# Patient Record
Sex: FEMALE | Race: WHITE | NOT HISPANIC OR LATINO | Employment: OTHER | URBAN - METROPOLITAN AREA
[De-identification: names, ages, dates, MRNs, and addresses within clinical notes are randomized per-mention and may not be internally consistent; named-entity substitution may affect disease eponyms.]

---

## 2017-01-03 ENCOUNTER — GENERIC CONVERSION - ENCOUNTER (OUTPATIENT)
Dept: OTHER | Facility: OTHER | Age: 67
End: 2017-01-03

## 2017-02-10 ENCOUNTER — GENERIC CONVERSION - ENCOUNTER (OUTPATIENT)
Dept: OTHER | Facility: OTHER | Age: 67
End: 2017-02-10

## 2017-02-16 ENCOUNTER — GENERIC CONVERSION - ENCOUNTER (OUTPATIENT)
Dept: OTHER | Facility: OTHER | Age: 67
End: 2017-02-16

## 2017-06-08 DIAGNOSIS — I48.0 PAROXYSMAL ATRIAL FIBRILLATION (HCC): ICD-10-CM

## 2017-06-08 DIAGNOSIS — I51.7 CARDIOMEGALY: ICD-10-CM

## 2017-06-08 DIAGNOSIS — M79.606 PAIN OF LOWER EXTREMITY: ICD-10-CM

## 2017-07-11 ENCOUNTER — APPOINTMENT (OUTPATIENT)
Dept: RADIOLOGY | Facility: CLINIC | Age: 67
End: 2017-07-11
Payer: MEDICARE

## 2017-07-11 ENCOUNTER — ALLSCRIPTS OFFICE VISIT (OUTPATIENT)
Dept: OTHER | Facility: OTHER | Age: 67
End: 2017-07-11

## 2017-07-11 ENCOUNTER — TRANSCRIBE ORDERS (OUTPATIENT)
Dept: RADIOLOGY | Facility: CLINIC | Age: 67
End: 2017-07-11

## 2017-07-11 DIAGNOSIS — R60.0 LOCALIZED EDEMA: ICD-10-CM

## 2017-07-11 DIAGNOSIS — M10.9 GOUT: ICD-10-CM

## 2017-07-11 DIAGNOSIS — Z11.59 ENCOUNTER FOR SCREENING FOR OTHER VIRAL DISEASES: ICD-10-CM

## 2017-07-11 DIAGNOSIS — Z00.00 ENCOUNTER FOR GENERAL ADULT MEDICAL EXAMINATION WITHOUT ABNORMAL FINDINGS: ICD-10-CM

## 2017-07-11 DIAGNOSIS — E78.5 HYPERLIPIDEMIA: ICD-10-CM

## 2017-07-11 DIAGNOSIS — R06.02 SHORTNESS OF BREATH: ICD-10-CM

## 2017-07-11 DIAGNOSIS — E03.9 HYPOTHYROIDISM: ICD-10-CM

## 2017-07-11 DIAGNOSIS — I48.0 PAROXYSMAL ATRIAL FIBRILLATION (HCC): ICD-10-CM

## 2017-07-11 PROCEDURE — 71020 HB CHEST X-RAY 2VW FRONTAL&LATL: CPT

## 2017-07-14 ENCOUNTER — GENERIC CONVERSION - ENCOUNTER (OUTPATIENT)
Dept: OTHER | Facility: OTHER | Age: 67
End: 2017-07-14

## 2017-07-28 ENCOUNTER — TRANSCRIBE ORDERS (OUTPATIENT)
Dept: ADMINISTRATIVE | Facility: HOSPITAL | Age: 67
End: 2017-07-28

## 2017-07-28 DIAGNOSIS — N17.0 ACUTE RENAL FAILURE WITH TUBULAR NECROSIS (HCC): Primary | ICD-10-CM

## 2017-08-15 ENCOUNTER — GENERIC CONVERSION - ENCOUNTER (OUTPATIENT)
Dept: OTHER | Facility: OTHER | Age: 67
End: 2017-08-15

## 2017-08-15 DIAGNOSIS — N20.0 CALCULUS OF KIDNEY: ICD-10-CM

## 2017-08-31 ENCOUNTER — HOSPITAL ENCOUNTER (OUTPATIENT)
Dept: CT IMAGING | Facility: HOSPITAL | Age: 67
Discharge: HOME | End: 2017-08-31
Attending: UROLOGY
Payer: MEDICARE

## 2017-08-31 DIAGNOSIS — N20.0 CALCULUS OF KIDNEY: ICD-10-CM

## 2017-08-31 PROCEDURE — 74176 CT ABD & PELVIS W/O CONTRAST: CPT

## 2017-09-01 ENCOUNTER — GENERIC CONVERSION - ENCOUNTER (OUTPATIENT)
Dept: OTHER | Facility: OTHER | Age: 67
End: 2017-09-01

## 2017-09-20 ENCOUNTER — ALLSCRIPTS OFFICE VISIT (OUTPATIENT)
Dept: OTHER | Facility: OTHER | Age: 67
End: 2017-09-20

## 2017-09-20 DIAGNOSIS — N83.202 CYST OF LEFT OVARY: ICD-10-CM

## 2017-09-20 DIAGNOSIS — R10.2 PELVIC AND PERINEAL PAIN: ICD-10-CM

## 2017-10-06 ENCOUNTER — HOSPITAL ENCOUNTER (OUTPATIENT)
Dept: ULTRASOUND IMAGING | Facility: HOSPITAL | Age: 67
Discharge: HOME | End: 2017-10-06
Attending: OBSTETRICS & GYNECOLOGY
Payer: MEDICARE

## 2017-10-06 DIAGNOSIS — R10.2 PELVIC AND PERINEAL PAIN: ICD-10-CM

## 2017-10-06 DIAGNOSIS — N83.202 CYST OF LEFT OVARY: ICD-10-CM

## 2017-10-06 PROCEDURE — 76856 US EXAM PELVIC COMPLETE: CPT

## 2017-10-06 PROCEDURE — 76830 TRANSVAGINAL US NON-OB: CPT

## 2017-10-13 ENCOUNTER — GENERIC CONVERSION - ENCOUNTER (OUTPATIENT)
Dept: OTHER | Facility: OTHER | Age: 67
End: 2017-10-13

## 2017-10-25 ENCOUNTER — TRANSCRIBE ORDERS (OUTPATIENT)
Dept: ADMINISTRATIVE | Facility: HOSPITAL | Age: 67
End: 2017-10-25

## 2017-10-25 DIAGNOSIS — R10.13 EPIGASTRIC PAIN: Primary | ICD-10-CM

## 2017-10-27 ENCOUNTER — HOSPITAL ENCOUNTER (OUTPATIENT)
Dept: CT IMAGING | Facility: HOSPITAL | Age: 67
Discharge: HOME | End: 2017-10-27
Payer: MEDICARE

## 2017-10-27 DIAGNOSIS — R10.13 EPIGASTRIC PAIN: ICD-10-CM

## 2017-10-27 PROCEDURE — 74176 CT ABD & PELVIS W/O CONTRAST: CPT

## 2017-11-09 ENCOUNTER — GENERIC CONVERSION - ENCOUNTER (OUTPATIENT)
Dept: OTHER | Facility: OTHER | Age: 67
End: 2017-11-09

## 2017-11-17 ENCOUNTER — HOSPITAL ENCOUNTER (OUTPATIENT)
Dept: ULTRASOUND IMAGING | Facility: MEDICAL CENTER | Age: 67
Discharge: HOME | End: 2017-11-17
Payer: MEDICARE

## 2017-11-17 ENCOUNTER — HOSPITAL ENCOUNTER (OUTPATIENT)
Dept: MAMMOGRAPHY | Facility: MEDICAL CENTER | Age: 67
Discharge: HOME | End: 2017-11-17
Payer: MEDICARE

## 2017-11-17 DIAGNOSIS — N83.202 CYST OF LEFT OVARY: ICD-10-CM

## 2017-11-17 DIAGNOSIS — Z12.31 ENCOUNTER FOR SCREENING MAMMOGRAM FOR MALIGNANT NEOPLASM OF BREAST: ICD-10-CM

## 2017-11-17 PROCEDURE — 76830 TRANSVAGINAL US NON-OB: CPT

## 2017-11-17 PROCEDURE — G0202 SCR MAMMO BI INCL CAD: HCPCS

## 2017-11-17 PROCEDURE — 76856 US EXAM PELVIC COMPLETE: CPT

## 2017-12-06 ENCOUNTER — GENERIC CONVERSION - ENCOUNTER (OUTPATIENT)
Dept: OTHER | Facility: OTHER | Age: 67
End: 2017-12-06

## 2018-01-05 ENCOUNTER — GENERIC CONVERSION - ENCOUNTER (OUTPATIENT)
Dept: OTHER | Facility: OTHER | Age: 68
End: 2018-01-05

## 2018-01-10 NOTE — RESULT NOTES
Verified Results  * XR CHEST PA & LATERAL [id number] 10:41AM Adali Borer Order Number: [id number]     Test Name Result Flag Reference   XR CHEST PA & LATERAL (Report)     CHEST 2 View     INDICATION: Edema  History of atrial fibrillation  Former smoker  COMPARISON: None     VIEWS: PA and lateral projections; 3 images     FINDINGS:        Mild cardiomegaly  The lungs are clear  No pneumothorax or pleural effusion  Visualized osseous structures appear within normal limits for the patient's age  IMPRESSION:     Mild cardiomegaly  No active pulmonary disease  Workstation performed: [id number]     Signed by:    Cherylene Maiers, MD   7/14/17

## 2018-01-14 VITALS
DIASTOLIC BLOOD PRESSURE: 78 MMHG | HEIGHT: 64 IN | SYSTOLIC BLOOD PRESSURE: 118 MMHG | RESPIRATION RATE: 18 BRPM | HEART RATE: 66 BPM | TEMPERATURE: 96.1 F

## 2018-01-14 VITALS
SYSTOLIC BLOOD PRESSURE: 140 MMHG | HEIGHT: 64 IN | DIASTOLIC BLOOD PRESSURE: 90 MMHG | WEIGHT: 293 LBS | BODY MASS INDEX: 50.02 KG/M2

## 2018-01-22 VITALS
DIASTOLIC BLOOD PRESSURE: 68 MMHG | HEIGHT: 64 IN | BODY MASS INDEX: 50.02 KG/M2 | WEIGHT: 293 LBS | SYSTOLIC BLOOD PRESSURE: 104 MMHG

## 2018-01-22 VITALS
WEIGHT: 293 LBS | HEIGHT: 64 IN | DIASTOLIC BLOOD PRESSURE: 67 MMHG | SYSTOLIC BLOOD PRESSURE: 132 MMHG | BODY MASS INDEX: 50.02 KG/M2

## 2018-01-23 NOTE — MISCELLANEOUS
Message   Recorded as Task   Date: 12/04/2017 04:29 PM, Created By: Basia Sotomayor   Task Name: Go to Result   Assigned To: Carlie Romeo   Regarding Patient: Maira Hill, Status: Active   CommentLu Goodwin - 04 Dec 2017 4:29 PM     TASK CREATED  I called the patient and reviewed her ultrasound results  I explained to the patient her ovary is not visible by u/s  I had the radiologist review both u/s and her original CT scan and another CT she had subsequently in the meantime  She has a stable simple cyst on the left ovary  the radiologist reports the ovary is too high in the pelvis to see it on vaginal u/s and her pannus obstructs the view abdominally  Pt advised that radiology recommends a repeat CT pelvis with contrast in 1 year to re-evaluate the cyst   Pt agreeable with plan of care  She requests that we send a copy of this task to [address] [place] [address],[address] home and to her house  Rx is in chart    PTN  Thanks!    Emy Bueno - 06 Dec 2017 7:47 AM     TASK REPLIED TO: Previously Assigned To Emy Bueno  Watch file        Signatures   Electronically signed by : YOANNA La ; Dec  6 2017 10:46AM EST                       (Author)

## 2018-01-26 ENCOUNTER — TELEPHONE (OUTPATIENT)
Dept: FAMILY MEDICINE CLINIC | Facility: CLINIC | Age: 68
End: 2018-01-26

## 2018-01-26 DIAGNOSIS — M19.90 OSTEOARTHRITIS, UNSPECIFIED OSTEOARTHRITIS TYPE, UNSPECIFIED SITE: Primary | ICD-10-CM

## 2018-01-26 DIAGNOSIS — Z79.01 CHRONIC ANTICOAGULATION: ICD-10-CM

## 2018-01-26 DIAGNOSIS — F32.A DEPRESSION, UNSPECIFIED DEPRESSION TYPE: ICD-10-CM

## 2018-01-26 RX ORDER — DULOXETIN HYDROCHLORIDE 20 MG/1
1 CAPSULE, DELAYED RELEASE ORAL DAILY
COMMUNITY
End: 2018-01-26 | Stop reason: SDUPTHER

## 2018-01-26 RX ORDER — DULOXETIN HYDROCHLORIDE 20 MG/1
20 CAPSULE, DELAYED RELEASE ORAL DAILY
Qty: 30 CAPSULE | Refills: 5 | Status: SHIPPED | OUTPATIENT
Start: 2018-01-26 | End: 2018-08-15 | Stop reason: SDUPTHER

## 2018-01-26 RX ORDER — DICLOFENAC SODIUM 75 MG/1
1 TABLET, DELAYED RELEASE ORAL 2 TIMES DAILY
COMMUNITY
Start: 2012-06-07 | End: 2018-01-26 | Stop reason: SDUPTHER

## 2018-01-26 RX ORDER — WARFARIN SODIUM 3 MG/1
3 TABLET ORAL DAILY
Qty: 30 TABLET | Refills: 0 | Status: SHIPPED | OUTPATIENT
Start: 2018-01-26 | End: 2018-04-26 | Stop reason: SDUPTHER

## 2018-01-26 RX ORDER — DICLOFENAC SODIUM 75 MG/1
75 TABLET, DELAYED RELEASE ORAL 2 TIMES DAILY
Qty: 60 TABLET | Refills: 3 | Status: SHIPPED | OUTPATIENT
Start: 2018-01-26 | End: 2018-06-05 | Stop reason: SDUPTHER

## 2018-01-26 RX ORDER — WARFARIN SODIUM 3 MG/1
1 TABLET ORAL DAILY
COMMUNITY
End: 2018-01-26 | Stop reason: SDUPTHER

## 2018-01-26 NOTE — TELEPHONE ENCOUNTER
Physical therapy TRACY cano 10/25/2018  She is unable to meet her goals    Has a lot of pain in hip and not able to walk,  Any questions [phone]

## 2018-01-31 ENCOUNTER — ANTICOAG VISIT (OUTPATIENT)
Dept: INTERNAL MEDICINE CLINIC | Facility: CLINIC | Age: 68
End: 2018-01-31

## 2018-01-31 ENCOUNTER — TELEPHONE (OUTPATIENT)
Dept: FAMILY MEDICINE CLINIC | Facility: CLINIC | Age: 68
End: 2018-01-31

## 2018-01-31 LAB — INR PPP: 1.8 (ref 0.86–1.16)

## 2018-01-31 RX ORDER — SENNOSIDES 8.6 MG
650 CAPSULE ORAL EVERY 8 HOURS PRN
COMMUNITY

## 2018-01-31 RX ORDER — ALLOPURINOL 300 MG/1
1 TABLET ORAL DAILY
COMMUNITY
Start: 2017-07-06 | End: 2018-07-18 | Stop reason: SDUPTHER

## 2018-01-31 RX ORDER — DULOXETIN HYDROCHLORIDE 30 MG/1
30 CAPSULE, DELAYED RELEASE ORAL
COMMUNITY
End: 2019-04-18 | Stop reason: SDUPTHER

## 2018-01-31 RX ORDER — ACETAMINOPHEN 325 MG/1
TABLET ORAL
COMMUNITY
End: 2020-10-15 | Stop reason: ALTCHOICE

## 2018-01-31 RX ORDER — OMEPRAZOLE 20 MG/1
1 CAPSULE, DELAYED RELEASE ORAL DAILY
Status: ON HOLD | COMMUNITY
Start: 2017-07-11 | End: 2020-10-03 | Stop reason: ALTCHOICE

## 2018-01-31 RX ORDER — FUROSEMIDE 40 MG/1
TABLET ORAL
COMMUNITY
End: 2018-07-18 | Stop reason: SDUPTHER

## 2018-01-31 RX ORDER — WARFARIN SODIUM 4 MG/1
TABLET ORAL
COMMUNITY
Start: 2017-07-24 | End: 2018-02-22 | Stop reason: SDUPTHER

## 2018-01-31 RX ORDER — DICLOFENAC SODIUM 75 MG/1
75 TABLET, DELAYED RELEASE ORAL
COMMUNITY
End: 2019-05-07

## 2018-01-31 RX ORDER — OXYCODONE HYDROCHLORIDE 5 MG/1
1 CAPSULE ORAL EVERY 6 HOURS PRN
COMMUNITY
Start: 2017-07-11 | End: 2020-10-15 | Stop reason: ALTCHOICE

## 2018-01-31 NOTE — TELEPHONE ENCOUNTER
Pt had PT/INR drawn last week 01/24/18 with home [facility]- never received a call- we never received the report- called the lab and they have the incorrect fax number- updated with them and requested a copy be faxed- verbal obtained also   PT-20 0  INR-1 8  She is currently on 3mg daily 2 5mg on Fridays

## 2018-02-09 ENCOUNTER — ANTICOAG VISIT (OUTPATIENT)
Dept: INTERNAL MEDICINE CLINIC | Facility: CLINIC | Age: 68
End: 2018-02-09

## 2018-02-09 ENCOUNTER — TELEPHONE (OUTPATIENT)
Dept: INTERNAL MEDICINE CLINIC | Facility: CLINIC | Age: 68
End: 2018-02-09

## 2018-02-09 LAB
INR PPP: 1.7 (ref 0.86–1.16)

## 2018-02-19 ENCOUNTER — ANTICOAG VISIT (OUTPATIENT)
Dept: INTERNAL MEDICINE CLINIC | Facility: CLINIC | Age: 68
End: 2018-02-19

## 2018-02-22 DIAGNOSIS — I48.91 ATRIAL FIBRILLATION, UNSPECIFIED TYPE (HCC): Primary | ICD-10-CM

## 2018-02-23 RX ORDER — WARFARIN SODIUM 4 MG/1
TABLET ORAL
Qty: 30 TABLET | Refills: 5 | Status: SHIPPED | OUTPATIENT
Start: 2018-02-23 | End: 2018-04-26 | Stop reason: SDUPTHER

## 2018-03-12 ENCOUNTER — TELEPHONE (OUTPATIENT)
Dept: FAMILY MEDICINE CLINIC | Facility: CLINIC | Age: 68
End: 2018-03-12

## 2018-03-19 LAB — INR PPP: 3.2 (ref 0.86–1.16)

## 2018-03-23 ENCOUNTER — ANTICOAG VISIT (OUTPATIENT)
Dept: INTERNAL MEDICINE CLINIC | Facility: CLINIC | Age: 68
End: 2018-03-23

## 2018-03-23 DIAGNOSIS — I48.91 ATRIAL FIBRILLATION, UNSPECIFIED TYPE (HCC): Primary | ICD-10-CM

## 2018-04-17 ENCOUNTER — TELEPHONE (OUTPATIENT)
Dept: INTERNAL MEDICINE CLINIC | Facility: CLINIC | Age: 68
End: 2018-04-17

## 2018-04-17 ENCOUNTER — ANTICOAG VISIT (OUTPATIENT)
Dept: INTERNAL MEDICINE CLINIC | Facility: CLINIC | Age: 68
End: 2018-04-17

## 2018-04-17 LAB — INR PPP: 2.3 (ref 0.86–1.16)

## 2018-04-17 NOTE — TELEPHONE ENCOUNTER
[facility] lab called on PT/INR results:    INR: 2 3   Pt:24 5    Collection date: 04/16/2018

## 2018-04-23 DIAGNOSIS — Z79.01 CHRONIC ANTICOAGULATION: ICD-10-CM

## 2018-04-23 RX ORDER — WARFARIN SODIUM 3 MG/1
3 TABLET ORAL DAILY
Qty: 30 TABLET | Refills: 5 | OUTPATIENT
Start: 2018-04-23

## 2018-04-24 ENCOUNTER — TELEPHONE (OUTPATIENT)
Dept: FAMILY MEDICINE CLINIC | Facility: CLINIC | Age: 68
End: 2018-04-24

## 2018-04-24 NOTE — TELEPHONE ENCOUNTER
Called pt to let her know she is overdue to a routine appt and she needs her coumadin 3 mg and states she has not come in for an appt due to not having any transportation- advised pt of the [facility] transport but has no money to [facility] for it  -suggested her care taker bring her and she said she is unable to get into her vehicle -

## 2018-04-25 ENCOUNTER — PATIENT OUTREACH (OUTPATIENT)
Dept: INTERNAL MEDICINE CLINIC | Facility: CLINIC | Age: 68
End: 2018-04-25

## 2018-04-26 DIAGNOSIS — Z79.01 CHRONIC ANTICOAGULATION: ICD-10-CM

## 2018-04-26 DIAGNOSIS — I48.91 ATRIAL FIBRILLATION, UNSPECIFIED TYPE (HCC): ICD-10-CM

## 2018-04-26 RX ORDER — WARFARIN SODIUM 3 MG/1
3 TABLET ORAL DAILY
Qty: 30 TABLET | Refills: 0 | Status: SHIPPED | OUTPATIENT
Start: 2018-04-26 | End: 2018-05-07 | Stop reason: SDUPTHER

## 2018-04-26 RX ORDER — WARFARIN SODIUM 4 MG/1
TABLET ORAL
Qty: 30 TABLET | Refills: 0 | Status: ON HOLD | OUTPATIENT
Start: 2018-04-26 | End: 2022-02-22 | Stop reason: SDUPTHER

## 2018-05-07 DIAGNOSIS — Z79.01 CHRONIC ANTICOAGULATION: ICD-10-CM

## 2018-05-07 RX ORDER — WARFARIN SODIUM 3 MG/1
3 TABLET ORAL DAILY
Qty: 30 TABLET | Refills: 0 | Status: SHIPPED | OUTPATIENT
Start: 2018-05-07 | End: 2018-05-30 | Stop reason: SDUPTHER

## 2018-05-07 NOTE — TELEPHONE ENCOUNTER
Spoke with pt and she is waiting for transportation with the bus to go through and will call to schedule appt  However, she will need rx to continue coumadin dose

## 2018-05-10 ENCOUNTER — PATIENT OUTREACH (OUTPATIENT)
Dept: INTERNAL MEDICINE CLINIC | Facility: CLINIC | Age: 68
End: 2018-05-10

## 2018-05-19 LAB
ABG-SITE (HISTORICAL): ABNORMAL
ALBUMIN SERPL BCP-MCNC: 3.7 G/DL (ref 3.5–5.7)
ALLEN TEST (HISTORICAL): POSITIVE
ALP SERPL-CCNC: 99 IU/L (ref 55–165)
ALT SERPL W P-5'-P-CCNC: 13 IU/L (ref 10–30)
ANION GAP SERPL CALCULATED.3IONS-SCNC: 12.5 MM/L
APTT PPP: 43.5 SEC (ref 24.4–37.6)
AST SERPL W P-5'-P-CCNC: 13 U/L (ref 7–26)
BASE EXCESS BLDA CALC-SCNC: -1.8 MM/L (ref -2–3)
BASOPHILS # BLD AUTO: 0 X3/UL (ref 0–0.3)
BASOPHILS # BLD AUTO: 0.4 % (ref 0–2)
BILIRUB SERPL-MCNC: 0.8 MG/DL (ref 0.3–1)
BNP SERPL-MCNC: 232 PG/ML (ref 1–100)
BUN SERPL-MCNC: 33 MG/DL (ref 7–25)
CALCIUM SERPL-MCNC: 9.4 MG/DL (ref 8.6–10.5)
CHLORIDE SERPL-SCNC: 102 MM/L (ref 98–107)
CO HEMOGLOBIN (HISTORICAL): 1 %
CO2 SERPL-SCNC: 28 MM/L (ref 21–31)
CREAT SERPL-MCNC: 1.03 MG/DL (ref 0.6–1.2)
DEOXYHEMOGLOBIN (HISTORICAL): 2 %
DEPRECATED RDW RBC AUTO: 17.2 % (ref 11.5–14.5)
EGFR (HISTORICAL): 54 GFR
EGFR AFRICAN AMERICAN (HISTORICAL): > 60 GFR
EOSINOPHIL # BLD AUTO: 0.2 X3/UL (ref 0–0.5)
EOSINOPHIL NFR BLD AUTO: 2.1 % (ref 0–5)
GLUCOSE (HISTORICAL): 102 MG/DL (ref 65–99)
HCO3 BLDA-SCNC: 22.4 MM/L (ref 22–26)
HCT VFR BLD AUTO: 38.5 % (ref 37–47)
HGB BLD-MCNC: 12.3 G/DL (ref 12–16)
INR PPP: 2.49 (ref 0.9–1.5)
LDL CHOLESTEROL DIRECT (HISTORICAL): 113.9 MG/DL
LYMPHOCYTES # BLD AUTO: 1 X3/UL (ref 1.2–4.2)
LYMPHOCYTES NFR BLD AUTO: 11 % (ref 20.5–51.1)
MCH RBC QN AUTO: 29.8 PG (ref 26–34)
MCHC RBC AUTO-ENTMCNC: 31.9 G/DL (ref 31–36)
MCV RBC AUTO: 93.3 FL (ref 81–99)
METHGB MFR BLDCO: 0.3 %
MONOCYTES # BLD AUTO: 0.5 X3/UL (ref 0–1)
MONOCYTES NFR BLD AUTO: 5.4 % (ref 1.7–12)
NEUTROPHILS # BLD AUTO: 7.4 X3/UL (ref 1.4–6.5)
NEUTS SEG NFR BLD AUTO: 81.1 % (ref 42.2–75.2)
NON VENT ROOM AIR (HISTORICAL): YES
O2 SAT (HISTORICAL): 97.9 %
OSMOLALITY, SERUM (HISTORICAL): 283 MOSM (ref 262–291)
OXY HEMOGLOBIN (HISTORICAL): 96.7 % (ref 94–100)
PCO2 BLDA: 38.3 MM HG (ref 35–45)
PH BLDA: 7.39 [PH] (ref 7.36–7.41)
PLATELET # BLD AUTO: 397 X3/UL (ref 130–400)
PMV BLD AUTO: 6.8 FL (ref 8.6–11.7)
PO2 BLDA: 91 MM HG
POTASSIUM SERPL-SCNC: 4.5 MM/L (ref 3.5–5.5)
PROTHROMBIN TIME (HISTORICAL): 29.4 SEC (ref 10.1–12.9)
RBC # BLD AUTO: 4.12 X6/UL (ref 3.9–5.2)
SODIUM SERPL-SCNC: 138 MM/L (ref 134–143)
TOTAL CO2 (HISTORICAL): 20.5 MM/L (ref 24–34)
TOTAL HGB (HISTORICAL): 11.2 G/DL (ref 12–16)
TOTAL PROTEIN (HISTORICAL): 7.5 G/DL (ref 6.4–8.9)
TROPONIN I SERPL-MCNC: < 0.03 NG/ML
TROPONIN I SERPL-MCNC: < 0.03 NG/ML
WBC # BLD AUTO: 9.1 X3/UL (ref 4.8–10.8)

## 2018-05-25 LAB
INR PPP: 2 (ref 0.86–1.17)
INR PPP: 2 (ref 0.86–1.17)

## 2018-05-29 ENCOUNTER — ANTICOAG VISIT (OUTPATIENT)
Dept: INTERNAL MEDICINE CLINIC | Facility: CLINIC | Age: 68
End: 2018-05-29

## 2018-05-30 DIAGNOSIS — Z79.01 CHRONIC ANTICOAGULATION: ICD-10-CM

## 2018-05-30 RX ORDER — WARFARIN SODIUM 3 MG/1
3 TABLET ORAL DAILY
Qty: 30 TABLET | Refills: 2 | Status: SHIPPED | OUTPATIENT
Start: 2018-05-30 | End: 2018-08-27 | Stop reason: SDUPTHER

## 2018-05-31 ENCOUNTER — PATIENT OUTREACH (OUTPATIENT)
Dept: INTERNAL MEDICINE CLINIC | Facility: CLINIC | Age: 68
End: 2018-05-31

## 2018-05-31 NOTE — PROGRESS NOTES
Outpatient Care Management Note:  Reached out to patient as a follow up  She was seen in [facility] with [facility] on 5/21/18  Per patient, they did nothing for her  She had to stay overnight due to transportation issues  Still has not made an appointment with her PCP, still waiting for her transportation vouchers  Encouraged to follow up with Carmen Chatman since it has been over a month since she requested them  Patient did not follow up with cardiology as previously discussed because "they would want to see me"  Her aides can provide transportation but per the patient, "one car is too high and one car is too low "  Waiver services are going to provide a handicapped accessible bathroom and stair glide system beginning next week per the patient  Reinforced the importance of making a PCP as well as Cardiology appointment ASAP

## 2018-06-05 DIAGNOSIS — M19.90 OSTEOARTHRITIS, UNSPECIFIED OSTEOARTHRITIS TYPE, UNSPECIFIED SITE: ICD-10-CM

## 2018-06-05 RX ORDER — DICLOFENAC SODIUM 75 MG/1
75 TABLET, DELAYED RELEASE ORAL 2 TIMES DAILY
Qty: 60 TABLET | Refills: 5 | Status: SHIPPED | OUTPATIENT
Start: 2018-06-05 | End: 2019-05-07 | Stop reason: SDUPTHER

## 2018-06-25 LAB — INR PPP: 2.3 (ref 0.86–1.17)

## 2018-06-27 ENCOUNTER — ANTICOAG VISIT (OUTPATIENT)
Dept: INTERNAL MEDICINE CLINIC | Facility: CLINIC | Age: 68
End: 2018-06-27

## 2018-06-28 ENCOUNTER — PATIENT OUTREACH (OUTPATIENT)
Dept: INTERNAL MEDICINE CLINIC | Facility: CLINIC | Age: 68
End: 2018-06-28

## 2018-06-28 NOTE — PROGRESS NOTES
Outpatient Care Management Notes:  follow up call completed  Received transportation vouchers but had to return her wheelchair to [facility] because the wheels were set back to far for her to use because of her rotator cuff issues  Is calling to order a wheelchair from [place] today because that is where [facility] gets theirs  No other issues  Not scheduling any appointments until she gets a wheelchair she can use  Encouraged her to reach out if she has continued issues

## 2018-06-29 ENCOUNTER — ANTICOAG VISIT (OUTPATIENT)
Dept: INTERNAL MEDICINE CLINIC | Facility: CLINIC | Age: 68
End: 2018-06-29

## 2018-07-18 DIAGNOSIS — M1A.9XX0 CHRONIC GOUT WITHOUT TOPHUS, UNSPECIFIED CAUSE, UNSPECIFIED SITE: Primary | ICD-10-CM

## 2018-07-18 DIAGNOSIS — I10 ESSENTIAL HYPERTENSION: Primary | ICD-10-CM

## 2018-07-18 RX ORDER — FUROSEMIDE 40 MG/1
40 TABLET ORAL DAILY
Qty: 30 TABLET | Refills: 5 | Status: SHIPPED | OUTPATIENT
Start: 2018-07-18 | End: 2019-07-30 | Stop reason: SDUPTHER

## 2018-07-18 RX ORDER — ALLOPURINOL 300 MG/1
TABLET ORAL
Qty: 30 TABLET | Refills: 0 | Status: SHIPPED | OUTPATIENT
Start: 2018-07-18 | End: 2018-08-15 | Stop reason: SDUPTHER

## 2018-07-20 ENCOUNTER — PATIENT OUTREACH (OUTPATIENT)
Dept: FAMILY MEDICINE CLINIC | Facility: CLINIC | Age: 68
End: 2018-07-20

## 2018-07-20 NOTE — PROGRESS NOTES
Outpatient Care Management Note:  follow up call completed  Received transportation vouchers but had to return her wheelchair to [facility] because the wheels were set back to far for her to use because of her rotator cuff issues  Has still not obtained the correct  wheelchair, it was on back order several places-ordered one from [address] [place] [address] which was supposed to arrive on 7/16/18 but has not  She has not looked into why it was delayed and per patient the company did not supply a tracking number  Offered to call the company but patient prefers to handle it herself  No other needs at this time

## 2018-07-31 ENCOUNTER — ANTICOAG VISIT (OUTPATIENT)
Dept: INTERNAL MEDICINE CLINIC | Facility: CLINIC | Age: 68
End: 2018-07-31

## 2018-07-31 LAB — INR PPP: 2.1 (ref 0.86–1.17)

## 2018-08-15 DIAGNOSIS — F32.A DEPRESSION, UNSPECIFIED DEPRESSION TYPE: ICD-10-CM

## 2018-08-15 DIAGNOSIS — M1A.9XX0 CHRONIC GOUT WITHOUT TOPHUS, UNSPECIFIED CAUSE, UNSPECIFIED SITE: ICD-10-CM

## 2018-08-15 RX ORDER — ALLOPURINOL 300 MG/1
300 TABLET ORAL DAILY
Qty: 30 TABLET | Refills: 5 | Status: SHIPPED | OUTPATIENT
Start: 2018-08-15 | End: 2019-04-09 | Stop reason: SDUPTHER

## 2018-08-15 RX ORDER — DULOXETIN HYDROCHLORIDE 20 MG/1
20 CAPSULE, DELAYED RELEASE ORAL DAILY
Qty: 30 CAPSULE | Refills: 5 | Status: SHIPPED | OUTPATIENT
Start: 2018-08-15

## 2018-08-27 DIAGNOSIS — Z79.01 CHRONIC ANTICOAGULATION: ICD-10-CM

## 2018-08-27 RX ORDER — WARFARIN SODIUM 3 MG/1
3 TABLET ORAL DAILY
Qty: 30 TABLET | Refills: 5 | Status: ON HOLD | OUTPATIENT
Start: 2018-08-27 | End: 2020-10-03 | Stop reason: SDUPTHER

## 2018-09-11 ENCOUNTER — ANTICOAG VISIT (OUTPATIENT)
Dept: INTERNAL MEDICINE CLINIC | Facility: CLINIC | Age: 68
End: 2018-09-11

## 2018-09-11 LAB — INR PPP: 2.3 (ref 0.86–1.17)

## 2018-11-15 DIAGNOSIS — N83.202 CYST OF LEFT OVARY: ICD-10-CM

## 2018-12-12 ENCOUNTER — TELEPHONE (OUTPATIENT)
Dept: UROLOGY | Facility: AMBULATORY SURGERY CENTER | Age: 68
End: 2018-12-12

## 2018-12-12 NOTE — TELEPHONE ENCOUNTER
Cassondra Severin from [facility] called concerning Judiths GFR  Elvira Meyer currently takes Allopurinol 300 mg X 1 dly  However yesterday she had labs done and her GFR is 34  So the MD would like to decrease the Allopurinol to 100mg x dly  Pt will not agree to this unless Dr Lázaro Bush states that it is ok with him   Please Advise [phone]

## 2018-12-13 NOTE — TELEPHONE ENCOUNTER
[address] [place] [address] to decrease Allopurinol to 100mg daily per Dr Zoë Schwartz

## 2018-12-27 ENCOUNTER — ANTICOAG VISIT (OUTPATIENT)
Dept: INTERNAL MEDICINE CLINIC | Facility: CLINIC | Age: 68
End: 2018-12-27

## 2019-02-18 ENCOUNTER — TELEPHONE (OUTPATIENT)
Dept: INTERNAL MEDICINE CLINIC | Facility: CLINIC | Age: 69
End: 2019-02-18

## 2019-02-18 ENCOUNTER — ANTICOAG VISIT (OUTPATIENT)
Dept: INTERNAL MEDICINE CLINIC | Facility: CLINIC | Age: 69
End: 2019-02-18

## 2019-02-18 NOTE — TELEPHONE ENCOUNTER
Pt is currently at [facility], they have their own provider Dr Norberto Vegas who is monitoring her PT INR till she is discharged  There is no discharge date, the facility will fax over all documents to our office when she is discharged

## 2019-03-25 ENCOUNTER — TRANSITIONAL CARE MANAGEMENT (OUTPATIENT)
Dept: INTERNAL MEDICINE CLINIC | Facility: CLINIC | Age: 69
End: 2019-03-25

## 2019-03-25 ENCOUNTER — TELEPHONE (OUTPATIENT)
Dept: INTERNAL MEDICINE CLINIC | Facility: CLINIC | Age: 69
End: 2019-03-25

## 2019-03-26 ENCOUNTER — TELEPHONE (OUTPATIENT)
Dept: INTERNAL MEDICINE CLINIC | Facility: CLINIC | Age: 69
End: 2019-03-26

## 2019-03-26 NOTE — TELEPHONE ENCOUNTER
Pt called, was discharged yesterday from [facility], broke hip last September, pt can't come in for appt yet, can't get around , states she is due for her INR next week, will need [facility] to do home draws

## 2019-03-27 ENCOUNTER — ANTICOAG VISIT (OUTPATIENT)
Dept: INTERNAL MEDICINE CLINIC | Facility: CLINIC | Age: 69
End: 2019-03-27

## 2019-03-27 DIAGNOSIS — I48.91 ATRIAL FIBRILLATION, UNSPECIFIED TYPE (HCC): Primary | ICD-10-CM

## 2019-04-09 DIAGNOSIS — M1A.9XX0 CHRONIC GOUT WITHOUT TOPHUS, UNSPECIFIED CAUSE, UNSPECIFIED SITE: ICD-10-CM

## 2019-04-09 DIAGNOSIS — E03.9 ACQUIRED HYPOTHYROIDISM: ICD-10-CM

## 2019-04-09 DIAGNOSIS — Z79.01 CHRONIC ANTICOAGULATION: Primary | ICD-10-CM

## 2019-04-09 RX ORDER — LEVOTHYROXINE SODIUM 88 UG/1
88 TABLET ORAL DAILY
Qty: 30 TABLET | Refills: 5 | Status: SHIPPED | OUTPATIENT
Start: 2019-04-09 | End: 2019-09-30 | Stop reason: SDUPTHER

## 2019-04-09 RX ORDER — WARFARIN SODIUM 2.5 MG/1
TABLET ORAL
COMMUNITY
End: 2019-04-09 | Stop reason: SDUPTHER

## 2019-04-09 RX ORDER — LEVOTHYROXINE SODIUM 88 UG/1
88 TABLET ORAL DAILY
COMMUNITY
End: 2019-04-09 | Stop reason: SDUPTHER

## 2019-04-09 RX ORDER — ALLOPURINOL 300 MG/1
300 TABLET ORAL DAILY
Qty: 30 TABLET | Refills: 5 | Status: SHIPPED | OUTPATIENT
Start: 2019-04-09 | End: 2019-04-11 | Stop reason: SDUPTHER

## 2019-04-09 RX ORDER — WARFARIN SODIUM 2.5 MG/1
2.5 TABLET ORAL
Qty: 30 TABLET | Refills: 5 | Status: SHIPPED | OUTPATIENT
Start: 2019-04-09 | End: 2020-01-02 | Stop reason: SDUPTHER

## 2019-04-11 DIAGNOSIS — M1A.9XX0 CHRONIC GOUT WITHOUT TOPHUS, UNSPECIFIED CAUSE, UNSPECIFIED SITE: ICD-10-CM

## 2019-04-11 RX ORDER — ALLOPURINOL 100 MG/1
100 TABLET ORAL DAILY
Qty: 30 TABLET | Refills: 5 | Status: SHIPPED | OUTPATIENT
Start: 2019-04-11 | End: 2019-09-30 | Stop reason: SDUPTHER

## 2019-04-16 ENCOUNTER — TELEPHONE (OUTPATIENT)
Dept: INTERNAL MEDICINE CLINIC | Facility: CLINIC | Age: 69
End: 2019-04-16

## 2019-04-18 DIAGNOSIS — F32.A DEPRESSION, UNSPECIFIED DEPRESSION TYPE: ICD-10-CM

## 2019-04-18 RX ORDER — DULOXETIN HYDROCHLORIDE 30 MG/1
30 CAPSULE, DELAYED RELEASE ORAL DAILY
Qty: 30 CAPSULE | Refills: 5 | Status: SHIPPED | OUTPATIENT
Start: 2019-04-18 | End: 2019-10-07 | Stop reason: SDUPTHER

## 2019-05-07 DIAGNOSIS — M19.90 OSTEOARTHRITIS, UNSPECIFIED OSTEOARTHRITIS TYPE, UNSPECIFIED SITE: ICD-10-CM

## 2019-05-07 RX ORDER — DICLOFENAC SODIUM 75 MG/1
75 TABLET, DELAYED RELEASE ORAL 2 TIMES DAILY
Qty: 60 TABLET | Refills: 5 | Status: SHIPPED | OUTPATIENT
Start: 2019-05-07 | End: 2019-11-04 | Stop reason: SDUPTHER

## 2019-05-30 ENCOUNTER — TELEPHONE (OUTPATIENT)
Dept: INTERNAL MEDICINE CLINIC | Facility: CLINIC | Age: 69
End: 2019-05-30

## 2019-05-31 DIAGNOSIS — M16.11 OSTEOARTHRITIS OF RIGHT HIP, UNSPECIFIED OSTEOARTHRITIS TYPE: ICD-10-CM

## 2019-05-31 DIAGNOSIS — M17.12 OSTEOARTHRITIS OF LEFT KNEE, UNSPECIFIED OSTEOARTHRITIS TYPE: ICD-10-CM

## 2019-05-31 DIAGNOSIS — M19.90 OSTEOARTHRITIS, UNSPECIFIED OSTEOARTHRITIS TYPE, UNSPECIFIED SITE: Primary | ICD-10-CM

## 2019-05-31 DIAGNOSIS — I89.0 LYMPHEDEMA: ICD-10-CM

## 2019-05-31 DIAGNOSIS — M19.011 OSTEOARTHRITIS OF RIGHT SHOULDER, UNSPECIFIED OSTEOARTHRITIS TYPE: ICD-10-CM

## 2019-05-31 DIAGNOSIS — I80.02: ICD-10-CM

## 2019-05-31 DIAGNOSIS — R26.2 AMBULATORY DYSFUNCTION: ICD-10-CM

## 2019-05-31 DIAGNOSIS — I48.91 ATRIAL FIBRILLATION, UNSPECIFIED TYPE (HCC): Primary | ICD-10-CM

## 2019-06-04 ENCOUNTER — TELEPHONE (OUTPATIENT)
Dept: INTERNAL MEDICINE CLINIC | Facility: CLINIC | Age: 69
End: 2019-06-04

## 2019-06-04 LAB — INR PPP: 1.4 (ref 0.86–1.17)

## 2019-06-05 ENCOUNTER — ANTICOAG VISIT (OUTPATIENT)
Dept: INTERNAL MEDICINE CLINIC | Facility: CLINIC | Age: 69
End: 2019-06-05

## 2019-06-18 DIAGNOSIS — I48.0 PAF (PAROXYSMAL ATRIAL FIBRILLATION) (HCC): Primary | ICD-10-CM

## 2019-06-19 LAB — INR PPP: 1.4 (ref 0.86–1.17)

## 2019-06-20 ENCOUNTER — TELEPHONE (OUTPATIENT)
Dept: INTERNAL MEDICINE CLINIC | Facility: CLINIC | Age: 69
End: 2019-06-20

## 2019-06-20 ENCOUNTER — ANTICOAG VISIT (OUTPATIENT)
Dept: FAMILY MEDICINE CLINIC | Facility: CLINIC | Age: 69
End: 2019-06-20

## 2019-06-20 DIAGNOSIS — Z79.01 CHRONIC ANTICOAGULATION: Primary | ICD-10-CM

## 2019-07-18 ENCOUNTER — TELEPHONE (OUTPATIENT)
Dept: INTERNAL MEDICINE CLINIC | Facility: CLINIC | Age: 69
End: 2019-07-18

## 2019-07-18 ENCOUNTER — ANTICOAG VISIT (OUTPATIENT)
Dept: INTERNAL MEDICINE CLINIC | Facility: CLINIC | Age: 69
End: 2019-07-18

## 2019-07-18 LAB — INR PPP: 2.4 (ref 0.84–1.19)

## 2019-07-19 ENCOUNTER — TELEPHONE (OUTPATIENT)
Dept: INTERNAL MEDICINE CLINIC | Facility: CLINIC | Age: 69
End: 2019-07-19

## 2019-07-19 NOTE — TELEPHONE ENCOUNTER
Patient is bed bound and tin reid comes into her house to treat her  I will remove dr Marcia Vasquez as her PCP  Pt is aware of this

## 2019-07-30 DIAGNOSIS — I10 ESSENTIAL HYPERTENSION: ICD-10-CM

## 2019-07-30 RX ORDER — FUROSEMIDE 40 MG/1
TABLET ORAL
Qty: 30 TABLET | Refills: 0 | Status: SHIPPED | OUTPATIENT
Start: 2019-07-30 | End: 2019-08-23 | Stop reason: SDUPTHER

## 2019-08-15 LAB — INR PPP: 2.3 (ref 0.84–1.19)

## 2019-08-16 ENCOUNTER — ANTICOAG VISIT (OUTPATIENT)
Dept: INTERNAL MEDICINE CLINIC | Facility: CLINIC | Age: 69
End: 2019-08-16

## 2019-08-23 DIAGNOSIS — I10 ESSENTIAL HYPERTENSION: ICD-10-CM

## 2019-08-23 RX ORDER — FUROSEMIDE 40 MG/1
40 TABLET ORAL DAILY
Qty: 30 TABLET | Refills: 5 | Status: SHIPPED | OUTPATIENT
Start: 2019-08-23 | End: 2022-06-14

## 2019-09-12 ENCOUNTER — TELEPHONE (OUTPATIENT)
Dept: INTERNAL MEDICINE CLINIC | Facility: CLINIC | Age: 69
End: 2019-09-12

## 2019-09-13 LAB — INR PPP: 2.8 (ref 0.84–1.19)

## 2019-09-16 ENCOUNTER — ANTICOAG VISIT (OUTPATIENT)
Dept: INTERNAL MEDICINE CLINIC | Facility: CLINIC | Age: 69
End: 2019-09-16

## 2019-09-30 DIAGNOSIS — E03.9 ACQUIRED HYPOTHYROIDISM: ICD-10-CM

## 2019-09-30 DIAGNOSIS — M1A.9XX0 CHRONIC GOUT WITHOUT TOPHUS, UNSPECIFIED CAUSE, UNSPECIFIED SITE: ICD-10-CM

## 2019-09-30 RX ORDER — LEVOTHYROXINE SODIUM 88 UG/1
88 TABLET ORAL DAILY
Qty: 30 TABLET | Refills: 5 | Status: ON HOLD | OUTPATIENT
Start: 2019-09-30 | End: 2021-11-18 | Stop reason: CLARIF

## 2019-09-30 RX ORDER — ALLOPURINOL 100 MG/1
100 TABLET ORAL DAILY
Qty: 30 TABLET | Refills: 5 | Status: SHIPPED | OUTPATIENT
Start: 2019-09-30

## 2019-09-30 RX ORDER — ALLOPURINOL 100 MG/1
TABLET ORAL
Qty: 30 TABLET | Refills: 0 | Status: ON HOLD | OUTPATIENT
Start: 2019-09-30 | End: 2020-10-03 | Stop reason: SDUPTHER

## 2019-09-30 RX ORDER — LEVOTHYROXINE SODIUM 88 UG/1
TABLET ORAL
Qty: 30 TABLET | Refills: 0 | Status: ON HOLD | OUTPATIENT
Start: 2019-09-30 | End: 2020-10-03 | Stop reason: SDUPTHER

## 2019-10-07 DIAGNOSIS — F32.A DEPRESSION, UNSPECIFIED DEPRESSION TYPE: ICD-10-CM

## 2019-10-07 RX ORDER — DULOXETIN HYDROCHLORIDE 30 MG/1
CAPSULE, DELAYED RELEASE ORAL
Qty: 30 CAPSULE | Refills: 0 | Status: SHIPPED | OUTPATIENT
Start: 2019-10-07 | End: 2019-11-04 | Stop reason: SDUPTHER

## 2019-10-10 ENCOUNTER — TELEPHONE (OUTPATIENT)
Dept: INTERNAL MEDICINE CLINIC | Facility: CLINIC | Age: 69
End: 2019-10-10

## 2019-10-11 ENCOUNTER — ANTICOAG VISIT (OUTPATIENT)
Dept: INTERNAL MEDICINE CLINIC | Facility: CLINIC | Age: 69
End: 2019-10-11

## 2019-10-11 LAB — INR PPP: 2.6 (ref 0.84–1.19)

## 2019-11-04 DIAGNOSIS — M19.90 OSTEOARTHRITIS, UNSPECIFIED OSTEOARTHRITIS TYPE, UNSPECIFIED SITE: ICD-10-CM

## 2019-11-04 DIAGNOSIS — F32.A DEPRESSION, UNSPECIFIED DEPRESSION TYPE: ICD-10-CM

## 2019-11-04 RX ORDER — DICLOFENAC SODIUM 75 MG/1
75 TABLET, DELAYED RELEASE ORAL 2 TIMES DAILY
Qty: 60 TABLET | Refills: 5 | Status: ON HOLD | OUTPATIENT
Start: 2019-11-04 | End: 2020-10-03 | Stop reason: ALTCHOICE

## 2019-11-04 RX ORDER — DULOXETIN HYDROCHLORIDE 30 MG/1
CAPSULE, DELAYED RELEASE ORAL
Qty: 30 CAPSULE | Refills: 3 | Status: ON HOLD | OUTPATIENT
Start: 2019-11-04 | End: 2020-10-03 | Stop reason: ALTCHOICE

## 2019-11-08 ENCOUNTER — ANTICOAG VISIT (OUTPATIENT)
Dept: INTERNAL MEDICINE CLINIC | Facility: CLINIC | Age: 69
End: 2019-11-08

## 2020-01-02 DIAGNOSIS — Z79.01 CHRONIC ANTICOAGULATION: ICD-10-CM

## 2020-01-02 RX ORDER — WARFARIN SODIUM 2.5 MG/1
2.5 TABLET ORAL
Qty: 30 TABLET | Refills: 5 | Status: ON HOLD | OUTPATIENT
Start: 2020-01-02 | End: 2022-02-22 | Stop reason: SDUPTHER

## 2020-01-06 ENCOUNTER — TELEPHONE (OUTPATIENT)
Dept: INTERNAL MEDICINE CLINIC | Facility: CLINIC | Age: 70
End: 2020-01-06

## 2020-01-06 NOTE — TELEPHONE ENCOUNTER
Pt has not been seen in two years, she is showing up on your pursuit list  She has a nurse practitioner that comes to her home and treats her for all  basic medical needs, She has you listed as her PCP because you control INR, She has no transportation that can her to her appointments  She is completely home bound  What would you like me to do

## 2020-01-07 ENCOUNTER — ANTICOAG VISIT (OUTPATIENT)
Dept: INTERNAL MEDICINE CLINIC | Facility: CLINIC | Age: 70
End: 2020-01-07

## 2020-01-10 NOTE — TELEPHONE ENCOUNTER
Left detailed message on patients answering machine  Instructed her that if she wants to continue with Dr Hallie Wylie she had to make an appointment  She has a NP taking care of her and instructed her that she can take care of her medications and her care    DW

## 2020-09-17 ENCOUNTER — TELEPHONE (OUTPATIENT)
Dept: UROLOGY | Facility: MEDICAL CENTER | Age: 70
End: 2020-09-17

## 2020-09-17 NOTE — TELEPHONE ENCOUNTER
Luz Smith called back and patient is bed bound and cannot come into the office  They are asking if the doctor can do a virtual appointment ex face time  Please call Luz Smith back at [phone]

## 2020-09-17 NOTE — TELEPHONE ENCOUNTER
[facility] back and left msg on voicemail that if pt is in pain she should go to ER  She hasn't been seen here in over 3 years and would be considered a new pt  Dr Dominick Levine first available appt is in middle of November  Left number to call back to discuss further

## 2020-09-17 NOTE — TELEPHONE ENCOUNTER
LMOM for Юлия Patterson  Pt has not been seen in over 3 yrs  We are trying to obtain any records in [facility]  Pt would need to make an appt for evaluation as a new pt  Please call back to schedule appt

## 2020-09-17 NOTE — TELEPHONE ENCOUNTER
Pt managed by Chanda Da Silva [facility] in regards to pt having rt flank pain states she has history of stones pt is bed bound

## 2020-09-28 ENCOUNTER — HOSPITAL ENCOUNTER (EMERGENCY)
Facility: HOSPITAL | Age: 70
Discharge: HOME | End: 2020-09-28
Attending: EMERGENCY MEDICINE | Admitting: EMERGENCY MEDICINE
Payer: MEDICARE

## 2020-09-28 VITALS
HEART RATE: 73 BPM | SYSTOLIC BLOOD PRESSURE: 124 MMHG | WEIGHT: 293 LBS | OXYGEN SATURATION: 96 % | TEMPERATURE: 98.4 F | BODY MASS INDEX: 72.44 KG/M2 | DIASTOLIC BLOOD PRESSURE: 73 MMHG | RESPIRATION RATE: 20 BRPM

## 2020-09-28 DIAGNOSIS — L03.311 CELLULITIS OF RIGHT ABDOMINAL WALL: Primary | ICD-10-CM

## 2020-09-28 DIAGNOSIS — E66.01 SUPER-SUPER OBESE (HCC): ICD-10-CM

## 2020-09-28 LAB
ALBUMIN SERPL BCP-MCNC: 2.6 G/DL (ref 3.5–5)
ALP SERPL-CCNC: 125 U/L (ref 46–116)
ALT SERPL W P-5'-P-CCNC: 17 U/L (ref 12–78)
ANION GAP SERPL CALCULATED.3IONS-SCNC: 3 MMOL/L (ref 4–13)
AST SERPL W P-5'-P-CCNC: 24 U/L (ref 5–45)
BASOPHILS # BLD AUTO: 0.05 THOUSANDS/ΜL (ref 0–0.1)
BASOPHILS NFR BLD AUTO: 1 % (ref 0–1)
BILIRUB SERPL-MCNC: 0.97 MG/DL (ref 0.2–1)
BUN SERPL-MCNC: 23 MG/DL (ref 5–25)
CALCIUM ALBUM COR SERPL-MCNC: 9.8 MG/DL (ref 8.3–10.1)
CALCIUM SERPL-MCNC: 8.7 MG/DL (ref 8.3–10.1)
CHLORIDE SERPL-SCNC: 105 MMOL/L (ref 100–108)
CO2 SERPL-SCNC: 29 MMOL/L (ref 21–32)
CREAT SERPL-MCNC: 1.11 MG/DL (ref 0.6–1.3)
EOSINOPHIL # BLD AUTO: 0.25 THOUSAND/ΜL (ref 0–0.61)
EOSINOPHIL NFR BLD AUTO: 3 % (ref 0–6)
ERYTHROCYTE [DISTWIDTH] IN BLOOD BY AUTOMATED COUNT: 17 % (ref 11.6–15.1)
GFR SERPL CREATININE-BSD FRML MDRD: 51 ML/MIN/1.73SQ M
GLUCOSE SERPL-MCNC: 99 MG/DL (ref 65–140)
HCT VFR BLD AUTO: 40.9 % (ref 34.8–46.1)
HGB BLD-MCNC: 12.4 G/DL (ref 11.5–15.4)
IMM GRANULOCYTES # BLD AUTO: 0.03 THOUSAND/UL (ref 0–0.2)
IMM GRANULOCYTES NFR BLD AUTO: 0 % (ref 0–2)
LYMPHOCYTES # BLD AUTO: 1.31 THOUSANDS/ΜL (ref 0.6–4.47)
LYMPHOCYTES NFR BLD AUTO: 14 % (ref 14–44)
MCH RBC QN AUTO: 25.8 PG (ref 26.8–34.3)
MCHC RBC AUTO-ENTMCNC: 30.3 G/DL (ref 31.4–37.4)
MCV RBC AUTO: 85 FL (ref 82–98)
MONOCYTES # BLD AUTO: 0.47 THOUSAND/ΜL (ref 0.17–1.22)
MONOCYTES NFR BLD AUTO: 5 % (ref 4–12)
NEUTROPHILS # BLD AUTO: 6.99 THOUSANDS/ΜL (ref 1.85–7.62)
NEUTS SEG NFR BLD AUTO: 77 % (ref 43–75)
NRBC BLD AUTO-RTO: 0 /100 WBCS
PLATELET # BLD AUTO: 396 THOUSANDS/UL (ref 149–390)
PMV BLD AUTO: 9.1 FL (ref 8.9–12.7)
POTASSIUM SERPL-SCNC: 4.1 MMOL/L (ref 3.5–5.3)
PROT SERPL-MCNC: 8 G/DL (ref 6.4–8.2)
RBC # BLD AUTO: 4.8 MILLION/UL (ref 3.81–5.12)
SODIUM SERPL-SCNC: 137 MMOL/L (ref 136–145)
WBC # BLD AUTO: 9.1 THOUSAND/UL (ref 4.31–10.16)

## 2020-09-28 PROCEDURE — 99283 EMERGENCY DEPT VISIT LOW MDM: CPT

## 2020-09-28 PROCEDURE — 80053 COMPREHEN METABOLIC PANEL: CPT | Performed by: EMERGENCY MEDICINE

## 2020-09-28 PROCEDURE — 85025 COMPLETE CBC W/AUTO DIFF WBC: CPT | Performed by: EMERGENCY MEDICINE

## 2020-09-28 PROCEDURE — 36415 COLL VENOUS BLD VENIPUNCTURE: CPT | Performed by: EMERGENCY MEDICINE

## 2020-09-28 PROCEDURE — 99284 EMERGENCY DEPT VISIT MOD MDM: CPT | Performed by: EMERGENCY MEDICINE

## 2020-09-28 RX ORDER — CEPHALEXIN 500 MG/1
1000 CAPSULE ORAL EVERY 6 HOURS SCHEDULED
Qty: 56 CAPSULE | Refills: 0 | Status: SHIPPED | OUTPATIENT
Start: 2020-09-28 | End: 2020-10-06 | Stop reason: HOSPADM

## 2020-09-28 RX ORDER — CEPHALEXIN 250 MG/1
1000 CAPSULE ORAL ONCE
Status: COMPLETED | OUTPATIENT
Start: 2020-09-28 | End: 2020-09-28

## 2020-09-28 RX ADMIN — CEPHALEXIN 1000 MG: 250 CAPSULE ORAL at 14:20

## 2020-10-03 ENCOUNTER — HOSPITAL ENCOUNTER (INPATIENT)
Facility: HOSPITAL | Age: 70
LOS: 2 days | Discharge: HOME HEALTH | DRG: 603 | End: 2020-10-06
Attending: EMERGENCY MEDICINE | Admitting: HOSPITALIST
Payer: MEDICARE

## 2020-10-03 ENCOUNTER — APPOINTMENT (EMERGENCY)
Dept: CT IMAGING | Facility: HOSPITAL | Age: 70
DRG: 603 | End: 2020-10-03
Payer: MEDICARE

## 2020-10-03 DIAGNOSIS — R23.4 INDURATION OF SKIN: ICD-10-CM

## 2020-10-03 DIAGNOSIS — I50.32 CHRONIC DIASTOLIC CONGESTIVE HEART FAILURE (HCC): ICD-10-CM

## 2020-10-03 DIAGNOSIS — L03.90 CELLULITIS, UNSPECIFIED CELLULITIS SITE: Primary | ICD-10-CM

## 2020-10-03 DIAGNOSIS — R10.31 RLQ ABDOMINAL PAIN: ICD-10-CM

## 2020-10-03 PROBLEM — F33.0 MILD EPISODE OF RECURRENT MAJOR DEPRESSIVE DISORDER (HCC): Status: ACTIVE | Noted: 2020-10-03

## 2020-10-03 PROBLEM — M15.9 PRIMARY OSTEOARTHRITIS INVOLVING MULTIPLE JOINTS: Status: ACTIVE | Noted: 2020-10-03

## 2020-10-03 PROBLEM — L03.311 CELLULITIS OF ABDOMINAL WALL: Status: ACTIVE | Noted: 2020-10-03

## 2020-10-03 PROBLEM — K21.9 GASTROESOPHAGEAL REFLUX DISEASE WITHOUT ESOPHAGITIS: Status: ACTIVE | Noted: 2020-10-03

## 2020-10-03 PROBLEM — M15.0 PRIMARY OSTEOARTHRITIS INVOLVING MULTIPLE JOINTS: Status: ACTIVE | Noted: 2020-10-03

## 2020-10-03 PROBLEM — E03.8 OTHER SPECIFIED HYPOTHYROIDISM: Status: ACTIVE | Noted: 2020-10-03

## 2020-10-03 PROBLEM — E66.01 MORBID OBESITY WITH BMI OF 70 AND OVER, ADULT (HCC): Status: ACTIVE | Noted: 2020-10-03

## 2020-10-03 PROBLEM — M1A.09X0 IDIOPATHIC CHRONIC GOUT OF MULTIPLE SITES WITHOUT TOPHUS: Status: ACTIVE | Noted: 2020-10-03

## 2020-10-03 PROBLEM — I48.0 PAROXYSMAL ATRIAL FIBRILLATION (HCC): Status: ACTIVE | Noted: 2020-10-03

## 2020-10-03 LAB
ALBUMIN SERPL BCP-MCNC: 3.3 G/DL (ref 3.5–5.7)
ALP SERPL-CCNC: 94 U/L (ref 55–165)
ALT SERPL W P-5'-P-CCNC: 13 U/L (ref 7–52)
ANION GAP SERPL CALCULATED.3IONS-SCNC: 7 MMOL/L (ref 4–13)
APTT PPP: 37 SECONDS (ref 23–37)
AST SERPL W P-5'-P-CCNC: 16 U/L (ref 13–39)
BASOPHILS # BLD AUTO: 0.1 THOUSANDS/ΜL (ref 0–0.1)
BASOPHILS NFR BLD AUTO: 1 % (ref 0–2)
BILIRUB SERPL-MCNC: 0.9 MG/DL (ref 0.2–1)
BUN SERPL-MCNC: 22 MG/DL (ref 7–25)
CALCIUM ALBUM COR SERPL-MCNC: 9.7 MG/DL (ref 8.3–10.1)
CALCIUM SERPL-MCNC: 9.1 MG/DL (ref 8.6–10.5)
CHLORIDE SERPL-SCNC: 99 MMOL/L (ref 98–107)
CO2 SERPL-SCNC: 33 MMOL/L (ref 21–31)
CREAT SERPL-MCNC: 0.97 MG/DL (ref 0.6–1.2)
CRP SERPL QL: 29 MG/L
EOSINOPHIL # BLD AUTO: 0.2 THOUSAND/ΜL (ref 0–0.61)
EOSINOPHIL NFR BLD AUTO: 2 % (ref 0–5)
ERYTHROCYTE [DISTWIDTH] IN BLOOD BY AUTOMATED COUNT: 17.7 % (ref 11.5–14.5)
ERYTHROCYTE [SEDIMENTATION RATE] IN BLOOD: 110 MM/HOUR (ref 0–29)
GFR SERPL CREATININE-BSD FRML MDRD: 60 ML/MIN/1.73SQ M
GLUCOSE SERPL-MCNC: 113 MG/DL (ref 65–99)
HCT VFR BLD AUTO: 40.2 % (ref 42–47)
HGB BLD-MCNC: 12.5 G/DL (ref 12–16)
INR PPP: 1.85 (ref 0.84–1.19)
LACTATE SERPL-SCNC: 1.4 MMOL/L (ref 0.5–2)
LYMPHOCYTES # BLD AUTO: 1.1 THOUSANDS/ΜL (ref 0.6–4.47)
LYMPHOCYTES NFR BLD AUTO: 12 % (ref 21–51)
MCH RBC QN AUTO: 25.3 PG (ref 26–34)
MCHC RBC AUTO-ENTMCNC: 31.1 G/DL (ref 31–37)
MCV RBC AUTO: 82 FL (ref 81–99)
MONOCYTES # BLD AUTO: 0.5 THOUSAND/ΜL (ref 0.17–1.22)
MONOCYTES NFR BLD AUTO: 5 % (ref 2–12)
NEUTROPHILS # BLD AUTO: 7.7 THOUSANDS/ΜL (ref 1.4–6.5)
NEUTS SEG NFR BLD AUTO: 81 % (ref 42–75)
PLATELET # BLD AUTO: 437 THOUSANDS/UL (ref 149–390)
PMV BLD AUTO: 6.9 FL (ref 8.6–11.7)
POTASSIUM SERPL-SCNC: 3.9 MMOL/L (ref 3.5–5.5)
PROT SERPL-MCNC: 7.7 G/DL (ref 6.4–8.9)
PROTHROMBIN TIME: 21.1 SECONDS (ref 11.6–14.5)
RBC # BLD AUTO: 4.93 MILLION/UL (ref 3.9–5.2)
SODIUM SERPL-SCNC: 139 MMOL/L (ref 134–143)
WBC # BLD AUTO: 9.6 THOUSAND/UL (ref 4.8–10.8)

## 2020-10-03 PROCEDURE — G1004 CDSM NDSC: HCPCS

## 2020-10-03 PROCEDURE — 85652 RBC SED RATE AUTOMATED: CPT | Performed by: EMERGENCY MEDICINE

## 2020-10-03 PROCEDURE — 85730 THROMBOPLASTIN TIME PARTIAL: CPT | Performed by: EMERGENCY MEDICINE

## 2020-10-03 PROCEDURE — 85610 PROTHROMBIN TIME: CPT | Performed by: EMERGENCY MEDICINE

## 2020-10-03 PROCEDURE — 80053 COMPREHEN METABOLIC PANEL: CPT | Performed by: EMERGENCY MEDICINE

## 2020-10-03 PROCEDURE — 85025 COMPLETE CBC W/AUTO DIFF WBC: CPT | Performed by: EMERGENCY MEDICINE

## 2020-10-03 PROCEDURE — 99285 EMERGENCY DEPT VISIT HI MDM: CPT | Performed by: EMERGENCY MEDICINE

## 2020-10-03 PROCEDURE — 99285 EMERGENCY DEPT VISIT HI MDM: CPT

## 2020-10-03 PROCEDURE — 96361 HYDRATE IV INFUSION ADD-ON: CPT

## 2020-10-03 PROCEDURE — 1124F ACP DISCUSS-NO DSCNMKR DOCD: CPT | Performed by: EMERGENCY MEDICINE

## 2020-10-03 PROCEDURE — 87040 BLOOD CULTURE FOR BACTERIA: CPT | Performed by: EMERGENCY MEDICINE

## 2020-10-03 PROCEDURE — 86140 C-REACTIVE PROTEIN: CPT | Performed by: EMERGENCY MEDICINE

## 2020-10-03 PROCEDURE — 99221 1ST HOSP IP/OBS SF/LOW 40: CPT | Performed by: SPECIALIST

## 2020-10-03 PROCEDURE — 36415 COLL VENOUS BLD VENIPUNCTURE: CPT | Performed by: EMERGENCY MEDICINE

## 2020-10-03 PROCEDURE — 96360 HYDRATION IV INFUSION INIT: CPT

## 2020-10-03 PROCEDURE — 99220 PR INITIAL OBSERVATION CARE/DAY 70 MINUTES: CPT | Performed by: NURSE PRACTITIONER

## 2020-10-03 PROCEDURE — 83605 ASSAY OF LACTIC ACID: CPT | Performed by: EMERGENCY MEDICINE

## 2020-10-03 PROCEDURE — 74177 CT ABD & PELVIS W/CONTRAST: CPT

## 2020-10-03 RX ORDER — FAMOTIDINE 20 MG/1
20 TABLET, FILM COATED ORAL 2 TIMES DAILY
Status: DISCONTINUED | OUTPATIENT
Start: 2020-10-03 | End: 2020-10-06 | Stop reason: HOSPADM

## 2020-10-03 RX ORDER — FUROSEMIDE 40 MG/1
40 TABLET ORAL
Status: DISCONTINUED | OUTPATIENT
Start: 2020-10-04 | End: 2020-10-06 | Stop reason: HOSPADM

## 2020-10-03 RX ORDER — ONDANSETRON 2 MG/ML
4 INJECTION INTRAMUSCULAR; INTRAVENOUS EVERY 6 HOURS PRN
Status: DISCONTINUED | OUTPATIENT
Start: 2020-10-03 | End: 2020-10-06 | Stop reason: HOSPADM

## 2020-10-03 RX ORDER — CEFEPIME HYDROCHLORIDE 2 G/50ML
2000 INJECTION, SOLUTION INTRAVENOUS ONCE
Status: DISCONTINUED | OUTPATIENT
Start: 2020-10-03 | End: 2020-10-03

## 2020-10-03 RX ORDER — WARFARIN SODIUM 5 MG/1
2.5 TABLET ORAL
Status: DISCONTINUED | OUTPATIENT
Start: 2020-10-04 | End: 2020-10-06 | Stop reason: HOSPADM

## 2020-10-03 RX ORDER — OXYCODONE HYDROCHLORIDE 5 MG/1
5 TABLET ORAL EVERY 6 HOURS PRN
Status: DISCONTINUED | OUTPATIENT
Start: 2020-10-03 | End: 2020-10-06 | Stop reason: HOSPADM

## 2020-10-03 RX ORDER — DULOXETIN HYDROCHLORIDE 20 MG/1
20 CAPSULE, DELAYED RELEASE ORAL DAILY
Status: DISCONTINUED | OUTPATIENT
Start: 2020-10-04 | End: 2020-10-06 | Stop reason: HOSPADM

## 2020-10-03 RX ORDER — ALLOPURINOL 100 MG/1
100 TABLET ORAL DAILY
Status: DISCONTINUED | OUTPATIENT
Start: 2020-10-04 | End: 2020-10-06 | Stop reason: HOSPADM

## 2020-10-03 RX ORDER — FAMOTIDINE 20 MG/1
20 TABLET, FILM COATED ORAL 2 TIMES DAILY
Status: ON HOLD | COMMUNITY
End: 2021-11-18 | Stop reason: CLARIF

## 2020-10-03 RX ORDER — WARFARIN SODIUM 5 MG/1
5 TABLET ORAL
Status: DISCONTINUED | OUTPATIENT
Start: 2020-10-03 | End: 2020-10-06 | Stop reason: HOSPADM

## 2020-10-03 RX ORDER — VANCOMYCIN HYDROCHLORIDE 1 G/200ML
1000 INJECTION, SOLUTION INTRAVENOUS EVERY 24 HOURS
Status: DISCONTINUED | OUTPATIENT
Start: 2020-10-04 | End: 2020-10-03

## 2020-10-03 RX ADMIN — VANCOMYCIN HYDROCHLORIDE 1750 MG: 1 INJECTION, POWDER, LYOPHILIZED, FOR SOLUTION INTRAVENOUS at 17:45

## 2020-10-03 RX ADMIN — FAMOTIDINE 20 MG: 20 TABLET, FILM COATED ORAL at 20:59

## 2020-10-03 RX ADMIN — IOHEXOL 100 ML: 350 INJECTION, SOLUTION INTRAVENOUS at 14:07

## 2020-10-03 RX ADMIN — WARFARIN SODIUM 5 MG: 5 TABLET ORAL at 21:48

## 2020-10-03 RX ADMIN — Medication 125 MG: at 21:54

## 2020-10-03 RX ADMIN — SODIUM CHLORIDE 1000 ML: 0.9 INJECTION, SOLUTION INTRAVENOUS at 12:59

## 2020-10-04 LAB
ALBUMIN SERPL BCP-MCNC: 2.9 G/DL (ref 3.5–5.7)
ALP SERPL-CCNC: 85 U/L (ref 55–165)
ALT SERPL W P-5'-P-CCNC: 12 U/L (ref 7–52)
ANION GAP SERPL CALCULATED.3IONS-SCNC: 7 MMOL/L (ref 4–13)
AST SERPL W P-5'-P-CCNC: 14 U/L (ref 13–39)
BASOPHILS # BLD AUTO: 0 THOUSANDS/ΜL (ref 0–0.1)
BASOPHILS NFR BLD AUTO: 1 % (ref 0–2)
BILIRUB SERPL-MCNC: 0.9 MG/DL (ref 0.2–1)
BUN SERPL-MCNC: 22 MG/DL (ref 7–25)
CALCIUM ALBUM COR SERPL-MCNC: 9.7 MG/DL (ref 8.3–10.1)
CALCIUM SERPL-MCNC: 8.8 MG/DL (ref 8.6–10.5)
CHLORIDE SERPL-SCNC: 102 MMOL/L (ref 98–107)
CO2 SERPL-SCNC: 32 MMOL/L (ref 21–31)
CREAT SERPL-MCNC: 1 MG/DL (ref 0.6–1.2)
EOSINOPHIL # BLD AUTO: 0.2 THOUSAND/ΜL (ref 0–0.61)
EOSINOPHIL NFR BLD AUTO: 3 % (ref 0–5)
ERYTHROCYTE [DISTWIDTH] IN BLOOD BY AUTOMATED COUNT: 17.9 % (ref 11.5–14.5)
GFR SERPL CREATININE-BSD FRML MDRD: 58 ML/MIN/1.73SQ M
GLUCOSE SERPL-MCNC: 120 MG/DL (ref 65–99)
HCT VFR BLD AUTO: 35.6 % (ref 42–47)
HGB BLD-MCNC: 10.9 G/DL (ref 12–16)
INR PPP: 2.12 (ref 0.84–1.19)
LYMPHOCYTES # BLD AUTO: 1 THOUSANDS/ΜL (ref 0.6–4.47)
LYMPHOCYTES NFR BLD AUTO: 11 % (ref 21–51)
MCH RBC QN AUTO: 25.2 PG (ref 26–34)
MCHC RBC AUTO-ENTMCNC: 30.7 G/DL (ref 31–37)
MCV RBC AUTO: 82 FL (ref 81–99)
MONOCYTES # BLD AUTO: 0.5 THOUSAND/ΜL (ref 0.17–1.22)
MONOCYTES NFR BLD AUTO: 6 % (ref 2–12)
NEUTROPHILS # BLD AUTO: 7.7 THOUSANDS/ΜL (ref 1.4–6.5)
NEUTS SEG NFR BLD AUTO: 81 % (ref 42–75)
PLATELET # BLD AUTO: 414 THOUSANDS/UL (ref 149–390)
PMV BLD AUTO: 6.8 FL (ref 8.6–11.7)
POTASSIUM SERPL-SCNC: 4 MMOL/L (ref 3.5–5.5)
PROT SERPL-MCNC: 6.6 G/DL (ref 6.4–8.9)
PROTHROMBIN TIME: 23.5 SECONDS (ref 11.6–14.5)
RBC # BLD AUTO: 4.34 MILLION/UL (ref 3.9–5.2)
SODIUM SERPL-SCNC: 141 MMOL/L (ref 134–143)
WBC # BLD AUTO: 9.5 THOUSAND/UL (ref 4.8–10.8)

## 2020-10-04 PROCEDURE — 80053 COMPREHEN METABOLIC PANEL: CPT | Performed by: NURSE PRACTITIONER

## 2020-10-04 PROCEDURE — 99232 SBSQ HOSP IP/OBS MODERATE 35: CPT | Performed by: NURSE PRACTITIONER

## 2020-10-04 PROCEDURE — 99232 SBSQ HOSP IP/OBS MODERATE 35: CPT | Performed by: SPECIALIST

## 2020-10-04 PROCEDURE — 99221 1ST HOSP IP/OBS SF/LOW 40: CPT | Performed by: INTERNAL MEDICINE

## 2020-10-04 PROCEDURE — 85610 PROTHROMBIN TIME: CPT | Performed by: NURSE PRACTITIONER

## 2020-10-04 PROCEDURE — 85025 COMPLETE CBC W/AUTO DIFF WBC: CPT | Performed by: NURSE PRACTITIONER

## 2020-10-04 RX ORDER — ACETAMINOPHEN 325 MG/1
650 TABLET ORAL EVERY 6 HOURS PRN
Status: DISCONTINUED | OUTPATIENT
Start: 2020-10-04 | End: 2020-10-06 | Stop reason: HOSPADM

## 2020-10-04 RX ADMIN — DULOXETINE HYDROCHLORIDE 20 MG: 20 CAPSULE, DELAYED RELEASE ORAL at 08:14

## 2020-10-04 RX ADMIN — FUROSEMIDE 40 MG: 40 TABLET ORAL at 05:05

## 2020-10-04 RX ADMIN — ENOXAPARIN SODIUM 40 MG: 40 INJECTION SUBCUTANEOUS at 08:13

## 2020-10-04 RX ADMIN — VANCOMYCIN HYDROCHLORIDE 2000 MG: 1 INJECTION, POWDER, LYOPHILIZED, FOR SOLUTION INTRAVENOUS at 05:59

## 2020-10-04 RX ADMIN — OXYCODONE HYDROCHLORIDE 5 MG: 5 TABLET ORAL at 22:18

## 2020-10-04 RX ADMIN — Medication 125 MG: at 08:13

## 2020-10-04 RX ADMIN — Medication 125 MG: at 21:59

## 2020-10-04 RX ADMIN — ACETAMINOPHEN 650 MG: 325 TABLET ORAL at 09:28

## 2020-10-04 RX ADMIN — WARFARIN SODIUM 2.5 MG: 5 TABLET ORAL at 17:40

## 2020-10-04 RX ADMIN — VANCOMYCIN HYDROCHLORIDE 2000 MG: 1 INJECTION, POWDER, LYOPHILIZED, FOR SOLUTION INTRAVENOUS at 17:39

## 2020-10-04 RX ADMIN — LEVOTHYROXINE SODIUM 125 MCG: 75 TABLET ORAL at 05:05

## 2020-10-04 RX ADMIN — FAMOTIDINE 20 MG: 20 TABLET, FILM COATED ORAL at 17:40

## 2020-10-04 RX ADMIN — ALLOPURINOL 100 MG: 100 TABLET ORAL at 08:14

## 2020-10-04 RX ADMIN — FAMOTIDINE 20 MG: 20 TABLET, FILM COATED ORAL at 08:14

## 2020-10-05 LAB
INR PPP: 2.24 (ref 0.84–1.19)
PROTHROMBIN TIME: 24.5 SECONDS (ref 11.6–14.5)
VANCOMYCIN TROUGH SERPL-MCNC: 25.2 UG/ML (ref 5–12)

## 2020-10-05 PROCEDURE — 99232 SBSQ HOSP IP/OBS MODERATE 35: CPT | Performed by: SPECIALIST

## 2020-10-05 PROCEDURE — 85610 PROTHROMBIN TIME: CPT | Performed by: NURSE PRACTITIONER

## 2020-10-05 PROCEDURE — 80202 ASSAY OF VANCOMYCIN: CPT | Performed by: NURSE PRACTITIONER

## 2020-10-05 PROCEDURE — 99232 SBSQ HOSP IP/OBS MODERATE 35: CPT | Performed by: INTERNAL MEDICINE

## 2020-10-05 RX ORDER — VANCOMYCIN HYDROCHLORIDE 1 G/200ML
1000 INJECTION, SOLUTION INTRAVENOUS EVERY 12 HOURS
Status: DISCONTINUED | OUTPATIENT
Start: 2020-10-05 | End: 2020-10-06 | Stop reason: HOSPADM

## 2020-10-05 RX ADMIN — FAMOTIDINE 20 MG: 20 TABLET, FILM COATED ORAL at 17:09

## 2020-10-05 RX ADMIN — FAMOTIDINE 20 MG: 20 TABLET, FILM COATED ORAL at 08:37

## 2020-10-05 RX ADMIN — VANCOMYCIN HYDROCHLORIDE 1000 MG: 1 INJECTION, SOLUTION INTRAVENOUS at 17:09

## 2020-10-05 RX ADMIN — OXYCODONE HYDROCHLORIDE 5 MG: 5 TABLET ORAL at 23:40

## 2020-10-05 RX ADMIN — DULOXETINE HYDROCHLORIDE 20 MG: 20 CAPSULE, DELAYED RELEASE ORAL at 08:35

## 2020-10-05 RX ADMIN — ALLOPURINOL 100 MG: 100 TABLET ORAL at 08:30

## 2020-10-05 RX ADMIN — WARFARIN SODIUM 5 MG: 5 TABLET ORAL at 20:43

## 2020-10-05 RX ADMIN — OXYCODONE HYDROCHLORIDE 5 MG: 5 TABLET ORAL at 10:29

## 2020-10-05 RX ADMIN — Medication 125 MG: at 20:43

## 2020-10-05 RX ADMIN — Medication 125 MG: at 08:37

## 2020-10-05 RX ADMIN — ENOXAPARIN SODIUM 40 MG: 40 INJECTION SUBCUTANEOUS at 08:36

## 2020-10-05 RX ADMIN — LEVOTHYROXINE SODIUM 125 MCG: 75 TABLET ORAL at 06:42

## 2020-10-06 ENCOUNTER — EPISODE CHANGES (OUTPATIENT)
Dept: CASE MANAGEMENT | Facility: HOSPITAL | Age: 70
End: 2020-10-06

## 2020-10-06 ENCOUNTER — EPISODE CHANGES (OUTPATIENT)
Dept: CASE MANAGEMENT | Facility: OTHER | Age: 70
End: 2020-10-06

## 2020-10-06 VITALS
DIASTOLIC BLOOD PRESSURE: 59 MMHG | WEIGHT: 293 LBS | RESPIRATION RATE: 18 BRPM | HEART RATE: 55 BPM | HEIGHT: 64 IN | SYSTOLIC BLOOD PRESSURE: 108 MMHG | BODY MASS INDEX: 50.02 KG/M2 | TEMPERATURE: 96.9 F | OXYGEN SATURATION: 96 %

## 2020-10-06 PROCEDURE — 99239 HOSP IP/OBS DSCHRG MGMT >30: CPT | Performed by: INTERNAL MEDICINE

## 2020-10-06 RX ORDER — VANCOMYCIN HYDROCHLORIDE 125 MG/1
125 CAPSULE ORAL 2 TIMES DAILY
Qty: 20 CAPSULE | Refills: 0 | Status: SHIPPED | OUTPATIENT
Start: 2020-10-06 | End: 2020-10-16

## 2020-10-06 RX ORDER — DOXYCYCLINE 100 MG/1
100 TABLET ORAL 2 TIMES DAILY
Qty: 14 TABLET | Refills: 0 | Status: SHIPPED | OUTPATIENT
Start: 2020-10-06 | End: 2020-10-13

## 2020-10-06 RX ADMIN — FAMOTIDINE 20 MG: 20 TABLET, FILM COATED ORAL at 08:03

## 2020-10-06 RX ADMIN — VANCOMYCIN HYDROCHLORIDE 1000 MG: 1 INJECTION, SOLUTION INTRAVENOUS at 06:11

## 2020-10-06 RX ADMIN — ENOXAPARIN SODIUM 40 MG: 40 INJECTION SUBCUTANEOUS at 08:02

## 2020-10-06 RX ADMIN — DULOXETINE HYDROCHLORIDE 20 MG: 20 CAPSULE, DELAYED RELEASE ORAL at 08:02

## 2020-10-06 RX ADMIN — LEVOTHYROXINE SODIUM 125 MCG: 75 TABLET ORAL at 06:10

## 2020-10-06 RX ADMIN — ALLOPURINOL 100 MG: 100 TABLET ORAL at 08:03

## 2020-10-06 RX ADMIN — FUROSEMIDE 40 MG: 40 TABLET ORAL at 06:10

## 2020-10-06 RX ADMIN — Medication 125 MG: at 08:02

## 2020-10-07 ENCOUNTER — PATIENT OUTREACH (OUTPATIENT)
Dept: CASE MANAGEMENT | Facility: OTHER | Age: 70
End: 2020-10-07

## 2020-10-09 LAB
BACTERIA BLD CULT: NORMAL
BACTERIA BLD CULT: NORMAL

## 2020-10-14 ENCOUNTER — PATIENT OUTREACH (OUTPATIENT)
Dept: CASE MANAGEMENT | Facility: OTHER | Age: 70
End: 2020-10-14

## 2020-10-15 ENCOUNTER — HOSPITAL ENCOUNTER (EMERGENCY)
Facility: HOSPITAL | Age: 70
Discharge: HOME | End: 2020-10-15
Attending: EMERGENCY MEDICINE | Admitting: EMERGENCY MEDICINE
Payer: MEDICARE

## 2020-10-15 VITALS
HEART RATE: 58 BPM | TEMPERATURE: 98.1 F | RESPIRATION RATE: 18 BRPM | OXYGEN SATURATION: 95 % | SYSTOLIC BLOOD PRESSURE: 114 MMHG | DIASTOLIC BLOOD PRESSURE: 77 MMHG

## 2020-10-15 DIAGNOSIS — L03.90 CELLULITIS: Primary | ICD-10-CM

## 2020-10-15 LAB
ANION GAP SERPL CALCULATED.3IONS-SCNC: 6 MMOL/L (ref 4–13)
BASOPHILS # BLD AUTO: 0.1 THOUSANDS/ΜL (ref 0–0.1)
BASOPHILS NFR BLD AUTO: 1 % (ref 0–2)
BUN SERPL-MCNC: 22 MG/DL (ref 7–25)
CALCIUM SERPL-MCNC: 8.8 MG/DL (ref 8.6–10.5)
CHLORIDE SERPL-SCNC: 99 MMOL/L (ref 98–107)
CO2 SERPL-SCNC: 34 MMOL/L (ref 21–31)
CREAT SERPL-MCNC: 0.99 MG/DL (ref 0.6–1.2)
EOSINOPHIL # BLD AUTO: 0.3 THOUSAND/ΜL (ref 0–0.61)
EOSINOPHIL NFR BLD AUTO: 3 % (ref 0–5)
ERYTHROCYTE [DISTWIDTH] IN BLOOD BY AUTOMATED COUNT: 17.6 % (ref 11.5–14.5)
GFR SERPL CREATININE-BSD FRML MDRD: 58 ML/MIN/1.73SQ M
GLUCOSE SERPL-MCNC: 102 MG/DL (ref 65–99)
HCT VFR BLD AUTO: 37.5 % (ref 42–47)
HGB BLD-MCNC: 12 G/DL (ref 12–16)
LYMPHOCYTES # BLD AUTO: 1.3 THOUSANDS/ΜL (ref 0.6–4.47)
LYMPHOCYTES NFR BLD AUTO: 16 % (ref 21–51)
MCH RBC QN AUTO: 25.7 PG (ref 26–34)
MCHC RBC AUTO-ENTMCNC: 31.9 G/DL (ref 31–37)
MCV RBC AUTO: 81 FL (ref 81–99)
MONOCYTES # BLD AUTO: 0.5 THOUSAND/ΜL (ref 0.17–1.22)
MONOCYTES NFR BLD AUTO: 6 % (ref 2–12)
NEUTROPHILS # BLD AUTO: 5.9 THOUSANDS/ΜL (ref 1.4–6.5)
NEUTS SEG NFR BLD AUTO: 74 % (ref 42–75)
PLATELET # BLD AUTO: 432 THOUSANDS/UL (ref 149–390)
PMV BLD AUTO: 6.6 FL (ref 8.6–11.7)
POTASSIUM SERPL-SCNC: 3.6 MMOL/L (ref 3.5–5.5)
RBC # BLD AUTO: 4.66 MILLION/UL (ref 3.9–5.2)
SODIUM SERPL-SCNC: 139 MMOL/L (ref 134–143)
WBC # BLD AUTO: 7.9 THOUSAND/UL (ref 4.8–10.8)

## 2020-10-15 PROCEDURE — 80048 BASIC METABOLIC PNL TOTAL CA: CPT | Performed by: EMERGENCY MEDICINE

## 2020-10-15 PROCEDURE — 85025 COMPLETE CBC W/AUTO DIFF WBC: CPT | Performed by: EMERGENCY MEDICINE

## 2020-10-15 PROCEDURE — 36415 COLL VENOUS BLD VENIPUNCTURE: CPT | Performed by: EMERGENCY MEDICINE

## 2020-10-15 PROCEDURE — 99283 EMERGENCY DEPT VISIT LOW MDM: CPT

## 2020-10-15 PROCEDURE — 99284 EMERGENCY DEPT VISIT MOD MDM: CPT | Performed by: EMERGENCY MEDICINE

## 2020-10-22 ENCOUNTER — PATIENT OUTREACH (OUTPATIENT)
Dept: CASE MANAGEMENT | Facility: OTHER | Age: 70
End: 2020-10-22

## 2020-11-10 ENCOUNTER — PATIENT OUTREACH (OUTPATIENT)
Dept: CASE MANAGEMENT | Facility: OTHER | Age: 70
End: 2020-11-10

## 2020-11-19 ENCOUNTER — PATIENT OUTREACH (OUTPATIENT)
Dept: CASE MANAGEMENT | Facility: OTHER | Age: 70
End: 2020-11-19

## 2020-12-16 ENCOUNTER — PATIENT OUTREACH (OUTPATIENT)
Dept: CASE MANAGEMENT | Facility: OTHER | Age: 70
End: 2020-12-16

## 2021-01-04 ENCOUNTER — PATIENT OUTREACH (OUTPATIENT)
Dept: CASE MANAGEMENT | Facility: OTHER | Age: 71
End: 2021-01-04

## 2021-01-04 NOTE — PROGRESS NOTES
Outreach TC to patient for CM assessment  Patient reports that she has a small skin tear under belly fold  Patient reports redness and fluid buildup all across her abdomen  Patient reports L hip has a big fluid build up  Patient describes it as a "cantalope" hanging on her L side  Patient reports that her PCP a ORACIO is scheduled to make a visit on 1/7/2021  Patient is to discuss treatement   Reviewed home medications, s/sx to report, future appointments and how/when to report symptoms to provider vs 911  Patient verbalizes understanding  Educated on episode end  Patient understands  BPCI episode closed, removed self from care team and updated care coordination note

## 2021-06-11 NOTE — PROGRESS NOTES
Initial decision to Admit Inpatient, subsequent order for Observation, Dr. Ocampo,  Physician Advisor concurs that services are appropriate as Observation.    Outpatient Care Management Note: Reached out to patient and introduced myself and my role in her care  She has applied for transportations tickets with [facility] transportation   Will make appointment with Dr Darin Mcadams when she obtains them  Following her low sodium diet and fluid restrictions without issues  Having left leg swelling , looking forward to having  OT and PT come into her home again after her appointment with Dr Darin Mcadams  Not taking Lasix as prescribed by cardiology due to low blood pressure  Encouraged to call the office to discuss due to her leg swelling and recent admission for CHF

## 2021-08-17 ENCOUNTER — OFFICE VISIT (OUTPATIENT)
Dept: SURGERY | Facility: CLINIC | Age: 71
End: 2021-08-17
Payer: MEDICARE

## 2021-08-17 VITALS
BODY MASS INDEX: 84.68 KG/M2 | DIASTOLIC BLOOD PRESSURE: 72 MMHG | TEMPERATURE: 96.9 F | SYSTOLIC BLOOD PRESSURE: 111 MMHG | HEIGHT: 62 IN | HEART RATE: 72 BPM

## 2021-08-17 DIAGNOSIS — E66.01 MORBID OBESITY WITH BMI OF 70 AND OVER, ADULT (HCC): ICD-10-CM

## 2021-08-17 DIAGNOSIS — I89.0 LYMPHEDEMA OF EXTREMITY: ICD-10-CM

## 2021-08-17 DIAGNOSIS — L03.311 CELLULITIS OF ABDOMINAL WALL: Primary | ICD-10-CM

## 2021-08-17 PROCEDURE — 99214 OFFICE O/P EST MOD 30 MIN: CPT | Performed by: SURGERY

## 2021-08-19 PROBLEM — I89.0 LYMPHEDEMA OF EXTREMITY: Status: ACTIVE | Noted: 2021-08-19

## 2021-08-20 NOTE — ASSESSMENT & PLAN NOTE
Super morbid obesity with a BMI of 84  This is likely contributing to patient's recurrent cellulitis of the pannus  Likely excessive moisture is the culprit with potential ingrown hairs in contributing to folliculitis associate subsequent cellulitis  Again weight loss counseling undertaken and patient again recommended to follow up with the weight loss center in addition to potential bariatric surgery for  Sleeve gastrectomy versus Kanu-en-Y bypass  Patient does have bariatric surgery information and is still considering consultation

## 2021-08-20 NOTE — PROGRESS NOTES
Assessment/Plan:    Lymphedema of extremity   Lymphedema bilateral lower extremities  Patient expressed interest in seeking care from lymphedema clinic the at think this is reasonable  She does have nurses a visit her house  I would consider again wrapping  her lower extremities bilaterally  Referral made to lymphedema clinic  Cellulitis of abdominal wall    No significant cellulitis noted  appears resolved  atthe base the lower pannus in bilateral lower flanks  Patient states that she occasionally gets flare ups is specially the hair follicles  I suspect this is secondary to her morbid obesity  No obvious evidence of hidradenitis noted  Discussed potentially reaching out again to bariatric surgery and patient does express interest in has there number  Plastic surgery is a possibility for panniculectomy however I think patient would benefit from weight loss surgery prior to undergoing a type of plastic surgery reconstruction surgery  Patient agrees  No follow-up needed with General surgery  Follow-up p r n       Morbid obesity with BMI of 70 and over, adult ([facility] )    Super morbid obesity with a BMI of 84  This is likely contributing to patient's recurrent cellulitis of the pannus  Likely excessive moisture is the culprit with potential ingrown hairs in contributing to folliculitis associate subsequent cellulitis  Again weight loss counseling undertaken and patient again recommended to follow up with the weight loss center in addition to potential bariatric surgery for  Sleeve gastrectomy versus Kanu-en-Y bypass  Patient does have bariatric surgery information and is still considering consultation  Diagnoses and all orders for this visit:    Cellulitis of abdominal wall    Lymphedema of extremity  -     Ambulatory referral to PT/OT lymphedema therapy; Future    Morbid obesity with BMI of 70 and over, adult [facility])          Subjective:      Patient ID: Elzbieta Stewart is a 80 y o  female  81-year-old female with numerous comorbidities including super morbid obesity, patient does not ambulate, recurrent cellulitis of pannus,  AFib, CHF, hypothyroidism, GERD, presents today  As a new patient for discussion about recurrent cellulitis  Patient does have recent hospitalizations for cellulitis of her lower abdomen and bilateral flanks  Patient states she feels this is likely secondary to her large body habitus and development of folliculitis  She has a friend that was treated by me in the past and thus she wanted to come here my opinion  She realizes she is morbidly obese and she has thought about undergoing a potential bariatric evaluation for weight loss surgery  Currently denies any significant abdominal pain  Patient states that she is nonambulatory  She does  Have excessive moisture underneath her pannus which she controls with gauze  No current infection as per the patient but given the recurrent infection she was my expert opinion  She also complains of some significant swelling of her legs  Denies any worsening shortness of breath she is not on oxygen  No worsening chest pain  No abdominal pain  No fevers or chills  The following portions of the patient's history were reviewed and updated as appropriate:   She  has a past medical history of Atrial fibrillation ([address] ), Bladder stone, C  difficile colitis, Cellulitis, CHF (congestive heart failure) ([address] ), Disease of thyroid gland, Diverticulitis, Enterocolitis, Kidney stone, Lymphedema, Menopause, MRSA (methicillin resistant Staphylococcus aureus), Osteoarthritis, Phlebitis, and Spondylolysis    She   Patient Active Problem List    Diagnosis Date Noted    Lymphedema of extremity 08/19/2021    Paroxysmal atrial fibrillation ([address] ) 10/03/2020    Other specified hypothyroidism 10/03/2020    Mild episode of recurrent major depressive disorder ([address] ) 10/03/2020    Idiopathic chronic 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 10/03/2020    Primary osteoarthritis involving multiple joints 10/03/2020    Morbid obesity with BMI of 70 and over, adult ([facility] ) 10/03/2020    Chronic diastolic congestive heart failure ([facility] ) 10/03/2020    Cellulitis of abdominal wall 10/03/2020    Gastroesophageal reflux disease without esophagitis 10/03/2020     She  has a past surgical history that includes Appendectomy; Cholecystectomy; Knee surgery; Carpal tunnel release; Cystoscopy; Foot surgery (1982); and [place] tooth extraction  Her family history includes Diabetes type II in her brother, father, and sister; Heart disease in her father and mother; Heart failure in her father and mother; Kidney disease in her father; Lymphoma in her mother; Uterine cancer in her paternal aunt  She  reports that she has quit smoking  She has never used smokeless tobacco  She reports that she does not drink alcohol and does not use drugs    Current Outpatient Medications   Medication Sig Dispense Refill    acetaminophen (TYLENOL) 650 mg CR tablet Take 650 mg by mouth every 8 (eight) hours      allopurinol (ZYLOPRIM) 100 mg tablet Take 1 tablet (100 mg total) by mouth daily 30 tablet 5    DULoxetine (CYMBALTA) 20 mg capsule Take 1 capsule (20 mg total) by mouth daily 30 capsule 5    furosemide (LASIX) 40 mg tablet Take 1 tablet (40 mg total) by mouth daily (Patient taking differently: Take 60 mg by mouth daily ) 30 tablet 5    levothyroxine 88 mcg tablet Take 1 tablet (88 mcg total) by mouth daily (Patient taking differently: Take 125 mcg by mouth daily ) 30 tablet 5    warfarin (COUMADIN) 2 5 mg tablet Take 1 tablet (2 5 mg total) by mouth daily (Patient taking differently: Take 2 5 mg by mouth every other day Monday, Wednesday, Friday, sunday) 30 tablet 5    warfarin (COUMADIN) 4 mg tablet Take 1 tablet daily as directed (Patient taking differently: 5 mg every other day Tuesday, Thursday, saturday) 30 tablet 0    famotidine (PEPCID) 20 mg tablet Take 20 mg by mouth 2 (two) times a day (Patient not taking: Reported on 8/17/2021)       No current facility-administered medications for this visit  She is allergic to augmentin es-600  [amoxicillin-pot clavulanate], penicillins, sulfa antibiotics, vitamin c [ascorbate - food allergy], and latex       Review of Systems        Review systems completed, all negative except as noted above HPI  Objective:      /72 (BP Location: Right arm, Patient Position: Sitting, Cuff Size: Extra-Large)   Pulse 72   Temp (!) 96 9 °F (36 1 °C) (Tympanic)   Ht 5' 2" (1 575 m)   BMI 84 68 kg/m²          Physical Exam  Vitals reviewed  Constitutional:       General: She is not in acute distress  Appearance: Normal appearance  She is obese  She is not ill-appearing, toxic-appearing or diaphoretic  HENT:      Head: Normocephalic and atraumatic  Right Ear: External ear normal       Left Ear: External ear normal    Eyes:      General:         Right eye: No discharge  Left eye: No discharge  Cardiovascular:      Rate and Rhythm: Normal rate  Pulmonary:      Effort: Pulmonary effort is normal  No respiratory distress  Abdominal:      General: There is no distension  Palpations: There is no mass  Tenderness: There is no abdominal tenderness  Hernia: No hernia is present  Musculoskeletal:      Right lower leg: No edema  Left lower leg: No edema  Comments: Patient confined to stretcher  Nonambulatory   Skin:     General: Skin is warm and dry  Coloration: Skin is not jaundiced or pale  Findings: No bruising or erythema  Comments: No active cellulitis noted  Within the skin fold of the pannus there is excessive moisture which is being controlled with gauze  No evidence of infection  Neurological:      General: No focal deficit present  Mental Status: She is alert and oriented to person, place, and time     Psychiatric:         Mood and Affect: Mood normal  Behavior: Behavior normal          Thought Content: Thought content normal          Judgment: Judgment normal              Note:     Patient is ED notes from October 15, 2020 in addition to  Discharge summary note from her recent admission back in October 6, at [facility] POINT it more reviewed personally by me

## 2021-08-20 NOTE — ASSESSMENT & PLAN NOTE
No significant cellulitis noted  appears resolved  atthe base the lower pannus in bilateral lower flanks  Patient states that she occasionally gets flare ups is specially the hair follicles  I suspect this is secondary to her morbid obesity  No obvious evidence of hidradenitis noted  Discussed potentially reaching out again to bariatric surgery and patient does express interest in has there number  Plastic surgery is a possibility for panniculectomy however I think patient would benefit from weight loss surgery prior to undergoing a type of plastic surgery reconstruction surgery  Patient agrees  No follow-up needed with General surgery  Follow-up p r merlyn Davis

## 2021-08-20 NOTE — ASSESSMENT & PLAN NOTE
Lymphedema bilateral lower extremities  Patient expressed interest in seeking care from lymphedema clinic the at think this is reasonable  She does have nurses a visit her house  I would consider again wrapping  her lower extremities bilaterally  Referral made to lymphedema clinic

## 2021-10-12 ENCOUNTER — OFFICE VISIT (OUTPATIENT)
Dept: OBGYN CLINIC | Facility: CLINIC | Age: 71
End: 2021-10-12
Payer: MEDICARE

## 2021-10-12 VITALS — DIASTOLIC BLOOD PRESSURE: 80 MMHG | SYSTOLIC BLOOD PRESSURE: 114 MMHG | BODY MASS INDEX: 79.47 KG/M2 | HEIGHT: 64 IN

## 2021-10-12 DIAGNOSIS — N95.0 PMB (POSTMENOPAUSAL BLEEDING): Primary | ICD-10-CM

## 2021-10-12 DIAGNOSIS — Z12.4 ENCOUNTER FOR PAPANICOLAOU SMEAR FOR CERVICAL CANCER SCREENING: ICD-10-CM

## 2021-10-12 PROCEDURE — G0124 SCREEN C/V THIN LAYER BY MD: HCPCS | Performed by: PATHOLOGY

## 2021-10-12 PROCEDURE — G0145 SCR C/V CYTO,THINLAYER,RESCR: HCPCS | Performed by: PATHOLOGY

## 2021-10-12 PROCEDURE — 99203 OFFICE O/P NEW LOW 30 MIN: CPT | Performed by: NURSE PRACTITIONER

## 2021-10-12 RX ORDER — FUROSEMIDE 80 MG
TABLET ORAL
COMMUNITY
Start: 2021-10-04 | End: 2021-11-21 | Stop reason: HOSPADM

## 2021-10-15 ENCOUNTER — ANTICOAG VISIT (OUTPATIENT)
Dept: NEPHROLOGY | Facility: CLINIC | Age: 71
End: 2021-10-15

## 2021-10-15 ENCOUNTER — TELEPHONE (OUTPATIENT)
Dept: NEPHROLOGY | Facility: CLINIC | Age: 71
End: 2021-10-15

## 2021-10-19 LAB
LAB AP GYN PRIMARY INTERPRETATION: NORMAL
Lab: NORMAL
PATH INTERP SPEC-IMP: NORMAL

## 2021-10-28 ENCOUNTER — TELEPHONE (OUTPATIENT)
Dept: OBGYN CLINIC | Facility: CLINIC | Age: 71
End: 2021-10-28

## 2021-11-18 ENCOUNTER — HOSPITAL ENCOUNTER (EMERGENCY)
Facility: HOSPITAL | Age: 71
DRG: 872 | End: 2021-11-18
Attending: FAMILY MEDICINE
Payer: MEDICARE

## 2021-11-18 ENCOUNTER — HOSPITAL ENCOUNTER (INPATIENT)
Facility: HOSPITAL | Age: 71
LOS: 3 days | Discharge: HOME | DRG: 872 | End: 2021-11-21
Attending: INTERNAL MEDICINE | Admitting: INTERNAL MEDICINE
Payer: MEDICARE

## 2021-11-18 VITALS
SYSTOLIC BLOOD PRESSURE: 110 MMHG | HEART RATE: 90 BPM | HEIGHT: 64 IN | BODY MASS INDEX: 50.02 KG/M2 | WEIGHT: 293 LBS | DIASTOLIC BLOOD PRESSURE: 65 MMHG | TEMPERATURE: 100.5 F | OXYGEN SATURATION: 99 % | RESPIRATION RATE: 21 BRPM

## 2021-11-18 DIAGNOSIS — L03.116 BILATERAL CELLULITIS OF LOWER LEG: Primary | ICD-10-CM

## 2021-11-18 DIAGNOSIS — L03.311 CELLULITIS OF ABDOMINAL WALL: ICD-10-CM

## 2021-11-18 DIAGNOSIS — A41.9 SEPSIS (HCC): ICD-10-CM

## 2021-11-18 DIAGNOSIS — A41.9 SEPSIS WITHOUT ACUTE ORGAN DYSFUNCTION, DUE TO UNSPECIFIED ORGANISM (HCC): Primary | ICD-10-CM

## 2021-11-18 DIAGNOSIS — L03.115 BILATERAL CELLULITIS OF LOWER LEG: Primary | ICD-10-CM

## 2021-11-18 PROBLEM — E87.6 HYPOKALEMIA: Status: ACTIVE | Noted: 2021-11-18

## 2021-11-18 PROBLEM — E03.8 OTHER SPECIFIED HYPOTHYROIDISM: Chronic | Status: ACTIVE | Noted: 2020-10-03

## 2021-11-18 PROBLEM — L03.90 CELLULITIS: Status: ACTIVE | Noted: 2020-10-03

## 2021-11-18 PROBLEM — F33.0 MILD EPISODE OF RECURRENT MAJOR DEPRESSIVE DISORDER (HCC): Chronic | Status: ACTIVE | Noted: 2020-10-03

## 2021-11-18 PROBLEM — E66.01 MORBID OBESITY WITH BMI OF 60.0-69.9, ADULT (HCC): Chronic | Status: ACTIVE | Noted: 2020-10-03

## 2021-11-18 LAB
ANION GAP SERPL CALCULATED.3IONS-SCNC: 8 MMOL/L (ref 4–13)
BUN SERPL-MCNC: 29 MG/DL (ref 7–25)
CALCIUM SERPL-MCNC: 9.1 MG/DL (ref 8.6–10.5)
CHLORIDE SERPL-SCNC: 93 MMOL/L (ref 98–107)
CO2 SERPL-SCNC: 33 MMOL/L (ref 21–31)
CREAT SERPL-MCNC: 1.01 MG/DL (ref 0.6–1.2)
ERYTHROCYTE [DISTWIDTH] IN BLOOD BY AUTOMATED COUNT: 16.6 % (ref 11.5–14.5)
GFR SERPL CREATININE-BSD FRML MDRD: 57 ML/MIN/1.73SQ M
GLUCOSE SERPL-MCNC: 159 MG/DL (ref 65–99)
HCT VFR BLD AUTO: 35.8 % (ref 42–47)
HGB BLD-MCNC: 11.3 G/DL (ref 12–16)
INR PPP: 2.35 (ref 0.84–1.19)
LACTATE SERPL-SCNC: 2 MMOL/L (ref 0.5–2)
LYMPHOCYTES # BLD AUTO: 0.77 THOUSAND/UL (ref 0.6–4.47)
LYMPHOCYTES # BLD AUTO: 3 % (ref 20–51)
MCH RBC QN AUTO: 26.3 PG (ref 26–34)
MCHC RBC AUTO-ENTMCNC: 31.5 G/DL (ref 31–37)
MCV RBC AUTO: 83 FL (ref 81–99)
MONOCYTES # BLD AUTO: 0.26 THOUSAND/UL (ref 0–1.22)
MONOCYTES NFR BLD AUTO: 1 % (ref 4–12)
NEUTS BAND NFR BLD MANUAL: 4 % (ref 0–8)
NEUTS SEG # BLD: 24.58 THOUSAND/UL (ref 1.81–6.82)
NEUTS SEG NFR BLD AUTO: 92 % (ref 42–75)
PLATELET # BLD AUTO: 632 THOUSANDS/UL (ref 149–390)
PLATELET BLD QL SMEAR: ABNORMAL
PMV BLD AUTO: 7.1 FL (ref 8.6–11.7)
POTASSIUM SERPL-SCNC: 3.3 MMOL/L (ref 3.5–5.5)
PROTHROMBIN TIME: 24.6 SECONDS (ref 11.6–14.5)
RBC # BLD AUTO: 4.29 MILLION/UL (ref 3.9–5.2)
RBC MORPH BLD: NORMAL
SODIUM SERPL-SCNC: 134 MMOL/L (ref 134–143)
TOTAL CELLS COUNTED SPEC: 100
WBC # BLD AUTO: 25.6 THOUSAND/UL (ref 4.8–10.8)

## 2021-11-18 PROCEDURE — 99222 1ST HOSP IP/OBS MODERATE 55: CPT | Performed by: INTERNAL MEDICINE

## 2021-11-18 PROCEDURE — 96366 THER/PROPH/DIAG IV INF ADDON: CPT

## 2021-11-18 PROCEDURE — 87040 BLOOD CULTURE FOR BACTERIA: CPT | Performed by: FAMILY MEDICINE

## 2021-11-18 PROCEDURE — 80048 BASIC METABOLIC PNL TOTAL CA: CPT | Performed by: FAMILY MEDICINE

## 2021-11-18 PROCEDURE — 85027 COMPLETE CBC AUTOMATED: CPT | Performed by: FAMILY MEDICINE

## 2021-11-18 PROCEDURE — 96375 TX/PRO/DX INJ NEW DRUG ADDON: CPT

## 2021-11-18 PROCEDURE — 83605 ASSAY OF LACTIC ACID: CPT | Performed by: FAMILY MEDICINE

## 2021-11-18 PROCEDURE — 85007 BL SMEAR W/DIFF WBC COUNT: CPT | Performed by: FAMILY MEDICINE

## 2021-11-18 PROCEDURE — 36415 COLL VENOUS BLD VENIPUNCTURE: CPT | Performed by: FAMILY MEDICINE

## 2021-11-18 PROCEDURE — 85610 PROTHROMBIN TIME: CPT | Performed by: PHYSICIAN ASSISTANT

## 2021-11-18 PROCEDURE — 99284 EMERGENCY DEPT VISIT MOD MDM: CPT

## 2021-11-18 PROCEDURE — 99223 1ST HOSP IP/OBS HIGH 75: CPT | Performed by: PHYSICIAN ASSISTANT

## 2021-11-18 PROCEDURE — 99285 EMERGENCY DEPT VISIT HI MDM: CPT | Performed by: FAMILY MEDICINE

## 2021-11-18 PROCEDURE — 96365 THER/PROPH/DIAG IV INF INIT: CPT

## 2021-11-18 PROCEDURE — 1124F ACP DISCUSS-NO DSCNMKR DOCD: CPT | Performed by: INTERNAL MEDICINE

## 2021-11-18 RX ORDER — ACETAMINOPHEN 325 MG/1
650 TABLET ORAL EVERY 4 HOURS PRN
Status: DISCONTINUED | OUTPATIENT
Start: 2021-11-18 | End: 2021-11-21 | Stop reason: HOSPADM

## 2021-11-18 RX ORDER — LEVOTHYROXINE SODIUM 0.12 MG/1
125 TABLET ORAL DAILY
COMMUNITY

## 2021-11-18 RX ORDER — WARFARIN SODIUM 2 MG/1
2 TABLET ORAL
Status: DISCONTINUED | OUTPATIENT
Start: 2021-11-19 | End: 2021-11-21 | Stop reason: HOSPADM

## 2021-11-18 RX ORDER — WARFARIN SODIUM 2 MG/1
4 TABLET ORAL
Status: DISCONTINUED | OUTPATIENT
Start: 2021-11-20 | End: 2021-11-21 | Stop reason: HOSPADM

## 2021-11-18 RX ORDER — CEFEPIME HYDROCHLORIDE 2 G/50ML
2000 INJECTION, SOLUTION INTRAVENOUS ONCE
Status: COMPLETED | OUTPATIENT
Start: 2021-11-18 | End: 2021-11-18

## 2021-11-18 RX ORDER — ONDANSETRON 2 MG/ML
4 INJECTION INTRAMUSCULAR; INTRAVENOUS EVERY 6 HOURS PRN
Status: DISCONTINUED | OUTPATIENT
Start: 2021-11-18 | End: 2021-11-21 | Stop reason: HOSPADM

## 2021-11-18 RX ORDER — DULOXETIN HYDROCHLORIDE 20 MG/1
20 CAPSULE, DELAYED RELEASE ORAL DAILY
Status: DISCONTINUED | OUTPATIENT
Start: 2021-11-19 | End: 2021-11-21 | Stop reason: HOSPADM

## 2021-11-18 RX ORDER — ALLOPURINOL 100 MG/1
100 TABLET ORAL DAILY
Status: DISCONTINUED | OUTPATIENT
Start: 2021-11-19 | End: 2021-11-21 | Stop reason: HOSPADM

## 2021-11-18 RX ORDER — CEFEPIME HYDROCHLORIDE 2 G/50ML
2000 INJECTION, SOLUTION INTRAVENOUS EVERY 12 HOURS
Status: DISCONTINUED | OUTPATIENT
Start: 2021-11-19 | End: 2021-11-19

## 2021-11-18 RX ORDER — FUROSEMIDE 10 MG/ML
80 INJECTION INTRAMUSCULAR; INTRAVENOUS DAILY
Status: DISCONTINUED | OUTPATIENT
Start: 2021-11-19 | End: 2021-11-19

## 2021-11-18 RX ORDER — ACETAMINOPHEN 325 MG/1
975 TABLET ORAL ONCE
Status: COMPLETED | OUTPATIENT
Start: 2021-11-18 | End: 2021-11-18

## 2021-11-18 RX ORDER — FUROSEMIDE 40 MG/1
80 TABLET ORAL DAILY
Status: DISCONTINUED | OUTPATIENT
Start: 2021-11-19 | End: 2021-11-18

## 2021-11-18 RX ORDER — POTASSIUM CHLORIDE 20 MEQ/1
20 TABLET, EXTENDED RELEASE ORAL 2 TIMES DAILY
Status: DISCONTINUED | OUTPATIENT
Start: 2021-11-18 | End: 2021-11-21 | Stop reason: HOSPADM

## 2021-11-18 RX ORDER — LEVOTHYROXINE SODIUM 0.12 MG/1
125 TABLET ORAL DAILY
Status: DISCONTINUED | OUTPATIENT
Start: 2021-11-19 | End: 2021-11-21 | Stop reason: HOSPADM

## 2021-11-18 RX ADMIN — CEFEPIME HYDROCHLORIDE 2000 MG: 2 INJECTION, SOLUTION INTRAVENOUS at 15:57

## 2021-11-18 RX ADMIN — ACETAMINOPHEN 975 MG: 325 TABLET ORAL at 17:54

## 2021-11-18 RX ADMIN — ENOXAPARIN SODIUM 60 MG: 60 INJECTION SUBCUTANEOUS at 23:16

## 2021-11-18 RX ADMIN — VANCOMYCIN HYDROCHLORIDE 1750 MG: 1 INJECTION, POWDER, LYOPHILIZED, FOR SOLUTION INTRAVENOUS at 13:13

## 2021-11-18 RX ADMIN — POTASSIUM CHLORIDE 20 MEQ: 1500 TABLET, EXTENDED RELEASE ORAL at 23:13

## 2021-11-18 NOTE — ED PROVIDER NOTES
History  Chief Complaint   Patient presents with    Cellulitis     Dr collins doxycycline 100 mg b i d on 11/11  Patient has had no improvement  Affected areas include both legs, stomach and left hip  HPI  This is a 10year-old morbidly obese female presented to ED with complain of worsening cellulitis of her lower extremity extending to her abdomen  Patient states that she was prescribed doxycycline and since November 11th  Visiting nurse are today and recommended to go to the ED since that there was no improvement in her legs or abdomen  Patient states initially started on her lower leg extended to her thigh and her stomach  Patient states the cellulitis is worsen her left hip  She is awake alert x3 GCS 15  Patient is not afebrile with the temp of 100 5°  Rest of the vitals are within normal limits  Prior to Admission Medications   Prescriptions Last Dose Informant Patient Reported? Taking?    DULoxetine (CYMBALTA) 20 mg capsule  Self No No   Sig: Take 1 capsule (20 mg total) by mouth daily   acetaminophen (TYLENOL) 650 mg CR tablet  Self Yes No   Sig: Take 650 mg by mouth every 8 (eight) hours   allopurinol (ZYLOPRIM) 100 mg tablet  Self No No   Sig: Take 1 tablet (100 mg total) by mouth daily   famotidine (PEPCID) 20 mg tablet  Self Yes No   Sig: Take 20 mg by mouth 2 (two) times a day   Patient not taking: Reported on 8/17/2021   furosemide (LASIX) 40 mg tablet  Self No No   Sig: Take 1 tablet (40 mg total) by mouth daily   Patient taking differently: Take 60 mg by mouth daily    furosemide (LASIX) 80 mg tablet   Yes No   levothyroxine 88 mcg tablet  Self No No   Sig: Take 1 tablet (88 mcg total) by mouth daily   Patient taking differently: Take 125 mcg by mouth daily    warfarin (COUMADIN) 2 5 mg tablet  Self No No   Sig: Take 1 tablet (2 5 mg total) by mouth daily   Patient taking differently: Take 2 5 mg by mouth every other day Monday, Wednesday, Friday, sunday   warfarin (COUMADIN) 4 mg tablet Self No No   Sig: Take 1 tablet daily as directed   Patient taking differently: 5 mg every other day Tuesday, Thursday, saturday      Facility-Administered Medications: None       Past Medical History:   Diagnosis Date    Atrial fibrillation ([place] [address] )     Bladder stone     C  difficile colitis     Cellulitis     CHF (congestive heart failure) ([place] [address] )     Depression with anxiety     Disease of thyroid gland     Diverticulitis     Enterocolitis     History of abnormal cervical Pap smear     Kidney stone     Lymphedema     Menopause     Age 53    Morbid obesity with BMI of 70 and over, adult ([place] [address] )     MRSA (methicillin resistant Staphylococcus aureus)     abdominal wound    Osteoarthritis     Phlebitis     left lower leg    Spondylolysis        Past Surgical History:   Procedure Laterality Date    APPENDECTOMY      CARPAL TUNNEL RELEASE      CHOLECYSTECTOMY      CYSTOSCOPY      stent    FOOT SURGERY  1982    bone spur    KNEE SURGERY      WISDOM TOOTH EXTRACTION         Family History   Problem Relation Age of Onset    Heart failure Mother     Heart disease Mother     Lymphoma Mother     Kidney disease Father     Heart disease Father     Heart failure Father     Diabetes type II Father     Diabetes type II Sister     Diabetes type II Brother     Uterine cancer Paternal Aunt     Breast cancer Neg Hx     Colon cancer Neg Hx     Ovarian cancer Neg Hx      I have reviewed and agree with the history as documented      E-Cigarette/Vaping    E-Cigarette Use Never User      E-Cigarette/Vaping Substances    Nicotine No     THC No     CBD No     Flavoring No     Other No     Unknown No      Social History     Tobacco Use    Smoking status: Former Smoker    Smokeless tobacco: Never Used    Tobacco comment: 5 packs/day until 6 (age 16-19) - As per [employer]    Vaping Use    Vaping Use: Never used   Substance Use Topics    Alcohol use: Never    Drug use: Never     Comment: As a teen - As per [employer]        Review of Systems   Constitutional: Positive for fatigue and fever  HENT: Negative  Eyes: Negative  Respiratory: Negative  Cardiovascular: Negative  Gastrointestinal: Negative  Endocrine: Negative  Genitourinary: Negative  Musculoskeletal: Negative  Skin: Positive for wound  Cellulitis  erythema extending from lower extremity to upper bilateral legs worse on left hip  Erythema extending to her lower abdomen   Allergic/Immunologic: Negative  Neurological: Negative  Psychiatric/Behavioral: Negative  Physical Exam  Physical Exam  Vitals and nursing note reviewed  Constitutional:       General: She is not in acute distress  Appearance: She is obese  HENT:      Head: Normocephalic and atraumatic  Nose: Nose normal       Mouth/Throat:      Mouth: Mucous membranes are moist    Eyes:      Extraocular Movements: Extraocular movements intact  Conjunctiva/sclera: Conjunctivae normal       Pupils: Pupils are equal, round, and reactive to light  Cardiovascular:      Rate and Rhythm: Normal rate and regular rhythm  Pulmonary:      Effort: Pulmonary effort is normal       Breath sounds: Normal breath sounds  Abdominal:      General: Bowel sounds are normal       Palpations: Abdomen is soft  Tenderness: There is no abdominal tenderness  Musculoskeletal:      Cervical back: Normal range of motion and neck supple  Skin:     Findings: Erythema (bilateral:  Erythema extending to upper thigh and lower abdomen worse on left hip  No vesicle blister is noted  There is some drainage noted from on the left hip ) present  Neurological:      General: No focal deficit present  Mental Status: She is alert and oriented to person, place, and time     Psychiatric:         Mood and Affect: Mood normal          Behavior: Behavior normal          Vital Signs  ED Triage Vitals   Temperature Pulse Respirations Blood Pressure SpO2   11/18/21 1132 11/18/21 1132 11/18/21 1132 11/18/21 1132 11/18/21 1132   100 5 °F (38 1 °C) 87 22 104/63 96 %      Temp src Heart Rate Source Patient Position - Orthostatic VS BP Location FiO2 (%)   -- 11/18/21 1132 11/18/21 1132 11/18/21 1132 --    Monitor Lying Left arm       Pain Score       11/18/21 1206       9           Vitals:    11/18/21 1132   BP: 104/63   Pulse: 87   Patient Position - Orthostatic VS: Lying         Visual Acuity      ED Medications  Medications   cefepime (MAXIPIME) IVPB (premix in dextrose) 2,000 mg 50 mL (has no administration in time range)   vancomycin (VANCOCIN) 1,750 mg in sodium chloride 0 9 % 500 mL IVPB (1,750 mg Intravenous New Bag 11/18/21 1313)       Diagnostic Studies  Results Reviewed     Procedure Component Value Units Date/Time    Basic metabolic panel [[id number]]  (Abnormal) Collected: 11/18/21 1239    Lab Status: Final result Specimen: Blood from Arm, Left Updated: 11/18/21 1306     Sodium 134 mmol/L      Potassium 3 3 mmol/L      Chloride 93 mmol/L      CO2 33 mmol/L      ANION GAP 8 mmol/L      BUN 29 mg/dL      Creatinine 1 01 mg/dL      Glucose 159 mg/dL      Calcium 9 1 mg/dL      eGFR 57 ml/min/1 73sq m     Narrative:      [place] guidelines for Chronic Kidney Disease (CKD):     Stage 1 with normal or high GFR (GFR > 90 mL/min/1 73 square meters)    Stage 2 Mild CKD (GFR = 60-89 mL/min/1 73 square meters)    Stage 3A Moderate CKD (GFR = 45-59 mL/min/1 73 square meters)    Stage 3B Moderate CKD (GFR = 30-44 mL/min/1 73 square meters)    Stage 4 Severe CKD (GFR = 15-29 mL/min/1 73 square meters)    Stage 5 End Stage CKD (GFR <15 mL/min/1 73 square meters)  Note: GFR calculation is accurate only with a steady state creatinine    Lactic acid [[id number]]  (Normal) Collected: 11/18/21 1239    Lab Status: Final result Specimen: Blood from Arm, Left Updated: 11/18/21 1302     LACTIC ACID 2 0 mmol/L     Narrative:      Result may be elevated if tourniquet was used during collection  Manual Differential (Non Wam) [[id number]]  (Abnormal) Collected: 11/18/21 1239    Lab Status: Final result Specimen: Blood from Arm, Left Updated: 11/18/21 1255     Segmented % 92 %      Bands % 4 %      Lymphocytes % 3 %      Monocytes % 1 %      Neutrophils Absolute 24 58 Thousand/uL      Lymphocytes Absolute 0 77 Thousand/uL      Monocytes Absolute 0 26 Thousand/uL      Total Counted 100     RBC Morphology Normal     Platelet Estimate Increased    CBC and differential [[id number]]  (Abnormal) Collected: 11/18/21 1239    Lab Status: Final result Specimen: Blood from Arm, Left Updated: 11/18/21 1255     WBC 25 60 Thousand/uL      RBC 4 29 Million/uL      Hemoglobin 11 3 g/dL      Hematocrit 35 8 %      MCV 83 fL      MCH 26 3 pg      MCHC 31 5 g/dL      RDW 16 6 %      MPV 7 1 fL      Platelets 637 Thousands/uL     Blood culture #1 [[id number]] Collected: 11/18/21 1239    Lab Status: In process Specimen: Blood from Arm, Left Updated: 11/18/21 1243    Blood culture #2 [[id number]] Collected: 11/18/21 1241    Lab Status:  In process Specimen: Blood from Arm, Left Updated: 11/18/21 1243                 No orders to display              Procedures  Procedures         ED Course  ED Course as of 11/18/21 1529   Thu Nov 18, 2021   1415 WBC(!): 25 60                                                              MDM    Disposition  Final diagnoses:   Bilateral cellulitis of lower leg   Sepsis ([address] )     Time reflects when diagnosis was documented in both MDM as applicable and the Disposition within this note     Time User Action Codes Description Comment    11/18/2021  3:01 PM Alyssa Alonso Add [T62 089,  C15 635] Bilateral cellulitis of lower leg     11/18/2021  3:01 PM Alyssa Alonso Add [A41 9] Sepsis [facility])       ED Disposition     ED Disposition Condition Date/Time Comment    Transfer to Another Facility-In Network  Thu Nov 18, 2021  3:01 PM Joyce Mealing should be transferred out to [address] Reyna Mancia MD Documentation      Most Recent Value   Patient Condition The patient has been stabilized such that within reasonable medical probability, no material deterioration of the patient condition or the condition of the unborn child(kevin) is likely to result from the transfer   Reason for Transfer Other (Include comment)____________________   Benefits of Transfer Other benefits (Include comment)_______________________   Risks of Transfer Potential for delay in receiving treatment, Potential deterioration of medical condition, Loss of IV, Increased discomfort during transfer, Possible worsening of condition or death during transfer   Accepting Physician Melia Mukherjee      Follow-up Information    None         Patient's Medications   Discharge Prescriptions    No medications on file       No discharge procedures on file      PDMP Review     None          ED Provider  Electronically Signed by           Bari Duarte MD  11/18/21 5245

## 2021-11-18 NOTE — EMTALA/ACUTE CARE TRANSFER
[facility]  [address] [address]  [phone]  Dept: [phone]      [address] TRANSFER CONSENT    NAME Shane WILD 1950                              MRN [id number]    I have been informed of my rights regarding examination, treatment, and transfer   by Dr Joycelyn Hollingsworth MD    Benefits:      Risks:        Consent for Transfer:  I acknowledge that my medical condition has been evaluated and explained to me by the emergency department physician or other qualified medical person and/or my attending physician, who has recommended that I be transferred to the service of    at    The above potential benefits of such transfer, the potential risks associated with such transfer, and the probable risks of not being transferred have been explained to me, and I fully understand them  The doctor has explained that, in my case, the benefits of transfer outweigh the risks  I agree to be transferred  I authorize the performance of emergency medical procedures and treatments upon me in both transit and upon arrival at the receiving facility  Additionally, I authorize the release of any and all medical records to the receiving facility and request they be transported with me, if possible  I understand that the safest mode of transportation during a medical emergency is an ambulance and that the Hospital advocates the use of this mode of transport  Risks of traveling to the receiving facility by car, including absence of medical control, life sustaining equipment, such as oxygen, and medical personnel has been explained to me and I fully understand them  (JOANN CORRECT BOX BELOW)  [  ]  I consent to the stated transfer and to be transported by ambulance/helicopter  [  ]  I consent to the stated transfer, but refuse transportation by ambulance and accept full responsibility for my transportation by car    I understand the risks of non-ambulance transfers and I exonerate the Hospital and its staff from any deterioration in my condition that results from this refusal     X___________________________________________    DATE  21  TIME________  Signature of patient or legally responsible individual signing on patient behalf           RELATIONSHIP TO PATIENT_________________________          Provider Certification    NAME Leeann Peralta                                         1950                              MRN [id number]    A medical screening exam was performed on the above named patient  Based on the examination:    Condition Necessitating Transfer The primary encounter diagnosis was Bilateral cellulitis of lower leg  A diagnosis of Sepsis ([facility] [address] ) was also pertinent to this visit  Patient Condition:      Reason for Transfer:      Transfer Requirements: Facility     · Space available and qualified personnel available for treatment as acknowledged by    · Agreed to accept transfer and to provide appropriate medical treatment as acknowledged by          · Appropriate medical records of the examination and treatment of the patient are provided at the time of transfer   [address], [address] _______  · Transfer will be performed by qualified personnel from    and appropriate transfer equipment as required, including the use of necessary and appropriate life support measures      Provider Certification: I have examined the patient and explained the following risks and benefits of being transferred/refusing transfer to the patient/family:         Based on these reasonable risks and benefits to the patient and/or the unborn child(kevin), and based upon the information available at the time of the patients examination, I certify that the medical benefits reasonably to be expected from the provision of appropriate medical treatments at another medical facility outweigh the increasing risks, if any, to the individuals medical condition, and in the case of labor to the unborn child, from effecting the transfer      X____________________________________________ DATE 11/18/21        TIME_______      ORIGINAL - SEND TO MEDICAL RECORDS   COPY - SEND WITH PATIENT DURING TRANSFER

## 2021-11-19 LAB
ANION GAP SERPL CALCULATED.3IONS-SCNC: 2 MMOL/L (ref 5–14)
BUN SERPL-MCNC: 31 MG/DL (ref 5–25)
CALCIUM SERPL-MCNC: 8.6 MG/DL (ref 8.4–10.2)
CHLORIDE SERPL-SCNC: 98 MMOL/L (ref 97–108)
CO2 SERPL-SCNC: 36 MMOL/L (ref 22–30)
CREAT SERPL-MCNC: 1.18 MG/DL (ref 0.6–1.2)
ERYTHROCYTE [DISTWIDTH] IN BLOOD BY AUTOMATED COUNT: 17 %
GFR SERPL CREATININE-BSD FRML MDRD: 47 ML/MIN/1.73SQ M
GLUCOSE SERPL-MCNC: 139 MG/DL (ref 70–99)
HCT VFR BLD AUTO: 32.9 % (ref 36–46)
HGB BLD-MCNC: 10.3 G/DL (ref 12–16)
MCH RBC QN AUTO: 26.1 PG (ref 26–34)
MCHC RBC AUTO-ENTMCNC: 31.2 G/DL (ref 31–36)
MCV RBC AUTO: 84 FL (ref 80–100)
PLATELET # BLD AUTO: 534 THOUSANDS/UL (ref 150–450)
PMV BLD AUTO: 7.2 FL (ref 8.9–12.7)
POTASSIUM SERPL-SCNC: 3.7 MMOL/L (ref 3.6–5)
RBC # BLD AUTO: 3.93 MILLION/UL (ref 4–5.2)
SODIUM SERPL-SCNC: 136 MMOL/L (ref 137–147)
WBC # BLD AUTO: 20.1 THOUSAND/UL (ref 4.5–11)

## 2021-11-19 PROCEDURE — 85027 COMPLETE CBC AUTOMATED: CPT | Performed by: PHYSICIAN ASSISTANT

## 2021-11-19 PROCEDURE — 99232 SBSQ HOSP IP/OBS MODERATE 35: CPT | Performed by: INTERNAL MEDICINE

## 2021-11-19 PROCEDURE — 99223 1ST HOSP IP/OBS HIGH 75: CPT | Performed by: STUDENT IN AN ORGANIZED HEALTH CARE EDUCATION/TRAINING PROGRAM

## 2021-11-19 PROCEDURE — 80048 BASIC METABOLIC PNL TOTAL CA: CPT | Performed by: PHYSICIAN ASSISTANT

## 2021-11-19 RX ORDER — FUROSEMIDE 10 MG/ML
40 INJECTION INTRAMUSCULAR; INTRAVENOUS
Status: DISCONTINUED | OUTPATIENT
Start: 2021-11-19 | End: 2021-11-21 | Stop reason: HOSPADM

## 2021-11-19 RX ADMIN — ALLOPURINOL 100 MG: 100 TABLET ORAL at 08:28

## 2021-11-19 RX ADMIN — POTASSIUM CHLORIDE 20 MEQ: 1500 TABLET, EXTENDED RELEASE ORAL at 08:28

## 2021-11-19 RX ADMIN — FUROSEMIDE 40 MG: 10 INJECTION, SOLUTION INTRAVENOUS at 08:27

## 2021-11-19 RX ADMIN — LEVOTHYROXINE SODIUM 125 MCG: 125 TABLET ORAL at 05:04

## 2021-11-19 RX ADMIN — WARFARIN SODIUM 2 MG: 2 TABLET ORAL at 17:14

## 2021-11-19 RX ADMIN — POTASSIUM CHLORIDE 20 MEQ: 1500 TABLET, EXTENDED RELEASE ORAL at 17:14

## 2021-11-19 RX ADMIN — CEFEPIME HYDROCHLORIDE 2000 MG: 2 INJECTION, SOLUTION INTRAVENOUS at 04:29

## 2021-11-19 RX ADMIN — VANCOMYCIN HYDROCHLORIDE 1750 MG: 1 INJECTION, POWDER, LYOPHILIZED, FOR SOLUTION INTRAVENOUS at 13:36

## 2021-11-19 RX ADMIN — VANCOMYCIN HYDROCHLORIDE 1750 MG: 1 INJECTION, POWDER, LYOPHILIZED, FOR SOLUTION INTRAVENOUS at 01:37

## 2021-11-19 RX ADMIN — DULOXETINE 20 MG: 20 CAPSULE, DELAYED RELEASE ORAL at 08:28

## 2021-11-19 NOTE — ASSESSMENT & PLAN NOTE
· Continue IV Lasix 40 b i d , patient takes 80mg PO and 20mg PRN 10 pound weight gain  · Consider outpatient referral to lymphedema clinic

## 2021-11-19 NOTE — PLAN OF CARE
Problem: MOBILITY - ADULT  Goal: Maintain or return to baseline ADL function  Description: INTERVENTIONS:  -  Assess patient's ability to carry out ADLs; assess patient's baseline for ADL function and identify physical deficits which impact ability to perform ADLs (bathing, care of mouth/teeth, toileting, grooming, dressing, etc )  - Assess/evaluate cause of self-care deficits   - Assess range of motion  - Assess patient's mobility; develop plan if impaired  - Assess patient's need for assistive devices and provide as appropriate  - Encourage maximum independence but intervene and supervise when necessary  - Involve family in performance of ADLs  - Assess for home care needs following discharge   - Consider OT consult to assist with ADL evaluation and planning for discharge  - Provide patient education as appropriate  Outcome: Progressing  Goal: Maintains/Returns to pre admission functional level  Description: INTERVENTIONS:  - Perform BMAT or MOVE assessment daily    - Set and communicate daily mobility goal to care team and patient/family/caregiver     - Collaborate with rehabilitation services on mobility goals if consulted  -  - Out of bed for toileting  - Record patient progress and toleration of activity level   Outcome: Progressing     Problem: NEUROSENSORY - ADULT  Goal: Achieves stable or improved neurological status  Description: INTERVENTIONS  - Monitor and report changes in neurological status  - Monitor vital signs such as temperature, blood pressure, glucose, and any other labs ordered   - Initiate measures to prevent increased intracranial pressure  - Monitor for seizure activity and implement precautions if appropriate      Outcome: Progressing     Problem: CARDIOVASCULAR - ADULT  Goal: Maintains optimal cardiac output and hemodynamic stability  Description: INTERVENTIONS:  - Monitor I/O, vital signs and rhythm  - Monitor for S/S and trends of decreased cardiac output  - Administer and titrate ordered vasoactive medications to optimize hemodynamic stability  - Assess quality of pulses, skin color and temperature  - Assess for signs of decreased coronary artery perfusion  - Instruct patient to report change in severity of symptoms  Outcome: Progressing     Problem: RESPIRATORY - ADULT  Goal: Achieves optimal ventilation and oxygenation  Description: INTERVENTIONS:  - Assess for changes in respiratory status  - Assess for changes in mentation and behavior  - Position to facilitate oxygenation and minimize respiratory effort  - Oxygen administered by appropriate delivery if ordered  - Initiate smoking cessation education as indicated  - Encourage broncho-pulmonary hygiene including cough, deep breathe, Incentive Spirometry  - Assess the need for suctioning and aspirate as needed  - Assess and instruct to report SOB or any respiratory difficulty  - Respiratory Therapy support as indicated  Outcome: Progressing     Problem: GASTROINTESTINAL - ADULT  Goal: Minimal or absence of nausea and/or vomiting  Description: INTERVENTIONS:  - Administer IV fluids if ordered to ensure adequate hydration  - Maintain NPO status until nausea and vomiting are resolved  - Nasogastric tube if ordered  - Administer ordered antiemetic medications as needed  - Provide nonpharmacologic comfort measures as appropriate  - Advance diet as tolerated, if ordered  - Consider nutrition services referral to assist patient with adequate nutrition and appropriate food choices  Outcome: Progressing  Goal: Maintains or returns to baseline bowel function  Description: INTERVENTIONS:  - Assess bowel function  - Encourage oral fluids to ensure adequate hydration  - Administer IV fluids if ordered to ensure adequate hydration  - Administer ordered medications as needed  - Encourage mobilization and activity  - Consider nutritional services referral to assist patient with adequate nutrition and appropriate food choices  Outcome: Progressing Problem: GENITOURINARY - ADULT  Goal: Maintains or returns to baseline urinary function  Description: INTERVENTIONS:  - Assess urinary function  - Encourage oral fluids to ensure adequate hydration if ordered  - Administer IV fluids as ordered to ensure adequate hydration  - Administer ordered medications as needed  - Offer frequent toileting  - Follow urinary retention protocol if ordered  Outcome: Progressing  Goal: Absence of urinary retention  Description: INTERVENTIONS:  - Assess patients ability to void and empty bladder  - Monitor I/O  - Bladder scan as needed  - Discuss with physician/AP medications to alleviate retention as needed  - Discuss catheterization for long term situations as appropriate  Outcome: Progressing     Problem: METABOLIC, FLUID AND ELECTROLYTES - ADULT  Goal: Electrolytes maintained within normal limits  Description: INTERVENTIONS:  - Monitor labs and assess patient for signs and symptoms of electrolyte imbalances  - Administer electrolyte replacement as ordered  - Monitor response to electrolyte replacements, including repeat lab results as appropriate  - Instruct patient on fluid and nutrition as appropriate  Outcome: Progressing  Goal: Fluid balance maintained  Description: INTERVENTIONS:  - Monitor labs   - Monitor I/O and WT  - Instruct patient on fluid and nutrition as appropriate  - Assess for signs & symptoms of volume excess or deficit  Outcome: Progressing     Problem: SKIN/TISSUE INTEGRITY - ADULT  Goal: Skin Integrity remains intact(Skin Breakdown Prevention)  Description: Assess:  -Perform Gucci assessment   -Assess extremities for adequate circulation and sensation     Bed Management:  -Have minimal linens on bed & keep smooth, unwrinkled  -Change linens as needed when moist or perspiring      Toileting:  -Offer bedside commode  -Assess for incontinence     Activity:      Skin Care:  -Avoid use of baby powder, tape, friction and shearing, hot water or constrictive clothing  -Relieve pressure over bony prominences   -Do not massage red bony areas    Next Steps:    Outcome: Progressing  Goal: Incision(s), wounds(s) or drain site(s) healing without S/S of infection  Description: INTERVENTIONS  - Assess and document dressing, incision, wound bed, drain sites and surrounding tissue  - Provide patient and family education  - Perform skin care/dressing c  Outcome: Progressing     Problem: HEMATOLOGIC - ADULT  Goal: Maintains hematologic stability  Description: INTERVENTIONS  - Assess for signs and symptoms of bleeding or hemorrhage  - Monitor labs  - Administer supportive blood products/factors as ordered and appropriate  Outcome: Progressing     Problem: MUSCULOSKELETAL - ADULT  Goal: Maintain or return mobility to safest level of function  Description: INTERVENTIONS:  - Assess patient's ability to carry out ADLs; assess patient's baseline for ADL function and identify physical deficits which impact ability to perform ADLs (bathing, care of mouth/teeth, toileting, grooming, dressing, etc )  - Assess/evaluate cause of self-care deficits   - Assess range of motion  - Assess patient's mobility  - Assess patient's need for assistive devices and provide as appropriate  - Encourage maximum independence but intervene and supervise when necessary  - Involve family in performance of ADLs  - Assess for home care needs following discharge   - Consider OT consult to assist with ADL evaluation and planning for discharge  - Provide patient education as appropriate  Outcome: Progressing  Goal: Maintain proper alignment of affected body part  Description: INTERVENTIONS:  - Support, maintain and protect limb and body alignment  - Provide patient/ family with appropriate education  Outcome: Progressing

## 2021-11-19 NOTE — PROGRESS NOTES
[facility]  Progress Note - Royce Egan 1950, 80 y o  female MRN: [id number]  Unit/Bed#: 7T [facility] 702-01 Encounter: [id number]  Primary Care Provider: ORACIO Hardy   Date and time admitted to hospital: 11/18/2021  8:12 PM    * Sepsis [facility])  Assessment & Plan  · Secondary to cellulitis noted by fever tachycardia and tachypnea, leukocytosis  · Patient is afebrile, leukocytosis improved  · S/p Cefepime 2 g and vancomycin 1750 mg - continue for now  · Consult ID - input appreciated  · Caution with fluid secondary to diastolic CHF and significant edema LE/abdomen  · Lactic acid 2 0  · Follow blood culture    Cellulitis  Assessment & Plan  · Bilateral upper thighs, lower abdomen, and on the left hip  · Failed outpatient antibiotics with doxycycline started on 11/11  · History of MRSA with panniculitis and previously saw surgery as an outpatient  · ID consult - input appreciated    Hypokalemia  Assessment & Plan  · Replete p r n      Lymphedema of extremity  Assessment & Plan  · Continue IV Lasix 40 b i d , patient takes 80mg PO and 20mg PRN 10 pound weight gain  · Consider outpatient referral to lymphedema clinic    Chronic diastolic congestive heart failure (HCC)  Assessment & Plan  Wt Readings from Last 3 Encounters:   11/18/21 (!) 172 kg (380 lb)   10/03/20 (!) 210 kg (462 lb 15 5 oz)   09/28/20 (!) 191 kg (422 lb)     · Continue IV Lasix 40 b i d         Morbid obesity with BMI of 60 0-69 9, adult (HCC)  Assessment & Plan  · Healthy diet recommended  · Consider referral to dietitian as an outpatient of the bariatric center    Idiopathic chronic 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  Assessment & Plan  · Continue allopurinol    Mild episode of recurrent major depressive disorder ([facility] [address] )  Assessment & Plan  · Continue cymbalta    Other specified hypothyroidism  Assessment & Plan  · Continue levothyroxine    Paroxysmal atrial fibrillation (HCC)  Assessment & Plan  · Heart rate is well controlled  · Continue home dose warfarin  · INR is therapeutic      VTE Pharmacologic Prophylaxis:   Pharmacologic: Warfarin (Coumadin)  Mechanical VTE Prophylaxis in Place: Yes    Patient Centered Rounds: I have performed bedside rounds with nursing staff today  Discussions with Specialists or Other Care Team Provider:  Discussed with Case Management, nurse    Education and Discussions with Family / Patient:  Discussed with patient    Time Spent for Care: 45 minutes  More than 50% of total time spent on counseling and coordination of care as described above  Current Length of Stay: 1 day(s)    Current Patient Status: Inpatient   Certification Statement: The patient will continue to require additional inpatient hospital stay due to Sepsis secondary to cellulitis    Discharge Plan / Estimated Discharge Date:  Pending      Code Status: Level 1 - Full Code      Subjective:   Patient was seen and examined at bedside  The patient said she has chronic pain but denies any chest pain, palpitation, shortness of breath, N/V, abd pain  Patient said she tried doxycycline as an outpatient with no clinically improvement  Patient has home health 20hrs/day under waiver program     Objective:     Vitals:   Temp (24hrs), Av 9 °F (37 7 °C), Min:98 6 °F (37 °C), Max:101 °F (38 3 °C)    Temp:  [98 6 °F (37 °C)-101 °F (38 3 °C)] 98 9 °F (37 2 °C)  HR:  [68-90] 68  Resp:  [19-22] 19  BP: ()/(53-65) 100/57  SpO2:  [96 %-99 %] 99 %  Body mass index is 67 31 kg/m²       Input and Output Summary (last 24 hours):     No intake or output data in the 24 hours ending 21 0735    Physical Exam:     Physical Exam  General: Elderly female patient lying in bed, breathing well on room air, no acute distress, patient is bed-bound  HEENT: NC/AT, PERRL, EOM - normal  Neck: Supple  Pulm/Chest: Normal chest wall expansion, clear breath sounds on both side, no wheezing/rhonchi or crackles appreciated  CVS: RRR, normal S1&S2, no murmur appreciated, capillary refill <2s  Abd: soft, non tender, non distended, bowel sounds +  MSK: move all 4 extremities spontaneously, areas of erythema/warmth over abdomen, bilateral lower extremities, panniculitis noted  Skin: warm  CNS: no acute focal neuro deficit      Additional Data:     Labs:    Results from last 7 days   Lab Units 11/19/21  0436 11/18/21  1239 11/18/21  1239   WBC Thousand/uL 20 10*   < > 25 60*   HEMOGLOBIN g/dL 10 3*   < > 11 3*   HEMATOCRIT % 32 9*   < > 35 8*   PLATELETS Thousands/uL 534*   < > 632*   LYMPHO PCT %  --   --  3*   MONO PCT %  --   --  1*    < > = values in this interval not displayed  Results from last 7 days   Lab Units 11/19/21  0436   POTASSIUM mmol/L 3 7   CHLORIDE mmol/L 98   CO2 mmol/L 36*   BUN mg/dL 31*   CREATININE mg/dL 1 18   CALCIUM mg/dL 8 6     Results from last 7 days   Lab Units 11/18/21  2230   INR  2 35*       * I Have Reviewed All Lab Data Listed Above  * Additional Pertinent Lab Tests Reviewed: [address] Admission Reviewed    Imaging:    Imaging Reports Reviewed Today Include: None  Imaging Personally Reviewed by Myself Includes:  None      Recent Cultures (last 7 days):     Results from last 7 days   Lab Units 11/18/21  1241 11/18/21  1239   BLOOD CULTURE  Received in Microbiology Lab  Culture in Progress  Received in Microbiology Lab  Culture in Progress         Last 24 Hours Medication List:   Current Facility-Administered Medications   Medication Dose Route Frequency Provider Last Rate    acetaminophen  650 mg Oral Q4H PRN Cecilia Lees PA-C      allopurinol  100 mg Oral Daily [place], [place]      cefepime  2,000 mg Intravenous Q12H [place] MYA Sweet 2,000 mg (11/19/21 0429)    DULoxetine  20 mg Oral Daily [place], [place]      furosemide  40 mg Intravenous BID (diuretic) Ismael Waggoner MD      levothyroxine  125 mcg Oral Daily [facility] MYA Dey      ondansetron  4 mg Intravenous Q6H PRN Luis Angel Luong PA-C      potassium chloride  20 mEq Oral BID [place], [place]      vancomycin  17 5 mg/kg (Adjusted) Intravenous Q12H Trena Khan MD 1,750 mg (11/19/21 0137)    warfarin  2 mg Oral Once per day on Sun Mon Wed Fri Luis Angel Luong PA-C      [START ON 11/20/2021] warfarin  4 mg Oral Once per day on Tue Thu Sat Luis Angel Luong PA-C          Today, Patient Was Seen By: Trena Khan MD    ** Please Note: Dragon 360 Dictation voice to text software may have been used in the creation of this document   **

## 2021-11-19 NOTE — ASSESSMENT & PLAN NOTE
· Secondary to cellulitis noted by fever tachycardia and tachypnea, leukocytosis  · ER treatment with cefepime 2 g and vancomycin 1750 mg  · Caution with fluid secondary to diastolic CHF and significant edema LE/abdomen  · Lactic acid 2 0

## 2021-11-19 NOTE — ASSESSMENT & PLAN NOTE
Wt Readings from Last 3 Encounters:   11/18/21 (!) 172 kg (380 lb)   10/03/20 (!) 210 kg (462 lb 15 5 oz)   09/28/20 (!) 191 kg (422 lb)     · Continue IV Lasix 40 b i d

## 2021-11-19 NOTE — CONSULTS
Consultation - Infectious Disease   Jayjay Brennan 80 y o  female MRN: [id number]  Unit/Bed#: 7T [facility] 702-01 Encounter: [id number]      IMPRESSION & RECOMMENDATIONS:     1  Sepsis-POA  Characterized by fever, leukocytosis  Due to #2 and #3 below  She has a history of recurrent cellulitis due to lymphedema and morbid obesity  Blood pressure is stable  Fever and leukocytosis improving on IV antibiotics  -monitor fever curve, WBC count, vitals   -follow up blood cultures   -continue vancomycin  Dosing by pharmacy   -stop cefepime as cellulitis most likely due to staph (including MRSA) or strep (skin aristeo)    2  Panniculitis  Patient with lymphedema of her pannus and history of MRSA panniculitis  She has evidence of infection of the pannus with spread across her abdomen     -antibiotic plan as above   -monitor clinical exam    3  Left thigh cellulitis   The patient has lymphedema of the hips/thighs, so difficult to assess if her symptoms are due to edema vs infection  Suspect the majority of the leg findings are due to edema as bilateral cellulitis is rare, however there is more erythema and pain of the left hip so suspect there is cellulitis in this area     -antibiotics as above   -monitor exam    4  Lymphedema  Risk factor for recurrent infection  -recommend referral to lymphedema clinic after discharge   -diuresis per primary team    5  Morbid obesity   BMI 67 31  She has been referred to plastic surgery for panniculectomy and bariatric surgery but has not made these appointments yet  Without weight loss, she is at risk for recurrent infection  -recommend referral to plastics and bariatric surgery at discharge     6  Chronic diastolic heart failure  Continue diuresis  I have discussed the above management plan in detail with the primary service  ID will see again 11/22, but please call with questions       I have performed an extensive review of the medical records in Epic including review of the notes, radiographs, and laboratory results     HISTORY OF PRESENT ILLNESS:  Reason for Consult: cellulitis, panniculitis   HPI: Miki Garza is a 80 y o  woman with a history of morbid obesity, diastolic heart failure, C Dif colitis, history of panniculitis with MRSA and chronic lymphedema who presented to [facility] yesterday with worsening cellulitis of her upper legs and pannus  The patient was prescribed doxycycline 100mg PO BID 11/11 by her primary provider but had little improvement in her symptoms, so she was advised to go to the ED  She also endorsed fever and chills over this time period  The patient states that the erythema started over her left hip and then extended to her stomach, pannus, and right leg  She was transferred to [address] [place] [address] for admission  On presentation, the patient was febrile to 101 with a WBC count of 25 6  She was started on vancomycin and cefepime  ID is consulted for antibiotic management  Overall, the patient states that she is feeling better since admission with improvement in pain, fever, chills, fatigue  REVIEW OF SYSTEMS:  A complete review of systems is negative other than that noted in the HPI      PAST MEDICAL HISTORY:  Past Medical History:   Diagnosis Date    Atrial fibrillation [facility])     Bladder stone     C  difficile colitis     Cellulitis     [facility]     Depression with anxiety     Disease of thyroid gland     Diverticulitis     Enterocolitis     History of abnormal cervical Pap smear     Kidney stone     Lymphedema     Menopause     Age 53    Morbid obesity with BMI of 70 and over, adult ([facility] )     MRSA (methicillin resistant Staphylococcus aureus)     abdominal wound    Osteoarthritis     Phlebitis     left lower leg    Spondylolysis      Past Surgical History:   Procedure Laterality Date    APPENDECTOMY      CARPAL TUNNEL RELEASE      CHOLECYSTECTOMY      CYSTOSCOPY      stent    FOOT SURGERY 1982    bone spur    KNEE SURGERY      WISDOM TOOTH EXTRACTION         FAMILY HISTORY:  Non-contributory    SOCIAL HISTORY:  Social History   Social History     Substance and Sexual Activity   Alcohol Use Never     Social History     Substance and Sexual Activity   Drug Use Never    Comment: As a teen - As per [employer]      Social History     Tobacco Use   Smoking Status Former Smoker   Smokeless Tobacco Never Used   Tobacco Comment    5 packs/day until 6 (age [age]) - As per [employer]        ALLERGIES:  Allergies   Allergen Reactions    Augmentin Es-600  [Amoxicillin-Pot Clavulanate]     Penicillins Other (See Comments)     Tolerates cefepime (21)    Sulfa Antibiotics Hives    Vitamin C [Ascorbate - Food Allergy]     Latex Rash and Edema       MEDICATIONS:  All current active medications have been reviewed  PHYSICAL EXAM:  Temp:  [97 2 °F (36 2 °C)-101 °F (38 3 °C)] 98 3 °F (36 8 °C)  HR:  [68-90] 74  Resp:  [18-21] 18  BP: ()/(53-78) 102/58  SpO2:  [92 %-99 %] 95 %  Temp (24hrs), Av 8 °F (37 1 °C), Min:97 2 °F (36 2 °C), Max:101 °F (38 3 °C)  Current: Temperature: 98 3 °F (36 8 °C)    Intake/Output Summary (Last 24 hours) at 2021 1613  Last data filed at 2021 1418  Gross per 24 hour   Intake 480 ml   Output 250 ml   Net 230 ml       General Appearance:  Appearing well, nontoxic, and in no distress   Obese   Head:  Normocephalic, without obvious abnormality, atraumatic   Eyes:  Conjunctiva pink and sclera anicteric, both eyes   Nose: Nares normal, mucosa normal, no drainage   Throat: Oropharynx moist without lesions   Neck: Supple, symmetrical, no adenopathy, no tenderness/mass/nodules   Back:   Symmetric, no curvature, ROM normal, no CVA tenderness   Lungs:   Clear to auscultation bilaterally, respirations unlabored   Chest Wall:  No tenderness or deformity   Heart:  Regular rate, irregular rhythm   Abdomen:   Soft, non-tender, non-distended, positive bowel sounds Extremities: Bilateral lower extremity swelling and skin thickening lateral thighs, erythema and warmth in this area  Scattered areas of erythema on the legs  Skin: Large pannus with lateral lymphedema, skin thickening and erythema  Erythema spreading across the lower pannus   Lymph nodes: Cervical, supraclavicular nodes normal   Neurologic: Alert and oriented times 3, extremity strength 5/5 and symmetric       LABS, IMAGING, & OTHER STUDIES:  Lab Results:  I have personally reviewed pertinent labs  Results from last 7 days   Lab Units 11/19/21  0436 11/18/21  1239   WBC Thousand/uL 20 10* 25 60*   HEMOGLOBIN g/dL 10 3* 11 3*   PLATELETS Thousands/uL 534* 632*     Results from last 7 days   Lab Units 11/19/21  0436 11/18/21  1239 11/18/21  1239   SODIUM mmol/L 136*  --  134   POTASSIUM mmol/L 3 7   < > 3 3*   CHLORIDE mmol/L 98   < > 93*   CO2 mmol/L 36*   < > 33*   BUN mg/dL 31*   < > 29*   CREATININE mg/dL 1 18   < > 1 01   EGFR ml/min/1 73sq m 47   < > 57   CALCIUM mg/dL 8 6   < > 9 1    < > = values in this interval not displayed  Results from last 7 days   Lab Units 11/18/21  1241 11/18/21  1239   BLOOD CULTURE  Received in Microbiology Lab  Culture in Progress  Received in Microbiology Lab  Culture in Progress  Imaging Studies:   I have personally reviewed pertinent imaging study reports and images in PACS  Other Studies:   I have personally reviewed pertinent reports

## 2021-11-19 NOTE — PROGRESS NOTES
Vancomycin IV Pharmacy-to-Dose Consultation    Elvia Rodriguez is a 80 y o  female who is currently receiving Vancomycin IV with management by the Pharmacy Consult service  Assessment/Plan:  The patient was reviewed  Renal function is stable and no signs or symptoms of nephrotoxicity and/or infusion reactions were documented in the chart  Based on todays assessment, continue current vancomycin (day # 2) dosing of to be determined, with a plan for trough to be drawn at 12:30 on 11/20/21  We will continue to follow the patients culture results and clinical progress daily      Buck Madrid, Pharmacist

## 2021-11-19 NOTE — PLAN OF CARE
Problem: MOBILITY - ADULT  Goal: Maintain or return to baseline ADL function  Description: INTERVENTIONS:  -  Assess patient's ability to carry out ADLs; assess patient's baseline for ADL function and identify physical deficits which impact ability to perform ADLs (bathing, care of mouth/teeth, toileting, grooming, dressing, etc )  - Assess/evaluate cause of self-care deficits   - Assess range of motion  - Assess patient's mobility; develop plan if impaired  - Assess patient's need for assistive devices and provide as appropriate  - Encourage maximum independence but intervene and supervise when necessary  - Involve family in performance of ADLs  - Assess for home care needs following discharge   - Consider OT consult to assist with ADL evaluation and planning for discharge  - Provide patient education as appropriate  Outcome: Progressing  Goal: Maintains/Returns to pre admission functional level  Description: INTERVENTIONS:  - Perform BMAT or MOVE assessment daily    - Set and communicate daily mobility goal to care team and patient/family/caregiver  - Collaborate with rehabilitation services on mobility goals if consulted  - Perform Range of Motion 4 times a day  - Reposition patient every 2 hours    - Dangle patient - pt bed bound  - Stand patient - patient bed bound  - Ambulate patient - patient bed bound  - Out of bed to chair -pt bed bound  - Out of bed for meals - pt bed bound   - Out of bed for toileting - pt bed bound  - Record patient progress and toleration of activity level   Outcome: Progressing     Problem: CARDIOVASCULAR - ADULT  Goal: Maintains optimal cardiac output and hemodynamic stability  Description: INTERVENTIONS:  - Monitor I/O, vital signs and rhythm  - Monitor for S/S and trends of decreased cardiac output  - Administer and titrate ordered vasoactive medications to optimize hemodynamic stability  - Assess quality of pulses, skin color and temperature  - Assess for signs of decreased coronary artery perfusion  - Instruct patient to report change in severity of symptoms  Outcome: Progressing     Problem: SKIN/TISSUE INTEGRITY - ADULT  Goal: Skin Integrity remains intact(Skin Breakdown Prevention)  Description: Assess:  -Perform Gucci assessment every shift  -Clean and moisturize skin BID  -Inspect skin when repositioning, toileting, and assisting with ADLS    -Assess extremities for adequate circulation and sensation     Bed Management:  -Have minimal linens on bed & keep smooth, unwrinkled  -Change linens as needed when moist or perspiring  -Avoid sitting or lying in one position for more than 2 hours while in bed      Toileting:  -Offer bedside commode  -Assess for incontinence every 2 hrs  -Use incontinent care products after each incontinent episode such as barrier cream    Activity:      -Encourage or provide ROM exercises   -Turn and reposition patient every 2 Hours  -Use appropriate equipment to lift or move patient in bed  -Instruct/ Assist with weight shifting every 2 when out of bed in chair      Skin Care:  -Avoid use of baby powder, tape, friction and shearing, hot water or constrictive clothing  -Relieve pressure over bony prominences using pillow support and q2 turning  -Do not massage red bony areas      Outcome: Progressing  Goal: Incision(s), wounds(s) or drain site(s) healing without S/S of infection  Description: INTERVENTIONS  - Assess and document dressing, incision, wound bed, drain sites and surrounding tissue  - Provide patient and family education  - Perform skin care/dressing changes every 8 hrs  Outcome: Progressing     Problem: Prexisting or High Potential for Compromised Skin Integrity  Goal: Skin integrity is maintained or improved  Description: INTERVENTIONS:  - Identify patients at risk for skin breakdown  - Assess and monitor skin integrity  - Assess and monitor nutrition and hydration status  - Monitor labs   - Assess for incontinence   - Turn and reposition patient  - Assist with mobility/ambulation  - Relieve pressure over bony prominences  - Avoid friction and shearing  - Provide appropriate hygiene as needed including keeping skin clean and dry  - Evaluate need for skin moisturizer/barrier cream  - Collaborate with interdisciplinary team   - Patient/family teaching  - Consider wound care consult   Outcome: Progressing

## 2021-11-19 NOTE — ASSESSMENT & PLAN NOTE
· Bilateral upper thighs, lower abdomen, and on the left hip  · Failed outpatient antibiotics with doxycycline started on 11/11  · History of MRSA with panniculitis and previously saw surgery as an outpatient

## 2021-11-19 NOTE — ASSESSMENT & PLAN NOTE
· Healthy diet recommended  · Consider referral to dietitian as an outpatient of the bariatric center

## 2021-11-19 NOTE — ASSESSMENT & PLAN NOTE
Wt Readings from Last 3 Encounters:   11/18/21 (!) 172 kg (380 lb)   10/03/20 (!) 210 kg (462 lb 15 5 oz)   09/28/20 (!) 191 kg (422 lb)     · Continue Lasix

## 2021-11-19 NOTE — ASSESSMENT & PLAN NOTE
· Will give IV Lasix 80mg daily, patient takes 80mg PO and 20mg PRN 10 pound weight gain  · Consider outpatient referral to lymphedema clinic

## 2021-11-19 NOTE — ASSESSMENT & PLAN NOTE
· Secondary to cellulitis noted by fever tachycardia and tachypnea, leukocytosis  · Patient is afebrile, leukocytosis improved  · S/p Cefepime 2 g and vancomycin 1750 mg - continue for now  · Consult ID - input appreciated  · Caution with fluid secondary to diastolic CHF and significant edema LE/abdomen  · Lactic acid 2 0  · Follow blood culture

## 2021-11-19 NOTE — ASSESSMENT & PLAN NOTE
· Bilateral upper thighs, lower abdomen, and on the left hip  · Failed outpatient antibiotics with doxycycline started on 11/11  · History of MRSA with panniculitis and previously saw surgery as an outpatient  · ID consult - input appreciated

## 2021-11-19 NOTE — H&P
[facility]  H&P- Isabella Zabala 1950, 80 y o  female MRN: [id number]  Unit/Bed#: 7T [facility] 702-01 Encounter: [id number]  Primary Care Provider: ORACIO Vazquez   Date and time admitted to hospital: 11/18/2021  8:12 PM    * Sepsis [facility])  Assessment & Plan  · Secondary to cellulitis noted by fever tachycardia and tachypnea, leukocytosis  · ER treatment with cefepime 2 g and vancomycin 1750 mg  · Caution with fluid secondary to diastolic CHF and significant edema LE/abdomen  · Lactic acid 2 0    Cellulitis  Assessment & Plan  · Bilateral upper thighs, lower abdomen, and on the left hip  · Failed outpatient antibiotics with doxycycline started on 11/11  · History of MRSA with panniculitis and previously saw surgery as an outpatient    Hypokalemia  Assessment & Plan  · Replete and monitor    Lymphedema of extremity  Assessment & Plan  · Will give IV Lasix 80mg daily, patient takes 80mg PO and 20mg PRN 10 pound weight gain  · Consider outpatient referral to lymphedema clinic    Chronic diastolic congestive heart failure ([facility] )  Assessment & Plan  Wt Readings from Last 3 Encounters:   11/18/21 (!) 172 kg (380 lb)   10/03/20 (!) 210 kg (462 lb 15 5 oz)   09/28/20 (!) 191 kg (422 lb)     · Continue Lasix        Morbid obesity with BMI of 60 0-69 9, adult ([facility] )  Assessment & Plan  · Healthy diet recommended  · Consider referral to dietitian as an outpatient of the bariatric center    Idiopathic chronic 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  Assessment & Plan  · Continue allopurinol    Mild episode of recurrent major depressive disorder ([facility] )  Assessment & Plan  · cymbalta    Other specified hypothyroidism  Assessment & Plan  · Continue levothyroxine    Paroxysmal atrial fibrillation (HCC)  Assessment & Plan  · Monitor rate  · Continue anticoagulation    TeleMedicine H&P - [facility] Internal Medicine    Patient Information: Isabella Zabala 80 y o  female MRN: [id number]  Unit/Bed#: 7T [facility] 702-01 Encounter: [id number]  Admitting Physician: Dr Jason Machado  PCP: ORACIO Saucedo  Date of Admission:  11/18/21    REQUIRED DOCUMENTATION:     1  This service was provided via Telemedicine  2  Provider located at [facility]  3  TeleMed provider: Moises George PA-C   4  Identify all parties in room with patient during tele consult:  Tiara Mendez RN  5  After connecting through [employer], patient was identified by name and date of birth and assistant checked wristband  Patient was then informed that this was a Telemedicine visit and that the exam was being conducted confidentially over secure lines  My office door was closed  No one else was in the room  Patient acknowledged consent and understanding of privacy and security of the Telemedicine visit, and gave us permission to have the assistant stay in the room in order to assist with the history and to conduct the exam   I informed the patient that I have reviewed their record in Epic and presented the opportunity for them to ask any questions regarding the visit today  The patient agreed to participate  VTE Prophylaxis: coumadin  Code Status: full code  Discussion with patient    Anticipated Length of Stay:  Patient will be admitted on an Inpatient basis with an anticipated length of stay of  > 2 midnights  Justification for Hospital Stay: IV abx    Chief Complaint:   Cellulitis    History of Present Illness:    Dileep Enamorado is a 80 y o  female who has past medical history C diff colitis, diastolic CHF, depression with anxiety, hypothyroidism, morbid obesity BMI greater than 60, history of MRSA with panniculitis on the abdomen, lymphedema presents with cellulitis  Patient was treated as an outpatient on 11/11 with doxycycline 100 mg b i d  ORACIO was there for a visit today and noted no improvement and came to the hospital for IV abx     She reports no improvement in the lower extremities stomach and left hip  Noted fever at home, and in ER, was given Tylenol    Review of Systems:    Review of Systems   Constitutional: Positive for chills and fever  HENT: Negative for rhinorrhea, sore throat and trouble swallowing  Eyes: Negative for discharge and redness  Respiratory: Positive for cough  Negative for shortness of breath  Cardiovascular: Positive for leg swelling  Negative for chest pain  Gastrointestinal: Negative for abdominal pain, diarrhea, nausea and vomiting  Genitourinary: Positive for vaginal bleeding (w/u in progress for hysterectomy)  Negative for dysuria and hematuria  Musculoskeletal: Positive for arthralgias  Negative for back pain and neck pain  Skin: Positive for color change  Negative for wound  Neurological: Positive for dizziness and headaches  Negative for weakness  Psychiatric/Behavioral: Negative for agitation and confusion  Past Medical and Surgical History:     Past Medical History:   Diagnosis Date    Atrial fibrillation ([place] [address] )     Bladder stone     C  difficile colitis     Cellulitis     CHF (congestive heart failure) ([place] [address] )     Depression with anxiety     Disease of thyroid gland     Diverticulitis     Enterocolitis     History of abnormal cervical Pap smear     Kidney stone     Lymphedema     Menopause     Age 53    Morbid obesity with BMI of 70 and over, adult ([place] [address] )     MRSA (methicillin resistant Staphylococcus aureus)     abdominal wound    Osteoarthritis     Phlebitis     left lower leg    Spondylolysis        Past Surgical History:   Procedure Laterality Date    APPENDECTOMY      CARPAL TUNNEL RELEASE      CHOLECYSTECTOMY      CYSTOSCOPY      stent    FOOT SURGERY  1982    bone spur    KNEE SURGERY      WISDOM TOOTH EXTRACTION         Meds/Allergies:    Prior to Admission medications    Medication Sig Start Date End Date Taking?  Authorizing Provider   acetaminophen (TYLENOL) 650 mg CR tablet Take 650 mg by mouth every 8 (eight) hours    Historical Provider, MD   allopurinol (ZYLOPRIM) 100 mg tablet Take 1 tablet (100 mg total) by mouth daily 9/30/19   Meghana Mustafa DO   DULoxetine (CYMBALTA) 20 mg capsule Take 1 capsule (20 mg total) by mouth daily 8/15/18   Meghana Mustafa DO   famotidine (PEPCID) 20 mg tablet Take 20 mg by mouth 2 (two) times a day  Patient not taking: Reported on 8/17/2021    Historical Provider, MD   furosemide (LASIX) 40 mg tablet Take 1 tablet (40 mg total) by mouth daily  Patient taking differently: Take 60 mg by mouth daily  8/23/19   Meghana Mustafa DO   furosemide (LASIX) 80 mg tablet  10/4/21   Historical Provider, MD   levothyroxine 88 mcg tablet Take 1 tablet (88 mcg total) by mouth daily  Patient taking differently: Take 125 mcg by mouth daily  9/30/19   Meghana Mustafa DO   warfarin (COUMADIN) 2 5 mg tablet Take 1 tablet (2 5 mg total) by mouth daily  Patient taking differently: Take 2 5 mg by mouth every other day Monday, Wednesday, Friday, sunday 1/2/20   Meghana Mustafa DO   warfarin (COUMADIN) 4 mg tablet Take 1 tablet daily as directed  Patient taking differently: 5 mg every other day Tuesday, Thursday, saturday 4/26/18   Meghana Mustafa DO     all medications and allergies reviewed    Allergies:    Allergies   Allergen Reactions    Augmentin Es-600  [Amoxicillin-Pot Clavulanate]     Penicillins     Sulfa Antibiotics Hives    Vitamin C [Ascorbate - Food Allergy]     Latex Rash and Edema       Social History:     Marital Status: Single   Occupation: retired  Patient Pre-hospital Living Situation: home with home health care  Patient Pre-hospital Level of Mobility: bed bound  Patient Pre-hospital Diet Restrictions: no salt, 96 oz  Substance Use History:   Social History     Substance and Sexual Activity   Alcohol Use Never     Social History     Tobacco Use   Smoking Status Former Smoker   Smokeless Tobacco Never Used   Tobacco Comment    5 packs/day until 6 (age 17-19) - As per [employer]      Social History     Substance and Sexual Activity   Drug Use Never    Comment: As a teen - As per [employer]        Family History:  I have reviewed the patients family history     Physical Exam:     Vitals:   Blood Pressure: 96/53 (11/18/21 2018)  Pulse: 80 (11/18/21 2018)  Temperature: 98 6 °F (37 °C) (11/18/21 2018)  Temp Source: Temporal (11/18/21 2018)  Respirations: 20 (11/18/21 2018)  Height: 5' 3" (160 cm) (11/18/21 2018)  SpO2: 96 % (11/18/21 2018)    Physical Exam  Vitals reviewed  Constitutional:       Appearance: Normal appearance  She is morbidly obese  HENT:      Head: Normocephalic and atraumatic  Nose: Nose normal    Eyes:      General:         Right eye: No discharge  Left eye: No discharge  Extraocular Movements: Extraocular movements intact  Cardiovascular:      Rate and Rhythm: Normal rate  Comments: Rate 80  Pulmonary:      Effort: Pulmonary effort is normal       Comments: Speak in full sentences without difficulty  Musculoskeletal:      Cervical back: Normal range of motion  Right lower leg: Edema present  Left lower leg: Edema present  Skin:     Comments: See images in Media, +erythema, edema of abdomen/skin folds, thighs   Neurological:      General: No focal deficit present  Mental Status: She is alert and oriented to person, place, and time  Mental status is at baseline  Motor: Weakness present  Comments: Patient is bed bound   Psychiatric:         Mood and Affect: Mood normal          Behavior: Behavior normal            Additional Data:     Lab Results: I have personally reviewed pertinent reports        Results from last 7 days   Lab Units 11/18/21  1239   WBC Thousand/uL 25 60*   HEMOGLOBIN g/dL 11 3*   HEMATOCRIT % 35 8*   PLATELETS Thousands/uL 632*   BANDS PCT % 4   LYMPHO PCT % 3*   MONO PCT % 1*     Results from last 7 days   Lab Units 11/18/21  1239   SODIUM mmol/L 134   POTASSIUM mmol/L 3 3*   CHLORIDE mmol/L 93*   CO2 mmol/L 33*   BUN mg/dL 29*   CREATININE mg/dL 1 01   ANION GAP mmol/L 8   CALCIUM mg/dL 9 1   GLUCOSE RANDOM mg/dL 159*                 Results from last 7 days   Lab Units 11/18/21  1239   LACTIC ACID mmol/L 2 0       [facility] / Care Everywhere Records Reviewed: Yes    ** Please Note: This note has been constructed using a voice recognition system   **

## 2021-11-20 LAB
ANION GAP SERPL CALCULATED.3IONS-SCNC: 4 MMOL/L (ref 5–14)
BASOPHILS # BLD AUTO: 0 THOUSANDS/ΜL (ref 0–0.1)
BASOPHILS NFR BLD AUTO: 0 % (ref 0–1)
BUN SERPL-MCNC: 34 MG/DL (ref 5–25)
CALCIUM SERPL-MCNC: 8.7 MG/DL (ref 8.4–10.2)
CHLORIDE SERPL-SCNC: 101 MMOL/L (ref 97–108)
CO2 SERPL-SCNC: 32 MMOL/L (ref 22–30)
CREAT SERPL-MCNC: 1.11 MG/DL (ref 0.6–1.2)
EOSINOPHIL # BLD AUTO: 0.1 THOUSAND/ΜL (ref 0–0.4)
EOSINOPHIL NFR BLD AUTO: 1 % (ref 0–6)
ERYTHROCYTE [DISTWIDTH] IN BLOOD BY AUTOMATED COUNT: 16.9 %
GFR SERPL CREATININE-BSD FRML MDRD: 50 ML/MIN/1.73SQ M
GLUCOSE SERPL-MCNC: 118 MG/DL (ref 70–99)
HCT VFR BLD AUTO: 32.3 % (ref 36–46)
HGB BLD-MCNC: 10.1 G/DL (ref 12–16)
INR PPP: 2.27 (ref 0.84–1.19)
LYMPHOCYTES # BLD AUTO: 0.9 THOUSANDS/ΜL (ref 0.5–4)
LYMPHOCYTES NFR BLD AUTO: 7 % (ref 25–45)
MCH RBC QN AUTO: 26.4 PG (ref 26–34)
MCHC RBC AUTO-ENTMCNC: 31.2 G/DL (ref 31–36)
MCV RBC AUTO: 84 FL (ref 80–100)
MONOCYTES # BLD AUTO: 0.6 THOUSAND/ΜL (ref 0.2–0.9)
MONOCYTES NFR BLD AUTO: 5 % (ref 1–10)
NEUTROPHILS # BLD AUTO: 10.9 THOUSANDS/ΜL (ref 1.8–7.8)
NEUTS SEG NFR BLD AUTO: 86 % (ref 45–65)
PLATELET # BLD AUTO: 590 THOUSANDS/UL (ref 150–450)
PMV BLD AUTO: 7.3 FL (ref 8.9–12.7)
POTASSIUM SERPL-SCNC: 3.7 MMOL/L (ref 3.6–5)
PROTHROMBIN TIME: 23.9 SECONDS (ref 11.6–14.5)
RBC # BLD AUTO: 3.83 MILLION/UL (ref 4–5.2)
SODIUM SERPL-SCNC: 137 MMOL/L (ref 137–147)
VANCOMYCIN TROUGH SERPL-MCNC: 25.2 UG/ML (ref 10–20)
WBC # BLD AUTO: 12.6 THOUSAND/UL (ref 4.5–11)

## 2021-11-20 PROCEDURE — 80202 ASSAY OF VANCOMYCIN: CPT | Performed by: INTERNAL MEDICINE

## 2021-11-20 PROCEDURE — 99232 SBSQ HOSP IP/OBS MODERATE 35: CPT | Performed by: INTERNAL MEDICINE

## 2021-11-20 PROCEDURE — 85025 COMPLETE CBC W/AUTO DIFF WBC: CPT | Performed by: INTERNAL MEDICINE

## 2021-11-20 PROCEDURE — 85610 PROTHROMBIN TIME: CPT | Performed by: INTERNAL MEDICINE

## 2021-11-20 PROCEDURE — 80048 BASIC METABOLIC PNL TOTAL CA: CPT | Performed by: INTERNAL MEDICINE

## 2021-11-20 RX ORDER — NYSTATIN 100000 [USP'U]/G
POWDER TOPICAL 2 TIMES DAILY
Status: DISCONTINUED | OUTPATIENT
Start: 2021-11-20 | End: 2021-11-21 | Stop reason: HOSPADM

## 2021-11-20 RX ADMIN — WARFARIN SODIUM 4 MG: 2 TABLET ORAL at 17:00

## 2021-11-20 RX ADMIN — ALLOPURINOL 100 MG: 100 TABLET ORAL at 08:01

## 2021-11-20 RX ADMIN — LEVOTHYROXINE SODIUM 125 MCG: 125 TABLET ORAL at 05:02

## 2021-11-20 RX ADMIN — POTASSIUM CHLORIDE 20 MEQ: 1500 TABLET, EXTENDED RELEASE ORAL at 17:40

## 2021-11-20 RX ADMIN — POTASSIUM CHLORIDE 20 MEQ: 1500 TABLET, EXTENDED RELEASE ORAL at 08:01

## 2021-11-20 RX ADMIN — NYSTATIN: 100000 POWDER TOPICAL at 17:40

## 2021-11-20 RX ADMIN — VANCOMYCIN HYDROCHLORIDE 1750 MG: 1 INJECTION, POWDER, LYOPHILIZED, FOR SOLUTION INTRAVENOUS at 13:21

## 2021-11-20 RX ADMIN — DULOXETINE 20 MG: 20 CAPSULE, DELAYED RELEASE ORAL at 08:01

## 2021-11-20 RX ADMIN — VANCOMYCIN HYDROCHLORIDE 1750 MG: 1 INJECTION, POWDER, LYOPHILIZED, FOR SOLUTION INTRAVENOUS at 01:54

## 2021-11-20 NOTE — ASSESSMENT & PLAN NOTE
· Secondary to cellulitis noted by fever tachycardia and tachypnea, leukocytosis  · Patient is afebrile, leukocytosis improved  · S/p Cefepime 2 g and vancomycin 1750 mg  · ID on board -Continue vancomycin    Discontinue cefepime  · Caution with fluid secondary to diastolic CHF and significant edema LE/abdomen  · Blood culture - no growth till date

## 2021-11-20 NOTE — ASSESSMENT & PLAN NOTE
· Bilateral upper thighs, lower abdomen, and on the left hip  · Failed outpatient antibiotics with doxycycline started on 11/11  · History of MRSA with panniculitis and previously saw surgery as an outpatient  · ID consult -see above

## 2021-11-20 NOTE — PLAN OF CARE
Problem: MOBILITY - ADULT  Goal: Maintain or return to baseline ADL function  Description: INTERVENTIONS:  -  Assess patient's ability to carry out ADLs; assess patient's baseline for ADL function and identify physical deficits which impact ability to perform ADLs (bathing, care of mouth/teeth, toileting, grooming, dressing, etc )  - Assess/evaluate cause of self-care deficits   - Assess range of motion  - Assess patient's mobility; develop plan if impaired  - Assess patient's need for assistive devices and provide as appropriate  - Encourage maximum independence but intervene and supervise when necessary  - Involve family in performance of ADLs  - Assess for home care needs following discharge   - Consider OT consult to assist with ADL evaluation and planning for discharge  - Provide patient education as appropriate  Outcome: Progressing  Goal: Maintains/Returns to pre admission functional level  Description: INTERVENTIONS:  - Perform BMAT or MOVE assessment daily    - Set and communicate daily mobility goal to care team and patient/family/caregiver  - Collaborate with rehabilitation services on mobility goals if consulted  - Perform Range of Motion 3 times a day  - Reposition patient every 2 hours        - Out of bed for toileting - pt bedbound  - Record patient progress and toleration of activity level   Outcome: Progressing

## 2021-11-20 NOTE — PROGRESS NOTES
Vancomycin Assessment    Polly Tafoya is a 80 y o  female who is currently receiving vancomycin 1750 mg IV every 12 hours for skin-soft tissue infection and bacteremia  Relevant clinical data and objective history reviewed:  Creatinine   Date Value Ref Range Status   11/20/2021 1 11 0 60 - 1 20 mg/dL Final     Comment:     Standardized to IDMS reference method   11/19/2021 1 18 0 60 - 1 20 mg/dL Final     Comment:     Standardized to IDMS reference method   11/18/2021 1 01 0 60 - 1 20 mg/dL Final     Comment:     Standardized to IDMS reference method   05/19/2018 1 03 0 6 - 1 2 MG/DL Final     Comment:     IDMS method performed  The drugs Metamizole, Sulfasalazine, and Sulfapyridine may interfere and  result in false low results  /56 (BP Location: Right arm)   Pulse 63   Temp 98 °F (36 7 °C) (Temporal)   Resp 18   Ht 5' 3" (1 6 m)   LMP  (LMP Unknown)   SpO2 94%   BMI 67 31 kg/m²   I/O last 3 completed shifts: In: 720 [P O :720]  Out: 1025 [Urine:1025]  Lab Results   Component Value Date/Time    BUN 34 (H) 11/20/2021 04:52 AM    BUN 33 (H) 05/19/2018 06:15 PM    WBC 12 60 (H) 11/20/2021 04:52 AM    WBC 9 1 05/19/2018 06:15 PM    HGB 10 1 (L) 11/20/2021 04:52 AM    HGB 12 3 05/19/2018 06:15 PM    HCT 32 3 (L) 11/20/2021 04:52 AM    HCT 38 5 05/19/2018 06:15 PM    MCV 84 11/20/2021 04:52 AM    MCV 93 3 05/19/2018 06:15 PM     (H) 11/20/2021 04:52 AM     05/19/2018 06:15 PM     Temp Readings from Last 3 Encounters:   11/20/21 98 °F (36 7 °C) (Temporal)   11/18/21 100 5 °F (38 1 °C)   08/17/21 (!) 96 9 °F (36 1 °C) (Tympanic)     Vancomycin Days of Therapy: 3     Assessment/Plan  The patient is currently on vancomycin utilizing scheduled dosing    The patient is receiving 1750 mg IV every 12 hours with the most recent vancomycin level being at steady-state and supratherapeutic (trough 25 2 drawn at 12:34 on 11/20/2021) based on a goal of 15-20 (appropriate for most indications) ; therefore, after clinical evaluation will be changed to 1250 mg IV every 12 hours starting at 06:00 on 11/21/2021  Pharmacy will continue to follow closely for s/sx of nephrotoxicity, infusion reactions, and appropriateness of therapy  BMP and CBC will be ordered per protocol  Plan for trough as patient approaches steady state, prior to the 4th  dose at approximately 17:30 on 11/22/2021  Pharmacy will continue to follow the patients culture results and clinical progress daily      Neto Yeh, Pharmacist

## 2021-11-20 NOTE — PROGRESS NOTES
51 [facility]  Progress Note - Royce Egan 1950, 80 y o  female MRN: [id number]  Unit/Bed#: 7T [facility] 702-01 Encounter: [id number]  Primary Care Provider: ORACIO Hardy   Date and time admitted to hospital: 11/18/2021  8:12 PM    * Sepsis [facility])  Assessment & Plan  · Secondary to cellulitis noted by fever tachycardia and tachypnea, leukocytosis  · Patient is afebrile, leukocytosis improved  · S/p Cefepime 2 g and vancomycin 1750 mg  · ID on board -Continue vancomycin  Discontinue cefepime  · Caution with fluid secondary to diastolic CHF and significant edema LE/abdomen  · Blood culture - no growth till date    Cellulitis  Assessment & Plan  · Bilateral upper thighs, lower abdomen, and on the left hip  · Failed outpatient antibiotics with doxycycline started on 11/11  · History of MRSA with panniculitis and previously saw surgery as an outpatient  · ID consult -see above    Hypokalemia  Assessment & Plan  · Replete p r n      Lymphedema of extremity  Assessment & Plan  · Continue IV Lasix 40 b i d , patient takes 80mg PO and 20mg PRN 10 pound weight gain  · Consider outpatient referral to lymphedema clinic    Chronic diastolic congestive heart failure (HCC)  Assessment & Plan  Wt Readings from Last 3 Encounters:   11/18/21 (!) 172 kg (380 lb)   10/03/20 (!) 210 kg (462 lb 15 5 oz)   09/28/20 (!) 191 kg (422 lb)     · Continue IV Lasix 40 b i d         Morbid obesity with BMI of 60 0-69 9, adult (HCC)  Assessment & Plan  · Healthy diet recommended  · Consider referral to dietitian as an outpatient of the bariatric center    Idiopathic chronic 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  Assessment & Plan  · Continue allopurinol    Mild episode of recurrent major depressive disorder ([facility] )  Assessment & Plan  · Continue cymbalta    Other specified hypothyroidism  Assessment & Plan  · Continue levothyroxine    Paroxysmal atrial fibrillation (HCC)  Assessment & Plan  · Heart rate is well controlled  · Continue home dose warfarin  · INR is therapeutic      VTE Pharmacologic Prophylaxis:   Pharmacologic: Warfarin (Coumadin)  Mechanical VTE Prophylaxis in Place: Yes    Patient Centered Rounds: I have performed bedside rounds with nursing staff today  Discussions with Specialists or Other Care Team Provider:  Discussed with ID, Case Management nurse    Education and Discussions with Family / Patient:  Discussed with patient    Time Spent for Care: 45 minutes  More than 50% of total time spent on counseling and coordination of care as described above  Current Length of Stay: 2 day(s)    Current Patient Status: Inpatient   Certification Statement: The patient will continue to require additional inpatient hospital stay due to Cellulitis failed outpatient treatment    Discharge Plan / Estimated Discharge Date:  Possibly tomorrow      Code Status: Level 1 - Full Code      Subjective:   Patient was seen and examined at bedside  The patient has chronic pain  She denies any fever, chills, chest pain, palpitation, shortness of breath, N/V, abd pain  Objective:     Vitals:   Temp (24hrs), Av 8 °F (36 6 °C), Min:97 2 °F (36 2 °C), Max:98 5 °F (36 9 °C)    Temp:  [97 2 °F (36 2 °C)-98 5 °F (36 9 °C)] 98 °F (36 7 °C)  HR:  [63-83] 63  Resp:  [18] 18  BP: (102-117)/(56-78) 104/56  SpO2:  [92 %-95 %] 94 %  Body mass index is 67 31 kg/m²  Input and Output Summary (last 24 hours):        Intake/Output Summary (Last 24 hours) at 2021 0713  Last data filed at 2021 0445  Gross per 24 hour   Intake 720 ml   Output 1025 ml   Net -305 ml       Physical Exam:     Physical Exam  General: Elderly female patient lying in bed, breathing well on room air, no acute distress  HEENT: NC/AT, PERRL, EOM - normal  Neck: Supple  Pulm/Chest: Normal chest wall expansion, clear breath sounds on both side, no wheezing/rhonchi or crackles appreciated  CVS: RRR, normal S1&S2, no murmur appreciated, capillary refill <2s  Abd: soft, non tender, non distended, bowel sounds +  MSK: move all 4 extremities spontaneously, panniculitis and cellulitis over abdomen, bilateral lower extremity  Skin: warm  CNS: no acute focal neuro deficit      Additional Data:     Labs:    Results from last 7 days   Lab Units 11/20/21  0452   WBC Thousand/uL 12 60*   HEMOGLOBIN g/dL 10 1*   HEMATOCRIT % 32 3*   PLATELETS Thousands/uL 590*   NEUTROS PCT % 86*   LYMPHS PCT % 7*   MONOS PCT % 5   EOS PCT % 1     Results from last 7 days   Lab Units 11/20/21  0452   POTASSIUM mmol/L 3 7   CHLORIDE mmol/L 101   CO2 mmol/L 32*   BUN mg/dL 34*   CREATININE mg/dL 1 11   CALCIUM mg/dL 8 7     Results from last 7 days   Lab Units 11/20/21  0452   INR  2 27*       * I Have Reviewed All Lab Data Listed Above  * Additional Pertinent Lab Tests Reviewed: Tracy 66 Admission Reviewed    Imaging:    Imaging Reports Reviewed Today Include: None  Imaging Personally Reviewed by Myself Includes:  None      Recent Cultures (last 7 days):     Results from last 7 days   Lab Units 11/18/21  1241 11/18/21  1239   BLOOD CULTURE  No Growth at 24 hrs  No Growth at 24 hrs         Last 24 Hours Medication List:   Current Facility-Administered Medications   Medication Dose Route Frequency Provider Last Rate    acetaminophen  650 mg Oral Q4H PRN Sourav Castro PA-C      allopurinol  100 mg Oral Daily [place], [place]      DULoxetine  20 mg Oral Daily [place], [place]      furosemide  40 mg Intravenous BID (diuretic) Yousif Loco MD      levothyroxine  125 mcg Oral Daily [place], [place]      ondansetron  4 mg Intravenous Q6H PRN Sourav Castro PA-C      potassium chloride  20 mEq Oral BID Sourav Castro PA-C      vancomycin  17 5 mg/kg (Adjusted) Intravenous Q12H Yousif Loco MD 1,750 mg (11/20/21 0154)    warfarin  2 mg Oral Once per day on Sun Mon Wed Fri [facility] MYA Dey      warfarin  4 mg Oral Once per day on Tue Thu Sat Sarah Wolff PA-C          Today, Patient Was Seen By: Gigi Gallardo MD    ** Please Note: Dragon 360 Dictation voice to text software may have been used in the creation of this document   **

## 2021-11-20 NOTE — PLAN OF CARE
Problem: MOBILITY - ADULT  Goal: Maintain or return to baseline ADL function  Description: INTERVENTIONS:  -  Assess patient's ability to carry out ADLs; assess patient's baseline for ADL function and identify physical deficits which impact ability to perform ADLs (bathing, care of mouth/teeth, toileting, grooming, dressing, etc )  - Assess/evaluate cause of self-care deficits   - Assess range of motion  - Assess patient's mobility; develop plan if impaired  - Assess patient's need for assistive devices and provide as appropriate  - Encourage maximum independence but intervene and supervise when necessary  - Involve family in performance of ADLs  - Assess for home care needs following discharge   - Consider OT consult to assist with ADL evaluation and planning for discharge  - Provide patient education as appropriate  Outcome: Progressing  Goal: Maintains/Returns to pre admission functional level  Description: INTERVENTIONS:  - Perform BMAT or MOVE assessment daily    - Set and communicate daily mobility goal to care team and patient/family/caregiver     - Collaborate with rehabilitation services on mobility goals if consulted  - Perform Range of Motion   - Out of bed for toileting  - Record patient progress and toleration of activity level   Outcome: Progressing     Problem: NEUROSENSORY - ADULT  Goal: Achieves stable or improved neurological status  Description: INTERVENTIONS  - Monitor and report changes in neurological status  - Monitor vital signs such as temperature, blood pressure, glucose, and any other labs ordered   - Initiate measures to prevent increased intracranial pressure  - Monitor for seizure activity and implement precautions if appropriate      Outcome: Progressing     Problem: CARDIOVASCULAR - ADULT  Goal: Maintains optimal cardiac output and hemodynamic stability  Description: INTERVENTIONS:  - Monitor I/O, vital signs and rhythm  - Monitor for S/S and trends of decreased cardiac output  - Administer and titrate ordered vasoactive medications to optimize hemodynamic stability  - Assess quality of pulses, skin color and temperature  - Assess for signs of decreased coronary artery perfusion  - Instruct patient to report change in severity of symptoms  Outcome: Progressing     Problem: RESPIRATORY - ADULT  Goal: Achieves optimal ventilation and oxygenation  Description: INTERVENTIONS:  - Assess for changes in respiratory status  - Assess for changes in mentation and behavior  - Position to facilitate oxygenation and minimize respiratory effort  - Oxygen administered by appropriate delivery if ordered  - Initiate smoking cessation education as indicated  - Encourage broncho-pulmonary hygiene including cough, deep breathe, Incentive Spirometry  - Assess the need for suctioning and aspirate as needed  - Assess and instruct to report SOB or any respiratory difficulty  - Respiratory Therapy support as indicated  Outcome: Progressing     Problem: GASTROINTESTINAL - ADULT  Goal: Minimal or absence of nausea and/or vomiting  Description: INTERVENTIONS:  - Administer IV fluids if ordered to ensure adequate hydration  - Maintain NPO status until nausea and vomiting are resolved  - Nasogastric tube if ordered  - Administer ordered antiemetic medications as needed  - Provide nonpharmacologic comfort measures as appropriate  - Advance diet as tolerated, if ordered  - Consider nutrition services referral to assist patient with adequate nutrition and appropriate food choices  Outcome: Progressing  Goal: Maintains or returns to baseline bowel function  Description: INTERVENTIONS:  - Assess bowel function  - Encourage oral fluids to ensure adequate hydration  - Administer IV fluids if ordered to ensure adequate hydration  - Administer ordered medications as needed  - Encourage mobilization and activity  - Consider nutritional services referral to assist patient with adequate nutrition and appropriate food choices  Outcome: Progressing     Problem: GENITOURINARY - ADULT  Goal: Maintains or returns to baseline urinary function  Description: INTERVENTIONS:  - Assess urinary function  - Encourage oral fluids to ensure adequate hydration if ordered  - Administer IV fluids as ordered to ensure adequate hydration  - Administer ordered medications as needed  - Offer frequent toileting  - Follow urinary retention protocol if ordered  Outcome: Progressing  Goal: Absence of urinary retention  Description: INTERVENTIONS:  - Assess patients ability to void and empty bladder  - Monitor I/O  - Bladder scan as needed  - Discuss with physician/AP medications to alleviate retention as needed  - Discuss catheterization for long term situations as appropriate  Outcome: Progressing     Problem: METABOLIC, FLUID AND ELECTROLYTES - ADULT  Goal: Electrolytes maintained within normal limits  Description: INTERVENTIONS:  - Monitor labs and assess patient for signs and symptoms of electrolyte imbalances  - Administer electrolyte replacement as ordered  - Monitor response to electrolyte replacements, including repeat lab results as appropriate  - Instruct patient on fluid and nutrition as appropriate  Outcome: Progressing  Goal: Fluid balance maintained  Description: INTERVENTIONS:  - Monitor labs   - Monitor I/O and WT  - Instruct patient on fluid and nutrition as appropriate  - Assess for signs & symptoms of volume excess or deficit  Outcome: Progressing     Problem: SKIN/TISSUE INTEGRITY - ADULT  Goal: Skin Integrity remains intact(Skin Breakdown Prevention)  Description: Assess:  -Perform Gucci assessment   -Assess extremities for adequate circulation and sensation     Bed Management:  -Have minimal linens on bed & keep smooth, unwrinkled  -Change linens as needed when moist or perspiring      Toileting:  -Offer bedside commode  -Assess for incontinence   -Use incontinent care products after each incontinent episode     Activity:    Skin Care:  -Avoid use of baby powder, tape, friction and shearing, hot water or constrictive clothing  -Relieve pressure over bony prominences   -Do not massage red bony areas    Next Steps:  -Teach patient strategies to minimize risks   Outcome: Progressing  Goal: Incision(s), wounds(s) or drain site(s) healing without S/S of infection  Description: INTERVENTIONS  - Assess and document dressing, incision, wound bed, drain sites and surrounding tissue  - Provide patient and family education  - Perform skin care/dressing changes   Outcome: Progressing     Problem: HEMATOLOGIC - ADULT  Goal: Maintains hematologic stability  Description: INTERVENTIONS  - Assess for signs and symptoms of bleeding or hemorrhage  - Monitor labs  - Administer supportive blood products/factors as ordered and appropriate  Outcome: Progressing     Problem: MUSCULOSKELETAL - ADULT  Goal: Maintain or return mobility to safest level of function  Description: INTERVENTIONS:  - Assess patient's ability to carry out ADLs; assess patient's baseline for ADL function and identify physical deficits which impact ability to perform ADLs (bathing, care of mouth/teeth, toileting, grooming, dressing, etc )  - Assess/evaluate cause of self-care deficits   - Assess range of motion  - Assess patient's mobility  - Assess patient's need for assistive devices and provide as appropriate  - Encourage maximum independence but intervene and supervise when necessary  - Involve family in performance of ADLs  - Assess for home care needs following discharge   - Consider OT consult to assist with ADL evaluation and planning for discharge  - Provide patient education as appropriate  Outcome: Progressing  Goal: Maintain proper alignment of affected body part  Description: INTERVENTIONS:  - Support, maintain and protect limb and body alignment  - Provide patient/ family with appropriate education  Outcome: Progressing     Problem: Potential for Falls  Goal: Patient will remain free of falls  Description: INTERVENTIONS:  - Educate patient/family on patient safety including physical limitations  - Instruct patient to call for assistance with activity   - Consult OT/PT to assist with strengthening/mobility   - Keep Call bell within reach  - Keep bed low and locked with side rails adjusted as appropriate  - Keep care items and personal belongings within reach  - Initiate and maintain comfort rounds  - Make Fall Risk Sign visible to staff    - Apply yellow socks and bracelet for high fall risk patients  - Consider moving patient to room near nurses station  Outcome: Progressing     Problem: Prexisting or High Potential for Compromised Skin Integrity  Goal: Skin integrity is maintained or improved  Description: INTERVENTIONS:  - Identify patients at risk for skin breakdown  - Assess and monitor skin integrity  - Assess and monitor nutrition and hydration status  - Monitor labs   - Assess for incontinence   - Turn and reposition patient  - Assist with mobility/ambulation  - Relieve pressure over bony prominences  - Avoid friction and shearing  - Provide appropriate hygiene as needed including keeping skin clean and dry  - Evaluate need for skin moisturizer/barrier cream  - Collaborate with interdisciplinary team   - Patient/family teaching  - Consider wound care consult   Outcome: Progressing

## 2021-11-21 VITALS
DIASTOLIC BLOOD PRESSURE: 55 MMHG | TEMPERATURE: 96.2 F | RESPIRATION RATE: 21 BRPM | HEIGHT: 63 IN | HEART RATE: 60 BPM | SYSTOLIC BLOOD PRESSURE: 94 MMHG | BODY MASS INDEX: 67.31 KG/M2 | OXYGEN SATURATION: 94 %

## 2021-11-21 LAB
ANION GAP SERPL CALCULATED.3IONS-SCNC: 2 MMOL/L (ref 5–14)
BASOPHILS # BLD AUTO: 0 THOUSANDS/ΜL (ref 0–0.1)
BASOPHILS NFR BLD AUTO: 1 % (ref 0–1)
BUN SERPL-MCNC: 29 MG/DL (ref 5–25)
CALCIUM SERPL-MCNC: 8.6 MG/DL (ref 8.4–10.2)
CHLORIDE SERPL-SCNC: 102 MMOL/L (ref 97–108)
CO2 SERPL-SCNC: 32 MMOL/L (ref 22–30)
CREAT SERPL-MCNC: 1 MG/DL (ref 0.6–1.2)
EOSINOPHIL # BLD AUTO: 0.2 THOUSAND/ΜL (ref 0–0.4)
EOSINOPHIL NFR BLD AUTO: 2 % (ref 0–6)
ERYTHROCYTE [DISTWIDTH] IN BLOOD BY AUTOMATED COUNT: 17.1 %
GFR SERPL CREATININE-BSD FRML MDRD: 57 ML/MIN/1.73SQ M
GLUCOSE SERPL-MCNC: 105 MG/DL (ref 70–99)
HCT VFR BLD AUTO: 32.5 % (ref 36–46)
HGB BLD-MCNC: 10.1 G/DL (ref 12–16)
INR PPP: 2.01 (ref 0.84–1.19)
LYMPHOCYTES # BLD AUTO: 0.8 THOUSANDS/ΜL (ref 0.5–4)
LYMPHOCYTES NFR BLD AUTO: 8 % (ref 25–45)
MCH RBC QN AUTO: 26.2 PG (ref 26–34)
MCHC RBC AUTO-ENTMCNC: 31.2 G/DL (ref 31–36)
MCV RBC AUTO: 84 FL (ref 80–100)
MONOCYTES # BLD AUTO: 0.5 THOUSAND/ΜL (ref 0.2–0.9)
MONOCYTES NFR BLD AUTO: 6 % (ref 1–10)
NEUTROPHILS # BLD AUTO: 8.2 THOUSANDS/ΜL (ref 1.8–7.8)
NEUTS SEG NFR BLD AUTO: 84 % (ref 45–65)
PLATELET # BLD AUTO: 603 THOUSANDS/UL (ref 150–450)
PMV BLD AUTO: 7.2 FL (ref 8.9–12.7)
POTASSIUM SERPL-SCNC: 4.1 MMOL/L (ref 3.6–5)
PROTHROMBIN TIME: 21.8 SECONDS (ref 11.6–14.5)
RBC # BLD AUTO: 3.86 MILLION/UL (ref 4–5.2)
SODIUM SERPL-SCNC: 136 MMOL/L (ref 137–147)
WBC # BLD AUTO: 9.8 THOUSAND/UL (ref 4.5–11)

## 2021-11-21 PROCEDURE — 99239 HOSP IP/OBS DSCHRG MGMT >30: CPT | Performed by: INTERNAL MEDICINE

## 2021-11-21 PROCEDURE — 85025 COMPLETE CBC W/AUTO DIFF WBC: CPT | Performed by: INTERNAL MEDICINE

## 2021-11-21 PROCEDURE — 80048 BASIC METABOLIC PNL TOTAL CA: CPT | Performed by: INTERNAL MEDICINE

## 2021-11-21 PROCEDURE — 85610 PROTHROMBIN TIME: CPT | Performed by: INTERNAL MEDICINE

## 2021-11-21 RX ORDER — CLINDAMYCIN HYDROCHLORIDE 150 MG/1
450 CAPSULE ORAL 3 TIMES DAILY
Qty: 45 CAPSULE | Refills: 0 | Status: SHIPPED | OUTPATIENT
Start: 2021-11-21 | End: 2021-11-26

## 2021-11-21 RX ORDER — NYSTATIN 100000 [USP'U]/G
POWDER TOPICAL 2 TIMES DAILY
Qty: 15 G | Refills: 0 | Status: SHIPPED | OUTPATIENT
Start: 2021-11-21 | End: 2022-05-28

## 2021-11-21 RX ADMIN — LEVOTHYROXINE SODIUM 125 MCG: 125 TABLET ORAL at 05:08

## 2021-11-21 RX ADMIN — ALLOPURINOL 100 MG: 100 TABLET ORAL at 08:51

## 2021-11-21 RX ADMIN — VANCOMYCIN HYDROCHLORIDE 1250 MG: 1 INJECTION, POWDER, LYOPHILIZED, FOR SOLUTION INTRAVENOUS at 05:08

## 2021-11-21 RX ADMIN — DULOXETINE 20 MG: 20 CAPSULE, DELAYED RELEASE ORAL at 08:51

## 2021-11-21 NOTE — ASSESSMENT & PLAN NOTE
· At home  , patient takes 80mg PO and 20mg PRN 10 pound weight gain  · Consider outpatient referral to lymphedema clinic

## 2021-11-21 NOTE — PLAN OF CARE
Problem: MOBILITY - ADULT  Goal: Maintain or return to baseline ADL function  Description: INTERVENTIONS:  -  Assess patient's ability to carry out ADLs; assess patient's baseline for ADL function and identify physical deficits which impact ability to perform ADLs (bathing, care of mouth/teeth, toileting, grooming, dressing, etc )  - Assess/evaluate cause of self-care deficits   - Assess range of motion  - Assess patient's mobility; develop plan if impaired  - Assess patient's need for assistive devices and provide as appropriate  - Encourage maximum independence but intervene and supervise when necessary  - Involve family in performance of ADLs  - Assess for home care needs following discharge   - Consider OT consult to assist with ADL evaluation and planning for discharge  - Provide patient education as appropriate  Outcome: Progressing  Goal: Maintains/Returns to pre admission functional level  Description: INTERVENTIONS:  - Perform BMAT or MOVE assessment daily    - Set and communicate daily mobility goal to care team and patient/family/caregiver     - Collaborate with rehabilitation services on mobility goals if consulted  - Out of bed for toileting  - Record patient progress and toleration of activity level   Outcome: Progressing     Problem: NEUROSENSORY - ADULT  Goal: Achieves stable or improved neurological status  Description: INTERVENTIONS  - Monitor and report changes in neurological status  - Monitor vital signs such as temperature, blood pressure, glucose, and any other labs ordered   - Initiate measures to prevent increased intracranial pressure  - Monitor for seizure activity and implement precautions if appropriate      Outcome: Progressing     Problem: CARDIOVASCULAR - ADULT  Goal: Maintains optimal cardiac output and hemodynamic stability  Description: INTERVENTIONS:  - Monitor I/O, vital signs and rhythm  - Monitor for S/S and trends of decreased cardiac output  - Administer and titrate ordered vasoactive medications to optimize hemodynamic stability  - Assess quality of pulses, skin color and temperature  - Assess for signs of decreased coronary artery perfusion  - Instruct patient to report change in severity of symptoms  Outcome: Progressing     Problem: RESPIRATORY - ADULT  Goal: Achieves optimal ventilation and oxygenation  Description: INTERVENTIONS:  - Assess for changes in respiratory status  - Assess for changes in mentation and behavior  - Position to facilitate oxygenation and minimize respiratory effort  - Oxygen administered by appropriate delivery if ordered  - Initiate smoking cessation education as indicated  - Encourage broncho-pulmonary hygiene including cough, deep breathe, Incentive Spirometry  - Assess the need for suctioning and aspirate as needed  - Assess and instruct to report SOB or any respiratory difficulty  - Respiratory Therapy support as indicated  Outcome: Progressing     Problem: GASTROINTESTINAL - ADULT  Goal: Minimal or absence of nausea and/or vomiting  Description: INTERVENTIONS:  - Administer IV fluids if ordered to ensure adequate hydration  - Maintain NPO status until nausea and vomiting are resolved  - Nasogastric tube if ordered  - Administer ordered antiemetic medications as needed  - Provide nonpharmacologic comfort measures as appropriate  - Advance diet as tolerated, if ordered  - Consider nutrition services referral to assist patient with adequate nutrition and appropriate food choices  Outcome: Progressing  Goal: Maintains or returns to baseline bowel function  Description: INTERVENTIONS:  - Assess bowel function  - Encourage oral fluids to ensure adequate hydration  - Administer IV fluids if ordered to ensure adequate hydration  - Administer ordered medications as needed  - Encourage mobilization and activity  - Consider nutritional services referral to assist patient with adequate nutrition and appropriate food choices  Outcome: Progressing     Problem: GENITOURINARY - ADULT  Goal: Maintains or returns to baseline urinary function  Description: INTERVENTIONS:  - Assess urinary function  - Encourage oral fluids to ensure adequate hydration if ordered  - Administer IV fluids as ordered to ensure adequate hydration  - Administer ordered medications as needed  - Offer frequent toileting  - Follow urinary retention protocol if ordered  Outcome: Progressing  Goal: Absence of urinary retention  Description: INTERVENTIONS:  - Assess patients ability to void and empty bladder  - Monitor I/O  - Bladder scan as needed  - Discuss with physician/AP medications to alleviate retention as needed  - Discuss catheterization for long term situations as appropriate  Outcome: Progressing     Problem: METABOLIC, FLUID AND ELECTROLYTES - ADULT  Goal: Electrolytes maintained within normal limits  Description: INTERVENTIONS:  - Monitor labs and assess patient for signs and symptoms of electrolyte imbalances  - Administer electrolyte replacement as ordered  - Monitor response to electrolyte replacements, including repeat lab results as appropriate  - Instruct patient on fluid and nutrition as appropriate  Outcome: Progressing  Goal: Fluid balance maintained  Description: INTERVENTIONS:  - Monitor labs   - Monitor I/O and WT  - Instruct patient on fluid and nutrition as appropriate  - Assess for signs & symptoms of volume excess or deficit  Outcome: Progressing      Problem: HEMATOLOGIC - ADULT  Goal: Maintains hematologic stability  Description: INTERVENTIONS  - Assess for signs and symptoms of bleeding or hemorrhage  - Monitor labs  - Administer supportive blood products/factors as ordered and appropriate  Outcome: Progressing     Problem: MUSCULOSKELETAL - ADULT  Goal: Maintain or return mobility to safest level of function  Description: INTERVENTIONS:  - Assess patient's ability to carry out ADLs; assess patient's baseline for ADL function and identify physical deficits which impact ability to perform ADLs (bathing, care of mouth/teeth, toileting, grooming, dressing, etc )  - Assess/evaluate cause of self-care deficits   - Assess range of motion  - Assess patient's mobility  - Assess patient's need for assistive devices and provide as appropriate  - Encourage maximum independence but intervene and supervise when necessary  - Involve family in performance of ADLs  - Assess for home care needs following discharge   - Consider OT consult to assist with ADL evaluation and planning for discharge  - Provide patient education as appropriate  Outcome: Progressing  Goal: Maintain proper alignment of affected body part  Description: INTERVENTIONS:  - Support, maintain and protect limb and body alignment  - Provide patient/ family with appropriate education  Outcome: Progressing     Problem: Potential for Falls  Goal: Patient will remain free of falls  Description: INTERVENTIONS:  - Educate patient/family on patient safety including physical limitations  - Instruct patient to call for assistance with activity   - Consult OT/PT to assist with strengthening/mobility   - Keep Call bell within reach  - Keep bed low and locked with side rails adjusted as appropriate  - Keep care items and personal belongings within reach  - Initiate and maintain comfort rounds  - Make Fall Risk Sign visible to staff  - Apply yellow socks and bracelet for high fall risk patients  - Consider moving patient to room near nurses station  Outcome: Progressing     Problem: Prexisting or High Potential for Compromised Skin Integrity  Goal: Skin integrity is maintained or improved  Description: INTERVENTIONS:  - Identify patients at risk for skin breakdown  - Assess and monitor skin integrity  - Assess and monitor nutrition and hydration status  - Monitor labs   - Assess for incontinence   - Turn and reposition patient  - Assist with mobility/ambulation  - Relieve pressure over bony prominences  - Avoid friction and shearing  - Provide appropriate hygiene as needed including keeping skin clean and dry  - Evaluate need for skin moisturizer/barrier cream  - Collaborate with interdisciplinary team   - Patient/family teaching  - Consider wound care consult   Outcome: Progressing

## 2021-11-21 NOTE — DISCHARGE SUMMARY
[facility]  Discharge- Miki Garza 1950, 80 y o  female MRN: [id number]  Unit/Bed#: 7T [facility] 702-01 Encounter: [id number]  Primary Care Provider: ORACIO Li   Date and time admitted to hospital: 11/18/2021  8:12 PM    * Sepsis [facility])  Assessment & Plan  · Secondary to cellulitis noted by fever tachycardia and tachypnea, leukocytosis  · Patient is afebrile, leukocytosis improved  · S/p Cefepime 2 g and vancomycin 1750 mg  · ID on board -Continue vancomycin  Discontinue cefepime  · Will be discharged on clindamycin as patient has sulfa allergy  · Caution with fluid secondary to diastolic CHF and significant edema LE/abdomen  · Blood culture - no growth till date    Cellulitis  Assessment & Plan  · Bilateral upper thighs, lower abdomen, and on the left hip  · Failed outpatient antibiotics with doxycycline started on 11/11  · History of MRSA with panniculitis and previously saw surgery as an outpatient  · Will be discharged on clindamycin as patient has sulfa allergy      Hypokalemia  Assessment & Plan  · Replete p r n  Lymphedema of extremity  Assessment & Plan  · At home  , patient takes 80mg PO and 20mg PRN 10 pound weight gain  · Consider outpatient referral to lymphedema clinic    Chronic diastolic congestive heart failure (HCC)  Assessment & Plan  Wt Readings from Last 3 Encounters:   11/18/21 (!) 172 kg (380 lb)   10/03/20 (!) 210 kg (462 lb 15 5 oz)   09/28/20 (!) 191 kg (422 lb)     · Continue IV Lasix 40 b i d         Morbid obesity with BMI of 60 0-69 9, adult ([facility] [address] )  Assessment & Plan  · Healthy diet recommended  · Consider referral to dietitian as an outpatient of the bariatric center    Idiopathic chronic 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  Assessment & Plan  · Continue allopurinol    Mild episode of recurrent major depressive disorder ([facility] [address] )  Assessment & Plan  · Continue cymbalta    Other specified hypothyroidism  Assessment & Plan  · Continue levothyroxine    Paroxysmal atrial fibrillation ([facility] [address] )  Assessment & Plan  · Heart rate is well controlled  · Continue home dose warfarin  · INR is therapeutic      Discharging Physician / Practitioner: Sonal Rg MD  PCP: Ellen Uribe  Admission Date:   Admission Orders (From admission, onward)     Ordered        11/18/21 2029  Inpatient Admission  Once                      Discharge Date: 11/21/21    Medical Problems             Resolved Problems  Date Reviewed: 11/21/2021    None                Consultations During Hospital Stay:  · ID    Procedures Performed:   · No    Significant Findings / Test Results:   · No    Incidental Findings:   · No     Test Results Pending at Discharge (will require follow up):   · No     Outpatient Tests Requested:  · No    Complications:  No    Reason for Admission:  Cellulitis    Hospital Course:     Marge Arechiga is a 80 y o  female patient with PMH of C diff colitis, diastolic CHF, anxiety and depression, hypothyroidism, morbid obesity with BMI >60, history of MRSA with panniculitis on abdomen, lymphedema who originally presented to the hospital on 11/18/2021 due to sepsis due to cellulitis  Patient failed doxycycline as an outpatient treatment  Patient was started on IV vancomycin and cefepime  ID was consulted  Cefepime was discontinued  Patient remains afebrile, leukocytosis resolved  Blood culture showed showed no growth to date  Patient will be discharged on clindamycin as patient has sulfa allergy  Patient is encouraged to follow-up with PCP in 1 week and discuss with PCP for possible referral to lymphedema clinic  Patient is clinically and hemodynamically stable to be discharged today per    Please see above list of diagnoses and related plan for additional information  Condition at Discharge: stable     Discharge Day Visit / Exam:     Subjective:  Patient was seen and examined at bedside  The patient stated she feels better    She denies any fever, chills, chest pain, palpitation, shortness of breath, N/V, abd pain  Vitals: Blood Pressure: 94/55 (11/21/21 0700)  Pulse: 60 (11/21/21 0700)  Temperature: (!) 96 2 °F (35 7 °C) (11/21/21 0700)  Temp Source: Temporal (11/21/21 0700)  Respirations: 21 (11/21/21 0700)  Height: 5' 3" (160 cm) (11/18/21 2018)  SpO2: 94 % (11/21/21 0700)  Exam:   Physical Exam  General: Elderly female patient lying in bed, breathing well on room air, no acute distress  HEENT: NC/AT, PERRL, EOM - normal  Neck: Supple  Pulm/Chest: Normal chest wall expansion, clear breath sounds on both side, no wheezing/rhonchi or crackles appreciated  CVS: RRR, normal S1&S2, no murmur appreciated, capillary refill <2s  Abd: soft, non tender, non distended, bowel sounds +  MSK: move all 4 extremities spontaneously, panniculitis and cellulitis over abdomen, bilateral lower extremity  Skin: warm  CNS: no acute focal neuro deficit    Discussion with Family:  Discussed with patient    Discharge instructions/Information to patient and family:   See after visit summary for information provided to patient and family  Provisions for Follow-Up Care:  See after visit summary for information related to follow-up care and any pertinent home health orders  Disposition:     Home with [facility] Services (Reminder: Complete face to face encounter)    For Discharges to [facility]:   · Not Applicable to this Patient - Not Applicable to this Patient    Planned Readmission:  No     Discharge Statement:  I spent 45 minutes discharging the patient  This time was spent on the day of discharge  I had direct contact with the patient on the day of discharge  Greater than 50% of the total time was spent examining patient, answering all patient questions, arranging and discussing plan of care with patient as well as directly providing post-discharge instructions  Additional time then spent on discharge activities      Discharge Medications:  See after visit summary for reconciled discharge medications provided to patient and family        ** Please Note: This note has been constructed using a voice recognition system **

## 2021-11-21 NOTE — ASSESSMENT & PLAN NOTE
· Secondary to cellulitis noted by fever tachycardia and tachypnea, leukocytosis  · Patient is afebrile, leukocytosis improved  · S/p Cefepime 2 g and vancomycin 1750 mg  · ID on board -Continue vancomycin    Discontinue cefepime  · Will be discharged on clindamycin as patient has sulfa allergy  · Caution with fluid secondary to diastolic CHF and significant edema LE/abdomen  · Blood culture - no growth till date

## 2021-11-21 NOTE — DISCHARGE INSTRUCTIONS
Discharge instructions from hospitalist  1  Follow-up with your primary care physician in 1 week in regards to recent hospitalization  Please discuss with PCP for referral to lymphedema clinic  2  Take medications regularly    Changes in medications    Clindamycin 450 mg 3 times a day for 5 days  Apply nystatin powder under skin folds  3  Come back to the ER if symptoms recur or worsen  4  Activity as tolerated  5  Diet :  Heart healthy diet; information packet has more detailed information          Cellulitis   AMBULATORY CARE:   Cellulitis  is a skin infection caused by bacteria  Cellulitis is common and can become severe  Cellulitis usually appears on the lower legs  It can also appear on the arms, face, and other areas  Cellulitis develops when bacteria enter a crack or break in your skin, such as a scratch, bite, or cut  Common signs and symptoms:  Signs and symptoms usually appear on one side of your body  You may have any of the following:  · A fever    · A red, warm, swollen area on your skin    · Pain when the area is touched    · Red spots, bumps, or blisters that may drain pus    · Bumpy, raised skin that feels like an orange peel    Seek care immediately if:   · Your wound gets larger and more painful  · You feel a crackling under your skin when you touch it  · You have purple dots or bumps on your skin, or you see bleeding under your skin  · You see red streaks coming from the infected area  Call your doctor if:   · The red, warm, swollen area gets larger  · Your fever or pain does not go away or gets worse  · The area does not get smaller after 3 days of antibiotics  · You have questions or concerns about your condition or care  Treatment:  You should start to see improvement in 3 days   If your cellulitis is severe, you may need IV antibiotics in the hospital  If cellulitis is not treated, the infection can spread through your body and become life-threatening  You may need any of the following medicines:  · Antibiotics  help treat the bacterial infection  · Acetaminophen  decreases pain and fever  It is available without a doctor's order  Ask how much to take and how often to take it  Follow directions  Read the labels of all other medicines you are using to see if they also contain acetaminophen, or ask your doctor or pharmacist  Acetaminophen can cause liver damage if not taken correctly  Do not use more than 4 grams (4,000 milligrams) total of acetaminophen in one day  · NSAIDs , such as ibuprofen, help decrease swelling, pain, and fever  This medicine is available with or without a doctor's order  NSAIDs can cause stomach bleeding or kidney problems in certain people  If you take blood thinner medicine, always ask your healthcare provider if NSAIDs are safe for you  Always read the medicine label and follow directions  · Take your medicine as directed  Contact your healthcare provider if you think your medicine is not helping or if you have side effects  Tell him or her if you are allergic to any medicine  Keep a list of the medicines, vitamins, and herbs you take  Include the amounts, and when and why you take them  Bring the list or the pill bottles to follow-up visits  Carry your medicine list with you in case of an emergency  Self-care:   · Wash the area with soap and water every day  Gently pat dry  Use bandages if directed by your healthcare provider  · Elevate the area above the level of your heart  as often as you can  This will help decrease swelling and pain  Prop the area on pillows or blankets to keep it elevated comfortably  · Place a cool, damp cloth on the area  Use clean cloths and clean water  You can do this as often as you need to  Cool, damp cloths may help decrease pain  · Apply cream or ointment as directed  These help protect the area   Most over-the-counter products, such as petroleum jelly, are good to use  Ask your healthcare provider about specific creams or ointments you should use  Prevent cellulitis:   · Do not scratch bug bites or areas of injury  You increase your risk for cellulitis by scratching these areas  · Do not share personal items, such as towels, clothing, and razors  · Clean exercise equipment  with germ-killing  before and after you use it  · Treat athlete's foot  This can help prevent the spread of a bacterial skin infection  · Wash your hands often  Use soap and water  Wash your hands after you use the bathroom, change a child's diapers, or sneeze  Wash your hands before you prepare or eat food  Use lotion to prevent dry, cracked skin  Follow up with your doctor within 3 days, or as directed:  He or she will check if your cellulitis is getting better  Write down your questions so you remember to ask them during your visits  © [employer] 2021 Information is for End User's use only and may not be sold, redistributed or otherwise used for commercial purposes  All illustrations and images included in CareNotes® are the copyrighted property of [employer]  or [facility] [place] [facility] [place]   The above information is an  only  It is not intended as medical advice for individual conditions or treatments  Talk to your doctor, nurse or pharmacist before following any medical regimen to see if it is safe and effective for you

## 2021-11-21 NOTE — ASSESSMENT & PLAN NOTE
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

## 2021-11-23 LAB
BACTERIA BLD CULT: NORMAL
BACTERIA BLD CULT: NORMAL

## 2022-01-07 ENCOUNTER — HOSPITAL ENCOUNTER (INPATIENT)
Facility: HOSPITAL | Age: 72
LOS: 3 days | Discharge: HOME HEALTH | DRG: 372 | End: 2022-01-11
Attending: EMERGENCY MEDICINE | Admitting: HOSPITALIST
Payer: MEDICARE

## 2022-01-07 ENCOUNTER — APPOINTMENT (EMERGENCY)
Dept: CT IMAGING | Facility: HOSPITAL | Age: 72
DRG: 372 | End: 2022-01-07
Payer: MEDICARE

## 2022-01-07 DIAGNOSIS — K52.9 COLITIS: Primary | ICD-10-CM

## 2022-01-07 DIAGNOSIS — R26.2 AMBULATORY DYSFUNCTION: ICD-10-CM

## 2022-01-07 DIAGNOSIS — R10.9 ABDOMINAL PAIN: ICD-10-CM

## 2022-01-07 DIAGNOSIS — E66.01 MORBID OBESITY WITH BMI OF 60.0-69.9, ADULT (HCC): Chronic | ICD-10-CM

## 2022-01-07 DIAGNOSIS — A04.72 C. DIFFICILE COLITIS: ICD-10-CM

## 2022-01-07 LAB
ALBUMIN SERPL BCP-MCNC: 2.9 G/DL (ref 3.5–5)
ALP SERPL-CCNC: 123 U/L (ref 34–104)
ALT SERPL W P-5'-P-CCNC: 11 U/L (ref 7–52)
ANION GAP SERPL CALCULATED.3IONS-SCNC: 5 MMOL/L (ref 4–13)
APTT PPP: 53 SECONDS (ref 23–37)
AST SERPL W P-5'-P-CCNC: 18 U/L (ref 13–39)
BASOPHILS # BLD AUTO: 0.04 THOUSANDS/ΜL (ref 0–0.1)
BASOPHILS NFR BLD AUTO: 0 % (ref 0–1)
BILIRUB SERPL-MCNC: 1.13 MG/DL (ref 0.2–1)
BUN SERPL-MCNC: 16 MG/DL (ref 5–25)
CALCIUM ALBUM COR SERPL-MCNC: 9.4 MG/DL (ref 8.3–10.1)
CALCIUM SERPL-MCNC: 8.5 MG/DL (ref 8.4–10.2)
CHLORIDE SERPL-SCNC: 98 MMOL/L (ref 96–108)
CO2 SERPL-SCNC: 32 MMOL/L (ref 21–32)
CREAT SERPL-MCNC: 0.95 MG/DL (ref 0.6–1.3)
EOSINOPHIL # BLD AUTO: 0.28 THOUSAND/ΜL (ref 0–0.61)
EOSINOPHIL NFR BLD AUTO: 3 % (ref 0–6)
ERYTHROCYTE [DISTWIDTH] IN BLOOD BY AUTOMATED COUNT: 16.2 % (ref 11.6–15.1)
GFR SERPL CREATININE-BSD FRML MDRD: 60 ML/MIN/1.73SQ M
GLUCOSE SERPL-MCNC: 110 MG/DL (ref 65–140)
HCT VFR BLD AUTO: 37.2 % (ref 34.8–46.1)
HGB BLD-MCNC: 11.1 G/DL (ref 11.5–15.4)
IMM GRANULOCYTES # BLD AUTO: 0.03 THOUSAND/UL (ref 0–0.2)
IMM GRANULOCYTES NFR BLD AUTO: 0 % (ref 0–2)
INR PPP: 2.1 (ref 0.84–1.19)
LACTATE SERPL-SCNC: 1 MMOL/L (ref 0.5–2)
LIPASE SERPL-CCNC: 23 U/L (ref 11–82)
LYMPHOCYTES # BLD AUTO: 1.1 THOUSANDS/ΜL (ref 0.6–4.47)
LYMPHOCYTES NFR BLD AUTO: 11 % (ref 14–44)
MCH RBC QN AUTO: 25.7 PG (ref 26.8–34.3)
MCHC RBC AUTO-ENTMCNC: 29.8 G/DL (ref 31.4–37.4)
MCV RBC AUTO: 86 FL (ref 82–98)
MONOCYTES # BLD AUTO: 0.56 THOUSAND/ΜL (ref 0.17–1.22)
MONOCYTES NFR BLD AUTO: 6 % (ref 4–12)
NEUTROPHILS # BLD AUTO: 8.14 THOUSANDS/ΜL (ref 1.85–7.62)
NEUTS SEG NFR BLD AUTO: 80 % (ref 43–75)
NRBC BLD AUTO-RTO: 0 /100 WBCS
PLATELET # BLD AUTO: 434 THOUSANDS/UL (ref 149–390)
PMV BLD AUTO: 8.4 FL (ref 8.9–12.7)
POTASSIUM SERPL-SCNC: 3.8 MMOL/L (ref 3.5–5.3)
PROT SERPL-MCNC: 7.3 G/DL (ref 6.4–8.4)
PROTHROMBIN TIME: 23.1 SECONDS (ref 11.6–14.5)
RBC # BLD AUTO: 4.32 MILLION/UL (ref 3.81–5.12)
SODIUM SERPL-SCNC: 135 MMOL/L (ref 135–147)
WBC # BLD AUTO: 10.15 THOUSAND/UL (ref 4.31–10.16)

## 2022-01-07 PROCEDURE — 83605 ASSAY OF LACTIC ACID: CPT | Performed by: EMERGENCY MEDICINE

## 2022-01-07 PROCEDURE — 85730 THROMBOPLASTIN TIME PARTIAL: CPT | Performed by: EMERGENCY MEDICINE

## 2022-01-07 PROCEDURE — 99285 EMERGENCY DEPT VISIT HI MDM: CPT | Performed by: EMERGENCY MEDICINE

## 2022-01-07 PROCEDURE — 96360 HYDRATION IV INFUSION INIT: CPT

## 2022-01-07 PROCEDURE — 99285 EMERGENCY DEPT VISIT HI MDM: CPT

## 2022-01-07 PROCEDURE — 85610 PROTHROMBIN TIME: CPT | Performed by: EMERGENCY MEDICINE

## 2022-01-07 PROCEDURE — 85025 COMPLETE CBC W/AUTO DIFF WBC: CPT | Performed by: EMERGENCY MEDICINE

## 2022-01-07 PROCEDURE — 80053 COMPREHEN METABOLIC PANEL: CPT | Performed by: EMERGENCY MEDICINE

## 2022-01-07 PROCEDURE — 74177 CT ABD & PELVIS W/CONTRAST: CPT

## 2022-01-07 PROCEDURE — 83690 ASSAY OF LIPASE: CPT | Performed by: EMERGENCY MEDICINE

## 2022-01-07 PROCEDURE — 96361 HYDRATE IV INFUSION ADD-ON: CPT

## 2022-01-07 PROCEDURE — 36415 COLL VENOUS BLD VENIPUNCTURE: CPT | Performed by: EMERGENCY MEDICINE

## 2022-01-07 PROCEDURE — 87040 BLOOD CULTURE FOR BACTERIA: CPT | Performed by: EMERGENCY MEDICINE

## 2022-01-07 RX ORDER — METRONIDAZOLE 500 MG/1
500 TABLET ORAL ONCE
Status: COMPLETED | OUTPATIENT
Start: 2022-01-07 | End: 2022-01-07

## 2022-01-07 RX ADMIN — METRONIDAZOLE 500 MG: 500 TABLET ORAL at 21:44

## 2022-01-07 RX ADMIN — SODIUM CHLORIDE 1000 ML: 0.9 INJECTION, SOLUTION INTRAVENOUS at 15:26

## 2022-01-07 RX ADMIN — IOHEXOL 100 ML: 350 INJECTION, SOLUTION INTRAVENOUS at 23:10

## 2022-01-07 NOTE — ED PROVIDER NOTES
History  Chief Complaint   Patient presents with    Abdominal Pain     Patient arrived via EMS  Per patient generalized abd pain, reports nausea and diarrhea for the last two days  81-year-old female presents emergency department complaining of abdominal pain and diarrhea for the past few days  Patient denies any fever chills but admits to generalized weakness  She states that after she eats about 1-1/2 hours afterwards she starts having diarrhea  Patient notes the diarrhea is mucousy and denies any blood  Patient states she has significant abdominal discomfort as well with the right side being more severe  Patient denies any chest pain or shortness of breath or fever  Patient recently had a [employer] around the week of [place] for a dental procedure  Patient has a diaper on lives at home with a 24 hour caregiver  Prior to Admission Medications   Prescriptions Last Dose Informant Patient Reported? Taking?    DULoxetine (CYMBALTA) 20 mg capsule  Self No No   Sig: Take 1 capsule (20 mg total) by mouth daily   acetaminophen (TYLENOL) 650 mg CR tablet  Self Yes No   Sig: Take 650 mg by mouth every 8 (eight) hours   allopurinol (ZYLOPRIM) 100 mg tablet  Self No No   Sig: Take 1 tablet (100 mg total) by mouth daily   furosemide (LASIX) 40 mg tablet  Self No No   Sig: Take 1 tablet (40 mg total) by mouth daily   Patient taking differently: Take 20 mg by mouth daily as needed Prn weight gain 10 pounds    levothyroxine 125 mcg tablet   Yes No   Sig: Take 125 mcg by mouth daily   nystatin (MYCOSTATIN) powder   No No   Sig: Apply topically 2 (two) times a day   triamcinolone (KENALOG) 0 1 % cream   Yes No   Sig: Apply 0 1 application topically 2 (two) times a day   warfarin (COUMADIN) 2 5 mg tablet  Self No No   Sig: Take 1 tablet (2 5 mg total) by mouth daily   Patient taking differently: Take 2 mg by mouth every other day Monday, Wednesday, Friday, sunday    warfarin (COUMADIN) 4 mg tablet  Self No No Sig: Take 1 tablet daily as directed   Patient taking differently: 5 mg every other day Tuesday, Thursday, saturday      Facility-Administered Medications: None       Past Medical History:   Diagnosis Date    Atrial fibrillation ([place] [address] )     Bladder stone     C  difficile colitis     Cellulitis     CHF (congestive heart failure) ([place] [address] )     Depression with anxiety     Disease of thyroid gland     Diverticulitis     Enterocolitis     History of abnormal cervical Pap smear     Kidney stone     Lymphedema     Menopause     Age 53    Morbid obesity with BMI of 70 and over, adult ([place] [address] )     MRSA (methicillin resistant Staphylococcus aureus)     abdominal wound    Osteoarthritis     Phlebitis     left lower leg    Spondylolysis        Past Surgical History:   Procedure Laterality Date    APPENDECTOMY      CARPAL TUNNEL RELEASE      CHOLECYSTECTOMY      CYSTOSCOPY      stent    FOOT SURGERY  1982    bone spur    KNEE SURGERY      WISDOM TOOTH EXTRACTION         Family History   Problem Relation Age of Onset    Heart failure Mother     Heart disease Mother     Lymphoma Mother     Kidney disease Father     Heart disease Father     Heart failure Father     Diabetes type II Father     Diabetes type II Sister     Diabetes type II Brother     Uterine cancer Paternal Aunt     Breast cancer Neg Hx     Colon cancer Neg Hx     Ovarian cancer Neg Hx      I have reviewed and agree with the history as documented      E-Cigarette/Vaping    E-Cigarette Use Never User      E-Cigarette/Vaping Substances    Nicotine No     THC No     CBD No     Flavoring No     Other No     Unknown No      Social History     Tobacco Use    Smoking status: Former Smoker    Smokeless tobacco: Never Used    Tobacco comment: 5 packs/day until 6 (age 16-19) - As per [employer]    Vaping Use    Vaping Use: Never used   Substance Use Topics    Alcohol use: Never    Drug use: Never     Comment: As a teen - As per [employer]        Review of Systems   Constitutional: Negative for chills and fever  HENT: Negative for ear pain and sore throat  Eyes: Negative for pain and visual disturbance  Respiratory: Negative for cough and shortness of breath  Cardiovascular: Negative for chest pain and palpitations  Gastrointestinal: Positive for diarrhea and nausea  Negative for abdominal pain and vomiting  Genitourinary: Negative for dysuria, frequency and hematuria  Musculoskeletal: Negative for arthralgias and back pain  Skin: Negative for color change and rash  Neurological: Negative for seizures and syncope  All other systems reviewed and are negative  Physical Exam  Physical Exam  Vitals and nursing note reviewed  Constitutional:       General: She is not in acute distress  Appearance: Normal appearance  She is well-developed  HENT:      Head: Normocephalic and atraumatic  Right Ear: External ear normal       Left Ear: External ear normal       Nose: Nose normal       Mouth/Throat:      Mouth: Mucous membranes are moist    Eyes:      Conjunctiva/sclera: Conjunctivae normal    Cardiovascular:      Rate and Rhythm: Normal rate and regular rhythm  Pulses: Normal pulses  Heart sounds: Normal heart sounds  No murmur heard  Pulmonary:      Effort: Pulmonary effort is normal  No respiratory distress  Breath sounds: Normal breath sounds  Abdominal:      General: Bowel sounds are normal       Palpations: Abdomen is soft  Tenderness: There is abdominal tenderness in the right lower quadrant, periumbilical area, suprapubic area and left lower quadrant  There is no guarding or rebound  Hernia: No hernia is present  Musculoskeletal:         General: No swelling or deformity  Cervical back: Neck supple  Skin:     General: Skin is warm and dry  Capillary Refill: Capillary refill takes less than 2 seconds  Coloration: Skin is pale     Neurological: General: No focal deficit present  Mental Status: She is alert and oriented to person, place, and time  Mental status is at baseline  Vital Signs  ED Triage Vitals   Temperature Pulse Respirations Blood Pressure SpO2   01/07/22 1420 01/07/22 1420 01/07/22 1420 01/07/22 1420 01/07/22 1420   97 7 °F (36 5 °C) 76 18 102/50 98 %      Temp Source Heart Rate Source Patient Position - Orthostatic VS BP Location FiO2 (%)   01/07/22 1420 01/07/22 1420 01/07/22 1420 01/07/22 1420 --   Temporal Monitor Lying Left arm       Pain Score       01/08/22 0015       8           Vitals:    01/10/22 0731 01/10/22 1543 01/11/22 0804 01/11/22 0807   BP: 91/58 110/51 (!) 112/49 (!) 112/49   Pulse: 70 71 66 67   Patient Position - Orthostatic VS:             Visual Acuity      ED Medications  Medications   sodium chloride 0 9 % bolus 1,000 mL (0 mL Intravenous Stopped 1/7/22 1854)   metroNIDAZOLE (FLAGYL) tablet 500 mg (500 mg Oral Given 1/7/22 2144)   iohexol (OMNIPAQUE) 350 MG/ML injection (SINGLE-DOSE) 100 mL (100 mL Intravenous Given 1/7/22 2310)   potassium chloride (K-DUR,KLOR-CON) CR tablet 40 mEq (40 mEq Oral Given 1/9/22 0925)       Diagnostic Studies  Results Reviewed     Procedure Component Value Units Date/Time    Blood culture #1 [[id number]] Collected: 01/07/22 1520    Lab Status: Final result Specimen: Blood from Arm, Right Updated: 01/12/22 2201     Blood Culture No Growth After 5 Days  Blood culture #2 [[id number]] Collected: 01/07/22 1522    Lab Status: Final result Specimen: Blood from Arm, Right Updated: 01/12/22 2201     Blood Culture No Growth After 5 Days      Clostridium difficile toxin by PCR with EIA [[id number]]  (Abnormal) Collected: 01/08/22 0641    Lab Status: Final result Specimen: Stool from Per Rectum Updated: 01/09/22 1520     C difficile toxin by PCR Positive     C difficile Toxins A+B, EIA Positive    Comprehensive metabolic panel [[id number]]  (Abnormal) Collected: 01/09/22 0545    Lab Status: Final result Specimen: Blood from Arm, Left Updated: 01/09/22 0610     Sodium 136 mmol/L      Potassium 3 6 mmol/L      Chloride 101 mmol/L      CO2 30 mmol/L      ANION GAP 5 mmol/L      BUN 13 mg/dL      Creatinine 0 94 mg/dL      Glucose 95 mg/dL      Calcium 8 0 mg/dL      Corrected Calcium 9 2 mg/dL      AST 21 U/L      ALT 11 U/L      Alkaline Phosphatase 125 U/L      Total Protein 6 3 g/dL      Albumin 2 5 g/dL      Total Bilirubin 0 97 mg/dL      eGFR 61 ml/min/1 73sq m     Narrative:      National Kidney Disease Foundation guidelines for Chronic Kidney Disease (CKD):     Stage 1 with normal or high GFR (GFR > 90 mL/min/1 73 square meters)    Stage 2 Mild CKD (GFR = 60-89 mL/min/1 73 square meters)    Stage 3A Moderate CKD (GFR = 45-59 mL/min/1 73 square meters)    Stage 3B Moderate CKD (GFR = 30-44 mL/min/1 73 square meters)    Stage 4 Severe CKD (GFR = 15-29 mL/min/1 73 square meters)    Stage 5 End Stage CKD (GFR <15 mL/min/1 73 square meters)  Note: GFR calculation is accurate only with a steady state creatinine    CBC and differential [[id number]]  (Abnormal) Collected: 01/09/22 0545    Lab Status: Final result Specimen: Blood from Arm, Left Updated: 01/09/22 0600     WBC 7 90 Thousand/uL      RBC 4 00 Million/uL      Hemoglobin 10 3 g/dL      Hematocrit 34 3 %      MCV 86 fL      MCH 25 8 pg      MCHC 30 0 g/dL      RDW 16 5 %      MPV 8 6 fL      Platelets 395 Thousands/uL      nRBC 0 /100 WBCs      Neutrophils Relative 77 %      Immat GRANS % 0 %      Lymphocytes Relative 11 %      Monocytes Relative 7 %      Eosinophils Relative 4 %      Basophils Relative 1 %      Neutrophils Absolute 6 10 Thousands/µL      Immature Grans Absolute 0 02 Thousand/uL      Lymphocytes Absolute 0 89 Thousands/µL      Monocytes Absolute 0 52 Thousand/µL      Eosinophils Absolute 0 33 Thousand/µL      Basophils Absolute 0 04 Thousands/µL     Procalcitonin with AM Reflex [[id number]]  (Normal) Collected: 01/08/22 1414    Lab Status: Final result Specimen: Blood from Arm, Left Updated: 01/09/22 0227     Procalcitonin 0 07 ng/ml     Basic metabolic panel [[id number]]  (Abnormal) Collected: 01/08/22 0557    Lab Status: Final result Specimen: Blood from Arm, Right Updated: 01/08/22 3273     Sodium 135 mmol/L      Potassium 3 8 mmol/L      Chloride 99 mmol/L      CO2 31 mmol/L      ANION GAP 5 mmol/L      BUN 15 mg/dL      Creatinine 1 00 mg/dL      Glucose 107 mg/dL      Glucose, Fasting 107 mg/dL      Calcium 8 4 mg/dL      eGFR 57 ml/min/1 73sq m     Narrative:      [place] guidelines for Chronic Kidney Disease (CKD):     Stage 1 with normal or high GFR (GFR > 90 mL/min/1 73 square meters)    Stage 2 Mild CKD (GFR = 60-89 mL/min/1 73 square meters)    Stage 3A Moderate CKD (GFR = 45-59 mL/min/1 73 square meters)    Stage 3B Moderate CKD (GFR = 30-44 mL/min/1 73 square meters)    Stage 4 Severe CKD (GFR = 15-29 mL/min/1 73 square meters)    Stage 5 End Stage CKD (GFR <15 mL/min/1 73 square meters)  Note: GFR calculation is accurate only with a steady state creatinine    CBC and differential [[id number]]  (Abnormal) Collected: 01/08/22 0557    Lab Status: Final result Specimen: Blood from Arm, Right Updated: 01/08/22 0610     WBC 12 23 Thousand/uL      RBC 4 30 Million/uL      Hemoglobin 11 1 g/dL      Hematocrit 37 0 %      MCV 86 fL      MCH 25 8 pg      MCHC 30 0 g/dL      RDW 16 5 %      MPV 8 3 fL      Platelets 683 Thousands/uL      nRBC 0 /100 WBCs      Neutrophils Relative 81 %      Immat GRANS % 0 %      Lymphocytes Relative 9 %      Monocytes Relative 6 %      Eosinophils Relative 3 %      Basophils Relative 1 %      Neutrophils Absolute 10 00 Thousands/µL      Immature Grans Absolute 0 03 Thousand/uL      Lymphocytes Absolute 1 08 Thousands/µL      Monocytes Absolute 0 74 Thousand/µL      Eosinophils Absolute 0 32 Thousand/µL      Basophils Absolute 0 06 Thousands/µL Protime-INR [[id number]]  (Abnormal) Collected: 01/07/22 1524    Lab Status: Final result Specimen: Blood from Arm, Right Updated: 01/07/22 1622     Protime 23 1 seconds      INR 2 10    APTT [[id number]]  (Abnormal) Collected: 01/07/22 1524    Lab Status: Final result Specimen: Blood from Arm, Right Updated: 01/07/22 1622     PTT 53 seconds     Comprehensive metabolic panel [[id number]]  (Abnormal) Collected: 01/07/22 1524    Lab Status: Final result Specimen: Blood from Arm, Right Updated: 01/07/22 1559     Sodium 135 mmol/L      Potassium 3 8 mmol/L      Chloride 98 mmol/L      CO2 32 mmol/L      ANION GAP 5 mmol/L      BUN 16 mg/dL      Creatinine 0 95 mg/dL      Glucose 110 mg/dL      Calcium 8 5 mg/dL      Corrected Calcium 9 4 mg/dL      AST 18 U/L      ALT 11 U/L      Alkaline Phosphatase 123 U/L      Total Protein 7 3 g/dL      Albumin 2 9 g/dL      Total Bilirubin 1 13 mg/dL      eGFR 60 ml/min/1 73sq m     Narrative:      [place] guidelines for Chronic Kidney Disease (CKD):     Stage 1 with normal or high GFR (GFR > 90 mL/min/1 73 square meters)    Stage 2 Mild CKD (GFR = 60-89 mL/min/1 73 square meters)    Stage 3A Moderate CKD (GFR = 45-59 mL/min/1 73 square meters)    Stage 3B Moderate CKD (GFR = 30-44 mL/min/1 73 square meters)    Stage 4 Severe CKD (GFR = 15-29 mL/min/1 73 square meters)    Stage 5 End Stage CKD (GFR <15 mL/min/1 73 square meters)  Note: GFR calculation is accurate only with a steady state creatinine    Lactic acid [[id number]]  (Normal) Collected: 01/07/22 1524    Lab Status: Final result Specimen: Blood from Arm, Right Updated: 01/07/22 1559     LACTIC ACID 1 0 mmol/L     Narrative:      Result may be elevated if tourniquet was used during collection      Lipase [[id number]]  (Normal) Collected: 01/07/22 1524    Lab Status: Final result Specimen: Blood from Arm, Right Updated: 01/07/22 1559     Lipase 23 u/L     CBC and differential [[id number]] (Abnormal) Collected: 01/07/22 1524    Lab Status: Final result Specimen: Blood from Arm, Right Updated: 01/07/22 1534     WBC 10 15 Thousand/uL      RBC 4 32 Million/uL      Hemoglobin 11 1 g/dL      Hematocrit 37 2 %      MCV 86 fL      MCH 25 7 pg      MCHC 29 8 g/dL      RDW 16 2 %      MPV 8 4 fL      Platelets 620 Thousands/uL      nRBC 0 /100 WBCs      Neutrophils Relative 80 %      Immat GRANS % 0 %      Lymphocytes Relative 11 %      Monocytes Relative 6 %      Eosinophils Relative 3 %      Basophils Relative 0 %      Neutrophils Absolute 8 14 Thousands/µL      Immature Grans Absolute 0 03 Thousand/uL      Lymphocytes Absolute 1 10 Thousands/µL      Monocytes Absolute 0 56 Thousand/µL      Eosinophils Absolute 0 28 Thousand/µL      Basophils Absolute 0 04 Thousands/µL                  CT abdomen pelvis with contrast   Final Result by Jeovany Gómez DO (01/07 2956)      Limited study due to patient body habitus  Thickening and inflammatory changes around the sigmoid colon which may represent colitis (infection versus inflammatory cannot rule out ischemia)      The study was marked in EPIC for immediate notification               Workstation performed: [id number]                    Procedures  Procedures         ED Course  ED Course as of 01/21/22 1134   Fri Jan 07, 2022   1619 Alkaline Phosphatase(!): 123   1906 Will attempt to transfer patient [facility] for bariatric CT being that the patient cannot fit into our CT scanner at [facility]  2055 Transfer has been initiated in to go to [facility] for [facility] [address] Batista [id number] Case signed out to Dr Mitzi Wright pending CT scan and returned from [facility]  SBIRT 20yo+      Most Recent Value   SBIRT (22 yo +)    In order to provide better care to our patients, we are screening all of our patients for alcohol and drug use  Would it be okay to ask you these screening questions?  Yes Filed at: 01/07/2022 1525   Initial Alcohol Screen: US AUDIT-C     1  How often do you have a drink containing alcohol? 0 Filed at: 01/07/2022 1528   2  How many drinks containing alcohol do you have on a typical day you are drinking? 0 Filed at: 01/07/2022 1528   3a  Male UNDER 65: How often do you have five or more drinks on one occasion? 0 Filed at: 01/07/2022 1528   3b  FEMALE Any Age, or MALE 65+: How often do you have 4 or more drinks on one occassion? 0 Filed at: 01/07/2022 1528   Audit-C Score 0 Filed at: 01/07/2022 1528   JYOTI: How many times in the past year have you    Used an illegal drug or used a prescription medication for non-medical reasons? Never Filed at: 01/07/2022 1528                    MDM    Disposition  Final diagnoses:   Colitis   Abdominal pain     Time reflects when diagnosis was documented in both MDM as applicable and the Disposition within this note     Time User Action Codes Description Comment    1/8/2022 12:00 AM Laura Dumont Add [K52 9] Colitis     1/8/2022 12:34 AM Desiree Cole Add [R10 9] Abdominal pain     1/11/2022 10:54 AM Voncile Emilie Add [A04 72] C  difficile colitis     1/11/2022 10:54 AM Voncile Emilie Add [R26 2] Ambulatory dysfunction     1/11/2022 10:54 AM Voncile Emilie Add [E66 01,  Z68 44] Morbid obesity with BMI of 60 0-69 9, adult [facility])       ED Disposition     ED Disposition Condition Date/Time Comment    Admit Stable Sat Jan 8, 2022 12:34 AM Case was discussed with BRITANY and the patient's admission status was agreed to be Admission Status: observation status to the service of Dr Gt Gray MD Documentation      Most Recent Value   Transported by (Company and Unit #) [id number] [facility]      RN Documentation      Most Recent Value   Transported by [employer] and Unit #) [id number] [facility]      Follow-up Information     Follow up With Specialties Details Why Contact Info    [address]  Follow up A representative from [address] [facility] [facility] will contact you to resume services   [address] [facility] [address] [place] [address], [address] [facility], Nurse Practitioner Follow up Please follow-up with your PCP within 7-10 days  [address]      Edgar Romero MD Gastroenterology Follow up Please call within 1 week to schedule a follow-up appointment  [address]  [phone]            Discharge Medication List as of 1/11/2022 11:17 AM      START taking these medications    Details   vancomycin (VANCOCIN) 125 MG capsule Take 1 capsule (125 mg total) by mouth 4 (four) times a day for 8 days, Starting Tue 1/11/2022, Until Wed 1/19/2022, Normal         CONTINUE these medications which have NOT CHANGED    Details   acetaminophen (TYLENOL) 650 mg CR tablet Take 650 mg by mouth every 8 (eight) hours, Historical Med      allopurinol (ZYLOPRIM) 100 mg tablet Take 1 tablet (100 mg total) by mouth daily, Starting Mon 9/30/2019, Normal      DULoxetine (CYMBALTA) 20 mg capsule Take 1 capsule (20 mg total) by mouth daily, Starting Wed 8/15/2018, Normal      furosemide (LASIX) 40 mg tablet Take 1 tablet (40 mg total) by mouth daily, Starting Fri 8/23/2019, Normal      levothyroxine 125 mcg tablet Take 125 mcg by mouth daily, Historical Med      nystatin (MYCOSTATIN) powder Apply topically 2 (two) times a day, Starting Sun 11/21/2021, Normal      triamcinolone (KENALOG) 0 1 % cream Apply 0 1 application topically 2 (two) times a day, Starting Thu 12/16/2021, Historical Med      !! warfarin (COUMADIN) 2 5 mg tablet Take 1 tablet (2 5 mg total) by mouth daily, Starting Thu 1/2/2020, Normal      !! warfarin (COUMADIN) 4 mg tablet Take 1 tablet daily as directed, Normal       !! - Potential duplicate medications found  Please discuss with provider  Outpatient Discharge Orders   Ambulatory Referral to Home Health   Standing Status: Future Standing Exp   Date: 01/11/23      Ambulatory referral to Gastroenterology   Standing Status: Future Standing Exp   Date: 01/11/23      Discharge Diet     Activity as tolerated     Call provider for:  persistent nausea or vomiting     Call provider for:  persistent dizziness or light-headedness       PDMP Review     None          ED Provider  Electronically Signed by           Vinicius Haynes DO  01/08/22 0000       Vinicius Haynes DO  01/21/22 1134

## 2022-01-08 PROBLEM — K52.9 COLITIS: Status: ACTIVE | Noted: 2022-01-08

## 2022-01-08 PROBLEM — L03.90 CELLULITIS: Status: RESOLVED | Noted: 2020-10-03 | Resolved: 2022-01-08

## 2022-01-08 PROBLEM — N83.202 LEFT OVARIAN CYST: Status: ACTIVE | Noted: 2017-09-20

## 2022-01-08 PROBLEM — R19.7 DIARRHEA OF PRESUMED INFECTIOUS ORIGIN: Status: ACTIVE | Noted: 2022-01-08

## 2022-01-08 PROBLEM — N20.1 CALCULUS OF URETER: Status: ACTIVE | Noted: 2017-02-06

## 2022-01-08 PROBLEM — R60.0 BILATERAL LOWER EXTREMITY EDEMA: Status: ACTIVE | Noted: 2017-07-11

## 2022-01-08 PROBLEM — Z86.14 HX MRSA INFECTION: Status: ACTIVE | Noted: 2020-07-16

## 2022-01-08 PROBLEM — G89.4 CHRONIC PAIN DISORDER: Status: ACTIVE | Noted: 2017-07-11

## 2022-01-08 PROBLEM — F41.8 DEPRESSION WITH ANXIETY: Status: ACTIVE | Noted: 2017-07-15

## 2022-01-08 LAB
ANION GAP SERPL CALCULATED.3IONS-SCNC: 5 MMOL/L (ref 4–13)
BASOPHILS # BLD AUTO: 0.06 THOUSANDS/ΜL (ref 0–0.1)
BASOPHILS NFR BLD AUTO: 1 % (ref 0–1)
BUN SERPL-MCNC: 15 MG/DL (ref 5–25)
CALCIUM SERPL-MCNC: 8.4 MG/DL (ref 8.4–10.2)
CHLORIDE SERPL-SCNC: 99 MMOL/L (ref 96–108)
CO2 SERPL-SCNC: 31 MMOL/L (ref 21–32)
CREAT SERPL-MCNC: 1 MG/DL (ref 0.6–1.3)
EOSINOPHIL # BLD AUTO: 0.32 THOUSAND/ΜL (ref 0–0.61)
EOSINOPHIL NFR BLD AUTO: 3 % (ref 0–6)
ERYTHROCYTE [DISTWIDTH] IN BLOOD BY AUTOMATED COUNT: 16.5 % (ref 11.6–15.1)
GFR SERPL CREATININE-BSD FRML MDRD: 57 ML/MIN/1.73SQ M
GLUCOSE P FAST SERPL-MCNC: 107 MG/DL (ref 65–99)
GLUCOSE SERPL-MCNC: 107 MG/DL (ref 65–140)
HCT VFR BLD AUTO: 37 % (ref 34.8–46.1)
HGB BLD-MCNC: 11.1 G/DL (ref 11.5–15.4)
IMM GRANULOCYTES # BLD AUTO: 0.03 THOUSAND/UL (ref 0–0.2)
IMM GRANULOCYTES NFR BLD AUTO: 0 % (ref 0–2)
LYMPHOCYTES # BLD AUTO: 1.08 THOUSANDS/ΜL (ref 0.6–4.47)
LYMPHOCYTES NFR BLD AUTO: 9 % (ref 14–44)
MCH RBC QN AUTO: 25.8 PG (ref 26.8–34.3)
MCHC RBC AUTO-ENTMCNC: 30 G/DL (ref 31.4–37.4)
MCV RBC AUTO: 86 FL (ref 82–98)
MONOCYTES # BLD AUTO: 0.74 THOUSAND/ΜL (ref 0.17–1.22)
MONOCYTES NFR BLD AUTO: 6 % (ref 4–12)
NEUTROPHILS # BLD AUTO: 10 THOUSANDS/ΜL (ref 1.85–7.62)
NEUTS SEG NFR BLD AUTO: 81 % (ref 43–75)
NRBC BLD AUTO-RTO: 0 /100 WBCS
PLATELET # BLD AUTO: 407 THOUSANDS/UL (ref 149–390)
PMV BLD AUTO: 8.3 FL (ref 8.9–12.7)
POTASSIUM SERPL-SCNC: 3.8 MMOL/L (ref 3.5–5.3)
RBC # BLD AUTO: 4.3 MILLION/UL (ref 3.81–5.12)
SODIUM SERPL-SCNC: 135 MMOL/L (ref 135–147)
WBC # BLD AUTO: 12.23 THOUSAND/UL (ref 4.31–10.16)

## 2022-01-08 PROCEDURE — 80048 BASIC METABOLIC PNL TOTAL CA: CPT | Performed by: PHYSICIAN ASSISTANT

## 2022-01-08 PROCEDURE — 85025 COMPLETE CBC W/AUTO DIFF WBC: CPT | Performed by: PHYSICIAN ASSISTANT

## 2022-01-08 PROCEDURE — 87493 C DIFF AMPLIFIED PROBE: CPT | Performed by: EMERGENCY MEDICINE

## 2022-01-08 PROCEDURE — 99220 PR INITIAL OBSERVATION CARE/DAY 70 MINUTES: CPT | Performed by: HOSPITALIST

## 2022-01-08 PROCEDURE — 84145 PROCALCITONIN (PCT): CPT | Performed by: HOSPITALIST

## 2022-01-08 PROCEDURE — 36415 COLL VENOUS BLD VENIPUNCTURE: CPT | Performed by: PHYSICIAN ASSISTANT

## 2022-01-08 RX ORDER — TRIAMCINOLONE ACETONIDE 1 MG/G
CREAM TOPICAL 2 TIMES DAILY
Status: DISCONTINUED | OUTPATIENT
Start: 2022-01-08 | End: 2022-01-11 | Stop reason: HOSPADM

## 2022-01-08 RX ORDER — CIPROFLOXACIN 2 MG/ML
400 INJECTION, SOLUTION INTRAVENOUS EVERY 12 HOURS
Status: DISCONTINUED | OUTPATIENT
Start: 2022-01-08 | End: 2022-01-09

## 2022-01-08 RX ORDER — DULOXETIN HYDROCHLORIDE 20 MG/1
20 CAPSULE, DELAYED RELEASE ORAL DAILY
Status: DISCONTINUED | OUTPATIENT
Start: 2022-01-08 | End: 2022-01-11 | Stop reason: HOSPADM

## 2022-01-08 RX ORDER — ONDANSETRON 2 MG/ML
4 INJECTION INTRAMUSCULAR; INTRAVENOUS EVERY 6 HOURS PRN
Status: DISCONTINUED | OUTPATIENT
Start: 2022-01-08 | End: 2022-01-11 | Stop reason: HOSPADM

## 2022-01-08 RX ORDER — METRONIDAZOLE 500 MG/1
500 TABLET ORAL EVERY 8 HOURS SCHEDULED
Status: DISCONTINUED | OUTPATIENT
Start: 2022-01-08 | End: 2022-01-09

## 2022-01-08 RX ORDER — SODIUM CHLORIDE 9 MG/ML
125 INJECTION, SOLUTION INTRAVENOUS CONTINUOUS
Status: DISCONTINUED | OUTPATIENT
Start: 2022-01-08 | End: 2022-01-09

## 2022-01-08 RX ORDER — ALLOPURINOL 100 MG/1
100 TABLET ORAL DAILY
Status: DISCONTINUED | OUTPATIENT
Start: 2022-01-08 | End: 2022-01-11 | Stop reason: HOSPADM

## 2022-01-08 RX ORDER — ACETAMINOPHEN 325 MG/1
650 TABLET ORAL EVERY 6 HOURS PRN
Status: DISCONTINUED | OUTPATIENT
Start: 2022-01-08 | End: 2022-01-11 | Stop reason: HOSPADM

## 2022-01-08 RX ORDER — WARFARIN SODIUM 2 MG/1
2 TABLET ORAL
Status: DISCONTINUED | OUTPATIENT
Start: 2022-01-09 | End: 2022-01-11 | Stop reason: HOSPADM

## 2022-01-08 RX ORDER — WARFARIN SODIUM 5 MG/1
5 TABLET ORAL
Status: DISCONTINUED | OUTPATIENT
Start: 2022-01-08 | End: 2022-01-11 | Stop reason: HOSPADM

## 2022-01-08 RX ORDER — TRIAMCINOLONE ACETONIDE 1 MG/G
0.1 CREAM TOPICAL 2 TIMES DAILY
COMMUNITY
Start: 2021-12-16

## 2022-01-08 RX ORDER — NYSTATIN 100000 [USP'U]/G
POWDER TOPICAL 2 TIMES DAILY
Status: DISCONTINUED | OUTPATIENT
Start: 2022-01-08 | End: 2022-01-08

## 2022-01-08 RX ADMIN — LEVOTHYROXINE SODIUM 125 MCG: 25 TABLET ORAL at 05:59

## 2022-01-08 RX ADMIN — SODIUM CHLORIDE 125 ML/HR: 0.9 INJECTION, SOLUTION INTRAVENOUS at 01:39

## 2022-01-08 RX ADMIN — NYSTATIN: 100000 POWDER TOPICAL at 08:32

## 2022-01-08 RX ADMIN — METRONIDAZOLE 500 MG: 500 TABLET ORAL at 21:51

## 2022-01-08 RX ADMIN — SODIUM CHLORIDE 125 ML/HR: 0.9 INJECTION, SOLUTION INTRAVENOUS at 16:55

## 2022-01-08 RX ADMIN — WARFARIN SODIUM 5 MG: 5 TABLET ORAL at 17:25

## 2022-01-08 RX ADMIN — CIPROFLOXACIN 400 MG: 2 INJECTION, SOLUTION INTRAVENOUS at 16:55

## 2022-01-08 RX ADMIN — METRONIDAZOLE 500 MG: 500 TABLET ORAL at 16:55

## 2022-01-08 RX ADMIN — DULOXETINE HYDROCHLORIDE 20 MG: 20 CAPSULE, DELAYED RELEASE ORAL at 08:32

## 2022-01-08 RX ADMIN — TRIAMCINOLONE ACETONIDE: 1 CREAM TOPICAL at 21:56

## 2022-01-08 RX ADMIN — ALLOPURINOL 100 MG: 100 TABLET ORAL at 08:32

## 2022-01-08 NOTE — PLAN OF CARE
Problem: Potential for Falls  Goal: Patient will remain free of falls  Description: INTERVENTIONS:  - Educate patient/family on patient safety including physical limitations  - Instruct patient to call for assistance with activity   - Consult OT/PT to assist with strengthening/mobility   - Keep Call bell within reach  - Keep bed low and locked with side rails adjusted as appropriate  - Keep care items and personal belongings within reach  - Initiate and maintain comfort rounds  - Make Fall Risk Sign visible to staff  - Offer Toileting every 1 Hours, in advance of need  - Initiate/Maintain bed alarm  - Obtain necessary fall risk management equipment: bed   - Apply yellow socks and bracelet for high fall risk patients  - Consider moving patient to room near nurses station  1/8/2022 1852 by Moris Carrillo RN  Outcome: Progressing  1/8/2022 1852 by Moris Carrillo RN  Outcome: Progressing     Problem: MOBILITY - ADULT  Goal: Maintain or return to baseline ADL function  Description: INTERVENTIONS:  -  Assess patient's ability to carry out ADLs; assess patient's baseline for ADL function and identify physical deficits which impact ability to perform ADLs (bathing, care of mouth/teeth, toileting, grooming, dressing, etc )  - Assess/evaluate cause of self-care deficits   - Assess range of motion  - Assess patient's mobility; develop plan if impaired  - Assess patient's need for assistive devices and provide as appropriate  - Encourage maximum independence but intervene and supervise when necessary  - Involve family in performance of ADLs  - Assess for home care needs following discharge   - Consider OT consult to assist with ADL evaluation and planning for discharge  - Provide patient education as appropriate  1/8/2022 1852 by Moris Carrillo RN  Outcome: Progressing  1/8/2022 1852 by Moris Carrillo RN  Outcome: Progressing  Goal: Maintains/Returns to pre admission functional level  Description: INTERVENTIONS:  - Perform BMAT or MOVE assessment daily    - Set and communicate daily mobility goal to care team and patient/family/caregiver  - Collaborate with rehabilitation services on mobility goals if consulted  - Perform Range of Motion 3 times a day  - Reposition patient every 2 hours    - Dangle patient 3 times a day  - Stand patient 3 times a day  - Ambulate patient 3 times a day  - Out of bed to chair 3 times a day   - Out of bed for meals 3 times a day  - Out of bed for toileting  - Record patient progress and toleration of activity level   1/8/2022 1852 by Emile Cano RN  Outcome: Progressing  1/8/2022 1852 by Emile Cano RN  Outcome: Progressing     Problem: Prexisting or High Potential for Compromised Skin Integrity  Goal: Skin integrity is maintained or improved  Description: INTERVENTIONS:  - Identify patients at risk for skin breakdown  - Assess and monitor skin integrity  - Assess and monitor nutrition and hydration status  - Monitor labs   - Assess for incontinence   - Turn and reposition patient  - Assist with mobility/ambulation  - Relieve pressure over bony prominences  - Avoid friction and shearing  - Provide appropriate hygiene as needed including keeping skin clean and dry  - Evaluate need for skin moisturizer/barrier cream  - Collaborate with interdisciplinary team   - Patient/family teaching  - Consider wound care consult   1/8/2022 1852 by Emile Cano RN  Outcome: Progressing  1/8/2022 1852 by Emile Cano RN  Outcome: Progressing     Problem: PAIN - ADULT  Goal: Verbalizes/displays adequate comfort level or baseline comfort level  Description: Interventions:  - Encourage patient to monitor pain and request assistance  - Assess pain using appropriate pain scale  - Administer analgesics based on type and severity of pain and evaluate response  - Implement non-pharmacological measures as appropriate and evaluate response  - Consider cultural and social influences on pain and pain management  - Notify physician/advanced practitioner if interventions unsuccessful or patient reports new pain  Outcome: Progressing     Problem: INFECTION - ADULT  Goal: Absence or prevention of progression during hospitalization  Description: INTERVENTIONS:  - Assess and monitor for signs and symptoms of infection  - Monitor lab/diagnostic results  - Monitor all insertion sites, i e  indwelling lines, tubes, and drains  - Monitor endotracheal if appropriate and nasal secretions for changes in amount and color  - [place] appropriate cooling/warming therapies per order  - Administer medications as ordered  - Instruct and encourage patient and family to use good hand hygiene technique  - Identify and instruct in appropriate isolation precautions for identified infection/condition  Outcome: Progressing  Goal: Absence of fever/infection during neutropenic period  Description: INTERVENTIONS:  - Monitor WBC    Outcome: Progressing     Problem: SAFETY ADULT  Goal: Patient will remain free of falls  Description: INTERVENTIONS:  - Educate patient/family on patient safety including physical limitations  - Instruct patient to call for assistance with activity   - Consult OT/PT to assist with strengthening/mobility   - Keep Call bell within reach  - Keep bed low and locked with side rails adjusted as appropriate  - Keep care items and personal belongings within reach  - Initiate and maintain comfort rounds  - Make Fall Risk Sign visible to staff  - Offer Toileting every 1 Hours, in advance of need  - Initiate/Maintain bed alarm  - Obtain necessary fall risk management equipment: bed   - Apply yellow socks and bracelet for high fall risk patients  - Consider moving patient to room near nurses station  1/8/2022 1852 by Adam Rivera RN  Outcome: Progressing  1/8/2022 1852 by Adam Rivrea RN  Outcome: Progressing  Goal: Maintain or return to baseline ADL function  Description: INTERVENTIONS:  -  Assess patient's ability to carry out ADLs; assess patient's baseline for ADL function and identify physical deficits which impact ability to perform ADLs (bathing, care of mouth/teeth, toileting, grooming, dressing, etc )  - Assess/evaluate cause of self-care deficits   - Assess range of motion  - Assess patient's mobility; develop plan if impaired  - Assess patient's need for assistive devices and provide as appropriate  - Encourage maximum independence but intervene and supervise when necessary  - Involve family in performance of ADLs  - Assess for home care needs following discharge   - Consider OT consult to assist with ADL evaluation and planning for discharge  - Provide patient education as appropriate  1/8/2022 1852 by Rajendra Banuelos RN  Outcome: Progressing  1/8/2022 1852 by Rajendra Banuelos RN  Outcome: Progressing  Goal: Maintains/Returns to pre admission functional level  Description: INTERVENTIONS:  - Perform BMAT or MOVE assessment daily    - Set and communicate daily mobility goal to care team and patient/family/caregiver  - Collaborate with rehabilitation services on mobility goals if consulted  - Perform Range of Motion 3 times a day  - Reposition patient every 2 hours    - Dangle patient 3 times a day  - Stand patient 3 times a day  - Ambulate patient 3 times a day  - Out of bed to chair 3 times a day   - Out of bed for meals 3 times a day  - Out of bed for toileting  - Record patient progress and toleration of activity level   1/8/2022 1852 by Rajendra Banuelos RN  Outcome: Progressing  1/8/2022 1852 by Rajendra Banuelos RN  Outcome: Progressing     Problem: DISCHARGE PLANNING  Goal: Discharge to home or other facility with appropriate resources  Description: INTERVENTIONS:  - Identify barriers to discharge w/patient and caregiver  - Arrange for needed discharge resources and transportation as appropriate  - Identify discharge learning needs (meds, wound care, etc )  - Arrange for interpretive services to assist at discharge as needed  - Refer to Case Management Department for coordinating discharge planning if the patient needs post-hospital services based on physician/advanced practitioner order or complex needs related to functional status, cognitive ability, or social support system  Outcome: Progressing     Problem: Knowledge Deficit  Goal: Patient/family/caregiver demonstrates understanding of disease process, treatment plan, medications, and discharge instructions  Description: Complete learning assessment and assess knowledge base    Interventions:  - Provide teaching at level of understanding  - Provide teaching via preferred learning methods  Outcome: Progressing

## 2022-01-08 NOTE — ASSESSMENT & PLAN NOTE
Wt Readings from Last 3 Encounters:   11/18/21 (!) 172 kg (380 lb)   10/03/20 (!) 210 kg (462 lb 15 5 oz)   09/28/20 (!) 191 kg (422 lb)     Place on no added salt diet once diet is advanced, obtain daily weights

## 2022-01-08 NOTE — H&P
[facility]  H&P- Vel Farfan 1950, 80 y o  female MRN: [id number]  Unit/Bed#: ED 02 Encounter: [id number]  Primary Care Provider: ORACIO Rasmussen   Date and time admitted to hospital: 1/7/2022  2:19 PM    * Colitis  Assessment & Plan  · Placed in observation Medicine  · Placed on clear liquid diet  · Retained stool for stool studies and C diff  · Placed on contact precautions  · Patient received Cipro and Flagyl in the ER, will hold off on further antibiotics per pending procalcitonin in a m  · Consult GI    Diarrhea of presumed infectious origin  Assessment & Plan  · Being treated as per above  · Patient has history of C diff in 2007 as well as irritable bowel  · Await further input from GI    Renal insufficiency  Assessment & Plan  Creatinine appears to be baseline, will continue to monitor with repeat labs in a m      Chronic diastolic congestive heart failure (HCC)  Assessment & Plan  Wt Readings from Last 3 Encounters:   11/18/21 (!) 172 kg (380 lb)   10/03/20 (!) 210 kg (462 lb 15 5 oz)   09/28/20 (!) 191 kg (422 lb)     Place on no added salt diet once diet is advanced, obtain daily weights        Morbid obesity with BMI of 60 0-69 9, adult ([facility] [address] )  Assessment & Plan  [facility] weight loss and supportive care    Idiopathic chronic 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  Assessment & Plan  Continue periosteal allopurinol 100 mg p o  daily    Mild episode of recurrent major depressive disorder (HCC)  Assessment & Plan  Continue pre-hospital Cymbalta 20 mg p o  daily    Other specified hypothyroidism  Assessment & Plan  Continue pre-hospital levothyroxine 125 mcg p o  daily    Paroxysmal atrial fibrillation (HCC)  Assessment & Plan  Continue pre-hospital Coumadin 2 mg alternating with 5 mg      VTE Prophylaxis: Heparin  Code Status:  Level 1  POLST: There is no POLST form on file for this patient (pre-hospital)  Discussion with family:  None present at bedside at time of exam    Anticipated Length of Stay:  Patient will be admitted on an Observation basis with an anticipated length of stay of  < 2 midnights  Justification for Hospital Stay:  Colitis and diarrhea requiring further GI evaluation    Chief Complaint:   Diarrhea x1 week and abdominal pain x2 days    History of Present Illness:    Scarlett Berry is a 80 y o  female who presents with diarrhea x1 week and abdominal pain x2 days  The patient has a history of irritable bowel syndrome and is not currently undergoing any treatment as a remote history of C diff colitis in 2007 who presents here for further evaluation treatment of her one-week history of multiple watery bowel movements per day  Today patient started with crampy diffuse abdominal pain located in the right side with radiation in the epigastric region  Patient does state that she was recently on a course of antibiotics approximately 3 weeks ago she had Zithromax following a dental procedure  Patient denies any fever chills, no vomiting but does complain of some nausea, no bright red blood per rectum or dark stools  Patient states immediately after she eats approximately 0 5 hours afterward chest to have a bowel movement which is watery diarrhea accompanied with mucus  Review of Systems:  Review of Systems   Constitutional: Negative for chills and fever  HENT: Negative for congestion and sore throat  Respiratory: Negative for cough, shortness of breath and wheezing  Cardiovascular: Negative for chest pain and palpitations  Gastrointestinal: Positive for abdominal pain, diarrhea and nausea  Negative for vomiting  Genitourinary: Negative for dysuria, frequency, hematuria and urgency  Musculoskeletal: Negative for arthralgias and myalgias  Skin: Negative for wound  Neurological: Negative for dizziness, syncope, light-headedness and headaches  All other systems reviewed and are negative        Past Medical and Surgical History:   Past Medical History:   Diagnosis Date    Atrial fibrillation ([facility] )     Bladder stone     C  difficile colitis     Cellulitis     CHF (congestive heart failure) (HCC)     Depression with anxiety     Disease of thyroid gland     Diverticulitis     Enterocolitis     History of abnormal cervical Pap smear     Kidney stone     Lymphedema     Menopause     Age 53    Morbid obesity with BMI of 70 and over, adult ([facility] )     MRSA (methicillin resistant Staphylococcus aureus)     abdominal wound    Osteoarthritis     Phlebitis     left lower leg    Spondylolysis        Past Surgical History:   Procedure Laterality Date    APPENDECTOMY      CARPAL TUNNEL RELEASE      CHOLECYSTECTOMY      CYSTOSCOPY      stent    FOOT SURGERY  1982    bone spur    KNEE SURGERY      WISDOM TOOTH EXTRACTION         Meds/Allergies:  Prior to Admission medications    Medication Sig Start Date End Date Taking?  Authorizing Provider   acetaminophen (TYLENOL) 650 mg CR tablet Take 650 mg by mouth every 8 (eight) hours    Historical Provider, MD   allopurinol (ZYLOPRIM) 100 mg tablet Take 1 tablet (100 mg total) by mouth daily 9/30/19   Oly Ram DO   DULoxetine (CYMBALTA) 20 mg capsule Take 1 capsule (20 mg total) by mouth daily 8/15/18   Oly Ram DO   furosemide (LASIX) 40 mg tablet Take 1 tablet (40 mg total) by mouth daily  Patient taking differently: Take 20 mg by mouth daily as needed Prn weight gain 10 pounds  8/23/19   Oly Ram DO   levothyroxine 125 mcg tablet Take 125 mcg by mouth daily    Historical Provider, MD   nystatin (MYCOSTATIN) powder Apply topically 2 (two) times a day 11/21/21   Slade Hargrove MD   warfarin (COUMADIN) 2 5 mg tablet Take 1 tablet (2 5 mg total) by mouth daily  Patient taking differently: Take 2 mg by mouth every other day Monday, Wednesday, Friday, sunday 1/2/20   Oly Ram DO   warfarin (COUMADIN) 4 mg tablet Take 1 tablet daily as directed  Patient taking differently: 5 mg every other day Tuesday, Thursday, saturday 4/26/18   Elif Powell DO     I have reviewed home medications with patient personally  Allergies: Allergies   Allergen Reactions    Augmentin Es-600  [Amoxicillin-Pot Clavulanate]     Penicillins Other (See Comments)     Tolerates cefepime (11/18/21)    Sulfa Antibiotics Hives    Vitamin C [Ascorbate - Food Allergy]     Latex Rash and Edema       Social History:  Marital Status: Single   Occupation:  Retired LPN  Patient Pre-hospital Living Situation:  Resides at home with caregiver for 12 hours a day  Patient Pre-hospital Level of Mobility:  Bed ridden  Patient Pre-hospital Diet Restrictions:  No added salt  Substance Use History:   Social History     Substance and Sexual Activity   Alcohol Use Never     Social History     Tobacco Use   Smoking Status Former Smoker   Smokeless Tobacco Never Used   Tobacco Comment    5 packs/day until 6 (age [age]) - As per [employer]      Social History     Substance and Sexual Activity   Drug Use Never    Comment: As a teen - As per [employer]        Family History:  I have reviewed the patients family history    Physical Exam:   Vitals:   Blood Pressure: 113/56 (01/08/22 0015)  Pulse: 71 (01/08/22 0015)  Temperature: 97 7 °F (36 5 °C) (01/07/22 1420)  Temp Source: Temporal (01/07/22 1420)  Respirations: 18 (01/08/22 0015)  SpO2: 97 % (01/08/22 0015)    Physical Exam  Vitals and nursing note reviewed  Constitutional:       General: She is not in acute distress  Appearance: Normal appearance  She is obese  HENT:      Head: Normocephalic and atraumatic  Right Ear: Tympanic membrane normal       Left Ear: Tympanic membrane normal       Nose: Nose normal       Mouth/Throat:      Mouth: Mucous membranes are moist       Pharynx: Oropharynx is clear  No posterior oropharyngeal erythema  Eyes:      Extraocular Movements: Extraocular movements intact        Conjunctiva/sclera: Conjunctivae normal  Pupils: Pupils are equal, round, and reactive to light  Cardiovascular:      Rate and Rhythm: Normal rate and regular rhythm  Pulses: Normal pulses  Heart sounds: Normal heart sounds  No murmur heard  Pulmonary:      Effort: Pulmonary effort is normal  No respiratory distress  Breath sounds: Decreased breath sounds present  No wheezing, rhonchi or rales  Abdominal:      General: Bowel sounds are normal       Palpations: Abdomen is soft  Tenderness: There is generalized abdominal tenderness  Musculoskeletal:      Cervical back: Normal range of motion and neck supple  No muscular tenderness  Right lower leg: Edema present  Left lower leg: Edema present  Skin:     General: Skin is warm and dry  Capillary Refill: Capillary refill takes less than 2 seconds  Neurological:      General: No focal deficit present  Mental Status: She is alert and oriented to person, place, and time  Additional Data:   Lab Results: I have personally reviewed pertinent reports  Results from last 7 days   Lab Units 01/07/22  1524   WBC Thousand/uL 10 15   HEMOGLOBIN g/dL 11 1*   HEMATOCRIT % 37 2   PLATELETS Thousands/uL 434*   NEUTROS PCT % 80*   LYMPHS PCT % 11*   MONOS PCT % 6   EOS PCT % 3     Results from last 7 days   Lab Units 01/07/22  1524   SODIUM mmol/L 135   POTASSIUM mmol/L 3 8   CHLORIDE mmol/L 98   CO2 mmol/L 32   BUN mg/dL 16   CREATININE mg/dL 0 95   CALCIUM mg/dL 8 5   ALK PHOS U/L 123*   ALT U/L 11   AST U/L 18     Results from last 7 days   Lab Units 01/07/22  1524   INR  2 10*               Imaging: I have personally reviewed pertinent reports  CT abdomen pelvis with contrast   Final Result by Mely Hill DO (01/07 8915)      Limited study due to patient body habitus        Thickening and inflammatory changes around the sigmoid colon which may represent colitis (infection versus inflammatory cannot rule out ischemia)      The study was marked in EPIC for immediate notification               Workstation performed: [id number]             EKG, Pathology, and Other Studies Reviewed on Admission:   · EKG: N/A    Epic Records Reviewed: Yes     ** Please Note: This note has been constructed using a voice recognition system   **

## 2022-01-08 NOTE — ASSESSMENT & PLAN NOTE
· Being treated as per above  · Patient has history of C diff in 2007 as well as irritable bowel  · Await further input from GI

## 2022-01-08 NOTE — ASSESSMENT & PLAN NOTE
· Placed in observation Medicine  · Placed on clear liquid diet  · Retained stool for stool studies and C diff  · Placed on contact precautions  · Patient received Cipro and Flagyl in the ER, will hold off on further antibiotics per pending procalcitonin in a m    · Consult GI

## 2022-01-09 LAB
ALBUMIN SERPL BCP-MCNC: 2.5 G/DL (ref 3.5–5)
ALP SERPL-CCNC: 125 U/L (ref 34–104)
ALT SERPL W P-5'-P-CCNC: 11 U/L (ref 7–52)
ANION GAP SERPL CALCULATED.3IONS-SCNC: 5 MMOL/L (ref 4–13)
AST SERPL W P-5'-P-CCNC: 21 U/L (ref 13–39)
BASOPHILS # BLD AUTO: 0.04 THOUSANDS/ΜL (ref 0–0.1)
BASOPHILS NFR BLD AUTO: 1 % (ref 0–1)
BILIRUB SERPL-MCNC: 0.97 MG/DL (ref 0.2–1)
BUN SERPL-MCNC: 13 MG/DL (ref 5–25)
C DIFF TOX A+B STL QL IA: POSITIVE
C DIFF TOX GENS STL QL NAA+PROBE: POSITIVE
CALCIUM ALBUM COR SERPL-MCNC: 9.2 MG/DL (ref 8.3–10.1)
CALCIUM SERPL-MCNC: 8 MG/DL (ref 8.4–10.2)
CHLORIDE SERPL-SCNC: 101 MMOL/L (ref 96–108)
CO2 SERPL-SCNC: 30 MMOL/L (ref 21–32)
CREAT SERPL-MCNC: 0.94 MG/DL (ref 0.6–1.3)
EOSINOPHIL # BLD AUTO: 0.33 THOUSAND/ΜL (ref 0–0.61)
EOSINOPHIL NFR BLD AUTO: 4 % (ref 0–6)
ERYTHROCYTE [DISTWIDTH] IN BLOOD BY AUTOMATED COUNT: 16.5 % (ref 11.6–15.1)
GFR SERPL CREATININE-BSD FRML MDRD: 61 ML/MIN/1.73SQ M
GLUCOSE SERPL-MCNC: 95 MG/DL (ref 65–140)
HCT VFR BLD AUTO: 34.3 % (ref 34.8–46.1)
HGB BLD-MCNC: 10.3 G/DL (ref 11.5–15.4)
IMM GRANULOCYTES # BLD AUTO: 0.02 THOUSAND/UL (ref 0–0.2)
IMM GRANULOCYTES NFR BLD AUTO: 0 % (ref 0–2)
LYMPHOCYTES # BLD AUTO: 0.89 THOUSANDS/ΜL (ref 0.6–4.47)
LYMPHOCYTES NFR BLD AUTO: 11 % (ref 14–44)
MCH RBC QN AUTO: 25.8 PG (ref 26.8–34.3)
MCHC RBC AUTO-ENTMCNC: 30 G/DL (ref 31.4–37.4)
MCV RBC AUTO: 86 FL (ref 82–98)
MONOCYTES # BLD AUTO: 0.52 THOUSAND/ΜL (ref 0.17–1.22)
MONOCYTES NFR BLD AUTO: 7 % (ref 4–12)
NEUTROPHILS # BLD AUTO: 6.1 THOUSANDS/ΜL (ref 1.85–7.62)
NEUTS SEG NFR BLD AUTO: 77 % (ref 43–75)
NRBC BLD AUTO-RTO: 0 /100 WBCS
PLATELET # BLD AUTO: 395 THOUSANDS/UL (ref 149–390)
PMV BLD AUTO: 8.6 FL (ref 8.9–12.7)
POTASSIUM SERPL-SCNC: 3.6 MMOL/L (ref 3.5–5.3)
PROCALCITONIN SERPL-MCNC: 0.06 NG/ML
PROCALCITONIN SERPL-MCNC: 0.07 NG/ML
PROT SERPL-MCNC: 6.3 G/DL (ref 6.4–8.4)
RBC # BLD AUTO: 4 MILLION/UL (ref 3.81–5.12)
SODIUM SERPL-SCNC: 136 MMOL/L (ref 135–147)
WBC # BLD AUTO: 7.9 THOUSAND/UL (ref 4.31–10.16)

## 2022-01-09 PROCEDURE — 99222 1ST HOSP IP/OBS MODERATE 55: CPT | Performed by: INTERNAL MEDICINE

## 2022-01-09 PROCEDURE — 80053 COMPREHEN METABOLIC PANEL: CPT | Performed by: HOSPITALIST

## 2022-01-09 PROCEDURE — 99232 SBSQ HOSP IP/OBS MODERATE 35: CPT | Performed by: NURSE PRACTITIONER

## 2022-01-09 PROCEDURE — 85025 COMPLETE CBC W/AUTO DIFF WBC: CPT | Performed by: HOSPITALIST

## 2022-01-09 PROCEDURE — 84145 PROCALCITONIN (PCT): CPT | Performed by: HOSPITALIST

## 2022-01-09 RX ORDER — POTASSIUM CHLORIDE 20 MEQ/1
40 TABLET, EXTENDED RELEASE ORAL ONCE
Status: COMPLETED | OUTPATIENT
Start: 2022-01-09 | End: 2022-01-09

## 2022-01-09 RX ORDER — SODIUM CHLORIDE 9 MG/ML
75 INJECTION, SOLUTION INTRAVENOUS CONTINUOUS
Status: DISCONTINUED | OUTPATIENT
Start: 2022-01-09 | End: 2022-01-11 | Stop reason: HOSPADM

## 2022-01-09 RX ADMIN — SODIUM CHLORIDE 125 ML/HR: 0.9 INJECTION, SOLUTION INTRAVENOUS at 04:11

## 2022-01-09 RX ADMIN — METRONIDAZOLE 500 MG: 500 TABLET ORAL at 13:45

## 2022-01-09 RX ADMIN — WARFARIN SODIUM 2 MG: 2 TABLET ORAL at 17:26

## 2022-01-09 RX ADMIN — Medication 125 MG: at 23:33

## 2022-01-09 RX ADMIN — CIPROFLOXACIN 400 MG: 2 INJECTION, SOLUTION INTRAVENOUS at 05:46

## 2022-01-09 RX ADMIN — ALLOPURINOL 100 MG: 100 TABLET ORAL at 09:25

## 2022-01-09 RX ADMIN — SODIUM CHLORIDE 75 ML/HR: 0.9 INJECTION, SOLUTION INTRAVENOUS at 07:57

## 2022-01-09 RX ADMIN — TRIAMCINOLONE ACETONIDE 1 APPLICATION: 1 CREAM TOPICAL at 19:13

## 2022-01-09 RX ADMIN — POTASSIUM CHLORIDE 40 MEQ: 1500 TABLET, EXTENDED RELEASE ORAL at 09:25

## 2022-01-09 RX ADMIN — Medication 125 MG: at 17:26

## 2022-01-09 RX ADMIN — TRIAMCINOLONE ACETONIDE: 1 CREAM TOPICAL at 09:25

## 2022-01-09 RX ADMIN — SODIUM CHLORIDE 75 ML/HR: 0.9 INJECTION, SOLUTION INTRAVENOUS at 18:16

## 2022-01-09 RX ADMIN — DULOXETINE HYDROCHLORIDE 20 MG: 20 CAPSULE, DELAYED RELEASE ORAL at 09:25

## 2022-01-09 RX ADMIN — LEVOTHYROXINE SODIUM 125 MCG: 25 TABLET ORAL at 05:45

## 2022-01-09 RX ADMIN — METRONIDAZOLE 500 MG: 500 TABLET ORAL at 05:45

## 2022-01-09 NOTE — PLAN OF CARE
Problem: Potential for Falls  Goal: Patient will remain free of falls  Description: INTERVENTIONS:  - Educate patient/family on patient safety including physical limitations  - Instruct patient to call for assistance with activity   - Consult OT/PT to assist with strengthening/mobility   - Keep Call bell within reach  - Keep bed low and locked with side rails adjusted as appropriate  - Keep care items and personal belongings within reach  - Initiate and maintain comfort rounds  - Make Fall Risk Sign visible to staff  - Offer Toileting every 1 Hours, in advance of need  - Initiate/Maintain bed alarm  - Obtain necessary fall risk management equipment: bed   - Apply yellow socks and bracelet for high fall risk patients  - Consider moving patient to room near nurses station  Outcome: Progressing     Problem: MOBILITY - ADULT  Goal: Maintain or return to baseline ADL function  Description: INTERVENTIONS:  -  Assess patient's ability to carry out ADLs; assess patient's baseline for ADL function and identify physical deficits which impact ability to perform ADLs (bathing, care of mouth/teeth, toileting, grooming, dressing, etc )  - Assess/evaluate cause of self-care deficits   - Assess range of motion  - Assess patient's mobility; develop plan if impaired  - Assess patient's need for assistive devices and provide as appropriate  - Encourage maximum independence but intervene and supervise when necessary  - Involve family in performance of ADLs  - Assess for home care needs following discharge   - Consider OT consult to assist with ADL evaluation and planning for discharge  - Provide patient education as appropriate  Outcome: Progressing  Goal: Maintains/Returns to pre admission functional level  Description: INTERVENTIONS:  - Perform BMAT or MOVE assessment daily    - Set and communicate daily mobility goal to care team and patient/family/caregiver     - Collaborate with rehabilitation services on mobility goals if consulted  - Perform Range of Motion 3 times a day  - Reposition patient every 2 hours    - Dangle patient 3 times a day  - Stand patient 3 times a day  - Ambulate patient 3 times a day  - Out of bed to chair 3 times a day   - Out of bed for meals 3 times a day  - Out of bed for toileting  - Record patient progress and toleration of activity level   Outcome: Progressing     Problem: Prexisting or High Potential for Compromised Skin Integrity  Goal: Skin integrity is maintained or improved  Description: INTERVENTIONS:  - Identify patients at risk for skin breakdown  - Assess and monitor skin integrity  - Assess and monitor nutrition and hydration status  - Monitor labs   - Assess for incontinence   - Turn and reposition patient  - Assist with mobility/ambulation  - Relieve pressure over bony prominences  - Avoid friction and shearing  - Provide appropriate hygiene as needed including keeping skin clean and dry  - Evaluate need for skin moisturizer/barrier cream  - Collaborate with interdisciplinary team   - Patient/family teaching  - Consider wound care consult   Outcome: Progressing     Problem: PAIN - ADULT  Goal: Verbalizes/displays adequate comfort level or baseline comfort level  Description: Interventions:  - Encourage patient to monitor pain and request assistance  - Assess pain using appropriate pain scale  - Administer analgesics based on type and severity of pain and evaluate response  - Implement non-pharmacological measures as appropriate and evaluate response  - Consider cultural and social influences on pain and pain management  - Notify physician/advanced practitioner if interventions unsuccessful or patient reports new pain  Outcome: Progressing     Problem: INFECTION - ADULT  Goal: Absence or prevention of progression during hospitalization  Description: INTERVENTIONS:  - Assess and monitor for signs and symptoms of infection  - Monitor lab/diagnostic results  - Monitor all insertion sites, i e  indwelling lines, tubes, and drains  - Monitor endotracheal if appropriate and nasal secretions for changes in amount and color  - Merrill appropriate cooling/warming therapies per order  - Administer medications as ordered  - Instruct and encourage patient and family to use good hand hygiene technique  - Identify and instruct in appropriate isolation precautions for identified infection/condition  Outcome: Progressing  Goal: Absence of fever/infection during neutropenic period  Description: INTERVENTIONS:  - Monitor WBC    Outcome: Progressing     Problem: SAFETY ADULT  Goal: Patient will remain free of falls  Description: INTERVENTIONS:  - Educate patient/family on patient safety including physical limitations  - Instruct patient to call for assistance with activity   - Consult OT/PT to assist with strengthening/mobility   - Keep Call bell within reach  - Keep bed low and locked with side rails adjusted as appropriate  - Keep care items and personal belongings within reach  - Initiate and maintain comfort rounds  - Make Fall Risk Sign visible to staff  - Offer Toileting every 1 Hours, in advance of need  - Initiate/Maintain bed alarm  - Obtain necessary fall risk management equipment: bed   - Apply yellow socks and bracelet for high fall risk patients  - Consider moving patient to room near nurses station  Outcome: Progressing  Goal: Maintain or return to baseline ADL function  Description: INTERVENTIONS:  -  Assess patient's ability to carry out ADLs; assess patient's baseline for ADL function and identify physical deficits which impact ability to perform ADLs (bathing, care of mouth/teeth, toileting, grooming, dressing, etc )  - Assess/evaluate cause of self-care deficits   - Assess range of motion  - Assess patient's mobility; develop plan if impaired  - Assess patient's need for assistive devices and provide as appropriate  - Encourage maximum independence but intervene and supervise when necessary  - Involve family in performance of ADLs  - Assess for home care needs following discharge   - Consider OT consult to assist with ADL evaluation and planning for discharge  - Provide patient education as appropriate  Outcome: Progressing  Goal: Maintains/Returns to pre admission functional level  Description: INTERVENTIONS:  - Perform BMAT or MOVE assessment daily    - Set and communicate daily mobility goal to care team and patient/family/caregiver  - Collaborate with rehabilitation services on mobility goals if consulted  - Perform Range of Motion 3 times a day  - Reposition patient every 2 hours  - Dangle patient 3 times a day  - Stand patient 3 times a day  - Ambulate patient 3 times a day  - Out of bed to chair 3 times a day   - Out of bed for meals 3 times a day  - Out of bed for toileting  - Record patient progress and toleration of activity level   Outcome: Progressing     Problem: DISCHARGE PLANNING  Goal: Discharge to home or other facility with appropriate resources  Description: INTERVENTIONS:  - Identify barriers to discharge w/patient and caregiver  - Arrange for needed discharge resources and transportation as appropriate  - Identify discharge learning needs (meds, wound care, etc )  - Arrange for interpretive services to assist at discharge as needed  - Refer to Case Management Department for coordinating discharge planning if the patient needs post-hospital services based on physician/advanced practitioner order or complex needs related to functional status, cognitive ability, or social support system  Outcome: Progressing     Problem: Knowledge Deficit  Goal: Patient/family/caregiver demonstrates understanding of disease process, treatment plan, medications, and discharge instructions  Description: Complete learning assessment and assess knowledge base    Interventions:  - Provide teaching at level of understanding  - Provide teaching via preferred learning methods  Outcome: Progressing

## 2022-01-09 NOTE — ASSESSMENT & PLAN NOTE
· CT shows sigmoid inflammation   · Likely infectious vs inflammatory causes   · Continue with ciprofloxacin and flagyl day #3  · GI input appreciated  · If clinically improves then will need to follow up with GI outpatient for a colonoscopy  However if clinically worsens may need inpatient colonoscopy to further evaluate      · C diff- pending   · Diarrhea is improving   · Will advance diet

## 2022-01-09 NOTE — NURSING NOTE
Unable to get rest of the pts stool samples, not enough stool when pt goes to the bathroom, pt aware of cdiff

## 2022-01-09 NOTE — NURSING NOTE
Pt from er to room 219, oriented to the unit and the callbell, pt presently in bed in no acute distress sleeping

## 2022-01-09 NOTE — ASSESSMENT & PLAN NOTE
· Being treated as per above  · Patient has history of C diff in 2007 as well as irritable bowel- no prior colonoscopy   · GI input appreciated

## 2022-01-09 NOTE — ASSESSMENT & PLAN NOTE
Wt Readings from Last 3 Encounters:   01/08/22 (!) 209 kg (460 lb 12 2 oz)   11/18/21 (!) 172 kg (380 lb)   10/03/20 (!) 210 kg (462 lb 15 5 oz)     · Does not appear to be in volume overload at this time   · Monitor weight closely

## 2022-01-09 NOTE — ASSESSMENT & PLAN NOTE
· Continue pre-hospital Coumadin 2 mg alternating with 5 mg  · Currently rate is controlled   Not on any medication for rate control

## 2022-01-09 NOTE — ASSESSMENT & PLAN NOTE
· Creatinine appears to be baseline 0 9-1 1 mg/dL   · Currently at baseline  · Monitor renal function closely   · Avoid hypotension and nephrotoxins

## 2022-01-09 NOTE — CASE MANAGEMENT
Case Management Assessment & Discharge Planning Note    Patient name Leeann Peralta  Location [address] [id number]/-[id number] MRN [id number]  : 1950 Date 2022       Current Admission Date: 2022  Current Admission Diagnosis:Colitis   Patient Active Problem List    Diagnosis Date Noted    Colitis 2022    Diarrhea of presumed infectious origin 2022    Generalized abdominal pain     Lymphedema of extremity 2021    Paroxysmal atrial fibrillation ([place] [address] ) 10/03/2020    Other specified hypothyroidism 10/03/2020    Mild episode of recurrent major depressive disorder ([place] [address] ) 10/03/2020    Idiopathic chronic 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 10/03/2020    Primary osteoarthritis involving multiple joints 10/03/2020    Morbid obesity with BMI of 60 0-69 9, adult ([place] [address] ) 10/03/2020    Chronic diastolic congestive heart failure ([place] [address] ) 10/03/2020    Gastroesophageal reflux disease without esophagitis 10/03/2020    Hx MRSA infection 2020    Left ovarian cyst 2017    Depression with anxiety 07/15/2017    Bilateral lower extremity edema 2017    Chronic pain disorder 2017    Anemia 2016    Ambulatory dysfunction 2016    CKD (chronic kidney disease) stage 3, GFR 30-59 ml/min ([facility]) 2016    Adjustment disorder 2013    Renal insufficiency 2013    Irritable bowel syndrome 2012    Left atrial enlargement 2012    Left ventricular hypertrophy 2012    Carpal tunnel syndrome 2012    Gout 2012    Hyperlipidemia 2012    Osteoarthritis 2012    Symptomatic menopausal or female climacteric states 2012      LOS (days): 1  Geometric Mean LOS (GMLOS) (days): 2 60  Days to GMLOS:1 7     OBJECTIVE:    Risk of Unplanned Readmission Score: 15     Current admission status: Inpatient  Referral Reason:  (Discharge planning)    Preferred Pharmacy:    [address] [place]ia  [facility] [place]  Phone: [phone] Fax: [phone]    Primary Care Provider: ORACIO Hardy    Primary Insurance: MEDICARE  Secondary Insurance: AARP    ASSESSMENT:  Active Health Care Agents    There are no active Health Care Agents on file  Advance Directives  Does patient have a [address]?: No  Was patient offered paperwork?: Yes (Paperwork provided)  Does patient currently have a Health Care decision maker?: Yes, please see Health Care Proxy section  Does patient have Advance Directives?: No  Was patient offered paperwork?: Yes (Paperwork provided)  Primary Contact: Modesta nayak    Readmission Root Cause  30 Day Readmission: No    Patient Information  Admitted from[de-identified] Home  Mental Status: Alert  During Assessment patient was accompanied by: Not accompanied during assessment  Assessment information provided by[de-identified] Patient  Primary Caregiver: Self  Support Systems: Family members  [place] of Residence: [address] do you live in?: [address] access options   Select all that apply : Ramp  Type of Current Residence: 2 story home  Upon entering residence, is there a bedroom on the main floor (no further steps)?: Yes  Upon entering residence, is there a bathroom on the main floor (no further steps)?: Yes  In the last 12 months, was there a time when you were not able to pay the mortgage or rent on time?: No  In the last 12 months, how many places have you lived?: 1  In the last 12 months, was there a time when you did not have a steady place to sleep or slept in a shelter (including now)?: No  Homeless/housing insecurity resource given?: N/A  Living Arrangements: Lives Alone  Is patient a ?: No    Activities of Daily Living Prior to Admission  Functional Status: Assistance  Completes ADLs independently?: No  Level of ADL dependence: Assistance  Ambulates independently?:  (Bedbound)  Does patient use assisted devices?: Yes  Assisted Devices (DME) used: Hospital Bed ([facility])  Does patient currently own DME?: Yes  What DME does the patient currently own?: Hospital Bed  Does patient have a history of Outpatient Therapy (PT/OT)?: No  Does the patient have a history of Short-Term Rehab?: Yes ([facility])  Does patient have a history of HHC?: Yes ([facility])  Does patient currently have [address]?: Yes Reji Joy)    Current [address] [facility]  Type of Current Home Care Services: Nurse visit  Current Home Health Agency[de-identified] [address] [facility] Provider[de-identified] PCP    Patient Information Continued  Income Source: SSI/SSD  Does patient have prescription coverage?: Yes  Within the past 12 months, you worried that your food would run out before you got the money to buy more : Never true  Within the past 12 months, the food you bought just didnt last and you didnt have money to get more : Never true  Food insecurity resource given?: N/A  Does patient receive dialysis treatments?: No  Does patient have a history of substance abuse?: No  Does patient have a history of Mental Health Diagnosis?: No    PHQ 2/9 Screening   Reviewed PHQ 2/9 Depression Screening Score?: No    Means of Transportation  Means of Transport to Appts[de-identified] Other (Comment) (Needs BLS)  In the past 12 months, has lack of transportation kept you from medical appointments or from getting medications?: No  In the past 12 months, has lack of transportation kept you from meetings, work, or from getting things needed for daily living?: No  Was application for public transport provided?: N/A        DISCHARGE DETAILS:    Discharge planning discussed with[de-identified] Pt  Freedom of Choice: Yes  Comments - Freedom of Choice: Pt wants to resume services with [facility]  [facility]          Is the patient interested in [address] at discharge?: Yes  [address] requested[de-identified] [address] Name[de-identified] [employer] Provider[de-identified] PCP  [address] Needed[de-identified] Wound/Ostomy Care  Homebound Criteria Met[de-identified] Requires the Assistance of Another Person for Safe Ambulation or to Leave the [facility] Medical Transportation  Supporting Clincal Findings[de-identified] Bed Bound or Wheelchair Bound    DME Referral Provided  Referral made for DME?: No    Would you like to participate in our [address] service program?  : No - Declined    Treatment Team Recommendation: Home with [address] [employer]  Discharge Destination Plan[de-identified] Home with [address] [employer]   Pt indicated she is active with [facility]  Referral made to resume same  Pt states she is on the waiver program and has CG 12hr/day  Pt is completely bedbound and does not get up with a aisha lift  PCP Kenia Proctor makes home visits  Pt will need BLS transport home upon discharge

## 2022-01-09 NOTE — APP STUDENT NOTE
S:  CC: diarrhea x1 week with abdominal discomfort and nausea    HPI: Patient is a 80year old female who presented to the ED with complaints of diarrhea x1 week with abdominal pain and nausea x2 days  She has a history of IBS and Cdiff a few years ago, and reports she had been on Zithromax for a dental procedure about 3 weeks ago  CT scan of abdomen and pelvis reveals thickening and inflammatory changes around the sigmoid colon which may represent colitis (infection versus inflammatory can't rule out ischemia)  Patient was started on flagyl and cipro  Stool specimens were collected with results pending  GI is following patient, agrees with clear liquid diet and awaiting stool results, if negative and diarrhea resolves recommend follow up colonoscopy as outpt, if stools continue and stools negative may need inpt colonoscopy  Patient is morbidly obese and bed bound at baseline, has caretakers at home for 12 hour increments per patient  O: Vitals: [id number], 97% on room air  Physical assessment:  Patient is alert and oriented to person, place, time and situation  Reports she is still having loose stools and some abdominal discomfort  Describes the pain as intermittently sharp pain mainly in the right lower quadrant and shoots across her lower abdomen  Abdomen is soft, obese, slightly tender on palpation of lower abdomen, +Bsx4 quads  Tolerating clear liquids  Lungs clear and decreased B/L all fields on ausculation  Assessment/Plan    1  Colitis  -Patient reports diarrhea x1 week with abdominal discomfort and some nausea  -has hx IBS and has had Cdiff in past, on course of Zithromax about 3 weeks ago for dental procedure  -CT scan of abdomen and pelvis 1/7/22 reveals thickening and inflammatory changes around the sigmoid colon which may represent colitis (infection versus inflammatory can't rule out ischemia)     -stools for cdiff, enteric bacteria, calprotein, ova and parasites sent, results pending  -GI following, agrees with clear liquid diet and awaiting stool results, if negative and diarrhea resolves recommend follow up colonoscopy as outpt, if stools continue and stools negative may need inpt colonoscopy  -continue clear liquid diet  -continue flagyl 500mg orally every 8 hours  -continue cipro 400mg IV every 12 hours  -continue zofran 4mg as ordered as needed for n/v  -continue normal saline at 75/hr    2  Diarrhea  -Patient reports diarrhea x1 week with abdominal discomfort and some nausea  -has hx IBS and has had Cdiff in past, on course of Zithromax about 3 weeks ago for dental procedure  -CT scan of abdomen and pelvis 1/7/22 reveals thickening and inflammatory changes around the sigmoid colon which may represent colitis (infection versus inflammatory can't rule out ischemia)  -stools for cdiff, enteric bacteria, calprotein, ova and parasites sent, results pending  No antidiarrheal at this time pending stool results  -GI following, agrees with clear liquid diet and awaiting stool results, if negative and diarrhea resolves recommend follow up colonoscopy as outpt, if stools continue and stools negative may need inpt colonoscopy  -IVF's at 75/hr    3  Paroxsymal afib  -rate well controlled, HR 60-70's, nothing for rate control per her home med list  -PT on admit was 23 1 and INR 2 10  -no s/s of bleeding, continue coumadin 5 mg every 48 hours as ordered    Plan: Patient is morbidly obese and bedbound at baseline  Has caregivers in her home for 12 hours a day, wants to return home with her caregivers when stable for discharge

## 2022-01-09 NOTE — PROGRESS NOTES
[facility]  Progress Note - Josh Blanc 1950, 80 y o  female MRN: [id number]  Unit/Bed#: -01 Encounter: [id number]  Primary Care Provider: ORACIO Hannon   Date and time admitted to hospital: 1/7/2022  2:19 PM    * Colitis  Assessment & Plan  · CT shows sigmoid inflammation   · Likely infectious vs inflammatory causes   · Continue with ciprofloxacin and flagyl day #3  · GI input appreciated  · If clinically improves then will need to follow up with GI outpatient for a colonoscopy  However if clinically worsens may need inpatient colonoscopy to further evaluate  · C diff- pending   · Diarrhea is improving   · Will advance diet     Diarrhea of presumed infectious origin  Assessment & Plan  · Being treated as per above  · Patient has history of C diff in 2007 as well as irritable bowel- no prior colonoscopy   · GI input appreciated     Renal insufficiency  Assessment & Plan  · Creatinine appears to be baseline 0 9-1 1 mg/dL   · Currently at baseline  · Monitor renal function closely   · Avoid hypotension and nephrotoxins     Chronic diastolic congestive heart failure (HCC)  Assessment & Plan  Wt Readings from Last 3 Encounters:   01/08/22 (!) 209 kg (460 lb 12 2 oz)   11/18/21 (!) 172 kg (380 lb)   10/03/20 (!) 210 kg (462 lb 15 5 oz)     · Does not appear to be in volume overload at this time   · Monitor weight closely          Morbid obesity with BMI of 60 0-69 9, adult ([facility] [address] )  Assessment & Plan  · Encourage weight loss and supportive care    Other specified hypothyroidism  Assessment & Plan  · Continue pre-hospital levothyroxine 125 mcg p o  daily    Paroxysmal atrial fibrillation (HCC)  Assessment & Plan  · Continue pre-hospital Coumadin 2 mg alternating with 5 mg  · Currently rate is controlled   Not on any medication for rate control       VTE Prophylaxis:  Warfarin (Coumadin)    Patient Centered Rounds: I have performed bedside rounds with nursing staff today     Discussions with Specialists or Other Care Team Provider: GI   Education and Discussions with Family / Patient: patient     Current Length of Stay: 1 day(s)    Current Patient Status: Inpatient   Certification Statement: The patient will continue to require additional inpatient hospital stay due to colitis     Discharge Plan: pending hospital course  Hopeful discharge tomorrow  Code Status: Level 1 - Full Code    Subjective:   Feeling slightly better but continues to have abdominal pain  Reports very small amount of stool x 2 this AM when urinates  Objective:     Vitals:   Temp (24hrs), Av 1 °F (36 7 °C), Min:97 8 °F (36 6 °C), Max:98 4 °F (36 9 °C)    Temp:  [97 8 °F (36 6 °C)-98 4 °F (36 9 °C)] 97 8 °F (36 6 °C)  HR:  [68-78] 68  Resp:  [17-20] 17  BP: ()/(38-62) 109/57  SpO2:  [94 %-97 %] 97 %  Body mass index is 76 67 kg/m²  Input and Output Summary (last 24 hours): Intake/Output Summary (Last 24 hours) at 2022 1338  Last data filed at 2022 1101  Gross per 24 hour   Intake 1470 ml   Output 700 ml   Net 770 ml       Physical Exam:   Physical Exam  Vitals and nursing note reviewed  Constitutional:       General: She is not in acute distress  Appearance: Normal appearance  She is obese  She is ill-appearing  HENT:      Head: Normocephalic and atraumatic  Right Ear: External ear normal       Left Ear: External ear normal       Nose: Nose normal  No rhinorrhea  Mouth/Throat:      Mouth: Mucous membranes are moist       Pharynx: Oropharynx is clear  Eyes:      General:         Right eye: No discharge  Left eye: No discharge  Pupils: Pupils are equal, round, and reactive to light  Cardiovascular:      Rate and Rhythm: Normal rate and regular rhythm  Pulses: Normal pulses  Heart sounds: Normal heart sounds  No murmur heard  Pulmonary:      Effort: Pulmonary effort is normal  No respiratory distress        Breath sounds: Normal breath sounds  Abdominal:      General: Bowel sounds are normal  There is no distension  Palpations: Abdomen is soft  There is no mass  Tenderness: There is abdominal tenderness (lower quadrants )  Musculoskeletal:         General: No swelling or tenderness  Normal range of motion  Cervical back: Normal range of motion and neck supple  No muscular tenderness  Skin:     General: Skin is warm and dry  Capillary Refill: Capillary refill takes less than 2 seconds  Findings: No erythema or rash  Neurological:      General: No focal deficit present  Mental Status: She is alert and oriented to person, place, and time  Mental status is at baseline  Psychiatric:         Mood and Affect: Mood normal          Behavior: Behavior normal          Thought Content: Thought content normal          Judgment: Judgment normal          Additional Data:     Labs:    Results from last 7 days   Lab Units 01/09/22  0545   WBC Thousand/uL 7 90   HEMOGLOBIN g/dL 10 3*   HEMATOCRIT % 34 3*   PLATELETS Thousands/uL 395*   NEUTROS PCT % 77*   LYMPHS PCT % 11*   MONOS PCT % 7   EOS PCT % 4     Results from last 7 days   Lab Units 01/09/22  0545   SODIUM mmol/L 136   POTASSIUM mmol/L 3 6   CHLORIDE mmol/L 101   CO2 mmol/L 30   BUN mg/dL 13   CREATININE mg/dL 0 94   CALCIUM mg/dL 8 0*   ALK PHOS U/L 125*   ALT U/L 11   AST U/L 21     Results from last 7 days   Lab Units 01/07/22  1524   INR  2 10*               * I Have Reviewed All Lab Data Listed Above  * Additional Pertinent Lab Tests Reviewed: [facility] Admission  Reviewed    Imaging:  Imaging Reports Reviewed Today Include: CT a/p     Recent Cultures (last 7 days):     Results from last 7 days   Lab Units 01/07/22  1522 01/07/22  1520   BLOOD CULTURE  No Growth at 24 hrs  No Growth at 24 hrs         Last 24 Hours Medication List:   Current Facility-Administered Medications   Medication Dose Route Frequency Provider Last Rate    acetaminophen  650 mg Oral Q6H PRN Olga Ee, PA-C      allopurinol  100 mg Oral Daily Olga Ee, PA-C      ciprofloxacin  400 mg Intravenous Q12H Vero Millan  mg (01/09/22 4562)    DULoxetine  20 mg Oral Daily Olga Ee, PA-C      influenza vaccine  0 7 mL Intramuscular Once Vero Millan MD      levothyroxine  125 mcg Oral Daily Olga Ee, PA-C      metroNIDAZOLE  500 mg Oral [facility] Vero Millan MD      ondansetron  4 mg Intravenous Q6H PRN Olga Ee, PA-C      sodium chloride  75 mL/hr Intravenous Continuous ORACIO Welsh 75 mL/hr (01/09/22 3747)    triamcinolone   Topical BID Olga Ee, PA-C      warfarin  2 mg Oral Q48H Olga Ee, PA-C      warfarin  5 mg Oral Q48H Olga Ee, PA-C          Today, Patient Was Seen By: ORACIO Welsh    ** Please Note: Dictation voice to text software may have been used in the creation of this document   **

## 2022-01-09 NOTE — CONSULTS
Consultation - [facility]) [facility]  Amina Page 80 y o  female MRN: [id number]  Unit/Bed#: -01 Encounter: [id number]        Inpatient consult to gastroenterology  Consult performed by: Rosetta Sampson MD  Consult ordered by: Akira Welsh          Reason for Consult / Principal Problem: Colitis     ASSESSMENT/ PLAN:  52-year-old female with history of morbid obesity, CHF, atrial fibrillation on Coumadin admitted with abdominal pain and diarrhea with CT imaging showing sigmoid inflammation  Etiologies include infectious versus inflammatory causes  - follow-up stool infectious studies and fecal calprotectin  - Continue clear liquid diet  - if her stool studies are negative her symptoms improved she can be discharged home with plans for outpatient follow-up to discuss colonoscopy given that she has had none in the past  -if her symptoms persist and stool studies are negative she may require inpatient colonoscopy for further evaluation      HPI: Amina Page is a 80y o  year old female who presents with Colitis  She has a history of CHF, morbid obesity, depression, atrial fibrillation on Coumadin  She presents with abdominal pain and diarrhea for 1 week  She denies bloody stools  She reports crampy diffuse abdominal pain worse in the epigastrium in the right abdomen  She does report a course of antibiotics 3 weeks ago for a dental procedure  She otherwise denies sick contacts or travel history  The diarrhea is watery, with mucus  She reports a remote history of C diff colitis in 2007  On presentation labs show a white blood cell count 10 increased to 12 today, hemoglobin 11 1, MCV 86  Liver enzymes with alkaline phosphatase 123 total bilirubin 1 1, lipase normal    CT abdomen pelvis showed thickened inflammatory changes are on the sigmoid colon      She reports no prior EGD or colonoscopy      Review of Systems  CONSTITUTIONAL: Denies any fever, chills, rigors, and weight loss  HEENT: No earache or tinnitus  Denies hearing loss or visual disturbances  CARDIOVASCULAR: No chest pain or palpitations  RESPIRATORY: Denies any cough, hemoptysis, shortness of breath or dyspnea on exertion  GASTROINTESTINAL: As noted in the History of Present Illness  GENITOURINARY: No problems with urination  Denies any hematuria or dysuria  NEUROLOGIC: No dizziness or vertigo, denies headaches  MUSCULOSKELETAL: Denies any muscle or joint pain  SKIN: Denies skin rashes or itching  ENDOCRINE: Denies excessive thirst  Denies intolerance to heat or cold  PSYCHOSOCIAL: Denies depression or anxiety  Denies any recent memory loss       Historical Information   Past Medical History:   Diagnosis Date    Atrial fibrillation ([place] [address] )     Bladder stone     C  difficile colitis     Cellulitis     CHF (congestive heart failure) ([place] [address] )     Depression with anxiety     Disease of thyroid gland     Diverticulitis     Enterocolitis     History of abnormal cervical Pap smear     Kidney stone     Lymphedema     Menopause     Age 53    Morbid obesity with BMI of 70 and over, adult ([place] [address] )     MRSA (methicillin resistant Staphylococcus aureus)     abdominal wound    Osteoarthritis     Phlebitis     left lower leg    Spondylolysis      Past Surgical History:   Procedure Laterality Date    APPENDECTOMY      CARPAL TUNNEL RELEASE      CHOLECYSTECTOMY      CYSTOSCOPY      stent    FOOT SURGERY  1982    bone spur    KNEE SURGERY      WISDOM TOOTH EXTRACTION       Social History   Social History     Substance and Sexual Activity   Alcohol Use Never     Social History     Substance and Sexual Activity   Drug Use Never    Comment: As a teen - As per [employer]      Social History     Tobacco Use   Smoking Status Former Smoker   Smokeless Tobacco Never Used   Tobacco Comment    5 packs/day until 5 (age 16-20) - As per [employer]      Family History   Problem Relation Age of Onset    Heart failure Mother     Heart disease Mother     Lymphoma Mother     Kidney disease Father     Heart disease Father     Heart failure Father     Diabetes type II Father     Diabetes type II Sister     Diabetes type II Brother     Uterine cancer Paternal Aunt     Breast cancer Neg Hx     Colon cancer Neg Hx     Ovarian cancer Neg Hx        Meds/Allergies     Medications Prior to Admission   Medication    acetaminophen (TYLENOL) 650 mg CR tablet    allopurinol (ZYLOPRIM) 100 mg tablet    DULoxetine (CYMBALTA) 20 mg capsule    furosemide (LASIX) 40 mg tablet    levothyroxine 125 mcg tablet    nystatin (MYCOSTATIN) powder    triamcinolone (KENALOG) 0 1 % cream    warfarin (COUMADIN) 2 5 mg tablet    warfarin (COUMADIN) 4 mg tablet     Current Facility-Administered Medications   Medication Dose Route Frequency    acetaminophen (TYLENOL) tablet 650 mg  650 mg Oral Q6H PRN    allopurinol (ZYLOPRIM) tablet 100 mg  100 mg Oral Daily    ciprofloxacin (CIPRO) IVPB (premix in 5% dextrose) 400 mg 200 mL  400 mg Intravenous Q12H    DULoxetine (CYMBALTA) delayed release capsule 20 mg  20 mg Oral Daily    influenza vaccine, high-dose (FLUZONE HIGH-DOSE) IM injection JENNIFER 0 7 mL  0 7 mL Intramuscular Once    levothyroxine tablet 125 mcg  125 mcg Oral Daily    metroNIDAZOLE (FLAGYL) tablet 500 mg  500 mg Oral Q8H [address]    ondansetron (ZOFRAN) injection 4 mg  4 mg Intravenous Q6H PRN    sodium chloride 0 9 % infusion  75 mL/hr Intravenous Continuous    triamcinolone (KENALOG) 0 1 % cream   Topical BID    warfarin (COUMADIN) tablet 2 mg  2 mg Oral Q48H    warfarin (COUMADIN) tablet 5 mg  5 mg Oral Q48H       Allergies   Allergen Reactions    Augmentin Es-600  [Amoxicillin-Pot Clavulanate]     Penicillins Other (See Comments)     Tolerates cefepime (11/18/21)    Sulfa Antibiotics Hives    Vitamin C [Ascorbate - Food Allergy]     Latex Rash and Edema       Objective     Blood pressure 109/57, pulse 68, temperature 97 8 °F (36 6 °C), resp  rate 17, height 5' 5" (1 651 m), weight (!) 209 kg (460 lb 12 2 oz), SpO2 97 %        Intake/Output Summary (Last 24 hours) at 1/9/2022 0955  Last data filed at 1/9/2022 0411  Gross per 24 hour   Intake 990 ml   Output 400 ml   Net 590 ml       PHYSICAL EXAM   Physical Exam:    General : normocephalic, atraumatic  Eyes : EOMI  Oropharynx : Mucous membranes are moist  Neck : No lymphadenopathy  CVS :  Irregular irregular  Respiratory :  Nonlabored respirations  Abdomen : soft, mild diffuse tenderness to deep palpation without rebound or guarding, nondistended, normoactive bowel bowel sounds  Musculoskeletal : no edema bilaterally  Skin : No rashes  Neuro : Alert, Awake and Oriented x 3           Lab Results:   Admission on 01/07/2022   Component Date Value    WBC 01/07/2022 10 15     RBC 01/07/2022 4 32     Hemoglobin 01/07/2022 11 1*    Hematocrit 01/07/2022 37 2     MCV 01/07/2022 86     MCH 01/07/2022 25 7*    MCHC 01/07/2022 29 8*    RDW 01/07/2022 16 2*    MPV 01/07/2022 8 4*    Platelets 42/79/5623 434*    nRBC 01/07/2022 0     Neutrophils Relative 01/07/2022 80*    Immat GRANS % 01/07/2022 0     Lymphocytes Relative 01/07/2022 11*    Monocytes Relative 01/07/2022 6     Eosinophils Relative 01/07/2022 3     Basophils Relative 01/07/2022 0     Neutrophils Absolute 01/07/2022 8 14*    Immature Grans Absolute 01/07/2022 0 03     Lymphocytes Absolute 01/07/2022 1 10     Monocytes Absolute 01/07/2022 0 56     Eosinophils Absolute 01/07/2022 0 28     Basophils Absolute 01/07/2022 0 04     Sodium 01/07/2022 135     Potassium 01/07/2022 3 8     Chloride 01/07/2022 98     CO2 01/07/2022 32     ANION GAP 01/07/2022 5     BUN 01/07/2022 16     Creatinine 01/07/2022 0 95     Glucose 01/07/2022 110     Calcium 01/07/2022 8 5     Corrected Calcium 01/07/2022 9 4     AST 01/07/2022 18     ALT 01/07/2022 11     Alkaline Phosphatase 01/07/2022 123*    Total Protein 01/07/2022 7 3     Albumin 01/07/2022 2 9*    Total Bilirubin 01/07/2022 1 13*    eGFR 01/07/2022 60     Blood Culture 01/07/2022 No Growth at 24 hrs   Blood Culture 01/07/2022 No Growth at 24 hrs       Protime 01/07/2022 23 1*    INR 01/07/2022 2 10*    PTT 01/07/2022 53*    LACTIC ACID 01/07/2022 1 0     Lipase 01/07/2022 23     Sodium 01/08/2022 135     Potassium 01/08/2022 3 8     Chloride 01/08/2022 99     CO2 01/08/2022 31     ANION GAP 01/08/2022 5     BUN 01/08/2022 15     Creatinine 01/08/2022 1 00     Glucose 01/08/2022 107     Glucose, Fasting 01/08/2022 107*    Calcium 01/08/2022 8 4     eGFR 01/08/2022 57     WBC 01/08/2022 12 23*    RBC 01/08/2022 4 30     Hemoglobin 01/08/2022 11 1*    Hematocrit 01/08/2022 37 0     MCV 01/08/2022 86     MCH 01/08/2022 25 8*    MCHC 01/08/2022 30 0*    RDW 01/08/2022 16 5*    MPV 01/08/2022 8 3*    Platelets 81/99/6596 407*    nRBC 01/08/2022 0     Neutrophils Relative 01/08/2022 81*    Immat GRANS % 01/08/2022 0     Lymphocytes Relative 01/08/2022 9*    Monocytes Relative 01/08/2022 6     Eosinophils Relative 01/08/2022 3     Basophils Relative 01/08/2022 1     Neutrophils Absolute 01/08/2022 10 00*    Immature Grans Absolute 01/08/2022 0 03     Lymphocytes Absolute 01/08/2022 1 08     Monocytes Absolute 01/08/2022 0 74     Eosinophils Absolute 01/08/2022 0 32     Basophils Absolute 01/08/2022 0 06     Procalcitonin 01/08/2022 0 07     Sodium 01/09/2022 136     Potassium 01/09/2022 3 6     Chloride 01/09/2022 101     CO2 01/09/2022 30     ANION GAP 01/09/2022 5     BUN 01/09/2022 13     Creatinine 01/09/2022 0 94     Glucose 01/09/2022 95     Calcium 01/09/2022 8 0*    Corrected Calcium 01/09/2022 9 2     AST 01/09/2022 21     ALT 01/09/2022 11     Alkaline Phosphatase 01/09/2022 125*    Total Protein 01/09/2022 6 3*    Albumin 01/09/2022 2 5*    Total Bilirubin 01/09/2022 0 97     eGFR 01/09/2022 61     WBC 01/09/2022 7 90     RBC 01/09/2022 4 00     Hemoglobin 01/09/2022 10 3*    Hematocrit 01/09/2022 34 3*    MCV 01/09/2022 86     MCH 01/09/2022 25 8*    MCHC 01/09/2022 30 0*    RDW 01/09/2022 16 5*    MPV 01/09/2022 8 6*    Platelets 56/44/6366 395*    nRBC 01/09/2022 0     Neutrophils Relative 01/09/2022 77*    Immat GRANS % 01/09/2022 0     Lymphocytes Relative 01/09/2022 11*    Monocytes Relative 01/09/2022 7     Eosinophils Relative 01/09/2022 4     Basophils Relative 01/09/2022 1     Neutrophils Absolute 01/09/2022 6 10     Immature Grans Absolute 01/09/2022 0 02     Lymphocytes Absolute 01/09/2022 0 89     Monocytes Absolute 01/09/2022 0 52     Eosinophils Absolute 01/09/2022 0 33     Basophils Absolute 01/09/2022 0 04      Imaging Studies: I have personally reviewed pertinent reports

## 2022-01-10 PROBLEM — A04.72 C. DIFFICILE COLITIS: Status: ACTIVE | Noted: 2022-01-08

## 2022-01-10 LAB
ANION GAP SERPL CALCULATED.3IONS-SCNC: 7 MMOL/L (ref 4–13)
BUN SERPL-MCNC: 11 MG/DL (ref 5–25)
CALCIUM SERPL-MCNC: 8.3 MG/DL (ref 8.4–10.2)
CHLORIDE SERPL-SCNC: 99 MMOL/L (ref 96–108)
CO2 SERPL-SCNC: 28 MMOL/L (ref 21–32)
CREAT SERPL-MCNC: 0.97 MG/DL (ref 0.6–1.3)
ERYTHROCYTE [DISTWIDTH] IN BLOOD BY AUTOMATED COUNT: 16.8 % (ref 11.6–15.1)
GFR SERPL CREATININE-BSD FRML MDRD: 59 ML/MIN/1.73SQ M
GLUCOSE SERPL-MCNC: 95 MG/DL (ref 65–140)
HCT VFR BLD AUTO: 36.5 % (ref 34.8–46.1)
HGB BLD-MCNC: 10.7 G/DL (ref 11.5–15.4)
INR PPP: 2.34 (ref 0.84–1.19)
MCH RBC QN AUTO: 25.5 PG (ref 26.8–34.3)
MCHC RBC AUTO-ENTMCNC: 29.3 G/DL (ref 31.4–37.4)
MCV RBC AUTO: 87 FL (ref 82–98)
PLATELET # BLD AUTO: 453 THOUSANDS/UL (ref 149–390)
PMV BLD AUTO: 8.6 FL (ref 8.9–12.7)
POTASSIUM SERPL-SCNC: 3.3 MMOL/L (ref 3.5–5.3)
PROTHROMBIN TIME: 25 SECONDS (ref 11.6–14.5)
RBC # BLD AUTO: 4.2 MILLION/UL (ref 3.81–5.12)
SODIUM SERPL-SCNC: 134 MMOL/L (ref 135–147)
WBC # BLD AUTO: 8.59 THOUSAND/UL (ref 4.31–10.16)

## 2022-01-10 PROCEDURE — 80048 BASIC METABOLIC PNL TOTAL CA: CPT | Performed by: NURSE PRACTITIONER

## 2022-01-10 PROCEDURE — 99232 SBSQ HOSP IP/OBS MODERATE 35: CPT | Performed by: STUDENT IN AN ORGANIZED HEALTH CARE EDUCATION/TRAINING PROGRAM

## 2022-01-10 PROCEDURE — 99232 SBSQ HOSP IP/OBS MODERATE 35: CPT

## 2022-01-10 PROCEDURE — 85610 PROTHROMBIN TIME: CPT | Performed by: NURSE PRACTITIONER

## 2022-01-10 PROCEDURE — 85027 COMPLETE CBC AUTOMATED: CPT | Performed by: NURSE PRACTITIONER

## 2022-01-10 RX ADMIN — WARFARIN SODIUM 5 MG: 5 TABLET ORAL at 18:20

## 2022-01-10 RX ADMIN — LEVOTHYROXINE SODIUM 125 MCG: 25 TABLET ORAL at 05:52

## 2022-01-10 RX ADMIN — DULOXETINE HYDROCHLORIDE 20 MG: 20 CAPSULE, DELAYED RELEASE ORAL at 09:26

## 2022-01-10 RX ADMIN — SODIUM CHLORIDE 75 ML/HR: 0.9 INJECTION, SOLUTION INTRAVENOUS at 09:28

## 2022-01-10 RX ADMIN — Medication 125 MG: at 23:41

## 2022-01-10 RX ADMIN — TRIAMCINOLONE ACETONIDE: 1 CREAM TOPICAL at 12:33

## 2022-01-10 RX ADMIN — Medication 125 MG: at 18:20

## 2022-01-10 RX ADMIN — ALLOPURINOL 100 MG: 100 TABLET ORAL at 09:26

## 2022-01-10 RX ADMIN — Medication 125 MG: at 05:55

## 2022-01-10 RX ADMIN — Medication 125 MG: at 13:57

## 2022-01-10 RX ADMIN — TRIAMCINOLONE ACETONIDE: 1 CREAM TOPICAL at 18:21

## 2022-01-10 NOTE — PLAN OF CARE
Problem: Potential for Falls  Goal: Patient will remain free of falls  Description: INTERVENTIONS:  - Educate patient/family on patient safety including physical limitations  - Instruct patient to call for assistance with activity   - Consult OT/PT to assist with strengthening/mobility   - Keep Call bell within reach  - Keep bed low and locked with side rails adjusted as appropriate  - Keep care items and personal belongings within reach  - Initiate and maintain comfort rounds  - Make Fall Risk Sign visible to staff  - Offer Toileting every Hours, in advance of need  - Initiate/Maintain alarm  - Obtain necessary fall risk management equipment:   - Apply yellow socks and bracelet for high fall risk patients  - Consider moving patient to room near nurses   Outcome: Progressing

## 2022-01-10 NOTE — PROGRESS NOTES
Progress Note- Liyah Bello 80 y o  female MRN: [id number]    Unit/Bed#: -01 Encounter: [id number]      Assessment and Plan:    Ms Tomi Mathis is a 78-year-old female with PMH significant for morbid obesity, CHF, AFib on Coumadin, who was admitted for abdominal pain and diarrhea  CT abdomen pelvis IV contrast significant for thickening and inflammatory changes around the sigmoid colon, possibly representing colitis  Stool studies positive for C diff, likely due to recent antibiotic use for dental procedure  Leukocytosis has normalized and BMP with stable renal function  Recommended oral vancomycin 125 mg PO q6 hrs x10 days  If patient clinically improved, and can tolerate solid diet/oral medications, may be discharged with prescription for oral vancomycin  She may follow-up with GI outpatient to discuss CRC screening colonoscopy  - Continue clear liquid diet; Advance as tolerated  - Avoid lactose containing products  - Rx for oral vancomycin 125 mg PO q6 hrs x10 days  - Given thorough patient ed regarding how to prevent the transmission of C  Diff once discharged (thorough hand washing, bleach containing disinfectants, etc )  - Follow-up with GI outpatient to discuss for CRC screening    GI to sign-off at this time  Please contact with any questions or concerns  ______________________________________________________________________    Subjective:     Patient is a 80 y o  female found lying comfortably in bed  Admits to continued diarrhea, improved since admission  Admits to mild diffuse abdominal tenderness  Denies fevers/chills, nausea/vomiting, incontinence, or blood in stool/black-tarry stools  Tolerating clear liquid diet       Medication Administration - last 24 hours from 01/09/2022 1404 to 01/10/2022 1404       Date/Time Order Dose Route Action Action by     01/10/2022 0926 allopurinol (ZYLOPRIM) tablet 100 mg 100 mg Oral Given Brianne Park RN     01/10/2022 0926 DULoxetine (CYMBALTA) delayed release capsule 20 mg 20 mg Oral Given Elisabeth Combs RN     01/10/2022 0552 levothyroxine tablet 125 mcg 125 mcg Oral Given Houston Aguirre RN     01/09/2022 1726 warfarin (COUMADIN) tablet 2 mg 2 mg Oral Given Inés Jay RN     01/09/2022 1725 ciprofloxacin (CIPRO) IVPB (premix in 5% dextrose) 400 mg 200 mL 0 mg Intravenous Stopped Inés Jay RN     01/10/2022 1968 influenza vaccine, high-dose (FLUZONE HIGH-DOSE) IM injection JENNIFER 0 7 mL 0 7 mL Intramuscular Not Given Tatyana Call RN     01/10/2022 1233 triamcinolone (KENALOG) 0 1 % cream   Topical Given Elisabeth Combs RN     01/09/2022 1913 triamcinolone (KENALOG) 0 1 % cream 1 application Topical Given Houston Aguirre RN     01/10/2022 1127 sodium chloride 0 9 % infusion 75 mL/hr Intravenous New Bag Elisabeth Combs RN     01/09/2022 1816 sodium chloride 0 9 % infusion 75 mL/hr Intravenous [address] Inés Jay RN     01/10/2022 1357 vancomycin (VANCOCIN) oral solution 125 mg 125 mg Oral Given Elisabeth Combs RN     01/10/2022 0555 vancomycin (VANCOCIN) oral solution 125 mg 125 mg Oral Given Houston Aguirre RN     01/09/2022 2333 vancomycin (VANCOCIN) oral solution 125 mg 125 mg Oral Given Houston Aguirre RN     01/09/2022 1726 vancomycin (VANCOCIN) oral solution 125 mg 125 mg Oral Given Inés Jay RN          Objective:     Vitals: Blood pressure 91/58, pulse 70, temperature 97 6 °F (36 4 °C), resp  rate 18, height 5' 5" (1 651 m), weight (!) 209 kg (460 lb 12 2 oz), SpO2 96 %  ,Body mass index is 76 67 kg/m²        Intake/Output Summary (Last 24 hours) at 1/10/2022 1404  Last data filed at 1/10/2022 0373  Gross per 24 hour   Intake 910 ml   Output 301 ml   Net 609 ml       Physical Exam:   General Appearance: Awake and alert, in no acute distress, super morbidly obese  Abdomen: Abd exam limited 2/2 body habitus, soft, +mild lower abdominal TTP, non-distended; bowel sounds normal; no masses or no organomegaly    Invasive Devices Report    Peripheral Intravenous Line            Peripheral IV 01/07/22 Right Antecubital 2 days                Lab Results:  Admission on 01/07/2022   Component Date Value    C difficile toxin by PCR 01/08/2022 Positive*    C difficile Toxins A+B, * 01/08/2022 Positive*    WBC 01/07/2022 10 15     RBC 01/07/2022 4 32     Hemoglobin 01/07/2022 11 1*    Hematocrit 01/07/2022 37 2     MCV 01/07/2022 86     MCH 01/07/2022 25 7*    MCHC 01/07/2022 29 8*    RDW 01/07/2022 16 2*    MPV 01/07/2022 8 4*    Platelets 74/53/1250 434*    nRBC 01/07/2022 0     Neutrophils Relative 01/07/2022 80*    Immat GRANS % 01/07/2022 0     Lymphocytes Relative 01/07/2022 11*    Monocytes Relative 01/07/2022 6     Eosinophils Relative 01/07/2022 3     Basophils Relative 01/07/2022 0     Neutrophils Absolute 01/07/2022 8 14*    Immature Grans Absolute 01/07/2022 0 03     Lymphocytes Absolute 01/07/2022 1 10     Monocytes Absolute 01/07/2022 0 56     Eosinophils Absolute 01/07/2022 0 28     Basophils Absolute 01/07/2022 0 04     Sodium 01/07/2022 135     Potassium 01/07/2022 3 8     Chloride 01/07/2022 98     CO2 01/07/2022 32     ANION GAP 01/07/2022 5     BUN 01/07/2022 16     Creatinine 01/07/2022 0 95     Glucose 01/07/2022 110     Calcium 01/07/2022 8 5     Corrected Calcium 01/07/2022 9 4     AST 01/07/2022 18     ALT 01/07/2022 11     Alkaline Phosphatase 01/07/2022 123*    Total Protein 01/07/2022 7 3     Albumin 01/07/2022 2 9*    Total Bilirubin 01/07/2022 1 13*    eGFR 01/07/2022 60     Blood Culture 01/07/2022 No Growth at 48 hrs   Blood Culture 01/07/2022 No Growth at 48 hrs       Protime 01/07/2022 23 1*    INR 01/07/2022 2 10*    PTT 01/07/2022 53*    LACTIC ACID 01/07/2022 1 0     Lipase 01/07/2022 23     Sodium 01/08/2022 135     Potassium 01/08/2022 3 8     Chloride 01/08/2022 99     CO2 01/08/2022 31     ANION GAP 01/08/2022 5     BUN 01/08/2022 15     Creatinine 01/08/2022 1 00     Glucose 01/08/2022 107     Glucose, Fasting 01/08/2022 107*    Calcium 01/08/2022 8 4     eGFR 01/08/2022 57     WBC 01/08/2022 12 23*    RBC 01/08/2022 4 30     Hemoglobin 01/08/2022 11 1*    Hematocrit 01/08/2022 37 0     MCV 01/08/2022 86     MCH 01/08/2022 25 8*    MCHC 01/08/2022 30 0*    RDW 01/08/2022 16 5*    MPV 01/08/2022 8 3*    Platelets 93/11/5710 407*    nRBC 01/08/2022 0     Neutrophils Relative 01/08/2022 81*    Immat GRANS % 01/08/2022 0     Lymphocytes Relative 01/08/2022 9*    Monocytes Relative 01/08/2022 6     Eosinophils Relative 01/08/2022 3     Basophils Relative 01/08/2022 1     Neutrophils Absolute 01/08/2022 10 00*    Immature Grans Absolute 01/08/2022 0 03     Lymphocytes Absolute 01/08/2022 1 08     Monocytes Absolute 01/08/2022 0 74     Eosinophils Absolute 01/08/2022 0 32     Basophils Absolute 01/08/2022 0 06     Procalcitonin 01/08/2022 0 07     Procalcitonin 01/09/2022 0 06     Sodium 01/09/2022 136     Potassium 01/09/2022 3 6     Chloride 01/09/2022 101     CO2 01/09/2022 30     ANION GAP 01/09/2022 5     BUN 01/09/2022 13     Creatinine 01/09/2022 0 94     Glucose 01/09/2022 95     Calcium 01/09/2022 8 0*    Corrected Calcium 01/09/2022 9 2     AST 01/09/2022 21     ALT 01/09/2022 11     Alkaline Phosphatase 01/09/2022 125*    Total Protein 01/09/2022 6 3*    Albumin 01/09/2022 2 5*    Total Bilirubin 01/09/2022 0 97     eGFR 01/09/2022 61     WBC 01/09/2022 7 90     RBC 01/09/2022 4 00     Hemoglobin 01/09/2022 10 3*    Hematocrit 01/09/2022 34 3*    MCV 01/09/2022 86     MCH 01/09/2022 25 8*    MCHC 01/09/2022 30 0*    RDW 01/09/2022 16 5*    MPV 01/09/2022 8 6*    Platelets 91/10/6796 395*    nRBC 01/09/2022 0     Neutrophils Relative 01/09/2022 77*    Immat GRANS % 01/09/2022 0     Lymphocytes Relative 01/09/2022 11*    Monocytes Relative 01/09/2022 7     Eosinophils Relative 01/09/2022 4     Basophils Relative 01/09/2022 1     Neutrophils Absolute 01/09/2022 6 10     Immature Grans Absolute 01/09/2022 0 02     Lymphocytes Absolute 01/09/2022 0 89     Monocytes Absolute 01/09/2022 0 52     Eosinophils Absolute 01/09/2022 0 33     Basophils Absolute 01/09/2022 0 04     WBC 01/10/2022 8 59     RBC 01/10/2022 4 20     Hemoglobin 01/10/2022 10 7*    Hematocrit 01/10/2022 36 5     MCV 01/10/2022 87     MCH 01/10/2022 25 5*    MCHC 01/10/2022 29 3*    RDW 01/10/2022 16 8*    Platelets 73/70/9055 453*    MPV 01/10/2022 8 6*    Sodium 01/10/2022 134*    Potassium 01/10/2022 3 3*    Chloride 01/10/2022 99     CO2 01/10/2022 28     ANION GAP 01/10/2022 7     BUN 01/10/2022 11     Creatinine 01/10/2022 0 97     Glucose 01/10/2022 95     Calcium 01/10/2022 8 3*    eGFR 01/10/2022 59     Protime 01/10/2022 25 0*    INR 01/10/2022 2 34*       Imaging Studies: I have personally reviewed pertinent imaging studies

## 2022-01-10 NOTE — PROGRESS NOTES
[facility] [address]  Progress Note - [facility] 1950, 80 y o  female MRN: [id number]  Unit/Bed#: -01 Encounter: [id number]  Primary Care Provider: ORACIO Stafford   Date and time admitted to hospital: 1/7/2022  2:19 PM    * C  difficile colitis  Assessment & Plan  · CT shows sigmoid inflammation   · PCR and EVERT positive for c  Diff   01/09/2022  · Cipro and flagyl transitioned to oral Vancomycin 125 mg q 6 hrs  · GI input appreciated  · Can be discharged tomorrow on oral vancomycin at the current dosing for total of  · Can follow-up as an outpatient in 2 weeks  · Diarrhea is improving - only 3 episodes so far today since 0700   · Will advance diet     Diarrhea of presumed infectious origin  Assessment & Plan  · Being treated as per above  · Patient has history of C diff in 2007 as well as irritable bowel- no prior colonoscopy   · GI input appreciated     Chronic diastolic congestive heart failure (HCC)  Assessment & Plan  Wt Readings from Last 3 Encounters:   01/08/22 (!) 209 kg (460 lb 12 2 oz)   11/18/21 (!) 172 kg (380 lb)   10/03/20 (!) 210 kg (462 lb 15 5 oz)     · Does not appear to be in volume overload at this time   · Monitor weight closely          Paroxysmal atrial fibrillation (HCC)  Assessment & Plan  · Continue pre-hospital Coumadin 2 mg alternating with 5 mg  · Currently rate is controlled   Not on any medication for rate control     Renal insufficiency  Assessment & Plan  · Creatinine appears to be baseline 0 9-1 1 mg/dL   · Currently at baseline  · Monitor renal function closely   · Avoid hypotension and nephrotoxins     Idiopathic chronic 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  Assessment & Plan  · Continue periosteal allopurinol 100 mg p o  daily    Other specified hypothyroidism  Assessment & Plan  · Continue pre-hospital levothyroxine 125 mcg p o  daily    Mild episode of recurrent major depressive disorder ([facility] [address] )  Assessment & Plan  · Continue pre-hospital Cymbalta 20 mg p o  daily    Morbid obesity with BMI of 60 0-69 9, adult ([facility])  Assessment & Plan  · Encourage weight loss and supportive care        VTE Pharmacologic Prophylaxis: VTE Score: 5 High Risk (Score >/= 5) - Pharmacological DVT Prophylaxis Ordered: warfarin (Coumadin)  Sequential Compression Devices Ordered  Patient Centered Rounds: I performed bedside rounds with nursing staff today  Discussions with Specialists or Other Care Team Provider:  Case Management, Gastroenterology    Education and Discussions with Family / Patient: Discussed plan of care with the patient at the bedside  All questions/concerns addressed to her satisfaction        Time Spent for Care: 30 minutes  More than 50% of total time spent on counseling and coordination of care as described above  Current Length of Stay: 2 day(s)  Current Patient Status: Inpatient   Certification Statement: The patient will continue to require additional inpatient hospital stay due to Infectious diarrhea  Discharge Plan: Anticipate discharge tomorrow to home with home services  Code Status: Level 1 - Full Code    Subjective:   Patient was seen and examined resting comfortably in bed  No acute events overnight  States that diarrhea is improving, as she has only had 3 episodes since 7:00 a m  This morning  Stool is more formed than it had been previously  Reports mild abdominal pain, but otherwise offers no other complaints  Objective:     Vitals:   Temp (24hrs), Av 6 °F (36 4 °C), Min:97 6 °F (36 4 °C), Max:97 7 °F (36 5 °C)    Temp:  [97 6 °F (36 4 °C)-97 7 °F (36 5 °C)] 97 6 °F (36 4 °C)  HR:  [70-76] 70  Resp:  [16-20] 18  BP: ()/(48-68) 91/58  SpO2:  [95 %-96 %] 96 %  Body mass index is 76 67 kg/m²  Input and Output Summary (last 24 hours):      Intake/Output Summary (Last 24 hours) at 1/10/2022 1530  Last data filed at 1/10/2022 0808  Gross per 24 hour   Intake 910 ml   Output 301 ml   Net 609 ml Physical Exam:   Physical Exam  Vitals and nursing note reviewed  Constitutional:       General: She is not in acute distress  Appearance: She is obese  She is not toxic-appearing  HENT:      Head: Normocephalic and atraumatic  Mouth/Throat:      Mouth: Mucous membranes are moist    Eyes:      Conjunctiva/sclera: Conjunctivae normal    Cardiovascular:      Rate and Rhythm: Normal rate  Rhythm irregular  Pulses: Normal pulses  Heart sounds: Normal heart sounds  Pulmonary:      Effort: Pulmonary effort is normal  No respiratory distress  Breath sounds: Normal breath sounds  No wheezing, rhonchi or rales  Abdominal:      General: Bowel sounds are normal  There is no distension  Palpations: Abdomen is soft  Tenderness: There is abdominal tenderness (Mild TTP diffusely)  There is no guarding or rebound  Musculoskeletal:      Cervical back: Neck supple  Right lower leg: No edema  Left lower leg: No edema  Skin:     General: Skin is warm and dry  Neurological:      Mental Status: She is alert and oriented to person, place, and time  Cranial Nerves: No cranial nerve deficit  Sensory: No sensory deficit  Motor: No weakness  Coordination: Coordination normal    Psychiatric:         Mood and Affect: Mood normal          Behavior: Behavior normal          Thought Content: Thought content normal           Additional Data:     Labs:  Results from last 7 days   Lab Units 01/10/22  0452 01/09/22  0545 01/09/22  0545   WBC Thousand/uL 8 59   < > 7 90   HEMOGLOBIN g/dL 10 7*   < > 10 3*   HEMATOCRIT % 36 5   < > 34 3*   PLATELETS Thousands/uL 453*   < > 395*   NEUTROS PCT %  --   --  77*   LYMPHS PCT %  --   --  11*   MONOS PCT %  --   --  7   EOS PCT %  --   --  4    < > = values in this interval not displayed       Results from last 7 days   Lab Units 01/10/22  0452 01/09/22  0545 01/09/22  0545   SODIUM mmol/L 134*   < > 136   POTASSIUM mmol/L 3 3* < > 3 6   CHLORIDE mmol/L 99   < > 101   CO2 mmol/L 28   < > 30   BUN mg/dL 11   < > 13   CREATININE mg/dL 0 97   < > 0 94   ANION GAP mmol/L 7   < > 5   CALCIUM mg/dL 8 3*   < > 8 0*   ALBUMIN g/dL  --   --  2 5*   TOTAL BILIRUBIN mg/dL  --   --  0 97   ALK PHOS U/L  --   --  125*   ALT U/L  --   --  11   AST U/L  --   --  21   GLUCOSE RANDOM mg/dL 95   < > 95    < > = values in this interval not displayed  Results from last 7 days   Lab Units 01/10/22  0452   INR  2 34*             Results from last 7 days   Lab Units 01/09/22  0545 01/08/22  1414 01/07/22  1524   LACTIC ACID mmol/L  --   --  1 0   PROCALCITONIN ng/ml 0 06 0 07  --        Lines/Drains:  Invasive Devices  Report    Peripheral Intravenous Line            Peripheral IV 01/07/22 Right Antecubital 3 days                      Imaging: Reviewed radiology reports from this admission including: CT abdomen/pelvis    Recent Cultures (last 7 days):   Results from last 7 days   Lab Units 01/08/22  0641 01/07/22  1522 01/07/22  1520   BLOOD CULTURE   --  No Growth at 48 hrs  No Growth at 48 hrs     C DIFF TOXIN B BY PCR  Positive*  --   --        Last 24 Hours Medication List:   Current Facility-Administered Medications   Medication Dose Route Frequency Provider Last Rate    acetaminophen  650 mg Oral Q6H PRN Julián Messing, PA-C      allopurinol  100 mg Oral Daily Julián Messing, PA-C      DULoxetine  20 mg Oral Daily Julián Messing, PA-C      influenza vaccine  0 7 mL Intramuscular Once Bibiana Delong MD      levothyroxine  125 mcg Oral Daily Julián Messing, PA-SURESH      ondansetron  4 mg Intravenous Q6H PRN Julián Messing, PA-SURESH      sodium chloride  75 mL/hr Intravenous Continuous ORACIO Montiel 75 mL/hr (01/10/22 6462)    triamcinolone   Topical BID Julián Messing, PA-SURESH      vancomycin  125 mg Oral Q6H [facility] ORACIO Montiel      warfarin  2 mg Oral Q48H Julián Messing, MYA      warfarin  5 mg Oral Q48H Julián Messing, PAELENI          Today, Patient Was Seen By: Skyler Miller PA-C    **Please Note: This note may have been constructed using a voice recognition system  **

## 2022-01-10 NOTE — ASSESSMENT & PLAN NOTE
· CT shows sigmoid inflammation   · PCR and EVERT positive for c  Diff   01/09/2022  · Cipro and flagyl transitioned to oral Vancomycin 125 mg q 6 hrs  · GI input appreciated  · Can be discharged tomorrow on oral vancomycin at the current dosing for total of  · Can follow-up as an outpatient in 2 weeks  · Diarrhea is improving - only 3 episodes so far today since 0700   · Will advance diet

## 2022-01-11 VITALS
HEART RATE: 67 BPM | OXYGEN SATURATION: 96 % | SYSTOLIC BLOOD PRESSURE: 112 MMHG | HEIGHT: 65 IN | RESPIRATION RATE: 22 BRPM | TEMPERATURE: 97.6 F | WEIGHT: 293 LBS | DIASTOLIC BLOOD PRESSURE: 49 MMHG | BODY MASS INDEX: 48.82 KG/M2

## 2022-01-11 LAB
ANION GAP SERPL CALCULATED.3IONS-SCNC: 4 MMOL/L (ref 4–13)
BUN SERPL-MCNC: 9 MG/DL (ref 5–25)
CALCIUM SERPL-MCNC: 8 MG/DL (ref 8.4–10.2)
CHLORIDE SERPL-SCNC: 103 MMOL/L (ref 96–108)
CO2 SERPL-SCNC: 29 MMOL/L (ref 21–32)
CREAT SERPL-MCNC: 0.9 MG/DL (ref 0.6–1.3)
ERYTHROCYTE [DISTWIDTH] IN BLOOD BY AUTOMATED COUNT: 16.8 % (ref 11.6–15.1)
GFR SERPL CREATININE-BSD FRML MDRD: 64 ML/MIN/1.73SQ M
GLUCOSE SERPL-MCNC: 88 MG/DL (ref 65–140)
HCT VFR BLD AUTO: 34.2 % (ref 34.8–46.1)
HGB BLD-MCNC: 10.3 G/DL (ref 11.5–15.4)
MCH RBC QN AUTO: 26 PG (ref 26.8–34.3)
MCHC RBC AUTO-ENTMCNC: 30.1 G/DL (ref 31.4–37.4)
MCV RBC AUTO: 86 FL (ref 82–98)
PLATELET # BLD AUTO: 432 THOUSANDS/UL (ref 149–390)
PMV BLD AUTO: 8.6 FL (ref 8.9–12.7)
POTASSIUM SERPL-SCNC: 3.7 MMOL/L (ref 3.5–5.3)
RBC # BLD AUTO: 3.96 MILLION/UL (ref 3.81–5.12)
SODIUM SERPL-SCNC: 136 MMOL/L (ref 135–147)
WBC # BLD AUTO: 6.72 THOUSAND/UL (ref 4.31–10.16)

## 2022-01-11 PROCEDURE — 85027 COMPLETE CBC AUTOMATED: CPT

## 2022-01-11 PROCEDURE — 99239 HOSP IP/OBS DSCHRG MGMT >30: CPT

## 2022-01-11 PROCEDURE — 80048 BASIC METABOLIC PNL TOTAL CA: CPT

## 2022-01-11 RX ORDER — VANCOMYCIN HYDROCHLORIDE 125 MG/1
125 CAPSULE ORAL 4 TIMES DAILY
Qty: 32 CAPSULE | Refills: 0 | Status: SHIPPED | OUTPATIENT
Start: 2022-01-11 | End: 2022-01-19

## 2022-01-11 RX ADMIN — TRIAMCINOLONE ACETONIDE: 1 CREAM TOPICAL at 08:06

## 2022-01-11 RX ADMIN — SODIUM CHLORIDE 75 ML/HR: 0.9 INJECTION, SOLUTION INTRAVENOUS at 05:40

## 2022-01-11 RX ADMIN — ALLOPURINOL 100 MG: 100 TABLET ORAL at 08:06

## 2022-01-11 RX ADMIN — DULOXETINE HYDROCHLORIDE 20 MG: 20 CAPSULE, DELAYED RELEASE ORAL at 08:06

## 2022-01-11 RX ADMIN — Medication 125 MG: at 12:20

## 2022-01-11 RX ADMIN — LEVOTHYROXINE SODIUM 125 MCG: 25 TABLET ORAL at 05:02

## 2022-01-11 RX ADMIN — Medication 125 MG: at 05:01

## 2022-01-11 NOTE — ASSESSMENT & PLAN NOTE
· CT shows sigmoid inflammation   · PCR and EVERT positive for c  Diff   01/09/2022  · Cipro and flagyl transitioned to oral Vancomycin 125 mg q 6 hrs  · GI input appreciated  · Can be discharged tomorrow on oral vancomycin at the current dosing for total of  · Can follow-up as an outpatient in 2 weeks  · Diarrhea has significantly improved, episodes over 4-5 hours  · No leukocytosis  Denies fever/chills, significant abdominal pain, vomiting  · Discussed with GI, who said she can follow-up as an outpatient in 2 weeks  · Discharge on oral vancomycin 125 mg every 6 hours for 8 additional days to complete a 10 day course

## 2022-01-11 NOTE — ASSESSMENT & PLAN NOTE
· Treated as per above  · Patient has history of C diff in 2007 as well as irritable bowel- no prior colonoscopy   · GI input appreciated - recommended follow-up as an outpatient for possible colonoscopy  · Medically stable for discharge

## 2022-01-11 NOTE — DISCHARGE INSTR - AVS FIRST PAGE
Dear Royce Egan,     It was our pleasure to care for you here at [facility], [address] [facility]  It is our hope that we were always able to exceed the expected standards for your care during your stay  You were hospitalized due to C diff colitis  You were cared for on the medical/surgical floor by Cassandra Stephen PA-C under the service of Ratna Walters, * with the [facility] who covers for your primary care physician (PCP), ORACIO Hardy, while you were hospitalized  If you have any questions or concerns related to this hospitalization, you may contact us at [phone]  For follow up as well as any medication refills, we recommend that you follow up with your primary care physician  A registered nurse will reach out to you by phone within a few days after your discharge to answer any additional questions that you may have after going home  However, at this time we provide for you here, the most important instructions / recommendations at discharge:     Notable Medication Adjustments -   Vancomycin 125 mg every 6 hours for the next 8 days (end 01/19/2022)  Testing Required after Discharge -   None  Important follow up information -   Please call the Gastroenterology office to schedule a follow-up appointment  Please follow-up with your PCP  Other Instructions -   Please follow a healthy lifestyle with diet and exercise  Please review this entire after visit summary as additional general instructions including medication list, appointments, activity, diet, any pertinent wound care, and other additional recommendations from your care team that may be provided for you        Sincerely,     Cassandra Stephen PA-C

## 2022-01-11 NOTE — ASSESSMENT & PLAN NOTE
Wt Readings from Last 3 Encounters:   01/08/22 (!) 209 kg (460 lb 12 2 oz)   11/18/21 (!) 172 kg (380 lb)   10/03/20 (!) 210 kg (462 lb 15 5 oz)     · Resume home diuretics

## 2022-01-11 NOTE — PLAN OF CARE
Problem: Potential for Falls  Goal: Patient will remain free of falls  Description: INTERVENTIONS:  - Educate patient/family on patient safety including physical limitations  - Instruct patient to call for assistance with activity   - Consult OT/PT to assist with strengthening/mobility   - Keep Call bell within reach  - Keep bed low and locked with side rails adjusted as appropriate  - Keep care items and personal belongings within reach  - Initiate and maintain comfort rounds  - Make Fall Risk Sign visible to staff  - Apply yellow socks and bracelet for high fall risk patients  - Consider moving patient to room near nurses station  Outcome: Progressing     Problem: MOBILITY - ADULT  Goal: Maintain or return to baseline ADL function  Description: INTERVENTIONS:  -  Assess patient's ability to carry out ADLs; assess patient's baseline for ADL function and identify physical deficits which impact ability to perform ADLs (bathing, care of mouth/teeth, toileting, grooming, dressing, etc )  - Assess/evaluate cause of self-care deficits   - Assess range of motion  - Assess patient's mobility; develop plan if impaired  - Assess patient's need for assistive devices and provide as appropriate  - Encourage maximum independence but intervene and supervise when necessary  - Involve family in performance of ADLs  - Assess for home care needs following discharge   - Consider OT consult to assist with ADL evaluation and planning for discharge  - Provide patient education as appropriate  Outcome: Progressing  Goal: Maintains/Returns to pre admission functional level  Description: INTERVENTIONS:  - Perform BMAT or MOVE assessment daily    - Set and communicate daily mobility goal to care team and patient/family/caregiver     - Collaborate with rehabilitation services on mobility goals if consulted  - Out of bed for toileting  - Record patient progress and toleration of activity level   Outcome: Progressing     Problem: Prexisting or High Potential for Compromised Skin Integrity  Goal: Skin integrity is maintained or improved  Description: INTERVENTIONS:  - Identify patients at risk for skin breakdown  - Assess and monitor skin integrity  - Assess and monitor nutrition and hydration status  - Monitor labs   - Assess for incontinence   - Turn and reposition patient  - Assist with mobility/ambulation  - Relieve pressure over bony prominences  - Avoid friction and shearing  - Provide appropriate hygiene as needed including keeping skin clean and dry  - Evaluate need for skin moisturizer/barrier cream  - Collaborate with interdisciplinary team   - Patient/family teaching  - Consider wound care consult   Outcome: Progressing     Problem: PAIN - ADULT  Goal: Verbalizes/displays adequate comfort level or baseline comfort level  Description: Interventions:  - Encourage patient to monitor pain and request assistance  - Assess pain using appropriate pain scale  - Administer analgesics based on type and severity of pain and evaluate response  - Implement non-pharmacological measures as appropriate and evaluate response  - Consider cultural and social influences on pain and pain management  - Notify physician/advanced practitioner if interventions unsuccessful or patient reports new pain  Outcome: Progressing     Problem: INFECTION - ADULT  Goal: Absence or prevention of progression during hospitalization  Description: INTERVENTIONS:  - Assess and monitor for signs and symptoms of infection  - Monitor lab/diagnostic results  - Monitor all insertion sites, i e  indwelling lines, tubes, and drains  - Monitor endotracheal if appropriate and nasal secretions for changes in amount and color  - Clubb appropriate cooling/warming therapies per order  - Administer medications as ordered  - Instruct and encourage patient and family to use good hand hygiene technique  - Identify and instruct in appropriate isolation precautions for identified infection/condition  Outcome: Progressing  Goal: Absence of fever/infection during neutropenic period  Description: INTERVENTIONS:  - Monitor WBC    Outcome: Progressing     Problem: SAFETY ADULT  Goal: Patient will remain free of falls  Description: INTERVENTIONS:  - Educate patient/family on patient safety including physical limitations  - Instruct patient to call for assistance with activity   - Consult OT/PT to assist with strengthening/mobility   - Keep Call bell within reach  - Keep bed low and locked with side rails adjusted as appropriate  - Keep care items and personal belongings within reach  - Initiate and maintain comfort rounds  - Make Fall Risk Sign visible to staff  - Apply yellow socks and bracelet for high fall risk patients  - Consider moving patient to room near nurses station  Outcome: Progressing  Goal: Maintain or return to baseline ADL function  Description: INTERVENTIONS:  -  Assess patient's ability to carry out ADLs; assess patient's baseline for ADL function and identify physical deficits which impact ability to perform ADLs (bathing, care of mouth/teeth, toileting, grooming, dressing, etc )  - Assess/evaluate cause of self-care deficits   - Assess range of motion  - Assess patient's mobility; develop plan if impaired  - Assess patient's need for assistive devices and provide as appropriate  - Encourage maximum independence but intervene and supervise when necessary  - Involve family in performance of ADLs  - Assess for home care needs following discharge   - Consider OT consult to assist with ADL evaluation and planning for discharge  - Provide patient education as appropriate  Outcome: Progressing  Goal: Maintains/Returns to pre admission functional level  Description: INTERVENTIONS:  - Perform BMAT or MOVE assessment daily    - Set and communicate daily mobility goal to care team and patient/family/caregiver     - Collaborate with rehabilitation services on mobility goals if consulted  - Out of bed for toileting  - Record patient progress and toleration of activity level   Outcome: Progressing     Problem: DISCHARGE PLANNING  Goal: Discharge to home or other facility with appropriate resources  Description: INTERVENTIONS:  - Identify barriers to discharge w/patient and caregiver  - Arrange for needed discharge resources and transportation as appropriate  - Identify discharge learning needs (meds, wound care, etc )  - Arrange for interpretive services to assist at discharge as needed  - Refer to Case Management Department for coordinating discharge planning if the patient needs post-hospital services based on physician/advanced practitioner order or complex needs related to functional status, cognitive ability, or social support system  Outcome: Progressing     Problem: Knowledge Deficit  Goal: Patient/family/caregiver demonstrates understanding of disease process, treatment plan, medications, and discharge instructions  Description: Complete learning assessment and assess knowledge base    Interventions:  - Provide teaching at level of understanding  - Provide teaching via preferred learning methods  Outcome: Progressing

## 2022-01-11 NOTE — ASSESSMENT & PLAN NOTE
· Discharged on home Coumadin 2 mg alternating with 5 mg  · Currently rate is controlled   Not on any medication for rate control

## 2022-01-11 NOTE — DISCHARGE SUMMARY
Toby 46  Discharge- Orishirley Sierra 1950, 80 y o  female MRN: [id number]  Unit/Bed#: -01 Encounter: [id number]  Primary Care Provider: ORACIO Olivas   Date and time admitted to hospital: 1/7/2022  2:19 PM    * C  difficile colitis  Assessment & Plan  · CT shows sigmoid inflammation   · PCR and EVERT positive for c  Diff   01/09/2022  · Cipro and flagyl transitioned to oral Vancomycin 125 mg q 6 hrs  · GI input appreciated  · Can be discharged tomorrow on oral vancomycin at the current dosing for total of  · Can follow-up as an outpatient in 2 weeks  · Diarrhea has significantly improved, episodes over 4-5 hours  · No leukocytosis  Denies fever/chills, significant abdominal pain, vomiting  · Discussed with GI, who said she can follow-up as an outpatient in 2 weeks  · Discharge on oral vancomycin 125 mg every 6 hours for 8 additional days to complete a 10 day course  Diarrhea of presumed infectious origin  Assessment & Plan  · Treated as per above  · Patient has history of C diff in 2007 as well as irritable bowel- no prior colonoscopy   · GI input appreciated - recommended follow-up as an outpatient for possible colonoscopy  · Medically stable for discharge  Chronic diastolic congestive heart failure [facility])  Assessment & Plan  Wt Readings from Last 3 Encounters:   01/08/22 (!) 209 kg (460 lb 12 2 oz)   11/18/21 (!) 172 kg (380 lb)   10/03/20 (!) 210 kg (462 lb 15 5 oz)     · Resume home diuretics        Paroxysmal atrial fibrillation (HCC)  Assessment & Plan  · Discharged on home Coumadin 2 mg alternating with 5 mg  · Currently rate is controlled   Not on any medication for rate control     Renal insufficiency  Assessment & Plan  · Creatinine appears to be baseline 0 9-1 1 mg/dL   · Currently at baseline    Idiopathic chronic 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  Assessment & Plan  · Discharged on home allopurinol 100 mg p o  daily    Other specified hypothyroidism  Assessment & Plan  · Discharged on home levothyroxine 125 mcg p o  daily    Mild episode of recurrent major depressive disorder ([facility] [address] )  Assessment & Plan  · Discharged on home Cymbalta 20 mg p o  daily    Morbid obesity with BMI of 60 0-69 9, adult [facility])  Assessment & Plan  · Encourage weight loss and supportive care      Medical Problems             Resolved Problems  Date Reviewed: 1/11/2022    None              Discharging Physician / Practitioner: José Antonio Briggs PA-C  PCP: Ellen Beltrán  Admission Date:   Admission Orders (From admission, onward)     Ordered        01/08/22 1535  Inpatient Admission  Once            01/08/22 0034  Place in Observation  Once                      Discharge Date: 01/11/22    Consultations During Hospital Stay:  · Gastroenterology  · Case management    Procedures Performed:   · None    Significant Findings / Test Results:   · CT abdomen/pelvis: Limited study due to patient body habitus  Thickening and inflammatory changes around the sigmoid colon which may represent colitis (infection versus inflammatory cannot rule out ischemia)    Incidental Findings:   · None     Test Results Pending at Discharge (will require follow up): · None     Outpatient Tests Requested:  · None    Complications:  None    Reason for Admission:  C diff colitis    Hospital Course:   Amina Page is a 80 y o  female patient with a past medical history significant for CKD, chronic diastolic CHF, morbid obesity, chronic gout, depression, hypothyroidism, AFib, irritable bowel syndrome, C diff infection 2017 who originally presented to the hospital on 1/7/2022 due to diarrhea and abdominal pain  Please refer to the initial history and physical as outlined by Olga Balderrama PA-C on 01/08/2022 for additional presenting features  In brief, patient tested positive for C diff during infectious workup of diarrhea    She was initially treated with Flagyl and Cipro, but was eventually transition to oral vancomycin  She was seen by Gastroenterology, and recommended following up as an outpatient for possible colonoscopy  During the course of her stay, diarrhea and abdominal pain significantly improved  She was medically stable for discharge on 01/11/2022  This is a brief discharge summary; please refer to the above assessment and plan and the remainder of the patient's medical history for further details  Please see above list of diagnoses and related plan for additional information  Condition at Discharge: good    Discharge Day Visit / Exam:   Subjective:  Patient was seen and examined resting comfortably in bed  No acute events overnight  Reports the diarrhea has improved, and now has episodes every 4-5 hours  Also states she still has mild abdominal pain but the pain is tolerable  Denies nausea/vomiting, fever/chills, bloody stools  Vitals: Blood Pressure: (!) 112/49 (01/11/22 0807)  Pulse: 67 (01/11/22 0807)  Temperature: 97 6 °F (36 4 °C) (01/11/22 0807)  Temp Source: Temporal (01/07/22 1420)  Respirations: 22 (01/10/22 1543)  Height: 5' 5" (165 1 cm) (01/08/22 1700)  Weight - Scale: (!) 209 kg (460 lb 12 2 oz) (01/08/22 1700)  SpO2: 96 % (01/11/22 0807)  Exam:   Physical Exam  Vitals and nursing note reviewed  Constitutional:       General: She is not in acute distress  Appearance: She is obese  She is not toxic-appearing  HENT:      Head: Normocephalic and atraumatic  Mouth/Throat:      Mouth: Mucous membranes are moist    Eyes:      Conjunctiva/sclera: Conjunctivae normal    Cardiovascular:      Rate and Rhythm: Normal rate and regular rhythm  Pulses: Normal pulses  Heart sounds: Normal heart sounds  Pulmonary:      Effort: Pulmonary effort is normal  No respiratory distress  Breath sounds: Normal breath sounds  No wheezing, rhonchi or rales  Abdominal:      General: Bowel sounds are normal  There is no distension        Palpations: Abdomen is soft  Tenderness: There is abdominal tenderness (Mild TTP)  There is no guarding or rebound  Comments: Abdomen obese   Musculoskeletal:      Cervical back: Neck supple  Right lower leg: No edema  Left lower leg: No edema  Skin:     General: Skin is warm and dry  Neurological:      Mental Status: She is alert and oriented to person, place, and time  Cranial Nerves: No cranial nerve deficit  Sensory: No sensory deficit  Motor: No weakness  Coordination: Coordination normal    Psychiatric:         Mood and Affect: Mood normal          Behavior: Behavior normal          Thought Content: Thought content normal           Discussion with Family: Patient declined call to   Discharge instructions/Information to patient and family:   See after visit summary for information provided to patient and family  Provisions for Follow-Up Care:  See after visit summary for information related to follow-up care and any pertinent home health orders  Disposition:   Home with [facility] (Reminder: Complete face to face encounter)    Planned Readmission:  None     Discharge Statement:  I spent 70 minutes discharging the patient  This time was spent on the day of discharge  I had direct contact with the patient on the day of discharge  Greater than 50% of the total time was spent examining patient, answering all patient questions, arranging and discussing plan of care with patient as well as directly providing post-discharge instructions  Additional time then spent on discharge activities  Discharge Medications:  See after visit summary for reconciled discharge medications provided to patient and/or family        **Please Note: This note may have been constructed using a voice recognition system**

## 2022-01-11 NOTE — CASE MANAGEMENT
Case Management Discharge Planning Note    Patient name Jayjay Brennan  Location /-01 MRN [id number]  : 1950 Date 2022       Current Admission Date: 2022  Current Admission Diagnosis:C  difficile colitis   Patient Active Problem List    Diagnosis Date Noted    C  difficile colitis 2022    Diarrhea of presumed infectious origin 2022    Generalized abdominal pain     Lymphedema of extremity 2021    Paroxysmal atrial fibrillation ([place] [address] ) 10/03/2020    Other specified hypothyroidism 10/03/2020    Mild episode of recurrent major depressive disorder ([place] [address] ) 10/03/2020    Idiopathic chronic 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 10/03/2020    Primary osteoarthritis involving multiple joints 10/03/2020    Morbid obesity with BMI of 60 0-69 9, adult ([place] [address] ) 10/03/2020    Chronic diastolic congestive heart failure ([place] [address] ) 10/03/2020    Gastroesophageal reflux disease without esophagitis 10/03/2020    Hx MRSA infection 2020    Left ovarian cyst 2017    Depression with anxiety 07/15/2017    Bilateral lower extremity edema 2017    Chronic pain disorder 2017    Anemia 2016    Ambulatory dysfunction 2016    CKD (chronic kidney disease) stage 3, GFR 30-59 ml/min ([facility]) 2016    Adjustment disorder 2013    Renal insufficiency 2013    Irritable bowel syndrome 2012    Left atrial enlargement 2012    Left ventricular hypertrophy 2012    Carpal tunnel syndrome 2012    Gout 2012    Hyperlipidemia 2012    Osteoarthritis 2012    Symptomatic menopausal or female climacteric states 2012      LOS (days): 3  Geometric Mean LOS (GMLOS) (days): 2 60  Days to GMLOS:-0 2     OBJECTIVE:  Risk of Unplanned Readmission Score: 15         Current admission status: Inpatient   [facility] [id number]  Phone: [phone] Fax: [phone]    Primary Care Provider: ORACIO Jacobo    Primary Insurance: MEDICARE  Secondary Insurance: A[employer]    DISCHARGE DETAILS:    Discharge planning discussed with[de-identified] Patient  Freedom of Choice: Yes  Comments - Freedom of Choice: Pt for d/c home today  Will resume care with [facility]  They are aware pt for d/c today  Pt aware and agreeable to d/c  Her nephew will clear her driveway of snow/ice prior to arrival  Pt caregiver will also be at the home when pt arrives  No other CM d/c needs identified at this time  Other Referral/Resources/Interventions Provided:  Interventions: [facility]      Treatment Team Recommendation: Home with [address] [facility]  Discharge Destination Plan[de-identified] Home with [facility] at Discharge : [facility]        [facility]  [id number]  ETA of Transport (Date): 01/11/22  ETA of Transport (Time): 1400       IMM Given (Date):: 01/11/22  IMM Given to[de-identified] Patient

## 2022-01-12 LAB
BACTERIA BLD CULT: NORMAL
BACTERIA BLD CULT: NORMAL

## 2022-01-13 ENCOUNTER — TELEPHONE (OUTPATIENT)
Dept: GASTROENTEROLOGY | Facility: CLINIC | Age: 72
End: 2022-01-13

## 2022-01-17 RX ORDER — DOXYCYCLINE HYCLATE 100 MG
TABLET ORAL
COMMUNITY
Start: 2021-11-10 | End: 2022-02-22 | Stop reason: HOSPADM

## 2022-01-19 ENCOUNTER — IMMUNIZATIONS (OUTPATIENT)
Dept: FAMILY MEDICINE CLINIC | Facility: HOSPITAL | Age: 72
End: 2022-01-19

## 2022-01-19 DIAGNOSIS — Z23 ENCOUNTER FOR IMMUNIZATION: Primary | ICD-10-CM

## 2022-01-19 PROCEDURE — 0001A COVID-19 PFIZER VACC 0.3 ML: CPT

## 2022-01-19 PROCEDURE — 91300 COVID-19 PFIZER VACC 0.3 ML: CPT

## 2022-01-20 RX ORDER — AZITHROMYCIN 250 MG/1
TABLET, FILM COATED ORAL
COMMUNITY
Start: 2021-12-23 | End: 2022-02-22 | Stop reason: HOSPADM

## 2022-01-20 NOTE — PROGRESS NOTES
[facility] - Outpatient Follow-up Note  Bhaskar Alvarenga 80 y o  female MRN: [id number]  Encounter: [id number]          ASSESSMENT AND PLAN:      1  C  difficile colitis  2  Colitis  3  Diarrhea, unspecified type    Patient was recently seen in the hospital for abdominal pain and diarrhea with a CT scan showing sigmoid inflammation  She was found to be C diff positive and was discharged on a course of vancomycin  She does report recently finishing the course and her diarrhea has improved  She reports about going 3 times a day and the stool is loose and brown but not watery  She reports occasional stabbing pain in her left lower quadrant but denies any black or bloody stools  She denies any upper GI symptoms  Patient was recommended to undergo colonoscopy outpatient  She states she would like to wait about 4-6 weeks  She has never had a colonoscopy or done Cologuard testing in the past   Patient is on Coumadin for AFib and will need clearance to be off for procedure  Process, risks and benefits discussed with patient, she is agreeable  - Ambulatory referral to Gastroenterology  - Colonoscopy; Future    Will see patient back after procedure   ______________________________________________________________________    SUBJECTIVE:  Bhaskar Alvarenga is a 64-year-old female with past medical history of CHF, AFib on Coumadin in morbid obesity that presents today for hospital follow-up via ambulance [place]  She was seen in the hospital for abdominal pain and diarrhea with a CT scan showing sigmoid inflammation  She was found to be C diff positive and was discharged on a course of vancomycin   She reports that she has recently finished the course of vancomycin and her diarrhea has improved  She does report going about 3 times a day and the stool is loose and brown but not watery  She reports that her pain is improved    She reports she will get a stabbing pain in her left lower quadrant on occasion  She denies any upper GI symptoms  Patient was recommended to undergo colonoscopy outpatient  Patient reports that she would like to wait about 4-6 weeks until she has the colonoscopy  Patient reports that she has never had a colonoscopy or done Cologuard testing in the past         REVIEW OF SYSTEMS:  Review of Systems   Constitutional: Negative for fatigue, fever and unexpected weight change  HENT: Negative for trouble swallowing  Respiratory: Negative for shortness of breath  Cardiovascular: Negative for chest pain  Gastrointestinal: Positive for abdominal pain (Occasional) and diarrhea  Negative for abdominal distention, anal bleeding, blood in stool, constipation, nausea, rectal pain and vomiting  Musculoskeletal: Positive for gait problem ( bed-bound)           Historical Information   Past Medical History:   Diagnosis Date    Atrial fibrillation ([place] [address] )     Bladder stone     C  difficile colitis     Cellulitis     [facility]cong[facility] failure[facility] )     Depression with anxiety     Disease of thyroid gland     Diverticulitis     Enterocolitis     History of abnormal cervical Pap smear     Kidney stone     Lymphedema     Menopause     Age 53    Morbid obesity with BMI of 70 and over, adult ([place] [address] )     MRSA (methicillin resistant Staphylococcus aureus)     abdominal wound    Osteoarthritis     Phlebitis     left lower leg    Spondylolysis      Past Surgical History:   Procedure Laterality Date    APPENDECTOMY      CARPAL TUNNEL RELEASE      CHOLECYSTECTOMY      CYSTOSCOPY      stent    FOOT SURGERY  1982    bone spur    KNEE SURGERY      WISDOM TOOTH EXTRACTION       Social History   Social History     Substance and Sexual Activity   Alcohol Use Never     Social History     Substance and Sexual Activity   Drug Use Never    Comment: As a teen - As per [employer]      Social History     Tobacco Use   Smoking Status Former Smoker   Smokeless Tobacco Never Used   Tobacco Comment    5 packs/day until 6 (age 16-20) - As per [employer]      Family History   Problem Relation Age of Onset    Heart failure Mother     Heart disease Mother     Lymphoma Mother     Kidney disease Father     Heart disease Father     Heart failure Father     Diabetes type II Father     Diabetes type II Sister     Diabetes type II Brother     Uterine cancer Paternal Aunt     Breast cancer Neg Hx     Colon cancer Neg Hx     Ovarian cancer Neg Hx        Meds/Allergies       Current Outpatient Medications:     acetaminophen (TYLENOL) 650 mg CR tablet    allopurinol (ZYLOPRIM) 100 mg tablet    azithromycin (ZITHROMAX) 250 mg tablet    doxycycline hyclate (VIBRA-TABS) 100 mg tablet    DULoxetine (CYMBALTA) 20 mg capsule    furosemide (LASIX) 40 mg tablet    levothyroxine 125 mcg tablet    nystatin (MYCOSTATIN) powder    triamcinolone (KENALOG) 0 1 % cream    warfarin (COUMADIN) 2 5 mg tablet    warfarin (COUMADIN) 4 mg tablet    Allergies   Allergen Reactions    Augmentin Es-600  [Amoxicillin-Pot Clavulanate]     Penicillins Other (See Comments)     Tolerates cefepime (11/18/21)    Sulfa Antibiotics Hives    Vitamin C [Ascorbate - Food Allergy]     Latex Rash and Edema           Objective     There were no vitals taken for this visit  There is no height or weight on file to calculate BMI  PHYSICAL EXAM:      General Appearance:   Alert, cooperative, no distress   HEENT:   Normocephalic, atraumatic, anicteric  Neck:  Supple, symmetrical, trachea midline   Lungs:   Clear to auscultation bilaterally; no rales, rhonchi or wheezing; respirations unlabored    Heart[de-identified]   Regular rate and rhythm; no murmur, rub, or gallop     Abdomen:   Soft, non-tender, non-distended; normal bowel sounds; no masses, no organomegaly    Genitalia:   Deferred    Rectal:   Deferred    Extremities:  No cyanosis, clubbing or edema    Skin:  No jaundice, rashes, or lesions             Lab Results:   No visits with results within 1 Day(s) from this visit  Latest known visit with results is:   Admission on 01/07/2022, Discharged on 01/11/2022   Component Date Value    C difficile toxin by PCR 01/08/2022 Positive*    C difficile Toxins A+B, * 01/08/2022 Positive*    WBC 01/07/2022 10 15     RBC 01/07/2022 4 32     Hemoglobin 01/07/2022 11 1*    Hematocrit 01/07/2022 37 2     MCV 01/07/2022 86     MCH 01/07/2022 25 7*    MCHC 01/07/2022 29 8*    RDW 01/07/2022 16 2*    MPV 01/07/2022 8 4*    Platelets 39/95/0337 434*    nRBC 01/07/2022 0     Neutrophils Relative 01/07/2022 80*    Immat GRANS % 01/07/2022 0     Lymphocytes Relative 01/07/2022 11*    Monocytes Relative 01/07/2022 6     Eosinophils Relative 01/07/2022 3     Basophils Relative 01/07/2022 0     Neutrophils Absolute 01/07/2022 8 14*    Immature Grans Absolute 01/07/2022 0 03     Lymphocytes Absolute 01/07/2022 1 10     Monocytes Absolute 01/07/2022 0 56     Eosinophils Absolute 01/07/2022 0 28     Basophils Absolute 01/07/2022 0 04     Sodium 01/07/2022 135     Potassium 01/07/2022 3 8     Chloride 01/07/2022 98     CO2 01/07/2022 32     ANION GAP 01/07/2022 5     BUN 01/07/2022 16     Creatinine 01/07/2022 0 95     Glucose 01/07/2022 110     Calcium 01/07/2022 8 5     Corrected Calcium 01/07/2022 9 4     AST 01/07/2022 18     ALT 01/07/2022 11     Alkaline Phosphatase 01/07/2022 123*    Total Protein 01/07/2022 7 3     Albumin 01/07/2022 2 9*    Total Bilirubin 01/07/2022 1 13*    eGFR 01/07/2022 60     Blood Culture 01/07/2022 No Growth After 5 Days   Blood Culture 01/07/2022 No Growth After 5 Days       Protime 01/07/2022 23 1*    INR 01/07/2022 2 10*    PTT 01/07/2022 53*    LACTIC ACID 01/07/2022 1 0     Lipase 01/07/2022 23     Sodium 01/08/2022 135     Potassium 01/08/2022 3 8     Chloride 01/08/2022 99     CO2 01/08/2022 31     ANION GAP 01/08/2022 5  BUN 01/08/2022 15     Creatinine 01/08/2022 1 00     Glucose 01/08/2022 107     Glucose, Fasting 01/08/2022 107*    Calcium 01/08/2022 8 4     eGFR 01/08/2022 57     WBC 01/08/2022 12 23*    RBC 01/08/2022 4 30     Hemoglobin 01/08/2022 11 1*    Hematocrit 01/08/2022 37 0     MCV 01/08/2022 86     MCH 01/08/2022 25 8*    MCHC 01/08/2022 30 0*    RDW 01/08/2022 16 5*    MPV 01/08/2022 8 3*    Platelets 49/30/8013 407*    nRBC 01/08/2022 0     Neutrophils Relative 01/08/2022 81*    Immat GRANS % 01/08/2022 0     Lymphocytes Relative 01/08/2022 9*    Monocytes Relative 01/08/2022 6     Eosinophils Relative 01/08/2022 3     Basophils Relative 01/08/2022 1     Neutrophils Absolute 01/08/2022 10 00*    Immature Grans Absolute 01/08/2022 0 03     Lymphocytes Absolute 01/08/2022 1 08     Monocytes Absolute 01/08/2022 0 74     Eosinophils Absolute 01/08/2022 0 32     Basophils Absolute 01/08/2022 0 06     Procalcitonin 01/08/2022 0 07     Procalcitonin 01/09/2022 0 06     Sodium 01/09/2022 136     Potassium 01/09/2022 3 6     Chloride 01/09/2022 101     CO2 01/09/2022 30     ANION GAP 01/09/2022 5     BUN 01/09/2022 13     Creatinine 01/09/2022 0 94     Glucose 01/09/2022 95     Calcium 01/09/2022 8 0*    Corrected Calcium 01/09/2022 9 2     AST 01/09/2022 21     ALT 01/09/2022 11     Alkaline Phosphatase 01/09/2022 125*    Total Protein 01/09/2022 6 3*    Albumin 01/09/2022 2 5*    Total Bilirubin 01/09/2022 0 97     eGFR 01/09/2022 61     WBC 01/09/2022 7 90     RBC 01/09/2022 4 00     Hemoglobin 01/09/2022 10 3*    Hematocrit 01/09/2022 34 3*    MCV 01/09/2022 86     MCH 01/09/2022 25 8*    MCHC 01/09/2022 30 0*    RDW 01/09/2022 16 5*    MPV 01/09/2022 8 6*    Platelets 41/84/1665 395*    nRBC 01/09/2022 0     Neutrophils Relative 01/09/2022 77*    Immat GRANS % 01/09/2022 0     Lymphocytes Relative 01/09/2022 11*    Monocytes Relative 01/09/2022 7     Eosinophils Relative 01/09/2022 4     Basophils Relative 01/09/2022 1     Neutrophils Absolute 01/09/2022 6 10     Immature Grans Absolute 01/09/2022 0 02     Lymphocytes Absolute 01/09/2022 0 89     Monocytes Absolute 01/09/2022 0 52     Eosinophils Absolute 01/09/2022 0 33     Basophils Absolute 01/09/2022 0 04     WBC 01/10/2022 8 59     RBC 01/10/2022 4 20     Hemoglobin 01/10/2022 10 7*    Hematocrit 01/10/2022 36 5     MCV 01/10/2022 87     MCH 01/10/2022 25 5*    MCHC 01/10/2022 29 3*    RDW 01/10/2022 16 8*    Platelets 32/83/3958 453*    MPV 01/10/2022 8 6*    Sodium 01/10/2022 134*    Potassium 01/10/2022 3 3*    Chloride 01/10/2022 99     CO2 01/10/2022 28     ANION GAP 01/10/2022 7     BUN 01/10/2022 11     Creatinine 01/10/2022 0 97     Glucose 01/10/2022 95     Calcium 01/10/2022 8 3*    eGFR 01/10/2022 59     Protime 01/10/2022 25 0*    INR 01/10/2022 2 34*    WBC 01/11/2022 6 72     RBC 01/11/2022 3 96     Hemoglobin 01/11/2022 10 3*    Hematocrit 01/11/2022 34 2*    MCV 01/11/2022 86     MCH 01/11/2022 26 0*    MCHC 01/11/2022 30 1*    RDW 01/11/2022 16 8*    Platelets 93/57/6501 432*    MPV 01/11/2022 8 6*    Sodium 01/11/2022 136     Potassium 01/11/2022 3 7     Chloride 01/11/2022 103     CO2 01/11/2022 29     ANION GAP 01/11/2022 4     BUN 01/11/2022 9     Creatinine 01/11/2022 0 90     Glucose 01/11/2022 88     Calcium 01/11/2022 8 0*    eGFR 01/11/2022 64          Radiology Results:   CT abdomen pelvis with contrast    Result Date: 1/7/2022  Narrative: CT ABDOMEN AND PELVIS WITH IV CONTRAST INDICATION:   Evaluate for colitis  October 3, 2020 COMPARISON:  None  TECHNIQUE:  CT examination of the abdomen and pelvis was performed  Axial, sagittal, and coronal 2D reformatted images were created from the source data and submitted for interpretation  Radiation dose length product (DLP) for this visit:  3520 mGy-cm     This examination, like all CT scans performed in the [facility], was performed utilizing techniques to minimize radiation dose exposure, including the use of iterative reconstruction and automated exposure control  IV Contrast:  100 mL of iohexol (OMNIPAQUE) Enteric Contrast:  Enteric contrast was not administered  FINDINGS: Study is limited due to streak artifact from patient's body habitus  ABDOMEN LOWER CHEST:  No clinically significant abnormality identified in the visualized lower chest  LIVER/BILIARY TREE:  Unremarkable  GALLBLADDER:  Gallbladder is surgically absent  SPLEEN:  Unremarkable  PANCREAS:  Unremarkable  ADRENAL GLANDS:  Unremarkable  KIDNEYS/URETERS:  Left kidney is atrophic  STOMACH AND BOWEL:  Thickening and inflammatory changes around the sigmoid colon is seen  Fluid-filled sigmoid colon is visualized  APPENDIX:  No findings to suggest appendicitis  ABDOMINOPELVIC CAVITY:  No ascites  No pneumoperitoneum  No lymphadenopathy  VESSELS:  Unremarkable for patient's age  PELVIS REPRODUCTIVE ORGANS:  Unremarkable for patient's age  URINARY BLADDER:  Unremarkable  ABDOMINAL WALL/INGUINAL REGIONS:  Small fat-containing umbilical wall hernia is seen    OSSEOUS STRUCTURES:  No acute fracture or destructive osseous lesion  Spinal degenerative changes are noted  Impression: Limited study due to patient body habitus  Thickening and inflammatory changes around the sigmoid colon which may represent colitis (infection versus inflammatory cannot rule out ischemia) The study was marked in EPIC for immediate notification     Workstation performed: [id number]

## 2022-01-21 ENCOUNTER — OFFICE VISIT (OUTPATIENT)
Dept: GASTROENTEROLOGY | Facility: CLINIC | Age: 72
End: 2022-01-21
Payer: MEDICARE

## 2022-01-21 VITALS
SYSTOLIC BLOOD PRESSURE: 125 MMHG | TEMPERATURE: 96 F | WEIGHT: 293 LBS | HEIGHT: 65 IN | HEART RATE: 70 BPM | BODY MASS INDEX: 48.82 KG/M2 | DIASTOLIC BLOOD PRESSURE: 63 MMHG

## 2022-01-21 DIAGNOSIS — R19.7 DIARRHEA, UNSPECIFIED TYPE: ICD-10-CM

## 2022-01-21 DIAGNOSIS — K52.9 COLITIS: Primary | ICD-10-CM

## 2022-01-21 DIAGNOSIS — A04.72 C. DIFFICILE COLITIS: ICD-10-CM

## 2022-01-21 PROCEDURE — 99214 OFFICE O/P EST MOD 30 MIN: CPT | Performed by: NURSE PRACTITIONER

## 2022-01-21 NOTE — PATIENT INSTRUCTIONS
Sent a  Message to the procedural  a message to schedule the patient in [place]  For 4-6 weeks and a follow up appointment with the same performing DR  Patient is on Coumadin and I gave instructions for the Clenpiq

## 2022-02-07 ENCOUNTER — IMMUNIZATIONS (OUTPATIENT)
Dept: FAMILY MEDICINE CLINIC | Facility: HOSPITAL | Age: 72
End: 2022-02-07

## 2022-02-07 DIAGNOSIS — Z23 ENCOUNTER FOR IMMUNIZATION: Primary | ICD-10-CM

## 2022-02-07 PROCEDURE — 91300 COVID-19 PFIZER VACC 0.3 ML: CPT

## 2022-02-07 PROCEDURE — 0002A COVID-19 PFIZER VACC 0.3 ML: CPT

## 2022-02-18 ENCOUNTER — HOSPITAL ENCOUNTER (INPATIENT)
Facility: HOSPITAL | Age: 72
LOS: 3 days | Discharge: HOME HEALTH | DRG: 372 | End: 2022-02-22
Attending: INTERNAL MEDICINE | Admitting: INTERNAL MEDICINE
Payer: MEDICARE

## 2022-02-18 DIAGNOSIS — A04.72 C. DIFFICILE COLITIS: Primary | ICD-10-CM

## 2022-02-18 DIAGNOSIS — I48.91 ATRIAL FIBRILLATION, UNSPECIFIED TYPE (HCC): ICD-10-CM

## 2022-02-18 DIAGNOSIS — E66.01 MORBID OBESITY WITH BMI OF 60.0-69.9, ADULT (HCC): Chronic | ICD-10-CM

## 2022-02-18 DIAGNOSIS — Z79.01 CHRONIC ANTICOAGULATION: ICD-10-CM

## 2022-02-18 PROBLEM — I48.20 CHRONIC ATRIAL FIBRILLATION (HCC): Status: ACTIVE | Noted: 2022-02-18

## 2022-02-18 LAB
2HR DELTA HS TROPONIN: -1 NG/L
ALBUMIN SERPL BCP-MCNC: 3.1 G/DL (ref 3.5–5)
ALP SERPL-CCNC: 111 U/L (ref 34–104)
ALT SERPL W P-5'-P-CCNC: 7 U/L (ref 7–52)
ANION GAP SERPL CALCULATED.3IONS-SCNC: 5 MMOL/L (ref 4–13)
APTT PPP: 60 SECONDS (ref 23–37)
AST SERPL W P-5'-P-CCNC: 15 U/L (ref 13–39)
BASOPHILS # BLD AUTO: 0.04 THOUSANDS/ΜL (ref 0–0.1)
BASOPHILS NFR BLD AUTO: 1 % (ref 0–1)
BILIRUB SERPL-MCNC: 0.99 MG/DL (ref 0.2–1)
BUN SERPL-MCNC: 20 MG/DL (ref 5–25)
CALCIUM ALBUM COR SERPL-MCNC: 9.5 MG/DL (ref 8.3–10.1)
CALCIUM SERPL-MCNC: 8.8 MG/DL (ref 8.4–10.2)
CARDIAC TROPONIN I PNL SERPL HS: 5 NG/L
CARDIAC TROPONIN I PNL SERPL HS: 6 NG/L
CHLORIDE SERPL-SCNC: 98 MMOL/L (ref 96–108)
CO2 SERPL-SCNC: 33 MMOL/L (ref 21–32)
CREAT SERPL-MCNC: 0.96 MG/DL (ref 0.6–1.3)
EOSINOPHIL # BLD AUTO: 0.24 THOUSAND/ΜL (ref 0–0.61)
EOSINOPHIL NFR BLD AUTO: 3 % (ref 0–6)
ERYTHROCYTE [DISTWIDTH] IN BLOOD BY AUTOMATED COUNT: 16.6 % (ref 11.6–15.1)
GFR SERPL CREATININE-BSD FRML MDRD: 60 ML/MIN/1.73SQ M
GLUCOSE SERPL-MCNC: 104 MG/DL (ref 65–140)
HCT VFR BLD AUTO: 37.9 % (ref 34.8–46.1)
HGB BLD-MCNC: 11.1 G/DL (ref 11.5–15.4)
IMM GRANULOCYTES # BLD AUTO: 0.03 THOUSAND/UL (ref 0–0.2)
IMM GRANULOCYTES NFR BLD AUTO: 0 % (ref 0–2)
INR PPP: 2.34 (ref 0.84–1.19)
LIPASE SERPL-CCNC: 26 U/L (ref 11–82)
LYMPHOCYTES # BLD AUTO: 1.24 THOUSANDS/ΜL (ref 0.6–4.47)
LYMPHOCYTES NFR BLD AUTO: 14 % (ref 14–44)
MCH RBC QN AUTO: 25.1 PG (ref 26.8–34.3)
MCHC RBC AUTO-ENTMCNC: 29.3 G/DL (ref 31.4–37.4)
MCV RBC AUTO: 86 FL (ref 82–98)
MONOCYTES # BLD AUTO: 0.48 THOUSAND/ΜL (ref 0.17–1.22)
MONOCYTES NFR BLD AUTO: 6 % (ref 4–12)
NEUTROPHILS # BLD AUTO: 6.6 THOUSANDS/ΜL (ref 1.85–7.62)
NEUTS SEG NFR BLD AUTO: 76 % (ref 43–75)
NRBC BLD AUTO-RTO: 0 /100 WBCS
PLATELET # BLD AUTO: 386 THOUSANDS/UL (ref 149–390)
PMV BLD AUTO: 8.2 FL (ref 8.9–12.7)
POTASSIUM SERPL-SCNC: 4.1 MMOL/L (ref 3.5–5.3)
PROT SERPL-MCNC: 7.7 G/DL (ref 6.4–8.4)
PROTHROMBIN TIME: 25 SECONDS (ref 11.6–14.5)
RBC # BLD AUTO: 4.43 MILLION/UL (ref 3.81–5.12)
SODIUM SERPL-SCNC: 136 MMOL/L (ref 135–147)
WBC # BLD AUTO: 8.63 THOUSAND/UL (ref 4.31–10.16)

## 2022-02-18 PROCEDURE — 99284 EMERGENCY DEPT VISIT MOD MDM: CPT | Performed by: INTERNAL MEDICINE

## 2022-02-18 PROCEDURE — 84484 ASSAY OF TROPONIN QUANT: CPT | Performed by: INTERNAL MEDICINE

## 2022-02-18 PROCEDURE — 85025 COMPLETE CBC W/AUTO DIFF WBC: CPT | Performed by: INTERNAL MEDICINE

## 2022-02-18 PROCEDURE — 99220 PR INITIAL OBSERVATION CARE/DAY 70 MINUTES: CPT | Performed by: NURSE PRACTITIONER

## 2022-02-18 PROCEDURE — 99214 OFFICE O/P EST MOD 30 MIN: CPT | Performed by: FAMILY MEDICINE

## 2022-02-18 PROCEDURE — 83690 ASSAY OF LIPASE: CPT | Performed by: INTERNAL MEDICINE

## 2022-02-18 PROCEDURE — 99285 EMERGENCY DEPT VISIT HI MDM: CPT

## 2022-02-18 PROCEDURE — 85610 PROTHROMBIN TIME: CPT | Performed by: INTERNAL MEDICINE

## 2022-02-18 PROCEDURE — 96360 HYDRATION IV INFUSION INIT: CPT

## 2022-02-18 PROCEDURE — 85730 THROMBOPLASTIN TIME PARTIAL: CPT | Performed by: INTERNAL MEDICINE

## 2022-02-18 PROCEDURE — 80053 COMPREHEN METABOLIC PANEL: CPT | Performed by: INTERNAL MEDICINE

## 2022-02-18 PROCEDURE — 36415 COLL VENOUS BLD VENIPUNCTURE: CPT | Performed by: INTERNAL MEDICINE

## 2022-02-18 RX ORDER — WARFARIN SODIUM 5 MG/1
5 TABLET ORAL EVERY OTHER DAY
Status: DISCONTINUED | OUTPATIENT
Start: 2022-02-19 | End: 2022-02-22 | Stop reason: HOSPADM

## 2022-02-18 RX ORDER — SODIUM CHLORIDE, SODIUM GLUCONATE, SODIUM ACETATE, POTASSIUM CHLORIDE, MAGNESIUM CHLORIDE, SODIUM PHOSPHATE, DIBASIC, AND POTASSIUM PHOSPHATE .53; .5; .37; .037; .03; .012; .00082 G/100ML; G/100ML; G/100ML; G/100ML; G/100ML; G/100ML; G/100ML
75 INJECTION, SOLUTION INTRAVENOUS CONTINUOUS
Status: DISCONTINUED | OUTPATIENT
Start: 2022-02-18 | End: 2022-02-20

## 2022-02-18 RX ORDER — ACETAMINOPHEN 325 MG/1
975 TABLET ORAL EVERY 8 HOURS PRN
Status: DISCONTINUED | OUTPATIENT
Start: 2022-02-18 | End: 2022-02-22 | Stop reason: HOSPADM

## 2022-02-18 RX ORDER — ALLOPURINOL 100 MG/1
100 TABLET ORAL DAILY
Status: DISCONTINUED | OUTPATIENT
Start: 2022-02-19 | End: 2022-02-22 | Stop reason: HOSPADM

## 2022-02-18 RX ORDER — NYSTATIN 100000 [USP'U]/G
POWDER TOPICAL 2 TIMES DAILY
Status: DISCONTINUED | OUTPATIENT
Start: 2022-02-18 | End: 2022-02-18

## 2022-02-18 RX ORDER — SODIUM CHLORIDE 9 MG/ML
125 INJECTION, SOLUTION INTRAVENOUS CONTINUOUS
Status: DISCONTINUED | OUTPATIENT
Start: 2022-02-18 | End: 2022-02-18

## 2022-02-18 RX ORDER — DULOXETIN HYDROCHLORIDE 20 MG/1
20 CAPSULE, DELAYED RELEASE ORAL DAILY
Status: DISCONTINUED | OUTPATIENT
Start: 2022-02-19 | End: 2022-02-22 | Stop reason: HOSPADM

## 2022-02-18 RX ORDER — TRIAMCINOLONE ACETONIDE 1 MG/G
CREAM TOPICAL 2 TIMES DAILY
Status: DISCONTINUED | OUTPATIENT
Start: 2022-02-18 | End: 2022-02-22 | Stop reason: HOSPADM

## 2022-02-18 RX ORDER — WARFARIN SODIUM 2 MG/1
2 TABLET ORAL EVERY OTHER DAY
Status: DISCONTINUED | OUTPATIENT
Start: 2022-02-18 | End: 2022-02-22 | Stop reason: HOSPADM

## 2022-02-18 RX ADMIN — WARFARIN SODIUM 2 MG: 2 TABLET ORAL at 21:59

## 2022-02-18 RX ADMIN — SODIUM CHLORIDE 125 ML/HR: 0.9 INJECTION, SOLUTION INTRAVENOUS at 16:02

## 2022-02-18 RX ADMIN — SODIUM CHLORIDE, SODIUM GLUCONATE, SODIUM ACETATE, POTASSIUM CHLORIDE, MAGNESIUM CHLORIDE, SODIUM PHOSPHATE, DIBASIC, AND POTASSIUM PHOSPHATE 75 ML/HR: .53; .5; .37; .037; .03; .012; .00082 INJECTION, SOLUTION INTRAVENOUS at 19:07

## 2022-02-18 RX ADMIN — Medication 125 MG: at 19:10

## 2022-02-18 NOTE — ASSESSMENT & PLAN NOTE
Wt Readings from Last 3 Encounters:   01/21/22 (!) 172 kg (380 lb)   01/08/22 (!) 209 kg (460 lb 12 2 oz)   11/18/21 (!) 172 kg (380 lb)     · In no acute exacerbation  · Will hold off on diuresis at this time due to significant amount of diarrhea  · Monitor daily weight  · Strict I&O

## 2022-02-18 NOTE — H&P
[facility]  H&P- Ricka Breath 1950, 80 y o  female MRN: [id number]  Unit/Bed#: ED 23 Encounter: [id number]  Primary Care Provider: ORACIO Perkins   Date and time admitted to hospital: 2/18/2022  3:20 PM    * C  difficile colitis  Assessment & Plan  · Significant diarrhea in the setting of persistent C diff infection   · The patient without leukocytosis and no acute blood loss anemia  · She was treated with 10-day course of oral vancomycin and outpatient lab today is positive PCR/EIA today   · GI input appreciated   · Will continue supportive care   · Clear liquid   · Oral vancomycin 125 mg q 6 hours while inpatient with extended taper course as an outpatient     Chronic atrial fibrillation ([facility] )  Assessment & Plan  · Rate is currently controlled  · The patient is not on any AV node blocker  · Continue anticoagulation with warfarin    CKD (chronic kidney disease) stage 3, GFR 30-59 ml/min [facility])  Assessment & Plan  Lab Results   Component Value Date    EGFR 60 02/18/2022    EGFR 64 01/11/2022    EGFR 59 01/10/2022    CREATININE 0 96 02/18/2022    CREATININE 0 90 01/11/2022    CREATININE 0 97 01/10/2022   · Baseline creatinine appears to be between 0 7-0 9 mg/dL  · Currently at baseline  · Monitor renal function closely  · Avoid hypotension and nephrotoxins    Ambulatory dysfunction  Assessment & Plan  · Patient is bed bound and have 24/7 aides at home     Chronic diastolic congestive heart failure ([facility] )  Assessment & Plan  Wt Readings from Last 3 Encounters:   01/21/22 (!) 172 kg (380 lb)   01/08/22 (!) 209 kg (460 lb 12 2 oz)   11/18/21 (!) 172 kg (380 lb)     · In no acute exacerbation  · Will hold off on diuresis at this time due to significant amount of diarrhea  · Monitor daily weight  · Strict I&O           Morbid obesity with BMI of 60 0-69 9, adult [facility])  Assessment & Plan  · May benefit from outpatient bariatric surgery referral    VTE Prophylaxis: Warfarin (Coumadin)  Code Status:  Full code  POLST: POLST is not applicable to this patient  Discussion with family:  None present during exam    Anticipated Length of Stay:  Patient will be admitted on an Observation basis with an anticipated length of stay of  < 2 midnights  Justification for Hospital Stay:  Persistent C difficile colitis    Chief Complaint:   Worsening diarrhea    History of Present Illness:    Dileep Enamorado is a 80 y o  female who presents with diarrhea  The patient with history of persistent C difficile colitis, atrial fibrillation and chronic kidney disease  The patient was treated with 10 day course of vancomycin without resolution of C difficile infection and continue to have up to 6-8 times of diarrhea daily  She described this as watery stools without any bright red blood or melena  The patient has generalized abdominal discomfort however without any nausea or vomiting  She denies any fevers or chills  She reports that she had her stool tested for C difficile today and it is positive  Review of Systems:  Review of Systems   Constitutional: Negative for activity change, appetite change, chills, diaphoresis and fever  HENT: Negative for congestion, ear pain, hearing loss, tinnitus and trouble swallowing  Eyes: Negative for photophobia, pain, discharge, itching and visual disturbance  Respiratory: Negative for cough, shortness of breath, wheezing and stridor  Cardiovascular: Positive for leg swelling  Negative for chest pain and palpitations  Gastrointestinal: Positive for diarrhea  Negative for abdominal pain, blood in stool, constipation, nausea and vomiting  Endocrine: Negative for cold intolerance, heat intolerance, polydipsia, polyphagia and polyuria  Genitourinary: Negative for difficulty urinating, dysuria, frequency, hematuria and urgency  Musculoskeletal: Negative for back pain, gait problem and neck stiffness     Skin: Negative for pallor, rash and wound  Allergic/Immunologic: Negative for environmental allergies, food allergies and immunocompromised state  Neurological: Negative for dizziness, tremors, speech difficulty, weakness, light-headedness, numbness and headaches  Hematological: Negative for adenopathy  Does not bruise/bleed easily  Psychiatric/Behavioral: Negative for confusion, hallucinations and sleep disturbance  Past Medical and Surgical History:   Past Medical History:   Diagnosis Date    Atrial fibrillation ([place] [address] )     Bladder stone     C  difficile colitis     Cellulitis     CHF (congestive heart failure) ([place] [address] )     Depression with anxiety     Disease of thyroid gland     Diverticulitis     Enterocolitis     History of abnormal cervical Pap smear     Kidney stone     Lymphedema     Menopause     Age 53    Morbid obesity with BMI of 70 and over, adult ([place] [address] )     MRSA (methicillin resistant Staphylococcus aureus)     abdominal wound    Osteoarthritis     Phlebitis     left lower leg    Spondylolysis        Past Surgical History:   Procedure Laterality Date    APPENDECTOMY      CARPAL TUNNEL RELEASE      CHOLECYSTECTOMY      CYSTOSCOPY      stent    FOOT SURGERY  1982    bone spur    KNEE SURGERY      WISDOM TOOTH EXTRACTION         Meds/Allergies:  Prior to Admission medications    Medication Sig Start Date End Date Taking?  Authorizing Provider   acetaminophen (TYLENOL) 650 mg CR tablet Take 650 mg by mouth every 8 (eight) hours    Historical Provider, MD   allopurinol (ZYLOPRIM) 100 mg tablet Take 1 tablet (100 mg total) by mouth daily 9/30/19   Oly Ram DO   azithromycin [facility]) 250 mg tablet  12/23/21   Historical Provider, MD   doxycycline hyclate (VIBRA-TABS) 100 mg tablet  11/10/21   Historical Provider, MD   DULoxetine (CYMBALTA) 20 mg capsule Take 1 capsule (20 mg total) by mouth daily 8/15/18   Oly Ram DO   furosemide (LASIX) 40 mg tablet Take 1 tablet (40 mg total) by mouth daily  Patient taking differently: Take 20 mg by mouth daily as needed Prn weight gain 10 pounds  8/23/19   Oly Ram DO   levothyroxine 125 mcg tablet Take 125 mcg by mouth daily    Historical Provider, MD   nystatin (MYCOSTATIN) powder Apply topically 2 (two) times a day 11/21/21   Slade Hargrove MD   triamcinolone (KENALOG) 0 1 % cream Apply 0 1 application topically 2 (two) times a day 12/16/21   Historical Provider, MD   warfarin (COUMADIN) 2 5 mg tablet Take 1 tablet (2 5 mg total) by mouth daily  Patient taking differently: Take 2 mg by mouth every other day Monday, Wednesday, Friday, sunday 1/2/20   Oly Ram DO   warfarin (COUMADIN) 4 mg tablet Take 1 tablet daily as directed  Patient taking differently: 5 mg every other day Tuesday, Thursday, saturday 4/26/18   Oly Ram DO     I have reviewed home medications with patient personally  Allergies:    Allergies   Allergen Reactions    Augmentin Es-600  [Amoxicillin-Pot Clavulanate]     Penicillins Other (See Comments)     Tolerates cefepime (11/18/21)    Sulfa Antibiotics Hives    Vitamin C [Ascorbate - Food Allergy]     Latex Rash and Edema       Social History:  Marital Status: Single   Occupation: n/a   Patient Pre-hospital Living Situation: alone   Patient Pre-hospital Level of Mobility: assist/bedbound   Patient Pre-hospital Diet Restrictions: cardiac   Substance Use History:   Social History     Substance and Sexual Activity   Alcohol Use Never     Social History     Tobacco Use   Smoking Status Former Smoker   Smokeless Tobacco Never Used   Tobacco Comment    5 packs/day until 5 (age 17-19) - As per [employer]      Social History     Substance and Sexual Activity   Drug Use Never    Comment: As a teen - As per [employer]        Family History:  I have reviewed the patients family history    Physical Exam:   Vitals:   Blood Pressure: 127/60 (02/18/22 1830)  Pulse: 62 (02/18/22 1830)  Temperature: 97 7 °F (36 5 °C) (02/18/22 1526)  Temp Source: Temporal (02/18/22 1526)  Respirations: 17 (02/18/22 1830)  SpO2: 96 % (02/18/22 1830)    Physical Exam  Vitals and nursing note reviewed  Constitutional:       General: She is not in acute distress  Appearance: Normal appearance  She is obese  She is ill-appearing  HENT:      Head: Normocephalic and atraumatic  Right Ear: External ear normal       Left Ear: External ear normal       Nose: Nose normal  No rhinorrhea  Mouth/Throat:      Mouth: Mucous membranes are moist       Pharynx: Oropharynx is clear  Eyes:      General:         Right eye: No discharge  Left eye: No discharge  Pupils: Pupils are equal, round, and reactive to light  Cardiovascular:      Rate and Rhythm: Normal rate and regular rhythm  Pulses: Normal pulses  Heart sounds: Normal heart sounds  No murmur heard  Pulmonary:      Effort: Pulmonary effort is normal  No respiratory distress  Breath sounds: Normal breath sounds  Abdominal:      General: Bowel sounds are normal  There is no distension  Palpations: Abdomen is soft  There is no mass  Tenderness: There is abdominal tenderness (lower abdomen)  Musculoskeletal:         General: No swelling or tenderness  Normal range of motion  Cervical back: Normal range of motion and neck supple  No muscular tenderness  Right lower leg: Edema present  Left lower leg: Edema present  Skin:     General: Skin is warm and dry  Capillary Refill: Capillary refill takes less than 2 seconds  Findings: No erythema or rash  Neurological:      General: No focal deficit present  Mental Status: She is alert and oriented to person, place, and time  Mental status is at baseline  Psychiatric:         Mood and Affect: Mood normal          Behavior: Behavior normal          Thought Content:  Thought content normal          Judgment: Judgment normal          Additional Data:   Lab Results: I have personally reviewed pertinent reports  Results from last 7 days   Lab Units 02/18/22  1602   WBC Thousand/uL 8 63   HEMOGLOBIN g/dL 11 1*   HEMATOCRIT % 37 9   PLATELETS Thousands/uL 386   NEUTROS PCT % 76*   LYMPHS PCT % 14   MONOS PCT % 6   EOS PCT % 3     Results from last 7 days   Lab Units 02/18/22  1602   SODIUM mmol/L 136   POTASSIUM mmol/L 4 1   CHLORIDE mmol/L 98   CO2 mmol/L 33*   BUN mg/dL 20   CREATININE mg/dL 0 96   CALCIUM mg/dL 8 8   ALK PHOS U/L 111*   ALT U/L 7   AST U/L 15     Results from last 7 days   Lab Units 02/18/22  1602   INR  2 34*               Imaging: I have personally reviewed pertinent reports  No orders to display       EKG, Pathology, and Other Studies Reviewed on Admission:   · EKG: n/a     Epic Records Reviewed: Yes     ** Please Note: This note has been constructed using a voice recognition system   **

## 2022-02-18 NOTE — ASSESSMENT & PLAN NOTE
· Rate is currently controlled  · The patient is not on any AV node blocker  · Continue anticoagulation with warfarin

## 2022-02-18 NOTE — ASSESSMENT & PLAN NOTE
Lab Results   Component Value Date    EGFR 60 02/18/2022    EGFR 64 01/11/2022    EGFR 59 01/10/2022    CREATININE 0 96 02/18/2022    CREATININE 0 90 01/11/2022    CREATININE 0 97 01/10/2022   · Baseline creatinine appears to be between 0 7-0 9 mg/dL  · Currently at baseline  · Monitor renal function closely  · Avoid hypotension and nephrotoxins

## 2022-02-18 NOTE — CONSULTS
Consultation - [facility]  Marge Arechiga 80 y o  female MRN: [id number]  Unit/Bed#: ED 23 Encounter: [id number]        Inpatient consult to gastroenterology  Consult performed by: Dennis Purcell PA-C  Consult ordered by: Fly Chavez MD          Reason for Consult / Principal Problem:     C  Diff colitis      ASSESSMENT AND PLAN:      Patient is a 18-year-old female with PMH significant for AFib on Coumadin, CHF, depression with anxiety, hypothyroidism, morbid obesity who presented to the ED on 02/18 with significant diarrhea in the setting of a persistent C diff infection  Previously treated with a ten-day course of vancomycin with persistent diarrhea and repeat C diff testing with positive PCR/EIA today  Labs on admission notable for overall stable hemoglobin (11 1) without leukocytosis  Therapeutic INR (2 34)  CMP with stable renal and hepatic function, although chronically elevated alk-phos (111)  Recommend supportive measures and resuming oral vancomycin 125 mg q6 hrs with extended taper as outpatient  - Continue IV fluids  - Clear liquid diet; Advance as tolerated  - Continue antiemetics and analgesics PRN  - Vancomycin 125 mg oral solution q6 hrs while inpatient with extended taper as outpatient    GI will continue to follow  ______________________________________________________________________    HPI: Patient is a 80 y o  female with PMH significant for AFib Coumadin, CHF, depression with anxiety, hypothyroidism, and morbid obesity who presents to the ED on 02/18 with persistent C diff infection  Patient was initially diagnosed with C diff s/p dental extraction requiring azithromycin  She was previously hospitalized on 01/07 for C diff with CTA/P showing thickened inflammatory changes of the sigmoid colon  She was initially treated with Cipro/Flagyl but transitioned to vancomycin 125 mg q6hrs for total 10 day course   She did follow up GI as an outpatient and reported improvement in symptoms, with approximately 3 loose stools daily - Was scheduled for colonoscopy in 4-6 weeks at that time  However, her sxs's worsened and she is currently having 6-8 or more bowel movements daily  Describes these bowel movements as watery diarrhea associated with diffuse abdominal cramping, denies noticing blood in her stool/black tarry stools  The patient had a repeat C diff which resulted today with positive PCR and EIA  Denies fever/chills or nausea/vomiting  Labs on admission notable for overall stable hemoglobin (11 1) without leukocytosis  Therapeutic INR (2 34)  CMP with stable renal and hepatic function, although chronically elevated alk-phos (111)  REVIEW OF SYSTEMS:    CONSTITUTIONAL: Denies any fever, chills, rigors, and weight loss  HEENT: No earache or tinnitus  Denies hearing loss or visual disturbances  CARDIOVASCULAR: No chest pain or palpitations  RESPIRATORY: Denies any cough, hemoptysis, shortness of breath or dyspnea on exertion  GASTROINTESTINAL: As noted in the History of Present Illness  GENITOURINARY: No problems with urination  Denies any hematuria or dysuria  NEUROLOGIC: No dizziness or vertigo, denies headaches  MUSCULOSKELETAL: Denies any muscle or joint pain  SKIN: Denies skin rashes or itching  ENDOCRINE: Denies excessive thirst  Denies intolerance to heat or cold  PSYCHOSOCIAL: Denies depression or anxiety  Denies any recent memory loss         Historical Information   Past Medical History:   Diagnosis Date    Atrial fibrillation ([facility] )     Bladder stone     C  difficile colitis     Cellulitis     CHF (congestive heart failure) ([facility] )     Depression with anxiety     Disease of thyroid gland     Diverticulitis     Enterocolitis     History of abnormal cervical Pap smear     Kidney stone     Lymphedema     Menopause     Age 53    Morbid obesity with BMI of 70 and over, adult ([facility] )     MRSA (methicillin resistant Staphylococcus aureus)     abdominal wound    Osteoarthritis     Phlebitis     left lower leg    Spondylolysis      Past Surgical History:   Procedure Laterality Date    APPENDECTOMY      CARPAL TUNNEL RELEASE      CHOLECYSTECTOMY      CYSTOSCOPY      stent    FOOT SURGERY  1982    bone spur    KNEE SURGERY      WISDOM TOOTH EXTRACTION       Social History   Social History     Substance and Sexual Activity   Alcohol Use Never     Social History     Substance and Sexual Activity   Drug Use Never    Comment: As a teen - As per [employer]      Social History     Tobacco Use   Smoking Status Former Smoker   Smokeless Tobacco Never Used   Tobacco Comment    5 packs/day until 5 (age 16-20) - As per [employer]      Family History   Problem Relation Age of Onset    Heart failure Mother     Heart disease Mother     Lymphoma Mother     Kidney disease Father     Heart disease Father     Heart failure Father     Diabetes type II Father     Diabetes type II Sister     Diabetes type II Brother     Uterine cancer Paternal Aunt     Breast cancer Neg Hx     Colon cancer Neg Hx     Ovarian cancer Neg Hx        Meds/Allergies     (Not in a hospital admission)    Current Facility-Administered Medications   Medication Dose Route Frequency    sodium chloride 0 9 % infusion  125 mL/hr Intravenous Continuous       Allergies   Allergen Reactions    Augmentin Es-600  [Amoxicillin-Pot Clavulanate]     Penicillins Other (See Comments)     Tolerates cefepime (11/18/21)    Sulfa Antibiotics Hives    Vitamin C [Ascorbate - Food Allergy]     Latex Rash and Edema           Objective     Blood pressure 140/69, pulse 70, temperature 97 7 °F (36 5 °C), temperature source Temporal, resp  rate 18, SpO2 99 %  There is no height or weight on file to calculate BMI  No intake or output data in the 24 hours ending 02/18/22 5064      PHYSICAL EXAM:      General Appearance:   Alert, cooperative, no distress;  Morbidly obese HEENT:   Normocephalic, atraumatic, anicteric  Neck:  Supple, symmetrical, trachea midline   Lungs:   Clear to auscultation bilaterally; no rales, rhonchi or wheezing; respirations unlabored    Heart[de-identified]   Regular rate and rhythm; no murmur, rub, or gallop  Abdomen:   Physical exam limited 2/2 body habitus; soft, +moderate diffuse abd TTP, non-distended; normal bowel sounds; no masses, no organomegaly    Genitalia:   Deferred    Rectal:   Deferred    Extremities:  No cyanosis, clubbing or edema    Pulses:  2+ and symmetric all extremities    Skin:  No jaundice, rashes, or lesions    Lymph nodes:  No palpable cervical lymphadenopathy        Lab Results:   Admission on 02/18/2022   Component Date Value    WBC 02/18/2022 8 63     RBC 02/18/2022 4 43     Hemoglobin 02/18/2022 11 1*    Hematocrit 02/18/2022 37 9     MCV 02/18/2022 86     MCH 02/18/2022 25 1*    MCHC 02/18/2022 29 3*    RDW 02/18/2022 16 6*    MPV 02/18/2022 8 2*    Platelets 20/52/1430 386     nRBC 02/18/2022 0     Neutrophils Relative 02/18/2022 76*    Immat GRANS % 02/18/2022 0     Lymphocytes Relative 02/18/2022 14     Monocytes Relative 02/18/2022 6     Eosinophils Relative 02/18/2022 3     Basophils Relative 02/18/2022 1     Neutrophils Absolute 02/18/2022 6 60     Immature Grans Absolute 02/18/2022 0 03     Lymphocytes Absolute 02/18/2022 1 24     Monocytes Absolute 02/18/2022 0 48     Eosinophils Absolute 02/18/2022 0 24     Basophils Absolute 02/18/2022 0 04     Protime 02/18/2022 25 0*    INR 02/18/2022 2 34*    PTT 02/18/2022 60*       Imaging Studies: I have personally reviewed pertinent imaging studies

## 2022-02-18 NOTE — ED PROVIDER NOTES
History  Chief Complaint   Patient presents with    Diarrhea     Patient reports diarrhea since December 27th  This 52-year-old morbidly obese female arrives to the emergency room complaining of diarrhea since December 27th  Patient was diagnosed with C diff status post dental extraction and being on Zithromax  Patient was started on vancomycin and was given vancomycin for 10-12 days 4 times a day to 50 mg without any resolution of the C diff   patient states she completed her course of vancomycin on January 20th and the patient states there was no change in her diarrhea  She states everything she eats goes right through her she moves her bowels 6 8 times a day or more  She describes watery stools, she denies any bright red blood per rectum or black tarry stools  She has diffuse abdominal cramping with some pain no nausea vomiting no fever chills  Patient states her C diff test came back today positive her repeat study    Patient has had multiple CT scans in the past several months, she was diagnosed with colitis  Patient has never had a colonoscopy, she is scheduled for colonoscopy in 6-8 weeks pending her current outcome             Prior to Admission Medications   Prescriptions Last Dose Informant Patient Reported? Taking?    DULoxetine (CYMBALTA) 20 mg capsule  Self No No   Sig: Take 1 capsule (20 mg total) by mouth daily   acetaminophen (TYLENOL) 650 mg CR tablet  Self Yes No   Sig: Take 650 mg by mouth every 8 (eight) hours   allopurinol (ZYLOPRIM) 100 mg tablet  Self No No   Sig: Take 1 tablet (100 mg total) by mouth daily   azithromycin (ZITHROMAX) 250 mg tablet  Self Yes No   doxycycline hyclate (VIBRA-TABS) 100 mg tablet  Self Yes No   furosemide (LASIX) 40 mg tablet  Self No No   Sig: Take 1 tablet (40 mg total) by mouth daily   Patient taking differently: Take 20 mg by mouth daily as needed Prn weight gain 10 pounds    levothyroxine 125 mcg tablet  Self Yes No   Sig: Take 125 mcg by mouth daily nystatin (MYCOSTATIN) powder  Self No No   Sig: Apply topically 2 (two) times a day   triamcinolone (KENALOG) 0 1 % cream  Self Yes No   Sig: Apply 0 1 application topically 2 (two) times a day   warfarin (COUMADIN) 2 5 mg tablet  Self No No   Sig: Take 1 tablet (2 5 mg total) by mouth daily   Patient taking differently: Take 2 mg by mouth every other day Monday, Wednesday, Friday, sunday    warfarin (COUMADIN) 4 mg tablet  Self No No   Sig: Take 1 tablet daily as directed   Patient taking differently: 5 mg every other day Tuesday, Thursday, saturday      Facility-Administered Medications: None       Past Medical History:   Diagnosis Date    Atrial fibrillation ([place] [address] )     Bladder stone     C  difficile colitis     Cellulitis     CHF (congestive heart failure) ([place] [address] )     Depression with anxiety     Disease of thyroid gland     Diverticulitis     Enterocolitis     History of abnormal cervical Pap smear     Kidney stone     Lymphedema     Menopause     Age 53    Morbid obesity with BMI of 70 and over, adult ([place] [address] )     MRSA (methicillin resistant Staphylococcus aureus)     abdominal wound    Osteoarthritis     Phlebitis     left lower leg    Spondylolysis        Past Surgical History:   Procedure Laterality Date    APPENDECTOMY      CARPAL TUNNEL RELEASE      CHOLECYSTECTOMY      CYSTOSCOPY      stent    FOOT SURGERY  1982    bone spur    KNEE SURGERY      WISDOM TOOTH EXTRACTION         Family History   Problem Relation Age of Onset    Heart failure Mother     Heart disease Mother     Lymphoma Mother     Kidney disease Father     Heart disease Father     Heart failure Father     Diabetes type II Father     Diabetes type II Sister     Diabetes type II Brother     Uterine cancer Paternal Aunt     Breast cancer Neg Hx     Colon cancer Neg Hx     Ovarian cancer Neg Hx      I have reviewed and agree with the history as documented      E-Cigarette/Vaping    E-Cigarette Use Never User E-Cigarette/Vaping Substances    Nicotine No     THC No     CBD No     Flavoring No     Other No     Unknown No      Social History     Tobacco Use    Smoking status: Former Smoker    Smokeless tobacco: Never Used    Tobacco comment: 5 packs/day until 6 (age 16-19) - As per [employer]    Vaping Use    Vaping Use: Never used   Substance Use Topics    Alcohol use: Never    Drug use: Never     Comment: As a teen - As per [employer]        Review of Systems   Constitutional: Negative  HENT: Negative  Respiratory: Negative  Cardiovascular: Negative  Gastrointestinal: Positive for diarrhea  Genitourinary: Negative  Skin: Negative  Neurological: Negative  Hematological: Negative  Psychiatric/Behavioral: Negative  Physical Exam  Physical Exam  Vitals and nursing note reviewed  Constitutional:       General: She is not in acute distress  Appearance: She is obese  She is not ill-appearing, toxic-appearing or diaphoretic  HENT:      Head: Normocephalic and atraumatic  Eyes:      Extraocular Movements: Extraocular movements intact  Cardiovascular:      Rate and Rhythm: Normal rate and regular rhythm  Pulmonary:      Effort: Pulmonary effort is normal       Breath sounds: Normal breath sounds  Musculoskeletal:      Cervical back: Normal range of motion and neck supple  Skin:     General: Skin is warm and dry  Neurological:      General: No focal deficit present  Mental Status: She is oriented to person, place, and time  Mental status is at baseline  Psychiatric:         Mood and Affect: Mood normal          Thought Content:  Thought content normal          Judgment: Judgment normal          Vital Signs  ED Triage Vitals [02/18/22 1526]   Temperature Pulse Respirations Blood Pressure SpO2   97 7 °F (36 5 °C) 70 18 140/69 99 %      Temp Source Heart Rate Source Patient Position - Orthostatic VS BP Location FiO2 (%)   Temporal Monitor Lying Left arm -- Pain Score       --           Vitals:    02/18/22 1526   BP: 140/69   Pulse: 70   Patient Position - Orthostatic VS: Lying         Visual Acuity      ED Medications  Medications   sodium chloride 0 9 % infusion (125 mL/hr Intravenous New Bag 2/18/22 1602)       Diagnostic Studies  Results Reviewed     Procedure Component Value Units Date/Time    HS Troponin 0hr (reflex protocol) [[id number]]  (Normal) Collected: 02/18/22 1602    Lab Status: Final result Specimen: Blood from Arm, Right Updated: 02/18/22 1637     hs TnI 0hr 6 ng/L     HS Troponin I 2hr [[id number]]     Lab Status: No result Specimen: Blood     HS Troponin I 4hr [[id number]]     Lab Status: No result Specimen: Blood     Lipase [[id number]]  (Normal) Collected: 02/18/22 1602    Lab Status: Final result Specimen: Blood from Arm, Right Updated: 02/18/22 1630     Lipase 26 u/L     Comprehensive metabolic panel [[id number]]  (Abnormal) Collected: 02/18/22 1602    Lab Status: Final result Specimen: Blood from Arm, Right Updated: 02/18/22 1630     Sodium 136 mmol/L      Potassium 4 1 mmol/L      Chloride 98 mmol/L      CO2 33 mmol/L      ANION GAP 5 mmol/L      BUN 20 mg/dL      Creatinine 0 96 mg/dL      Glucose 104 mg/dL      Calcium 8 8 mg/dL      Corrected Calcium 9 5 mg/dL      AST 15 U/L      ALT 7 U/L      Alkaline Phosphatase 111 U/L      Total Protein 7 7 g/dL      Albumin 3 1 g/dL      Total Bilirubin 0 99 mg/dL      eGFR 60 ml/min/1 73sq m     Narrative:      [facility] guidelines for Chronic Kidney Disease (CKD):     Stage 1 with normal or high GFR (GFR > 90 mL/min/1 73 square meters)    Stage 2 Mild CKD (GFR = 60-89 mL/min/1 73 square meters)    Stage 3A Moderate CKD (GFR = 45-59 mL/min/1 73 square meters)    Stage 3B Moderate CKD (GFR = 30-44 mL/min/1 73 square meters)    Stage 4 Severe CKD (GFR = 15-29 mL/min/1 73 square meters)    Stage 5 End Stage CKD (GFR <15 mL/min/1 73 square meters)  Note: GFR calculation is accurate only with a steady state creatinine    Protime-INR [[id number]]  (Abnormal) Collected: 02/18/22 1602    Lab Status: Final result Specimen: Blood from Arm, Right Updated: 02/18/22 1626     Protime 25 0 seconds      INR 2 34    APTT [[id number]]  (Abnormal) Collected: 02/18/22 1602    Lab Status: Final result Specimen: Blood from Arm, Right Updated: 02/18/22 1626     PTT 60 seconds     CBC and differential [[id number]]  (Abnormal) Collected: 02/18/22 1602    Lab Status: Final result Specimen: Blood from Arm, Right Updated: 02/18/22 1610     WBC 8 63 Thousand/uL      RBC 4 43 Million/uL      Hemoglobin 11 1 g/dL      Hematocrit 37 9 %      MCV 86 fL      MCH 25 1 pg      MCHC 29 3 g/dL      RDW 16 6 %      MPV 8 2 fL      Platelets 769 Thousands/uL      nRBC 0 /100 WBCs      Neutrophils Relative 76 %      Immat GRANS % 0 %      Lymphocytes Relative 14 %      Monocytes Relative 6 %      Eosinophils Relative 3 %      Basophils Relative 1 %      Neutrophils Absolute 6 60 Thousands/µL      Immature Grans Absolute 0 03 Thousand/uL      Lymphocytes Absolute 1 24 Thousands/µL      Monocytes Absolute 0 48 Thousand/µL      Eosinophils Absolute 0 24 Thousand/µL      Basophils Absolute 0 04 Thousands/µL     UA w Reflex to Microscopic w Reflex to Culture [[id number]]     Lab Status: No result Specimen: Urine                  No orders to display              Procedures  Procedures         ED Course                               SBIRT 20yo+      Most Recent Value   SBIRT (22 yo +)    In order to provide better care to our patients, we are screening all of our patients for alcohol and drug use  Would it be okay to ask you these screening questions? Yes Filed at: 02/18/2022 1550   Initial Alcohol Screen: US AUDIT-C     1  How often do you have a drink containing alcohol? 0 Filed at: 02/18/2022 1550   2  How many drinks containing alcohol do you have on a typical day you are drinking? 0 Filed at: 02/18/2022 1550   3a   Male UNDER 65: How often do you have five or more drinks on one occasion? 0 Filed at: 02/18/2022 1550   3b  FEMALE Any Age, or MALE 65+: How often do you have 4 or more drinks on one occassion? 0 Filed at: 02/18/2022 1550   Audit-C Score 0 Filed at: 02/18/2022 1550   JYOTI: How many times in the past year have you    Used an illegal drug or used a prescription medication for non-medical reasons? Never Filed at: 02/18/2022 1550                    MDM    Disposition  Final diagnoses:   C  difficile colitis     Time reflects when diagnosis was documented in both MDM as applicable and the Disposition within this note     Time User Action Codes Description Comment    2/18/2022  4:10 PM Odette Olivares Add [A04 72] C  difficile colitis       ED Disposition     ED Disposition Condition Date/Time Comment    Admit Stable Fri Feb 18, 2022  5:19 PM Case was discussed with PA  and the patient's admission status was agreed to be  to the service of Dr Anabel Buerger  Follow-up Information    None         Patient's Medications   Discharge Prescriptions    No medications on file       No discharge procedures on file      PDMP Review     None          ED Provider  Electronically Signed by           Carlee Barney MD  02/18/22 2016

## 2022-02-18 NOTE — ASSESSMENT & PLAN NOTE
· Significant diarrhea in the setting of persistent C diff infection   · The patient without leukocytosis and no acute blood loss anemia  · She was treated with 10-day course of oral vancomycin and outpatient lab today is positive PCR/EIA today   · GI input appreciated   · Will continue supportive care   · Clear liquid   · Oral vancomycin 125 mg q 6 hours while inpatient with extended taper course as an outpatient

## 2022-02-19 LAB
ANION GAP SERPL CALCULATED.3IONS-SCNC: 6 MMOL/L (ref 4–13)
BILIRUB UR QL STRIP: NEGATIVE
BUN SERPL-MCNC: 19 MG/DL (ref 5–25)
CALCIUM SERPL-MCNC: 8.9 MG/DL (ref 8.4–10.2)
CHLORIDE SERPL-SCNC: 99 MMOL/L (ref 96–108)
CLARITY UR: CLEAR
CO2 SERPL-SCNC: 32 MMOL/L (ref 21–32)
COLOR UR: YELLOW
CREAT SERPL-MCNC: 0.86 MG/DL (ref 0.6–1.3)
ERYTHROCYTE [DISTWIDTH] IN BLOOD BY AUTOMATED COUNT: 16.5 % (ref 11.6–15.1)
GFR SERPL CREATININE-BSD FRML MDRD: 68 ML/MIN/1.73SQ M
GLUCOSE SERPL-MCNC: 107 MG/DL (ref 65–140)
GLUCOSE UR STRIP-MCNC: NEGATIVE MG/DL
HCT VFR BLD AUTO: 35.4 % (ref 34.8–46.1)
HGB BLD-MCNC: 10.3 G/DL (ref 11.5–15.4)
HGB UR QL STRIP.AUTO: NEGATIVE
INR PPP: 2.16 (ref 0.84–1.19)
KETONES UR STRIP-MCNC: NEGATIVE MG/DL
LEUKOCYTE ESTERASE UR QL STRIP: NEGATIVE
MCH RBC QN AUTO: 24.9 PG (ref 26.8–34.3)
MCHC RBC AUTO-ENTMCNC: 29.1 G/DL (ref 31.4–37.4)
MCV RBC AUTO: 86 FL (ref 82–98)
NITRITE UR QL STRIP: NEGATIVE
PH UR STRIP.AUTO: 6 [PH]
PLATELET # BLD AUTO: 382 THOUSANDS/UL (ref 149–390)
PMV BLD AUTO: 8.2 FL (ref 8.9–12.7)
POTASSIUM SERPL-SCNC: 4.4 MMOL/L (ref 3.5–5.3)
PROCALCITONIN SERPL-MCNC: 0.05 NG/ML
PROT UR STRIP-MCNC: NEGATIVE MG/DL
PROTHROMBIN TIME: 23.6 SECONDS (ref 11.6–14.5)
RBC # BLD AUTO: 4.13 MILLION/UL (ref 3.81–5.12)
SODIUM SERPL-SCNC: 137 MMOL/L (ref 135–147)
SP GR UR STRIP.AUTO: 1.02 (ref 1–1.03)
UROBILINOGEN UR QL STRIP.AUTO: 0.2 E.U./DL
WBC # BLD AUTO: 9.56 THOUSAND/UL (ref 4.31–10.16)

## 2022-02-19 PROCEDURE — 85610 PROTHROMBIN TIME: CPT | Performed by: NURSE PRACTITIONER

## 2022-02-19 PROCEDURE — 85027 COMPLETE CBC AUTOMATED: CPT | Performed by: NURSE PRACTITIONER

## 2022-02-19 PROCEDURE — 36415 COLL VENOUS BLD VENIPUNCTURE: CPT | Performed by: NURSE PRACTITIONER

## 2022-02-19 PROCEDURE — 84145 PROCALCITONIN (PCT): CPT | Performed by: NURSE PRACTITIONER

## 2022-02-19 PROCEDURE — 81003 URINALYSIS AUTO W/O SCOPE: CPT | Performed by: NURSE PRACTITIONER

## 2022-02-19 PROCEDURE — 80048 BASIC METABOLIC PNL TOTAL CA: CPT | Performed by: NURSE PRACTITIONER

## 2022-02-19 PROCEDURE — 99232 SBSQ HOSP IP/OBS MODERATE 35: CPT | Performed by: INTERNAL MEDICINE

## 2022-02-19 RX ADMIN — SODIUM CHLORIDE, SODIUM GLUCONATE, SODIUM ACETATE, POTASSIUM CHLORIDE, MAGNESIUM CHLORIDE, SODIUM PHOSPHATE, DIBASIC, AND POTASSIUM PHOSPHATE 75 ML/HR: .53; .5; .37; .037; .03; .012; .00082 INJECTION, SOLUTION INTRAVENOUS at 22:06

## 2022-02-19 RX ADMIN — Medication 125 MG: at 17:47

## 2022-02-19 RX ADMIN — Medication 125 MG: at 23:06

## 2022-02-19 RX ADMIN — TRIAMCINOLONE ACETONIDE: 1 CREAM TOPICAL at 17:47

## 2022-02-19 RX ADMIN — Medication 125 MG: at 00:58

## 2022-02-19 RX ADMIN — Medication 125 MG: at 06:21

## 2022-02-19 RX ADMIN — LEVOTHYROXINE SODIUM 125 MCG: 25 TABLET ORAL at 06:20

## 2022-02-19 RX ADMIN — WARFARIN SODIUM 5 MG: 5 TABLET ORAL at 17:47

## 2022-02-19 RX ADMIN — Medication 125 MG: at 13:51

## 2022-02-19 RX ADMIN — DULOXETINE HYDROCHLORIDE 20 MG: 20 CAPSULE, DELAYED RELEASE ORAL at 08:52

## 2022-02-19 RX ADMIN — ALLOPURINOL 100 MG: 100 TABLET ORAL at 08:52

## 2022-02-19 NOTE — NURSING NOTE
Pt from er to room 215, pt oriented to the unit and the callbell, hourly rounding, pt presently in bed in no a cute distress watching tv

## 2022-02-19 NOTE — ASSESSMENT & PLAN NOTE
· Significant diarrhea in the setting of persistent C diff infection   · Patient completed 10 day course of oral vancomycin in January  · C diff positive again on 02/18/2022  · GI input appreciated   · Will continue supportive care   · Diet as tolerated  · Oral vancomycin 125 mg q 6 hours while inpatient with extended taper course as an outpatient   · Patient unable to be discharged today as she is not tolerating diet and has continue diarrhea

## 2022-02-19 NOTE — ASSESSMENT & PLAN NOTE
· No signs of acute exacerbation  · Will continue to monitor I&Os daily weight  · Diuretics held due to continued diarrhea

## 2022-02-19 NOTE — PLAN OF CARE
Problem: Potential for Falls  Goal: Patient will remain free of falls  Description: INTERVENTIONS:  - Educate patient/family on patient safety including physical limitations  - Instruct patient to call for assistance with activity   - Consult OT/PT to assist with strengthening/mobility   - Keep Call bell within reach  - Keep bed low and locked with side rails adjusted as appropriate  - Keep care items and personal belongings within reach  - Initiate and maintain comfort rounds  - Make Fall Risk Sign visible to staff  - Offer Toileting every 1 Hours, in advance of need  - Initiate/Maintain bed alarm  - Obtain necessary fall risk management equipment: bed   - Apply yellow socks and bracelet for high fall risk patients  - Consider moving patient to room near nurses station  Outcome: Progressing     Problem: MOBILITY - ADULT  Goal: Maintain or return to baseline ADL function  Description: INTERVENTIONS:  -  Assess patient's ability to carry out ADLs; assess patient's baseline for ADL function and identify physical deficits which impact ability to perform ADLs (bathing, care of mouth/teeth, toileting, grooming, dressing, etc )  - Assess/evaluate cause of self-care deficits   - Assess range of motion  - Assess patient's mobility; develop plan if impaired  - Assess patient's need for assistive devices and provide as appropriate  - Encourage maximum independence but intervene and supervise when necessary  - Involve family in performance of ADLs  - Assess for home care needs following discharge   - Consider OT consult to assist with ADL evaluation and planning for discharge  - Provide patient education as appropriate  Outcome: Progressing  Goal: Maintains/Returns to pre admission functional level  Description: INTERVENTIONS:  - Perform BMAT or MOVE assessment daily    - Set and communicate daily mobility goal to care team and patient/family/caregiver     - Collaborate with rehabilitation services on mobility goals if consulted  - Perform Range of Motion 3 times a day  - Reposition patient every 2 hours    - Dangle patient 3 times a day  - Stand patient 3 times a day  - Ambulate patient 3 times a day  - Out of bed to chair 3 times a day   - Out of bed for meals 3 times a day  - Out of bed for toileting  - Record patient progress and toleration of activity level   Outcome: Progressing     Problem: Prexisting or High Potential for Compromised Skin Integrity  Goal: Skin integrity is maintained or improved  Description: INTERVENTIONS:  - Identify patients at risk for skin breakdown  - Assess and monitor skin integrity  - Assess and monitor nutrition and hydration status  - Monitor labs   - Assess for incontinence   - Turn and reposition patient  - Assist with mobility/ambulation  - Relieve pressure over bony prominences  - Avoid friction and shearing  - Provide appropriate hygiene as needed including keeping skin clean and dry  - Evaluate need for skin moisturizer/barrier cream  - Collaborate with interdisciplinary team   - Patient/family teaching  - Consider wound care consult   Outcome: Progressing     Problem: GASTROINTESTINAL - ADULT  Goal: Minimal or absence of nausea and/or vomiting  Description: INTERVENTIONS:  - Administer IV fluids if ordered to ensure adequate hydration  - Maintain NPO status until nausea and vomiting are resolved  - Nasogastric tube if ordered  - Administer ordered antiemetic medications as needed  - Provide nonpharmacologic comfort measures as appropriate  - Advance diet as tolerated, if ordered  - Consider nutrition services referral to assist patient with adequate nutrition and appropriate food choices  Outcome: Progressing  Goal: Maintains or returns to baseline bowel function  Description: INTERVENTIONS:  - Assess bowel function  - Encourage oral fluids to ensure adequate hydration  - Administer IV fluids if ordered to ensure adequate hydration  - Administer ordered medications as needed  - Encourage mobilization and activity  - Consider nutritional services referral to assist patient with adequate nutrition and appropriate food choices  Outcome: Progressing  Goal: Maintains adequate nutritional intake  Description: INTERVENTIONS:  - Monitor percentage of each meal consumed  - Identify factors contributing to decreased intake, treat as appropriate  - Assist with meals as needed  - Monitor I&O, weight, and lab values if indicated  - Obtain nutrition services referral as needed  Outcome: Progressing  Goal: Establish and maintain optimal ostomy function  Description: INTERVENTIONS:  - Assess bowel function  - Encourage oral fluids to ensure adequate hydration  - Administer IV fluids if ordered to ensure adequate hydration   - Administer ordered medications as needed  - Encourage mobilization and activity  - Nutrition services referral to assist patient with appropriate food choices  - Assess stoma site  - Consider wound care consult   Outcome: Progressing  Goal: Oral mucous membranes remain intact  Description: INTERVENTIONS  - Assess oral mucosa and hygiene practices  - Implement preventative oral hygiene regimen  - Implement oral medicated treatments as ordered  - Initiate Nutrition services referral as needed  Outcome: Progressing     Problem: PAIN - ADULT  Goal: Verbalizes/displays adequate comfort level or baseline comfort level  Description: Interventions:  - Encourage patient to monitor pain and request assistance  - Assess pain using appropriate pain scale  - Administer analgesics based on type and severity of pain and evaluate response  - Implement non-pharmacological measures as appropriate and evaluate response  - Consider cultural and social influences on pain and pain management  - Notify physician/advanced practitioner if interventions unsuccessful or patient reports new pain  Outcome: Progressing     Problem: INFECTION - ADULT  Goal: Absence or prevention of progression during hospitalization  Description: INTERVENTIONS:  - Assess and monitor for signs and symptoms of infection  - Monitor lab/diagnostic results  - Monitor all insertion sites, i e  indwelling lines, tubes, and drains  - Monitor endotracheal if appropriate and nasal secretions for changes in amount and color  - [place] appropriate cooling/warming therapies per order  - Administer medications as ordered  - Instruct and encourage patient and family to use good hand hygiene technique  - Identify and instruct in appropriate isolation precautions for identified infection/condition  Outcome: Progressing  Goal: Absence of fever/infection during neutropenic period  Description: INTERVENTIONS:  - Monitor WBC    Outcome: Progressing     Problem: SAFETY ADULT  Goal: Patient will remain free of falls  Description: INTERVENTIONS:  - Educate patient/family on patient safety including physical limitations  - Instruct patient to call for assistance with activity   - Consult OT/PT to assist with strengthening/mobility   - Keep Call bell within reach  - Keep bed low and locked with side rails adjusted as appropriate  - Keep care items and personal belongings within reach  - Initiate and maintain comfort rounds  - Make Fall Risk Sign visible to staff  - Offer Toileting every 2 Hours, in advance of need  - Initiate/Maintain bed alarm  - Obtain necessary fall risk management equipment: bed  - Apply yellow socks and bracelet for high fall risk patients  - Consider moving patient to room near nurses station  Outcome: Progressing  Goal: Maintain or return to baseline ADL function  Description: INTERVENTIONS:  -  Assess patient's ability to carry out ADLs; assess patient's baseline for ADL function and identify physical deficits which impact ability to perform ADLs (bathing, care of mouth/teeth, toileting, grooming, dressing, etc )  - Assess/evaluate cause of self-care deficits   - Assess range of motion  - Assess patient's mobility; develop plan if impaired  - Assess patient's need for assistive devices and provide as appropriate  - Encourage maximum independence but intervene and supervise when necessary  - Involve family in performance of ADLs  - Assess for home care needs following discharge   - Consider OT consult to assist with ADL evaluation and planning for discharge  - Provide patient education as appropriate  Outcome: Progressing  Goal: Maintains/Returns to pre admission functional level  Description: INTERVENTIONS:  - Perform BMAT or MOVE assessment daily    - Set and communicate daily mobility goal to care team and patient/family/caregiver  - Collaborate with rehabilitation services on mobility goals if consulted  - Perform Range of Motion 3 times a day  - Reposition patient every 2 hours    - Dangle patient 3 times a day  - Stand patient 3 times a day  - Ambulate patient 3 times a day  - Out of bed to chair 3 times a day   - Out of bed for meals 3 times a day  - Out of bed for toileting  - Record patient progress and toleration of activity level   Outcome: Progressing     Problem: DISCHARGE PLANNING  Goal: Discharge to home or other facility with appropriate resources  Description: INTERVENTIONS:  - Identify barriers to discharge w/patient and caregiver  - Arrange for needed discharge resources and transportation as appropriate  - Identify discharge learning needs (meds, wound care, etc )  - Arrange for interpretive services to assist at discharge as needed  - Refer to Case Management Department for coordinating discharge planning if the patient needs post-hospital services based on physician/advanced practitioner order or complex needs related to functional status, cognitive ability, or social support system  Outcome: Progressing     Problem: Knowledge Deficit  Goal: Patient/family/caregiver demonstrates understanding of disease process, treatment plan, medications, and discharge instructions  Description: Complete learning assessment and assess knowledge base    Interventions:  - Provide teaching at level of understanding  - Provide teaching via preferred learning methods  Outcome: Progressing

## 2022-02-19 NOTE — PROGRESS NOTES
[address]  Progress Note - Polly Tafoya 1950, 80 y o  female MRN: [id number]  Unit/Bed#: ED 23 Encounter: [id number]  Primary Care Provider: ORACIO Castaneda   Date and time admitted to hospital: 2/18/2022  3:20 PM    * C  difficile colitis  Assessment & Plan  · Significant diarrhea in the setting of persistent C diff infection   · Patient completed 10 day course of oral vancomycin in January  · C diff positive again on 02/18/2022  · GI input appreciated   · Will continue supportive care   · Diet as tolerated  · Oral vancomycin 125 mg q 6 hours while inpatient with extended taper course as an outpatient   · Patient unable to be discharged today as she is not tolerating diet and has continue diarrhea    Chronic atrial fibrillation ([facility] )  Assessment & Plan  · Rate is currently controlled  · The patient is not on any AV node blocker  · Continue anticoagulation with warfarin    Stage 3a chronic kidney disease ([facility] )  Assessment & Plan  · Creatinine at baseline  Will continue to monitor    Ambulatory dysfunction  Assessment & Plan  · Patient is bed bound and has aids 12 hours a day at home    Chronic diastolic congestive heart failure (HCC)  Assessment & Plan  · No signs of acute exacerbation  · Will continue to monitor I&Os daily weight  · Diuretics held due to continued diarrhea    Morbid obesity with BMI of 60 0-69 9, adult [facility])  Assessment & Plan  · May benefit from outpatient bariatric surgery referral or outpatient weight loss program      VTE Prophylaxis:  Warfarin (Coumadin)    Patient Centered Rounds: I have performed bedside rounds with nursing staff today      Discussions with Specialists or Other Care Team Provider: yes  Education and Discussions with Family / Patient:  Updated patient regarding plan of care    Current Length of Stay: 0 day(s)    Current Patient Status: Inpatient   Certification Statement: The patient will continue to require additional inpatient hospital stay due to C diff infection    Discharge Plan:  Pending hospital course    Code Status: Level 1 - Full Code    Subjective:   No overnight events noted  Patient continues with diarrhea and decreased appetite  Intermittent abdominal pain  No fever or chills    Objective:     Vitals:   Temp (24hrs), Av 7 °F (36 5 °C), Min:97 7 °F (36 5 °C), Max:97 7 °F (36 5 °C)    Temp:  [97 7 °F (36 5 °C)] 97 7 °F (36 5 °C)  HR:  [60-74] 73  Resp:  [17-18] 18  BP: (102-140)/(49-69) 102/49  SpO2:  [92 %-99 %] 92 %  There is no height or weight on file to calculate BMI  Input and Output Summary (last 24 hours): Intake/Output Summary (Last 24 hours) at 2022 1058  Last data filed at 2022 1912  Gross per 24 hour   Intake 395 83 ml   Output --   Net 395 83 ml       Physical Exam:   Physical Exam  Constitutional:       General: She is not in acute distress  Comments: Morbidly obese   HENT:      Head: Normocephalic and atraumatic  Nose: Nose normal       Mouth/Throat:      Mouth: Mucous membranes are moist    Eyes:      Extraocular Movements: Extraocular movements intact  Conjunctiva/sclera: Conjunctivae normal    Cardiovascular:      Rate and Rhythm: Normal rate and regular rhythm  Pulmonary:      Effort: Pulmonary effort is normal  No respiratory distress  Abdominal:      Palpations: Abdomen is soft  Comments: Mild diffuse tenderness   Musculoskeletal:      Cervical back: Normal range of motion and neck supple  Comments: Generalized weakness   Skin:     General: Skin is warm and dry  Neurological:      General: No focal deficit present  Mental Status: She is alert  Mental status is at baseline  Cranial Nerves: No cranial nerve deficit  Comments: Patient with bilateral lower extremity weakness    Chronically bedbound   Psychiatric:         Mood and Affect: Mood normal          Behavior: Behavior normal          Additional Data:     Labs:    Results from last 7 days   Lab Units 02/19/22  0420 02/18/22  1602 02/18/22  1602   WBC Thousand/uL 9 56   < > 8 63   HEMOGLOBIN g/dL 10 3*   < > 11 1*   HEMATOCRIT % 35 4   < > 37 9   PLATELETS Thousands/uL 382   < > 386   NEUTROS PCT %  --   --  76*   LYMPHS PCT %  --   --  14   MONOS PCT %  --   --  6   EOS PCT %  --   --  3    < > = values in this interval not displayed  Results from last 7 days   Lab Units 02/19/22  0420 02/18/22  1602 02/18/22  1602   SODIUM mmol/L 137   < > 136   POTASSIUM mmol/L 4 4   < > 4 1   CHLORIDE mmol/L 99   < > 98   CO2 mmol/L 32   < > 33*   BUN mg/dL 19   < > 20   CREATININE mg/dL 0 86   < > 0 96   CALCIUM mg/dL 8 9   < > 8 8   ALK PHOS U/L  --   --  111*   ALT U/L  --   --  7   AST U/L  --   --  15    < > = values in this interval not displayed  Results from last 7 days   Lab Units 02/19/22  0420   INR  2 16*               * I Have Reviewed All Lab Data Listed Above  * Additional Pertinent Lab Tests Reviewed:  [facility] Admission  Reviewed    Imaging:  Imaging Reports Reviewed Today Include:  No new imaging    Recent Cultures (last 7 days):           Last 24 Hours Medication List:   Current Facility-Administered Medications   Medication Dose Route Frequency Provider Last Rate    acetaminophen  975 mg Oral Q8H PRN Qbaka, CRNP      allopurinol  100 mg Oral Daily Qbaka, CRNP      DULoxetine  20 mg Oral Daily Qbaka, CRNP      levothyroxine  125 mcg Oral Early Morning Qbaka, CRNP      multi-electrolyte  75 mL/hr Intravenous Continuous [employer], CRNP 75 mL/hr (02/18/22 1907)    triamcinolone   Topical BID Bri Bosch PA-C      vancomycin  125 mg Oral Q6H [address] [employer], CRNP      warfarin  2 mg Oral Every Other Day Qbaka, CRNP      warfarin  5 mg Oral Every Other Day Qbaka, CRNP          Today, Patient Was Seen By: Chase Snowden MD    ** Please Note: Dictation voice to text software may have been used in the creation of this document   **

## 2022-02-20 LAB
INR PPP: 2.14 (ref 0.84–1.19)
PROTHROMBIN TIME: 23.5 SECONDS (ref 11.6–14.5)

## 2022-02-20 PROCEDURE — 85610 PROTHROMBIN TIME: CPT | Performed by: INTERNAL MEDICINE

## 2022-02-20 PROCEDURE — 99232 SBSQ HOSP IP/OBS MODERATE 35: CPT | Performed by: INTERNAL MEDICINE

## 2022-02-20 RX ADMIN — ALLOPURINOL 100 MG: 100 TABLET ORAL at 08:25

## 2022-02-20 RX ADMIN — WARFARIN SODIUM 2 MG: 2 TABLET ORAL at 17:12

## 2022-02-20 RX ADMIN — TRIAMCINOLONE ACETONIDE: 1 CREAM TOPICAL at 08:20

## 2022-02-20 RX ADMIN — Medication 125 MG: at 23:46

## 2022-02-20 RX ADMIN — TRIAMCINOLONE ACETONIDE: 1 CREAM TOPICAL at 08:47

## 2022-02-20 RX ADMIN — Medication 125 MG: at 11:57

## 2022-02-20 RX ADMIN — TRIAMCINOLONE ACETONIDE: 1 CREAM TOPICAL at 17:26

## 2022-02-20 RX ADMIN — Medication 125 MG: at 05:45

## 2022-02-20 RX ADMIN — LEVOTHYROXINE SODIUM 125 MCG: 25 TABLET ORAL at 05:44

## 2022-02-20 RX ADMIN — Medication 125 MG: at 17:11

## 2022-02-20 RX ADMIN — ACETAMINOPHEN 975 MG: 325 TABLET ORAL at 23:46

## 2022-02-20 RX ADMIN — DULOXETINE HYDROCHLORIDE 20 MG: 20 CAPSULE, DELAYED RELEASE ORAL at 08:25

## 2022-02-20 NOTE — PROGRESS NOTES
[address]  Progress Note - [facility] 1950, 80 y o  female MRN: [id number]  Unit/Bed#: -01 Encounter: [id number]  Primary Care Provider: Corrinne Monte, CRNP   Date and time admitted to hospital: 2/18/2022  3:20 PM    * C  difficile colitis  Assessment & Plan  · Significant diarrhea in the setting of persistent C diff infection   · Patient completed 10 day course of oral vancomycin in January  · C diff positive again on 02/18/2022  · GI input appreciated   · Will continue supportive care   · Diet as tolerated  · Oral vancomycin 125 mg q 6 hours while inpatient with extended taper course as an outpatient   · Patient is now tolerating diet  Diarrhea still present but improving    Chronic atrial fibrillation (HCC)  Assessment & Plan  · Rate is currently controlled  · The patient is not on any AV node blockers  · Continue anticoagulation with warfarin    Stage 3a chronic kidney disease (HCC)  Assessment & Plan  · Creatinine at baseline  Will continue to monitor    Ambulatory dysfunction  Assessment & Plan  · Patient is bed bound and has aids 12 hours a day at home    Chronic diastolic congestive heart failure (HCC)  Assessment & Plan  · No signs of acute exacerbation  · Will continue to monitor I&Os daily weight  · Diuretics held due to continued diarrhea  · Patient was briefly on fluids, will discontinue    Morbid obesity with BMI of 60 0-69 9, adult [facility])  Assessment & Plan  · May benefit from outpatient bariatric surgery referral or outpatient weight loss program      VTE Prophylaxis:  Warfarin (Coumadin)    Patient Centered Rounds: I have performed bedside rounds with nursing staff today      Discussions with Specialists or Other Care Team Provider: yes  Education and Discussions with Family / Patient:  Updated patient regarding plan of care    Current Length of Stay: 1 day(s)    Current Patient Status: Inpatient   Certification Statement: The patient will continue to require additional inpatient hospital stay due to C diff infection    Discharge Plan:  Case management to reinstate home health care aides  Patient has 12 hours per day home care  Patient is typically bed-bound  Code Status: Level 1 - Full Code    Subjective:   No overnight events noted  Still with diarrhea and abdominal pain, gradually improving  Tolerating diet  Objective:     Vitals:   Temp (24hrs), Av 9 °F (36 6 °C), Min:97 5 °F (36 4 °C), Max:98 2 °F (36 8 °C)    Temp:  [97 5 °F (36 4 °C)-98 2 °F (36 8 °C)] 98 2 °F (36 8 °C)  HR:  [72-78] 72  Resp:  [15-20] 15  BP: (102-115)/(49-68) 107/67  SpO2:  [90 %-96 %] 90 %  Body mass index is 77 85 kg/m²  Input and Output Summary (last 24 hours): Intake/Output Summary (Last 24 hours) at 2022 0934  Last data filed at 2022 0500  Gross per 24 hour   Intake 375 ml   Output 1100 ml   Net -725 ml       Physical Exam:   Physical Exam  Constitutional:       General: She is not in acute distress  Comments: Morbidly obese  female   HENT:      Head: Normocephalic and atraumatic  Nose: Nose normal       Mouth/Throat:      Mouth: Mucous membranes are moist    Eyes:      Extraocular Movements: Extraocular movements intact  Conjunctiva/sclera: Conjunctivae normal    Cardiovascular:      Rate and Rhythm: Normal rate and regular rhythm  Pulmonary:      Effort: Pulmonary effort is normal  No respiratory distress  Abdominal:      Palpations: Abdomen is soft  Tenderness: There is abdominal tenderness  Musculoskeletal:      Cervical back: Normal range of motion and neck supple  Comments: Generalized weakness   Skin:     General: Skin is warm and dry  Neurological:      General: No focal deficit present  Mental Status: She is alert  Mental status is at baseline  Cranial Nerves: No cranial nerve deficit  Comments: Significant weakness of bilateral lower extremities    Patient is chronically bed-bound   Psychiatric:         Mood and Affect: Mood normal          Behavior: Behavior normal          Additional Data:     Labs:    Results from last 7 days   Lab Units 02/19/22  0420 02/18/22  1602 02/18/22  1602   WBC Thousand/uL 9 56   < > 8 63   HEMOGLOBIN g/dL 10 3*   < > 11 1*   HEMATOCRIT % 35 4   < > 37 9   PLATELETS Thousands/uL 382   < > 386   NEUTROS PCT %  --   --  76*   LYMPHS PCT %  --   --  14   MONOS PCT %  --   --  6   EOS PCT %  --   --  3    < > = values in this interval not displayed  Results from last 7 days   Lab Units 02/19/22  0420 02/18/22  1602 02/18/22  1602   SODIUM mmol/L 137   < > 136   POTASSIUM mmol/L 4 4   < > 4 1   CHLORIDE mmol/L 99   < > 98   CO2 mmol/L 32   < > 33*   BUN mg/dL 19   < > 20   CREATININE mg/dL 0 86   < > 0 96   CALCIUM mg/dL 8 9   < > 8 8   ALK PHOS U/L  --   --  111*   ALT U/L  --   --  7   AST U/L  --   --  15    < > = values in this interval not displayed  Results from last 7 days   Lab Units 02/20/22  0508   INR  2 14*               * I Have Reviewed All Lab Data Listed Above  * Additional Pertinent Lab Tests Reviewed:  [facility] Admission  Reviewed    Imaging:  Imaging Reports Reviewed Today Include:  No new imaging    Recent Cultures (last 7 days):           Last 24 Hours Medication List:   Current Facility-Administered Medications   Medication Dose Route Frequency Provider Last Rate    acetaminophen  975 mg Oral Q8H PRN ORACIO Griffiths      allopurinol  100 mg Oral Daily ORACIO Griffiths      DULoxetine  20 mg Oral Daily ORACIO Griffiths      levothyroxine  125 mcg Oral Early Morning ORACIO Griffiths      triamcinolone   Topical BID Darrell Griffith PA-C      vancomycin  125 mg Oral Q6H [address] ORACIO Griffiths      warfarin  2 mg Oral Every Other Day ORACIO Griffiths      warfarin  5 mg Oral Every Other Day ORACIO Griffiths          Today, Patient Was Seen By: Hari Craig, MD    ** Please Note: Dictation voice to text software may have been used in the creation of this document   **

## 2022-02-20 NOTE — ASSESSMENT & PLAN NOTE
· Rate is currently controlled  · The patient is not on any AV node blockers  · Continue anticoagulation with warfarin

## 2022-02-20 NOTE — ASSESSMENT & PLAN NOTE
· Significant diarrhea in the setting of persistent C diff infection   · Patient completed 10 day course of oral vancomycin in January  · C diff positive again on 02/18/2022  · GI input appreciated   · Will continue supportive care   · Diet as tolerated  · Oral vancomycin 125 mg q 6 hours while inpatient with extended taper course as an outpatient   · Patient is now tolerating diet    Diarrhea still present but improving

## 2022-02-20 NOTE — ASSESSMENT & PLAN NOTE
· No signs of acute exacerbation  · Will continue to monitor I&Os daily weight  · Diuretics held due to continued diarrhea  · Patient was briefly on fluids, will discontinue

## 2022-02-20 NOTE — PLAN OF CARE
Problem: Potential for Falls  Goal: Patient will remain free of falls  Description: INTERVENTIONS:  - Educate patient/family on patient safety including physical limitations  - Instruct patient to call for assistance with activity   - Consult OT/PT to assist with strengthening/mobility   - Keep Call bell within reach  - Keep bed low and locked with side rails adjusted as appropriate  - Keep care items and personal belongings within reach  - Initiate and maintain comfort rounds  - Make Fall Risk Sign visible to staff  - Offer Toileting every 2 Hours, in advance of need  - Initiate/Maintain bed alarm  - Obtain necessary fall risk management equipment  - Apply yellow socks and bracelet for high fall risk patients  - Consider moving patient to room near nurses station  Outcome: Progressing     Problem: MOBILITY - ADULT  Goal: Maintain or return to baseline ADL function  Description: INTERVENTIONS:  -  Assess patient's ability to carry out ADLs; assess patient's baseline for ADL function and identify physical deficits which impact ability to perform ADLs (bathing, care of mouth/teeth, toileting, grooming, dressing, etc )  - Assess/evaluate cause of self-care deficits   - Assess range of motion  - Assess patient's mobility; develop plan if impaired  - Assess patient's need for assistive devices and provide as appropriate  - Encourage maximum independence but intervene and supervise when necessary  - Involve family in performance of ADLs  - Assess for home care needs following discharge   - Consider OT consult to assist with ADL evaluation and planning for discharge  - Provide patient education as appropriate  Outcome: Progressing  Goal: Maintains/Returns to pre admission functional level  Description: INTERVENTIONS:  - Perform BMAT or MOVE assessment daily    - Set and communicate daily mobility goal to care team and patient/family/caregiver     - Collaborate with rehabilitation services on mobility goals if consulted  - Perform Range of Motion 3 times a day  - Reposition patient every 2 hours    - Dangle patient 2 times a day  - Stand patient 2 times a day  - Ambulate patient 2 times a day  - Out of bed to chair 2 times a day   - Out of bed for meals 2 times a day  - Out of bed for toileting  - Record patient progress and toleration of activity level   Outcome: Progressing     Problem: Prexisting or High Potential for Compromised Skin Integrity  Goal: Skin integrity is maintained or improved  Description: INTERVENTIONS:  - Identify patients at risk for skin breakdown  - Assess and monitor skin integrity  - Assess and monitor nutrition and hydration status  - Monitor labs   - Assess for incontinence   - Turn and reposition patient  - Assist with mobility/ambulation  - Relieve pressure over bony prominences  - Avoid friction and shearing  - Provide appropriate hygiene as needed including keeping skin clean and dry  - Evaluate need for skin moisturizer/barrier cream  - Collaborate with interdisciplinary team   - Patient/family teaching  - Consider wound care consult   Outcome: Progressing     Problem: GASTROINTESTINAL - ADULT  Goal: Minimal or absence of nausea and/or vomiting  Description: INTERVENTIONS:  - Administer IV fluids if ordered to ensure adequate hydration  - Maintain NPO status until nausea and vomiting are resolved  - Nasogastric tube if ordered  - Administer ordered antiemetic medications as needed  - Provide nonpharmacologic comfort measures as appropriate  - Advance diet as tolerated, if ordered  - Consider nutrition services referral to assist patient with adequate nutrition and appropriate food choices  Outcome: Progressing  Goal: Maintains or returns to baseline bowel function  Description: INTERVENTIONS:  - Assess bowel function  - Encourage oral fluids to ensure adequate hydration  - Administer IV fluids if ordered to ensure adequate hydration  - Administer ordered medications as needed  - Encourage mobilization and activity  - Consider nutritional services referral to assist patient with adequate nutrition and appropriate food choices  Outcome: Progressing  Goal: Maintains adequate nutritional intake  Description: INTERVENTIONS:  - Monitor percentage of each meal consumed  - Identify factors contributing to decreased intake, treat as appropriate  - Assist with meals as needed  - Monitor I&O, weight, and lab values if indicated  - Obtain nutrition services referral as needed  Outcome: Progressing  Goal: Establish and maintain optimal ostomy function  Description: INTERVENTIONS:  - Assess bowel function  - Encourage oral fluids to ensure adequate hydration  - Administer IV fluids if ordered to ensure adequate hydration   - Administer ordered medications as needed  - Encourage mobilization and activity  - Nutrition services referral to assist patient with appropriate food choices  - Assess stoma site  - Consider wound care consult   Outcome: Progressing  Goal: Oral mucous membranes remain intact  Description: INTERVENTIONS  - Assess oral mucosa and hygiene practices  - Implement preventative oral hygiene regimen  - Implement oral medicated treatments as ordered  - Initiate Nutrition services referral as needed  Outcome: Progressing     Problem: PAIN - ADULT  Goal: Verbalizes/displays adequate comfort level or baseline comfort level  Description: Interventions:  - Encourage patient to monitor pain and request assistance  - Assess pain using appropriate pain scale  - Administer analgesics based on type and severity of pain and evaluate response  - Implement non-pharmacological measures as appropriate and evaluate response  - Consider cultural and social influences on pain and pain management  - Notify physician/advanced practitioner if interventions unsuccessful or patient reports new pain  Outcome: Progressing     Problem: INFECTION - ADULT  Goal: Absence or prevention of progression during hospitalization  Description: INTERVENTIONS:  - Assess and monitor for signs and symptoms of infection  - Monitor lab/diagnostic results  - Monitor all insertion sites, i e  indwelling lines, tubes, and drains  - Monitor endotracheal if appropriate and nasal secretions for changes in amount and color  - Mitchell appropriate cooling/warming therapies per order  - Administer medications as ordered  - Instruct and encourage patient and family to use good hand hygiene technique  - Identify and instruct in appropriate isolation precautions for identified infection/condition  Outcome: Progressing  Goal: Absence of fever/infection during neutropenic period  Description: INTERVENTIONS:  - Monitor WBC    Outcome: Progressing     Goal: Maintain or return to baseline ADL function  Description: INTERVENTIONS:  -  Assess patient's ability to carry out ADLs; assess patient's baseline for ADL function and identify physical deficits which impact ability to perform ADLs (bathing, care of mouth/teeth, toileting, grooming, dressing, etc )  - Assess/evaluate cause of self-care deficits   - Assess range of motion  - Assess patient's mobility; develop plan if impaired  - Assess patient's need for assistive devices and provide as appropriate  - Encourage maximum independence but intervene and supervise when necessary  - Involve family in performance of ADLs  - Assess for home care needs following discharge   - Consider OT consult to assist with ADL evaluation and planning for discharge  - Provide patient education as appropriate  Outcome: Progressing     Problem: DISCHARGE PLANNING  Goal: Discharge to home or other facility with appropriate resources  Description: INTERVENTIONS:  - Identify barriers to discharge w/patient and caregiver  - Arrange for needed discharge resources and transportation as appropriate  - Identify discharge learning needs (meds, wound care, etc )  - Arrange for interpretive services to assist at discharge as needed  - Refer to Case Management Department for coordinating discharge planning if the patient needs post-hospital services based on physician/advanced practitioner order or complex needs related to functional status, cognitive ability, or social support system  Outcome: Progressing     Problem: Knowledge Deficit  Goal: Patient/family/caregiver demonstrates understanding of disease process, treatment plan, medications, and discharge instructions  Description: Complete learning assessment and assess knowledge base    Interventions:  - Provide teaching at level of understanding  - Provide teaching via preferred learning methods  Outcome: Progressing

## 2022-02-21 LAB
ANION GAP SERPL CALCULATED.3IONS-SCNC: 7 MMOL/L (ref 4–13)
BUN SERPL-MCNC: 23 MG/DL (ref 5–25)
CALCIUM SERPL-MCNC: 8.8 MG/DL (ref 8.4–10.2)
CHLORIDE SERPL-SCNC: 101 MMOL/L (ref 96–108)
CO2 SERPL-SCNC: 29 MMOL/L (ref 21–32)
CREAT SERPL-MCNC: 1.1 MG/DL (ref 0.6–1.3)
ERYTHROCYTE [DISTWIDTH] IN BLOOD BY AUTOMATED COUNT: 16.7 % (ref 11.6–15.1)
GFR SERPL CREATININE-BSD FRML MDRD: 50 ML/MIN/1.73SQ M
GLUCOSE SERPL-MCNC: 105 MG/DL (ref 65–140)
HCT VFR BLD AUTO: 34.5 % (ref 34.8–46.1)
HGB BLD-MCNC: 10.1 G/DL (ref 11.5–15.4)
MAGNESIUM SERPL-MCNC: 2.2 MG/DL (ref 1.9–2.7)
MCH RBC QN AUTO: 24.9 PG (ref 26.8–34.3)
MCHC RBC AUTO-ENTMCNC: 29.3 G/DL (ref 31.4–37.4)
MCV RBC AUTO: 85 FL (ref 82–98)
PLATELET # BLD AUTO: 371 THOUSANDS/UL (ref 149–390)
PMV BLD AUTO: 8.4 FL (ref 8.9–12.7)
POTASSIUM SERPL-SCNC: 4.1 MMOL/L (ref 3.5–5.3)
RBC # BLD AUTO: 4.05 MILLION/UL (ref 3.81–5.12)
SODIUM SERPL-SCNC: 137 MMOL/L (ref 135–147)
WBC # BLD AUTO: 7.46 THOUSAND/UL (ref 4.31–10.16)

## 2022-02-21 PROCEDURE — 99233 SBSQ HOSP IP/OBS HIGH 50: CPT | Performed by: NURSE PRACTITIONER

## 2022-02-21 PROCEDURE — 83735 ASSAY OF MAGNESIUM: CPT | Performed by: INTERNAL MEDICINE

## 2022-02-21 PROCEDURE — 80048 BASIC METABOLIC PNL TOTAL CA: CPT | Performed by: INTERNAL MEDICINE

## 2022-02-21 PROCEDURE — 85027 COMPLETE CBC AUTOMATED: CPT | Performed by: INTERNAL MEDICINE

## 2022-02-21 RX ADMIN — DULOXETINE HYDROCHLORIDE 20 MG: 20 CAPSULE, DELAYED RELEASE ORAL at 08:23

## 2022-02-21 RX ADMIN — WARFARIN SODIUM 5 MG: 5 TABLET ORAL at 17:56

## 2022-02-21 RX ADMIN — LEVOTHYROXINE SODIUM 125 MCG: 25 TABLET ORAL at 05:40

## 2022-02-21 RX ADMIN — TRIAMCINOLONE ACETONIDE: 1 CREAM TOPICAL at 08:24

## 2022-02-21 RX ADMIN — Medication 125 MG: at 05:40

## 2022-02-21 RX ADMIN — ALLOPURINOL 100 MG: 100 TABLET ORAL at 08:23

## 2022-02-21 RX ADMIN — Medication 125 MG: at 17:31

## 2022-02-21 RX ADMIN — Medication 125 MG: at 12:07

## 2022-02-21 NOTE — PLAN OF CARE
Problem: Potential for Falls  Goal: Patient will remain free of falls  Description: INTERVENTIONS:  - Educate patient/family on patient safety including physical limitations  - Instruct patient to call for assistance with activity   - Consult OT/PT to assist with strengthening/mobility   - Keep Call bell within reach  - Keep bed low and locked with side rails adjusted as appropriate  - Keep care items and personal belongings within reach  - Initiate and maintain comfort rounds  - Make Fall Risk Sign visible to staff  - Offer Toileting every 2 Hours, in advance of need  - Apply yellow socks and bracelet for high fall risk patients  - Consider moving patient to room near nurses station  Outcome: Progressing     Problem: Prexisting or High Potential for Compromised Skin Integrity  Goal: Skin integrity is maintained or improved  Description: INTERVENTIONS:  - Identify patients at risk for skin breakdown  - Assess and monitor skin integrity  - Assess and monitor nutrition and hydration status  - Monitor labs   - Assess for incontinence   - Turn and reposition patient  - Assist with mobility/ambulation  - Relieve pressure over bony prominences  - Avoid friction and shearing  - Provide appropriate hygiene as needed including keeping skin clean and dry  - Evaluate need for skin moisturizer/barrier cream  - Collaborate with interdisciplinary team   - Patient/family teaching  - Consider wound care consult   Outcome: Progressing     Problem: GASTROINTESTINAL - ADULT  Goal: Minimal or absence of nausea and/or vomiting  Description: INTERVENTIONS:  - Administer IV fluids if ordered to ensure adequate hydration  - Maintain NPO status until nausea and vomiting are resolved  - Nasogastric tube if ordered  - Administer ordered antiemetic medications as needed  - Provide nonpharmacologic comfort measures as appropriate  - Advance diet as tolerated, if ordered  - Consider nutrition services referral to assist patient with adequate nutrition and appropriate food choices  Outcome: Progressing  Goal: Maintains or returns to baseline bowel function  Description: INTERVENTIONS:  - Assess bowel function  - Encourage oral fluids to ensure adequate hydration  - Administer IV fluids if ordered to ensure adequate hydration  - Administer ordered medications as needed  - Encourage mobilization and activity  - Consider nutritional services referral to assist patient with adequate nutrition and appropriate food choices  Outcome: Progressing  Goal: Maintains adequate nutritional intake  Description: INTERVENTIONS:  - Monitor percentage of each meal consumed  - Identify factors contributing to decreased intake, treat as appropriate  - Assist with meals as needed  - Monitor I&O, weight, and lab values if indicated  - Obtain nutrition services referral as needed  Outcome: Progressing     Problem: PAIN - ADULT  Goal: Verbalizes/displays adequate comfort level or baseline comfort level  Description: Interventions:  - Encourage patient to monitor pain and request assistance  - Assess pain using appropriate pain scale  - Administer analgesics based on type and severity of pain and evaluate response  - Implement non-pharmacological measures as appropriate and evaluate response  - Consider cultural and social influences on pain and pain management  - Notify physician/advanced practitioner if interventions unsuccessful or patient reports new pain  Outcome: Progressing     Problem: INFECTION - ADULT  Goal: Absence or prevention of progression during hospitalization  Description: INTERVENTIONS:  - Assess and monitor for signs and symptoms of infection  - Monitor lab/diagnostic results  - Monitor all insertion sites, i e  indwelling lines, tubes, and drains  - Monitor endotracheal if appropriate and nasal secretions for changes in amount and color  - [place] appropriate cooling/warming therapies per order  - Administer medications as ordered  - Instruct and encourage patient and family to use good hand hygiene technique  - Identify and instruct in appropriate isolation precautions for identified infection/condition  Outcome: Progressing     Problem: SAFETY ADULT  Goal: Patient will remain free of falls  Description: INTERVENTIONS:  - Educate patient/family on patient safety including physical limitations  - Instruct patient to call for assistance with activity   - Consult OT/PT to assist with strengthening/mobility   - Keep Call bell within reach  - Keep bed low and locked with side rails adjusted as appropriate  - Keep care items and personal belongings within reach  - Initiate and maintain comfort rounds  - Make Fall Risk Sign visible to staff  - Offer Toileting every 2 Hours, in advance of need  - Apply yellow socks and bracelet for high fall risk patients  - Consider moving patient to room near nurses station  Outcome: Progressing     Problem: Potential for Falls  Goal: Patient will remain free of falls  Description: INTERVENTIONS:  - Educate patient/family on patient safety including physical limitations  - Instruct patient to call for assistance with activity   - Consult OT/PT to assist with strengthening/mobility   - Keep Call bell within reach  - Keep bed low and locked with side rails adjusted as appropriate  - Keep care items and personal belongings within reach  - Initiate and maintain comfort rounds  - Make Fall Risk Sign visible to staff  - Offer Toileting every 2 Hours, in advance of need  - Apply yellow socks and bracelet for high fall risk patients  - Consider moving patient to room near nurses station  Outcome: Progressing     Problem: Prexisting or High Potential for Compromised Skin Integrity  Goal: Skin integrity is maintained or improved  Description: INTERVENTIONS:  - Identify patients at risk for skin breakdown  - Assess and monitor skin integrity  - Assess and monitor nutrition and hydration status  - Monitor labs   - Assess for incontinence   - Turn and reposition patient  - Assist with mobility/ambulation  - Relieve pressure over bony prominences  - Avoid friction and shearing  - Provide appropriate hygiene as needed including keeping skin clean and dry  - Evaluate need for skin moisturizer/barrier cream  - Collaborate with interdisciplinary team   - Patient/family teaching  - Consider wound care consult   Outcome: Progressing     Problem: GASTROINTESTINAL - ADULT  Goal: Minimal or absence of nausea and/or vomiting  Description: INTERVENTIONS:  - Administer IV fluids if ordered to ensure adequate hydration  - Maintain NPO status until nausea and vomiting are resolved  - Nasogastric tube if ordered  - Administer ordered antiemetic medications as needed  - Provide nonpharmacologic comfort measures as appropriate  - Advance diet as tolerated, if ordered  - Consider nutrition services referral to assist patient with adequate nutrition and appropriate food choices  Outcome: Progressing  Goal: Maintains or returns to baseline bowel function  Description: INTERVENTIONS:  - Assess bowel function  - Encourage oral fluids to ensure adequate hydration  - Administer IV fluids if ordered to ensure adequate hydration  - Administer ordered medications as needed  - Encourage mobilization and activity  - Consider nutritional services referral to assist patient with adequate nutrition and appropriate food choices  Outcome: Progressing  Goal: Maintains adequate nutritional intake  Description: INTERVENTIONS:  - Monitor percentage of each meal consumed  - Identify factors contributing to decreased intake, treat as appropriate  - Assist with meals as needed  - Monitor I&O, weight, and lab values if indicated  - Obtain nutrition services referral as needed  Outcome: Progressing     Problem: PAIN - ADULT  Goal: Verbalizes/displays adequate comfort level or baseline comfort level  Description: Interventions:  - Encourage patient to monitor pain and request assistance  - Assess pain using appropriate pain scale  - Administer analgesics based on type and severity of pain and evaluate response  - Implement non-pharmacological measures as appropriate and evaluate response  - Consider cultural and social influences on pain and pain management  - Notify physician/advanced practitioner if interventions unsuccessful or patient reports new pain  Outcome: Progressing     Problem: INFECTION - ADULT  Goal: Absence or prevention of progression during hospitalization  Description: INTERVENTIONS:  - Assess and monitor for signs and symptoms of infection  - Monitor lab/diagnostic results  - Monitor all insertion sites, i e  indwelling lines, tubes, and drains  - Monitor endotracheal if appropriate and nasal secretions for changes in amount and color  - [place] appropriate cooling/warming therapies per order  - Administer medications as ordered  - Instruct and encourage patient and family to use good hand hygiene technique  - Identify and instruct in appropriate isolation precautions for identified infection/condition  Outcome: Progressing     Problem: SAFETY ADULT  Goal: Patient will remain free of falls  Description: INTERVENTIONS:  - Educate patient/family on patient safety including physical limitations  - Instruct patient to call for assistance with activity   - Consult OT/PT to assist with strengthening/mobility   - Keep Call bell within reach  - Keep bed low and locked with side rails adjusted as appropriate  - Keep care items and personal belongings within reach  - Initiate and maintain comfort rounds  - Make Fall Risk Sign visible to staff  - Offer Toileting every 2 Hours, in advance of need  - Apply yellow socks and bracelet for high fall risk patients  - Consider moving patient to room near nurses station  Outcome: Progressing

## 2022-02-21 NOTE — ASSESSMENT & PLAN NOTE
· No signs of acute exacerbation  · Will continue to monitor I&Os, daily weight  · Diuretics held due to continued diarrhea

## 2022-02-21 NOTE — ASSESSMENT & PLAN NOTE
· Patient presented to the ED with complaints of severe, persistent diarrhea  · History of c-diff infection in January, completed a 10 day course of oral Vancomycin    · C diff positive again on 02/18/2022  · GI input appreciated  · Will continue supportive care   · Advance diet as tolerated  · Oral vancomycin 125 mg q 6 hours while inpatient with extended taper course as an outpatient

## 2022-02-21 NOTE — PLAN OF CARE
Problem: Potential for Falls  Goal: Patient will remain free of falls  Description: INTERVENTIONS:  - Educate patient/family on patient safety including physical limitations  - Instruct patient to call for assistance with activity   - Consult OT/PT to assist with strengthening/mobility   - Keep Call bell within reach  - Keep bed low and locked with side rails adjusted as appropriate  - Keep care items and personal belongings within reach  - Initiate and maintain comfort rounds  - Make Fall Risk Sign visible to staff  - Offer Toileting every 2 Hours, in advance of need  - Initiate/Maintain bed alarm  - Obtain necessary fall risk management equipment: yellow socks  - Apply yellow socks and bracelet for high fall risk patients  - Consider moving patient to room near nurses station  Outcome: Progressing     Problem: MOBILITY - ADULT  Goal: Maintain or return to baseline ADL function  Description: INTERVENTIONS:  -  Assess patient's ability to carry out ADLs; assess patient's baseline for ADL function and identify physical deficits which impact ability to perform ADLs (bathing, care of mouth/teeth, toileting, grooming, dressing, etc )  - Assess/evaluate cause of self-care deficits   - Assess range of motion  - Assess patient's mobility; develop plan if impaired  - Assess patient's need for assistive devices and provide as appropriate  - Encourage maximum independence but intervene and supervise when necessary  - Involve family in performance of ADLs  - Assess for home care needs following discharge   - Consider OT consult to assist with ADL evaluation and planning for discharge  - Provide patient education as appropriate  Outcome: Progressing  Goal: Maintains/Returns to pre admission functional level  Description: INTERVENTIONS:  - Perform BMAT or MOVE assessment daily    - Set and communicate daily mobility goal to care team and patient/family/caregiver     - Collaborate with rehabilitation services on mobility goals if consulted  - Perform Range of Motion 3 times a day  - Reposition patient every 2 hours    - Dangle patient 2 times a day  - Stand patient 2 times a day  - Ambulate patient 2 times a day  - Out of bed to chair 2 times a day   - Out of bed for meals 2 times a day  - Out of bed for toileting  - Record patient progress and toleration of activity level   Outcome: Progressing     Problem: Prexisting or High Potential for Compromised Skin Integrity  Goal: Skin integrity is maintained or improved  Description: INTERVENTIONS:  - Identify patients at risk for skin breakdown  - Assess and monitor skin integrity  - Assess and monitor nutrition and hydration status  - Monitor labs   - Assess for incontinence   - Turn and reposition patient  - Assist with mobility/ambulation  - Relieve pressure over bony prominences  - Avoid friction and shearing  - Provide appropriate hygiene as needed including keeping skin clean and dry  - Evaluate need for skin moisturizer/barrier cream  - Collaborate with interdisciplinary team   - Patient/family teaching  - Consider wound care consult   Outcome: Progressing     Problem: GASTROINTESTINAL - ADULT  Goal: Minimal or absence of nausea and/or vomiting  Description: INTERVENTIONS:  - Administer IV fluids if ordered to ensure adequate hydration  - Maintain NPO status until nausea and vomiting are resolved  - Nasogastric tube if ordered  - Administer ordered antiemetic medications as needed  - Provide nonpharmacologic comfort measures as appropriate  - Advance diet as tolerated, if ordered  - Consider nutrition services referral to assist patient with adequate nutrition and appropriate food choices  Outcome: Progressing  Goal: Maintains or returns to baseline bowel function  Description: INTERVENTIONS:  - Assess bowel function  - Encourage oral fluids to ensure adequate hydration  - Administer IV fluids if ordered to ensure adequate hydration  - Administer ordered medications as needed  - Encourage mobilization and activity  - Consider nutritional services referral to assist patient with adequate nutrition and appropriate food choices  Outcome: Progressing  Goal: Maintains adequate nutritional intake  Description: INTERVENTIONS:  - Monitor percentage of each meal consumed  - Identify factors contributing to decreased intake, treat as appropriate  - Assist with meals as needed  - Monitor I&O, weight, and lab values if indicated  - Obtain nutrition services referral as needed  Outcome: Progressing  Goal: Establish and maintain optimal ostomy function  Description: INTERVENTIONS:  - Assess bowel function  - Encourage oral fluids to ensure adequate hydration  - Administer IV fluids if ordered to ensure adequate hydration   - Administer ordered medications as needed  - Encourage mobilization and activity  - Nutrition services referral to assist patient with appropriate food choices  - Assess stoma site  - Consider wound care consult   Outcome: Progressing  Goal: Oral mucous membranes remain intact  Description: INTERVENTIONS  - Assess oral mucosa and hygiene practices  - Implement preventative oral hygiene regimen  - Implement oral medicated treatments as ordered  - Initiate Nutrition services referral as needed  Outcome: Progressing     Problem: PAIN - ADULT  Goal: Verbalizes/displays adequate comfort level or baseline comfort level  Description: Interventions:  - Encourage patient to monitor pain and request assistance  - Assess pain using appropriate pain scale  - Administer analgesics based on type and severity of pain and evaluate response  - Implement non-pharmacological measures as appropriate and evaluate response  - Consider cultural and social influences on pain and pain management  - Notify physician/advanced practitioner if interventions unsuccessful or patient reports new pain  Outcome: Progressing     Problem: INFECTION - ADULT  Goal: Absence or prevention of progression during hospitalization  Description: INTERVENTIONS:  - Assess and monitor for signs and symptoms of infection  - Monitor lab/diagnostic results  - Monitor all insertion sites, i e  indwelling lines, tubes, and drains  - Monitor endotracheal if appropriate and nasal secretions for changes in amount and color  - [place] appropriate cooling/warming therapies per order  - Administer medications as ordered  - Instruct and encourage patient and family to use good hand hygiene technique  - Identify and instruct in appropriate isolation precautions for identified infection/condition  Outcome: Progressing  Goal: Absence of fever/infection during neutropenic period  Description: INTERVENTIONS:  - Monitor WBC    Outcome: Progressing     Goal: Maintain or return to baseline ADL function  Description: INTERVENTIONS:  -  Assess patient's ability to carry out ADLs; assess patient's baseline for ADL function and identify physical deficits which impact ability to perform ADLs (bathing, care of mouth/teeth, toileting, grooming, dressing, etc )  - Assess/evaluate cause of self-care deficits   - Assess range of motion  - Assess patient's mobility; develop plan if impaired  - Assess patient's need for assistive devices and provide as appropriate  - Encourage maximum independence but intervene and supervise when necessary  - Involve family in performance of ADLs  - Assess for home care needs following discharge   - Consider OT consult to assist with ADL evaluation and planning for discharge  - Provide patient education as appropriate  Outcome: Progressing     Problem: DISCHARGE PLANNING  Goal: Discharge to home or other facility with appropriate resources  Description: INTERVENTIONS:  - Identify barriers to discharge w/patient and caregiver  - Arrange for needed discharge resources and transportation as appropriate  - Identify discharge learning needs (meds, wound care, etc )  - Arrange for interpretive services to assist at discharge as needed  - Refer to Case Management Department for coordinating discharge planning if the patient needs post-hospital services based on physician/advanced practitioner order or complex needs related to functional status, cognitive ability, or social support system  Outcome: Progressing     Problem: Knowledge Deficit  Goal: Patient/family/caregiver demonstrates understanding of disease process, treatment plan, medications, and discharge instructions  Description: Complete learning assessment and assess knowledge base    Interventions:  - Provide teaching at level of understanding  - Provide teaching via preferred learning methods  Outcome: Progressing

## 2022-02-21 NOTE — ASSESSMENT & PLAN NOTE
· Chronic  · Baseline creatinine noted to be 0 9-1 1  · Creatinine at baseline      · Will continue to monitor

## 2022-02-21 NOTE — PROGRESS NOTES
[address]  Progress Note - Scarlett Berry 1950, 80 y o  female MRN: [id number]  Unit/Bed#: -01 Encounter: [id number]  Primary Care Provider: ORACIO Lynn   Date and time admitted to hospital: 2/18/2022  3:20 PM    * C  difficile colitis  Assessment & Plan  · Patient presented to the ED with complaints of severe, persistent diarrhea  · History of c-diff infection in January, completed a 10 day course of oral Vancomycin  · C diff positive again on 02/18/2022  · GI input appreciated  · Will continue supportive care   · Advance diet as tolerated  · Oral vancomycin 125 mg q 6 hours while inpatient with extended taper course as an outpatient     Chronic atrial fibrillation (HCC)  Assessment & Plan  · Rate is currently controlled  · The patient is not on any AV node blockers  · Continue anticoagulation with warfarin    Stage 3a chronic kidney disease (HCC)  Assessment & Plan  · Chronic  · Baseline creatinine noted to be 0 9-1 1  · Creatinine at baseline  · Will continue to monitor    Ambulatory dysfunction  Assessment & Plan  · Patient is bed bound and has aids 12 hours a day at home    Chronic diastolic congestive heart failure (HCC)  Assessment & Plan  · No signs of acute exacerbation  · Will continue to monitor I&Os, daily weight  · Diuretics held due to continued diarrhea    Morbid obesity with BMI of 60 0-69 9, adult [facility])  Assessment & Plan  · May benefit from outpatient bariatric surgery referral or outpatient weight loss program      VTE Pharmacologic Prophylaxis: VTE Score: 7 High Risk (Score >/= 5) - Pharmacological DVT Prophylaxis Ordered: warfarin (Coumadin)  Sequential Compression Devices Ordered  Patient Centered Rounds: I performed bedside rounds with nursing staff today    Discussions with Specialists or Other Care Team Provider: Case Management, GI Note reviewed    Education and Discussions with Family / Patient: Patient declined call to   Time Spent for Care: 20 minutes  More than 50% of total time spent on counseling and coordination of care as described above  Current Length of Stay: 2 day(s)  Current Patient Status: Inpatient   Certification Statement: The patient will continue to require additional inpatient hospital stay due to ongoing treatment in setting of diarrhea; symptom management  Discharge Plan: Anticipate discharge tomorrow to home with home services  Code Status: Level 1 - Full Code    Subjective:   Patient overall reports feeling better denies any nausea currently patient reporting abdominal pain he is improving each daily in her bowel movements her improved in frequency consider lately  Patient has no other questions at this time agrees with the above-mentioned plan  Objective:     Vitals:   Temp (24hrs), Av 8 °F (36 6 °C), Min:97 6 °F (36 4 °C), Max:98 °F (36 7 °C)    Temp:  [97 6 °F (36 4 °C)-98 °F (36 7 °C)] 97 6 °F (36 4 °C)  HR:  [70-75] 70  Resp:  [18-20] 18  BP: ()/(60-75) 90/60  SpO2:  [93 %-96 %] 93 %  Body mass index is 78 18 kg/m²  Input and Output Summary (last 24 hours):   No intake or output data in the 24 hours ending 22 1211    Physical Exam:   Physical Exam  Vitals and nursing note reviewed  Constitutional:       General: She is not in acute distress  Appearance: She is well-developed  She is obese  HENT:      Head: Normocephalic and atraumatic  Eyes:      Conjunctiva/sclera: Conjunctivae normal    Cardiovascular:      Rate and Rhythm: Normal rate and regular rhythm  Heart sounds: No murmur heard  Pulmonary:      Effort: Pulmonary effort is normal  No respiratory distress  Breath sounds: Normal breath sounds  Abdominal:      Palpations: Abdomen is soft  Tenderness: There is abdominal tenderness in the left lower quadrant  Musculoskeletal:      Cervical back: Neck supple  Skin:     General: Skin is warm and dry     Neurological: Mental Status: She is alert and oriented to person, place, and time  Mental status is at baseline  Psychiatric:         Mood and Affect: Mood normal          Behavior: Behavior normal            Additional Data:     Labs:  Results from last 7 days   Lab Units 02/21/22 0427 02/19/22 0420 02/18/22  1602   WBC Thousand/uL 7 46   < > 8 63   HEMOGLOBIN g/dL 10 1*   < > 11 1*   HEMATOCRIT % 34 5*   < > 37 9   PLATELETS Thousands/uL 371   < > 386   NEUTROS PCT %  --   --  76*   LYMPHS PCT %  --   --  14   MONOS PCT %  --   --  6   EOS PCT %  --   --  3    < > = values in this interval not displayed  Results from last 7 days   Lab Units 02/21/22 0427 02/19/22 0420 02/18/22  1602   SODIUM mmol/L 137   < > 136   POTASSIUM mmol/L 4 1   < > 4 1   CHLORIDE mmol/L 101   < > 98   CO2 mmol/L 29   < > 33*   BUN mg/dL 23   < > 20   CREATININE mg/dL 1 10   < > 0 96   ANION GAP mmol/L 7   < > 5   CALCIUM mg/dL 8 8   < > 8 8   ALBUMIN g/dL  --   --  3 1*   TOTAL BILIRUBIN mg/dL  --   --  0 99   ALK PHOS U/L  --   --  111*   ALT U/L  --   --  7   AST U/L  --   --  15   GLUCOSE RANDOM mg/dL 105   < > 104    < > = values in this interval not displayed  Results from last 7 days   Lab Units 02/20/22  0508   INR  2 14*             Results from last 7 days   Lab Units 02/19/22  0420   PROCALCITONIN ng/ml 0 05       Lines/Drains:  Invasive Devices  Report    Peripheral Intravenous Line            Peripheral IV 01/07/22 Right Antecubital 44 days    Peripheral IV 02/18/22 Right Antecubital 2 days                      Imaging: No pertinent imaging reviewed      Recent Cultures (last 7 days):         Last 24 Hours Medication List:   Current Facility-Administered Medications   Medication Dose Route Frequency Provider Last Rate    acetaminophen  975 mg Oral Q8H PRN Ludwig Copenhagen, CRNP      allopurinol  100 mg Oral Daily Ludwig Copenhagen, CRNP      DULoxetine  20 mg Oral Daily Ludwig Dave, CRNP      levothyroxine  125 mcg Oral Early Morning ORACIO Armendariz      triamcinolone   Topical BID Mary Bradley PA-C      vancomycin  125 mg Oral [facility] ORACIO Armendariz      warfarin  2 mg Oral Every Other Day ORACIO Armendariz      warfarin  5 mg Oral Every Other Day ORACIO Armendariz          Today, Patient Was Seen By: ORACIO Del Toro    **Please Note: This note may have been constructed using a voice recognition system  **

## 2022-02-22 VITALS
HEIGHT: 65 IN | BODY MASS INDEX: 48.82 KG/M2 | WEIGHT: 293 LBS | DIASTOLIC BLOOD PRESSURE: 57 MMHG | HEART RATE: 69 BPM | SYSTOLIC BLOOD PRESSURE: 112 MMHG | TEMPERATURE: 98.2 F | RESPIRATION RATE: 18 BRPM | OXYGEN SATURATION: 96 %

## 2022-02-22 PROCEDURE — 99232 SBSQ HOSP IP/OBS MODERATE 35: CPT | Performed by: FAMILY MEDICINE

## 2022-02-22 PROCEDURE — 99239 HOSP IP/OBS DSCHRG MGMT >30: CPT | Performed by: NURSE PRACTITIONER

## 2022-02-22 RX ORDER — WARFARIN SODIUM 5 MG/1
5 TABLET ORAL
Status: ON HOLD
Start: 2022-02-22 | End: 2022-07-26 | Stop reason: DRUGHIGH

## 2022-02-22 RX ORDER — WARFARIN SODIUM 2 MG/1
2 TABLET ORAL
Start: 2022-02-22

## 2022-02-22 RX ADMIN — DULOXETINE HYDROCHLORIDE 20 MG: 20 CAPSULE, DELAYED RELEASE ORAL at 08:18

## 2022-02-22 RX ADMIN — Medication 125 MG: at 00:20

## 2022-02-22 RX ADMIN — TRIAMCINOLONE ACETONIDE: 1 CREAM TOPICAL at 08:19

## 2022-02-22 RX ADMIN — ALLOPURINOL 100 MG: 100 TABLET ORAL at 08:19

## 2022-02-22 RX ADMIN — LEVOTHYROXINE SODIUM 125 MCG: 25 TABLET ORAL at 05:54

## 2022-02-22 RX ADMIN — Medication 125 MG: at 11:50

## 2022-02-22 RX ADMIN — Medication 125 MG: at 05:54

## 2022-02-22 NOTE — DISCHARGE SUMMARY
[address]  Discharge- Ambika Plata 1950, 80 y o  female MRN: [id number]  Unit/Bed#: -01 Encounter: [id number]  Primary Care Provider: ORACIO Gutierres   Date and time admitted to hospital: 2/18/2022  3:20 PM    * C  difficile colitis  Assessment & Plan  · Patient presented to the ED with complaints of severe, persistent diarrhea  · History of c-diff infection in January, completed a 10 day course of oral Vancomycin  · C diff positive again on 02/18/2022  · GI input appreciated  · Will continue supportive care   · Advance diet as tolerated  · Oral vancomycin 125 mg q 6 hours while inpatient with extended taper course as an outpatient   · Overall medically stable for discharge    Chronic atrial fibrillation ([address] )  Assessment & Plan  · Rate is currently controlled  · The patient is not on any AV node blockers  · Continue anticoagulation with warfarin    Stage 3a chronic kidney disease (HCC)  Assessment & Plan  · Chronic  · Baseline creatinine noted to be 0 9-1 1  · Creatinine at baseline      · Will continue to monitor    Ambulatory dysfunction  Assessment & Plan  · Patient is bed bound and has aids 12 hours a day at home  · Home care is available for today patient can discharge home    Chronic diastolic congestive heart failure (HCC)  Assessment & Plan  · No signs of acute exacerbation  · Will continue to monitor I&Os, daily weight  · Diuretics held due to continued diarrhea can be resumed upon discharge improvement of bowel movement    Morbid obesity with BMI of 60 0-69 9, adult [facility])  Assessment & Plan  · May benefit from outpatient bariatric surgery referral or outpatient weight loss program      Medical Problems             Resolved Problems  Date Reviewed: 2/22/2022    None              Discharging Physician / Practitioner: ORACIO Hanson  PCP: ORACIO Gutierres  Admission Date:   Admission Orders (From admission, onward)     Ordered 02/19/22 1058  Inpatient Admission  Once            02/18/22 1717  Place in Observation  Once                      Discharge Date: 02/22/22    Consultations During Hospital Stay:  · Gastroenterology    Procedures Performed:   · Non    Significant Findings / Test Results:   · C diff colitis--recurrent episode discharging on extended taper of oral vancomycin  · Ambulatory dysfunction patient is bed-bound at home baseline with caregivers  · Known history of chronic atrial fibrillation  · Known history of chronic diastolic heart failure  · Morbid obesity BMI 78 62  · States 3 chronic kidney disease    Incidental Findings:   · None     Test Results Pending at Discharge (will require follow up): · None     Outpatient Tests Requested:  · Per Gastroenterology    Complications:  None    Reason for Admission:  C diff colitis    Hospital Course:   Torsten Carrion is a 80 y o  female patient who originally presented to the hospital on 2/18/2022 due to known history of morbid obesity, atrial fibrillation, congestive heart failure, CKD in patient who is bed-bound at baseline presenting with recurrent C diff colitis patient had a recent infection was treated outpatient with oral vancomycin patient was having substantial diarrhea at home came back to the ED and was admitted to continue oral vancomycin and monitoring of I/O given significant volume losses  Patient overall showed a gradual improvement over a 2 day  Was medically cleared on day 2 however patient needed to ensure that her caregivers were in place so patient was held an additional day and was discharged home to continue a long vancomycin taper outpatient follow-up with Gastroenterology  Please see above list of diagnoses and related plan for additional information       Condition at Discharge: stable    Discharge Day Visit / Exam:   Subjective:  Patient reporting ongoing improvement of her abdominal pain has no other concerns at this time and overall has shown improvement of her bowel movements  Vitals: Blood Pressure: 112/57 (02/22/22 0717)  Pulse: 69 (02/22/22 0717)  Temperature: 98 2 °F (36 8 °C) (02/22/22 0717)  Temp Source: Oral (02/21/22 2229)  Respirations: 18 (02/21/22 2229)  Height: 5' 5" (165 1 cm) (02/19/22 1522)  Weight - Scale: (!) 214 kg (472 lb 7 1 oz) (02/22/22 0600)  SpO2: 96 % (02/22/22 0717)  Exam:   Physical Exam  Vitals and nursing note reviewed  Constitutional:       General: She is not in acute distress  Appearance: She is well-developed  She is obese  HENT:      Head: Normocephalic and atraumatic  Eyes:      Conjunctiva/sclera: Conjunctivae normal    Cardiovascular:      Rate and Rhythm: Normal rate and regular rhythm  Heart sounds: No murmur heard  Pulmonary:      Effort: Pulmonary effort is normal  No respiratory distress  Breath sounds: Normal breath sounds  Abdominal:      Palpations: Abdomen is soft  Tenderness: There is no abdominal tenderness  Musculoskeletal:      Cervical back: Neck supple  Skin:     General: Skin is warm and dry  Neurological:      Mental Status: She is alert and oriented to person, place, and time  Mental status is at baseline  Psychiatric:         Mood and Affect: Mood normal          Behavior: Behavior normal          Discussion with Family: Patient declined call to   Discharge instructions/Information to patient and family:   See after visit summary for information provided to patient and family  Provisions for Follow-Up Care:  See after visit summary for information related to follow-up care and any pertinent home health orders  Disposition:   Home with VNA Services (Reminder: Complete face to face encounter)    Planned Readmission:  None     Discharge Statement:  I spent 35 minutes discharging the patient  This time was spent on the day of discharge  I had direct contact with the patient on the day of discharge   Greater than 50% of the total time was spent examining patient, answering all patient questions, arranging and discussing plan of care with patient as well as directly providing post-discharge instructions  Additional time then spent on discharge activities  Discharge Medications:  See after visit summary for reconciled discharge medications provided to patient and/or family        **Please Note: This note may have been constructed using a voice recognition system**

## 2022-02-22 NOTE — PLAN OF CARE
Problem: Potential for Falls  Goal: Patient will remain free of falls  Description: INTERVENTIONS:  - Educate patient/family on patient safety including physical limitations  - Instruct patient to call for assistance with activity   - Consult OT/PT to assist with strengthening/mobility   - Keep Call bell within reach  - Keep bed low and locked with side rails adjusted as appropriate  - Keep care items and personal belongings within reach  - Initiate and maintain comfort rounds  - Make Fall Risk Sign visible to staff  - Offer Toileting every 2 Hours, in advance of need  - Apply yellow socks and bracelet for high fall risk patients  - Consider moving patient to room near nurses station  Outcome: Progressing     Problem: MOBILITY - ADULT  Goal: Maintain or return to baseline ADL function  Description: INTERVENTIONS:  - Educate patient/family on patient safety including physical limitations  - Instruct patient to call for assistance with activity   - Consult OT/PT to assist with strengthening/mobility   - Keep Call bell within reach  - Keep bed low and locked with side rails adjusted as appropriate  - Keep care items and personal belongings within reach  - Initiate and maintain comfort rounds  - Make Fall Risk Sign visible to staff  - Offer Toileting every 2 Hours, in advance of need  - Apply yellow socks and bracelet for high fall risk patients  - Consider moving patient to room near nurses station  Outcome: Progressing  Goal: Maintains/Returns to pre admission functional level  Description: INTERVENTIONS:  - Perform BMAT or MOVE assessment daily    - Set and communicate daily mobility goal to care team and patient/family/caregiver     - Collaborate with rehabilitation services on mobility goals if consulted  - Out of bed for toileting  - Record patient progress and toleration of activity level   Outcome: Progressing     Problem: Prexisting or High Potential for Compromised Skin Integrity  Goal: Skin integrity is maintained or improved  Description: INTERVENTIONS:  - Identify patients at risk for skin breakdown  - Assess and monitor skin integrity  - Assess and monitor nutrition and hydration status  - Monitor labs   - Assess for incontinence   - Turn and reposition patient  - Assist with mobility/ambulation  - Relieve pressure over bony prominences  - Avoid friction and shearing  - Provide appropriate hygiene as needed including keeping skin clean and dry  - Evaluate need for skin moisturizer/barrier cream  - Collaborate with interdisciplinary team   - Patient/family teaching  - Consider wound care consult   Outcome: Progressing     Problem: GASTROINTESTINAL - ADULT  Goal: Minimal or absence of nausea and/or vomiting  Description: INTERVENTIONS:  - Administer IV fluids if ordered to ensure adequate hydration  - Maintain NPO status until nausea and vomiting are resolved  - Nasogastric tube if ordered  - Administer ordered antiemetic medications as needed  - Provide nonpharmacologic comfort measures as appropriate  - Advance diet as tolerated, if ordered  - Consider nutrition services referral to assist patient with adequate nutrition and appropriate food choices  Outcome: Progressing  Goal: Maintains or returns to baseline bowel function  Description: INTERVENTIONS:  - Assess bowel function  - Encourage oral fluids to ensure adequate hydration  - Administer IV fluids if ordered to ensure adequate hydration  - Administer ordered medications as needed  - Encourage mobilization and activity  - Consider nutritional services referral to assist patient with adequate nutrition and appropriate food choices  Outcome: Progressing  Goal: Maintains adequate nutritional intake  Description: INTERVENTIONS:  - Monitor percentage of each meal consumed  - Identify factors contributing to decreased intake, treat as appropriate  - Assist with meals as needed  - Monitor I&O, weight, and lab values if indicated  - Obtain nutrition services referral as needed  Outcome: Progressing  Goal: Establish and maintain optimal ostomy function  Description: INTERVENTIONS:  - Assess bowel function  - Encourage oral fluids to ensure adequate hydration  - Administer IV fluids if ordered to ensure adequate hydration   - Administer ordered medications as needed  - Encourage mobilization and activity  - Nutrition services referral to assist patient with appropriate food choices  - Assess stoma site  - Consider wound care consult   Outcome: Progressing  Goal: Oral mucous membranes remain intact  Description: INTERVENTIONS  - Assess oral mucosa and hygiene practices  - Implement preventative oral hygiene regimen  - Implement oral medicated treatments as ordered  - Initiate Nutrition services referral as needed  Outcome: Progressing     Problem: PAIN - ADULT  Goal: Verbalizes/displays adequate comfort level or baseline comfort level  Description: Interventions:  - Encourage patient to monitor pain and request assistance  - Assess pain using appropriate pain scale  - Administer analgesics based on type and severity of pain and evaluate response  - Implement non-pharmacological measures as appropriate and evaluate response  - Consider cultural and social influences on pain and pain management  - Notify physician/advanced practitioner if interventions unsuccessful or patient reports new pain  Outcome: Progressing     Problem: INFECTION - ADULT  Goal: Absence or prevention of progression during hospitalization  Description: INTERVENTIONS:  - Assess and monitor for signs and symptoms of infection  - Monitor lab/diagnostic results  - Monitor all insertion sites, i e  indwelling lines, tubes, and drains  - Monitor endotracheal if appropriate and nasal secretions for changes in amount and color  - [place] appropriate cooling/warming therapies per order  - Administer medications as ordered  - Instruct and encourage patient and family to use good hand hygiene technique  - Identify and instruct in appropriate isolation precautions for identified infection/condition  Outcome: Progressing     Problem: SAFETY ADULT  Goal: Patient will remain free of falls  Description: INTERVENTIONS:  - Educate patient/family on patient safety including physical limitations  - Instruct patient to call for assistance with activity   - Consult OT/PT to assist with strengthening/mobility   - Keep Call bell within reach  - Keep bed low and locked with side rails adjusted as appropriate  - Keep care items and personal belongings within reach  - Initiate and maintain comfort rounds  - Make Fall Risk Sign visible to staff  - Offer Toileting every 2 Hours, in advance of need  - Apply yellow socks and bracelet for high fall risk patients  - Consider moving patient to room near nurses station  Outcome: Progressing  Goal: Maintain or return to baseline ADL function  Description: INTERVENTIONS:  - Educate patient/family on patient safety including physical limitations  - Instruct patient to call for assistance with activity   - Consult OT/PT to assist with strengthening/mobility   - Keep Call bell within reach  - Keep bed low and locked with side rails adjusted as appropriate  - Keep care items and personal belongings within reach  - Initiate and maintain comfort rounds  - Make Fall Risk Sign visible to staff  - Offer Toileting every 2 Hours, in advance of need  - Apply yellow socks and bracelet for high fall risk patients  - Consider moving patient to room near nurses station  Outcome: Progressing  Goal: Maintains/Returns to pre admission functional level  Description: INTERVENTIONS:  - Perform BMAT or MOVE assessment daily    - Set and communicate daily mobility goal to care team and patient/family/caregiver     - Collaborate with rehabilitation services on mobility goals if consulted  - Out of bed for toileting  - Record patient progress and toleration of activity level   Outcome: Progressing     Problem: DISCHARGE PLANNING  Goal: Discharge to home or other facility with appropriate resources  Description: INTERVENTIONS:  - Identify barriers to discharge w/patient and caregiver  - Arrange for needed discharge resources and transportation as appropriate  - Identify discharge learning needs (meds, wound care, etc )  - Arrange for interpretive services to assist at discharge as needed  - Refer to Case Management Department for coordinating discharge planning if the patient needs post-hospital services based on physician/advanced practitioner order or complex needs related to functional status, cognitive ability, or social support system  Outcome: Progressing     Problem: Knowledge Deficit  Goal: Patient/family/caregiver demonstrates understanding of disease process, treatment plan, medications, and discharge instructions  Description: Complete learning assessment and assess knowledge base    Interventions:  - Provide teaching at level of understanding  - Provide teaching via preferred learning methods  Outcome: Progressing

## 2022-02-22 NOTE — PROGRESS NOTES
Progress Note- Shane Rivera 80 y o  female MRN: [id number]    Unit/Bed#: -01 Encounter: [id number]      Assessment and Plan:    Patient is a 45-year-old female with PMH significant for AFib on Coumadin, CHF, depression with anxiety, hypothyroidism, morbid obesity who presented to the ED on 02/18 with significant diarrhea in the setting of a persistent C diff infection  Previously treated with a ten-day course of vancomycin with persistent diarrhea and repeat C diff testing with positive PCR/EIA today  CBC with overall stable hgb and without leukocytosis  Therapeutic INR (2 14)  BMP unremarkable  Resumed oral vancomycin 125 mg q6 hrs with improvement in stool frequency and consistency and overall improvement of her sxs's  Tolerating regular diet  Patient will require outpatient GI follow-up in 1 week with recommended extended vancomycin taper as follows: 125 mg 4 times daily for 14 days, then 125 mg twice daily for 7 days, then 125 mg once daily for 7 days, then 125 mg every 2 or 3 days for 2 to 8 weeks  Of note, patient prefers to follow-up with GI at [address] where she has previously been seen - Will make note of this on her discharge summary  Gi will sign off at this time  Please contact with any questions or concerns  ______________________________________________________________________    Subjective:     Patient found lying comfortably in her bed  No acute events overnight  States she is feeling well today  Admits to improvement in stool frequency and consistency - Has only had one semi-solid BM yesterday and has yet to have a BM today  Admits to some mild RLQ abdominal discomfort, but states this is improved since admission  Tolerating diet, without nausea/vomtiing  Denies all other GI-related complaints  Patient prefers to follow-up with GI at [facility]       Medication Administration - last 24 hours from 02/21/2022 1108 to 02/22/2022 1108 Date/Time Order Dose Route Action Action by     02/22/2022 0554 vancomycin (VANCOCIN) oral solution 125 mg 125 mg Oral Given Oren Sequeira, VALARIE     02/22/2022 0020 vancomycin (VANCOCIN) oral solution 125 mg 125 mg Oral Given Oren Sequeira, VALARIE     02/21/2022 1731 vancomycin (VANCOCIN) oral solution 125 mg 125 mg Oral Given Neo Quinones, VALARIE     02/21/2022 1207 vancomycin (VANCOCIN) oral solution 125 mg 125 mg Oral Given Neo Quinones, RN     02/22/2022 0449 allopurinol (ZYLOPRIM) tablet 100 mg 100 mg Oral Given Neo Quinones, VALARIE     02/22/2022 0818 DULoxetine (CYMBALTA) delayed release capsule 20 mg 20 mg Oral Given Neo Quinones, RN     02/22/2022 0554 levothyroxine tablet 125 mcg 125 mcg Oral Given Oren Sequeira, VALARIE     02/21/2022 1756 warfarin (COUMADIN) tablet 5 mg 5 mg Oral Given Neo Quinones, RN     02/22/2022 8778 triamcinolone (KENALOG) 0 1 % cream   Topical Given Neo Quinones RN     02/21/2022 1756 triamcinolone (KENALOG) 0 1 % cream   Topical Refused Neo Quinones, VALARIE          Objective:     Vitals: Blood pressure 112/57, pulse 69, temperature 98 2 °F (36 8 °C), resp  rate 18, height 5' 5" (1 651 m), weight (!) 214 kg (472 lb 7 1 oz), SpO2 96 %  ,Body mass index is 78 62 kg/m²  Intake/Output Summary (Last 24 hours) at 2/22/2022 1108  Last data filed at 2/22/2022 2391  Gross per 24 hour   Intake 840 ml   Output --   Net 840 ml       Physical Exam:   General Appearance: Awake and alert, in no acute distress  Abdomen: Physical exam limited 2/2 body habitus;  Soft, +mild RLQ abd TTP, non-distended; bowel sounds normal; no masses or no organomegaly    Invasive Devices  Report    None                 Lab Results:  Admission on 02/18/2022   Component Date Value    WBC 02/18/2022 8 63     RBC 02/18/2022 4 43     Hemoglobin 02/18/2022 11 1*    Hematocrit 02/18/2022 37 9     MCV 02/18/2022 86     MCH 02/18/2022 25 1*    MCHC 02/18/2022 29 3*    RDW 02/18/2022 16 6*    MPV 02/18/2022 8 2*    Platelets 02/18/2022 386     nRBC 02/18/2022 0     Neutrophils Relative 02/18/2022 76*    Immat GRANS % 02/18/2022 0     Lymphocytes Relative 02/18/2022 14     Monocytes Relative 02/18/2022 6     Eosinophils Relative 02/18/2022 3     Basophils Relative 02/18/2022 1     Neutrophils Absolute 02/18/2022 6 60     Immature Grans Absolute 02/18/2022 0 03     Lymphocytes Absolute 02/18/2022 1 24     Monocytes Absolute 02/18/2022 0 48     Eosinophils Absolute 02/18/2022 0 24     Basophils Absolute 02/18/2022 0 04     Protime 02/18/2022 25 0*    INR 02/18/2022 2 34*    PTT 02/18/2022 60*    Color, UA 02/19/2022 Yellow     Clarity, UA 02/19/2022 Clear     Specific Gravity, UA 02/19/2022 1 025     pH, UA 02/19/2022 6 0     Leukocytes, UA 02/19/2022 Negative     Nitrite, UA 02/19/2022 Negative     Protein, UA 02/19/2022 Negative     Glucose, UA 02/19/2022 Negative     Ketones, UA 02/19/2022 Negative     Urobilinogen, UA 02/19/2022 0 2     Bilirubin, UA 02/19/2022 Negative     Blood, UA 02/19/2022 Negative     Sodium 02/18/2022 136     Potassium 02/18/2022 4 1     Chloride 02/18/2022 98     CO2 02/18/2022 33*    ANION GAP 02/18/2022 5     BUN 02/18/2022 20     Creatinine 02/18/2022 0 96     Glucose 02/18/2022 104     Calcium 02/18/2022 8 8     Corrected Calcium 02/18/2022 9 5     AST 02/18/2022 15     ALT 02/18/2022 7     Alkaline Phosphatase 02/18/2022 111*    Total Protein 02/18/2022 7 7     Albumin 02/18/2022 3 1*    Total Bilirubin 02/18/2022 0 99     eGFR 02/18/2022 60     Lipase 02/18/2022 26     hs TnI 0hr 02/18/2022 6     hs TnI 2hr 02/18/2022 5     Delta 2hr hsTnI 02/18/2022 -1     Protime 02/19/2022 23 6*    INR 02/19/2022 2 16*    WBC 02/19/2022 9 56     RBC 02/19/2022 4 13     Hemoglobin 02/19/2022 10 3*    Hematocrit 02/19/2022 35 4     MCV 02/19/2022 86     MCH 02/19/2022 24 9*    MCHC 02/19/2022 29 1*    RDW 02/19/2022 16 5*    Platelets 01/03/5334 382     MPV 02/19/2022 8 2*    Sodium 02/19/2022 137     Potassium 02/19/2022 4 4     Chloride 02/19/2022 99     CO2 02/19/2022 32     ANION GAP 02/19/2022 6     BUN 02/19/2022 19     Creatinine 02/19/2022 0 86     Glucose 02/19/2022 107     Calcium 02/19/2022 8 9     eGFR 02/19/2022 68     Procalcitonin 02/19/2022 0 05     Protime 02/20/2022 23 5*    INR 02/20/2022 2 14*    WBC 02/21/2022 7 46     RBC 02/21/2022 4 05     Hemoglobin 02/21/2022 10 1*    Hematocrit 02/21/2022 34 5*    MCV 02/21/2022 85     MCH 02/21/2022 24 9*    MCHC 02/21/2022 29 3*    RDW 02/21/2022 16 7*    Platelets 89/57/1011 371     MPV 02/21/2022 8 4*    Sodium 02/21/2022 137     Potassium 02/21/2022 4 1     Chloride 02/21/2022 101     CO2 02/21/2022 29     ANION GAP 02/21/2022 7     BUN 02/21/2022 23     Creatinine 02/21/2022 1 10     Glucose 02/21/2022 105     Calcium 02/21/2022 8 8     eGFR 02/21/2022 50     Magnesium 02/21/2022 2 2        Imaging Studies: I have personally reviewed pertinent imaging studies

## 2022-02-22 NOTE — CASE MANAGEMENT
Case Management Discharge Planning Note    Patient name Elizabeth Sierra  Location /-01 MRN [id number]  : 1950 Date 2022       Current Admission Date: 2022  Current Admission Diagnosis:C  difficile colitis   Patient Active Problem List    Diagnosis Date Noted    Chronic atrial fibrillation ([facility] ) 2022    C  difficile colitis 2022    Diarrhea of presumed infectious origin 2022    Generalized abdominal pain     Lymphedema of extremity 2021    Paroxysmal atrial fibrillation ([facility] ) 10/03/2020    Other specified hypothyroidism 10/03/2020    Mild episode of recurrent major depressive disorder ([place] [address] ) 10/03/2020    Idiopathic chronic 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 10/03/2020    Primary osteoarthritis involving multiple joints 10/03/2020    Morbid obesity with BMI of 60 0-69 9, adult ([place] [address] ) 10/03/2020    Chronic diastolic congestive heart failure ([place] [address] ) 10/03/2020    Gastroesophageal reflux disease without esophagitis 10/03/2020    Hx MRSA infection 2020    Left ovarian cyst 2017    Depression with anxiety 07/15/2017    Bilateral lower extremity edema 2017    Chronic pain disorder 2017    Anemia 2016    Ambulatory dysfunction 2016    Stage 3a chronic kidney disease ([place] [address] ) 2016    Adjustment disorder 2013    Renal insufficiency 2013    Irritable bowel syndrome 2012    Left atrial enlargement 2012    Left ventricular hypertrophy 2012    Carpal tunnel syndrome 2012    Gout 2012    Hyperlipidemia 2012    Osteoarthritis 2012    Symptomatic menopausal or female climacteric states 2012      LOS (days): 3  Geometric Mean LOS (GMLOS) (days): 3 80  Days to GMLOS:0 6     OBJECTIVE:  Risk of Unplanned Readmission Score: 16         Current admission status: Inpatient   Preferred Pharmacy:    [address] [place]  [facility] [address] [place] [address]  Phone: [phone] Fax: [phone]    Primary Care Provider: ORACIO Vazquez    Primary Insurance: MEDICARE  Secondary Insurance: AARP    DISCHARGE DETAILS:    Discharge planning discussed with[de-identified] patient and niece at 14:11 pm  Freedom of Choice: Yes  Comments - Freedom of Choice: pt is active with  Jes Peraza   pt has private care givers from [address] [facility]  8am-8pm  7days   # [phone]  CM contacted family/caregiver?: Yes  Were Treatment Team discharge recommendations reviewed with patient/caregiver?: Yes  Did patient/caregiver verbalize understanding of patient care needs?: Yes  Were patient/caregiver advised of the risks associated with not following Treatment Team discharge recommendations?: Yes    Contacts  Patient Contacts: Mignon Babinski  Relationship to Patient[de-identified] Family (niece)  Contact Method: Phone  Phone Number: [phone]  Reason/Outcome: Discharge [address]         [place] the [place] [address] [place] discharge?: Yes    DME Referral Provided  Referral made for DME?: No    Other Referral/Resources/Interventions Provided:  Interventions: HHC,Other (Specify) (Jes Peraza and [address]  private care givers)  Referral Comments: Jes Peraza was called with d/c & avs was faxed to [phone]  ,  [address] was caleld at 09:15am with the d/c , cm was told that the care giver will be at the house to receive the pt , pt has a 14:30pm transport time, pt spoke with her care giver and she did receive confirmation that she will be there to receive the pt when she arrives    Would you like to participate in our [address] service program?  : No - Declined    Treatment Team Recommendation: Home with [id number] ([facility] and care givers)  Discharge Destination Plan[de-identified] Home with [address],[address] [facility] and care givers from [address] [address] [facility])     Dispatcher Contacted: Yes  Number/Name of Dispatcher: [phone]  Transported by Elida and Unit #):  Ayo  ETA of Transport (Date): 02/22/22  ETA of Transport (Time): 1430  Transport Service Arrived: Yes  Transfer Mode: Stretcher  Accompanied by: EMS personnel           Family notified[de-identified] niece was called

## 2022-02-22 NOTE — PLAN OF CARE
Problem: Potential for Falls  Goal: Patient will remain free of falls  Description: INTERVENTIONS:  - Educate patient/family on patient safety including physical limitations  - Instruct patient to call for assistance with activity   - Consult OT/PT to assist with strengthening/mobility   - Keep Call bell within reach  - Keep bed low and locked with side rails adjusted as appropriate  - Keep care items and personal belongings within reach  - Initiate and maintain comfort rounds  - Make Fall Risk Sign visible to staff  - Offer Toileting every 2 Hours, in advance of need  - Apply yellow socks and bracelet for high fall risk patients  - Consider moving patient to room near nurses station  Outcome: Adequate for Discharge     Problem: MOBILITY - ADULT  Goal: Maintain or return to baseline ADL function  Description: INTERVENTIONS:  - Educate patient/family on patient safety including physical limitations  - Instruct patient to call for assistance with activity   - Consult OT/PT to assist with strengthening/mobility   - Keep Call bell within reach  - Keep bed low and locked with side rails adjusted as appropriate  - Keep care items and personal belongings within reach  - Initiate and maintain comfort rounds  - Make Fall Risk Sign visible to staff  - Offer Toileting every 2 Hours, in advance of need  - Apply yellow socks and bracelet for high fall risk patients  - Consider moving patient to room near nurses station  Outcome: Adequate for Discharge  Goal: Maintains/Returns to pre admission functional level  Description: INTERVENTIONS:  - Perform BMAT or MOVE assessment daily    - Set and communicate daily mobility goal to care team and patient/family/caregiver     - Collaborate with rehabilitation services on mobility goals if consulted  - Out of bed for toileting  - Record patient progress and toleration of activity level   Outcome: Adequate for Discharge     Problem: Prexisting or High Potential for Compromised Skin Integrity  Goal: Skin integrity is maintained or improved  Description: INTERVENTIONS:  - Identify patients at risk for skin breakdown  - Assess and monitor skin integrity  - Assess and monitor nutrition and hydration status  - Monitor labs   - Assess for incontinence   - Turn and reposition patient  - Assist with mobility/ambulation  - Relieve pressure over bony prominences  - Avoid friction and shearing  - Provide appropriate hygiene as needed including keeping skin clean and dry  - Evaluate need for skin moisturizer/barrier cream  - Collaborate with interdisciplinary team   - Patient/family teaching  - Consider wound care consult   Outcome: Adequate for Discharge     Problem: GASTROINTESTINAL - ADULT  Goal: Minimal or absence of nausea and/or vomiting  Description: INTERVENTIONS:  - Administer IV fluids if ordered to ensure adequate hydration  - Maintain NPO status until nausea and vomiting are resolved  - Nasogastric tube if ordered  - Administer ordered antiemetic medications as needed  - Provide nonpharmacologic comfort measures as appropriate  - Advance diet as tolerated, if ordered  - Consider nutrition services referral to assist patient with adequate nutrition and appropriate food choices  Outcome: Adequate for Discharge  Goal: Maintains or returns to baseline bowel function  Description: INTERVENTIONS:  - Assess bowel function  - Encourage oral fluids to ensure adequate hydration  - Administer IV fluids if ordered to ensure adequate hydration  - Administer ordered medications as needed  - Encourage mobilization and activity  - Consider nutritional services referral to assist patient with adequate nutrition and appropriate food choices  Outcome: Adequate for Discharge  Goal: Maintains adequate nutritional intake  Description: INTERVENTIONS:  - Monitor percentage of each meal consumed  - Identify factors contributing to decreased intake, treat as appropriate  - Assist with meals as needed  - Monitor I&O, weight, and lab values if indicated  - Obtain nutrition services referral as needed  Outcome: Adequate for Discharge  Goal: Establish and maintain optimal ostomy function  Description: INTERVENTIONS:  - Assess bowel function  - Encourage oral fluids to ensure adequate hydration  - Administer IV fluids if ordered to ensure adequate hydration   - Administer ordered medications as needed  - Encourage mobilization and activity  - Nutrition services referral to assist patient with appropriate food choices  - Assess stoma site  - Consider wound care consult   Outcome: Adequate for Discharge  Goal: Oral mucous membranes remain intact  Description: INTERVENTIONS  - Assess oral mucosa and hygiene practices  - Implement preventative oral hygiene regimen  - Implement oral medicated treatments as ordered  - Initiate Nutrition services referral as needed  Outcome: Adequate for Discharge     Problem: PAIN - ADULT  Goal: Verbalizes/displays adequate comfort level or baseline comfort level  Description: Interventions:  - Encourage patient to monitor pain and request assistance  - Assess pain using appropriate pain scale  - Administer analgesics based on type and severity of pain and evaluate response  - Implement non-pharmacological measures as appropriate and evaluate response  - Consider cultural and social influences on pain and pain management  - Notify physician/advanced practitioner if interventions unsuccessful or patient reports new pain  Outcome: Adequate for Discharge     Problem: INFECTION - ADULT  Goal: Absence or prevention of progression during hospitalization  Description: INTERVENTIONS:  - Assess and monitor for signs and symptoms of infection  - Monitor lab/diagnostic results  - Monitor all insertion sites, i e  indwelling lines, tubes, and drains  - Monitor endotracheal if appropriate and nasal secretions for changes in amount and color  - Carson appropriate cooling/warming therapies per order  - Administer medications as ordered  - Instruct and encourage patient and family to use good hand hygiene technique  - Identify and instruct in appropriate isolation precautions for identified infection/condition  Outcome: Adequate for Discharge     Problem: SAFETY ADULT  Goal: Patient will remain free of falls  Description: INTERVENTIONS:  - Educate patient/family on patient safety including physical limitations  - Instruct patient to call for assistance with activity   - Consult OT/PT to assist with strengthening/mobility   - Keep Call bell within reach  - Keep bed low and locked with side rails adjusted as appropriate  - Keep care items and personal belongings within reach  - Initiate and maintain comfort rounds  - Make Fall Risk Sign visible to staff  - Offer Toileting every 2 Hours, in advance of need  - Apply yellow socks and bracelet for high fall risk patients  - Consider moving patient to room near nurses station  Outcome: Adequate for Discharge  Goal: Maintain or return to baseline ADL function  Description: INTERVENTIONS:  - Educate patient/family on patient safety including physical limitations  - Instruct patient to call for assistance with activity   - Consult OT/PT to assist with strengthening/mobility   - Keep Call bell within reach  - Keep bed low and locked with side rails adjusted as appropriate  - Keep care items and personal belongings within reach  - Initiate and maintain comfort rounds  - Make Fall Risk Sign visible to staff  - Offer Toileting every 2 Hours, in advance of need  - Apply yellow socks and bracelet for high fall risk patients  - Consider moving patient to room near nurses station  Outcome: Adequate for Discharge  Goal: Maintains/Returns to pre admission functional level  Description: INTERVENTIONS:  - Perform BMAT or MOVE assessment daily    - Set and communicate daily mobility goal to care team and patient/family/caregiver     - Collaborate with rehabilitation services on mobility goals if consulted  - Out of bed for toileting  - Record patient progress and toleration of activity level   Outcome: Adequate for Discharge     Problem: DISCHARGE PLANNING  Goal: Discharge to home or other facility with appropriate resources  Description: INTERVENTIONS:  - Identify barriers to discharge w/patient and caregiver  - Arrange for needed discharge resources and transportation as appropriate  - Identify discharge learning needs (meds, wound care, etc )  - Arrange for interpretive services to assist at discharge as needed  - Refer to Case Management Department for coordinating discharge planning if the patient needs post-hospital services based on physician/advanced practitioner order or complex needs related to functional status, cognitive ability, or social support system  Outcome: Adequate for Discharge     Problem: Knowledge Deficit  Goal: Patient/family/caregiver demonstrates understanding of disease process, treatment plan, medications, and discharge instructions  Description: Complete learning assessment and assess knowledge base    Interventions:  - Provide teaching at level of understanding  - Provide teaching via preferred learning methods  Outcome: Adequate for Discharge

## 2022-02-22 NOTE — ASSESSMENT & PLAN NOTE
· Patient presented to the ED with complaints of severe, persistent diarrhea  · History of c-diff infection in January, completed a 10 day course of oral Vancomycin    · C diff positive again on 02/18/2022  · GI input appreciated  · Will continue supportive care   · Advance diet as tolerated  · Oral vancomycin 125 mg q 6 hours while inpatient with extended taper course as an outpatient   · Overall medically stable for discharge

## 2022-02-22 NOTE — ASSESSMENT & PLAN NOTE
· Patient is bed bound and has aids 12 hours a day at home  · Home care is available for today patient can discharge home

## 2022-02-22 NOTE — CASE MANAGEMENT
Case Management Assessment & Discharge Planning Note    Patient name Marge García /-01 MRN [id number]  : 1950 Date 2022       Current Admission Date: 2022  Current Admission Diagnosis:C  difficile colitis   Patient Active Problem List    Diagnosis Date Noted    Chronic atrial fibrillation ([facility] ) 2022    C  difficile colitis 2022    Diarrhea of presumed infectious origin 2022    Generalized abdominal pain     Lymphedema of extremity 2021    Paroxysmal atrial fibrillation ([place] [address] ) 10/03/2020    Other specified hypothyroidism 10/03/2020    Mild episode of recurrent major depressive disorder ([place] [address] ) 10/03/2020    Idiopathic chronic 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 10/03/2020    Primary osteoarthritis involving multiple joints 10/03/2020    Morbid obesity with BMI of 60 0-69 9, adult ([place] [address] ) 10/03/2020    Chronic diastolic congestive heart failure ([place] [address] ) 10/03/2020    Gastroesophageal reflux disease without esophagitis 10/03/2020    Hx MRSA infection 2020    Left ovarian cyst 2017    Depression with anxiety 07/15/2017    Bilateral lower extremity edema 2017    Chronic pain disorder 2017    Anemia 2016    Ambulatory dysfunction 2016    Stage 3a chronic kidney disease ([place] [address] ) 2016    Adjustment disorder 2013    Renal insufficiency 2013    Irritable bowel syndrome 2012    Left atrial enlargement 2012    Left ventricular hypertrophy 2012    Carpal tunnel syndrome 2012    Gout 2012    Hyperlipidemia 2012    Osteoarthritis 2012    Symptomatic menopausal or female climacteric states 2012      LOS (days): 2  Geometric Mean LOS (GMLOS) (days): 3 80  Days to GMLOS:1 4     OBJECTIVE:    Risk of Unplanned Readmission Score: 15         Current admission status: Inpatient  Referral Reason: Other (d/c planning)    Preferred Pharmacy: 2025 [facility], [address] [place] [address] - [address]  [facility] 63 18 [facility] [address]  Phone: [phone] Fax: [phone]    Primary Care Provider: ORACIO Walker    Primary Insurance: MEDICARE  Secondary Insurance: AARP    ASSESSMENT:  Active Health Care Agents    There are no active Health Care Agents on file  Readmission Root Cause  30 Day Readmission: No    Patient Information  Admitted from[de-identified] Home  Mental Status: Alert  During Assessment patient was accompanied by: Not accompanied during assessment  Assessment information provided by[de-identified] Patient  Primary Caregiver: Private caregiver  Caregiver's Name[de-identified] Michael Morton Relationship to Patient[de-identified] Other (Specify) (caregivers  9a-9pm)  Caregiver's Telephone Number[de-identified] [phone]  Support Systems: Family members (niece)  [place] of Residence: [address] do you live in?: [facility] entry access options  Select all that apply : Ramp  Type of Current Residence: 2 story home (pt stays on the 1st floor)  Upon entering residence, is there a bedroom on the main floor (no further steps)?: Yes  Upon entering residence, is there a bathroom on the main floor (no further steps)?: Yes  In the last 12 months, was there a time when you were not able to pay the mortgage or rent on time?: No  In the last 12 months, how many places have you lived?: 1  In the last 12 months, was there a time when you did not have a steady place to sleep or slept in a shelter (including now)?: No  Homeless/housing insecurity resource given?: N/A  Living Arrangements: Lives Alone (pt has care givers  from 9am to 9pm)  Is patient a ?: No    Activities of Daily Living Prior to Admission  Functional Status: Assistance  Completes ADLs independently?: No  Level of ADL dependence: Assistance  Ambulates independently?: No  Level of ambulatory dependence:  Total Dependent  Does patient use assisted devices?: Yes  Assisted Devices (DME) used: Wheelchair  Does patient currently own DME?: Yes  What DME does the patient currently own?: Wheelchair,Hospital Bed,Other (Comment) (bed pan, pulse ox, bp cuff)  Does patient have a history of Outpatient Therapy (PT/OT)?: No  Does the patient have a history of Short-Term Rehab?: Yes (era and tre)  Does patient have a history of HHC?: Yes Eugenie Engel)  Does patient currently have [place] [address]?: Yes    Current Home Health Care  Type of Current Home Care Services: Nurse visit  [address] [place] [address][de-identified] [address] [place] [address] Provider[de-identified] PCP    Patient Information Continued  Income Source: Pension/FPC  Does patient have prescription coverage?: Yes (Susan Crain)  Within the past 12 months, you worried that your food would run out before you got the money to buy more : Never true  Within the past 12 months, the food you bought just didnt last and you didnt have money to get more : Never true  Food insecurity resource given?: N/A  Does patient receive dialysis treatments?: No  Does patient have a history of substance abuse?: No  Does patient have a history of Mental Health Diagnosis?: Yes (depression)  Is patient receiving treatment for mental health?: Yes (medication from her pcp)  Has patient received inpatient treatment related to mental health in the last 2 years?: No         Means of Transportation  [facility]ans [facility] Transport to [facility][de-identified] Other (Comment) (medical transport)  In the past 12 months, has lack of transportation kept you from medical appointments or from getting medications?: No  In the past 12 months, has lack of transportation kept you from meetings, work, or from getting things needed for daily living?: No  Was application for public transport provided?: N/A        DISCHARGE DETAILS:    Discharge planning discussed with[de-identified] patient and message was left for Herberth Reyes at 14:33 to # [phone]  spoke with Valerie Yeh at [facility] # [phone] and made him aaware of the planned d/c for tomoorw her aides are availabel cm will call them int the morning with the time to be at the house  [place] of Choice: Yes  Comments - Freedom of Choice: pt is active merari [address] for her wound care  CM contacted family/caregiver?: Yes             Contacts  Patient Contacts: Tomasz Biard  Relationship to Patient[de-identified] Family (niece)  Contact Method: Phone  Phone Number: [phone]  message was left to call to discuss d/c plan  Reason/Outcome: Discharge [address]         Is the patient interested in [facility] at discharge?: Yes  [address] requested[de-identified] [address] Name[de-identified] [address] [facility] [address] Provider[de-identified] PCP  [address] Needed[de-identified] Wound/Ostomy Care  Homebound Criteria Met[de-identified] Uses an Assist Device (i e  cane, walker, etc),Requires the Assistance of Another Person for Safe Ambulation or to Leave the Home  Supporting Clincal Findings[de-identified] Bed Bound or Wheelchair Bound    DME Referral Provided  Referral made for DME?: No    Other Referral/Resources/Interventions Provided:  Interventions: HHC,AIDS  Referral Comments: Debbie Baeza and Reshma Moy    Would you like to participate in our [address] service program?  : No - Declined    Treatment Team Recommendation: Home with [address] (home merari Baeza and her care givers)     Transport at Discharge : [facility] Ambulance (pt will need bls transport)                             IMM Given (Date):: 02/21/22  IMM Given to[de-identified] Patient  Family notified[de-identified] message was left for the pt's niece

## 2022-02-22 NOTE — ASSESSMENT & PLAN NOTE
· No signs of acute exacerbation  · Will continue to monitor I&Os, daily weight  · Diuretics held due to continued diarrhea can be resumed upon discharge improvement of bowel movement

## 2022-02-23 NOTE — CASE MANAGEMENT
Case Management Progress Note    Patient name Josh Blanc  Location [address] 215/-01 MRN [id number]  : 1950 Date 2022       LOS (days): 3  Geometric Mean LOS (GMLOS) (days): 3 80  Days to GMLOS:0 6        OBJECTIVE:        Current admission status: Inpatient  Preferred Pharmacy:   [address] [address]  Phone: [phone] Fax: [phone]    [facility], [address]  [address]  [facility] [address]  Phone: [phone] Fax: [phone]    Primary Care Provider: ORACIO Hannon    Primary Insurance: MEDICARE  Secondary Insurance: AARP    PROGRESS NOTE:    Cm was made aware the pt's pharmacy does not carry Vancomycin oral solution, cm called [facility] at [address] [place] [address] and they do not have the liquid  But they do have the capsules, cm the called [facility] and they do have the liquid in stock, cm called the pt at 13:38 pm to # [phone] and I made her aware of this information, she asked me to check [facility] since it is closer to her, cm called [facility] and they do not have it in stock, I was told I could find it at a speciality shop, cm called the pt back at 13:49 pm and she stated to send a rx to [facility] , she stated her care giver can get the rx, I made her aware that she needs to give the pharmacy her insurance information, rx was faxed as requested,pharmcy was called and made aware the rx was coming, wilfred received a comfirmation that the fax did go through

## 2022-02-23 NOTE — QUICK NOTE
Notified by case management, that the patient went to  the vancomycin prescription at her pharmacy, however her pharmacy notified her that they do not carry this medication    A prescription was recent to [facility] in [place], [place] on 2/23/2022

## 2022-03-27 ENCOUNTER — HOSPITAL ENCOUNTER (EMERGENCY)
Facility: HOSPITAL | Age: 72
Discharge: HOME | End: 2022-03-27
Attending: EMERGENCY MEDICINE | Admitting: EMERGENCY MEDICINE
Payer: MEDICARE

## 2022-03-27 ENCOUNTER — APPOINTMENT (EMERGENCY)
Dept: RADIOLOGY | Facility: HOSPITAL | Age: 72
End: 2022-03-27
Payer: MEDICARE

## 2022-03-27 VITALS
BODY MASS INDEX: 78.51 KG/M2 | SYSTOLIC BLOOD PRESSURE: 128 MMHG | DIASTOLIC BLOOD PRESSURE: 63 MMHG | TEMPERATURE: 97.9 F | OXYGEN SATURATION: 100 % | HEART RATE: 62 BPM | WEIGHT: 293 LBS | RESPIRATION RATE: 17 BRPM

## 2022-03-27 DIAGNOSIS — R07.9 CHEST PAIN: Primary | ICD-10-CM

## 2022-03-27 LAB
2HR DELTA HS TROPONIN: 0 NG/L
ANION GAP SERPL CALCULATED.3IONS-SCNC: 6 MMOL/L (ref 4–13)
APTT PPP: 53 SECONDS (ref 23–37)
BASOPHILS # BLD AUTO: 0.06 THOUSANDS/ΜL (ref 0–0.1)
BASOPHILS NFR BLD AUTO: 1 % (ref 0–1)
BNP SERPL-MCNC: 166 PG/ML (ref 1–100)
BUN SERPL-MCNC: 24 MG/DL (ref 5–25)
CALCIUM SERPL-MCNC: 9 MG/DL (ref 8.4–10.2)
CARDIAC TROPONIN I PNL SERPL HS: 5 NG/L
CARDIAC TROPONIN I PNL SERPL HS: 5 NG/L
CHLORIDE SERPL-SCNC: 98 MMOL/L (ref 96–108)
CO2 SERPL-SCNC: 33 MMOL/L (ref 21–32)
CREAT SERPL-MCNC: 0.99 MG/DL (ref 0.6–1.3)
D DIMER PPP FEU-MCNC: <0.27 UG/ML FEU
EOSINOPHIL # BLD AUTO: 0.25 THOUSAND/ΜL (ref 0–0.61)
EOSINOPHIL NFR BLD AUTO: 3 % (ref 0–6)
ERYTHROCYTE [DISTWIDTH] IN BLOOD BY AUTOMATED COUNT: 16.7 % (ref 11.6–15.1)
GFR SERPL CREATININE-BSD FRML MDRD: 57 ML/MIN/1.73SQ M
GLUCOSE SERPL-MCNC: 115 MG/DL (ref 65–140)
HCT VFR BLD AUTO: 40.1 % (ref 34.8–46.1)
HGB BLD-MCNC: 11.5 G/DL (ref 11.5–15.4)
IMM GRANULOCYTES # BLD AUTO: 0.02 THOUSAND/UL (ref 0–0.2)
IMM GRANULOCYTES NFR BLD AUTO: 0 % (ref 0–2)
INR PPP: 2.2 (ref 0.84–1.19)
LYMPHOCYTES # BLD AUTO: 1.29 THOUSANDS/ΜL (ref 0.6–4.47)
LYMPHOCYTES NFR BLD AUTO: 15 % (ref 14–44)
MCH RBC QN AUTO: 24.8 PG (ref 26.8–34.3)
MCHC RBC AUTO-ENTMCNC: 28.7 G/DL (ref 31.4–37.4)
MCV RBC AUTO: 87 FL (ref 82–98)
MONOCYTES # BLD AUTO: 0.51 THOUSAND/ΜL (ref 0.17–1.22)
MONOCYTES NFR BLD AUTO: 6 % (ref 4–12)
NEUTROPHILS # BLD AUTO: 6.27 THOUSANDS/ΜL (ref 1.85–7.62)
NEUTS SEG NFR BLD AUTO: 75 % (ref 43–75)
NRBC BLD AUTO-RTO: 0 /100 WBCS
PLATELET # BLD AUTO: 444 THOUSANDS/UL (ref 149–390)
PMV BLD AUTO: 8.3 FL (ref 8.9–12.7)
POTASSIUM SERPL-SCNC: 4.3 MMOL/L (ref 3.5–5.3)
PROTHROMBIN TIME: 23.9 SECONDS (ref 11.6–14.5)
RBC # BLD AUTO: 4.63 MILLION/UL (ref 3.81–5.12)
SODIUM SERPL-SCNC: 137 MMOL/L (ref 135–147)
WBC # BLD AUTO: 8.4 THOUSAND/UL (ref 4.31–10.16)

## 2022-03-27 PROCEDURE — 99285 EMERGENCY DEPT VISIT HI MDM: CPT

## 2022-03-27 PROCEDURE — 99285 EMERGENCY DEPT VISIT HI MDM: CPT | Performed by: EMERGENCY MEDICINE

## 2022-03-27 PROCEDURE — 85730 THROMBOPLASTIN TIME PARTIAL: CPT | Performed by: EMERGENCY MEDICINE

## 2022-03-27 PROCEDURE — 93005 ELECTROCARDIOGRAM TRACING: CPT

## 2022-03-27 PROCEDURE — 85610 PROTHROMBIN TIME: CPT | Performed by: EMERGENCY MEDICINE

## 2022-03-27 PROCEDURE — 84484 ASSAY OF TROPONIN QUANT: CPT | Performed by: EMERGENCY MEDICINE

## 2022-03-27 PROCEDURE — 96374 THER/PROPH/DIAG INJ IV PUSH: CPT

## 2022-03-27 PROCEDURE — 83880 ASSAY OF NATRIURETIC PEPTIDE: CPT | Performed by: EMERGENCY MEDICINE

## 2022-03-27 PROCEDURE — 71045 X-RAY EXAM CHEST 1 VIEW: CPT

## 2022-03-27 PROCEDURE — 85379 FIBRIN DEGRADATION QUANT: CPT | Performed by: EMERGENCY MEDICINE

## 2022-03-27 PROCEDURE — 80048 BASIC METABOLIC PNL TOTAL CA: CPT | Performed by: EMERGENCY MEDICINE

## 2022-03-27 PROCEDURE — 85025 COMPLETE CBC W/AUTO DIFF WBC: CPT | Performed by: EMERGENCY MEDICINE

## 2022-03-27 PROCEDURE — 36415 COLL VENOUS BLD VENIPUNCTURE: CPT | Performed by: EMERGENCY MEDICINE

## 2022-03-27 RX ORDER — MORPHINE SULFATE 4 MG/ML
4 INJECTION, SOLUTION INTRAMUSCULAR; INTRAVENOUS ONCE
Status: DISCONTINUED | OUTPATIENT
Start: 2022-03-27 | End: 2022-03-27 | Stop reason: HOSPADM

## 2022-03-27 RX ORDER — MAGNESIUM HYDROXIDE/ALUMINUM HYDROXICE/SIMETHICONE 120; 1200; 1200 MG/30ML; MG/30ML; MG/30ML
30 SUSPENSION ORAL ONCE
Status: COMPLETED | OUTPATIENT
Start: 2022-03-27 | End: 2022-03-27

## 2022-03-27 RX ORDER — ASPIRIN 325 MG
325 TABLET ORAL ONCE
Status: COMPLETED | OUTPATIENT
Start: 2022-03-27 | End: 2022-03-27

## 2022-03-27 RX ORDER — SUCRALFATE 1 G/1
1 TABLET ORAL ONCE
Status: COMPLETED | OUTPATIENT
Start: 2022-03-27 | End: 2022-03-27

## 2022-03-27 RX ORDER — SODIUM CHLORIDE 9 MG/ML
3 INJECTION INTRAVENOUS
Status: DISCONTINUED | OUTPATIENT
Start: 2022-03-27 | End: 2022-03-27 | Stop reason: HOSPADM

## 2022-03-27 RX ADMIN — SUCRALFATE 1 G: 1 TABLET ORAL at 13:22

## 2022-03-27 RX ADMIN — ASPIRIN 325 MG: 325 TABLET ORAL at 12:22

## 2022-03-27 RX ADMIN — ALUMINUM HYDROXIDE, MAGNESIUM HYDROXIDE, AND SIMETHICONE 30 ML: 200; 200; 20 SUSPENSION ORAL at 13:08

## 2022-03-27 RX ADMIN — FAMOTIDINE 20 MG: 10 INJECTION INTRAVENOUS at 13:08

## 2022-03-27 NOTE — ED NOTES
Call made to [facility] [phone] regarding transfer home for patient        Javier Harris RN  03/27/22 12 [facility] Miko Sotelo RN  03/27/22 9457

## 2022-03-27 NOTE — ED PROVIDER NOTES
History  Chief Complaint   Patient presents with    Chest Pain     Patient reports chest tightness for four days  Patient reports calling her nurse practitoner and they advised her to come here  Patient is a 73-year-old female with history of morbid obesity, atrial fibrillation on warfarin, CHF, CHF that presents for evaluation of chest pain  Patient says over the past 4 days she has been having constant chest pain that she describes as substernal, nonradiating, described as chest tightness  It is mild to moderate without alleviating or exacerbating factors  She denies associated dyspnea, nausea vomiting, diaphoresis  She has not had pain like this before in the past   She denies any recent lower extremity swelling or weight change  Patient is bedbound at baseline and has a nursing aide that lives with her to help her with her daily needs, patient is nearly 500 lb  Patient otherwise denies abdominal pain, urinary or bowel symptoms  Patient has been compliant with her medications  She denies fever, chills, rigors  Prior to Admission Medications   Prescriptions Last Dose Informant Patient Reported? Taking?    DULoxetine (CYMBALTA) 20 mg capsule  Self No No   Sig: Take 1 capsule (20 mg total) by mouth daily   acetaminophen (TYLENOL) 650 mg CR tablet  Self Yes No   Sig: Take 650 mg by mouth every 8 (eight) hours   allopurinol (ZYLOPRIM) 100 mg tablet  Self No No   Sig: Take 1 tablet (100 mg total) by mouth daily   furosemide (LASIX) 40 mg tablet  Self No No   Sig: Take 1 tablet (40 mg total) by mouth daily   Patient taking differently: Take 20 mg by mouth daily as needed Prn weight gain 10 pounds    levothyroxine 125 mcg tablet  Self Yes No   Sig: Take 125 mcg by mouth daily   nystatin (MYCOSTATIN) powder  Self No No   Sig: Apply topically 2 (two) times a day   triamcinolone (KENALOG) 0 1 % cream  Self Yes No   Sig: Apply 0 1 application topically 2 (two) times a day   warfarin (COUMADIN) 2 mg tablet   No No   Sig: Take 1 tablet (2 mg total) by mouth every other day Monday, Wednesday, Friday, sunday   warfarin (COUMADIN) 5 mg tablet   No No   Sig: Take 1 tablet (5 mg total) by mouth every other day Tuesday, Thursday, saturday      Facility-Administered Medications: None       Past Medical History:   Diagnosis Date    Atrial fibrillation ([place] [address] )     Bladder stone     C  difficile colitis     Cellulitis     CHF (congestive heart failure) ([place] [address] )     Depression with anxiety     Disease of thyroid gland     Diverticulitis     Enterocolitis     History of abnormal cervical Pap smear     Kidney stone     Lymphedema     Menopause     Age 53    Morbid obesity with BMI of 70 and over, adult ([place] [address] )     MRSA (methicillin resistant Staphylococcus aureus)     abdominal wound    Osteoarthritis     Phlebitis     left lower leg    Spondylolysis        Past Surgical History:   Procedure Laterality Date    APPENDECTOMY      CARPAL TUNNEL RELEASE      CHOLECYSTECTOMY      CYSTOSCOPY      stent    FOOT SURGERY  1982    bone spur    KNEE SURGERY      WISDOM TOOTH EXTRACTION         Family History   Problem Relation Age of Onset    Heart failure Mother     Heart disease Mother     Lymphoma Mother     Kidney disease Father     Heart disease Father     Heart failure Father     Diabetes type II Father     Diabetes type II Sister     Diabetes type II Brother     Uterine cancer Paternal Aunt     Breast cancer Neg Hx     Colon cancer Neg Hx     Ovarian cancer Neg Hx      I have reviewed and agree with the history as documented      E-Cigarette/Vaping    E-Cigarette Use Never User      E-Cigarette/Vaping Substances    Nicotine No     THC No     CBD No     Flavoring No     Other No     Unknown No      Social History     Tobacco Use    Smoking status: Former Smoker    Smokeless tobacco: Never Used    Tobacco comment: 5 packs/day until 6 (age 16-19) - As per [employer]    Vaping Use    Vaping Use: Never used   Substance Use Topics    Alcohol use: Not Currently    Drug use: Not Currently     Comment: As a teen [facility]        Review of Systems   Constitutional: Negative for fever  HENT: Negative for sore throat  Respiratory: Negative for cough and shortness of breath  Cardiovascular: Positive for chest pain  Gastrointestinal: Negative for abdominal pain  Genitourinary: Negative for dysuria  Musculoskeletal: Negative for back pain  Skin: Negative for rash  Neurological: Negative for light-headedness  Psychiatric/Behavioral: Negative for agitation  All other systems reviewed and are negative  Physical Exam  Physical Exam  Vitals reviewed  Constitutional:       General: She is not in acute distress  Appearance: She is well-developed  She is obese  HENT:      Head: Normocephalic  Eyes:      Pupils: Pupils are equal, round, and reactive to light  Cardiovascular:      Rate and Rhythm: Normal rate and regular rhythm  Heart sounds: Normal heart sounds  Pulmonary:      Effort: Pulmonary effort is normal       Breath sounds: Normal breath sounds  Comments: Lungs are clear bilaterally, no increased work of breathing  Abdominal:      General: Bowel sounds are normal  There is no distension  Palpations: Abdomen is soft  Tenderness: There is no abdominal tenderness  There is no guarding  Musculoskeletal:         General: No tenderness or deformity  Normal range of motion  Cervical back: Normal range of motion and neck supple  Skin:     General: Skin is warm and dry  Capillary Refill: Capillary refill takes less than 2 seconds  Neurological:      Mental Status: She is alert and oriented to person, place, and time  Cranial Nerves: No cranial nerve deficit  Sensory: No sensory deficit  Psychiatric:         Behavior: Behavior normal          Thought Content:  Thought content normal          Judgment: Judgment normal  Vital Signs  ED Triage Vitals [03/27/22 1211]   Temperature Pulse Respirations Blood Pressure SpO2   97 9 °F (36 6 °C) 63 17 135/65 100 %      Temp Source Heart Rate Source Patient Position - Orthostatic VS BP Location FiO2 (%)   Tympanic Monitor Lying Left arm --      Pain Score       8           Vitals:    03/27/22 1211 03/27/22 1324   BP: 135/65 128/63   Pulse: 63 62   Patient Position - Orthostatic VS: Lying          Visual Acuity      ED Medications  Medications   sodium chloride (PF) 0 9 % injection 3 mL (has no administration in time range)   morphine (PF) 4 mg/mL injection 4 mg (4 mg Intravenous Not Given 3/27/22 1222)   aspirin tablet 325 mg (325 mg Oral Given 3/27/22 1222)   aluminum-magnesium hydroxide-simethicone (MYLANTA) oral suspension 30 mL (30 mL Oral Given 3/27/22 1308)   sucralfate (CARAFATE) tablet 1 g (1 g Oral Given 3/27/22 1322)   famotidine (PEPCID) injection 20 mg (20 mg Intravenous Given 3/27/22 1308)       Diagnostic Studies  Results Reviewed     Procedure Component Value Units Date/Time    HS Troponin I 2hr [[id number]]  (Normal) Collected: 03/27/22 1431    Lab Status: Final result Specimen: Blood from Arm, Right Updated: 03/27/22 1504     hs TnI 2hr 5 ng/L      Delta 2hr hsTnI 0 ng/L     HS Troponin I 4hr [[id number]]     Lab Status: No result Specimen: Blood     HS Troponin 0hr (reflex protocol) [[id number]]  (Normal) Collected: 03/27/22 1221    Lab Status: Final result Specimen: Blood from Arm, Right Updated: 03/27/22 1250     hs TnI 0hr 5 ng/L     B-Type Natriuretic Peptide(BNP) CA, GH, EA Campuses Only [[id number]]  (Abnormal) Collected: 03/27/22 1221    Lab Status: Final result Specimen: Blood from Arm, Right Updated: 03/27/22 1249      pg/mL     APTT [[id number]]  (Abnormal) Collected: 03/27/22 1221    Lab Status: Final result Specimen: Blood from Arm, Right Updated: 03/27/22 1244     PTT 53 seconds     Protime-INR [[id number]]  (Abnormal) Collected: 03/27/22 1221    Lab Status: Final result Specimen: Blood from Arm, Right Updated: 03/27/22 1244     Protime 23 9 seconds      INR 2 20    D-dimer, quantitative [[id number]]  (Normal) Collected: 03/27/22 1221    Lab Status: Final result Specimen: Blood from Arm, Right Updated: 03/27/22 1244     D-Dimer, Quant <0 27 ug/ml FEU     Narrative: In the evaluation for possible pulmonary embolism, in the appropriate (Well's Score of 4 or less) patient, the age adjusted d-dimer cutoff for this patient can be calculated as:    Age x 0 01 (in ug/mL) for Age-adjusted D-dimer exclusion threshold for a patient over 50 years      Basic metabolic panel [[id number]]  (Abnormal) Collected: 03/27/22 1221    Lab Status: Final result Specimen: Blood from Arm, Right Updated: 03/27/22 1244     Sodium 137 mmol/L      Potassium 4 3 mmol/L      Chloride 98 mmol/L      CO2 33 mmol/L      ANION GAP 6 mmol/L      BUN 24 mg/dL      Creatinine 0 99 mg/dL      Glucose 115 mg/dL      Calcium 9 0 mg/dL      eGFR 57 ml/min/1 73sq m     Narrative:      [facility] guidelines for Chronic Kidney Disease (CKD):     Stage 1 with normal or high GFR (GFR > 90 mL/min/1 73 square meters)    Stage 2 Mild CKD (GFR = 60-89 mL/min/1 73 square meters)    Stage 3A Moderate CKD (GFR = 45-59 mL/min/1 73 square meters)    Stage 3B Moderate CKD (GFR = 30-44 mL/min/1 73 square meters)    Stage 4 Severe CKD (GFR = 15-29 mL/min/1 73 square meters)    Stage 5 End Stage CKD (GFR <15 mL/min/1 73 square meters)  Note: GFR calculation is accurate only with a steady state creatinine    CBC and differential [[id number]]  (Abnormal) Collected: 03/27/22 1221    Lab Status: Final result Specimen: Blood from Arm, Right Updated: 03/27/22 1230     WBC 8 40 Thousand/uL      RBC 4 63 Million/uL      Hemoglobin 11 5 g/dL      Hematocrit 40 1 %      MCV 87 fL      MCH 24 8 pg      MCHC 28 7 g/dL      RDW 16 7 %      MPV 8 3 fL      Platelets 643 Thousands/uL      nRBC 0 /100 WBCs Neutrophils Relative 75 %      Immat GRANS % 0 %      Lymphocytes Relative 15 %      Monocytes Relative 6 %      Eosinophils Relative 3 %      Basophils Relative 1 %      Neutrophils Absolute 6 27 Thousands/µL      Immature Grans Absolute 0 02 Thousand/uL      Lymphocytes Absolute 1 29 Thousands/µL      Monocytes Absolute 0 51 Thousand/µL      Eosinophils Absolute 0 25 Thousand/µL      Basophils Absolute 0 06 Thousands/µL                  X-ray chest 1 view portable   Final Result by Tevin Martinez MD (03/27 3389)      No acute cardiopulmonary disease  Workstation performed: [id number]                    Procedures  ECG 12 Lead Documentation Only    Date/Time: 3/27/2022 4:25 PM  Performed by: Alejandro Orellana MD  Authorized by: Alejandro Orellana MD     ECG reviewed by me, the ED Provider: yes    Patient location:  ED  Previous ECG:     Previous ECG:  Unavailable    Comparison to cardiac monitor: Yes    Interpretation:     Interpretation: non-specific    Rate:     ECG rate assessment: normal    Rhythm:     Rhythm: atrial fibrillation    Ectopy:     Ectopy: none    QRS:     QRS axis:  Normal    QRS intervals:  Normal  Conduction:     Conduction: normal    ST segments:     ST segments:  Normal  T waves:     T waves: normal               ED Course  ED Course as of 03/27/22 1629   Sun Mar 27, 2022   1252 POCT INR(!): 2 20  Therapeutic               HEART Risk Score      Most Recent Value   Heart Score Risk Calculator    History 0 Filed at: 03/27/2022 1508   ECG 1 Filed at: 03/27/2022 1508   Age 2 Filed at: 03/27/2022 1508   Risk Factors 2 Filed at: 03/27/2022 1508   Troponin 0 Filed at: 03/27/2022 1508   HEART Score 5 Filed at: 03/27/2022 1508                        SBIRT 22yo+      Most Recent Value   SBIRT (23 yo +)    In order to provide better care to our patients, we are screening all of our patients for alcohol and drug use  Would it be okay to ask you these screening questions? Unable to answer at this time Filed at: 03/27/2022 1213                    [facility]  Number of Diagnoses or Management Options  Chest pain  Diagnosis management comments: Patient is a 73-year-old female who presents for evaluation of chest pain  Patient appears comfortable, normal vital signs  Patient with negative ACS workup, troponin negative x2  D-dimer negative  Chest x-ray unremarkable though rotated with poor inspiratory effort  Patient advised on cardiology follow-up and she is following up with GI tomorrow  Disposition  Final diagnoses:   Chest pain     Time reflects when diagnosis was documented in both MDM as applicable and the Disposition within this note     Time User Action Codes Description Comment    3/27/2022  3:09 PM Marylene Divers Add [R07 9] Chest pain       ED Disposition     ED Disposition Condition Date/Time Comment    Discharge Stable Sun Mar 27, 2022  3:09 PM Elvia Rodriguez discharge to home/self care  Follow-up Information     Follow up With Specialties Details Why Contact Info Additional [address] Emergency Department Emergency Medicine  If symptoms worsen [address]  [facility] [address]  [facility] Emergency Department, [address] [place] [address]    Jennifer Whalen MD Cardiology Schedule an appointment as soon as possible for a visit   [address]  [phone]             Patient's Medications   Discharge Prescriptions    No medications on file           PDMP Review     None          ED Provider  Electronically Signed by           Kamila De La Rosa MD  03/27/22 5470

## 2022-03-28 ENCOUNTER — OFFICE VISIT (OUTPATIENT)
Dept: GASTROENTEROLOGY | Facility: CLINIC | Age: 72
End: 2022-03-28
Payer: MEDICARE

## 2022-03-28 ENCOUNTER — TELEPHONE (OUTPATIENT)
Dept: OTHER | Facility: OTHER | Age: 72
End: 2022-03-28

## 2022-03-28 VITALS — OXYGEN SATURATION: 98 % | TEMPERATURE: 98.5 F | HEART RATE: 65 BPM

## 2022-03-28 DIAGNOSIS — R13.10 ODYNOPHAGIA: ICD-10-CM

## 2022-03-28 DIAGNOSIS — R10.13 EPIGASTRIC ABDOMINAL PAIN: Primary | ICD-10-CM

## 2022-03-28 DIAGNOSIS — R11.0 NAUSEA: ICD-10-CM

## 2022-03-28 DIAGNOSIS — K21.9 GASTROESOPHAGEAL REFLUX DISEASE, UNSPECIFIED WHETHER ESOPHAGITIS PRESENT: ICD-10-CM

## 2022-03-28 LAB
ATRIAL RATE: 45 BPM
ATRIAL RATE: 49 BPM
ATRIAL RATE: 53 BPM
QRS AXIS: -11 DEGREES
QRS AXIS: -5 DEGREES
QRS AXIS: 3 DEGREES
QRSD INTERVAL: 68 MS
QRSD INTERVAL: 70 MS
QRSD INTERVAL: 76 MS
QT INTERVAL: 392 MS
QT INTERVAL: 400 MS
QT INTERVAL: 404 MS
QTC INTERVAL: 404 MS
QTC INTERVAL: 412 MS
QTC INTERVAL: 442 MS
T WAVE AXIS: 100 DEGREES
T WAVE AXIS: 20 DEGREES
T WAVE AXIS: 57 DEGREES
VENTRICULAR RATE: 64 BPM
VENTRICULAR RATE: 64 BPM
VENTRICULAR RATE: 72 BPM

## 2022-03-28 PROCEDURE — 93010 ELECTROCARDIOGRAM REPORT: CPT | Performed by: INTERNAL MEDICINE

## 2022-03-28 PROCEDURE — 99214 OFFICE O/P EST MOD 30 MIN: CPT | Performed by: NURSE PRACTITIONER

## 2022-03-28 RX ORDER — OMEPRAZOLE 40 MG/1
40 CAPSULE, DELAYED RELEASE ORAL DAILY
Qty: 30 CAPSULE | Refills: 11 | Status: SHIPPED | OUTPATIENT
Start: 2022-03-28 | End: 2022-05-04

## 2022-03-28 NOTE — TELEPHONE ENCOUNTER
Patient called stating she was scheduled for an EGD for 4/4/22 at [place]; I do not see this scheduled  Patient needs to call an ambulance to transport her to and from her EGD and needs a definite date and set time for this test in order for her to make her ambulance reservation    Please call patient and assist

## 2022-03-28 NOTE — PROGRESS NOTES
Blaise Bey [facility] - Outpatient Follow-up Note  Liyah Bello 80 y o  female MRN: [id number]  Encounter: [id number]          ASSESSMENT AND PLAN:      1  Epigastric abdominal pain  2  Gastroesophageal reflux disease, unspecified whether esophagitis present  3  Odynophagia  4  Nausea    Patient was seen in the emergency department yesterday for chest and epigastric pain  She underwent chest x-ray, EKG and lab work that was unremarkable  She does report that the pain is worse when she eats  She does also report acid reflux, odynophagia and nausea but no vomiting  She does report a remote history of GERD in which she took omeprazole for short time with good relief  Will start patient on omeprazole 40 mg at this time  Would also recommend EGD to evaluate for etiology such as but not limited to EOE, stricture, esophagitis, gastritis, H pylori or or ulcer disease  Patient will need to be cleared to be off Coumadin for 5 days  Process, risks and benefits discussed with patient, she is agreeable  - omeprazole (PriLOSEC) 40 MG capsule; Take 1 capsule (40 mg total) by mouth daily  Dispense: 30 capsule; Refill: 11  - EGD; Future    Will see patient back after procedure     ______________________________________________________________________    SUBJECTIVE:  Liyah Bello is a 80year old female with a past medical history of AFib on Coumadin, CKD stage 3, hyperlipidemia, and gout that presents today via litter due to morbid obesity and bed-bound status for a follow-up after an emergency department visit yesterday for chest and epigastric pain  She underwent chest x-ray that revealed no acute cardiopulmonary disease  Her EKG revealed no significant change  Troponins were normal   D-dimer was normal   She states that the pain is constant but worse when she eats  She reports some acid reflux and nausea but no vomiting  She does also report odynophagia    She does report a remote history of acid reflux in which she did take omeprazole for a short period of time with good relief  She is currently not taking any medication for acid reflux  She had been seen in the hospital in February for significant diarrhea in the setting of persistent C diff  She was treated with resolution of symptoms  She denies any constipation, diarrhea, black or bloody stools  She had previously been recommended for colonoscopy but has preferred to wait on this procedure due to the difficulty with prep  REVIEW OF SYSTEMS:  Review of Systems   Constitutional: Negative for fever  HENT: Positive for trouble swallowing (odynophagia)  Cardiovascular: Positive for chest pain  Gastrointestinal: Positive for abdominal pain ( epigastric) and nausea  Negative for abdominal distention, anal bleeding, blood in stool, constipation, diarrhea, rectal pain and vomiting  Musculoskeletal: Gait problem: Bed-bound           Historical Information   Past Medical History:   Diagnosis Date    Atrial fibrillation ([place] [address] )     Bladder stone     C  difficile colitis     Cellulitis     CHF (congestive heart failure) ([place] [address] )     Depression with anxiety     Disease of thyroid gland     Diverticulitis     Enterocolitis     History of abnormal cervical Pap smear     Kidney stone     Lymphedema     Menopause     Age 53    Morbid obesity with BMI of 70 and over, adult ([place] [address] )     MRSA (methicillin resistant Staphylococcus aureus)     abdominal wound    Osteoarthritis     Phlebitis     left lower leg    Spondylolysis      Past Surgical History:   Procedure Laterality Date    APPENDECTOMY      CARPAL TUNNEL RELEASE      CHOLECYSTECTOMY      CYSTOSCOPY      stent    FOOT SURGERY  1982    bone spur    KNEE SURGERY      WISDOM TOOTH EXTRACTION       Social History   Social History     Substance and Sexual Activity   Alcohol Use Not Currently     Social History     Substance and Sexual Activity   Drug Use Not Currently Comment: As a teen - As per [employer]      Social History     Tobacco Use   Smoking Status Former Smoker   Smokeless Tobacco Never Used   Tobacco Comment    5 packs/day until 6 (age 17-19) - As per [employer]      Family History   Problem Relation Age of Onset    Heart failure Mother     Heart disease Mother     Lymphoma Mother     Kidney disease Father     Heart disease Father     Heart failure Father     Diabetes type II Father     Diabetes type II Sister     Diabetes type II Brother     Uterine cancer Paternal Aunt     Breast cancer Neg Hx     Colon cancer Neg Hx     Ovarian cancer Neg Hx        Meds/Allergies       Current Outpatient Medications:     acetaminophen (TYLENOL) 650 mg CR tablet    allopurinol (ZYLOPRIM) 100 mg tablet    DULoxetine (CYMBALTA) 20 mg capsule    furosemide (LASIX) 40 mg tablet    levothyroxine 125 mcg tablet    nystatin (MYCOSTATIN) powder    omeprazole (PriLOSEC) 40 MG capsule    triamcinolone (KENALOG) 0 1 % cream    warfarin (COUMADIN) 2 mg tablet    warfarin (COUMADIN) 5 mg tablet  No current facility-administered medications for this visit  Allergies   Allergen Reactions    Augmentin Es-600  [Amoxicillin-Pot Clavulanate]     Penicillins Other (See Comments)     Tolerates cefepime (11/18/21)    Sulfa Antibiotics Hives    Vitamin C [Ascorbate - Food Allergy]     Latex Rash and Edema           Objective     Pulse 65, temperature 98 5 °F (36 9 °C), SpO2 98 %  There is no height or weight on file to calculate BMI  PHYSICAL EXAM:      General Appearance:   Alert, cooperative, no distress   HEENT:   Normocephalic, atraumatic, anicteric  Neck:  Supple, symmetrical, trachea midline   Lungs:   Clear to auscultation bilaterally; no rales, rhonchi or wheezing; respirations unlabored    Heart[de-identified]   Regular rate and rhythm; no murmur, rub, or gallop     Abdomen:   Soft, non-tender, non-distended; normal bowel sounds; no masses, no organomegaly Genitalia:   Deferred    Rectal:   Deferred    Extremities:  No cyanosis, clubbing or edema    Skin:  No jaundice, rashes, or lesions             Lab Results:   No visits with results within 1 Day(s) from this visit     Latest known visit with results is:   Admission on 03/27/2022, Discharged on 03/27/2022   Component Date Value    WBC 03/27/2022 8 40     RBC 03/27/2022 4 63     Hemoglobin 03/27/2022 11 5     Hematocrit 03/27/2022 40 1     MCV 03/27/2022 87     MCH 03/27/2022 24 8*    MCHC 03/27/2022 28 7*    RDW 03/27/2022 16 7*    MPV 03/27/2022 8 3*    Platelets 16/26/2853 444*    nRBC 03/27/2022 0     Neutrophils Relative 03/27/2022 75     Immat GRANS % 03/27/2022 0     Lymphocytes Relative 03/27/2022 15     Monocytes Relative 03/27/2022 6     Eosinophils Relative 03/27/2022 3     Basophils Relative 03/27/2022 1     Neutrophils Absolute 03/27/2022 6 27     Immature Grans Absolute 03/27/2022 0 02     Lymphocytes Absolute 03/27/2022 1 29     Monocytes Absolute 03/27/2022 0 51     Eosinophils Absolute 03/27/2022 0 25     Basophils Absolute 03/27/2022 0 06     Sodium 03/27/2022 137     Potassium 03/27/2022 4 3     Chloride 03/27/2022 98     CO2 03/27/2022 33*    ANION GAP 03/27/2022 6     BUN 03/27/2022 24     Creatinine 03/27/2022 0 99     Glucose 03/27/2022 115     Calcium 03/27/2022 9 0     eGFR 03/27/2022 57     hs TnI 0hr 03/27/2022 5     BNP 03/27/2022 166*    PTT 03/27/2022 53*    Protime 03/27/2022 23 9*    INR 03/27/2022 2 20*    D-Dimer, Quant 03/27/2022 <0 27     hs TnI 2hr 03/27/2022 5     Delta 2hr hsTnI 03/27/2022 0     Ventricular Rate 03/27/2022 72     Atrial Rate 03/27/2022 53     QRSD Interval 03/27/2022 76     QT Interval 03/27/2022 404     QTC Interval 03/27/2022 442     QRS Axis 03/27/2022 3     T Wave [place] 03/27/2022 100     Ventricular Rate 03/27/2022 64     Atrial Rate 03/27/2022 49     QRSD Interval 03/27/2022 70     QT Interval 03/27/2022 400     QTC Interval 03/27/2022 412     QRS Axis 03/27/2022 -5     T Wave Axis 03/27/2022 20     Ventricular Rate 03/27/2022 64     Atrial Rate 03/27/2022 45     QRSD Interval 03/27/2022 68     QT Interval 03/27/2022 392     QTC Interval 03/27/2022 404     QRS Axis 03/27/2022 -11     T Wave Axis 03/27/2022 57          Radiology Results:   X-ray chest 1 view portable    Result Date: 3/27/2022  Narrative: CHEST INDICATION:   chest pain  COMPARISON:  Chest radiograph July 11, 2017  EXAM PERFORMED/VIEWS:  XR CHEST PORTABLE Images:  1 FINDINGS: Patient is rotated towards the right  Heart shadow is enlarged but unchanged from prior exam  Hypoinflated lungs are clear  No pneumothorax or pleural effusion  Osseous structures appear within normal limits for patient age  Impression: No acute cardiopulmonary disease   Workstation performed: [id number]

## 2022-03-28 NOTE — PATIENT INSTRUCTIONS
Scheduled date of EGD(as of today):4/4/2022  Physician performing EGD: Dr Jesi Fuentes  Location of EGD: [place]   Instructions reviewed with patient by:  Ashley Cano   Clearances: Coumadin  Patient expressed understanding

## 2022-03-30 ENCOUNTER — TELEPHONE (OUTPATIENT)
Dept: GASTROENTEROLOGY | Facility: CLINIC | Age: 72
End: 2022-03-30

## 2022-03-30 NOTE — TELEPHONE ENCOUNTER
Called Dr Beatriz Glynn and got the Coumadin clearance for the patient, Kassandra will fax it over I did call the patient  Patient expressed understanding

## 2022-04-04 ENCOUNTER — ANESTHESIA (OUTPATIENT)
Dept: GASTROENTEROLOGY | Facility: HOSPITAL | Age: 72
End: 2022-04-04

## 2022-04-04 ENCOUNTER — ANESTHESIA EVENT (OUTPATIENT)
Dept: GASTROENTEROLOGY | Facility: HOSPITAL | Age: 72
End: 2022-04-04

## 2022-04-04 ENCOUNTER — HOSPITAL ENCOUNTER (OUTPATIENT)
Dept: GASTROENTEROLOGY | Facility: HOSPITAL | Age: 72
Setting detail: OUTPATIENT SURGERY
Discharge: HOME | End: 2022-04-04
Admitting: STUDENT IN AN ORGANIZED HEALTH CARE EDUCATION/TRAINING PROGRAM
Payer: MEDICARE

## 2022-04-04 VITALS
DIASTOLIC BLOOD PRESSURE: 64 MMHG | OXYGEN SATURATION: 96 % | TEMPERATURE: 97.2 F | SYSTOLIC BLOOD PRESSURE: 110 MMHG | WEIGHT: 293 LBS | HEART RATE: 82 BPM | RESPIRATION RATE: 20 BRPM | BODY MASS INDEX: 50.02 KG/M2 | HEIGHT: 64 IN

## 2022-04-04 DIAGNOSIS — R10.13 EPIGASTRIC ABDOMINAL PAIN: ICD-10-CM

## 2022-04-04 DIAGNOSIS — K21.9 GASTROESOPHAGEAL REFLUX DISEASE, UNSPECIFIED WHETHER ESOPHAGITIS PRESENT: ICD-10-CM

## 2022-04-04 PROBLEM — E66.813 CLASS 3 SEVERE OBESITY DUE TO EXCESS CALORIES WITH BODY MASS INDEX (BMI) GREATER THAN OR EQUAL TO 70 IN ADULT (HCC): Status: ACTIVE | Noted: 2020-10-03

## 2022-04-04 PROCEDURE — 43239 EGD BIOPSY SINGLE/MULTIPLE: CPT | Performed by: STUDENT IN AN ORGANIZED HEALTH CARE EDUCATION/TRAINING PROGRAM

## 2022-04-04 PROCEDURE — 88305 TISSUE EXAM BY PATHOLOGIST: CPT | Performed by: PATHOLOGY

## 2022-04-04 RX ORDER — KETAMINE HCL IN NACL, ISO-OSM 100MG/10ML
SYRINGE (ML) INJECTION AS NEEDED
Status: DISCONTINUED | OUTPATIENT
Start: 2022-04-04 | End: 2022-04-04

## 2022-04-04 RX ORDER — LIDOCAINE HYDROCHLORIDE 20 MG/ML
INJECTION, SOLUTION EPIDURAL; INFILTRATION; INTRACAUDAL; PERINEURAL AS NEEDED
Status: DISCONTINUED | OUTPATIENT
Start: 2022-04-04 | End: 2022-04-04

## 2022-04-04 RX ORDER — SODIUM CHLORIDE, SODIUM LACTATE, POTASSIUM CHLORIDE, CALCIUM CHLORIDE 600; 310; 30; 20 MG/100ML; MG/100ML; MG/100ML; MG/100ML
50 INJECTION, SOLUTION INTRAVENOUS CONTINUOUS
Status: DISCONTINUED | OUTPATIENT
Start: 2022-04-04 | End: 2022-04-04

## 2022-04-04 RX ORDER — GLYCOPYRROLATE 0.2 MG/ML
INJECTION INTRAMUSCULAR; INTRAVENOUS AS NEEDED
Status: DISCONTINUED | OUTPATIENT
Start: 2022-04-04 | End: 2022-04-04

## 2022-04-04 RX ORDER — PROPOFOL 10 MG/ML
INJECTION, EMULSION INTRAVENOUS AS NEEDED
Status: DISCONTINUED | OUTPATIENT
Start: 2022-04-04 | End: 2022-04-04

## 2022-04-04 RX ORDER — LIDOCAINE HYDROCHLORIDE 10 MG/ML
0.5 INJECTION, SOLUTION EPIDURAL; INFILTRATION; INTRACAUDAL; PERINEURAL ONCE AS NEEDED
Status: DISCONTINUED | OUTPATIENT
Start: 2022-04-04 | End: 2022-04-08 | Stop reason: HOSPADM

## 2022-04-04 RX ADMIN — LIDOCAINE HYDROCHLORIDE 100 MG: 20 INJECTION, SOLUTION EPIDURAL; INFILTRATION; INTRACAUDAL; PERINEURAL at 11:32

## 2022-04-04 RX ADMIN — Medication 20 MG: at 11:36

## 2022-04-04 RX ADMIN — PROPOFOL 50 MG: 10 INJECTION, EMULSION INTRAVENOUS at 11:32

## 2022-04-04 RX ADMIN — SODIUM CHLORIDE, SODIUM LACTATE, POTASSIUM CHLORIDE, AND CALCIUM CHLORIDE: .6; .31; .03; .02 INJECTION, SOLUTION INTRAVENOUS at 11:20

## 2022-04-04 RX ADMIN — GLYCOPYRROLATE 0.2 MG: 0.4 INJECTION INTRAMUSCULAR; INTRAVENOUS at 11:30

## 2022-04-04 RX ADMIN — PROPOFOL 50 MG: 10 INJECTION, EMULSION INTRAVENOUS at 11:40

## 2022-04-04 RX ADMIN — Medication 30 MG: at 11:32

## 2022-04-04 NOTE — ANESTHESIA POSTPROCEDURE EVALUATION
Post-Op Assessment Note    CV Status:  Stable  Pain Score: 0    Pain management: adequate     Mental Status:  Arousable   Hydration Status:  Stable   PONV Controlled:  None   Airway Patency:  Patent      Post Op Vitals Reviewed: Yes      Staff: CRNA         No complications documented      BP   130/67   Temp      Pulse  83   Resp   12   SpO2   100

## 2022-04-04 NOTE — ANESTHESIA PREPROCEDURE EVALUATION
Procedure:  EGD    Relevant Problems   CARDIO   (+) Chronic atrial fibrillation (HCC)   (+) Chronic diastolic congestive heart failure (HCC)   (+) Hyperlipidemia      ENDO   (+) Other specified hypothyroidism      GI/HEPATIC   (+) Gastroesophageal reflux disease without esophagitis      /RENAL   (+) Stage 3a chronic kidney disease (HCC)      HEMATOLOGY   (+) Anemia      MUSCULOSKELETAL   (+) Idiopathic chronic 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      NEURO/PSYCH   (+) Chronic pain disorder   (+) Depression with anxiety      Other   (+) Ambulatory dysfunction   (+) Class 3 severe obesity due to excess calories with body mass index (BMI) greater than or equal to 70 in adult [facility])        Physical Exam    Airway    Mallampati score: III  TM Distance: >3 FB  Neck ROM: full     Dental       Cardiovascular      Pulmonary      Other Findings        Anesthesia Plan  ASA Score- 4     Anesthesia Type- IV sedation with anesthesia with ASA Monitors  Additional Monitors:   Airway Plan:     Comment: I discussed the risks and benefits of IV sedation anesthesia including the possibility of the need to convert to general anesthesia and the potential risk of awareness  Discussed increased risk of complications from airway compromise in setting of class III obesity  Plan Factors-Exercise tolerance (METS): <4 METS  Exercise comment: Bedbound  Chart reviewed  Patient is not a current smoker  Patient did not smoke on day of surgery  Induction- intravenous  Postoperative Plan-     Informed Consent- Anesthetic plan and risks discussed with patient

## 2022-04-04 NOTE — H&P
History and Physical -  [facility]  Dian Hunter 80 y o  female MRN: [id number]                  HPI: Dian Hunter is a 80y o  year old female who presents for epigastric pain, GERD, dysphagia      REVIEW OF SYSTEMS: Per the HPI, and otherwise unremarkable      Historical Information   Past Medical History:   Diagnosis Date    Atrial fibrillation ([place] [address] )     Bladder stone     C  difficile colitis     Cellulitis     CHF (congestive heart failure) ([place] [address] )     Depression with anxiety     Disease of thyroid gland     Diverticulitis     Enterocolitis     History of abnormal cervical Pap smear     Kidney stone     Lymphedema     Menopause     Age 53    Morbid obesity with BMI of 70 and over, adult ([place] [address] )     MRSA (methicillin resistant Staphylococcus aureus)     abdominal wound    Osteoarthritis     Phlebitis     left lower leg    Spondylolysis      Past Surgical History:   Procedure Laterality Date    APPENDECTOMY      CARPAL TUNNEL RELEASE      CHOLECYSTECTOMY      CYSTOSCOPY      stent    FOOT SURGERY  1982    bone spur    KNEE SURGERY      WISDOM TOOTH EXTRACTION       Social History   Social History     Substance and Sexual Activity   Alcohol Use Not Currently     Social History     Substance and Sexual Activity   Drug Use Not Currently    Comment: As a teen - As per [employer]      Social History     Tobacco Use   Smoking Status Former Smoker   Smokeless Tobacco Never Used   Tobacco Comment    5 packs/day until 5 (age 16-20) - As per [employer]      Family History   Problem Relation Age of Onset    Heart failure Mother     Heart disease Mother     Lymphoma Mother     Kidney disease Father     Heart disease Father     Heart failure Father     Diabetes type II Father     Diabetes type II Sister     Diabetes type II Brother     Uterine cancer Paternal Aunt     Breast cancer Neg Hx     Colon cancer Neg Hx     Ovarian cancer Neg Hx Meds/Allergies       Current Outpatient Medications:     acetaminophen (TYLENOL) 650 mg CR tablet    allopurinol (ZYLOPRIM) 100 mg tablet    DULoxetine (CYMBALTA) 20 mg capsule    furosemide (LASIX) 40 mg tablet    levothyroxine 125 mcg tablet    omeprazole (PriLOSEC) 40 MG capsule    warfarin (COUMADIN) 2 mg tablet    nystatin (MYCOSTATIN) powder    triamcinolone (KENALOG) 0 1 % cream    warfarin (COUMADIN) 5 mg tablet    Current Facility-Administered Medications:     lactated ringers infusion, 50 mL/hr, Intravenous, Continuous    lidocaine (PF) (XYLOCAINE-MPF) 1 % injection 0 5 mL, 0 5 mL, Infiltration, Once PRN    Allergies   Allergen Reactions    Augmentin Es-600  [Amoxicillin-Pot Clavulanate]     Penicillins Other (See Comments)     Tolerates cefepime (11/18/21)    Sulfa Antibiotics Hives    Vitamin C [Ascorbate - Food Allergy]     Latex Rash and Edema       Objective     /62   Pulse 65   Temp 98 °F (36 7 °C) (Temporal)   Resp 20   Ht 5' 4" (1 626 m)   Wt (!) 181 kg (400 lb)   LMP  (LMP Unknown)   SpO2 97%   BMI 68 66 kg/m²       PHYSICAL EXAM    Gen: NAD  Head: NCAT  CV: RRR  CHEST: Clear  ABD: soft, NT/ND  EXT: no edema      ASSESSMENT/PLAN:  This is a 80y o  year old female here for EGD, and she is stable and optimized for her procedure

## 2022-04-18 ENCOUNTER — APPOINTMENT (EMERGENCY)
Dept: CT IMAGING | Facility: HOSPITAL | Age: 72
DRG: 372 | End: 2022-04-18
Payer: MEDICARE

## 2022-04-18 ENCOUNTER — HOSPITAL ENCOUNTER (INPATIENT)
Facility: HOSPITAL | Age: 72
LOS: 1 days | Discharge: HOME | DRG: 372 | End: 2022-04-20
Attending: EMERGENCY MEDICINE | Admitting: INTERNAL MEDICINE
Payer: MEDICARE

## 2022-04-18 DIAGNOSIS — R10.84 GENERALIZED ABDOMINAL PAIN: ICD-10-CM

## 2022-04-18 DIAGNOSIS — A04.72 C. DIFFICILE COLITIS: Primary | ICD-10-CM

## 2022-04-18 DIAGNOSIS — L03.90 CELLULITIS: ICD-10-CM

## 2022-04-18 LAB
ALBUMIN SERPL BCP-MCNC: 3.1 G/DL (ref 3.5–5)
ALP SERPL-CCNC: 118 U/L (ref 34–104)
ALT SERPL W P-5'-P-CCNC: 12 U/L (ref 7–52)
ANION GAP SERPL CALCULATED.3IONS-SCNC: 5 MMOL/L (ref 4–13)
AST SERPL W P-5'-P-CCNC: 16 U/L (ref 13–39)
BASOPHILS # BLD AUTO: 0.05 THOUSANDS/ΜL (ref 0–0.1)
BASOPHILS NFR BLD AUTO: 1 % (ref 0–1)
BILIRUB SERPL-MCNC: 0.66 MG/DL (ref 0.2–1)
BUN SERPL-MCNC: 25 MG/DL (ref 5–25)
CALCIUM ALBUM COR SERPL-MCNC: 9.6 MG/DL (ref 8.3–10.1)
CALCIUM SERPL-MCNC: 8.9 MG/DL (ref 8.4–10.2)
CHLORIDE SERPL-SCNC: 98 MMOL/L (ref 96–108)
CO2 SERPL-SCNC: 36 MMOL/L (ref 21–32)
CREAT SERPL-MCNC: 0.94 MG/DL (ref 0.6–1.3)
EOSINOPHIL # BLD AUTO: 0.26 THOUSAND/ΜL (ref 0–0.61)
EOSINOPHIL NFR BLD AUTO: 3 % (ref 0–6)
ERYTHROCYTE [DISTWIDTH] IN BLOOD BY AUTOMATED COUNT: 16.8 % (ref 11.6–15.1)
GFR SERPL CREATININE-BSD FRML MDRD: 61 ML/MIN/1.73SQ M
GLUCOSE SERPL-MCNC: 103 MG/DL (ref 65–140)
HCT VFR BLD AUTO: 37.8 % (ref 34.8–46.1)
HGB BLD-MCNC: 10.9 G/DL (ref 11.5–15.4)
IMM GRANULOCYTES # BLD AUTO: 0.03 THOUSAND/UL (ref 0–0.2)
IMM GRANULOCYTES NFR BLD AUTO: 0 % (ref 0–2)
INR PPP: 2.24 (ref 0.84–1.19)
LACTATE SERPL-SCNC: 1.2 MMOL/L (ref 0.5–2)
LIPASE SERPL-CCNC: 30 U/L (ref 11–82)
LYMPHOCYTES # BLD AUTO: 1.37 THOUSANDS/ΜL (ref 0.6–4.47)
LYMPHOCYTES NFR BLD AUTO: 15 % (ref 14–44)
MCH RBC QN AUTO: 24.8 PG (ref 26.8–34.3)
MCHC RBC AUTO-ENTMCNC: 28.8 G/DL (ref 31.4–37.4)
MCV RBC AUTO: 86 FL (ref 82–98)
MONOCYTES # BLD AUTO: 0.51 THOUSAND/ΜL (ref 0.17–1.22)
MONOCYTES NFR BLD AUTO: 6 % (ref 4–12)
NEUTROPHILS # BLD AUTO: 6.69 THOUSANDS/ΜL (ref 1.85–7.62)
NEUTS SEG NFR BLD AUTO: 75 % (ref 43–75)
NRBC BLD AUTO-RTO: 0 /100 WBCS
PLATELET # BLD AUTO: 448 THOUSANDS/UL (ref 149–390)
PMV BLD AUTO: 8.5 FL (ref 8.9–12.7)
POTASSIUM SERPL-SCNC: 3.9 MMOL/L (ref 3.5–5.3)
PROT SERPL-MCNC: 7.9 G/DL (ref 6.4–8.4)
PROTHROMBIN TIME: 24.2 SECONDS (ref 11.6–14.5)
RBC # BLD AUTO: 4.39 MILLION/UL (ref 3.81–5.12)
SODIUM SERPL-SCNC: 139 MMOL/L (ref 135–147)
WBC # BLD AUTO: 8.91 THOUSAND/UL (ref 4.31–10.16)

## 2022-04-18 PROCEDURE — 83605 ASSAY OF LACTIC ACID: CPT | Performed by: EMERGENCY MEDICINE

## 2022-04-18 PROCEDURE — G1004 CDSM NDSC: HCPCS

## 2022-04-18 PROCEDURE — 99285 EMERGENCY DEPT VISIT HI MDM: CPT

## 2022-04-18 PROCEDURE — 85610 PROTHROMBIN TIME: CPT | Performed by: INTERNAL MEDICINE

## 2022-04-18 PROCEDURE — 99220 PR INITIAL OBSERVATION CARE/DAY 70 MINUTES: CPT | Performed by: INTERNAL MEDICINE

## 2022-04-18 PROCEDURE — 36415 COLL VENOUS BLD VENIPUNCTURE: CPT | Performed by: EMERGENCY MEDICINE

## 2022-04-18 PROCEDURE — 70450 CT HEAD/BRAIN W/O DYE: CPT

## 2022-04-18 PROCEDURE — 99285 EMERGENCY DEPT VISIT HI MDM: CPT | Performed by: EMERGENCY MEDICINE

## 2022-04-18 PROCEDURE — 85025 COMPLETE CBC W/AUTO DIFF WBC: CPT | Performed by: EMERGENCY MEDICINE

## 2022-04-18 PROCEDURE — 87040 BLOOD CULTURE FOR BACTERIA: CPT | Performed by: EMERGENCY MEDICINE

## 2022-04-18 PROCEDURE — 83690 ASSAY OF LIPASE: CPT | Performed by: EMERGENCY MEDICINE

## 2022-04-18 PROCEDURE — 96360 HYDRATION IV INFUSION INIT: CPT

## 2022-04-18 PROCEDURE — 80053 COMPREHEN METABOLIC PANEL: CPT | Performed by: EMERGENCY MEDICINE

## 2022-04-18 RX ORDER — ACETAMINOPHEN 325 MG/1
650 TABLET ORAL EVERY 4 HOURS PRN
Status: DISCONTINUED | OUTPATIENT
Start: 2022-04-18 | End: 2022-04-20 | Stop reason: HOSPADM

## 2022-04-18 RX ORDER — DULOXETIN HYDROCHLORIDE 20 MG/1
20 CAPSULE, DELAYED RELEASE ORAL DAILY
Status: DISCONTINUED | OUTPATIENT
Start: 2022-04-19 | End: 2022-04-20 | Stop reason: HOSPADM

## 2022-04-18 RX ORDER — WARFARIN SODIUM 2 MG/1
2 TABLET ORAL
Status: DISCONTINUED | OUTPATIENT
Start: 2022-04-18 | End: 2022-04-18

## 2022-04-18 RX ORDER — WARFARIN SODIUM 5 MG/1
2.5 TABLET ORAL
Status: DISCONTINUED | OUTPATIENT
Start: 2022-04-18 | End: 2022-04-18

## 2022-04-18 RX ORDER — NYSTATIN 100000 [USP'U]/G
POWDER TOPICAL 2 TIMES DAILY
Status: DISCONTINUED | OUTPATIENT
Start: 2022-04-18 | End: 2022-04-19

## 2022-04-18 RX ORDER — FUROSEMIDE 40 MG/1
40 TABLET ORAL DAILY
Status: DISCONTINUED | OUTPATIENT
Start: 2022-04-19 | End: 2022-04-18

## 2022-04-18 RX ORDER — WARFARIN SODIUM 2.5 MG/1
2.5 TABLET ORAL
Status: DISCONTINUED | OUTPATIENT
Start: 2022-04-18 | End: 2022-04-20 | Stop reason: HOSPADM

## 2022-04-18 RX ORDER — ONDANSETRON 2 MG/ML
4 INJECTION INTRAMUSCULAR; INTRAVENOUS EVERY 6 HOURS PRN
Status: DISCONTINUED | OUTPATIENT
Start: 2022-04-18 | End: 2022-04-20 | Stop reason: HOSPADM

## 2022-04-18 RX ORDER — WARFARIN SODIUM 4 MG/1
4 TABLET ORAL
Status: DISCONTINUED | OUTPATIENT
Start: 2022-04-19 | End: 2022-04-18

## 2022-04-18 RX ORDER — ALLOPURINOL 100 MG/1
100 TABLET ORAL DAILY
Status: DISCONTINUED | OUTPATIENT
Start: 2022-04-19 | End: 2022-04-20 | Stop reason: HOSPADM

## 2022-04-18 RX ORDER — PANTOPRAZOLE SODIUM 40 MG/1
40 TABLET, DELAYED RELEASE ORAL
Status: DISCONTINUED | OUTPATIENT
Start: 2022-04-19 | End: 2022-04-20 | Stop reason: HOSPADM

## 2022-04-18 RX ORDER — SODIUM CHLORIDE 9 MG/ML
125 INJECTION, SOLUTION INTRAVENOUS CONTINUOUS
Status: DISCONTINUED | OUTPATIENT
Start: 2022-04-18 | End: 2022-04-18

## 2022-04-18 RX ORDER — WARFARIN SODIUM 2 MG/1
4 TABLET ORAL
Status: DISCONTINUED | OUTPATIENT
Start: 2022-04-19 | End: 2022-04-20 | Stop reason: HOSPADM

## 2022-04-18 RX ADMIN — Medication 125 MG: at 17:52

## 2022-04-18 RX ADMIN — SODIUM CHLORIDE 125 ML/HR: 0.9 INJECTION, SOLUTION INTRAVENOUS at 15:36

## 2022-04-18 RX ADMIN — WARFARIN SODIUM 2.5 MG: 5 TABLET ORAL at 20:12

## 2022-04-18 NOTE — ASSESSMENT & PLAN NOTE
· Patient presented to the ED with complaints of severe persistent diarrhea  · History of c-diff infection in January  · Completed a 10 day course of oral Vancomycin  · C diff positive again on 02/18/2022  · Will continue supportive care   · Advance diet as tolerated  · Oral vancomycin 125 mg q6 hours while inpatient with extended taper course as an outpatient

## 2022-04-18 NOTE — ASSESSMENT & PLAN NOTE
· Present on admission as evidenced by BMI of 69  · Carbohydrate controlled diet  · Encourage lifestyle modifications and weight loss

## 2022-04-18 NOTE — ED PROVIDER NOTES
History  Chief Complaint   Patient presents with    Abdominal Pain     lower abdominal pain onset several days ago  patient sent Stool sample for r/o cdiff per PCP however has not recieved results yet   Diarrhea     23-year-old female presents to the ER from home at the request of their primary care physician  Apparently, the patient has had C diff and was treated with oral vancomycin liquid  Patient noted that the symptoms improved however a week later they have returned along with abdominal pain as well as mild headache and nausea  The patient has been retested for C diff however the patient notes that her primary care physician has not been returning calls regarding the results of these tests, and was told that if the office does not return the call that she should just go to the hospital because the office is closing in 2 days  Patient is on Coumadin and denies trauma or fever  Prior to Admission Medications   Prescriptions Last Dose Informant Patient Reported? Taking?    DULoxetine (CYMBALTA) 20 mg capsule  Self No No   Sig: Take 1 capsule (20 mg total) by mouth daily   acetaminophen (TYLENOL) 650 mg CR tablet  Self Yes No   Sig: Take 650 mg by mouth every 8 (eight) hours   allopurinol (ZYLOPRIM) 100 mg tablet  Self No No   Sig: Take 1 tablet (100 mg total) by mouth daily   furosemide (LASIX) 40 mg tablet  Self No No   Sig: Take 1 tablet (40 mg total) by mouth daily   Patient taking differently: Take 20 mg by mouth daily as needed Prn weight gain 10 pounds    levothyroxine 125 mcg tablet  Self Yes No   Sig: Take 125 mcg by mouth daily   nystatin (MYCOSTATIN) powder  Self No No   Sig: Apply topically 2 (two) times a day   omeprazole (PriLOSEC) 40 MG capsule   No No   Sig: Take 1 capsule (40 mg total) by mouth daily   triamcinolone (KENALOG) 0 1 % cream  Self Yes No   Sig: Apply 0 1 application topically 2 (two) times a day   warfarin (COUMADIN) 2 mg tablet   No No   Sig: Take 1 tablet (2 mg total) by mouth every other day Monday, Wednesday, Friday, sunday   warfarin (COUMADIN) 5 mg tablet   No No   Sig: Take 1 tablet (5 mg total) by mouth every other day Tuesday, Thursday, saturday      Facility-Administered Medications: None       Past Medical History:   Diagnosis Date    Atrial fibrillation ([place] [address] )     Bladder stone     C  difficile colitis     Cellulitis     CHF (congestive heart failure) ([place] [address] )     Depression with anxiety     Disease of thyroid gland     Diverticulitis     Enterocolitis     History of abnormal cervical Pap smear     Kidney stone     Lymphedema     Menopause     Age 53    Morbid obesity with BMI of 70 and over, adult ([place] [address] )     MRSA (methicillin resistant Staphylococcus aureus)     abdominal wound    Osteoarthritis     Phlebitis     left lower leg    Spondylolysis        Past Surgical History:   Procedure Laterality Date    APPENDECTOMY      CARPAL TUNNEL RELEASE      CHOLECYSTECTOMY      CYSTOSCOPY      stent    FOOT SURGERY  1982    bone spur    KNEE SURGERY      WISDOM TOOTH EXTRACTION         Family History   Problem Relation Age of Onset    Heart failure Mother     Heart disease Mother     Lymphoma Mother     Kidney disease Father     Heart disease Father     Heart failure Father     Diabetes type II Father     Diabetes type II Sister     Diabetes type II Brother     Uterine cancer Paternal Aunt     Breast cancer Neg Hx     Colon cancer Neg Hx     Ovarian cancer Neg Hx      I have reviewed and agree with the history as documented      E-Cigarette/Vaping    E-Cigarette Use Never User      E-Cigarette/Vaping Substances    Nicotine No     THC No     CBD No     Flavoring No     Other No     Unknown No      Social History     Tobacco Use    Smoking status: Former Smoker    Smokeless tobacco: Never Used    Tobacco comment: 5 packs/day until 6 (age 17-19) - As per [employer]    Vaping Use    Vaping Use: Never used   Substance Use Topics  Alcohol use: Not Currently    Drug use: Not Currently     Comment: As a teen - [facility]        Review of Systems   Constitutional: Positive for activity change and fatigue  Negative for chills and fever  HENT: Negative for ear pain, sinus pain and sore throat  Eyes: Negative for pain and visual disturbance  Respiratory: Negative for cough and shortness of breath  Cardiovascular: Negative for chest pain and palpitations  Gastrointestinal: Positive for abdominal pain and diarrhea  Negative for vomiting  Genitourinary: Negative for dysuria and hematuria  Musculoskeletal: Negative for arthralgias and back pain  Skin: Negative for color change and rash  Neurological: Positive for headaches  Negative for seizures and syncope  All other systems reviewed and are negative  Physical Exam  Physical Exam  Vitals and nursing note reviewed  Constitutional:       General: She is not in acute distress  Appearance: Normal appearance  She is well-developed  HENT:      Head: Normocephalic and atraumatic  Right Ear: External ear normal       Left Ear: External ear normal       Nose: Nose normal       Mouth/Throat:      Mouth: Mucous membranes are moist    Eyes:      Conjunctiva/sclera: Conjunctivae normal    Cardiovascular:      Rate and Rhythm: Normal rate  Rhythm irregular  Pulses: Normal pulses  Heart sounds: Normal heart sounds  No murmur heard  Pulmonary:      Effort: Pulmonary effort is normal  No respiratory distress  Breath sounds: Normal breath sounds  Abdominal:      General: Bowel sounds are normal  There is no distension or abdominal bruit  There are no signs of injury  Palpations: Abdomen is soft  Tenderness: There is generalized abdominal tenderness and tenderness in the left upper quadrant and left lower quadrant  There is guarding  There is no rebound  Musculoskeletal:         General: No swelling or deformity        Cervical back: Neck supple  Skin:     General: Skin is warm and dry  Capillary Refill: Capillary refill takes less than 2 seconds  Coloration: Skin is pale  Findings: Rash present  Comments: Left and right hip regions so slight erythema with some clear drainage from wound on each side  Neurological:      General: No focal deficit present  Mental Status: She is alert and oriented to person, place, and time  Mental status is at baseline           Vital Signs  ED Triage Vitals [04/18/22 1309]   Temp Pulse Respirations Blood Pressure SpO2   -- 61 19 111/62 100 %      Temp src Heart Rate Source Patient Position - Orthostatic VS BP Location FiO2 (%)   -- Monitor Sitting Left arm --      Pain Score       7           Vitals:    04/18/22 1545 04/18/22 1615 04/18/22 1700 04/18/22 1815   BP: 130/58 131/69 136/66 114/56   Pulse: 64 63 71 65   Patient Position - Orthostatic VS:             Visual Acuity      ED Medications  Medications   allopurinol (ZYLOPRIM) tablet 100 mg (has no administration in time range)   DULoxetine (CYMBALTA) delayed release capsule 20 mg (has no administration in time range)   levothyroxine tablet 125 mcg (has no administration in time range)   nystatin (MYCOSTATIN) powder ( Topical Not Given 4/18/22 1752)   pantoprazole (PROTONIX) EC tablet 40 mg (has no administration in time range)   acetaminophen (TYLENOL) tablet 650 mg (has no administration in time range)   ondansetron (ZOFRAN) injection 4 mg (has no administration in time range)   vancomycin (VANCOCIN) oral solution 125 mg (125 mg Oral Given 4/18/22 1752)   warfarin (COUMADIN) tablet 2 5 mg (2 5 mg Oral Given 4/18/22 2012)   warfarin (COUMADIN) tablet 4 mg (has no administration in time range)       Diagnostic Studies  Results Reviewed     Procedure Component Value Units Date/Time    Protime-INR [[id number]]  (Abnormal) Collected: 04/18/22 1757    Lab Status: Final result Specimen: Blood from Arm, Right Updated: 04/18/22 1907 Protime 24 2 seconds      INR 2 24    Lactic acid [[id number]]  (Normal) Collected: 04/18/22 1400    Lab Status: Final result Specimen: Blood from Arm, Left Updated: 04/18/22 1455     LACTIC ACID 1 2 mmol/L     Narrative:      Result may be elevated if tourniquet was used during collection  Blood culture #1 [[id number]] Collected: 04/18/22 1400    Lab Status: In process Specimen: Blood from Arm, Left Updated: 04/18/22 1404    Blood culture #2 [[id number]] Collected: 04/18/22 1400    Lab Status:  In process Specimen: Blood from Arm, Left Updated: 04/18/22 1404    Comprehensive metabolic panel [[id number]]  (Abnormal) Collected: 04/18/22 1335    Lab Status: Final result Specimen: Blood from Arm, Right Updated: 04/18/22 1359     Sodium 139 mmol/L      Potassium 3 9 mmol/L      Chloride 98 mmol/L      CO2 36 mmol/L      ANION GAP 5 mmol/L      BUN 25 mg/dL      Creatinine 0 94 mg/dL      Glucose 103 mg/dL      Calcium 8 9 mg/dL      Corrected Calcium 9 6 mg/dL      AST 16 U/L      ALT 12 U/L      Alkaline Phosphatase 118 U/L      Total Protein 7 9 g/dL      Albumin 3 1 g/dL      Total Bilirubin 0 66 mg/dL      eGFR 61 ml/min/1 73sq m     Narrative:      [place] guidelines for Chronic Kidney Disease (CKD):     Stage 1 with normal or high GFR (GFR > 90 mL/min/1 73 square meters)    Stage 2 Mild CKD (GFR = 60-89 mL/min/1 73 square meters)    Stage 3A Moderate CKD (GFR = 45-59 mL/min/1 73 square meters)    Stage 3B Moderate CKD (GFR = 30-44 mL/min/1 73 square meters)    Stage 4 Severe CKD (GFR = 15-29 mL/min/1 73 square meters)    Stage 5 End Stage CKD (GFR <15 mL/min/1 73 square meters)  Note: GFR calculation is accurate only with a steady state creatinine    Lipase [[id number]]  (Normal) Collected: 04/18/22 1335    Lab Status: Final result Specimen: Blood from Arm, Right Updated: 04/18/22 1359     Lipase 30 u/L     CBC and differential [[id number]]  (Abnormal) Collected: 04/18/22 1335    Lab Status: Final result Specimen: Blood from Arm, Right Updated: 04/18/22 1348     WBC 8 91 Thousand/uL      RBC 4 39 Million/uL      Hemoglobin 10 9 g/dL      Hematocrit 37 8 %      MCV 86 fL      MCH 24 8 pg      MCHC 28 8 g/dL      RDW 16 8 %      MPV 8 5 fL      Platelets 500 Thousands/uL      nRBC 0 /100 WBCs      Neutrophils Relative 75 %      Immat GRANS % 0 %      Lymphocytes Relative 15 %      Monocytes Relative 6 %      Eosinophils Relative 3 %      Basophils Relative 1 %      Neutrophils Absolute 6 69 Thousands/µL      Immature Grans Absolute 0 03 Thousand/uL      Lymphocytes Absolute 1 37 Thousands/µL      Monocytes Absolute 0 51 Thousand/µL      Eosinophils Absolute 0 26 Thousand/µL      Basophils Absolute 0 05 Thousands/µL                  CT head without contrast   Final Result by Yamilex Ontiveros MD (04/18 1520)      No acute intracranial abnormality  Workstation performed: [id number]                    Procedures  Procedures         ED Course  ED Course as of 04/18/22 2035 Mon Apr 18, 2022   1406 CT scan says the patient's body habitus is not conducive to fitting in the CT scan  We will attempt to place the patient for head CT, however the patient has had to be transported to a different facility in the past for an abdominal CT scan  1649 Case discussed with infectious Disease, Dr Chyna Davis, who notes that the patient should be admitted for more aggressive therapy for C diff  1649 Case was discussed with Nga Macias who will see the patient for admission                                               MDM    Disposition  Final diagnoses:   C  difficile colitis   Generalized abdominal pain     Time reflects when diagnosis was documented in both MDM as applicable and the Disposition within this note     Time User Action Codes Description Comment    4/18/2022  4:47 PM Gissel Ohara Add [A04 72] C  difficile colitis     4/18/2022  4:47 PM Gissel Ohara Add [R10 84] Generalized abdominal pain       ED Disposition     ED Disposition Condition Date/Time Comment    Admit Stable Mon Apr 18, 2022  4:47 PM Case was discussed with Casper Salmeron and the patient's admission status was agreed to be admission to the service of Dr Zack Saxena   Follow-up Information    None         Patient's Medications   Discharge Prescriptions    No medications on file       No discharge procedures on file      PDMP Review     None          ED Provider  Electronically Signed by           Rayshawn Franco DO  04/18/22 2035

## 2022-04-18 NOTE — H&P
[address]  H&P- Beatris Fitting 1950, 80 y o  female MRN: [id number]  Unit/Bed#: ED 02 Encounter: [id number]  Primary Care Provider: ORACIO Urbina   Date and time admitted to hospital: 4/18/2022  1:05 PM    * C  difficile colitis  Assessment & Plan  · Patient presented to the ED with complaints of severe persistent diarrhea  · History of c-diff infection in January  · Completed a 10 day course of oral Vancomycin  · C diff positive again on 02/18/2022  · Will continue supportive care   · Advance diet as tolerated  · Oral vancomycin 125 mg q6 hours while inpatient with extended taper course as an outpatient    [facility] )  Assessment & Plan  · Patient is currently rate controlled  · Continue home Coumadin regimen  · Daily PT/INR    Chronic diastolic congestive heart failure ([facility] [address] )  Assessment & Plan  · Does not appear to be in acute decompensation  · Hold home Lasix while patient is experiencing diarrhea      Other specified hypothyroidism  Assessment & Plan  · Continue home levothyroxine 125 mcg PO daily    Gastroesophageal reflux disease without esophagitis  Assessment & Plan  · Recent EGD with no signs of H pylori  · Continue home PPI    Mild episode of recurrent major depressive disorder (HCC)  Assessment & Plan  · Continue home Cymbalta    Class 3 severe obesity due to excess calories with body mass index (BMI) greater than or equal to 70 in [facility])  Assessment & Plan  · Present on admission as evidenced by BMI of 69  · Carbohydrate controlled diet  · Encourage lifestyle modifications and weight loss    Idiopathic chronic 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  Assessment & Plan  · Continue home allopurinol    VTE Prophylaxis:  Coumadin  Code Status: Level 1 Full Code    Anticipated Length of Stay:  Patient will be admitted on an Observation basis with an anticipated length of stay of  2 midnights     Justification for Hospital Stay: Recurrent C diff colitis    Total Time for Visit, including Counseling / Coordination of Care: 60 minutes  Greater than 50% of this total time spent on direct patient counseling and coordination of care  Chief Complaint:  Persistent diarrhea      History of Present Illness:    Jared Martin is a 80 y o  female with a past medical history significant for atrial fibrillation on Coumadin, GERD, hypothyroidism, anxiety with depression, recurrent C diff colitis who presents with complaints of severe, persistent diarrhea  The patient completed a 10 day course of oral vancomycin however tested positive for C diff on 04/15/2022  The patient states she has been experiencing very smelly diarrhea  In the ED, all vital signs were found to be stable  Laboratory analysis grossly unremarkable  The patient was assigned to observation in the setting of recurrent C diff colitis  Review of Systems:    Review of Systems   Constitutional: Negative for chills and fever  HENT: Negative for ear pain and sore throat  Eyes: Negative for pain and visual disturbance  Respiratory: Negative for cough and shortness of breath  Cardiovascular: Negative for chest pain and palpitations  Gastrointestinal: Positive for diarrhea  Negative for abdominal pain and vomiting  Genitourinary: Negative for dysuria and hematuria  Musculoskeletal: Negative for arthralgias and back pain  Skin: Negative for color change and rash  Neurological: Negative for seizures and syncope  All other systems reviewed and are negative        Past Medical and Surgical History:     Past Medical History:   Diagnosis Date    Atrial fibrillation ([facility] [address] )     Bladder stone     C  difficile colitis     Cellulitis     [facility]     Depression with anxiety     Disease of thyroid gland     Diverticulitis     Enterocolitis     History of abnormal cervical Pap smear     Kidney stone     Lymphedema     Menopause Age 53    Morbid obesity with BMI of 70 and over, adult ([facility] )     MRSA (methicillin resistant Staphylococcus aureus)     abdominal wound    Osteoarthritis     Phlebitis     left lower leg    Spondylolysis        Past Surgical History:   Procedure Laterality Date    APPENDECTOMY      CARPAL TUNNEL RELEASE      CHOLECYSTECTOMY      CYSTOSCOPY      stent    FOOT SURGERY  1982    bone spur    KNEE SURGERY      WISDOM TOOTH EXTRACTION         Meds/Allergies:    Prior to Admission medications    Medication Sig Start Date End Date Taking? Authorizing Provider   acetaminophen (TYLENOL) 650 mg CR tablet Take 650 mg by mouth every 8 (eight) hours    Historical Provider, MD   allopurinol (ZYLOPRIM) 100 mg tablet Take 1 tablet (100 mg total) by mouth daily 9/30/19   [facility], DO   DULoxetine (CYMBALTA) 20 mg capsule Take 1 capsule (20 mg total) by mouth daily 8/15/18   [facility], DO   furosemide (LASIX) 40 mg tablet Take 1 tablet (40 mg total) by mouth daily  Patient taking differently: Take 20 mg by mouth daily as needed Prn weight gain 10 pounds  8/23/19   [facility], DO   levothyroxine 125 mcg tablet Take 125 mcg by mouth daily    Historical Provider, MD   nystatin (MYCOSTATIN) powder Apply topically 2 (two) times a day 11/21/21   Nadine Hernandes MD   omeprazole (PriLOSEC) 40 MG capsule Take 1 capsule (40 mg total) by mouth daily 3/28/22   ORACIO Benitez   triamcinolone (KENALOG) 0 1 % cream Apply 0 1 application topically 2 (two) times a day 12/16/21   Historical Provider, MD   warfarin (COUMADIN) 2 mg tablet Take 1 tablet (2 mg total) by mouth every other day Monday, Wednesday, Friday, sunday 2/22/22   ORACIO Daniel   warfarin (COUMADIN) 5 mg tablet Take 1 tablet (5 mg total) by mouth every other day Tuesday, Thursday, saturday 2/22/22   ORACIO Daniel       Allergies:    Allergies   Allergen Reactions    Augmentin Es-600  [Amoxicillin-Pot Clavulanate]     Penicillins Other (See Comments)     Tolerates cefepime (11/18/21)    Sulfa Antibiotics Hives    Vitamin C [Ascorbate - Food Allergy]     Latex Rash and Edema       Social History:     Marital Status: Single   Substance Use History:   Social History     Substance and Sexual Activity   Alcohol Use Not Currently     Social History     Tobacco Use   Smoking Status Former Smoker   Smokeless Tobacco Never Used   Tobacco Comment    5 packs/day until 5 (age 17-19) - As per [employer]      Social History     Substance and Sexual Activity   Drug Use Not Currently    Comment: As a teen - As per [employer]        Family History:    Pertinent family history reviewed    Physical Exam:     Vitals:   Blood Pressure: 131/69 (04/18/22 1615)  Pulse: 63 (04/18/22 1615)  Respirations: 18 (04/18/22 1537)  SpO2: 97 % (04/18/22 1615)    Physical Exam  Vitals and nursing note reviewed  Constitutional:       General: She is not in acute distress  Appearance: She is well-developed  HENT:      Head: Normocephalic and atraumatic  Eyes:      Conjunctiva/sclera: Conjunctivae normal    Cardiovascular:      Rate and Rhythm: Normal rate and regular rhythm  Heart sounds: No murmur heard  Pulmonary:      Effort: Pulmonary effort is normal  No respiratory distress  Breath sounds: Normal breath sounds  Abdominal:      Palpations: Abdomen is soft  Tenderness: There is no abdominal tenderness  Musculoskeletal:      Cervical back: Neck supple  Skin:     General: Skin is warm and dry  Neurological:      Mental Status: She is alert            Additional Data:     Lab Results: I have reviewed pertinent results     Results from last 7 days   Lab Units 04/18/22  1335   WBC Thousand/uL 8 91   HEMOGLOBIN g/dL 10 9*   HEMATOCRIT % 37 8   PLATELETS Thousands/uL 448*   NEUTROS PCT % 75   LYMPHS PCT % 15   MONOS PCT % 6   EOS PCT % 3     Results from last 7 days   Lab Units 04/18/22  1335   SODIUM mmol/L 139   POTASSIUM mmol/L 3 9   CHLORIDE mmol/L 98   CO2 mmol/L 36*   BUN mg/dL 25   CREATININE mg/dL 0 94   ANION GAP mmol/L 5   CALCIUM mg/dL 8 9   ALBUMIN g/dL 3 1*   TOTAL BILIRUBIN mg/dL 0 66   ALK PHOS U/L 118*   ALT U/L 12   AST U/L 16   GLUCOSE RANDOM mg/dL 103                 Results from last 7 days   Lab Units 04/18/22  1400   LACTIC ACID mmol/L 1 2       Imaging: I have reviewed pertinent imaging     CT head without contrast   Final Result by Eliza Wu MD (04/18 1520)      No acute intracranial abnormality  Workstation performed: [id number]             EKG, Pathology, and Other Studies Reviewed on Admission:   · EKG: Reviewed     [employer] / Epic Records Reviewed    ** Please Note: This note has been constructed using a voice recognition system   **

## 2022-04-18 NOTE — ASSESSMENT & PLAN NOTE
· Does not appear to be in acute decompensation  · Hold home Lasix while patient is experiencing diarrhea

## 2022-04-19 LAB
ANION GAP SERPL CALCULATED.3IONS-SCNC: 6 MMOL/L (ref 4–13)
BASOPHILS # BLD AUTO: 0.05 THOUSANDS/ΜL (ref 0–0.1)
BASOPHILS NFR BLD AUTO: 1 % (ref 0–1)
BUN SERPL-MCNC: 23 MG/DL (ref 5–25)
CALCIUM SERPL-MCNC: 8.6 MG/DL (ref 8.4–10.2)
CHLORIDE SERPL-SCNC: 101 MMOL/L (ref 96–108)
CO2 SERPL-SCNC: 32 MMOL/L (ref 21–32)
CREAT SERPL-MCNC: 0.98 MG/DL (ref 0.6–1.3)
EOSINOPHIL # BLD AUTO: 0.26 THOUSAND/ΜL (ref 0–0.61)
EOSINOPHIL NFR BLD AUTO: 3 % (ref 0–6)
ERYTHROCYTE [DISTWIDTH] IN BLOOD BY AUTOMATED COUNT: 16.8 % (ref 11.6–15.1)
GFR SERPL CREATININE-BSD FRML MDRD: 58 ML/MIN/1.73SQ M
GLUCOSE P FAST SERPL-MCNC: 111 MG/DL (ref 65–99)
GLUCOSE SERPL-MCNC: 111 MG/DL (ref 65–140)
HCT VFR BLD AUTO: 35 % (ref 34.8–46.1)
HGB BLD-MCNC: 10.2 G/DL (ref 11.5–15.4)
IMM GRANULOCYTES # BLD AUTO: 0.02 THOUSAND/UL (ref 0–0.2)
IMM GRANULOCYTES NFR BLD AUTO: 0 % (ref 0–2)
LYMPHOCYTES # BLD AUTO: 1.39 THOUSANDS/ΜL (ref 0.6–4.47)
LYMPHOCYTES NFR BLD AUTO: 15 % (ref 14–44)
MCH RBC QN AUTO: 24.8 PG (ref 26.8–34.3)
MCHC RBC AUTO-ENTMCNC: 29.1 G/DL (ref 31.4–37.4)
MCV RBC AUTO: 85 FL (ref 82–98)
MONOCYTES # BLD AUTO: 0.57 THOUSAND/ΜL (ref 0.17–1.22)
MONOCYTES NFR BLD AUTO: 6 % (ref 4–12)
NEUTROPHILS # BLD AUTO: 7.06 THOUSANDS/ΜL (ref 1.85–7.62)
NEUTS SEG NFR BLD AUTO: 75 % (ref 43–75)
NRBC BLD AUTO-RTO: 0 /100 WBCS
PLATELET # BLD AUTO: 424 THOUSANDS/UL (ref 149–390)
PMV BLD AUTO: 8.4 FL (ref 8.9–12.7)
POTASSIUM SERPL-SCNC: 3.6 MMOL/L (ref 3.5–5.3)
RBC # BLD AUTO: 4.12 MILLION/UL (ref 3.81–5.12)
SODIUM SERPL-SCNC: 139 MMOL/L (ref 135–147)
WBC # BLD AUTO: 9.35 THOUSAND/UL (ref 4.31–10.16)

## 2022-04-19 PROCEDURE — 80048 BASIC METABOLIC PNL TOTAL CA: CPT | Performed by: INTERNAL MEDICINE

## 2022-04-19 PROCEDURE — 36415 COLL VENOUS BLD VENIPUNCTURE: CPT | Performed by: INTERNAL MEDICINE

## 2022-04-19 PROCEDURE — 99232 SBSQ HOSP IP/OBS MODERATE 35: CPT | Performed by: INTERNAL MEDICINE

## 2022-04-19 PROCEDURE — 85025 COMPLETE CBC W/AUTO DIFF WBC: CPT | Performed by: INTERNAL MEDICINE

## 2022-04-19 RX ORDER — TRIAMCINOLONE ACETONIDE 1 MG/G
CREAM TOPICAL 2 TIMES DAILY
Status: DISCONTINUED | OUTPATIENT
Start: 2022-04-19 | End: 2022-04-20 | Stop reason: HOSPADM

## 2022-04-19 RX ADMIN — WARFARIN SODIUM 4 MG: 2.5 TABLET ORAL at 18:26

## 2022-04-19 RX ADMIN — Medication 125 MG: at 14:18

## 2022-04-19 RX ADMIN — Medication 125 MG: at 22:31

## 2022-04-19 RX ADMIN — Medication 125 MG: at 07:45

## 2022-04-19 RX ADMIN — ACETAMINOPHEN 650 MG: 325 TABLET ORAL at 10:09

## 2022-04-19 RX ADMIN — TRIAMCINOLONE ACETONIDE: 1 CREAM TOPICAL at 11:21

## 2022-04-19 RX ADMIN — PANTOPRAZOLE SODIUM 40 MG: 40 TABLET, DELAYED RELEASE ORAL at 07:45

## 2022-04-19 RX ADMIN — ALLOPURINOL 100 MG: 100 TABLET ORAL at 08:36

## 2022-04-19 RX ADMIN — LEVOTHYROXINE SODIUM 125 MCG: 25 TABLET ORAL at 05:48

## 2022-04-19 RX ADMIN — DULOXETINE HYDROCHLORIDE 20 MG: 20 CAPSULE, DELAYED RELEASE ORAL at 08:36

## 2022-04-19 RX ADMIN — Medication 125 MG: at 01:18

## 2022-04-19 RX ADMIN — ACETAMINOPHEN 650 MG: 325 TABLET ORAL at 14:18

## 2022-04-19 NOTE — CASE MANAGEMENT
Case Management Assessment & Discharge Planning Note    Patient name Rafael Melgoza  Location ED 02/ED 02 MRN [id number]  : 1950 Date 2022       Current Admission Date: 2022  Current Admission Diagnosis:C  difficile colitis   Patient Active Problem List    Diagnosis Date Noted    Chronic atrial fibrillation ([facility] ) 2022    C  difficile colitis 2022    Diarrhea of presumed infectious origin 2022    Generalized abdominal pain     Lymphedema of extremity 2021    Paroxysmal atrial fibrillation ([place] [address] ) 10/03/2020    Other specified hypothyroidism 10/03/2020    Mild episode of recurrent major depressive disorder ([place] [address] ) 10/03/2020    Idiopathic chronic 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 10/03/2020    Primary osteoarthritis involving multiple joints 10/03/2020    Class 3 severe obesity due to excess calories with body mass index (BMI) greater than or equal to 70 in adult [facility]) 10/03/2020    Chronic diastolic congestive heart failure ([place] [address] ) 10/03/2020    Gastroesophageal reflux disease without esophagitis 10/03/2020    Hx MRSA infection 2020    Left ovarian cyst 2017    Depression with anxiety 07/15/2017    Bilateral lower extremity edema 2017    Chronic pain disorder 2017    Anemia 2016    Ambulatory dysfunction 2016    Stage 3a chronic kidney disease ([place] [address] ) 2016    Adjustment disorder 2013    Renal insufficiency 2013    Irritable bowel syndrome 2012    Left atrial enlargement 2012    Left ventricular hypertrophy 2012    Carpal tunnel syndrome 2012    Gout 2012    Hyperlipidemia 2012    Osteoarthritis 2012    Symptomatic menopausal or female climacteric states 2012      LOS (days): 0  Geometric Mean LOS (GMLOS) (days): 3 80  Days to GMLOS:3 6     OBJECTIVE:    Risk of Unplanned Readmission Score: 22         Current admission status: Inpatient  Referral Reason: Other (Discharge planning)    Preferred Pharmacy:   [facility] [address] [place] [address]  Phone: [phone] Fax: [phone]    [facility] [address] [address]  Phone: [phone] Fax: [phone]    Primary Care Provider: ORACIO Hart    Primary Insurance: MEDICARE  Secondary Insurance: AARP    ASSESSMENT:  Jessica 26 Proxies    There are no active Health Care Proxies on file  Advance Directives  Does patient have a [address]?: No  Was patient offered paperwork?: Yes  Does patient currently have a Health Care decision maker?: No  Does patient have Advance Directives?: No  Was patient offered paperwork?: Yes  Primary Contact: Hilton Orr - malini         Readmission Root Cause  30 Day Readmission: No    Patient Information  Admitted from[de-identified] Home  Mental Status: Alert  During Assessment patient was accompanied by: Not accompanied during assessment  Assessment information provided by[de-identified] Patient  Primary Caregiver: Self  Support Systems: Family members  [place] of Residence: [address] do you live in?: [address] access options   Select all that apply : Ramp  Type of Current Residence: 2 story home (stays on 1st floor)  Upon entering residence, is there a bedroom on the main floor (no further steps)?: Yes  Upon entering residence, is there a bathroom on the main floor (no further steps)?: Yes  In the last 12 months, was there a time when you were not able to pay the mortgage or rent on time?: No  In the last 12 months, how many places have you lived?: 1  In the last 12 months, was there a time when you did not have a steady place to sleep or slept in a shelter (including now)?: No  Homeless/housing insecurity resource given?: N/A  Living Arrangements: Lives Alone  Is patient a ?: No    Activities of Daily Living Prior to Admission  Functional Status: Assistance  Completes ADLs independently?: No  Level of ADL dependence: Assistance  Ambulates independently?: No  Level of ambulatory dependence: Assistance  Does patient use assisted devices?: Yes  Does patient currently own DME?: Yes  Does patient have a history of Outpatient Therapy (PT/OT)?: No  Does the patient have a history of Short-Term Rehab?: Yes (Drake Ngo)  Does patient have a history of [facility]?: Yes (Radhamarc Cristina)  Does patient currently have [facility]?: No         Patient Information Continued  Income Source: Pension/senior care  Does patient have prescription coverage?: Yes  Within the past 12 months, you worried that your food would run out before you got the money to buy more : Never true  Within the past 12 months, the food you bought just didnt last and you didnt have money to get more : Never true  Food insecurity resource given?: N/A  Does patient receive dialysis treatments?: No  Does patient have a history of substance abuse?: No  Does patient have a history of Mental Health Diagnosis?: No         Means of Transportation  Means of Transport to Appts[de-identified] Other (Comment) (Medical transport)  In the past 12 months, has lack of transportation kept you from medical appointments or from getting medications?: No  In the past 12 months, has lack of transportation kept you from meetings, work, or from getting things needed for daily living?: No  Was application for public transport provided?: N/A        DISCHARGE DETAILS:    Discharge planning discussed with[de-identified] Patient  Freedom of Choice: Yes  Comments - Freedom of Choice: pt has CG 12 hours per day via [address]  home care  Per pt her CG is unavailable today  She will be available sometime tomorrow afternoon  Pt to verify what time so CM can work on [employer] transport home tomorrow                  Contacts  Patient Contacts: Dena Rodriguez  Relationship to Patient[de-identified] Family  Contact Method: Phone  Phone Number: [phone]  Reason/Outcome: Discharge Peggy Dalton         Is the patient interested in [facility] [address] [facility] at discharge?: No    DME Referral Provided  Referral made for DME?: No         Would you like to participate in our [facility] [address] service program?  : No - Declined    Treatment Team Recommendation: Home with [facility]  Discharge Destination Plan[de-identified] Home with Daniel at Discharge : [facility]

## 2022-04-19 NOTE — PLAN OF CARE
Problem: INFECTION - ADULT  Goal: Absence or prevention of progression during hospitalization  Description: INTERVENTIONS:  - Assess and monitor for signs and symptoms of infection  - Monitor lab/diagnostic results  - Monitor all insertion sites, i e  indwelling lines, tubes, and drains  - Monitor endotracheal if appropriate and nasal secretions for changes in amount and color  - [place] appropriate cooling/warming therapies per order  - Administer medications as ordered  - Instruct and encourage patient and family to use good hand hygiene technique  - Identify and instruct in appropriate isolation precautions for identified infection/condition  Outcome: Progressing

## 2022-04-19 NOTE — PROGRESS NOTES
Toby 45  Progress Note - Diane Chaudhary 1950, 80 y o  female MRN: [id number]  Unit/Bed#: ED 02 Encounter: [id number]  Primary Care Provider: ORACIO Peres   Date and time admitted to hospital: 4/18/2022  1:05 PM    * C  difficile colitis  Assessment & Plan  · Patient presented to the ED with complaints of severe persistent diarrhea  · History of c-diff infection in January  · Completed a 10 day course of oral Vancomycin  · C diff positive again on 02/18/2022  · Will continue supportive care   · Advance diet as tolerated  · Oral vancomycin 125 mg q6 hours while inpatient with extended taper course as an outpatient    Paroxysmal atrial fibrillation ([facility] )  Assessment & Plan  · Patient is currently rate controlled  · Continue home Coumadin regimen  · Daily PT/INR    Chronic diastolic congestive heart failure ([facility] )  Assessment & Plan  · Does not appear to be in acute decompensation  · Hold home Lasix while patient is experiencing diarrhea      Other specified hypothyroidism  Assessment & Plan  · Continue home levothyroxine 125 mcg PO daily    Gastroesophageal reflux disease without esophagitis  Assessment & Plan  · Recent EGD with no signs of H pylori  · Continue home PPI    Mild episode of recurrent major depressive disorder (HCC)  Assessment & Plan  · Continue home Cymbalta    Class 3 severe obesity due to excess calories with body mass index (BMI) greater than or equal to 70 in adult [facility])  Assessment & Plan  · Present on admission as evidenced by BMI of 69  · Carbohydrate controlled diet  · Encourage lifestyle modifications and weight loss    Idiopathic chronic 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  Assessment & Plan  · Continue home allopurinol      VTE Pharmacologic Prophylaxis:  Coumadin    Patient Centered Rounds:  Patient care rounds were performed with nursing    Discussions with Specialists or Other Care Team Provider:  Case Management, Nursing    Education and Discussions with Family / Patient:  Spoke with patient    Time Spent for Care: 30  More than 50% of total time spent on counseling and coordination of care as described above  Current Length of Stay: 0 day(s)    Current Patient Status: Inpatient     Certification Statement: The patient will continue to require additional inpatient hospital stay due to C diff colitis    Discharge Plan:  Discharge tomorrow morning as caregiver is not at home today ; will provide patient with oral vancomycin    Code Status: Level 1 - Full Code      Subjective:   Patient seen and examined at bedside  No acute events overnight  States diarrhea is improving  Caregiver not at home today so will likely discharge to home tomorrow  Objective:     Vitals:   Temp (24hrs), Av 8 °F (36 6 °C), Min:97 8 °F (36 6 °C), Max:97 8 °F (36 6 °C)    Temp:  [97 8 °F (36 6 °C)] 97 8 °F (36 6 °C)  HR:  [60-71] 64  Resp:  [18-19] 18  BP: ()/(55-69) 116/58  SpO2:  [94 %-100 %] 94 %  There is no height or weight on file to calculate BMI  Input and Output Summary (last 24 hours): Intake/Output Summary (Last 24 hours) at 2022 0911  Last data filed at 2022 1717  Gross per 24 hour   Intake 210 42 ml   Output --   Net 210 42 ml       Physical Exam:     Physical Exam  Vitals and nursing note reviewed  Constitutional:       General: She is not in acute distress  Appearance: She is well-developed  She is obese  HENT:      Head: Normocephalic and atraumatic  Eyes:      Conjunctiva/sclera: Conjunctivae normal    Cardiovascular:      Rate and Rhythm: Normal rate and regular rhythm  Heart sounds: No murmur heard  Pulmonary:      Effort: Pulmonary effort is normal  No respiratory distress  Breath sounds: Normal breath sounds  Abdominal:      Palpations: Abdomen is soft  Tenderness: There is no abdominal tenderness  Musculoskeletal:      Cervical back: Neck supple     Skin:     General: Skin is warm and dry  Neurological:      Mental Status: She is alert  Additional Data:     Labs: I have reviewed pertinent results     Results from last 7 days   Lab Units 04/19/22  0545   WBC Thousand/uL 9 35   HEMOGLOBIN g/dL 10 2*   HEMATOCRIT % 35 0   PLATELETS Thousands/uL 424*   NEUTROS PCT % 75   LYMPHS PCT % 15   MONOS PCT % 6   EOS PCT % 3     Results from last 7 days   Lab Units 04/19/22  0545 04/18/22  1335 04/18/22  1335   SODIUM mmol/L 139   < > 139   POTASSIUM mmol/L 3 6   < > 3 9   CHLORIDE mmol/L 101   < > 98   CO2 mmol/L 32   < > 36*   BUN mg/dL 23   < > 25   CREATININE mg/dL 0 98   < > 0 94   ANION GAP mmol/L 6   < > 5   CALCIUM mg/dL 8 6   < > 8 9   ALBUMIN g/dL  --   --  3 1*   TOTAL BILIRUBIN mg/dL  --   --  0 66   ALK PHOS U/L  --   --  118*   ALT U/L  --   --  12   AST U/L  --   --  16   GLUCOSE RANDOM mg/dL 111   < > 103    < > = values in this interval not displayed  Results from last 7 days   Lab Units 04/18/22  1757   INR  2 24*             Results from last 7 days   Lab Units 04/18/22  1400   LACTIC ACID mmol/L 1 2         Imaging: I have reviewed pertinent imaging       Recent Cultures (last 7 days):     Results from last 7 days   Lab Units 04/18/22  1400   BLOOD CULTURE  Received in Microbiology Lab  Culture in Progress  Received in Microbiology Lab  Culture in Progress         Last 24 Hours Medication List:   Current Facility-Administered Medications   Medication Dose Route Frequency Provider Last Rate    acetaminophen  650 mg Oral Q4H PRN Larry Finely, DO      allopurinol  100 mg Oral Daily Larry Finely, DO      DULoxetine  20 mg Oral Daily Larry Finely, DO      levothyroxine  125 mcg Oral Daily Larry Finely, DO      ondansetron  4 mg Intravenous Q6H PRN Larry Finely, DO      pantoprazole  40 mg Oral Early Morning Larry Finely, DO      triamcinolone   Topical BID Larry Finely, DO      vancomycin  125 mg Oral Q6H [address] Larry Finely, DO      warfarin 2 5 mg Oral Once per day on Sun Mon Wed Fri Thiago Murdock DO      warfarin  4 mg Oral Once per day on Tue Thu Sat Hudson Cooks, DO          Today, Patient Was Seen By: Hudson Cooks, DO    ** Please Note: Dictation voice to text software may have been used in the creation of this document   **

## 2022-04-20 VITALS
OXYGEN SATURATION: 93 % | HEART RATE: 65 BPM | DIASTOLIC BLOOD PRESSURE: 61 MMHG | SYSTOLIC BLOOD PRESSURE: 120 MMHG | TEMPERATURE: 98 F | RESPIRATION RATE: 18 BRPM

## 2022-04-20 LAB
ANION GAP SERPL CALCULATED.3IONS-SCNC: 8 MMOL/L (ref 4–13)
BASOPHILS # BLD AUTO: 0.05 THOUSANDS/ΜL (ref 0–0.1)
BASOPHILS NFR BLD AUTO: 1 % (ref 0–1)
BUN SERPL-MCNC: 24 MG/DL (ref 5–25)
CALCIUM SERPL-MCNC: 8.7 MG/DL (ref 8.4–10.2)
CHLORIDE SERPL-SCNC: 102 MMOL/L (ref 96–108)
CO2 SERPL-SCNC: 28 MMOL/L (ref 21–32)
CREAT SERPL-MCNC: 0.95 MG/DL (ref 0.6–1.3)
EOSINOPHIL # BLD AUTO: 0.27 THOUSAND/ΜL (ref 0–0.61)
EOSINOPHIL NFR BLD AUTO: 3 % (ref 0–6)
ERYTHROCYTE [DISTWIDTH] IN BLOOD BY AUTOMATED COUNT: 16.7 % (ref 11.6–15.1)
GFR SERPL CREATININE-BSD FRML MDRD: 60 ML/MIN/1.73SQ M
GLUCOSE SERPL-MCNC: 111 MG/DL (ref 65–140)
HCT VFR BLD AUTO: 34.8 % (ref 34.8–46.1)
HGB BLD-MCNC: 10 G/DL (ref 11.5–15.4)
IMM GRANULOCYTES # BLD AUTO: 0.02 THOUSAND/UL (ref 0–0.2)
IMM GRANULOCYTES NFR BLD AUTO: 0 % (ref 0–2)
LYMPHOCYTES # BLD AUTO: 1.4 THOUSANDS/ΜL (ref 0.6–4.47)
LYMPHOCYTES NFR BLD AUTO: 17 % (ref 14–44)
MAGNESIUM SERPL-MCNC: 1.9 MG/DL (ref 1.9–2.7)
MCH RBC QN AUTO: 24.6 PG (ref 26.8–34.3)
MCHC RBC AUTO-ENTMCNC: 28.7 G/DL (ref 31.4–37.4)
MCV RBC AUTO: 86 FL (ref 82–98)
MONOCYTES # BLD AUTO: 0.51 THOUSAND/ΜL (ref 0.17–1.22)
MONOCYTES NFR BLD AUTO: 6 % (ref 4–12)
NEUTROPHILS # BLD AUTO: 6.15 THOUSANDS/ΜL (ref 1.85–7.62)
NEUTS SEG NFR BLD AUTO: 73 % (ref 43–75)
NRBC BLD AUTO-RTO: 0 /100 WBCS
PHOSPHATE SERPL-MCNC: 3.3 MG/DL (ref 2.3–4.1)
PLATELET # BLD AUTO: 415 THOUSANDS/UL (ref 149–390)
PMV BLD AUTO: 8.6 FL (ref 8.9–12.7)
POTASSIUM SERPL-SCNC: 3.7 MMOL/L (ref 3.5–5.3)
RBC # BLD AUTO: 4.06 MILLION/UL (ref 3.81–5.12)
SODIUM SERPL-SCNC: 138 MMOL/L (ref 135–147)
WBC # BLD AUTO: 8.4 THOUSAND/UL (ref 4.31–10.16)

## 2022-04-20 PROCEDURE — 84100 ASSAY OF PHOSPHORUS: CPT | Performed by: INTERNAL MEDICINE

## 2022-04-20 PROCEDURE — 80048 BASIC METABOLIC PNL TOTAL CA: CPT | Performed by: INTERNAL MEDICINE

## 2022-04-20 PROCEDURE — 99239 HOSP IP/OBS DSCHRG MGMT >30: CPT | Performed by: INTERNAL MEDICINE

## 2022-04-20 PROCEDURE — 83735 ASSAY OF MAGNESIUM: CPT | Performed by: INTERNAL MEDICINE

## 2022-04-20 PROCEDURE — 85025 COMPLETE CBC W/AUTO DIFF WBC: CPT | Performed by: INTERNAL MEDICINE

## 2022-04-20 RX ORDER — DOXYCYCLINE HYCLATE 100 MG/1
100 CAPSULE ORAL EVERY 12 HOURS SCHEDULED
Status: DISCONTINUED | OUTPATIENT
Start: 2022-04-20 | End: 2022-04-20 | Stop reason: HOSPADM

## 2022-04-20 RX ORDER — DOXYCYCLINE HYCLATE 100 MG/1
100 CAPSULE ORAL EVERY 12 HOURS SCHEDULED
Qty: 14 CAPSULE | Refills: 0 | Status: SHIPPED | OUTPATIENT
Start: 2022-04-20 | End: 2022-04-27

## 2022-04-20 RX ADMIN — LEVOTHYROXINE SODIUM 125 MCG: 25 TABLET ORAL at 05:21

## 2022-04-20 RX ADMIN — Medication 125 MG: at 05:21

## 2022-04-20 RX ADMIN — DOXYCYCLINE 100 MG: 100 CAPSULE ORAL at 09:36

## 2022-04-20 RX ADMIN — DULOXETINE HYDROCHLORIDE 20 MG: 20 CAPSULE, DELAYED RELEASE ORAL at 09:37

## 2022-04-20 RX ADMIN — PANTOPRAZOLE SODIUM 40 MG: 40 TABLET, DELAYED RELEASE ORAL at 09:37

## 2022-04-20 RX ADMIN — Medication 125 MG: at 10:35

## 2022-04-20 RX ADMIN — ALLOPURINOL 100 MG: 100 TABLET ORAL at 09:36

## 2022-04-20 RX ADMIN — ACETAMINOPHEN 650 MG: 325 TABLET ORAL at 09:35

## 2022-04-20 NOTE — PLAN OF CARE
Patient is AxOx4 & is on RA  VSS  Patient denies pain/discomfort  Patient sleeps between care  Patient is an assist x2 for care  Ivana William is utilized  GI Contact Precautions are maintained  Bed is in lowest position  Alarms audible  All needs are within reach    Problem: INFECTION - ADULT  Goal: Absence or prevention of progression during hospitalization  Description: INTERVENTIONS:  - Assess and monitor for signs and symptoms of infection  - Monitor lab/diagnostic results  - Monitor all insertion sites, i e  indwelling lines, tubes, and drains  - Monitor endotracheal if appropriate and nasal secretions for changes in amount and color  - [place] appropriate cooling/warming therapies per order  - Administer medications as ordered  - Instruct and encourage patient and family to use good hand hygiene technique  - Identify and instruct in appropriate isolation precautions for identified infection/condition  Outcome: Progressing     Problem: SAFETY ADULT  Goal: Patient will remain free of falls  Description: INTERVENTIONS:  - Educate patient/family on patient safety including physical limitations  - Instruct patient to call for assistance with activity   - Consult OT/PT to assist with strengthening/mobility   - Keep Call bell within reach  - Keep bed low and locked with side rails adjusted as appropriate  - Keep care items and personal belongings within reach  - Initiate and maintain comfort rounds  - Make Fall Risk Sign visible to staff  - Offer Toileting every 2 Hours, in advance of need  - Initiate/Maintain bed alarm  - Obtain necessary fall risk management equipment:  - Apply yellow socks and bracelet for high fall risk patients  - Consider moving patient to room near nurses station  Outcome: Progressing     Problem: Knowledge Deficit  Goal: Patient/family/caregiver demonstrates understanding of disease process, treatment plan, medications, and discharge instructions  Description: Complete learning assessment and assess knowledge base    Interventions:  - Provide teaching at level of understanding  - Provide teaching via preferred learning methods  Outcome: Progressing     Problem: MOBILITY - ADULT  Goal: Maintain or return to baseline ADL function  Description: INTERVENTIONS:  -  Assess patient's ability to carry out ADLs; assess patient's baseline for ADL function and identify physical deficits which impact ability to perform ADLs (bathing, care of mouth/teeth, toileting, grooming, dressing, etc )  - Assess/evaluate cause of self-care deficits   - Assess range of motion  - Assess patient's mobility; develop plan if impaired  - Assess patient's need for assistive devices and provide as appropriate  - Encourage maximum independence but intervene and supervise when necessary  - Involve family in performance of ADLs  - Assess for home care needs following discharge   - Consider OT consult to assist with ADL evaluation and planning for discharge  - Provide patient education as appropriate  Outcome: Progressing     Problem: Prexisting or High Potential for Compromised Skin Integrity  Goal: Skin integrity is maintained or improved  Description: INTERVENTIONS:  - Identify patients at risk for skin breakdown  - Assess and monitor skin integrity  - Assess and monitor nutrition and hydration status  - Monitor labs   - Assess for incontinence   - Turn and reposition patient  - Assist with mobility/ambulation  - Relieve pressure over bony prominences  - Avoid friction and shearing  - Provide appropriate hygiene as needed including keeping skin clean and dry  - Evaluate need for skin moisturizer/barrier cream  - Collaborate with interdisciplinary team   - Patient/family teaching  - Consider wound care consult   Outcome: Progressing     Problem: Potential for Falls  Goal: Patient will remain free of falls  Description: INTERVENTIONS:  - Educate patient/family on patient safety including physical limitations  - Instruct patient to call for assistance with activity   - Consult OT/PT to assist with strengthening/mobility   - Keep Call bell within reach  - Keep bed low and locked with side rails adjusted as appropriate  - Keep care items and personal belongings within reach  - Initiate and maintain comfort rounds  - Make Fall Risk Sign visible to staff  - Offer Toileting every 2 Hours, in advance of need  - Initiate/Maintain bed alarm  - Obtain necessary fall risk management equipment:  - Apply yellow socks and bracelet for high fall risk patients  - Consider moving patient to room near nurses station  Outcome: Progressing 134

## 2022-04-20 NOTE — WOUND OSTOMY CARE
Consult Note - Wound   Tylor Orlando 80 y o  female MRN: [id number]  Unit/Bed#: -01 Encounter: [id number]    History and Present Illness: Patient assessed prior to discharge    80year old female admitted with C-diff colitis  Wound care consulted for MASD to the abdominal fold  Patient history significant for a-fib, major depressive disorder, severe obesity with a BMI of 68 66, CHF, MRSA, and stage 3 CKD  Patient states she is bedbound for the past 4 years  Assessment Findings:   Patient in bed for assessment, her caregiver was present for assessment  Patient states she has chronic areas of drainage on the left and right lateral abdominal pannus  At this time there is no drainage from either side, yellow serous drainage noted to the incontinence pad on the right abdominal pannus, no drainage on the left  The right pannus slightly reddened, left pannus with distinct area of erythema, which is blanchable and no drainage on pad  Scattered areas of MASD in the abdominal fold, patient refused Maxorb rope or Interdry, states she would like a 4X4  She has kenalog cream at home  1  Bilateral heel intact  2  MASD/ITD to the abdominal fold with areas of partial thickness wound beds, no drainage, 4X4 placed after cleansing per patient request  3  Bilateral breast folds, buttocks and sacrum intact  4  Right and left lateral abdominal pannus erythema  No drainage at this time  Allevyn foams placed on lateral right and left pannus  5   POA-small deep tissue injury to the right lateral thigh, non-blanchable maroon circular area, per her caregiver this was present prior to admission    Wounds:  Wound 11/18/21 Thigh Anterior;Left;Proximal (Active)   Wound Image   04/20/22 1458   Wound Length (cm) 15 cm 04/20/22 1458   Wound Width (cm) 11 cm 04/20/22 1458   Wound Surface Area (cm^2) 165 cm^2 04/20/22 1458       Wound 04/19/22 MASD Pelvis Anterior (Active)   Wound Description Pink 04/20/22 0900   Alison-wound Assessment Erythema 04/19/22 1700       Wound 04/20/22 Pressure Injury Thigh Anterior;Right (Active)   Wound Image   04/20/22 1500   Wound Length (cm) 1 cm 04/20/22 1500   Wound Width (cm) 1 5 cm 04/20/22 1500   Wound Depth (cm) 0 cm 04/20/22 1500   Wound Surface Area (cm^2) 1 5 cm^2 04/20/22 1500   Wound Volume (cm^3) 0 cm^3 04/20/22 1500   Calculated Wound Volume (cm^3) 0 cm^3 04/20/22 1500     Reviewed plan of care with primary RN  Questions or concerns [id number] [facility] Nurse    Janna Rebollar BSN, RN, Tricia Gonzales

## 2022-04-20 NOTE — ASSESSMENT & PLAN NOTE
· Erythema noted on left hip  · Patient does have a history of open wounds  · No leukocytosis  · Patient is afebrile  · Will start oral doxycycline x7 days ; prescription sent to patient's pharmacy

## 2022-04-20 NOTE — CASE MANAGEMENT
Case Management Discharge Planning Note    Patient name Jared Martin  Location /-70 MRN [id number]  : 1950 Date 2022       Current Admission Date: 2022  Current Admission Diagnosis:C  difficile colitis   Patient Active Problem List    Diagnosis Date Noted    Chronic atrial fibrillation ([facility] ) 2022    C  difficile colitis 2022    Diarrhea of presumed infectious origin 2022    Generalized abdominal pain     Lymphedema of extremity 2021    Paroxysmal atrial fibrillation ([facility] ) 10/03/2020    Other specified hypothyroidism 10/03/2020    Mild episode of recurrent major depressive disorder ([place] [address] ) 10/03/2020    Idiopathic chronic 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 10/03/2020    Primary osteoarthritis involving multiple joints 10/03/2020    Class 3 severe obesity due to excess calories with body mass index (BMI) greater than or equal to 70 in adult [facility]) 10/03/2020    Chronic diastolic congestive heart failure ([place] [address] ) 10/03/2020    Cellulitis 10/03/2020    Gastroesophageal reflux disease without esophagitis 10/03/2020    Hx MRSA infection 2020    Left ovarian cyst 2017    Depression with anxiety 07/15/2017    Bilateral lower extremity edema 2017    Chronic pain disorder 2017    Anemia 2016    Ambulatory dysfunction 2016    Stage 3a chronic kidney disease ([place] [address] ) 2016    Adjustment disorder 2013    Renal insufficiency 2013    Irritable bowel syndrome 2012    Left atrial enlargement 2012    Left ventricular hypertrophy 2012    Carpal tunnel syndrome 2012    Gout 2012    Hyperlipidemia 2012    Osteoarthritis 2012    Symptomatic menopausal or female climacteric states 2012      LOS (days): 1  Geometric Mean LOS (GMLOS) (days): 3 80  Days to GMLOS:2 8     OBJECTIVE:  Risk of Unplanned Readmission Score: 24         Current admission status: Inpatient   Preferred Pharmacy:   [address] [place], [place] - [facility]  [facility]  [place] [address]  Phone: [phone] Fax: [phone] [address] [facility], [address]  [address]  [facility] [address]  Phone: [phone] Fax: [phone]    Primary Care Provider: ORACIO Clinton    Primary Insurance: MEDICARE  Secondary Insurance: AARP    DISCHARGE DETAILS:    Discharge planning discussed with[de-identified] patient  Freedom of Choice: Yes  Comments - Freedom of Choice: Pt will be stable for d/c today per Dr Tobin Harman  Pt aware and in agreement  Per pt her CG will be at pt home at 1600  BLS transport set for  via [employer]  Medical necessity for transport completed and placed in chart  Pt also aware and in agreement  Dr Tobin Harman aware  Requested [address] [facility] [address]         Is the patient interested in [address] at discharge?: No    DME Referral Provided  Referral made for DME?: No         Would you like to participate in our [address] service program?  : No - Declined    Treatment Team Recommendation: Home with [address] [employer]  Discharge Destination Plan[de-identified] Home with Daniel at Discharge : BLS Ambulance        [facility] by Elida and [facility]  ETA of Transport (Date): 04/20/22  ETA of Transport (Time): [phone]

## 2022-04-20 NOTE — DISCHARGE SUMMARY
[address]  Discharge- Veronika Shetty 1950, 80 y o  female MRN: [id number]  Unit/Bed#: -01 Encounter: [id number]  Primary Care Provider: ORACIO Ovalles   Date and time admitted to hospital: 4/18/2022  1:05 PM    * C  difficile colitis  Assessment & Plan  · Patient presented to the ED with complaints of severe persistent diarrhea  · History of c-diff infection in January  · Completed a 10 day course of oral Vancomycin  · C diff positive again on 02/18/2022  · Will continue supportive care   · Advance diet as tolerated  · Oral vancomycin 125 mg q6 hours while inpatient with extended taper course as an outpatient    Paroxysmal atrial fibrillation ([facility] )  Assessment & Plan  · Patient is currently rate controlled  · Continue home Coumadin regimen  · Daily PT/INR    Cellulitis  Assessment & Plan  · Erythema noted on left hip  · Patient does have a history of open wounds  · No leukocytosis  · Patient is afebrile  · Will start oral doxycycline x7 days ; prescription sent to patient's pharmacy    Chronic diastolic congestive heart failure ([facility] )  Assessment & Plan  · Does not appear to be in acute decompensation  · Hold home Lasix while patient is experiencing diarrhea      Other specified hypothyroidism  Assessment & Plan  · Continue home levothyroxine 125 mcg PO daily    Gastroesophageal reflux disease without esophagitis  Assessment & Plan  · Recent EGD with no signs of H pylori  · Continue home PPI    Mild episode of recurrent major depressive disorder ([facility] )  Assessment & Plan  · Continue home Cymbalta    Class 3 severe obesity due to excess calories with body mass index (BMI) greater than or equal to 70 in adult [facility])  Assessment & Plan  · Present on admission as evidenced by BMI of 69  · Carbohydrate controlled diet  · Encourage lifestyle modifications and weight loss    Idiopathic chronic 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  Assessment & Plan  · Continue home allopurinol    Discharging Physician / Practitioner: Larry Fontenot DO  PCP: Ellen Sanches  Admission Date:   Admission Orders (From admission, onward)     Ordered        04/19/22 0908  Inpatient Admission  Once            04/18/22 1647  Place in Observation  Once                      Discharge Date: 04/20/22    Medical Problems             Resolved Problems  Date Reviewed: 4/20/2022    None                Consultations During Hospital Stay:  Wound Care Nurse    Procedures Performed:  Not applicable    Significant Findings / Test Results:     CT head without contrast    Result Date: 4/18/2022  Impression: No acute intracranial abnormality  Workstation performed: [id number]       Incidental Findings:  Not applicable    Test Results Pending at Discharge (will require follow up): Not applicable     Outpatient Tests Requested: Follow-up CBC/BMP/INR    Reason for Admission:  Diarrhea    Hospital Course:     Jorge Matthew is a 80 y o  female with a past medical history significant for atrial fibrillation on Coumadin, GERD, hypothyroidism, anxiety with depression, recurrent C diff colitis who presents with complaints of severe, persistent diarrhea  The patient completed a 10 day course of oral vancomycin however tested positive for C diff on 04/15/2022  The patient states she has been experiencing very smelly diarrhea  In the ED, all vital signs were found to be stable  Laboratory analysis grossly unremarkable  The patient was assigned to observation in the setting of recurrent C diff colitis  The patient's diarrhea improved with initiation of oral vancomycin  She was continued on a 10 day course of oral vancomycin and was discharged with a prescription  A 7 day course of oral doxycycline was also sent to the patient's pharmacy in the setting of cellulitis  The patient has Wound Care Nursing capabilities at home in place    She was strongly encouraged to resume all preadmission medications at all preadmission dosages  She was encouraged to take the full course of oral vancomycin and oral doxycycline in the setting of C diff colitis and cellulitis respectively  Transportation was arranged by Case Management  Wound care nurse saw the patient prior to discharge  She was strongly encouraged to follow-up with her PCP within 7-14 days  Condition at Discharge: stable     Discharge Day Visit / Exam:     Subjective:  Patient seen and examined at bedside  No acute events overnight  Denies chest pain, shortness of breath, diaphoresis  Endorses improving diarrhea  States her wounds are open and draining  Does appear to have possible cellulitis so will discharge on oral doxycycline  Vitals: Blood Pressure: 120/61 (04/20/22 0759)  Pulse: 65 (04/20/22 0759)  Temperature: 98 °F (36 7 °C) (04/20/22 0759)  Temp Source: Oral (04/19/22 2230)  Respirations: 18 (04/20/22 0759)  SpO2: 93 % (04/20/22 0759)     Exam:   Physical Exam  Vitals and nursing note reviewed  Constitutional:       General: She is not in acute distress  Appearance: She is well-developed  She is obese  HENT:      Head: Normocephalic and atraumatic  Eyes:      Conjunctiva/sclera: Conjunctivae normal    Cardiovascular:      Rate and Rhythm: Normal rate and regular rhythm  Heart sounds: No murmur heard  Pulmonary:      Effort: Pulmonary effort is normal  No respiratory distress  Breath sounds: Normal breath sounds  Abdominal:      Palpations: Abdomen is soft  Tenderness: There is no abdominal tenderness  Musculoskeletal:      Cervical back: Neck supple  Skin:     General: Skin is warm and dry  Neurological:      Mental Status: She is alert  Discharge instructions/Information to patient and family:   See after visit summary for information provided to patient and family        Provisions for Follow-Up Care:  See after visit summary for information related to follow-up care and any pertinent home health orders  Disposition:     Home with home health care  Oral vancomycin and doxycycline sent to patient's pharmacy  Case management arranging transportation  Follow-up with PCP within 7-14 days     Discharge Statement:  I spent 60 minutes discharging the patient  This time was spent on the day of discharge  I had direct contact with the patient on the day of discharge  Greater than 50% of the total time was spent examining patient, answering all patient questions, arranging and discussing plan of care with patient as well as directly providing post-discharge instructions  Additional time then spent on discharge activities  Discharge Medications:  See after visit summary for reconciled discharge medications provided to patient and family        ** Please Note: This note has been constructed using a voice recognition system **

## 2022-04-23 LAB
BACTERIA BLD CULT: NORMAL
BACTERIA BLD CULT: NORMAL

## 2022-05-04 ENCOUNTER — TELEPHONE (OUTPATIENT)
Dept: OTHER | Facility: OTHER | Age: 72
End: 2022-05-04

## 2022-05-04 ENCOUNTER — OFFICE VISIT (OUTPATIENT)
Dept: GASTROENTEROLOGY | Facility: CLINIC | Age: 72
End: 2022-05-04
Payer: MEDICARE

## 2022-05-04 VITALS — DIASTOLIC BLOOD PRESSURE: 90 MMHG | SYSTOLIC BLOOD PRESSURE: 115 MMHG

## 2022-05-04 DIAGNOSIS — K21.9 GASTROESOPHAGEAL REFLUX DISEASE, UNSPECIFIED WHETHER ESOPHAGITIS PRESENT: ICD-10-CM

## 2022-05-04 DIAGNOSIS — A04.72 C. DIFFICILE COLITIS: Primary | ICD-10-CM

## 2022-05-04 PROCEDURE — 99214 OFFICE O/P EST MOD 30 MIN: CPT | Performed by: NURSE PRACTITIONER

## 2022-05-04 NOTE — PHYSICIAN ADVISOR
Current patient class: Inpatient  The patient is currently on Hospital Day: 3 at [place] [address]      The patient was admitted to the hospital at  9:08 AM on 4/19/22 for the following diagnosis:  Generalized abdominal pain [R10 84]  C  difficile colitis [A04 72]  Headache [R51 9]       There is documentation in the medical record of an expected length of stay of at least 2 midnights  The patient is therefore expected to satisfy the 2 midnight benchmark and given the 2 midnight presumption is appropriate for INPATIENT ADMISSION  Given this expectation of a satisfying stay, CMS instructs us that the patient is most often appropriate for inpatient admission under part A provided medical necessity is documented in the chart  After review of the relevant documentation, labs, vital signs and test results, the patient is appropriate for INPATIENT ADMISSION  Admission to the hospital as an inpatient is a complex decision making process which requires the practitioner to consider the patients presenting complaint, history and physical examination and all relevant testing  With this in mind, in this case, the patient was deemed appropriate for INPATIENT ADMISSION  After review of the documentation and testing available at the time of the admission I concur with this clinical determination of medical necessity  Rationale is as follows: The patient is a 80 yrs old Female who presented to the ED at 4/18/2022  1:05 PM with a chief complaint of Abdominal Pain (lower abdominal pain onset several days ago  patient sent Stool sample for r/o cdiff per PCP however has not recieved results yet  ) and Diarrhea Pt with h/o recurrent c diff colitis and morbid obesity with BMI 68 7  She re-tested positive SHe was started on PO vancomycin  Her stool output decreased with start of vancomycin  She was also noted to have cellulitis of L hip with h/o open wounds  She was started on doxycycline    Given her co-morbidities with recurrent c diff, ip seems appropriate             ED Triage Vitals   Temperature Pulse Respirations Blood Pressure SpO2   04/19/22 0835 04/18/22 1309 04/18/22 1309 04/18/22 1309 04/18/22 1309   97 8 °F (36 6 °C) 61 19 111/62 100 %      Temp Source Heart Rate Source Patient Position - Orthostatic VS BP Location FiO2 (%)   04/19/22 0835 04/18/22 1309 04/18/22 1309 04/18/22 1309 --   Oral Monitor Sitting Left arm       Pain Score       04/18/22 1309       7           Past Medical History:   Diagnosis Date    Atrial fibrillation (HCC)     Bladder stone     C  difficile colitis     Cellulitis     CHF (congestive heart failure) ([facility] )     Depression with anxiety     Disease of thyroid gland     Diverticulitis     Enterocolitis     History of abnormal cervical Pap smear     Kidney stone     Lymphedema     Menopause     Age 53    Morbid obesity with BMI of 70 and over, adult ([facility] )     MRSA (methicillin resistant Staphylococcus aureus)     abdominal wound    Osteoarthritis     Phlebitis     left lower leg    Spondylolysis      Past Surgical History:   Procedure Laterality Date    APPENDECTOMY      CARPAL TUNNEL RELEASE      CHOLECYSTECTOMY      CYSTOSCOPY      stent    FOOT SURGERY  1982    bone spur    KNEE SURGERY      WISDOM TOOTH EXTRACTION             Consults have been placed to:   IP CONSULT TO CASE MANAGEMENT    Vitals:    04/19/22 1700 04/19/22 1701 04/19/22 2230 04/20/22 0759   BP: 109/64 109/64 112/55 120/61   BP Location:       Pulse: 59 62 65 65   Resp:  19 18 18   Temp: 98 3 °F (36 8 °C) 98 3 °F (36 8 °C) 98 4 °F (36 9 °C) 98 °F (36 7 °C)   TempSrc:   Oral    SpO2: 96% 95% 93% 93%       Most recent labs:    No results for input(s): WBC, HGB, HCT, PLT, K, NA, CALCIUM, BUN, CREATININE, LIPASE, AMYLASE, INR, TROPONINI, CKTOTAL, AST, ALT, ALKPHOS, BILITOT in the last 72 hours      Scheduled Meds:  Continuous Infusions:No current facility-administered medications for this encounter  PRN Meds:      Surgical procedures (if appropriate):

## 2022-05-04 NOTE — PROGRESS NOTES
[facility] - Outpatient Follow-up Note  Dian Hunter 80 y o  female MRN: [id number]  Encounter: [id number]          ASSESSMENT AND PLAN:      1  C  difficile colitis    Patient has a history of multiple episodes of C diff colitis  She was recently seen in the hospital in January and discharged on a 40 day course of vancomycin which she reports she had just finished yesterday  Would recommend a dose of rifaximin to follow vancomycin  Patient is agreeable to this trial     - rifaximin (XIFAXAN) 200 mg tablet; Take 2 tablets (400 mg total) by mouth 3 (three) times a day for 20 days  Dispense: 120 tablet; Refill: 0    2  Gastroesophageal reflux disease, unspecified whether esophagitis present    Patient had complaints of epigastric abdominal pain, heartburn, odynophagia and nausea at last visit and was recommended to start omeprazole daily and undergo EGD  Patient's EGD revealed a normal appearing duodenum and esophagus with mild abnormal mucosa with erosion in the body of the stomach and antrum as well as a hiatal hernia  Her biopsies were normal   Patient reports that she was unable to tolerate the omeprazole so stopped taking it but does report that she has had improvement in her symptoms due to watching her diet  Encouraged patient to continue GERD diet modifications  - GERD diet and lifestyle modifications    Will see patient back in 6 months or sooner if needed  ______________________________________________________________________    SUBJECTIVE:  Dian Hunter is a 80-year-old female that presents today on a litter with medical transport  Patient had recently undergone EGD due to epigastric abdominal pain, heartburn, odynophagia and nausea  Patient's EGD revealed a normal appearing duodenum and esophagus with mild abnormal mucosa with erosion in the body of the stomach and antrum as well as a hiatal hernia    Biopsies were normal   Patient reports that she stopped taking the omeprazole as she felt that it gave her more heartburn  She states that she has been watching her diet recently and has had improvement in her symptoms  She does report however recently being seen in the hospital with diarrhea and was given a 40 day course of vancomycin which ended yesterday  She reports that her stools are not yet formed but have improved  REVIEW OF SYSTEMS:  Review of Systems   Gastrointestinal: Negative for abdominal distention, anal bleeding, blood in stool, constipation, diarrhea, nausea, rectal pain and vomiting  Abdominal pain: Occasional tenderness  Musculoskeletal: Positive for gait problem ( presented on litter, unable to sit in a chair)  All other systems reviewed and are negative          Historical Information   Past Medical History:   Diagnosis Date    Atrial fibrillation ([facility] )     Bladder stone     C  difficile colitis     Cellulitis     CHF (congestive heart failure) ([facility] )     Depression with anxiety     Disease of thyroid gland     Diverticulitis     Enterocolitis     History of abnormal cervical Pap smear     Kidney stone     Lymphedema     Menopause     Age 53    Morbid obesity with BMI of 70 and over, adult ([facility] )     MRSA (methicillin resistant Staphylococcus aureus)     abdominal wound    Osteoarthritis     Phlebitis     left lower leg    Spondylolysis      Past Surgical History:   Procedure Laterality Date    APPENDECTOMY      CARPAL TUNNEL RELEASE      CHOLECYSTECTOMY      CYSTOSCOPY      stent    FOOT SURGERY  1982    bone spur    KNEE SURGERY      WISDOM TOOTH EXTRACTION       Social History   Social History     Substance and Sexual Activity   Alcohol Use Not Currently     Social History     Substance and Sexual Activity   Drug Use Not Currently    Comment: As a teen - As per [employer]      Social History     Tobacco Use   Smoking Status Former Smoker   Smokeless Tobacco Never Used   Tobacco Comment    5 packs/day until 6 (age 16-20) - As per [employer]      Family History   Problem Relation Age of Onset    Heart failure Mother     Heart disease Mother     Lymphoma Mother     Kidney disease Father     Heart disease Father     Heart failure Father     Diabetes type II Father     Diabetes type II Sister     Diabetes type II Brother     Uterine cancer Paternal Aunt     Breast cancer Neg Hx     Colon cancer Neg Hx     Ovarian cancer Neg Hx        Meds/Allergies       Current Outpatient Medications:     acetaminophen (TYLENOL) 650 mg CR tablet    allopurinol (ZYLOPRIM) 100 mg tablet    DULoxetine (CYMBALTA) 20 mg capsule    furosemide (LASIX) 40 mg tablet    levothyroxine 125 mcg tablet    nystatin (MYCOSTATIN) powder    rifaximin (XIFAXAN) 200 mg tablet    triamcinolone (KENALOG) 0 1 % cream    warfarin (COUMADIN) 2 mg tablet    warfarin (COUMADIN) 5 mg tablet    Allergies   Allergen Reactions    Augmentin Es-600  [Amoxicillin-Pot Clavulanate]     Penicillins Other (See Comments)     Tolerates cefepime (11/18/21)    Sulfa Antibiotics Hives    Vitamin C [Ascorbate - Food Allergy]     Latex Rash and Edema           Objective     Blood pressure 115/90  There is no height or weight on file to calculate BMI  PHYSICAL EXAM:      General Appearance:   Alert, cooperative, no distress   HEENT:   Normocephalic, atraumatic, anicteric  Neck:  Supple, symmetrical, trachea midline   Lungs:   Clear to auscultation bilaterally; no rales, rhonchi or wheezing; respirations unlabored    Heart[de-identified]   Regular rate and rhythm; no murmur, rub, or gallop  Abdomen:   Soft, non-tender, non-distended; normal bowel sounds; no masses, no organomegaly    Genitalia:   Deferred    Rectal:   Deferred    Extremities:  No cyanosis, clubbing or edema    Skin:  No jaundice, rashes, or lesions             Lab Results:   No visits with results within 1 Day(s) from this visit     Latest known visit with results is:   Admission on 04/18/2022, Discharged on 04/20/2022   Component Date Value    WBC 04/18/2022 8 91     RBC 04/18/2022 4 39     Hemoglobin 04/18/2022 10 9*    Hematocrit 04/18/2022 37 8     MCV 04/18/2022 86     MCH 04/18/2022 24 8*    MCHC 04/18/2022 28 8*    RDW 04/18/2022 16 8*    MPV 04/18/2022 8 5*    Platelets 83/08/1843 448*    nRBC 04/18/2022 0     Neutrophils Relative 04/18/2022 75     Immat GRANS % 04/18/2022 0     Lymphocytes Relative 04/18/2022 15     Monocytes Relative 04/18/2022 6     Eosinophils Relative 04/18/2022 3     Basophils Relative 04/18/2022 1     Neutrophils Absolute 04/18/2022 6 69     Immature Grans Absolute 04/18/2022 0 03     Lymphocytes Absolute 04/18/2022 1 37     Monocytes Absolute 04/18/2022 0 51     Eosinophils Absolute 04/18/2022 0 26     Basophils Absolute 04/18/2022 0 05     Sodium 04/18/2022 139     Potassium 04/18/2022 3 9     Chloride 04/18/2022 98     CO2 04/18/2022 36*    ANION GAP 04/18/2022 5     BUN 04/18/2022 25     Creatinine 04/18/2022 0 94     Glucose 04/18/2022 103     Calcium 04/18/2022 8 9     Corrected Calcium 04/18/2022 9 6     AST 04/18/2022 16     ALT 04/18/2022 12     Alkaline Phosphatase 04/18/2022 118*    Total Protein 04/18/2022 7 9     Albumin 04/18/2022 3 1*    Total Bilirubin 04/18/2022 0 66     eGFR 04/18/2022 61     Lipase 04/18/2022 30     Blood Culture 04/18/2022 No Growth After 5 Days   Blood Culture 04/18/2022 No Growth After 5 Days       LACTIC ACID 04/18/2022 1 2     Protime 04/18/2022 24 2*    INR 04/18/2022 2 24*    Sodium 04/19/2022 139     Potassium 04/19/2022 3 6     Chloride 04/19/2022 101     CO2 04/19/2022 32     ANION GAP 04/19/2022 6     BUN 04/19/2022 23     Creatinine 04/19/2022 0 98     Glucose 04/19/2022 111     Glucose, Fasting 04/19/2022 111*    Calcium 04/19/2022 8 6     eGFR 04/19/2022 58     WBC 04/19/2022 9 35     RBC 04/19/2022 4 12     Hemoglobin 04/19/2022 10 2*    Hematocrit 04/19/2022 35 0     MCV 04/19/2022 85     MCH 04/19/2022 24 8*    MCHC 04/19/2022 29 1*    RDW 04/19/2022 16 8*    MPV 04/19/2022 8 4*    Platelets 43/78/1306 424*    nRBC 04/19/2022 0     Neutrophils Relative 04/19/2022 75     Immat GRANS % 04/19/2022 0     Lymphocytes Relative 04/19/2022 15     Monocytes Relative 04/19/2022 6     Eosinophils Relative 04/19/2022 3     Basophils Relative 04/19/2022 1     Neutrophils Absolute 04/19/2022 7 06     Immature Grans Absolute 04/19/2022 0 02     Lymphocytes Absolute 04/19/2022 1 39     Monocytes Absolute 04/19/2022 0 57     Eosinophils Absolute 04/19/2022 0 26     Basophils Absolute 04/19/2022 0 05     WBC 04/20/2022 8 40     RBC 04/20/2022 4 06     Hemoglobin 04/20/2022 10 0*    Hematocrit 04/20/2022 34 8     MCV 04/20/2022 86     MCH 04/20/2022 24 6*    MCHC 04/20/2022 28 7*    RDW 04/20/2022 16 7*    MPV 04/20/2022 8 6*    Platelets 18/51/2399 415*    nRBC 04/20/2022 0     Neutrophils Relative 04/20/2022 73     Immat GRANS % 04/20/2022 0     Lymphocytes Relative 04/20/2022 17     Monocytes Relative 04/20/2022 6     Eosinophils Relative 04/20/2022 3     Basophils Relative 04/20/2022 1     Neutrophils Absolute 04/20/2022 6 15     Immature Grans Absolute 04/20/2022 0 02     Lymphocytes Absolute 04/20/2022 1 40     Monocytes Absolute 04/20/2022 0 51     Eosinophils Absolute 04/20/2022 0 27     Basophils Absolute 04/20/2022 0 05     Sodium 04/20/2022 138     Potassium 04/20/2022 3 7     Chloride 04/20/2022 102     CO2 04/20/2022 28     ANION GAP 04/20/2022 8     BUN 04/20/2022 24     Creatinine 04/20/2022 0 95     Glucose 04/20/2022 111     Calcium 04/20/2022 8 7     eGFR 04/20/2022 60     Magnesium 04/20/2022 1 9     Phosphorus 04/20/2022 3 3          Radiology Results:   EGD    Result Date: 4/4/2022  Narrative: [place] [id number] [phone] DATE OF SERVICE: 4/04/22 PHYSICIAN(S): Attending: Bettye Long MD Fellow: No Staff Documented INDICATION: Epigastric abdominal pain, Gastroesophageal reflux disease, unspecified whether esophagitis present POST-OP DIAGNOSIS: See the impression below  PREPROCEDURE: Informed consent was obtained for the procedure, including sedation  Risks of perforation, hemorrhage, adverse drug reaction and aspiration were discussed  The patient was placed in the left lateral decubitus position  Patient was explained about the risks and benefits of the procedure  Risks including but not limited to bleeding, infection, and perforation were explained in detail  Also explained about less than 100% sensitivity with the exam and other alternatives  DETAILS OF PROCEDURE: Patient was taken to the procedure room where a time out was performed to confirm correct patient and correct procedure  The patient underwent monitored anesthesia care, which was administered by an anesthesia professional  The patient's blood pressure, heart rate, level of consciousness, respirations, oxygen and ETCO2 were monitored throughout the procedure  The scope was advanced to the second part of the duodenum  Retroflexion was performed in the fundus  The patient experienced no blood loss  The procedure was not difficult  The patient tolerated the procedure well  There were no apparent complications   ANESTHESIA INFORMATION: ASA: IV Anesthesia Type: IV Sedation with Anesthesia MEDICATIONS: No administrations occurring from 1129 to 1143 on 04/04/22 FINDINGS: The duodenal bulb and 2nd part of the duodenum appeared normal  Mild abnormal mucosa with erosion in the body of the stomach and antrum; performed cold forceps biopsy Hiatal hernia - GE junction 44 cm from the incisors, diaphragmatic impression 46 cm from the incisors Otherwise normal stomach The upper third of the esophagus, middle third of the esophagus and lower third of the esophagus appeared normal  Performed random biopsy  SPECIMENS: ID Type Source Tests Collected by Time Destination 1 : r/o HP Tissue Stomach TISSUE EXAM Arron Marie MD 4/4/2022 11:35 AM  2 : r/o EOE Tissue Esophagus TISSUE EXAM Arron Marie MD 4/4/2022 11:38 AM      Impression: The duodenal bulb and 2nd part of the duodenum appeared normal  Mild abnormal mucosa with erosion in the body of the stomach and antrum; performed cold forceps biopsy Hiatal hernia - GE junction 44 cm from the incisors, diaphragmatic impression 46 cm from the incisors Otherwise normal stomach The upper third of the esophagus, middle third of the esophagus and lower third of the esophagus appeared normal  Performed random biopsy  RECOMMENDATION: Await pathology results Ok to resume coumadin tonight   Arron Marie MD     CT head without contrast    Result Date: 4/18/2022  Narrative: CT BRAIN - WITHOUT CONTRAST INDICATION:   Headache, new or worsening (Age >= 50y) Headache on Coumadin  COMPARISON:  None  TECHNIQUE:  CT examination of the brain was performed  In addition to axial images, sagittal and coronal 2D reformatted images were created and submitted for interpretation  Radiation dose length product (DLP) for this visit:  917 mGy-cm   This examination, like all CT scans performed in the [facility], was performed utilizing techniques to minimize radiation dose exposure, including the use of iterative reconstruction and automated exposure control  IMAGE QUALITY:  Diagnostic  FINDINGS: PARENCHYMA:  No intracranial mass, mass effect or midline shift  No CT signs of acute infarction  No acute parenchymal hemorrhage  VENTRICLES AND EXTRA-AXIAL SPACES:  Normal for the patient's age  VISUALIZED ORBITS AND PARANASAL SINUSES:  Unremarkable  CALVARIUM AND EXTRACRANIAL SOFT TISSUES:  Normal      Impression: No acute intracranial abnormality   Workstation performed: [id number]

## 2022-05-04 NOTE — TELEPHONE ENCOUNTER
Pt  Called to say the ambulance will be there shortly to pick her up  They were running a little late  She will be there for her appointment  Possibly 5 mins late

## 2022-05-05 ENCOUNTER — TELEPHONE (OUTPATIENT)
Dept: SURGERY | Facility: CLINIC | Age: 72
End: 2022-05-05

## 2022-05-05 ENCOUNTER — TELEPHONE (OUTPATIENT)
Dept: GASTROENTEROLOGY | Facility: CLINIC | Age: 72
End: 2022-05-05

## 2022-05-05 NOTE — TELEPHONE ENCOUNTER
Patients GI provider:  Dakotah Degroot    Number to return call: ([phone])    Reason for call: Pt calling because she was seen yesterday by Ubaldo Gutierrez and was prescribed Xifaxin  Her insurance won't cover and it will cost her over $2,000  Please call patient, thank you!     Scheduled procedure/appointment date if applicable: Apt/procedure

## 2022-05-06 ENCOUNTER — HOSPITAL ENCOUNTER (EMERGENCY)
Facility: HOSPITAL | Age: 72
End: 2022-05-06
Attending: EMERGENCY MEDICINE
Payer: MEDICARE

## 2022-05-06 ENCOUNTER — APPOINTMENT (EMERGENCY)
Dept: CT IMAGING | Facility: HOSPITAL | Age: 72
End: 2022-05-06
Payer: MEDICARE

## 2022-05-06 ENCOUNTER — HOSPITAL ENCOUNTER (INPATIENT)
Facility: HOSPITAL | Age: 72
LOS: 7 days | Discharge: HOME HEALTH | DRG: 603 | End: 2022-05-13
Attending: EMERGENCY MEDICINE | Admitting: INTERNAL MEDICINE
Payer: MEDICARE

## 2022-05-06 ENCOUNTER — APPOINTMENT (EMERGENCY)
Dept: CT IMAGING | Facility: HOSPITAL | Age: 72
DRG: 603 | End: 2022-05-06
Payer: MEDICARE

## 2022-05-06 VITALS
HEART RATE: 83 BPM | OXYGEN SATURATION: 93 % | HEIGHT: 61 IN | BODY MASS INDEX: 55.32 KG/M2 | SYSTOLIC BLOOD PRESSURE: 113 MMHG | TEMPERATURE: 97.3 F | RESPIRATION RATE: 18 BRPM | WEIGHT: 293 LBS | DIASTOLIC BLOOD PRESSURE: 53 MMHG

## 2022-05-06 DIAGNOSIS — D72.829 LEUKOCYTOSIS: ICD-10-CM

## 2022-05-06 DIAGNOSIS — R10.32 LEFT LOWER QUADRANT ABDOMINAL PAIN: ICD-10-CM

## 2022-05-06 DIAGNOSIS — Z86.19 HISTORY OF CLOSTRIDIOIDES DIFFICILE COLITIS: ICD-10-CM

## 2022-05-06 DIAGNOSIS — R11.2 NAUSEA & VOMITING: ICD-10-CM

## 2022-05-06 DIAGNOSIS — L03.90 CELLULITIS: Primary | ICD-10-CM

## 2022-05-06 DIAGNOSIS — R10.84 GENERALIZED ABDOMINAL PAIN: Primary | ICD-10-CM

## 2022-05-06 LAB
4HR DELTA HS TROPONIN: -6 NG/L
ALBUMIN SERPL BCP-MCNC: 2.9 G/DL (ref 3.5–5)
ALP SERPL-CCNC: 115 U/L (ref 34–104)
ALT SERPL W P-5'-P-CCNC: 12 U/L (ref 7–52)
ANION GAP SERPL CALCULATED.3IONS-SCNC: 8 MMOL/L (ref 4–13)
APTT PPP: 59 SECONDS (ref 23–37)
AST SERPL W P-5'-P-CCNC: 20 U/L (ref 13–39)
BASOPHILS # BLD MANUAL: 0 THOUSAND/UL (ref 0–0.1)
BASOPHILS NFR MAR MANUAL: 0 % (ref 0–1)
BILIRUB SERPL-MCNC: 1.46 MG/DL (ref 0.2–1)
BUN SERPL-MCNC: 20 MG/DL (ref 5–25)
CALCIUM ALBUM COR SERPL-MCNC: 9.5 MG/DL (ref 8.3–10.1)
CALCIUM SERPL-MCNC: 8.6 MG/DL (ref 8.4–10.2)
CARDIAC TROPONIN I PNL SERPL HS: 15 NG/L
CARDIAC TROPONIN I PNL SERPL HS: 9 NG/L
CHLORIDE SERPL-SCNC: 96 MMOL/L (ref 96–108)
CO2 SERPL-SCNC: 30 MMOL/L (ref 21–32)
CREAT SERPL-MCNC: 1.15 MG/DL (ref 0.6–1.3)
EOSINOPHIL # BLD MANUAL: 0 THOUSAND/UL (ref 0–0.4)
EOSINOPHIL NFR BLD MANUAL: 0 % (ref 0–6)
ERYTHROCYTE [DISTWIDTH] IN BLOOD BY AUTOMATED COUNT: 16.9 % (ref 11.6–15.1)
FLUAV RNA RESP QL NAA+PROBE: NEGATIVE
FLUBV RNA RESP QL NAA+PROBE: NEGATIVE
GFR SERPL CREATININE-BSD FRML MDRD: 48 ML/MIN/1.73SQ M
GLUCOSE SERPL-MCNC: 146 MG/DL (ref 65–140)
HCT VFR BLD AUTO: 39.6 % (ref 34.8–46.1)
HGB BLD-MCNC: 11.4 G/DL (ref 11.5–15.4)
INR PPP: 2.72 (ref 0.84–1.19)
LACTATE SERPL-SCNC: 1 MMOL/L (ref 0.5–2)
LIPASE SERPL-CCNC: 20 U/L (ref 11–82)
LYMPHOCYTES # BLD AUTO: 0.39 THOUSAND/UL (ref 0.6–4.47)
LYMPHOCYTES # BLD AUTO: 2 % (ref 14–44)
MCH RBC QN AUTO: 24.4 PG (ref 26.8–34.3)
MCHC RBC AUTO-ENTMCNC: 28.8 G/DL (ref 31.4–37.4)
MCV RBC AUTO: 85 FL (ref 82–98)
MONOCYTES # BLD AUTO: 0.78 THOUSAND/UL (ref 0–1.22)
MONOCYTES NFR BLD: 4 % (ref 4–12)
NEUTROPHILS # BLD MANUAL: 18.22 THOUSAND/UL (ref 1.85–7.62)
NEUTS BAND NFR BLD MANUAL: 1 % (ref 0–8)
NEUTS SEG NFR BLD AUTO: 93 % (ref 43–75)
PLATELET # BLD AUTO: 402 THOUSANDS/UL (ref 149–390)
PLATELET BLD QL SMEAR: ABNORMAL
PMV BLD AUTO: 8.8 FL (ref 8.9–12.7)
POTASSIUM SERPL-SCNC: 4 MMOL/L (ref 3.5–5.3)
PROT SERPL-MCNC: 7.5 G/DL (ref 6.4–8.4)
PROTHROMBIN TIME: 28.1 SECONDS (ref 11.6–14.5)
RBC # BLD AUTO: 4.67 MILLION/UL (ref 3.81–5.12)
RBC MORPH BLD: NORMAL
RSV RNA RESP QL NAA+PROBE: NEGATIVE
SARS-COV-2 RNA RESP QL NAA+PROBE: NEGATIVE
SODIUM SERPL-SCNC: 134 MMOL/L (ref 135–147)
WBC # BLD AUTO: 19.38 THOUSAND/UL (ref 4.31–10.16)

## 2022-05-06 PROCEDURE — 87154 CUL TYP ID BLD PTHGN 6+ TRGT: CPT | Performed by: PHYSICIAN ASSISTANT

## 2022-05-06 PROCEDURE — 87077 CULTURE AEROBIC IDENTIFY: CPT | Performed by: PHYSICIAN ASSISTANT

## 2022-05-06 PROCEDURE — 99285 EMERGENCY DEPT VISIT HI MDM: CPT

## 2022-05-06 PROCEDURE — 87040 BLOOD CULTURE FOR BACTERIA: CPT | Performed by: PHYSICIAN ASSISTANT

## 2022-05-06 PROCEDURE — 85730 THROMBOPLASTIN TIME PARTIAL: CPT | Performed by: PHYSICIAN ASSISTANT

## 2022-05-06 PROCEDURE — 80053 COMPREHEN METABOLIC PANEL: CPT | Performed by: PHYSICIAN ASSISTANT

## 2022-05-06 PROCEDURE — 83605 ASSAY OF LACTIC ACID: CPT | Performed by: PHYSICIAN ASSISTANT

## 2022-05-06 PROCEDURE — 85007 BL SMEAR W/DIFF WBC COUNT: CPT | Performed by: PHYSICIAN ASSISTANT

## 2022-05-06 PROCEDURE — 84484 ASSAY OF TROPONIN QUANT: CPT | Performed by: PHYSICIAN ASSISTANT

## 2022-05-06 PROCEDURE — 96361 HYDRATE IV INFUSION ADD-ON: CPT

## 2022-05-06 PROCEDURE — 85027 COMPLETE CBC AUTOMATED: CPT | Performed by: PHYSICIAN ASSISTANT

## 2022-05-06 PROCEDURE — 99284 EMERGENCY DEPT VISIT MOD MDM: CPT | Performed by: INTERNAL MEDICINE

## 2022-05-06 PROCEDURE — 96374 THER/PROPH/DIAG INJ IV PUSH: CPT

## 2022-05-06 PROCEDURE — 99223 1ST HOSP IP/OBS HIGH 75: CPT | Performed by: PHYSICIAN ASSISTANT

## 2022-05-06 PROCEDURE — 87186 SC STD MICRODIL/AGAR DIL: CPT | Performed by: PHYSICIAN ASSISTANT

## 2022-05-06 PROCEDURE — G1004 CDSM NDSC: HCPCS

## 2022-05-06 PROCEDURE — 85610 PROTHROMBIN TIME: CPT | Performed by: PHYSICIAN ASSISTANT

## 2022-05-06 PROCEDURE — 93005 ELECTROCARDIOGRAM TRACING: CPT

## 2022-05-06 PROCEDURE — 99285 EMERGENCY DEPT VISIT HI MDM: CPT | Performed by: EMERGENCY MEDICINE

## 2022-05-06 PROCEDURE — 83690 ASSAY OF LIPASE: CPT | Performed by: PHYSICIAN ASSISTANT

## 2022-05-06 PROCEDURE — 74176 CT ABD & PELVIS W/O CONTRAST: CPT

## 2022-05-06 PROCEDURE — 36415 COLL VENOUS BLD VENIPUNCTURE: CPT | Performed by: PHYSICIAN ASSISTANT

## 2022-05-06 PROCEDURE — 0241U HB NFCT DS VIR RESP RNA 4 TRGT: CPT | Performed by: PHYSICIAN ASSISTANT

## 2022-05-06 PROCEDURE — 99285 EMERGENCY DEPT VISIT HI MDM: CPT | Performed by: PHYSICIAN ASSISTANT

## 2022-05-06 RX ORDER — ONDANSETRON 2 MG/ML
4 INJECTION INTRAMUSCULAR; INTRAVENOUS ONCE
Status: COMPLETED | OUTPATIENT
Start: 2022-05-06 | End: 2022-05-06

## 2022-05-06 RX ORDER — ACETAMINOPHEN 325 MG/1
975 TABLET ORAL ONCE
Status: COMPLETED | OUTPATIENT
Start: 2022-05-06 | End: 2022-05-06

## 2022-05-06 RX ADMIN — ACETAMINOPHEN 975 MG: 325 TABLET ORAL at 12:21

## 2022-05-06 RX ADMIN — SODIUM CHLORIDE 1000 ML: 0.9 INJECTION, SOLUTION INTRAVENOUS at 12:22

## 2022-05-06 RX ADMIN — ONDANSETRON 4 MG: 2 INJECTION INTRAMUSCULAR; INTRAVENOUS at 12:21

## 2022-05-06 NOTE — ED PROVIDER NOTES
History  Chief Complaint   Patient presents with    Nausea     73-year-old female history of AFib on Coumadin, CHF, hypertension, morbid obesity, and recent hospitalization with cellulitis complicated by C diff infection presents via EMS complaining of nausea and vomiting  Patient reports since last night she has felt generally unwell and had approximately 12 episodes of nonbloody nonbilious vomiting  Patient also reports gradual onset of a generalized headache that is rather severe without any visual changes, unilateral weakness or paresthesias  She reports abdominal pain that is slightly worse than her chronic abdominal pain  Patient reports she was recently prescribed an antibiotic that she cannot recall by her GI doctor on top of the p o  Vanco she is currently taking  Per chart review this medication is rifaximin  She denies any chest pain, shortness of breath, urinary burning or frequency  She does note that she has cellulitis of her bilateral hips which is chronic  Patient reports she keeps diapers as a weep clear fluid frequently  She has not taken anything for her symptoms  She denies any other complaints or concerns at this time  Patient last moved her bowels yesterday and states that was relatively normal for her  Patient is still passing gas  Reports prior cholecystectomy and appendectomy  Patient denies any other abdominal surgeries  Prior to Admission Medications   Prescriptions Last Dose Informant Patient Reported? Taking?    DULoxetine (CYMBALTA) 20 mg capsule  Self No No   Sig: Take 1 capsule (20 mg total) by mouth daily   acetaminophen (TYLENOL) 650 mg CR tablet  Self Yes No   Sig: Take 650 mg by mouth every 8 (eight) hours   allopurinol (ZYLOPRIM) 100 mg tablet  Self No No   Sig: Take 1 tablet (100 mg total) by mouth daily   furosemide (LASIX) 40 mg tablet  Self No No   Sig: Take 1 tablet (40 mg total) by mouth daily   Patient taking differently: Take 20 mg by mouth daily as needed Prn weight gain 10 pounds    levothyroxine 125 mcg tablet  Self Yes No   Sig: Take 125 mcg by mouth daily   nystatin (MYCOSTATIN) powder  Self No No   Sig: Apply topically 2 (two) times a day   rifaximin (XIFAXAN) 200 mg tablet   No No   Sig: Take 2 tablets (400 mg total) by mouth 3 (three) times a day for 20 days   triamcinolone (KENALOG) 0 1 % cream  Self Yes No   Sig: Apply 0 1 application topically 2 (two) times a day   warfarin (COUMADIN) 2 mg tablet   No No   Sig: Take 1 tablet (2 mg total) by mouth every other day Monday, Wednesday, Friday, sunday   warfarin (COUMADIN) 5 mg tablet   No No   Sig: Take 1 tablet (5 mg total) by mouth every other day Tuesday, Thursday, saturday      Facility-Administered Medications: None       Past Medical History:   Diagnosis Date    Atrial fibrillation ([facility] )     Bladder stone     C  difficile colitis     Cellulitis     CHF (congestive heart failure) ([facility] )     Depression with anxiety     Disease of thyroid gland     Diverticulitis     Enterocolitis     History of abnormal cervical Pap smear     Kidney stone     Lymphedema     Menopause     Age 53    Morbid obesity with BMI of 70 and over, adult ([facility] )     MRSA (methicillin resistant Staphylococcus aureus)     abdominal wound    Osteoarthritis     Phlebitis     left lower leg    Spondylolysis        Past Surgical History:   Procedure Laterality Date    APPENDECTOMY      CARPAL TUNNEL RELEASE      CHOLECYSTECTOMY      CYSTOSCOPY      stent    FOOT SURGERY  1982    bone spur    KNEE SURGERY      WISDOM TOOTH EXTRACTION         Family History   Problem Relation Age of Onset    Heart failure Mother     Heart disease Mother     Lymphoma Mother     Kidney disease Father     Heart disease Father     Heart failure Father     Diabetes type II Father     Diabetes type II Sister     Diabetes type II Brother     Uterine cancer Paternal Aunt     Breast cancer Neg Hx     Colon cancer Neg Hx     Ovarian cancer Neg Hx      I have reviewed and agree with the history as documented  E-Cigarette/Vaping    E-Cigarette Use Never User      E-Cigarette/Vaping Substances    Nicotine No     THC No     CBD No     Flavoring No     Other No     Unknown No      Social History     Tobacco Use    Smoking status: Former Smoker    Smokeless tobacco: Never Used    Tobacco comment: 5 packs/day until 6 (age 17-19) - As per [employer]    Vaping Use    Vaping Use: Never used   Substance Use Topics    Alcohol use: Not Currently    Drug use: Not Currently     Comment: As a teen - As per [employer]        Review of Systems   Constitutional: Negative for chills, fatigue and fever  HENT: Negative for ear pain and sore throat  Eyes: Negative for pain  Respiratory: Negative for cough, shortness of breath and wheezing  Cardiovascular: Negative for chest pain, palpitations and leg swelling  Gastrointestinal: Positive for abdominal pain, nausea and vomiting  Negative for constipation and diarrhea  Endocrine: Negative for polyuria  Genitourinary: Negative for dysuria and pelvic pain  Musculoskeletal: Negative for arthralgias, myalgias, neck pain and neck stiffness  Skin: Positive for color change and wound  Negative for rash  Neurological: Negative for dizziness, syncope, light-headedness and headaches  All other systems reviewed and are negative  Physical Exam  Physical Exam  Constitutional:       General: She is not in acute distress  Appearance: Normal appearance  She is well-developed  She is obese  HENT:      Head: Normocephalic and atraumatic  Right Ear: External ear normal       Left Ear: External ear normal       Mouth/Throat:      Pharynx: No oropharyngeal exudate  Eyes:      Extraocular Movements: Extraocular movements intact  Cardiovascular:      Rate and Rhythm: Normal rate and regular rhythm  Heart sounds: Normal heart sounds     Pulmonary:      Effort: Pulmonary effort is normal       Breath sounds: Normal breath sounds  Abdominal:      General: Bowel sounds are normal       Palpations: Abdomen is soft  Tenderness: There is no abdominal tenderness  Comments: Abdomen is soft, minimal diffuse tenderness predominantly in left lower quadrant  Exam limited secondary to patient's body habitus   Musculoskeletal:         General: Normal range of motion  Cervical back: Normal range of motion  Skin:     General: Skin is warm  Capillary Refill: Capillary refill takes less than 2 seconds  Comments: Skin changes as seen in media  Weeping clear fluid  Neurological:      General: No focal deficit present  Mental Status: She is alert and oriented to person, place, and time     Psychiatric:         Mood and Affect: Mood normal                  Vital Signs  ED Triage Vitals   Temperature Pulse Respirations Blood Pressure SpO2   05/06/22 1124 05/06/22 1122 05/06/22 1122 05/06/22 1122 05/06/22 1122   (!) 97 3 °F (36 3 °C) 87 18 113/53 95 %      Temp src Heart Rate Source Patient Position - Orthostatic VS BP Location FiO2 (%)   -- -- -- -- --             Pain Score       05/06/22 1122       No Pain           Vitals:    05/06/22 1122 05/06/22 1300   BP: 113/53    Pulse: 87 83         Visual Acuity      ED Medications  Medications   acetaminophen (TYLENOL) tablet 975 mg (975 mg Oral Given 5/6/22 1221)   ondansetron (ZOFRAN) injection 4 mg (4 mg Intravenous Given 5/6/22 1221)   sodium chloride 0 9 % bolus 1,000 mL (1,000 mL Intravenous New Bag 5/6/22 1222)       Diagnostic Studies  Results Reviewed     Procedure Component Value Units Date/Time    HS Troponin I 4hr [[id number]]  (Normal) Collected: 05/06/22 1642    Lab Status: Final result Specimen: Blood from Arm, Right Updated: 05/06/22 1711     hs TnI 4hr 9 ng/L      Delta 4hr hsTnI -6 ng/L     Clostridium difficile toxin by PCR with EIA [[id number]]     Lab Status: No result Specimen: Stool from Per Rectum Lactic acid, plasma [[id number]]  (Normal) Collected: 05/06/22 1401    Lab Status: Final result Specimen: Blood from Arm, Right Updated: 05/06/22 1421     LACTIC ACID 1 0 mmol/L     Narrative:      Result may be elevated if tourniquet was used during collection  CBC and differential [[id number]]  (Abnormal) Collected: 05/06/22 1216    Lab Status: Final result Specimen: Blood from Arm, Right Updated: 05/06/22 1421     WBC 19 38 Thousand/uL      RBC 4 67 Million/uL      Hemoglobin 11 4 g/dL      Hematocrit 39 6 %      MCV 85 fL      MCH 24 4 pg      MCHC 28 8 g/dL      RDW 16 9 %      MPV 8 8 fL      Platelets 597 Thousands/uL     Narrative: This is an appended report  These results have been appended to a previously verified report  Manual Differential(PHLEBS Do Not Order) [[id number]]  (Abnormal) Collected: 05/06/22 1216    Lab Status: Final result Specimen: Blood from Arm, Right Updated: 05/06/22 1421     Segmented % 93 %      Bands % 1 %      Lymphocytes % 2 %      Monocytes % 4 %      Eosinophils, % 0 %      Basophils % 0 %      Absolute Neutrophils 18 22 Thousand/uL      Lymphocytes Absolute 0 39 Thousand/uL      Monocytes Absolute 0 78 Thousand/uL      Eosinophils Absolute 0 00 Thousand/uL      Basophils Absolute 0 00 Thousand/uL      Total Counted --     RBC Morphology Normal     Platelet Estimate Increased    Blood culture #2 [[id number]] Collected: 05/06/22 1401    Lab Status: In process Specimen: Blood from Arm, Right Updated: 05/06/22 1404    Blood culture #1 [[id number]] Collected: 05/06/22 1401    Lab Status:  In process Specimen: Blood from Arm, Left Updated: 05/06/22 1404    Protime-INR [[id number]]  (Abnormal) Collected: 05/06/22 1227    Lab Status: Final result Specimen: Blood from Arm, Right Updated: 05/06/22 1317     Protime 28 1 seconds      INR 2 72    APTT [[id number]]  (Abnormal) Collected: 05/06/22 1227    Lab Status: Final result Specimen: Blood from Arm, Right Updated: 05/06/22 1317 PTT 59 seconds     HS Troponin 0hr (reflex protocol) [[id number]]  (Normal) Collected: 05/06/22 1216    Lab Status: Final result Specimen: Blood from Arm, Right Updated: 05/06/22 1254     hs TnI 0hr 15 ng/L     HS Troponin I 2hr [[id number]]     Lab Status: No result Specimen: Blood     COVID/FLU/RSV [[id number]]  (Normal) Collected: 05/06/22 1207    Lab Status: Final result Specimen: Nasopharyngeal Swab Updated: 05/06/22 1252     SARS-CoV-2 Negative     INFLUENZA A PCR Negative     INFLUENZA B PCR Negative     RSV PCR Negative    Narrative:      FOR PEDIATRIC PATIENTS - copy/paste COVID Guidelines URL to browser: [email or web address]  Breathometer    SARS-CoV-2 assay is a Nucleic Acid Amplification assay intended for the  qualitative detection of nucleic acid from SARS-CoV-2 in nasopharyngeal  swabs  Results are for the presumptive identification of SARS-CoV-2 RNA  Positive results are indicative of infection with SARS-CoV-2, the virus  causing COVID-19, but do not rule out bacterial infection or co-infection  with other viruses  Laboratories within the [place] and its  territories are required to report all positive results to the appropriate  public health authorities  Negative results do not preclude SARS-CoV-2  infection and should not be used as the sole basis for treatment or other  patient management decisions  Negative results must be combined with  clinical observations, patient history, and epidemiological information  This test has not been FDA cleared or approved  This test has been authorized by FDA under an Emergency Use Authorization  (EUA)  This test is only authorized for the duration of time the  declaration that circumstances exist justifying the authorization of the  emergency use of an in vitro diagnostic tests for detection of SARS-CoV-2  virus and/or diagnosis of COVID-19 infection under section 564(b)(1) of  the Act, 21 U  S C  553KWG-3(T)(4), unless the authorization is terminated  or revoked sooner  The test has been validated but independent review by FDA  and CLIA is pending  Test performed using [employer] Gene[employer]pert: This RT-PCR assay targets N2,  a region unique to SARS-CoV-2  A conserved region in the E-gene was chosen  for pan-Sarbecovirus detection which includes SARS-CoV-2  Comprehensive metabolic panel [[id number]]  (Abnormal) Collected: 05/06/22 1216    Lab Status: Final result Specimen: Blood from Arm, Right Updated: 05/06/22 1252     Sodium 134 mmol/L      Potassium 4 0 mmol/L      Chloride 96 mmol/L      CO2 30 mmol/L      ANION GAP 8 mmol/L      BUN 20 mg/dL      Creatinine 1 15 mg/dL      Glucose 146 mg/dL      Calcium 8 6 mg/dL      Corrected Calcium 9 5 mg/dL      AST 20 U/L      ALT 12 U/L      Alkaline Phosphatase 115 U/L      Total Protein 7 5 g/dL      Albumin 2 9 g/dL      Total Bilirubin 1 46 mg/dL      eGFR 48 ml/min/1 73sq m     Narrative:      [place] guidelines for Chronic Kidney Disease (CKD):     Stage 1 with normal or high GFR (GFR > 90 mL/min/1 73 square meters)    Stage 2 Mild CKD (GFR = 60-89 mL/min/1 73 square meters)    Stage 3A Moderate CKD (GFR = 45-59 mL/min/1 73 square meters)    Stage 3B Moderate CKD (GFR = 30-44 mL/min/1 73 square meters)    Stage 4 Severe CKD (GFR = 15-29 mL/min/1 73 square meters)    Stage 5 End Stage CKD (GFR <15 mL/min/1 73 square meters)  Note: GFR calculation is accurate only with a steady state creatinine    Lipase [[id number]]  (Normal) Collected: 05/06/22 1216    Lab Status: Final result Specimen: Blood from Arm, Right Updated: 05/06/22 1252     Lipase 20 u/L                  CT head without contrast    (Results Pending)   CT chest abdomen pelvis wo contrast    (Results Pending)              Procedures  Procedures         ED Course  ED Course as of 05/06/22 1730   Fri May 06, 2022   1225 WBC(!): 19 38  Result noted  No clear source    Patient with history of C diff  At this time I estimate risks of antibiotics to be greater than any benefit  Patient has minimal abdominal tenderness on exam however given C diff history, concern for toxic megacolon  Will plan to obtain CT abdomen and pelvis  Given patient's body habitus and poor reliability of portable chest x-ray, will also obtain CT chest to assess for other infectious or inflammatory process  Patient continued to complain of headache although somewhat improved with Tylenol  Will also obtain CT head  1350 Patient did not fit and CT scan  Will consider transferring patient  Reached out to Jose Maria who will come down and see the patient prior to making transfer request                                              MDM  Number of Diagnoses or Management Options  Generalized abdominal pain  Leukocytosis  Diagnosis management comments: Patient presented with nausea, vomiting, headache  Vitals within normal limits upon initial arrival   Patient was nontoxic appearing and afebrile  Leukocytosis of 19 noted  No obvious source  Patient has skin wounds that appear chronic and do not have any fluctuance and are weeping clear fluid  Unknown source at this time  Patient neurologically intact  Relatively low suspicion for intracranial hemorrhage however will obtain CT head along with chest, abdomen and pelvis CT to assess for potential intrathoracic or intra-abdominal source of infection to account for patient's symptoms  Patient did not fit in [address] [place] [address]  Noncontrast CT ordered due to [facility]'s guidelines in the setting of critical shortage is with recommendations to only use contrast in the setting of suspected pulmonary embolism, dissection, trauma and stroke  Will avoid directly admitting patient to [facility] due to concern for potentially surgical cause of patients symptoms  Will ED to ED transfer patient to [facility]  Dr Juana Shane accepting  Discussed case with Aida RACHEL from   Both , [facility] and Dr Mel Grady of GI agree that given patient is afebrile, non-toxic appearing with no clear source of infection with C Diff history, will defer IV abx at this time as we estimate risks greater than benefit  Patient agreeable to plan  Portions of the record may have been created with voice recognition software  Occasional wrong word or "sound a like" substitutions may have occurred due to the inherent limitations of voice recognition software  Read the chart carefully and recognize, using context, where substitutions have occurred  Disposition  Final diagnoses:   Generalized abdominal pain   Leukocytosis     Time reflects when diagnosis was documented in both MDM as applicable and the Disposition within this note     Time User Action Codes Description Comment    5/6/2022  2:18 PM Gabriella Makenzie Add [R10 [id number]] Generalized abdominal pain     5/6/2022  3:22 PM Gabriella Makenzie Add [[id number]] Leukocytosis       ED Disposition     ED Disposition Condition Date/Time Comment    Transfer to Another Facility-In Network  Fri May 6, 2022  3:22 PM Radha Stevenson should be transferred [facility] to [facility]          MD Documentation      Most Recent Value   Patient Condition The patient has been stabilized such that within reasonable medical probability, no material deterioration of the patient condition or the condition of the unborn child(kevin) is likely to result from the transfer, An emergency transfer is being made prior to stabilization due to the need for definitive care and the benefit of transfer outweighs the risk   Reason for Transfer Level of Care needed not available at this facility, Patient/Family request, No bed available at level of patient's needs   Benefits of Transfer Specialized equipment and/or services available at the receiving facility (Include comment)________________________, Continuity of care, Patient preference   Risks of Transfer Potential for delay in receiving treatment, Potential deterioration of medical condition, Loss of IV, Increased discomfort during transfer   Accepting Physician Dr Laura Fabian Name, Alisha Ling      RN Documentation      Most [address] [place] [address] Name, Alisha Ling      Follow-up Information    None         Discharge Medication List as of 5/6/2022  5:01 PM      CONTINUE these medications which have NOT CHANGED    Details   acetaminophen (TYLENOL) 650 mg CR tablet Take 650 mg by mouth every 8 (eight) hours, Historical Med      allopurinol (ZYLOPRIM) 100 mg tablet Take 1 tablet (100 mg total) by mouth daily, Starting Mon 9/30/2019, Normal      DULoxetine (CYMBALTA) 20 mg capsule Take 1 capsule (20 mg total) by mouth daily, Starting Wed 8/15/2018, Normal      furosemide (LASIX) 40 mg tablet Take 1 tablet (40 mg total) by mouth daily, Starting Fri 8/23/2019, Normal      levothyroxine 125 mcg tablet Take 125 mcg by mouth daily, Historical Med      nystatin (MYCOSTATIN) powder Apply topically 2 (two) times a day, Starting Sun 11/21/2021, Normal      rifaximin (XIFAXAN) 200 mg tablet Take 2 tablets (400 mg total) by mouth 3 (three) times a day for 20 days, Starting Wed 5/4/2022, Until Tue 5/24/2022, Normal      triamcinolone (KENALOG) 0 1 % cream Apply 0 1 application topically 2 (two) times a day, Starting Thu 12/16/2021, Historical Med      !! warfarin (COUMADIN) 2 mg tablet Take 1 tablet (2 mg total) by mouth every other day Monday, Wednesday, Friday, sunday, Starting Tue 2/22/2022, No Print      !! warfarin (COUMADIN) 5 mg tablet Take 1 tablet (5 mg total) by mouth every other day Tuesday, Thursday, saturday, Starting Tue 2/22/2022, No Print       !! - Potential duplicate medications found  Please discuss with provider  No discharge procedures on file      PDMP Review     None          ED Provider  Electronically Signed by              Merline Bran, PA-C  05/06/22 7998

## 2022-05-06 NOTE — ED PROVIDER NOTES
History  Chief Complaint   Patient presents with    Abdominal Pain     Patient transferred from [facility] for CT scan due to weight  Patient seen at [facility] for abdominal pain for the past month or so  HPI    This is a 80 y o  old female who presents to the ED for evaluation of nausea and abdominal pain  Sent from [facility] for evaluation of abdominal pain and need for imaging I was unable to be obtained due to morbid obesity  She reports having abdominal pain nausea and vomiting since last night, multiple episodes of vomiting  Left-sided abdominal pain  Having some diarrhea  The history of C diff, is on p o  Vanco   Seen and evaluated by gastroenterology at [facility] and was recommended to have CT imaging prior to admission  Prior to Admission Medications   Prescriptions Last Dose Informant Patient Reported? Taking?    DULoxetine (CYMBALTA) 20 mg capsule  Self No No   Sig: Take 1 capsule (20 mg total) by mouth daily   acetaminophen (TYLENOL) 650 mg CR tablet  Self Yes No   Sig: Take 650 mg by mouth every 8 (eight) hours   allopurinol (ZYLOPRIM) 100 mg tablet  Self No No   Sig: Take 1 tablet (100 mg total) by mouth daily   furosemide (LASIX) 40 mg tablet  Self No No   Sig: Take 1 tablet (40 mg total) by mouth daily   Patient taking differently: Take 20 mg by mouth daily as needed Prn weight gain 10 pounds    levothyroxine 125 mcg tablet  Self Yes No   Sig: Take 125 mcg by mouth daily   nystatin (MYCOSTATIN) powder  Self No No   Sig: Apply topically 2 (two) times a day   rifaximin (XIFAXAN) 200 mg tablet   No No   Sig: Take 2 tablets (400 mg total) by mouth 3 (three) times a day for 20 days   triamcinolone (KENALOG) 0 1 % cream  Self Yes No   Sig: Apply 0 1 application topically 2 (two) times a day   warfarin (COUMADIN) 2 mg tablet   No No   Sig: Take 1 tablet (2 mg total) by mouth every other day Monday, Wednesday, Friday, sunday   warfarin (COUMADIN) 5 mg tablet   No No   Sig: Take 1 tablet (5 mg total) by mouth every other day Tuesday, Thursday, saturday      Facility-Administered Medications: None       Past Medical History:   Diagnosis Date    Atrial fibrillation ([place] [address] )     Bladder stone     C  difficile colitis     Cellulitis     CHF (congestive heart failure) ([place] [address] )     Depression with anxiety     Disease of thyroid gland     Diverticulitis     Enterocolitis     History of abnormal cervical Pap smear     Kidney stone     Lymphedema     Menopause     Age 53    Morbid obesity with BMI of 70 and over, adult ([place] [address] )     MRSA (methicillin resistant Staphylococcus aureus)     abdominal wound    Osteoarthritis     Phlebitis     left lower leg    Spondylolysis        Past Surgical History:   Procedure Laterality Date    APPENDECTOMY      CARPAL TUNNEL RELEASE      CHOLECYSTECTOMY      CYSTOSCOPY      stent    FOOT SURGERY  1982    bone spur    KNEE SURGERY      WISDOM TOOTH EXTRACTION         Family History   Problem Relation Age of Onset    Heart failure Mother     Heart disease Mother     Lymphoma Mother     Kidney disease Father     Heart disease Father     Heart failure Father     Diabetes type II Father     Diabetes type II Sister     Diabetes type II Brother     Uterine cancer Paternal Aunt     Breast cancer Neg Hx     Colon cancer Neg Hx     Ovarian cancer Neg Hx      I have reviewed and agree with the history as documented      E-Cigarette/Vaping    E-Cigarette Use Never User      E-Cigarette/Vaping Substances    Nicotine No     THC No     CBD No     Flavoring No     Other No     Unknown No      Social History     Tobacco Use    Smoking status: Former Smoker    Smokeless tobacco: Never Used    Tobacco comment: 5 packs/day until 5 (age 17-19) - As per [employer]    Vaping Use    Vaping Use: Never used   Substance Use Topics    Alcohol use: Not Currently    Drug use: Not Currently     Comment: As a teen - As per [employer]      Review of Systems   Constitutional: Negative for chills, fatigue, fever and unexpected weight change  HENT: Negative for congestion, rhinorrhea and sore throat  Eyes: Negative for redness and visual disturbance  Respiratory: Negative for cough and shortness of breath  Cardiovascular: Negative for chest pain and leg swelling  Gastrointestinal: Positive for abdominal pain, nausea and vomiting  Negative for constipation and diarrhea  Endocrine: Negative for cold intolerance and heat intolerance  Genitourinary: Negative for dysuria, frequency and urgency  Musculoskeletal: Negative for back pain  Skin: Negative for rash  Neurological: Negative for dizziness, syncope and numbness  All other systems reviewed and are negative  Physical Exam  Physical Exam  Vitals and nursing note reviewed  Constitutional:       General: She is not in acute distress  Appearance: She is well-developed  She is not diaphoretic  Comments: Morbidly obese   HENT:      Head: Normocephalic and atraumatic  Right Ear: External ear normal       Left Ear: External ear normal       Nose: Nose normal       Mouth/Throat:      Mouth: Mucous membranes are moist       Pharynx: No oropharyngeal exudate  Eyes:      Conjunctiva/sclera: Conjunctivae normal       Pupils: Pupils are equal, round, and reactive to light  Cardiovascular:      Rate and Rhythm: Normal rate and regular rhythm  Heart sounds: Normal heart sounds  No murmur heard  No gallop  Pulmonary:      Effort: Pulmonary effort is normal       Breath sounds: Normal breath sounds  No wheezing  Chest:      Chest wall: No tenderness  Abdominal:      General: Bowel sounds are normal  There is no distension  Palpations: Abdomen is soft  Tenderness: There is generalized abdominal tenderness  There is no guarding or rebound  Comments: Difficult to examine secondary to obesity  Musculoskeletal:         General: No tenderness or deformity   Normal range of motion  Cervical back: Normal range of motion and neck supple  Lymphadenopathy:      Cervical: No cervical adenopathy  Skin:     General: Skin is warm and dry  Findings: No erythema or rash  Neurological:      Mental Status: She is alert and oriented to person, place, and time  Cranial Nerves: No cranial nerve deficit  Vital Signs  ED Triage Vitals [05/06/22 1800]   Temperature Pulse Respirations Blood Pressure SpO2   97 7 °F (36 5 °C) 84 18 109/65 99 %      Temp Source Heart Rate Source Patient Position - Orthostatic VS BP Location FiO2 (%)   Oral Monitor Lying Right arm --      Pain Score       7         ED Medications  Medications   allopurinol (ZYLOPRIM) tablet 100 mg (100 mg Oral Given 5/7/22 0956)   DULoxetine (CYMBALTA) delayed release capsule 20 mg (has no administration in time range)   furosemide (LASIX) tablet 40 mg (40 mg Oral Not Given 5/7/22 0956)   levothyroxine tablet 125 mcg (125 mcg Oral Given 5/7/22 0956)   nystatin (MYCOSTATIN) powder ( Topical Refused 5/7/22 1000)   warfarin (COUMADIN) tablet 2 mg (has no administration in time range)   warfarin (COUMADIN) tablet 4 mg (has no administration in time range)   acetaminophen (TYLENOL) tablet 650 mg (has no administration in time range)   vancomycin (VANCOCIN) 2,000 mg in sodium chloride 0 9 % 500 mL IVPB (2,000 mg Intravenous New Bag 5/7/22 0956)   vancomycin (VANCOCIN) oral solution 125 mg (has no administration in time range)       Diagnostic Studies  Results Reviewed     None        CT abdomen pelvis wo contrast   Final Result by Emre Avila MD (05/06 1937)      No acute intra-abdominal pelvic abnormality identified allowing for limitations described above  Mild diverticulosis without evidence for diverticulitis  Stable left ovarian hypodensity  Left renal atrophy, stable  Severe degenerative changes in remodeling in the right hip, chronic              Workstation performed: [id number]           Procedures  Procedures    ED Course       A/P: This is a 80 y o  female who presents to the ED for evaluation of abd pain  Will get CT and admit  Ct scan OK given limitations without contrast, but does look like cellulitis  Amie discuss with SLIM for admission and ABX choice  Patient needs to be admitted  MDM    Disposition  Final diagnoses:   Cellulitis - Abd Wall   Leukocytosis   Nausea & vomiting   History of Clostridioides difficile colitis   Left lower quadrant abdominal pain     Time reflects when diagnosis was documented in both MDM as applicable and the Disposition within this note     Time User Action Codes Description Comment    5/6/2022  9:21 PM Laz [place] Add [L03 90] Cellulitis     5/6/2022  9:21 PM Laz [place] Add [J94 305] Leukocytosis     5/6/2022  9:21 PM Laz [place] Add [R11 2] Nausea & vomiting     5/6/2022  9:21 PM Laz [place] Add [Z86 19] History of Clostridioides difficile colitis     5/6/2022  9:21 PM Laz [place] Modify [L03 90] Cellulitis Abd Wall    5/6/2022  9:22 PM Laz [place] Add [R10 32] Left lower quadrant abdominal pain       ED Disposition     ED Disposition Condition Date/Time Comment    Admit Stable Fri May 6, 2022  9:21 PM Case was discussed with BRITANY and the patient's admission status was agreed to be Admission Status: inpatient status to the service of Dr Marjan Osborne          Follow-up Information    None         Current Discharge Medication List      CONTINUE these medications which have NOT CHANGED    Details   acetaminophen (TYLENOL) 650 mg CR tablet Take 650 mg by mouth every 8 (eight) hours      allopurinol (ZYLOPRIM) 100 mg tablet Take 1 tablet (100 mg total) by mouth daily  Qty: 30 tablet, Refills: 5    Associated Diagnoses: Chronic gout without tophus, unspecified cause, unspecified site      DULoxetine (CYMBALTA) 20 mg capsule Take 1 capsule (20 mg total) by mouth daily  Qty: 30 capsule, Refills: 5    Associated Diagnoses: Depression, unspecified depression type      furosemide (LASIX) 40 mg tablet Take 1 tablet (40 mg total) by mouth daily  Qty: 30 tablet, Refills: 5    Associated Diagnoses: Essential hypertension      levothyroxine 125 mcg tablet Take 125 mcg by mouth daily      nystatin (MYCOSTATIN) powder Apply topically 2 (two) times a day  Qty: 15 g, Refills: 0    Associated Diagnoses: Cellulitis of abdominal wall      rifaximin (XIFAXAN) 200 mg tablet Take 2 tablets (400 mg total) by mouth 3 (three) times a day for 20 days  Qty: 120 tablet, Refills: 0    Associated Diagnoses: C  difficile colitis      triamcinolone (KENALOG) 0 1 % cream Apply 0 1 application topically 2 (two) times a day      !! warfarin (COUMADIN) 2 mg tablet Take 1 tablet (2 mg total) by mouth every other day Monday, Wednesday, Friday, sunday    Associated Diagnoses: Chronic anticoagulation      !! warfarin (COUMADIN) 5 mg tablet Take 1 tablet (5 mg total) by mouth every other day Tuesday, Thursday, saturday    Associated Diagnoses: Atrial fibrillation, unspecified type [facility])       ! ! - Potential duplicate medications found  Please discuss with provider  No discharge procedures on file      PDMP Review     None          ED Provider  Electronically Signed by           Cedrick Herndon MD  05/07/22 5298

## 2022-05-06 NOTE — LETTER
Thank you for allowing us to participate in the care of your patient, Layne Sage, who was hospitalized from 05/06/2022 through 5/13/2022 with the admitting diagnosis of cellulitis/panniculitis at left hip and right abdomen  Cultures grew MRSA E  Patient was followed by infectious disease  Patient managed with IV vancomycin with prophylactic oral vancomycin due to history C diff  Patient evaluated for C diff with PCR and results were negative  Patient was transitioned to oral doxycycline and Keflex which will be completed on 05/20/2022  Patient at discharge to continue oral vancomycin for C diff prophylaxis until 05/22/2022  If you have any additional questions or would like to discuss further, please feel free to contact me      Leslye Jeffery MD  [place] [address] Internal Medicine, Hospitalist  [phone]

## 2022-05-06 NOTE — CONSULTS
Consultation - [facility]  Heath Nakita 80 y o  female MRN: [id number]  Unit/Bed#: ED 22 Encounter: [id number]        Inpatient consult to gastroenterology  Consult performed by: Nikhil Bejarano PA-C  Consult ordered by: Nicole Yun PA-C          Reason for Consult / Principal Problem:     Generalized abdominal pain      ASSESSMENT AND PLAN:      Patient is a 80 y o  female PMH significant for AFib on Coumadin, CHF, depression with anxiety, hypothyroidism, and morbid obesity known to [address] [place] [address] for persistent C diff and with multiple previous admissions for GI related issue is who presents to the ED via EMS on 05/06 with complaints regarding ?abdominal cellulitis and nausea/vomiting x24 hrs  Initially testing positive for C  Diff on 01/08/2022 - Subsequently tested negative on 02/17/2022 and again positive on 04/15/2022  Unable to obtain abdominal imaging 2/2 patient's body habitus  Labs notable for new leukocytosis (19 38), stable hemoglobin, chronic thrombocytosis, mildly elevated alk phos and T bili (115, 1 46), normal lipase, therapeutic INR (2 72), normal lactic acid  COVID/flu/RSV negative and Blood cultures x2 ordered, not yet collected  Patient remains hemodynamically stable and afebrile  Clinical picture most consistent with acute viral gastroenteritis, but given newly elevated white count, also concerning for persistent C diff infection - Although leukocytosis may be due to ?cellulitis of abdomen  Patient does not currently meet sepsis criteria, and recommend avoiding empiric use of antibiotics without clear source for infection given susceptibility to C diff colitis  Unfortunately, assessment is limited due to inability to obtain abdominal imaging  Recommend transfer to an alternative facility that has the capability to obtain abdominal imaging   While awaiting transfer, also recommend antiemetics/analgesics PRN and obtaining a repeat C diff PCR/EIA if able   - Continue antiemetics/analgesic PRN  - Avoid empiric abx given susceptibility to C diff colitis  - Obtain repeat C diff PCR/EIA if able  - Ultimately transfer to facility with capabilities to obtain abdominal imaging    GI will continue to follow until time of transfer      ______________________________________________________________________    HPI: Patient is a 80 y o  female PMH significant for AFib on Coumadin, CHF, depression with anxiety, hypothyroidism, and morbid obesity known to [address] [place] [address] for persistent C diff and with multiple previous admissions for GI related issue is who presents to the ED via EMS on 05/06 with complaints regarding ?abdominal cellulitis and nausea/vomiting x24 hrs  Patient states that she developed progressively worsening nausea yesterday evening, resulting in 12-14 episodes of nonbloody/nonbilious emesis  States she last vomited around 10 AM, and since receiving IV Zofran upon arrival to the ED her nausea has almost entirely resolved  She also endorses left-sided abdominal tenderness, but this appears to be chronic per chart review  She does have a history of persistent C diff infection, initially testing positive on 01/08/2022  She subsequently tested negative on 02/17/2022 and again positive on 04/15/2022  Patient has since completed a 40 day course of vancomycin  Patient states she completed her last dose of vancomycin 3 days prior, and has since been seen by GI who recommended a 20 day course of rifaximin given extended use of vancomycin - she has yet to pick this up from her pharmacy  Patient denies diarrhea, actually reports improved consistency and frequency of her stools  She also reports a low-grade fever of 97 6° F  Denies heartburn, epigastric pain, constipation, diarrhea, noticing blood in her stool/black tarry stools, or unintentional weight loss    Patient also had a recent EGD on 04/04/2022 with normal appearing duodenum; mild abnormal mucosa with erosion in the body of the stomach and antrum s/p biopsy; hiatal hernia; normal appearing stomach; normal appearing esophagus s/p biopsy  Biopsies of the stomach and esophagus were unremarkable  REVIEW OF SYSTEMS:    CONSTITUTIONAL: Denies any fever, chills, rigors, and weight loss  HEENT: No earache or tinnitus  Denies hearing loss or visual disturbances  CARDIOVASCULAR: No chest pain or palpitations  RESPIRATORY: Denies any cough, hemoptysis, shortness of breath or dyspnea on exertion  GASTROINTESTINAL: As noted in the History of Present Illness  GENITOURINARY: No problems with urination  Denies any hematuria or dysuria  NEUROLOGIC: No dizziness or vertigo, denies headaches  MUSCULOSKELETAL: Denies any muscle or joint pain  SKIN: Denies skin rashes or itching  ENDOCRINE: Denies excessive thirst  Denies intolerance to heat or cold  PSYCHOSOCIAL: Denies depression or anxiety  Denies any recent memory loss         Historical Information   Past Medical History:   Diagnosis Date    Atrial fibrillation ([place] [address] )     Bladder stone     C  difficile colitis     Cellulitis     CHF (congestive heart failure) ([place] [address] )     Depression with anxiety     Disease of thyroid gland     Diverticulitis     Enterocolitis     History of abnormal cervical Pap smear     Kidney stone     Lymphedema     Menopause     Age 53    Morbid obesity with BMI of 70 and over, adult ([place] [address] )     MRSA (methicillin resistant Staphylococcus aureus)     abdominal wound    Osteoarthritis     Phlebitis     left lower leg    Spondylolysis      Past Surgical History:   Procedure Laterality Date    APPENDECTOMY      CARPAL TUNNEL RELEASE      CHOLECYSTECTOMY      CYSTOSCOPY      stent    FOOT SURGERY  1982    bone spur    KNEE SURGERY      WISDOM TOOTH EXTRACTION       Social History   Social History     Substance and Sexual Activity   Alcohol Use Not Currently     Social History     Substance and Sexual Activity   Drug Use Not Currently    Comment: As a teen - As per [employer]      Social History     Tobacco Use   Smoking Status Former Smoker   Smokeless Tobacco Never Used   Tobacco Comment    5 packs/day until 5 (age 16-20) - As per [employer]      Family History   Problem Relation Age of Onset    Heart failure Mother     Heart disease Mother     Lymphoma Mother     Kidney disease Father     Heart disease Father     Heart failure Father     Diabetes type II Father     Diabetes type II Sister     Diabetes type II Brother     Uterine cancer Paternal Aunt     Breast cancer Neg Hx     Colon cancer Neg Hx     Ovarian cancer Neg Hx        Meds/Allergies     (Not in a hospital admission)    No current facility-administered medications for this encounter  Allergies   Allergen Reactions    Augmentin Es-600  [Amoxicillin-Pot Clavulanate]     Penicillins Other (See Comments)     Tolerates cefepime (11/18/21)    Sulfa Antibiotics Hives    Vitamin C [Ascorbate - Food Allergy]     Latex Rash and Edema           Objective     Blood pressure 113/53, pulse 83, temperature (!) 97 3 °F (36 3 °C), resp  rate 18, height 5' 1" (1 549 m), weight (!) 193 kg (425 lb), SpO2 93 %  Body mass index is 80 3 kg/m²  No intake or output data in the 24 hours ending 05/06/22 1459      PHYSICAL EXAM:      General Appearance:   +Physical exam limited 2/2 body habitus; Morbidly obese; Alert, cooperative, no distress   HEENT:   Normocephalic, atraumatic, anicteric  Neck:  Supple, symmetrical, trachea midline   Lungs:   Clear to auscultation bilaterally; no rales, rhonchi or wheezing; respirations unlabored    Heart[de-identified]   Regular rate and rhythm; no murmur, rub, or gallop  Abdomen:   +Mild left-sided abd TTP; Erythematous areas of right and left lower abdominal region/pannus, warm to touch, with serous drainage noted;  Soft, non-distended; normal bowel sounds; no masses, no organomegaly    Genitalia:   Deferred    Rectal:   Deferred Extremities:  No cyanosis, clubbing or edema    Pulses:  2+ and symmetric all extremities    Skin:  No jaundice, rashes, or lesions    Lymph nodes:  No palpable cervical lymphadenopathy        Lab Results:   Admission on 05/06/2022   Component Date Value    WBC 05/06/2022 19 38*    RBC 05/06/2022 4 67     Hemoglobin 05/06/2022 11 4*    Hematocrit 05/06/2022 39 6     MCV 05/06/2022 85     MCH 05/06/2022 24 4*    MCHC 05/06/2022 28 8*    RDW 05/06/2022 16 9*    MPV 05/06/2022 8 8*    Platelets 64/97/6188 402*    Sodium 05/06/2022 134*    Potassium 05/06/2022 4 0     Chloride 05/06/2022 96     CO2 05/06/2022 30     ANION GAP 05/06/2022 8     BUN 05/06/2022 20     Creatinine 05/06/2022 1 15     Glucose 05/06/2022 146*    Calcium 05/06/2022 8 6     Corrected Calcium 05/06/2022 9 5     AST 05/06/2022 20     ALT 05/06/2022 12     Alkaline Phosphatase 05/06/2022 115*    Total Protein 05/06/2022 7 5     Albumin 05/06/2022 2 9*    Total Bilirubin 05/06/2022 1 46*    eGFR 05/06/2022 48     Lipase 05/06/2022 20     hs TnI 0hr 05/06/2022 15     SARS-CoV-2 05/06/2022 Negative     INFLUENZA A PCR 05/06/2022 Negative     INFLUENZA B PCR 05/06/2022 Negative     RSV PCR 05/06/2022 Negative     Protime 05/06/2022 28 1*    INR 05/06/2022 2 72*    PTT 05/06/2022 59*    LACTIC ACID 05/06/2022 1 0     Segmented % 05/06/2022 93*    Bands % 05/06/2022 1     Lymphocytes % 05/06/2022 2*    Monocytes % 05/06/2022 4     Eosinophils, % 05/06/2022 0     Basophils % 05/06/2022 0     Absolute Neutrophils 05/06/2022 18 22*    Lymphocytes Absolute 05/06/2022 0 39*    Monocytes Absolute 05/06/2022 0 78     Eosinophils Absolute 05/06/2022 0 00     Basophils Absolute 05/06/2022 0 00     RBC Morphology 05/06/2022 Normal     Platelet Estimate 42/72/6943 Increased*       Imaging Studies: I have personally reviewed pertinent imaging studies

## 2022-05-06 NOTE — EMTALA/ACUTE CARE TRANSFER
[address] [facility] [address]  Dept: [phone]      EMTALA TRANSFER CONSENT    NAME Radha Stevenson                                         1950                              MRN [id number]    I have been informed of my rights regarding examination, treatment, and transfer   by Dr Manisha Mccormack DO    Benefits: Specialized equipment and/or services available at the receiving facility (Include comment)________________________,Continuity of care,Patient preference    Risks: Potential for delay in receiving treatment,Potential deterioration of medical condition,Loss of IV,Increased discomfort during transfer      Transfer Request   I acknowledge that my medical condition has been evaluated and explained to me by the emergency department physician or other qualified medical person and/or my attending physician who has recommended and offered to me further medical examination and treatment  I understand the Hospital's obligation with respect to the treatment and stabilization of my emergency medical condition  I nevertheless request to be transferred  I release the Hospital, the doctor, and any other persons caring for me from all responsibility or liability for any injury or ill effects that may result from my transfer and agree to accept all responsibility for the consequences of my choice to transfer, rather than receive stabilizing treatment at the Hospital  I understand that because the transfer is my request, my insurance may not provide reimbursement for the services  The Hospital will assist and direct me and my family in how to make arrangements for transfer, but the hospital is not liable for any fees charged by the transport service  In spite of this understanding, I refuse to consent to further medical examination and treatment which has been offered to me, and request transfer to  [facility], [address] : [facility]   I authorize the performance of emergency medical procedures and treatments upon me in both transit and upon arrival at the receiving facility  Additionally, I authorize the release of any and all medical records to the receiving facility and request they be transported with me, if possible  I authorize the performance of emergency medical procedures and treatments upon me in both transit and upon arrival at the receiving facility  Additionally, I authorize the release of any and all medical records to the receiving facility and request they be transported with me, if possible  I understand that the safest mode of transportation during a medical emergency is an ambulance and that the Hospital advocates the use of this mode of transport  Risks of traveling to the receiving facility by car, including absence of medical control, life sustaining equipment, such as oxygen, and medical personnel has been explained to me and I fully understand them  (JOANN CORRECT BOX BELOW)  [  ]  I consent to the stated transfer and to be transported by ambulance/helicopter  [  ]  I consent to the stated transfer, but refuse transportation by ambulance and accept full responsibility for my transportation by car  I understand the risks of non-ambulance transfers and I exonerate the Hospital and its staff from any deterioration in my condition that results from this refusal     X___________________________________________    DATE  22  TIME________  Signature of patient or legally responsible individual signing on patient behalf           RELATIONSHIP TO PATIENT_________________________          Provider Certification    NAME Rafael Melgoza                                        [facility] 1950                              MRN [id number]    A medical screening exam was performed on the above named patient    Based on the examination:    Condition Necessitating Transfer The primary encounter diagnosis was Generalized abdominal pain  A diagnosis of Leukocytosis was also pertinent to this visit  Patient Condition: The patient has been stabilized such that within reasonable medical probability, no material deterioration of the patient condition or the condition of the unborn child(kevin) is likely to result from the transfer,An emergency transfer is being made prior to stabilization due to the need for definitive care and the benefit of transfer outweighs the risk    Reason for Transfer: Level of Care needed not available at this facility,Patient/Family request,No bed available at level of patient's needs    Transfer Requirements: [id number]   · Space available and qualified personnel available for treatment as acknowledged by    · Agreed to accept transfer and to provide appropriate medical treatment as acknowledged by       Dr Herlinda Galeazzi  · Appropriate medical records of the examination and treatment of the patient are provided at the time of transfer   [address] [facility], [address] _______  · Transfer will be performed by qualified personnel from    and appropriate transfer equipment as required, including the use of necessary and appropriate life support measures  Provider Certification: I have examined the patient and explained the following risks and benefits of being transferred/refusing transfer to the patient/family:         Based on these reasonable risks and benefits to the patient and/or the unborn child(kevin), and based upon the information available at the time of the patients examination, I certify that the medical benefits reasonably to be expected from the provision of appropriate medical treatments at another medical facility outweigh the increasing risks, if any, to the individuals medical condition, and in the case of labor to the unborn child, from effecting the transfer      X____________________________________________ DATE 05/06/22        TIME_______      ORIGINAL - SEND TO MEDICAL RECORDS   COPY - SEND WITH PATIENT DURING TRANSFER

## 2022-05-07 LAB
ANION GAP SERPL CALCULATED.3IONS-SCNC: 8 MMOL/L (ref 4–13)
BASOPHILS # BLD AUTO: 0.02 THOUSANDS/ΜL (ref 0–0.1)
BASOPHILS NFR BLD AUTO: 0 % (ref 0–1)
BUN SERPL-MCNC: 22 MG/DL (ref 5–25)
CALCIUM SERPL-MCNC: 8.2 MG/DL (ref 8.4–10.2)
CHLORIDE SERPL-SCNC: 98 MMOL/L (ref 96–108)
CK SERPL-CCNC: 19 U/L (ref 26–192)
CO2 SERPL-SCNC: 27 MMOL/L (ref 21–32)
CREAT SERPL-MCNC: 1.05 MG/DL (ref 0.6–1.3)
EOSINOPHIL # BLD AUTO: 0.06 THOUSAND/ΜL (ref 0–0.61)
EOSINOPHIL NFR BLD AUTO: 0 % (ref 0–6)
ERYTHROCYTE [DISTWIDTH] IN BLOOD BY AUTOMATED COUNT: 17 % (ref 11.6–15.1)
GFR SERPL CREATININE-BSD FRML MDRD: 53 ML/MIN/1.73SQ M
GLUCOSE SERPL-MCNC: 95 MG/DL (ref 65–140)
HCT VFR BLD AUTO: 36.4 % (ref 34.8–46.1)
HGB BLD-MCNC: 10.8 G/DL (ref 11.5–15.4)
IMM GRANULOCYTES # BLD AUTO: 0.11 THOUSAND/UL (ref 0–0.2)
IMM GRANULOCYTES NFR BLD AUTO: 1 % (ref 0–2)
INR PPP: 2.02 (ref 0.84–1.19)
LYMPHOCYTES # BLD AUTO: 0.82 THOUSANDS/ΜL (ref 0.6–4.47)
LYMPHOCYTES NFR BLD AUTO: 6 % (ref 14–44)
MCH RBC QN AUTO: 24.5 PG (ref 26.8–34.3)
MCHC RBC AUTO-ENTMCNC: 29.7 G/DL (ref 31.4–37.4)
MCV RBC AUTO: 83 FL (ref 82–98)
MONOCYTES # BLD AUTO: 0.71 THOUSAND/ΜL (ref 0.17–1.22)
MONOCYTES NFR BLD AUTO: 5 % (ref 4–12)
NEUTROPHILS # BLD AUTO: 11.89 THOUSANDS/ΜL (ref 1.85–7.62)
NEUTS SEG NFR BLD AUTO: 88 % (ref 43–75)
NRBC BLD AUTO-RTO: 0 /100 WBCS
PLATELET # BLD AUTO: 359 THOUSANDS/UL (ref 149–390)
PMV BLD AUTO: 8.7 FL (ref 8.9–12.7)
POTASSIUM SERPL-SCNC: 3.9 MMOL/L (ref 3.5–5.3)
PROCALCITONIN SERPL-MCNC: 0.78 NG/ML
PROTHROMBIN TIME: 22.6 SECONDS (ref 11.6–14.5)
RBC # BLD AUTO: 4.4 MILLION/UL (ref 3.81–5.12)
SODIUM SERPL-SCNC: 133 MMOL/L (ref 135–147)
WBC # BLD AUTO: 13.61 THOUSAND/UL (ref 4.31–10.16)

## 2022-05-07 PROCEDURE — 99232 SBSQ HOSP IP/OBS MODERATE 35: CPT | Performed by: INTERNAL MEDICINE

## 2022-05-07 PROCEDURE — 82550 ASSAY OF CK (CPK): CPT

## 2022-05-07 PROCEDURE — 85610 PROTHROMBIN TIME: CPT | Performed by: PHYSICIAN ASSISTANT

## 2022-05-07 PROCEDURE — 85025 COMPLETE CBC W/AUTO DIFF WBC: CPT | Performed by: PHYSICIAN ASSISTANT

## 2022-05-07 PROCEDURE — 84145 PROCALCITONIN (PCT): CPT | Performed by: PHYSICIAN ASSISTANT

## 2022-05-07 PROCEDURE — 80048 BASIC METABOLIC PNL TOTAL CA: CPT | Performed by: PHYSICIAN ASSISTANT

## 2022-05-07 PROCEDURE — 99223 1ST HOSP IP/OBS HIGH 75: CPT | Performed by: INTERNAL MEDICINE

## 2022-05-07 RX ORDER — LEVOTHYROXINE SODIUM 0.12 MG/1
125 TABLET ORAL
Status: DISCONTINUED | OUTPATIENT
Start: 2022-05-07 | End: 2022-05-13 | Stop reason: HOSPADM

## 2022-05-07 RX ORDER — ENOXAPARIN SODIUM 100 MG/ML
60 INJECTION SUBCUTANEOUS 2 TIMES DAILY
Status: DISCONTINUED | OUTPATIENT
Start: 2022-05-07 | End: 2022-05-07

## 2022-05-07 RX ORDER — NYSTATIN 100000 [USP'U]/G
POWDER TOPICAL 2 TIMES DAILY
Status: DISCONTINUED | OUTPATIENT
Start: 2022-05-07 | End: 2022-05-07

## 2022-05-07 RX ORDER — DULOXETIN HYDROCHLORIDE 20 MG/1
20 CAPSULE, DELAYED RELEASE ORAL DAILY
Status: DISCONTINUED | OUTPATIENT
Start: 2022-05-07 | End: 2022-05-13 | Stop reason: HOSPADM

## 2022-05-07 RX ORDER — ACETAMINOPHEN 325 MG/1
650 TABLET ORAL EVERY 6 HOURS PRN
Status: DISCONTINUED | OUTPATIENT
Start: 2022-05-07 | End: 2022-05-13 | Stop reason: HOSPADM

## 2022-05-07 RX ORDER — WARFARIN SODIUM 2 MG/1
2 TABLET ORAL
Status: DISCONTINUED | OUTPATIENT
Start: 2022-05-08 | End: 2022-05-09

## 2022-05-07 RX ORDER — FUROSEMIDE 40 MG/1
40 TABLET ORAL DAILY
Status: DISCONTINUED | OUTPATIENT
Start: 2022-05-07 | End: 2022-05-13 | Stop reason: HOSPADM

## 2022-05-07 RX ORDER — ALLOPURINOL 100 MG/1
100 TABLET ORAL DAILY
Status: DISCONTINUED | OUTPATIENT
Start: 2022-05-07 | End: 2022-05-13 | Stop reason: HOSPADM

## 2022-05-07 RX ORDER — WARFARIN SODIUM 4 MG/1
4 TABLET ORAL
Status: DISCONTINUED | OUTPATIENT
Start: 2022-05-07 | End: 2022-05-13 | Stop reason: HOSPADM

## 2022-05-07 RX ADMIN — CEFEPIME HYDROCHLORIDE 2000 MG: 2 INJECTION, POWDER, FOR SOLUTION INTRAVENOUS at 12:25

## 2022-05-07 RX ADMIN — Medication 125 MG: at 20:17

## 2022-05-07 RX ADMIN — SODIUM CHLORIDE 1000 ML: 0.9 INJECTION, SOLUTION INTRAVENOUS at 13:23

## 2022-05-07 RX ADMIN — VANCOMYCIN HYDROCHLORIDE 2000 MG: 1 INJECTION, POWDER, LYOPHILIZED, FOR SOLUTION INTRAVENOUS at 09:56

## 2022-05-07 RX ADMIN — VANCOMYCIN HYDROCHLORIDE 2000 MG: 1 INJECTION, POWDER, LYOPHILIZED, FOR SOLUTION INTRAVENOUS at 22:02

## 2022-05-07 RX ADMIN — LEVOTHYROXINE SODIUM 125 MCG: 125 TABLET ORAL at 09:56

## 2022-05-07 RX ADMIN — DULOXETINE HYDROCHLORIDE 20 MG: 20 CAPSULE, DELAYED RELEASE ORAL at 11:29

## 2022-05-07 RX ADMIN — ALLOPURINOL 100 MG: 100 TABLET ORAL at 09:56

## 2022-05-07 RX ADMIN — WARFARIN SODIUM 4 MG: 4 TABLET ORAL at 18:54

## 2022-05-07 RX ADMIN — Medication 125 MG: at 13:22

## 2022-05-07 NOTE — PROGRESS NOTES
Vancomycin Assessment    Sana Maki is a 80 y o  female who is currently receiving vancomycin 20mg/Kg/dose q12h for skin-soft tissue infection     Relevant clinical data and objective history reviewed:  Creatinine   Date Value Ref Range Status   05/06/2022 1 15 0 60 - 1 30 mg/dL Final     Comment:     Standardized to IDMS reference method   04/20/2022 0 95 0 60 - 1 30 mg/dL Final     Comment:     Standardized to IDMS reference method   04/19/2022 0 98 0 60 - 1 30 mg/dL Final     Comment:     Standardized to IDMS reference method   05/19/2018 1 03 0 6 - 1 2 MG/DL Final     Comment:     IDMS method performed  The drugs Metamizole, Sulfasalazine, and Sulfapyridine may interfere and  result in false low results  /67   Pulse 85   Temp 98 4 °F (36 9 °C)   Resp 18   Ht 5' 1" (1 549 m)   Wt (!) 212 kg (467 lb 6 oz)   LMP  (LMP Unknown)   SpO2 96%   BMI 88 31 kg/m²   No intake/output data recorded  Lab Results   Component Value Date/Time    BUN 20 05/06/2022 12:16 PM    BUN 33 (H) 05/19/2018 06:15 PM    WBC 13 61 (H) 05/07/2022 10:15 AM    WBC 9 1 05/19/2018 06:15 PM    HGB 10 8 (L) 05/07/2022 10:15 AM    HGB 12 3 05/19/2018 06:15 PM    HCT 36 4 05/07/2022 10:15 AM    HCT 38 5 05/19/2018 06:15 PM    MCV 83 05/07/2022 10:15 AM    MCV 93 3 05/19/2018 06:15 PM     05/07/2022 10:15 AM     05/19/2018 06:15 PM     Temp Readings from Last 3 Encounters:   05/07/22 98 4 °F (36 9 °C)   05/06/22 (!) 97 3 °F (36 3 °C)   04/20/22 98 °F (36 7 °C)     Vancomycin Days of Therapy: 1    Assessment/Plan  The patient is currently on vancomycin utilizing scheduled dosing based on adjusted body weight (due to obesity)  Baseline risks associated with therapy include: concomitant nephrotoxic medications and advanced age  The patient is currently receiving 2000 mg IV q12h and is clinically appropriate and dose will be continued    Pharmacy will also follow closely for s/sx of nephrotoxicity, infusion reactions, and appropriateness of therapy  BMP and CBC will be ordered per protocol  Plan for trough as patient approaches steady state, prior to the 4th  dose at approximately 2130 on 05/08  Due to infection severity, will target a trough of 15-20 (appropriate for most indications)   Pharmacy will continue to follow the patients culture results and clinical progress daily      Florentin Mark, Pharmacist

## 2022-05-07 NOTE — ASSESSMENT & PLAN NOTE
Wt Readings from Last 3 Encounters:   05/06/22 (!) 212 kg (467 lb 6 oz)   05/06/22 (!) 193 kg (425 lb)   04/04/22 (!) 181 kg (400 lb)     · Does not appear to be in acute exacerbation at this  · Continue home Lasix

## 2022-05-07 NOTE — PROGRESS NOTES
[facility]  Progress Note - Layne Sage 1950, 80 y o  female MRN: [id number]  Unit/Bed#: W -01 Encounter: [id number]  Primary Care Provider: ORACIO Ritchie   Date and time admitted to hospital: 5/6/2022  5:48 PM    * Cellulitis  Assessment & Plan  · Cellulitis of pannus on right side of abdomen, and left hip cellulitis  · Left Hip cellulitis may be in the setting of a past acetabulum fracture  · Patient noted worsening redness, and drainage from both sites  · Patient has named left hip cellulitis "Meldon Latia and he's a leaker", and right abdomen cellulitis "Tiny"  · Consider Psych eval on monday  · History of C diff in the past recently finished p o  Vancomycin  · She has had history of MRSA in the past as well on right leg  · IV vancomycin for now with prophylaxis 125 mg p o  Vancomycin q 6 hours for C diff  · Starting Cefepime   · WBC now 13 61  · Will check procalcitonin  · Follow blood cultures  · Consider consultation to Infectious Disease    C  difficile colitis  Assessment & Plan  · History of C diff infection back in January again noted last month  · On p o  Vancomycin  · She does have significantly elevated white blood cell count 19  · Will continue p o  Vancomycin while on IV antibiotics for cellulitis of the abdominal wall  · Check C diff      Extreme Obesity - BMI 70 and over, adult [facility])  Assessment & Plan  · Noted with BMI 88 31  · Recommend diet lifestyle modifications  · She would benefit from outpatient follow-up with bariatric surgery  · She is effectively bedbound    Chronic atrial fibrillation [facility])  Assessment & Plan  · Noted history of chronic atrial fibrillation    · Anticoagulated on warfarin  · INR therapeutic 2 7  · Does not appear to be on rate control  · Daily INR    Chronic diastolic congestive heart failure [facility])  Assessment & Plan  Wt Readings from Last 3 Encounters:   05/06/22 (!) 212 kg (467 lb 6 oz)   05/06/22 (!) 193 kg (425 lb)   22 (!) 181 kg (400 lb)     · Does not appear to be in acute exacerbation at this  · Continue home Lasix  Blood Pressure: 108/67        Other specified hypothyroidism  Assessment & Plan  · Continue levothyroxine          VTE Pharmacologic Prophylaxis:   VTE Score: 5 High Risk (Score >/= 5) - Pharmacological DVT Prophylaxis Ordered: Enoxaparin (Lovenox)  Sequential Compression Devices Ordered  Mechanical VTE Prophylaxis in Place: No    Patient Centered Rounds: I have performed bedside rounds with nursing staff today  Discussions with Specialists or Other Care Team Provider: Pharmacy    Education and Discussions with Family / Patient: Patient declined call to   Current Length of Stay: 1 day(s)    Current Patient Status: Inpatient     Discharge Plan / Estimated Discharge Date: Anticipate discharge in 48-72 hrs to home with home services  Pending medical optimization    Code Status: Level 1 - Full Code      Subjective:   Patient reports feeling ok  She has named her two cellulitis abscesses "Tiny and Gabbi Quita" which are both described as "leakers " Patient also reports ankylosing spondylitis therefore does not use pillows for her neck  She is effectively bed bound and reports breaking her acetabulum when getting off the toilet one day most likely due to her body weight  Objective:     Vitals:   Temp (24hrs), Av 8 °F (36 6 °C), Min:97 3 °F (36 3 °C), Max:98 4 °F (36 9 °C)    Temp:  [97 3 °F (36 3 °C)-98 4 °F (36 9 °C)] 98 4 °F (36 9 °C)  HR:  [74-87] 85  Resp:  [14-19] 18  BP: ()/(51-67) 108/67  SpO2:  [93 %-100 %] 96 %  Body mass index is 88 31 kg/m²  Input and Output Summary (last 24 hours):     No intake or output data in the 24 hours ending 22 1048    Physical Exam:     Physical Exam  Constitutional:       Appearance: Normal appearance  She is obese  Comments: Morbidly obese   HENT:      Head: Normocephalic and atraumatic        Right Ear: External ear normal       Left Ear: External ear normal    Eyes:      Conjunctiva/sclera: Conjunctivae normal    Cardiovascular:      Rate and Rhythm: Normal rate  Rhythm irregular  Pulses: Normal pulses  Heart sounds: Normal heart sounds  Comments: Afib  Pulmonary:      Effort: Pulmonary effort is normal    Abdominal:      Palpations: Abdomen is soft  Skin:     General: Skin is warm and dry  Capillary Refill: Capillary refill takes less than 2 seconds  Neurological:      Mental Status: She is alert  Psychiatric:         Mood and Affect: Mood normal          Behavior: Behavior normal          Thought Content: Thought content normal           Additional Data:     Labs:  Results from last 7 days   Lab Units 05/07/22  1015 05/06/22  1216 05/06/22  1216   WBC Thousand/uL 13 61*   < > 19 38*   HEMOGLOBIN g/dL 10 8*   < > 11 4*   HEMATOCRIT % 36 4   < > 39 6   PLATELETS Thousands/uL 359   < > 402*   BANDS PCT %  --   --  1   NEUTROS PCT % 88*  --   --    LYMPHS PCT % 6*  --   --    LYMPHO PCT %  --   --  2*   MONOS PCT % 5  --   --    MONO PCT %  --   --  4   EOS PCT % 0  --  0    < > = values in this interval not displayed  Results from last 7 days   Lab Units 05/07/22  1015 05/06/22  1216 05/06/22  1216   SODIUM mmol/L 133*   < > 134*   POTASSIUM mmol/L 3 9   < > 4 0   CHLORIDE mmol/L 98   < > 96   CO2 mmol/L 27   < > 30   BUN mg/dL 22   < > 20   CREATININE mg/dL 1 05   < > 1 15   ANION GAP mmol/L 8   < > 8   CALCIUM mg/dL 8 2*   < > 8 6   ALBUMIN g/dL  --   --  2 9*   TOTAL BILIRUBIN mg/dL  --   --  1 46*   ALK PHOS U/L  --   --  115*   ALT U/L  --   --  12   AST U/L  --   --  20   GLUCOSE RANDOM mg/dL 95   < > 146*    < > = values in this interval not displayed       Results from last 7 days   Lab Units 05/06/22  1227   INR  2 72*             Results from last 7 days   Lab Units 05/06/22  1401   LACTIC ACID mmol/L 1 0       Imaging: Reviewed radiology reports from this admission including: abdominal/pelvic CT scan     Recent Cultures (last 7 days):     Results from last 7 days   Lab Units 05/06/22  1401   BLOOD CULTURE  Received in Microbiology Lab  Culture in Progress  Received in Microbiology Lab  Culture in Progress         Lines/Drains:  Invasive Devices  Report    Peripheral Intravenous Line            Peripheral IV 04/18/22 Right Antecubital 18 days                Telemetry:        Last 24 Hours Medication List:   Current Facility-Administered Medications   Medication Dose Route Frequency Provider Last Rate    acetaminophen  650 mg Oral Q6H PRN Taj MYA Billingsley      allopurinol  100 mg Oral Daily Mitchell Guerra PA-C      cefepime  1,000 mg Intravenous Q24H Sita Dasilva,       DULoxetine  20 mg Oral Daily Mitchell Guerra PA-C      furosemide  40 mg Oral Daily Mitchell Guerra PA-C      levothyroxine  125 mcg Oral Early Morning Mitchell Guerra PA-C      sodium chloride  1,000 mL Intravenous Once Sita Doing, DO      vancomycin  2,000 mg Intravenous Q12H Mitchell Guerra PA-C 2,000 mg (05/07/22 0956)    vancomycin  125 mg Oral Q6H [address] Mitchell Guerra PA-C      [START ON 5/8/2022] warfarin  2 mg Oral Once per day on Sun Mon Wed Fri Mitchell Guerra PA-C      warfarin  4 mg Oral Once per day on Tue Thu Sat Taj MYA Billingsley          Note generated by Resident and Medical Student Faheem DE LA TORREII

## 2022-05-07 NOTE — PLAN OF CARE
Problem: Potential for Falls  Goal: Patient will remain free of falls  Description: INTERVENTIONS:  - Educate patient/family on patient safety including physical limitations  - Instruct patient to call for assistance with activity   - Consult OT/PT to assist with strengthening/mobility   - Keep Call bell within reach  - Keep bed low and locked with side rails adjusted as appropriate  - Keep care items and personal belongings within reach  - Initiate and maintain comfort rounds  - Make Fall Risk Sign visible to staff  - Offer Toileting every Hours, in advance of need  - Initiate/Maintain alarm  - Obtain necessary fall risk management equipment:   - Apply yellow socks and bracelet for high fall risk patients  - Consider moving patient to room near nurses station  Outcome: Progressing     Problem: MOBILITY - ADULT  Goal: Maintain or return to baseline ADL function  Description: INTERVENTIONS:  -  Assess patient's ability to carry out ADLs; assess patient's baseline for ADL function and identify physical deficits which impact ability to perform ADLs (bathing, care of mouth/teeth, toileting, grooming, dressing, etc )  - Assess/evaluate cause of self-care deficits   - Assess range of motion  - Assess patient's mobility; develop plan if impaired  - Assess patient's need for assistive devices and provide as appropriate  - Encourage maximum independence but intervene and supervise when necessary  - Involve family in performance of ADLs  - Assess for home care needs following discharge   - Consider OT consult to assist with ADL evaluation and planning for discharge  - Provide patient education as appropriate  Outcome: Progressing  Goal: Maintains/Returns to pre admission functional level  Description: INTERVENTIONS:  - Perform BMAT or MOVE assessment daily    - Set and communicate daily mobility goal to care team and patient/family/caregiver     - Collaborate with rehabilitation services on mobility goals if consulted  - Perform Range of Motion times a day  - Reposition patient every hours  - Dangle patient times a day  - Stand patient  times a day  - Ambulate patient  times a day  - Out of bed to chair times a day   - Out of bed for meals  times a day  - Out of bed for toileting  - Record patient progress and toleration of activity level   Outcome: Progressing     Problem: Prexisting or High Potential for Compromised Skin Integrity  Goal: Skin integrity is maintained or improved  Description: INTERVENTIONS:  - Identify patients at risk for skin breakdown  - Assess and monitor skin integrity  - Assess and monitor nutrition and hydration status  - Monitor labs   - Assess for incontinence   - Turn and reposition patient  - Assist with mobility/ambulation  - Relieve pressure over bony prominences  - Avoid friction and shearing  - Provide appropriate hygiene as needed including keeping skin clean and dry  - Evaluate need for skin moisturizer/barrier cream  - Collaborate with interdisciplinary team   - Patient/family teaching  - Consider wound care consult   Outcome: Progressing     Problem: Nutrition/Hydration-ADULT  Goal: Nutrient/Hydration intake appropriate for improving, restoring or maintaining nutritional needs  Description: Monitor and assess patient's nutrition/hydration status for malnutrition  Collaborate with interdisciplinary team and initiate plan and interventions as ordered  Monitor patient's weight and dietary intake as ordered or per policy  Utilize nutrition screening tool and intervene as necessary  Determine patient's food preferences and provide high-protein, high-caloric foods as appropriate       INTERVENTIONS:  - Monitor oral intake, urinary output, labs, and treatment plans  - Assess nutrition and hydration status and recommend course of action  - Evaluate amount of meals eaten  - Assist patient with eating if necessary   - Allow adequate time for meals  - Recommend/ encourage appropriate diets, oral nutritional supplements, and vitamin/mineral supplements  - Order, calculate, and assess calorie counts as needed  - Recommend, monitor, and adjust tube feedings and TPN/PPN based on assessed needs  - Assess need for intravenous fluids  - Provide specific nutrition/hydration education as appropriate  - Include patient/family/caregiver in decisions related to nutrition  Outcome: Progressing

## 2022-05-07 NOTE — H&P
[facility]  H&P- Jorge Ser 1950, 80 y o  female MRN: [id number]  Unit/Bed#: ED 26 Encounter: [id number]  Primary Care Provider: ORACIO Sanches   Date and time admitted to hospital: 5/6/2022  5:48 PM    * Cellulitis  Assessment & Plan  · Cellulitis of pannus on right side of abdomen, and left hip cellulitis  Patient noted worsening redness, and drainage from both sites  · History of C diff in the past recently finished p o  Vancomycin  · She has had history of MRSA in the past as well on right leg  · IV vancomycin for now with prophylaxis 125 mg p o  Vancomycin q 6 hours for C diff  · She does not meet any sepsis criteria with the exception of white blood cell count greater than 19  · Will check procalcitonin  · Follow blood cultures  · Consider consultation to Infectious Disease    C  difficile colitis  Assessment & Plan  · History of C diff infection back in January again noted last month  · On p o  Vancomycin  · She does have significantly elevated white blood cell count 19  · Will continue p o  Vancomycin while on IV antibiotics for cellulitis of the abdominal wall  · Check C diff      Chronic atrial fibrillation (HCC)  Assessment & Plan  · Noted history of chronic atrial fibrillation  · Anticoagulated on warfarin  · INR therapeutic 2 7  · Does not appear to be on rate control  · Daily INR    Chronic diastolic congestive heart failure (HCC)  Assessment & Plan  Wt Readings from Last 3 Encounters:   05/06/22 (!) 212 kg (467 lb 6 oz)   05/06/22 (!) 193 kg (425 lb)   04/04/22 (!) 181 kg (400 lb)     · Does not appear to be in acute exacerbation at this  · Continue home Lasix        Morbid obesity with BMI of 70 and over, adult [facility])  Assessment & Plan  · Noted with BMI 88 31  · Recommend diet lifestyle modifications    · She might benefit from outpatient follow-up with bariatric surgery  · She is effectively bedbound    Other specified hypothyroidism  Assessment & Plan  · Continue levothyroxine    VTE Pharmacologic Prophylaxis: VTE Score: 5 High Risk (Score >/= 5) - Pharmacological DVT Prophylaxis Ordered: enoxaparin (Lovenox)  Sequential Compression Devices Ordered  Code Status: Prior full code   Discussion with family: Patient declined call to   Anticipated Length of Stay: Patient will be admitted on an inpatient basis with an anticipated length of stay of greater than 2 midnights secondary to cellulitis   Total Time for Visit, including Counseling / Coordination of Care: 60 minutes Greater than 50% of this total time spent on direct patient counseling and coordination of care  Chief Complaint:  Cellulitis    History of Present Illness:  Dian Hunter is a 80 y o  female with a PMH of of but atrial fibrillation, CHF, morbid obesity, MRSA infection, atrial fibrillation anticoagulated on Eliquis who presents with cellulitis  Patient noted cellulitis of right-sided pannus, and left hip with increasing pain miss over the past couple of days but with worsening pain, and drainage noted yesterday  Patient was receiving p o  Vancomycin for history of C diff infection in January, and again last month  She denies any injuries at this time  She states that she has been having low-grade fevers but nothing greater than 100  Does have white count approximately 19 on admission  Review of Systems:  Review of Systems   Constitutional: Positive for chills  Negative for fatigue, fever and unexpected weight change  Respiratory: Negative for cough, chest tightness and shortness of breath  Cardiovascular: Negative for chest pain and palpitations  Gastrointestinal: Negative for abdominal pain, diarrhea, nausea and vomiting  Genitourinary: Negative for decreased urine volume, dysuria, frequency and urgency  Musculoskeletal: Negative for arthralgias  Skin: Positive for color change and rash     Neurological: Negative for dizziness, syncope, light-headedness and headaches  All other systems reviewed and are negative  Past Medical and Surgical History:   Past Medical History:   Diagnosis Date    [facility]rial fibrillation ([facility] )     Bladder stone     C  difficile colitis     Cellulitis     CHF (congestive heart failure) ([facility] )     Depression with anxiety     Disease of thyroid gland     Diverticulitis     Enterocolitis     History of abnormal cervical Pap smear     Kidney stone     Lymphedema     Menopause     Age 53    Morbid obesity with BMI of 70 and over, adult ([facility] )     MRSA (methicillin resistant Staphylococcus aureus)     abdominal wound    Osteoarthritis     Phlebitis     left lower leg    Spondylolysis        Past Surgical History:   Procedure Laterality Date    APPENDECTOMY      CARPAL TUNNEL RELEASE      CHOLECYSTECTOMY      CYSTOSCOPY      stent    FOOT SURGERY  1982    bone spur    KNEE SURGERY      WISDOM TOOTH EXTRACTION         Meds/Allergies:  Prior to Admission medications    Medication Sig Start Date End Date Taking?  Authorizing Provider   acetaminophen (TYLENOL) 650 mg CR tablet Take 650 mg by mouth every 8 (eight) hours    Historical Provider, MD   allopurinol (ZYLOPRIM) 100 mg tablet Take 1 tablet (100 mg total) by mouth daily 9/30/19   Pablo Marin DO   DULoxetine (CYMBALTA) 20 mg capsule Take 1 capsule (20 mg total) by mouth daily 8/15/18   Pablo Marin DO   furosemide (LASIX) 40 mg tablet Take 1 tablet (40 mg total) by mouth daily  Patient taking differently: Take 20 mg by mouth daily as needed Prn weight gain 10 pounds  8/23/19   Pablo Marin DO   levothyroxine 125 mcg tablet Take 125 mcg by mouth daily    Historical Provider, MD   nystatin (MYCOSTATIN) powder Apply topically 2 (two) times a day 11/21/21   Ifrah Wan MD   rifaximin Bradlyanibal Abraham) 200 mg tablet Take 2 tablets (400 mg total) by mouth 3 (three) times a day for 20 days 5/4/22 5/24/22  [address] [facility] triamcinolone (KENALOG) 0 1 % cream Apply 0 1 application topically 2 (two) times a day 12/16/21   Historical Provider, MD   warfarin (COUMADIN) 2 mg tablet Take 1 tablet (2 mg total) by mouth every other day Monday, Wednesday, Friday, sunday 2/22/22   ORACIO Brumfield   warfarin (COUMADIN) 5 mg tablet Take 1 tablet (5 mg total) by mouth every other day Tuesday, Thursday, saturday 2/22/22   [address]     I have reviewed home medications with patient personally  Allergies:    Allergies   Allergen Reactions    Augmentin Es-600  [Amoxicillin-Pot Clavulanate]     Penicillins Other (See Comments)     Tolerates cefepime (11/18/21)    Sulfa Antibiotics Hives    Vitamin C [Ascorbate - Food Allergy]     Latex Rash and Edema       Social History:  Marital Status: Single   Occupation: unknown   Patient Pre-hospital Living Situation: Home  Patient Pre-hospital Level of Mobility: non-ambulatory/bed bound  Patient Pre-hospital Diet Restrictions: none   Substance Use History:   Social History     Substance and Sexual Activity   Alcohol Use Not Currently     Social History     Tobacco Use   Smoking Status Former Smoker   Smokeless Tobacco Never Used   Tobacco Comment    5 packs/day until 6 (age 16-20) - As per [employer]      Social History     Substance and Sexual Activity   Drug Use Not Currently    Comment: As a teen - As per [employer]        Family History:  Family History   Problem Relation Age of Onset    Heart failure Mother     Heart disease Mother     Lymphoma Mother     Kidney disease Father     Heart disease Father     Heart failure Father     Diabetes type II Father     Diabetes type II Sister     Diabetes type II Brother     Uterine cancer Paternal Aunt     Breast cancer Neg Hx     Colon cancer Neg Hx     Ovarian cancer Neg Hx        Physical Exam:     Vitals:   Blood Pressure: 118/59 (05/06/22 2139)  Pulse: 82 (05/06/22 2139)  Temperature: 97 7 °F (36 5 °C) (05/06/22 1800)  Temp Source: Oral (05/06/22 1800)  Respirations: 18 (05/06/22 2139)  Height: 5' 1" (154 9 cm) (05/06/22 1800)  Weight - Scale: (!) 212 kg (467 lb 6 oz) (05/06/22 1800)  SpO2: 100 % (05/06/22 2139)    Physical Exam  Vitals and nursing note reviewed  Constitutional:       General: She is not in acute distress  Appearance: She is obese  HENT:      Head: Normocephalic and atraumatic  Mouth/Throat:      Mouth: Mucous membranes are moist       Pharynx: Oropharynx is clear  Eyes:      General:         Right eye: No discharge  Conjunctiva/sclera: Conjunctivae normal       Pupils: Pupils are equal, round, and reactive to light  Cardiovascular:      Rate and Rhythm: Normal rate and regular rhythm  Pulses: Normal pulses  Heart sounds: Normal heart sounds  No murmur heard  Pulmonary:      Effort: Pulmonary effort is normal  No respiratory distress  Breath sounds: No wheezing or rales  Comments: On 2 L  Abdominal:      General: Abdomen is flat  There is no distension  Palpations: Abdomen is soft  Tenderness: There is no abdominal tenderness  Musculoskeletal:         General: Normal range of motion  Cervical back: Normal range of motion and neck supple  No muscular tenderness  Right lower leg: Edema present  Left lower leg: Edema present  Skin:     General: Skin is warm and dry  Capillary Refill: Capillary refill takes less than 2 seconds  Coloration: Skin is not jaundiced  Findings: Erythema present  Comments: Erythema, drainage right pannus, left hip   Neurological:      General: No focal deficit present  Mental Status: She is oriented to person, place, and time  Cranial Nerves: No cranial nerve deficit     Psychiatric:         Mood and Affect: Mood normal           Additional Data:     Lab Results:  Results from last 7 days   Lab Units 05/06/22  1216   WBC Thousand/uL 19 38*   HEMOGLOBIN g/dL 11 4*   HEMATOCRIT % 39 6   PLATELETS Thousands/uL 402*   BANDS PCT % 1   LYMPHO PCT % 2*   MONO PCT % 4   EOS PCT % 0     Results from last 7 days   Lab Units 05/06/22  1216   SODIUM mmol/L 134*   POTASSIUM mmol/L 4 0   CHLORIDE mmol/L 96   CO2 mmol/L 30   BUN mg/dL 20   CREATININE mg/dL 1 15   ANION GAP mmol/L 8   CALCIUM mg/dL 8 6   ALBUMIN g/dL 2 9*   TOTAL BILIRUBIN mg/dL 1 46*   ALK PHOS U/L 115*   ALT U/L 12   AST U/L 20   GLUCOSE RANDOM mg/dL 146*     Results from last 7 days   Lab Units 05/06/22  1227   INR  2 72*             Results from last 7 days   Lab Units 05/06/22  1401   LACTIC ACID mmol/L 1 0       Imaging: No pertinent imaging reviewed  CT abdomen pelvis wo contrast   Final Result by Eward Romberg, MD (05/06 1937)      No acute intra-abdominal pelvic abnormality identified allowing for limitations described above  Mild diverticulosis without evidence for diverticulitis  Stable left ovarian hypodensity  Left renal atrophy, stable  Severe degenerative changes in remodeling in the right hip, chronic  Workstation performed: [id number]             EKG and Other Studies Reviewed on Admission:   · EKG: Atrial fibrillation  HR 80     ** Please Note: This note has been constructed using a voice recognition system   **

## 2022-05-07 NOTE — ASSESSMENT & PLAN NOTE
· Noted with BMI 88 31  · Recommend diet lifestyle modifications    · She might benefit from outpatient follow-up with bariatric surgery  · She is effectively bedbound

## 2022-05-07 NOTE — ASSESSMENT & PLAN NOTE
· Noted with BMI 88 31  · Recommend diet lifestyle modifications    · She would benefit from outpatient follow-up with bariatric surgery  · She is effectively bedbound

## 2022-05-07 NOTE — ASSESSMENT & PLAN NOTE
October 12, 2018      Myacarolina Mack  [address] [facility] [place] [address]      Dear ,    We are writing to inform you of your test results.    Normal results.     Resulted Orders   BASIC METABOLIC PANEL   Result Value Ref Range    Sodium 142 133 - 144 mmol/L    Potassium 3.5 3.4 - 5.3 mmol/L    Chloride 106 94 - 109 mmol/L    Carbon Dioxide 28 20 - 32 mmol/L    Anion Gap 8 3 - 14 mmol/L    Glucose 86 70 - 99 mg/dL      Comment:      Fasting specimen    Urea Nitrogen 12 7 - 30 mg/dL    Creatinine 0.80 0.52 - 1.04 mg/dL    GFR Estimate 68 >60 mL/min/1.7m2      Comment:      Non  GFR Calc    GFR Estimate If Black 83 >60 mL/min/1.7m2      Comment:       GFR Calc    Calcium 8.9 8.5 - 10.1 mg/dL   HEMOGLOBIN A1C   Result Value Ref Range    Hemoglobin A1C  0 - 5.6 %     Normal <5.7% Prediabetes 5.7-6.4%  Diabetes 6.5% or higher - adopted from ADA consensus   guidelines.       If you have any questions or concerns, please call the clinic at the number listed above.   Sincerely,  Edelmira Ramirez MD/nr         Wt Readings from Last 3 Encounters:   05/06/22 (!) 212 kg (467 lb 6 oz)   05/06/22 (!) 193 kg (425 lb)   04/04/22 (!) 181 kg (400 lb)     · Does not appear to be in acute exacerbation at this  · Continue home Lasix  Blood Pressure: 108/67

## 2022-05-07 NOTE — ASSESSMENT & PLAN NOTE
· Noted history of chronic atrial fibrillation    · Anticoagulated on warfarin  · INR therapeutic 2 7  · Does not appear to be on rate control  · Daily INR

## 2022-05-07 NOTE — CONSULTS
Consultation - Infectious Disease   Prosper Herron 80 y o  female MRN: [id number]  Unit/Bed#: W -01 Encounter: [id number]      IMPRESSION & RECOMMENDATIONS:   1  Panniculitis  Patient's exam concerning for bilateral, lateral panniculitis  She has had prior history of MRSA at the sites  There does not appear to be further tracking past the groin  She is fortunately hemodynamically stable  Blood cultures pending  Continue IV vancomycin  Discontinue cefepime   Oral vancomycin dose adjusted as below   Continue to trend fever curve / vitals   Repeat CBC/chemistry tomorrow   Follow-up pending blood cultures   Monitor clinical exam closely   Diuresis as per primary   Frequent pressure offloading   Follow-up pending MRSA swab  Additional supportive care as per primary   Additional interventions pending clinical course    2  Recently treated C diff colitis  Patient recently completed prolonged course of oral vancomycin  Current GI symptoms are likely related to problem 1  Low suspicion for recurrent C diff  And CT imaging without inflammatory changes  Antibiotics as above   Decreased dose of oral vancomycin to 125mg q 12  Monitor stool output   Monitor abdominal exam   Continue to trend fever curve / WBC     3  Morbid obesity and lymphedema  Patient's BMI noted to be 88  Unfortunately these are her main risk factors for recurrence  Additional supportive measures as per primary  4  Leukocytosis  This is due to the above  Patient is fortunately otherwise hemodynamically stable  Will continue antibiotics and plan for repeat labs as above  Above plan discussed in detail with the patient at bedside   Above plan discussed in detail with primary service resident     ID consult service will continue to follow      HISTORY OF PRESENT ILLNESS:  Reason for Consult: cellulitis    HPI: Prosper Herron is a 80y o  year old female  With morbid obesity, diastolic heart failure, prior episodes of C diff, history of panniculitis with MRSA and chronic lymphedema  Recent hospital discharge summary reviewed as well as GI evaluations  Patient recently completed her course of prolonged oral vancomycin for C diff  On Tuesday  She reported in the last day or so that she was starting to feel unwell and then subsequently was developing vomiting along with some pain and redness developing in her pannus  When she noticed that the redness was expanding she contacted EMS and was brought in  Her caretaker was monitoring the site as well  She question if her bed rails may have been rubbing against the side of her pannus  She also noticed increased weeping after transport in the stretcher  She has developed blisters and cellulitis in her bilateral pannus in the past   She reports last diagnosed MRSA culture was 3 years ago  She currently otherwise denies having any nausea, vomiting, chest pain or shortness of breath since admission  She denies significant diarrhea at this time  She is currently afebrile  White blood cell count has downtrended  Blood cultures are pending  CT imaging of the abdomen reviewed and there is no inflammatory process present  CT of the head also unremarkable from April  Vitals are otherwise stable  Labs otherwise unremarkable  Previous ID evaluations and cultures reviewed  We are consulted at this time for further assistance with management  REVIEW OF SYSTEMS:  A complete 12 point system-based review of systems is negative other than that noted in the HPI      PAST MEDICAL HISTORY:  Past Medical History:   Diagnosis Date    [facility]     Bladder stone     C  difficile colitis     Cellulitis     [facility]     Depression with anxiety     Disease of thyroid gland     Diverticulitis     Enterocolitis     History of abnormal cervical Pap smear     Kidney stone     Lymphedema     Menopause     Age 53    Morbid obesity with BMI of 79 and over, adult [facility]     MRSA (methicillin resistant Staphylococcus aureus)     abdominal wound    Osteoarthritis     Phlebitis     left lower leg    Spondylolysis      Past Surgical History:   Procedure Laterality Date    APPENDECTOMY      CARPAL TUNNEL RELEASE      CHOLECYSTECTOMY      CYSTOSCOPY      stent    FOOT SURGERY  1982    bone spur    KNEE SURGERY      WISDOM TOOTH EXTRACTION         FAMILY HISTORY:  Non-contributory    SOCIAL HISTORY:  Social History   Social History     Substance and Sexual Activity   Alcohol Use Not Currently     Social History     Substance and Sexual Activity   Drug Use Not Currently    Comment: As a teen - As per [employer]      Social History     Tobacco Use   Smoking Status Former Smoker   Smokeless Tobacco Never Used   Tobacco Comment    5 packs/day until 5 (age 17-19) - As per [employer]        ALLERGIES:  Allergies   Allergen Reactions    Augmentin Es-600  [Amoxicillin-Pot Clavulanate]     Penicillins Other (See Comments)     Tolerates cefepime (21)    Sulfa Antibiotics Hives    Vitamin C [Ascorbate - Food Allergy]     Latex Rash and Edema       MEDICATIONS:  All current active medications have been reviewed  PHYSICAL EXAM:  Temp:  [97 7 °F (36 5 °C)-98 4 °F (36 9 °C)] 98 4 °F (36 9 °C)  HR:  [74-85] 85  Resp:  [14-19] 18  BP: ()/(51-67) 108/67  SpO2:  [96 %-100 %] 96 %  Temp (24hrs), Av 1 °F (36 7 °C), Min:97 7 °F (36 5 °C), Max:98 4 °F (36 9 °C)  Current: Temperature: 98 4 °F (36 9 °C)    Intake/Output Summary (Last 24 hours) at 2022 1335  Last data filed at 2022 1300  Gross per 24 hour   Intake 980 ml   Output --   Net 980 ml       General Appearance:  Appearing well, nontoxic, and in no distress; Patient is able to provide detailed history along with recent events     Head:  Normocephalic, without obvious abnormality, atraumatic   Eyes:  Conjunctiva pink and sclera anicteric, both eyes   Nose: Nares normal, mucosa normal, no drainage   Throat: Oropharynx moist without lesions   Neck: Supple, symmetrical, no adenopathy, no tenderness/mass/nodules   Back:    I am unable to turn the patient myself at this time due to her body habitus  Lungs:     Decreased breath sounds throughout on anterior auscultation, respirations unlabored on nasal cannula   Chest Wall:  No tenderness or deformity   Heart:  RRR; no murmur, rub or gallop appreciated though distant heart sounds   Abdomen:   Soft, non-tender, non-distended, positive bowel sounds; Patient has a very large pannus  She has indurated tissues on the bilateral lateral aspects of her pannus  There is some tracking erythema down to the groin  No further tracking erythema down the legs  Patient has some chronic blistering lesions present on the anterior portion of the pannus  On the right side there appears to be an area of skin breakdown and weeping  Extremities: No cyanosis, clubbing or edema; Changes of lymphedema in the lower legs bilaterally  Skin: No   Other rashes or lesions  No other draining wounds noted  Lymph nodes: Cervical, supraclavicular nodes normal   Neurologic:  patient is awake and alert and oriented x3  She is bedbound at baseline  She is freely moving her upper extremities against gravity  She is able to roll her legs in the bed  LABS, IMAGING, & OTHER STUDIES:  Lab Results:  I have personally reviewed pertinent labs    Results from last 7 days   Lab Units 05/07/22  1015 05/06/22  1216   WBC Thousand/uL 13 61* 19 38*   HEMOGLOBIN g/dL 10 8* 11 4*   PLATELETS Thousands/uL 359 402*     Results from last 7 days   Lab Units 05/07/22  1015 05/06/22  1216 05/06/22  1216   POTASSIUM mmol/L 3 9   < > 4 0   CHLORIDE mmol/L 98   < > 96   CO2 mmol/L 27   < > 30   BUN mg/dL 22   < > 20   CREATININE mg/dL 1 05   < > 1 15   EGFR ml/min/1 73sq m 53   < > 48   CALCIUM mg/dL 8 2*   < > 8 6   AST U/L  --   --  20   ALT U/L  --   --  12   ALK PHOS U/L  --   --  115* < > = values in this interval not displayed  Results from last 7 days   Lab Units 05/06/22  1401   BLOOD CULTURE  Received in Microbiology Lab  Culture in Progress  Received in Microbiology Lab  Culture in Progress  Imaging Studies:   I have personally reviewed pertinent imaging study reports and images in PACS  Other Studies:   I have personally reviewed pertinent reports

## 2022-05-07 NOTE — ASSESSMENT & PLAN NOTE
· Cellulitis of pannus on right side of abdomen, and left hip cellulitis  · Left Hip cellulitis may be in the setting of a past acetabulum fracture  · Patient noted worsening redness, and drainage from both sites  · Patient has named left hip cellulitis "Donata Oven and he's a leaker", and right abdomen cellulitis "Tiny"  · Consider Psych eval on monday  · History of C diff in the past recently finished p o  Vancomycin  · She has had history of MRSA in the past as well on right leg  · IV vancomycin for now with prophylaxis 125 mg p o   Vancomycin q 6 hours for C diff  · Starting Cefepime   · WBC now 13 61  · Will check procalcitonin  · Follow blood cultures  · Consider consultation to Infectious Disease

## 2022-05-07 NOTE — ED NOTES
Attempted to call report to floor  Called RN on assignment sheet and charge RN without luck   Will attempt again     Yanet Terrell RN  05/06/22 9859

## 2022-05-07 NOTE — ASSESSMENT & PLAN NOTE
· History of C diff infection back in January again noted last month  · On p o  Vancomycin  · She does have significantly elevated white blood cell count 19  · Will continue p o   Vancomycin while on IV antibiotics for cellulitis of the abdominal wall  · Check C diff

## 2022-05-07 NOTE — ASSESSMENT & PLAN NOTE
· Cellulitis of pannus on right side of abdomen, and left hip cellulitis  Patient noted worsening redness, and drainage from both sites  · History of C diff in the past recently finished p o  Vancomycin  · She has had history of MRSA in the past as well on right leg  · IV vancomycin for now with prophylaxis 125 mg p o   Vancomycin q 6 hours for C diff  · She does not meet any sepsis criteria with the exception of white blood cell count greater than 19  · Will check procalcitonin  · Follow blood cultures  · Consider consultation to Infectious Disease

## 2022-05-08 ENCOUNTER — APPOINTMENT (INPATIENT)
Dept: NON INVASIVE DIAGNOSTICS | Facility: HOSPITAL | Age: 72
DRG: 603 | End: 2022-05-08
Payer: MEDICARE

## 2022-05-08 LAB
ALBUMIN SERPL BCP-MCNC: 2.4 G/DL (ref 3.5–5)
ALP SERPL-CCNC: 88 U/L (ref 34–104)
ALT SERPL W P-5'-P-CCNC: 11 U/L (ref 7–52)
ANION GAP SERPL CALCULATED.3IONS-SCNC: 5 MMOL/L (ref 4–13)
AORTIC ROOT: 2.8 CM
APICAL FOUR CHAMBER EJECTION FRACTION: 50 %
ASCENDING AORTA: 3.4 CM (ref 2.56–3.84)
AST SERPL W P-5'-P-CCNC: 16 U/L (ref 13–39)
BASOPHILS # BLD AUTO: 0.05 THOUSANDS/ΜL (ref 0–0.1)
BASOPHILS NFR BLD AUTO: 0 % (ref 0–1)
BILIRUB SERPL-MCNC: 0.7 MG/DL (ref 0.2–1)
BUN SERPL-MCNC: 26 MG/DL (ref 5–25)
CALCIUM ALBUM COR SERPL-MCNC: 9.3 MG/DL (ref 8.3–10.1)
CALCIUM SERPL-MCNC: 8 MG/DL (ref 8.4–10.2)
CHLORIDE SERPL-SCNC: 100 MMOL/L (ref 96–108)
CO2 SERPL-SCNC: 28 MMOL/L (ref 21–32)
CREAT SERPL-MCNC: 1.14 MG/DL (ref 0.6–1.3)
EOSINOPHIL # BLD AUTO: 0.24 THOUSAND/ΜL (ref 0–0.61)
EOSINOPHIL NFR BLD AUTO: 2 % (ref 0–6)
ERYTHROCYTE [DISTWIDTH] IN BLOOD BY AUTOMATED COUNT: 16.8 % (ref 11.6–15.1)
FRACTIONAL SHORTENING: 28 % (ref 28–44)
GFR SERPL CREATININE-BSD FRML MDRD: 48 ML/MIN/1.73SQ M
GLUCOSE SERPL-MCNC: 106 MG/DL (ref 65–140)
HCT VFR BLD AUTO: 31.4 % (ref 34.8–46.1)
HGB BLD-MCNC: 9.6 G/DL (ref 11.5–15.4)
IMM GRANULOCYTES # BLD AUTO: 0.08 THOUSAND/UL (ref 0–0.2)
IMM GRANULOCYTES NFR BLD AUTO: 1 % (ref 0–2)
INR PPP: 1.85 (ref 0.84–1.19)
INTERVENTRICULAR SEPTUM IN DIASTOLE (PARASTERNAL SHORT AXIS VIEW): 0.8 CM
INTERVENTRICULAR SEPTUM: 0.8 CM (ref 0.64–1.2)
LEFT ATRIUM AREA SYSTOLE SINGLE PLANE A4C: 20.8 CM2
LEFT ATRIUM SIZE: 5.3 CM
LEFT INTERNAL DIMENSION IN SYSTOLE: 3.6 CM (ref 115.07–174.67)
LEFT VENTRICULAR INTERNAL DIMENSION IN DIASTOLE: 5 CM (ref 217.23–324.06)
LEFT VENTRICULAR POSTERIOR WALL IN END DIASTOLE: 0.8 CM (ref 0.63–1.19)
LEFT VENTRICULAR STROKE VOLUME: 63 ML
LVSV (TEICH): 63 ML
LYMPHOCYTES # BLD AUTO: 0.84 THOUSANDS/ΜL (ref 0.6–4.47)
LYMPHOCYTES NFR BLD AUTO: 8 % (ref 14–44)
MCH RBC QN AUTO: 24.4 PG (ref 26.8–34.3)
MCHC RBC AUTO-ENTMCNC: 30.6 G/DL (ref 31.4–37.4)
MCV RBC AUTO: 80 FL (ref 82–98)
MONOCYTES # BLD AUTO: 0.55 THOUSAND/ΜL (ref 0.17–1.22)
MONOCYTES NFR BLD AUTO: 5 % (ref 4–12)
NEUTROPHILS # BLD AUTO: 9.5 THOUSANDS/ΜL (ref 1.85–7.62)
NEUTS SEG NFR BLD AUTO: 84 % (ref 43–75)
NRBC BLD AUTO-RTO: 0 /100 WBCS
PLATELET # BLD AUTO: 351 THOUSANDS/UL (ref 149–390)
PMV BLD AUTO: 9.1 FL (ref 8.9–12.7)
POTASSIUM SERPL-SCNC: 3.7 MMOL/L (ref 3.5–5.3)
PROT SERPL-MCNC: 6.4 G/DL (ref 6.4–8.4)
PROTHROMBIN TIME: 21.1 SECONDS (ref 11.6–14.5)
RBC # BLD AUTO: 3.93 MILLION/UL (ref 3.81–5.12)
RIGHT ATRIUM AREA SYSTOLE A4C: 17.8 CM2
RIGHT VENTRICLE ID DIMENSION: 3.2 CM
SL CV LV EF: 55
SL CV PED ECHO LEFT VENTRICLE DIASTOLIC VOLUME (MOD BIPLANE) 2D: 116 ML
SL CV PED ECHO LEFT VENTRICLE SYSTOLIC VOLUME (MOD BIPLANE) 2D: 53 ML
SODIUM SERPL-SCNC: 133 MMOL/L (ref 135–147)
TR MAX PG: 20 MMHG
TR PEAK VELOCITY: 2.3 M/S
TRICUSPID VALVE PEAK REGURGITATION VELOCITY: 2.25 M/S
VANCOMYCIN TROUGH SERPL-MCNC: 24.6 UG/ML (ref 10–20)
WBC # BLD AUTO: 11.26 THOUSAND/UL (ref 4.31–10.16)
Z-SCORE OF ASCENDING AORTA: 0.63
Z-SCORE OF INTERVENTRICULAR SEPTUM IN END DIASTOLE: -0.86
Z-SCORE OF LEFT VENTRICULAR DIMENSION IN END DIASTOLE: -39.72
Z-SCORE OF LEFT VENTRICULAR DIMENSION IN END SYSTOLE: -29.04
Z-SCORE OF LEFT VENTRICULAR POSTERIOR WALL IN END DIASTOLE: -0.74

## 2022-05-08 PROCEDURE — 99233 SBSQ HOSP IP/OBS HIGH 50: CPT | Performed by: INTERNAL MEDICINE

## 2022-05-08 PROCEDURE — 93306 TTE W/DOPPLER COMPLETE: CPT | Performed by: INTERNAL MEDICINE

## 2022-05-08 PROCEDURE — 85025 COMPLETE CBC W/AUTO DIFF WBC: CPT | Performed by: INTERNAL MEDICINE

## 2022-05-08 PROCEDURE — 99232 SBSQ HOSP IP/OBS MODERATE 35: CPT | Performed by: INTERNAL MEDICINE

## 2022-05-08 PROCEDURE — 93306 TTE W/DOPPLER COMPLETE: CPT

## 2022-05-08 PROCEDURE — 80202 ASSAY OF VANCOMYCIN: CPT

## 2022-05-08 PROCEDURE — 87040 BLOOD CULTURE FOR BACTERIA: CPT

## 2022-05-08 PROCEDURE — 85610 PROTHROMBIN TIME: CPT | Performed by: INTERNAL MEDICINE

## 2022-05-08 PROCEDURE — 80053 COMPREHEN METABOLIC PANEL: CPT | Performed by: INTERNAL MEDICINE

## 2022-05-08 RX ADMIN — WARFARIN SODIUM 2 MG: 2 TABLET ORAL at 17:15

## 2022-05-08 RX ADMIN — ACETAMINOPHEN 650 MG: 325 TABLET ORAL at 01:59

## 2022-05-08 RX ADMIN — Medication 125 MG: at 11:21

## 2022-05-08 RX ADMIN — Medication 125 MG: at 21:27

## 2022-05-08 RX ADMIN — VANCOMYCIN HYDROCHLORIDE 2000 MG: 1 INJECTION, POWDER, LYOPHILIZED, FOR SOLUTION INTRAVENOUS at 21:52

## 2022-05-08 RX ADMIN — LEVOTHYROXINE SODIUM 125 MCG: 125 TABLET ORAL at 05:56

## 2022-05-08 RX ADMIN — FUROSEMIDE 40 MG: 40 TABLET ORAL at 11:21

## 2022-05-08 RX ADMIN — ALLOPURINOL 100 MG: 100 TABLET ORAL at 11:21

## 2022-05-08 RX ADMIN — ACETAMINOPHEN 650 MG: 325 TABLET ORAL at 15:54

## 2022-05-08 RX ADMIN — VANCOMYCIN HYDROCHLORIDE 2000 MG: 1 INJECTION, POWDER, LYOPHILIZED, FOR SOLUTION INTRAVENOUS at 11:21

## 2022-05-08 RX ADMIN — DULOXETINE HYDROCHLORIDE 20 MG: 20 CAPSULE, DELAYED RELEASE ORAL at 11:23

## 2022-05-08 NOTE — ASSESSMENT & PLAN NOTE
· Cellulitis of pannus on right side of abdomen, and left hip cellulitis  · Left Hip cellulitis may be in the setting of a prior acetabulum fracture;  Improving  · Worsening redness, and drainage from both sites  · Right abdomen cellulitis improving  · She has had history of MRSA in the past as well on RLE    Recent Labs     05/07/22  1015 05/08/22  0543 05/09/22  0505   WBC 13 61* 11 26* 10 42*     · Repeat blood cultures results pending  · Initial Blood Clx 2/2 Positive for Staph epidermidis, may be a true infection  · Susceptible to Vancomycin, Clindamycin and Gentamycin  · ID on board, follow recs   · Continue IV vanc  · Monitor WBC, vitals, demarcation of cellulitis

## 2022-05-08 NOTE — PROGRESS NOTES
Progress Note - Infectious Disease   Brandon Dalton 80 y o  female MRN: [id number]  Unit/Bed#: W -01 Encounter: [id number]      Impression/Plan:  1  Panniculitis  Patient's exam concerning for bilateral, lateral panniculitis  She has had prior history of MRSA at the sites  There does not appear to be further tracking past the groin  She is fortunately hemodynamically stable  Blood cultures positive as below  Exam improving  Continue IV vancomycin  Oral vancomycin prophylaxis as below  Continue to trend fever curve / vitals   Repeat CBC/chemistry tomorrow   Follow-up pending blood cultures   Repeat blood cultures ordered for today  2D echo ordered  Monitor clinical exam closely   Diuresis as per primary   Frequent pressure offloading   Additional supportive care as per primary   Additional interventions pending clinical course     2  Recently treated C diff colitis  Patient recently completed prolonged course of oral vancomycin  Current GI symptoms are likely related to problem 1  Low suspicion for recurrent C diff  And CT imaging without inflammatory changes  Patient without further GI complaints today  Antibiotics as above   Continue oral vancomycin to 125mg q 12 for prophylaxis  Monitor stool output   Monitor abdominal exam   Continue to trend fever curve / WBC      3  Morbid obesity and lymphedema  Patient's BMI noted to be 88  Unfortunately these are her main risk factors for recurrence  Additional supportive measures as per primary      4  Leukocytosis  This is due to the above  Patient is fortunately otherwise hemodynamically stable  Will continue antibiotics and plan for repeat labs as above  5  Coagulase-negative Staph bacteremia  Blood cultures collected on admission from separate sites have isolated coagulase-negative Staph  PCR with methicillin-resistance  Sources problem 1  Patient without intravascular devices    Presented rapidly with onset of symptoms and appears to be improving rapidly  Continue vancomycin as above  Continue oral vancomycin prophylaxis as above  Repeat blood cultures ordered for today  Continue to trend fever curve/vitals  Repeat labs tomorrow  2D echo ordered for completeness  If imaging unremarkable and cultures clear rapidly anticipate 2 weeks of antibiotics for this issue  Above plan discussed in detail with the patient and with primary service  ID consult service will continue to follow  Antibiotics:  Vancomycin 2  Oral vancomycin prophylaxis ongoing    Subjective:  Patient has no fever, chills, sweats; no nausea, vomiting, diarrhea; no cough, shortness of breath  No new symptoms  Tolerating current antibiotic without itching or rash  She is unable to see if the redness in her pannus has improved  Objective:  Vitals:  Temp:  [97 3 °F (36 3 °C)-98 2 °F (36 8 °C)] 97 3 °F (36 3 °C)  HR:  [76-88] 76  Resp:  [18] 18  BP: ()/(53-59) 92/53  SpO2:  [93 %-97 %] 93 %  Temp (24hrs), Av 9 °F (36 6 °C), Min:97 3 °F (36 3 °C), Max:98 2 °F (36 8 °C)  Current: Temperature: (!) 97 3 °F (36 3 °C)    Physical Exam:   General Appearance:  Alert, interactive, nontoxic, no acute distress  Throat: Oropharynx moist without lesions  Lungs:   Decreased breath sounds throughout, no wheezes, rales or rhonchi   Heart:  RRR; no murmur, rub or gallop appreciated though distant heart sounds   Abdomen:   Soft, non-tender, non-distended, positive bowel sounds  Extremities: No clubbing, cyanosis or edema   Skin: No new rashes or lesions  No new draining wounds noted  Previously seen areas of erythema and induration on the bilateral pannus laterally are improving  They are regressing from the drawn lines  No obvious fluctuance         Labs, Imaging, & Other studies:   All pertinent labs and imaging studies were personally reviewed  Results from last 7 days   Lab Units 22  0543 22  1015 22  1216   WBC Thousand/uL 11 26* 13 61* 19 38* HEMOGLOBIN g/dL 9 6* 10 8* 11 4*   PLATELETS Thousands/uL 351 359 402*     Results from last 7 days   Lab Units 05/08/22  0543 05/07/22  1015 05/06/22  1216   POTASSIUM mmol/L 3 7   < > 4 0   CHLORIDE mmol/L 100   < > 96   CO2 mmol/L 28   < > 30   BUN mg/dL 26*   < > 20   CREATININE mg/dL 1 14   < > 1 15   EGFR ml/min/1 73sq m 48   < > 48   CALCIUM mg/dL 8 0*   < > 8 6   AST U/L 16  --  20   ALT U/L 11  --  12   ALK PHOS U/L 88  --  115*    < > = values in this interval not displayed       Results from last 7 days   Lab Units 05/06/22  1401   GRAM STAIN RESULT  Gram positive cocci in clusters*  Gram positive cocci in clusters*

## 2022-05-08 NOTE — ASSESSMENT & PLAN NOTE
· Noted history of chronic atrial fibrillation    · Anticoagulated on warfarin    Recent Labs     05/07/22  1015 05/08/22  0543 05/09/22  0505   INR 2 02* 1 85* 1 92*       · Rates controlled  · Daily INR; give one time increased dose of 4 mg on 5/9 and then resume home regimen

## 2022-05-08 NOTE — ASSESSMENT & PLAN NOTE
· History of C diff infection back in January again noted last month  · On p o  Vancomycin    · Will continue p o  Vancomycin while on IV antibiotics for cellulitis of the abdominal wall  · C   Diff Panel ordered; discontinue contact precautions if negative

## 2022-05-08 NOTE — PROGRESS NOTES
[facility]  Progress Note - Tylor Perry 1950, 80 y o  female MRN: [id number]  Unit/Bed#: W -01 Encounter: [id number]  Primary Care Provider: ORACIO Hooper   Date and time admitted to hospital: 5/6/2022  5:48 PM    * Cellulitis  Assessment & Plan  · Cellulitis of pannus on right side of abdomen, and left hip cellulitis  · Left Hip cellulitis may be in the setting of a past acetabulum fracture  · Patient noted worsening redness, and drainage from both sites  · Patient has named left hip cellulitis "Wadell Murrain and he's a leaker", and right abdomen cellulitis "Tiny"  Canceling Psych consult  · History of C diff in the past recently finished p o  Vancomycin  · She has had history of MRSA in the past as well on right leg  · IV vancomycin for now with prophylaxis 125 mg p o  Vancomycin q 6 hours for C diff  · Cefepime discontinued   · Blood cultures revealed possible skin contaminant (pulled from same site/arm), so will repeat blood cultures from separate sites on 05/08  · WBC now 13 61  · Follow up repeat blood cultures   · ID on board, follow recs   · Continue IV vancomycin  · Monitor WBC, vitals, demarcation of cellulitis         Chronic atrial fibrillation (HCC)  Assessment & Plan  · Noted history of chronic atrial fibrillation  · Anticoagulated on warfarin  · INR therapeutic 2 7  · Does not appear to be on rate control  · Daily INR    C  difficile colitis  Assessment & Plan  · History of C diff infection back in January again noted last month  · On p o  Vancomycin  · She does have significantly elevated white blood cell count 19  · Will continue p o   Vancomycin while on IV antibiotics for cellulitis of the abdominal wall  · Check C diff      Chronic diastolic congestive heart failure [facility])  Assessment & Plan  Wt Readings from Last 3 Encounters:   05/06/22 (!) 212 kg (467 lb 6 oz)   05/06/22 (!) 193 kg (425 lb)   04/04/22 (!) 181 kg (400 lb)     · Does not appear to be in acute exacerbation at this  · Continue home Lasix  Blood Pressure: 92/53        Extreme Obesity - BMI 70 and over, adult [facility])  Assessment & Plan  · Noted with BMI 88 31  · Recommend diet lifestyle modifications  · She would benefit from outpatient follow-up with bariatric surgery  · She is effectively bedbound    Other specified hypothyroidism  Assessment & Plan  · Continue levothyroxine      VTE Pharmacologic Prophylaxis: VTE Score: 5 High Risk (Score >/= 5) - Pharmacological DVT Prophylaxis Ordered: warfarin (Coumadin)  Sequential Compression Devices Ordered  Patient Centered Rounds: I performed bedside rounds with nursing staff today  Discussions with Specialists or Other Care Team Provider:     Education and Discussions with Family / Patient: Patient declined call to   Current Length of Stay: 2 day(s)  Current Patient Status: Inpatient   Discharge Plan: Anticipate discharge in 48-72 hrs to d    Code Status: Level 1 - Full Code    Subjective:   Patient seen and examined at bedside  She currently has no acute complaints  She denies chest pain, SOB, N/V, fever, chills, numbness/tingling, urinary symptoms  She endorses tenderness located at cellulitis sites on right and left abdomen with occasional burning sensation  Last BM yesterday  At baseline she is bedridden and not on O2 supplementation  Objective:     Vitals:   Temp (24hrs), Av 9 °F (36 6 °C), Min:97 3 °F (36 3 °C), Max:98 2 °F (36 8 °C)    Temp:  [97 3 °F (36 3 °C)-98 2 °F (36 8 °C)] 97 3 °F (36 3 °C)  HR:  [76-88] 76  Resp:  [18] 18  BP: ()/(53-59) 92/53  SpO2:  [93 %-97 %] 93 %  Body mass index is 88 31 kg/m²  Input and Output Summary (last 24 hours): Intake/Output Summary (Last 24 hours) at 2022 1310  Last data filed at 2022 1306  Gross per 24 hour   Intake 780 ml   Output --   Net 780 ml       Physical Exam:   Physical Exam  Vitals reviewed     Constitutional:       General: She is not in acute distress  Appearance: She is not toxic-appearing  HENT:      Head: Normocephalic and atraumatic  Mouth/Throat:      Mouth: Mucous membranes are moist       Pharynx: Oropharynx is clear  Eyes:      Extraocular Movements: Extraocular movements intact  Conjunctiva/sclera: Conjunctivae normal    Cardiovascular:      Rate and Rhythm: Normal rate and regular rhythm  Heart sounds: No murmur heard  Pulmonary:      Effort: Pulmonary effort is normal  No respiratory distress  Breath sounds: Normal breath sounds  No wheezing or rales  Comments: 2L O2 NC  Abdominal:      General: There is no distension  Tenderness: There is no abdominal tenderness  There is no guarding  Musculoskeletal:      Right lower leg: No edema  Left lower leg: No edema  Skin:     General: Skin is warm and dry  Comments: Tender/warm/erythematous rash right and left maintained within current demarcation borders   Neurological:      Mental Status: She is alert and oriented to person, place, and time  Mental status is at baseline  Psychiatric:         Mood and Affect: Mood normal          Behavior: Behavior normal            Additional Data:     Labs:  Results from last 7 days   Lab Units 05/08/22  0543 05/07/22  1015 05/06/22  1216   WBC Thousand/uL 11 26*   < > 19 38*   HEMOGLOBIN g/dL 9 6*   < > 11 4*   HEMATOCRIT % 31 4*   < > 39 6   PLATELETS Thousands/uL 351   < > 402*   BANDS PCT %  --   --  1   NEUTROS PCT % 84*   < >  --    LYMPHS PCT % 8*   < >  --    LYMPHO PCT %  --   --  2*   MONOS PCT % 5   < >  --    MONO PCT %  --   --  4   EOS PCT % 2   < > 0    < > = values in this interval not displayed       Results from last 7 days   Lab Units 05/08/22  0543   SODIUM mmol/L 133*   POTASSIUM mmol/L 3 7   CHLORIDE mmol/L 100   CO2 mmol/L 28   BUN mg/dL 26*   CREATININE mg/dL 1 14   ANION GAP mmol/L 5   CALCIUM mg/dL 8 0*   ALBUMIN g/dL 2 4*   TOTAL BILIRUBIN mg/dL 0 70   ALK PHOS U/L 88 ALT U/L 11   AST U/L 16   GLUCOSE RANDOM mg/dL 106     Results from last 7 days   Lab Units 05/08/22  0543   INR  1 85*             Results from last 7 days   Lab Units 05/07/22  1015 05/06/22  1401   LACTIC ACID mmol/L  --  1 0   PROCALCITONIN ng/ml 0 78*  --        Lines/Drains:  Invasive Devices  Report    Peripheral Intravenous Line            Peripheral IV 05/06/22 Right Antecubital 1 day                      Imaging: Reviewed radiology reports from this admission including: abdominal/pelvic CT    Recent Cultures (last 7 days):   Results from last 7 days   Lab Units 05/06/22  1401   BLOOD CULTURE  Staphylococcus epidermidis*  Staphylococcus coagulase negative*   GRAM STAIN RESULT  Gram positive cocci in clusters*  Gram positive cocci in clusters*       Last 24 Hours Medication List:   Current Facility-Administered Medications   Medication Dose Route Frequency Provider Last Rate    acetaminophen  650 mg Oral Q6H PRN Joann Crane PA-C      allopurinol  100 mg Oral Daily Mitchell Guerra PA-C      DULoxetine  20 mg Oral Daily Mitchell Guerra PA-C      furosemide  40 mg Oral Daily [facility]      levothyroxine  125 mcg Oral Early Morning Mitchell Guerra PA-C      vancomycin  2,000 mg Intravenous Q12H Mitchell Guerra PA-C 2,000 mg (05/08/22 1121)    vancomycin  125 mg Oral Q12H [address] Jaguar De Luna MD      warfarin  2 mg Oral Once per day on Sun Mon Wed Fri Mitchell Guerra PA-C      warfarin  4 mg Oral Once per day on Tue Thu Sat Joann Crane PA-C          Today, Patient Was Seen By: Mirta Hearn DO    **Please Note: This note may have been constructed using a voice recognition system  **

## 2022-05-08 NOTE — ASSESSMENT & PLAN NOTE
Wt Readings from Last 3 Encounters:   05/06/22 (!) 212 kg (467 lb 6 oz)   05/06/22 (!) 193 kg (425 lb)   04/04/22 (!) 181 kg (400 lb)     · Does not appear to be in acute exacerbation at this  · Continue home Lasix  Blood Pressure: 92/53

## 2022-05-08 NOTE — ASSESSMENT & PLAN NOTE
Wt Readings from Last 3 Encounters:   05/08/22 (!) 212 kg (467 lb)   05/06/22 (!) 193 kg (425 lb)   04/04/22 (!) 181 kg (400 lb)     · Does not appear to be in acute exacerbation at this  · Continue home Lasix with hold parameters for SBP < 110

## 2022-05-08 NOTE — ASSESSMENT & PLAN NOTE
Body mass index is 88 24 kg/m²       Recommend incorporating a more whole foods plant-predominant diet along with decreasing consumption of red meats and processed foods   Per AHA guidelines, recommend moderate-vigorous intensity exercise for 30 minutes a day for 5 days a week or a total of 150 min/week   PT/OT eval for

## 2022-05-08 NOTE — ASSESSMENT & PLAN NOTE
· Cellulitis of pannus on right side of abdomen, and left hip cellulitis  · Left Hip cellulitis may be in the setting of a past acetabulum fracture  · Patient noted worsening redness, and drainage from both sites  · Patient has named left hip cellulitis "Ganga Orozco and he's a leaker", and right abdomen cellulitis "Tiny"  · Consider Psych eval on monday  · History of C diff in the past recently finished p o  Vancomycin  · She has had history of MRSA in the past as well on right leg  · IV vancomycin for now with prophylaxis 125 mg p o   Vancomycin q 6 hours for C diff  · Starting Cefepime   · Blood cultures revealed possible skin contaminant (pulled from same site/arm), so will repeat blood cultures from separate sites on 05/08  · WBC now 13 61  · Follow up repeat blood cultures   · ID on board, follow recs   · Continue IV vancomycin  · Monitor WBC, vitals, demarcation of cellulitis

## 2022-05-09 LAB
ANION GAP SERPL CALCULATED.3IONS-SCNC: 5 MMOL/L (ref 4–13)
BACTERIA BLD CULT: ABNORMAL
BACTERIA BLD CULT: ABNORMAL
BUN SERPL-MCNC: 24 MG/DL (ref 5–25)
CALCIUM SERPL-MCNC: 8.2 MG/DL (ref 8.4–10.2)
CHLORIDE SERPL-SCNC: 100 MMOL/L (ref 96–108)
CO2 SERPL-SCNC: 29 MMOL/L (ref 21–32)
CREAT SERPL-MCNC: 1.08 MG/DL (ref 0.6–1.3)
ERYTHROCYTE [DISTWIDTH] IN BLOOD BY AUTOMATED COUNT: 17 % (ref 11.6–15.1)
GFR SERPL CREATININE-BSD FRML MDRD: 52 ML/MIN/1.73SQ M
GLUCOSE SERPL-MCNC: 84 MG/DL (ref 65–140)
GRAM STN SPEC: ABNORMAL
GRAM STN SPEC: ABNORMAL
HCT VFR BLD AUTO: 34.4 % (ref 34.8–46.1)
HGB BLD-MCNC: 9.8 G/DL (ref 11.5–15.4)
INR PPP: 1.92 (ref 0.84–1.19)
LACTATE SERPL-SCNC: 0.8 MMOL/L (ref 0.5–2)
MCH RBC QN AUTO: 24.2 PG (ref 26.8–34.3)
MCHC RBC AUTO-ENTMCNC: 28.5 G/DL (ref 31.4–37.4)
MCV RBC AUTO: 85 FL (ref 82–98)
MECA+MECC ISLT/SPM QL: DETECTED
PLATELET # BLD AUTO: 352 THOUSANDS/UL (ref 149–390)
PMV BLD AUTO: 9 FL (ref 8.9–12.7)
POTASSIUM SERPL-SCNC: 4.5 MMOL/L (ref 3.5–5.3)
PROTHROMBIN TIME: 21.7 SECONDS (ref 11.6–14.5)
RBC # BLD AUTO: 4.05 MILLION/UL (ref 3.81–5.12)
S EPIDERMIDIS DNA BLD POS QL NAA+NON-PRB: DETECTED
SODIUM SERPL-SCNC: 134 MMOL/L (ref 135–147)
WBC # BLD AUTO: 10.42 THOUSAND/UL (ref 4.31–10.16)

## 2022-05-09 PROCEDURE — 83605 ASSAY OF LACTIC ACID: CPT | Performed by: INTERNAL MEDICINE

## 2022-05-09 PROCEDURE — 85610 PROTHROMBIN TIME: CPT | Performed by: INTERNAL MEDICINE

## 2022-05-09 PROCEDURE — 99232 SBSQ HOSP IP/OBS MODERATE 35: CPT | Performed by: INTERNAL MEDICINE

## 2022-05-09 PROCEDURE — 80048 BASIC METABOLIC PNL TOTAL CA: CPT | Performed by: INTERNAL MEDICINE

## 2022-05-09 PROCEDURE — 85027 COMPLETE CBC AUTOMATED: CPT | Performed by: INTERNAL MEDICINE

## 2022-05-09 PROCEDURE — 87493 C DIFF AMPLIFIED PROBE: CPT | Performed by: INTERNAL MEDICINE

## 2022-05-09 PROCEDURE — 87081 CULTURE SCREEN ONLY: CPT | Performed by: PHYSICIAN ASSISTANT

## 2022-05-09 RX ORDER — WARFARIN SODIUM 4 MG/1
4 TABLET ORAL
Status: COMPLETED | OUTPATIENT
Start: 2022-05-09 | End: 2022-05-09

## 2022-05-09 RX ORDER — TRIAMCINOLONE ACETONIDE 1 MG/G
CREAM TOPICAL 2 TIMES DAILY
Status: DISCONTINUED | OUTPATIENT
Start: 2022-05-09 | End: 2022-05-13 | Stop reason: HOSPADM

## 2022-05-09 RX ORDER — WARFARIN SODIUM 2 MG/1
2 TABLET ORAL
Status: DISCONTINUED | OUTPATIENT
Start: 2022-05-11 | End: 2022-05-13 | Stop reason: HOSPADM

## 2022-05-09 RX ADMIN — LEVOTHYROXINE SODIUM 125 MCG: 125 TABLET ORAL at 05:03

## 2022-05-09 RX ADMIN — WARFARIN SODIUM 4 MG: 4 TABLET ORAL at 17:20

## 2022-05-09 RX ADMIN — TRIAMCINOLONE ACETONIDE: 1 CREAM TOPICAL at 11:33

## 2022-05-09 RX ADMIN — VANCOMYCIN HYDROCHLORIDE 1500 MG: 1 INJECTION, POWDER, LYOPHILIZED, FOR SOLUTION INTRAVENOUS at 13:59

## 2022-05-09 RX ADMIN — FUROSEMIDE 40 MG: 40 TABLET ORAL at 08:46

## 2022-05-09 RX ADMIN — TRIAMCINOLONE ACETONIDE: 1 CREAM TOPICAL at 17:20

## 2022-05-09 RX ADMIN — Medication 125 MG: at 21:21

## 2022-05-09 RX ADMIN — Medication 125 MG: at 08:46

## 2022-05-09 RX ADMIN — ALLOPURINOL 100 MG: 100 TABLET ORAL at 08:46

## 2022-05-09 RX ADMIN — DULOXETINE HYDROCHLORIDE 20 MG: 20 CAPSULE, DELAYED RELEASE ORAL at 08:46

## 2022-05-09 RX ADMIN — ACETAMINOPHEN 650 MG: 325 TABLET ORAL at 08:46

## 2022-05-09 NOTE — WOUND OSTOMY CARE
Progress Note - Wound   Lucia Farm 80 y o  female MRN: [id number]  Unit/Bed#: W -01 Encounter: [id number]      Assessment: This is a 80year old female patient admitted on 5/6/22 with cellulitis  She has a history of morbid obesity, recurrent cellulitis, C-diff colitis, AFIB, CHF and hypothyroidism  She was awake, alert & oriented x 3 with her caretaker at her bedside  The patient can be repositioned in bed with assist of 2 and is dependent upon nursing staff or caretaker for all of her needs  She has a Purewick in place and is continent of stool  Assessment Findings:  1  The patient has multiple fungal rashes to bilateral skin folds and under right breast  Wound care orders written &: patient has order for Kenalog  2  Sacrobuttock area intact  Orders written for prevention  3  Left heel has stage 1 pressure injury  Orders written for prevention - the patient is refusing to have heels elevated at this time  4  Cellulitic area to right pannus is draining large amounts of serous fluid  The patient is refusing to have area covered  A blue drainage pad was placed under pannus as per patient request     Plan:   1  Cleanse under breasts with foaming cleanser and pat dry well daily  Apply ABD under right breast and change daily & prn soilage/dislodgement  2  Cleanse bilateral skin folds to abdomen and inguinal areas with foaming cleanser & pat dry well  Apply small amount of Kenalog cream and place ABD pad to each skin fold  Change daily & prn soilage/dislodgement  3  Cleanse right pannus with foaming cleanser daily & pat dry  Place large blue drainage pad under pannus and change daily & prn excessive drainage (do not cover with bordered foam as per patient request)  4  Apply hydraguard to b/l heels, buttocks and sacrum BID and PRN for prevention and protection  5  Apply skin nourishing cream the entire skin daily for moisture  6  Turn and reposition patient every  2 hours   7   Elevate heels off of bed with pillows to offload pressure   8  Apply EHOB waffle cushion to chair when OOB, if able  9  The patient has a bariatric bed with low air-loss, pressure redistribution mattress in place      Wound 10/03/20 MASD Breast Right; Lower (Active)   Wound Image   05/09/22 1554   Wound Description Granulation tissue;Pink 05/09/22 1554   Alison-wound Assessment Hyperpigmented;Scar Tissue 05/09/22 1554   Wound Length (cm) 1 5 cm 05/09/22 1554   Wound Width (cm) 10 cm 05/09/22 1554   Wound Depth (cm) 0 1 cm 05/09/22 1554   Wound Surface Area (cm^2) 15 cm^2 05/09/22 1554   Wound Volume (cm^3) 1 5 cm^3 05/09/22 1554   Calculated Wound Volume (cm^3) 1 5 cm^3 05/09/22 1554   Treatments Cleansed 05/09/22 1554   Dressing ABD 05/09/22 1554   Dressing Status Clean;Dry; Intact 05/09/22 1554       Wound 11/18/21 Thigh Left;Lateral (Active)   Wound Image   05/09/22 1606   Wound Description Non-blanchable erythema 05/09/22 1606   Alison-wound Assessment Dry; Intact 05/09/22 1606   Wound Length (cm) 16 cm 05/09/22 1606   Wound Width (cm) 14 cm 05/09/22 1606   Wound Depth (cm) 0 cm 05/09/22 1606   Wound Surface Area (cm^2) 224 cm^2 05/09/22 1606   Wound Volume (cm^3) 0 cm^3 05/09/22 1606   Calculated Wound Volume (cm^3) 0 cm^3 05/09/22 1606   Drainage Amount None 05/09/22 1606   Non-staged Wound Description Not applicable 52/66/09 2983   Dressing Status Clean;Dry; Intact 05/09/22 1606       Wound 04/19/22 MASD Pelvis Anterior (Active)   Wound Image   05/09/22 1607   Wound Description Epithelialization 05/09/22 1607   Alison-wound Assessment Intact 05/09/22 1607   Wound Length (cm) 0 cm 05/09/22 1607   Wound Width (cm) 0 cm 05/09/22 1607   Wound Depth (cm) 0 cm 05/09/22 1607   Wound Surface Area (cm^2) 0 cm^2 05/09/22 1607   Wound Volume (cm^3) 0 cm^3 05/09/22 1607   Calculated Wound Volume (cm^3) 0 cm^3 05/09/22 1607   Drainage Amount Scant 05/09/22 1607   Drainage Description Serous 05/09/22 1607   Treatments Other (Comment) 05/09/22 1607 Dressing ABD 05/09/22 1607   Dressing Status Clean;Dry; Intact 05/09/22 1607       Wound 04/20/22 Pressure Injury Thigh Anterior;Right (Active)   Wound Image   05/09/22 1601   Wound Description Epithelialization 05/09/22 1601   Alison-wound Assessment Intact; [place] 05/09/22 1601   Wound Length (cm) 0 cm 05/09/22 1601   Wound Width (cm) 0 cm 05/09/22 1601   Wound Depth (cm) 0 cm 05/09/22 1601   Wound Surface Area (cm^2) 0 cm^2 05/09/22 1601   Wound Volume (cm^3) 0 cm^3 05/09/22 1601   Calculated Wound Volume (cm^3) 0 cm^3 05/09/22 1601   Drainage Amount Scant 05/09/22 1601   Drainage Description Serous 05/09/22 1601   Dressing ABD 05/09/22 1601   Dressing Status Clean;Dry; Intact 05/09/22 1601       Wound 05/09/22 Heel Left (Active)   Wound Image   05/09/22 1616   Pressure Injury Stage 1 05/09/22 1616   Wound Length (cm) 10 cm 05/09/22 1616   Wound Width (cm) 4 cm 05/09/22 1616   Wound Depth (cm) 0 cm 05/09/22 1616   Wound Surface Area (cm^2) 40 cm^2 05/09/22 1616   Wound Volume (cm^3) 0 cm^3 05/09/22 1616   Calculated Wound Volume (cm^3) 0 cm^3 05/09/22 1616   Drainage Amount None 05/09/22 1616   Dressing Moisture barrier 05/09/22 1616     Discussed assessment findings, and plan of care/recommendations with Malika Arce RN      Wound care will follow along with patient throughout admission, please call or tiger text with questions and concerns    Recommendations written as orders      Sharda Renteria RN, BSN, [facility]

## 2022-05-09 NOTE — DISCHARGE INSTR - OTHER ORDERS
Plan:     1  Cleanse under breasts with foaming cleanser and pat dry well daily  Apply ABD under right breast and change daily & prn soilage/dislodgement  2  Cleanse bilateral skin folds to abdomen and inguinal areas with foaming cleanser & pat dry well  Apply small amount of Kenalog cream 2x/day and place ABD pad to each skin fold  Change ABD pad daily & prn soilage/dislodgement  3  Cleanse right pannus with foaming cleanser daily & pat dry  Place large blue drainage pad under pannus and change daily & prn excessive drainage (do not cover with bordered foam as per patient request)  4  Apply hydraguard to b/l heels, buttocks and sacrum BID and PRN for prevention and protection  5  Apply skin nourishing cream the entire skin daily for moisture  6  Turn and reposition patient every  2 hours   7  Elevate heels off of bed with pillows to offload pressure   8   Apply bariatric pressure redistribution cushion to chair when OOB, if able

## 2022-05-09 NOTE — PROGRESS NOTES
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 as well on RLE    Recent Labs     05/07/22  1015 05/08/22  0543 05/09/22  0505   WBC 13 61* 11 26* 10 42*     · Repeat blood cultures results pending  · Initial Blood Clx 2/2 Positive for Staph epidermidis, may be a true infection  · Susceptible to Vancomycin, Clindamycin and Gentamycin  · ID on board, follow recs   · Continue IV vanc  · Monitor WBC, vitals, demarcation of cellulitis         Chronic atrial fibrillation (HCC)  Assessment & Plan  · Noted history of chronic atrial fibrillation  · Anticoagulated on warfarin    Recent Labs     05/07/22  1015 05/08/22  0543 05/09/22  0505   INR 2 02* 1 85* 1 92*       · Rates controlled  · Daily INR; give one time increased dose of 4 mg on 5/9 and then resume home regimen    C  difficile colitis  Assessment & Plan  · History of C diff infection back in January again noted last month  · On p o  Vancomycin    · Will continue p o  Vancomycin while on IV antibiotics for cellulitis of the abdominal wall  · C   Diff Panel ordered; discontinue contact precautions if negative    Chronic diastolic congestive heart failure (HCC)  Assessment & Plan  Wt Readings from Last 3 Encounters:   05/08/22 (!) 212 kg (467 lb)   05/06/22 (!) 193 kg (425 lb)   04/04/22 (!) 181 kg (400 lb)     · Does not appear to be in acute exacerbation at this  · Continue home Lasix with hold parameters for SBP < 110        Extreme Obesity - BMI 70 and over, adult [facility])  Assessment & Plan  Body mass index is 88 24 kg/m²   Recommend incorporating a more whole foods plant-predominant diet along with decreasing consumption of red meats and processed foods   Per AHA guidelines, recommend moderate-vigorous intensity exercise for 30 minutes a day for 5 days a week or a total of 150 min/week   PT/OT eval for     Other specified hypothyroidism  Assessment & Plan  · Continue levothyroxine           VTE Pharmacologic Prophylaxis:   VTE Score: 5 High Risk (Score >/= 5) - Pharmacological DVT Prophylaxis Ordered: Enoxaparin (Lovenox)  Sequential Compression Devices Ordered  Mechanical VTE Prophylaxis in Place: No    Patient Centered Rounds: I have performed bedside rounds with nursing staff today  Discussions with Specialists or Other Care Team Provider: Pharmacy, Nursing Team, Infectious Disease    Education and Discussions with Family / Patient: Patient declined call to   Current Length of Stay: 3 day(s)    Current Patient Status: Inpatient     Discharge Plan / Estimated Discharge Date: SLIM is following this patient on consult  They are not yet medically stable for discharge  Code Status: Level 1 - Full Code      Subjective:   Patient reports feeling some frustration with her current situation but understands what and why all medications are being given to her  She had just finished a round of PO vancomycin before being admitted to the hospital, for C  Diff colitis and understand that she needs to continue taking it while being treated for her cellulitis  She reports "feeling fine" and is eating and drinking normally  Both cellulitis patches are leaky  The right hip cellulitis drains clear "syrupy" liquid as described by the patient  The left abdomen is draining thicker yellow liquid  Objective:   Patient's cellulitis patches appear to have shrunken   She is in no apparent distress and maintains a very positive attitude  She exhibits great communication with her physicians and is very pleasant to care for  Vitals:   Temp (24hrs), Av 8 °F (36 °C), Min:96 3 °F (35 7 °C), Max:97 6 °F (36 4 °C)    Temp:  [96 3 °F (35 7 °C)-97 6 °F (36 4 °C)] 97 6 °F (36 4 °C)  HR:  [64-72] 64  Resp:  [17-19] 17  BP: ()/(52-64) 88/64  SpO2:  [95 %-98 %] 95 %  Body mass index is 88 24 kg/m²  Input and Output Summary (last 24 hours): Intake/Output Summary (Last 24 hours) at 2022 1449  Last data filed at 2022 0931  Gross per 24 hour   Intake 2020 ml   Output 900 ml   Net 1120 ml       Physical Exam:     Physical Exam  Vitals and nursing note reviewed  Constitutional:       Appearance: Normal appearance  She is obese  HENT:      Head: Normocephalic and atraumatic  Right Ear: External ear normal       Left Ear: External ear normal    Eyes:      Conjunctiva/sclera: Conjunctivae normal    Cardiovascular:      Rate and Rhythm: Normal rate  Rhythm irregularly irregular  Pulses: Normal pulses  Radial pulses are 2+ on the right side and 2+ on the left side  Dorsalis pedis pulses are 2+ on the right side and 2+ on the left side  Heart sounds: Normal heart sounds  Pulmonary:      Effort: Pulmonary effort is normal  No respiratory distress  Breath sounds: Normal breath sounds  Abdominal:      General: Abdomen is protuberant  Palpations: Abdomen is soft  Skin:     General: Skin is warm and dry  Capillary Refill: Capillary refill takes less than 2 seconds  Comments: Large area of cellulitis on left hip  Smaller cellulitis on right abdominal fold   Neurological:      Mental Status: She is alert     Psychiatric:         Mood and Affect: Mood normal          Behavior: Behavior normal           Additional Data:     Labs:  Results from last 7 days   Lab Units 22  0505 22  0543 22  0543 22  1015 22  1216   WBC Thousand/uL 10 42* < > 11 26*   < > 19 38*   HEMOGLOBIN g/dL 9 8*   < > 9 6*   < > 11 4*   HEMATOCRIT % 34 4*   < > 31 4*   < > 39 6   PLATELETS Thousands/uL 352   < > 351   < > 402*   BANDS PCT %  --   --   --   --  1   NEUTROS PCT %  --   --  84*   < >  --    LYMPHS PCT %  --   --  8*   < >  --    LYMPHO PCT %  --   --   --   --  2*   MONOS PCT %  --   --  5   < >  --    MONO PCT %  --   --   --   --  4   EOS PCT %  --   --  2   < > 0    < > = values in this interval not displayed  Results from last 7 days   Lab Units 05/09/22  0505 05/08/22  0543 05/08/22  0543   SODIUM mmol/L 134*   < > 133*   POTASSIUM mmol/L 4 5   < > 3 7   CHLORIDE mmol/L 100   < > 100   CO2 mmol/L 29   < > 28   BUN mg/dL 24   < > 26*   CREATININE mg/dL 1 08   < > 1 14   ANION GAP mmol/L 5   < > 5   CALCIUM mg/dL 8 2*   < > 8 0*   ALBUMIN g/dL  --   --  2 4*   TOTAL BILIRUBIN mg/dL  --   --  0 70   ALK PHOS U/L  --   --  88   ALT U/L  --   --  11   AST U/L  --   --  16   GLUCOSE RANDOM mg/dL 84   < > 106    < > = values in this interval not displayed  Results from last 7 days   Lab Units 05/09/22  0505   INR  1 92*             Results from last 7 days   Lab Units 05/07/22  1015 05/06/22  1401   LACTIC ACID mmol/L  --  1 0   PROCALCITONIN ng/ml 0 78*  --        Imaging: Reviewed radiology reports from this admission including: abdominal/pelvic CT scan    Recent Cultures (last 7 days):     Results from last 7 days   Lab Units 05/08/22  1134 05/08/22  1133 05/06/22  1401   BLOOD CULTURE  Received in Microbiology Lab  Culture in Progress  Received in Microbiology Lab  Culture in Progress   Staphylococcus epidermidis*  Staphylococcus epidermidis*   GRAM STAIN RESULT   --   --  Gram positive cocci in clusters*  Gram positive cocci in clusters*       Lines/Drains:  Invasive Devices  Report    Peripheral Intravenous Line            Peripheral IV 05/09/22 Right;Ventral (anterior) Forearm <1 day                Telemetry:        Last 24 Hours Medication List: Current Facility-Administered Medications   Medication Dose Route Frequency Provider Last Rate    acetaminophen  650 mg Oral Q6H PRN Kindra Kowalski PA-C      allopurinol  100 mg Oral Daily Mitchell Guerra PA-C      DULoxetine  20 mg Oral Daily Mitchell Guerra PA-C      furosemide  40 mg Oral Daily Mitchell [facility] plata      levothyroxine  125 mcg Oral Early Morning Mitchell Guerra PA-C      triamcinolone   Topical BID Rom Juárez DO      vancomycin  1,500 mg Intravenous Q12H Nkechi Crump MD      vancomycin  125 mg Oral Q12H [address] Nkechi Crump MD      [START ON 5/11/2022] warfarin  2 mg Oral Once per day on Sun Mon Wed Fri Rom Juárez DO      warfarin  4 mg Oral Once per day on Tue Thu Sat Kindra Kowalski PA-C      warfarin  4 mg Oral Once (warfarin) Naye Thomason DO          Note generated by Resident and [facility], DO MSIII

## 2022-05-09 NOTE — PROGRESS NOTES
Vancomycin IV Pharmacy-to-Dose Consultation    Adriane Reddy is a 80 y o  female who is currently receiving Vancomycin IV with management by the Pharmacy Consult service  Assessment/Plan:  The patient was reviewed  Renal function is stable and no signs or symptoms of nephrotoxicity and/or infusion reactions were documented in the chart  Trough prior to 2200 dose on 05/08 came back as 24 6 with extrapolated trough being 22 3  The dose was reduced to 1500 mg IV q12h to start 16 hours after the 2200 dose on 05/8  Will check the next level on 05/10 @1330  We will continue to follow the patients culture results and clinical progress daily      Malachi Gonzalez, Pharmacist

## 2022-05-09 NOTE — PROGRESS NOTES
Progress Note - Infectious Disease   Rhona Case 80 y o  female MRN: [id number]  Unit/Bed#: W -01 Encounter: [id number]      Impression/Recommendations:  1  Panniculitis/lower abdominal cellulitis  Cellulitis has the appearance of streptococcal infection  Patient is clinically improved, but only slowly  Slow improvement is likely in this morbidly obese patient with panniculitis, due to low blood flow to adipose tissue  With presence of Staphylococcus epidermidis bacteremia (see below), will treat patient with vancomycin  Continue IV vancomycin for now  Serial exams  Monitor temperature/WBC  When patient is further improved, will transition to p o  Doxycycline/Keflex  I suspect patient will need a few more days of IV antibiotics yet  Treat x at least 14 days total antibiotic  2  MRSE bacteremia  Although there was growth in both admission blood cultures, I suspect these are contaminated blood draw  Patient has very difficult blood drawn IV access  She has no indwelling intravascular foreign body  However, IV vancomycin for panniculitis above should provide coverage  IV vancomycin, as in above  Follow-up on repeat blood cultures  3  Recently treated C difficile colitis  No evidence of active colitis  Will keep patient on p o  Vancomycin prophylaxis  Continue p o  Vancomycin prophylaxis while on systemic antibiotic  4  Morbid obesity  It would be difficult to obtain adequate antibiotic level via oral route in this patient  She will need a somewhat prolonged IV antibiotic course  Discussed with patient in detail regarding the above plan  Discussed with slim service  Antibiotics:  Vancomycin # 3     Subjective:  Patient with slightly improved no abdominal pain  Temperature stays down  No chills  She is tolerating antibiotic well  No nausea, vomiting or diarrhea      Objective:  Vitals:  Temp:  [96 4 °F (35 8 °C)-97 6 °F (36 4 °C)] 97 6 °F (36 4 °C)  HR:  [64-72] 64  Resp:  [17-19] 17  BP: (88-96)/(53-64) 88/64  SpO2:  [95 %-96 %] 95 %  Temp (24hrs), Av °F (36 1 °C), Min:96 4 °F (35 8 °C), Max:97 6 °F (36 4 °C)  Current: Temperature: 97 6 °F (36 4 °C)    Physical Exam:     General: Awake, alert, cooperative, no distress  Neck:  Supple  No mass  No lymphadenopathy  Lungs: Expansion symmetric, no rales, no wheezing, respirations unlabored  Heart:  Regular rate and rhythm, S1 and S2 normal, no murmur  Abdomen: Soft, obese  Slight improvement in lower abdominal erythema/warmth/tenderness  No purulence  Extremities: No edema  No erythema/warmth  No ulcer  Nontender to palpation  Skin:  No rash  Neuro: Moves all extremities  Invasive Devices  Report    Peripheral Intravenous Line            Peripheral IV 22 Right;Ventral (anterior) Forearm <1 day                Labs studies:   I have personally reviewed pertinent labs  Results from last 7 days   Lab Units 22  0505 22  0543 22  1015 22  1216 22  1216   POTASSIUM mmol/L 4 5 3 7 3 9   < > 4 0   CHLORIDE mmol/L 100 100 98   < > 96   CO2 mmol/L 29 28 27   < > 30   BUN mg/dL 24 26* 22   < > 20   CREATININE mg/dL 1 08 1 14 1 05   < > 1 15   EGFR ml/min/1 73sq m 52 48 53   < > 48   CALCIUM mg/dL 8 2* 8 0* 8 2*   < > 8 6   AST U/L  --  16  --   --  20   ALT U/L  --  11  --   --  12   ALK PHOS U/L  --  88  --   --  115*    < > = values in this interval not displayed  Results from last 7 days   Lab Units 22  0505 22  0543 22  1015   WBC Thousand/uL 10 42* 11 26* 13 61*   HEMOGLOBIN g/dL 9 8* 9 6* 10 8*   PLATELETS Thousands/uL 352 351 359     Results from last 7 days   Lab Units 22  1134 22  1133 22  1401   BLOOD CULTURE  Received in Microbiology Lab  Culture in Progress  Received in Microbiology Lab  Culture in Progress   Staphylococcus epidermidis*  Staphylococcus epidermidis*   GRAM STAIN RESULT   --   --  Gram positive cocci in clusters* Gram positive cocci in clusters*       Imaging Studies:   I have personally reviewed pertinent imaging study reports and images in PACS  EKG, Pathology, and Other Studies:   I have personally reviewed pertinent reports

## 2022-05-10 LAB
ANION GAP SERPL CALCULATED.3IONS-SCNC: 6 MMOL/L (ref 4–13)
ATRIAL RATE: 72 BPM
BUN SERPL-MCNC: 27 MG/DL (ref 5–25)
C DIFF TOX GENS STL QL NAA+PROBE: NEGATIVE
CALCIUM SERPL-MCNC: 8.3 MG/DL (ref 8.4–10.2)
CHLORIDE SERPL-SCNC: 100 MMOL/L (ref 96–108)
CO2 SERPL-SCNC: 29 MMOL/L (ref 21–32)
CREAT SERPL-MCNC: 1.06 MG/DL (ref 0.6–1.3)
ERYTHROCYTE [DISTWIDTH] IN BLOOD BY AUTOMATED COUNT: 16.8 % (ref 11.6–15.1)
GFR SERPL CREATININE-BSD FRML MDRD: 53 ML/MIN/1.73SQ M
GLUCOSE SERPL-MCNC: 112 MG/DL (ref 65–140)
HCT VFR BLD AUTO: 37.5 % (ref 34.8–46.1)
HGB BLD-MCNC: 10.8 G/DL (ref 11.5–15.4)
INR PPP: 1.81 (ref 0.84–1.19)
MCH RBC QN AUTO: 24.4 PG (ref 26.8–34.3)
MCHC RBC AUTO-ENTMCNC: 28.8 G/DL (ref 31.4–37.4)
MCV RBC AUTO: 85 FL (ref 82–98)
PLATELET # BLD AUTO: 411 THOUSANDS/UL (ref 149–390)
PMV BLD AUTO: 8.9 FL (ref 8.9–12.7)
POTASSIUM SERPL-SCNC: 3.8 MMOL/L (ref 3.5–5.3)
PROTHROMBIN TIME: 20.8 SECONDS (ref 11.6–14.5)
QRS AXIS: 6 DEGREES
QRSD INTERVAL: 68 MS
QT INTERVAL: 340 MS
QTC INTERVAL: 401 MS
RBC # BLD AUTO: 4.43 MILLION/UL (ref 3.81–5.12)
SODIUM SERPL-SCNC: 135 MMOL/L (ref 135–147)
T WAVE AXIS: 160 DEGREES
VANCOMYCIN TROUGH SERPL-MCNC: 39.8 UG/ML (ref 10–20)
VENTRICULAR RATE: 84 BPM
WBC # BLD AUTO: 7.82 THOUSAND/UL (ref 4.31–10.16)

## 2022-05-10 PROCEDURE — 80048 BASIC METABOLIC PNL TOTAL CA: CPT | Performed by: INTERNAL MEDICINE

## 2022-05-10 PROCEDURE — 85610 PROTHROMBIN TIME: CPT | Performed by: INTERNAL MEDICINE

## 2022-05-10 PROCEDURE — 99232 SBSQ HOSP IP/OBS MODERATE 35: CPT | Performed by: INTERNAL MEDICINE

## 2022-05-10 PROCEDURE — 85027 COMPLETE CBC AUTOMATED: CPT | Performed by: INTERNAL MEDICINE

## 2022-05-10 PROCEDURE — 93010 ELECTROCARDIOGRAM REPORT: CPT | Performed by: INTERNAL MEDICINE

## 2022-05-10 PROCEDURE — 80202 ASSAY OF VANCOMYCIN: CPT | Performed by: INTERNAL MEDICINE

## 2022-05-10 RX ORDER — LANOLIN ALCOHOL/MO/W.PET/CERES
3 CREAM (GRAM) TOPICAL
Status: DISCONTINUED | OUTPATIENT
Start: 2022-05-10 | End: 2022-05-10

## 2022-05-10 RX ORDER — LANOLIN ALCOHOL/MO/W.PET/CERES
3 CREAM (GRAM) TOPICAL
Status: DISCONTINUED | OUTPATIENT
Start: 2022-05-10 | End: 2022-05-13 | Stop reason: HOSPADM

## 2022-05-10 RX ADMIN — ACETAMINOPHEN 650 MG: 325 TABLET ORAL at 13:57

## 2022-05-10 RX ADMIN — Medication 125 MG: at 10:17

## 2022-05-10 RX ADMIN — TRIAMCINOLONE ACETONIDE: 1 CREAM TOPICAL at 09:07

## 2022-05-10 RX ADMIN — LEVOTHYROXINE SODIUM 125 MCG: 125 TABLET ORAL at 05:18

## 2022-05-10 RX ADMIN — ALLOPURINOL 100 MG: 100 TABLET ORAL at 09:02

## 2022-05-10 RX ADMIN — DULOXETINE HYDROCHLORIDE 20 MG: 20 CAPSULE, DELAYED RELEASE ORAL at 09:05

## 2022-05-10 RX ADMIN — ACETAMINOPHEN 650 MG: 325 TABLET ORAL at 05:18

## 2022-05-10 RX ADMIN — WARFARIN SODIUM 4 MG: 4 TABLET ORAL at 18:22

## 2022-05-10 RX ADMIN — VANCOMYCIN HYDROCHLORIDE 1500 MG: 1 INJECTION, POWDER, LYOPHILIZED, FOR SOLUTION INTRAVENOUS at 02:51

## 2022-05-10 RX ADMIN — Medication 125 MG: at 21:55

## 2022-05-10 NOTE — ASSESSMENT & PLAN NOTE
Body mass index is 88 24 kg/m²   Recommend incorporating a more whole foods plant-predominant diet along with decreasing consumption of red meats and processed foods   Consider intermittent fasting/keto diet   Per AHA guidelines, recommend moderate-vigorous intensity exercise for 30 minutes a day for 5 days a week or a total of 150 min/week   PT/OT eval - patient at baseline, and does not require PT/OT eval and treat    Dispo planning - return home with help    Caregiver confirms Lestine Party she is able to assess patient at home and is aware of patient's arrival

## 2022-05-10 NOTE — ASSESSMENT & PLAN NOTE
· History of C diff infection back in January again noted last month  · C  Diff Panel negative     · On p o  Vancomycin - Will continue p o  Vancomycin while on IV antibiotics for cellulitis of the abdominal wall  · Were require prophylaxis with p o   Vancomycin up to 2 days after of completing treatment for cellulitis (through 05/22/2022)

## 2022-05-10 NOTE — PHYSICAL THERAPY NOTE
PHYSICAL THERAPY SCREEN NOTE          Patient Name: Rhona Case  [id number] Date: 5/10/2022          05/10/22 0840   PT Last Visit   PT Visit Date 05/10/22   Note Type   Note type Screen   Additional Comments PT orders received, chart review performed  Spoke to [employer]  Entered pt's room to education on role of PT in acute care to assess mobility and assist w/ DC recommendation  Pt reports she does not get OOB at home at baseline, requires assistance from 1-2 caregivers to perform bed mobility (rolling), and performs therapeutic exercises 3x/day w/ light weights  Pt appears to be functioning at mobility baseline and does not have acute skilled PT needs at this time  Will screen and DC pt from Inpatient PT caseload due to pt w/o acute PT needs           Danis Redd, PT, DPT  05/10/22

## 2022-05-10 NOTE — OCCUPATIONAL THERAPY NOTE
Occupational Therapy Screen Note         Patient Name: Jeffrey Spears  JYIDV'R Date: 5/10/2022         05/10/22 0800   OT Last Visit   OT Visit Date 05/10/22   Note Type   Note type Screen   Additional Comments OT orders received  Chart review performed  Based on conversation with pt appears to be performing at functional baseline  Pt reports that she stays in bed at home, does not get OOB at any time  Is Ax1-2 for rolling at home with the assist of her caregivers  Does have assistance with all LB tasks including bathing, toileting and dressing  Pt reports that she is able to complete UB tasks and UE exercises mod I  Pt is at her functional baseline and does not demonstrate need for skilled OT at this time  Pt will not be seen further by OT services  DC OT orders           Mariann Sandifer, MALVIN/LACEY

## 2022-05-10 NOTE — PROGRESS NOTES
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  · Cellulitis has shrunk significantly on both sides as of 5/10/22    Recent Labs     05/08/22  0543 05/09/22  0505 05/10/22  0526   WBC 11 26* 10 42* 7 82     · Repeat blood cultures results -ve at 24 hrs   · Initial Blood Clx 2/2 Positive for Staph epidermidis, may be a true infection  · Susceptible to Vancomycin, Clindamycin and Gentamycin  · ID on board, appreciate the following rec  · Continue IV vanc as it will cover all of the suspected pathogens  · Due to body size, IV course of antibiotics may need to be longer than usual - 14 days, through 5/20   · Plan to transition to PO abx 5/13 Doxycycline/Keflex  · Monitor WBC, vitals, demarcation of cellulitis    Chronic atrial fibrillation (HCC)  Assessment & Plan  · Noted history of chronic atrial fibrillation  · Anticoagulated on warfarin    Recent Labs     05/08/22  0543 05/09/22  0505 05/10/22  0526   INR 1 85* 1 92* 1 81*     Received one time increased dose of 4 mg on 5/9 and then resumed home regimen    · Rates controlled  · Daily INR   · Continue warfarin      C  difficile colitis  Assessment & Plan  · History of C diff infection back in January again noted last month  · C  Diff Panel negative     · On p o  Vancomycin - Will continue p o  Vancomycin while on IV antibiotics for cellulitis of the abdominal wall  · Were require prophylaxis with p o   Vancomycin up to 2 days after of completing treatment for cellulitis (through 05/22/2022)    Chronic diastolic congestive heart failure (HCC)  Assessment & Plan  Wt Readings from Last 3 Encounters:   05/08/22 (!) 212 kg (467 lb)   05/06/22 (!) 193 kg (425 lb)   04/04/22 (!) 181 kg (400 lb)     · Does not appear to be in acute exacerbation at this  · Continue home Lasix with hold parameters for SBP < 110      Extreme Obesity - BMI 70 and over, adult [facility])  Assessment & Plan  Body mass index is 88 24 kg/m²   Recommend incorporating a more whole foods plant-predominant diet along with decreasing consumption of red meats and processed foods   Per AHA guidelines, recommend moderate-vigorous intensity exercise for 30 minutes a day for 5 days a week or a total of 150 min/week   PT/OT eval - patient at baseline, and does not require PT/OT eval and treat    Dispo planning - return home     Other specified hypothyroidism  Assessment & Plan  · Continue levothyroxine           VTE Pharmacologic Prophylaxis:   VTE Score: 5 High Risk (Score >/= 5) - Pharmacological DVT Prophylaxis Ordered: Enoxaparin (Lovenox)  Sequential Compression Devices Ordered  Mechanical VTE Prophylaxis in Place: No    Patient Centered Rounds: I have performed bedside rounds with nursing staff today  Discussions with Specialists or Other Care Team Provider: Pharmacy, Nursing Team, Infectious Disease    Education and Discussions with Family / Patient: Patient declined call to   Current Length of Stay: 4 day(s)    Current Patient Status: Inpatient     Discharge Plan / Estimated Discharge Date: [facility]IM is following this patient on consult  They are not yet medically stable for discharge  Code Status: Level 1 - Full Code      Subjective:   Nursing team reported no overnight events  Patient reports feeling good this morning  Patient had symptoms, however, did complain of not being comfortable in bed    Patient denied chills, fevers, diaphoresis, chest pain, shortness for breath, abdominal pain, and pain at bilateral cellulitis  Objective:   Patient's cellulitis patches appear to continue to recede from demarcated line  Vitals:   Temp (24hrs), Av 1 °F (36 2 °C), Min:96 7 °F (35 9 °C), Max:97 4 °F (36 3 °C)    Temp:  [96 7 °F (35 9 °C)-97 4 °F (36 3 °C)] 97 4 °F (36 3 °C)  BP: (106-109)/(66-67) 109/66  SpO2:  [95 %-98 %] 98 %  Body mass index is 88 24 kg/m²  Input and Output Summary (last 24 hours): Intake/Output Summary (Last 24 hours) at 5/10/2022 1540  Last data filed at 5/10/2022 1300  Gross per 24 hour   Intake 1440 ml   Output 800 ml   Net 640 ml       Physical Exam:     Physical Exam  Vitals and nursing note reviewed  Constitutional:       Appearance: She is obese  HENT:      Head: Normocephalic and atraumatic  Right Ear: External ear normal       Left Ear: External ear normal       Mouth/Throat:      Mouth: Mucous membranes are moist       Pharynx: Oropharynx is clear  Eyes:      General: No scleral icterus  Conjunctiva/sclera: Conjunctivae normal    Cardiovascular:      Rate and Rhythm: Normal rate and regular rhythm  Pulses: Normal pulses  Radial pulses are 2+ on the right side and 2+ on the left side  Dorsalis pedis pulses are 2+ on the right side and 2+ on the left side  Heart sounds: Normal heart sounds  Pulmonary:      Effort: Pulmonary effort is normal  No respiratory distress  Breath sounds: Normal breath sounds  Abdominal:      General: Abdomen is protuberant  Bowel sounds are normal  There is no distension  Palpations: Abdomen is soft  Tenderness: There is no abdominal tenderness  Musculoskeletal:      Right lower leg: No edema  Left lower leg: No edema  Skin:     General: Skin is warm and dry  Capillary Refill: Capillary refill takes less than 2 seconds  Coloration: Skin is not jaundiced        Comments: Large area of cellulitis on left hip  Smaller cellulitis on right abdominal fold  Both residing from demarcated line   Neurological:      Mental Status: She is alert and oriented to person, place, and time  Mental status is at baseline  Psychiatric:         Mood and Affect: Mood normal          Behavior: Behavior normal           Additional Data:     Labs:  Results from last 7 days   Lab Units 05/10/22  0526 05/09/22  0505 05/08/22  0543 05/07/22  1015 05/06/22  1216   WBC Thousand/uL 7 82   < > 11 26*   < > 19 38*   HEMOGLOBIN g/dL 10 8*   < > 9 6*   < > 11 4*   HEMATOCRIT % 37 5   < > 31 4*   < > 39 6   PLATELETS Thousands/uL 411*   < > 351   < > 402*   BANDS PCT %  --   --   --   --  1   NEUTROS PCT %  --   --  84*   < >  --    LYMPHS PCT %  --   --  8*   < >  --    LYMPHO PCT %  --   --   --   --  2*   MONOS PCT %  --   --  5   < >  --    MONO PCT %  --   --   --   --  4   EOS PCT %  --   --  2   < > 0    < > = values in this interval not displayed  Results from last 7 days   Lab Units 05/10/22  0526 05/09/22  0505 05/08/22  0543   SODIUM mmol/L 135   < > 133*   POTASSIUM mmol/L 3 8   < > 3 7   CHLORIDE mmol/L 100   < > 100   CO2 mmol/L 29   < > 28   BUN mg/dL 27*   < > 26*   CREATININE mg/dL 1 06   < > 1 14   ANION GAP mmol/L 6   < > 5   CALCIUM mg/dL 8 3*   < > 8 0*   ALBUMIN g/dL  --   --  2 4*   TOTAL BILIRUBIN mg/dL  --   --  0 70   ALK PHOS U/L  --   --  88   ALT U/L  --   --  11   AST U/L  --   --  16   GLUCOSE RANDOM mg/dL 112   < > 106    < > = values in this interval not displayed  Results from last 7 days   Lab Units 05/10/22  0526   INR  1 81*             Results from last 7 days   Lab Units 05/09/22  1359 05/07/22  1015 05/06/22  1401   LACTIC ACID mmol/L 0 8  --  1 0   PROCALCITONIN ng/ml  --  0 78*  --        Imaging: Reviewed radiology reports from this admission including: abdominal/pelvic CT scan    Recent Cultures (last 7 days):     Results from last 7 days   Lab Units 05/09/22  1816 05/08/22  1134 05/08/22  1133 05/06/22  1401   BLOOD CULTURE   --  No Growth at 24 hrs   No Growth at 24 hrs   Staphylococcus epidermidis*  Staphylococcus epidermidis*   GRAM STAIN RESULT   --   --   --  Gram positive cocci in clusters*  Gram positive cocci in clusters*   C DIFF TOXIN B BY PCR  Negative  --   --   --        Lines/Drains:  Invasive Devices  Report    Peripheral Intravenous Line            Peripheral IV 05/09/22 Right;Ventral (anterior) Forearm 1 day          Drain            External Urinary Catheter 1 day                Telemetry:        Last 24 Hours Medication List:   Current Facility-Administered Medications   Medication Dose Route Frequency Provider Last Rate    acetaminophen  650 mg Oral Q6H PRN Kacey Salinas PA-C      allopurinol  100 mg Oral Daily Mitchell Guerra PA-C      DULoxetine  20 mg Oral Daily Mitchell Guerra PA-C      furosemide  40 mg Oral Daily Mitchell Guerra PA-C      levothyroxine  125 mcg Oral Early Morning Mitchell Guerra PA-C      melatonin  3 mg Oral HS PRN Ashley Goodwin MD      triamcinolone   Topical BID Rom Juárez DO      [START ON 5/11/2022] vancomycin  15 mg/kg (Adjusted) Intravenous Daily PRN Lenis Pallas, MD      vancomycin  125 mg Oral Q12H [address] Lenis Pallas, MD      [START ON 5/11/2022] warfarin  2 mg Oral Once per day on Sun Mon Wed Fri Rom Juárez DO      warfarin  4 mg Oral Once per day on Tue Thu Sat Kacey Salinas PA-C          Note generated by Resident and Medical Student Ashley Goodwin MD MSIII

## 2022-05-10 NOTE — PROGRESS NOTES
Vancomycin Assessment    Zena Kennedy is a 80 y o  female who is currently receiving vancomycin 1500 mg every 12 hours for skin-soft tissue infection     Relevant clinical data and objective history reviewed:  Creatinine   Date Value Ref Range Status   05/10/2022 1 06 0 60 - 1 30 mg/dL Final     Comment:     Standardized to IDMS reference method   05/09/2022 1 08 0 60 - 1 30 mg/dL Final     Comment:     Standardized to IDMS reference method   05/08/2022 1 14 0 60 - 1 30 mg/dL Final     Comment:     Standardized to IDMS reference method   05/19/2018 1 03 0 6 - 1 2 MG/DL Final     Comment:     IDMS method performed  The drugs Metamizole, Sulfasalazine, and Sulfapyridine may interfere and  result in false low results  /67   Pulse 64   Temp (!) 96 7 °F (35 9 °C)   Resp 17   Ht 5' 1" (1 549 m)   Wt (!) 212 kg (467 lb)   LMP  (LMP Unknown)   SpO2 95%   BMI 88 24 kg/m²   I/O last 3 completed shifts: In: 2980 [P O :2400; IV Piggyback:580]  Out: 1700 [Urine:1700]  Lab Results   Component Value Date/Time    BUN 27 (H) 05/10/2022 05:26 AM    BUN 33 (H) 05/19/2018 06:15 PM    WBC 7 82 05/10/2022 05:26 AM    WBC 9 1 05/19/2018 06:15 PM    HGB 10 8 (L) 05/10/2022 05:26 AM    HGB 12 3 05/19/2018 06:15 PM    HCT 37 5 05/10/2022 05:26 AM    HCT 38 5 05/19/2018 06:15 PM    MCV 85 05/10/2022 05:26 AM    MCV 93 3 05/19/2018 06:15 PM     (H) 05/10/2022 05:26 AM     05/19/2018 06:15 PM     Temp Readings from Last 3 Encounters:   05/10/22 (!) 96 7 °F (35 9 °C)   05/06/22 (!) 97 3 °F (36 3 °C)   04/20/22 98 °F (36 7 °C)     Vancomycin Days of Therapy: 4    Assessment/Plan  The patient is currently on vancomycin utilizing scheduled dosing  Baseline risks associated with therapy include: advanced age    The patient is receiving 1500 mg every 12 hours with the most recent vancomycin level being at steady-state and supratherapeutic based on a goal of 15-20 (appropriate for most indications) ; therefore, after clinical evaluation will be changed to 1750 mg daily as needed random vancomycin level < 20   Pharmacy will continue to follow closely for s/sx of nephrotoxicity, infusion reactions, and appropriateness of therapy  BMP and CBC will be ordered per protocol  Plan for random vancomycin level at approximately 1400 on 5/11  Pharmacy will continue to follow the patients culture results and clinical progress daily      Danielito Fu, Pharmacist

## 2022-05-10 NOTE — ASSESSMENT & PLAN NOTE
· Noted history of chronic atrial fibrillation    · Anticoagulated on warfarin    Recent Labs     05/10/22  0526 05/11/22  0615 05/12/22  0548   INR 1 81* 1 88* 2 23*     Received one time increased dose of 4 mg on 5/9 and then resumed home regimen    · Rates controlled  · Daily INR   · Continue warfarin mae  · Goal INR 2-3

## 2022-05-10 NOTE — PROGRESS NOTES
Progress Note - Infectious Disease   Brandon Dalton 80 y o  female MRN: [id number]  Unit/Bed#: W -01 Encounter: [id number]      Impression/Recommendations:  1  Panniculitis/lower abdominal cellulitis  Cellulitis has the appearance of streptococcal infection  Patient continues slow clinical improvement  Slow improvement is likely in this morbidly obese patient with panniculitis, due to low blood flow to adipose tissue  With presence of Staphylococcus epidermidis bacteremia (see below), will treat patient with vancomycin  Continue IV vancomycin for now  Serial exams  Monitor temperature/WBC  Anticipate transition to p o  Doxycycline/Keflex , if patient continues to improve clinically  Treat x at least 14 days total antibiotic      2  MRSE bacteremia  Although there was growth in both admission blood cultures, I suspect these are contaminated blood draw  Patient has very difficult blood drawn IV access  She has no indwelling intravascular foreign body  However, IV vancomycin for panniculitis above should provide coverage  IV vancomycin, as in above  Follow-up on repeat blood cultures      3  Recently treated C difficile colitis  No evidence of active colitis  Will keep patient on p o  Vancomycin prophylaxis  Continue p o  Vancomycin prophylaxis while on systemic antibiotic      4  Morbid obesity  It would be difficult to obtain adequate antibiotic level via oral route in this patient  She will need a somewhat prolonged IV antibiotic course      Discussed with patient in detail regarding the above plan  Discussed with slim service earlier      Antibiotics:  Vancomycin # 4      Subjective:  Abdominal wall pain continues to improve  Temperature stays down  No chills  She is tolerating antibiotic well    No nausea, vomiting or diarrhea      Objective:  Vitals:  Temp:  [96 7 °F (35 9 °C)] 96 7 °F (35 9 °C)  BP: (106-108)/(66-67) 108/67  SpO2:  [95 %] 95 %  Temp (24hrs), Av 7 °F (35 9 °C), Min:96 7 °F (35 9 °C), Max:96 7 °F (35 9 °C)  Current: Temperature: (!) 96 7 °F (35 9 °C)    Physical Exam:     General: Awake, alert, cooperative, no distress  Neck:  Supple  No mass  No lymphadenopathy  Lungs: Expansion symmetric, no rales, no wheezing, respirations unlabored  Heart:  Regular rate and rhythm, S1 and S2 normal, no murmur  Abdomen: Soft, obese  Lower abdominal induration and erythema/warmth improving  No purulence  Improving tenderness  Extremities: No edema  No erythema/warmth  No ulcer  Nontender to palpation  Skin:  No rash  Neuro: Moves all extremities  Invasive Devices  Report    Peripheral Intravenous Line            Peripheral IV 05/09/22 Right;Ventral (anterior) Forearm <1 day          Drain            External Urinary Catheter 1 day                Labs studies:   I have personally reviewed pertinent labs  Results from last 7 days   Lab Units 05/10/22  0526 05/09/22  0505 05/08/22  0543 05/07/22  1015 05/06/22  1216   POTASSIUM mmol/L 3 8 4 5 3 7   < > 4 0   CHLORIDE mmol/L 100 100 100   < > 96   CO2 mmol/L 29 29 28   < > 30   BUN mg/dL 27* 24 26*   < > 20   CREATININE mg/dL 1 06 1 08 1 14   < > 1 15   EGFR ml/min/1 73sq m 53 52 48   < > 48   CALCIUM mg/dL 8 3* 8 2* 8 0*   < > 8 6   AST U/L  --   --  16  --  20   ALT U/L  --   --  11  --  12   ALK PHOS U/L  --   --  88  --  115*    < > = values in this interval not displayed  Results from last 7 days   Lab Units 05/10/22  0526 05/09/22  0505 05/08/22  0543   WBC Thousand/uL 7 82 10 42* 11 26*   HEMOGLOBIN g/dL 10 8* 9 8* 9 6*   PLATELETS Thousands/uL 411* 352 351     Results from last 7 days   Lab Units 05/08/22  1134 05/08/22  1133 05/06/22  1401   BLOOD CULTURE  No Growth at 24 hrs  No Growth at 24 hrs   Staphylococcus epidermidis*  Staphylococcus epidermidis*   GRAM STAIN RESULT   --   --  Gram positive cocci in clusters*  Gram positive cocci in clusters*       Imaging Studies:   I have personally reviewed pertinent imaging study reports and images in PACS  EKG, Pathology, and Other Studies:   I have personally reviewed pertinent reports

## 2022-05-10 NOTE — ASSESSMENT & PLAN NOTE
· Cellulitis of pannus on right side of abdomen, and left hip cellulitis  · Left Hip cellulitis may be in the setting of a prior acetabulum fracture;  Improving  · She has had history of MRSA in the past as well on RLE  · Cellulitis has shrunk significantly on both sides as of 5/12/22  · Leukocytosis resolved   · MRSA negative    · Repeat blood cultures results -ve at 72 hrs   · Initial Blood Clx 2/2 Positive for Staph epidermidis, may be a true infection  · ID on board, appreciate the following rec  · Continue IV vanc as it will cover all of the suspected pathogens  · Vancomycin level recheck today 5/12/22  · Pharmacy consult in place  · Due to body size, IV course of antibiotics may need to be longer than usual - 14 days, through 5/20   · Plan to transition to PO abx 5/13 Doxycycline/Keflex  · Monitor WBC, vitals, demarcation of cellulitis  · Cellulitis has shrunken away from demarcation lines, although the right panniculi is still draining excessive amounts of fluid  · Fluid has a watery consistency, and is green/yellow in color

## 2022-05-11 LAB
ANION GAP SERPL CALCULATED.3IONS-SCNC: 4 MMOL/L (ref 4–13)
BUN SERPL-MCNC: 27 MG/DL (ref 5–25)
CALCIUM SERPL-MCNC: 8.5 MG/DL (ref 8.4–10.2)
CHLORIDE SERPL-SCNC: 102 MMOL/L (ref 96–108)
CO2 SERPL-SCNC: 29 MMOL/L (ref 21–32)
CREAT SERPL-MCNC: 0.92 MG/DL (ref 0.6–1.3)
ERYTHROCYTE [DISTWIDTH] IN BLOOD BY AUTOMATED COUNT: 17 % (ref 11.6–15.1)
GFR SERPL CREATININE-BSD FRML MDRD: 63 ML/MIN/1.73SQ M
GLUCOSE SERPL-MCNC: 105 MG/DL (ref 65–140)
HCT VFR BLD AUTO: 35.2 % (ref 34.8–46.1)
HGB BLD-MCNC: 10.1 G/DL (ref 11.5–15.4)
INR PPP: 1.88 (ref 0.84–1.19)
MCH RBC QN AUTO: 24.4 PG (ref 26.8–34.3)
MCHC RBC AUTO-ENTMCNC: 28.7 G/DL (ref 31.4–37.4)
MCV RBC AUTO: 85 FL (ref 82–98)
MRSA NOSE QL CULT: NORMAL
PLATELET # BLD AUTO: 421 THOUSANDS/UL (ref 149–390)
PMV BLD AUTO: 9.7 FL (ref 8.9–12.7)
POTASSIUM SERPL-SCNC: 4.3 MMOL/L (ref 3.5–5.3)
PROTHROMBIN TIME: 21.4 SECONDS (ref 11.6–14.5)
RBC # BLD AUTO: 4.14 MILLION/UL (ref 3.81–5.12)
SODIUM SERPL-SCNC: 135 MMOL/L (ref 135–147)
VANCOMYCIN SERPL-MCNC: 27.5 UG/ML (ref 10–20)
WBC # BLD AUTO: 8.12 THOUSAND/UL (ref 4.31–10.16)

## 2022-05-11 PROCEDURE — 85027 COMPLETE CBC AUTOMATED: CPT

## 2022-05-11 PROCEDURE — 99232 SBSQ HOSP IP/OBS MODERATE 35: CPT | Performed by: INTERNAL MEDICINE

## 2022-05-11 PROCEDURE — 85610 PROTHROMBIN TIME: CPT

## 2022-05-11 PROCEDURE — 80202 ASSAY OF VANCOMYCIN: CPT | Performed by: INTERNAL MEDICINE

## 2022-05-11 PROCEDURE — 80048 BASIC METABOLIC PNL TOTAL CA: CPT

## 2022-05-11 RX ORDER — WARFARIN SODIUM 5 MG/1
5 TABLET ORAL
Status: COMPLETED | OUTPATIENT
Start: 2022-05-11 | End: 2022-05-11

## 2022-05-11 RX ADMIN — TRIAMCINOLONE ACETONIDE: 1 CREAM TOPICAL at 09:10

## 2022-05-11 RX ADMIN — LEVOTHYROXINE SODIUM 125 MCG: 125 TABLET ORAL at 05:46

## 2022-05-11 RX ADMIN — ALLOPURINOL 100 MG: 100 TABLET ORAL at 09:10

## 2022-05-11 RX ADMIN — WARFARIN SODIUM 2 MG: 2 TABLET ORAL at 17:21

## 2022-05-11 RX ADMIN — DULOXETINE HYDROCHLORIDE 20 MG: 20 CAPSULE, DELAYED RELEASE ORAL at 09:10

## 2022-05-11 RX ADMIN — TRIAMCINOLONE ACETONIDE 1 APPLICATION: 1 CREAM TOPICAL at 17:20

## 2022-05-11 RX ADMIN — WARFARIN SODIUM 5 MG: 5 TABLET ORAL at 17:20

## 2022-05-11 RX ADMIN — Medication 125 MG: at 22:08

## 2022-05-11 RX ADMIN — Medication 125 MG: at 09:10

## 2022-05-11 NOTE — PROGRESS NOTES
Vancomycin IV Pharmacy-to-Dose Consultation    Edmund Horn is a 80 y o  female who is currently receiving Vancomycin IV with management by the Pharmacy Consult service  Assessment/Plan:  The patient was reviewed  Random level today resulted as 27,so will hold vancomycin dose for today with a plan for random level to be drawn at 1400 on 05/12, vancomycin will be re-dosed when level <20    We will continue to follow the patients culture results and clinical progress daily      Scott Vergara, Pharmacist

## 2022-05-11 NOTE — CASE MANAGEMENT
Case Management Progress Note    Patient name Rhona García W /W -19 MRN [id number]  : 1950 Date 2022       LOS (days): 5  Geometric Mean LOS (GMLOS) (days): 3 20  Days to GMLOS:-1 3        OBJECTIVE:        Current admission status: Inpatient  Preferred Pharmacy:    [address] [address]  Phone: [phone] Fax: [phone]    [facility] [place] [address]  Phone: [phone] Fax: [phone]    Primary Care Provider: ORACIO Randolph    Primary Insurance: MEDICARE  Secondary Insurance: A[employer]    PROGRESS NOTE:    Weekly Care Management Length of Stay Review     Current LOS: 5    Most Recent Labs:     Lab Results   Component Value Date/Time    WBC 7 82 05/10/2022 05:26 AM    HGB 10 8 (L) 05/10/2022 05:26 AM    HCT 37 5 05/10/2022 05:26 AM     (H) 05/10/2022 05:26 AM    SODIUM 135 2022 06:15 AM    K 4 3 2022 06:15 AM     2022 06:15 AM    CO2 29 2022 06:15 AM    BUN 27 (H) 2022 06:15 AM    CREATININE 0 92 2022 06:15 AM    GLUC 105 2022 06:15 AM    INR 1 88 (H) 2022 06:15 AM       Most Recent Vitals:   Vitals:    22 0909   BP: 101/64   Pulse:    Resp:    Temp:    SpO2: 94%        Brief Care Summary & Need for Continued Stay[de-identified] cellulitis, iv abx, change to PO on ,     Intended Discharge Disposition: Home with [facility]    Expected Discharge Date: 72Hrs    Current Identified Barriers to Discharge & Delays[de-identified]      D/C Goals/ CM Interventions[de-identified]

## 2022-05-11 NOTE — PLAN OF CARE
Problem: Potential for Falls  Goal: Patient will remain free of falls  Description: INTERVENTIONS:  - Educate patient/family on patient safety including physical limitations  - Instruct patient to call for assistance with activity   - Consult OT/PT to assist with strengthening/mobility   - Keep Call bell within reach  - Keep bed low and locked with side rails adjusted as appropriate  - Keep care items and personal belongings within reach  - Initiate and maintain comfort rounds  - Make Fall Risk Sign visible to staff  - Offer Toileting every  Hours, in advance of need  - Initiate/Maintain alarm  - Obtain necessary fall risk management equipment:   - Apply yellow socks and bracelet for high fall risk patients  - Consider moving patient to room near nurses station  Outcome: Progressing     Problem: MOBILITY - ADULT  Goal: Maintain or return to baseline ADL function  Description: INTERVENTIONS:  -  Assess patient's ability to carry out ADLs; assess patient's baseline for ADL function and identify physical deficits which impact ability to perform ADLs (bathing, care of mouth/teeth, toileting, grooming, dressing, etc )  - Assess/evaluate cause of self-care deficits   - Assess range of motion  - Assess patient's mobility; develop plan if impaired  - Assess patient's need for assistive devices and provide as appropriate  - Encourage maximum independence but intervene and supervise when necessary  - Involve family in performance of ADLs  - Assess for home care needs following discharge   - Consider OT consult to assist with ADL evaluation and planning for discharge  - Provide patient education as appropriate  Outcome: Progressing  Goal: Maintains/Returns to pre admission functional level  Description: INTERVENTIONS:  - Perform BMAT or MOVE assessment daily    - Set and communicate daily mobility goal to care team and patient/family/caregiver     - Collaborate with rehabilitation services on mobility goals if consulted  - Perform Range of Motion  times a day  - Reposition patient every  hours  - Dangle patient  times a day  - Stand patient  times a day  - Ambulate patient  times a day  - Out of bed to chair times a day   - Out of bed for meal times a day  - Out of bed for toileting  - Record patient progress and toleration of activity level   Outcome: Progressing     Problem: Prexisting or High Potential for Compromised Skin Integrity  Goal: Skin integrity is maintained or improved  Description: INTERVENTIONS:  - Identify patients at risk for skin breakdown  - Assess and monitor skin integrity  - Assess and monitor nutrition and hydration status  - Monitor labs   - Assess for incontinence   - Turn and reposition patient  - Assist with mobility/ambulation  - Relieve pressure over bony prominences  - Avoid friction and shearing  - Provide appropriate hygiene as needed including keeping skin clean and dry  - Evaluate need for skin moisturizer/barrier cream  - Collaborate with interdisciplinary team   - Patient/family teaching  - Consider wound care consult   Outcome: Progressing     Problem: Nutrition/Hydration-ADULT  Goal: Nutrient/Hydration intake appropriate for improving, restoring or maintaining nutritional needs  Description: Monitor and assess patient's nutrition/hydration status for malnutrition  Collaborate with interdisciplinary team and initiate plan and interventions as ordered  Monitor patient's weight and dietary intake as ordered or per policy  Utilize nutrition screening tool and intervene as necessary  Determine patient's food preferences and provide high-protein, high-caloric foods as appropriate       INTERVENTIONS:  - Monitor oral intake, urinary output, labs, and treatment plans  - Assess nutrition and hydration status and recommend course of action  - Evaluate amount of meals eaten  - Assist patient with eating if necessary   - Allow adequate time for meals  - Recommend/ encourage appropriate diets, oral nutritional supplements, and vitamin/mineral supplements  - Order, calculate, and assess calorie counts as needed  - Recommend, monitor, and adjust tube feedings and TPN/PPN based on assessed needs  - Assess need for intravenous fluids  - Provide specific nutrition/hydration education as appropriate  - Include patient/family/caregiver in decisions related to nutrition  Outcome: Progressing

## 2022-05-11 NOTE — PROGRESS NOTES
[facility]  Progress Note - [facility] 1950, 80 y o  female MRN: [id number]  Unit/Bed#: W -01 Encounter: [id number]  Primary Care Provider: ORACIO Bahena   Date and time admitted to hospital: 5/6/2022  5:48 PM    * Cellulitis  Assessment & Plan  · Cellulitis of pannus on right side of abdomen, and left hip cellulitis  · Left Hip cellulitis may be in the setting of a prior acetabulum fracture; Improving  · She has had history of MRSA in the past as well on RLE  · Cellulitis has shrunk significantly on both sides as of 5/10/22  · Leukocytosis resolved   · MRSA negative  · Repeat blood cultures results -ve at 24 hrs   · Initial Blood Clx 2/2 Positive for Staph epidermidis, may be a true infection  · Susceptible to Vancomycin, Clindamycin and Gentamycin  · ID on board, appreciate the following rec  · Continue IV vanc as it will cover all of the suspected pathogens  · Due to body size, IV course of antibiotics may need to be longer than usual - 14 days, through 5/20   · Plan to transition to PO abx 5/13 Doxycycline/Keflex  · Monitor WBC, vitals, demarcation of cellulitis    Chronic atrial fibrillation (HCC)  Assessment & Plan  · Noted history of chronic atrial fibrillation  · Anticoagulated on warfarin    Recent Labs     05/09/22  0505 05/10/22  0526 05/11/22  0615   INR 1 92* 1 81* 1 88*     Received one time increased dose of 4 mg on 5/9 and then resumed home regimen    · Rates controlled  · Daily INR   · Continue warfarin mae  · 1x Warfarin 5 mg for INR 1 88   · Goalm INR 2-3    C  difficile colitis  Assessment & Plan  · History of C diff infection back in January again noted last month  · C  Diff Panel negative     · On p o  Vancomycin - Will continue p o  Vancomycin while on IV antibiotics for cellulitis of the abdominal wall  · Were require prophylaxis with p o   Vancomycin up to 2 days after of completing treatment for cellulitis (through 05/22/2022)    Chronic diastolic congestive heart failure (HCC)  Assessment & Plan  Wt Readings from Last 3 Encounters:   05/08/22 (!) 212 kg (467 lb)   05/06/22 (!) 193 kg (425 lb)   04/04/22 (!) 181 kg (400 lb)     · Does not appear to be in acute exacerbation at this  · Continue home Lasix with hold parameters for SBP < 110      Extreme Obesity - BMI 70 and over, adult [facility])  Assessment & Plan  Body mass index is 88 24 kg/m²   Recommend incorporating a more whole foods plant-predominant diet along with decreasing consumption of red meats and processed foods   Per AHA guidelines, recommend moderate-vigorous intensity exercise for 30 minutes a day for 5 days a week or a total of 150 min/week   PT/OT eval - patient at baseline, and does not require PT/OT eval and treat    Dispo planning - return home with help    Other specified hypothyroidism  Assessment & Plan  · Continue levothyroxine           VTE Pharmacologic Prophylaxis:   VTE Score: 5 High Risk (Score >/= 5) - Pharmacological DVT Prophylaxis Ordered: Enoxaparin (Lovenox)  Sequential Compression Devices Ordered  Mechanical VTE Prophylaxis in Place: No    Patient Centered Rounds: I have performed bedside rounds with nursing staff today  Discussions with Specialists or Other Care Team Provider: Pharmacy, Nursing Team, Infectious Disease    Education and Discussions with Family / Patient: Patient declined call to   Current Length of Stay: 5 day(s)    Current Patient Status: Inpatient     Discharge Plan / Estimated Discharge Date: Anticipate discharge in 48 hrs to home with home services  Code Status: Level 1 - Full Code      Subjective:   Nursing team and Night team reported patient being uncomfortable in bariatric bed and was transitioned to hospital bed  Patient reports feeling good this morning and better due to bed change  Patient reported chills, chronic right hip pain   Patient denied fevers, diaphoresis, chest pain, shortness for breath, abdominal pain, and pain at bilateral cellulitis  Objective:   Patient's cellulitis patches appear to continue to recede from demarcated line  Vitals:   Temp (24hrs), Av 1 °F (36 2 °C), Min:96 6 °F (35 9 °C), Max:97 4 °F (36 3 °C)    Temp:  [96 6 °F (35 9 °C)-97 4 °F (36 3 °C)] 96 6 °F (35 9 °C)  HR:  [59-70] 70  Resp:  [18] 18  BP: (101-121)/(60-66) 101/64  SpO2:  [93 %-99 %] 94 %  Body mass index is 88 24 kg/m²  Input and Output Summary (last 24 hours): Intake/Output Summary (Last 24 hours) at 2022 1505  Last data filed at 2022 1318  Gross per 24 hour   Intake 600 ml   Output 900 ml   Net -300 ml       Physical Exam:     Physical Exam  Vitals and nursing note reviewed  Constitutional:       Appearance: She is obese  HENT:      Head: Normocephalic and atraumatic  Right Ear: External ear normal       Left Ear: External ear normal       Mouth/Throat:      Mouth: Mucous membranes are moist       Pharynx: Oropharynx is clear  Eyes:      General: No scleral icterus  Conjunctiva/sclera: Conjunctivae normal    Cardiovascular:      Rate and Rhythm: Normal rate and regular rhythm  Pulses: Normal pulses  Radial pulses are 2+ on the right side and 2+ on the left side  Dorsalis pedis pulses are 2+ on the right side and 2+ on the left side  Heart sounds: Normal heart sounds  Pulmonary:      Effort: Pulmonary effort is normal  No respiratory distress  Breath sounds: Normal breath sounds  Abdominal:      General: Abdomen is protuberant  Bowel sounds are normal  There is no distension  Palpations: Abdomen is soft  Tenderness: There is no abdominal tenderness  Musculoskeletal:      Right lower leg: No edema  Left lower leg: No edema  Skin:     General: Skin is warm and dry  Capillary Refill: Capillary refill takes less than 2 seconds  Coloration: Skin is not jaundiced        Comments: Large area of cellulitis on left hip  Smaller cellulitis on right abdominal fold, with clear yellow drainage   Both residing from demarcated line   Neurological:      Mental Status: She is alert and oriented to person, place, and time  Mental status is at baseline  Psychiatric:         Mood and Affect: Mood normal          Behavior: Behavior normal           Additional Data:     Labs:  Results from last 7 days   Lab Units 05/11/22  0615 05/09/22  0505 05/08/22  0543 05/07/22  1015 05/06/22  1216   WBC Thousand/uL 8 12   < > 11 26*   < > 19 38*   HEMOGLOBIN g/dL 10 1*   < > 9 6*   < > 11 4*   HEMATOCRIT % 35 2   < > 31 4*   < > 39 6   PLATELETS Thousands/uL 421*   < > 351   < > 402*   BANDS PCT %  --   --   --   --  1   NEUTROS PCT %  --   --  84*   < >  --    LYMPHS PCT %  --   --  8*   < >  --    LYMPHO PCT %  --   --   --   --  2*   MONOS PCT %  --   --  5   < >  --    MONO PCT %  --   --   --   --  4   EOS PCT %  --   --  2   < > 0    < > = values in this interval not displayed  Results from last 7 days   Lab Units 05/11/22  0615 05/09/22  0505 05/08/22  0543   SODIUM mmol/L 135   < > 133*   POTASSIUM mmol/L 4 3   < > 3 7   CHLORIDE mmol/L 102   < > 100   CO2 mmol/L 29   < > 28   BUN mg/dL 27*   < > 26*   CREATININE mg/dL 0 92   < > 1 14   ANION GAP mmol/L 4   < > 5   CALCIUM mg/dL 8 5   < > 8 0*   ALBUMIN g/dL  --   --  2 4*   TOTAL BILIRUBIN mg/dL  --   --  0 70   ALK PHOS U/L  --   --  88   ALT U/L  --   --  11   AST U/L  --   --  16   GLUCOSE RANDOM mg/dL 105   < > 106    < > = values in this interval not displayed       Results from last 7 days   Lab Units 05/11/22  0615   INR  1 88*             Results from last 7 days   Lab Units 05/09/22  1359 05/07/22  1015 05/06/22  1401   LACTIC ACID mmol/L 0 8  --  1 0   PROCALCITONIN ng/ml  --  0 78*  --        Imaging: Reviewed radiology reports from this admission including: abdominal/pelvic CT scan    Recent Cultures (last 7 days):     Results from last 7 days   Lab Units 05/09/22  1816 05/08/22  1134 05/08/22  1133 05/06/22  1401   BLOOD CULTURE   --  No Growth at 48 hrs  No Growth at 48 hrs   Staphylococcus epidermidis*  Staphylococcus epidermidis*   GRAM STAIN RESULT   --   --   --  Gram positive cocci in clusters*  Gram positive cocci in clusters*   C DIFF TOXIN B BY PCR  Negative  --   --   --        Lines/Drains:  Invasive Devices  Report    Peripheral Intravenous Line  Duration           Peripheral IV 05/09/22 Right;Ventral (anterior) Forearm 2 days          Drain  Duration           External Urinary Catheter 2 days                Telemetry:        Last 24 Hours Medication List:   Current Facility-Administered Medications   Medication Dose Route Frequency Provider Last Rate    acetaminophen  650 mg Oral Q6H PRN Chrissie Davis PA-C      allopurinol  100 mg Oral Daily Mitchell Guerra PA-C      DULoxetine  20 mg Oral Daily Mitchell Guerra PA-C      furosemide  40 mg Oral Daily [facility]      levothyroxine  125 mcg Oral Early Morning Mitchell Guerra PA-C      melatonin  3 mg Oral HS PRN Orlando Flores MD      triamcinolone   Topical BID Rom Juárez DO      vancomycin  15 mg/kg (Adjusted) Intravenous Daily PRN Wanda Stewart MD      vancomycin  125 mg Oral Q12H [facility] [place] Wanda Stewart MD      warfarin  2 mg Oral Once per day on Sun Mon Wed Fri Rom Juárez DO      warfarin  4 mg Oral Once per day on Tue Thu Sat Chrissie Davis PA-C      warfarin  5 mg Oral Once (warfarin) Orlando Flores MD          Note generated by Resident and Medical Student Orlando Flores MD [facility]

## 2022-05-11 NOTE — PROGRESS NOTES
Progress Note - Infectious Disease   Beatris Fitting 80 y o  female MRN: [id number]  Unit/Bed#: W -01 Encounter: [id number]      Impression/Recommendations:  1  Panniculitis/lower abdominal cellulitis   Cellulitis has the appearance of streptococcal infection   Patient continues slow clinical improvement   Slow improvement is likely in this morbidly obese patient with panniculitis, due to low blood flow to adipose tissue   With presence of Staphylococcus epidermidis bacteremia (see below), will treat patient with vancomycin  Continue IV vancomycin for now  Serial exams  Monitor temperature/WBC  Anticipate transition to p o  Doxycycline/Keflex 5/13, if patient continues to improve clinically  Treat x at least 14 days total antibiotic      2  MRSE bacteremia   Although there was growth in both admission blood cultures, I suspect these are contaminated blood draw   Patient has very difficult blood drawn IV access   She has no indwelling intravascular foreign body   However, IV vancomycin for panniculitis above should provide coverage  IV vancomycin, as in above  Follow-up on repeat blood cultures      3  Recently treated C difficile colitis   No evidence of active colitis   Will keep patient on p o  Vancomycin prophylaxis  Continue p o  Vancomycin prophylaxis while on systemic antibiotic      4  Morbid obesity   It would be difficult to obtain adequate antibiotic level via oral route in this patient  Reese Mcgarry will need a somewhat prolonged IV antibiotic course      Discussed with patient in detail regarding the above plan      Antibiotics:  Vancomycin # 5     Subjective:  Abdominal wall pain continues to improve  Temperature stays down   No chills    She is tolerating antibiotic well   No nausea, vomiting or diarrhea      Objective:  Vitals:  Temp:  [96 6 °F (35 9 °C)-97 4 °F (36 3 °C)] 96 6 °F (35 9 °C)  HR:  [59-70] 70  Resp:  [18] 18  BP: (101-121)/(60-66) 101/64  SpO2:  [93 %-99 %] 94 %  Temp (24hrs), Av 1 °F (36 2 °C), Min:96 6 °F (35 9 °C), Max:97 4 °F (36 3 °C)  Current: Temperature: (!) 96 6 °F (35 9 °C)    Physical Exam:     General: Awake, alert, cooperative, no distress  Neck:  Supple  No mass  No lymphadenopathy  Lungs: Expansion symmetric, no rales, no wheezing, respirations unlabored  Heart:  Regular rate and rhythm, S1 and S2 normal, no murmur  Abdomen: Soft, obese  Improved lower abdominal induration, erythema, warmth and tenderness  Stable serous drainage at groin skin fold  Extremities: No edema  No erythema/warmth  No ulcer  Nontender to palpation  Skin:  No rash  Neuro: Moves all extremities  Invasive Devices  Report    Peripheral Intravenous Line  Duration           Peripheral IV 22 Right;Ventral (anterior) Forearm 1 day          Drain  Duration           External Urinary Catheter 2 days                Labs studies:   I have personally reviewed pertinent labs  Results from last 7 days   Lab Units 22  0615 05/10/22  0526 22  0505 22  0543 22  1015 22  1216   POTASSIUM mmol/L 4 3 3 8 4 5 3 7   < > 4 0   CHLORIDE mmol/L 102 100 100 100   < > 96   CO2 mmol/L 29 29 29 28   < > 30   BUN mg/dL 27* 27* 24 26*   < > 20   CREATININE mg/dL 0 92 1 06 1 08 1 14   < > 1 15   EGFR ml/min/1 73sq m 63 53 52 48   < > 48   CALCIUM mg/dL 8 5 8 3* 8 2* 8 0*   < > 8 6   AST U/L  --   --   --  16  --  20   ALT U/L  --   --   --  11  --  12   ALK PHOS U/L  --   --   --  88  --  115*    < > = values in this interval not displayed  Results from last 7 days   Lab Units 05/10/22  0526 22  0505 22  0543   WBC Thousand/uL 7 82 10 42* 11 26*   HEMOGLOBIN g/dL 10 8* 9 8* 9 6*   PLATELETS Thousands/uL 411* 352 351     Results from last 7 days   Lab Units 22  1823 22  1816 22  1134 22  1133 22  1401   BLOOD CULTURE   --   --  No Growth at 48 hrs  No Growth at 48 hrs   Staphylococcus epidermidis*  Staphylococcus epidermidis*   GRAM STAIN RESULT   --   --   --   --  Gram positive cocci in clusters*  Gram positive cocci in clusters*   MRSA CULTURE ONLY  No Methicillin Resistant Staphlyococcus aureus (MRSA) isolated  --   --   --   --    C DIFF TOXIN B BY PCR   --  Negative  --   --   --        Imaging Studies:   I have personally reviewed pertinent imaging study reports and images in PACS  EKG, Pathology, and Other Studies:   I have personally reviewed pertinent reports

## 2022-05-11 NOTE — NURSING NOTE
Pt requested new bed  Stated she is "laying in the crater of someone's butt" and "I cannot move my legs"  She requested to be placed back into a regular hospital bed  Pt initially placed in mary jo bed due to her abdomen pressing against the side rails  MD notified and okayed the transition back to the regular hospital bed

## 2022-05-11 NOTE — CASE MANAGEMENT
Case Management Assessment & Discharge Planning Note    Patient name Ria Rushing  Location W /W -34 MRN [id number]  : 1950 Date 2022       Current Admission Date: 2022  Current Admission Diagnosis:Cellulitis   Patient Active Problem List    Diagnosis Date Noted    Chronic atrial fibrillation ([facility] ) 2022    C  difficile colitis 2022    Diarrhea of presumed infectious origin 2022    Generalized abdominal pain     Lymphedema of extremity 2021    Paroxysmal atrial fibrillation ([facility] ) 10/03/2020    Other specified hypothyroidism 10/03/2020    Mild episode of recurrent major depressive disorder ([facility] ) 10/03/2020    Idiopathic chronic 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 10/03/2020    Primary osteoarthritis involving multiple joints 10/03/2020    Extreme Obesity - BMI 70 and over, adult ([facility] ) 10/03/2020    Chronic diastolic congestive heart failure ([place] [address] ) 10/03/2020    Cellulitis 10/03/2020    Gastroesophageal reflux disease without esophagitis 10/03/2020    Hx MRSA infection 2020    Left ovarian cyst 2017    Depression with anxiety 07/15/2017    Bilateral lower extremity edema 2017    Chronic pain disorder 2017    Anemia 2016    Ambulatory dysfunction 2016    [facility] 2016    Adjustment disorder 2013    Renal insufficiency 2013    Irritable bowel syndrome 2012    Left atrial enlargement 2012    Left ventricular hypertrophy 2012    Carpal tunnel syndrome 2012    Gout 2012    Hyperlipidemia 2012    Osteoarthritis 2012    Symptomatic menopausal or female climacteric states 2012      LOS (days): 5  Geometric Mean LOS (GMLOS) (days): 3 20  Days to GMLOS:-1 3     OBJECTIVE:  PATIENT READMITTED TO HOSPITAL  Risk of Unplanned Readmission Score: 31 97         Current admission status: Inpatient Preferred Pharmacy:   [address] [facility], [address]  [address]  [place] [address]  Phone: [phone] Fax: [phone] [address] [facility], [address]  [address]  [facility] [address]  Phone: [phone] Fax: [phone]    Primary Care Provider: ORACIO Ross Cea    Primary Insurance: MEDICARE  Secondary Insurance: AARP    ASSESSMENT:  Active Health Care Proxies    There are no active Health Care Proxies on file  DISCHARGE DETAILS:    Discharge planning discussed with[de-identified] Pt  Freedom of Choice: Yes  Comments - Freedom of Choice: Pt asks to use [facility] ambulance services to go home  She is currently on the waiver program and has aides 12hrs/day 7 days a weeks    CM contacted family/caregiver?: No- see comments (Pt says her sister calls her enough and does not want her called )                                            Transport at Discharge : [facility] (Through [facility])

## 2022-05-12 LAB
ERYTHROCYTE [DISTWIDTH] IN BLOOD BY AUTOMATED COUNT: 17 % (ref 11.6–15.1)
HCT VFR BLD AUTO: 34.2 % (ref 34.8–46.1)
HGB BLD-MCNC: 9.9 G/DL (ref 11.5–15.4)
INR PPP: 2.23 (ref 0.84–1.19)
MCH RBC QN AUTO: 24.1 PG (ref 26.8–34.3)
MCHC RBC AUTO-ENTMCNC: 28.9 G/DL (ref 31.4–37.4)
MCV RBC AUTO: 83 FL (ref 82–98)
PLATELET # BLD AUTO: 456 THOUSANDS/UL (ref 149–390)
PMV BLD AUTO: 8.8 FL (ref 8.9–12.7)
PROTHROMBIN TIME: 24.3 SECONDS (ref 11.6–14.5)
RBC # BLD AUTO: 4.1 MILLION/UL (ref 3.81–5.12)
VANCOMYCIN SERPL-MCNC: 20.5 UG/ML (ref 10–20)
WBC # BLD AUTO: 8.94 THOUSAND/UL (ref 4.31–10.16)

## 2022-05-12 PROCEDURE — 80202 ASSAY OF VANCOMYCIN: CPT | Performed by: INTERNAL MEDICINE

## 2022-05-12 PROCEDURE — 99232 SBSQ HOSP IP/OBS MODERATE 35: CPT | Performed by: INTERNAL MEDICINE

## 2022-05-12 PROCEDURE — 85610 PROTHROMBIN TIME: CPT

## 2022-05-12 PROCEDURE — 85027 COMPLETE CBC AUTOMATED: CPT

## 2022-05-12 RX ADMIN — WARFARIN SODIUM 4 MG: 4 TABLET ORAL at 18:26

## 2022-05-12 RX ADMIN — TRIAMCINOLONE ACETONIDE 1 APPLICATION: 1 CREAM TOPICAL at 18:26

## 2022-05-12 RX ADMIN — Medication 125 MG: at 21:16

## 2022-05-12 RX ADMIN — LEVOTHYROXINE SODIUM 125 MCG: 125 TABLET ORAL at 05:05

## 2022-05-12 RX ADMIN — DULOXETINE HYDROCHLORIDE 20 MG: 20 CAPSULE, DELAYED RELEASE ORAL at 08:41

## 2022-05-12 RX ADMIN — TRIAMCINOLONE ACETONIDE: 1 CREAM TOPICAL at 10:03

## 2022-05-12 RX ADMIN — Medication 125 MG: at 10:02

## 2022-05-12 RX ADMIN — FUROSEMIDE 40 MG: 40 TABLET ORAL at 08:41

## 2022-05-12 RX ADMIN — ALLOPURINOL 100 MG: 100 TABLET ORAL at 08:41

## 2022-05-12 NOTE — PROGRESS NOTES
Progress Note - Infectious Disease   Ria Rushing 80 y o  female MRN: [id number]  Unit/Bed#: W -01 Encounter: [id number]      Impression/Recommendations:  1  Panniculitis/lower abdominal cellulitis   Cellulitis has the appearance of streptococcal infection   Patient continues slow clinical improvement   Slow improvement is likely in this morbidly obese patient with panniculitis, due to low blood flow to adipose tissue   With presence of Staphylococcus epidermidis bacteremia (see below), will treat patient with vancomycin  Continue IV vancomycin for now  Serial exams  Monitor temperature/WBC  Anticipate transition to p o  Doxycycline/Keflex in a m , if patient continues to improve clinically  Treat x at least 14 days total antibiotic      2  MRSE bacteremia   Although there was growth in both admission blood cultures, I suspect these are contaminated blood draw   Patient has very difficult blood drawn IV access   She has no indwelling intravascular foreign body  Repeat blood cultures have no growth thus far  Regardless, 7 day IV vancomycin course should be adequate treatment for true MRSE bacteremia  IV vancomycin, as in above  Follow-up on repeat blood cultures      3  Recently treated C difficile colitis   No evidence of active colitis   Will keep patient on p o  Vancomycin prophylaxis  Continue p o  Vancomycin prophylaxis while on systemic antibiotic      4  Morbid obesity   It would be difficult to obtain adequate antibiotic level via oral route in this patient  Melanie Headley will need a somewhat prolonged IV antibiotic course      Discussed with patient in detail regarding the above plan  Discussed with primary service earlier      Antibiotics:  Vancomycin # 6     Subjective:  Abdominal wall pain continues to improve, minimal now  Temperature stays down   No chills    She is tolerating antibiotic well   No nausea, vomiting or diarrhea      Objective:  Vitals:  Temp:  [97 3 °F (36 3 °C)-97 8 °F (36 6 °C)] 97 4 °F (36 3 °C)  HR:  [60-68] 68  Resp:  [18] 18  BP: ()/(55-64) 112/58  SpO2:  [91 %-99 %] 95 %  Temp (24hrs), Av 6 °F (36 4 °C), Min:97 3 °F (36 3 °C), Max:97 8 °F (36 6 °C)  Current: Temperature: (!) 97 4 °F (36 3 °C)    Physical Exam:     General: Awake, alert, cooperative, no distress  Neck:  Supple  No mass  No lymphadenopathy  Lungs: Expansion symmetric, no rales, no wheezing, respirations unlabored  Heart:  Regular rate and rhythm, S1 and S2 normal, no murmur  Abdomen: Soft, obese  Lower abdominal induration improved  Erythema/warmth improved  Tenderness improved  Improving serous drainage from groin  No purulence  Extremities: No edema  No erythema/warmth  No ulcer  Nontender to palpation  Skin:  No rash  Neuro: Moves all extremities  Invasive Devices  Report    Peripheral Intravenous Line  Duration           Peripheral IV 22 Right;Ventral (anterior) Forearm 2 days          Drain  Duration           External Urinary Catheter 3 days                Labs studies:   I have personally reviewed pertinent labs  Results from last 7 days   Lab Units 22  0615 05/10/22  0526 22  0505 22  0543 22  1015 22  1216   POTASSIUM mmol/L 4 3 3 8 4 5 3 7   < > 4 0   CHLORIDE mmol/L 102 100 100 100   < > 96   CO2 mmol/L 29 29 29 28   < > 30   BUN mg/dL 27* 27* 24 26*   < > 20   CREATININE mg/dL 0 92 1 06 1 08 1 14   < > 1 15   EGFR ml/min/1 73sq m 63 53 52 48   < > 48   CALCIUM mg/dL 8 5 8 3* 8 2* 8 0*   < > 8 6   AST U/L  --   --   --  16  --  20   ALT U/L  --   --   --  11  --  12   ALK PHOS U/L  --   --   --  88  --  115*    < > = values in this interval not displayed       Results from last 7 days   Lab Units 22  0548 22  0615 05/10/22  0526   WBC Thousand/uL 8 94 8 12 7 82   HEMOGLOBIN g/dL 9 9* 10 1* 10 8*   PLATELETS Thousands/uL 456* 421* 411*     Results from last 7 days   Lab Units 22  1823 22  1816 22  1134 05/08/22  1133 05/06/22  1401   BLOOD CULTURE   --   --  No Growth at 72 hrs  No Growth at 72 hrs  Staphylococcus epidermidis*  Staphylococcus epidermidis*   GRAM STAIN RESULT   --   --   --   --  Gram positive cocci in clusters*  Gram positive cocci in clusters*   MRSA CULTURE ONLY  No Methicillin Resistant Staphlyococcus aureus (MRSA) isolated  --   --   --   --    C DIFF TOXIN B BY PCR   --  Negative  --   --   --        Imaging Studies:   I have personally reviewed pertinent imaging study reports and images in PACS  EKG, Pathology, and Other Studies:   I have personally reviewed pertinent reports

## 2022-05-12 NOTE — CASE MANAGEMENT
Case Management Assessment & Discharge Planning Note    Patient name Adriane Reddy  Location W /W -17 MRN [id number]  : 1950 Date 2022       Current Admission Date: 2022  Current Admission Diagnosis:Cellulitis   Patient Active Problem List    Diagnosis Date Noted    Chronic atrial fibrillation ([facility] ) 2022    C  difficile colitis 2022    Diarrhea of presumed infectious origin 2022    Generalized abdominal pain     Lymphedema of extremity 2021    Paroxysmal atrial fibrillation ([facility] ) 10/03/2020    Other specified hypothyroidism 10/03/2020    Mild episode of recurrent major depressive disorder ([facility] ) 10/03/2020    Idiopathic chronic 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 10/03/2020    Primary osteoarthritis involving multiple joints 10/03/2020    Extreme Obesity - BMI 70 and over, adult ([facility] ) 10/03/2020    Chronic diastolic congestive heart failure ([place] [address] ) 10/03/2020    Cellulitis 10/03/2020    Gastroesophageal reflux disease without esophagitis 10/03/2020    Hx MRSA infection 2020    Left ovarian cyst 2017    Depression with anxiety 07/15/2017    Bilateral lower extremity edema 2017    Chronic pain disorder 2017    Anemia 2016    Ambulatory dysfunction 2016    Stage 3a chronic kidney disease ([facility] ) 2016    Adjustment disorder 2013    Renal insufficiency 2013    Irritable bowel syndrome 2012    Left atrial enlargement 2012    Left ventricular hypertrophy 2012    Carpal tunnel syndrome 2012    Gout 2012    Hyperlipidemia 2012    Osteoarthritis 2012    Symptomatic menopausal or female climacteric states 2012      LOS (days): 6  Geometric Mean LOS (GMLOS) (days): 3 20  Days to GMLOS:-2 5     OBJECTIVE:  PATIENT READMITTED TO HOSPITAL  Risk of Unplanned Readmission Score: 32 42         Current admission status: Inpatient Preferred Pharmacy:   [facility], [address]  [address]  [place] [address]  Phone: [phone] Fax: [phone] [facility], [address]  [address]  [facility] [address]  Phone: [phone] Fax: [phone]    Primary Care Provider: ORACIO Harper    Primary Insurance: MEDICARE  Secondary Insurance: AARP    ASSESSMENT:  Active Health Care Proxies    There are no active Health Care Proxies on file  DISCHARGE DETAILS:                                                          Transport at Discharge : [facility] Ambulance     Number/Name of Dispatcher: Daryl Lowe by Elida and Unit #): RONALDOHIGHAGUSTINA  ETA of Transport (Date): 05/13/22  ETA of Transport (Time): 1500                                        CM updated MD, RN, and Pt of transport time

## 2022-05-12 NOTE — QUICK NOTE
Contact head patient's caregiver Fay Archuleta to confirm that patient will have help at home  Caregiver is up-to-date on patient's recent care in hospital and is aware of patient's arrival tomorrow  Caregiver informed of ETA of transport at 3:00 p m  Ruthy Sibley Patient confirmed understanding and all questions were answered

## 2022-05-12 NOTE — PROGRESS NOTES
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 as well on RLE  · Cellulitis has shrunk significantly on both sides as of 5/12/22  · Leukocytosis resolved   · MRSA negative    · Repeat blood cultures results -ve at 72 hrs   · Initial Blood Clx 2/2 Positive for Staph epidermidis, may be a true infection  · ID on board, appreciate the following rec  · Continue IV vanc as it will cover all of the suspected pathogens  · Vancomycin level recheck today 5/12/22  · Pharmacy consult in place  · Due to body size, IV course of antibiotics may need to be longer than usual - 14 days, through 5/20   · Plan to transition to PO abx 5/13 Doxycycline/Keflex  · Monitor WBC, vitals, demarcation of cellulitis  · Cellulitis has shrunken away from demarcation lines, although the right panniculi is still draining excessive amounts of fluid  · Fluid has a watery consistency, and is green/yellow in color    Chronic atrial fibrillation (HCC)  Assessment & Plan  · Noted history of chronic atrial fibrillation  · Anticoagulated on warfarin    Recent Labs     05/10/22  0526 05/11/22  0615 05/12/22  0548   INR 1 81* 1 88* 2 23*     Received one time increased dose of 4 mg on 5/9 and then resumed home regimen    · Rates controlled  · Daily INR   · Continue warfarin mae  · Goal INR 2-3    C  difficile colitis  Assessment & Plan  · History of C diff infection back in January again noted last month  · C  Diff Panel negative     · On p o  Vancomycin - Will continue p o   Vancomycin while on IV antibiotics for cellulitis of the abdominal wall  · Were require prophylaxis with p o  Vancomycin up to 2 days after of completing treatment for cellulitis (through 05/22/2022)    Chronic diastolic congestive heart failure [facility])  Assessment & Plan  Wt Readings from Last 3 Encounters:   05/08/22 (!) 212 kg (467 lb)   05/06/22 (!) 193 kg (425 lb)   04/04/22 (!) 181 kg (400 lb)     · Does not appear to be in acute exacerbation at this  · Continue home Lasix with hold parameters for SBP < 110      Extreme Obesity - BMI 70 and over, adult [facility])  Assessment & Plan  Body mass index is 88 24 kg/m²   Recommend incorporating a more whole foods plant-predominant diet along with decreasing consumption of red meats and processed foods   Consider intermittent fasting/keto diet   Per AHA guidelines, recommend moderate-vigorous intensity exercise for 30 minutes a day for 5 days a week or a total of 150 min/week   PT/OT eval - patient at baseline, and does not require PT/OT eval and treat    Dispo planning - return home with help  Caregiver confirms Magdiel Keyows she is able to assess patient at home and is aware of patient's arrival     Other specified hypothyroidism  Assessment & Plan  · Continue levothyroxine           VTE Pharmacologic Prophylaxis:   VTE Score: 5 High Risk (Score >/= 5) - Pharmacological DVT Prophylaxis Ordered: Warfarin (Coumadin)  Sequential Compression Devices Ordered  Mechanical VTE Prophylaxis in Place: No    Patient Centered Rounds: I have performed bedside rounds with nursing staff today  Discussions with Specialists or Other Care Team Provider: ID, Nursing team, Pharmacy, PT/OT    Education and Discussions with Family / Patient: Updated  (Caregiver Magdiel Ochoa) via phone  Patient agreed call to to caregiver on the basis of confirming help and assistance at home      Current Length of Stay: 6 day(s)    Current Patient Status: Inpatient     Discharge Plan / Estimated Discharge Date: Anticipate discharge tomorrow to home with home services  Code Status: Level 1 - Full Code      Subjective:   Patient reports to be feeling "pretty good " She says that her larger panniculi is still draining quite a bit and makes it look like "there's pee on the floor " She is excited for her cellulitis to resolve as she has had both of them for quite some time  She is comfortable in her hospital bed  She still has some hip pain  Patient denied and fevers, sweating, chest pain/palpitations, shortness of breath, and pain at bilateral cellulitis  She reports 2 episodes of diarrhea last night and none today  She has no issues with urinating  Objective:   Patients cellulitis patches have further regressed every day so far  Vitals:   Temp (24hrs), Av 7 °F (36 5 °C), Min:97 4 °F (36 3 °C), Max:97 8 °F (36 6 °C)    Temp:  [97 4 °F (36 3 °C)-97 8 °F (36 6 °C)] 97 8 °F (36 6 °C)  HR:  [68] 68  Resp:  [18] 18  BP: (101-115)/(45-58) 101/45  SpO2:  [91 %-99 %] 92 %  Body mass index is 88 24 kg/m²  Input and Output Summary (last 24 hours): Intake/Output Summary (Last 24 hours) at 2022 1536  Last data filed at 2022 1345  Gross per 24 hour   Intake 610 ml   Output 1150 ml   Net -540 ml       Physical Exam:     Physical Exam  Constitutional:       General: She is not in acute distress  Appearance: She is obese  HENT:      Head: Normocephalic and atraumatic  Right Ear: External ear normal       Left Ear: External ear normal    Eyes:      Conjunctiva/sclera: Conjunctivae normal    Cardiovascular:      Rate and Rhythm: Normal rate  Rhythm irregular  Pulses: Normal pulses  Heart sounds: Normal heart sounds  No murmur heard  No friction rub  No gallop  Comments: Has A-fib,  Pulmonary:      Effort: Pulmonary effort is normal       Breath sounds: Normal breath sounds  Abdominal:      General: Abdomen is flat  Bowel sounds are normal       Palpations: Abdomen is soft   There is no mass  Tenderness: There is abdominal tenderness  There is no guarding or rebound  Hernia: No hernia is present  Comments: There was tenderness to deep palpation in the LUQ/LLQ   Skin:     General: Skin is warm and dry  Comments: Cellulitis on Right hip  Cellulitis on L  Abdomen  Indurations on LLQ   Neurological:      Mental Status: She is alert  Psychiatric:         Behavior: Behavior normal          Thought Content: Thought content normal       Comments: Has appeared slightly depressed the last two days  Could just be tired in the morning but reports sleeping well          Additional Data:     Labs:  Results from last 7 days   Lab Units 05/12/22  0548 05/09/22  0505 05/08/22  0543 05/07/22  1015 05/06/22  1216   WBC Thousand/uL 8 94   < > 11 26*   < > 19 38*   HEMOGLOBIN g/dL 9 9*   < > 9 6*   < > 11 4*   HEMATOCRIT % 34 2*   < > 31 4*   < > 39 6   PLATELETS Thousands/uL 456*   < > 351   < > 402*   BANDS PCT %  --   --   --   --  1   NEUTROS PCT %  --   --  84*   < >  --    LYMPHS PCT %  --   --  8*   < >  --    LYMPHO PCT %  --   --   --   --  2*   MONOS PCT %  --   --  5   < >  --    MONO PCT %  --   --   --   --  4   EOS PCT %  --   --  2   < > 0    < > = values in this interval not displayed  Results from last 7 days   Lab Units 05/11/22  0615 05/09/22  0505 05/08/22  0543   SODIUM mmol/L 135   < > 133*   POTASSIUM mmol/L 4 3   < > 3 7   CHLORIDE mmol/L 102   < > 100   CO2 mmol/L 29   < > 28   BUN mg/dL 27*   < > 26*   CREATININE mg/dL 0 92   < > 1 14   ANION GAP mmol/L 4   < > 5   CALCIUM mg/dL 8 5   < > 8 0*   ALBUMIN g/dL  --   --  2 4*   TOTAL BILIRUBIN mg/dL  --   --  0 70   ALK PHOS U/L  --   --  88   ALT U/L  --   --  11   AST U/L  --   --  16   GLUCOSE RANDOM mg/dL 105   < > 106    < > = values in this interval not displayed       Results from last 7 days   Lab Units 05/12/22  0548   INR  2 23*             Results from last 7 days   Lab Units 05/09/22  1359 05/07/22  1010 05/06/22  1401   LACTIC ACID mmol/L 0 8  --  1 0   PROCALCITONIN ng/ml  --  0 78*  --        Imaging: Reviewed radiology reports from this admission including: abdominal/pelvic CT scan    Recent Cultures (last 7 days):     Results from last 7 days   Lab Units 05/09/22  1816 05/08/22  1134 05/08/22  1133 05/06/22  1401   BLOOD CULTURE   --  No Growth at 72 hrs  No Growth at 72 hrs   Staphylococcus epidermidis*  Staphylococcus epidermidis*   GRAM STAIN RESULT   --   --   --  Gram positive cocci in clusters*  Gram positive cocci in clusters*   C DIFF TOXIN B BY PCR  Negative  --   --   --        Lines/Drains:  Invasive Devices  Report    Peripheral Intravenous Line  Duration           Peripheral IV 05/09/22 Right;Ventral (anterior) Forearm 3 days          Drain  Duration           External Urinary Catheter 3 days                Telemetry:        Last 24 Hours Medication List:   Current Facility-Administered Medications   Medication Dose Route Frequency Provider Last Rate    acetaminophen  650 mg Oral Q6H PRN Devonte Huggins PA-C      allopurinol  100 mg Oral Daily Mitchell Guerra PA-C      DULoxetine  20 mg Oral Daily Mitchell Guerra PA-C      furosemide  40 mg Oral Daily [facility]      levothyroxine  125 mcg Oral Early Morning Mitchell Guerra PA-C      melatonin  3 mg Oral HS PRN Nevin Bob MD      triamcinolone   Topical BID Rom Juárez DO      vancomycin  15 mg/kg (Adjusted) Intravenous Daily PRN Mason Delarosa MD      vancomycin  125 mg Oral Q12H [address] Mason Delarosa MD      warfarin  2 mg Oral Once per day on Sun Mon Wed Fri Rom Juárez DO      warfarin  4 mg Oral Once per day on Tue Thu Sat Devonte Huggins PA-C          Note generated by Resident and Medical Student Nevin Bob MD [facility]

## 2022-05-13 VITALS
SYSTOLIC BLOOD PRESSURE: 110 MMHG | BODY MASS INDEX: 55.32 KG/M2 | RESPIRATION RATE: 18 BRPM | OXYGEN SATURATION: 97 % | TEMPERATURE: 97.7 F | WEIGHT: 293 LBS | DIASTOLIC BLOOD PRESSURE: 52 MMHG | HEART RATE: 68 BPM | HEIGHT: 61 IN

## 2022-05-13 LAB
BACTERIA BLD CULT: NORMAL
ERYTHROCYTE [DISTWIDTH] IN BLOOD BY AUTOMATED COUNT: 17.2 % (ref 11.6–15.1)
HCT VFR BLD AUTO: 35 % (ref 34.8–46.1)
HGB BLD-MCNC: 10.4 G/DL (ref 11.5–15.4)
INR PPP: 2.38 (ref 0.84–1.19)
MCH RBC QN AUTO: 24.5 PG (ref 26.8–34.3)
MCHC RBC AUTO-ENTMCNC: 29.7 G/DL (ref 31.4–37.4)
MCV RBC AUTO: 82 FL (ref 82–98)
PLATELET # BLD AUTO: 458 THOUSANDS/UL (ref 149–390)
PMV BLD AUTO: 8.4 FL (ref 8.9–12.7)
PROTHROMBIN TIME: 25.6 SECONDS (ref 11.6–14.5)
RBC # BLD AUTO: 4.25 MILLION/UL (ref 3.81–5.12)
WBC # BLD AUTO: 10.59 THOUSAND/UL (ref 4.31–10.16)

## 2022-05-13 PROCEDURE — 99239 HOSP IP/OBS DSCHRG MGMT >30: CPT | Performed by: INTERNAL MEDICINE

## 2022-05-13 PROCEDURE — 85610 PROTHROMBIN TIME: CPT

## 2022-05-13 PROCEDURE — 99232 SBSQ HOSP IP/OBS MODERATE 35: CPT | Performed by: INTERNAL MEDICINE

## 2022-05-13 PROCEDURE — 85027 COMPLETE CBC AUTOMATED: CPT

## 2022-05-13 RX ORDER — CEPHALEXIN 500 MG/1
500 CAPSULE ORAL EVERY 6 HOURS SCHEDULED
Qty: 28 CAPSULE | Refills: 0 | Status: SHIPPED | OUTPATIENT
Start: 2022-05-13 | End: 2022-05-20

## 2022-05-13 RX ORDER — DOXYCYCLINE HYCLATE 100 MG/1
100 CAPSULE ORAL EVERY 12 HOURS SCHEDULED
Qty: 14 CAPSULE | Refills: 0 | Status: SHIPPED | OUTPATIENT
Start: 2022-05-13 | End: 2022-05-20

## 2022-05-13 RX ORDER — VANCOMYCIN HYDROCHLORIDE 125 MG/1
125 CAPSULE ORAL 2 TIMES DAILY
Qty: 18 CAPSULE | Refills: 0 | Status: SHIPPED | OUTPATIENT
Start: 2022-05-13 | End: 2022-05-13 | Stop reason: SDUPTHER

## 2022-05-13 RX ORDER — VANCOMYCIN HYDROCHLORIDE 125 MG/1
125 CAPSULE ORAL 2 TIMES DAILY
Qty: 18 CAPSULE | Refills: 0 | Status: SHIPPED | OUTPATIENT
Start: 2022-05-13 | End: 2022-05-22

## 2022-05-13 RX ORDER — CEPHALEXIN 500 MG/1
500 CAPSULE ORAL EVERY 6 HOURS SCHEDULED
Status: DISCONTINUED | OUTPATIENT
Start: 2022-05-13 | End: 2022-05-13 | Stop reason: HOSPADM

## 2022-05-13 RX ORDER — BACITRACIN, NEOMYCIN, POLYMYXIN B 400; 3.5; 5 [USP'U]/G; MG/G; [USP'U]/G
1 OINTMENT TOPICAL ONCE
Status: COMPLETED | OUTPATIENT
Start: 2022-05-13 | End: 2022-05-13

## 2022-05-13 RX ORDER — DOXYCYCLINE HYCLATE 100 MG/1
100 CAPSULE ORAL EVERY 12 HOURS SCHEDULED
Status: DISCONTINUED | OUTPATIENT
Start: 2022-05-13 | End: 2022-05-13 | Stop reason: HOSPADM

## 2022-05-13 RX ADMIN — LEVOTHYROXINE SODIUM 125 MCG: 125 TABLET ORAL at 05:04

## 2022-05-13 RX ADMIN — DULOXETINE HYDROCHLORIDE 20 MG: 20 CAPSULE, DELAYED RELEASE ORAL at 09:51

## 2022-05-13 RX ADMIN — Medication 125 MG: at 09:55

## 2022-05-13 RX ADMIN — VANCOMYCIN HYDROCHLORIDE 1750 MG: 5 INJECTION, POWDER, LYOPHILIZED, FOR SOLUTION INTRAVENOUS at 00:05

## 2022-05-13 RX ADMIN — CEPHALEXIN 500 MG: 500 CAPSULE ORAL at 07:31

## 2022-05-13 RX ADMIN — CEPHALEXIN 500 MG: 500 CAPSULE ORAL at 12:15

## 2022-05-13 RX ADMIN — BACITRACIN, NEOMYCIN, POLYMYXIN B 1 SMALL APPLICATION: 400; 3.5; 5 OINTMENT TOPICAL at 12:15

## 2022-05-13 RX ADMIN — TRIAMCINOLONE ACETONIDE 1 APPLICATION: 1 CREAM TOPICAL at 09:51

## 2022-05-13 RX ADMIN — DOXYCYCLINE 100 MG: 100 CAPSULE ORAL at 09:51

## 2022-05-13 RX ADMIN — ALLOPURINOL 100 MG: 100 TABLET ORAL at 09:51

## 2022-05-13 NOTE — DISCHARGE INSTR - AVS FIRST PAGE
Dear Veronika Shetty,     It was our pleasure to care for you here at [facility]  It is our hope that we were always able to exceed the expected standards for your care during your stay  You were hospitalized due to ***  You were cared for on the *** floor by Yimi Crowe MD under the service of Mary Caballero MD with the [facility] who covers for your primary care physician (PCP), ORACIO Ovalles, while you were hospitalized  If you have any questions or concerns related to this hospitalization, you may contact us at [phone]  For follow up as well as any medication refills, we recommend that you follow up with your primary care physician  A registered nurse will reach out to you by phone within a few days after your discharge to answer any additional questions that you may have after going home    However, at this time we provide for you here, the most important instructions / recommendations at discharge:     Notable Medication Adjustments -   Continue antibiotic treatment with Keflex 500 mg every 6 hours until 05/20/2022 to complete a 14 day course of antibiotic treatment for cellulitis  Continue antibiotic treatment with Vibramycin 100 mg every 12 hours until 05/20/2022 to complete a 14 day course of antibiotic treatment for cellulitis  Continue antibiotic treatment with vancomycin every 12 hours until 05/22/2022 (2 days after completing antibiotics for cellulitis) as prophylactic antibiotic against C diff infection  Testing Required after Discharge -   None  Important follow up information -   Schedule a follow-up appointment to be seen by your PCP within 1 week  Please review this entire after visit summary as additional general instructions including medication list, appointments, activity, diet, any pertinent wound care, and other additional recommendations from your care team that may be provided for you       Sincerely,     Sherrie Arenas MD

## 2022-05-13 NOTE — ASSESSMENT & PLAN NOTE
· Cellulitis of pannus on right side of abdomen, and left hip cellulitis  · Left Hip cellulitis may be in the setting of a prior acetabulum fracture;  Improving  · She has had history of MRSA in the past as well on RLE  · Cellulitis has shrunk significantly on both sides as of 5/12/22  · Leukocytosis resolved   · MRSA negative  · Repeat blood cultures results -ve at 4 days  · Initial Blood Clx 2/2 Positive for Staph epidermidis, may be a true infection  · Cellulitis has shrunken away from demarcation lines, although the right panniculi is still draining excessive amounts of fluid    · ID on board, appreciate recommendations  · Continue oral doxycycline and Keflex through 05/20/2022 to complete a 14 day course of antibiotic treatment  · Continue oral vancomycin through 05/22/2022 as antibiotic prophylaxis against C diff infection  · Due to body size, patient required course of antibiotics longer than usual - 14 days, through 5/20   · Outpatient follow-up with PCP within 1 week

## 2022-05-13 NOTE — DISCHARGE SUMMARY
[facility]  Discharge- Fili Shine 1950, 80 y o  female MRN: [id number]  Unit/Bed#: W -01 Encounter: [id number]  Primary Care Provider: ORACIO Pineda   Date and time admitted to hospital: 5/6/2022  5:48 PM    * Cellulitis  Assessment & Plan  · Cellulitis of pannus on right side of abdomen, and left hip cellulitis  · Left Hip cellulitis may be in the setting of a prior acetabulum fracture; Improving  · She has had history of MRSA in the past as well on RLE  · Cellulitis has shrunk significantly on both sides as of 5/12/22  · Leukocytosis resolved   · MRSA negative  · Repeat blood cultures results -ve at 4 days  · Initial Blood Clx 2/2 Positive for Staph epidermidis, may be a true infection  · Cellulitis has shrunken away from demarcation lines, although the right panniculi is still draining excessive amounts of fluid    · ID on board, appreciate recommendations  · Continue oral doxycycline and Keflex through 05/20/2022 to complete a 14 day course of antibiotic treatment  · Continue oral vancomycin through 05/22/2022 as antibiotic prophylaxis against C diff infection  · Due to body size, patient required course of antibiotics longer than usual - 14 days, through 5/20   · Outpatient follow-up with PCP within 1 week    Chronic atrial fibrillation (HCC)  Assessment & Plan  · Noted history of chronic atrial fibrillation  · Anticoagulated on warfarin    Recent Labs     05/11/22  0615 05/12/22  0548 05/13/22  0511   INR 1 88* 2 23* 2 38*     Received one time increased dose of 4 mg on 5/9 and then resumed home regimen  Goal INR 2-3  INR at goal    · Rate controlled  · Outpatient monitoring of INR   · Continue warfarin     C  difficile colitis  Assessment & Plan  · History of C diff infection back in January again noted last month  · C  Diff Panel negative     · On p o  Vancomycin - Will continue p o   Vancomycin while systemic antibiotics for cellulitis of the abdominal wall  · requires prophylaxis with p o  Vancomycin up to 2 days after of completing treatment for cellulitis (through 05/22/2022)    Chronic diastolic congestive heart failure [facility])  Assessment & Plan  Wt Readings from Last 3 Encounters:   05/08/22 (!) 212 kg (467 lb)   05/06/22 (!) 193 kg (425 lb)   04/04/22 (!) 181 kg (400 lb)     · Does not appear to be in acute exacerbation at this  · Continue home Lasix with hold parameters for SBP < 110      Extreme Obesity - BMI 70 and over, adult [facility])  Assessment & Plan  Body mass index is 88 24 kg/m²   Recommend incorporating a more whole foods plant-predominant diet along with decreasing consumption of red meats and processed foods   Consider intermittent fasting/keto diet   Per AHA guidelines, recommend moderate-vigorous intensity exercise for 30 minutes a day for 5 days a week or a total of 150 min/week   PT/OT eval - patient at baseline, and does not require PT/OT eval and treat    Dispo planning - return home with help  Caregiver confirms Rashaad Serjio she is able to assess patient at home and is aware of patient's arrival     Other specified hypothyroidism  Assessment & Plan  · Continue levothyroxine     Discharging Resident Physician: Manuel Carcamo MD  Attending: Julienne Carrington MD  PCP: ORACIO Guzman  Admission Date: 5/6/2022  Discharge Date: 05/13/22    Disposition:     Home - with established home help/caregiver      Reason for Admission: Cellulitis on the Right hip and left lower abdominal fold    Consultations During Hospital Stay:  · Infectious Disease, Pharmacy, Nursing Team    Procedures Performed:     · Wound/Cellulitis Care    Significant Findings / Test Results:     · BUN usually elevated at 27, INR for warfarin in therapeutic range, some hematologic abnormalities but not concerning at this time    CT abdomen pelvis wo contrast    Result Date: 5/6/2022  Impression: No acute intra-abdominal pelvic abnormality identified allowing for limitations described above  Mild diverticulosis without evidence for diverticulitis  Stable left ovarian hypodensity  Left renal atrophy, stable  Severe degenerative changes in remodeling in the right hip, chronic  Workstation performed: [id number]     Incidental Findings:   · None     Test Results Pending at Discharge (will require follow up): · None     Outpatient Tests Requested:  · None    Complications: The right hip cellulitis continues to drain  Will need continuous dressing changes to maintain an adequate recovery  Hospital Course:     Ria Rushing is a 80 y o  female patient who originally presented to the hospital on 5/6/2022 due to cellulitis found on the right hip and left abdominal fold that have been present for some time  She was admitted into the hospital and started on a course of IV Vancomycin to treat the cellulitis, and coverage for MRSE found on blood culture (which ID believes is a contaminant), and possible C  Diff infection  She was also given oral Vancomycin for C  Diff prophylaxis as this patient has a history of C  Diff Colitis  Vancomycin was discontinued on 5/12 after levels peaked in her blood and C  Diff PCR and MRSA culture both came back negative  She will be discharged on oral Doxycycline/Keflex for 14 days total   Patient's  cellulitis has been receding from the demarcated lines  Per infectious diseases consult, progress of the cellulitis should be monitored closely  Due to the patients body size, a longer course of antibiotics was needed to completely antibiotic treatment of the cellulitis  Condition at Discharge: fair     Discharge Day Visit / Exam:     Subjective:  Patient reports feeling good and is excited to go home  ROS was negative for and headaches, chest pain, heart palpitations, confusion, SOB, peripheral neuropathy, abdominal pain, constipation, and diarrhea     Vitals: Blood Pressure: 110/52 (05/13/22 0950)  Pulse: 68 (05/11/22 2156)  Temperature: 97 7 °F (36 5 °C) (05/13/22 0723)  Temp Source: Oral (05/11/22 2156)  Respirations: 18 (05/11/22 2156)  Height: 5' 1" (154 9 cm) (05/08/22 1405)  Weight - Scale: (!) 212 kg (467 lb) (05/08/22 1405)  SpO2: 97 % (05/13/22 0950)  Exam:   Physical Exam  Constitutional:       General: She is not in acute distress  Appearance: She is obese  She is not diaphoretic  HENT:      Head: Normocephalic and atraumatic  Right Ear: External ear normal       Left Ear: External ear normal       Nose: Nose normal    Eyes:      Conjunctiva/sclera: Conjunctivae normal    Cardiovascular:      Rate and Rhythm: Normal rate  Rhythm irregular  Pulses: Normal pulses  Heart sounds: Normal heart sounds  Comments: Patient has Afib  Pulmonary:      Effort: Pulmonary effort is normal       Breath sounds: Normal breath sounds  Abdominal:      General: Abdomen is flat  Bowel sounds are normal       Palpations: Abdomen is soft  Tenderness: There is abdominal tenderness  Comments: Mild tenderness to deep palpation in the LLQ   Skin:     General: Skin is warm and dry  Neurological:      Mental Status: She is alert and oriented to person, place, and time  Psychiatric:         Mood and Affect: Mood normal          Behavior: Behavior normal          Thought Content: Thought content normal          Discussion with Family: Patient Declined family contact as she has been contacting them via cell phone  Discharge instructions/Information to patient and family:   See after visit summary for information provided to patient and family  Provisions for Follow-Up Care:  See after visit summary for information related to follow-up care and any pertinent home health orders  Planned Readmission: None     Discharge Medications:  See after visit summary for reconciled discharge medications provided to patient and family        ** Please Note: This note has been constructed using a voice recognition system **

## 2022-05-13 NOTE — CASE MANAGEMENT
Case Management Progress Note    Patient name Reyes Ahumada  Location W /W -68 MRN [id number]  : 1950 Date 2022       LOS (days): 7  Geometric Mean LOS (GMLOS) (days): 3 20  Days to GMLOS:-3 5        OBJECTIVE:        Current admission status: Inpatient  Preferred Pharmacy:    [address] [address]  Phone: [phone] Fax: [phone] [address]  [address]  [address] [facility] [address]  Phone: [phone] Fax: [phone]    Primary Care Provider: Gladys Sandhoff, CRNP    Primary Insurance: MEDICARE  Secondary Insurance: A[employer]    PROGRESS NOTE:    CM received a call to move transport time up  MD aware, RN aware, Pt aware

## 2022-05-13 NOTE — ASSESSMENT & PLAN NOTE
· History of C diff infection back in January again noted last month  · C  Diff Panel negative     · On p o  Vancomycin - Will continue p o  Vancomycin while systemic antibiotics for cellulitis of the abdominal wall  · requires prophylaxis with p o   Vancomycin up to 2 days after of completing treatment for cellulitis (through 05/22/2022)

## 2022-05-13 NOTE — ASSESSMENT & PLAN NOTE
· Noted history of chronic atrial fibrillation  · Anticoagulated on warfarin    Recent Labs     05/11/22  0615 05/12/22  0548 05/13/22  0511   INR 1 88* 2 23* 2 38*     Received one time increased dose of 4 mg on 5/9 and then resumed home regimen    Goal INR 2-3  INR at goal    · Rate controlled  · Outpatient monitoring of INR   · Continue warfarin

## 2022-05-13 NOTE — ASSESSMENT & PLAN NOTE
Body mass index is 88 24 kg/m²   Recommend incorporating a more whole foods plant-predominant diet along with decreasing consumption of red meats and processed foods   Consider intermittent fasting/keto diet   Per AHA guidelines, recommend moderate-vigorous intensity exercise for 30 minutes a day for 5 days a week or a total of 150 min/week   PT/OT eval - patient at baseline, and does not require PT/OT eval and treat    Dispo planning - return home with help    Caregiver confirms Mulu Trimble she is able to assess patient at home and is aware of patient's arrival

## 2022-05-13 NOTE — PROGRESS NOTES
Progress Note - Infectious Disease   Joe Bush 80 y o  female MRN: [id number]  Unit/Bed#: W -01 Encounter: [id number]      Impression/Recommendations:  1  Panniculitis/lower abdominal cellulitis   Cellulitis has the appearance of streptococcal infection   Patient continues slow clinical improvement   Slow improvement is likely in this morbidly obese patient with panniculitis, due to low blood flow to adipose tissue   With presence of Staphylococcus epidermidis bacteremia (see below), will treat patient with vancomycin  At this point, antibiotic can be transitioned to p o  Change antibiotic to p o  Doxycycline/Keflex  Serial exams  Monitor temperature/WBC  Treat x 14 days total antibiotic      2  MRSE bacteremia   Although there was growth in both admission blood cultures, I suspect these are contaminated blood draw   Patient has very difficult blood drawn IV access   She has no indwelling intravascular foreign body  Repeat blood cultures have no growth  Regardless,  IV vancomycin course was adequate treatment for MRSE bacteremia  No further antibiotic needed for this      3  Recently treated C difficile colitis   No evidence of active colitis   Will keep patient on p o  Vancomycin prophylaxis  Continue p o  Vancomycin prophylaxis x 72 hours beyond systemic antibiotic      4  Morbid obesity   It would be difficult to obtain adequate antibiotic level via oral route in this patient  Will Simmering will need a somewhat prolonged IV antibiotic course      Discussed with patient in detail regarding the above plan  Discussed with primary service earlier  Okay for discharge from ID viewpoint      Antibiotics:  Vancomycin # 7     Subjective:  Abdominal wall pain continues to improve, minimal now  Temperature stays down   No chills    She is tolerating antibiotic well   No nausea, vomiting or diarrhea      Objective:  Vitals:  Temp:  [97 7 °F (36 5 °C)-98 °F (36 7 °C)] 97 7 °F (36 5 °C)  BP: ()/(45-62) 99/56  SpO2:  [92 %-97 %] 96 %  Temp (24hrs), Av 8 °F (36 6 °C), Min:97 7 °F (36 5 °C), Max:98 °F (36 7 °C)  Current: Temperature: 97 7 °F (36 5 °C)    Physical Exam:     General: Awake, alert, cooperative, no distress  Neck:  Supple  No mass  No lymphadenopathy  Lungs: Expansion symmetric, no rales, no wheezing, respirations unlabored  Heart:  Regular rate and rhythm, S1 and S2 normal, no murmur  Abdomen: Soft, obese  Lower abdomen with improving induration, erythema, warmth  Minimal tenderness  Stable serous drainage from groin  Extremities: Stable edema  No erythema/warmth  No training ulcer  Nontender to palpation  Skin:  No rash  Neuro: Moves all extremities  Invasive Devices  Report    Peripheral Intravenous Line  Duration           Peripheral IV 22 Right;Ventral (anterior) Hand <1 day          Drain  Duration           External Urinary Catheter 4 days                Labs studies:   I have personally reviewed pertinent labs  Results from last 7 days   Lab Units 22  0615 05/10/22  0526 22  0505 22  0543 22  1015 22  1216   POTASSIUM mmol/L 4 3 3 8 4 5 3 7   < > 4 0   CHLORIDE mmol/L 102 100 100 100   < > 96   CO2 mmol/L 29 29 29 28   < > 30   BUN mg/dL 27* 27* 24 26*   < > 20   CREATININE mg/dL 0 92 1 06 1 08 1 14   < > 1 15   EGFR ml/min/1 73sq m 63 53 52 48   < > 48   CALCIUM mg/dL 8 5 8 3* 8 2* 8 0*   < > 8 6   AST U/L  --   --   --  16  --  20   ALT U/L  --   --   --  11  --  12   ALK PHOS U/L  --   --   --  88  --  115*    < > = values in this interval not displayed  Results from last 7 days   Lab Units 22  0511 22  0548 22  0615   WBC Thousand/uL 10 59* 8 94 8 12   HEMOGLOBIN g/dL 10 4* 9 9* 10 1*   PLATELETS Thousands/uL 458* 456* 421*     Results from last 7 days   Lab Units 22  1823 22  1816 22  1134 22  1133 22  1401   BLOOD CULTURE   --   --  No Growth After 4 Days   No Growth After 4 Days  Staphylococcus epidermidis*  Staphylococcus epidermidis*   GRAM STAIN RESULT   --   --   --   --  Gram positive cocci in clusters*  Gram positive cocci in clusters*   MRSA CULTURE ONLY  No Methicillin Resistant Staphlyococcus aureus (MRSA) isolated  --   --   --   --    C DIFF TOXIN B BY PCR   --  Negative  --   --   --        Imaging Studies:   I have personally reviewed pertinent imaging study reports and images in PACS  EKG, Pathology, and Other Studies:   I have personally reviewed pertinent reports

## 2022-05-14 LAB — BACTERIA BLD CULT: NORMAL

## 2022-05-27 ENCOUNTER — HOSPITAL ENCOUNTER (INPATIENT)
Facility: HOSPITAL | Age: 72
LOS: 18 days | Discharge: HOME HEALTH | DRG: 603 | End: 2022-06-14
Attending: EMERGENCY MEDICINE | Admitting: INTERNAL MEDICINE
Payer: MEDICARE

## 2022-05-27 DIAGNOSIS — L03.311 ABDOMINAL WALL CELLULITIS: Primary | ICD-10-CM

## 2022-05-27 DIAGNOSIS — E66.9 SUPER OBESITY: Chronic | ICD-10-CM

## 2022-05-27 DIAGNOSIS — A04.72 C. DIFFICILE COLITIS: ICD-10-CM

## 2022-05-27 DIAGNOSIS — N18.9 ACUTE KIDNEY INJURY SUPERIMPOSED ON CKD (HCC): ICD-10-CM

## 2022-05-27 DIAGNOSIS — M79.3 PANNICULITIS: ICD-10-CM

## 2022-05-27 DIAGNOSIS — Z86.19 HISTORY OF CLOSTRIDIOIDES DIFFICILE INFECTION: ICD-10-CM

## 2022-05-27 DIAGNOSIS — N17.9 ACUTE KIDNEY INJURY SUPERIMPOSED ON CKD (HCC): ICD-10-CM

## 2022-05-27 DIAGNOSIS — M25.512 ACUTE PAIN OF LEFT SHOULDER: ICD-10-CM

## 2022-05-27 DIAGNOSIS — R78.81 GRAM-POSITIVE BACTEREMIA: ICD-10-CM

## 2022-05-27 PROBLEM — I50.32 CHRONIC DIASTOLIC CONGESTIVE HEART FAILURE (HCC): Chronic | Status: ACTIVE | Noted: 2020-10-03

## 2022-05-27 PROBLEM — I48.20 CHRONIC ATRIAL FIBRILLATION (HCC): Chronic | Status: ACTIVE | Noted: 2022-02-18

## 2022-05-27 PROBLEM — I89.0 LYMPHEDEMA OF EXTREMITY: Chronic | Status: ACTIVE | Noted: 2021-08-19

## 2022-05-27 PROBLEM — G89.4 CHRONIC PAIN DISORDER: Status: RESOLVED | Noted: 2017-07-11 | Resolved: 2022-05-27

## 2022-05-27 PROBLEM — F41.8 DEPRESSION WITH ANXIETY: Chronic | Status: ACTIVE | Noted: 2017-07-15

## 2022-05-27 LAB
ALBUMIN SERPL BCP-MCNC: 2.8 G/DL (ref 3.5–5)
ALP SERPL-CCNC: 109 U/L (ref 34–104)
ALT SERPL W P-5'-P-CCNC: 9 U/L (ref 7–52)
ANION GAP SERPL CALCULATED.3IONS-SCNC: 8 MMOL/L (ref 4–13)
AST SERPL W P-5'-P-CCNC: 16 U/L (ref 13–39)
BASOPHILS # BLD AUTO: 0.07 THOUSANDS/ΜL (ref 0–0.1)
BASOPHILS NFR BLD AUTO: 1 % (ref 0–1)
BILIRUB SERPL-MCNC: 0.68 MG/DL (ref 0.2–1)
BUN SERPL-MCNC: 25 MG/DL (ref 5–25)
CALCIUM ALBUM COR SERPL-MCNC: 9.5 MG/DL (ref 8.3–10.1)
CALCIUM SERPL-MCNC: 8.5 MG/DL (ref 8.4–10.2)
CHLORIDE SERPL-SCNC: 99 MMOL/L (ref 96–108)
CO2 SERPL-SCNC: 29 MMOL/L (ref 21–32)
CREAT SERPL-MCNC: 1.02 MG/DL (ref 0.6–1.3)
EOSINOPHIL # BLD AUTO: 0.22 THOUSAND/ΜL (ref 0–0.61)
EOSINOPHIL NFR BLD AUTO: 2 % (ref 0–6)
ERYTHROCYTE [DISTWIDTH] IN BLOOD BY AUTOMATED COUNT: 17 % (ref 11.6–15.1)
GFR SERPL CREATININE-BSD FRML MDRD: 55 ML/MIN/1.73SQ M
GLUCOSE SERPL-MCNC: 131 MG/DL (ref 65–140)
HCT VFR BLD AUTO: 38.6 % (ref 34.8–46.1)
HGB BLD-MCNC: 11.6 G/DL (ref 11.5–15.4)
IMM GRANULOCYTES # BLD AUTO: 0.04 THOUSAND/UL (ref 0–0.2)
IMM GRANULOCYTES NFR BLD AUTO: 0 % (ref 0–2)
LYMPHOCYTES # BLD AUTO: 1.21 THOUSANDS/ΜL (ref 0.6–4.47)
LYMPHOCYTES NFR BLD AUTO: 11 % (ref 14–44)
MCH RBC QN AUTO: 24.2 PG (ref 26.8–34.3)
MCHC RBC AUTO-ENTMCNC: 30.1 G/DL (ref 31.4–37.4)
MCV RBC AUTO: 80 FL (ref 82–98)
MONOCYTES # BLD AUTO: 0.59 THOUSAND/ΜL (ref 0.17–1.22)
MONOCYTES NFR BLD AUTO: 6 % (ref 4–12)
NEUTROPHILS # BLD AUTO: 8.55 THOUSANDS/ΜL (ref 1.85–7.62)
NEUTS SEG NFR BLD AUTO: 80 % (ref 43–75)
NRBC BLD AUTO-RTO: 0 /100 WBCS
PLATELET # BLD AUTO: 459 THOUSANDS/UL (ref 149–390)
PMV BLD AUTO: 9 FL (ref 8.9–12.7)
POTASSIUM SERPL-SCNC: 3.7 MMOL/L (ref 3.5–5.3)
PROCALCITONIN SERPL-MCNC: 0.06 NG/ML
PROT SERPL-MCNC: 7.2 G/DL (ref 6.4–8.4)
RBC # BLD AUTO: 4.8 MILLION/UL (ref 3.81–5.12)
SODIUM SERPL-SCNC: 136 MMOL/L (ref 135–147)
WBC # BLD AUTO: 10.68 THOUSAND/UL (ref 4.31–10.16)

## 2022-05-27 PROCEDURE — 85025 COMPLETE CBC W/AUTO DIFF WBC: CPT | Performed by: EMERGENCY MEDICINE

## 2022-05-27 PROCEDURE — 99285 EMERGENCY DEPT VISIT HI MDM: CPT | Performed by: EMERGENCY MEDICINE

## 2022-05-27 PROCEDURE — 84145 PROCALCITONIN (PCT): CPT | Performed by: PHYSICIAN ASSISTANT

## 2022-05-27 PROCEDURE — 99223 1ST HOSP IP/OBS HIGH 75: CPT | Performed by: PHYSICIAN ASSISTANT

## 2022-05-27 PROCEDURE — 80053 COMPREHEN METABOLIC PANEL: CPT | Performed by: EMERGENCY MEDICINE

## 2022-05-27 PROCEDURE — 87040 BLOOD CULTURE FOR BACTERIA: CPT | Performed by: EMERGENCY MEDICINE

## 2022-05-27 PROCEDURE — 87154 CUL TYP ID BLD PTHGN 6+ TRGT: CPT | Performed by: EMERGENCY MEDICINE

## 2022-05-27 PROCEDURE — 36415 COLL VENOUS BLD VENIPUNCTURE: CPT | Performed by: EMERGENCY MEDICINE

## 2022-05-27 PROCEDURE — 96365 THER/PROPH/DIAG IV INF INIT: CPT

## 2022-05-27 PROCEDURE — 96367 TX/PROPH/DG ADDL SEQ IV INF: CPT

## 2022-05-27 PROCEDURE — 99285 EMERGENCY DEPT VISIT HI MDM: CPT

## 2022-05-27 RX ORDER — ENOXAPARIN SODIUM 100 MG/ML
60 INJECTION SUBCUTANEOUS 2 TIMES DAILY
Status: DISCONTINUED | OUTPATIENT
Start: 2022-05-28 | End: 2022-05-30

## 2022-05-27 RX ORDER — FUROSEMIDE 10 MG/ML
40 INJECTION INTRAMUSCULAR; INTRAVENOUS
Status: DISCONTINUED | OUTPATIENT
Start: 2022-05-28 | End: 2022-06-06

## 2022-05-27 RX ORDER — ONDANSETRON 2 MG/ML
4 INJECTION INTRAMUSCULAR; INTRAVENOUS EVERY 6 HOURS PRN
Status: DISCONTINUED | OUTPATIENT
Start: 2022-05-27 | End: 2022-06-14 | Stop reason: HOSPADM

## 2022-05-27 RX ORDER — SACCHAROMYCES BOULARDII 250 MG
250 CAPSULE ORAL 2 TIMES DAILY
Status: DISCONTINUED | OUTPATIENT
Start: 2022-05-28 | End: 2022-06-14 | Stop reason: HOSPADM

## 2022-05-27 RX ORDER — CEFEPIME HYDROCHLORIDE 2 G/50ML
2000 INJECTION, SOLUTION INTRAVENOUS ONCE
Status: COMPLETED | OUTPATIENT
Start: 2022-05-27 | End: 2022-05-27

## 2022-05-27 RX ORDER — ACETAMINOPHEN 325 MG/1
650 TABLET ORAL EVERY 4 HOURS PRN
Status: DISCONTINUED | OUTPATIENT
Start: 2022-05-27 | End: 2022-06-14 | Stop reason: HOSPADM

## 2022-05-27 RX ADMIN — SODIUM CHLORIDE 500 ML: 0.9 INJECTION, SOLUTION INTRAVENOUS at 20:47

## 2022-05-27 RX ADMIN — VANCOMYCIN HYDROCHLORIDE 1750 MG: 1 INJECTION, POWDER, LYOPHILIZED, FOR SOLUTION INTRAVENOUS at 21:58

## 2022-05-27 RX ADMIN — CEFEPIME HYDROCHLORIDE 2000 MG: 2 INJECTION, SOLUTION INTRAVENOUS at 20:46

## 2022-05-28 LAB
ANION GAP SERPL CALCULATED.3IONS-SCNC: 4 MMOL/L (ref 4–13)
BUN SERPL-MCNC: 23 MG/DL (ref 5–25)
CALCIUM SERPL-MCNC: 8.5 MG/DL (ref 8.4–10.2)
CHLORIDE SERPL-SCNC: 103 MMOL/L (ref 96–108)
CO2 SERPL-SCNC: 29 MMOL/L (ref 21–32)
CREAT SERPL-MCNC: 0.95 MG/DL (ref 0.6–1.3)
ERYTHROCYTE [DISTWIDTH] IN BLOOD BY AUTOMATED COUNT: 16.7 % (ref 11.6–15.1)
GFR SERPL CREATININE-BSD FRML MDRD: 60 ML/MIN/1.73SQ M
GLUCOSE SERPL-MCNC: 114 MG/DL (ref 65–140)
HCT VFR BLD AUTO: 36.1 % (ref 34.8–46.1)
HGB BLD-MCNC: 10.6 G/DL (ref 11.5–15.4)
INR PPP: 3.97 (ref 0.84–1.19)
MCH RBC QN AUTO: 24.5 PG (ref 26.8–34.3)
MCHC RBC AUTO-ENTMCNC: 29.4 G/DL (ref 31.4–37.4)
MCV RBC AUTO: 84 FL (ref 82–98)
PLATELET # BLD AUTO: 390 THOUSANDS/UL (ref 149–390)
PMV BLD AUTO: 8.6 FL (ref 8.9–12.7)
POTASSIUM SERPL-SCNC: 3.7 MMOL/L (ref 3.5–5.3)
PROCALCITONIN SERPL-MCNC: 0.06 NG/ML
PROTHROMBIN TIME: 37.5 SECONDS (ref 11.6–14.5)
RBC # BLD AUTO: 4.32 MILLION/UL (ref 3.81–5.12)
SODIUM SERPL-SCNC: 136 MMOL/L (ref 135–147)
WBC # BLD AUTO: 9.37 THOUSAND/UL (ref 4.31–10.16)

## 2022-05-28 PROCEDURE — 99233 SBSQ HOSP IP/OBS HIGH 50: CPT | Performed by: NURSE PRACTITIONER

## 2022-05-28 PROCEDURE — 80048 BASIC METABOLIC PNL TOTAL CA: CPT | Performed by: PHYSICIAN ASSISTANT

## 2022-05-28 PROCEDURE — 84145 PROCALCITONIN (PCT): CPT | Performed by: PHYSICIAN ASSISTANT

## 2022-05-28 PROCEDURE — 97163 PT EVAL HIGH COMPLEX 45 MIN: CPT

## 2022-05-28 PROCEDURE — 85610 PROTHROMBIN TIME: CPT | Performed by: PHYSICIAN ASSISTANT

## 2022-05-28 PROCEDURE — 36415 COLL VENOUS BLD VENIPUNCTURE: CPT | Performed by: PHYSICIAN ASSISTANT

## 2022-05-28 PROCEDURE — 85027 COMPLETE CBC AUTOMATED: CPT | Performed by: PHYSICIAN ASSISTANT

## 2022-05-28 RX ORDER — TRIAMCINOLONE ACETONIDE 1 MG/G
CREAM TOPICAL 2 TIMES DAILY
Status: DISCONTINUED | OUTPATIENT
Start: 2022-05-28 | End: 2022-06-14 | Stop reason: HOSPADM

## 2022-05-28 RX ORDER — WARFARIN SODIUM 2 MG/1
2 TABLET ORAL
Status: DISCONTINUED | OUTPATIENT
Start: 2022-05-29 | End: 2022-05-29

## 2022-05-28 RX ORDER — DULOXETIN HYDROCHLORIDE 20 MG/1
20 CAPSULE, DELAYED RELEASE ORAL DAILY
Status: DISCONTINUED | OUTPATIENT
Start: 2022-05-28 | End: 2022-06-14 | Stop reason: HOSPADM

## 2022-05-28 RX ORDER — WARFARIN SODIUM 2 MG/1
4 TABLET ORAL
Status: DISCONTINUED | OUTPATIENT
Start: 2022-05-30 | End: 2022-05-29

## 2022-05-28 RX ORDER — ALLOPURINOL 100 MG/1
100 TABLET ORAL DAILY
Status: DISCONTINUED | OUTPATIENT
Start: 2022-05-28 | End: 2022-06-14 | Stop reason: HOSPADM

## 2022-05-28 RX ORDER — WARFARIN SODIUM 4 MG/1
4 TABLET ORAL
Status: DISCONTINUED | OUTPATIENT
Start: 2022-05-28 | End: 2022-05-28

## 2022-05-28 RX ADMIN — DULOXETINE 20 MG: 20 CAPSULE, DELAYED RELEASE ORAL at 08:22

## 2022-05-28 RX ADMIN — ALLOPURINOL 100 MG: 100 TABLET ORAL at 08:24

## 2022-05-28 RX ADMIN — Medication 250 MG: at 17:31

## 2022-05-28 RX ADMIN — LEVOTHYROXINE SODIUM 125 MCG: 25 TABLET ORAL at 08:23

## 2022-05-28 RX ADMIN — ACETAMINOPHEN 650 MG: 325 TABLET ORAL at 16:35

## 2022-05-28 RX ADMIN — ENOXAPARIN SODIUM 60 MG: 100 INJECTION SUBCUTANEOUS at 08:35

## 2022-05-28 RX ADMIN — Medication 125 MG: at 06:07

## 2022-05-28 RX ADMIN — TRIAMCINOLONE ACETONIDE: 1 CREAM TOPICAL at 08:38

## 2022-05-28 RX ADMIN — VANCOMYCIN HYDROCHLORIDE 2000 MG: 1 INJECTION, POWDER, LYOPHILIZED, FOR SOLUTION INTRAVENOUS at 08:26

## 2022-05-28 RX ADMIN — Medication 125 MG: at 17:31

## 2022-05-28 RX ADMIN — Medication 250 MG: at 08:24

## 2022-05-28 RX ADMIN — Medication 125 MG: at 12:29

## 2022-05-28 RX ADMIN — ENOXAPARIN SODIUM 60 MG: 100 INJECTION SUBCUTANEOUS at 17:31

## 2022-05-28 NOTE — ASSESSMENT & PLAN NOTE
· Continue Coumadin  · INR on 5/26 was 3 7 - Coumadin held   · INR today 3 97-hold Coumadin today, check INR tomorrow

## 2022-05-28 NOTE — PLAN OF CARE
Problem: Potential for Falls  Goal: Patient will remain free of falls  Description: INTERVENTIONS:  - Educate patient/family on patient safety including physical limitations  - Instruct patient to call for assistance with activity   - Consult OT/PT to assist with strengthening/mobility   - Keep Call bell within reach  - Keep bed low and locked with side rails adjusted as appropriate  - Keep care items and personal belongings within reach  - Initiate and maintain comfort rounds  - Make Fall Risk Sign visible to staff  - Offer Toileting every 2 Hours, in advance of need  - Initiate/Maintain bed alarm  - Obtain necessary fall risk management equipment: bed alarm  - Apply yellow socks and bracelet for high fall risk patients  - Consider moving patient to room near nurses station  Outcome: Progressing     Problem: PAIN - ADULT  Goal: Verbalizes/displays adequate comfort level or baseline comfort level  Description: Interventions:  - Encourage patient to monitor pain and request assistance  - Assess pain using appropriate pain scale  - Administer analgesics based on type and severity of pain and evaluate response  - Implement non-pharmacological measures as appropriate and evaluate response  - Consider cultural and social influences on pain and pain management  - Notify physician/advanced practitioner if interventions unsuccessful or patient reports new pain  Outcome: Progressing     Problem: INFECTION - ADULT  Goal: Absence or prevention of progression during hospitalization  Description: INTERVENTIONS:  - Assess and monitor for signs and symptoms of infection  - Monitor lab/diagnostic results  - Monitor all insertion sites, i e  indwelling lines, tubes, and drains  - Monitor endotracheal if appropriate and nasal secretions for changes in amount and color  - [place] appropriate cooling/warming therapies per order  - Administer medications as ordered  - Instruct and encourage patient and family to use good hand hygiene technique  - Identify and instruct in appropriate isolation precautions for identified infection/condition  Outcome: Progressing  Goal: Absence of fever/infection during neutropenic period  Description: INTERVENTIONS:  - Monitor WBC    Outcome: Progressing     Problem: SAFETY ADULT  Goal: Maintain or return to baseline ADL function  Description: INTERVENTIONS:  -  Assess patient's ability to carry out ADLs; assess patient's baseline for ADL function and identify physical deficits which impact ability to perform ADLs (bathing, care of mouth/teeth, toileting, grooming, dressing, etc )  - Assess/evaluate cause of self-care deficits   - Assess range of motion  - Assess patient's mobility; develop plan if impaired  - Assess patient's need for assistive devices and provide as appropriate  - Encourage maximum independence but intervene and supervise when necessary  - Involve family in performance of ADLs  - Assess for home care needs following discharge   - Consider OT consult to assist with ADL evaluation and planning for discharge  - Provide patient education as appropriate  Outcome: Progressing  Goal: Maintains/Returns to pre admission functional level  Description: INTERVENTIONS:  - Perform BMAT or MOVE assessment daily    - Set and communicate daily mobility goal to care team and patient/family/caregiver  - Collaborate with rehabilitation services on mobility goals if consulted  - Perform Range of Motion 3 times a day  - Reposition patient every 2 hours    - Dangle patient 3 times a day  - Stand patient 3 times a day  - Ambulate patient 3 times a day  - Out of bed to chair 3 times a day   - Out of bed for meals 3 times a day  - Out of bed for toileting  - Record patient progress and toleration of activity level   Outcome: Progressing     Problem: DISCHARGE PLANNING  Goal: Discharge to home or other facility with appropriate resources  Description: INTERVENTIONS:  - Identify barriers to discharge w/patient and caregiver  - Arrange for needed discharge resources and transportation as appropriate  - Identify discharge learning needs (meds, wound care, etc )  - Arrange for interpretive services to assist at discharge as needed  - Refer to Case Management Department for coordinating discharge planning if the patient needs post-hospital services based on physician/advanced practitioner order or complex needs related to functional status, cognitive ability, or social support system  Outcome: Progressing     Problem: Knowledge Deficit  Goal: Patient/family/caregiver demonstrates understanding of disease process, treatment plan, medications, and discharge instructions  Description: Complete learning assessment and assess knowledge base    Interventions:  - Provide teaching at level of understanding  - Provide teaching via preferred learning methods  Outcome: Progressing     Problem: SKIN/TISSUE INTEGRITY - ADULT  Goal: Skin Integrity remains intact(Skin Breakdown Prevention)  Description: Assess:  -Perform Gucci assessment every shift   -Clean and moisturize skin every 2 hours  -Inspect skin when repositioning, toileting, and assisting with ADLS  -Assess under medical devices such as  every   -Assess extremities for adequate circulation and sensation     Bed Management:  -Have minimal linens on bed & keep smooth, unwrinkled  -Change linens as needed when moist or perspiring  -Avoid sitting or lying in one position for more than 2 hours while in bed  -Keep HOB at 30 degrees     Toileting:  -Offer bedside commode  -Assess for incontinence every 2 hours  -Use incontinent care products after each incontinent episode such as blue pads    Activity:  -Mobilize patient 3 times a day  -Encourage activity and walks on unit  -Encourage or provide ROM exercises   -Turn and reposition patient every 2 Hours  -Use appropriate equipment to lift or move patient in bed  -Instruct/ Assist with weight shifting every  when out of bed in chair  -Consider limitation of chair time  hour intervals    Skin Care:  -Avoid use of baby powder, tape, friction and shearing, hot water or constrictive clothing  -Relieve pressure over bony prominences using   -Do not massage red bony areas    Next Steps:  -Teach patient strategies to minimize risks such as    -Consider consults to  interdisciplinary teams such as   Outcome: Progressing  Goal: Incision(s), wounds(s) or drain site(s) healing without S/S of infection  Description: INTERVENTIONS  - Assess and document dressing, incision, wound bed, drain sites and surrounding tissue  - Provide patient and family education  - Perform skin care/dressing changes every   Outcome: Progressing  Goal: Pressure injury heals and does not worsen  Description: Interventions:  - Implement low air loss mattress or specialty surface (Criteria met)  - Apply silicone foam dressing  - Instruct/assist with weight shifting every  minutes when in chair   - Limit chair time to  hour intervals  - Use special pressure reducing interventions such as  when in chair   - Apply fecal or urinary incontinence containment device   - Perform passive or active ROM every   - Turn and reposition patient & offload bony prominences every  hours   - Utilize friction reducing device or surface for transfers   - Consider consults to  interdisciplinary teams such as   - Use incontinent care products after each incontinent episode such as   - Consider nutrition services referral as needed  Outcome: Progressing     Problem: MOBILITY - ADULT  Goal: Maintain or return to baseline ADL function  Description: INTERVENTIONS:  -  Assess patient's ability to carry out ADLs; assess patient's baseline for ADL function and identify physical deficits which impact ability to perform ADLs (bathing, care of mouth/teeth, toileting, grooming, dressing, etc )  - Assess/evaluate cause of self-care deficits   - Assess range of motion  - Assess patient's mobility; develop plan if impaired  - Assess patient's need for assistive devices and provide as appropriate  - Encourage maximum independence but intervene and supervise when necessary  - Involve family in performance of ADLs  - Assess for home care needs following discharge   - Consider OT consult to assist with ADL evaluation and planning for discharge  - Provide patient education as appropriate  Outcome: Progressing  Goal: Maintains/Returns to pre admission functional level  Description: INTERVENTIONS:  - Perform BMAT or MOVE assessment daily    - Set and communicate daily mobility goal to care team and patient/family/caregiver  - Collaborate with rehabilitation services on mobility goals if consulted  - Perform Range of Motion  times a day  - Reposition patient every  hours    - Dangle patient 3 times a day  - Stand patient 3 times a day  - Ambulate patient 3 times a day  - Out of bed to chair 3 times a day   - Out of bed for meals 3 times a day  - Out of bed for toileting  - Record patient progress and toleration of activity level   Outcome: Progressing

## 2022-05-28 NOTE — ASSESSMENT & PLAN NOTE
Lab Results   Component Value Date    EGFR 55 05/27/2022    EGFR 63 05/11/2022    EGFR 53 05/10/2022    CREATININE 1 02 05/27/2022    CREATININE 0 92 05/11/2022    CREATININE 1 06 05/10/2022   · Creatinine stable

## 2022-05-28 NOTE — ED NOTES
Pt assisted onto and off of bedpan  Clean chucks placed under pt, diaper placed on L hip area per pt request to collect seepage from L hip wound  Call bell in lap, pt covered with blankets, lights turned down for comfort  Pt has no other needs at this time       Destin Carmichael RN  05/28/22 0154

## 2022-05-28 NOTE — ASSESSMENT & PLAN NOTE
[facility] Heart Progress Note      Patient: Jerzy Younger    Unit/Bed: V964/J446-22  YOB: 1972  MRN: [id number]  Admit Date:  3/20/2021  Hospital Day: 3    Rounding Date: 3/25/2021    Rounding Cardiologist:  TIFFANIE Romano MD    PRIMARY Cardiologist: Fabiola Romano    Subjective Complaint:   Had a little difficulty with the language barrier. Unable to get  because patient was getting second part of her nuclear scan. Physical Examination:     BP 95/62   Pulse 72   Temp 97 °F (36.1 °C) (Axillary)   Resp 18   Ht 6' (1.829 m)   Wt (!) 311 lb 8.2 oz (141.3 kg)   SpO2 95%   BMI 42.25 kg/m²         Intake/Output Summary (Last 24 hours) at 3/25/2021 1033  Last data filed at 3/24/2021 2148  Gross per 24 hour   Intake 360 ml   Output --   Net 360 ml                  Jerzy Younger examined at bedside in in no apparent distress. Focused exam reveals:     Cardiac: Heart regular rate and rhythm     Lungs:  clear to auscultation bilaterally- no wheezes, rales or rhonchi, normal air movement, no respiratory distress    Extremities:   1+ edema    Telemetry:     Sinus mechanism, and occasional sinus tachycardia and rare PVC-telemetry difficult to interpret due to patient's body habitus. No definite atrial fibrillation        LABS:   CBC: No results for input(s): WBC, HGB, PLT in the last 72 hours. BMP:    Recent Labs     03/23/21  0604 03/24/21  0554    135   K 4.0 4.0   CL 97 99   CO2 24 24   BUN 18 21*   CREATININE 0.69 0.79   GLUCOSE 378* 382*              Troponin: No results for input(s): TROPONINT in the last 72 hours. BNP: No results for input(s): PROBNP in the last 72 hours. INR: No results for input(s): INR in the last 72 hours. Mg:   Recent Labs     03/23/21  0604   MG 2.3       Cardiac Testing: Moderate concentric left ventricular hypertrophy. Normal left ventricular size and function.    Left ventricular ejection fraction is visually · Continue levothyroxine estimated at 55-60%. Normal diastolic parameters. Mildly enlarged right ventricle cavity. Normal RV systolic function. Trivial mitral regurgitation. Aortic valve appears to be tricuspid. Trivial tricuspid regurgitation with estimated RVSP of 28 mm Hg. Small circumferential pericardial effusion without hemodynamic compromise. Signature    Assessment:  Patient with risk factors for coronary disease, primarily diabetes  Chest pain is somewhat atypical in nature--tactile and worse with movement of left shoulder  No objective evidence for ischemia by troponin or EKG  Morbid obesity  Diabetes  Echocardiogram was unremarkable, but stress test showed defect, and second portion is necessary to determine whether this is diaphragmatic attenuation or ischemia. This is being performed this morning  Blood pressure is actually a little low  Presumed sleep apnea  Plan:  1. Second portion of stress test being performed today. Most likely can go home unless there is a great deal of ischemia. If mild ischemia consider outpatient heart catheterization. Certainly if this is felt to be diaphragmatic attenuation, patient could be discharged home  2. Consideration for low-dose beta-blocker could be made, but diabetes is an issue, and presently is certainly hyperglycemic. However possibility of hypoglycemia may lessen the advisability for beta-blocker. Consideration for low-dose Cardizem could be made in lieu of her outpatient amlodipine  3. Patient was on statin, but liver function tests were elevated. We will hold off on that at this time. Further work-up per primary service  4. We will reconcile cardiac meds pending outcome of stress test    Addendum: See Dr. Cindy Woods interpretation. Tracie Solares showed some ischemia in the anterior wall-less than 10% myocardium. We will consult intervention about heart catheterization-Dr. Nancy Nelson. She was notified    See Dr. Sanchez Bears note.   Per conversation of family, they would prefer outpatient visit with Dr. Augusto Ocampo which has been arranged. Would discharge home on low-dose beta-blocker to start, but if negative cath then will stop this medication. Patient will also get nitroglycerin as needed. Given gastritis, aspirin will be held.   See orders  Electronically signed by Elpidio Gee MD on 3/25/2021 at 10:33 AM

## 2022-05-28 NOTE — PHYSICAL THERAPY NOTE
PHYSICAL THERAPY EVALUATION  NAME:  Hannah Whitlock  DATE: 05/28/22    AGE:   80 y o    Mrn:   [id number]  ADMIT DX:  Diarrhea [R19 7]  Problem List:   Patient Active Problem List   Diagnosis    Other specified hypothyroidism    Mild episode of recurrent major depressive disorder (HCC)    Idiopathic chronic 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    Primary osteoarthritis involving multiple joints    Super obesity    Chronic diastolic congestive heart failure (HCC)    Panniculitis    Gastroesophageal reflux disease without esophagitis    Lymphedema of extremity    Adjustment disorder    Ambulatory dysfunction    Anemia    Carpal tunnel syndrome    Stage 3a chronic kidney disease (HCC)    Depression with anxiety    Gout    Hx MRSA infection    Hyperlipidemia    Irritable bowel syndrome    Left atrial enlargement    Left ovarian cyst    Left ventricular hypertrophy    Osteoarthritis    Symptomatic menopausal or female climacteric states    C  difficile colitis    Diarrhea of presumed infectious origin    Chronic atrial fibrillation ([facility] )       Past Medical History  Past Medical History:   Diagnosis Date    Atrial fibrillation ([facility] )     Bladder stone     C  difficile colitis     Cellulitis     CHF (congestive heart failure) ([facility] )     Depression with anxiety     Disease of thyroid gland     Diverticulitis     Enterocolitis     History of abnormal cervical Pap smear     Kidney stone     Lymphedema     Menopause     Age 53    Morbid obesity with BMI of 70 and over, adult ([facility] )     MRSA (methicillin resistant Staphylococcus aureus)     abdominal wound    Osteoarthritis     Phlebitis     left lower leg    Spondylolysis        Past Surgical History  Past Surgical History:   Procedure Laterality Date    APPENDECTOMY      CARPAL TUNNEL RELEASE      CHOLECYSTECTOMY      CYSTOSCOPY      stent    FOOT SURGERY  1982    bone spur    KNEE SURGERY      WISDOM TOOTH EXTRACTION         Length Of Stay: 1  Performed at least 2 patient identifiers during session: Name Jess Santana       05/28/22 0757   PT Last Visit   PT Visit Date 05/28/22   Note Type   Note type Evaluation   Additional Comments VALARIE Bojorquez confirmed pt appropriate for PT evaluation   Pain Assessment   Pain Assessment Tool 0-10   Pain Score 8   Pain Location/Orientation Location: Generalized   Restrictions/Precautions   Weight Bearing Precautions Per Order No   Braces or Orthoses Other (Comment)  (none per patient)   Other Precautions Pain;Contact/isolation;Multiple lines   Home Living   Type of [address] Two level; Able to live on main level with bedroom/bathroom; Performs ADLs on one level; Other (Comment); Ramped entrance  (ramped entrance)   Bathroom Shower/Tub None   [employer]   (pt reports use of bedpain at baseline as she did not transfer OOB)   [address] Other (Comment)  (bed pan)   Bathroom Accessibility Other (Comment)  (pt uses bedpan and sponge bathes at baseline)   [address] [facility] bed; Other (Comment)  (pt reports no other equipment used  Stays in hospital bed with bilateral upper rails to assist with rolling)   Prior Function   Level of [place] Needs assistance with ADLs and functional mobility; Needs assistance with IADLs   Lives With Other (Comment)  (caregiver 12 hours/day)   Receives Help From Personal care attendant; Other (Comment)  (home care giver)   ADL Assistance Needs assistance   IADLs Needs assistance   Falls in the last 6 months 0   Comments Pt reports at baseline she had assistance from her caregivers who came 12 hours/day   Caregivers would assist with sponge bathing, dressing, toileting using bed pan, and assisting her in exercises including UE and LE TE   General   Additional Pertinent History Pt reports not standing/ambulating "it will be 3 years this July"   Family/Caregiver Present No   Cognition   Overall Cognitive Status WFL   Arousal/Participation Alert   Orientation Level Oriented X4   Memory Within functional limits   Following Commands Follows all commands and directions without difficulty   Subjective   Subjective "I'm still able to do all my exercises and move around like I do at home"   RLE Assessment   RLE Assessment X  (Overall AROM limited by soft tissue in supine position; however, pt able to use BLEs to assist in rolling with use of bed rails)   LLE Assessment   LLE Assessment X  (Overall AROM limited by soft tissue in supine position; however, pt able to use BLEs to assist in rolling with use of bed rails)   Vision-Basic Assessment   Current Vision Wears glasses all the time   Coordination   Sensation   (pt without complaints of numbness/tingling to BLEs)   Light Touch   RLE Light Touch Grossly intact   LLE Light Touch Grossly intact   Bed Mobility   Rolling R 3  Moderate assistance   Additional items Assist x 1;Bedrails;LE management; Increased time required   Additional Comments Did not test further mobility due to safety concerns as patient does not complete at baseline   Activity Tolerance   Activity Tolerance Patient tolerated treatment well   Assessment   Assessment Pt is 80 y o  female seen for high complexity PT evaluation s/p admit to [address] on 5/27/2022 w/ Panniculitis  PT consulted to assess pt's functional mobility and d/c needs  Order placed for PT eval and tx, w/ up and OOB as tolerated order  Comorbidities affecting pt's physical performance at time of assessment include: diarrhea of presumed infectious origin, chronic atrial fibrillation, gout, depression with anxiety, lymphedema of extremity, chronic diastolic congestive heart failure, obesity, panniculitis  PTA, pt was living in a two story home where she stays on the main level  Patient lives alone; however, has caregivers 12 hours/day that assist with sponge bathing, use of bed pain, and repositioning in bed  Patient reports she does lower and upper extremity exercises at home daily   Patient states she does not get out of bed at home and has not for three years  Personal factors affecting pt at time of IE include: anxiety and unable to perform physical activity  Please find objective findings from PT assessment regarding body systems outlined above  At time of evaluation, patient performed rolling modA of 1 with use of bed rail and patient able to verbalize instructions to PT on how she rolls at home  Patient with intact sensation to light touch to BLEs with ROM at BLEs limited by soft tissue in supine position  The following objective measures performed on IE: AM-PAC 6-Clicks: 1/16, low function basic mobility raw score:15  Pt's clinical presentation is currently unstable/unpredictable seen in pt's presentation of age, comorbidities, review of multiple systems during evaluation  From PT/mobility standpoint, pt appears to be functioning close to or at mobility baseline, therefore no further immediate skilled PT needs are warranted at this time  Pt currently performing all phases of functional mobility at baseline and able to make needs known; patient states "I'm still able to do all my exercises and move around like I do at home " Recommend pt return to previous living environment once medically cleared with previous caregiver support and assist at home  Will sign off, D/C PT  Please reconsult if further immediate skilled PT needs are warranted     Barriers to Discharge None   Goals   Patient Goals to go back home   PT Treatment Day 0   Plan   Treatment/Interventions   (PT to sign off)   PT Frequency Other (Comment)  (PT to sign off)   Recommendation   PT Discharge Recommendation No rehabilitation needs   AM-PAC Basic Mobility Inpatient   Turning in Bed Without Bedrails 2   Lying on Back to Sitting on Edge of Flat Bed 1   Moving Bed to Chair 1   Standing Up From Chair 1   Walk in Room 1   Climb 3-5 Stairs 1   Basic Mobility Inpatient Raw Score 7   Turning Head Towards Sound 4   Follow Simple Instructions 4   Low Function Basic Mobility Raw Score 15   Low Function Basic Mobility Standardized Score 23 9   Highest Level Of Mobility   -HL Goal 2: Bed activities/Dependent transfer   -HLM Achieved 2: Bed activities/Dependent transfer   End of Consult   Patient Position at End of Consult All needs within reach; Supine  (HOB elevated)       Time In: 3271  Time Out: 0808  Total Evaluation Minutes: [address], PT

## 2022-05-28 NOTE — H&P
T[facility] 46  H&P- Brandon Dalton 1950, 80 y o  female MRN: [id number]  Unit/Bed#: ED 02 Encounter: [id number]  Primary Care Provider: ORACIO Hart   Date and time admitted to hospital: 5/27/2022  7:52 PM    * Panniculitis  Assessment & Plan  · Recurrent cellulitis with drainage on her left hip more severe, has a small area on her the right pannus  · Patient recently hospitalized at [facility] for similar issues and was discharged home on doxycycline and Keflex to treat for 14 day antibiotic course - completed 5/20  · Check procalcitonin  · Follow-up blood culture  · Started on vancomycin and cefepime in the ED will continue vancomycin  · Serial exams  · Monitor labs    Diarrhea of presumed infectious origin  Assessment & Plan  · With continued diarrhea  · History of C diff - completed long course vanco PO and recently completed vanco on 05/24  · repeat testing pending  · Start oral vancomycin 125 mg q 6 hours  · Start probiotic    Chronic atrial fibrillation (HCC)  Assessment & Plan  · Continue Coumadin  · INR on 5/26 was 3 7 - will hold for tonight, check INR in am    Gout  Assessment & Plan  · Allopurinol    Depression with anxiety  Assessment & Plan  · Continue Cymbalta    Stage 3a chronic kidney disease [facility])  Assessment & Plan  Lab Results   Component Value Date    EGFR 55 05/27/2022    EGFR 63 05/11/2022    EGFR 53 05/10/2022    CREATININE 1 02 05/27/2022    CREATININE 0 92 05/11/2022    CREATININE 1 06 05/10/2022   · Creatinine stable    Lymphedema of extremity  Assessment & Plan  · IV Lasix    Chronic diastolic congestive heart failure (HCC)  Assessment & Plan  Wt Readings from Last 3 Encounters:   05/27/22 (!) 212 kg (467 lb)   05/08/22 (!) 212 kg (467 lb)   05/06/22 (!) 193 kg (425 lb)       · Diurese with IV Lasix      Super obesity  Assessment & Plan  · BMI greater than 80  · Healthy diet recommended  · Consider bariatric surgery are follow-up  · Nutrition services consult    Other specified hypothyroidism  Assessment & Plan  · Continue levothyroxine    VTE Pharmacologic Prophylaxis: VTE Score: 6 High Risk (Score >/= 5) - Pharmacological DVT Prophylaxis Ordered: warfarin (Coumadin)  Sequential Compression Devices Ordered  Code Status: Level 1 - Full Code   Discussion with patient    Anticipated Length of Stay: Patient will be admitted on an inpatient basis with an anticipated length of stay of greater than 2 midnights secondary to IV antibiotics, serial exams  Chief Complaint:  Returned redness on abdomen    History of Present Illness:  Rafael Melgoza is a 80 y o  female with a PMH of atrial fibrillation on Coumadin, diastolic CHF, super morbid obesity, MRSA infection, C diff colitis completed a prolonged vanco treatment, recent hospitalization for panniculitis with a 14 day course of antibiotics per who presents with return redness on her abdomen  She completed antibiotic on 5/20, redness started 2 days ago  She notes yellow drainage for weeks  She covers with a diaper for absorption  Has chills, no fever  Having diarrhea was 2-3 episodes increased to 4-5 but none here has not eaten today  Finished vanco on 5/24 and that's when stools changed for frequent  Review of Systems:  Review of Systems   Constitutional: Positive for chills  Negative for fever  HENT: Negative for rhinorrhea, sore throat and trouble swallowing  Eyes: Negative for discharge and redness  Respiratory: Negative for cough and shortness of breath  Cardiovascular: Positive for leg swelling  Negative for chest pain  Gastrointestinal: Positive for abdominal pain, diarrhea and nausea  Negative for vomiting  Genitourinary: Negative for dysuria and hematuria  Musculoskeletal: Positive for neck pain  Negative for back pain  Skin: Positive for color change and rash  Neurological: Positive for headaches  Negative for dizziness and weakness  Psychiatric/Behavioral: Negative for agitation and confusion  Past Medical and Surgical History:   Past Medical History:   Diagnosis Date    At[facility] fibrillation ([facility] )     Bladder stone     C  difficile colitis     Cellulitis     CHF (congestive heart failure) ([facility] )     Depression with anxiety     Disease of thyroid gland     Diverticulitis     Enterocolitis     History of abnormal cervical Pap smear     Kidney stone     Lymphedema     Menopause     Age 53    Morbid obesity with BMI of 70 and over, adult ([place] [address] )     MRSA (methicillin resistant Staphylococcus aureus)     abdominal wound    Osteoarthritis     Phlebitis     left lower leg    Spondylolysis        Past Surgical History:   Procedure Laterality Date    APPENDECTOMY      CARPAL TUNNEL RELEASE      CHOLECYSTECTOMY      CYSTOSCOPY      stent    FOOT SURGERY  1982    bone spur    KNEE SURGERY      WISDOM TOOTH EXTRACTION         Meds/Allergies:  Prior to Admission medications    Medication Sig Start Date End Date Taking?  Authorizing Provider   acetaminophen (TYLENOL) 650 mg CR tablet Take 650 mg by mouth every 8 (eight) hours    Historical Provider, MD   allopurinol (ZYLOPRIM) 100 mg tablet Take 1 tablet (100 mg total) by mouth daily 9/30/19   Marisela Johnson DO   DULoxetine (CYMBALTA) 20 mg capsule Take 1 capsule (20 mg total) by mouth daily 8/15/18   Marisela Johnson DO   furosemide (LASIX) 40 mg tablet Take 1 tablet (40 mg total) by mouth daily  Patient taking differently: Take 20 mg by mouth daily as needed Prn weight gain 10 pounds  8/23/19   Marisela Johnson DO   levothyroxine 125 mcg tablet Take 125 mcg by mouth daily    Historical Provider, MD   nystatin (MYCOSTATIN) powder Apply topically 2 (two) times a day 11/21/21   Tayler Hays MD   triamcinolone (KENALOG) 0 1 % cream Apply 0 1 application topically 2 (two) times a day 12/16/21   Historical Provider, MD   warfarin (COUMADIN) 2 mg tablet Take 1 tablet (2 mg total) by mouth every other day Monday, Wednesday, Friday, sunday 2/22/22   ORACIO Elizabeth   warfarin (COUMADIN) 5 mg tablet Take 1 tablet (5 mg total) by mouth every other day Tuesday, Thursday, saturday 2/22/22   [address]     I have reviewed home medications with patient personally  Allergies:    Allergies   Allergen Reactions    Augmentin Es-600  [Amoxicillin-Pot Clavulanate]     Penicillins Other (See Comments)     Tolerates cefepime (11/18/21)    Sulfa Antibiotics Hives    Vitamin C [Ascorbate - Food Allergy]     Latex Rash and Edema       Social History:  Marital Status: Single   Occupation:  Retired  Patient Pre-hospital Living Situation: Home  Patient Pre-hospital Level of Mobility: non-ambulatory/bed bound  Patient Pre-hospital Diet Restrictions:  None  Substance Use History:   Social History     Substance and Sexual Activity   Alcohol Use Not Currently     Social History     Tobacco Use   Smoking Status Former Smoker   Smokeless Tobacco Never Used   Tobacco Comment    5 packs/day until 6 (age 16-20) - As per [employer]      Social History     Substance and Sexual Activity   Drug Use Not Currently    Comment: As a teen - As per [employer]        Family History:  Family History   Problem Relation Age of Onset    Heart failure Mother     Heart disease Mother     Lymphoma Mother     Kidney disease Father     Heart disease Father     Heart failure Father     Diabetes type II Father     Diabetes type II Sister     Diabetes type II Brother     Uterine cancer Paternal Aunt     Breast cancer Neg Hx     Colon cancer Neg Hx     Ovarian cancer Neg Hx        Physical Exam:     Vitals:   Blood Pressure: 117/56 (05/27/22 2154)  Pulse: 77 (05/27/22 2154)  Temperature: 97 6 °F (36 4 °C) (05/27/22 1955)  Temp Source: Tympanic (05/27/22 1955)  Respirations: 18 (05/27/22 2154)  Height: 5' 2" (157 5 cm) (05/27/22 1955)  Weight - Scale: (!) 212 kg (467 lb) (05/27/22 1955)  SpO2: 98 % (05/27/22 2154)    Physical Exam  Vitals reviewed  Constitutional:       Appearance: Normal appearance  She is morbidly obese  HENT:      Head: Normocephalic and atraumatic  Nose: Nose normal    Eyes:      General:         Right eye: No discharge  Left eye: No discharge  Extraocular Movements: Extraocular movements intact  Conjunctiva/sclera: Conjunctivae normal       Comments: Glasses in place   Cardiovascular:      Rate and Rhythm: Normal rate and regular rhythm  Pulmonary:      Effort: Pulmonary effort is normal  No respiratory distress  Breath sounds: Normal breath sounds  No wheezing  Abdominal:      General: Bowel sounds are normal  There is no distension  Palpations: Abdomen is soft  Tenderness: There is no abdominal tenderness  There is no guarding  Musculoskeletal:         General: No swelling or tenderness  Normal range of motion  Cervical back: Normal range of motion  Right lower leg: No edema  Left lower leg: No edema  Skin:     General: Skin is warm and dry  Capillary Refill: Capillary refill takes less than 2 seconds  Comments: Left pannus/hip area with redness, warmth, drainage  Right pannus with the open area with drainage   Neurological:      General: No focal deficit present  Mental Status: She is alert and oriented to person, place, and time  Mental status is at baseline  Psychiatric:         Mood and Affect: Mood normal          Behavior: Behavior normal          Thought Content:  Thought content normal          Judgment: Judgment normal           Additional Data:     Lab Results:  Results from last 7 days   Lab Units 05/27/22  2042   WBC Thousand/uL 10 68*   HEMOGLOBIN g/dL 11 6   HEMATOCRIT % 38 6   PLATELETS Thousands/uL 459*   NEUTROS PCT % 80*   LYMPHS PCT % 11*   MONOS PCT % 6   EOS PCT % 2     Results from last 7 days   Lab Units 05/27/22  2153   SODIUM mmol/L 136   POTASSIUM mmol/L 3 7   CHLORIDE mmol/L 99   CO2 mmol/L 29   BUN mg/dL 25   CREATININE mg/dL 1 02   ANION GAP mmol/L 8   CALCIUM mg/dL 8 5   ALBUMIN g/dL 2 8*   TOTAL BILIRUBIN mg/dL 0 68   ALK PHOS U/L 109*   ALT U/L 9   AST U/L 16   GLUCOSE RANDOM mg/dL 131       ** Please Note: This note has been constructed using a voice recognition system   **

## 2022-05-28 NOTE — ED NOTES
Pt moved to ER room 2 onto bariatric bed  Pt oriented to call bell, no other needs at this time       Chau Mendez, [address] [facility]  05/27/22 2307

## 2022-05-28 NOTE — ASSESSMENT & PLAN NOTE
· With continued diarrhea  · History of C diff - repeat testing pending  · Start oral vancomycin 125 mg q 6 hours  · Start probiotic

## 2022-05-28 NOTE — ASSESSMENT & PLAN NOTE
Wt Readings from Last 3 Encounters:   05/27/22 (!) 212 kg (467 lb)   05/08/22 (!) 212 kg (467 lb)   05/06/22 (!) 193 kg (425 lb)       · Diurese with IV Lasix

## 2022-05-28 NOTE — PROGRESS NOTES
Vancomycin IV Pharmacy-to-Dose Consultation    Cristy Horton is a 80 y o  female who is currently receiving Vancomycin IV with management by the Pharmacy Consult service  Assessment/Plan:  The patient was reviewed  Renal function is stable and no signs or symptoms of nephrotoxicity and/or infusion reactions were documented in the chart  Based on todays assessment, continue current vancomycin (day # 2) dosing of 2000 q12, with a plan for trough to be drawn at 0830 on 5/29  We will continue to follow the patients culture results and clinical progress daily      Sanchez Posadas, Pharmacist

## 2022-05-28 NOTE — PROGRESS NOTES
[facility]  Progress Note - Heath Mace 1950, 80 y o  female MRN: [id number]  Unit/Bed#: ED 02 Encounter: [id number]  Primary Care Provider: ORACIO Galeas   Date and time admitted to hospital: 5/27/2022  7:52 PM    * Panniculitis  Assessment & Plan  · Recurrent cellulitis with drainage on her left hip more severe,also has an area on her the right pannus  · Patient recently hospitalized at [facility] for similar issues and was discharged home on doxycycline and Keflex to treat for 14 day antibiotic course - completed 5/20  · Procalcitonin 0 06, 0 06  · Follow-up blood culture  · Started on vancomycin and cefepime in the ED will continue vancomycin  · Serial exams  · Monitor labs    Diarrhea of presumed infectious origin  Assessment & Plan  · With continued diarrhea  · History of C diff - completed long course vanco PO and recently completed vanco on 05/24  · Repeat testing pending  · Start oral vancomycin 125 mg every 6 hours  · Continue probiotic    Chronic atrial fibrillation (HCC)  Assessment & Plan  · Continue Coumadin  · INR on 5/26 was 3 7 - Coumadin held   · INR today 3 97-hold Coumadin today, check INR tomorrow    Gout  Assessment & Plan  · Currently controlled  · Continue Allopurinol    Depression with anxiety  Assessment & Plan  · Currently controlled  · Continue Cymbalta    Stage 3a chronic kidney disease ([facility] [address] )  Assessment & Plan  · Creatinine stable, baseline around 1  · Daily metabolic panel and trend creatinine    Lymphedema of extremity  Assessment & Plan  · IV Lasix  · Serial assessments    Chronic diastolic congestive heart failure ([facility] [address] )  Assessment & Plan  Wt Readings from Last 3 Encounters:   05/27/22 (!) 212 kg (467 lb)   05/08/22 (!) 212 kg (467 lb)   05/06/22 (!) 193 kg (425 lb)     · Diurese with IV Lasix  · Monitor volume status      Super obesity  Assessment & Plan  · BMI greater than 80  · Healthy diet recommended  · Consider bariatric surgery are follow-up  · Nutrition services consult    Other specified hypothyroidism  Assessment & Plan  · Continue levothyroxine    VTE Pharmacologic Prophylaxis:   Pharmacologic: Warfarin (Coumadin)  Mechanical VTE Prophylaxis in Place:  Ordered    Patient Centered Rounds: I have performed bedside rounds with nursing staff today  Education and Discussions with Family / Patient:  Patient    Time Spent for Care: 30 minutes  More than 50% of total time spent on counseling and coordination of care as described above  Current Length of Stay: 1 day(s)    Current Patient Status: Inpatient   Certification Statement: The patient will continue to require additional inpatient hospital stay due to per plan above  Discharge Plan: To be determined    Code Status: Level 1 - Full Code      Subjective:   Patient seen and examined  Says that she continues with diarrhea  Denies pain to areas of panniculitis  No complaints offered  Objective:     Vitals:   Temp (24hrs), Av 6 °F (36 4 °C), Min:97 6 °F (36 4 °C), Max:97 6 °F (36 4 °C)    Temp:  [97 6 °F (36 4 °C)] 97 6 °F (36 4 °C)  HR:  [62-77] 62  Resp:  [16-18] 16  BP: ()/(56-65) 100/57  SpO2:  [97 %-98 %] 97 %  Body mass index is 85 42 kg/m²  Input and Output Summary (last 24 hours): Intake/Output Summary (Last 24 hours) at 2022 0853  Last data filed at 2022 0030  Gross per 24 hour   Intake 550 ml   Output --   Net 550 ml       Physical Exam:     Physical Exam  Vitals and nursing note reviewed  Constitutional:       General: She is not in acute distress  Appearance: She is obese  HENT:      Head: Normocephalic and atraumatic  Mouth/Throat:      Mouth: Mucous membranes are moist       Pharynx: Oropharynx is clear  Eyes:      Pupils: Pupils are equal, round, and reactive to light  Cardiovascular:      Rate and Rhythm: Normal rate and regular rhythm     Pulmonary:      Effort: Pulmonary effort is normal  No respiratory distress  Breath sounds: Decreased breath sounds present  Abdominal:      General: Bowel sounds are normal       Palpations: Abdomen is soft  Tenderness: There is no abdominal tenderness  Musculoskeletal:      Cervical back: Neck supple  Right lower leg: Edema present  Left lower leg: Edema present  Skin:     General: Skin is warm and dry  Capillary Refill: Capillary refill takes less than 2 seconds  Findings: Erythema present  Comments: Erythema and swelling to right pannus  Erythema and drainage to left pannus  Neurological:      General: No focal deficit present  Mental Status: She is alert  Additional Data:     Labs:    Results from last 7 days   Lab Units 05/28/22  0608 05/27/22  2042   WBC Thousand/uL 9 37 10 68*   HEMOGLOBIN g/dL 10 6* 11 6   HEMATOCRIT % 36 1 38 6   PLATELETS Thousands/uL 390 459*   NEUTROS PCT %  --  80*   LYMPHS PCT %  --  11*   MONOS PCT %  --  6   EOS PCT %  --  2     Results from last 7 days   Lab Units 05/28/22  0608 05/27/22  2153   SODIUM mmol/L 136 136   POTASSIUM mmol/L 3 7 3 7   CHLORIDE mmol/L 103 99   CO2 mmol/L 29 29   BUN mg/dL 23 25   CREATININE mg/dL 0 95 1 02   ANION GAP mmol/L 4 8   CALCIUM mg/dL 8 5 8 5   ALBUMIN g/dL  --  2 8*   TOTAL BILIRUBIN mg/dL  --  0 68   ALK PHOS U/L  --  109*   ALT U/L  --  9   AST U/L  --  16   GLUCOSE RANDOM mg/dL 114 131     Results from last 7 days   Lab Units 05/28/22  0608   INR  3 97*             Results from last 7 days   Lab Units 05/28/22  0608 05/27/22  2153   PROCALCITONIN ng/ml 0 06 0 06           * I Have Reviewed All Lab Data Listed Above  * Additional Pertinent Lab Tests Reviewed:  [facility]      Recent Cultures (last 7 days):           Last 24 Hours Medication List:   Current Facility-Administered Medications   Medication Dose Route Frequency Provider Last Rate    acetaminophen  650 mg Oral Q4H PRN Margot Davila MYA      allopurinol  100 mg Oral Daily [place], [place]      DULoxetine  20 mg Oral Daily [place], [place]      enoxaparin  60 mg Subcutaneous BID [place], [place]      furosemide  40 mg Intravenous BID (diuretic) Nirali Waddell PA-C      levothyroxine  125 mcg Oral Daily [place], [place]      ondansetron  4 mg Intravenous Q6H PRN Nirali Waddell PA-C      saccharomyces boulardii  250 mg Oral BID [place], [place]      triamcinolone   Topical BID [place], [place]      vancomycin  2,000 mg Intravenous Q12H [facility] (05/28/22 0826)    vancomycin  125 mg Oral HOSP [facility] [place], [place]      [START ON 5/29/2022] warfarin  2 mg Oral Once per day on Sun Mon Wed Fri Nirali Waddell PA-C      [START ON 5/30/2022] warfarin  4 mg Oral Once per day on Tue Thu Sat ORACIO Paul          Today, Patient Was Seen By: ORACIO Paul    ** Please Note: Dictation voice to text software may have been used in the creation of this document   **

## 2022-05-28 NOTE — ASSESSMENT & PLAN NOTE
· With continued diarrhea  · History of C diff - completed long course vanco PO and recently completed vanco on 05/24  · Repeat testing pending  · Start oral vancomycin 125 mg every 6 hours  · Continue probiotic

## 2022-05-28 NOTE — ASSESSMENT & PLAN NOTE
Wt Readings from Last 3 Encounters:   05/27/22 (!) 212 kg (467 lb)   05/08/22 (!) 212 kg (467 lb)   05/06/22 (!) 193 kg (425 lb)     · Diurese with IV Lasix  · Monitor volume status

## 2022-05-28 NOTE — ASSESSMENT & PLAN NOTE
· Recurrent cellulitis with drainage on her left hip more severe, has a small area on her the right pannus  · Patient recently hospitalized at [facility] for similar issues and was discharged home on doxycycline and Keflex to treat for 14 day antibiotic course - completed 5/20  · Check procalcitonin  · Follow-up blood culture  · Started on vancomycin and cefepime in the ED will continue vancomycin  · Serial exams  · Monitor labs

## 2022-05-28 NOTE — PROGRESS NOTES
Vancomycin Assessment    Heath Mace is a 80 y o  female who is currently receiving vancomycin 1750mg IV q12h for skin-soft tissue infection     Relevant clinical data and objective history reviewed:  Creatinine   Date Value Ref Range Status   05/27/2022 1 02 0 60 - 1 30 mg/dL Final     Comment:     Standardized to IDMS reference method   05/11/2022 0 92 0 60 - 1 30 mg/dL Final     Comment:     Standardized to IDMS reference method   05/10/2022 1 06 0 60 - 1 30 mg/dL Final     Comment:     Standardized to IDMS reference method   05/19/2018 1 03 0 6 - 1 2 MG/DL Final     Comment:     IDMS method performed  The drugs Metamizole, Sulfasalazine, and Sulfapyridine may interfere and  result in false low results  Vancomycin Rm   Date Value Ref Range Status   05/12/2022 20 5 (H) 10 0 - 20 0 ug/mL Final     /56   Pulse 77   Temp 97 6 °F (36 4 °C) (Tympanic)   Resp 18   Ht 5' 2" (1 575 m)   Wt (!) 212 kg (467 lb)   LMP  (LMP Unknown)   SpO2 98%   BMI 85 42 kg/m²   No intake/output data recorded  Lab Results   Component Value Date/Time    BUN 25 05/27/2022 09:53 PM    BUN 33 (H) 05/19/2018 06:15 PM    WBC 10 68 (H) 05/27/2022 08:42 PM    WBC 9 1 05/19/2018 06:15 PM    HGB 11 6 05/27/2022 08:42 PM    HGB 12 3 05/19/2018 06:15 PM    HCT 38 6 05/27/2022 08:42 PM    HCT 38 5 05/19/2018 06:15 PM    MCV 80 (L) 05/27/2022 08:42 PM    MCV 93 3 05/19/2018 06:15 PM     (H) 05/27/2022 08:42 PM     05/19/2018 06:15 PM     Temp Readings from Last 3 Encounters:   05/27/22 97 6 °F (36 4 °C) (Tympanic)   05/13/22 97 7 °F (36 5 °C)   05/06/22 (!) 97 3 °F (36 3 °C)     Vancomycin Days of Therapy: 1    Assessment/Plan  The patient is currently on vancomycin utilizing scheduled dosing based on adjusted body weight (due to obesity)  Baseline risks associated with therapy include: concomitant nephrotoxic medications, advanced age, and dehydration  The patient is currently receiving 1750mg IV q12h and after clinical evaluation will be changed to 2000mg IV q12h  Pharmacy will also follow closely for s/sx of nephrotoxicity, infusion reactions, and appropriateness of therapy  BMP and CBC will be ordered per protocol  Plan for trough as patient approaches steady state, prior to the 4th  dose at approximately 0830 on 5/29/22  Due to infection severity, will target a trough of 15-20 (appropriate for most indications)   Pharmacy will continue to follow the patients culture results and clinical progress daily      Josafat Howell, Pharmacist

## 2022-05-28 NOTE — ASSESSMENT & PLAN NOTE
· Recurrent cellulitis with drainage on her left hip more severe,also has an area on her the right pannus  · Patient recently hospitalized at [facility] for similar issues and was discharged home on doxycycline and Keflex to treat for 14 day antibiotic course - completed 5/20  · Procalcitonin 0 06, 0 06  · Follow-up blood culture  · Started on vancomycin and cefepime in the ED will continue vancomycin  · Serial exams  · Monitor labs

## 2022-05-28 NOTE — ASSESSMENT & PLAN NOTE
· With continued diarrhea  · History of C diff - completed long course vanco PO and recently completed vanco on 05/24  · repeat testing pending  · Start oral vancomycin 125 mg q 6 hours  · Start probiotic

## 2022-05-28 NOTE — ASSESSMENT & PLAN NOTE
· BMI greater than 80  · Healthy diet recommended  · Consider bariatric surgery are follow-up  · Nutrition services consult

## 2022-05-29 LAB
ANION GAP SERPL CALCULATED.3IONS-SCNC: 8 MMOL/L (ref 4–13)
BASOPHILS # BLD AUTO: 0.05 THOUSANDS/ΜL (ref 0–0.1)
BASOPHILS NFR BLD AUTO: 1 % (ref 0–1)
BUN SERPL-MCNC: 22 MG/DL (ref 5–25)
CALCIUM SERPL-MCNC: 8.8 MG/DL (ref 8.4–10.2)
CHLORIDE SERPL-SCNC: 100 MMOL/L (ref 96–108)
CO2 SERPL-SCNC: 29 MMOL/L (ref 21–32)
CREAT SERPL-MCNC: 0.95 MG/DL (ref 0.6–1.3)
EOSINOPHIL # BLD AUTO: 0.22 THOUSAND/ΜL (ref 0–0.61)
EOSINOPHIL NFR BLD AUTO: 3 % (ref 0–6)
ERYTHROCYTE [DISTWIDTH] IN BLOOD BY AUTOMATED COUNT: 16.8 % (ref 11.6–15.1)
GFR SERPL CREATININE-BSD FRML MDRD: 60 ML/MIN/1.73SQ M
GLUCOSE SERPL-MCNC: 113 MG/DL (ref 65–140)
HCT VFR BLD AUTO: 36.9 % (ref 34.8–46.1)
HGB BLD-MCNC: 10.5 G/DL (ref 11.5–15.4)
IMM GRANULOCYTES # BLD AUTO: 0.03 THOUSAND/UL (ref 0–0.2)
IMM GRANULOCYTES NFR BLD AUTO: 0 % (ref 0–2)
INR PPP: 3.91 (ref 0.84–1.19)
LYMPHOCYTES # BLD AUTO: 0.94 THOUSANDS/ΜL (ref 0.6–4.47)
LYMPHOCYTES NFR BLD AUTO: 13 % (ref 14–44)
MCH RBC QN AUTO: 24.4 PG (ref 26.8–34.3)
MCHC RBC AUTO-ENTMCNC: 28.5 G/DL (ref 31.4–37.4)
MCV RBC AUTO: 86 FL (ref 82–98)
MONOCYTES # BLD AUTO: 0.46 THOUSAND/ΜL (ref 0.17–1.22)
MONOCYTES NFR BLD AUTO: 6 % (ref 4–12)
NEUTROPHILS # BLD AUTO: 5.7 THOUSANDS/ΜL (ref 1.85–7.62)
NEUTS SEG NFR BLD AUTO: 77 % (ref 43–75)
NRBC BLD AUTO-RTO: 0 /100 WBCS
PLATELET # BLD AUTO: 368 THOUSANDS/UL (ref 149–390)
PMV BLD AUTO: 9.5 FL (ref 8.9–12.7)
POTASSIUM SERPL-SCNC: 4.3 MMOL/L (ref 3.5–5.3)
PROTHROMBIN TIME: 37.1 SECONDS (ref 11.6–14.5)
RBC # BLD AUTO: 4.31 MILLION/UL (ref 3.81–5.12)
SODIUM SERPL-SCNC: 137 MMOL/L (ref 135–147)
WBC # BLD AUTO: 7.4 THOUSAND/UL (ref 4.31–10.16)

## 2022-05-29 PROCEDURE — 85610 PROTHROMBIN TIME: CPT | Performed by: NURSE PRACTITIONER

## 2022-05-29 PROCEDURE — 85025 COMPLETE CBC W/AUTO DIFF WBC: CPT | Performed by: NURSE PRACTITIONER

## 2022-05-29 PROCEDURE — 99233 SBSQ HOSP IP/OBS HIGH 50: CPT | Performed by: NURSE PRACTITIONER

## 2022-05-29 PROCEDURE — 80048 BASIC METABOLIC PNL TOTAL CA: CPT | Performed by: NURSE PRACTITIONER

## 2022-05-29 RX ORDER — WARFARIN SODIUM 2 MG/1
2 TABLET ORAL
Status: DISCONTINUED | OUTPATIENT
Start: 2022-05-30 | End: 2022-05-29

## 2022-05-29 RX ADMIN — Medication 125 MG: at 17:29

## 2022-05-29 RX ADMIN — Medication 125 MG: at 12:05

## 2022-05-29 RX ADMIN — ACETAMINOPHEN 650 MG: 325 TABLET ORAL at 07:37

## 2022-05-29 RX ADMIN — ALLOPURINOL 100 MG: 100 TABLET ORAL at 09:21

## 2022-05-29 RX ADMIN — VANCOMYCIN HYDROCHLORIDE 2000 MG: 1 INJECTION, POWDER, LYOPHILIZED, FOR SOLUTION INTRAVENOUS at 21:56

## 2022-05-29 RX ADMIN — Medication 125 MG: at 05:37

## 2022-05-29 RX ADMIN — DULOXETINE 20 MG: 20 CAPSULE, DELAYED RELEASE ORAL at 09:20

## 2022-05-29 RX ADMIN — FUROSEMIDE 40 MG: 10 INJECTION, SOLUTION INTRAMUSCULAR; INTRAVENOUS at 07:37

## 2022-05-29 RX ADMIN — LEVOTHYROXINE SODIUM 125 MCG: 25 TABLET ORAL at 09:20

## 2022-05-29 RX ADMIN — ACETAMINOPHEN 650 MG: 325 TABLET ORAL at 02:26

## 2022-05-29 RX ADMIN — ENOXAPARIN SODIUM 60 MG: 100 INJECTION SUBCUTANEOUS at 17:27

## 2022-05-29 RX ADMIN — FUROSEMIDE 40 MG: 10 INJECTION, SOLUTION INTRAMUSCULAR; INTRAVENOUS at 17:27

## 2022-05-29 RX ADMIN — VANCOMYCIN HYDROCHLORIDE 2000 MG: 1 INJECTION, POWDER, LYOPHILIZED, FOR SOLUTION INTRAVENOUS at 09:21

## 2022-05-29 RX ADMIN — Medication 125 MG: at 00:01

## 2022-05-29 RX ADMIN — Medication 250 MG: at 09:20

## 2022-05-29 RX ADMIN — TRIAMCINOLONE ACETONIDE: 1 CREAM TOPICAL at 09:21

## 2022-05-29 RX ADMIN — ENOXAPARIN SODIUM 60 MG: 100 INJECTION SUBCUTANEOUS at 09:21

## 2022-05-29 RX ADMIN — Medication 250 MG: at 17:27

## 2022-05-29 NOTE — WOUND OSTOMY CARE
Consult Note - Wound   Cristy Horton 80 y o  female MRN: [id number]  Unit/Bed#: -01 Encounter: [id number]      History and Present Illness: This is a 80year old female patient admitted on 5/27/2022 with cellulitis  She has a history of morbid obesity, recurrent cellulitis, C-diff colitis, AFIB, CHF and hypothyroidism  She was awake, alert & oriented x 3  The patient can be repositioned in bed with assist of 2 and is dependent upon nursing staff or caretaker for all of her needs  Patient on [facility] bariatric bed  Assessment Findings:   She was awake, alert & oriented x 4  The patient can be repositioned in bed with assist of 2 and is dependent upon nursing staff or caretaker for all of her needs  She is able to feed herself  Allevyn heel foams in place for prevention  Per primary RN and patient buttocks and sacrum intact  Skin and Wound Care Plan:   1  Cleanse bilateral skin folds to abdomen and inguinal areas with foaming cleanser & pat dry well  Apply small amount of Kenalog cream to wound beds and place ABD pad to each skin fold  Change daily & prn soilage/dislodgement  2  Apply hydraguard to b/l heels, buttocks and sacrum BID and PRN for prevention and protection  3  Apply skin nourishing cream the entire skin daily for moisture  4  Turn and reposition patient every  2 hours   5  Elevate heels off of bed with pillows to offload pressure   6  Apply EHOB waffle cushion to chair when OOB, if able  7  The patient has a bariatric bed with low air-loss, pressure redistribution mattress in place    Wounds:  Wound 05/28/22 Cellulitis Hip Anterior;Left;Proximal (Active)   Wound Image   05/29/22 1255   Wound Description Beefy red;Drainage;Fragile; Swelling 05/29/22 1255   Alison-wound Assessment Edema; Erythema 05/29/22 1255   Drainage Amount Moderate 05/29/22 1255   Drainage Description Yellow;Serous 05/29/22 1255   Treatments Cleansed 05/29/22 1255   Dressing Other (Comment) 05/29/22 1255   Dressing Changed Changed 05/29/22 1255   Patient Tolerance Tolerated well 05/29/22 1255       Wound 05/28/22 Cellulitis Abdomen Right; Lower (Active)   Wound Image    05/29/22 1248   Wound Description Beefy red 05/29/22 1248   Alison-wound Assessment Fragile;Pink 05/29/22 1248   Wound Length (cm) 0 4 cm 05/29/22 1248   Wound Width (cm) 48 cm 05/29/22 1248   Wound Depth (cm) 0 1 cm 05/29/22 1248   Wound Surface Area (cm^2) 19 2 cm^2 05/29/22 1248   Wound Volume (cm^3) 1 92 cm^3 05/29/22 1248   Calculated Wound Volume (cm^3) 1 92 cm^3 05/29/22 1248   Drainage Amount Small 05/29/22 1248   Drainage Description Bloody 05/29/22 1248   Non-staged Wound Description Partial thickness 05/29/22 1248   Treatments Cleansed 05/29/22 1248   Dressing ABD 05/29/22 1248   Dressing Changed Changed 05/29/22 1248   Patient Tolerance Tolerated well 05/29/22 1248     Reviewed plan of care with primary RN Alisson Laguna  Recommendations written as orders  Wound care team to follow weekly while admitted  Questions or concerns 1234 [facility] Nurse    Janna Rebollar BSN, RN, [employer]

## 2022-05-29 NOTE — ASSESSMENT & PLAN NOTE
· Rate controlled, does not appear to be on rate controlling meds  · Continue Coumadin  · INR on 5/26 was 3 7 - Coumadin held   · INR today 3 91-Coumadin held  · She takes 2 mg of Coumadin Sunday Monday Wednesday Friday, and 4 mg of Coumadin Tuesday Thursday and Saturday-reorder as appropriate  · Daily PT INR

## 2022-05-29 NOTE — PROGRESS NOTES
Vancomycin IV Pharmacy-to-Dose Consultation    Fili Shine is a 80 y o  female who is currently receiving Vancomycin IV with management by the Pharmacy Consult service  Assessment/Plan:  The patient was reviewed  Renal function is stable and no signs or symptoms of nephrotoxicity and/or infusion reactions were documented in the chart  Based on todays assessment, continue current vancomycin (day # 3) dosing of 2g q12, with a plan for trough to be drawn at 0830 on 5/30  We will continue to follow the patients culture results and clinical progress daily      Srinivasan Bhagat, Pharmacist

## 2022-05-29 NOTE — PLAN OF CARE
Problem: Potential for Falls  Goal: Patient will remain free of falls  Description: INTERVENTIONS:  - Educate patient/family on patient safety including physical limitations  - Instruct patient to call for assistance with activity   - Consult OT/PT to assist with strengthening/mobility   - Keep Call bell within reach  - Keep bed low and locked with side rails adjusted as appropriate  - Keep care items and personal belongings within reach  - Initiate and maintain comfort rounds  - Make Fall Risk Sign visible to staff  - Offer Toileting every 1  Hours, in advance of need  - Initiate/Maintain bed alarm  - Obtain necessary fall risk management equipment: bed   - Apply yellow socks and bracelet for high fall risk patients  - Consider moving patient to room near nurses station  Outcome: Progressing     Problem: PAIN - ADULT  Goal: Verbalizes/displays adequate comfort level or baseline comfort level  Description: Interventions:  - Encourage patient to monitor pain and request assistance  - Assess pain using appropriate pain scale  - Administer analgesics based on type and severity of pain and evaluate response  - Implement non-pharmacological measures as appropriate and evaluate response  - Consider cultural and social influences on pain and pain management  - Notify physician/advanced practitioner if interventions unsuccessful or patient reports new pain  Outcome: Progressing     Problem: INFECTION - ADULT  Goal: Absence or prevention of progression during hospitalization  Description: INTERVENTIONS:  - Assess and monitor for signs and symptoms of infection  - Monitor lab/diagnostic results  - Monitor all insertion sites, i e  indwelling lines, tubes, and drains  - Monitor endotracheal if appropriate and nasal secretions for changes in amount and color  - [place] appropriate cooling/warming therapies per order  - Administer medications as ordered  - Instruct and encourage patient and family to use good hand hygiene technique  - Identify and instruct in appropriate isolation precautions for identified infection/condition  Outcome: Progressing  Goal: Absence of fever/infection during neutropenic period  Description: INTERVENTIONS:  - Monitor WBC    Outcome: Progressing     Problem: SAFETY ADULT  Goal: Maintain or return to baseline ADL function  Description: INTERVENTIONS:  -  Assess patient's ability to carry out ADLs; assess patient's baseline for ADL function and identify physical deficits which impact ability to perform ADLs (bathing, care of mouth/teeth, toileting, grooming, dressing, etc )  - Assess/evaluate cause of self-care deficits   - Assess range of motion  - Assess patient's mobility; develop plan if impaired  - Assess patient's need for assistive devices and provide as appropriate  - Encourage maximum independence but intervene and supervise when necessary  - Involve family in performance of ADLs  - Assess for home care needs following discharge   - Consider OT consult to assist with ADL evaluation and planning for discharge  - Provide patient education as appropriate  Outcome: Progressing  Goal: Maintains/Returns to pre admission functional level  Description: INTERVENTIONS:  - Perform BMAT or MOVE assessment daily    - Set and communicate daily mobility goal to care team and patient/family/caregiver  - Collaborate with rehabilitation services on mobility goals if consulted  - Perform Range of Motion 3 times a day  - Reposition patient every 2 hours    - Dangle patient 3 times a day  - Stand patient 3 times a day  - Ambulate patient 3 times a day  - Out of bed to chair 3 times a day   - Out of bed for meals 3 times a day  - Out of bed for toileting  - Record patient progress and toleration of activity level   Outcome: Progressing     Problem: DISCHARGE PLANNING  Goal: Discharge to home or other facility with appropriate resources  Description: INTERVENTIONS:  - Identify barriers to discharge w/patient and caregiver  - Arrange for needed discharge resources and transportation as appropriate  - Identify discharge learning needs (meds, wound care, etc )  - Arrange for interpretive services to assist at discharge as needed  - Refer to Case Management Department for coordinating discharge planning if the patient needs post-hospital services based on physician/advanced practitioner order or complex needs related to functional status, cognitive ability, or social support system  Outcome: Progressing     Problem: Knowledge Deficit  Goal: Patient/family/caregiver demonstrates understanding of disease process, treatment plan, medications, and discharge instructions  Description: Complete learning assessment and assess knowledge base    Interventions:  - Provide teaching at level of understanding  - Provide teaching via preferred learning methods  Outcome: Progressing     Problem: SKIN/TISSUE INTEGRITY - ADULT  Goal: Skin Integrity remains intact(Skin Breakdown Prevention)  Description: Assess:  -Perform Gucci assessment every shift -Clean and moisturize skin every shift  -Inspect skin when repositioning, toileting, and assisting with ADLS  -Assess under medical devices such as allevyn every shift and prn  -Assess extremities for adequate circulation and sensation     Bed Management:  -Have minimal linens on bed & keep smooth, unwrinkled  -Change linens as needed when moist or perspiring  -Avoid sitting or lying in one position for more than 2 hours while in bed  -Keep HOB at 30degrees     Toileting:  -Offer bedside commode  -Assess for incontinence every hour  -Use incontinent care products after each incontinent episode such as hydraguard    Activity:  -Mobilize patient  times a day  -Encourage activity and walks on unit  -Encourage or provide ROM exercises   -Turn and reposition patient every 2 Hours  -Use appropriate equipment to lift or move patient in bed  -Instruct/ Assist with weight shifting every 15 when out of bed in chair  -Consider limitation of chair time 4 hour intervals    Skin Care:  -Avoid use of baby powder, tape, friction and shearing, hot water or constrictive clothing  -Relieve pressure over bony prominences using allevyn  -Do not massage red bony areas    Next Steps:  -Teach patient strategies to minimize risks such as change position   -Consider consults to  interdisciplinary teams such as wound  Outcome: Progressing  Goal: Incision(s), wounds(s) or drain site(s) healing without S/S of infection  Description: INTERVENTIONS  - Assess and document dressing, incision, wound bed, drain sites and surrounding tissue  - Provide patient and family education  - Perform skin care/dressing changes every shift and prn  Outcome: Progressing  Goal: Pressure injury heals and does not worsen  Description: Interventions:  - Implement low air loss mattress or specialty surface (Criteria met)  - Apply silicone foam dressing  - Instruct/assist with weight shifting every 15 minutes when in chair   - Limit chair time to 4 hour intervals  - Use special pressure reducing interventions such as  when in chair   - Apply fecal or urinary incontinence containment device   - Perform passive or active ROM every   - Turn and reposition patient & offload bony prominences every  hours   - Utilize friction reducing device or surface for transfers   - Consider consults to  interdisciplinary teams such as   - Use incontinent care products after each incontinent episode such as   - Consider nutrition services referral as needed  Outcome: Progressing     Problem: MOBILITY - ADULT  Goal: Maintain or return to baseline ADL function  Description: INTERVENTIONS:  -  Assess patient's ability to carry out ADLs; assess patient's baseline for ADL function and identify physical deficits which impact ability to perform ADLs (bathing, care of mouth/teeth, toileting, grooming, dressing, etc )  - Assess/evaluate cause of self-care deficits   - Assess range of motion  - Assess patient's mobility; develop plan if impaired  - Assess patient's need for assistive devices and provide as appropriate  - Encourage maximum independence but intervene and supervise when necessary  - Involve family in performance of ADLs  - Assess for home care needs following discharge   - Consider OT consult to assist with ADL evaluation and planning for discharge  - Provide patient education as appropriate  Outcome: Progressing  Goal: Maintains/Returns to pre admission functional level  Description: INTERVENTIONS:  - Perform BMAT or MOVE assessment daily    - Set and communicate daily mobility goal to care team and patient/family/caregiver  - Collaborate with rehabilitation services on mobility goals if consulted  - Perform Range of Motion 3 times a day  - Reposition patient every 2 hours    - Dangle patient 3 times a day  - Stand patient 3 times a day  - Ambulate patient 3 times a day  - Out of bed to chair 3 times a day   - Out of bed for meals 3 times a day  - Out of bed for toileting  - Record patient progress and toleration of activity level   Outcome: Progressing     Problem: Prexisting or High Potential for Compromised Skin Integrity  Goal: Skin integrity is maintained or improved  Description: INTERVENTIONS:  - Identify patients at risk for skin breakdown  - Assess and monitor skin integrity  - Assess and monitor nutrition and hydration status  - Monitor labs   - Assess for incontinence   - Turn and reposition patient  - Assist with mobility/ambulation  - Relieve pressure over bony prominences  - Avoid friction and shearing  - Provide appropriate hygiene as needed including keeping skin clean and dry  - Evaluate need for skin moisturizer/barrier cream  - Collaborate with interdisciplinary team   - Patient/family teaching  - Consider wound care consult   Outcome: Progressing

## 2022-05-29 NOTE — ASSESSMENT & PLAN NOTE
· Presented for continue diarrhea-has not had a bowel movement 36 hours  · History of C diff - completed long course vanco PO and recently completed vanco on 05/24  · Repeat testing unable to be collected at this point-and nursing had asked that the order be discontinued as it is 50 hours old    Consider reordering if indicated  · Start oral vancomycin 125 mg every 6 hours  · Continue probiotic

## 2022-05-29 NOTE — PROGRESS NOTES
Toby 46  Progress Note - Velasquez De La Rosa 1950, 80 y o  female MRN: [id number]  Unit/Bed#: -01 Encounter: [id number]  Primary Care Provider: ORACIO Sandoval   Date and time admitted to hospital: 5/27/2022  7:52 PM    * Panniculitis  Assessment & Plan  · Recurrent cellulitis with drainage on her left hip more severe,also has an area on her the right pannus  · Patient recently hospitalized at [facility] for similar issues and was discharged home on doxycycline and Keflex to treat for 14 day antibiotic course - completed 5/20  · Procalcitonin 0 06, 0 06  · Follow-up blood culture  · Started on vancomycin and cefepime in the ED-will continue vancomycin  · Serial exams  · Monitor labs    Diarrhea of presumed infectious origin  Assessment & Plan  · Presented for continue diarrhea-has not had a bowel movement 36 hours  · History of C diff - completed long course vanco PO and recently completed vanco on 05/24  · Repeat testing unable to be collected at this point-and nursing had asked that the order be discontinued as it is 50 hours old    Consider reordering if indicated  · Start oral vancomycin 125 mg every 6 hours  · Continue probiotic    Chronic atrial fibrillation (HCC)  Assessment & Plan  · Rate controlled, does not appear to be on rate controlling meds  · Continue Coumadin  · INR on 5/26 was 3 7 - Coumadin held   · INR today 3 91-Coumadin held  · She takes 2 mg of Coumadin Sunday Monday Wednesday Friday, and 4 mg of Coumadin Tuesday Thursday and Saturday-reorder as appropriate  · Daily PT INR    Gout  Assessment & Plan  · Currently controlled  · Continue Allopurinol    Depression with anxiety  Assessment & Plan  · Currently controlled  · Continue Cymbalta    Stage 3a chronic kidney disease ([facility] [address] )  Assessment & Plan  · Creatinine stable, baseline around 1  · Daily metabolic panel and trend creatinine    Lymphedema of extremity  Assessment & Plan  · IV Lasix  · Serial assessments    Chronic diastolic congestive heart failure [facility])  Assessment & Plan  Wt Readings from Last 3 Encounters:   22 (!) 212 kg (467 lb)   22 (!) 212 kg (467 lb)   22 (!) 193 kg (425 lb)     · Diurese with IV Lasix  · Monitor volume status      Super obesity  Assessment & Plan  · BMI greater than 80  · Healthy diet recommended  · Consider bariatric surgery are follow-up  · Nutrition services consult    Other specified hypothyroidism  Assessment & Plan  · Continue levothyroxine    VTE Pharmacologic Prophylaxis:   Pharmacologic: Enoxaparin (Lovenox)  Mechanical VTE Prophylaxis in Place: Yes    Patient Centered Rounds: I have performed bedside rounds with nursing staff today  Discussions with Specialists or Other Care Team Provider: RN    Education and Discussions with Family / Patient: Patient  She declined family phone call  Time Spent for Care: 30 minutes  More than 50% of total time spent on counseling and coordination of care as described above  Current Length of Stay: 2 day(s)    Current Patient Status: Inpatient   Certification Statement: The patient will continue to require additional inpatient hospital stay due to per plan above  Discharge Plan: To be determined  Code Status: Level 1 - Full Code      Subjective:   Patient seen and examined  No specific complaints offered, she just reports that her left pannus continues to drain  No complaints of pain, no diarrhea  Objective:     Vitals:   Temp (24hrs), Av 8 °F (36 6 °C), Min:97 5 °F (36 4 °C), Max:98 2 °F (36 8 °C)    Temp:  [97 5 °F (36 4 °C)-98 2 °F (36 8 °C)] 97 5 °F (36 4 °C)  HR:  [63-71] 63  Resp:  [18] 18  BP: (107-115)/(58-62) 115/58  SpO2:  [95 %-97 %] 95 %  Body mass index is 85 42 kg/m²  Input and Output Summary (last 24 hours):     No intake or output data in the 24 hours ending 22 [id number]    Physical Exam:     Physical Exam  Vitals and nursing note reviewed  Constitutional:       Appearance: She is obese  HENT:      Head: Normocephalic and atraumatic  Mouth/Throat:      Pharynx: Oropharynx is clear  Eyes:      Pupils: Pupils are equal, round, and reactive to light  Cardiovascular:      Rate and Rhythm: Normal rate  Pulmonary:      Effort: Pulmonary effort is normal  No respiratory distress  Breath sounds: Normal breath sounds  Abdominal:      General: Bowel sounds are normal       Palpations: Abdomen is soft  Tenderness: There is no abdominal tenderness  Musculoskeletal:      Cervical back: Neck supple  Right lower leg: Edema present  Left lower leg: Edema present  Comments: Bilateral lower extremity nonpitting edema   Skin:     General: Skin is warm and dry  Capillary Refill: Capillary refill takes less than 2 seconds  Comments: Right pannus swollen, pink, nontender  Left pannus draining fluid into pad  Neurological:      General: No focal deficit present  Mental Status: She is alert  Additional Data:     Labs:    Results from last 7 days   Lab Units 05/29/22  0436   WBC Thousand/uL 7 40   HEMOGLOBIN g/dL 10 5*   HEMATOCRIT % 36 9   PLATELETS Thousands/uL 368   NEUTROS PCT % 77*   LYMPHS PCT % 13*   MONOS PCT % 6   EOS PCT % 3     Results from last 7 days   Lab Units 05/29/22  0436 05/28/22  0608 05/27/22  2153   SODIUM mmol/L 137   < > 136   POTASSIUM mmol/L 4 3   < > 3 7   CHLORIDE mmol/L 100   < > 99   CO2 mmol/L 29   < > 29   BUN mg/dL 22   < > 25   CREATININE mg/dL 0 95   < > 1 02   ANION GAP mmol/L 8   < > 8   CALCIUM mg/dL 8 8   < > 8 5   ALBUMIN g/dL  --   --  2 8*   TOTAL BILIRUBIN mg/dL  --   --  0 68   ALK PHOS U/L  --   --  109*   ALT U/L  --   --  9   AST U/L  --   --  16   GLUCOSE RANDOM mg/dL 113   < > 131    < > = values in this interval not displayed       Results from last 7 days   Lab Units 05/29/22  0436   INR  3 91*             Results from last 7 days   Lab Units 05/28/22  0608 05/27/22 2153   PROCALCITONIN ng/ml 0 06 0 06           * I Have Reviewed All Lab Data Listed Above  * Additional Pertinent Lab Tests Reviewed: [facility]    Recent Cultures (last 7 days):     Results from last 7 days   Lab Units 05/27/22 2153 05/27/22 2042   BLOOD CULTURE  Received in Microbiology Lab  Culture in Progress  Received in Microbiology Lab  Culture in Progress  Last 24 Hours Medication List:   Current Facility-Administered Medications   Medication Dose Route Frequency Provider Last Rate    acetaminophen  650 mg Oral Q4H PRN Ray Talamantes PA-C      allopurinol  100 mg Oral Daily [place], [place]      DULoxetine  20 mg Oral Daily [place]      enoxaparin  60 mg Subcutaneous BID [place]      furosemide  40 mg Intravenous BID (diuretic) Ray Talamantes PA-C      levothyroxine  125 mcg Oral Daily [place], [place]      ondansetron  4 mg Intravenous Q6H PRN Ray Talamantes PA-C      saccharomyces boulardii  250 mg Oral BID [place], [place]      triamcinolone   Topical BID [place], [place]      vancomycin  2,000 mg Intravenous Q12H [facility] 2,000 mg (05/29/22 0921)    vancomycin  125 mg Oral Q6H [address] Anjelica Soares PA-C          Today, Patient Was Seen By: ORACIO Parra    ** Please Note: Dictation voice to text software may have been used in the creation of this document   **

## 2022-05-29 NOTE — ASSESSMENT & PLAN NOTE
· Recurrent cellulitis with drainage on her left hip more severe,also has an area on her the right pannus  · Patient recently hospitalized at [facility] for similar issues and was discharged home on doxycycline and Keflex to treat for 14 day antibiotic course - completed 5/20  · Procalcitonin 0 06, 0 06  · Follow-up blood culture  · Started on vancomycin and cefepime in the ED-will continue vancomycin  · Serial exams  · Monitor labs

## 2022-05-30 PROBLEM — R78.81 GRAM-POSITIVE BACTEREMIA: Status: ACTIVE | Noted: 2022-05-30

## 2022-05-30 PROBLEM — Z86.19 HISTORY OF CLOSTRIDIOIDES DIFFICILE INFECTION: Status: ACTIVE | Noted: 2022-01-08

## 2022-05-30 LAB
ANION GAP SERPL CALCULATED.3IONS-SCNC: 9 MMOL/L (ref 4–13)
BASOPHILS # BLD AUTO: 0.05 THOUSANDS/ΜL (ref 0–0.1)
BASOPHILS NFR BLD AUTO: 1 % (ref 0–1)
BUN SERPL-MCNC: 21 MG/DL (ref 5–25)
CALCIUM SERPL-MCNC: 9 MG/DL (ref 8.4–10.2)
CHLORIDE SERPL-SCNC: 100 MMOL/L (ref 96–108)
CO2 SERPL-SCNC: 30 MMOL/L (ref 21–32)
CREAT SERPL-MCNC: 1.05 MG/DL (ref 0.6–1.3)
EOSINOPHIL # BLD AUTO: 0.28 THOUSAND/ΜL (ref 0–0.61)
EOSINOPHIL NFR BLD AUTO: 4 % (ref 0–6)
ERYTHROCYTE [DISTWIDTH] IN BLOOD BY AUTOMATED COUNT: 16.6 % (ref 11.6–15.1)
GFR SERPL CREATININE-BSD FRML MDRD: 53 ML/MIN/1.73SQ M
GLUCOSE SERPL-MCNC: 95 MG/DL (ref 65–140)
HCT VFR BLD AUTO: 38.5 % (ref 34.8–46.1)
HGB BLD-MCNC: 11.3 G/DL (ref 11.5–15.4)
IMM GRANULOCYTES # BLD AUTO: 0.03 THOUSAND/UL (ref 0–0.2)
IMM GRANULOCYTES NFR BLD AUTO: 0 % (ref 0–2)
INR PPP: 3.22 (ref 0.84–1.19)
LYMPHOCYTES # BLD AUTO: 1.05 THOUSANDS/ΜL (ref 0.6–4.47)
LYMPHOCYTES NFR BLD AUTO: 14 % (ref 14–44)
MCH RBC QN AUTO: 24.9 PG (ref 26.8–34.3)
MCHC RBC AUTO-ENTMCNC: 29.4 G/DL (ref 31.4–37.4)
MCV RBC AUTO: 85 FL (ref 82–98)
MONOCYTES # BLD AUTO: 0.51 THOUSAND/ΜL (ref 0.17–1.22)
MONOCYTES NFR BLD AUTO: 7 % (ref 4–12)
NEUTROPHILS # BLD AUTO: 5.75 THOUSANDS/ΜL (ref 1.85–7.62)
NEUTS SEG NFR BLD AUTO: 74 % (ref 43–75)
NRBC BLD AUTO-RTO: 0 /100 WBCS
PLATELET # BLD AUTO: 414 THOUSANDS/UL (ref 149–390)
PMV BLD AUTO: 8.7 FL (ref 8.9–12.7)
POTASSIUM SERPL-SCNC: 4.2 MMOL/L (ref 3.5–5.3)
PROTHROMBIN TIME: 32 SECONDS (ref 11.6–14.5)
RBC # BLD AUTO: 4.53 MILLION/UL (ref 3.81–5.12)
SODIUM SERPL-SCNC: 139 MMOL/L (ref 135–147)
WBC # BLD AUTO: 7.67 THOUSAND/UL (ref 4.31–10.16)

## 2022-05-30 PROCEDURE — 85025 COMPLETE CBC W/AUTO DIFF WBC: CPT | Performed by: NURSE PRACTITIONER

## 2022-05-30 PROCEDURE — 80048 BASIC METABOLIC PNL TOTAL CA: CPT | Performed by: NURSE PRACTITIONER

## 2022-05-30 PROCEDURE — 85610 PROTHROMBIN TIME: CPT | Performed by: NURSE PRACTITIONER

## 2022-05-30 PROCEDURE — 99232 SBSQ HOSP IP/OBS MODERATE 35: CPT | Performed by: INTERNAL MEDICINE

## 2022-05-30 RX ADMIN — ALLOPURINOL 100 MG: 100 TABLET ORAL at 09:09

## 2022-05-30 RX ADMIN — ENOXAPARIN SODIUM 60 MG: 100 INJECTION SUBCUTANEOUS at 09:09

## 2022-05-30 RX ADMIN — ACETAMINOPHEN 650 MG: 325 TABLET ORAL at 03:31

## 2022-05-30 RX ADMIN — Medication 125 MG: at 13:21

## 2022-05-30 RX ADMIN — VANCOMYCIN HYDROCHLORIDE 2000 MG: 1 INJECTION, POWDER, LYOPHILIZED, FOR SOLUTION INTRAVENOUS at 09:17

## 2022-05-30 RX ADMIN — TRIAMCINOLONE ACETONIDE: 1 CREAM TOPICAL at 17:16

## 2022-05-30 RX ADMIN — LEVOTHYROXINE SODIUM 125 MCG: 25 TABLET ORAL at 09:09

## 2022-05-30 RX ADMIN — DULOXETINE 20 MG: 20 CAPSULE, DELAYED RELEASE ORAL at 09:09

## 2022-05-30 RX ADMIN — VANCOMYCIN HYDROCHLORIDE 2000 MG: 1 INJECTION, POWDER, LYOPHILIZED, FOR SOLUTION INTRAVENOUS at 21:04

## 2022-05-30 RX ADMIN — FUROSEMIDE 40 MG: 10 INJECTION, SOLUTION INTRAMUSCULAR; INTRAVENOUS at 17:13

## 2022-05-30 RX ADMIN — Medication 125 MG: at 00:16

## 2022-05-30 RX ADMIN — Medication 250 MG: at 17:13

## 2022-05-30 RX ADMIN — FUROSEMIDE 40 MG: 10 INJECTION, SOLUTION INTRAMUSCULAR; INTRAVENOUS at 09:09

## 2022-05-30 RX ADMIN — Medication 250 MG: at 09:09

## 2022-05-30 RX ADMIN — Medication 125 MG: at 05:07

## 2022-05-30 RX ADMIN — Medication 125 MG: at 17:13

## 2022-05-30 RX ADMIN — TRIAMCINOLONE ACETONIDE: 1 CREAM TOPICAL at 09:14

## 2022-05-30 RX ADMIN — ENOXAPARIN SODIUM 60 MG: 100 INJECTION SUBCUTANEOUS at 17:13

## 2022-05-30 NOTE — ASSESSMENT & PLAN NOTE
· Stable at baseline    Results from last 7 days   Lab Units 05/30/22  0427 05/29/22  0436 05/28/22  0608 05/27/22  2153   BUN mg/dL 21 22 23 25   CREATININE mg/dL 1 05 0 95 0 95 1 02   EGFR ml/min/1 73sq m 53 60 60 55

## 2022-05-30 NOTE — PROGRESS NOTES
Consult for pt with high BMI 85 42  She is receiving a low sodium diet  Her weight has been stable over past 6 months  She is receiving a regular house diet stated "I'm ordering what I can eat " Continue diet as prescribed

## 2022-05-30 NOTE — PLAN OF CARE
Problem: Potential for Falls  Goal: Patient will remain free of falls  Description: INTERVENTIONS:  - Educate patient/family on patient safety including physical limitations  - Instruct patient to call for assistance with activity   - Consult OT/PT to assist with strengthening/mobility   - Keep Call bell within reach  - Keep bed low and locked with side rails adjusted as appropriate  - Keep care items and personal belongings within reach  - Initiate and maintain comfort rounds  - Make Fall Risk Sign visible to staff  - Offer Toileting every 2 Hours, in advance of need  - Initiate/Maintain bed alarm  - Obtain necessary fall risk management equipment:   - Apply yellow socks and bracelet for high fall risk patients  - Consider moving patient to room near nurses station  Outcome: Progressing     Problem: INFECTION - ADULT  Goal: Absence or prevention of progression during hospitalization  Description: INTERVENTIONS:  - Assess and monitor for signs and symptoms of infection  - Monitor lab/diagnostic results  - Monitor all insertion sites, i e  indwelling lines, tubes, and drains  - Monitor endotracheal if appropriate and nasal secretions for changes in amount and color  - [place] appropriate cooling/warming therapies per order  - Administer medications as ordered  - Instruct and encourage patient and family to use good hand hygiene technique  - Identify and instruct in appropriate isolation precautions for identified infection/condition  Outcome: Progressing  Goal: Absence of fever/infection during neutropenic period  Description: INTERVENTIONS:  - Monitor WBC    Outcome: Progressing     Problem: SAFETY ADULT  Goal: Maintain or return to baseline ADL function  Description: INTERVENTIONS:  -  Assess patient's ability to carry out ADLs; assess patient's baseline for ADL function and identify physical deficits which impact ability to perform ADLs (bathing, care of mouth/teeth, toileting, grooming, dressing, etc )  - Assess/evaluate cause of self-care deficits   - Assess range of motion  - Assess patient's mobility; develop plan if impaired  - Assess patient's need for assistive devices and provide as appropriate  - Encourage maximum independence but intervene and supervise when necessary  - Involve family in performance of ADLs  - Assess for home care needs following discharge   - Consider OT consult to assist with ADL evaluation and planning for discharge  - Provide patient education as appropriate  Outcome: Progressing  Goal: Maintains/Returns to pre admission functional level  Description: INTERVENTIONS:  - Perform BMAT or MOVE assessment daily    - Set and communicate daily mobility goal to care team and patient/family/caregiver  - Collaborate with rehabilitation services on mobility goals if consulted  - Perform Range of Motion 3 times a day  - Reposition patient every 2 hours  - Record patient progress and toleration of activity level   Outcome: Progressing     Problem: DISCHARGE PLANNING  Goal: Discharge to home or other facility with appropriate resources  Description: INTERVENTIONS:  - Identify barriers to discharge w/patient and caregiver  - Arrange for needed discharge resources and transportation as appropriate  - Identify discharge learning needs (meds, wound care, etc )  - Arrange for interpretive services to assist at discharge as needed  - Refer to Case Management Department for coordinating discharge planning if the patient needs post-hospital services based on physician/advanced practitioner order or complex needs related to functional status, cognitive ability, or social support system  Outcome: Progressing     Problem: Knowledge Deficit  Goal: Patient/family/caregiver demonstrates understanding of disease process, treatment plan, medications, and discharge instructions  Description: Complete learning assessment and assess knowledge base    Interventions:  - Provide teaching at level of understanding  - Provide teaching via preferred learning methods  Outcome: Progressing     Problem: MOBILITY - ADULT  Goal: Maintain or return to baseline ADL function  Description: INTERVENTIONS:  -  Assess patient's ability to carry out ADLs; assess patient's baseline for ADL function and identify physical deficits which impact ability to perform ADLs (bathing, care of mouth/teeth, toileting, grooming, dressing, etc )  - Assess/evaluate cause of self-care deficits   - Assess range of motion  - Assess patient's mobility; develop plan if impaired  - Assess patient's need for assistive devices and provide as appropriate  - Encourage maximum independence but intervene and supervise when necessary  - Involve family in performance of ADLs  - Assess for home care needs following discharge   - Consider OT consult to assist with ADL evaluation and planning for discharge  - Provide patient education as appropriate  Outcome: Progressing  Goal: Maintains/Returns to pre admission functional level  Description: INTERVENTIONS:  - Perform BMAT or MOVE assessment daily    - Set and communicate daily mobility goal to care team and patient/family/caregiver  - Collaborate with rehabilitation services on mobility goals if consulted  - Perform Range of Motion 3 times a day  - Reposition patient every 2 hours      - Out of bed for toileting  - Record patient progress and toleration of activity level   Outcome: Progressing

## 2022-05-30 NOTE — ASSESSMENT & PLAN NOTE
Wt Readings from Last 3 Encounters:   05/08/22 (!) 212 kg (467 lb)   05/06/22 (!) 193 kg (425 lb)   04/04/22 (!) 181 kg (400 lb)     · Continue IV diuresis for lower extremity swelling and panniculitis

## 2022-05-30 NOTE — PROGRESS NOTES
Toby 46  Progress Note - Saul Millerde 1950, 80 y o  female MRN: [id number]  Unit/Bed#: -01 Encounter: [id number]  Primary Care Provider: ORACIO Márquez   Date and time admitted to hospital: 5/27/2022  7:52 PM    * Panniculitis  Assessment & Plan  44-year-old female with morbid obesity and diastolic CHF recently hospitalized at [facility] who re-presented for recurrent cellulitis of pannus  · Currently on vancomycin  Positive blood culture may be contaminant  · Infectious disease consulted  Results from last 7 days   Lab Units 05/28/22  0608 05/27/22  2153   PROCALCITONIN ng/ml 0 06 0 06       Positive blood culture  Assessment & Plan  · Positive blood culture may be contaminant  · Consult infectious disease  · Continue vancomycin for now for panniculitis    Chronic atrial fibrillation ([facility] [address] )  Assessment & Plan  · Intrinsically controlled    · INR remains supratherapeutic -- continue holding warfarin for now    Results from last 7 days   Lab Units 05/30/22  0427 05/29/22  0436 05/28/22  0608   INR  3 22* 3 91* 3 97*       History of Clostridioides difficile infection  Assessment & Plan  · Continue oral vancomycin and probiotics while on systemic antibiotics    Gout  Assessment & Plan  · Continue allopurinol    Depression with anxiety  Assessment & Plan  · Mood stable continue duloxetine    Stage 3a chronic kidney disease ([facility] [address] )  Assessment & Plan  · Stable at baseline    Results from last 7 days   Lab Units 05/30/22  0427 05/29/22  0436 05/28/22  0608 05/27/22  2153   BUN mg/dL 21 22 23 25   CREATININE mg/dL 1 05 0 95 0 95 1 02   EGFR ml/min/1 73sq m 53 60 60 55       Lymphedema of extremity  Assessment & Plan  · Chronic lymphedema with panniculitis  · Continue IV furosemide to expedite diuresis    Chronic diastolic congestive heart failure (HCC)  Assessment & Plan  Wt Readings from Last 3 Encounters:   05/08/22 (!) 212 kg (467 lb)   05/06/22 (!) 193 kg (425 lb)   04/04/22 (!) 181 kg (400 lb)     · Continue IV diuresis for lower extremity swelling and panniculitis    Super-super obese (HCC)  Assessment & Plan  · Body mass index is 85 42 kg/m²  Other specified hypothyroidism  Assessment & Plan  · Continue levothyroxine      VTE Pharmacologic Prophylaxis: VTE Score: 6 High Risk (Score >/= 5) - Pharmacological DVT Prophylaxis Ordered: Warfarin (Coumadin)  Sequential Compression Devices Ordered  Patient Centered Rounds: I have performed bedside rounds with nursing staff today  Discussions with Specialists or Other Care Team Provider:  Infectious Disease    Education and Discussions with Family / Patient:  Left voicemail with caretaker    Time Spent for Care: 20 mins  More than 50% of total time spent on counseling and coordination of care as described above  Current Length of Stay: 3 day(s)  Current Patient Status: Inpatient   Certification Statement: The patient will continue to require additional inpatient hospital stay due to positive blood culture and panniculitis  Discharge Plan / Estimated Discharge Date: Anticipate discharge in 48 hrs to home with home services  Code Status: Level 1 - Full Code      Subjective:   Patient seen and examined  Still having erythema of pannus  No diarrhea  Objective:   Vitals: Blood pressure 114/60, pulse 75, temperature (!) 97 1 °F (36 2 °C), temperature source Temporal, resp  rate 18, height 5' 2" (1 575 m), weight (!) 212 kg (467 lb), SpO2 94 %  Physical Exam  Vitals reviewed  Constitutional:       General: She is not in acute distress  Appearance: She is obese  HENT:      Head: Atraumatic  Cardiovascular:      Rate and Rhythm: Rhythm irregular  Pulmonary:      Breath sounds: Decreased breath sounds present  No wheezing  Abdominal:      Palpations: Abdomen is soft  Tenderness: There is no guarding or rebound        Comments: + erythema of pannus   Musculoskeletal:         General: Swelling (Lower extremities) present  Skin:     General: Skin is warm  Neurological:      Mental Status: She is alert and oriented to person, place, and time  Mental status is at baseline  Psychiatric:         Mood and Affect: Mood normal        Additional Data:   Labs:  Results from last 7 days   Lab Units 05/30/22 0427 05/29/22 0436 05/28/22 0608 05/27/22 2042   WBC Thousand/uL 7 67 7 40 9 37 10 68*   HEMOGLOBIN g/dL 11 3* 10 5* 10 6* 11 6   HEMATOCRIT % 38 5 36 9 36 1 38 6   MCV fL 85 86 84 80*   PLATELETS Thousands/uL 414* 368 390 459*   INR  3 22* 3 91* 3 97*  --      Results from last 7 days   Lab Units 05/30/22 0427 05/29/22 0436 05/28/22 0608 05/27/22 2153   SODIUM mmol/L 139 137 136 136   POTASSIUM mmol/L 4 2 4 3 3 7 3 7   CHLORIDE mmol/L 100 100 103 99   CO2 mmol/L 30 29 29 29   ANION GAP mmol/L 9 8 4 8   BUN mg/dL 21 22 23 25   CREATININE mg/dL 1 05 0 95 0 95 1 02   CALCIUM mg/dL 9 0 8 8 8 5 8 5   ALBUMIN g/dL  --   --   --  2 8*   TOTAL BILIRUBIN mg/dL  --   --   --  0 68   ALK PHOS U/L  --   --   --  109*   ALT U/L  --   --   --  9   AST U/L  --   --   --  16   EGFR ml/min/1 73sq m 53 60 60 55   GLUCOSE RANDOM mg/dL 95 113 114 131                      Results from last 7 days   Lab Units 05/28/22  0608   PROCALCITONIN ng/ml 0 06                 * I Have Reviewed All Lab Data Listed Above  Cultures:   Results from last 7 days   Lab Units 05/27/22 2153 05/27/22 2042   BLOOD CULTURE   --  No Growth at 48 hrs  GRAM STAIN RESULT  Gram positive cocci in clusters*  --                  Lines/Drains:  Invasive Devices  Report    Peripheral Intravenous Line  Duration           Peripheral IV 05/28/22 Proximal;Right;Ventral (anterior) Forearm 1 day              Telemetry:      Imaging:  Imaging Reports Reviewed Today Include:   No results found      Scheduled Meds:  Current Facility-Administered Medications   Medication Dose Route Frequency Provider Last Rate    acetaminophen  650 mg Oral Q4H PRN Coleen Fitzgerald PA-C      allopurinol  100 mg Oral Daily [place], [place]      DULoxetine  20 mg Oral Daily [place], [place]      enoxaparin  60 mg Subcutaneous BID [place], [place]      furosemide  40 mg Intravenous BID (diuretic) Coleen Fitzgerald PA-C      levothyroxine  125 mcg Oral Daily [place], [place]      ondansetron  4 mg Intravenous Q6H PRN Coleen Fitzgerald PA-C      saccharomyces boulardii  250 mg Oral BID [place], [place]      triamcinolone   Topical BID [place], [place]      vancomycin  2,000 mg Intravenous Q12H [facility] MYA Sweet 2,000 mg (05/30/22 0917)    vancomycin  125 mg Oral Q6H [address] Anjelica Curran PA-C         Today, Patient Was Seen By: Cresencio Corona DO    ** Please Note: Dictation voice to text software may have been used in the creation of this document   **

## 2022-05-30 NOTE — PLAN OF CARE
Problem: Potential for Falls  Goal: Patient will remain free of falls  Description: INTERVENTIONS:  - Educate patient/family on patient safety including physical limitations  - Instruct patient to call for assistance with activity   - Consult OT/PT to assist with strengthening/mobility   - Keep Call bell within reach  - Keep bed low and locked with side rails adjusted as appropriate  - Keep care items and personal belongings within reach  - Initiate and maintain comfort rounds  - Make Fall Risk Sign visible to staff  - Offer Toileting every 2 Hours, in advance of need  - Initiate/Maintain bed alarm  - Obtain necessary fall risk management equipment: non skid socks  - Apply yellow socks and bracelet for high fall risk patients  - Consider moving patient to room near nurses station  Outcome: Not Progressing     Problem: PAIN - ADULT  Goal: Verbalizes/displays adequate comfort level or baseline comfort level  Description: Interventions:  - Encourage patient to monitor pain and request assistance  - Assess pain using appropriate pain scale  - Administer analgesics based on type and severity of pain and evaluate response  - Implement non-pharmacological measures as appropriate and evaluate response  - Consider cultural and social influences on pain and pain management  - Notify physician/advanced practitioner if interventions unsuccessful or patient reports new pain  Outcome: Not Progressing

## 2022-05-30 NOTE — ASSESSMENT & PLAN NOTE
· Positive blood culture may be contaminant  · Consult infectious disease    · Continue vancomycin for now for panniculitis

## 2022-05-30 NOTE — ASSESSMENT & PLAN NOTE
· Intrinsically controlled    · INR remains supratherapeutic -- continue holding warfarin for now    Results from last 7 days   Lab Units 05/30/22  0427 05/29/22  0436 05/28/22  0608   INR  3 22* 3 91* 3 97*

## 2022-05-30 NOTE — ASSESSMENT & PLAN NOTE
80-year-old female with morbid obesity and diastolic CHF recently hospitalized at [facility] who re-presented for recurrent cellulitis of pannus  · Currently on vancomycin  Positive blood culture may be contaminant  · Infectious disease consulted      Results from last 7 days   Lab Units 05/28/22  0608 05/27/22  2153   PROCALCITONIN ng/ml 0 06 0 06

## 2022-05-31 ENCOUNTER — APPOINTMENT (INPATIENT)
Dept: ULTRASOUND IMAGING | Facility: HOSPITAL | Age: 72
DRG: 603 | End: 2022-05-31
Payer: MEDICARE

## 2022-05-31 LAB
ALBUMIN SERPL BCP-MCNC: 2.9 G/DL (ref 3.5–5)
ALP SERPL-CCNC: 109 U/L (ref 34–104)
ALT SERPL W P-5'-P-CCNC: 16 U/L (ref 7–52)
ANION GAP SERPL CALCULATED.3IONS-SCNC: 8 MMOL/L (ref 4–13)
AST SERPL W P-5'-P-CCNC: 22 U/L (ref 13–39)
BILIRUB SERPL-MCNC: 0.52 MG/DL (ref 0.2–1)
BUN SERPL-MCNC: 23 MG/DL (ref 5–25)
CALCIUM ALBUM COR SERPL-MCNC: 9.7 MG/DL (ref 8.3–10.1)
CALCIUM SERPL-MCNC: 8.8 MG/DL (ref 8.4–10.2)
CHLORIDE SERPL-SCNC: 100 MMOL/L (ref 96–108)
CO2 SERPL-SCNC: 30 MMOL/L (ref 21–32)
CREAT SERPL-MCNC: 1.03 MG/DL (ref 0.6–1.3)
ERYTHROCYTE [DISTWIDTH] IN BLOOD BY AUTOMATED COUNT: 16.8 % (ref 11.6–15.1)
GFR SERPL CREATININE-BSD FRML MDRD: 55 ML/MIN/1.73SQ M
GLUCOSE SERPL-MCNC: 96 MG/DL (ref 65–140)
HCT VFR BLD AUTO: 38.4 % (ref 34.8–46.1)
HGB BLD-MCNC: 10.9 G/DL (ref 11.5–15.4)
INR PPP: 2.37 (ref 0.84–1.19)
MCH RBC QN AUTO: 23.9 PG (ref 26.8–34.3)
MCHC RBC AUTO-ENTMCNC: 28.4 G/DL (ref 31.4–37.4)
MCV RBC AUTO: 84 FL (ref 82–98)
PLATELET # BLD AUTO: 401 THOUSANDS/UL (ref 149–390)
PMV BLD AUTO: 8.7 FL (ref 8.9–12.7)
POTASSIUM SERPL-SCNC: 3.8 MMOL/L (ref 3.5–5.3)
PROT SERPL-MCNC: 7.1 G/DL (ref 6.4–8.4)
PROTHROMBIN TIME: 25.4 SECONDS (ref 11.6–14.5)
RBC # BLD AUTO: 4.56 MILLION/UL (ref 3.81–5.12)
SODIUM SERPL-SCNC: 138 MMOL/L (ref 135–147)
VANCOMYCIN TROUGH SERPL-MCNC: 47.5 UG/ML (ref 10–20)
WBC # BLD AUTO: 7.29 THOUSAND/UL (ref 4.31–10.16)

## 2022-05-31 PROCEDURE — 99223 1ST HOSP IP/OBS HIGH 75: CPT

## 2022-05-31 PROCEDURE — 80053 COMPREHEN METABOLIC PANEL: CPT | Performed by: INTERNAL MEDICINE

## 2022-05-31 PROCEDURE — 85610 PROTHROMBIN TIME: CPT | Performed by: NURSE PRACTITIONER

## 2022-05-31 PROCEDURE — 97167 OT EVAL HIGH COMPLEX 60 MIN: CPT

## 2022-05-31 PROCEDURE — 87070 CULTURE OTHR SPECIMN AEROBIC: CPT | Performed by: INTERNAL MEDICINE

## 2022-05-31 PROCEDURE — 87186 SC STD MICRODIL/AGAR DIL: CPT | Performed by: INTERNAL MEDICINE

## 2022-05-31 PROCEDURE — 85027 COMPLETE CBC AUTOMATED: CPT | Performed by: INTERNAL MEDICINE

## 2022-05-31 PROCEDURE — 87181 SC STD AGAR DILUTION PER AGT: CPT | Performed by: INTERNAL MEDICINE

## 2022-05-31 PROCEDURE — 87077 CULTURE AEROBIC IDENTIFY: CPT | Performed by: INTERNAL MEDICINE

## 2022-05-31 PROCEDURE — 87205 SMEAR GRAM STAIN: CPT | Performed by: INTERNAL MEDICINE

## 2022-05-31 PROCEDURE — 80202 ASSAY OF VANCOMYCIN: CPT | Performed by: INTERNAL MEDICINE

## 2022-05-31 PROCEDURE — 99232 SBSQ HOSP IP/OBS MODERATE 35: CPT | Performed by: INTERNAL MEDICINE

## 2022-05-31 PROCEDURE — 76705 ECHO EXAM OF ABDOMEN: CPT

## 2022-05-31 PROCEDURE — G0427 INPT/ED TELECONSULT70: HCPCS | Performed by: INTERNAL MEDICINE

## 2022-05-31 RX ORDER — CEFEPIME HYDROCHLORIDE 2 G/50ML
2000 INJECTION, SOLUTION INTRAVENOUS EVERY 12 HOURS
Status: COMPLETED | OUTPATIENT
Start: 2022-05-31 | End: 2022-06-10

## 2022-05-31 RX ORDER — WARFARIN SODIUM 5 MG/1
5 TABLET ORAL EVERY OTHER DAY
Status: DISCONTINUED | OUTPATIENT
Start: 2022-05-31 | End: 2022-06-06

## 2022-05-31 RX ORDER — WARFARIN SODIUM 2 MG/1
2 TABLET ORAL EVERY OTHER DAY
Status: DISCONTINUED | OUTPATIENT
Start: 2022-06-01 | End: 2022-06-06

## 2022-05-31 RX ORDER — CEFEPIME HYDROCHLORIDE 2 G/1
2000 INJECTION, POWDER, FOR SOLUTION INTRAVENOUS EVERY 12 HOURS
Status: DISCONTINUED | OUTPATIENT
Start: 2022-05-31 | End: 2022-05-31

## 2022-05-31 RX ADMIN — LEVOTHYROXINE SODIUM 125 MCG: 25 TABLET ORAL at 08:55

## 2022-05-31 RX ADMIN — Medication 125 MG: at 11:53

## 2022-05-31 RX ADMIN — Medication 250 MG: at 17:28

## 2022-05-31 RX ADMIN — TRIAMCINOLONE ACETONIDE: 1 CREAM TOPICAL at 17:43

## 2022-05-31 RX ADMIN — Medication 125 MG: at 05:22

## 2022-05-31 RX ADMIN — Medication 125 MG: at 17:29

## 2022-05-31 RX ADMIN — FUROSEMIDE 40 MG: 10 INJECTION, SOLUTION INTRAMUSCULAR; INTRAVENOUS at 17:28

## 2022-05-31 RX ADMIN — WARFARIN SODIUM 5 MG: 5 TABLET ORAL at 17:28

## 2022-05-31 RX ADMIN — FUROSEMIDE 40 MG: 10 INJECTION, SOLUTION INTRAMUSCULAR; INTRAVENOUS at 08:56

## 2022-05-31 RX ADMIN — CEFEPIME HYDROCHLORIDE 2000 MG: 2 INJECTION, SOLUTION INTRAVENOUS at 13:48

## 2022-05-31 RX ADMIN — Medication 250 MG: at 08:55

## 2022-05-31 RX ADMIN — ALLOPURINOL 100 MG: 100 TABLET ORAL at 08:56

## 2022-05-31 RX ADMIN — TRIAMCINOLONE ACETONIDE: 1 CREAM TOPICAL at 09:00

## 2022-05-31 RX ADMIN — DULOXETINE 20 MG: 20 CAPSULE, DELAYED RELEASE ORAL at 08:56

## 2022-05-31 RX ADMIN — Medication 125 MG: at 00:03

## 2022-05-31 NOTE — ASSESSMENT & PLAN NOTE
52-year-old female with morbid obesity and diastolic CHF recently hospitalized at [facility] who re-presented for recurrent cellulitis of pannus  · Currently on vancomycin  Positive blood culture may be contaminant  · Infectious disease consulted  · Continue vancomycin with pharmacy protocol  Added cefepime per recommendations    · Consult surgery and check ultrasound for abscess    Results from last 7 days   Lab Units 05/28/22  0608 05/27/22  2153   PROCALCITONIN ng/ml 0 06 0 06

## 2022-05-31 NOTE — ASSESSMENT & PLAN NOTE
· Intrinsically controlled  · INR has been supratherapeutic now within limits    Will restart warfarin tonight    Results from last 7 days   Lab Units 05/31/22  0446 05/30/22  0427 05/29/22  0436 05/28/22  0608   INR  2 37* 3 22* 3 91* 3 97*

## 2022-05-31 NOTE — OCCUPATIONAL THERAPY NOTE
Occupational Therapy Evaluation     Patient Name: Sana BERNAL Date: 5/31/2022  Problem List  Principal Problem:    Panniculitis  Active Problems:    Other specified hypothyroidism    Super-super obese ([facility] [address] )    Chronic diastolic congestive heart failure ([facility] )    Lymphedema of extremity    Stage 3a chronic kidney disease ([facility] )    Depression with anxiety    Gout    History of Clostridioides difficile infection    Chronic atrial fibrillation (HCC)    Positive blood culture    Past Medical History  Past Medical History:   Diagnosis Date    Atrial fibrillation ([facility] )     Bladder stone     C  difficile colitis     Cellulitis     CHF (congestive heart failure) (HCC)     Depression with anxiety     Disease of thyroid gland     Diverticulitis     Enterocolitis     History of abnormal cervical Pap smear     Kidney stone     Lymphedema     Menopause     Age 53    Morbid obesity with BMI of 70 and over, adult ([facility] )     MRSA (methicillin resistant Staphylococcus aureus)     abdominal wound    Osteoarthritis     Phlebitis     left lower leg    Spondylolysis      Past Surgical History  Past Surgical History:   Procedure Laterality Date    APPENDECTOMY      CARPAL TUNNEL RELEASE      CHOLECYSTECTOMY      CYSTOSCOPY      stent    FOOT SURGERY  1982    bone spur    KNEE SURGERY      WISDOM TOOTH EXTRACTION               05/31/22 1000   Note Type   Note type Evaluation   Additional Comments Pt received supine in bed c HOB slightly elevated reporting mod pain R shoulder/hip but agreeable to OT services @ this time     Restrictions/Precautions   Weight Bearing Precautions Per Order No   Braces or Orthoses Other (Comment)  (none per patient)   Other Precautions Pain;Multiple lines   Pain Assessment   Pain Assessment Tool 0-10   Pain Score 7   Pain Location/Orientation Orientation: Right;Location: Shoulder   Pain Onset/Description Onset: Ongoing;Frequency: Intermittent   Patient's Stated Pain Goal No pain   Hospital Pain Intervention(s) Emotional support; Environmental changes;Repositioned   Multiple Pain Sites Yes   Pain 2   Pain Score 2 8   [place] FACES Pain Rating 2 8   Pain Location/Orientation 2 Orientation: Right;Location: Hip   Pain Onset/Description 2 Onset: Ongoing;Frequency: Intermittent   Patient's Stated Pain Goal 2 No pain   Hospital Pain Intervention(s) 2 Repositioned; Emotional support; Environmental changes   Home Living   Type of [address] [place] [address] Two level; Able to live on main level with bedroom/bathroom; Performs ADLs on one level;Ramped entrance   Bathroom Shower/Tub None   Bathroom Toilet Raised   Bathroom Equipment Built-in shower seat;Grab bars in shower; Toilet raiser  (Chair glide + walk in shower c bench + 2 GB, handicap sink w/c accessible, raised toilet)   Bathroom Accessibility Other (Comment)  (Bedridden, bed pan only + sponge bathes)   Home Equipment Stair glide; Hospital bed;Grab bars   Additional Comments   (Chair glide + walk in shower c bench + 2 GB, handicap sink w/c accessible, raised toilet)   Prior Function   Level of [place] Needs assistance with ADLs and functional mobility; Needs assistance with IADLs   Lives With Other (Comment)  (caregiver 12 hours/day)   Receives Help From Personal care attendant; Other (Comment)  (home care giver)   ADL Assistance Needs assistance   IADLs Needs assistance   Falls in the last 6 months 0   Vocational Retired   Comments @ baseline, caregiver 12 hours/day 7 days/week to assist with ADLs/IADLs  Pt performs UB ADLs c A + recieves total A for LB ADLs/toileting  Bed pan use c total A for toileting hygiene/ CM + wears - utilizes briefs for off-hours when caregivers not present  Lifestyle   Autonomy Pt lives in [address] home + recieves A for ADLs/IADLs via caregivers + bed ridden @ baseline     Reciprocal Relationships Caregivers, nephews   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[facility])   [address], word search Psychosocial   Psychosocial (WDL) WDL   Subjective   Subjective "Get the hell out of a bed"   ADL   Eating Assistance 6  Modified independent   Eating Deficit Setup   Grooming Assistance 6  Modified Independent   Grooming Deficit Setup   UB Bathing Assistance 2  Maximal Assistance   UB Bathing Deficit Setup; Increased time to complete   LB Bathing Assistance 1  Total Assistance   LB Bathing Deficit Increased time to complete   UB Dressing Assistance 2  Maximal Assistance   UB Dressing Deficit Increased time to complete; Thread LUE; Thread RUE;Pull around back;Pull down in back   LB Dressing Assistance 1  Total Assistance   LB Dressing Deficit Increased time to complete; Thread RLE into pants; Thread LLE into pants; Thread RLE into underwear; Thread LLE into underwear   Toileting Assistance  1  Total Assistance   Toileting Deficit Increased time to complete;Use of bedpan/urinal setup   Bed Mobility   Rolling R 2  Maximal assistance   Additional items Assist x 1;Bedrails;LE management; Increased time required   Activity Tolerance   Activity Tolerance Patient limited by pain   RUE Assessment   RUE Assessment WFL   LUE Assessment   LUE Assessment WFL   Hand Function   Gross Motor Coordination Functional   Fine Motor Coordination Functional   Sensation   Light Touch No apparent deficits   Proprioception   Proprioception No apparent deficits   Vision-Basic Assessment   Current Vision Wears glasses all the time   Visual History Cataracts  (R eye)   Patient Visual Report Other (Comment)  (No acute changes noted since hospitalization )   Vision - Complex Assessment   Ocular Range of Motion WFL   Cognition   Overall Cognitive Status WFL   Arousal/Participation Alert; Responsive; Cooperative   Attention Within functional limits   Orientation Level Oriented X4   Memory Within functional limits   Following Commands Follows all commands and directions without difficulty   Assessment   Assessment Pt is a 80 y o  female, admitted to [facility]  [address] [facility] 5/27/2022 d/t experiencing panniculitis  Pt with PMHx impacting their performance during ADL tasks, including: hypothyroidism, obsiety, CKD, lymphedema, depression c anxiety, gout, A-fib, CTS, OA  Prior to admission to the hospital Pt was performing ADLs with physical assistance  IADLs with physical assistance  Cognitive status was PTA was intact  OT order placed to assess Pt's ADLs, cognitive status, and performance during functional tasks in order to maximize safety and independence while making most appropriate d/c recommendations  Pt's clinical presentation is currently evolving given new onset deficits that effect pt's fxn  @ this time, pt presents @ fxl baseline including ADLs/IADLs as pt c 12 hr 7 day/week care c bed pan use for toileting + pt otherwise, bedridden + unable to transfer/mobilize  From an occupational therapy standpoint, evaluation only completed on this date + pt to return home c prior level of A/care  Recommendation   OT Discharge Recommendation No rehabilitation needs   OT - OK to Discharge Yes  (Once medically cleared + care coordinated appropriately + sufficiently for home  )   AM-PAC Daily Activity Inpatient   Lower Body Dressing 1   Bathing 1   Toileting 1   Upper Body Dressing 2   Grooming 3   Eating 3   Daily Activity Raw Score 11   Daily Activity Standardized Score (Calc for Raw Score >=11) 29 04   AM-PAC Applied Cognition Inpatient   Following a Speech/Presentation 4   Understanding Ordinary Conversation 4   Taking Medications 4   Remembering Where Things Are Placed or Put Away 4   Remembering List of 4-5 Errands 4   Taking Care of Complicated Tasks 4   Applied Cognition Raw Score 24   Applied Cognition Standardized Score 62 21   Brief Interview for Mental Status (BIMS)   Repetition of Three Words (First Attempt) 3   Temporial Orientation: Year Correct   Temporal Orientation: Month Accurate within 5 days   Temporal Orientation: Day Correct   Recall: "Sock" Yes, no cue required   Recall: "Blue" Yes, no cue required   Recall: "Bed" Yes, no cue required   BIMS Summary Score 15       The patient's raw score on the AM-PAC Daily Activity inpatient short form is 11, standardized score is 29 04, less than 39 4  Patients at this level are likely to benefit from DC to home  Although AM-PAC score < 39 4, pt c baseline fxn @ total cares, bedridden + immobile  Pt presents @ baseline fxn including max A UB ADLs, total A LB ADLs, bed pan for toileting, total A for CM/hygiene + max A in hospital bed c rails for bed mobility  Pt aware of current baseline fxn + able to verbally coordinate cares for return home when medically cleared       Anthony Calabrese OTR/L

## 2022-05-31 NOTE — CASE MANAGEMENT
Case Management Assessment & Discharge Planning Note    Patient name Fili Shine  Location [address] [id number] MRN [id number]  : 1950 Date 2022       Current Admission Date: 2022  Current Admission Diagnosis:Panniculitis   Patient Active Problem List    Diagnosis Date Noted    Positive blood culture 2022    Chronic atrial fibrillation ([facility] ) 2022    C  difficile colitis 2022    History of Clostridioides difficile infection 2022    Lymphedema of extremity 2021    Other specified hypothyroidism 10/03/2020    Mild episode of recurrent major depressive disorder ([facility] ) 10/03/2020    Idiopathic chronic 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 10/03/2020    Primary osteoarthritis involving multiple joints 10/03/2020    Super-super obese ([facility] ) 10/03/2020    Chronic diastolic congestive heart failure ([facility] ) 10/03/2020    Panniculitis 10/03/2020    Gastroesophageal reflux disease without esophagitis 10/03/2020    Hx MRSA infection 2020    Left ovarian cyst 2017    Depression with anxiety 07/15/2017    Anemia 2016    Ambulatory dysfunction 2016    Stage 3a chronic kidney disease ([facility] ) 2016    Adjustment disorder 2013    Irritable bowel syndrome 2012    Left atrial enlargement 2012    Left ventricular hypertrophy 2012    Carpal tunnel syndrome 2012    Gout 2012    Hyperlipidemia 2012    Osteoarthritis 2012    Symptomatic menopausal or female climacteric states 2012      LOS (days): 4  Geometric Mean LOS (GMLOS) (days): 3 20  Days to GMLOS:-0 5     OBJECTIVE:  PATIENT READMITTED TO HOSPITAL  Risk of Unplanned Readmission Score: 29 65         Current admission status: Inpatient  Referral Reason: Other (Discharge planning)    [facility]    [facility] [place] [address] [address] [place] [address]  Phone: [phone] Fax: [phone]    [address], [address]  [address]  [facility] [address]  Phone: [phone] Fax: [phone]    Primary Care Provider: ORACIO Harper    Primary Insurance: MEDICARE  Secondary Insurance: AARP    ASSESSMENT:  Active Health Care Proxies    There are no active Health Care Proxies on file  Advance Directives  Does patient have a [address]?: No  Was patient offered paperwork?: Yes  Does patient currently have a Health Care decision maker?: Yes, please see Health Care Proxy section  Does patient have Advance Directives?: No  Was patient offered paperwork?: Yes  Primary Contact: Dolores Carson - Friend         Readmission Root Cause  30 Day Readmission: No    Patient Information  Admitted from[de-identified] Home  Mental Status: Alert  During Assessment patient was accompanied by: Not accompanied during assessment  Assessment information provided by[de-identified] Patient  Primary Caregiver: Private caregiver  Caregiver's Name[de-identified] [address] [facility]  Caregiver's Relationship to Patient[de-identified] Other (Specify)  [address] [facility] Telephone Number[de-identified] [phone]  Support Systems: [address] [facility] [address] of Residence: [address] do [address] live in?: [address] access options   Select all that apply : Ramp  Type of Current Residence: 2 story home (Stays on 1st floor)  Upon entering residence, is there a bedroom on the main floor (no further steps)?: Yes  Upon entering residence, is there a bathroom on the main floor (no further steps)?: Yes  In the last 12 months, was there a time when you were not able to pay the mortgage or rent on time?: No  In the last 12 months, how many places have you lived?: 1  In the last 12 months, was there a time when you did not have a steady place to sleep or slept in a shelter (including now)?: No  Homeless/housing insecurity resource given?: N/A  Living Arrangements: Lives Alone  Is patient a ?: No    Activities of Daily Living Prior to Admission  Functional Status: Assistance  Completes ADLs independently?: No  Level of ADL dependence: Assistance  Ambulates independently?: No  Level of ambulatory dependence: Assistance  Does patient use assisted devices?: Yes  Assisted Devices (DME) used: Deandre Krueger  Does patient currently own DME?: Yes  Does patient have a history of Outpatient Therapy (PT/OT)?: No  Does the patient have a history of Short-Term Rehab?: Yes  Does patient have a history of HHC?: Yes (Joseline Sep)  Does patient currently have Iliana Tai?: Yes         Patient Information Continued  Income Source: Pension/longterm  Does patient have prescription coverage?: Yes  Within the past 12 months, you worried that your food would run out before you got the money to buy more : Never true  Within the past 12 months, the food you bought just didn't last and you didn't have money to get more : Never true  Food insecurity resource given?: N/A  Does patient receive dialysis treatments?: No  Does patient have a history of substance abuse?: No    PHQ 2/9 Screening   Reviewed PHQ 2/9 Depression Screening Score?: No    Means of Transportation  Means of Transport to Appts[de-identified] Other (Comment) (medical transport)  In the past 12 months, has lack of transportation kept you from medical appointments or from getting medications?: No  In the past 12 months, has lack of transportation kept you from meetings, work, or from getting things needed for daily living?: No  Was application for public transport provided?: N/A        DISCHARGE DETAILS:    Discharge planning discussed with[de-identified] Patient  Freedom of Choice: Yes  Comments - Freedom of Choice: Pt reports her CG through [employer] is with her for 12 hours per day 7 days per week through the [place] aging waiver program  However, her CG is currently ill and will be out of work for at least one week   Pt states she has spoken with Dahiana Adams her  through [employer] to attempt to set up another CG when she is discharged  She also reports she called her waiver  Smita Bernheim [phone] ext  137 to update and see if any other CG was available   CM left messages for both Pablo Nichols and Chelsea Ortiz to also determine CG availablity on d/c        DME Referral Provided  Referral made for DME?: No         Would you like to participate in our [address] service program?  : No - Declined    Treatment Team Recommendation: Home with [facility]  Discharge Destination Plan[de-identified] Home with Daniel at Discharge : [facility]

## 2022-05-31 NOTE — ASSESSMENT & PLAN NOTE
Wt Readings from Last 3 Encounters:   05/31/22 (!) 201 kg (444 lb 3 6 oz)   05/08/22 (!) 212 kg (467 lb)   05/06/22 (!) 193 kg (425 lb)     · Continue IV diuresis for lower extremity swelling and panniculitis

## 2022-05-31 NOTE — PROGRESS NOTES
[address]  Progress Note - Sana Maki 1950, 80 y o  female MRN: [id number]  Unit/Bed#: -01 Encounter: [id number]  Primary Care Provider: ORACIO Schaefer   Date and time admitted to hospital: 5/27/2022  7:52 PM    * Panniculitis  Assessment & Plan  43-year-old female with morbid obesity and diastolic CHF recently hospitalized at [facility] who re-presented for recurrent cellulitis of pannus  · Currently on vancomycin  Positive blood culture may be contaminant  · Infectious disease consulted  · Continue vancomycin with pharmacy protocol  Added cefepime per recommendations  · Consult surgery and check ultrasound for abscess    Results from last 7 days   Lab Units 05/28/22  0608 05/27/22  2153   PROCALCITONIN ng/ml 0 06 0 06       Positive blood culture  Assessment & Plan  · Positive blood culture may be contaminant  · Appreciate infectious disease evaluation  · Continue vancomycin for panniculitis    [facility] )  Assessment & Plan  · Intrinsically controlled  · INR has been supratherapeutic now within limits    Will restart warfarin tonight    Results from last 7 days   Lab Units 05/31/22  0446 05/30/22  0427 05/29/22  0436 05/28/22  0608   INR  2 37* 3 22* 3 91* 3 97*       History of Clostridioides difficile infection  Assessment & Plan  · Continue oral vancomycin and probiotics while on systemic antibiotics    Gout  Assessment & Plan  · Continue allopurinol    Depression with anxiety  Assessment & Plan  · Mood stable continue duloxetine    Stage 3a chronic kidney disease ([facility] )  Assessment & Plan  · Stable at baseline    Results from last 7 days   Lab Units 05/31/22  0446 05/30/22  0427 05/29/22  0436 05/28/22  0608 05/27/22  2153   BUN mg/dL 23 21 22 23 25   CREATININE mg/dL 1 03 1 05 0 95 0 95 1 02   EGFR ml/min/1 73sq m 55 53 60 60 55       Lymphedema of extremity  Assessment & Plan  · Chronic lymphedema with panniculitis  · Continue IV furosemide to expedite diuresis    Chronic diastolic congestive heart failure [facility])  Assessment & Plan  Wt Readings from Last 3 Encounters:   05/31/22 (!) 201 kg (444 lb 3 6 oz)   05/08/22 (!) 212 kg (467 lb)   05/06/22 (!) 193 kg (425 lb)     · Continue IV diuresis for lower extremity swelling and panniculitis    Super-super obese (HCC)  Assessment & Plan  · Body mass index is 81 25 kg/m²  Other specified hypothyroidism  Assessment & Plan  · Continue levothyroxine      VTE Pharmacologic Prophylaxis: VTE Score: 6 High Risk (Score >/= 5) - Pharmacological DVT Prophylaxis Ordered: Warfarin (Coumadin)  Sequential Compression Devices Ordered  Patient Centered Rounds: I have performed bedside rounds with nursing staff today  Discussions with Specialists or Other Care Team Provider:  Infectious Disease surgery and case management    Education and Discussions with Family / Patient:  Caretaker at bedside    Time Spent for Care: 25 mins  More than 50% of total time spent on counseling and coordination of care as described above  Current Length of Stay: 4 day(s)  Current Patient Status: Inpatient   Certification Statement: The patient will continue to require additional inpatient hospital stay due to panniculitis  Discharge Plan / Estimated Discharge Date: Anticipate discharge in 48-72 hrs to home with home services  Code Status: Level 1 - Full Code      Subjective:   Patient seen and examined  Still having draining from left pannus    Objective:   Vitals: Blood pressure 112/66, pulse 66, temperature 97 7 °F (36 5 °C), resp  rate 18, height 5' 2" (1 575 m), weight (!) 201 kg (444 lb 3 6 oz), SpO2 94 %  Physical Exam  Vitals reviewed  Constitutional:       General: She is not in acute distress  Appearance: Normal appearance  She is obese  HENT:      Head: Atraumatic  Eyes:      General: No scleral icterus  Extraocular Movements: Extraocular movements intact  Cardiovascular:      Rate and Rhythm: Regular rhythm  Heart sounds: Normal heart sounds  Pulmonary:      Breath sounds: Decreased breath sounds present  No wheezing  Abdominal:      Palpations: Abdomen is soft  Tenderness: There is no guarding or rebound  Comments: +obese with erythema of pannus   Musculoskeletal:         General: Swelling and tenderness present  Skin:     Comments: + erythema of of left pannus   Neurological:      Mental Status: She is alert and oriented to person, place, and time  Mental status is at baseline  Psychiatric:         Mood and Affect: Mood normal        Additional Data:   Labs:  Results from last 7 days   Lab Units 05/31/22 0446 05/30/22 0427 05/29/22 0436 05/28/22  0608 05/27/22  2042   WBC Thousand/uL 7 29 7 67 7 40 9 37 10 68*   HEMOGLOBIN g/dL 10 9* 11 3* 10 5* 10 6* 11 6   HEMATOCRIT % 38 4 38 5 36 9 36 1 38 6   MCV fL 84 85 86 84 80*   PLATELETS Thousands/uL 401* 414* 368 390 459*   INR  2 37* 3 22* 3 91* 3 97*  --      Results from last 7 days   Lab Units 05/31/22 0446 05/30/22 0427 05/29/22  0436 05/28/22  0608 05/27/22  2153   SODIUM mmol/L 138 139 137 136 136   POTASSIUM mmol/L 3 8 4 2 4 3 3 7 3 7   CHLORIDE mmol/L 100 100 100 103 99   CO2 mmol/L 30 30 29 29 29   ANION GAP mmol/L 8 9 8 4 8   BUN mg/dL 23 21 22 23 25   CREATININE mg/dL 1 03 1 05 0 95 0 95 1 02   CALCIUM mg/dL 8 8 9 0 8 8 8 5 8 5   ALBUMIN g/dL 2 9*  --   --   --  2 8*   TOTAL BILIRUBIN mg/dL 0 52  --   --   --  0 68   ALK PHOS U/L 109*  --   --   --  109*   ALT U/L 16  --   --   --  9   AST U/L 22  --   --   --  16   EGFR ml/min/1 73sq m 55 53 60 60 55   GLUCOSE RANDOM mg/dL 96 95 113 114 131                      Results from last 7 days   Lab Units 05/28/22  0608   PROCALCITONIN ng/ml 0 06                 * I Have Reviewed All Lab Data Listed Above      Cultures:   Results from last 7 days   Lab Units 05/27/22 2153 05/27/22 2042   BLOOD CULTURE  Staphylococcus coagulase negative* No Growth at 48 hrs  GRAM STAIN RESULT  Gram positive cocci in clusters*  --                  Lines/Drains:  Invasive Devices  Report    Peripheral Intravenous Line  Duration           Peripheral IV 05/28/22 Proximal;Right;Ventral (anterior) Forearm 2 days              Telemetry:      Imaging:  Imaging Reports Reviewed Today Include:   No results found  Scheduled Meds:  Current Facility-Administered Medications   Medication Dose Route Frequency Provider Last Rate    acetaminophen  650 mg Oral Q4H PRN Ray Talamantes PA-C      allopurinol  100 mg Oral Daily [place], [place]      cefepime  2,000 mg Intravenous Q12H Pérez Marquez DO 2,000 mg (05/31/22 1348)    DULoxetine  20 mg Oral Daily [facility]      furosemide  40 mg Intravenous BID (diuretic) Ray Talamantes PA-C      levothyroxine  125 mcg Oral Daily [place], [place]      ondansetron  4 mg Intravenous Q6H PRN Ray Talamantes PA-C      saccharomyces boulardii  250 mg Oral BID [place], [place]      triamcinolone   Topical BID [place], [place]      [START ON 6/1/2022] vancomycin  12 5 mg/kg (Adjusted) Intravenous Once PRN Ray Talamantes PA-C      vancomycin  125 mg Oral H[facility] Ray Talamantes PA-C         Today, Patient Was Seen By: Cody Long DO    ** Please Note: Dictation voice to text software may have been used in the creation of this document   **

## 2022-05-31 NOTE — CONSULTS
Consultation - General Surgery   Veronika Shetty 80 y o  female MRN: [id number]  Unit/Bed#: -01 Encounter: [id number]    Assessment:  80 y o  female presenting with panniculitis  Afebrile, other vital signs stable   WBC 7 29 from 7 67   Hgb 10 9 from 11 3   Cr 1 03 from 1 05   No new imaging    Plan:  F/u wound culture   Recommend US to evaluate for underlying abscess development   Continue local wound care  ID following, appreciate recommendations  Medical management per SLIM  Evaluated at bedside with Dr Ly Joseph      History of Present Illness   Chief Complaint: panniculitis     HPI:  Veronika Shetty is a 80 y o  female with past medical history significant for atrial fibrillation on warfarin, CHF, anxiety/depression, hypothyroidism, diverticulosis, lymphedema, morbid obesity, and previous MRSA infection who initially presented to [facility] ED on 05/27/2022 with complaints of erythematous abdomen  Of note patient with recent hospitalization at [facility] form 05/06/2022 - 05/13/2022 for similar complaints  Treated with course of IV vancomycin for MRSE and C diff but was later transitioned to oral doxycycline and keflex with negative MRSA culture and negative C diff PCR  Completed 14 day course total following discharge  For this admission, patient presented with increased erythema of left lower abdomen after completing oral antibiotics on 05/20/2022  General surgery consulted based on ID recommendations for evaluation of possible I&D  ID with recomndations for IV vancomycin and IV cefepime  Patient unable to evaluated with CT secondary to patient's size  Admits to previous increase in serous drainage from left lower abdomen but has since stopped  Denies pain to the area  Denies abdominal pain, nausea, vomiting  Admits to previous episodes of diarrhea  Denies chest pain, palpitations, shortness of breath, calf tenderness  Denies fever, chills         Review of Systems   Constitutional: Positive for activity change  Negative for appetite change, chills and fever  HENT: Negative  Respiratory: Negative for cough and shortness of breath  Cardiovascular: Negative for chest pain, palpitations and leg swelling  Gastrointestinal: Positive for abdominal pain and diarrhea  Negative for abdominal distention, constipation, nausea and vomiting  Endocrine: Negative for polydipsia, polyphagia and polyuria  Genitourinary: Negative for difficulty urinating, dysuria, frequency and urgency  Musculoskeletal: Negative for arthralgias and myalgias  Skin: Positive for color change (Bilateral panus with increasing erythema, left>right)  Neurological: Negative for dizziness, weakness, light-headedness, numbness and headaches       Historical Information   Past Medical History:   Diagnosis Date    Atrial fibrillation ([place] [address] )     Bladder stone     C  difficile colitis     Cellulitis     CHF (congestive heart failure) ([place] [address] )     Depression with anxiety     Disease of thyroid gland     Diverticulitis     Enterocolitis     History of abnormal cervical Pap smear     Kidney stone     Lymphedema     Menopause     Age 53    Morbid obesity with BMI of 70 and over, adult ([place] [address] )     MRSA (methicillin resistant Staphylococcus aureus)     abdominal wound    Osteoarthritis     Phlebitis     left lower leg    Spondylolysis      Past Surgical History:   Procedure Laterality Date    APPENDECTOMY      CARPAL TUNNEL RELEASE      CHOLECYSTECTOMY      CYSTOSCOPY      stent    FOOT SURGERY  1982    bone spur    KNEE SURGERY      WISDOM TOOTH EXTRACTION       Social History   Social History     Substance and Sexual Activity   Alcohol Use Not Currently     Social History     Substance and Sexual Activity   Drug Use Not Currently    Comment: As a teen - As per [employer]      E-Cigarette/Vaping    E-Cigarette Use Never User      E-Cigarette/Vaping Substances    Nicotine No     THC No     CBD No     Flavoring No     Other No     Unknown No      Social History     Tobacco Use   Smoking Status Former Smoker   Smokeless Tobacco Never Used   Tobacco Comment    5 packs/day until 6 (age 16-20) - As per [employer]      Family History:   Family History   Problem Relation Age of Onset    Heart failure Mother     Heart disease Mother     Lymphoma Mother     Kidney disease Father     Heart disease Father     Heart failure Father     Diabetes type II Father     Diabetes type II Sister     Diabetes type II Brother     Uterine cancer Paternal Aunt     Breast cancer Neg Hx     Colon cancer Neg Hx     Ovarian cancer Neg Hx        Meds/Allergies   all current active meds have been reviewed and current meds:   Current Facility-Administered Medications   Medication Dose Route Frequency    acetaminophen (TYLENOL) tablet 650 mg  650 mg Oral Q4H PRN    allopurinol (ZYLOPRIM) tablet 100 mg  100 mg Oral Daily    DULoxetine (CYMBALTA) delayed release capsule 20 mg  20 mg Oral Daily    furosemide (LASIX) injection 40 mg  40 mg Intravenous BID (diuretic)    levothyroxine tablet 125 mcg  125 mcg Oral Daily    ondansetron (ZOFRAN) injection 4 mg  4 mg Intravenous Q6H PRN    saccharomyces boulardii (FLORASTOR) capsule 250 mg  250 mg Oral BID    triamcinolone (KENALOG) 0 1 % cream   Topical BID    [START ON 6/1/2022] vancomycin (VANCOCIN) 1500 mg in sodium chloride 0 9% 250 mL IVPB  12 5 mg/kg (Adjusted) Intravenous Once PRN    vancomycin (VANCOCIN) oral solution 125 mg  125 mg Oral Q6H [address]     Allergies   Allergen Reactions    Augmentin Es-600  [Amoxicillin-Pot Clavulanate]     Penicillins Other (See Comments)     Tolerates cefepime (11/18/21)    Sulfa Antibiotics Hives    Vitamin C [Ascorbate - Food Allergy]     Latex Rash and Edema       Objective   First Vitals:   Blood Pressure: 94/65 (05/27/22 1955)  Pulse: 69 (05/27/22 1955)  Temperature: 97 6 °F (36 4 °C) (05/27/22 1955)  Temp Source: Tympanic (05/27/22 1955)  Respirations: 16 (05/27/22 1955)  Height: 5' 2" (157 5 cm) (05/27/22 1955)  Weight - Scale: (!) 212 kg (467 lb) (05/27/22 1955)  SpO2: 97 % (05/27/22 1955)    Current Vitals:   Blood Pressure: 112/66 (05/31/22 0750)  Pulse: 66 (05/31/22 0750)  Temperature: 97 7 °F (36 5 °C) (05/31/22 0750)  Temp Source: Temporal (05/30/22 2241)  Respirations: 18 (05/31/22 0750)  Height: 5' 2" (157 5 cm) (05/27/22 1955)  Weight - Scale: (!) 201 kg (444 lb 3 6 oz) (05/31/22 0600)  SpO2: 94 % (05/31/22 0750)      Intake/Output Summary (Last 24 hours) at 5/31/2022 1320  Last data filed at 5/31/2022 0750  Gross per 24 hour   Intake 480 ml   Output --   Net 480 ml       Invasive Devices  Report    Peripheral Intravenous Line  Duration           Peripheral IV 05/28/22 Proximal;Right;Ventral (anterior) Forearm 2 days                Physical Exam  Vitals and nursing note reviewed  Constitutional:       General: She is not in acute distress  Appearance: Normal appearance  She is obese  She is not ill-appearing or toxic-appearing  HENT:      Head: Normocephalic and atraumatic  Cardiovascular:      Rate and Rhythm: Normal rate  Rhythm irregular  Pulses: Normal pulses  Heart sounds: Normal heart sounds  No murmur heard  No friction rub  No gallop  Pulmonary:      Effort: Pulmonary effort is normal  No respiratory distress  Breath sounds: Normal breath sounds  No wheezing, rhonchi or rales  Abdominal:      General: Bowel sounds are normal  There is no distension  Palpations: Abdomen is soft  Tenderness: There is no abdominal tenderness  There is no guarding or rebound  Musculoskeletal:      Right lower leg: Edema present  Left lower leg: Edema present  Skin:     General: Skin is warm and dry  Capillary Refill: Capillary refill takes less than 2 seconds  Findings: Erythema present  Neurological:      General: No focal deficit present        Mental Status: She is alert and oriented to person, place, and time  Psychiatric:         Mood and Affect: Mood normal          Behavior: Behavior normal          Thought Content: Thought content normal          Lab Results:   I have personally reviewed pertinent lab results  CBC:   Lab Results   Component Value Date    WBC 7 29 05/31/2022    HGB 10 9 (L) 05/31/2022    HCT 38 4 05/31/2022    MCV 84 05/31/2022     (H) 05/31/2022    MCH 23 9 (L) 05/31/2022    MCHC 28 4 (L) 05/31/2022    RDW 16 8 (H) 05/31/2022    MPV 8 7 (L) 05/31/2022     CMP:   Lab Results   Component Value Date    SODIUM 138 05/31/2022    K 3 8 05/31/2022     05/31/2022    CO2 30 05/31/2022    BUN 23 05/31/2022    CREATININE 1 03 05/31/2022    CALCIUM 8 8 05/31/2022    AST 22 05/31/2022    ALT 16 05/31/2022    ALKPHOS 109 (H) 05/31/2022    EGFR 55 05/31/2022     Coagulation:   Lab Results   Component Value Date    INR 2 37 (H) 05/31/2022     Imaging: I have personally reviewed pertinent reports  EKG, Pathology, and Other Studies: I have personally reviewed pertinent reports  Code Status: Level 1 - Full Code  Advance Directive and Living Will:      Power of :    POLST:      Counseling / Coordination of Care  Total floor / unit time spent today 15 minutes  Greater than 50% of total time was spent with the patient and / or family counseling and / or coordination of care  A description of the counseling / coordination of care: reviewing pertinent diagnostic images and laboratory studies, evaluation of patient       Onofre Dumont PA-C

## 2022-05-31 NOTE — PROGRESS NOTES
Vancomycin Assessment    Ria Rushing is a 80 y o  female who is currently receiving vancomycin 2000mg IV Q12H for MRSA confirmed, skin-soft tissue infection     Relevant clinical data and objective history reviewed:  Creatinine   Date Value Ref Range Status   05/31/2022 1 03 0 60 - 1 30 mg/dL Final     Comment:     Standardized to IDMS reference method   05/30/2022 1 05 0 60 - 1 30 mg/dL Final     Comment:     Standardized to IDMS reference method   05/29/2022 0 95 0 60 - 1 30 mg/dL Final     Comment:     Standardized to IDMS reference method   05/19/2018 1 03 0 6 - 1 2 MG/DL Final     Comment:     IDMS method performed  The drugs Metamizole, Sulfasalazine, and Sulfapyridine may interfere and  result in false low results  Vancomycin Rm   Date Value Ref Range Status   05/12/2022 20 5 (H) 10 0 - 20 0 ug/mL Final     /66   Pulse 66   Temp 97 7 °F (36 5 °C)   Resp 18   Ht 5' 2" (1 575 m)   Wt (!) 201 kg (444 lb 3 6 oz)   LMP  (LMP Unknown)   SpO2 94%   BMI 81 25 kg/m²   I/O last 3 completed shifts: In: 720 [P O :720]  Out: 300 [Urine:300]  Lab Results   Component Value Date/Time    BUN 23 05/31/2022 04:46 AM    BUN 33 (H) 05/19/2018 06:15 PM    WBC 7 29 05/31/2022 04:46 AM    WBC 9 1 05/19/2018 06:15 PM    HGB 10 9 (L) 05/31/2022 04:46 AM    HGB 12 3 05/19/2018 06:15 PM    HCT 38 4 05/31/2022 04:46 AM    HCT 38 5 05/19/2018 06:15 PM    MCV 84 05/31/2022 04:46 AM    MCV 93 3 05/19/2018 06:15 PM     (H) 05/31/2022 04:46 AM     05/19/2018 06:15 PM     Temp Readings from Last 3 Encounters:   05/31/22 97 7 °F (36 5 °C)   05/13/22 97 7 °F (36 5 °C)   05/06/22 (!) 97 3 °F (36 3 °C)     Vancomycin Days of Therapy: 5    Assessment/Plan  The patient is currently on vancomycin utilizing scheduled dosing  Baseline risks associated with therapy include: concomitant nephrotoxic medications and advanced age    The patient is receiving 2000mg IV Q12H with the most recent vancomycin level being at steady-state and supratherapeutic based on a goal of 15-20 (appropriate for most indications) ; therefore, after clinical evaluation will be changed to 1500mg IV pulse dosing   Pharmacy will continue to follow closely for s/sx of nephrotoxicity, infusion reactions, and appropriateness of therapy  BMP and CBC will be ordered per protocol  Plan for trough as patient approaches steady state, prior to the other  dose at approximately 0800 on 06/01/22  Pharmacy will continue to follow the patients culture results and clinical progress daily      Estrada Cooper, Pharmacist

## 2022-05-31 NOTE — PLAN OF CARE
Problem: Potential for Falls  Goal: Patient will remain free of falls  Description: INTERVENTIONS:  - Educate patient/family on patient safety including physical limitations  - Instruct patient to call for assistance with activity   - Consult OT/PT to assist with strengthening/mobility   - Keep Call bell within reach  - Keep bed low and locked with side rails adjusted as appropriate  - Keep care items and personal belongings within reach  - Initiate and maintain comfort rounds  - Make Fall Risk Sign visible to staff  - Offer Toileting every 2 Hours, in advance of need  - Initiate/Maintain bed alarm  - Obtain necessary fall risk management equipment:   - Apply yellow socks and bracelet for high fall risk patients  - Consider moving patient to room near nurses station  Outcome: Progressing     Problem: PAIN - ADULT  Goal: Verbalizes/displays adequate comfort level or baseline comfort level  Description: Interventions:  - Encourage patient to monitor pain and request assistance  - Assess pain using appropriate pain scale  - Administer analgesics based on type and severity of pain and evaluate response  - Implement non-pharmacological measures as appropriate and evaluate response  - Consider cultural and social influences on pain and pain management  - Notify physician/advanced practitioner if interventions unsuccessful or patient reports new pain  Outcome: Progressing     Problem: INFECTION - ADULT  Goal: Absence or prevention of progression during hospitalization  Description: INTERVENTIONS:  - Assess and monitor for signs and symptoms of infection  - Monitor lab/diagnostic results  - Monitor all insertion sites, i e  indwelling lines, tubes, and drains  - Monitor endotracheal if appropriate and nasal secretions for changes in amount and color  - [place] appropriate cooling/warming therapies per order  - Administer medications as ordered  - Instruct and encourage patient and family to use good hand hygiene technique  - Identify and instruct in appropriate isolation precautions for identified infection/condition  Outcome: Progressing  Goal: Absence of fever/infection during neutropenic period  Description: INTERVENTIONS:  - Monitor WBC    Outcome: Progressing     Problem: SAFETY ADULT  Goal: Maintain or return to baseline ADL function  Description: INTERVENTIONS:  -  Assess patient's ability to carry out ADLs; assess patient's baseline for ADL function and identify physical deficits which impact ability to perform ADLs (bathing, care of mouth/teeth, toileting, grooming, dressing, etc )  - Assess/evaluate cause of self-care deficits   - Assess range of motion  - Assess patient's mobility; develop plan if impaired  - Assess patient's need for assistive devices and provide as appropriate  - Encourage maximum independence but intervene and supervise when necessary  - Involve family in performance of ADLs  - Assess for home care needs following discharge   - Consider OT consult to assist with ADL evaluation and planning for discharge  - Provide patient education as appropriate  Outcome: Progressing  Goal: Maintains/Returns to pre admission functional level  Description: INTERVENTIONS:  - Perform BMAT or MOVE assessment daily    - Set and communicate daily mobility goal to care team and patient/family/caregiver  - Collaborate with rehabilitation services on mobility goals if consulted  - Perform Range of Motion 3 times a day  - Reposition patient every 2 hours    - Out of bed for toileting  - Record patient progress and toleration of activity level   Outcome: Progressing     Problem: DISCHARGE PLANNING  Goal: Discharge to home or other facility with appropriate resources  Description: INTERVENTIONS:  - Identify barriers to discharge w/patient and caregiver  - Arrange for needed discharge resources and transportation as appropriate  - Identify discharge learning needs (meds, wound care, etc )  - Arrange for interpretive services to assist at discharge as needed  - Refer to Case Management Department for coordinating discharge planning if the patient needs post-hospital services based on physician/advanced practitioner order or complex needs related to functional status, cognitive ability, or social support system  Outcome: Progressing     Problem: Knowledge Deficit  Goal: Patient/family/caregiver demonstrates understanding of disease process, treatment plan, medications, and discharge instructions  Description: Complete learning assessment and assess knowledge base  Interventions:  - Provide teaching at level of understanding  - Provide teaching via preferred learning methods  Outcome: Progressing     Problem: MOBILITY - ADULT  Goal: Maintain or return to baseline ADL function  Description: INTERVENTIONS:  -  Assess patient's ability to carry out ADLs; assess patient's baseline for ADL function and identify physical deficits which impact ability to perform ADLs (bathing, care of mouth/teeth, toileting, grooming, dressing, etc )  - Assess/evaluate cause of self-care deficits   - Assess range of motion  - Assess patient's mobility; develop plan if impaired  - Assess patient's need for assistive devices and provide as appropriate  - Encourage maximum independence but intervene and supervise when necessary  - Involve family in performance of ADLs  - Assess for home care needs following discharge   - Consider OT consult to assist with ADL evaluation and planning for discharge  - Provide patient education as appropriate  Outcome: Progressing  Goal: Maintains/Returns to pre admission functional level  Description: INTERVENTIONS:  - Perform BMAT or MOVE assessment daily    - Set and communicate daily mobility goal to care team and patient/family/caregiver     - Collaborate with rehabilitation services on mobility goals if consulted    - Record patient progress and toleration of activity level   Outcome: Progressing

## 2022-05-31 NOTE — ASSESSMENT & PLAN NOTE
· Positive blood culture may be contaminant  · Appreciate infectious disease evaluation    · Continue vancomycin for panniculitis

## 2022-05-31 NOTE — PLAN OF CARE
Problem: INFECTION - ADULT  Goal: Absence or prevention of progression during hospitalization  Description: INTERVENTIONS:  - Assess and monitor for signs and symptoms of infection  - Monitor lab/diagnostic results  - Monitor all insertion sites, i e  indwelling lines, tubes, and drains  - Monitor endotracheal if appropriate and nasal secretions for changes in amount and color  - [place] appropriate cooling/warming therapies per order  - Administer medications as ordered  - Instruct and encourage patient and family to use good hand hygiene technique  - Identify and instruct in appropriate isolation precautions for identified infection/condition  Outcome: Progressing     Problem: PAIN - ADULT  Goal: Verbalizes/displays adequate comfort level or baseline comfort level  Description: Interventions:  - Encourage patient to monitor pain and request assistance  - Assess pain using appropriate pain scale  - Administer analgesics based on type and severity of pain and evaluate response  - Implement non-pharmacological measures as appropriate and evaluate response  - Consider cultural and social influences on pain and pain management  - Notify physician/advanced practitioner if interventions unsuccessful or patient reports new pain  Outcome: Progressing     Problem: SKIN/TISSUE INTEGRITY - ADULT  Goal: Pressure injury heals and does not worsen  Description: Interventions:  - Implement low air loss mattress or specialty surface (Criteria met)  - Apply silicone foam dressing  - Utilize friction reducing device or surface for transfers   - Consider nutrition services referral as needed  Outcome: Progressing     Problem: Knowledge Deficit  Goal: Patient/family/caregiver demonstrates understanding of disease process, treatment plan, medications, and discharge instructions  Description: Complete learning assessment and assess knowledge base    Interventions:  - Provide teaching at level of understanding  - Provide teaching via preferred learning methods  Outcome: Progressing     Problem: DISCHARGE PLANNING  Goal: Discharge to home or other facility with appropriate resources  Description: INTERVENTIONS:  - Identify barriers to discharge w/patient and caregiver  - Arrange for needed discharge resources and transportation as appropriate  - Identify discharge learning needs (meds, wound care, etc )  - Arrange for interpretive services to assist at discharge as needed  - Refer to Case Management Department for coordinating discharge planning if the patient needs post-hospital services based on physician/advanced practitioner order or complex needs related to functional status, cognitive ability, or social support system  Outcome: Progressing

## 2022-05-31 NOTE — ASSESSMENT & PLAN NOTE
· Stable at baseline    Results from last 7 days   Lab Units 05/31/22  0446 05/30/22  0427 05/29/22  0436 05/28/22  0608 05/27/22  2153   BUN mg/dL 23 21 22 23 25   CREATININE mg/dL 1 03 1 05 0 95 0 95 1 02   EGFR ml/min/1 73sq m 55 53 60 60 55

## 2022-05-31 NOTE — PROGRESS NOTES
Vancomycin Assessment    Joe Bush is a 80 y o  female who is currently receiving vancomycin 2000 mg iv q 12 hrs for skin-soft tissue infection     Relevant clinical data and objective history reviewed:  Creatinine   Date Value Ref Range Status   05/30/2022 1 05 0 60 - 1 30 mg/dL Final     Comment:     Standardized to IDMS reference method   05/29/2022 0 95 0 60 - 1 30 mg/dL Final     Comment:     Standardized to IDMS reference method   05/28/2022 0 95 0 60 - 1 30 mg/dL Final     Comment:     Standardized to IDMS reference method   05/19/2018 1 03 0 6 - 1 2 MG/DL Final     Comment:     IDMS method performed  The drugs Metamizole, Sulfasalazine, and Sulfapyridine may interfere and  result in false low results  Vancomycin Rm   Date Value Ref Range Status   05/12/2022 20 5 (H) 10 0 - 20 0 ug/mL Final     /68 (BP Location: Left arm)   Pulse 78   Temp (!) 97 3 °F (36 3 °C) (Temporal)   Resp 18   Ht 5' 2" (1 575 m)   Wt (!) 212 kg (467 lb)   LMP  (LMP Unknown)   SpO2 97%   BMI 85 42 kg/m²   I/O last 3 completed shifts: In: 840 [P O :840]  Out: 300 [Urine:300]  Lab Results   Component Value Date/Time    BUN 21 05/30/2022 04:27 AM    BUN 33 (H) 05/19/2018 06:15 PM    WBC 7 67 05/30/2022 04:27 AM    WBC 9 1 05/19/2018 06:15 PM    HGB 11 3 (L) 05/30/2022 04:27 AM    HGB 12 3 05/19/2018 06:15 PM    HCT 38 5 05/30/2022 04:27 AM    HCT 38 5 05/19/2018 06:15 PM    MCV 85 05/30/2022 04:27 AM    MCV 93 3 05/19/2018 06:15 PM     (H) 05/30/2022 04:27 AM     05/19/2018 06:15 PM     Temp Readings from Last 3 Encounters:   05/30/22 (!) 97 3 °F (36 3 °C) (Temporal)   05/13/22 97 7 °F (36 5 °C)   05/06/22 (!) 97 3 °F (36 3 °C)     Vancomycin Days of Therapy: 4    Assessment/Plan  The patient is currently on vancomycin utilizing scheduled dosing    Baseline risks associated with therapy include: concomitant nephrotoxic medications and advanced age  The patient is receiving 2000 mg iv q 12 hrs with the most recent vancomycin level being unknown (lab unable to process results)  and  pharmacy is unable to evaluate, based on a goal of 15-20, the trough will be re-drawn on 05/31/22 at approximately 0930  We will continue to follow the patients culture results and clinical progress daily      Julianne March, Pharmacist

## 2022-05-31 NOTE — CONSULTS
Consultation - Infectious Disease   Paulina Ramirez 80 y o  female MRN: [id number]  Unit/Bed#: -01 Encounter: [id number]      Inpatient consult to Infectious Diseases  Consult performed by: Carmela Delgado MD  Consult ordered by: Cresencio Corona DO            REQUIRED DOCUMENTATION:     1  This service was provided via Telemedicine  2  Provider located at [facility]  3  TeleMed provider: Carmela Delgado MD   4  Identify all parties in room with patient during tele consult:RN  5  After connecting through [employer], patient was identified by name and date of birth and assistant checked wristband  Patient was then informed that this was a Telemedicine visit and that the exam was being conducted confidentially over secure lines  My office door was closed  No one else was in the room  Patient acknowledged consent and understanding of privacy and security of the Telemedicine visit, and gave us permission to have the assistant stay in the room in order to assist with the history and to conduct the exam   I informed the patient that I have reviewed their record in Epic and presented the opportunity for them to ask any questions regarding the visit today  The patient agreed to participate         Assessment/Recommendations     1  Refractory left lower abdomen/hip cellulitis     - Refractory cellulitis in the setting of morbid obesity, chronic lymphedema, increased moisture and superficial skin breakdown  - Hx mrsa infection, consider gram negative cellulitis  - afebrile without leukocytosis but with persistent left anterior hip erythema, drainage     · Continue vanc, dosing per pharmacy  · Start cefepime 2 q12h  · Check wound cx  · Recommend ultrasound/consider surgery evaluation  · Serial exams  · Final choice and duration of antibiotics to be determined  · Continue aggressive skin care, minimize friction and moisture related skin breakdown   · Recommend frequent offloading, repositioning  · Recommend weight loss   · Discussed natural history of cellulitis and discussed with patient that she is at risk for recurrent cellulitis/bacteremia if underlying risk factors cannot be modified    2  Positive blood cx  - Bld cx previous admission both sets with MRSE, completed 7 days of iv vanc  - Now single set with MRSE  - TTE negative  - Do not suspect true bacteremia given isolated positive cx but harder to interpret given recent positive bld cx  Low suspicion for endovascular infection  · Continue vanc  · Repeat blood cx    3  Recurrent C difficile colitis     - No evidence of active colitis       · Continue p o  Vancomycin prophylaxis x 72 hours beyond systemic antibiotic      4  Morbid obesity  - Risk factor for recurrent infection and failure of oral antibiotics for cellulitis    5  Drug allergies  - Hives with penicillin    Thank you for involving me in the care of your patient  Please call with questions, change in clinical status or if tests recommended above are abnormal      Discussed with the primary service  History     Reason for Consult: Cellulitis  HPI: Lupe Car is a 80y o  year old female  with morbid obesity, hx recurrent C diff, history of panniculitis with MRSA and chronic lymphedema  She was recently admitted from 5/6 to 5/13 with bilateral panniculitis  She had slow clinical improvement on iv vanc and she was discharged to complete a 14 day course of po doxy/keflex which she completed on 4/20  Of note, admission blood cx grew MRSE bacteremia (both sets) but she completed a course of iv therapy  The patient presented to the ER on 5/27 with recurrent redness over her lower abdomen and worsening yellow appearing drainage  She has also had increased freuqnecy of loose stools  In the ER, vitals were stable   Labs were notable fo WBC of 10 68  EKG noted no acute ST changes  CT abdomen showed no focus of infection  Patient was admitted on vanc  Single bld cx has grown MRSE  Overnight has had no fever or leukocytosis  The patient currently has no complaints  Denies fevers, chills, or sweats  Denies nausea, vomiting, or diarrhea  Infectious disease is being consulted for diagnostic work up and antibiotic management  Review of Systems  Pertinent positives and negatives as noted in HPI  Rest complete 12 point system-based review of systems is otherwise negative      PAST MEDICAL HISTORY:  Past Medical History:   Diagnosis Date    Atrial fibrillation ([place] [address] )     Bladder stone     C  difficile colitis     Cellulitis     CHF (congestive heart failure) ([place] [address] )     Depression with anxiety     Disease of thyroid gland     Diverticulitis     Enterocolitis     History of abnormal cervical Pap smear     Kidney stone     Lymphedema     Menopause     Age 53    Morbid obesity with BMI of 70 and over, adult ([place] [address] )     MRSA (methicillin resistant Staphylococcus aureus)     abdominal wound    Osteoarthritis     Phlebitis     left lower leg    Spondylolysis      Past Surgical History:   Procedure Laterality Date    APPENDECTOMY      CARPAL TUNNEL RELEASE      CHOLECYSTECTOMY      CYSTOSCOPY      stent    FOOT SURGERY  1982    bone spur    KNEE SURGERY      WISDOM TOOTH EXTRACTION         FAMILY HISTORY:  Non-contributory    SOCIAL HISTORY:  Social History   Single  Social History     Substance and Sexual Activity   Alcohol Use Not Currently     Social History     Substance and Sexual Activity   Drug Use Not Currently    Comment: As a teen - As per [employer]      Social History     Tobacco Use   Smoking Status Former Smoker   Smokeless Tobacco Never Used   Tobacco Comment    5 packs/day until 5 (age 17-19) - As per [employer]        ALLERGIES:  Allergies   Allergen Reactions    Augmentin Es-600  [Amoxicillin-Pot Clavulanate]     Penicillins Other (See Comments)     Tolerates cefepime (11/18/21)    Sulfa Antibiotics Hives    Vitamin C [Ascorbate - Food Allergy]     Latex Rash and Edema       MEDICATIONS:  All current active medications have been reviewed      Physical Exam     Temp:  [97 1 °F (36 2 °C)-97 7 °F (36 5 °C)] 97 7 °F (36 5 °C)  HR:  [66-78] 66  Resp:  [18] 18  BP: (112-114)/(60-68) 112/66  SpO2:  [94 %-97 %] 94 %  Temp (24hrs), Av 4 °F (36 3 °C), Min:97 1 °F (36 2 °C), Max:97 7 °F (36 5 °C)  Current: Temperature: 97 7 °F (36 5 °C)    Intake/Output Summary (Last 24 hours) at 2022 1217  Last data filed at 2022 0750  Gross per 24 hour   Intake 480 ml   Output --   Net 480 ml         Physical exam findings reported by bedside and primary medical team staff    General Appearance:  Appearing well, nontoxic, and in no distress, appears stated age   Head:  Normocephalic, without obvious abnormality, atraumatic   Eyes:  PERRL, conjunctiva pink and sclera anicteric, both eyes   Nose: Nares normal, mucosa normal, no drainage   Throat: Oropharynx moist without lesions; lips, mucosa, and tongue normal; teeth and gums normal   Neck: Supple, symmetrical, trachea midline, no adenopathy, no tenderness/mass/nodules   Back:   Symmetric, no curvature, ROM normal, no CVA tenderness   Lungs:   Clear to auscultation bilaterally, no audible wheezes, rhonchi and rales, respirations unlabored   Chest Wall:  No tenderness or deformity   Heart:  Regular rate and rhythm, S1, S2 normal, no murmur, rub or gallop   Abdomen:   Soft, non-tender, non-distended, positive bowel sounds, no masses, no organomegaly    No CVA tenderness   Extremities: Extremities normal, atraumatic, no cyanosis, clubbing or edema   Skin: Superficial skin breakdown R pannus, left lower abd/anterior hip erythema with yellow draiange   Lymph nodes: Cervical, supraclavicular, and axillary nodes normal   Neurologic: Alert and oriented times 3       Invasive Devices:   Peripheral IV 22 Proximal;Right;Ventral (anterior) Forearm (Active)   Site Assessment WDL 22 1028   Dressing Type Transparent 22 1028 Line Status Infusing 05/30/22 1028   Dressing Status Clean;Dry; Intact 05/30/22 1028       Labs, Imaging, & Other Studies     Lab Results:    I have personally reviewed pertinent labs  Results from last 7 days   Lab Units 05/31/22  0446 05/30/22  0427 05/29/22  0436   WBC Thousand/uL 7 29 7 67 7 40   HEMOGLOBIN g/dL 10 9* 11 3* 10 5*   PLATELETS Thousands/uL 401* 414* 368     Results from last 7 days   Lab Units 05/31/22  0446 05/28/22  0608 05/27/22  2153   POTASSIUM mmol/L 3 8   < > 3 7   CHLORIDE mmol/L 100   < > 99   CO2 mmol/L 30   < > 29   BUN mg/dL 23   < > 25   CREATININE mg/dL 1 03   < > 1 02   EGFR ml/min/1 73sq m 55   < > 55   CALCIUM mg/dL 8 8   < > 8 5   AST U/L 22  --  16   ALT U/L 16  --  9   ALK PHOS U/L 109*  --  109*    < > = values in this interval not displayed  Results from last 7 days   Lab Units 05/27/22 2153 05/27/22 2042   BLOOD CULTURE  Staphylococcus coagulase negative* No Growth at 48 hrs  GRAM STAIN RESULT  Gram positive cocci in clusters*  --        Imaging Studies:   I have personally reviewed pertinent imaging study reports and images in PACS  EKG, Pathology, and Other Studies:   I have personally reviewed pertinent reports  Counseling/Coordination of care: Total 70 minutes communication with the patient via telehealth  Labs, medical tests and imaging studies were independently and extensively reviewed by me as noted above in HPI and old records were obtained and summarized as noted above in HPI  My recommendations were discussed with the patient in detail who verbalized understanding    A

## 2022-06-01 LAB
ALBUMIN SERPL BCP-MCNC: 2.8 G/DL (ref 3.5–5)
ALP SERPL-CCNC: 101 U/L (ref 34–104)
ALT SERPL W P-5'-P-CCNC: 16 U/L (ref 7–52)
ANION GAP SERPL CALCULATED.3IONS-SCNC: 8 MMOL/L (ref 4–13)
AST SERPL W P-5'-P-CCNC: 22 U/L (ref 13–39)
BACTERIA BLD CULT: ABNORMAL
BILIRUB SERPL-MCNC: 0.6 MG/DL (ref 0.2–1)
BUN SERPL-MCNC: 25 MG/DL (ref 5–25)
CALCIUM ALBUM COR SERPL-MCNC: 9.8 MG/DL (ref 8.3–10.1)
CALCIUM SERPL-MCNC: 8.8 MG/DL (ref 8.4–10.2)
CHLORIDE SERPL-SCNC: 99 MMOL/L (ref 96–108)
CO2 SERPL-SCNC: 28 MMOL/L (ref 21–32)
CREAT SERPL-MCNC: 1.08 MG/DL (ref 0.6–1.3)
ERYTHROCYTE [DISTWIDTH] IN BLOOD BY AUTOMATED COUNT: 16.7 % (ref 11.6–15.1)
GFR SERPL CREATININE-BSD FRML MDRD: 52 ML/MIN/1.73SQ M
GLUCOSE SERPL-MCNC: 148 MG/DL (ref 65–140)
GRAM STN SPEC: ABNORMAL
HCT VFR BLD AUTO: 36.3 % (ref 34.8–46.1)
HGB BLD-MCNC: 10.6 G/DL (ref 11.5–15.4)
INR PPP: 2.27 (ref 0.84–1.19)
MCH RBC QN AUTO: 24.5 PG (ref 26.8–34.3)
MCHC RBC AUTO-ENTMCNC: 29.2 G/DL (ref 31.4–37.4)
MCV RBC AUTO: 84 FL (ref 82–98)
MECA+MECC ISLT/SPM QL: DETECTED
PLATELET # BLD AUTO: 366 THOUSANDS/UL (ref 149–390)
PMV BLD AUTO: 8.6 FL (ref 8.9–12.7)
POTASSIUM SERPL-SCNC: 3.3 MMOL/L (ref 3.5–5.3)
PROT SERPL-MCNC: 6.9 G/DL (ref 6.4–8.4)
PROTHROMBIN TIME: 24.5 SECONDS (ref 11.6–14.5)
RBC # BLD AUTO: 4.33 MILLION/UL (ref 3.81–5.12)
S EPIDERMIDIS DNA BLD POS QL NAA+NON-PRB: DETECTED
SODIUM SERPL-SCNC: 135 MMOL/L (ref 135–147)
VANCOMYCIN SERPL-MCNC: 41.5 UG/ML (ref 10–20)
WBC # BLD AUTO: 8.78 THOUSAND/UL (ref 4.31–10.16)

## 2022-06-01 PROCEDURE — G0408 INPT/TELE FOLLOW UP 35: HCPCS | Performed by: INTERNAL MEDICINE

## 2022-06-01 PROCEDURE — 85610 PROTHROMBIN TIME: CPT | Performed by: NURSE PRACTITIONER

## 2022-06-01 PROCEDURE — 80053 COMPREHEN METABOLIC PANEL: CPT | Performed by: INTERNAL MEDICINE

## 2022-06-01 PROCEDURE — 99232 SBSQ HOSP IP/OBS MODERATE 35: CPT

## 2022-06-01 PROCEDURE — 85027 COMPLETE CBC AUTOMATED: CPT | Performed by: INTERNAL MEDICINE

## 2022-06-01 PROCEDURE — 80202 ASSAY OF VANCOMYCIN: CPT | Performed by: PHYSICIAN ASSISTANT

## 2022-06-01 PROCEDURE — 99232 SBSQ HOSP IP/OBS MODERATE 35: CPT | Performed by: INTERNAL MEDICINE

## 2022-06-01 RX ADMIN — WARFARIN SODIUM 2 MG: 2 TABLET ORAL at 17:00

## 2022-06-01 RX ADMIN — ALLOPURINOL 100 MG: 100 TABLET ORAL at 08:07

## 2022-06-01 RX ADMIN — Medication 125 MG: at 05:22

## 2022-06-01 RX ADMIN — Medication 250 MG: at 17:00

## 2022-06-01 RX ADMIN — Medication 125 MG: at 12:40

## 2022-06-01 RX ADMIN — ACETAMINOPHEN 650 MG: 325 TABLET ORAL at 02:03

## 2022-06-01 RX ADMIN — CEFEPIME HYDROCHLORIDE 2000 MG: 2 INJECTION, SOLUTION INTRAVENOUS at 00:59

## 2022-06-01 RX ADMIN — TRIAMCINOLONE ACETONIDE: 1 CREAM TOPICAL at 17:00

## 2022-06-01 RX ADMIN — CEFEPIME HYDROCHLORIDE 2000 MG: 2 INJECTION, SOLUTION INTRAVENOUS at 13:49

## 2022-06-01 RX ADMIN — FUROSEMIDE 40 MG: 10 INJECTION, SOLUTION INTRAMUSCULAR; INTRAVENOUS at 08:07

## 2022-06-01 RX ADMIN — FUROSEMIDE 40 MG: 10 INJECTION, SOLUTION INTRAMUSCULAR; INTRAVENOUS at 16:56

## 2022-06-01 RX ADMIN — Medication 125 MG: at 01:03

## 2022-06-01 RX ADMIN — TRIAMCINOLONE ACETONIDE: 1 CREAM TOPICAL at 08:12

## 2022-06-01 RX ADMIN — DULOXETINE 20 MG: 20 CAPSULE, DELAYED RELEASE ORAL at 08:07

## 2022-06-01 RX ADMIN — Medication 125 MG: at 17:34

## 2022-06-01 RX ADMIN — Medication 250 MG: at 08:07

## 2022-06-01 RX ADMIN — LEVOTHYROXINE SODIUM 125 MCG: 25 TABLET ORAL at 08:07

## 2022-06-01 NOTE — PLAN OF CARE
Problem: INFECTION - ADULT  Goal: Absence or prevention of progression during hospitalization  Description: INTERVENTIONS:  - Assess and monitor for signs and symptoms of infection  - Monitor lab/diagnostic results  - Monitor all insertion sites, i e  indwelling lines, tubes, and drains  - Monitor endotracheal if appropriate and nasal secretions for changes in amount and color  - [place] appropriate cooling/warming therapies per order  - Administer medications as ordered  - Instruct and encourage patient and family to use good hand hygiene technique  - Identify and instruct in appropriate isolation precautions for identified infection/condition  Outcome: Progressing     Problem: SKIN/TISSUE INTEGRITY - ADULT  Goal: Incision(s), wounds(s) or drain site(s) healing without S/S of infection  Description: INTERVENTIONS  - Assess and document dressing, incision, wound bed, drain sites and surrounding tissue  - Provide patient and family education  Outcome: Progressing     Problem: Prexisting or High Potential for Compromised Skin Integrity  Goal: Skin integrity is maintained or improved  Description: INTERVENTIONS:  - Identify patients at risk for skin breakdown  - Assess and monitor skin integrity  - Assess and monitor nutrition and hydration status  - Monitor labs   - Assess for incontinence   - Turn and reposition patient  - Assist with mobility/ambulation  - Relieve pressure over bony prominences  - Avoid friction and shearing  - Provide appropriate hygiene as needed including keeping skin clean and dry  - Evaluate need for skin moisturizer/barrier cream  - Collaborate with interdisciplinary team   - Patient/family teaching  - Consider wound care consult   Outcome: Progressing     Problem: Knowledge Deficit  Goal: Patient/family/caregiver demonstrates understanding of disease process, treatment plan, medications, and discharge instructions  Description: Complete learning assessment and assess knowledge base   Interventions:  - Provide teaching at level of understanding  - Provide teaching via preferred learning methods  Outcome: Progressing

## 2022-06-01 NOTE — PROGRESS NOTES
[facility] 46  Progress Note - Jorge Ser 1950, 80 y o  female MRN: [id number]  Unit/Bed#: -01 Encounter: [id number]  Primary Care Provider: ROACIO Sanches   Date and time admitted to hospital: 5/27/2022  7:52 PM    * Panniculitis  Assessment & Plan  13-year-old female with morbid obesity and diastolic CHF recently hospitalized at [facility] who re-presented for recurrent cellulitis of pannus  · Infectious disease consulted  · Given positive blood cultures currently on vancomycin (Pharmacy protocol currently not ordered awaiting return to therapeutic levels)  · Added cefepime per recommendations given growth of gram-negative rods  · Appreciate surgery evaluation; checked ultrasound which was negative for for abscess    Results from last 7 days   Lab Units 05/28/22  0608 05/27/22  2153   PROCALCITONIN ng/ml 0 06 0 06       Positive blood culture  Assessment & Plan  · Positive blood culture may be contaminant  · Does have history of MRSE bacteremia  · Appreciate infectious disease evaluation  · Continue vancomycin for panniculitis    Chronic atrial fibrillation ([facility] [address] )  Assessment & Plan  · Intrinsically controlled  · INR had been supratherapeutic now within limits    Warfarin has been restarted    Results from last 7 days   Lab Units 06/01/22  0434 05/31/22  0446 05/30/22  0427 05/29/22  0436 05/28/22  0608   INR  2 27* 2 37* 3 22* 3 91* 3 97*       History of Clostridioides difficile infection  Assessment & Plan  · Continue oral vancomycin and probiotics while on systemic antibiotics    Gout  Assessment & Plan  · Continue allopurinol    Depression with anxiety  Assessment & Plan  · Mood stable continue duloxetine    Stage 3a chronic kidney disease [facility])  Assessment & Plan  · Stable at baseline    Results from last 7 days   Lab Units 06/01/22  0434 05/31/22  0446 05/30/22  0427 05/29/22  0436 05/28/22  0608 05/27/22  2153   BUN mg/dL 25 23 21 22 23 25 CREATININE mg/dL 1 08 1 03 1 05 0 95 0 95 1 02   EGFR ml/min/1 73sq m 52 55 53 60 60 55       Lymphedema of extremity  Assessment & Plan  · Chronic lymphedema with panniculitis  · Continue IV furosemide to expedite diuresis    Chronic diastolic congestive heart failure [facility])  Assessment & Plan  Wt Readings from Last 3 Encounters:   06/01/22 (!) 202 kg (445 lb 8 8 oz)   05/08/22 (!) 212 kg (467 lb)   05/06/22 (!) 193 kg (425 lb)     · Continue IV diuresis for lower extremity swelling and panniculitis    Super-super obese (HCC)  Assessment & Plan  · Body mass index is 81 49 kg/m²  Other specified hypothyroidism  Assessment & Plan  · Continue levothyroxine      VTE Pharmacologic Prophylaxis: VTE Score: 6 High Risk (Score >/= 5) - Pharmacological DVT Prophylaxis Ordered: Warfarin (Coumadin)  Sequential Compression Devices Ordered  Patient Centered Rounds: I have performed bedside rounds with nursing staff today  Discussions with Specialists or Other Care Team Provider:  Infectious Disease and CM    Education and Discussions with Family / Patient:  Left voicemail with caretaker    Time Spent for Care: 25 mins  More than 50% of total time spent on counseling and coordination of care as described above  Current Length of Stay: 5 day(s)  Current Patient Status: Inpatient   Certification Statement: The patient will continue to require additional inpatient hospital stay due to IV antibiotics  Discharge Plan / Estimated Discharge Date: Anticipate discharge in 48-72 hrs to home with home services  Code Status: Level 1 - Full Code      Subjective:   Patient seen and examined  No new complaints  No diarrhea  Still working on getting a new caretaker as current one is out with back problems  Objective:   Vitals: Blood pressure 125/70, pulse 73, temperature (!) 97 °F (36 1 °C), temperature source Temporal, resp  rate 18, height 5' 2" (1 575 m), weight (!) 202 kg (445 lb 8 8 oz), SpO2 91 %      Physical Exam  Vitals reviewed  Constitutional:       General: She is not in acute distress  Appearance: Normal appearance  She is obese  HENT:      Head: Atraumatic  Cardiovascular:      Rate and Rhythm: Regular rhythm  Pulmonary:      Breath sounds: Decreased breath sounds present  No wheezing  Abdominal:      General: Bowel sounds are normal       Palpations: Abdomen is soft  Tenderness: There is no guarding or rebound  Comments: ++ erythema of pannus   Musculoskeletal:         General: Swelling (Lower extremities) present  Skin:     General: Skin is warm  Findings: Erythema present  Neurological:      General: No focal deficit present  Mental Status: She is alert and oriented to person, place, and time  Motor: No weakness     Psychiatric:         Mood and Affect: Mood normal        Additional Data:   Labs:  Results from last 7 days   Lab Units 06/01/22 0434 05/31/22 0446 05/30/22 0427 05/29/22 0436 05/28/22  0608 05/27/22  2042   WBC Thousand/uL 8 78 7 29 7 67 7 40 9 37 10 68*   HEMOGLOBIN g/dL 10 6* 10 9* 11 3* 10 5* 10 6* 11 6   HEMATOCRIT % 36 3 38 4 38 5 36 9 36 1 38 6   MCV fL 84 84 85 86 84 80*   PLATELETS Thousands/uL 366 401* 414* 368 390 459*   INR  2 27* 2 37* 3 22* 3 91* 3 97*  --      Results from last 7 days   Lab Units 06/01/22 0434 05/31/22 0446 05/30/22 0427 05/29/22 0436 05/28/22  0608 05/27/22  2153   SODIUM mmol/L 135 138 139 137 136 136   POTASSIUM mmol/L 3 3* 3 8 4 2 4 3 3 7 3 7   CHLORIDE mmol/L 99 100 100 100 103 99   CO2 mmol/L 28 30 30 29 29 29   ANION GAP mmol/L 8 8 9 8 4 8   BUN mg/dL 25 23 21 22 23 25   CREATININE mg/dL 1 08 1 03 1 05 0 95 0 95 1 02   CALCIUM mg/dL 8 8 8 8 9 0 8 8 8 5 8 5   ALBUMIN g/dL 2 8* 2 9*  --   --   --  2 8*   TOTAL BILIRUBIN mg/dL 0 60 0 52  --   --   --  0 68   ALK PHOS U/L 101 109*  --   --   --  109*   ALT U/L 16 16  --   --   --  9   AST U/L 22 22  --   --   --  16   EGFR ml/min/1 73sq m 52 55 53 60 60 55   GLUCOSE RANDOM mg/dL 148* 96 95 113 114 131                      Results from last 7 days   Lab Units 05/28/22  0608   PROCALCITONIN ng/ml 0 06                 * I Have Reviewed All Lab Data Listed Above  Cultures:   Results from last 7 days   Lab Units 05/31/22  1324 05/27/22  2153 05/27/22 2042   BLOOD CULTURE   --  Staphylococcus coagulase negative* No Growth at 72 hrs  GRAM STAIN RESULT  1+ Gram negative rods*  No polys seen* Gram positive cocci in clusters*  --    WOUND CULTURE  Culture too young- will reincubate  --   --                  Lines/Drains:  Invasive Devices  Report    Peripheral Intravenous Line  Duration           Peripheral IV 05/28/22 Proximal;Right;Ventral (anterior) Forearm 3 days              Telemetry:      Imaging:  Imaging Reports Reviewed Today Include:   US superficial lump (non extremity)    Result Date: 6/1/2022  Impression: Diffuse subcutaneous edema without organized fluid collection   Workstation performed: [id number]       Scheduled Meds:  Current Facility-Administered Medications   Medication Dose Route Frequency Provider Last Rate    acetaminophen  650 mg Oral Q4H PRN Lizandro Palms, PA-C      allopurinol  100 mg Oral Daily [place], [place]      cefepime  2,000 mg Intravenous Q12H Pérez Marquez, DO 2,000 mg (06/01/22 1349)    DULoxetine  20 mg Oral Daily [facility]      furosemide  40 mg Intravenous BID (diuretic) Lizandro Palms, PA-C      levothyroxine  125 mcg Oral Daily [place], [place]      ondansetron  4 mg Intravenous Q6H PRN Lizandro Palms, PA-C      saccharomyces boulardii  250 mg Oral BID [place], [place]      triamcinolone   Topical BID [place], [place]      [START ON 6/2/2022] vancomycin  12 5 mg/kg (Adjusted) Intravenous Once PRN Lizandro Palms, PA-C      [facility]com[facility]  [facility] [place] [place]      warfarin  5 mg Oral Every Other Day Alinesulaiman Hilario DO      And    warfarin  2 mg Oral Every Other Day Alinesulaiman Hilario, DO         Today, Patient Was Seen By: Aline Hilario DO    ** Please Note: Dictation voice to text software may have been used in the creation of this document   **

## 2022-06-01 NOTE — ASSESSMENT & PLAN NOTE
· Stable at baseline    Results from last 7 days   Lab Units 06/01/22  0434 05/31/22  0446 05/30/22  0427 05/29/22  0436 05/28/22  0608 05/27/22  2153   BUN mg/dL 25 23 21 22 23 25   CREATININE mg/dL 1 08 1 03 1 05 0 95 0 95 1 02   EGFR ml/min/1 73sq m 52 55 53 60 60 55

## 2022-06-01 NOTE — PROGRESS NOTES
Vancomycin Assessment    Brandon Dalton is a 80 y o  female who is currently receiving vancomycin 1500mg IV pulse dosing for MRSA confirmed, skin-soft tissue infection     Relevant clinical data and objective history reviewed:  Creatinine   Date Value Ref Range Status   06/01/2022 1 08 0 60 - 1 30 mg/dL Final     Comment:     Standardized to IDMS reference method   05/31/2022 1 03 0 60 - 1 30 mg/dL Final     Comment:     Standardized to IDMS reference method   05/30/2022 1 05 0 60 - 1 30 mg/dL Final     Comment:     Standardized to IDMS reference method   05/19/2018 1 03 0 6 - 1 2 MG/DL Final     Comment:     IDMS method performed  The drugs Metamizole, Sulfasalazine, and Sulfapyridine may interfere and  result in false low results  Vancomycin Rm   Date Value Ref Range Status   06/01/2022 41 5 (H) 10 0 - 20 0 ug/mL Final     /57   Pulse 67   Temp 98 2 °F (36 8 °C)   Resp 18   Ht 5' 2" (1 575 m)   Wt (!) 201 kg (444 lb 0 1 oz)   LMP  (LMP Unknown)   SpO2 94%   BMI 81 21 kg/m²   I/O last 3 completed shifts: In: 240 [P O :240]  Out: -   Lab Results   Component Value Date/Time    BUN 25 06/01/2022 04:34 AM    BUN 33 (H) 05/19/2018 06:15 PM    WBC 8 78 06/01/2022 04:34 AM    WBC 9 1 05/19/2018 06:15 PM    HGB 10 6 (L) 06/01/2022 04:34 AM    HGB 12 3 05/19/2018 06:15 PM    HCT 36 3 06/01/2022 04:34 AM    HCT 38 5 05/19/2018 06:15 PM    MCV 84 06/01/2022 04:34 AM    MCV 93 3 05/19/2018 06:15 PM     06/01/2022 04:34 AM     05/19/2018 06:15 PM     Temp Readings from Last 3 Encounters:   06/01/22 98 2 °F (36 8 °C)   05/13/22 97 7 °F (36 5 °C)   05/06/22 (!) 97 3 °F (36 3 °C)     Vancomycin Days of Therapy: 6    Assessment/Plan  The patient is currently on vancomycin utilizing pulse dosing  Baseline risks associated with therapy include: concomitant nephrotoxic medications and advanced age    The patient is receiving 1500mg IV pulse dosing with the most recent vancomycin level being at steady-state and supratherapeutic based on a goal of 15-20 (appropriate for most indications) ; therefore, is clinically appropriate and dose will be continued   Pharmacy will continue to follow closely for s/sx of nephrotoxicity, infusion reactions, and appropriateness of therapy  BMP and CBC will be ordered per protocol  Plan for trough as patient approaches steady state, prior to the other  dose at approximately 0800 on 06/02/2022  Pharmacy will continue to follow the patients culture results and clinical progress daily      Nelson Kearney, Pharmacist

## 2022-06-01 NOTE — PROGRESS NOTES
Progress Note - General Surgery   Dian Hunter 80 y o  female MRN: [id number]  Unit/Bed#: -01 Encounter: [id number]    Assessment:  67-year-old female presenting with panniculitis  Afebrile, other vital signs stable on room air   cc  WBC 8 78  HGB 10 6  CR 1 08  Hypokalemia 3 3  Wound culture 05/31/2022 positive for Gram-negative rods  US 05/31/2022 with diffuse subcutaneous edema and no evidence of underlying fluid collection    Plan:  No surgical intervention at this time as patient without underlying abscess  Continue local wound care  ID following appropriate antibiotic coverage, appreciate recommendations  Medical management per primary  Discussed with Dr Azra Dobson   Chief Complaint:  None    Subjective:  No acute overnight events  Patient is doing well this morning offers no acute complaints  Patient notes minimal increase in fluid from left lower abdomen  Right lower abdomen otherwise not draining  Tolerating diet abdominal pain, nausea, vomiting  Passing flatus and recent bowel movement  Denies chest pain, palpitations, shortness of breath, calf tenderness  Denies fever, chills  Objective:  Blood pressure 114/57, pulse 67, temperature 98 2 °F (36 8 °C), resp  rate 18, height 5' 2" (1 575 m), weight (!) 201 kg (444 lb 0 1 oz), SpO2 94 %  ,Body mass index is 81 21 kg/m²  No intake or output data in the 24 hours ending 06/01/22 0811    Invasive Devices  Report    Peripheral Intravenous Line  Duration           Peripheral IV 05/28/22 Proximal;Right;Ventral (anterior) Forearm 3 days              Physical Exam  Vitals and nursing note reviewed  Constitutional:       General: She is not in acute distress  Appearance: Normal appearance  She is obese  She is not ill-appearing or toxic-appearing  HENT:      Head: Normocephalic and atraumatic  Cardiovascular:      Rate and Rhythm: Normal rate  Rhythm irregular  Pulses: Normal pulses        Heart sounds: Normal heart sounds  No murmur heard  No friction rub  No gallop  Pulmonary:      Effort: Pulmonary effort is normal  No respiratory distress  Breath sounds: Normal breath sounds  No wheezing, rhonchi or rales  Abdominal:      General: Bowel sounds are normal  There is no distension  Palpations: Abdomen is soft  Tenderness: There is no abdominal tenderness  There is no guarding or rebound  Musculoskeletal:         General: Normal range of motion  Right lower leg: Edema present  Left lower leg: Edema present  Skin:     General: Skin is warm and dry  Capillary Refill: Capillary refill takes less than 2 seconds  Findings: Erythema present  Comments: Right-sided pannus with minimally improved erythema; left-sided pannus/hip with continued erythema, no drainage noted on exam   Neurological:      General: No focal deficit present  Mental Status: She is alert and oriented to person, place, and time  Psychiatric:         Mood and Affect: Mood normal          Behavior: Behavior normal          Thought Content: Thought content normal        Lab, Imaging and other studies:  I have personally reviewed pertinent lab results      CBC:   Lab Results   Component Value Date    WBC 8 78 06/01/2022    HGB 10 6 (L) 06/01/2022    HCT 36 3 06/01/2022    MCV 84 06/01/2022     06/01/2022    MCH 24 5 (L) 06/01/2022    MCHC 29 2 (L) 06/01/2022    RDW 16 7 (H) 06/01/2022    MPV 8 6 (L) 06/01/2022     CMP:   Lab Results   Component Value Date    SODIUM 135 06/01/2022    K 3 3 (L) 06/01/2022    CL 99 06/01/2022    CO2 28 06/01/2022    BUN 25 06/01/2022    CREATININE 1 08 06/01/2022    CALCIUM 8 8 06/01/2022    AST 22 06/01/2022    ALT 16 06/01/2022    ALKPHOS 101 06/01/2022    EGFR 52 06/01/2022     VTE Pharmacologic Prophylaxis: Warfarin (Coumadin)  VTE Mechanical Prophylaxis: sequential compression device    Mynor Calderón PA-C

## 2022-06-01 NOTE — PROGRESS NOTES
Vancomycin Assessment    Rafael Melgoza is a 80 y o  female who is currently receiving vancomycin 1500mg IV pulse dosing for MRSA confirmed, skin-soft tissue infection     Relevant clinical data and objective history reviewed:  Creatinine   Date Value Ref Range Status   06/01/2022 1 08 0 60 - 1 30 mg/dL Final     Comment:     Standardized to IDMS reference method   05/31/2022 1 03 0 60 - 1 30 mg/dL Final     Comment:     Standardized to IDMS reference method   05/30/2022 1 05 0 60 - 1 30 mg/dL Final     Comment:     Standardized to IDMS reference method   05/19/2018 1 03 0 6 - 1 2 MG/DL Final     Comment:     IDMS method performed  The drugs Metamizole, Sulfasalazine, and Sulfapyridine may interfere and  result in false low results  Vancomycin Rm   Date Value Ref Range Status   06/01/2022 41 5 (H) 10 0 - 20 0 ug/mL Final     /57   Pulse 67   Temp 98 2 °F (36 8 °C)   Resp 18   Ht 5' 2" (1 575 m)   Wt (!) 201 kg (444 lb 0 1 oz)   LMP  (LMP Unknown)   SpO2 94%   BMI 81 21 kg/m²   I/O last 3 completed shifts: In: 240 [P O :240]  Out: -   Lab Results   Component Value Date/Time    BUN 25 06/01/2022 04:34 AM    BUN 33 (H) 05/19/2018 06:15 PM    WBC 8 78 06/01/2022 04:34 AM    WBC 9 1 05/19/2018 06:15 PM    HGB 10 6 (L) 06/01/2022 04:34 AM    HGB 12 3 05/19/2018 06:15 PM    HCT 36 3 06/01/2022 04:34 AM    HCT 38 5 05/19/2018 06:15 PM    MCV 84 06/01/2022 04:34 AM    MCV 93 3 05/19/2018 06:15 PM     06/01/2022 04:34 AM     05/19/2018 06:15 PM     Temp Readings from Last 3 Encounters:   06/01/22 98 2 °F (36 8 °C)   05/13/22 97 7 °F (36 5 °C)   05/06/22 (!) 97 3 °F (36 3 °C)     Vancomycin Days of Therapy: 6    Assessment/Plan  The patient is currently on vancomycin utilizing pulse dosing  Baseline risks associated with therapy include: concomitant nephrotoxic medications and advanced age    The patient is receiving 1500mg IV pulse dosing with the most recent vancomycin level being at steady-state and supratherapeutic based on a goal of 15-20 (appropriate for most indications) ; therefore, is clinically appropriate and dose will be continued   Pharmacy will continue to follow closely for s/sx of nephrotoxicity, infusion reactions, and appropriateness of therapy  BMP and CBC will be ordered per protocol  Plan for trough as patient approaches steady state, prior to the other  dose at approximately 0800 on 06/02/2022  Pharmacy will continue to follow the patients culture results and clinical progress daily      Kindra Triana, Pharmacist

## 2022-06-01 NOTE — ASSESSMENT & PLAN NOTE
· Positive blood culture may be contaminant  · Does have history of MRSE bacteremia  · Appreciate infectious disease evaluation    · Continue vancomycin for panniculitis

## 2022-06-01 NOTE — ASSESSMENT & PLAN NOTE
52-year-old female with morbid obesity and diastolic CHF recently hospitalized at [facility] who re-presented for recurrent cellulitis of pannus  · Infectious disease consulted  · Given positive blood cultures currently on vancomycin (Pharmacy protocol currently not ordered awaiting return to therapeutic levels)  · Added cefepime per recommendations given growth of gram-negative rods  · Appreciate surgery evaluation; checked ultrasound which was negative for for abscess    Results from last 7 days   Lab Units 05/28/22  0608 05/27/22  2153   PROCALCITONIN ng/ml 0 06 0 06

## 2022-06-01 NOTE — ED PROVIDER NOTES
History  Chief Complaint   Patient presents with    Diarrhea     Was discharged from [facility] after being treated for cdiff and cellulitis  still  having diarrhea and it's getting worse     Patient is a 69-year-old female with history of morbid obesity, recurrent cellulitis, recurrent C diff that presents for evaluation of abdominal wall swelling/erythema  Patient says that she had 2 wounds that she was admitted for several days at [facility] for  Patient says that the wound on the right side of her abdomen improved but the left-sided wound has started to worsen  She has taken antibiotics as prescribed  Patient describes worsening redness, swelling, pain left side of her abdomen at the site of the wound  She says that it was straining significant amount of purulent fluid  Patient otherwise denies fevers, chills, nausea, vomiting, chest pain, dyspnea  She has been able to eat and drink despite her symptoms  Symptoms are acute on chronic, worsening, moderate this time  Prior to Admission Medications   Prescriptions Last Dose Informant Patient Reported? Taking?    DULoxetine (CYMBALTA) 20 mg capsule 5/28/2022 at Unknown time Self No Yes   Sig: Take 1 capsule (20 mg total) by mouth daily   acetaminophen (TYLENOL) 650 mg CR tablet 5/28/2022 at Unknown time Self Yes Yes   Sig: Take 650 mg by mouth every 8 (eight) hours   allopurinol (ZYLOPRIM) 100 mg tablet 5/28/2022 at Unknown time Self No Yes   Sig: Take 1 tablet (100 mg total) by mouth daily   furosemide (LASIX) 40 mg tablet Past Week at Unknown time Self No Yes   Sig: Take 1 tablet (40 mg total) by mouth daily   Patient taking differently: Take 20 mg by mouth daily as needed Prn weight gain 10 pounds   levothyroxine 125 mcg tablet 5/28/2022 at Unknown time Self Yes Yes   Sig: Take 125 mcg by mouth daily   triamcinolone (KENALOG) 0 1 % cream 5/28/2022 at Unknown time Self Yes Yes   Sig: Apply 0 1 application topically 2 (two) times a day warfarin (COUMADIN) 2 mg tablet Past Week at Unknown time  No Yes   Sig: Take 1 tablet (2 mg total) by mouth every other day Monday, Wednesday, Friday, sunday   warfarin (COUMADIN) 5 mg tablet Past Week at Unknown time  No Yes   Sig: Take 1 tablet (5 mg total) by mouth every other day Tuesday, Thursday, saturday      Facility-Administered Medications: None       Past Medical History:   Diagnosis Date    Atrial fibrillation ([place] [id number] )     Bladder stone     C  difficile colitis     Cellulitis     CHF (congestive heart failure) ([place] [address] )     Depression with anxiety     Disease of thyroid gland     Diverticulitis     Enterocolitis     History of abnormal cervical Pap smear     Kidney stone     Lymphedema     Menopause     Age 53    Morbid obesity with BMI of 70 and over, adult ([place] [address] )     MRSA (methicillin resistant Staphylococcus aureus)     abdominal wound    Osteoarthritis     Phlebitis     left lower leg    Spondylolysis        Past Surgical History:   Procedure Laterality Date    APPENDECTOMY      CARPAL TUNNEL RELEASE      CHOLECYSTECTOMY      CYSTOSCOPY      stent    FOOT SURGERY  1982    bone spur    KNEE SURGERY      WISDOM TOOTH EXTRACTION         Family History   Problem Relation Age of Onset    Heart failure Mother     Heart disease Mother     Lymphoma Mother     Kidney disease Father     Heart disease Father     Heart failure Father     Diabetes type II Father     Diabetes type II Sister     Diabetes type II Brother     Uterine cancer Paternal Aunt     Breast cancer Neg Hx     Colon cancer Neg Hx     Ovarian cancer Neg Hx      I have reviewed and agree with the history as documented      E-Cigarette/Vaping    E-Cigarette Use Never User      E-Cigarette/Vaping Substances    Nicotine No     THC No     CBD No     Flavoring No     Other No     Unknown No      Social History     Tobacco Use    Smoking status: Former Smoker    Smokeless tobacco: Never Used    Tobacco comment: 5 packs/day until 6 (age 16-19) - As per [employer]    Vaping Use    Vaping Use: Never used   Substance Use Topics    Alcohol use: Not Currently    Drug use: Not Currently     Comment: As a teen - As per [employer]        Review of Systems   Constitutional: Negative for fever  HENT: Negative for sore throat  Respiratory: Negative for shortness of breath  Cardiovascular: Negative for chest pain  Gastrointestinal: Negative for abdominal pain  Abdominal wound, drainage   Genitourinary: Negative for dysuria  Musculoskeletal: Negative for back pain  Skin: Negative for rash  Neurological: Negative for light-headedness  Psychiatric/Behavioral: Negative for agitation  All other systems reviewed and are negative  Physical Exam  Physical Exam  Vitals reviewed  Constitutional:       General: She is not in acute distress  Appearance: She is well-developed  HENT:      Head: Normocephalic  Eyes:      Pupils: Pupils are equal, round, and reactive to light  Cardiovascular:      Rate and Rhythm: Normal rate and regular rhythm  Heart sounds: Normal heart sounds  Pulmonary:      Effort: Pulmonary effort is normal       Breath sounds: Normal breath sounds  Abdominal:      General: Bowel sounds are normal  There is no distension  Palpations: Abdomen is soft  Tenderness: There is no abdominal tenderness  There is no guarding  Comments: Patient with area of cellulitis over left abdomen with some drainage noted  No abdominal tenderness otherwise   Musculoskeletal:         General: No tenderness or deformity  Normal range of motion  Cervical back: Normal range of motion and neck supple  Skin:     General: Skin is warm and dry  Capillary Refill: Capillary refill takes less than 2 seconds  Neurological:      Mental Status: She is alert and oriented to person, place, and time  Cranial Nerves: No cranial nerve deficit  Sensory: No sensory deficit  Psychiatric:         Behavior: Behavior normal          Thought Content:  Thought content normal          Judgment: Judgment normal          Vital Signs  ED Triage Vitals [05/27/22 1955]   Temperature Pulse Respirations Blood Pressure SpO2   97 6 °F (36 4 °C) 69 16 94/65 97 %      Temp Source Heart Rate Source Patient Position - Orthostatic VS BP Location FiO2 (%)   Tympanic Monitor Lying Right arm --      Pain Score       7           Vitals:    05/31/22 0750 05/31/22 1540 05/31/22 2324 06/01/22 0726   BP: 112/66 (!) 125/102 111/55 114/57   Pulse: 66 67 69 67   Patient Position - Orthostatic VS:  Lying           Visual Acuity  Visual Acuity    Flowsheet Row Most Recent Value   L Pupil Size (mm) 3   R Pupil Size (mm) 3   L Pupil Shape Round   R Pupil Shape Round          ED Medications  Medications   saccharomyces boulardii (FLORASTOR) capsule 250 mg (250 mg Oral Given 6/1/22 0807)   acetaminophen (TYLENOL) tablet 650 mg (650 mg Oral Given 6/1/22 0203)   ondansetron (ZOFRAN) injection 4 mg (has no administration in time range)   vancomycin (VANCOCIN) oral solution 125 mg (125 mg Oral Given 6/1/22 1240)   furosemide (LASIX) injection 40 mg (40 mg Intravenous Given 6/1/22 0807)   allopurinol (ZYLOPRIM) tablet 100 mg (100 mg Oral Given 6/1/22 0807)   DULoxetine (CYMBALTA) delayed release capsule 20 mg (20 mg Oral Given 6/1/22 0807)   levothyroxine tablet 125 mcg (125 mcg Oral Given 6/1/22 0807)   triamcinolone (KENALOG) 0 1 % cream ( Topical Given 6/1/22 0812)   cefepime (MAXIPIME) IVPB (premix in dextrose) 2,000 mg 50 mL (2,000 mg Intravenous New Bag 6/1/22 0059)   warfarin (COUMADIN) tablet 5 mg (5 mg Oral Given 5/31/22 1728)     And   warfarin (COUMADIN) tablet 2 mg (has no administration in time range)   vancomycin (VANCOCIN) 1500 mg in sodium chloride 0 9% 250 mL IVPB (has no administration in time range)   sodium chloride 0 9 % bolus 500 mL (0 mL Intravenous Stopped 5/27/22 2139)   vancomycin (VANCOCIN) 1,750 mg in sodium chloride 0 9 % 500 mL IVPB (0 mg/kg × 115 kg (Adjusted) Intravenous Stopped 5/28/22 0030)   cefepime (MAXIPIME) IVPB (premix in dextrose) 2,000 mg 50 mL (0 mg Intravenous Stopped 5/27/22 2125)       Diagnostic Studies  Results Reviewed     Procedure Component Value Units Date/Time    Blood culture #1 [[id number]]  (Abnormal) Collected: 05/27/22 2153    Lab Status: Final result Specimen: Blood from Arm, Left Updated: 06/01/22 0902     Blood Culture Staphylococcus coagulase negative     Gram Stain Result Gram positive cocci in clusters    Narrative:      Susceptibility testing will not be performed as this organism, when isolated from a single set of blood cultures, represents probable skin aristeo contamination  Please call the Microbiology Laboratory within 5 days at [phone] if further workup is required  Blood Culture Identification Panel [[id number]]  (Abnormal) Collected: 05/27/22 2153    Lab Status: Final result Specimen: Blood from Arm, Left Updated: 06/01/22 0902     Staphylococcus epidermidis Detected     mecA/C (Methicillin-resistance gene) Detected    Narrative:      Film Array panel tests for 11 gram positive organisms, 15 gram negative organisms, 7 yeast species and 10 resistance genes  Blood culture #2 [[id number]] Collected: 05/27/22 2042    Lab Status: Preliminary result Specimen: Blood from Line, Arterial Updated: 05/31/22 1604     Blood Culture No Growth at 72 hrs      Protime-INR [[id number]]  (Abnormal) Collected: 05/29/22 0436    Lab Status: Final result Specimen: Blood from Arm, Right Updated: 05/29/22 0544     Protime 37 1 seconds      INR 4 06    Basic metabolic panel [[id number]] Collected: 05/29/22 0436    Lab Status: Final result Specimen: Blood from Arm, Right Updated: 05/29/22 0541     Sodium 137 mmol/L      Potassium 4 3 mmol/L      Chloride 100 mmol/L      CO2 29 mmol/L      ANION GAP 8 mmol/L      BUN 22 mg/dL      Creatinine 0 95 mg/dL      Glucose 113 mg/dL Calcium 8 8 mg/dL      eGFR 60 ml/min/1 73sq m     Narrative:      National Kidney Disease Foundation guidelines for Chronic Kidney Disease (CKD):     Stage 1 with normal or high GFR (GFR > 90 mL/min/1 73 square meters)    Stage 2 Mild CKD (GFR = 60-89 mL/min/1 73 square meters)    Stage 3A Moderate CKD (GFR = 45-59 mL/min/1 73 square meters)    Stage 3B Moderate CKD (GFR = 30-44 mL/min/1 73 square meters)    Stage 4 Severe CKD (GFR = 15-29 mL/min/1 73 square meters)    Stage 5 End Stage CKD (GFR <15 mL/min/1 73 square meters)  Note: GFR calculation is accurate only with a steady state creatinine    CBC and differential [[id number]]  (Abnormal) Collected: 05/29/22 0436    Lab Status: Final result Specimen: Blood from Arm, Right Updated: 05/29/22 0518     WBC 7 40 Thousand/uL      RBC 4 31 Million/uL      Hemoglobin 10 5 g/dL      Hematocrit 36 9 %      MCV 86 fL      MCH 24 4 pg      MCHC 28 5 g/dL      RDW 16 8 %      MPV 9 5 fL      Platelets 280 Thousands/uL      nRBC 0 /100 WBCs      Neutrophils Relative 77 %      Immat GRANS % 0 %      Lymphocytes Relative 13 %      Monocytes Relative 6 %      Eosinophils Relative 3 %      Basophils Relative 1 %      Neutrophils Absolute 5 70 Thousands/µL      Immature Grans Absolute 0 03 Thousand/uL      Lymphocytes Absolute 0 94 Thousands/µL      Monocytes Absolute 0 46 Thousand/µL      Eosinophils Absolute 0 22 Thousand/µL      Basophils Absolute 0 05 Thousands/µL     Basic metabolic panel [[id number]] Collected: 05/28/22 5097    Lab Status: Final result Specimen: Blood from Arm, Left Updated: 05/28/22 7004     Sodium 136 mmol/L      Potassium 3 7 mmol/L      Chloride 103 mmol/L      CO2 29 mmol/L      ANION GAP 4 mmol/L      BUN 23 mg/dL      Creatinine 0 95 mg/dL      Glucose 114 mg/dL      Calcium 8 5 mg/dL      eGFR 60 ml/min/1 73sq m     Narrative:      [place] guidelines for Chronic Kidney Disease (CKD):     Stage 1 with normal or high GFR (GFR > 90 mL/min/1 73 square meters)    Stage 2 Mild CKD (GFR = 60-89 mL/min/1 73 square meters)    Stage 3A Moderate CKD (GFR = 45-59 mL/min/1 73 square meters)    Stage 3B Moderate CKD (GFR = 30-44 mL/min/1 73 square meters)    Stage 4 Severe CKD (GFR = 15-29 mL/min/1 73 square meters)    Stage 5 End Stage CKD (GFR <15 mL/min/1 73 square meters)  Note: GFR calculation is accurate only with a steady state creatinine    Procalcitonin [[id number]]  (Normal) Collected: 05/28/22 0608    Lab Status: Final result Specimen: Blood from Arm, Left Updated: 05/28/22 0650     Procalcitonin 0 06 ng/ml     Protime-INR [[id number]]  (Abnormal) Collected: 05/28/22 0608    Lab Status: Final result Specimen: Blood from Arm, Left Updated: 05/28/22 0642     Protime 37 5 seconds      INR 3 97    CBC (With Platelets) [[id number]]  (Abnormal) Collected: 05/28/22 0608    Lab Status: Final result Specimen: Blood from Arm, Left Updated: 05/28/22 0624     WBC 9 37 Thousand/uL      RBC 4 32 Million/uL      Hemoglobin 10 6 g/dL      Hematocrit 36 1 %      MCV 84 fL      MCH 24 5 pg      MCHC 29 4 g/dL      RDW 16 7 %      Platelets 596 Thousands/uL      MPV 8 6 fL     Procalcitonin [[id number]]  (Normal) Collected: 05/27/22 2153    Lab Status: Final result Specimen: Blood from Arm, Left Updated: 05/27/22 2348     Procalcitonin 0 06 ng/ml     Comprehensive metabolic panel [[id number]]  (Abnormal) Collected: 05/27/22 2153    Lab Status: Final result Specimen: Blood from Arm, Left Updated: 05/27/22 2228     Sodium 136 mmol/L      Potassium 3 7 mmol/L      Chloride 99 mmol/L      CO2 29 mmol/L      ANION GAP 8 mmol/L      BUN 25 mg/dL      Creatinine 1 02 mg/dL      Glucose 131 mg/dL      Calcium 8 5 mg/dL      Corrected Calcium 9 5 mg/dL      AST 16 U/L      ALT 9 U/L      Alkaline Phosphatase 109 U/L      Total Protein 7 2 g/dL      Albumin 2 8 g/dL      Total Bilirubin 0 68 mg/dL      eGFR 55 ml/min/1 73sq m     Narrative:      Consolidated [place] Kid[employer] Disease Foundation guidelines for Chronic Kidney Disease (CKD):     Stage 1 with normal or high GFR (GFR > 90 mL/min/1 73 square meters)    Stage 2 Mild CKD (GFR = 60-89 mL/min/1 73 square meters)    Stage 3A Moderate CKD (GFR = 45-59 mL/min/1 73 square meters)    Stage 3B Moderate CKD (GFR = 30-44 mL/min/1 73 square meters)    Stage 4 Severe CKD (GFR = 15-29 mL/min/1 73 square meters)    Stage 5 End Stage CKD (GFR <15 mL/min/1 73 square meters)  Note: GFR calculation is accurate only with a steady state creatinine    CBC and differential [[id number]]  (Abnormal) Collected: 05/27/22 2042    Lab Status: Final result Specimen: Blood from Arm, Left Updated: 05/27/22 2052     WBC 10 68 Thousand/uL      RBC 4 80 Million/uL      Hemoglobin 11 6 g/dL      Hematocrit 38 6 %      MCV 80 fL      MCH 24 2 pg      MCHC 30 1 g/dL      RDW 17 0 %      MPV 9 0 fL      Platelets 114 Thousands/uL      nRBC 0 /100 WBCs      Neutrophils Relative 80 %      Immat GRANS % 0 %      Lymphocytes Relative 11 %      Monocytes Relative 6 %      Eosinophils Relative 2 %      Basophils Relative 1 %      Neutrophils Absolute 8 55 Thousands/µL      Immature Grans Absolute 0 04 Thousand/uL      Lymphocytes Absolute 1 21 Thousands/µL      Monocytes Absolute 0 59 Thousand/µL      Eosinophils Absolute 0 22 Thousand/µL      Basophils Absolute 0 07 Thousands/µL                  US superficial lump (non extremity)   Final Result by Colleen Leahy MD (06/01 0507)      Diffuse subcutaneous edema without organized fluid collection  Workstation performed: [id number]                    Procedures  Procedures         ED Course                               SBIRT 20yo+    Flowsheet Row Most Recent Value   SBIRT (23 yo +)    In order to provide better care to our patients, we are screening all of our patients for alcohol and drug use  Would it be okay to ask you these screening questions?  No Filed at: 05/27/2022 2029   Initial Alcohol Screen: US AUDIT-C     1  How often do you have a drink containing alcohol? 0 Filed at: 05/27/2022 2029   2  How many drinks containing alcohol do you have on a typical day you are drinking? 0 Filed at: 05/27/2022 2029   3a  Male UNDER 65: How often do you have five or more drinks on one occasion? 0 Filed at: 05/27/2022 2029   3b  FEMALE Any Age, or MALE 65+: How often do you have 4 or more drinks on one occassion? 0 Filed at: 05/27/2022 2029   Audit-C Score 0 Filed at: 05/27/2022 2029   JYOTI: How many times in the past year have you    Used an illegal drug or used a prescription medication for non-medical reasons? Never Filed at: 05/27/2022 2029                    MDM  Number of Diagnoses or Management Options  Abdominal wall cellulitis  Diagnosis management comments: Patient is a 73-year-old female presents for evaluation of recurrent abdominal wall cellulitis  Patient will be started on antibiotics and admitted the hospital for further workup management  Disposition  Final diagnoses:   Abdominal wall cellulitis     Time reflects when diagnosis was documented in both MDM as applicable and the Disposition within this note     Time User Action Codes Description Comment    5/27/2022 10:54 PM [place] Add [L03 311] Abdominal wall cellulitis     5/27/2022 11:14 PM Meet BARBOZA Add [E66 9] Super obesity     5/27/2022 11:22 PM Meet BARBOZA Add [M79 3] Panniculitis     5/30/2022 10:18 AM Indu Cox Add [R78 81] Gram-positive bacteremia       ED Disposition     ED Disposition   Admit    Condition   Stable    Date/Time   Fri May 27, 2022 10:54 PM    Comment   Case was discussed with BRITANY and the patient's admission status was agreed to be Admission Status: inpatient status to the service of Dr Christie Klein              Follow-up Information    None         Current Discharge Medication List      CONTINUE these medications which have NOT CHANGED    Details   acetaminophen (TYLENOL) 650 mg CR tablet Take 650 mg by mouth every 8 (eight) hours      allopurinol (ZYLOPRIM) 100 mg tablet Take 1 tablet (100 mg total) by mouth daily  Qty: 30 tablet, Refills: 5    Associated Diagnoses: Chronic gout without tophus, unspecified cause, unspecified site      DULoxetine (CYMBALTA) 20 mg capsule Take 1 capsule (20 mg total) by mouth daily  Qty: 30 capsule, Refills: 5    Associated Diagnoses: Depression, unspecified depression type      furosemide (LASIX) 40 mg tablet Take 1 tablet (40 mg total) by mouth daily  Qty: 30 tablet, Refills: 5    Associated Diagnoses: Essential hypertension      levothyroxine 125 mcg tablet Take 125 mcg by mouth daily      triamcinolone (KENALOG) 0 1 % cream Apply 0 1 application topically 2 (two) times a day      !! warfarin (COUMADIN) 2 mg tablet Take 1 tablet (2 mg total) by mouth every other day Monday, Wednesday, Friday, sunday    Associated Diagnoses: Chronic anticoagulation      !! warfarin (COUMADIN) 5 mg tablet Take 1 tablet (5 mg total) by mouth every other day Tuesday, Thursday, saturday    Associated Diagnoses: Atrial fibrillation, unspecified type [facility])       ! ! - Potential duplicate medications found  Please discuss with provider  No discharge procedures on file      PDMP Review       Value Time User    PDMP Reviewed  Yes 5/27/2022 11:10 PM Anjelica Zhang PA-C          ED Provider  Electronically Signed by           Colette Hughes MD  06/01/22 0899

## 2022-06-01 NOTE — ASSESSMENT & PLAN NOTE
· Intrinsically controlled  · INR had been supratherapeutic now within limits    Warfarin has been restarted    Results from last 7 days   Lab Units 06/01/22  0434 05/31/22  0446 05/30/22  0427 05/29/22  0436 05/28/22  0608   INR  2 27* 2 37* 3 22* 3 91* 3 97*

## 2022-06-01 NOTE — ASSESSMENT & PLAN NOTE
Wt Readings from Last 3 Encounters:   06/01/22 (!) 202 kg (445 lb 8 8 oz)   05/08/22 (!) 212 kg (467 lb)   05/06/22 (!) 193 kg (425 lb)     · Continue IV diuresis for lower extremity swelling and panniculitis

## 2022-06-01 NOTE — PROGRESS NOTES
Progress Note - Infectious Disease   Aggie Joseph 80 y o  female MRN: [id number]  Unit/Bed#: -01 Encounter: [id number]        REQUIRED DOCUMENTATION:     1  This service was provided via Telemedicine  2  Provider located at [facility]  3  TeleMed provider: Jayy Thomas MD   4  Identify all parties in room with patient during tele consult:RN  5  After connecting through [employer], patient was identified by name and date of birth and assistant checked wristband  Patient was then informed that this was a Telemedicine visit and that the exam was being conducted confidentially over secure lines  My office door was closed  No one else was in the room  Patient acknowledged consent and understanding of privacy and security of the Telemedicine visit, and gave us permission to have the assistant stay in the room in order to assist with the history and to conduct the exam   I informed the patient that I have reviewed their record in Epic and presented the opportunity for them to ask any questions regarding the visit today  The patient agreed to participate         Assessment/Recommendations     1  Refractory cellulitis of left lower abdominal pannus   - Refractory cellulitis in the setting of morbid obesity, chronic lymphedema, increased moisture and superficial skin breakdown  - Hx mrsa infection, consider gram negative cellulitis  - US without abscess  - afebrile without leukocytosis but with persistent left anterior hip erythema, drainage     · Continue vanc, cefepime   · FU wound cx  · Serial exams to rule out evolving abscess  · Additional management per surgery  · Final choice and duration of antibiotics to be determined by cultures and clinical course  · Continue aggressive skin care, minimize friction and moisture related skin breakdown, frequent offloading, repositioning  · Discussed natural history of cellulitis and discussed with patient that she is at risk for recurrent cellulitis/bacteremia if underlying risk factors cannot be modified     2  Positive blood cx  - Bld cx previous admission both sets with MRSE, completed 7 days of iv vanc  - Now single set with MRSE  - TTE negative  - Do not suspect true bacteremia given isolated positive cx but harder to interpret given recent positive bld cx  Low suspicion for endovascular infection       · Continue vanc  · Repeat blood cx     3  Recurrent C difficile colitis     - No evidence of active colitis        · Continue p o  Vancomycin prophylaxis x 72 hours beyond systemic antibiotic      4  Morbid obesity  - Risk factor for recurrent infection and failure of oral antibiotics for cellulitis    ·  Continue supportive care, recommend outpatient evaluation for weight loss    5  Drug allergies  - Hives with penicillin    Discussed with the primary service  History       Subjective: The patient continues to have left lower abdominal pain, redness, drainage but no worsening  Denies fevers, chills, or sweats  Denies nausea, vomiting, or diarrhea      Antibiotics:  Vanc, cefepime      Physical Exam     Temp:  [97 5 °F (36 4 °C)-98 5 °F (36 9 °C)] 98 2 °F (36 8 °C)  HR:  [67-69] 67  Resp:  [] 18  BP: (111-125)/() 114/57  SpO2:  [94 %-97 %] 94 %  Temp (24hrs), Av 1 °F (36 7 °C), Min:97 5 °F (36 4 °C), Max:98 5 °F (36 9 °C)  Current: Temperature: 98 2 °F (36 8 °C)  No intake or output data in the 24 hours ending 22 0958    Physical exam findings reported by bedside and primary medical team staff      General Appearance:  Appearing chronically ill, nontoxic, and in no distress, appears stated age   Throat: Oropharynx moist without lesions; lips, mucosa, and tongue normal; teeth and gums normal   Lungs:   Clear to auscultation bilaterally, no audible wheezes, rhonchi and rales, respirations unlabored   Heart:  Regular rate and rhythm, S1, S2 normal, no murmur, rub or gallop   Abdomen:   Soft, non-tender, non-distended, positive bowel sounds, no masses, no organomegaly    No CVA tenderness   Extremities: Extremities normal, atraumatic, no cyanosis, clubbing or edema   Skin: Erythema, warmth under left lower abdominal pannus   Neurologic: Alert and oriented times 3         Invasive Devices:   Peripheral IV 05/28/22 Proximal;Right;Ventral (anterior) Forearm (Active)   Site Assessment WDL 05/31/22 1200   Dressing Type Transparent 05/30/22 1028   Line Status Infusing 05/30/22 1028   Dressing Status Clean;Dry; Intact 05/30/22 1028       Labs, Imaging, & Other Studies     Lab Results:    I have personally reviewed pertinent labs  Results from last 7 days   Lab Units 06/01/22  0434 05/31/22  0446 05/30/22  0427   WBC Thousand/uL 8 78 7 29 7 67   HEMOGLOBIN g/dL 10 6* 10 9* 11 3*   PLATELETS Thousands/uL 366 401* 414*     Results from last 7 days   Lab Units 06/01/22  0434 05/31/22  0446 05/28/22  0608 05/27/22  2153   POTASSIUM mmol/L 3 3* 3 8   < > 3 7   CHLORIDE mmol/L 99 100   < > 99   CO2 mmol/L 28 30   < > 29   BUN mg/dL 25 23   < > 25   CREATININE mg/dL 1 08 1 03   < > 1 02   EGFR ml/min/1 73sq m 52 55   < > 55   CALCIUM mg/dL 8 8 8 8   < > 8 5   AST U/L 22 22  --  16   ALT U/L 16 16  --  9   ALK PHOS U/L 101 109*  --  109*    < > = values in this interval not displayed  Results from last 7 days   Lab Units 05/31/22  1324 05/27/22  2153 05/27/22  2042   BLOOD CULTURE   --  Staphylococcus coagulase negative* No Growth at 72 hrs  GRAM STAIN RESULT  1+ Gram negative rods*  No polys seen* Gram positive cocci in clusters*  --        Imaging Studies:   I have personally reviewed pertinent imaging study reports and images in PACS  EKG, Pathology, and Other Studies:   I have personally reviewed pertinent reports  Counseling/Coordination of care: Total 35 minutes communication with the patient via telehealth  Labs, medical tests and imaging studies were independently reviewed by me as noted above

## 2022-06-01 NOTE — PLAN OF CARE
Problem: Potential for Falls  Goal: Patient will remain free of falls  Description: INTERVENTIONS:  - Educate patient/family on patient safety including physical limitations  - Instruct patient to call for assistance with activity   - Consult OT/PT to assist with strengthening/mobility   - Keep Call bell within reach  - Keep bed low and locked with side rails adjusted as appropriate  - Keep care items and personal belongings within reach  - Initiate and maintain comfort rounds  - Make Fall Risk Sign visible to staff  - Offer Toileting every 2 Hours, in advance of need  - Initiate/Maintain bedalarm  - Obtain necessary fall risk management equipment:   - Apply yellow socks and bracelet for high fall risk patients  - Consider moving patient to room near nurses station  Outcome: Progressing     Problem: PAIN - ADULT  Goal: Verbalizes/displays adequate comfort level or baseline comfort level  Description: Interventions:  - Encourage patient to monitor pain and request assistance  - Assess pain using appropriate pain scale  - Administer analgesics based on type and severity of pain and evaluate response  - Implement non-pharmacological measures as appropriate and evaluate response  - Consider cultural and social influences on pain and pain management  - Notify physician/advanced practitioner if interventions unsuccessful or patient reports new pain  Outcome: Progressing     Problem: INFECTION - ADULT  Goal: Absence or prevention of progression during hospitalization  Description: INTERVENTIONS:  - Assess and monitor for signs and symptoms of infection  - Monitor lab/diagnostic results  - Monitor all insertion sites, i e  indwelling lines, tubes, and drains  - Monitor endotracheal if appropriate and nasal secretions for changes in amount and color  - [place] appropriate cooling/warming therapies per order  - Administer medications as ordered  - Instruct and encourage patient and family to use good hand hygiene technique  - Identify and instruct in appropriate isolation precautions for identified infection/condition  Outcome: Progressing  Goal: Absence of fever/infection during neutropenic period  Description: INTERVENTIONS:  - Monitor WBC    Outcome: Progressing     Problem: SAFETY ADULT  Goal: Maintain or return to baseline ADL function  Description: INTERVENTIONS:  -  Assess patient's ability to carry out ADLs; assess patient's baseline for ADL function and identify physical deficits which impact ability to perform ADLs (bathing, care of mouth/teeth, toileting, grooming, dressing, etc )  - Assess/evaluate cause of self-care deficits   - Assess range of motion  - Assess patient's mobility; develop plan if impaired  - Assess patient's need for assistive devices and provide as appropriate  - Encourage maximum independence but intervene and supervise when necessary  - Involve family in performance of ADLs  - Assess for home care needs following discharge   - Consider OT consult to assist with ADL evaluation and planning for discharge  - Provide patient education as appropriate  Outcome: Progressing  Goal: Maintains/Returns to pre admission functional level  Description: INTERVENTIONS:  - Perform BMAT or MOVE assessment daily    - Set and communicate daily mobility goal to care team and patient/family/caregiver  - Collaborate with rehabilitation services on mobility goals if consulted  - Perform Range of Motion 3 times a day  - Reposition patient every 2 hours    - Out of bed for toileting  - Record patient progress and toleration of activity level   Outcome: Progressing     Problem: DISCHARGE PLANNING  Goal: Discharge to home or other facility with appropriate resources  Description: INTERVENTIONS:  - Identify barriers to discharge w/patient and caregiver  - Arrange for needed discharge resources and transportation as appropriate  - Identify discharge learning needs (meds, wound care, etc )  - Arrange for interpretive services to assist at discharge as needed  - Refer to Case Management Department for coordinating discharge planning if the patient needs post-hospital services based on physician/advanced practitioner order or complex needs related to functional status, cognitive ability, or social support system  Outcome: Progressing     Problem: Knowledge Deficit  Goal: Patient/family/caregiver demonstrates understanding of disease process, treatment plan, medications, and discharge instructions  Description: Complete learning assessment and assess knowledge base  Interventions:  - Provide teaching at level of understanding  - Provide teaching via preferred learning methods  Outcome: Progressing     Problem: MOBILITY - ADULT  Goal: Maintain or return to baseline ADL function  Description: INTERVENTIONS:  -  Assess patient's ability to carry out ADLs; assess patient's baseline for ADL function and identify physical deficits which impact ability to perform ADLs (bathing, care of mouth/teeth, toileting, grooming, dressing, etc )  - Assess/evaluate cause of self-care deficits   - Assess range of motion  - Assess patient's mobility; develop plan if impaired  - Assess patient's need for assistive devices and provide as appropriate  - Encourage maximum independence but intervene and supervise when necessary  - Involve family in performance of ADLs  - Assess for home care needs following discharge   - Consider OT consult to assist with ADL evaluation and planning for discharge  - Provide patient education as appropriate  Outcome: Progressing  Goal: Maintains/Returns to pre admission functional level  Description: INTERVENTIONS:  - Perform BMAT or MOVE assessment daily    - Set and communicate daily mobility goal to care team and patient/family/caregiver     - Collaborate with rehabilitation services on mobility goals if consulted  - Out of bed for toileting  - Record patient progress and toleration of activity level   Outcome: Progressing

## 2022-06-02 LAB
ALBUMIN SERPL BCP-MCNC: 2.9 G/DL (ref 3.5–5)
ALP SERPL-CCNC: 100 U/L (ref 34–104)
ALT SERPL W P-5'-P-CCNC: 16 U/L (ref 7–52)
ANION GAP SERPL CALCULATED.3IONS-SCNC: 10 MMOL/L (ref 4–13)
AST SERPL W P-5'-P-CCNC: 23 U/L (ref 13–39)
BACTERIA BLD CULT: NORMAL
BILIRUB SERPL-MCNC: 0.66 MG/DL (ref 0.2–1)
BUN SERPL-MCNC: 28 MG/DL (ref 5–25)
CALCIUM ALBUM COR SERPL-MCNC: 9.8 MG/DL (ref 8.3–10.1)
CALCIUM SERPL-MCNC: 8.9 MG/DL (ref 8.4–10.2)
CHLORIDE SERPL-SCNC: 97 MMOL/L (ref 96–108)
CO2 SERPL-SCNC: 30 MMOL/L (ref 21–32)
CREAT SERPL-MCNC: 1.21 MG/DL (ref 0.6–1.3)
ERYTHROCYTE [DISTWIDTH] IN BLOOD BY AUTOMATED COUNT: 16.7 % (ref 11.6–15.1)
GFR SERPL CREATININE-BSD FRML MDRD: 45 ML/MIN/1.73SQ M
GLUCOSE SERPL-MCNC: 131 MG/DL (ref 65–140)
HCT VFR BLD AUTO: 36.6 % (ref 34.8–46.1)
HGB BLD-MCNC: 10.8 G/DL (ref 11.5–15.4)
INR PPP: 2.14 (ref 0.84–1.19)
MCH RBC QN AUTO: 24.7 PG (ref 26.8–34.3)
MCHC RBC AUTO-ENTMCNC: 29.5 G/DL (ref 31.4–37.4)
MCV RBC AUTO: 84 FL (ref 82–98)
PLATELET # BLD AUTO: 374 THOUSANDS/UL (ref 149–390)
PMV BLD AUTO: 8.8 FL (ref 8.9–12.7)
POTASSIUM SERPL-SCNC: 3.6 MMOL/L (ref 3.5–5.3)
PROT SERPL-MCNC: 7.1 G/DL (ref 6.4–8.4)
PROTHROMBIN TIME: 23.4 SECONDS (ref 11.6–14.5)
RBC # BLD AUTO: 4.37 MILLION/UL (ref 3.81–5.12)
SODIUM SERPL-SCNC: 137 MMOL/L (ref 135–147)
VANCOMYCIN SERPL-MCNC: 34.4 UG/ML (ref 10–20)
WBC # BLD AUTO: 9.79 THOUSAND/UL (ref 4.31–10.16)

## 2022-06-02 PROCEDURE — 85610 PROTHROMBIN TIME: CPT | Performed by: INTERNAL MEDICINE

## 2022-06-02 PROCEDURE — 99232 SBSQ HOSP IP/OBS MODERATE 35: CPT

## 2022-06-02 PROCEDURE — 80202 ASSAY OF VANCOMYCIN: CPT | Performed by: INTERNAL MEDICINE

## 2022-06-02 PROCEDURE — 85027 COMPLETE CBC AUTOMATED: CPT | Performed by: INTERNAL MEDICINE

## 2022-06-02 PROCEDURE — 99232 SBSQ HOSP IP/OBS MODERATE 35: CPT | Performed by: PHYSICIAN ASSISTANT

## 2022-06-02 PROCEDURE — 80053 COMPREHEN METABOLIC PANEL: CPT | Performed by: INTERNAL MEDICINE

## 2022-06-02 PROCEDURE — G0408 INPT/TELE FOLLOW UP 35: HCPCS | Performed by: INTERNAL MEDICINE

## 2022-06-02 RX ADMIN — ALLOPURINOL 100 MG: 100 TABLET ORAL at 08:57

## 2022-06-02 RX ADMIN — Medication 125 MG: at 00:52

## 2022-06-02 RX ADMIN — FUROSEMIDE 40 MG: 10 INJECTION, SOLUTION INTRAMUSCULAR; INTRAVENOUS at 17:17

## 2022-06-02 RX ADMIN — TRIAMCINOLONE ACETONIDE: 1 CREAM TOPICAL at 08:56

## 2022-06-02 RX ADMIN — DULOXETINE 20 MG: 20 CAPSULE, DELAYED RELEASE ORAL at 08:57

## 2022-06-02 RX ADMIN — WARFARIN SODIUM 5 MG: 5 TABLET ORAL at 17:17

## 2022-06-02 RX ADMIN — TRIAMCINOLONE ACETONIDE: 1 CREAM TOPICAL at 22:35

## 2022-06-02 RX ADMIN — Medication 125 MG: at 05:25

## 2022-06-02 RX ADMIN — Medication 250 MG: at 08:57

## 2022-06-02 RX ADMIN — FUROSEMIDE 40 MG: 10 INJECTION, SOLUTION INTRAMUSCULAR; INTRAVENOUS at 08:56

## 2022-06-02 RX ADMIN — CEFEPIME HYDROCHLORIDE 2000 MG: 2 INJECTION, SOLUTION INTRAVENOUS at 13:34

## 2022-06-02 RX ADMIN — Medication 125 MG: at 17:18

## 2022-06-02 RX ADMIN — Medication 125 MG: at 23:18

## 2022-06-02 RX ADMIN — CEFEPIME HYDROCHLORIDE 2000 MG: 2 INJECTION, SOLUTION INTRAVENOUS at 00:52

## 2022-06-02 RX ADMIN — Medication 125 MG: at 12:34

## 2022-06-02 RX ADMIN — Medication 250 MG: at 17:17

## 2022-06-02 RX ADMIN — LEVOTHYROXINE SODIUM 125 MCG: 25 TABLET ORAL at 08:57

## 2022-06-02 NOTE — PROGRESS NOTES
Progress Note - General Surgery   Teresa Merchant 80 y o  female MRN: [id number]  Unit/Bed#: -01 Encounter: [id number]    Assessment:  80 y o  female presenting with panniculitis  AVSS, on room air    cc  WBC 9 79 form 8 78  Hgb 10 8 form 10 6  Cr 1 21  from 1 08  PT 23 4   INR 2 14  US 05/31/2022 without evidence of underlying abscess  Wound culture 05/31/2022 preliminary results with gram negative rods, required reincubation    Plan:  F/u wound cultures  Continue current management   Continue to monitor pannus for developing abscess formation   Antibiotics per ID, appreciate recommendations  To be evaluated by Dr Gwen Alcocer   Chief Complaint: Intermittent abdominal pain     Subjective: No acute overnight events  Patient is doing well this morning  Offers complaint of intermittent muscular abdominal pain  Otherwise offers no acute complaints  Decreased drainage noted from left lower abdomen/hip  Tolerating diet without abdominal pain, nausea, vomiting  Denies chest pain, palpitations, shortness of breath, calf tenderness  Denies fever, chills  Objective:   Blood pressure 107/66, pulse 67, temperature 97 7 °F (36 5 °C), resp  rate 18, height 5' 2" (1 575 m), weight (!) 202 kg (446 lb 6 9 oz), SpO2 95 %  ,Body mass index is 81 65 kg/m²  Intake/Output Summary (Last 24 hours) at 6/2/2022 0841  Last data filed at 6/1/2022 1700  Gross per 24 hour   Intake 240 ml   Output 300 ml   Net -60 ml       Invasive Devices  Report    Peripheral Intravenous Line  Duration           Peripheral IV 05/28/22 Proximal;Right;Ventral (anterior) Forearm 4 days              Physical Exam  Constitutional:       General: She is not in acute distress  Appearance: Normal appearance  She is obese  She is not ill-appearing or toxic-appearing  HENT:      Head: Normocephalic and atraumatic  Cardiovascular:      Rate and Rhythm: Normal rate  Pulses: Normal pulses        Heart sounds: Normal heart sounds  No murmur heard  No friction rub  No gallop  Pulmonary:      Effort: Pulmonary effort is normal  No respiratory distress  Breath sounds: Normal breath sounds  No wheezing, rhonchi or rales  Abdominal:      General: Bowel sounds are normal  There is no distension  Palpations: Abdomen is soft  Tenderness: There is no abdominal tenderness  There is no guarding or rebound  Musculoskeletal:      Right lower leg: Edema present  Left lower leg: Edema present  Skin:     General: Skin is warm and dry  Capillary Refill: Capillary refill takes less than 2 seconds  Findings: Erythema and lesion present  Comments: Right pannus with decreased erythema, no drainage noted; left pannus with erythema, scaling, and no significant drainage noted, overlying dressing with minimal amount of serous drainage   Neurological:      General: No focal deficit present  Mental Status: She is alert and oriented to person, place, and time  Psychiatric:         Mood and Affect: Mood normal          Behavior: Behavior normal          Thought Content: Thought content normal        Lab, Imaging and other studies:  I have personally reviewed pertinent lab results      CBC:   Lab Results   Component Value Date    WBC 9 79 06/02/2022    HGB 10 8 (L) 06/02/2022    HCT 36 6 06/02/2022    MCV 84 06/02/2022     06/02/2022    MCH 24 7 (L) 06/02/2022    MCHC 29 5 (L) 06/02/2022    RDW 16 7 (H) 06/02/2022    MPV 8 8 (L) 06/02/2022     CMP:   Lab Results   Component Value Date    SODIUM 137 06/02/2022    K 3 6 06/02/2022    CL 97 06/02/2022    CO2 30 06/02/2022    BUN 28 (H) 06/02/2022    CREATININE 1 21 06/02/2022    CALCIUM 8 9 06/02/2022    AST 23 06/02/2022    ALT 16 06/02/2022    ALKPHOS 100 06/02/2022    EGFR 45 06/02/2022     VTE Pharmacologic Prophylaxis: Warfarin (Coumadin)  VTE Mechanical Prophylaxis: sequential compression device    Hien Davenport PA-C

## 2022-06-02 NOTE — ASSESSMENT & PLAN NOTE
Wt Readings from Last 3 Encounters:   06/02/22 (!) 202 kg (446 lb 6 9 oz)   05/08/22 (!) 212 kg (467 lb)   05/06/22 (!) 193 kg (425 lb)     · Continue IV diuresis for lower extremity swelling and panniculitis

## 2022-06-02 NOTE — ASSESSMENT & PLAN NOTE
· Slight increase above baseline  Continue to monitor while on diuresis    Repeat BMP in AM     Results from last 7 days   Lab Units 06/02/22  0505 06/01/22  0434 05/31/22  0446 05/30/22  0427 05/29/22  0436 05/28/22  0608 05/27/22  2153   BUN mg/dL 28* 25 23 21 22 23 25   CREATININE mg/dL 1 21 1 08 1 03 1 05 0 95 0 95 1 02   EGFR ml/min/1 73sq m 45 52 55 53 60 60 55

## 2022-06-02 NOTE — ASSESSMENT & PLAN NOTE
· Positive blood culture may be contaminant as is coag  Negative staph  · Does have history of MRSE bacteremia  · Appreciate infectious disease evaluation    · Continue vancomycin for panniculitis  · Repeat blood cultures in the AM

## 2022-06-02 NOTE — ASSESSMENT & PLAN NOTE
· Intrinsically controlled  · INR had been supratherapeutic now within limits    Warfarin has been restarted    Results from last 7 days   Lab Units 06/02/22  0505 06/01/22  0434 05/31/22  0446 05/30/22  0427 05/29/22  0436 05/28/22  0608   INR  2 14* 2 27* 2 37* 3 22* 3 91* 3 97*

## 2022-06-02 NOTE — PROGRESS NOTES
Toby 46  Progress Note - Fili Shine 1950, 80 y o  female MRN: [id number]  Unit/Bed#: -01 Encounter: [id number]  Primary Care Provider: ORACIO Pineda   Date and time admitted to hospital: 5/27/2022  7:52 PM    * Panniculitis  Assessment & Plan  25-year-old female with morbid obesity and diastolic CHF recently hospitalized at [facility] who re-presented for recurrent cellulitis of pannus  · Infectious disease consulted and appreciate their input  · Given positive blood cultures currently on vancomycin (Pharmacy protocol currently not ordered awaiting return to therapeutic levels)  · Added cefepime per recommendations given growth of gram-negative rods  Continue antibiotics through 6/5/22  · Appreciate surgery evaluation; checked ultrasound which was negative for for abscess    Results from last 7 days   Lab Units 05/28/22  0608 05/27/22  2153   PROCALCITONIN ng/ml 0 06 0 06       Positive blood culture  Assessment & Plan  · Positive blood culture may be contaminant as is coag  Negative staph  · Does have history of MRSE bacteremia  · Appreciate infectious disease evaluation  · Continue vancomycin for panniculitis  · Repeat blood cultures in the AM     Chronic atrial fibrillation ([facility] [address] )  Assessment & Plan  · Intrinsically controlled  · INR had been supratherapeutic now within limits  Warfarin has been restarted    Results from last 7 days   Lab Units 06/02/22  0505 06/01/22  0434 05/31/22  0446 05/30/22  0427 05/29/22  0436 05/28/22  0608   INR  2 14* 2 27* 2 37* 3 22* 3 91* 3 97*       History of Clostridioides difficile infection  Assessment & Plan  · Continue oral vancomycin and probiotics while on systemic antibiotics    Depression with anxiety  Assessment & Plan  · Mood stable continue duloxetine    Stage 3a chronic kidney disease (HCC)  Assessment & Plan  · Slight increase above baseline  Continue to monitor while on diuresis    Repeat BMP in AM     Results from last 7 days   Lab Units 22  0505 22  0434 22  0446 22  0427 22  0436 22  0608 22  2153   BUN mg/dL 28* 25 23 21 22 23 25   CREATININE mg/dL 1 21 1 08 1 03 1 05 0 95 0 95 1 02   EGFR ml/min/1 73sq m 45 52 55 53 60 60 55       Lymphedema of extremity  Assessment & Plan  · Chronic lymphedema with panniculitis  · Continue IV furosemide to expedite diuresis    Chronic diastolic congestive heart failure [facility])  Assessment & Plan  Wt Readings from Last 3 Encounters:   22 (!) 202 kg (446 lb 6 9 oz)   22 (!) 212 kg (467 lb)   22 (!) 193 kg (425 lb)     · Continue IV diuresis for lower extremity swelling and panniculitis    Super-super obese (HCC)  Assessment & Plan  · Body mass index is 81 65 kg/m²  Other specified hypothyroidism  Assessment & Plan  · Continue levothyroxine    VTE Pharmacologic Prophylaxis: VTE Score: 6 High Risk (Score >/= 5) - Pharmacological DVT Prophylaxis Ordered: warfarin (Coumadin)  Sequential Compression Devices Ordered  Patient Centered Rounds: I performed bedside rounds with nursing staff today  Discussions with Specialists or Other Care Team Provider: ID    Education and Discussions with Family / Patient: Patient declined call to   Time Spent for Care: 30 minutes  More than 50% of total time spent on counseling and coordination of care as described above  Current Length of Stay: 6 day(s)  Current Patient Status: Inpatient   Certification Statement: The patient will continue to require additional inpatient hospital stay due to continued need for IV antibiotics  Discharge Plan: Anticipate discharge in >72 hrs to home with home services  Code Status: Level 1 - Full Code    Subjective:   Patient overall feels well with no current complaints      Objective:     Vitals:   Temp (24hrs), Av 5 °F (36 4 °C), Min:97 °F (36 1 °C), Max:97 8 °F (36 6 °C)    Temp:  [97 °F (36 1 °C)-97 8 °F (36 6 °C)] 97 8 °F (36 6 °C)  HR:  [62-70] 70  Resp:  [18] 18  BP: (107-136)/(66-76) 126/76  SpO2:  [90 %-96 %] 90 %  Body mass index is 81 65 kg/m²  Input and Output Summary (last 24 hours): Intake/Output Summary (Last 24 hours) at 6/2/2022 1719  Last data filed at 6/2/2022 1001  Gross per 24 hour   Intake 290 ml   Output --   Net 290 ml       Physical Exam:   Physical Exam  Vitals and nursing note reviewed  Constitutional:       General: She is not in acute distress  Appearance: She is well-developed  She is morbidly obese  HENT:      Head: Normocephalic and atraumatic  Eyes:      Conjunctiva/sclera: Conjunctivae normal    Cardiovascular:      Rate and Rhythm: Normal rate and regular rhythm  Heart sounds: No murmur heard  Pulmonary:      Effort: Pulmonary effort is normal  No respiratory distress  Breath sounds: Normal breath sounds  Abdominal:      Palpations: Abdomen is soft  Tenderness: There is no abdominal tenderness  Musculoskeletal:      Cervical back: Neck supple  Skin:     General: Skin is warm and dry  Neurological:      Mental Status: She is alert  Additional Data:     Labs:  Results from last 7 days   Lab Units 06/02/22  0505 05/31/22  0446 05/30/22  0427   WBC Thousand/uL 9 79   < > 7 67   HEMOGLOBIN g/dL 10 8*   < > 11 3*   HEMATOCRIT % 36 6   < > 38 5   PLATELETS Thousands/uL 374   < > 414*   NEUTROS PCT %  --   --  74   LYMPHS PCT %  --   --  14   MONOS PCT %  --   --  7   EOS PCT %  --   --  4    < > = values in this interval not displayed       Results from last 7 days   Lab Units 06/02/22  0505   SODIUM mmol/L 137   POTASSIUM mmol/L 3 6   CHLORIDE mmol/L 97   CO2 mmol/L 30   BUN mg/dL 28*   CREATININE mg/dL 1 21   ANION GAP mmol/L 10   CALCIUM mg/dL 8 9   ALBUMIN g/dL 2 9*   TOTAL BILIRUBIN mg/dL 0 66   ALK PHOS U/L 100   ALT U/L 16   AST U/L 23   GLUCOSE RANDOM mg/dL 131     Results from last 7 days   Lab Units 06/02/22  0505   INR  2 14*             Results from last 7 days   Lab Units 05/28/22  0608 05/27/22  2153   PROCALCITONIN ng/ml 0 06 0 06       Lines/Drains:  Invasive Devices  Report    Peripheral Intravenous Line  Duration           Peripheral IV 05/28/22 Proximal;Right;Ventral (anterior) Forearm 4 days                      Imaging: No pertinent imaging reviewed  Recent Cultures (last 7 days):   Results from last 7 days   Lab Units 05/31/22  1324 05/27/22  2153 05/27/22  2042   BLOOD CULTURE   --  Staphylococcus coagulase negative* No Growth After 5 Days  GRAM STAIN RESULT  1+ Gram negative rods*  No polys seen* Gram positive cocci in clusters*  --    WOUND CULTURE  3+ Growth of Pseudomonas aeruginosa*  2+ Growth of   --   --        Last 24 Hours Medication List:   Current Facility-Administered Medications   Medication Dose Route Frequency Provider Last Rate    acetaminophen  650 mg Oral Q4H PRN Ray Talamantes PA-C      allopurinol  100 mg Oral Daily [place], [place]      cefepime  2,000 mg Intravenous Q12H Pérez Marquez DO 2,000 mg (06/02/22 1334)    DULoxetine  20 mg Oral Daily [place] [facility]      furosemide  40 mg Intravenous BID (diuretic) Ray Talamantes PA-C      levothyroxine  125 mcg Oral Daily [place], [place]      ondansetron  4 mg Intravenous Q6H PRN Ray Talamantes PA-C      saccharomyces boulardii  250 mg Oral BID [place]      triamcinolone   Topical BID [place], [place]      [START ON 6/3/2022] vancomycin  12 5 mg/kg (Adjusted) Intravenous Once PRN Ray Talamantes PA-C      vancomycin  125 mg Oral HOS[facility] [place], [place]      warfarin  5 mg Oral Every Other Day Ross Mercedes, DO      And    warfarin  2 mg Oral Every Other Day Ross Mercedes DO          Today, Patient Was Seen By: Hawk Cespedes PA-C    **Please Note: This note may have been constructed using a voice recognition system  **

## 2022-06-02 NOTE — PLAN OF CARE
Problem: Prexisting or High Potential for Compromised Skin Integrity  Goal: Skin integrity is maintained or improved  Description: INTERVENTIONS:  - Identify patients at risk for skin breakdown  - Assess and monitor skin integrity  - Assess and monitor nutrition and hydration status  - Monitor labs   - Assess for incontinence   - Turn and reposition patient  - Assist with mobility/ambulation  - Relieve pressure over bony prominences  - Avoid friction and shearing  - Provide appropriate hygiene as needed including keeping skin clean and dry  - Evaluate need for skin moisturizer/barrier cream  - Collaborate with interdisciplinary team   - Patient/family teaching  - Consider wound care consult   Outcome: Progressing     Problem: INFECTION - ADULT  Goal: Absence or prevention of progression during hospitalization  Description: INTERVENTIONS:  - Assess and monitor for signs and symptoms of infection  - Monitor lab/diagnostic results  - Monitor all insertion sites, i e  indwelling lines, tubes, and drains  - Monitor endotracheal if appropriate and nasal secretions for changes in amount and color  - [place] appropriate cooling/warming therapies per order  - Administer medications as ordered  - Instruct and encourage patient and family to use good hand hygiene technique  - Identify and instruct in appropriate isolation precautions for identified infection/condition  Outcome: Progressing     Problem: Knowledge Deficit  Goal: Patient/family/caregiver demonstrates understanding of disease process, treatment plan, medications, and discharge instructions  Description: Complete learning assessment and assess knowledge base    Interventions:  - Provide teaching at level of understanding  - Provide teaching via preferred learning methods  Outcome: Progressing     Problem: DISCHARGE PLANNING  Goal: Discharge to home or other facility with appropriate resources  Description: INTERVENTIONS:  - Identify barriers to discharge w/patient and caregiver  - Arrange for needed discharge resources and transportation as appropriate  - Identify discharge learning needs (meds, wound care, etc )  - Arrange for interpretive services to assist at discharge as needed  - Refer to Case Management Department for coordinating discharge planning if the patient needs post-hospital services based on physician/advanced practitioner order or complex needs related to functional status, cognitive ability, or social support system  Outcome: Progressing

## 2022-06-02 NOTE — PROGRESS NOTES
Progress Note - Infectious Disease   Brandon Dalton 80 y o  female MRN: [id number]  Unit/Bed#: -01 Encounter: [id number]        REQUIRED DOCUMENTATION:     1  This service was provided via Telemedicine  2  Provider located at [facility]  3  TeleMed provider: Janey Aburto MD   4  Identify all parties in room with patient during tele consult:RN  5  After connecting through [employer], patient was identified by name and date of birth and assistant checked wristband  Patient was then informed that this was a Telemedicine visit and that the exam was being conducted confidentially over secure lines  My office door was closed  No one else was in the room  Patient acknowledged consent and understanding of privacy and security of the Telemedicine visit, and gave us permission to have the assistant stay in the room in order to assist with the history and to conduct the exam   I informed the patient that I have reviewed their record in Epic and presented the opportunity for them to ask any questions regarding the visit today  The patient agreed to participate  Assessment/Recommendations     1  Refractory cellulitis of left lower abdominal pannus   - Refractory cellulitis in the setting of morbid obesity, chronic lymphedema, increased moisture and superficial skin breakdown  - Wound cx with gram negative rods  - US without abscess  - afebrile without leukocytosis and decreasing erythema, drainage     · Continue vanc, cefepime   · FU wound cx  · Serial exams   · Additional management per surgery  · Anticipate completing 10-14 days of therapy, await final cx to determine antibiotics on discharge  · Continue aggressive skin care, minimize friction and moisture related skin breakdown, frequent offloading, repositioning  · Discussed natural history of cellulitis and discussed with patient that she is at risk for recurrent cellulitis/bacteremia if underlying risk factors cannot be modified     2  Positive blood cx  - Bld cx previous admission both sets with MRSE, completed 7 days of iv vanc  - Now single set with MRSE, no intravascular prosthetic devices or hardware  - TTE negative  - Do not suspect true bacteremia given isolated positive cx but harder to interpret given recent positive bld cx  Low suspicion for endovascular infection       · Continue vanc (pulse dosing at present for elevated trough levels)  · Repeat blood cx  · Anticipate completing 10 days of therapy through      3  Recurrent C difficile colitis     - No evidence of active colitis        · Continue p o  Vancomycin prophylaxis x 72 hours beyond systemic antibiotic      4  Morbid obesity  - Risk factor for recurrent infection and failure of oral antibiotics for cellulitis    ·  Continue supportive care, recommend outpatient evaluation for weight loss    5  Drug allergies  - Hives with penicillin    Discussed with the primary service  History       Subjective: The patient notes improvement in left lower abdominal pain, redness, drainage but no worsening  Denies fevers, chills, or sweats  Denies nausea, vomiting, or diarrhea      Antibiotics:  Vanc, cefepime      Physical Exam     Temp:  [97 °F (36 1 °C)-97 7 °F (36 5 °C)] 97 7 °F (36 5 °C)  HR:  [62-73] 67  Resp:  [18] 18  BP: (107-136)/(66-76) 112/68  SpO2:  [91 %-96 %] 95 %  Temp (24hrs), Av 2 °F (36 2 °C), Min:97 °F (36 1 °C), Max:97 7 °F (36 5 °C)  Current: Temperature: 97 7 °F (36 5 °C)    Intake/Output Summary (Last 24 hours) at 2022 1033  Last data filed at 2022 1001  Gross per 24 hour   Intake 530 ml   Output --   Net 530 ml       Physical exam findings reported by bedside and primary medical team staff      General Appearance:  Appearing chronically ill, nontoxic, and in no distress, appears stated age   Throat: Oropharynx moist without lesions; lips, mucosa, and tongue normal; teeth and gums normal   Lungs:   Clear to auscultation bilaterally, no audible wheezes, rhonchi and rales, respirations unlabored   Heart:  Regular rate and rhythm, S1, S2 normal, no murmur, rub or gallop   Abdomen:   Soft, non-tender, non-distended, positive bowel sounds, no masses, no organomegaly    No CVA tenderness   Extremities: Extremities normal, atraumatic, no cyanosis, clubbing or edema   Skin: Left lower abdomen wound dressed, not saturated, improvement in erythema   Neurologic: Alert and oriented times 3         Invasive Devices:   Peripheral IV 05/28/22 Proximal;Right;Ventral (anterior) Forearm (Active)   Site Assessment WDL 05/31/22 1200   Dressing Type Transparent 05/30/22 1028   Line Status Infusing 05/30/22 1028   Dressing Status Clean;Dry; Intact 05/30/22 1028       Labs, Imaging, & Other Studies     Lab Results:    I have personally reviewed pertinent labs  Results from last 7 days   Lab Units 06/02/22  0505 06/01/22  0434 05/31/22  0446   WBC Thousand/uL 9 79 8 78 7 29   HEMOGLOBIN g/dL 10 8* 10 6* 10 9*   PLATELETS Thousands/uL 374 366 401*     Results from last 7 days   Lab Units 06/02/22  0505 06/01/22  0434 05/31/22  0446   POTASSIUM mmol/L 3 6 3 3* 3 8   CHLORIDE mmol/L 97 99 100   CO2 mmol/L 30 28 30   BUN mg/dL 28* 25 23   CREATININE mg/dL 1 21 1 08 1 03   EGFR ml/min/1 73sq m 45 52 55   CALCIUM mg/dL 8 9 8 8 8 8   AST U/L 23 22 22   ALT U/L 16 16 16   ALK PHOS U/L 100 101 109*     Results from last 7 days   Lab Units 05/31/22  1324 05/27/22  2153 05/27/22  2042   BLOOD CULTURE   --  Staphylococcus coagulase negative* No Growth After 4 Days  GRAM STAIN RESULT  1+ Gram negative rods*  No polys seen* Gram positive cocci in clusters*  --    WOUND CULTURE  Culture too young- will reincubate  --   --        Imaging Studies:   I have personally reviewed pertinent imaging study reports and images in PACS  EKG, Pathology, and Other Studies:   I have personally reviewed pertinent reports  Counseling/Coordination of care:        Total 35 minutes communication with the patient via telehealth  Labs, medical tests and imaging studies were independently reviewed by me as noted above

## 2022-06-02 NOTE — PLAN OF CARE
Problem: Potential for Falls  Goal: Patient will remain free of falls  Description: INTERVENTIONS:  - Educate patient/family on patient safety including physical limitations  - Instruct patient to call for assistance with activity   - Consult OT/PT to assist with strengthening/mobility   - Keep Call bell within reach  - Keep bed low and locked with side rails adjusted as appropriate  - Keep care items and personal belongings within reach  - Initiate and maintain comfort rounds  - Make Fall Risk Sign visible to staff  - Offer Toileting every 2 Hours, in advance of need  - Initiate/Maintain bed alarm  - Obtain necessary fall risk management equipment:   - Apply yellow socks and bracelet for high fall risk patients  - Consider moving patient to room near nurses station  Outcome: Progressing     Problem: PAIN - ADULT  Goal: Verbalizes/displays adequate comfort level or baseline comfort level  Description: Interventions:  - Encourage patient to monitor pain and request assistance  - Assess pain using appropriate pain scale  - Administer analgesics based on type and severity of pain and evaluate response  - Implement non-pharmacological measures as appropriate and evaluate response  - Consider cultural and social influences on pain and pain management  - Notify physician/advanced practitioner if interventions unsuccessful or patient reports new pain  Outcome: Progressing     Problem: INFECTION - ADULT  Goal: Absence or prevention of progression during hospitalization  Description: INTERVENTIONS:  - Assess and monitor for signs and symptoms of infection  - Monitor lab/diagnostic results  - Monitor all insertion sites, i e  indwelling lines, tubes, and drains  - Monitor endotracheal if appropriate and nasal secretions for changes in amount and color  - [place] appropriate cooling/warming therapies per order  - Administer medications as ordered  - Instruct and encourage patient and family to use good hand hygiene technique  - Identify and instruct in appropriate isolation precautions for identified infection/condition  Outcome: Progressing  Goal: Absence of fever/infection during neutropenic period  Description: INTERVENTIONS:  - Monitor WBC    Outcome: Progressing     Problem: DISCHARGE PLANNING  Goal: Discharge to home or other facility with appropriate resources  Description: INTERVENTIONS:  - Identify barriers to discharge w/patient and caregiver  - Arrange for needed discharge resources and transportation as appropriate  - Identify discharge learning needs (meds, wound care, etc )  - Arrange for interpretive services to assist at discharge as needed  - Refer to Case Management Department for coordinating discharge planning if the patient needs post-hospital services based on physician/advanced practitioner order or complex needs related to functional status, cognitive ability, or social support system  Outcome: Progressing     Problem: Knowledge Deficit  Goal: Patient/family/caregiver demonstrates understanding of disease process, treatment plan, medications, and discharge instructions  Description: Complete learning assessment and assess knowledge base    Interventions:  - Provide teaching at level of understanding  - Provide teaching via preferred learning methods  Outcome: Progressing

## 2022-06-02 NOTE — PLAN OF CARE
Problem: Potential for Falls  Goal: Patient will remain free of falls  Description: INTERVENTIONS:  - Educate patient/family on patient safety including physical limitations  - Instruct patient to call for assistance with activity   - Consult OT/PT to assist with strengthening/mobility   - Keep Call bell within reach  - Keep bed low and locked with side rails adjusted as appropriate  - Keep care items and personal belongings within reach  - Initiate and maintain comfort rounds  - Make Fall Risk Sign visible to staff  - Offer Toileting every 2 Hours, in advance of need  - Initiate/Maintain bed alarm  - Obtain necessary fall risk management equipment: bed alarm  - Apply yellow socks and bracelet for high fall risk patients  - Consider moving patient to room near nurses station  Outcome: Progressing     Problem: PAIN - ADULT  Goal: Verbalizes/displays adequate comfort level or baseline comfort level  Description: Interventions:  - Encourage patient to monitor pain and request assistance  - Assess pain using appropriate pain scale  - Administer analgesics based on type and severity of pain and evaluate response  - Implement non-pharmacological measures as appropriate and evaluate response  - Consider cultural and social influences on pain and pain management  - Notify physician/advanced practitioner if interventions unsuccessful or patient reports new pain  Outcome: Progressing     Problem: INFECTION - ADULT  Goal: Absence or prevention of progression during hospitalization  Description: INTERVENTIONS:  - Assess and monitor for signs and symptoms of infection  - Monitor lab/diagnostic results  - Monitor all insertion sites, i e  indwelling lines, tubes, and drains  - Monitor endotracheal if appropriate and nasal secretions for changes in amount and color  - [place] appropriate cooling/warming therapies per order  - Administer medications as ordered  - Instruct and encourage patient and family to use good hand hygiene technique  - Identify and instruct in appropriate isolation precautions for identified infection/condition  Outcome: Progressing  Goal: Absence of fever/infection during neutropenic period  Description: INTERVENTIONS:  - Monitor WBC    Outcome: Progressing     Problem: SAFETY ADULT  Goal: Maintain or return to baseline ADL function  Description: INTERVENTIONS:  -  Assess patient's ability to carry out ADLs; assess patient's baseline for ADL function and identify physical deficits which impact ability to perform ADLs (bathing, care of mouth/teeth, toileting, grooming, dressing, etc )  - Assess/evaluate cause of self-care deficits   - Assess range of motion  - Assess patient's mobility; develop plan if impaired  - Assess patient's need for assistive devices and provide as appropriate  - Encourage maximum independence but intervene and supervise when necessary  - Involve family in performance of ADLs  - Assess for home care needs following discharge   - Consider OT consult to assist with ADL evaluation and planning for discharge  - Provide patient education as appropriate  Outcome: Progressing  Goal: Maintains/Returns to pre admission functional level  Description: INTERVENTIONS:  - Perform BMAT or MOVE assessment daily    - Set and communicate daily mobility goal to care team and patient/family/caregiver  - Collaborate with rehabilitation services on mobility goals if consulted  - Perform Range of Motion 3 times a day  - Reposition patient every 2 hours    - Dangle patient 3 times a day  - Stand patient 3 times a day  - Ambulate patient 3 times a day  - Out of bed to chair 3 times a day   - Out of bed for meals 3 times a day  - Out of bed for toileting  - Record patient progress and toleration of activity level   Outcome: Progressing     Problem: DISCHARGE PLANNING  Goal: Discharge to home or other facility with appropriate resources  Description: INTERVENTIONS:  - Identify barriers to discharge w/patient and caregiver  - Arrange for needed discharge resources and transportation as appropriate  - Identify discharge learning needs (meds, wound care, etc )  - Arrange for interpretive services to assist at discharge as needed  - Refer to Case Management Department for coordinating discharge planning if the patient needs post-hospital services based on physician/advanced practitioner order or complex needs related to functional status, cognitive ability, or social support system  Outcome: Progressing     Problem: Knowledge Deficit  Goal: Patient/family/caregiver demonstrates understanding of disease process, treatment plan, medications, and discharge instructions  Description: Complete learning assessment and assess knowledge base    Interventions:  - Provide teaching at level of understanding  - Provide teaching via preferred learning methods  Outcome: Progressing     Problem: SKIN/TISSUE INTEGRITY - ADULT  Goal: Skin Integrity remains intact(Skin Breakdown Prevention)  Description: Assess:  -Perform Gucci assessment every shift  -Clean and moisturize skin every 2 hours   -Inspect skin when repositioning, toileting, and assisting with ADLS  -Assess under medical devices such as  every   -Assess extremities for adequate circulation and sensation     Bed Management:  -Have minimal linens on bed & keep smooth, unwrinkled  -Change linens as needed when moist or perspiring  -Avoid sitting or lying in one position for more than 2 hours while in bed  -Keep HOB at 30degrees     Toileting:  -Offer bedside commode  -Assess for incontinence every 2 hours   -Use incontinent care products after each incontinent episode such as     Activity:  -Mobilize patient  times a day  -Encourage activity and walks on unit  -Encourage or provide ROM exercises   -Turn and reposition patient every  Hours  -Use appropriate equipment to lift or move patient in bed  -Instruct/ Assist with weight shifting every  when out of bed in chair  -Consider limitation of chair time  hour intervals    Skin Care:  -Avoid use of baby powder, tape, friction and shearing, hot water or constrictive clothing  -Relieve pressure over bony prominences using   -Do not massage red bony areas    Next Steps:  -Teach patient strategies to minimize risks such as    -Consider consults to  interdisciplinary teams such as   Outcome: Progressing  Goal: Incision(s), wounds(s) or drain site(s) healing without S/S of infection  Description: INTERVENTIONS  - Assess and document dressing, incision, wound bed, drain sites and surrounding tissue  - Provide patient and family education  - Perform skin care/dressing changes every   Outcome: Progressing  Goal: Pressure injury heals and does not worsen  Description: Interventions:  - Implement low air loss mattress or specialty surface (Criteria met)  - Apply silicone foam dressing  - Instruct/assist with weight shifting every  minutes when in chair   - Limit chair time to  hour intervals  - Use special pressure reducing interventions such as  when in chair   - Apply fecal or urinary incontinence containment device   - Perform passive or active ROM every   - Turn and reposition patient & offload bony prominences every  hours   - Utilize friction reducing device or surface for transfers   - Consider consults to  interdisciplinary teams such as   - Use incontinent care products after each incontinent episode such as   - Consider nutrition services referral as needed  Outcome: Progressing     Problem: MOBILITY - ADULT  Goal: Maintain or return to baseline ADL function  Description: INTERVENTIONS:  -  Assess patient's ability to carry out ADLs; assess patient's baseline for ADL function and identify physical deficits which impact ability to perform ADLs (bathing, care of mouth/teeth, toileting, grooming, dressing, etc )  - Assess/evaluate cause of self-care deficits   - Assess range of motion  - Assess patient's mobility; develop plan if impaired  - Assess patient's need for assistive devices and provide as appropriate  - Encourage maximum independence but intervene and supervise when necessary  - Involve family in performance of ADLs  - Assess for home care needs following discharge   - Consider OT consult to assist with ADL evaluation and planning for discharge  - Provide patient education as appropriate  Outcome: Progressing  Goal: Maintains/Returns to pre admission functional level  Description: INTERVENTIONS:  - Perform BMAT or MOVE assessment daily    - Set and communicate daily mobility goal to care team and patient/family/caregiver  - Collaborate with rehabilitation services on mobility goals if consulted  - Perform Range of Motion 3 times a day  - Reposition patient every 2 hours    - Dangle patient 3 times a day  - Stand patient 3 times a day  - Ambulate patient 3 times a day  - Out of bed to chair 3 times a day   - Out of bed for meals 3 times a day  - Out of bed for toileting  - Record patient progress and toleration of activity level   Outcome: Progressing     Problem: Prexisting or High Potential for Compromised Skin Integrity  Goal: Skin integrity is maintained or improved  Description: INTERVENTIONS:  - Identify patients at risk for skin breakdown  - Assess and monitor skin integrity  - Assess and monitor nutrition and hydration status  - Monitor labs   - Assess for incontinence   - Turn and reposition patient  - Assist with mobility/ambulation  - Relieve pressure over bony prominences  - Avoid friction and shearing  - Provide appropriate hygiene as needed including keeping skin clean and dry  - Evaluate need for skin moisturizer/barrier cream  - Collaborate with interdisciplinary team   - Patient/family teaching  - Consider wound care consult   Outcome: Progressing

## 2022-06-02 NOTE — ASSESSMENT & PLAN NOTE
71-year-old female with morbid obesity and diastolic CHF recently hospitalized at [facility] who re-presented for recurrent cellulitis of pannus  · Infectious disease consulted and appreciate their input  · Given positive blood cultures currently on vancomycin (Pharmacy protocol currently not ordered awaiting return to therapeutic levels)  · Added cefepime per recommendations given growth of gram-negative rods    Continue antibiotics through 6/5/22  · Appreciate surgery evaluation; checked ultrasound which was negative for for abscess    Results from last 7 days   Lab Units 05/28/22  0608 05/27/22  2153   PROCALCITONIN ng/ml 0 06 0 06

## 2022-06-02 NOTE — CASE MANAGEMENT
Case Management Discharge Planning Note    Patient name Heath Mace  Location [address] [id number] MRN [id number]  : 1950 Date 2022       Current Admission Date: 2022  Current Admission Diagnosis:Panniculitis   Patient Active Problem List    Diagnosis Date Noted    Positive blood culture 2022    Chronic atrial fibrillation ([facility] [address] ) 2022    C  difficile colitis 2022    History of Clostridioides difficile infection 2022    Lymphedema of extremity 2021    Other specified hypothyroidism 10/03/2020    Mild episode of recurrent major depressive disorder ([facility] [address] ) 10/03/2020    Idiopathic chronic 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 10/03/2020    Primary osteoarthritis involving multiple joints 10/03/2020    Super-super obese ([facility] [facility] ) 10/03/2020    Chronic diastolic congestive heart failure ([facility] [address] [facility] ) 10/03/2020    Panniculitis 10/03/2020    Gastroesophageal reflux disease without esophagitis 10/03/2020    Hx MRSA infection 2020    Left ovarian cyst 2017    Depression with anxiety 07/15/2017    Anemia 2016    Ambulatory dysfunction 2016    Stage 3a chronic kidney disease ([facility] [address] ) 2016    Adjustment disorder 2013    Irritable bowel syndrome 2012    Left atrial enlargement 2012    Left ventricular hypertrophy 2012    Carpal tunnel syndrome 2012    Gout 2012    Hyperlipidemia 2012    Osteoarthritis 2012    Symptomatic menopausal or female climacteric states 2012      LOS (days): 6  Geometric Mean LOS (GMLOS) (days): 3 20  Days to GMLOS:-2 4     OBJECTIVE:  Risk of Unplanned Readmission Score: 34 35         Current admission status: Inpatient   Preferred Pharmacy:    [address]  [address] [address]  Phone: [phone] Fax: [phone] [address] [place] [id number]  Phone: [phone] Fax: [phone]    Primary Care Provider: ORACIO Bahena    Primary Insurance: MEDICARE  Secondary Insurance: AARP    DISCHARGE DETAILS:  CM met with pt at bedside to discuss discharge planning  Pt reports her CG through [facility] is with her for 12 hours per day 7 days per week through the [employer] waiver program  However, her CG is currently ill and will be out of work for at least one week  Pt states she has spoken with Job Aparicio her  through [facility] to attempt to set up another CG when she is discharged  Per pt no other CGS are available at this time  CM left message for Job Aparicio [phone] and Anuel Flores [phone] ext  137 pts waiver  to update and see if any other CG was available

## 2022-06-02 NOTE — PROGRESS NOTES
Vancomycin Assessment    Diane Chaudhary is a 80 y o  female who is currently receiving vancomycin 1500mg IV pulse dosing when trough <20 for skin-soft tissue infection, MRSA confirmed     Relevant clinical data and objective history reviewed:  Creatinine   Date Value Ref Range Status   06/02/2022 1 21 0 60 - 1 30 mg/dL Final     Comment:     Standardized to IDMS reference method   06/01/2022 1 08 0 60 - 1 30 mg/dL Final     Comment:     Standardized to IDMS reference method   05/31/2022 1 03 0 60 - 1 30 mg/dL Final     Comment:     Standardized to IDMS reference method   05/19/2018 1 03 0 6 - 1 2 MG/DL Final     Comment:     IDMS method performed  The drugs Metamizole, Sulfasalazine, and Sulfapyridine may interfere and  result in false low results  Vancomycin Rm   Date Value Ref Range Status   06/02/2022 34 4 (H) 10 0 - 20 0 ug/mL Final     /66   Pulse 67   Temp 97 7 °F (36 5 °C)   Resp 18   Ht 5' 2" (1 575 m)   Wt (!) 202 kg (446 lb 6 9 oz)   LMP  (LMP Unknown)   SpO2 95%   BMI 81 65 kg/m²   I/O last 3 completed shifts: In: 240 [P O :240]  Out: 300 [Urine:300]  Lab Results   Component Value Date/Time    BUN 28 (H) 06/02/2022 05:05 AM    BUN 33 (H) 05/19/2018 06:15 PM    WBC 9 79 06/02/2022 05:05 AM    WBC 9 1 05/19/2018 06:15 PM    HGB 10 8 (L) 06/02/2022 05:05 AM    HGB 12 3 05/19/2018 06:15 PM    HCT 36 6 06/02/2022 05:05 AM    HCT 38 5 05/19/2018 06:15 PM    MCV 84 06/02/2022 05:05 AM    MCV 93 3 05/19/2018 06:15 PM     06/02/2022 05:05 AM     05/19/2018 06:15 PM     Temp Readings from Last 3 Encounters:   06/02/22 97 7 °F (36 5 °C)   05/13/22 97 7 °F (36 5 °C)   05/06/22 (!) 97 3 °F (36 3 °C)     Vancomycin Days of Therapy: 7    Assessment/Plan  The patient is currently on vancomycin utilizing pulse dosing    Baseline risks associated with therapy include: pre-existing renal impairment, concomitant nephrotoxic medications, and advanced age  The patient is receiving 1500mg IV pulse dosing when trough <20 with the most recent vancomycin level being at steady-state and supratherapeutic based on a goal of 15-20 (appropriate for most indications) ; therefore, is clinically appropriate and dose will be continued   Pharmacy will continue to follow closely for s/sx of nephrotoxicity, infusion reactions, and appropriateness of therapy  BMP and CBC will be ordered per protocol  Plan for trough as patient approaches steady state, prior to the other  dose at approximately 0600 on 6/3  Pharmacy will continue to follow the patients culture results and clinical progress daily      Claudine Triplett, Pharmacist

## 2022-06-03 LAB
ANION GAP SERPL CALCULATED.3IONS-SCNC: 11 MMOL/L (ref 4–13)
BUN SERPL-MCNC: 28 MG/DL (ref 5–25)
CALCIUM SERPL-MCNC: 9 MG/DL (ref 8.4–10.2)
CHLORIDE SERPL-SCNC: 96 MMOL/L (ref 96–108)
CO2 SERPL-SCNC: 28 MMOL/L (ref 21–32)
CREAT SERPL-MCNC: 1.11 MG/DL (ref 0.6–1.3)
GFR SERPL CREATININE-BSD FRML MDRD: 50 ML/MIN/1.73SQ M
GLUCOSE SERPL-MCNC: 108 MG/DL (ref 65–140)
INR PPP: 2.21 (ref 0.84–1.19)
POTASSIUM SERPL-SCNC: 3.4 MMOL/L (ref 3.5–5.3)
PROTHROMBIN TIME: 24 SECONDS (ref 11.6–14.5)
SODIUM SERPL-SCNC: 135 MMOL/L (ref 135–147)
VANCOMYCIN SERPL-MCNC: 28 UG/ML (ref 10–20)

## 2022-06-03 PROCEDURE — 80048 BASIC METABOLIC PNL TOTAL CA: CPT | Performed by: PHYSICIAN ASSISTANT

## 2022-06-03 PROCEDURE — 85610 PROTHROMBIN TIME: CPT | Performed by: PHYSICIAN ASSISTANT

## 2022-06-03 PROCEDURE — 87040 BLOOD CULTURE FOR BACTERIA: CPT | Performed by: PHYSICIAN ASSISTANT

## 2022-06-03 PROCEDURE — 80202 ASSAY OF VANCOMYCIN: CPT | Performed by: PHYSICIAN ASSISTANT

## 2022-06-03 PROCEDURE — G0408 INPT/TELE FOLLOW UP 35: HCPCS | Performed by: INTERNAL MEDICINE

## 2022-06-03 PROCEDURE — 99232 SBSQ HOSP IP/OBS MODERATE 35: CPT | Performed by: PHYSICIAN ASSISTANT

## 2022-06-03 PROCEDURE — 99232 SBSQ HOSP IP/OBS MODERATE 35: CPT

## 2022-06-03 RX ORDER — POTASSIUM CHLORIDE 20 MEQ/1
20 TABLET, EXTENDED RELEASE ORAL ONCE
Status: COMPLETED | OUTPATIENT
Start: 2022-06-03 | End: 2022-06-03

## 2022-06-03 RX ADMIN — ALLOPURINOL 100 MG: 100 TABLET ORAL at 10:15

## 2022-06-03 RX ADMIN — DULOXETINE 20 MG: 20 CAPSULE, DELAYED RELEASE ORAL at 10:15

## 2022-06-03 RX ADMIN — POTASSIUM CHLORIDE 20 MEQ: 1500 TABLET, EXTENDED RELEASE ORAL at 14:49

## 2022-06-03 RX ADMIN — Medication 125 MG: at 18:06

## 2022-06-03 RX ADMIN — Medication 125 MG: at 14:00

## 2022-06-03 RX ADMIN — Medication 125 MG: at 23:12

## 2022-06-03 RX ADMIN — Medication 125 MG: at 05:11

## 2022-06-03 RX ADMIN — WARFARIN SODIUM 2 MG: 2 TABLET ORAL at 18:06

## 2022-06-03 RX ADMIN — TRIAMCINOLONE ACETONIDE: 1 CREAM TOPICAL at 10:14

## 2022-06-03 RX ADMIN — CEFEPIME HYDROCHLORIDE 2000 MG: 2 INJECTION, SOLUTION INTRAVENOUS at 01:53

## 2022-06-03 RX ADMIN — FUROSEMIDE 40 MG: 10 INJECTION, SOLUTION INTRAMUSCULAR; INTRAVENOUS at 10:15

## 2022-06-03 RX ADMIN — Medication 250 MG: at 10:15

## 2022-06-03 RX ADMIN — CEFEPIME HYDROCHLORIDE 2000 MG: 2 INJECTION, SOLUTION INTRAVENOUS at 14:00

## 2022-06-03 RX ADMIN — LEVOTHYROXINE SODIUM 125 MCG: 25 TABLET ORAL at 10:14

## 2022-06-03 RX ADMIN — FUROSEMIDE 40 MG: 10 INJECTION, SOLUTION INTRAMUSCULAR; INTRAVENOUS at 18:06

## 2022-06-03 RX ADMIN — Medication 250 MG: at 18:06

## 2022-06-03 NOTE — ASSESSMENT & PLAN NOTE
· Positive blood culture may be contaminant as is coag  Negative staph  · Does have history of MRSE bacteremia  · Appreciate infectious disease evaluation  · Low suspicion for true bacteremia and endovascular infection  · Continue vancomycin for panniculitis, monitor trough   · Repeat blood cultures today   If positive, will need TIANA otherwise anticipate 10 day course of tx through 6/5

## 2022-06-03 NOTE — ASSESSMENT & PLAN NOTE
73-year-old female with morbid obesity and diastolic CHF recently hospitalized at [facility] who re-presented for recurrent cellulitis of pannus in setting of morbid obesity, chronic lymphedema, increased moisture and superficial skin breakdown  · Infectious disease consulted and appreciate their input  · Wound culture with pseudomonas  · Given positive blood cultures currently on IV vancomycin -> continue for 10 days until 6/5  · Pharmacy protocol ordered- trough of 28 today  Continue trough tomorrow  · Added cefepime per recommendations given growth on wound culture  Per ID, increase dose to 2 q8h until 6/9  · "if isolate is not fluoroquinolone susceptible will need midline placement, otherwise can transition to PO levaquin 750 daily on d/c"  · Continue serial exams  · Continue aggressive skin care with minimizing friction and moisture related skin breakdown   Frequent offloading and repositioning   · Appreciate surgery evaluation; checked ultrasound which was negative for for abscess

## 2022-06-03 NOTE — ASSESSMENT & PLAN NOTE
· Body mass index is 80 77 kg/m²     · Risk factor for recurrent infection and failure of oral antibiotics  · Continue supportive care and recommend outpatient evaluation

## 2022-06-03 NOTE — PROGRESS NOTES
Progress Note - General Surgery   Cristy Horton 80 y o  female MRN: [id number]  Unit/Bed#: -01 Encounter: [id number]    Assessment:  80 y o  female presenting with panniculitis   AVSS, on room air   WBC 9 79 from 6/2  Hgb 10 8 from 6/2   Hypokalemia 3 4 from 3 6   Preliminary wound culture for pseudomonas aeruginosa and proteus mirabilis  US negative for underlying abscess     Plan:  Continue current management, apply kenalog to left abdomen/hip   Continue to monitor for underlying abscess development   IV antibiotics per ID, appreacite recommendations  Medical management per SLIM   Evaluated at bedside with Dr Marven Cushing   Chief Complaint: None     Subjective: No acute overnight events  Patient is overall doing well and offers no acute complaints  No significant changes noted to left and right lower abdomen/hip  Tolerating diet without abdominal pain, nausea, vomiting  Denies chest pain, palpitations, shortness of breath, calf tenderness  Denies fever, chills  Objective:   Blood pressure 110/68, pulse 73, temperature 97 8 °F (36 6 °C), resp  rate 20, height 5' 2" (1 575 m), weight (!) 200 kg (441 lb 9 3 oz), SpO2 93 %  ,Body mass index is 80 77 kg/m²  Intake/Output Summary (Last 24 hours) at 6/3/2022 0916  Last data filed at 6/2/2022 1927  Gross per 24 hour   Intake 480 ml   Output --   Net 480 ml       Invasive Devices  Report    Peripheral Intravenous Line  Duration           Peripheral IV 05/28/22 Proximal;Right;Ventral (anterior) Forearm 5 days              Physical Exam  Vitals reviewed  Constitutional:       General: She is not in acute distress  Appearance: Normal appearance  She is obese  She is not ill-appearing or toxic-appearing  HENT:      Head: Normocephalic and atraumatic  Cardiovascular:      Rate and Rhythm: Normal rate and regular rhythm  Pulses: Normal pulses  Heart sounds: Normal heart sounds  No murmur heard  No gallop  Pulmonary:      Effort: Pulmonary effort is normal  No respiratory distress  Breath sounds: Normal breath sounds  No wheezing, rhonchi or rales  Abdominal:      General: Bowel sounds are normal  There is no distension  Palpations: Abdomen is soft  Tenderness: There is no abdominal tenderness  There is no guarding or rebound  Musculoskeletal:         General: Normal range of motion  Skin:     General: Skin is warm and dry  Capillary Refill: Capillary refill takes less than 2 seconds  Findings: Erythema and lesion present  Comments: Right lower abdomen with improving erythema, no drainage noted; left lower abdomen/hip with erythema, no significant drainage noted   Neurological:      General: No focal deficit present  Mental Status: She is alert and oriented to person, place, and time  Psychiatric:         Mood and Affect: Mood normal          Behavior: Behavior normal          Thought Content: Thought content normal        Lab, Imaging and other studies:  I have personally reviewed pertinent lab results     CMP:   Lab Results   Component Value Date    SODIUM 135 06/03/2022    K 3 4 (L) 06/03/2022    CL 96 06/03/2022    CO2 28 06/03/2022    BUN 28 (H) 06/03/2022    CREATININE 1 11 06/03/2022    CALCIUM 9 0 06/03/2022    EGFR 50 06/03/2022     VTE Pharmacologic Prophylaxis: Warfarin (Coumadin)  VTE Mechanical Prophylaxis: sequential compression device    Mendy Patrick PA-C

## 2022-06-03 NOTE — ASSESSMENT & PLAN NOTE
· Intrinsically controlled  · INR had been supratherapeutic now within limits  Warfarin has been restarted      Results from last 7 days   Lab Units 06/03/22  0407 06/02/22  0505 06/01/22  0434 05/31/22  0446 05/30/22  0427 05/29/22  0436 05/28/22  0608   INR  2 21* 2 14* 2 27* 2 37* 3 22* 3 91* 3 97*

## 2022-06-03 NOTE — ASSESSMENT & PLAN NOTE
Wt Readings from Last 3 Encounters:   06/03/22 (!) 200 kg (441 lb 9 3 oz)   05/08/22 (!) 212 kg (467 lb)   05/06/22 (!) 193 kg (425 lb)     · Continue IV diuresis for lower extremity swelling and panniculitis  · Continue low Na/fluid restriction, daily weights/I&Os

## 2022-06-03 NOTE — APP STUDENT NOTE
KYLE STUDENT  Inpatient Progress Note for TRAINING ONLY  Not Part of Legal Medical Record     Progress Note - Jeffrey Spears 80 y o  female MRN: [id number]  Unit/Bed#: -01 Encounter: [id number]        * Panniculitis  Assessment & Plan  64-year-old female with morbid obesity and diastolic CHF recently hospitalized at [facility] who re-presented for recurrent cellulitis of pannus in setting of morbid obesity, chronic lymphedema, increased moisture and superficial skin breakdown  · Infectious disease consulted and appreciate their input  · Wound culture with pseudomonas  · Given positive blood cultures currently on IV vancomycin-continue for 10 days until 6/5  · Pharmacy protocol ordered- trough of 28 today  Continue trough tomorrow  · Added cefepime per recommendations given growth on wound culture  Per ID, increase dose to 2 q8h until 6/9  · "if isolate is not fluoroquinolone susceptible will need midline placement, otherwise can transition to PO levaquin 750 daily on d/c"  · Continue serial exams  · Continue aggressive skin care with minimizing friction and moisture related skin breakdown  Frequent offloading and repositioning   · Appreciate surgery evaluation; checked ultrasound which was negative for for abscess    Results from last 7 days   Lab Units 05/28/22  0608 05/27/22  2153   PROCALCITONIN ng/ml 0 06 0 06       Positive blood culture  Assessment & Plan  · Positive blood culture may be contaminant as is coag  Negative staph  · Does have history of MRSE bacteremia  · Appreciate infectious disease evaluation  · Low suspicion for true bacteremia and endovascular infection  · Continue vancomycin for panniculitis  · Repeat blood cultures today   If positive, will need TIANA     History of Clostridioides difficile infection  Assessment & Plan  · Continue oral vancomycin x72 hours beyond systemic antibiotics  · Continue probiotics while on systemic antibiotics    Super-super obese (HCC)  Assessment & Plan  · Body mass index is 80 77 kg/m²  · Risk factor for recurrent infection and failure of oral antibiotics  · Continue supportive care and recommend outpatient evaluation    Chronic atrial fibrillation ([address] )  Assessment & Plan  · Intrinsically controlled  · INR had been supratherapeutic now within limits  Warfarin has been restarted    Results from last 7 days   Lab Units 06/03/22  0407 06/02/22  0505 06/01/22  0434 05/31/22  0446 05/30/22  0427 05/29/22  0436 05/28/22  0608   INR  2 21* 2 14* 2 27* 2 37* 3 22* 3 91* 3 97*       Stage 3a chronic kidney disease (HCC)  Assessment & Plan  · Slight increase above baseline  Continue to monitor while on diuresis  Repeat BMP in AM     Results from last 7 days   Lab Units 06/03/22  0407 06/02/22  0505 06/01/22  0434 05/31/22  0446 05/30/22  0427 05/29/22  0436 05/28/22  0608 05/27/22  2153   BUN mg/dL 28* 28* 25 23 21 22 23 25   CREATININE mg/dL 1 11 1 21 1 08 1 03 1 05 0 95 0 95 1 02   EGFR ml/min/1 73sq m 50 45 52 55 53 60 60 55       Lymphedema of extremity  Assessment & Plan  · Chronic lymphedema with panniculitis  · Continue IV furosemide to expedite diuresis    Chronic diastolic congestive heart failure (HCC)  Assessment & Plan  Wt Readings from Last 3 Encounters:   06/03/22 (!) 200 kg (441 lb 9 3 oz)   05/08/22 (!) 212 kg (467 lb)   05/06/22 (!) 193 kg (425 lb)     · Continue IV diuresis for lower extremity swelling and panniculitis  · Continue low Na/fluid restriction  · Continue daily weights/I&Os    Other specified hypothyroidism  Assessment & Plan  · Continue levothyroxine    Gout  Assessment & Plan  · Continue allopurinol    Depression with anxiety  Assessment & Plan  · Mood stable continue duloxetine         VTE Pharmacologic Prophylaxis:   Pharmacologic: Warfarin (Coumadin)  Mechanical VTE Prophylaxis in Place: Yes    Patient Centered Rounds: I have performed bedside rounds with nursing staff today      Discussions with Specialists or Other Care Team Provider: Reviewed ID notes and surgery  Kayden Reveles PA-C    Education and Discussions with Family / Patient: Discussed w patient    Time Spent for Care: 30 minutes  More than 50% of total time spent on counseling and coordination of care as described above  Current Length of Stay: 7 day(s)    Current Patient Status: Inpatient   Certification Statement: The patient will continue to require additional inpatient hospital stay due to IV antibiotics    Discharge Plan: Will discharge with home health  Patient [facility] with care for 12 hours/day 7 days/week  D/c in >2 days once IV antibiotics are completed  Code Status: Level 1 - Full Code    Subjective:   Patient was seen and examined at bedside  She reports she is doing well today and feels as if she is improving  She expresses frustration with this recurrent problem  Patient says she is bed bound at home  Denies any headaches, fevers, chills, rhinorrhea, sore throat, cough, chest pain, SOB, palpitations, abdominal pain, nvd, dysuria, hematuria, numbness, tingling, swelling in the legs  Objective:     Vitals:   Temp (24hrs), Av 7 °F (36 5 °C), Min:97 6 °F (36 4 °C), Max:97 8 °F (36 6 °C)    Temp:  [97 6 °F (36 4 °C)-97 8 °F (36 6 °C)] 97 8 °F (36 6 °C)  HR:  [70-73] 73  Resp:  [18-20] 20  BP: (110-126)/(68-78) 110/68  SpO2:  [90 %-95 %] 93 %  Body mass index is 80 77 kg/m²  Input and Output Summary (last 24 hours): Intake/Output Summary (Last 24 hours) at 6/3/2022 0936  Last data filed at 2022 1927  Gross per 24 hour   Intake 480 ml   Output --   Net 480 ml       Physical Exam:     Physical Exam  Vitals and nursing note reviewed  Constitutional:       General: She is not in acute distress  Appearance: She is obese  HENT:      Head: Normocephalic and atraumatic        Nose: Nose normal       Mouth/Throat:      Mouth: Mucous membranes are moist    Eyes:      Conjunctiva/sclera: Conjunctivae normal    Cardiovascular: Rate and Rhythm: Normal rate  Rhythm irregular  Pulses: Normal pulses  Heart sounds: Normal heart sounds  Comments: History afib  Pulmonary:      Effort: Pulmonary effort is normal       Breath sounds: Normal breath sounds  Abdominal:      General: Bowel sounds are normal       Palpations: Abdomen is soft  Tenderness: There is no abdominal tenderness  Musculoskeletal:         General: No swelling or tenderness  Skin:     General: Skin is warm and dry  Comments: Panniculitis is erythematous with yellow crusting   Neurological:      General: No focal deficit present  Mental Status: She is alert and oriented to person, place, and time     Psychiatric:         Mood and Affect: Mood normal          Behavior: Behavior normal          Historical Information   Past Medical History:   Diagnosis Date    Atrial fibrillation ([facility] )     Bladder stone     C  difficile colitis     Cellulitis     [facility])     Depression with anxiety     Disease of thyroid gland     Diverticulitis     Enterocolitis     History of abnormal cervical Pap smear     Kidney stone     Lymphedema     Menopause     Age 53    Morbid obesity with BMI of 70 and over, adult ([place] [address] )     MRSA (methicillin resistant Staphylococcus aureus)     abdominal wound    Osteoarthritis     Phlebitis     left lower leg    Spondylolysis      Past Surgical History:   Procedure Laterality Date    APPENDECTOMY      CARPAL TUNNEL RELEASE      CHOLECYSTECTOMY      CYSTOSCOPY      stent    FOOT SURGERY  1982    bone spur    KNEE SURGERY      WISDOM TOOTH EXTRACTION       Social History   Social History     Substance and Sexual Activity   Alcohol Use Not Currently     Social History     Substance and Sexual Activity   Drug Use Not Currently    Comment: As a teen - As per [employer]      Social History     Tobacco Use   Smoking Status Former Smoker   Smokeless Tobacco Never Used   Tobacco Comment 5 packs/day until 6 (age 16-20) - As per [employer]      Family History:   Family History   Problem Relation Age of Onset    Heart failure Mother     Heart disease Mother     Lymphoma Mother     Kidney disease Father     Heart disease Father     Heart failure Father     Diabetes type II Father     Diabetes type II Sister     Diabetes type II Brother     Uterine cancer Paternal Aunt     Breast cancer Neg Hx     Colon cancer Neg Hx     Ovarian cancer Neg Hx        Meds/Allergies   PTA meds:   Prior to Admission Medications   Prescriptions Last Dose Informant Patient Reported? Taking?    DULoxetine (CYMBALTA) 20 mg capsule 5/28/2022 at Unknown time Self No Yes   Sig: Take 1 capsule (20 mg total) by mouth daily   acetaminophen (TYLENOL) 650 mg CR tablet 5/28/2022 at Unknown time Self Yes Yes   Sig: Take 650 mg by mouth every 8 (eight) hours   allopurinol (ZYLOPRIM) 100 mg tablet 5/28/2022 at Unknown time Self No Yes   Sig: Take 1 tablet (100 mg total) by mouth daily   furosemide (LASIX) 40 mg tablet Past Week at Unknown time Self No Yes   Sig: Take 1 tablet (40 mg total) by mouth daily   Patient taking differently: Take 20 mg by mouth daily as needed Prn weight gain 10 pounds   levothyroxine 125 mcg tablet 5/28/2022 at Unknown time Self Yes Yes   Sig: Take 125 mcg by mouth daily   triamcinolone (KENALOG) 0 1 % cream 5/28/2022 at Unknown time Self Yes Yes   Sig: Apply 0 1 application topically 2 (two) times a day   warfarin (COUMADIN) 2 mg tablet Past Week at Unknown time  No Yes   Sig: Take 1 tablet (2 mg total) by mouth every other day Monday, Wednesday, Friday, sunday   warfarin (COUMADIN) 5 mg tablet Past Week at Unknown time  No Yes   Sig: Take 1 tablet (5 mg total) by mouth every other day Tuesday, Thursday, saturday      Facility-Administered Medications: None     Allergies   Allergen Reactions    Augmentin Es-600  [Amoxicillin-Pot Clavulanate]     Penicillins Other (See Comments)     Tolerates cefepime (11/18/21)    Sulfa Antibiotics Hives    Vitamin C [Ascorbate - Food Allergy]     Latex Rash and Edema       Additional Data:     Labs:    Results from last 7 days   Lab Units 06/02/22  0505 05/31/22  0446 05/30/22  0427   WBC Thousand/uL 9 79   < > 7 67   HEMOGLOBIN g/dL 10 8*   < > 11 3*   HEMATOCRIT % 36 6   < > 38 5   PLATELETS Thousands/uL 374   < > 414*   NEUTROS PCT %  --   --  74   LYMPHS PCT %  --   --  14   MONOS PCT %  --   --  7   EOS PCT %  --   --  4    < > = values in this interval not displayed  Results from last 7 days   Lab Units 06/03/22  0407 06/02/22  0505   SODIUM mmol/L 135 137   POTASSIUM mmol/L 3 4* 3 6   CHLORIDE mmol/L 96 97   CO2 mmol/L 28 30   BUN mg/dL 28* 28*   CREATININE mg/dL 1 11 1 21   ANION GAP mmol/L 11 10   CALCIUM mg/dL 9 0 8 9   ALBUMIN g/dL  --  2 9*   TOTAL BILIRUBIN mg/dL  --  0 66   ALK PHOS U/L  --  100   ALT U/L  --  16   AST U/L  --  23   GLUCOSE RANDOM mg/dL 108 131     Results from last 7 days   Lab Units 06/03/22  0407   INR  2 21*             Results from last 7 days   Lab Units 05/28/22  0608 05/27/22  2153   PROCALCITONIN ng/ml 0 06 0 06         * I Have Reviewed All Lab Data Listed Above  * Additional Pertinent Lab Tests Reviewed: [address] Admission Reviewed    Imaging:    Imaging Reports Reviewed Today Include: US  Imaging Personally Reviewed by Myself Includes:  US    Recent Cultures (last 7 days):     Results from last 7 days   Lab Units 05/31/22  1324 05/27/22  2153 05/27/22  2042   BLOOD CULTURE   --  Staphylococcus coagulase negative* No Growth After 5 Days     GRAM STAIN RESULT  1+ Gram negative rods*  No polys seen* Gram positive cocci in clusters*  --    WOUND CULTURE  3+ Growth of Pseudomonas aeruginosa*  2+ Growth of   --   --        Last 24 Hours Medication List:   Current Facility-Administered Medications   Medication Dose Route Frequency Provider Last Rate    acetaminophen  650 mg Oral Q4H MELCHOR Oliver Si MYA Dey      allopurinol  100 mg Oral Daily [place], [place]      cefepime  2,000 mg Intravenous Q12H Pérez Marquez DO 2,000 mg (06/03/22 0153)    DULoxetine  20 mg Oral Daily [place], [place]      furosemide  40 mg Intravenous BID (diuretic) Vinicius Cornelius PA-C      levothyroxine  125 mcg Oral Daily [place], [place]      ondansetron  4 mg Intravenous Q6H PRN Vinicius Cornelius PA-C      saccharomyces boulardii  250 mg Oral BID [place], [place]      triamcinolone   Topical BID [place], [place]      [START ON 6/4/2022] vancomycin  12 5 mg/kg (Adjusted) Intravenous Once PRN Vinicius Cornelius PA-C      vancomycin  125 mg Oral HOSP [facility] [place], [place]      warfarin  5 mg Oral Every Other Day Izaiah Collazo DO      And    warfarin  2 mg Oral Every Other Day Izaiah Collazo DO          Today, Patient Was Seen By: Kilo Birmingham    ** Please Note: Dictation voice to text software may have been used in the creation of this document   **

## 2022-06-03 NOTE — PROGRESS NOTES
Progress Note - Infectious Disease   Fili Shine 80 y o  female MRN: [id number]  Unit/Bed#: -01 Encounter: [id number]        REQUIRED DOCUMENTATION:     1  This service was provided via Telemedicine  2  Provider located at [facility]  3  TeleMed provider: Garett Grove MD   4  Identify all parties in room with patient during tele consult:RN  5  After connecting through [employer], patient was identified by name and date of birth and assistant checked wristband  Patient was then informed that this was a Telemedicine visit and that the exam was being conducted confidentially over secure lines  My office door was closed  No one else was in the room  Patient acknowledged consent and understanding of privacy and security of the Telemedicine visit, and gave us permission to have the assistant stay in the room in order to assist with the history and to conduct the exam   I informed the patient that I have reviewed their record in Epic and presented the opportunity for them to ask any questions regarding the visit today  The patient agreed to participate         Assessment/Recommendations     1  Cellulitis of left lower abdominal pannus   - In the setting of morbid obesity, chronic lymphedema, increased moisture and superficial skin breakdown  - Wound cx with Pseudomonas (1 strain with PETER of 8 to cefepime) and Proteus  - US without abscess  - afebrile without leukocytosis and decreasing erythema, drainage     · Continue vanc, anticipate completing 10 days of therapy through 6/5  · Continue cefepime, increase dose to 2 q8h, anticipate completing 10 days of therapy through 6/9, can transition to po levaquin 750 daily on discharge  · FU wound cx   · Serial exams   · Additional management per surgery  · Continue aggressive skin care, minimize friction and moisture related skin breakdown, frequent offloading, repositioning  · Discussed natural history of cellulitis and discussed with patient that she is at risk for recurrent cellulitis/bacteremia if underlying risk factors cannot be modified     2  Positive blood cx  - Bld cx previous admission both sets with MRSE, completed 7 days of iv vanc  - Now single set with MRSE, no intravascular prosthetic devices or hardware  - TTE negative  - Do not suspect true bacteremia given isolated positive cx but harder to interpret given recent positive bld cx  Low suspicion for endovascular infection       · Continue vanc (pulse dosing at present for elevated trough levels)  · FU repeat blood cx  · If repeat bld cx are positive, patient will need ITANA otherwise anticipate completing 10 days of therapy through      3  Recurrent C difficile colitis     - No evidence of active colitis        · Continue p o  Vancomycin prophylaxis x 72 hours beyond systemic antibiotic      4  Morbid obesity  - Risk factor for recurrent infection and failure of oral antibiotics for cellulitis    ·  Continue supportive care, recommend outpatient evaluation for weight loss    5  Drug allergies  - Hives with penicillin    Discussed with the primary service  History       Subjective: The patient continues to note improvement in left lower abdominal pain, redness, drainage  Denies fevers, chills, or sweats  Denies nausea, vomiting, or diarrhea      Antibiotics:  Vanc, cefepime      Physical Exam     Temp:  [97 6 °F (36 4 °C)-97 8 °F (36 6 °C)] 97 8 °F (36 6 °C)  HR:  [70-73] 73  Resp:  [18-20] 20  BP: (110-126)/(68-78) 110/68  SpO2:  [90 %-95 %] 93 %  Temp (24hrs), Av 7 °F (36 5 °C), Min:97 6 °F (36 4 °C), Max:97 8 °F (36 6 °C)  Current: Temperature: 97 8 °F (36 6 °C)    Intake/Output Summary (Last 24 hours) at 6/3/2022 4567  Last data filed at 2022 1927  Gross per 24 hour   Intake 480 ml   Output --   Net 480 ml       Physical exam findings reported by bedside and primary medical team staff      General Appearance:  Appearing chronically ill, nontoxic, and in no distress, appears stated age Throat: Oropharynx moist without lesions; lips, mucosa, and tongue normal; teeth and gums normal   Lungs:   Diminished bilaterally, no audible wheezes, rhonchi and rales, respirations unlabored   Heart:  Regular rate and rhythm, S1, S2 normal, no murmur, rub or gallop   Abdomen:   Soft, non-tender, non-distended, positive bowel sounds, no masses, no organomegaly    No CVA tenderness   Extremities: Extremities normal, atraumatic, no cyanosis, clubbing or edema   Skin: Left lower abdomen wound dressed, not saturated   Neurologic: Alert and oriented times 3         Invasive Devices:   Peripheral IV 05/28/22 Proximal;Right;Ventral (anterior) Forearm (Active)   Site Assessment WDL 05/31/22 1200   Dressing Type Transparent 05/30/22 1028   Line Status Infusing 05/30/22 1028   Dressing Status Clean;Dry; Intact 05/30/22 1028       Labs, Imaging, & Other Studies     Lab Results:    I have personally reviewed pertinent labs  Results from last 7 days   Lab Units 06/02/22  0505 06/01/22  0434 05/31/22  0446   WBC Thousand/uL 9 79 8 78 7 29   HEMOGLOBIN g/dL 10 8* 10 6* 10 9*   PLATELETS Thousands/uL 374 366 401*     Results from last 7 days   Lab Units 06/03/22  0407 06/02/22  0505 06/01/22  0434 05/31/22  0446   POTASSIUM mmol/L 3 4* 3 6 3 3* 3 8   CHLORIDE mmol/L 96 97 99 100   CO2 mmol/L 28 30 28 30   BUN mg/dL 28* 28* 25 23   CREATININE mg/dL 1 11 1 21 1 08 1 03   EGFR ml/min/1 73sq m 50 45 52 55   CALCIUM mg/dL 9 0 8 9 8 8 8 8   AST U/L  --  23 22 22   ALT U/L  --  16 16 16   ALK PHOS U/L  --  100 101 109*     Results from last 7 days   Lab Units 05/31/22  1324 05/27/22  2153 05/27/22  2042   BLOOD CULTURE   --  Staphylococcus coagulase negative* No Growth After 5 Days     GRAM STAIN RESULT  1+ Gram negative rods*  No polys seen* Gram positive cocci in clusters*  --    WOUND CULTURE  3+ Growth of Pseudomonas aeruginosa*  2+ Growth of   --   --        Imaging Studies:   I have personally reviewed pertinent imaging study reports and images in PACS  EKG, Pathology, and Other Studies:   I have personally reviewed pertinent reports  Counseling/Coordination of care: Total 35 minutes communication with the patient via telehealth  Labs, medical tests and imaging studies were independently reviewed by me as noted above

## 2022-06-03 NOTE — ASSESSMENT & PLAN NOTE
· Continue oral vancomycin x72 hours beyond systemic antibiotics  · Continue probiotics while on systemic antibiotics

## 2022-06-03 NOTE — PLAN OF CARE
Problem: Potential for Falls  Goal: Patient will remain free of falls  Description: INTERVENTIONS:  - Educate patient/family on patient safety including physical limitations  - Instruct patient to call for assistance with activity   - Consult OT/PT to assist with strengthening/mobility   - Keep Call bell within reach  - Keep bed low and locked with side rails adjusted as appropriate  - Keep care items and personal belongings within reach  - Initiate and maintain comfort rounds  - Make Fall Risk Sign visible to staff  - Offer Toileting every 2 Hours, in advance of need  - Initiate/Maintain bed alarm  - Apply yellow socks and bracelet for high fall risk patients  - Consider moving patient to room near nurses station  Outcome: Progressing     Problem: PAIN - ADULT  Goal: Verbalizes/displays adequate comfort level or baseline comfort level  Description: Interventions:  - Encourage patient to monitor pain and request assistance  - Assess pain using appropriate pain scale  - Administer analgesics based on type and severity of pain and evaluate response  - Implement non-pharmacological measures as appropriate and evaluate response  - Consider cultural and social influences on pain and pain management  - Notify physician/advanced practitioner if interventions unsuccessful or patient reports new pain  Outcome: Progressing     Problem: INFECTION - ADULT  Goal: Absence or prevention of progression during hospitalization  Description: INTERVENTIONS:  - Assess and monitor for signs and symptoms of infection  - Monitor lab/diagnostic results  - Monitor all insertion sites, i e  indwelling lines, tubes, and drains  - Monitor endotracheal if appropriate and nasal secretions for changes in amount and color  - Bowie appropriate cooling/warming therapies per order  - Administer medications as ordered  - Instruct and encourage patient and family to use good hand hygiene technique  - Identify and instruct in appropriate isolation precautions for identified infection/condition  Outcome: Progressing  Goal: Absence of fever/infection during neutropenic period  Description: INTERVENTIONS:  - Monitor WBC    Outcome: Progressing     Problem: SAFETY ADULT  Goal: Maintain or return to baseline ADL function  Description: INTERVENTIONS:  -  Assess patient's ability to carry out ADLs; assess patient's baseline for ADL function and identify physical deficits which impact ability to perform ADLs (bathing, care of mouth/teeth, toileting, grooming, dressing, etc )  - Assess/evaluate cause of self-care deficits   - Assess range of motion  - Assess patient's mobility; develop plan if impaired  - Assess patient's need for assistive devices and provide as appropriate  - Encourage maximum independence but intervene and supervise when necessary  - Involve family in performance of ADLs  - Assess for home care needs following discharge   - Consider OT consult to assist with ADL evaluation and planning for discharge  - Provide patient education as appropriate  Outcome: Progressing  Goal: Maintains/Returns to pre admission functional level  Description: INTERVENTIONS:  - Perform BMAT or MOVE assessment daily    - Set and communicate daily mobility goal to care team and patient/family/caregiver  - Collaborate with rehabilitation services on mobility goals if consulted  - Reposition patient every 2 hours    - Record patient progress and toleration of activity level   Outcome: Progressing     Problem: DISCHARGE PLANNING  Goal: Discharge to home or other facility with appropriate resources  Description: INTERVENTIONS:  - Identify barriers to discharge w/patient and caregiver  - Arrange for needed discharge resources and transportation as appropriate  - Identify discharge learning needs (meds, wound care, etc )  - Arrange for interpretive services to assist at discharge as needed  - Refer to Case Management Department for coordinating discharge planning if the patient needs post-hospital services based on physician/advanced practitioner order or complex needs related to functional status, cognitive ability, or social support system  Outcome: Progressing     Problem: Knowledge Deficit  Goal: Patient/family/caregiver demonstrates understanding of disease process, treatment plan, medications, and discharge instructions  Description: Complete learning assessment and assess knowledge base    Interventions:  - Provide teaching at level of understanding  - Provide teaching via preferred learning methods  Outcome: Progressing

## 2022-06-03 NOTE — PROGRESS NOTES
Vancomycin Assessment    Brandon Dalton is a 80 y o  female who is currently receiving vancomycin 1500mg IV pusle dosing when trough less than 20 for MRSA confirmed, skin-soft tissue infection     Relevant clinical data and objective history reviewed:  Creatinine   Date Value Ref Range Status   06/03/2022 1 11 0 60 - 1 30 mg/dL Final     Comment:     Standardized to IDMS reference method   06/02/2022 1 21 0 60 - 1 30 mg/dL Final     Comment:     Standardized to IDMS reference method   06/01/2022 1 08 0 60 - 1 30 mg/dL Final     Comment:     Standardized to IDMS reference method   05/19/2018 1 03 0 6 - 1 2 MG/DL Final     Comment:     IDMS method performed  The drugs Metamizole, Sulfasalazine, and Sulfapyridine may interfere and  result in false low results  Vancomycin Rm   Date Value Ref Range Status   06/03/2022 28 0 (H) 10 0 - 20 0 ug/mL Final     /78 (BP Location: Left arm)   Pulse 71   Temp 97 6 °F (36 4 °C) (Temporal)   Resp 18   Ht 5' 2" (1 575 m)   Wt (!) 200 kg (441 lb 9 3 oz)   LMP  (LMP Unknown)   SpO2 95%   BMI 80 77 kg/m²   I/O last 3 completed shifts: In: 530 [P O :480; IV Piggyback:50]  Out: -   Lab Results   Component Value Date/Time    BUN 28 (H) 06/03/2022 04:07 AM    BUN 33 (H) 05/19/2018 06:15 PM    WBC 9 79 06/02/2022 05:05 AM    WBC 9 1 05/19/2018 06:15 PM    HGB 10 8 (L) 06/02/2022 05:05 AM    HGB 12 3 05/19/2018 06:15 PM    HCT 36 6 06/02/2022 05:05 AM    HCT 38 5 05/19/2018 06:15 PM    MCV 84 06/02/2022 05:05 AM    MCV 93 3 05/19/2018 06:15 PM     06/02/2022 05:05 AM     05/19/2018 06:15 PM     Temp Readings from Last 3 Encounters:   06/02/22 97 6 °F (36 4 °C) (Temporal)   05/13/22 97 7 °F (36 5 °C)   05/06/22 (!) 97 3 °F (36 3 °C)     Vancomycin Days of Therapy: 8    Assessment/Plan  The patient is currently on vancomycin utilizing pulse dosing    Baseline risks associated with therapy include: pre-existing renal impairment, concomitant nephrotoxic medications, and advanced age  The patient is receiving 1500mg IV pusle dosing when trough less than 20 with the most recent vancomycin level being at steady-state and supratherapeutic based on a goal of 15-20 (appropriate for most indications) ; therefore, is clinically appropriate and dose will be continued   Pharmacy will continue to follow closely for s/sx of nephrotoxicity, infusion reactions, and appropriateness of therapy  BMP and CBC will be ordered per protocol  Plan for trough as patient approaches steady state, prior to the other  dose at approximately 0600 on 06/04/22  Pharmacy will continue to follow the patients culture results and clinical progress daily      Walter Bose, Pharmacist

## 2022-06-03 NOTE — PROGRESS NOTES
[facility]  Progress Note - Magdiel Borjas 1950, 80 y o  female MRN: [id number]  Unit/Bed#: -01 Encounter: [id number]  Primary Care Provider: ORACIO Lima   Date and time admitted to hospital: 5/27/2022  7:52 PM    * Panniculitis  Assessment & Plan  80-year-old female with morbid obesity and diastolic CHF recently hospitalized at [facility] who re-presented for recurrent cellulitis of pannus in setting of morbid obesity, chronic lymphedema, increased moisture and superficial skin breakdown  · Infectious disease consulted and appreciate their input  · Wound culture with pseudomonas  · Given positive blood cultures currently on IV vancomycin -> continue for 10 days until 6/5  · Pharmacy protocol ordered- trough of 28 today  Continue trough tomorrow  · Added cefepime per recommendations given growth on wound culture  Per ID, increase dose to 2 q8h until 6/9  · "if isolate is not fluoroquinolone susceptible will need midline placement, otherwise can transition to PO levaquin 750 daily on d/c"  · Continue serial exams  · Continue aggressive skin care with minimizing friction and moisture related skin breakdown  Frequent offloading and repositioning   · Appreciate surgery evaluation; checked ultrasound which was negative for for abscess    Positive blood culture  Assessment & Plan  · Positive blood culture may be contaminant as is coag  Negative staph  · Does have history of MRSE bacteremia  · Appreciate infectious disease evaluation  · Low suspicion for true bacteremia and endovascular infection  · Continue vancomycin for panniculitis, monitor trough   · Repeat blood cultures today  If positive, will need TIANA otherwise anticipate 10 day course of tx through 6/5    Chronic atrial fibrillation ([address] )  Assessment & Plan  · Intrinsically controlled  · INR had been supratherapeutic now within limits  Warfarin has been restarted      Results from last 7 days   Lab Units 06/03/22  0407 06/02/22  0505 06/01/22  0434 05/31/22  0446 05/30/22  0427 05/29/22  0436 05/28/22  0608   INR  2 21* 2 14* 2 27* 2 37* 3 22* 3 91* 3 97*       History of Clostridioides difficile infection  Assessment & Plan  · Continue oral vancomycin x72 hours beyond systemic antibiotics  · Continue probiotics while on systemic antibiotics    Gout  Assessment & Plan  · Continue allopurinol    Depression with anxiety  Assessment & Plan  · Mood stable continue duloxetine    Stage 3a chronic kidney disease (HCC)  Assessment & Plan  · Slight increase above baseline  Continue to monitor while on diuresis  Repeat BMP in AM     Lymphedema of extremity  Assessment & Plan  · Chronic lymphedema with panniculitis  · Continue IV furosemide to expedite diuresis    Chronic diastolic congestive heart failure [facility])  Assessment & Plan  Wt Readings from Last 3 Encounters:   06/03/22 (!) 200 kg (441 lb 9 3 oz)   05/08/22 (!) 212 kg (467 lb)   05/06/22 (!) 193 kg (425 lb)     · Continue IV diuresis for lower extremity swelling and panniculitis  · Continue low Na/fluid restriction, daily weights/I&Os    Super-super obese (HCC)  Assessment & Plan  · Body mass index is 80 77 kg/m²  · Risk factor for recurrent infection and failure of oral antibiotics  · Continue supportive care and recommend outpatient evaluation    Other specified hypothyroidism  Assessment & Plan  · Continue levothyroxine    VTE Pharmacologic Prophylaxis: VTE Score: 6 High Risk (Score >/= 5) - Pharmacological DVT Prophylaxis Ordered: warfarin (Coumadin)  Sequential Compression Devices Ordered  Patient Centered Rounds: I performed bedside rounds with nursing staff today  Discussions with Specialists or Other Care Team Provider: nurse, cm     Education and Discussions with Family / Patient: Patient declined call to   Time Spent for Care: 30 minutes   More than 50% of total time spent on counseling and coordination of care as described above     Current Length of Stay: 7 day(s)  Current Patient Status: Inpatient   Certification Statement: The patient will continue to require additional inpatient hospital stay due to pending treatment of panniculitis with IV abx, safe dc planning   Discharge Plan: Anticipate discharge in >72 hrs to home with home services  Code Status: Level 1 - Full Code    Subjective:   Pt seen and examined at bedside  Finally had BM today  Objective:     Vitals:   Temp (24hrs), Av 7 °F (36 5 °C), Min:97 6 °F (36 4 °C), Max:97 8 °F (36 6 °C)    Temp:  [97 6 °F (36 4 °C)-97 8 °F (36 6 °C)] 97 8 °F (36 6 °C)  HR:  [70-73] 73  Resp:  [18-20] 20  BP: (110-126)/(68-78) 110/68  SpO2:  [90 %-95 %] 93 %  Body mass index is 80 77 kg/m²  Input and Output Summary (last 24 hours): Intake/Output Summary (Last 24 hours) at 6/3/2022 1426  Last data filed at 2022 1927  Gross per 24 hour   Intake 240 ml   Output --   Net 240 ml       Physical Exam:   Physical Exam  Constitutional:       Appearance: Normal appearance  She is obese  She is not ill-appearing  HENT:      Head: Normocephalic and atraumatic  Mouth/Throat:      Mouth: Mucous membranes are moist    Eyes:      Extraocular Movements: Extraocular movements intact  Cardiovascular:      Rate and Rhythm: Normal rate and regular rhythm  Comments: Dec heart sounds 2/2 body habitus  Abdominal:      General: Bowel sounds are normal       Palpations: Abdomen is soft  Musculoskeletal:         General: Swelling present  Normal range of motion  Cervical back: Normal range of motion and neck supple  Skin:     General: Skin is warm and dry  Findings: Erythema (pannus ) present  Neurological:      General: No focal deficit present  Mental Status: She is alert     Psychiatric:         Mood and Affect: Mood normal          Behavior: Behavior normal       Additional Data:     Labs:  Results from last 7 days   Lab Units 22  0505 22  3263 05/30/22  0427   WBC Thousand/uL 9 79   < > 7 67   HEMOGLOBIN g/dL 10 8*   < > 11 3*   HEMATOCRIT % 36 6   < > 38 5   PLATELETS Thousands/uL 374   < > 414*   NEUTROS PCT %  --   --  74   LYMPHS PCT %  --   --  14   MONOS PCT %  --   --  7   EOS PCT %  --   --  4    < > = values in this interval not displayed  Results from last 7 days   Lab Units 06/03/22  0407 06/02/22  0505   SODIUM mmol/L 135 137   POTASSIUM mmol/L 3 4* 3 6   CHLORIDE mmol/L 96 97   CO2 mmol/L 28 30   BUN mg/dL 28* 28*   CREATININE mg/dL 1 11 1 21   ANION GAP mmol/L 11 10   CALCIUM mg/dL 9 0 8 9   ALBUMIN g/dL  --  2 9*   TOTAL BILIRUBIN mg/dL  --  0 66   ALK PHOS U/L  --  100   ALT U/L  --  16   AST U/L  --  23   GLUCOSE RANDOM mg/dL 108 131     Results from last 7 days   Lab Units 06/03/22  0407   INR  2 21*             Results from last 7 days   Lab Units 05/28/22  0608 05/27/22  2153   PROCALCITONIN ng/ml 0 06 0 06       Lines/Drains:  Invasive Devices  Report    Peripheral Intravenous Line  Duration           Peripheral IV 05/28/22 Proximal;Right;Ventral (anterior) Forearm 5 days                      Imaging: Reviewed radiology reports from this admission including: abdominal/pelvic CT    Recent Cultures (last 7 days):   Results from last 7 days   Lab Units 06/03/22  0406 05/31/22  1324 05/27/22  2153 05/27/22  2042   BLOOD CULTURE  Received in Microbiology Lab  Culture in Progress  Received in Microbiology Lab  Culture in Progress  --  Staphylococcus coagulase negative* No Growth After 5 Days     GRAM STAIN RESULT   --  1+ Gram negative rods*  No polys seen* Gram positive cocci in clusters*  --    WOUND CULTURE   --  3+ Growth of Pseudomonas aeruginosa*  3+ Growth of Pseudomonas aeruginosa MDR*  3+ Growth of Proteus mirabilis*  2+ Growth of   --   --        Last 24 Hours Medication List:   Current Facility-Administered Medications   Medication Dose Route Frequency Provider Last Rate    acetaminophen  650 mg Oral Q4H MELCHOR Mcgarry Aguila Kumar PA-C      allopurinol  100 mg Oral Daily [place], [place]      cefepime  2,000 mg Intravenous Q12H Pérez Marquez, DO 2,000 mg (06/03/22 1400)    DULoxetine  20 mg Oral Daily [place], [place]      furosemide  40 mg Intravenous BID (diuretic) Adi Bazan PA-C      levothyroxine  125 mcg Oral Daily [place], [place]      ondansetron  4 mg Intravenous Q6H PRN Adi Bazan PA-C      potassium chloride  20 mEq Oral Once Tayla Zapata PA-C      saccharomyces boulardii  250 mg Oral BID Adi Bazan PA-C      triamcinolone   Topical BID [place], [place]      [START ON 6/4/2022] vancomycin  12 5 mg/kg (Adjusted) Intravenous Once PRN Adi Bazan PA-C      vancomycin  125 mg Oral HOS[facility] [place], [place]      warfarin  5 mg Oral Every Other Day Mario Games, DO      And    warfarin  2 mg Oral Every Other Day Mario Games, DO          Today, Patient Was Seen By: Elias Cardoza PA-C    **Please Note: This note may have been constructed using a voice recognition system  **

## 2022-06-04 LAB
ANION GAP SERPL CALCULATED.3IONS-SCNC: 11 MMOL/L (ref 4–13)
BACTERIA WND AEROBE CULT: ABNORMAL
BUN SERPL-MCNC: 30 MG/DL (ref 5–25)
CALCIUM SERPL-MCNC: 8.6 MG/DL (ref 8.4–10.2)
CHLORIDE SERPL-SCNC: 95 MMOL/L (ref 96–108)
CO2 SERPL-SCNC: 30 MMOL/L (ref 21–32)
CREAT SERPL-MCNC: 1.19 MG/DL (ref 0.6–1.3)
ERYTHROCYTE [DISTWIDTH] IN BLOOD BY AUTOMATED COUNT: 16.7 % (ref 11.6–15.1)
GFR SERPL CREATININE-BSD FRML MDRD: 46 ML/MIN/1.73SQ M
GLUCOSE SERPL-MCNC: 95 MG/DL (ref 65–140)
GRAM STN SPEC: ABNORMAL
GRAM STN SPEC: ABNORMAL
HCT VFR BLD AUTO: 36.8 % (ref 34.8–46.1)
HGB BLD-MCNC: 10.7 G/DL (ref 11.5–15.4)
INR PPP: 2.52 (ref 0.84–1.19)
MCH RBC QN AUTO: 24 PG (ref 26.8–34.3)
MCHC RBC AUTO-ENTMCNC: 29.1 G/DL (ref 31.4–37.4)
MCV RBC AUTO: 83 FL (ref 82–98)
PLATELET # BLD AUTO: 412 THOUSANDS/UL (ref 149–390)
PMV BLD AUTO: 8.7 FL (ref 8.9–12.7)
POTASSIUM SERPL-SCNC: 3.5 MMOL/L (ref 3.5–5.3)
PROTHROMBIN TIME: 26.5 SECONDS (ref 11.6–14.5)
RBC # BLD AUTO: 4.46 MILLION/UL (ref 3.81–5.12)
SODIUM SERPL-SCNC: 136 MMOL/L (ref 135–147)
VANCOMYCIN SERPL-MCNC: 24.7 UG/ML (ref 10–20)
WBC # BLD AUTO: 10.23 THOUSAND/UL (ref 4.31–10.16)

## 2022-06-04 PROCEDURE — 99232 SBSQ HOSP IP/OBS MODERATE 35: CPT | Performed by: INTERNAL MEDICINE

## 2022-06-04 PROCEDURE — 80048 BASIC METABOLIC PNL TOTAL CA: CPT | Performed by: PHYSICIAN ASSISTANT

## 2022-06-04 PROCEDURE — 85610 PROTHROMBIN TIME: CPT | Performed by: PHYSICIAN ASSISTANT

## 2022-06-04 PROCEDURE — 80202 ASSAY OF VANCOMYCIN: CPT | Performed by: PHYSICIAN ASSISTANT

## 2022-06-04 PROCEDURE — 99232 SBSQ HOSP IP/OBS MODERATE 35: CPT | Performed by: SURGERY

## 2022-06-04 PROCEDURE — 85027 COMPLETE CBC AUTOMATED: CPT | Performed by: PHYSICIAN ASSISTANT

## 2022-06-04 RX ADMIN — Medication 125 MG: at 17:14

## 2022-06-04 RX ADMIN — WARFARIN SODIUM 5 MG: 5 TABLET ORAL at 17:14

## 2022-06-04 RX ADMIN — Medication 250 MG: at 17:14

## 2022-06-04 RX ADMIN — ACETAMINOPHEN 650 MG: 325 TABLET ORAL at 14:46

## 2022-06-04 RX ADMIN — Medication 125 MG: at 05:04

## 2022-06-04 RX ADMIN — ALLOPURINOL 100 MG: 100 TABLET ORAL at 09:57

## 2022-06-04 RX ADMIN — LEVOTHYROXINE SODIUM 125 MCG: 25 TABLET ORAL at 09:57

## 2022-06-04 RX ADMIN — Medication 125 MG: at 12:46

## 2022-06-04 RX ADMIN — TRIAMCINOLONE ACETONIDE: 1 CREAM TOPICAL at 09:58

## 2022-06-04 RX ADMIN — CEFEPIME HYDROCHLORIDE 2000 MG: 2 INJECTION, SOLUTION INTRAVENOUS at 12:46

## 2022-06-04 RX ADMIN — Medication 250 MG: at 09:57

## 2022-06-04 RX ADMIN — DULOXETINE 20 MG: 20 CAPSULE, DELAYED RELEASE ORAL at 09:58

## 2022-06-04 RX ADMIN — FUROSEMIDE 40 MG: 10 INJECTION, SOLUTION INTRAMUSCULAR; INTRAVENOUS at 09:57

## 2022-06-04 RX ADMIN — FUROSEMIDE 40 MG: 10 INJECTION, SOLUTION INTRAMUSCULAR; INTRAVENOUS at 17:14

## 2022-06-04 RX ADMIN — CEFEPIME HYDROCHLORIDE 2000 MG: 2 INJECTION, SOLUTION INTRAVENOUS at 02:27

## 2022-06-04 NOTE — PLAN OF CARE
Problem: Potential for Falls  Goal: Patient will remain free of falls  Description: INTERVENTIONS:  - Educate patient/family on patient safety including physical limitations  - Instruct patient to call for assistance with activity   - Consult OT/PT to assist with strengthening/mobility   - Keep Call bell within reach  - Keep bed low and locked with side rails adjusted as appropriate  - Keep care items and personal belongings within reach  - Initiate and maintain comfort rounds  - Make Fall Risk Sign visible to staff  - Offer Toileting in advance of need  - Initiate/Maintain bed alarm  - Obtain necessary fall risk management equipment:   - Apply yellow socks and bracelet for high fall risk patients  - Consider moving patient to room near nurses station  6/4/2022 1133 by Cholo Love RN  Outcome: Progressing  6/4/2022 1132 by Cholo Love RN  Outcome: Progressing     Problem: PAIN - ADULT  Goal: Verbalizes/displays adequate comfort level or baseline comfort level  Description: Interventions:  - Encourage patient to monitor pain and request assistance  - Assess pain using appropriate pain scale  - Administer analgesics based on type and severity of pain and evaluate response  - Implement non-pharmacological measures as appropriate and evaluate response  - Consider cultural and social influences on pain and pain management  - Notify physician/advanced practitioner if interventions unsuccessful or patient reports new pain  6/4/2022 1133 by Cholo Love RN  Outcome: Progressing  6/4/2022 1132 by Cholo Love RN  Outcome: Progressing     Problem: INFECTION - ADULT  Goal: Absence or prevention of progression during hospitalization  Description: INTERVENTIONS:  - Assess and monitor for signs and symptoms of infection  - Monitor lab/diagnostic results  - Monitor all insertion sites, i e  indwelling lines, tubes, and drains  - Monitor endotracheal if appropriate and nasal secretions for changes in amount and color  - Melvin appropriate cooling/warming therapies per order  - Administer medications as ordered  - Instruct and encourage patient and family to use good hand hygiene technique  - Identify and instruct in appropriate isolation precautions for identified infection/condition  6/4/2022 1133 by Navjot Torres RN  Outcome: Progressing  6/4/2022 1132 by Navjot Torres RN  Outcome: Progressing  Goal: Absence of fever/infection during neutropenic period  Description: INTERVENTIONS:  - Monitor WBC    6/4/2022 1133 by Navjot Torres RN  Outcome: Progressing  6/4/2022 1132 by Navjot Torres RN  Outcome: Progressing     Problem: SAFETY ADULT  Goal: Maintain or return to baseline ADL function  Description: INTERVENTIONS:  -  Assess patient's ability to carry out ADLs; assess patient's baseline for ADL function and identify physical deficits which impact ability to perform ADLs (bathing, care of mouth/teeth, toileting, grooming, dressing, etc )  - Assess/evaluate cause of self-care deficits   - Assess range of motion  - Assess patient's mobility; develop plan if impaired  - Assess patient's need for assistive devices and provide as appropriate  - Encourage maximum independence but intervene and supervise when necessary  - Involve family in performance of ADLs  - Assess for home care needs following discharge   - Consider OT consult to assist with ADL evaluation and planning for discharge  - Provide patient education as appropriate  6/4/2022 1133 by Navjot Torres RN  Outcome: Progressing  6/4/2022 1132 by Navjot Torres RN  Outcome: Progressing  Goal: Maintains/Returns to pre admission functional level  Description: INTERVENTIONS:  - Perform BMAT or MOVE assessment daily    - Set and communicate daily mobility goal to care team and patient/family/caregiver  - Collaborate with rehabilitation services on mobility goals if consulted  - Reposition patient every two hours    - Record patient progress and toleration of activity level   6/4/2022 1133 by Heidi Aguero RN  Outcome: Progressing  6/4/2022 1132 by Heidi Aguero RN  Outcome: Progressing     Problem: DISCHARGE PLANNING  Goal: Discharge to home or other facility with appropriate resources  Description: INTERVENTIONS:  - Identify barriers to discharge w/patient and caregiver  - Arrange for needed discharge resources and transportation as appropriate  - Identify discharge learning needs (meds, wound care, etc )  - Refer to Case Management Department for coordinating discharge planning if the patient needs post-hospital services based on physician/advanced practitioner order or complex needs related to functional status, cognitive ability, or social support system  6/4/2022 1133 by Heidi Aguero RN  Outcome: Progressing  6/4/2022 1132 by Heidi Aguero RN  Outcome: Progressing     Problem: Knowledge Deficit  Goal: Patient/family/caregiver demonstrates understanding of disease process, treatment plan, medications, and discharge instructions  Description: Complete learning assessment and assess knowledge base    Interventions:  - Provide teaching at level of understanding  - Provide teaching via preferred learning methods  6/4/2022 1133 by Heidi Aguero RN  Outcome: Progressing  6/4/2022 1132 by Heidi Aguero RN  Outcome: Progressing     Problem: SKIN/TISSUE INTEGRITY - ADULT  Goal: Skin Integrity remains intact(Skin Breakdown Prevention)  Description: Assess:  -Perform Gucci assessment every shift  -Clean and moisturize skin   -Inspect skin when repositioning, toileting, and assisting with ADLS  -Assess under medical devices   -Assess extremities for adequate circulation and sensation     Bed Management:  -Have minimal linens on bed & keep smooth, unwrinkled  -Change linens as needed when moist or perspiring  -Avoid sitting or lying in one position while in bed    Toileting:  -Offer bedside commode  -Assess for incontinence   -Use incontinent care products after each incontinent episode    Activity:  -Encourage or provide ROM exercises   -Turn and reposition patient every two Hours  -Use appropriate equipment to lift or move patient in bed  -Instruct/ Assist with weight shifting when out of bed in chair  -Consider limitation of chair time     Skin Care:  -Avoid use of baby powder, tape, friction and shearing, hot water or constrictive clothing  -Relieve pressure over bony prominences  -Do not massage red bony areas    Next Steps:  -Teach patient strategies to minimize risks    -Consider consults to  interdisciplinary teams   6/4/2022 1133 by Jolynn Son RN  Outcome: Progressing  6/4/2022 1132 by Jolynn Son RN  Outcome: Progressing  Goal: Incision(s), wounds(s) or drain site(s) healing without S/S of infection  Description: INTERVENTIONS  - Assess and document dressing, incision, wound bed, drain sites and surrounding tissue  - Provide patient and family education  - Perform skin care/dressing changes per order  6/4/2022 1133 by Jolynn Son RN  Outcome: Progressing  6/4/2022 1132 by Jolynn Son RN  Outcome: Progressing  Goal: Pressure injury heals and does not worsen  Description: Interventions:  - Implement low air loss mattress or specialty surface (Criteria met)  - Apply silicone foam dressing  - Instruct/assist with weight shifting when in chair   - Limit chair time   - Use special pressure reducing interventions  when in chair   - Apply fecal or urinary incontinence containment device   - Perform passive or active ROM   - Turn and reposition patient & offload bony prominences every two hours   - Utilize friction reducing device or surface for transfers   - Consider consults to  interdisciplinary teams   - Use incontinent care products after each incontinent episode  - Consider nutrition services referral as needed  6/4/2022 1133 by Jolynn Son RN  Outcome: Progressing  6/4/2022 [phone] by Jolynn Son RN  Outcome: Progressing     Problem: MOBILITY - ADULT  Goal: Maintain or return to baseline ADL function  Description: INTERVENTIONS:  -  Assess patient's ability to carry out ADLs; assess patient's baseline for ADL function and identify physical deficits which impact ability to perform ADLs (bathing, care of mouth/teeth, toileting, grooming, dressing, etc )  - Assess/evaluate cause of self-care deficits   - Assess range of motion  - Assess patient's mobility; develop plan if impaired  - Assess patient's need for assistive devices and provide as appropriate  - Encourage maximum independence but intervene and supervise when necessary  - Involve family in performance of ADLs  - Assess for home care needs following discharge   - Consider OT consult to assist with ADL evaluation and planning for discharge  - Provide patient education as appropriate  6/4/2022 1133 by Brii Rivera RN  Outcome: Progressing  6/4/2022 1132 by Brii Rivera RN  Outcome: Progressing  Goal: Maintains/Returns to pre admission functional level  Description: INTERVENTIONS:  - Perform BMAT or MOVE assessment daily    - Set and communicate daily mobility goal to care team and patient/family/caregiver  - Collaborate with rehabilitation services on mobility goals if consulted  - Reposition patient every two hours    - Record patient progress and toleration of activity level   6/4/2022 1133 by Brii Rivera RN  Outcome: Progressing  6/4/2022 1132 by Brii Rivera RN  Outcome: Progressing     Problem: Prexisting or High Potential for Compromised Skin Integrity  Goal: Skin integrity is maintained or improved  Description: INTERVENTIONS:  - Identify patients at risk for skin breakdown  - Assess and monitor skin integrity  - Assess and monitor nutrition and hydration status  - Monitor labs   - Assess for incontinence   - Turn and reposition patient  - Assist with mobility/ambulation  - Relieve pressure over bony prominences  - Avoid friction and shearing  - Provide appropriate hygiene as needed including keeping skin clean and dry  - Evaluate need for skin moisturizer/barrier cream  - Collaborate with interdisciplinary team   - Patient/family teaching  - Consider wound care consult   6/4/2022 1133 by Asia Lion RN  Outcome: Progressing  6/4/2022 1132 by Asia Lion RN  Outcome: Progressing

## 2022-06-04 NOTE — ASSESSMENT & PLAN NOTE
· Continue prophylactic oral vancomycin x72 hours beyond systemic antibiotics  · Continue probiotics while on systemic antibiotics

## 2022-06-04 NOTE — ASSESSMENT & PLAN NOTE
· Positive blood culture may be contaminant as is coag negative staph  · Does have history of MRSE bacteremia  · Appreciate infectious disease evaluation  · Low suspicion for true bacteremia and endovascular infection  · Continue vancomycin for panniculitis, monitor trough   · Repeat blood cultures yesterday no growth to date   If positive, will need TIANA otherwise anticipate 10 day course of tx through 6/5

## 2022-06-04 NOTE — PLAN OF CARE
Problem: Potential for Falls  Goal: Patient will remain free of falls  Description: INTERVENTIONS:  - Educate patient/family on patient safety including physical limitations  - Instruct patient to call for assistance with activity   - Consult OT/PT to assist with strengthening/mobility   - Keep Call bell within reach  - Keep bed low and locked with side rails adjusted as appropriate  - Keep care items and personal belongings within reach  - Initiate and maintain comfort rounds  - Make Fall Risk Sign visible to staff  - Offer Toileting in advance of need  - Initiate/Maintain bed alarm  - Obtain necessary fall risk management equipment:   - Apply yellow socks and bracelet for high fall risk patients  - Consider moving patient to room near nurses station  Outcome: Progressing     Problem: PAIN - ADULT  Goal: Verbalizes/displays adequate comfort level or baseline comfort level  Description: Interventions:  - Encourage patient to monitor pain and request assistance  - Assess pain using appropriate pain scale  - Administer analgesics based on type and severity of pain and evaluate response  - Implement non-pharmacological measures as appropriate and evaluate response  - Consider cultural and social influences on pain and pain management  - Notify physician/advanced practitioner if interventions unsuccessful or patient reports new pain  Outcome: Progressing     Problem: INFECTION - ADULT  Goal: Absence or prevention of progression during hospitalization  Description: INTERVENTIONS:  - Assess and monitor for signs and symptoms of infection  - Monitor lab/diagnostic results  - Monitor all insertion sites, i e  indwelling lines, tubes, and drains  - Monitor endotracheal if appropriate and nasal secretions for changes in amount and color  - [place] appropriate cooling/warming therapies per order  - Administer medications as ordered  - Instruct and encourage patient and family to use good hand hygiene technique  - Identify and instruct in appropriate isolation precautions for identified infection/condition  Outcome: Progressing  Goal: Absence of fever/infection during neutropenic period  Description: INTERVENTIONS:  - Monitor WBC    Outcome: Progressing     Problem: SAFETY ADULT  Goal: Maintain or return to baseline ADL function  Description: INTERVENTIONS:  -  Assess patient's ability to carry out ADLs; assess patient's baseline for ADL function and identify physical deficits which impact ability to perform ADLs (bathing, care of mouth/teeth, toileting, grooming, dressing, etc )  - Assess/evaluate cause of self-care deficits   - Assess range of motion  - Assess patient's mobility; develop plan if impaired  - Assess patient's need for assistive devices and provide as appropriate  - Encourage maximum independence but intervene and supervise when necessary  - Involve family in performance of ADLs  - Assess for home care needs following discharge   - Consider OT consult to assist with ADL evaluation and planning for discharge  - Provide patient education as appropriate  Outcome: Progressing  Goal: Maintains/Returns to pre admission functional level  Description: INTERVENTIONS:  - Perform BMAT or MOVE assessment daily    - Set and communicate daily mobility goal to care team and patient/family/caregiver  - Collaborate with rehabilitation services on mobility goals if consulted  - Reposition patient every two hours    - Record patient progress and toleration of activity level   Outcome: Progressing     Problem: DISCHARGE PLANNING  Goal: Discharge to home or other facility with appropriate resources  Description: INTERVENTIONS:  - Identify barriers to discharge w/patient and caregiver  - Arrange for needed discharge resources and transportation as appropriate  - Identify discharge learning needs (meds, wound care, etc )  - Refer to Case Management Department for coordinating discharge planning if the patient needs post-hospital services based on physician/advanced practitioner order or complex needs related to functional status, cognitive ability, or social support system  Outcome: Progressing     Problem: Knowledge Deficit  Goal: Patient/family/caregiver demonstrates understanding of disease process, treatment plan, medications, and discharge instructions  Description: Complete learning assessment and assess knowledge base    Interventions:  - Provide teaching at level of understanding  - Provide teaching via preferred learning methods  Outcome: Progressing     Problem: SKIN/TISSUE INTEGRITY - ADULT  Goal: Skin Integrity remains intact(Skin Breakdown Prevention)  Description: Assess:  -Perform Gucci assessment every shift  -Clean and moisturize skin   -Inspect skin when repositioning, toileting, and assisting with ADLS  -Assess under medical devices   -Assess extremities for adequate circulation and sensation     Bed Management:  -Have minimal linens on bed & keep smooth, unwrinkled  -Change linens as needed when moist or perspiring  -Avoid sitting or lying in one position while in bed    Toileting:  -Offer bedside commode  -Assess for incontinence   -Use incontinent care products after each incontinent episode    Activity:  -Encourage or provide ROM exercises   -Turn and reposition patient every two Hours  -Use appropriate equipment to lift or move patient in bed  -Instruct/ Assist with weight shifting when out of bed in chair  -Consider limitation of chair time     Skin Care:  -Avoid use of baby powder, tape, friction and shearing, hot water or constrictive clothing  -Relieve pressure over bony prominences  -Do not massage red bony areas    Next Steps:  -Teach patient strategies to minimize risks    -Consider consults to  interdisciplinary teams   Outcome: Progressing  Goal: Incision(s), wounds(s) or drain site(s) healing without S/S of infection  Description: INTERVENTIONS  - Assess and document dressing, incision, wound bed, drain sites and surrounding tissue  - Provide patient and family education  - Perform skin care/dressing changes per order  Outcome: Progressing  Goal: Pressure injury heals and does not worsen  Description: Interventions:  - Implement low air loss mattress or specialty surface (Criteria met)  - Apply silicone foam dressing  - Instruct/assist with weight shifting when in chair   - Limit chair time   - Use special pressure reducing interventions  when in chair   - Apply fecal or urinary incontinence containment device   - Perform passive or active ROM   - Turn and reposition patient & offload bony prominences every two hours   - Utilize friction reducing device or surface for transfers   - Consider consults to  interdisciplinary teams   - Use incontinent care products after each incontinent episode  - Consider nutrition services referral as needed  Outcome: Progressing     Problem: MOBILITY - ADULT  Goal: Maintain or return to baseline ADL function  Description: INTERVENTIONS:  -  Assess patient's ability to carry out ADLs; assess patient's baseline for ADL function and identify physical deficits which impact ability to perform ADLs (bathing, care of mouth/teeth, toileting, grooming, dressing, etc )  - Assess/evaluate cause of self-care deficits   - Assess range of motion  - Assess patient's mobility; develop plan if impaired  - Assess patient's need for assistive devices and provide as appropriate  - Encourage maximum independence but intervene and supervise when necessary  - Involve family in performance of ADLs  - Assess for home care needs following discharge   - Consider OT consult to assist with ADL evaluation and planning for discharge  - Provide patient education as appropriate  Outcome: Progressing  Goal: Maintains/Returns to pre admission functional level  Description: INTERVENTIONS:  - Perform BMAT or MOVE assessment daily    - Set and communicate daily mobility goal to care team and patient/family/caregiver     - Collaborate with rehabilitation services on mobility goals if consulted  - Reposition patient every two hours    - Record patient progress and toleration of activity level   Outcome: Progressing     Problem: Prexisting or High Potential for Compromised Skin Integrity  Goal: Skin integrity is maintained or improved  Description: INTERVENTIONS:  - Identify patients at risk for skin breakdown  - Assess and monitor skin integrity  - Assess and monitor nutrition and hydration status  - Monitor labs   - Assess for incontinence   - Turn and reposition patient  - Assist with mobility/ambulation  - Relieve pressure over bony prominences  - Avoid friction and shearing  - Provide appropriate hygiene as needed including keeping skin clean and dry  - Evaluate need for skin moisturizer/barrier cream  - Collaborate with interdisciplinary team   - Patient/family teaching  - Consider wound care consult   Outcome: Progressing

## 2022-06-04 NOTE — PROGRESS NOTES
[facility]  Progress Note - Veronika Shetty 1950, 80 y o  female MRN: [id number]  Unit/Bed#: -01 Encounter: [id number]  Primary Care Provider: ORACIO Ovalles   Date and time admitted to hospital: 5/27/2022  7:52 PM    BMI 70 and over, adult [facility])  Assessment & Plan  · Continue supportive care  · Risk factor for recurrent infections    Positive blood culture  Assessment & Plan  · Positive blood culture may be contaminant as is coag negative staph  · Does have history of MRSE bacteremia  · Appreciate infectious disease evaluation  · Low suspicion for true bacteremia and endovascular infection  · Continue vancomycin for panniculitis, monitor trough   · Repeat blood cultures yesterday no growth to date  If positive, will need TIANA otherwise anticipate 10 day course of tx through 6/5    Chronic atrial fibrillation ([facility] [address] )  Assessment & Plan  · Intrinsically controlled  · INR had been supratherapeutic now within limits  Warfarin has been restarted      Results from last 7 days   Lab Units 06/04/22  0441 06/03/22  0407 06/02/22  0505 06/01/22  0434 05/31/22  0446 05/30/22  0427 05/29/22  0436   INR  2 52* 2 21* 2 14* 2 27* 2 37* 3 22* 3 91*       History of Clostridioides difficile infection  Assessment & Plan  · Continue prophylactic oral vancomycin x72 hours beyond systemic antibiotics  · Continue probiotics while on systemic antibiotics    Stage 3a chronic kidney disease (HCC)  Assessment & Plan  · Creatinine appears stable    Chronic diastolic congestive heart failure (HCC)  Assessment & Plan  Wt Readings from Last 3 Encounters:   06/04/22 (!) 201 kg (442 lb 3 9 oz)   05/08/22 (!) 212 kg (467 lb)   05/06/22 (!) 193 kg (425 lb)     · Continue IV diuresis for lower extremity swelling and panniculitis  · Continue low Na/fluid restriction, daily weights/I&Os    Other specified hypothyroidism  Assessment & Plan  · Continue levothyroxine    * Panniculitis  Assessment & Plan  64-year-old female with morbid obesity and diastolic CHF recently hospitalized at [address] [place] [address] who re-presented for recurrent cellulitis of pannus in setting of morbid obesity, chronic lymphedema, increased moisture and superficial skin breakdown  · Appreciate infectious disease input  · Wound culture with pseudomonas  · Given positive blood cultures currently on IV vancomycin -> continue for 10 days until   · Added cefepime per recommendations given growth on wound culture  Per ID, increase dose to 2 q8h until   · "if isolate is not fluoroquinolone susceptible will need midline placement, otherwise can transition to PO levaquin 750 daily on d/c"  · Surgery following, no abscess noted  · Continue aggressive skin care with minimizing friction and moisture related skin breakdown  Frequent offloading and repositioning         VTE Pharmacologic Prophylaxis:   Pharmacologic: Warfarin (Coumadin)  Mechanical VTE Prophylaxis in Place: Yes    Patient Centered Rounds: I have performed bedside rounds with nursing staff today  Discussions with Specialists or Other Care Team Provider: n/a    Education and Discussions with Family / Patient: patient    Time Spent for Care: 20 minutes  More than 50% of total time spent on counseling and coordination of care as described above  Current Length of Stay: 8 day(s)    Current Patient Status: Inpatient   Certification Statement: The patient will continue to require additional inpatient hospital stay due to bacteremia    Discharge Plan: pending hospital course    Code Status: Level 1 - Full Code      Subjective:   Patient seen examined  No acute events overnight  Objective:     Vitals:   Temp (24hrs), Av °F (36 7 °C), Min:97 9 °F (36 6 °C), Max:98 °F (36 7 °C)    Temp:  [97 9 °F (36 6 °C)-98 °F (36 7 °C)] 97 9 °F (36 6 °C)  HR:  [68-78] 69  Resp:  [16-20] 20  BP: (102-124)/(60-64) 106/64  SpO2:  [91 %-97 %] 91 %  Body mass index is 80 89 kg/m²       Input and Output Summary (last 24 hours): Intake/Output Summary (Last 24 hours) at 6/4/2022 1231  Last data filed at 6/4/2022 0900  Gross per 24 hour   Intake 420 ml   Output 400 ml   Net 20 ml       Physical Exam:     Physical Exam  Vitals and nursing note reviewed  Constitutional:       Appearance: Normal appearance  HENT:      Head: Normocephalic  Nose: Nose normal       Mouth/Throat:      Mouth: Mucous membranes are moist    Eyes:      Pupils: Pupils are equal, round, and reactive to light  Cardiovascular:      Rate and Rhythm: Normal rate and regular rhythm  Pulses: Normal pulses  Pulmonary:      Effort: Pulmonary effort is normal    Abdominal:      Comments: Obese  erythema   Musculoskeletal:      Cervical back: Normal range of motion  Skin:     General: Skin is warm  Capillary Refill: Capillary refill takes less than 2 seconds  Neurological:      General: No focal deficit present  Mental Status: She is alert and oriented to person, place, and time  Mental status is at baseline  Psychiatric:         Mood and Affect: Mood normal          Behavior: Behavior normal          Thought Content: Thought content normal          Judgment: Judgment normal          Additional Data:     Labs:    Results from last 7 days   Lab Units 06/04/22  0441 05/31/22  0446 05/30/22  0427   WBC Thousand/uL 10 23*   < > 7 67   HEMOGLOBIN g/dL 10 7*   < > 11 3*   HEMATOCRIT % 36 8   < > 38 5   PLATELETS Thousands/uL 412*   < > 414*   NEUTROS PCT %  --   --  74   LYMPHS PCT %  --   --  14   MONOS PCT %  --   --  7   EOS PCT %  --   --  4    < > = values in this interval not displayed       Results from last 7 days   Lab Units 06/04/22  0441 06/03/22  0407 06/02/22  0505   SODIUM mmol/L 136   < > 137   POTASSIUM mmol/L 3 5   < > 3 6   CHLORIDE mmol/L 95*   < > 97   CO2 mmol/L 30   < > 30   BUN mg/dL 30*   < > 28*   CREATININE mg/dL 1 19   < > 1 21   ANION GAP mmol/L 11   < > 10   CALCIUM mg/dL 8 6   < > 8 9 ALBUMIN g/dL  --   --  2 9*   TOTAL BILIRUBIN mg/dL  --   --  0 66   ALK PHOS U/L  --   --  100   ALT U/L  --   --  16   AST U/L  --   --  23   GLUCOSE RANDOM mg/dL 95   < > 131    < > = values in this interval not displayed  Results from last 7 days   Lab Units 06/04/22  0441   INR  2 52*                       * I Have Reviewed All Lab Data Listed Above  * Additional Pertinent Lab Tests Reviewed: [facility] Admission Reviewed    Imaging:    Imaging Reports Reviewed Today Include: n/a  Imaging Personally Reviewed by Myself Includes:  n/a    Recent Cultures (last 7 days):     Results from last 7 days   Lab Units 06/03/22  0406 05/31/22  1324   BLOOD CULTURE  No Growth at 24 hrs    No Growth at 24 hrs   --    GRAM STAIN RESULT   --  1+ Gram negative rods*  No polys seen*   WOUND CULTURE   --  3+ Growth of Pseudomonas aeruginosa*  3+ Growth of Pseudomonas aeruginosa MDR*  3+ Growth of Proteus mirabilis*  2+ Growth of        Last 24 Hours Medication List:   Current Facility-Administered Medications   Medication Dose Route Frequency Provider Last Rate    acetaminophen  650 mg Oral Q4H PRN Veto Moritz, PA-C      allopurinol  100 mg Oral Daily [place], [place]      cefepime  2,000 mg Intravenous Q12H Pérez Marquez DO 2,000 mg (06/04/22 0227)    DULoxetine  20 mg Oral Daily [place], [place]      furosemide  40 mg Intravenous BID (diuretic) Veto Moritz, PA-C      levothyroxine  125 mcg Oral Daily [place], [place]      ondansetron  4 mg Intravenous Q6H PRN Veto Moritz, PA-C      saccharomyces boulardii  250 mg Oral BID [place], [place]      triamcinolone   Topical BID [place], [place]      [START ON 6/5/2022] vancomycin  12 5 mg/kg (Adjusted) Intravenous Once PRN Veto Moritz, PA-C      vancomycin  125 mg Oral HOSP [facility] [place], [place]      warfarin  5 mg Oral Every Other Day Duey Riding, DO      And    warfarin  2 mg Oral Every Other Day Duey Riding, DO          Today, Patient Was Seen By: China Talbert MD    ** Please Note: Dictation voice to text software may have been used in the creation of this document   **

## 2022-06-04 NOTE — CONSULTS
Vancomycin Assessment    Layne Sage is a 80 y o  female who is currently receiving vancomycin 1500 mg IV pulse dosing if LVL < 20 for skin soft tissue infection  Relevant clinical data and objective history reviewed:  Creatinine   Date Value Ref Range Status   06/04/2022 1 19 0 60 - 1 30 mg/dL Final     Comment:     Standardized to IDMS reference method   06/03/2022 1 11 0 60 - 1 30 mg/dL Final     Comment:     Standardized to IDMS reference method   06/02/2022 1 21 0 60 - 1 30 mg/dL Final     Comment:     Standardized to IDMS reference method   05/19/2018 1 03 0 6 - 1 2 MG/DL Final     Comment:     IDMS method performed  The drugs Metamizole, Sulfasalazine, and Sulfapyridine may interfere and  result in false low results  Vancomycin Rm   Date Value Ref Range Status   06/04/2022 24 7 (H) 10 0 - 20 0 ug/mL Final     /64   Pulse 69   Temp 97 9 °F (36 6 °C)   Resp 20   Ht 5' 2" (1 575 m)   Wt (!) 201 kg (442 lb 3 9 oz)   LMP  (LMP Unknown)   SpO2 91%   BMI 80 89 kg/m²   I/O last 3 completed shifts: In: 480 [P O :480]  Out: 400 [Urine:400]  Lab Results   Component Value Date/Time    BUN 30 (H) 06/04/2022 04:41 AM    BUN 33 (H) 05/19/2018 06:15 PM    WBC 10 23 (H) 06/04/2022 04:41 AM    WBC 9 1 05/19/2018 06:15 PM    HGB 10 7 (L) 06/04/2022 04:41 AM    HGB 12 3 05/19/2018 06:15 PM    HCT 36 8 06/04/2022 04:41 AM    HCT 38 5 05/19/2018 06:15 PM    MCV 83 06/04/2022 04:41 AM    MCV 93 3 05/19/2018 06:15 PM     (H) 06/04/2022 04:41 AM     05/19/2018 06:15 PM     Temp Readings from Last 3 Encounters:   06/04/22 97 9 °F (36 6 °C)   05/13/22 97 7 °F (36 5 °C)   05/06/22 (!) 97 3 °F (36 3 °C)     Vancomycin Days of Therapy: 9    Assessment/Plan  The patient is currently on vancomycin utilizing pulse dosing   The patient is receiving 1500 mg IV pulse dosing if LVL < 20 with the most recent vancomycin level being (24 7) supratherapeutic based on a goal of 15-20 (appropriate for most indications) ; therefore, the patient will not receive a dose of vancomycin today  Pharmacy will continue to follow closely for s/sx of nephrotoxicity, infusion reactions, and appropriateness of therapy  BMP and CBC will be ordered per protocol  Next random vanco lvl will be drawn with AM labs on 6/5  Pharmacy will continue to follow the patients culture results and clinical progress daily      Char Ortiz, Pharmacist

## 2022-06-04 NOTE — PROGRESS NOTES
Progress Note - General Surgery   Zena Kennedy 80 y o  female MRN: [id number]  Unit/Bed#: -01 Encounter: [id number]    Assessment:  81-year-old female with super morbid obesity BMI of 80, currently admitted with panniculitis  Currently followed by ID who is managing antibiotics  White blood cell count slightly increased 10 2 from 9 7  On exam minimal erythema bilateral hip and pannus  Noted edema  No obvious abscess is noted  Recent ultrasound showing no obvious fluid collection but rather just significant edema  Plan:  - IV antibiotics as per infectious disease - wound culture growing Pseudomonas and Proteus  - daily exam to monitor improvement in cellulitis and to evaluate for abscess development  - remainder of comorbidities as per primary medical team     Subjective/Objective   Chief Complaint:  No new complaints    Subjective:  Feeling okay  No significant pain of the pannus on bilateral hips  No nausea vomiting fevers or chills  Objective:     Blood pressure 106/64, pulse 69, temperature 97 9 °F (36 6 °C), resp  rate 20, height 5' 2" (1 575 m), weight (!) 201 kg (442 lb 3 9 oz), SpO2 91 %  ,Body mass index is 80 89 kg/m²        Intake/Output Summary (Last 24 hours) at 6/4/2022 1230  Last data filed at 6/4/2022 0900  Gross per 24 hour   Intake 420 ml   Output 400 ml   Net 20 ml       Invasive Devices  Report    Peripheral Intravenous Line  Duration           Peripheral IV 05/28/22 Proximal;Right;Ventral (anterior) Forearm 6 days                Physical Exam:   General:  No acute distress, resting comfortably  HEENT:  Normocephalic, atraumatic trachea midline  CV:  S1-S2 audible, regular rate rhythm  Pulmonary:  No respiratory distress, no use of accessory muscles, no audible wheezing  GI:  Abdomen is morbidly obese, nontender, no guarding or rebound  MSK:  Lower extremities without clubbing or cyanosis  Skin soft tissue:  Bilateral lower portions of the pannus with some erythema along with the bilateral hips with minimal cellulitis noted  Mild serous drainage  No purulence noted  Neurologic:  Alert oriented x3, cranial 2-12 grossly intact  Psych:  Mood and affect appropriate            Lab, Imaging and other studies:  I have personally reviewed pertinent lab results    , CBC:   Lab Results   Component Value Date    WBC 10 23 (H) 06/04/2022    HGB 10 7 (L) 06/04/2022    HCT 36 8 06/04/2022    MCV 83 06/04/2022     (H) 06/04/2022    MCH 24 0 (L) 06/04/2022    MCHC 29 1 (L) 06/04/2022    RDW 16 7 (H) 06/04/2022    MPV 8 7 (L) 06/04/2022   , CMP:   Lab Results   Component Value Date    SODIUM 136 06/04/2022    K 3 5 06/04/2022    CL 95 (L) 06/04/2022    CO2 30 06/04/2022    BUN 30 (H) 06/04/2022    CREATININE 1 19 06/04/2022    CALCIUM 8 6 06/04/2022    EGFR 46 06/04/2022   , Coagulation:   Lab Results   Component Value Date    INR 2 52 (H) 06/04/2022   , Urinalysis: No results found for: COLORU, CLARITYU, SPECGRAV, PHUR, LEUKOCYTESUR, NITRITE, PROTEINUA, GLUCOSEU, KETONESU, BILIRUBINUR, BLOODU, Amylase: No results found for: AMYLASE, Lipase: No results found for: LIPASE  VTE Pharmacologic Prophylaxis: Warfarin (Coumadin)  VTE Mechanical Prophylaxis: sequential compression device

## 2022-06-04 NOTE — ASSESSMENT & PLAN NOTE
Wt Readings from Last 3 Encounters:   06/04/22 (!) 201 kg (442 lb 3 9 oz)   05/08/22 (!) 212 kg (467 lb)   05/06/22 (!) 193 kg (425 lb)     · Continue IV diuresis for lower extremity swelling and panniculitis  · Continue low Na/fluid restriction, daily weights/I&Os

## 2022-06-04 NOTE — ASSESSMENT & PLAN NOTE
· Intrinsically controlled  · INR had been supratherapeutic now within limits  Warfarin has been restarted      Results from last 7 days   Lab Units 06/04/22  0441 06/03/22  0407 06/02/22  0505 06/01/22  0434 05/31/22  0446 05/30/22  0427 05/29/22  0436   INR  2 52* 2 21* 2 14* 2 27* 2 37* 3 22* 3 91*

## 2022-06-04 NOTE — ASSESSMENT & PLAN NOTE
26-year-old female with morbid obesity and diastolic CHF recently hospitalized at [facility] who re-presented for recurrent cellulitis of pannus in setting of morbid obesity, chronic lymphedema, increased moisture and superficial skin breakdown  · Appreciate infectious disease input  · Wound culture with pseudomonas  · Given positive blood cultures currently on IV vancomycin -> continue for 10 days until 6/5  · Added cefepime per recommendations given growth on wound culture  Per ID, increase dose to 2 q8h until 6/9  · "if isolate is not fluoroquinolone susceptible will need midline placement, otherwise can transition to PO levaquin 750 daily on d/c"  · Surgery following, no abscess noted  · Continue aggressive skin care with minimizing friction and moisture related skin breakdown   Frequent offloading and repositioning

## 2022-06-05 LAB
ALBUMIN SERPL BCP-MCNC: 3 G/DL (ref 3.5–5)
ALP SERPL-CCNC: 91 U/L (ref 34–104)
ALT SERPL W P-5'-P-CCNC: 14 U/L (ref 7–52)
ANION GAP SERPL CALCULATED.3IONS-SCNC: 7 MMOL/L (ref 4–13)
AST SERPL W P-5'-P-CCNC: 19 U/L (ref 13–39)
BILIRUB SERPL-MCNC: 0.73 MG/DL (ref 0.2–1)
BUN SERPL-MCNC: 34 MG/DL (ref 5–25)
CALCIUM ALBUM COR SERPL-MCNC: 9.8 MG/DL (ref 8.3–10.1)
CALCIUM SERPL-MCNC: 9 MG/DL (ref 8.4–10.2)
CHLORIDE SERPL-SCNC: 95 MMOL/L (ref 96–108)
CO2 SERPL-SCNC: 31 MMOL/L (ref 21–32)
CREAT SERPL-MCNC: 1.29 MG/DL (ref 0.6–1.3)
ERYTHROCYTE [DISTWIDTH] IN BLOOD BY AUTOMATED COUNT: 16.5 % (ref 11.6–15.1)
GFR SERPL CREATININE-BSD FRML MDRD: 42 ML/MIN/1.73SQ M
GLUCOSE SERPL-MCNC: 130 MG/DL (ref 65–140)
HCT VFR BLD AUTO: 35.1 % (ref 34.8–46.1)
HGB BLD-MCNC: 10.5 G/DL (ref 11.5–15.4)
INR PPP: 2.74 (ref 0.84–1.19)
MCH RBC QN AUTO: 24.7 PG (ref 26.8–34.3)
MCHC RBC AUTO-ENTMCNC: 29.9 G/DL (ref 31.4–37.4)
MCV RBC AUTO: 83 FL (ref 82–98)
PLATELET # BLD AUTO: 380 THOUSANDS/UL (ref 149–390)
PMV BLD AUTO: 8.7 FL (ref 8.9–12.7)
POTASSIUM SERPL-SCNC: 3.4 MMOL/L (ref 3.5–5.3)
PROT SERPL-MCNC: 7.1 G/DL (ref 6.4–8.4)
PROTHROMBIN TIME: 28.3 SECONDS (ref 11.6–14.5)
RBC # BLD AUTO: 4.25 MILLION/UL (ref 3.81–5.12)
SODIUM SERPL-SCNC: 133 MMOL/L (ref 135–147)
VANCOMYCIN SERPL-MCNC: 19.8 UG/ML (ref 10–20)
WBC # BLD AUTO: 9.01 THOUSAND/UL (ref 4.31–10.16)

## 2022-06-05 PROCEDURE — NC001 PR NO CHARGE

## 2022-06-05 PROCEDURE — 99232 SBSQ HOSP IP/OBS MODERATE 35: CPT

## 2022-06-05 PROCEDURE — 85610 PROTHROMBIN TIME: CPT | Performed by: INTERNAL MEDICINE

## 2022-06-05 PROCEDURE — 99232 SBSQ HOSP IP/OBS MODERATE 35: CPT | Performed by: INTERNAL MEDICINE

## 2022-06-05 PROCEDURE — 80202 ASSAY OF VANCOMYCIN: CPT | Performed by: PHYSICIAN ASSISTANT

## 2022-06-05 PROCEDURE — 80053 COMPREHEN METABOLIC PANEL: CPT | Performed by: INTERNAL MEDICINE

## 2022-06-05 PROCEDURE — 85027 COMPLETE CBC AUTOMATED: CPT | Performed by: INTERNAL MEDICINE

## 2022-06-05 RX ADMIN — Medication 125 MG: at 05:40

## 2022-06-05 RX ADMIN — WARFARIN SODIUM 2 MG: 2 TABLET ORAL at 17:51

## 2022-06-05 RX ADMIN — TRIAMCINOLONE ACETONIDE: 1 CREAM TOPICAL at 10:07

## 2022-06-05 RX ADMIN — Medication 250 MG: at 10:07

## 2022-06-05 RX ADMIN — Medication 125 MG: at 00:50

## 2022-06-05 RX ADMIN — FUROSEMIDE 40 MG: 10 INJECTION, SOLUTION INTRAMUSCULAR; INTRAVENOUS at 10:07

## 2022-06-05 RX ADMIN — DULOXETINE 20 MG: 20 CAPSULE, DELAYED RELEASE ORAL at 10:07

## 2022-06-05 RX ADMIN — FUROSEMIDE 40 MG: 10 INJECTION, SOLUTION INTRAMUSCULAR; INTRAVENOUS at 17:50

## 2022-06-05 RX ADMIN — Medication 125 MG: at 17:57

## 2022-06-05 RX ADMIN — CEFEPIME HYDROCHLORIDE 2000 MG: 2 INJECTION, SOLUTION INTRAVENOUS at 00:50

## 2022-06-05 RX ADMIN — Medication 125 MG: at 12:45

## 2022-06-05 RX ADMIN — Medication 250 MG: at 17:50

## 2022-06-05 RX ADMIN — CEFEPIME HYDROCHLORIDE 2000 MG: 2 INJECTION, SOLUTION INTRAVENOUS at 12:45

## 2022-06-05 RX ADMIN — ACETAMINOPHEN 650 MG: 325 TABLET ORAL at 12:56

## 2022-06-05 RX ADMIN — ALLOPURINOL 100 MG: 100 TABLET ORAL at 10:07

## 2022-06-05 RX ADMIN — LEVOTHYROXINE SODIUM 125 MCG: 25 TABLET ORAL at 10:07

## 2022-06-05 NOTE — PROGRESS NOTES
[facility] [address]  Progress Note - [facility] 1950, 80 y o  female MRN: [id number]  Unit/Bed#: -01 Encounter: [id number]  Primary Care Provider: ORACIO Scott   Date and time admitted to hospital: 5/27/2022  7:52 PM    BMI 70 and over, adult [facility])  Assessment & Plan  · Continue supportive care  · Risk factor for recurrent infections    Positive blood culture  Assessment & Plan  · Positive blood culture may be contaminant as is coag negative staph  · Does have history of MRSE bacteremia  · Appreciate infectious disease evaluation  · Low suspicion for true bacteremia and endovascular infection  · Completed vancomycin for panniculitis  · Repeat blood cultures no growth to date  Finish 10 day course of tx through 6/5    Chronic atrial fibrillation ([facility] [address] )  Assessment & Plan  · Intrinsically controlled  · INR had been supratherapeutic now within limits  Warfarin has been restarted      Results from last 7 days   Lab Units 06/05/22  0427 06/04/22  0441 06/03/22  0407 06/02/22  0505 06/01/22  0434 05/31/22  0446 05/30/22  0427   INR  2 74* 2 52* 2 21* 2 14* 2 27* 2 37* 3 22*       History of Clostridioides difficile infection  Assessment & Plan  · Continue prophylactic oral vancomycin x72 hours beyond systemic antibiotics  · Continue probiotics while on systemic antibiotics    Stage 3a chronic kidney disease (HCC)  Assessment & Plan  · Creatinine appears stable    Chronic diastolic congestive heart failure (HCC)  Assessment & Plan  Wt Readings from Last 3 Encounters:   06/05/22 (!) 200 kg (441 lb 12 8 oz)   05/08/22 (!) 212 kg (467 lb)   05/06/22 (!) 193 kg (425 lb)     · Continue IV diuresis for lower extremity swelling and panniculitis  · Continue low Na/fluid restriction, daily weights/I&Os    Other specified hypothyroidism  Assessment & Plan  · Continue levothyroxine    * Panniculitis  Assessment & Plan  73-year-old female with morbid obesity and diastolic CHF recently hospitalized at [facility] who re-presented for recurrent cellulitis of pannus in setting of morbid obesity, chronic lymphedema, increased moisture and superficial skin breakdown  · Appreciate infectious disease input  · Wound culture with pseudomonas and proteus  · Given positive blood cultures currently on IV vancomycin -> finished 10 day course on   · Added cefepime per recommendations given growth on wound culture  Per ID, increase dose to 2 q8h until   · "if isolate is not fluoroquinolone susceptible will need midline placement, otherwise can transition to PO levaquin 750 daily on d/c"  · Surgery following, no abscess noted  · Continue aggressive skin care with minimizing friction and moisture related skin breakdown  Frequent offloading and repositioning           VTE Pharmacologic Prophylaxis:   Pharmacologic: Warfarin (Coumadin)  Mechanical VTE Prophylaxis in Place: Yes    Patient Centered Rounds: I have performed bedside rounds with nursing staff today  Discussions with Specialists or Other Care Team Provider: n/a    Education and Discussions with Family / Patient: patient    Time Spent for Care: 20 minutes  More than 50% of total time spent on counseling and coordination of care as described above  Current Length of Stay: 9 day(s)    Current Patient Status: Inpatient   Certification Statement: The patient will continue to require additional inpatient hospital stay due to panniculitis    Discharge Plan: pending hospital course    Code Status: Level 1 - Full Code      Subjective:   Patient seen examined  No acute events overnight  Feels well  Objective:     Vitals:   Temp (24hrs), Av 9 °F (36 6 °C), Min:97 8 °F (36 6 °C), Max:97 9 °F (36 6 °C)    Temp:  [97 8 °F (36 6 °C)-97 9 °F (36 6 °C)] 97 9 °F (36 6 °C)  HR:  [65-79] 65  Resp:  [18] 18  BP: ()/(51-60) 95/51  SpO2:  [94 %-97 %] 96 %  Body mass index is 80 81 kg/m²       Input and Output Summary (last 24 hours): Intake/Output Summary (Last 24 hours) at 6/5/2022 0953  Last data filed at 6/4/2022 1900  Gross per 24 hour   Intake 480 ml   Output --   Net 480 ml       Physical Exam:     Physical Exam  Vitals and nursing note reviewed  Constitutional:       Appearance: Normal appearance  HENT:      Head: Normocephalic and atraumatic  Nose: Nose normal       Mouth/Throat:      Mouth: Mucous membranes are moist    Eyes:      Extraocular Movements: Extraocular movements intact  Pupils: Pupils are equal, round, and reactive to light  Cardiovascular:      Rate and Rhythm: Normal rate and regular rhythm  Pulses: Normal pulses  Heart sounds: Normal heart sounds  Pulmonary:      Effort: Pulmonary effort is normal       Breath sounds: Normal breath sounds  Abdominal:      Comments: Obese, panniculitis   Musculoskeletal:         General: Swelling present  Cervical back: Neck supple  Skin:     General: Skin is warm  Capillary Refill: Capillary refill takes less than 2 seconds  Findings: Erythema present  Neurological:      General: No focal deficit present  Mental Status: She is alert and oriented to person, place, and time  Mental status is at baseline  Psychiatric:         Mood and Affect: Mood normal          Thought Content: Thought content normal          Judgment: Judgment normal            Additional Data:     Labs:    Results from last 7 days   Lab Units 06/05/22 0427 05/31/22  0446 05/30/22 0427   WBC Thousand/uL 9 01   < > 7 67   HEMOGLOBIN g/dL 10 5*   < > 11 3*   HEMATOCRIT % 35 1   < > 38 5   PLATELETS Thousands/uL 380   < > 414*   NEUTROS PCT %  --   --  74   LYMPHS PCT %  --   --  14   MONOS PCT %  --   --  7   EOS PCT %  --   --  4    < > = values in this interval not displayed       Results from last 7 days   Lab Units 06/05/22 0427   SODIUM mmol/L 133*   POTASSIUM mmol/L 3 4*   CHLORIDE mmol/L 95*   CO2 mmol/L 31   BUN mg/dL 34*   CREATININE mg/dL 1 29 ANION GAP mmol/L 7   CALCIUM mg/dL 9 0   ALBUMIN g/dL 3 0*   TOTAL BILIRUBIN mg/dL 0 73   ALK PHOS U/L 91   ALT U/L 14   AST U/L 19   GLUCOSE RANDOM mg/dL 130     Results from last 7 days   Lab Units 06/05/22  0427   INR  2 74*                       * I Have Reviewed All Lab Data Listed Above  * Additional Pertinent Lab Tests Reviewed: [facility] 67 Admission Reviewed    Imaging:    Imaging Reports Reviewed Today Include: n/a  Imaging Personally Reviewed by Myself Includes:  n/a    Recent Cultures (last 7 days):     Results from last 7 days   Lab Units 06/03/22  0406 05/31/22  1324   BLOOD CULTURE  No Growth at 48 hrs    No Growth at 48 hrs   --    GRAM STAIN RESULT   --  1+ Gram negative rods*  No polys seen*   WOUND CULTURE   --  3+ Growth of Pseudomonas aeruginosa*  3+ Growth of Pseudomonas aeruginosa MDR*  3+ Growth of Proteus mirabilis*  2+ Growth of        Last 24 Hours Medication List:   Current Facility-Administered Medications   Medication Dose Route Frequency Provider Last Rate    acetaminophen  650 mg Oral Q4H PRN Vinicius Cornelius PA-C      allopurinol  100 mg Oral Daily [place], [place]      cefepime  2,000 mg Intravenous Q12H Pérez Marquez, DO 2,000 mg (06/05/22 0050)    DULoxetine  20 mg Oral Daily [place], [place]      furosemide  40 mg Intravenous BID (diuretic) Vinicius Cornelius PA-C      levothyroxine  125 mcg Oral Daily [place], [place]      ondansetron  4 mg Intravenous Q6H PRN Vinicius Cornelius PA-C      saccharomyces boulardii  250 mg Oral BID Meka Pillai      triamcinolone   Topical BID [place], [place]      vancomycin  125 mg Oral [facility] [place], [place]      warfarin  5 mg Oral Every Other Day Izaiah Collazo DO      And    warfarin  2 mg Oral Every Other Day Izaiah Collazo DO          Today, Patient Was Seen By: Panfilo Soriano MD    ** Please Note: Dictation voice to text software may have been used in the creation of this document   **

## 2022-06-05 NOTE — CONSULTS
Vancomycin IV Pharmacy-to-Dose Consultation    Rhona Case is a 80 y o  female who was receiving Vancomycin IV with management by the Pharmacy Consult service for the treatment of skin-soft tissue infection, MRSA suspected    The patient's Vancomycin therapy has been completed  Thank you for allowing us to take part in this patient's care  Pharmacy will sign-off now; please call or re-consult if there are any questions          Jimy Harrington RP

## 2022-06-05 NOTE — PLAN OF CARE
Problem: Potential for Falls  Goal: Patient will remain free of falls  Description: INTERVENTIONS:  - Educate patient/family on patient safety including physical limitations  - Instruct patient to call for assistance with activity   - Consult OT/PT to assist with strengthening/mobility   - Keep Call bell within reach  - Keep bed low and locked with side rails adjusted as appropriate  - Keep care items and personal belongings within reach  - Initiate and maintain comfort rounds  - Make Fall Risk Sign visible to staff  - Offer Toileting in advance of need  - Initiate/Maintain bed alarm  - Obtain necessary fall risk management equipment:   - Apply yellow socks and bracelet for high fall risk patients  - Consider moving patient to room near nurses station  Outcome: Progressing     Problem: PAIN - ADULT  Goal: Verbalizes/displays adequate comfort level or baseline comfort level  Description: Interventions:  - Encourage patient to monitor pain and request assistance  - Assess pain using appropriate pain scale  - Administer analgesics based on type and severity of pain and evaluate response  - Implement non-pharmacological measures as appropriate and evaluate response  - Consider cultural and social influences on pain and pain management  - Notify physician/advanced practitioner if interventions unsuccessful or patient reports new pain  Outcome: Progressing     Problem: INFECTION - ADULT  Goal: Absence or prevention of progression during hospitalization  Description: INTERVENTIONS:  - Assess and monitor for signs and symptoms of infection  - Monitor lab/diagnostic results  - Monitor all insertion sites, i e  indwelling lines, tubes, and drains  - Monitor endotracheal if appropriate and nasal secretions for changes in amount and color  - Berger appropriate cooling/warming therapies per order  - Administer medications as ordered  - Instruct and encourage patient and family to use good hand hygiene technique  - Identify and instruct in appropriate isolation precautions for identified infection/condition  Outcome: Progressing  Goal: Absence of fever/infection during neutropenic period  Description: INTERVENTIONS:  - Monitor WBC    Outcome: Progressing     Problem: SAFETY ADULT  Goal: Maintain or return to baseline ADL function  Description: INTERVENTIONS:  -  Assess patient's ability to carry out ADLs; assess patient's baseline for ADL function and identify physical deficits which impact ability to perform ADLs (bathing, care of mouth/teeth, toileting, grooming, dressing, etc )  - Assess/evaluate cause of self-care deficits   - Assess range of motion  - Assess patient's mobility; develop plan if impaired  - Assess patient's need for assistive devices and provide as appropriate  - Encourage maximum independence but intervene and supervise when necessary  - Involve family in performance of ADLs  - Assess for home care needs following discharge   - Consider OT consult to assist with ADL evaluation and planning for discharge  - Provide patient education as appropriate  Outcome: Progressing  Goal: Maintains/Returns to pre admission functional level  Description: INTERVENTIONS:  - Perform BMAT or MOVE assessment daily    - Set and communicate daily mobility goal to care team and patient/family/caregiver  - Collaborate with rehabilitation services on mobility goals if consulted  - Reposition patient every two hours    - Record patient progress and toleration of activity level   Outcome: Progressing     Problem: DISCHARGE PLANNING  Goal: Discharge to home or other facility with appropriate resources  Description: INTERVENTIONS:  - Identify barriers to discharge w/patient and caregiver  - Arrange for needed discharge resources and transportation as appropriate  - Identify discharge learning needs (meds, wound care, etc )  - Refer to Case Management Department for coordinating discharge planning if the patient needs post-hospital services based on physician/advanced practitioner order or complex needs related to functional status, cognitive ability, or social support system  Outcome: Progressing     Problem: Knowledge Deficit  Goal: Patient/family/caregiver demonstrates understanding of disease process, treatment plan, medications, and discharge instructions  Description: Complete learning assessment and assess knowledge base    Interventions:  - Provide teaching at level of understanding  - Provide teaching via preferred learning methods  Outcome: Progressing     Problem: SKIN/TISSUE INTEGRITY - ADULT  Goal: Skin Integrity remains intact(Skin Breakdown Prevention)  Description: Assess:  -Perform Gucci assessment every shift  -Clean and moisturize skin   -Inspect skin when repositioning, toileting, and assisting with ADLS  -Assess under medical devices   -Assess extremities for adequate circulation and sensation     Bed Management:  -Have minimal linens on bed & keep smooth, unwrinkled  -Change linens as needed when moist or perspiring  -Avoid sitting or lying in one position while in bed    Toileting:  -Offer bedside commode  -Assess for incontinence   -Use incontinent care products after each incontinent episode    Activity:  -Encourage or provide ROM exercises   -Turn and reposition patient every two Hours  -Use appropriate equipment to lift or move patient in bed  -Instruct/ Assist with weight shifting when out of bed in chair  -Consider limitation of chair time     Skin Care:  -Avoid use of baby powder, tape, friction and shearing, hot water or constrictive clothing  -Relieve pressure over bony prominences  -Do not massage red bony areas    Next Steps:  -Teach patient strategies to minimize risks    -Consider consults to  interdisciplinary teams   Outcome: Progressing  Goal: Incision(s), wounds(s) or drain site(s) healing without S/S of infection  Description: INTERVENTIONS  - Assess and document dressing, incision, wound bed, drain sites and surrounding tissue  - Provide patient and family education  - Perform skin care/dressing changes per order  Outcome: Progressing  Goal: Pressure injury heals and does not worsen  Description: Interventions:  - Implement low air loss mattress or specialty surface (Criteria met)  - Apply silicone foam dressing  - Instruct/assist with weight shifting when in chair   - Limit chair time   - Use special pressure reducing interventions  when in chair   - Apply fecal or urinary incontinence containment device   - Perform passive or active ROM   - Turn and reposition patient & offload bony prominences every two hours   - Utilize friction reducing device or surface for transfers   - Consider consults to  interdisciplinary teams   - Use incontinent care products after each incontinent episode  - Consider nutrition services referral as needed  Outcome: Progressing     Problem: MOBILITY - ADULT  Goal: Maintain or return to baseline ADL function  Description: INTERVENTIONS:  -  Assess patient's ability to carry out ADLs; assess patient's baseline for ADL function and identify physical deficits which impact ability to perform ADLs (bathing, care of mouth/teeth, toileting, grooming, dressing, etc )  - Assess/evaluate cause of self-care deficits   - Assess range of motion  - Assess patient's mobility; develop plan if impaired  - Assess patient's need for assistive devices and provide as appropriate  - Encourage maximum independence but intervene and supervise when necessary  - Involve family in performance of ADLs  - Assess for home care needs following discharge   - Consider OT consult to assist with ADL evaluation and planning for discharge  - Provide patient education as appropriate  Outcome: Progressing  Goal: Maintains/Returns to pre admission functional level  Description: INTERVENTIONS:  - Perform BMAT or MOVE assessment daily    - Set and communicate daily mobility goal to care team and patient/family/caregiver     - Collaborate with rehabilitation services on mobility goals if consulted  - Reposition patient every two hours    - Record patient progress and toleration of activity level   Outcome: Progressing     Problem: Prexisting or High Potential for Compromised Skin Integrity  Goal: Skin integrity is maintained or improved  Description: INTERVENTIONS:  - Identify patients at risk for skin breakdown  - Assess and monitor skin integrity  - Assess and monitor nutrition and hydration status  - Monitor labs   - Assess for incontinence   - Turn and reposition patient  - Assist with mobility/ambulation  - Relieve pressure over bony prominences  - Avoid friction and shearing  - Provide appropriate hygiene as needed including keeping skin clean and dry  - Evaluate need for skin moisturizer/barrier cream  - Collaborate with interdisciplinary team   - Patient/family teaching  - Consider wound care consult   Outcome: Progressing

## 2022-06-05 NOTE — ASSESSMENT & PLAN NOTE
· Intrinsically controlled  · INR had been supratherapeutic now within limits  Warfarin has been restarted      Results from last 7 days   Lab Units 06/05/22  0427 06/04/22  0441 06/03/22  0407 06/02/22  0505 06/01/22  0434 05/31/22  0446 05/30/22  0427   INR  2 74* 2 52* 2 21* 2 14* 2 27* 2 37* 3 22*

## 2022-06-05 NOTE — ASSESSMENT & PLAN NOTE
Wt Readings from Last 3 Encounters:   06/05/22 (!) 200 kg (441 lb 12 8 oz)   05/08/22 (!) 212 kg (467 lb)   05/06/22 (!) 193 kg (425 lb)     · Continue IV diuresis for lower extremity swelling and panniculitis  · Continue low Na/fluid restriction, daily weights/I&Os

## 2022-06-05 NOTE — PLAN OF CARE
Problem: Potential for Falls  Goal: Patient will remain free of falls  Description: INTERVENTIONS:  - Educate patient/family on patient safety including physical limitations  - Instruct patient to call for assistance with activity   - Consult OT/PT to assist with strengthening/mobility   - Keep Call bell within reach  - Keep bed low and locked with side rails adjusted as appropriate  - Keep care items and personal belongings within reach  - Initiate and maintain comfort rounds  - Make Fall Risk Sign visible to staff  - Offer Toileting in advance of need  - Initiate/Maintain bed alarm  - Obtain necessary fall risk management equipment:   - Apply yellow socks and bracelet for high fall risk patients  - Consider moving patient to room near nurses station  Outcome: Progressing     Problem: PAIN - ADULT  Goal: Verbalizes/displays adequate comfort level or baseline comfort level  Description: Interventions:  - Encourage patient to monitor pain and request assistance  - Assess pain using appropriate pain scale  - Administer analgesics based on type and severity of pain and evaluate response  - Implement non-pharmacological measures as appropriate and evaluate response  - Consider cultural and social influences on pain and pain management  - Notify physician/advanced practitioner if interventions unsuccessful or patient reports new pain  Outcome: Progressing     Problem: INFECTION - ADULT  Goal: Absence or prevention of progression during hospitalization  Description: INTERVENTIONS:  - Assess and monitor for signs and symptoms of infection  - Monitor lab/diagnostic results  - Monitor all insertion sites, i e  indwelling lines, tubes, and drains  - Monitor endotracheal if appropriate and nasal secretions for changes in amount and color  - [place] appropriate cooling/warming therapies per order  - Administer medications as ordered  - Instruct and encourage patient and family to use good hand hygiene technique  - Identify and instruct in appropriate isolation precautions for identified infection/condition  Outcome: Progressing  Goal: Absence of fever/infection during neutropenic period  Description: INTERVENTIONS:  - Monitor WBC    Outcome: Progressing     Problem: SAFETY ADULT  Goal: Maintain or return to baseline ADL function  Description: INTERVENTIONS:  -  Assess patient's ability to carry out ADLs; assess patient's baseline for ADL function and identify physical deficits which impact ability to perform ADLs (bathing, care of mouth/teeth, toileting, grooming, dressing, etc )  - Assess/evaluate cause of self-care deficits   - Assess range of motion  - Assess patient's mobility; develop plan if impaired  - Assess patient's need for assistive devices and provide as appropriate  - Encourage maximum independence but intervene and supervise when necessary  - Involve family in performance of ADLs  - Assess for home care needs following discharge   - Consider OT consult to assist with ADL evaluation and planning for discharge  - Provide patient education as appropriate  Outcome: Progressing  Goal: Maintains/Returns to pre admission functional level  Description: INTERVENTIONS:  - Perform BMAT or MOVE assessment daily    - Set and communicate daily mobility goal to care team and patient/family/caregiver  - Collaborate with rehabilitation services on mobility goals if consulted  - Reposition patient every two hours    - Record patient progress and toleration of activity level   Outcome: Progressing     Problem: DISCHARGE PLANNING  Goal: Discharge to home or other facility with appropriate resources  Description: INTERVENTIONS:  - Identify barriers to discharge w/patient and caregiver  - Arrange for needed discharge resources and transportation as appropriate  - Identify discharge learning needs (meds, wound care, etc )  - Refer to Case Management Department for coordinating discharge planning if the patient needs post-hospital services based on physician/advanced practitioner order or complex needs related to functional status, cognitive ability, or social support system  Outcome: Progressing     Problem: Knowledge Deficit  Goal: Patient/family/caregiver demonstrates understanding of disease process, treatment plan, medications, and discharge instructions  Description: Complete learning assessment and assess knowledge base    Interventions:  - Provide teaching at level of understanding  - Provide teaching via preferred learning methods  Outcome: Progressing     Problem: SKIN/TISSUE INTEGRITY - ADULT  Goal: Skin Integrity remains intact(Skin Breakdown Prevention)  Description: Assess:  -Perform [place] assessment every shift  -Clean and moisturize skin   -Inspect skin when repositioning, toileting, and assisting with ADLS  -Assess under medical devices   -Assess extremities for adequate circulation and sensation     Bed Management:  -Have minimal linens on bed & keep smooth, unwrinkled  -Change linens as needed when moist or perspiring  -Avoid sitting or lying in one position while in bed    Toileting:  -Offer bedside commode  -Assess for incontinence   -Use incontinent care products after each incontinent episode    Activity:  -Encourage or provide ROM exercises   -Turn and reposition patient every two Hours  -Use appropriate equipment to lift or move patient in bed  -Instruct/ Assist with weight shifting when out of bed in chair  -Consider limitation of chair time     Skin Care:  -Avoid use of baby powder, tape, friction and shearing, hot water or constrictive clothing  -Relieve pressure over bony prominences  -Do not massage red bony areas    Next Steps:  -Teach patient strategies to minimize risks    -Consider consults to  interdisciplinary teams   Outcome: Progressing  Goal: Incision(s), wounds(s) or drain site(s) healing without S/S of infection  Description: INTERVENTIONS  - Assess and document dressing, incision, wound bed, drain sites and surrounding tissue  - Provide patient and family education  - Perform skin care/dressing changes per order  Outcome: Progressing  Goal: Pressure injury heals and does not worsen  Description: Interventions:  - Implement low air loss mattress or specialty surface (Criteria met)  - Apply silicone foam dressing  - Instruct/assist with weight shifting when in chair   - Limit chair time   - Use special pressure reducing interventions  when in chair   - Apply fecal or urinary incontinence containment device   - Perform passive or active ROM   - Turn and reposition patient & offload bony prominences every two hours   - Utilize friction reducing device or surface for transfers   - Consider consults to  interdisciplinary teams   - Use incontinent care products after each incontinent episode  - Consider nutrition services referral as needed  Outcome: Progressing     Problem: MOBILITY - ADULT  Goal: Maintain or return to baseline ADL function  Description: INTERVENTIONS:  -  Assess patient's ability to carry out ADLs; assess patient's baseline for ADL function and identify physical deficits which impact ability to perform ADLs (bathing, care of mouth/teeth, toileting, grooming, dressing, etc )  - Assess/evaluate cause of self-care deficits   - Assess range of motion  - Assess patient's mobility; develop plan if impaired  - Assess patient's need for assistive devices and provide as appropriate  - Encourage maximum independence but intervene and supervise when necessary  - Involve family in performance of ADLs  - Assess for home care needs following discharge   - Consider OT consult to assist with ADL evaluation and planning for discharge  - Provide patient education as appropriate  Outcome: Progressing  Goal: Maintains/Returns to pre admission functional level  Description: INTERVENTIONS:  - Perform BMAT or MOVE assessment daily    - Set and communicate daily mobility goal to care team and patient/family/caregiver     - Collaborate with rehabilitation services on mobility goals if consulted  - Reposition patient every two hours    - Record patient progress and toleration of activity level   Outcome: Progressing     Problem: Prexisting or High Potential for Compromised Skin Integrity  Goal: Skin integrity is maintained or improved  Description: INTERVENTIONS:  - Identify patients at risk for skin breakdown  - Assess and monitor skin integrity  - Assess and monitor nutrition and hydration status  - Monitor labs   - Assess for incontinence   - Turn and reposition patient  - Assist with mobility/ambulation  - Relieve pressure over bony prominences  - Avoid friction and shearing  - Provide appropriate hygiene as needed including keeping skin clean and dry  - Evaluate need for skin moisturizer/barrier cream  - Collaborate with interdisciplinary team   - Patient/family teaching  - Consider wound care consult   Outcome: Progressing

## 2022-06-05 NOTE — ASSESSMENT & PLAN NOTE
· Positive blood culture may be contaminant as is coag negative staph  · Does have history of MRSE bacteremia  · Appreciate infectious disease evaluation  · Low suspicion for true bacteremia and endovascular infection  · Completed vancomycin for panniculitis  · Repeat blood cultures no growth to date    Finish 10 day course of tx through 6/5

## 2022-06-05 NOTE — PROGRESS NOTES
Progress Note - General Surgery   Deedee Abraham 80 y o  female MRN: [id number]  Unit/Bed#: -01 Encounter: [id number]    Assessment:  Panniculitis, slowly improving  Still with significant edema left hip area but no fluctuation or obvious abscess on exam   Minimal erythema underneath bilateral abdominal pannus    Wound culture growing 3+ Pseudomonas aeruginosa and 3+ Proteus mirabilis  WBC improved 9 01 (10 2, 9 7)  Patient afebrile temp 97 9°, vital signs stable  Blood cultures no growth at 48 hours  INR 2 74  Patient is on warfarin  Super morbid obesity, BMI 80 81  No obvious abscess is noted  Recent ultrasound showing no obvious fluid collection but rather just significant edema      Plan:  Continue ID recommendations for antibiotic therapy, IV vancomycin and cefepime  Await ID input for duration and length of antibiotic therapy now the wound cultures are posted  No obvious drainable abscess on exam or review of ultrasound findings  Serial abdominal exams  At this time no concern for necrotizing soft tissue infection  Medical management per the attending hospitalist   Aqua K pad to left hip area if available, if not apply warm moist compresses hourly to area  Dr Nano Nunn to see the patient later today  Monitor a m  labs including CBC, INR  Continue warfarin, no plan for any surgical drainage needed  Management of medical comorbidities per the attending hospitalist    Subjective/Objective      Chief Complaint:  No complaints  Denies any fever  Minimal erythema bilateral pannus with drainage  Subjective:  78-year-old morbidly obese female admitted with panniculitis  No overnight events  Continues with some redness bilateral hip and abdominal pannus with edema still present  She underwent a soft tissue ultrasound which showed no obvious fluid collection though rather significant edema noted    Patient being followed by infectious disease consultant in the patient currently being treated with IV vancomycin and cefepime  Wound cultures growing Pseudomonas and Proteus  Scheduled Meds:  Current Facility-Administered Medications   Medication Dose Route Frequency Provider Last Rate    acetaminophen  650 mg Oral Q4H PRN Felipe Agrawal PA-C      allopurinol  100 mg Oral Daily [place], [place]      cefepime  2,000 mg Intravenous Q12H Pérez Marquez DO 2,000 mg (06/05/22 0050)    DULoxetine  20 mg Oral Daily [place], [place]      furosemide  40 mg Intravenous BID (diuretic) Felipe Agrawal PA-C      levothyroxine  125 mcg Oral Daily [place], [place]      ondansetron  4 mg Intravenous Q6H PRN Felipe Agrawal PA-C      saccharomyces boulardii  250 mg Oral BID [facility] [place]      triamcinolone   Topical BID [place], [place]      vancomycin  125 mg Oral HOSP [facility] [place], [place]      warfarin  5 mg Oral Every Other Day Kayla Duran DO      And    warfarin  2 mg Oral Every Other Day Pérez Marquez DO       Continuous Infusions:   PRN Meds:   acetaminophen    ondansetron    Objective:     Blood pressure 95/51, pulse 65, temperature 97 9 °F (36 6 °C), resp  rate 18, height 5' 2" (1 575 m), weight (!) 200 kg (441 lb 12 8 oz), SpO2 96 %  ,Body mass index is 80 81 kg/m²  Intake/Output Summary (Last 24 hours) at 6/5/2022 1005  Last data filed at 6/4/2022 1900  Gross per 24 hour   Intake 480 ml   Output --   Net 480 ml       Invasive Devices  Report    Peripheral Intravenous Line  Duration           Peripheral IV 05/28/22 Proximal;Right;Ventral (anterior) Forearm 7 days                Physical Exam:     Profoundly obese  Speech is clear  She is talkative and no acute distress present  ENT oral mucosa pink and moist   Heart irregular rate and rhythm  Distant heart sounds  No definite murmur heard  Lungs essentially clear  Abdomen extremely wide abdominal girth    Large abdominal pannus with erythema along the fold both hip areas  Attention to left hip area, erythema is blanchable and will dinorah with pen for perimeter of cellulitis  Area is minimally tender, no increased warmth  No drainage is expressed  There is no fluctuance or suggesting of any palpable subcutaneous fluid collection  No evidence or concern for any developing necrotizing changes  Neurological exam finds the patient oriented  Mental status seems appropriate  Mobility is limited due to her incredible obese body habitus  Lab, Imaging and other studies:  I have personally reviewed pertinent lab results  Contains abnormal data Wound culture and Gram stain  Order: [id number]   Status: Final result     Visible [facility] patient: No [facility]     Next appt: None    Specimen Information: Hip(Ischial);  Wound         0 Result Notes    Wound Culture 3+ Growth of Pseudomonas aeruginosa Abnormal        3+ Growth of Pseudomonas aeruginosa MDR Abnormal        3+ Growth of Proteus mirabilis Abnormal        2+ Growth of     Mixed Skin [place]      6/4 IMIPENEM/RELEBACTAM (RECARBRIO) ON ISO2 = 1 5 ug/ml             GRAM STAIN RESULT   Abnormal   1+ Gram negative rods      No polys seen                Susceptibility     Pseudomonas aeruginosa Pseudomonas aeruginosa MDR Proteus mirabilis     PETER PETER PETER     Ampicillin ($$)     <=8 00 ug/ml Susceptible     Aztreonam ($$$)  <=4 ug/ml Susceptible 8 ug/ml Susceptible <=4 ug/ml Susceptible     Cefazolin ($)     <=2 00 ug/ml Susceptible     Cefepime ($) <=2 00 ug/ml Susceptible 8 00 ug/ml Susceptible       Ceftazidime ($$) <=1 ug/ml Susceptible 16 ug/ml Intermediate       Ceftazidime/Avibactam   <=4 00 ug/ml Susceptible       Ceftolozane/Tazobactam   <=2 00 ug/ml Susceptible       Ciprofloxacin ($)  <=0 25 ug/ml Susceptible <=0 25 ug/ml Susceptible 1 00 ug/ml Resistant     Gentamicin ($$) <=2 ug/ml Susceptible <=2 ug/ml Susceptible <=2 ug/ml Susceptible Imipenem 2 ug/ml Susceptible 4 ug/ml Intermediate       Levofloxacin ($) <=0 50 ug/ml Susceptible <=0 50 ug/ml Susceptible <=0 50 ug/ml Susceptible     Meropenem ($$) <=1 00 ug/ml Susceptible 2 00 ug/ml Susceptible       Piperacillin + Tazobactam ($$$) <=8 ug/ml Susceptible 64 ug/ml Intermediate       Tetracycline     >8 ug/ml Resistant     Tobramycin ($) <=2 ug/ml Susceptible <=2 ug/ml Susceptible <=2 ug/ml Susceptible     Trimethoprim + Sulfamethoxazole ($$$)     <=0 5/9 5 u   Susceptible     ZID Performed Yes   Yes   Yes                        Specimen Collected: 05/31/22 13:24 Last Resulted: 06/04/22 11:52              CBC:   Lab Results   Component Value Date    WBC 9 01 06/05/2022    HGB 10 5 (L) 06/05/2022    HCT 35 1 06/05/2022    MCV 83 06/05/2022     06/05/2022    MCH 24 7 (L) 06/05/2022    MCHC 29 9 (L) 06/05/2022    RDW 16 5 (H) 06/05/2022    MPV 8 7 (L) 06/05/2022   , CMP:   Lab Results   Component Value Date    SODIUM 133 (L) 06/05/2022    K 3 4 (L) 06/05/2022    CL 95 (L) 06/05/2022    CO2 31 06/05/2022    BUN 34 (H) 06/05/2022    CREATININE 1 29 06/05/2022    CALCIUM 9 0 06/05/2022    AST 19 06/05/2022    ALT 14 06/05/2022    ALKPHOS 91 06/05/2022    EGFR 42 06/05/2022   , Coagulation:   Lab Results   Component Value Date    INR 2 74 (H) 06/05/2022     VTE Pharmacologic Prophylaxis: Heparin  VTE Mechanical Prophylaxis: sequential compression device     Velasquez Herrera PA-C

## 2022-06-05 NOTE — ASSESSMENT & PLAN NOTE
73-year-old female with morbid obesity and diastolic CHF recently hospitalized at [facility] who re-presented for recurrent cellulitis of pannus in setting of morbid obesity, chronic lymphedema, increased moisture and superficial skin breakdown  · Appreciate infectious disease input  · Wound culture with pseudomonas and proteus  · Given positive blood cultures currently on IV vancomycin -> finished 10 day course on 6/5  · Added cefepime per recommendations given growth on wound culture  Per ID, increase dose to 2 q8h until 6/9  · "if isolate is not fluoroquinolone susceptible will need midline placement, otherwise can transition to PO levaquin 750 daily on d/c"  · Surgery following, no abscess noted  · Continue aggressive skin care with minimizing friction and moisture related skin breakdown   Frequent offloading and repositioning

## 2022-06-06 LAB
ANION GAP SERPL CALCULATED.3IONS-SCNC: 9 MMOL/L (ref 4–13)
BUN SERPL-MCNC: 39 MG/DL (ref 5–25)
CALCIUM SERPL-MCNC: 9 MG/DL (ref 8.4–10.2)
CHLORIDE SERPL-SCNC: 95 MMOL/L (ref 96–108)
CO2 SERPL-SCNC: 30 MMOL/L (ref 21–32)
CREAT SERPL-MCNC: 1.35 MG/DL (ref 0.6–1.3)
ERYTHROCYTE [DISTWIDTH] IN BLOOD BY AUTOMATED COUNT: 16.6 % (ref 11.6–15.1)
GFR SERPL CREATININE-BSD FRML MDRD: 39 ML/MIN/1.73SQ M
GLUCOSE SERPL-MCNC: 118 MG/DL (ref 65–140)
HCT VFR BLD AUTO: 34.8 % (ref 34.8–46.1)
HGB BLD-MCNC: 10.4 G/DL (ref 11.5–15.4)
INR PPP: 3.06 (ref 0.84–1.19)
MCH RBC QN AUTO: 24.6 PG (ref 26.8–34.3)
MCHC RBC AUTO-ENTMCNC: 29.9 G/DL (ref 31.4–37.4)
MCV RBC AUTO: 82 FL (ref 82–98)
PLATELET # BLD AUTO: 375 THOUSANDS/UL (ref 149–390)
PMV BLD AUTO: 8.6 FL (ref 8.9–12.7)
POTASSIUM SERPL-SCNC: 3.4 MMOL/L (ref 3.5–5.3)
PROTHROMBIN TIME: 30.8 SECONDS (ref 11.6–14.5)
RBC # BLD AUTO: 4.23 MILLION/UL (ref 3.81–5.12)
SODIUM SERPL-SCNC: 134 MMOL/L (ref 135–147)
WBC # BLD AUTO: 9.67 THOUSAND/UL (ref 4.31–10.16)

## 2022-06-06 PROCEDURE — 85610 PROTHROMBIN TIME: CPT | Performed by: INTERNAL MEDICINE

## 2022-06-06 PROCEDURE — 99232 SBSQ HOSP IP/OBS MODERATE 35: CPT | Performed by: STUDENT IN AN ORGANIZED HEALTH CARE EDUCATION/TRAINING PROGRAM

## 2022-06-06 PROCEDURE — 85027 COMPLETE CBC AUTOMATED: CPT | Performed by: INTERNAL MEDICINE

## 2022-06-06 PROCEDURE — 80048 BASIC METABOLIC PNL TOTAL CA: CPT | Performed by: INTERNAL MEDICINE

## 2022-06-06 PROCEDURE — 99232 SBSQ HOSP IP/OBS MODERATE 35: CPT

## 2022-06-06 PROCEDURE — G0408 INPT/TELE FOLLOW UP 35: HCPCS | Performed by: INTERNAL MEDICINE

## 2022-06-06 RX ORDER — WARFARIN SODIUM 2 MG/1
2 TABLET ORAL
Status: COMPLETED | OUTPATIENT
Start: 2022-06-06 | End: 2022-06-06

## 2022-06-06 RX ORDER — WARFARIN SODIUM 2 MG/1
2 TABLET ORAL EVERY OTHER DAY
Status: DISCONTINUED | OUTPATIENT
Start: 2022-06-07 | End: 2022-06-11

## 2022-06-06 RX ORDER — WARFARIN SODIUM 5 MG/1
5 TABLET ORAL EVERY OTHER DAY
Status: DISCONTINUED | OUTPATIENT
Start: 2022-06-08 | End: 2022-06-11

## 2022-06-06 RX ORDER — FUROSEMIDE 40 MG/1
40 TABLET ORAL DAILY
Status: DISCONTINUED | OUTPATIENT
Start: 2022-06-07 | End: 2022-06-07

## 2022-06-06 RX ADMIN — Medication 250 MG: at 09:31

## 2022-06-06 RX ADMIN — Medication 125 MG: at 00:37

## 2022-06-06 RX ADMIN — ALLOPURINOL 100 MG: 100 TABLET ORAL at 09:31

## 2022-06-06 RX ADMIN — WARFARIN SODIUM 2 MG: 2 TABLET ORAL at 18:05

## 2022-06-06 RX ADMIN — FUROSEMIDE 40 MG: 10 INJECTION, SOLUTION INTRAMUSCULAR; INTRAVENOUS at 09:32

## 2022-06-06 RX ADMIN — Medication 125 MG: at 05:25

## 2022-06-06 RX ADMIN — Medication 125 MG: at 12:44

## 2022-06-06 RX ADMIN — TRIAMCINOLONE ACETONIDE: 1 CREAM TOPICAL at 17:24

## 2022-06-06 RX ADMIN — TRIAMCINOLONE ACETONIDE 1 APPLICATION: 1 CREAM TOPICAL at 09:33

## 2022-06-06 RX ADMIN — ACETAMINOPHEN 650 MG: 325 TABLET ORAL at 05:24

## 2022-06-06 RX ADMIN — Medication 125 MG: at 17:23

## 2022-06-06 RX ADMIN — Medication 250 MG: at 17:23

## 2022-06-06 RX ADMIN — DULOXETINE 20 MG: 20 CAPSULE, DELAYED RELEASE ORAL at 09:31

## 2022-06-06 RX ADMIN — LEVOTHYROXINE SODIUM 125 MCG: 25 TABLET ORAL at 09:32

## 2022-06-06 RX ADMIN — CEFEPIME HYDROCHLORIDE 2000 MG: 2 INJECTION, SOLUTION INTRAVENOUS at 13:56

## 2022-06-06 RX ADMIN — CEFEPIME HYDROCHLORIDE 2000 MG: 2 INJECTION, SOLUTION INTRAVENOUS at 00:54

## 2022-06-06 NOTE — PROGRESS NOTES
[facility]  Progress Note - Jorge Ser 1950, 80 y o  female MRN: [id number]  Unit/Bed#: -01 Encounter: [id number]  Primary Care Provider: ORACIO Sanches   Date and time admitted to hospital: 5/27/2022  7:52 PM    * Panniculitis  Assessment & Plan  62-year-old female with morbid obesity and diastolic CHF recently hospitalized at [facility] who re-presented for recurrent cellulitis of pannus in setting of morbid obesity, chronic lymphedema, increased moisture and superficial skin breakdown  · Wound culture with pseudomonas and proteus  · Continue cefepime 2gm q8 until 6/9, then levaquin 750mg thereafter  · Surgery evaluated, no surgical intervention or abscess noted  · Continue aggressive skin care with minimizing friction and moisture related skin breakdown  Frequent offloading and repositioning       BMI 70 and over, adult [facility])  Assessment & Plan  · Continue supportive care  · Risk factor for recurrent infections    Positive blood culture  Assessment & Plan  · Positive blood culture may be contaminant as is coag negative staph  · Does have history of MRSE bacteremia  · Appreciate infectious disease evaluation    · Low suspicion for true bacteremia and endovascular infection  · Completed vancomycin until 06/05    Chronic atrial fibrillation (HCC)  Assessment & Plan  · Not on BB  · INR therapeutic, continue coumadin    History of Clostridioides difficile infection  Assessment & Plan  · Continue prophylactic oral vancomycin x72 hours beyond systemic antibiotics  · Continue probiotics    Stage 3a chronic kidney disease (HCC)  Assessment & Plan  · Creatinine currently at baseline  · Will continue monitor as patient was receiving IV diuresis    Chronic diastolic congestive heart failure [facility])  Assessment & Plan  Wt Readings from Last 3 Encounters:   06/06/22 (!) 201 kg (442 lb 10 9 oz)   05/08/22 (!) 212 kg (467 lb)   05/06/22 (!) 193 kg (425 lb)     · Diuresed for lower extremity swelling with IV Lasix  · Creatinine slightly elevated, will discontinue IV diuresis  · Monitor BMP, likely resume p o  Lasix tomorrow  · Continue fluid restriction, low-sodium diet    Other specified hypothyroidism  Assessment & Plan  · Continue levothyroxine        VTE Pharmacologic Prophylaxis:   Pharmacologic: Warfarin (Coumadin)  Mechanical VTE Prophylaxis in Place: Yes    Patient Centered Rounds: I have performed bedside rounds with nursing staff today  Discussions with Specialists or Other Care Team Provider: ID, Surgery    Education and Discussions with Family / Patient: patient    Time Spent for Care: 30 minutes  More than 50% of total time spent on counseling and coordination of care as described above  Current Length of Stay: 10 day(s)    Current Patient Status: Inpatient   Certification Statement: The patient will continue to require additional inpatient hospital stay due to IV abx, diuresis, monitor renal functions    Discharge Plan: pending    Code Status: Level 1 - Full Code      Subjective:   No events overnight  Denies pain, fevers or chills  Had been diuresing well  No medical complaints  Objective:     Vitals:   Temp (24hrs), Av 6 °F (36 4 °C), Min:97 °F (36 1 °C), Max:98 2 °F (36 8 °C)    Temp:  [97 °F (36 1 °C)-98 2 °F (36 8 °C)] 97 6 °F (36 4 °C)  HR:  [60-77] 66  Resp:  [18-21] 18  BP: (114-115)/(68-74) 115/68  SpO2:  [93 %-96 %] 93 %  Body mass index is 80 97 kg/m²  Input and Output Summary (last 24 hours): Intake/Output Summary (Last 24 hours) at 2022 1516  Last data filed at 2022 0741  Gross per 24 hour   Intake 470 ml   Output --   Net 470 ml       Physical Exam:     Physical Exam  Vitals and nursing note reviewed  Constitutional:       Appearance: She is obese  HENT:      Head: Normocephalic and atraumatic  Eyes:      General: No scleral icterus  Pupils: Pupils are equal, round, and reactive to light     Cardiovascular: Rate and Rhythm: Normal rate and regular rhythm  Heart sounds: Normal heart sounds  Pulmonary:      Effort: Pulmonary effort is normal  No respiratory distress  Comments: Difficult to assess due to body habitus  Abdominal:      Comments: Obese, panniculitis   Musculoskeletal:         General: No swelling  Skin:     General: Skin is warm  Findings: Erythema present  Neurological:      General: No focal deficit present  Mental Status: She is alert and oriented to person, place, and time  Mental status is at baseline  Psychiatric:         Mood and Affect: Mood normal          Additional Data:     Labs:    Results from last 7 days   Lab Units 06/06/22  0510   WBC Thousand/uL 9 67   HEMOGLOBIN g/dL 10 4*   HEMATOCRIT % 34 8   PLATELETS Thousands/uL 375     Results from last 7 days   Lab Units 06/06/22  0510 06/05/22  0427   SODIUM mmol/L 134* 133*   POTASSIUM mmol/L 3 4* 3 4*   CHLORIDE mmol/L 95* 95*   CO2 mmol/L 30 31   BUN mg/dL 39* 34*   CREATININE mg/dL 1 35* 1 29   ANION GAP mmol/L 9 7   CALCIUM mg/dL 9 0 9 0   ALBUMIN g/dL  --  3 0*   TOTAL BILIRUBIN mg/dL  --  0 73   ALK PHOS U/L  --  91   ALT U/L  --  14   AST U/L  --  19   GLUCOSE RANDOM mg/dL 118 130     Results from last 7 days   Lab Units 06/06/22  0510   INR  3 06*                       * I Have Reviewed All Lab Data Listed Above  * Additional Pertinent Lab Tests Reviewed: Tracy 67 Admission Reviewed    Imaging:    US superficial lump (non extremity)    Result Date: 6/1/2022  Impression: Diffuse subcutaneous edema without organized fluid collection  Workstation performed: [id number]       Recent Cultures (last 7 days):     Results from last 7 days   Lab Units 06/03/22  0406 05/31/22  1324   BLOOD CULTURE  No Growth at 72 hrs    No Growth at 72 hrs   --    GRAM STAIN RESULT   --  1+ Gram negative rods*  No polys seen*   WOUND CULTURE   --  3+ Growth of Pseudomonas aeruginosa*  3+ Growth of Pseudomonas aeruginosa MDR*  3+ Growth of Proteus mirabilis*  2+ Growth of        Last 24 Hours Medication List:   Current Facility-Administered Medications   Medication Dose Route Frequency Provider Last Rate    acetaminophen  650 mg Oral Q4H PRN Rj Ordonez PA-C      allopurinol  100 mg Oral Daily [place], [place]      cefepime  2,000 mg Intravenous Q12H Pérez Marquez, DO 2,000 mg (06/06/22 1356)    DULoxetine  20 mg Oral Daily [place], [place]      [START ON 6/7/2022] furosemide  40 mg Oral Daily Angela Alfred MD      levothyroxine  125 mcg Oral Daily [place], [place]      ondansetron  4 mg Intravenous Q6H PRN Rj Ordonez PA-C      saccharomyces boulardii  250 mg Oral BID [facility] [place]      triamcinolone   Topical BID [place], [place]      vancomycin  125 mg Oral HOSP [place], [place]      warfarin  5 mg Oral Every Other Day Khanh Palafox DO      And    warfarin  2 mg Oral Every Other Day Khanh Palafox DO          Today, Patient Was Seen By: Angela Alfred MD    ** Please Note: Dictation voice to text software may have been used in the creation of this document   **

## 2022-06-06 NOTE — ASSESSMENT & PLAN NOTE
· Positive blood culture may be contaminant as is coag negative staph  · Does have history of MRSE bacteremia  · Appreciate infectious disease evaluation    · Low suspicion for true bacteremia and endovascular infection  · Completed vancomycin until 06/05

## 2022-06-06 NOTE — NURSING NOTE
Patient resting in bed, surgical PA-C in with patient  Denies pain, offers no complaints at this time  Call bell and belongings within reach

## 2022-06-06 NOTE — PROGRESS NOTES
Progress Note - Infectious Disease   Tran Jaramillo 80 y o  female MRN: [id number]  Unit/Bed#: -01 Encounter: [id number]        REQUIRED DOCUMENTATION:     1  This service was provided via Telemedicine  2  Provider located at [facility]  3  TeleMed provider: Susan Gardner MD   4  Identify all parties in room with patient during tele consult:RN  5  After connecting through [employer], patient was identified by name and date of birth and assistant checked wristband  Patient was then informed that this was a Telemedicine visit and that the exam was being conducted confidentially over secure lines  My office door was closed  No one else was in the room  Patient acknowledged consent and understanding of privacy and security of the Telemedicine visit, and gave us permission to have the assistant stay in the room in order to assist with the history and to conduct the exam   I informed the patient that I have reviewed their record in Epic and presented the opportunity for them to ask any questions regarding the visit today  The patient agreed to participate         Assessment/Recommendations     1  Cellulitis of left lower abdominal pannus   - In the setting of morbid obesity, chronic lymphedema, increased moisture and superficial skin breakdown  - Wound cx with Pseudomonas (1 strain with PETER of 8 to cefepime) and Proteus  - US without abscess  - afebrile without leukocytosis, clinically improved     · Discontinue vanc  · Continue cefepime adjust dose to 2 q8h, anticipate completing 10 days of therapy through 6/9, can transition to po levaquin 750 daily on discharge  · Serial exams   · Additional management per surgery  · Continue aggressive skin care, minimize friction and moisture related skin breakdown, frequent offloading, repositioning  · Discussed natural history of cellulitis and discussed with patient that she is at risk for recurrent cellulitis/bacteremia if underlying risk factors cannot be modified     2  Positive blood cx  - Bld cx previous admission both sets with MRSE, completed 7 days of iv vanc  - Now single set with MRSE, no intravascular prosthetic devices or hardware  - Rpt bld cx, TTE negative  - Do not suspect true bacteremia given isolated positive cx but harder to interpret given recent positive bld cx  Low suspicion for endovascular infection       · Completed 10 days of  therapy, discontinue Vanc     3  Recurrent C difficile colitis     - No evidence of active colitis        · Continue p o  Vancomycin prophylaxis x 72 hours beyond systemic antibiotic through      4  Morbid obesity  - Risk factor for recurrent infection and failure of oral antibiotics for cellulitis    ·  Continue supportive care, recommend outpatient evaluation for weight loss    5  Drug allergies  - Hives with penicillin    Discussed with the primary service  History       Subjective: The patient has no complaints  Denies fevers, chills, or sweats  Denies nausea, vomiting, or diarrhea      Antibiotics:  Vanc, cefepime      Physical Exam     Temp:  [97 °F (36 1 °C)-98 2 °F (36 8 °C)] 97 6 °F (36 4 °C)  HR:  [60-77] 66  Resp:  [18-21] 18  BP: (114-117)/(61-74) 115/68  SpO2:  [93 %-96 %] 93 %  Temp (24hrs), Av 6 °F (36 4 °C), Min:97 °F (36 1 °C), Max:98 2 °F (36 8 °C)  Current: Temperature: 97 6 °F (36 4 °C)    Intake/Output Summary (Last 24 hours) at 2022 1043  Last data filed at 2022 0741  Gross per 24 hour   Intake 710 ml   Output --   Net 710 ml       Physical exam findings reported by bedside and primary medical team staff      General Appearance:  Appearing chronically ill, nontoxic, and in no distress, appears stated age   Throat: Oropharynx moist without lesions; lips, mucosa, and tongue normal; teeth and gums normal   Lungs:   Diminished bilaterally, no audible wheezes, rhonchi and rales, respirations unlabored   Heart:  Regular rate and rhythm, S1, S2 normal, no murmur, rub or gallop Abdomen:   Soft, non-tender, non-distended, positive bowel sounds, no masses, no organomegaly    No CVA tenderness   Extremities: Extremities normal, atraumatic, no cyanosis, clubbing or edema   Skin: Left lower abdomen wound dressed, edema noted, no erythema   Neurologic: Alert and oriented times 3         Invasive Devices:   Peripheral IV 05/28/22 Proximal;Right;Ventral (anterior) Forearm (Active)   Site Assessment WDL 05/31/22 1200   Dressing Type Transparent 05/30/22 1028   Line Status Infusing 05/30/22 1028   Dressing Status Clean;Dry; Intact 05/30/22 1028       Labs, Imaging, & Other Studies     Lab Results:    I have personally reviewed pertinent labs  Results from last 7 days   Lab Units 06/06/22  0510 06/05/22  0427 06/04/22  0441   WBC Thousand/uL 9 67 9 01 10 23*   HEMOGLOBIN g/dL 10 4* 10 5* 10 7*   PLATELETS Thousands/uL 375 380 412*     Results from last 7 days   Lab Units 06/06/22  0510 06/05/22  0427 06/03/22  0407 06/02/22  0505 06/01/22  0434   POTASSIUM mmol/L 3 4* 3 4*   < > 3 6 3 3*   CHLORIDE mmol/L 95* 95*   < > 97 99   CO2 mmol/L 30 31   < > 30 28   BUN mg/dL 39* 34*   < > 28* 25   CREATININE mg/dL 1 35* 1 29   < > 1 21 1 08   EGFR ml/min/1 73sq m 39 42   < > 45 52   CALCIUM mg/dL 9 0 9 0   < > 8 9 8 8   AST U/L  --  19  --  23 22   ALT U/L  --  14  --  16 16   ALK PHOS U/L  --  91  --  100 101    < > = values in this interval not displayed  Results from last 7 days   Lab Units 06/03/22  0406 05/31/22  1324   BLOOD CULTURE  No Growth at 72 hrs  No Growth at 72 hrs   --    GRAM STAIN RESULT   --  1+ Gram negative rods*  No polys seen*   WOUND CULTURE   --  3+ Growth of Pseudomonas aeruginosa*  3+ Growth of Pseudomonas aeruginosa MDR*  3+ Growth of Proteus mirabilis*  2+ Growth of        Imaging Studies:   I have personally reviewed pertinent imaging study reports and images in PACS        EKG, Pathology, and Other Studies:   I have personally reviewed pertinent reports  Counseling/Coordination of care: Total 35 minutes communication with the patient via telehealth  Labs, medical tests and imaging studies were independently reviewed by me as noted above

## 2022-06-06 NOTE — ASSESSMENT & PLAN NOTE
Wt Readings from Last 3 Encounters:   06/06/22 (!) 201 kg (442 lb 10 9 oz)   05/08/22 (!) 212 kg (467 lb)   05/06/22 (!) 193 kg (425 lb)     · Diuresed for lower extremity swelling with IV Lasix  · Creatinine slightly elevated, will discontinue IV diuresis  · Monitor BMP, likely resume p o   Lasix tomorrow  · Continue fluid restriction, low-sodium diet

## 2022-06-06 NOTE — ASSESSMENT & PLAN NOTE
· Continue prophylactic oral vancomycin x72 hours beyond systemic antibiotics  · Continue probiotics

## 2022-06-06 NOTE — PROGRESS NOTES
Progress Note - General Surgery   Fili Shine 80 y o  female MRN: [id number]  Unit/Bed#: -01 Encounter: [id number]    Assessment:  61-year-old female presenting with panniculitis  AVSS on room air  WBC 9 67 from 9 01  HGB 10 4 from 10 5  CR 1 25 from 1 29  BC x2 06/03/2022 with no growth at 72 hours  Wound culture 05/31/2022 with Pseudomonas and Proteus    Plan:  No acute surgical intervention planned at this time  No evidence of underlying abscess development  Continue current management per SLIM and ID  General surgery will see on an as needed basis for evaluation of underlying abscess development  Please contact us with any questions or concerns regarding this patient    Subjective/Objective   Chief Complaint:  None    Subjective:  No acute overnight events  Patient is doing well this morning offers no acute complaints  Tolerating diet without abdominal pain, nausea, vomiting  Denies chest pain, palpitations, shortness of breath, calf tenderness  Denies fever, chills  Objective:   Blood pressure 115/68, pulse 66, temperature 97 6 °F (36 4 °C), resp  rate 18, height 5' 2" (1 575 m), weight (!) 201 kg (442 lb 10 9 oz), SpO2 93 %  ,Body mass index is 80 97 kg/m²  Intake/Output Summary (Last 24 hours) at 6/6/2022 1001  Last data filed at 6/6/2022 0741  Gross per 24 hour   Intake 710 ml   Output --   Net 710 ml       Invasive Devices  Report    Peripheral Intravenous Line  Duration           Peripheral IV 05/28/22 Proximal;Right;Ventral (anterior) Forearm 8 days              Physical Exam  Vitals and nursing note reviewed  Constitutional:       General: She is not in acute distress  Appearance: Normal appearance  She is obese  She is not ill-appearing or toxic-appearing  HENT:      Head: Normocephalic and atraumatic  Cardiovascular:      Rate and Rhythm: Normal rate  Rhythm irregular  Pulses: Normal pulses  Heart sounds: Normal heart sounds  No murmur heard      No friction rub  No gallop  Pulmonary:      Effort: Pulmonary effort is normal  No respiratory distress  Breath sounds: Normal breath sounds  No wheezing, rhonchi or rales  Abdominal:      General: Bowel sounds are normal  There is no distension  Palpations: Abdomen is soft  Tenderness: There is no abdominal tenderness  There is no guarding or rebound  Comments: Right pannus with improving erythema, no signs of drainage; left pannus/hip with persistent erythema, no signs of active drainage; nontender to palpation bilaterally; no evidence to suggest underlying abscess development, exam has been stable   Musculoskeletal:         General: Normal range of motion  Right lower leg: Edema present  Left lower leg: Edema present  Skin:     General: Skin is warm and dry  Capillary Refill: Capillary refill takes less than 2 seconds  Findings: Lesion present  Neurological:      General: No focal deficit present  Mental Status: She is alert and oriented to person, place, and time  Psychiatric:         Mood and Affect: Mood normal          Behavior: Behavior normal          Thought Content: Thought content normal        Lab, Imaging and other studies:  I have personally reviewed pertinent lab results      CBC:   Lab Results   Component Value Date    WBC 9 67 06/06/2022    HGB 10 4 (L) 06/06/2022    HCT 34 8 06/06/2022    MCV 82 06/06/2022     06/06/2022    MCH 24 6 (L) 06/06/2022    MCHC 29 9 (L) 06/06/2022    RDW 16 6 (H) 06/06/2022    MPV 8 6 (L) 06/06/2022     CMP:   Lab Results   Component Value Date    SODIUM 134 (L) 06/06/2022    K 3 4 (L) 06/06/2022    CL 95 (L) 06/06/2022    CO2 30 06/06/2022    BUN 39 (H) 06/06/2022    CREATININE 1 35 (H) 06/06/2022    CALCIUM 9 0 06/06/2022    EGFR 39 06/06/2022     VTE Pharmacologic Prophylaxis: Warfarin (Coumadin)  VTE Mechanical Prophylaxis: sequential compression device    Jae Alfred PA-C

## 2022-06-06 NOTE — ASSESSMENT & PLAN NOTE
29-year-old female with morbid obesity and diastolic CHF recently hospitalized at [facility] who re-presented for recurrent cellulitis of pannus in setting of morbid obesity, chronic lymphedema, increased moisture and superficial skin breakdown  · Wound culture with pseudomonas and proteus  · Continue cefepime 2gm q8 until 6/9, then levaquin 750mg thereafter  · Surgery evaluated, no surgical intervention or abscess noted  · Continue aggressive skin care with minimizing friction and moisture related skin breakdown   Frequent offloading and repositioning

## 2022-06-07 LAB
ANION GAP SERPL CALCULATED.3IONS-SCNC: 9 MMOL/L (ref 4–13)
BUN SERPL-MCNC: 41 MG/DL (ref 5–25)
CALCIUM SERPL-MCNC: 9.1 MG/DL (ref 8.4–10.2)
CHLORIDE SERPL-SCNC: 95 MMOL/L (ref 96–108)
CO2 SERPL-SCNC: 29 MMOL/L (ref 21–32)
CREAT SERPL-MCNC: 1.44 MG/DL (ref 0.6–1.3)
GFR SERPL CREATININE-BSD FRML MDRD: 36 ML/MIN/1.73SQ M
GLUCOSE SERPL-MCNC: 110 MG/DL (ref 65–140)
INR PPP: 2.93 (ref 0.84–1.19)
POTASSIUM SERPL-SCNC: 3.6 MMOL/L (ref 3.5–5.3)
PROTHROMBIN TIME: 29.8 SECONDS (ref 11.6–14.5)
SODIUM SERPL-SCNC: 133 MMOL/L (ref 135–147)

## 2022-06-07 PROCEDURE — 99232 SBSQ HOSP IP/OBS MODERATE 35: CPT | Performed by: STUDENT IN AN ORGANIZED HEALTH CARE EDUCATION/TRAINING PROGRAM

## 2022-06-07 PROCEDURE — 85610 PROTHROMBIN TIME: CPT | Performed by: HOSPITALIST

## 2022-06-07 PROCEDURE — 80048 BASIC METABOLIC PNL TOTAL CA: CPT | Performed by: STUDENT IN AN ORGANIZED HEALTH CARE EDUCATION/TRAINING PROGRAM

## 2022-06-07 RX ORDER — FUROSEMIDE 40 MG/1
40 TABLET ORAL DAILY
Status: DISCONTINUED | OUTPATIENT
Start: 2022-06-08 | End: 2022-06-07

## 2022-06-07 RX ADMIN — Medication 125 MG: at 23:10

## 2022-06-07 RX ADMIN — Medication 125 MG: at 00:45

## 2022-06-07 RX ADMIN — CEFEPIME HYDROCHLORIDE 2000 MG: 2 INJECTION, SOLUTION INTRAVENOUS at 14:13

## 2022-06-07 RX ADMIN — CEFEPIME HYDROCHLORIDE 2000 MG: 2 INJECTION, SOLUTION INTRAVENOUS at 00:45

## 2022-06-07 RX ADMIN — Medication 250 MG: at 08:11

## 2022-06-07 RX ADMIN — ALLOPURINOL 100 MG: 100 TABLET ORAL at 08:11

## 2022-06-07 RX ADMIN — TRIAMCINOLONE ACETONIDE: 1 CREAM TOPICAL at 08:11

## 2022-06-07 RX ADMIN — Medication 125 MG: at 12:37

## 2022-06-07 RX ADMIN — WARFARIN SODIUM 2 MG: 2 TABLET ORAL at 17:05

## 2022-06-07 RX ADMIN — Medication 125 MG: at 17:06

## 2022-06-07 RX ADMIN — DULOXETINE 20 MG: 20 CAPSULE, DELAYED RELEASE ORAL at 08:11

## 2022-06-07 RX ADMIN — LEVOTHYROXINE SODIUM 125 MCG: 25 TABLET ORAL at 08:11

## 2022-06-07 RX ADMIN — Medication 125 MG: at 04:28

## 2022-06-07 RX ADMIN — Medication 250 MG: at 17:05

## 2022-06-07 NOTE — PLAN OF CARE
Problem: Potential for Falls  Goal: Patient will remain free of falls  Description: INTERVENTIONS:  - Educate patient/family on patient safety including physical limitations  - Instruct patient to call for assistance with activity   - Consult OT/PT to assist with strengthening/mobility   - Keep Call bell within reach  - Keep bed low and locked with side rails adjusted as appropriate  - Keep care items and personal belongings within reach  - Initiate and maintain comfort rounds  - Make Fall Risk Sign visible to staff  - Offer Toileting in advance of need  - Initiate/Maintain bed alarm  - Obtain necessary fall risk management equipment:   - Apply yellow socks and bracelet for high fall risk patients  - Consider moving patient to room near nurses station  6/6/2022 2048 by Payton Marin, RN  Outcome: Bhupendra Ohms  6/6/2022 2048 by Payton Marin RN  Outcome: Progressing     Problem: PAIN - ADULT  Goal: Verbalizes/displays adequate comfort level or baseline comfort level  Description: Interventions:  - Encourage patient to monitor pain and request assistance  - Assess pain using appropriate pain scale  - Administer analgesics based on type and severity of pain and evaluate response  - Implement non-pharmacological measures as appropriate and evaluate response  - Consider cultural and social influences on pain and pain management  - Notify physician/advanced practitioner if interventions unsuccessful or patient reports new pain  6/6/2022 2048 by Payton Marin, RN  Outcome: Progressing  6/6/2022 2048 by Payton Marin, RN  Outcome: Progressing

## 2022-06-07 NOTE — PLAN OF CARE
Problem: Potential for Falls  Goal: Patient will remain free of falls  Description: INTERVENTIONS:  - Educate patient/family on patient safety including physical limitations  - Instruct patient to call for assistance with activity   - Consult OT/PT to assist with strengthening/mobility   - Keep Call bell within reach  - Keep bed low and locked with side rails adjusted as appropriate  - Keep care items and personal belongings within reach  - Initiate and maintain comfort rounds  - Make Fall Risk Sign visible to staff  - Offer Toileting in advance of need  - Initiate/Maintain bed alarm  - Obtain necessary fall risk management equipment:   - Apply yellow socks and bracelet for high fall risk patients  - Consider moving patient to room near nurses station  Outcome: Progressing     Problem: PAIN - ADULT  Goal: Verbalizes/displays adequate comfort level or baseline comfort level  Description: Interventions:  - Encourage patient to monitor pain and request assistance  - Assess pain using appropriate pain scale  - Administer analgesics based on type and severity of pain and evaluate response  - Implement non-pharmacological measures as appropriate and evaluate response  - Consider cultural and social influences on pain and pain management  - Notify physician/advanced practitioner if interventions unsuccessful or patient reports new pain  Outcome: Progressing

## 2022-06-07 NOTE — ASSESSMENT & PLAN NOTE
Wt Readings from Last 3 Encounters:   06/07/22 (!) 205 kg (451 lb 15 1 oz)   05/08/22 (!) 212 kg (467 lb)   05/06/22 (!) 193 kg (425 lb)     · Diuresed for lower extremity swelling with IV Lasix  · Creatinine slightly elevated, will discontinue IV diuresis  · Monitor BMP, likely resume p o   Lasix tomorrow  · Continue fluid restriction, low-sodium diet

## 2022-06-07 NOTE — PLAN OF CARE
Problem: INFECTION - ADULT  Goal: Absence or prevention of progression during hospitalization  Description: INTERVENTIONS:  - Assess and monitor for signs and symptoms of infection  - Monitor lab/diagnostic results  - Monitor all insertion sites, i e  indwelling lines, tubes, and drains  - Monitor endotracheal if appropriate and nasal secretions for changes in amount and color  - [place] appropriate cooling/warming therapies per order  - Administer medications as ordered  - Instruct and encourage patient and family to use good hand hygiene technique  - Identify and instruct in appropriate isolation precautions for identified infection/condition  Outcome: Progressing     Problem: SKIN/TISSUE INTEGRITY - ADULT  Goal: Pressure injury heals and does not worsen  Description: Interventions:  - Implement low air loss mattress or specialty surface (Criteria met)  - Apply silicone foam dressing  - Instruct/assist with weight shifting when in chair   - Limit chair time   - Use special pressure reducing interventions  when in chair   - Apply fecal or urinary incontinence containment device   - Perform passive or active ROM   - Turn and reposition patient & offload bony prominences every two hours   - Utilize friction reducing device or surface for transfers   - Consider consults to  interdisciplinary teams   - Use incontinent care products after each incontinent episode  - Consider nutrition services referral as needed  Outcome: Progressing     Problem: Prexisting or High Potential for Compromised Skin Integrity  Goal: Skin integrity is maintained or improved  Description: INTERVENTIONS:  - Identify patients at risk for skin breakdown  - Assess and monitor skin integrity  - Assess and monitor nutrition and hydration status  - Monitor labs   - Assess for incontinence   - Turn and reposition patient  - Assist with mobility/ambulation  - Relieve pressure over bony prominences  - Avoid friction and shearing  - Provide appropriate hygiene as needed including keeping skin clean and dry  - Evaluate need for skin moisturizer/barrier cream  - Collaborate with interdisciplinary team   - Patient/family teaching  - Consider wound care consult   Outcome: Progressing     Problem: Knowledge Deficit  Goal: Patient/family/caregiver demonstrates understanding of disease process, treatment plan, medications, and discharge instructions  Description: Complete learning assessment and assess knowledge base    Interventions:  - Provide teaching at level of understanding  - Provide teaching via preferred learning methods  Outcome: Progressing

## 2022-06-07 NOTE — ASSESSMENT & PLAN NOTE
81-year-old female with morbid obesity and diastolic CHF recently hospitalized at [facility] who re-presented for recurrent cellulitis of pannus in setting of morbid obesity, chronic lymphedema, increased moisture and superficial skin breakdown  · Wound culture with pseudomonas and proteus  · Continue cefepime 2gm q8 until 6/9, then levaquin 750mg thereafter  · Surgery evaluated, no surgical intervention or abscess noted  · Continue aggressive skin care with minimizing friction and moisture related skin breakdown   Frequent offloading and repositioning

## 2022-06-07 NOTE — WOUND OSTOMY CARE
Progress Note - Wound   [place] 80 y o  female MRN: [id number]  Unit/Bed#: -01 Encounter: [id number]    Assessment:   Wound care weekly follow up  Patient in bed on Kreg bariatric bed  She is awake, alert and oriented  She is cooperative with assessment  The patient can be repositioned in bed with assist of 2 and is dependent upon nursing staff or caretaker for all of her needs  Findings  1  Bilateral heels intact  2  Buttocks and sacrum intact  3  Abdominal fold resolving MASD/ITD  4  Left lateral hip-thigh cellulitis resolved, skin dry, scaly without erythema    Skin and Wound Care Plan:   1  Cleanse bilateral skin folds to abdomen and inguinal areas with foaming cleanser & pat dry well  Apply small amount of Kenalog cream to wound beds and place ABD pad to each skin fold  Change daily & prn soilage/dislodgement  2  Apply hydraguard to b/l heels, buttocks and sacrum BID and PRN for prevention and protection  3  Apply skin nourishing cream the entire skin daily for moisture  4  Turn and reposition patient every  2 hours   5  Elevate heels off of bed with pillows to offload pressure   6  Apply EHOB waffle cushion to chair when OOB, if able  7  The patient has a bariatric bed with low air-loss, pressure redistribution mattress in place    Wound; Wound 05/28/22 Cellulitis Hip Anterior;Left;Proximal (Active)   Wound Image   05/29/22 1255   Wound Description Beefy red;Drainage; Swelling 06/06/22 2041   Alison-wound Assessment Scaly 06/06/22 2041   Drainage Amount None 06/06/22 1223   Drainage Description Serous; Yellow 06/05/22 2000   Treatments Cleansed;Site care 06/06/22 1223   Dressing Open to air 06/06/22 2041   Dressing Changed Changed 06/02/22 1927   Patient Tolerance Tolerated well 06/06/22 1223   Dressing Status Clean;Dry; Intact 06/05/22 1007       Wound 05/28/22 Cellulitis Abdomen Right; Lower (Active)   Wound Image    05/29/22 1248   Wound Description Pink 06/06/22 2041   Alison-wound Assessment Fragile;Pink 06/06/22 2041   Wound Length (cm) 0 4 cm 06/06/22 1223   Wound Width (cm) 6 cm 06/06/22 1223   Wound Depth (cm) 0 1 cm 06/06/22 1223   Wound Surface Area (cm^2) 2 4 cm^2 06/06/22 1223   Wound Volume (cm^3) 0 24 cm^3 06/06/22 1223   Calculated Wound Volume (cm^3) 0 24 cm^3 06/06/22 1223   Change in Wound Size % 87 5 06/06/22 1223   Drainage Amount None 06/06/22 1223   Drainage Description Bloody 05/29/22 1248   Non-staged Wound Description Partial thickness 06/06/22 1223   Treatments Cleansed 06/02/22 1927   Dressing Open to air 06/06/22 2041   Dressing Changed Changed 06/06/22 2041   Patient Tolerance Tolerated well 06/06/22 1223   Dressing Status Clean;Dry; Intact 06/05/22 1007     Reviewed plan of care with primary RN Nella  Recommendations written as orders  Wound care team to follow weekly while admitted  Questions or concerns [address] [facility] Nurse    Arlette ELIZONDON, RN, [facility]

## 2022-06-07 NOTE — PROGRESS NOTES
[address] 46  Progress Note - Beatris Fitting 1950, 80 y o  female MRN: [id number]  Unit/Bed#: -01 Encounter: [id number]  Primary Care Provider: ORACIO Urbina   Date and time admitted to hospital: 5/27/2022  7:52 PM    * Panniculitis  Assessment & Plan  73-year-old female with morbid obesity and diastolic CHF recently hospitalized at [facility] who re-presented for recurrent cellulitis of pannus in setting of morbid obesity, chronic lymphedema, increased moisture and superficial skin breakdown  · Wound culture with pseudomonas and proteus  · Continue cefepime 2gm q8 until 6/9, then levaquin 750mg thereafter  · Surgery evaluated, no surgical intervention or abscess noted  · Continue aggressive skin care with minimizing friction and moisture related skin breakdown  Frequent offloading and repositioning       BMI 70 and over, adult [facility])  Assessment & Plan  · Continue supportive care  · Risk factor for recurrent infections    Positive blood culture  Assessment & Plan  · Positive blood culture may be contaminant as is coag negative staph  · Does have history of MRSE bacteremia  · Appreciate infectious disease evaluation    · Low suspicion for true bacteremia and endovascular infection  · Completed vancomycin until 06/05    Chronic atrial fibrillation (HCC)  Assessment & Plan  · Not on BB  · INR therapeutic, continue coumadin    History of Clostridioides difficile infection  Assessment & Plan  · Continue prophylactic oral vancomycin x72 hours beyond systemic antibiotics  · Continue probiotics    Stage 3a chronic kidney disease (HCC)  Assessment & Plan  · Creatinine currently at baseline  · Mild renal insufficiency, will hold lasix at this time    Chronic diastolic congestive heart failure [facility])  Assessment & Plan  Wt Readings from Last 3 Encounters:   06/07/22 (!) 205 kg (451 lb 15 1 oz)   05/08/22 (!) 212 kg (467 lb)   05/06/22 (!) 193 kg (425 lb)     · Diuresed for lower extremity swelling with IV Lasix  · Creatinine slightly elevated, will discontinue IV diuresis  · Monitor BMP, likely resume p o  Lasix tomorrow  · Continue fluid restriction, low-sodium diet    Other specified hypothyroidism  Assessment & Plan  · Continue levothyroxine        VTE Pharmacologic Prophylaxis:   Pharmacologic: Warfarin (Coumadin)  Mechanical VTE Prophylaxis in Place: Yes    Patient Centered Rounds: I have performed bedside rounds with nursing staff today  Discussions with Specialists or Other Care Team Provider: MICHELLE LEES    Education and Discussions with Family / Patient: patient    Time Spent for Care: 30 minutes  More than 50% of total time spent on counseling and coordination of care as described above  Current Length of Stay: 11 day(s)    Current Patient Status: Inpatient   Certification Statement: The patient will continue to require additional inpatient hospital stay due to IV abx    Discharge Plan: pending    Code Status: Level 1 - Full Code       Subjective:   Patient seen today, reports doing well  No complaints at this time  Objective:     Vitals:   Temp (24hrs), Av °F (36 7 °C), Min:97 8 °F (36 6 °C), Max:98 1 °F (36 7 °C)    Temp:  [97 8 °F (36 6 °C)-98 1 °F (36 7 °C)] 97 8 °F (36 6 °C)  HR:  [60-71] 60  Resp:  [14-20] 18  BP: ()/(54-66) 114/66  SpO2:  [94 %-98 %] 95 %  Body mass index is 82 66 kg/m²  Input and Output Summary (last 24 hours): Intake/Output Summary (Last 24 hours) at 2022 1108  Last data filed at 2022 0901  Gross per 24 hour   Intake 440 ml   Output --   Net 440 ml       Physical Exam:     Physical Exam  Vitals and nursing note reviewed  Constitutional:       Appearance: She is obese  HENT:      Head: Normocephalic and atraumatic  Eyes:      General: No scleral icterus  Pupils: Pupils are equal, round, and reactive to light  Cardiovascular:      Rate and Rhythm: Normal rate and regular rhythm        Heart sounds: Normal heart sounds  Pulmonary:      Effort: Pulmonary effort is normal  No respiratory distress  Comments: Difficult to assess due to body habitus  Abdominal:      Comments: Obese, panniculitis   Musculoskeletal:         General: No swelling  Skin:     General: Skin is warm  Findings: Erythema present  Neurological:      General: No focal deficit present  Mental Status: She is alert and oriented to person, place, and time  Mental status is at baseline  Psychiatric:         Mood and Affect: Mood normal        Additional Data:     Labs:    Results from last 7 days   Lab Units 06/06/22  0510   WBC Thousand/uL 9 67   HEMOGLOBIN g/dL 10 4*   HEMATOCRIT % 34 8   PLATELETS Thousands/uL 375     Results from last 7 days   Lab Units 06/07/22  0421 06/06/22  0510 06/05/22  0427   SODIUM mmol/L 133*   < > 133*   POTASSIUM mmol/L 3 6   < > 3 4*   CHLORIDE mmol/L 95*   < > 95*   CO2 mmol/L 29   < > 31   BUN mg/dL 41*   < > 34*   CREATININE mg/dL 1 44*   < > 1 29   ANION GAP mmol/L 9   < > 7   CALCIUM mg/dL 9 1   < > 9 0   ALBUMIN g/dL  --   --  3 0*   TOTAL BILIRUBIN mg/dL  --   --  0 73   ALK PHOS U/L  --   --  91   ALT U/L  --   --  14   AST U/L  --   --  19   GLUCOSE RANDOM mg/dL 110   < > 130    < > = values in this interval not displayed  Results from last 7 days   Lab Units 06/07/22  0421   INR  2 93*                       * I Have Reviewed All Lab Data Listed Above  * Additional Pertinent Lab Tests Reviewed: [facility] 66 Admission Reviewed    Imaging:    US superficial lump (non extremity)    Result Date: 6/1/2022  Impression: Diffuse subcutaneous edema without organized fluid collection  Workstation performed: [id number]       Recent Cultures (last 7 days):     Results from last 7 days   Lab Units 06/03/22  0406 05/31/22  1324   BLOOD CULTURE  No Growth After 4 Days  No Growth After 4 Days    --    GRAM STAIN RESULT   --  1+ Gram negative rods*  No polys seen*   WOUND CULTURE   -- 3+ Growth of Pseudomonas aeruginosa*  3+ Growth of Pseudomonas aeruginosa MDR*  3+ Growth of Proteus mirabilis*  2+ Growth of        Last 24 Hours Medication List:   Current Facility-Administered Medications   Medication Dose Route Frequency Provider Last Rate    acetaminophen  650 mg Oral Q4H PRN Kecia Balling, PA-C      allopurinol  100 mg Oral Daily [place], [place]      cefepime  2,000 mg Intravenous Q12H Pérez Marquez DO 2,000 mg (06/07/22 0045)    DULoxetine  20 mg Oral Daily Kecia [facility], PA-C      levothyroxine  125 mcg Oral Daily [place], [place]      ondansetron  4 mg Intravenous Q6H PRN Kecia Balling, PA-C      saccharomyces boulardii  250 mg Oral BID [place], [place]      triamcinolone   Topical BID [place], [place]      vancomycin  125 mg Oral HOSP [place], [place]      [START ON 6/8/2022] warfarin  5 mg Oral Every Other Day Jennifer White MD      And   Armand Dalton warfarin  2 mg Oral Every Other Verba Koyanagi, MD          Today, Patient Was Seen By: Jennifer White MD    ** Please Note: Dictation voice to text software may have been used in the creation of this document   **

## 2022-06-08 LAB
ANION GAP SERPL CALCULATED.3IONS-SCNC: 8 MMOL/L (ref 4–13)
BACTERIA BLD CULT: NORMAL
BACTERIA BLD CULT: NORMAL
BUN SERPL-MCNC: 43 MG/DL (ref 5–25)
CALCIUM SERPL-MCNC: 9.1 MG/DL (ref 8.4–10.2)
CHLORIDE SERPL-SCNC: 96 MMOL/L (ref 96–108)
CO2 SERPL-SCNC: 30 MMOL/L (ref 21–32)
CREAT SERPL-MCNC: 1.46 MG/DL (ref 0.6–1.3)
GFR SERPL CREATININE-BSD FRML MDRD: 36 ML/MIN/1.73SQ M
GLUCOSE SERPL-MCNC: 101 MG/DL (ref 65–140)
POTASSIUM SERPL-SCNC: 3.5 MMOL/L (ref 3.5–5.3)
SODIUM SERPL-SCNC: 134 MMOL/L (ref 135–147)

## 2022-06-08 PROCEDURE — 99232 SBSQ HOSP IP/OBS MODERATE 35: CPT | Performed by: STUDENT IN AN ORGANIZED HEALTH CARE EDUCATION/TRAINING PROGRAM

## 2022-06-08 PROCEDURE — 80048 BASIC METABOLIC PNL TOTAL CA: CPT | Performed by: STUDENT IN AN ORGANIZED HEALTH CARE EDUCATION/TRAINING PROGRAM

## 2022-06-08 RX ORDER — POTASSIUM CHLORIDE 20 MEQ/1
40 TABLET, EXTENDED RELEASE ORAL ONCE
Status: COMPLETED | OUTPATIENT
Start: 2022-06-08 | End: 2022-06-08

## 2022-06-08 RX ORDER — LEVOFLOXACIN 750 MG/1
750 TABLET ORAL EVERY 24 HOURS
Status: DISCONTINUED | OUTPATIENT
Start: 2022-06-10 | End: 2022-06-09

## 2022-06-08 RX ADMIN — ALLOPURINOL 100 MG: 100 TABLET ORAL at 09:11

## 2022-06-08 RX ADMIN — WARFARIN SODIUM 5 MG: 5 TABLET ORAL at 17:56

## 2022-06-08 RX ADMIN — Medication 125 MG: at 17:55

## 2022-06-08 RX ADMIN — DULOXETINE 20 MG: 20 CAPSULE, DELAYED RELEASE ORAL at 09:11

## 2022-06-08 RX ADMIN — Medication 125 MG: at 12:00

## 2022-06-08 RX ADMIN — LEVOTHYROXINE SODIUM 125 MCG: 25 TABLET ORAL at 09:11

## 2022-06-08 RX ADMIN — Medication 125 MG: at 05:02

## 2022-06-08 RX ADMIN — CEFEPIME HYDROCHLORIDE 2000 MG: 2 INJECTION, SOLUTION INTRAVENOUS at 02:00

## 2022-06-08 RX ADMIN — Medication 250 MG: at 17:55

## 2022-06-08 RX ADMIN — POTASSIUM CHLORIDE 40 MEQ: 1500 TABLET, EXTENDED RELEASE ORAL at 09:18

## 2022-06-08 RX ADMIN — CEFEPIME HYDROCHLORIDE 2000 MG: 2 INJECTION, SOLUTION INTRAVENOUS at 17:57

## 2022-06-08 RX ADMIN — TRIAMCINOLONE ACETONIDE: 1 CREAM TOPICAL at 12:00

## 2022-06-08 RX ADMIN — Medication 250 MG: at 09:11

## 2022-06-08 RX ADMIN — Medication 125 MG: at 23:54

## 2022-06-08 NOTE — PROGRESS NOTES
[address]  Progress Note - Diane Neena 1950, 80 y o  female MRN: [id number]  Unit/Bed#: -01 Encounter: [id number]  Primary Care Provider: ORACIO Peres   Date and time admitted to hospital: 5/27/2022  7:52 PM    * Panniculitis  Assessment & Plan  52-year-old female with morbid obesity and diastolic CHF recently hospitalized at [facility] who re-presented for recurrent cellulitis of pannus in setting of morbid obesity, chronic lymphedema, increased moisture and superficial skin breakdown  · Wound culture with pseudomonas and proteus  · Continue cefepime 2gm q8 until 6/9, then levaquin 750mg thereafter on 06/10  · Surgery evaluated, no surgical intervention or abscess noted  · Continue aggressive skin care with minimizing friction and moisture related skin breakdown  Frequent offloading and repositioning       BMI 70 and over, adult [facility])  Assessment & Plan  · Continue supportive care  · Risk factor for recurrent infections    Positive blood culture  Assessment & Plan  · Positive blood culture may be contaminant as is coag negative staph  · Does have history of MRSE bacteremia  · Appreciate infectious disease evaluation    · Low suspicion for true bacteremia and endovascular infection  · Completed vancomycin until 06/05    Chronic atrial fibrillation (HCC)  Assessment & Plan  · Not on BB  · INR therapeutic, continue coumadin    History of Clostridioides difficile infection  Assessment & Plan  · Continue prophylactic oral vancomycin x72 hours beyond systemic antibiotics  · Continue probiotics    Stage 3a chronic kidney disease (HCC)  Assessment & Plan  · Creatinine currently at baseline  · Mild renal insufficiency, will hold lasix at this time    Chronic diastolic congestive heart failure [facility])  Assessment & Plan  Wt Readings from Last 3 Encounters:   06/08/22 (!) 203 kg (447 lb 15 6 oz)   05/08/22 (!) 212 kg (467 lb)   05/06/22 (!) 193 kg (425 lb) · Diuresed for lower extremity swelling with IV Lasix  · Creatinine slightly elevated, will discontinue IV diuresis  · Monitor BMP, likely resume p o  Lasix tomorrow  · Continue fluid restriction, low-sodium diet    Other specified hypothyroidism  Assessment & Plan  · Continue levothyroxine        VTE Pharmacologic Prophylaxis:   Pharmacologic: Warfarin (Coumadin)  Mechanical VTE Prophylaxis in Place: Yes    Patient Centered Rounds: I have performed bedside rounds with nursing staff today  Discussions with Specialists or Other Care Team Provider: NABEEL    Education and Discussions with Family / Patient: patient    Time Spent for Care: 30 minutes  More than 50% of total time spent on counseling and coordination of care as described above  Current Length of Stay: 12 day(s)    Current Patient Status: Inpatient   Certification Statement: The patient will continue to require additional inpatient hospital stay due to IV abx    Discharge Plan: pending, Friday if caregivers available, otherwise next Tues    Code Status: Level 1 - Full Code      Subjective:   No events overnight  Discussed holding lasix due to renal functions, reports poor fluid intake  Objective:     Vitals:   Temp (24hrs), Av 9 °F (36 6 °C), Min:97 8 °F (36 6 °C), Max:98 °F (36 7 °C)    Temp:  [97 8 °F (36 6 °C)-98 °F (36 7 °C)] 98 °F (36 7 °C)  HR:  [60-66] 60  Resp:  [18] 18  BP: ()/(49-63) 100/63  SpO2:  [92 %-96 %] 93 %  Body mass index is 81 94 kg/m²  Input and Output Summary (last 24 hours): Intake/Output Summary (Last 24 hours) at 2022 1201  Last data filed at 2022 0230  Gross per 24 hour   Intake 440 ml   Output --   Net 440 ml       Physical Exam:     Physical Exam  Vitals and nursing note reviewed  Constitutional:       Appearance: She is obese  HENT:      Head: Normocephalic and atraumatic  Eyes:      General: No scleral icterus  Pupils: Pupils are equal, round, and reactive to light  Cardiovascular:      Rate and Rhythm: Normal rate and regular rhythm  Heart sounds: Normal heart sounds  Pulmonary:      Effort: Pulmonary effort is normal  No respiratory distress  Comments: Difficult to assess due to body habitus  Abdominal:      Comments: Obese, panniculitis   Musculoskeletal:         General: No swelling  Skin:     General: Skin is warm  Findings: Erythema present  Neurological:      General: No focal deficit present  Mental Status: She is alert and oriented to person, place, and time  Mental status is at baseline  Psychiatric:         Mood and Affect: Mood normal        Additional Data:     Labs:    Results from last 7 days   Lab Units 06/06/22  0510   WBC Thousand/uL 9 67   HEMOGLOBIN g/dL 10 4*   HEMATOCRIT % 34 8   PLATELETS Thousands/uL 375     Results from last 7 days   Lab Units 06/08/22  0507 06/06/22  0510 06/05/22  0427   SODIUM mmol/L 134*   < > 133*   POTASSIUM mmol/L 3 5   < > 3 4*   CHLORIDE mmol/L 96   < > 95*   CO2 mmol/L 30   < > 31   BUN mg/dL 43*   < > 34*   CREATININE mg/dL 1 46*   < > 1 29   ANION GAP mmol/L 8   < > 7   CALCIUM mg/dL 9 1   < > 9 0   ALBUMIN g/dL  --   --  3 0*   TOTAL BILIRUBIN mg/dL  --   --  0 73   ALK PHOS U/L  --   --  91   ALT U/L  --   --  14   AST U/L  --   --  19   GLUCOSE RANDOM mg/dL 101   < > 130    < > = values in this interval not displayed  Results from last 7 days   Lab Units 06/07/22  0421   INR  2 93*                       * I Have Reviewed All Lab Data Listed Above  * Additional Pertinent Lab Tests Reviewed: [place] [facility] Admission Reviewed    Imaging:    US superficial lump (non extremity)    Result Date: 6/1/2022  Impression: Diffuse subcutaneous edema without organized fluid collection  Workstation performed: [id number]       Recent Cultures (last 7 days):     Results from last 7 days   Lab Units 06/03/22  0406   BLOOD CULTURE  No Growth After 5 Days  No Growth After 5 Days  Last 24 Hours Medication List:   Current Facility-Administered Medications   Medication Dose Route Frequency Provider Last Rate    acetaminophen  650 mg Oral Q4H PRN Gloria Ceja PA-C      allopurinol  100 mg Oral Daily [facility] [place], [place]      cefepime  2,000 mg Intravenous Q12H Bijan Hsu MD Stopped (06/08/22 0230)    DULoxetine  20 mg Oral Daily [place], [place]      [START ON 6/10/2022] levofloxacin  750 mg Oral Q24H Bijan Hsu MD      levothyroxine  125 mcg Oral Daily [place], [place]      ondansetron  4 mg Intravenous Q6H PRN Gloria Ceja PA-C      saccharomyces boulardii  250 mg Oral BID Meka Jules      triamcinolone   Topical BID [place], [place]      vancomycin  125 mg Oral HOSP [place], [place]      warfarin  5 mg Oral Every Other Day Bijan Hsu MD      And   Kendrick James warfarin  2 mg Oral Every Other Isabeljane Mar MD          Today, Patient Was Seen By: Bijan Hsu MD    ** Please Note: Dictation voice to text software may have been used in the creation of this document   **

## 2022-06-08 NOTE — ASSESSMENT & PLAN NOTE
Wt Readings from Last 3 Encounters:   06/08/22 (!) 203 kg (447 lb 15 6 oz)   05/08/22 (!) 212 kg (467 lb)   05/06/22 (!) 193 kg (425 lb)     · Diuresed for lower extremity swelling with IV Lasix  · Creatinine slightly elevated, will discontinue IV diuresis  · Monitor BMP, likely resume p o   Lasix tomorrow  · Continue fluid restriction, low-sodium diet

## 2022-06-08 NOTE — ASSESSMENT & PLAN NOTE
52-year-old female with morbid obesity and diastolic CHF recently hospitalized at [facility] who re-presented for recurrent cellulitis of pannus in setting of morbid obesity, chronic lymphedema, increased moisture and superficial skin breakdown  · Wound culture with pseudomonas and proteus  · Continue cefepime 2gm q8 until 6/9, then levaquin 750mg thereafter on 06/10  · Surgery evaluated, no surgical intervention or abscess noted  · Continue aggressive skin care with minimizing friction and moisture related skin breakdown   Frequent offloading and repositioning

## 2022-06-09 PROBLEM — N17.9 AKI (ACUTE KIDNEY INJURY) (HCC): Status: ACTIVE | Noted: 2022-06-09

## 2022-06-09 LAB
AMORPH URATE CRY URNS QL MICRO: ABNORMAL /HPF
ANION GAP SERPL CALCULATED.3IONS-SCNC: 8 MMOL/L (ref 4–13)
BACTERIA UR QL AUTO: ABNORMAL /HPF
BASOPHILS # BLD AUTO: 0.07 THOUSANDS/ΜL (ref 0–0.1)
BASOPHILS NFR BLD AUTO: 1 % (ref 0–1)
BILIRUB UR QL STRIP: NEGATIVE
BUN SERPL-MCNC: 47 MG/DL (ref 5–25)
CALCIUM SERPL-MCNC: 9.1 MG/DL (ref 8.4–10.2)
CHLORIDE SERPL-SCNC: 96 MMOL/L (ref 96–108)
CLARITY UR: ABNORMAL
CO2 SERPL-SCNC: 27 MMOL/L (ref 21–32)
COLOR UR: YELLOW
CREAT SERPL-MCNC: 1.56 MG/DL (ref 0.6–1.3)
CREAT UR-MCNC: 70 MG/DL
EOSINOPHIL # BLD AUTO: 0.26 THOUSAND/ΜL (ref 0–0.61)
EOSINOPHIL NFR BLD AUTO: 3 % (ref 0–6)
ERYTHROCYTE [DISTWIDTH] IN BLOOD BY AUTOMATED COUNT: 16.5 % (ref 11.6–15.1)
GFR SERPL CREATININE-BSD FRML MDRD: 33 ML/MIN/1.73SQ M
GLUCOSE SERPL-MCNC: 107 MG/DL (ref 65–140)
GLUCOSE UR STRIP-MCNC: NEGATIVE MG/DL
HCT VFR BLD AUTO: 34.8 % (ref 34.8–46.1)
HGB BLD-MCNC: 10.3 G/DL (ref 11.5–15.4)
HGB UR QL STRIP.AUTO: ABNORMAL
IMM GRANULOCYTES # BLD AUTO: 0.03 THOUSAND/UL (ref 0–0.2)
IMM GRANULOCYTES NFR BLD AUTO: 0 % (ref 0–2)
INR PPP: 2.84 (ref 0.84–1.19)
KETONES UR STRIP-MCNC: NEGATIVE MG/DL
LEUKOCYTE ESTERASE UR QL STRIP: NEGATIVE
LYMPHOCYTES # BLD AUTO: 1.11 THOUSANDS/ΜL (ref 0.6–4.47)
LYMPHOCYTES NFR BLD AUTO: 11 % (ref 14–44)
MCH RBC QN AUTO: 24.3 PG (ref 26.8–34.3)
MCHC RBC AUTO-ENTMCNC: 29.6 G/DL (ref 31.4–37.4)
MCV RBC AUTO: 82 FL (ref 82–98)
MONOCYTES # BLD AUTO: 0.68 THOUSAND/ΜL (ref 0.17–1.22)
MONOCYTES NFR BLD AUTO: 7 % (ref 4–12)
NEUTROPHILS # BLD AUTO: 8.1 THOUSANDS/ΜL (ref 1.85–7.62)
NEUTS SEG NFR BLD AUTO: 78 % (ref 43–75)
NITRITE UR QL STRIP: NEGATIVE
NON-SQ EPI CELLS URNS QL MICRO: ABNORMAL /HPF
NRBC BLD AUTO-RTO: 0 /100 WBCS
OSMOLALITY UR: 282 MMOL/KG
OTHER STN SPEC: ABNORMAL
PH UR STRIP.AUTO: 5.5 [PH]
PLATELET # BLD AUTO: 370 THOUSANDS/UL (ref 149–390)
PMV BLD AUTO: 8.7 FL (ref 8.9–12.7)
POTASSIUM SERPL-SCNC: 3.9 MMOL/L (ref 3.5–5.3)
PROT UR STRIP-MCNC: ABNORMAL MG/DL
PROTHROMBIN TIME: 29.1 SECONDS (ref 11.6–14.5)
RBC # BLD AUTO: 4.24 MILLION/UL (ref 3.81–5.12)
RBC #/AREA URNS AUTO: ABNORMAL /HPF
SODIUM SERPL-SCNC: 131 MMOL/L (ref 135–147)
SP GR UR STRIP.AUTO: 1.02 (ref 1–1.03)
UROBILINOGEN UR QL STRIP.AUTO: 0.2 E.U./DL
UUN 24H UR-MCNC: 443 MG/DL
WBC # BLD AUTO: 10.25 THOUSAND/UL (ref 4.31–10.16)
WBC #/AREA URNS AUTO: ABNORMAL /HPF

## 2022-06-09 PROCEDURE — 84300 ASSAY OF URINE SODIUM: CPT | Performed by: NURSE PRACTITIONER

## 2022-06-09 PROCEDURE — 82570 ASSAY OF URINE CREATININE: CPT | Performed by: STUDENT IN AN ORGANIZED HEALTH CARE EDUCATION/TRAINING PROGRAM

## 2022-06-09 PROCEDURE — 81001 URINALYSIS AUTO W/SCOPE: CPT | Performed by: STUDENT IN AN ORGANIZED HEALTH CARE EDUCATION/TRAINING PROGRAM

## 2022-06-09 PROCEDURE — 85025 COMPLETE CBC W/AUTO DIFF WBC: CPT | Performed by: STUDENT IN AN ORGANIZED HEALTH CARE EDUCATION/TRAINING PROGRAM

## 2022-06-09 PROCEDURE — 85610 PROTHROMBIN TIME: CPT | Performed by: STUDENT IN AN ORGANIZED HEALTH CARE EDUCATION/TRAINING PROGRAM

## 2022-06-09 PROCEDURE — 84540 ASSAY OF URINE/UREA-N: CPT | Performed by: STUDENT IN AN ORGANIZED HEALTH CARE EDUCATION/TRAINING PROGRAM

## 2022-06-09 PROCEDURE — 80048 BASIC METABOLIC PNL TOTAL CA: CPT | Performed by: STUDENT IN AN ORGANIZED HEALTH CARE EDUCATION/TRAINING PROGRAM

## 2022-06-09 PROCEDURE — 99232 SBSQ HOSP IP/OBS MODERATE 35: CPT | Performed by: STUDENT IN AN ORGANIZED HEALTH CARE EDUCATION/TRAINING PROGRAM

## 2022-06-09 PROCEDURE — 83935 ASSAY OF URINE OSMOLALITY: CPT | Performed by: STUDENT IN AN ORGANIZED HEALTH CARE EDUCATION/TRAINING PROGRAM

## 2022-06-09 RX ORDER — SODIUM CHLORIDE 9 MG/ML
100 INJECTION, SOLUTION INTRAVENOUS CONTINUOUS
Status: DISCONTINUED | OUTPATIENT
Start: 2022-06-09 | End: 2022-06-09

## 2022-06-09 RX ADMIN — TRIAMCINOLONE ACETONIDE: 1 CREAM TOPICAL at 10:00

## 2022-06-09 RX ADMIN — Medication 250 MG: at 09:00

## 2022-06-09 RX ADMIN — ALLOPURINOL 100 MG: 100 TABLET ORAL at 09:00

## 2022-06-09 RX ADMIN — Medication 125 MG: at 11:58

## 2022-06-09 RX ADMIN — LEVOTHYROXINE SODIUM 125 MCG: 25 TABLET ORAL at 08:59

## 2022-06-09 RX ADMIN — CEFEPIME HYDROCHLORIDE 2000 MG: 2 INJECTION, SOLUTION INTRAVENOUS at 14:03

## 2022-06-09 RX ADMIN — SODIUM CHLORIDE 100 ML/HR: 0.9 INJECTION, SOLUTION INTRAVENOUS at 09:03

## 2022-06-09 RX ADMIN — Medication 125 MG: at 05:07

## 2022-06-09 RX ADMIN — Medication 125 MG: at 18:23

## 2022-06-09 RX ADMIN — Medication 125 MG: at 23:50

## 2022-06-09 RX ADMIN — DULOXETINE 20 MG: 20 CAPSULE, DELAYED RELEASE ORAL at 09:00

## 2022-06-09 RX ADMIN — ACETAMINOPHEN 650 MG: 325 TABLET ORAL at 11:57

## 2022-06-09 RX ADMIN — WARFARIN SODIUM 2 MG: 2 TABLET ORAL at 18:22

## 2022-06-09 RX ADMIN — Medication 250 MG: at 18:22

## 2022-06-09 RX ADMIN — CEFEPIME HYDROCHLORIDE 2000 MG: 2 INJECTION, SOLUTION INTRAVENOUS at 02:18

## 2022-06-09 NOTE — PROGRESS NOTES
Donnie U  67   Progress Note - Tylor Perry 1950, 80 y o  female MRN: [id number]  Unit/Bed#: -01 Encounter: [id number]  Primary Care Provider: ORACIO Hooper   Date and time admitted to hospital: 5/27/2022  7:52 PM    * Panniculitis  Assessment & Plan  73-year-old female with morbid obesity and diastolic CHF recently hospitalized at [facility] who re-presented for recurrent cellulitis of pannus in setting of morbid obesity, chronic lymphedema, increased moisture and superficial skin breakdown  · Wound culture with pseudomonas and proteus  · Continue cefepime 2gm q8 until 6/9  · PO vanc for c diff until 06/12 for prophylaxis  · Surgery evaluated, no surgical intervention or abscess noted  · Continue aggressive skin care with minimizing friction and moisture related skin breakdown  Frequent offloading and repositioning  · Recommend that patient be evaluated outpatient by plastic surgery outpatient for chronic lymphedema, and bariatrics      BMI 70 and over, adult [facility])  Assessment & Plan  · Continue supportive care  · Risk factor for recurrent infections    Positive blood culture  Assessment & Plan  · Positive blood culture may be contaminant as is coag negative staph  · Does have history of MRSE bacteremia  · Appreciate infectious disease evaluation    · Low suspicion for true bacteremia and endovascular infection  · Completed vancomycin until 06/05    Chronic atrial fibrillation (HCC)  Assessment & Plan  · Not on BB  · INR therapeutic, continue coumadin    History of Clostridioides difficile infection  Assessment & Plan  · Continue prophylactic oral vancomycin x72 hours beyond systemic antibiotics  · Continue probiotics    Acute kidney injury superimposed on CKD ([facility] [address] )  Assessment & Plan  · Creatininine continue to be elevated, possibly due to overdiuresis  · Will check urine studies, NA, OSM, Urea and Creatinine  · Held lasix, last given on 06/06  · Will give IVFs    Chronic diastolic congestive heart failure (HCC)  Assessment & Plan  Wt Readings from Last 3 Encounters:   22 (!) 203 kg (447 lb 15 6 oz)   22 (!) 212 kg (467 lb)   22 (!) 193 kg (425 lb)     · Diuresed for lower extremity swelling with IV Lasix  · Monitor BMP, hold diuretics at this time  · Continue fluid restriction, low-sodium diet    Other specified hypothyroidism  Assessment & Plan  · Continue levothyroxine    VTE Pharmacologic Prophylaxis:   Pharmacologic: Warfarin (Coumadin)  Mechanical VTE Prophylaxis in Place: Yes    Patient Centered Rounds: I have performed bedside rounds with nursing staff today  Discussions with Specialists or Other Care Team Provider: Infectious Disease    Education and Discussions with Family / Patient: patient    Time Spent for Care: 30 minutes  More than 50% of total time spent on counseling and coordination of care as described above  Current Length of Stay: 13 day(s)    Current Patient Status: Inpatient   Certification Statement: The patient will continue to require additional inpatient hospital stay due to IV abx    Discharge Plan: Possible home tomorrow if renal functions improve, but reports caregiver unable to be available until     Code Status: Level 1 - Full Code      Subjective:   Patient seen and examined at bedside  Reports doing well  Denies fevers or chills  Tolerating diet  Objective:     Vitals:   Temp (24hrs), Av 9 °F (36 6 °C), Min:97 7 °F (36 5 °C), Max:98 °F (36 7 °C)    Temp:  [97 7 °F (36 5 °C)-98 °F (36 7 °C)] 97 9 °F (36 6 °C)  HR:  [61-71] 71  Resp:  [17-18] 18  BP: (110-127)/(63-75) 110/63  SpO2:  [94 %-98 %] 94 %  Body mass index is 81 94 kg/m²  Input and Output Summary (last 24 hours):        Intake/Output Summary (Last 24 hours) at 2022 0958  Last data filed at 2022 0824  Gross per 24 hour   Intake 360 ml   Output --   Net 360 ml       Physical Exam:     Physical Exam  Vitals and nursing note reviewed  Constitutional:       Appearance: She is obese  HENT:      Head: Normocephalic and atraumatic  Eyes:      General: No scleral icterus  Pupils: Pupils are equal, round, and reactive to light  Cardiovascular:      Rate and Rhythm: Normal rate and regular rhythm  Heart sounds: Normal heart sounds  Pulmonary:      Effort: Pulmonary effort is normal  No respiratory distress  Comments: Difficult to assess due to body habitus  Abdominal:      Comments: Obese, panniculitis   Musculoskeletal:         General: No swelling  Skin:     General: Skin is warm  Neurological:      General: No focal deficit present  Mental Status: She is alert and oriented to person, place, and time  Mental status is at baseline  Psychiatric:         Mood and Affect: Mood normal            Additional Data:     Labs:    Results from last 7 days   Lab Units 06/09/22  0438   WBC Thousand/uL 10 25*   HEMOGLOBIN g/dL 10 3*   HEMATOCRIT % 34 8   PLATELETS Thousands/uL 370   NEUTROS PCT % 78*   LYMPHS PCT % 11*   MONOS PCT % 7   EOS PCT % 3     Results from last 7 days   Lab Units 06/09/22  0438 06/06/22  0510 06/05/22  0427   SODIUM mmol/L 131*   < > 133*   POTASSIUM mmol/L 3 9   < > 3 4*   CHLORIDE mmol/L 96   < > 95*   CO2 mmol/L 27   < > 31   BUN mg/dL 47*   < > 34*   CREATININE mg/dL 1 56*   < > 1 29   ANION GAP mmol/L 8   < > 7   CALCIUM mg/dL 9 1   < > 9 0   ALBUMIN g/dL  --   --  3 0*   TOTAL BILIRUBIN mg/dL  --   --  0 73   ALK PHOS U/L  --   --  91   ALT U/L  --   --  14   AST U/L  --   --  19   GLUCOSE RANDOM mg/dL 107   < > 130    < > = values in this interval not displayed  Results from last 7 days   Lab Units 06/09/22  0438   INR  2 84*                       * I Have Reviewed All Lab Data Listed Above  * Additional Pertinent Lab Tests Reviewed:  Tracy 67 Admission Reviewed    Imaging:    None    Recent Cultures (last 7 days):     Results from last 7 days   Lab Units 06/03/22  0406   BLOOD CULTURE  No Growth After 5 Days  No Growth After 5 Days  Last 24 Hours Medication List:   Current Facility-Administered Medications   Medication Dose Route Frequency Provider Last Rate    acetaminophen  650 mg Oral Q4H PRN Renetta Ruffin PA-C      allopurinol  100 mg Oral Daily [place], [place]      cefepime  2,000 mg Intravenous Q12H Darcie Gonzáles MD 2,000 mg (06/09/22 0218)    DULoxetine  20 mg Oral Daily [place], [place]      [START ON 6/10/2022] levofloxacin  750 mg Oral Q24H Darcie Gonzáles MD      levothyroxine  125 mcg Oral Daily [place], [place]      ondansetron  4 mg Intravenous Q6H PRN Renetta Ruffin PA-C      saccharomyces boulardii  250 mg Oral BID [place], [place]      sodium chloride  100 mL/hr Intravenous Continuous Darcie Gonzáles  mL/hr (06/09/22 0903)    triamcinolone   Topical BID Renetta Ruffin PA-C      vancomycin  125 mg Oral [facility] [place]      warfarin  5 mg Oral Every Other Day Darcie Gonzáles MD      And   Teofilo Eleno warfarin  2 mg Oral Every Other Mercedez Guidry MD          Today, Patient Was Seen By: Darcie Gonzáles MD    ** Please Note: Dictation voice to text software may have been used in the creation of this document   **

## 2022-06-09 NOTE — ASSESSMENT & PLAN NOTE
Wt Readings from Last 3 Encounters:   06/09/22 (!) 203 kg (447 lb 15 6 oz)   05/08/22 (!) 212 kg (467 lb)   05/06/22 (!) 193 kg (425 lb)     · Diuresed for lower extremity swelling with IV Lasix  · Monitor BMP, hold diuretics at this time  · Continue fluid restriction, low-sodium diet

## 2022-06-09 NOTE — PLAN OF CARE
Problem: Potential for Falls  Goal: Patient will remain free of falls  Description: INTERVENTIONS:  - Educate patient/family on patient safety including physical limitations  - Instruct patient to call for assistance with activity   - Consult OT/PT to assist with strengthening/mobility   - Keep Call bell within reach  - Keep bed low and locked with side rails adjusted as appropriate  - Keep care items and personal belongings within reach  - Initiate and maintain comfort rounds  - Make Fall Risk Sign visible to staff  - Offer Toileting in advance of need  - Initiate/Maintain bed alarm  - Obtain necessary fall risk management equipment:   - Apply yellow socks and bracelet for high fall risk patients  - Consider moving patient to room near nurses station  Outcome: Progressing     Problem: INFECTION - ADULT  Goal: Absence or prevention of progression during hospitalization  Description: INTERVENTIONS:  - Assess and monitor for signs and symptoms of infection  - Monitor lab/diagnostic results  - Monitor all insertion sites, i e  indwelling lines, tubes, and drains  - Monitor endotracheal if appropriate and nasal secretions for changes in amount and color  - [place] appropriate cooling/warming therapies per order  - Administer medications as ordered  - Instruct and encourage patient and family to use good hand hygiene technique  - Identify and instruct in appropriate isolation precautions for identified infection/condition  Outcome: Progressing     Problem: SKIN/TISSUE INTEGRITY - ADULT  Goal: Incision(s), wounds(s) or drain site(s) healing without S/S of infection  Description: INTERVENTIONS  - Assess and document dressing, incision, wound bed, drain sites and surrounding tissue  - Provide patient and family education  - Perform skin care/dressing changes per order  Outcome: Progressing

## 2022-06-09 NOTE — ASSESSMENT & PLAN NOTE
· Creatininine continue to be elevated, possibly due to overdiuresis  · Will check urine studies, NA, OSM, Urea and Creatinine  · Held lasix, last given on 06/06  · Will give IVFs

## 2022-06-09 NOTE — PLAN OF CARE
Problem: Potential for Falls  Goal: Patient will remain free of falls  Description: INTERVENTIONS:  - Educate patient/family on patient safety including physical limitations  - Instruct patient to call for assistance with activity   - Consult OT/PT to assist with strengthening/mobility   - Keep Call bell within reach  - Keep bed low and locked with side rails adjusted as appropriate  - Keep care items and personal belongings within reach  - Initiate and maintain comfort rounds  - Make Fall Risk Sign visible to staff  - Offer Toileting in advance of need  - Initiate/Maintain bed alarm  - Obtain necessary fall risk management equipment:   - Apply yellow socks and bracelet for high fall risk patients  - Consider moving patient to room near nurses station  Outcome: Progressing     Problem: SAFETY ADULT  Goal: Maintain or return to baseline ADL function  Description: INTERVENTIONS:  -  Assess patient's ability to carry out ADLs; assess patient's baseline for ADL function and identify physical deficits which impact ability to perform ADLs (bathing, care of mouth/teeth, toileting, grooming, dressing, etc )  - Assess/evaluate cause of self-care deficits   - Assess range of motion  - Assess patient's mobility; develop plan if impaired  - Assess patient's need for assistive devices and provide as appropriate  - Encourage maximum independence but intervene and supervise when necessary  - Involve family in performance of ADLs  - Assess for home care needs following discharge   - Consider OT consult to assist with ADL evaluation and planning for discharge  - Provide patient education as appropriate  Outcome: Progressing  Goal: Maintains/Returns to pre admission functional level  Description: INTERVENTIONS:  - Perform BMAT or MOVE assessment daily    - Set and communicate daily mobility goal to care team and patient/family/caregiver     - Collaborate with rehabilitation services on mobility goals if consulted  - Reposition patient every two hours    - Record patient progress and toleration of activity level   Outcome: Progressing     Problem: SKIN/TISSUE INTEGRITY - ADULT  Goal: Skin Integrity remains intact(Skin Breakdown Prevention)  Description: Assess:  -Perform Gucci assessment every shift  -Clean and moisturize skin   -Inspect skin when repositioning, toileting, and assisting with ADLS  -Assess under medical devices   -Assess extremities for adequate circulation and sensation     Bed Management:  -Have minimal linens on bed & keep smooth, unwrinkled  -Change linens as needed when moist or perspiring  -Avoid sitting or lying in one position while in bed    Toileting:  -Offer bedside commode  -Assess for incontinence   -Use incontinent care products after each incontinent episode    Activity:  -Encourage or provide ROM exercises   -Turn and reposition patient every two Hours  -Use appropriate equipment to lift or move patient in bed  -Instruct/ Assist with weight shifting when out of bed in chair  -Consider limitation of chair time     Skin Care:  -Avoid use of baby powder, tape, friction and shearing, hot water or constrictive clothing  -Relieve pressure over bony prominences  -Do not massage red bony areas    Next Steps:  -Teach patient strategies to minimize risks    -Consider consults to  interdisciplinary teams   Outcome: Progressing  Goal: Incision(s), wounds(s) or drain site(s) healing without S/S of infection  Description: INTERVENTIONS  - Assess and document dressing, incision, wound bed, drain sites and surrounding tissue  - Provide patient and family education  - Perform skin care/dressing changes per order  Outcome: Progressing  Goal: Pressure injury heals and does not worsen  Description: Interventions:  - Implement low air loss mattress or specialty surface (Criteria met)  - Apply silicone foam dressing  - Instruct/assist with weight shifting when in chair   - Limit chair time   - Use special pressure reducing interventions  when in chair   - Apply fecal or urinary incontinence containment device   - Perform passive or active ROM   - Turn and reposition patient & offload bony prominences every two hours   - Utilize friction reducing device or surface for transfers   - Consider consults to  interdisciplinary teams   - Use incontinent care products after each incontinent episode  - Consider nutrition services referral as needed  Outcome: Progressing     Problem: MOBILITY - ADULT  Goal: Maintain or return to baseline ADL function  Description: INTERVENTIONS:  -  Assess patient's ability to carry out ADLs; assess patient's baseline for ADL function and identify physical deficits which impact ability to perform ADLs (bathing, care of mouth/teeth, toileting, grooming, dressing, etc )  - Assess/evaluate cause of self-care deficits   - Assess range of motion  - Assess patient's mobility; develop plan if impaired  - Assess patient's need for assistive devices and provide as appropriate  - Encourage maximum independence but intervene and supervise when necessary  - Involve family in performance of ADLs  - Assess for home care needs following discharge   - Consider OT consult to assist with ADL evaluation and planning for discharge  - Provide patient education as appropriate  Outcome: Progressing  Goal: Maintains/Returns to pre admission functional level  Description: INTERVENTIONS:  - Perform BMAT or MOVE assessment daily    - Set and communicate daily mobility goal to care team and patient/family/caregiver  - Collaborate with rehabilitation services on mobility goals if consulted  - Reposition patient every two hours    - Record patient progress and toleration of activity level   Outcome: Progressing

## 2022-06-09 NOTE — PLAN OF CARE
Problem: Potential for Falls  Goal: Patient will remain free of falls  Description: INTERVENTIONS:  - Educate patient/family on patient safety including physical limitations  - Instruct patient to call for assistance with activity   - Consult OT/PT to assist with strengthening/mobility   - Keep Call bell within reach  - Keep bed low and locked with side rails adjusted as appropriate  - Keep care items and personal belongings within reach  - Initiate and maintain comfort rounds  - Make Fall Risk Sign visible to staff  - Offer Toileting in advance of need  - Initiate/Maintain bed alarm  - Obtain necessary fall risk management equipment:   - Apply yellow socks and bracelet for high fall risk patients  - Consider moving patient to room near nurses station  Outcome: Progressing     Problem: PAIN - ADULT  Goal: Verbalizes/displays adequate comfort level or baseline comfort level  Description: Interventions:  - Encourage patient to monitor pain and request assistance  - Assess pain using appropriate pain scale  - Administer analgesics based on type and severity of pain and evaluate response  - Implement non-pharmacological measures as appropriate and evaluate response  - Consider cultural and social influences on pain and pain management  - Notify physician/advanced practitioner if interventions unsuccessful or patient reports new pain  Outcome: Progressing     Problem: INFECTION - ADULT  Goal: Absence or prevention of progression during hospitalization  Description: INTERVENTIONS:  - Assess and monitor for signs and symptoms of infection  - Monitor lab/diagnostic results  - Monitor all insertion sites, i e  indwelling lines, tubes, and drains  - Monitor endotracheal if appropriate and nasal secretions for changes in amount and color  - [place] appropriate cooling/warming therapies per order  - Administer medications as ordered  - Instruct and encourage patient and family to use good hand hygiene technique  - Identify and instruct in appropriate isolation precautions for identified infection/condition  Outcome: Progressing  Goal: Absence of fever/infection during neutropenic period  Description: INTERVENTIONS:  - Monitor WBC    Outcome: Progressing     Problem: SAFETY ADULT  Goal: Maintain or return to baseline ADL function  Description: INTERVENTIONS:  -  Assess patient's ability to carry out ADLs; assess patient's baseline for ADL function and identify physical deficits which impact ability to perform ADLs (bathing, care of mouth/teeth, toileting, grooming, dressing, etc )  - Assess/evaluate cause of self-care deficits   - Assess range of motion  - Assess patient's mobility; develop plan if impaired  - Assess patient's need for assistive devices and provide as appropriate  - Encourage maximum independence but intervene and supervise when necessary  - Involve family in performance of ADLs  - Assess for home care needs following discharge   - Consider OT consult to assist with ADL evaluation and planning for discharge  - Provide patient education as appropriate  Outcome: Progressing  Goal: Maintains/Returns to pre admission functional level  Description: INTERVENTIONS:  - Perform BMAT or MOVE assessment daily    - Set and communicate daily mobility goal to care team and patient/family/caregiver  - Collaborate with rehabilitation services on mobility goals if consulted  - Reposition patient every two hours    - Record patient progress and toleration of activity level   Outcome: Progressing     Problem: DISCHARGE PLANNING  Goal: Discharge to home or other facility with appropriate resources  Description: INTERVENTIONS:  - Identify barriers to discharge w/patient and caregiver  - Arrange for needed discharge resources and transportation as appropriate  - Identify discharge learning needs (meds, wound care, etc )  - Refer to Case Management Department for coordinating discharge planning if the patient needs post-hospital services based on physician/advanced practitioner order or complex needs related to functional status, cognitive ability, or social support system  Outcome: Progressing     Problem: Knowledge Deficit  Goal: Patient/family/caregiver demonstrates understanding of disease process, treatment plan, medications, and discharge instructions  Description: Complete learning assessment and assess knowledge base    Interventions:  - Provide teaching at level of understanding  - Provide teaching via preferred learning methods  Outcome: Progressing     Problem: SKIN/TISSUE INTEGRITY - ADULT  Goal: Skin Integrity remains intact(Skin Breakdown Prevention)  Description: Assess:  -Perform Gucci assessment every shift  -Clean and moisturize skin   -Inspect skin when repositioning, toileting, and assisting with ADLS  -Assess under medical devices   -Assess extremities for adequate circulation and sensation     Bed Management:  -Have minimal linens on bed & keep smooth, unwrinkled  -Change linens as needed when moist or perspiring  -Avoid sitting or lying in one position while in bed    Toileting:  -Offer bedside commode  -Assess for incontinence   -Use incontinent care products after each incontinent episode    Activity:  -Encourage or provide ROM exercises   -Turn and reposition patient every two Hours  -Use appropriate equipment to lift or move patient in bed  -Instruct/ Assist with weight shifting when out of bed in chair  -Consider limitation of chair time     Skin Care:  -Avoid use of baby powder, tape, friction and shearing, hot water or constrictive clothing  -Relieve pressure over bony prominences  -Do not massage red bony areas    Next Steps:  -Teach patient strategies to minimize risks    -Consider consults to  interdisciplinary teams   Outcome: Progressing  Goal: Incision(s), wounds(s) or drain site(s) healing without S/S of infection  Description: INTERVENTIONS  - Assess and document dressing, incision, wound bed, drain sites and surrounding tissue  - Provide patient and family education  - Perform skin care/dressing changes per order  Outcome: Progressing  Goal: Pressure injury heals and does not worsen  Description: Interventions:  - Implement low air loss mattress or specialty surface (Criteria met)  - Apply silicone foam dressing  - Instruct/assist with weight shifting when in chair   - Limit chair time   - Use special pressure reducing interventions  when in chair   - Apply fecal or urinary incontinence containment device   - Perform passive or active ROM   - Turn and reposition patient & offload bony prominences every two hours   - Utilize friction reducing device or surface for transfers   - Consider consults to  interdisciplinary teams   - Use incontinent care products after each incontinent episode  - Consider nutrition services referral as needed  Outcome: Progressing     Problem: MOBILITY - ADULT  Goal: Maintain or return to baseline ADL function  Description: INTERVENTIONS:  -  Assess patient's ability to carry out ADLs; assess patient's baseline for ADL function and identify physical deficits which impact ability to perform ADLs (bathing, care of mouth/teeth, toileting, grooming, dressing, etc )  - Assess/evaluate cause of self-care deficits   - Assess range of motion  - Assess patient's mobility; develop plan if impaired  - Assess patient's need for assistive devices and provide as appropriate  - Encourage maximum independence but intervene and supervise when necessary  - Involve family in performance of ADLs  - Assess for home care needs following discharge   - Consider OT consult to assist with ADL evaluation and planning for discharge  - Provide patient education as appropriate  Outcome: Progressing  Goal: Maintains/Returns to pre admission functional level  Description: INTERVENTIONS:  - Perform BMAT or MOVE assessment daily    - Set and communicate daily mobility goal to care team and patient/family/caregiver     - Collaborate with rehabilitation services on mobility goals if consulted  - Reposition patient every two hours  - Record patient progress and toleration of activity level   Outcome: Progressing     Problem: Prexisting or High Potential for Compromised Skin Integrity  Goal: Skin integrity is maintained or improved  Description: INTERVENTIONS:  - Identify patients at risk for skin breakdown  - Assess and monitor skin integrity  - Assess and monitor nutrition and hydration status  - Monitor labs   - Assess for incontinence   - Turn and reposition patient  - Assist with mobility/ambulation  - Relieve pressure over bony prominences  - Avoid friction and shearing  - Provide appropriate hygiene as needed including keeping skin clean and dry  - Evaluate need for skin moisturizer/barrier cream  - Collaborate with interdisciplinary team   - Patient/family teaching  - Consider wound care consult   Outcome: Progressing     Problem: Nutrition/Hydration-ADULT  Goal: Nutrient/Hydration intake appropriate for improving, restoring or maintaining nutritional needs  Description: Monitor and assess patient's nutrition/hydration status for malnutrition  Collaborate with interdisciplinary team and initiate plan and interventions as ordered  Monitor patient's weight and dietary intake as ordered or per policy  Utilize nutrition screening tool and intervene as necessary  Determine patient's food preferences and provide high-protein, high-caloric foods as appropriate       INTERVENTIONS:  - Monitor oral intake, urinary output, labs, and treatment plans  - Assess nutrition and hydration status and recommend course of action  - Evaluate amount of meals eaten  - Assist patient with eating if necessary   - Allow adequate time for meals  - Recommend/ encourage appropriate diets, oral nutritional supplements, and vitamin/mineral supplements  - Order, calculate, and assess calorie counts as needed  - Recommend, monitor, and adjust tube feedings and TPN/PPN based on assessed needs  - Assess need for intravenous fluids  - Provide specific nutrition/hydration education as appropriate  - Include patient/family/caregiver in decisions related to nutrition  Outcome: Progressing

## 2022-06-10 LAB
ANION GAP SERPL CALCULATED.3IONS-SCNC: 8 MMOL/L (ref 4–13)
BASOPHILS # BLD AUTO: 0.05 THOUSANDS/ΜL (ref 0–0.1)
BASOPHILS NFR BLD AUTO: 1 % (ref 0–1)
BUN SERPL-MCNC: 50 MG/DL (ref 5–25)
CALCIUM SERPL-MCNC: 8.9 MG/DL (ref 8.4–10.2)
CHLORIDE SERPL-SCNC: 97 MMOL/L (ref 96–108)
CO2 SERPL-SCNC: 27 MMOL/L (ref 21–32)
CREAT SERPL-MCNC: 1.79 MG/DL (ref 0.6–1.3)
EOSINOPHIL # BLD AUTO: 0.31 THOUSAND/ΜL (ref 0–0.61)
EOSINOPHIL NFR BLD AUTO: 3 % (ref 0–6)
ERYTHROCYTE [DISTWIDTH] IN BLOOD BY AUTOMATED COUNT: 16.8 % (ref 11.6–15.1)
GFR SERPL CREATININE-BSD FRML MDRD: 28 ML/MIN/1.73SQ M
GLUCOSE SERPL-MCNC: 126 MG/DL (ref 65–140)
HCT VFR BLD AUTO: 34.3 % (ref 34.8–46.1)
HGB BLD-MCNC: 10 G/DL (ref 11.5–15.4)
IMM GRANULOCYTES # BLD AUTO: 0.03 THOUSAND/UL (ref 0–0.2)
IMM GRANULOCYTES NFR BLD AUTO: 0 % (ref 0–2)
LYMPHOCYTES # BLD AUTO: 1.13 THOUSANDS/ΜL (ref 0.6–4.47)
LYMPHOCYTES NFR BLD AUTO: 11 % (ref 14–44)
MCH RBC QN AUTO: 24.5 PG (ref 26.8–34.3)
MCHC RBC AUTO-ENTMCNC: 29.2 G/DL (ref 31.4–37.4)
MCV RBC AUTO: 84 FL (ref 82–98)
MONOCYTES # BLD AUTO: 0.72 THOUSAND/ΜL (ref 0.17–1.22)
MONOCYTES NFR BLD AUTO: 7 % (ref 4–12)
NEUTROPHILS # BLD AUTO: 7.68 THOUSANDS/ΜL (ref 1.85–7.62)
NEUTS SEG NFR BLD AUTO: 78 % (ref 43–75)
NRBC BLD AUTO-RTO: 0 /100 WBCS
PLATELET # BLD AUTO: 391 THOUSANDS/UL (ref 149–390)
PMV BLD AUTO: 9 FL (ref 8.9–12.7)
POTASSIUM SERPL-SCNC: 4.2 MMOL/L (ref 3.5–5.3)
RBC # BLD AUTO: 4.08 MILLION/UL (ref 3.81–5.12)
SODIUM 24H UR-SCNC: 20 MOL/L
SODIUM SERPL-SCNC: 132 MMOL/L (ref 135–147)
WBC # BLD AUTO: 9.92 THOUSAND/UL (ref 4.31–10.16)

## 2022-06-10 PROCEDURE — 85025 COMPLETE CBC W/AUTO DIFF WBC: CPT | Performed by: STUDENT IN AN ORGANIZED HEALTH CARE EDUCATION/TRAINING PROGRAM

## 2022-06-10 PROCEDURE — 87205 SMEAR GRAM STAIN: CPT | Performed by: INTERNAL MEDICINE

## 2022-06-10 PROCEDURE — 99223 1ST HOSP IP/OBS HIGH 75: CPT | Performed by: INTERNAL MEDICINE

## 2022-06-10 PROCEDURE — 99233 SBSQ HOSP IP/OBS HIGH 50: CPT | Performed by: NURSE PRACTITIONER

## 2022-06-10 PROCEDURE — 80048 BASIC METABOLIC PNL TOTAL CA: CPT | Performed by: STUDENT IN AN ORGANIZED HEALTH CARE EDUCATION/TRAINING PROGRAM

## 2022-06-10 RX ORDER — OXYCODONE HYDROCHLORIDE 5 MG/1
5 TABLET ORAL ONCE
Status: COMPLETED | OUTPATIENT
Start: 2022-06-10 | End: 2022-06-10

## 2022-06-10 RX ORDER — SODIUM CHLORIDE 9 MG/ML
100 INJECTION, SOLUTION INTRAVENOUS CONTINUOUS
Status: DISPENSED | OUTPATIENT
Start: 2022-06-10 | End: 2022-06-10

## 2022-06-10 RX ADMIN — LEVOTHYROXINE SODIUM 125 MCG: 25 TABLET ORAL at 08:28

## 2022-06-10 RX ADMIN — Medication 250 MG: at 08:28

## 2022-06-10 RX ADMIN — Medication 250 MG: at 17:39

## 2022-06-10 RX ADMIN — ACETAMINOPHEN 650 MG: 325 TABLET ORAL at 10:58

## 2022-06-10 RX ADMIN — ALLOPURINOL 100 MG: 100 TABLET ORAL at 08:28

## 2022-06-10 RX ADMIN — TRIAMCINOLONE ACETONIDE: 1 CREAM TOPICAL at 22:16

## 2022-06-10 RX ADMIN — SODIUM CHLORIDE 100 ML/HR: 0.9 INJECTION, SOLUTION INTRAVENOUS at 09:48

## 2022-06-10 RX ADMIN — Medication 125 MG: at 11:00

## 2022-06-10 RX ADMIN — WARFARIN SODIUM 5 MG: 5 TABLET ORAL at 17:39

## 2022-06-10 RX ADMIN — Medication 125 MG: at 05:36

## 2022-06-10 RX ADMIN — Medication 125 MG: at 23:36

## 2022-06-10 RX ADMIN — TRIAMCINOLONE ACETONIDE 1 APPLICATION: 1 CREAM TOPICAL at 08:29

## 2022-06-10 RX ADMIN — OXYCODONE HYDROCHLORIDE 5 MG: 5 TABLET ORAL at 13:39

## 2022-06-10 RX ADMIN — DULOXETINE 20 MG: 20 CAPSULE, DELAYED RELEASE ORAL at 08:28

## 2022-06-10 RX ADMIN — Medication 125 MG: at 17:39

## 2022-06-10 RX ADMIN — CEFEPIME HYDROCHLORIDE 2000 MG: 2 INJECTION, SOLUTION INTRAVENOUS at 02:12

## 2022-06-10 RX ADMIN — ACETAMINOPHEN 650 MG: 325 TABLET ORAL at 04:51

## 2022-06-10 NOTE — ASSESSMENT & PLAN NOTE
· Chronic lymphedema with panniculitis  · Treated with six days of IV Lasix to expedite diuresis  · Serial assessments

## 2022-06-10 NOTE — ASSESSMENT & PLAN NOTE
72-year-old female with morbid obesity and diastolic CHF recently hospitalized at [facility] who re-presented for recurrent cellulitis of pannus in setting of morbid obesity, chronic lymphedema, increased moisture and superficial skin breakdown  · Wound culture with pseudomonas and proteus  · Cefepime 2gm q8 completed on 6/10   · PO vanc for c diff through 06/12 for prophylaxis  · Surgery evaluated, no surgical intervention or abscess noted  · Continue aggressive skin care with minimizing friction and moisture related skin breakdown   Frequent offloading and repositioning  · Recommend that patient be evaluated outpatient by plastic surgery outpatient for chronic lymphedema, and bariatrics

## 2022-06-10 NOTE — PLAN OF CARE
Problem: Potential for Falls  Goal: Patient will remain free of falls  Description: INTERVENTIONS:  - Educate patient/family on patient safety including physical limitations  - Instruct patient to call for assistance with activity   - Consult OT/PT to assist with strengthening/mobility   - Keep Call bell within reach  - Keep bed low and locked with side rails adjusted as appropriate  - Keep care items and personal belongings within reach  - Initiate and maintain comfort rounds  - Make Fall Risk Sign visible to staff  - Offer Toileting in advance of need  - Initiate/Maintain bed alarm  - Obtain necessary fall risk management equipment:   - Apply yellow socks and bracelet for high fall risk patients  - Consider moving patient to room near nurses station  6/10/2022 1054 by Yesenia Price RN  Outcome: Progressing  6/10/2022 1054 by Yesenia Price RN  Outcome: Progressing     Problem: PAIN - ADULT  Goal: Verbalizes/displays adequate comfort level or baseline comfort level  Description: Interventions:  - Encourage patient to monitor pain and request assistance  - Assess pain using appropriate pain scale  - Administer analgesics based on type and severity of pain and evaluate response  - Implement non-pharmacological measures as appropriate and evaluate response  - Consider cultural and social influences on pain and pain management  - Notify physician/advanced practitioner if interventions unsuccessful or patient reports new pain  6/10/2022 1054 by Yesenia Price RN  Outcome: Progressing  6/10/2022 1054 by Yesenia Price RN  Outcome: Progressing     Problem: INFECTION - ADULT  Goal: Absence or prevention of progression during hospitalization  Description: INTERVENTIONS:  - Assess and monitor for signs and symptoms of infection  - Monitor lab/diagnostic results  - Monitor all insertion sites, i e  indwelling lines, tubes, and drains  - Monitor endotracheal if appropriate and nasal secretions for changes in amount and color  - [place] appropriate cooling/warming therapies per order  - Administer medications as ordered  - Instruct and encourage patient and family to use good hand hygiene technique  - Identify and instruct in appropriate isolation precautions for identified infection/condition  6/10/2022 1054 by Cara Fontenot RN  Outcome: Progressing  6/10/2022 1054 by Cara Fontenot RN  Outcome: Progressing  Goal: Absence of fever/infection during neutropenic period  Description: INTERVENTIONS:  - Monitor WBC    6/10/2022 1054 by Cara Fontenot RN  Outcome: Progressing  6/10/2022 1054 by Cara Fontenot RN  Outcome: Progressing     Problem: SAFETY ADULT  Goal: Maintain or return to baseline ADL function  Description: INTERVENTIONS:  -  Assess patient's ability to carry out ADLs; assess patient's baseline for ADL function and identify physical deficits which impact ability to perform ADLs (bathing, care of mouth/teeth, toileting, grooming, dressing, etc )  - Assess/evaluate cause of self-care deficits   - Assess range of motion  - Assess patient's mobility; develop plan if impaired  - Assess patient's need for assistive devices and provide as appropriate  - Encourage maximum independence but intervene and supervise when necessary  - Involve family in performance of ADLs  - Assess for home care needs following discharge   - Consider OT consult to assist with ADL evaluation and planning for discharge  - Provide patient education as appropriate  6/10/2022 1054 by Cara Fontenot RN  Outcome: Progressing  6/10/2022 1054 by Cara Fontenot RN  Outcome: Progressing  Goal: Maintains/Returns to pre admission functional level  Description: INTERVENTIONS:  - Perform BMAT or MOVE assessment daily    - Set and communicate daily mobility goal to care team and patient/family/caregiver  - Collaborate with rehabilitation services on mobility goals if consulted  - Reposition patient every two hours    - Record patient progress and toleration of activity level   6/10/2022 1054 by Julia Etienne RN  Outcome: Progressing  6/10/2022 1054 by Julia Etienne RN  Outcome: Progressing     Problem: DISCHARGE PLANNING  Goal: Discharge to home or other facility with appropriate resources  Description: INTERVENTIONS:  - Identify barriers to discharge w/patient and caregiver  - Arrange for needed discharge resources and transportation as appropriate  - Identify discharge learning needs (meds, wound care, etc )  - Refer to Case Management Department for coordinating discharge planning if the patient needs post-hospital services based on physician/advanced practitioner order or complex needs related to functional status, cognitive ability, or social support system  6/10/2022 1054 by Julia Etienne RN  Outcome: Progressing  6/10/2022 1054 by Julia Etienne RN  Outcome: Progressing     Problem: Knowledge Deficit  Goal: Patient/family/caregiver demonstrates understanding of disease process, treatment plan, medications, and discharge instructions  Description: Complete learning assessment and assess knowledge base    Interventions:  - Provide teaching at level of understanding  - Provide teaching via preferred learning methods  6/10/2022 1054 by Julia Etienne RN  Outcome: Progressing  6/10/2022 1054 by Julia Etienne RN  Outcome: Progressing     Problem: SKIN/TISSUE INTEGRITY - ADULT  Goal: Skin Integrity remains intact(Skin Breakdown Prevention)  Description: Assess:  -Perform Gucci assessment every shift  -Clean and moisturize skin   -Inspect skin when repositioning, toileting, and assisting with ADLS  -Assess under medical devices   -Assess extremities for adequate circulation and sensation     Bed Management:  -Have minimal linens on bed & keep smooth, unwrinkled  -Change linens as needed when moist or perspiring  -Avoid sitting or lying in one position while in bed    Toileting:  -Offer bedside commode  -Assess for incontinence   -Use incontinent care products after each incontinent episode    Activity:  -Encourage or provide ROM exercises   -Turn and reposition patient every two Hours  -Use appropriate equipment to lift or move patient in bed  -Instruct/ Assist with weight shifting when out of bed in chair  -Consider limitation of chair time     Skin Care:  -Avoid use of baby powder, tape, friction and shearing, hot water or constrictive clothing  -Relieve pressure over bony prominences  -Do not massage red bony areas    Next Steps:  -Teach patient strategies to minimize risks    -Consider consults to  interdisciplinary teams   6/10/2022 1054 by Manuel Holt RN  Outcome: Progressing  6/10/2022 1054 by Manuel Holt RN  Outcome: Progressing  Goal: Incision(s), wounds(s) or drain site(s) healing without S/S of infection  Description: INTERVENTIONS  - Assess and document dressing, incision, wound bed, drain sites and surrounding tissue  - Provide patient and family education  - Perform skin care/dressing changes per order  6/10/2022 1054 by Manuel Holt RN  Outcome: Progressing  6/10/2022 1054 by Manuel Holt RN  Outcome: Progressing  Goal: Pressure injury heals and does not worsen  Description: Interventions:  - Implement low air loss mattress or specialty surface (Criteria met)  - Apply silicone foam dressing  - Instruct/assist with weight shifting when in chair   - Limit chair time   - Use special pressure reducing interventions  when in chair   - Apply fecal or urinary incontinence containment device   - Perform passive or active ROM   - Turn and reposition patient & offload bony prominences every two hours   - Utilize friction reducing device or surface for transfers   - Consider consults to  interdisciplinary teams   - Use incontinent care products after each incontinent episode  - Consider nutrition services referral as needed  6/10/2022 1054 by Manuel Holt RN  Outcome: Progressing  6/10/2022 1054 by Karyn Ian, RN  Outcome: Progressing     Problem: MOBILITY - ADULT  Goal: Maintain or return to baseline ADL function  Description: INTERVENTIONS:  -  Assess patient's ability to carry out ADLs; assess patient's baseline for ADL function and identify physical deficits which impact ability to perform ADLs (bathing, care of mouth/teeth, toileting, grooming, dressing, etc )  - Assess/evaluate cause of self-care deficits   - Assess range of motion  - Assess patient's mobility; develop plan if impaired  - Assess patient's need for assistive devices and provide as appropriate  - Encourage maximum independence but intervene and supervise when necessary  - Involve family in performance of ADLs  - Assess for home care needs following discharge   - Consider OT consult to assist with ADL evaluation and planning for discharge  - Provide patient education as appropriate  6/10/2022 1054 by Karyn Sosa RN  Outcome: Progressing  6/10/2022 1054 by Karyn Sosa RN  Outcome: Progressing  Goal: Maintains/Returns to pre admission functional level  Description: INTERVENTIONS:  - Perform BMAT or MOVE assessment daily    - Set and communicate daily mobility goal to care team and patient/family/caregiver  - Collaborate with rehabilitation services on mobility goals if consulted  - Reposition patient every two hours    - Record patient progress and toleration of activity level   6/10/2022 1054 by Karyn Sosa RN  Outcome: Progressing  6/10/2022 1054 by Karyn Sosa RN  Outcome: Progressing     Problem: Prexisting or High Potential for Compromised Skin Integrity  Goal: Skin integrity is maintained or improved  Description: INTERVENTIONS:  - Identify patients at risk for skin breakdown  - Assess and monitor skin integrity  - Assess and monitor nutrition and hydration status  - Monitor labs   - Assess for incontinence   - Turn and reposition patient  - Assist with mobility/ambulation  - Relieve pressure over bony prominences  - Avoid friction and shearing  - Provide appropriate hygiene as needed including keeping skin clean and dry  - Evaluate need for skin moisturizer/barrier cream  - Collaborate with interdisciplinary team   - Patient/family teaching  - Consider wound care consult   6/10/2022 1054 by Fiona Garcia RN  Outcome: Progressing  6/10/2022 1054 by Fiona Garcia RN  Outcome: Progressing     Problem: Nutrition/Hydration-ADULT  Goal: Nutrient/Hydration intake appropriate for improving, restoring or maintaining nutritional needs  Description: Monitor and assess patient's nutrition/hydration status for malnutrition  Collaborate with interdisciplinary team and initiate plan and interventions as ordered  Monitor patient's weight and dietary intake as ordered or per policy  Utilize nutrition screening tool and intervene as necessary  Determine patient's food preferences and provide high-protein, high-caloric foods as appropriate       INTERVENTIONS:  - Monitor oral intake, urinary output, labs, and treatment plans  - Assess nutrition and hydration status and recommend course of action  - Evaluate amount of meals eaten  - Assist patient with eating if necessary   - Allow adequate time for meals  - Recommend/ encourage appropriate diets, oral nutritional supplements, and vitamin/mineral supplements  - Order, calculate, and assess calorie counts as needed  - Recommend, monitor, and adjust tube feedings and TPN/PPN based on assessed needs  - Assess need for intravenous fluids  - Provide specific nutrition/hydration education as appropriate  - Include patient/family/caregiver in decisions related to nutrition  6/10/2022 1054 by Fiona Garcia RN  Outcome: Progressing  6/10/2022 1054 by Fiona Gacria RN  Outcome: Progressing

## 2022-06-10 NOTE — CONSULTS
Consultation - Nephrology   Joshua Garcia 80 y o  female MRN: [id number]  Unit/Bed#: -01 Encounter: [id number]      A/P:  1  Acute kidney injury on top of chronic kidney disease   Etiology most likely due to prerenal causes  Agree with volume expansion at this time  He may be a component of underlying acute tubular necrosis, urine studies currently pending  Continue avoid potential nephrotoxins worked optimize the patient's overall hemodynamics  At this time potential for acute interstitial nephritis low  Will however check the patient's complements to rule out a postinfectious glomerular nephritis given mild proteinuria and hematuria, although this too quite low on the differential at this time  2  Chronic kidney disease stage 2 with baseline creatinine between 1-1 1 mg/dL  3  Hypovolemic hyponatremia   Continue normal saline IV fluid volume expansion, patient may require an additional L of IV fluid, need to continue closely monitor overall hemodynamics  4  Volume depletion   Continue with volume expansion, follow-up urine studies  Continue to closely monitor the patient's overall clinical status  5  Panniculitis   Continue with cefepime, patient noted to have Pseudomonas aeruginosa as well as Proteus mirabilis  These are susceptible to cefepime, continue care and management according to our infectious Disease and hospitalist colleagues  6  Severe morbid obesity with BMI of 82   7  Chronic diastolic congestive heart failure   Patient is compensated this time, as mentioned above patient likely volume deplete in agree with volume expansion  Thank you for allowing us to participate in the care of your patient  Please feel free to contact us regarding the care of this patient, or any other questions/concerns that may be applicable      Patient Active Problem List   Diagnosis    Other specified hypothyroidism    Mild episode of recurrent major depressive disorder ([facility] [address] )    Idiopathic chronic 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    Primary osteoarthritis involving multiple joints    Class 3 severe obesity due to excess calories with serious comorbidity and body mass index (BMI) greater than or equal to 70 in adult [facility])    Chronic diastolic congestive heart failure (HCC)    Panniculitis    Gastroesophageal reflux disease without esophagitis    Lymphedema of extremity    Adjustment disorder    Ambulatory dysfunction    Anemia    Carpal tunnel syndrome    Acute kidney injury superimposed on CKD ([facility] [address] )    Depression with anxiety    Gout    Hx MRSA infection    Hyperlipidemia    Irritable bowel syndrome    Left atrial enlargement    Left ovarian cyst    Left ventricular hypertrophy    Osteoarthritis    Symptomatic menopausal or female climacteric states    C  difficile colitis    History of Clostridioides difficile infection    Chronic atrial fibrillation (HCC)    Positive blood culture    BMI 70 and over, adult ([facility] )    Abdominal wall cellulitis       History of Present Illness   Physician Requesting Consult: Bryan Rincon MD  Reason for Consult / Principal Problem:  Acute kidney injury   Hx and PE limited by:   HPI: Zena Kennedy is a 80y o  year old female who presented to the emergency room on May 27th due to worsening redness in the abdominal region  Patient had an episode of panniculitis and was status post a 2 week treatment with the IV antibiotic prior to her presentation back to the hospital   Patient's claims that she also had episodes of loose bowel movements prior to presentation, this had improved at the time  Patient was initially placed back on vancomycin, however this was then switched over to cefepime after she cultured out a Pseudomonas bacteria      From the renal standpoint, patient appears to have a baseline creatinine between 1-1 1 mg/dL, she presented with a creatinine of 1 02 mg/dL and kidney function remained fairly stable up until June 2nd at which time creatinine drifted up between 1 1-1 3 mg/dL and then on June 6th increased to 1 35 and slowly continued to increase up to 1 56 on June 9th and this morning it was 1 79 mg/dL  Outside of a mild hyponatremia 132 millimole/L, there are no other significant electrolyte abnormalities  Patient's blood glucose levels have been stable  Although the patient's blood pressures have been borderline low, they have been consistently over 618 mmHg systolic  At presentation, the patient was given furosemide 40 mg IV up until June 6th at which time was discontinued  Although the patient had CT scan, there was no exposure to contrast since presentation  No use of nonsteroidal anti-inflammatory medications  Previous electronic medical records reviewed in the course this consultation  The patient was discharged previously on May 15th due to Staphylococcus epidermidis bacteremia, which time she was prescribed intravenous vancomycin  History obtained from chart review and the patient    Constitutional ROS- Denies fatigue, fever, chills, night sweats, weight changes  HEENT ROS- Denies history of eye surgeries, glaucoma, headaches or history of trauma, blurred vision     Endocrine ROS- No history diabetes mellitus or thyroid disease  Cardiovascular ROS- Denies chest pain, palpitation, dyspnea exertion, orthopnea, claudication  Pulmonary ROS- Denies history of COPD, asthma  Denies cough, hemoptysis, shortness of breath  GI ROS- Denies abdominal pain, diarrhea, nausea, swallowing problems, vomiting, constipation, blood in stools, fecal incontinence  Hematological ROS- Denies history of easy bruising, blood clots, bleeding or blood transfusions  Genitourinary ROS- Denies recent hematuria, pyuria, flank pain, change in urinary stream, decreased urinary output, increased urinary frequency, nocturia, foamy urine, or urinary incontinence    Lymphatic ROS- Denies lymphadenopathy  Musculoskeletal ROS- Denies history of muscle weakness, joint pain  Dermatological ROS- Denies rash, wounds, ulcers, itching, jaundice  Psychiatric ROS- Denies anxiety, depression, hallucinations, disorientation  Neurological ROS- No stroke or TIA symptoms        Historical Information   Past Medical History:   Diagnosis Date    Atrial fibrillation ([place] [address] )     Bladder stone     C  difficile colitis     Cellulitis     CHF (congestive heart failure) ([place] [address] )     Depression with anxiety     Disease of thyroid gland     Diverticulitis     Enterocolitis     History of abnormal cervical Pap smear     Kidney stone     Lymphedema     Menopause     Age 53    Morbid obesity with BMI of 70 and over, adult ([place] [address] )     MRSA (methicillin resistant Staphylococcus aureus)     abdominal wound    Osteoarthritis     Phlebitis     left lower leg    Spondylolysis      Past Surgical History:   Procedure Laterality Date    APPENDECTOMY      CARPAL TUNNEL RELEASE      CHOLECYSTECTOMY      CYSTOSCOPY      stent    FOOT SURGERY  1982    bone spur    KNEE SURGERY      WISDOM TOOTH EXTRACTION       Social History   Social History     Substance and Sexual Activity   Alcohol Use Not Currently     Social History     Substance and Sexual Activity   Drug Use Not Currently    Comment: As a teen - As per [employer]      Social History     Tobacco Use   Smoking Status Former Smoker   Smokeless Tobacco Never Used   Tobacco Comment    5 packs/day until 6 (age 16-20) - As per [employer]      Family History   Problem Relation Age of Onset    Heart failure Mother     Heart disease Mother     Lymphoma Mother     Kidney disease Father     Heart disease Father     Heart failure Father     Diabetes type II Father     Diabetes type II Sister     Diabetes type II Brother     Uterine cancer Paternal Aunt     Breast cancer Neg Hx     Colon cancer Neg Hx     Ovarian cancer Neg Hx        Meds/Allergies   all current active meds have been reviewed, current meds:   Current Facility-Administered Medications   Medication Dose Route Frequency    acetaminophen (TYLENOL) tablet 650 mg  650 mg Oral Q4H PRN    allopurinol (ZYLOPRIM) tablet 100 mg  100 mg Oral Daily    DULoxetine (CYMBALTA) delayed release capsule 20 mg  20 mg Oral Daily    levothyroxine tablet 125 mcg  125 mcg Oral Daily    ondansetron (ZOFRAN) injection 4 mg  4 mg Intravenous Q6H PRN    saccharomyces boulardii (FLORASTOR) capsule 250 mg  250 mg Oral BID    sodium chloride 0 9 % infusion  100 mL/hr Intravenous Continuous    triamcinolone (KENALOG) 0 1 % cream   Topical BID    vancomycin (VANCOCIN) oral solution 125 mg  125 mg Oral Q6H [facility]    warfarin (COUMADIN) tablet 5 mg  5 mg Oral Every Other Day    And    warfarin (COUMADIN) tablet 2 mg  2 mg Oral Every Other Day    and PTA meds:    Medications Prior to Admission   Medication    acetaminophen (TYLENOL) 650 mg CR tablet    allopurinol (ZYLOPRIM) 100 mg tablet    DULoxetine (CYMBALTA) 20 mg capsule    furosemide (LASIX) 40 mg tablet    levothyroxine 125 mcg tablet    triamcinolone (KENALOG) 0 1 % cream    warfarin (COUMADIN) 2 mg tablet    warfarin (COUMADIN) 5 mg tablet         Allergies   Allergen Reactions    Augmentin Es-600  [Amoxicillin-Pot Clavulanate]     Penicillins Other (See Comments)     Tolerates cefepime (11/18/21)    Sulfa Antibiotics Hives    Vitamin C [Ascorbate - Food Allergy]     Latex Rash and Edema       Objective     Intake/Output Summary (Last 24 hours) at 6/10/2022 1411  Last data filed at 6/10/2022 1249  Gross per 24 hour   Intake 1480 ml   Output --   Net 1480 ml       Invasive Devices:        Physical Exam      I/O last 3 completed shifts: In: 1480 [P O :480; I V :1000]  Out: -     Vitals:    06/10/22 0716   BP: 114/73   Pulse: 63   Resp: 18   Temp: 97 5 °F (36 4 °C)   SpO2: 98%       General Appearance:    No acute distress  Cooperative  Appears stated age  Head:    Normocephalic  Atraumatic  Normal jaw occlusion  Eyes:    Lids, conjunctiva normal  No scleral icterus  Ears:    Normal external ears  Nose:   Nares normal  No drainage  Mouth:   Lips, tongue normal  Mucosa normal  Phonation normal    Neck:   Supple  Symmetrical    Back:     Symmetric  No CVA tenderness  Lungs:     Normal respiratory effort  Clear to auscultation bilaterally  Chest wall:    No tenderness or deformity  Heart:    Regular rate and rhythm  Normal S1 and S2  No murmur  No JVD  No edema  Abdomen:     Soft  Non-tender  Bowel sounds active  Genitourinary:   No Trinidad catheter present  Extremities:   Extremities normal  Atraumatic  No cyanosis  Skin:   Warm and dry  No pallor, jaundice, rash, ecchymoses  Neurologic:   Alert and oriented to person, place, time  No focal deficit  Current Weight: Weight - Scale: (!) 205 kg (451 lb 4 5 oz)  First Weight: Weight - Scale: (!) 212 kg (467 lb)    Lab Results:  I have personally reviewed pertinent labs      CBC:   Lab Results   Component Value Date    WBC 9 92 06/10/2022    HGB 10 0 (L) 06/10/2022    HCT 34 3 (L) 06/10/2022    MCV 84 06/10/2022     (H) 06/10/2022    MCH 24 5 (L) 06/10/2022    MCHC 29 2 (L) 06/10/2022    RDW 16 8 (H) 06/10/2022    MPV 9 0 06/10/2022    NRBC 0 06/10/2022     CMP:   Lab Results   Component Value Date    K 4 2 06/10/2022    CL 97 06/10/2022    CO2 27 06/10/2022    BUN 50 (H) 06/10/2022    CREATININE 1 79 (H) 06/10/2022    CALCIUM 8 9 06/10/2022    EGFR 28 06/10/2022     Phosphorus: No results found for: PHOS  Magnesium: No results found for: MG  Urinalysis: No results found for: COLORU, CLARITYU, SPECGRAV, PHUR, LEUKOCYTESUR, NITRITE, PROTEINUA, GLUCOSEU, KETONESU, BILIRUBINUR, BLOODU  Ionized Calcium: No results found for: CAION  Coagulation: No results found for: PT, INR, APTT  Troponin: No results found for: TROPONINI  ABG: No results found for: PHART, QTB5RQN, PO2ART, ZNM5OMT, E8VZXSIC, SIMA, SOURCE    Results from last 7 days   Lab Units 06/10/22  0431 06/09/22  0438 06/08/22  0507 06/06/22  0510 06/05/22  0427   POTASSIUM mmol/L 4 2 3 9 3 5   < > 3 4*   CHLORIDE mmol/L 97 96 96   < > 95*   CO2 mmol/L 27 27 30   < > 31   BUN mg/dL 50* 47* 43*   < > 34*   CREATININE mg/dL 1 79* 1 56* 1 46*   < > 1 29   CALCIUM mg/dL 8 9 9 1 9 1   < > 9 0   ALK PHOS U/L  --   --   --   --  91   ALT U/L  --   --   --   --  14   AST U/L  --   --   --   --  19    < > = values in this interval not displayed  Radiology review:  No results found  Wolf Price, DO      This consultation note was produced in part using a dictation device which may document imprecise wording from author's original intent

## 2022-06-10 NOTE — PROGRESS NOTES
[facility]  Progress Note - Veronika Shetty 1950, 80 y o  female MRN: [id number]  Unit/Bed#: -01 Encounter: [id number]  Primary Care Provider: ORACIO Ovalles   Date and time admitted to hospital: 5/27/2022  7:52 PM    * Panniculitis  Assessment & Plan  71-year-old female with morbid obesity and diastolic CHF recently hospitalized at [facility] [place] who re-presented for recurrent cellulitis of pannus in setting of morbid obesity, chronic lymphedema, increased moisture and superficial skin breakdown  · Wound culture with pseudomonas and proteus  · Cefepime 2gm q8 completed on 6/10  · PO vanc for c diff until 06/12 for prophylaxis  · Surgery evaluated, no surgical intervention or abscess noted  · Continue aggressive skin care with minimizing friction and moisture related skin breakdown  Frequent offloading and repositioning  · Recommend that patient be evaluated outpatient by plastic surgery outpatient for chronic lymphedema, and bariatrics      History of Clostridioides difficile infection  Assessment & Plan  · Continue prophylactic oral vancomycin for 72 hours beyond systemic antibiotics (end 6/12)  · Continue probiotics    BMI 70 and over, adult [facility])  Assessment & Plan  · Continue supportive care  · Risk factor for recurrent infections    Positive blood culture  Assessment & Plan  · Positive blood culture may be contaminant as is coag negative staph  · Does have history of MRSE bacteremia  · Appreciate infectious disease evaluation    · Low suspicion for true bacteremia and endovascular infection  · Completed vancomycin until 06/05    Chronic atrial fibrillation (HCC)  Assessment & Plan  · Not on BB  · INR therapeutic, continue coumadin    Gout  Assessment & Plan  · Continue allopurinol    Depression with anxiety  Assessment & Plan  · Mood stable   · Continue duloxetine    Acute kidney injury superimposed on CKD [facility])  Assessment & Plan  · Creatininine continue to be elevated, possibly due to overdiuresis  · Check urine studies, NA, OSM, Urea and Creatinine  · Held lasix, last given on   · Will give another 1L NS over 10 hours  · Nephrology consult    Lymphedema of extremity  Assessment & Plan  · Chronic lymphedema with panniculitis  · Treated with six days of IV Lasix to expedite diuresis  · Serial assessments    Chronic diastolic congestive heart failure (HCC)  Assessment & Plan  Wt Readings from Last 3 Encounters:   06/10/22 (!) 205 kg (451 lb 4 5 oz)   22 (!) 212 kg (467 lb)   22 (!) 193 kg (425 lb)     · Diuresed for lower extremity swelling with IV Lasix  · Monitor BMP, diuretics on hold at this time  · Continue fluid restriction, low-sodium diet    Class 3 severe obesity due to excess calories with serious comorbidity and body mass index (BMI) greater than or equal to 70 in [facility])  Assessment & Plan  · Body mass index is 82 54 kg/m²  · Risk factor for recurrent infection and failure of oral antibiotics  · Continue supportive care and recommend outpatient evaluation    Other specified hypothyroidism  Assessment & Plan  · Continue levothyroxine    VTE Pharmacologic Prophylaxis:   Pharmacologic: Warfarin (Coumadin)  Mechanical VTE Prophylaxis in Place: Yes    Patient Centered Rounds: I have performed bedside rounds with nursing staff today  Education and Discussions with Family / Patient: Patient    Time Spent for Care: 30 minutes  More than 50% of total time spent on counseling and coordination of care as described above  Current Length of Stay: 14 day(s)    Current Patient Status: Inpatient   Certification Statement: The patient will continue to require additional inpatient hospital stay due to LARRY    Discharge Plan: TBD, maybe Monday, home with [facility]    Code Status: Level 1 - Full Code      Subjective:   Patient seen and examined  No new complaints       Objective:     Vitals:   Temp (24hrs), Av 8 °F (36 6 °C), Min:97 5 °F (36 4 °C), Max:98 °F (36 7 °C)    Temp:  [97 5 °F (36 4 °C)-98 °F (36 7 °C)] 97 5 °F (36 4 °C)  HR:  [62-77] 63  Resp:  [18] 18  BP: (102-114)/(52-73) 114/73  SpO2:  [92 %-98 %] 98 %  Body mass index is 82 54 kg/m²  Input and Output Summary (last 24 hours): Intake/Output Summary (Last 24 hours) at 6/10/2022 0933  Last data filed at 6/9/2022 2013  Gross per 24 hour   Intake 1360 ml   Output --   Net 1360 ml       Physical Exam:     Physical Exam  Vitals and nursing note reviewed  Constitutional:       General: She is not in acute distress  Appearance: She is obese  HENT:      Head: Normocephalic and atraumatic  Mouth/Throat:      Mouth: Mucous membranes are moist       Pharynx: Oropharynx is clear  Eyes:      Pupils: Pupils are equal, round, and reactive to light  Cardiovascular:      Rate and Rhythm: Normal rate  Rhythm irregular  Pulmonary:      Effort: Pulmonary effort is normal  No respiratory distress  Breath sounds: Normal breath sounds  Abdominal:      General: Bowel sounds are normal       Palpations: Abdomen is soft  Tenderness: There is no abdominal tenderness  Musculoskeletal:      Cervical back: Neck supple  Right lower leg: Edema present  Left lower leg: Edema present  Skin:     General: Skin is warm and dry  Capillary Refill: Capillary refill takes less than 2 seconds  Comments: Left pannus erythema and drainage improved from prior assessment last contact of 5/29/2022   Neurological:      General: No focal deficit present  Mental Status: She is alert         Additional Data:     Labs:    Results from last 7 days   Lab Units 06/10/22  0431   WBC Thousand/uL 9 92   HEMOGLOBIN g/dL 10 0*   HEMATOCRIT % 34 3*   PLATELETS Thousands/uL 391*   NEUTROS PCT % 78*   LYMPHS PCT % 11*   MONOS PCT % 7   EOS PCT % 3     Results from last 7 days   Lab Units 06/10/22  0431 06/06/22  0510 06/05/22  0427   SODIUM mmol/L 132*   < > 133*   POTASSIUM mmol/L 4 2   < > 3 4* CHLORIDE mmol/L 97   < > 95*   CO2 mmol/L 27   < > 31   BUN mg/dL 50*   < > 34*   CREATININE mg/dL 1 79*   < > 1 29   ANION GAP mmol/L 8   < > 7   CALCIUM mg/dL 8 9   < > 9 0   ALBUMIN g/dL  --   --  3 0*   TOTAL BILIRUBIN mg/dL  --   --  0 73   ALK PHOS U/L  --   --  91   ALT U/L  --   --  14   AST U/L  --   --  19   GLUCOSE RANDOM mg/dL 126   < > 130    < > = values in this interval not displayed  Results from last 7 days   Lab Units 06/09/22  0438   INR  2 84*                       * I Have Reviewed All Lab Data Listed Above  * Additional Pertinent Lab Tests Reviewed: [facility] Admission Reviewed          Last 24 Hours Medication List:   Current Facility-Administered Medications   Medication Dose Route Frequency Provider Last Rate    acetaminophen  650 mg Oral Q4H PRN Carlos Lam PA-C      allopurinol  100 mg Oral Daily [place], [place]      DULoxetine  20 mg Oral Daily [place], [place]      levothyroxine  125 mcg Oral Daily [place], [place]      ondansetron  4 mg Intravenous Q6H PRN Carlos Lam PA-C      saccharomyces boulardii  250 mg Oral BID [place], [place]      sodium chloride  100 mL/hr Intravenous Continuous ORACIO Arredondo      triamcinolone   Topical BID [place], [place]      vancomycin  125 mg Oral Q6H [address] Fabiokwame Cisneros MD      warfarin  5 mg Oral Every Other Day Darell Bell MD      And    warfarin  2 mg Oral Every Other Ru Silva MD          Today, Patient Was Seen By: ORACIO Arredondo    ** Please Note: Dictation voice to text software may have been used in the creation of this document   **

## 2022-06-10 NOTE — ASSESSMENT & PLAN NOTE
· Continue prophylactic oral vancomycin for 72 hours beyond systemic antibiotics (end 6/12)  · Continue probiotics

## 2022-06-10 NOTE — ASSESSMENT & PLAN NOTE
· Body mass index is 82 54 kg/m²     · Risk factor for recurrent infection and failure of oral antibiotics  · Continue supportive care and recommend outpatient evaluation

## 2022-06-10 NOTE — ASSESSMENT & PLAN NOTE
· Creatininine continue to be elevated, possibly due to overdiuresis  · Check urine studies, NA, OSM, Urea and Creatinine  · Held lasix, last given on 06/06  · Will give another 1L NS over 10 hours  · Nephrology consult

## 2022-06-10 NOTE — ASSESSMENT & PLAN NOTE
Wt Readings from Last 3 Encounters:   06/10/22 (!) 205 kg (451 lb 4 5 oz)   05/08/22 (!) 212 kg (467 lb)   05/06/22 (!) 193 kg (425 lb)     · Diuresed for lower extremity swelling with IV Lasix  · Monitor BMP, diuretics on hold at this time  · Continue fluid restriction, low-sodium diet

## 2022-06-11 ENCOUNTER — APPOINTMENT (INPATIENT)
Dept: RADIOLOGY | Facility: HOSPITAL | Age: 72
DRG: 603 | End: 2022-06-11
Payer: MEDICARE

## 2022-06-11 PROBLEM — M25.512 ACUTE PAIN OF LEFT SHOULDER: Status: ACTIVE | Noted: 2022-06-11

## 2022-06-11 LAB
ANION GAP SERPL CALCULATED.3IONS-SCNC: 9 MMOL/L (ref 4–13)
BASOPHILS # BLD AUTO: 0.07 THOUSANDS/ΜL (ref 0–0.1)
BASOPHILS NFR BLD AUTO: 1 % (ref 0–1)
BUN SERPL-MCNC: 58 MG/DL (ref 5–25)
C3 SERPL-MCNC: 125 MG/DL (ref 90–180)
C4 SERPL-MCNC: 27 MG/DL (ref 10–40)
CALCIUM SERPL-MCNC: 8.5 MG/DL (ref 8.4–10.2)
CHLORIDE SERPL-SCNC: 97 MMOL/L (ref 96–108)
CO2 SERPL-SCNC: 27 MMOL/L (ref 21–32)
CREAT SERPL-MCNC: 2.07 MG/DL (ref 0.6–1.3)
EOSINOPHIL # BLD AUTO: 0.29 THOUSAND/ΜL (ref 0–0.61)
EOSINOPHIL NFR BLD AUTO: 3 % (ref 0–6)
EOSINOPHIL NFR URNS MANUAL: 0 %
ERYTHROCYTE [DISTWIDTH] IN BLOOD BY AUTOMATED COUNT: 16.8 % (ref 11.6–15.1)
GFR SERPL CREATININE-BSD FRML MDRD: 23 ML/MIN/1.73SQ M
GLUCOSE SERPL-MCNC: 99 MG/DL (ref 65–140)
HCT VFR BLD AUTO: 33.5 % (ref 34.8–46.1)
HGB BLD-MCNC: 9.9 G/DL (ref 11.5–15.4)
IMM GRANULOCYTES # BLD AUTO: 0.03 THOUSAND/UL (ref 0–0.2)
IMM GRANULOCYTES NFR BLD AUTO: 0 % (ref 0–2)
INR PPP: 3.11 (ref 0.84–1.19)
LYMPHOCYTES # BLD AUTO: 1.15 THOUSANDS/ΜL (ref 0.6–4.47)
LYMPHOCYTES NFR BLD AUTO: 11 % (ref 14–44)
MCH RBC QN AUTO: 24.5 PG (ref 26.8–34.3)
MCHC RBC AUTO-ENTMCNC: 29.6 G/DL (ref 31.4–37.4)
MCV RBC AUTO: 83 FL (ref 82–98)
MONOCYTES # BLD AUTO: 0.69 THOUSAND/ΜL (ref 0.17–1.22)
MONOCYTES NFR BLD AUTO: 7 % (ref 4–12)
NEUTROPHILS # BLD AUTO: 8.09 THOUSANDS/ΜL (ref 1.85–7.62)
NEUTS SEG NFR BLD AUTO: 78 % (ref 43–75)
NRBC BLD AUTO-RTO: 0 /100 WBCS
PLATELET # BLD AUTO: 377 THOUSANDS/UL (ref 149–390)
PMV BLD AUTO: 8.7 FL (ref 8.9–12.7)
POTASSIUM SERPL-SCNC: 4.4 MMOL/L (ref 3.5–5.3)
PROTHROMBIN TIME: 31.2 SECONDS (ref 11.6–14.5)
RBC # BLD AUTO: 4.04 MILLION/UL (ref 3.81–5.12)
SODIUM SERPL-SCNC: 133 MMOL/L (ref 135–147)
WBC # BLD AUTO: 10.32 THOUSAND/UL (ref 4.31–10.16)

## 2022-06-11 PROCEDURE — 85610 PROTHROMBIN TIME: CPT | Performed by: NURSE PRACTITIONER

## 2022-06-11 PROCEDURE — 99233 SBSQ HOSP IP/OBS HIGH 50: CPT | Performed by: NURSE PRACTITIONER

## 2022-06-11 PROCEDURE — 99232 SBSQ HOSP IP/OBS MODERATE 35: CPT | Performed by: INTERNAL MEDICINE

## 2022-06-11 PROCEDURE — 73030 X-RAY EXAM OF SHOULDER: CPT

## 2022-06-11 PROCEDURE — 86160 COMPLEMENT ANTIGEN: CPT | Performed by: INTERNAL MEDICINE

## 2022-06-11 PROCEDURE — 85025 COMPLETE CBC W/AUTO DIFF WBC: CPT | Performed by: NURSE PRACTITIONER

## 2022-06-11 PROCEDURE — 80048 BASIC METABOLIC PNL TOTAL CA: CPT | Performed by: NURSE PRACTITIONER

## 2022-06-11 RX ORDER — OXYCODONE HYDROCHLORIDE 5 MG/1
5 TABLET ORAL EVERY 6 HOURS PRN
Status: DISCONTINUED | OUTPATIENT
Start: 2022-06-11 | End: 2022-06-14 | Stop reason: HOSPADM

## 2022-06-11 RX ORDER — WARFARIN SODIUM 5 MG/1
5 TABLET ORAL EVERY OTHER DAY
Status: DISCONTINUED | OUTPATIENT
Start: 2022-06-12 | End: 2022-06-12

## 2022-06-11 RX ORDER — OXYCODONE HYDROCHLORIDE 5 MG/1
5 TABLET ORAL ONCE
Status: COMPLETED | OUTPATIENT
Start: 2022-06-11 | End: 2022-06-11

## 2022-06-11 RX ORDER — WARFARIN SODIUM 2 MG/1
2 TABLET ORAL EVERY OTHER DAY
Status: DISCONTINUED | OUTPATIENT
Start: 2022-06-13 | End: 2022-06-12

## 2022-06-11 RX ORDER — SODIUM CHLORIDE, SODIUM GLUCONATE, SODIUM ACETATE, POTASSIUM CHLORIDE, MAGNESIUM CHLORIDE, SODIUM PHOSPHATE, DIBASIC, AND POTASSIUM PHOSPHATE .53; .5; .37; .037; .03; .012; .00082 G/100ML; G/100ML; G/100ML; G/100ML; G/100ML; G/100ML; G/100ML
100 INJECTION, SOLUTION INTRAVENOUS ONCE
Status: COMPLETED | OUTPATIENT
Start: 2022-06-11 | End: 2022-06-11

## 2022-06-11 RX ORDER — SODIUM CHLORIDE 9 MG/ML
100 INJECTION, SOLUTION INTRAVENOUS CONTINUOUS
Status: DISCONTINUED | OUTPATIENT
Start: 2022-06-11 | End: 2022-06-11

## 2022-06-11 RX ADMIN — Medication 125 MG: at 12:07

## 2022-06-11 RX ADMIN — ALLOPURINOL 100 MG: 100 TABLET ORAL at 08:44

## 2022-06-11 RX ADMIN — LEVOTHYROXINE SODIUM 125 MCG: 25 TABLET ORAL at 08:44

## 2022-06-11 RX ADMIN — Medication 125 MG: at 17:46

## 2022-06-11 RX ADMIN — TRIAMCINOLONE ACETONIDE 1 APPLICATION: 1 CREAM TOPICAL at 08:44

## 2022-06-11 RX ADMIN — Medication 125 MG: at 23:55

## 2022-06-11 RX ADMIN — Medication 250 MG: at 08:44

## 2022-06-11 RX ADMIN — Medication 125 MG: at 08:44

## 2022-06-11 RX ADMIN — Medication 250 MG: at 17:46

## 2022-06-11 RX ADMIN — DULOXETINE 20 MG: 20 CAPSULE, DELAYED RELEASE ORAL at 08:44

## 2022-06-11 RX ADMIN — SODIUM CHLORIDE, SODIUM GLUCONATE, SODIUM ACETATE, POTASSIUM CHLORIDE, MAGNESIUM CHLORIDE, SODIUM PHOSPHATE, DIBASIC, AND POTASSIUM PHOSPHATE 100 ML/HR: .53; .5; .37; .037; .03; .012; .00082 INJECTION, SOLUTION INTRAVENOUS at 12:10

## 2022-06-11 RX ADMIN — OXYCODONE HYDROCHLORIDE 5 MG: 5 TABLET ORAL at 05:59

## 2022-06-11 NOTE — ASSESSMENT & PLAN NOTE
67-year-old female with morbid obesity and diastolic CHF recently hospitalized at [facility] who re-presented for recurrent cellulitis of pannus in setting of morbid obesity, chronic lymphedema, increased moisture and superficial skin breakdown  · Wound culture with pseudomonas and proteus  · Cefepime 2gm q8 completed on 6/10   · PO vanc for c diff through 06/12 for prophylaxis  · Surgery evaluated, no surgical intervention or abscess noted  · Continue aggressive skin care with minimizing friction and moisture related skin breakdown  Frequent offloading and repositioning  · Recommend that patient be evaluated outpatient by plastic surgery outpatient for chronic lymphedema, and bariatrics  · Patient's home caretaker out sick until Monday    Agency unable to find replacement

## 2022-06-11 NOTE — CASE MANAGEMENT
Case Management Discharge Planning Note    Patient name Rhona Case  Location [address] [id number] MRN [id number]  : 1950 Date 6/10/2022       Current Admission Date: 2022  Current Admission Diagnosis:Panniculitis   Patient Active Problem List    Diagnosis Date Noted    Abdominal wall cellulitis     BMI 70 and over, adult ([place] [address] ) 2022    Positive blood culture 2022    Chronic atrial fibrillation ([place] [address] ) 2022    C  difficile colitis 2022    History of Clostridioides difficile infection 2022    Lymphedema of extremity 2021    Other specified hypothyroidism 10/03/2020    Mild episode of recurrent major depressive disorder ([place] [address] ) 10/03/2020    Idiopathic chronic 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 10/03/2020    Primary osteoarthritis involving multiple joints 10/03/2020    Class 3 severe obesity due to excess calories with serious comorbidity and body mass index (BMI) greater than or equal to 70 in adult [facility]) 10/03/2020    Chronic diastolic congestive heart failure ([place] [address] ) 10/03/2020    Panniculitis 10/03/2020    Gastroesophageal reflux disease without esophagitis 10/03/2020    Hx MRSA infection 2020    Left ovarian cyst 2017    Depression with anxiety 07/15/2017    Anemia 2016    Ambulatory dysfunction 2016    Acute kidney injury superimposed on CKD ([place] [address] ) 2016    Adjustment disorder 2013    Irritable bowel syndrome 2012    Left atrial enlargement 2012    Left ventricular hypertrophy 2012    Carpal tunnel syndrome 2012    Gout 2012    Hyperlipidemia 2012    Osteoarthritis 2012    Symptomatic menopausal or female climacteric states 2012      LOS (days): 14  Geometric Mean LOS (GMLOS) (days): 3 20  Days to GMLOS:-10 7     OBJECTIVE:  Risk of Unplanned Readmission Score: 40 55         Current admission status: Inpatient   Preferred Pharmacy:   [facility] [address]  [address]  [place] [address] [address]  Phone: [phone] Fax: [phone]    [facility], [address]  [address]  [place] [address] [address]  Phone: [phone] Fax: [phone]    Primary Care Provider: ORACIO Meek    Primary Insurance: MEDICARE  Secondary Insurance: AARP    DISCHARGE DETAILS:    Discharge planning discussed with[de-identified] patient and Leobardo Nicholas at 13 :42pm [phone]  Freedom of Choice: Yes  Comments - Freedom of Choice: cm spoke with Leobardo Nicholas from [facility] the care giver agency from the [address] [place] [address] program, pt receives 12 hrs /day  she does qualify for more hours but they are unable to find care givers,  pt stated she called [facility] to schedule her ride home at 10 am, cm placed a call to Leticia Madrigal and they have her on the schedule for Monday I was told to send the request as we ryder do,  CM contacted family/caregiver?: Yes             Contacts  Patient Contacts: Karla Thurman  Relationship to Patient[de-identified] Family (niece)  Contact Method: Phone  Phone Number: [phone]  Reason/Outcome: Discharge Planning                   Would you like to participate in our [address] service program?  : No - Declined    Treatment Team Recommendation: Home

## 2022-06-11 NOTE — ASSESSMENT & PLAN NOTE
Wt Readings from Last 3 Encounters:   06/11/22 (!) 206 kg (453 lb 14 8 oz)   05/08/22 (!) 212 kg (467 lb)   05/06/22 (!) 193 kg (425 lb)     · Diuresed for lower extremity swelling with IV Lasix  · Monitor BMP, diuretics on hold at this time  · Continue fluid restriction, low-sodium diet

## 2022-06-11 NOTE — NURSING NOTE
Complete am care done and pt tolerated well, abd folds clean and cream applied with 4by4s, pt tolerated well, xray done, pt in bed at this time in no acute distress watching tv

## 2022-06-11 NOTE — PLAN OF CARE
Problem: Potential for Falls  Goal: Patient will remain free of falls  Description: INTERVENTIONS:  - Educate patient/family on patient safety including physical limitations  - Instruct patient to call for assistance with activity   - Consult OT/PT to assist with strengthening/mobility   - Keep Call bell within reach  - Keep bed low and locked with side rails adjusted as appropriate  - Keep care items and personal belongings within reach  - Initiate and maintain comfort rounds  - Make Fall Risk Sign visible to staff  - Offer Toileting in advance of need  - Initiate/Maintain bed alarm  - Obtain necessary fall risk management equipment:   - Apply yellow socks and bracelet for high fall risk patients  - Consider moving patient to room near nurses station  Outcome: Progressing     Problem: PAIN - ADULT  Goal: Verbalizes/displays adequate comfort level or baseline comfort level  Description: Interventions:  - Encourage patient to monitor pain and request assistance  - Assess pain using appropriate pain scale  - Administer analgesics based on type and severity of pain and evaluate response  - Implement non-pharmacological measures as appropriate and evaluate response  - Consider cultural and social influences on pain and pain management  - Notify physician/advanced practitioner if interventions unsuccessful or patient reports new pain  Outcome: Progressing     Problem: INFECTION - ADULT  Goal: Absence or prevention of progression during hospitalization  Description: INTERVENTIONS:  - Assess and monitor for signs and symptoms of infection  - Monitor lab/diagnostic results  - Monitor all insertion sites, i e  indwelling lines, tubes, and drains  - Monitor endotracheal if appropriate and nasal secretions for changes in amount and color  - [place] appropriate cooling/warming therapies per order  - Administer medications as ordered  - Instruct and encourage patient and family to use good hand hygiene technique  - Identify and instruct in appropriate isolation precautions for identified infection/condition  Outcome: Progressing  Goal: Absence of fever/infection during neutropenic period  Description: INTERVENTIONS:  - Monitor WBC    Outcome: Progressing     Problem: SAFETY ADULT  Goal: Maintain or return to baseline ADL function  Description: INTERVENTIONS:  -  Assess patient's ability to carry out ADLs; assess patient's baseline for ADL function and identify physical deficits which impact ability to perform ADLs (bathing, care of mouth/teeth, toileting, grooming, dressing, etc )  - Assess/evaluate cause of self-care deficits   - Assess range of motion  - Assess patient's mobility; develop plan if impaired  - Assess patient's need for assistive devices and provide as appropriate  - Encourage maximum independence but intervene and supervise when necessary  - Involve family in performance of ADLs  - Assess for home care needs following discharge   - Consider OT consult to assist with ADL evaluation and planning for discharge  - Provide patient education as appropriate  Outcome: Progressing  Goal: Maintains/Returns to pre admission functional level  Description: INTERVENTIONS:  - Perform BMAT or MOVE assessment daily    - Set and communicate daily mobility goal to care team and patient/family/caregiver  - Collaborate with rehabilitation services on mobility goals if consulted  - Reposition patient every two hours    - Record patient progress and toleration of activity level   Outcome: Progressing     Problem: DISCHARGE PLANNING  Goal: Discharge to home or other facility with appropriate resources  Description: INTERVENTIONS:  - Identify barriers to discharge w/patient and caregiver  - Arrange for needed discharge resources and transportation as appropriate  - Identify discharge learning needs (meds, wound care, etc )  - Refer to Case Management Department for coordinating discharge planning if the patient needs post-hospital services based on physician/advanced practitioner order or complex needs related to functional status, cognitive ability, or social support system  Outcome: Progressing     Problem: Knowledge Deficit  Goal: Patient/family/caregiver demonstrates understanding of disease process, treatment plan, medications, and discharge instructions  Description: Complete learning assessment and assess knowledge base    Interventions:  - Provide teaching at level of understanding  - Provide teaching via preferred learning methods  Outcome: Progressing     Problem: SKIN/TISSUE INTEGRITY - ADULT  Goal: Skin Integrity remains intact(Skin Breakdown Prevention)  Description: Assess:  -Perform Gucci assessment every shift  -Clean and moisturize skin   -Inspect skin when repositioning, toileting, and assisting with ADLS  -Assess under medical devices   -Assess extremities for adequate circulation and sensation     Bed Management:  -Have minimal linens on bed & keep smooth, unwrinkled  -Change linens as needed when moist or perspiring  -Avoid sitting or lying in one position while in bed    Toileting:  -Offer bedside commode  -Assess for incontinence   -Use incontinent care products after each incontinent episode    Activity:  -Encourage or provide ROM exercises   -Turn and reposition patient every two Hours  -Use appropriate equipment to lift or move patient in bed  -Instruct/ Assist with weight shifting when out of bed in chair  -Consider limitation of chair time     Skin Care:  -Avoid use of baby powder, tape, friction and shearing, hot water or constrictive clothing  -Relieve pressure over bony prominences  -Do not massage red bony areas    Next Steps:  -Teach patient strategies to minimize risks    -Consider consults to  interdisciplinary teams   Outcome: Progressing  Goal: Incision(s), wounds(s) or drain site(s) healing without S/S of infection  Description: INTERVENTIONS  - Assess and document dressing, incision, wound bed, drain sites and surrounding tissue  - Provide patient and family education  - Perform skin care/dressing changes per order  Outcome: Progressing  Goal: Pressure injury heals and does not worsen  Description: Interventions:  - Implement low air loss mattress or specialty surface (Criteria met)  - Apply silicone foam dressing  - Instruct/assist with weight shifting when in chair   - Limit chair time   - Use special pressure reducing interventions  when in chair   - Apply fecal or urinary incontinence containment device   - Perform passive or active ROM   - Turn and reposition patient & offload bony prominences every two hours   - Utilize friction reducing device or surface for transfers   - Consider consults to  interdisciplinary teams   - Use incontinent care products after each incontinent episode  - Consider nutrition services referral as needed  Outcome: Progressing     Problem: MOBILITY - ADULT  Goal: Maintain or return to baseline ADL function  Description: INTERVENTIONS:  -  Assess patient's ability to carry out ADLs; assess patient's baseline for ADL function and identify physical deficits which impact ability to perform ADLs (bathing, care of mouth/teeth, toileting, grooming, dressing, etc )  - Assess/evaluate cause of self-care deficits   - Assess range of motion  - Assess patient's mobility; develop plan if impaired  - Assess patient's need for assistive devices and provide as appropriate  - Encourage maximum independence but intervene and supervise when necessary  - Involve family in performance of ADLs  - Assess for home care needs following discharge   - Consider OT consult to assist with ADL evaluation and planning for discharge  - Provide patient education as appropriate  Outcome: Progressing  Goal: Maintains/Returns to pre admission functional level  Description: INTERVENTIONS:  - Perform BMAT or MOVE assessment daily    - Set and communicate daily mobility goal to care team and patient/family/caregiver     - Collaborate with rehabilitation services on mobility goals if consulted  - Reposition patient every two hours  - Record patient progress and toleration of activity level   Outcome: Progressing     Problem: Prexisting or High Potential for Compromised Skin Integrity  Goal: Skin integrity is maintained or improved  Description: INTERVENTIONS:  - Identify patients at risk for skin breakdown  - Assess and monitor skin integrity  - Assess and monitor nutrition and hydration status  - Monitor labs   - Assess for incontinence   - Turn and reposition patient  - Assist with mobility/ambulation  - Relieve pressure over bony prominences  - Avoid friction and shearing  - Provide appropriate hygiene as needed including keeping skin clean and dry  - Evaluate need for skin moisturizer/barrier cream  - Collaborate with interdisciplinary team   - Patient/family teaching  - Consider wound care consult   Outcome: Progressing     Problem: Nutrition/Hydration-ADULT  Goal: Nutrient/Hydration intake appropriate for improving, restoring or maintaining nutritional needs  Description: Monitor and assess patient's nutrition/hydration status for malnutrition  Collaborate with interdisciplinary team and initiate plan and interventions as ordered  Monitor patient's weight and dietary intake as ordered or per policy  Utilize nutrition screening tool and intervene as necessary  Determine patient's food preferences and provide high-protein, high-caloric foods as appropriate       INTERVENTIONS:  - Monitor oral intake, urinary output, labs, and treatment plans  - Assess nutrition and hydration status and recommend course of action  - Evaluate amount of meals eaten  - Assist patient with eating if necessary   - Allow adequate time for meals  - Recommend/ encourage appropriate diets, oral nutritional supplements, and vitamin/mineral supplements  - Order, calculate, and assess calorie counts as needed  - Recommend, monitor, and adjust tube feedings and TPN/PPN based on assessed needs  - Assess need for intravenous fluids  - Provide specific nutrition/hydration education as appropriate  - Include patient/family/caregiver in decisions related to nutrition  Outcome: Progressing

## 2022-06-11 NOTE — ASSESSMENT & PLAN NOTE
· Creatininine continue to be elevated, possibly due to overdiuresis  · Check urine studies, NA, OSM, Urea and Creatinine  · Held lasix, last given on 06/06  · Has received 2 L in total, will give another 1 L  · Nephrology recommendations appreciated

## 2022-06-11 NOTE — ASSESSMENT & PLAN NOTE
· Body mass index is 83 02 kg/m²     · Risk factor for recurrent infection and failure of oral antibiotics  · Continue supportive care and recommend outpatient evaluation

## 2022-06-11 NOTE — ASSESSMENT & PLAN NOTE
· Patient reports atraumatic left shoulder pain x3 days, and she asked for stronger pain medicine yesterday (was given oxycodone x 1)  · Left shoulder x-ray  · Oxycodone every 6 hours as needed

## 2022-06-11 NOTE — PROGRESS NOTES
[facility]  [facility] 1950[facility] 80 [facility]  [facility] MRN: [id number]  Unit/Bed#: -01 Encounter: [id number]  Primary Care Provider: ORACIO Tsai   Date and time admitted to hospital: 5/27/2022  7:52 PM    * Panniculitis  Assessment & Plan  81-year-old female with morbid obesity and diastolic CHF recently hospitalized at [facility] who re-presented for recurrent cellulitis of pannus in setting of morbid obesity, chronic lymphedema, increased moisture and superficial skin breakdown  · Wound culture with pseudomonas and proteus  · Cefepime 2gm q8 completed on 6/10   · PO vanc for c diff through 06/12 for prophylaxis  · Surgery evaluated, no surgical intervention or abscess noted  · Continue aggressive skin care with minimizing friction and moisture related skin breakdown  Frequent offloading and repositioning  · Recommend that patient be evaluated outpatient by plastic surgery outpatient for chronic lymphedema, and bariatrics  · Patient's home caretaker out sick until Monday  Agency unable to find replacement      History of Clostridioides difficile infection  Assessment & Plan  · Continue prophylactic oral vancomycin for 72 hours beyond systemic antibiotics (end 6/12)  · Continue probiotics    Acute pain of left shoulder  Assessment & Plan  · Patient reports atraumatic left shoulder pain x3 days, and she asked for stronger pain medicine yesterday (was given oxycodone x 1)  · Left shoulder x-ray  · Oxycodone every 6 hours as needed    BMI 70 and over, adult [facility])  Assessment & Plan  · Continue supportive care  · Risk factor for recurrent infections    Positive blood culture  Assessment & Plan  · Positive blood culture may be contaminant as is coag negative staph  · Does have history of MRSE bacteremia  · Appreciate infectious disease evaluation    · Low suspicion for true bacteremia and endovascular infection  · Completed vancomycin until 06/05    Chronic atrial fibrillation (HCC)  Assessment & Plan  · Does not appear to be on rate control  · INR therapeutic, continue coumadin    Gout  Assessment & Plan  · Continue allopurinol    Depression with anxiety  Assessment & Plan  · Mood stable   · Continue duloxetine    Acute kidney injury superimposed on CKD [facility])  Assessment & Plan  · Creatininine continue to be elevated, possibly due to overdiuresis  · Check urine studies, NA, OSM, Urea and Creatinine  · Held lasix, last given on 06/06  · Has received 2 L in total, will give another 1 L  · Nephrology recommendations appreciated    Lymphedema of extremity  Assessment & Plan  · Chronic lymphedema with panniculitis  · Treated with six days of IV Lasix to expedite diuresis  · Serial assessments    Chronic diastolic congestive heart failure (HCC)  Assessment & Plan  Wt Readings from Last 3 Encounters:   06/11/22 (!) 206 kg (453 lb 14 8 oz)   05/08/22 (!) 212 kg (467 lb)   05/06/22 (!) 193 kg (425 lb)     · Diuresed for lower extremity swelling with IV Lasix  · Monitor BMP, diuretics on hold at this time  · Continue fluid restriction, low-sodium diet    Class 3 severe obesity due to excess calories with serious comorbidity and body mass index (BMI) greater than or equal to 70 in adult [facility])  Assessment & Plan  · Body mass index is 83 02 kg/m²  · Risk factor for recurrent infection and failure of oral antibiotics  · Continue supportive care and recommend outpatient evaluation    Other specified hypothyroidism  Assessment & Plan  · Continue levothyroxine    VTE Pharmacologic Prophylaxis:   Pharmacologic: Warfarin (Coumadin)  Mechanical VTE Prophylaxis in Place: Yes    Patient Centered Rounds: I have performed bedside rounds with nursing staff today  Discussions with Specialists or Other Care Team Provider:  RN    Education and Discussions with Family / Patient:  Patient    Time Spent for Care: 20 minutes    More than 50% of total time spent on counseling and coordination of care as described above  Current Length of Stay: 15 day(s)    Current Patient Status: Inpatient   Certification Statement: The patient will continue to require additional inpatient hospital stay due to per plan above  Discharge Plan: To be determined  Code Status: Level 1 - Full Code      Subjective:   Patient seen and examined  She says her left shoulder has been bothering her the past several days  No other complaints offered  Objective:     Vitals:   Temp (24hrs), Av 2 °F (36 2 °C), Min:96 9 °F (36 1 °C), Max:97 6 °F (36 4 °C)    Temp:  [96 9 °F (36 1 °C)-97 6 °F (36 4 °C)] 97 °F (36 1 °C)  HR:  [61-64] 63  Resp:  [18] 18  BP: (115-125)/(55-69) 115/59  SpO2:  [92 %-99 %] 92 %  Body mass index is 83 02 kg/m²  Input and Output Summary (last 24 hours): Intake/Output Summary (Last 24 hours) at 2022 1330  Last data filed at 2022 1300  Gross per 24 hour   Intake 1181 67 ml   Output 320 ml   Net 861 67 ml       Physical Exam:     Physical Exam  Vitals and nursing note reviewed  Constitutional:       Appearance: She is obese  HENT:      Head: Normocephalic and atraumatic  Mouth/Throat:      Pharynx: Oropharynx is clear  Eyes:      Pupils: Pupils are equal, round, and reactive to light  Cardiovascular:      Rate and Rhythm: Normal rate  Pulmonary:      Effort: Pulmonary effort is normal  No respiratory distress  Breath sounds: Normal breath sounds  Abdominal:      General: Bowel sounds are normal       Palpations: Abdomen is soft  Tenderness: There is no abdominal tenderness  Musculoskeletal:         General: Tenderness present  Cervical back: Neck supple  Comments: Left shoulder tenderness   Skin:     General: Skin is warm and dry  Capillary Refill: Capillary refill takes less than 2 seconds  Comments: Left pannus erythema   Neurological:      General: No focal deficit present        Mental Status: She is alert and oriented to person, place, and time  Additional Data:     Labs:    Results from last 7 days   Lab Units 06/11/22  0548   WBC Thousand/uL 10 32*   HEMOGLOBIN g/dL 9 9*   HEMATOCRIT % 33 5*   PLATELETS Thousands/uL 377   NEUTROS PCT % 78*   LYMPHS PCT % 11*   MONOS PCT % 7   EOS PCT % 3     Results from last 7 days   Lab Units 06/11/22  0548 06/06/22  0510 06/05/22  0427   SODIUM mmol/L 133*   < > 133*   POTASSIUM mmol/L 4 4   < > 3 4*   CHLORIDE mmol/L 97   < > 95*   CO2 mmol/L 27   < > 31   BUN mg/dL 58*   < > 34*   CREATININE mg/dL 2 07*   < > 1 29   ANION GAP mmol/L 9   < > 7   CALCIUM mg/dL 8 5   < > 9 0   ALBUMIN g/dL  --   --  3 0*   TOTAL BILIRUBIN mg/dL  --   --  0 73   ALK PHOS U/L  --   --  91   ALT U/L  --   --  14   AST U/L  --   --  19   GLUCOSE RANDOM mg/dL 99   < > 130    < > = values in this interval not displayed  Results from last 7 days   Lab Units 06/11/22  0548   INR  3 11*                       * I Have Reviewed All Lab Data Listed Above  * Additional Pertinent Lab Tests Reviewed:  [address] [facility] Admission Reviewed          Last 24 Hours Medication List:   Current Facility-Administered Medications   Medication Dose Route Frequency Provider Last Rate    acetaminophen  650 mg Oral Q4H PRN Rodo George PA-C      allopurinol  100 mg Oral Daily [place], [place]      DULoxetine  20 mg Oral Daily [place], [place]      levothyroxine  125 mcg Oral Daily [place], [place]      ondansetron  4 mg Intravenous Q6H PRN Rodo George [place]      oxyCODONE  5 mg Oral Q6H PRN ORACIO Cho      saccharomyces boulardii  250 mg Oral BID Rodo George [place]      triamcinolone   Topical BID [place], [place]      vancomycin  125 mg Oral Q6H [address] Fabio Phillips MD      warfarin  5 mg Oral Every Other Day Magdalena Thurman MD      And    warfarin  2 mg Oral Every Other Velasquez Avila MD Today, Patient Was Seen By: ORACIO Francisco    ** Please Note: Dictation voice to text software may have been used in the creation of this document   **

## 2022-06-11 NOTE — PROGRESS NOTES
Progress Note - Nephrology   Rhona Case 80 y o  female MRN: [id number]  Unit/Bed#: -01 Encounter: [id number]    A/P:  1  Acute kidney injury on top of chronic kidney disease               workup remains pending at this time, but thus far the patient's urine eosinophils have come back negative, complements remain pending to rule out a postinfectious glomerular nephritis which is very low on the potential differential but given recent Staphylococcus septicemia should be ruled out  Patient's fraction excretion of sodium has come back less than 1%, will provide additional L of isotonic saline at 100 mL/hour for now  2  Chronic kidney disease stage 2 with baseline creatinine between 1-1 1 mg/dL  3  Hypovolemic hyponatremia               serum sodium level slightly improved over last 24 hours status post volume expansion, continue to provide volume at this time as mentioned above with an additional L    4  Volume depletion               as noted above continue volume expansion    5  Panniculitis               continue antibiotics according to hospitalist recommendations, currently on cefepime due to Pseudomonas sensitivities  6  Severe morbid obesity with BMI of 82   7  Chronic diastolic congestive heart failure   Patient appears to be compensated at this time, her diuretics are currently on hold      Follow up reason for today's visit:  Acute kidney injury/chronic kidney disease/hyponatremia/volume depletion    Panniculitis    Patient Active Problem List   Diagnosis    Other specified hypothyroidism    Mild episode of recurrent major depressive disorder ([address] )    Idiopathic chronic 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    Primary osteoarthritis involving multiple joints    Class 3 severe obesity due to excess calories with serious comorbidity and body mass index (BMI) greater than or equal to 70 in adult [facility])    Chronic diastolic congestive heart failure (HCC)    Panniculitis    Gastroesophageal reflux disease without esophagitis    Lymphedema of extremity    Adjustment disorder    Ambulatory dysfunction    Anemia    Carpal tunnel syndrome    Acute kidney injury superimposed on CKD ([address] )    Depression with anxiety    Gout    Hx MRSA infection    Hyperlipidemia    Irritable bowel syndrome    Left atrial enlargement    Left ovarian cyst    Left ventricular hypertrophy    Osteoarthritis    Symptomatic menopausal or female climacteric states    C  difficile colitis    History of Clostridioides difficile infection    Chronic atrial fibrillation (HCC)    Positive blood culture    BMI 70 and over, adult ([address] )    Abdominal wall cellulitis         Subjective:   Patient denies nausea vomiting or diarrhea  Objective:     Vitals: Blood pressure 115/59, pulse 63, temperature (!) 97 °F (36 1 °C), resp  rate 18, height 5' 2" (1 575 m), weight (!) 206 kg (453 lb 14 8 oz), SpO2 92 %  ,Body mass index is 83 02 kg/m²  Weight (last 2 days)     Date/Time Weight    06/11/22 0600 206 (453 93)    06/10/22 0600 205 (451 28)    06/09/22 0440 203 (447 98)            Intake/Output Summary (Last 24 hours) at 6/11/2022 1245  Last data filed at 6/11/2022 1001  Gross per 24 hour   Intake 1301 67 ml   Output 320 ml   Net 981 67 ml     I/O last 3 completed shifts:   In: 2301 7 [P O :480; I V :1821 7]  Out: 200 [Urine:200]         Physical Exam: /59   Pulse 63   Temp (!) 97 °F (36 1 °C)   Resp 18   Ht 5' 2" (1 575 m)   Wt (!) 206 kg (453 lb 14 8 oz)   LMP  (LMP Unknown)   SpO2 92%   BMI 83 02 kg/m²     General Appearance:    Alert, cooperative, no distress, appears stated age   Head:    Normocephalic, without obvious abnormality, atraumatic   Eyes:    Conjunctiva/corneas clear   Ears:    Normal external ears   Nose:   Nares normal, septum midline, mucosa normal, no drainage    or sinus tenderness   Throat:   Lips, mucosa, and tongue normal; teeth and gums normal   Neck:   Supple Back:     Symmetric, no curvature, ROM normal, no CVA tenderness   Lungs:     Clear to auscultation bilaterally, respirations unlabored   Chest wall:    No tenderness or deformity   Heart:    Regular rate and rhythm, S1 and S2 normal, no murmur, rub   or gallop   Abdomen:     Soft, non-tender, bowel sounds active   Extremities:   Extremities normal, atraumatic, no cyanosis or edema   Skin:   Skin color, texture, turgor normal, no rashes or lesions   Lymph nodes:   Cervical normal   Neurologic:   CNII-XII intact            Lab, Imaging and other studies: I have personally reviewed pertinent labs  CBC:   Lab Results   Component Value Date    WBC 10 32 (H) 06/11/2022    HGB 9 9 (L) 06/11/2022    HCT 33 5 (L) 06/11/2022    MCV 83 06/11/2022     06/11/2022    MCH 24 5 (L) 06/11/2022    MCHC 29 6 (L) 06/11/2022    RDW 16 8 (H) 06/11/2022    MPV 8 7 (L) 06/11/2022    NRBC 0 06/11/2022     CMP:   Lab Results   Component Value Date    K 4 4 06/11/2022    CL 97 06/11/2022    CO2 27 06/11/2022    BUN 58 (H) 06/11/2022    CREATININE 2 07 (H) 06/11/2022    CALCIUM 8 5 06/11/2022    EGFR 23 06/11/2022         Results from last 7 days   Lab Units 06/11/22  0548 06/10/22  0431 06/09/22  0438 06/06/22  0510 06/05/22  0427   POTASSIUM mmol/L 4 4 4 2 3 9   < > 3 4*   CHLORIDE mmol/L 97 97 96   < > 95*   CO2 mmol/L 27 27 27   < > 31   BUN mg/dL 58* 50* 47*   < > 34*   CREATININE mg/dL 2 07* 1 79* 1 56*   < > 1 29   CALCIUM mg/dL 8 5 8 9 9 1   < > 9 0   ALK PHOS U/L  --   --   --   --  91   ALT U/L  --   --   --   --  14   AST U/L  --   --   --   --  19    < > = values in this interval not displayed           Phosphorus: No results found for: PHOS  Magnesium: No results found for: MG  Urinalysis: No results found for: COLORU, CLARITYU, SPECGRAV, PHUR, LEUKOCYTESUR, NITRITE, PROTEINUA, GLUCOSEU, KETONESU, BILIRUBINUR, BLOODU  Ionized Calcium: No results found for: CAION  Coagulation:   Lab Results   Component Value Date    INR 3 11 (H) 06/11/2022     Troponin: No results found for: TROPONINI  ABG: No results found for: PHART, MFN4WVD, PO2ART, ECR9YVS, W9WNKTLC, BEART, SOURCE  Radiology review:     IMAGING  No results found      Current Facility-Administered Medications   Medication Dose Route Frequency    acetaminophen (TYLENOL) tablet 650 mg  650 mg Oral Q4H PRN    allopurinol (ZYLOPRIM) tablet 100 mg  100 mg Oral Daily    DULoxetine (CYMBALTA) delayed release capsule 20 mg  20 mg Oral Daily    levothyroxine tablet 125 mcg  125 mcg Oral Daily    ondansetron (ZOFRAN) injection 4 mg  4 mg Intravenous Q6H PRN    saccharomyces boulardii (FLORASTOR) capsule 250 mg  250 mg Oral BID    sodium chloride 0 9 % infusion  100 mL/hr Intravenous Continuous    triamcinolone (KENALOG) 0 1 % cream   Topical BID    vancomycin (VANCOCIN) oral solution 125 mg  125 mg Oral Q6H [address]    warfarin (COUMADIN) tablet 5 mg  5 mg Oral Every Other Day    And    warfarin (COUMADIN) tablet 2 mg  2 mg Oral Every Other Day     Medications Discontinued During This Encounter   Medication Reason    vancomycin (VANCOCIN) 1,750 mg in sodium chloride 0 9 % 500 mL IVPB     nystatin (MYCOSTATIN) powder     warfarin (COUMADIN) tablet 4 mg     warfarin (COUMADIN) tablet 2 mg     warfarin (COUMADIN) tablet 4 mg     warfarin (COUMADIN) tablet 2 mg     enoxaparin (LOVENOX) subcutaneous injection 60 mg     vancomycin (VANCOCIN) 2,000 mg in sodium chloride 0 9 % 500 mL IVPB     cefepime (MAXIPIME) injection 2,000 mg     vancomycin (VANCOCIN) 1500 mg in sodium chloride 0 9% 250 mL IVPB     vancomycin (VANCOCIN) 1500 mg in sodium chloride 0 9% 250 mL IVPB     vancomycin (VANCOCIN) 1500 mg in sodium chloride 0 9% 250 mL IVPB     vancomycin (VANCOCIN) 1500 mg in sodium chloride 0 9% 250 mL IVPB     vancomycin (VANCOCIN) 1500 mg in sodium chloride 0 9% 250 mL IVPB     furosemide (LASIX) injection 40 mg     warfarin (COUMADIN) tablet 5 mg     warfarin (COUMADIN) tablet 2 mg     furosemide (LASIX) tablet 40 mg     furosemide (LASIX) tablet 40 mg     levofloxacin (LEVAQUIN) tablet 750 mg     sodium chloride 0 9 % infusion        Jackie Lucio, DO      This progress note was produced in part using a dictation device which may document imprecise wording from author's original intent

## 2022-06-11 NOTE — PLAN OF CARE
Problem: Potential for Falls  Goal: Patient will remain free of falls  Description: INTERVENTIONS:  - Educate patient/family on patient safety including physical limitations  - Instruct patient to call for assistance with activity   - Consult OT/PT to assist with strengthening/mobility   - Keep Call bell within reach  - Keep bed low and locked with side rails adjusted as appropriate  - Keep care items and personal belongings within reach  - Initiate and maintain comfort rounds  - Make Fall Risk Sign visible to staff  - Offer Toileting in advance of need  - Initiate/Maintain bed alarm  - Obtain necessary fall risk management equipment:   - Apply yellow socks and bracelet for high fall risk patients  - Consider moving patient to room near nurses station  Outcome: Progressing     Problem: PAIN - ADULT  Goal: Verbalizes/displays adequate comfort level or baseline comfort level  Description: Interventions:  - Encourage patient to monitor pain and request assistance  - Assess pain using appropriate pain scale  - Administer analgesics based on type and severity of pain and evaluate response  - Implement non-pharmacological measures as appropriate and evaluate response  - Consider cultural and social influences on pain and pain management  - Notify physician/advanced practitioner if interventions unsuccessful or patient reports new pain  Outcome: Progressing     Problem: INFECTION - ADULT  Goal: Absence or prevention of progression during hospitalization  Description: INTERVENTIONS:  - Assess and monitor for signs and symptoms of infection  - Monitor lab/diagnostic results  - Monitor all insertion sites, i e  indwelling lines, tubes, and drains  - Monitor endotracheal if appropriate and nasal secretions for changes in amount and color  - [place] appropriate cooling/warming therapies per order  - Administer medications as ordered  - Instruct and encourage patient and family to use good hand hygiene technique  - Identify and instruct in appropriate isolation precautions for identified infection/condition  Outcome: Progressing  Goal: Absence of fever/infection during neutropenic period  Description: INTERVENTIONS:  - Monitor WBC    Outcome: Progressing     Problem: SAFETY ADULT  Goal: Maintain or return to baseline ADL function  Description: INTERVENTIONS:  -  Assess patient's ability to carry out ADLs; assess patient's baseline for ADL function and identify physical deficits which impact ability to perform ADLs (bathing, care of mouth/teeth, toileting, grooming, dressing, etc )  - Assess/evaluate cause of self-care deficits   - Assess range of motion  - Assess patient's mobility; develop plan if impaired  - Assess patient's need for assistive devices and provide as appropriate  - Encourage maximum independence but intervene and supervise when necessary  - Involve family in performance of ADLs  - Assess for home care needs following discharge   - Consider OT consult to assist with ADL evaluation and planning for discharge  - Provide patient education as appropriate  Outcome: Progressing  Goal: Maintains/Returns to pre admission functional level  Description: INTERVENTIONS:  - Perform BMAT or MOVE assessment daily    - Set and communicate daily mobility goal to care team and patient/family/caregiver  - Collaborate with rehabilitation services on mobility goals if consulted  - Reposition patient every two hours    - Record patient progress and toleration of activity level   Outcome: Progressing     Problem: DISCHARGE PLANNING  Goal: Discharge to home or other facility with appropriate resources  Description: INTERVENTIONS:  - Identify barriers to discharge w/patient and caregiver  - Arrange for needed discharge resources and transportation as appropriate  - Identify discharge learning needs (meds, wound care, etc )  - Refer to Case Management Department for coordinating discharge planning if the patient needs post-hospital services based on physician/advanced practitioner order or complex needs related to functional status, cognitive ability, or social support system  Outcome: Progressing     Problem: Knowledge Deficit  Goal: Patient/family/caregiver demonstrates understanding of disease process, treatment plan, medications, and discharge instructions  Description: Complete learning assessment and assess knowledge base    Interventions:  - Provide teaching at level of understanding  - Provide teaching via preferred learning methods  Outcome: Progressing     Problem: SKIN/TISSUE INTEGRITY - ADULT  Goal: Skin Integrity remains intact(Skin Breakdown Prevention)  Description: Assess:  -Perform [place]cci assessment every shift  -Clean and moisturize skin   -Inspect skin when repositioning, toileting, and assisting with ADLS  -Assess under medical devices   -Assess extremities for adequate circulation and sensation     Bed Management:  -Have minimal linens on bed & keep smooth, unwrinkled  -Change linens as needed when moist or perspiring  -Avoid sitting or lying in one position while in bed    Toileting:  -Offer bedside commode  -Assess for incontinence   -Use incontinent care products after each incontinent episode    Activity:  -Encourage or provide ROM exercises   -Turn and reposition patient every two Hours  -Use appropriate equipment to lift or move patient in bed  -Instruct/ Assist with weight shifting when out of bed in chair  -Consider limitation of chair time     Skin Care:  -Avoid use of baby powder, tape, friction and shearing, hot water or constrictive clothing  -Relieve pressure over bony prominences  -Do not massage red bony areas    Next Steps:  -Teach patient strategies to minimize risks    -Consider consults to  interdisciplinary teams   Outcome: Progressing  Goal: Incision(s), wounds(s) or drain site(s) healing without S/S of infection  Description: INTERVENTIONS  - Assess and document dressing, incision, wound bed, drain sites and surrounding tissue  - Provide patient and family education  - Perform skin care/dressing changes per order  Outcome: Progressing  Goal: Pressure injury heals and does not worsen  Description: Interventions:  - Implement low air loss mattress or specialty surface (Criteria met)  - Apply silicone foam dressing  - Instruct/assist with weight shifting when in chair   - Limit chair time   - Use special pressure reducing interventions  when in chair   - Apply fecal or urinary incontinence containment device   - Perform passive or active ROM   - Turn and reposition patient & offload bony prominences every two hours   - Utilize friction reducing device or surface for transfers   - Consider consults to  interdisciplinary teams   - Use incontinent care products after each incontinent episode  - Consider nutrition services referral as needed  Outcome: Progressing     Problem: MOBILITY - ADULT  Goal: Maintain or return to baseline ADL function  Description: INTERVENTIONS:  -  Assess patient's ability to carry out ADLs; assess patient's baseline for ADL function and identify physical deficits which impact ability to perform ADLs (bathing, care of mouth/teeth, toileting, grooming, dressing, etc )  - Assess/evaluate cause of self-care deficits   - Assess range of motion  - Assess patient's mobility; develop plan if impaired  - Assess patient's need for assistive devices and provide as appropriate  - Encourage maximum independence but intervene and supervise when necessary  - Involve family in performance of ADLs  - Assess for home care needs following discharge   - Consider OT consult to assist with ADL evaluation and planning for discharge  - Provide patient education as appropriate  Outcome: Progressing  Goal: Maintains/Returns to pre admission functional level  Description: INTERVENTIONS:  - Perform BMAT or MOVE assessment daily    - Set and communicate daily mobility goal to care team and patient/family/caregiver     - Collaborate with rehabilitation services on mobility goals if consulted  - Reposition patient every two hours  - Record patient progress and toleration of activity level   Outcome: Progressing     Problem: Prexisting or High Potential for Compromised Skin Integrity  Goal: Skin integrity is maintained or improved  Description: INTERVENTIONS:  - Identify patients at risk for skin breakdown  - Assess and monitor skin integrity  - Assess and monitor nutrition and hydration status  - Monitor labs   - Assess for incontinence   - Turn and reposition patient  - Assist with mobility/ambulation  - Relieve pressure over bony prominences  - Avoid friction and shearing  - Provide appropriate hygiene as needed including keeping skin clean and dry  - Evaluate need for skin moisturizer/barrier cream  - Collaborate with interdisciplinary team   - Patient/family teaching  - Consider wound care consult   Outcome: Progressing     Problem: Nutrition/Hydration-ADULT  Goal: Nutrient/Hydration intake appropriate for improving, restoring or maintaining nutritional needs  Description: Monitor and assess patient's nutrition/hydration status for malnutrition  Collaborate with interdisciplinary team and initiate plan and interventions as ordered  Monitor patient's weight and dietary intake as ordered or per policy  Utilize nutrition screening tool and intervene as necessary  Determine patient's food preferences and provide high-protein, high-caloric foods as appropriate       INTERVENTIONS:  - Monitor oral intake, urinary output, labs, and treatment plans  - Assess nutrition and hydration status and recommend course of action  - Evaluate amount of meals eaten  - Assist patient with eating if necessary   - Allow adequate time for meals  - Recommend/ encourage appropriate diets, oral nutritional supplements, and vitamin/mineral supplements  - Order, calculate, and assess calorie counts as needed  - Recommend, monitor, and adjust tube feedings and TPN/PPN based on assessed needs  - Assess need for intravenous fluids  - Provide specific nutrition/hydration education as appropriate  - Include patient/family/caregiver in decisions related to nutrition  Outcome: Progressing

## 2022-06-12 LAB
ANION GAP SERPL CALCULATED.3IONS-SCNC: 10 MMOL/L (ref 4–13)
BASOPHILS # BLD AUTO: 0.05 THOUSANDS/ΜL (ref 0–0.1)
BASOPHILS NFR BLD AUTO: 1 % (ref 0–1)
BUN SERPL-MCNC: 61 MG/DL (ref 5–25)
CALCIUM SERPL-MCNC: 8.9 MG/DL (ref 8.4–10.2)
CHLORIDE SERPL-SCNC: 98 MMOL/L (ref 96–108)
CO2 SERPL-SCNC: 26 MMOL/L (ref 21–32)
CREAT SERPL-MCNC: 2.3 MG/DL (ref 0.6–1.3)
EOSINOPHIL # BLD AUTO: 0.25 THOUSAND/ΜL (ref 0–0.61)
EOSINOPHIL NFR BLD AUTO: 2 % (ref 0–6)
ERYTHROCYTE [DISTWIDTH] IN BLOOD BY AUTOMATED COUNT: 16.8 % (ref 11.6–15.1)
GFR SERPL CREATININE-BSD FRML MDRD: 20 ML/MIN/1.73SQ M
GLUCOSE SERPL-MCNC: 98 MG/DL (ref 65–140)
HCT VFR BLD AUTO: 34 % (ref 34.8–46.1)
HGB BLD-MCNC: 10 G/DL (ref 11.5–15.4)
IMM GRANULOCYTES # BLD AUTO: 0.04 THOUSAND/UL (ref 0–0.2)
IMM GRANULOCYTES NFR BLD AUTO: 0 % (ref 0–2)
INR PPP: 3.01 (ref 0.84–1.19)
LYMPHOCYTES # BLD AUTO: 1.02 THOUSANDS/ΜL (ref 0.6–4.47)
LYMPHOCYTES NFR BLD AUTO: 10 % (ref 14–44)
MCH RBC QN AUTO: 24.6 PG (ref 26.8–34.3)
MCHC RBC AUTO-ENTMCNC: 29.4 G/DL (ref 31.4–37.4)
MCV RBC AUTO: 84 FL (ref 82–98)
MONOCYTES # BLD AUTO: 0.59 THOUSAND/ΜL (ref 0.17–1.22)
MONOCYTES NFR BLD AUTO: 6 % (ref 4–12)
NEUTROPHILS # BLD AUTO: 8.49 THOUSANDS/ΜL (ref 1.85–7.62)
NEUTS SEG NFR BLD AUTO: 81 % (ref 43–75)
NRBC BLD AUTO-RTO: 0 /100 WBCS
PLATELET # BLD AUTO: 367 THOUSANDS/UL (ref 149–390)
PMV BLD AUTO: 8.6 FL (ref 8.9–12.7)
POTASSIUM SERPL-SCNC: 4.7 MMOL/L (ref 3.5–5.3)
PROTHROMBIN TIME: 30.4 SECONDS (ref 11.6–14.5)
RBC # BLD AUTO: 4.06 MILLION/UL (ref 3.81–5.12)
SODIUM SERPL-SCNC: 134 MMOL/L (ref 135–147)
WBC # BLD AUTO: 10.44 THOUSAND/UL (ref 4.31–10.16)

## 2022-06-12 PROCEDURE — 99232 SBSQ HOSP IP/OBS MODERATE 35: CPT | Performed by: INTERNAL MEDICINE

## 2022-06-12 PROCEDURE — 99233 SBSQ HOSP IP/OBS HIGH 50: CPT | Performed by: NURSE PRACTITIONER

## 2022-06-12 PROCEDURE — 85025 COMPLETE CBC W/AUTO DIFF WBC: CPT | Performed by: NURSE PRACTITIONER

## 2022-06-12 PROCEDURE — 85610 PROTHROMBIN TIME: CPT | Performed by: NURSE PRACTITIONER

## 2022-06-12 PROCEDURE — 80048 BASIC METABOLIC PNL TOTAL CA: CPT | Performed by: NURSE PRACTITIONER

## 2022-06-12 RX ORDER — WARFARIN SODIUM 2 MG/1
2 TABLET ORAL EVERY OTHER DAY
Status: DISCONTINUED | OUTPATIENT
Start: 2022-06-13 | End: 2022-06-14 | Stop reason: HOSPADM

## 2022-06-12 RX ORDER — WARFARIN SODIUM 2 MG/1
2 TABLET ORAL
Status: COMPLETED | OUTPATIENT
Start: 2022-06-12 | End: 2022-06-12

## 2022-06-12 RX ORDER — SODIUM CHLORIDE, SODIUM GLUCONATE, SODIUM ACETATE, POTASSIUM CHLORIDE, MAGNESIUM CHLORIDE, SODIUM PHOSPHATE, DIBASIC, AND POTASSIUM PHOSPHATE .53; .5; .37; .037; .03; .012; .00082 G/100ML; G/100ML; G/100ML; G/100ML; G/100ML; G/100ML; G/100ML
125 INJECTION, SOLUTION INTRAVENOUS CONTINUOUS
Status: DISCONTINUED | OUTPATIENT
Start: 2022-06-12 | End: 2022-06-13

## 2022-06-12 RX ORDER — WARFARIN SODIUM 5 MG/1
5 TABLET ORAL EVERY OTHER DAY
Status: DISCONTINUED | OUTPATIENT
Start: 2022-06-14 | End: 2022-06-14 | Stop reason: HOSPADM

## 2022-06-12 RX ADMIN — Medication 125 MG: at 11:24

## 2022-06-12 RX ADMIN — Medication 125 MG: at 17:55

## 2022-06-12 RX ADMIN — SODIUM CHLORIDE, SODIUM GLUCONATE, SODIUM ACETATE, POTASSIUM CHLORIDE, MAGNESIUM CHLORIDE, SODIUM PHOSPHATE, DIBASIC, AND POTASSIUM PHOSPHATE 125 ML/HR: .53; .5; .37; .037; .03; .012; .00082 INJECTION, SOLUTION INTRAVENOUS at 17:55

## 2022-06-12 RX ADMIN — OXYCODONE HYDROCHLORIDE 5 MG: 5 TABLET ORAL at 16:09

## 2022-06-12 RX ADMIN — Medication 250 MG: at 09:59

## 2022-06-12 RX ADMIN — LEVOTHYROXINE SODIUM 125 MCG: 25 TABLET ORAL at 09:59

## 2022-06-12 RX ADMIN — SODIUM CHLORIDE, SODIUM GLUCONATE, SODIUM ACETATE, POTASSIUM CHLORIDE, MAGNESIUM CHLORIDE, SODIUM PHOSPHATE, DIBASIC, AND POTASSIUM PHOSPHATE 125 ML/HR: .53; .5; .37; .037; .03; .012; .00082 INJECTION, SOLUTION INTRAVENOUS at 10:00

## 2022-06-12 RX ADMIN — DULOXETINE 20 MG: 20 CAPSULE, DELAYED RELEASE ORAL at 09:59

## 2022-06-12 RX ADMIN — WARFARIN SODIUM 2 MG: 2 TABLET ORAL at 17:55

## 2022-06-12 RX ADMIN — Medication 250 MG: at 17:55

## 2022-06-12 RX ADMIN — Medication 125 MG: at 05:58

## 2022-06-12 RX ADMIN — ALLOPURINOL 100 MG: 100 TABLET ORAL at 09:59

## 2022-06-12 RX ADMIN — TRIAMCINOLONE ACETONIDE: 1 CREAM TOPICAL at 10:00

## 2022-06-12 NOTE — ASSESSMENT & PLAN NOTE
· Does not appear to be on rate control  · INR therapeutic, continue coumadin  5 mg alternating with 2 mg  INR today is 3 01  2 mg of Coumadin was held yesterday  She is scheduled for 5 mg today  Will give 2 mg today  Check INR 6/13  Schedule dose would be 2 mg  Adjust accordingly

## 2022-06-12 NOTE — ASSESSMENT & PLAN NOTE
· Creatininine continues to trend upward, possibly due to overdiuresis    · Check urine studies, NA, OSM, Urea and Creatinine  · Held lasix, last given on 06/06  · Has received 2 L NS  · Currently receiving Plasma-Lyte at 100 mL/hr  · Nephrology recommendations appreciated

## 2022-06-12 NOTE — PLAN OF CARE
Problem: Potential for Falls  Goal: Patient will remain free of falls  Description: INTERVENTIONS:  - Educate patient/family on patient safety including physical limitations  - Instruct patient to call for assistance with activity   - Consult OT/PT to assist with strengthening/mobility   - Keep Call bell within reach  - Keep bed low and locked with side rails adjusted as appropriate  - Keep care items and personal belongings within reach  - Initiate and maintain comfort rounds  - Make Fall Risk Sign visible to staff  - Offer Toileting in advance of need  - Initiate/Maintain bed alarm  - Obtain necessary fall risk management equipment:   - Apply yellow socks and bracelet for high fall risk patients  - Consider moving patient to room near nurses station  Outcome: Progressing     Problem: PAIN - ADULT  Goal: Verbalizes/displays adequate comfort level or baseline comfort level  Description: Interventions:  - Encourage patient to monitor pain and request assistance  - Assess pain using appropriate pain scale  - Administer analgesics based on type and severity of pain and evaluate response  - Implement non-pharmacological measures as appropriate and evaluate response  - Consider cultural and social influences on pain and pain management  - Notify physician/advanced practitioner if interventions unsuccessful or patient reports new pain  Outcome: Progressing     Problem: INFECTION - ADULT  Goal: Absence or prevention of progression during hospitalization  Description: INTERVENTIONS:  - Assess and monitor for signs and symptoms of infection  - Monitor lab/diagnostic results  - Monitor all insertion sites, i e  indwelling lines, tubes, and drains  - Monitor endotracheal if appropriate and nasal secretions for changes in amount and color  - Marion appropriate cooling/warming therapies per order  - Administer medications as ordered  - Instruct and encourage patient and family to use good hand hygiene technique  - Identify and instruct in appropriate isolation precautions for identified infection/condition  Outcome: Progressing  Goal: Absence of fever/infection during neutropenic period  Description: INTERVENTIONS:  - Monitor WBC    Outcome: Progressing     Problem: SAFETY ADULT  Goal: Maintain or return to baseline ADL function  Description: INTERVENTIONS:  -  Assess patient's ability to carry out ADLs; assess patient's baseline for ADL function and identify physical deficits which impact ability to perform ADLs (bathing, care of mouth/teeth, toileting, grooming, dressing, etc )  - Assess/evaluate cause of self-care deficits   - Assess range of motion  - Assess patient's mobility; develop plan if impaired  - Assess patient's need for assistive devices and provide as appropriate  - Encourage maximum independence but intervene and supervise when necessary  - Involve family in performance of ADLs  - Assess for home care needs following discharge   - Consider OT consult to assist with ADL evaluation and planning for discharge  - Provide patient education as appropriate  Outcome: Progressing  Goal: Maintains/Returns to pre admission functional level  Description: INTERVENTIONS:  - Perform BMAT or MOVE assessment daily    - Set and communicate daily mobility goal to care team and patient/family/caregiver  - Collaborate with rehabilitation services on mobility goals if consulted  - Reposition patient every two hours    - Record patient progress and toleration of activity level   Outcome: Progressing     Problem: SKIN/TISSUE INTEGRITY - ADULT  Goal: Skin Integrity remains intact(Skin Breakdown Prevention)  Description: Assess:  -Perform Gucci assessment every shift  -Clean and moisturize skin   -Inspect skin when repositioning, toileting, and assisting with ADLS  -Assess under medical devices   -Assess extremities for adequate circulation and sensation     Bed Management:  -Have minimal linens on bed & keep smooth, unwrinkled  -Change linens as needed when moist or perspiring  -Avoid sitting or lying in one position while in bed    Toileting:  -Offer bedside commode  -Assess for incontinence   -Use incontinent care products after each incontinent episode    Activity:  -Encourage or provide ROM exercises   -Turn and reposition patient every two Hours  -Use appropriate equipment to lift or move patient in bed  -Instruct/ Assist with weight shifting when out of bed in chair  -Consider limitation of chair time     Skin Care:  -Avoid use of baby powder, tape, friction and shearing, hot water or constrictive clothing  -Relieve pressure over bony prominences  -Do not massage red bony areas    Next Steps:  -Teach patient strategies to minimize risks    -Consider consults to  interdisciplinary teams   Outcome: Progressing  Goal: Incision(s), wounds(s) or drain site(s) healing without S/S of infection  Description: INTERVENTIONS  - Assess and document dressing, incision, wound bed, drain sites and surrounding tissue  - Provide patient and family education  - Perform skin care/dressing changes per order  Outcome: Progressing  Goal: Pressure injury heals and does not worsen  Description: Interventions:  - Implement low air loss mattress or specialty surface (Criteria met)  - Apply silicone foam dressing  - Instruct/assist with weight shifting when in chair   - Limit chair time   - Use special pressure reducing interventions  when in chair   - Apply fecal or urinary incontinence containment device   - Perform passive or active ROM   - Turn and reposition patient & offload bony prominences every two hours   - Utilize friction reducing device or surface for transfers   - Consider consults to  interdisciplinary teams   - Use incontinent care products after each incontinent episode  - Consider nutrition services referral as needed  Outcome: Progressing     Problem: MOBILITY - ADULT  Goal: Maintain or return to baseline ADL function  Description: INTERVENTIONS:  - Assess patient's ability to carry out ADLs; assess patient's baseline for ADL function and identify physical deficits which impact ability to perform ADLs (bathing, care of mouth/teeth, toileting, grooming, dressing, etc )  - Assess/evaluate cause of self-care deficits   - Assess range of motion  - Assess patient's mobility; develop plan if impaired  - Assess patient's need for assistive devices and provide as appropriate  - Encourage maximum independence but intervene and supervise when necessary  - Involve family in performance of ADLs  - Assess for home care needs following discharge   - Consider OT consult to assist with ADL evaluation and planning for discharge  - Provide patient education as appropriate  Outcome: Progressing  Goal: Maintains/Returns to pre admission functional level  Description: INTERVENTIONS:  - Perform BMAT or MOVE assessment daily    - Set and communicate daily mobility goal to care team and patient/family/caregiver  - Collaborate with rehabilitation services on mobility goals if consulted  - Reposition patient every two hours    - Record patient progress and toleration of activity level   Outcome: Progressing

## 2022-06-12 NOTE — ASSESSMENT & PLAN NOTE
· Body mass index is 83 55 kg/m²     · Risk factor for recurrent infection and failure of oral antibiotics  · Continue supportive care and recommend outpatient evaluation

## 2022-06-12 NOTE — PROGRESS NOTES
Progress Note - Nephrology   Beatris Fitting 80 y o  female MRN: [id number]  Unit/Bed#: -01 Encounter: [id number]    A/P:  1  Acute kidney injury on top of chronic kidney disease                patient remains slightly prerenal, creatinine has drifted up to 2 3 mg/dL from 2 07 mg/dL  Will provide the patient with 2 additional L of isotonic saline for now, continue avoid potential nephrotoxins as we continue to worked optimize the patient's overall hemodynamics  2  Chronic kidney disease stage 2 with baseline creatinine between 1-1 1 mg/dL  3  Hypovolemic hyponatremia                continue to maximize volume expansion, serum sodium level okay at 134 millimole/L  4  Volume depletion                continue to provide volume expansion at this time     5  Panniculitis                it appears that the patient's antibiotic course has finished  Continue to monitor at this time  6  Severe morbid obesity with BMI of 82   7  Chronic diastolic congestive heart failure   Patient appears to be compensated, continue volume expansion as noted above  Anticipate potential re-initiation of diuretics as soon as Tuesday, June 14th      Follow up reason for today's visit:  Acute kidney injury/chronic kidney disease/hyponatremia/volume depletion    Panniculitis    Patient Active Problem List   Diagnosis    Other specified hypothyroidism    Mild episode of recurrent major depressive disorder ([facility] )    Idiopathic chronic 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    Primary osteoarthritis involving multiple joints    Class 3 severe obesity due to excess calories with serious comorbidity and body mass index (BMI) greater than or equal to 70 in adult [facility])    Chronic diastolic congestive heart failure (HCC)    Panniculitis    Gastroesophageal reflux disease without esophagitis    Lymphedema of extremity    Adjustment disorder    Ambulatory dysfunction    Anemia    Carpal tunnel syndrome    [facility]    Depression with anxiety    Gout    Hx MRSA infection    Hyperlipidemia    Irritable bowel syndrome    Left atrial enlargement    Left ovarian cyst    Left ventricular hypertrophy    Osteoarthritis    Symptomatic menopausal or female climacteric states    C  difficile colitis    History of Clostridioides difficile infection    Chronic atrial fibrillation (HCC)    Positive blood culture    BMI 70 and over, adult ([facility]    Abdominal wall cellulitis    Acute pain of left shoulder         Subjective:   No acute events overnight, patient is eating and drinking appropriately  Objective:     Vitals: Blood pressure 122/64, pulse 64, temperature 98 2 °F (36 8 °C), resp  rate 18, height 5' 2" (1 575 m), weight (!) 207 kg (456 lb 12 7 oz), SpO2 96 %  ,Body mass index is 83 55 kg/m²  Weight (last 2 days)     Date/Time Weight    06/12/22 0600 207 (456 79)    06/11/22 0600 206 (453 93)    06/10/22 0600 205 (451 28)            Intake/Output Summary (Last 24 hours) at 6/12/2022 1132  Last data filed at 6/11/2022 1730  Gross per 24 hour   Intake 725 ml   Output 325 ml   Net 400 ml     I/O last 3 completed shifts: In: 725 [P O :240;  I V :485]  Out: 445 [Urine:445]         Physical Exam: /64   Pulse 64   Temp 98 2 °F (36 8 °C)   Resp 18   Ht 5' 2" (1 575 m)   Wt (!) 207 kg (456 lb 12 7 oz)   LMP  (LMP Unknown)   SpO2 96%   BMI 83 55 kg/m²     General Appearance:    Alert, cooperative, no distress, appears stated age   Head:    Normocephalic, without obvious abnormality, atraumatic   Eyes:    Conjunctiva/corneas clear   Ears:    Normal external ears   Nose:   Nares normal, septum midline, mucosa normal, no drainage    or sinus tenderness   Throat:   Lips, mucosa, and tongue normal; teeth and gums normal   Neck:   Supple   Back:     Symmetric, no curvature, ROM normal, no CVA tenderness   Lungs:     Clear to auscultation bilaterally, respirations unlabored   Chest wall: No tenderness or deformity   Heart:    Regular rate and rhythm, S1 and S2 normal, no murmur, rub   or gallop   Abdomen:     Soft, non-tender, bowel sounds active   Extremities:   Extremities normal, atraumatic, no cyanosis, minimal to no bilateral lower extremity edema   Skin:   Skin color, texture, turgor normal, no rashes or lesions   Lymph nodes:   Cervical normal   Neurologic:   CNII-XII intact            Lab, Imaging and other studies: I have personally reviewed pertinent labs  CBC:   Lab Results   Component Value Date    WBC 10 44 (H) 06/12/2022    HGB 10 0 (L) 06/12/2022    HCT 34 0 (L) 06/12/2022    MCV 84 06/12/2022     06/12/2022    MCH 24 6 (L) 06/12/2022    MCHC 29 4 (L) 06/12/2022    RDW 16 8 (H) 06/12/2022    MPV 8 6 (L) 06/12/2022    NRBC 0 06/12/2022     CMP:   Lab Results   Component Value Date    K 4 7 06/12/2022    CL 98 06/12/2022    CO2 26 06/12/2022    BUN 61 (H) 06/12/2022    CREATININE 2 30 (H) 06/12/2022    CALCIUM 8 9 06/12/2022    EGFR 20 06/12/2022         Results from last 7 days   Lab Units 06/12/22  0534 06/11/22  0548 06/10/22  0431   POTASSIUM mmol/L 4 7 4 4 4 2   CHLORIDE mmol/L 98 97 97   CO2 mmol/L 26 27 27   BUN mg/dL 61* 58* 50*   CREATININE mg/dL 2 30* 2 07* 1 79*   CALCIUM mg/dL 8 9 8 5 8 9         Phosphorus: No results found for: PHOS  Magnesium: No results found for: MG  Urinalysis: No results found for: COLORU, CLARITYU, SPECGRAV, PHUR, LEUKOCYTESUR, NITRITE, PROTEINUA, GLUCOSEU, KETONESU, BILIRUBINUR, BLOODU  Ionized Calcium: No results found for: CAION  Coagulation:   Lab Results   Component Value Date    INR 3 01 (H) 06/12/2022     Troponin: No results found for: TROPONINI  ABG: No results found for: PHART, QWA3CVW, PO2ART, OOH5OQT, U2KIKVZK, BEART, SOURCE  Radiology review:     IMAGING  No results found      Current Facility-Administered Medications   Medication Dose Route Frequency    acetaminophen (TYLENOL) tablet 650 mg  650 mg Oral Q4H PRN    allopurinol (ZYLOPRIM) tablet 100 mg  100 mg Oral Daily    DULoxetine (CYMBALTA) delayed release capsule 20 mg  20 mg Oral Daily    levothyroxine tablet 125 mcg  125 mcg Oral Daily    multi-electrolyte (PLASMALYTE-A/ISOLYTE-S PH 7 4) IV solution  125 mL/hr Intravenous Continuous    ondansetron (ZOFRAN) injection 4 mg  4 mg Intravenous Q6H PRN    oxyCODONE (ROXICODONE) IR tablet 5 mg  5 mg Oral Q6H PRN    saccharomyces boulardii (FLORASTOR) capsule 250 mg  250 mg Oral BID    triamcinolone (KENALOG) 0 1 % cream   Topical BID    vancomycin (VANCOCIN) oral solution 125 mg  125 mg Oral Q6H [address]    [START ON 6/14/2022] warfarin (COUMADIN) tablet 5 mg  5 mg Oral Every Other Day    And    [START ON 6/13/2022] warfarin (COUMADIN) tablet 2 mg  2 mg Oral Every Other Day    warfarin (COUMADIN) tablet 2 mg  2 mg Oral Once (warfarin)     Medications Discontinued During This Encounter   Medication Reason    vancomycin (VANCOCIN) 1,750 mg in sodium chloride 0 9 % 500 mL IVPB     nystatin (MYCOSTATIN) powder     warfarin (COUMADIN) tablet 4 mg     warfarin (COUMADIN) tablet 2 mg     warfarin (COUMADIN) tablet 4 mg     warfarin (COUMADIN) tablet 2 mg     enoxaparin (LOVENOX) subcutaneous injection 60 mg     vancomycin (VANCOCIN) 2,000 mg in sodium chloride 0 9 % 500 mL IVPB     cefepime (MAXIPIME) injection 2,000 mg     vancomycin (VANCOCIN) 1500 mg in sodium chloride 0 9% 250 mL IVPB     vancomycin (VANCOCIN) 1500 mg in sodium chloride 0 9% 250 mL IVPB     vancomycin (VANCOCIN) 1500 mg in sodium chloride 0 9% 250 mL IVPB     vancomycin (VANCOCIN) 1500 mg in sodium chloride 0 9% 250 mL IVPB     vancomycin (VANCOCIN) 1500 mg in sodium chloride 0 9% 250 mL IVPB     furosemide (LASIX) injection 40 mg     warfarin (COUMADIN) tablet 5 mg     warfarin (COUMADIN) tablet 2 mg     furosemide (LASIX) tablet 40 mg     furosemide (LASIX) tablet 40 mg     levofloxacin (LEVAQUIN) tablet 750 mg     sodium chloride 0 9 % infusion  sodium chloride 0 9 % infusion     warfarin (COUMADIN) tablet 5 mg     warfarin (COUMADIN) tablet 2 mg     warfarin (COUMADIN) tablet 2 mg     warfarin (COUMADIN) tablet 5 mg        Reji Birmingham, DO      This progress note was produced in part using a dictation device which may document imprecise wording from author's original intent

## 2022-06-12 NOTE — ASSESSMENT & PLAN NOTE
81-year-old female with morbid obesity and diastolic CHF recently hospitalized at [facility] who re-presented for recurrent cellulitis of pannus in setting of morbid obesity, chronic lymphedema, increased moisture and superficial skin breakdown  · Wound culture with pseudomonas and proteus  · Cefepime 2gm q8 completed on 6/10   · PO vanc for c diff through 06/12 for prophylaxis  · Surgery evaluated, no surgical intervention or abscess noted  · Continue aggressive skin care with minimizing friction and moisture related skin breakdown   Frequent offloading and repositioning  · Recommend that patient be evaluated outpatient by plastic surgery outpatient for chronic lymphedema, and bariatrics

## 2022-06-12 NOTE — PROGRESS NOTES
[facility] [address]  Progress Note - Ria Rushing 1950, 80 y o  female MRN: [id number]  Unit/Bed#: -01 Encounter: [id number]  Primary Care Provider: ORACIO Meek   Date and time admitted to hospital: 5/27/2022  7:52 PM    * Panniculitis  Assessment & Plan  43-year-old female with morbid obesity and diastolic CHF recently hospitalized at [facility] who re-presented for recurrent cellulitis of pannus in setting of morbid obesity, chronic lymphedema, increased moisture and superficial skin breakdown  · Wound culture with pseudomonas and proteus  · Cefepime 2gm q8 completed on 6/10   · PO vanc for c diff through 06/12 for prophylaxis  · Surgery evaluated, no surgical intervention or abscess noted  · Continue aggressive skin care with minimizing friction and moisture related skin breakdown  Frequent offloading and repositioning  · Recommend that patient be evaluated outpatient by plastic surgery outpatient for chronic lymphedema, and bariatrics      History of Clostridioides difficile infection  Assessment & Plan  · Continue prophylactic oral vancomycin for 72 hours beyond systemic antibiotics (end 6/12)  · Continue probiotics    Acute kidney injury superimposed on CKD ([facility] [address] )  Assessment & Plan  · Creatininine continues to trend upward, possibly due to overdiuresis  · Check urine studies, NA, OSM, Urea and Creatinine  · Held lasix, last given on 06/06  · Has received 2 L NS  · Currently receiving Plasma-Lyte at 100 mL/hr  · Nephrology recommendations appreciated    Acute pain of left shoulder  Assessment & Plan  · Patient reports atraumatic left shoulder pain x3 days, and she asked for stronger pain medicine yesterday (was given oxycodone x 1)  · Left shoulder x-ray:  No obvious osseous abnormality  Radiology read is pending    · Oxycodone every 6 hours as needed    BMI 70 and over, adult [facility])  Assessment & Plan  · Continue supportive care  · Risk factor for recurrent infections    Positive blood culture  Assessment & Plan  · Positive blood culture may be contaminant as is coag negative staph  · Does have history of MRSE bacteremia  · Appreciate infectious disease evaluation  · Low suspicion for true bacteremia and endovascular infection  · Completed vancomycin until 06/05    Chronic atrial fibrillation ([address] )  Assessment & Plan  · Does not appear to be on rate control  · INR therapeutic, continue coumadin  5 mg alternating with 2 mg  INR today is 3 01  2 mg of Coumadin was held yesterday  She is scheduled for 5 mg today  Will give 2 mg today  Check INR 6/13  Schedule dose would be 2 mg  Adjust accordingly  Gout  Assessment & Plan  · Continue allopurinol    Depression with anxiety  Assessment & Plan  · Mood stable   · Continue duloxetine    Lymphedema of extremity  Assessment & Plan  · Chronic lymphedema with panniculitis  · Treated with six days of IV Lasix to expedite diuresis  · Serial assessments    Chronic diastolic congestive heart failure (HCC)  Assessment & Plan  Wt Readings from Last 3 Encounters:   06/12/22 (!) 207 kg (456 lb 12 7 oz)   05/08/22 (!) 212 kg (467 lb)   05/06/22 (!) 193 kg (425 lb)     · Diuresed for lower extremity swelling with IV Lasix  · Monitor BMP, diuretics on hold at this time  · Continue fluid restriction, low-sodium diet    Class 3 severe obesity due to excess calories with serious comorbidity and body mass index (BMI) greater than or equal to 70 in [facility])  Assessment & Plan  · Body mass index is 83 55 kg/m²  · Risk factor for recurrent infection and failure of oral antibiotics  · Continue supportive care and recommend outpatient evaluation    Other specified hypothyroidism  Assessment & Plan  · Continue levothyroxine    VTE Pharmacologic Prophylaxis:   Pharmacologic: Warfarin (Coumadin)  Mechanical VTE Prophylaxis in Place: Yes    Patient Centered Rounds: I have performed bedside rounds with nursing staff today      Education and Discussions with Family / Patient:  Patient    Time Spent for Care: 30 minutes  More than 50% of total time spent on counseling and coordination of care as described above  Current Length of Stay: 16 day(s)    Current Patient Status: Inpatient   Certification Statement: The patient will continue to require additional inpatient hospital stay due to LARRY    Discharge Plan: To be determined    Code Status: Level 1 - Full Code      Subjective:   Patient seen and examined  She says that her left shoulder pain is a little better with the oxycodone  No other complaints offered  Objective:     Vitals:   Temp (24hrs), Av 8 °F (36 6 °C), Min:97 6 °F (36 4 °C), Max:98 2 °F (36 8 °C)    Temp:  [97 6 °F (36 4 °C)-98 2 °F (36 8 °C)] 98 2 °F (36 8 °C)  HR:  [54-64] 64  Resp:  [18-20] 18  BP: (106-122)/(60-64) 122/64  SpO2:  [96 %-100 %] 96 %  Body mass index is 83 55 kg/m²  Input and Output Summary (last 24 hours): Intake/Output Summary (Last 24 hours) at 2022 0931  Last data filed at 2022 1730  Gross per 24 hour   Intake 725 ml   Output 445 ml   Net 280 ml       Physical Exam:     Physical Exam  Vitals and nursing note reviewed  Constitutional:       General: She is not in acute distress  Appearance: She is obese  HENT:      Head: Normocephalic  Mouth/Throat:      Mouth: Mucous membranes are moist       Pharynx: Oropharynx is clear  Eyes:      Extraocular Movements: Extraocular movements intact  Pupils: Pupils are equal, round, and reactive to light  Cardiovascular:      Rate and Rhythm: Normal rate  Rhythm irregular  Pulses: Normal pulses  Pulmonary:      Effort: Pulmonary effort is normal       Breath sounds: Normal breath sounds  Abdominal:      General: Abdomen is flat  Bowel sounds are normal       Palpations: Abdomen is soft  Musculoskeletal:         General: Normal range of motion  Cervical back: Normal range of motion and neck supple     Skin: General: Skin is warm and dry  Capillary Refill: Capillary refill takes less than 2 seconds  Comments: Left pannus with skin changes but no longer erythematous  Right pannus with skin changes but no erythema  Neurological:      General: No focal deficit present  Mental Status: She is alert and oriented to person, place, and time  Additional Data:     Labs:    Results from last 7 days   Lab Units 06/12/22  0534   WBC Thousand/uL 10 44*   HEMOGLOBIN g/dL 10 0*   HEMATOCRIT % 34 0*   PLATELETS Thousands/uL 367   NEUTROS PCT % 81*   LYMPHS PCT % 10*   MONOS PCT % 6   EOS PCT % 2     Results from last 7 days   Lab Units 06/12/22  0534   SODIUM mmol/L 134*   POTASSIUM mmol/L 4 7   CHLORIDE mmol/L 98   CO2 mmol/L 26   BUN mg/dL 61*   CREATININE mg/dL 2 30*   ANION GAP mmol/L 10   CALCIUM mg/dL 8 9   GLUCOSE RANDOM mg/dL 98     Results from last 7 days   Lab Units 06/12/22  0534   INR  3 01*                       * I Have Reviewed All Lab Data Listed Above  * Additional Pertinent Lab Tests Reviewed:  [facility] Admission Reviewed    Imaging:    Imaging Reports Reviewed Today Include:  Report pending  Imaging Personally Reviewed by Myself Includes:  Left shoulder x-ray    Recent Cultures (last 7 days):           Last 24 Hours Medication List:   Current Facility-Administered Medications   Medication Dose Route Frequency Provider Last Rate    acetaminophen  650 mg Oral Q4H PRN Benja Rice PA-C      allopurinol  100 mg Oral Daily [place], [place]      DULoxetine  20 mg Oral Daily [place], [place]      levothyroxine  125 mcg Oral Daily [place], [place]      multi-electrolyte  125 mL/hr Intravenous Continuous Latosha Pradhan DO      ondansetron  4 mg Intravenous Q6H PRN Benja Rice PA-C      oxyCODONE  5 mg Oral Q6H PRN ORACIO Marinelli      saccharomyces boulardii  250 mg Oral BID Perfecto Patee, MYA      triamcinolone   Topical BID Benja Rice PA-C      vancomycin  125 mg Oral Q6H [address] [facility] Britni Pak MD      [START ON 6/14/2022] warfarin  5 mg Oral Every Other Day ORAICO Marinelli      And    [START ON 6/13/2022] warfarin  2 mg Oral Every Other Day ORACIO Marinelli      warfarin  2 mg Oral Once (warfarin) ORACIO Marinelli          Today, Patient Was Seen By: ORACIO Marinelli    ** Please Note: Dictation voice to text software may have been used in the creation of this document   **

## 2022-06-12 NOTE — ASSESSMENT & PLAN NOTE
Wt Readings from Last 3 Encounters:   06/12/22 (!) 207 kg (456 lb 12 7 oz)   05/08/22 (!) 212 kg (467 lb)   05/06/22 (!) 193 kg (425 lb)     · Diuresed for lower extremity swelling with IV Lasix  · Monitor BMP, diuretics on hold at this time  · Continue fluid restriction, low-sodium diet

## 2022-06-12 NOTE — ASSESSMENT & PLAN NOTE
· Patient reports atraumatic left shoulder pain x3 days, and she asked for stronger pain medicine yesterday (was given oxycodone x 1)  · Left shoulder x-ray:  No obvious osseous abnormality  Radiology read is pending    · Oxycodone every 6 hours as needed

## 2022-06-13 LAB
ANION GAP SERPL CALCULATED.3IONS-SCNC: 11 MMOL/L (ref 4–13)
BASOPHILS # BLD AUTO: 0.05 THOUSANDS/ΜL (ref 0–0.1)
BASOPHILS NFR BLD AUTO: 1 % (ref 0–1)
BUN SERPL-MCNC: 64 MG/DL (ref 5–25)
CALCIUM SERPL-MCNC: 8.7 MG/DL (ref 8.4–10.2)
CHLORIDE SERPL-SCNC: 98 MMOL/L (ref 96–108)
CO2 SERPL-SCNC: 26 MMOL/L (ref 21–32)
CREAT SERPL-MCNC: 2.37 MG/DL (ref 0.6–1.3)
EOSINOPHIL # BLD AUTO: 0.28 THOUSAND/ΜL (ref 0–0.61)
EOSINOPHIL NFR BLD AUTO: 3 % (ref 0–6)
ERYTHROCYTE [DISTWIDTH] IN BLOOD BY AUTOMATED COUNT: 16.8 % (ref 11.6–15.1)
GFR SERPL CREATININE-BSD FRML MDRD: 20 ML/MIN/1.73SQ M
GLUCOSE SERPL-MCNC: 88 MG/DL (ref 65–140)
HCT VFR BLD AUTO: 33.3 % (ref 34.8–46.1)
HGB BLD-MCNC: 9.8 G/DL (ref 11.5–15.4)
IMM GRANULOCYTES # BLD AUTO: 0.03 THOUSAND/UL (ref 0–0.2)
IMM GRANULOCYTES NFR BLD AUTO: 0 % (ref 0–2)
INR PPP: 2.73 (ref 0.84–1.19)
LYMPHOCYTES # BLD AUTO: 1.03 THOUSANDS/ΜL (ref 0.6–4.47)
LYMPHOCYTES NFR BLD AUTO: 12 % (ref 14–44)
MCH RBC QN AUTO: 24.2 PG (ref 26.8–34.3)
MCHC RBC AUTO-ENTMCNC: 29.4 G/DL (ref 31.4–37.4)
MCV RBC AUTO: 82 FL (ref 82–98)
MONOCYTES # BLD AUTO: 0.5 THOUSAND/ΜL (ref 0.17–1.22)
MONOCYTES NFR BLD AUTO: 6 % (ref 4–12)
NEUTROPHILS # BLD AUTO: 7 THOUSANDS/ΜL (ref 1.85–7.62)
NEUTS SEG NFR BLD AUTO: 78 % (ref 43–75)
NRBC BLD AUTO-RTO: 0 /100 WBCS
PLATELET # BLD AUTO: 371 THOUSANDS/UL (ref 149–390)
PMV BLD AUTO: 8.6 FL (ref 8.9–12.7)
POTASSIUM SERPL-SCNC: 4.6 MMOL/L (ref 3.5–5.3)
PROTHROMBIN TIME: 28.2 SECONDS (ref 11.6–14.5)
RBC # BLD AUTO: 4.05 MILLION/UL (ref 3.81–5.12)
SODIUM SERPL-SCNC: 135 MMOL/L (ref 135–147)
WBC # BLD AUTO: 8.89 THOUSAND/UL (ref 4.31–10.16)

## 2022-06-13 PROCEDURE — 99232 SBSQ HOSP IP/OBS MODERATE 35: CPT | Performed by: INTERNAL MEDICINE

## 2022-06-13 PROCEDURE — 80048 BASIC METABOLIC PNL TOTAL CA: CPT | Performed by: NURSE PRACTITIONER

## 2022-06-13 PROCEDURE — 85610 PROTHROMBIN TIME: CPT | Performed by: NURSE PRACTITIONER

## 2022-06-13 PROCEDURE — 99232 SBSQ HOSP IP/OBS MODERATE 35: CPT | Performed by: NURSE PRACTITIONER

## 2022-06-13 PROCEDURE — 85025 COMPLETE CBC W/AUTO DIFF WBC: CPT | Performed by: NURSE PRACTITIONER

## 2022-06-13 RX ADMIN — OXYCODONE HYDROCHLORIDE 5 MG: 5 TABLET ORAL at 12:45

## 2022-06-13 RX ADMIN — Medication 250 MG: at 09:48

## 2022-06-13 RX ADMIN — DULOXETINE 20 MG: 20 CAPSULE, DELAYED RELEASE ORAL at 09:49

## 2022-06-13 RX ADMIN — ALLOPURINOL 100 MG: 100 TABLET ORAL at 09:48

## 2022-06-13 RX ADMIN — LEVOTHYROXINE SODIUM 125 MCG: 25 TABLET ORAL at 09:48

## 2022-06-13 RX ADMIN — Medication 250 MG: at 18:09

## 2022-06-13 RX ADMIN — TRIAMCINOLONE ACETONIDE: 1 CREAM TOPICAL at 10:00

## 2022-06-13 RX ADMIN — WARFARIN SODIUM 2 MG: 2 TABLET ORAL at 18:09

## 2022-06-13 NOTE — ASSESSMENT & PLAN NOTE
· 5/27 Positive blood culture 1/2 set- likely contaminant   · 6/3 repeat blood cultures- negative   · Does have history of MRSE bacteremia  · Appreciate infectious disease evaluation    · Low suspicion for true bacteremia and endovascular infection  · Completed antibiotic

## 2022-06-13 NOTE — PROGRESS NOTES
Progress Note - Nephrology   Jorge Matthew 80 y o  female MRN: [id number]  Unit/Bed#: -01 Encounter: [id number]    A/P:  1  Acute kidney injury on top of chronic kidney disease                 creatinine stable at about 2 3 mg/dL  Will continue the patient without additional isotonic saline volume expansion at this time  Follow-up blood work in the morning  2  Chronic kidney disease stage 2 with baseline creatinine between 1-1 1 mg/dL  3  Hypovolemic hyponatremia                 stable, continue to monitor  4  Volume depletion                 will hold additional volume expansion at this time and see how the patient does  Anticipate improvement in creatinine by tomorrow, June 14th     5  Panniculitis                 patient is status post antibiotic treatment, continue with local care if indicated  6  Severe morbid obesity with BMI of 82   7  Chronic diastolic congestive heart failure   Patient appears to be compensated, continue monitor for now      Follow up reason for today's visit:  Acute kidney injury/chronic kidney disease/hyponatremia    Panniculitis    Patient Active Problem List   Diagnosis    Other specified hypothyroidism    Mild episode of recurrent major depressive disorder ([facility] [address] )    Idiopathic chronic 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    Primary osteoarthritis involving multiple joints    Class 3 severe obesity due to excess calories with serious comorbidity and body mass index (BMI) greater than or equal to 70 in adult [facility])    Chronic diastolic congestive heart failure (HCC)    Panniculitis    Gastroesophageal reflux disease without esophagitis    Lymphedema of extremity    Adjustment disorder    Ambulatory dysfunction    Anemia    Carpal tunnel syndrome    Acute kidney injury superimposed on CKD ([facility] [address] )    Depression with anxiety    Gout    Hx MRSA infection    Hyperlipidemia    Irritable bowel syndrome    Left atrial enlargement    Left ovarian cyst    Left ventricular hypertrophy    Osteoarthritis    Symptomatic menopausal or female climacteric states    C  difficile colitis    History of Clostridioides difficile infection    Chronic atrial fibrillation (HCC)    Positive blood culture    BMI 70 and over, adult ([facility] [address] )    Abdominal wall cellulitis    Acute pain of left shoulder         Subjective:   No acute events overnight, patient denies nausea vomiting, eating well  Objective:     Vitals: Blood pressure 124/67, pulse 67, temperature 97 6 °F (36 4 °C), resp  rate 17, height 5' 2" (1 575 m), weight (!) 210 kg (462 lb 4 9 oz), SpO2 98 %  ,Body mass index is 84 56 kg/m²  Weight (last 2 days)     Date/Time Weight    06/13/22 0549 210 (462 3)    06/12/22 0600 207 (456 79)    06/11/22 0600 206 (453 93)            Intake/Output Summary (Last 24 hours) at 6/13/2022 1159  Last data filed at 6/13/2022 0900  Gross per 24 hour   Intake 240 ml   Output --   Net 240 ml     No intake/output data recorded           Physical Exam: /67   Pulse 67   Temp 97 6 °F (36 4 °C)   Resp 17   Ht 5' 2" (1 575 m)   Wt (!) 210 kg (462 lb 4 9 oz)   LMP  (LMP Unknown)   SpO2 98%   BMI 84 56 kg/m²     General Appearance:    Alert, cooperative, no distress, appears stated age   Head:    Normocephalic, without obvious abnormality, atraumatic   Eyes:    Conjunctiva/corneas clear   Ears:    Normal external ears   Nose:   Nares normal, septum midline, mucosa normal, no drainage    or sinus tenderness   Throat:   Lips, mucosa, and tongue normal; teeth and gums normal   Neck:   Supple   Back:     Symmetric, no curvature, ROM normal, no CVA tenderness   Lungs:     Clear to auscultation bilaterally, respirations unlabored   Chest wall:    No tenderness or deformity   Heart:    Regular rate and rhythm, S1 and S2 normal, no murmur, rub   or gallop   Abdomen:     Soft, non-tender, bowel sounds active   Extremities:   Extremities normal, atraumatic, no cyanosis or edema   Skin: Skin color, texture, turgor normal, no rashes or lesions   Lymph nodes:   Cervical normal   Neurologic:   CNII-XII intact            Lab, Imaging and other studies: I have personally reviewed pertinent labs  CBC:   Lab Results   Component Value Date    WBC 8 89 06/13/2022    HGB 9 8 (L) 06/13/2022    HCT 33 3 (L) 06/13/2022    MCV 82 06/13/2022     06/13/2022    MCH 24 2 (L) 06/13/2022    MCHC 29 4 (L) 06/13/2022    RDW 16 8 (H) 06/13/2022    MPV 8 6 (L) 06/13/2022    NRBC 0 06/13/2022     CMP:   Lab Results   Component Value Date    K 4 6 06/13/2022    CL 98 06/13/2022    CO2 26 06/13/2022    BUN 64 (H) 06/13/2022    CREATININE 2 37 (H) 06/13/2022    CALCIUM 8 7 06/13/2022    EGFR 20 06/13/2022         Results from last 7 days   Lab Units 06/13/22  0508 06/12/22  0534 06/11/22  0548   POTASSIUM mmol/L 4 6 4 7 4 4   CHLORIDE mmol/L 98 98 97   CO2 mmol/L 26 26 27   BUN mg/dL 64* 61* 58*   CREATININE mg/dL 2 37* 2 30* 2 07*   CALCIUM mg/dL 8 7 8 9 8 5         Phosphorus: No results found for: PHOS  Magnesium: No results found for: MG  Urinalysis: No results found for: Verlie Fothergill, SPECGRAV, PHUR, LEUKOCYTESUR, NITRITE, PROTEINUA, GLUCOSEU, KETONESU, BILIRUBINUR, BLOODU  Ionized Calcium: No results found for: CAION  Coagulation:   Lab Results   Component Value Date    INR 2 73 (H) 06/13/2022     Troponin: No results found for: TROPONINI  ABG: No results found for: PHART, WFE6PYF, PO2ART, QEZ0DPY, Z3YDSPKV, BEART, SOURCE  Radiology review:     IMAGING  Procedure: XR shoulder 2+ vw left    Result Date: 6/13/2022  Narrative: LEFT SHOULDER INDICATION:   pain  COMPARISON:  None VIEWS:  XR SHOULDER 2+ VW LEFT FINDINGS: There is no acute fracture or dislocation  Moderate osteoarthritis of the glenohumeral and acromioclavicular joints  No lytic or blastic osseous lesion  Soft tissues are unremarkable  Impression: No acute osseous abnormality   Workstation performed: [id number]       Current Facility-Administered Medications   Medication Dose Route Frequency    acetaminophen (TYLENOL) tablet 650 mg  650 mg Oral Q4H PRN    allopurinol (ZYLOPRIM) tablet 100 mg  100 mg Oral Daily    DULoxetine (CYMBALTA) delayed release capsule 20 mg  20 mg Oral Daily    levothyroxine tablet 125 mcg  125 mcg Oral Daily    ondansetron (ZOFRAN) injection 4 mg  4 mg Intravenous Q6H PRN    oxyCODONE (ROXICODONE) IR tablet 5 mg  5 mg Oral Q6H PRN    saccharomyces boulardii (FLORASTOR) capsule 250 mg  250 mg Oral BID    triamcinolone (KENALOG) 0 1 % cream   Topical BID    [START ON 6/14/2022] warfarin (COUMADIN) tablet 5 mg  5 mg Oral Every Other Day    And    warfarin (COUMADIN) tablet 2 mg  2 mg Oral Every Other Day     Medications Discontinued During This Encounter   Medication Reason    vancomycin (VANCOCIN) 1,750 mg in sodium chloride 0 9 % 500 mL IVPB     nystatin (MYCOSTATIN) powder     warfarin (COUMADIN) tablet 4 mg     warfarin (COUMADIN) tablet 2 mg     warfarin (COUMADIN) tablet 4 mg     warfarin (COUMADIN) tablet 2 mg     enoxaparin (LOVENOX) subcutaneous injection 60 mg     vancomycin (VANCOCIN) 2,000 mg in sodium chloride 0 9 % 500 mL IVPB     cefepime (MAXIPIME) injection 2,000 mg     vancomycin (VANCOCIN) 1500 mg in sodium chloride 0 9% 250 mL IVPB     vancomycin (VANCOCIN) 1500 mg in sodium chloride 0 9% 250 mL IVPB     vancomycin (VANCOCIN) 1500 mg in sodium chloride 0 9% 250 mL IVPB     vancomycin (VANCOCIN) 1500 mg in sodium chloride 0 9% 250 mL IVPB     vancomycin (VANCOCIN) 1500 mg in sodium chloride 0 9% 250 mL IVPB     furosemide (LASIX) injection 40 mg     warfarin (COUMADIN) tablet 5 mg     warfarin (COUMADIN) tablet 2 mg     furosemide (LASIX) tablet 40 mg     furosemide (LASIX) tablet 40 mg     levofloxacin (LEVAQUIN) tablet 750 mg     sodium chloride 0 9 % infusion     sodium chloride 0 9 % infusion     warfarin (COUMADIN) tablet 5 mg     warfarin (COUMADIN) tablet 2 mg     warfarin (COUMADIN) tablet 2 mg     warfarin (COUMADIN) tablet 5 mg        Tamsen Saint, DO      This progress note was produced in part using a dictation device which may document imprecise wording from author's original intent

## 2022-06-13 NOTE — ASSESSMENT & PLAN NOTE
· Does not appear to be on rate control  · INR therapeutic, continue coumadin  5 mg alternating with 2 mg

## 2022-06-13 NOTE — ASSESSMENT & PLAN NOTE
Recently hospitalized at [facility] who re-presented for recurrent cellulitis of pannus in setting of morbid obesity, chronic lymphedema, increased moisture and superficial skin breakdown  · Wound culture with pseudomonas and proteus  · Cefepime 2gm q8 completed on 6/10 with PO vancomycin prophylaxis for C diff completed on 6/13  · Surgery input appreciated- no surgical intervention or abscess noted  · Continue aggressive skin care with minimizing friction and moisture related skin breakdown   Frequent offloading and repositioning  · Recommend that patient be evaluated by outpatient for chronic lymphedema

## 2022-06-13 NOTE — ASSESSMENT & PLAN NOTE
Wt Readings from Last 3 Encounters:   06/13/22 (!) 210 kg (462 lb 4 9 oz)   05/08/22 (!) 212 kg (467 lb)   05/06/22 (!) 193 kg (425 lb)     · Diuresed for lower extremity swelling with IV Lasix  · Monitor BMP, diuretics on hold at this time due to LARRY   · Continue fluid restriction, low-sodium diet

## 2022-06-13 NOTE — PROGRESS NOTES
[address]  Progress Note - Fili Shine 1950, 80 y o  female MRN: [id number]  Unit/Bed#: -01 Encounter: [id number]  Primary Care Provider: ORACIO Pineda   Date and time admitted to hospital: 5/27/2022  7:52 PM    * Panniculitis  Assessment & Plan  Recently hospitalized at [facility] who re-presented for recurrent cellulitis of pannus in setting of morbid obesity, chronic lymphedema, increased moisture and superficial skin breakdown  · Wound culture with pseudomonas and proteus  · Cefepime 2gm q8 completed on 6/10 with PO vancomycin prophylaxis for C diff completed on 6/13  · Surgery input appreciated- no surgical intervention or abscess noted  · Continue aggressive skin care with minimizing friction and moisture related skin breakdown  Frequent offloading and repositioning  · Recommend that patient be evaluated by outpatient for chronic lymphedema    BMI 70 and over, adult [facility])  Assessment & Plan  · Continue supportive care  · Risk factor for recurrent infections     Positive blood culture  Assessment & Plan  · 5/27 Positive blood culture 1/2 set- likely contaminant   · 6/3 repeat blood cultures- negative   · Does have history of MRSE bacteremia  · Appreciate infectious disease evaluation  · Low suspicion for true bacteremia and endovascular infection  · Completed antibiotic     Chronic atrial fibrillation ([facility] [address] )  Assessment & Plan  · Does not appear to be on rate control  · INR therapeutic, continue coumadin  5 mg alternating with 2 mg  History of Clostridioides difficile infection  Assessment & Plan  · Completed p o  Vancomycin prophylaxis  · Continue probiotics    Acute kidney injury superimposed on CKD ([address] )  Assessment & Plan  · Baseline creatinine appears to be between 1-1 1 mg/dL  · Creatininine continues to trend upward, possibly due to overdiuresis    · Urine studies reviewed   · Held lasix, last given on 06/06  · Nephrology input appreciated · Cr improving after IVF       Lymphedema of extremity  Assessment & Plan  · Chronic lymphedema with panniculitis  · Treated with IV Lasix   · Serial assessments    Chronic diastolic congestive heart failure (HCC)  Assessment & Plan  Wt Readings from Last 3 Encounters:   22 (!) 210 kg (462 lb 4 9 oz)   22 (!) 212 kg (467 lb)   22 (!) 193 kg (425 lb)     · Diuresed for lower extremity swelling with IV Lasix  · Monitor BMP, diuretics on hold at this time due to LARRY   · Continue fluid restriction, low-sodium diet        VTE Prophylaxis:  Warfarin (Coumadin)    Patient Centered Rounds: I have performed bedside rounds with nursing staff today  Discussions with Specialists or Other Care Team Provider: n/a   Education and Discussions with Family / Patient: patient     Current Length of Stay: 17 day(s)    Current Patient Status: Inpatient   Certification Statement: The patient will continue to require additional inpatient hospital stay due to panniculitis     Discharge Plan: discharge in AM     Code Status: Level 1 - Full Code    Subjective:   Feeling better  Denies any pain currently     Objective:     Vitals:   Temp (24hrs), Av 5 °F (36 4 °C), Min:97 3 °F (36 3 °C), Max:97 7 °F (36 5 °C)    Temp:  [97 3 °F (36 3 °C)-97 7 °F (36 5 °C)] 97 6 °F (36 4 °C)  HR:  [61-72] 67  Resp:  [17-18] 17  BP: (107-124)/(60-67) 124/67  SpO2:  [97 %-99 %] 97 %  Body mass index is 84 56 kg/m²  Input and Output Summary (last 24 hours): Intake/Output Summary (Last 24 hours) at 2022 1306  Last data filed at 2022 1100  Gross per 24 hour   Intake 480 ml   Output --   Net 480 ml       Physical Exam:   Physical Exam  Vitals and nursing note reviewed  Constitutional:       General: She is not in acute distress  Appearance: Normal appearance  She is obese  HENT:      Head: Normocephalic and atraumatic        Right Ear: External ear normal       Left Ear: External ear normal       Nose: Nose normal  No rhinorrhea  Mouth/Throat:      Mouth: Mucous membranes are moist       Pharynx: Oropharynx is clear  Eyes:      General:         Right eye: No discharge  Left eye: No discharge  Pupils: Pupils are equal, round, and reactive to light  Cardiovascular:      Rate and Rhythm: Normal rate and regular rhythm  Pulses: Normal pulses  Heart sounds: Normal heart sounds  No murmur heard  Pulmonary:      Effort: Pulmonary effort is normal  No respiratory distress  Comments: Decreased in bases    Abdominal:      General: Bowel sounds are normal  There is no distension  Palpations: Abdomen is soft  There is no mass  Tenderness: There is no abdominal tenderness  Musculoskeletal:         General: No swelling or tenderness  Normal range of motion  Cervical back: Normal range of motion and neck supple  No muscular tenderness  Right lower leg: Edema present  Left lower leg: Edema present  Skin:     General: Skin is warm and dry  Capillary Refill: Capillary refill takes less than 2 seconds  Findings: Erythema (abdominal folds, non tender ) present  No rash  Neurological:      General: No focal deficit present  Mental Status: She is alert and oriented to person, place, and time  Mental status is at baseline  Psychiatric:         Mood and Affect: Mood normal          Behavior: Behavior normal          Thought Content:  Thought content normal          Judgment: Judgment normal          Additional Data:     Labs:    Results from last 7 days   Lab Units 06/13/22  0508   WBC Thousand/uL 8 89   HEMOGLOBIN g/dL 9 8*   HEMATOCRIT % 33 3*   PLATELETS Thousands/uL 371   NEUTROS PCT % 78*   LYMPHS PCT % 12*   MONOS PCT % 6   EOS PCT % 3     Results from last 7 days   Lab Units 06/13/22  0508   SODIUM mmol/L 135   POTASSIUM mmol/L 4 6   CHLORIDE mmol/L 98   CO2 mmol/L 26   BUN mg/dL 64*   CREATININE mg/dL 2 37*   CALCIUM mg/dL 8 7     Results from last 7 days   Lab Units 06/13/22  0508   INR  2 73*               * I Have Reviewed All Lab Data Listed Above  * Additional Pertinent Lab Tests Reviewed: [address] Admission  Reviewed    Imaging:  Imaging Reports Reviewed Today Include: n/a     Recent Cultures (last 7 days):           Last 24 Hours Medication List:   Current Facility-Administered Medications   Medication Dose Route Frequency Provider Last Rate    acetaminophen  650 mg Oral Q4H [facility]      allopurinol  100 mg Oral Daily [place], [place]      DULoxetine  20 mg Oral Daily [place], [place]      levothyroxine  125 mcg Oral Daily [place], [place]      ondansetron  4 mg Intravenous Q6H PRN [facility] S[facility] PA-[facility]      oxyCODONE  5 mg Oral Q6H PRN ORACIO Hansen      saccharomyces boulardii  250 mg Oral BID [place], [place]      triamcinolone   Topical BID [place], [place]      [START ON 6/14/2022] warfarin  5 mg Oral Every Other Day ORACIO Hansen      And    warfarin  2 mg Oral Every Other Day ORACIO Hansen          Today, Patient Was Seen By: ORACIO Lowe    ** Please Note: Dictation voice to text software may have been used in the creation of this document   **

## 2022-06-13 NOTE — CASE MANAGEMENT
Case Management Discharge Planning Note    Patient name Reyes Ahumada  Location [address] [id number] MRN [id number]  : 1950 Date 2022       Current Admission Date: 2022  Current Admission Diagnosis:Panniculitis   Patient Active Problem List    Diagnosis Date Noted    Acute pain of left shoulder 2022    Abdominal wall cellulitis     BMI 70 and over, adult ([place] [address] ) 2022    Positive blood culture 2022    Chronic atrial fibrillation ([place] [address] ) 2022    C  difficile colitis 2022    History of Clostridioides difficile infection 2022    Lymphedema of extremity 2021    Other specified hypothyroidism 10/03/2020    Mild episode of recurrent major depressive disorder ([place] [address] ) 10/03/2020    Idiopathic chronic 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 10/03/2020    Primary osteoarthritis involving multiple joints 10/03/2020    Class 3 severe obesity due to excess calories with serious comorbidity and body mass index (BMI) greater than or equal to 70 in adult [facility]) 10/03/2020    Chronic diastolic congestive heart failure ([place] [address] ) 10/03/2020    Panniculitis 10/03/2020    Gastroesophageal reflux disease without esophagitis 10/03/2020    Hx MRSA infection 2020    Left ovarian cyst 2017    Depression with anxiety 07/15/2017    Anemia 2016    Ambulatory dysfunction 2016    Acute kidney injury superimposed on CKD ([place] [address] ) 2016    Adjustment disorder 2013    Irritable bowel syndrome 2012    Left atrial enlargement 2012    Left ventricular hypertrophy 2012    Carpal tunnel syndrome 2012    Gout 2012    Hyperlipidemia 2012    Osteoarthritis 2012    Symptomatic menopausal or female climacteric states 2012      LOS (days): 17  Geometric Mean LOS (GMLOS) (days): 3 20  Days to GMLOS:-13 6     OBJECTIVE:  Risk of Unplanned Readmission Score: 41 51         Current admission status: Inpatient   Preferred Pharmacy:   [address] [facility] [address]  [address] [place] [address]  Phone: [phone] Fax: [phone]    [facility], [address]  [address]  [facility] [address]  Phone: [phone] Fax: [phone]    Primary Care Provider: ORACIO Barba    Primary Insurance: MEDICARE  Secondary Insurance: AARP    DISCHARGE DETAILS:    Discharge planning discussed with[de-identified] patient and cm spoke with Smart Neha at 16:45pm   pt does have a caregiver to be at her home tomorrow to receive her when she arrives home 10 am transport  Freedom of Choice: Yes  Comments - Freedom of Choice: cm left  mesasge for Kavon Grit [phone] ext [phone] and a mesage was left to make her aware of [facility] planned d/c for tomorrow  CM contacted family/caregiver?: Yes             Contacts  Patient Contacts: Ashely Stahl  Relationship to Patient[de-identified] Family (niece)  Contact Method: Phone  Phone Number: [phone]  mesage was left to call to discuss d/c plan  Reason/Outcome: Discharge [address]         Is the patient interested in [facility] at discharge?: No (pt stated her  [address]  needs to order)    DME Referral Provided  Referral made for DME?: No    Other Referral/Resources/Interventions Provided:  Referral Comments: pt 's care giver is able to start her care tomorrow, they will be at [facility] home to receive the pt   pt has a 10 transport time [facility]    Would you like to participate in our [facility] service program?  : No - Declined    Treatment Team Recommendation: Home (home with caregivers & home visit from [facility] [facility]  on 6/15/2022- s 10 am)     Transport at Discharge : [facility]  Dispatcher Contacted: Yes  Number/Name of Dispatcher: [phone]  [facility]  [place]  ETA of Transport (Date): 06/14/22  ETA of Transport (Time): 1000              IMM Given (Date):: 06/13/22  IMM Given to[de-identified] Patient  Family notified[de-identified] message was left for her niece

## 2022-06-13 NOTE — PLAN OF CARE
Problem: Potential for Falls  Goal: Patient will remain free of falls  Description: INTERVENTIONS:  - Educate patient/family on patient safety including physical limitations  - Instruct patient to call for assistance with activity   - Consult OT/PT to assist with strengthening/mobility   - Keep Call bell within reach  - Keep bed low and locked with side rails adjusted as appropriate  - Keep care items and personal belongings within reach  - Initiate and maintain comfort rounds  - Make Fall Risk Sign visible to staff  - Offer Toileting in advance of need  - Initiate/Maintain bed alarm  - Obtain necessary fall risk management equipment:   - Apply yellow socks and bracelet for high fall risk patients  - Consider moving patient to room near nurses station  Outcome: Progressing     Problem: PAIN - ADULT  Goal: Verbalizes/displays adequate comfort level or baseline comfort level  Description: Interventions:  - Encourage patient to monitor pain and request assistance  - Assess pain using appropriate pain scale  - Administer analgesics based on type and severity of pain and evaluate response  - Implement non-pharmacological measures as appropriate and evaluate response  - Consider cultural and social influences on pain and pain management  - Notify physician/advanced practitioner if interventions unsuccessful or patient reports new pain  Outcome: Progressing     Problem: INFECTION - ADULT  Goal: Absence or prevention of progression during hospitalization  Description: INTERVENTIONS:  - Assess and monitor for signs and symptoms of infection  - Monitor lab/diagnostic results  - Monitor all insertion sites, i e  indwelling lines, tubes, and drains  - Monitor endotracheal if appropriate and nasal secretions for changes in amount and color  - [place] appropriate cooling/warming therapies per order  - Administer medications as ordered  - Instruct and encourage patient and family to use good hand hygiene technique  - Identify and instruct in appropriate isolation precautions for identified infection/condition  Outcome: Progressing  Goal: Absence of fever/infection during neutropenic period  Description: INTERVENTIONS:  - Monitor WBC    Outcome: Progressing     Problem: SAFETY ADULT  Goal: Maintain or return to baseline ADL function  Description: INTERVENTIONS:  -  Assess patient's ability to carry out ADLs; assess patient's baseline for ADL function and identify physical deficits which impact ability to perform ADLs (bathing, care of mouth/teeth, toileting, grooming, dressing, etc )  - Assess/evaluate cause of self-care deficits   - Assess range of motion  - Assess patient's mobility; develop plan if impaired  - Assess patient's need for assistive devices and provide as appropriate  - Encourage maximum independence but intervene and supervise when necessary  - Involve family in performance of ADLs  - Assess for home care needs following discharge   - Consider OT consult to assist with ADL evaluation and planning for discharge  - Provide patient education as appropriate  Outcome: Progressing  Goal: Maintains/Returns to pre admission functional level  Description: INTERVENTIONS:  - Perform BMAT or MOVE assessment daily    - Set and communicate daily mobility goal to care team and patient/family/caregiver  - Collaborate with rehabilitation services on mobility goals if consulted  - Reposition patient every two hours  - Record patient progress and toleration of activity level   Outcome: Progressing     Problem: Knowledge Deficit  Goal: Patient/family/caregiver demonstrates understanding of disease process, treatment plan, medications, and discharge instructions  Description: Complete learning assessment and assess knowledge base    Interventions:  - Provide teaching at level of understanding  - Provide teaching via preferred learning methods  Outcome: Progressing     Problem: SKIN/TISSUE INTEGRITY - ADULT  Goal: Skin Integrity remains intact(Skin Breakdown Prevention)  Description: Assess:  -Perform Gucci assessment every shift  -Clean and moisturize skin   -Inspect skin when repositioning, toileting, and assisting with ADLS  -Assess under medical devices   -Assess extremities for adequate circulation and sensation     Bed Management:  -Have minimal linens on bed & keep smooth, unwrinkled  -Change linens as needed when moist or perspiring  -Avoid sitting or lying in one position while in bed    Toileting:  -Offer bedside commode  -Assess for incontinence   -Use incontinent care products after each incontinent episode    Activity:  -Encourage or provide ROM exercises   -Turn and reposition patient every two Hours  -Use appropriate equipment to lift or move patient in bed  -Instruct/ Assist with weight shifting when out of bed in chair  -Consider limitation of chair time     Skin Care:  -Avoid use of baby powder, tape, friction and shearing, hot water or constrictive clothing  -Relieve pressure over bony prominences  -Do not massage red bony areas    Next Steps:  -Teach patient strategies to minimize risks    -Consider consults to  interdisciplinary teams   Outcome: Progressing  Goal: Incision(s), wounds(s) or drain site(s) healing without S/S of infection  Description: INTERVENTIONS  - Assess and document dressing, incision, wound bed, drain sites and surrounding tissue  - Provide patient and family education  - Perform skin care/dressing changes per order  Outcome: Progressing  Goal: Pressure injury heals and does not worsen  Description: Interventions:  - Implement low air loss mattress or specialty surface (Criteria met)  - Apply silicone foam dressing  - Instruct/assist with weight shifting when in chair   - Limit chair time   - Use special pressure reducing interventions  when in chair   - Apply fecal or urinary incontinence containment device   - Perform passive or active ROM   - Turn and reposition patient & offload bony prominences every two hours   - Utilize friction reducing device or surface for transfers   - Consider consults to  interdisciplinary teams   - Use incontinent care products after each incontinent episode  - Consider nutrition services referral as needed  Outcome: Progressing     Problem: MOBILITY - ADULT  Goal: Maintain or return to baseline ADL function  Description: INTERVENTIONS:  -  Assess patient's ability to carry out ADLs; assess patient's baseline for ADL function and identify physical deficits which impact ability to perform ADLs (bathing, care of mouth/teeth, toileting, grooming, dressing, etc )  - Assess/evaluate cause of self-care deficits   - Assess range of motion  - Assess patient's mobility; develop plan if impaired  - Assess patient's need for assistive devices and provide as appropriate  - Encourage maximum independence but intervene and supervise when necessary  - Involve family in performance of ADLs  - Assess for home care needs following discharge   - Consider OT consult to assist with ADL evaluation and planning for discharge  - Provide patient education as appropriate  Outcome: Progressing  Goal: Maintains/Returns to pre admission functional level  Description: INTERVENTIONS:  - Perform BMAT or MOVE assessment daily    - Set and communicate daily mobility goal to care team and patient/family/caregiver  - Collaborate with rehabilitation services on mobility goals if consulted  - Reposition patient every two hours    - Record patient progress and toleration of activity level   Outcome: Progressing

## 2022-06-13 NOTE — ASSESSMENT & PLAN NOTE
· Baseline creatinine appears to be between 1-1 1 mg/dL  · Creatininine continues to trend upward, possibly due to overdiuresis    · Urine studies reviewed   · Held lasix, last given on 06/06  · Nephrology input appreciated   · Cr improving after IVF

## 2022-06-13 NOTE — PLAN OF CARE
Problem: Potential for Falls  Goal: Patient will remain free of falls  Description: INTERVENTIONS:  - Educate patient/family on patient safety including physical limitations  - Instruct patient to call for assistance with activity   - Consult OT/PT to assist with strengthening/mobility   - Keep Call bell within reach  - Keep bed low and locked with side rails adjusted as appropriate  - Keep care items and personal belongings within reach  - Initiate and maintain comfort rounds  - Make Fall Risk Sign visible to staff  - Offer Toileting in advance of need  - Initiate/Maintain bed alarm  - Obtain necessary fall risk management equipment:   - Apply yellow socks and bracelet for high fall risk patients  - Consider moving patient to room near nurses station  Outcome: Progressing     Problem: PAIN - ADULT  Goal: Verbalizes/displays adequate comfort level or baseline comfort level  Description: Interventions:  - Encourage patient to monitor pain and request assistance  - Assess pain using appropriate pain scale  - Administer analgesics based on type and severity of pain and evaluate response  - Implement non-pharmacological measures as appropriate and evaluate response  - Consider cultural and social influences on pain and pain management  - Notify physician/advanced practitioner if interventions unsuccessful or patient reports new pain  Outcome: Progressing     Problem: INFECTION - ADULT  Goal: Absence or prevention of progression during hospitalization  Description: INTERVENTIONS:  - Assess and monitor for signs and symptoms of infection  - Monitor lab/diagnostic results  - Monitor all insertion sites, i e  indwelling lines, tubes, and drains  - Monitor endotracheal if appropriate and nasal secretions for changes in amount and color  - [place] appropriate cooling/warming therapies per order  - Administer medications as ordered  - Instruct and encourage patient and family to use good hand hygiene technique  - Identify and instruct in appropriate isolation precautions for identified infection/condition  Outcome: Progressing  Goal: Absence of fever/infection during neutropenic period  Description: INTERVENTIONS:  - Monitor WBC    Outcome: Progressing     Problem: SAFETY ADULT  Goal: Maintain or return to baseline ADL function  Description: INTERVENTIONS:  -  Assess patient's ability to carry out ADLs; assess patient's baseline for ADL function and identify physical deficits which impact ability to perform ADLs (bathing, care of mouth/teeth, toileting, grooming, dressing, etc )  - Assess/evaluate cause of self-care deficits   - Assess range of motion  - Assess patient's mobility; develop plan if impaired  - Assess patient's need for assistive devices and provide as appropriate  - Encourage maximum independence but intervene and supervise when necessary  - Involve family in performance of ADLs  - Assess for home care needs following discharge   - Consider OT consult to assist with ADL evaluation and planning for discharge  - Provide patient education as appropriate  Outcome: Progressing  Goal: Maintains/Returns to pre admission functional level  Description: INTERVENTIONS:  - Perform BMAT or MOVE assessment daily    - Set and communicate daily mobility goal to care team and patient/family/caregiver  - Collaborate with rehabilitation services on mobility goals if consulted  - Reposition patient every two hours    - Record patient progress and toleration of activity level   Outcome: Progressing     Problem: DISCHARGE PLANNING  Goal: Discharge to home or other facility with appropriate resources  Description: INTERVENTIONS:  - Identify barriers to discharge w/patient and caregiver  - Arrange for needed discharge resources and transportation as appropriate  - Identify discharge learning needs (meds, wound care, etc )  - Refer to Case Management Department for coordinating discharge planning if the patient needs post-hospital services based on physician/advanced practitioner order or complex needs related to functional status, cognitive ability, or social support system  Outcome: Progressing     Problem: Knowledge Deficit  Goal: Patient/family/caregiver demonstrates understanding of disease process, treatment plan, medications, and discharge instructions  Description: Complete learning assessment and assess knowledge base    Interventions:  - Provide teaching at level of understanding  - Provide teaching via preferred learning methods  Outcome: Progressing     Problem: SKIN/TISSUE INTEGRITY - ADULT  Goal: Skin Integrity remains intact(Skin Breakdown Prevention)  Description: Assess:  -Perform Gucci assessment every shift  -Clean and moisturize skin   -Inspect skin when repositioning, toileting, and assisting with ADLS  -Assess under medical devices   -Assess extremities for adequate circulation and sensation     Bed Management:  -Have minimal linens on bed & keep smooth, unwrinkled  -Change linens as needed when moist or perspiring  -Avoid sitting or lying in one position while in bed    Toileting:  -Offer bedside commode  -Assess for incontinence   -Use incontinent care products after each incontinent episode    Activity:  -Encourage or provide ROM exercises   -Turn and reposition patient every two Hours  -Use appropriate equipment to lift or move patient in bed  -Instruct/ Assist with weight shifting when out of bed in chair  -Consider limitation of chair time     Skin Care:  -Avoid use of baby powder, tape, friction and shearing, hot water or constrictive clothing  -Relieve pressure over bony prominences  -Do not massage red bony areas    Next Steps:  -Teach patient strategies to minimize risks    -Consider consults to  interdisciplinary teams   Outcome: Progressing  Goal: Incision(s), wounds(s) or drain site(s) healing without S/S of infection  Description: INTERVENTIONS  - Assess and document dressing, incision, wound bed, drain sites and surrounding tissue  - Provide patient and family education  - Perform skin care/dressing changes per order  Outcome: Progressing  Goal: Pressure injury heals and does not worsen  Description: Interventions:  - Implement low air loss mattress or specialty surface (Criteria met)  - Apply silicone foam dressing  - Instruct/assist with weight shifting when in chair   - Limit chair time   - Use special pressure reducing interventions  when in chair   - Apply fecal or urinary incontinence containment device   - Perform passive or active ROM   - Turn and reposition patient & offload bony prominences every two hours   - Utilize friction reducing device or surface for transfers   - Consider consults to  interdisciplinary teams   - Use incontinent care products after each incontinent episode  - Consider nutrition services referral as needed  Outcome: Progressing     Problem: MOBILITY - ADULT  Goal: Maintain or return to baseline ADL function  Description: INTERVENTIONS:  -  Assess patient's ability to carry out ADLs; assess patient's baseline for ADL function and identify physical deficits which impact ability to perform ADLs (bathing, care of mouth/teeth, toileting, grooming, dressing, etc )  - Assess/evaluate cause of self-care deficits   - Assess range of motion  - Assess patient's mobility; develop plan if impaired  - Assess patient's need for assistive devices and provide as appropriate  - Encourage maximum independence but intervene and supervise when necessary  - Involve family in performance of ADLs  - Assess for home care needs following discharge   - Consider OT consult to assist with ADL evaluation and planning for discharge  - Provide patient education as appropriate  Outcome: Progressing  Goal: Maintains/Returns to pre admission functional level  Description: INTERVENTIONS:  - Perform BMAT or MOVE assessment daily    - Set and communicate daily mobility goal to care team and patient/family/caregiver     - Collaborate with rehabilitation services on mobility goals if consulted  - Reposition patient every two hours  - Record patient progress and toleration of activity level   Outcome: Progressing     Problem: Prexisting or High Potential for Compromised Skin Integrity  Goal: Skin integrity is maintained or improved  Description: INTERVENTIONS:  - Identify patients at risk for skin breakdown  - Assess and monitor skin integrity  - Assess and monitor nutrition and hydration status  - Monitor labs   - Assess for incontinence   - Turn and reposition patient  - Assist with mobility/ambulation  - Relieve pressure over bony prominences  - Avoid friction and shearing  - Provide appropriate hygiene as needed including keeping skin clean and dry  - Evaluate need for skin moisturizer/barrier cream  - Collaborate with interdisciplinary team   - Patient/family teaching  - Consider wound care consult   Outcome: Progressing     Problem: Nutrition/Hydration-ADULT  Goal: Nutrient/Hydration intake appropriate for improving, restoring or maintaining nutritional needs  Description: Monitor and assess patient's nutrition/hydration status for malnutrition  Collaborate with interdisciplinary team and initiate plan and interventions as ordered  Monitor patient's weight and dietary intake as ordered or per policy  Utilize nutrition screening tool and intervene as necessary  Determine patient's food preferences and provide high-protein, high-caloric foods as appropriate       INTERVENTIONS:  - Monitor oral intake, urinary output, labs, and treatment plans  - Assess nutrition and hydration status and recommend course of action  - Evaluate amount of meals eaten  - Assist patient with eating if necessary   - Allow adequate time for meals  - Recommend/ encourage appropriate diets, oral nutritional supplements, and vitamin/mineral supplements  - Order, calculate, and assess calorie counts as needed  - Recommend, monitor, and adjust tube feedings and TPN/PPN based on assessed needs  - Assess need for intravenous fluids  - Provide specific nutrition/hydration education as appropriate  - Include patient/family/caregiver in decisions related to nutrition  Outcome: Progressing

## 2022-06-14 VITALS
RESPIRATION RATE: 17 BRPM | DIASTOLIC BLOOD PRESSURE: 64 MMHG | TEMPERATURE: 97.7 F | HEART RATE: 72 BPM | WEIGHT: 293 LBS | SYSTOLIC BLOOD PRESSURE: 119 MMHG | HEIGHT: 62 IN | BODY MASS INDEX: 53.92 KG/M2 | OXYGEN SATURATION: 95 %

## 2022-06-14 LAB
ANION GAP SERPL CALCULATED.3IONS-SCNC: 8 MMOL/L (ref 4–13)
BUN SERPL-MCNC: 70 MG/DL (ref 5–25)
CALCIUM SERPL-MCNC: 8.8 MG/DL (ref 8.4–10.2)
CHLORIDE SERPL-SCNC: 98 MMOL/L (ref 96–108)
CO2 SERPL-SCNC: 27 MMOL/L (ref 21–32)
CREAT SERPL-MCNC: 2.56 MG/DL (ref 0.6–1.3)
CREAT UR-MCNC: 78.7 MG/DL
GFR SERPL CREATININE-BSD FRML MDRD: 18 ML/MIN/1.73SQ M
GLUCOSE SERPL-MCNC: 88 MG/DL (ref 65–140)
INR PPP: 2.54 (ref 0.84–1.19)
POTASSIUM SERPL-SCNC: 4.6 MMOL/L (ref 3.5–5.3)
PROTHROMBIN TIME: 26.7 SECONDS (ref 11.6–14.5)
SODIUM 24H UR-SCNC: 27 MOL/L
SODIUM SERPL-SCNC: 133 MMOL/L (ref 135–147)

## 2022-06-14 PROCEDURE — 82570 ASSAY OF URINE CREATININE: CPT | Performed by: INTERNAL MEDICINE

## 2022-06-14 PROCEDURE — 80048 BASIC METABOLIC PNL TOTAL CA: CPT | Performed by: NURSE PRACTITIONER

## 2022-06-14 PROCEDURE — 84300 ASSAY OF URINE SODIUM: CPT | Performed by: INTERNAL MEDICINE

## 2022-06-14 PROCEDURE — 99232 SBSQ HOSP IP/OBS MODERATE 35: CPT | Performed by: INTERNAL MEDICINE

## 2022-06-14 PROCEDURE — 99239 HOSP IP/OBS DSCHRG MGMT >30: CPT | Performed by: NURSE PRACTITIONER

## 2022-06-14 PROCEDURE — 85610 PROTHROMBIN TIME: CPT | Performed by: NURSE PRACTITIONER

## 2022-06-14 RX ORDER — OXYCODONE HYDROCHLORIDE 5 MG/1
5 TABLET ORAL EVERY 6 HOURS PRN
Qty: 5 TABLET | Refills: 0 | Status: SHIPPED | OUTPATIENT
Start: 2022-06-14

## 2022-06-14 RX ORDER — SACCHAROMYCES BOULARDII 250 MG
250 CAPSULE ORAL 2 TIMES DAILY
Qty: 60 CAPSULE | Refills: 0 | Status: SHIPPED | OUTPATIENT
Start: 2022-06-14 | End: 2022-07-14

## 2022-06-14 RX ADMIN — OXYCODONE HYDROCHLORIDE 5 MG: 5 TABLET ORAL at 02:50

## 2022-06-14 RX ADMIN — LEVOTHYROXINE SODIUM 125 MCG: 25 TABLET ORAL at 09:00

## 2022-06-14 RX ADMIN — DULOXETINE 20 MG: 20 CAPSULE, DELAYED RELEASE ORAL at 09:00

## 2022-06-14 RX ADMIN — ALLOPURINOL 100 MG: 100 TABLET ORAL at 09:00

## 2022-06-14 RX ADMIN — Medication 250 MG: at 09:00

## 2022-06-14 NOTE — ASSESSMENT & PLAN NOTE
· Baseline creatinine appears to be between 1-1 1 mg/dL  · Creatininine continues to trend upward, possibly due to overdiuresis vs ATN    · Urine studies reviewed   · Held lasix, last given on 06/06  · Nephrology input appreciated   ·   Repeat BMP on 06/16/2022 and follow-up with nephrology outpatient  ·  continue holding diuresis for now

## 2022-06-14 NOTE — DISCHARGE SUMMARY
Toby 46  Discharge- Jared Martin 1950, 80 y o  female MRN: [id number]  Unit/Bed#: -01 Encounter: [id number]  Primary Care Provider: ORACIO José   Date and time admitted to hospital: 5/27/2022  7:52 PM    * Panniculitis  Assessment & Plan  Recently hospitalized at [facility] who re-presented for recurrent cellulitis of pannus in setting of morbid obesity, chronic lymphedema, increased moisture and superficial skin breakdown  · Wound culture with pseudomonas and proteus  · Cefepime 2gm q8 completed on 6/10 with PO vancomycin prophylaxis for C diff completed on 6/13  · Surgery input appreciated- no surgical intervention or abscess noted  · Continue aggressive skin care with minimizing friction and moisture related skin breakdown  Frequent offloading and repositioning    · Recommend that patient be evaluated by outpatient for chronic lymphedema    Acute pain of left shoulder  Assessment & Plan  · Patient reports atraumatic left shoulder pain   · Left shoulder x-ray:  No obvious osseous abnormality  · Pain management     BMI 70 and over, adult [facility])  Assessment & Plan  · Continue supportive care   · Risk factor for recurrent infections     Positive blood culture  Assessment & Plan  · 5/27 Positive blood culture 1/2 set- likely contaminant   · 6/3 repeat blood cultures- negative   · Does have history of MRSE bacteremia  · Appreciate infectious disease evaluation  · Low suspicion for true bacteremia and endovascular infection  · Completed antibiotic      Chronic atrial fibrillation ([facility] [address] )  Assessment & Plan  · Does not appear to be on rate control  · INR therapeutic, continue coumadin  5 mg alternating with 2 mg  History of Clostridioides difficile infection  Assessment & Plan  · Completed p o   Vancomycin prophylaxis  · Continue probiotics     Acute kidney injury superimposed on CKD ([facility] [address] )  Assessment & Plan  · Baseline creatinine appears to be between 1-1 1 mg/dL  · Creatininine continues to trend upward, possibly due to overdiuresis vs ATN  · Urine studies reviewed   · Held lasix, last given on 06/06  · Nephrology input appreciated   ·   Repeat BMP on 06/16/2022 and follow-up with nephrology outpatient  ·  continue holding diuresis for now      Lymphedema of extremity  Assessment & Plan  · Chronic lymphedema with panniculitis  · Treated with IV Lasix   · Will need to follow up with lymphedema clinic outpatient     Chronic diastolic congestive heart failure ([facility] [address] )  Assessment & Plan  Wt Readings from Last 3 Encounters:   06/14/22 (!) 211 kg (464 lb 8 2 oz)   05/08/22 (!) 212 kg (467 lb)   05/06/22 (!) 193 kg (425 lb)     · Diuresed for lower extremity swelling with IV Lasix  · Monitor BMP, diuretics on hold at this time due to LARRY   · Continue fluid restriction, low-sodium diet         Discharging Physician / Practitioner: Ellen Rosado  PCP: Ellen Thurman  Admission Date:   Admission Orders (From admission, onward)     Ordered        05/27/22 2325  Inpatient Admission  Once            05/27/22 2314  Inpatient Admission  Once,   Status:  Canceled                      Discharge Date: 06/14/22    Medical Problems             Resolved Problems  Date Reviewed: 6/14/2022   None                 Consultations During Hospital Stay:  · ID   · Surgery  · Nephrology     Procedures Performed:   · None     Significant Findings / Test Results:   · Left shoulder xray No acute osseous abnormality  · US superficial lump Diffuse subcutaneous edema without organized fluid collection  Incidental Findings:   · None      Test Results Pending at Discharge (will require follow up):    · None      Outpatient Tests Requested:  ·  follow-up PCP, Nephrology    Complications:      none    Reason for Admission:  Worsening redness on her abdomen    Hospital Course:     Aggie Joseph is a 80 y o  female patient who originally presented to the hospital on 5/27/2022 due to panniculitis  The patient was started on IV antibiotics  She was evaluated by surgery  Ultrasound for soft tissue shows no indication of underlying abscess  Additionally no acute surgical intervention is indicated during the hospital stay  Infectious Disease evaluation done  The patient completed for total of 10 days of vancomycin and cefepime  The patient has positive blood cultures however Infectious Disease to not suspect a true bacteremia  Repeated blood cultures remain negative  Nephrology evaluation done due to acute kidney injury on top of chronic kidney disease  Suspected that this is due to over diuresis versus ATN  The patient renal function  Appears to be trending up however nephrology is okay for renal function to be monitor as an outpatient  The patient however is aware that if her renal function is worsening she may need to be readmitted  Urine studies were sent  Currently is pending  If this indicates ATN the patient may need to resume her diuretic  The patient is aware to follow-up with Nephrology for further guidance  Please see above list of diagnoses and related plan for additional information  Condition at Discharge: good     Discharge Day Visit / Exam:     Subjective:  Feeling good  Would like to go home  Vitals: Blood Pressure: 119/64 (06/14/22 0729)  Pulse: 72 (06/14/22 0729)  Temperature: 97 7 °F (36 5 °C) (06/14/22 0729)  Temp Source: Temporal (06/12/22 2208)  Respirations: 17 (06/14/22 0729)  Height: 5' 2" (157 5 cm) (05/27/22 1955)  Weight - Scale: (!) 211 kg (464 lb 8 2 oz) (06/14/22 0553)  SpO2: 95 % (06/14/22 0729)  Exam:   Physical Exam  Vitals and nursing note reviewed  Constitutional:       General: She is not in acute distress  Appearance: Normal appearance  She is obese  HENT:      Head: Normocephalic and atraumatic  Right Ear: External ear normal       Left Ear: External ear normal       Nose: Nose normal  No rhinorrhea  Mouth/Throat:      Mouth: Mucous membranes are moist       Pharynx: Oropharynx is clear  Eyes:      General:         Right eye: No discharge  Left eye: No discharge  Pupils: Pupils are equal, round, and reactive to light  Cardiovascular:      Rate and Rhythm: Normal rate  Rhythm irregular  Pulses: Normal pulses  Heart sounds: Normal heart sounds  No murmur heard  Pulmonary:      Effort: Pulmonary effort is normal  No respiratory distress  Breath sounds: Normal breath sounds  Abdominal:      General: Bowel sounds are normal  There is no distension  Palpations: Abdomen is soft  There is no mass  Tenderness: There is no abdominal tenderness  Musculoskeletal:         General: No swelling or tenderness  Normal range of motion  Cervical back: Normal range of motion and neck supple  No muscular tenderness  Right lower leg: Edema present  Left lower leg: Edema present  Skin:     General: Skin is warm and dry  Capillary Refill: Capillary refill takes less than 2 seconds  Coloration: Skin is pale  Findings: No erythema or rash  Neurological:      General: No focal deficit present  Mental Status: She is alert and oriented to person, place, and time  Mental status is at baseline  Psychiatric:         Mood and Affect: Mood normal          Behavior: Behavior normal          Thought Content: Thought content normal          Judgment: Judgment normal          Discussion with Family: none presented during exam    Discharge instructions/Information to patient and family:   See after visit summary for information provided to patient and family  Provisions for Follow-Up Care:  See after visit summary for information related to follow-up care and any pertinent home health orders        Disposition:     Home with [facility] Services (Reminder: Complete face to face encounter)      Planned Readmission:    No      Discharge Statement:  I spent 38 minutes discharging the patient  This time was spent on the day of discharge  I had direct contact with the patient on the day of discharge  Greater than 50% of the total time was spent examining patient, answering all patient questions, arranging and discussing plan of care with patient as well as directly providing post-discharge instructions  Additional time then spent on discharge activities  Discharge Medications:  See after visit summary for reconciled discharge medications provided to patient and family        ** Please Note: This note has been constructed using a voice recognition system **

## 2022-06-14 NOTE — NURSING NOTE
AWAKE AND ORIENTED X4  R/A STATUS  RESP EASY  NO SOB  LUNGS CLEAR HEART IS REGULAR  02 SAT 95% HR 75/MIN  ABDOMEN IS SOFT/OBESE  LBM 6/12/22  C/O OF LEFT SHOULDER PAIN 6/10  TRACE BLE  PLUS 2 PEDAL/RADIAL PULSES  VOIDS FREELY WITH ASSIST TO BED ERICKSON  BI BOY BED MAINTAINED  NO DISTRESS AND NO COMPLAINTS   CALL BELL NEAR

## 2022-06-14 NOTE — NURSING NOTE
Patient DC to home via EMS in stable condition  All belongings packed and sent with patient  DC instructions reviewed with patient and sent

## 2022-06-14 NOTE — ASSESSMENT & PLAN NOTE
· Patient reports atraumatic left shoulder pain   · Left shoulder x-ray:  No obvious osseous abnormality      · Pain management

## 2022-06-14 NOTE — WOUND OSTOMY CARE
Progress Note - Wound   [place] 80 y o  female MRN: [id number]  Unit/Bed#: -01 Encounter: [id number]        Assessment; Wound care weekly follow up  Patient on Kreg bariatric bed, She is awake, alert and oriented, cooperative with assessment  She states she will be going home tomorrow  Patient can be repositioned in bed with assist of 2 and is dependent upon nursing staff or caretaker for all of her needs  Findings  1  MASD/ITD Area to the mid abdominal fold, 0 2 X 14 cm  Partial thickness wound, beefy red, scant bloody drainage  2  Bilateral heels intact  3  Sacrum and buttocks intact per patient  4  Abdominal folds to the right and left folds are now intact and pink  5  Lateral hip/upper thighs with edema, no erythema or increased warmth    Skin and Wound Care Plan:   1  Cleanse bilateral skin folds to abdomen and inguinal areas with foaming cleanser & pat dry well  Apply small amount of Kenalog cream to wound beds and place ABD pad to each skin fold  Change daily & prn soilage/dislodgement  2  Apply hydraguard to b/l heels, buttocks and sacrum BID and PRN for prevention and protection  3  Apply skin nourishing cream the entire skin daily for moisture  4  Turn and reposition patient every  2 hours   5  Elevate heels off of bed with pillows to offload pressure   6  Apply EHOB waffle cushion to chair when OOB, if able  7  The patient has a bariatric bed with low air-loss, pressure redistribution mattress in place     Wound; Wound 05/28/22 Cellulitis Hip Anterior;Left;Proximal (Active)   Wound Image   06/13/22 1317   Wound Description Pink;Edema 06/13/22 2100   Alison-wound Assessment Clean;Dry; Intact 06/13/22 0930   Wound Length (cm) 0 2 cm 06/13/22 2100   Wound Width (cm) 14 cm 06/13/22 2100   Wound Depth (cm) 0 1 cm 06/13/22 1317   Wound Surface Area (cm^2) 0 cm^2 06/13/22 2100   Wound Volume (cm^3) 0 7 cm^3 06/13/22 1317   Calculated Wound Volume (cm^3) 0 7 cm^3 06/13/22 1317   Drainage Amount None 06/13/22 2100   Drainage Description Serous 06/08/22 2012   Treatments Site care;Cleansed 06/12/22 1007   Dressing Open to air 06/13/22 2100   Dressing Changed Changed 06/02/22 1927   Patient Tolerance Tolerated well 06/13/22 2100   Dressing Status Clean;Dry; Intact 06/12/22 2031       Wound 05/28/22 Cellulitis Abdomen Right; Lower (Active)   Wound Image    05/29/22 1248   Wound Description Intact; Pink 06/13/22 2100   Alison-wound Assessment Pink 06/13/22 0930   Wound Length (cm) 0 cm 06/13/22 2100   Wound Width (cm) 0 cm 06/13/22 2100   Wound Depth (cm) 0 cm 06/13/22 2100   Wound Surface Area (cm^2) 0 cm^2 06/13/22 2100   Wound Volume (cm^3) 0 cm^3 06/13/22 2100   Calculated Wound Volume (cm^3) 0 cm^3 06/13/22 2100   Change in Wound Size % 100 06/13/22 2100   Drainage Amount None 06/13/22 2100   Drainage Description Tan 06/13/22 0930   Non-staged Wound Description Partial thickness 06/13/22 0930   Treatments Cleansed;Site care 06/11/22 0900   Dressing Dry dressing 06/13/22 2100   Dressing Changed Changed 06/12/22 1007   Patient Tolerance Tolerated well 06/13/22 2100   Dressing Status Clean;Dry; Intact 06/13/22 0930     Reviewed plan of care with primary RN  Recommendations written as orders  Wound care team to follow weekly while admitted  Questions or concerns [phone] [facility] Nurse    Eldon ELIZONDON, RN, [facility]

## 2022-06-14 NOTE — PLAN OF CARE
Problem: Potential for Falls  Goal: Patient will remain free of falls  Description: INTERVENTIONS:  - Educate patient/family on patient safety including physical limitations  - Instruct patient to call for assistance with activity   - Consult OT/PT to assist with strengthening/mobility   - Keep Call bell within reach  - Keep bed low and locked with side rails adjusted as appropriate  - Keep care items and personal belongings within reach  - Initiate and maintain comfort rounds  - Make Fall Risk Sign visible to staff  - Offer Toileting in advance of need  - Initiate/Maintain bed alarm  - Obtain necessary fall risk management equipment:   - Apply yellow socks and bracelet for high fall risk patients  - Consider moving patient to room near nurses station  6/14/2022 0105 by Freida Dean RN  Outcome: Progressing  6/14/2022 0048 by Freida Dean RN  Outcome: Progressing     Problem: PAIN - ADULT  Goal: Verbalizes/displays adequate comfort level or baseline comfort level  Description: Interventions:  - Encourage patient to monitor pain and request assistance  - Assess pain using appropriate pain scale  - Administer analgesics based on type and severity of pain and evaluate response  - Implement non-pharmacological measures as appropriate and evaluate response  - Consider cultural and social influences on pain and pain management  - Notify physician/advanced practitioner if interventions unsuccessful or patient reports new pain  6/14/2022 0105 by Freida Dean RN  Outcome: Progressing  6/14/2022 0048 by Freida Dean RN  Outcome: Progressing     Problem: INFECTION - ADULT  Goal: Absence or prevention of progression during hospitalization  Description: INTERVENTIONS:  - Assess and monitor for signs and symptoms of infection  - Monitor lab/diagnostic results  - Monitor all insertion sites, i e  indwelling lines, tubes, and drains  - Monitor endotracheal if appropriate and nasal secretions for changes in amount and color  - [place] appropriate cooling/warming therapies per order  - Administer medications as ordered  - Instruct and encourage patient and family to use good hand hygiene technique  - Identify and instruct in appropriate isolation precautions for identified infection/condition  6/14/2022 0105 by Jena Snyder RN  Outcome: Progressing  6/14/2022 0048 by Jena Snyder RN  Outcome: Progressing  Goal: Absence of fever/infection during neutropenic period  Description: INTERVENTIONS:  - Monitor WBC    6/14/2022 0105 by Jena Snyder RN  Outcome: Progressing  6/14/2022 0048 by Jena Snyder RN  Outcome: Progressing     Problem: SAFETY ADULT  Goal: Maintain or return to baseline ADL function  Description: INTERVENTIONS:  -  Assess patient's ability to carry out ADLs; assess patient's baseline for ADL function and identify physical deficits which impact ability to perform ADLs (bathing, care of mouth/teeth, toileting, grooming, dressing, etc )  - Assess/evaluate cause of self-care deficits   - Assess range of motion  - Assess patient's mobility; develop plan if impaired  - Assess patient's need for assistive devices and provide as appropriate  - Encourage maximum independence but intervene and supervise when necessary  - Involve family in performance of ADLs  - Assess for home care needs following discharge   - Consider OT consult to assist with ADL evaluation and planning for discharge  - Provide patient education as appropriate  6/14/2022 0105 by Jena Snyder RN  Outcome: Progressing  6/14/2022 0048 by Jena Snyder RN  Outcome: Progressing  Goal: Maintains/Returns to pre admission functional level  Description: INTERVENTIONS:  - Perform BMAT or MOVE assessment daily    - Set and communicate daily mobility goal to care team and patient/family/caregiver     - Collaborate with rehabilitation services on mobility goals if consulted  - Reposition patient every two hours   - Record patient progress and toleration of activity level   6/14/2022 0105 by Freida Dean RN  Outcome: Progressing  6/14/2022 0048 by Freida Dean RN  Outcome: Progressing     Problem: DISCHARGE PLANNING  Goal: Discharge to home or other facility with appropriate resources  Description: INTERVENTIONS:  - Identify barriers to discharge w/patient and caregiver  - Arrange for needed discharge resources and transportation as appropriate  - Identify discharge learning needs (meds, wound care, etc )  - Refer to Case Management Department for coordinating discharge planning if the patient needs post-hospital services based on physician/advanced practitioner order or complex needs related to functional status, cognitive ability, or social support system  6/14/2022 0105 by Freida Dean RN  Outcome: Progressing  6/14/2022 0048 by Freida Dean RN  Outcome: Progressing     Problem: Knowledge Deficit  Goal: Patient/family/caregiver demonstrates understanding of disease process, treatment plan, medications, and discharge instructions  Description: Complete learning assessment and assess knowledge base    Interventions:  - Provide teaching at level of understanding  - Provide teaching via preferred learning methods  6/14/2022 0105 by Freida Dean RN  Outcome: Progressing  6/14/2022 0048 by Freida Dean RN  Outcome: Progressing     Problem: SKIN/TISSUE INTEGRITY - ADULT  Goal: Skin Integrity remains intact(Skin Breakdown Prevention)  Description: Assess:  -Perform Gucci assessment every shift  -Clean and moisturize skin   -Inspect skin when repositioning, toileting, and assisting with ADLS  -Assess under medical devices   -Assess extremities for adequate circulation and sensation     Bed Management:  -Have minimal linens on bed & keep smooth, unwrinkled  -Change linens as needed when moist or perspiring  -Avoid sitting or lying in one position while in bed    Toileting:  -Offer bedside commode  -Assess for incontinence   -Use incontinent care products after each incontinent episode    Activity:  -Encourage or provide ROM exercises   -Turn and reposition patient every two Hours  -Use appropriate equipment to lift or move patient in bed  -Instruct/ Assist with weight shifting when out of bed in chair  -Consider limitation of chair time     Skin Care:  -Avoid use of baby powder, tape, friction and shearing, hot water or constrictive clothing  -Relieve pressure over bony prominences  -Do not massage red bony areas    Next Steps:  -Teach patient strategies to minimize risks    -Consider consults to  interdisciplinary teams   6/14/2022 0105 by Fern Hernandez, RN  Outcome: Progressing  6/14/2022 0048 by Fern Hernandez, RN  Outcome: Progressing  Goal: Incision(s), wounds(s) or drain site(s) healing without S/S of infection  Description: INTERVENTIONS  - Assess and document dressing, incision, wound bed, drain sites and surrounding tissue  - Provide patient and family education  - Perform skin care/dressing changes per order  6/14/2022 0105 by Fern Hernandez RN  Outcome: Progressing  6/14/2022 0048 by Fern Hernandez RN  Outcome: Progressing  Goal: Pressure injury heals and does not worsen  Description: Interventions:  - Implement low air loss mattress or specialty surface (Criteria met)  - Apply silicone foam dressing  - Instruct/assist with weight shifting when in chair   - Limit chair time   - Use special pressure reducing interventions  when in chair   - Apply fecal or urinary incontinence containment device   - Perform passive or active ROM   - Turn and reposition patient & offload bony prominences every two hours   - Utilize friction reducing device or surface for transfers   - Consider consults to  interdisciplinary teams   - Use incontinent care products after each incontinent episode  - Consider nutrition services referral as needed  6/14/2022 0105 by Fern Hernandez, RN  Outcome: Progressing  6/14/2022 0048 by Gus Brantley RN  Outcome: Progressing     Problem: MOBILITY - ADULT  Goal: Maintain or return to baseline ADL function  Description: INTERVENTIONS:  -  Assess patient's ability to carry out ADLs; assess patient's baseline for ADL function and identify physical deficits which impact ability to perform ADLs (bathing, care of mouth/teeth, toileting, grooming, dressing, etc )  - Assess/evaluate cause of self-care deficits   - Assess range of motion  - Assess patient's mobility; develop plan if impaired  - Assess patient's need for assistive devices and provide as appropriate  - Encourage maximum independence but intervene and supervise when necessary  - Involve family in performance of ADLs  - Assess for home care needs following discharge   - Consider OT consult to assist with ADL evaluation and planning for discharge  - Provide patient education as appropriate  6/14/2022 0105 by Gus Brantley RN  Outcome: Progressing  6/14/2022 0048 by Gus Brantley RN  Outcome: Progressing  Goal: Maintains/Returns to pre admission functional level  Description: INTERVENTIONS:  - Perform BMAT or MOVE assessment daily    - Set and communicate daily mobility goal to care team and patient/family/caregiver  - Collaborate with rehabilitation services on mobility goals if consulted  - Reposition patient every two hours    - Record patient progress and toleration of activity level   6/14/2022 0105 by Gus Brantley RN  Outcome: Progressing  6/14/2022 0048 by Gus Brantley RN  Outcome: Progressing     Problem: Prexisting or High Potential for Compromised Skin Integrity  Goal: Skin integrity is maintained or improved  Description: INTERVENTIONS:  - Identify patients at risk for skin breakdown  - Assess and monitor skin integrity  - Assess and monitor nutrition and hydration status  - Monitor labs   - Assess for incontinence   - Turn and reposition patient  - Assist with mobility/ambulation  - Relieve pressure over bony prominences  - Avoid friction and shearing  - Provide appropriate hygiene as needed including keeping skin clean and dry  - Evaluate need for skin moisturizer/barrier cream  - Collaborate with interdisciplinary team   - Patient/family teaching  - Consider wound care consult   6/14/2022 0105 by Laura Ku RN  Outcome: Progressing  6/14/2022 0048 by Laura Ku RN  Outcome: Progressing     Problem: Nutrition/Hydration-ADULT  Goal: Nutrient/Hydration intake appropriate for improving, restoring or maintaining nutritional needs  Description: Monitor and assess patient's nutrition/hydration status for malnutrition  Collaborate with interdisciplinary team and initiate plan and interventions as ordered  Monitor patient's weight and dietary intake as ordered or per policy  Utilize nutrition screening tool and intervene as necessary  Determine patient's food preferences and provide high-protein, high-caloric foods as appropriate       INTERVENTIONS:  - Monitor oral intake, urinary output, labs, and treatment plans  - Assess nutrition and hydration status and recommend course of action  - Evaluate amount of meals eaten  - Assist patient with eating if necessary   - Allow adequate time for meals  - Recommend/ encourage appropriate diets, oral nutritional supplements, and vitamin/mineral supplements  - Order, calculate, and assess calorie counts as needed  - Recommend, monitor, and adjust tube feedings and TPN/PPN based on assessed needs  - Assess need for intravenous fluids  - Provide specific nutrition/hydration education as appropriate  - Include patient/family/caregiver in decisions related to nutrition  6/14/2022 0105 by Laura Ku RN  Outcome: Progressing  6/14/2022 0048 by Laura Ku RN  Outcome: Progressing

## 2022-06-14 NOTE — PLAN OF CARE
Problem: Potential for Falls  Goal: Patient will remain free of falls  Description: INTERVENTIONS:  - Educate patient/family on patient safety including physical limitations  - Instruct patient to call for assistance with activity   - Consult OT/PT to assist with strengthening/mobility   - Keep Call bell within reach  - Keep bed low and locked with side rails adjusted as appropriate  - Keep care items and personal belongings within reach  - Initiate and maintain comfort rounds  - Make Fall Risk Sign visible to staff  - Offer Toileting in advance of need  - Initiate/Maintain bed alarm  - Obtain necessary fall risk management equipment:   - Apply yellow socks and bracelet for high fall risk patients  - Consider moving patient to room near nurses station  Outcome: Progressing     Problem: PAIN - ADULT  Goal: Verbalizes/displays adequate comfort level or baseline comfort level  Description: Interventions:  - Encourage patient to monitor pain and request assistance  - Assess pain using appropriate pain scale  - Administer analgesics based on type and severity of pain and evaluate response  - Implement non-pharmacological measures as appropriate and evaluate response  - Consider cultural and social influences on pain and pain management  - Notify physician/advanced practitioner if interventions unsuccessful or patient reports new pain  Outcome: Progressing     Problem: INFECTION - ADULT  Goal: Absence or prevention of progression during hospitalization  Description: INTERVENTIONS:  - Assess and monitor for signs and symptoms of infection  - Monitor lab/diagnostic results  - Monitor all insertion sites, i e  indwelling lines, tubes, and drains  - Monitor endotracheal if appropriate and nasal secretions for changes in amount and color  - [place] appropriate cooling/warming therapies per order  - Administer medications as ordered  - Instruct and encourage patient and family to use good hand hygiene technique  - Identify and instruct in appropriate isolation precautions for identified infection/condition  Outcome: Progressing  Goal: Absence of fever/infection during neutropenic period  Description: INTERVENTIONS:  - Monitor WBC    Outcome: Progressing     Problem: SAFETY ADULT  Goal: Maintain or return to baseline ADL function  Description: INTERVENTIONS:  -  Assess patient's ability to carry out ADLs; assess patient's baseline for ADL function and identify physical deficits which impact ability to perform ADLs (bathing, care of mouth/teeth, toileting, grooming, dressing, etc )  - Assess/evaluate cause of self-care deficits   - Assess range of motion  - Assess patient's mobility; develop plan if impaired  - Assess patient's need for assistive devices and provide as appropriate  - Encourage maximum independence but intervene and supervise when necessary  - Involve family in performance of ADLs  - Assess for home care needs following discharge   - Consider OT consult to assist with ADL evaluation and planning for discharge  - Provide patient education as appropriate  Outcome: Progressing  Goal: Maintains/Returns to pre admission functional level  Description: INTERVENTIONS:  - Perform BMAT or MOVE assessment daily    - Set and communicate daily mobility goal to care team and patient/family/caregiver  - Collaborate with rehabilitation services on mobility goals if consulted  - Reposition patient every two hours    - Record patient progress and toleration of activity level   Outcome: Progressing     Problem: DISCHARGE PLANNING  Goal: Discharge to home or other facility with appropriate resources  Description: INTERVENTIONS:  - Identify barriers to discharge w/patient and caregiver  - Arrange for needed discharge resources and transportation as appropriate  - Identify discharge learning needs (meds, wound care, etc )  - Refer to Case Management Department for coordinating discharge planning if the patient needs post-hospital services based on physician/advanced practitioner order or complex needs related to functional status, cognitive ability, or social support system  Outcome: Progressing     Problem: Knowledge Deficit  Goal: Patient/family/caregiver demonstrates understanding of disease process, treatment plan, medications, and discharge instructions  Description: Complete learning assessment and assess knowledge base    Interventions:  - Provide teaching at level of understanding  - Provide teaching via preferred learning methods  Outcome: Progressing     Problem: SKIN/TISSUE INTEGRITY - ADULT  Goal: Skin Integrity remains intact(Skin Breakdown Prevention)  Description: Assess:  -Perform Gucci assessment every shift  -Clean and moisturize skin   -Inspect skin when repositioning, toileting, and assisting with ADLS  -Assess under medical devices   -Assess extremities for adequate circulation and sensation     Bed Management:  -Have minimal linens on bed & keep smooth, unwrinkled  -Change linens as needed when moist or perspiring  -Avoid sitting or lying in one position while in bed    Toileting:  -Offer bedside commode  -Assess for incontinence   -Use incontinent care products after each incontinent episode    Activity:  -Encourage or provide ROM exercises   -Turn and reposition patient every two Hours  -Use appropriate equipment to lift or move patient in bed  -Instruct/ Assist with weight shifting when out of bed in chair  -Consider limitation of chair time     Skin Care:  -Avoid use of baby powder, tape, friction and shearing, hot water or constrictive clothing  -Relieve pressure over bony prominences  -Do not massage red bony areas    Next Steps:  -Teach patient strategies to minimize risks    -Consider consults to  interdisciplinary teams   Outcome: Progressing  Goal: Incision(s), wounds(s) or drain site(s) healing without S/S of infection  Description: INTERVENTIONS  - Assess and document dressing, incision, wound bed, drain sites and surrounding tissue  - Provide patient and family education  - Perform skin care/dressing changes per order  Outcome: Progressing  Goal: Pressure injury heals and does not worsen  Description: Interventions:  - Implement low air loss mattress or specialty surface (Criteria met)  - Apply silicone foam dressing  - Instruct/assist with weight shifting when in chair   - Limit chair time   - Use special pressure reducing interventions  when in chair   - Apply fecal or urinary incontinence containment device   - Perform passive or active ROM   - Turn and reposition patient & offload bony prominences every two hours   - Utilize friction reducing device or surface for transfers   - Consider consults to  interdisciplinary teams   - Use incontinent care products after each incontinent episode  - Consider nutrition services referral as needed  Outcome: Progressing     Problem: MOBILITY - ADULT  Goal: Maintain or return to baseline ADL function  Description: INTERVENTIONS:  -  Assess patient's ability to carry out ADLs; assess patient's baseline for ADL function and identify physical deficits which impact ability to perform ADLs (bathing, care of mouth/teeth, toileting, grooming, dressing, etc )  - Assess/evaluate cause of self-care deficits   - Assess range of motion  - Assess patient's mobility; develop plan if impaired  - Assess patient's need for assistive devices and provide as appropriate  - Encourage maximum independence but intervene and supervise when necessary  - Involve family in performance of ADLs  - Assess for home care needs following discharge   - Consider OT consult to assist with ADL evaluation and planning for discharge  - Provide patient education as appropriate  Outcome: Progressing  Goal: Maintains/Returns to pre admission functional level  Description: INTERVENTIONS:  - Perform BMAT or MOVE assessment daily    - Set and communicate daily mobility goal to care team and patient/family/caregiver     - Collaborate with rehabilitation services on mobility goals if consulted  - Reposition patient every two hours  - Record patient progress and toleration of activity level   Outcome: Progressing     Problem: Prexisting or High Potential for Compromised Skin Integrity  Goal: Skin integrity is maintained or improved  Description: INTERVENTIONS:  - Identify patients at risk for skin breakdown  - Assess and monitor skin integrity  - Assess and monitor nutrition and hydration status  - Monitor labs   - Assess for incontinence   - Turn and reposition patient  - Assist with mobility/ambulation  - Relieve pressure over bony prominences  - Avoid friction and shearing  - Provide appropriate hygiene as needed including keeping skin clean and dry  - Evaluate need for skin moisturizer/barrier cream  - Collaborate with interdisciplinary team   - Patient/family teaching  - Consider wound care consult   Outcome: Progressing     Problem: Nutrition/Hydration-ADULT  Goal: Nutrient/Hydration intake appropriate for improving, restoring or maintaining nutritional needs  Description: Monitor and assess patient's nutrition/hydration status for malnutrition  Collaborate with interdisciplinary team and initiate plan and interventions as ordered  Monitor patient's weight and dietary intake as ordered or per policy  Utilize nutrition screening tool and intervene as necessary  Determine patient's food preferences and provide high-protein, high-caloric foods as appropriate       INTERVENTIONS:  - Monitor oral intake, urinary output, labs, and treatment plans  - Assess nutrition and hydration status and recommend course of action  - Evaluate amount of meals eaten  - Assist patient with eating if necessary   - Allow adequate time for meals  - Recommend/ encourage appropriate diets, oral nutritional supplements, and vitamin/mineral supplements  - Order, calculate, and assess calorie counts as needed  - Recommend, monitor, and adjust tube feedings and TPN/PPN based on assessed needs  - Assess need for intravenous fluids  - Provide specific nutrition/hydration education as appropriate  - Include patient/family/caregiver in decisions related to nutrition  Outcome: Progressing

## 2022-06-14 NOTE — DISCHARGE INSTR - AVS FIRST PAGE
Dear Layne Sage,     It was our pleasure to care for you here at [facility], [place]  It is our hope that we were always able to exceed the expected standards for your care during your stay  You were hospitalized due to panniculitis  You were cared for on the  2nd floor by ORACIO Jalloh with the [facility] who covers for your primary care physician (PCP), ORACIO Ritchie, while you were hospitalized  If you have any questions or concerns related to this hospitalization, you may contact us at [phone]  For follow up as well as any medication refills, we recommend that you follow up with your primary care physician  A registered nurse will reach out to you by phone within a few days after your discharge to answer any additional questions that you may have after going home  However, at this time we provide for you here, the most important instructions / recommendations at discharge:     Notable Medication Adjustments -   Please hold Lasix until discuss with Dr Radha Ibarra Required after Discharge -   BMP and urine studies on 6/16/22  Important follow up information -   Follow up with PCP and nephrology  Other Instructions -   Please contact lymphedema clinic    Please refer to discharge instructions  Please review this entire after visit summary as additional general instructions including medication list, appointments, activity, diet, any pertinent wound care, and other additional recommendations from your care team that may be provided for you        Sincerely,     ORACIO Jalloh

## 2022-06-14 NOTE — ASSESSMENT & PLAN NOTE
Wt Readings from Last 3 Encounters:   06/14/22 (!) 211 kg (464 lb 8 2 oz)   05/08/22 (!) 212 kg (467 lb)   05/06/22 (!) 193 kg (425 lb)     · Diuresed for lower extremity swelling with IV Lasix  · Monitor BMP, diuretics on hold at this time due to LARRY   · Continue fluid restriction, low-sodium diet

## 2022-06-14 NOTE — ASSESSMENT & PLAN NOTE
· Chronic lymphedema with panniculitis  · Treated with IV Lasix   · Will need to follow up with lymphedema clinic outpatient

## 2022-06-14 NOTE — PROGRESS NOTES
Progress Note - Nephrology   Edmund Horn 80 y o  female MRN: [id number]  Unit/Bed#: -01 Encounter: [id number]    A/P:  1  Acute kidney injury on top of chronic kidney disease                  creatinine slightly higher today, from 2 37 up to 2 56 mg/dL  Will check postvoid residual, will also repeat urine studies  Patient is otherwise stable, she is scheduled to be discharged today at 10:00 a m  which is okay from the renal standpoint  Will follow-up blood work on Thursday, those labs are in the chart  Patient was made aware that if creatinine does not improve she may need to be readmitted  2  Chronic kidney disease stage 2 with baseline creatinine between 1-1 1 mg/dL  3  Hypovolemic hyponatremia                  patient appears to be euvolemic, however given body habitus she is very difficult to assess  Would continue to avoid diuretics at this time  4  Volume depletion                  continue to hold diuretics at this time   5  Panniculitis                  status post antibiotic treatment  6   Severe morbid obesity with BMI of 82   7  Chronic diastolic congestive heart failure   Is stable/compensated at this time    Follow up reason for today's visit:  Acute kidney injury/chronic kidney disease/hypernatremia    Panniculitis    Patient Active Problem List   Diagnosis    Other specified hypothyroidism    Mild episode of recurrent major depressive disorder ([facility] )    Idiopathic chronic 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    Primary osteoarthritis involving multiple joints    Class 3 severe obesity due to excess calories with serious comorbidity and body mass index (BMI) greater than or equal to 70 in adult [facility])    Chronic diastolic congestive heart failure (HCC)    Panniculitis    Gastroesophageal reflux disease without esophagitis    Lymphedema of extremity    Adjustment disorder    Ambulatory dysfunction    Anemia    Carpal tunnel syndrome    Acute kidney injury superimposed on CKD ([facility] )    Depression with anxiety    Gout    Hx MRSA infection    Hyperlipidemia    Irritable bowel syndrome    Left atrial enlargement    Left ovarian cyst    Left ventricular hypertrophy    Osteoarthritis    Symptomatic menopausal or female climacteric states    C  difficile colitis    History of Clostridioides difficile infection    Chronic atrial fibrillation (HCC)    Positive blood culture    [facility] 70 and over[facility]    Abdominal wall cellulitis    Acute pain of left shoulder         Subjective:   New complaints, patient is eating and drinking with no nausea vomiting  Objective:     Vitals: Blood pressure 119/64, pulse 72, temperature 97 7 °F (36 5 °C), resp  rate 17, height 5' 2" (1 575 m), weight (!) 211 kg (464 lb 8 2 oz), SpO2 95 %  ,Body mass index is 84 96 kg/m²  Weight (last 2 days)     Date/Time Weight    06/14/22 0553 211 (464 51)    06/13/22 0549 210 (462 3)    06/12/22 0600 207 (456 79)            Intake/Output Summary (Last 24 hours) at 6/14/2022 0858  Last data filed at 6/13/2022 1700  Gross per 24 hour   Intake 720 ml   Output --   Net 720 ml     I/O last 3 completed shifts:   In: 5 [P O :720]  Out: -          Physical Exam: /64   Pulse 72   Temp 97 7 °F (36 5 °C)   Resp 17   Ht 5' 2" (1 575 m)   Wt (!) 211 kg (464 lb 8 2 oz)   LMP  (LMP Unknown)   SpO2 95%   BMI 84 96 kg/m²     General Appearance:    Alert, cooperative, no distress, appears stated age   Head:    Normocephalic, without obvious abnormality, atraumatic   Eyes:    Conjunctiva/corneas clear   Ears:    Normal external ears   Nose:   Nares normal, septum midline, mucosa normal, no drainage    or sinus tenderness   Throat:   Lips, mucosa, and tongue normal; teeth and gums normal   Neck:   Supple   Back:     Symmetric, no curvature, ROM normal, no CVA tenderness   Lungs:     Clear to auscultation bilaterally, respirations unlabored   Chest wall:    No tenderness or deformity   Heart: Regular rate and rhythm, S1 and S2 normal, no murmur, rub   or gallop   Abdomen:     Soft, non-tender, bowel sounds active   Extremities:   Extremities normal, atraumatic, no cyanosis or edema   Skin:   Skin color, texture, turgor normal, no rashes or lesions   Lymph nodes:   Cervical normal   Neurologic:   CNII-XII intact            Lab, Imaging and other studies: I have personally reviewed pertinent labs  CBC: No results found for: WBC, HGB, HCT, MCV, PLT, ADJUSTEDWBC, MCH, MCHC, RDW, MPV, NRBC  CMP:   Lab Results   Component Value Date    K 4 6 06/14/2022    CL 98 06/14/2022    CO2 27 06/14/2022    BUN 70 (H) 06/14/2022    CREATININE 2 56 (H) 06/14/2022    CALCIUM 8 8 06/14/2022    EGFR 18 06/14/2022         Results from last 7 days   Lab Units 06/14/22  0531 06/13/22  0508 06/12/22  0534   POTASSIUM mmol/L 4 6 4 6 4 7   CHLORIDE mmol/L 98 98 98   CO2 mmol/L 27 26 26   BUN mg/dL 70* 64* 61*   CREATININE mg/dL 2 56* 2 37* 2 30*   CALCIUM mg/dL 8 8 8 7 8 9         Phosphorus: No results found for: PHOS  Magnesium: No results found for: MG  Urinalysis: No results found for: Graydon Juliano, SPECGRAV, PHUR, LEUKOCYTESUR, NITRITE, PROTEINUA, GLUCOSEU, KETONESU, BILIRUBINUR, BLOODU  Ionized Calcium: No results found for: CAION  Coagulation:   Lab Results   Component Value Date    INR 2 54 (H) 06/14/2022     Troponin: No results found for: TROPONINI  ABG: No results found for: PHART, UUI4LWL, PO2ART, BRO8RFK, S1DNMJPM, BEART, SOURCE  Radiology review:     IMAGING  Procedure: XR shoulder 2+ vw left    Result Date: 6/13/2022  Narrative: LEFT SHOULDER INDICATION:   pain  COMPARISON:  None VIEWS:  XR SHOULDER 2+ VW LEFT FINDINGS: There is no acute fracture or dislocation  Moderate osteoarthritis of the glenohumeral and acromioclavicular joints  No lytic or blastic osseous lesion  Soft tissues are unremarkable  Impression: No acute osseous abnormality   Workstation performed: [id number]       Current Facility-Administered Medications   Medication Dose Route Frequency    acetaminophen (TYLENOL) tablet 650 mg  650 mg Oral Q4H PRN    allopurinol (ZYLOPRIM) tablet 100 mg  100 mg Oral Daily    DULoxetine (CYMBALTA) delayed release capsule 20 mg  20 mg Oral Daily    levothyroxine tablet 125 mcg  125 mcg Oral Daily    ondansetron (ZOFRAN) injection 4 mg  4 mg Intravenous Q6H PRN    oxyCODONE (ROXICODONE) IR tablet 5 mg  5 mg Oral Q6H PRN    saccharomyces boulardii (FLORASTOR) capsule 250 mg  250 mg Oral BID    triamcinolone (KENALOG) 0 1 % cream   Topical BID    warfarin (COUMADIN) tablet 5 mg  5 mg Oral Every Other Day    And    warfarin (COUMADIN) tablet 2 mg  2 mg Oral Every Other Day     Medications Discontinued During This Encounter   Medication Reason    vancomycin (VANCOCIN) 1,750 mg in sodium chloride 0 9 % 500 mL IVPB     nystatin (MYCOSTATIN) powder     warfarin (COUMADIN) tablet 4 mg     warfarin (COUMADIN) tablet 2 mg     warfarin (COUMADIN) tablet 4 mg     warfarin (COUMADIN) tablet 2 mg     enoxaparin (LOVENOX) subcutaneous injection 60 mg     vancomycin (VANCOCIN) 2,000 mg in sodium chloride 0 9 % 500 mL IVPB     cefepime (MAXIPIME) injection 2,000 mg     vancomycin (VANCOCIN) 1500 mg in sodium chloride 0 9% 250 mL IVPB     vancomycin (VANCOCIN) 1500 mg in sodium chloride 0 9% 250 mL IVPB     vancomycin (VANCOCIN) 1500 mg in sodium chloride 0 9% 250 mL IVPB     vancomycin (VANCOCIN) 1500 mg in sodium chloride 0 9% 250 mL IVPB     vancomycin (VANCOCIN) 1500 mg in sodium chloride 0 9% 250 mL IVPB     furosemide (LASIX) injection 40 mg     warfarin (COUMADIN) tablet 5 mg     warfarin (COUMADIN) tablet 2 mg     furosemide (LASIX) tablet 40 mg     furosemide (LASIX) tablet 40 mg     levofloxacin (LEVAQUIN) tablet 750 mg     sodium chloride 0 9 % infusion     sodium chloride 0 9 % infusion     warfarin (COUMADIN) tablet 5 mg     warfarin (COUMADIN) tablet 2 mg     warfarin (COUMADIN) tablet 2 mg     warfarin (COUMADIN) tablet 5 mg     multi-electrolyte (PLASMALYTE-A/ISOLYTE-S PH 7 4) IV solution        Latosha Pradhan, DO      This progress note was produced in part using a dictation device which may document imprecise wording from author's original intent

## 2022-06-14 NOTE — CASE MANAGEMENT
Case Management Discharge Planning Note    Patient name Benson Pickett  Location [address] 222/-99 MRN [id number]  : 1950 Date 2022       Current Admission Date: 2022  Current Admission Diagnosis:Panniculitis   Patient Active Problem List    Diagnosis Date Noted    Acute pain of left shoulder 2022    Abdominal wall cellulitis     BMI 70 and over, adult ([place] [address] ) 2022    Positive blood culture 2022    Chronic atrial fibrillation ([place] [address] ) 2022    C  difficile colitis 2022    History of Clostridioides difficile infection 2022    Lymphedema of extremity 2021    Other specified hypothyroidism 10/03/2020    Mild episode of recurrent major depressive disorder ([place] [address] ) 10/03/2020    Idiopathic chronic 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 10/03/2020    Primary osteoarthritis involving multiple joints 10/03/2020    Class 3 severe obesity due to excess calories with serious comorbidity and body mass index (BMI) greater than or equal to 70 in adult [facility]) 10/03/2020    Chronic diastolic congestive heart failure ([place] [address] ) 10/03/2020    Panniculitis 10/03/2020    Gastroesophageal reflux disease without esophagitis 10/03/2020    Hx MRSA infection 2020    Left ovarian cyst 2017    Depression with anxiety 07/15/2017    Anemia 2016    Ambulatory dysfunction 2016    Acute kidney injury superimposed on CKD ([place] [address] ) 2016    Adjustment disorder 2013    Irritable bowel syndrome 2012    Left atrial enlargement 2012    Left ventricular hypertrophy 2012    Carpal tunnel syndrome 2012    Gout 2012    Hyperlipidemia 2012    Osteoarthritis 2012    Symptomatic menopausal or female climacteric states 2012      LOS (days): 18  Geometric Mean LOS (GMLOS) (days): 3 20  Days to GMLOS:-14 3     OBJECTIVE:  Risk of Unplanned Readmission Score: 42 45         Current admission status: Inpatient   Preferred Pharmacy:   [address] [place] [place] [facility]  [address] [facility] [address]  [place] [address]  Phone: [phone] Fax: [phone] [address] [facility], [address]  [address]  [facility] [address]  Phone: [phone] Fax: [phone]    Primary Care Provider: ORACIO Mtz    Primary Insurance: MEDICARE  Secondary Insurance: AARP    DISCHARGE DETAILS:    Discharge planning discussed with[de-identified] patient  left a mesage for  Rachelle at 10:54 and at 11:07 am she called me back  Freedom of Choice: Yes  Comments - Freedom of Choice: pt will be seen by her crnp on 6/15/2022 and she stated that her crnp will order hhc ofr her if needed  CM contacted family/caregiver?: Yes  Were Treatment Team discharge recommendations reviewed with patient/caregiver?: Yes  Did patient/caregiver verbalize understanding of patient care needs?: Yes  Were patient/caregiver advised of the risks associated with not following Treatment Team discharge recommendations?: Yes    Contacts  Patient Contacts: Sp Jacobs  Relationship to Patient[de-identified] Family (niece)  Contact Method: Phone  Phone Number: [phone]  Reason/Outcome: Discharge [address]         Is the patient interested in [place] [address] at discharge?: No    DME Referral Provided  Referral made for DME?: No    Other Referral/Resources/Interventions Provided:  Referral Comments: pt denied any additonal needs  she sated her crnp will order hhc if needed    Would you like to participate in our [address] service program?  : No - Declined    Treatment Team Recommendation: Home (home with caregivers  and house call - bls 10am)  Discharge Destination Plan[de-identified] Home (home with caregivers and house call on 6/15/2022)  Transport at Discharge : [facility]  Dispatcher Contacted: Yes  Number/Name of Dispatcher: [phone]  Transported by [place] and Unit #):  [place]  ETA of Transport (Date): 06/14/22  ETA of Transport (Time): 1000     Transfer Mode: Stretcher              Family notified[de-identified] soke to niece she did call wilfred muñoz  Additional Comments: wilfred placed a call to Matth[facility]  [phone] at 08:25 am and I spoke with Italo Chaudhary and she stad to me that the caregiver will be at the home to recieve the pt  ,they are aware of the 10 am transport

## 2022-06-14 NOTE — ASSESSMENT & PLAN NOTE
Recently hospitalized at [facility] who re-presented for recurrent cellulitis of pannus in setting of morbid obesity, chronic lymphedema, increased moisture and superficial skin breakdown  · Wound culture with pseudomonas and proteus  · Cefepime 2gm q8 completed on 6/10 with PO vancomycin prophylaxis for C diff completed on 6/13  · Surgery input appreciated- no surgical intervention or abscess noted  · Continue aggressive skin care with minimizing friction and moisture related skin breakdown   Frequent offloading and repositioning    · Recommend that patient be evaluated by outpatient for chronic lymphedema

## 2022-07-07 ENCOUNTER — TELEPHONE (OUTPATIENT)
Dept: NEPHROLOGY | Facility: CLINIC | Age: 72
End: 2022-07-07

## 2022-07-07 NOTE — TELEPHONE ENCOUNTER
Called and spoke with patient to reminder her to have lab work done prior to appointment  She asked if we could fax her lab slip over to Dr Asif Bettencourt office        Faxed to [phone]  Phone- [phone]

## 2022-07-18 ENCOUNTER — OFFICE VISIT (OUTPATIENT)
Dept: NEPHROLOGY | Facility: CLINIC | Age: 72
End: 2022-07-18
Payer: MEDICARE

## 2022-07-18 DIAGNOSIS — A04.72 C. DIFFICILE COLITIS: ICD-10-CM

## 2022-07-18 DIAGNOSIS — N18.9 ACUTE KIDNEY INJURY SUPERIMPOSED ON CKD (HCC): Primary | ICD-10-CM

## 2022-07-18 DIAGNOSIS — I50.32 CHRONIC DIASTOLIC CONGESTIVE HEART FAILURE (HCC): Chronic | ICD-10-CM

## 2022-07-18 DIAGNOSIS — E66.01 CLASS 3 SEVERE OBESITY DUE TO EXCESS CALORIES WITH SERIOUS COMORBIDITY AND BODY MASS INDEX (BMI) GREATER THAN OR EQUAL TO 70 IN ADULT (HCC): ICD-10-CM

## 2022-07-18 DIAGNOSIS — M15.9 PRIMARY OSTEOARTHRITIS INVOLVING MULTIPLE JOINTS: ICD-10-CM

## 2022-07-18 DIAGNOSIS — I89.0 LYMPHEDEMA OF EXTREMITY: Chronic | ICD-10-CM

## 2022-07-18 DIAGNOSIS — N17.9 ACUTE KIDNEY INJURY SUPERIMPOSED ON CKD (HCC): Primary | ICD-10-CM

## 2022-07-18 PROCEDURE — 99214 OFFICE O/P EST MOD 30 MIN: CPT | Performed by: PHYSICIAN ASSISTANT

## 2022-07-18 RX ORDER — FUROSEMIDE 40 MG/1
40 TABLET ORAL DAILY
COMMUNITY

## 2022-07-18 NOTE — ASSESSMENT & PLAN NOTE
Wt Readings from Last 3 Encounters:   06/14/22 (!) 211 kg (464 lb 8 2 oz)   05/08/22 (!) 212 kg (467 lb)   05/06/22 (!) 193 kg (425 lb)   Examines compensated  Reports caregiver uses no salt in cooking  Continue low sodium diet and furosemide 40 mg once daily  Discussed with patient to hold this if she experiences poor p o  Intake or GI losses

## 2022-07-18 NOTE — PROGRESS NOTES
Assessment & Plan:    1  Acute kidney injury superimposed on CKD [facility])  Assessment & Plan:  Lab Results   Component Value Date    EGFR 18 06/14/2022    EGFR 20 06/13/2022    EGFR 20 06/12/2022    CREATININE 2 56 (H) 06/14/2022    CREATININE 2 37 (H) 06/13/2022    CREATININE 2 30 (H) 06/12/2022   Renal function has improved from discharge GFR 18%, improved to creatinine 1 5 mg/dL with estimated GFR 35 mL/min on 06/30/2022  Continue to avoid nephrotoxins  Discussed with patient that she should hold loop diuretic furosemide if she experiences GI losses or poor p o  Intake for the prevention of acute kidney injury secondary to volume depletion  Monitor labs in 1 month return to clinic in 3 months  Orders:  -     Comprehensive metabolic panel; Future; Expected date: 10/10/2022  -     CBC and differential; Future; Expected date: 10/10/2022  -     Basic metabolic panel; Future; Expected date: 08/18/2022    2  C  difficile colitis  Assessment & Plan:  Has been on oral vancomycin      3  Chronic diastolic congestive heart failure [facility])  Assessment & Plan:  Wt Readings from Last 3 Encounters:   06/14/22 (!) 211 kg (464 lb 8 2 oz)   05/08/22 (!) 212 kg (467 lb)   05/06/22 (!) 193 kg (425 lb)   Examines compensated  Reports caregiver uses no salt in cooking  Continue low sodium diet and furosemide 40 mg once daily  Discussed with patient to hold this if she experiences poor p o  Intake or GI losses  4  Primary osteoarthritis involving multiple joints  Assessment & Plan:  Avoid NSAIDs  5  Lymphedema of extremity  Assessment & Plan:  Low-sodium diet, furosemide, lymphedema clinic      6  Class 3 severe obesity due to excess calories with serious comorbidity and body mass index (BMI) greater than or equal to 70 in adult [facility])  Assessment & Plan:  BMI 84 9 kg per meters squared   BMI greater than 40 is associated with greater risk of progression of renal disease secondary to glomerular hyperfiltration  Recommend weight loss  The benefits, risks and alternatives to the treatment plan were discussed at this visit  Patient was advised of common adverse effects of any medical therapies prescribed  All questions were answered and discussed with the patient and any accompanying family members or caretakers  Subjective:      Patient ID: Calixto Juárez is a 80 y o  female seen in the [facility] [place]     Today, patient presents for follow-up of hospitalization  Patient presents without acute complaints  She reports that she has restarted her furosemide 40 mg daily  She denies any swelling in her legs  She denies any poor p o  Intake, nausea, vomiting, trouble with urination  She has had some diarrhea and is on oral vancomycin due to history of C diff colitis  She has been residing at home  She has a caretaker with her 12 hours per day 7 days per week  EMS reports that blood pressure in route was 102/70  Oxygen saturation 99%  Vital signs are difficult to take due to body habitus with BMI around 85  We reviewed and discussed results of labs performed 06/30/2022 which revealed that renal function has improved to creatinine of 1 58 mg/dL with estimated GFR 35 mL/min  This is compared with discharge creatinine 2 5 mg/dL with estimated GFR 18 mL/min on 06/14/2022  History is obtained from patient and EMS transport  The following portions of the patient's history were reviewed and updated as appropriate: allergies, current medications, past family history, past medical history, past social history, past surgical history, and problem list     Review of Systems   Constitutional: Negative for activity change, appetite change, chills, fatigue, fever and unexpected weight change  Respiratory: Negative for apnea, cough and shortness of breath  Cardiovascular: Negative for chest pain, palpitations and leg swelling  Gastrointestinal: Positive for diarrhea   Negative for abdominal pain, blood in stool, constipation, nausea and vomiting  Genitourinary: Negative for decreased urine volume, difficulty urinating, dysuria, flank pain, frequency, hematuria and urgency  Musculoskeletal: Positive for gait problem (requires stretcher transport)  Negative for arthralgias, back pain, joint swelling and myalgias  Skin: Negative for color change and rash  Neurological: Negative for dizziness, seizures, syncope, weakness, light-headedness, numbness and headaches  Hematological: Negative for adenopathy  Does not bruise/bleed easily  Psychiatric/Behavioral: Negative for agitation, behavioral problems, confusion, decreased concentration, dysphoric mood and hallucinations  The patient is not nervous/anxious and is not hyperactive  All other systems reviewed and are negative  Objective:      LMP  (LMP Unknown)          Physical Exam  Vitals and nursing note reviewed  Exam conducted with a chaperone present  Constitutional:       General: She is not in acute distress  Appearance: Normal appearance  She is morbidly obese  She is not ill-appearing or toxic-appearing  Comments: On stretcher transport   HENT:      Head: Normocephalic and atraumatic  Nose: Nose normal  No congestion or rhinorrhea  Mouth/Throat:      Mouth: Mucous membranes are moist       Pharynx: Oropharynx is clear  Eyes:      General: No scleral icterus  Right eye: No discharge  Left eye: No discharge  Extraocular Movements: Extraocular movements intact  Conjunctiva/sclera: Conjunctivae normal       Pupils: Pupils are equal, round, and reactive to light  Cardiovascular:      Rate and Rhythm: Normal rate and regular rhythm  Pulses: Normal pulses  Heart sounds: Normal heart sounds  No murmur heard  Comments: Adiposity but no pitting edema  Pulmonary:      Effort: Pulmonary effort is normal  No respiratory distress        Breath sounds: Normal breath sounds  No wheezing  Abdominal:      General: Abdomen is flat  Bowel sounds are normal  There is no distension  Palpations: Abdomen is soft  There is no mass  Tenderness: There is no abdominal tenderness  Musculoskeletal:         General: No swelling or deformity  Normal range of motion  Cervical back: Normal range of motion and neck supple  No rigidity or tenderness  Skin:     General: Skin is warm and dry  Coloration: Skin is not jaundiced or pale  Findings: No bruising  Neurological:      General: No focal deficit present  Mental Status: She is alert and oriented to person, place, and time  Mental status is at baseline  Psychiatric:         Mood and Affect: Mood normal          Behavior: Behavior normal          Thought Content:  Thought content normal          Judgment: Judgment normal              Lab Results   Component Value Date     05/19/2018    SODIUM 133 (L) 06/14/2022    K 4 6 06/14/2022    CL 98 06/14/2022    CO2 27 06/14/2022    ANIONGAP 12 5 05/19/2018    AGAP 8 06/14/2022    BUN 70 (H) 06/14/2022    CREATININE 2 56 (H) 06/14/2022    GLUC 88 06/14/2022    GLUF 111 (H) 04/19/2022    CALCIUM 8 8 06/14/2022    AST 19 06/05/2022    ALT 14 06/05/2022    ALKPHOS 91 06/05/2022    PROT 7 5 05/19/2018    TP 7 1 06/05/2022    BILITOT 0 8 05/19/2018    TBILI 0 73 06/05/2022    EGFR 18 06/14/2022      Lab Results   Component Value Date    CREATININE 2 56 (H) 06/14/2022    CREATININE 2 37 (H) 06/13/2022    CREATININE 2 30 (H) 06/12/2022    CREATININE 2 07 (H) 06/11/2022    CREATININE 1 79 (H) 06/10/2022    CREATININE 1 56 (H) 06/09/2022    CREATININE 1 46 (H) 06/08/2022    CREATININE 1 44 (H) 06/07/2022    CREATININE 1 35 (H) 06/06/2022    CREATININE 1 29 06/05/2022    CREATININE 1 19 06/04/2022    CREATININE 1 11 06/03/2022    CREATININE 1 21 06/02/2022    CREATININE 1 08 06/01/2022    CREATININE 1 03 05/31/2022      Lab Results   Component Value Date    COLORU Yellow 06/09/2022    CLARITYU Slightly Cloudy (A) 06/09/2022    SPECGRAV 1 020 06/09/2022    PHUR 5 5 06/09/2022    LEUKOCYTESUR Negative 06/09/2022    NITRITE Negative 06/09/2022    PROTEIN UA 1+ (A) 06/09/2022    GLUCOSEU Negative 06/09/2022    KETONESU Negative 06/09/2022    UROBILINOGEN 0 2 06/09/2022    BILIRUBINUR Negative 06/09/2022    BLOODU 1+ (A) 06/09/2022    RBCUA 1-2 (A) 06/09/2022    WBCUA 0-1 (A) 06/09/2022    EPIS Moderate (A) 06/09/2022    BACTERIA Moderate (A) 06/09/2022      No results found for: LABPROT  No results found for: Vidal Alcocer  Lab Results   Component Value Date    WBC 8 89 06/13/2022    HGB 9 8 (L) 06/13/2022    HCT 33 3 (L) 06/13/2022    MCV 82 06/13/2022     06/13/2022      Lab Results   Component Value Date    HGB 9 8 (L) 06/13/2022    HGB 10 0 (L) 06/12/2022    HGB 9 9 (L) 06/11/2022    HGB 10 0 (L) 06/10/2022    HGB 10 3 (L) 06/09/2022      No results found for: IRON, TIBC, FERRITIN   No results found for: PTHCALCIUM, [id number], PHOSPHORUS   No results found for: CHOLESTEROL, HDL, LDLCALC, TRIG   No results found for: URICACID   Lab Results   Component Value Date    HGBA1C 7 1 (H) 08/30/2021      No results found for: TSHANTIBODY, C9WOVTH, FREET4   No results found for: CARLO, DSDNAAB, RFIGM   Lab Results   Component Value Date    PROT 7 5 05/19/2018        Portions of the record may have been created with voice recognition software  Occasional wrong word or "sound a like" substitutions may have occurred due to the inherent limitations of voice recognition software  Read the chart carefully and recognize, using context, where substitutions have occurred  If you have any questions, please contact the dictating provider

## 2022-07-18 NOTE — ASSESSMENT & PLAN NOTE
BMI 84 9 kg per meters squared   BMI greater than 40 is associated with greater risk of progression of renal disease secondary to glomerular hyperfiltration  Recommend weight loss

## 2022-07-18 NOTE — ASSESSMENT & PLAN NOTE
Lab Results   Component Value Date    EGFR 18 06/14/2022    EGFR 20 06/13/2022    EGFR 20 06/12/2022    CREATININE 2 56 (H) 06/14/2022    CREATININE 2 37 (H) 06/13/2022    CREATININE 2 30 (H) 06/12/2022   Renal function has improved from discharge GFR 18%, improved to creatinine 1 5 mg/dL with estimated GFR 35 mL/min on 06/30/2022  Continue to avoid nephrotoxins  Discussed with patient that she should hold loop diuretic furosemide if she experiences GI losses or poor p o  Intake for the prevention of acute kidney injury secondary to volume depletion  Monitor labs in 1 month return to clinic in 3 months

## 2022-07-18 NOTE — PATIENT INSTRUCTIONS
For the prevention of kidney injury: If you are sick and not eating well or drinking well OR vomiting or having diarrhea, temporarily hold your ACE-inhibitor / ARB / diuretic  (FUROSEMIDE/ LASIX) while sick and call office for further instructions  Otherwise, please take medications as prescribed

## 2022-07-25 ENCOUNTER — HOSPITAL ENCOUNTER (INPATIENT)
Facility: HOSPITAL | Age: 72
LOS: 10 days | Discharge: HOME HEALTH | DRG: 607 | End: 2022-08-05
Attending: INTERNAL MEDICINE | Admitting: HOSPITALIST
Payer: MEDICARE

## 2022-07-25 DIAGNOSIS — I48.91 ATRIAL FIBRILLATION, UNSPECIFIED TYPE (HCC): ICD-10-CM

## 2022-07-25 DIAGNOSIS — L02.412 ABSCESS OF LEFT AXILLA: ICD-10-CM

## 2022-07-25 DIAGNOSIS — Z74.09 IMPAIRED MOBILITY: ICD-10-CM

## 2022-07-25 DIAGNOSIS — I50.32 CHRONIC DIASTOLIC CONGESTIVE HEART FAILURE (HCC): Chronic | ICD-10-CM

## 2022-07-25 DIAGNOSIS — R26.89 FUNCTIONAL GAIT ABNORMALITY: ICD-10-CM

## 2022-07-25 DIAGNOSIS — M79.3 PANNICULITIS: ICD-10-CM

## 2022-07-25 DIAGNOSIS — L03.90 CELLULITIS: Primary | ICD-10-CM

## 2022-07-25 LAB
ALBUMIN SERPL BCP-MCNC: 3 G/DL (ref 3.5–5)
ALP SERPL-CCNC: 134 U/L (ref 34–104)
ALT SERPL W P-5'-P-CCNC: 14 U/L (ref 7–52)
ANION GAP SERPL CALCULATED.3IONS-SCNC: 8 MMOL/L (ref 4–13)
AST SERPL W P-5'-P-CCNC: 27 U/L (ref 13–39)
BASOPHILS # BLD AUTO: 0.05 THOUSANDS/ΜL (ref 0–0.1)
BASOPHILS NFR BLD AUTO: 1 % (ref 0–1)
BILIRUB SERPL-MCNC: 0.74 MG/DL (ref 0.2–1)
BUN SERPL-MCNC: 28 MG/DL (ref 5–25)
CALCIUM ALBUM COR SERPL-MCNC: 9.6 MG/DL (ref 8.3–10.1)
CALCIUM SERPL-MCNC: 8.8 MG/DL (ref 8.4–10.2)
CHLORIDE SERPL-SCNC: 96 MMOL/L (ref 96–108)
CO2 SERPL-SCNC: 28 MMOL/L (ref 21–32)
CREAT SERPL-MCNC: 1.2 MG/DL (ref 0.6–1.3)
EOSINOPHIL # BLD AUTO: 0.31 THOUSAND/ΜL (ref 0–0.61)
EOSINOPHIL NFR BLD AUTO: 3 % (ref 0–6)
ERYTHROCYTE [DISTWIDTH] IN BLOOD BY AUTOMATED COUNT: 17.9 % (ref 11.6–15.1)
GFR SERPL CREATININE-BSD FRML MDRD: 45 ML/MIN/1.73SQ M
GLUCOSE SERPL-MCNC: 120 MG/DL (ref 65–140)
HCT VFR BLD AUTO: 38.7 % (ref 34.8–46.1)
HGB BLD-MCNC: 11.2 G/DL (ref 11.5–15.4)
IMM GRANULOCYTES # BLD AUTO: 0.05 THOUSAND/UL (ref 0–0.2)
IMM GRANULOCYTES NFR BLD AUTO: 1 % (ref 0–2)
LIPASE SERPL-CCNC: 40 U/L (ref 11–82)
LYMPHOCYTES # BLD AUTO: 1.12 THOUSANDS/ΜL (ref 0.6–4.47)
LYMPHOCYTES NFR BLD AUTO: 10 % (ref 14–44)
MCH RBC QN AUTO: 24.7 PG (ref 26.8–34.3)
MCHC RBC AUTO-ENTMCNC: 28.9 G/DL (ref 31.4–37.4)
MCV RBC AUTO: 85 FL (ref 82–98)
MONOCYTES # BLD AUTO: 0.6 THOUSAND/ΜL (ref 0.17–1.22)
MONOCYTES NFR BLD AUTO: 6 % (ref 4–12)
NEUTROPHILS # BLD AUTO: 8.75 THOUSANDS/ΜL (ref 1.85–7.62)
NEUTS SEG NFR BLD AUTO: 79 % (ref 43–75)
NRBC BLD AUTO-RTO: 0 /100 WBCS
PLATELET # BLD AUTO: 406 THOUSANDS/UL (ref 149–390)
PMV BLD AUTO: 8.4 FL (ref 8.9–12.7)
POTASSIUM SERPL-SCNC: 4.9 MMOL/L (ref 3.5–5.3)
PROT SERPL-MCNC: 7.5 G/DL (ref 6.4–8.4)
RBC # BLD AUTO: 4.54 MILLION/UL (ref 3.81–5.12)
SODIUM SERPL-SCNC: 132 MMOL/L (ref 135–147)
WBC # BLD AUTO: 10.88 THOUSAND/UL (ref 4.31–10.16)

## 2022-07-25 PROCEDURE — 80053 COMPREHEN METABOLIC PANEL: CPT | Performed by: INTERNAL MEDICINE

## 2022-07-25 PROCEDURE — 83690 ASSAY OF LIPASE: CPT | Performed by: INTERNAL MEDICINE

## 2022-07-25 PROCEDURE — 36415 COLL VENOUS BLD VENIPUNCTURE: CPT | Performed by: INTERNAL MEDICINE

## 2022-07-25 PROCEDURE — 99285 EMERGENCY DEPT VISIT HI MDM: CPT | Performed by: INTERNAL MEDICINE

## 2022-07-25 PROCEDURE — 93005 ELECTROCARDIOGRAM TRACING: CPT

## 2022-07-25 PROCEDURE — 85025 COMPLETE CBC W/AUTO DIFF WBC: CPT | Performed by: INTERNAL MEDICINE

## 2022-07-25 PROCEDURE — 99285 EMERGENCY DEPT VISIT HI MDM: CPT

## 2022-07-26 PROBLEM — M25.512 ACUTE PAIN OF LEFT SHOULDER: Status: RESOLVED | Noted: 2022-06-11 | Resolved: 2022-07-26

## 2022-07-26 PROBLEM — R26.89 FUNCTIONAL GAIT ABNORMALITY: Status: ACTIVE | Noted: 2019-02-03

## 2022-07-26 PROBLEM — Z74.09 IMPAIRED MOBILITY: Status: ACTIVE | Noted: 2019-02-03

## 2022-07-26 PROBLEM — R78.81 POSITIVE BLOOD CULTURE: Status: RESOLVED | Noted: 2022-05-30 | Resolved: 2022-07-26

## 2022-07-26 PROBLEM — M19.019 OSTEOARTHRITIS OF GLENOHUMERAL JOINT: Status: ACTIVE | Noted: 2019-02-03

## 2022-07-26 PROBLEM — A04.72 C. DIFFICILE COLITIS: Status: RESOLVED | Noted: 2022-01-08 | Resolved: 2022-07-26

## 2022-07-26 LAB
ANION GAP SERPL CALCULATED.3IONS-SCNC: 7 MMOL/L (ref 4–13)
ATRIAL RATE: 86 BPM
BASOPHILS # BLD AUTO: 0.06 THOUSANDS/ΜL (ref 0–0.1)
BASOPHILS NFR BLD AUTO: 1 % (ref 0–1)
BILIRUB UR QL STRIP: NEGATIVE
BUN SERPL-MCNC: 28 MG/DL (ref 5–25)
CALCIUM SERPL-MCNC: 8.4 MG/DL (ref 8.4–10.2)
CHLORIDE SERPL-SCNC: 99 MMOL/L (ref 96–108)
CLARITY UR: CLEAR
CO2 SERPL-SCNC: 27 MMOL/L (ref 21–32)
COLOR UR: YELLOW
CREAT SERPL-MCNC: 1.08 MG/DL (ref 0.6–1.3)
EOSINOPHIL # BLD AUTO: 0.32 THOUSAND/ΜL (ref 0–0.61)
EOSINOPHIL NFR BLD AUTO: 3 % (ref 0–6)
ERYTHROCYTE [DISTWIDTH] IN BLOOD BY AUTOMATED COUNT: 18.1 % (ref 11.6–15.1)
GFR SERPL CREATININE-BSD FRML MDRD: 52 ML/MIN/1.73SQ M
GLUCOSE SERPL-MCNC: 110 MG/DL (ref 65–140)
GLUCOSE UR STRIP-MCNC: NEGATIVE MG/DL
HCT VFR BLD AUTO: 36.1 % (ref 34.8–46.1)
HGB BLD-MCNC: 10.5 G/DL (ref 11.5–15.4)
HGB UR QL STRIP.AUTO: NEGATIVE
IMM GRANULOCYTES # BLD AUTO: 0.04 THOUSAND/UL (ref 0–0.2)
IMM GRANULOCYTES NFR BLD AUTO: 0 % (ref 0–2)
INR PPP: 2.28 (ref 0.84–1.19)
KETONES UR STRIP-MCNC: NEGATIVE MG/DL
LEUKOCYTE ESTERASE UR QL STRIP: NEGATIVE
LYMPHOCYTES # BLD AUTO: 1.46 THOUSANDS/ΜL (ref 0.6–4.47)
LYMPHOCYTES NFR BLD AUTO: 15 % (ref 14–44)
MCH RBC QN AUTO: 24.5 PG (ref 26.8–34.3)
MCHC RBC AUTO-ENTMCNC: 29.1 G/DL (ref 31.4–37.4)
MCV RBC AUTO: 84 FL (ref 82–98)
MONOCYTES # BLD AUTO: 0.55 THOUSAND/ΜL (ref 0.17–1.22)
MONOCYTES NFR BLD AUTO: 6 % (ref 4–12)
NEUTROPHILS # BLD AUTO: 7.34 THOUSANDS/ΜL (ref 1.85–7.62)
NEUTS SEG NFR BLD AUTO: 75 % (ref 43–75)
NITRITE UR QL STRIP: NEGATIVE
NRBC BLD AUTO-RTO: 0 /100 WBCS
PH UR STRIP.AUTO: 5 [PH]
PLATELET # BLD AUTO: 401 THOUSANDS/UL (ref 149–390)
PMV BLD AUTO: 8.5 FL (ref 8.9–12.7)
POTASSIUM SERPL-SCNC: 4.1 MMOL/L (ref 3.5–5.3)
PROCALCITONIN SERPL-MCNC: 0.07 NG/ML
PROT UR STRIP-MCNC: NEGATIVE MG/DL
PROTHROMBIN TIME: 25 SECONDS (ref 11.6–14.5)
QRS AXIS: -22 DEGREES
QRSD INTERVAL: 72 MS
QT INTERVAL: 250 MS
QTC INTERVAL: 286 MS
RBC # BLD AUTO: 4.29 MILLION/UL (ref 3.81–5.12)
SARS-COV-2 RNA RESP QL NAA+PROBE: NEGATIVE
SODIUM SERPL-SCNC: 133 MMOL/L (ref 135–147)
SP GR UR STRIP.AUTO: 1.02 (ref 1–1.03)
T WAVE AXIS: 203 DEGREES
UROBILINOGEN UR QL STRIP.AUTO: 0.2 E.U./DL
VENTRICULAR RATE: 79 BPM
WBC # BLD AUTO: 9.77 THOUSAND/UL (ref 4.31–10.16)

## 2022-07-26 PROCEDURE — 97163 PT EVAL HIGH COMPLEX 45 MIN: CPT

## 2022-07-26 PROCEDURE — 84145 PROCALCITONIN (PCT): CPT | Performed by: HOSPITALIST

## 2022-07-26 PROCEDURE — 87635 SARS-COV-2 COVID-19 AMP PRB: CPT | Performed by: INTERNAL MEDICINE

## 2022-07-26 PROCEDURE — 93010 ELECTROCARDIOGRAM REPORT: CPT | Performed by: INTERNAL MEDICINE

## 2022-07-26 PROCEDURE — 97166 OT EVAL MOD COMPLEX 45 MIN: CPT

## 2022-07-26 PROCEDURE — 99223 1ST HOSP IP/OBS HIGH 75: CPT | Performed by: HOSPITALIST

## 2022-07-26 PROCEDURE — 81003 URINALYSIS AUTO W/O SCOPE: CPT | Performed by: INTERNAL MEDICINE

## 2022-07-26 PROCEDURE — 85610 PROTHROMBIN TIME: CPT | Performed by: PHYSICIAN ASSISTANT

## 2022-07-26 PROCEDURE — 80048 BASIC METABOLIC PNL TOTAL CA: CPT | Performed by: PHYSICIAN ASSISTANT

## 2022-07-26 PROCEDURE — 36415 COLL VENOUS BLD VENIPUNCTURE: CPT | Performed by: HOSPITALIST

## 2022-07-26 PROCEDURE — 85025 COMPLETE CBC W/AUTO DIFF WBC: CPT | Performed by: PHYSICIAN ASSISTANT

## 2022-07-26 RX ORDER — WARFARIN SODIUM 2 MG/1
2 TABLET ORAL
Status: DISCONTINUED | OUTPATIENT
Start: 2022-07-26 | End: 2022-07-26

## 2022-07-26 RX ORDER — ALLOPURINOL 100 MG/1
100 TABLET ORAL DAILY
Status: DISCONTINUED | OUTPATIENT
Start: 2022-07-26 | End: 2022-08-05 | Stop reason: HOSPADM

## 2022-07-26 RX ORDER — WARFARIN SODIUM 4 MG/1
4 TABLET ORAL
COMMUNITY

## 2022-07-26 RX ORDER — ONDANSETRON 2 MG/ML
4 INJECTION INTRAMUSCULAR; INTRAVENOUS EVERY 6 HOURS PRN
Status: DISCONTINUED | OUTPATIENT
Start: 2022-07-26 | End: 2022-08-05 | Stop reason: HOSPADM

## 2022-07-26 RX ORDER — CEFEPIME HYDROCHLORIDE 2 G/50ML
2000 INJECTION, SOLUTION INTRAVENOUS EVERY 12 HOURS
Status: DISCONTINUED | OUTPATIENT
Start: 2022-07-26 | End: 2022-07-27

## 2022-07-26 RX ORDER — OXYCODONE HYDROCHLORIDE 5 MG/1
5 TABLET ORAL EVERY 6 HOURS PRN
Status: DISCONTINUED | OUTPATIENT
Start: 2022-07-26 | End: 2022-08-05 | Stop reason: HOSPADM

## 2022-07-26 RX ORDER — ACETAMINOPHEN 325 MG/1
650 TABLET ORAL EVERY 6 HOURS PRN
Status: DISCONTINUED | OUTPATIENT
Start: 2022-07-26 | End: 2022-08-05 | Stop reason: HOSPADM

## 2022-07-26 RX ORDER — CEFEPIME HYDROCHLORIDE 2 G/50ML
2000 INJECTION, SOLUTION INTRAVENOUS EVERY 12 HOURS
Status: CANCELLED | OUTPATIENT
Start: 2022-07-26

## 2022-07-26 RX ORDER — WARFARIN SODIUM 4 MG/1
4 TABLET ORAL
Status: DISCONTINUED | OUTPATIENT
Start: 2022-07-26 | End: 2022-07-26

## 2022-07-26 RX ORDER — FUROSEMIDE 40 MG/1
40 TABLET ORAL DAILY
Status: DISCONTINUED | OUTPATIENT
Start: 2022-07-26 | End: 2022-08-05 | Stop reason: HOSPADM

## 2022-07-26 RX ORDER — WARFARIN SODIUM 2 MG/1
4 TABLET ORAL
Status: DISCONTINUED | OUTPATIENT
Start: 2022-07-26 | End: 2022-08-05 | Stop reason: HOSPADM

## 2022-07-26 RX ORDER — DULOXETIN HYDROCHLORIDE 20 MG/1
20 CAPSULE, DELAYED RELEASE ORAL DAILY
Status: CANCELLED | OUTPATIENT
Start: 2022-07-26

## 2022-07-26 RX ORDER — VANCOMYCIN HYDROCHLORIDE 50 MG/ML
2.5 KIT ORAL EVERY 6 HOURS
COMMUNITY
Start: 2022-07-14 | End: 2022-08-05

## 2022-07-26 RX ORDER — DULOXETIN HYDROCHLORIDE 20 MG/1
20 CAPSULE, DELAYED RELEASE ORAL DAILY
Status: DISCONTINUED | OUTPATIENT
Start: 2022-07-26 | End: 2022-08-05 | Stop reason: HOSPADM

## 2022-07-26 RX ORDER — CEFEPIME HYDROCHLORIDE 2 G/50ML
2000 INJECTION, SOLUTION INTRAVENOUS ONCE
Status: COMPLETED | OUTPATIENT
Start: 2022-07-26 | End: 2022-07-26

## 2022-07-26 RX ORDER — WARFARIN SODIUM 2 MG/1
2 TABLET ORAL
Status: DISCONTINUED | OUTPATIENT
Start: 2022-07-27 | End: 2022-08-05 | Stop reason: HOSPADM

## 2022-07-26 RX ADMIN — ALLOPURINOL 100 MG: 100 TABLET ORAL at 08:59

## 2022-07-26 RX ADMIN — LEVOTHYROXINE SODIUM 125 MCG: 25 TABLET ORAL at 05:50

## 2022-07-26 RX ADMIN — Medication 125 MG: at 11:27

## 2022-07-26 RX ADMIN — DULOXETINE 20 MG: 20 CAPSULE, DELAYED RELEASE ORAL at 09:02

## 2022-07-26 RX ADMIN — WARFARIN SODIUM 4 MG: 4 TABLET ORAL at 17:26

## 2022-07-26 RX ADMIN — CEFEPIME HYDROCHLORIDE 2000 MG: 2 INJECTION, SOLUTION INTRAVENOUS at 11:26

## 2022-07-26 RX ADMIN — CEFEPIME HYDROCHLORIDE 2000 MG: 2 INJECTION, SOLUTION INTRAVENOUS at 00:58

## 2022-07-26 RX ADMIN — Medication 125 MG: at 21:37

## 2022-07-26 RX ADMIN — FUROSEMIDE 40 MG: 40 TABLET ORAL at 08:59

## 2022-07-26 NOTE — H&P
Toby 46  H&P- Iman Danielson 1950, 80 y o  female MRN: [id number]  Unit/Bed#: ED 28 Encounter: [id number]  Primary Care Provider: ORACIO Diallo   Date and time admitted to hospital: 7/25/2022  8:32 PM    * Panniculitis  Assessment & Plan  · Admit to Medicine  · Will place on cefepime 2 g IV q 12 hours and vancomycin 125 mg p o  Q 12 hours  · Consult wound care in a m  History of Clostridioides difficile infection  Assessment & Plan  Place on strict contact and hand hygiene precautions    Chronic atrial fibrillation ([facility] [address] )  Assessment & Plan  · Patient is rate controlled at this time  · Will continue pre-hospital Coumadin 2 mg alternating with 4 mg daily  · Obtain daily PT INR    Chronic diastolic congestive heart failure (HCC)  Assessment & Plan  Wt Readings from Last 3 Encounters:   07/25/22 (!) 201 kg (442 lb 7 4 oz)   06/14/22 (!) 211 kg (464 lb 8 2 oz)   05/08/22 (!) 212 kg (467 lb)     · None exacerbation at this time  · Will place on 1800 cc fluid restriction no added salt diet  · Obtain daily weights          Class 3 severe obesity due to excess calories with serious comorbidity and body mass index (BMI) greater than or equal to 70 in adult [facility])  Assessment & Plan  Encourage healthy weight loss and supportive care    Mild episode of recurrent major depressive disorder ([facility] [address] )  Assessment & Plan  Continue pre-hospital Cymbalta 20 mg p o  Daily    Other specified hypothyroidism  Assessment & Plan  New pre-hospital levothyroxine 125 mcg p o  Daily      VTE Prophylaxis: Warfarin (Coumadin)  Code Status:  Level 1  Discussion with family:  None present at bedside at time of exam    Anticipated Length of Stay:  Patient will be admitted on an Inpatient basis with an anticipated length of stay of  > 2 midnights  Justification for Hospital Stay:  Panniculitis requiring IV antibiotics    Total Time for Visit, including Counseling / Coordination of Care: 1 hour  Greater than 50% of this total time spent on direct patient counseling and coordination of care  Chief Complaint:   Abdominal pain x2 days    History of Present Illness:    Zoe Georges is a 80 y o  female who presents with abdominal pain x2 days  Patient with a history of C diff was recently on a course of Bactrim for left hip panniculitis who had stopped her Bactrim approximately 2 weeks ago after 5 days due to developing a rash  Patient does state that she has been compliant though with her p o  Vancomycin given her history of C diff  Patient complains of diffuse lower abdominal pain which she describes as crampy in nature  Patient denies any fever chills, no bright red blood per rectum or dark stools and states that her stools have become more formed over the past 2 days after completing her p o  Vancomycin  Patient additionally complaining of some nausea, fatigue, headache and cough  While in the ER patient was informed that her full-time caregiver tested positive for Demian and her sister is currently admitted to the medicine floor for COVID  Patient has received 2 doses of her COVID vaccine but no booster  Patient additionally complaining of some serosanguineous drainage which has become more purulent over the last week and has a history of recurrent panniculitis for which she sees wound care and has seen Infectious Disease in the past     Review of Systems:    Review of Systems   Constitutional: Positive for fatigue  Negative for chills and fever  HENT: Negative for congestion and sore throat  Respiratory: Positive for cough  Negative for shortness of breath and wheezing  Cardiovascular: Negative for chest pain and palpitations  Gastrointestinal: Positive for abdominal pain, diarrhea, nausea and vomiting  Genitourinary: Negative for dysuria, frequency, hematuria and urgency  Musculoskeletal: Negative for arthralgias and myalgias  Skin: Negative for wound  Neurological: Positive for headaches  Negative for dizziness, syncope and light-headedness  Psychiatric/Behavioral: Positive for hallucinations and suicidal ideas  All other systems reviewed and are negative  Past Medical and Surgical History:     Past Medical History:   Diagnosis Date    Atrial fibrillation ([place] [address] )     Bladder stone     C  difficile colitis     Cellulitis     CHF (congestive heart failure) ([place] [address] )     Depression with anxiety     Disease of thyroid gland     Diverticulitis     Enterocolitis     History of abnormal cervical Pap smear     Kidney stone     Lymphedema     Menopause     Age 53    Morbid obesity with BMI of 70 and over, adult ([place] [address] )     MRSA (methicillin resistant Staphylococcus aureus)     abdominal wound    Osteoarthritis     Phlebitis     left lower leg    Spondylolysis        Past Surgical History:   Procedure Laterality Date    APPENDECTOMY      CARPAL TUNNEL RELEASE      CHOLECYSTECTOMY      CYSTOSCOPY      stent    FOOT SURGERY  1982    bone spur    KNEE SURGERY      WISDOM TOOTH EXTRACTION         Meds/Allergies:    Prior to Admission medications    Medication Sig Start Date End Date Taking?  Authorizing Provider   acetaminophen (TYLENOL) 650 mg CR tablet Take 650 mg by mouth every 8 (eight) hours    Historical Provider, MD   allopurinol (ZYLOPRIM) 100 mg tablet Take 1 tablet (100 mg total) by mouth daily 9/30/19   Rodrigo Dietrich DO   DULoxetine (CYMBALTA) 20 mg capsule Take 1 capsule (20 mg total) by mouth daily 8/15/18   Rodrigo Dietrich DO   furosemide (LASIX) 40 mg tablet Take 40 mg by mouth daily Hold for SBP < 100    Historical Provider, MD   levothyroxine 125 mcg tablet Take 125 mcg by mouth daily    Historical Provider, MD   oxyCODONE (ROXICODONE) 5 immediate release tablet Take 1 tablet (5 mg total) by mouth every 6 (six) hours as needed for moderate pain or severe pain for up to 5 doses Max Daily Amount: 20 mg 6/14/22   ORACIO Callahan triamcinolone (KENALOG) 0 1 % cream Apply 0 1 application topically 2 (two) times a day 12/16/21   Historical Provider, MD   warfarin (COUMADIN) 2 mg tablet Take 1 tablet (2 mg total) by mouth every other day Monday, Wednesday, Friday, sunday 2/22/22   ORACIO Merida   warfarin (COUMADIN) 5 mg tablet Take 1 tablet (5 mg total) by mouth every other day Tuesday, Thursday, saturday  Patient taking differently: Take 4 mg by mouth every other day Tuesday, Thursday, saturday 2/22/22   ORACIO Merida   nystatin (MYCOSTATIN) powder Apply topically 2 (two) times a day 11/21/21 5/28/22  Ekaterina Pratt MD     all medications and allergies reviewed    Allergies:    Allergies   Allergen Reactions    Augmentin Es-600  [Amoxicillin-Pot Clavulanate]     Bactrim [Sulfamethoxazole-Trimethoprim] Throat Swelling    Penicillins Other (See Comments)     Tolerates cefepime (11/18/21)    Sulfa Antibiotics Hives    Vitamin C [Ascorbate - Food Allergy]     Latex Rash and Edema       Social History:     Marital Status: Single   Occupation:  Retired  Patient Pre-hospital Living Situation:  Resides at home with caregiver  Patient Pre-hospital Level of Mobility:  Bed ridden  Patient Pre-hospital Diet Restrictions:  Fluid restriction no added salt  Substance Use History:   Social History     Substance and Sexual Activity   Alcohol Use Not Currently     Social History     Tobacco Use   Smoking Status Former Smoker   Smokeless Tobacco Never Used   Tobacco Comment    5 packs/day until 5 (age 17-19) - As per [employer]      Social History     Substance and Sexual Activity   Drug Use Not Currently    Comment: As a teen - As per [employer]        Family History:  I have reviewed the patient's family history    Physical Exam:     Vitals:   Blood Pressure: 108/52 (07/26/22 0549)  Pulse: 72 (07/26/22 0549)  Temperature: 97 8 °F (36 6 °C) (07/25/22 2040)  Temp Source: Tympanic (07/25/22 2040)  Respirations: 17 (07/26/22 0549)  Weight - Scale: (!) 201 kg (442 lb 7 4 oz) (07/25/22 2038)  SpO2: 94 % (07/26/22 0549)    Physical Exam  Vitals and nursing note reviewed  Constitutional:       General: She is not in acute distress  Appearance: Normal appearance  She is obese  HENT:      Head: Normocephalic and atraumatic  Right Ear: Tympanic membrane normal       Left Ear: Tympanic membrane normal       Nose: Nose normal       Mouth/Throat:      Mouth: Mucous membranes are moist       Pharynx: Oropharynx is clear  No posterior oropharyngeal erythema  Eyes:      Extraocular Movements: Extraocular movements intact  Conjunctiva/sclera: Conjunctivae normal       Pupils: Pupils are equal, round, and reactive to light  Cardiovascular:      Rate and Rhythm: Normal rate and regular rhythm  Pulses: Normal pulses  Heart sounds: Normal heart sounds  No murmur heard  Pulmonary:      Effort: Pulmonary effort is normal  No respiratory distress  Breath sounds: Normal breath sounds  No rhonchi or rales  Abdominal:      General: Bowel sounds are normal       Palpations: Abdomen is soft  Tenderness: There is no abdominal tenderness  Musculoskeletal:      Cervical back: Normal range of motion and neck supple  No muscular tenderness  Right lower leg: Edema present  Left lower leg: Edema present  Skin:     General: Skin is warm and dry  Capillary Refill: Capillary refill takes less than 2 seconds  Findings: Erythema and lesion present  Comments: Erythema with purulent drainage left hip pannus   Neurological:      General: No focal deficit present  Mental Status: She is alert and oriented to person, place, and time  Additional Data:     Lab Results: I have personally reviewed pertinent reports        Results from last 7 days   Lab Units 07/26/22  0550   WBC Thousand/uL 9 77   HEMOGLOBIN g/dL 10 5*   HEMATOCRIT % 36 1   PLATELETS Thousands/uL 401*   NEUTROS PCT % 75   LYMPHS PCT % 15   MONOS PCT % 6   EOS PCT % 3     Results from last 7 days   Lab Units 07/25/22  2154   SODIUM mmol/L 132*   POTASSIUM mmol/L 4 9   CHLORIDE mmol/L 96   CO2 mmol/L 28   BUN mg/dL 28*   CREATININE mg/dL 1 20   ANION GAP mmol/L 8   CALCIUM mg/dL 8 8   ALBUMIN g/dL 3 0*   TOTAL BILIRUBIN mg/dL 0 74   ALK PHOS U/L 134*   ALT U/L 14   AST U/L 27   GLUCOSE RANDOM mg/dL 120     Results from last 7 days   Lab Units 07/26/22  0252   INR  2 28*                   Imaging: I have personally reviewed pertinent reports  No orders to display         EKG, Pathology, and Other Studies Reviewed on Admission:   · EKG: N/A    Epic / Care Everywhere Records Reviewed: Yes    ** Please Note: This note has been constructed using a voice recognition system   **

## 2022-07-26 NOTE — ED PROVIDER NOTES
History  Chief Complaint   Patient presents with    Abdominal Pain     Patient arrived via EMS, per EMS patient complains of abd pain, dizziness and drainage from left hip      81-year-old female arrives via EMS with diffuse lower abdominal pain  Patient states developed lower abdominal pain and cramping today, both right lower quadrant left lower quadrant but most intense in the suprapubic area  She denies any dysuria hematuria  She denies any flank pain  She has had no fever chills or rigors  Patient was recently on Bactrim for infection a cellulitis in her left hip area which is draining  She is concerned because this is discolored this time yellow  Patient developed diarrhea while on Bactrim and was empirically treated for C diff which she has a past medical history 4  Patient received 10 days of oral vancomycin, she states her stay stools the past 2 days have been normal solid  Patient had 1 episode of nausea vomiting after eating yesterday and felt nauseous throughout the day today but without vomiting  Her diarrhea is resolved, and the past 2 days as previously mentioned she has had normal BMs  Patient is status post cholecystectomy as well as appendectomy  Patient had her appendix removed at 27  and her gallbladder removed in 1987    Patient has a past medical history significant for congestive heart failure, atrial fibrillation, hypertension, recurrent cellulitis, C diff, and MRSA       patient received a phone call long the emergency room that her caregiver has COVID and will not be available until August 2nd to care for the patient  The patient is unable to care for herself  Prior to Admission Medications   Prescriptions Last Dose Informant Patient Reported? Taking?    DULoxetine (CYMBALTA) 20 mg capsule  Self No No   Sig: Take 1 capsule (20 mg total) by mouth daily   acetaminophen (TYLENOL) 650 mg CR tablet  Self Yes No   Sig: Take 650 mg by mouth every 8 (eight) hours allopurinol (ZYLOPRIM) 100 mg tablet  Self No No   Sig: Take 1 tablet (100 mg total) by mouth daily   furosemide (LASIX) 40 mg tablet   Yes No   Sig: Take 40 mg by mouth daily Hold for SBP < 100   levothyroxine 125 mcg tablet  Self Yes No   Sig: Take 125 mcg by mouth daily   oxyCODONE (ROXICODONE) 5 immediate release tablet   No No   Sig: Take 1 tablet (5 mg total) by mouth every 6 (six) hours as needed for moderate pain or severe pain for up to 5 doses Max Daily Amount: 20 mg   triamcinolone (KENALOG) 0 1 % cream  Self Yes No   Sig: Apply 0 1 application topically 2 (two) times a day   warfarin (COUMADIN) 2 mg tablet   No No   Sig: Take 1 tablet (2 mg total) by mouth every other day Monday, Wednesday, Friday, sunday   warfarin (COUMADIN) 5 mg tablet   No No   Sig: Take 1 tablet (5 mg total) by mouth every other day Tuesday, Thursday, saturday   Patient taking differently: Take 4 mg by mouth every other day Tuesday, Thursday, saturday      Facility-Administered Medications: None       Past Medical History:   Diagnosis Date    Atrial fibrillation ([facility] )     Bladder stone     C  difficile colitis     Cellulitis     CHF (congestive heart failure) ([facility] )     Depression with anxiety     Disease of thyroid gland     Diverticulitis     Enterocolitis     History of abnormal cervical Pap smear     Kidney stone     Lymphedema     Menopause     Age 53    Morbid obesity with BMI of 70 and over, adult ([facility] )     MRSA (methicillin resistant Staphylococcus aureus)     abdominal wound    Osteoarthritis     Phlebitis     left lower leg    Spondylolysis        Past Surgical History:   Procedure Laterality Date    APPENDECTOMY      CARPAL TUNNEL RELEASE      CHOLECYSTECTOMY      CYSTOSCOPY      stent    FOOT SURGERY  1982    bone spur    KNEE SURGERY      WISDOM TOOTH EXTRACTION         Family History   Problem Relation Age of Onset    Heart failure Mother     Heart disease Mother     Lymphoma Mother    Radha Lopezliff Kidney disease Father     Heart disease Father     Heart failure Father     Diabetes type II Father     Diabetes type II Sister     Diabetes type II Brother     Uterine cancer Paternal Aunt     Breast cancer Neg Hx     Colon cancer Neg Hx     Ovarian cancer Neg Hx      I have reviewed and agree with the history as documented  E-Cigarette/Vaping    E-Cigarette Use Never User      E-Cigarette/Vaping Substances    Nicotine No     THC No     CBD No     Flavoring No     Other No     Unknown No      Social History     Tobacco Use    Smoking status: Former Smoker    Smokeless tobacco: Never Used    Tobacco comment: 5 packs/day until 5 (age 17-19) - As per [employer]    Vaping Use    Vaping Use: Never used   Substance Use Topics    Alcohol use: Not Currently    Drug use: Not Currently     Comment: As a teen - As per [employer]        Review of Systems   Constitutional: Negative  Respiratory: Negative  Cardiovascular: Positive for leg swelling  Genitourinary: Negative  Skin: Positive for wound  Neurological: Negative  Hematological: Negative  Psychiatric/Behavioral: Negative  Physical Exam  Physical Exam  Vitals and nursing note reviewed  Constitutional:       General: She is not in acute distress  Appearance: She is obese  She is not ill-appearing, toxic-appearing or diaphoretic  HENT:      Head: Normocephalic and atraumatic  Eyes:      General: No scleral icterus  Extraocular Movements: Extraocular movements intact  Cardiovascular:      Rate and Rhythm: Normal rate and regular rhythm  Pulmonary:      Effort: Pulmonary effort is normal       Breath sounds: Normal breath sounds  Abdominal:      General: Bowel sounds are normal  There is no distension or abdominal bruit  Palpations: Abdomen is soft  Tenderness: There is generalized abdominal tenderness and tenderness in the right lower quadrant, suprapubic area and left lower quadrant   There is no right CVA tenderness, left CVA tenderness, guarding or rebound  Negative signs include Marie's sign, McBurney's sign and psoas sign  Hernia: No hernia is present  Skin:     General: Skin is warm and dry  Neurological:      General: No focal deficit present  Mental Status: She is alert and oriented to person, place, and time     Psychiatric:         Mood and Affect: Mood normal          Behavior: Behavior normal          Vital Signs  ED Triage Vitals   Temperature Pulse Respirations Blood Pressure SpO2   07/25/22 2040 07/25/22 2040 07/25/22 2038 07/25/22 2040 07/25/22 2040   97 8 °F (36 6 °C) 82 18 115/59 95 %      Temp Source Heart Rate Source Patient Position - Orthostatic VS BP Location FiO2 (%)   07/25/22 2040 07/25/22 2040 07/25/22 2040 07/25/22 2040 --   Tympanic Monitor Lying Left arm       Pain Score       --                  Vitals:    07/25/22 2040   BP: 115/59   Pulse: 82   Patient Position - Orthostatic VS: Lying         Visual Acuity      ED Medications  Medications - No data to display    Diagnostic Studies  Results Reviewed     Procedure Component Value Units Date/Time    Lipase [[id number]]  (Normal) Collected: 07/25/22 2154    Lab Status: Final result Specimen: Blood from Arm, Right Updated: 07/25/22 2225     Lipase 40 u/L     CMP [[id number]]  (Abnormal) Collected: 07/25/22 2154    Lab Status: Final result Specimen: Blood from Arm, Right Updated: 07/25/22 2225     Sodium 132 mmol/L      Potassium 4 9 mmol/L      Chloride 96 mmol/L      CO2 28 mmol/L      ANION GAP 8 mmol/L      BUN 28 mg/dL      Creatinine 1 20 mg/dL      Glucose 120 mg/dL      Calcium 8 8 mg/dL      Corrected Calcium 9 6 mg/dL      AST 27 U/L      ALT 14 U/L      Alkaline Phosphatase 134 U/L      Total Protein 7 5 g/dL      Albumin 3 0 g/dL      Total Bilirubin 0 74 mg/dL      eGFR 45 ml/min/1 73sq m     Narrative:      [facility] guidelines for Chronic Kidney Disease (CKD):     Stage 1 with normal or high GFR (GFR > 90 mL/min/1 73 square meters)    Stage 2 Mild CKD (GFR = 60-89 mL/min/1 73 square meters)    Stage 3A Moderate CKD (GFR = 45-59 mL/min/1 73 square meters)    Stage 3B Moderate CKD (GFR = 30-44 mL/min/1 73 square meters)    Stage 4 Severe CKD (GFR = 15-29 mL/min/1 73 square meters)    Stage 5 End Stage CKD (GFR <15 mL/min/1 73 square meters)  Note: GFR calculation is accurate only with a steady state creatinine    CBC and differential [[id number]]  (Abnormal) Collected: 07/25/22 2154    Lab Status: Final result Specimen: Blood from Arm, Right Updated: 07/25/22 2200     WBC 10 88 Thousand/uL      RBC 4 54 Million/uL      Hemoglobin 11 2 g/dL      Hematocrit 38 7 %      MCV 85 fL      MCH 24 7 pg      MCHC 28 9 g/dL      RDW 17 9 %      MPV 8 4 fL      Platelets 056 Thousands/uL      nRBC 0 /100 WBCs      Neutrophils Relative 79 %      Immat GRANS % 1 %      Lymphocytes Relative 10 %      Monocytes Relative 6 %      Eosinophils Relative 3 %      Basophils Relative 1 %      Neutrophils Absolute 8 75 Thousands/µL      Immature Grans Absolute 0 05 Thousand/uL      Lymphocytes Absolute 1 12 Thousands/µL      Monocytes Absolute 0 60 Thousand/µL      Eosinophils Absolute 0 31 Thousand/µL      Basophils Absolute 0 05 Thousands/µL     UA w Reflex to Microscopic w Reflex to Culture [[id number]]     Lab Status: No result Specimen: Urine                  No orders to display              Procedures  ECG 12 Lead Documentation Only    Date/Time: 7/25/2022 11:02 PM  Performed by: Niranjan Catalan MD  Authorized by: Niranjan Catalan MD     Indications / Diagnosis:  Atrial fibrillation  ECG reviewed by me, the ED Provider: yes    Patient location:  ED  Previous ECG:     Previous ECG:  Compared to current    Similarity:  No change    Comparison to cardiac monitor: Yes    Interpretation:     Interpretation: abnormal    Rate:     ECG rate:  70-60    ECG rate assessment: normal    Rhythm:     Rhythm: atrial fibrillation    Ectopy:     Ectopy: none    QRS:     QRS axis:  Normal  Conduction:     Conduction: normal    ST segments:     ST segments:  Normal  T waves:     T waves: non-specific and flattening               ED Course                                             MDM    Disposition  Final diagnoses:   None     ED Disposition     None      Follow-up Information    None         Patient's Medications   Discharge Prescriptions    No medications on file       No discharge procedures on file      PDMP Review       Value Time User    PDMP Reviewed  Yes 5/27/2022 11:10 PM Anjelica Rangel PA-C          ED Provider  Electronically Signed by           Verónica Meade MD  07/25/22 [id number]       Verónica Meade MD  08/01/22 [id number]

## 2022-07-26 NOTE — ED NOTES
Patient reports she takes:  Warfarin 2 mg on Sunday, Monday, Wednesday, and Friday  Warfarin 4 mg on Tuesday, Thursday, Saturday    [facility] of this        Blanca Goel RN  07/26/22 [id number]

## 2022-07-26 NOTE — ASSESSMENT & PLAN NOTE
· Patient is rate controlled at this time  · Will continue pre-hospital Coumadin 2 mg alternating with 4 mg daily  · Obtain daily PT INR

## 2022-07-26 NOTE — ASSESSMENT & PLAN NOTE
· Admit to Medicine  · Will place on cefepime 2 g IV q 12 hours and vancomycin 125 mg p o  Q 12 hours  · Consult wound care in a m

## 2022-07-26 NOTE — OCCUPATIONAL THERAPY NOTE
Occupational Therapy Evaluation      Vel Farfan    7/26/2022    Principal Problem:    Panniculitis  Active Problems:    Other specified hypothyroidism    Mild episode of recurrent major depressive disorder (HCC)    Class 3 severe obesity due to excess calories with serious comorbidity and body mass index (BMI) greater than or equal to 70 in adult [facility])    Chronic diastolic congestive heart failure (HCC)    History of Clostridioides difficile infection    Chronic atrial fibrillation (HCC)      Past Medical History:   Diagnosis Date    Atrial fibrillation (HCC)     Bladder stone     C  difficile colitis     Cellulitis     CHF (congestive heart failure) (HCC)     Depression with anxiety     Disease of thyroid gland     Diverticulitis     Enterocolitis     History of abnormal cervical Pap smear     Kidney stone     Lymphedema     Menopause     Age 53    Morbid obesity with BMI of 70 and over, adult ([facility] [address] )     MRSA (methicillin resistant Staphylococcus aureus)     abdominal wound    Osteoarthritis     Phlebitis     left lower leg    Spondylolysis        Past Surgical History:   Procedure Laterality Date    APPENDECTOMY      CARPAL TUNNEL RELEASE      CHOLECYSTECTOMY      CYSTOSCOPY      stent    FOOT SURGERY  1982    bone spur    KNEE SURGERY      WISDOM TOOTH EXTRACTION          07/26/22 0849   OT Last Visit   OT Visit Date 07/26/22   Note Type   Note type Evaluation   Restrictions/Precautions   Weight Bearing Precautions Per Order No   Other Precautions Contact/isolation; Bed Alarm; Fall Risk;Pain;Multiple lines  (Contact/isolation; Bed Alarm; Fall Risk;Pain;Multiple lines;)   Pain Assessment   Pain Assessment Tool 0-10   Pain Score 7   Pain Location/Orientation Location: Generalized   Pain Onset/Description Onset: Ongoing;Frequency: Constant/Continuous   Patient's Stated Pain Goal No pain   Home Living   Type of [address] [place] [address] One level;Performs ADLs on one level; Able to live on main level with bedroom/bathroom; Ramped entrance   Bathroom Shower/Tub   (pt sponge bathes/bed bound)   Bathroom Accessibility Not accessible   [facility] bed  (pt reports being bedbound PTA)   Additional Comments pt reports being bed bound ~ 3 years   Prior Function   Level of [place] Needs assistance with ADLs and functional mobility; Needs assistance with IADLs   Lives With Alone   Receives Help From Personal care attendant; Family  (CGs 8am-8pm 7 days/wk; sister and nephew visit weekly)   ADL Assistance Needs assistance   IADLs Needs assistance   Falls in the last 6 months 0   Comments Pt reports at baseline she had assistance from her caregivers who came 12 hours/day  Caregivers would assist with sponge bathing, dressing, toileting using bed pan, and assisting her in exercises including UE and LE TE   ADL   UB Bathing Assistance 4  Minimal Assistance   LB Bathing Assistance 2  Maximal Assistance   UB Dressing Assistance 3  Moderate Assistance   LB Dressing Assistance 1  Total Assistance   Bed Mobility   Rolling R 2  Maximal assistance   Additional items Assist x 1;Bedrails; Increased time required;LE management   Rolling L Unable to assess  (pt reports she does not roll towards L at baseline d/t h/o R acetabular fx)   Supine to Sit Unable to assess  (Did not test further mobility due to safety concerns as patient does not complete at baseline)   Transfers   Sit to Stand Unable to assess   Balance   Static Sitting Good  (in supported long sit in bed)   Dynamic Sitting   (DNT)   Activity Tolerance   Activity Tolerance Patient limited by fatigue;Patient limited by pain   RUE Assessment   RUE Assessment X  (shoulder AROM ~90 degrees)   RUE Strength   RUE Overall Strength Deficits  (3/5)   LUE Assessment   LUE Assessment X   LUE Strength   LUE Overall Strength Deficits  (3/5)   Hand Function   Gross Motor Coordination Functional   Fine Motor Coordination Functional   Vision-Basic Assessment   Current Vision Wears glasses all the time   Cognition   Overall Cognitive Status [facility]   Arousal/Participation Alert; Cooperative   Attention Attends with cues to redirect   Orientation Level Oriented X4   Memory Within functional limits   Following Commands Follows one step commands without difficulty   Assessment   Assessment Pt is a 80 y o  female seen for OT evaluation s/p admit to [place] [id number] on 7/25/2022 w/ Panniculitis  Comorbidities affecting pt's functional performance at time of assessment include: A-fib, CHF, Depression with anxiety, Morbid obesity, OA  Personal factors affecting pt at time of IE include:limited home support at the moment due to caregivers are out sick  Prior to admission, pt required A with ADLs and is currently at baseline function  From OT standpoint, recommendation at time of d/c would be no rehab needs  Goals   Patient Goals "to lose more weight"   Plan   Goal Expiration Date 07/26/22   OT Treatment Day 0   OT Frequency Eval only   Recommendation   OT Discharge Recommendation No rehabilitation needs   Additional Comments  Pt seen as a co-eval with PT due to the patient's co-morbidities, clinically unstable presentation, and present impairments which are a regression from the patient's baseline  Additional Comments 2 The patient's raw score on the AM-PAC Daily Activity inpatient short form is 14, standardized score is 33 39, less than 39 4  Patients at this level are likely to benefit from discharge to post-acute rehabilitation services, however, 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 and it completing ADLs at baseline function  Please refer to the recommendation of the Occupational Therapist for safe discharge planning     AM-PAC Daily Activity Inpatient   Lower Body Dressing 1   Bathing 2   Toileting 1   Upper Body Dressing 2   Grooming 4   Eating 4   Daily Activity Raw Score 14   Daily Activity Standardized Score (Calc for Raw Score >=11) 33 39   AM-PAC Applied Cognition Inpatient   Following a Speech/Presentation 4   Understanding Ordinary Conversation 4   Taking Medications 4   Remembering Where Things Are Placed or Put Away 4   Remembering List of 4-5 Errands 4   Taking Care of Complicated Tasks 4   Applied Cognition Raw Score 24   Applied Cognition Standardized Score 62 21     Raffi Ramos MS, OTR/L

## 2022-07-26 NOTE — PHYSICAL THERAPY NOTE
Physical Therapy Evaluation   Time in: 0833  Time out: 0849  Total evaluation time: 16 minutes    Patient's Name: Vel Farfan    Admitting Diagnosis  Abdominal pain [R10 9]    Problem List  Patient Active Problem List   Diagnosis    Other specified hypothyroidism    Mild episode of recurrent major depressive disorder (HCC)    Idiopathic chronic 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    Primary osteoarthritis involving multiple joints    Class 3 severe obesity due to excess calories with serious comorbidity and body mass index (BMI) greater than or equal to 70 in adult [facility])    Chronic diastolic congestive heart failure (HCC)    Panniculitis    Gastroesophageal reflux disease without esophagitis    Lymphedema of extremity    Adjustment disorder    Ambulatory dysfunction    Anemia    Carpal tunnel syndrome    Depression with anxiety    Gout    Hx MRSA infection    Hyperlipidemia    Irritable bowel syndrome    Left atrial enlargement    Left ovarian cyst    Left ventricular hypertrophy    Osteoarthritis    Symptomatic menopausal or female climacteric states    History of Clostridioides difficile infection    Chronic atrial fibrillation (HCC)    BMI 70 and over, adult ([facility] [address] )    Functional gait abnormality    Impaired mobility    Osteoarthritis of glenohumeral joint       Past Medical History  Past Medical History:   Diagnosis Date    Atrial fibrillation ([facility] [address] )     Bladder stone     C  difficile colitis     Cellulitis     CHF (congestive heart failure) ([facility] [address] )     Depression with anxiety     Disease of thyroid gland     Diverticulitis     Enterocolitis     History of abnormal cervical Pap smear     Kidney stone     Lymphedema     Menopause     Age 53    Morbid obesity with BMI of 70 and over, adult ([facility] [address] )     MRSA (methicillin resistant Staphylococcus aureus)     abdominal wound    Osteoarthritis     Phlebitis     left lower leg    Spondylolysis        Past Surgical History  Past Surgical History:   Procedure Laterality Date    APPENDECTOMY      CARPAL TUNNEL RELEASE      CHOLECYSTECTOMY      CYSTOSCOPY      stent    FOOT SURGERY  1982    bone spur    KNEE SURGERY      WISDOM TOOTH EXTRACTION         PT performed at least 2 patient identifiers during session: Name and wristband  07/26/22 0834   PT Last Visit   PT Visit Date 07/26/22   Note Type   Note type Evaluation   Pain Assessment   Pain Assessment Tool 0-10   Pain Score 7   Pain Location/Orientation Location: Generalized   Pain Onset/Description Onset: Ongoing;Frequency: Constant/Continuous   Patient's Stated Pain Goal No pain   Restrictions/Precautions   Weight Bearing Precautions Per Order No   Other Precautions Contact/isolation; Bed Alarm; Fall Risk;Pain;Multiple lines  (morbid obesity)   Home Living   Type of [address] [place] [address] One level;Performs ADLs on one level; Able to live on main level with bedroom/bathroom; Ramped entrance   Bathroom Shower/Tub   (pt sponge bathes/bed bound)   Bathroom Accessibility Not accessible   [facility] bed  (pt reports being bedbound)   Additional Comments pt reports being bed bound ~ 3 years   Prior Function   Level of [place] Needs assistance with ADLs and functional mobility; Needs assistance with IADLs   Lives With Alone   Receives Help From Personal care attendant; Family  (CGs 8am-8pm 7 days/wk; sister and nephew visit weekly)   ADL Assistance Needs assistance   IADLs Needs assistance   Falls in the last 6 months 0   Comments Pt reports at baseline she had assistance from her caregivers who came 12 hours/day   Caregivers would assist with sponge bathing, dressing, toileting using bed pan, and assisting her in exercises including UE and LE TE   General   Family/Caregiver Present No   Cognition   Arousal/Participation Alert   Orientation Level Oriented X4   Memory Within functional limits   Following Commands Follows one step commands without difficulty   Comments pt agreeable to PT session   Subjective Subjective "I haven't been out of bed in years"   RLE Assessment   RLE Assessment X   Strength RLE   RLE Overall Strength 3-/5  (Overall AROM limited by soft tissue in supine position; however, pt able to use BLEs to assist in rolling with use of bed rails)   LLE Assessment   LLE Assessment X   Strength LLE   LLE Overall Strength 3-/5  (Overall AROM limited by soft tissue in supine position; however, pt able to use BLEs to assist in rolling with use of bed rails)   Vision-Basic Assessment   Current Vision Wears glasses all the time   Coordination   Sensation X  (h/o B carpal tunnel surgery)   Bed Mobility   Rolling R 2  Maximal assistance   Additional items Assist x 1;Bedrails; Increased time required;LE management   Rolling L Unable to assess  (pt reports she does not roll towards L at baseline d/t h/o R acetabular fx)   Supine to Sit Unable to assess  (Did not test further mobility due to safety concerns as patient does not complete at baseline)   Transfers   Sit to Stand Unable to assess   Balance   Static Sitting Good  (in supported long sit in bed)   Dynamic Sitting   (DNT)   Endurance Deficit   Endurance Deficit Yes   Activity Tolerance   Activity Tolerance Patient limited by fatigue;Patient limited by pain   Medical Staff Made Aware coordination of session with  [address] Yes, RN made aware of outcomes/recs   Assessment   Prognosis Fair   Problem List Obesity;Pain;Decreased mobility; Decreased endurance   Assessment Pt is 80 y o  female seen for high-complexity PT evaluation s/p admit to [facility] on 7/25/2022 w/ Panniculitis  PT consulted to assess pt's functional mobility and d/c needs  Order placed for PT eval and tx, w/ up w/ A order  Comorbidities affecting pt's physical performance at time of assessment include: severe obesity c BMI 80 93 kg/m2, hypothyroidism, major depressive disorder, AFib, chronic diastolic CHF   PTA, pt was living in a two story home where she stays on the main level  Patient lives alone; however, has caregivers 12 hours/day that assist with sponge bathing, use of bed pain, and repositioning in bed  Patient reports she does lower and upper extremity exercises at home daily  Patient states she does not get out of bed at home and has not for three years  Personal factors affecting pt at time of IE include: inability to ambulate household distances and unable to perform dynamic tasks in community  Please find objective findings from PT assessment regarding body systems outlined above  At time of evaluation, patient performed rolling modA of 1 with use of bed rail and patient able to verbalize instructions to PT on how she rolls at home  Patient with intact sensation to light touch to BLEs with ROM at BLEs limited by soft tissue in supine position  The following objective measures performed on IE: AM-PAC 6-Clicks: 5/55, low function basic mobility raw score:15  Pt's clinical presentation is currently unstable/unpredictable seen in pt's presentation of age, comorbidities, review of multiple systems during evaluation  From PT/mobility standpoint, pt appears to be functioning close to or at mobility baseline, therefore no further immediate skilled PT needs are warranted at this time  Pt currently performing all phases of functional mobility at baseline and able to make needs known; patient states "I'm still able to do all my exercises and move around like I do at home " Recommend pt return to previous living environment once medically cleared with previous caregiver support and assist at home  Will sign off, D/C PT  Please reconsult if further immediate skilled PT needs are warranted     Barriers to Discharge   (pt has 12 hr/day CGs)   Goals   Patient Goals "to lose more weight"   PT Treatment Day 0   Plan   Treatment/Interventions Spoke to nursing   PT Frequency   (D/C PT, evaluation only)   Recommendation   PT Discharge Recommendation No rehabilitation needs Equipment Recommended (S)  Other (Comment)  (pt expresses need for new hospital bed mattress for at home)   [place] 8 in Bed Without Bedrails 2   Lying on Back to Sitting on Edge of Flat Bed 1   Moving Bed to Chair 1   Standing Up From Chair 1   Walk in Room 1   Climb 3-5 Stairs 1   Basic Mobility Inpatient Raw Score 7   Turning Head Towards Sound 4   Follow Simple Instructions 4   Low Function Basic Mobility Raw Score 15   Low Function Basic Mobility Standardized Score 23 9   Highest Level Of Mobility   -HLM Goal 2: Bed activities/Dependent transfer   -HL Achieved 2: Bed activities/Dependent transfer   End of Consult   Patient Position at End of Consult Supine;Bed/Chair alarm activated; All needs within reach  [facility] elevated minimally per pt request)       Victorina Valdez, PT, DPT

## 2022-07-26 NOTE — ASSESSMENT & PLAN NOTE
Wt Readings from Last 3 Encounters:   07/25/22 (!) 201 kg (442 lb 7 4 oz)   06/14/22 (!) 211 kg (464 lb 8 2 oz)   05/08/22 (!) 212 kg (467 lb)     · None exacerbation at this time  · Will place on 1800 cc fluid restriction no added salt diet  · Obtain daily weights

## 2022-07-27 ENCOUNTER — APPOINTMENT (INPATIENT)
Dept: ULTRASOUND IMAGING | Facility: HOSPITAL | Age: 72
DRG: 607 | End: 2022-07-27
Payer: MEDICARE

## 2022-07-27 LAB
ALBUMIN SERPL BCP-MCNC: 2.7 G/DL (ref 3.5–5)
ALP SERPL-CCNC: 116 U/L (ref 34–104)
ALT SERPL W P-5'-P-CCNC: 19 U/L (ref 7–52)
ANION GAP SERPL CALCULATED.3IONS-SCNC: 9 MMOL/L (ref 4–13)
AST SERPL W P-5'-P-CCNC: 22 U/L (ref 13–39)
BASOPHILS # BLD AUTO: 0.07 THOUSANDS/ΜL (ref 0–0.1)
BASOPHILS NFR BLD AUTO: 1 % (ref 0–1)
BILIRUB SERPL-MCNC: 0.87 MG/DL (ref 0.2–1)
BUN SERPL-MCNC: 28 MG/DL (ref 5–25)
CALCIUM ALBUM COR SERPL-MCNC: 9.5 MG/DL (ref 8.3–10.1)
CALCIUM SERPL-MCNC: 8.5 MG/DL (ref 8.4–10.2)
CHLORIDE SERPL-SCNC: 98 MMOL/L (ref 96–108)
CO2 SERPL-SCNC: 30 MMOL/L (ref 21–32)
CREAT SERPL-MCNC: 1.29 MG/DL (ref 0.6–1.3)
EOSINOPHIL # BLD AUTO: 0.33 THOUSAND/ΜL (ref 0–0.61)
EOSINOPHIL NFR BLD AUTO: 4 % (ref 0–6)
ERYTHROCYTE [DISTWIDTH] IN BLOOD BY AUTOMATED COUNT: 17.9 % (ref 11.6–15.1)
GFR SERPL CREATININE-BSD FRML MDRD: 42 ML/MIN/1.73SQ M
GLUCOSE SERPL-MCNC: 106 MG/DL (ref 65–140)
HCT VFR BLD AUTO: 35.1 % (ref 34.8–46.1)
HGB BLD-MCNC: 10.3 G/DL (ref 11.5–15.4)
IMM GRANULOCYTES # BLD AUTO: 0.04 THOUSAND/UL (ref 0–0.2)
IMM GRANULOCYTES NFR BLD AUTO: 1 % (ref 0–2)
INR PPP: 2.03 (ref 0.84–1.19)
LYMPHOCYTES # BLD AUTO: 1.04 THOUSANDS/ΜL (ref 0.6–4.47)
LYMPHOCYTES NFR BLD AUTO: 12 % (ref 14–44)
MCH RBC QN AUTO: 24.5 PG (ref 26.8–34.3)
MCHC RBC AUTO-ENTMCNC: 29.3 G/DL (ref 31.4–37.4)
MCV RBC AUTO: 84 FL (ref 82–98)
MONOCYTES # BLD AUTO: 0.63 THOUSAND/ΜL (ref 0.17–1.22)
MONOCYTES NFR BLD AUTO: 7 % (ref 4–12)
NEUTROPHILS # BLD AUTO: 6.71 THOUSANDS/ΜL (ref 1.85–7.62)
NEUTS SEG NFR BLD AUTO: 75 % (ref 43–75)
NRBC BLD AUTO-RTO: 0 /100 WBCS
PLATELET # BLD AUTO: 400 THOUSANDS/UL (ref 149–390)
PMV BLD AUTO: 8.4 FL (ref 8.9–12.7)
POTASSIUM SERPL-SCNC: 4.1 MMOL/L (ref 3.5–5.3)
PROT SERPL-MCNC: 7 G/DL (ref 6.4–8.4)
PROTHROMBIN TIME: 22.9 SECONDS (ref 11.6–14.5)
RBC # BLD AUTO: 4.2 MILLION/UL (ref 3.81–5.12)
SODIUM SERPL-SCNC: 137 MMOL/L (ref 135–147)
WBC # BLD AUTO: 8.82 THOUSAND/UL (ref 4.31–10.16)

## 2022-07-27 PROCEDURE — 80053 COMPREHEN METABOLIC PANEL: CPT | Performed by: HOSPITALIST

## 2022-07-27 PROCEDURE — 85610 PROTHROMBIN TIME: CPT | Performed by: HOSPITALIST

## 2022-07-27 PROCEDURE — 85025 COMPLETE CBC W/AUTO DIFF WBC: CPT | Performed by: HOSPITALIST

## 2022-07-27 PROCEDURE — 99232 SBSQ HOSP IP/OBS MODERATE 35: CPT | Performed by: HOSPITALIST

## 2022-07-27 PROCEDURE — G0427 INPT/ED TELECONSULT70: HCPCS | Performed by: INTERNAL MEDICINE

## 2022-07-27 PROCEDURE — 76705 ECHO EXAM OF ABDOMEN: CPT

## 2022-07-27 RX ORDER — TRIAMCINOLONE ACETONIDE 1 MG/G
CREAM TOPICAL 2 TIMES DAILY
Status: DISCONTINUED | OUTPATIENT
Start: 2022-07-27 | End: 2022-08-05 | Stop reason: HOSPADM

## 2022-07-27 RX ORDER — CEFEPIME HYDROCHLORIDE 2 G/50ML
2000 INJECTION, SOLUTION INTRAVENOUS EVERY 8 HOURS
Status: DISCONTINUED | OUTPATIENT
Start: 2022-07-27 | End: 2022-07-31

## 2022-07-27 RX ADMIN — WARFARIN SODIUM 2 MG: 2 TABLET ORAL at 18:40

## 2022-07-27 RX ADMIN — CEFEPIME HYDROCHLORIDE 2000 MG: 2 INJECTION, SOLUTION INTRAVENOUS at 23:59

## 2022-07-27 RX ADMIN — DULOXETINE 20 MG: 20 CAPSULE, DELAYED RELEASE ORAL at 08:17

## 2022-07-27 RX ADMIN — FUROSEMIDE 40 MG: 40 TABLET ORAL at 08:16

## 2022-07-27 RX ADMIN — LEVOTHYROXINE SODIUM 125 MCG: 25 TABLET ORAL at 05:24

## 2022-07-27 RX ADMIN — Medication 125 MG: at 20:31

## 2022-07-27 RX ADMIN — TRIAMCINOLONE ACETONIDE: 1 CREAM TOPICAL at 18:41

## 2022-07-27 RX ADMIN — ALLOPURINOL 100 MG: 100 TABLET ORAL at 08:17

## 2022-07-27 RX ADMIN — Medication 125 MG: at 08:17

## 2022-07-27 RX ADMIN — CEFEPIME HYDROCHLORIDE 2000 MG: 2 INJECTION, SOLUTION INTRAVENOUS at 13:02

## 2022-07-27 RX ADMIN — CEFEPIME HYDROCHLORIDE 2000 MG: 2 INJECTION, SOLUTION INTRAVENOUS at 00:01

## 2022-07-27 NOTE — ASSESSMENT & PLAN NOTE
Wt Readings from Last 3 Encounters:   07/25/22 (!) 201 kg (442 lb 7 4 oz)   06/14/22 (!) 211 kg (464 lb 8 2 oz)   05/08/22 (!) 212 kg (467 lb)     · Currently without exacerbation  · Continue Coumadin, continue Lasix

## 2022-07-27 NOTE — PLAN OF CARE
Problem: MOBILITY - ADULT  Goal: Maintain or return to baseline ADL function  Description: INTERVENTIONS:  -  Assess patient's ability to carry out ADLs; assess patient's baseline for ADL function and identify physical deficits which impact ability to perform ADLs (bathing, care of mouth/teeth, toileting, grooming, dressing, etc )  - Assess/evaluate cause of self-care deficits   - Assess range of motion  - Assess patient's mobility; develop plan if impaired  - Assess patient's need for assistive devices and provide as appropriate  - Encourage maximum independence but intervene and supervise when necessary  - Involve family in performance of ADLs  - Assess for home care needs following discharge   - Consider OT consult to assist with ADL evaluation and planning for discharge  - Provide patient education as appropriate  Outcome: Progressing  Goal: Maintains/Returns to pre admission functional level  Description: INTERVENTIONS:  - Perform BMAT or MOVE assessment daily    - Set and communicate daily mobility goal to care team and patient/family/caregiver  - Collaborate with rehabilitation services on mobility goals if consulted  - Perform Range of Motion 3 times a day  - Reposition patient every 2 hours    - Record patient progress and toleration of activity level   Outcome: Progressing

## 2022-07-27 NOTE — NURSING NOTE
Patient on bedpan for extended period of time  Patient refusing to get off at this time  Patient educated on need to only stay on for shirt periods of time  Patient verbalized understanding, continues to refuse to get off

## 2022-07-27 NOTE — PROGRESS NOTES
[facility] [address]  Progress Note - Liyah Bello 1950, 80 y o  female MRN: [id number]  Unit/Bed#: -01 Encounter: [id number]  Primary Care Provider: Shayan Allen MD   Date and time admitted to hospital: 7/25/2022  8:32 PM    * Panniculitis  Assessment & Plan  · Appreciate ID input  · Increased cefepime dosing from 2 g IV q 12 hours to 2 g IV Q 8 hours  · Will check on ultrasound  · Consult general surgery  · Continue wound care  · If workup is negative, anticipate transition to p o  Levaquin in discharge planning in 24-48 hours  · Repeat blood work in the a m  History of Clostridioides difficile infection  Assessment & Plan  · Continue vancomycin 125 mg p o  q 12 hours    Chronic diastolic congestive heart failure (HCC)  Assessment & Plan  Wt Readings from Last 3 Encounters:   07/25/22 (!) 201 kg (442 lb 7 4 oz)   06/14/22 (!) 211 kg (464 lb 8 2 oz)   05/08/22 (!) 212 kg (467 lb)     · Currently without exacerbation  · Continue Coumadin, continue Lasix        Chronic atrial fibrillation ([facility] [address] )  Assessment & Plan  · Patient is rate controlled at this time  · Will continue pre-hospital Coumadin 2 mg alternating with 4 mg daily  · INR is therapeutic    Other specified hypothyroidism  Assessment & Plan  · Continue levothyroxine    Mild episode of recurrent major depressive disorder (HCC)  Assessment & Plan  · Continue Cymbalta    Class 3 severe obesity due to excess calories with serious comorbidity and body mass index (BMI) greater than or equal to 70 in adult [facility])  Assessment & Plan  · Actual BMI 80 93  · Dietary, weight loss, and lifestyle modification counseling was provided        VTE Prophylaxis:  Warfarin (Coumadin)    Patient Centered Rounds: I have performed bedside rounds with nursing staff today      Discussions with Specialists or Other Care Team Provider:  Case management, nursing, General surgery, Infectious Disease  Education and Discussions with Family / Patient:  Patient was brought up to par with the plan of care for today, she did not ask and or want me to call anyone to update them    Current Length of Stay: 1 day(s)    Current Patient Status: Inpatient   Certification Statement: The patient will continue to require additional inpatient hospital stay due to Need for continued IV antibiotics    Discharge Plan:  Hopeful discharge planning in 24-48 hours, case management is working on a safe discharge plan for the patient to return home    Code Status: Level 1 - Full Code    Subjective:   Patient seen and examined, resting in bed, appears comfortable, denies pain or discomfort, is frustrated with being in the hospital again and once her cellulitis to go way    Objective:     Vitals:   Temp (24hrs), Av °F (36 7 °C), Min:97 8 °F (36 6 °C), Max:98 1 °F (36 7 °C)    Temp:  [97 8 °F (36 6 °C)-98 1 °F (36 7 °C)] 98 1 °F (36 7 °C)  HR:  [62-70] 62  Resp:  [17-21] 19  BP: (102-110)/(56-66) 106/56  SpO2:  [94 %-95 %] 95 %  Body mass index is 80 93 kg/m²  Input and Output Summary (last 24 hours): Intake/Output Summary (Last 24 hours) at 2022 1441  Last data filed at 2022 1239  Gross per 24 hour   Intake 360 ml   Output 700 ml   Net -340 ml       Physical Exam:   Physical Exam  Constitutional:       General: She is not in acute distress  Appearance: Normal appearance  She is normal weight  She is not ill-appearing  HENT:      Head: Normocephalic and atraumatic  Nose: Nose normal       Mouth/Throat:      Mouth: Mucous membranes are moist    Eyes:      Extraocular Movements: Extraocular movements intact  Pupils: Pupils are equal, round, and reactive to light  Cardiovascular:      Rate and Rhythm: Normal rate and regular rhythm  Pulses: Normal pulses  Heart sounds: Normal heart sounds  No murmur heard  No friction rub  No gallop  Pulmonary:      Effort: Pulmonary effort is normal  No respiratory distress        Breath sounds: Normal breath sounds  No wheezing, rhonchi or rales  Abdominal:      General: There is no distension  Palpations: Abdomen is soft  There is no mass  Tenderness: There is no abdominal tenderness  Hernia: No hernia is present  Comments: Left lower abdominal wall panniculitis noted   Musculoskeletal:         General: No swelling or tenderness  Normal range of motion  Cervical back: Normal range of motion and neck supple  No rigidity  Right lower leg: No edema  Left lower leg: No edema  Comments: Bilateral chronic lower extremity lymphedema   Skin:     General: Skin is warm  Capillary Refill: Capillary refill takes less than 2 seconds  Findings: No erythema or rash  Neurological:      General: No focal deficit present  Mental Status: She is alert and oriented to person, place, and time  Mental status is at baseline  Cranial Nerves: No cranial nerve deficit  Motor: No weakness  Psychiatric:         Mood and Affect: Mood normal          Behavior: Behavior normal          Additional Data:     Labs:    Results from last 7 days   Lab Units 07/27/22  0525   WBC Thousand/uL 8 82   HEMOGLOBIN g/dL 10 3*   HEMATOCRIT % 35 1   PLATELETS Thousands/uL 400*   NEUTROS PCT % 75   LYMPHS PCT % 12*   MONOS PCT % 7   EOS PCT % 4     Results from last 7 days   Lab Units 07/27/22  0525   SODIUM mmol/L 137   POTASSIUM mmol/L 4 1   CHLORIDE mmol/L 98   CO2 mmol/L 30   BUN mg/dL 28*   CREATININE mg/dL 1 29   CALCIUM mg/dL 8 5   ALK PHOS U/L 116*   ALT U/L 19   AST U/L 22     Results from last 7 days   Lab Units 07/27/22  0525   INR  2 03*               * I Have Reviewed All Lab Data Listed Above  * Additional Pertinent Lab Tests Reviewed:  [facility] Admission  Reviewed    Imaging:  Imaging Reports Reviewed Today Include:  None    Recent Cultures (last 7 days):           Last 24 Hours Medication List:   Current Facility-Administered Medications Medication Dose Route Frequency Provider Last Rate    acetaminophen  650 mg Oral Q6H PRN Nile Puls, MYA      allopurinol  100 mg Oral Daily Nile Puls, MYA      cefepime  2,000 mg Intravenous Q8H Elaine Neville MD      DULoxetine  20 mg Oral Daily Nile Puls, MYA      furosemide  40 mg Oral Daily [place], [place]      levothyroxine  125 mcg Oral Early Morning Nile Puls, PA-SURESH      ondansetron  4 mg Intravenous Q6H PRN Nile Puls, PA-SURESH      oxyCODONE  5 mg Oral Q6H PRN Nile Puls, MYA      vancomycin  125 mg Oral Q12H [facility] Nile PulsMYA      warfarin  2 mg Oral Once per day on Sun Mon Wed Fri Nile Puls, PA-SURESH      warfarin  4 mg Oral Once per day on Tue Thu Sat Eulogio Newell PA-C          Today, Patient Was Seen By: Elaine Neville MD    ** Please Note: Dictation voice to text software may have been used in the creation of this document   **

## 2022-07-27 NOTE — ASSESSMENT & PLAN NOTE
· Appreciate ID input  · Increased cefepime dosing from 2 g IV q 12 hours to 2 g IV Q 8 hours  · Will check on ultrasound  · Consult general surgery  · Continue wound care  · If workup is negative, anticipate transition to p o  Levaquin in discharge planning in 24-48 hours  · Repeat blood work in the a m

## 2022-07-27 NOTE — WOUND OSTOMY CARE
Consult Note - Wound   Leeann Span 80 y o  female MRN: [id number]  Unit/Bed#: -01 Encounter: [id number]      History and Present Illness:  80year old female admitted with panniculitis  Wound care consulted for skin breakdown to the pannus  She has a history of morbid obesity with a BMI of 78 38, chronic lymphedema  recurrent cellulitis, C-diff colitis, AFIB, CHF and hypothyroidism  Assessment Findings:   She was awake, alert & oriented x 4  The patient can be repositioned in bed with assist of 2 and is dependent upon nursing staff or caretaker for all of her needs  Patient has a Valenzuela Jaja in place for urinary management, and is not incontinent of stool  Patient can feed herself  Patient has IV antibiotics for cellulitis of the left thigh-hip  Patient is able to turn onto her side in the bed  At this time patient states she is okay with the current [employer] bed  1  Bilateral heels intact  Plantar surface of left foot blanchable purple area at the heel, patient has no pain  2  Buttocks and sacrum intact  Scattered areas of blanchable hyperpigmented skin noted to the buttocks and posterior thighs  3  Abdominal fold, bilateral thigh and knee folds with erythema  Mid abdominal fold distal to the unbilicus is an open wound, yellow and beefy red, partial thickness, scant yellow drainage  Patient states this area is prone to open, then heals and then opens again  Cleansed folds with soap and water, patted dry, and ABD pads placed in the folds, patient refuses Interdry, Maxorb rope or Maxorb AG to this and any of the folds  Will use ABD pads and 4X4 in thigh and knee folds  4  Bilateral breast folds intact  5  Left lateral thigh-hip cellulitis  Patient has recurrent cellulitis to this area  Area is red, edematous, with soft fluctuant area noted to the posterior edge  Cleansed and adult disposable brief placed as per patient request    Skin and Wound Care Plan:   1   Apply Hydraguard to b/l heels, buttocks and sacrum BID and PRN  2  Apply skin nourishing cream to the skin daily  3  Turn and reposition patient Q2 hours  4  Cleanse abdominal fold, thigh folds, knee folds and left lateral thigh with soap and water daily, pat dry  Apply Kenalog cream to areas of erythema to the folds, place ABD pads in abdominal fold and 4X4 or ABD pads to knee and thigh folds  Left lateral thigh place adult brief over area of erythema, change PRN with drainage    Wounds:  Wound 07/27/22 MASD Hip Anterior (Active)   Wound Image   07/27/22 1430   Wound Description Beefy red;Yellow 07/27/22 1430   Alison-wound Assessment Erythema;Fragile 07/27/22 1430   Wound Length (cm) 2 5 cm 07/27/22 1430   Wound Width (cm) 3 5 cm 07/27/22 1430   Wound Depth (cm) 0 1 cm 07/27/22 1430   Wound Surface Area (cm^2) 8 75 cm^2 07/27/22 1430   Wound Volume (cm^3) 0 875 cm^3 07/27/22 1430   Calculated Wound Volume (cm^3) 0 88 cm^3 07/27/22 1430   Tunneling 0 cm 07/27/22 1430   Drainage Amount Scant 07/27/22 1430   Drainage Description Yellow 07/27/22 1430   Non-staged Wound Description Partial thickness 07/27/22 1430   Treatments Cleansed 07/27/22 1430   Dressing ABD 07/27/22 1430   Wound packed?  No 07/27/22 1430   Patient Tolerance Tolerated well 07/27/22 1430       Wound 07/27/22 Other (comment) Cellulitis Thigh Left;Proximal;Lateral (Active)   Wound Description Beefy red;Edema 07/27/22 1430   Alison-wound Assessment Erythema;Fragile 07/27/22 1430   Drainage Amount Moderate 07/27/22 1430   Drainage Description Yellow 07/27/22 1430   Treatments Cleansed 07/27/22 1430   Dressing Other (Comment) 07/27/22 1430   Dressing Changed Changed 07/27/22 1430   Patient Tolerance Tolerated well 07/27/22 1430     Reviewed plan of care with primary RN Shaye Raygoza  Recommendations written as orders  Wound care team to follow weekly while admitted  Questions or concerns [facility] Nurse    Beny ELIZONDON, RN, [employer]

## 2022-07-27 NOTE — NURSING NOTE
Patient resting in bed at present  Denies pain, offers no complaints at this time  Call bell and belongings within reach

## 2022-07-27 NOTE — CONSULTS
Consultation - Infectious Disease   Scarlett Berry 80 y o  female MRN: [id number]  Unit/Bed#: -01 Encounter: [id number]      Inpatient consult to Infectious Diseases  Consult performed by: Rodriguez Claros MD  Consult ordered by: Renato Dowd MD            REQUIRED DOCUMENTATION:     1  This service was provided via Telemedicine  2  Provider located at [facility]  3  TeleMed provider: Rodriguez Claros MD   4  Identify all parties in room with patient during tele consult:RN  5  After connecting through [employer], patient was identified by name and date of birth and assistant checked wristband  Patient was then informed that this was a Telemedicine visit and that the exam was being conducted confidentially over secure lines  My office door was closed  No one else was in the room  Patient acknowledged consent and understanding of privacy and security of the Telemedicine visit, and gave us permission to have the assistant stay in the room in order to assist with the history and to conduct the exam   I informed the patient that I have reviewed their record in Epic and presented the opportunity for them to ask any questions regarding the visit today  The patient agreed to participate         Assessment/Recommendations     1  Recurrent panniculitis/ left hip cellulitis   - Recurrent cellulitis in the setting of morbid obesity, chronic lymphedema, increased moisture and superficial skin breakdown  - Previous wound cx with Pseudomonas, Proteus  - afebrile without leukocytosis      · Continue cefepime, increase dose to 2 q8h  · Recommend wound care consult  · Recommend ultrasound and surgery evaluation  · Serial exam  · Continue aggressive skin care, minimize friction and moisture related skin breakdown and recommend barrier creams  · Recommend frequent offloading, repositioning  · Recommend weight loss   · Discussed natural history of cellulitis and discussed with patient that she will continue to have recurrent cellulitis if underlying risk factors cannot be modified  · If work up is unremarkable, anticipate patient can be discharged on po levaquin 750 daily x 5 days     2  Recurrent C difficile colitis     - No evidence of diarrhea, active colitis        · Continue p o  Vancomycin prophylaxis x 72 hours beyond systemic antibiotic      3  Morbid obesity  - Risk factor for recurrent infection and failure of oral antibiotics for cellulitis     4  Drug allergies  - Hives with penicillin, bactrim    Thank you for involving me in the care of your patient  Please call with questions, change in clinical status or if tests recommended above are abnormal      Discussed with the primary service  History     Reason for Consult: Recurrent panniculitis, left hip cellulitis  HPI: Mason Tineo is a 80y o  year old female with morbid obesity, hx recurrent C diff, history of panniculitis and chronic lymphedema     She was recently admitted in May with bilateral panniculitis  She had slow clinical improvement on iv vanc and she was discharged to complete a 14 day course of po doxy/keflex  Of note, admission blood cx grew MRSE bacteremia (both sets) but she completed a course of iv therapy  She was then admitted couple of weeks later with recurrent abdominal redness and extending to the left hip with redness and drainage  Wound cx grew Pseudomonas and Proteus  Ultrasound was without abscess  She completed a 10 day course of therapy with resolution of symptoms  She was discharged home but shortly after developed recurrent drainage from the area around her left hip and redness and pain under her pannus  She was prescribed bactrim but had an allergic reaction  She developed recurrent diarrhea and reportedlt tested positive for c diff and was treated with 10 days of po vanc  Due to ongoing drainage and redness she was asked to return to the ER on 7/25  Her vitals were stable, labs noted normal white count   She was admitted on cefepime  She has been afeebrile overnight but continues to note drainage and "leaking"  Denies nausea, vomiting, diarrhea or rash and is tolerating antibiotics well  Infectious disease is being consulted for diagnostic work up and antibiotic management  Review of Systems  Pertinent positives and negatives as noted in HPI  Rest complete 12 point system-based review of systems is otherwise negative      PAST MEDICAL HISTORY:  Past Medical History:   Diagnosis Date    Atrial fibrillation ([place] [address] )     Bladder stone     C  difficile colitis     Cellulitis     CHF (congestive heart failure) ([place] [address] )     Depression with anxiety     Disease of thyroid gland     Diverticulitis     Enterocolitis     History of abnormal cervical Pap smear     Kidney stone     Lymphedema     Menopause     Age 53    Morbid obesity with BMI of 70 and over, adult ([place] [address] )     MRSA (methicillin resistant Staphylococcus aureus)     abdominal wound    Osteoarthritis     Phlebitis     left lower leg    Spondylolysis      Past Surgical History:   Procedure Laterality Date    APPENDECTOMY      CARPAL TUNNEL RELEASE      CHOLECYSTECTOMY      CYSTOSCOPY      stent    FOOT SURGERY      bone spur    KNEE SURGERY      WISDOM TOOTH EXTRACTION         FAMILY HISTORY:  Non-contributory    SOCIAL HISTORY:  Social History   Single  Social History     Substance and Sexual Activity   Alcohol Use Not Currently     Social History     Substance and Sexual Activity   Drug Use Not Currently    Types: Marijuana    Comment: As a teen - As per [employer]      Social History     Tobacco Use   Smoking Status Former Smoker    Packs/day: 5 00    Years: 3 00    Pack years: 15 00    Start date: 1967   Paulina Mancera Quit date: 1970    Years since quittin 1   Smokeless Tobacco Never Used   Tobacco Comment    5 packs/day until 5 (age 17-19) - As per [employer]        ALLERGIES:  Allergies   Allergen Reactions    Augmentin Es-600 [Amoxicillin-Pot Clavulanate] Anaphylaxis and Rash    Bactrim [Sulfamethoxazole-Trimethoprim] Anaphylaxis    Penicillins Anaphylaxis and Rash     Tolerates cefepime (21)    Omeprazole GI Intolerance    Sulfa Antibiotics Hives    Latex Rash and Edema    Vitamin C [Ascorbate - Food Allergy] Rash       MEDICATIONS:  All current active medications have been reviewed      Physical Exam     Temp:  [97 8 °F (36 6 °C)-98 1 °F (36 7 °C)] 98 1 °F (36 7 °C)  HR:  [62-77] 62  Resp:  [15-21] 19  BP: ()/(52-66) 106/56  SpO2:  [94 %-95 %] 95 %  Temp (24hrs), Av 9 °F (36 6 °C), Min:97 8 °F (36 6 °C), Max:98 1 °F (36 7 °C)  Current: Temperature: 98 1 °F (36 7 °C)    Intake/Output Summary (Last 24 hours) at 2022 1017  Last data filed at 2022 1008  Gross per 24 hour   Intake 120 ml   Output 700 ml   Net -580 ml         Physical exam findings reported by bedside and primary medical team staff    General Appearance:  Appearing chronically ill, nontoxic, and in no distress, appears stated age   Head:  Normocephalic, without obvious abnormality, atraumatic   Eyes:  PERRL, conjunctiva pink and sclera anicteric, both eyes   Nose: Nares normal, mucosa normal, no drainage   Throat: Oropharynx moist without lesions; lips, mucosa, and tongue normal; teeth and gums normal   Neck: Supple, symmetrical, trachea midline, no adenopathy, no tenderness/mass/nodules   Back:   Symmetric, no curvature, ROM normal, no CVA tenderness   Lungs:   Clear to auscultation bilaterally, no audible wheezes, rhonchi and rales, respirations unlabored   Chest Wall:  No tenderness or deformity   Heart:  Regular rate and rhythm, S1, S2 normal, no murmur, rub or gallop   Abdomen:   Soft, non-tender, non-distended, positive bowel sounds, no masses, no organomegaly    No CVA tenderness   Extremities: Extremities 2-3+ non pitting edema   Skin: Circumscribed ecchymotic appearing area over left hip, superficial skin breakdown, no obvious open wound  Superficial areas of skin breakdown under pannus with mild surrounding erythema   Lymph nodes: Cervical, supraclavicular, and axillary nodes normal   Neurologic: Alert and oriented times 3       Invasive Devices:   Peripheral IV 07/25/22 Right Antecubital (Active)   Site Assessment WDL 07/27/22 1000   Dressing Type Transparent 07/27/22 1000   Line Status Flushed;Saline locked 07/27/22 1000   Dressing Status Clean;Dry; Intact 07/27/22 1000   Reason Not Rotated Not due 07/27/22 1000       Labs, Imaging, & Other Studies     Lab Results:    I have personally reviewed pertinent labs  Results from last 7 days   Lab Units 07/27/22  0525 07/26/22  0550 07/25/22  2154   WBC Thousand/uL 8 82 9 77 10 88*   HEMOGLOBIN g/dL 10 3* 10 5* 11 2*   PLATELETS Thousands/uL 400* 401* 406*     Results from last 7 days   Lab Units 07/27/22  0525 07/26/22  0550 07/25/22  2154   POTASSIUM mmol/L 4 1   < > 4 9   CHLORIDE mmol/L 98   < > 96   CO2 mmol/L 30   < > 28   BUN mg/dL 28*   < > 28*   CREATININE mg/dL 1 29   < > 1 20   EGFR ml/min/1 73sq m 42   < > 45   CALCIUM mg/dL 8 5   < > 8 8   AST U/L 22  --  27   ALT U/L 19  --  14   ALK PHOS U/L 116*  --  134*    < > = values in this interval not displayed  Imaging Studies:   I have personally reviewed pertinent imaging study reports and images in PACS  EKG, Pathology, and Other Studies:   I have personally reviewed pertinent reports  Counseling/Coordination of care: Total 70 minutes communication with the patient via telehealth  Labs, medical tests and imaging studies were independently and extensively reviewed by me as noted above in HPI and old records were obtained and summarized as noted above in HPI  My recommendations were discussed with the patient in detail who verbalized understanding

## 2022-07-27 NOTE — ASSESSMENT & PLAN NOTE
· Patient is rate controlled at this time  · Will continue pre-hospital Coumadin 2 mg alternating with 4 mg daily  · INR is therapeutic

## 2022-07-28 LAB
ANION GAP SERPL CALCULATED.3IONS-SCNC: 8 MMOL/L (ref 4–13)
BASOPHILS # BLD AUTO: 0.05 THOUSANDS/ΜL (ref 0–0.1)
BASOPHILS NFR BLD AUTO: 1 % (ref 0–1)
BUN SERPL-MCNC: 31 MG/DL (ref 5–25)
CALCIUM SERPL-MCNC: 9 MG/DL (ref 8.4–10.2)
CHLORIDE SERPL-SCNC: 97 MMOL/L (ref 96–108)
CO2 SERPL-SCNC: 29 MMOL/L (ref 21–32)
CREAT SERPL-MCNC: 1.32 MG/DL (ref 0.6–1.3)
EOSINOPHIL # BLD AUTO: 0.37 THOUSAND/ΜL (ref 0–0.61)
EOSINOPHIL NFR BLD AUTO: 4 % (ref 0–6)
ERYTHROCYTE [DISTWIDTH] IN BLOOD BY AUTOMATED COUNT: 17.8 % (ref 11.6–15.1)
GFR SERPL CREATININE-BSD FRML MDRD: 40 ML/MIN/1.73SQ M
GLUCOSE SERPL-MCNC: 108 MG/DL (ref 65–140)
HCT VFR BLD AUTO: 36.3 % (ref 34.8–46.1)
HGB BLD-MCNC: 10.6 G/DL (ref 11.5–15.4)
IMM GRANULOCYTES # BLD AUTO: 0.04 THOUSAND/UL (ref 0–0.2)
IMM GRANULOCYTES NFR BLD AUTO: 0 % (ref 0–2)
INR PPP: 2.04 (ref 0.84–1.19)
LYMPHOCYTES # BLD AUTO: 1.01 THOUSANDS/ΜL (ref 0.6–4.47)
LYMPHOCYTES NFR BLD AUTO: 11 % (ref 14–44)
MCH RBC QN AUTO: 24.4 PG (ref 26.8–34.3)
MCHC RBC AUTO-ENTMCNC: 29.2 G/DL (ref 31.4–37.4)
MCV RBC AUTO: 84 FL (ref 82–98)
MONOCYTES # BLD AUTO: 0.66 THOUSAND/ΜL (ref 0.17–1.22)
MONOCYTES NFR BLD AUTO: 7 % (ref 4–12)
NEUTROPHILS # BLD AUTO: 7.33 THOUSANDS/ΜL (ref 1.85–7.62)
NEUTS SEG NFR BLD AUTO: 77 % (ref 43–75)
NRBC BLD AUTO-RTO: 0 /100 WBCS
PLATELET # BLD AUTO: 409 THOUSANDS/UL (ref 149–390)
PMV BLD AUTO: 8.4 FL (ref 8.9–12.7)
POTASSIUM SERPL-SCNC: 3.9 MMOL/L (ref 3.5–5.3)
PROTHROMBIN TIME: 22.9 SECONDS (ref 11.6–14.5)
RBC # BLD AUTO: 4.34 MILLION/UL (ref 3.81–5.12)
SODIUM SERPL-SCNC: 134 MMOL/L (ref 135–147)
WBC # BLD AUTO: 9.46 THOUSAND/UL (ref 4.31–10.16)

## 2022-07-28 PROCEDURE — 85025 COMPLETE CBC W/AUTO DIFF WBC: CPT | Performed by: HOSPITALIST

## 2022-07-28 PROCEDURE — 80048 BASIC METABOLIC PNL TOTAL CA: CPT | Performed by: HOSPITALIST

## 2022-07-28 PROCEDURE — 99222 1ST HOSP IP/OBS MODERATE 55: CPT

## 2022-07-28 PROCEDURE — 85610 PROTHROMBIN TIME: CPT | Performed by: HOSPITALIST

## 2022-07-28 PROCEDURE — 99232 SBSQ HOSP IP/OBS MODERATE 35: CPT | Performed by: HOSPITALIST

## 2022-07-28 PROCEDURE — 93005 ELECTROCARDIOGRAM TRACING: CPT

## 2022-07-28 RX ADMIN — DULOXETINE 20 MG: 20 CAPSULE, DELAYED RELEASE ORAL at 08:34

## 2022-07-28 RX ADMIN — WARFARIN SODIUM 4 MG: 4 TABLET ORAL at 17:34

## 2022-07-28 RX ADMIN — LEVOTHYROXINE SODIUM 125 MCG: 25 TABLET ORAL at 06:33

## 2022-07-28 RX ADMIN — Medication 125 MG: at 08:34

## 2022-07-28 RX ADMIN — ALLOPURINOL 100 MG: 100 TABLET ORAL at 08:34

## 2022-07-28 RX ADMIN — CEFEPIME HYDROCHLORIDE 2000 MG: 2 INJECTION, SOLUTION INTRAVENOUS at 06:33

## 2022-07-28 RX ADMIN — CEFEPIME HYDROCHLORIDE 2000 MG: 2 INJECTION, SOLUTION INTRAVENOUS at 22:18

## 2022-07-28 RX ADMIN — TRIAMCINOLONE ACETONIDE: 1 CREAM TOPICAL at 08:34

## 2022-07-28 RX ADMIN — ACETAMINOPHEN 650 MG: 325 TABLET ORAL at 06:42

## 2022-07-28 RX ADMIN — Medication 125 MG: at 21:44

## 2022-07-28 RX ADMIN — TRIAMCINOLONE ACETONIDE: 1 CREAM TOPICAL at 17:35

## 2022-07-28 RX ADMIN — SODIUM CHLORIDE 500 ML: 0.9 INJECTION, SOLUTION INTRAVENOUS at 10:02

## 2022-07-28 RX ADMIN — CEFEPIME HYDROCHLORIDE 2000 MG: 2 INJECTION, SOLUTION INTRAVENOUS at 15:11

## 2022-07-28 NOTE — ASSESSMENT & PLAN NOTE
· Appreciate ID input  · Continue cefepime 2 g IV q 12 hours while in-house, once case management is able to set up a safe discharge, patient okay to go home on oral Levaquin  · Abdominal ultrasound reviewed-no concern for abscesses  · Appreciate general surgical input-no acute phase intervention needed  · Medically stable for discharge  · Once again disposition as per case management

## 2022-07-28 NOTE — ASSESSMENT & PLAN NOTE
Wt Readings from Last 3 Encounters:   07/25/22 (!) 201 kg (442 lb 7 4 oz)   06/14/22 (!) 211 kg (464 lb 8 2 oz)   05/08/22 (!) 212 kg (467 lb)     · Currently without exacerbation  · Continue Coumadin, continue Lasix  · INR is therapeutic

## 2022-07-28 NOTE — NURSING NOTE
During leadership rounds patient upset over the care of her sister who is also a patient  She has concerns with her physical capabilities at home and noted she has had many falls and has 20 steps to climb at home  I told her I would alk to PT, case mgt and MD to follow up

## 2022-07-28 NOTE — CASE MANAGEMENT
Case Management Assessment & Discharge Planning Note    Patient name Anyi Kirk  Location [address] 224/-09 MRN [id number]  : 1950 Date 2022       Current Admission Date: 2022  Current Admission Diagnosis:Panniculitis   Patient Active Problem List    Diagnosis Date Noted    BMI 70 and over, adult ([place] [address] ) 2022    Chronic atrial fibrillation ([place] [address] ) 2022    History of Clostridioides difficile infection 2022    Lymphedema of extremity 2021    Other specified hypothyroidism 10/03/2020    Mild episode of recurrent major depressive disorder ([place] [address] ) 10/03/2020    Idiopathic chronic 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 10/03/2020    Primary osteoarthritis involving multiple joints 10/03/2020    Class 3 severe obesity due to excess calories with serious comorbidity and body mass index (BMI) greater than or equal to 70 in adult [facility]) 10/03/2020    Chronic diastolic congestive heart failure ([place] [address] ) 10/03/2020    Panniculitis 10/03/2020    Gastroesophageal reflux disease without esophagitis 10/03/2020    Hx MRSA infection 2020    Functional gait abnormality 2019    Impaired mobility 2019    Osteoarthritis of glenohumeral joint 2019    Left ovarian cyst 2017    Depression with anxiety 07/15/2017    Anemia 2016    Ambulatory dysfunction 2016    Adjustment disorder 2013    Irritable bowel syndrome 2012    Left atrial enlargement 2012    Left ventricular hypertrophy 2012    Carpal tunnel syndrome 2012    Gout 2012    Hyperlipidemia 2012    Osteoarthritis 2012    Symptomatic menopausal or female climacteric states 2012      LOS (days): 1  Geometric Mean LOS (GMLOS) (days): 2 90  Days to GMLOS:1 1     OBJECTIVE:    Risk of Unplanned Readmission Score: 37 85         Current admission status: Inpatient  Referral Reason: Other (d/c planning)    Preferred Pharmacy: [facility], [address]  [address]  [place] [address]  Phone: [phone] Fax: [phone] [address] [place], [place] - [address]  [address]  [facility] [address]  Phone: [phone] Fax: [phone]    Primary Care Provider: Ladonna Mcclain MD    Primary Insurance: MEDICARE  Secondary Insurance: AARP    ASSESSMENT:  Jessica 26 Proxies    There are no active Health Care Proxies on file  Readmission Root Cause  30 Day Readmission: No    Patient Information  Admitted from[de-identified] Home  Mental Status: Alert  During Assessment patient was accompanied by: Not accompanied during assessment  Assessment information provided by[de-identified] Patient  Primary Caregiver: Private caregiver  Caregiver's Name[de-identified] Jacinto Antonio  [phone]  Caregiver's Relationship to Patient[de-identified] Other (Specify) (private caregivers from [facility])  [facility] Telephone Number[de-identified] [phone]  Support Systems: Family members (niece and nephew)  [place] of Residence: [address] do [address] live in?: [address] access options   Select all that apply : Ramp  Type of Current Residence: 2 story home  Upon entering residence, is there a bedroom on the main floor (no further steps)?: No  A bedroom is located on the following floor levels of residence (select all that apply):: 2nd Floor  Upon entering residence, is there a bathroom on the main floor (no further steps)?: Yes (1/2 bath)  Number of steps to 2nd floor from main floor: One Flight (staair glide   pt has been staying to the 1st floor)  In the last 12 months, was there a time when you were not able to pay the mortgage or rent on time?: No  In the last 12 months, how many places have you lived?: 1  In the last 12 months, was there a time when you did not have a steady place to sleep or slept in a shelter (including now)?: No  Homeless/housing insecurity resource given?: N/A  Living Arrangements: Lives Alone, Other (Comment) (pt has caregivers from 8 am to 8 pm)  Is patient a ?: No    Activities of Daily Living Prior to Admission  Functional Status: Assistance  Completes ADLs independently?: No  Level of ADL dependence: Assistance  Ambulates independently?: No  Level of ambulatory dependence: Total Dependent  Does patient use assisted devices?: Yes  Assisted Devices (DME) used:  Wheelchair (2 [employer])  Does patient currently own DME?: Yes  What DME does the patient currently own?: Other (Comment), Hospital Bed, Wheelchair, Walker (2 slide boards, pulse ox, bp cuff,)  Does patient have a history of Outpatient Therapy (PT/OT)?: No  Does the patient have a history of Short-Term Rehab?: Yes ([facility])  Does patient have a history of HHC?: Yes ([facility])  Does patient currently have [facility] [address]?: Yes    Current Home Health Care  Type of Current Home Care Services: Nurse visit  Current Home Health Agency[de-identified] [address] [place] [address] Provider[de-identified] PCP    Patient Information Continued  Income Source: Pension/USP  Does patient have prescription coverage?: Yes (Susan Crain  she does go to [facility] for her [place])  Within the past 12 months, you worried that your food would run out before you got the money to buy more : Never true  Within the past 12 months, the food you bought just didn't last and you didn't have money to get more : Never true  Food insecurity resource given?: N/A  Does patient receive dialysis treatments?: No  Does patient have a history of substance abuse?: No  Does patient have a history of Mental Health Diagnosis?: Yes (depression)  Is patient receiving treatment for mental health?: Yes (medication from her pcp)  Has patient received inpatient treatment related to mental health in the last 2 years?: No         Means of Transportation  Means of Transport to Appts[de-identified] Other (Comment) (bls)  In the past 12 months, has lack of transportation kept you from medical appointments or from getting medications?: No  In the past 12 months, has lack of transportation kept you from meetings, work, or from getting things needed for daily living?: No  Was application for public transport provided?: N/A        DISCHARGE DETAILS:    Discharge planning discussed with[de-identified] patient  Freedom of Choice: Yes  Comments - Freedom of Choice: pt is active with [address] [place] [address] and caregivers from [facility]                               Other Referral/Resources/Interventions Provided:  Interventions: Iliana [address], Other ([facility])  Referral Comments: referrals sent to [facility]  and [facility] was called at 11:55 am and em Chacko and I made her aware the [facility] to [facility] to [facility], she stated she  understood and is working on getting a care giver and she would call cm back, cnm did not receive a call back, cm called again at 16:01 pm [phone] and spoke with [facility] and she stated they have not found a caregiver yet,    Would you like to participate in our [address] service program?  : No - Declined    Treatment Team Recommendation: Home with [facility] [employer] (home with [facility] &caregiver & outpt follow up- bls)

## 2022-07-28 NOTE — CONSULTS
Consultation - General Surgery   Dileep Enamorado 80 y o  female MRN: [id number]  Unit/Bed#: -01 Encounter: [id number]    Assessment:  80 y o  female presenting with panniculitis   Afebrile, episodes of hypotension with SBP 70-90s DBP 40-60s, other vital signs stable on room air   WBC 9 46 form 8 82   Hgb 10 6 from 10 3   Cr 1 32 from 1 29   US 07/27/2022 with "Edematous subcutaneous tissues overlying the left hip without evidence of cellulitis or abscess "    Plan:  No acute surgical intervention as US without drainable collection   Continue supportive care and local wound care  Medical management per SLIM, appreciate recommendations  ID following, appreciate recommendations  Discussed with Dr Jean Claude Weaver    History of Present Illness   Chief Complaint: panniculitis    HPI:  Dileep Enamorado is a 80 y o  female with past medical history significant for atrial fibrillation on warfarin, CHF, anxiety/depression, hypothyroidism, diverticulosis, lymphedema, morbid obesity, and previous MRSA infection  who initially presented to [facility] ED from suggestion from PCP with complaints of abdominal pain  Patient known to general surgery service from previous admission for complaints of panniculitis  Did not require I&D during last admission as no collection noted on US  Managed conservatively with IV antibiotics  General surgery consulted for evaluation of left hip panniculitis at suggestion from ID  US completed which does not show drainable collection  Patient noted that has had significant increase in amount of serous drianage from left hip since last admission requiring excess dressings  Mild tenderness to palpation  Since last admission, no major changes to medical record  Review of Systems   Constitutional: Positive for fatigue  Negative for activity change, appetite change, chills and fever  HENT: Negative  Respiratory: Positive for cough  Negative for shortness of breath      Cardiovascular: Negative for chest pain, palpitations and leg swelling  Gastrointestinal: Positive for abdominal pain, diarrhea, nausea and vomiting  Negative for abdominal distention and constipation  Endocrine: Negative for polydipsia, polyphagia and polyuria  Genitourinary: Negative for difficulty urinating, dysuria, flank pain, frequency and urgency  Musculoskeletal: Negative for arthralgias and myalgias  Skin: Positive for color change  Negative for pallor, rash and wound  Neurological: Negative for dizziness, weakness, light-headedness, numbness and headaches          Historical Information   Past Medical History:   Diagnosis Date    Atrial fibrillation ([place] [address] )     Bladder stone     C  difficile colitis     Cellulitis     CHF (congestive heart failure) ([place] [address] )     Depression with anxiety     Disease of thyroid gland     Diverticulitis     Enterocolitis     History of abnormal cervical Pap smear     Kidney stone     Lymphedema     Menopause     Age 53    Morbid obesity with BMI of 70 and over, adult ([place] [address] )     MRSA (methicillin resistant Staphylococcus aureus)     abdominal wound    Osteoarthritis     Phlebitis     left lower leg    Spondylolysis      Past Surgical History:   Procedure Laterality Date    APPENDECTOMY      CARPAL TUNNEL RELEASE      CHOLECYSTECTOMY      CYSTOSCOPY      stent    FOOT SURGERY  1982    bone spur    KNEE SURGERY      WISDOM TOOTH EXTRACTION       Social History   Social History     Substance and Sexual Activity   Alcohol Use Not Currently     Social History     Substance and Sexual Activity   Drug Use Not Currently    Types: Marijuana    Comment: As a teen - As per [employer]      E-Cigarette/Vaping    E-Cigarette Use Never User      E-Cigarette/Vaping Substances    Nicotine No     THC No     CBD No     Flavoring No     Other No     Unknown No      Social History     Tobacco Use   Smoking Status Former Smoker    Packs/day: 5 00    Years: 3 00    Pack years: 15 00  Start date: 1967   [facility] Quit date: 1970    Years since quittin 1   Smokeless Tobacco Never Used   Tobacco Comment    5 packs/day until 6 (age [age]) - As per [employer]      Family History:   Family History   Problem Relation Age of Onset    Heart failure Mother     Heart disease Mother     Lymphoma Mother     Seizures Mother     Kidney disease Father     Heart disease Father     Heart failure Father     Diabetes type II Father     Diabetes type II Sister     Diabetes type II Brother     Uterine cancer Paternal Aunt     Breast cancer Neg Hx     Colon cancer Neg Hx     Ovarian cancer Neg Hx        Meds/Allergies   all current active meds have been reviewed  Allergies   Allergen Reactions    Augmentin Es-600 [Amoxicillin-Pot Clavulanate] Anaphylaxis and Rash    Bactrim [Sulfamethoxazole-Trimethoprim] Anaphylaxis    Penicillins Anaphylaxis and Rash     Tolerates cefepime (21)    Omeprazole GI Intolerance    Sulfa Antibiotics Hives    Latex Rash and Edema    Vitamin C [Ascorbate - Food Allergy] Rash       Objective   First Vitals:   Blood Pressure: 115/59 (22)  Pulse: 82 (22)  Temperature: 97 8 °F (36 6 °C) (22)  Temp Source: Tympanic (22)  Respirations: 18 (22)  Height: 5' 3" (160 cm) (22 1543)  Weight - Scale: (!) 201 kg (442 lb 7 4 oz) (22)  SpO2: 95 % (22)    Current Vitals:   Blood Pressure: 99/55 (22 1036)  Pulse: 69 (22 1036)  Temperature: 98 2 °F (36 8 °C) (22)  Temp Source: Temporal (22)  Respirations: 18 (22 1036)  Height: 5' 3" (160 cm) (22 1543)  Weight - Scale: (!) 201 kg (442 lb 7 4 oz) (22)  SpO2: 94 % (22 1036)      Intake/Output Summary (Last 24 hours) at 2022 1055  Last data filed at 2022 0800  Gross per 24 hour   Intake 840 ml   Output --   Net 840 ml       Invasive Devices  Report    Peripheral Intravenous Line  Duration           Peripheral IV 07/25/22 Right Antecubital 2 days                Physical Exam  Vitals and nursing note reviewed  Constitutional:       General: She is not in acute distress  Appearance: She is morbidly obese  She is not ill-appearing or toxic-appearing  HENT:      Head: Normocephalic and atraumatic  Cardiovascular:      Rate and Rhythm: Normal rate and regular rhythm  Pulses: Normal pulses  Heart sounds: Normal heart sounds  No murmur heard  No friction rub  No gallop  Pulmonary:      Effort: Pulmonary effort is normal  No respiratory distress  Breath sounds: Decreased breath sounds present  No wheezing, rhonchi or rales  Abdominal:      General: Bowel sounds are normal  There is no distension  Palpations: Abdomen is soft  Tenderness: There is no abdominal tenderness  There is no guarding or rebound  Comments: Right abdomen with non erythematous pannus, no drainage; left hip with erythematous and draining panus, no discernable fluctuance or abscess     Musculoskeletal:      Right lower leg: Edema present  Left lower leg: Edema present  Skin:     General: Skin is warm and dry  Capillary Refill: Capillary refill takes less than 2 seconds  Neurological:      General: No focal deficit present  Mental Status: She is alert and oriented to person, place, and time  Psychiatric:         Mood and Affect: Mood normal          Behavior: Behavior normal          Thought Content: Thought content normal        Lab Results:   I have personally reviewed pertinent lab results      CBC:   Lab Results   Component Value Date    WBC 9 46 07/28/2022    HGB 10 6 (L) 07/28/2022    HCT 36 3 07/28/2022    MCV 84 07/28/2022     (H) 07/28/2022    MCH 24 4 (L) 07/28/2022    MCHC 29 2 (L) 07/28/2022    RDW 17 8 (H) 07/28/2022    MPV 8 4 (L) 07/28/2022    NRBC 0 07/28/2022     CMP:   Lab Results   Component Value Date    SODIUM 134 (L) 07/28/2022    K 3 9 07/28/2022    CL 97 07/28/2022    CO2 29 07/28/2022    BUN 31 (H) 07/28/2022    CREATININE 1 32 (H) 07/28/2022    CALCIUM 9 0 07/28/2022    EGFR 40 07/28/2022     Coagulation:   Lab Results   Component Value Date    INR 2 04 (H) 07/28/2022     Imaging: I have personally reviewed pertinent reports  EKG, Pathology, and Other Studies: I have personally reviewed pertinent reports  Code Status: Level 1 - Full Code  Advance Directive and Living Will:      Power of :    POLST:      Counseling / Coordination of Care  Total floor / unit time spent today 15 minutes  Greater than 50% of total time was spent with the patient and / or family counseling and / or coordination of care  A description of the counseling / coordination of care: reviewing pertinent laboratory studies and diagnostic images, evaluation of patient       Anastacia Welch PA-C

## 2022-07-28 NOTE — PLAN OF CARE
Problem: MOBILITY - ADULT  Goal: Maintain or return to baseline ADL function  Description: INTERVENTIONS:  -  Assess patient's ability to carry out ADLs; assess patient's baseline for ADL function and identify physical deficits which impact ability to perform ADLs (bathing, care of mouth/teeth, toileting, grooming, dressing, etc )  - Assess/evaluate cause of self-care deficits   - Assess range of motion  - Assess patient's mobility; develop plan if impaired  - Assess patient's need for assistive devices and provide as appropriate  - Encourage maximum independence but intervene and supervise when necessary  - Involve family in performance of ADLs  - Assess for home care needs following discharge   - Consider OT consult to assist with ADL evaluation and planning for discharge  - Provide patient education as appropriate  Outcome: Progressing  Goal: Maintains/Returns to pre admission functional level  Description: INTERVENTIONS:  - Perform BMAT or MOVE assessment daily    - Set and communicate daily mobility goal to care team and patient/family/caregiver  - Collaborate with rehabilitation services on mobility goals if consulted  - Perform Range of Motion 4 times a day  - Reposition patient every 2 hours    - Dangle patient 3 times a day  - Stand patient 3 times a day  - Ambulate patient 3 times a day  - Out of bed to chair 3 times a day   - Out of bed for meals 3 times a day  - Out of bed for toileting  - Record patient progress and toleration of activity level   Outcome: Progressing

## 2022-07-28 NOTE — PROGRESS NOTES
Toby 46  Progress Note - Terra Snellen 1950, 80 y o  female MRN: [id number]  Unit/Bed#: -01 Encounter: [id number]  Primary Care Provider: Sarah Yarbrough MD   Date and time admitted to hospital: 7/25/2022  8:32 PM    * Panniculitis  Assessment & Plan  · Appreciate ID input  · Continue cefepime 2 g IV q 12 hours while in-house, once case management is able to set up a safe discharge, patient okay to go home on oral Levaquin  · Abdominal ultrasound reviewed-no concern for abscesses  · Appreciate general surgical input-no acute phase intervention needed  · Continue local wound care with triamcinolone  · Medically stable for discharge  · Once again disposition as per case management    History of Clostridioides difficile infection  Assessment & Plan  · Continue vancomycin 125 mg p o  q 12 hours for 72 hours beyond completion of the oral Levaquin course    Chronic diastolic congestive heart failure ([facility] )  Assessment & Plan  Wt Readings from Last 3 Encounters:   07/25/22 (!) 201 kg (442 lb 7 4 oz)   06/14/22 (!) 211 kg (464 lb 8 2 oz)   05/08/22 (!) 212 kg (467 lb)     · Currently without exacerbation  · Continue Coumadin, continue Lasix  · INR is therapeutic        Chronic atrial fibrillation ([facility] )  Assessment & Plan  · Patient is rate controlled at this time  · Will continue pre-hospital Coumadin 2 mg alternating with 4 mg daily  · INR is therapeutic    Other specified hypothyroidism  Assessment & Plan  · Continue levothyroxine    Mild episode of recurrent major depressive disorder ([facility] )  Assessment & Plan  · Continue Cymbalta    Class 3 severe obesity due to excess calories with serious comorbidity and body mass index (BMI) greater than or equal to 70 in adult [facility])  Assessment & Plan  · Actual BMI 80 93  · Dietary, weight loss, and lifestyle modification counseling was provided        VTE Prophylaxis:  Warfarin (Coumadin) - systemically anticoagulated    Patient Centered Rounds: I have performed bedside rounds with nursing staff today  Discussions with Specialists or Other Care Team Provider:  Infectious Disease, General surgery  Education and Discussions with Family / Patient:  Patient was brought up to par with the plan of today, she did ask and or want me to call anyone    Current Length of Stay: 2 day(s)    Current Patient Status: Inpatient   Certification Statement: The patient will continue to require additional inpatient hospital stay due to The need for case management to secure a safe discharge plan    Discharge Plan:  Patient is medically stable for discharge    Code Status: Level 1 - Full Code    Subjective:   Patient seen and examined, finishing breakfast, no new complaints, no distress    Objective:     Vitals:   Temp (24hrs), Av 1 °F (36 7 °C), Min:97 8 °F (36 6 °C), Max:98 2 °F (36 8 °C)    Temp:  [97 8 °F (36 6 °C)-98 2 °F (36 8 °C)] 98 2 °F (36 8 °C)  HR:  [67-78] 69  Resp:  [18-20] 18  BP: ()/(45-62) 99/55  SpO2:  [94 %-96 %] 94 %  Body mass index is 78 38 kg/m²  Input and Output Summary (last 24 hours): Intake/Output Summary (Last 24 hours) at 2022 1254  Last data filed at 2022 0800  Gross per 24 hour   Intake 600 ml   Output --   Net 600 ml       Physical Exam:   Physical Exam  Constitutional:       General: She is not in acute distress  Appearance: Normal appearance  She is normal weight  She is not ill-appearing  HENT:      Head: Normocephalic and atraumatic  Nose: Nose normal       Mouth/Throat:      Mouth: Mucous membranes are moist    Eyes:      Extraocular Movements: Extraocular movements intact  Pupils: Pupils are equal, round, and reactive to light  Cardiovascular:      Rate and Rhythm: Normal rate and regular rhythm  Pulses: Normal pulses  Heart sounds: Normal heart sounds  No murmur heard  No friction rub  No gallop     Pulmonary:      Effort: Pulmonary effort is normal  No respiratory distress  Breath sounds: Normal breath sounds  No wheezing, rhonchi or rales  Abdominal:      General: There is no distension  Palpations: Abdomen is soft  There is no mass  Tenderness: There is no abdominal tenderness  Hernia: No hernia is present  Comments: Morbidly obese, left-sided anterior abdominal wall panniculitis   Musculoskeletal:         General: No swelling or tenderness  Normal range of motion  Cervical back: Normal range of motion and neck supple  No rigidity  Right lower leg: No edema  Left lower leg: No edema  Skin:     General: Skin is warm  Capillary Refill: Capillary refill takes less than 2 seconds  Findings: No erythema or rash  Neurological:      General: No focal deficit present  Mental Status: She is alert and oriented to person, place, and time  Mental status is at baseline  Cranial Nerves: No cranial nerve deficit  Motor: No weakness  Psychiatric:         Mood and Affect: Mood normal          Behavior: Behavior normal          Additional Data:     Labs:    Results from last 7 days   Lab Units 07/28/22  0444   WBC Thousand/uL 9 46   HEMOGLOBIN g/dL 10 6*   HEMATOCRIT % 36 3   PLATELETS Thousands/uL 409*   NEUTROS PCT % 77*   LYMPHS PCT % 11*   MONOS PCT % 7   EOS PCT % 4     Results from last 7 days   Lab Units 07/28/22  0444 07/27/22  0525   SODIUM mmol/L 134* 137   POTASSIUM mmol/L 3 9 4 1   CHLORIDE mmol/L 97 98   CO2 mmol/L 29 30   BUN mg/dL 31* 28*   CREATININE mg/dL 1 32* 1 29   CALCIUM mg/dL 9 0 8 5   ALK PHOS U/L  --  116*   ALT U/L  --  19   AST U/L  --  22     Results from last 7 days   Lab Units 07/28/22  0444   INR  2 04*               * I Have Reviewed All Lab Data Listed Above  * Additional Pertinent Lab Tests Reviewed:  [address] Admission  Reviewed    Imaging:  Imaging Reports Reviewed Today Include:  Abdominal ultrasound-no abscess    Recent Cultures (last 7 days): Last 24 Hours Medication List:   Current Facility-Administered Medications   Medication Dose Route Frequency Provider Last Rate    acetaminophen  650 mg Oral Q6H PRN Emmys MYA Yu      allopurinol  100 mg Oral Daily Delmas MYA Yu      cefepime  2,000 mg Intravenous Q8H Amber Chappell MD 2,000 mg (07/28/22 1048)    DULoxetine  20 mg Oral Daily Delmas Gigi, PA-C      furosemide  40 mg Oral Daily Delmas Gigi, PA-C      levothyroxine  125 mcg Oral Early Morning Delmas Gigi, PA-SURESH      ondansetron  4 mg Intravenous Q6H PRN Delmas Gigi, PA-SURESH      oxyCODONE  5 mg Oral Q6H PRN Chilangomas Gigi, MYA      triamcinolone   Topical BID Amber Chappell MD      [facility]com[facility]  [facility]12H [facility]      warfarin  2 mg Oral Once per day on Sun Mon Wed Fri Emmys VIRGINIE Yu-SURESH      warfarin  4 mg Oral Once per day on Tue Thu Sat Emmys MYA Yu          Today, Patient Was Seen By: Amber Chappell MD    ** Please Note: Dictation voice to text software may have been used in the creation of this document   **

## 2022-07-28 NOTE — CASE MANAGEMENT
Case Management Discharge Planning Note    Patient name Marge Arechiga  Location [address] 224/-99 MRN [id number]  : 1950 Date 2022       Current Admission Date: 2022  Current Admission Diagnosis:Panniculitis   Patient Active Problem List    Diagnosis Date Noted    BMI 70 and over, adult ([facility] ) 2022    Chronic atrial fibrillation ([place] [address] ) 2022    History of Clostridioides difficile infection 2022    Lymphedema of extremity 2021    Other specified hypothyroidism 10/03/2020    Mild episode of recurrent major depressive disorder ([place] [address] ) 10/03/2020    Idiopathic chronic 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 10/03/2020    Primary osteoarthritis involving multiple joints 10/03/2020    Class 3 severe obesity due to excess calories with serious comorbidity and body mass index (BMI) greater than or equal to 70 in adult [facility]) 10/03/2020    Chronic diastolic congestive heart failure ([facility] ) 10/03/2020    Panniculitis 10/03/2020    Gastroesophageal reflux disease without esophagitis 10/03/2020    Hx MRSA infection 2020    Functional gait abnormality 2019    Impaired mobility 2019    Osteoarthritis of glenohumeral joint 2019    Left ovarian cyst 2017    Depression with anxiety 07/15/2017    Anemia 2016    Ambulatory dysfunction 2016    Adjustment disorder 2013    Irritable bowel syndrome 2012    Left atrial enlargement 2012    Left ventricular hypertrophy 2012    Carpal tunnel syndrome 2012    Gout 2012    Hyperlipidemia 2012    Osteoarthritis 2012    Symptomatic menopausal or female climacteric states 2012      LOS (days): 2  Geometric Mean LOS (GMLOS) (days): 2 90  Days to GMLOS:0 2     OBJECTIVE:  Risk of Unplanned Readmission Score: 40 05         Current admission status: Inpatient   Preferred Pharmacy:    [address] [place]  [address]  [place] [address] [facility]  Phone: [phone] Fax: [phone]    [facility] [place], [place] [address] [place] [address]  Phone: [phone] Fax: [phone]    Primary Care Provider: Ladonna Mcclain MD    Primary Insurance: MEDICARE  Secondary Insurance: AARP    DISCHARGE DETAILS:    Discharge planning discussed with[de-identified] cm placed a call to Radha Finn at 9:50 & 11:09 am  niece was called  @15:20pm message was left to call to discuss d/c plan  Freedom of Choice: Yes  Comments - Freedom of Choice: pt is active with [facility] and [facility] caregivers  12 hrs per/day  CM contacted family/caregiver?: Yes             Contacts  Patient Contacts: Melissa Chavarria  Relationship to Patient[de-identified]  (niece)  Contact Method: Phone  Phone Number: [phone]  Reason/Outcome: Discharge [address]         [facility] the [facility] Yes  [address] requested[de-identified] [address] Name[de-identified] [facility] [employer] Provider[de-identified] PCP  [address] Needed[de-identified] Wound/Ostomy Care  Homebound Criteria Met[de-identified] Uses an Assist Device (i e  cane, walker, etc), Requires the Assistance of Another Person for Safe Ambulation or to Leave the Home  Supporting Clincal Findings[de-identified] Bed Bound or Wheelchair Bound         Other Referral/Resources/Interventions Provided:  Referral Comments: pt is active with ERNEST[facility] Gregg  home care,, I had to leave a message for Ginna at 9:50 am  and she called me back at 11:09 am  to let me know she found a care giver for the weekend but no care givers for Monday - Thursday  her assisgned care giver is able to start next Friday    Would you like to participate in our [address] service program?  : No - Declined    Treatment Team Recommendation:  (d/c plan tbd- bls)

## 2022-07-28 NOTE — PLAN OF CARE
Problem: MOBILITY - ADULT  Goal: Maintain or return to baseline ADL function  Description: INTERVENTIONS:  -  Assess patient's ability to carry out ADLs; assess patient's baseline for ADL function and identify physical deficits which impact ability to perform ADLs (bathing, care of mouth/teeth, toileting, grooming, dressing, etc )  - Assess/evaluate cause of self-care deficits   - Assess range of motion  - Assess patient's mobility; develop plan if impaired  - Assess patient's need for assistive devices and provide as appropriate  - Encourage maximum independence but intervene and supervise when necessary  - Involve family in performance of ADLs  - Assess for home care needs following discharge   - Consider OT consult to assist with ADL evaluation and planning for discharge  - Provide patient education as appropriate  Outcome: Progressing  Goal: Maintains/Returns to pre admission functional level  Description: INTERVENTIONS:  - Perform BMAT or MOVE assessment daily    - Set and communicate daily mobility goal to care team and patient/family/caregiver  - Collaborate with rehabilitation services on mobility goals if consulted  - Perform Range of Motion  - Reposition patient every 2 hours    - Record patient progress and toleration of activity level   Outcome: Progressing     Problem: Prexisting or High Potential for Compromised Skin Integrity  Goal: Skin integrity is maintained or improved  Description: INTERVENTIONS:  - Identify patients at risk for skin breakdown  - Assess and monitor skin integrity  - Assess and monitor nutrition and hydration status  - Monitor labs   - Assess for incontinence   - Turn and reposition patient  - Assist with mobility/ambulation  - Relieve pressure over bony prominences  - Avoid friction and shearing  - Provide appropriate hygiene as needed including keeping skin clean and dry  - Evaluate need for skin moisturizer/barrier cream  - Collaborate with interdisciplinary team   - Patient/family teaching  - Consider wound care consult   Outcome: Progressing     Problem: Potential for Falls  Goal: Patient will remain free of falls  Description: INTERVENTIONS:  - Educate patient/family on patient safety including physical limitations  - Instruct patient to call for assistance with activity   - Consult OT/PT to assist with strengthening/mobility   - Keep Call bell within reach  - Keep bed low and locked with side rails adjusted as appropriate  - Keep care items and personal belongings within reach  - Initiate and maintain comfort rounds  - Make Fall Risk Sign visible to staff  - Offer Toileting every 2 Hours, in advance of need  - Initiate/Maintain bed alarm  - Obtain necessary fall risk management equipment  - Apply yellow socks and bracelet for high fall risk patients  - Consider moving patient to room near nurses station  Outcome: Progressing

## 2022-07-28 NOTE — ASSESSMENT & PLAN NOTE
· Continue vancomycin 125 mg p o  q 12 hours for 72 hours beyond completion of the oral Levaquin course

## 2022-07-29 LAB
ANION GAP SERPL CALCULATED.3IONS-SCNC: 9 MMOL/L (ref 4–13)
ATRIAL RATE: 63 BPM
BASOPHILS # BLD AUTO: 0.05 THOUSANDS/ΜL (ref 0–0.1)
BASOPHILS NFR BLD AUTO: 1 % (ref 0–1)
BUN SERPL-MCNC: 33 MG/DL (ref 5–25)
CALCIUM SERPL-MCNC: 8.8 MG/DL (ref 8.4–10.2)
CHLORIDE SERPL-SCNC: 98 MMOL/L (ref 96–108)
CO2 SERPL-SCNC: 26 MMOL/L (ref 21–32)
CREAT SERPL-MCNC: 1.24 MG/DL (ref 0.6–1.3)
EOSINOPHIL # BLD AUTO: 0.34 THOUSAND/ΜL (ref 0–0.61)
EOSINOPHIL NFR BLD AUTO: 4 % (ref 0–6)
ERYTHROCYTE [DISTWIDTH] IN BLOOD BY AUTOMATED COUNT: 17.9 % (ref 11.6–15.1)
GFR SERPL CREATININE-BSD FRML MDRD: 44 ML/MIN/1.73SQ M
GLUCOSE SERPL-MCNC: 123 MG/DL (ref 65–140)
HCT VFR BLD AUTO: 35.1 % (ref 34.8–46.1)
HGB BLD-MCNC: 10.4 G/DL (ref 11.5–15.4)
IMM GRANULOCYTES # BLD AUTO: 0.04 THOUSAND/UL (ref 0–0.2)
IMM GRANULOCYTES NFR BLD AUTO: 0 % (ref 0–2)
INR PPP: 2.19 (ref 0.84–1.19)
LYMPHOCYTES # BLD AUTO: 0.93 THOUSANDS/ΜL (ref 0.6–4.47)
LYMPHOCYTES NFR BLD AUTO: 10 % (ref 14–44)
MCH RBC QN AUTO: 24.9 PG (ref 26.8–34.3)
MCHC RBC AUTO-ENTMCNC: 29.6 G/DL (ref 31.4–37.4)
MCV RBC AUTO: 84 FL (ref 82–98)
MONOCYTES # BLD AUTO: 0.63 THOUSAND/ΜL (ref 0.17–1.22)
MONOCYTES NFR BLD AUTO: 7 % (ref 4–12)
NEUTROPHILS # BLD AUTO: 7.21 THOUSANDS/ΜL (ref 1.85–7.62)
NEUTS SEG NFR BLD AUTO: 78 % (ref 43–75)
NRBC BLD AUTO-RTO: 0 /100 WBCS
PLATELET # BLD AUTO: 409 THOUSANDS/UL (ref 149–390)
PMV BLD AUTO: 8.7 FL (ref 8.9–12.7)
POTASSIUM SERPL-SCNC: 3.7 MMOL/L (ref 3.5–5.3)
PROTHROMBIN TIME: 24.2 SECONDS (ref 11.6–14.5)
QRS AXIS: -4 DEGREES
QRSD INTERVAL: 78 MS
QT INTERVAL: 418 MS
QTC INTERVAL: 427 MS
RBC # BLD AUTO: 4.17 MILLION/UL (ref 3.81–5.12)
SODIUM SERPL-SCNC: 133 MMOL/L (ref 135–147)
T WAVE AXIS: 114 DEGREES
VENTRICULAR RATE: 63 BPM
WBC # BLD AUTO: 9.2 THOUSAND/UL (ref 4.31–10.16)

## 2022-07-29 PROCEDURE — 99232 SBSQ HOSP IP/OBS MODERATE 35: CPT | Performed by: HOSPITALIST

## 2022-07-29 PROCEDURE — 85025 COMPLETE CBC W/AUTO DIFF WBC: CPT | Performed by: HOSPITALIST

## 2022-07-29 PROCEDURE — 80048 BASIC METABOLIC PNL TOTAL CA: CPT | Performed by: HOSPITALIST

## 2022-07-29 PROCEDURE — G0408 INPT/TELE FOLLOW UP 35: HCPCS | Performed by: INTERNAL MEDICINE

## 2022-07-29 PROCEDURE — 93010 ELECTROCARDIOGRAM REPORT: CPT | Performed by: INTERNAL MEDICINE

## 2022-07-29 PROCEDURE — 85610 PROTHROMBIN TIME: CPT | Performed by: HOSPITALIST

## 2022-07-29 RX ADMIN — CEFEPIME HYDROCHLORIDE 2000 MG: 2 INJECTION, SOLUTION INTRAVENOUS at 08:13

## 2022-07-29 RX ADMIN — FUROSEMIDE 40 MG: 40 TABLET ORAL at 08:13

## 2022-07-29 RX ADMIN — WARFARIN SODIUM 2 MG: 2 TABLET ORAL at 18:10

## 2022-07-29 RX ADMIN — CEFEPIME HYDROCHLORIDE 2000 MG: 2 INJECTION, SOLUTION INTRAVENOUS at 18:10

## 2022-07-29 RX ADMIN — LEVOTHYROXINE SODIUM 125 MCG: 25 TABLET ORAL at 05:19

## 2022-07-29 RX ADMIN — DULOXETINE 20 MG: 20 CAPSULE, DELAYED RELEASE ORAL at 08:13

## 2022-07-29 RX ADMIN — Medication 125 MG: at 21:04

## 2022-07-29 RX ADMIN — ALLOPURINOL 100 MG: 100 TABLET ORAL at 08:13

## 2022-07-29 RX ADMIN — Medication 125 MG: at 08:13

## 2022-07-29 RX ADMIN — TRIAMCINOLONE ACETONIDE: 1 CREAM TOPICAL at 08:14

## 2022-07-29 NOTE — PROGRESS NOTES
Progress Note - Infectious Disease   Joyce Burk 80 y o  female MRN: [id number]  Unit/Bed#: -01 Encounter: [id number]        REQUIRED DOCUMENTATION:     1  This service was provided via Telemedicine  2  Provider located at [facility]  3  TeleMed provider: Yvette Mckeon MD   4  Identify all parties in room with patient during tele consult:RN  5  After connecting through [employer], patient was identified by name and date of birth and assistant checked wristband  Patient was then informed that this was a Telemedicine visit and that the exam was being conducted confidentially over secure lines  My office door was closed  No one else was in the room  Patient acknowledged consent and understanding of privacy and security of the Telemedicine visit, and gave us permission to have the assistant stay in the room in order to assist with the history and to conduct the exam   I informed the patient that I have reviewed their record in Epic and presented the opportunity for them to ask any questions regarding the visit today  The patient agreed to participate  Assessment/Recommendations     1  Recurrent panniculitis/ left hip cellulitis   - Recurrence in the setting of morbid obesity, chronic lymphedema, increased moisture and superficial skin breakdown  - Previous wound cx with Pseudomonas, Proteus  - US and clinically without abscess  - afebrile without leukocytosis, improving     · Continue cefepime 2 q8h  · Continue wound care, minimize friction and moisture related skin breakdown and recommend barrier creams  · Recommend frequent offloading, repositioning  · Recommend weight loss   · Discussed natural history of cellulitis and discussed with patient that she will continue to have recurrent cellulitis if underlying risk factors cannot be modified  · Anticipate patient can be discharged on po levaquin 750 daily x 5 days through 8/1     2  Recurrent C difficile colitis      - No evidence of diarrhea, active colitis        · Continue p o  Vancomycin prophylaxis x 72 hours beyond systemic antibiotic      3  Morbid obesity  - Risk factor for recurrent infection and failure of oral antibiotics for cellulitis     4  Drug allergies  - Hives with penicillin, bactrim    Discussed with the primary service  History       Subjective: The patient has no complaints  Has noted persistence but overall improvement in left hip pain, redness and drainage  Denies fevers, chills, or sweats  Denies nausea, vomiting, or diarrhea      Antibiotics:  Cefepime, oral vanc      Physical Exam     Temp:  [97 8 °F (36 6 °C)-98 1 °F (36 7 °C)] 97 8 °F (36 6 °C)  HR:  [64-76] 71  Resp:  [16-20] 16  BP: ()/(52-62) 111/59  SpO2:  [92 %-96 %] 93 %  Temp (24hrs), Av °F (36 7 °C), Min:97 8 °F (36 6 °C), Max:98 1 °F (36 7 °C)  Current: Temperature: 97 8 °F (36 6 °C)    Intake/Output Summary (Last 24 hours) at 2022 7601  Last data filed at 2022 4553  Gross per 24 hour   Intake 570 ml   Output --   Net 570 ml       Physical exam findings reported by bedside and primary medical team staff      General Appearance:  Appearing chronically ill, nontoxic, and in no distress, appears stated age   Throat: Oropharynx moist without lesions; lips, mucosa, and tongue normal; teeth and gums normal   Lungs:   Clear to auscultation bilaterally, no audible wheezes, rhonchi and rales, respirations unlabored   Heart:  Regular rate and rhythm, S1, S2 normal, no murmur, rub or gallop   Abdomen:   Soft, non-tender, non-distended, positive bowel sounds, no masses, no organomegaly    No CVA tenderness   Extremities: Extremities normal, atraumatic, no cyanosis, clubbing or edema   Skin: L hip fold with erythema, drainage, no fluctuance   Neurologic: Alert and oriented times 3         Invasive Devices:   Peripheral IV 22 Right Antecubital (Active)   Site Assessment WDL 22 2136   Dressing Type Transparent 22 2100   Line Status Flushed;Saline locked 07/27/22 2100   Dressing Status Clean;Dry; Intact 07/27/22 2100   Reason Not Rotated Not due 07/27/22 1000       Labs, Imaging, & Other Studies     Lab Results:    I have personally reviewed pertinent labs  Results from last 7 days   Lab Units 07/29/22  0430 07/28/22  0444 07/27/22  0525   WBC Thousand/uL 9 20 9 46 8 82   HEMOGLOBIN g/dL 10 4* 10 6* 10 3*   PLATELETS Thousands/uL 409* 409* 400*     Results from last 7 days   Lab Units 07/29/22  0430 07/28/22  0444 07/27/22  0525 07/26/22  0550 07/25/22  2154   POTASSIUM mmol/L 3 7   < > 4 1   < > 4 9   CHLORIDE mmol/L 98   < > 98   < > 96   CO2 mmol/L 26   < > 30   < > 28   BUN mg/dL 33*   < > 28*   < > 28*   CREATININE mg/dL 1 24   < > 1 29   < > 1 20   EGFR ml/min/1 73sq m 44   < > 42   < > 45   CALCIUM mg/dL 8 8   < > 8 5   < > 8 8   AST U/L  --   --  22  --  27   ALT U/L  --   --  19  --  14   ALK PHOS U/L  --   --  116*  --  134*    < > = values in this interval not displayed  Imaging Studies:   I have personally reviewed pertinent imaging study reports and images in PACS  EKG, Pathology, and Other Studies:   I have personally reviewed pertinent reports  Counseling/Coordination of care: Total 35 minutes communication with the patient via telehealth  Labs, medical tests and imaging studies were independently reviewed by me as noted above

## 2022-07-29 NOTE — PLAN OF CARE
Problem: MOBILITY - ADULT  Goal: Maintain or return to baseline ADL function  Description: INTERVENTIONS:  -  Assess patient's ability to carry out ADLs; assess patient's baseline for ADL function and identify physical deficits which impact ability to perform ADLs (bathing, care of mouth/teeth, toileting, grooming, dressing, etc )  - Assess/evaluate cause of self-care deficits   - Assess range of motion  - Assess patient's mobility; develop plan if impaired  - Assess patient's need for assistive devices and provide as appropriate  - Encourage maximum independence but intervene and supervise when necessary  - Involve family in performance of ADLs  - Assess for home care needs following discharge   - Consider OT consult to assist with ADL evaluation and planning for discharge  - Provide patient education as appropriate  Outcome: Progressing  Goal: Maintains/Returns to pre admission functional level  Description: INTERVENTIONS:  - Perform BMAT or MOVE assessment daily    - Set and communicate daily mobility goal to care team and patient/family/caregiver  - Collaborate with rehabilitation services on mobility goals if consulted  - Perform Range of Motion 3 times a day  - Reposition patient every 2 hours    - Dangle patient 3 times a day  - Stand patient 3 times a day  - Ambulate patient 3 times a day  - Out of bed to chair 3 times a day   - Out of bed for meals 3 times a day  - Out of bed for toileting  - Record patient progress and toleration of activity level   Outcome: Progressing     Problem: Prexisting or High Potential for Compromised Skin Integrity  Goal: Skin integrity is maintained or improved  Description: INTERVENTIONS:  - Identify patients at risk for skin breakdown  - Assess and monitor skin integrity  - Assess and monitor nutrition and hydration status  - Monitor labs   - Assess for incontinence   - Turn and reposition patient  - Assist with mobility/ambulation  - Relieve pressure over bony prominences  - Avoid friction and shearing  - Provide appropriate hygiene as needed including keeping skin clean and dry  - Evaluate need for skin moisturizer/barrier cream  - Collaborate with interdisciplinary team   - Patient/family teaching  - Consider wound care consult   Outcome: Progressing     Problem: Potential for Falls  Goal: Patient will remain free of falls  Description: INTERVENTIONS:  - Educate patient/family on patient safety including physical limitations  - Instruct patient to call for assistance with activity   - Consult OT/PT to assist with strengthening/mobility   - Keep Call bell within reach  - Keep bed low and locked with side rails adjusted as appropriate  - Keep care items and personal belongings within reach  - Initiate and maintain comfort rounds  - Make Fall Risk Sign visible to staff  - Offer Toileting every 2 Hours, in advance of need  - Initiate/Maintain bed alarm  - Obtain necessary fall risk management equipment: bed alarm  - Apply yellow socks and bracelet for high fall risk patients  - Consider moving patient to room near nurses station  Outcome: Progressing

## 2022-07-29 NOTE — PROGRESS NOTES
[facility]  Progress Note - Josh Blanc 1950, 80 y o  female MRN: [id number]  Unit/Bed#: -01 Encounter: [id number]  Primary Care Provider: Lindajo Crigler, MD   Date and time admitted to hospital: 7/25/2022  8:32 PM    * Panniculitis  Assessment & Plan  · Appreciate ID input  · Continue cefepime 2 g IV q 12 hours while in-house, once case management is able to set up a safe discharge, patient okay to go home on oral Levaquin  · Abdominal ultrasound reviewed-no concern for abscesses  · Appreciate general surgical input-no acute phase intervention needed  · Medically stable for discharge  · Case reviewed with case management, they are working on discharge planning - patient may need to remain in-house until Tuesday August 2, 2022 at which time home healthcare provider will be able to resume services for the patient    In the interim, they are working short-term rehab options    History of Clostridioides difficile infection  Assessment & Plan  · Continue vancomycin 125 mg p o  q 12 hours for 72 hours beyond completion of the antibiotic course    Chronic diastolic congestive heart failure ([facility] [address] )  Assessment & Plan  Wt Readings from Last 3 Encounters:   07/25/22 (!) 201 kg (442 lb 7 4 oz)   06/14/22 (!) 211 kg (464 lb 8 2 oz)   05/08/22 (!) 212 kg (467 lb)     · Currently without exacerbation  · Continue Coumadin, continue Lasix  · INR is therapeutic        Chronic atrial fibrillation ([facility] )  Assessment & Plan  · Patient is rate controlled at this time  · Will continue pre-hospital Coumadin 2 mg alternating with 4 mg daily  · INR is therapeutic    Other specified hypothyroidism  Assessment & Plan  · Continue levothyroxine    Mild episode of recurrent major depressive disorder (HCC)  Assessment & Plan  · Continue Cymbalta    Class 3 severe obesity due to excess calories with serious comorbidity and body mass index (BMI) greater than or equal to 70 in adult [facility])  Assessment & Plan  · Actual BMI 80 93  · Dietary, weight loss, and lifestyle modification counseling was provided        VTE Prophylaxis:  Warfarin (Coumadin)-systemically anticoagulated    Patient Centered Rounds: I have performed bedside rounds with nursing staff today  Discussions with Specialists or Other Care Team Provider:  Infectious Disease, nursing, case management  Education and Discussions with Family / Patient:  Patient was brought up to par    Current Length of Stay: 3 day(s)    Current Patient Status: Inpatient   Certification Statement: The patient will continue to require additional inpatient hospital stay due to The need for placement    Discharge Plan:  Case management is working on discharge, patient is otherwise medically stable    Code Status: Level 1 - Full Code    Subjective:   Patient seen and examined, resting in bed, no new complaints today no distress    Objective:     Vitals:   Temp (24hrs), Av °F (36 7 °C), Min:97 8 °F (36 6 °C), Max:98 1 °F (36 7 °C)    Temp:  [97 8 °F (36 6 °C)-98 1 °F (36 7 °C)] 97 8 °F (36 6 °C)  HR:  [64-74] 71  Resp:  [16-20] 16  BP: ()/(52-59) 111/59  SpO2:  [92 %-96 %] 93 %  Body mass index is 78 38 kg/m²  Input and Output Summary (last 24 hours): Intake/Output Summary (Last 24 hours) at 2022 1301  Last data filed at 2022 6425  Gross per 24 hour   Intake 570 ml   Output --   Net 570 ml       Physical Exam:   Physical Exam  Constitutional:       General: She is not in acute distress  Appearance: Normal appearance  She is normal weight  She is not ill-appearing  HENT:      Head: Normocephalic and atraumatic  Nose: Nose normal       Mouth/Throat:      Mouth: Mucous membranes are moist    Eyes:      Extraocular Movements: Extraocular movements intact  Pupils: Pupils are equal, round, and reactive to light  Cardiovascular:      Rate and Rhythm: Normal rate and regular rhythm  Pulses: Normal pulses        Heart sounds: Normal heart sounds  No murmur heard  No friction rub  No gallop  Pulmonary:      Effort: Pulmonary effort is normal  No respiratory distress  Breath sounds: Normal breath sounds  No wheezing, rhonchi or rales  Abdominal:      General: There is no distension  Palpations: Abdomen is soft  There is no mass  Tenderness: There is no abdominal tenderness  Hernia: No hernia is present  Comments: Left anterior abdominal wall panniculitis   Musculoskeletal:         General: No swelling or tenderness  Normal range of motion  Cervical back: Normal range of motion and neck supple  No rigidity  Right lower leg: No edema  Left lower leg: No edema  Comments: Two to 3+ bilateral lower extremity chronic edema with venous stasis changes   Skin:     General: Skin is warm  Capillary Refill: Capillary refill takes less than 2 seconds  Findings: No erythema or rash  Neurological:      General: No focal deficit present  Mental Status: She is alert and oriented to person, place, and time  Mental status is at baseline  Cranial Nerves: No cranial nerve deficit  Motor: No weakness  Psychiatric:         Mood and Affect: Mood normal          Behavior: Behavior normal          Additional Data:     Labs:    Results from last 7 days   Lab Units 07/29/22  0430   WBC Thousand/uL 9 20   HEMOGLOBIN g/dL 10 4*   HEMATOCRIT % 35 1   PLATELETS Thousands/uL 409*   NEUTROS PCT % 78*   LYMPHS PCT % 10*   MONOS PCT % 7   EOS PCT % 4     Results from last 7 days   Lab Units 07/29/22  0430 07/28/22  0444 07/27/22  0525   SODIUM mmol/L 133*   < > 137   POTASSIUM mmol/L 3 7   < > 4 1   CHLORIDE mmol/L 98   < > 98   CO2 mmol/L 26   < > 30   BUN mg/dL 33*   < > 28*   CREATININE mg/dL 1 24   < > 1 29   CALCIUM mg/dL 8 8   < > 8 5   ALK PHOS U/L  --   --  116*   ALT U/L  --   --  19   AST U/L  --   --  22    < > = values in this interval not displayed       Results from last 7 days   Lab Units 07/29/22  0430   INR  2 19*               * I Have Reviewed All Lab Data Listed Above  * Additional Pertinent Lab Tests Reviewed: [address] Admission  Reviewed    Imaging:  Imaging Reports Reviewed Today Include:  None    Recent Cultures (last 7 days):           Last 24 Hours Medication List:   Current Facility-Administered Medications   Medication Dose Route Frequency Provider Last Rate    acetaminophen  650 mg Oral Q6H PRN Lorriane Sevin, PA-C      allopurinol  100 mg Oral Daily Lorriane Sevin, PA-C      cefepime  2,000 mg Intravenous Q8H Mateo Burton MD 2,000 mg (07/29/22 0813)    DULoxetine  20 mg Oral Daily Lorriane Sevin, PA-C      furosemide  40 mg Oral Daily Lorriane Sevin, PA-C      levothyroxine  125 mcg Oral Early Morning Lorriane Sevin, PA-C      ondansetron  4 mg Intravenous Q6H PRN Lorriane Sevin, PA-C      oxyCODONE  5 mg Oral Q6H PRN Lorriane Sevin, PA-C      triamcinolone   Topical BID Mateo Burton MD      vancomycin  125 mg Oral Q12H [address] [facility], PA-C      warfarin  2 mg Oral Once per day on Sun Mon Wed Fri Lorriane Sevin, PA-C      warfarin  4 mg Oral Once per day on Tue Thu Sat Lorribasilio Andrea, PA-C          Today, Patient Was Seen By: Mateo Burton MD    ** Please Note: Dictation voice to text software may have been used in the creation of this document   **

## 2022-07-29 NOTE — ASSESSMENT & PLAN NOTE
· Appreciate ID input  · Continue cefepime 2 g IV q 12 hours while in-house, once case management is able to set up a safe discharge, patient okay to go home on oral Levaquin  · Abdominal ultrasound reviewed-no concern for abscesses  · Appreciate general surgical input-no acute phase intervention needed  · Medically stable for discharge  · Case reviewed with case management, they are working on discharge planning - patient may need to remain in-house until Tuesday August 2, 2022 at which time home healthcare provider will be able to resume services for the patient    In the interim, they are working short-term rehab options

## 2022-07-29 NOTE — CASE MANAGEMENT
Case Management Discharge Planning Note    Patient name Anyi Kirk  Location [address] 224/-44 MRN [id number]  : 1950 Date 2022       Current Admission Date: 2022  Current Admission Diagnosis:Panniculitis   Patient Active Problem List    Diagnosis Date Noted    BMI 70 and over, adult ([place] [address] ) 2022    Chronic atrial fibrillation ([place] [address] ) 2022    History of Clostridioides difficile infection 2022    Lymphedema of extremity 2021    Other specified hypothyroidism 10/03/2020    Mild episode of recurrent major depressive disorder ([place] [address] ) 10/03/2020    Idiopathic chronic 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 10/03/2020    Primary osteoarthritis involving multiple joints 10/03/2020    Class 3 severe obesity due to excess calories with serious comorbidity and body mass index (BMI) greater than or equal to 70 in adult [facility]) 10/03/2020    Chronic diastolic congestive heart failure ([facility] ) 10/03/2020    Panniculitis 10/03/2020    Gastroesophageal reflux disease without esophagitis 10/03/2020    Hx MRSA infection 2020    Functional gait abnormality 2019    Impaired mobility 2019    Osteoarthritis of glenohumeral joint 2019    Left ovarian cyst 2017    Depression with anxiety 07/15/2017    Anemia 2016    Ambulatory dysfunction 2016    Adjustment disorder 2013    Irritable bowel syndrome 2012    Left atrial enlargement 2012    Left ventricular hypertrophy 2012    Carpal tunnel syndrome 2012    Gout 2012    Hyperlipidemia 2012    Osteoarthritis 2012    Symptomatic menopausal or female climacteric states 2012      LOS (days): 3  Geometric Mean LOS (GMLOS) (days): 2 90  Days to GMLOS:-0 8     OBJECTIVE:  Risk of Unplanned Readmission Score: 38 6         Current admission status: Inpatient   Preferred Pharmacy:    [address]  [address]  [place] [address] [facility]  Phone: [phone] Fax: [phone]    [address] [place], [place] - [address]  [address]  [facility] [address]  Phone: [phone] Fax: [phone]    Primary Care Provider: Sarah Yarbrough MD    Primary Insurance: MEDICARE  Secondary Insurance: AARP    DISCHARGE DETAILS:    Discharge planning discussed with[de-identified] patient and niece was called 13:41 pm & 14:09  message was left at [phone]  Freedom of Choice: Yes  Comments - Freedom of Choice: pt is active with [facility] caregivers  12 hrs per day                               Other Referral/Resources/Interventions Provided:  Interventions: Short Term Rehab, [address], Other (Specify) ([facility] caregivers)  Referral Comments: cm placed a call to [facility] at [phone] at 10:53am  and Jeny Puentes stated she has not been able to fins any additonal help,  the pt';s care giver can return 8/2/2022 if she has a negative covid test   cm did send a referral to [facility] to see if they are able to accept for wound care while she waits for her caregiver to return, pt is active with [address] [place] [address]    Would you like to participate in our [address] service program?  : No - Declined    Treatment Team Recommendation: Home with [address] [facility] (home with [address] [facility] and [address] from [place] [address])

## 2022-07-29 NOTE — WOUND OSTOMY CARE
Progress Note - Wound   Ambika Plata 80 y o  female MRN: [id number]  Unit/Bed#: -01 Encounter: [id number]      Assessment:   Per patient she feels the mid abdominal wound is not improving with Kenalog cream  Cleansed abdominal fold with NSS, applied Tirad paste to wound bed and topped with Allevyn life silicone bordered dressing  Skin and Wound Care Plan:   1  Apply Hydraguard to b/l heels, buttocks and sacrum BID and PRN  2  Apply skin nourishing cream to the skin daily  3  Turn and reposition patient Q2 hours  4  Cleanse abdominal fold, thigh folds, knee folds and left lateral thigh with soap and water daily, pat dry  Apply Kenalog cream to areas of erythema to the folds, place 4X4's in abdominal fold, knee and thigh folds  Left lateral thigh place adult brief over area of erythema, change PRN with drainage  Mid abdominal fold open wound bed, apply dime thickness of Triad paste and cover with Allevyn life silicone bordered foam dressing, dinorah with T, change daily and PRN      Wound:  Wound 07/27/22 MASD Hip Anterior (Active)   Wound Image   07/27/22 1430   Wound Description Beefy red;Yellow 07/28/22 2129   Alison-wound Assessment Erythema;Fragile 07/28/22 2129   Wound Length (cm) 2 5 cm 07/27/22 1430   Wound Width (cm) 3 5 cm 07/27/22 1430   Wound Depth (cm) 0 1 cm 07/27/22 1430   Wound Surface Area (cm^2) 8 75 cm^2 07/27/22 1430   Wound Volume (cm^3) 0 875 cm^3 07/27/22 1430   Calculated Wound Volume (cm^3) 0 88 cm^3 07/27/22 1430   Tunneling 0 cm 07/27/22 1430   Drainage Amount Scant 07/27/22 2042   Drainage Description Yellow 07/27/22 2042   Non-staged Wound Description Partial thickness 07/27/22 2042   Treatments Cleansed;Site care 07/28/22 2129   Dressing Gauze 07/27/22 2042   Wound packed?  No 07/27/22 1430   Dressing Changed Changed 07/27/22 2042   Patient Tolerance Tolerated well 07/27/22 2042       Wound 07/27/22 Other (comment) Cellulitis Thigh Left;Proximal;Lateral (Active)   Wound Description Beefy red;Edema 07/28/22 2129   Alison-wound Assessment Erythema;Fragile 07/28/22 2129   Drainage Amount Small 07/27/22 2042   Drainage Description Yellow 07/27/22 2042   Treatments Cleansed;Site care 07/27/22 2042   Dressing Other (Comment) 07/27/22 1430   Dressing Changed Changed 07/27/22 2042   Patient Tolerance Tolerated well 07/27/22 2042     Reviewed plan of care with primary RN Elisabeth  Recommendations written as orders  Wound care team to follow weekly while admitted  Questions or concerns [address] [facility] Nurse    Odette ELIZONDON, RN, [facility]

## 2022-07-29 NOTE — ASSESSMENT & PLAN NOTE
· Continue vancomycin 125 mg p o  q 12 hours for 72 hours beyond completion of the antibiotic course

## 2022-07-30 PROCEDURE — 99232 SBSQ HOSP IP/OBS MODERATE 35: CPT | Performed by: HOSPITALIST

## 2022-07-30 RX ADMIN — CEFEPIME HYDROCHLORIDE 2000 MG: 2 INJECTION, SOLUTION INTRAVENOUS at 15:35

## 2022-07-30 RX ADMIN — CEFEPIME HYDROCHLORIDE 2000 MG: 2 INJECTION, SOLUTION INTRAVENOUS at 08:47

## 2022-07-30 RX ADMIN — FUROSEMIDE 40 MG: 40 TABLET ORAL at 08:47

## 2022-07-30 RX ADMIN — DULOXETINE 20 MG: 20 CAPSULE, DELAYED RELEASE ORAL at 08:47

## 2022-07-30 RX ADMIN — TRIAMCINOLONE ACETONIDE: 1 CREAM TOPICAL at 17:23

## 2022-07-30 RX ADMIN — CEFEPIME HYDROCHLORIDE 2000 MG: 2 INJECTION, SOLUTION INTRAVENOUS at 00:19

## 2022-07-30 RX ADMIN — WARFARIN SODIUM 4 MG: 4 TABLET ORAL at 17:22

## 2022-07-30 RX ADMIN — TRIAMCINOLONE ACETONIDE: 1 CREAM TOPICAL at 09:39

## 2022-07-30 RX ADMIN — LEVOTHYROXINE SODIUM 125 MCG: 25 TABLET ORAL at 05:04

## 2022-07-30 RX ADMIN — ALLOPURINOL 100 MG: 100 TABLET ORAL at 08:47

## 2022-07-30 RX ADMIN — Medication 125 MG: at 21:50

## 2022-07-30 RX ADMIN — CEFEPIME HYDROCHLORIDE 2000 MG: 2 INJECTION, SOLUTION INTRAVENOUS at 22:26

## 2022-07-30 RX ADMIN — Medication 125 MG: at 09:39

## 2022-07-30 NOTE — PROGRESS NOTES
[facility]  Progress Note - Marge Arechiga 1950, 80 y o  female MRN: [id number]  Unit/Bed#: -01 Encounter: [id number]  Primary Care Provider: Reese Villalobos MD   Date and time admitted to hospital: 7/25/2022  8:32 PM    * Panniculitis  Assessment & Plan  · Appreciate ID input  · Continue cefepime 2 g IV q 12 hours through Monday 08/01/2022, after which antibiotics can be discontinued  · Abdominal ultrasound reviewed-no concern for abscesses  · Appreciate general surgical input-no acute phase intervention needed  · Medically stable for discharge  · Case reviewed with case management, they are working on discharge planning - patient may need to remain in-house until Tuesday August 2, 2022 at which time home healthcare provider will be able to resume services for the patient    In the interim, they are working short-term rehab options    History of Clostridioides difficile infection  Assessment & Plan  · Continue vancomycin 125 mg p o  q 12 hours for 72 hours beyond completion of the antibiotic course    Chronic diastolic congestive heart failure (HCC)  Assessment & Plan  Wt Readings from Last 3 Encounters:   07/25/22 (!) 201 kg (442 lb 7 4 oz)   06/14/22 (!) 211 kg (464 lb 8 2 oz)   05/08/22 (!) 212 kg (467 lb)     · Currently without exacerbation  · Continue Coumadin, continue Lasix  · INR is therapeutic        Chronic atrial fibrillation ([facility] [address] )  Assessment & Plan  · Patient is rate controlled at this time  · Will continue pre-hospital Coumadin 2 mg alternating with 4 mg daily  · INR is therapeutic    Other specified hypothyroidism  Assessment & Plan  · Continue levothyroxine    Mild episode of recurrent major depressive disorder (HCC)  Assessment & Plan  · Continue Cymbalta    Class 3 severe obesity due to excess calories with serious comorbidity and body mass index (BMI) greater than or equal to 70 in adult [facility])  Assessment & Plan  · Actual BMI 80 93  · Dietary, weight loss, and lifestyle modification counseling was provided        VTE Prophylaxis:  Warfarin (Coumadin) - systemically anticoagulated    Patient Centered Rounds: I have performed bedside rounds with nursing staff today  Discussions with Specialists or Other Care Team Provider:  General surgery, case management, nursing  Education and Discussions with Family / Patient:  Patient was brought up to par    Current Length of Stay: 4 day(s)    Current Patient Status: Inpatient   Certification Statement: The patient will continue to require additional inpatient hospital stay due to The need for a safe disposition plan as per case management    Discharge Plan:  Patient has been medically stable for discharge for the past couple of days, case management is trying to ensure that the patient has a safe post discharge Plan especially more so since her caregiver is unavailable at this time    Code Status: Level 1 - Full Code    Subjective:   Patient seen, resting in bed, no complaints, no distress    Objective:     Vitals:   Temp (24hrs), Av 8 °F (36 6 °C), Min:97 5 °F (36 4 °C), Max:98 °F (36 7 °C)    Temp:  [97 5 °F (36 4 °C)-98 °F (36 7 °C)] 97 5 °F (36 4 °C)  HR:  [64-83] 64  Resp:  [17-20] 17  BP: ()/(58-63) 106/63  SpO2:  [92 %-94 %] 94 %  Body mass index is 78 38 kg/m²  Input and Output Summary (last 24 hours): Intake/Output Summary (Last 24 hours) at 2022 1200  Last data filed at 2022 0836  Gross per 24 hour   Intake 870 ml   Output --   Net 870 ml       Physical Exam:   Physical Exam  Constitutional:       General: She is not in acute distress  Appearance: Normal appearance  She is normal weight  She is not ill-appearing  HENT:      Head: Normocephalic and atraumatic  Nose: Nose normal       Mouth/Throat:      Mouth: Mucous membranes are moist    Eyes:      Extraocular Movements: Extraocular movements intact  Pupils: Pupils are equal, round, and reactive to light  Cardiovascular:      Rate and Rhythm: Normal rate and regular rhythm  Pulses: Normal pulses  Heart sounds: Normal heart sounds  No murmur heard  No friction rub  No gallop  Pulmonary:      Effort: Pulmonary effort is normal  No respiratory distress  Breath sounds: Normal breath sounds  No wheezing, rhonchi or rales  Abdominal:      General: There is no distension  Palpations: Abdomen is soft  There is no mass  Tenderness: There is no abdominal tenderness  Hernia: No hernia is present  Comments: Anterior abdominal wall panniculitis bilateral, left greater than right   Musculoskeletal:         General: No swelling or tenderness  Normal range of motion  Cervical back: Normal range of motion and neck supple  No rigidity  Right lower leg: No edema  Left lower leg: No edema  Skin:     General: Skin is warm  Capillary Refill: Capillary refill takes less than 2 seconds  Findings: No erythema or rash  Neurological:      General: No focal deficit present  Mental Status: She is alert and oriented to person, place, and time  Mental status is at baseline  Cranial Nerves: No cranial nerve deficit  Motor: No weakness     Psychiatric:         Mood and Affect: Mood normal          Behavior: Behavior normal          Additional Data:     Labs:    Results from last 7 days   Lab Units 07/29/22  0430   WBC Thousand/uL 9 20   HEMOGLOBIN g/dL 10 4*   HEMATOCRIT % 35 1   PLATELETS Thousands/uL 409*   NEUTROS PCT % 78*   LYMPHS PCT % 10*   MONOS PCT % 7   EOS PCT % 4     Results from last 7 days   Lab Units 07/29/22  0430 07/28/22  0444 07/27/22  0525   SODIUM mmol/L 133*   < > 137   POTASSIUM mmol/L 3 7   < > 4 1   CHLORIDE mmol/L 98   < > 98   CO2 mmol/L 26   < > 30   BUN mg/dL 33*   < > 28*   CREATININE mg/dL 1 24   < > 1 29   CALCIUM mg/dL 8 8   < > 8 5   ALK PHOS U/L  --   --  116*   ALT U/L  --   --  19   AST U/L  --   --  22    < > = values in this interval not displayed  Results from last 7 days   Lab Units 07/29/22  0430   INR  2 19*               * I Have Reviewed All Lab Data Listed Above  * Additional Pertinent Lab Tests Reviewed: [address] Admission  Reviewed    Imaging:  Imaging Reports Reviewed Today Include:  None    Recent Cultures (last 7 days):           Last 24 Hours Medication List:   Current Facility-Administered Medications   Medication Dose Route Frequency Provider Last Rate    acetaminophen  650 mg Oral Q6H PRN Nanci Meng PA-C      allopurinol  100 mg Oral Daily Nanci Meng PA-C      cefepime  2,000 mg Intravenous Q8H Isadora Gerardo MD 2,000 mg (07/30/22 0847)    DULoxetine  20 mg Oral Daily Nacni Meng PA-C      furosemide  40 mg Oral Daily Nanci Meng PA-C      levothyroxine  125 mcg Oral Early Morning Nanci Meng PA-C      ondansetron  4 mg Intravenous Q6H PRN Nanci Meng PA-C      oxyCODONE  5 mg Oral Q6H PRN Nanci Meng PA-C      triamcinolone   Topical BID Isadora Gerardo MD      vancomycin  [facility] Q12H [address] [facility]      warfarin  2 mg Oral Once per day on Sun Mon Wed Fri Nanci Meng PA-C      warfarin  4 mg Oral Once per day on Tue Thu Sat Nanci Meng PA-C          Today, Patient Was Seen By: Isadora Gerardo MD    ** Please Note: Dictation voice to text software may have been used in the creation of this document   **

## 2022-07-30 NOTE — ASSESSMENT & PLAN NOTE
· Appreciate ID input  · Continue cefepime 2 g IV q 12 hours through Monday 08/01/2022, after which antibiotics can be discontinued  · Abdominal ultrasound reviewed-no concern for abscesses  · Appreciate general surgical input-no acute phase intervention needed  · Medically stable for discharge  · Case reviewed with case management, they are working on discharge planning - patient may need to remain in-house until Tuesday August 2, 2022 at which time home healthcare provider will be able to resume services for the patient    In the interim, they are working short-term rehab options

## 2022-07-31 PROCEDURE — 99232 SBSQ HOSP IP/OBS MODERATE 35: CPT | Performed by: HOSPITALIST

## 2022-07-31 RX ORDER — LEVOFLOXACIN 750 MG/1
750 TABLET ORAL EVERY 24 HOURS
Status: COMPLETED | OUTPATIENT
Start: 2022-07-31 | End: 2022-08-02

## 2022-07-31 RX ADMIN — LEVOFLOXACIN 750 MG: 750 TABLET, FILM COATED ORAL at 14:32

## 2022-07-31 RX ADMIN — TRIAMCINOLONE ACETONIDE: 1 CREAM TOPICAL at 17:39

## 2022-07-31 RX ADMIN — ALLOPURINOL 100 MG: 100 TABLET ORAL at 09:03

## 2022-07-31 RX ADMIN — Medication 125 MG: at 21:23

## 2022-07-31 RX ADMIN — CEFEPIME HYDROCHLORIDE 2000 MG: 2 INJECTION, SOLUTION INTRAVENOUS at 09:03

## 2022-07-31 RX ADMIN — WARFARIN SODIUM 2 MG: 2 TABLET ORAL at 17:01

## 2022-07-31 RX ADMIN — DULOXETINE 20 MG: 20 CAPSULE, DELAYED RELEASE ORAL at 09:02

## 2022-07-31 RX ADMIN — FUROSEMIDE 40 MG: 40 TABLET ORAL at 09:02

## 2022-07-31 RX ADMIN — TRIAMCINOLONE ACETONIDE: 1 CREAM TOPICAL at 09:03

## 2022-07-31 RX ADMIN — Medication 125 MG: at 11:11

## 2022-07-31 RX ADMIN — LEVOTHYROXINE SODIUM 125 MCG: 25 TABLET ORAL at 05:13

## 2022-07-31 NOTE — ASSESSMENT & PLAN NOTE
Wt Readings from Last 3 Encounters:   07/25/22 (!) 201 kg (442 lb 7 4 oz)   06/14/22 (!) 211 kg (464 lb 8 2 oz)   05/08/22 (!) 212 kg (467 lb)     · Currently without exacerbation  · Continue Coumadin, continue Lasix  · INR is therapeutic  · Repeat blood work in the a m

## 2022-07-31 NOTE — PROGRESS NOTES
[facility]  Progress Note - Miki Garza 1950, 80 y o  female MRN: [id number]  Unit/Bed#: -[facility] Encounter: [id number]  Primary Care Provider: Glendy Renae MD   Date and time admitted to hospital: 7/25/2022  8:32 PM    * Panniculitis  Assessment & Plan  · Appreciate ID input  · Status post 7 days worth of IV cefepime-patient lost her IV access today, and does not want to have another IV placed  · Okay for transition to Levaquin x3 more days  · Abdominal ultrasound reviewed-no concern for abscesses  · Appreciate general surgical input-no acute phase intervention needed  · Medically stable for discharge  · Case reviewed with case management, they are working on discharge planning - patient may need to remain in-house until Tuesday August 2, 2022 at which time the patient's home healthcare provider will be able to resume services for the patient  In the interim, they are working short-term rehab options    History of Clostridioides difficile infection  Assessment & Plan  · Continue vancomycin 125 mg p o  q 12 hours for 72 hours beyond completion of the antibiotic course    Chronic diastolic congestive heart failure (HCC)  Assessment & Plan  Wt Readings from Last 3 Encounters:   07/25/22 (!) 201 kg (442 lb 7 4 oz)   06/14/22 (!) 211 kg (464 lb 8 2 oz)   05/08/22 (!) 212 kg (467 lb)     · Currently without exacerbation  · Continue Coumadin, continue Lasix  · INR is therapeutic  · Repeat blood work in the a m          Chronic atrial fibrillation (HCC)  Assessment & Plan  · Patient is rate controlled at this time  · Will continue pre-hospital Coumadin 2 mg alternating with 4 mg daily  · INR is therapeutic    Other specified hypothyroidism  Assessment & Plan  · Continue levothyroxine    Mild episode of recurrent major depressive disorder (HCC)  Assessment & Plan  · Continue Cymbalta    Class 3 severe obesity due to excess calories with serious comorbidity and body mass index (BMI) greater than or equal to 70 in adult [facility])  Assessment & Plan  · Actual BMI 80 93  · Dietary, weight loss, and lifestyle modification counseling was provided        VTE Prophylaxis:  Warfarin (Coumadin) - systemically anticoagulated    Patient Centered Rounds: I have performed bedside rounds with nursing staff today  Discussions with Specialists or Other Care Team Provider:  Nursing  Education and Discussions with Family / Patient:  Patient was brought up to par    Current Length of Stay: 5 day(s)    Current Patient Status: Inpatient   Certification Statement: The patient will continue to require additional inpatient hospital stay due to The need for case management to secure safe disposition at time of discharge    Discharge Plan:  Patient has been medically stable for discharge for few days, case management is working on a safe disposition    Code Status: Level 1 - Full Code    Subjective:   Patient seen and examined, resting in bed, no new complaints today    Objective:     Vitals:   Temp (24hrs), Av 9 °F (36 6 °C), Min:97 7 °F (36 5 °C), Max:98 1 °F (36 7 °C)    Temp:  [97 7 °F (36 5 °C)-98 1 °F (36 7 °C)] 97 7 °F (36 5 °C)  HR:  [59-74] 59  Resp:  [18-20] 20  BP: (105-117)/(49-65) 105/49  SpO2:  [96 %-98 %] 96 %  Body mass index is 78 38 kg/m²  Input and Output Summary (last 24 hours): Intake/Output Summary (Last 24 hours) at 2022 1310  Last data filed at 2022 1733  Gross per 24 hour   Intake 125 ml   Output --   Net 125 ml       Physical Exam:   Physical Exam  Constitutional:       General: She is not in acute distress  Appearance: Normal appearance  She is normal weight  She is not ill-appearing  HENT:      Head: Normocephalic and atraumatic  Nose: Nose normal       Mouth/Throat:      Mouth: Mucous membranes are moist    Eyes:      Extraocular Movements: Extraocular movements intact  Pupils: Pupils are equal, round, and reactive to light  Cardiovascular:      Rate and Rhythm: Normal rate and regular rhythm  Pulses: Normal pulses  Heart sounds: Normal heart sounds  No murmur heard  No friction rub  No gallop  Pulmonary:      Effort: Pulmonary effort is normal  No respiratory distress  Breath sounds: Normal breath sounds  No wheezing, rhonchi or rales  Abdominal:      General: There is no distension  Palpations: Abdomen is soft  There is no mass  Tenderness: There is no abdominal tenderness  Hernia: No hernia is present  Comments: Bilateral anterior abdominal wall panniculitis, left greater than right   Musculoskeletal:         General: No swelling or tenderness  Normal range of motion  Cervical back: Normal range of motion and neck supple  No rigidity  Right lower leg: No edema  Left lower leg: No edema  Skin:     General: Skin is warm  Capillary Refill: Capillary refill takes less than 2 seconds  Findings: No erythema or rash  Neurological:      General: No focal deficit present  Mental Status: She is alert and oriented to person, place, and time  Mental status is at baseline  Cranial Nerves: No cranial nerve deficit  Motor: No weakness     Psychiatric:         Mood and Affect: Mood normal          Behavior: Behavior normal          Additional Data:     Labs:    Results from last 7 days   Lab Units 07/29/22  0430   WBC Thousand/uL 9 20   HEMOGLOBIN g/dL 10 4*   HEMATOCRIT % 35 1   PLATELETS Thousands/uL 409*   NEUTROS PCT % 78*   LYMPHS PCT % 10*   MONOS PCT % 7   EOS PCT % 4     Results from last 7 days   Lab Units 07/29/22  0430 07/28/22  0444 07/27/22  0525   SODIUM mmol/L 133*   < > 137   POTASSIUM mmol/L 3 7   < > 4 1   CHLORIDE mmol/L 98   < > 98   CO2 mmol/L 26   < > 30   BUN mg/dL 33*   < > 28*   CREATININE mg/dL 1 24   < > 1 29   CALCIUM mg/dL 8 8   < > 8 5   ALK PHOS U/L  --   --  116*   ALT U/L  --   --  19   AST U/L  --   --  22    < > = values in this interval not displayed  Results from last 7 days   Lab Units 07/29/22  0430   INR  2 19*               * I Have Reviewed All Lab Data Listed Above  * Additional Pertinent Lab Tests Reviewed: [address] Admission  Reviewed    Imaging:  Imaging Reports Reviewed Today Include:  None    Recent Cultures (last 7 days):           Last 24 Hours Medication List:   Current Facility-Administered Medications   Medication Dose Route Frequency Provider Last Rate    acetaminophen  650 mg Oral Q6H PRN Olga Ee, PA-C      allopurinol  100 mg Oral Daily Olga Ee, PA-C      DULoxetine  20 mg Oral Daily Olga Ee, PA-C      furosemide  40 mg Oral Daily Olga Ee, PA-C      levofloxacin  750 mg Oral Q24H Vero Millan MD      levothyroxine  125 mcg Oral Early Morning Olga Ee, PA-C      ondansetron  4 mg Intravenous Q6H PRN Olga Ee, PA-C      oxyCODONE  5 mg Oral Q6H PRN Olga Ee, PA-C      triamcinolone   Topical BID Vero Millan MD      vancomycin  125 mg Oral Q12H [address] [facility], PA-C      warfarin  2 mg Oral Once per day on Sun Mon Wed Fri Olga Ee, PA-C      warfarin  4 mg Oral Once per day on Tue Thu Sat Olga Ee, PA-C          Today, Patient Was Seen By: Vero Millan MD    ** Please Note: Dictation voice to text software may have been used in the creation of this document   **

## 2022-07-31 NOTE — ASSESSMENT & PLAN NOTE
· Appreciate ID input  · Status post 7 days worth of IV cefepime-patient lost her IV access today, and does not want to have another IV placed  · Okay for transition to Levaquin x3 more days  · Abdominal ultrasound reviewed-no concern for abscesses  · Appreciate general surgical input-no acute phase intervention needed  · Medically stable for discharge  · Case reviewed with case management, they are working on discharge planning - patient may need to remain in-house until Tuesday August 2, 2022 at which time the patient's home healthcare provider will be able to resume services for the patient    In the interim, they are working short-term rehab options

## 2022-08-01 LAB
ANION GAP SERPL CALCULATED.3IONS-SCNC: 8 MMOL/L (ref 4–13)
BASOPHILS # BLD AUTO: 0.05 THOUSANDS/ΜL (ref 0–0.1)
BASOPHILS NFR BLD AUTO: 1 % (ref 0–1)
BUN SERPL-MCNC: 39 MG/DL (ref 5–25)
CALCIUM SERPL-MCNC: 9 MG/DL (ref 8.4–10.2)
CHLORIDE SERPL-SCNC: 101 MMOL/L (ref 96–108)
CO2 SERPL-SCNC: 25 MMOL/L (ref 21–32)
CREAT SERPL-MCNC: 1.17 MG/DL (ref 0.6–1.3)
EOSINOPHIL # BLD AUTO: 0.33 THOUSAND/ΜL (ref 0–0.61)
EOSINOPHIL NFR BLD AUTO: 3 % (ref 0–6)
ERYTHROCYTE [DISTWIDTH] IN BLOOD BY AUTOMATED COUNT: 18.1 % (ref 11.6–15.1)
GFR SERPL CREATININE-BSD FRML MDRD: 47 ML/MIN/1.73SQ M
GLUCOSE SERPL-MCNC: 100 MG/DL (ref 65–140)
HCT VFR BLD AUTO: 34.6 % (ref 34.8–46.1)
HGB BLD-MCNC: 10.1 G/DL (ref 11.5–15.4)
IMM GRANULOCYTES # BLD AUTO: 0.05 THOUSAND/UL (ref 0–0.2)
IMM GRANULOCYTES NFR BLD AUTO: 1 % (ref 0–2)
INR PPP: 2.46 (ref 0.84–1.19)
LYMPHOCYTES # BLD AUTO: 1.17 THOUSANDS/ΜL (ref 0.6–4.47)
LYMPHOCYTES NFR BLD AUTO: 11 % (ref 14–44)
MCH RBC QN AUTO: 24.5 PG (ref 26.8–34.3)
MCHC RBC AUTO-ENTMCNC: 29.2 G/DL (ref 31.4–37.4)
MCV RBC AUTO: 84 FL (ref 82–98)
MONOCYTES # BLD AUTO: 0.69 THOUSAND/ΜL (ref 0.17–1.22)
MONOCYTES NFR BLD AUTO: 7 % (ref 4–12)
NEUTROPHILS # BLD AUTO: 8.06 THOUSANDS/ΜL (ref 1.85–7.62)
NEUTS SEG NFR BLD AUTO: 77 % (ref 43–75)
NRBC BLD AUTO-RTO: 0 /100 WBCS
PLATELET # BLD AUTO: 404 THOUSANDS/UL (ref 149–390)
PMV BLD AUTO: 8.6 FL (ref 8.9–12.7)
POTASSIUM SERPL-SCNC: 4 MMOL/L (ref 3.5–5.3)
PROTHROMBIN TIME: 26.5 SECONDS (ref 11.6–14.5)
RBC # BLD AUTO: 4.12 MILLION/UL (ref 3.81–5.12)
SODIUM SERPL-SCNC: 134 MMOL/L (ref 135–147)
WBC # BLD AUTO: 10.35 THOUSAND/UL (ref 4.31–10.16)

## 2022-08-01 PROCEDURE — 85025 COMPLETE CBC W/AUTO DIFF WBC: CPT | Performed by: HOSPITALIST

## 2022-08-01 PROCEDURE — 99232 SBSQ HOSP IP/OBS MODERATE 35: CPT | Performed by: INTERNAL MEDICINE

## 2022-08-01 PROCEDURE — 80048 BASIC METABOLIC PNL TOTAL CA: CPT | Performed by: HOSPITALIST

## 2022-08-01 PROCEDURE — 85610 PROTHROMBIN TIME: CPT | Performed by: HOSPITALIST

## 2022-08-01 RX ADMIN — WARFARIN SODIUM 2 MG: 2 TABLET ORAL at 16:46

## 2022-08-01 RX ADMIN — DULOXETINE 20 MG: 20 CAPSULE, DELAYED RELEASE ORAL at 08:25

## 2022-08-01 RX ADMIN — LEVOTHYROXINE SODIUM 125 MCG: 25 TABLET ORAL at 05:03

## 2022-08-01 RX ADMIN — Medication 125 MG: at 20:45

## 2022-08-01 RX ADMIN — LEVOFLOXACIN 750 MG: 750 TABLET, FILM COATED ORAL at 13:48

## 2022-08-01 RX ADMIN — TRIAMCINOLONE ACETONIDE: 1 CREAM TOPICAL at 17:05

## 2022-08-01 RX ADMIN — Medication 125 MG: at 08:26

## 2022-08-01 RX ADMIN — ALLOPURINOL 100 MG: 100 TABLET ORAL at 08:25

## 2022-08-01 RX ADMIN — TRIAMCINOLONE ACETONIDE: 1 CREAM TOPICAL at 08:25

## 2022-08-01 NOTE — ASSESSMENT & PLAN NOTE
Wt Readings from Last 3 Encounters:   07/25/22 (!) 201 kg (442 lb 7 4 oz)   06/14/22 (!) 211 kg (464 lb 8 2 oz)   05/08/22 (!) 212 kg (467 lb)     · Currently without exacerbation  · Continue Coumadin, continue Lasix  · INR is therapeutic  · Will continue to monitor I&Os daily weight

## 2022-08-01 NOTE — CASE MANAGEMENT
Case Management Discharge Planning Note    Patient name Dhiraj Guthrie  Location [address] 224/-32 MRN [id number]  : 1950 Date 2022       Current Admission Date: 2022  Current Admission Diagnosis:Panniculitis   Patient Active Problem List    Diagnosis Date Noted    BMI 70 and over, adult ([facility] ) 2022    Chronic atrial fibrillation ([facility] ) 2022    History of Clostridioides difficile infection 2022    Lymphedema of extremity 2021    Other specified hypothyroidism 10/03/2020    Mild episode of recurrent major depressive disorder ([facility] ) 10/03/2020    Idiopathic chronic 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 10/03/2020    Primary osteoarthritis involving multiple joints 10/03/2020    Class 3 severe obesity due to excess calories with serious comorbidity and body mass index (BMI) greater than or equal to 70 in adult [facility]) 10/03/2020    Chronic diastolic congestive heart failure ([facility] ) 10/03/2020    Panniculitis 10/03/2020    Gastroesophageal reflux disease without esophagitis 10/03/2020    Hx MRSA infection 2020    Functional gait abnormality 2019    Impaired mobility 2019    Osteoarthritis of glenohumeral joint 2019    Left ovarian cyst 2017    Depression with anxiety 07/15/2017    Anemia 2016    Ambulatory dysfunction 2016    Adjustment disorder 2013    Irritable bowel syndrome 2012    Left atrial enlargement 2012    Left ventricular hypertrophy 2012    Carpal tunnel syndrome 2012    Gout 2012    Hyperlipidemia 2012    Osteoarthritis 2012    Symptomatic menopausal or female climacteric states 2012      LOS (days): 6  Geometric Mean LOS (GMLOS) (days): 2 90  Days to GMLOS:-3 5     OBJECTIVE:  Risk of Unplanned Readmission Score: 39 72         Current admission status: Inpatient   Preferred Pharmacy:    [address] [place]  [address]  [place] [address] [facility]  Phone: [phone] Fax: [phone]    [facility] [place], [place] - [facility]  [address]  [place] [address]  Phone: [phone] Fax: [phone]    Primary Care Provider: Jenny Weeks MD    Primary Insurance: MEDICARE  Secondary Insurance: AARP    DISCHARGE DETAILS:    Discharge planning discussed with[de-identified] patient and niece Curt Alan was called at 09:22 am  Anh Babcock was called at 8:07am and I reached the on call team, cm called Anh Babcock back at 9:03am  and I was told they are waiting for irene negative covid results for her caregiver, they will call me back  Freedom of Choice: Yes  Comments - Freedom of Choice: pt is acitve with [facility] for wound care, Anh Babcock will call me back if the caregiver can start her care  8am-8pm through the [facility]avier program  CM contacted family/caregiver?: Yes             Contacts  Patient Contacts: Christie oLcke  Relationship to Patient[de-identified] Family (niece)  Contact Method: Phone  Phone Number: [phone]  Reason/Outcome: Discharge [address]         Is the patient interested [facility] Yes         Other Referral/Resources/Interventions Provided:  Referral Comments: transportation needs to be  arranged once cm receives a call stating the caregiver is able to start tomorrow, pt and family deny any additional d/c needs         Treatment Team Recommendation: Home with [address] [facility] [facility] (home [facility] [address] [facility] caregiver- BLS)                                   IMM Given (Date):: 08/01/22  IMM Given to[de-identified] Patient  Family notified[de-identified] niece was called

## 2022-08-01 NOTE — CASE MANAGEMENT
Case Management Discharge Planning Note    Patient name Dileep Enamorado  Location [address] 224/-35 MRN [id number]  : 1950 Date 2022       Current Admission Date: 2022  Current Admission Diagnosis:Panniculitis   Patient Active Problem List    Diagnosis Date Noted    BMI 70 and over, adult ([facility] ) 2022    Chronic atrial fibrillation ([facility] ) 2022    History of Clostridioides difficile infection 2022    Lymphedema of extremity 2021    Other specified hypothyroidism 10/03/2020    Mild episode of recurrent major depressive disorder ([place] [address] ) 10/03/2020    Idiopathic chronic 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 10/03/2020    Primary osteoarthritis involving multiple joints 10/03/2020    Class 3 severe obesity due to excess calories with serious comorbidity and body mass index (BMI) greater than or equal to 70 in adult [facility]) 10/03/2020    Chronic diastolic congestive heart failure ([facility] ) 10/03/2020    Panniculitis 10/03/2020    Gastroesophageal reflux disease without esophagitis 10/03/2020    Hx MRSA infection 2020    Functional gait abnormality 2019    Impaired mobility 2019    Osteoarthritis of glenohumeral joint 2019    Left ovarian cyst 2017    Depression with anxiety 07/15/2017    Anemia 2016    Ambulatory dysfunction 2016    Adjustment disorder 2013    Irritable bowel syndrome 2012    Left atrial enlargement 2012    Left ventricular hypertrophy 2012    Carpal tunnel syndrome 2012    Gout 2012    Hyperlipidemia 2012    Osteoarthritis 2012    Symptomatic menopausal or female climacteric states 2012      LOS (days): 6  Geometric Mean LOS (GMLOS) (days): 2 90  Days to GMLOS:-3 7     OBJECTIVE:  Risk of Unplanned Readmission Score: 39 79         Current admission status: Inpatient   Preferred Pharmacy:    [address] [place]  [address]  [place] [address] [facility]  Phone: [phone] Fax: [phone]    [address] [place], [place] - [address]  [address]  [place] [address]  Phone: [phone] Fax: [phone]    Primary Care Provider: Precious Arnold MD    Primary Insurance: MEDICARE  Secondary Insurance: AARP    DISCHARGE DETAILS:  Cm placed a call to Cody Fabian (malini) at 13:32 pm to make her aware the pt's caregiver is not able to come back to work until Friday 8/5/2022, she is aware that [facility] is not able to accept, I asked if I could send to other snf for wound care or respite care while she waits for the agency to find caregivers, she stated the pt is able to make her own decision, cm met with the pt and she gave permission to send referrals and cm will let her know what facilities accept her and she will make her decision, wilfred will continue to work on a safe d/c plan

## 2022-08-01 NOTE — ASSESSMENT & PLAN NOTE
· Appreciate ID input  · Status post 7 days worth of IV cefepime-patient lost her IV access, and did not want to have another IV placed  · Patient now on Levaquin to complete course of therapy on 08/03/2022  · Abdominal ultrasound reviewed-no concern for abscesses  · Appreciate general surgical input-no acute phase intervention needed  · Medically stable for discharge  · Case reviewed with case management, they are working on discharge planning - patient may need to remain in-house until Tuesday August 2, 2022 at which time the patient's home healthcare provider will be able to resume services for the patient    In the interim, they are working short-term rehab options

## 2022-08-01 NOTE — PROGRESS NOTES
[facility]  Progress Note - Favio Romano 1950, 80 y o  female MRN: [id number]  Unit/Bed#: -01 Encounter: [id number]  Primary Care Provider: Carloz Jean-Baptiste MD   Date and time admitted to hospital: 7/25/2022  8:32 PM    * Panniculitis  Assessment & Plan  · Appreciate ID input  · Status post 7 days worth of IV cefepime-patient lost her IV access, and did not want to have another IV placed  · Patient now on Levaquin to complete course of therapy on 08/03/2022  · Abdominal ultrasound reviewed-no concern for abscesses  · Appreciate general surgical input-no acute phase intervention needed  · Medically stable for discharge  · Case reviewed with case management, they are working on discharge planning - patient may need to remain in-house until Tuesday August 2, 2022 at which time the patient's home healthcare provider will be able to resume services for the patient    In the interim, they are working short-term rehab options    Chronic atrial fibrillation [facility])  Assessment & Plan  · Patient is rate controlled at this time  · Will continue pre-hospital Coumadin 2 mg alternating with 4 mg daily  · INR is therapeutic    History of Clostridioides difficile infection  Assessment & Plan  · Continue vancomycin 125 mg p o  q 12 hours for 72 hours beyond completion of the antibiotic course    Chronic diastolic congestive heart failure ([facility] )  Assessment & Plan  Wt Readings from Last 3 Encounters:   07/25/22 (!) 201 kg (442 lb 7 4 oz)   06/14/22 (!) 211 kg (464 lb 8 2 oz)   05/08/22 (!) 212 kg (467 lb)     · Currently without exacerbation  · Continue Coumadin, continue Lasix  · INR is therapeutic  · Will continue to monitor I&Os daily weight        Class 3 severe obesity due to excess calories with serious comorbidity and body mass index (BMI) greater than or equal to 70 in adult [facility])  Assessment & Plan  · Actual BMI 80 93  · Dietary, weight loss, and lifestyle modification counseling was provided    Mild episode of recurrent major depressive disorder ([facility] )  Assessment & Plan  · Continue Cymbalta    Other specified hypothyroidism  Assessment & Plan  · Continue levothyroxine      VTE Prophylaxis:  Warfarin (Coumadin)    Patient Centered Rounds: I have performed bedside rounds with nursing staff today  Discussions with Specialists or Other Care Team Provider: yes  Education and Discussions with Family / Patient:  Updated patient regarding plan of care    Current Length of Stay: 6 day(s)    Current Patient Status: Inpatient   Certification Statement: The patient will continue to require additional inpatient hospital stay due to Panniculitis    Discharge Plan:  Hopeful discharge planning in the next 24 hours    Code Status: Level 1 - Full Code    Subjective:   No overnight events noted    Objective:     Vitals:   Temp (24hrs), Av 7 °F (36 5 °C), Min:97 5 °F (36 4 °C), Max:97 9 °F (36 6 °C)    Temp:  [97 5 °F (36 4 °C)-97 9 °F (36 6 °C)] 97 9 °F (36 6 °C)  HR:  [62-75] 62  Resp:  [18-20] 18  BP: (102-107)/(51-63) 102/51  SpO2:  [92 %-93 %] 93 %  Body mass index is 78 38 kg/m²  Input and Output Summary (last 24 hours): Intake/Output Summary (Last 24 hours) at 2022 1318  Last data filed at 2022 1305  Gross per 24 hour   Intake 600 ml   Output --   Net 600 ml       Physical Exam:   Physical Exam  Constitutional:       General: She is not in acute distress  Comments: Morbidly obese   HENT:      Head: Normocephalic and atraumatic  Nose: Nose normal       Mouth/Throat:      Mouth: Mucous membranes are moist    Eyes:      Extraocular Movements: Extraocular movements intact  Conjunctiva/sclera: Conjunctivae normal    Cardiovascular:      Rate and Rhythm: Normal rate and regular rhythm  Pulmonary:      Effort: Pulmonary effort is normal  No respiratory distress  Abdominal:      Palpations: Abdomen is soft  Tenderness: There is no abdominal tenderness  Musculoskeletal:         General: Normal range of motion  Cervical back: Normal range of motion and neck supple  Skin:     General: Skin is warm and dry  Comments: Abdominal wall panniculitis noted   Neurological:      General: No focal deficit present  Mental Status: She is alert  Mental status is at baseline  Cranial Nerves: No cranial nerve deficit  Psychiatric:         Mood and Affect: Mood normal          Behavior: Behavior normal          Additional Data:     Labs:    Results from last 7 days   Lab Units 08/01/22 0447   WBC Thousand/uL 10 35*   HEMOGLOBIN g/dL 10 1*   HEMATOCRIT % 34 6*   PLATELETS Thousands/uL 404*   NEUTROS PCT % 77*   LYMPHS PCT % 11*   MONOS PCT % 7   EOS PCT % 3     Results from last 7 days   Lab Units 08/01/22 0447 07/28/22 0444 07/27/22  0525   SODIUM mmol/L 134*   < > 137   POTASSIUM mmol/L 4 0   < > 4 1   CHLORIDE mmol/L 101   < > 98   CO2 mmol/L 25   < > 30   BUN mg/dL 39*   < > 28*   CREATININE mg/dL 1 17   < > 1 29   CALCIUM mg/dL 9 0   < > 8 5   ALK PHOS U/L  --   --  116*   ALT U/L  --   --  19   AST U/L  --   --  22    < > = values in this interval not displayed  Results from last 7 days   Lab Units 08/01/22 0447   INR  2 46*               * I Have Reviewed All Lab Data Listed Above  * Additional Pertinent Lab Tests Reviewed:  [address] Admission  Reviewed    Imaging:  Imaging Reports Reviewed Today Include:  No new imaging    Recent Cultures (last 7 days):           Last 24 Hours Medication List:   Current Facility-Administered Medications   Medication Dose Route Frequency Provider Last Rate    acetaminophen  650 mg Oral Q6H PRN Raghavendra Blood, PA-C      allopurinol  100 mg Oral Daily Raghavendra Caitie, PA-C      DULoxetine  20 mg Oral Daily Raghavendra Caitie, PA-C      furosemide  40 mg Oral Daily Raghavendra Caitie, PA-C      levofloxacin  750 mg Oral Q24H Lucie Forbes MD      levothyroxine  125 mcg Oral Early Morning Rosalind Chaudhari Mariel Rice PA-C      ondansetron  4 mg Intravenous Q6H PRN Raghavendra Blood PA-C      oxyCODONE  5 mg Oral Q6H PRN Raghavendra Blood PA-C      triamcinolone   Topical BID Lucie Forbes MD      vancomycin  125 mg Oral Q12H [address] Raghavendra Blood PA-C      warfarin  2 mg Oral Once per day on Sun Mon Wed Fri Raghavendra Blood PA-C      warfarin  4 mg Oral Once per day on Tue Thu Sat Raghavendra Blood PA-C          Today, Patient Was Seen By: Mare Madsen MD    ** Please Note: Dictation voice to text software may have been used in the creation of this document   **

## 2022-08-01 NOTE — PLAN OF CARE
Problem: MOBILITY - ADULT  Goal: Maintain or return to baseline ADL function  Description: INTERVENTIONS:  -  Assess patient's ability to carry out ADLs; assess patient's baseline for ADL function and identify physical deficits which impact ability to perform ADLs (bathing, care of mouth/teeth, toileting, grooming, dressing, etc )  - Assess/evaluate cause of self-care deficits   - Assess range of motion  - Assess patient's mobility; develop plan if impaired  - Assess patient's need for assistive devices and provide as appropriate  - Encourage maximum independence but intervene and supervise when necessary  - Involve family in performance of ADLs  - Assess for home care needs following discharge   - Consider OT consult to assist with ADL evaluation and planning for discharge  - Provide patient education as appropriate  Outcome: Progressing  Goal: Maintains/Returns to pre admission functional level  Description: INTERVENTIONS:  - Perform BMAT or MOVE assessment daily    - Set and communicate daily mobility goal to care team and patient/family/caregiver  - Collaborate with rehabilitation services on mobility goals if consulted  - Perform Range of Motion 4 times a day  - Reposition patient every 2 hours    - Dangle patient 2 times a day  - Stand patient 3 times a day  - Ambulate patient 3 times a day  - Out of bed to chair 3 times a day   - Out of bed for meals 3 times a day  - Out of bed for toileting  - Record patient progress and toleration of activity level   Outcome: Progressing

## 2022-08-02 LAB
ERYTHROCYTE [DISTWIDTH] IN BLOOD BY AUTOMATED COUNT: 18.3 % (ref 11.6–15.1)
HCT VFR BLD AUTO: 35.3 % (ref 34.8–46.1)
HGB BLD-MCNC: 10.4 G/DL (ref 11.5–15.4)
INR PPP: 2.41 (ref 0.84–1.19)
MCH RBC QN AUTO: 24.9 PG (ref 26.8–34.3)
MCHC RBC AUTO-ENTMCNC: 29.5 G/DL (ref 31.4–37.4)
MCV RBC AUTO: 84 FL (ref 82–98)
PLATELET # BLD AUTO: 411 THOUSANDS/UL (ref 149–390)
PMV BLD AUTO: 8.6 FL (ref 8.9–12.7)
PROTHROMBIN TIME: 26.1 SECONDS (ref 11.6–14.5)
RBC # BLD AUTO: 4.18 MILLION/UL (ref 3.81–5.12)
WBC # BLD AUTO: 10.68 THOUSAND/UL (ref 4.31–10.16)

## 2022-08-02 PROCEDURE — 99232 SBSQ HOSP IP/OBS MODERATE 35: CPT | Performed by: INTERNAL MEDICINE

## 2022-08-02 PROCEDURE — 85610 PROTHROMBIN TIME: CPT | Performed by: INTERNAL MEDICINE

## 2022-08-02 PROCEDURE — 85027 COMPLETE CBC AUTOMATED: CPT | Performed by: INTERNAL MEDICINE

## 2022-08-02 RX ADMIN — ALLOPURINOL 100 MG: 100 TABLET ORAL at 09:18

## 2022-08-02 RX ADMIN — Medication 125 MG: at 09:18

## 2022-08-02 RX ADMIN — TRIAMCINOLONE ACETONIDE: 1 CREAM TOPICAL at 11:57

## 2022-08-02 RX ADMIN — LEVOFLOXACIN 750 MG: 750 TABLET, FILM COATED ORAL at 14:45

## 2022-08-02 RX ADMIN — LEVOTHYROXINE SODIUM 125 MCG: 25 TABLET ORAL at 05:09

## 2022-08-02 RX ADMIN — ACETAMINOPHEN 650 MG: 325 TABLET ORAL at 01:29

## 2022-08-02 RX ADMIN — WARFARIN SODIUM 4 MG: 4 TABLET ORAL at 17:30

## 2022-08-02 RX ADMIN — OXYCODONE HYDROCHLORIDE 5 MG: 5 TABLET ORAL at 03:22

## 2022-08-02 RX ADMIN — Medication 125 MG: at 20:46

## 2022-08-02 RX ADMIN — DULOXETINE 20 MG: 20 CAPSULE, DELAYED RELEASE ORAL at 09:18

## 2022-08-02 NOTE — NURSING NOTE
Pt resting in bed comfortably, denies any needs and offers no complaints at this time  Dressing changed to open area located on L abd fold, applied Kenalog cream to abd folds and under R breast, pt tolerated well  Safety maintained  Bed in low position with 2 rails up with call bell and side table with personal items with in reach

## 2022-08-02 NOTE — CASE MANAGEMENT
Case Management Discharge Planning Note    Patient name [facility]  Location [address] 224/-24 MRN [id number]  : 1950 Date 2022       Current Admission Date: 2022  Current Admission Diagnosis:Panniculitis   Patient Active Problem List    Diagnosis Date Noted    BMI 70 and over, adult ([facility] ) 2022    Chronic atrial fibrillation ([facility] ) 2022    History of Clostridioides difficile infection 2022    Lymphedema of extremity 2021    Other specified hypothyroidism 10/03/2020    Mild episode of recurrent major depressive disorder ([place] [address] ) 10/03/2020    Idiopathic chronic 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 10/03/2020    Primary osteoarthritis involving multiple joints 10/03/2020    Class 3 severe obesity due to excess calories with serious comorbidity and body mass index (BMI) greater than or equal to 70 in adult [facility]) 10/03/2020    Chronic diastolic congestive heart failure ([facility] ) 10/03/2020    Panniculitis 10/03/2020    Gastroesophageal reflux disease without esophagitis 10/03/2020    Hx MRSA infection 2020    Functional gait abnormality 2019    Impaired mobility 2019    Osteoarthritis of glenohumeral joint 2019    Left ovarian cyst 2017    Depression with anxiety 07/15/2017    Anemia 2016    Ambulatory dysfunction 2016    Adjustment disorder 2013    Irritable bowel syndrome 2012    Left atrial enlargement 2012    Left ventricular hypertrophy 2012    Carpal tunnel syndrome 2012    Gout 2012    Hyperlipidemia 2012    Osteoarthritis 2012    Symptomatic menopausal or female climacteric states 2012      LOS (days): 7  Geometric Mean LOS (GMLOS) (days): 2 90  Days to GMLOS:-4 6     OBJECTIVE:  Risk of Unplanned Readmission Score: 40 21         Current admission status: Inpatient   Preferred Pharmacy:    [address] [place]  [address]  [place] [address] [facility]  Phone: [phone] Fax: [phone]    [address] [place], [place] - [address]  [address]  [facility] [address]  Phone: [phone] Fax: [phone]    Primary Care Provider: Sivan Swain MD    Primary Insurance: MEDICARE  Secondary Insurance: AARP    DISCHARGE DETAILS:    Discharge planning discussed with[de-identified] patient  Freedom of Choice: Yes  Comments - Freedom of Choice: referrals have been sent for shorterm rehab until jher care giver is able to return                               Other Referral/Resources/Interventions Provided:  Interventions: Short Term Rehab  Referral Comments: gutierrez is not able to [facility] a bed, [address] is reviewing, referral sent to [facility]    Would you like to participate in our [address] service program?  : No - Declined    Treatment Team Recommendation:  (d/c plan tbd-bls)

## 2022-08-02 NOTE — ASSESSMENT & PLAN NOTE
· Appreciate ID input  · Status post 7 days worth of IV cefepime-patient lost her IV access, and did not want to have another IV placed  · Patient now on Levaquin to complete course of therapy on 08/03/2022  · Abdominal ultrasound reviewed-no concern for abscesses  · Appreciate general surgical input-no acute phase intervention needed  · Medically stable for discharge  · Case reviewed with case management, they are working on discharge planning - patient may need to remain in-house until Friday at which time the patient's home healthcare provider will be able to resume services for the patient    In the interim, they are working short-term rehab options

## 2022-08-02 NOTE — PLAN OF CARE
Problem: MOBILITY - ADULT  Goal: Maintain or return to baseline ADL function  Description: INTERVENTIONS:  -  Assess patient's ability to carry out ADLs; assess patient's baseline for ADL function and identify physical deficits which impact ability to perform ADLs (bathing, care of mouth/teeth, toileting, grooming, dressing, etc )  - Assess/evaluate cause of self-care deficits   - Assess range of motion  - Assess patient's mobility; develop plan if impaired  - Assess patient's need for assistive devices and provide as appropriate  - Encourage maximum independence but intervene and supervise when necessary  - Involve family in performance of ADLs  - Assess for home care needs following discharge   - Consider OT consult to assist with ADL evaluation and planning for discharge  - Provide patient education as appropriate  Outcome: Progressing  Goal: Maintains/Returns to pre admission functional level  Description: INTERVENTIONS:  - Perform BMAT or MOVE assessment daily    - Set and communicate daily mobility goal to care team and patient/family/caregiver  - Collaborate with rehabilitation services on mobility goals if consulted  - Perform Range of Motion 3 times a day  - Reposition patient every 2 hours    - Dangle patient 3 times a day  - Stand patient 3 times a day  - Ambulate patient 3 times a day  - Out of bed to chair 3 times a day   - Out of bed for meals 3 times a day  - Out of bed for toileting  - Record patient progress and toleration of activity level   Outcome: Progressing     Problem: Prexisting or High Potential for Compromised Skin Integrity  Goal: Skin integrity is maintained or improved  Description: INTERVENTIONS:  - Identify patients at risk for skin breakdown  - Assess and monitor skin integrity  - Assess and monitor nutrition and hydration status  - Monitor labs   - Assess for incontinence   - Turn and reposition patient  - Assist with mobility/ambulation  - Relieve pressure over bony prominences  - Avoid friction and shearing  - Provide appropriate hygiene as needed including keeping skin clean and dry  - Evaluate need for skin moisturizer/barrier cream  - Collaborate with interdisciplinary team   - Patient/family teaching  - Consider wound care consult   Outcome: Progressing     Problem: Potential for Falls  Goal: Patient will remain free of falls  Description: INTERVENTIONS:  - Educate patient/family on patient safety including physical limitations  - Instruct patient to call for assistance with activity   - Consult OT/PT to assist with strengthening/mobility   - Keep Call bell within reach  - Keep bed low and locked with side rails adjusted as appropriate  - Keep care items and personal belongings within reach  - Initiate and maintain comfort rounds  - Make Fall Risk Sign visible to staff  - Offer Toileting every 2 Hours, in advance of need  - Initiate/Maintain bed alarm  - Obtain necessary fall risk management equipment  - Apply yellow socks and bracelet for high fall risk patients  - Consider moving patient to room near nurses station  Outcome: Progressing Chart reviewed. Discussed with nursing. No untoward incident happened with pt over the night. He  is a 35 yr , domiciled, employed, non-caregiver  male with hx of MDD with psychosis, trauma history, multiple suicidal attempts, multiple IPP, the last one was at [facility] May 8-21, 2021. Pt has polysubstance abuse/dependence (ETOH, cocaine/crack, heroine / methadone program in the past), last use was four days ago. Pt was non-compliant with treatment after discharge. He has suicidal ideations abut has no plan or no intention.  He denies a/v/h. He is compliant with medications here on the unit  and tolerats it well.     Pt came to [facility] ED for worsening depression and suicidal ideation, and behavior (cutting his neck superficially 1-2 weeks ago), living on the street x 2-3 weeks (he has living place).  yes...

## 2022-08-02 NOTE — CASE MANAGEMENT
Case Management Discharge Planning Note    Patient name Isaías Pacheco  Location [address] 224/-06 MRN [id number]  : 1950 Date 2022       Current Admission Date: 2022  Current Admission Diagnosis:Panniculitis   Patient Active Problem List    Diagnosis Date Noted    BMI 70 and over, adult ([facility] ) 2022    Chronic atrial fibrillation ([facility] ) 2022    History of Clostridioides difficile infection 2022    Lymphedema of extremity 2021    Other specified hypothyroidism 10/03/2020    Mild episode of recurrent major depressive disorder ([place] [address] ) 10/03/2020    Idiopathic chronic 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 10/03/2020    Primary osteoarthritis involving multiple joints 10/03/2020    Class 3 severe obesity due to excess calories with serious comorbidity and body mass index (BMI) greater than or equal to 70 in adult [facility]) 10/03/2020    Chronic diastolic congestive heart failure ([facility] ) 10/03/2020    Panniculitis 10/03/2020    Gastroesophageal reflux disease without esophagitis 10/03/2020    Hx MRSA infection 2020    Functional gait abnormality 2019    Impaired mobility 2019    Osteoarthritis of glenohumeral joint 2019    Left ovarian cyst 2017    Depression with anxiety 07/15/2017    Anemia 2016    Ambulatory dysfunction 2016    Adjustment disorder 2013    Irritable bowel syndrome 2012    Left atrial enlargement 2012    Left ventricular hypertrophy 2012    Carpal tunnel syndrome 2012    Gout 2012    Hyperlipidemia 2012    Osteoarthritis 2012    Symptomatic menopausal or female climacteric states 2012      LOS (days): 7  Geometric Mean LOS (GMLOS) (days): 2 90  Days to GMLOS:-4 8     OBJECTIVE:  Risk of Unplanned Readmission Score: 39 98         Current admission status: Inpatient   Preferred Pharmacy:    [address] [place]  [address]  [place] [address] [facility]  Phone: [phone] Fax: [phone] [address] [place], [place] - [address]  [address] [facility] [address]  Phone: [phone] Fax: [phone]    Primary Care Provider: Leslie Avery MD    Primary Insurance: MEDICARE  Secondary Insurance: AARP    DISCHARGE DETAILS:          Comments - Freedom of Choice: additional referrals have been sent to see if pt could go to snf for respite or short term rehab until her care giver is back  she has to have a negative covid test and s/s free                               Other Referral/Resources/Interventions Provided:  Interventions: Short Term Rehab

## 2022-08-02 NOTE — PROGRESS NOTES
[address]  Progress Note - Joyce Mealing 1950, 80 y o  female MRN: [id number]  Unit/Bed#: -01 Encounter: [id number]  Primary Care Provider: Cesar Stephens MD   Date and time admitted to hospital: 7/25/2022  8:32 PM    * Panniculitis  Assessment & Plan  · Appreciate ID input  · Status post 7 days worth of IV cefepime-patient lost her IV access, and did not want to have another IV placed  · Patient now on Levaquin to complete course of therapy on 08/03/2022  · Abdominal ultrasound reviewed-no concern for abscesses  · Appreciate general surgical input-no acute phase intervention needed  · Medically stable for discharge  · Case reviewed with case management, they are working on discharge planning - patient may need to remain in-house until Friday at which time the patient's home healthcare provider will be able to resume services for the patient    In the interim, they are working short-term rehab options    Chronic atrial fibrillation [facility])  Assessment & Plan  · Patient is rate controlled at this time  · Will continue pre-hospital Coumadin 2 mg alternating with 4 mg daily  · INR is therapeutic    History of Clostridioides difficile infection  Assessment & Plan  · Continue vancomycin 125 mg p o  q 12 hours for 72 hours beyond completion of the antibiotic course    Chronic diastolic congestive heart failure ([facility] )  Assessment & Plan  Wt Readings from Last 3 Encounters:   07/25/22 (!) 201 kg (442 lb 7 4 oz)   06/14/22 (!) 211 kg (464 lb 8 2 oz)   05/08/22 (!) 212 kg (467 lb)     · Currently without exacerbation  · Continue Coumadin, continue Lasix  · INR is therapeutic  · Will continue to monitor I&Os daily weight        Class 3 severe obesity due to excess calories with serious comorbidity and body mass index (BMI) greater than or equal to 70 in adult [facility])  Assessment & Plan  · Actual BMI 80 93  · Dietary, weight loss, and lifestyle modification counseling was provided    Mild episode of recurrent major depressive disorder ([facility] )  Assessment & Plan  · Continue Cymbalta    Other specified hypothyroidism  Assessment & Plan  · Continue levothyroxine      VTE Prophylaxis:  Warfarin (Coumadin)    Patient Centered Rounds: I have performed bedside rounds with nursing staff today  Discussions with Specialists or Other Care Team Provider: yes  Education and Discussions with Family / Patient:  Updated patient regarding plan of care    Current Length of Stay: 7 day(s)    Current Patient Status: Inpatient   Certification Statement: The patient will continue to require additional inpatient hospital stay due to Case management working on discharge plan    Discharge Plan:  Possible discharge on Friday as patient's home aide tested positive for COVID    Code Status: Level 1 - Full Code    Subjective:   No overnight events noted    Objective:     Vitals:   Temp (24hrs), Av 9 °F (36 6 °C), Min:97 6 °F (36 4 °C), Max:98 1 °F (36 7 °C)    Temp:  [97 6 °F (36 4 °C)-98 1 °F (36 7 °C)] 97 6 °F (36 4 °C)  HR:  [71-73] 71  Resp:  [18-20] 20  BP: ()/(53-56) 91/56  SpO2:  [91 %-95 %] 91 %  Body mass index is 78 38 kg/m²  Input and Output Summary (last 24 hours): Intake/Output Summary (Last 24 hours) at 2022 1336  Last data filed at 2022 1231  Gross per 24 hour   Intake 600 ml   Output --   Net 600 ml       Physical Exam:   Physical Exam  Constitutional:       General: She is not in acute distress  Comments: Morbid obesity   HENT:      Head: Normocephalic and atraumatic  Nose: Nose normal       Mouth/Throat:      Mouth: Mucous membranes are moist    Eyes:      Extraocular Movements: Extraocular movements intact  Conjunctiva/sclera: Conjunctivae normal    Cardiovascular:      Rate and Rhythm: Normal rate and regular rhythm  Pulmonary:      Effort: Pulmonary effort is normal  No respiratory distress     Abdominal:      Comments: Abdominal wall panniculitis noted   Musculoskeletal:         General: Normal range of motion  Cervical back: Normal range of motion and neck supple  Comments: Generalized weakness   Skin:     General: Skin is warm and dry  Neurological:      General: No focal deficit present  Mental Status: She is alert  Mental status is at baseline  Cranial Nerves: No cranial nerve deficit  Psychiatric:         Mood and Affect: Mood normal          Behavior: Behavior normal          Additional Data:     Labs:    Results from last 7 days   Lab Units 08/02/22 0433 08/01/22 0447   WBC Thousand/uL 10 68* 10 35*   HEMOGLOBIN g/dL 10 4* 10 1*   HEMATOCRIT % 35 3 34 6*   PLATELETS Thousands/uL 411* 404*   NEUTROS PCT %  --  77*   LYMPHS PCT %  --  11*   MONOS PCT %  --  7   EOS PCT %  --  3     Results from last 7 days   Lab Units 08/01/22 0447 07/28/22 0444 07/27/22  0525   SODIUM mmol/L 134*   < > 137   POTASSIUM mmol/L 4 0   < > 4 1   CHLORIDE mmol/L 101   < > 98   CO2 mmol/L 25   < > 30   BUN mg/dL 39*   < > 28*   CREATININE mg/dL 1 17   < > 1 29   CALCIUM mg/dL 9 0   < > 8 5   ALK PHOS U/L  --   --  116*   ALT U/L  --   --  19   AST U/L  --   --  22    < > = values in this interval not displayed  Results from last 7 days   Lab Units 08/02/22 0433   INR  2 41*               * I Have Reviewed All Lab Data Listed Above  * Additional Pertinent Lab Tests Reviewed:  [facility]  Reviewed    Imaging:  Imaging Reports Reviewed Today Include:  No new imaging    Recent Cultures (last 7 days):           Last 24 Hours Medication List:   Current Facility-Administered Medications   Medication Dose Route Frequency Provider Last Rate    acetaminophen  650 mg Oral Q6H PRN Osie Silence, PA-C      allopurinol  100 mg Oral Daily Osie Silence, PA-C      DULoxetine  20 mg Oral Daily Osie Silence, PA-C      furosemide  40 mg Oral Daily Osie Silence, PA-C      levofloxacin  750 mg Oral Q24H Maura Segura MD  levothyroxine  125 mcg Oral Early Morning Joaquim Huntley PA-C      ondansetron  4 mg Intravenous Q6H PRN Joaquim uHntley PA-C      oxyCODONE  5 mg Oral Q6H PRN Joaquim Huntley PA-C      triamcinolone   Topical BID Anais Hurtado MD      vancomycin  125 mg Oral Q12H [address] Joaquim Huntley PA-C      warfarin  2 mg Oral Once per day on Sun Mon Wed Fri Joaquim Huntley PA-C      warfarin  4 mg Oral Once per day on Tue Thu Sat Joaquim Huntley PA-C          Today, Patient Was Seen By: Ephraim Galvez MD    ** Please Note: Dictation voice to text software may have been used in the creation of this document   **

## 2022-08-02 NOTE — PLAN OF CARE
Problem: MOBILITY - ADULT  Goal: Maintain or return to baseline ADL function  Description: INTERVENTIONS:  -  Assess patient's ability to carry out ADLs; assess patient's baseline for ADL function and identify physical deficits which impact ability to perform ADLs (bathing, care of mouth/teeth, toileting, grooming, dressing, etc )  - Assess/evaluate cause of self-care deficits   - Assess range of motion  - Assess patient's mobility; develop plan if impaired  - Assess patient's need for assistive devices and provide as appropriate  - Encourage maximum independence but intervene and supervise when necessary  - Involve family in performance of ADLs  - Assess for home care needs following discharge   - Consider OT consult to assist with ADL evaluation and planning for discharge  - Provide patient education as appropriate  8/2/2022 1049 by Hardy Arizmendi RN  Outcome: Progressing  8/2/2022 1047 by Hardy Arizmendi RN  Outcome: Progressing  Goal: Maintains/Returns to pre admission functional level  Description: INTERVENTIONS:  - Perform BMAT or MOVE assessment daily    - Set and communicate daily mobility goal to care team and patient/family/caregiver  - Collaborate with rehabilitation services on mobility goals if consulted  - Reposition patient every 2 hours    - - Out of bed to chair  for meals   - Out of bed for toileting  - Record patient progress and toleration of activity level   8/2/2022 1049 by Hardy Arizmendi RN  Outcome: Progressing  8/2/2022 1047 by Hardy Arizmendi RN  Outcome: Progressing

## 2022-08-03 LAB
BASOPHILS # BLD AUTO: 0.08 THOUSANDS/ΜL (ref 0–0.1)
BASOPHILS NFR BLD AUTO: 1 % (ref 0–1)
EOSINOPHIL # BLD AUTO: 0.35 THOUSAND/ΜL (ref 0–0.61)
EOSINOPHIL NFR BLD AUTO: 3 % (ref 0–6)
ERYTHROCYTE [DISTWIDTH] IN BLOOD BY AUTOMATED COUNT: 17.9 % (ref 11.6–15.1)
HCT VFR BLD AUTO: 34.8 % (ref 34.8–46.1)
HGB BLD-MCNC: 10.2 G/DL (ref 11.5–15.4)
IMM GRANULOCYTES # BLD AUTO: 0.06 THOUSAND/UL (ref 0–0.2)
IMM GRANULOCYTES NFR BLD AUTO: 1 % (ref 0–2)
INR PPP: 2.25 (ref 0.84–1.19)
LYMPHOCYTES # BLD AUTO: 1.44 THOUSANDS/ΜL (ref 0.6–4.47)
LYMPHOCYTES NFR BLD AUTO: 13 % (ref 14–44)
MCH RBC QN AUTO: 24.8 PG (ref 26.8–34.3)
MCHC RBC AUTO-ENTMCNC: 29.3 G/DL (ref 31.4–37.4)
MCV RBC AUTO: 85 FL (ref 82–98)
MONOCYTES # BLD AUTO: 0.72 THOUSAND/ΜL (ref 0.17–1.22)
MONOCYTES NFR BLD AUTO: 7 % (ref 4–12)
NEUTROPHILS # BLD AUTO: 8.29 THOUSANDS/ΜL (ref 1.85–7.62)
NEUTS SEG NFR BLD AUTO: 75 % (ref 43–75)
NRBC BLD AUTO-RTO: 0 /100 WBCS
PLATELET # BLD AUTO: 418 THOUSANDS/UL (ref 149–390)
PMV BLD AUTO: 8.6 FL (ref 8.9–12.7)
PROTHROMBIN TIME: 24.8 SECONDS (ref 11.6–14.5)
RBC # BLD AUTO: 4.11 MILLION/UL (ref 3.81–5.12)
WBC # BLD AUTO: 10.94 THOUSAND/UL (ref 4.31–10.16)

## 2022-08-03 PROCEDURE — 85025 COMPLETE CBC W/AUTO DIFF WBC: CPT | Performed by: INTERNAL MEDICINE

## 2022-08-03 PROCEDURE — 85610 PROTHROMBIN TIME: CPT | Performed by: INTERNAL MEDICINE

## 2022-08-03 PROCEDURE — 99232 SBSQ HOSP IP/OBS MODERATE 35: CPT | Performed by: INTERNAL MEDICINE

## 2022-08-03 RX ADMIN — TRIAMCINOLONE ACETONIDE: 1 CREAM TOPICAL at 17:00

## 2022-08-03 RX ADMIN — Medication 125 MG: at 21:25

## 2022-08-03 RX ADMIN — Medication 125 MG: at 09:30

## 2022-08-03 RX ADMIN — DULOXETINE 20 MG: 20 CAPSULE, DELAYED RELEASE ORAL at 09:30

## 2022-08-03 RX ADMIN — WARFARIN SODIUM 2 MG: 2 TABLET ORAL at 16:58

## 2022-08-03 RX ADMIN — LEVOTHYROXINE SODIUM 125 MCG: 25 TABLET ORAL at 05:08

## 2022-08-03 RX ADMIN — TRIAMCINOLONE ACETONIDE: 1 CREAM TOPICAL at 09:32

## 2022-08-03 RX ADMIN — ALLOPURINOL 100 MG: 100 TABLET ORAL at 09:30

## 2022-08-03 NOTE — ASSESSMENT & PLAN NOTE
· Appreciate ID input  · Patient completed 10 days of antibiotics  · Abdominal ultrasound reviewed-no concern for abscesses  · Appreciate general surgical input-no acute phase intervention needed  · Medically stable for discharge  · Case reviewed with case management, they are working on discharge planning - patient may need to remain in-house until Friday at which time the patient's home healthcare provider will be able to resume services for the patient    In the interim, they are working short-term rehab options

## 2022-08-03 NOTE — PLAN OF CARE
Problem: MOBILITY - ADULT  Goal: Maintain or return to baseline ADL function  Description: INTERVENTIONS:  -  Assess patient's ability to carry out ADLs; assess patient's baseline for ADL function and identify physical deficits which impact ability to perform ADLs (bathing, care of mouth/teeth, toileting, grooming, dressing, etc )  - Assess/evaluate cause of self-care deficits   - Assess range of motion  - Assess patient's mobility; develop plan if impaired  - Assess patient's need for assistive devices and provide as appropriate  - Encourage maximum independence but intervene and supervise when necessary  - Involve family in performance of ADLs  - Assess for home care needs following discharge   - Consider OT consult to assist with ADL evaluation and planning for discharge  - Provide patient education as appropriate  Outcome: Progressing  Goal: Maintains/Returns to pre admission functional level  Description: INTERVENTIONS:  - Perform BMAT or MOVE assessment daily    - Set and communicate daily mobility goal to care team and patient/family/caregiver  - Collaborate with rehabilitation services on mobility goals if consulted  - Reposition patient every 2 hours    - - Out of bed to chair  for meals   - Out of bed for toileting  - Record patient progress and toleration of activity level   Outcome: Progressing     Problem: Prexisting or High Potential for Compromised Skin Integrity  Goal: Skin integrity is maintained or improved  Description: INTERVENTIONS:  - Identify patients at risk for skin breakdown  - Assess and monitor skin integrity  - Assess and monitor nutrition and hydration status  - Monitor labs   - Assess for incontinence   - Turn and reposition patient  - Assist with mobility/ambulation  - Relieve pressure over bony prominences  - Avoid friction and shearing  - Provide appropriate hygiene as needed including keeping skin clean and dry  - Evaluate need for skin moisturizer/barrier cream  - Collaborate with interdisciplinary team   - Patient/family teaching  - Consider wound care consult   Outcome: Progressing     Problem: Potential for Falls  Goal: Patient will remain free of falls  Description: INTERVENTIONS:  - Educate patient/family on patient safety including physical limitations  - Instruct patient to call for assistance with activity   - Consult OT/PT to assist with strengthening/mobility   - Keep Call bell within reach  - Keep bed low and locked with side rails adjusted as appropriate  - Keep care items and personal belongings within reach  - Initiate and maintain comfort rounds  - Make Fall Risk Sign visible to staff  - Offer Toileting every 2 Hours, in advance of need  - Initiate/Maintain alarms  - Obtain necessary fall risk management equipment:   - Apply yellow socks and bracelet for high fall risk patients  - Consider moving patient to room near nurses station  Outcome: Progressing

## 2022-08-03 NOTE — PLAN OF CARE
Problem: MOBILITY - ADULT  Goal: Maintain or return to baseline ADL function  Description: INTERVENTIONS:  -  Assess patient's ability to carry out ADLs; assess patient's baseline for ADL function and identify physical deficits which impact ability to perform ADLs (bathing, care of mouth/teeth, toileting, grooming, dressing, etc )  - Assess/evaluate cause of self-care deficits   - Assess range of motion  - Assess patient's mobility; develop plan if impaired  - Assess patient's need for assistive devices and provide as appropriate  - Encourage maximum independence but intervene and supervise when necessary  - Involve family in performance of ADLs  - Assess for home care needs following discharge   - Consider OT consult to assist with ADL evaluation and planning for discharge  - Provide patient education as appropriate  Outcome: Progressing  Goal: Maintains/Returns to pre admission functional level  Description: INTERVENTIONS:  - Perform BMAT or MOVE assessment daily    - Set and communicate daily mobility goal to care team and patient/family/caregiver  - Collaborate with rehabilitation services on mobility goals if consulted  - Reposition patient every 2 hours    - - Out of bed to chair  for meals   - Out of bed for toileting  - Record patient progress and toleration of activity level   Outcome: Progressing

## 2022-08-03 NOTE — CASE MANAGEMENT
Case Management Discharge Planning Note    Patient name Joyce Mealing  Location [address] 224/-[id number] MRN [id number]  : 1950 Date 8/3/2022       Current Admission Date: 2022  Current Admission Diagnosis:Panniculitis   Patient Active Problem List    Diagnosis Date Noted    BMI 70 and over, adult ([facility] ) 2022    Chronic atrial fibrillation ([facility] ) 2022    History of Clostridioides difficile infection 2022    Lymphedema of extremity 2021    Other specified hypothyroidism 10/03/2020    Mild episode of recurrent major depressive disorder ([place] [address] ) 10/03/2020    Idiopathic chronic 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 10/03/2020    Primary osteoarthritis involving multiple joints 10/03/2020    Class 3 severe obesity due to excess calories with serious comorbidity and body mass index (BMI) greater than or equal to 70 in adult [facility]) 10/03/2020    Chronic diastolic congestive heart failure ([facility] ) 10/03/2020    Panniculitis 10/03/2020    Gastroesophageal reflux disease without esophagitis 10/03/2020    Hx MRSA infection 2020    Functional gait abnormality 2019    Impaired mobility 2019    Osteoarthritis of glenohumeral joint 2019    Left ovarian cyst 2017    Depression with anxiety 07/15/2017    Anemia 2016    Ambulatory dysfunction 2016    Adjustment disorder 2013    Irritable bowel syndrome 2012    Left atrial enlargement 2012    Left ventricular hypertrophy 2012    Carpal tunnel syndrome 2012    Gout 2012    Hyperlipidemia 2012    Osteoarthritis 2012    Symptomatic menopausal or female climacteric states 2012      LOS (days): 8  Geometric Mean LOS (GMLOS) (days): 2 90  Days to GMLOS:-5 5     OBJECTIVE:  Risk of Unplanned Readmission Score: 40 26         Current admission status: Inpatient   Preferred Pharmacy:    [address] [place]  [address]  [place] [address] [facility]  Phone: [phone] Fax: [phone]    [facility] [place], [place] - [address]  [address]  [facility] [address]  Phone: [phone] Fax: [phone]    Primary Care Provider: Reese Villalobos MD    Primary Insurance: MEDICARE  Secondary Insurance: AARP    DISCHARGE DETAILS:          Comments - Freedom of Choice: additonal referrals for snf have been sent                               Other Referral/Resources/Interventions Provided:  Interventions: [address], Short Term Rehab  Referral Comments: additonal referrals have been sent for short term rehab,         Treatment Team Recommendation: Home with [address] [place] [address] (home with caregivers &[facility] vs snf -transport)                                            Additional Comments: wilfred placed a call to [address] caregivers @9:42 to # [phone] and I spoke with Lety Curtis  she stated she has not found another caregiver and she is" banking on" the pt's care giver to return Friday at her regular time,  cm will try to set up a 10 am transport for Friday 8/5/2022  cm has placed additonal referrals to snf for the pt to go for some short term or respite care if her care giver is not able to return as planned , wilfred did ask Ginna to keep trying for a back up plan if needed

## 2022-08-03 NOTE — PROGRESS NOTES
[address]   Progress Note - Zoe Georges 1950, 80 y o  female MRN: [id number]  Unit/Bed#: -01 Encounter: [id number]  Primary Care Provider: Sivan Swain MD   Date and time admitted to hospital: 7/25/2022  8:32 PM    * Panniculitis  Assessment & Plan  · Appreciate ID input  · Patient completed 10 days of antibiotics  · Abdominal ultrasound reviewed-no concern for abscesses  · Appreciate general surgical input-no acute phase intervention needed  · Medically stable for discharge  · Case reviewed with case management, they are working on discharge planning - patient may need to remain in-house until Friday at which time the patient's home healthcare provider will be able to resume services for the patient    In the interim, they are working short-term rehab options    Chronic atrial fibrillation [facility])  Assessment & Plan  · Patient is rate controlled at this time  · Will continue pre-hospital Coumadin 2 mg alternating with 4 mg daily  · INR is therapeutic    History of Clostridioides difficile infection  Assessment & Plan  · Continue vancomycin 125 mg p o  q 12 hours for 72 hours beyond completion of the antibiotic course    Chronic diastolic congestive heart failure ([facility] )  Assessment & Plan  Wt Readings from Last 3 Encounters:   07/25/22 (!) 201 kg (442 lb 7 4 oz)   06/14/22 (!) 211 kg (464 lb 8 2 oz)   05/08/22 (!) 212 kg (467 lb)     · Currently without exacerbation  · Continue Coumadin, continue Lasix  · INR is therapeutic  · Will continue to monitor I&Os daily weight        Class 3 severe obesity due to excess calories with serious comorbidity and body mass index (BMI) greater than or equal to 70 in adult [facility])  Assessment & Plan  · Actual BMI 80 93  · Dietary, weight loss, and lifestyle modification counseling was provided    Mild episode of recurrent major depressive disorder ([facility] )  Assessment & Plan  · Continue Cymbalta    Other specified hypothyroidism  Assessment & Plan  · Continue levothyroxine      VTE Prophylaxis:  Warfarin (Coumadin)    Patient Centered Rounds: I have performed bedside rounds with nursing staff today  Discussions with Specialists or Other Care Team Provider: yes  Education and Discussions with Family / Patient:  Updated patient regarding plan of care    Current Length of Stay: 8 day(s)    Current Patient Status: Inpatient   Certification Statement: The patient will continue to require additional inpatient hospital stay due to Pending discharge home once home aids are in place    Discharge Plan:  Hopeful discharge on Friday    Code Status: Level 1 - Full Code    Subjective:   No overnight events noted    Objective:     Vitals:   Temp (24hrs), Av 8 °F (36 6 °C), Min:97 6 °F (36 4 °C), Max:98 °F (36 7 °C)    Temp:  [97 6 °F (36 4 °C)-98 °F (36 7 °C)] 98 °F (36 7 °C)  HR:  [67-74] 74  Resp:  [17-25] 17  BP: ()/(51-67) 99/56  SpO2:  [93 %-97 %] 96 %  Body mass index is 78 38 kg/m²  Input and Output Summary (last 24 hours): Intake/Output Summary (Last 24 hours) at 8/3/2022 1345  Last data filed at 2022 1700  Gross per 24 hour   Intake 60 ml   Output --   Net 60 ml       Physical Exam:   Physical Exam  Constitutional:       General: She is not in acute distress  Comments: Morbidly obese female   HENT:      Head: Normocephalic and atraumatic  Nose: Nose normal       Mouth/Throat:      Mouth: Mucous membranes are moist    Eyes:      Extraocular Movements: Extraocular movements intact  Conjunctiva/sclera: Conjunctivae normal    Cardiovascular:      Rate and Rhythm: Normal rate and regular rhythm  Pulmonary:      Effort: Pulmonary effort is normal  No respiratory distress  Abdominal:      Palpations: Abdomen is soft  Tenderness: There is no abdominal tenderness  Musculoskeletal:         General: Normal range of motion  Cervical back: Normal range of motion and neck supple  Comments: Generalized weakness   Skin:     General: Skin is warm and dry  Neurological:      General: No focal deficit present  Mental Status: She is alert  Mental status is at baseline  Cranial Nerves: No cranial nerve deficit  Psychiatric:         Mood and Affect: Mood normal          Behavior: Behavior normal          Additional Data:     Labs:    Results from last 7 days   Lab Units 08/03/22  0508   WBC Thousand/uL 10 94*   HEMOGLOBIN g/dL 10 2*   HEMATOCRIT % 34 8   PLATELETS Thousands/uL 418*   NEUTROS PCT % 75   LYMPHS PCT % 13*   MONOS PCT % 7   EOS PCT % 3     Results from last 7 days   Lab Units 08/01/22  0447   SODIUM mmol/L 134*   POTASSIUM mmol/L 4 0   CHLORIDE mmol/L 101   CO2 mmol/L 25   BUN mg/dL 39*   CREATININE mg/dL 1 17   CALCIUM mg/dL 9 0     Results from last 7 days   Lab Units 08/03/22  0508   INR  2 25*               * I Have Reviewed All Lab Data Listed Above  * Additional Pertinent Lab Tests Reviewed:  [facility]  Reviewed    Imaging:  Imaging Reports Reviewed Today Include:  No new imaging    Recent Cultures (last 7 days):           Last 24 Hours Medication List:   Current Facility-Administered Medications   Medication Dose Route Frequency Provider Last Rate    acetaminophen  650 mg Oral Q6H PRN Alphonsa Part, PA-C      allopurinol  100 mg Oral Daily Alphonsa Part, PA-C      DULoxetine  20 mg Oral Daily Alphonsa Part, PA-C      furosemide  40 mg Oral Daily Alphonsa Part, PA-C      levothyroxine  125 mcg Oral Early Morning Alphonsa Part, PA-C      ondansetron  4 mg Intravenous Q6H PRN Alphonsa Part, PA-C      oxyCODONE  5 mg Oral Q6H PRN Alphonsa Part, PA-C      triamcinolone   Topical BID Ranjan Fererr MD      vancomycin  125 mg Oral Q12H [facility], PA-C      warfarin  2 mg Oral Once per day on Sun Mon Wed Fri Alphonsa Part, PA-C      warfarin  4 mg Oral Once per day on Tue Thu Sat Alphonsa Part, PA-C          Today, Patient Was Seen By: Yesenia Kessler MD    ** Please Note: Dictation voice to text software may have been used in the creation of this document   **

## 2022-08-04 LAB
INR PPP: 2.19 (ref 0.84–1.19)
PROTHROMBIN TIME: 24.2 SECONDS (ref 11.6–14.5)

## 2022-08-04 PROCEDURE — 99232 SBSQ HOSP IP/OBS MODERATE 35: CPT | Performed by: PHYSICIAN ASSISTANT

## 2022-08-04 PROCEDURE — 85610 PROTHROMBIN TIME: CPT | Performed by: INTERNAL MEDICINE

## 2022-08-04 RX ADMIN — TRIAMCINOLONE ACETONIDE: 1 CREAM TOPICAL at 10:08

## 2022-08-04 RX ADMIN — Medication 125 MG: at 10:07

## 2022-08-04 RX ADMIN — FUROSEMIDE 40 MG: 40 TABLET ORAL at 10:07

## 2022-08-04 RX ADMIN — LEVOTHYROXINE SODIUM 125 MCG: 25 TABLET ORAL at 05:18

## 2022-08-04 RX ADMIN — WARFARIN SODIUM 4 MG: 4 TABLET ORAL at 16:36

## 2022-08-04 RX ADMIN — DULOXETINE 20 MG: 20 CAPSULE, DELAYED RELEASE ORAL at 10:07

## 2022-08-04 RX ADMIN — Medication 125 MG: at 20:26

## 2022-08-04 RX ADMIN — ALLOPURINOL 100 MG: 100 TABLET ORAL at 10:07

## 2022-08-04 NOTE — DISCHARGE INSTR - OTHER ORDERS
Skin and Wound Care Plan:   1  Apply Hydraguard to b/l heels, buttocks and sacrum BID and PRN  2  Apply skin nourishing cream to the skin daily  3  Turn and reposition patient Q2 hours  4  Cleanse abdominal fold, thigh folds, knee folds and left lateral thigh with soap and water daily, pat dry  Apply Kenalog cream to areas of erythema to the folds, place 4X4's in abdominal fold, knee and thigh folds  Left lateral thigh place adult brief over area of erythema, change PRN with drainage   Mid abdominal fold open wound bed, apply dime thickness of Triad paste and cover with Allevyn life silicone bordered foam dressing, dinorah with T, change daily and PRN

## 2022-08-04 NOTE — WOUND OSTOMY CARE
Progress Note - Wound   Bhaskar Feeling 80 y o  female MRN: [id number]  Unit/Bed#: -01 Encounter: [id number]    Assessment:   Weekly wound care follow up  Patient in bed on [facility] bariatric bed  She is awake, alert and oriented  Cooperative with assessment and treatment  The patient can be repositioned in bed with assist of 1 and is dependent upon nursing staff or caretaker for all of her needs  Patient has a purewick in place for urinary management, and is not incontinent of stool  Patient can feed herself  Findings  1  Bilateral heels intact  Plantar surface of left foot blanchable purple area at the heel, patient has no pain  2  Buttocks and sacrum intact  Scattered areas of blanchable hyperpigmented skin noted to the buttocks and posterior thighs  3  Mid abdominal fold partial thickness wound beefy red with small amount of bloody drainage when cleansed  Yellow stranding has resolved  Wound has decreased in size  ITD/MASD chronic areas to the pannus  Per patient her caregiver applies Kenalog cream to areas of erythema and places 4X4's, irritation clears up, then reoccurs  Patient refuses Maxorb, Maxorb rope and Interdry  Per patient she gets a rash when using any of those products  Cleansed fold with soap and water, patted dry, applied Triad paste to open wound to mid abdomen and topped with Allevyn life silicone bordered foam dressing  Folks to the thighs and knees intact  4  Right breast fold MASD/ITD, beefy red, no drainage  5  Left lateral thigh-hip cellulitis  Patient has recurrent cellulitis to this area  Area is red and edematous  Cleansed and adult disposable brief placed as per patient request    Skin and Wound Care Plan:   1  Apply Hydraguard to b/l heels, buttocks and sacrum BID and PRN  2  Apply skin nourishing cream to the skin daily  3  Turn and reposition patient Q2 hours  4  Cleanse abdominal fold, thigh folds, knee folds and left lateral thigh with soap and water daily, pat dry  Apply Kenalog cream to areas of erythema to the folds, place 4X4's in abdominal fold, knee and thigh folds  Left lateral thigh place adult brief over area of erythema, change PRN with drainage  Mid abdominal fold open wound bed, apply dime thickness of Triad paste and cover with Allevyn life silicone bordered foam dressing, dinorah with T, change daily and PRN     Wound:  Wound 07/27/22 MASD Hip Anterior (Active)   Wound Image   08/03/22 1010   Wound Description MIAH 08/03/22 2100   Alison-wound Assessment Fragile 08/03/22 1010   Wound Length (cm) 2 5 cm 08/03/22 1010   Wound Width (cm) 2 5 cm 08/03/22 1010   Wound Depth (cm) 0 1 cm 08/03/22 1010   Wound Surface Area (cm^2) 6 25 cm^2 08/03/22 1010   Wound Volume (cm^3) 0 625 cm^3 08/03/22 1010   Calculated Wound Volume (cm^3) 0 63 cm^3 08/03/22 1010   Change in Wound Size % 28 41 08/03/22 1010   Tunneling 0 cm 07/27/22 1430   Drainage Amount Small 08/03/22 1010   Drainage Description Bloody 08/03/22 1010   Non-staged Wound Description Partial thickness 08/03/22 1010   Treatments Cleansed;Irrigation with NSS 08/03/22 1010   Dressing Foam, Silicon (eg  Allevyn, etc); Other (Comment) 08/03/22 1010   Wound packed? No 07/31/22 2124   Dressing Changed Changed 08/03/22 1010   Patient Tolerance Tolerated well 08/03/22 1010   Dressing Status Clean;Dry; Intact 08/03/22 2100       Wound 07/27/22 Other (comment) Cellulitis Thigh Left;Proximal;Lateral (Active)   Wound Image   08/03/22 1014   Wound Description Beefy red 08/03/22 2100   Alison-wound Assessment Fragile 08/03/22 2100   Drainage Amount Small 08/03/22 1014   Drainage Description Serous 08/03/22 1014   Treatments Cleansed 08/03/22 1014   Dressing Other (Comment) 08/03/22 1014   Dressing Changed Changed 07/31/22 1700   Patient Tolerance Tolerated well 08/03/22 1014     Reviewed plan of care with primary RN Gail Elias  Recommendations written as orders  Wound care team to follow weekly while admitted  Questions or concerns [facility] Wound Care Nurse    Monse ELIZONDON, RN, [employer]

## 2022-08-04 NOTE — ASSESSMENT & PLAN NOTE
· Appreciate ID input  · Patient completed 10 days of antibiotics  · Abdominal ultrasound reviewed-no concern for abscesses  · Appreciate general surgical input-no acute phase intervention needed  · Medically stable for discharge  · Case reviewed with case management, they are working on discharge planning - patient may need to remain in-house until tomorrow 8/5 at which time the patient's home healthcare provider will be able to resume services for the patient    In the interim, they are working short-term rehab options

## 2022-08-04 NOTE — PROGRESS NOTES
[address]  Progress Note - Shane Rivera 1950, 80 y o  female MRN: [id number]  Unit/Bed#: -01 Encounter: [id number]  Primary Care Provider: Tristen Tamayo MD   Date and time admitted to hospital: 7/25/2022  8:32 PM    * Panniculitis  Assessment & Plan  · Appreciate ID input  · Patient completed 10 days of antibiotics  · Abdominal ultrasound reviewed-no concern for abscesses  · Appreciate general surgical input-no acute phase intervention needed  · Medically stable for discharge  · Case reviewed with case management, they are working on discharge planning - patient may need to remain in-house until tomorrow 8/5 at which time the patient's home healthcare provider will be able to resume services for the patient    In the interim, they are working short-term rehab options    Chronic atrial fibrillation [facility])  Assessment & Plan  · Patient is rate controlled at this time  · Will continue pre-hospital Coumadin 2 mg alternating with 4 mg daily  · INR is therapeutic    History of Clostridioides difficile infection  Assessment & Plan  · Continue vancomycin 125 mg p o  q 12 hours for 72 hours beyond completion of the antibiotic course    Chronic diastolic congestive heart failure ([facility] )  Assessment & Plan  Wt Readings from Last 3 Encounters:   07/25/22 (!) 201 kg (442 lb 7 4 oz)   06/14/22 (!) 211 kg (464 lb 8 2 oz)   05/08/22 (!) 212 kg (467 lb)     · Currently without exacerbation  · Continue Coumadin, continue Lasix  · INR is therapeutic  · Will continue to monitor I&Os daily weight        Class 3 severe obesity due to excess calories with serious comorbidity and body mass index (BMI) greater than or equal to 70 in adult [facility])  Assessment & Plan  · Actual BMI 80 93  · Dietary, weight loss, and lifestyle modification counseling was provided    Mild episode of recurrent major depressive disorder ([facility] )  Assessment & Plan  · Continue Cymbalta    Other specified hypothyroidism  Assessment & Plan  · Continue levothyroxine        VTE Pharmacologic Prophylaxis: VTE Score: 9 High Risk (Score >/= 5) - Pharmacological DVT Prophylaxis Ordered: warfarin (Coumadin)  Sequential Compression Devices Ordered  Patient Centered Rounds: I performed bedside rounds with nursing staff today  Discussions with Specialists or Other Care Team Provider: nursing, CM    Education and Discussions with Family / Patient: patient  Time Spent for Care: 20 minutes  More than 50% of total time spent on counseling and coordination of care as described above  Current Length of Stay: 9 day(s)  Current Patient Status: Inpatient   Certification Statement: The patient will continue to require additional inpatient hospital stay due to discharge planning  Discharge Plan: patient medically stable for discharge- likely discharge home tomorrow    Code Status: Level 1 - Full Code    Subjective: The patient was seen and examined  The patient states she continues to drain from her left hip area, she states this has improved  Objective:     Vitals:   Temp (24hrs), Av 5 °F (36 4 °C), Min:97 2 °F (36 2 °C), Max:97 7 °F (36 5 °C)    Temp:  [97 2 °F (36 2 °C)-97 7 °F (36 5 °C)] 97 5 °F (36 4 °C)  HR:  [69-76] 76  Resp:  [20-24] 24  BP: (112-115)/(52-71) 112/65  SpO2:  [96 %-97 %] 96 %  Body mass index is 78 38 kg/m²  Input and Output Summary (last 24 hours):   No intake or output data in the 24 hours ending 22 6883    Physical Exam:   Physical Exam  Vitals and nursing note reviewed  Constitutional:       General: She is awake  Appearance: She is morbidly obese  Cardiovascular:      Rate and Rhythm: Normal rate and regular rhythm  Pulmonary:      Effort: Pulmonary effort is normal       Breath sounds: Normal breath sounds  Abdominal:      General: Bowel sounds are normal       Palpations: Abdomen is soft  Tenderness: There is no abdominal tenderness         Skin:     General: Skin is warm and dry  Neurological:      General: No focal deficit present  Mental Status: She is alert and oriented to person, place, and time  Psychiatric:         Attention and Perception: Attention normal          Mood and Affect: Mood normal          Speech: Speech normal          Behavior: Behavior is cooperative  Additional Data:     Labs:  Results from last 7 days   Lab Units 08/03/22  0508   WBC Thousand/uL 10 94*   HEMOGLOBIN g/dL 10 2*   HEMATOCRIT % 34 8   PLATELETS Thousands/uL 418*   NEUTROS PCT % 75   LYMPHS PCT % 13*   MONOS PCT % 7   EOS PCT % 3     Results from last 7 days   Lab Units 08/01/22  0447   SODIUM mmol/L 134*   POTASSIUM mmol/L 4 0   CHLORIDE mmol/L 101   CO2 mmol/L 25   BUN mg/dL 39*   CREATININE mg/dL 1 17   ANION GAP mmol/L 8   CALCIUM mg/dL 9 0   GLUCOSE RANDOM mg/dL 100     Results from last 7 days   Lab Units 08/04/22  0517   INR  2 19*                   Lines/Drains:  Invasive Devices  Report    None                       Imaging: No pertinent imaging reviewed      Recent Cultures (last 7 days):         Last 24 Hours Medication List:   Current Facility-Administered Medications   Medication Dose Route Frequency Provider Last Rate    acetaminophen  650 mg Oral Q6H PRN Caren Caceres PA-C      allopurinol  100 mg Oral Daily Caren Caceres PA-C      DULoxetine  20 mg Oral Daily Caren Caceres PA-C      furosemide  40 mg Oral Daily Caren Caceres PA-C      levothyroxine  125 mcg Oral Early Morning Caren Caceres PA-C      ondansetron  4 mg Intravenous Q6H PRN Caren Caceres PA-C      oxyCODONE  5 mg Oral Q6H PRN Caren Caceres PA-C      triamcinolone   Topical BID Rosie Babin MD      vancomycin  125 mg Oral Q12H [facility] Caren Caceres PA-C      warfarin  2 mg Oral Once per day on Sun Mon Wed Fri Caren Caceres PA-C      warfarin  4 mg Oral Once per day on Tue Thu Sat Caren Caceres PA-C          Today, Patient Was Seen By: Paige Carmona PA-C    **Please Note: This note may have been constructed using a voice recognition system  **

## 2022-08-04 NOTE — CASE MANAGEMENT
Case Management Discharge Planning Note    Patient name Ambika Plata  Location [address] 224/-05 MRN [id number]  : 1950 Date 2022       Current Admission Date: 2022  Current Admission Diagnosis:Panniculitis   Patient Active Problem List    Diagnosis Date Noted    BMI 70 and over, adult ([place] [address] ) 2022    Chronic atrial fibrillation ([place] [address] ) 2022    History of Clostridioides difficile infection 2022    Lymphedema of extremity 2021    Other specified hypothyroidism 10/03/2020    Mild episode of recurrent major depressive disorder ([place] [address] ) 10/03/2020    Idiopathic chronic 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 10/03/2020    Primary osteoarthritis involving multiple joints 10/03/2020    Class 3 severe obesity due to excess calories with serious comorbidity and body mass index (BMI) greater than or equal to 70 in adult [facility]) 10/03/2020    Chronic diastolic congestive heart failure ([place] [address] ) 10/03/2020    Panniculitis 10/03/2020    Gastroesophageal reflux disease without esophagitis 10/03/2020    Hx MRSA infection 2020    Functional gait abnormality 2019    Impaired mobility 2019    Osteoarthritis of glenohumeral joint 2019    Left ovarian cyst 2017    Depression with anxiety 07/15/2017    Anemia 2016    Ambulatory dysfunction 2016    Adjustment disorder 2013    Irritable bowel syndrome 2012    Left atrial enlargement 2012    Left ventricular hypertrophy 2012    Carpal tunnel syndrome 2012    Gout 2012    Hyperlipidemia 2012    Osteoarthritis 2012    Symptomatic menopausal or female climacteric states 2012      LOS (days): 9  Geometric Mean LOS (GMLOS) (days): 2 90  Days to GMLOS:-6 8     OBJECTIVE:  Risk of Unplanned Readmission Score: 37 41         Current admission status: Inpatient   Preferred Pharmacy:   [facility] [address] [place]  [address]  [place] [address] [facility]  Phone: [phone] Fax: [phone] [address] [place], [place] - [address]  [address]  [address] [facility] [address]  Phone: [phone] Fax: [phone]    Primary Care Provider: Daron Loja MD    Primary Insurance: MEDICARE  Secondary Insurance: AARP    DISCHARGE DETAILS:    Discharge planning discussed with[de-identified] patient and niece was called at 17:11 pm and I had to leave a message  Freedom of Choice: Yes  Comments - Freedom of Choice: pt is active [facility]  cm placed a call to [address] [place] [address] at 9:47 am and I spoke with Ginna and I was told that the pt's care giver will be there tomorrow I made them aware of a 10 am transport  for tomorrow  CM contacted family/caregiver?: Yes             Contacts  Patient Contacts: Gabo Block  Relationship to Patient[de-identified] Family (niece)  Contact Method: Phone  Phone Number: [phone]  message was left to call to discuss d/c plan  Reason/Outcome: Discharge [address]         Is the patient interested in [address] at discharge?: Yes    DME Referral Provided  Referral made for DME?: No    Other Referral/Resources/Interventions Provided:  Interventions: [address], Other (Specify)  Referral Comments: pt is active with [facility], they were made aware of the d/c for tomorrow   [facility] caregivers are aware of irene 10 am transport and d/c for tomorrow    Would you like to participate in our [address] service program?  : No - Declined    Treatment Team Recommendation: Home with [facility] (home with Caregivers & [facility]- 10am BLS)                                   IMM Given (Date):: 08/04/22  IMM Given to[de-identified] Patient

## 2022-08-05 VITALS
WEIGHT: 293 LBS | BODY MASS INDEX: 51.91 KG/M2 | RESPIRATION RATE: 20 BRPM | DIASTOLIC BLOOD PRESSURE: 62 MMHG | OXYGEN SATURATION: 92 % | HEART RATE: 73 BPM | TEMPERATURE: 97.8 F | SYSTOLIC BLOOD PRESSURE: 107 MMHG | HEIGHT: 63 IN

## 2022-08-05 LAB
INR PPP: 2.3 (ref 0.84–1.19)
PROTHROMBIN TIME: 25.2 SECONDS (ref 11.6–14.5)

## 2022-08-05 PROCEDURE — 85610 PROTHROMBIN TIME: CPT | Performed by: PHYSICIAN ASSISTANT

## 2022-08-05 PROCEDURE — 91301 COVID-19 MODERNA VACCINE BOOSTER 50 MCG/0.25ML SUSP: CPT | Performed by: INTERNAL MEDICINE

## 2022-08-05 PROCEDURE — 0011A HB IMMUNIZATION ADMIN COVID-19 100MCG/0.5ML FIRST DOSE: CPT | Performed by: INTERNAL MEDICINE

## 2022-08-05 PROCEDURE — 99238 HOSP IP/OBS DSCHRG MGMT 30/<: CPT | Performed by: PHYSICIAN ASSISTANT

## 2022-08-05 RX ADMIN — CX-024414 0.25 ML: 0.2 INJECTION, SUSPENSION INTRAMUSCULAR at 09:15

## 2022-08-05 RX ADMIN — LEVOTHYROXINE SODIUM 125 MCG: 25 TABLET ORAL at 05:42

## 2022-08-05 RX ADMIN — Medication 125 MG: at 09:13

## 2022-08-05 RX ADMIN — TRIAMCINOLONE ACETONIDE: 1 CREAM TOPICAL at 09:13

## 2022-08-05 NOTE — CASE MANAGEMENT
Case Management Discharge Planning Note    Patient name Calixto Juárez  Location [address] 224/-60 MRN [id number]  : 1950 Date 2022       Current Admission Date: 2022  Current Admission Diagnosis:Panniculitis   Patient Active Problem List    Diagnosis Date Noted    BMI 70 and over, adult ([facility] ) 2022    Chronic atrial fibrillation ([place] [address] ) 2022    History of Clostridioides difficile infection 2022    Lymphedema of extremity 2021    Other specified hypothyroidism 10/03/2020    Mild episode of recurrent major depressive disorder ([place] [address] ) 10/03/2020    Idiopathic chronic 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 10/03/2020    Primary osteoarthritis involving multiple joints 10/03/2020    Class 3 severe obesity due to excess calories with serious comorbidity and body mass index (BMI) greater than or equal to 70 in adult [facility]) 10/03/2020    Chronic diastolic congestive heart failure ([facility] ) 10/03/2020    Panniculitis 10/03/2020    Gastroesophageal reflux disease without esophagitis 10/03/2020    Hx MRSA infection 2020    Functional gait abnormality 2019    Impaired mobility 2019    Osteoarthritis of glenohumeral joint 2019    Left ovarian cyst 2017    Depression with anxiety 07/15/2017    Anemia 2016    Ambulatory dysfunction 2016    Adjustment disorder 2013    Irritable bowel syndrome 2012    Left atrial enlargement 2012    Left ventricular hypertrophy 2012    Carpal tunnel syndrome 2012    Gout 2012    Hyperlipidemia 2012    Osteoarthritis 2012    Symptomatic menopausal or female climacteric states 2012      LOS (days): 10  Geometric Mean LOS (GMLOS) (days): 2 90  Days to GMLOS:-7 4     OBJECTIVE:  Risk of Unplanned Readmission Score: 37 97         Current admission status: Inpatient   Preferred Pharmacy:    [address]  [place] [address] [facility]  Phone: [phone] Fax: [phone]    [address], [address] [facility] - [address]  [address]  [address] [facility] [address]  Phone: [phone] Fax: [phone]    Primary Care Provider: Dee Valencia MD    Primary Insurance: MEDICARE  Secondary Insurance: AAR    DISCHARGE DETAILS:    Discharge planning discussed with[de-identified] patient and niece was called at 09:02 LM on her cell phone  called and left a message on her work phone 09:03 am  Freedom of Choice: Yes  Comments - Freedom of Choice: pt active with [facility] and has caregivers from [facility] contacted family/caregiver?: Yes (message left for her niece to call cm)  Were Treatment Team discharge recommendations reviewed with patient/caregiver?: Yes (reviewed with patient)  Did patient/caregiver verbalize understanding of patient care needs?: Yes  Were patient/caregiver advised of the risks associated with not following Treatment Team discharge recommendations?: Yes    Contacts  Patient Contacts: Army Mack  Relationship to Patient[de-identified] Family (niece)  Contact Method: Phone  Phone Number: [phone] & [phone] LM  Reason/Outcome: Discharge [address]         Is the patient interested in [facility] at discharge?: Yes         Other Referral/Resources/Interventions Provided:  Interventions: [facility] AT [facility], Other (Specify)  Referral Comments: [facility] was made aware of the d/c , avs was faxed, Polo Pedroza was called at [address] [place] [address] to # [phone] and I spoke with Chantel and she stated the caregiver will be at [facility] home today at 10 am,, pt has a 10 am transport    Would you like to participate in our [address] service program?  : No - Declined    Treatment Team Recommendation: Home with [address] [facility] (home with [facility] & caregiver and outpt follow up- bls)  Discharge Destination Plan[de-identified] Home with [address] [facility] (home with Polo Pedroza caregiver, [address]  [facility]- [facility] [place]  Transport at Discharge : \[facility] [place] [facility]\"" [facility]  Dispatcher Contacted: Yes  Number/Name of Dispatcher: [phone]  Transported by [employer] and Unit #): Ayo  ETA of Transport (Date): 08/05/22  ETA of Transport (Time): 1000     Transfer Mode: Stretcher  Accompanied by: EMS personnel      pt placed a call to her pcp  Pt does receive house calls from Dr Cyndy RACHEL  Pt is in agreement with the d/c and d/c plan   Pt is in agreement with the d/c and d/c plan, pt gave verbal permission to send d/c paperwork to [facility]

## 2022-08-05 NOTE — ASSESSMENT & PLAN NOTE
· Patient received vancomycin 125 mg p o  q 12 hours for 72 hours beyond completion of the antibiotic course- this was completed prior to discharge

## 2022-08-05 NOTE — ASSESSMENT & PLAN NOTE
· Appreciate ID input  · Patient completed 10 days of antibiotics  · Abdominal ultrasound reviewed-no concern for abscesses  · Appreciate general surgical input-no acute phase intervention needed  · Patient was discharged home with home PT/OT  · Outpatient follow-up with PCP

## 2022-08-05 NOTE — ASSESSMENT & PLAN NOTE
· Patient is rate controlled at this time  · Continue pre-hospital Coumadin 2 mg alternating with 4 mg daily  · INR is therapeutic

## 2022-08-05 NOTE — DISCHARGE SUMMARY
[address]  Discharge- Lynita Habermann 1950, 80 y o  female MRN: [id number]  Unit/Bed#: -01 Encounter: [id number]  Primary Care Provider: Dillon Centeno MD   Date and time admitted to hospital: 7/25/2022  8:32 PM    * Panniculitis  Assessment & Plan  · Appreciate ID input  · Patient completed 10 days of antibiotics  · Abdominal ultrasound reviewed-no concern for abscesses  · Appreciate general surgical input-no acute phase intervention needed  · Patient was discharged home with home PT/OT  · Outpatient follow-up with PCP    Chronic atrial fibrillation ([facility] )  Assessment & Plan  · Patient is rate controlled at this time  · Continue pre-hospital Coumadin 2 mg alternating with 4 mg daily  · INR is therapeutic    History of Clostridioides difficile infection  Assessment & Plan  · Patient received vancomycin 125 mg p o  q 12 hours for 72 hours beyond completion of the antibiotic course- this was completed prior to discharge    Chronic diastolic congestive heart failure ([facility] )  Assessment & Plan  Wt Readings from Last 3 Encounters:   07/25/22 (!) 201 kg (442 lb 7 4 oz)   06/14/22 (!) 211 kg (464 lb 8 2 oz)   05/08/22 (!) 212 kg (467 lb)     · Currently without exacerbation  · Continue Coumadin, continue Lasix  · INR is therapeutic        Class 3 severe obesity due to excess calories with serious comorbidity and body mass index (BMI) greater than or equal to 70 in adult [facility])  Assessment & Plan  · Actual BMI 80 93  · Dietary, weight loss, and lifestyle modification counseling was provided    Mild episode of recurrent major depressive disorder ([facility] )  Assessment & Plan  · Continue Cymbalta    Other specified hypothyroidism  Assessment & Plan  · Continue levothyroxine      Medical Problems             Resolved Problems  Date Reviewed: 8/5/2022   None               Discharging Physician / Practitioner: Noe Arndt PA-C  PCP: Dillon Centeno MD  Admission Date: Admission Orders (From admission, onward)     Ordered        07/26/22 0054  INPATIENT ADMISSION  Once                      Discharge Date: 08/05/22    Consultations During Hospital Stay:  · General surgery  · Infectious disease    Procedures Performed:   · none    Significant Findings / Test Results:   US abdomen limited    Result Date: 7/27/2022  · Impression: Edematous subcutaneous tissues overlying the left hip without evidence of cellulitis or abscess  Workstation performed: [id number]   ·     Incidental Findings:   · none     Test Results Pending at Discharge (will require follow up):   · none     Outpatient Tests Requested:  · none    Complications:  none    Reason for Admission: panniculitis     Hospital Course:   Zoe Georges is a 80 y o  female patient who originally presented to the hospital on 7/25/2022 due to abdominal pain x 2 days  Please see H&P by Ana Batista PA-C dated 7/26/22 for complete details of presentation  The patient was started on IV Cefepime and po vancomycin on admission  ID consulted and recommended to continue cefepime, ultrasound and general surgery consult  General surgery consulted- no acute surgical intervention as ultrasound without drainable collection  The patient was then switched to PO Levaquin complete a 10 day course of antibiotics  The patient remained on PO vancomycin for Cdiff prophylaxis for 72 hours after completion of Levaquin  The patient was stable for discharge on 7/28/22 however her caregiver was unable to care for her until 8/5/22  Outpatient follow-up with PCP      Please see above list of diagnoses and related plan for additional information       Condition at Discharge: fair    Discharge Day Visit / Exam:   Subjective:    Vitals: Blood Pressure: 107/62 (08/05/22 0721)  Pulse: 73 (08/05/22 0721)  Temperature: 97 8 °F (36 6 °C) (08/05/22 0721)  Temp Source: Oral (07/31/22 0738)  Respirations: 20 (08/05/22 0721)  Height: 5' 3" (160 cm) (07/27/22 1543)  Weight - Scale: (!) 201 kg (442 lb 7 4 oz) (07/25/22 2038)  SpO2: 92 % (08/05/22 0721)  Exam:   Physical Exam  Vitals and nursing note reviewed  Constitutional:       Appearance: Normal appearance  HENT:      Head: Normocephalic and atraumatic  Cardiovascular:      Rate and Rhythm: Normal rate and regular rhythm  Pulses: Normal pulses  Heart sounds: Normal heart sounds  Pulmonary:      Effort: Pulmonary effort is normal       Breath sounds: Normal breath sounds  Abdominal:      Palpations: Abdomen is soft  Skin:     General: Skin is warm and dry  Comments: Area of erythremia left hip with mild clear liquid drainage   Neurological:      General: No focal deficit present  Mental Status: She is alert and oriented to person, place, and time  Psychiatric:         Mood and Affect: Mood normal          Behavior: Behavior normal         Discussion with Family: Patient declined call to   Discharge instructions/Information to patient and family:   See after visit summary for information provided to patient and family  Provisions for Follow-Up Care:  See after visit summary for information related to follow-up care and any pertinent home health orders  Disposition:   Home with [facility] (Reminder: Complete face to face encounter)    Planned Readmission: no     Discharge Statement:  I spent 25 minutes discharging the patient  This time was spent on the day of discharge  I had direct contact with the patient on the day of discharge  Greater than 50% of the total time was spent examining patient, answering all patient questions, arranging and discussing plan of care with patient as well as directly providing post-discharge instructions  Additional time then spent on discharge activities  Discharge Medications:  See after visit summary for reconciled discharge medications provided to patient and/or family        **Please Note: This note may have been constructed using a voice recognition system**

## 2022-08-05 NOTE — PLAN OF CARE
Problem: MOBILITY - ADULT  Goal: Maintain or return to baseline ADL function  Description: INTERVENTIONS:  -  Assess patient's ability to carry out ADLs; assess patient's baseline for ADL function and identify physical deficits which impact ability to perform ADLs (bathing, care of mouth/teeth, toileting, grooming, dressing, etc )  - Assess/evaluate cause of self-care deficits   - Assess range of motion  - Assess patient's mobility; develop plan if impaired  - Assess patient's need for assistive devices and provide as appropriate  - Encourage maximum independence but intervene and supervise when necessary  - Involve family in performance of ADLs  - Assess for home care needs following discharge   - Consider OT consult to assist with ADL evaluation and planning for discharge  - Provide patient education as appropriate  8/5/2022 0923 by Kirby Ruggiero RN  Outcome: Adequate for Discharge  8/5/2022 0741 by Kirby Ruggiero RN  Outcome: Progressing  Goal: Maintains/Returns to pre admission functional level  Description: INTERVENTIONS:  - Perform BMAT or MOVE assessment daily    - Set and communicate daily mobility goal to care team and patient/family/caregiver  - Collaborate with rehabilitation services on mobility goals if consulted  - Reposition patient every 2 hours    - - Out of bed to chair  for meals   - Out of bed for toileting  - Record patient progress and toleration of activity level   8/5/2022 0923 by Kirby Ruggiero RN  Outcome: Adequate for Discharge  8/5/2022 0741 by Kirby Ruggiero RN  Outcome: Progressing     Problem: Prexisting or High Potential for Compromised Skin Integrity  Goal: Skin integrity is maintained or improved  Description: INTERVENTIONS:  - Identify patients at risk for skin breakdown  - Assess and monitor skin integrity  - Assess and monitor nutrition and hydration status  - Monitor labs   - Assess for incontinence   - Turn and reposition patient  - Assist with mobility/ambulation  - Relieve pressure over bony prominences  - Avoid friction and shearing  - Provide appropriate hygiene as needed including keeping skin clean and dry  - Evaluate need for skin moisturizer/barrier cream  - Collaborate with interdisciplinary team   - Patient/family teaching  - Consider wound care consult   8/5/2022 0923 by Chelsy Mac RN  Outcome: Adequate for Discharge  8/5/2022 0741 by Chelsy Mac RN  Outcome: Progressing     Problem: Potential for Falls  Goal: Patient will remain free of falls  Description: INTERVENTIONS:  - Educate patient/family on patient safety including physical limitations  - Instruct patient to call for assistance with activity   - Consult OT/PT to assist with strengthening/mobility   - Keep Call bell within reach  - Keep bed low and locked with side rails adjusted as appropriate  - Keep care items and personal belongings within reach  - Initiate and maintain comfort rounds  - Make Fall Risk Sign visible to staff  - Offer Toileting every 2 Hours, in advance of need  - Initiate/Maintain alarms  - Obtain necessary fall risk management equipment:   - Apply yellow socks and bracelet for high fall risk patients  - Consider moving patient to room near nurses station  8/5/2022 0923 by Chelsy Mac RN  Outcome: Adequate for Discharge  8/5/2022 0741 by Chelsy Mac RN  Outcome: Progressing

## 2022-09-22 ENCOUNTER — HOSPITAL ENCOUNTER (INPATIENT)
Facility: HOSPITAL | Age: 72
LOS: 6 days | Discharge: NURSING, REHAB OR LONG-TERM CARE | DRG: 603 | End: 2022-09-29
Attending: EMERGENCY MEDICINE | Admitting: HOSPITALIST
Payer: MEDICARE

## 2022-09-22 DIAGNOSIS — N18.9 ACUTE KIDNEY INJURY SUPERIMPOSED ON CKD (HCC): ICD-10-CM

## 2022-09-22 DIAGNOSIS — Z74.09 IMPAIRED MOBILITY: ICD-10-CM

## 2022-09-22 DIAGNOSIS — Z86.19 HISTORY OF CLOSTRIDIOIDES DIFFICILE INFECTION: Chronic | ICD-10-CM

## 2022-09-22 DIAGNOSIS — R53.81 PHYSICAL DECONDITIONING: ICD-10-CM

## 2022-09-22 DIAGNOSIS — M25.512 ACUTE PAIN OF LEFT SHOULDER: ICD-10-CM

## 2022-09-22 DIAGNOSIS — M79.3 PANNICULITIS: ICD-10-CM

## 2022-09-22 DIAGNOSIS — L03.90 CELLULITIS: Primary | ICD-10-CM

## 2022-09-22 DIAGNOSIS — N17.9 ACUTE KIDNEY INJURY SUPERIMPOSED ON CKD (HCC): ICD-10-CM

## 2022-09-22 LAB
ALBUMIN SERPL BCP-MCNC: 3.2 G/DL (ref 3.5–5)
ALP SERPL-CCNC: 106 U/L (ref 34–104)
ALT SERPL W P-5'-P-CCNC: 13 U/L (ref 7–52)
ANION GAP SERPL CALCULATED.3IONS-SCNC: 8 MMOL/L (ref 4–13)
APTT PPP: 51 SECONDS (ref 23–37)
AST SERPL W P-5'-P-CCNC: 18 U/L (ref 13–39)
BASOPHILS # BLD AUTO: 0.04 THOUSANDS/ΜL (ref 0–0.1)
BASOPHILS NFR BLD AUTO: 0 % (ref 0–1)
BILIRUB SERPL-MCNC: 0.89 MG/DL (ref 0.2–1)
BUN SERPL-MCNC: 23 MG/DL (ref 5–25)
CALCIUM ALBUM COR SERPL-MCNC: 9.7 MG/DL (ref 8.3–10.1)
CALCIUM SERPL-MCNC: 9.1 MG/DL (ref 8.4–10.2)
CHLORIDE SERPL-SCNC: 97 MMOL/L (ref 96–108)
CO2 SERPL-SCNC: 30 MMOL/L (ref 21–32)
CREAT SERPL-MCNC: 1.11 MG/DL (ref 0.6–1.3)
EOSINOPHIL # BLD AUTO: 0.29 THOUSAND/ΜL (ref 0–0.61)
EOSINOPHIL NFR BLD AUTO: 3 % (ref 0–6)
ERYTHROCYTE [DISTWIDTH] IN BLOOD BY AUTOMATED COUNT: 17.1 % (ref 11.6–15.1)
GFR SERPL CREATININE-BSD FRML MDRD: 50 ML/MIN/1.73SQ M
GLUCOSE SERPL-MCNC: 111 MG/DL (ref 65–140)
HCT VFR BLD AUTO: 39 % (ref 34.8–46.1)
HGB BLD-MCNC: 11.7 G/DL (ref 11.5–15.4)
IMM GRANULOCYTES # BLD AUTO: 0.04 THOUSAND/UL (ref 0–0.2)
IMM GRANULOCYTES NFR BLD AUTO: 0 % (ref 0–2)
INR PPP: 2.38 (ref 0.84–1.19)
LACTATE SERPL-SCNC: 1.4 MMOL/L (ref 0.5–2)
LYMPHOCYTES # BLD AUTO: 1.33 THOUSANDS/ΜL (ref 0.6–4.47)
LYMPHOCYTES NFR BLD AUTO: 13 % (ref 14–44)
MCH RBC QN AUTO: 25.5 PG (ref 26.8–34.3)
MCHC RBC AUTO-ENTMCNC: 30 G/DL (ref 31.4–37.4)
MCV RBC AUTO: 85 FL (ref 82–98)
MONOCYTES # BLD AUTO: 0.55 THOUSAND/ΜL (ref 0.17–1.22)
MONOCYTES NFR BLD AUTO: 6 % (ref 4–12)
NEUTROPHILS # BLD AUTO: 7.66 THOUSANDS/ΜL (ref 1.85–7.62)
NEUTS SEG NFR BLD AUTO: 78 % (ref 43–75)
NRBC BLD AUTO-RTO: 0 /100 WBCS
PLATELET # BLD AUTO: 420 THOUSANDS/UL (ref 149–390)
PMV BLD AUTO: 8.5 FL (ref 8.9–12.7)
POTASSIUM SERPL-SCNC: 3.9 MMOL/L (ref 3.5–5.3)
PROCALCITONIN SERPL-MCNC: 0.07 NG/ML
PROT SERPL-MCNC: 7.8 G/DL (ref 6.4–8.4)
PROTHROMBIN TIME: 25.9 SECONDS (ref 11.6–14.5)
RBC # BLD AUTO: 4.58 MILLION/UL (ref 3.81–5.12)
SODIUM SERPL-SCNC: 135 MMOL/L (ref 135–147)
WBC # BLD AUTO: 9.91 THOUSAND/UL (ref 4.31–10.16)

## 2022-09-22 PROCEDURE — 84145 PROCALCITONIN (PCT): CPT | Performed by: EMERGENCY MEDICINE

## 2022-09-22 PROCEDURE — 87040 BLOOD CULTURE FOR BACTERIA: CPT | Performed by: EMERGENCY MEDICINE

## 2022-09-22 PROCEDURE — 36415 COLL VENOUS BLD VENIPUNCTURE: CPT | Performed by: EMERGENCY MEDICINE

## 2022-09-22 PROCEDURE — 99285 EMERGENCY DEPT VISIT HI MDM: CPT

## 2022-09-22 PROCEDURE — 85610 PROTHROMBIN TIME: CPT | Performed by: EMERGENCY MEDICINE

## 2022-09-22 PROCEDURE — 83605 ASSAY OF LACTIC ACID: CPT | Performed by: EMERGENCY MEDICINE

## 2022-09-22 PROCEDURE — 85730 THROMBOPLASTIN TIME PARTIAL: CPT | Performed by: EMERGENCY MEDICINE

## 2022-09-22 PROCEDURE — 80053 COMPREHEN METABOLIC PANEL: CPT | Performed by: EMERGENCY MEDICINE

## 2022-09-22 PROCEDURE — 99284 EMERGENCY DEPT VISIT MOD MDM: CPT | Performed by: EMERGENCY MEDICINE

## 2022-09-22 PROCEDURE — 85025 COMPLETE CBC W/AUTO DIFF WBC: CPT | Performed by: EMERGENCY MEDICINE

## 2022-09-22 RX ORDER — ACETAMINOPHEN 325 MG/1
650 TABLET ORAL EVERY 8 HOURS PRN
Status: DISCONTINUED | OUTPATIENT
Start: 2022-09-22 | End: 2022-09-29 | Stop reason: HOSPADM

## 2022-09-22 RX ORDER — OXYCODONE HYDROCHLORIDE 5 MG/1
5 TABLET ORAL EVERY 6 HOURS PRN
Status: DISCONTINUED | OUTPATIENT
Start: 2022-09-22 | End: 2022-09-29 | Stop reason: HOSPADM

## 2022-09-22 RX ORDER — WARFARIN SODIUM 2 MG/1
2 TABLET ORAL
Status: DISCONTINUED | OUTPATIENT
Start: 2022-09-23 | End: 2022-09-29 | Stop reason: HOSPADM

## 2022-09-22 RX ORDER — ALLOPURINOL 100 MG/1
100 TABLET ORAL DAILY
Status: DISCONTINUED | OUTPATIENT
Start: 2022-09-23 | End: 2022-09-29 | Stop reason: HOSPADM

## 2022-09-22 RX ORDER — ONDANSETRON 2 MG/ML
4 INJECTION INTRAMUSCULAR; INTRAVENOUS ONCE
Status: COMPLETED | OUTPATIENT
Start: 2022-09-22 | End: 2022-09-23

## 2022-09-22 RX ORDER — WARFARIN SODIUM 2 MG/1
4 TABLET ORAL
Status: DISCONTINUED | OUTPATIENT
Start: 2022-09-24 | End: 2022-09-29 | Stop reason: HOSPADM

## 2022-09-22 RX ORDER — SACCHAROMYCES BOULARDII 250 MG
250 CAPSULE ORAL 2 TIMES DAILY
Status: DISCONTINUED | OUTPATIENT
Start: 2022-09-23 | End: 2022-09-29 | Stop reason: HOSPADM

## 2022-09-22 RX ORDER — LEVOFLOXACIN 750 MG/1
750 TABLET ORAL EVERY 24 HOURS
Status: DISCONTINUED | OUTPATIENT
Start: 2022-09-22 | End: 2022-09-26

## 2022-09-22 RX ORDER — DULOXETIN HYDROCHLORIDE 20 MG/1
20 CAPSULE, DELAYED RELEASE ORAL DAILY
Status: DISCONTINUED | OUTPATIENT
Start: 2022-09-23 | End: 2022-09-29 | Stop reason: HOSPADM

## 2022-09-22 RX ORDER — FUROSEMIDE 40 MG/1
40 TABLET ORAL DAILY
Status: DISCONTINUED | OUTPATIENT
Start: 2022-09-23 | End: 2022-09-24

## 2022-09-22 RX ADMIN — LEVOFLOXACIN 750 MG: 750 TABLET, FILM COATED ORAL at 19:01

## 2022-09-22 NOTE — ED PROVIDER NOTES
History  Chief Complaint   Patient presents with    Cellulitis     Left hip      Nausea     Patient is a 71-year-old female with history of morbid obesity, chronic lymphedema, panniculitis, who presents for evaluation of cellulitis and nausea  Patient says that she has been having low-grade fevers and nausea over the last 2-3 days  She claims that the visiting nurse came yesterday and says that her wound on her left hip did not look good    She admits to some mild drainage from the wound 3 days ago which has since stopped  She denies any vomiting  She denies any abdominal pain, chest pain, shortness of breath  The patient's vitals are within normal limits  On exam, she has some redness to the left hip with thickening of the skin which is chronic in nature  There is no drainage from the area  There is no appreciable abscess on bedside ultrasound  Prior to Admission Medications   Prescriptions Last Dose Informant Patient Reported? Taking? DULoxetine (CYMBALTA) 20 mg capsule  Self No No   Sig: Take 1 capsule (20 mg total) by mouth daily   Neomycin-Bacitracin-Polymyxin (HCA TRIPLE ANTIBIOTIC OINTMENT EX)  Self Yes No   Sig: Apply topically To abdominal fold twice per day     Probiotic Product (PROBIOTIC DAILY PO)  Self Yes No   Sig: Take 1 each by mouth in the morning   acetaminophen (TYLENOL) 650 mg CR tablet  Self Yes No   Sig: Take 650 mg by mouth every 8 (eight) hours as needed (for osteoarthritis)   allopurinol (ZYLOPRIM) 100 mg tablet  Self No No   Sig: Take 1 tablet (100 mg total) by mouth daily   furosemide (LASIX) 40 mg tablet   Yes No   Sig: Take 40 mg by mouth daily Hold for SBP < 100   levothyroxine 125 mcg tablet  Self Yes No   Sig: Take 125 mcg by mouth daily   oxyCODONE (ROXICODONE) 5 immediate release tablet   No No   Sig: Take 1 tablet (5 mg total) by mouth every 6 (six) hours as needed for moderate pain or severe pain for up to 5 doses Max Daily Amount: 20 mg   triamcinolone (KENALOG) 0 1 % cream  Self Yes No   Sig: Apply 0 1 application topically 2 (two) times a day   warfarin (COUMADIN) 2 mg tablet   No No   Sig: Take 1 tablet (2 mg total) by mouth every other day Monday, Wednesday, Friday, sunday   warfarin (COUMADIN) 4 mg tablet  Self Yes No   Sig: Take 4 mg by mouth daily Take 1 tablet by mouth every Tuesday, Thursday, and Saturday      Facility-Administered Medications: None       Past Medical History:   Diagnosis Date    Atrial fibrillation ([place] [address] )     Bladder stone     C  difficile colitis     Cellulitis     CHF (congestive heart failure) ([place] [address] )     Depression with anxiety     Disease of thyroid gland     Diverticulitis     Enterocolitis     History of abnormal cervical Pap smear     Kidney stone     Lymphedema     Menopause     Age 53    Morbid obesity with BMI of 70 and over, adult ([place] [address] )     MRSA (methicillin resistant Staphylococcus aureus)     abdominal wound    Osteoarthritis     Phlebitis     left lower leg    Spondylolysis        Past Surgical History:   Procedure Laterality Date    APPENDECTOMY      CARPAL TUNNEL RELEASE      CHOLECYSTECTOMY      CYSTOSCOPY      stent    FOOT SURGERY  1982    bone spur    KNEE SURGERY      WISDOM TOOTH EXTRACTION         Family History   Problem Relation Age of Onset    Heart failure Mother     Heart disease Mother     Lymphoma Mother     Seizures Mother     Kidney disease Father     Heart disease Father     Heart failure Father     Diabetes type II Father     Diabetes type II Sister     Diabetes type II Brother     Uterine cancer Paternal Aunt     Breast cancer Neg Hx     Colon cancer Neg Hx     Ovarian cancer Neg Hx      I have reviewed and agree with the history as documented      E-Cigarette/Vaping    E-Cigarette Use Never User      E-Cigarette/Vaping Substances    Nicotine No     THC No     CBD No     Flavoring No     Other No     Unknown No      Social History     Tobacco Use    Smoking status: Former Smoker     Packs/day: 5 00     Years: 3 00     Pack years: 15 00     Start date: 1967     Quit date: 1970     Years since quittin 2    Smokeless tobacco: Never Used    Tobacco comment: 5 packs/day until 6 (age 17-19) - As per [employer]    Vaping Use    Vaping Use: Never used   Substance Use Topics    Alcohol use: Not Currently    Drug use: Not Currently     Types: Marijuana     Comment: As a teen - As per [employer]        Review of Systems   Constitutional: Negative for fever and unexpected weight change  HENT: Negative for congestion, ear pain, sore throat and trouble swallowing  Eyes: Negative for pain and redness  Respiratory: Negative for cough, chest tightness and shortness of breath  Cardiovascular: Negative for chest pain and leg swelling  Gastrointestinal: Positive for nausea  Negative for abdominal distention, abdominal pain, diarrhea and vomiting  Endocrine: Negative for polyuria  Genitourinary: Negative for dysuria, hematuria, pelvic pain and vaginal bleeding  Musculoskeletal: Negative for back pain and myalgias  Skin: Positive for color change ( left hip)  Negative for rash and wound (Left hip)  Neurological: Negative for dizziness, syncope, weakness, light-headedness and headaches  Physical Exam  Physical Exam  Vitals and nursing note reviewed  Constitutional:       General: She is not in acute distress  Appearance: She is well-developed  HENT:      Head: Normocephalic and atraumatic  Right Ear: External ear normal       Left Ear: External ear normal       Nose: Nose normal       Mouth/Throat:      Mouth: Mucous membranes are moist       Pharynx: No oropharyngeal exudate  Eyes:      Conjunctiva/sclera: Conjunctivae normal       Pupils: Pupils are equal, round, and reactive to light  Cardiovascular:      Rate and Rhythm: Normal rate and regular rhythm  Heart sounds: Normal heart sounds  No murmur heard      No friction rub  No gallop  Pulmonary:      Effort: Pulmonary effort is normal  No respiratory distress  Breath sounds: Normal breath sounds  No wheezing or rales  Abdominal:      General: There is no distension  Palpations: Abdomen is soft  Tenderness: There is no abdominal tenderness  There is no guarding  Musculoskeletal:         General: No swelling, tenderness or deformity  Normal range of motion  Cervical back: Normal range of motion and neck supple  Lymphadenopathy:      Cervical: No cervical adenopathy  Skin:     General: Skin is warm and dry  Comments: Redness to left upper leg  No abscess appreciated, no drainage   Neurological:      General: No focal deficit present  Mental Status: She is alert and oriented to person, place, and time  Mental status is at baseline  Cranial Nerves: No cranial nerve deficit  Sensory: No sensory deficit  Motor: No weakness or abnormal muscle tone        Coordination: Coordination normal          Vital Signs  ED Triage Vitals [09/22/22 1740]   Temperature Pulse Respirations Blood Pressure SpO2   97 7 °F (36 5 °C) 67 18 120/56 96 %      Temp Source Heart Rate Source Patient Position - Orthostatic VS BP Location FiO2 (%)   Oral Monitor Lying Left arm --      Pain Score       8           Vitals:    09/22/22 1740 09/22/22 2030   BP: 120/56 118/55   Pulse: 67 65   Patient Position - Orthostatic VS: Lying          Visual Acuity      ED Medications  Medications   levofloxacin (LEVAQUIN) tablet 750 mg (750 mg Oral Given 9/22/22 1901)   ondansetron (ZOFRAN) injection 4 mg (0 mg Intravenous Hold 9/22/22 2123)   allopurinol (ZYLOPRIM) tablet 100 mg (has no administration in time range)   DULoxetine (CYMBALTA) delayed release capsule 20 mg (has no administration in time range)   furosemide (LASIX) tablet 40 mg (has no administration in time range)   levothyroxine tablet 125 mcg (has no administration in time range)   oxyCODONE (ROXICODONE) IR tablet 5 mg (has no administration in time range)   warfarin (COUMADIN) tablet 2 mg (has no administration in time range)   warfarin (COUMADIN) tablet 4 mg (has no administration in time range)   saccharomyces boulardii (FLORASTOR) capsule 250 mg (has no administration in time range)   acetaminophen (TYLENOL) tablet 650 mg (has no administration in time range)   vancomycin (VANCOCIN) oral solution 125 mg (has no administration in time range)       Diagnostic Studies  Results Reviewed     Procedure Component Value Units Date/Time    Protime-INR [[id number]]     Lab Status: No result Specimen: Blood     Procalcitonin [[id number]]  (Normal) Collected: 09/22/22 1809    Lab Status: Final result Specimen: Blood from Arm, Right Updated: 09/22/22 1841     Procalcitonin 0 07 ng/ml     Protime-INR [[id number]]  (Abnormal) Collected: 09/22/22 1809    Lab Status: Final result Specimen: Blood from Arm, Right Updated: 09/22/22 1835     Protime 25 9 seconds      INR 2 38    APTT [[id number]]  (Abnormal) Collected: 09/22/22 1809    Lab Status: Final result Specimen: Blood from Arm, Right Updated: 09/22/22 1835     PTT 51 seconds     Lactic acid [[id number]]  (Normal) Collected: 09/22/22 1809    Lab Status: Final result Specimen: Blood from Arm, Right Updated: 09/22/22 1832     LACTIC ACID 1 4 mmol/L     Narrative:      Result may be elevated if tourniquet was used during collection      Comprehensive metabolic panel [[id number]]  (Abnormal) Collected: 09/22/22 1809    Lab Status: Final result Specimen: Blood from Arm, Left Updated: 09/22/22 1832     Sodium 135 mmol/L      Potassium 3 9 mmol/L      Chloride 97 mmol/L      CO2 30 mmol/L      ANION GAP 8 mmol/L      BUN 23 mg/dL      Creatinine 1 11 mg/dL      Glucose 111 mg/dL      Calcium 9 1 mg/dL      Corrected Calcium 9 7 mg/dL      AST 18 U/L      ALT 13 U/L      Alkaline Phosphatase 106 U/L      Total Protein 7 8 g/dL      Albumin 3 2 g/dL      Total Bilirubin 0 89 mg/dL      eGFR 50 ml/min/1 73sq m     Narrative:      National Kidney Disease Foundation guidelines for Chronic Kidney Disease (CKD):     Stage 1 with normal or high GFR (GFR > 90 mL/min/1 73 square meters)    Stage 2 Mild CKD (GFR = 60-89 mL/min/1 73 square meters)    Stage 3A Moderate CKD (GFR = 45-59 mL/min/1 73 square meters)    Stage 3B Moderate CKD (GFR = 30-44 mL/min/1 73 square meters)    Stage 4 Severe CKD (GFR = 15-29 mL/min/1 73 square meters)    Stage 5 End Stage CKD (GFR <15 mL/min/1 73 square meters)  Note: GFR calculation is accurate only with a steady state creatinine    CBC and differential [[id number]]  (Abnormal) Collected: 09/22/22 1809    Lab Status: Final result Specimen: Blood from Arm, Right Updated: 09/22/22 1816     WBC 9 91 Thousand/uL      RBC 4 58 Million/uL      Hemoglobin 11 7 g/dL      Hematocrit 39 0 %      MCV 85 fL      MCH 25 5 pg      MCHC 30 0 g/dL      RDW 17 1 %      MPV 8 5 fL      Platelets 886 Thousands/uL      nRBC 0 /100 WBCs      Neutrophils Relative 78 %      Immat GRANS % 0 %      Lymphocytes Relative 13 %      Monocytes Relative 6 %      Eosinophils Relative 3 %      Basophils Relative 0 %      Neutrophils Absolute 7 66 Thousands/µL      Immature Grans Absolute 0 04 Thousand/uL      Lymphocytes Absolute 1 33 Thousands/µL      Monocytes Absolute 0 55 Thousand/µL      Eosinophils Absolute 0 29 Thousand/µL      Basophils Absolute 0 04 Thousands/µL     Blood culture #1 [[id number]] Collected: 09/22/22 1809    Lab Status: In process Specimen: Blood from Arm, Right Updated: 09/22/22 1813    Blood culture #2 [[id number]] Collected: 09/22/22 1809    Lab Status: In process Specimen: Blood from Arm, Left Updated: 09/22/22 1813                 No orders to display              Procedures  Procedures         ED Course  ED Course as of 09/22/22 2328   Thu Sep 22, 2022   1904 Patient is medically cleared at this time given normal lab work and vitals    Patient requires placement in facility as she is not able to care for herself and her care giver will be out for surgery  Will hold in the ED overnight and have Case management work on placement tomorrow  MDM  Number of Diagnoses or Management Options  Diagnosis management comments: 71-year-old female with history of obesity, chronic lymphedema, panniculitis, presenting for evaluation of nausea, left upper leg redness  She claims that her visiting nurse said that the leg looked worse  No fevers at home  On exam, mild redness to the left leg, no drainage  No abscess on bedside ultrasound  Vitals within normal limits  Will obtain septic workup  Labs within normal limits including white count, lactic acid, procal     Will treat with oral Levaquin as old cultures have grown out Pseudomonas and  patient has been on that in the past with success  Do not believe patient requires hospital admission at this time  Will hold the ED for placement as patient is not able to care for herself at home      Disposition  Final diagnoses:   None     ED Disposition     None      Follow-up Information    None         Patient's Medications   Discharge Prescriptions    No medications on file       No discharge procedures on file      PDMP Review       Value Time User    PDMP Reviewed  Yes 5/27/2022 11:10 PM Anjleica Juarez PA-C          ED Provider  Electronically Signed by           Bambi Lopez DO  09/22/22 3688

## 2022-09-23 PROBLEM — E66.813 CLASS 3 SEVERE OBESITY DUE TO EXCESS CALORIES WITH SERIOUS COMORBIDITY AND BODY MASS INDEX (BMI) GREATER THAN OR EQUAL TO 70 IN ADULT (HCC): Status: RESOLVED | Noted: 2020-10-03 | Resolved: 2022-09-23

## 2022-09-23 PROBLEM — E66.01 CLASS 3 SEVERE OBESITY DUE TO EXCESS CALORIES WITH SERIOUS COMORBIDITY AND BODY MASS INDEX (BMI) GREATER THAN OR EQUAL TO 70 IN ADULT (HCC): Status: RESOLVED | Noted: 2020-10-03 | Resolved: 2022-09-23

## 2022-09-23 PROBLEM — Z86.19 HISTORY OF CLOSTRIDIOIDES DIFFICILE INFECTION: Chronic | Status: ACTIVE | Noted: 2022-01-08

## 2022-09-23 LAB
INR PPP: 2.37 (ref 0.84–1.19)
PROTHROMBIN TIME: 25.8 SECONDS (ref 11.6–14.5)

## 2022-09-23 PROCEDURE — 85610 PROTHROMBIN TIME: CPT | Performed by: PHYSICIAN ASSISTANT

## 2022-09-23 PROCEDURE — 99223 1ST HOSP IP/OBS HIGH 75: CPT | Performed by: HOSPITALIST

## 2022-09-23 PROCEDURE — 36415 COLL VENOUS BLD VENIPUNCTURE: CPT | Performed by: PHYSICIAN ASSISTANT

## 2022-09-23 PROCEDURE — 96374 THER/PROPH/DIAG INJ IV PUSH: CPT

## 2022-09-23 RX ORDER — VANCOMYCIN HYDROCHLORIDE 125 MG/1
125 CAPSULE ORAL EVERY 12 HOURS
Status: DISCONTINUED | OUTPATIENT
Start: 2022-09-23 | End: 2022-09-29 | Stop reason: HOSPADM

## 2022-09-23 RX ORDER — ONDANSETRON 2 MG/ML
4 INJECTION INTRAMUSCULAR; INTRAVENOUS EVERY 6 HOURS PRN
Status: DISCONTINUED | OUTPATIENT
Start: 2022-09-23 | End: 2022-09-29 | Stop reason: HOSPADM

## 2022-09-23 RX ADMIN — Medication 250 MG: at 17:38

## 2022-09-23 RX ADMIN — VANCOMYCIN HYDROCHLORIDE 125 MG: 125 CAPSULE ORAL at 00:24

## 2022-09-23 RX ADMIN — ALLOPURINOL 100 MG: 100 TABLET ORAL at 09:31

## 2022-09-23 RX ADMIN — VANCOMYCIN HYDROCHLORIDE 125 MG: 125 CAPSULE ORAL at 14:35

## 2022-09-23 RX ADMIN — ACETAMINOPHEN 650 MG: 325 TABLET ORAL at 00:23

## 2022-09-23 RX ADMIN — WARFARIN SODIUM 2 MG: 2 TABLET ORAL at 17:37

## 2022-09-23 RX ADMIN — Medication 250 MG: at 09:31

## 2022-09-23 RX ADMIN — LEVOFLOXACIN 750 MG: 750 TABLET, FILM COATED ORAL at 21:56

## 2022-09-23 RX ADMIN — LEVOTHYROXINE SODIUM 125 MCG: 25 TABLET ORAL at 06:29

## 2022-09-23 RX ADMIN — DULOXETINE 20 MG: 20 CAPSULE, DELAYED RELEASE ORAL at 09:31

## 2022-09-23 RX ADMIN — OXYCODONE HYDROCHLORIDE 5 MG: 5 TABLET ORAL at 00:24

## 2022-09-23 RX ADMIN — FUROSEMIDE 40 MG: 40 TABLET ORAL at 09:31

## 2022-09-23 RX ADMIN — ONDANSETRON 4 MG: 2 INJECTION INTRAMUSCULAR; INTRAVENOUS at 00:19

## 2022-09-23 NOTE — ED CARE HANDOFF
Emergency Department Sign Out Note        Sign out and transfer of care from [address]  See Separate Emergency Department note  The patient, Bhaskar Alvarenga, was evaluated by the previous provider for placement     Workup Completed:  Labs     ED Course / Workup Pending (followup):  pt is stable  , unable to place her at this time per case management discussed with the slim will admit  ED Course as of 09/23/22 1423   Fri Sep 23, 2022   8550 78yo f came with left thigh cellulitis , no abscess, home aid is on vacation,  no one is home to take care pending placement    1412 Patient will not be placed until Monday discussed with slim team will admit  Procedures  MDM        Disposition  Final diagnoses:   Cellulitis   Physical deconditioning     Time reflects when diagnosis was documented in both MDM as applicable and the Disposition within this note     Time User Action Codes Description Comment    9/23/2022  1:48 AM Check, Laveta Click Add [L03 90] Cellulitis     9/23/2022  2:16 PM Luciano Quiles Add [R53 81] Physical deconditioning       ED Disposition     ED Disposition   Admit    Condition   Stable    Date/Time   Fri Sep 23, 2022  2:16 PM    Comment   Case was discussed with Minna Barone and the patient's admission status was agreed to be Admission Status: inpatient status to the service of Dr Sonny Crowley               Follow-up Information     Follow up With Specialties Details Why Contact Info Additional [address] [place]  Emergency Department Emergency Medicine  If symptoms worsen [address] [place] [id number]  [facility] Emergency Department, [address], [address] [facility], [address]    Campos Day MD Nephrology, Internal Medicine Schedule an appointment as soon as possible for a visit   [address] [place] [address] [facility] [address] [phone]           Patient's Medications   Discharge Prescriptions    No medications on file     No discharge procedures on file         ED Provider  Electronically Signed by     Genevieve Dyson MD  09/23/22 1027

## 2022-09-23 NOTE — ASSESSMENT & PLAN NOTE
· Continue Coumadin for anticoagulation purposes  · Will check an INR in the a m    · Rate is controlled

## 2022-09-23 NOTE — H&P
[address] [address]  H&P- [facility] 1950, 71 y o  female MRN: [id number]  Unit/Bed#: -[place] Encounter: [id number]  Primary Care Provider: Tk Jimenez MD   Date and time admitted to hospital: 9/22/2022  5:31 PM    * Panniculitis  Assessment & Plan  · Left pannus/hip/thigh cellulitis - recurrent  · Continue IV Levaquin started the ED  · Will follow-up on the blood culture  · Will consider an ID evaluation  · Consult general surgery  · Of the patient reports having an outpatient evaluation set up Plastic surgery October 5, 2022 but is concerned she may miss it since here caregiver is unavailable to take care of her     History of Clostridioides difficile infection  Assessment & Plan  · Continue oral vancomycin for C diff prophylaxis 125 mg p o  q 12 hours    Chronic diastolic congestive heart failure (HCC)  Assessment & Plan  Wt Readings from Last 3 Encounters:   09/22/22 (!) 206 kg (454 lb 2 4 oz)   07/25/22 (!) 201 kg (442 lb 7 4 oz)   06/14/22 (!) 211 kg (464 lb 8 2 oz)     · Clinically without exacerbation  · Continue Lasix 40 mg daily by mouth  · Continue Coumadin for anticoagulation purposes  · Monitor daily weights, input and output      Chronic atrial fibrillation (HCC)  Assessment & Plan  · Continue Coumadin for anticoagulation purposes  · Will check an INR in the a m    · Rate is controlled    Other specified hypothyroidism  Assessment & Plan  · Continue Levothyroxine    BMI 70 and over, adult ([address] )  Assessment & Plan  · BMI 80 45  · Healthy diet recommended  · Consider referral to Bariatric Services post discharge    Depression with anxiety  Assessment & Plan  · Continue Cymbalta    Ambulatory dysfunction  Assessment & Plan  · PT OT case management for discharge planning  · Patient reports that her caregiver is unavailable to take care of her  · Case management is working on placement    VTE Pharmacologic Prophylaxis: VTE Score: 7 High Risk (Score >/= 5) - Pharmacological DVT Prophylaxis Ordered: warfarin (Coumadin)  Sequential Compression Devices Ordered  Code Status: Level 1 - Full Code   Discussion with patient    Anticipated Length of Stay: Patient will be admitted on an inpatient basis with an anticipated length of stay of greater than 2 midnights secondary to IV abx, CM for placement  Chief Complaint:  Cellulitis left hip and nausea    History of Present Illness:  Zoe Georges is a 71 y o  female with a PMH of atrial fibrillation on Coumadin, diastolic CHF, super morbid obesity, MRSA infection, C diff colitis and completed a prolonged vanco treatment who presents with cellulitis left hip and nausea  Patient with multiple hospitalizations for recurrent hip cellulitis and presented to the ED on 09/22 reporting low-grade fever for the last 2-3 days with nausea  She reported that the visiting nurse who follows her left wound on her hip stated it did not look good she has been having some mild drainage  Initially, the ER was ready to discharge the patient home, however due to the unavailability of her caregiver, a case management consultation was requested, however due to the patient being a difficult placement, she is being admitted to Medicine  Currently, the patient has been seen and examined in room 212  She is resting in bed  Feels a little nauseous  Has some generalized nonspecific aches and pains which she attributes to the mattress that she is lying on  But otherwise denies any chest pain, vomiting, fevers, admits to chills, but denies shortness of breath, numbness, tingling, but endorses weakness      Review of Systems:  Review of Systems    Past Medical and Surgical History:   Past Medical History:   Diagnosis Date    Atrial fibrillation ([facility] )     Bladder stone     C  difficile colitis     Cellulitis     [facility]     Depression with anxiety     Disease of thyroid gland     Diverticulitis     Enterocolitis     History of abnormal cervical Pap smear     Kidney stone     Lymphedema     Menopause     Age 53    Morbid obesity with BMI of 70 and over, adult ([facility]     MRSA (methicillin resistant Staphylococcus aureus)     abdominal wound    Osteoarthritis     Phlebitis     left lower leg    Spondylolysis        Past Surgical History:   Procedure Laterality Date    APPENDECTOMY      CARPAL TUNNEL RELEASE      CHOLECYSTECTOMY      CYSTOSCOPY      stent    FOOT SURGERY  1982    bone spur    KNEE SURGERY      WISDOM TOOTH EXTRACTION         Meds/Allergies:  Prior to Admission medications    Medication Sig Start Date End Date Taking? Authorizing Provider   acetaminophen (TYLENOL) 650 mg CR tablet Take 650 mg by mouth every 8 (eight) hours as needed (for osteoarthritis)    Historical Provider, MD   allopurinol (ZYLOPRIM) 100 mg tablet Take 1 tablet (100 mg total) by mouth daily 9/30/19   Mitzy Huynh, DO   DULoxetine (CYMBALTA) 20 mg capsule Take 1 capsule (20 mg total) by mouth daily 8/15/18   Mitzy Huynh, DO   furosemide (LASIX) 40 mg tablet Take 40 mg by mouth daily Hold for SBP < 100    Historical Provider, MD   levothyroxine 125 mcg tablet Take 125 mcg by mouth daily    Historical Provider, MD   Neomycin-Bacitracin-Polymyxin (HCA TRIPLE ANTIBIOTIC OINTMENT EX) Apply topically To abdominal fold twice per day      Historical Provider, MD   oxyCODONE (ROXICODONE) 5 immediate release tablet Take 1 tablet (5 mg total) by mouth every 6 (six) hours as needed for moderate pain or severe pain for up to 5 doses Max Daily Amount: 20 mg 6/14/22   ORACIO Armendariz   Probiotic Product (PROBIOTIC DAILY PO) Take 1 each by mouth in the morning    Historical Provider, MD   triamcinolone (KENALOG) 0 1 % cream Apply 0 1 application topically 2 (two) times a day 12/16/21   Historical Provider, MD   warfarin (COUMADIN) 2 mg tablet Take 1 tablet (2 mg total) by mouth every other day Monday, Wednesday, Friday, 22   Patsie Brittle, CRNP   warfarin (COUMADIN) 4 mg tablet Take 4 mg by mouth daily Take 1 tablet by mouth every Tuesday, Thursday, and Saturday    Historical Provider, MD   nystatin (MYCOSTATIN) powder Apply topically 2 (two) times a day 21  Ki Amato MD     I have reviewed home medications with patient personally  Allergies:    Allergies   Allergen Reactions    Augmentin Es-600 [Amoxicillin-Pot Clavulanate] Anaphylaxis and Rash    Bactrim [Sulfamethoxazole-Trimethoprim] Anaphylaxis    Penicillins Anaphylaxis and Rash     Tolerates cefepime (21)    Omeprazole GI Intolerance    Sulfa Antibiotics Hives    Latex Rash and Edema    Vitamin C [Ascorbate - Food Allergy] Rash       Social History:  Marital Status: Single   Occupation:  Retired  Patient Pre-hospital Living Situation: Home  Patient Pre-hospital Level of Mobility: non-ambulatory/bed bound  Patient Pre-hospital Diet Restrictions:  None  Substance Use History:   Social History     Substance and Sexual Activity   Alcohol Use Not Currently     Social History     Tobacco Use   Smoking Status Former Smoker    Packs/day: 5 00    Years: 3 00    Pack years: 15 00    Start date: 1967   Paulina Mancera Quit date: 1970    Years since quittin 2   Smokeless Tobacco Never Used   Tobacco Comment    5 packs/day until 6 (age [age]) - As per [employer]      Social History     Substance and Sexual Activity   Drug Use Not Currently    Types: Marijuana    Comment: As a teen - As per [employer]        Family History:  Family History   Problem Relation Age of Onset    Heart failure Mother     Heart disease Mother     Lymphoma Mother     Seizures Mother     Kidney disease Father     Heart disease Father     Heart failure Father     Diabetes type II Father     Diabetes type II Sister     Diabetes type II Brother     Uterine cancer Paternal Aunt     Breast cancer Neg Hx     Colon cancer Neg Hx     Ovarian cancer Neg Hx Physical Exam:     Vitals:   Blood Pressure: 113/65 (09/23/22 0937)  Pulse: 79 (09/23/22 0937)  Temperature: 97 6 °F (36 4 °C) (09/23/22 0937)  Temp Source: Oral (09/23/22 0937)  Respirations: (!) 96 (09/23/22 0937)  Height: 5' 3" (160 cm) (09/22/22 1740)  Weight - Scale: (!) 206 kg (454 lb 2 4 oz) (09/22/22 1740)  SpO2: 94 % (09/23/22 3449)    Physical Exam  Vitals reviewed  Constitutional:       Appearance: Normal appearance  HENT:      Head: Normocephalic and atraumatic  Nose: Nose normal    Eyes:      General:         Right eye: No discharge  Left eye: No discharge  Extraocular Movements: Extraocular movements intact  Conjunctiva/sclera: Conjunctivae normal    Cardiovascular:      Rate and Rhythm: Normal rate and regular rhythm  Pulmonary:      Effort: Pulmonary effort is normal  No respiratory distress  Breath sounds: Normal breath sounds  No wheezing  Comments: Difficult examined view of body habitus, decreased air entry bilaterally at the base  Abdominal:      General: Bowel sounds are normal  There is no distension  Palpations: Abdomen is soft  Tenderness: There is no abdominal tenderness  There is no guarding  Comments: Morbid obesity  Large pannus/panniculitis bilaterally   Musculoskeletal:         General: No swelling or tenderness  Normal range of motion  Cervical back: Normal range of motion  Right lower leg: No edema  Left lower leg: No edema  Comments: 3+ bilateral lower extremity chronic lymphedema   Skin:     General: Skin is warm and dry  Capillary Refill: Capillary refill takes less than 2 seconds  Comments: Bilateral lower extremity venous stasis changes   Neurological:      General: No focal deficit present  Mental Status: She is alert and oriented to person, place, and time  Mental status is at baseline     Psychiatric:         Mood and Affect: Mood normal          Behavior: Behavior normal  Thought Content: Thought content normal          Judgment: Judgment normal           Additional Data:     Lab Results:  Results from last 7 days   Lab Units 09/22/22  1809   WBC Thousand/uL 9 91   HEMOGLOBIN g/dL 11 7   HEMATOCRIT % 39 0   PLATELETS Thousands/uL 420*   NEUTROS PCT % 78*   LYMPHS PCT % 13*   MONOS PCT % 6   EOS PCT % 3     Results from last 7 days   Lab Units 09/22/22  1809   SODIUM mmol/L 135   POTASSIUM mmol/L 3 9   CHLORIDE mmol/L 97   CO2 mmol/L 30   BUN mg/dL 23   CREATININE mg/dL 1 11   ANION GAP mmol/L 8   CALCIUM mg/dL 9 1   ALBUMIN g/dL 3 2*   TOTAL BILIRUBIN mg/dL 0 89   ALK PHOS U/L 106*   ALT U/L 13   AST U/L 18   GLUCOSE RANDOM mg/dL 111     Results from last 7 days   Lab Units 09/23/22  0019   INR  2 37*             Results from last 7 days   Lab Units 09/22/22  1809   LACTIC ACID mmol/L 1 4   PROCALCITONIN ng/ml 0 07       ** Please Note: This note has been constructed using a voice recognition system   **

## 2022-09-23 NOTE — ASSESSMENT & PLAN NOTE
· PT OT case management for discharge planning  · Patient reports that her caregiver is unavailable to take care of her  · Case management is working on placement

## 2022-09-23 NOTE — ASSESSMENT & PLAN NOTE
· Left pannus/hip/thigh cellulitis - recurrent  · Continue IV Levaquin started the ED  · Will follow-up on the blood culture  · Will consider an ID evaluation  · Consult general surgery  · Of the patient reports having an outpatient evaluation set up Plastic surgery October 5, 2022 but is concerned she may miss it since here caregiver is unavailable to take care of her

## 2022-09-23 NOTE — ASSESSMENT & PLAN NOTE
Wt Readings from Last 3 Encounters:   09/22/22 (!) 206 kg (454 lb 2 4 oz)   07/25/22 (!) 201 kg (442 lb 7 4 oz)   06/14/22 (!) 211 kg (464 lb 8 2 oz)     · Clinically without exacerbation  · Continue Lasix 40 mg daily by mouth  · Continue Coumadin for anticoagulation purposes  · Monitor daily weights, input and output

## 2022-09-23 NOTE — CASE MANAGEMENT
Case Management Assessment & Discharge Planning Note    Patient name Schuyler Hannah  Location ED 26/ED 32 MRN [id number]  : 1950 Date 2022       Current Admission Date: 2022  Current Admission Diagnosis:Cellulitis   Patient Active Problem List    Diagnosis Date Noted    BMI 70 and over, adult ([place] [address] ) 2022    Chronic atrial fibrillation ([place] [address] ) 2022    History of Clostridioides difficile infection 2022    Lymphedema of extremity 2021    Other specified hypothyroidism 10/03/2020    Mild episode of recurrent major depressive disorder ([place] [address] ) 10/03/2020    Idiopathic chronic 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 10/03/2020    Primary osteoarthritis involving multiple joints 10/03/2020    Class 3 severe obesity due to excess calories with serious comorbidity and body mass index (BMI) greater than or equal to 70 in adult [facility]) 10/03/2020    Chronic diastolic congestive heart failure ([place] [address] ) 10/03/2020    Panniculitis 10/03/2020    Gastroesophageal reflux disease without esophagitis 10/03/2020    Hx MRSA infection 2020    Functional gait abnormality 2019    Impaired mobility 2019    Osteoarthritis of glenohumeral joint 2019    Left ovarian cyst 2017    Depression with anxiety 07/15/2017    Anemia 2016    Ambulatory dysfunction 2016    Adjustment disorder 2013    Irritable bowel syndrome 2012    Left atrial enlargement 2012    Left ventricular hypertrophy 2012    Carpal tunnel syndrome 2012    Gout 2012    Hyperlipidemia 2012    Osteoarthritis 2012    Symptomatic menopausal or female climacteric states 2012      LOS (days): 0  Geometric Mean LOS (GMLOS) (days):   Days to GMLOS:     OBJECTIVE:         Current admission status: Inpatient  Referral Reason: Other (Placement)    Preferred Pharmacy:    [address] [facility] [address] [facility]  Phone: [phone] Fax: [phone]    [facility] [place], [place] - [address]  [address] [facility] [address]  Phone: [phone] Fax: [phone]    Primary Care Provider: Enriqueta Ball MD    Primary Insurance: MEDICARE  Secondary Insurance: AARP    ASSESSMENT:  Jessica Ayala Proxies    There are no active Health Care Proxies on file  Advance Directives  Does patient have a [address]?: No  Was patient offered paperwork?: Yes (Declines)  Does patient currently have a Health Care decision maker?: No  Does patient have Advance Directives?: No  Was patient offered paperwork?: Yes (Declines)    Readmission Root Cause  30 Day Readmission: No    Patient Information  Admitted from[de-identified] Home  Mental Status: Alert  During Assessment patient was accompanied by: Not accompanied during assessment  Assessment information provided by[de-identified] Patient  Primary Caregiver: Self  Support Systems: Family members ([place] and nephew)  [place] of Residence: [address] do [address] live in?: [address] access options  Select all that apply : Ramp  Type of Current Residence: [address] [place] home (Stays on first floor)  Upon entering residence, is there a bedroom on the main floor (no further steps)?: Yes  Upon entering residence, is there a bathroom on the main floor (no further steps)?: Yes  In the last 12 months, was there a time when you were not able to pay the mortgage or rent on time?: No  In the last 12 months, how many places have you lived?: 1  In the last 12 months, was there a time when you did not have a steady place to sleep or slept in a shelter (including now)?: No  Homeless/housing insecurity resource given?: N/A  Living Arrangements: Lives Alone  Is patient a ?: No    Activities of Daily Living Prior to Admission  Functional Status: Total dependent  Completes ADLs independently?: No  Level of ADL dependence:  Total Dependent  Ambulates independently?: No  Level of ambulatory dependence: Total Dependent  Does patient use assisted devices?: No (Bedbound)  Does patient currently own DME?: Yes  What DME does the patient currently own?: Hospital Bed, Other (Comment) (Bedpan)  Does patient have a history of Outpatient Therapy (PT/OT)?: No  Does the patient have a history of Short-Term Rehab?: Yes ([facility])  Does patient have a history of HHC?: Yes  Does patient currently have [facility]?: Yes (Waiver services)    Current Home Health Care  Type of Current Home Care Services: Home health aide    Patient Information Continued  Income Source: Pension/halfway  Does patient have prescription coverage?: Yes  Within the past 12 months, you worried that your food would run out before you got the money to buy more : Never true  Within the past 12 months, the food you bought just didn't last and you didn't have money to get more : Never true  Food insecurity resource given?: N/A  Does patient receive dialysis treatments?: No  Does patient have a history of substance abuse?: No  Does patient have a history of Mental Health Diagnosis?: Yes (Anxiety depression)  Is patient receiving treatment for mental health?: No  Treatment options were provided      Means of Transportation  Means of Transport to Appts[de-identified] Other (Comment) (Transport via BLS)  In the past 12 months, has lack of transportation kept you from medical appointments or from getting medications?: No  In the past 12 months, has lack of transportation kept you from meetings, work, or from getting things needed for daily living?: No  Was application for public transport provided?: N/A    DISCHARGE DETAILS:    Discharge planning discussed with[de-identified] Patient  Freedom of Choice: Yes     CM contacted family/caregiver?: Yes  Were Treatment Team discharge recommendations reviewed with patient/caregiver?: Yes  Did patient/caregiver verbalize understanding of patient care needs?: Yes  Were patient/caregiver advised of the risks associated with not following Treatment Team discharge recommendations?: Yes    Contacts  Patient Contacts: Harry Kirti  Relationship to Patient[de-identified] Family  Contact Method: Phone  Phone Number: [phone]  Reason/Outcome: Discharge Planning    Other Referral/Resources/Interventions Provided:  Interventions: Other (Specify) (Placement)        Additional Comments: Met with patient in ED at bedside  Patient reports she has no caregivers and can  not go home  Reports she planned on going to [facility] and has been working with Bita Robbins in admissions  Reports roxana is a MSW at [facility] and she is on a waiting list there  Had a back up agency however patient would not sign service agreement since she did not agree with their terms  Has waiver services in the home 12 hrs a day 7 days a week from [address]  Call to [facility] [phone] and spoke with Garrett  Patient only has 1 staff member who will work with patient and she will be out 4-6 weeks starting today  Call to Bita Robbins at [facility] and patient would be a private pay for respite which she refuses, call to roxana alegre  requesting CB  Call to Yahir Killian [phone] x [phone] who is Service Coorniator to discuss same left  requesting CB  Met with patient at bedside to discuss bed search for facility  Patient not happy with options  Bed search started 50 miles and sent to 75 facilities

## 2022-09-23 NOTE — PLAN OF CARE
Problem: Prexisting or High Potential for Compromised Skin Integrity  Goal: Skin integrity is maintained or improved  Description: INTERVENTIONS:  - Identify patients at risk for skin breakdown  - Assess and monitor skin integrity  - Assess and monitor nutrition and hydration status  - Monitor labs   - Assess for incontinence   - Turn and reposition patient  - Assist with mobility/ambulation  - Relieve pressure over bony prominences  - Avoid friction and shearing  - Provide appropriate hygiene as needed including keeping skin clean and dry  - Evaluate need for skin moisturizer/barrier cream  - Collaborate with interdisciplinary team   - Patient/family teaching  - Consider wound care consult   Outcome: Progressing     Problem: PAIN - ADULT  Goal: Verbalizes/displays adequate comfort level or baseline comfort level  Description: Interventions:  - Encourage patient to monitor pain and request assistance  - Assess pain using appropriate pain scale  - Administer analgesics based on type and severity of pain and evaluate response  - Implement non-pharmacological measures as appropriate and evaluate response  - Consider cultural and social influences on pain and pain management  - Notify physician/advanced practitioner if interventions unsuccessful or patient reports new pain  Outcome: Progressing     Problem: INFECTION - ADULT  Goal: Absence or prevention of progression during hospitalization  Description: INTERVENTIONS:  - Assess and monitor for signs and symptoms of infection  - Monitor lab/diagnostic results  - Monitor all insertion sites, i e  indwelling lines, tubes, and drains  - Monitor endotracheal if appropriate and nasal secretions for changes in amount and color  - [place] appropriate cooling/warming therapies per order  - Administer medications as ordered  - Instruct and encourage patient and family to use good hand hygiene technique  - Identify and instruct in appropriate isolation precautions for identified infection/condition  Outcome: Progressing  Goal: Absence of fever/infection during neutropenic period  Description: INTERVENTIONS:  - Monitor WBC    Outcome: Progressing     Problem: SAFETY ADULT  Goal: Patient will remain free of falls  Description: INTERVENTIONS:  - Educate patient/family on patient safety including physical limitations  - Instruct patient to call for assistance with activity   - Consult OT/PT to assist with strengthening/mobility   - Keep Call bell within reach  - Keep bed low and locked with side rails adjusted as appropriate  - Keep care items and personal belongings within reach  - Initiate and maintain comfort rounds  - Make Fall Risk Sign visible to staff  - Offer Toileting every 1 Hours, in advance of need  - Initiate/Maintain bed alarm  - Obtain necessary fall risk management equipment: bed  - Apply yellow socks and bracelet for high fall risk patients  - Consider moving patient to room near nurses station  Outcome: Progressing  Goal: Maintain or return to baseline ADL function  Description: INTERVENTIONS:  -  Assess patient's ability to carry out ADLs; assess patient's baseline for ADL function and identify physical deficits which impact ability to perform ADLs (bathing, care of mouth/teeth, toileting, grooming, dressing, etc )  - Assess/evaluate cause of self-care deficits   - Assess range of motion  - Assess patient's mobility; develop plan if impaired  - Assess patient's need for assistive devices and provide as appropriate  - Encourage maximum independence but intervene and supervise when necessary  - Involve family in performance of ADLs  - Assess for home care needs following discharge   - Consider OT consult to assist with ADL evaluation and planning for discharge  - Provide patient education as appropriate  Outcome: Progressing  Goal: Maintains/Returns to pre admission functional level  Description: INTERVENTIONS:  - Perform BMAT or MOVE assessment daily    - Set and communicate daily mobility goal to care team and patient/family/caregiver  - Collaborate with rehabilitation services on mobility goals if consulted  - Perform Range of Motion 3 times a day  - Reposition patient every 2 hours  - Dangle patient 3 times a day  - Stand patient 3 times a day  - Ambulate patient 3 times a day  - Out of bed to chair 3 times a day   - Out of bed for meals 3 times a day  - Out of bed for toileting  - Record patient progress and toleration of activity level   Outcome: Progressing     Problem: DISCHARGE PLANNING  Goal: Discharge to home or other facility with appropriate resources  Description: INTERVENTIONS:  - Identify barriers to discharge w/patient and caregiver  - Arrange for needed discharge resources and transportation as appropriate  - Identify discharge learning needs (meds, wound care, etc )  - Arrange for interpretive services to assist at discharge as needed  - Refer to Case Management Department for coordinating discharge planning if the patient needs post-hospital services based on physician/advanced practitioner order or complex needs related to functional status, cognitive ability, or social support system  Outcome: Progressing     Problem: Knowledge Deficit  Goal: Patient/family/caregiver demonstrates understanding of disease process, treatment plan, medications, and discharge instructions  Description: Complete learning assessment and assess knowledge base    Interventions:  - Provide teaching at level of understanding  - Provide teaching via preferred learning methods  Outcome: Progressing     Problem: GASTROINTESTINAL - ADULT  Goal: Minimal or absence of nausea and/or vomiting  Description: INTERVENTIONS:  - Administer IV fluids if ordered to ensure adequate hydration  - Maintain NPO status until nausea and vomiting are resolved  - Nasogastric tube if ordered  - Administer ordered antiemetic medications as needed  - Provide nonpharmacologic comfort measures as appropriate  - Advance diet as tolerated, if ordered  - Consider nutrition services referral to assist patient with adequate nutrition and appropriate food choices  Outcome: Progressing  Goal: Maintains or returns to baseline bowel function  Description: INTERVENTIONS:  - Assess bowel function  - Encourage oral fluids to ensure adequate hydration  - Administer IV fluids if ordered to ensure adequate hydration  - Administer ordered medications as needed  - Encourage mobilization and activity  - Consider nutritional services referral to assist patient with adequate nutrition and appropriate food choices  Outcome: Progressing  Goal: Maintains adequate nutritional intake  Description: INTERVENTIONS:  - Monitor percentage of each meal consumed  - Identify factors contributing to decreased intake, treat as appropriate  - Assist with meals as needed  - Monitor I&O, weight, and lab values if indicated  - Obtain nutrition services referral as needed  Outcome: Progressing  Goal: Establish and maintain optimal ostomy function  Description: INTERVENTIONS:  - Assess bowel function  - Encourage oral fluids to ensure adequate hydration  - Administer IV fluids if ordered to ensure adequate hydration   - Administer ordered medications as needed  - Encourage mobilization and activity  - Nutrition services referral to assist patient with appropriate food choices  - Assess stoma site  - Consider wound care consult   Outcome: Progressing  Goal: Oral mucous membranes remain intact  Description: INTERVENTIONS  - Assess oral mucosa and hygiene practices  - Implement preventative oral hygiene regimen  - Implement oral medicated treatments as ordered  - Initiate Nutrition services referral as needed  Outcome: Progressing     Problem: METABOLIC, FLUID AND ELECTROLYTES - ADULT  Goal: Electrolytes maintained within normal limits  Description: INTERVENTIONS:  - Monitor labs and assess patient for signs and symptoms of electrolyte imbalances  - Administer electrolyte replacement as ordered  - Monitor response to electrolyte replacements, including repeat lab results as appropriate  - Instruct patient on fluid and nutrition as appropriate  Outcome: Progressing  Goal: Fluid balance maintained  Description: INTERVENTIONS:  - Monitor labs   - Monitor I/O and WT  - Instruct patient on fluid and nutrition as appropriate  - Assess for signs & symptoms of volume excess or deficit  Outcome: Progressing  Goal: Glucose maintained within target range  Description: INTERVENTIONS:  - Monitor Blood Glucose as ordered  - Assess for signs and symptoms of hyperglycemia and hypoglycemia  - Administer ordered medications to maintain glucose within target range  - Assess nutritional intake and initiate nutrition service referral as needed  Outcome: Progressing     Problem: SKIN/TISSUE INTEGRITY - ADULT  Goal: Skin Integrity remains intact(Skin Breakdown Prevention)  Description: Assess:  -Perform Gucci assessment every   -Clean and moisturize skin every   -Inspect skin when repositioning, toileting, and assisting with ADLS  -Assess under medical devices such as  every   -Assess extremities for adequate circulation and sensation     Bed Management:  -Have minimal linens on bed & keep smooth, unwrinkled  -Change linens as needed when moist or perspiring  -Avoid sitting or lying in one position for more than  hours while in bed  -Keep HOB at degrees     Toileting:  -Offer bedside commode  -Assess for incontinence every   -Use incontinent care products after each incontinent episode such as     Activity:  -Mobilize patient  times a day  -Encourage activity and walks on unit  -Encourage or provide ROM exercises   -Turn and reposition patient every  Hours  -Use appropriate equipment to lift or move patient in bed  -Instruct/ Assist with weight shifting every  when out of bed in chair  -Consider limitation of chair time  hour intervals    Skin Care:  -Avoid use of baby powder, tape, friction and shearing, hot water or constrictive clothing  -Relieve pressure over bony prominences using   -Do not massage red bony areas    Next Steps:  -Teach patient strategies to minimize risks such as    -Consider consults to  interdisciplinary teams such as   Outcome: Progressing  Goal: Incision(s), wounds(s) or drain site(s) healing without S/S of infection  Description: INTERVENTIONS  - Assess and document dressing, incision, wound bed, drain sites and surrounding tissue  - Provide patient and family education  - Perform skin care/dressing changes every   Outcome: Progressing  Goal: Pressure injury heals and does not worsen  Description: Interventions:  - Implement low air loss mattress or specialty surface (Criteria met)  - Apply silicone foam dressing  - Instruct/assist with weight shifting every  minutes when in chair   - Limit chair time to  hour intervals  - Use special pressure reducing interventions such as  when in chair   - Apply fecal or urinary incontinence containment device   - Perform passive or active ROM every   - Turn and reposition patient & offload bony prominences every  hours   - Utilize friction reducing device or surface for transfers   - Consider consults to  interdisciplinary teams such as   - Use incontinent care products after each incontinent episode such as  Consider nutrition services referral as needed  Outcome: Progressing     Problem: MUSCULOSKELETAL - ADULT  Goal: Maintain or return mobility to safest level of function  Description: INTERVENTIONS:  - Assess patient's ability to carry out ADLs; assess patient's baseline for ADL function and identify physical deficits which impact ability to perform ADLs (bathing, care of mouth/teeth, toileting, grooming, dressing, etc )  - Assess/evaluate cause of self-care deficits   - Assess range of motion  - Assess patient's mobility  - Assess patient's need for assistive devices and provide as appropriate  - Encourage maximum independence but intervene and supervise when necessary  - Involve family in performance of ADLs  - Assess for home care needs following discharge   - Consider OT consult to assist with ADL evaluation and planning for discharge  - Provide patient education as appropriate  Outcome: Progressing  Goal: Maintain proper alignment of affected body part  Description: INTERVENTIONS:  - Support, maintain and protect limb and body alignment  - Provide patient/ family with appropriate education  Outcome: Progressing

## 2022-09-23 NOTE — ED CARE HANDOFF
Emergency Department Sign Out Note        Sign out and transfer of care from Dr Ade Mendez  See Separate Emergency Department note  The patient, Joyce Burk, was evaluated by the previous provider for left lower extremity redness concern for cellulitis  Workup Completed:  Septic workup including CBC, CMP, procalcitonin, lactic acid, PT/INR, blood cultures    ED Course / Workup Pending (followup): Patient's workup was unremarkable, patient was signed out to me pending case management consult in the morning for placement  ED Course as of 09/23/22 0618   Thu Sep 22, 2022   9837 SO: possible lower extremity cellulitis, ED hold for placement     Procedures  MDM  Number of Diagnoses or Management Options  Cellulitis  Diagnosis management comments: 78-year-old female presented to the emergency department for evaluation of possible lower extremity cellulitis  Patient has history of similar  Septic workup was completed which was unremarkable  Patient was started on Levaquin based on prior cultures which have grown Pseudomonas and Proteus  Patient was signed out to me pending case management consult as patient will require placement as she is unable to take care of herself and her aide is unable to assist at this time  Overnight, patient remained hemodynamically stable  Home medications were ordered  Patient will need to complete a total of 7 days of Levaquin  Disposition  Final diagnoses:   Cellulitis     Time reflects when diagnosis was documented in both MDM as applicable and the Disposition within this note     Time User Action Codes Description Comment    9/23/2022  1:48 AM Check, Devon Mortimer Add [L03 90] Cellulitis       ED Disposition     None      Follow-up Information    None       Patient's Medications   Discharge Prescriptions    No medications on file     No discharge procedures on file         ED Provider  Electronically Signed by     Rayray Chapman, MD  09/23/22 7072

## 2022-09-23 NOTE — CASE MANAGEMENT
Case Management Progress Note    Patient name Dileep Enamorado  Location [address] [id number] MRN [id number]  : 1950 Date 2022       LOS (days): 0  Geometric Mean LOS (GMLOS) (days): 3 20  Days to GMLOS:3 1        OBJECTIVE:        Current admission status: Inpatient  Preferred Pharmacy:    [address] [facility] [address]  Phone: [phone] Fax: [phone] [address]  [address]  [address] [facility] [address]  Phone: [phone] Fax: [phone]    Primary Care Provider: Laura Reed MD    Primary Insurance: MEDICARE  Secondary Insurance: AARP    PROGRESS NOTE:    No beds found for SNF  Additional referrals made via Aidin  Met with patient at bedside and updated same

## 2022-09-24 LAB
ANION GAP SERPL CALCULATED.3IONS-SCNC: 4 MMOL/L (ref 4–13)
BUN SERPL-MCNC: 27 MG/DL (ref 5–25)
CALCIUM SERPL-MCNC: 8.8 MG/DL (ref 8.4–10.2)
CHLORIDE SERPL-SCNC: 99 MMOL/L (ref 96–108)
CO2 SERPL-SCNC: 32 MMOL/L (ref 21–32)
CREAT SERPL-MCNC: 1.43 MG/DL (ref 0.6–1.3)
ERYTHROCYTE [DISTWIDTH] IN BLOOD BY AUTOMATED COUNT: 16.8 % (ref 11.6–15.1)
GFR SERPL CREATININE-BSD FRML MDRD: 36 ML/MIN/1.73SQ M
GLUCOSE SERPL-MCNC: 106 MG/DL (ref 65–140)
HCT VFR BLD AUTO: 34.5 % (ref 34.8–46.1)
HGB BLD-MCNC: 10.2 G/DL (ref 11.5–15.4)
INR PPP: 2.34 (ref 0.84–1.19)
MCH RBC QN AUTO: 25.2 PG (ref 26.8–34.3)
MCHC RBC AUTO-ENTMCNC: 29.6 G/DL (ref 31.4–37.4)
MCV RBC AUTO: 85 FL (ref 82–98)
PLATELET # BLD AUTO: 391 THOUSANDS/UL (ref 149–390)
PMV BLD AUTO: 8.9 FL (ref 8.9–12.7)
POTASSIUM SERPL-SCNC: 4.2 MMOL/L (ref 3.5–5.3)
PROTHROMBIN TIME: 25.6 SECONDS (ref 11.6–14.5)
RBC # BLD AUTO: 4.05 MILLION/UL (ref 3.81–5.12)
SODIUM SERPL-SCNC: 135 MMOL/L (ref 135–147)
WBC # BLD AUTO: 9.16 THOUSAND/UL (ref 4.31–10.16)

## 2022-09-24 PROCEDURE — 85027 COMPLETE CBC AUTOMATED: CPT | Performed by: PHYSICIAN ASSISTANT

## 2022-09-24 PROCEDURE — 85610 PROTHROMBIN TIME: CPT | Performed by: HOSPITALIST

## 2022-09-24 PROCEDURE — 80048 BASIC METABOLIC PNL TOTAL CA: CPT | Performed by: PHYSICIAN ASSISTANT

## 2022-09-24 PROCEDURE — 99232 SBSQ HOSP IP/OBS MODERATE 35: CPT | Performed by: STUDENT IN AN ORGANIZED HEALTH CARE EDUCATION/TRAINING PROGRAM

## 2022-09-24 PROCEDURE — 97163 PT EVAL HIGH COMPLEX 45 MIN: CPT

## 2022-09-24 RX ORDER — AMOXICILLIN 250 MG
1 CAPSULE ORAL
Status: DISCONTINUED | OUTPATIENT
Start: 2022-09-24 | End: 2022-09-29 | Stop reason: HOSPADM

## 2022-09-24 RX ORDER — POLYETHYLENE GLYCOL 3350 17 G/17G
17 POWDER, FOR SOLUTION ORAL DAILY
Status: DISCONTINUED | OUTPATIENT
Start: 2022-09-24 | End: 2022-09-29 | Stop reason: HOSPADM

## 2022-09-24 RX ADMIN — LEVOFLOXACIN 750 MG: 750 TABLET, FILM COATED ORAL at 19:53

## 2022-09-24 RX ADMIN — POLYETHYLENE GLYCOL 3350 17 G: 17 POWDER, FOR SOLUTION ORAL at 17:11

## 2022-09-24 RX ADMIN — Medication 250 MG: at 08:17

## 2022-09-24 RX ADMIN — VANCOMYCIN HYDROCHLORIDE 125 MG: 125 CAPSULE ORAL at 01:02

## 2022-09-24 RX ADMIN — VANCOMYCIN HYDROCHLORIDE 125 MG: 125 CAPSULE ORAL at 15:29

## 2022-09-24 RX ADMIN — WARFARIN SODIUM 4 MG: 2 TABLET ORAL at 17:08

## 2022-09-24 RX ADMIN — LEVOTHYROXINE SODIUM 125 MCG: 25 TABLET ORAL at 05:30

## 2022-09-24 RX ADMIN — SENNOSIDES AND DOCUSATE SODIUM 1 TABLET: 50; 8.6 TABLET ORAL at 22:55

## 2022-09-24 RX ADMIN — FUROSEMIDE 40 MG: 40 TABLET ORAL at 08:17

## 2022-09-24 RX ADMIN — DULOXETINE 20 MG: 20 CAPSULE, DELAYED RELEASE ORAL at 08:17

## 2022-09-24 RX ADMIN — ALLOPURINOL 100 MG: 100 TABLET ORAL at 08:17

## 2022-09-24 RX ADMIN — Medication 250 MG: at 17:08

## 2022-09-24 NOTE — PHYSICAL THERAPY NOTE
Physical Therapy Evaluation     Patient's Name: Jayjay Brennan    Admitting Diagnosis  Cellulitis [L03 90]  Physical deconditioning [R53 81]    Problem List  Patient Active Problem List   Diagnosis    Other specified hypothyroidism    Mild episode of recurrent major depressive disorder ([facility] )    Idiopathic chronic 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    Primary osteoarthritis involving multiple joints    Chronic diastolic congestive heart failure (HCC)    Panniculitis    Gastroesophageal reflux disease without esophagitis    Lymphedema of extremity    Adjustment disorder    Ambulatory dysfunction    Anemia    Carpal tunnel syndrome    Acute kidney injury superimposed on CKD ([facility] )    Depression with anxiety    Gout    Hx MRSA infection    Hyperlipidemia    Irritable bowel syndrome    Left atrial enlargement    Left ovarian cyst    Left ventricular hypertrophy    Symptomatic menopausal or female climacteric states    History of Clostridioides difficile infection    Chronic atrial fibrillation ([facility] )    BMI 70 and over, adult ([facility] )    Functional gait abnormality    Impaired mobility    Osteoarthritis of glenohumeral joint       Past Medical History  Past Medical History:   Diagnosis Date    Atrial fibrillation ([facility] )     Bladder stone     C  difficile colitis     Cellulitis     CHF (congestive heart failure) (HCC)     Depression with anxiety     Disease of thyroid gland     Diverticulitis     Enterocolitis     History of abnormal cervical Pap smear     Kidney stone     Lymphedema     Menopause     Age 53    Morbid obesity with BMI of 70 and over, adult ([facility] )     MRSA (methicillin resistant Staphylococcus aureus)     abdominal wound    Osteoarthritis     Phlebitis     left lower leg    Spondylolysis        Past Surgical History  Past Surgical History:   Procedure Laterality Date    APPENDECTOMY      CARPAL TUNNEL RELEASE      CHOLECYSTECTOMY      CYSTOSCOPY      stent    FOOT SURGERY  1982    bone spur    KNEE SURGERY      WISDOM TOOTH EXTRACTION          09/24/22 0955   PT Last Visit   PT Visit Date 09/24/22   Note Type   Note type Evaluation   Pain Assessment   Pain Assessment Tool 0-10   Pain Score 8  ("7,8")   Pain Location/Orientation Orientation: Bilateral;Location: Generalized  ("I have osteoarthritis so bad")   Pain Onset/Description Onset: Ongoing;Frequency: Constant/Continuous; Descriptor: Aching;Descriptor: Sore   Effect of Pain on Daily Activities yes   Patient's Stated Pain Goal No pain   Hospital Pain Intervention(s) Repositioned; Ambulation/increased activity; Medication (See MAR); Emotional support; Environmental changes;Relaxation technique  (pt  reports meditation on regular basis)   Multiple Pain Sites Yes   Restrictions/Precautions   Weight Bearing Precautions Per Order No   Other Precautions Fall Risk;Pain;Bed Alarm;Contact/isolation  (mordid obesity)   Home Living   Type of [address] ADLs on one level; Able to live on main level with bedroom/bathroom; Access; Ramped entrance  ("I'm bedridden")   Bathroom Shower/Tub   (sponge bathing at baseline, reports she manages her upper chest,arms)   Bathroom Toilet   (baseline bedpan usage)   Bathroom Accessibility   (when asked if she utilizes a aisha lift, she repors she cannot due to B lymphedema)   Prior Function   Level of [place] Needs assistance with IADLs; Needs assistance with ADLs and functional mobility   Lives With Other (Comment)  (caregivers daily 8a->8p)   Receives Help From Personal care attendant   ADL Assistance Needs assistance   IADLs Needs assistance   Falls in the last 6 months 0  (denies)   Comments "I can't aisha out to sit in a wheelchair, I'm too big"   General   Family/Caregiver Present No   Cognition   Overall Cognitive Status WFL   Arousal/Participation Alert   Orientation Level Oriented X4   Memory Within functional limits   Following Commands Follows all commands and directions without difficulty   Comments Pt  agreeable to PT assessment, pleasant  RLE Assessment   RLE Assessment X  (2/5 gross musculature)   LLE Assessment   LLE Assessment X  (2/5 gross musculature)   Vision-Basic Assessment   Current Vision No visual deficits   Vestibular   Spontaneous Nystagmus (-) no evidence of nystagmus at rest in room light   Coordination   Movements are Fluid and Coordinated 0   Coordination and Movement Description Incremental mobility requiring significant physical assistance  Bed Mobility   Rolling R 2  Maximal assistance   Additional items Assist x 1;Bedrails; Increased time required;Verbal cues;LE management   Rolling L 2  Maximal assistance   Additional items Assist x 1;Bedrails; Increased time required;Verbal cues;LE management   Supine to Sit Unable to assess  (due to baseline bedbound status)   Additional Comments Additional functional mobility tasks could not be assessed due to bedbound baseline status, extensive physical assistance for B rolling  Repositioning complete as desired  Transfers   Sit to Stand Unable to assess   Ambulation/Elevation   Gait pattern Not tested   Balance   Static Sitting   (unable to assess)   Endurance Deficit   Endurance Deficit Yes   Activity Tolerance   Activity Tolerance Patient limited by fatigue;Patient limited by pain; Other (Comment)  (baseline immobility status, morbid obesity)   Medical Staff Made Aware Yes, Sadiq Ascencio CM was informed of assessment outcome  Nurse Made Aware Yes, nursing staff was informed of assessment outcome  Assessment   Prognosis Fair   Problem List Decreased strength;Decreased endurance; Impaired balance;Decreased mobility; Decreased coordination;Obesity;Pain   Assessment Pt is 71 y o  female seen for PT evaluation s/p admit to [facility] on 9/22/2022 w/ Panniculitis  PT consulted to assess pt's functional mobility and d/c needs  Order placed for PT eval and tx, w/ up and OOB as tolerated order   Comorbidities affecting pt's physical performance at time of assessment include: weakness,panniculitis, LARRY on CKD, ambulatory dysfunction, chronic A-fib, morbid obesity, depression with anxiety,chronic diastolic CHF, osteoarthritis,anemia   PTA, pt was bedbound with in home aides  Personal factors affecting pt at time of IE include: anxiety, depression, unable to perform physical activity and limited insight into impairments  Please find objective findings from PT assessment regarding body systems outlined above with impairments and limitations including weakness, decreased endurance, impaired coordination, pain, decreased activity tolerance, decreased functional mobility tolerance, fall risk and decreased skin integrity  From PT/mobility standpoint, recommendation at time of d/c would be no rehabilitation needs with resumption of prior in home aides pending progress in order to facilitate return to PLOF  Acute PT interventions will be discontinued at this time as patient appears to be at functional baseline  Goals   Patient Goals to go home soon   PT Treatment Day 0   Plan   PT Frequency   (D/C acute PT interventions at this time )   Recommendation   PT Discharge Recommendation No rehabilitation needs  (resume prior caregivers)   Additional Comments Upon conclusion, pt was resting in bed  All needs within reach     AM-PAC Basic Mobility Inpatient   Turning in Bed Without Bedrails 2   Lying on Back to Sitting on Edge of Flat Bed 1   Moving Bed to Chair 1   Standing Up From Chair 1   Walk in Room 1   Climb 3-5 Stairs 1   Basic Mobility Inpatient Raw Score 7   Turning Head Towards Sound 4   Follow Simple Instructions 4   Low Function Basic Mobility Raw Score 15   Low Function Basic Mobility Standardized Score 23 9   Highest Level Of Mobility   JH-HLM Goal 2: Bed activities/Dependent transfer   JH-HLM Achieved 2: Bed activities/Dependent transfer     History/Personal Factors/Comorbidities: weakness,panniculitis, LARRY on CKD, ambulatory dysfunction, chronic A-fib, morbid obesity, depression with anxiety,chronic diastolic CHF, osteoarthritis,anemia     # of body structures/limitations: muscle weakness, activity intolerance,decreased endurance,pain    Clinical presentation: unstable as seen in pain severity constant in nature in more than one body region,fall risk, morbid obesity, decreased skin integrity,progressive symptoms prior to hospitalization    Initial Assessment Time: 4084-6455      Hanna Davis, PT

## 2022-09-24 NOTE — PLAN OF CARE
Problem: Prexisting or High Potential for Compromised Skin Integrity  Goal: Skin integrity is maintained or improved  Description: INTERVENTIONS:  - Identify patients at risk for skin breakdown  - Assess and monitor skin integrity  - Assess and monitor nutrition and hydration status  - Monitor labs   - Assess for incontinence   - Turn and reposition patient  - Assist with mobility/ambulation  - Relieve pressure over bony prominences  - Avoid friction and shearing  - Provide appropriate hygiene as needed including keeping skin clean and dry  - Evaluate need for skin moisturizer/barrier cream  - Collaborate with interdisciplinary team   - Patient/family teaching  - Consider wound care consult   Outcome: Progressing     Problem: PAIN - ADULT  Goal: Verbalizes/displays adequate comfort level or baseline comfort level  Description: Interventions:  - Encourage patient to monitor pain and request assistance  - Assess pain using appropriate pain scale  - Administer analgesics based on type and severity of pain and evaluate response  - Implement non-pharmacological measures as appropriate and evaluate response  - Consider cultural and social influences on pain and pain management  - Notify physician/advanced practitioner if interventions unsuccessful or patient reports new pain  Outcome: Progressing     Problem: INFECTION - ADULT  Goal: Absence or prevention of progression during hospitalization  Description: INTERVENTIONS:  - Assess and monitor for signs and symptoms of infection  - Monitor lab/diagnostic results  - Monitor all insertion sites, i e  indwelling lines, tubes, and drains  - Monitor endotracheal if appropriate and nasal secretions for changes in amount and color  - [place] appropriate cooling/warming therapies per order  - Administer medications as ordered  - Instruct and encourage patient and family to use good hand hygiene technique  - Identify and instruct in appropriate isolation precautions for identified infection/condition  Outcome: Progressing     Problem: SAFETY ADULT  Goal: Patient will remain free of falls  Description: INTERVENTIONS:  - Educate patient/family on patient safety including physical limitations  - Instruct patient to call for assistance with activity   - Consult OT/PT to assist with strengthening/mobility   - Keep Call bell within reach  - Keep bed low and locked with side rails adjusted as appropriate  - Keep care items and personal belongings within reach  - Initiate and maintain comfort rounds  - Make Fall Risk Sign visible to staff  - Offer Toileting every 2 Hours, in advance of need  - Initiate/Maintain bed alarm  - Obtain necessary fall risk management equipment  - Apply yellow socks and bracelet for high fall risk patients  - Consider moving patient to room near nurses station  Outcome: Progressing  Goal: Maintain or return to baseline ADL function  Description: INTERVENTIONS:  -  Assess patient's ability to carry out ADLs; assess patient's baseline for ADL function and identify physical deficits which impact ability to perform ADLs (bathing, care of mouth/teeth, toileting, grooming, dressing, etc )  - Assess/evaluate cause of self-care deficits   - Assess range of motion  - Assess patient's mobility; develop plan if impaired  - Assess patient's need for assistive devices and provide as appropriate  - Encourage maximum independence but intervene and supervise when necessary  - Involve family in performance of ADLs  - Assess for home care needs following discharge   - Consider OT consult to assist with ADL evaluation and planning for discharge  - Provide patient education as appropriate  Outcome: Progressing  Goal: Maintains/Returns to pre admission functional level  Description: INTERVENTIONS:  - Perform BMAT or MOVE assessment daily    - Set and communicate daily mobility goal to care team and patient/family/caregiver     - Collaborate with rehabilitation services on mobility goals if consulted  - Perform Range of Motion 3 times a day  - Reposition patient every 2 hours  - Dangle patient 3 times a day  - Stand patient 3 times a day  - Ambulate patient 3 times a day  - Out of bed to chair 3 times a day   - Out of bed for meals 3 times a day  - Out of bed for toileting  - Record patient progress and toleration of activity level   Outcome: Progressing     Problem: DISCHARGE PLANNING  Goal: Discharge to home or other facility with appropriate resources  Description: INTERVENTIONS:  - Identify barriers to discharge w/patient and caregiver  - Arrange for needed discharge resources and transportation as appropriate  - Identify discharge learning needs (meds, wound care, etc )  - Arrange for interpretive services to assist at discharge as needed  - Refer to Case Management Department for coordinating discharge planning if the patient needs post-hospital services based on physician/advanced practitioner order or complex needs related to functional status, cognitive ability, or social support system  Outcome: Progressing     Problem: Knowledge Deficit  Goal: Patient/family/caregiver demonstrates understanding of disease process, treatment plan, medications, and discharge instructions  Description: Complete learning assessment and assess knowledge base    Interventions:  - Provide teaching at level of understanding  - Provide teaching via preferred learning methods  Outcome: Progressing     Problem: GASTROINTESTINAL - ADULT  Goal: Minimal or absence of nausea and/or vomiting  Description: INTERVENTIONS:  - Administer IV fluids if ordered to ensure adequate hydration  - Maintain NPO status until nausea and vomiting are resolved  - Nasogastric tube if ordered  - Administer ordered antiemetic medications as needed  - Provide nonpharmacologic comfort measures as appropriate  - Advance diet as tolerated, if ordered  - Consider nutrition services referral to assist patient with adequate nutrition and appropriate food choices  Outcome: Progressing  Goal: Maintains or returns to baseline bowel function  Description: INTERVENTIONS:  - Assess bowel function  - Encourage oral fluids to ensure adequate hydration  - Administer IV fluids if ordered to ensure adequate hydration  - Administer ordered medications as needed  - Encourage mobilization and activity  - Consider nutritional services referral to assist patient with adequate nutrition and appropriate food choices  Outcome: Progressing  Goal: Maintains adequate nutritional intake  Description: INTERVENTIONS:  - Monitor percentage of each meal consumed  - Identify factors contributing to decreased intake, treat as appropriate  - Assist with meals as needed  - Monitor I&O, weight, and lab values if indicated  - Obtain nutrition services referral as needed  Outcome: Progressing  Goal: Establish and maintain optimal ostomy function  Description: INTERVENTIONS:  - Assess bowel function  - Encourage oral fluids to ensure adequate hydration  - Administer IV fluids if ordered to ensure adequate hydration   - Administer ordered medications as needed  - Encourage mobilization and activity  - Nutrition services referral to assist patient with appropriate food choices  - Assess stoma site  - Consider wound care consult   Outcome: Progressing  Goal: Oral mucous membranes remain intact  Description: INTERVENTIONS  - Assess oral mucosa and hygiene practices  - Implement preventative oral hygiene regimen  - Implement oral medicated treatments as ordered  - Initiate Nutrition services referral as needed  Outcome: Progressing     Problem: METABOLIC, FLUID AND ELECTROLYTES - ADULT  Goal: Electrolytes maintained within normal limits  Description: INTERVENTIONS:  - Monitor labs and assess patient for signs and symptoms of electrolyte imbalances  - Administer electrolyte replacement as ordered  - Monitor response to electrolyte replacements, including repeat lab results as appropriate  - Instruct patient on fluid and nutrition as appropriate  Outcome: Progressing  Goal: Fluid balance maintained  Description: INTERVENTIONS:  - Monitor labs   - Monitor I/O and WT  - Instruct patient on fluid and nutrition as appropriate  - Assess for signs & symptoms of volume excess or deficit  Outcome: Progressing  Goal: Glucose maintained within target range  Description: INTERVENTIONS:  - Monitor Blood Glucose as ordered  - Assess for signs and symptoms of hyperglycemia and hypoglycemia  - Administer ordered medications to maintain glucose within target range  - Assess nutritional intake and initiate nutrition service referral as needed  Outcome: Progressing     Problem: SKIN/TISSUE INTEGRITY - ADULT  Goal: Skin Integrity remains intact(Skin Breakdown Prevention)  Description: Assess:  -Perform Gucci assessment  -Clean and moisturize skin  -Inspect skin when repositioning, toileting, and assisting with ADLS  -Assess under medical devices   -Assess extremities for adequate circulation and sensation     Bed Management:  -Have minimal linens on bed & keep smooth, unwrinkled  -Change linens as needed when moist or perspiring  -Avoid sitting or lying in one position for more than 2 hours while in bed  -Keep HOB at 30 degrees     Toileting:  -Offer bedside commode  -Assess for incontinence  -Use incontinent care products after each incontinent episode    Activity:  -Mobilize patient 3 times a day  -Encourage activity and walks on unit  -Encourage or provide ROM exercises   -Turn and reposition patient every 2 Hours  -Use appropriate equipment to lift or move patient in bed  -Instruct/ Assist with weight shifting when out of bed in chair  -Consider limitation of chair time 2 hour intervals    Skin Care:  -Avoid use of baby powder, tape, friction and shearing, hot water or constrictive clothing  -Relieve pressure over bony prominences  -Do not massage red bony areas    Next Steps:  -Teach patient strategies to minimize risks    -Consider consults to  interdisciplinary teams   Outcome: Progressing  Goal: Incision(s), wounds(s) or drain site(s) healing without S/S of infection  Description: INTERVENTIONS  - Assess and document dressing, incision, wound bed, drain sites and surrounding tissue  - Provide patient and family education  - Perform skin care/dressing changes   Outcome: Progressing  Goal: Pressure injury heals and does not worsen  Description: Interventions:  - Implement low air loss mattress or specialty surface (Criteria met)  - Apply silicone foam dressing  - Instruct/assist with weight shifting when in chair   - Limit chair time to 2 hour intervals  - Use special pressure reducing interventions  when in chair   - Apply fecal or urinary incontinence containment device   - Perform passive or active ROM   - Turn and reposition patient & offload bony prominences every 2 hours   - Utilize friction reducing device or surface for transfers   - Consider consults to  interdisciplinary teams   - Use incontinent care products after each incontinent episode   - Consider nutrition services referral as needed  Outcome: Progressing     Problem: MUSCULOSKELETAL - ADULT  Goal: Maintain or return mobility to safest level of function  Description: INTERVENTIONS:  - Assess patient's ability to carry out ADLs; assess patient's baseline for ADL function and identify physical deficits which impact ability to perform ADLs (bathing, care of mouth/teeth, toileting, grooming, dressing, etc )  - Assess/evaluate cause of self-care deficits   - Assess range of motion  - Assess patient's mobility  - Assess patient's need for assistive devices and provide as appropriate  - Encourage maximum independence but intervene and supervise when necessary  - Involve family in performance of ADLs  - Assess for home care needs following discharge   - Consider OT consult to assist with ADL evaluation and planning for discharge  - Provide patient education as appropriate  Outcome: Progressing  Goal: Maintain proper alignment of affected body part  Description: INTERVENTIONS:  - Support, maintain and protect limb and body alignment  - Provide patient/ family with appropriate education  Outcome: Progressing     Problem: MOBILITY - ADULT  Goal: Maintain or return to baseline ADL function  Description: INTERVENTIONS:  -  Assess patient's ability to carry out ADLs; assess patient's baseline for ADL function and identify physical deficits which impact ability to perform ADLs (bathing, care of mouth/teeth, toileting, grooming, dressing, etc )  - Assess/evaluate cause of self-care deficits   - Assess range of motion  - Assess patient's mobility; develop plan if impaired  - Assess patient's need for assistive devices and provide as appropriate  - Encourage maximum independence but intervene and supervise when necessary  - Involve family in performance of ADLs  - Assess for home care needs following discharge   - Consider OT consult to assist with ADL evaluation and planning for discharge  - Provide patient education as appropriate  Outcome: Progressing  Goal: Maintains/Returns to pre admission functional level  Description: INTERVENTIONS:  - Perform BMAT or MOVE assessment daily    - Set and communicate daily mobility goal to care team and patient/family/caregiver  - Collaborate with rehabilitation services on mobility goals if consulted  - Perform Range of Motion 3 times a day  - Reposition patient every 2 hours    - Dangle patient 3 times a day  - Stand patient 3 times a day  - Ambulate patient 3 times a day  - Out of bed to chair 3 times a day   - Out of bed for meals 3 times a day  - Out of bed for toileting  - Record patient progress and toleration of activity level   Outcome: Progressing

## 2022-09-24 NOTE — PROGRESS NOTES
[address]  Progress Note - Favio Romano 1950, 71 y o  female MRN: [id number]  Unit/Bed#: -01 Encounter: [id number]  Primary Care Provider: Carloz Jean-Baptiste MD   Date and time admitted to hospital: 9/22/2022  5:31 PM    * Panniculitis  Assessment & Plan  · Left pannus/hip/thigh cellulitis - recurrent  · Continue IV Levaquin started the ED  · Will follow-up on the blood culture  · Will consider an ID evaluation  · Consult general surgery  · Of the patient reports having an outpatient evaluation set up Plastic surgery October 5, 2022 but is concerned she may miss it since here caregiver is unavailable to take care of her     Acute kidney injury superimposed on CKD [facility])  Assessment & Plan  Lab Results   Component Value Date    EGFR 36 09/24/2022    EGFR 50 09/22/2022    EGFR 47 08/01/2022    CREATININE 1 43 (H) 09/24/2022    CREATININE 1 11 09/22/2022    CREATININE 1 17 08/01/2022       -Slight LARRY BMP today  -hold Lasix given LARRY  -trend creatinine    BMI 70 and over, adult ([facility] [place] [address] )  Assessment & Plan  · BMI 80 45  · Healthy diet recommended  · Consider referral to Bariatric Services post discharge    Chronic atrial fibrillation ([facility] [place] [address] )  Assessment & Plan  · Continue Coumadin for anticoagulation purposes  · INR 2 3  · Rate is controlled    History of Clostridioides difficile infection  Assessment & Plan  · Continue oral vancomycin for C diff prophylaxis 125 mg p o  q 12 hours    Depression with anxiety  Assessment & Plan  · Continue Cymbalta    Ambulatory dysfunction  Assessment & Plan  · PT OT case management for discharge planning  · Patient reports that her caregiver is unavailable to take care of her  · Case management is working on placement    Chronic diastolic congestive heart failure ([facility] [place] [address] )  Assessment & Plan  Wt Readings from Last 3 Encounters:   09/22/22 (!) 206 kg (454 lb 2 4 oz)   07/25/22 (!) 201 kg (442 lb 7 4 oz)   06/14/22 (!) 211 kg (464 lb 8 2 oz) · Clinically without exacerbation  · Hold Lasix given LARRY  · Continue Coumadin for anticoagulation purposes  · Monitor daily weights, input and output      Other specified hypothyroidism  Assessment & Plan  · Continue Levothyroxine      VTE Pharmacologic Prophylaxis: VTE Score: 7 High Risk (Score >/= 5) - Pharmacological DVT Prophylaxis Ordered: warfarin (Coumadin)  Sequential Compression Devices Ordered  Patient Centered Rounds: I performed bedside rounds with nursing staff today  Discussions with Specialists or Other Care Team Provider:  General surgery    Education and Discussions with Family / Patient:  Discussed with patient all questions answered    Time Spent for Care: 45 minutes  More than 50% of total time spent on counseling and coordination of care as described above  Current Length of Stay: 1 day(s)  Current Patient Status: Inpatient   Certification Statement: The patient will continue to require additional inpatient hospital stay due to Panniculitis requiring surgical evaluation antibiotics  Discharge Plan: Pending clinical course    Code Status: Level 1 - Full Code    Subjective:   Patient with no acute events overnight  Objective:     Vitals:   Temp (24hrs), Av 9 °F (36 6 °C), Min:97 7 °F (36 5 °C), Max:98 °F (36 7 °C)    Temp:  [97 7 °F (36 5 °C)-98 °F (36 7 °C)] 98 °F (36 7 °C)  HR:  [70-79] 79  Resp:  [18] 18  BP: (109-119)/(52-69) 117/52  SpO2:  [91 %-95 %] 95 %  Body mass index is 80 45 kg/m²  Input and Output Summary (last 24 hours): Intake/Output Summary (Last 24 hours) at 2022 1103  Last data filed at 2022 0818  Gross per 24 hour   Intake 720 ml   Output --   Net 720 ml       Physical Exam:   Physical Exam  Constitutional:       Appearance: She is obese  HENT:      Head: Normocephalic  Cardiovascular:      Rate and Rhythm: Normal rate and regular rhythm  Pulses: Normal pulses  Heart sounds: Normal heart sounds     Pulmonary:      Effort: Pulmonary effort is normal    Abdominal:      General: Abdomen is flat  Bowel sounds are normal       Palpations: Abdomen is soft  Musculoskeletal:         General: Normal range of motion  Cervical back: Normal range of motion  Right lower leg: Edema present  Left lower leg: Edema present  Skin:     General: Skin is warm  Comments: Panniculitis   Neurological:      General: No focal deficit present  Mental Status: She is alert and oriented to person, place, and time  Mental status is at baseline  Psychiatric:         Mood and Affect: Mood normal           Additional Data:     Labs:  Results from last 7 days   Lab Units 09/24/22  0437 09/22/22  1809   WBC Thousand/uL 9 16 9 91   HEMOGLOBIN g/dL 10 2* 11 7   HEMATOCRIT % 34 5* 39 0   PLATELETS Thousands/uL 391* 420*   NEUTROS PCT %  --  78*   LYMPHS PCT %  --  13*   MONOS PCT %  --  6   EOS PCT %  --  3     Results from last 7 days   Lab Units 09/24/22  0437 09/22/22  1809   SODIUM mmol/L 135 135   POTASSIUM mmol/L 4 2 3 9   CHLORIDE mmol/L 99 97   CO2 mmol/L 32 30   BUN mg/dL 27* 23   CREATININE mg/dL 1 43* 1 11   ANION GAP mmol/L 4 8   CALCIUM mg/dL 8 8 9 1   ALBUMIN g/dL  --  3 2*   TOTAL BILIRUBIN mg/dL  --  0 89   ALK PHOS U/L  --  106*   ALT U/L  --  13   AST U/L  --  18   GLUCOSE RANDOM mg/dL 106 111     Results from last 7 days   Lab Units 09/24/22  0437   INR  2 34*             Results from last 7 days   Lab Units 09/22/22  1809   LACTIC ACID mmol/L 1 4   PROCALCITONIN ng/ml 0 07       Lines/Drains:  Invasive Devices  Report    Peripheral Intravenous Line  Duration           Peripheral IV 09/22/22 Right Antecubital 1 day                      Imaging: No pertinent imaging reviewed  Recent Cultures (last 7 days):   Results from last 7 days   Lab Units 09/22/22  1809   BLOOD CULTURE  No Growth at 24 hrs  No Growth at 24 hrs         Last 24 Hours Medication List:   Current Facility-Administered Medications   Medication Dose Route Frequency Provider Last Rate    acetaminophen  650 mg Oral Q8H PRN Raghavendra Caitie, PA-SURESH      allopurinol  100 mg Oral Daily Raghavendra Caitie, PA-SURESH      DULoxetine  20 mg Oral Daily Raghavendra Caitie, PA-C      furosemide  40 mg Oral Daily Raghavendra Caitie, PA-SURESH      levofloxacin  750 mg Oral Q24H Travon Ley DO      levothyroxine  125 mcg Oral Early Morning Raghavendra Caitie, MYA      ondansetron  4 mg Intravenous Q6H PRN Meka Rojas      oxyCODONE  5 mg Oral Q6H PRN Raghavendra Caitie, PA-SURESH      saccharomyces boulardii  250 mg Oral BID Raghavendra Caitie, PA-SURESH      vancomycin  125 mg Oral Q12H Raghavendra Caitie, PA-SURESH      warfarin  2 mg Oral Once per day on Mon Wed Fri Raghavendra Caitie, PA-C      warfarin  4 mg Oral Once per day on Sun Tue Thu Sat Raghavendra CaitieMYA mendoza          Today, Patient Was Seen By: Yuko Christensen DO    **Please Note: This note may have been constructed using a voice recognition system  **

## 2022-09-24 NOTE — ASSESSMENT & PLAN NOTE
Lab Results   Component Value Date    EGFR 36 09/24/2022    EGFR 50 09/22/2022    EGFR 47 08/01/2022    CREATININE 1 43 (H) 09/24/2022    CREATININE 1 11 09/22/2022    CREATININE 1 17 08/01/2022       -Slight LARRY BMP today  -hold Lasix given LARRY  -trend creatinine

## 2022-09-24 NOTE — ASSESSMENT & PLAN NOTE
Wt Readings from Last 3 Encounters:   09/22/22 (!) 206 kg (454 lb 2 4 oz)   07/25/22 (!) 201 kg (442 lb 7 4 oz)   06/14/22 (!) 211 kg (464 lb 8 2 oz)     · Clinically without exacerbation  · Hold Lasix given LARRY  · Continue Coumadin for anticoagulation purposes  · Monitor daily weights, input and output

## 2022-09-24 NOTE — PLAN OF CARE
Problem: PHYSICAL THERAPY ADULT  Goal: Performs mobility at highest level of function for planned discharge setting  See evaluation for individualized goals  Description:            See flowsheet documentation for full assessment, interventions and recommendations  9/24/2022 1149 by Rica Fry PT  Outcome: Completed  Note: Prognosis: Fair  Problem List: Decreased strength, Decreased endurance, Impaired balance, Decreased mobility, Decreased coordination, Obesity, Pain  Assessment: Pt is 71 y o  female seen for PT evaluation s/p [facility] 9/22/2022 w/ Panniculitis  PT consulted to assess pt's functional mobility and d/c needs  Order placed for PT eval and tx, w/ up and OOB as tolerated order  Comorbidities affecting pt's physical performance at time of assessment include: weakness,panniculitis, LARRY on CKD, ambulatory dysfunction, chronic A-fib, morbid obesity, depression with anxiety,chronic diastolic CHF, osteoarthritis,anemia   PTA, pt was bedbound with in home aides  Personal factors affecting pt at time of IE include: anxiety, depression, unable to perform physical activity and limited insight into impairments  Please find objective findings from PT assessment regarding body systems outlined above with impairments and limitations including weakness, decreased endurance, impaired coordination, pain, decreased activity tolerance, decreased functional mobility tolerance, fall risk and decreased skin integrity  From PT/mobility standpoint, recommendation at time of d/c would be no rehabilitation needs with resumption of prior in home aides pending progress in order to facilitate return to PLOF  Acute PT interventions will be discontinued at this time as patient appears to be at functional baseline  PT Discharge Recommendation: No rehabilitation needs (resume prior caregivers)    See flowsheet documentation for full assessment

## 2022-09-24 NOTE — PLAN OF CARE
Problem: PHYSICAL THERAPY ADULT  Goal: Performs mobility at highest level of function for planned discharge setting  See evaluation for individualized goals  Description:            See flowsheet documentation for full assessment, interventions and recommendations  Note: Prognosis: Fair  Problem List: Decreased strength, Decreased endurance, Impaired balance, Decreased mobility, Decreased coordination, Obesity, Pain  Assessment: Pt is 71 y o  female seen for PT evaluation s/p admit to [facility] on 9/22/2022 w/ Panniculitis  PT consulted to assess pt's functional mobility and d/c needs  Order placed for PT eval and tx, w/ up and OOB as tolerated order  Comorbidities affecting pt's physical performance at time of assessment include: weakness,panniculitis, LARRY on CKD, ambulatory dysfunction, chronic A-fib, morbid obesity, depression with anxiety,chronic diastolic CHF, osteoarthritis,anemia   PTA, pt was bedbound with in home aides  Personal factors affecting pt at time of IE include: anxiety, depression, unable to perform physical activity and limited insight into impairments  Please find objective findings from PT assessment regarding body systems outlined above with impairments and limitations including weakness, decreased endurance, impaired coordination, pain, decreased activity tolerance, decreased functional mobility tolerance, fall risk and decreased skin integrity  From PT/mobility standpoint, recommendation at time of d/c would be no rehabilitation needs with resumption of prior in home aides pending progress in order to facilitate return to PLOF  Acute PT interventions will be discontinued at this time as patient appears to be at functional baseline  PT Discharge Recommendation: No rehabilitation needs (resume prior caregivers)    See flowsheet documentation for full assessment

## 2022-09-24 NOTE — PLAN OF CARE
Problem: Prexisting or High Potential for Compromised Skin Integrity  Goal: Skin integrity is maintained or improved  Description: INTERVENTIONS:  - Identify patients at risk for skin breakdown  - Assess and monitor skin integrity  - Assess and monitor nutrition and hydration status  - Monitor labs   - Assess for incontinence   - Turn and reposition patient  - Assist with mobility/ambulation  - Relieve pressure over bony prominences  - Avoid friction and shearing  - Provide appropriate hygiene as needed including keeping skin clean and dry  - Evaluate need for skin moisturizer/barrier cream  - Collaborate with interdisciplinary team   - Patient/family teaching  - Consider wound care consult   Outcome: Progressing     Problem: PAIN - ADULT  Goal: Verbalizes/displays adequate comfort level or baseline comfort level  Description: Interventions:  - Encourage patient to monitor pain and request assistance  - Assess pain using appropriate pain scale  - Administer analgesics based on type and severity of pain and evaluate response  - Implement non-pharmacological measures as appropriate and evaluate response  - Consider cultural and social influences on pain and pain management  - Notify physician/advanced practitioner if interventions unsuccessful or patient reports new pain  Outcome: Progressing     Problem: INFECTION - ADULT  Goal: Absence or prevention of progression during hospitalization  Description: INTERVENTIONS:  - Assess and monitor for signs and symptoms of infection  - Monitor lab/diagnostic results  - Monitor all insertion sites, i e  indwelling lines, tubes, and drains  - Monitor endotracheal if appropriate and nasal secretions for changes in amount and color  - [place] appropriate cooling/warming therapies per order  - Administer medications as ordered  - Instruct and encourage patient and family to use good hand hygiene technique  - Identify and instruct in appropriate isolation precautions for identified infection/condition  Outcome: Progressing  Goal: Absence of fever/infection during neutropenic period  Description: INTERVENTIONS:  - Monitor WBC    Outcome: Progressing     Problem: SAFETY ADULT  Goal: Patient will remain free of falls  Description: INTERVENTIONS:  - Educate patient/family on patient safety including physical limitations  - Instruct patient to call for assistance with activity   - Consult OT/PT to assist with strengthening/mobility   - Keep Call bell within reach  - Keep bed low and locked with side rails adjusted as appropriate  - Keep care items and personal belongings within reach  - Initiate and maintain comfort rounds  - Make Fall Risk Sign visible to staff  - Offer Toileting every 2 Hours, in advance of need  - Initiate/Maintain bed alarm  - Apply yellow socks and bracelet for high fall risk patients  - Consider moving patient to room near nurses station  Outcome: Progressing  Goal: Maintain or return to baseline ADL function  Description: INTERVENTIONS:  -  Assess patient's ability to carry out ADLs; assess patient's baseline for ADL function and identify physical deficits which impact ability to perform ADLs (bathing, care of mouth/teeth, toileting, grooming, dressing, etc )  - Assess/evaluate cause of self-care deficits   - Assess range of motion  - Assess patient's mobility; develop plan if impaired  - Assess patient's need for assistive devices and provide as appropriate  - Encourage maximum independence but intervene and supervise when necessary  - Involve family in performance of ADLs  - Assess for home care needs following discharge   - Consider OT consult to assist with ADL evaluation and planning for discharge  - Provide patient education as appropriate  Outcome: Progressing  Goal: Maintains/Returns to pre admission functional level  Description: INTERVENTIONS:  - Perform BMAT or MOVE assessment daily    - Set and communicate daily mobility goal to care team and patient/family/caregiver  - Collaborate with rehabilitation services on mobility goals if consulted  - Perform Range of Motion 3 times a day  - Reposition patient every 2 hours  - Record patient progress and toleration of activity level   Outcome: Progressing     Problem: DISCHARGE PLANNING  Goal: Discharge to home or other facility with appropriate resources  Description: INTERVENTIONS:  - Identify barriers to discharge w/patient and caregiver  - Arrange for needed discharge resources and transportation as appropriate  - Identify discharge learning needs (meds, wound care, etc )  - Arrange for interpretive services to assist at discharge as needed  - Refer to Case Management Department for coordinating discharge planning if the patient needs post-hospital services based on physician/advanced practitioner order or complex needs related to functional status, cognitive ability, or social support system  Outcome: Progressing     Problem: Knowledge Deficit  Goal: Patient/family/caregiver demonstrates understanding of disease process, treatment plan, medications, and discharge instructions  Description: Complete learning assessment and assess knowledge base    Interventions:  - Provide teaching at level of understanding  - Provide teaching via preferred learning methods  Outcome: Progressing     Problem: GASTROINTESTINAL - ADULT  Goal: Minimal or absence of nausea and/or vomiting  Description: INTERVENTIONS:  - Administer IV fluids if ordered to ensure adequate hydration  - Maintain NPO status until nausea and vomiting are resolved  - Nasogastric tube if ordered  - Administer ordered antiemetic medications as needed  - Provide nonpharmacologic comfort measures as appropriate  - Advance diet as tolerated, if ordered  - Consider nutrition services referral to assist patient with adequate nutrition and appropriate food choices  Outcome: Progressing  Goal: Maintains or returns to baseline bowel function  Description: INTERVENTIONS:  - Assess bowel function  - Encourage oral fluids to ensure adequate hydration  - Administer IV fluids if ordered to ensure adequate hydration  - Administer ordered medications as needed  - Encourage mobilization and activity  - Consider nutritional services referral to assist patient with adequate nutrition and appropriate food choices  Outcome: Progressing  Goal: Maintains adequate nutritional intake  Description: INTERVENTIONS:  - Monitor percentage of each meal consumed  - Identify factors contributing to decreased intake, treat as appropriate  - Assist with meals as needed  - Monitor I&O, weight, and lab values if indicated  - Obtain nutrition services referral as needed  Outcome: Progressing  Goal: Establish and maintain optimal ostomy function  Description: INTERVENTIONS:  - Assess bowel function  - Encourage oral fluids to ensure adequate hydration  - Administer IV fluids if ordered to ensure adequate hydration   - Administer ordered medications as needed  - Encourage mobilization and activity  - Nutrition services referral to assist patient with appropriate food choices  - Assess stoma site  - Consider wound care consult   Outcome: Progressing  Goal: Oral mucous membranes remain intact  Description: INTERVENTIONS  - Assess oral mucosa and hygiene practices  - Implement preventative oral hygiene regimen  - Implement oral medicated treatments as ordered  - Initiate Nutrition services referral as needed  Outcome: Progressing     Problem: METABOLIC, FLUID AND ELECTROLYTES - ADULT  Goal: Electrolytes maintained within normal limits  Description: INTERVENTIONS:  - Monitor labs and assess patient for signs and symptoms of electrolyte imbalances  - Administer electrolyte replacement as ordered  - Monitor response to electrolyte replacements, including repeat lab results as appropriate  - Instruct patient on fluid and nutrition as appropriate  Outcome: Progressing  Goal: Fluid balance maintained  Description: INTERVENTIONS:  - Monitor labs   - Monitor I/O and WT  - Instruct patient on fluid and nutrition as appropriate  - Assess for signs & symptoms of volume excess or deficit  Outcome: Progressing  Goal: Glucose maintained within target range  Description: INTERVENTIONS:  - Monitor Blood Glucose as ordered  - Assess for signs and symptoms of hyperglycemia and hypoglycemia  - Administer ordered medications to maintain glucose within target range  - Assess nutritional intake and initiate nutrition service referral as needed  Outcome: Progressing     Problem: SKIN/TISSUE INTEGRITY - ADULT  Goal: Skin Integrity remains intact(Skin Breakdown Prevention)  Description: Assess:  -Perform Gucci assessment every shift  -Clean and moisturize skin every pravin  -Inspect skin when repositioning, toileting, and assisting with ADLS  -Assess extremities for adequate circulation and sensation     Bed Management:  -Have minimal linens on bed & keep smooth, unwrinkled  -Change linens as needed when moist or perspiring  -Avoid sitting or lying in one position for more than 2 hours while in bed    Toileting:  -Offer bedside commode  -Assess for incontinence every 2 hours  -Use incontinent care products after each incontinent episode such as hydraguard    Activity:  -Encourage or provide ROM exercises   -Turn and reposition patient every 2 Hours  -Use appropriate equipment to lift or move patient in bed      Skin Care:  -Avoid use of baby powder, tape, friction and shearing, hot water or constrictive clothing  -Do not massage red bony areas    Outcome: Progressing  Goal: Incision(s), wounds(s) or drain site(s) healing without S/S of infection  Description: INTERVENTIONS  - Assess and document dressing, incision, wound bed, drain sites and surrounding tissue  - Provide patient and family education  - Perform skin care/dressing changes every day  Outcome: Progressing  Goal: Pressure injury heals and does not worsen  Description: Interventions:  - Implement low air loss mattress or specialty surface (Criteria met)  - Apply silicone foam dressing  - Perform passive or active ROM every shift  - Turn and reposition patient & offload bony prominences every 2 hours   - Utilize friction reducing device or surface for transfers   - Consider consults to  interdisciplinary teams such as wound care  - Use incontinent care products after each incontinent episode such as warm wipes  - Consider nutrition services referral as needed  Outcome: Progressing     Problem: MUSCULOSKELETAL - ADULT  Goal: Maintain or return mobility to safest level of function  Description: INTERVENTIONS:  - Assess patient's ability to carry out ADLs; assess patient's baseline for ADL function and identify physical deficits which impact ability to perform ADLs (bathing, care of mouth/teeth, toileting, grooming, dressing, etc )  - Assess/evaluate cause of self-care deficits   - Assess range of motion  - Assess patient's mobility  - Assess patient's need for assistive devices and provide as appropriate  - Encourage maximum independence but intervene and supervise when necessary  - Involve family in performance of ADLs  - Assess for home care needs following discharge   - Consider OT consult to assist with ADL evaluation and planning for discharge  - Provide patient education as appropriate  Outcome: Progressing  Goal: Maintain proper alignment of affected body part  Description: INTERVENTIONS:  - Support, maintain and protect limb and body alignment  - Provide patient/ family with appropriate education  Outcome: Progressing     Problem: MOBILITY - ADULT  Goal: Maintain or return to baseline ADL function  Description: INTERVENTIONS:  -  Assess patient's ability to carry out ADLs; assess patient's baseline for ADL function and identify physical deficits which impact ability to perform ADLs (bathing, care of mouth/teeth, toileting, grooming, dressing, etc )  - Assess/evaluate cause of self-care deficits   - Assess range of motion  - Assess patient's mobility; develop plan if impaired  - Assess patient's need for assistive devices and provide as appropriate  - Encourage maximum independence but intervene and supervise when necessary  - Involve family in performance of ADLs  - Assess for home care needs following discharge   - Consider OT consult to assist with ADL evaluation and planning for discharge  - Provide patient education as appropriate  Outcome: Progressing  Goal: Maintains/Returns to pre admission functional level  Description: INTERVENTIONS:  - Perform BMAT or MOVE assessment daily    - Set and communicate daily mobility goal to care team and patient/family/caregiver  - Collaborate with rehabilitation services on mobility goals if consulted  - Perform Range of Motion 2-3 times a day  - Reposition patient every 2 hours    - Out of bed for toileting  - Record patient progress and toleration of activity level   Outcome: Progressing

## 2022-09-25 ENCOUNTER — APPOINTMENT (OUTPATIENT)
Dept: RADIOLOGY | Facility: HOSPITAL | Age: 72
DRG: 603 | End: 2022-09-25
Payer: MEDICARE

## 2022-09-25 LAB
ALBUMIN SERPL BCP-MCNC: 2.9 G/DL (ref 3.5–5)
ALP SERPL-CCNC: 101 U/L (ref 34–104)
ALT SERPL W P-5'-P-CCNC: 8 U/L (ref 7–52)
ANION GAP SERPL CALCULATED.3IONS-SCNC: 5 MMOL/L (ref 4–13)
AST SERPL W P-5'-P-CCNC: 15 U/L (ref 13–39)
BASOPHILS # BLD AUTO: 0.05 THOUSANDS/ΜL (ref 0–0.1)
BASOPHILS NFR BLD AUTO: 1 % (ref 0–1)
BILIRUB SERPL-MCNC: 0.64 MG/DL (ref 0.2–1)
BUN SERPL-MCNC: 28 MG/DL (ref 5–25)
CALCIUM ALBUM COR SERPL-MCNC: 9.7 MG/DL (ref 8.3–10.1)
CALCIUM SERPL-MCNC: 8.8 MG/DL (ref 8.4–10.2)
CHLORIDE SERPL-SCNC: 98 MMOL/L (ref 96–108)
CO2 SERPL-SCNC: 32 MMOL/L (ref 21–32)
CREAT SERPL-MCNC: 1.44 MG/DL (ref 0.6–1.3)
EOSINOPHIL # BLD AUTO: 0.37 THOUSAND/ΜL (ref 0–0.61)
EOSINOPHIL NFR BLD AUTO: 4 % (ref 0–6)
ERYTHROCYTE [DISTWIDTH] IN BLOOD BY AUTOMATED COUNT: 16.9 % (ref 11.6–15.1)
GFR SERPL CREATININE-BSD FRML MDRD: 36 ML/MIN/1.73SQ M
GLUCOSE SERPL-MCNC: 100 MG/DL (ref 65–140)
HCT VFR BLD AUTO: 33.3 % (ref 34.8–46.1)
HGB BLD-MCNC: 9.9 G/DL (ref 11.5–15.4)
IMM GRANULOCYTES # BLD AUTO: 0.05 THOUSAND/UL (ref 0–0.2)
IMM GRANULOCYTES NFR BLD AUTO: 1 % (ref 0–2)
LYMPHOCYTES # BLD AUTO: 1.32 THOUSANDS/ΜL (ref 0.6–4.47)
LYMPHOCYTES NFR BLD AUTO: 14 % (ref 14–44)
MAGNESIUM SERPL-MCNC: 2 MG/DL (ref 1.9–2.7)
MCH RBC QN AUTO: 25.3 PG (ref 26.8–34.3)
MCHC RBC AUTO-ENTMCNC: 29.7 G/DL (ref 31.4–37.4)
MCV RBC AUTO: 85 FL (ref 82–98)
MONOCYTES # BLD AUTO: 0.59 THOUSAND/ΜL (ref 0.17–1.22)
MONOCYTES NFR BLD AUTO: 6 % (ref 4–12)
NEUTROPHILS # BLD AUTO: 7.16 THOUSANDS/ΜL (ref 1.85–7.62)
NEUTS SEG NFR BLD AUTO: 74 % (ref 43–75)
NRBC BLD AUTO-RTO: 0 /100 WBCS
PLATELET # BLD AUTO: 395 THOUSANDS/UL (ref 149–390)
PMV BLD AUTO: 8.9 FL (ref 8.9–12.7)
POTASSIUM SERPL-SCNC: 4 MMOL/L (ref 3.5–5.3)
PROT SERPL-MCNC: 7.1 G/DL (ref 6.4–8.4)
RBC # BLD AUTO: 3.91 MILLION/UL (ref 3.81–5.12)
SODIUM SERPL-SCNC: 135 MMOL/L (ref 135–147)
WBC # BLD AUTO: 9.54 THOUSAND/UL (ref 4.31–10.16)

## 2022-09-25 PROCEDURE — NC001 PR NO CHARGE

## 2022-09-25 PROCEDURE — 83735 ASSAY OF MAGNESIUM: CPT | Performed by: STUDENT IN AN ORGANIZED HEALTH CARE EDUCATION/TRAINING PROGRAM

## 2022-09-25 PROCEDURE — 99222 1ST HOSP IP/OBS MODERATE 55: CPT

## 2022-09-25 PROCEDURE — 80053 COMPREHEN METABOLIC PANEL: CPT | Performed by: STUDENT IN AN ORGANIZED HEALTH CARE EDUCATION/TRAINING PROGRAM

## 2022-09-25 PROCEDURE — 85025 COMPLETE CBC W/AUTO DIFF WBC: CPT | Performed by: STUDENT IN AN ORGANIZED HEALTH CARE EDUCATION/TRAINING PROGRAM

## 2022-09-25 PROCEDURE — 99232 SBSQ HOSP IP/OBS MODERATE 35: CPT | Performed by: STUDENT IN AN ORGANIZED HEALTH CARE EDUCATION/TRAINING PROGRAM

## 2022-09-25 PROCEDURE — 73030 X-RAY EXAM OF SHOULDER: CPT

## 2022-09-25 RX ADMIN — LEVOFLOXACIN 750 MG: 750 TABLET, FILM COATED ORAL at 19:44

## 2022-09-25 RX ADMIN — DULOXETINE 20 MG: 20 CAPSULE, DELAYED RELEASE ORAL at 08:29

## 2022-09-25 RX ADMIN — WARFARIN SODIUM 4 MG: 2 TABLET ORAL at 17:30

## 2022-09-25 RX ADMIN — POLYETHYLENE GLYCOL 3350 17 G: 17 POWDER, FOR SOLUTION ORAL at 08:29

## 2022-09-25 RX ADMIN — Medication 250 MG: at 17:31

## 2022-09-25 RX ADMIN — ACETAMINOPHEN 650 MG: 325 TABLET ORAL at 04:26

## 2022-09-25 RX ADMIN — VANCOMYCIN HYDROCHLORIDE 125 MG: 125 CAPSULE ORAL at 00:32

## 2022-09-25 RX ADMIN — SENNOSIDES AND DOCUSATE SODIUM 1 TABLET: 50; 8.6 TABLET ORAL at 21:55

## 2022-09-25 RX ADMIN — LEVOTHYROXINE SODIUM 125 MCG: 25 TABLET ORAL at 05:01

## 2022-09-25 RX ADMIN — VANCOMYCIN HYDROCHLORIDE 125 MG: 125 CAPSULE ORAL at 13:10

## 2022-09-25 RX ADMIN — ALLOPURINOL 100 MG: 100 TABLET ORAL at 08:29

## 2022-09-25 RX ADMIN — Medication 250 MG: at 08:29

## 2022-09-25 NOTE — ASSESSMENT & PLAN NOTE
· Left pannus/hip/thigh cellulitis - recurrent  · Continue Levaquin  · Will follow-up on the blood culture  · Will consider an ID evaluation  · Consult general surgery  · Of the patient reports having an outpatient evaluation set up Plastic surgery October 5, 2022 but is concerned she may miss it since here caregiver is unavailable to take care of her     Patient's caretakers currently on medical leave will need placement into SNF once medically stable

## 2022-09-25 NOTE — ASSESSMENT & PLAN NOTE
Lab Results   Component Value Date    EGFR 36 09/25/2022    EGFR 36 09/24/2022    EGFR 50 09/22/2022    CREATININE 1 44 (H) 09/25/2022    CREATININE 1 43 (H) 09/24/2022    CREATININE 1 11 09/22/2022       -Slight LARRY BMP today  -hold Lasix given LARRY  -trend creatinine  -check renal ultrasound

## 2022-09-25 NOTE — CONSULTS
Consultation - General Surgery   Liyah Bello 71 y o  female MRN: [id number]  Unit/Bed#: -01 Encounter: [id number]    Assessment/Plan     Assessment:  Nonhealing superficial pressure wound underneath right pannus  Measures 3 x 2 5 x 0 1 cm  Panniculitis, recurrent  Photo was reviewed on the media tab in Epic, discussed with Dr Polly George  Erythema underneath the large right abdominal fold actually appears improved from previous examinations, patient known to General surgery in past at [facility]  No sign of any necrotizing skin changes  Significant profound morbid obesity, weight 206 kg (BMI 80 45)  Acute kidney injury on CKD  History of chronic atrial fibrillation, patient on warfarin therapy, INR 2 3  History of C difficile infection  Ambulatory dysfunction  Chronic diastolic congestive heart failure  Depression  Hypothyroidism    WBC 9 54  Hemoglobin 9 9  Creatinine 1 44    Plan:  Continue local wound care to nonhealing pressure wound underneath right pannus to include Xeroform covered with ABD dressing  The patient states that she is allergic to Maxorb Ag  Claims it caused excoriation  Consult wound care nurse to see tomorrow  No plan for any general surgical intervention at this time, wound does not require debridement  Appears healthy with pink granulation tissue  General surgery can follow from the periphery as needed  History of Present Illness     HPI:  Liyah Bello is a 71 y o  female who presents with recurrent panniculitis, patient seen through the ED  She was placed on IV Levaquin  Blood cultures pending  General surgical consultation requested at this time for evaluation of a nonhealing ulcer underneath the right pannus with some clear serous drainage  Patient does have a scheduled outpatient evaluation by Plastic surgery on October 5th  Xeroform has been applied at home to the nonhealing ulcer underneath the abdominal fold on the right side    She says that she previously had a reaction with skin excoriation with prior application of Maxorb Ag which she does not want again  Consults    Review of Systems   Constitutional: Negative  HENT: Negative  Eyes: Negative  Respiratory: Negative  Cardiovascular: Negative  Gastrointestinal: Negative for abdominal pain  Endocrine: Negative  Genitourinary: Negative for difficulty urinating  Musculoskeletal: Positive for gait problem  Negative for joint swelling  Skin: Positive for rash and wound  Allergic/Immunologic: Negative  Hematological: Negative  Psychiatric/Behavioral: Negative           Historical Information   Past Medical History:   Diagnosis Date    Atrial fibrillation ([place] [address] )     Bladder stone     C  difficile colitis     Cellulitis     CHF (congestive heart failure) ([place] [address] )     Depression with anxiety     Disease of thyroid gland     Diverticulitis     Enterocolitis     History of abnormal cervical Pap smear     Kidney stone     Lymphedema     Menopause     Age 53    Morbid obesity with BMI of 70 and over, adult ([place] [address] )     MRSA (methicillin resistant Staphylococcus aureus)     abdominal wound    Osteoarthritis     Phlebitis     left lower leg    Spondylolysis      Past Surgical History:   Procedure Laterality Date    APPENDECTOMY      CARPAL TUNNEL RELEASE      CHOLECYSTECTOMY      CYSTOSCOPY      stent    FOOT SURGERY  1982    bone spur    KNEE SURGERY      WISDOM TOOTH EXTRACTION       Social History   Social History     Substance and Sexual Activity   Alcohol Use Not Currently     Social History     Substance and Sexual Activity   Drug Use Not Currently    Types: Marijuana    Comment: As a teen - As per [employer]      E-Cigarette/Vaping    E-Cigarette Use Never User      E-Cigarette/Vaping Substances    Nicotine No     THC No     CBD No     Flavoring No     Other No     Unknown No      Social History     Tobacco Use   Smoking Status Former Smoker    Packs/day: 5 00    Years: 3 00    Pack years: 15 00    Start date: 1967   Nicole Kee Quit date: 1970    Years since quittin 2   Smokeless Tobacco Never Used   Tobacco Comment    5 packs/day until 6 (age [age]) - As per [employer]      Family History: non-contributory    Meds/Allergies   current meds:   Current Facility-Administered Medications   Medication Dose Route Frequency    acetaminophen (TYLENOL) tablet 650 mg  650 mg Oral Q8H PRN    allopurinol (ZYLOPRIM) tablet 100 mg  100 mg Oral Daily    DULoxetine (CYMBALTA) delayed release capsule 20 mg  20 mg Oral Daily    levofloxacin (LEVAQUIN) tablet 750 mg  750 mg Oral Q24H    levothyroxine tablet 125 mcg  125 mcg Oral Early Morning    ondansetron (ZOFRAN) injection 4 mg  4 mg Intravenous Q6H PRN    oxyCODONE (ROXICODONE) IR tablet 5 mg  5 mg Oral Q6H PRN    polyethylene glycol (MIRALAX) packet 17 g  17 g Oral Daily    saccharomyces boulardii (FLORASTOR) capsule 250 mg  250 mg Oral BID    senna-docusate sodium (SENOKOT S) 8 6-50 mg per tablet 1 tablet  1 tablet Oral HS    vancomycin (VANCOCIN) capsule 125 mg  125 mg Oral Q12H    warfarin (COUMADIN) tablet 2 mg  2 mg Oral Once per day on     warfarin (COUMADIN) tablet 4 mg  4 mg Oral Once per day on Sun Tue Thu Sat     Allergies   Allergen Reactions    Augmentin Es-600 [Amoxicillin-Pot Clavulanate] Anaphylaxis and Rash    Bactrim [Sulfamethoxazole-Trimethoprim] Anaphylaxis    Penicillins Anaphylaxis and Rash     Tolerates cefepime (21)    Other Rash, Irritability and Edema     Patient reports significant reaction to the use of Max Orb in the abdominal folds leading to swelling, excoriation, maceration and weeping of the skin       Omeprazole GI Intolerance    Sulfa Antibiotics Hives    Latex Rash and Edema    Vitamin C [Ascorbate - Food Allergy] Rash       Objective   First Vitals:   Blood Pressure: 120/56 (22 1740)  Pulse: 67 (22 1740)  Temperature: 97 7 °F (36 5 °C) (09/22/22 1740)  Temp Source: Oral (09/22/22 1740)  Respirations: 18 (09/22/22 1740)  Height: 5' 3" (160 cm) (09/22/22 1740)  Weight - Scale: (!) 206 kg (454 lb 2 4 oz) (09/22/22 1740)  SpO2: 96 % (09/22/22 1740)    Current Vitals:   Blood Pressure: 119/63 (09/25/22 0707)  Pulse: 77 (09/25/22 0707)  Temperature: 98 5 °F (36 9 °C) (09/25/22 0707)  Temp Source: Oral (09/23/22 2312)  Respirations: 18 (09/25/22 0707)  Height: 5' 3" (160 cm) (09/22/22 1740)  Weight - Scale: (!) 206 kg (454 lb 2 4 oz) (09/22/22 1740)  SpO2: 91 % (09/25/22 1000)      Intake/Output Summary (Last 24 hours) at 9/25/2022 1343  Last data filed at 9/24/2022 2100  Gross per 24 hour   Intake 480 ml   Output 300 ml   Net 180 ml       Invasive Devices  Report    Peripheral Intravenous Line  Duration           Peripheral IV 09/22/22 Right Antecubital 2 days              Physical Exam  Constitutional:       Appearance: She is obese  She is ill-appearing  She is not toxic-appearing  HENT:      Head: Normocephalic  Nose: Nose normal       Mouth/Throat:      Mouth: Mucous membranes are moist    Eyes:      Conjunctiva/sclera: Conjunctivae normal    Cardiovascular:      Rate and Rhythm: Normal rate and regular rhythm  Heart sounds: Normal heart sounds  No murmur heard  No gallop  Pulmonary:      Effort: No respiratory distress  Breath sounds: No wheezing or rales  Abdominal:      Palpations: Abdomen is soft  There is no mass  Tenderness: There is no abdominal tenderness  There is no guarding or rebound  Hernia: No hernia is present  Comments: Massive abdominal girth  Abdominal pannus had to be held up by a 2nd person for inspection  The patient has a 3 x 2 5 x 0 1 cm superficial round nonhealing wound in the right medial lower abdomen underneath the pannus just lateral to the right mons pubis which appears to be noninfected    The erythema underneath the right pannus is actually improved upon comparison from previous examination by these providers  There is some clear serous drainage from the wound  Xeroform and ABD applied to the area  Musculoskeletal:         General: Deformity present  No tenderness  Cervical back: Normal range of motion  Right lower leg: Edema present  Left lower leg: Edema present  Skin:     Capillary Refill: Capillary refill takes less than 2 seconds  Findings: Rash present  Neurological:      Mental Status: She is oriented to person, place, and time  Motor: Weakness present  Psychiatric:         Mood and Affect: Mood normal          Behavior: Behavior normal          Thought Content: Thought content normal          Judgment: Judgment normal        Lab Results:   I have personally reviewed pertinent lab results  , CBC:   Lab Results   Component Value Date    WBC 9 54 09/25/2022    HGB 9 9 (L) 09/25/2022    HCT 33 3 (L) 09/25/2022    MCV 85 09/25/2022     (H) 09/25/2022    MCH 25 3 (L) 09/25/2022    MCHC 29 7 (L) 09/25/2022    RDW 16 9 (H) 09/25/2022    MPV 8 9 09/25/2022    NRBC 0 09/25/2022   , CMP:   Lab Results   Component Value Date    SODIUM 135 09/25/2022    K 4 0 09/25/2022    CL 98 09/25/2022    CO2 32 09/25/2022    BUN 28 (H) 09/25/2022    CREATININE 1 44 (H) 09/25/2022    CALCIUM 8 8 09/25/2022    AST 15 09/25/2022    ALT 8 09/25/2022    ALKPHOS 101 09/25/2022    EGFR 36 09/25/2022   , Coagulation: No results found for: PT, INR, APTT     Imaging: I have personally reviewed pertinent films in PACS  EKG, Pathology, and Other Studies: I have personally reviewed pertinent reports  Counseling / Coordination of Care  Total floor / unit time spent today 30 minutes  Greater than 50% of total time was spent with the patient and / or family counseling and / or coordination of care    A description of the counseling / coordination of care:  Review of laboratory studies, examination patient, discussion with Dr Morelia Dominguez and review of existing orders        Monroe Brady PA-C

## 2022-09-25 NOTE — PLAN OF CARE
Problem: Prexisting or High Potential for Compromised Skin Integrity  Goal: Skin integrity is maintained or improved  Description: INTERVENTIONS:  - Identify patients at risk for skin breakdown  - Assess and monitor skin integrity  - Assess and monitor nutrition and hydration status  - Monitor labs   - Assess for incontinence   - Turn and reposition patient  - Assist with mobility/ambulation  - Relieve pressure over bony prominences  - Avoid friction and shearing  - Provide appropriate hygiene as needed including keeping skin clean and dry  - Evaluate need for skin moisturizer/barrier cream  - Collaborate with interdisciplinary team   - Patient/family teaching  - Consider wound care consult   Outcome: Progressing     Problem: PAIN - ADULT  Goal: Verbalizes/displays adequate comfort level or baseline comfort level  Description: Interventions:  - Encourage patient to monitor pain and request assistance  - Assess pain using appropriate pain scale  - Administer analgesics based on type and severity of pain and evaluate response  - Implement non-pharmacological measures as appropriate and evaluate response  - Consider cultural and social influences on pain and pain management  - Notify physician/advanced practitioner if interventions unsuccessful or patient reports new pain  Outcome: Progressing     Problem: INFECTION - ADULT  Goal: Absence or prevention of progression during hospitalization  Description: INTERVENTIONS:  - Assess and monitor for signs and symptoms of infection  - Monitor lab/diagnostic results  - Monitor all insertion sites, i e  indwelling lines, tubes, and drains  - Monitor endotracheal if appropriate and nasal secretions for changes in amount and color  - Eldena appropriate cooling/warming therapies per order  - Administer medications as ordered  - Instruct and encourage patient and family to use good hand hygiene technique  - Identify and instruct in appropriate isolation precautions for identified infection/condition  Outcome: Progressing     Problem: SAFETY ADULT  Goal: Patient will remain free of falls  Description: INTERVENTIONS:  - Educate patient/family on patient safety including physical limitations  - Instruct patient to call for assistance with activity   - Consult OT/PT to assist with strengthening/mobility   - Keep Call bell within reach  - Keep bed low and locked with side rails adjusted as appropriate  - Keep care items and personal belongings within reach  - Initiate and maintain comfort rounds  - Make Fall Risk Sign visible to staff  - Offer Toileting in advance of need  - Initiate/Maintain bed alarm  - Obtain necessary fall risk management equipment:   - Apply yellow socks and bracelet for high fall risk patients  - Consider moving patient to room near nurses station  Outcome: Progressing  Goal: Maintain or return to baseline ADL function  Description: INTERVENTIONS:  -  Assess patient's ability to carry out ADLs; assess patient's baseline for ADL function and identify physical deficits which impact ability to perform ADLs (bathing, care of mouth/teeth, toileting, grooming, dressing, etc )  - Assess/evaluate cause of self-care deficits   - Assess range of motion  - Assess patient's mobility; develop plan if impaired  - Assess patient's need for assistive devices and provide as appropriate  - Encourage maximum independence but intervene and supervise when necessary  - Involve family in performance of ADLs  - Assess for home care needs following discharge   - Consider OT consult to assist with ADL evaluation and planning for discharge  - Provide patient education as appropriate  Outcome: Progressing  Goal: Maintains/Returns to pre admission functional level  Description: INTERVENTIONS:  - Perform BMAT or MOVE assessment daily    - Set and communicate daily mobility goal to care team and patient/family/caregiver     - Collaborate with rehabilitation services on mobility goals if consulted  - Record patient progress and toleration of activity level   Outcome: Progressing     Problem: DISCHARGE PLANNING  Goal: Discharge to home or other facility with appropriate resources  Description: INTERVENTIONS:  - Identify barriers to discharge w/patient and caregiver  - Arrange for needed discharge resources and transportation as appropriate  - Identify discharge learning needs (meds, wound care, etc )  - Refer to Case Management Department for coordinating discharge planning if the patient needs post-hospital services based on physician/advanced practitioner order or complex needs related to functional status, cognitive ability, or social support system  Outcome: Progressing     Problem: Knowledge Deficit  Goal: Patient/family/caregiver demonstrates understanding of disease process, treatment plan, medications, and discharge instructions  Description: Complete learning assessment and assess knowledge base    Interventions:  - Provide teaching at level of understanding  - Provide teaching via preferred learning methods  Outcome: Progressing     Problem: GASTROINTESTINAL - ADULT  Goal: Minimal or absence of nausea and/or vomiting  Description: INTERVENTIONS:  - Administer IV fluids if ordered to ensure adequate hydration  - Maintain NPO status until nausea and vomiting are resolved  - Nasogastric tube if ordered  - Administer ordered antiemetic medications as needed  - Provide nonpharmacologic comfort measures as appropriate  - Advance diet as tolerated, if ordered  - Consider nutrition services referral to assist patient with adequate nutrition and appropriate food choices  Outcome: Progressing  Goal: Maintains or returns to baseline bowel function  Description: INTERVENTIONS:  - Assess bowel function  - Encourage oral fluids to ensure adequate hydration  - Administer IV fluids if ordered to ensure adequate hydration  - Administer ordered medications as needed  - Encourage mobilization and activity  - Consider nutritional services referral to assist patient with adequate nutrition and appropriate food choices  Outcome: Progressing  Goal: Maintains adequate nutritional intake  Description: INTERVENTIONS:  - Monitor percentage of each meal consumed  - Identify factors contributing to decreased intake, treat as appropriate  - Assist with meals as needed  - Monitor I&O, weight, and lab values if indicated  - Obtain nutrition services referral as needed  Outcome: Progressing  Goal: Establish and maintain optimal ostomy function  Description: INTERVENTIONS:  - Assess bowel function  - Encourage oral fluids to ensure adequate hydration  - Administer IV fluids if ordered to ensure adequate hydration   - Administer ordered medications as needed  - Encourage mobilization and activity  - Nutrition services referral to assist patient with appropriate food choices  - Assess stoma site  - Consider wound care consult   Outcome: Progressing  Goal: Oral mucous membranes remain intact  Description: INTERVENTIONS  - Assess oral mucosa and hygiene practices  - Implement preventative oral hygiene regimen  - Implement oral medicated treatments as ordered  - Initiate Nutrition services referral as needed  Outcome: Progressing     Problem: METABOLIC, FLUID AND ELECTROLYTES - ADULT  Goal: Electrolytes maintained within normal limits  Description: INTERVENTIONS:  - Monitor labs and assess patient for signs and symptoms of electrolyte imbalances  - Administer electrolyte replacement as ordered  - Monitor response to electrolyte replacements, including repeat lab results as appropriate  - Instruct patient on fluid and nutrition as appropriate  Outcome: Progressing  Goal: Fluid balance maintained  Description: INTERVENTIONS:  - Monitor labs   - Monitor I/O and WT  - Instruct patient on fluid and nutrition as appropriate  - Assess for signs & symptoms of volume excess or deficit  Outcome: Progressing  Goal: Glucose maintained within target range  Description: INTERVENTIONS:  - Monitor Blood Glucose as ordered  - Assess for signs and symptoms of hyperglycemia and hypoglycemia  - Administer ordered medications to maintain glucose within target range  - Assess nutritional intake and initiate nutrition service referral as needed  Outcome: Progressing     Problem: SKIN/TISSUE INTEGRITY - ADULT  Goal: Skin Integrity remains intact(Skin Breakdown Prevention)  Description: Assess:  -Perform Gucci assessment every shift  -Clean and moisturize skin  -Inspect skin when repositioning, toileting, and assisting with ADLS  -Assess under medical devices   -Assess extremities for adequate circulation and sensation     Bed Management:  -Have minimal linens on bed & keep smooth, unwrinkled  -Change linens as needed when moist or perspiring  -Avoid sitting or lying in one position while in bed    Toileting:  -Offer bedside commode  -Assess for incontinence   -Use incontinent care products after each incontinent episode     Activity:  -Encourage or provide ROM exercises   -Turn and reposition patient every two Hours  -Use appropriate equipment to lift or move patient in bed  -Instruct/ Assist with weight shifting when out of bed in chair  -Consider limitation of chair time     Skin Care:  -Avoid use of baby powder, tape, friction and shearing, hot water or constrictive clothing  -Relieve pressure over bony prominences  -Do not massage red bony areas    Next Steps:  -Teach patient strategies to minimize risks   -Consider consults to  interdisciplinary teams   Outcome: Progressing  Goal: Incision(s), wounds(s) or drain site(s) healing without S/S of infection  Description: INTERVENTIONS  - Assess and document dressing, incision, wound bed, drain sites and surrounding tissue  - Provide patient and family education  - Perform skin care/dressing changes per order  Outcome: Progressing  Goal: Pressure injury heals and does not worsen  Description: Interventions:  - Implement low air loss mattress or specialty surface (Criteria met)  - Apply silicone foam dressing  - Instruct/assist with weight shifting   - Use special pressure reducing interventions   - Apply fecal or urinary incontinence containment device   - Perform passive or active ROM  - Turn and reposition patient & offload bony prominences    - Utilize friction reducing device or surface for transfers   - Consider consults to  interdisciplinary teams  - Use incontinent care products after each incontinent episode   - Consider nutrition services referral as needed  Outcome: Progressing     Problem: MUSCULOSKELETAL - ADULT  Goal: Maintain or return mobility to safest level of function  Description: INTERVENTIONS:  - Assess patient's ability to carry out ADLs; assess patient's baseline for ADL function and identify physical deficits which impact ability to perform ADLs (bathing, care of mouth/teeth, toileting, grooming, dressing, etc )  - Assess/evaluate cause of self-care deficits   - Assess range of motion  - Assess patient's mobility  - Assess patient's need for assistive devices and provide as appropriate  - Encourage maximum independence but intervene and supervise when necessary  - Involve family in performance of ADLs  - Assess for home care needs following discharge   - Consider OT consult to assist with ADL evaluation and planning for discharge  - Provide patient education as appropriate  Outcome: Progressing  Goal: Maintain proper alignment of affected body part  Description: INTERVENTIONS:  - Support, maintain and protect limb and body alignment  - Provide patient/ family with appropriate education  Outcome: Progressing     Problem: MOBILITY - ADULT  Goal: Maintain or return to baseline ADL function  Description: INTERVENTIONS:  -  Assess patient's ability to carry out ADLs; assess patient's baseline for ADL function and identify physical deficits which impact ability to perform ADLs (bathing, care of mouth/teeth, toileting, grooming, dressing, etc )  - Assess/evaluate cause of self-care deficits   - Assess range of motion  - Assess patient's mobility; develop plan if impaired  - Assess patient's need for assistive devices and provide as appropriate  - Encourage maximum independence but intervene and supervise when necessary  - Involve family in performance of ADLs  - Assess for home care needs following discharge   - Consider OT consult to assist with ADL evaluation and planning for discharge  - Provide patient education as appropriate  Outcome: Progressing  Goal: Maintains/Returns to pre admission functional level  Description: INTERVENTIONS:  - Perform BMAT or MOVE assessment daily    - Set and communicate daily mobility goal to care team and patient/family/caregiver     - Collaborate with rehabilitation services on mobility goals if consulted  - Record patient progress and toleration of activity level   Outcome: Progressing

## 2022-09-25 NOTE — PROGRESS NOTES
[facility]  Progress Note - Josh Blanc 1950, 71 y o  female MRN: [id number]  Unit/Bed#: -01 Encounter: [id number]  Primary Care Provider: Lindajo Crigler, MD   Date and time admitted to hospital: 9/22/2022  5:31 PM    * Panniculitis  Assessment & Plan  · Left pannus/hip/thigh cellulitis - recurrent  · Continue Levaquin  · Will follow-up on the blood culture  · Will consider an ID evaluation  · Consult general surgery  · Of the patient reports having an outpatient evaluation set up Plastic surgery October 5, 2022 but is concerned she may miss it since here caregiver is unavailable to take care of her     Patient's caretakers currently on medical leave will need placement into SNF once medically stable    Acute kidney injury superimposed on CKD [facility])  Assessment & Plan  Lab Results   Component Value Date    EGFR 36 09/25/2022    EGFR 36 09/24/2022    EGFR 50 09/22/2022    CREATININE 1 44 (H) 09/25/2022    CREATININE 1 43 (H) 09/24/2022    CREATININE 1 11 09/22/2022       -Slight LARRY BMP today  -hold Lasix given LARRY  -trend creatinine  -check renal ultrasound    BMI 70 and over, adult ([address] )  Assessment & Plan  · BMI 80 45  · Healthy diet recommended  · Consider referral to Bariatric Services post discharge    Chronic atrial fibrillation ([address] )  Assessment & Plan  · Continue Coumadin for anticoagulation purposes  · INR 2 3  · Rate is controlled    History of Clostridioides difficile infection  Assessment & Plan  · Continue oral vancomycin for C diff prophylaxis 125 mg p o  q 12 hours    Depression with anxiety  Assessment & Plan  · Continue Cymbalta    Ambulatory dysfunction  Assessment & Plan  · PT OT case management for discharge planning  · Patient reports that her caregiver is unavailable to take care of her  · Case management is working on placement    Chronic diastolic congestive heart failure ([facility] )  Assessment & Plan  Wt Readings from Last 3 Encounters: 22 (!) 206 kg (454 lb 2 4 oz)   22 (!) 201 kg (442 lb 7 4 oz)   22 (!) 211 kg (464 lb 8 2 oz)     · Clinically without exacerbation  · Hold Lasix given LARRY  · Continue Coumadin for anticoagulation purposes  · Monitor daily weights, input and output      Other specified hypothyroidism  Assessment & Plan  · Continue Levothyroxine          VTE Pharmacologic Prophylaxis: VTE Score: 7 High Risk (Score >/= 5) - Pharmacological DVT Prophylaxis Ordered: warfarin (Coumadin)  Sequential Compression Devices Ordered  Patient Centered Rounds: I performed bedside rounds with nursing staff today  Discussions with Specialists or Other Care Team Provider: general surgery    Education and Discussions with Family / Patient: Patient declined call to   Time Spent for Care: 45 minutes  More than 50% of total time spent on counseling and coordination of care as described above  Current Length of Stay: 2 day(s)  Current Patient Status: Inpatient   Certification Statement: The patient will continue to require additional inpatient hospital stay due to Macular use requiring general surgery consult, once medically stable patient will require placement to nursing home  Discharge Plan: Will require placement into skilled nursing facility    Code Status: Level 1 - Full Code    Subjective:   Patient seen and examined the bedside  Patient resting comfortably bed no apparent distress  No acute events overnight    Objective:     Vitals:   Temp (24hrs), Av °F (36 7 °C), Min:97 5 °F (36 4 °C), Max:98 5 °F (36 9 °C)    Temp:  [97 5 °F (36 4 °C)-98 5 °F (36 9 °C)] 98 5 °F (36 9 °C)  HR:  [69-77] 77  Resp:  [18] 18  BP: (119-120)/(58-63) 119/63  SpO2:  [87 %-95 %] 91 %  Body mass index is 80 45 kg/m²  Input and Output Summary (last 24 hours):      Intake/Output Summary (Last 24 hours) at 2022 1333  Last data filed at 2022 2100  Gross per 24 hour   Intake 480 ml   Output 300 ml   Net 180 ml Physical Exam:   Physical Exam  Constitutional:       Appearance: She is obese  HENT:      Head: Normocephalic  Cardiovascular:      Rate and Rhythm: Normal rate and regular rhythm  Pulses: Normal pulses  Heart sounds: Normal heart sounds  Pulmonary:      Effort: Pulmonary effort is normal       Breath sounds: Normal breath sounds  Abdominal:      General: Abdomen is flat  Bowel sounds are normal       Palpations: Abdomen is soft  Musculoskeletal:      Cervical back: Normal range of motion  Right lower leg: Edema present  Left lower leg: Edema present  Skin:     Comments: Panniculitis   Neurological:      General: No focal deficit present  Mental Status: She is alert and oriented to person, place, and time  Mental status is at baseline  Psychiatric:         Mood and Affect: Mood normal          Behavior: Behavior normal          Thought Content:  Thought content normal          Judgment: Judgment normal           Additional Data:     Labs:  Results from last 7 days   Lab Units 09/25/22  0451   WBC Thousand/uL 9 54   HEMOGLOBIN g/dL 9 9*   HEMATOCRIT % 33 3*   PLATELETS Thousands/uL 395*   NEUTROS PCT % 74   LYMPHS PCT % 14   MONOS PCT % 6   EOS PCT % 4     Results from last 7 days   Lab Units 09/25/22  0451   SODIUM mmol/L 135   POTASSIUM mmol/L 4 0   CHLORIDE mmol/L 98   CO2 mmol/L 32   BUN mg/dL 28*   CREATININE mg/dL 1 44*   ANION GAP mmol/L 5   CALCIUM mg/dL 8 8   ALBUMIN g/dL 2 9*   TOTAL BILIRUBIN mg/dL 0 64   ALK PHOS U/L 101   ALT U/L 8   AST U/L 15   GLUCOSE RANDOM mg/dL 100     Results from last 7 days   Lab Units 09/24/22  0437   INR  2 34*             Results from last 7 days   Lab Units 09/22/22  1809   LACTIC ACID mmol/L 1 4   PROCALCITONIN ng/ml 0 07       Lines/Drains:  Invasive Devices  Report    Peripheral Intravenous Line  Duration           Peripheral IV 09/22/22 Right Antecubital 2 days                      Imaging: No pertinent imaging reviewed  Recent Cultures (last 7 days):   Results from last 7 days   Lab Units 09/22/22  1809   BLOOD CULTURE  No Growth at 48 hrs  No Growth at 48 hrs  Last 24 Hours Medication List:   Current Facility-Administered Medications   Medication Dose Route Frequency Provider Last Rate    acetaminophen  650 mg Oral Q8H PRN Lakesha Chavira, MYA      allopurinol  100 mg Oral Daily Lakesha Chavira, MYA      DULoxetine  20 mg Oral Daily Lakesha Chavira, MYA      levofloxacin  750 mg Oral Q24H Gail Thomas DO      levothyroxine  125 mcg Oral Early Morning Lakesha Chavira, MYA      ondansetron  4 mg Intravenous Q6H PRN Kaye Gonzales PA-C      oxyCODONE  5 mg Oral Q6H PRN Lakesha Chavira, MYA      polyethylene glycol  17 g Oral Daily Zo Calabrese DO      saccharomyces boulardii  250 mg Oral BID Lakesha Chavira, MYA      senna-docusate sodium  1 tablet Oral HS Zo Calabrese DO      vancomycin  125 mg Oral Q12H Lakesha Chavira, MYA      warfarin  2 mg Oral Once per day on Mon Wed Fri Lakesha Chavira, MYA      warfarin  4 mg Oral Once per day on Sun Tue Thu Sat Lakesha Chavira, MYA          Today, Patient Was Seen By: Zo Calabrese DO    **Please Note: This note may have been constructed using a voice recognition system  **

## 2022-09-26 LAB
ALBUMIN SERPL BCP-MCNC: 2.9 G/DL (ref 3.5–5)
ALP SERPL-CCNC: 91 U/L (ref 34–104)
ALT SERPL W P-5'-P-CCNC: 6 U/L (ref 7–52)
ANION GAP SERPL CALCULATED.3IONS-SCNC: 5 MMOL/L (ref 4–13)
AST SERPL W P-5'-P-CCNC: 12 U/L (ref 13–39)
BASOPHILS # BLD AUTO: 0.04 THOUSANDS/ΜL (ref 0–0.1)
BASOPHILS NFR BLD AUTO: 0 % (ref 0–1)
BILIRUB SERPL-MCNC: 0.59 MG/DL (ref 0.2–1)
BUN SERPL-MCNC: 28 MG/DL (ref 5–25)
CALCIUM ALBUM COR SERPL-MCNC: 9.7 MG/DL (ref 8.3–10.1)
CALCIUM SERPL-MCNC: 8.8 MG/DL (ref 8.4–10.2)
CHLORIDE SERPL-SCNC: 100 MMOL/L (ref 96–108)
CO2 SERPL-SCNC: 31 MMOL/L (ref 21–32)
CREAT SERPL-MCNC: 1.39 MG/DL (ref 0.6–1.3)
EOSINOPHIL # BLD AUTO: 0.32 THOUSAND/ΜL (ref 0–0.61)
EOSINOPHIL NFR BLD AUTO: 3 % (ref 0–6)
ERYTHROCYTE [DISTWIDTH] IN BLOOD BY AUTOMATED COUNT: 16.7 % (ref 11.6–15.1)
FLUAV RNA RESP QL NAA+PROBE: NEGATIVE
FLUBV RNA RESP QL NAA+PROBE: NEGATIVE
GFR SERPL CREATININE-BSD FRML MDRD: 38 ML/MIN/1.73SQ M
GLUCOSE SERPL-MCNC: 102 MG/DL (ref 65–140)
HCT VFR BLD AUTO: 34.6 % (ref 34.8–46.1)
HGB BLD-MCNC: 10.3 G/DL (ref 11.5–15.4)
IMM GRANULOCYTES # BLD AUTO: 0.04 THOUSAND/UL (ref 0–0.2)
IMM GRANULOCYTES NFR BLD AUTO: 0 % (ref 0–2)
INR PPP: 2.08 (ref 0.84–1.19)
LYMPHOCYTES # BLD AUTO: 1.23 THOUSANDS/ΜL (ref 0.6–4.47)
LYMPHOCYTES NFR BLD AUTO: 13 % (ref 14–44)
MAGNESIUM SERPL-MCNC: 2 MG/DL (ref 1.9–2.7)
MCH RBC QN AUTO: 25.2 PG (ref 26.8–34.3)
MCHC RBC AUTO-ENTMCNC: 29.8 G/DL (ref 31.4–37.4)
MCV RBC AUTO: 85 FL (ref 82–98)
MONOCYTES # BLD AUTO: 0.53 THOUSAND/ΜL (ref 0.17–1.22)
MONOCYTES NFR BLD AUTO: 5 % (ref 4–12)
NEUTROPHILS # BLD AUTO: 7.65 THOUSANDS/ΜL (ref 1.85–7.62)
NEUTS SEG NFR BLD AUTO: 79 % (ref 43–75)
NRBC BLD AUTO-RTO: 0 /100 WBCS
PLATELET # BLD AUTO: 385 THOUSANDS/UL (ref 149–390)
PMV BLD AUTO: 8.4 FL (ref 8.9–12.7)
POTASSIUM SERPL-SCNC: 4.2 MMOL/L (ref 3.5–5.3)
PROT SERPL-MCNC: 6.8 G/DL (ref 6.4–8.4)
PROTHROMBIN TIME: 23.3 SECONDS (ref 11.6–14.5)
RBC # BLD AUTO: 4.08 MILLION/UL (ref 3.81–5.12)
RSV RNA RESP QL NAA+PROBE: NEGATIVE
SARS-COV-2 RNA RESP QL NAA+PROBE: NEGATIVE
SODIUM SERPL-SCNC: 136 MMOL/L (ref 135–147)
WBC # BLD AUTO: 9.81 THOUSAND/UL (ref 4.31–10.16)

## 2022-09-26 PROCEDURE — 85610 PROTHROMBIN TIME: CPT | Performed by: STUDENT IN AN ORGANIZED HEALTH CARE EDUCATION/TRAINING PROGRAM

## 2022-09-26 PROCEDURE — 80053 COMPREHEN METABOLIC PANEL: CPT | Performed by: STUDENT IN AN ORGANIZED HEALTH CARE EDUCATION/TRAINING PROGRAM

## 2022-09-26 PROCEDURE — 99233 SBSQ HOSP IP/OBS HIGH 50: CPT | Performed by: NURSE PRACTITIONER

## 2022-09-26 PROCEDURE — 83735 ASSAY OF MAGNESIUM: CPT | Performed by: STUDENT IN AN ORGANIZED HEALTH CARE EDUCATION/TRAINING PROGRAM

## 2022-09-26 PROCEDURE — 85025 COMPLETE CBC W/AUTO DIFF WBC: CPT | Performed by: STUDENT IN AN ORGANIZED HEALTH CARE EDUCATION/TRAINING PROGRAM

## 2022-09-26 PROCEDURE — 0241U HB NFCT DS VIR RESP RNA 4 TRGT: CPT | Performed by: NURSE PRACTITIONER

## 2022-09-26 RX ORDER — TRIAMCINOLONE ACETONIDE 1 MG/G
CREAM TOPICAL DAILY
Status: DISCONTINUED | OUTPATIENT
Start: 2022-09-26 | End: 2022-09-29 | Stop reason: HOSPADM

## 2022-09-26 RX ORDER — CEFUROXIME AXETIL 250 MG/1
500 TABLET ORAL EVERY 12 HOURS
Status: COMPLETED | OUTPATIENT
Start: 2022-09-26 | End: 2022-09-28

## 2022-09-26 RX ADMIN — LEVOTHYROXINE SODIUM 125 MCG: 25 TABLET ORAL at 05:30

## 2022-09-26 RX ADMIN — ALLOPURINOL 100 MG: 100 TABLET ORAL at 09:03

## 2022-09-26 RX ADMIN — DULOXETINE 20 MG: 20 CAPSULE, DELAYED RELEASE ORAL at 09:03

## 2022-09-26 RX ADMIN — VANCOMYCIN HYDROCHLORIDE 125 MG: 125 CAPSULE ORAL at 23:33

## 2022-09-26 RX ADMIN — VANCOMYCIN HYDROCHLORIDE 125 MG: 125 CAPSULE ORAL at 00:39

## 2022-09-26 RX ADMIN — POLYETHYLENE GLYCOL 3350 17 G: 17 POWDER, FOR SOLUTION ORAL at 09:04

## 2022-09-26 RX ADMIN — Medication 250 MG: at 17:46

## 2022-09-26 RX ADMIN — CEFUROXIME AXETIL 500 MG: 250 TABLET, FILM COATED ORAL at 20:21

## 2022-09-26 RX ADMIN — TRIAMCINOLONE ACETONIDE: 1 CREAM TOPICAL at 17:46

## 2022-09-26 RX ADMIN — VANCOMYCIN HYDROCHLORIDE 125 MG: 125 CAPSULE ORAL at 12:41

## 2022-09-26 RX ADMIN — WARFARIN SODIUM 2 MG: 2 TABLET ORAL at 17:47

## 2022-09-26 RX ADMIN — Medication 250 MG: at 09:03

## 2022-09-26 NOTE — CASE MANAGEMENT
Case Management Discharge Planning Note    Patient name Percy Cordova  Location [id number]/-[id number] MRN [id number]  : 1950 Date 2022       Current Admission Date: 2022  Current Admission Diagnosis:Panniculitis   Patient Active Problem List    Diagnosis Date Noted    BMI 70 and over, adult [facility]) 2022    Chronic atrial fibrillation ([facility] ) 2022    History of Clostridioides difficile infection 2022    Lymphedema of extremity 2021    Other specified hypothyroidism 10/03/2020    Mild episode of recurrent major depressive disorder ([facility] ) 10/03/2020    Idiopathic chronic 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 10/03/2020    Primary osteoarthritis involving multiple joints 10/03/2020    Chronic diastolic congestive heart failure ([facility] ) 10/03/2020    Panniculitis 10/03/2020    Gastroesophageal reflux disease without esophagitis 10/03/2020    Hx MRSA infection 2020    Functional gait abnormality 2019    Impaired mobility 2019    Osteoarthritis of glenohumeral joint 2019    Left ovarian cyst 2017    Depression with anxiety 07/15/2017    Anemia 2016    Ambulatory dysfunction 2016    Acute kidney injury superimposed on CKD ([facility] ) 2016    Adjustment disorder 2013    Irritable bowel syndrome 2012    Left atrial enlargement 2012    Left ventricular hypertrophy 2012    Carpal tunnel syndrome 2012    Gout 2012    Hyperlipidemia 2012    Symptomatic menopausal or female climacteric states 2012      LOS (days): 3  Geometric Mean LOS (GMLOS) (days): 3 20  Days to GMLOS:0 2     OBJECTIVE:  Risk of Unplanned Readmission Score: 44 35      Current admission status: Inpatient   Preferred Pharmacy:    [facility] [address]  [place] [address]  Phone: [phone] Fax: [address] [facility], [address] [facility] [address] [place] [address]  Phone: [phone] Fax: [phone]    Primary Care Provider: Kade Rivas MD    Primary Insurance: MEDICARE  Secondary Insurance: AARP    DISCHARGE DETAILS:    Discharge planning discussed with[de-identified] Patient  Freedom of Choice: Yes    Other Referral/Resources/Interventions Provided:  Interventions: SNF    Treatment Team Recommendation: Short Term Rehab         Additional Comments: Patient accepted at [facility]  Met with patient at bedside to discuss same  Transport scheduled for 1100am   Message to [address] requesting COVID test and updated on bed      Accepting Facility Name, [address] : [address]  Receiving Facility/Agency Phone Number: [phone]  Facility/Agency Fax Number: [phone]

## 2022-09-26 NOTE — DISCHARGE INSTR - OTHER ORDERS
Skin and Wound Care Plan:   1  Elevate heels off the bed with pillows   2  Turn and reposition every two hours  3  Hydraguard to bilateral heels, buttocks and sacrum BID and PRN  4  Skin nourishing cream daily  5  Cleanse abdominal fold wounds with soap and water, pat dry  Left lateral linear abdominal fold wound apply Kenalog cream and cover with 4X4, change dressing daily and PRN   Place Puracol Plus to non-healing wound to the mid abdominal fold, and right inner fold of the pannus, cover with 4X4, change daily and PRN

## 2022-09-26 NOTE — WOUND OSTOMY CARE
Consult Note - Wound   Lynita Habermann 71 y o  female MRN: [id number]  Unit/Bed#: -01 Encounter: [id number]      History and Present Illness:  71year old female patient admitted with panniculitis  Wound care consulted for chronic wound to the abdominal fold  She has a history of morbid obesity with a BMI of 80 45, chronic lymphedema  recurrent cellulitis, C-diff colitis, AFIB, CHF and hypothyroidism  Assessment Findings:   Patient in bed for assessment  She has a caretaker at home  She is on a bariatric bed  She is awake, alert and oriented  She is able to roll onto her side with minimal assistance  Patient is known to wound care nurse from prior admissions  She is able to feed herself, needs assistance with hygiene care  She is pleasant and cooperative  1  Sacrum, posterior knee folds, buttocks and heels intact  Scattered areas of blanchable hyperpigmented skin noted to the buttocks and posterior thighs   2  Right of center abdominal pannus with a chronic wound, granulation tissue in the wound bed, scant serosanguineous drainage when cleansed  Promogram placed in wound bed covered with 4X4  3  Two small areas on the lateral right and lateral left abdominal fold, MASD with partial thickness wounds  Left abdominal wound is pink and dry, scaly  Right abdominal wound has scant serosanguineous drainage when cleansed  Per patient request, Kenalog cream applied and covered with 4X4   4  Left lateral hip-thigh area of erythema and drainage  Patient has recurrent cellulitis to this area  At this time the patient states she has drainage, not draining at time of assessment  Area is firm with cobblestone appearance to the skin  Incontinence pad placed on left hip-thigh    Wound and Skin Care Plan:   1  Elevate heels off the bed with pillows   2  Turn and reposition every two hours  3  Hydraguard to bilateral heels, buttocks and sacrum BID and PRN  4  Skin nourishing cream daily  5   Cleanse abdominal fold wounds with soap and water, pat dry  Right and left lateral linear abdominal fold wounds apply Kenalog cream and cover with 4X4, change dressings daily and PRN  Place Promogran to non-healing wound to the mid abdominal fold, cover with 4X4, change daily and PRN    Wounds:  Wound 05/28/22 Cellulitis Abdomen Right; Lower (Active)   Wound Image   09/26/22 1151   Wound Description Beefy red;Fragile;Granulation tissue 09/26/22 1151   Pressure Injury Stage 2 09/26/22 0915   Alison-wound Assessment Intact; [place] 09/26/22 1151   Wound Length (cm) 2 cm 09/26/22 1151   Wound Width (cm) 2 1 cm 09/26/22 1151   Wound Depth (cm) 0 1 cm 09/26/22 1151   Wound Surface Area (cm^2) 4 2 cm^2 09/26/22 1151   Wound Volume (cm^3) 0 42 cm^3 09/26/22 1151   Calculated Wound Volume (cm^3) 0 42 cm^3 09/26/22 1151   Change in Wound Size % 78 13 09/26/22 1151   Drainage Amount Scant 09/26/22 1151   Drainage Description Serosanguineous 09/26/22 1151   Treatments Cleansed 09/26/22 1151   Dressing Other (Comment);Dry dressing 09/26/22 1151   Wound packed? No 09/26/22 1151   Dressing Changed Changed 09/26/22 1151   Patient Tolerance Tolerated well 09/26/22 1151   Dressing Status Intact 09/26/22 0915       Wound 09/26/22 MASD Abdomen Anterior;Right; Inner (Active)   Wound Image   09/26/22 1153   Wound Description Beefy red 09/26/22 1153   Alison-wound Assessment Pink; Intact 09/26/22 1153   Wound Length (cm) 0 5 cm 09/26/22 1153   Wound Width (cm) 1 cm 09/26/22 1153   Wound Depth (cm) 0 1 cm 09/26/22 1153   Wound Surface Area (cm^2) 0 5 cm^2 09/26/22 1153   Wound Volume (cm^3) 0 05 cm^3 09/26/22 1153   Calculated Wound Volume (cm^3) 0 05 cm^3 09/26/22 1153   Drainage Amount Scant 09/26/22 1153   Drainage Description Serosanguineous 09/26/22 1153   Non-staged Wound Description Partial thickness 09/26/22 1153   Treatments Cleansed 09/26/22 1153   Dressing Dry dressing; Other (Comment) 09/26/22 1153   Patient Tolerance Tolerated well 09/26/22 1153       Wound 09/26/22 Pelvis Anterior; Left (Active)   Wound Image   09/26/22 1155   Wound Description Dry;Pink 09/26/22 1155   Alison-wound Assessment Intact; [place] 09/26/22 1155   Wound Length (cm) 2 5 cm 09/26/22 1155   Wound Width (cm) 2 cm 09/26/22 1155   Wound Depth (cm) 0 1 cm 09/26/22 1155   Wound Surface Area (cm^2) 5 cm^2 09/26/22 1155   Wound Volume (cm^3) 0 5 cm^3 09/26/22 1155   Calculated Wound Volume (cm^3) 0 5 cm^3 09/26/22 1155   Drainage Amount None 09/26/22 1155   Non-staged Wound Description Partial thickness 09/26/22 1155   Dressing Dry dressing 09/26/22 1155   Dressing Changed New 09/26/22 1155   Patient Tolerance Tolerated well 09/26/22 1155     Reviewed plan of care with primary RN [facility]  Recommendations written as orders  Wound care team to follow weekly while admitted  Questions or concerns Paris ELIZONDON, RN, Annette Hardy

## 2022-09-26 NOTE — ASSESSMENT & PLAN NOTE
· Left pannus/hip/thigh cellulitis - recurrent  · Continue Levaquin  · Follow-up on blood cultures  · Will consider an ID evaluation  · Consult general surgery  · Patient reports having an outpatient evaluation set up plastic surgery October 5, 2022 but is concerned she may miss it since here caregiver is unavailable to take care of her   · Patient's caretakers are currently on medical leave will need placement into SNF once medically stable

## 2022-09-26 NOTE — PLAN OF CARE
Problem: Prexisting or High Potential for Compromised Skin Integrity  Goal: Skin integrity is maintained or improved  Description: INTERVENTIONS:  - Identify patients at risk for skin breakdown  - Assess and monitor skin integrity  - Assess and monitor nutrition and hydration status  - Monitor labs   - Assess for incontinence   - Turn and reposition patient  - Assist with mobility/ambulation  - Relieve pressure over bony prominences  - Avoid friction and shearing  - Provide appropriate hygiene as needed including keeping skin clean and dry  - Evaluate need for skin moisturizer/barrier cream  - Collaborate with interdisciplinary team   - Patient/family teaching  - Consider wound care consult   Outcome: Progressing     Problem: PAIN - ADULT  Goal: Verbalizes/displays adequate comfort level or baseline comfort level  Description: Interventions:  - Encourage patient to monitor pain and request assistance  - Assess pain using appropriate pain scale  - Administer analgesics based on type and severity of pain and evaluate response  - Implement non-pharmacological measures as appropriate and evaluate response  - Consider cultural and social influences on pain and pain management  - Notify physician/advanced practitioner if interventions unsuccessful or patient reports new pain  Outcome: Progressing     Problem: Knowledge Deficit  Goal: Patient/family/caregiver demonstrates understanding of disease process, treatment plan, medications, and discharge instructions  Description: Complete learning assessment and assess knowledge base    Interventions:  - Provide teaching at level of understanding  - Provide teaching via preferred learning methods  Outcome: Progressing     Problem: GASTROINTESTINAL - ADULT  Goal: Maintains or returns to baseline bowel function  Description: INTERVENTIONS:  - Assess bowel function  - Encourage oral fluids to ensure adequate hydration  - Administer IV fluids if ordered to ensure adequate hydration  - Administer ordered medications as needed  - Encourage mobilization and activity  - Consider nutritional services referral to assist patient with adequate nutrition and appropriate food choices  Outcome: Progressing

## 2022-09-26 NOTE — ASSESSMENT & PLAN NOTE
Muriel Hernandez  [id number]  1957  63 y.o.  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 Tries to adhere to cardiac diet.      Moderate chronic exertional shortness of breath on exertion relieved with rest  No significant cough or wheezing    Intermittent palpitations, once every several days to several weeks lasting for less than 1 minute  No associated symptoms of dizziness, weakness, chest pain,  shortness of breath      No associated chest pain  No significant pedal edema    At times when she is exposed to cold air gets chest pain  Feels heavy and also chest tightness   Chest pain at rest and also with exertion   Once a week   Last for a few mins     No nausea   Associated diaphoresis   No radiation     No particular precipitating or relieving factors   Started 6 months      No fever or chills  No significant expectoration    No hemoptysis  Recurrent  syncope, woke up on floor and recurrent presyncope     No preceeding palpitations, no incontinence of urine or stools, no preceeding chest pain. No aura.  No seizure like activity  Started > 6 months ago     Occurs several times a week   Gets dizzy and gets nauseous and passes out   Gets warm all over   Gets sweat      Chronic low back pain    Joint pain in small, medium and large joints         History of present illness:  Muriel Hernandez is a 63 y.o. yo female with history of smoker   · Continue oral vancomycin for C diff prophylaxis 125 mg p o  q 12 hours who presents today for   Chief Complaint   Patient presents with   • loop recorder     site check    .    History  Past Medical History:   Diagnosis Date   • Anxiety    • Arthritis    • Asthma    • Cancer (CMS/HCC)     skin   • Chronic back pain    • COPD (chronic obstructive pulmonary disease) (CMS/HCC)    • GERD (gastroesophageal reflux disease)    • Glaucoma    • Hypertension    • IBS (irritable bowel syndrome)    • Neoplasm of uncertain behavior     nasal tip   • On home oxygen therapy    • Skin cancer    • TIA (transient ischemic attack)    • UTI (urinary tract infection)    ,   Past Surgical History:   Procedure Laterality Date   • BREAST SURGERY Left 1982    NO LYMPH NODES REMOVED   • CERVICAL FUSION     • COLONOSCOPY     • FLAP HEAD/NECK N/A 9/15/2020    Procedure: flap inset;  Surgeon: Jovan Trejo MD;  Location:  [facility];  Service: ENT;  Laterality: N/A;   • HEAD/NECK LESION/CYST EXCISION Bilateral 8/4/2020    Procedure: 1) radical excision of squamous cell carcinoma the nasal tip, 3.2 cm x 3.2 cm   2) paramedian forehead flap reconstruction of nasal tip with preservation of vascular pedicle   3) complex closure skin of forehead and scalp, 10.0 cm;  Surgeon: Jovan Trejo MD;  Location:  [facility];  Service: ENT;  Laterality: Bilateral;   • HEAD/NECK LESION/CYST EXCISION N/A 9/15/2020    Procedure: Pedicle division and flap inset;  Surgeon: Jovan Trejo MD;  Location:  [facility];  Service: ENT;  Laterality: N/A;   • HYSTERECTOMY     • KNEE SURGERY     • NOSE SURGERY     ,   Family History   Problem Relation Age of Onset   • Leukemia Mother    • Breast cancer Mother    • Colon cancer Mother    • Skin cancer Father    • Prostate cancer Father    • Cancer Brother    • Cancer Maternal Grandfather    • Cancer Paternal Grandmother    ,   Social History     Tobacco Use   • Smoking status: Current Every Day Smoker     Packs/day: 1.00     Years: 50.00     Pack years: 50.00     Types: Cigarettes   •  Smokeless tobacco: Never Used   Substance Use Topics   • Alcohol use: Yes     Comment: OCASSIONALY   • Drug use: Never   ,     Medications  Current Outpatient Medications   Medication Sig Dispense Refill   • albuterol sulfate  (90 Base) MCG/ACT inhaler Inhale 2 puffs Every 6 (Six) Hours As Needed for Wheezing.     • ALPRAZolam (XANAX) 0.5 MG tablet Take 0.5 mg by mouth 3 (Three) Times a Day As Needed for Anxiety.     • bumetanide (BUMEX) 0.5 MG tablet Take 1 tablet by mouth Daily As Needed (EDEMA). 30 tablet 3   • dilTIAZem CD (CARDIZEM CD) 120 MG 24 hr capsule Take 1 capsule by mouth Daily. 30 capsule 11   • estradiol (ESTRACE) 1 MG tablet Take 1 mg by mouth Daily.     • HYDROcodone-acetaminophen (NORCO)  MG per tablet Take 1 tablet by mouth Every 4 (Four) Hours As Needed for Moderate Pain  (Pain). 18 tablet 0   • ipratropium-albuterol (DUO-NEB) 0.5-2.5 mg/3 ml nebulizer Take 3 mL by nebulization 4 (Four) Times a Day. 360 mL 2   • losartan-hydrochlorothiazide (HYZAAR) 100-25 MG per tablet Take 1 tablet by mouth 2 (Two) Times a Day.     • Methocarbamol (ROBAXIN-750 PO) Take 1 tablet by mouth As Needed.     • metoprolol succinate XL (TOPROL-XL) 50 MG 24 hr tablet Take 100 mg by mouth 2 (Two) Times a Day.     • montelukast (SINGULAIR) 10 MG tablet Take 10 mg by mouth Daily.     • O2 (OXYGEN) Inhale 2 L/min 1 (One) Time.     • omeprazole (priLOSEC) 40 MG capsule      • Potassium 99 MG tablet Take 1 tablet by mouth Daily.       No current facility-administered medications for this visit.        Allergies:  Sulfa antibiotics and Local [lidocaine]    Review of Systems  Review of Systems   Constitution: Positive for malaise/fatigue.   HENT: Negative.    Eyes: Negative.    Cardiovascular: Positive for dyspnea on exertion, palpitations and syncope. Negative for chest pain, claudication, cyanosis, irregular heartbeat, leg swelling, near-syncope, orthopnea and paroxysmal nocturnal dyspnea.   Respiratory: Negative.  "   Endocrine: Negative.    Hematologic/Lymphatic: Negative.    Skin: Negative.    Musculoskeletal: Positive for arthritis and joint pain.   Gastrointestinal: Negative for anorexia.   Genitourinary: Negative.    Psychiatric/Behavioral: Negative.      Same review of system and physical exam as last visit      Objective     Physical Exam:  /42   Pulse 60   Ht 152.4 cm (60\")   Wt 62.6 kg (138 lb)   SpO2 98%   BMI 26.95 kg/m²     Physical Exam   Constitutional: She appears well-developed.   HENT:   Head: Normocephalic.   Eyes: Lids are normal.   Neck: Normal carotid pulses and no JVD present. No tracheal tenderness present. Carotid bruit is not present. No tracheal deviation and no edema present.   Cardiovascular: Regular rhythm, S1 normal, S2 normal, normal heart sounds and normal pulses.      No systolic murmur is present.  Pulmonary/Chest: Effort normal. No stridor.   Abdominal: Soft. Normal appearance. She exhibits no distension. There is no abdominal tenderness.   Neurological: She is alert. No cranial nerve deficit or sensory deficit.   Skin: Skin is warm.   Psychiatric: Her speech is normal and behavior is normal. Thought content normal.   facial surgery     LINQ implant site is completely healed   No evidence of excessive bruising, pain, redness, swelling, discharge, foul odor or associated fever or chills.   Results Review:      Muriel Hernandez   Holter Monitor 72Hr-21Day (0296T/0298T)   Order# [id number]   Reading physician: Julio Cesar Nguyen MD Ordering physician: Julio Cesar Nguyen MD Study date: 20   Patient Information     Patient Name  Muriel Hernandez MRN  [id number] Sex  Female  (Age)  1957 (63 y.o.)   Interpretation Summary        · Average HR: 70. Min HR: 56. Max HR: 141.     · Entire report was reviewed.  Monitoring in days: ~14   · The predominant rhythm noted during the testing period was sinus rhythm.     · Rare supraventricular ectopics with an APC burden of: < 1%  · 18 " runs of supraventricular tachycardia longest 19 beats at a maximum rate of 141 bpm and an average rate of 111 bpm         · Rare premature ventricular contractions with a PVC burden of: < 1%  · No significant  ventricular tachy or deya arrhythmia.     · No correlated arrhythmia  · No significant pauses           Conclusion: Baseline rhythm is sinus.  No significant ectopy   18 runs of supraventricular tachycardia longest 19 beats at a maximum rate of 141 bpm and an average rate of 111 bpm          History: Carotid occlusive disease     IMPRESSION:  Impression:  1. There is less than 50% stenosis of the right internal carotid artery.  2. There is less than 50% stenosis of the left internal carotid artery.  3. Antegrade flow is demonstrated in bilateral vertebral arteries.           Results for orders placed during the hospital encounter of 20   Adult Transthoracic Echo Complete W/ Cont if Necessary Per Protocol    Narrative · Estimated EF = 55%.  · Moderate tricuspid valve regurgitation is present.  · Right ventricular cavity is mildly dilated.  · Mildly reduced right ventricular systolic function noted.  · Abnormal global Longitudinal LV strain = -7.8%.  · Moderate to severe pulmonary hypertension is present.             Muriel Hernandez   Regadenoson Stress Test With Myocardial Perfusion SPECT (Multi Study)   Order# [id number]   Reading physician: Julio Cesar Nguyen MD Ordering physician: Julio Cesar Nguyen MD Study date: 20   Patient Information     Patient Name  Muriel Hernandez MRN  [id number] Sex  Female  (Age)  1957 (63 y.o.)   Interpretation Summary        · Raw images reviewed with the following abnormalities noted: scanned with arms to the side.  · Left ventricular ejection fraction is normal (Calculated EF = 65%).  · Myocardial perfusion imaging indicates a normal myocardial perfusion study with no evidence of ischemia.  · Impressions are consistent with a low risk study.            IMPRESSION:  1. No acute or suspicious intracranial finding.  2. Mild chronic microvascular changes.  3. Trace fluid in the paranasal sinuses.     This report was finalized on 06/10/2020 16:23 by Dr Dang Singh MD.     Procedures    Assessment/Plan   Muriel was seen today for loop recorder.    Diagnoses and all orders for this visit:    Frequent falls    Syncope, unspecified syncope type    Nonspecific dizziness    Current every day smoker        ____________________________________________________________________________________________________________________________________________  Health maintenance and recommendations  Similar recommendations as last visit     Offered to give patient  a copy      Questions were encouraged, asked and answered to the patient's  understanding and satisfaction. Questions if any regarding current medications and side effects, need for refills and importance of compliance to medications stressed.    Reviewed available prior notes, consults, prior visits, laboratory findings, radiology and cardiology relevant reports. Updated chart as applicable. I have reviewed the patient's medical history in detail and updated the computerized patient record as relevant.      Updated patient regarding any new or relevant abnormalities on review of records or any new findings on physical exam. Mentioned to patient about purpose of visit and desirable health short and long term goals and objectives.    Primary to monitor CBC CMP Lipid panel and TSH as applicable    ___________________________________________________________________________________________________________________________________________         Plan      Monitor cardiac rhythm device LINQ  function regularly per established schedule or PRN      Check BP and heart rates twice daily at least 3x / week at home and bring a recording for me to review next visit  If BP >140/90 or < 100/60 persistently over 3 reading 30 mins  apart call sooner     Follow up with Agnes CAMPBELL to address interim issues     Patient complained about COU and asked Jeanne Bowens  to check      Return in about 3 months (around 12/22/2020).

## 2022-09-26 NOTE — ASSESSMENT & PLAN NOTE
Lab Results   Component Value Date    EGFR 38 09/26/2022    EGFR 36 09/25/2022    EGFR 36 09/24/2022    CREATININE 1 39 (H) 09/26/2022    CREATININE 1 44 (H) 09/25/2022    CREATININE 1 43 (H) 09/24/2022       · Developed mild LARRY   · Lasix on hold   · Monitor creatinine  · Check renal ultrasound

## 2022-09-26 NOTE — PROGRESS NOTES
[facility]  Progress Note - Mason Tineo 1950, 71 y o  female MRN: [id number]  Unit/Bed#: -[facility] Encounter: [id number]  Primary Care Provider: Precious Arnold MD   Date and time admitted to hospital: 9/22/2022  5:31 PM    * Panniculitis  Assessment & Plan  · Left pannus/hip/thigh cellulitis - recurrent  · Continue Levaquin  · Follow-up on blood cultures  · Will consider an ID evaluation  · Consult general surgery  · Patient reports having an outpatient evaluation set up plastic surgery October 5, 2022 but is concerned she may miss it since here caregiver is unavailable to take care of her   · Patient's caretakers are currently on medical leave will need placement into SNF once medically stable    History of Clostridioides difficile infection  Assessment & Plan  · Continue oral vancomycin for C diff prophylaxis 125 mg p o  q 12 hours    Acute kidney injury superimposed on CKD [facility])  Assessment & Plan  Lab Results   Component Value Date    EGFR 38 09/26/2022    EGFR 36 09/25/2022    EGFR 36 09/24/2022    CREATININE 1 39 (H) 09/26/2022    CREATININE 1 44 (H) 09/25/2022    CREATININE 1 43 (H) 09/24/2022       · Developed mild LARRY   · Lasix on hold   · Monitor creatinine  · Check renal ultrasound    BMI 70 and over, adult ([address] )  Assessment & Plan  · BMI 80 45  · Healthy diet recommended  · Consider referral to Bariatric Services post discharge    Chronic atrial fibrillation ([address] )  Assessment & Plan  · Continue Coumadin for anticoagulation purposes  · INR 2 3  · Rate is controlled    Depression with anxiety  Assessment & Plan  · Continue Cymbalta    Ambulatory dysfunction  Assessment & Plan  · PT OT case management for discharge planning  · Patient reports that her caregiver is unavailable to take care of her  · Case management is working on placement    Chronic diastolic congestive heart failure [facility])  Assessment & Plan  Wt Readings from Last 3 Encounters:   09/22/22 (!) 206 kg (454 lb 2 4 oz)   22 (!) 201 kg (442 lb 7 4 oz)   22 (!) 211 kg (464 lb 8 2 oz)     · Clinically without exacerbation  · Hold Lasix given LARRY  · Continue Coumadin for anticoagulation purposes  · Monitor daily weights, input and output      Other specified hypothyroidism  Assessment & Plan  · Continue levothyroxine    VTE Pharmacologic Prophylaxis:   Pharmacologic: Warfarin (Coumadin)  Mechanical VTE Prophylaxis in Place: ordered  Patient Centered Rounds: I have performed bedside rounds with nursing staff today  Discussions with Specialists or Other Care Team Provider: CM    Education and Discussions with Family / Patient: patient    Time Spent for Care: 30 minutes  More than 50% of total time spent on counseling and coordination of care as described above  Current Length of Stay: 3 day(s)    Current Patient Status: Inpatient   Certification Statement: The patient will continue to require additional inpatient hospital stay due to placement  Discharge Plan: Placement, probably tomorrow  Code Status: Level 1 - Full Code      Subjective:   Patient seen and examined  No complaints offered  Objective:     Vitals:   Temp (24hrs), Av 7 °F (36 5 °C), Min:97 6 °F (36 4 °C), Max:97 9 °F (36 6 °C)    Temp:  [97 6 °F (36 4 °C)-97 9 °F (36 6 °C)] 97 7 °F (36 5 °C)  HR:  [71-75] 71  Resp:  [17-18] 18  BP: (106-125)/(61-67) 106/61  SpO2:  [93 %-99 %] 93 %  Body mass index is 80 45 kg/m²  Input and Output Summary (last 24 hours): Intake/Output Summary (Last 24 hours) at 2022 1441  Last data filed at 2022 1311  Gross per 24 hour   Intake 355 ml   Output --   Net 355 ml       Physical Exam:     Physical Exam  Vitals and nursing note reviewed  Constitutional:       General: She is not in acute distress  Appearance: She is obese  HENT:      Head: Normocephalic and atraumatic  Mouth/Throat:      Mouth: Mucous membranes are moist       Pharynx: Oropharynx is clear  Eyes:      Extraocular Movements: Extraocular movements intact  Pupils: Pupils are equal, round, and reactive to light  Cardiovascular:      Rate and Rhythm: Normal rate and regular rhythm  Pulses: Normal pulses  Pulmonary:      Effort: Pulmonary effort is normal  No respiratory distress  Breath sounds: Normal breath sounds  Abdominal:      General: Bowel sounds are normal       Palpations: Abdomen is soft  Tenderness: There is no abdominal tenderness  Musculoskeletal:      Cervical back: Neck supple  Right lower leg: Edema present  Left lower leg: Edema present  Skin:     General: Skin is warm and dry  Capillary Refill: Capillary refill takes less than 2 seconds  Comments: panniculitits   Neurological:      General: No focal deficit present  Mental Status: She is alert and oriented to person, place, and time  Additional Data:     Labs:    Results from last 7 days   Lab Units 09/26/22  0615   WBC Thousand/uL 9 81   HEMOGLOBIN g/dL 10 3*   HEMATOCRIT % 34 6*   PLATELETS Thousands/uL 385   NEUTROS PCT % 79*   LYMPHS PCT % 13*   MONOS PCT % 5   EOS PCT % 3     Results from last 7 days   Lab Units 09/26/22  0615   SODIUM mmol/L 136   POTASSIUM mmol/L 4 2   CHLORIDE mmol/L 100   CO2 mmol/L 31   BUN mg/dL 28*   CREATININE mg/dL 1 39*   ANION GAP mmol/L 5   CALCIUM mg/dL 8 8   ALBUMIN g/dL 2 9*   TOTAL BILIRUBIN mg/dL 0 59   ALK PHOS U/L 91   ALT U/L 6*   AST U/L 12*   GLUCOSE RANDOM mg/dL 102     Results from last 7 days   Lab Units 09/26/22  0615   INR  2 08*             Results from last 7 days   Lab Units 09/22/22  1809   LACTIC ACID mmol/L 1 4   PROCALCITONIN ng/ml 0 07           * I Have Reviewed All Lab Data Listed Above  * Additional Pertinent [facility] Tests [facility]ed: [facility]    Recent Cultures (last 7 days):     Results from last 7 days   Lab Units 09/22/22  1809   BLOOD CULTURE  No Growth at 72 hrs    No Growth at 72 hrs  Last 24 Hours Medication List:   Current Facility-Administered Medications   Medication Dose Route Frequency Provider Last Rate    acetaminophen  650 mg Oral Q8H PRN Delmas Gigi, MYA      allopurinol  100 mg Oral Daily Delmas Dar, MYA      DULoxetine  20 mg Oral Daily Delmas Dar, MYA      levofloxacin  750 mg Oral Q24H Monique Townsend DO      levothyroxine  125 mcg Oral Early Morning Delmas DarMYA      ondansetron  4 mg Intravenous Q6H PRN Didi Ellington PA-C      oxyCODONE  5 mg Oral Q6H PRN Delmas Dar, MYA      polyethylene glycol  17 g Oral Daily Rabia Valverde DO      saccharomyces boulardii  250 mg Oral BID Delmas DarMYA      senna-docusate sodium  1 tablet Oral HS Rabia Valverde DO      triamcinolone   Topical Daily ORACIO Wilson      vancomycin  125 mg Oral Q12H Delmas DarMYA      warfarin  2 mg Oral Once per day on Mon Wed Fri Delmas Dar, MYA      warfarin  4 mg Oral Once per day on Sun Tue Thu Sat [place] Dar, MYA          Today, Patient Was Seen By: ORACIO Wilson    ** Please Note: Dictation voice to text software may have been used in the creation of this document   **

## 2022-09-26 NOTE — CASE MANAGEMENT
Case Management Discharge Planning Note    Patient name Scarlett Berry  Location [address] [id number] MRN [id number]  : 1950 Date 2022       Current Admission Date: 2022  Current Admission Diagnosis:Panniculitis   Patient Active Problem List    Diagnosis Date Noted    BMI 70 and over, adult [facility]) 2022    Chronic atrial fibrillation ([facility] ) 2022    History of Clostridioides difficile infection 2022    Lymphedema of extremity 2021    Other specified hypothyroidism 10/03/2020    Mild episode of recurrent major depressive disorder ([facility] ) 10/03/2020    Idiopathic chronic 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 10/03/2020    Primary osteoarthritis involving multiple joints 10/03/2020    Chronic diastolic congestive heart failure ([facility] ) 10/03/2020    Panniculitis 10/03/2020    Gastroesophageal reflux disease without esophagitis 10/03/2020    Hx MRSA infection 2020    Functional gait abnormality 2019    Impaired mobility 2019    Osteoarthritis of glenohumeral joint 2019    Left ovarian cyst 2017    Depression with anxiety 07/15/2017    Anemia 2016    Ambulatory dysfunction 2016    [facility]    Adjustment disorder 2013    Irritable bowel syndrome 2012    Left atrial enlargement 2012    Left ventricular hypertrophy 2012    Carpal tunnel syndrome 2012    Gout 2012    Hyperlipidemia 2012    Symptomatic menopausal or female climacteric states 2012      LOS (days): 3  Geometric Mean LOS (GMLOS) (days): 3 20  Days to GMLOS:0 3     OBJECTIVE:  Risk of Unplanned Readmission Score: 44 35         Current admission status: Inpatient   Preferred Pharmacy:    [address] [address]  Phone: [phone] Fax: [phone] [address] [place], [place] - [address]  Phone: [phone] Fax: [phone]    Primary Care Provider: Laura Reed MD    Primary Insurance: MEDICARE  Secondary Insurance: AARP    DISCHARGE DETAILS:    Discharge planning discussed with[de-identified] Patient        IMM Given (Date):: 09/26/22 (Copy provided to patient and media)  IMM Given to[de-identified] Patient     Additional Comments: Met with patient at bedside to discuss SNF bed search  No beds found  Call to [address] [place] [address] at [address] and she will check all sites with in a 100 miles for bed availability  Call to Jam Matta in admissions at [facility] and they have no bed  Call to [facility] and spoke with Brandie referral faxed to [phone]  Call to  at [address] to [facility] [phone] x [phone] left VM requesting CB  CM department following

## 2022-09-26 NOTE — NURSING NOTE
Patient awake and alert, no complaints of pain or nausea  Small amount of weeping from bilateral hip areas  Dressing intact under R abdominal fold   Call bell in reach

## 2022-09-27 LAB
ANION GAP SERPL CALCULATED.3IONS-SCNC: 5 MMOL/L (ref 4–13)
BACTERIA BLD CULT: NORMAL
BACTERIA BLD CULT: NORMAL
BASOPHILS # BLD AUTO: 0.04 THOUSANDS/ΜL (ref 0–0.1)
BASOPHILS NFR BLD AUTO: 0 % (ref 0–1)
BUN SERPL-MCNC: 25 MG/DL (ref 5–25)
CALCIUM SERPL-MCNC: 9 MG/DL (ref 8.4–10.2)
CHLORIDE SERPL-SCNC: 101 MMOL/L (ref 96–108)
CO2 SERPL-SCNC: 31 MMOL/L (ref 21–32)
CREAT SERPL-MCNC: 1.26 MG/DL (ref 0.6–1.3)
EOSINOPHIL # BLD AUTO: 0.37 THOUSAND/ΜL (ref 0–0.61)
EOSINOPHIL NFR BLD AUTO: 4 % (ref 0–6)
ERYTHROCYTE [DISTWIDTH] IN BLOOD BY AUTOMATED COUNT: 16.8 % (ref 11.6–15.1)
GFR SERPL CREATININE-BSD FRML MDRD: 42 ML/MIN/1.73SQ M
GLUCOSE SERPL-MCNC: 107 MG/DL (ref 65–140)
HCT VFR BLD AUTO: 34.2 % (ref 34.8–46.1)
HGB BLD-MCNC: 10.3 G/DL (ref 11.5–15.4)
IMM GRANULOCYTES # BLD AUTO: 0.03 THOUSAND/UL (ref 0–0.2)
IMM GRANULOCYTES NFR BLD AUTO: 0 % (ref 0–2)
INR PPP: 2.19 (ref 0.84–1.19)
LYMPHOCYTES # BLD AUTO: 1.33 THOUSANDS/ΜL (ref 0.6–4.47)
LYMPHOCYTES NFR BLD AUTO: 14 % (ref 14–44)
MCH RBC QN AUTO: 25.2 PG (ref 26.8–34.3)
MCHC RBC AUTO-ENTMCNC: 30.1 G/DL (ref 31.4–37.4)
MCV RBC AUTO: 84 FL (ref 82–98)
MONOCYTES # BLD AUTO: 0.65 THOUSAND/ΜL (ref 0.17–1.22)
MONOCYTES NFR BLD AUTO: 7 % (ref 4–12)
NEUTROPHILS # BLD AUTO: 7.3 THOUSANDS/ΜL (ref 1.85–7.62)
NEUTS SEG NFR BLD AUTO: 75 % (ref 43–75)
NRBC BLD AUTO-RTO: 0 /100 WBCS
PLATELET # BLD AUTO: 370 THOUSANDS/UL (ref 149–390)
PMV BLD AUTO: 8.5 FL (ref 8.9–12.7)
POTASSIUM SERPL-SCNC: 4.5 MMOL/L (ref 3.5–5.3)
PROTHROMBIN TIME: 24.3 SECONDS (ref 11.6–14.5)
RBC # BLD AUTO: 4.09 MILLION/UL (ref 3.81–5.12)
SODIUM SERPL-SCNC: 137 MMOL/L (ref 135–147)
WBC # BLD AUTO: 9.72 THOUSAND/UL (ref 4.31–10.16)

## 2022-09-27 PROCEDURE — 99233 SBSQ HOSP IP/OBS HIGH 50: CPT | Performed by: NURSE PRACTITIONER

## 2022-09-27 PROCEDURE — 85610 PROTHROMBIN TIME: CPT | Performed by: NURSE PRACTITIONER

## 2022-09-27 PROCEDURE — 85025 COMPLETE CBC W/AUTO DIFF WBC: CPT | Performed by: NURSE PRACTITIONER

## 2022-09-27 PROCEDURE — 80048 BASIC METABOLIC PNL TOTAL CA: CPT | Performed by: NURSE PRACTITIONER

## 2022-09-27 RX ORDER — CEFUROXIME AXETIL 500 MG/1
500 TABLET ORAL EVERY 12 HOURS
Qty: 3 TABLET | Refills: 0
Start: 2022-09-27 | End: 2022-09-29

## 2022-09-27 RX ORDER — POLYETHYLENE GLYCOL 3350 17 G/17G
17 POWDER, FOR SOLUTION ORAL DAILY
Refills: 0
Start: 2022-09-27

## 2022-09-27 RX ORDER — VANCOMYCIN HYDROCHLORIDE 125 MG/1
125 CAPSULE ORAL EVERY 12 HOURS
Refills: 0
Start: 2022-09-27 | End: 2022-10-02

## 2022-09-27 RX ORDER — AMOXICILLIN 250 MG
1 CAPSULE ORAL
Refills: 0
Start: 2022-09-27

## 2022-09-27 RX ADMIN — Medication 250 MG: at 17:55

## 2022-09-27 RX ADMIN — LEVOTHYROXINE SODIUM 125 MCG: 25 TABLET ORAL at 05:26

## 2022-09-27 RX ADMIN — CEFUROXIME AXETIL 500 MG: 250 TABLET, FILM COATED ORAL at 19:24

## 2022-09-27 RX ADMIN — CEFUROXIME AXETIL 500 MG: 250 TABLET, FILM COATED ORAL at 07:58

## 2022-09-27 RX ADMIN — VANCOMYCIN HYDROCHLORIDE 125 MG: 125 CAPSULE ORAL at 12:37

## 2022-09-27 RX ADMIN — WARFARIN SODIUM 4 MG: 2 TABLET ORAL at 17:55

## 2022-09-27 RX ADMIN — ALLOPURINOL 100 MG: 100 TABLET ORAL at 09:57

## 2022-09-27 RX ADMIN — Medication 250 MG: at 09:57

## 2022-09-27 RX ADMIN — DULOXETINE 20 MG: 20 CAPSULE, DELAYED RELEASE ORAL at 09:57

## 2022-09-27 RX ADMIN — TRIAMCINOLONE ACETONIDE: 1 CREAM TOPICAL at 09:58

## 2022-09-27 NOTE — PLAN OF CARE
Problem: Prexisting or High Potential for Compromised Skin Integrity  Goal: Skin integrity is maintained or improved  Description: INTERVENTIONS:  - Identify patients at risk for skin breakdown  - Assess and monitor skin integrity  - Assess and monitor nutrition and hydration status  - Monitor labs   - Assess for incontinence   - Turn and reposition patient  - Assist with mobility/ambulation  - Relieve pressure over bony prominences  - Avoid friction and shearing  - Provide appropriate hygiene as needed including keeping skin clean and dry  - Evaluate need for skin moisturizer/barrier cream  - Collaborate with interdisciplinary team   - Patient/family teaching  - Consider wound care consult   Outcome: Progressing     Problem: PAIN - ADULT  Goal: Verbalizes/displays adequate comfort level or baseline comfort level  Description: Interventions:  - Encourage patient to monitor pain and request assistance  - Assess pain using appropriate pain scale  - Administer analgesics based on type and severity of pain and evaluate response  - Implement non-pharmacological measures as appropriate and evaluate response  - Consider cultural and social influences on pain and pain management  - Notify physician/advanced practitioner if interventions unsuccessful or patient reports new pain  Outcome: Progressing     Problem: GASTROINTESTINAL - ADULT  Goal: Minimal or absence of nausea and/or vomiting  Description: INTERVENTIONS:  - Administer IV fluids if ordered to ensure adequate hydration  - Maintain NPO status until nausea and vomiting are resolved  - Nasogastric tube if ordered  - Administer ordered antiemetic medications as needed  - Provide nonpharmacologic comfort measures as appropriate  - Advance diet as tolerated, if ordered  - Consider nutrition services referral to assist patient with adequate nutrition and appropriate food choices  Outcome: Progressing  Goal: Maintains or returns to baseline bowel function  Description: INTERVENTIONS:  - Assess bowel function  - Encourage oral fluids to ensure adequate hydration  - Administer IV fluids if ordered to ensure adequate hydration  - Administer ordered medications as needed  - Encourage mobilization and activity  - Consider nutritional services referral to assist patient with adequate nutrition and appropriate food choices  Outcome: Progressing

## 2022-09-27 NOTE — PLAN OF CARE
Problem: INFECTION - ADULT  Goal: Absence or prevention of progression during hospitalization  Description: INTERVENTIONS:  - Assess and monitor for signs and symptoms of infection  - Monitor lab/diagnostic results  - Monitor all insertion sites, i e  indwelling lines, tubes, and drains  - Monitor endotracheal if appropriate and nasal secretions for changes in amount and color  - [place] appropriate cooling/warming therapies per order  - Administer medications as ordered  - Instruct and encourage patient and family to use good hand hygiene technique  - Identify and instruct in appropriate isolation precautions for identified infection/condition  Outcome: Progressing     Problem: SAFETY ADULT  Goal: Patient will remain free of falls  Description: INTERVENTIONS:  - Educate patient/family on patient safety including physical limitations  - Instruct patient to call for assistance with activity   - Consult OT/PT to assist with strengthening/mobility   - Keep Call bell within reach  - Keep bed low and locked with side rails adjusted as appropriate  - Keep care items and personal belongings within reach  - Initiate and maintain comfort rounds  - Make Fall Risk Sign visible to staff  - Offer Toileting in advance of need  - Initiate/Maintain bed alarm  - Obtain necessary fall risk management equipment:   - Apply yellow socks and bracelet for high fall risk patients  - Consider moving patient to room near nurses station  Outcome: Progressing  Goal: Maintain or return to baseline ADL function  Description: INTERVENTIONS:  -  Assess patient's ability to carry out ADLs; assess patient's baseline for ADL function and identify physical deficits which impact ability to perform ADLs (bathing, care of mouth/teeth, toileting, grooming, dressing, etc )  - Assess/evaluate cause of self-care deficits   - Assess range of motion  - Assess patient's mobility; develop plan if impaired  - Assess patient's need for assistive devices and provide as appropriate  - Encourage maximum independence but intervene and supervise when necessary  - Involve family in performance of ADLs  - Assess for home care needs following discharge   - Consider OT consult to assist with ADL evaluation and planning for discharge  - Provide patient education as appropriate  Outcome: Progressing  Goal: Maintains/Returns to pre admission functional level  Description: INTERVENTIONS:  - Perform BMAT or MOVE assessment daily    - Set and communicate daily mobility goal to care team and patient/family/caregiver  - Collaborate with rehabilitation services on mobility goals if consulted  - Record patient progress and toleration of activity level   Outcome: Progressing     Problem: DISCHARGE PLANNING  Goal: Discharge to home or other facility with appropriate resources  Description: INTERVENTIONS:  - Identify barriers to discharge w/patient and caregiver  - Arrange for needed discharge resources and transportation as appropriate  - Identify discharge learning needs (meds, wound care, etc )  - Refer to Case Management Department for coordinating discharge planning if the patient needs post-hospital services based on physician/advanced practitioner order or complex needs related to functional status, cognitive ability, or social support system  Outcome: Progressing     Problem: Knowledge Deficit  Goal: Patient/family/caregiver demonstrates understanding of disease process, treatment plan, medications, and discharge instructions  Description: Complete learning assessment and assess knowledge base    Interventions:  - Provide teaching at level of understanding  - Provide teaching via preferred learning methods  Outcome: Progressing     Problem: METABOLIC, FLUID AND ELECTROLYTES - ADULT  Goal: Electrolytes maintained within normal limits  Description: INTERVENTIONS:  - Monitor labs and assess patient for signs and symptoms of electrolyte imbalances  - Administer electrolyte replacement as ordered  - Monitor response to electrolyte replacements, including repeat lab results as appropriate  - Instruct patient on fluid and nutrition as appropriate  Outcome: Progressing  Goal: Fluid balance maintained  Description: INTERVENTIONS:  - Monitor labs   - Monitor I/O and WT  - Instruct patient on fluid and nutrition as appropriate  - Assess for signs & symptoms of volume excess or deficit  Outcome: Progressing  Goal: Glucose maintained within target range  Description: INTERVENTIONS:  - Monitor Blood Glucose as ordered  - Assess for signs and symptoms of hyperglycemia and hypoglycemia  - Administer ordered medications to maintain glucose within target range  - Assess nutritional intake and initiate nutrition service referral as needed  Outcome: Progressing     Problem: MOBILITY - ADULT  Goal: Maintain or return to baseline ADL function  Description: INTERVENTIONS:  -  Assess patient's ability to carry out ADLs; assess patient's baseline for ADL function and identify physical deficits which impact ability to perform ADLs (bathing, care of mouth/teeth, toileting, grooming, dressing, etc )  - Assess/evaluate cause of self-care deficits   - Assess range of motion  - Assess patient's mobility; develop plan if impaired  - Assess patient's need for assistive devices and provide as appropriate  - Encourage maximum independence but intervene and supervise when necessary  - Involve family in performance of ADLs  - Assess for home care needs following discharge   - Consider OT consult to assist with ADL evaluation and planning for discharge  - Provide patient education as appropriate  Outcome: Progressing  Goal: Maintains/Returns to pre admission functional level  Description: INTERVENTIONS:  - Perform BMAT or MOVE assessment daily    - Set and communicate daily mobility goal to care team and patient/family/caregiver     - Collaborate with rehabilitation services on mobility goals if consulted  - Record patient progress and toleration of activity level   Outcome: Progressing

## 2022-09-27 NOTE — QUICK NOTE
Spoke to patient this morning  Awaiting disposition to SNF  Continues with abx and wound care  Can follow-up with surgery PRN  Should follow-up with Plastic Surgery as able

## 2022-09-27 NOTE — ASSESSMENT & PLAN NOTE
Lab Results   Component Value Date    EGFR 42 09/27/2022    EGFR 38 09/26/2022    EGFR 36 09/25/2022    CREATININE 1 26 09/27/2022    CREATININE 1 39 (H) 09/26/2022    CREATININE 1 44 (H) 09/25/2022       · Developed mild LARRY-resolved   · Resume home lasix on d/c  · Monitor creatinine as an outpatient  · Renal ultrasound

## 2022-09-27 NOTE — CASE MANAGEMENT
Case Management Progress Note    Patient name Torsten Carrion  Location [address] 212/-01 MRN [id number]  : 1950 Date 2022       LOS (days): 4  Geometric Mean LOS (GMLOS) (days): 3 20  Days to GMLOS:-0 7        OBJECTIVE:        Current admission status: Inpatient  Preferred Pharmacy:    [address]  Phone: [phone] Fax: [phone] [address]  [address]  [address] [facility] [address]  Phone: [phone] Fax: [phone]    Primary Care Provider: Dee Valencia MD    Primary Insurance: MEDICARE  Secondary Insurance: [facility]    PROGRESS NOTE:    Call to [address] [place] [address] [phone] option 1 and left VM for admissions  Call to Reebcca at [place] ([phone])  Per Rebecca there is no Leyva Shadi there in admissions  The admissions person is Dennise Geiger  Per Rebecca she has not spoke to patient or niece  Rebecca is going to call Americo Brown in the finance department to check if a referral was received  New referral sent to [address] [place] [address] via [facility] department following

## 2022-09-27 NOTE — ASSESSMENT & PLAN NOTE
· Left pannus/hip/thigh cellulitis - recurrent  · Levaquin switched to cefuroxime per ID pharmacist  · Follow-up on blood cultures-NGTD  · Surgery input appreciated  · Patient reports having an outpatient evaluation set up plastic surgery October 5, 2022 but is concerned she may miss it since here caregiver is unavailable to take care of her   · Patient's caretakers are currently on medical leave will need placement into SNF once medically stable  · Local wound care

## 2022-09-27 NOTE — DISCHARGE SUMMARY
Toby 46  Discharge- Liyah Bello 1950, 71 y o  female MRN: [id number]  Unit/Bed#: -[facility] Encounter: [id number]  Primary Care Provider: Shayan Allne MD   Date and time admitted to hospital: 9/22/2022  5:31 PM    Ambulatory dysfunction  Assessment & Plan  · PT OT case management for discharge planning  · Patient reports that her caregiver is unavailable to take care of her  · Case management arranged placement at [facility] in [facility] on 9/28/22- however, patient attempted to appeal her discharge- this was denied  Patient  Was discharged to [facility]      Chronic atrial fibrillation (HCC)  Assessment & Plan  · Continue Coumadin for anticoagulation purposes  · INR 2 19  · Rate is controlled    * Panniculitis  Assessment & Plan  · Left pannus/hip/thigh cellulitis - recurrent  · Levaquin switched to cefuroxime per ID pharmacist  · Blood cultures-NGTD  · Surgery input appreciated  · Patient reports having an outpatient evaluation set up plastic surgery October 5, 2022 but is concerned she may miss it since here caregiver is unavailable to take care of her   · Patient's caretakers are currently on medical leave will needed placement into SNF   · The patient was accepted at [facility] SNF    History of Clostridioides difficile infection  Assessment & Plan  · Continue oral vancomycin for C diff prophylaxis 125 mg orally every 12 hours through 10/1/2022    Acute kidney injury superimposed on CKD [facility])  Assessment & Plan  Lab Results   Component Value Date    EGFR 46 09/29/2022    EGFR 41 09/28/2022    EGFR 42 09/27/2022    CREATININE 1 19 09/29/2022    CREATININE 1 29 09/28/2022    CREATININE 1 26 09/27/2022       · Developed mild LARRY- now resolved   · Resume home lasix on d/c  · Monitor creatinine as an outpatient    Chronic diastolic congestive heart failure ([address] )  Assessment & Plan  Wt Readings from Last 3 Encounters:   09/22/22 (!) 206 kg (454 lb 2 4 oz) 07/25/22 (!) 201 kg (442 lb 7 4 oz)   06/14/22 (!) 211 kg (464 lb 8 2 oz)     · Clinically without exacerbation  · Lasix was held due to LARRY, may resume on d/c  · Continue Coumadin for anticoagulation      BMI 70 and over, adult ([facility] )  Assessment & Plan  · BMI 80 45  · Healthy diet recommended  · Consider referral to Bariatric Services post discharge    Depression with anxiety  Assessment & Plan  · Controlled  · Continue Cymbalta    Other specified hypothyroidism  Assessment & Plan  · Continue levothyroxine    Discharging Physician / Practitioner: Mark Smith  PCP: Sivan Swain MD  Admission Date:   Admission Orders (From admission, onward)     Ordered        09/23/22 1416  [facility]  Once                      Discharge Date: 09/29/22    Medical Problems             Resolved Problems  Date Reviewed: 9/29/2022   None                 Consultations During Hospital Stay:  · General surgery, PT, CM    Procedures Performed:   · Renal ultrasound    Significant Findings / Test Results:   · Recurrent panniculitis    Incidental Findings:   · None     Test Results Pending at Discharge (will require follow up): · None     Outpatient Tests Requested:  · BMP in 1 week    Complications:  None apparent    Reason for Admission:  Wound, fever    Hospital Course:     Zoe Georges is a 71 y o  female patient who originally presented to the hospital on 9/22/2022 due to wound and fever  Please see H&P by Dr Nikhil Catalan dated 9/23/22 for complete details of presentation  The patient was noted to have left pannus/hip/thight cellulitis and started on IV levaquin  Levaquin switched to cefuroxime per ID pharmacist  The patient's cellulitis improved and the patient was medically cleared for discharge on 9/27/22 however the patient was unable to return home due to her caregiver being on medical leave  CM placed referrals for SNF placement   The patient was accepted at [facility] however she stated she did not want to go there and attempted to appear her discharge  Medicare denied her appeal and she was discharged to [facility] on 9/29/2022  Please see above list of diagnoses and related plan for additional information  Condition at Discharge: stable     Discharge Day Visit / Exam:     Subjective:  Patient seen and examined  No physical complaints offered  Vitals: Blood Pressure: 108/61 (09/29/22 1548)  Pulse: 70 (09/29/22 1548)  Temperature: 97 5 °F (36 4 °C) (09/29/22 1548)  Temp Source: Oral (09/29/22 1548)  Respirations: 19 (09/29/22 1548)  Height: 5' 3" (160 cm) (09/22/22 1740)  Weight - Scale: (!) 206 kg (454 lb 2 4 oz) (09/22/22 1740)  SpO2: 96 % (09/29/22 1548)    Exam:   Physical Exam  Constitutional:       General: She is not in acute distress  Appearance: She is obese  HENT:      Head: Normocephalic  Nose: Nose normal       Mouth/Throat:      Mouth: Mucous membranes are dry  Eyes:      Extraocular Movements: Extraocular movements intact  Pupils: Pupils are equal, round, and reactive to light  Cardiovascular:      Rate and Rhythm: Normal rate and regular rhythm  Pulses: Normal pulses  Pulmonary:      Effort: Pulmonary effort is normal    Abdominal:      General: Abdomen is flat  Bowel sounds are normal       Palpations: Abdomen is soft  Musculoskeletal:      Cervical back: Normal range of motion  Right lower leg: No edema  Left lower leg: No edema  Skin:     General: Skin is warm and dry  Capillary Refill: Capillary refill takes less than 2 seconds  Comments: panniculitis   Neurological:      General: No focal deficit present  Mental Status: She is alert and oriented to person, place, and time  Discussion with Family: Patient    Discharge instructions/Information to patient and family:   See after visit summary for information provided to patient and family        Provisions for Follow-Up Care:  See after visit summary for information related to follow-up care and any pertinent home health orders  Disposition:     Mati Kennedy at [facility] in [place]  Planned Readmission: No     Discharge Statement:  I spent 30 minutes discharging the patient  This time was spent on the day of discharge  I had direct contact with the patient on the day of discharge  Greater than 50% of the total time was spent examining patient, answering all patient questions, arranging and discussing plan of care with patient as well as directly providing post-discharge instructions  Additional time then spent on discharge activities  Discharge Medications:  See after visit summary for reconciled discharge medications provided to patient and family        ** Please Note: This note has been constructed using a voice recognition system **

## 2022-09-27 NOTE — ASSESSMENT & PLAN NOTE
· PT OT case management for discharge planning  · Patient reports that her caregiver is unavailable to take care of her  · Case management arranged placement at [facility] in [address] [facility] today at 1100-however, patient is appealing her discharge-d/w patient at bedside

## 2022-09-27 NOTE — CASE MANAGEMENT
Case Management Progress Note    Patient name Lynita Habermann  Location [address] 212/-01 MRN [id number]  : 1950 Date 2022       LOS (days): 4  Geometric Mean LOS (GMLOS) (days): 3 20  Days to GMLOS:-0 7        OBJECTIVE:        Current admission status: Inpatient  Preferred Pharmacy:   [address]  Phone: [phone] Fax: [phone] [address]  [address]  [address] [facility] [address]  Phone: [phone] Fax: [phone]    Primary Care Provider: Dillon Centeno MD    Primary Insurance: MEDICARE  Secondary Insurance: [employer]    PROGRESS NOTE:    Fax received from [facility] for Documentation Request Mitcehll ID [id number]  Task sent to [facility] discharge support staff via [employer]

## 2022-09-27 NOTE — ASSESSMENT & PLAN NOTE
Wt Readings from Last 3 Encounters:   09/22/22 (!) 206 kg (454 lb 2 4 oz)   07/25/22 (!) 201 kg (442 lb 7 4 oz)   06/14/22 (!) 211 kg (464 lb 8 2 oz)     · Clinically without exacerbation  · Lasix was held due to LARRY, may resume on d/c  · Continue Coumadin for anticoagulation  · Monitor volume status

## 2022-09-27 NOTE — CASE MANAGEMENT
Case Management Progress Note    Patient name Deisy Stafford  Location [address] 212/-01 MRN [id number]  : 1950 Date 2022       LOS (days): 4  Geometric Mean LOS (GMLOS) (days): 3 20  Days to GMLOS:-0 6        OBJECTIVE:        Current admission status: Inpatient  Preferred Pharmacy:    [place] [address]  Phone: [phone] Fax: [phone] [address] [facility], [address]  [address]  [address] [facility] [address]  Phone: [phone] Fax: [phone]    Primary Care Provider: Abimael Husain MD    Primary Insurance: MEDICARE  Secondary Insurance: AARP    PROGRESS NOTE:    Message received from [address] [place] [address] that patient has appealed discharge  Attempted to meet with patient at bedside and she was on phone starting appeal process  Met with patient and she has appealed discharge stating she is not medically stable to discharge  Discussed discharge planning and bed at [facility]  Explained all referrals that have been sent and lack of bed avaibility  Discussed possibility that [address] may not have the bed when appeal process is complete and that case management will need to broaden bed search out greater than 100 miles  Explained to patient I have a bed and she can go to facility and work with  to transfer to other facility when bed becomes available  Discussed if [facility] denies appeal that she will be financially responsible for hospital stay since CM has found a SNF bed  Patient states that herself and malini Redmond who is a  at [employer] have talked to Gurpreet in admissions at [facility] and they can offer her a bed  Called into patient's room to talk to malini Redmond on phone  Malini questioning what is going on  Discussed with malini need for SNF and where referrals have been sent and denials    Discussed [facility] and they can offer patient a bed today however she has appalled discharge  Per Mukund Hughes she has spoke with Rafael William in admissions at [facility] [address] and she can give patient a bed  Per Mukund Hughes she use to work at [address] and called this CM a liar that a referral has never been sent to facility  Discussed with malini that this CM will send another referral and will call Rafael William in admissions and ask her to reach out to Mukund Hughes and patient  Malini yelling at this CM and hung up on CM  Call to Rafael William and she was off yesterday and has not talked to patient or niece and she doesn't have a bed to offer patient  Rafael William is admissions [facility] not sure why they said Rafael Majorors must have been [place] who they spoke with  Rafael William will reach out to niece Mukund Hughes and patient to discuss same  Transport via [facility] cancelled for today  Call to Ashley at [facility] and discussed appeal   Discussed holding bed and Ashley states she will do her best  Update to Terrell Lundberg who will discuss with [facility] provider appeal in progress  CM department following

## 2022-09-27 NOTE — ASSESSMENT & PLAN NOTE
· PT OT case management for discharge planning  · Patient reports that her caregiver is unavailable to take care of her  · Case management arranged placement at [facility] in [address] [facility] today at 1100-however, patient is appealing her discharge-d/w patient at bedside   Patient agrees to be discharged to [facility] by noon tomorrow pending results of appeal

## 2022-09-27 NOTE — PROGRESS NOTES
Toby 45  Progress Note - Bruce Yarbrough 1950, 71 y o  female MRN: [id number]  Unit/Bed#: -01 Encounter: [id number]  Primary Care Provider: Kade Rivas MD   Date and time admitted to hospital: 9/22/2022  5:31 PM    Ambulatory dysfunction  Assessment & Plan  · PT OT case management for discharge planning  · Patient reports that her caregiver is unavailable to take care of her  · Case management arranged placement at [facility] in [facility] today at 1100-however, patient is appealing her discharge-d/w patient at bedside    * Panniculitis  Assessment & Plan  · Left pannus/hip/thigh cellulitis - recurrent  · Levaquin switched to cefuroxime per ID pharmacist  · Follow-up on blood cultures-NGTD  · Surgery input appreciated  · Patient reports having an outpatient evaluation set up plastic surgery October 5, 2022 but is concerned she may miss it since here caregiver is unavailable to take care of her   · Patient's caretakers are currently on medical leave will need placement into SNF once medically stable  · Local wound care    History of Clostridioides difficile infection  Assessment & Plan  · Continue oral vancomycin for C diff prophylaxis 125 mg orally every 12 hours through 10/1/2022    Acute kidney injury superimposed on CKD [facility])  Assessment & Plan  Lab Results   Component Value Date    EGFR 42 09/27/2022    EGFR 38 09/26/2022    EGFR 36 09/25/2022    CREATININE 1 26 09/27/2022    CREATININE 1 39 (H) 09/26/2022    CREATININE 1 44 (H) 09/25/2022       · Developed mild LARRY-resolved   · Resume home lasix on d/c  · Monitor creatinine as an outpatient  · Renal ultrasound    BMI 70 and over, adult ([facility] )  Assessment & Plan  · BMI 80 45  · Healthy diet recommended  · Consider referral to Bariatric Services post discharge    [facility]  Assessment & Plan  · Continue Coumadin for anticoagulation purposes  · INR 2 19  · Rate is controlled    Depression with anxiety  Assessment & Plan  · Controlled  · Continue Cymbalta  · Supportive care    Chronic diastolic congestive heart failure (HCC)  Assessment & Plan  Wt Readings from Last 3 Encounters:   22 (!) 206 kg (454 lb 2 4 oz)   22 (!) 201 kg (442 lb 7 4 oz)   22 (!) 211 kg (464 lb 8 2 oz)     · Clinically without exacerbation  · Lasix was held due to LARRY, may resume on d/c  · Continue Coumadin for anticoagulation  · Monitor volume status      Other specified hypothyroidism  Assessment & Plan  · Continue levothyroxine    VTE Pharmacologic Prophylaxis:   Pharmacologic: Warfarin (Coumadin)  Mechanical VTE Prophylaxis in Place: Yes    Patient Centered Rounds: I have performed bedside rounds with nursing staff today  Discussions with Specialists or Other Care Team Provider: CM    Education and Discussions with Family / Patient: patient    Time Spent for Care: 30 minutes  More than 50% of total time spent on counseling and coordination of care as described above  Current Length of Stay: 4 day(s)    Current Patient Status: Inpatient   Certification Statement: Patient was medically cleared, placement arranged, then patient appealed discharge  Discharge Plan: Hopefully, placement in SNF pending results of appeal      Code Status: Level 1 - Full Code      Subjective:   Patient seen and examined  No physical complaints offered  Objective:     Vitals:   Temp (24hrs), Av 7 °F (36 5 °C), Min:97 7 °F (36 5 °C), Max:97 7 °F (36 5 °C)    Temp:  [97 7 °F (36 5 °C)] 97 7 °F (36 5 °C)  HR:  [63-82] 63  Resp:  [19] 19  BP: (108-118)/(60-64) 118/64  SpO2:  [94 %-97 %] 94 %  Body mass index is 80 45 kg/m²  Input and Output Summary (last 24 hours): Intake/Output Summary (Last 24 hours) at 2022 1051  Last data filed at 2022 0852  Gross per 24 hour   Intake 240 ml   Output --   Net 240 ml       Physical Exam:     Physical Exam  Vitals and nursing note reviewed  Constitutional:       Appearance: She is obese  HENT:      Head: Normocephalic and atraumatic  Mouth/Throat:      Mouth: Mucous membranes are moist       Pharynx: Oropharynx is clear  Eyes:      Pupils: Pupils are equal, round, and reactive to light  Cardiovascular:      Rate and Rhythm: Normal rate and regular rhythm  Pulmonary:      Effort: Pulmonary effort is normal  No respiratory distress  Breath sounds: Normal breath sounds  Abdominal:      General: Bowel sounds are normal       Palpations: Abdomen is soft  Tenderness: There is no abdominal tenderness  Musculoskeletal:      Cervical back: Neck supple  Right lower leg: No edema  Left lower leg: No edema  Skin:     General: Skin is warm and dry  Capillary Refill: Capillary refill takes less than 2 seconds  Comments: Panniculitis  Neurological:      General: No focal deficit present  Mental Status: She is alert  Additional Data:     Labs:    Results from last 7 days   Lab Units 09/27/22  0527   WBC Thousand/uL 9 72   HEMOGLOBIN g/dL 10 3*   HEMATOCRIT % 34 2*   PLATELETS Thousands/uL 370   NEUTROS PCT % 75   LYMPHS PCT % 14   MONOS PCT % 7   EOS PCT % 4     Results from last 7 days   Lab Units 09/27/22  0527 09/26/22  0615   SODIUM mmol/L 137 136   POTASSIUM mmol/L 4 5 4 2   CHLORIDE mmol/L 101 100   CO2 mmol/L 31 31   BUN mg/dL 25 28*   CREATININE mg/dL 1 26 1 39*   ANION GAP mmol/L 5 5   CALCIUM mg/dL 9 0 8 8   ALBUMIN g/dL  --  2 9*   TOTAL BILIRUBIN mg/dL  --  0 59   ALK PHOS U/L  --  91   ALT U/L  --  6*   AST U/L  --  12*   GLUCOSE RANDOM mg/dL 107 102     Results from last 7 days   Lab Units 09/27/22  0527   INR  2 19*             Results from last 7 days   Lab Units 09/22/22  1809   LACTIC ACID mmol/L 1 4   PROCALCITONIN ng/ml 0 07           * I Have Reviewed All Lab Data Listed Above  * Additional Pertinent Lab Tests Reviewed:  [address] Admission Reviewed    Recent Cultures (last 7 days):     Results from last 7 days   Lab Units 09/22/22  1809   BLOOD CULTURE  No Growth After 4 Days  No Growth After 4 Days  Last 24 Hours Medication List:   Current Facility-Administered Medications   Medication Dose Route Frequency Provider Last Rate    acetaminophen  650 mg Oral Q8H PRN Natividad Hines PA-C      allopurinol  100 mg Oral Daily Natividad Hines PA-C      cefuroxime  500 mg Oral Q12H ORACIO Arcos      DULoxetine  20 mg Oral Daily Natividad Hines, [facility]      levothyroxine  125 mcg Oral Early Morning Natividad Hines PA-C      ondansetron  4 mg Intravenous Q6H PRN Neena Starks PA-C      oxyCODONE  5 mg Oral Q6H PRN Natividad Hines PA-C      polyethylene glycol  17 g Oral Daily Jv Edwards DO      saccharomyces boulardii  250 mg Oral BID Natividad Hines PA-C      senna-docusate sodium  1 tablet Oral HS Jv Edwards DO      triamcinolone   Topical Daily ORACIO Arcos      vancomycin  125 mg Oral Q12H Natividad Hines PA-C      warfarin  2 mg Oral Once per day on Mon Wed Fri Natividad Hines PA-C      warfarin  4 mg Oral Once per day on Sun Tue Thu Sat Natividad Hines PA-C          Today, Patient Was Seen By: ORACIO Arcos    ** Please Note: Dictation voice to text software may have been used in the creation of this document   **

## 2022-09-28 LAB
ANION GAP SERPL CALCULATED.3IONS-SCNC: 5 MMOL/L (ref 4–13)
BASOPHILS # BLD AUTO: 0.05 THOUSANDS/ΜL (ref 0–0.1)
BASOPHILS NFR BLD AUTO: 1 % (ref 0–1)
BUN SERPL-MCNC: 25 MG/DL (ref 5–25)
CALCIUM SERPL-MCNC: 8.9 MG/DL (ref 8.4–10.2)
CHLORIDE SERPL-SCNC: 102 MMOL/L (ref 96–108)
CO2 SERPL-SCNC: 30 MMOL/L (ref 21–32)
CREAT SERPL-MCNC: 1.29 MG/DL (ref 0.6–1.3)
EOSINOPHIL # BLD AUTO: 0.32 THOUSAND/ΜL (ref 0–0.61)
EOSINOPHIL NFR BLD AUTO: 4 % (ref 0–6)
ERYTHROCYTE [DISTWIDTH] IN BLOOD BY AUTOMATED COUNT: 16.7 % (ref 11.6–15.1)
GFR SERPL CREATININE-BSD FRML MDRD: 41 ML/MIN/1.73SQ M
GLUCOSE SERPL-MCNC: 106 MG/DL (ref 65–140)
HCT VFR BLD AUTO: 34.6 % (ref 34.8–46.1)
HGB BLD-MCNC: 10.4 G/DL (ref 11.5–15.4)
IMM GRANULOCYTES # BLD AUTO: 0.02 THOUSAND/UL (ref 0–0.2)
IMM GRANULOCYTES NFR BLD AUTO: 0 % (ref 0–2)
INR PPP: 2.29 (ref 0.84–1.19)
LYMPHOCYTES # BLD AUTO: 1.3 THOUSANDS/ΜL (ref 0.6–4.47)
LYMPHOCYTES NFR BLD AUTO: 14 % (ref 14–44)
MCH RBC QN AUTO: 24.9 PG (ref 26.8–34.3)
MCHC RBC AUTO-ENTMCNC: 30.1 G/DL (ref 31.4–37.4)
MCV RBC AUTO: 83 FL (ref 82–98)
MONOCYTES # BLD AUTO: 0.5 THOUSAND/ΜL (ref 0.17–1.22)
MONOCYTES NFR BLD AUTO: 5 % (ref 4–12)
NEUTROPHILS # BLD AUTO: 7.04 THOUSANDS/ΜL (ref 1.85–7.62)
NEUTS SEG NFR BLD AUTO: 76 % (ref 43–75)
NRBC BLD AUTO-RTO: 0 /100 WBCS
PLATELET # BLD AUTO: 378 THOUSANDS/UL (ref 149–390)
PMV BLD AUTO: 8.2 FL (ref 8.9–12.7)
POTASSIUM SERPL-SCNC: 4.1 MMOL/L (ref 3.5–5.3)
PROTHROMBIN TIME: 25.1 SECONDS (ref 11.6–14.5)
RBC # BLD AUTO: 4.17 MILLION/UL (ref 3.81–5.12)
SODIUM SERPL-SCNC: 137 MMOL/L (ref 135–147)
WBC # BLD AUTO: 9.23 THOUSAND/UL (ref 4.31–10.16)

## 2022-09-28 PROCEDURE — 85025 COMPLETE CBC W/AUTO DIFF WBC: CPT | Performed by: NURSE PRACTITIONER

## 2022-09-28 PROCEDURE — 80048 BASIC METABOLIC PNL TOTAL CA: CPT | Performed by: NURSE PRACTITIONER

## 2022-09-28 PROCEDURE — 99233 SBSQ HOSP IP/OBS HIGH 50: CPT | Performed by: NURSE PRACTITIONER

## 2022-09-28 PROCEDURE — 85610 PROTHROMBIN TIME: CPT | Performed by: NURSE PRACTITIONER

## 2022-09-28 PROCEDURE — 97166 OT EVAL MOD COMPLEX 45 MIN: CPT

## 2022-09-28 RX ORDER — FUROSEMIDE 40 MG/1
40 TABLET ORAL DAILY
Status: DISCONTINUED | OUTPATIENT
Start: 2022-09-29 | End: 2022-09-29 | Stop reason: HOSPADM

## 2022-09-28 RX ADMIN — CEFUROXIME AXETIL 500 MG: 250 TABLET, FILM COATED ORAL at 07:50

## 2022-09-28 RX ADMIN — ALLOPURINOL 100 MG: 100 TABLET ORAL at 08:45

## 2022-09-28 RX ADMIN — WARFARIN SODIUM 2 MG: 2 TABLET ORAL at 17:32

## 2022-09-28 RX ADMIN — VANCOMYCIN HYDROCHLORIDE 125 MG: 125 CAPSULE ORAL at 13:36

## 2022-09-28 RX ADMIN — CEFUROXIME AXETIL 500 MG: 250 TABLET, FILM COATED ORAL at 20:25

## 2022-09-28 RX ADMIN — Medication 250 MG: at 17:32

## 2022-09-28 RX ADMIN — VANCOMYCIN HYDROCHLORIDE 125 MG: 125 CAPSULE ORAL at 00:09

## 2022-09-28 RX ADMIN — TRIAMCINOLONE ACETONIDE: 1 CREAM TOPICAL at 08:45

## 2022-09-28 RX ADMIN — Medication 250 MG: at 08:45

## 2022-09-28 RX ADMIN — DULOXETINE 20 MG: 20 CAPSULE, DELAYED RELEASE ORAL at 08:45

## 2022-09-28 RX ADMIN — LEVOTHYROXINE SODIUM 125 MCG: 25 TABLET ORAL at 05:40

## 2022-09-28 NOTE — PROGRESS NOTES
Toby 45  Progress Note - Scarlett Berry 1950, 71 y o  female MRN: [id number]  Unit/Bed#: -Edwardo Encounter: [id number]  Primary Care Provider: Carrie Moralez MD   Date and time admitted to hospital: 9/22/2022  5:31 PM    Ambulatory dysfunction  Assessment & Plan  · PT OT case management for discharge planning  · Patient reports that her caregiver is unavailable to take care of her  · Case management arranged placement at [facility] in [address] [place] [address] today at 1100-however, patient is appealing her discharge-d/w patient at bedside   Patient agrees to be discharged to [facility] by noon tomorrow pending results of appeal    * Panniculitis  Assessment & Plan  · Left pannus/hip/thigh cellulitis - recurrent  · Levaquin switched to cefuroxime per ID pharmacist  · Follow-up on blood cultures-NGTD  · Surgery input appreciated  · Patient reports having an outpatient evaluation set up plastic surgery October 5, 2022 but is concerned she may miss it since here caregiver is unavailable to take care of her   · Patient's caretakers are currently on medical leave will need placement into SNF once medically stable  · Local wound care    History of Clostridioides difficile infection  Assessment & Plan  · Continue oral vancomycin for C diff prophylaxis 125 mg orally every 12 hours through 10/1/2022    Acute kidney injury superimposed on CKD [facility])  Assessment & Plan  Lab Results   Component Value Date    EGFR 41 09/28/2022    EGFR 42 09/27/2022    EGFR 38 09/26/2022    CREATININE 1 29 09/28/2022    CREATININE 1 26 09/27/2022    CREATININE 1 39 (H) 09/26/2022       · Developed mild LARRY-resolved   · Resume home lasix on d/c  · Monitor creatinine as an outpatient  · Renal ultrasound    BMI 70 and over, adult ([facility] [address] )  Assessment & Plan  · BMI 80 45  · Healthy diet recommended  · Consider referral to Bariatric Services post discharge    Chronic atrial fibrillation ([facility] [address] )  Assessment & Plan  · Continue Coumadin for anticoagulation purposes  · INR 2 19  · Rate is controlled    Depression with anxiety  Assessment & Plan  · Controlled  · Continue Cymbalta  · Supportive care    Chronic diastolic congestive heart failure (HCC)  Assessment & Plan  Wt Readings from Last 3 Encounters:   22 (!) 206 kg (454 lb 2 4 oz)   22 (!) 201 kg (442 lb 7 4 oz)   22 (!) 211 kg (464 lb 8 2 oz)     · Clinically without exacerbation  · Lasix was held due to LARRY, may resume on d/c  · Continue Coumadin for anticoagulation  · Monitor volume status      Other specified hypothyroidism  Assessment & Plan  · Continue levothyroxine    VTE Pharmacologic Prophylaxis:   Pharmacologic: Warfarin (Coumadin)  Mechanical VTE Prophylaxis in Place: Yes    Patient Centered Rounds: I have performed bedside rounds with nursing staff today  Discussions with Specialists or Other Care Team Provider: CM    Education and Discussions with Family / Patient: patient    Time Spent for Care: 30 minutes  More than 50% of total time spent on counseling and coordination of care as described above  Current Length of Stay: 5 day(s)    Current Patient Status: Inpatient   Certification Statement: The patient will continue to require additional inpatient hospital stay due to placement  Discharge Plan: [facility]    Code Status: Level 1 - Full Code      Subjective:   No complaints offered  Objective:     Vitals:   Temp (24hrs), Av 1 °F (36 7 °C), Min:98 °F (36 7 °C), Max:98 2 °F (36 8 °C)    Temp:  [98 °F (36 7 °C)-98 2 °F (36 8 °C)] 98 °F (36 7 °C)  HR:  [64-76] 68  Resp:  [18] 18  BP: (106-120)/(58-68) 118/58  SpO2:  [95 %-97 %] 97 %  Body mass index is 80 45 kg/m²  Input and Output Summary (last 24 hours):        Intake/Output Summary (Last 24 hours) at 2022 1606  Last data filed at 2022 1240  Gross per 24 hour   Intake 420 ml   Output --   Net 420 ml       Physical Exam:     Physical Exam  Vitals and nursing note reviewed  Constitutional:       General: She is not in acute distress  Appearance: She is obese  HENT:      Head: Normocephalic and atraumatic  Mouth/Throat:      Mouth: Mucous membranes are moist       Pharynx: Oropharynx is clear  Eyes:      Extraocular Movements: Extraocular movements intact  Pupils: Pupils are equal, round, and reactive to light  Cardiovascular:      Rate and Rhythm: Normal rate and regular rhythm  Pulmonary:      Effort: Pulmonary effort is normal  No respiratory distress  Breath sounds: Normal breath sounds  Abdominal:      General: Bowel sounds are normal       Palpations: Abdomen is soft  Tenderness: There is no abdominal tenderness  Musculoskeletal:      Cervical back: Neck supple  Skin:     General: Skin is warm and dry  Capillary Refill: Capillary refill takes less than 2 seconds  Comments: panniculitis   Neurological:      General: No focal deficit present  Mental Status: She is alert  Additional Data:     Labs:    Results from last 7 days   Lab Units 09/28/22  0539   WBC Thousand/uL 9 23   HEMOGLOBIN g/dL 10 4*   HEMATOCRIT % 34 6*   PLATELETS Thousands/uL 378   NEUTROS PCT % 76*   LYMPHS PCT % 14   MONOS PCT % 5   EOS PCT % 4     Results from last 7 days   Lab Units 09/28/22  0539 09/27/22  0527 09/26/22  0615   SODIUM mmol/L 137   < > 136   POTASSIUM mmol/L 4 1   < > 4 2   CHLORIDE mmol/L 102   < > 100   CO2 mmol/L 30   < > 31   BUN mg/dL 25   < > 28*   CREATININE mg/dL 1 29   < > 1 39*   ANION GAP mmol/L 5   < > 5   CALCIUM mg/dL 8 9   < > 8 8   ALBUMIN g/dL  --   --  2 9*   TOTAL BILIRUBIN mg/dL  --   --  0 59   ALK PHOS U/L  --   --  91   ALT U/L  --   --  6*   AST U/L  --   --  12*   GLUCOSE RANDOM mg/dL 106   < > 102    < > = values in this interval not displayed       Results from last 7 days   Lab Units 09/28/22  0539   INR  2 29*             Results from last 7 days   Lab Units 09/22/22  1809 LACTIC ACID mmol/L 1 4   PROCALCITONIN ng/ml 0 07           * I Have Reviewed All Lab Data Listed Above  * Additional Pertinent Lab Tests Reviewed: Tracy [address] Admission Reviewed    Recent Cultures (last 7 days):     Results from last 7 days   Lab Units 09/22/22  1809   BLOOD CULTURE  No Growth After 5 Days  No Growth After 5 Days  Last 24 Hours Medication List:   Current Facility-Administered Medications   Medication Dose Route Frequency Provider Last Rate    acetaminophen  650 mg Oral Q8H PRN Maya Bhagat PA-C      allopurinol  100 mg Oral Daily Maya MYA Bhagat      cefuroxime  500 mg Oral Q12H Brian Mcelroy, CRNP      DULoxetine  20 mg Oral Daily Maya Ol[place], [place]      [START ON 9/29/2022] furosemide  40 mg Oral Daily Brian Mcelroy CRADRIANNE      levothyroxine  125 mcg Oral Early Morning Mayayue Bhagat PA-C      ondansetron  4 mg Intravenous Q6H PRN Christopher Petty PA-C      oxyCODONE  5 mg Oral Q6H PRN Maya Bhagat PA-C      polyethylene glycol  17 g Oral Daily Bharat Guzman DO      saccharomyces boulardii  250 mg Oral BID Maya Bhagat PA-C      senna-docusate sodium  1 tablet Oral HS [facility]      triamcinolone   Topical Daily ORACIO Barreto      vancomycin  125 mg Oral Q12H Maya Bhagat PA-C      warfarin  2 mg Oral Once per day on Mon Wed Fri Maya Bhagat PA-C      warfarin  4 mg Oral Once per day on Sun Tue Thu Sat Maya Bhagat PA-C          Today, Patient Was Seen By: Brian Mcelroy    ** Please Note: Dictation voice to text software may have been used in the creation of this document   **

## 2022-09-28 NOTE — WOUND OSTOMY CARE
Consult Note - Wound   Royce Egan 71 y o  female MRN: [id number]  Unit/Bed#: -Edwardo Encounter: [id number]    History and Present Illness:  Wound care follow up for patient with chronic wound to the mid right abdomen beneath the pannus  This wound has been present for approx 3 months  Patient refuses Maxorb, Maxorb rope and Interdry  Per patient she gets a rash when using any of those products  Assessment Findings:   Patient in bed for assessment  She is pleasant, cooperative, alert and oriented  Saw patient Monday and ordered Puracol to the wound to the right of center abdomen that has stalled wound healing  Left abdominal fold wound is pink and dry, right abdominal partial thickness wounds in the fold is beefy red, linear, cluster of 6 small wounds, small amount of yellow serosanguineous drainage, cleansed and Puracol Plus placed in fold with 4X4  Patient agreed to [facility] and [address] [place] [address]  Skin and Wound Care Plan:   1  Elevate heels off the bed with pillows   2  Turn and reposition every two hours  3  Hydraguard to bilateral heels, buttocks and sacrum BID and PRN  4  Skin nourishing cream daily  5  Cleanse abdominal fold wounds with soap and water, pat dry  Left lateral linear abdominal fold wound apply Kenalog cream and cover with 4X4, change dressing daily and PRN  Place Puracol Plus to non-healing wound to the mid abdominal fold, and right inner fold of the pannus, cover with 4X4, change daily and PRN    Wound care referral placed    Wounds:  Wound 05/28/22 Cellulitis Abdomen Right; Lower (Active)   Wound Image   09/28/22 0959   Wound Description Beefy red 09/28/22 0959   Pressure Injury Stage 2 09/26/22 0915   Alison-wound Assessment Pink;Scar Tissue 09/28/22 0959   Wound Length (cm) 2 cm 09/28/22 0959   Wound Width (cm) 2 1 cm 09/28/22 0959   Wound Depth (cm) 0 1 cm 09/28/22 0959   Wound Surface Area (cm^2) 4 2 cm^2 09/28/22 0959   Wound Volume (cm^3) 0 42 cm^3 09/28/22 0959   Calculated Wound Volume (cm^3) 0 42 cm^3 09/28/22 0959   Change in Wound Size % 78 13 09/28/22 0959   Drainage Amount Small 09/28/22 0959   Drainage Description Serosanguineous; Yellow 09/28/22 0959   Treatments Cleansed 09/28/22 0959   Dressing Dry dressing; Other (Comment) 09/28/22 0959   Wound packed? No 09/26/22 1151   Dressing Changed Changed 09/28/22 0959   Patient Tolerance Tolerated well 09/28/22 0959   Dressing Status Intact 09/27/22 0930       Wound 09/26/22 MASD Abdomen Anterior;Right; Inner (Active)   Wound Image   09/28/22 0958   Wound Description Beefy red 09/28/22 0958   Alison-wound Assessment Pink; Intact 09/27/22 0930   Wound Length (cm) 0 5 cm 09/26/22 1153   Wound Width (cm) 1 cm 09/26/22 1153   Wound Depth (cm) 0 1 cm 09/26/22 1153   Wound Surface Area (cm^2) 0 5 cm^2 09/26/22 1153   Wound Volume (cm^3) 0 05 cm^3 09/26/22 1153   Calculated Wound Volume (cm^3) 0 05 cm^3 09/26/22 1153   Drainage Amount Scant 09/28/22 0958   Drainage Description Serosanguineous 09/28/22 0958   Non-staged Wound Description Partial thickness 09/28/22 0958   Treatments Cleansed 09/28/22 0958   Dressing Dry dressing 09/28/22 0958   Patient Tolerance Tolerated well 09/28/22 0958       Wound 09/26/22 Pelvis Anterior; Left (Active)   Wound Image   09/26/22 1155   Wound Description Pink;Dry 09/28/22 0959   Alison-wound Assessment Intact; Pink 09/27/22 0930   Wound Length (cm) 2 5 cm 09/26/22 1155   Wound Width (cm) 2 cm 09/26/22 1155   Wound Depth (cm) 0 1 cm 09/26/22 1155   Wound Surface Area (cm^2) 5 cm^2 09/26/22 1155   Wound Volume (cm^3) 0 5 cm^3 09/26/22 1155   Calculated Wound Volume (cm^3) 0 5 cm^3 09/26/22 1155   Drainage Amount None 09/27/22 0930   Non-staged Wound Description Partial thickness 09/26/22 1155   Dressing Dry dressing; Other (Comment) 09/27/22 0930   Dressing Changed New 09/26/22 1155   Patient Tolerance Tolerated well 09/27/22 0930     Reviewed plan of care with primary RN Elisabeth  Recommendations written as orders  Wound care team to follow weekly while admitted  Questions or concerns Paris Moon BSN, RN, [facility]

## 2022-09-28 NOTE — CASE MANAGEMENT
Case Management Discharge Planning Note    Patient name Jayjay Brennan  Location [address] 212/-01 MRN [id number]  : 1950 Date 2022       Current Admission Date: 2022  Current Admission Diagnosis:Panniculitis   Patient Active Problem List    Diagnosis Date Noted    BMI 70 and over, adult [facility]) 2022    Chronic atrial fibrillation ([facility] ) 2022    History of Clostridioides difficile infection 2022    Lymphedema of extremity 2021    Other specified hypothyroidism 10/03/2020    [facility] of [facility] disorder [facility] 10/03/2020    Idiopathic chronic 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 10/03/2020    Primary osteoarthritis involving multiple joints 10/03/2020    Chronic diastolic congestive heart failure ([facility] ) 10/03/2020    Panniculitis 10/03/2020    Gastroesophageal reflux disease without esophagitis 10/03/2020    Hx MRSA infection 2020    Functional gait abnormality 2019    Impaired mobility 2019    Osteoarthritis of glenohumeral joint 2019    Left ovarian cyst 2017    Depression with anxiety 07/15/2017    Anemia 2016    Ambulatory dysfunction 2016    Acute kidney injury superimposed on CKD ([facility] ) 2016    Adjustment disorder 2013    Irritable bowel syndrome 2012    Left atrial enlargement 2012    Left ventricular hypertrophy 2012    Carpal tunnel syndrome 2012    Gout 2012    Hyperlipidemia 2012    Symptomatic menopausal or female climacteric states 2012      LOS (days): 5  Geometric Mean LOS (GMLOS) (days): 3 20  Days to GMLOS:-1 7     OBJECTIVE:  Risk of Unplanned Readmission Score: 38 83         Current admission status: Inpatient   Preferred Pharmacy:    [facility] Jamal Diamond [address] [place] [address]  Phone: [phone] Fax: [phone] [address] [place], [place] - [address]  Phone: [phone] Fax: [phone]    Primary Care Provider: Enriqueta Ball MD    Primary Insurance: MEDICARE  Secondary Insurance: AARP    DISCHARGE DETAILS:    Discharge planning discussed with[de-identified] Patient  Freedom of Choice: Yes        Would you like to participate in our [address] service program?  : No - Declined    Treatment Team Recommendation: Short Term Rehab  Discharge Destination Plan[de-identified] Short Term Rehab  Transport at Discharge : [facility] Ambulance     Additional Comments: Pt Medicare discharge appeal is pending  CM met with pt at bedside to discuss same  The only facility within 100 miles that is willing to accept her under her circumstances (pt weight, lack of caregiver at home, potential for long term placement) is [facility] are definitively not able to accept  Pt made aware  She is agreeable to go to [facility] once [employer] appeal decision has been reached  Message sent to [facility] via [employer] to update them on status and determine if they are able to hold the bed until determination is reached  CM to follow through discharge

## 2022-09-28 NOTE — PLAN OF CARE
Problem: INFECTION - ADULT  Goal: Absence or prevention of progression during hospitalization  Description: INTERVENTIONS:  - Assess and monitor for signs and symptoms of infection  - Monitor lab/diagnostic results  - Monitor all insertion sites, i e  indwelling lines, tubes, and drains  - Monitor endotracheal if appropriate and nasal secretions for changes in amount and color  - [place] appropriate cooling/warming therapies per order  - Administer medications as ordered  - Instruct and encourage patient and family to use good hand hygiene technique  - Identify and instruct in appropriate isolation precautions for identified infection/condition  Outcome: Progressing     Problem: SAFETY ADULT  Goal: Patient will remain free of falls  Description: INTERVENTIONS:  - Educate patient/family on patient safety including physical limitations  - Instruct patient to call for assistance with activity   - Consult OT/PT to assist with strengthening/mobility   - Keep Call bell within reach  - Keep bed low and locked with side rails adjusted as appropriate  - Keep care items and personal belongings within reach  - Initiate and maintain comfort rounds  - Make Fall Risk Sign visible to staff  - Offer Toileting in advance of need  - Initiate/Maintain bed alarm  - Obtain necessary fall risk management equipment:   - Apply yellow socks and bracelet for high fall risk patients  - Consider moving patient to room near nurses station  Outcome: Progressing  Goal: Maintain or return to baseline ADL function  Description: INTERVENTIONS:  -  Assess patient's ability to carry out ADLs; assess patient's baseline for ADL function and identify physical deficits which impact ability to perform ADLs (bathing, care of mouth/teeth, toileting, grooming, dressing, etc )  - Assess/evaluate cause of self-care deficits   - Assess range of motion  - Assess patient's mobility; develop plan if impaired  - Assess patient's need for assistive devices and provide as appropriate  - Encourage maximum independence but intervene and supervise when necessary  - Involve family in performance of ADLs  - Assess for home care needs following discharge   - Consider OT consult to assist with ADL evaluation and planning for discharge  - Provide patient education as appropriate  Outcome: Progressing  Goal: Maintains/Returns to pre admission functional level  Description: INTERVENTIONS:  - Perform BMAT or MOVE assessment daily    - Set and communicate daily mobility goal to care team and patient/family/caregiver  - Collaborate with rehabilitation services on mobility goals if consulted  - Record patient progress and toleration of activity level   Outcome: Progressing     Problem: DISCHARGE PLANNING  Goal: Discharge to home or other facility with appropriate resources  Description: INTERVENTIONS:  - Identify barriers to discharge w/patient and caregiver  - Arrange for needed discharge resources and transportation as appropriate  - Identify discharge learning needs (meds, wound care, etc )  - Refer to Case Management Department for coordinating discharge planning if the patient needs post-hospital services based on physician/advanced practitioner order or complex needs related to functional status, cognitive ability, or social support system  Outcome: Progressing     Problem: Knowledge Deficit  Goal: Patient/family/caregiver demonstrates understanding of disease process, treatment plan, medications, and discharge instructions  Description: Complete learning assessment and assess knowledge base    Interventions:  - Provide teaching at level of understanding  - Provide teaching via preferred learning methods  Outcome: Progressing     Problem: MUSCULOSKELETAL - ADULT  Goal: Maintain or return mobility to safest level of function  Description: INTERVENTIONS:  - Assess patient's ability to carry out ADLs; assess patient's baseline for ADL function and identify physical deficits which impact ability to perform ADLs (bathing, care of mouth/teeth, toileting, grooming, dressing, etc )  - Assess/evaluate cause of self-care deficits   - Assess range of motion  - Assess patient's mobility  - Assess patient's need for assistive devices and provide as appropriate  - Encourage maximum independence but intervene and supervise when necessary  - Involve family in performance of ADLs  - Assess for home care needs following discharge   - Consider OT consult to assist with ADL evaluation and planning for discharge  - Provide patient education as appropriate  Outcome: Progressing  Goal: Maintain proper alignment of affected body part  Description: INTERVENTIONS:  - Support, maintain and protect limb and body alignment  - Provide patient/ family with appropriate education  Outcome: Progressing     Problem: MOBILITY - ADULT  Goal: Maintain or return to baseline ADL function  Description: INTERVENTIONS:  -  Assess patient's ability to carry out ADLs; assess patient's baseline for ADL function and identify physical deficits which impact ability to perform ADLs (bathing, care of mouth/teeth, toileting, grooming, dressing, etc )  - Assess/evaluate cause of self-care deficits   - Assess range of motion  - Assess patient's mobility; develop plan if impaired  - Assess patient's need for assistive devices and provide as appropriate  - Encourage maximum independence but intervene and supervise when necessary  - Involve family in performance of ADLs  - Assess for home care needs following discharge   - Consider OT consult to assist with ADL evaluation and planning for discharge  - Provide patient education as appropriate  Outcome: Progressing  Goal: Maintains/Returns to pre admission functional level  Description: INTERVENTIONS:  - Perform BMAT or MOVE assessment daily    - Set and communicate daily mobility goal to care team and patient/family/caregiver     - Collaborate with rehabilitation services on mobility goals if consulted  - Record patient progress and toleration of activity level   Outcome: Progressing

## 2022-09-28 NOTE — ASSESSMENT & PLAN NOTE
Lab Results   Component Value Date    EGFR 41 09/28/2022    EGFR 42 09/27/2022    EGFR 38 09/26/2022    CREATININE 1 29 09/28/2022    CREATININE 1 26 09/27/2022    CREATININE 1 39 (H) 09/26/2022       · Developed mild LARRY-resolved   · Resume home lasix on d/c  · Monitor creatinine as an outpatient  · Renal ultrasound

## 2022-09-28 NOTE — OCCUPATIONAL THERAPY NOTE
Occupational Therapy Evaluation      Vel Farfan    9/28/2022    Principal Problem:    Panniculitis  Active Problems:    Other specified hypothyroidism    Chronic diastolic congestive heart failure (HCC)    Ambulatory dysfunction    Acute kidney injury superimposed on CKD ([facility] )    Depression with anxiety    History of Clostridioides difficile infection    Chronic atrial fibrillation (HCC)    BMI 70 and over, adult [facility])      Past Medical History:   Diagnosis Date    Atrial fibrillation ([place] [address] )     Bladder stone     C  difficile colitis     Cellulitis     CHF (congestive heart failure) ([facility])     Depression with anxiety     Disease of thyroid gland     Diverticulitis     Enterocolitis     History of abnormal cervical Pap smear     Kidney stone     Lymphedema     Menopause     Age 53    Morbid obesity with BMI of 70 and over, adult ([place] [address] )     MRSA (methicillin resistant Staphylococcus aureus)     abdominal wound    Osteoarthritis     Phlebitis     left lower leg    Spondylolysis        Past Surgical History:   Procedure Laterality Date    APPENDECTOMY      CARPAL TUNNEL RELEASE      CHOLECYSTECTOMY      CYSTOSCOPY      stent    FOOT SURGERY  1982    bone spur    KNEE SURGERY      WISDOM TOOTH EXTRACTION          09/28/22 0855   OT Last Visit   OT Visit Date 09/28/22   Note Type   Note type Evaluation   Restrictions/Precautions   Weight Bearing Precautions Per Order No   Pain Assessment   Pain Assessment Tool 0-10   Pain Score 6   Pain Location/Orientation Location: Generalized  ("in the joints I have arthritis, mostly my shoulders")   Pain Onset/Description Onset: Ongoing; Descriptor: Aching   Home Living   Type of [address] One level;Performs ADLs on one level; Able to live on main level with bedroom/bathroom; Ramped entrance   Bathroom Shower/Tub   (pt sponge bathes/bed bound)   Bathroom Accessibility Not accessible   [id number] [facility] bed  (pt reports being bedbound PTA)   Additional Comments pt reports being bed bound 3-4 years   Prior Function   Level of [place] Needs assistance with ADLs and functional mobility; Needs assistance with IADLs   Lives With Other (Comment)  (CGs 8am-8pm 7 days/wk; sister and nephew visit weekly)   ADL Assistance Needs assistance   IADLs Needs assistance   Falls in the last 6 months 0   Vocational Retired  (nurse)   Comments Pt reports at baseline she had assistance from her caregivers who came 12 hours/day  Caregivers would assist with sponge bathing, dressing, toileting using bed pan, and assisting her in exercises including UE and LE TE   ADL   UB Bathing Assistance 4  Minimal Assistance   LB Bathing Assistance 2  Maximal Assistance   UB Dressing Assistance 3  Moderate Assistance   LB Dressing Assistance 1  Total Assistance   Bed Mobility   Rolling R 2  Maximal assistance   Additional items Assist x 1;Bedrails; Increased time required;Verbal cues;LE management   Rolling L 2  Maximal assistance   Additional items Assist x 1;Bedrails; Increased time required;Verbal cues;LE management   Supine to Sit Unable to assess  (due to baseline bedbound status)   Transfers   Sit to Stand Unable to assess   Activity Tolerance   Activity Tolerance Patient limited by fatigue;Patient limited by pain   RUE Assessment   RUE Assessment X  (shoulder AROM ~90 degrees)   RUE Strength   RUE Overall Strength Deficits  (3/5)   LUE Assessment   LUE Assessment X  (AROM WFL)   LUE Strength   LUE Overall Strength Deficits  (3/5)   Vision-Basic Assessment   Current Vision No visual deficits   Cognition   Overall Cognitive Status WFL   Arousal/Participation Alert; Cooperative   Attention Within functional limits   Orientation Level Oriented X4   Memory Within functional limits   Following Commands Follows all commands and directions without difficulty   Goals   Patient Goals To keep plastic surgery appointment   Plan   Goal Expiration Date 09/28/22   OT Treatment Day 0   OT Frequency Aissatou[place] only   Recommendation   OT Discharge Recommendation No rehabilitation needs   Additional Comments  Pt is a 71 y o  female seen for OT evaluation s/p [place] [id number] on 9/22/2022 w/ Panniculitis  Comorbidities affecting pt's functional performance at time of assessment include: CHF, Ambulatory dysfunction, Depression and Anxiety, A-fib, LARRY  Personal factors affecting pt at time of IE include:limited home support  Prior to admission, pt required A with ADLs and is currently at baseline funciton  From OT standpoint, recommendation at time of d/c would be no rehab needs     AM-PAC Daily Activity Inpatient   Lower Body Dressing 1   Bathing 2   Toileting 1   Upper Body Dressing 2   Grooming 4   Eating 4   Daily Activity Raw Score 14   Daily Activity Standardized Score (Calc for Raw Score >=11) 33 39   AM-PAC Applied Cognition Inpatient   Following a Speech/Presentation 4   Understanding Ordinary Conversation 4   Taking Medications 4   Remembering Where Things Are Placed or Put Away 4   Remembering List of 4-5 Errands 4   Taking Care of Complicated Tasks 4   Applied Cognition Raw Score 24   Applied Cognition Standardized Score 62 21     Raffi Ramos MS, OTR/L

## 2022-09-29 VITALS
RESPIRATION RATE: 19 BRPM | BODY MASS INDEX: 51.91 KG/M2 | SYSTOLIC BLOOD PRESSURE: 108 MMHG | HEART RATE: 70 BPM | TEMPERATURE: 97.5 F | WEIGHT: 293 LBS | OXYGEN SATURATION: 96 % | HEIGHT: 63 IN | DIASTOLIC BLOOD PRESSURE: 61 MMHG

## 2022-09-29 LAB
ANION GAP SERPL CALCULATED.3IONS-SCNC: 6 MMOL/L (ref 4–13)
BASOPHILS # BLD AUTO: 0.04 THOUSANDS/ΜL (ref 0–0.1)
BASOPHILS NFR BLD AUTO: 0 % (ref 0–1)
BUN SERPL-MCNC: 27 MG/DL (ref 5–25)
CALCIUM SERPL-MCNC: 8.9 MG/DL (ref 8.4–10.2)
CHLORIDE SERPL-SCNC: 102 MMOL/L (ref 96–108)
CO2 SERPL-SCNC: 29 MMOL/L (ref 21–32)
CREAT SERPL-MCNC: 1.19 MG/DL (ref 0.6–1.3)
EOSINOPHIL # BLD AUTO: 0.37 THOUSAND/ΜL (ref 0–0.61)
EOSINOPHIL NFR BLD AUTO: 4 % (ref 0–6)
ERYTHROCYTE [DISTWIDTH] IN BLOOD BY AUTOMATED COUNT: 16.6 % (ref 11.6–15.1)
GFR SERPL CREATININE-BSD FRML MDRD: 46 ML/MIN/1.73SQ M
GLUCOSE SERPL-MCNC: 135 MG/DL (ref 65–140)
HCT VFR BLD AUTO: 34.5 % (ref 34.8–46.1)
HGB BLD-MCNC: 10.3 G/DL (ref 11.5–15.4)
IMM GRANULOCYTES # BLD AUTO: 0.04 THOUSAND/UL (ref 0–0.2)
IMM GRANULOCYTES NFR BLD AUTO: 0 % (ref 0–2)
INR PPP: 2.23 (ref 0.84–1.19)
LYMPHOCYTES # BLD AUTO: 1.27 THOUSANDS/ΜL (ref 0.6–4.47)
LYMPHOCYTES NFR BLD AUTO: 12 % (ref 14–44)
MCH RBC QN AUTO: 25.4 PG (ref 26.8–34.3)
MCHC RBC AUTO-ENTMCNC: 29.9 G/DL (ref 31.4–37.4)
MCV RBC AUTO: 85 FL (ref 82–98)
MONOCYTES # BLD AUTO: 0.49 THOUSAND/ΜL (ref 0.17–1.22)
MONOCYTES NFR BLD AUTO: 5 % (ref 4–12)
NEUTROPHILS # BLD AUTO: 8.42 THOUSANDS/ΜL (ref 1.85–7.62)
NEUTS SEG NFR BLD AUTO: 79 % (ref 43–75)
NRBC BLD AUTO-RTO: 0 /100 WBCS
PLATELET # BLD AUTO: 380 THOUSANDS/UL (ref 149–390)
PMV BLD AUTO: 8.4 FL (ref 8.9–12.7)
POTASSIUM SERPL-SCNC: 4.1 MMOL/L (ref 3.5–5.3)
PROTHROMBIN TIME: 24.6 SECONDS (ref 11.6–14.5)
RBC # BLD AUTO: 4.05 MILLION/UL (ref 3.81–5.12)
SARS-COV-2 RNA RESP QL NAA+PROBE: NEGATIVE
SODIUM SERPL-SCNC: 137 MMOL/L (ref 135–147)
WBC # BLD AUTO: 10.63 THOUSAND/UL (ref 4.31–10.16)

## 2022-09-29 PROCEDURE — 87635 SARS-COV-2 COVID-19 AMP PRB: CPT | Performed by: NURSE PRACTITIONER

## 2022-09-29 PROCEDURE — 80048 BASIC METABOLIC PNL TOTAL CA: CPT | Performed by: NURSE PRACTITIONER

## 2022-09-29 PROCEDURE — 85025 COMPLETE CBC W/AUTO DIFF WBC: CPT | Performed by: NURSE PRACTITIONER

## 2022-09-29 PROCEDURE — 85610 PROTHROMBIN TIME: CPT | Performed by: NURSE PRACTITIONER

## 2022-09-29 PROCEDURE — 99238 HOSP IP/OBS DSCHRG MGMT 30/<: CPT | Performed by: PHYSICIAN ASSISTANT

## 2022-09-29 RX ADMIN — LEVOTHYROXINE SODIUM 125 MCG: 25 TABLET ORAL at 05:06

## 2022-09-29 RX ADMIN — VANCOMYCIN HYDROCHLORIDE 125 MG: 125 CAPSULE ORAL at 00:49

## 2022-09-29 RX ADMIN — Medication 250 MG: at 09:57

## 2022-09-29 RX ADMIN — DULOXETINE 20 MG: 20 CAPSULE, DELAYED RELEASE ORAL at 09:57

## 2022-09-29 RX ADMIN — VANCOMYCIN HYDROCHLORIDE 125 MG: 125 CAPSULE ORAL at 12:36

## 2022-09-29 RX ADMIN — TRIAMCINOLONE ACETONIDE: 1 CREAM TOPICAL at 10:01

## 2022-09-29 RX ADMIN — OXYCODONE HYDROCHLORIDE 5 MG: 5 TABLET ORAL at 14:51

## 2022-09-29 RX ADMIN — ALLOPURINOL 100 MG: 100 TABLET ORAL at 09:57

## 2022-09-29 NOTE — ASSESSMENT & PLAN NOTE
· PT OT case management for discharge planning  · Patient reports that her caregiver is unavailable to take care of her  · Case management arranged placement at [facility] in [facility] on 9/28/22- however, patient attempted to appeal her discharge- this was denied  Patient  Was discharged to [facility]

## 2022-09-29 NOTE — ASSESSMENT & PLAN NOTE
· Left pannus/hip/thigh cellulitis - recurrent  · Levaquin switched to cefuroxime per ID pharmacist  · Blood cultures-NGTD  · Surgery input appreciated  · Patient reports having an outpatient evaluation set up plastic surgery October 5, 2022 but is concerned she may miss it since here caregiver is unavailable to take care of her   · Patient's caretakers are currently on medical leave will needed placement into SNF   · The patient was accepted at [facility]

## 2022-09-29 NOTE — NURSING NOTE
Patient discharged via ambulance transport to [facility] in [place]  Report called to [facility] at facility, papers faxed

## 2022-09-29 NOTE — PLAN OF CARE
Problem: PAIN - ADULT  Goal: Verbalizes/displays adequate comfort level or baseline comfort level  Description: Interventions:  - Encourage patient to monitor pain and request assistance  - Assess pain using appropriate pain scale  - Administer analgesics based on type and severity of pain and evaluate response  - Implement non-pharmacological measures as appropriate and evaluate response  - Consider cultural and social influences on pain and pain management  - Notify physician/advanced practitioner if interventions unsuccessful or patient reports new pain  Outcome: Progressing     Problem: INFECTION - ADULT  Goal: Absence or prevention of progression during hospitalization  Description: INTERVENTIONS:  - Assess and monitor for signs and symptoms of infection  - Monitor lab/diagnostic results  - Monitor all insertion sites, i e  indwelling lines, tubes, and drains  - Monitor endotracheal if appropriate and nasal secretions for changes in amount and color  - [place] appropriate cooling/warming therapies per order  - Administer medications as ordered  - Instruct and encourage patient and family to use good hand hygiene technique  - Identify and instruct in appropriate isolation precautions for identified infection/condition  Outcome: Progressing     Problem: GASTROINTESTINAL - ADULT  Goal: Minimal or absence of nausea and/or vomiting  Description: INTERVENTIONS:  - Administer IV fluids if ordered to ensure adequate hydration  - Maintain NPO status until nausea and vomiting are resolved  - Nasogastric tube if ordered  - Administer ordered antiemetic medications as needed  - Provide nonpharmacologic comfort measures as appropriate  - Advance diet as tolerated, if ordered  - Consider nutrition services referral to assist patient with adequate nutrition and appropriate food choices  Outcome: Progressing  Goal: Maintains or returns to baseline bowel function  Description: INTERVENTIONS:  - Assess bowel function  - Encourage oral fluids to ensure adequate hydration  - Administer IV fluids if ordered to ensure adequate hydration  - Administer ordered medications as needed  - Encourage mobilization and activity  - Consider nutritional services referral to assist patient with adequate nutrition and appropriate food choices  Outcome: Progressing

## 2022-09-29 NOTE — ASSESSMENT & PLAN NOTE
Lab Results   Component Value Date    EGFR 46 09/29/2022    EGFR 41 09/28/2022    EGFR 42 09/27/2022    CREATININE 1 19 09/29/2022    CREATININE 1 29 09/28/2022    CREATININE 1 26 09/27/2022       · Developed mild LARRY- now resolved   · Resume home lasix on d/c  · Monitor creatinine as an outpatient

## 2022-09-29 NOTE — PLAN OF CARE
Problem: Prexisting or High Potential for Compromised Skin Integrity  Goal: Skin integrity is maintained or improved  Description: INTERVENTIONS:  - Identify patients at risk for skin breakdown  - Assess and monitor skin integrity  - Assess and monitor nutrition and hydration status  - Monitor labs   - Assess for incontinence   - Turn and reposition patient  - Assist with mobility/ambulation  - Relieve pressure over bony prominences  - Avoid friction and shearing  - Provide appropriate hygiene as needed including keeping skin clean and dry  - Evaluate need for skin moisturizer/barrier cream  - Collaborate with interdisciplinary team   - Patient/family teaching  - Consider wound care consult   Outcome: Progressing     Problem: PAIN - ADULT  Goal: Verbalizes/displays adequate comfort level or baseline comfort level  Description: Interventions:  - Encourage patient to monitor pain and request assistance  - Assess pain using appropriate pain scale  - Administer analgesics based on type and severity of pain and evaluate response  - Implement non-pharmacological measures as appropriate and evaluate response  - Consider cultural and social influences on pain and pain management  - Notify physician/advanced practitioner if interventions unsuccessful or patient reports new pain  Outcome: Progressing     Problem: INFECTION - ADULT  Goal: Absence or prevention of progression during hospitalization  Description: INTERVENTIONS:  - Assess and monitor for signs and symptoms of infection  - Monitor lab/diagnostic results  - Monitor all insertion sites, i e  indwelling lines, tubes, and drains  - Monitor endotracheal if appropriate and nasal secretions for changes in amount and color  - [place] appropriate cooling/warming therapies per order  - Administer medications as ordered  - Instruct and encourage patient and family to use good hand hygiene technique  - Identify and instruct in appropriate isolation precautions for identified infection/condition  Outcome: Progressing     Problem: SAFETY ADULT  Goal: Patient will remain free of falls  Description: INTERVENTIONS:  - Educate patient/family on patient safety including physical limitations  - Instruct patient to call for assistance with activity   - Consult OT/PT to assist with strengthening/mobility   - Keep Call bell within reach  - Keep bed low and locked with side rails adjusted as appropriate  - Keep care items and personal belongings within reach  - Initiate and maintain comfort rounds  - Make Fall Risk Sign visible to staff  - Offer Toileting in advance of need  - Initiate/Maintain bed alarm  - Obtain necessary fall risk management equipment:   - Apply yellow socks and bracelet for high fall risk patients  - Consider moving patient to room near nurses station  Outcome: Progressing  Goal: Maintain or return to baseline ADL function  Description: INTERVENTIONS:  -  Assess patient's ability to carry out ADLs; assess patient's baseline for ADL function and identify physical deficits which impact ability to perform ADLs (bathing, care of mouth/teeth, toileting, grooming, dressing, etc )  - Assess/evaluate cause of self-care deficits   - Assess range of motion  - Assess patient's mobility; develop plan if impaired  - Assess patient's need for assistive devices and provide as appropriate  - Encourage maximum independence but intervene and supervise when necessary  - Involve family in performance of ADLs  - Assess for home care needs following discharge   - Consider OT consult to assist with ADL evaluation and planning for discharge  - Provide patient education as appropriate  Outcome: Progressing  Goal: Maintains/Returns to pre admission functional level  Description: INTERVENTIONS:  - Perform BMAT or MOVE assessment daily    - Set and communicate daily mobility goal to care team and patient/family/caregiver     - Collaborate with rehabilitation services on mobility goals if consulted  - Record patient progress and toleration of activity level   Outcome: Progressing     Problem: DISCHARGE PLANNING  Goal: Discharge to home or other facility with appropriate resources  Description: INTERVENTIONS:  - Identify barriers to discharge w/patient and caregiver  - Arrange for needed discharge resources and transportation as appropriate  - Identify discharge learning needs (meds, wound care, etc )  - Refer to Case Management Department for coordinating discharge planning if the patient needs post-hospital services based on physician/advanced practitioner order or complex needs related to functional status, cognitive ability, or social support system  Outcome: Progressing     Problem: Knowledge Deficit  Goal: Patient/family/caregiver demonstrates understanding of disease process, treatment plan, medications, and discharge instructions  Description: Complete learning assessment and assess knowledge base    Interventions:  - Provide teaching at level of understanding  - Provide teaching via preferred learning methods  Outcome: Progressing     Problem: GASTROINTESTINAL - ADULT  Goal: Minimal or absence of nausea and/or vomiting  Description: INTERVENTIONS:  - Administer IV fluids if ordered to ensure adequate hydration  - Maintain NPO status until nausea and vomiting are resolved  - Nasogastric tube if ordered  - Administer ordered antiemetic medications as needed  - Provide nonpharmacologic comfort measures as appropriate  - Advance diet as tolerated, if ordered  - Consider nutrition services referral to assist patient with adequate nutrition and appropriate food choices  Outcome: Progressing  Goal: Maintains or returns to baseline bowel function  Description: INTERVENTIONS:  - Assess bowel function  - Encourage oral fluids to ensure adequate hydration  - Administer IV fluids if ordered to ensure adequate hydration  - Administer ordered medications as needed  - Encourage mobilization and activity  - Consider nutritional services referral to assist patient with adequate nutrition and appropriate food choices  Outcome: Progressing  Goal: Maintains adequate nutritional intake  Description: INTERVENTIONS:  - Monitor percentage of each meal consumed  - Identify factors contributing to decreased intake, treat as appropriate  - Assist with meals as needed  - Monitor I&O, weight, and lab values if indicated  - Obtain nutrition services referral as needed  Outcome: Progressing  Goal: Establish and maintain optimal ostomy function  Description: INTERVENTIONS:  - Assess bowel function  - Encourage oral fluids to ensure adequate hydration  - Administer IV fluids if ordered to ensure adequate hydration   - Administer ordered medications as needed  - Encourage mobilization and activity  - Nutrition services referral to assist patient with appropriate food choices  - Assess stoma site  - Consider wound care consult   Outcome: Progressing  Goal: Oral mucous membranes remain intact  Description: INTERVENTIONS  - Assess oral mucosa and hygiene practices  - Implement preventative oral hygiene regimen  - Implement oral medicated treatments as ordered  - Initiate Nutrition services referral as needed  Outcome: Progressing     Problem: METABOLIC, FLUID AND ELECTROLYTES - ADULT  Goal: Electrolytes maintained within normal limits  Description: INTERVENTIONS:  - Monitor labs and assess patient for signs and symptoms of electrolyte imbalances  - Administer electrolyte replacement as ordered  - Monitor response to electrolyte replacements, including repeat lab results as appropriate  - Instruct patient on fluid and nutrition as appropriate  Outcome: Progressing  Goal: Fluid balance maintained  Description: INTERVENTIONS:  - Monitor labs   - Monitor I/O and WT  - Instruct patient on fluid and nutrition as appropriate  - Assess for signs & symptoms of volume excess or deficit  Outcome: Progressing  Goal: Glucose maintained within target range  Description: INTERVENTIONS:  - Monitor Blood Glucose as ordered  - Assess for signs and symptoms of hyperglycemia and hypoglycemia  - Administer ordered medications to maintain glucose within target range  - Assess nutritional intake and initiate nutrition service referral as needed  Outcome: Progressing     Problem: SKIN/TISSUE INTEGRITY - ADULT  Goal: Skin Integrity remains intact(Skin Breakdown Prevention)  Description: Assess:  -Perform [place] assessment every shift  -Clean and moisturize skin  -Inspect skin when repositioning, toileting, and assisting with ADLS  -Assess under medical devices   -Assess extremities for adequate circulation and sensation     Bed Management:  -Have minimal linens on bed & keep smooth, unwrinkled  -Change linens as needed when moist or perspiring  -Avoid sitting or lying in one position while in bed    Toileting:  -Offer bedside commode  -Assess for incontinence   -Use incontinent care products after each incontinent episode     Activity:  -Encourage or provide ROM exercises   -Turn and reposition patient every two Hours  -Use appropriate equipment to lift or move patient in bed  -Instruct/ Assist with weight shifting when out of bed in chair  -Consider limitation of chair time     Skin Care:  -Avoid use of baby powder, tape, friction and shearing, hot water or constrictive clothing  -Relieve pressure over bony prominences  -Do not massage red bony areas    Next Steps:  -Teach patient strategies to minimize risks   -Consider consults to  interdisciplinary teams   Outcome: Progressing  Goal: Incision(s), wounds(s) or drain site(s) healing without S/S of infection  Description: INTERVENTIONS  - Assess and document dressing, incision, wound bed, drain sites and surrounding tissue  - Provide patient and family education  - Perform skin care/dressing changes per order  Outcome: Progressing  Goal: Pressure injury heals and does not worsen  Description: Interventions:  - Implement low air loss mattress or specialty surface (Criteria met)  - Apply silicone foam dressing  - Instruct/assist with weight shifting   - Use special pressure reducing interventions   - Apply fecal or urinary incontinence containment device   - Perform passive or active ROM  - Turn and reposition patient & offload bony prominences    - Utilize friction reducing device or surface for transfers   - Consider consults to  interdisciplinary teams  - Use incontinent care products after each incontinent episode   - Consider nutrition services referral as needed  Outcome: Progressing     Problem: MUSCULOSKELETAL - ADULT  Goal: Maintain or return mobility to safest level of function  Description: INTERVENTIONS:  - Assess patient's ability to carry out ADLs; assess patient's baseline for ADL function and identify physical deficits which impact ability to perform ADLs (bathing, care of mouth/teeth, toileting, grooming, dressing, etc )  - Assess/evaluate cause of self-care deficits   - Assess range of motion  - Assess patient's mobility  - Assess patient's need for assistive devices and provide as appropriate  - Encourage maximum independence but intervene and supervise when necessary  - Involve family in performance of ADLs  - Assess for home care needs following discharge   - Consider OT consult to assist with ADL evaluation and planning for discharge  - Provide patient education as appropriate  Outcome: Progressing  Goal: Maintain proper alignment of affected body part  Description: INTERVENTIONS:  - Support, maintain and protect limb and body alignment  - Provide patient/ family with appropriate education  Outcome: Progressing     Problem: MOBILITY - ADULT  Goal: Maintain or return to baseline ADL function  Description: INTERVENTIONS:  -  Assess patient's ability to carry out ADLs; assess patient's baseline for ADL function and identify physical deficits which impact ability to perform ADLs (bathing, care of mouth/teeth, toileting, grooming, dressing, etc )  - Assess/evaluate cause of self-care deficits   - Assess range of motion  - Assess patient's mobility; develop plan if impaired  - Assess patient's need for assistive devices and provide as appropriate  - Encourage maximum independence but intervene and supervise when necessary  - Involve family in performance of ADLs  - Assess for home care needs following discharge   - Consider OT consult to assist with ADL evaluation and planning for discharge  - Provide patient education as appropriate  Outcome: Progressing  Goal: Maintains/Returns to pre admission functional level  Description: INTERVENTIONS:  - Perform BMAT or MOVE assessment daily    - Set and communicate daily mobility goal to care team and patient/family/caregiver     - Collaborate with rehabilitation services on mobility goals if consulted  - Record patient progress and toleration of activity level   Outcome: Progressing

## 2022-09-29 NOTE — CASE MANAGEMENT
Case Management Discharge Planning Note    Patient name Josh Blanc  Location [id number] MRN [id number]  : 1950 Date 2022       Current Admission Date: 2022  Current Admission Diagnosis:Panniculitis   Patient Active Problem List    Diagnosis Date Noted    BMI 70 and over, adult [facility]) 2022    Chronic atrial fibrillation ([facility] ) 2022    History of Clostridioides difficile infection 2022    Lymphedema of extremity 2021    Other specified hypothyroidism 10/03/2020    [facility] episode of [facility] depressive disorder [facility] ) 10/03/2020    Idiopathic chronic 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 10/03/2020    Primary osteoarthritis involving multiple joints 10/03/2020    Chronic diastolic congestive heart failure ([facility] ) 10/03/2020    Panniculitis 10/03/2020    Gastroesophageal reflux disease without esophagitis 10/03/2020    Hx MRSA infection 2020    Functional gait abnormality 2019    Impaired mobility 2019    Osteoarthritis of glenohumeral joint 2019    Left ovarian cyst 2017    Depression with anxiety 07/15/2017    Anemia 2016    Ambulatory dysfunction 2016    Acute kidney injury superimposed on CKD ([facility] ) 2016    Adjustment disorder 2013    Irritable bowel syndrome 2012    Left atrial enlargement 2012    Left ventricular hypertrophy 2012    Carpal tunnel syndrome 2012    Gout 2012    Hyperlipidemia 2012    Symptomatic menopausal or female climacteric states 2012      LOS (days): 6  Geometric Mean LOS (GMLOS) (days): 3 20  Days to GMLOS:-2 7     OBJECTIVE:  Risk of Unplanned Readmission Score: 42 72         Current admission status: Inpatient   Preferred Pharmacy:    [facility] Jamal Diamond [address] [place] [address]  Phone: [phone] Fax: [phone] [address] [place], [place] - [address]  Phone: [phone] Fax: [phone]    Primary Care Provider: Dee Valencia MD    Primary Insurance: MEDICARE  Secondary Insurance: AARP    DISCHARGE DETAILS:    Discharge planning discussed with[de-identified] Patient  Freedom of Choice: Yes  Comments - Freedom of Choice: CM informed this AM that Medicare [place] appeal decision was made yesterday 9/28  Rosemary Tucker agrees pt admission should be terminated today 9/29  Pt also aware and agrees to be dischagred  She remains in agreement with going to [facility] today  [facility] to accept pt today  Pt aware and in agreement  Marina from [facility] aware and will discharge pt  Pt nurse [facility] aware and will call report prior to discharge  Transport scheduled for 1700 with [employer]  Ashley at [facility] aware and in agreement  Would you like to participate in our [address] service program?  : No - Declined    Treatment Team Recommendation: Short Term Rehab  Discharge Destination Plan[de-identified] Short Term Rehab  Transport at Discharge : [facility] Ambulance        Transported by [place] and Unit #):  [employer]  ETA of Transport (Date): 09/29/22  ETA of Transport (Time): [address] [place] [address], [address] : [address]  Receiving Facility/Agency Phone Number: [phone]  Facility/Agency Fax Number: [phone]

## 2022-09-29 NOTE — ASSESSMENT & PLAN NOTE
Wt Readings from Last 3 Encounters:   09/22/22 (!) 206 kg (454 lb 2 4 oz)   07/25/22 (!) 201 kg (442 lb 7 4 oz)   06/14/22 (!) 211 kg (464 lb 8 2 oz)     · Clinically without exacerbation  · Lasix was held due to LARRY, may resume on d/c  · Continue Coumadin for anticoagulation

## 2022-10-17 ENCOUNTER — TELEPHONE (OUTPATIENT)
Dept: GASTROENTEROLOGY | Facility: CLINIC | Age: 72
End: 2022-10-17

## 2022-11-14 ENCOUNTER — OFFICE VISIT (OUTPATIENT)
Dept: NEPHROLOGY | Facility: CLINIC | Age: 72
End: 2022-11-14

## 2022-11-14 VITALS
WEIGHT: 293 LBS | BODY MASS INDEX: 51.91 KG/M2 | HEIGHT: 63 IN | HEART RATE: 59 BPM | OXYGEN SATURATION: 98 % | DIASTOLIC BLOOD PRESSURE: 62 MMHG | SYSTOLIC BLOOD PRESSURE: 114 MMHG

## 2022-11-14 DIAGNOSIS — I50.32 CHRONIC DIASTOLIC CONGESTIVE HEART FAILURE (HCC): ICD-10-CM

## 2022-11-14 DIAGNOSIS — N26.1 LEFT RENAL ATROPHY: ICD-10-CM

## 2022-11-14 DIAGNOSIS — I89.0 LYMPHEDEMA OF EXTREMITY: ICD-10-CM

## 2022-11-14 DIAGNOSIS — N18.31 STAGE 3A CHRONIC KIDNEY DISEASE (HCC): Primary | ICD-10-CM

## 2022-11-14 NOTE — PROGRESS NOTES
Assessment & Plan:    1  Stage 3a chronic kidney disease (HCC)  -     Comprehensive metabolic panel; Future; Expected date: 01/25/2023  -     Magnesium; Future; Expected date: 01/25/2023  -     Microalbumin / creatinine urine ratio; Future; Expected date: 01/25/2023  -     Phosphorus; Future; Expected date: 01/25/2023  -     PTH, intact; Future; Expected date: 01/25/2023  -     Uric acid; Future; Expected date: 01/25/2023  -     Urinalysis with microscopic; Future; Expected date: 01/25/2023  -     Protein electrophoresis, urine; Future; Expected date: 01/25/2023  -     Protein electrophoresis, serum; Future; Expected date: 01/25/2023    2  BMI 70 and over, adult ([facility] [address] )    3  Chronic diastolic congestive heart failure ([facility] [address] )    4  Lymphedema of extremity    5  Left renal atrophy       1   CKD2/3a  Baseline Cr 1 0-1 1 most recent Cr 1 08 with eGFR 60WX/HKQ  Her metabolics were stable     Etiology of CKD potentially 2/2 HTN, atherosclerosis, CHF, obesity related, hx severe LARRY  Moderate atrophy on left kidney from prior Ct 5/6/22  Volume status limited in body habitus and exam on stretcher  She reports stable weight which is again limited and stable LE edema  UOP nonoliguric by hx  Recommend   1  Reviewed CKD stages and Cr trends  Cr stable on most recent labs from 11/7  She wishes to continue to follow with nephrology as Dr Amber Small is not managing her kidneys  Recommend follow up every 4 months, may be able to stretch out frequency to q 6 months depending on trends  Creatinine may not be accurate assessment of renal function with morbid obesity and who is elderly  Cystatin C can be considered for a better understanding of a baseline  2  Check spep/upep and quantify proteinuria for CKD eval  Check PTH as well  Previous protein quantification not available that I can see  3  UA bland from July 2022, negative blood,protein, will repeat  2  BMI over 70  Encourage weight loss       3  Chronic lymphedema  Lymphedema clinic  Low sodium diet   Loop diuretic are not the focus of management in lymphedema  4  Chronic diastolic CHF, previous weight 464 lb, if weight accurate from yesterday 426 lb  Low sodium diet  Continue daily lasix   cardiology fu  Difficult to perform volume assessment    5  Left kidney moderate atrophy   This was stable  Vascular US may be logistically difficult given body habitus  Her BP is under very good control  Could entertain possibility of evaluation in the future but would hold at this juncture  6  Hypoalbuminemia,  Requires dietary support with protein  will defer to primary to help manage  The benefits, risks and alternatives to the treatment plan were discussed at this visit  Patient was advised of common adverse effects of any medical therapies prescribed  All questions were answered and discussed with the patient and any accompanying family members or caretakers  Subjective:      Patient ID: Santiago Mcnally is a 71 y o  female  presenting in follow up for LARRY on CKD in the [place] office  Last seen in the nephrology office on 7/18/22 by Marilyn Jovel  Her Creatinine had improved to 1 58 on 6/30 with eGFR 35 ml/min compared to 2 5 with eGFR 18ml/min on 6/14  Patient is brought in by EMS transport today  HPI    She follows with Dr Chelsea Colón for her PCP, he is a nephrologist but he does not manage her kidney function  She follows with AP from his office who comes to her house  Most recent labs show Cr 1 0 with eGFR 55ML/MIN  Albumin quite low at 2 3  Creatinine has been running in 1 1-1 2 range in September  She was hospitalized in September from 9/22-9/27 for left hip/thigh/pannus cellulitis  Treated with IV levaquin and converted to cefuroxime per ID pharmacist  She was discharged to [facility] on 9/29/22  She has been continuing with PT/OT once a week at home  She takes lasix 40mg/day  If SBP < 110 Does not take  /62  Denies LUTS  Denies NSAID use ,takes tylenol as needed  Has appt with plastic surgeon in December for lymphedema/abd edema  Drinking 96 ounces of fluid per day  The following portions of the patient's history were reviewed and updated as appropriate: allergies, current medications, past family history, past medical history, past social history, past surgical history, and problem list     Review of Systems   Constitutional: Negative  Eyes: Positive for visual disturbance  Respiratory: Negative  Cardiovascular: Negative  Gastrointestinal: Negative  Genitourinary: Negative  Neurological: Negative  All other systems reviewed and are negative  Objective:      /62 (BP Location: Left arm, Patient Position: Supine, Cuff Size: Standard)   Pulse 59   Ht 5' 3" (1 6 m)   Wt (!) 193 kg (426 lb) Comment: stretcher patient  LMP  (LMP Unknown)   SpO2 98%   BMI 75 46 kg/m²          Physical Exam  Vitals and nursing note reviewed  Constitutional:       Appearance: She is obese  HENT:      Head: Atraumatic  Nose: Nose normal    Cardiovascular:      Rate and Rhythm: Normal rate and regular rhythm  Pulmonary:      Breath sounds: No wheezing, rhonchi or rales  Comments: Decrease BS antiorly   Abdominal:      General: Abdomen is flat  Bowel sounds are normal  There is no distension  Palpations: Abdomen is soft  Tenderness: There is no abdominal tenderness  There is no guarding  Musculoskeletal:      Right lower leg: Edema present  Left lower leg: Edema present  Comments: +1 bl le edema  Chronic lymphedema    Skin:     General: Skin is warm  Neurological:      Mental Status: She is alert and oriented to person, place, and time  Mental status is at baseline        Comments: Limited exam on stretcher    Psychiatric:         Mood and Affect: Mood normal          Behavior: Behavior normal              Lab Results   Component Value Date     05/19/2018    SODIUM 137 09/29/2022    K 4 1 09/29/2022     09/29/2022    CO2 29 09/29/2022    ANIONGAP 12 5 05/19/2018    AGAP 6 09/29/2022    BUN 27 (H) 09/29/2022    CREATININE 1 19 09/29/2022    GLUC 135 09/29/2022    GLUF 111 (H) 04/19/2022    CALCIUM 8 9 09/29/2022    AST 12 (L) 09/26/2022    ALT 6 (L) 09/26/2022    ALKPHOS 91 09/26/2022    PROT 7 5 05/19/2018    TP 6 8 09/26/2022    BILITOT 0 8 05/19/2018    TBILI 0 59 09/26/2022    EGFR 46 09/29/2022      Lab Results   Component Value Date    CREATININE 1 19 09/29/2022    CREATININE 1 29 09/28/2022    CREATININE 1 26 09/27/2022    CREATININE 1 39 (H) 09/26/2022    CREATININE 1 44 (H) 09/25/2022    CREATININE 1 43 (H) 09/24/2022    CREATININE 1 11 09/22/2022    CREATININE 1 17 08/01/2022    CREATININE 1 24 07/29/2022    CREATININE 1 32 (H) 07/28/2022    CREATININE 1 29 07/27/2022    CREATININE 1 08 07/26/2022    CREATININE 1 20 07/25/2022    CREATININE 2 56 (H) 06/14/2022    CREATININE 2 37 (H) 06/13/2022      Lab Results   Component Value Date    COLORU Yellow 07/26/2022    CLARITYU Clear 07/26/2022    SPECGRAV 1 020 07/26/2022    PHUR 5 0 07/26/2022    LEUKOCYTESUR Negative 07/26/2022    NITRITE Negative 07/26/2022    PROTEIN UA Negative 07/26/2022    GLUCOSEU Negative 07/26/2022    KETONESU Negative 07/26/2022    UROBILINOGEN 0 2 07/26/2022    BILIRUBINUR Negative 07/26/2022    BLOODU Negative 07/26/2022    RBCUA 1-2 (A) 06/09/2022    WBCUA 0-1 (A) 06/09/2022    EPIS Moderate (A) 06/09/2022    BACTERIA Moderate (A) 06/09/2022      No results found for: LABPROT  No results found for: [place]  Lab Results   Component Value Date    WBC 10 63 (H) 09/29/2022    HGB 10 3 (L) 09/29/2022    HCT 34 5 (L) 09/29/2022    MCV 85 09/29/2022     09/29/2022      Lab Results   Component Value Date    HGB 10 3 (L) 09/29/2022    HGB 10 4 (L) 09/28/2022    HGB 10 3 (L) 09/27/2022    HGB 10 3 (L) 09/26/2022    HGB 9 9 (L) 09/25/2022      No results found for: IRON, TIBC, FERRITIN   No results found for: PTHCALCIUM, [id number], PHOSPHORUS   No results found for: CHOLESTEROL, HDL, LDLCALC, TRIG   No results found for: URICACID   Lab Results   Component Value Date    HGBA1C 7 1 (H) 08/30/2021      No results found for: TSHANTIBODY, E8JWMDO, FREET4   No results found for: CARLO, DSDNAAB, RFIGM   Lab Results   Component Value Date    PROT 7 5 05/19/2018        Portions of the record may have been created with voice recognition software  Occasional wrong word or "sound a like" substitutions may have occurred due to the inherent limitations of voice recognition software  Read the chart carefully and recognize, using context, where substitutions have occurred  If you have any questions, please contact the dictating provider

## 2022-11-14 NOTE — PATIENT INSTRUCTIONS
Check blood and urine tests in 3-4 months  Creatinine down to 1 0 and eGFR 55%  Follow up in 4 months  No changes to your medications

## 2022-12-14 ENCOUNTER — OFFICE VISIT (OUTPATIENT)
Dept: PLASTIC SURGERY | Facility: CLINIC | Age: 72
End: 2022-12-14

## 2022-12-14 VITALS
BODY MASS INDEX: 75.46 KG/M2 | HEIGHT: 63 IN | TEMPERATURE: 98.2 F | HEART RATE: 72 BPM | OXYGEN SATURATION: 99 % | RESPIRATION RATE: 18 BRPM

## 2022-12-14 DIAGNOSIS — I89.0 LYMPHEDEMA: Primary | ICD-10-CM

## 2022-12-15 NOTE — PROGRESS NOTES
Patient is a 71-year-old female who is here today for evaluation of lateral thigh masses  The patient is extremely morbidly obese with a BMI of 75, the patient has multiple medical problems, including congestive heart failure, diabetes, atrial fibrillation, she is currently on anticoagulation  The patient states that she has had lateral thigh masses for some time, they will chronically weep a serous fluid, they are not currently associated with any redness, swelling, drainage, although she states that she has been frequently hospitalized for recurrent bouts of cellulitis  She currently has no fever, chills, nausea, vomiting, no redness, swelling, drainage other than the normal serous drainage  The patient is in a stretcher, she states that she is nonambulatory  Physical exam--patient is extremely morbidly obese, she has 2 large areas of lateral thigh lymphedema there is no evidence of acute infection  Discussion--I had a lengthy discussion with the patient  The patient has chronic and severe lymphedema of the bilateral thighs, this is related to her extreme morbid obesity and her pannus laying on her thigh leading to likely venous insufficiency and decreased lymphatic return  I discussed with the patient she is not a candidate for surgery  I discussed with patient that removal of these areas will have a severe and significant risk of complication, I discussed with the patient that she will likely have a complication rate almost assuredly 100%, namely a significant wound healing complication and 335% recurrence of her lymphedema  I discussed with the patient that the benefits do not outweigh the risks, the significant risk of wound healing complication would likely be worse than the current condition which she is in and her lymphedema would almost assuredly promptly return    I discussed with the patient my recommendation would be for bariatric surgery referral   I discussed with her that at this point her morbid obesity is an acute threat to her health and life long-term  I discussed with her that I believe that bariatric surgery would be in the patient's best interest, however I would defer to bariatric surgery to determine whether she is even a candidate at this point  I discussed with her that I would be happy to assist her with skin removal surgery should she be considered a candidate and undergo significant and massive weight loss, I discussed with her that this course of action in my opinion offers the only chance at significant improvement in her overall status  Patient understands and agrees, I will put a referral for bariatric surgery, and the patient can follow-up with and when she achieves significant weight loss  Counseling dominated visit, total counseling time 35 minutes, total visit time 45 minutes including documentation, review of her chart and consultation time with the patient

## 2023-01-07 ENCOUNTER — HOSPITAL ENCOUNTER (EMERGENCY)
Facility: HOSPITAL | Age: 73
Discharge: HOME | End: 2023-01-08
Attending: EMERGENCY MEDICINE

## 2023-01-07 ENCOUNTER — APPOINTMENT (EMERGENCY)
Dept: RADIOLOGY | Facility: HOSPITAL | Age: 73
End: 2023-01-07

## 2023-01-07 VITALS
BODY MASS INDEX: 51.91 KG/M2 | OXYGEN SATURATION: 98 % | HEIGHT: 63 IN | HEART RATE: 66 BPM | TEMPERATURE: 96.7 F | RESPIRATION RATE: 20 BRPM | DIASTOLIC BLOOD PRESSURE: 68 MMHG | SYSTOLIC BLOOD PRESSURE: 132 MMHG | WEIGHT: 293 LBS

## 2023-01-07 DIAGNOSIS — R07.89 ATYPICAL CHEST PAIN: Primary | ICD-10-CM

## 2023-01-07 DIAGNOSIS — R79.1 SUBTHERAPEUTIC INTERNATIONAL NORMALIZED RATIO (INR): ICD-10-CM

## 2023-01-07 LAB
ALBUMIN SERPL BCP-MCNC: 2.5 G/DL (ref 3.5–5)
ALP SERPL-CCNC: 86 U/L (ref 34–104)
ALT SERPL W P-5'-P-CCNC: 7 U/L (ref 7–52)
ANION GAP SERPL CALCULATED.3IONS-SCNC: 6 MMOL/L (ref 4–13)
APTT PPP: 39 SECONDS (ref 23–37)
AST SERPL W P-5'-P-CCNC: 12 U/L (ref 13–39)
BASOPHILS # BLD AUTO: 0.05 THOUSANDS/ÂΜL (ref 0–0.1)
BASOPHILS NFR BLD AUTO: 1 % (ref 0–1)
BILIRUB SERPL-MCNC: 0.47 MG/DL (ref 0.2–1)
BUN SERPL-MCNC: 19 MG/DL (ref 5–25)
CALCIUM ALBUM COR SERPL-MCNC: 8.7 MG/DL (ref 8.3–10.1)
CALCIUM SERPL-MCNC: 7.5 MG/DL (ref 8.4–10.2)
CARDIAC TROPONIN I PNL SERPL HS: 6 NG/L
CHLORIDE SERPL-SCNC: 101 MMOL/L (ref 96–108)
CO2 SERPL-SCNC: 28 MMOL/L (ref 21–32)
CREAT SERPL-MCNC: 0.83 MG/DL (ref 0.6–1.3)
EOSINOPHIL # BLD AUTO: 0.19 THOUSAND/ÂΜL (ref 0–0.61)
EOSINOPHIL NFR BLD AUTO: 2 % (ref 0–6)
ERYTHROCYTE [DISTWIDTH] IN BLOOD BY AUTOMATED COUNT: 17.9 % (ref 11.6–15.1)
GFR SERPL CREATININE-BSD FRML MDRD: 71 ML/MIN/1.73SQ M
GLUCOSE SERPL-MCNC: 118 MG/DL (ref 65–140)
HCT VFR BLD AUTO: 35.8 % (ref 34.8–46.1)
HGB BLD-MCNC: 10.6 G/DL (ref 11.5–15.4)
IMM GRANULOCYTES # BLD AUTO: 0.05 THOUSAND/UL (ref 0–0.2)
IMM GRANULOCYTES NFR BLD AUTO: 1 % (ref 0–2)
INR PPP: 1.71 (ref 0.84–1.19)
LIPASE SERPL-CCNC: 31 U/L (ref 11–82)
LYMPHOCYTES # BLD AUTO: 1.71 THOUSANDS/ÂΜL (ref 0.6–4.47)
LYMPHOCYTES NFR BLD AUTO: 19 % (ref 14–44)
MCH RBC QN AUTO: 24.3 PG (ref 26.8–34.3)
MCHC RBC AUTO-ENTMCNC: 29.6 G/DL (ref 31.4–37.4)
MCV RBC AUTO: 82 FL (ref 82–98)
MONOCYTES # BLD AUTO: 0.55 THOUSAND/ÂΜL (ref 0.17–1.22)
MONOCYTES NFR BLD AUTO: 6 % (ref 4–12)
NEUTROPHILS # BLD AUTO: 6.33 THOUSANDS/ÂΜL (ref 1.85–7.62)
NEUTS SEG NFR BLD AUTO: 71 % (ref 43–75)
NRBC BLD AUTO-RTO: 0 /100 WBCS
PLATELET # BLD AUTO: 377 THOUSANDS/UL (ref 149–390)
PMV BLD AUTO: 7.8 FL (ref 8.9–12.7)
POTASSIUM SERPL-SCNC: 3 MMOL/L (ref 3.5–5.3)
PROT SERPL-MCNC: 6.3 G/DL (ref 6.4–8.4)
PROTHROMBIN TIME: 20 SECONDS (ref 11.6–14.5)
RBC # BLD AUTO: 4.36 MILLION/UL (ref 3.81–5.12)
SODIUM SERPL-SCNC: 135 MMOL/L (ref 135–147)
WBC # BLD AUTO: 8.88 THOUSAND/UL (ref 4.31–10.16)

## 2023-01-07 RX ORDER — VALACYCLOVIR HYDROCHLORIDE 500 MG/1
500 TABLET, FILM COATED ORAL 2 TIMES DAILY
COMMUNITY

## 2023-01-07 RX ORDER — MAGNESIUM HYDROXIDE/ALUMINUM HYDROXICE/SIMETHICONE 120; 1200; 1200 MG/30ML; MG/30ML; MG/30ML
30 SUSPENSION ORAL ONCE
Status: COMPLETED | OUTPATIENT
Start: 2023-01-07 | End: 2023-01-07

## 2023-01-07 RX ORDER — POTASSIUM CHLORIDE 20 MEQ/1
40 TABLET, EXTENDED RELEASE ORAL ONCE
Status: COMPLETED | OUTPATIENT
Start: 2023-01-07 | End: 2023-01-07

## 2023-01-07 RX ORDER — LIDOCAINE HYDROCHLORIDE 20 MG/ML
15 SOLUTION OROPHARYNGEAL ONCE
Status: COMPLETED | OUTPATIENT
Start: 2023-01-07 | End: 2023-01-07

## 2023-01-07 RX ADMIN — ALUMINUM HYDROXIDE, MAGNESIUM HYDROXIDE, AND SIMETHICONE 30 ML: 200; 200; 20 SUSPENSION ORAL at 19:11

## 2023-01-07 RX ADMIN — LIDOCAINE HYDROCHLORIDE 15 ML: 20 SOLUTION OROPHARYNGEAL at 19:14

## 2023-01-07 RX ADMIN — POTASSIUM CHLORIDE 40 MEQ: 1500 TABLET, EXTENDED RELEASE ORAL at 20:00

## 2023-01-08 LAB
ATRIAL RATE: 144 BPM
QRS AXIS: 5 DEGREES
QRSD INTERVAL: 72 MS
QT INTERVAL: 394 MS
QTC INTERVAL: 399 MS
T WAVE AXIS: 35 DEGREES
VENTRICULAR RATE: 62 BPM

## 2023-01-08 RX ORDER — ACETAMINOPHEN 325 MG/1
650 TABLET ORAL ONCE
Status: DISCONTINUED | OUTPATIENT
Start: 2023-01-08 | End: 2023-01-08 | Stop reason: HOSPADM

## 2023-01-08 NOTE — ED PROVIDER NOTES
History  Chief Complaint   Patient presents with   • Chest Pain     67-year-old female presents emergency department complaining of chest discomfort  Patient notes that this afternoon she had eaten and she got symptoms that felt like her reflux  Patient notes that she drank some ginger ale because she knows that normally helps her symptoms but this time it did not  The patient denies diaphoresis or shortness of breath  The patient does have a history of shingles and is currently on Valtrex  Patient notes her pain started around 2 in the afternoon  Patient denies shortness of breath or diaphoresis or radiation of pain          Prior to Admission Medications   Prescriptions Last Dose Informant Patient Reported? Taking? DULoxetine (CYMBALTA) 20 mg capsule   No No   Sig: Take 1 capsule (20 mg total) by mouth daily   Neomycin-Bacitracin-Polymyxin (HCA TRIPLE ANTIBIOTIC OINTMENT EX)   Yes No   Sig: Apply topically To abdominal fold twice per day     Probiotic Product (PROBIOTIC DAILY PO)   Yes No   Sig: Take 1 each by mouth in the morning   Patient not taking: Reported on 12/14/2022   acetaminophen (TYLENOL) 650 mg CR tablet   Yes No   Sig: Take 650 mg by mouth every 8 (eight) hours as needed (for osteoarthritis)   allopurinol (ZYLOPRIM) 100 mg tablet   No No   Sig: Take 1 tablet (100 mg total) by mouth daily   furosemide (LASIX) 40 mg tablet   Yes No   Sig: Take 40 mg by mouth daily Hold for SBP < 100   levothyroxine 125 mcg tablet   Yes No   Sig: Take 125 mcg by mouth daily   oxyCODONE (ROXICODONE) 5 immediate release tablet   No No   Sig: Take 1 tablet (5 mg total) by mouth every 6 (six) hours as needed for moderate pain or severe pain for up to 5 doses Max Daily Amount: 20 mg   Patient not taking: Reported on 12/14/2022   polyethylene glycol (MIRALAX) 17 g packet   No No   Sig: Take 17 g by mouth daily   Patient not taking: Reported on 12/14/2022   senna-docusate sodium (SENOKOT S) 8 6-50 mg per tablet   No No Sig: Take 1 tablet by mouth daily at bedtime   Patient not taking: Reported on 12/14/2022   triamcinolone (KENALOG) 0 1 % cream   Yes No   Sig: Apply 0 1 application topically 2 (two) times a day   valACYclovir (VALTREX) 500 mg tablet   Yes Yes   Sig: Take 500 mg by mouth 2 (two) times a day   warfarin (COUMADIN) 2 mg tablet   No No   Sig: Take 1 tablet (2 mg total) by mouth every other day Monday, Wednesday, Friday, sunday   warfarin (COUMADIN) 4 mg tablet   Yes No   Sig: Take 4 mg by mouth daily Take 1 tablet by mouth every Tuesday, Thursday, and Saturday   Patient not taking: Reported on 12/14/2022      Facility-Administered Medications: None       Past Medical History:   Diagnosis Date   • Atrial fibrillation ([facility] )    • Bladder stone    • C  difficile colitis    • Cellulitis    • CHF (congestive heart failure) ([facility] )    • Depression with anxiety    • Disease of thyroid gland    • Diverticulitis    • Enterocolitis    • History of abnormal cervical Pap smear    • Kidney stone    • Lymphedema    • Menopause     Age 53   • Morbid obesity with BMI of 70 and over, adult ([facility] )    • MRSA (methicillin resistant Staphylococcus aureus)     abdominal wound   • Osteoarthritis    • Phlebitis     left lower leg   • Spondylolysis        Past Surgical History:   Procedure Laterality Date   • APPENDECTOMY     • CARPAL TUNNEL RELEASE     • CHOLECYSTECTOMY     • CYSTOSCOPY      stent   • FOOT SURGERY  1982    bone spur   • KNEE SURGERY     • WISDOM TOOTH EXTRACTION         Family History   Problem Relation Age of Onset   • Heart failure Mother    • Heart disease Mother    • Lymphoma Mother    • Seizures Mother    • Kidney disease Father    • Heart disease Father    • Heart failure Father    • Diabetes type II Father    • Diabetes type II Sister    • Diabetes type II Brother    • Uterine cancer Paternal Aunt    • Breast cancer Neg Hx    • Colon cancer Neg Hx    • Ovarian cancer Neg Hx      I have reviewed and agree with the history as documented  E-Cigarette/Vaping   • E-Cigarette Use Never User      E-Cigarette/Vaping Substances   • Nicotine No    • THC No    • CBD No    • Flavoring No    • Other No    • Unknown No      Social History     Tobacco Use   • Smoking status: Former     Packs/day: 5 00     Years: 3 00     Pack years: 15 00     Types: Cigarettes     Start date: 1967     Quit date: 1970     Years since quittin 5   • Smokeless tobacco: Never   • Tobacco comments:     5 packs/day until 5 (age [age]) - As per [employer]    Vaping Use   • Vaping Use: Never used   Substance Use Topics   • Alcohol use: Not Currently   • Drug use: Not Currently     Types: Marijuana     Comment: As a teen - As per [employer]        Review of Systems    Physical Exam  Physical Exam  Vitals and nursing note reviewed  Constitutional:       General: She is not in acute distress  Appearance: Normal appearance  She is well-developed  HENT:      Head: Normocephalic and atraumatic  Right Ear: External ear normal       Left Ear: External ear normal       Nose: Nose normal       Mouth/Throat:      Mouth: Mucous membranes are moist    Eyes:      Conjunctiva/sclera: Conjunctivae normal    Cardiovascular:      Rate and Rhythm: Normal rate and regular rhythm  Pulses: Normal pulses  Heart sounds: Normal heart sounds  No murmur heard  Pulmonary:      Effort: Pulmonary effort is normal  No respiratory distress  Breath sounds: Normal breath sounds  No decreased breath sounds  Chest:      Chest wall: Tenderness present  Comments: Chest tenderness palpable at the xiphoid region  Patient notes that this is the same tenderness she has been complaining of  Abdominal:      General: Bowel sounds are normal       Palpations: Abdomen is soft  There is no mass  Tenderness: There is no abdominal tenderness  Musculoskeletal:         General: No swelling or deformity  Cervical back: Neck supple        Right lower leg: No edema  Left lower leg: Edema present  Skin:     General: Skin is warm and dry  Capillary Refill: Capillary refill takes less than 2 seconds  Coloration: Skin is pale  Neurological:      General: No focal deficit present  Mental Status: She is alert and oriented to person, place, and time  Mental status is at baseline           Vital Signs  ED Triage Vitals [01/07/23 1850]   Temperature Pulse Respirations Blood Pressure SpO2   (!) 96 7 °F (35 9 °C) 66 20 132/68 98 %      Temp Source Heart Rate Source Patient Position - Orthostatic VS BP Location FiO2 (%)   Tympanic Monitor Sitting Left arm --      Pain Score       6           Vitals:    01/07/23 1850   BP: 132/68   Pulse: 66   Patient Position - Orthostatic VS: Sitting         Visual Acuity      ED Medications  Medications   aluminum-magnesium hydroxide-simethicone (MYLANTA) oral suspension 30 mL (30 mL Oral Given 1/7/23 1911)   Lidocaine Viscous HCl (XYLOCAINE) 2 % mucosal solution 15 mL (15 mL Swish & Spit Given 1/7/23 1914)   potassium chloride (K-DUR,KLOR-CON) CR tablet 40 mEq (40 mEq Oral Given 1/7/23 2000)       Diagnostic Studies  Results Reviewed     Procedure Component Value Units Date/Time    HS Troponin 0hr (reflex protocol) [[id number]]  (Normal) Collected: 01/07/23 1908    Lab Status: Final result Specimen: Blood from Arm, Right Updated: 01/07/23 1945     hs TnI 0hr 6 ng/L     Comprehensive metabolic panel [[id number]]  (Abnormal) Collected: 01/07/23 1908    Lab Status: Final result Specimen: Blood from Arm, Right Updated: 01/07/23 1937     Sodium 135 mmol/L      Potassium 3 0 mmol/L      Chloride 101 mmol/L      CO2 28 mmol/L      ANION GAP 6 mmol/L      BUN 19 mg/dL      Creatinine 0 83 mg/dL      Glucose 118 mg/dL      Calcium 7 5 mg/dL      Corrected Calcium 8 7 mg/dL      AST 12 U/L      ALT 7 U/L      Alkaline Phosphatase 86 U/L      Total Protein 6 3 g/dL      Albumin 2 5 g/dL      Total Bilirubin 0 47 mg/dL      eGFR 71 ml/min/1 73sq m     Narrative:      National Kidney Disease Foundation guidelines for Chronic Kidney Disease (CKD):   •  Stage 1 with normal or high GFR (GFR > 90 mL/min/1 73 square meters)  •  Stage 2 Mild CKD (GFR = 60-89 mL/min/1 73 square meters)  •  Stage 3A Moderate CKD (GFR = 45-59 mL/min/1 73 square meters)  •  Stage 3B Moderate CKD (GFR = 30-44 mL/min/1 73 square meters)  •  Stage 4 Severe CKD (GFR = 15-29 mL/min/1 73 square meters)  •  Stage 5 End Stage CKD (GFR <15 mL/min/1 73 square meters)  Note: GFR calculation is accurate only with a steady state creatinine    Lipase [[id number]]  (Normal) Collected: 01/07/23 1908    Lab Status: Final result Specimen: Blood from Arm, Right Updated: 01/07/23 1937     Lipase 31 u/L     Protime-INR [[id number]]  (Abnormal) Collected: 01/07/23 1908    Lab Status: Final result Specimen: Blood from Arm, Right Updated: 01/07/23 1934     Protime 20 0 seconds      INR 1 71    APTT [[id number]]  (Abnormal) Collected: 01/07/23 1908    Lab Status: Final result Specimen: Blood from Arm, Right Updated: 01/07/23 1934     PTT 39 seconds     CBC and differential [[id number]]  (Abnormal) Collected: 01/07/23 1908    Lab Status: Final result Specimen: Blood from Arm, Right Updated: 01/07/23 1912     WBC 8 88 Thousand/uL      RBC 4 36 Million/uL      Hemoglobin 10 6 g/dL      Hematocrit 35 8 %      MCV 82 fL      MCH 24 3 pg      MCHC 29 6 g/dL      RDW 17 9 %      MPV 7 8 fL      Platelets 690 Thousands/uL      nRBC 0 /100 WBCs      Neutrophils Relative 71 %      Immat GRANS % 1 %      Lymphocytes Relative 19 %      Monocytes Relative 6 %      Eosinophils Relative 2 %      Basophils Relative 1 %      Neutrophils Absolute 6 33 Thousands/µL      Immature Grans Absolute 0 05 Thousand/uL      Lymphocytes Absolute 1 71 Thousands/µL      Monocytes Absolute 0 55 Thousand/µL      Eosinophils Absolute 0 19 Thousand/µL      Basophils Absolute 0 05 Thousands/µL                  XR chest 1 view portable ED Interpretation by Isaiah Payton DO (01/07 2037)   Cardiomegaly noted without acute pulmonary pathology  Final Result by Layo Fishman MD (01/08 8534)      No acute cardiopulmonary disease  Workstation performed: [id number]                    Procedures  ECG 12 Lead Documentation Only    Date/Time: 1/7/2023 7:16 PM  Performed by: Iasiah Payton DO  Authorized by: Isaiah Payton DO     ECG reviewed by me, the ED Provider: yes    Patient location:  ED  Comments:      EKG shows atrial fibrillation with a rate of 62  There is normal axis  There is nonspecific anterior lateral T wave flattening  ED Course  ED Course as of 01/09/23 0114   Sat Jan 07, 2023   1942 POCT INR(!): 1 71  Subtherapeutic                               SBIRT 20yo+    Flowsheet Row Most Recent Value   SBIRT (23 yo +)    In order to provide better care to our patients, we are screening all of our patients for alcohol and drug use  Would it be okay to ask you these screening questions? Yes Filed at: 01/07/2023 1858   Initial Alcohol Screen: US AUDIT-C     1  How often do you have a drink containing alcohol? 0 Filed at: 01/07/2023 1858   2  How many drinks containing alcohol do you have on a typical day you are drinking? 0 Filed at: 01/07/2023 1858   3a  Male UNDER 65: How often do you have five or more drinks on one occasion? 0 Filed at: 01/07/2023 1858   3b  FEMALE Any Age, or MALE 65+: How often do you have 4 or more drinks on one occassion? 0 Filed at: 01/07/2023 1858   Audit-C Score 0 Filed at: 01/07/2023 1858   JYOTI: How many times in the past year have you    Used an illegal drug or used a prescription medication for non-medical reasons? Never Filed at: 01/07/2023 1858                    Medical Decision Making  On evaluation, patient's chest pain is reproducible at the sternum and xiphoid  The patient does not appear to have actual abdominal pain in the epigastrium  The patient's pain is not pleuritic  Initial troponin is 6  The patient notes her pain started around 2 in the afternoon  She notes the pain to be greatly improved at this time  I discussed with her that it is protocol to repeat a second troponin which would be done in another hour and a half  The patient notes that she does not want a wait  The patient understands that the risk is low but is willing to take the risk because she needs an ambulance to get back home and does not want to be in the hospital overnight waiting  Patient will be discharged    Amount and/or Complexity of Data Reviewed  Labs: ordered  Decision-making details documented in ED Course  Radiology: ordered  Risk  OTC drugs  Prescription drug management  Disposition  Final diagnoses:   Atypical chest pain   Subtherapeutic international normalized ratio (INR)     Time reflects when diagnosis was documented in both MDM as applicable and the Disposition within this note     Time User Action Codes Description Comment    1/7/2023  8:35 PM Calli Hutchinson Add [R07 89] Atypical chest pain     1/7/2023  8:49 PM Ene Ohara Add [R79 1] Subtherapeutic international normalized ratio (INR)       ED Disposition     ED Disposition   Discharge    Condition   Stable    Date/Time   Sat Jan 7, 2023  8:35 PM    Comment   Terra Snellen discharge to home/self care                 Follow-up Information     Follow up With Specialties Details Why Contact Info    Sarah Yarbrough MD Nephrology, Internal Medicine On 1/13/2023  [address] [place] [address] [address]  [phone]            Discharge Medication List as of 1/7/2023  8:49 PM      CONTINUE these medications which have NOT CHANGED    Details   acetaminophen (TYLENOL) 650 mg CR tablet Take 650 mg by mouth every 8 (eight) hours as needed (for osteoarthritis), Historical Med      allopurinol (ZYLOPRIM) 100 mg tablet Take 1 tablet (100 mg total) by mouth daily, Starting Mon 9/30/2019, Normal      DULoxetine (CYMBALTA) 20 mg capsule Take 1 capsule (20 mg total) by mouth daily, Starting Wed 8/15/2018, Normal      furosemide (LASIX) 40 mg tablet Take 40 mg by mouth daily Hold for SBP < 100, Historical Med      levothyroxine 125 mcg tablet Take 125 mcg by mouth daily, Historical Med      Neomycin-Bacitracin-Polymyxin (HCA TRIPLE ANTIBIOTIC OINTMENT EX) Apply topically To abdominal fold twice per day , Historical Med      oxyCODONE (ROXICODONE) 5 immediate release tablet Take 1 tablet (5 mg total) by mouth every 6 (six) hours as needed for moderate pain or severe pain for up to 5 doses Max Daily Amount: 20 mg, Starting Tue 6/14/2022, Normal      polyethylene glycol (MIRALAX) 17 g packet Take 17 g by mouth daily, Starting Tue 9/27/2022, No Print      Probiotic Product (PROBIOTIC DAILY PO) Take 1 each by mouth in the morning, Historical Med      senna-docusate sodium (SENOKOT S) 8 6-50 mg per tablet Take 1 tablet by mouth daily at bedtime, Starting Tue 9/27/2022, No Print      triamcinolone (KENALOG) 0 1 % cream Apply 0 1 application topically 2 (two) times a day, Starting Thu 12/16/2021, Historical Med      valACYclovir (VALTREX) 500 mg tablet Take 500 mg by mouth 2 (two) times a day, Historical Med      !! warfarin (COUMADIN) 2 mg tablet Take 1 tablet (2 mg total) by mouth every other day Monday, Wednesday, Friday, sunday, Starting Tue 2/22/2022, No Print      !! warfarin (COUMADIN) 4 mg tablet Take 4 mg by mouth daily Take 1 tablet by mouth every Tuesday, Thursday, and Saturday, Historical Med       !! - Potential duplicate medications found  Please discuss with provider  No discharge procedures on file      PDMP Review       Value Time User    PDMP Reviewed  Yes 5/27/2022 11:10 PM Anjelica Dang PA-C          ED Provider  Electronically Signed by           Kemi Carnes DO  01/09/23 9143

## 2023-01-08 NOTE — DISCHARGE INSTRUCTIONS
Return to the ER for any new, concerning, or worsening issues  Please get your INR rechecked within a week  It was subtherapeutic today at 1 7

## 2023-02-17 ENCOUNTER — TELEPHONE (OUTPATIENT)
Dept: PLASTIC SURGERY | Facility: CLINIC | Age: 73
End: 2023-02-17

## 2023-02-17 NOTE — TELEPHONE ENCOUNTER
Patient returned call, jacqueline to call back with   Called patient and she asked that her last office note and referral be sent to her via mail

## 2023-02-17 NOTE — TELEPHONE ENCOUNTER
----- Message from Merlyn Sevilla sent at 2/17/2023 11:16 AM EST -----  Regarding: patient called about further instructions in regards to mass on stomach  Quentin Doan morning! This patient called today in regards to larissa ppt that she had with [facility] 12/14/22  She was seen for a mass on the right side of her stomach and I couldn't really make out what it was specifically that she was asking for  She said something about wanting further instructions to give to another doctor and she requested that those instructions be mailed to her home address  If you have some time today or whenever you get this, would you please be able to call her back just to confirm what it is that she needs? Thank you so much !

## 2023-02-27 NOTE — PROGRESS NOTES
Ordered both coumadin doses 
Outpatient Care Management Note: As per request of Eyad Lam, called pt to assist in her obtaining a way to get to PCP office for a visit  Pt is on Coumadin for years and has not had a recent physical exam  She was very talkative and pleasant, however firmly states that she has plans in place for multiple issues that she has  Regarding transportation, she has applied for [employer] through [employer] and [employer] and is awaiting her tickets for trips to arrive in the mail  She applied/paid a week ago and was told that process takes 2-3 weeks  When she gets them, she states that she will make her appointment to come into office  She is requesting refill on her Coumadin as she will be out of pills in a day or so  Her aides from [facility] are allowed to transport pt, but she is unable to get into their cars because of her mobility problems and the vehicles heights  She also is working on getting a wheelchair through [employer] because she cannot use the one she has due to her shoulder problems  Pt states that she has applied for renovation to her bathroom (to make it handicap accessible) a lift chair, and motorized scooter,all  through the waiver program "It's all a process"  Pt does have a ramp built in the front of her home which allows for easier access  Stressed importance of obtaining a scale for daily weights as this was mentioned in documentation from 7/2017 note with [facility] and she still has not gotten one, but has had a couple CHF exacerbations since  She is aware of weight gain rule as we had discussed s/s to watch for  Asked her to send her aide to local 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
Thanks for the assistance  Problem with cost in the past with the [facility]  Will have the [facility] staff send in two months of coumadin  [facility] STAFF, put in scripts for coumadin 
Neurology

## 2023-03-14 ENCOUNTER — TELEPHONE (OUTPATIENT)
Dept: NEPHROLOGY | Facility: CLINIC | Age: 73
End: 2023-03-14

## 2023-03-22 ENCOUNTER — HOSPITAL ENCOUNTER (INPATIENT)
Facility: HOSPITAL | Age: 73
LOS: 5 days | Discharge: HOME HEALTH | End: 2023-03-27
Attending: INTERNAL MEDICINE | Admitting: HOSPITALIST

## 2023-03-22 DIAGNOSIS — M79.3 ACUTE PANNICULITIS: ICD-10-CM

## 2023-03-22 DIAGNOSIS — Z79.01 CHRONIC ANTICOAGULATION: ICD-10-CM

## 2023-03-22 DIAGNOSIS — L03.90 CELLULITIS: Primary | ICD-10-CM

## 2023-03-22 PROBLEM — R79.89 ELEVATED LACTIC ACID LEVEL: Status: ACTIVE | Noted: 2023-03-22

## 2023-03-22 LAB
ALBUMIN SERPL BCP-MCNC: 3.2 G/DL (ref 3.5–5)
ALP SERPL-CCNC: 107 U/L (ref 34–104)
ALT SERPL W P-5'-P-CCNC: 9 U/L (ref 7–52)
ANION GAP SERPL CALCULATED.3IONS-SCNC: 11 MMOL/L (ref 4–13)
ANISOCYTOSIS BLD QL SMEAR: PRESENT
APTT PPP: 67 SECONDS (ref 23–37)
AST SERPL W P-5'-P-CCNC: 21 U/L (ref 13–39)
BASOPHILS # BLD MANUAL: 0 THOUSAND/UL (ref 0–0.1)
BASOPHILS NFR MAR MANUAL: 0 % (ref 0–1)
BILIRUB SERPL-MCNC: 0.75 MG/DL (ref 0.2–1)
BUN SERPL-MCNC: 18 MG/DL (ref 5–25)
CALCIUM ALBUM COR SERPL-MCNC: 9.5 MG/DL (ref 8.3–10.1)
CALCIUM SERPL-MCNC: 8.9 MG/DL (ref 8.4–10.2)
CHLORIDE SERPL-SCNC: 96 MMOL/L (ref 96–108)
CO2 SERPL-SCNC: 26 MMOL/L (ref 21–32)
CREAT SERPL-MCNC: 1.17 MG/DL (ref 0.6–1.3)
EOSINOPHIL # BLD MANUAL: 0 THOUSAND/UL (ref 0–0.4)
EOSINOPHIL NFR BLD MANUAL: 0 % (ref 0–6)
ERYTHROCYTE [DISTWIDTH] IN BLOOD BY AUTOMATED COUNT: 17.6 % (ref 11.6–15.1)
GFR SERPL CREATININE-BSD FRML MDRD: 46 ML/MIN/1.73SQ M
GLUCOSE SERPL-MCNC: 159 MG/DL (ref 65–140)
HCT VFR BLD AUTO: 43.3 % (ref 34.8–46.1)
HGB BLD-MCNC: 12.5 G/DL (ref 11.5–15.4)
INR PPP: 3.55 (ref 0.84–1.19)
LACTATE SERPL-SCNC: 1.6 MMOL/L (ref 0.5–2)
LACTATE SERPL-SCNC: 3 MMOL/L (ref 0.5–2)
LYMPHOCYTES # BLD AUTO: 0.84 THOUSAND/UL (ref 0.6–4.47)
LYMPHOCYTES # BLD AUTO: 4 % (ref 14–44)
MCH RBC QN AUTO: 24.1 PG (ref 26.8–34.3)
MCHC RBC AUTO-ENTMCNC: 28.9 G/DL (ref 31.4–37.4)
MCV RBC AUTO: 84 FL (ref 82–98)
MONOCYTES # BLD AUTO: 0 THOUSAND/UL (ref 0–1.22)
MONOCYTES NFR BLD: 0 % (ref 4–12)
NEUTROPHILS # BLD MANUAL: 20.16 THOUSAND/UL (ref 1.85–7.62)
NEUTS BAND NFR BLD MANUAL: 2 % (ref 0–8)
NEUTS SEG NFR BLD AUTO: 94 % (ref 43–75)
PLATELET # BLD AUTO: 488 THOUSANDS/UL (ref 149–390)
PLATELET BLD QL SMEAR: ABNORMAL
PMV BLD AUTO: 8.2 FL (ref 8.9–12.7)
POTASSIUM SERPL-SCNC: 5.1 MMOL/L (ref 3.5–5.3)
PROCALCITONIN SERPL-MCNC: 0.09 NG/ML
PROT SERPL-MCNC: 7.7 G/DL (ref 6.4–8.4)
PROTHROMBIN TIME: 35.2 SECONDS (ref 11.6–14.5)
RBC # BLD AUTO: 5.18 MILLION/UL (ref 3.81–5.12)
RBC MORPH BLD: PRESENT
SODIUM SERPL-SCNC: 133 MMOL/L (ref 135–147)
WBC # BLD AUTO: 21 THOUSAND/UL (ref 4.31–10.16)

## 2023-03-22 RX ORDER — DULOXETIN HYDROCHLORIDE 20 MG/1
20 CAPSULE, DELAYED RELEASE ORAL DAILY
Status: DISCONTINUED | OUTPATIENT
Start: 2023-03-23 | End: 2023-03-27 | Stop reason: HOSPADM

## 2023-03-22 RX ORDER — VALACYCLOVIR HYDROCHLORIDE 500 MG/1
500 TABLET, FILM COATED ORAL 2 TIMES DAILY
Status: DISCONTINUED | OUTPATIENT
Start: 2023-03-22 | End: 2023-03-27 | Stop reason: HOSPADM

## 2023-03-22 RX ORDER — CEFAZOLIN SODIUM 2 G/50ML
2000 SOLUTION INTRAVENOUS EVERY 8 HOURS
Status: DISCONTINUED | OUTPATIENT
Start: 2023-03-22 | End: 2023-03-22

## 2023-03-22 RX ORDER — FUROSEMIDE 40 MG/1
40 TABLET ORAL DAILY
Status: DISCONTINUED | OUTPATIENT
Start: 2023-03-23 | End: 2023-03-22

## 2023-03-22 RX ORDER — VANCOMYCIN HYDROCHLORIDE 125 MG/1
125 CAPSULE ORAL EVERY 12 HOURS SCHEDULED
Status: DISCONTINUED | OUTPATIENT
Start: 2023-03-22 | End: 2023-03-27 | Stop reason: HOSPADM

## 2023-03-22 RX ORDER — CEFEPIME HYDROCHLORIDE 2 G/50ML
2000 INJECTION, SOLUTION INTRAVENOUS EVERY 12 HOURS
Status: DISCONTINUED | OUTPATIENT
Start: 2023-03-23 | End: 2023-03-26

## 2023-03-22 RX ORDER — ACETAMINOPHEN 325 MG/1
650 TABLET ORAL EVERY 6 HOURS PRN
Status: DISCONTINUED | OUTPATIENT
Start: 2023-03-22 | End: 2023-03-27 | Stop reason: HOSPADM

## 2023-03-22 RX ORDER — SODIUM CHLORIDE, SODIUM GLUCONATE, SODIUM ACETATE, POTASSIUM CHLORIDE, MAGNESIUM CHLORIDE, SODIUM PHOSPHATE, DIBASIC, AND POTASSIUM PHOSPHATE .53; .5; .37; .037; .03; .012; .00082 G/100ML; G/100ML; G/100ML; G/100ML; G/100ML; G/100ML; G/100ML
75 INJECTION, SOLUTION INTRAVENOUS ONCE
Status: COMPLETED | OUTPATIENT
Start: 2023-03-22 | End: 2023-03-22

## 2023-03-22 RX ORDER — ALLOPURINOL 100 MG/1
100 TABLET ORAL DAILY
Status: DISCONTINUED | OUTPATIENT
Start: 2023-03-23 | End: 2023-03-27 | Stop reason: HOSPADM

## 2023-03-22 RX ORDER — CEFEPIME HYDROCHLORIDE 2 G/50ML
2000 INJECTION, SOLUTION INTRAVENOUS ONCE
Status: COMPLETED | OUTPATIENT
Start: 2023-03-22 | End: 2023-03-22

## 2023-03-22 RX ORDER — FUROSEMIDE 40 MG/1
40 TABLET ORAL DAILY
Status: DISCONTINUED | OUTPATIENT
Start: 2023-03-24 | End: 2023-03-24

## 2023-03-22 RX ORDER — WARFARIN SODIUM 2 MG/1
2 TABLET ORAL
Status: DISCONTINUED | OUTPATIENT
Start: 2023-03-23 | End: 2023-03-23

## 2023-03-22 RX ADMIN — VALACYCLOVIR HYDROCHLORIDE 500 MG: 500 TABLET, FILM COATED ORAL at 20:06

## 2023-03-22 RX ADMIN — SODIUM CHLORIDE 500 ML: 0.9 INJECTION, SOLUTION INTRAVENOUS at 18:40

## 2023-03-22 RX ADMIN — SODIUM CHLORIDE, SODIUM GLUCONATE, SODIUM ACETATE, POTASSIUM CHLORIDE AND MAGNESIUM CHLORIDE 75 ML/HR: 526; 502; 368; 37; 30 INJECTION, SOLUTION INTRAVENOUS at 21:01

## 2023-03-22 RX ADMIN — VANCOMYCIN HYDROCHLORIDE 125 MG: 125 CAPSULE ORAL at 20:02

## 2023-03-22 RX ADMIN — CEFEPIME HYDROCHLORIDE 2000 MG: 2 INJECTION, SOLUTION INTRAVENOUS at 18:40

## 2023-03-22 RX ADMIN — VANCOMYCIN HYDROCHLORIDE 1750 MG: 1 INJECTION, POWDER, LYOPHILIZED, FOR SOLUTION INTRAVENOUS at 19:47

## 2023-03-22 NOTE — H&P
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 with purulent drainage and fever at home  With history of Pseudomonas and MRD from the wound cultures  Bedside ultrasound done in the ED without evidence of abscess  The patient received cefepime and vancomycin in the ED    · Continue with ertapenem and vancomycin for now  · Follow-up with cultures  · Consult surgery and wound care      Chronic atrial fibrillation (HCC)  Assessment & Plan  · Rate is controlled  · Not on any AV blocker  · Continue anticoagulation with warfarin -taking 2 mg every other day  · Goal INR of 2-3    History of Clostridioides difficile infection  Assessment & Plan  · We will start on vancomycin 125 mg p o  every 12 hours for prophylaxis  · Will need to remain on vancomycin 72 hours post antibiotic course     Ambulatory dysfunction  Assessment & Plan  · Patient is nonambulatory at home  · Continue supportive care  · Frequent turn and reposition    Lymphedema of extremity  Assessment & Plan  · Continue with torsemide  · Elevate lower extremity and compression device    Chronic diastolic congestive heart failure (HCC)  Assessment & Plan  Wt Readings from Last 3 Encounters:   03/22/23 (!) 195 kg (429 lb 14 4 oz)   01/07/23 (!) 193 kg (426 lb)   11/14/22 (!) 193 kg (426 lb)     · Does not appears to be overtly volume overloaded  · Last echocardiogram done on 5/8/2022 shows LVEF of 55%  · Continue with medical management  Home regimen-furosemide 40 mg daily     · Daily weights, low-sodium diet, I&Os        Mild episode of recurrent major depressive disorder ([facility] [address] )  Assessment & Plan  · Continue with Cymbalta    Other specified hypothyroidism  Assessment & Plan  · Continue with levothyroxine    VTE Prophylaxis: Warfarin (Coumadin)  Code Status: full code   Discussion with family: none present during exam     Anticipated Length of Stay:  Patient will be admitted on an Inpatient basis with an anticipated length of stay of  > 2 midnights  Justification for Hospital Stay: panniculitis     Total Time for Visit, including Counseling / Coordination of Care: 45 minutes  Greater than 50% of this total time spent on direct patient counseling and coordination of care  Chief Complaint:   Fever and panniculitis    History of Present Illness:    Vinnie Roberts is a 71 y o  female who presents with fever and panniculitis  The patient with past medical history of atrial fibrillation, morbid obesity, and hypothyroidism  She presented with 24-hour history of fevers and chills at home  She states that her visiting nurse advised that she come to the ED for further evaluation due to having fevers and also worsening erythema and purulent drainage of her pannus  In the ED, the patient noted to have urine drainage from right sided pannus and leukocytosis  Review of Systems:    Review of Systems   Constitutional: Positive for chills and fever  Negative for activity change, appetite change and diaphoresis  HENT: Negative for congestion, ear pain, hearing loss, tinnitus and trouble swallowing  Eyes: Negative for photophobia, pain, discharge, itching and visual disturbance  Respiratory: Negative for cough, shortness of breath, wheezing and stridor  Cardiovascular: Negative for chest pain, palpitations and leg swelling  Gastrointestinal: Negative for abdominal pain, blood in stool, constipation, diarrhea, nausea and vomiting  Endocrine: Negative for cold intolerance, heat intolerance, polydipsia, polyphagia and polyuria  Genitourinary: Negative for difficulty urinating, dysuria, frequency, hematuria and urgency     Musculoskeletal: Negative for back pain, gait problem and neck stiffness  Skin: Positive for color change and wound  Negative for pallor and rash  Allergic/Immunologic: Negative for environmental allergies, food allergies and immunocompromised state  Neurological: Negative for dizziness, tremors, speech difficulty, weakness, light-headedness, numbness and headaches  Hematological: Negative for adenopathy  Does not bruise/bleed easily  Psychiatric/Behavioral: Negative for confusion, hallucinations and sleep disturbance  Past Medical and Surgical History:     Past Medical History:   Diagnosis Date   • Atrial fibrillation [facility])    • Bladder stone    • C  difficile colitis    • Cellulitis    • CHF (congestive heart failure) ([facility])    • Depression with anxiety    • Disease of thyroid gland    • Diverticulitis    • Enterocolitis    • History of abnormal cervical Pap smear    • Kidney stone    • Lymphedema    • Menopause     Age 53   • Morbid obesity with BMI of 70 and over, adult ([facility] )    • MRSA (methicillin resistant Staphylococcus aureus)     abdominal wound   • Osteoarthritis    • Phlebitis     left lower leg   • Spondylolysis        Past Surgical History:   Procedure Laterality Date   • APPENDECTOMY     • CARPAL TUNNEL RELEASE     • CHOLECYSTECTOMY     • CYSTOSCOPY      stent   • FOOT SURGERY  1982    bone spur   • KNEE SURGERY     • WISDOM TOOTH EXTRACTION         Meds/Allergies:    Prior to Admission medications    Medication Sig Start Date End Date Taking?  Authorizing Provider   acetaminophen (TYLENOL) 650 mg CR tablet Take 650 mg by mouth every 8 (eight) hours as needed (for osteoarthritis)    Historical Provider, MD   allopurinol (ZYLOPRIM) 100 mg tablet Take 1 tablet (100 mg total) by mouth daily 9/30/19   Donovan Cutler, DO   DULoxetine (CYMBALTA) 20 mg capsule Take 1 capsule (20 mg total) by mouth daily 8/15/18   Donovan Cutler, DO   furosemide (LASIX) 40 mg tablet Take 40 mg by mouth daily Hold for SBP < 100    Historical Provider, MD levothyroxine 125 mcg tablet Take 125 mcg by mouth daily    Historical Provider, MD   Neomycin-Bacitracin-Polymyxin (HCA TRIPLE ANTIBIOTIC OINTMENT EX) Apply topically To abdominal fold twice per day  Historical Provider, MD   oxyCODONE (ROXICODONE) 5 immediate release tablet Take 1 tablet (5 mg total) by mouth every 6 (six) hours as needed for moderate pain or severe pain for up to 5 doses Max Daily Amount: 20 mg  Patient not taking: Reported on 12/14/2022 6/14/22   ORACIO Leyva   polyethylene glycol (MIRALAX) 17 g packet Take 17 g by mouth daily  Patient not taking: Reported on 12/14/2022 9/27/22   ORACIO Brewer   Probiotic Product (PROBIOTIC DAILY PO) Take 1 each by mouth in the morning  Patient not taking: Reported on 12/14/2022    Historical Provider, MD   senna-docusate sodium (SENOKOT S) 8 6-50 mg per tablet Take 1 tablet by mouth daily at bedtime  Patient not taking: Reported on 12/14/2022 9/27/22   ORACIO Brewer   triamcinolone (KENALOG) 0 1 % cream Apply 0 1 application topically 2 (two) times a day 12/16/21   Historical Provider, MD   valACYclovir (VALTREX) 500 mg tablet Take 500 mg by mouth 2 (two) times a day    Historical Provider, MD   warfarin (COUMADIN) 2 mg tablet Take 1 tablet (2 mg total) by mouth every other day Monday, Wednesday, Friday, sunday 2/22/22   ORACIO Daniel   warfarin (COUMADIN) 4 mg tablet Take 4 mg by mouth daily Take 1 tablet by mouth every Tuesday, Thursday, and Saturday  Patient not taking: Reported on 12/14/2022    Historical Provider, MD   nystatin (MYCOSTATIN) powder Apply topically 2 (two) times a day 11/21/21 5/28/22  Kasey Chavarria MD     all medications and allergies reviewed    Allergies:    Allergies   Allergen Reactions   • Augmentin Es-600 [Amoxicillin-Pot Clavulanate] Anaphylaxis and Rash   • Bactrim [Sulfamethoxazole-Trimethoprim] Anaphylaxis   • Penicillins Anaphylaxis and Rash     Tolerates cefepime (11/18/21)   • Other Rash, "Irritability and Edema     Patient reports significant reaction to the use of Max Orb in the abdominal folds leading to swelling, excoriation, maceration and weeping of the skin  • Omeprazole GI Intolerance   • Sulfa Antibiotics Hives   • Latex Rash and Edema   • Vitamin C [Ascorbate - Food Allergy] Rash       Social History:     Marital Status: Single   Occupation: n/a   Patient Pre-hospital Living Situation: alone   Patient Pre-hospital Level of Mobility: non-ambulatory   Patient Pre-hospital Diet Restrictions: none   Substance Use History:   Social History     Substance and Sexual Activity   Alcohol Use Not Currently     Social History     Tobacco Use   Smoking Status Former   • Packs/day: 5 00   • Years: 3 00   • Pack years: 15 00   • Types: Cigarettes   • Start date: 1967   • Quit date: 1970   • Years since quittin 7   Smokeless Tobacco Never   Tobacco Comments    5 packs/day until 6 (age [age]) - As per [employer]      Social History     Substance and Sexual Activity   Drug Use Not Currently   • Types: Marijuana    Comment: As a teen - As per [employer]        Family History:  I have reviewed the patient's family history    Physical Exam:     Vitals:   Blood Pressure: 122/83 (23)  Pulse: 87 (23)  Temperature: 99 7 °F (37 6 °C) (23)  Temp Source: Oral (23)  Respirations: 20 (23)  Height: 5' 3\" (160 cm) (23)  Weight - Scale: (!) 195 kg (429 lb 14 4 oz) (23)  SpO2: 95 % (23)    Physical Exam  Vitals and nursing note reviewed  Constitutional:       General: She is not in acute distress  Appearance: Normal appearance  She is obese  Comments: Frail and chronically ill appearing      HENT:      Head: Normocephalic and atraumatic  Right Ear: External ear normal       Left Ear: External ear normal       Nose: Nose normal  No rhinorrhea        Mouth/Throat:      Mouth: Mucous membranes are moist      " Pharynx: Oropharynx is clear  Eyes:      General:         Right eye: No discharge  Left eye: No discharge  Pupils: Pupils are equal, round, and reactive to light  Cardiovascular:      Rate and Rhythm: Normal rate  Rhythm irregular  Pulses: Normal pulses  Heart sounds: Normal heart sounds  No murmur heard  Pulmonary:      Effort: Pulmonary effort is normal  No respiratory distress  Comments: Decreased   Abdominal:      General: Bowel sounds are normal  There is no distension  Palpations: Abdomen is soft  There is no mass  Tenderness: There is abdominal tenderness (right pannus)  Musculoskeletal:         General: Swelling (trace BLEs edema ) present  No tenderness  Normal range of motion  Cervical back: Normal range of motion and neck supple  No muscular tenderness  Skin:     General: Skin is warm and dry  Capillary Refill: Capillary refill takes less than 2 seconds  Findings: Erythema (right pannus) present  No rash  Neurological:      General: No focal deficit present  Mental Status: She is alert and oriented to person, place, and time  Mental status is at baseline  Psychiatric:         Mood and Affect: Mood normal          Behavior: Behavior normal          Thought Content: Thought content normal          Judgment: Judgment normal          Additional Data:     Lab Results: I have personally reviewed pertinent reports        Results from last 7 days   Lab Units 03/22/23  1736   WBC Thousand/uL 21 00*   HEMOGLOBIN g/dL 12 5   HEMATOCRIT % 43 3   PLATELETS Thousands/uL 488*   BANDS PCT % 2   LYMPHO PCT % 4*   MONO PCT % 0*   EOS PCT % 0     Results from last 7 days   Lab Units 03/22/23  1736   SODIUM mmol/L 133*   POTASSIUM mmol/L 5 1   CHLORIDE mmol/L 96   CO2 mmol/L 26   BUN mg/dL 18   CREATININE mg/dL 1 17   ANION GAP mmol/L 11   CALCIUM mg/dL 8 9   ALBUMIN g/dL 3 2*   TOTAL BILIRUBIN mg/dL 0 75   ALK PHOS U/L 107*   ALT U/L 9   AST U/L 21 GLUCOSE RANDOM mg/dL 159*     Results from last 7 days   Lab Units 03/22/23  1736   INR  3 55*             Results from last 7 days   Lab Units 03/22/23  1736   LACTIC ACID mmol/L 3 0*   PROCALCITONIN ng/ml 0 09       Imaging: I have personally reviewed pertinent reports  No orders to display         EKG, Pathology, and Other Studies Reviewed on Admission:   · EKG: Atrial fibrillation heart rate 88    Epic / Care Everywhere Records Reviewed: Yes    ** Please Note: This note has been constructed using a voice recognition system   **

## 2023-03-22 NOTE — ASSESSMENT & PLAN NOTE
· Rate is controlled  · Not on any AV blocker  · Continue anticoagulation with warfarin -taking 2 mg every other day  · Goal INR of 2-3

## 2023-03-22 NOTE — ASSESSMENT & PLAN NOTE
· We will start on vancomycin 125 mg p o  every 12 hours for prophylaxis  · Will need to remain on vancomycin 72 hours post antibiotic course

## 2023-03-22 NOTE — ED PROVIDER NOTES
History  Chief Complaint   Patient presents with   • Fever - 9 weeks to 74 years   • Nausea   • Cellulitis     Arrives via EMS from home c/o fever, HA , nausea noted today  Pt also c/o redness to LLQ w/ discharges   80-year-old female with chronic recurrent cellulitis related to lymphadenopathy  Presents today with fevers at home, currently having a headache and nausea as well  Has been having some redness in the right side of her abdomen which is progressive  Her caregivers today noticed this is increased and is now reaching the posterior aspect of her pannus  She has chills but was unsure of fever  At the house she was 100 3 per staff members, here she is 99 7  She has no chest pain, denies shortness of breath  She is bedridden  She does live at home and has a 12-hour caregiver  She is nonambulatory but recently has progressed to sitting on the side of the bed  She is morbidly obese with a BMI of 76  Complicating her history is a past history of C  difficile colitis, MRSA, and a recent case of shingles  She is on chronic anticoagulation as well  Prior to Admission Medications   Prescriptions Last Dose Informant Patient Reported? Taking? DULoxetine (CYMBALTA) 20 mg capsule   No No   Sig: Take 1 capsule (20 mg total) by mouth daily   Neomycin-Bacitracin-Polymyxin ([facility]CA TRIPLE ANTIBIOTIC OINTMENT EX)   Yes No   Sig: Apply topically To abdominal fold twice per day     Probiotic Product (PROBIOTIC DAILY PO)   Yes No   Sig: Take 1 each by mouth in the morning   Patient not taking: Reported on 12/14/2022   acetaminophen (TYLENOL) 650 mg CR tablet   Yes No   Sig: Take 650 mg by mouth every 8 (eight) hours as needed (for osteoarthritis)   allopurinol (ZYLOPRIM) 100 mg tablet   No No   Sig: Take 1 tablet (100 mg total) by mouth daily   furosemide (LASIX) 40 mg tablet   Yes No   Sig: Take 40 mg by mouth daily Hold for SBP < 100   levothyroxine 125 mcg tablet   Yes No   Sig: Take 125 mcg by mouth daily   oxyCODONE (ROXICODONE) 5 immediate release tablet   No No   Sig: Take 1 tablet (5 mg total) by mouth every 6 (six) hours as needed for moderate pain or severe pain for up to 5 doses Max Daily Amount: 20 mg   Patient not taking: Reported on 12/14/2022   polyethylene glycol (MIRALAX) 17 g packet   No No   Sig: Take 17 g by mouth daily   Patient not taking: Reported on 12/14/2022   senna-docusate sodium (SENOKOT S) 8 6-50 mg per tablet   No No   Sig: Take 1 tablet by mouth daily at bedtime   Patient not taking: Reported on 12/14/2022   triamcinolone (KENALOG) 0 1 % cream   Yes No   Sig: Apply 0 1 application topically 2 (two) times a day   valACYclovir (VALTREX) 500 mg tablet   Yes No   Sig: Take 500 mg by mouth 2 (two) times a day   warfarin (COUMADIN) 2 mg tablet   No No   Sig: Take 1 tablet (2 mg total) by mouth every other day Monday, Wednesday, Friday, sunday   warfarin (COUMADIN) 4 mg tablet   Yes No   Sig: Take 4 mg by mouth daily Take 1 tablet by mouth every Tuesday, Thursday, and Saturday   Patient not taking: Reported on 12/14/2022      Facility-Administered Medications: None       Past Medical History:   Diagnosis Date   • Atrial fibrillation [facility])    • Bladder stone    • C  difficile colitis    • Cellulitis    • CHF (congestive heart failure) ([facility] )    • Depression with anxiety    • Disease of thyroid gland    • Diverticulitis    • Enterocolitis    • History of abnormal cervical Pap smear    • Kidney stone    • Lymphedema    • Menopause     Age 53   • Morbid obesity with BMI of 70 and over, adult ([facility] )    • MRSA (methicillin resistant Staphylococcus aureus)     abdominal wound   • Osteoarthritis    • Phlebitis     left lower leg   • Spondylolysis        Past Surgical History:   Procedure Laterality Date   • APPENDECTOMY     • CARPAL TUNNEL RELEASE     • CHOLECYSTECTOMY     • CYSTOSCOPY      stent   • FOOT SURGERY  1982    bone spur   • KNEE SURGERY     • WISDOM TOOTH EXTRACTION         Family History Problem Relation Age of Onset   • Heart failure Mother    • Heart disease Mother    • Lymphoma Mother    • Seizures Mother    • Kidney disease Father    • Heart disease Father    • Heart failure Father    • Diabetes type II Father    • Diabetes type II Sister    • Diabetes type II Brother    • Uterine cancer Paternal Aunt    • Breast cancer Neg Hx    • Colon cancer Neg Hx    • Ovarian cancer Neg Hx      I have reviewed and agree with the history as documented  E-Cigarette/Vaping   • E-Cigarette Use Never User      E-Cigarette/Vaping Substances   • Nicotine No    • THC No    • CBD No    • Flavoring No    • Other No    • Unknown No      Social History     Tobacco Use   • Smoking status: Former     Packs/day: 5 00     Years: 3 00     Pack years: 15 00     Types: Cigarettes     Start date: 1967     Quit date: 1970     Years since quittin 7   • Smokeless tobacco: Never   • Tobacco comments:     5 packs/day until 5 (age 17-19) - As per [employer]    Vaping Use   • Vaping Use: Never used   Substance Use Topics   • Alcohol use: Not Currently   • Drug use: Not Currently     Types: Marijuana     Comment: As a teen - As per [employer]        Review of Systems   Constitutional: Positive for chills and fever  HENT: Negative for rhinorrhea and sore throat  Eyes: Negative for visual disturbance  Respiratory: Negative for cough and shortness of breath  Cardiovascular: Negative for chest pain and leg swelling  Chronic lymphedema   Gastrointestinal: Positive for nausea  Negative for abdominal pain, diarrhea and vomiting  Genitourinary: Negative for dysuria  Musculoskeletal: Negative for back pain and myalgias  Skin: Negative for rash  Neurological: Positive for weakness and headaches  Negative for dizziness  Psychiatric/Behavioral: Negative for confusion  All other systems reviewed and are negative  Physical Exam  Physical Exam  Vitals and nursing note reviewed     Constitutional: Appearance: Normal appearance  She is well-developed  She is obese  She is ill-appearing  HENT:      Head: Normocephalic and atraumatic  Nose: Nose normal       Mouth/Throat:      Pharynx: No oropharyngeal exudate  Eyes:      General: No scleral icterus  Conjunctiva/sclera: Conjunctivae normal       Pupils: Pupils are equal, round, and reactive to light  Neck:      Vascular: No JVD  Trachea: No tracheal deviation  Cardiovascular:      Rate and Rhythm: Normal rate and regular rhythm  Heart sounds: Normal heart sounds  No murmur heard  Pulmonary:      Effort: Pulmonary effort is normal  No respiratory distress  Breath sounds: Normal breath sounds  No wheezing or rales  Abdominal:      General: Bowel sounds are normal       Palpations: Abdomen is soft  Tenderness: There is abdominal tenderness  There is no guarding  Comments: Cellulitic area of the abdomen on the right pannus standing laterally and into the lower back area  Musculoskeletal:         General: No tenderness  Normal range of motion  Cervical back: Normal range of motion and neck supple  Right lower leg: Edema present  Left lower leg: Edema present  Comments: Chronic lower extremity lymphedema   Skin:     General: Skin is warm  Neurological:      Mental Status: She is alert and oriented to person, place, and time  Cranial Nerves: No cranial nerve deficit  Sensory: No sensory deficit  Motor: No abnormal muscle tone        Comments: 5/5 motor, nl sens   Psychiatric:         Mood and Affect: Mood normal          Behavior: Behavior normal          Vital Signs  ED Triage Vitals [03/22/23 1713]   Temperature Pulse Respirations Blood Pressure SpO2   99 7 °F (37 6 °C) 87 20 122/83 95 %      Temp Source Heart Rate Source Patient Position - Orthostatic VS BP Location FiO2 (%)   Oral Monitor Lying Left arm --      Pain Score       --           Vitals:    03/22/23 1713 03/22/23 1945   BP: 122/83 105/53   Pulse: 87 84   Patient Position - Orthostatic VS: Lying Lying         Visual Acuity      ED Medications  Medications   vancomycin (VANCOCIN) 1,750 mg in sodium chloride 0 9 % 500 mL IVPB (1,750 mg Intravenous New Bag 3/22/23 1947)   allopurinol (ZYLOPRIM) tablet 100 mg (has no administration in time range)   DULoxetine (CYMBALTA) delayed release capsule 20 mg (has no administration in time range)   levothyroxine tablet 125 mcg (has no administration in time range)   valACYclovir (VALTREX) tablet 500 mg (500 mg Oral Given 3/22/23 2006)   warfarin (COUMADIN) tablet 2 mg (has no administration in time range)   vancomycin (VANCOCIN) capsule 125 mg (125 mg Oral Given 3/22/23 2002)   acetaminophen (TYLENOL) tablet 650 mg (has no administration in time range)   cefepime (MAXIPIME) IVPB (premix in dextrose) 2,000 mg 50 mL (has no administration in time range)   multi-electrolyte (PLASMALYTE-A/ISOLYTE-S PH 7 4) IV solution (has no administration in time range)   furosemide (LASIX) tablet 40 mg (has no administration in time range)   cefepime (MAXIPIME) IVPB (premix in dextrose) 2,000 mg 50 mL (2,000 mg Intravenous New Bag 3/22/23 1840)   sodium chloride 0 9 % bolus 500 mL (500 mL Intravenous New Bag 3/22/23 1840)       Diagnostic Studies  Results Reviewed     Procedure Component Value Units Date/Time    Wound culture and Gram stain [[id number]] Collected: 03/22/23 1828    Lab Status: In process Specimen: Wound from Abdominal Updated: 03/22/23 1831    Lactic acid [[id number]]  (Abnormal) Collected: 03/22/23 1736    Lab Status: Final result Specimen: Blood from Arm, Right Updated: 03/22/23 1825     LACTIC ACID 3 0 mmol/L     Narrative:      Result may be elevated if tourniquet was used during collection      Lactic acid 2 Hours [[id number]]     Lab Status: No result Specimen: Blood     Procalcitonin [[id number]]  (Normal) Collected: 03/22/23 1736    Lab Status: Final result Specimen: Blood from Arm, Right Updated: 03/22/23 1817     Procalcitonin 0 09 ng/ml     CBC and differential [[id number]]  (Abnormal) Collected: 03/22/23 1736    Lab Status: Final result Specimen: Blood from Arm, Right Updated: 03/22/23 1814     WBC 21 00 Thousand/uL      RBC 5 18 Million/uL      Hemoglobin 12 5 g/dL      Hematocrit 43 3 %      MCV 84 fL      MCH 24 1 pg      MCHC 28 9 g/dL      RDW 17 6 %      MPV 8 2 fL      Platelets 161 Thousands/uL     Narrative: This is an appended report  These results have been appended to a previously verified report      Manual Differential(PHLEBS Do Not Order) [[id number]]  (Abnormal) Collected: 03/22/23 1736    Lab Status: Final result Specimen: Blood from Arm, Right Updated: 03/22/23 1814     Segmented % 94 %      Bands % 2 %      Lymphocytes % 4 %      Monocytes % 0 %      Eosinophils, % 0 %      Basophils % 0 %      Absolute Neutrophils 20 16 Thousand/uL      Lymphocytes Absolute 0 84 Thousand/uL      Monocytes Absolute 0 00 Thousand/uL      Eosinophils Absolute 0 00 Thousand/uL      Basophils Absolute 0 00 Thousand/uL      Total Counted --     RBC Morphology Present     Anisocytosis Present     Platelet Estimate Increased    Comprehensive metabolic panel [[id number]]  (Abnormal) Collected: 03/22/23 1736    Lab Status: Final result Specimen: Blood from Arm, Right Updated: 03/22/23 1808     Sodium 133 mmol/L      Potassium 5 1 mmol/L      Chloride 96 mmol/L      CO2 26 mmol/L      ANION GAP 11 mmol/L      BUN 18 mg/dL      Creatinine 1 17 mg/dL      Glucose 159 mg/dL      Calcium 8 9 mg/dL      Corrected Calcium 9 5 mg/dL      AST 21 U/L      ALT 9 U/L      Alkaline Phosphatase 107 U/L      Total Protein 7 7 g/dL      Albumin 3 2 g/dL      Total Bilirubin 0 75 mg/dL      eGFR 46 ml/min/1 73sq m     Narrative:      [facility] guidelines for Chronic Kidney Disease (CKD):   •  Stage 1 with normal or high GFR (GFR > 90 mL/min/1 73 square meters)  •  Stage 2 Mild CKD (GFR = 60-89 mL/min/1 73 square meters)  •  Stage 3A Moderate CKD (GFR = 45-59 mL/min/1 73 square meters)  •  Stage 3B Moderate CKD (GFR = 30-44 mL/min/1 73 square meters)  •  Stage 4 Severe CKD (GFR = 15-29 mL/min/1 73 square meters)  •  Stage 5 End Stage CKD (GFR <15 mL/min/1 73 square meters)  Note: GFR calculation is accurate only with a steady state creatinine    Protime-INR [[id number]]  (Abnormal) Collected: 03/22/23 1736    Lab Status: Final result Specimen: Blood from Arm, Right Updated: 03/22/23 1806     Protime 35 2 seconds      INR 3 55    APTT [[id number]]  (Abnormal) Collected: 03/22/23 1736    Lab Status: Final result Specimen: Blood from Arm, Right Updated: 03/22/23 1806     PTT 67 seconds     Blood culture #2 [[id number]] Collected: 03/22/23 1736    Lab Status: In process Specimen: Blood from Arm, Right Updated: 03/22/23 1745    Blood culture #1 [[id number]] Collected: 03/22/23 1738    Lab Status:  In process Specimen: Blood from Arm, Left Updated: 03/22/23 1745    UA w Reflex to Microscopic w Reflex to Culture [[id number]]     Lab Status: No result Specimen: Urine                  No orders to display              Procedures  ECG 12 Lead Documentation Only    Date/Time: 3/22/2023 5:35 PM  Performed by: Mi Carranza DO  Authorized by: Mi Carranza DO     Indications / Diagnosis:  Celllulitis  ECG reviewed by me, the ED Provider: yes    Patient location:  ED  Previous ECG:     Previous ECG:  Compared to current    Similarity:  No change    Comparison to cardiac monitor: Yes    Interpretation:     Interpretation: normal    Rate:     ECG rate:  88    ECG rate assessment: normal    Rhythm:     Rhythm: sinus rhythm and atrial fibrillation    Ectopy:     Ectopy: none    QRS:     QRS axis:  Left    QRS intervals:  Normal  Conduction:     Conduction: normal    ST segments:     ST segments:  Non-specific  T waves:     T waves: non-specific               ED Course  ED Course as of 03/22/23 2023   Wed Mar 22, 2023 1825 The 30ml/kg fluid bolus was not given to the patient as she did not have hypotension and/or significantly elevated lactate of = 4 and/or presence of septic shock due to: Concern for fluid/volume overload  The patient will be administered 500 ml of crystalloid fluid instead  Orders for this have been placed in Epic  The patient may receive additional colloid or crystalloid fluids thereafter based on clinical condition  Aj Thakkar, DO    2015 Bedside ultrasound was performed by Dr Jenniffer Brown revealing no dominant collection within the soft tissues to suggest abscess, cobblestoning was appreciated                                             Medical Decision Making  56-year-old morbidly obese female with recurrent panniculitis presents with red hot swollen pannus, right-sided tending to the posterior aspect of the pannus  Small skin superficial opening with purulent drainage noted  Acute panniculitis: acute illness or injury  Cellulitis: acute illness or injury  Amount and/or Complexity of Data Reviewed  Labs: ordered  Details: Elevated white count to 22,000 and lactate of 3, otherwise hemodynamically stable  ECG/medicine tests: ordered and independent interpretation performed  Details: No significant abnormalities or change  Discussion of management or test interpretation with external provider(s): Test was reviewed with hospital medicine  A bedside ultrasound was performed revealing no significant subcutaneous collection to suggest abdominal wall abscess  Cobblestoning was appreciated  Given the recurrent nature of her issues vancomycin and cefepime were started  A culture of the draining culture of the draining wound in the right pannus was performed  There was no guarding on abdominal exam to suggest an intra-abdominal abnormality  Remained hemodynamically stable during her stay in the emergency room  Risk  Decision regarding hospitalization            Disposition  Final diagnoses:   Cellulitis   Acute panniculitis     Time reflects when diagnosis was documented in both MDM as applicable and the Disposition within this note     Time User Action Codes Description Comment    3/22/2023  6:43 PM Sanchez Bolivar Add [L03 90] Cellulitis     3/22/2023  6:46 PM Sanchez Newcastle Add [M79 3] Acute panniculitis       ED Disposition     ED Disposition   Admit    Condition   Stable    Date/Time   Wed Mar 22, 2023  6:43 PM    Comment   Case was discussed with Yordan Bello  and the patient's admission status was agreed to be Admission Status: inpatient status to the service of Dr Gracie Harrington   Follow-up Information    None         Patient's Medications   Discharge Prescriptions    No medications on file       No discharge procedures on file      PDMP Review       Value Time User    PDMP Reviewed  Yes 5/27/2022 11:10 PM Anjelica Kay PA-C          ED Provider  Electronically Signed by           Ashley Chase DO  03/22/23 2023

## 2023-03-22 NOTE — ASSESSMENT & PLAN NOTE
Wt Readings from Last 3 Encounters:   03/22/23 (!) 195 kg (429 lb 14 4 oz)   01/07/23 (!) 193 kg (426 lb)   11/14/22 (!) 193 kg (426 lb)     · Appears euvolemic, weight stable  · 5/8/2022 echocardiogram: LVEF 55% with normal systolic function   · Patient received IV fluid hydration yesterday, home Lasix on hold  · Resume home Lasix dosing of 40 mg daily tomorrow  · Monitor daily weights  · Monitor intake and output  · Low-salt diet

## 2023-03-22 NOTE — ASSESSMENT & PLAN NOTE
Wt Readings from Last 3 Encounters:   03/22/23 (!) 195 kg (429 lb 14 4 oz)   01/07/23 (!) 193 kg (426 lb)   11/14/22 (!) 193 kg (426 lb)     · Does not appears to be overtly volume overloaded  · Last echocardiogram done on 5/8/2022 shows LVEF of 55%  · Continue with medical management  Home regimen-furosemide 40 mg daily     · Daily weights, low-sodium diet, I&Os

## 2023-03-22 NOTE — ASSESSMENT & PLAN NOTE
· POA, lactic acid 3 0   · Likely due to acute infection and fevers   · Received IV fluid resuscitation on arrival, repeat lactic acid 1 7

## 2023-03-22 NOTE — ASSESSMENT & PLAN NOTE
Recurrent panniculitis catered on right pannus with purulent drainage and fever at home  With history of Pseudomonas and MRD from the wound cultures     Bedside ultrasound done in the ED without evidence of abscess  The patient received cefepime and vancomycin in the ED    · Continue with ertapenem and vancomycin for now  · Follow-up with cultures  · Consult surgery and wound care

## 2023-03-23 ENCOUNTER — APPOINTMENT (INPATIENT)
Dept: ULTRASOUND IMAGING | Facility: HOSPITAL | Age: 73
End: 2023-03-23

## 2023-03-23 LAB
ANION GAP SERPL CALCULATED.3IONS-SCNC: 5 MMOL/L (ref 4–13)
ATRIAL RATE: 77 BPM
BILIRUB UR QL STRIP: NEGATIVE
BUN SERPL-MCNC: 17 MG/DL (ref 5–25)
CALCIUM SERPL-MCNC: 8.5 MG/DL (ref 8.4–10.2)
CHLORIDE SERPL-SCNC: 98 MMOL/L (ref 96–108)
CLARITY UR: CLEAR
CO2 SERPL-SCNC: 30 MMOL/L (ref 21–32)
COLOR UR: YELLOW
CREAT SERPL-MCNC: 1.21 MG/DL (ref 0.6–1.3)
ERYTHROCYTE [DISTWIDTH] IN BLOOD BY AUTOMATED COUNT: 17.2 % (ref 11.6–15.1)
GFR SERPL CREATININE-BSD FRML MDRD: 45 ML/MIN/1.73SQ M
GLUCOSE SERPL-MCNC: 155 MG/DL (ref 65–140)
GLUCOSE UR STRIP-MCNC: NEGATIVE MG/DL
HCT VFR BLD AUTO: 37.7 % (ref 34.8–46.1)
HGB BLD-MCNC: 11.2 G/DL (ref 11.5–15.4)
HGB UR QL STRIP.AUTO: NEGATIVE
INR PPP: 3.88 (ref 0.84–1.19)
KETONES UR STRIP-MCNC: NEGATIVE MG/DL
LEUKOCYTE ESTERASE UR QL STRIP: NEGATIVE
MCH RBC QN AUTO: 24.3 PG (ref 26.8–34.3)
MCHC RBC AUTO-ENTMCNC: 29.7 G/DL (ref 31.4–37.4)
MCV RBC AUTO: 82 FL (ref 82–98)
NITRITE UR QL STRIP: NEGATIVE
PH UR STRIP.AUTO: 5.5 [PH]
PLATELET # BLD AUTO: 419 THOUSANDS/UL (ref 149–390)
PMV BLD AUTO: 8.2 FL (ref 8.9–12.7)
POTASSIUM SERPL-SCNC: 4.2 MMOL/L (ref 3.5–5.3)
PROT UR STRIP-MCNC: NEGATIVE MG/DL
PROTHROMBIN TIME: 37.7 SECONDS (ref 11.6–14.5)
QRS AXIS: -43 DEGREES
QRSD INTERVAL: 86 MS
QT INTERVAL: 356 MS
QTC INTERVAL: 430 MS
RBC # BLD AUTO: 4.6 MILLION/UL (ref 3.81–5.12)
SODIUM SERPL-SCNC: 133 MMOL/L (ref 135–147)
SP GR UR STRIP.AUTO: 1.02
T WAVE AXIS: 102 DEGREES
UROBILINOGEN UR QL STRIP.AUTO: 0.2 E.U./DL
VANCOMYCIN SERPL-MCNC: 12.8 UG/ML (ref 10–20)
VENTRICULAR RATE: 88 BPM
WBC # BLD AUTO: 15.41 THOUSAND/UL (ref 4.31–10.16)

## 2023-03-23 RX ADMIN — VALACYCLOVIR HYDROCHLORIDE 500 MG: 500 TABLET, FILM COATED ORAL at 17:42

## 2023-03-23 RX ADMIN — Medication 1500 MG: at 08:30

## 2023-03-23 RX ADMIN — DULOXETINE 20 MG: 20 CAPSULE, DELAYED RELEASE ORAL at 08:30

## 2023-03-23 RX ADMIN — CEFEPIME HYDROCHLORIDE 2000 MG: 2 INJECTION, SOLUTION INTRAVENOUS at 18:24

## 2023-03-23 RX ADMIN — CEFEPIME HYDROCHLORIDE 2000 MG: 2 INJECTION, SOLUTION INTRAVENOUS at 06:45

## 2023-03-23 RX ADMIN — VALACYCLOVIR HYDROCHLORIDE 500 MG: 500 TABLET, FILM COATED ORAL at 08:30

## 2023-03-23 RX ADMIN — ALLOPURINOL 100 MG: 100 TABLET ORAL at 08:30

## 2023-03-23 RX ADMIN — VANCOMYCIN HYDROCHLORIDE 125 MG: 125 CAPSULE ORAL at 08:30

## 2023-03-23 RX ADMIN — VANCOMYCIN HYDROCHLORIDE 125 MG: 125 CAPSULE ORAL at 20:18

## 2023-03-23 RX ADMIN — LEVOTHYROXINE SODIUM 125 MCG: 25 TABLET ORAL at 05:04

## 2023-03-23 NOTE — PROGRESS NOTES
Lyudmila Donald is a 71 y o  female who is currently ordered Vancomycin IV with management by the Pharmacy Consult service  Relevant clinical data and objective / subjective history reviewed  Vancomycin Assessment:  Indication and Goal AUC/Trough: Soft tissue (goal -600, trough >10)  Clinical Status: stable  Micro:   pending  Renal Function:  SCr: 1 17 mg/dL  CrCl: 76 6 mL/min  Renal replacement: Not on dialysis  Days of Therapy: 1  Current Dose: 3000 mg iv q 12 hrs  Vancomycin Plan:  New Dosin mg iv once loading dose, followed by pulse dosing 1500 mg iv once random is below 15    Next Level: 0600 23  Renal Function Monitoring: Daily BMP and [place] will continue to follow closely for s/sx of nephrotoxicity, infusion reactions and appropriateness of therapy  BMP and CBC will be ordered per protocol  We will continue to follow the patient’s culture results and clinical progress daily      Kalina Gonzales, Pharmacist

## 2023-03-23 NOTE — PLAN OF CARE
Problem: Potential for Falls  Goal: Patient will remain free of falls  Description: INTERVENTIONS:  - Educate patient/family on patient safety including physical limitations  - Instruct patient to call for assistance with activity   - Consult OT/PT to assist with strengthening/mobility   - Keep Call bell within reach  - Keep bed low and locked with side rails adjusted as appropriate  - Keep care items and personal belongings within reach  - Initiate and maintain comfort rounds  - Make Fall Risk Sign visible to staff  - Offer Toileting every 2 Hours, in advance of need  - Initiate/Maintain bed alarm  - Obtain necessary fall risk management equipment  - Apply yellow socks and bracelet for high fall risk patients  - Consider moving patient to room near nurses station  Outcome: Progressing     Problem: MOBILITY - ADULT  Goal: Maintain or return to baseline ADL function  Description: INTERVENTIONS:  - Educate patient/family on patient safety including physical limitations  - Instruct patient to call for assistance with activity   - Consult OT/PT to assist with strengthening/mobility   - Keep Call bell within reach  - Keep bed low and locked with side rails adjusted as appropriate  - Keep care items and personal belongings within reach  - Initiate and maintain comfort rounds  - Make Fall Risk Sign visible to staff  - Offer Toileting every 2 Hours, in advance of need  - Initiate/Maintain bed alarm  - Obtain necessary fall risk management equipment  - Apply yellow socks and bracelet for high fall risk patients  - Consider moving patient to room near nurses station  Outcome: Progressing  Goal: Maintains/Returns to pre admission functional level  Description: INTERVENTIONS:  - Perform BMAT or MOVE assessment daily    - Set and communicate daily mobility goal to care team and patient/family/caregiver     - Collaborate with rehabilitation services on mobility goals if consulted  - Out of bed for toileting  - Record patient progress and toleration of activity level   Outcome: Progressing     Problem: PAIN - ADULT  Goal: Verbalizes/displays adequate comfort level or baseline comfort level  Description: Interventions:  - Encourage patient to monitor pain and request assistance  - Assess pain using appropriate pain scale  - Administer analgesics based on type and severity of pain and evaluate response  - Implement non-pharmacological measures as appropriate and evaluate response  - Consider cultural and social influences on pain and pain management  - Notify physician/advanced practitioner if interventions unsuccessful or patient reports new pain  Outcome: Progressing     Problem: INFECTION - ADULT  Goal: Absence or prevention of progression during hospitalization  Description: INTERVENTIONS:  - Assess and monitor for signs and symptoms of infection  - Monitor lab/diagnostic results  - Monitor all insertion sites, i e  indwelling lines, tubes, and drains  - Monitor endotracheal if appropriate and nasal secretions for changes in amount and color  - [place] appropriate cooling/warming therapies per order  - Administer medications as ordered  - Instruct and encourage patient and family to use good hand hygiene technique  - Identify and instruct in appropriate isolation precautions for identified infection/condition  Outcome: Progressing     Problem: SAFETY ADULT  Goal: Patient will remain free of falls  Description: INTERVENTIONS:  - Educate patient/family on patient safety including physical limitations  - Instruct patient to call for assistance with activity   - Consult OT/PT to assist with strengthening/mobility   - Keep Call bell within reach  - Keep bed low and locked with side rails adjusted as appropriate  - Keep care items and personal belongings within reach  - Initiate and maintain comfort rounds  - Make Fall Risk Sign visible to staff  - Offer Toileting every 2 Hours, in advance of need  - Initiate/Maintain bed alarm  - Obtain necessary fall risk management equipment  - Apply yellow socks and bracelet for high fall risk patients  - Consider moving patient to room near nurses station  Outcome: Progressing  Goal: Maintain or return to baseline ADL function  Description: INTERVENTIONS:  - Educate patient/family on patient safety including physical limitations  - Instruct patient to call for assistance with activity   - Consult OT/PT to assist with strengthening/mobility   - Keep Call bell within reach  - Keep bed low and locked with side rails adjusted as appropriate  - Keep care items and personal belongings within reach  - Initiate and maintain comfort rounds  - Make Fall Risk Sign visible to staff  - Offer Toileting every 2 Hours, in advance of need  - Initiate/Maintain bed alarm  - Obtain necessary fall risk management equipment  - Apply yellow socks and bracelet for high fall risk patients  - Consider moving patient to room near nurses station  Outcome: Progressing  Goal: Maintains/Returns to pre admission functional level  Description: INTERVENTIONS:  - Perform BMAT or MOVE assessment daily    - Set and communicate daily mobility goal to care team and patient/family/caregiver     - Collaborate with rehabilitation services on mobility goals if consulted  - Out of bed for toileting  - Record patient progress and toleration of activity level   Outcome: Progressing     Problem: DISCHARGE PLANNING  Goal: Discharge to home or other facility with appropriate resources  Description: INTERVENTIONS:  - Identify barriers to discharge w/patient and caregiver  - Arrange for needed discharge resources and transportation as appropriate  - Identify discharge learning needs (meds, wound care, etc )  - Arrange for interpretive services to assist at discharge as needed  - Refer to Case Management Department for coordinating discharge planning if the patient needs post-hospital services based on physician/advanced practitioner order or complex needs related to functional status, cognitive ability, or social support system  Outcome: Progressing     Problem: Knowledge Deficit  Goal: Patient/family/caregiver demonstrates understanding of disease process, treatment plan, medications, and discharge instructions  Description: Complete learning assessment and assess knowledge base    Interventions:  - Provide teaching at level of understanding  - Provide teaching via preferred learning methods  Outcome: Progressing     Problem: Prexisting or High Potential for Compromised Skin Integrity  Goal: Skin integrity is maintained or improved  Description: INTERVENTIONS:  - Identify patients at risk for skin breakdown  - Assess and monitor skin integrity  - Assess and monitor nutrition and hydration status  - Monitor labs   - Assess for incontinence   - Turn and reposition patient  - Assist with mobility/ambulation  - Relieve pressure over bony prominences  - Avoid friction and shearing  - Provide appropriate hygiene as needed including keeping skin clean and dry  - Evaluate need for skin moisturizer/barrier cream  - Collaborate with interdisciplinary team   - Patient/family teaching  - Consider wound care consult   Outcome: Progressing

## 2023-03-23 NOTE — ASSESSMENT & PLAN NOTE
Recurrent panniculitis catered on right pannus with purulent drainage and fever at home  With history of Pseudomonas and MRD from the wound cultures     Bedside ultrasound done in the ED without evidence of abscess  The patient received cefepime and vancomycin in the ED    · Blood cultures pending   · Wound culture +1 growth of Staphylococcus aureus   · Continue with cefepime and Vanco  · Surgery consulted  · Continue local wound care

## 2023-03-23 NOTE — ASSESSMENT & PLAN NOTE
· Continue prophylactic oral vancomycin   · Will need to remain on vancomycin 72 hours post antibiotic course

## 2023-03-23 NOTE — UTILIZATION REVIEW
Initial Clinical Review    Admission: Date/Time/Statement:   Admission Orders (From admission, onward)     Ordered        03/22/23 1852  Inpatient Admission  Once                      Orders Placed This Encounter   Procedures   • Inpatient Admission     Standing Status:   Standing     Number of Occurrences:   1     Order Specific Question:   Level of Care     Answer:   Med Surg [16]     Order Specific Question:   Estimated length of stay     Answer:   More than 2 Midnights     Order Specific Question:   Certification     Answer:   I certify that inpatient services are medically necessary for this patient for a duration of greater than two midnights  See H&P and MD Progress Notes for additional information about the patient's course of treatment  ED Arrival Information     Expected   3/22/2023 16:54    Arrival   3/22/2023 17:04    Acuity   Urgent            Means of arrival   Ambulance    Escorted by   [facility]    Service   Hospitalist    Admission type   Emergency            Arrival complaint   fever           Chief Complaint   Patient presents with   • Fever - 9 weeks to 74 years   • Nausea   • Cellulitis     Arrives via EMS from home c/o fever, HA , nausea noted today  Pt also c/o redness to LLQ w/ discharges   Initial Presentation: 71 y o  female to the ED from home via EMS with complaints of fever, cellulitis, nausea  Admitted to inpatient for panniculitis, afib, c-diff  H/O afib, morbid obesity  Has been having chills, fever at home  She is nonambulatory at baseline  Arrives appearing ill, abdominal tenderness with cellulitic area to abdomen, right pannus  Bilateral lower extremity edema, chronic lower extremity lymphedema  WBCs 21 00, lactic acid 3 0  INR 3 55  NOt given iv fluid bolus per sepsis due to concern for volume overload  Given 500 ml bolus  Blood cultures and wound cultures pending  IV abx started  Heart rated controlled  Start vanco po      Right buttock:        Date: 3/23   Day 2:     Gen/surg:  US Preliminary wound culture 1+ Staph aureus   Pannus with erythematous, indurated, warm edematous areas of left and right lower areas of abdomen; no palpable areas of fluctuance  WBcs improved  Lactic acid cleared  Check US bilateral lower abdominal quadrants for possible areas to drain   Aqua k pad to affected areas         ED Triage Vitals   Temperature Pulse Respirations Blood Pressure SpO2   03/22/23 1713 03/22/23 1713 03/22/23 1713 03/22/23 1713 03/22/23 1713   99 7 °F (37 6 °C) 87 20 122/83 95 %      Temp Source Heart Rate Source Patient Position - Orthostatic VS BP Location FiO2 (%)   03/22/23 1713 03/22/23 1713 03/22/23 1713 03/22/23 1713 --   Oral Monitor Lying Left arm       Pain Score       03/22/23 2044       8          Wt Readings from Last 1 Encounters:   03/23/23 (!) 197 kg (434 lb 4 9 oz)     Additional Vital Signs:   Date/Time Temp Pulse Resp BP MAP (mmHg) SpO2 O2 Device Patient Position - Orthostatic VS   03/23/23 08:31:34 -- 92 -- 109/62 78 95 % -- --   03/23/23 0631 99 1 °F (37 3 °C) 86 18 98/56 70 94 % None (Room air) Lying   03/22/23 22:17:50 98 4 °F (36 9 °C) 93 16 110/53 72 92 % -- --   03/22/23 20:28:41 98 8 °F (37 1 °C) 84 20 117/59 78 94 % -- --   03/22/23 1945 -- 84 20 105/53 -- 91 % None (Room air) Lying   03/22/23 1713 99 7 °F (37 6 °C) 87 20 122/83 100 95 % None (Room air) Lying       Pertinent Labs/Diagnostic Test Results:   3/22 EKG:   Atrial fibrillation  Left axis deviation  Low voltage QRS  When compared with ECG of 07-JAN-2023 18:49,  No significant change was found  Results from last 7 days   Lab Units 03/23/23  0432 03/22/23  1736   WBC Thousand/uL 15 41* 21 00*   HEMOGLOBIN g/dL 11 2* 12 5   HEMATOCRIT % 37 7 43 3   PLATELETS Thousands/uL 419* 488*   BANDS PCT %  --  2     Results from last 7 days   Lab Units 03/23/23  0432 03/22/23  1736   SODIUM mmol/L 133* 133*   POTASSIUM mmol/L 4 2 5 1   CHLORIDE mmol/L 98 96   CO2 mmol/L 30 26   ANION GAP mmol/L 5 11   BUN mg/dL 17 18   CREATININE mg/dL 1 21 1 17   EGFR ml/min/1 73sq m 45 46   CALCIUM mg/dL 8 5 8 9     Results from last 7 days   Lab Units 03/22/23  1736   AST U/L 21   ALT U/L 9   ALK PHOS U/L 107*   TOTAL PROTEIN g/dL 7 7   ALBUMIN g/dL 3 2*   TOTAL BILIRUBIN mg/dL 0 75     Results from last 7 days   Lab Units 03/23/23  0432 03/22/23  1736   GLUCOSE RANDOM mg/dL 155* 159*         Results from last 7 days   Lab Units 03/23/23  0432 03/22/23  1736   PROTIME seconds 37 7* 35 2*   INR  3 88* 3 55*   PTT seconds  --  67*         Results from last 7 days   Lab Units 03/22/23  1736   PROCALCITONIN ng/ml 0 09     Results from last 7 days   Lab Units 03/22/23  2146 03/22/23  1736   LACTIC ACID mmol/L 1 6 3 0*         Results from last 7 days   Lab Units 03/22/23  1738 03/22/23  1736   BLOOD CULTURE  Received in Microbiology Lab  Culture in Progress  Received in Microbiology Lab  Culture in Progress       ED Treatment:   Medication Administration from 03/22/2023 1704 to 03/22/2023 2026       Date/Time Order Dose Route Action     03/22/2023 1947 EDT vancomycin (VANCOCIN) 1,750 mg in sodium chloride 0 9 % 500 mL IVPB 1,750 mg Intravenous New Bag     03/22/2023 1840 EDT cefepime (MAXIPIME) IVPB (premix in dextrose) 2,000 mg 50 mL 2,000 mg Intravenous New Bag     03/22/2023 1840 EDT sodium chloride 0 9 % bolus 500 mL 500 mL Intravenous New Bag     03/22/2023 2006 EDT valACYclovir (VALTREX) tablet 500 mg 500 mg Oral Given     03/22/2023 2002 EDT vancomycin (VANCOCIN) capsule 125 mg 125 mg Oral Given        Past Medical History:   Diagnosis Date   • Atrial fibrillation (HCC)    • Bladder stone    • C  difficile colitis    • Cellulitis    • CHF (congestive heart failure) ([facility])    • Depression with anxiety    • Disease of thyroid gland    • Diverticulitis    • Enterocolitis    • History of abnormal cervical Pap smear    • Kidney stone    • Lymphedema    • Menopause     Age 53   • Morbid obesity with BMI of 70 and over, adult [facility])    • MRSA (methicillin resistant Staphylococcus aureus)     abdominal wound   • Osteoarthritis    • Phlebitis     left lower leg   • Spondylolysis        Admitting Diagnosis: Cellulitis [L03 90]  Fever [R50 9]  Acute panniculitis [M79 3]  Age/Sex: 71 y o  female  Admission Orders:  SCDs  Wound culture gram stain  Scheduled Medications:  allopurinol, 100 mg, Oral, Daily  cefepime, 2,000 mg, Intravenous, Q12H  DULoxetine, 20 mg, Oral, Daily  [START ON 3/24/2023] furosemide, 40 mg, Oral, Daily  levothyroxine, 125 mcg, Oral, Daily  valACYclovir, 500 mg, Oral, BID  vancomycin oral, 125 mg, Oral, Q12H TEODORO  warfarin, 2 mg, Oral, Q48H      Continuous IV Infusions:     PRN Meds:  acetaminophen, 650 mg, Oral, Q6H PRN  vancomycin, 12 5 mg/kg (Adjusted), Intravenous, Once PRN        IP CONSULT TO CASE MANAGEMENT  IP CONSULT TO PHARMACY  IP CONSULT TO ACUTE CARE SURGERY    Network Utilization Review Department  ATTENTION: Please call with any questions or concerns to [phone] and carefully listen to the prompts so that you are directed to the right person  All voicemails are confidential   [place] all requests for admission clinical reviews, approved or denied determinations and any other requests to dedicated fax number below belonging to the campus where the patient is receiving treatment   List of dedicated fax numbers for the Facilities:  [address] DENIALS (Administrative/Medical Necessity) [phone]   [address] [place] [address] (Maternity/NICU/Pediatrics) [phone]   6 Nehal Andersen [address] [facility] 150 Medical [place] [address] [place] [address] [address] [facility] 134 795 [facility] [phone]

## 2023-03-23 NOTE — CONSULTS
Consultation - General Surgery   Lety Parker 71 y o  female MRN: [id number]  Unit/Bed#: -01 Encounter: [id number]    Assessment:  78-year-old female with recurrent panniculitis  - AVSS  - WBC 15 41 (21 yesterday)  - Lactic 3 0 on admission, has cleared with fluid resuscitation  - Preliminary wound culture 1+ Staph aureus  - Pannus with erythematous, indurated, warm edematous areas of left and right lower areas of abdomen; no palpable areas of fluctuance    PMH - A-fib on Coumadin, lymphedema, CHF, hypothyroidism, and panniculitis    Plan:  - U/S to bilateral lower abdominal quadrants to assess for any underlying areas to drain   - Continue antibiotics per primary team   - AquaK pad to affected areas   - Follow up results of U/S  - Remainder of care per primary team   - seen with Dr Carlo Garrett    History of Present Illness   Chief Complaint:     HPI:  Lety Parker is a 71 y o  female with past medical history significant for A-fib on Coumadin, lymphedema, CHF, hypothyroidism, and panniculitis who initially presented to ED with complaints of fevers, chills and erythema, and purulent drainage of pannus  Patient states that the fever and chills started approximately 24 hours prior to coming to the hospital   She notes that her right side of pannus has had increased drainage recently as she has been sitting up at the side of her bed for longer periods of time to help with lymphedema  Patient reports that she has never had need for incision and drainage of areas of her pannus before  The areas of erythema and inflammation are usually self draining  Review of Systems   Constitutional: Positive for chills (none today) and fever (none today)  Skin: Positive for color change (Crease erythema of bilateral lower abdominal edematous areas of pannus)  All other systems reviewed and are negative        Historical Information   Past Medical History:   Diagnosis Date   • Atrial fibrillation ([address] )    • Bladder stone    • C  difficile colitis    • Cellulitis    • CHF (congestive heart failure) ([facility])    • Depression with anxiety    • Disease of thyroid gland    • Diverticulitis    • Enterocolitis    • History of abnormal cervical Pap smear    • Kidney stone    • Lymphedema    • Menopause     Age 53   • Morbid obesity with BMI of 70 and over, adult ([facility] )    • MRSA (methicillin resistant Staphylococcus aureus)     abdominal wound   • Osteoarthritis    • Phlebitis     left lower leg   • Spondylolysis      Past Surgical History:   Procedure Laterality Date   • APPENDECTOMY     • CARPAL TUNNEL RELEASE     • CHOLECYSTECTOMY     • CYSTOSCOPY      stent   • FOOT SURGERY      bone spur   • KNEE SURGERY     • WISDOM TOOTH EXTRACTION       Social History   Social History     Substance and Sexual Activity   Alcohol Use Not Currently     Social History     Substance and Sexual Activity   Drug Use Not Currently   • Types: Marijuana    Comment: As a teen - As per [employer]      E-Cigarette/Vaping   • E-Cigarette Use Never User      E-Cigarette/Vaping Substances   • Nicotine No    • THC No    • CBD No    • Flavoring No    • Other No    • Unknown No      Social History     Tobacco Use   Smoking Status Former   • Packs/day: 5 00   • Years: 3 00   • Pack years: 15 00   • Types: Cigarettes   • Start date: 1967   • Quit date: 1970   • Years since quittin 7   Smokeless Tobacco Never   Tobacco Comments    5 packs/day until 6 (age 17-19) - As per [employer]      Family History: non-contributory    Meds/Allergies     Allergies   Allergen Reactions   • Augmentin Es-600 [Amoxicillin-Pot Clavulanate] Anaphylaxis and Rash   • Bactrim [Sulfamethoxazole-Trimethoprim] Anaphylaxis   • Penicillins Anaphylaxis and Rash     Tolerates cefepime (21)   • Other Rash, Irritability and Edema     Patient reports significant reaction to the use of Max Orb in the abdominal folds leading to swelling, excoriation, maceration and "weeping of the skin  • Omeprazole GI Intolerance   • Sulfa Antibiotics Hives   • Latex Rash and Edema   • Vitamin C [Ascorbate - Food Allergy] Rash       Objective   First Vitals:   Blood Pressure: 122/83 (03/22/23 1713)  Pulse: 87 (03/22/23 1713)  Temperature: 99 7 °F (37 6 °C) (03/22/23 1713)  Temp Source: Oral (03/22/23 1713)  Respirations: 20 (03/22/23 1713)  Height: 5' 3\" (160 cm) (03/22/23 1713)  Weight - Scale: (!) 195 kg (429 lb 14 4 oz) (03/22/23 1713)  SpO2: 95 % (03/22/23 1713)    Current Vitals:   Blood Pressure: 109/62 (03/23/23 0831)  Pulse: 92 (03/23/23 0831)  Temperature: 99 1 °F (37 3 °C) (03/23/23 0631)  Temp Source: Oral (03/23/23 0631)  Respirations: 18 (03/23/23 0631)  Height: 5' 3\" (160 cm) (03/22/23 1713)  Weight - Scale: (!) 197 kg (434 lb 4 9 oz) (03/23/23 0558)  SpO2: 95 % (03/23/23 0831)      Intake/Output Summary (Last 24 hours) at 3/23/2023 1103  Last data filed at 3/23/2023 0831  Gross per 24 hour   Intake 640 ml   Output --   Net 640 ml       Invasive Devices     Peripheral Intravenous Line  Duration           Peripheral IV 03/22/23 Right Antecubital <1 day                Physical Exam  Constitutional:       General: She is not in acute distress  Appearance: She is obese  HENT:      Head: Normocephalic and atraumatic  Right Ear: External ear normal       Left Ear: External ear normal       Nose: Nose normal    Eyes:      Extraocular Movements: Extraocular movements intact  Cardiovascular:      Rate and Rhythm: Normal rate  Pulmonary:      Breath sounds: Normal breath sounds  Abdominal:      Palpations: Abdomen is soft  Comments: Obese abdomen with large pannus  Left and right outer areas of pannus with erythema, warmth, edema, induration  The lower right side with a focal area of seepage  Lower left side with generalized seepage over the area  The pannus was fairly dry with a few superficial openings     Musculoskeletal:      Right lower leg: Edema (chronic, " no open wounds) present  Left lower leg: Edema (chronic, no open wounds) present  Skin:     Findings: Erythema (Worsening erythema of bilateral lower right and left sides of pannus) present  Neurological:      General: No focal deficit present  Lab Results: I have personally reviewed pertinent lab results  Imaging: I have personally reviewed pertinent reports  EKG, Pathology, and Other Studies: I have personally reviewed pertinent reports  Code Status: Level 1 - Full Code  Advance Directive and Living Will:      Power of :    POLST:      Counseling / Coordination of Care  Total floor / unit time spent today 25 minutes  Greater than 50% of total time was spent with the patient and / or family counseling and / or coordination of care  A description of the counseling / coordination of care: Chart review, history taking, physical examination, discussion with attending physician      Meka Enrique  3/23/2023

## 2023-03-23 NOTE — PROGRESS NOTES
-- Patient: Beto Guzmán  -- MRN: [id number]  -- Aidin Request ID: [id number]  -- Level of care reserved: [address] [facility]  -- Partner Reserved: [address]  [facility] , [facility], [address] [facility] [phone]  -- Clinical needs requested:  -- Geography searched: [phone]  -- Start of Service:  -- Request sent: 12:29pm EDT on 3/23/2023 by Ying Sagastume  -- Partner reserved: 6:39pm EDT on 3/23/2023 by Ying Sagastume  -- Choice list shared: 6:39pm EDT on 3/23/2023 by Ying Sagastume

## 2023-03-23 NOTE — NUTRITION
03/23/23 1522   Biochemical Data,Medical Tests, and Procedures   Biochemical Data/Medical Tests/Procedures Lab values reviewed; Meds reviewed   Nutrition-Focused Physical Exam   Nutrition-Focused Physical Exam Findings RN skin assessment reviewed;Edema   Nutrition-Focused Physical Exam Findings generalized non-pitting edema - lymphedema, wound at lower lateral left back, wound at left lateral thigh, wound at left upper breast, wound at right lateral abdomen   Medical-Related Concerns panniculitis, hypothyroidism, CHF, ambulatory dysfunction, GERD, gout, IBS, BMI 70 and over   Current PO Intake   Current Diet Order Cardiac diet   Current Meal Intake %   Estimated calorie intake compared to estimated need Nutrient needs are met  PES Statement   Weight (3) Overweight/obesity NC-3 3   Related to Energy intake>energy output over time   As evidenced by: BMI   Recommendations/Interventions   Malnutrition/BMI Present Yes   Adult BMI Classifications Morbid Obesity > 70   Summary Presents with fever, nausea, and cellulitis  Per chart review pt is non-ambulatory at home  Food allergy to vitamin C per chart review  Past medical history significant for panniculitis, hypothyroidism, CHF, ambulatory dysfunction, GERD, gout, IBS, BMI 70 and over  Pt reports not having a good appetite at present  She reports goal of weight loss  Pt states she is trying to lose weight to be approved for gastric bypass  She reports having 2 meals daily  Pt states her doctor told her to see a dietitian about increasing her protein intake as her serum protein was low  RD discussed quality protein sources - fish, lean beef, chicken and ways to cook  Pt verbalizes understanding and plans to include additional protein in her diet at home upon discharge  Pt states she is not able to eat green vegetables due to Coumadin medication  RD clarified that she can have green vegetables in a consistent amount   Advised pt to consult her doctor before making diet change so Coumadin can be dosed accordingly  Discussed portion sizing and general healthy diet  She reads nutrition facts labels and has no nutrition questions at this time  Ordered for cardiac diet  Pt states she does not use salt at home  Diet as ordered is appropriate  Multiple wounds noted per flow sheets  Lymphedema present  OP MNT brochure provided  Interventions/Recommendations Continue current diet order;Monitor I & O's   Education Assessment   Education Education initiated/ completed   Patient Nutrition Goals   Goal Comprehend education; Adequate hydration; Adequate intake; Avoid weight gain

## 2023-03-23 NOTE — PROGRESS NOTES
[facility]  Progress Note  Name: Kassidy Lane  MRN: [id number]  Unit/Bed#: -01 I Date of Admission: 3/22/2023   Date of Service: 3/23/2023 I Hospital Day: 1    Assessment/Plan   * Panniculitis  Assessment & Plan  Recurrent panniculitis catered on right pannus with purulent drainage and fever at home  With history of Pseudomonas and MRD from the wound cultures  Bedside ultrasound done in the ED without evidence of abscess  The patient received cefepime and vancomycin in the ED    · Blood cultures pending   · Wound culture +1 growth of Staphylococcus aureus   · Continue with cefepime and Vanco  · Surgery consulted  · Continue local wound care      Elevated lactic acid level  Assessment & Plan  · POA, lactic acid 3 0   · Likely due to acute infection and fevers   · Received IV fluid resuscitation on arrival, repeat lactic acid 1 7    History of Clostridioides difficile infection  Assessment & Plan  · Continue prophylactic oral vancomycin   · Will need to remain on vancomycin 72 hours post antibiotic course     Chronic diastolic congestive heart failure (HCC)  Assessment & Plan  Wt Readings from Last 3 Encounters:   03/22/23 (!) 195 kg (429 lb 14 4 oz)   01/07/23 (!) 193 kg (426 lb)   11/14/22 (!) 193 kg (426 lb)     · Appears euvolemic, weight stable  · 5/8/2022 echocardiogram: LVEF 55% with normal systolic function   · Patient received IV fluid hydration yesterday, home Lasix on hold  · Resume home Lasix dosing of 40 mg daily tomorrow  · Monitor daily weights  · Monitor intake and output  · Low-salt diet        Chronic atrial fibrillation (HCC)  Assessment & Plan  · Rate is controlled without AV blocker  · INR goal 2-3, today INR 3 88  · Hold Coumadin today  · PT/INR in a m      Other specified hypothyroidism  Assessment & Plan  · Continue levothyroxine    Lymphedema of extremity  Assessment & Plan  · Elevate lower extremities in bed      Mild episode of recurrent major depressive disorder ([facility] [address] )  Assessment & Plan  · Continue Cymbalta    Ambulatory dysfunction  Assessment & Plan  · Patient is nonambulatory at baseline, has caregiver at home  · Turn and reposition every 2 hours               VTE Pharmacologic Prophylaxis:   Pharmacologic: Warfarin (Coumadin)  Mechanical VTE Prophylaxis in Place: Yes    Patient Centered Rounds: I have performed bedside rounds with nursing staff today  Discussions with Specialists or Other Care Team Provider: Nursing, case management    Education and Discussions with Family / Patient: Treatment plan discussed with patient who understands the plan as its been explained and is agreeable to the plan as stated  All questions answered to her satisfaction  Time Spent for Care: 45 minutes  More than 50% of total time spent on counseling and coordination of care as described above  Current Length of Stay: 1 day(s)    Current Patient Status: Inpatient   Certification Statement: The patient will continue to require additional inpatient hospital stay due to Need for IV antibiotics, surgical consult    Discharge Plan: Pending hospital course  Patient is bedbound at baseline, at home with caregivers  Patient plans on returning home with her caregivers when stable for discharge  She is currently receiving IV antibiotics and has a surgery consult pending  Code Status: Level 1 - Full Code      Subjective:   Patient resting comfortably in bed  Verbalizing needs  States she is still having some drainage from her pannus  Objective:     Vitals:   Temp (24hrs), Av 9 °F (37 2 °C), Min:98 4 °F (36 9 °C), Max:99 7 °F (37 6 °C)    Temp:  [98 4 °F (36 9 °C)-99 7 °F (37 6 °C)] 98 5 °F (36 9 °C)  HR:  [78-93] 78  Resp:  [16-20] 18  BP: ()/(50-83) 107/50  SpO2:  [91 %-95 %] 92 %  Body mass index is 76 93 kg/m²  Input and Output Summary (last 24 hours):        Intake/Output Summary (Last 24 hours) at 3/23/2023 1601  Last data filed at 3/23/2023 1100  Gross per 24 hour   Intake 1120 ml   Output --   Net 1120 ml       Physical Exam:     Physical Exam  Vitals and nursing note reviewed  Constitutional:       General: She is not in acute distress  Appearance: She is obese  She is not ill-appearing  HENT:      Head: Normocephalic and atraumatic  Nose: Nose normal       Mouth/Throat:      Mouth: Mucous membranes are moist    Cardiovascular:      Rate and Rhythm: Normal rate and regular rhythm  Pulses: Normal pulses  Heart sounds: Normal heart sounds  Pulmonary:      Effort: Pulmonary effort is normal  No respiratory distress  Breath sounds: Normal breath sounds  No stridor  No wheezing or rales  Abdominal:      General: Bowel sounds are normal  There is no distension  Palpations: Abdomen is soft  There is no mass  Tenderness: There is no abdominal tenderness  There is no guarding  Musculoskeletal:         General: Normal range of motion  Cervical back: Normal range of motion and neck supple  Skin:     General: Skin is warm and dry  Capillary Refill: Capillary refill takes less than 2 seconds  Findings: No erythema  Comments: Erythema of pannus   Neurological:      General: No focal deficit present  Mental Status: She is alert and oriented to person, place, and time  Mental status is at baseline  Cranial Nerves: No cranial nerve deficit  Sensory: No sensory deficit  Motor: No weakness  Gait: Gait normal    Psychiatric:         Mood and Affect: Mood normal          Behavior: Behavior normal          Thought Content:  Thought content normal          Judgment: Judgment normal          Additional Data:     Labs:    Results from last 7 days   Lab Units 03/23/23  0432 03/22/23  1736   WBC Thousand/uL 15 41* 21 00*   HEMOGLOBIN g/dL 11 2* 12 5   HEMATOCRIT % 37 7 43 3   PLATELETS Thousands/uL 419* 488*   LYMPHO PCT %  --  4*   MONO PCT %  --  0*   EOS PCT %  --  0     Results from last 7 days   Lab Units 03/23/23  0432 03/22/23  1736   POTASSIUM mmol/L 4 2 5 1   CHLORIDE mmol/L 98 96   CO2 mmol/L 30 26   BUN mg/dL 17 18   CREATININE mg/dL 1 21 1 17   CALCIUM mg/dL 8 5 8 9   ALK PHOS U/L  --  107*   ALT U/L  --  9   AST U/L  --  21     Results from last 7 days   Lab Units 03/23/23  0432   INR  3 88*       * I Have Reviewed All Lab Data Listed Above  * Additional Pertinent Lab Tests Reviewed: [address] Admission Reviewed    Imaging:    Imaging Reports Reviewed Today Include:   Imaging Personally Reviewed by Myself Includes:      Recent Cultures (last 7 days):     Results from last 7 days   Lab Units 03/22/23  1828 03/22/23  1738 03/22/23  1736   BLOOD CULTURE   --  Received in Microbiology Lab  Culture in Progress  Received in Microbiology Lab  Culture in Progress  GRAM STAIN RESULT  Rare Polys*  Rare Gram positive rods*  --   --    WOUND CULTURE  1+ Growth of Staphylococcus aureus*  --   --        Last 24 Hours Medication List:   Current Facility-Administered Medications   Medication Dose Route Frequency Provider Last Rate   • acetaminophen  650 mg Oral Q6H PRN ORACIO Leyva     • allopurinol  100 mg Oral Daily ORACIO Leyva     • cefepime  2,000 mg Intravenous Q12H ORACIO Leyva 2,000 mg (03/23/23 0645)   • DULoxetine  20 mg Oral Daily ORACIO Leyva     • [START ON 3/24/2023] furosemide  40 mg Oral Daily ORACIO Leyva     • levothyroxine  125 mcg Oral Daily ORACIO Leyva     • valACYclovir  500 mg Oral BID ORACIO Leyva     • vancomycin  12 5 mg/kg (Adjusted) Intravenous Once PRN ORACIO Leyva     • vancomycin oral  125 mg Oral Q12H 55 Amber ORACIO Vasquez          Today, Patient Was Seen By: ORACIO Ventura    ** Please Note: Dictation voice to text software may have been used in the creation of this document   **

## 2023-03-23 NOTE — CASE MANAGEMENT
Case Management Assessment & Discharge Planning Note    Patient name Katherine Monk  Location [address] [id number] MRN [id number]  : 1950 Date 3/23/2023       Current Admission Date: 3/22/2023  Current Admission Diagnosis:Panniculitis   Patient Active Problem List    Diagnosis Date Noted   • Elevated lactic acid level 2023   • BMI 70 and over, adult ([facility] ) 2022   • Chronic atrial fibrillation ([facility] ) 2022   • History of Clostridioides difficile infection 2022   • Lymphedema of extremity 2021   • Other specified hypothyroidism 10/03/2020   • Mild episode of recurrent major depressive disorder ([facility] ) 10/03/2020   • Idiopathic chronic 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 10/03/2020   • Primary osteoarthritis involving multiple joints 10/03/2020   • Chronic diastolic congestive heart failure ([facility] ) 10/03/2020   • Panniculitis 10/03/2020   • Gastroesophageal reflux disease without esophagitis 10/03/2020   • Hx MRSA infection 2020   • Functional gait abnormality 2019   • Impaired mobility 2019   • Osteoarthritis of glenohumeral joint 2019   • Left ovarian cyst 2017   • Depression with anxiety 07/15/2017   • Anemia 2016   • Ambulatory dysfunction 2016   • Acute kidney injury superimposed on CKD ([facility] [address] ) 2016   • Adjustment disorder 2013   • Irritable bowel syndrome 2012   • Left atrial enlargement 2012   • Left ventricular hypertrophy 2012   • Carpal tunnel syndrome 2012   • Gout 2012   • Hyperlipidemia 2012   • Symptomatic menopausal or female climacteric states 2012      LOS (days): 1  Geometric Mean LOS (GMLOS) (days): 2 90  Days to GMLOS:1 9     OBJECTIVE:    Risk of Unplanned Readmission Score: 25 44         Current admission status: Inpatient  Referral Reason: Other (d/c planning)    Preferred Pharmacy:    [facility],  [facility] [address] [address]  Phone: [phone] Fax: [phone]    [facility] [place], [place] [address]   [address] [facility] [address]  Phone: [phone] Fax: [phone]    Primary Care Provider: Brigid Edmond MD    Primary Insurance: [facility]  Secondary Insurance: AARP    ASSESSMENT:  Jessica Ayala Proxies    There are no active Health Care Proxies on file  Readmission Root Cause  30 Day Readmission: No    Patient Information  Admitted from[de-identified] Home  Mental Status: Alert  During Assessment patient was accompanied by: Not accompanied during assessment  Assessment information provided by[de-identified] Patient  Primary Caregiver: Other (Comment)  Careg[facility]'s Name[facility]de-identified] [facility] [phone]  Caregiver's Relationship to Patient[de-identified] Other (Specify) (caregiver from [facility])  [address] Number[de-identified] [phone]  [place] of Residence: [address] do you live in?: [address] entry access options  Select all that apply : Ramp  Type of Current Residence: [address] [place] home  Upon entering residence, is there a bedroom on the main floor (no further steps)?: Yes (pt stays on the main floor)  Upon entering residence, is there a bathroom on the main floor (no further steps)?: Yes  In the last 12 months, was there a time when you were not able to pay the mortgage or rent on time?: No  In the last 12 months, how many places have you lived?: 1  In the last 12 months, was there a time when you did not have a steady place to sleep or slept in a shelter (including now)?: No  Homeless/housing insecurity resource given?: N/A  Living Arrangements: Lives Alone (caregvier 12hrs /day x 7 days per week)  Is patient a ?: No    Activities of Daily Living Prior to Admission  Functional Status: Total dependent  Completes ADLs independently?: No  Level of ADL dependence: Assistance  Ambulates independently?: No  Level of ambulatory dependence:  Total Dependent  Does patient use assisted devices?: Yes  Assisted Devices (DME) used: Hospital Bed, Wheelchair  Does patient currently own DME?: Yes  What DME does the patient currently own?: Adelina Clapper Chair/[place], Bedside Commode, Shower Chair (walk in shower, pulse ox,  bp cuff, bed pan)  Does patient have a history of Outpatient Therapy (PT/OT)?: No  Does the patient have a history of Short-Term Rehab?: Yes ([address] view, Chanda Oakley)  Does patient have a history of HHC?: Yes Josiane Deluca)  Does patient currently have Anthonyu [place]?: Yes    Current Home Health Care  Type of Current Home Care Services: Nurse visit  Current Home Health Agency[de-identified] [address] [place] [address] Provider[de-identified] PCP    Patient Information Continued  Income Source: Pension/[employer]  Does patient have prescription coverage?: Yes (mail order & Roberth Berg)  Within the past 12 months, you worried that your food would run out before you got the money to buy more : Never true  Within the past 12 months, the food you bought just didn't last and you didn't have money to get more : Never true  Food insecurity resource given?: N/A  Does patient receive dialysis treatments?: No  Does patient have a history of substance abuse?: No  Does patient have a history of Mental Health Diagnosis?: Yes (depression/anxiety)  Is patient receiving treatment for mental health?: Yes (medication from pcp)  Has patient received inpatient treatment related to mental health in the last 2 years?: No         Means of Transportation  Means of Transport to [facility][de-identified] Other (Comment) (medical transport)  In the past 12 months, has lack of transportation kept you from medical appointments or from getting medications?: No  In the past 12 months, has lack of transportation kept you from meetings, work, or from getting things needed for daily living?: No  Was application for public transport provided?: N/A        DISCHARGE DETAILS:    Discharge planning discussed with[de-identified] patient  Freedom of Choice: Yes  Comments - Freedom of Choice: pt is active with [facility]- referrals sent via [address]         Is the patient interested in [facility] at discharge?: Yes  [address] requested[de-identified] [address] Name[de-identified] [address] Provider[de-identified] PCP  Home Health Services Needed[de-identified] Heart Failure Management, Wound/Ostomy Care (resumption of care)  Homebound Criteria Met[de-identified] Requires Medical Transportation  Supporting Clincal Findings[de-identified] Bed Bound or Wheelchair Bound    DME Referral Provided  Referral made for DME?: No    Other Referral/Resources/Interventions Provided:  Interventions:  Other (Specify), [facility]  Referral Comments: home with [facility]  avs needs to be faxed to [phone] upon d/c - Marly Medina was called at 12:20pm spoke to Sangita Linn he is aware of the they need to be called with the d/c date  as soon as we know- cm LM for Ernesto Collier her cm at [facility] to Adriana at 12:37pm to # [phone]    Would you like to participate in our [address] service program?  : No - Declined    Treatment Team Recommendation: Home with [address] (home with  caregiver & [address] follow up visit- Sree Nagel is needed)

## 2023-03-23 NOTE — PLAN OF CARE
Problem: Potential for Falls  Goal: Patient will remain free of falls  Description: INTERVENTIONS:  - Educate patient/family on patient safety including physical limitations  - Instruct patient to call for assistance with activity   - Consult OT/PT to assist with strengthening/mobility   - Keep Call bell within reach  - Keep bed low and locked with side rails adjusted as appropriate  - Keep care items and personal belongings within reach  - Initiate and maintain comfort rounds  - Make Fall Risk Sign visible to staff  - Offer Toileting every 2 Hours, in advance of need  - Apply yellow socks and bracelet for high fall risk patients  - Consider moving patient to room near nurses station  Outcome: Progressing     Problem: MOBILITY - ADULT  Goal: Maintain or return to baseline ADL function  Description: INTERVENTIONS:  -  Assess patient's ability to carry out ADLs; assess patient's baseline for ADL function and identify physical deficits which impact ability to perform ADLs (bathing, care of mouth/teeth, toileting, grooming, dressing, etc )  - Assess/evaluate cause of self-care deficits   - Assess range of motion  - Assess patient's mobility; develop plan if impaired  - Assess patient's need for assistive devices and provide as appropriate  - Encourage maximum independence but intervene and supervise when necessary  - Involve family in performance of ADLs  - Assess for home care needs following discharge   - Consider OT consult to assist with ADL evaluation and planning for discharge  - Provide patient education as appropriate  Outcome: Progressing  Goal: Maintains/Returns to pre admission functional level  Description: INTERVENTIONS:  - Perform BMAT or MOVE assessment daily    - Set and communicate daily mobility goal to care team and patient/family/caregiver  - Collaborate with rehabilitation services on mobility goals if consulted  - Perform Range of Motion 3 times a day  - Reposition patient every 2 hours    - Dangle patient 3 times a day  - Record patient progress and toleration of activity level   Outcome: Progressing     Problem: PAIN - ADULT  Goal: Verbalizes/displays adequate comfort level or baseline comfort level  Description: Interventions:  - Encourage patient to monitor pain and request assistance  - Assess pain using appropriate pain scale  - Administer analgesics based on type and severity of pain and evaluate response  - Implement non-pharmacological measures as appropriate and evaluate response  - Consider cultural and social influences on pain and pain management  - Notify physician/advanced practitioner if interventions unsuccessful or patient reports new pain  Outcome: Progressing     Problem: INFECTION - ADULT  Goal: Absence or prevention of progression during hospitalization  Description: INTERVENTIONS:  - Assess and monitor for signs and symptoms of infection  - Monitor lab/diagnostic results  - Monitor all insertion sites, i e  indwelling lines, tubes, and drains  - Monitor endotracheal if appropriate and nasal secretions for changes in amount and color  - [place] appropriate cooling/warming therapies per order  - Administer medications as ordered  - Instruct and encourage patient and family to use good hand hygiene technique  - Identify and instruct in appropriate isolation precautions for identified infection/condition  Outcome: Progressing  Goal: Absence of fever/infection during neutropenic period  Description: INTERVENTIONS:  - Monitor WBC    Outcome: Progressing     Problem: SAFETY ADULT  Goal: Patient will remain free of falls  Description: INTERVENTIONS:  - Educate patient/family on patient safety including physical limitations  - Instruct patient to call for assistance with activity   - Consult OT/PT to assist with strengthening/mobility   - Keep Call bell within reach  - Keep bed low and locked with side rails adjusted as appropriate  - Keep care items and personal belongings within reach  - Initiate and maintain comfort rounds  - Make Fall Risk Sign visible to staff  - Offer Toileting every 2 Hours, in advance of need  - Initiate/Maintain bed alarm  - Apply yellow socks and bracelet for high fall risk patients  - Consider moving patient to room near nurses station  Outcome: Progressing  Goal: Maintain or return to baseline ADL function  Description: INTERVENTIONS:  -  Assess patient's ability to carry out ADLs; assess patient's baseline for ADL function and identify physical deficits which impact ability to perform ADLs (bathing, care of mouth/teeth, toileting, grooming, dressing, etc )  - Assess/evaluate cause of self-care deficits   - Assess range of motion  - Assess patient's mobility; develop plan if impaired  - Assess patient's need for assistive devices and provide as appropriate  - Encourage maximum independence but intervene and supervise when necessary  - Involve family in performance of ADLs  - Assess for home care needs following discharge   - Consider OT consult to assist with ADL evaluation and planning for discharge  - Provide patient education as appropriate  Outcome: Progressing  Goal: Maintains/Returns to pre admission functional level  Description: INTERVENTIONS:  - Perform BMAT or MOVE assessment daily    - Set and communicate daily mobility goal to care team and patient/family/caregiver  - Collaborate with rehabilitation services on mobility goals if consulted  - Perform Range of Motion 3 times a day  - Reposition patient every 2 hours    - Dangle patient 3 times a day  - Record patient progress and toleration of activity level   Outcome: Progressing     Problem: DISCHARGE PLANNING  Goal: Discharge to home or other facility with appropriate resources  Description: INTERVENTIONS:  - Identify barriers to discharge w/patient and caregiver  - Arrange for needed discharge resources and transportation as appropriate  - Identify discharge learning needs (meds, wound care, etc )  - Arrange for interpretive services to assist at discharge as needed  - Refer to Case Management Department for coordinating discharge planning if the patient needs post-hospital services based on physician/advanced practitioner order or complex needs related to functional status, cognitive ability, or social support system  Outcome: Progressing     Problem: Knowledge Deficit  Goal: Patient/family/caregiver demonstrates understanding of disease process, treatment plan, medications, and discharge instructions  Description: Complete learning assessment and assess knowledge base    Interventions:  - Provide teaching at level of understanding  - Provide teaching via preferred learning methods  Outcome: Progressing

## 2023-03-23 NOTE — ASSESSMENT & PLAN NOTE
· Rate is controlled without AV blocker  · INR goal 2-3, today INR 3 88  · Hold Coumadin today  · PT/INR in a m

## 2023-03-24 ENCOUNTER — TELEPHONE (OUTPATIENT)
Dept: NEPHROLOGY | Facility: CLINIC | Age: 73
End: 2023-03-24

## 2023-03-24 LAB
ANION GAP SERPL CALCULATED.3IONS-SCNC: 10 MMOL/L (ref 4–13)
BACTERIA WND AEROBE CULT: ABNORMAL
BACTERIA WND AEROBE CULT: ABNORMAL
BASOPHILS # BLD AUTO: 0.05 THOUSANDS/ÂΜL (ref 0–0.1)
BASOPHILS NFR BLD AUTO: 0 % (ref 0–1)
BUN SERPL-MCNC: 20 MG/DL (ref 5–25)
CALCIUM SERPL-MCNC: 8.4 MG/DL (ref 8.4–10.2)
CHLORIDE SERPL-SCNC: 98 MMOL/L (ref 96–108)
CO2 SERPL-SCNC: 24 MMOL/L (ref 21–32)
CREAT SERPL-MCNC: 1.43 MG/DL (ref 0.6–1.3)
EOSINOPHIL # BLD AUTO: 0.21 THOUSAND/ÂΜL (ref 0–0.61)
EOSINOPHIL NFR BLD AUTO: 2 % (ref 0–6)
ERYTHROCYTE [DISTWIDTH] IN BLOOD BY AUTOMATED COUNT: 17.2 % (ref 11.6–15.1)
GFR SERPL CREATININE-BSD FRML MDRD: 36 ML/MIN/1.73SQ M
GLUCOSE SERPL-MCNC: 109 MG/DL (ref 65–140)
GRAM STN SPEC: ABNORMAL
GRAM STN SPEC: ABNORMAL
HCT VFR BLD AUTO: 37 % (ref 34.8–46.1)
HGB BLD-MCNC: 10.9 G/DL (ref 11.5–15.4)
IMM GRANULOCYTES # BLD AUTO: 0.09 THOUSAND/UL (ref 0–0.2)
IMM GRANULOCYTES NFR BLD AUTO: 1 % (ref 0–2)
INR PPP: 2.67 (ref 0.84–1.19)
LYMPHOCYTES # BLD AUTO: 0.94 THOUSANDS/ÂΜL (ref 0.6–4.47)
LYMPHOCYTES NFR BLD AUTO: 8 % (ref 14–44)
MCH RBC QN AUTO: 24.4 PG (ref 26.8–34.3)
MCHC RBC AUTO-ENTMCNC: 29.5 G/DL (ref 31.4–37.4)
MCV RBC AUTO: 83 FL (ref 82–98)
MONOCYTES # BLD AUTO: 0.89 THOUSAND/ÂΜL (ref 0.17–1.22)
MONOCYTES NFR BLD AUTO: 8 % (ref 4–12)
NEUTROPHILS # BLD AUTO: 9.01 THOUSANDS/ÂΜL (ref 1.85–7.62)
NEUTS SEG NFR BLD AUTO: 81 % (ref 43–75)
NRBC BLD AUTO-RTO: 0 /100 WBCS
PLATELET # BLD AUTO: 431 THOUSANDS/UL (ref 149–390)
PMV BLD AUTO: 8.8 FL (ref 8.9–12.7)
POTASSIUM SERPL-SCNC: 4.3 MMOL/L (ref 3.5–5.3)
PROTHROMBIN TIME: 28.3 SECONDS (ref 11.6–14.5)
RBC # BLD AUTO: 4.47 MILLION/UL (ref 3.81–5.12)
SODIUM SERPL-SCNC: 132 MMOL/L (ref 135–147)
VANCOMYCIN TROUGH SERPL-MCNC: 14.7 UG/ML (ref 10–20)
WBC # BLD AUTO: 11.19 THOUSAND/UL (ref 4.31–10.16)

## 2023-03-24 RX ORDER — WARFARIN SODIUM 2 MG/1
2 TABLET ORAL ONCE
Status: DISCONTINUED | OUTPATIENT
Start: 2023-03-24 | End: 2023-03-24

## 2023-03-24 RX ORDER — WARFARIN SODIUM 2 MG/1
1 TABLET ORAL ONCE
Status: COMPLETED | OUTPATIENT
Start: 2023-03-24 | End: 2023-03-24

## 2023-03-24 RX ADMIN — DULOXETINE 20 MG: 20 CAPSULE, DELAYED RELEASE ORAL at 08:37

## 2023-03-24 RX ADMIN — CEFEPIME HYDROCHLORIDE 2000 MG: 2 INJECTION, SOLUTION INTRAVENOUS at 06:01

## 2023-03-24 RX ADMIN — VALACYCLOVIR HYDROCHLORIDE 500 MG: 500 TABLET, FILM COATED ORAL at 17:01

## 2023-03-24 RX ADMIN — ACETAMINOPHEN 650 MG: 325 TABLET ORAL at 16:59

## 2023-03-24 RX ADMIN — CEFEPIME HYDROCHLORIDE 2000 MG: 2 INJECTION, SOLUTION INTRAVENOUS at 18:38

## 2023-03-24 RX ADMIN — VANCOMYCIN HYDROCHLORIDE 1500 MG: 1 INJECTION, POWDER, LYOPHILIZED, FOR SOLUTION INTRAVENOUS at 12:06

## 2023-03-24 RX ADMIN — ALLOPURINOL 100 MG: 100 TABLET ORAL at 08:37

## 2023-03-24 RX ADMIN — LEVOTHYROXINE SODIUM 125 MCG: 25 TABLET ORAL at 05:39

## 2023-03-24 RX ADMIN — VANCOMYCIN HYDROCHLORIDE 125 MG: 125 CAPSULE ORAL at 08:37

## 2023-03-24 RX ADMIN — VANCOMYCIN HYDROCHLORIDE 125 MG: 125 CAPSULE ORAL at 20:19

## 2023-03-24 RX ADMIN — WARFARIN SODIUM 1 MG: 2 TABLET ORAL at 17:01

## 2023-03-24 RX ADMIN — VALACYCLOVIR HYDROCHLORIDE 500 MG: 500 TABLET, FILM COATED ORAL at 08:37

## 2023-03-24 NOTE — NURSING NOTE
Pt noted to desat while sleeping  Pt would drop to 80s, educated pt on use of o2, but pt refused  Pt states she is aware of oxygen dropping at night and says it is normal for her at home  Pt noted to drop 2nd time, RN and charge in room to educate on the need to wear o2 for adequate perfusion  Pt adamant about not wearing oxygen  Pt dropped for a 3rd time with o2 sat 67%, pt agreeable to wear nasal cannula while sleeping  Increased large amount of serous yellow drainage noted for left side wound  Incontinence pad saturated and leaking onto floor  Pt states this happens from time to time  Pt sides noted to be against side rails of bed  Pt would benefit from bariatric bed

## 2023-03-24 NOTE — ASSESSMENT & PLAN NOTE
Recurrent panniculitis catered on right pannus with purulent drainage and fever at home  With history of Pseudomonas and MRD from the wound cultures  Bedside ultrasound done in the ED without evidence of abscess  The patient received cefepime and vancomycin in the ED    · Blood cultures negative to date  · Wound culture +1 growth of Staphylococcus aureus oxacillin susceptible  · 3/23/2023 ultrasound abdomen: Findings suggest cellulitis without organized fluid collection at the areas of concern    · Continue with IV cefepime and Vanco for now based on sensitivity report  · Surgery consulted, prescient recommendations  · Continue local wound care per wound care nurse orders

## 2023-03-24 NOTE — ASSESSMENT & PLAN NOTE
· POA, lactic acid 3 0   · Likely due to acute infection and fevers   · Resolved with fluid hydration

## 2023-03-24 NOTE — WOUND OSTOMY CARE
Consult Note - Wound   Sherri Combs 71 y o  female MRN: [id number]  Unit/Bed#: -01 Encounter: [id number]      History and Present Illness:  71year old female patient admitted with panniculitis  Wound care consulted for skin breakdown  She has a history of morbid obesity with a BMI of 76 93, chronic lymphedema  recurrent cellulitis, C-diff colitis, AFIB, CHF and hypothyroidism  Wound care nurse has seen this patient on prior admissions for skin breakdown  Patient states she prefers not to have a bariatric bed, she states she is able to turn in the current bed  Assessment Findings:   She is awake, alert and oriented  She is in bed for assessment  She is able to roll onto her side with minimal assistance  She is able to feed herself, needs assistance with hygiene care  She is pleasant and cooperative  1  Bilateral heels, buttocks and sacrum intact  2  Right abdominal fold with linear partial thickness wound bed, no drainage  Patient requests that her home treatment be followed during inpatient, she refuses Maxorb, Maxorb Ag, Maxorb rope and interdry  She prefers Bacitracin to be applied to the left breast fold and abdominal fold  3  Beefy red partial thickness wound to the right lateral-posterior thigh, patient prefers to have Triamcinolone applied, she has her home supply at the bedside    Skin and Wound Care Plan:   1  Apply skin nourishing cream to the skin daily  2  Elevate heels off of bed with pillows to offload  3  Apply Hydraguard to b/l heels, buttocks and sacrum BID and PRN  4  Turn and reposition patient Q2 hours  5  Cleanse beneath breast and abdominal folds with soap and water, pat dry thoroughly  Apply Bacitracin to left breast fold and place ABD pad  Apply Triamcinolone to the right posterior thigh    Patient requests home treatment be resumed during admission    Wounds:  Wound 03/22/23 Back Lateral;Left;Lower (Active)   Wound Image   03/22/23 2033   Wound Description Beefy red 03/23/23 2000   Alison-wound Assessment Clean;Dry;Pink;Scaly;Fragile 03/23/23 1200   Drainage Amount None 03/22/23 2100   Dressing Open to air 03/23/23 1200       Wound 03/22/23 Thigh Left;Lateral (Active)   Wound Image   03/23/23 1326   Wound Description Beefy red;Drainage;Yellow 03/23/23 2000   Alison-wound Assessment Erythema;Pink;Scaly 03/23/23 1200   Drainage Amount Small 03/23/23 2000   Drainage Description Yellow;Serous 03/23/23 2000   Treatments Cleansed 03/23/23 2000   Dressing Other (Comment) 03/23/23 2000   Patient Tolerance Tolerated well 03/23/23 2000       Wound 03/22/23 MASD Breast Left;Upper (Active)   Wound Image   03/23/23 1327   Wound Description Beefy red;Fragile 03/23/23 2000   Alison-wound Assessment [place] 03/23/23 1200   Wound Depth (cm) 0 1 cm 03/23/23 1327   Treatments Cleansed 03/23/23 2000   Dressing Moisture barrier 03/23/23 1200   Patient Tolerance Tolerated well 03/23/23 2000       Wound 03/22/23 Abdomen Right;Lateral (Active)   Wound Image   03/23/23 1331   Wound Description Beefy red 03/23/23 1331   Alison-wound Assessment Erythema;[place] 03/23/23 1200   Wound Length (cm) 0 4 cm 03/23/23 1331   Wound Width (cm) 2 5 cm 03/23/23 1331   Wound Depth (cm) 0 1 cm 03/23/23 1331   Wound Surface Area (cm^2) 1 cm^2 03/23/23 1331   Wound Volume (cm^3) 0 1 cm^3 03/23/23 1331   Calculated Wound Volume (cm^3) 0 1 cm^3 03/23/23 1331   Drainage Amount Scant 03/23/23 1331   Drainage Description Serosanguineous 03/23/23 1331   Non-staged Wound Description Partial thickness 03/23/23 1331   Treatments Cleansed 03/23/23 1331   Dressing ABD 03/23/23 1331   Patient Tolerance Tolerated well 03/23/23 1331     Reviewed plan of care with primary RN [facility]  Recommendations written as orders  Wound care team to follow weekly while admitted  Questions or concerns [facility] Nurse    Garrick ELIZONDON, RN, Margareth Smith

## 2023-03-24 NOTE — DISCHARGE INSTR - OTHER ORDERS
Skin and Wound Care Plan:   1  Apply skin nourishing cream to the skin daily  2  Elevate heels off of bed with pillows to offload  3  Apply Hydraguard to b/l heels, buttocks and sacrum BID and PRN  4  Turn and reposition patient Q2 hours  5  Cleanse beneath breast and abdominal folds with soap and water, pat dry thoroughly  Apply Bacitracin to left breast fold and place ABD pad  Apply Triamcinolone to the right posterior thigh    Patient requests home treatment be resumed during admission

## 2023-03-24 NOTE — TELEPHONE ENCOUNTER
[facility] called to make us aware patient was currently admitted to the [facility]

## 2023-03-24 NOTE — PROGRESS NOTES
-- Patient: Carol Be  -- MRN: [id number]  -- Aidin Request ID: [id number]  -- Level of care reserved: [address] [facility]  -- Partner Reserved: [address]  [facility] , [facility], [address] [facility] [phone]  -- Clinical needs requested:  -- Geography searched: [id number]  -- Start of Service:  -- Request sent: 12:29pm EDT on 3/23/2023 by Sharifa Hayward  -- Partner reserved: 6:39pm EDT on 3/23/2023 by Sharifa Hayward  -- Choice list shared: 6:39pm EDT on 3/23/2023 by Sharifa Hayward

## 2023-03-24 NOTE — CASE MANAGEMENT
Case Management Discharge Planning Note    Patient name Katherine Monk  Location [address] [phone]/-[id number] MRN [id number]  : 1950 Date 3/24/2023       Current Admission Date: 3/22/2023  Current Admission Diagnosis:Panniculitis   Patient Active Problem List    Diagnosis Date Noted   • Elevated lactic acid level 2023   • BMI 70 and over, adult ([facility] ) 2022   • Chronic atrial fibrillation ([facility] ) 2022   • History of Clostridioides difficile infection 2022   • Lymphedema of extremity 2021   • Other specified hypothyroidism 10/03/2020   • Mild episode of recurrent major depressive disorder ([facility] ) 10/03/2020   • Idiopathic chronic 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 10/03/2020   • Primary osteoarthritis involving multiple joints 10/03/2020   • Chronic diastolic congestive heart failure ([facility] ) 10/03/2020   • Panniculitis 10/03/2020   • Gastroesophageal reflux disease without esophagitis 10/03/2020   • Hx MRSA infection 2020   • Functional gait abnormality 2019   • Impaired mobility 2019   • Osteoarthritis of glenohumeral joint 2019   • Left ovarian cyst 2017   • Depression with anxiety 07/15/2017   • Anemia 2016   • Ambulatory dysfunction 2016   • Acute kidney injury superimposed on CKD ([facility] ) 2016   • Adjustment disorder 2013   • Irritable bowel syndrome 2012   • Left atrial enlargement 2012   • Left ventricular hypertrophy 2012   • Carpal tunnel syndrome 2012   • Gout 2012   • Hyperlipidemia 2012   • Symptomatic menopausal or female climacteric states 2012      LOS (days): 2  Geometric Mean LOS (GMLOS) (days): 2 90  Days to GMLOS:0 9     OBJECTIVE:  Risk of Unplanned Readmission Score: 27 29         Current admission status: Inpatient   Preferred Pharmacy:    [facility]  [facility] [address]  [place] [address]  Phone: [phone] Fax: [address] [facility]  [address] [facility]  [address] [address]  Phone: [phone] Fax: [phone]    Primary Care Provider: Guerrero East MD    Primary Insurance: [facility] REP  Secondary Insurance: AARP    DISCHARGE DETAILS:    Discharge planning discussed with[de-identified] patient and Beatriz Turner was called at 17:56pm   Freedom of Choice: Yes  Comments - Freedom of Choice: pt is active with [facility] and caregiver from [address]  CM contacted family/caregiver?: Yes             Contacts  Patient Contacts: Kenyatta Weaver  Relationship to Patient[de-identified] Family (nephew)  Contact Method: Phone  Phone Number: [phone] for him to call to discuss d/c plan  Reason/Outcome: Discharge [address]         Is the patient interested in [facility] at discharge?: Yes         Other Referral/Resources/Interventions Provided:  Interventions: [facility], Other (Specify)  Referral Comments: Esteban Stinson is aware of planned d/c for tomorrow, cm called Tamara Sepulveda at 11:51 am I made them aware of irene d/c planned for tomorrow and I was told they would call me back if the care giver is available- cm received a call at 13:20pm and I was told that Fay would be available at 2pm and she has a key to get into the home- s transport set up [address] [place] [address] sent for transport and no auth is needed    Would you like to participate in our [address] service program?  : No - Declined    Treatment Team Recommendation: Home with [address] [facility] (home with [address] [facility] caregiver - 2pm [address] [facility])     Transport at Discharge : [facility]  Dispatcher Contacted: Yes  Number/Name of Dispatcher: [phone]  [facility]  [facility]  ETA of Transport (Date): 03/24/23  ETA of Transport (Time): 1400      avs needs to be faxed to [phone]        IMM Given (Date):: 03/24/23  IMM Given to[de-identified] Patient

## 2023-03-24 NOTE — ASSESSMENT & PLAN NOTE
· Patient is nonambulatory at baseline  · Has caregivers at home  · Turn and reposition every 2 hours

## 2023-03-24 NOTE — PLAN OF CARE
Problem: Potential for Falls  Goal: Patient will remain free of falls  Description: INTERVENTIONS:  - Educate patient/family on patient safety including physical limitations  - Instruct patient to call for assistance with activity   - Consult OT/PT to assist with strengthening/mobility   - Keep Call bell within reach  - Keep bed low and locked with side rails adjusted as appropriate  - Keep care items and personal belongings within reach  - Initiate and maintain comfort rounds  - Make Fall Risk Sign visible to staff  - Offer Toileting every 2 Hours, in advance of need  - Initiate/Maintain bed alarm  - Obtain necessary fall risk management equipment  - Apply yellow socks and bracelet for high fall risk patients  - Consider moving patient to room near nurses station  Outcome: Progressing     Problem: MOBILITY - ADULT  Goal: Maintain or return to baseline ADL function  Description: INTERVENTIONS:  - Educate patient/family on patient safety including physical limitations  - Instruct patient to call for assistance with activity   - Consult OT/PT to assist with strengthening/mobility   - Keep Call bell within reach  - Keep bed low and locked with side rails adjusted as appropriate  - Keep care items and personal belongings within reach  - Initiate and maintain comfort rounds  - Make Fall Risk Sign visible to staff  - Offer Toileting every 2 Hours, in advance of need  - Initiate/Maintain bed alarm  - Obtain necessary fall risk management equipment  - Apply yellow socks and bracelet for high fall risk patients  - Consider moving patient to room near nurses station  Outcome: Progressing  Goal: Maintains/Returns to pre admission functional level  Description: INTERVENTIONS:  - Perform BMAT or MOVE assessment daily    - Set and communicate daily mobility goal to care team and patient/family/caregiver     - Collaborate with rehabilitation services on mobility goals if consulted  - Record patient progress and toleration of activity level   Outcome: Progressing     Problem: PAIN - ADULT  Goal: Verbalizes/displays adequate comfort level or baseline comfort level  Description: Interventions:  - Encourage patient to monitor pain and request assistance  - Assess pain using appropriate pain scale  - Administer analgesics based on type and severity of pain and evaluate response  - Implement non-pharmacological measures as appropriate and evaluate response  - Consider cultural and social influences on pain and pain management  - Notify physician/advanced practitioner if interventions unsuccessful or patient reports new pain  Outcome: Progressing     Problem: INFECTION - ADULT  Goal: Absence or prevention of progression during hospitalization  Description: INTERVENTIONS:  - Assess and monitor for signs and symptoms of infection  - Monitor lab/diagnostic results  - Monitor all insertion sites, i e  indwelling lines, tubes, and drains  - Monitor endotracheal if appropriate and nasal secretions for changes in amount and color  - Ferdinand appropriate cooling/warming therapies per order  - Administer medications as ordered  - Instruct and encourage patient and family to use good hand hygiene technique  - Identify and instruct in appropriate isolation precautions for identified infection/condition  Outcome: Progressing     Problem: SAFETY ADULT  Goal: Patient will remain free of falls  Description: INTERVENTIONS:  - Educate patient/family on patient safety including physical limitations  - Instruct patient to call for assistance with activity   - Consult OT/PT to assist with strengthening/mobility   - Keep Call bell within reach  - Keep bed low and locked with side rails adjusted as appropriate  - Keep care items and personal belongings within reach  - Initiate and maintain comfort rounds  - Make Fall Risk Sign visible to staff  - Offer Toileting every 2 Hours, in advance of need  - Initiate/Maintain bed alarm  - Obtain necessary fall risk management equipment  - Apply yellow socks and bracelet for high fall risk patients  - Consider moving patient to room near nurses station  Outcome: Progressing  Goal: Maintain or return to baseline ADL function  Description: INTERVENTIONS:  - Educate patient/family on patient safety including physical limitations  - Instruct patient to call for assistance with activity   - Consult OT/PT to assist with strengthening/mobility   - Keep Call bell within reach  - Keep bed low and locked with side rails adjusted as appropriate  - Keep care items and personal belongings within reach  - Initiate and maintain comfort rounds  - Make Fall Risk Sign visible to staff  - Offer Toileting every 2 Hours, in advance of need  - Initiate/Maintain bed alarm  - Obtain necessary fall risk management equipment  - Apply yellow socks and bracelet for high fall risk patients  - Consider moving patient to room near nurses station  Outcome: Progressing  Goal: Maintains/Returns to pre admission functional level  Description: INTERVENTIONS:  - Perform BMAT or MOVE assessment daily    - Set and communicate daily mobility goal to care team and patient/family/caregiver     - Collaborate with rehabilitation services on mobility goals if consulted  - Record patient progress and toleration of activity level   Outcome: Progressing     Problem: DISCHARGE PLANNING  Goal: Discharge to home or other facility with appropriate resources  Description: INTERVENTIONS:  - Identify barriers to discharge w/patient and caregiver  - Arrange for needed discharge resources and transportation as appropriate  - Identify discharge learning needs (meds, wound care, etc )  - Arrange for interpretive services to assist at discharge as needed  - Refer to Case Management Department for coordinating discharge planning if the patient needs post-hospital services based on physician/advanced practitioner order or complex needs related to functional status, cognitive ability, or social support system  Outcome: Progressing     Problem: Knowledge Deficit  Goal: Patient/family/caregiver demonstrates understanding of disease process, treatment plan, medications, and discharge instructions  Description: Complete learning assessment and assess knowledge base    Interventions:  - Provide teaching at level of understanding  - Provide teaching via preferred learning methods  Outcome: Progressing     Problem: Prexisting or High Potential for Compromised Skin Integrity  Goal: Skin integrity is maintained or improved  Description: INTERVENTIONS:  - Identify patients at risk for skin breakdown  - Assess and monitor skin integrity  - Assess and monitor nutrition and hydration status  - Monitor labs   - Assess for incontinence   - Turn and reposition patient  - Assist with mobility/ambulation  - Relieve pressure over bony prominences  - Avoid friction and shearing  - Provide appropriate hygiene as needed including keeping skin clean and dry  - Evaluate need for skin moisturizer/barrier cream  - Collaborate with interdisciplinary team   - Patient/family teaching  - Consider wound care consult   Outcome: Progressing

## 2023-03-24 NOTE — PROGRESS NOTES
"Progress Note - General Surgery   Khari Mason 71 y o  female MRN: [id number]  Unit/Bed#: -01 Encounter: [id number]    Assessment:  71year old female with PMH significant for obesity, lymphedema, Afib, hypothyroidism presents with recurrent panniculitis   · Afebrile, vitals stable   · WBC 11 19 (15 41)  · Hgb 10 9  · Na 132  · Cr 1 43  · Abdomen U/S: \"Findings suggesting cellulitis without organized fluid collection at the areas of clinical concern  \"  · Left side of pannus actively draining    Plan:  · No acute surgical intervention indicated at this time as there is no area of collection  · Continue IV cefepime and vancomycin   · Appreciate wound care nurse recommendations  · Rest of medical management per SLIM  · To be evaluated by attending at bedside     Subjective/Objective   Subjective: Patient states overnight that the L side opened up and drained a large amount of fluid overnight  Objective:   Blood pressure 97/56, pulse 76, temperature 98 1 °F (36 7 °C), resp  rate 18, height 5' 3\" (1 6 m), weight (!) 196 kg (432 lb 15 7 oz), SpO2 93 %  ,Body mass index is 76 7 kg/m²  Intake/Output Summary (Last 24 hours) at 3/24/2023 3079  Last data filed at 3/24/2023 0700  Gross per 24 hour   Intake 960 ml   Output --   Net 960 ml       Invasive Devices     Peripheral Intravenous Line  Duration           Peripheral IV 03/22/23 Right Antecubital 1 day                Physical Exam  Vitals reviewed  Constitutional:       General: She is not in acute distress  Appearance: She is obese  HENT:      Head: Normocephalic and atraumatic  Cardiovascular:      Rate and Rhythm: Normal rate and regular rhythm  Pulmonary:      Effort: Pulmonary effort is normal  No respiratory distress  Abdominal:      General: There is no distension  Palpations: Abdomen is soft  Tenderness: There is no abdominal tenderness  There is no guarding  Comments: Abdomen obese with large pannus   Lateral " areas of pannus indurated, warm, and erythematous  No palpable areas of fluctuance  The L lateral pannus weeping clear, green fluid  Musculoskeletal:      Comments: Chronic b/l leg edema   Skin:     General: Skin is warm and dry  Neurological:      General: No focal deficit present  Mental Status: She is alert  Mental status is at baseline  Psychiatric:         Mood and Affect: Mood normal          Behavior: Behavior normal          Lab, Imaging and other studies:  I have personally reviewed pertinent lab results      CBC:   Lab Results   Component Value Date    WBC 11 19 (H) 03/24/2023    HGB 10 9 (L) 03/24/2023    HCT 37 0 03/24/2023    MCV 83 03/24/2023     (H) 03/24/2023    MCH 24 4 (L) 03/24/2023    MCHC 29 5 (L) 03/24/2023    RDW 17 2 (H) 03/24/2023    MPV 8 8 (L) 03/24/2023    NRBC 0 03/24/2023     CMP:   Lab Results   Component Value Date    SODIUM 132 (L) 03/24/2023    K 4 3 03/24/2023    CL 98 03/24/2023    CO2 24 03/24/2023    BUN 20 03/24/2023    CREATININE 1 43 (H) 03/24/2023    CALCIUM 8 4 03/24/2023    EGFR 36 03/24/2023     VTE Pharmacologic Prophylaxis: Warfarin (Coumadin)  VTE Mechanical Prophylaxis: sequential compression device    Tiana Wood PA-C

## 2023-03-24 NOTE — PROGRESS NOTES
Jarek Meyer is a 71 y o  female who is currently ordered Vancomycin IV with management by the Pharmacy Consult service  Relevant clinical data and objective / subjective history reviewed  Vancomycin Assessment:  Indication and Goal AUC/Trough: Soft tissue (goal -600, trough >10), -600, trough >10  Clinical Status: stable  Micro:     Renal Function:  SCr: 1 43 mg/dL  CrCl: 62 7 mL/min  Renal replacement: Not on dialysis  Days of Therapy: 3  Current Dose: pulse dosing (1500 mg IV when trough < 15)  Vancomycin Plan:  New Dosinmg IV given today (trough 14 7)    Next Level: 3/25 with AM labs  Renal Function Monitoring: Daily BMP and [place] will continue to follow closely for s/sx of nephrotoxicity, infusion reactions and appropriateness of therapy  BMP and CBC will be ordered per protocol  We will continue to follow the patient’s culture results and clinical progress daily      Kay Myrick, Pharmacist

## 2023-03-24 NOTE — CASE MANAGEMENT
Chidi Morales 51 received request for transport authorization from Care Manager     Type of transport: BLS  Date of transport: 03/24/2023  Transport company: [facility]FFIL[facility] WITH [facility]   NPI: [id number]  Start Location: [address]   End Location: [facility] [place] [address] initiated by contacting: [facility] Via: [facility] Portal

## 2023-03-24 NOTE — ASSESSMENT & PLAN NOTE
Wt Readings from Last 3 Encounters:   03/24/23 (!) 196 kg (432 lb 15 7 oz)   01/07/23 (!) 193 kg (426 lb)   11/14/22 (!) 193 kg (426 lb)     · Appears euvolemic, weight stable between 196-197 kg last 2 days  · 5/8/2022 echocardiogram: LVEF 55% with normal systolic function   · Patient received IV fluid hydration 3/22/2023 at which time Lasix was placed on hold  · Will continue to hold Lasix for now given slight increase in creatinine overnight in euvolemic state  · Reevaluate Lasix regimen in a m  · Continue to monitor daily weights  · Monitor intake and output  · Low-salt diet  · BMP a m

## 2023-03-24 NOTE — ASSESSMENT & PLAN NOTE
· Rate is controlled without AV blocker  · INR goal 2-3  · Coumadin held yesterday due to INR 3 88  · Repeat INR today 2 67  · Coumadin 1 mg orally today  · PT/INR in a m

## 2023-03-24 NOTE — PROGRESS NOTES
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 with purulent drainage and fever at home  With history of Pseudomonas and MRD from the wound cultures  Bedside ultrasound done in the ED without evidence of abscess  The patient received cefepime and vancomycin in the ED    · Blood cultures negative to date  · Wound culture +1 growth of Staphylococcus aureus oxacillin susceptible  · 3/23/2023 ultrasound abdomen: Findings suggest cellulitis without organized fluid collection at the areas of concern  · Continue with IV cefepime and Vanco for now based on sensitivity report  · Surgery consulted, prescient recommendations  · Continue local wound care per wound care nurse orders      Elevated lactic acid level  Assessment & Plan  · POA, lactic acid 3 0   · Likely due to acute infection and fevers   · Resolved with fluid hydration    History of Clostridioides difficile infection  Assessment & Plan  · Continue prophylactic oral vancomycin   · Will need to remain on vancomycin 72 hours post antibiotic course     Chronic diastolic congestive heart failure (HCC)  Assessment & Plan  Wt Readings from Last 3 Encounters:   03/24/23 (!) 196 kg (432 lb 15 7 oz)   01/07/23 (!) 193 kg (426 lb)   11/14/22 (!) 193 kg (426 lb)     · Appears euvolemic, weight stable between 196-197 kg last 2 days  · 5/8/2022 echocardiogram: LVEF 55% with normal systolic function   · Patient received IV fluid hydration 3/22/2023 at which time Lasix was placed on hold  · Will continue to hold Lasix for now given slight increase in creatinine overnight in euvolemic state  · Reevaluate Lasix regimen in a m    · Continue to monitor daily weights  · Monitor intake and output  · Low-salt diet  · BMP a m         Chronic atrial fibrillation (HCC)  Assessment & Plan  · Rate is controlled without AV blocker  · INR goal 2-3  · Coumadin held yesterday due to INR 3 88  · Repeat INR today 2 67  · Coumadin 1 mg orally today  · PT/INR in a m  Other specified hypothyroidism  Assessment & Plan  · Continue levothyroxine    Lymphedema of extremity  Assessment & Plan  · Elevate lower extremities in bed      Mild episode of recurrent major depressive disorder (HCC)  Assessment & Plan  · Continue Cymbalta    Ambulatory dysfunction  Assessment & Plan  · Patient is nonambulatory at baseline  · Has caregivers at home  · Turn and reposition every 2 hours               VTE Pharmacologic Prophylaxis:   Pharmacologic: Warfarin (Coumadin)  Mechanical VTE Prophylaxis in Place: Yes    Patient Centered Rounds: I have performed bedside rounds with nursing staff today  Discussions with Specialists or Other Care Team Provider: Surgery, nursing, case management    Education and Discussions with Family / Patient: Treatment plan discussed with patient who understands the plan as it has been explained and is agreeable to the plan as stated  All questions answered to her satisfaction  Patient will update her caretakers  Time Spent for Care: 45 minutes  More than 50% of total time spent on counseling and coordination of care as described above  Current Length of Stay: 2 day(s)    Current Patient Status: Inpatient   Certification Statement: The patient will continue to require additional inpatient hospital stay due to The need for IV antibiotics    Discharge Plan: Patient is normally bedbound at home, has caretakers  Patient is receiving IV antibiotics at this time  We will continue same as patient having increased drainage from left pannus overnight  Will trend fever overnight, repeat procalcitonin in a m  and trend WBC  Plan on de-escalating antibiotics in a m  pending lab results    Hopeful discharge home within the next 24-48 hours     Code Status: Level 1 - Full Code      Subjective:   Patient resting comfortably in bed  Reports increased drainage from left pannus wound overnight  Objective:     Vitals:   Temp (24hrs), Av 4 °F (36 9 °C), Min:97 7 °F (36 5 °C), Max:99 5 °F (37 5 °C)    Temp:  [97 7 °F (36 5 °C)-99 5 °F (37 5 °C)] 97 7 °F (36 5 °C)  HR:  [70-81] 81  Resp:  [16-18] 18  BP: ()/(56-67) 111/58  SpO2:  [80 %-96 %] 95 %  Body mass index is 76 7 kg/m²  Input and Output Summary (last 24 hours): Intake/Output Summary (Last 24 hours) at 3/24/2023 1501  Last data filed at 3/24/2023 1100  Gross per 24 hour   Intake 720 ml   Output --   Net 720 ml       Physical Exam:     Physical Exam  Vitals and nursing note reviewed  Constitutional:       General: She is not in acute distress  Appearance: She is obese  She is not ill-appearing  HENT:      Head: Normocephalic and atraumatic  Nose: Nose normal       Mouth/Throat:      Mouth: Mucous membranes are moist    Cardiovascular:      Rate and Rhythm: Normal rate and regular rhythm  Pulses: Normal pulses  Heart sounds: Normal heart sounds  Pulmonary:      Effort: Pulmonary effort is normal  No respiratory distress  Breath sounds: Normal breath sounds  No stridor  No wheezing or rales  Comments: Lungs clear with decreased sounds in bilateral bases  Abdominal:      General: Bowel sounds are normal  There is no distension  Palpations: Abdomen is soft  There is no mass  Tenderness: There is no abdominal tenderness  There is no guarding  Musculoskeletal:         General: Normal range of motion  Cervical back: Normal range of motion and neck supple  Skin:     General: Skin is warm and dry  Capillary Refill: Capillary refill takes less than 2 seconds  Neurological:      General: No focal deficit present  Mental Status: She is alert and oriented to person, place, and time  Mental status is at baseline  Cranial Nerves: No cranial nerve deficit  Sensory: No sensory deficit  Motor: No weakness  Comments: Patient is bedbound and nonambulatory at baseline   Psychiatric:         Mood and Affect: Mood normal          Behavior: Behavior normal          Thought Content: Thought content normal          Judgment: Judgment normal          Additional Data:     Labs:    Results from last 7 days   Lab Units 03/24/23  0430   WBC Thousand/uL 11 19*   HEMOGLOBIN g/dL 10 9*   HEMATOCRIT % 37 0   PLATELETS Thousands/uL 431*   NEUTROS PCT % 81*   LYMPHS PCT % 8*   MONOS PCT % 8   EOS PCT % 2     Results from last 7 days   Lab Units 03/24/23  0430 03/23/23  0432 03/22/23  1736   POTASSIUM mmol/L 4 3   < > 5 1   CHLORIDE mmol/L 98   < > 96   CO2 mmol/L 24   < > 26   BUN mg/dL 20   < > 18   CREATININE mg/dL 1 43*   < > 1 17   CALCIUM mg/dL 8 4   < > 8 9   ALK PHOS U/L  --   --  107*   ALT U/L  --   --  9   AST U/L  --   --  21    < > = values in this interval not displayed  Results from last 7 days   Lab Units 03/24/23  0430   INR  2 67*       * I Have Reviewed All Lab Data Listed Above  * Additional Pertinent Lab Tests Reviewed: [facility] Admission Reviewed    Imaging:    Imaging Reports Reviewed Today Include: Ultrasound abdomen  Imaging Personally Reviewed by Myself Includes:     Recent Cultures (last 7 days):     Results from last 7 days   Lab Units 03/22/23  1828 03/22/23  1738 03/22/23  1736   BLOOD CULTURE   --  No Growth at 24 hrs  No Growth at 24 hrs     GRAM STAIN RESULT  Rare Polys*  Rare Gram positive rods*  --   --    WOUND CULTURE  1+ Growth of Staphylococcus aureus*  1+ Growth of  --   --        Last 24 Hours Medication List:   Current Facility-Administered Medications   Medication Dose Route Frequency Provider Last Rate   • acetaminophen  650 mg Oral Q6H PRN ORACIO Morales     • allopurinol  100 mg Oral Daily ORACIO Morales     • cefepime  2,000 mg Intravenous Q12H Melva Needs, CRNP 2,000 mg (03/24/23 0601)   • DULoxetine  20 mg Oral Daily Melva Needs, CRNP     • levothyroxine  125 mcg Oral Daily Melva Needs, CRNP     • valACYclovir  500 mg Oral BID Melva Needs, CRNP     • vancomycin  12 5 mg/kg (Adjusted) Intravenous Once PRN Melva Needs, CRNP     • vancomycin oral  125 mg Oral Q12H [address] Melva Needs, CRNP     • warfarin  1 mg Oral Once ORACIO Last          Today, Patient Was Seen By: ORACIO Jones    ** Please Note: Dictation voice to text software may have been used in the creation of this document   **

## 2023-03-25 LAB
ANION GAP SERPL CALCULATED.3IONS-SCNC: 5 MMOL/L (ref 4–13)
BASOPHILS # BLD AUTO: 0.04 THOUSANDS/ÂΜL (ref 0–0.1)
BASOPHILS NFR BLD AUTO: 0 % (ref 0–1)
BUN SERPL-MCNC: 22 MG/DL (ref 5–25)
CALCIUM SERPL-MCNC: 8.4 MG/DL (ref 8.4–10.2)
CHLORIDE SERPL-SCNC: 99 MMOL/L (ref 96–108)
CO2 SERPL-SCNC: 30 MMOL/L (ref 21–32)
CREAT SERPL-MCNC: 1.26 MG/DL (ref 0.6–1.3)
EOSINOPHIL # BLD AUTO: 0.39 THOUSAND/ÂΜL (ref 0–0.61)
EOSINOPHIL NFR BLD AUTO: 3 % (ref 0–6)
ERYTHROCYTE [DISTWIDTH] IN BLOOD BY AUTOMATED COUNT: 17.2 % (ref 11.6–15.1)
GFR SERPL CREATININE-BSD FRML MDRD: 42 ML/MIN/1.73SQ M
GLUCOSE SERPL-MCNC: 116 MG/DL (ref 65–140)
HCT VFR BLD AUTO: 36.1 % (ref 34.8–46.1)
HGB BLD-MCNC: 10.6 G/DL (ref 11.5–15.4)
IMM GRANULOCYTES # BLD AUTO: 0.07 THOUSAND/UL (ref 0–0.2)
IMM GRANULOCYTES NFR BLD AUTO: 1 % (ref 0–2)
INR PPP: 2.3 (ref 0.84–1.19)
LYMPHOCYTES # BLD AUTO: 0.94 THOUSANDS/ÂΜL (ref 0.6–4.47)
LYMPHOCYTES NFR BLD AUTO: 8 % (ref 14–44)
MCH RBC QN AUTO: 24.1 PG (ref 26.8–34.3)
MCHC RBC AUTO-ENTMCNC: 29.4 G/DL (ref 31.4–37.4)
MCV RBC AUTO: 82 FL (ref 82–98)
MONOCYTES # BLD AUTO: 0.77 THOUSAND/ÂΜL (ref 0.17–1.22)
MONOCYTES NFR BLD AUTO: 6 % (ref 4–12)
NEUTROPHILS # BLD AUTO: 10 THOUSANDS/ÂΜL (ref 1.85–7.62)
NEUTS SEG NFR BLD AUTO: 82 % (ref 43–75)
NRBC BLD AUTO-RTO: 0 /100 WBCS
PLATELET # BLD AUTO: 440 THOUSANDS/UL (ref 149–390)
PMV BLD AUTO: 8.4 FL (ref 8.9–12.7)
POTASSIUM SERPL-SCNC: 4 MMOL/L (ref 3.5–5.3)
PROCALCITONIN SERPL-MCNC: 0.16 NG/ML
PROTHROMBIN TIME: 25.2 SECONDS (ref 11.6–14.5)
RBC # BLD AUTO: 4.39 MILLION/UL (ref 3.81–5.12)
SODIUM SERPL-SCNC: 134 MMOL/L (ref 135–147)
VANCOMYCIN TROUGH SERPL-MCNC: 19.2 UG/ML (ref 10–20)
WBC # BLD AUTO: 12.21 THOUSAND/UL (ref 4.31–10.16)

## 2023-03-25 RX ORDER — WARFARIN SODIUM 2 MG/1
1 TABLET ORAL
Status: COMPLETED | OUTPATIENT
Start: 2023-03-25 | End: 2023-03-25

## 2023-03-25 RX ORDER — TRIAMCINOLONE ACETONIDE 1 MG/G
CREAM TOPICAL 2 TIMES DAILY
Status: DISCONTINUED | OUTPATIENT
Start: 2023-03-25 | End: 2023-03-27 | Stop reason: HOSPADM

## 2023-03-25 RX ADMIN — LEVOTHYROXINE SODIUM 125 MCG: 25 TABLET ORAL at 05:27

## 2023-03-25 RX ADMIN — VANCOMYCIN HYDROCHLORIDE 125 MG: 125 CAPSULE ORAL at 20:47

## 2023-03-25 RX ADMIN — VALACYCLOVIR HYDROCHLORIDE 500 MG: 500 TABLET, FILM COATED ORAL at 18:35

## 2023-03-25 RX ADMIN — ALLOPURINOL 100 MG: 100 TABLET ORAL at 08:53

## 2023-03-25 RX ADMIN — DULOXETINE 20 MG: 20 CAPSULE, DELAYED RELEASE ORAL at 08:53

## 2023-03-25 RX ADMIN — MUPIROCIN: 20 OINTMENT TOPICAL at 15:56

## 2023-03-25 RX ADMIN — WARFARIN SODIUM 1 MG: 2 TABLET ORAL at 18:35

## 2023-03-25 RX ADMIN — VALACYCLOVIR HYDROCHLORIDE 500 MG: 500 TABLET, FILM COATED ORAL at 08:53

## 2023-03-25 RX ADMIN — VANCOMYCIN HYDROCHLORIDE 125 MG: 125 CAPSULE ORAL at 08:53

## 2023-03-25 RX ADMIN — TRIAMCINOLONE ACETONIDE: 1 CREAM TOPICAL at 18:35

## 2023-03-25 RX ADMIN — CEFEPIME HYDROCHLORIDE 2000 MG: 2 INJECTION, SOLUTION INTRAVENOUS at 06:00

## 2023-03-25 RX ADMIN — MUPIROCIN 1 APPLICATION.: 20 OINTMENT TOPICAL at 20:47

## 2023-03-25 RX ADMIN — CEFEPIME HYDROCHLORIDE 2000 MG: 2 INJECTION, SOLUTION INTRAVENOUS at 18:36

## 2023-03-25 NOTE — PLAN OF CARE
Problem: Potential for Falls  Goal: Patient will remain free of falls  Description: INTERVENTIONS:  - Educate patient/family on patient safety including physical limitations  - Instruct patient to call for assistance with activity   - Consult OT/PT to assist with strengthening/mobility   - Keep Call bell within reach  - Keep bed low and locked with side rails adjusted as appropriate  - Keep care items and personal belongings within reach  - Initiate and maintain comfort rounds  - Make Fall Risk Sign visible to staff  - Offer Toileting every 2 Hours, in advance of need  - Initiate/Maintain bed alarm  - Obtain necessary fall risk management equipment  - Apply yellow socks and bracelet for high fall risk patients  - Consider moving patient to room near nurses station  3/25/2023 1656 by Carine Montejo RN  Outcome: Progressing  3/25/2023 1656 by Carine Montejo RN  Outcome: Progressing     Problem: MOBILITY - ADULT  Goal: Maintain or return to baseline ADL function  Description: INTERVENTIONS:  - Educate patient/family on patient safety including physical limitations  - Instruct patient to call for assistance with activity   - Consult OT/PT to assist with strengthening/mobility   - Keep Call bell within reach  - Keep bed low and locked with side rails adjusted as appropriate  - Keep care items and personal belongings within reach  - Initiate and maintain comfort rounds  - Make Fall Risk Sign visible to staff  - Offer Toileting every 2 Hours, in advance of need  - Initiate/Maintain bed alarm  - Obtain necessary fall risk management equipment  - Apply yellow socks and bracelet for high fall risk patients  - Consider moving patient to room near nurses station  Outcome: Progressing     Problem: PAIN - ADULT  Goal: Verbalizes/displays adequate comfort level or baseline comfort level  Description: Interventions:  - Encourage patient to monitor pain and request assistance  - Assess pain using appropriate pain scale  - Administer analgesics based on type and severity of pain and evaluate response  - Implement non-pharmacological measures as appropriate and evaluate response  - Consider cultural and social influences on pain and pain management  - Notify physician/advanced practitioner if interventions unsuccessful or patient reports new pain  Outcome: Progressing     Problem: INFECTION - ADULT  Goal: Absence or prevention of progression during hospitalization  Description: INTERVENTIONS:  - Assess and monitor for signs and symptoms of infection  - Monitor lab/diagnostic results  - Monitor all insertion sites, i e  indwelling lines, tubes, and drains  - Monitor endotracheal if appropriate and nasal secretions for changes in amount and color  - [place] appropriate cooling/warming therapies per order  - Administer medications as ordered  - Instruct and encourage patient and family to use good hand hygiene technique  - Identify and instruct in appropriate isolation precautions for identified infection/condition  Outcome: Progressing     Problem: SAFETY ADULT  Goal: Patient will remain free of falls  Description: INTERVENTIONS:  - Educate patient/family on patient safety including physical limitations  - Instruct patient to call for assistance with activity   - Consult OT/PT to assist with strengthening/mobility   - Keep Call bell within reach  - Keep bed low and locked with side rails adjusted as appropriate  - Keep care items and personal belongings within reach  - Initiate and maintain comfort rounds  - Make Fall Risk Sign visible to staff  - Offer Toileting every 2 Hours, in advance of need  - Initiate/Maintain bed alarm  - Obtain necessary fall risk management equipment  - Apply yellow socks and bracelet for high fall risk patients  - Consider moving patient to room near nurses station  3/25/2023 1656 by Chichi Vargas RN  Outcome: Progressing  3/25/2023 1656 by Chichi Vargas RN  Outcome: Progressing     Problem: SAFETY ADULT  Goal: Maintain or return to baseline ADL function  Description: INTERVENTIONS:  - Educate patient/family on patient safety including physical limitations  - Instruct patient to call for assistance with activity   - Consult OT/PT to assist with strengthening/mobility   - Keep Call bell within reach  - Keep bed low and locked with side rails adjusted as appropriate  - Keep care items and personal belongings within reach  - Initiate and maintain comfort rounds  - Make Fall Risk Sign visible to staff  - Offer Toileting every 2 Hours, in advance of need  - Initiate/Maintain bed alarm  - Obtain necessary fall risk management equipment  - Apply yellow socks and bracelet for high fall risk patients  - Consider moving patient to room near nurses station  Outcome: Progressing     Problem: DISCHARGE PLANNING  Goal: Discharge to home or other facility with appropriate resources  Description: INTERVENTIONS:  - Identify barriers to discharge w/patient and caregiver  - Arrange for needed discharge resources and transportation as appropriate  - Identify discharge learning needs (meds, wound care, etc )  - Arrange for interpretive services to assist at discharge as needed  - Refer to Case Management Department for coordinating discharge planning if the patient needs post-hospital services based on physician/advanced practitioner order or complex needs related to functional status, cognitive ability, or social support system  Outcome: Progressing     Problem: Knowledge Deficit  Goal: Patient/family/caregiver demonstrates understanding of disease process, treatment plan, medications, and discharge instructions  Description: Complete learning assessment and assess knowledge base    Interventions:  - Provide teaching at level of understanding  - Provide teaching via preferred learning methods  Outcome: Progressing     Problem: Prexisting or High Potential for Compromised Skin Integrity  Goal: Skin integrity is maintained or improved  Description: INTERVENTIONS:  - Identify patients at risk for skin breakdown  - Assess and monitor skin integrity  - Assess and monitor nutrition and hydration status  - Monitor labs   - Assess for incontinence   - Turn and reposition patient  - Assist with mobility/ambulation  - Relieve pressure over bony prominences  - Avoid friction and shearing  - Provide appropriate hygiene as needed including keeping skin clean and dry  - Evaluate need for skin moisturizer/barrier cream  - Collaborate with interdisciplinary team   - Patient/family teaching  - Consider wound care consult   Outcome: Progressing

## 2023-03-25 NOTE — PLAN OF CARE
Problem: Potential for Falls  Goal: Patient will remain free of falls  Description: INTERVENTIONS:  - Educate patient/family on patient safety including physical limitations  - Instruct patient to call for assistance with activity   - Consult OT/PT to assist with strengthening/mobility   - Keep Call bell within reach  - Keep bed low and locked with side rails adjusted as appropriate  - Keep care items and personal belongings within reach  - Initiate and maintain comfort rounds  - Make Fall Risk Sign visible to staff  - Offer Toileting every 2 Hours, in advance of need  - Initiate/Maintain bed alarm  - Obtain necessary fall risk management equipment  - Apply yellow socks and bracelet for high fall risk patients  - Consider moving patient to room near nurses station  Outcome: Progressing     Problem: MOBILITY - ADULT  Goal: Maintain or return to baseline ADL function  Description: INTERVENTIONS:  - Educate patient/family on patient safety including physical limitations  - Instruct patient to call for assistance with activity   - Consult OT/PT to assist with strengthening/mobility   - Keep Call bell within reach  - Keep bed low and locked with side rails adjusted as appropriate  - Keep care items and personal belongings within reach  - Initiate and maintain comfort rounds  - Make Fall Risk Sign visible to staff  - Offer Toileting every 2 Hours, in advance of need  - Initiate/Maintain bed alarm  - Obtain necessary fall risk management equipment  - Apply yellow socks and bracelet for high fall risk patients  - Consider moving patient to room near nurses station  Outcome: Progressing  Goal: Maintains/Returns to pre admission functional level  Description: INTERVENTIONS:  - Perform BMAT or MOVE assessment daily    - Set and communicate daily mobility goal to care team and patient/family/caregiver  - Collaborate with rehabilitation services on mobility goals if consulted  - Perform Range of Motion three times a day    - Reposition patient every two hours    - Dangle patient three times a day  - Stand patient three times a day  - Ambulate patient three times a day  - Out of bed to chair three times a day   - Out of bed for meals three times a day  - Out of bed for toileting  - Record patient progress and toleration of activity level   Outcome: Progressing     Problem: PAIN - ADULT  Goal: Verbalizes/displays adequate comfort level or baseline comfort level  Description: Interventions:  - Encourage patient to monitor pain and request assistance  - Assess pain using appropriate pain scale  - Administer analgesics based on type and severity of pain and evaluate response  - Implement non-pharmacological measures as appropriate and evaluate response  - Consider cultural and social influences on pain and pain management  - Notify physician/advanced practitioner if interventions unsuccessful or patient reports new pain  Outcome: Progressing     Problem: INFECTION - ADULT  Goal: Absence or prevention of progression during hospitalization  Description: INTERVENTIONS:  - Assess and monitor for signs and symptoms of infection  - Monitor lab/diagnostic results  - Monitor all insertion sites, i e  indwelling lines, tubes, and drains  - Monitor endotracheal if appropriate and nasal secretions for changes in amount and color  - [place] appropriate cooling/warming therapies per order  - Administer medications as ordered  - Instruct and encourage patient and family to use good hand hygiene technique  - Identify and instruct in appropriate isolation precautions for identified infection/condition  Outcome: Progressing     Problem: SAFETY ADULT  Goal: Patient will remain free of falls  Description: INTERVENTIONS:  - Educate patient/family on patient safety including physical limitations  - Instruct patient to call for assistance with activity   - Consult OT/PT to assist with strengthening/mobility   - Keep Call bell within reach  - Keep bed low and locked with side rails adjusted as appropriate  - Keep care items and personal belongings within reach  - Initiate and maintain comfort rounds  - Make Fall Risk Sign visible to staff  - Offer Toileting every 2 Hours, in advance of need  - Initiate/Maintain bed alarm  - Obtain necessary fall risk management equipment  - Apply yellow socks and bracelet for high fall risk patients  - Consider moving patient to room near nurses station  Outcome: Progressing  Goal: Maintain or return to baseline ADL function  Description: INTERVENTIONS:  - Educate patient/family on patient safety including physical limitations  - Instruct patient to call for assistance with activity   - Consult OT/PT to assist with strengthening/mobility   - Keep Call bell within reach  - Keep bed low and locked with side rails adjusted as appropriate  - Keep care items and personal belongings within reach  - Initiate and maintain comfort rounds  - Make Fall Risk Sign visible to staff  - Offer Toileting every 2 Hours, in advance of need  - Initiate/Maintain bed alarm  - Obtain necessary fall risk management equipment  - Apply yellow socks and bracelet for high fall risk patients  - Consider moving patient to room near nurses station  Outcome: Progressing  Goal: Maintains/Returns to pre admission functional level  Description: INTERVENTIONS:  - Perform BMAT or MOVE assessment daily    - Set and communicate daily mobility goal to care team and patient/family/caregiver  - Collaborate with rehabilitation services on mobility goals if consulted  - Perform Range of Motion three times a day  - Reposition patient every two hours    - Dangle patient three times a day  - Stand patient three times a day  - Ambulate patient three times a day  - Out of bed to chair three times a day   - Out of bed for meals three times a day  - Out of bed for toileting  - Record patient progress and toleration of activity level   Outcome: Progressing     Problem: DISCHARGE PLANNING  Goal: Discharge to home or other facility with appropriate resources  Description: INTERVENTIONS:  - Identify barriers to discharge w/patient and caregiver  - Arrange for needed discharge resources and transportation as appropriate  - Identify discharge learning needs (meds, wound care, etc )  - Arrange for interpretive services to assist at discharge as needed  - Refer to Case Management Department for coordinating discharge planning if the patient needs post-hospital services based on physician/advanced practitioner order or complex needs related to functional status, cognitive ability, or social support system  Outcome: Progressing     Problem: Knowledge Deficit  Goal: Patient/family/caregiver demonstrates understanding of disease process, treatment plan, medications, and discharge instructions  Description: Complete learning assessment and assess knowledge base    Interventions:  - Provide teaching at level of understanding  - Provide teaching via preferred learning methods  Outcome: Progressing     Problem: Prexisting or High Potential for Compromised Skin Integrity  Goal: Skin integrity is maintained or improved  Description: INTERVENTIONS:  - Identify patients at risk for skin breakdown  - Assess and monitor skin integrity  - Assess and monitor nutrition and hydration status  - Monitor labs   - Assess for incontinence   - Turn and reposition patient  - Assist with mobility/ambulation  - Relieve pressure over bony prominences  - Avoid friction and shearing  - Provide appropriate hygiene as needed including keeping skin clean and dry  - Evaluate need for skin moisturizer/barrier cream  - Collaborate with interdisciplinary team   - Patient/family teaching  - Consider wound care consult   Outcome: Progressing

## 2023-03-25 NOTE — PROGRESS NOTES
"Progress Note - General Surgery   Khari Mason 71 y o  female MRN: [id number]  Unit/Bed#: -01 Encounter: [id number]    ASSESSMENT:  71year old female with PMH significant for obesity, lymphedema, Afib, hypothyroidism presents HD#3 with recurrent panniculitis   · AF, VSS  · WBC 12 21 (trend: 11 19, 15 41, 21)  · Hgb stable 10 6, 12 5 on admission, likely 2/2 IVF  · Cr 1 26 from 1 43  · Mild hyponatremia 134  · Procal WNL  · Wound cx + Staph aureus  · Blood cx NGTD  · UO: 299cc  · On IV Cefepime/Vanc  · L side pannus still draining some yellow/green tinged fluid    PLAN:  · Continue medical management with IV abx  · No indication for surgical intervention at this time; no fluctuant areas requiring drainage  · Aqua K pad to cellulitic areas PRN  · Analgesia PRN  · Medical management per primary       SUBJECTIVE:  No f/c  No acute complaints  Tolerating diet  Pain well controlled  Still with drainage to L lateral abdominal wall  OBJECTIVE:   Vitals:  Blood pressure 109/63, pulse 73, temperature 98 3 °F (36 8 °C), temperature source Oral, resp  rate 18, height 5' 3\" (1 6 m), weight (!) 197 kg (434 lb 4 9 oz), SpO2 94 %  ,Body mass index is 76 93 kg/m²  I/Os:    Intake/Output Summary (Last 24 hours) at 3/25/2023 0724  Last data filed at 3/25/2023 0101  Gross per 24 hour   Intake 240 ml   Output 299 ml   Net -59 ml       Lines/Drains:  Invasive Devices     Peripheral Intravenous Line  Duration           Peripheral IV 03/22/23 Right Antecubital 2 days                Physical Exam  Vitals reviewed  Constitutional:       General: She is not in acute distress  Appearance: She is obese  She is not toxic-appearing  Cardiovascular:      Rate and Rhythm: Normal rate  Pulmonary:      Effort: Pulmonary effort is normal    Abdominal:      General: There is no distension  Palpations: Abdomen is soft  Tenderness: There is no abdominal tenderness  There is no guarding or rebound     Skin:     " Comments: R and L lower lateral abdominal wall erythema, edema, warmth  L>R  No drainage from R    L with small amount yellow/green tinged weeping drainage  No areas of fluctuance  Neurological:      Mental Status: She is alert  Diagnostics:  I have personally reviewed pertinent lab results  US abdomen limited    Result Date: 3/24/2023  Impression: Findings suggesting cellulitis without organized fluid collection at the areas of clinical concern   Workstation performed: [id number]     Recent Results (from the past 36 hour(s))   Protime-INR    Collection Time: 03/24/23  4:30 AM   Result Value Ref Range    Protime 28 3 (H) 11 6 - 14 5 seconds    INR 2 67 (H) 0 84 - 1 19   Vancomycin, trough    Collection Time: 03/24/23  4:30 AM   Result Value Ref Range    Vancomycin Tr 14 7 10 0 - 20 0 ug/mL   CBC and differential    Collection Time: 03/24/23  4:30 AM   Result Value Ref Range    WBC 11 19 (H) 4 31 - 10 16 Thousand/uL    RBC 4 47 3 81 - 5 12 Million/uL    Hemoglobin 10 9 (L) 11 5 - 15 4 g/dL    Hematocrit 37 0 34 8 - 46 1 %    MCV 83 82 - 98 fL    MCH 24 4 (L) 26 8 - 34 3 pg    MCHC 29 5 (L) 31 4 - 37 4 g/dL    RDW 17 2 (H) 11 6 - 15 1 %    MPV 8 8 (L) 8 9 - 12 7 fL    Platelets 264 (H) 647 - 390 Thousands/uL    nRBC 0 /100 WBCs    Neutrophils Relative 81 (H) 43 - 75 %    Immat GRANS % 1 0 - 2 %    Lymphocytes Relative 8 (L) 14 - 44 %    Monocytes Relative 8 4 - 12 %    Eosinophils Relative 2 0 - 6 %    Basophils Relative 0 0 - 1 %    Neutrophils Absolute 9 01 (H) 1 85 - 7 62 Thousands/µL    Immature Grans Absolute 0 09 0 00 - 0 20 Thousand/uL    Lymphocytes Absolute 0 94 0 60 - 4 47 Thousands/µL    Monocytes Absolute 0 89 0 17 - 1 22 Thousand/µL    Eosinophils Absolute 0 21 0 00 - 0 61 Thousand/µL    Basophils Absolute 0 05 0 00 - 0 10 Thousands/µL   Basic metabolic panel    Collection Time: 03/24/23  4:30 AM   Result Value Ref Range    Sodium 132 (L) 135 - 147 mmol/L    Potassium 4 3 3 5 - 5 3 mmol/L Chloride 98 96 - 108 mmol/L    CO2 24 21 - 32 mmol/L    ANION GAP 10 4 - 13 mmol/L    BUN 20 5 - 25 mg/dL    Creatinine 1 43 (H) 0 60 - 1 30 mg/dL    Glucose 109 65 - 140 mg/dL    Calcium 8 4 8 4 - 10 2 mg/dL    eGFR 36 ml/min/1 73sq m   Protime-INR    Collection Time: 03/25/23  5:36 AM   Result Value Ref Range    Protime 25 2 (H) 11 6 - 14 5 seconds    INR 2 30 (H) 0 84 - 1 19   Vancomycin, trough    Collection Time: 03/25/23  5:36 AM   Result Value Ref Range    Vancomycin Tr 19 2 10 0 - 20 0 ug/mL   CBC and differential    Collection Time: 03/25/23  5:36 AM   Result Value Ref Range    WBC 12 21 (H) 4 31 - 10 16 Thousand/uL    RBC 4 39 3 81 - 5 12 Million/uL    Hemoglobin 10 6 (L) 11 5 - 15 4 g/dL    Hematocrit 36 1 34 8 - 46 1 %    MCV 82 82 - 98 fL    MCH 24 1 (L) 26 8 - 34 3 pg    MCHC 29 4 (L) 31 4 - 37 4 g/dL    RDW 17 2 (H) 11 6 - 15 1 %    MPV 8 4 (L) 8 9 - 12 7 fL    Platelets 164 (H) 880 - 390 Thousands/uL    nRBC 0 /100 WBCs    Neutrophils Relative 82 (H) 43 - 75 %    Immat GRANS % 1 0 - 2 %    Lymphocytes Relative 8 (L) 14 - 44 %    Monocytes Relative 6 4 - 12 %    Eosinophils Relative 3 0 - 6 %    Basophils Relative 0 0 - 1 %    Neutrophils Absolute 10 00 (H) 1 85 - 7 62 Thousands/µL    Immature Grans Absolute 0 07 0 00 - 0 20 Thousand/uL    Lymphocytes Absolute 0 94 0 60 - 4 47 Thousands/µL    Monocytes Absolute 0 77 0 17 - 1 22 Thousand/µL    Eosinophils Absolute 0 39 0 00 - 0 61 Thousand/µL    Basophils Absolute 0 04 0 00 - 0 10 Thousands/µL   Basic metabolic panel    Collection Time: 03/25/23  5:36 AM   Result Value Ref Range    Sodium 134 (L) 135 - 147 mmol/L    Potassium 4 0 3 5 - 5 3 mmol/L    Chloride 99 96 - 108 mmol/L    CO2 30 21 - 32 mmol/L    ANION GAP 5 4 - 13 mmol/L    BUN 22 5 - 25 mg/dL    Creatinine 1 26 0 60 - 1 30 mg/dL    Glucose 116 65 - 140 mg/dL    Calcium 8 4 8 4 - 10 2 mg/dL    eGFR 42 ml/min/1 73sq m   Procalcitonin    Collection Time: 03/25/23  5:36 AM   Result Value Ref Range    Procalcitonin 0 16 <=0 25 ng/ml       Current Medications:  Scheduled Meds:  Current Facility-Administered Medications   Medication Dose Route Frequency Provider Last Rate   • acetaminophen  650 mg Oral Q6H PRN ORACIO Devlin     • allopurinol  100 mg Oral Daily ORACIO Devlin     • cefepime  2,000 mg Intravenous Q12H ORACIO Devlin 2,000 mg (03/25/23 0600)   • DULoxetine  20 mg Oral Daily ORACIO Devlin     • levothyroxine  125 mcg Oral Daily ORACIO Devlin     • valACYclovir  500 mg Oral BID ORACIO Devlin     • vancomycin  12 5 mg/kg (Adjusted) Intravenous Once PRN ORACIO Devlin     • vancomycin oral  125 mg Oral Q12H [facility] ORACIO Devlin       Continuous Infusions:   PRN Meds:  •  acetaminophen  •  vancomycin      Juan C Shoemaker PA-C  3/25/2023

## 2023-03-25 NOTE — PROGRESS NOTES
[facility]  Progress Note  Name: Galdino Bowling  MRN: [id number]  Unit/Bed#: -01 I Date of Admission: 3/22/2023   Date of Service: 3/25/2023 I Hospital Day: 3    Assessment/Plan   * Panniculitis  Assessment & Plan  Recurrent panniculitis catered on right pannus with purulent drainage and fever at home  With history of Pseudomonas and MRD from the wound cultures  Bedside ultrasound done in the ED without evidence of abscess  The patient received cefepime and vancomycin in the ED    · Blood cultures negative to date  · Wound culture +1 growth of Staphylococcus aureus oxacillin susceptible  · 3/23/2023 ultrasound abdomen: Findings suggest cellulitis without organized fluid collection at the areas of concern  · Continue with IV cefepime and Vanco, sensitivity report reviewed  · Surgery consulted, prescient recommendations  · Continue local wound care per wound care nurse orders      Elevated lactic acid level  Assessment & Plan  · POA, lactic acid 3 0   · Likely due to acute infection and fevers   · Resolved with fluid hydration    History of Clostridioides difficile infection  Assessment & Plan  · Continue prophylactic oral vancomycin   · Will need to remain on vancomycin 72 hours post antibiotic course     Chronic diastolic congestive heart failure (HCC)  Assessment & Plan  Wt Readings from Last 3 Encounters:   03/25/23 (!) 197 kg (434 lb 4 9 oz)   01/07/23 (!) 193 kg (426 lb)   11/14/22 (!) 193 kg (426 lb)     · Appears euvolemic, weight stable between 196-197 kg   · 5/8/2022 echocardiogram: LVEF 55% with normal systolic function   · Patient received IV fluid hydration 3/22/2023 at which time Lasix was placed on hold  · Creatinine continues to improve, 1 26 today   · Will continue to hold Lasix for now given patient appears euvolemic and creatinine improving  · Reevaluate Lasix regimen in a m    · Continue to monitor daily weights  · Monitor intake and output  · Low-salt diet  · BMP a m  Chronic atrial fibrillation (HCC)  Assessment & Plan  · Rate is controlled without AV blocker  · INR goal 2-3  · Coumadin held 3/23/2023 due to INR  · Repeat INR today 2 67, received Coumadin 1 mg orally  · INR 2 30 today  · Continue Coumadin 1 mg today  · PT/INR in a m  Other specified hypothyroidism  Assessment & Plan  · Continue levothyroxine    Lymphedema of extremity  Assessment & Plan  · Elevate lower extremities in bed      Mild episode of recurrent major depressive disorder (HCC)  Assessment & Plan  · No signs of depression at this time   · continue Cymbalta    Ambulatory dysfunction  Assessment & Plan  · Patient is nonambulatory at baseline  · Has caregivers at home  · Turn and reposition every 2 hours               VTE Pharmacologic Prophylaxis:   Pharmacologic: Warfarin (Coumadin)  Mechanical VTE Prophylaxis in Place: Yes    Patient Centered Rounds: I have performed bedside rounds with nursing staff today  Discussions with Specialists or Other Care Team Provider: Pharmacy, nursing, case management    Education and Discussions with Family / Patient: Treatment plan discussed with patient who understands the plan as it has been explained and is agreeable to the plan as stated  All questions answered to her satisfaction  Time Spent for Care: 40 minutes  More than 50% of total time spent on counseling and coordination of care as described above  Current Length of Stay: 3 day(s)    Current Patient Status: Inpatient   Certification Statement: The patient will continue to require additional inpatient hospital stay due to Need for IV antibiotics    Discharge Plan: Patient is bedbound at baseline and has home health care and caregivers  She is still having significant yellow-green drainage from the left side of her pannus  We will continue IV antibiotics for now  Will consult ID    Hopeful discharge within the next 48-72 hours    Code Status: Level 1 - Full "Code      Subjective:   Patient complains of increased drainage from left pannus  States Kenia Morales are constantly changing out the pads and is running on the floor\"    Objective:     Vitals:   Temp (24hrs), Av 4 °F (36 9 °C), Min:97 7 °F (36 5 °C), Max:99 1 °F (37 3 °C)    Temp:  [97 7 °F (36 5 °C)-99 1 °F (37 3 °C)] 98 3 °F (36 8 °C)  HR:  [72-81] 73  Resp:  [18-20] 18  BP: (109-133)/(58-71) 109/63  SpO2:  [94 %-95 %] 94 %  Body mass index is 76 93 kg/m²  Input and Output Summary (last 24 hours): Intake/Output Summary (Last 24 hours) at 3/25/2023 1311  Last data filed at 3/25/2023 1100  Gross per 24 hour   Intake --   Output 449 ml   Net -449 ml       Physical Exam:     Physical Exam  Vitals and nursing note reviewed  Constitutional:       General: She is not in acute distress  Appearance: Normal appearance  She is obese  She is not ill-appearing  HENT:      Head: Normocephalic and atraumatic  Nose: Nose normal       Mouth/Throat:      Mouth: Mucous membranes are moist    Cardiovascular:      Rate and Rhythm: Normal rate and regular rhythm  Pulses: Normal pulses  Heart sounds: Normal heart sounds  Pulmonary:      Effort: Pulmonary effort is normal  No respiratory distress  Breath sounds: Normal breath sounds  No stridor  No wheezing or rales  Abdominal:      General: Bowel sounds are normal  There is no distension  Palpations: Abdomen is soft  There is no mass  Tenderness: There is no abdominal tenderness  There is no guarding  Musculoskeletal:         General: Normal range of motion  Cervical back: Normal range of motion and neck supple  Skin:     General: Skin is warm and dry  Capillary Refill: Capillary refill takes less than 2 seconds  Findings: Erythema present  Comments: Erythema of pannus, left-sided pannus draining yellowish-green drainage   Neurological:      General: No focal deficit present        Mental Status: She is alert and " oriented to person, place, and time  Mental status is at baseline  Cranial Nerves: No cranial nerve deficit  Sensory: No sensory deficit  Motor: No weakness  Gait: Gait normal    Psychiatric:         Mood and Affect: Mood normal          Behavior: Behavior normal          Thought Content: Thought content normal          Judgment: Judgment normal          Additional Data:     Labs:    Results from last 7 days   Lab Units 03/25/23  0536   WBC Thousand/uL 12 21*   HEMOGLOBIN g/dL 10 6*   HEMATOCRIT % 36 1   PLATELETS Thousands/uL 440*   NEUTROS PCT % 82*   LYMPHS PCT % 8*   MONOS PCT % 6   EOS PCT % 3     Results from last 7 days   Lab Units 03/25/23  0536 03/23/23  0432 03/22/23  1736   POTASSIUM mmol/L 4 0   < > 5 1   CHLORIDE mmol/L 99   < > 96   CO2 mmol/L 30   < > 26   BUN mg/dL 22   < > 18   CREATININE mg/dL 1 26   < > 1 17   CALCIUM mg/dL 8 4   < > 8 9   ALK PHOS U/L  --   --  107*   ALT U/L  --   --  9   AST U/L  --   --  21    < > = values in this interval not displayed  Results from last 7 days   Lab Units 03/25/23  0536   INR  2 30*       * I Have Reviewed All Lab Data Listed Above  * Additional Pertinent Lab Tests Reviewed: [facility] Admission Reviewed    Imaging:    Imaging Reports Reviewed Today Include: Ultrasound abdomen  Imaging Personally Reviewed by Myself Includes:      Recent Cultures (last 7 days):     Results from last 7 days   Lab Units 03/22/23  1828 03/22/23  1738 03/22/23  1736   BLOOD CULTURE   --  No Growth at 48 hrs  No Growth at 48 hrs     GRAM STAIN RESULT  Rare Polys*  Rare Gram positive rods*  --   --    WOUND CULTURE  1+ Growth of Staphylococcus aureus*  1+ Growth of  --   --        Last 24 Hours Medication List:   Current Facility-Administered Medications   Medication Dose Route Frequency Provider Last Rate   • acetaminophen  650 mg Oral Q6H PRN Alpheus Epley, CRNP     • allopurinol  100 mg Oral Daily Alpheus Epley, CRNP     • cefepime  2,000 mg Intravenous Q12H ORACIO Miller 2,000 mg (03/25/23 0600)   • DULoxetine  20 mg Oral Daily ORACIO Miller     • levothyroxine  125 mcg Oral Daily ORACIO Miller     • mupirocin   Topical TID ORACIO Last     • triamcinolone   Topical BID ORACIO Last     • valACYclovir  500 mg Oral BID ORACIO Miller     • vancomycin  12 5 mg/kg (Adjusted) Intravenous Once PRN ORACIO Miller     • [facility]com[facility] oral  [facility]H [facility]     • warfarin  1 mg Oral Once (warfarin) ORACIO Last          Today, Patient Was Seen By: ORACIO Goins    ** Please Note: Dictation voice to text software may have been used in the creation of this document   **

## 2023-03-25 NOTE — ASSESSMENT & PLAN NOTE
Recurrent panniculitis catered on right pannus with purulent drainage and fever at home  With history of Pseudomonas and MRD from the wound cultures  Bedside ultrasound done in the ED without evidence of abscess  The patient received cefepime and vancomycin in the ED    · Blood cultures negative to date  · Wound culture +1 growth of Staphylococcus aureus oxacillin susceptible  · 3/23/2023 ultrasound abdomen: Findings suggest cellulitis without organized fluid collection at the areas of concern    · Continue with IV cefepime and Vanco, sensitivity report reviewed  · Surgery consulted, prescient recommendations  · Continue local wound care per wound care nurse orders

## 2023-03-25 NOTE — ASSESSMENT & PLAN NOTE
· Rate is controlled without AV blocker  · INR goal 2-3  · Coumadin held 3/23/2023 due to INR  · Repeat INR today 2 67, received Coumadin 1 mg orally  · INR 2 30 today  · Continue Coumadin 1 mg today  · PT/INR in a m

## 2023-03-25 NOTE — CASE MANAGEMENT
Case Management Discharge Planning Note    Patient name Jake Sylvester  Location [facility] [address] [id number] MRN [id number]  : 1950 Date 3/25/2023       Current Admission Date: 3/22/2023  Current Admission Diagnosis:Panniculitis   Patient Active Problem List    Diagnosis Date Noted   • Elevated lactic acid level 2023   • BMI 70 and over, adult ([facility] ) 2022   • Chronic atrial fibrillation ([facility] ) 2022   • History of Clostridioides difficile infection 2022   • Lymphedema of extremity 2021   • Other specified hypothyroidism 10/03/2020   • Mild episode of recurrent major depressive disorder ([facility] ) 10/03/2020   • Idiopathic chronic 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 10/03/2020   • Primary osteoarthritis involving multiple joints 10/03/2020   • Chronic diastolic congestive heart failure ([facility] ) 10/03/2020   • Panniculitis 10/03/2020   • Gastroesophageal reflux disease without esophagitis 10/03/2020   • Hx MRSA infection 2020   • Functional gait abnormality 2019   • Impaired mobility 2019   • Osteoarthritis of glenohumeral joint 2019   • Left ovarian cyst 2017   • Depression with anxiety 07/15/2017   • Anemia 2016   • Ambulatory dysfunction 2016   • Acute kidney injury superimposed on CKD ([facility] ) 2016   • Adjustment disorder 2013   • Irritable bowel syndrome 2012   • Left atrial enlargement 2012   • Left ventricular hypertrophy 2012   • Carpal tunnel syndrome 2012   • Gout 2012   • Hyperlipidemia 2012   • Symptomatic menopausal or female climacteric states 2012      LOS (days): 3  Geometric Mean LOS (GMLOS) (days): 2 90  Days to GMLOS:0 2     OBJECTIVE:  Risk of Unplanned Readmission Score: 25 99     Current admission status: Inpatient   Preferred Pharmacy:    [facility]  [facility] [address]  [address] [facility] [address]  Phone: [phone] Fax: [address] [facility]  [address] [facility]  [address] [address]  Phone: [phone] Fax: [phone]    Primary Care Provider: Clementine Powell MD    Primary Insurance: Mateo Florez [address]  Secondary Insurance: A[employer]    DISCHARGE DETAILS:  As per SLIM the pt is hoving copious amt of drainage and will not be able to be DC today  Cancelled the BLS in [address]  TC to [employer] spoke to Leola Tamez and cancelled the ride  TC to Estella Paiz, spoke to on call person at [phone] and cancelled the CG for the weekend  Spoke to the pt at the bedside about same

## 2023-03-25 NOTE — ASSESSMENT & PLAN NOTE
Wt Readings from Last 3 Encounters:   03/25/23 (!) 197 kg (434 lb 4 9 oz)   01/07/23 (!) 193 kg (426 lb)   11/14/22 (!) 193 kg (426 lb)     · Appears euvolemic, weight stable between 196-197 kg   · 5/8/2022 echocardiogram: LVEF 55% with normal systolic function   · Patient received IV fluid hydration 3/22/2023 at which time Lasix was placed on hold  · Creatinine continues to improve, 1 26 today   · Will continue to hold Lasix for now given patient appears euvolemic and creatinine improving  · Reevaluate Lasix regimen in a m  · Continue to monitor daily weights  · Monitor intake and output  · Low-salt diet  · BMP a m

## 2023-03-26 LAB
ANION GAP SERPL CALCULATED.3IONS-SCNC: 5 MMOL/L (ref 4–13)
BASOPHILS # BLD AUTO: 0.04 THOUSANDS/ÂΜL (ref 0–0.1)
BASOPHILS NFR BLD AUTO: 0 % (ref 0–1)
BUN SERPL-MCNC: 23 MG/DL (ref 5–25)
CALCIUM SERPL-MCNC: 8.4 MG/DL (ref 8.4–10.2)
CHLORIDE SERPL-SCNC: 100 MMOL/L (ref 96–108)
CO2 SERPL-SCNC: 27 MMOL/L (ref 21–32)
CREAT SERPL-MCNC: 1.18 MG/DL (ref 0.6–1.3)
EOSINOPHIL # BLD AUTO: 0.35 THOUSAND/ÂΜL (ref 0–0.61)
EOSINOPHIL NFR BLD AUTO: 3 % (ref 0–6)
ERYTHROCYTE [DISTWIDTH] IN BLOOD BY AUTOMATED COUNT: 17.3 % (ref 11.6–15.1)
GFR SERPL CREATININE-BSD FRML MDRD: 46 ML/MIN/1.73SQ M
GLUCOSE SERPL-MCNC: 113 MG/DL (ref 65–140)
HCT VFR BLD AUTO: 34.6 % (ref 34.8–46.1)
HGB BLD-MCNC: 10.2 G/DL (ref 11.5–15.4)
IMM GRANULOCYTES # BLD AUTO: 0.11 THOUSAND/UL (ref 0–0.2)
IMM GRANULOCYTES NFR BLD AUTO: 1 % (ref 0–2)
INR PPP: 2.11 (ref 0.84–1.19)
LYMPHOCYTES # BLD AUTO: 1.06 THOUSANDS/ÂΜL (ref 0.6–4.47)
LYMPHOCYTES NFR BLD AUTO: 9 % (ref 14–44)
MCH RBC QN AUTO: 24.3 PG (ref 26.8–34.3)
MCHC RBC AUTO-ENTMCNC: 29.5 G/DL (ref 31.4–37.4)
MCV RBC AUTO: 82 FL (ref 82–98)
MONOCYTES # BLD AUTO: 0.78 THOUSAND/ÂΜL (ref 0.17–1.22)
MONOCYTES NFR BLD AUTO: 7 % (ref 4–12)
NEUTROPHILS # BLD AUTO: 9.46 THOUSANDS/ÂΜL (ref 1.85–7.62)
NEUTS SEG NFR BLD AUTO: 80 % (ref 43–75)
NRBC BLD AUTO-RTO: 0 /100 WBCS
PLATELET # BLD AUTO: 458 THOUSANDS/UL (ref 149–390)
PMV BLD AUTO: 8.4 FL (ref 8.9–12.7)
POTASSIUM SERPL-SCNC: 4 MMOL/L (ref 3.5–5.3)
PROTHROMBIN TIME: 23.5 SECONDS (ref 11.6–14.5)
RBC # BLD AUTO: 4.2 MILLION/UL (ref 3.81–5.12)
SODIUM SERPL-SCNC: 132 MMOL/L (ref 135–147)
WBC # BLD AUTO: 11.8 THOUSAND/UL (ref 4.31–10.16)

## 2023-03-26 RX ORDER — CLINDAMYCIN PHOSPHATE 600 MG/50ML
600 INJECTION INTRAVENOUS EVERY 8 HOURS
Status: DISCONTINUED | OUTPATIENT
Start: 2023-03-26 | End: 2023-03-27 | Stop reason: HOSPADM

## 2023-03-26 RX ORDER — WARFARIN SODIUM 2 MG/1
2 TABLET ORAL
Status: DISCONTINUED | OUTPATIENT
Start: 2023-03-26 | End: 2023-03-27 | Stop reason: HOSPADM

## 2023-03-26 RX ADMIN — TRIAMCINOLONE ACETONIDE: 1 CREAM TOPICAL at 08:22

## 2023-03-26 RX ADMIN — VALACYCLOVIR HYDROCHLORIDE 500 MG: 500 TABLET, FILM COATED ORAL at 18:27

## 2023-03-26 RX ADMIN — VANCOMYCIN HYDROCHLORIDE 125 MG: 125 CAPSULE ORAL at 08:21

## 2023-03-26 RX ADMIN — DULOXETINE 20 MG: 20 CAPSULE, DELAYED RELEASE ORAL at 08:21

## 2023-03-26 RX ADMIN — MUPIROCIN: 20 OINTMENT TOPICAL at 08:22

## 2023-03-26 RX ADMIN — LEVOTHYROXINE SODIUM 125 MCG: 25 TABLET ORAL at 06:17

## 2023-03-26 RX ADMIN — CLINDAMYCIN PHOSPHATE 600 MG: 600 INJECTION, SOLUTION INTRAVENOUS at 11:50

## 2023-03-26 RX ADMIN — CLINDAMYCIN PHOSPHATE 600 MG: 600 INJECTION, SOLUTION INTRAVENOUS at 20:16

## 2023-03-26 RX ADMIN — ALLOPURINOL 100 MG: 100 TABLET ORAL at 08:21

## 2023-03-26 RX ADMIN — CEFEPIME HYDROCHLORIDE 2000 MG: 2 INJECTION, SOLUTION INTRAVENOUS at 06:17

## 2023-03-26 RX ADMIN — VANCOMYCIN HYDROCHLORIDE 125 MG: 125 CAPSULE ORAL at 21:05

## 2023-03-26 RX ADMIN — VALACYCLOVIR HYDROCHLORIDE 500 MG: 500 TABLET, FILM COATED ORAL at 08:21

## 2023-03-26 RX ADMIN — WARFARIN SODIUM 2 MG: 2 TABLET ORAL at 18:27

## 2023-03-26 RX ADMIN — TRIAMCINOLONE ACETONIDE: 1 CREAM TOPICAL at 18:26

## 2023-03-26 NOTE — PLAN OF CARE
Problem: Potential for Falls  Goal: Patient will remain free of falls  Description: INTERVENTIONS:  - Educate patient/family on patient safety including physical limitations  - Instruct patient to call for assistance with activity   - Consult OT/PT to assist with strengthening/mobility   - Keep Call bell within reach  - Keep bed low and locked with side rails adjusted as appropriate  - Keep care items and personal belongings within reach  - Initiate and maintain comfort rounds  - Make Fall Risk Sign visible to staff  - Offer Toileting every 2 Hours, in advance of need  - Initiate/Maintain bed alarm  - Obtain necessary fall risk management equipment  - Apply yellow socks and bracelet for high fall risk patients  - Consider moving patient to room near nurses station  Outcome: Progressing     Problem: MOBILITY - ADULT  Goal: Maintain or return to baseline ADL function  Description: INTERVENTIONS:  - Educate patient/family on patient safety including physical limitations  - Instruct patient to call for assistance with activity   - Consult OT/PT to assist with strengthening/mobility   - Keep Call bell within reach  - Keep bed low and locked with side rails adjusted as appropriate  - Keep care items and personal belongings within reach  - Initiate and maintain comfort rounds  - Make Fall Risk Sign visible to staff  - Offer Toileting every 2 Hours, in advance of need  - Initiate/Maintain bed alarm  - Obtain necessary fall risk management equipment  - Apply yellow socks and bracelet for high fall risk patients  - Consider moving patient to room near nurses station  Outcome: Progressing  Goal: Maintains/Returns to pre admission functional level  Description: INTERVENTIONS:  - Perform BMAT or MOVE assessment daily    - Set and communicate daily mobility goal to care team and patient/family/caregiver  - Collaborate with rehabilitation services on mobility goals if consulted  - Perform Range of Motion three times a day    - Reposition patient every two hours    - Dangle patient three times a day  - Stand patient three times a day  - Ambulate patient three times a day  - Out of bed to chair three times a day   - Out of bed for meals three times a day  - Out of bed for toileting  - Record patient progress and toleration of activity level   Outcome: Progressing     Problem: PAIN - ADULT  Goal: Verbalizes/displays adequate comfort level or baseline comfort level  Description: Interventions:  - Encourage patient to monitor pain and request assistance  - Assess pain using appropriate pain scale  - Administer analgesics based on type and severity of pain and evaluate response  - Implement non-pharmacological measures as appropriate and evaluate response  - Consider cultural and social influences on pain and pain management  - Notify physician/advanced practitioner if interventions unsuccessful or patient reports new pain  Outcome: Progressing     Problem: INFECTION - ADULT  Goal: Absence or prevention of progression during hospitalization  Description: INTERVENTIONS:  - Assess and monitor for signs and symptoms of infection  - Monitor lab/diagnostic results  - Monitor all insertion sites, i e  indwelling lines, tubes, and drains  - Monitor endotracheal if appropriate and nasal secretions for changes in amount and color  - [place] appropriate cooling/warming therapies per order  - Administer medications as ordered  - Instruct and encourage patient and family to use good hand hygiene technique  - Identify and instruct in appropriate isolation precautions for identified infection/condition  Outcome: Progressing     Problem: SAFETY ADULT  Goal: Patient will remain free of falls  Description: INTERVENTIONS:  - Educate patient/family on patient safety including physical limitations  - Instruct patient to call for assistance with activity   - Consult OT/PT to assist with strengthening/mobility   - Keep Call bell within reach  - Keep bed low and locked with side rails adjusted as appropriate  - Keep care items and personal belongings within reach  - Initiate and maintain comfort rounds  - Make Fall Risk Sign visible to staff  - Offer Toileting every 2 Hours, in advance of need  - Initiate/Maintain bed alarm  - Obtain necessary fall risk management equipment  - Apply yellow socks and bracelet for high fall risk patients  - Consider moving patient to room near nurses station  Outcome: Progressing  Goal: Maintain or return to baseline ADL function  Description: INTERVENTIONS:  - Educate patient/family on patient safety including physical limitations  - Instruct patient to call for assistance with activity   - Consult OT/PT to assist with strengthening/mobility   - Keep Call bell within reach  - Keep bed low and locked with side rails adjusted as appropriate  - Keep care items and personal belongings within reach  - Initiate and maintain comfort rounds  - Make Fall Risk Sign visible to staff  - Offer Toileting every 2 Hours, in advance of need  - Initiate/Maintain bed alarm  - Obtain necessary fall risk management equipment  - Apply yellow socks and bracelet for high fall risk patients  - Consider moving patient to room near nurses station  Outcome: Progressing  Goal: Maintains/Returns to pre admission functional level  Description: INTERVENTIONS:  - Perform BMAT or MOVE assessment daily    - Set and communicate daily mobility goal to care team and patient/family/caregiver  - Collaborate with rehabilitation services on mobility goals if consulted  - Perform Range of Motion three times a day  - Reposition patient every two hours    - Dangle patient three times a day  - Stand patient three times a day  - Ambulate patient three times a day  - Out of bed to chair three times a day   - Out of bed for meals three times a day  - Out of bed for toileting  - Record patient progress and toleration of activity level   Outcome: Progressing     Problem: DISCHARGE PLANNING  Goal: Discharge to home or other facility with appropriate resources  Description: INTERVENTIONS:  - Identify barriers to discharge w/patient and caregiver  - Arrange for needed discharge resources and transportation as appropriate  - Identify discharge learning needs (meds, wound care, etc )  - Arrange for interpretive services to assist at discharge as needed  - Refer to Case Management Department for coordinating discharge planning if the patient needs post-hospital services based on physician/advanced practitioner order or complex needs related to functional status, cognitive ability, or social support system  Outcome: Progressing     Problem: Knowledge Deficit  Goal: Patient/family/caregiver demonstrates understanding of disease process, treatment plan, medications, and discharge instructions  Description: Complete learning assessment and assess knowledge base    Interventions:  - Provide teaching at level of understanding  - Provide teaching via preferred learning methods  Outcome: Progressing     Problem: Prexisting or High Potential for Compromised Skin Integrity  Goal: Skin integrity is maintained or improved  Description: INTERVENTIONS:  - Identify patients at risk for skin breakdown  - Assess and monitor skin integrity  - Assess and monitor nutrition and hydration status  - Monitor labs   - Assess for incontinence   - Turn and reposition patient  - Assist with mobility/ambulation  - Relieve pressure over bony prominences  - Avoid friction and shearing  - Provide appropriate hygiene as needed including keeping skin clean and dry  - Evaluate need for skin moisturizer/barrier cream  - Collaborate with interdisciplinary team   - Patient/family teaching  - Consider wound care consult   Outcome: Progressing

## 2023-03-26 NOTE — PROGRESS NOTES
"Progress Note - General Surgery   Martinez Serrano 71 y o  female MRN: [id number]  Unit/Bed#: -01 Encounter: [id number]    ASSESSMENT:  80F with PMH obesity, lymphedema, Afib, hypothyroidism presents HD#4 with recurrent panniculitis   • AF, VSS  • WBC 11 80 (trend: 12 21, 11 19, 15 41, 21)  • Cr 1 18 (1 26, 1 43)  • Mild stable hyponatremia 132  • Wound cx + Staph aureus  • Blood cx NGTD  • UO: 150cc  • On IV Cefepime/Vanc  • L side pannus appears to be improving, less erythema, draining minimal amount yellow/green tinged fluid    PLAN:  • Continue medical management with IV abx  • Still without indication for surgical intervention at this time; no fluctuant areas requiring drainage  • Aqua K pad PRN  • Analgesia PRN  • Medical management per primary     SUBJECTIVE:  No acute complaints  No pain  No F/C  L side still draining a bit  OBJECTIVE:   Vitals:  Blood pressure 104/54, pulse 67, temperature 98 2 °F (36 8 °C), temperature source Oral, resp  rate 16, height 5' 3\" (1 6 m), weight (!) 197 kg (434 lb 8 4 oz), SpO2 96 %  ,Body mass index is 76 97 kg/m²  I/Os:    Intake/Output Summary (Last 24 hours) at 3/26/2023 0907  Last data filed at 3/25/2023 1100  Gross per 24 hour   Intake --   Output 150 ml   Net -150 ml       Lines/Drains:  Invasive Devices     Peripheral Intravenous Line  Duration           Peripheral IV 03/22/23 Right Antecubital 3 days    Peripheral IV 03/26/23 Right;Ventral (anterior) Forearm <1 day                Physical Exam  Vitals reviewed  Constitutional:       General: She is not in acute distress  Appearance: She is obese  She is not toxic-appearing  Cardiovascular:      Rate and Rhythm: Normal rate  Pulmonary:      Effort: Pulmonary effort is normal    Abdominal:      General: There is no distension  Palpations: Abdomen is soft  Tenderness: There is no abdominal tenderness  There is no guarding or rebound     Skin:     Comments: R and L lower lateral " abdominal wall erythema, pitting edema, warmth  L>R  No drainage from R    L with small amount yellow/green tinged weeping drainage  No areas of fluctuance  area of erythema improving  Neurological:      Mental Status: She is alert    Diagnostics:  I have personally reviewed pertinent lab results  US abdomen limited    Result Date: 3/24/2023  Impression: Findings suggesting cellulitis without organized fluid collection at the areas of clinical concern   Workstation performed: [id number]     Recent Results (from the past 36 hour(s))   Protime-INR    Collection Time: 03/25/23  5:36 AM   Result Value Ref Range    Protime 25 2 (H) 11 6 - 14 5 seconds    INR 2 30 (H) 0 84 - 1 19   Vancomycin, trough    Collection Time: 03/25/23  5:36 AM   Result Value Ref Range    Vancomycin Tr 19 2 10 0 - 20 0 ug/mL   CBC and differential    Collection Time: 03/25/23  5:36 AM   Result Value Ref Range    WBC 12 21 (H) 4 31 - 10 16 Thousand/uL    RBC 4 39 3 81 - 5 12 Million/uL    Hemoglobin 10 6 (L) 11 5 - 15 4 g/dL    Hematocrit 36 1 34 8 - 46 1 %    MCV 82 82 - 98 fL    MCH 24 1 (L) 26 8 - 34 3 pg    MCHC 29 4 (L) 31 4 - 37 4 g/dL    RDW 17 2 (H) 11 6 - 15 1 %    MPV 8 4 (L) 8 9 - 12 7 fL    Platelets 062 (H) 395 - 390 Thousands/uL    nRBC 0 /100 WBCs    Neutrophils Relative 82 (H) 43 - 75 %    Immat GRANS % 1 0 - 2 %    Lymphocytes Relative 8 (L) 14 - 44 %    Monocytes Relative 6 4 - 12 %    Eosinophils Relative 3 0 - 6 %    Basophils Relative 0 0 - 1 %    Neutrophils Absolute 10 00 (H) 1 85 - 7 62 Thousands/µL    Immature Grans Absolute 0 07 0 00 - 0 20 Thousand/uL    Lymphocytes Absolute 0 94 0 60 - 4 47 Thousands/µL    Monocytes Absolute 0 77 0 17 - 1 22 Thousand/µL    Eosinophils Absolute 0 39 0 00 - 0 61 Thousand/µL    Basophils Absolute 0 04 0 00 - 0 10 Thousands/µL   Basic metabolic panel    Collection Time: 03/25/23  5:36 AM   Result Value Ref Range    Sodium 134 (L) 135 - 147 mmol/L    Potassium 4 0 3 5 - 5 3 mmol/L Chloride 99 96 - 108 mmol/L    CO2 30 21 - 32 mmol/L    ANION GAP 5 4 - 13 mmol/L    BUN 22 5 - 25 mg/dL    Creatinine 1 26 0 60 - 1 30 mg/dL    Glucose 116 65 - 140 mg/dL    Calcium 8 4 8 4 - 10 2 mg/dL    eGFR 42 ml/min/1 73sq m   Procalcitonin    Collection Time: 03/25/23  5:36 AM   Result Value Ref Range    Procalcitonin 0 16 <=0 25 ng/ml   Protime-INR    Collection Time: 03/26/23  4:31 AM   Result Value Ref Range    Protime 23 5 (H) 11 6 - 14 5 seconds    INR 2 11 (H) 0 84 - 1 19   CBC and differential    Collection Time: 03/26/23  4:31 AM   Result Value Ref Range    WBC 11 80 (H) 4 31 - 10 16 Thousand/uL    RBC 4 20 3 81 - 5 12 Million/uL    Hemoglobin 10 2 (L) 11 5 - 15 4 g/dL    Hematocrit 34 6 (L) 34 8 - 46 1 %    MCV 82 82 - 98 fL    MCH 24 3 (L) 26 8 - 34 3 pg    MCHC 29 5 (L) 31 4 - 37 4 g/dL    RDW 17 3 (H) 11 6 - 15 1 %    MPV 8 4 (L) 8 9 - 12 7 fL    Platelets 033 (H) 884 - 390 Thousands/uL    nRBC 0 /100 WBCs    Neutrophils Relative 80 (H) 43 - 75 %    Immat GRANS % 1 0 - 2 %    Lymphocytes Relative 9 (L) 14 - 44 %    Monocytes Relative 7 4 - 12 %    Eosinophils Relative 3 0 - 6 %    Basophils Relative 0 0 - 1 %    Neutrophils Absolute 9 46 (H) 1 85 - 7 62 Thousands/µL    Immature Grans Absolute 0 11 0 00 - 0 20 Thousand/uL    Lymphocytes Absolute 1 06 0 60 - 4 47 Thousands/µL    Monocytes Absolute 0 78 0 17 - 1 22 Thousand/µL    Eosinophils Absolute 0 35 0 00 - 0 61 Thousand/µL    Basophils Absolute 0 04 0 00 - 0 10 Thousands/µL   Basic metabolic panel    Collection Time: 03/26/23  4:31 AM   Result Value Ref Range    Sodium 132 (L) 135 - 147 mmol/L    Potassium 4 0 3 5 - 5 3 mmol/L    Chloride 100 96 - 108 mmol/L    CO2 27 21 - 32 mmol/L    ANION GAP 5 4 - 13 mmol/L    BUN 23 5 - 25 mg/dL    Creatinine 1 18 0 60 - 1 30 mg/dL    Glucose 113 65 - 140 mg/dL    Calcium 8 4 8 4 - 10 2 mg/dL    eGFR 46 ml/min/1 73sq m       Current Medications:  Scheduled Meds:  Current Facility-Administered Medications Medication Dose Route Frequency Provider Last Rate   • acetaminophen  650 mg Oral Q6H PRN ORACIO Thomson     • allopurinol  100 mg Oral Daily ORACIO Thomson     • cefepime  2,000 mg Intravenous Q12H ORACIO Thomson 2,000 mg (03/26/23 0617)   • DULoxetine  20 mg Oral Daily ORACIO Thomson     • levothyroxine  125 mcg Oral Daily ORACIO Thomson     • mupirocin   Topical TID OARCIO Last     • triamcinolone   Topical BID ORACIO Last     • valACYclovir  500 mg Oral BID ORACIO Thomson     • vancomycin  12 5 mg/kg (Adjusted) Intravenous Once PRN ORACIO Thomson     • vancomycin oral  125 mg Oral Q12H [address] ORACIO Thomson     • warfarin  2 mg Oral Daily (warfarin) ORACIO Last       Continuous Infusions:   PRN Meds:  •  acetaminophen  •  vancomycin      MYA Morales  3/26/2023

## 2023-03-26 NOTE — ASSESSMENT & PLAN NOTE
Recurrent panniculitis catered on right pannus with purulent drainage and fever at home  With history of Pseudomonas and MRD from the wound cultures  Bedside ultrasound done in the ED without evidence of abscess  The patient received cefepime and vancomycin in the ED    · Blood cultures negative to date  · Wound culture +1 growth of Staphylococcus aureus oxacillin susceptible  · 3/23/2023 ultrasound abdomen: Findings suggest cellulitis without organized fluid collection at the areas of concern  · Surgery consulted, appreciate recommendations  No need for surgical intervention at this time    · Patient was receiving IV vancomycin and cefepime  · Discussed with pharmacy, reviewed wound culture and sensitivity  · Given procalcitonin 0 16 yesterday, will de-escalate antibiotic  · Will start Cleocin IV today  · If condition changes, consider consult ID  · Continue local wound care per wound care nurse orders

## 2023-03-26 NOTE — ASSESSMENT & PLAN NOTE
· Rate is controlled without AV blocker  · INR goal 2-3  · Coumadin was initially on hold due to elevated INR  · Coumadin 1 mg yesterday, INR today 2 11  · Initiated Coumadin 2 mg daily starting today  · PT/INR in a m

## 2023-03-26 NOTE — PROGRESS NOTES
[facility]  Progress Note  Name: Maru Renner  MRN: [id number]  Unit/Bed#: -01 I Date of Admission: 3/22/2023   Date of Service: 3/26/2023 I Hospital Day: 4    Assessment/Plan   * Panniculitis  Assessment & Plan  Recurrent panniculitis catered on right pannus with purulent drainage and fever at home  With history of Pseudomonas and MRD from the wound cultures  Bedside ultrasound done in the ED without evidence of abscess  The patient received cefepime and vancomycin in the ED    · Blood cultures negative to date  · Wound culture +1 growth of Staphylococcus aureus oxacillin susceptible  · 3/23/2023 ultrasound abdomen: Findings suggest cellulitis without organized fluid collection at the areas of concern  · Surgery consulted, appreciate recommendations  No need for surgical intervention at this time    · Patient was receiving IV vancomycin and cefepime  · Discussed with pharmacy, reviewed wound culture and sensitivity  · Given procalcitonin 0 16 yesterday, will de-escalate antibiotic  · Will start Cleocin IV today  · If condition changes, consider consult ID  · Continue local wound care per wound care nurse orders      Elevated lactic acid level  Assessment & Plan  · POA, lactic acid 3 0   · Likely due to acute infection and fevers   · Resolved with fluid hydration    History of Clostridioides difficile infection  Assessment & Plan  · Continue prophylactic oral vancomycin   · Will need to remain on vancomycin 72 hours post antibiotic course     Chronic diastolic congestive heart failure (HCC)  Assessment & Plan  Wt Readings from Last 3 Encounters:   03/26/23 (!) 197 kg (434 lb 8 4 oz)   01/07/23 (!) 193 kg (426 lb)   11/14/22 (!) 193 kg (426 lb)     · Appears euvolemic, weight stable between 196-197 kg   · 5/8/2022 echocardiogram: LVEF 55% with normal systolic function   · Patient received IV fluid hydration 3/22/2023 at which time Lasix was placed on hold  · Creatinine continues to trend downward, today 1 18  · Will continue to hold Lasix for now given patient appears euvolemic and creatinine improving  · Reevaluate Lasix regimen in a m  · Continue to monitor daily weights  · Monitor intake and output  · Low-salt diet  · BMP a m  Chronic atrial fibrillation (HCC)  Assessment & Plan  · Rate is controlled without AV blocker  · INR goal 2-3  · Coumadin was initially on hold due to elevated INR  · Coumadin 1 mg yesterday, INR today 2 11  · Initiated Coumadin 2 mg daily starting today  · PT/INR in a m  Other specified hypothyroidism  Assessment & Plan  · Continue levothyroxine    Lymphedema of extremity  Assessment & Plan  · Elevate lower extremities in bed      Mild episode of recurrent major depressive disorder (HCC)  Assessment & Plan  · No signs of depression at this time   · continue Cymbalta    Ambulatory dysfunction  Assessment & Plan  · Patient is nonambulatory at baseline  · Has caregivers at home  · Turn and reposition every 2 hours         VTE Pharmacologic Prophylaxis:   Pharmacologic: Warfarin (Coumadin)  Mechanical VTE Prophylaxis in Place: Yes    Patient Centered Rounds: I have performed bedside rounds with nursing staff today  Discussions with Specialists or Other Care Team Provider: Pharmacy, surgery, nursing    Education and Discussions with Family / Patient: Treatment plan discussed with patient who understands the plan as it has been explained and is agreeable to the plan as stated  All questions answered satisfaction  Time Spent for Care: 40 minutes  More than 50% of total time spent on counseling and coordination of care as described above      Current Length of Stay: 4 day(s)    Current Patient Status: Inpatient   Certification Statement: The patient will continue to require additional inpatient hospital stay due to Need for IV antibiotics    Discharge Plan: Patient's antibiotics were de-escalated today based on wound sensitivity results and discussion with pharmacy  Changed to IV Cleocin  We will monitor how patient tolerates antibiotic overnight, tentative discharge home with home health care in a m  Code Status: Level 1 - Full Code      Subjective:   Feeling better today  Less drainage from pannus  No complaints of pain  Objective:     Vitals:   Temp (24hrs), Av 5 °F (36 9 °C), Min:98 2 °F (36 8 °C), Max:99 °F (37 2 °C)    Temp:  [98 2 °F (36 8 °C)-99 °F (37 2 °C)] 98 2 °F (36 8 °C)  HR:  [66-72] 67  Resp:  [16-20] 16  BP: (104-110)/(54-59) 104/54  SpO2:  [95 %-96 %] 96 %  Body mass index is 76 97 kg/m²  Input and Output Summary (last 24 hours): Intake/Output Summary (Last 24 hours) at 3/26/2023 1258  Last data filed at 3/26/2023 1243  Gross per 24 hour   Intake 460 ml   Output --   Net 460 ml       Physical Exam:     Physical Exam  Vitals and nursing note reviewed  Constitutional:       General: She is not in acute distress  Appearance: She is obese  She is not ill-appearing  HENT:      Head: Normocephalic and atraumatic  Nose: Nose normal       Mouth/Throat:      Mouth: Mucous membranes are moist    Cardiovascular:      Rate and Rhythm: Normal rate and regular rhythm  Pulses: Normal pulses  Heart sounds: Normal heart sounds  Pulmonary:      Effort: Pulmonary effort is normal  No respiratory distress  Breath sounds: Normal breath sounds  No stridor  No wheezing or rales  Abdominal:      General: Bowel sounds are normal  There is no distension  Palpations: Abdomen is soft  There is no mass  Tenderness: There is no abdominal tenderness  There is no guarding  Musculoskeletal:         General: Normal range of motion  Skin:     General: Skin is warm and dry  Capillary Refill: Capillary refill takes less than 2 seconds  Comments: Mild erythema pannus, less drainage today and no odor   Neurological:      General: No focal deficit present        Mental Status: She is alert and oriented to person, place, and time  Mental status is at baseline  Cranial Nerves: No cranial nerve deficit  Sensory: No sensory deficit  Motor: No weakness  Comments: Patient is bedbound at baseline   Psychiatric:         Mood and Affect: Mood normal          Behavior: Behavior normal          Thought Content: Thought content normal          Judgment: Judgment normal          Additional Data:     Labs:    Results from last 7 days   Lab Units 03/26/23  0431   WBC Thousand/uL 11 80*   HEMOGLOBIN g/dL 10 2*   HEMATOCRIT % 34 6*   PLATELETS Thousands/uL 458*   NEUTROS PCT % 80*   LYMPHS PCT % 9*   MONOS PCT % 7   EOS PCT % 3     Results from last 7 days   Lab Units 03/26/23  0431 03/23/23  0432 03/22/23  1736   POTASSIUM mmol/L 4 0   < > 5 1   CHLORIDE mmol/L 100   < > 96   CO2 mmol/L 27   < > 26   BUN mg/dL 23   < > 18   CREATININE mg/dL 1 18   < > 1 17   CALCIUM mg/dL 8 4   < > 8 9   ALK PHOS U/L  --   --  107*   ALT U/L  --   --  9   AST U/L  --   --  21    < > = values in this interval not displayed  Results from last 7 days   Lab Units 03/26/23  0431   INR  2 11*       * I Have Reviewed All Lab Data Listed Above  * Additional Pertinent Lab Tests Reviewed: [facility] Admission Reviewed    Imaging:    Imaging Reports Reviewed Today Include: Ultrasound abdomen  Imaging Personally Reviewed by Myself Includes:      Recent Cultures (last 7 days):     Results from last 7 days   Lab Units 03/22/23  1828 03/22/23  1738 03/22/23  1736   BLOOD CULTURE   --  No Growth at 72 hrs  No Growth at 72 hrs     GRAM STAIN RESULT  Rare Polys*  Rare Gram positive rods*  --   --    WOUND CULTURE  1+ Growth of Staphylococcus aureus*  1+ Growth of  --   --        Last 24 Hours Medication List:   Current Facility-Administered Medications   Medication Dose Route Frequency Provider Last Rate   • acetaminophen  650 mg Oral Q6H PRN ORACIO Archer     • allopurinol  100 mg Oral Daily Tamiko French ORACIO Lai     • clindamycin  600 mg Intravenous Q8H ORACIO Last 600 mg (03/26/23 1150)   • DULoxetine  20 mg Oral Daily ORACIO Tobar     • levothyroxine  125 mcg Oral Daily ORACIO Tobar     • mupirocin   Topical TID ORACIO Last     • triamcinolone   Topical BID ORACIO Last     • valACYclovir  500 mg Oral BID ORACIO Tobar     • vancomycin oral  125 mg Oral Q12H [address] ORACIO Tobar     • warfarin  2 mg Oral Daily (warfarin) ORACIO Last          Today, Patient Was Seen By: ORACIO Valdes    ** Please Note: Dictation voice to text software may have been used in the creation of this document   **

## 2023-03-26 NOTE — ASSESSMENT & PLAN NOTE
Wt Readings from Last 3 Encounters:   03/26/23 (!) 197 kg (434 lb 8 4 oz)   01/07/23 (!) 193 kg (426 lb)   11/14/22 (!) 193 kg (426 lb)     · Appears euvolemic, weight stable between 196-197 kg   · 5/8/2022 echocardiogram: LVEF 55% with normal systolic function   · Patient received IV fluid hydration 3/22/2023 at which time Lasix was placed on hold  · Creatinine continues to trend downward, today 1 18  · Will continue to hold Lasix for now given patient appears euvolemic and creatinine improving  · Reevaluate Lasix regimen in a m  · Continue to monitor daily weights  · Monitor intake and output  · Low-salt diet  · BMP a m

## 2023-03-27 VITALS
RESPIRATION RATE: 20 BRPM | DIASTOLIC BLOOD PRESSURE: 53 MMHG | OXYGEN SATURATION: 95 % | BODY MASS INDEX: 51.91 KG/M2 | HEIGHT: 63 IN | SYSTOLIC BLOOD PRESSURE: 102 MMHG | WEIGHT: 293 LBS | TEMPERATURE: 98 F | HEART RATE: 68 BPM

## 2023-03-27 LAB
ANION GAP SERPL CALCULATED.3IONS-SCNC: 7 MMOL/L (ref 4–13)
BACTERIA BLD CULT: NORMAL
BACTERIA BLD CULT: NORMAL
BASOPHILS # BLD AUTO: 0.07 THOUSANDS/ÂΜL (ref 0–0.1)
BASOPHILS NFR BLD AUTO: 1 % (ref 0–1)
BUN SERPL-MCNC: 25 MG/DL (ref 5–25)
CALCIUM SERPL-MCNC: 8.5 MG/DL (ref 8.4–10.2)
CHLORIDE SERPL-SCNC: 100 MMOL/L (ref 96–108)
CO2 SERPL-SCNC: 27 MMOL/L (ref 21–32)
CREAT SERPL-MCNC: 1.19 MG/DL (ref 0.6–1.3)
EOSINOPHIL # BLD AUTO: 0.29 THOUSAND/ÂΜL (ref 0–0.61)
EOSINOPHIL NFR BLD AUTO: 2 % (ref 0–6)
ERYTHROCYTE [DISTWIDTH] IN BLOOD BY AUTOMATED COUNT: 17.2 % (ref 11.6–15.1)
GFR SERPL CREATININE-BSD FRML MDRD: 46 ML/MIN/1.73SQ M
GLUCOSE SERPL-MCNC: 106 MG/DL (ref 65–140)
HCT VFR BLD AUTO: 35.7 % (ref 34.8–46.1)
HGB BLD-MCNC: 10.4 G/DL (ref 11.5–15.4)
IMM GRANULOCYTES # BLD AUTO: 0.09 THOUSAND/UL (ref 0–0.2)
IMM GRANULOCYTES NFR BLD AUTO: 1 % (ref 0–2)
INR PPP: 1.98 (ref 0.84–1.19)
LYMPHOCYTES # BLD AUTO: 1.27 THOUSANDS/ÂΜL (ref 0.6–4.47)
LYMPHOCYTES NFR BLD AUTO: 10 % (ref 14–44)
MCH RBC QN AUTO: 24.1 PG (ref 26.8–34.3)
MCHC RBC AUTO-ENTMCNC: 29.1 G/DL (ref 31.4–37.4)
MCV RBC AUTO: 83 FL (ref 82–98)
MONOCYTES # BLD AUTO: 0.78 THOUSAND/ÂΜL (ref 0.17–1.22)
MONOCYTES NFR BLD AUTO: 6 % (ref 4–12)
NEUTROPHILS # BLD AUTO: 9.98 THOUSANDS/ÂΜL (ref 1.85–7.62)
NEUTS SEG NFR BLD AUTO: 80 % (ref 43–75)
NRBC BLD AUTO-RTO: 0 /100 WBCS
PLATELET # BLD AUTO: 488 THOUSANDS/UL (ref 149–390)
PMV BLD AUTO: 8.4 FL (ref 8.9–12.7)
POTASSIUM SERPL-SCNC: 4 MMOL/L (ref 3.5–5.3)
PROTHROMBIN TIME: 22.4 SECONDS (ref 11.6–14.5)
RBC # BLD AUTO: 4.31 MILLION/UL (ref 3.81–5.12)
SODIUM SERPL-SCNC: 134 MMOL/L (ref 135–147)
WBC # BLD AUTO: 12.48 THOUSAND/UL (ref 4.31–10.16)

## 2023-03-27 RX ORDER — VANCOMYCIN HYDROCHLORIDE 125 MG/1
125 CAPSULE ORAL EVERY 6 HOURS SCHEDULED
Status: CANCELLED | OUTPATIENT
Start: 2023-03-27

## 2023-03-27 RX ORDER — VANCOMYCIN HYDROCHLORIDE 125 MG/1
125 CAPSULE ORAL EVERY 12 HOURS SCHEDULED
Qty: 10 CAPSULE | Refills: 0 | Status: SHIPPED | OUTPATIENT
Start: 2023-03-27 | End: 2023-04-01

## 2023-03-27 RX ORDER — VANCOMYCIN HYDROCHLORIDE 125 MG/1
125 CAPSULE ORAL ONCE
Status: COMPLETED | OUTPATIENT
Start: 2023-03-27 | End: 2023-03-27

## 2023-03-27 RX ORDER — CEPHALEXIN 500 MG/1
1000 CAPSULE ORAL EVERY 6 HOURS SCHEDULED
Qty: 16 CAPSULE | Refills: 0 | Status: SHIPPED | OUTPATIENT
Start: 2023-03-27 | End: 2023-03-29

## 2023-03-27 RX ADMIN — VANCOMYCIN HYDROCHLORIDE 125 MG: 125 CAPSULE ORAL at 14:34

## 2023-03-27 RX ADMIN — VALACYCLOVIR HYDROCHLORIDE 500 MG: 500 TABLET, FILM COATED ORAL at 10:03

## 2023-03-27 RX ADMIN — DULOXETINE 20 MG: 20 CAPSULE, DELAYED RELEASE ORAL at 10:03

## 2023-03-27 RX ADMIN — LEVOTHYROXINE SODIUM 125 MCG: 25 TABLET ORAL at 05:23

## 2023-03-27 RX ADMIN — ALLOPURINOL 100 MG: 100 TABLET ORAL at 10:03

## 2023-03-27 RX ADMIN — VANCOMYCIN HYDROCHLORIDE 125 MG: 125 CAPSULE ORAL at 10:02

## 2023-03-27 RX ADMIN — CLINDAMYCIN PHOSPHATE 600 MG: 600 INJECTION, SOLUTION INTRAVENOUS at 03:07

## 2023-03-27 RX ADMIN — CLINDAMYCIN PHOSPHATE 600 MG: 600 INJECTION, SOLUTION INTRAVENOUS at 13:00

## 2023-03-27 NOTE — ASSESSMENT & PLAN NOTE
· Rate is controlled without AV blocker  · INR goal 2-3  · Coumadin was initially on hold due to elevated INR  · INR 1 98 on day of discharge  · Resume home coumadin

## 2023-03-27 NOTE — NURSING NOTE
IV removed with catheter tip intact, DSD and pressure applied  AVS reviewed with the patient by the virtual nurse, who addressed all questions and concerns  Patient assisted transport team with getting patient on the stretcher at time of discharge  Transport team given all patient belongings, and AVS at the time of discharge

## 2023-03-27 NOTE — ASSESSMENT & PLAN NOTE
Wt Readings from Last 3 Encounters:   03/27/23 (!) 197 kg (434 lb 4 9 oz)   01/07/23 (!) 193 kg (426 lb)   11/14/22 (!) 193 kg (426 lb)     · Appears euvolemic, weight stable between 196-197 kg   · 5/8/2022 echocardiogram: LVEF 55% with normal systolic function   · Patient received IV fluid hydration 3/22/2023 at which time Lasix was placed on hold  · Creatinine at basline  · Resume po lasix on discharge

## 2023-03-27 NOTE — DISCHARGE SUMMARY
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 with purulent drainage and fever at home  · Bedside ultrasound done in the ED without evidence of abscess  · Blood cultures negative to date  · Wound culture +1 growth of Staphylococcus aureus oxacillin susceptible  · US soft tissue/abd:3/23/2023: negative for organized fluid collection at the areas of concern  · Surgery consulted, appreciate recommendations  No need for surgical intervention at this time  · Patient was receiving IV vancomycin and cefepime, switched to IV clindamycin 3/26  · Spoke with ID- given patient's history of Cdiff and allergies- recommend Keflex 1000 mg po q 6 hours, the patient was discharged on this to complete a 7 day course   She was also discharged on PO Vancomycin for Cdiff prophylaxis to be completed 72 hours after completing Kefelx  · [address] arranged by CM  · Outpatient follow-up with PCP      Elevated lactic acid level  Assessment & Plan  · POA, lactic acid 3 0   · Likely due to acute infection and fevers   · Resolved with fluid hydration    Chronic atrial fibrillation (HCC)  Assessment & Plan  · Rate is controlled without AV blocker  · INR goal 2-3  · Coumadin was initially on hold due to elevated INR  · INR 1 98 on day of discharge  · Resume home coumadin    History of Clostridioides difficile infection  Assessment & Plan  · Continue prophylactic oral vancomycin   · Will need to remain on vancomycin 72 hours post antibiotic course     Chronic diastolic congestive heart failure [facility])  Assessment & Plan  Wt Readings from Last 3 Encounters:   03/27/23 (!) 197 kg (434 lb 4 9 oz)   01/07/23 (!) 193 kg (426 lb)   11/14/22 (!) 193 kg (426 lb) · Appears euvolemic, weight stable between 196-197 kg   · 5/8/2022 echocardiogram: LVEF 55% with normal systolic function   · Patient received IV fluid hydration 3/22/2023 at which time Lasix was placed on hold  · Creatinine at basline  · Resume po lasix on discharge      Ambulatory dysfunction  Assessment & Plan  · Patient is nonambulatory at baseline  · Has caregivers at home  · Turn and reposition every 2 hours      Mild episode of recurrent major depressive disorder ([address] )  Assessment & Plan  · No signs of depression at this time   · Continue home regimen of Cymbalta      Medical Problems     Resolved Problems  Date Reviewed: 3/27/2023   None       Discharging Physician / Practitioner: Theresa Reyes PA-C  PCP: José Miguel Dunaway MD  Admission Date:   Admission Orders (From admission, onward)     Ordered        03/22/23 1852  Inpatient Admission  Once            03/22/23 1848  INPATIENT ADMISSION  Once                      Discharge Date: 03/27/23    Consultations During Hospital Stay:  · General surgery    Procedures Performed:   · none    Significant Findings / Test Results:   US abdomen limited    Result Date: 3/24/2023  · Impression: Findings suggesting cellulitis without organized fluid collection at the areas of clinical concern  Workstation performed: [id number]   ·     Incidental Findings:   · none       Test Results Pending at Discharge (will require follow up):   · none     Outpatient Tests Requested:  · none    Complications:  none    Reason for Admission: panniculitis     Hospital Course:   Tses Reece is a 71 y o  female patient who originally presented to the hospital on 3/22/2023 due to fevers and drainage from right pannus  Please see H&P by ORACIO Kraus dated 3/22/2023 for complete details of presentation  The patient was started on IV Vancomcyin/Cefepime in the ED, this was continued on admission and PO Vancomycin was added given history of cdiff   General surgery "was consulted and ordered an US abdomen which was negative for fluid collection  Per general surgery- no surgical intervention needed at that time  The patient's cellulitis improved  Wound culture grew MSSA  The patient's antibiotics were switched to IV Clindamycin given her allergies  I spoke to ID who recommended discharging patient on PO Keflex 1000 mg q 6 hours given history of Cdiff  The patient was discharged to complete a 7 day course  She was also continued on PO Vancomycin to be completed 72 hours after Keflex for Cdiff prophylaxis  HHC was sent up by NABEEL  The patient was instructed to follow-up with her PCP within 1 week  Please see above list of diagnoses and related plan for additional information  Condition at Discharge: good    Discharge Day Visit / Exam:   Subjective:    Vitals: Blood Pressure: 108/53 (03/27/23 1000)  Pulse: 72 (03/27/23 1000)  Temperature: 97 9 °F (36 6 °C) (03/27/23 0704)  Temp Source: Oral (03/26/23 2225)  Respirations: 20 (03/27/23 0704)  Height: 5' 3\" (160 cm) (03/22/23 1713)  Weight - Scale: (!) 197 kg (434 lb 4 9 oz) (03/27/23 0600)  SpO2: 94 % (03/27/23 1000)  Exam:   Physical Exam  Vitals and nursing note reviewed  Constitutional:       Appearance: She is obese  HENT:      Head: Normocephalic and atraumatic  Cardiovascular:      Rate and Rhythm: Normal rate and regular rhythm  Heart sounds: Normal heart sounds  Pulmonary:      Effort: Pulmonary effort is normal       Breath sounds: Normal breath sounds  No wheezing, rhonchi or rales  Abdominal:      General: There is no distension  Palpations: Abdomen is soft  Tenderness: There is no abdominal tenderness  Skin:     General: Skin is warm and dry  Comments: Wound noted under right pannus- no signs of infection, on drainage   Neurological:      General: No focal deficit present  Mental Status: She is alert and oriented to person, place, and time     Psychiatric:         Attention and " Perception: Attention normal          Mood and Affect: Mood normal          Speech: Speech normal          Behavior: Behavior normal          Thought Content: Thought content normal           Discussion with Family: Patient declined call to   Discharge instructions/Information to patient and family:   See after visit summary for information provided to patient and family  Provisions for Follow-Up Care:  See after visit summary for information related to follow-up care and any pertinent home health orders  Disposition:   Home with [facility] Services (Reminder: Complete face to face encounter)    Planned Readmission: no     Discharge Statement:  I spent 25 minutes discharging the patient  This time was spent on the day of discharge  I had direct contact with the patient on the day of discharge  Greater than 50% of the total time was spent examining patient, answering all patient questions, arranging and discussing plan of care with patient as well as directly providing post-discharge instructions  Additional time then spent on discharge activities  Discharge Medications:  See after visit summary for reconciled discharge medications provided to patient and/or family        **Please Note: This note may have been constructed using a voice recognition system**

## 2023-03-27 NOTE — ASSESSMENT & PLAN NOTE
· Recurrent panniculitis on right pannus with purulent drainage and fever at home  · Bedside ultrasound done in the ED without evidence of abscess  · Blood cultures negative to date  · Wound culture +1 growth of Staphylococcus aureus oxacillin susceptible  · US soft tissue/abd:3/23/2023: negative for organized fluid collection at the areas of concern  · Surgery consulted, appreciate recommendations  No need for surgical intervention at this time  · Patient was receiving IV vancomycin and cefepime, switched to IV clindamycin 3/26  · Spoke with ID- given patient's history of Cdiff and allergies- recommend Keflex 1000 mg po q 6 hours, the patient was discharged on this to complete a 7 day course   She was also discharged on PO Vancomycin for Cdiff prophylaxis to be completed 72 hours after completing Kefelx  · Iliana Tai arranged by NABEEL  · Outpatient follow-up with PCP

## 2023-03-27 NOTE — PROGRESS NOTES
"Progress Note - General Surgery   Figueroa Meals 71 y o  female MRN: [id number]  Unit/Bed#: -01 Encounter: [id number]    Assessment:  71year old female with PMH significant for obesity, lymphedema, Afib, hypothyroidism presents HD#5 for recurrent panniculitis   · Afebrile, vitals stable   · Hyponatremia, 134  · WBC 12 48  · Hgb 10 4  · Wound cultures positive staph aureus   · L side pannus with minimal drainage  Erythema improving    Plan:  · No surgical intervention indicated at this time as there are no fluctuant areas present  · Continue IV abx   · Aqua K pad as needed  · Antiemetics prn   · Rest of medical management per SLIM    Subjective/Objective   Subjective: No acute complaints    Objective:   Blood pressure 108/53, pulse 72, temperature 97 9 °F (36 6 °C), resp  rate 20, height 5' 3\" (1 6 m), weight (!) 197 kg (434 lb 4 9 oz), SpO2 94 %  ,Body mass index is 76 93 kg/m²  Intake/Output Summary (Last 24 hours) at 3/27/2023 1049  Last data filed at 3/27/2023 0892  Gross per 24 hour   Intake 900 ml   Output --   Net 900 ml       Invasive Devices     Peripheral Intravenous Line  Duration           Peripheral IV 03/26/23 Right;Ventral (anterior) Forearm 1 day                Physical Exam  Vitals reviewed  Constitutional:       Appearance: She is obese  HENT:      Head: Normocephalic and atraumatic  Cardiovascular:      Rate and Rhythm: Normal rate  Pulmonary:      Effort: Pulmonary effort is normal  No respiratory distress  Abdominal:      General: There is no distension  Palpations: Abdomen is soft  Tenderness: There is no abdominal tenderness  There is no guarding  Comments: Large abdominal pannus  Left side with minimal drainage  Erythema improving  No signs of fluctuant areas  Musculoskeletal:      Right lower leg: No edema  Left lower leg: No edema  Skin:     General: Skin is warm and dry  Neurological:      General: No focal deficit present        " Mental Status: She is alert  Mental status is at baseline  Psychiatric:         Mood and Affect: Mood normal          Behavior: Behavior normal        Lab, Imaging and other studies:  I have personally reviewed pertinent lab results      CBC:   Lab Results   Component Value Date    WBC 12 48 (H) 03/27/2023    HGB 10 4 (L) 03/27/2023    HCT 35 7 03/27/2023    MCV 83 03/27/2023     (H) 03/27/2023    MCH 24 1 (L) 03/27/2023    MCHC 29 1 (L) 03/27/2023    RDW 17 2 (H) 03/27/2023    MPV 8 4 (L) 03/27/2023    NRBC 0 03/27/2023     CMP:   Lab Results   Component Value Date    SODIUM 134 (L) 03/27/2023    K 4 0 03/27/2023     03/27/2023    CO2 27 03/27/2023    BUN 25 03/27/2023    CREATININE 1 19 03/27/2023    CALCIUM 8 5 03/27/2023    EGFR 46 03/27/2023     VTE Pharmacologic Prophylaxis: Warfarin (Coumadin)  VTE Mechanical Prophylaxis: sequential compression device    Avani Root PA-C

## 2023-03-27 NOTE — CASE MANAGEMENT
Case Management Discharge Planning Note    Patient name Sol Carpenter  Location [address] 203/-92 MRN [id number]  : 1950 Date 3/27/2023       Current Admission Date: 3/22/2023  Current Admission Diagnosis:Panniculitis   Patient Active Problem List    Diagnosis Date Noted   • Elevated lactic acid level 2023   • BMI 70 and over, adult ([facility] ) 2022   • Chronic atrial fibrillation ([facility] ) 2022   • History of Clostridioides difficile infection 2022   • Lymphedema of extremity 2021   • Other specified hypothyroidism 10/03/2020   • Mild episode of recurrent major depressive disorder ([facility] ) 10/03/2020   • Idiopathic chronic 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 10/03/2020   • Primary osteoarthritis involving multiple joints 10/03/2020   • Chronic diastolic congestive heart failure ([facility] ) 10/03/2020   • Panniculitis 10/03/2020   • Gastroesophageal reflux disease without esophagitis 10/03/2020   • Hx MRSA infection 2020   • Functional gait abnormality 2019   • Impaired mobility 2019   • Osteoarthritis of glenohumeral joint 2019   • Left ovarian cyst 2017   • Depression with anxiety 07/15/2017   • Anemia 2016   • Ambulatory dysfunction 2016   • Acute kidney injury superimposed on CKD ([facility] ) 2016   • Adjustment disorder 2013   • Irritable bowel syndrome 2012   • Left atrial enlargement 2012   • Left ventricular hypertrophy 2012   • Carpal tunnel syndrome 2012   • Gout 2012   • Hyperlipidemia 2012   • Symptomatic menopausal or female climacteric states 2012      LOS (days): 5  Geometric Mean LOS (GMLOS) (days): 2 90  Days to GMLOS:-1 9     OBJECTIVE:  Risk of Unplanned Readmission Score: 27 72         Current admission status: Inpatient   [facility]  [address] [facility]  [facility] [address]  Phone: [phone] Fax: [address] [facility], [address]  [address]  [address] [address]  Phone: [phone] Fax: [phone]    Primary Care Provider: Marquis Tristan MD    Primary Insurance: [facility]  Secondary Insurance: A[employer]    DISCHARGE DETAILS:                                          Other Referral/Resources/Interventions Provided:  Interventions: Prescription Price Check  Referral Comments: cm placed a [facility] to [facility]- they will have the Vanco on tomorrow and the pt will have no copay- Isa Romaine was made aware- pt will receive a dose of vanco before she leaves today

## 2023-03-27 NOTE — CASE MANAGEMENT
Case Management Discharge Planning Note    Patient name Yale Pallas  Location [address] 203/-26 MRN [id number]  : 1950 Date 3/27/2023       Current Admission Date: 3/22/2023  Current Admission Diagnosis:Panniculitis   Patient Active Problem List    Diagnosis Date Noted   • Elevated lactic acid level 2023   • BMI 70 and over, adult ([facility] ) 2022   • Chronic atrial fibrillation ([facility] ) 2022   • History of Clostridioides difficile infection 2022   • Lymphedema of extremity 2021   • Other specified hypothyroidism 10/03/2020   • Mild episode of recurrent major depressive disorder ([facility] ) 10/03/2020   • Idiopathic chronic 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 10/03/2020   • Primary osteoarthritis involving multiple joints 10/03/2020   • Chronic diastolic congestive heart failure ([facility] ) 10/03/2020   • Panniculitis 10/03/2020   • Gastroesophageal reflux disease without esophagitis 10/03/2020   • Hx MRSA infection 2020   • Functional gait abnormality 2019   • Impaired mobility 2019   • Osteoarthritis of glenohumeral joint 2019   • Left ovarian cyst 2017   • Depression with anxiety 07/15/2017   • Anemia 2016   • Ambulatory dysfunction 2016   • Acute kidney injury superimposed on CKD ([facility] ) 2016   • Adjustment disorder 2013   • Irritable bowel syndrome 2012   • Left atrial enlargement 2012   • Left ventricular hypertrophy 2012   • Carpal tunnel syndrome 2012   • Gout 2012   • Hyperlipidemia 2012   • Symptomatic menopausal or female climacteric states 2012      LOS (days): 5  Geometric Mean LOS (GMLOS) (days): 2 90  Days to GMLOS:-1 8     OBJECTIVE:  Risk of Unplanned Readmission Score: 26 92         Current admission status: Inpatient   [address]    [address]  [facility]  [address] [address]  Phone: [phone] Fax: [address] [facility]  [address] [facility]  [address] [address]  Phone: [phone] Fax: [phone]    Primary Care Provider: Brigid Edmond MD    Primary Insurance: [facility]  Secondary Insurance: AARP    DISCHARGE DETAILS:    Discharge planning discussed with[de-identified] patient and Rick Garcia was called at 11:15 am  Freedom of Choice: Yes  Comments - Freedom of Choice: pt is active merari Mejia pt has caregivers via [employer]  CM contacted family/caregiver?: Yes  Were Treatment Team discharge recommendations reviewed with patient/caregiver?: Yes  Did patient/caregiver verbalize understanding of patient care needs?: Yes  Were patient/caregiver advised of the risks associated with not following Treatment Team discharge recommendations?: Yes    Contacts  Patient Contacts: Elena Mccarthy  Relationship to Patient[de-identified] Family (nephew)  Contact Method: Phone  Phone Number: [phone]  Reason/Outcome: Discharge [address]         Is the patient interested in [facility] at discharge?: Yes         Other Referral/Resources/Interventions Provided:  Interventions: Other (Specify), [facility]  Referral Comments: Edilma Rich was made aware of the d/c anmd avs faxed to [phone]-  Gavin Soto was called at 9:19 am and I spoke with Ricky Schneider and Fay her care giver can start at 3pm- no auth is needed for bls  3pm [facility]    Would you like to participate in our [address] service program?  : No - Declined    Treatment Team Recommendation: Home with [address] (home with [facility])  Discharge Destination Plan[de-identified] Home with [address] (home with Caregiver from [address] follow up- pt receives house calls)  Transport at Discharge : [facility]  Dispatcher Contacted: Yes  Number/Name of Dispatcher: [phone]  Transported by Elida and Unit #):  [facility] of Transport (Date): 03/27/23  ETA of Transport (Time): 1500     Transfer Mode: Stretcher        IMM Given (Date):: 03/27/23  IMM Given to[de-identified] Patient  Family notified[de-identified] nephew was called      pt and family are in agreement with the planned d/c for today & d/c plan    IMM reviewed with patient, patient agrees with discharge determination

## 2023-03-27 NOTE — PLAN OF CARE
Problem: Potential for Falls  Goal: Patient will remain free of falls  Description: INTERVENTIONS:  - Educate patient/family on patient safety including physical limitations  - Instruct patient to call for assistance with activity   - Consult OT/PT to assist with strengthening/mobility   - Keep Call bell within reach  - Keep bed low and locked with side rails adjusted as appropriate  - Keep care items and personal belongings within reach  - Initiate and maintain comfort rounds  - Make Fall Risk Sign visible to staff  - Offer Toileting every 2 Hours, in advance of need  - Initiate/Maintain bed alarm  - Obtain necessary fall risk management equipment  - Apply yellow socks and bracelet for high fall risk patients  - Consider moving patient to room near nurses station  Outcome: Adequate for Discharge     Problem: MOBILITY - ADULT  Goal: Maintain or return to baseline ADL function  Description: INTERVENTIONS:  - Educate patient/family on patient safety including physical limitations  - Instruct patient to call for assistance with activity   - Consult OT/PT to assist with strengthening/mobility   - Keep Call bell within reach  - Keep bed low and locked with side rails adjusted as appropriate  - Keep care items and personal belongings within reach  - Initiate and maintain comfort rounds  - Make Fall Risk Sign visible to staff  - Offer Toileting every 2 Hours, in advance of need  - Initiate/Maintain bed alarm  - Obtain necessary fall risk management equipment  - Apply yellow socks and bracelet for high fall risk patients  - Consider moving patient to room near nurses station  Outcome: Adequate for Discharge  Goal: Maintains/Returns to pre admission functional level  Description: INTERVENTIONS:  - Perform BMAT or MOVE assessment daily    - Set and communicate daily mobility goal to care team and patient/family/caregiver     - Collaborate with rehabilitation services on mobility goals if consulted  - Out of bed for toileting  - Record patient progress and toleration of activity level   Outcome: Adequate for Discharge     Problem: PAIN - ADULT  Goal: Verbalizes/displays adequate comfort level or baseline comfort level  Description: Interventions:  - Encourage patient to monitor pain and request assistance  - Assess pain using appropriate pain scale  - Administer analgesics based on type and severity of pain and evaluate response  - Implement non-pharmacological measures as appropriate and evaluate response  - Consider cultural and social influences on pain and pain management  - Notify physician/advanced practitioner if interventions unsuccessful or patient reports new pain  3/27/2023 1245 by Christopher Muhammad  Outcome: Adequate for Discharge  3/27/2023 1245 by Verdia Lesches Rehrig  Outcome: Progressing     Problem: INFECTION - ADULT  Goal: Absence or prevention of progression during hospitalization  Description: INTERVENTIONS:  - Assess and monitor for signs and symptoms of infection  - Monitor lab/diagnostic results  - Monitor all insertion sites, i e  indwelling lines, tubes, and drains  - Monitor endotracheal if appropriate and nasal secretions for changes in amount and color  - [place] appropriate cooling/warming therapies per order  - Administer medications as ordered  - Instruct and encourage patient and family to use good hand hygiene technique  - Identify and instruct in appropriate isolation precautions for identified infection/condition  Outcome: Adequate for Discharge     Problem: SAFETY ADULT  Goal: Patient will remain free of falls  Description: INTERVENTIONS:  - Educate patient/family on patient safety including physical limitations  - Instruct patient to call for assistance with activity   - Consult OT/PT to assist with strengthening/mobility   - Keep Call bell within reach  - Keep bed low and locked with side rails adjusted as appropriate  - Keep care items and personal belongings within reach  - Initiate and maintain comfort rounds  - Make Fall Risk Sign visible to staff  - Offer Toileting every 2 Hours, in advance of need  - Initiate/Maintain bed alarm  - Obtain necessary fall risk management equipment  - Apply yellow socks and bracelet for high fall risk patients  - Consider moving patient to room near nurses station  Outcome: Adequate for Discharge  Goal: Maintain or return to baseline ADL function  Description: INTERVENTIONS:  - Educate patient/family on patient safety including physical limitations  - Instruct patient to call for assistance with activity   - Consult OT/PT to assist with strengthening/mobility   - Keep Call bell within reach  - Keep bed low and locked with side rails adjusted as appropriate  - Keep care items and personal belongings within reach  - Initiate and maintain comfort rounds  - Make Fall Risk Sign visible to staff  - Offer Toileting every 2 Hours, in advance of need  - Initiate/Maintain bed alarm  - Obtain necessary fall risk management equipment  - Apply yellow socks and bracelet for high fall risk patients  - Consider moving patient to room near nurses station  Outcome: Adequate for Discharge  Goal: Maintains/Returns to pre admission functional level  Description: INTERVENTIONS:  - Perform BMAT or MOVE assessment daily    - Set and communicate daily mobility goal to care team and patient/family/caregiver     - Collaborate with rehabilitation services on mobility goals if consulted  - Out of bed for toileting  - Record patient progress and toleration of activity level   Outcome: Adequate for Discharge     Problem: DISCHARGE PLANNING  Goal: Discharge to home or other facility with appropriate resources  Description: INTERVENTIONS:  - Identify barriers to discharge w/patient and caregiver  - Arrange for needed discharge resources and transportation as appropriate  - Identify discharge learning needs (meds, wound care, etc )  - Arrange for interpretive services to assist at discharge as needed  - Refer to Case Management Department for coordinating discharge planning if the patient needs post-hospital services based on physician/advanced practitioner order or complex needs related to functional status, cognitive ability, or social support system  Outcome: Adequate for Discharge     Problem: Knowledge Deficit  Goal: Patient/family/caregiver demonstrates understanding of disease process, treatment plan, medications, and discharge instructions  Description: Complete learning assessment and assess knowledge base    Interventions:  - Provide teaching at level of understanding  - Provide teaching via preferred learning methods  Outcome: Adequate for Discharge     Problem: Prexisting or High Potential for Compromised Skin Integrity  Goal: Skin integrity is maintained or improved  Description: INTERVENTIONS:  - Identify patients at risk for skin breakdown  - Assess and monitor skin integrity  - Assess and monitor nutrition and hydration status  - Monitor labs   - Assess for incontinence   - Turn and reposition patient  - Assist with mobility/ambulation  - Relieve pressure over bony prominences  - Avoid friction and shearing  - Provide appropriate hygiene as needed including keeping skin clean and dry  - Evaluate need for skin moisturizer/barrier cream  - Collaborate with interdisciplinary team   - Patient/family teaching  - Consider wound care consult   Outcome: Adequate for Discharge

## 2023-03-27 NOTE — UTILIZATION REVIEW
NOTIFICATION OF ADMISSION DISCHARGE   This is a Notification of Discharge from [address]  Please be advised that this patient has been discharge from our facility  Below you will find the admission and discharge date and time including the patient’s disposition  UTILIZATION REVIEW CONTACT:  Vel Acuña  Utilization   Network Utilization Review Department  Phone: [phone] carefully listen to the prompts  All voicemails are confidential   Email: [email or web address]     ADMISSION INFORMATION  PRESENTATION DATE: 3/22/2023  5:09 PM  OBERVATION ADMISSION DATE:   INPATIENT ADMISSION DATE: 3/22/23  6:48 PM   DISCHARGE DATE: 3/27/2023  3:22 PM   DISPOSITION:Home with Home Health Care    IMPORTANT INFORMATION:  Send all requests for admission clinical reviews, approved or denied determinations and any other requests to dedicated fax number below belonging to the campus where the patient is receiving treatment   List of dedicated fax numbers:  [address] DENIALS (Administrative/Medical Necessity) [phone]   [address] (Maternity/NICU/Pediatrics) [phone]   [facility] [phone]   [facility] 87 [phone]   [address] [phone]   220 [facility] [phone]   90 [facility] [phone]   [address] [place] [address] 119 [phone]   [facility]  [phone]   4057 [facility] [phone]   412 [facility] [address]  [phone]

## 2023-03-27 NOTE — PLAN OF CARE
Problem: Potential for Falls  Goal: Patient will remain free of falls  Description: INTERVENTIONS:  - Educate patient/family on patient safety including physical limitations  - Instruct patient to call for assistance with activity   - Consult OT/PT to assist with strengthening/mobility   - Keep Call bell within reach  - Keep bed low and locked with side rails adjusted as appropriate  - Keep care items and personal belongings within reach  - Initiate and maintain comfort rounds  - Make Fall Risk Sign visible to staff  - Offer Toileting every 2 Hours, in advance of need  - Initiate/Maintain bed alarm  - Obtain necessary fall risk management equipment  - Apply yellow socks and bracelet for high fall risk patients  - Consider moving patient to room near nurses station  Outcome: Progressing     Problem: MOBILITY - ADULT  Goal: Maintain or return to baseline ADL function  Description: INTERVENTIONS:  - Educate patient/family on patient safety including physical limitations  - Instruct patient to call for assistance with activity   - Consult OT/PT to assist with strengthening/mobility   - Keep Call bell within reach  - Keep bed low and locked with side rails adjusted as appropriate  - Keep care items and personal belongings within reach  - Initiate and maintain comfort rounds  - Make Fall Risk Sign visible to staff  - Offer Toileting every 2 Hours, in advance of need  - Initiate/Maintain bed alarm  - Obtain necessary fall risk management equipment  - Apply yellow socks and bracelet for high fall risk patients  - Consider moving patient to room near nurses station  Outcome: Progressing  Goal: Maintains/Returns to pre admission functional level  Description: INTERVENTIONS:  - Perform BMAT or MOVE assessment daily    - Set and communicate daily mobility goal to care team and patient/family/caregiver  - Collaborate with rehabilitation services on mobility goals if consulted  - Perform Range of Motion 3 times a day    - Reposition patient every 2 hours    - Dangle patient 0 times a day  - Stand patient 0 times a day  - Ambulate patient 0 times a day  - Out of bed to chair 0 times a day   - Out of bed for meals 0 times a day  - Out of bed for toileting  - Record patient progress and toleration of activity level   Outcome: Progressing     Problem: PAIN - ADULT  Goal: Verbalizes/displays adequate comfort level or baseline comfort level  Description: Interventions:  - Encourage patient to monitor pain and request assistance  - Assess pain using appropriate pain scale  - Administer analgesics based on type and severity of pain and evaluate response  - Implement non-pharmacological measures as appropriate and evaluate response  - Consider cultural and social influences on pain and pain management  - Notify physician/advanced practitioner if interventions unsuccessful or patient reports new pain  Outcome: Progressing     Problem: INFECTION - ADULT  Goal: Absence or prevention of progression during hospitalization  Description: INTERVENTIONS:  - Assess and monitor for signs and symptoms of infection  - Monitor lab/diagnostic results  - Monitor all insertion sites, i e  indwelling lines, tubes, and drains  - Monitor endotracheal if appropriate and nasal secretions for changes in amount and color  - [place] appropriate cooling/warming therapies per order  - Administer medications as ordered  - Instruct and encourage patient and family to use good hand hygiene technique  - Identify and instruct in appropriate isolation precautions for identified infection/condition  Outcome: Progressing     Problem: SAFETY ADULT  Goal: Patient will remain free of falls  Description: INTERVENTIONS:  - Educate patient/family on patient safety including physical limitations  - Instruct patient to call for assistance with activity   - Consult OT/PT to assist with strengthening/mobility   - Keep Call bell within reach  - Keep bed low and locked with side rails adjusted as appropriate  - Keep care items and personal belongings within reach  - Initiate and maintain comfort rounds  - Make Fall Risk Sign visible to staff  - Offer Toileting every 2 Hours, in advance of need  - Initiate/Maintain bed alarm  - Obtain necessary fall risk management equipment  - Apply yellow socks and bracelet for high fall risk patients  - Consider moving patient to room near nurses station  Outcome: Progressing  Goal: Maintain or return to baseline ADL function  Description: INTERVENTIONS:  - Educate patient/family on patient safety including physical limitations  - Instruct patient to call for assistance with activity   - Consult OT/PT to assist with strengthening/mobility   - Keep Call bell within reach  - Keep bed low and locked with side rails adjusted as appropriate  - Keep care items and personal belongings within reach  - Initiate and maintain comfort rounds  - Make Fall Risk Sign visible to staff  - Offer Toileting every 2 Hours, in advance of need  - Initiate/Maintain bed alarm  - Obtain necessary fall risk management equipment  - Apply yellow socks and bracelet for high fall risk patients  - Consider moving patient to room near nurses station  Outcome: Progressing  Goal: Maintains/Returns to pre admission functional level  Description: INTERVENTIONS:  - Perform BMAT or MOVE assessment daily    - Set and communicate daily mobility goal to care team and patient/family/caregiver  - Collaborate with rehabilitation services on mobility goals if consulted  - Perform Range of Motion 3 times a day  - Reposition patient every 2 hours    - Dangle patient 0 times a day  - Stand patient 0 times a day  - Ambulate patient 0 times a day  - Out of bed to chair 0 times a day   - Out of bed for meals 0 times a day  - Out of bed for toileting  - Record patient progress and toleration of activity level   Outcome: Progressing     Problem: DISCHARGE PLANNING  Goal: Discharge to home or other facility with appropriate resources  Description: INTERVENTIONS:  - Identify barriers to discharge w/patient and caregiver  - Arrange for needed discharge resources and transportation as appropriate  - Identify discharge learning needs (meds, wound care, etc )  - Arrange for interpretive services to assist at discharge as needed  - Refer to Case Management Department for coordinating discharge planning if the patient needs post-hospital services based on physician/advanced practitioner order or complex needs related to functional status, cognitive ability, or social support system  Outcome: Progressing     Problem: Knowledge Deficit  Goal: Patient/family/caregiver demonstrates understanding of disease process, treatment plan, medications, and discharge instructions  Description: Complete learning assessment and assess knowledge base    Interventions:  - Provide teaching at level of understanding  - Provide teaching via preferred learning methods  Outcome: Progressing     Problem: Prexisting or High Potential for Compromised Skin Integrity  Goal: Skin integrity is maintained or improved  Description: INTERVENTIONS:  - Identify patients at risk for skin breakdown  - Assess and monitor skin integrity  - Assess and monitor nutrition and hydration status  - Monitor labs   - Assess for incontinence   - Turn and reposition patient  - Assist with mobility/ambulation  - Relieve pressure over bony prominences  - Avoid friction and shearing  - Provide appropriate hygiene as needed including keeping skin clean and dry  - Evaluate need for skin moisturizer/barrier cream  - Collaborate with interdisciplinary team   - Patient/family teaching  - Consider wound care consult   Outcome: Progressing

## 2023-06-12 ENCOUNTER — APPOINTMENT (EMERGENCY)
Dept: CT IMAGING | Facility: HOSPITAL | Age: 73
End: 2023-06-12
Payer: COMMERCIAL

## 2023-06-12 ENCOUNTER — HOSPITAL ENCOUNTER (EMERGENCY)
Facility: HOSPITAL | Age: 73
Discharge: HOME | End: 2023-06-13
Attending: EMERGENCY MEDICINE
Payer: COMMERCIAL

## 2023-06-12 VITALS
HEART RATE: 67 BPM | BODY MASS INDEX: 51.91 KG/M2 | DIASTOLIC BLOOD PRESSURE: 89 MMHG | OXYGEN SATURATION: 96 % | SYSTOLIC BLOOD PRESSURE: 123 MMHG | HEIGHT: 63 IN | RESPIRATION RATE: 18 BRPM | TEMPERATURE: 96.9 F | WEIGHT: 293 LBS

## 2023-06-12 DIAGNOSIS — M54.2 NECK PAIN: Primary | ICD-10-CM

## 2023-06-12 DIAGNOSIS — R10.9 ABDOMINAL PAIN: ICD-10-CM

## 2023-06-12 LAB
ALBUMIN SERPL BCP-MCNC: 3.2 G/DL (ref 3.5–5)
ALP SERPL-CCNC: 111 U/L (ref 34–104)
ALT SERPL W P-5'-P-CCNC: 12 U/L (ref 7–52)
ANION GAP SERPL CALCULATED.3IONS-SCNC: 9 MMOL/L (ref 4–13)
APTT PPP: 45 SECONDS (ref 23–37)
AST SERPL W P-5'-P-CCNC: 19 U/L (ref 13–39)
BASOPHILS # BLD AUTO: 0.05 THOUSANDS/ÂΜL (ref 0–0.1)
BASOPHILS NFR BLD AUTO: 1 % (ref 0–1)
BILIRUB SERPL-MCNC: 0.78 MG/DL (ref 0.2–1)
BUN SERPL-MCNC: 24 MG/DL (ref 5–25)
CALCIUM ALBUM COR SERPL-MCNC: 9.8 MG/DL (ref 8.3–10.1)
CALCIUM SERPL-MCNC: 9.2 MG/DL (ref 8.4–10.2)
CHLORIDE SERPL-SCNC: 100 MMOL/L (ref 96–108)
CO2 SERPL-SCNC: 29 MMOL/L (ref 21–32)
CREAT SERPL-MCNC: 1.02 MG/DL (ref 0.6–1.3)
EOSINOPHIL # BLD AUTO: 0.26 THOUSAND/ÂΜL (ref 0–0.61)
EOSINOPHIL NFR BLD AUTO: 3 % (ref 0–6)
ERYTHROCYTE [DISTWIDTH] IN BLOOD BY AUTOMATED COUNT: 18 % (ref 11.6–15.1)
GFR SERPL CREATININE-BSD FRML MDRD: 55 ML/MIN/1.73SQ M
GLUCOSE SERPL-MCNC: 110 MG/DL (ref 65–140)
HCT VFR BLD AUTO: 40.4 % (ref 34.8–46.1)
HGB BLD-MCNC: 11.9 G/DL (ref 11.5–15.4)
IMM GRANULOCYTES # BLD AUTO: 0.03 THOUSAND/UL (ref 0–0.2)
IMM GRANULOCYTES NFR BLD AUTO: 0 % (ref 0–2)
INR PPP: 2.27 (ref 0.84–1.19)
LIPASE SERPL-CCNC: 34 U/L (ref 11–82)
LYMPHOCYTES # BLD AUTO: 1.28 THOUSANDS/ÂΜL (ref 0.6–4.47)
LYMPHOCYTES NFR BLD AUTO: 13 % (ref 14–44)
MAGNESIUM SERPL-MCNC: 2.1 MG/DL (ref 1.9–2.7)
MCH RBC QN AUTO: 24.8 PG (ref 26.8–34.3)
MCHC RBC AUTO-ENTMCNC: 29.5 G/DL (ref 31.4–37.4)
MCV RBC AUTO: 84 FL (ref 82–98)
MONOCYTES # BLD AUTO: 0.48 THOUSAND/ÂΜL (ref 0.17–1.22)
MONOCYTES NFR BLD AUTO: 5 % (ref 4–12)
NEUTROPHILS # BLD AUTO: 7.91 THOUSANDS/ÂΜL (ref 1.85–7.62)
NEUTS SEG NFR BLD AUTO: 78 % (ref 43–75)
NRBC BLD AUTO-RTO: 0 /100 WBCS
PLATELET # BLD AUTO: 443 THOUSANDS/UL (ref 149–390)
PMV BLD AUTO: 8.6 FL (ref 8.9–12.7)
POTASSIUM SERPL-SCNC: 4.4 MMOL/L (ref 3.5–5.3)
PROT SERPL-MCNC: 7.9 G/DL (ref 6.4–8.4)
PROTHROMBIN TIME: 24.9 SECONDS (ref 11.6–14.5)
RBC # BLD AUTO: 4.8 MILLION/UL (ref 3.81–5.12)
SODIUM SERPL-SCNC: 138 MMOL/L (ref 135–147)
WBC # BLD AUTO: 10.01 THOUSAND/UL (ref 4.31–10.16)

## 2023-06-12 PROCEDURE — 70498 CT ANGIOGRAPHY NECK: CPT

## 2023-06-12 PROCEDURE — 70496 CT ANGIOGRAPHY HEAD: CPT

## 2023-06-12 PROCEDURE — 74177 CT ABD & PELVIS W/CONTRAST: CPT

## 2023-06-12 PROCEDURE — 80053 COMPREHEN METABOLIC PANEL: CPT | Performed by: EMERGENCY MEDICINE

## 2023-06-12 PROCEDURE — 36415 COLL VENOUS BLD VENIPUNCTURE: CPT | Performed by: EMERGENCY MEDICINE

## 2023-06-12 PROCEDURE — 83735 ASSAY OF MAGNESIUM: CPT | Performed by: EMERGENCY MEDICINE

## 2023-06-12 PROCEDURE — 85610 PROTHROMBIN TIME: CPT | Performed by: EMERGENCY MEDICINE

## 2023-06-12 PROCEDURE — G1004 CDSM NDSC: HCPCS

## 2023-06-12 PROCEDURE — 85730 THROMBOPLASTIN TIME PARTIAL: CPT | Performed by: EMERGENCY MEDICINE

## 2023-06-12 PROCEDURE — 85025 COMPLETE CBC W/AUTO DIFF WBC: CPT | Performed by: EMERGENCY MEDICINE

## 2023-06-12 PROCEDURE — 83690 ASSAY OF LIPASE: CPT | Performed by: EMERGENCY MEDICINE

## 2023-06-12 RX ORDER — HYDROMORPHONE HCL/PF 1 MG/ML
0.5 SYRINGE (ML) INJECTION ONCE
Status: COMPLETED | OUTPATIENT
Start: 2023-06-12 | End: 2023-06-12

## 2023-06-12 RX ORDER — WARFARIN SODIUM 2 MG/1
2 TABLET ORAL
Status: COMPLETED | OUTPATIENT
Start: 2023-06-12 | End: 2023-06-12

## 2023-06-12 RX ORDER — METHOCARBAMOL 500 MG/1
500 TABLET, FILM COATED ORAL 2 TIMES DAILY
Qty: 20 TABLET | Refills: 0 | Status: SHIPPED | OUTPATIENT
Start: 2023-06-12

## 2023-06-12 RX ORDER — FENTANYL CITRATE 50 UG/ML
50 INJECTION, SOLUTION INTRAMUSCULAR; INTRAVENOUS ONCE
Status: COMPLETED | OUTPATIENT
Start: 2023-06-12 | End: 2023-06-12

## 2023-06-12 RX ADMIN — HYDROMORPHONE HYDROCHLORIDE 0.5 MG: 1 INJECTION, SOLUTION INTRAMUSCULAR; INTRAVENOUS; SUBCUTANEOUS at 15:18

## 2023-06-12 RX ADMIN — WARFARIN SODIUM 2 MG: 2 TABLET ORAL at 21:34

## 2023-06-12 RX ADMIN — IOHEXOL 100 ML: 350 INJECTION, SOLUTION INTRAVENOUS at 17:38

## 2023-06-12 RX ADMIN — FENTANYL CITRATE 50 MCG: 50 INJECTION, SOLUTION INTRAMUSCULAR; INTRAVENOUS at 12:06

## 2023-06-12 RX ADMIN — HYDROMORPHONE HYDROCHLORIDE 0.5 MG: 1 INJECTION, SOLUTION INTRAMUSCULAR; INTRAVENOUS; SUBCUTANEOUS at 19:25

## 2023-06-12 NOTE — ED PROVIDER NOTES
History  Chief Complaint   Patient presents with   • Neck Pain     ongoing for [id number]  Intensified over weekend  Starts in neck and radiates up back of head  Feels like there is a rubber band around her head    • Abdominal Pain     Left sided abdominal pain started [id number]  nausea from pain     69-year-old female with history of A-fib, CHF, right-sided panniculitis, diverticulitis, spondylosis of the cervical spine presents with multiple complaints  Patient complains of right-sided neck and headache worsening over the last 3 weeks  Patient describes sharp, stabbing pain to the right neck into the right head and around the front of the head  She denies weakness or numbness  Pain is worse with flexing forward  She is also complaining of 1 week history of left-sided abdominal pain  Patient describes pain in the left lower quadrant with some mild distention of her abdomen  She denies nausea vomiting or diarrhea  Prior to Admission Medications   Prescriptions Last Dose Informant Patient Reported? Taking? DULoxetine (CYMBALTA) 20 mg capsule  Self No No   Sig: Take 1 capsule (20 mg total) by mouth daily   Neomycin-Bacitracin-Polymyxin (HCA TRIPLE ANTIBIOTIC OINTMENT EX)  Self Yes No   Sig: Apply topically To abdominal fold twice per day     Probiotic Product (PROBIOTIC DAILY PO)  Self Yes No   Sig: Take 1 each by mouth in the morning   Patient not taking: Reported on 12/14/2022   acetaminophen (TYLENOL) 650 mg CR tablet  Self Yes No   Sig: Take 650 mg by mouth every 8 (eight) hours as needed (for osteoarthritis)   allopurinol (ZYLOPRIM) 100 mg tablet  Self No No   Sig: Take 1 tablet (100 mg total) by mouth daily   furosemide (LASIX) 40 mg tablet  Self Yes No   Sig: Take 40 mg by mouth daily Hold for SBP < 100   levothyroxine 125 mcg tablet  Self Yes No   Sig: Take 125 mcg by mouth daily   mupirocin (BACTROBAN) 2 % ointment   No No   Sig: Apply topically 3 (three) times a day   polyethylene glycol (MIRALAX) 17 g packet  Self No No   Sig: Take 17 g by mouth daily   Patient not taking: Reported on 12/14/2022   senna-docusate sodium (SENOKOT S) 8 6-50 mg per tablet  Self No No   Sig: Take 1 tablet by mouth daily at bedtime   Patient not taking: Reported on 12/14/2022   triamcinolone (KENALOG) 0 1 % cream  Self Yes No   Sig: Apply 0 1 application topically 2 (two) times a day   warfarin (COUMADIN) 2 mg tablet  Self No No   Sig: Take 1 tablet (2 mg total) by mouth every other day Monday, Wednesday, Friday, sunday      Facility-Administered Medications: None       Past Medical History:   Diagnosis Date   • Atrial fibrillation ([place] [address] )    • Bladder stone    • C  difficile colitis    • Cellulitis    • CHF (congestive heart failure) ([place] [address] )    • Depression with anxiety    • Disease of thyroid gland    • Diverticulitis    • Enterocolitis    • History of abnormal cervical Pap smear    • Kidney stone    • Lymphedema    • Menopause     Age 53   • Morbid obesity with BMI of 70 and over, adult ([place] [address] )    • MRSA (methicillin resistant Staphylococcus aureus)     abdominal wound   • Osteoarthritis    • Phlebitis     left lower leg   • Spondylolysis        Past Surgical History:   Procedure Laterality Date   • APPENDECTOMY     • CARPAL TUNNEL RELEASE     • CHOLECYSTECTOMY     • CYSTOSCOPY      stent   • FOOT SURGERY  1982    bone spur   • KNEE SURGERY     • WISDOM TOOTH EXTRACTION         Family History   Problem Relation Age of Onset   • Heart failure Mother    • Heart disease Mother    • Lymphoma Mother    • Seizures Mother    • Kidney disease Father    • Heart disease Father    • Heart failure Father    • Diabetes type II Father    • Diabetes type II Sister    • Diabetes type II Brother    • Uterine cancer Paternal Aunt    • Breast cancer Neg Hx    • Colon cancer Neg Hx    • Ovarian cancer Neg Hx      I have reviewed and agree with the history as documented      E-Cigarette/Vaping   • E-Cigarette Use Never User E-Cigarette/Vaping Substances   • Nicotine No    • THC No    • CBD No    • Flavoring No    • Other No    • Unknown No      Social History     Tobacco Use   • Smoking status: Former     Packs/day: 5 00     Years: 3 00     Total pack years: 15 00     Types: Cigarettes     Start date: 1967     Quit date: 1970     Years since quittin 9   • Smokeless tobacco: Never   • Tobacco comments:     5 packs/day until 5 (age 17-19) - As per [employer]    Vaping Use   • Vaping Use: Never used   Substance Use Topics   • Alcohol use: Not Currently   • Drug use: Not Currently     Types: Marijuana     Comment: As a teen - As per [employer]        Review of Systems    Physical Exam  Physical Exam  Vitals and nursing note reviewed  Constitutional:       Appearance: She is well-developed  HENT:      Head: Normocephalic and atraumatic  Eyes:      Conjunctiva/sclera: Conjunctivae normal       Pupils: Pupils are equal, round, and reactive to light  Neck:      Trachea: No tracheal deviation  Cardiovascular:      Rate and Rhythm: Normal rate and regular rhythm  Heart sounds: Normal heart sounds  No murmur heard  Pulmonary:      Effort: Pulmonary effort is normal  No respiratory distress  Breath sounds: Normal breath sounds  No wheezing or rales  Abdominal:      General: Bowel sounds are normal  There is no distension  Palpations: Abdomen is soft  Tenderness: There is abdominal tenderness in the left lower quadrant  Musculoskeletal:         General: No deformity  Cervical back: Normal range of motion and neck supple  Comments: No C-spine step-offs or deformities, mild right-sided pain, nonreproducible   Skin:     General: Skin is warm and dry  Capillary Refill: Capillary refill takes less than 2 seconds  Comments: No cellulitis at the area of concern   Neurological:      General: No focal deficit present        Mental Status: She is alert and oriented to person, place, and time  Sensory: No sensory deficit     Psychiatric:         Mood and Affect: Mood normal          Judgment: Judgment normal          Vital Signs  ED Triage Vitals [06/12/23 1144]   Temperature Pulse Respirations Blood Pressure SpO2   (!) 96 9 °F (36 1 °C) 71 18 118/60 97 %      Temp src Heart Rate Source Patient Position - Orthostatic VS BP Location FiO2 (%)   -- Monitor Lying Left arm --      Pain Score       10 - Worst Possible Pain           Vitals:    06/12/23 1341 06/12/23 1415 06/12/23 1600 06/12/23 1851   BP: 111/60 111/53 103/53 123/89   Pulse: 71 56 65 67   Patient Position - Orthostatic VS: Lying Lying Lying Lying         Visual Acuity  Visual Acuity    Flowsheet Row Most Recent Value   L Pupil Size (mm) 2   R Pupil Size (mm) 2          ED Medications  Medications   fentanyl citrate (PF) 100 MCG/2ML 50 mcg (50 mcg Intravenous Given 6/12/23 1206)   HYDROmorphone (DILAUDID) injection 0 5 mg (0 5 mg Intravenous Given 6/12/23 1518)   iohexol (OMNIPAQUE) 350 MG/ML injection (SINGLE-DOSE) 100 mL (100 mL Intravenous Given 6/12/23 1738)   HYDROmorphone (DILAUDID) injection 0 5 mg (0 5 mg Intravenous Given 6/12/23 1925)   warfarin (COUMADIN) tablet 2 mg (2 mg Oral Given 6/12/23 2134)       Diagnostic Studies  Results Reviewed     Procedure Component Value Units Date/Time    Lipase [[id number]]  (Normal) Collected: 06/12/23 1147    Lab Status: Final result Specimen: Blood from Arm, Left Updated: 06/12/23 1344     Lipase 34 u/L     Comprehensive metabolic panel [[id number]]  (Abnormal) Collected: 06/12/23 1146    Lab Status: Final result Specimen: Blood from Arm, Left Updated: 06/12/23 1316     Sodium 138 mmol/L      Potassium 4 4 mmol/L      Chloride 100 mmol/L      CO2 29 mmol/L      ANION GAP 9 mmol/L      BUN 24 mg/dL      Creatinine 1 02 mg/dL      Glucose 110 mg/dL      Calcium 9 2 mg/dL      Corrected Calcium 9 8 mg/dL      AST 19 U/L      ALT 12 U/L      Alkaline Phosphatase 111 U/L      Total Protein 7 9 g/dL      Albumin 3 2 g/dL      Total Bilirubin 0 78 mg/dL      eGFR 55 ml/min/1 73sq m     Narrative:      [place] guidelines for Chronic Kidney Disease (CKD):   •  Stage 1 with normal or high GFR (GFR > 90 mL/min/1 73 square meters)  •  Stage 2 Mild CKD (GFR = 60-89 mL/min/1 73 square meters)  •  Stage 3A Moderate CKD (GFR = 45-59 mL/min/1 73 square meters)  •  Stage 3B Moderate CKD (GFR = 30-44 mL/min/1 73 square meters)  •  Stage 4 Severe CKD (GFR = 15-29 mL/min/1 73 square meters)  •  Stage 5 End Stage CKD (GFR <15 mL/min/1 73 square meters)  Note: GFR calculation is accurate only with a steady state creatinine    Magnesium [[id number]]  (Normal) Collected: 06/12/23 1146    Lab Status: Final result Specimen: Blood from Arm, Left Updated: 06/12/23 1214     Magnesium 2 1 mg/dL     Protime-INR [[id number]]  (Abnormal) Collected: 06/12/23 1146    Lab Status: Final result Specimen: Blood from Arm, Left Updated: 06/12/23 1210     Protime 24 9 seconds      INR 2 27    APTT [[id number]]  (Abnormal) Collected: 06/12/23 1146    Lab Status: Final result Specimen: Blood from Arm, Left Updated: 06/12/23 1210     PTT 45 seconds     CBC and differential [[id number]]  (Abnormal) Collected: 06/12/23 1146    Lab Status: Final result Specimen: Blood from Arm, Left Updated: 06/12/23 1200     WBC 10 01 Thousand/uL      RBC 4 80 Million/uL      Hemoglobin 11 9 g/dL      Hematocrit 40 4 %      MCV 84 fL      MCH 24 8 pg      MCHC 29 5 g/dL      RDW 18 0 %      MPV 8 6 fL      Platelets 751 Thousands/uL      nRBC 0 /100 WBCs      Neutrophils Relative 78 %      Immat GRANS % 0 %      Lymphocytes Relative 13 %      Monocytes Relative 5 %      Eosinophils Relative 3 %      Basophils Relative 1 %      Neutrophils Absolute 7 91 Thousands/µL      Immature Grans Absolute 0 03 Thousand/uL      Lymphocytes Absolute 1 28 Thousands/µL      Monocytes Absolute 0 48 Thousand/µL      Eosinophils Absolute 0 26 Thousand/µL Basophils Absolute 0 05 Thousands/µL                  CTA head and neck with and without contrast   Final Result by Karina Sorensen MD (06/12 2024)      No acute vascular abnormality  No flow-limiting stenosis  No acute intracranial pathology  Workstation performed: [id number]         CT abdomen pelvis with contrast   Final Result by Aisha Bryan MD (06/12 1910)   1  Diverticular disease without evidence of diverticulitis or other findings to account for left lower quadrant pain  2  Stable left renal atrophy  Right nonobstructive nephrolithiasis  Workstation performed: [id number]                    Procedures  Procedures         ED Course  ED Course as of 06/13/23 1209   Mon Jun 12, 2023   1335 Patient is unable to fit in our CT scanner due to inadequate bore size  We will try to find alternative arrangements to have patient CAT scan and performed  [address] Junie Davison previous use  House supervisor, care management aware patient requires transport  SBIRT 22yo+    Flowsheet Row Most Recent Value   Initial Alcohol Screen: US AUDIT-C     1  How often do you have a drink containing alcohol? 0 Filed at: 06/12/2023 1144   2  How many drinks containing alcohol do you have on a typical day you are drinking? 0 Filed at: 06/12/2023 1144   3a  Male UNDER 65: How often do you have five or more drinks on one occasion? 0 Filed at: 06/12/2023 1144   3b  FEMALE Any Age, or MALE 65+: How often do you have 4 or more drinks on one occassion? 0 Filed at: 06/12/2023 1144   Audit-C Score 0 Filed at: 06/12/2023 1144   JYOTI: How many times in the past year have you    Used an illegal drug or used a prescription medication for non-medical reasons?  Never Filed at: 06/12/2023 1144                    Medical Decision Making  13-year-old female presents with multiple complaints  Suspect cervicalgia as cause for her 3 weeks of right-sided neck pain, will get CT as patient does have vascular risk factors to rule out differential diagnosis of vertebral artery dissection  No history of trauma  No weakness or numbness to suggest spinal cord or severe radiculopathy  Considered zoster however no rash, no hyperalgesia  Postoperative left-sided tenderness and pain, and mild distention although exam somewhat limited due to morbid obesity  Differential includes diverticulitis, obstruction, constipation, intra-abdominal abscess  No evidence of cellulitis superficially    Require CT of the head and neck, as well as abdomen pelvis to rule out the above  We will give low-dose fentanyl as any degree of apnea would be very detrimental to the patient and reduce fentanyl as needed  She currently rates her pain 10 out of 10 however is able to carry on a conversation comfortably with full sentences, no wincing, et [place] Zaida  While I do believe patient is experiencing pain this is not something needs to be addressed quickly but rather safely bring her pain to a tolerable level        Abdominal pain: acute illness or injury  Neck pain: acute illness or injury  Amount and/or Complexity of Data Reviewed  Labs: ordered  Decision-making details documented in ED Course  Radiology: ordered and independent interpretation performed  Decision-making details documented in ED Course  Risk  Prescription drug management  Disposition  Final diagnoses:   Neck pain   Abdominal pain     Time reflects when diagnosis was documented in both MDM as applicable and the Disposition within this note     Time User Action Codes Description Comment    6/12/2023  8:44 PM Salina Georges Add [M54 2] Neck pain     6/12/2023  8:44 PM Salina Gonzalez [R10 9] Abdominal pain       ED Disposition     ED Disposition   Discharge    Condition   Stable    Date/Time   Mon Jun 12, 2023  8:45 PM    Comment   Stevo Finger discharge to home/self care                 Follow-up Information     Follow up With Specialties Details Why Contact Info    Sharath Joe MD Nephrology, Internal Medicine Schedule an appointment as soon as possible for a visit   [address] [facility] [address]  [phone]            Discharge Medication List as of 6/13/2023  9:35 AM      START taking these medications    Details   methocarbamol (ROBAXIN) 500 mg tablet Take 1 tablet (500 mg total) by mouth 2 (two) times a day, Starting Mon 6/12/2023, Normal         CONTINUE these medications which have NOT CHANGED    Details   acetaminophen (TYLENOL) 650 mg CR tablet Take 650 mg by mouth every 8 (eight) hours as needed (for osteoarthritis), Historical Med      allopurinol (ZYLOPRIM) 100 mg tablet Take 1 tablet (100 mg total) by mouth daily, Starting Mon 9/30/2019, Normal      DULoxetine (CYMBALTA) 20 mg capsule Take 1 capsule (20 mg total) by mouth daily, Starting Wed 8/15/2018, Normal      furosemide (LASIX) 40 mg tablet Take 40 mg by mouth daily Hold for SBP < 100, Historical Med      levothyroxine 125 mcg tablet Take 125 mcg by mouth daily, Historical Med      mupirocin (BACTROBAN) 2 % ointment Apply topically 3 (three) times a day, Starting Mon 3/27/2023, Normal      Neomycin-Bacitracin-Polymyxin (HCA TRIPLE ANTIBIOTIC OINTMENT EX) Apply topically To abdominal fold twice per day , Historical Med      polyethylene glycol (MIRALAX) 17 g packet Take 17 g by mouth daily, Starting Tue 9/27/2022, No Print      Probiotic Product (PROBIOTIC DAILY PO) Take 1 each by mouth in the morning, Historical Med      senna-docusate sodium (SENOKOT S) 8 6-50 mg per tablet Take 1 tablet by mouth daily at bedtime, Starting Tue 9/27/2022, No Print      triamcinolone (KENALOG) 0 1 % cream Apply 0 1 application topically 2 (two) times a day, Starting Thu 12/16/2021, Historical Med      warfarin (COUMADIN) 2 mg tablet Take 1 tablet (2 mg total) by mouth every other day Monday, Wednesday, Friday, sunday, Starting Tue 2/22/2022, No Print             No discharge procedures on file      PDMP Review       Value Time User    PDMP Reviewed  Yes 5/27/2022 11:10 PM Anjelica Rg PA-C          ED Provider  Electronically Signed by           Renee Savage DO  06/13/23 1728

## 2023-06-12 NOTE — ED NOTES
Patient transported to [facility] for ambulance transport to Fortunato Rodrigez RN  06/12/23 4658

## 2023-06-12 NOTE — CASE MANAGEMENT
Case Management Progress Note    Patient name Yohana Kilpatrick  Location ED 02/ED 02 MRN [id number]  : 1950 Date 2023       LOS (days): 0  Geometric Mean LOS (GMLOS) (days):   Days to GMLOS:        OBJECTIVE:        Current admission status: Emergency  Preferred Pharmacy:    [address] [address]  Phone: [phone] Fax: [phone]    [facility] [place] [address] [address]  [address]  [facility] [address]  Phone: [phone] Fax: [phone]    Primary Care Provider: Zo Servin MD    Primary Insurance: [facility]  Secondary Insurance: AAR    PROGRESS NOTE:  Cm was requested to set up transport for this pt to go to [address] [place] [address] for a bariatric ct scan and then return back to the [facility] ED-pt was made aware and is in agreement- cm spoke with Francisco Graff- ct dept at [facility] is waiting for the pt- cm made Ardie Board aware she needs to give a report to the ct dept- trip was ordered through round trip , cm called Daphne fong and they did claim the rip and are aware pt needs to go for a ct scan and return

## 2023-06-13 NOTE — CASE MANAGEMENT
Case Management Progress Note    Patient name July Webster  Location ED 02/ED 02 MRN [id number]  : 1950 Date 2023       LOS (days): 0  Geometric Mean LOS (GMLOS) (days):   Days to GMLOS:        OBJECTIVE:        Current admission status: Emergency  Preferred Pharmacy:    [address] [address]  Phone: [phone] Fax: [phone]    [facility] [address]  [address]  [facility] [address]  Phone: [phone] Fax: [phone]    Primary Care Provider: Shania Nunes MD    Primary Insurance: [facility]  Secondary Insurance: [facility]    PROGRESS NOTE:  Cm sent for authorization for bls transport via petty- cm was told by cm support that no authorization is needed for bls transport

## 2023-06-13 NOTE — ED NOTES
Patient placed on 2L NC due to oxygen desaturation to 84% while sleeping   Patient oxygen level up to 93% on 2L NC      [address], [facility]  06/13/23 9901

## 2023-08-05 ENCOUNTER — HOSPITAL ENCOUNTER (INPATIENT)
Facility: HOSPITAL | Age: 73
LOS: 5 days | Discharge: HOME HEALTH | DRG: 602 | End: 2023-08-10
Attending: EMERGENCY MEDICINE | Admitting: INTERNAL MEDICINE
Payer: MEDICARE

## 2023-08-05 DIAGNOSIS — L03.90 CELLULITIS: Primary | ICD-10-CM

## 2023-08-05 DIAGNOSIS — I48.20 CHRONIC ATRIAL FIBRILLATION (HCC): Chronic | ICD-10-CM

## 2023-08-05 DIAGNOSIS — D72.829 LEUKOCYTOSIS: ICD-10-CM

## 2023-08-05 DIAGNOSIS — Z74.09 IMPAIRED MOBILITY: ICD-10-CM

## 2023-08-05 DIAGNOSIS — B02.9 HERPES ZOSTER WITHOUT COMPLICATION: ICD-10-CM

## 2023-08-05 DIAGNOSIS — R26.89 FUNCTIONAL GAIT ABNORMALITY: ICD-10-CM

## 2023-08-05 LAB
ANION GAP SERPL CALCULATED.3IONS-SCNC: 7 MMOL/L
BASOPHILS # BLD AUTO: 0.03 THOUSANDS/ÂΜL (ref 0–0.1)
BASOPHILS NFR BLD AUTO: 0 % (ref 0–1)
BUN SERPL-MCNC: 23 MG/DL (ref 5–25)
CALCIUM SERPL-MCNC: 8.5 MG/DL (ref 8.4–10.2)
CHLORIDE SERPL-SCNC: 96 MMOL/L (ref 96–108)
CO2 SERPL-SCNC: 31 MMOL/L (ref 21–32)
CREAT SERPL-MCNC: 1.04 MG/DL (ref 0.6–1.3)
EOSINOPHIL # BLD AUTO: 0.3 THOUSAND/ÂΜL (ref 0–0.61)
EOSINOPHIL NFR BLD AUTO: 2 % (ref 0–6)
ERYTHROCYTE [DISTWIDTH] IN BLOOD BY AUTOMATED COUNT: 17.4 % (ref 11.6–15.1)
GFR SERPL CREATININE-BSD FRML MDRD: 54 ML/MIN/1.73SQ M
GLUCOSE SERPL-MCNC: 127 MG/DL (ref 65–140)
HCT VFR BLD AUTO: 38.5 % (ref 34.8–46.1)
HGB BLD-MCNC: 11.4 G/DL (ref 11.5–15.4)
IMM GRANULOCYTES # BLD AUTO: 0.05 THOUSAND/UL (ref 0–0.2)
IMM GRANULOCYTES NFR BLD AUTO: 0 % (ref 0–2)
INR PPP: 2.48 (ref 0.84–1.19)
LYMPHOCYTES # BLD AUTO: 0.95 THOUSANDS/ÂΜL (ref 0.6–4.47)
LYMPHOCYTES NFR BLD AUTO: 7 % (ref 14–44)
MCH RBC QN AUTO: 24.7 PG (ref 26.8–34.3)
MCHC RBC AUTO-ENTMCNC: 29.6 G/DL (ref 31.4–37.4)
MCV RBC AUTO: 84 FL (ref 82–98)
MONOCYTES # BLD AUTO: 0.61 THOUSAND/ÂΜL (ref 0.17–1.22)
MONOCYTES NFR BLD AUTO: 5 % (ref 4–12)
NEUTROPHILS # BLD AUTO: 11.7 THOUSANDS/ÂΜL (ref 1.85–7.62)
NEUTS SEG NFR BLD AUTO: 86 % (ref 43–75)
NRBC BLD AUTO-RTO: 0 /100 WBCS
PLATELET # BLD AUTO: 450 THOUSANDS/UL (ref 149–390)
PMV BLD AUTO: 8.4 FL (ref 8.9–12.7)
POTASSIUM SERPL-SCNC: 4.2 MMOL/L (ref 3.5–5.3)
PROTHROMBIN TIME: 26.7 SECONDS (ref 11.6–14.5)
RBC # BLD AUTO: 4.61 MILLION/UL (ref 3.81–5.12)
SODIUM SERPL-SCNC: 134 MMOL/L (ref 135–147)
WBC # BLD AUTO: 13.64 THOUSAND/UL (ref 4.31–10.16)

## 2023-08-05 PROCEDURE — 99285 EMERGENCY DEPT VISIT HI MDM: CPT

## 2023-08-05 PROCEDURE — 36415 COLL VENOUS BLD VENIPUNCTURE: CPT

## 2023-08-05 PROCEDURE — 85610 PROTHROMBIN TIME: CPT | Performed by: INTERNAL MEDICINE

## 2023-08-05 PROCEDURE — 99285 EMERGENCY DEPT VISIT HI MDM: CPT | Performed by: EMERGENCY MEDICINE

## 2023-08-05 PROCEDURE — 87186 SC STD MICRODIL/AGAR DIL: CPT | Performed by: INTERNAL MEDICINE

## 2023-08-05 PROCEDURE — 85025 COMPLETE CBC W/AUTO DIFF WBC: CPT

## 2023-08-05 PROCEDURE — 87077 CULTURE AEROBIC IDENTIFY: CPT | Performed by: INTERNAL MEDICINE

## 2023-08-05 PROCEDURE — 87081 CULTURE SCREEN ONLY: CPT | Performed by: INTERNAL MEDICINE

## 2023-08-05 PROCEDURE — 80048 BASIC METABOLIC PNL TOTAL CA: CPT

## 2023-08-05 PROCEDURE — 94664 DEMO&/EVAL PT USE INHALER: CPT

## 2023-08-05 PROCEDURE — 87205 SMEAR GRAM STAIN: CPT | Performed by: INTERNAL MEDICINE

## 2023-08-05 PROCEDURE — 87147 CULTURE TYPE IMMUNOLOGIC: CPT | Performed by: INTERNAL MEDICINE

## 2023-08-05 PROCEDURE — 99223 1ST HOSP IP/OBS HIGH 75: CPT | Performed by: INTERNAL MEDICINE

## 2023-08-05 PROCEDURE — 96374 THER/PROPH/DIAG INJ IV PUSH: CPT

## 2023-08-05 PROCEDURE — 87040 BLOOD CULTURE FOR BACTERIA: CPT | Performed by: INTERNAL MEDICINE

## 2023-08-05 PROCEDURE — 87070 CULTURE OTHR SPECIMN AEROBIC: CPT | Performed by: INTERNAL MEDICINE

## 2023-08-05 RX ORDER — ALLOPURINOL 100 MG/1
100 TABLET ORAL DAILY
Status: DISCONTINUED | OUTPATIENT
Start: 2023-08-05 | End: 2023-08-10 | Stop reason: HOSPADM

## 2023-08-05 RX ORDER — HYDROMORPHONE HCL/PF 1 MG/ML
0.5 SYRINGE (ML) INJECTION ONCE
Status: COMPLETED | OUTPATIENT
Start: 2023-08-05 | End: 2023-08-05

## 2023-08-05 RX ORDER — DULOXETIN HYDROCHLORIDE 30 MG/1
30 CAPSULE, DELAYED RELEASE ORAL DAILY
Status: DISCONTINUED | OUTPATIENT
Start: 2023-08-05 | End: 2023-08-10 | Stop reason: HOSPADM

## 2023-08-05 RX ORDER — GABAPENTIN 100 MG/1
100 CAPSULE ORAL 3 TIMES DAILY
Status: DISCONTINUED | OUTPATIENT
Start: 2023-08-05 | End: 2023-08-10 | Stop reason: HOSPADM

## 2023-08-05 RX ORDER — VANCOMYCIN HYDROCHLORIDE 125 MG/1
125 CAPSULE ORAL EVERY 12 HOURS SCHEDULED
Status: DISCONTINUED | OUTPATIENT
Start: 2023-08-05 | End: 2023-08-07

## 2023-08-05 RX ORDER — ONDANSETRON 2 MG/ML
4 INJECTION INTRAMUSCULAR; INTRAVENOUS EVERY 6 HOURS PRN
Status: DISCONTINUED | OUTPATIENT
Start: 2023-08-05 | End: 2023-08-10 | Stop reason: HOSPADM

## 2023-08-05 RX ORDER — FUROSEMIDE 40 MG/1
40 TABLET ORAL DAILY
Status: DISCONTINUED | OUTPATIENT
Start: 2023-08-05 | End: 2023-08-09

## 2023-08-05 RX ORDER — ACETAMINOPHEN 325 MG/1
650 TABLET ORAL EVERY 6 HOURS PRN
Status: DISCONTINUED | OUTPATIENT
Start: 2023-08-05 | End: 2023-08-10 | Stop reason: HOSPADM

## 2023-08-05 RX ORDER — WARFARIN SODIUM 2.5 MG/1
2.5 TABLET ORAL
Status: COMPLETED | OUTPATIENT
Start: 2023-08-05 | End: 2023-08-05

## 2023-08-05 RX ADMIN — VANCOMYCIN HYDROCHLORIDE 2000 MG: 1 INJECTION, POWDER, LYOPHILIZED, FOR SOLUTION INTRAVENOUS at 16:52

## 2023-08-05 RX ADMIN — WARFARIN SODIUM 2.5 MG: 2.5 TABLET ORAL at 20:37

## 2023-08-05 RX ADMIN — GABAPENTIN 100 MG: 100 CAPSULE ORAL at 17:10

## 2023-08-05 RX ADMIN — HYDROMORPHONE HYDROCHLORIDE 0.5 MG: 1 INJECTION, SOLUTION INTRAMUSCULAR; INTRAVENOUS; SUBCUTANEOUS at 13:28

## 2023-08-05 RX ADMIN — GABAPENTIN 100 MG: 100 CAPSULE ORAL at 20:37

## 2023-08-05 RX ADMIN — VANCOMYCIN HYDROCHLORIDE 125 MG: 125 CAPSULE ORAL at 20:37

## 2023-08-05 NOTE — H&P
[address]  H&P  Name: Evelin Stafford 71 y.o. female I MRN: [id number]  Unit/Bed#: -01 I Date of Admission: 8/5/2023   Date of Service: 8/5/2023 I Hospital Day: 0      Assessment/Plan   * Cellulitis  Assessment & Plan  · Present on admission with erythema/tenderness to right truncal area  · Patient states she has a history of MRSA, will continue vancomycin  · Check wound culture  · Continue local wound care  · Pain control as needed    Shingles  Assessment & Plan  · Patient was on famciclovir prior to admission with no significant improvement  · Complaining of burning/pain of right truncal area  · Will initiate gabapentin  · Hold off on antiviral for now  · Possible superimposed bacterial infection, will continue vancomycin  · Infectious disease consult  · Continue isolation precautions    BMI 70 and over, adult [facility])  Assessment & Plan  Patient with morbid obesity. Follow-up with PCP as outpatient for possible outpatient weight loss program    Chronic atrial fibrillation ([address])  Assessment & Plan  Currently rate controlled. Continue Coumadin. monitor INR    History of Clostridioides difficile infection  Assessment & Plan  Will continue oral vancomycin for prophylaxis given that patient will be on antibiotics. Continue vancomycin 125 mg twice daily    Depression with anxiety  Assessment & Plan  Continue Cymbalta         VTE Prophylaxis: Warfarin (Coumadin)  Code Status: Full code  Discussion with family: Attempted to call patient's primary contact Rachelle at number provided in chart with no answer    Anticipated Length of Stay:  Patient will be admitted on an Inpatient basis with an anticipated length of stay of  > 2 midnights. Justification for Hospital Stay: Cellulitis    Chief Complaint:   Pain from recent shingles infection    History of Present Illness:    Evelin Stafford is a 71 y.o. female who presents with pain with recent shingles infection.   Patient states that she was diagnosed with shingles earlier this week and started on famciclovir. Patient has noticed increased erythema and drainage from wounds on right truncal area. Pain has been intermittent. States that she did feel feverish however did not check her temperature. Also with chills. Denies any nausea or vomiting. Patient tolerating diet. In the ER patient with 10 out of 10 pain. Patient admitted for pain control and evaluation for possible superimposed bacterial infection    Review of Systems:    Review of Systems   Constitutional: Positive for chills, fatigue and fever. Skin: Positive for color change and wound. Neurological: Positive for weakness. All other systems reviewed and are negative. Past Medical and Surgical History:     Past Medical History:   Diagnosis Date   • Atrial fibrillation [facility])    • Bladder stone    • C. difficile colitis    • Cellulitis    • CHF (congestive heart failure) ([facility])    • Depression with anxiety    • Disease of thyroid gland    • Diverticulitis    • Enterocolitis    • History of abnormal cervical Pap smear    • Kidney stone    • Lymphedema    • Menopause     Age 53   • Morbid obesity with BMI of 70 and over, adult ([address])    • MRSA (methicillin resistant Staphylococcus aureus)     abdominal wound   • Osteoarthritis    • Phlebitis     left lower leg   • Spondylolysis        Past Surgical History:   Procedure Laterality Date   • APPENDECTOMY     • CARPAL TUNNEL RELEASE     • CHOLECYSTECTOMY     • CYSTOSCOPY      stent   • FOOT SURGERY  1982    bone spur   • KNEE SURGERY     • WISDOM TOOTH EXTRACTION         Meds/Allergies:    Prior to Admission medications    Medication Sig Start Date End Date Taking?  Authorizing Provider   allopurinol (ZYLOPRIM) 100 mg tablet Take 1 tablet (100 mg total) by mouth daily 9/30/19  Yes Claudette Wesley DO   DULoxetine (CYMBALTA) 20 mg capsule Take 1 capsule (20 mg total) by mouth daily 8/15/18  Yes Claudette Wesley DO   furosemide (LASIX) 40 mg tablet Take 40 mg by mouth daily Hold for SBP < 100   Yes Historical Provider, MD   levothyroxine 125 mcg tablet Take 125 mcg by mouth daily   Yes Historical Provider, MD   methocarbamol (ROBAXIN) 500 mg tablet Take 1 tablet (500 mg total) by mouth 2 (two) times a day 6/12/23  Yes Jaime Gutierres,    mupirocin (BACTROBAN) 2 % ointment Apply topically 3 (three) times a day 3/27/23  Yes Lavonne Monreal PA-C   Neomycin-Bacitracin-Polymyxin (HCA TRIPLE ANTIBIOTIC OINTMENT EX) Apply topically To abdominal fold twice per day. Yes Historical Provider, MD   triamcinolone (KENALOG) 0.1 % cream Apply 0.1 application topically 2 (two) times a day 12/16/21  Yes Historical Provider, MD   warfarin (COUMADIN) 2 mg tablet Take 1 tablet (2 mg total) by mouth every other day Monday, Wednesday, Friday, sunday 2/22/22  Yes ORACIO Irizarry   acetaminophen (TYLENOL) 650 mg CR tablet Take 650 mg by mouth every 8 (eight) hours as needed (for osteoarthritis)    Historical Provider, MD   polyethylene glycol (MIRALAX) 17 g packet Take 17 g by mouth daily  Patient not taking: Reported on 12/14/2022 9/27/22   ORACIO Santos   Probiotic Product (PROBIOTIC DAILY PO) Take 1 each by mouth in the morning  Patient not taking: Reported on 12/14/2022    Historical Provider, MD   senna-docusate sodium (SENOKOT S) 8.6-50 mg per tablet Take 1 tablet by mouth daily at bedtime  Patient not taking: Reported on 12/14/2022 9/27/22   ORACIO Santos   nystatin (MYCOSTATIN) powder Apply topically 2 (two) times a day 11/21/21 5/28/22  Whitney Lazo MD     all medications and allergies reviewed    Allergies:    Allergies   Allergen Reactions   • Augmentin Es-600 [Amoxicillin-Pot Clavulanate] Anaphylaxis and Rash   • Bactrim [Sulfamethoxazole-Trimethoprim] Anaphylaxis   • Cefazolin Itching and Other (See Comments)     Patient developed itching and difficulty swallowing following IV cefazolin administration at [facility] 7/19/20 which required treatment with benadryl and epinephrine - has tolerated other cephalosporins with different side chains including cefepime, cefuroxime, and cephalexin   • Keflex [Cephalexin] Anaphylaxis   • Penicillins Anaphylaxis and Rash     Tolerates cefepime (21)   • Other Rash, Irritability and Edema     Patient reports significant reaction to the use of Max Orb in the abdominal folds leading to swelling, excoriation, maceration and weeping of the skin. • Omeprazole GI Intolerance   • Sulfa Antibiotics Hives   • Latex Rash and Edema   • Vitamin C [Ascorbate - Food Allergy] Rash       Social History:     Marital Status: Single   Occupation: None  Patient Pre-hospital Living Situation: Lives alone, caregivers daily for 12 hours  Patient Pre-hospital Level of Mobility: Nonambulatory due to morbid obesity  Patient Pre-hospital Diet Restrictions: None  Substance Use History:   Social History     Substance and Sexual Activity   Alcohol Use Not Currently     Social History     Tobacco Use   Smoking Status Former   • Packs/day: 5.00   • Years: 3.00   • Total pack years: 15.00   • Types: Cigarettes   • Start date: 1967   • Quit date: 1970   • Years since quittin.1   Smokeless Tobacco Never   Tobacco Comments    5 packs/day until 5 (age [age]) - As per [employer]      Social History     Substance and Sexual Activity   Drug Use Not Currently   • Types: Marijuana    Comment: As a teen - As per [employer]        Family History:  I have reviewed the patient's family history    Physical Exam:     Vitals:   Blood Pressure: 113/66 (23 1300)  Pulse: 65 (23 1300)  Temperature: (!) 97.2 °F (36.2 °C) (23)  Temp Source: Temporal (23)  Respirations: 18 (23 1300)  Height: 5' 3" (160 cm) (23)  Weight - Scale: (!) 199 kg (438 lb) (23)  SpO2: 92 % (23 1300)    Physical Exam  Constitutional:       General: She is not in acute distress.      Appearance: She is obese. HENT:      Head: Normocephalic and atraumatic. Nose: Nose normal.      Mouth/Throat:      Mouth: Mucous membranes are moist.   Eyes:      Extraocular Movements: Extraocular movements intact. Conjunctiva/sclera: Conjunctivae normal.   Cardiovascular:      Rate and Rhythm: Normal rate and regular rhythm. Pulmonary:      Effort: Pulmonary effort is normal. No respiratory distress. Comments: Difficult lung exam due to body habitus  Abdominal:      Palpations: Abdomen is soft. Tenderness: There is no abdominal tenderness. Musculoskeletal:         General: Normal range of motion. Cervical back: Normal range of motion and neck supple. Comments: Generalized weakness   Skin:     General: Skin is warm and dry. Findings: Erythema present. Neurological:      General: No focal deficit present. Mental Status: She is alert. Mental status is at baseline. Cranial Nerves: No cranial nerve deficit. Psychiatric:         Mood and Affect: Mood normal.         Behavior: Behavior normal.         Additional Data:     Lab Results: I have personally reviewed pertinent reports. Results from last 7 days   Lab Units 08/05/23  1328   WBC Thousand/uL 13.64*   HEMOGLOBIN g/dL 11.4*   HEMATOCRIT % 38.5   PLATELETS Thousands/uL 450*   NEUTROS PCT % 86*   LYMPHS PCT % 7*   MONOS PCT % 5   EOS PCT % 2     Results from last 7 days   Lab Units 08/05/23  1328   SODIUM mmol/L 134*   POTASSIUM mmol/L 4.2   CHLORIDE mmol/L 96   CO2 mmol/L 31   BUN mg/dL 23   CREATININE mg/dL 1.04   ANION GAP mmol/L 7   CALCIUM mg/dL 8.5   GLUCOSE RANDOM mg/dL 127                       Imaging: I have personally reviewed pertinent reports. No orders to display     Epic / InVivioLink Everywhere Records Reviewed: Yes    ** Please Note: This note has been constructed using a voice recognition system.  **

## 2023-08-05 NOTE — ASSESSMENT & PLAN NOTE
· Present on admission with erythema/tenderness to right truncal area  · Patient states she has a history of MRSA, will continue vancomycin  · Check wound culture  · Continue local wound care  · Pain control as needed

## 2023-08-05 NOTE — RESPIRATORY THERAPY NOTE
RT Protocol Note  Gadiel Azul 71 y.o. female MRN: [id number]  Unit/Bed#: -01 Encounter: [id number]    Assessment    Principal Problem:    Shingles      Home Pulmonary Medications:  none       Past Medical History:   Diagnosis Date    Atrial fibrillation ([address] [facility])     Bladder stone     C. difficile colitis     Cellulitis     CHF (congestive heart failure) (HCC)     Depression with anxiety     Disease of thyroid gland     Diverticulitis     Enterocolitis     History of abnormal cervical Pap smear     Kidney stone     Lymphedema     Menopause     Age 53    Morbid obesity with BMI of 70 and over, adult ([address] [facility])     MRSA (methicillin resistant Staphylococcus aureus)     abdominal wound    Osteoarthritis     Phlebitis     left lower leg    Spondylolysis      Social History     Socioeconomic History    Marital status: Single     Spouse name: None    Number of children: None    Years of education: None    Highest education level: None   Occupational History    None   Tobacco Use    Smoking status: Former     Packs/day: 5.00     Years: 3.00     Total pack years: 15.00     Types: Cigarettes     Start date: 1967     Quit date: 1970     Years since quittin.1    Smokeless tobacco: Never    Tobacco comments:     5 packs/day until 5 (age [age]) - As per [employer]    Vaping Use    Vaping Use: Never used   Substance and Sexual Activity    Alcohol use: Not Currently    Drug use: Not Currently     Types: Marijuana     Comment: As a teen - As per [employer]     Sexual activity: Not Currently   Other Topics Concern    None   Social History Narrative    Education history: [employer] school    Exercise habits: Walking daily    Nondrinker / no alcohol use - As per Kristina     - As per [employer]      Social Determinants of Health     Financial Resource Strain: Not on file   Food Insecurity: No Food Insecurity (3/23/2023)    Hunger Vital Sign     Worried About Running Out of Food in the Last Year: Never true     [address] in the Last Year: Never true   Transportation Needs: No Transportation Needs (3/23/2023)    PRAPARE - Transportation     Lack of Transportation (Medical): No     Lack of Transportation (Non-Medical): No   Physical Activity: Not on file   Stress: Not on file   Social Connections: Not on file   Intimate Partner Violence: Not on file   Housing Stability: Low Risk  (3/23/2023)    Housing Stability Vital Sign     Unable to Pay for Housing in the Last Year: No     Number of Places Lived in the Last Year: 1     Unstable Housing in the Last Year: No       Subjective         Objective    Physical Exam:   Assessment Type: (P) Assess only  General Appearance: (P) Alert, Awake  Respiratory Pattern: (P) Normal  Chest Assessment: (P) Chest expansion symmetrical  Bilateral Breath Sounds: (P) Clear    Vitals:  Blood pressure 113/66, pulse 65, temperature (!) 97.2 °F (36.2 °C), temperature source Temporal, resp. rate 18, height 5' 3" (1.6 m), weight (!) 199 kg (438 lb), SpO2 92 %. Imaging and other studies: I have personally reviewed pertinent reports. Plan    Respiratory Plan: (P) Discontinue Protocol        Resp Comments: (P) Pt evaluated per respiratory protocal. Pt admitted with shingles. Pt has hx of CHF, quit smoking several years ago. BS are clear and pt denies sob. No bornchodilator therapy needed at this time.

## 2023-08-05 NOTE — ASSESSMENT & PLAN NOTE
· Patient was on famciclovir prior to admission with no significant improvement  · Complaining of burning/pain of right truncal area  · Will initiate gabapentin  · Hold off on antiviral for now  · Possible superimposed bacterial infection, will continue vancomycin  · Infectious disease consult  · Continue isolation precautions

## 2023-08-05 NOTE — ASSESSMENT & PLAN NOTE
Patient with morbid obesity.   Follow-up with PCP as outpatient for possible outpatient weight loss program

## 2023-08-05 NOTE — PROGRESS NOTES
Kay Edmond is a 71 y.o. female who is currently ordered Vancomycin IV with management by the Pharmacy Consult service. Relevant clinical data and objective / subjective history reviewed. Vancomycin Assessment:  Indication and Goal AUC/Trough: Soft tissue (goal -600, trough >10)  Clinical Status: stable  Micro:   Cx pending  Renal Function:  SCr: 1.04 mg/dL  CrCl: 86.9 mL/min  Renal replacement: Not on dialysis  Days of Therapy: 1  Current Dose: new start  Vancomycin Plan:  New Dosin mg IV q24  Estimated AUC: 463 mcg*hr/mL  Estimated Trough: 10.4 mcg/mL  Next Level: 8/8 AM  Renal Function Monitoring: Daily BMP and UOP  Pharmacy will continue to follow closely for s/sx of nephrotoxicity, infusion reactions and appropriateness of therapy. BMP and CBC will be ordered per protocol. We will continue to follow the patient’s culture results and clinical progress daily.     Yamileth Ojeda, Pharmacist

## 2023-08-05 NOTE — ED ATTENDING ATTESTATION
8/5/2023  Katerin Fournier DO, saw and evaluated the patient. I have discussed the patient with the resident/non-physician practitioner and agree with the resident's/non-physician practitioner's findings, Plan of Care, and MDM as documented in the resident's/non-physician practitioner's note, except where noted. All available labs and Radiology studies were reviewed. I was present for key portions of any procedure(s) performed by the resident/non-physician practitioner and I was immediately available to provide assistance. At this point I agree with the current assessment done in the Emergency Department. I have conducted an independent evaluation of this patient a history and physical is as follows:    Patient is a 71-year-old female with history of morbid obesity who presents to the emergency department complaining of right-sided back pain. The patient notes the pain is a 10 out of 10 and complains of increased weeping from a rash that is on the right flank and right back. Patient notes that her doctor recently diagnosed her with shingles earlier in the week and currently is on Famvir. Patient is unable to rule without multiple people assisting. The patient's skin is beefy red and hot to the touch. There is significant weeping from the skin. IV antibiotics will be ordered due to the patient appearing to have cellulitis. There is no definitive abscess formation here. The patient will need IV antibiotics and wound care and repeat evaluation. ED Course      Lab work and IV antibiotics started.       Critical Care Time  Procedures

## 2023-08-05 NOTE — ASSESSMENT & PLAN NOTE
Will continue oral vancomycin for prophylaxis given that patient will be on antibiotics.   Continue vancomycin 125 mg twice daily

## 2023-08-05 NOTE — ED PROVIDER NOTES
History  Chief Complaint   Patient presents with   • Pain     From shingles     HPI     Patient is a 70 y/o F with PMH a fib on warfarin, CHF, panniculitis presenting with severe right back and hip pain. Pt states she was diagnosed with shingles in this area on Tuesday and has been on antiviral since that time. States pain has been 10/10 and feels like burning/stabbing pain. She also notes significant wound drainage that is worsening over the past few days. Denies fever, chills, cp, sob. Prior to Admission Medications   Prescriptions Last Dose Informant Patient Reported? Taking? DULoxetine (CYMBALTA) 20 mg capsule  Self No No   Sig: Take 1 capsule (20 mg total) by mouth daily   Neomycin-Bacitracin-Polymyxin (HCA TRIPLE ANTIBIOTIC OINTMENT EX)  Self Yes No   Sig: Apply topically To abdominal fold twice per day.    Probiotic Product (PROBIOTIC DAILY PO)  Self Yes No   Sig: Take 1 each by mouth in the morning   Patient not taking: Reported on 12/14/2022   acetaminophen (TYLENOL) 650 mg CR tablet  Self Yes No   Sig: Take 650 mg by mouth every 8 (eight) hours as needed (for osteoarthritis)   allopurinol (ZYLOPRIM) 100 mg tablet  Self No No   Sig: Take 1 tablet (100 mg total) by mouth daily   furosemide (LASIX) 40 mg tablet  Self Yes No   Sig: Take 40 mg by mouth daily Hold for SBP < 100   levothyroxine 125 mcg tablet  Self Yes No   Sig: Take 125 mcg by mouth daily   methocarbamol (ROBAXIN) 500 mg tablet   No No   Sig: Take 1 tablet (500 mg total) by mouth 2 (two) times a day   mupirocin (BACTROBAN) 2 % ointment   No No   Sig: Apply topically 3 (three) times a day   polyethylene glycol (MIRALAX) 17 g packet  Self No No   Sig: Take 17 g by mouth daily   Patient not taking: Reported on 12/14/2022   senna-docusate sodium (SENOKOT S) 8.6-50 mg per tablet  Self No No   Sig: Take 1 tablet by mouth daily at bedtime   Patient not taking: Reported on 12/14/2022   triamcinolone (KENALOG) 0.1 % cream  Self Yes No   Sig: Apply 0.1 application topically 2 (two) times a day   warfarin (COUMADIN) 2 mg tablet  Self No No   Sig: Take 1 tablet (2 mg total) by mouth every other day Monday, Wednesday, Friday,       Facility-Administered Medications: None       Past Medical History:   Diagnosis Date   • Atrial fibrillation ([address])    • Bladder stone    • C. difficile colitis    • Cellulitis    • CHF (congestive heart failure) ([address])    • Depression with anxiety    • Disease of thyroid gland    • Diverticulitis    • Enterocolitis    • History of abnormal cervical Pap smear    • Kidney stone    • Lymphedema    • Menopause     Age 53   • Morbid obesity with BMI of 70 and over, adult ([address])    • MRSA (methicillin resistant Staphylococcus aureus)     abdominal wound   • Osteoarthritis    • Phlebitis     left lower leg   • Spondylolysis        Past Surgical History:   Procedure Laterality Date   • APPENDECTOMY     • CARPAL TUNNEL RELEASE     • CHOLECYSTECTOMY     • CYSTOSCOPY      stent   • FOOT SURGERY      bone spur   • KNEE SURGERY     • WISDOM TOOTH EXTRACTION         Family History   Problem Relation Age of Onset   • Heart failure Mother    • Heart disease Mother    • Lymphoma Mother    • Seizures Mother    • Kidney disease Father    • Heart disease Father    • Heart failure Father    • Diabetes type II Father    • Diabetes type II Sister    • Diabetes type II Brother    • Uterine cancer Paternal Aunt    • Breast cancer Neg Hx    • Colon cancer Neg Hx    • Ovarian cancer Neg Hx      I have reviewed and agree with the history as documented.     E-Cigarette/Vaping   • E-Cigarette Use Never User      E-Cigarette/Vaping Substances   • Nicotine No    • THC No    • CBD No    • Flavoring No    • Other No    • Unknown No      Social History     Tobacco Use   • Smoking status: Former     Packs/day: 5.00     Years: 3.00     Total pack years: 15.00     Types: Cigarettes     Start date: 1967     Quit date: 1970     Years since quittin.1   • Smokeless tobacco: Never   • Tobacco comments:     5 packs/day until 5 (age 17-19) - As per [employer]    Vaping Use   • Vaping Use: Never used   Substance Use Topics   • Alcohol use: Not Currently   • Drug use: Not Currently     Types: Marijuana     Comment: As a teen - As per [employer]         Review of Systems   Constitutional: Negative for chills and fever. HENT: Negative for ear pain and sore throat. Eyes: Negative for pain and visual disturbance. Respiratory: Negative for cough and shortness of breath. Cardiovascular: Negative for chest pain and palpitations. Gastrointestinal: Negative for abdominal pain and vomiting. Genitourinary: Negative for dysuria and hematuria. Musculoskeletal: Negative for arthralgias and back pain. Skin: Positive for wound. Negative for color change and rash. Neurological: Negative for seizures and syncope. All other systems reviewed and are negative. Physical Exam  ED Triage Vitals   Temperature Pulse Respirations Blood Pressure SpO2   08/05/23 1217 08/05/23 1217 08/05/23 1217 08/05/23 1300 08/05/23 1217   (!) 97.2 °F (36.2 °C) 81 18 113/66 97 %      Temp Source Heart Rate Source Patient Position - Orthostatic VS BP Location FiO2 (%)   08/05/23 1217 08/05/23 1217 08/05/23 1300 -- --   Temporal Monitor Lying        Pain Score       08/05/23 1217       10 - Worst Possible Pain             Orthostatic Vital Signs  Vitals:    08/05/23 1217 08/05/23 1300   BP:  113/66   Pulse: 81 65   Patient Position - Orthostatic VS:  Lying       Physical Exam  Vitals and nursing note reviewed. Constitutional:       General: She is not in acute distress. Appearance: She is obese. HENT:      Head: Normocephalic and atraumatic. Right Ear: External ear normal.      Left Ear: External ear normal.      Nose: Nose normal.      Mouth/Throat:      Pharynx: Oropharynx is clear. Eyes:      Extraocular Movements: Extraocular movements intact.       Pupils: Pupils are equal, round, and reactive to light. Cardiovascular:      Rate and Rhythm: Normal rate and regular rhythm. Pulses: Normal pulses. Heart sounds: Normal heart sounds. No murmur heard. No friction rub. No gallop. Pulmonary:      Effort: Pulmonary effort is normal. No respiratory distress. Breath sounds: Normal breath sounds. No wheezing, rhonchi or rales. Abdominal:      General: Abdomen is flat. There is no distension. Palpations: Abdomen is soft. Tenderness: There is no abdominal tenderness. There is no guarding or rebound. Musculoskeletal:         General: No deformity. Normal range of motion. Cervical back: Normal range of motion. Right lower leg: No edema. Left lower leg: No edema. Skin:     General: Skin is warm. Capillary Refill: Capillary refill takes less than 2 seconds. Findings: Erythema and rash present. Comments: Seeping wounds from r lateral/posterior hip. Scattered areas of erythema consistent with prior dx of panniculitis. There is an 8x4cm area in the R flank region that is particularly tender and erythematous that appears to be source of current pain. Neurological:      General: No focal deficit present. Mental Status: She is alert and oriented to person, place, and time.       Gait: Gait normal.         ED Medications  Medications   vancomycin (VANCOCIN) 2,000 mg in sodium chloride 0.9 % 500 mL IVPB (has no administration in time range)   HYDROmorphone (DILAUDID) injection 0.5 mg (0.5 mg Intravenous Given 8/5/23 1328)       Diagnostic Studies  Results Reviewed     Procedure Component Value Units Date/Time    Basic metabolic panel [[id number]]  (Abnormal) Collected: 08/05/23 1328    Lab Status: Final result Specimen: Blood from Arm, Right Updated: 08/05/23 1352     Sodium 134 mmol/L      Potassium 4.2 mmol/L      Chloride 96 mmol/L      CO2 31 mmol/L      ANION GAP 7 mmol/L      BUN 23 mg/dL      Creatinine 1.04 mg/dL Glucose 127 mg/dL      Calcium 8.5 mg/dL      eGFR 54 ml/min/1.73sq m     Narrative:      National Kidney Disease Foundation guidelines for Chronic Kidney Disease (CKD):   •  Stage 1 with normal or high GFR (GFR > 90 mL/min/1.73 square meters)  •  Stage 2 Mild CKD (GFR = 60-89 mL/min/1.73 square meters)  •  Stage 3A Moderate CKD (GFR = 45-59 mL/min/1.73 square meters)  •  Stage 3B Moderate CKD (GFR = 30-44 mL/min/1.73 square meters)  •  Stage 4 Severe CKD (GFR = 15-29 mL/min/1.73 square meters)  •  Stage 5 End Stage CKD (GFR <15 mL/min/1.73 square meters)  Note: GFR calculation is accurate only with a steady state creatinine    CBC and differential [[id number]]  (Abnormal) Collected: 08/05/23 1328    Lab Status: Final result Specimen: Blood from Arm, Right Updated: 08/05/23 1340     WBC 13.64 Thousand/uL      RBC 4.61 Million/uL      Hemoglobin 11.4 g/dL      Hematocrit 38.5 %      MCV 84 fL      MCH 24.7 pg      MCHC 29.6 g/dL      RDW 17.4 %      MPV 8.4 fL      Platelets 577 Thousands/uL      nRBC 0 /100 WBCs      Neutrophils Relative 86 %      Immat GRANS % 0 %      Lymphocytes Relative 7 %      Monocytes Relative 5 %      Eosinophils Relative 2 %      Basophils Relative 0 %      Neutrophils Absolute 11.70 Thousands/µL      Immature Grans Absolute 0.05 Thousand/uL      Lymphocytes Absolute 0.95 Thousands/µL      Monocytes Absolute 0.61 Thousand/µL      Eosinophils Absolute 0.30 Thousand/µL      Basophils Absolute 0.03 Thousands/µL     Blood culture #1 [[id number]]     Lab Status: No result Specimen: Blood     Blood culture #2 [[id number]]     Lab Status: No result Specimen: Blood     MRSA culture [[id number]]     Lab Status: No result Specimen: Nose                  No orders to display         Procedures  Procedures      ED Course  ED Course as of 08/05/23 1415   Sat Aug 05, 2023   1413 WBC(!): 13.64                             SBIRT 20yo+    Flowsheet Row Most Recent Value   Initial Alcohol Screen: US AUDIT-C 1. How often do you have a drink containing alcohol? 0 Filed at: 08/05/2023 1330   2. How many drinks containing alcohol do you have on a typical day you are drinking? 0 Filed at: 08/05/2023 1330   3a. Male UNDER 65: How often do you have five or more drinks on one occasion? 0 Filed at: 08/05/2023 1330   3b. FEMALE Any Age, or MALE 65+: How often do you have 4 or more drinks on one occassion? 0 Filed at: 08/05/2023 1330   Audit-C Score 0 Filed at: 08/05/2023 1330   JYOTI: How many times in the past year have you. .. Used an illegal drug or used a prescription medication for non-medical reasons? Never Filed at: 08/05/2023 1330                Medical Decision Making  70 y/o F with severe pain. VSS. Pt does not appear to be in acute distress but describes pain 10/10. There is significant discharge that is soaking chucks/sheets. There are no vesicles on my exam but seeping cellulitis is present. Recommend pt for admission for wound care and antibiotics. Mild leukocytosis on labs. Does not meet SIRS criteria. Amount and/or Complexity of Data Reviewed  Labs: ordered. Risk  Prescription drug management. Decision regarding hospitalization.             Disposition  Final diagnoses:   Cellulitis   BMI 70 and over, adult ([address])   Leukocytosis     Time reflects when diagnosis was documented in both MDM as applicable and the Disposition within this note     Time User Action Codes Description Comment    8/5/2023  2:03 PM Tres Erica Add [L03.90] Cellulitis     8/5/2023  2:14 PM Tres Erica Add [M79.3] Panniculitis     8/5/2023  2:14 PM Tres Erica Remove [M79.3] Panniculitis     8/5/2023  2:14 PM Tres Erica Add [Z68.45] BMI 70 and over, adult ([address])     8/5/2023  2:15 PM Tres Erica Add [W86.911] Leukocytosis       ED Disposition     ED Disposition   Admit    Condition   Stable    Date/Time   Sat Aug 5, 2023  2:03 PM    Comment   Case was discussed with Dr. Viola Spear and the patient's admission status was agreed to be Admission Status: inpatient status to the service of Dr. Samia Ackerman. Follow-up Information    None         Patient's Medications   Discharge Prescriptions    No medications on file     No discharge procedures on file. PDMP Review       Value Time User    PDMP Reviewed  Yes 5/27/2022 11:10 PM Anjelica Rodrigues PA-C           ED Provider  Attending physically available and evaluated Marycarmen Mar. I managed the patient along with the ED Attending.     Electronically Signed by         Jessica Kurtz MD  08/05/23 5512

## 2023-08-06 LAB
ANION GAP SERPL CALCULATED.3IONS-SCNC: 5 MMOL/L
BASOPHILS # BLD AUTO: 0.05 THOUSANDS/ÂΜL (ref 0–0.1)
BASOPHILS NFR BLD AUTO: 0 % (ref 0–1)
BUN SERPL-MCNC: 22 MG/DL (ref 5–25)
CALCIUM SERPL-MCNC: 8.4 MG/DL (ref 8.4–10.2)
CHLORIDE SERPL-SCNC: 99 MMOL/L (ref 96–108)
CO2 SERPL-SCNC: 31 MMOL/L (ref 21–32)
CREAT SERPL-MCNC: 1.04 MG/DL (ref 0.6–1.3)
EOSINOPHIL # BLD AUTO: 0.35 THOUSAND/ÂΜL (ref 0–0.61)
EOSINOPHIL NFR BLD AUTO: 3 % (ref 0–6)
ERYTHROCYTE [DISTWIDTH] IN BLOOD BY AUTOMATED COUNT: 17.2 % (ref 11.6–15.1)
FLUAV RNA RESP QL NAA+PROBE: NEGATIVE
FLUBV RNA RESP QL NAA+PROBE: NEGATIVE
GFR SERPL CREATININE-BSD FRML MDRD: 54 ML/MIN/1.73SQ M
GLUCOSE SERPL-MCNC: 104 MG/DL (ref 65–140)
HCT VFR BLD AUTO: 35.9 % (ref 34.8–46.1)
HGB BLD-MCNC: 10.6 G/DL (ref 11.5–15.4)
IMM GRANULOCYTES # BLD AUTO: 0.05 THOUSAND/UL (ref 0–0.2)
IMM GRANULOCYTES NFR BLD AUTO: 0 % (ref 0–2)
INR PPP: 2.53 (ref 0.84–1.19)
LACTATE SERPL-SCNC: 1.4 MMOL/L (ref 0.5–2)
LYMPHOCYTES # BLD AUTO: 1.19 THOUSANDS/ÂΜL (ref 0.6–4.47)
LYMPHOCYTES NFR BLD AUTO: 10 % (ref 14–44)
MAGNESIUM SERPL-MCNC: 1.9 MG/DL (ref 1.9–2.7)
MCH RBC QN AUTO: 24.5 PG (ref 26.8–34.3)
MCHC RBC AUTO-ENTMCNC: 29.5 G/DL (ref 31.4–37.4)
MCV RBC AUTO: 83 FL (ref 82–98)
MONOCYTES # BLD AUTO: 0.6 THOUSAND/ÂΜL (ref 0.17–1.22)
MONOCYTES NFR BLD AUTO: 5 % (ref 4–12)
NEUTROPHILS # BLD AUTO: 9.15 THOUSANDS/ÂΜL (ref 1.85–7.62)
NEUTS SEG NFR BLD AUTO: 82 % (ref 43–75)
NRBC BLD AUTO-RTO: 0 /100 WBCS
PLATELET # BLD AUTO: 430 THOUSANDS/UL (ref 149–390)
PMV BLD AUTO: 8.4 FL (ref 8.9–12.7)
POTASSIUM SERPL-SCNC: 4.2 MMOL/L (ref 3.5–5.3)
PROCALCITONIN SERPL-MCNC: 0.09 NG/ML
PROTHROMBIN TIME: 27.1 SECONDS (ref 11.6–14.5)
RBC # BLD AUTO: 4.32 MILLION/UL (ref 3.81–5.12)
RSV RNA RESP QL NAA+PROBE: NEGATIVE
SARS-COV-2 RNA RESP QL NAA+PROBE: NEGATIVE
SODIUM SERPL-SCNC: 135 MMOL/L (ref 135–147)
WBC # BLD AUTO: 11.39 THOUSAND/UL (ref 4.31–10.16)

## 2023-08-06 PROCEDURE — 85025 COMPLETE CBC W/AUTO DIFF WBC: CPT | Performed by: INTERNAL MEDICINE

## 2023-08-06 PROCEDURE — 0241U HB NFCT DS VIR RESP RNA 4 TRGT: CPT | Performed by: NURSE PRACTITIONER

## 2023-08-06 PROCEDURE — 83735 ASSAY OF MAGNESIUM: CPT | Performed by: INTERNAL MEDICINE

## 2023-08-06 PROCEDURE — 87040 BLOOD CULTURE FOR BACTERIA: CPT | Performed by: NURSE PRACTITIONER

## 2023-08-06 PROCEDURE — 94760 N-INVAS EAR/PLS OXIMETRY 1: CPT

## 2023-08-06 PROCEDURE — 94664 DEMO&/EVAL PT USE INHALER: CPT

## 2023-08-06 PROCEDURE — 83605 ASSAY OF LACTIC ACID: CPT | Performed by: NURSE PRACTITIONER

## 2023-08-06 PROCEDURE — 84145 PROCALCITONIN (PCT): CPT | Performed by: NURSE PRACTITIONER

## 2023-08-06 PROCEDURE — 99233 SBSQ HOSP IP/OBS HIGH 50: CPT | Performed by: NURSE PRACTITIONER

## 2023-08-06 PROCEDURE — 85610 PROTHROMBIN TIME: CPT | Performed by: INTERNAL MEDICINE

## 2023-08-06 PROCEDURE — 80048 BASIC METABOLIC PNL TOTAL CA: CPT | Performed by: INTERNAL MEDICINE

## 2023-08-06 RX ORDER — OXYCODONE HYDROCHLORIDE 5 MG/1
5 TABLET ORAL ONCE
Status: COMPLETED | OUTPATIENT
Start: 2023-08-06 | End: 2023-08-06

## 2023-08-06 RX ORDER — CEFEPIME HYDROCHLORIDE 2 G/50ML
2000 INJECTION, SOLUTION INTRAVENOUS EVERY 12 HOURS
Status: DISCONTINUED | OUTPATIENT
Start: 2023-08-06 | End: 2023-08-07

## 2023-08-06 RX ADMIN — FUROSEMIDE 40 MG: 40 TABLET ORAL at 09:47

## 2023-08-06 RX ADMIN — GABAPENTIN 100 MG: 100 CAPSULE ORAL at 16:33

## 2023-08-06 RX ADMIN — ACETAMINOPHEN 650 MG: 325 TABLET ORAL at 17:45

## 2023-08-06 RX ADMIN — VANCOMYCIN HYDROCHLORIDE 125 MG: 125 CAPSULE ORAL at 09:47

## 2023-08-06 RX ADMIN — OXYCODONE HYDROCHLORIDE 5 MG: 5 TABLET ORAL at 14:34

## 2023-08-06 RX ADMIN — CEFEPIME HYDROCHLORIDE 2000 MG: 2 INJECTION, SOLUTION INTRAVENOUS at 17:31

## 2023-08-06 RX ADMIN — GABAPENTIN 100 MG: 100 CAPSULE ORAL at 09:47

## 2023-08-06 RX ADMIN — VANCOMYCIN HYDROCHLORIDE 1500 MG: 1 INJECTION, POWDER, LYOPHILIZED, FOR SOLUTION INTRAVENOUS at 14:36

## 2023-08-06 RX ADMIN — GABAPENTIN 100 MG: 100 CAPSULE ORAL at 21:24

## 2023-08-06 RX ADMIN — VANCOMYCIN HYDROCHLORIDE 125 MG: 125 CAPSULE ORAL at 21:24

## 2023-08-06 RX ADMIN — DULOXETINE HYDROCHLORIDE 30 MG: 30 CAPSULE, DELAYED RELEASE ORAL at 09:47

## 2023-08-06 RX ADMIN — LEVOTHYROXINE SODIUM 125 MCG: 25 TABLET ORAL at 05:15

## 2023-08-06 RX ADMIN — ALLOPURINOL 100 MG: 100 TABLET ORAL at 09:47

## 2023-08-06 NOTE — PLAN OF CARE
Problem: Potential for Falls  Goal: Patient will remain free of falls  Description: INTERVENTIONS:  - Educate patient/family on patient safety including physical limitations  - Instruct patient to call for assistance with activity   - Consult OT/PT to assist with strengthening/mobility   - Keep Call bell within reach  - Keep bed low and locked with side rails adjusted as appropriate  - Keep care items and personal belongings within reach  - Initiate and maintain comfort rounds  - Make Fall Risk Sign visible to staff  - Apply yellow socks and bracelet for high fall risk patients  - Consider moving patient to room near nurses station  Outcome: Progressing     Problem: MOBILITY - ADULT  Goal: Maintain or return to baseline ADL function  Description: INTERVENTIONS:  -  Assess patient's ability to carry out ADLs; assess patient's baseline for ADL function and identify physical deficits which impact ability to perform ADLs (bathing, care of mouth/teeth, toileting, grooming, dressing, etc.)  - Assess/evaluate cause of self-care deficits   - Assess range of motion  - Assess patient's mobility; develop plan if impaired  - Assess patient's need for assistive devices and provide as appropriate  - Encourage maximum independence but intervene and supervise when necessary  - Involve family in performance of ADLs  - Assess for home care needs following discharge   - Consider OT consult to assist with ADL evaluation and planning for discharge  - Provide patient education as appropriate  Outcome: Progressing  Goal: Maintains/Returns to pre admission functional level  Description: INTERVENTIONS:  - Perform BMAT or MOVE assessment daily.   - Set and communicate daily mobility goal to care team and patient/family/caregiver. - Collaborate with rehabilitation services on mobility goals if consulted  - Perform Range of Motion 4 times a day. - Reposition patient every 2 hours.   - Out of bed for toileting  - Record patient progress and toleration of activity level   Outcome: Progressing     Problem: DISCHARGE PLANNING  Goal: Discharge to home or other facility with appropriate resources  Description: INTERVENTIONS:  - Identify barriers to discharge w/patient and caregiver  - Arrange for needed discharge resources and transportation as appropriate  - Identify discharge learning needs (meds, wound care, etc.)  - Arrange for interpretive services to assist at discharge as needed  - Refer to Case Management Department for coordinating discharge planning if the patient needs post-hospital services based on physician/advanced practitioner order or complex needs related to functional status, cognitive ability, or social support system  Outcome: Progressing     Problem: Knowledge Deficit  Goal: Patient/family/caregiver demonstrates understanding of disease process, treatment plan, medications, and discharge instructions  Description: Complete learning assessment and assess knowledge base.   Interventions:  - Provide teaching at level of understanding  - Provide teaching via preferred learning methods  Outcome: Progressing     Problem: SKIN/TISSUE INTEGRITY - ADULT  Goal: Skin Integrity remains intact(Skin Breakdown Prevention)  Description: Assess:  -Perform Gucci assessment every shift  -Clean and moisturize skin every day  -Inspect skin when repositioning, toileting, and assisting with ADLS  -Assess under medical devices such as bran every 2 hours  -Assess extremities for adequate circulation and sensation     Bed Management:  -Have minimal linens on bed & keep smooth, unwrinkled  -Change linens as needed when moist or perspiring  -Avoid sitting or lying in one position for more than 2 hours while in bed      Toileting:  -Offer bedside commode  -Assess for incontinence every 2 hours  -Use incontinent care products after each incontinent episode such as remedy hydraguard    Activity:  -Encourage or provide ROM exercises   -Turn and reposition patient every 2 Hours  -Use appropriate equipment to lift or move patient in bed      Skin Care:  -Avoid use of baby powder, tape, friction and shearing, hot water or constrictive clothing      Next Steps:  -Teach patient strategies to minimize risks such as keeping skin clean and dry   -Consider consults to  interdisciplinary teams such as wound care, nutrition  Outcome: Progressing

## 2023-08-06 NOTE — ASSESSMENT & PLAN NOTE
· Patient was on famciclovir prior to admission with no significant improvement  · Complaining of burning/pain of right truncal area  · Continue gabapentin  · Hold off on antiviral for now  · Possible superimposed bacterial infection, will continue vancomycin  · Infectious disease consult  · Continue isolation precautions

## 2023-08-06 NOTE — NURSING NOTE
Pt noted to be shivering and pallor pale. Fingertips and nailbeds cyanotic. Running a low grade temp and tachycardia noted. Made Emy NARAYANAN aware pt is meeting sepsis protocol. Due to fever and tachycardia. Verbalizing sever pain 10/10 upper to lower right back. Shingles rash is open and draining. Remains on contact and airborne precautions.

## 2023-08-06 NOTE — QUICK NOTE
Was called by primary RN for patient with fever and cyanotic fingers. To bedside to examine. Right fingertips are cyanotic, but blanchable with brisk cap refill. Right first toe also cyanotic, blanchable, with brisk cap refill. BP recorded at 117/61. 2+ peripheral pulses. Patient complains of chills, hoarse voice. Denies headache, ear pain, throat pain, nasal congestion, shortness of breath, chest tightness, abdominal pain, diarrhea, dysuria. Denies sore throat or dysphagia. Airway patent. Reports some nausea, not new, and says that she tolerated her lunch of chicken fingers. Bilateral pannus areas with scattered areas of erythema and lesions with surrounding redness on right flank area. Will check COVID flu and RSV, obtain blood cultures and lactic acid. Will start cefepime IV. Although marked as an allergy, patient has tolerated it in two recent admissions as well as other cephalosporins.

## 2023-08-06 NOTE — PROGRESS NOTES
Selena Bailon is a 71 y.o. female who is currently ordered Vancomycin IV with management by the Pharmacy Consult service. Relevant clinical data and objective / subjective history reviewed. Vancomycin Assessment:  Indication and Goal AUC/Trough: Soft tissue (goal -600, trough >10)  Clinical Status: stable  Micro:   Cx pending  Renal Function:  SCr: 1.04 mg/dL  CrCl: 86.9 mL/min  Renal replacement: Not on dialysis  Days of Therapy: 2  Current Dose: 1500 mg IV q24h  Vancomycin Plan:  New Dosing: continue Current dose   Estimated AUC: 461 mcg*hr/mL        3. Estimated Trough: 10.4 mcg/mL        4. Next Level: 8/8 AM         5. Renal Function Monitoring: Daily BMP and [place] will continue to follow closely for s/sx of nephrotoxicity, infusion reactions and appropriateness of therapy. BMP and CBC will be ordered per protocol. We will continue to follow the patient’s culture results and clinical progress daily.     Robert Sawant, Pharmacist

## 2023-08-06 NOTE — PROGRESS NOTES
[address]  Progress Note  Name: Niraj Dee  MRN: [id number]  Unit/Bed#: -01 I Date of Admission: 8/5/2023   Date of Service: 8/6/2023 I Hospital Day: 1    Assessment/Plan      * Cellulitis  Assessment & Plan  · Present on admission with erythema/tenderness to right truncal area  · History of MRSA  · Continue vancomycin  · Check wound culture  · Continue local wound care  · Pain control as needed    Shingles  Assessment & Plan  · Patient was on famciclovir prior to admission with no significant improvement  · Complaining of burning/pain of right truncal area  · Continue gabapentin  · Hold off on antiviral for now  · Possible superimposed bacterial infection, will continue vancomycin  · Infectious disease consult  · Continue isolation precautions    History of Clostridioides difficile infection  Assessment & Plan  · Continue oral vancomycin 125 mg PO BID for prophylaxis      BMI 70 and over, adult ([address])  Assessment & Plan  · BMI 77.59  · Follow-up with PCP for possible outpatient weight loss program    Chronic atrial fibrillation (HCC)  Assessment & Plan  · Currently rate controlled    · Continue Coumadin   · Daily INR    Depression with anxiety  Assessment & Plan  · Continue Cymbalta         VTE Pharmacologic Prophylaxis:   High Risk (Score >/= 5) - Pharmacological DVT Prophylaxis Ordered: warfarin (Coumadin). Sequential Compression Devices Ordered. Patient Centered Rounds: I performed bedside rounds with nursing staff today. Education and Discussions with Family / Patient: Patient declined call to .      Total Time Spent on Date of Encounter in care of patient: 45 minutes This time was spent on one or more of the following: performing physical exam; counseling and coordination of care; obtaining or reviewing history; documenting in the medical record; reviewing/ordering tests, medications or procedures; communicating with other healthcare professionals and discussing with patient's family/caregivers. Current Length of Stay: 1 day(s)  Current Patient Status: Inpatient   Certification Statement: The patient will continue to require additional inpatient hospital stay due to cellulitis. Discharge Plan: TBD. Code Status: Level 1 - Full Code    Subjective:   Patient seen and examined. She is reporting pain from her shingles. Objective:     Vitals:   Temp (24hrs), Av.2 °F (36.8 °C), Min:97.9 °F (36.6 °C), Max:98.7 °F (37.1 °C)    Temp:  [97.9 °F (36.6 °C)-98.7 °F (37.1 °C)] 98.7 °F (37.1 °C)  HR:  [64-75] 75  Resp:  [18] 18  BP: (104-114)/(54-59) 104/59  SpO2:  [92 %-95 %] 93 %  Body mass index is 77.59 kg/m². Input and Output Summary (last 24 hours): Intake/Output Summary (Last 24 hours) at 2023 1428  Last data filed at 2023 1242  Gross per 24 hour   Intake 560 ml   Output 450 ml   Net 110 ml       Physical Exam:     Physical Exam  Vitals and nursing note reviewed. Constitutional:       Appearance: She is obese. HENT:      Head: Normocephalic and atraumatic. Mouth/Throat:      Mouth: Mucous membranes are moist.      Pharynx: Oropharynx is clear. Eyes:      Pupils: Pupils are equal, round, and reactive to light. Pulmonary:      Effort: Pulmonary effort is normal. No respiratory distress. Breath sounds: Normal breath sounds. Abdominal:      General: Bowel sounds are normal.      Palpations: Abdomen is soft. Tenderness: There is no abdominal tenderness. Musculoskeletal:      Cervical back: Neck supple. Right lower leg: No edema. Left lower leg: No edema. Skin:     General: Skin is warm and dry. Capillary Refill: Capillary refill takes less than 2 seconds. Comments: Right flank redness and tenderness   Neurological:      General: No focal deficit present. Mental Status: She is alert.           Additional Data:     Labs:  Results from last 7 days   Lab Units 23  0546   WBC Thousand/uL 11.39*   HEMOGLOBIN g/dL 10.6*   HEMATOCRIT % 35.9   PLATELETS Thousands/uL 430*   NEUTROS PCT % 82*   LYMPHS PCT % 10*   MONOS PCT % 5   EOS PCT % 3     Results from last 7 days   Lab Units 08/06/23  0546   SODIUM mmol/L 135   POTASSIUM mmol/L 4.2   CHLORIDE mmol/L 99   CO2 mmol/L 31   BUN mg/dL 22   CREATININE mg/dL 1.04   ANION GAP mmol/L 5   CALCIUM mg/dL 8.4   GLUCOSE RANDOM mg/dL 104     Results from last 7 days   Lab Units 08/06/23  0546   INR  2.53*                   Lines/Drains:  Invasive Devices     Peripheral Intravenous Line  Duration           Peripheral IV 08/05/23 Right Antecubital 1 day                Recent Cultures (last 7 days):   Results from last 7 days   Lab Units 08/05/23  1732 08/05/23  1721   BLOOD CULTURE   --  Received in Microbiology Lab. Culture in Progress. Received in Microbiology Lab. Culture in Progress. GRAM STAIN RESULT  No Polys or Bacteria seen  --        Last 24 Hours Medication List:   Current Facility-Administered Medications   Medication Dose Route Frequency Provider Last Rate   • acetaminophen  650 mg Oral Q6H PRN Shameka Zazueta MD     • allopurinol  100 mg Oral Daily Shameka Zazueta MD     • DULoxetine  30 mg Oral Daily Shameka Zazueta MD     • furosemide  40 mg Oral Daily Shameka Zazueta MD     • gabapentin  100 mg Oral TID Shameka Zazueta MD     • levothyroxine  125 mcg Oral Early Morning Shameka Zazueta MD     • ondansetron  4 mg Intravenous Q6H PRN Shameka Zazueta MD     • oxyCODONE  5 mg Oral Once ORACIO Combs     • vancomycin  12.5 mg/kg (Adjusted) Intravenous Q24H Shameka Zazueta MD     • vancomycin oral (capsules or solution)  125 mg Oral Q12H Christine Fuentes MD          Today, Patient Was Seen By: ORACIO Combs    **Please Note: This note may have been constructed using a voice recognition system. **

## 2023-08-06 NOTE — ASSESSMENT & PLAN NOTE
· Present on admission with erythema/tenderness to right truncal area  · History of MRSA  · Continue vancomycin  · Check wound culture  · Continue local wound care  · Pain control as needed

## 2023-08-07 LAB
ANION GAP SERPL CALCULATED.3IONS-SCNC: 7 MMOL/L
BASOPHILS # BLD MANUAL: 0 THOUSAND/UL (ref 0–0.1)
BASOPHILS NFR MAR MANUAL: 0 % (ref 0–1)
BUN SERPL-MCNC: 24 MG/DL (ref 5–25)
CALCIUM SERPL-MCNC: 8.7 MG/DL (ref 8.4–10.2)
CHLORIDE SERPL-SCNC: 97 MMOL/L (ref 96–108)
CO2 SERPL-SCNC: 31 MMOL/L (ref 21–32)
CREAT SERPL-MCNC: 1.23 MG/DL (ref 0.6–1.3)
EOSINOPHIL # BLD MANUAL: 0.23 THOUSAND/UL (ref 0–0.4)
EOSINOPHIL NFR BLD MANUAL: 1 % (ref 0–6)
ERYTHROCYTE [DISTWIDTH] IN BLOOD BY AUTOMATED COUNT: 17.2 % (ref 11.6–15.1)
GFR SERPL CREATININE-BSD FRML MDRD: 44 ML/MIN/1.73SQ M
GLUCOSE SERPL-MCNC: 116 MG/DL (ref 65–140)
HCT VFR BLD AUTO: 35.9 % (ref 34.8–46.1)
HGB BLD-MCNC: 10.8 G/DL (ref 11.5–15.4)
LYMPHOCYTES # BLD AUTO: 0.46 THOUSAND/UL (ref 0.6–4.47)
LYMPHOCYTES # BLD AUTO: 2 % (ref 14–44)
MCH RBC QN AUTO: 24.9 PG (ref 26.8–34.3)
MCHC RBC AUTO-ENTMCNC: 30.1 G/DL (ref 31.4–37.4)
MCV RBC AUTO: 83 FL (ref 82–98)
MONOCYTES # BLD AUTO: 0.46 THOUSAND/UL (ref 0–1.22)
MONOCYTES NFR BLD: 2 % (ref 4–12)
NEUTROPHILS # BLD MANUAL: 21.98 THOUSAND/UL (ref 1.85–7.62)
NEUTS BAND NFR BLD MANUAL: 4 % (ref 0–8)
NEUTS SEG NFR BLD AUTO: 91 % (ref 43–75)
PLATELET # BLD AUTO: 400 THOUSANDS/UL (ref 149–390)
PLATELET BLD QL SMEAR: ADEQUATE
PMV BLD AUTO: 8.3 FL (ref 8.9–12.7)
POTASSIUM SERPL-SCNC: 4.4 MMOL/L (ref 3.5–5.3)
PROCALCITONIN SERPL-MCNC: 0.37 NG/ML
RBC # BLD AUTO: 4.34 MILLION/UL (ref 3.81–5.12)
RBC MORPH BLD: NORMAL
SODIUM SERPL-SCNC: 135 MMOL/L (ref 135–147)
WBC # BLD AUTO: 23.14 THOUSAND/UL (ref 4.31–10.16)

## 2023-08-07 PROCEDURE — 97163 PT EVAL HIGH COMPLEX 45 MIN: CPT

## 2023-08-07 PROCEDURE — 84145 PROCALCITONIN (PCT): CPT | Performed by: NURSE PRACTITIONER

## 2023-08-07 PROCEDURE — 99233 SBSQ HOSP IP/OBS HIGH 50: CPT

## 2023-08-07 PROCEDURE — 97167 OT EVAL HIGH COMPLEX 60 MIN: CPT

## 2023-08-07 PROCEDURE — 80048 BASIC METABOLIC PNL TOTAL CA: CPT | Performed by: NURSE PRACTITIONER

## 2023-08-07 PROCEDURE — 99223 1ST HOSP IP/OBS HIGH 75: CPT | Performed by: INTERNAL MEDICINE

## 2023-08-07 PROCEDURE — 85007 BL SMEAR W/DIFF WBC COUNT: CPT | Performed by: NURSE PRACTITIONER

## 2023-08-07 PROCEDURE — 85027 COMPLETE CBC AUTOMATED: CPT | Performed by: NURSE PRACTITIONER

## 2023-08-07 RX ORDER — VANCOMYCIN HYDROCHLORIDE 125 MG/1
125 CAPSULE ORAL EVERY 12 HOURS SCHEDULED
Status: DISCONTINUED | OUTPATIENT
Start: 2023-08-07 | End: 2023-08-08

## 2023-08-07 RX ORDER — VALACYCLOVIR HYDROCHLORIDE 500 MG/1
1000 TABLET, FILM COATED ORAL EVERY 8 HOURS SCHEDULED
Status: DISCONTINUED | OUTPATIENT
Start: 2023-08-07 | End: 2023-08-09

## 2023-08-07 RX ORDER — WARFARIN SODIUM 2 MG/1
2 TABLET ORAL
Status: COMPLETED | OUTPATIENT
Start: 2023-08-07 | End: 2023-08-07

## 2023-08-07 RX ADMIN — VANCOMYCIN HYDROCHLORIDE 125 MG: 125 CAPSULE ORAL at 10:41

## 2023-08-07 RX ADMIN — FUROSEMIDE 40 MG: 40 TABLET ORAL at 10:40

## 2023-08-07 RX ADMIN — ALLOPURINOL 100 MG: 100 TABLET ORAL at 10:41

## 2023-08-07 RX ADMIN — LINEZOLID 300 MG: 600 TABLET, FILM COATED ORAL at 16:34

## 2023-08-07 RX ADMIN — WARFARIN SODIUM 2 MG: 2 TABLET ORAL at 17:47

## 2023-08-07 RX ADMIN — GABAPENTIN 100 MG: 100 CAPSULE ORAL at 22:47

## 2023-08-07 RX ADMIN — LEVOTHYROXINE SODIUM 125 MCG: 25 TABLET ORAL at 05:21

## 2023-08-07 RX ADMIN — GABAPENTIN 100 MG: 100 CAPSULE ORAL at 16:34

## 2023-08-07 RX ADMIN — ACETAMINOPHEN 650 MG: 325 TABLET ORAL at 14:33

## 2023-08-07 RX ADMIN — CEFEPIME HYDROCHLORIDE 2000 MG: 2 INJECTION, SOLUTION INTRAVENOUS at 05:20

## 2023-08-07 RX ADMIN — VALACYCLOVIR HYDROCHLORIDE 1000 MG: 500 TABLET, FILM COATED ORAL at 22:47

## 2023-08-07 RX ADMIN — VANCOMYCIN HYDROCHLORIDE 125 MG: 125 CAPSULE ORAL at 22:47

## 2023-08-07 RX ADMIN — ACETAMINOPHEN 650 MG: 325 TABLET ORAL at 22:49

## 2023-08-07 RX ADMIN — DULOXETINE HYDROCHLORIDE 30 MG: 30 CAPSULE, DELAYED RELEASE ORAL at 10:41

## 2023-08-07 RX ADMIN — GABAPENTIN 100 MG: 100 CAPSULE ORAL at 10:41

## 2023-08-07 RX ADMIN — VALACYCLOVIR HYDROCHLORIDE 1000 MG: 500 TABLET, FILM COATED ORAL at 14:33

## 2023-08-07 NOTE — PLAN OF CARE
Problem: Potential for Falls  Goal: Patient will remain free of falls  Description: INTERVENTIONS:  - Educate patient/family on patient safety including physical limitations  - Instruct patient to call for assistance with activity   - Consult OT/PT to assist with strengthening/mobility   - Keep Call bell within reach  - Keep bed low and locked with side rails adjusted as appropriate  - Keep care items and personal belongings within reach  - Initiate and maintain comfort rounds  - Make Fall Risk Sign visible to staff  - Offer Toileting every 2 Hours, in advance of need  - Initiate/Maintain bed alarm  - Obtain necessary fall risk management equipment: bed alarm   - Apply yellow socks and bracelet for high fall risk patients  - Consider moving patient to room near nurses station  8/7/2023 1052 by Fracisco Valdovinos RN  Outcome: Progressing  8/7/2023 1052 by Fracisco Valdovinos RN  Outcome: Progressing     Problem: MOBILITY - ADULT  Goal: Maintain or return to baseline ADL function  Description: INTERVENTIONS:  -  Assess patient's ability to carry out ADLs; assess patient's baseline for ADL function and identify physical deficits which impact ability to perform ADLs (bathing, care of mouth/teeth, toileting, grooming, dressing, etc.)  - Assess/evaluate cause of self-care deficits   - Assess range of motion  - Assess patient's mobility; develop plan if impaired  - Assess patient's need for assistive devices and provide as appropriate  - Encourage maximum independence but intervene and supervise when necessary  - Involve family in performance of ADLs  - Assess for home care needs following discharge   - Consider OT consult to assist with ADL evaluation and planning for discharge  - Provide patient education as appropriate  8/7/2023 1052 by Fracisco Valdovinos RN  Outcome: Progressing  8/7/2023 1052 by Fracisco Valdovinos RN  Outcome: Progressing  Goal: Maintains/Returns to pre admission functional level  Description: INTERVENTIONS:  - Perform BMAT or MOVE assessment daily.   - Set and communicate daily mobility goal to care team and patient/family/caregiver. - Collaborate with rehabilitation services on mobility goals if consulted  - Perform Range of Motion 3 times a day. - Reposition patient every 2 hours. - Dangle patient 3 times a day    - Record patient progress and toleration of activity level   8/7/2023 1052 by Yasmani Dyer RN  Outcome: Progressing  8/7/2023 1052 by Yasmani Dyer RN  Outcome: Progressing     Problem: DISCHARGE PLANNING  Goal: Discharge to home or other facility with appropriate resources  Description: INTERVENTIONS:  - Identify barriers to discharge w/patient and caregiver  - Arrange for needed discharge resources and transportation as appropriate  - Identify discharge learning needs (meds, wound care, etc.)  - Arrange for interpretive services to assist at discharge as needed  - Refer to Case Management Department for coordinating discharge planning if the patient needs post-hospital services based on physician/advanced practitioner order or complex needs related to functional status, cognitive ability, or social support system  8/7/2023 1052 by Yasmani Dyer RN  Outcome: Progressing  8/7/2023 1052 by Yasmani Dyer RN  Outcome: Progressing     Problem: Knowledge Deficit  Goal: Patient/family/caregiver demonstrates understanding of disease process, treatment plan, medications, and discharge instructions  Description: Complete learning assessment and assess knowledge base.   Interventions:  - Provide teaching at level of understanding  - Provide teaching via preferred learning methods  8/7/2023 1052 by Yasmani Dyer RN  Outcome: Progressing  8/7/2023 1052 by Yasmani Dyer RN  Outcome: Progressing     Problem: SKIN/TISSUE INTEGRITY - ADULT  Goal: Skin Integrity remains intact(Skin Breakdown Prevention)  Description: Assess:  -Perform Gucci assessment every shift  -Clean and moisturize skin every shift  -Inspect skin when repositioning, toileting, and assisting with ADLS  -Assess under medical devices such as O2 tubing every shift  -Assess extremities for adequate circulation and sensation     Bed Management:  -Have minimal linens on bed & keep smooth, unwrinkled  -Change linens as needed when moist or perspiring  -Avoid sitting or lying in one position for more than 2 hours while in bed  -Keep HOB at 30 degrees     Toileting:  -Offer bedside commode  -Assess for incontinence every interaction  -Use incontinent care products after each incontinent episode such as pads    Activity:  -Encourage activity and walks on unit  -Encourage or provide ROM exercises   -Turn and reposition patient every 2 Hours  -Use appropriate equipment to lift or move patient in bed      Skin Care:  -Avoid use of baby powder, tape, friction and shearing, hot water or constrictive clothing  -Relieve pressure over bony prominences using pillows  -Do not massage red bony areas    Outcome: Progressing     Problem: Prexisting or High Potential for Compromised Skin Integrity  Goal: Skin integrity is maintained or improved  Description: INTERVENTIONS:  - Identify patients at risk for skin breakdown  - Assess and monitor skin integrity  - Assess and monitor nutrition and hydration status  - Monitor labs   - Assess for incontinence   - Turn and reposition patient  - Assist with mobility/ambulation  - Relieve pressure over bony prominences  - Avoid friction and shearing  - Provide appropriate hygiene as needed including keeping skin clean and dry  - Evaluate need for skin moisturizer/barrier cream  - Collaborate with interdisciplinary team   - Patient/family teaching  - Consider wound care consult   Outcome: Progressing

## 2023-08-07 NOTE — ASSESSMENT & PLAN NOTE
· Present on admission with erythema/tenderness to right truncal area  · History of MRSA  · ID following, appreciate recommendations  · Patient was receiving IV vancomycin and cefepime, now discontinued by ID  · ID started p.o.  Valtrex and linezolid today  · Wound culture positive Pseudomonas aeruginosa   · Continue local wound care  · Continue pain control as ordered

## 2023-08-07 NOTE — PHYSICAL THERAPY NOTE
Physical Therapy Evaluation   Time in: 0806  Time out: 0821  Total evaluation time: 15 minutes    Patient's Name: Kaity Solano    Admitting Diagnosis  Cellulitis [L03.90]  Leukocytosis [D72.829]  Pain [R52]  BMI 79 and over, adult [facility]) [Z68.45]    Problem List  Patient Active Problem List   Diagnosis    Other specified hypothyroidism    Mild episode of recurrent major depressive disorder ([address])    Idiopathic chronic 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    Primary osteoarthritis involving multiple joints    Chronic diastolic congestive heart failure (HCC)    Panniculitis    Gastroesophageal reflux disease without esophagitis    Lymphedema of extremity    Adjustment disorder    Ambulatory dysfunction    Anemia    Carpal tunnel syndrome    Acute kidney injury superimposed on CKD ([address])    Depression with anxiety    Gout    Hx MRSA infection    Hyperlipidemia    Irritable bowel syndrome    Left atrial enlargement    Left ovarian cyst    Left ventricular hypertrophy    Symptomatic menopausal or female climacteric states    History of Clostridioides difficile infection    Chronic atrial fibrillation ([address])    BMI 70 and over, adult ([address])    Functional gait abnormality    Impaired mobility    Osteoarthritis of glenohumeral joint    Elevated lactic acid level    Shingles    Cellulitis       Past Medical History  Past Medical History:   Diagnosis Date    Atrial fibrillation ([address])     Bladder stone     C. difficile colitis     Cellulitis     CHF (congestive heart failure) ([address])     Depression with anxiety     Disease of thyroid gland     Diverticulitis     Enterocolitis     History of abnormal cervical Pap smear     Kidney stone     Lymphedema     Menopause     Age 53    Morbid obesity with BMI of 70 and over, adult ([address])     MRSA (methicillin resistant Staphylococcus aureus)     abdominal wound    Osteoarthritis     Phlebitis     left lower leg    Spondylolysis        Past Surgical History  Past Surgical History: Procedure Laterality Date    APPENDECTOMY      CARPAL TUNNEL RELEASE      CHOLECYSTECTOMY      CYSTOSCOPY      stent    FOOT SURGERY  1982    bone spur    KNEE SURGERY      WISDOM TOOTH EXTRACTION         PT performed at least 2 patient identifiers during session: Name and wristband. 08/07/23 0821   PT Last Visit   PT Visit Date 08/07/23   Note Type   Note type Evaluation   Pain Assessment   Pain Assessment Tool 0-10   Pain Score   (at rest 5/10, with rolling = 10/10)   Pain Location/Orientation Orientation: Right;Orientation: Lower; Location: Back; Location: Hip   Pain Onset/Description Onset: Ongoing;Frequency: Constant/Continuous   Patient's Stated Pain Goal No pain   Hospital Pain Intervention(s) Repositioned; Emotional support   Restrictions/Precautions   Weight Bearing Precautions Per Order No   Other Precautions Airborne/isolation;Contact/isolation;Droplet precautions; Bed Alarm; Fall Risk;O2;Multiple lines  (2 L O2 via NC)   Home Living   Type of [address] [place] [address] Dr One level;Performs ADLs on one level; Able to live on main level with bedroom/bathroom; Ramped entrance   Bathroom Shower/Tub   (pt sponge bathes/bed bound)   Bathroom Accessibility Not accessible   [facility] bed   Additional Comments pt reports home health PT has been having her dangle at EOB x up to 45 mins   Prior Function   Level of [place] Needs assistance with ADLs; Needs assistance with functional mobility; Needs assistance with IADLS   Lives With [address] From Home health;Personal care attendant  (CGs 8am-8pm 7 days/wk; sister and nephew visit weekly)   IADLs Family/Friend/Other provides meals; Family/Friend/Other provides medication management; Family/Friend/Other provides transportation   Falls in the last 6 months 0   Comments Pt reports at baseline she had assistance from her caregivers who come 12 hours/day.  Caregivers would assist with sponge bathing, dressing, toileting using bed pan   General Family/Caregiver Present No   Cognition   Overall Cognitive Status WFL   Arousal/Participation Alert   Orientation Level Oriented X4   Memory Within functional limits   Following Commands Follows all commands and directions without difficulty   Comments pt agreeable to PT session   Subjective   Subjective "I can't sit up at the EOB right due to the amount of pain I'm in"   RLE Assessment   RLE Assessment X   Strength RLE   R Hip Flexion 2/5   R Knee Extension 2+/5   R Ankle Dorsiflexion 3/5   LLE Assessment   LLE Assessment X   Strength LLE   L Hip Flexion 2+/5   L Knee Extension 2+/5   L Ankle Dorsiflexion 3/5   Coordination   Movements are Fluid and Coordinated 0   Bed Mobility   Rolling R Unable to assess  (pt deferred d/t pain severity)   Rolling L Unable to assess  (pt deferred d/t pain severity)   Supine to Sit 2  Maximal assistance   Additional items Assist x 2;HOB elevated; Increased time required;Verbal cues  (into long sit position of bed - full upright sitting limited d/t body habitus)   Sit to Supine 4  Minimal assistance   Additional items Assist x 2; Increased time required   Additional Comments pt deferred attempts to roll bilateral directions & supine<>Sit d/t pain   Transfers   Sit to Stand   (n/a)   Balance   Static Sitting Poor  (in unsupported long sit position)   Endurance Deficit   Endurance Deficit Yes   Activity Tolerance   Activity Tolerance Patient limited by pain; Patient limited by fatigue   Medical Staff Made Aware coordination of care provided with OT Raffi   Nurse Made Aware Yes, RN made aware of outcomes/recs   Assessment   Prognosis Fair   Problem List Decreased strength;Decreased endurance; Impaired balance;Decreased mobility;Obesity;Pain   Assessment Pt is 71 y.o. female seen for high-complexity PT evaluation on 8/7/2023 s/p admit [address] on 8/5/2023 w/ Cellulitis. PT was consulted to assess pt's functional mobility and d/c needs.  Order placed for PT eval and tx, w/ up and OOB as tolerated order. PTA, pt lives alone in 1 level home c ramped entrance, bedbound @ baseline c working on EOB dangling with HHPT. At time of eval, pt requires max Ax2 for supine>sit, min Ax2 for sit>supine from long sit position in bed; pt defers to roll or perform EOB dangling attempt d/t pain severity. Upon evaluation, pt presenting with impaired functional mobility d/t decreased strength, decreased endurance, impaired balance, decreased mobility, obesity c BMI of 77.59 kg/m2 and pain. Pertinent PMHx and current co-morbidities affecting pt's physical performance at time of assessment include: shingles, h/o CDiff, morbid obesity, AFib, depression c anxiety. Personal factors affecting pt at time of eval include: unable to perform dynamic tasks in community and unable to perform physical activity. The following objective measures performed on IE also reveal limitations: AM-PAC 6-Clicks: 4/99. Pt's clinical presentation is currently unstable/unpredictable seen in pt's presentation of abnormal lab value(s), need for input for task focus and mobility technique and severe pain impacting overall mobility status. Overall, pt's rehab potential and prognosis to return to PLOF is fair as impacted by objective findings, warranting pt to receive further skilled PT interventions to address identified impairments, activity limitation(s), and participation restriction(s). Goal for patient is to have less pain. Pt to benefit from continued PT tx to address deficits as defined above and maximize level of functional independent mobility and consistency in order for pt to improve activity tolerance. From PT/mobility standpoint, recommendation at time of d/c would be home with home health rehabilitation pending progress in order to facilitate return to PLOF.    Barriers to Discharge   (pt bed bound @ baseline, has CG support)   Goals   Patient Goals "to have less pain"   [facility] Expiration Date 08/17/23   Short Term Goal #1 In 7-10 days: Increase bilateral LE strength 1/2 grade to facilitate independent mobility, Perform all bed mobility tasks with mod A of 1 to decrease caregiver burden, Increase static and dynamic sitting balance 1/2 grade to decrease risk for falls, Complete exercise program independently and Tolerate seated at EOB 15 minutes to facilitate functional task performance   PT Treatment Day 0   Plan   Treatment/Interventions LE strengthening/ROM; Therapeutic exercise; Endurance training;Patient/family training;Equipment eval/education; Bed mobility;Spoke to nursing;Spoke to case management   PT Frequency 2-3x/wk   Recommendation   PT Discharge Recommendation Home with home health rehabilitation  (c continued CG support)   AM-PAC Basic Mobility Inpatient   Turning in Flat Bed Without Bedrails 2   Lying on Back to Sitting on Edge of Flat Bed Without Bedrails 1   Moving Bed to Chair 1   Standing Up From Chair Using Arms 1   Walk in Room 1   Climb 3-5 Stairs With Railing 1   Basic Mobility Inpatient Raw Score 7   Turning Head Towards Sound 4   Follow Simple Instructions 4   Low Function Basic Mobility Raw Score  15   Low Function Basic Mobility Standardized Score  23.9   Highest Level Of Mobility   JH-HLM Goal 2: Bed activities/Dependent transfer   -HL Achieved 2: Bed activities/Dependent transfer   End of Consult   Patient Position at End of Consult Supine;Bed/Chair alarm activated; All needs within reach  [facility] elevated minimally per pt request)       Vaughn Blevins, PT, DPT

## 2023-08-07 NOTE — PLAN OF CARE
Problem: OCCUPATIONAL THERAPY ADULT  Goal: Performs self-care activities at highest level of function for planned discharge setting. See evaluation for individualized goals. Description: Treatment Interventions: ADL retraining, UE strengthening/ROM, Endurance training, Patient/family training, Equipment evaluation/education, Compensatory technique education, Energy conservation, Activityengagement    See flowsheet documentation for full assessment, interventions and recommendations. Note: Limitation: Decreased ADL status, Decreased UE ROM, Decreased UE strength, Decreased Safe judgement during ADL, Decreased endurance  Prognosis: Good  Assessment: Pt is a 71 y.o. female seen for OT evaluation s/p admit to [facility] on 8/5/2023 w/ Cellulitis. Comorbidities affecting pt's functional performance at time of assessment include: A-fib, Callulitis, CHF, Lymphedema, OA. Personal factors affecting pt at time of IE include:limited home support and difficulty performing ADLS. Prior to admission, pt required A with ADLs. Upon evaluation: the following deficits impact occupational performance: decreased ROM, decreased strength, decreased balance and decreased tolerance. Pt to benefit from continued skilled OT tx while in the hospital to address deficits as defined above and maximize level of functional independence w ADL's and functional mobility. Occupational Performance areas to address include: bathing/shower, dressing and clothing management. From OT standpoint, recommendation at time of d/c would be Home OT.      OT Discharge Recommendation: Home with home health rehabilitation     [address], [place], OTR/L

## 2023-08-07 NOTE — PROGRESS NOTES
Navdeep Sanders is a 71 y.o. female who is currently ordered Vancomycin IV with management by the Pharmacy Consult service. Relevant clinical data and objective / subjective history reviewed. Vancomycin Assessment:  Indication and Goal AUC/Trough: Soft tissue (goal -600, trough >10), -600, trough >10  Clinical Status: stable  Micro:   pending  Renal Function:  SCr: 1.23 mg/dL  CrCl: 73.5 mL/min  Renal replacement: Not on dialysis  Days of Therapy: 3  Current Dose: 1500 mg q24  Vancomycin Plan:  New Dosin mg q24  Estimated AUC: 449 mcg*hr/mL  Estimated Trough: 11.1 mcg/mL  Next Level:  6am  Renal Function Monitoring: Daily BMP and UOP  Pharmacy will continue to follow closely for s/sx of nephrotoxicity, infusion reactions and appropriateness of therapy. BMP and CBC will be ordered per protocol. We will continue to follow the patient’s culture results and clinical progress daily.     Russell Garcia, Pharmacist

## 2023-08-07 NOTE — ASSESSMENT & PLAN NOTE
· Patient was on famciclovir prior to admission with no significant improvement  · Continues to complain of pain right lower back  · ID following, appreciate recommendations  · Patient was initiated on Valtrex today  · Continue gabapentin  · Continue isolation precautions

## 2023-08-07 NOTE — PLAN OF CARE
Problem: Potential for Falls  Goal: Patient will remain free of falls  Description: INTERVENTIONS:  - Educate patient/family on patient safety including physical limitations  - Instruct patient to call for assistance with activity   - Consult OT/PT to assist with strengthening/mobility   - Keep Call bell within reach  - Keep bed low and locked with side rails adjusted as appropriate  - Keep care items and personal belongings within reach  - Initiate and maintain comfort rounds  - Make Fall Risk Sign visible to staff  - Offer Toileting every 2 Hours, in advance of need  - Initiate/Maintain bed alarm  - Obtain necessary fall risk management equipment: yellow   - Apply yellow socks and bracelet for high fall risk patients  - Consider moving patient to room near nurses station  Outcome: Progressing     Problem: MOBILITY - ADULT  Goal: Maintain or return to baseline ADL function  Description: INTERVENTIONS:  -  Assess patient's ability to carry out ADLs; assess patient's baseline for ADL function and identify physical deficits which impact ability to perform ADLs (bathing, care of mouth/teeth, toileting, grooming, dressing, etc.)  - Assess/evaluate cause of self-care deficits   - Assess range of motion  - Assess patient's mobility; develop plan if impaired  - Assess patient's need for assistive devices and provide as appropriate  - Encourage maximum independence but intervene and supervise when necessary  - Involve family in performance of ADLs  - Assess for home care needs following discharge   - Consider OT consult to assist with ADL evaluation and planning for discharge  - Provide patient education as appropriate  Outcome: Progressing  Goal: Maintains/Returns to pre admission functional level  Description: INTERVENTIONS:  - Perform BMAT or MOVE assessment daily.   - Set and communicate daily mobility goal to care team and patient/family/caregiver.    - Collaborate with rehabilitation services on mobility goals if consulted  - Perform Range of Motion 2 times a day. - Reposition patient every 2 hours. - Dangle patient 3 times a day  - Stand patient 3 times a day  - Ambulate patient 3 times a day  - Out of bed to chair 3 times a day   - Out of bed for meals 3 times a day  - Out of bed for toileting  - Record patient progress and toleration of activity level   Outcome: Progressing     Problem: DISCHARGE PLANNING  Goal: Discharge to home or other facility with appropriate resources  Description: INTERVENTIONS:  - Identify barriers to discharge w/patient and caregiver  - Arrange for needed discharge resources and transportation as appropriate  - Identify discharge learning needs (meds, wound care, etc.)  - Arrange for interpretive services to assist at discharge as needed  - Refer to Case Management Department for coordinating discharge planning if the patient needs post-hospital services based on physician/advanced practitioner order or complex needs related to functional status, cognitive ability, or social support system  Outcome: Progressing     Problem: Knowledge Deficit  Goal: Patient/family/caregiver demonstrates understanding of disease process, treatment plan, medications, and discharge instructions  Description: Complete learning assessment and assess knowledge base.   Interventions:  - Provide teaching at level of understanding  - Provide teaching via preferred learning methods  Outcome: Progressing     Problem: SKIN/TISSUE INTEGRITY - ADULT  Goal: Skin Integrity remains intact(Skin Breakdown Prevention)  Description: Assess:  -Perform Gucci assessment every day  -Clean and moisturize skin every day  -Inspect skin when repositioning, toileting, and assisting with ADLS  -Assess under medical devices such as  every   -Assess extremities for adequate circulation and sensation     Bed Management:  -Have minimal linens on bed & keep smooth, unwrinkled  -Change linens as needed when moist or perspiring  -Avoid sitting or lying in one position for more than 2hours while in bed  -Keep HOB at [address]:  -Offer bedside commode  -Assess for incontinence every   -Use incontinent care products after each incontinent episode such as     Activity:  -Mobilize patient  times a day  -Encourage activity and walks on unit  -Encourage or provide ROM exercises   -Turn and reposition patient every  Hours  -Use appropriate equipment to lift or move patient in bed  -Instruct/ Assist with weight shifting every  when out of bed in chair  -Consider limitation of chair time  hour intervals    Skin Care:  -Avoid use of baby powder, tape, friction and shearing, hot water or constrictive clothing  -Relieve pressure over bony prominences using   -Do not massage red bony areas    Next Steps:  -Teach patient strategies to minimize risks such as    -Consider consults to  interdisciplinary teams such as   Outcome: Progressing     Problem: Prexisting or High Potential for Compromised Skin Integrity  Goal: Skin integrity is maintained or improved  Description: INTERVENTIONS:  - Identify patients at risk for skin breakdown  - Assess and monitor skin integrity  - Assess and monitor nutrition and hydration status  - Monitor labs   - Assess for incontinence   - Turn and reposition patient  - Assist with mobility/ambulation  - Relieve pressure over bony prominences  - Avoid friction and shearing  - Provide appropriate hygiene as needed including keeping skin clean and dry  - Evaluate need for skin moisturizer/barrier cream  - Collaborate with interdisciplinary team   - Patient/family teaching  - Consider wound care consult   Outcome: Progressing

## 2023-08-07 NOTE — CASE MANAGEMENT
Case Management Assessment & Discharge Planning Note    Patient name Kayla Norwood  Location [address] [facility] [place] 216/-17 MRN [id number]  : 1950 Date 2023       Current Admission Date: 2023  Current Admission Diagnosis:Cellulitis   Patient Active Problem List    Diagnosis Date Noted   • Shingles 2023   • Cellulitis 2023   • Elevated lactic acid level 2023   • BMI 70 and over, adult ([address]) 2022   • Chronic atrial fibrillation ([address]) 2022   • History of Clostridioides difficile infection 2022   • Lymphedema of extremity 2021   • Other specified hypothyroidism 10/03/2020   • Mild episode of recurrent major depressive disorder ([address]) 10/03/2020   • Idiopathic chronic 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 10/03/2020   • Primary osteoarthritis involving multiple joints 10/03/2020   • Chronic diastolic congestive heart failure ([address]) 10/03/2020   • Panniculitis 10/03/2020   • Gastroesophageal reflux disease without esophagitis 10/03/2020   • Hx MRSA infection 2020   • Functional gait abnormality 2019   • Impaired mobility 2019   • Osteoarthritis of glenohumeral joint 2019   • Left ovarian cyst 2017   • Depression with anxiety 07/15/2017   • Anemia 2016   • Ambulatory dysfunction 2016   • Acute kidney injury superimposed on CKD ([address]) 2016   • Adjustment disorder 2013   • Irritable bowel syndrome 2012   • Left atrial enlargement 2012   • Left ventricular hypertrophy 2012   • Carpal tunnel syndrome 2012   • Gout 2012   • Hyperlipidemia 2012   • Symptomatic menopausal or female climacteric states 2012      LOS (days): 2  Geometric Mean LOS (GMLOS) (days): 3.20  Days to GMLOS:1.2     OBJECTIVE:    Risk of Unplanned Readmission Score: 20.38         Current admission status: Inpatient       Preferred Pharmacy:   [id number] [place] [id number] [facility] [id number]  Phone: [phone] Fax: [phone]    [facility] [address] [address]  Phone: [phone] Fax: [phone]    Primary Care Provider: Fidencio Weiss MD    Primary Insurance: MEDICARE  Secondary Insurance: AARP    ASSESSMENT:  [place] Proxies    There are no active Health Care Proxies on file.             Readmission Root Cause  30 Day Readmission: No     Patient Information  Admitted from[de-identified] Home  Mental Status: Alert  During Assessment patient was accompanied by: Not accompanied during assessment  Assessment information provided by[de-identified] Patient  Primary Caregiver: Other (Comment)  Caregiver's Name[de-identified] [address] [phone]  Caregiver's Relationship to Patient[de-identified] Other (Specify) (caregiver from [facility])  [facility] [place] Number[de-identified] [phone]  [facility] Residence: [facility] do you live in?: [id number] [facility] entry access options. Select all that apply.: Ramp  Type of Current Residence: [address] home  Upon entering residence, is there a bedroom on the main floor (no further steps)?: Yes (pt stays on the main floor)  Upon entering residence, is there a bathroom on the main floor (no further steps)?: Yes  In the last 12 months, was there a time when you were not able to pay the mortgage or rent on time?: No  In the last 12 months, how many places have you lived?: 1  In the last 12 months, was there a time when you did not have a steady place to sleep or slept in a shelter (including now)?: No  Homeless/housing insecurity resource given?: N/A  Living Arrangements: Lives Alone (caregvier 12hrs /day x 7 days per week)  Is patient a ?: No     Activities of Daily Living Prior to Admission  Functional Status: Total dependent  Completes ADLs independently?: No  Level of ADL dependence: Assistance  Ambulates independently?: No  Level of ambulatory dependence:  Total Dependent  Does patient use assisted devices?: Yes  Assisted Devices (DME) used: Hospital Bed, Wheelchair  Does patient currently own DME?: Yes  What DME does the patient currently own?: Tara Ink Chair/Glide, Bedside Commode, Shower Chair (walk in shower, pulse ox,. bp cuff, bed pan)  Does patient have a history of Outpatient Therapy (PT/OT)?: No  Does the patient have a history of Short-Term Rehab?: Yes ([address] [facility], Juan Gip)  Does patient have a history of HHC?: Yes Joelle Foster)  Does patient currently have [facility]?: Yes     Current Home Health Care  Type of Current Home Care Services: Nurse visit  Current Home Health Agency[de-identified] Paula Provider[de-identified] PCP     Patient Information Continued  Income Source: Pension/halfway  Does patient have prescription coverage?: Yes (mail order & Dalia Raw)  Within the past 12 months, you worried that your food would run out before you got the money to buy more.: Never true  Within the past 12 months, the food you bought just didn't last and you didn't have money to get more.: Never true  Food insecurity resource given?: N/A  Does patient receive dialysis treatments?: No  Does patient have a history of substance abuse?: No  Does patient have a history of Mental Health Diagnosis?: Yes (depression/anxiety)  Is patient receiving treatment for mental health?: Yes (medication from pcp)  Has patient received inpatient treatment related to mental health in the last 2 years?: No        Means of Transportation  Means of Transport to Appts[de-identified] Other (Comment) (medical transport)  In the past 12 months, has lack of transportation kept you from medical appointments or from getting medications?: No  In the past 12 months, has lack of transportation kept you from meetings, work, or from getting things needed for daily living?: No  Was application for public transport provided?: N/A           DISCHARGE DETAILS:     Discharge planning discussed with[de-identified] patient  Freedom of Choice: Yes  Comments - Freedom of Choice: pt is active with [address]- referrals sent via [employer] [facility]         Is the patient interested in [facility] at discharge?: Yes  [facility] requested[de-identified] [facility] Name[de-identified] [address]  Provider[de-identified] PCP  Home Health Services Needed[de-identified] Heart Failure Management, Wound/Ostomy Care (resumption of care)  Homebound Criteria Met[de-identified] Requires Medical Transportation  Supporting Clincal Findings[de-identified] Bed Bound or Wheelchair Bound     DME Referral Provided  Referral made for DME?: No     Other Referral/Resources/Interventions Provided:  Interventions: Other (Specify), [facility]  Referral Comments: pt will return home with [facility] and waiver caregiver to resume. Pr will need S transport home when stable. CM to follow.      Would you like to participate in our [address] service program?  : No - Declined     Treatment Team Recommendation: Home with [address] (home with  caregiver & [facility] to resume.  Sent referral for CHARLEY via [employer])

## 2023-08-07 NOTE — OCCUPATIONAL THERAPY NOTE
Occupational Therapy Evaluation      Jeannie Olsen    8/7/2023    Principal Problem:    Cellulitis  Active Problems:    Depression with anxiety    History of Clostridioides difficile infection    Chronic atrial fibrillation ([address])    BMI 79 and over, adult ([address])    Shingles      Past Medical History:   Diagnosis Date    Atrial fibrillation ([address])     Bladder stone     C. difficile colitis     Cellulitis     CHF (congestive heart failure) ([address])     Depression with anxiety     Disease of thyroid gland     Diverticulitis     Enterocolitis     History of abnormal cervical Pap smear     Kidney stone     Lymphedema     Menopause     Age 53    Morbid obesity with BMI of 70 and over, adult ([address])     MRSA (methicillin resistant Staphylococcus aureus)     abdominal wound    Osteoarthritis     Phlebitis     left lower leg    Spondylolysis        Past Surgical History:   Procedure Laterality Date    APPENDECTOMY      CARPAL TUNNEL RELEASE      CHOLECYSTECTOMY      CYSTOSCOPY      stent    FOOT SURGERY  1982    bone spur    KNEE SURGERY      WISDOM TOOTH EXTRACTION          08/07/23 0820   OT Last Visit   OT Visit Date 08/07/23   Note Type   Note type Evaluation   Pain Assessment   Pain Assessment Tool 0-10   Pain Score   (at rest 5/10, with rolling = 10/10)   Pain Location/Orientation Orientation: Right;Orientation: Lower; Location: Back; Location: Hip   Pain Onset/Description Onset: Ongoing;Frequency: Constant/Continuous   Patient's Stated Pain Goal No pain   Hospital Pain Intervention(s) Repositioned; Emotional support   Restrictions/Precautions   Weight Bearing Precautions Per Order No   Other Precautions Airborne/isolation;Contact/isolation;Droplet precautions; Bed Alarm; Fall Risk;O2;Multiple lines  (2 L O2 via NC)   Home Living   Type of [address] [place] [address] One level;Performs ADLs on one level; Able to live on main level with bedroom/bathroom; Ramped entrance   Bathroom Shower/Tub   (pt sponge bathes/bed bound) Bathroom Accessibility Not accessible   [address] bed   Additional Comments pt reports home health PT has been having her dangle at EOB x up to 45 mins   Prior Function   Level of [place] Needs assistance with ADLs; Needs assistance with functional mobility; Needs assistance with IADLS   Lives With [address] From Home health;Personal care attendant  ([facility] 8am-8pm 7 days/wk; sister and nephew visit weekly)   IADLs Family/Friend/Other provides meals; Family/Friend/Other provides medication management; Family/Friend/Other provides transportation   Falls in the last 6 months 0   Comments Pt reports at baseline she had assistance from her caregivers who come 12 hours/day. Caregivers would assist with sponge bathing, dressing, toileting using bed pan   ADL   UB Bathing Assistance 4  Minimal Assistance   LB Bathing Assistance 2  Maximal Assistance   UB Dressing Assistance 3  Moderate Assistance   LB Dressing Assistance 1  Total Assistance   Bed Mobility   Rolling R Unable to assess  (pt deferred d/t pain severity)   Rolling L Unable to assess  (pt deferred d/t pain severity)   Supine to Sit 2  Maximal assistance   Additional items Assist x 2;HOB elevated; Increased time required;Verbal cues  (into long sit position of bed - full upright sitting limited d/t body habitus)   Sit to Supine 4  Minimal assistance   Additional items Assist x 2; Increased time required   Additional Comments pt deferred attempts to roll bilateral directions & supine<>Sit d/t pain   Transfers   Sit to Stand   (n/a)   Balance   Static Sitting Poor  (in unsupported long sit position)   Activity Tolerance   Activity Tolerance Patient limited by pain; Patient limited by fatigue   Medical Staff Made Aware CM notified   RUE Assessment   RUE Assessment X  (AROM shoulder flextion ~90 degrees, elbow distally WFL)   RUE Strength   RUE Overall Strength Deficits  (3-/5)   LUE Assessment   LUE Assessment X  (AROM shoulder flextion ~90 degrees, elbow distally WFL)   LUE Strength   LUE Overall Strength Deficits  (3-/5)   Cognition   Overall Cognitive Status WFL   Arousal/Participation Alert   Attention Attends with cues to redirect   Orientation Level Oriented X4   Memory Within functional limits   Following Commands Follows all commands and directions without difficulty   Assessment   Limitation Decreased ADL status; Decreased UE ROM; Decreased UE strength;Decreased Safe judgement during ADL;Decreased endurance   Prognosis Good   Assessment Pt is a 71 y.o. female seen for OT evaluation s/p admit to [facility] on 8/5/2023 w/ Cellulitis. Comorbidities affecting pt's functional performance at time of assessment include: A-fib, Callulitis, CHF, Lymphedema, OA. Personal factors affecting pt at time of IE include:limited home support and difficulty performing ADLS. Prior to admission, pt required A with ADLs. Upon evaluation: the following deficits impact occupational performance: decreased ROM, decreased strength, decreased balance and decreased tolerance. Pt to benefit from continued skilled OT tx while in the hospital to address deficits as defined above and maximize level of functional independence w ADL's and functional mobility. Occupational Performance areas to address include: bathing/shower, dressing and clothing management. From OT standpoint, recommendation at time of d/c would be Home OT. Goals   Patient Goals "to have less pain"   Plan   Treatment Interventions ADL retraining;UE strengthening/ROM; Endurance training;Patient/family training;Equipment evaluation/education; Compensatory technique education; Energy conservation; Activityengagement   Goal Expiration Date 08/17/23   OT Treatment Day 0   OT Frequency 3-5x/wk   Recommendation   OT Discharge Recommendation Home with home health rehabilitation   Additional Comments  Pt seen as a co-eval with PT due to the patient's co-morbidities, clinically unstable presentation, and present impairments which are a regression from the patient's baseline. Additional Comments 2 The patient's raw score on the AM-PAC Daily Activity Inpatient Short Form is 12. A raw score of less than 19 suggests the patient may benefit from discharge to post-acute rehabilitation services, however Pt required A with ADLs prior. Please refer to the recommendation of the Occupational Therapist for safe discharge planning.    AM-PAC Daily Activity Inpatient   Lower Body Dressing 1   Bathing 2   Toileting 1   Upper Body Dressing 2   Grooming 3   Eating 3   Daily Activity Raw Score 12   Daily Activity Standardized Score (Calc for Raw Score >=11) 30.6   AM-PAC Applied Cognition Inpatient   Following a Speech/Presentation 4   Understanding Ordinary Conversation 4   Taking Medications 4   Remembering Where Things Are Placed or Put Away 3   Remembering List of 4-5 Errands 3   Taking Care of Complicated Tasks 3   Applied Cognition Raw Score 21   Applied Cognition Standardized Score 44.3     GOALS:    Pt will achieve the following within specified time frame: STG  Pt will achieve the following goals within 5 days    *Self Feeding- (I) for inc'd independence with providing self nourishment    *UB ADL with Min (A) for inc'd independence with self cares    *LB ADL with Max (A) using AE prn for inc'd independence with self cares    *Increase static sitting balance to P+ and dyn sitting balance to P for inc'd safety with sitting purposeful tasks    *Increase sitting tolerance x1 m for inc'd tolerance with sitting purposeful tasks    *Participate in 10m UE therex to increase overall stamina/activity tolerance for purposeful tasks    *Bed mobility- Max (A) for inc'd independence to manage own comfort and initiate EOB & OOB purposeful tasks    Pt will achieve the following within specified time frame: LTG  Pt will achieve the following goals within 10 days    *UB ADL with CGA for inc'd independence with self cares    *LB ADL with Mod (A) using AE prn for inc'd independence with self cares    *Increase static sitting balance to F- and dyn sitting balance to P+ for inc'd safety with sitting purposeful tasks    *Increase sitting tolerance x3 m for inc'd tolerance with sitting purposeful tasks    *Bed mobility- Mod (A) for inc'd independence to manage own comfort and initiate EOB & OOB purposeful tasks      Raffi Burroughs MS, OTR/L

## 2023-08-07 NOTE — ASSESSMENT & PLAN NOTE
· BMI 77.59  · Follow-up with PCP for possible outpatient weight loss program  · Counseled on diet and lifestyle changes, exercise

## 2023-08-07 NOTE — CONSULTS
Consultation - [facility] Infectious Disease   Ino Dubon 71 y.o. female MRN: [id number]  Unit/Bed#: -01 Encounter: [id number]      IMPRESSION & RECOMMENDATIONS:   1. Rash. Patient with erythematous patches of weeping, denuded skin on right shoulder and trunk, extending to the low back. Consider possibility of an atypical varicella zoster infection. Low suspicion for superinfection or bacterial cellulitis. Consider possibility of a volume issue given her chronic dependent, bed bound status. She also reports a new mattress at home, thus consider possibility of a contact dermatitis. She has not improved with IV abx at this interval.   -discontinue IV cefepime and vancomycin  -start PO Valtrex 1g TID, dose confirmed with ID clinical pharmacist   -follow-up blood cultures   -will attempt to locate initial pictures of rash taken by patients VNA   -serial skin exams  -recommend wound care nurse consultation   -pain control per primary team      2. Leukocytosis. WBC uptrending since admission. Initial blood cultures x2 negative. Patient had low grade temp 100.2 yesterday and repeat blood cultures obtained and pending. With worsening leukocytosis while on IV abx, low suspicion that bacterial infectious etiology playing a role. She is otherwise afebrile, systemically well appearing, and hemodynamically stable.   -antibiotics as above  -follow up repeat blood cultures   -monitor CBCd, temperature and hemodynamics     3. Hx of panniculitis, lower abdominal cellulitis. Currently low suspicion for acute infection. Suspect erythema and weeping now is related to volume and less likely cellulitis. Continue local wound care. 4. Hx of C Diff. Continue PO Vanco ppx for 72h post systemic abx. 5. Morbid obesity. BMI 77.59. Dose adjust abx as necessary. 6. Antibiotic Allergies. Hx of anaphylaxis with several abx classes. She was just tolerating IV vanco and cefepime. Monitor for any adverse drug reactions. Antibiotics:  IV vanco D3  PO Vanco D3   IV cefepime D2     I have discussed the above management plan in detail with patient. I have discussed the above management plan in detail with patient's RN, and the primary service, SLIM. ID consult service will continue to follow. HISTORY OF PRESENT ILLNESS:  Reason for Consult: cellulitis     HPI: Selena Bailon is a 71y.o. year old female with obesity, recurrent panniculitis, CHF, gout, hypothyroidism, PAF on coumadin, who presented to [facility] on 8/5 due to right trunk and back pain. Patient reportedly was on famvir in the outpatient setting per PCP since 8/2 for suspected shingles after her caregiver/visiting aide sent pictures to her PCP. Patient states she had sudden onset of a burning right trunk pain with associated excess weeping and blistering of the skin. She was using kenalog cream and then switched to vaseline after the rash worsened. She was brought to ED due to worsening pain. She reports hx of shingles on left trunk in the past, possible 3 blisters that itched that she treated with calamine lotion. Uncertain if this was actually shingles based on her description of sx. She denies current fevers or chills but said yesterday she had episode of chills and SOB, was placed on O2. States her fingers were blue as well and she was started on IV cefepime in addition to IV vanco for suspected sepsis 2/2 right trunk cellulitis. No other flu like symptoms. Patient states she has also had inc weepage from her b/l pannus R>L. She reports taking her lasix on an as needed basis at home if her BP is >766 systolic. She reports getting a new mattress/DME bed at home and that the material irritates her and rips open her skin. She has not had shingle vax but reports having chicken pox when she was kid. She continues to have pain in right trunk with weeping drainage from denuded skin.     REVIEW OF SYSTEMS:  A complete review of systems is negative other than that noted in the HPI.     PAST MEDICAL HISTORY:  Past Medical History:   Diagnosis Date   • Atrial fibrillation ([address])    • Bladder stone    • C. difficile colitis    • Cellulitis    • CHF (congestive heart failure) ([facility])    • Depression with anxiety    • Disease of thyroid gland    • Diverticulitis    • Enterocolitis    • History of abnormal cervical Pap smear    • Kidney stone    • Lymphedema    • Menopause     Age 53   • Morbid obesity with BMI of 70 and over, adult ([address])    • MRSA (methicillin resistant Staphylococcus aureus)     abdominal wound   • Osteoarthritis    • Phlebitis     left lower leg   • Spondylolysis      Past Surgical History:   Procedure Laterality Date   • APPENDECTOMY     • CARPAL TUNNEL RELEASE     • CHOLECYSTECTOMY     • CYSTOSCOPY      stent   • FOOT SURGERY      bone spur   • KNEE SURGERY     • WISDOM TOOTH EXTRACTION         FAMILY HISTORY:  Non-contributory    SOCIAL HISTORY:  Social History   Social History     Substance and Sexual Activity   Alcohol Use Not Currently     Social History     Substance and Sexual Activity   Drug Use Not Currently   • Types: Marijuana    Comment: As a teen - As per [employer]      Social History     Tobacco Use   Smoking Status Former   • Packs/day: 5.00   • Years: 3.00   • Total pack years: 15.00   • Types: Cigarettes   • Start date: 1967   • Quit date: 1970   • Years since quittin.1   Smokeless Tobacco Never   Tobacco Comments    5 packs/day until 5 (age 17-19) - As per [employer]        ALLERGIES:  Allergies   Allergen Reactions   • Augmentin Es-600 [Amoxicillin-Pot Clavulanate] Anaphylaxis and Rash   • Bactrim [Sulfamethoxazole-Trimethoprim] Anaphylaxis   • Cefazolin Itching and Other (See Comments)     Patient developed itching and difficulty swallowing following IV cefazolin administration at [facility] which required treatment with benadryl and epinephrine - has tolerated other cephalosporins with different side chains including cefepime, cefuroxime, and cephalexin   • Keflex [Cephalexin] Anaphylaxis   • Penicillins Anaphylaxis and Rash     Tolerates cefepime (21)   • Other Rash, Irritability and Edema     Patient reports significant reaction to the use of Max Orb in the abdominal folds leading to swelling, excoriation, maceration and weeping of the skin. • Omeprazole GI Intolerance   • Sulfa Antibiotics Hives   • Latex Rash and Edema   • Vitamin C [Ascorbate - Food Allergy] Rash       MEDICATIONS:  All current active medications have been reviewed. PHYSICAL EXAM:  Temp:  [98.7 °F (37.1 °C)-100.2 °F (37.9 °C)] 98.9 °F (37.2 °C)  HR:  [75-91] 84  Resp:  [17-18] 17  BP: (101-117)/(45-61) 104/51  SpO2:  [88 %-98 %] 98 %  Temp (24hrs), Av.4 °F (37.4 °C), Min:98.7 °F (37.1 °C), Max:100.2 °F (37.9 °C)  Current: Temperature: 98.9 °F (37.2 °C)    Intake/Output Summary (Last 24 hours) at 2023 1257  Last data filed at 2023 0900  Gross per 24 hour   Intake 120 ml   Output 550 ml   Net -430 ml       General Appearance:  Appearing chronically ill appearing and debilitated due to body habitus, otherwise nontoxic, and in no distress   Head:  Normocephalic, without obvious abnormality, atraumatic   Eyes:  Conjunctiva pink and sclera anicteric, both eyes   Nose: Nares normal, mucosa normal, no drainage   Throat: Oropharynx moist without lesions   Neck: Supple, symmetrical, no adenopathy, no tenderness/mass/nodules   Back:   Symmetric, no curvature, ROM normal.   Lungs:   Clear to auscultation bilaterally, respirations unlabored   Chest Wall:  No tenderness or deformity. Heart:  RRR; no murmur, rub or gallop   Abdomen:   Soft, non-tender, obese, non-distended, positive bowel sounds. Large pannus R>L. Extremities: No cyanosis, clubbing or edema   Skin: Right shoulder to hip areas of erythematous, patchy weeping denuded skin without evidence of acute cellulitis. No vesicular or crusted lesions.  Few areas of bleeding noted closer to low back/hip due to grisel bed adhesion to the skin. : No CVA or suprapubic tenderness. Neurologic: Alert and oriented times 3, extremity strength 5/5 and symmetric       LABS, IMAGING, & OTHER STUDIES:  Extensive review of the medical records in epic including review of the notes, radiographs, and laboratory results were reviewed personally as below. Lab Results:  I have personally reviewed pertinent labs. Results from last 7 days   Lab Units 08/07/23  0535 08/06/23  0546 08/05/23  1328   WBC Thousand/uL 23.14* 11.39* 13.64*   HEMOGLOBIN g/dL 10.8* 10.6* 11.4*   PLATELETS Thousands/uL 400* 430* 450*     Results from last 7 days   Lab Units 08/07/23  0535 08/06/23  0546 08/05/23  1328   SODIUM mmol/L 135 135 134*   POTASSIUM mmol/L 4.4 4.2 4.2   CHLORIDE mmol/L 97 99 96   CO2 mmol/L 31 31 31   BUN mg/dL 24 22 23   CREATININE mg/dL 1.23 1.04 1.04   EGFR ml/min/1.73sq m 44 54 54   CALCIUM mg/dL 8.7 8.4 8.5     Results from last 7 days   Lab Units 08/06/23  1832 08/06/23  1821 08/05/23  1732 08/05/23  1721   BLOOD CULTURE  Received in Microbiology Lab. Culture in Progress. Received in Microbiology Lab. Culture in Progress. --  No Growth at 24 hrs. No Growth at 24 hrs. GRAM STAIN RESULT   --   --  No Polys or Bacteria seen  --    WOUND CULTURE   --   --  Culture results to follow. --    MRSA CULTURE ONLY   --   --   --  Culture results to follow. Results from last 7 days   Lab Units 08/07/23  0535 08/06/23  1820   PROCALCITONIN ng/ml 0.37* 0.09                   Imaging Studies:   I have personally reviewed pertinent imaging study reports and images in PACS. Other Studies:   I have personally reviewed pertinent report including blood cultures, wound cx, clinical media.      I have spent a total time of 80 minutes on 08/07/23 in caring for this patient including Diagnostic results, Risks and benefits of tx options, Impressions, Counseling / Coordination of care, Documenting in the medical record, Reviewing / ordering tests, medicine, procedures  , Obtaining or reviewing history   and Communicating with other healthcare professionals .

## 2023-08-07 NOTE — PLAN OF CARE
Problem: PHYSICAL THERAPY ADULT  Goal: Performs mobility at highest level of function for planned discharge setting. See evaluation for individualized goals. Description: Treatment/Interventions: LE strengthening/ROM, Therapeutic exercise, Endurance training, Patient/family training, Equipment eval/education, Bed mobility, Spoke to nursing, Spoke to case management          See flowsheet documentation for full assessment, interventions and recommendations. Note: Prognosis: Fair  Problem List: Decreased strength, Decreased endurance, Impaired balance, Decreased mobility, Obesity, Pain  Assessment: Pt is 71 y.o. female seen for high-complexity PT evaluation on 8/7/2023 s/p admit to [address] [place] [facility] on 8/5/2023 w/ Cellulitis. PT was consulted to assess pt's functional mobility and d/c needs. Order placed for PT eval and tx, w/ up and OOB as tolerated order. PTA, pt lives alone in 1 level home c ramped entrance, bedbound @ baseline c working on EOB dangling with HHPT. At time of eval, pt requires max Ax2 for supine>sit, min Ax2 for sit>supine from long sit position in bed; pt defers to roll or perform EOB dangling attempt d/t pain severity. Upon evaluation, pt presenting with impaired functional mobility d/t decreased strength, decreased endurance, impaired balance, decreased mobility, obesity c BMI of 77.59 kg/m2 and pain. Pertinent PMHx and current co-morbidities affecting pt's physical performance at time of assessment include: shingles, h/o CDiff, morbid obesity, AFib, depression c anxiety. Personal factors affecting pt at time of eval include: unable to perform dynamic tasks in community and unable to perform physical activity. The following objective measures performed on IE also reveal limitations: AM-PAC 6-Clicks: 4/55.  Pt's clinical presentation is currently unstable/unpredictable seen in pt's presentation of abnormal lab value(s), need for input for task focus and mobility technique and severe pain impacting overall mobility status. Overall, pt's rehab potential and prognosis to return to PLOF is fair as impacted by objective findings, warranting pt to receive further skilled PT interventions to address identified impairments, activity limitation(s), and participation restriction(s). Goal for patient is to have less pain. Pt to benefit from continued PT tx to address deficits as defined above and maximize level of functional independent mobility and consistency in order for pt to improve activity tolerance. From PT/mobility standpoint, recommendation at time of d/c would be home with home health rehabilitation pending progress in order to facilitate return to PLOF. Barriers to Discharge:  (pt bed bound @ baseline, has CG support)     PT Discharge Recommendation: Home with home health rehabilitation (c continued CG support)    See flowsheet documentation for full assessment.

## 2023-08-07 NOTE — PROGRESS NOTES
[address]  Progress Note  Name: Nereyda Drummond  MRN: [id number]  Unit/Bed#: -01 I Date of Admission: 8/5/2023   Date of Service: 8/7/2023 I Hospital Day: 2    Assessment/Plan   * Cellulitis  Assessment & Plan  · Present on admission with erythema/tenderness to right truncal area  · History of MRSA  · ID following, appreciate recommendations  · Patient was receiving IV vancomycin and cefepime, now discontinued by ID  · ID started p.o. Valtrex and linezolid today  · Wound culture positive Pseudomonas aeruginosa   · Continue local wound care  · Continue pain control as ordered    Shingles  Assessment & Plan  · Patient was on famciclovir prior to admission with no significant improvement  · Continues to complain of pain right lower back  · ID following, appreciate recommendations  · Patient was initiated on Valtrex today  · Continue gabapentin  · Continue isolation precautions    History of Clostridioides difficile infection  Assessment & Plan  · Continue oral vancomycin 125 mg PO BID for prophylaxis      Depression with anxiety  Assessment & Plan  · Currently without signs of depression, pleasant and talkative   · Continue Cymbalta    Chronic atrial fibrillation (HCC)  Assessment & Plan  · Currently rate controlled    · Continue Coumadin   · INR in a.m.    BMI 70 and over, adult ([address])  Assessment & Plan  · BMI 77.59  · Follow-up with PCP for possible outpatient weight loss program  · Counseled on diet and lifestyle changes, exercise             VTE Pharmacologic Prophylaxis:   Pharmacologic: Warfarin (Coumadin)  Mechanical VTE Prophylaxis in Place: Yes    Patient Centered Rounds: I have performed bedside rounds with nursing staff today.     Discussions with Specialists or Other Care Team Provider: ID, nursing, case management    Education and Discussions with Family / Patient: Treatment plan discussed with patient who understands the plan as its been explained and is agreeable to the plan as stated. All questions answered to satisfaction. Time Spent for Care: 45 minutes. More than 50% of total time spent on counseling and coordination of care as described above. Current Length of Stay: 2 day(s)    Current Patient Status: Inpatient   Certification Statement: The patient will continue to require additional inpatient hospital stay due to IV antibiotics, trending fever, trending procalcitonin, repeat labs in a.m., pending blood cultures (ID following)    Discharge Plan: Plan is for patient to return home with a when medically stable for discharge    Code Status: Level 1 - Full Code      Subjective:   Pleasant and talkative, offers no complaints of discomfort at this time. Readily verbalizing needs. Objective:     Vitals:   Temp (24hrs), Av.8 °F (37.7 °C), Min:98.9 °F (37.2 °C), Max:100.7 °F (38.2 °C)    Temp:  [98.9 °F (37.2 °C)-100.7 °F (38.2 °C)] 100.7 °F (38.2 °C)  HR:  [79-85] 79  Resp:  [17-18] 17  BP: ()/(45-57) 97/57  SpO2:  [88 %-98 %] 95 %  Body mass index is 77.59 kg/m². Input and Output Summary (last 24 hours): Intake/Output Summary (Last 24 hours) at 2023 1704  Last data filed at 2023 1300  Gross per 24 hour   Intake 340 ml   Output 550 ml   Net -210 ml       Physical Exam:     Physical Exam  Vitals and nursing note reviewed. Constitutional:       General: She is not in acute distress. Appearance: She is obese. She is not ill-appearing. HENT:      Head: Normocephalic and atraumatic. Nose: Nose normal.      Mouth/Throat:      Mouth: Mucous membranes are moist.   Cardiovascular:      Rate and Rhythm: Normal rate and regular rhythm. Pulses: Normal pulses. Heart sounds: Normal heart sounds. Pulmonary:      Effort: Pulmonary effort is normal. No respiratory distress. Breath sounds: No wheezing or rales.       Comments: Nonlabored respirations on O2 2 L nasal cannula; decreased breath sounds bilateral bases, no cough or shortness of breath  Abdominal:      General: Bowel sounds are normal. There is no distension. Palpations: Abdomen is soft. Tenderness: There is no abdominal tenderness. There is no guarding. Musculoskeletal:         General: Normal range of motion. Skin:     General: Skin is warm and dry. Capillary Refill: Capillary refill takes less than 2 seconds. Findings: Erythema present. Comments: Erythema noted on back; no open areas and the abdominal fold, mild erythema noted   Neurological:      General: No focal deficit present. Mental Status: She is alert and oriented to person, place, and time. Mental status is at baseline. Psychiatric:         Mood and Affect: Mood normal.         Behavior: Behavior normal.         Thought Content: Thought content normal.         Judgment: Judgment normal.         Additional Data:     Labs:    Results from last 7 days   Lab Units 08/07/23  0535 08/06/23  0546   WBC Thousand/uL 23.14* 11.39*   HEMOGLOBIN g/dL 10.8* 10.6*   HEMATOCRIT % 35.9 35.9   PLATELETS Thousands/uL 400* 430*   NEUTROS PCT %  --  82*   LYMPHS PCT %  --  10*   LYMPHO PCT % 2*  --    MONOS PCT %  --  5   MONO PCT % 2*  --    EOS PCT % 1 3     Results from last 7 days   Lab Units 08/07/23  0535   POTASSIUM mmol/L 4.4   CHLORIDE mmol/L 97   CO2 mmol/L 31   BUN mg/dL 24   CREATININE mg/dL 1.23   CALCIUM mg/dL 8.7     Results from last 7 days   Lab Units 08/06/23  0546   INR  2.53*       * I Have Reviewed All Lab Data Listed Above. * Additional Pertinent Lab Tests Reviewed: [facility] Admission Reviewed    Imaging:    Imaging Reports Reviewed Today Include:   Imaging Personally Reviewed by Myself Includes:      Recent Cultures (last 7 days):     Results from last 7 days   Lab Units 08/06/23  1832 08/06/23  1821 08/05/23  1732 08/05/23  1721   BLOOD CULTURE  Received in Microbiology Lab. Culture in Progress. Received in Microbiology Lab. Culture in Progress.   --  No Growth at 24 hrs. No Growth at 24 hrs. GRAM STAIN RESULT   --   --  No Polys or Bacteria seen  --    WOUND CULTURE   --   --  Few Colonies of Pseudomonas aeruginosa*  --        Last 24 Hours Medication List:   Current Facility-Administered Medications   Medication Dose Route Frequency Provider Last Rate   • acetaminophen  650 mg Oral Q6H PRN Cindy Garland MD     • allopurinol  100 mg Oral Daily Cindy Garland MD     • DULoxetine  30 mg Oral Daily Cindy Garland MD     • furosemide  40 mg Oral Daily Cindy Garland MD     • gabapentin  100 mg Oral TID Cindy Garland MD     • levothyroxine  125 mcg Oral Early Morning Cindy Garland MD     • linezolid  300 mg Oral Q12H [address] Janet Daily MD     • ondansetron  4 mg Intravenous Q6H PRN Cindy Garland MD     • valACYclovir  1,000 mg Oral Q8H [address]  Wilian Zapata PA-C     • vancomycin oral (capsules or solution)  125 mg Oral Q12H [address] Wilian Zapata PA-C     • warfarin  2 mg Oral Once (warfarin) ORACIO Last          Today, Patient Was Seen By: ORACIO Ramírez    ** Please Note: Dictation voice to text software may have been used in the creation of this document.  **

## 2023-08-08 LAB
ANION GAP SERPL CALCULATED.3IONS-SCNC: 6 MMOL/L
BASOPHILS # BLD AUTO: 0.02 THOUSANDS/ÂΜL (ref 0–0.1)
BASOPHILS NFR BLD AUTO: 0 % (ref 0–1)
BUN SERPL-MCNC: 25 MG/DL (ref 5–25)
CALCIUM SERPL-MCNC: 8.3 MG/DL (ref 8.4–10.2)
CHLORIDE SERPL-SCNC: 97 MMOL/L (ref 96–108)
CO2 SERPL-SCNC: 29 MMOL/L (ref 21–32)
CREAT SERPL-MCNC: 1.32 MG/DL (ref 0.6–1.3)
EOSINOPHIL # BLD AUTO: 0.23 THOUSAND/ÂΜL (ref 0–0.61)
EOSINOPHIL NFR BLD AUTO: 2 % (ref 0–6)
ERYTHROCYTE [DISTWIDTH] IN BLOOD BY AUTOMATED COUNT: 17 % (ref 11.6–15.1)
GFR SERPL CREATININE-BSD FRML MDRD: 40 ML/MIN/1.73SQ M
GLUCOSE SERPL-MCNC: 103 MG/DL (ref 65–140)
HCT VFR BLD AUTO: 37 % (ref 34.8–46.1)
HGB BLD-MCNC: 10.6 G/DL (ref 11.5–15.4)
IMM GRANULOCYTES # BLD AUTO: 0.17 THOUSAND/UL (ref 0–0.2)
IMM GRANULOCYTES NFR BLD AUTO: 1 % (ref 0–2)
INR PPP: 2.16 (ref 0.84–1.19)
LYMPHOCYTES # BLD AUTO: 0.72 THOUSANDS/ÂΜL (ref 0.6–4.47)
LYMPHOCYTES NFR BLD AUTO: 5 % (ref 14–44)
MCH RBC QN AUTO: 24.6 PG (ref 26.8–34.3)
MCHC RBC AUTO-ENTMCNC: 28.6 G/DL (ref 31.4–37.4)
MCV RBC AUTO: 86 FL (ref 82–98)
MONOCYTES # BLD AUTO: 0.81 THOUSAND/ÂΜL (ref 0.17–1.22)
MONOCYTES NFR BLD AUTO: 5 % (ref 4–12)
NEUTROPHILS # BLD AUTO: 13.31 THOUSANDS/ÂΜL (ref 1.85–7.62)
NEUTS SEG NFR BLD AUTO: 87 % (ref 43–75)
NRBC BLD AUTO-RTO: 0 /100 WBCS
PLATELET # BLD AUTO: 399 THOUSANDS/UL (ref 149–390)
PMV BLD AUTO: 8.5 FL (ref 8.9–12.7)
POTASSIUM SERPL-SCNC: 4.2 MMOL/L (ref 3.5–5.3)
PROCALCITONIN SERPL-MCNC: 0.4 NG/ML
PROTHROMBIN TIME: 24 SECONDS (ref 11.6–14.5)
RBC # BLD AUTO: 4.31 MILLION/UL (ref 3.81–5.12)
SODIUM SERPL-SCNC: 132 MMOL/L (ref 135–147)
WBC # BLD AUTO: 15.26 THOUSAND/UL (ref 4.31–10.16)

## 2023-08-08 PROCEDURE — 99233 SBSQ HOSP IP/OBS HIGH 50: CPT

## 2023-08-08 PROCEDURE — 85610 PROTHROMBIN TIME: CPT | Performed by: INTERNAL MEDICINE

## 2023-08-08 PROCEDURE — 84145 PROCALCITONIN (PCT): CPT

## 2023-08-08 PROCEDURE — 99233 SBSQ HOSP IP/OBS HIGH 50: CPT | Performed by: INTERNAL MEDICINE

## 2023-08-08 PROCEDURE — 80048 BASIC METABOLIC PNL TOTAL CA: CPT | Performed by: INTERNAL MEDICINE

## 2023-08-08 PROCEDURE — 85025 COMPLETE CBC W/AUTO DIFF WBC: CPT | Performed by: INTERNAL MEDICINE

## 2023-08-08 RX ORDER — VANCOMYCIN HYDROCHLORIDE 125 MG/1
125 CAPSULE ORAL EVERY 12 HOURS SCHEDULED
Status: DISCONTINUED | OUTPATIENT
Start: 2023-08-08 | End: 2023-08-09

## 2023-08-08 RX ORDER — OXYCODONE HYDROCHLORIDE 5 MG/1
5 TABLET ORAL EVERY 4 HOURS PRN
Status: DISCONTINUED | OUTPATIENT
Start: 2023-08-08 | End: 2023-08-10 | Stop reason: HOSPADM

## 2023-08-08 RX ORDER — WARFARIN SODIUM 3 MG/1
3 TABLET ORAL
Status: COMPLETED | OUTPATIENT
Start: 2023-08-08 | End: 2023-08-08

## 2023-08-08 RX ORDER — OXYCODONE HYDROCHLORIDE 10 MG/1
10 TABLET ORAL EVERY 4 HOURS PRN
Status: DISCONTINUED | OUTPATIENT
Start: 2023-08-08 | End: 2023-08-10 | Stop reason: HOSPADM

## 2023-08-08 RX ADMIN — ALLOPURINOL 100 MG: 100 TABLET ORAL at 09:45

## 2023-08-08 RX ADMIN — GABAPENTIN 100 MG: 100 CAPSULE ORAL at 15:06

## 2023-08-08 RX ADMIN — LEVOTHYROXINE SODIUM 125 MCG: 25 TABLET ORAL at 05:02

## 2023-08-08 RX ADMIN — VANCOMYCIN HYDROCHLORIDE 125 MG: 125 CAPSULE ORAL at 22:12

## 2023-08-08 RX ADMIN — VALACYCLOVIR HYDROCHLORIDE 1000 MG: 500 TABLET, FILM COATED ORAL at 05:02

## 2023-08-08 RX ADMIN — LINEZOLID 300 MG: 600 TABLET, FILM COATED ORAL at 22:15

## 2023-08-08 RX ADMIN — DULOXETINE HYDROCHLORIDE 30 MG: 30 CAPSULE, DELAYED RELEASE ORAL at 09:45

## 2023-08-08 RX ADMIN — VALACYCLOVIR HYDROCHLORIDE 1000 MG: 500 TABLET, FILM COATED ORAL at 14:50

## 2023-08-08 RX ADMIN — WARFARIN SODIUM 3 MG: 3 TABLET ORAL at 18:11

## 2023-08-08 RX ADMIN — GABAPENTIN 100 MG: 100 CAPSULE ORAL at 22:12

## 2023-08-08 RX ADMIN — OXYCODONE HYDROCHLORIDE 5 MG: 5 TABLET ORAL at 18:23

## 2023-08-08 RX ADMIN — VALACYCLOVIR HYDROCHLORIDE 1000 MG: 500 TABLET, FILM COATED ORAL at 22:12

## 2023-08-08 RX ADMIN — LINEZOLID 300 MG: 600 TABLET, FILM COATED ORAL at 09:46

## 2023-08-08 RX ADMIN — GABAPENTIN 100 MG: 100 CAPSULE ORAL at 09:45

## 2023-08-08 RX ADMIN — VANCOMYCIN HYDROCHLORIDE 125 MG: 125 CAPSULE ORAL at 09:45

## 2023-08-08 NOTE — WOUND OSTOMY CARE
Consult Note - Wound   Fadia Gave 71 y.o. female MRN: [id number]  Unit/Bed#: -01 Encounter: [id number]    History and Present Illness:  71year old female patient admitted with cellulitis. Wound care consulted for skin breakdown. She has a history of morbid obesity with a BMI of 77.59, chronic lymphedema. recurrent cellulitis, C-diff colitis, AFIB, CHF and hypothyroidism, this admission patient is being treated for shingles. Wound care nurse has seen this patient on prior admissions for skin breakdown. Bariatric bed has been ordered for patient, encouraged patient to be placed on bed for ease of care. Assessment Findings:   She is awake, alert and oriented. She is in bed for assessment. She is able to be rolled onto her side with maximum assistance. She is able to feed herself, needs assistance with hygiene care, has a purewick in place. She is pleasant and cooperative. 1. Bilateral heels intact  2. Area of skin breakdown to the right outer buttock. Small amount of bloody drainage. Per Rn, this is area of shingles. Her lower and upper back with areas of erythema and dry scaly skin, no drainage. Non-adherent dressing placed on right outer buttock and ABD pads taped together as one large pad placed beneath patient, she complained of incontinence pad sticking to her   3. Skin breakdown due to MASD/ITD to the abdominal and right breast fold. Beefy red with scant serosanguineous drainage  4. Left breast fold wounds oval in shape, beefy red with scant serosanguineous drainage, unknown etiology    Patient requests that no Maxorb, Interdry or Allevyn foam dressings, ABD pads placed in areas of breakdown per patient request. Requsted Kenalog cream as patient states that is the treatment she uses at home for skin fold breakdown, requested from provider. Skin and Wound Care Plan:   1. Apply skin nourishing cream daily  2. Elevate heels off of bed with pillows to offload  3.  Turn and reposition patient Q2 hours  4. Cleanse back and right lateral buttocks with Remedy foaming cleanser daily, pat dry. Place non-adherent oil emulsion dressing on open areas and place ABD pads that have been taped together to make a large dressing on the lower back, change daily and PRN  5. Cleanse abdominal, breast and upper thigh folds with Remedy foaming cleanser, pat dry. Place ABD pads in the folds at the open wound beds, change daily and PRN. May apply Kenalog cream to the open areas when ordered by provider  6. Bariatric bed    Wounds:  Wound 03/22/23 MASD Breast Left;Upper (Active)   Wound Image   08/07/23 1734   Wound Description Beefy red;Drainage 08/08/23 1030   Alison-wound Assessment Dry 08/08/23 1030   Wound Length (cm) 0.5 cm 08/07/23 1734   Wound Width (cm) 5 cm 08/07/23 1734   Wound Depth (cm) 0.1 cm 08/07/23 1734   Wound Surface Area (cm^2) 2.5 cm^2 08/07/23 1734   Wound Volume (cm^3) 0.25 cm^3 08/07/23 1734   Calculated Wound Volume (cm^3) 0.25 cm^3 08/07/23 1734   Drainage Amount Small 08/08/23 1030   Drainage Description Serosanguineous 08/08/23 1030   Non-staged Wound Description Partial thickness 08/07/23 1734   Treatments Cleansed 08/08/23 1030   Dressing ABD 08/08/23 1030   Dressing Changed Changed 08/08/23 1030   Patient Tolerance Tolerated well 08/07/23 1734       Wound 03/22/23 Abdomen Right;Lateral (Active)       Wound 08/05/23 MASD Hip Right;Posterior (Active)   Wound Description Beefy red;Bleeding;Edema 08/08/23 1030   Alison-wound Assessment Erythema;Edema 08/08/23 1030   Drainage Amount Small 08/07/23 1732   Drainage Description Bloody 08/08/23 1030   Non-staged Wound Description Partial thickness 08/07/23 1732   Treatments Cleansed 08/08/23 1030   Dressing Non adherent;ABD 08/08/23 1030   Dressing Changed Changed 08/08/23 1030   Patient Tolerance Tolerated well 08/07/23 1732       Wound 08/05/23 MASD Pelvis Anterior; Left (Active)   Wound Image   08/07/23 9021   Wound Description Beefy red;Bleeding 08/08/23 1030   Aliosn-wound Assessment Erythema; Excoriated 08/08/23 1030   Wound Length (cm) 1 cm 08/07/23 1732   Wound Width (cm) 7 cm 08/07/23 1732   Wound Depth (cm) 0.1 cm 08/07/23 1732   Wound Surface Area (cm^2) 7 cm^2 08/07/23 1732   Wound Volume (cm^3) 0.7 cm^3 08/07/23 1732   Calculated Wound Volume (cm^3) 0.7 cm^3 08/07/23 1732   Change in Wound Size % 13.58 08/07/23 1732   Drainage Amount Small 08/08/23 1030   Drainage Description Bloody 08/08/23 1030   Non-staged Wound Description Partial thickness 08/07/23 1732   Treatments Cleansed 08/08/23 1030   Dressing ABD 08/08/23 1030   Dressing Changed Changed 08/08/23 1030   Patient Tolerance Tolerated well 08/07/23 1732       Wound 08/05/23 MASD Abdomen Medial;Lower (Active)   Wound Image   08/07/23 1729   Wound Description Beefy red 08/08/23 1030   Alison-wound Assessment Fragile 08/08/23 1030   Wound Length (cm) 1.4 cm 08/07/23 1729   Wound Width (cm) 7.5 cm 08/07/23 1729   Wound Depth (cm) 0.1 cm 08/07/23 1729   Wound Surface Area (cm^2) 10.5 cm^2 08/07/23 1729   Wound Volume (cm^3) 1.05 cm^3 08/07/23 1729   Calculated Wound Volume (cm^3) 1.05 cm^3 08/07/23 1729   Drainage Amount None 08/08/23 1030   Drainage Description Bloody 08/07/23 1729   Non-staged Wound Description Partial thickness 08/07/23 1729   Treatments Cleansed 08/08/23 1030   Dressing ABD 08/08/23 1030   Dressing Changed Changed 08/08/23 1030   Patient Tolerance Tolerated well 08/07/23 1729       Wound 08/05/23 MASD Lymphedema Hip Right (Active)   Wound Image   08/05/23 2042   Wound Description Beefy red;Drainage 08/08/23 1030   Alison-wound Assessment Erythema;Edema;Fragile 08/08/23 1030   Drainage Amount Moderate 08/08/23 1030   Drainage Description Serous 08/08/23 1030   Treatments Cleansed 08/08/23 1030   Dressing Open to air 08/08/23 1030   Dressing Changed Changed 08/08/23 1030   Patient Tolerance Tolerated well 08/07/23 1729       Wound 08/05/23 Other (comment) Other (Comment) Back Right (Active)   Wound Image    08/07/23 1750   Wound Description Edema;Fragile;Light purple 08/08/23 1030   Alison-wound Assessment Erythema;Edema;Fragile 08/08/23 1030   Drainage Amount Small 08/08/23 1030   Drainage Description Serous 08/08/23 1030   Treatments Cleansed 08/08/23 1030   Dressing ABD 08/08/23 1030   Dressing Changed Changed 08/08/23 1030   Patient Tolerance Tolerated well 08/07/23 1729       Wound 08/07/23 Breast Right; Lower (Active)   Wound Image   08/07/23 1736   Wound Description Beefy red;Bleeding 08/08/23 1030   Alison-wound Assessment Edema 08/08/23 1030   Wound Length (cm) 0.5 cm 08/07/23 1736   Wound Width (cm) 13.5 cm 08/07/23 1736   Wound Depth (cm) 0.1 cm 08/07/23 1736   Wound Surface Area (cm^2) 6.75 cm^2 08/07/23 1736   Wound Volume (cm^3) 0.675 cm^3 08/07/23 1736   Calculated Wound Volume (cm^3) 0.68 cm^3 08/07/23 1736   Drainage Amount Small 08/08/23 1030   Drainage Description Bloody 08/08/23 1030   Non-staged Wound Description Partial thickness 08/07/23 1736   Treatments Cleansed 08/08/23 1030   Dressing ABD 08/08/23 1030   Dressing Changed New 08/07/23 1736   Patient Tolerance Tolerated well 08/07/23 1736     Reviewed plan of care with primary RN Valerio  Recommendations written as orders  Wound care team to follow weekly while admitted  Questions or concerns [facility] Nurse    Gilson Underwood BSN, RN, Bruna Bauman

## 2023-08-08 NOTE — ASSESSMENT & PLAN NOTE
· Present on admission with erythema/tenderness to right truncal area  · History of MRSA  · ID following, appreciate recommendations  · Patient was receiving IV vancomycin and cefepime, discontinued by ID on 8/7  · Continue Valtrex and linezolid on discharge per ID orders

## 2023-08-08 NOTE — ASSESSMENT & PLAN NOTE
· Patient was on famciclovir prior to admission with no significant improvement  · ID following, appreciate recommendations  · Continue Valtrex and Zyvox regimen per ID on discharge  · Continue gabapentin on discharge

## 2023-08-08 NOTE — PROGRESS NOTES
[address]  Progress Note  Name: Wayne Shen  MRN: [id number]  Unit/Bed#: -01 I Date of Admission: 8/5/2023   Date of Service: 8/8/2023 I Hospital Day: 3    Assessment/Plan   * Cellulitis  Assessment & Plan  · Present on admission with erythema/tenderness to right truncal area  · History of MRSA  · ID following, appreciate recommendations  · Patient was receiving IV vancomycin and cefepime, discontinued by ID on 8/7  · ID started p.o. Valtrex and linezolid 8/7, will continue  · Wound culture positive Pseudomonas aeruginosa   · Continue local wound care  · Continue pain control as ordered    Shingles  Assessment & Plan  · Patient was on famciclovir prior to admission with no significant improvement  · As of today, she continues to complain of pain right lower back  · ID following, appreciate recommendations  · Continue Valtrex  · Continue gabapentin  · Continue isolation precautions    History of Clostridioides difficile infection  Assessment & Plan  · Continue oral vancomycin 125 mg PO BID for prophylaxis      Depression with anxiety  Assessment & Plan  · Currently without signs of depression, pleasant and talkative   · Continue Cymbalta    Chronic atrial fibrillation (HCC)  Assessment & Plan  · Currently rate controlled    · INR today 2.16  · Coumadin 3 mg today  · INR in a.m.    BMI 70 and over, adult ([address])  Assessment & Plan  · BMI 77.59  · Follow-up with PCP for possible outpatient weight loss program  · Counseled on diet and lifestyle changes, exercise           VTE Pharmacologic Prophylaxis:   Pharmacologic: Warfarin (Coumadin)  Mechanical VTE Prophylaxis in Place: Yes    Patient Centered Rounds: I have performed bedside rounds with nursing staff today.     Discussions with Specialists or Other Care Team Provider: ID, nursing, case management    Education and Discussions with Family / Patient: Treatment plan discussed with patient who understands the plan as its been explained and is agreeable to the plan as stated. All questions answered to satisfaction. Time Spent for Care: 40 minutes. More than 50% of total time spent on counseling and coordination of care as described above. Current Length of Stay: 3 day(s)    Current Patient Status: Inpatient   Certification Statement: The patient will continue to require additional inpatient hospital stay due to Trending fever, trending WBCs, trending procalcitonin, continues on antivirals, ID following    Discharge Plan: Plan is for patient to return home with a when medically stable for discharge    Code Status: Level 1 - Full Code      Subjective:   Pleasant and talkative, does report pain in her lower back today, currently 5/10 but worse with moving in bed. Objective:     Vitals:   Temp (24hrs), Av.4 °F (37.4 °C), Min:98.9 °F (37.2 °C), Max:99.7 °F (37.6 °C)    Temp:  [98.9 °F (37.2 °C)-99.7 °F (37.6 °C)] 98.9 °F (37.2 °C)  HR:  [80-91] 82  Resp:  [18-19] 19  BP: ()/(51-56) 112/53  SpO2:  [94 %-96 %] 95 %  Body mass index is 77.59 kg/m². Input and Output Summary (last 24 hours): Intake/Output Summary (Last 24 hours) at 2023 1624  Last data filed at 2023 1219  Gross per 24 hour   Intake 480 ml   Output 500 ml   Net -20 ml       Physical Exam:     Physical Exam  Vitals and nursing note reviewed. Constitutional:       General: She is not in acute distress. Appearance: She is obese. She is not ill-appearing. HENT:      Head: Normocephalic and atraumatic. Nose: Nose normal.      Mouth/Throat:      Mouth: Mucous membranes are moist.   Cardiovascular:      Rate and Rhythm: Normal rate and regular rhythm. Pulses: Normal pulses. Heart sounds: Normal heart sounds. Pulmonary:      Effort: Pulmonary effort is normal. No respiratory distress. Breath sounds: No wheezing or rales.       Comments: Decreased breath sounds bilateral bases, no cough or shortness of breath  Abdominal: General: Bowel sounds are normal. There is no distension. Palpations: Abdomen is soft. Tenderness: There is no abdominal tenderness. There is no guarding. Musculoskeletal:         General: Normal range of motion. Skin:     General: Skin is warm and dry. Capillary Refill: Capillary refill takes less than 2 seconds. Findings: Erythema present. Comments: Erythema noted on back; no open areas and the abdominal fold, mild erythema noted   Neurological:      General: No focal deficit present. Mental Status: She is alert and oriented to person, place, and time. Mental status is at baseline. Psychiatric:         Mood and Affect: Mood normal.         Behavior: Behavior normal.         Thought Content: Thought content normal.         Judgment: Judgment normal.         Additional Data:     Labs:    Results from last 7 days   Lab Units 08/08/23  0500   WBC Thousand/uL 15.26*   HEMOGLOBIN g/dL 10.6*   HEMATOCRIT % 37.0   PLATELETS Thousands/uL 399*   NEUTROS PCT % 87*   LYMPHS PCT % 5*   MONOS PCT % 5   EOS PCT % 2     Results from last 7 days   Lab Units 08/08/23  0500   POTASSIUM mmol/L 4.2   CHLORIDE mmol/L 97   CO2 mmol/L 29   BUN mg/dL 25   CREATININE mg/dL 1.32*   CALCIUM mg/dL 8.3*     Results from last 7 days   Lab Units 08/08/23  0500   INR  2.16*       * I Have Reviewed All Lab Data Listed Above. * Additional Pertinent Lab Tests Reviewed: [facility] Admission Reviewed    Imaging:    Imaging Reports Reviewed Today Include:   Imaging Personally Reviewed by Myself Includes:      Recent Cultures (last 7 days):     Results from last 7 days   Lab Units 08/06/23  1832 08/06/23  1821 08/05/23  1732 08/05/23  1721   BLOOD CULTURE  No Growth at 24 hrs. No Growth at 24 hrs.  --  No Growth at 48 hrs. No Growth at 48 hrs.    GRAM STAIN RESULT   --   --  No Polys or Bacteria seen  --    WOUND CULTURE   --   --  Few Colonies of Pseudomonas aeruginosa*  1+ Growth of Staphylococcus aureus*  1+ Growth of  --        Last 24 Hours Medication List:   Current Facility-Administered Medications   Medication Dose Route Frequency Provider Last Rate   • acetaminophen  650 mg Oral Q6H PRN Fili Cantu MD     • allopurinol  100 mg Oral Daily Fili Cantu MD     • DULoxetine  30 mg Oral Daily Fili Cantu MD     • furosemide  40 mg Oral Daily Fili Cantu MD     • gabapentin  100 mg Oral TID Fili Cantu MD     • levothyroxine  125 mcg Oral Early Morning Fili Cantu MD     • linezolid  300 mg Oral Q12H [address] Isaiah Hill MD     • ondansetron  4 mg Intravenous Q6H PRN Fili Cantu MD     • oxyCODONE  10 mg Oral Q4H PRN ORACIO Last     • oxyCODONE  5 mg Oral Q4H PRN ORACIO Last     • valACYclovir  1,000 mg Oral Q8H [address] Wilian Zapata PA-C     • vancomycin oral (capsules or solution)  125 mg Oral Q12H [address] Wilian Zapata PA-C     • warfarin  3 mg Oral Once (warfarin) ORACIO Last          Today, Patient Was Seen By: ORACIO Blakely    ** Please Note: Dictation voice to text software may have been used in the creation of this document.  **

## 2023-08-08 NOTE — PROGRESS NOTES
Progress Note - [facility] Infectious Disease   Jennifer Huge 71 y.o. female MRN: [id number]  Unit/Bed#: -01 Encounter: [id number]      IMPRESSION & RECOMMENDATIONS:   1. Sepsis, developing over admission, e/b fever, tachycardia, leukocytosis. Most likely related to #2. Blood cultures are negative. Consider possibility of ongoing viral process with possible overlying cellulitis. Wound cx likely represents colonization. She is otherwise hemodynamically stable. WBC down trending.    -continue antimicrobials as below   -follow-up cultures and adjust antibiotics as needed  -monitor CBCd, temperature and hemodynamics  -supportive care per primary team      2. Rash with possible superimposed cellulitis. Patient with erythematous patches of weeping, denuded skin on right shoulder and trunk extending to the low back. Consider possibility of an atypical varicella zoster infection with possible superimposed streptococcal infection. Consider possibility of a volume issue given her chronic dependent, bed bound status. She also reports a new mattress at home, thus consider possibility of a contact dermatitis. This is complicated by her hx of morbid obesity and antibiotic allergies. Suspect wound cx likely represents colonization. Appreciate input from wound care RN, clinical media reviewed. -continue PO Valtrex 1g TID   -continue PO Linezolid 300mg BID  -follow-up blood cultures   -serial skin exams and LWC   -pain control per primary team   -continue isolation precautions given ongoing wound drainage      3. Hx of panniculitis, lower abdominal cellulitis. Currently low suspicion for acute infection. Suspect erythema and weeping now is related to volume and less likely cellulitis. Continue local wound care.     4. Hx of C Diff. Continue PO Vanco ppx for 72h post systemic abx.      5. Morbid obesity. BMI 77.59. Dose adjust abx as necessary.      6. Antibiotic Allergies. Hx of anaphylaxis with several abx classes. She was just tolerating IV vanco and cefepime. Monitor for any adverse drug reactions. Antibiotics:  D4 abx   D2 valtrex   D2 linezolid   D4 PO vanco     I have discussed the above management plan in detail with patient. I have discussed the above management plan in detail with patient's RN, and the primary service, [facility].     24 Hour events:  Yesterday and overnight notes reviewed. Adjusted abx yesterday. Tolerating thus far. Subjective:  Patient has no fever, chills, sweats; no nausea, vomiting, diarrhea; no cough, shortness of breath; pain slightly improved today. Has dry MM and sore throat, congestion today. Tolerating antimicrobials. Rash less burning. Objective:  Vitals:  Temp:  [99.6 °F (37.6 °C)-100.7 °F (38.2 °C)] 99.6 °F (37.6 °C)  HR:  [79-91] 80  Resp:  [17-18] 18  BP: ()/(51-57) 99/51  SpO2:  [94 %-96 %] 95 %  Temp (24hrs), Av °F (37.8 °C), Min:99.6 °F (37.6 °C), Max:100.7 °F (38.2 °C)  Current: Temperature: 99.6 °F (37.6 °C)    PHYSICAL EXAM:  General Appearance:  Appearing chronically ill and debilitated due to body habitus, nontoxic, and in no distress   HEENT: Normocephalic, without obvious abnormality, atraumatic. Conjunctiva pink and sclera anicteric. Oropharynx moist without lesions. Lungs:   Clear to auscultation bilaterally, respirations unlabored   Heart:  RRR; no murmur, rub or gallop   Abdomen:   Soft, non-tender, obese, non-distended, positive bowel sounds    Extremities: No cyanosis, clubbing or edema   Musculoskeletal: Back symmetric without curvature, ROM normal.    Skin: Right shoulder to hip areas of erythematous, patchy weeping denuded skin without evidence of acute cellulitis. No vesicular or crusted lesions. Few areas of bleeding noted closer to low back/hip. skin of b/l pannus with improved erythema without warmth. Peripheral IV intact without evidence of erythema, warmth, or exudate.         LABS, IMAGING, & OTHER STUDIES:  Extensive review of the medical records in epic including review of the notes, radiographs, and laboratory results were reviewed personally as below. Lab Results:  I have personally reviewed pertinent labs. Results from last 7 days   Lab Units 08/08/23  0500 08/07/23  0535 08/06/23  0546   WBC Thousand/uL 15.26* 23.14* 11.39*   HEMOGLOBIN g/dL 10.6* 10.8* 10.6*   PLATELETS Thousands/uL 399* 400* 430*     Results from last 7 days   Lab Units 08/08/23  0500 08/07/23  0535 08/06/23  0546   SODIUM mmol/L 132* 135 135   POTASSIUM mmol/L 4.2 4.4 4.2   CHLORIDE mmol/L 97 97 99   CO2 mmol/L 29 31 31   BUN mg/dL 25 24 22   CREATININE mg/dL 1.32* 1.23 1.04   EGFR ml/min/1.73sq m 40 44 54   CALCIUM mg/dL 8.3* 8.7 8.4     Results from last 7 days   Lab Units 08/06/23  1832 08/06/23  1821 08/05/23  1732 08/05/23  1721   BLOOD CULTURE  No Growth at 24 hrs. No Growth at 24 hrs.  --  No Growth at 48 hrs. No Growth at 48 hrs. GRAM STAIN RESULT   --   --  No Polys or Bacteria seen  --    WOUND CULTURE   --   --  Few Colonies of Pseudomonas aeruginosa*  1+ Growth of Staphylococcus aureus*  1+ Growth of  --    MRSA CULTURE ONLY   --   --   --  Culture results to follow. Results from last 7 days   Lab Units 08/08/23  0500 08/07/23  0535 08/06/23  1820   PROCALCITONIN ng/ml 0.40* 0.37* 0.09                   Imaging Studies:   I have personally reviewed pertinent imaging study reports and images in PACS. Other Studies:   I have personally reviewed pertinent report including blood cx, wound cx, clinical media, wound care RN assessment.

## 2023-08-08 NOTE — CONSULTS
The patient’s Vancomycin therapy has been completed / discontinued. Thank you for this consult; Pharmacy will sign-off now.     Deisy Arriaza PharmD

## 2023-08-08 NOTE — PLAN OF CARE
Problem: Potential for Falls  Goal: Patient will remain free of falls  Description: INTERVENTIONS:  - Educate patient/family on patient safety including physical limitations  - Instruct patient to call for assistance with activity   - Consult OT/PT to assist with strengthening/mobility   - Keep Call bell within reach  - Keep bed low and locked with side rails adjusted as appropriate  - Keep care items and personal belongings within reach  - Initiate and maintain comfort rounds  - Make Fall Risk Sign visible to staff  - Offer Toileting every 2 Hours, in advance of need  - Initiate/Maintain 2alarm  - Obtain necessary fall risk management equipment: 2  - Apply yellow socks and bracelet for high fall risk patients  - Consider moving patient to room near nurses station  Outcome: Progressing     Problem: MOBILITY - ADULT  Goal: Maintain or return to baseline ADL function  Description: INTERVENTIONS:  -  Assess patient's ability to carry out ADLs; assess patient's baseline for ADL function and identify physical deficits which impact ability to perform ADLs (bathing, care of mouth/teeth, toileting, grooming, dressing, etc.)  - Assess/evaluate cause of self-care deficits   - Assess range of motion  - Assess patient's mobility; develop plan if impaired  - Assess patient's need for assistive devices and provide as appropriate  - Encourage maximum independence but intervene and supervise when necessary  - Involve family in performance of ADLs  - Assess for home care needs following discharge   - Consider OT consult to assist with ADL evaluation and planning for discharge  - Provide patient education as appropriate  Outcome: Progressing  Goal: Maintains/Returns to pre admission functional level  Description: INTERVENTIONS:  - Perform BMAT or MOVE assessment daily.   - Set and communicate daily mobility goal to care team and patient/family/caregiver.    - Collaborate with rehabilitation services on mobility goals if consulted  - Perform Range of Motion 2 times a day. - Reposition patient every 2 hours. - Dangle patient 2 times a day  - Stand patient 2 times a day  - Ambulate patient 2 times a day  - Out of bed to chair 2 times a day   - Out of bed for meals 2 times a day  - Out of bed for toileting  - Record patient progress and toleration of activity level   Outcome: Progressing     Problem: DISCHARGE PLANNING  Goal: Discharge to home or other facility with appropriate resources  Description: INTERVENTIONS:  - Identify barriers to discharge w/patient and caregiver  - Arrange for needed discharge resources and transportation as appropriate  - Identify discharge learning needs (meds, wound care, etc.)  - Arrange for interpretive services to assist at discharge as needed  - Refer to Case Management Department for coordinating discharge planning if the patient needs post-hospital services based on physician/advanced practitioner order or complex needs related to functional status, cognitive ability, or social support system  Outcome: Progressing     Problem: Knowledge Deficit  Goal: Patient/family/caregiver demonstrates understanding of disease process, treatment plan, medications, and discharge instructions  Description: Complete learning assessment and assess knowledge base.   Interventions:  - Provide teaching at level of understanding  - Provide teaching via preferred learning methods  Outcome: Progressing     Problem: SKIN/TISSUE INTEGRITY - ADULT  Goal: Skin Integrity remains intact(Skin Breakdown Prevention)  Description: Assess:  -Perform Gucci assessment every 2  -Clean and moisturize skin every 2  -Inspect skin when repositioning, toileting, and assisting with ADLS  -Assess under medical devices such as 2 every 2  -Assess extremities for adequate circulation and sensation     Bed Management:  -Have minimal linens on bed & keep smooth, unwrinkled  -Change linens as needed when moist or perspiring  -Avoid sitting or lying in one position for more than 2 hours while in bed  -Keep HOB at 2degrees     Toileting:  -Offer bedside commode  -Assess for incontinence every 2  -Use incontinent care products after each incontinent episode such as 2    Activity:  -Mobilize patient 2 times a day  -Encourage activity and walks on unit  -Encourage or provide ROM exercises   -Turn and reposition patient every 2 Hours  -Use appropriate equipment to lift or move patient in bed  -Instruct/ Assist with weight shifting every 2 when out of bed in chair  -Consider limitation of chair time 2 hour intervals    Skin Care:  -Avoid use of baby powder, tape, friction and shearing, hot water or constrictive clothing  -Relieve pressure over bony prominences using 2  -Do not massage red bony areas    Next Steps:  -Teach patient strategies to minimize risks such as 2   -Consider consults to  interdisciplinary teams such as 2  Outcome: Progressing     Problem: Prexisting or High Potential for Compromised Skin Integrity  Goal: Skin integrity is maintained or improved  Description: INTERVENTIONS:  - Identify patients at risk for skin breakdown  - Assess and monitor skin integrity  - Assess and monitor nutrition and hydration status  - Monitor labs   - Assess for incontinence   - Turn and reposition patient  - Assist with mobility/ambulation  - Relieve pressure over bony prominences  - Avoid friction and shearing  - Provide appropriate hygiene as needed including keeping skin clean and dry  - Evaluate need for skin moisturizer/barrier cream  - Collaborate with interdisciplinary team   - Patient/family teaching  - Consider wound care consult   Outcome: Progressing     Problem: Nutrition/Hydration-ADULT  Goal: Nutrient/Hydration intake appropriate for improving, restoring or maintaining nutritional needs  Description: Monitor and assess patient's nutrition/hydration status for malnutrition. Collaborate with interdisciplinary team and initiate plan and interventions as ordered. Monitor patient's weight and dietary intake as ordered or per policy. Utilize nutrition screening tool and intervene as necessary. Determine patient's food preferences and provide high-protein, high-caloric foods as appropriate.      INTERVENTIONS:  - Monitor oral intake, urinary output, labs, and treatment plans  - Assess nutrition and hydration status and recommend course of action  - Evaluate amount of meals eaten  - Assist patient with eating if necessary   - Allow adequate time for meals  - Recommend/ encourage appropriate diets, oral nutritional supplements, and vitamin/mineral supplements  - Order, calculate, and assess calorie counts as needed  - Recommend, monitor, and adjust tube feedings and TPN/PPN based on assessed needs  - Assess need for intravenous fluids  - Provide specific nutrition/hydration education as appropriate  - Include patient/family/caregiver in decisions related to nutrition  Outcome: Progressing

## 2023-08-08 NOTE — DISCHARGE INSTR - OTHER ORDERS
Skin and Wound Care Plan:   1. Apply skin nourishing cream daily  2. Elevate heels off of bed with pillows to offload  3. Turn and reposition patient Q2 hours  4. Cleanse back and right lateral buttocks with Remedy foaming cleanser daily, pat dry. Place non-adherent oil emulsion dressing on open areas and place ABD pads that have been taped together to make a large dressing on the lower back, change daily and PRN  5. Cleanse abdominal, breast and upper thigh folds with Remedy foaming cleanser, pat dry. Place ABD pads in the folds at the open wound beds, change daily and PRN. May apply Kenalog cream to the open areas when ordered by provider  6.  Bariatric bed

## 2023-08-08 NOTE — PLAN OF CARE
Problem: Potential for Falls  Goal: Patient will remain free of falls  Description: INTERVENTIONS:  - Educate patient/family on patient safety including physical limitations  - Instruct patient to call for assistance with activity   - Consult OT/PT to assist with strengthening/mobility   - Keep Call bell within reach  - Keep bed low and locked with side rails adjusted as appropriate  - Keep care items and personal belongings within reach  - Initiate and maintain comfort rounds  - Make Fall Risk Sign visible to staff  - Offer Toileting every 2 Hours, in advance of need  - Initiate/Maintain bed alarm  - Obtain necessary fall risk management equipment:   - Apply yellow socks and bracelet for high fall risk patients  - Consider moving patient to room near nurses station  Outcome: Progressing     Problem: MOBILITY - ADULT  Goal: Maintain or return to baseline ADL function  Description: INTERVENTIONS:  -  Assess patient's ability to carry out ADLs; assess patient's baseline for ADL function and identify physical deficits which impact ability to perform ADLs (bathing, care of mouth/teeth, toileting, grooming, dressing, etc.)  - Assess/evaluate cause of self-care deficits   - Assess range of motion  - Assess patient's mobility; develop plan if impaired  - Assess patient's need for assistive devices and provide as appropriate  - Encourage maximum independence but intervene and supervise when necessary  - Involve family in performance of ADLs  - Assess for home care needs following discharge   - Consider OT consult to assist with ADL evaluation and planning for discharge  - Provide patient education as appropriate  Outcome: Progressing  Goal: Maintains/Returns to pre admission functional level  Description: INTERVENTIONS:  - Perform BMAT or MOVE assessment daily.   - Set and communicate daily mobility goal to care team and patient/family/caregiver.    - Collaborate with rehabilitation services on mobility goals if consulted  - Perform Range of Motion 3 times a day. - Reposition patient every 2 hours. - Dangle patient 3 times a day  - Stand patient 3 times a day  - Ambulate patient 3 times a day  - Out of bed to chair 3 times a day   - Out of bed for meals 3 times a day  - Out of bed for toileting  - Record patient progress and toleration of activity level   Outcome: Progressing     Problem: DISCHARGE PLANNING  Goal: Discharge to home or other facility with appropriate resources  Description: INTERVENTIONS:  - Identify barriers to discharge w/patient and caregiver  - Arrange for needed discharge resources and transportation as appropriate  - Identify discharge learning needs (meds, wound care, etc.)  - Arrange for interpretive services to assist at discharge as needed  - Refer to Case Management Department for coordinating discharge planning if the patient needs post-hospital services based on physician/advanced practitioner order or complex needs related to functional status, cognitive ability, or social support system  Outcome: Progressing     Problem: Knowledge Deficit  Goal: Patient/family/caregiver demonstrates understanding of disease process, treatment plan, medications, and discharge instructions  Description: Complete learning assessment and assess knowledge base.   Interventions:  - Provide teaching at level of understanding  - Provide teaching via preferred learning methods  Outcome: Progressing     Problem: SKIN/TISSUE INTEGRITY - ADULT  Goal: Skin Integrity remains intact(Skin Breakdown Prevention)  Description: Assess:  -Perform Gucci assessment every   -Clean and moisturize skin every   -Inspect skin when repositioning, toileting, and assisting with ADLS  -Assess under medical devices such as  every   -Assess extremities for adequate circulation and sensation     Bed Management:  -Have minimal linens on bed & keep smooth, unwrinkled  -Change linens as needed when moist or perspiring  -Avoid sitting or lying in one position for more than  hours while in bed  -Keep HOB at degrees     Toileting:  -Offer bedside commode  -Assess for incontinence every   -Use incontinent care products after each incontinent episode such as     Activity:  -Mobilize patient  times a day  -Encourage activity and walks on unit  -Encourage or provide ROM exercises   -Turn and reposition patient every  Hours  -Use appropriate equipment to lift or move patient in bed  -Instruct/ Assist with weight shifting every  when out of bed in chair  -Consider limitation of chair time  hour intervals    Skin Care:  -Avoid use of baby powder, tape, friction and shearing, hot water or constrictive clothing  -Relieve pressure over bony prominences using   -Do not massage red bony areas    Next Steps:  -Teach patient strategies to minimize risks such as    -Consider consults to  interdisciplinary teams such as   Outcome: Progressing     Problem: Prexisting or High Potential for Compromised Skin Integrity  Goal: Skin integrity is maintained or improved  Description: INTERVENTIONS:  - Identify patients at risk for skin breakdown  - Assess and monitor skin integrity  - Assess and monitor nutrition and hydration status  - Monitor labs   - Assess for incontinence   - Turn and reposition patient  - Assist with mobility/ambulation  - Relieve pressure over bony prominences  - Avoid friction and shearing  - Provide appropriate hygiene as needed including keeping skin clean and dry  - Evaluate need for skin moisturizer/barrier cream  - Collaborate with interdisciplinary team   - Patient/family teaching  - Consider wound care consult   Outcome: Progressing     Problem: Nutrition/Hydration-ADULT  Goal: Nutrient/Hydration intake appropriate for improving, restoring or maintaining nutritional needs  Description: Monitor and assess patient's nutrition/hydration status for malnutrition. Collaborate with interdisciplinary team and initiate plan and interventions as ordered.   Monitor patient's weight and dietary intake as ordered or per policy. Utilize nutrition screening tool and intervene as necessary. Determine patient's food preferences and provide high-protein, high-caloric foods as appropriate.      INTERVENTIONS:  - Monitor oral intake, urinary output, labs, and treatment plans  - Assess nutrition and hydration status and recommend course of action  - Evaluate amount of meals eaten  - Assist patient with eating if necessary   - Allow adequate time for meals  - Recommend/ encourage appropriate diets, oral nutritional supplements, and vitamin/mineral supplements  - Order, calculate, and assess calorie counts as needed  - Recommend, monitor, and adjust tube feedings and TPN/PPN based on assessed needs  - Assess need for intravenous fluids  - Provide specific nutrition/hydration education as appropriate  - Include patient/family/caregiver in decisions related to nutrition  Outcome: Progressing

## 2023-08-08 NOTE — ASSESSMENT & PLAN NOTE
· Currently without signs of depression, pleasant and talkative   · Continue home Cymbalta on discharge

## 2023-08-08 NOTE — ASSESSMENT & PLAN NOTE
· Currently rate controlled    · Continue home regimen on discharge  · Further monitoring of INR to be determined by PCP in outpatient setting

## 2023-08-09 LAB
ALBUMIN SERPL BCP-MCNC: 2.6 G/DL (ref 3.5–5)
ALP SERPL-CCNC: 106 U/L (ref 34–104)
ALT SERPL W P-5'-P-CCNC: 13 U/L (ref 7–52)
ANION GAP SERPL CALCULATED.3IONS-SCNC: 6 MMOL/L
AST SERPL W P-5'-P-CCNC: 19 U/L (ref 13–39)
BACTERIA WND AEROBE CULT: ABNORMAL
BASOPHILS # BLD AUTO: 0.03 THOUSANDS/ÂΜL (ref 0–0.1)
BASOPHILS NFR BLD AUTO: 0 % (ref 0–1)
BILIRUB SERPL-MCNC: 0.52 MG/DL (ref 0.2–1)
BUN SERPL-MCNC: 29 MG/DL (ref 5–25)
CALCIUM ALBUM COR SERPL-MCNC: 9.6 MG/DL (ref 8.3–10.1)
CALCIUM SERPL-MCNC: 8.5 MG/DL (ref 8.4–10.2)
CHLORIDE SERPL-SCNC: 96 MMOL/L (ref 96–108)
CO2 SERPL-SCNC: 30 MMOL/L (ref 21–32)
CREAT SERPL-MCNC: 1.37 MG/DL (ref 0.6–1.3)
EOSINOPHIL # BLD AUTO: 0.33 THOUSAND/ÂΜL (ref 0–0.61)
EOSINOPHIL NFR BLD AUTO: 3 % (ref 0–6)
ERYTHROCYTE [DISTWIDTH] IN BLOOD BY AUTOMATED COUNT: 17.1 % (ref 11.6–15.1)
GFR SERPL CREATININE-BSD FRML MDRD: 38 ML/MIN/1.73SQ M
GLUCOSE SERPL-MCNC: 99 MG/DL (ref 65–140)
GRAM STN SPEC: ABNORMAL
HCT VFR BLD AUTO: 34.1 % (ref 34.8–46.1)
HGB BLD-MCNC: 10 G/DL (ref 11.5–15.4)
IMM GRANULOCYTES # BLD AUTO: 0.1 THOUSAND/UL (ref 0–0.2)
IMM GRANULOCYTES NFR BLD AUTO: 1 % (ref 0–2)
INR PPP: 1.96 (ref 0.84–1.19)
LYMPHOCYTES # BLD AUTO: 0.8 THOUSANDS/ÂΜL (ref 0.6–4.47)
LYMPHOCYTES NFR BLD AUTO: 6 % (ref 14–44)
MCH RBC QN AUTO: 24.5 PG (ref 26.8–34.3)
MCHC RBC AUTO-ENTMCNC: 29.3 G/DL (ref 31.4–37.4)
MCV RBC AUTO: 84 FL (ref 82–98)
MONOCYTES # BLD AUTO: 0.75 THOUSAND/ÂΜL (ref 0.17–1.22)
MONOCYTES NFR BLD AUTO: 6 % (ref 4–12)
MRSA NOSE QL CULT: NORMAL
NEUTROPHILS # BLD AUTO: 11.05 THOUSANDS/ÂΜL (ref 1.85–7.62)
NEUTS SEG NFR BLD AUTO: 84 % (ref 43–75)
NRBC BLD AUTO-RTO: 0 /100 WBCS
PLATELET # BLD AUTO: 406 THOUSANDS/UL (ref 149–390)
PMV BLD AUTO: 8.6 FL (ref 8.9–12.7)
POTASSIUM SERPL-SCNC: 4.3 MMOL/L (ref 3.5–5.3)
PROT SERPL-MCNC: 7.1 G/DL (ref 6.4–8.4)
PROTHROMBIN TIME: 22.2 SECONDS (ref 11.6–14.5)
RBC # BLD AUTO: 4.08 MILLION/UL (ref 3.81–5.12)
SODIUM SERPL-SCNC: 132 MMOL/L (ref 135–147)
WBC # BLD AUTO: 13.06 THOUSAND/UL (ref 4.31–10.16)

## 2023-08-09 PROCEDURE — 85025 COMPLETE CBC W/AUTO DIFF WBC: CPT | Performed by: INTERNAL MEDICINE

## 2023-08-09 PROCEDURE — 85610 PROTHROMBIN TIME: CPT

## 2023-08-09 PROCEDURE — 80053 COMPREHEN METABOLIC PANEL: CPT | Performed by: INTERNAL MEDICINE

## 2023-08-09 PROCEDURE — 99232 SBSQ HOSP IP/OBS MODERATE 35: CPT | Performed by: INTERNAL MEDICINE

## 2023-08-09 PROCEDURE — 99233 SBSQ HOSP IP/OBS HIGH 50: CPT

## 2023-08-09 RX ORDER — VALACYCLOVIR HYDROCHLORIDE 500 MG/1
1000 TABLET, FILM COATED ORAL EVERY 8 HOURS SCHEDULED
Status: DISCONTINUED | OUTPATIENT
Start: 2023-08-09 | End: 2023-08-10 | Stop reason: HOSPADM

## 2023-08-09 RX ORDER — TRIAMCINOLONE ACETONIDE 1 MG/G
CREAM TOPICAL 2 TIMES DAILY
Status: DISCONTINUED | OUTPATIENT
Start: 2023-08-09 | End: 2023-08-10 | Stop reason: HOSPADM

## 2023-08-09 RX ORDER — FUROSEMIDE 40 MG/1
40 TABLET ORAL DAILY
Status: DISCONTINUED | OUTPATIENT
Start: 2023-08-10 | End: 2023-08-10 | Stop reason: HOSPADM

## 2023-08-09 RX ORDER — VANCOMYCIN HYDROCHLORIDE 125 MG/1
125 CAPSULE ORAL EVERY 12 HOURS SCHEDULED
Status: DISCONTINUED | OUTPATIENT
Start: 2023-08-09 | End: 2023-08-10 | Stop reason: HOSPADM

## 2023-08-09 RX ADMIN — LINEZOLID 300 MG: 600 TABLET, FILM COATED ORAL at 21:54

## 2023-08-09 RX ADMIN — VALACYCLOVIR HYDROCHLORIDE 1000 MG: 500 TABLET, FILM COATED ORAL at 15:13

## 2023-08-09 RX ADMIN — GABAPENTIN 100 MG: 100 CAPSULE ORAL at 08:38

## 2023-08-09 RX ADMIN — LINEZOLID 300 MG: 600 TABLET, FILM COATED ORAL at 08:43

## 2023-08-09 RX ADMIN — VALACYCLOVIR HYDROCHLORIDE 1000 MG: 500 TABLET, FILM COATED ORAL at 21:54

## 2023-08-09 RX ADMIN — LEVOTHYROXINE SODIUM 125 MCG: 25 TABLET ORAL at 06:17

## 2023-08-09 RX ADMIN — DULOXETINE HYDROCHLORIDE 30 MG: 30 CAPSULE, DELAYED RELEASE ORAL at 08:38

## 2023-08-09 RX ADMIN — GABAPENTIN 100 MG: 100 CAPSULE ORAL at 21:54

## 2023-08-09 RX ADMIN — VANCOMYCIN HYDROCHLORIDE 125 MG: 125 CAPSULE ORAL at 08:38

## 2023-08-09 RX ADMIN — VANCOMYCIN HYDROCHLORIDE 125 MG: 125 CAPSULE ORAL at 21:54

## 2023-08-09 RX ADMIN — VALACYCLOVIR HYDROCHLORIDE 1000 MG: 500 TABLET, FILM COATED ORAL at 06:17

## 2023-08-09 RX ADMIN — OXYCODONE HYDROCHLORIDE 5 MG: 5 TABLET ORAL at 15:40

## 2023-08-09 RX ADMIN — ACETAMINOPHEN 650 MG: 325 TABLET ORAL at 06:17

## 2023-08-09 RX ADMIN — GABAPENTIN 100 MG: 100 CAPSULE ORAL at 15:13

## 2023-08-09 RX ADMIN — ALLOPURINOL 100 MG: 100 TABLET ORAL at 08:38

## 2023-08-09 NOTE — PROGRESS NOTES
Progress Note - [facility] Infectious Disease   Jeannie Olsen 71 y.o. female MRN: [id number]  Unit/Bed#: -01 Encounter: [id number]      IMPRESSION & RECOMMENDATIONS:   1. Sepsis, developing over admission, e/b fever, tachycardia, leukocytosis. Most likely related to #2. Blood cultures are negative. Consider possibility of ongoing viral process with possible overlying cellulitis. Wound cx likely represents colonization. She is otherwise hemodynamically stable. WBC down trending.    -continue antimicrobials as below   -monitor CBCd, temperature and hemodynamics  -supportive care per primary team      2. Rash with possible superimposed cellulitis. Patient with erythematous patches of weeping, denuded skin on right shoulder and trunk extending to the low back. Consider possibility of an atypical varicella zoster infection with possible superimposed streptococcal infection. Consider possibility of a volume issue given her chronic dependent, bed bound status. She also reports a new mattress at home, thus consider possibility of a contact dermatitis. This is complicated by her hx of morbid obesity and antibiotic allergies. Suspect wound cx likely represents colonization. Appreciate input from wound care RN, clinical media reviewed. -continue PO Valtrex 1g TID to complete 7 day course    -continue PO Linezolid 300mg BID to complete 10 day course   -continue to follow-up blood cultures   -serial skin exams and LWC   -pain control per primary team   -can discontinue isolation precautions today     3. Hx of panniculitis, lower abdominal cellulitis. Currently low suspicion for acute infection. Suspect erythema and weeping now is related to volume and less likely cellulitis. Continue local wound care.     4. Hx of C Diff. Continue PO Vanco ppx for 72h post systemic abx.      5. Morbid obesity. BMI 77.59. Dose adjust abx as necessary.      6. Antibiotic Allergies. Hx of anaphylaxis with several abx classes.  She was just tolerating IV vanco and cefepime. Monitor for any adverse drug reactions.     Antibiotics:  D5 abx   D3 valtrex   D3 linezolid   D5 PO vanco     I have discussed the above management plan in detail with patient. I have discussed the above management plan in detail with patient's RN, and the primary service, [facility].     24 Hour events:  Yesterday and overnight notes reviewed. Remains afebrile. Intermittently hypotensive. Subjective:  Patient has no fever, chills, sweats; no nausea, vomiting, diarrhea; no cough, shortness of breath; pain slowly improving and wounds no longer weeping on back. Has chronic weeping from her pannus worse on right. Otherwise no new symptoms. Objective:  Vitals:  Temp:  [98 °F (36.7 °C)-99.6 °F (37.6 °C)] 98 °F (36.7 °C)  HR:  [71-82] 71  Resp:  [17-19] 17  BP: ()/(38-53) 89/50  SpO2:  [91 %-97 %] 97 %  Temp (24hrs), Av.9 °F (37.2 °C), Min:98 °F (36.7 °C), Max:99.6 °F (37.6 °C)  Current: Temperature: 98 °F (36.7 °C)    PHYSICAL EXAM:  General Appearance:  Appearing chronically ill and debilitated, nontoxic, and in no distress   HEENT: Normocephalic, without obvious abnormality, atraumatic. Conjunctiva pink and sclera anicteric. Oropharynx moist without lesions. Lungs:   Clear to auscultation bilaterally, respirations unlabored on BL NC O2   Heart:  RRR; no murmur, rub or gallop   Abdomen:   Soft, non-tender, obese, non-distended, positive bowel sounds    Extremities: No cyanosis, clubbing or edema   Musculoskeletal: Back symmetric without curvature, ROM normal.    Skin: Right shoulder to hip areas of erythematous, patchy denuded No vesicular or crusted lesions. skin of b/l pannus with improved erythema without warmth. Peripheral IV intact without evidence of erythema, warmth, or exudate.         LABS, IMAGING, & OTHER STUDIES:  Extensive review of the medical records in epic including review of the notes, radiographs, and laboratory results were reviewed personally as below. Lab Results:  I have personally reviewed pertinent labs. Results from last 7 days   Lab Units 08/09/23  0447 08/08/23  0500 08/07/23  0535   WBC Thousand/uL 13.06* 15.26* 23.14*   HEMOGLOBIN g/dL 10.0* 10.6* 10.8*   PLATELETS Thousands/uL 406* 399* 400*     Results from last 7 days   Lab Units 08/09/23  0447 08/08/23  0500 08/07/23  0535   SODIUM mmol/L 132* 132* 135   POTASSIUM mmol/L 4.3 4.2 4.4   CHLORIDE mmol/L 96 97 97   CO2 mmol/L 30 29 31   BUN mg/dL 29* 25 24   CREATININE mg/dL 1.37* 1.32* 1.23   EGFR ml/min/1.73sq m 38 40 44   CALCIUM mg/dL 8.5 8.3* 8.7   AST U/L 19  --   --    ALT U/L 13  --   --    ALK PHOS U/L 106*  --   --      Results from last 7 days   Lab Units 08/06/23  1832 08/06/23  1821 08/05/23  1732 08/05/23  1721   BLOOD CULTURE  No Growth at 48 hrs. No Growth at 48 hrs. --  No Growth at 72 hrs. No Growth at 72 hrs. GRAM STAIN RESULT   --   --  No Polys or Bacteria seen  --    WOUND CULTURE   --   --  Few Colonies of Pseudomonas aeruginosa*  1+ Growth of Staphylococcus aureus*  1+ Growth of  --    MRSA CULTURE ONLY   --   --   --  No Methicillin Resistant Staphlyococcus aureus (MRSA) isolated     Results from last 7 days   Lab Units 08/08/23  0500 08/07/23  0535 08/06/23  1820   PROCALCITONIN ng/ml 0.40* 0.37* 0.09                   Imaging Studies:   I have personally reviewed pertinent imaging study reports and images in PACS. Other Studies:   I have personally reviewed pertinent report including blood cultures, wound culture.

## 2023-08-09 NOTE — PLAN OF CARE
Problem: Potential for Falls  Goal: Patient will remain free of falls  Description: INTERVENTIONS:  - Educate patient/family on patient safety including physical limitations  - Instruct patient to call for assistance with activity   - Consult OT/PT to assist with strengthening/mobility   - Keep Call bell within reach  - Keep bed low and locked with side rails adjusted as appropriate  - Keep care items and personal belongings within reach  - Initiate and maintain comfort rounds  - Make Fall Risk Sign visible to staff  - Apply yellow socks and bracelet for high fall risk patients  - Consider moving patient to room near nurses station  Outcome: Progressing     Problem: MOBILITY - ADULT  Goal: Maintain or return to baseline ADL function  Description: INTERVENTIONS:  -  Assess patient's ability to carry out ADLs; assess patient's baseline for ADL function and identify physical deficits which impact ability to perform ADLs (bathing, care of mouth/teeth, toileting, grooming, dressing, etc.)  - Assess/evaluate cause of self-care deficits   - Assess range of motion  - Assess patient's mobility; develop plan if impaired  - Assess patient's need for assistive devices and provide as appropriate  - Encourage maximum independence but intervene and supervise when necessary  - Involve family in performance of ADLs  - Assess for home care needs following discharge   - Consider OT consult to assist with ADL evaluation and planning for discharge  - Provide patient education as appropriate  Outcome: Progressing  Goal: Maintains/Returns to pre admission functional level  Description: INTERVENTIONS:  - Perform BMAT or MOVE assessment daily.   - Set and communicate daily mobility goal to care team and patient/family/caregiver.    - Collaborate with rehabilitation services on mobility goals if consulted  - Out of bed for toileting  - Record patient progress and toleration of activity level   Outcome: Progressing     Problem: DISCHARGE PLANNING  Goal: Discharge to home or other facility with appropriate resources  Description: INTERVENTIONS:  - Identify barriers to discharge w/patient and caregiver  - Arrange for needed discharge resources and transportation as appropriate  - Identify discharge learning needs (meds, wound care, etc.)  - Arrange for interpretive services to assist at discharge as needed  - Refer to Case Management Department for coordinating discharge planning if the patient needs post-hospital services based on physician/advanced practitioner order or complex needs related to functional status, cognitive ability, or social support system  Outcome: Progressing     Problem: Knowledge Deficit  Goal: Patient/family/caregiver demonstrates understanding of disease process, treatment plan, medications, and discharge instructions  Description: Complete learning assessment and assess knowledge base.   Interventions:  - Provide teaching at level of understanding  - Provide teaching via preferred learning methods  Outcome: Progressing     Problem: SKIN/TISSUE INTEGRITY - ADULT  Goal: Skin Integrity remains intact(Skin Breakdown Prevention)  Description: Assess:  -Perform Gucci assessment every shift  -Clean and moisturize skin every day  -Inspect skin when repositioning, toileting, and assisting with ADLS  -Assess under medical devices   -Assess extremities for adequate circulation and sensation     Bed Management:  -Have minimal linens on bed & keep smooth, unwrinkled  -Change linens as needed when moist or perspiring    Toileting:  -Offer bedside commode  -Assess for incontinence every 4 hours  -Use incontinent care products after each incontinent episode    -Encourage activity and walks on unit  -Encourage or provide ROM exercises   -Turn and reposition patient every 2 Hours  -Use appropriate equipment to lift or move patient in bed    Skin Care:  -Avoid use of baby powder, tape, friction and shearing, hot water or constrictive clothing  -Relieve pressure over bony prominences     Problem: Prexisting or High Potential for Compromised Skin Integrity  Goal: Skin integrity is maintained or improved  Description: INTERVENTIONS:  - Identify patients at risk for skin breakdown  - Assess and monitor skin integrity  - Assess and monitor nutrition and hydration status  - Monitor labs   - Assess for incontinence   - Turn and reposition patient  - Assist with mobility/ambulation  - Relieve pressure over bony prominences  - Avoid friction and shearing  - Provide appropriate hygiene as needed including keeping skin clean and dry  - Evaluate need for skin moisturizer/barrier cream  - Collaborate with interdisciplinary team   - Patient/family teaching  - Consider wound care consult   Outcome: Progressing     Problem: Nutrition/Hydration-ADULT  Goal: Nutrient/Hydration intake appropriate for improving, restoring or maintaining nutritional needs  Description: Monitor and assess patient's nutrition/hydration status for malnutrition. Collaborate with interdisciplinary team and initiate plan and interventions as ordered. Monitor patient's weight and dietary intake as ordered or per policy. Utilize nutrition screening tool and intervene as necessary. Determine patient's food preferences and provide high-protein, high-caloric foods as appropriate.      INTERVENTIONS:  - Monitor oral intake, urinary output, labs, and treatment plans  - Assess nutrition and hydration status and recommend course of action  - Evaluate amount of meals eaten  - Assist patient with eating if necessary   - Allow adequate time for meals  - Recommend/ encourage appropriate diets, oral nutritional supplements, and vitamin/mineral supplements  - Order, calculate, and assess calorie counts as needed  - Recommend, monitor, and adjust tube feedings and TPN/PPN based on assessed needs  - Assess need for intravenous fluids  - Provide specific nutrition/hydration education as appropriate  - Include patient/family/caregiver in decisions related to nutrition  Outcome: Progressing

## 2023-08-09 NOTE — PROGRESS NOTES
[address]  Progress Note  Name: Wayne Shen  MRN: [id number]  Unit/Bed#: -01 I Date of Admission: 8/5/2023   Date of Service: 8/9/2023 I Hospital Day: 4    Assessment/Plan   * Cellulitis  Assessment & Plan  · Present on admission with erythema/tenderness to right truncal area  · History of MRSA  · ID following, appreciate recommendations  · Patient was receiving IV vancomycin and cefepime, discontinued by ID on 8/7  · Continue Valtrex and linezolid per ID recommendations  · Wound culture positive Pseudomonas aeruginosa   · Continue local wound care  · Continue pain control as ordered    Shingles  Assessment & Plan  · Patient was on famciclovir prior to admission with no significant improvement  · As of today, she continues to complain of pain right lower back  · ID following, appreciate recommendations  · Continue Valtrex  · Continue gabapentin  · Continue isolation precautions    History of Clostridioides difficile infection  Assessment & Plan  · Continue oral vancomycin 125 mg PO BID for prophylaxis      Depression with anxiety  Assessment & Plan  · Currently without signs of depression, pleasant and talkative   · Continue Cymbalta    Chronic atrial fibrillation (HCC)  Assessment & Plan  · Currently rate controlled    · INR today 1.96  · Coumadin 5 mg today  · INR in a.m.    BMI 70 and over, adult ([address])  Assessment & Plan  · BMI 77.59  · Counseled on diet and lifestyle changes, exercise           VTE Pharmacologic Prophylaxis:   Pharmacologic: Warfarin (Coumadin)  Mechanical VTE Prophylaxis in Place: Yes    Patient Centered Rounds: I have performed bedside rounds with nursing staff today. Discussions with Specialists or Other Care Team Provider: ID, nursing, case management    Education and Discussions with Family / Patient: Treatment plan discussed with patient who understands the plan as its been explained and is agreeable to the plan as stated.   All questions answered to satisfaction. Declines call to family    Time Spent for Care: 45 minutes. More than 50% of total time spent on counseling and coordination of care as described above. Current Length of Stay: 4 day(s)    Current Patient Status: Inpatient   Certification Statement: The patient will continue to require additional inpatient hospital stay due to Trending fever, trending WBCs, trending procalcitonin, continues on antivirals, ID following    Discharge Plan: Plan is for patient to return home with a when medically stable for discharge. Per discussion with ID today, tentative discharge home next 24-48 hours pending a.m. labs and continued improvement in her shingles. Patient states that she has already made her caretakers aware of this. Code Status: Level 1 - Full Code      Subjective:   Pleasant was sleeping on my arrival to the room. She denies pain or discomfort currently. Readily verbalize his needs. Objective:     Vitals:   Temp (24hrs), Av.6 °F (37 °C), Min:97.5 °F (36.4 °C), Max:99.6 °F (37.6 °C)    Temp:  [97.5 °F (36.4 °C)-99.6 °F (37.6 °C)] 97.5 °F (36.4 °C)  HR:  [71-80] 80  Resp:  [17-19] 18  BP: ()/(38-66) 118/66  SpO2:  [91 %-97 %] 91 %  Body mass index is 77.59 kg/m². Input and Output Summary (last 24 hours): Intake/Output Summary (Last 24 hours) at 2023 1626  Last data filed at 2023 1300  Gross per 24 hour   Intake 360 ml   Output 400 ml   Net -40 ml       Physical Exam:     Physical Exam  Vitals and nursing note reviewed. Constitutional:       General: She is not in acute distress. Appearance: She is obese. She is not ill-appearing. HENT:      Head: Normocephalic and atraumatic. Nose: Nose normal.      Mouth/Throat:      Mouth: Mucous membranes are moist.   Cardiovascular:      Rate and Rhythm: Normal rate and regular rhythm. Pulses: Normal pulses. Heart sounds: Normal heart sounds.    Pulmonary:      Effort: Pulmonary effort is normal. No respiratory distress. Breath sounds: No wheezing or rales. Comments: Decreased breath sounds bilateral bases, no cough or shortness of breath  Abdominal:      General: Bowel sounds are normal. There is no distension. Palpations: Abdomen is soft. Tenderness: There is no abdominal tenderness. There is no guarding. Musculoskeletal:         General: Normal range of motion. Skin:     General: Skin is warm and dry. Capillary Refill: Capillary refill takes less than 2 seconds. Findings: Erythema present. Comments: Erythema noted on back; no open areas and the abdominal fold, mild erythema noted   Neurological:      General: No focal deficit present. Mental Status: She is alert and oriented to person, place, and time. Mental status is at baseline. Psychiatric:         Mood and Affect: Mood normal.         Behavior: Behavior normal.         Thought Content: Thought content normal.         Judgment: Judgment normal.         Additional Data:     Labs:    Results from last 7 days   Lab Units 08/09/23  0447   WBC Thousand/uL 13.06*   HEMOGLOBIN g/dL 10.0*   HEMATOCRIT % 34.1*   PLATELETS Thousands/uL 406*   NEUTROS PCT % 84*   LYMPHS PCT % 6*   MONOS PCT % 6   EOS PCT % 3     Results from last 7 days   Lab Units 08/09/23  0447   POTASSIUM mmol/L 4.3   CHLORIDE mmol/L 96   CO2 mmol/L 30   BUN mg/dL 29*   CREATININE mg/dL 1.37*   CALCIUM mg/dL 8.5   ALK PHOS U/L 106*   ALT U/L 13   AST U/L 19     Results from last 7 days   Lab Units 08/09/23  0447   INR  1.96*       * I Have Reviewed All Lab Data Listed Above. * Additional Pertinent Lab Tests Reviewed:  [address] [place] [address] Admission Reviewed    Imaging:    Imaging Reports Reviewed Today Include:   Imaging Personally Reviewed by Myself Includes:      Recent Cultures (last 7 days):     Results from last 7 days   Lab Units 08/06/23  1832 08/06/23  1821 08/05/23  1732 08/05/23  1721   BLOOD CULTURE  No Growth at 50 hrs. No Growth at 48 hrs. --  No Growth at 72 hrs. No Growth at 72 hrs. GRAM STAIN RESULT   --   --  No Polys or Bacteria seen  --    WOUND CULTURE   --   --  Few Colonies of Pseudomonas aeruginosa*  1+ Growth of Staphylococcus aureus*  1+ Growth of  --        Last 24 Hours Medication List:   Current Facility-Administered Medications   Medication Dose Route Frequency Provider Last Rate   • acetaminophen  650 mg Oral Q6H PRN Chaya Kovacs MD     • allopurinol  100 mg Oral Daily Chaya Kovacs MD     • DULoxetine  30 mg Oral Daily Chaya Kovacs MD     • furosemide  40 mg Oral Daily Chaya Kovacs MD     • gabapentin  100 mg Oral TID Chaya Kovacs MD     • levothyroxine  125 mcg Oral Early Morning Chaya Kovacs MD     • linezolid  300 mg Oral Q12H [address] Wilian Zapata PA-C     • ondansetron  4 mg Intravenous Q6H PRN Chaya Kovacs MD     • oxyCODONE  10 mg Oral Q4H PRN ORACIO Last     • oxyCODONE  5 mg Oral Q4H PRN ORACIO Last     • triamcinolone   Topical BID ORACIO Last     • valACYclovir  1,000 mg Oral Q8H [address] Wilian Zapata PA-C     • vancomycin oral (capsules or solution)  125 mg Oral Q12H [address] Wilian Zapata PA-C          Today, Patient Was Seen By: ORACIO Aguila    ** Please Note: Dictation voice to text software may have been used in the creation of this document.  **

## 2023-08-09 NOTE — CASE MANAGEMENT
Case Management Discharge Planning Note    Patient name Keli Mccormick  Location [address] [facility] [place] 216/-01 MRN [id number]  : 1950 Date 2023       Current Admission Date: 2023  Current Admission Diagnosis:Cellulitis   Patient Active Problem List    Diagnosis Date Noted   • Shingles 2023   • Cellulitis 2023   • Elevated lactic acid level 2023   • BMI 70 and over, adult ([address]) 2022   • Chronic atrial fibrillation ([address]) 2022   • History of Clostridioides difficile infection 2022   • Lymphedema of extremity 2021   • Other specified hypothyroidism 10/03/2020   • Mild episode of recurrent major depressive disorder ([address]) 10/03/2020   • Idiopathic chronic 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 10/03/2020   • Primary osteoarthritis involving multiple joints 10/03/2020   • Chronic diastolic congestive heart failure ([address]) 10/03/2020   • Panniculitis 10/03/2020   • Gastroesophageal reflux disease without esophagitis 10/03/2020   • Hx MRSA infection 2020   • Functional gait abnormality 2019   • Impaired mobility 2019   • Osteoarthritis of glenohumeral joint 2019   • Left ovarian cyst 2017   • Depression with anxiety 07/15/2017   • Anemia 2016   • Ambulatory dysfunction 2016   • Acute kidney injury superimposed on CKD ([address]) 2016   • Adjustment disorder 2013   • Irritable bowel syndrome 2012   • Left atrial enlargement 2012   • Left ventricular hypertrophy 2012   • Carpal tunnel syndrome 2012   • Gout 2012   • Hyperlipidemia 2012   • Symptomatic menopausal or female climacteric states 2012      LOS (days): 4  Geometric Mean LOS (GMLOS) (days): 3.20  Days to GMLOS:-0.8     OBJECTIVE:  Risk of Unplanned Readmission Score: 25.56         Current admission status: Inpatient   Preferred Pharmacy:   [id number] [address]  [address] [facility] [place] [address]  Phone: [phone] Fax: [phone] [facility], [address] [place] [address]  Phone: [phone] Fax: [phone]    Primary Care Provider: Shawnee Powell MD    Primary Insurance: MEDICARE  Secondary Insurance: AARP    DISCHARGE DETAILS:    Treatment Team Recommendation: Home with [address]  Discharge Destination Plan[de-identified] Home with [address] at Discharge : BLS Ambulance      Additional Comments: Pt for potential discharge home tomorrow per SLIM. CM arranged for BLS transport tomorow at 1500 in the event she is stable. CM to follow.

## 2023-08-10 VITALS
DIASTOLIC BLOOD PRESSURE: 59 MMHG | HEIGHT: 63 IN | BODY MASS INDEX: 51.91 KG/M2 | OXYGEN SATURATION: 95 % | RESPIRATION RATE: 20 BRPM | WEIGHT: 293 LBS | SYSTOLIC BLOOD PRESSURE: 112 MMHG | HEART RATE: 73 BPM | TEMPERATURE: 96.3 F

## 2023-08-10 LAB
ANION GAP SERPL CALCULATED.3IONS-SCNC: 4 MMOL/L
BASOPHILS # BLD AUTO: 0.04 THOUSANDS/ÂΜL (ref 0–0.1)
BASOPHILS NFR BLD AUTO: 0 % (ref 0–1)
BUN SERPL-MCNC: 26 MG/DL (ref 5–25)
CALCIUM SERPL-MCNC: 8.7 MG/DL (ref 8.4–10.2)
CHLORIDE SERPL-SCNC: 96 MMOL/L (ref 96–108)
CO2 SERPL-SCNC: 31 MMOL/L (ref 21–32)
CREAT SERPL-MCNC: 1.16 MG/DL (ref 0.6–1.3)
EOSINOPHIL # BLD AUTO: 0.37 THOUSAND/ÂΜL (ref 0–0.61)
EOSINOPHIL NFR BLD AUTO: 3 % (ref 0–6)
ERYTHROCYTE [DISTWIDTH] IN BLOOD BY AUTOMATED COUNT: 17.2 % (ref 11.6–15.1)
GFR SERPL CREATININE-BSD FRML MDRD: 47 ML/MIN/1.73SQ M
GLUCOSE SERPL-MCNC: 94 MG/DL (ref 65–140)
HCT VFR BLD AUTO: 34.4 % (ref 34.8–46.1)
HGB BLD-MCNC: 10 G/DL (ref 11.5–15.4)
IMM GRANULOCYTES # BLD AUTO: 0.09 THOUSAND/UL (ref 0–0.2)
IMM GRANULOCYTES NFR BLD AUTO: 1 % (ref 0–2)
LYMPHOCYTES # BLD AUTO: 0.88 THOUSANDS/ÂΜL (ref 0.6–4.47)
LYMPHOCYTES NFR BLD AUTO: 8 % (ref 14–44)
MCH RBC QN AUTO: 24.6 PG (ref 26.8–34.3)
MCHC RBC AUTO-ENTMCNC: 29.1 G/DL (ref 31.4–37.4)
MCV RBC AUTO: 85 FL (ref 82–98)
MONOCYTES # BLD AUTO: 0.73 THOUSAND/ÂΜL (ref 0.17–1.22)
MONOCYTES NFR BLD AUTO: 6 % (ref 4–12)
NEUTROPHILS # BLD AUTO: 9.59 THOUSANDS/ÂΜL (ref 1.85–7.62)
NEUTS SEG NFR BLD AUTO: 82 % (ref 43–75)
NRBC BLD AUTO-RTO: 0 /100 WBCS
PLATELET # BLD AUTO: 402 THOUSANDS/UL (ref 149–390)
PMV BLD AUTO: 8.3 FL (ref 8.9–12.7)
POTASSIUM SERPL-SCNC: 4.3 MMOL/L (ref 3.5–5.3)
RBC # BLD AUTO: 4.06 MILLION/UL (ref 3.81–5.12)
SODIUM SERPL-SCNC: 131 MMOL/L (ref 135–147)
WBC # BLD AUTO: 11.7 THOUSAND/UL (ref 4.31–10.16)

## 2023-08-10 PROCEDURE — 80048 BASIC METABOLIC PNL TOTAL CA: CPT | Performed by: INTERNAL MEDICINE

## 2023-08-10 PROCEDURE — 99232 SBSQ HOSP IP/OBS MODERATE 35: CPT | Performed by: INTERNAL MEDICINE

## 2023-08-10 PROCEDURE — 99239 HOSP IP/OBS DSCHRG MGMT >30: CPT

## 2023-08-10 PROCEDURE — 85025 COMPLETE CBC W/AUTO DIFF WBC: CPT | Performed by: INTERNAL MEDICINE

## 2023-08-10 RX ORDER — VALACYCLOVIR HYDROCHLORIDE 1 G/1
1000 TABLET, FILM COATED ORAL EVERY 8 HOURS SCHEDULED
Qty: 11 TABLET | Refills: 0 | Status: ON HOLD | OUTPATIENT
Start: 2023-08-10 | End: 2023-08-14

## 2023-08-10 RX ORDER — LINEZOLID 600 MG/1
600 TABLET, FILM COATED ORAL EVERY 12 HOURS SCHEDULED
Qty: 13 TABLET | Refills: 0 | Status: SHIPPED | OUTPATIENT
Start: 2023-08-10 | End: 2023-08-17

## 2023-08-10 RX ORDER — VANCOMYCIN HYDROCHLORIDE 125 MG/1
125 CAPSULE ORAL EVERY 12 HOURS SCHEDULED
Qty: 18 CAPSULE | Refills: 0 | Status: SHIPPED | OUTPATIENT
Start: 2023-08-10 | End: 2023-08-19

## 2023-08-10 RX ORDER — GABAPENTIN 100 MG/1
100 CAPSULE ORAL 3 TIMES DAILY
Qty: 90 CAPSULE | Refills: 0 | Status: ON HOLD | OUTPATIENT
Start: 2023-08-10 | End: 2023-09-09

## 2023-08-10 RX ADMIN — VALACYCLOVIR HYDROCHLORIDE 1000 MG: 500 TABLET, FILM COATED ORAL at 13:57

## 2023-08-10 RX ADMIN — TRIAMCINOLONE ACETONIDE: 1 CREAM TOPICAL at 08:54

## 2023-08-10 RX ADMIN — DULOXETINE HYDROCHLORIDE 30 MG: 30 CAPSULE, DELAYED RELEASE ORAL at 08:53

## 2023-08-10 RX ADMIN — GABAPENTIN 100 MG: 100 CAPSULE ORAL at 08:54

## 2023-08-10 RX ADMIN — VALACYCLOVIR HYDROCHLORIDE 1000 MG: 500 TABLET, FILM COATED ORAL at 06:18

## 2023-08-10 RX ADMIN — LEVOTHYROXINE SODIUM 125 MCG: 25 TABLET ORAL at 06:18

## 2023-08-10 RX ADMIN — ALLOPURINOL 100 MG: 100 TABLET ORAL at 08:54

## 2023-08-10 RX ADMIN — LINEZOLID 300 MG: 600 TABLET, FILM COATED ORAL at 08:53

## 2023-08-10 RX ADMIN — VANCOMYCIN HYDROCHLORIDE 125 MG: 125 CAPSULE ORAL at 08:53

## 2023-08-10 RX ADMIN — FUROSEMIDE 40 MG: 40 TABLET ORAL at 08:54

## 2023-08-10 NOTE — DISCHARGE SUMMARY
[address]  Discharge- Kelly Khan 1950, 68 y.o. female MRN: [id number]  Unit/Bed#: -01 Encounter: [id number]  Primary Care Provider: Evans Romero MD   Date and time admitted to hospital: 8/5/2023 12:05 PM    * Cellulitis  Assessment & Plan  · Present on admission with erythema/tenderness to right truncal area  · History of MRSA  · ID following, appreciate recommendations  · Patient was receiving IV vancomycin and cefepime, discontinued by ID on 8/7  · Continue Valtrex and linezolid on discharge per ID orders      Shingles  Assessment & Plan  · Patient was on famciclovir prior to admission with no significant improvement  · ID following, appreciate recommendations  · Continue Valtrex and Zyvox regimen per ID on discharge  · Continue gabapentin on discharge      History of Clostridioides difficile infection  Assessment & Plan  · Continue oral vancomycin 125 mg PO BID for prophylaxis as ordered by ID on discharge      Depression with anxiety  Assessment & Plan  · Currently without signs of depression, pleasant and talkative   · Continue home Cymbalta on discharge    Chronic atrial fibrillation (HCC)  Assessment & Plan  · Currently rate controlled    · Continue home regimen on discharge  · Further monitoring of INR to be determined by PCP in outpatient setting    BMI 70 and over, adult ([address])  Assessment & Plan  · BMI 77.59  · Counseled on diet and lifestyle changes, exercise        Discharging Physician / Practitioner: ORACIO Blakely  PCP: Evans Romero MD  Admission Date:   Admission Orders (From admission, onward)     Ordered        08/05/23 [address]  Once                      Discharge Date: 08/10/23    Medical Problems     Resolved Problems  Date Reviewed: 8/10/2023   None         Consultations During Hospital Stay:  · ID    Procedures Performed:   · None    Significant Findings / Test Results:   · 8/5 wound culture grew Pseudomonas aeruginosa  · 8/5 blood cultures x2 no growth  · 8/6 blood cultures x 2 no growth    Incidental Findings:   · None    Test Results Pending at Discharge (will require follow up): · None     Outpatient Tests Requested:  · To be determined by your primary care physician    Complications: None    Reason for Admission: Cellulitis    Hospital Course:     Memory Lefort is a 68 y.o. female patient who originally presented to the hospital on 8/5/2023 due to pain from pre hospital shingles infection. Was noted to have erythema on her right truncal area on arrival with increased drainage. Patient noted that she had the shingles rash about a week prior to arrival and was on famciclovir in outpatient setting with no improvement. Patient was initiated on vancomycin and cefepime on arrival felt possibly had superimposed bacterial infection. ID was consulted. IV vancomycin and cefepime discontinued on 8/7 by ID. Patient was initiated on Valtrex and linezolid at that time, will continue remainder of course per ID on discharge today. On exam today, patient's rash is improving, remains afebrile with mild leukocytosis. Discussed with ID, repeat at discharge today. Will continue on gabapentin that was initiated on arrival for nerve pain as patient reports it has helped her pain significantly. Patient also history history of C. difficile and will be discharged on vancomycin prophylaxis as per ID recommendations. Patient will be discharged home today with home health care. She will follow-up with her PCP within a week of discharge. Please see above list of diagnoses and related plan for additional information. Condition at Discharge: good     Discharge Day Visit / Exam:     Subjective: Patient states she is feeling better today, less pain in her back unless she moves in bed.      Vitals: Blood Pressure: 112/59 (08/10/23 0748)  Pulse: 73 (08/10/23 0748)  Temperature: (!) 96.3 °F (35.7 °C) (08/10/23 0700)  Temp Source: Oral (08/09/23 2214)  Respirations: 20 (08/10/23 0700)  Height: 5' 3" (160 cm) (08/05/23 1217)  Weight - Scale: (!) 200 kg (441 lb 9.3 oz) (08/10/23 0600)  SpO2: 95 % (08/10/23 0748)  Exam:   Physical Exam  Vitals and nursing note reviewed. Constitutional:       General: She is not in acute distress. Appearance: She is well-developed. She is obese. She is not ill-appearing. HENT:      Head: Normocephalic and atraumatic. Nose: Nose normal.      Mouth/Throat:      Mouth: Mucous membranes are moist.   Cardiovascular:      Rate and Rhythm: Normal rate and regular rhythm. Pulses: Normal pulses. Pulmonary:      Effort: Pulmonary effort is normal. No respiratory distress. Breath sounds: Normal breath sounds. No wheezing or rales. Abdominal:      General: Bowel sounds are normal. There is no distension. Palpations: Abdomen is soft. Tenderness: There is no abdominal tenderness. There is no guarding. Musculoskeletal:         General: No swelling. Normal range of motion. Skin:     General: Skin is warm and dry. Capillary Refill: Capillary refill takes less than 2 seconds. Comments: Rash on back much improved   Neurological:      General: No focal deficit present. Mental Status: She is alert and oriented to person, place, and time. Mental status is at baseline. Comments: Patient bedbound at baseline   Psychiatric:         Mood and Affect: Mood normal.         Behavior: Behavior normal.         Thought Content: Thought content normal.         Judgment: Judgment normal.         Discussion with Family: Course of hospitalization including testing and treatments discussed with patient who understands all that has been explained. She is aware of the new medications  being prescribed on discharge including Zyvox, Valtrex, linezolid per ID recommendations. She understands she needs to complete the entire course of these medications.   She is also aware that we will add gabapentin on discharge for her nerve pain as she requested. All questions answered to her satisfaction. Discharge instructions/Information to patient and family:   See after visit summary for information provided to patient and family. Provisions for Follow-Up Care:  See after visit summary for information related to follow-up care and any pertinent home health orders. Disposition:     Home with [facility] Services (Reminder: Complete face to face encounter)    For Discharges to [facility]:   · Not Applicable to this Patient - Not Applicable to this Patient    Planned Readmission: No     Discharge Statement:  I spent 42 minutes discharging the patient. This time was spent on the day of discharge. I had direct contact with the patient on the day of discharge. Greater than 50% of the total time was spent examining patient, answering all patient questions, arranging and discussing plan of care with patient as well as directly providing post-discharge instructions. Additional time then spent on discharge activities. Discharge Medications:  See after visit summary for reconciled discharge medications provided to patient and family.       ** Please Note: This note has been constructed using a voice recognition system **

## 2023-08-10 NOTE — PROGRESS NOTES
Progress Note - Infectious Disease   Josephine Garcia 68 y.o. female MRN: [id number]  Unit/Bed#: -01 Encounter: [id number]      Impression/Plan:  1.  Developing sepsis, over admission. Grace Shrestha noted with leukocytosis on presentation, brief episode of tachycardia 8/6 and later low-grade fevers and further rise in white count.  Suspect that this is related to #2.  This may have been an ongoing viral process but other consideration is for secondary cellulitis.  Patient unfortunately may not achieve therapeutic drug levels in the setting of #5. Suspect wound culture represents colonization.  Clinical parameters and exam improving. Antibiotics/antiviral as below  Continue to trend fever curve/vitals  Repeat CBC/chemistry tomorrow, if admitted  Additional supportive care/discharge per primary    2.  Rash with associated pain.  Based on history again would question if this may be shingles and patient had not been on antiviral since admission.  Other consideration given pain is for possible streptococcal infection.  Antibiotic options however affected by #5 and #6.  Wound cultures likely represent colonization.  Blood cultures remain so far without growth. Overall improved on exam.  Continue linezolid 300 mg every 12 hours for 10 days through 8/16  Continue Valtrex for total 7 days of therapy through 8/13  Continue oral vancomycin prophylaxis through 8/19  Continue to trend fever curve/WBC  Monitor exam while admitted  Continue local wound care otherwise  Additional care/discharge per primary    3.  History of panniculitis.  Current changes to the patient's pannus appear consistent with lymphedema.  No concerning cellulitis at the sites. Continue antibiotics otherwise as above    4.  History of C. Difficile.  No active diarrhea currently. Continue prophylaxis as above while patient is on systemic antibiotic    5.  Morbid obesity.  BMI noted to be 77.  This does affect antibiotic dosing, delivery of efficacy. Will continue with linezolid as above and duration of therapy noted. 6.  Antibiotic allergies.  Noted with severe allergies to penicillins and lower generation cephalosporins.  Has tolerated cefepime. Unfortunately limited antibiotic options as above. Regimen and duration as above.     7.  Reduced eGFR. Patient noted to have reduced EGFR on labs which unfortunately does affect antibiotic dosing in terms of linezolid. Adjusted dosing as above. Will further adjust as needed. Above plan discussed in detail with the patient and primary service. ID consult service will continue to follow while admitted. Antibiotics:  6 abx   D4 valtrex   D4 linezolid   D6 PO vanco     24 Hour events:  Yesterday and overnight notes reviewed and no acute events noted    Subjective:  Patient currently denies having any nausea, vomiting, chest pain or shortness of breath. Tolerating current antibiotic. We reviewed duration of therapy including oral vancomycin. Objective:  Vitals:  Temp:  [96.3 °F (35.7 °C)-97.5 °F (36.4 °C)] 96.3 °F (35.7 °C)  HR:  [72-80] 73  Resp:  [18-20] 20  BP: (102-118)/(54-66) 112/59  SpO2:  [91 %-95 %] 95 %  Temp (24hrs), Av.1 °F (36.2 °C), Min:96.3 °F (35.7 °C), Max:97.5 °F (36.4 °C)  Current: Temperature: (!) 96.3 °F (35.7 °C)    Physical Exam:   General Appearance:  Alert, interactive, nontoxic, no acute distress. Throat: Oropharynx moist without lesions. Lungs:    Decreased breath sounds throughout on nasal cannula. Heart:  RRR; no murmur, rub or gallop noted   Abdomen:   Soft, non-tender, non-distended, positive bowel sounds. Obese abdomen. Extremities: No clubbing, cyanosis or edema; changes of lymphedema and obesity in the legs bilaterally   Skin: No new rashes or lesions. No new draining wounds noted. Previous changes of erythema to the back have improved.        Labs, Imaging, & Other studies:   All pertinent labs and imaging studies in PACS were personally reviewed as below. Results from last 7 days   Lab Units 08/10/23  0619 08/09/23  0447 08/08/23  0500   WBC Thousand/uL 11.70* 13.06* 15.26*   HEMOGLOBIN g/dL 10.0* 10.0* 10.6*   PLATELETS Thousands/uL 402* 406* 399*     Results from last 7 days   Lab Units 08/10/23  0619 08/09/23  0447   POTASSIUM mmol/L 4.3 4.3   CHLORIDE mmol/L 96 96   CO2 mmol/L 31 30   BUN mg/dL 26* 29*   CREATININE mg/dL 1.16 1.37*   EGFR ml/min/1.73sq m 47 38   CALCIUM mg/dL 8.7 8.5   AST U/L  --  19   ALT U/L  --  13   ALK PHOS U/L  --  106*     Results from last 7 days   Lab Units 08/06/23  1832 08/06/23  1821 08/05/23  1732 08/05/23  1721   BLOOD CULTURE  No Growth at 72 hrs. No Growth at 72 hrs.  --  No Growth After 4 Days. No Growth After 4 Days. GRAM STAIN RESULT   --   --  No Polys or Bacteria seen  --    WOUND CULTURE   --   --  Few Colonies of Pseudomonas aeruginosa*  1+ Growth of Staphylococcus aureus*  1+ Growth of  --    MRSA CULTURE ONLY   --   --   --  No Methicillin Resistant Staphlyococcus aureus (MRSA) isolated       Lab interpretation/comments: White blood cell count down trended to 11. Creatinine 1.1. Imaging interpretation/comments: No new images    Culture data: Blood cultures remain without growth.

## 2023-08-10 NOTE — CASE MANAGEMENT
Case Management Discharge Planning Note    Patient name Kayla Norwood  Location [address] [facility] 216/-01 MRN [id number]  : 1950 Date 8/10/2023       Current Admission Date: 2023  Current Admission Diagnosis:Cellulitis   Patient Active Problem List    Diagnosis Date Noted   • Shingles 2023   • Cellulitis 2023   • Elevated lactic acid level 2023   • BMI 70 and over, adult ([address]) 2022   • Chronic atrial fibrillation ([address]) 2022   • History of Clostridioides difficile infection 2022   • Lymphedema of extremity 2021   • Other specified hypothyroidism 10/03/2020   • Mild episode of recurrent major depressive disorder ([address]) 10/03/2020   • Idiopathic chronic 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 10/03/2020   • Primary osteoarthritis involving multiple joints 10/03/2020   • Chronic diastolic congestive heart failure ([address]) 10/03/2020   • Panniculitis 10/03/2020   • Gastroesophageal reflux disease without esophagitis 10/03/2020   • Hx MRSA infection 2020   • Functional gait abnormality 2019   • Impaired mobility 2019   • Osteoarthritis of glenohumeral joint 2019   • Left ovarian cyst 2017   • Depression with anxiety 07/15/2017   • Anemia 2016   • Ambulatory dysfunction 2016   • Acute kidney injury superimposed on CKD ([address]) 2016   • Adjustment disorder 2013   • Irritable bowel syndrome 2012   • Left atrial enlargement 2012   • Left ventricular hypertrophy 2012   • Carpal tunnel syndrome 2012   • Gout 2012   • Hyperlipidemia 2012   • Symptomatic menopausal or female climacteric states 2012      LOS (days): 5  Geometric Mean LOS (GMLOS) (days): 3.20  Days to GMLOS:-1.6     OBJECTIVE:  Risk of Unplanned Readmission Score: 24.4         Current admission status: Inpatient   Preferred Pharmacy:   [address] [address]  [address] [facility] [place] [address]  Phone: [phone] Fax: [phone]    [facility], [address] [place] [address]  Phone: [phone] Fax: [phone]    Primary Care Provider: Williams Munguia MD    Primary Insurance: MEDICARE  Secondary Insurance: AARP    DISCHARGE DETAILS:    Discharge planning discussed with[de-identified] Patient  Freedom of Choice: Yes       Would you like to participate in our [address] service program?  : No - Declined    Treatment Team Recommendation: Home with [address] [facility]  Discharge Destination Plan[de-identified] [facility]        Transported by [facility] and Unit #): [id number]  ETA of Transport (Date): 08/10/23  ETA of Transport (Time): 1500           IMM Given (Date):: 08/10/23  IMM Given to[de-identified] Patient     Additional Comments: Pt stable for discharge today per Ashley from [address] [place] [address]. Pt aware and in agreement with discharge home. Transport scheduled with [facility] at 1500. Pt and CG aware. CG will meet pt at home when she arrives. [facility] will resume on dischagre. Pt nurse Yessi Wylie aware of discharge and transport time.

## 2023-08-10 NOTE — PLAN OF CARE
Problem: Potential for Falls  Goal: Patient will remain free of falls  Description: INTERVENTIONS:  - Educate patient/family on patient safety including physical limitations  - Instruct patient to call for assistance with activity   - Consult OT/PT to assist with strengthening/mobility   - Keep Call bell within reach  - Keep bed low and locked with side rails adjusted as appropriate  - Keep care items and personal belongings within reach  - Initiate and maintain comfort rounds  - Make Fall Risk Sign visible to staff  - Offer Toileting every  Hours, in advance of need  - Initiate/Maintain alarm  - Obtain necessary fall risk management equipment:   - Apply yellow socks and bracelet for high fall risk patients  - Consider moving patient to room near nurses station  Outcome: Progressing     Problem: MOBILITY - ADULT  Goal: Maintain or return to baseline ADL function  Description: INTERVENTIONS:  -  Assess patient's ability to carry out ADLs; assess patient's baseline for ADL function and identify physical deficits which impact ability to perform ADLs (bathing, care of mouth/teeth, toileting, grooming, dressing, etc.)  - Assess/evaluate cause of self-care deficits   - Assess range of motion  - Assess patient's mobility; develop plan if impaired  - Assess patient's need for assistive devices and provide as appropriate  - Encourage maximum independence but intervene and supervise when necessary  - Involve family in performance of ADLs  - Assess for home care needs following discharge   - Consider OT consult to assist with ADL evaluation and planning for discharge  - Provide patient education as appropriate  Outcome: Progressing  Goal: Maintains/Returns to pre admission functional level  Description: INTERVENTIONS:  - Perform BMAT or MOVE assessment daily.   - Set and communicate daily mobility goal to care team and patient/family/caregiver.    - Collaborate with rehabilitation services on mobility goals if consulted  - Perform Range of Motion  times a day. - Reposition patient every  hours. - Dangle patient  times a day  - Stand patient  times a day  - Ambulate patient  times a day  - Out of bed to chair  times a day   - Out of bed for meals  times a day  - Out of bed for toileting  - Record patient progress and toleration of activity level   Outcome: Progressing     Problem: DISCHARGE PLANNING  Goal: Discharge to home or other facility with appropriate resources  Description: INTERVENTIONS:  - Identify barriers to discharge w/patient and caregiver  - Arrange for needed discharge resources and transportation as appropriate  - Identify discharge learning needs (meds, wound care, etc.)  - Arrange for interpretive services to assist at discharge as needed  - Refer to Case Management Department for coordinating discharge planning if the patient needs post-hospital services based on physician/advanced practitioner order or complex needs related to functional status, cognitive ability, or social support system  Outcome: Progressing     Problem: Knowledge Deficit  Goal: Patient/family/caregiver demonstrates understanding of disease process, treatment plan, medications, and discharge instructions  Description: Complete learning assessment and assess knowledge base.   Interventions:  - Provide teaching at level of understanding  - Provide teaching via preferred learning methods  Outcome: Progressing     Problem: SKIN/TISSUE INTEGRITY - ADULT  Goal: Skin Integrity remains intact(Skin Breakdown Prevention)  Description: Assess:  -Perform Gucci assessment every   -Clean and moisturize skin every   -Inspect skin when repositioning, toileting, and assisting with ADLS  -Assess under medical devices such as  every   -Assess extremities for adequate circulation and sensation     Bed Management:  -Have minimal linens on bed & keep smooth, unwrinkled  -Change linens as needed when moist or perspiring  -Avoid sitting or lying in one position for more than  hours while in bed  -Keep HOB at degrees     Toileting:  -Offer bedside commode  -Assess for incontinence every   -Use incontinent care products after each incontinent episode such as     Activity:  -Mobilize patient  times a day  -Encourage activity and walks on unit  -Encourage or provide ROM exercises   -Turn and reposition patient every  Hours  -Use appropriate equipment to lift or move patient in bed  -Instruct/ Assist with weight shifting every  when out of bed in chair  -Consider limitation of chair time  hour intervals    Skin Care:  -Avoid use of baby powder, tape, friction and shearing, hot water or constrictive clothing  -Relieve pressure over bony prominences using   -Do not massage red bony areas    Next Steps:  -Teach patient strategies to minimize risks such as    -Consider consults to  interdisciplinary teams such as  Outcome: Progressing     Problem: Prexisting or High Potential for Compromised Skin Integrity  Goal: Skin integrity is maintained or improved  Description: INTERVENTIONS:  - Identify patients at risk for skin breakdown  - Assess and monitor skin integrity  - Assess and monitor nutrition and hydration status  - Monitor labs   - Assess for incontinence   - Turn and reposition patient  - Assist with mobility/ambulation  - Relieve pressure over bony prominences  - Avoid friction and shearing  - Provide appropriate hygiene as needed including keeping skin clean and dry  - Evaluate need for skin moisturizer/barrier cream  - Collaborate with interdisciplinary team   - Patient/family teaching  - Consider wound care consult   Outcome: Progressing     Problem: Nutrition/Hydration-ADULT  Goal: Nutrient/Hydration intake appropriate for improving, restoring or maintaining nutritional needs  Description: Monitor and assess patient's nutrition/hydration status for malnutrition. Collaborate with interdisciplinary team and initiate plan and interventions as ordered.   Monitor patient's weight and dietary intake as ordered or per policy. Utilize nutrition screening tool and intervene as necessary. Determine patient's food preferences and provide high-protein, high-caloric foods as appropriate.      INTERVENTIONS:  - Monitor oral intake, urinary output, labs, and treatment plans  - Assess nutrition and hydration status and recommend course of action  - Evaluate amount of meals eaten  - Assist patient with eating if necessary   - Allow adequate time for meals  - Recommend/ encourage appropriate diets, oral nutritional supplements, and vitamin/mineral supplements  - Order, calculate, and assess calorie counts as needed  - Recommend, monitor, and adjust tube feedings and TPN/PPN based on assessed needs  - Assess need for intravenous fluids  - Provide specific nutrition/hydration education as appropriate  - Include patient/family/caregiver in decisions related to nutrition  Outcome: Progressing

## 2023-08-10 NOTE — PLAN OF CARE
Problem: Prexisting or High Potential for Compromised Skin Integrity  Goal: Skin integrity is maintained or improved  Description: INTERVENTIONS:  - Identify patients at risk for skin breakdown  - Assess and monitor skin integrity  - Assess and monitor nutrition and hydration status  - Monitor labs   - Assess for incontinence   - Turn and reposition patient  - Assist with mobility/ambulation  - Relieve pressure over bony prominences  - Avoid friction and shearing  - Provide appropriate hygiene as needed including keeping skin clean and dry  - Evaluate need for skin moisturizer/barrier cream  - Collaborate with interdisciplinary team   - Patient/family teaching  - Consider wound care consult   Outcome: Progressing   Dressing changes done daily, patient repo q2hr

## 2023-08-10 NOTE — DISCHARGE INSTR - AVS FIRST PAGE
Please follow-up with your primary care physician within a week of discharge  I have made no changes to your prehospital medications. We are adding a course of oral Valtrex 3 times a day, Zyvox 300 mg (half of a 600 mg tablet) every 12 hours through 8/17, and Valtrex 1000 mg every 8 hours, and vancomycin 125 mg every 12 hours. Please complete this regimen as this was recommended by the infectious disease doctors. I have also added some gabapentin 100 mg 3 times a day as you were getting while you were in the hospital for your nerve pain. A prescription for all of these medications has been sent to your pharmacy. Has been a pleasure taking care of you Ottoniel Mei.   I hope you have a great rest your day and happy birthday!!  Ana Paula Fuelling

## 2023-08-10 NOTE — NURSING NOTE
Went over [employer] with patient, all questions answered. [facility] transport picked up patient and will transport to home. Care taker aware and waiting for patient.

## 2023-08-11 LAB
BACTERIA BLD CULT: NORMAL
BACTERIA BLD CULT: NORMAL

## 2023-08-12 LAB
BACTERIA BLD CULT: NORMAL
BACTERIA BLD CULT: NORMAL

## 2023-08-21 ENCOUNTER — HOSPITAL ENCOUNTER (INPATIENT)
Facility: HOSPITAL | Age: 73
LOS: 7 days | Discharge: HOME HEALTH | DRG: 607 | End: 2023-08-28
Attending: INTERNAL MEDICINE | Admitting: HOSPITALIST
Payer: MEDICARE

## 2023-08-21 DIAGNOSIS — I48.20 CHRONIC ATRIAL FIBRILLATION (HCC): Chronic | ICD-10-CM

## 2023-08-21 DIAGNOSIS — Z86.19 HISTORY OF CLOSTRIDIOIDES DIFFICILE INFECTION: Chronic | ICD-10-CM

## 2023-08-21 DIAGNOSIS — L03.319 CELLULITIS OF TRUNK, UNSPECIFIED SITE OF TRUNK: ICD-10-CM

## 2023-08-21 DIAGNOSIS — I50.32 CHRONIC DIASTOLIC CONGESTIVE HEART FAILURE (HCC): Chronic | ICD-10-CM

## 2023-08-21 DIAGNOSIS — M79.3 PANNICULITIS: ICD-10-CM

## 2023-08-21 DIAGNOSIS — L03.311 CELLULITIS OF ABDOMINAL WALL: Primary | ICD-10-CM

## 2023-08-21 DIAGNOSIS — B02.9 HERPES ZOSTER WITHOUT COMPLICATION: ICD-10-CM

## 2023-08-21 DIAGNOSIS — K58.9 IRRITABLE BOWEL SYNDROME: ICD-10-CM

## 2023-08-21 PROBLEM — E66.813 CLASS 3 SEVERE OBESITY DUE TO EXCESS CALORIES WITH SERIOUS COMORBIDITY AND BODY MASS INDEX (BMI) GREATER THAN OR EQUAL TO 70 IN ADULT (HCC): Chronic | Status: ACTIVE | Noted: 2020-10-03

## 2023-08-21 PROBLEM — R26.89 FUNCTIONAL GAIT ABNORMALITY: Status: RESOLVED | Noted: 2019-02-03 | Resolved: 2023-08-21

## 2023-08-21 PROBLEM — R79.89 ELEVATED LACTIC ACID LEVEL: Status: RESOLVED | Noted: 2023-03-22 | Resolved: 2023-08-21

## 2023-08-21 LAB
ALBUMIN SERPL BCP-MCNC: 2.7 G/DL (ref 3.5–5)
ALP SERPL-CCNC: 109 U/L (ref 34–104)
ALT SERPL W P-5'-P-CCNC: 14 U/L (ref 7–52)
ANION GAP SERPL CALCULATED.3IONS-SCNC: 8 MMOL/L
APTT PPP: 52 SECONDS (ref 23–37)
AST SERPL W P-5'-P-CCNC: 26 U/L (ref 13–39)
BASOPHILS # BLD AUTO: 0.04 THOUSANDS/ÂΜL (ref 0–0.1)
BASOPHILS NFR BLD AUTO: 0 % (ref 0–1)
BILIRUB SERPL-MCNC: 0.65 MG/DL (ref 0.2–1)
BUN SERPL-MCNC: 21 MG/DL (ref 5–25)
CALCIUM ALBUM COR SERPL-MCNC: 9.7 MG/DL (ref 8.3–10.1)
CALCIUM SERPL-MCNC: 8.7 MG/DL (ref 8.4–10.2)
CHLORIDE SERPL-SCNC: 97 MMOL/L (ref 96–108)
CO2 SERPL-SCNC: 29 MMOL/L (ref 21–32)
CREAT SERPL-MCNC: 1.12 MG/DL (ref 0.6–1.3)
EOSINOPHIL # BLD AUTO: 0.3 THOUSAND/ÂΜL (ref 0–0.61)
EOSINOPHIL NFR BLD AUTO: 2 % (ref 0–6)
ERYTHROCYTE [DISTWIDTH] IN BLOOD BY AUTOMATED COUNT: 17.7 % (ref 11.6–15.1)
GFR SERPL CREATININE-BSD FRML MDRD: 49 ML/MIN/1.73SQ M
GLUCOSE SERPL-MCNC: 90 MG/DL (ref 65–140)
HCT VFR BLD AUTO: 36.2 % (ref 34.8–46.1)
HGB BLD-MCNC: 10.6 G/DL (ref 11.5–15.4)
IMM GRANULOCYTES # BLD AUTO: 0.04 THOUSAND/UL (ref 0–0.2)
IMM GRANULOCYTES NFR BLD AUTO: 0 % (ref 0–2)
INR PPP: 2.05 (ref 0.84–1.19)
LACTATE SERPL-SCNC: 1.2 MMOL/L (ref 0.5–2)
LYMPHOCYTES # BLD AUTO: 0.99 THOUSANDS/ÂΜL (ref 0.6–4.47)
LYMPHOCYTES NFR BLD AUTO: 7 % (ref 14–44)
MCH RBC QN AUTO: 24.8 PG (ref 26.8–34.3)
MCHC RBC AUTO-ENTMCNC: 29.3 G/DL (ref 31.4–37.4)
MCV RBC AUTO: 85 FL (ref 82–98)
MONOCYTES # BLD AUTO: 0.51 THOUSAND/ÂΜL (ref 0.17–1.22)
MONOCYTES NFR BLD AUTO: 4 % (ref 4–12)
NEUTROPHILS # BLD AUTO: 11.89 THOUSANDS/ÂΜL (ref 1.85–7.62)
NEUTS SEG NFR BLD AUTO: 87 % (ref 43–75)
NRBC BLD AUTO-RTO: 0 /100 WBCS
PLATELET # BLD AUTO: 583 THOUSANDS/UL (ref 149–390)
PMV BLD AUTO: 8.8 FL (ref 8.9–12.7)
POTASSIUM SERPL-SCNC: 4.7 MMOL/L (ref 3.5–5.3)
PROCALCITONIN SERPL-MCNC: 0.14 NG/ML
PROT SERPL-MCNC: 7.9 G/DL (ref 6.4–8.4)
PROTHROMBIN TIME: 23.3 SECONDS (ref 11.6–14.5)
RBC # BLD AUTO: 4.27 MILLION/UL (ref 3.81–5.12)
SODIUM SERPL-SCNC: 134 MMOL/L (ref 135–147)
WBC # BLD AUTO: 13.77 THOUSAND/UL (ref 4.31–10.16)

## 2023-08-21 PROCEDURE — 36415 COLL VENOUS BLD VENIPUNCTURE: CPT | Performed by: INTERNAL MEDICINE

## 2023-08-21 PROCEDURE — 93005 ELECTROCARDIOGRAM TRACING: CPT

## 2023-08-21 PROCEDURE — 99284 EMERGENCY DEPT VISIT MOD MDM: CPT

## 2023-08-21 PROCEDURE — 99285 EMERGENCY DEPT VISIT HI MDM: CPT | Performed by: INTERNAL MEDICINE

## 2023-08-21 PROCEDURE — 87040 BLOOD CULTURE FOR BACTERIA: CPT | Performed by: INTERNAL MEDICINE

## 2023-08-21 PROCEDURE — 85730 THROMBOPLASTIN TIME PARTIAL: CPT | Performed by: INTERNAL MEDICINE

## 2023-08-21 PROCEDURE — 85610 PROTHROMBIN TIME: CPT | Performed by: INTERNAL MEDICINE

## 2023-08-21 PROCEDURE — 85025 COMPLETE CBC W/AUTO DIFF WBC: CPT | Performed by: INTERNAL MEDICINE

## 2023-08-21 PROCEDURE — 80053 COMPREHEN METABOLIC PANEL: CPT | Performed by: INTERNAL MEDICINE

## 2023-08-21 PROCEDURE — 99223 1ST HOSP IP/OBS HIGH 75: CPT | Performed by: PHYSICIAN ASSISTANT

## 2023-08-21 PROCEDURE — 96374 THER/PROPH/DIAG INJ IV PUSH: CPT

## 2023-08-21 PROCEDURE — 83605 ASSAY OF LACTIC ACID: CPT | Performed by: INTERNAL MEDICINE

## 2023-08-21 PROCEDURE — 84145 PROCALCITONIN (PCT): CPT | Performed by: INTERNAL MEDICINE

## 2023-08-21 RX ORDER — WARFARIN SODIUM 2 MG/1
4 TABLET ORAL
Status: DISCONTINUED | OUTPATIENT
Start: 2023-08-22 | End: 2023-08-28 | Stop reason: HOSPADM

## 2023-08-21 RX ORDER — WARFARIN SODIUM 2 MG/1
2 TABLET ORAL
Status: DISCONTINUED | OUTPATIENT
Start: 2023-08-23 | End: 2023-08-28 | Stop reason: HOSPADM

## 2023-08-21 RX ORDER — VANCOMYCIN HYDROCHLORIDE 125 MG/1
125 CAPSULE ORAL EVERY 12 HOURS SCHEDULED
Status: DISCONTINUED | OUTPATIENT
Start: 2023-08-21 | End: 2023-08-28 | Stop reason: HOSPADM

## 2023-08-21 RX ORDER — ACETAMINOPHEN 325 MG/1
650 TABLET ORAL EVERY 4 HOURS PRN
Status: DISCONTINUED | OUTPATIENT
Start: 2023-08-21 | End: 2023-08-28 | Stop reason: HOSPADM

## 2023-08-21 RX ORDER — HYDROMORPHONE HCL/PF 1 MG/ML
0.5 SYRINGE (ML) INJECTION ONCE
Status: COMPLETED | OUTPATIENT
Start: 2023-08-21 | End: 2023-08-21

## 2023-08-21 RX ORDER — CLINDAMYCIN PHOSPHATE 600 MG/50ML
600 INJECTION INTRAVENOUS ONCE
Status: COMPLETED | OUTPATIENT
Start: 2023-08-21 | End: 2023-08-21

## 2023-08-21 RX ORDER — ONDANSETRON 2 MG/ML
4 INJECTION INTRAMUSCULAR; INTRAVENOUS EVERY 6 HOURS PRN
Status: DISCONTINUED | OUTPATIENT
Start: 2023-08-21 | End: 2023-08-28 | Stop reason: HOSPADM

## 2023-08-21 RX ORDER — ALLOPURINOL 100 MG/1
100 TABLET ORAL DAILY
Status: DISCONTINUED | OUTPATIENT
Start: 2023-08-22 | End: 2023-08-28 | Stop reason: HOSPADM

## 2023-08-21 RX ORDER — FUROSEMIDE 10 MG/ML
40 INJECTION INTRAMUSCULAR; INTRAVENOUS
Status: DISCONTINUED | OUTPATIENT
Start: 2023-08-22 | End: 2023-08-26

## 2023-08-21 RX ORDER — GABAPENTIN 100 MG/1
100 CAPSULE ORAL 3 TIMES DAILY
Status: DISCONTINUED | OUTPATIENT
Start: 2023-08-21 | End: 2023-08-28 | Stop reason: HOSPADM

## 2023-08-21 RX ORDER — DULOXETIN HYDROCHLORIDE 20 MG/1
20 CAPSULE, DELAYED RELEASE ORAL DAILY
Status: DISCONTINUED | OUTPATIENT
Start: 2023-08-22 | End: 2023-08-28 | Stop reason: HOSPADM

## 2023-08-21 RX ADMIN — GABAPENTIN 100 MG: 100 CAPSULE ORAL at 22:00

## 2023-08-21 RX ADMIN — HYDROMORPHONE HYDROCHLORIDE 0.5 MG: 1 INJECTION, SOLUTION INTRAMUSCULAR; INTRAVENOUS; SUBCUTANEOUS at 16:55

## 2023-08-21 RX ADMIN — ONDANSETRON 4 MG: 2 INJECTION INTRAMUSCULAR; INTRAVENOUS at 22:19

## 2023-08-21 RX ADMIN — CLINDAMYCIN PHOSPHATE 600 MG: 600 INJECTION, SOLUTION INTRAVENOUS at 18:09

## 2023-08-21 RX ADMIN — VANCOMYCIN HYDROCHLORIDE 125 MG: 125 CAPSULE ORAL at 20:00

## 2023-08-21 RX ADMIN — VANCOMYCIN HYDROCHLORIDE 750 MG: 750 INJECTION, SOLUTION INTRAVENOUS at 22:00

## 2023-08-21 RX ADMIN — ACETAMINOPHEN 650 MG: 325 TABLET ORAL at 22:18

## 2023-08-21 NOTE — NURSING NOTE
AWAKE AND PLEASANT BED RIDDEN HX WITH NON HEALING RT UPPER SHOULDER TO RT FLANK/PANIS. 02 ACUTE 2 LITERS AND 02 SAT 97% HR 82/MIN. PAIN LEVEL CHRONIC RT HIP AND WOUND AREA 6/10. CALL NEVAREZ NEAR /BED ALARM ON NO DISTRESS.

## 2023-08-21 NOTE — ED PROVIDER NOTES
History  Chief Complaint   Patient presents with   • Skin Problem     Recently admitted for cellulitis; reports being discharged with antibiotic but reports no improvement of symptoms with antibiotic. Pt is a 67 yo female c/o worsening cellulitis and Zoster pain . Pt was DCd on 8/10/23 and completed a 10 day course of ABX as well as Valtex for shingles . Pt states cellulitis is worsening and spreading - she believes shes had fever and chills. Pt denies CP/Pr /SOB -      PT IS BED RIDDEN - pt w severe morbid obesity s/p fx of her acetabulum . Prior to Admission Medications   Prescriptions Last Dose Informant Patient Reported? Taking? DULoxetine (CYMBALTA) 20 mg capsule  Self No No   Sig: Take 1 capsule (20 mg total) by mouth daily   Neomycin-Bacitracin-Polymyxin (HCA TRIPLE ANTIBIOTIC OINTMENT EX)  Self Yes No   Sig: Apply topically To abdominal fold twice per day.    Probiotic Product (PROBIOTIC DAILY PO)  Self Yes No   Sig: Take 1 each by mouth in the morning   Patient not taking: Reported on 12/14/2022   acetaminophen (TYLENOL) 650 mg CR tablet  Self Yes No   Sig: Take 650 mg by mouth every 8 (eight) hours as needed (for osteoarthritis)   allopurinol (ZYLOPRIM) 100 mg tablet  Self No No   Sig: Take 1 tablet (100 mg total) by mouth daily   furosemide (LASIX) 40 mg tablet  Self Yes No   Sig: Take 40 mg by mouth daily Hold for SBP < 100   gabapentin (NEURONTIN) 100 mg capsule   No No   Sig: Take 1 capsule (100 mg total) by mouth 3 (three) times a day   levothyroxine 125 mcg tablet  Self Yes No   Sig: Take 125 mcg by mouth daily   methocarbamol (ROBAXIN) 500 mg tablet   No No   Sig: Take 1 tablet (500 mg total) by mouth 2 (two) times a day   mupirocin (BACTROBAN) 2 % ointment   No No   Sig: Apply topically 3 (three) times a day   polyethylene glycol (MIRALAX) 17 g packet  Self No No   Sig: Take 17 g by mouth daily   Patient not taking: Reported on 12/14/2022   senna-docusate sodium (SENOKOT S) 8.6-50 mg per tablet  Self No No   Sig: Take 1 tablet by mouth daily at bedtime   Patient not taking: Reported on 12/14/2022   triamcinolone (KENALOG) 0.1 % cream  Self Yes No   Sig: Apply 0.1 application topically 2 (two) times a day   valACYclovir (VALTREX) 1,000 mg tablet   No No   Sig: Take 1 tablet (1,000 mg total) by mouth every 8 (eight) hours for 11 doses   warfarin (COUMADIN) 2 mg tablet  Self No No   Sig: Take 1 tablet (2 mg total) by mouth every other day Monday, Wednesday, Friday, sunday      Facility-Administered Medications: None       Past Medical History:   Diagnosis Date   • Atrial fibrillation ([address])    • Bladder stone    • C. difficile colitis    • Cellulitis    • CHF (congestive heart failure) ([address])    • Depression with anxiety    • Disease of thyroid gland    • Diverticulitis    • Enterocolitis    • History of abnormal cervical Pap smear    • Kidney stone    • Lymphedema    • Menopause     Age 53   • Morbid obesity with BMI of 70 and over, adult ([address])    • MRSA (methicillin resistant Staphylococcus aureus)     abdominal wound   • Osteoarthritis    • Phlebitis     left lower leg   • Spondylolysis        Past Surgical History:   Procedure Laterality Date   • APPENDECTOMY     • CARPAL TUNNEL RELEASE     • CHOLECYSTECTOMY     • CYSTOSCOPY      stent   • FOOT SURGERY  1982    bone spur   • KNEE SURGERY     • WISDOM TOOTH EXTRACTION         Family History   Problem Relation Age of Onset   • Heart failure Mother    • Heart disease Mother    • Lymphoma Mother    • Seizures Mother    • Kidney disease Father    • Heart disease Father    • Heart failure Father    • Diabetes type II Father    • Diabetes type II Sister    • Diabetes type II Brother    • Uterine cancer Paternal Aunt    • Breast cancer Neg Hx    • Colon cancer Neg Hx    • Ovarian cancer Neg Hx      I have reviewed and agree with the history as documented.     E-Cigarette/Vaping   • E-Cigarette Use Never User      E-Cigarette/Vaping Substances   • Nicotine No    • THC No    • CBD No    • Flavoring No    • Other No    • Unknown No      Social History     Tobacco Use   • Smoking status: Former     Packs/day: 5.00     Years: 3.00     Total pack years: 15.00     Types: Cigarettes     Start date: 1967     Quit date: 1970     Years since quittin.1   • Smokeless tobacco: Never   • Tobacco comments:     5 packs/day until 5 (age 17-19) - As per [employer]    Vaping Use   • Vaping Use: Never used   Substance Use Topics   • Alcohol use: Not Currently   • Drug use: Not Currently     Types: Marijuana     Comment: As a teen - As per [employer]        Review of Systems   Constitutional: Positive for chills, diaphoresis, fatigue and fever. HENT: Negative. Eyes: Negative. Respiratory: Negative. Cardiovascular: Negative. Gastrointestinal: Negative. Genitourinary: Negative. Musculoskeletal: Positive for arthralgias and gait problem. Skin: Positive for color change and rash. Neurological: Negative for dizziness, tremors, seizures, syncope, weakness, light-headedness, numbness and headaches. Hematological: Bruises/bleeds easily. Psychiatric/Behavioral: The patient is nervous/anxious.         Physical Exam  Physical Exam    Vital Signs  ED Triage Vitals   Temperature Pulse Respirations Blood Pressure SpO2   23 1341 23 1335 23 1335 23 1335 23 1335   (!) 96.9 °F (36.1 °C) 78 20 117/57 94 %      Temp Source Heart Rate Source Patient Position - Orthostatic VS BP Location FiO2 (%)   23 1341 23 1335 23 1335 23 1335 --   Tympanic Monitor Lying Left arm       Pain Score       23 1335       9           Vitals:    23 1335   BP: 117/57   Pulse: 78   Patient Position - Orthostatic VS: Lying         Visual Acuity      ED Medications  Medications - No data to display    Diagnostic Studies  Results Reviewed     Procedure Component Value Units Date/Time    CBC and differential [[id number]] Lab Status: No result Specimen: Blood     Comprehensive metabolic panel [[id number]]     Lab Status: No result Specimen: Blood     Lactic acid [[id number]]     Lab Status: No result Specimen: Blood     Procalcitonin [[id number]]     Lab Status: No result Specimen: Blood     Protime-INR [[id number]]     Lab Status: No result Specimen: Blood     APTT [[id number]]     Lab Status: No result Specimen: Blood     Blood culture #1 [[id number]]     Lab Status: No result Specimen: Blood     Blood culture #2 [[id number]]     Lab Status: No result Specimen: Blood     UA w Reflex to Microscopic w Reflex to Culture [[id number]]     Lab Status: No result Specimen: Urine                  No orders to display              Procedures  Procedures         ED Course                               SBIRT 22yo+    Flowsheet Row Most Recent Value   Initial Alcohol Screen: US AUDIT-C     1. How often do you have a drink containing alcohol? 0 Filed at: 08/21/2023 1338   2. How many drinks containing alcohol do you have on a typical day you are drinking? 0 Filed at: 08/21/2023 1338   3a. Male UNDER 65: How often do you have five or more drinks on one occasion? 0 Filed at: 08/21/2023 1338   3b. FEMALE Any Age, or MALE 65+: How often do you have 4 or more drinks on one occassion? 0 Filed at: 08/21/2023 1338   Audit-C Score 0 Filed at: 08/21/2023 1338   JYOTI: How many times in the past year have you. .. Used an illegal drug or used a prescription medication for non-medical reasons? Never Filed at: 08/21/2023 1338                    MDM    Disposition  Final diagnoses:   None     ED Disposition     None      Follow-up Information    None         Patient's Medications   Discharge Prescriptions    No medications on file       No discharge procedures on file.     PDMP Review       Value Time User    PDMP Reviewed  Yes 5/27/2022 11:10 PM Anjelica Reveles PA-C          ED Provider  Electronically Signed by Result may be elevated if tourniquet was used during collection.     Comprehensive metabolic panel [[id number]]  (Abnormal) Collected: 08/21/23 1546    Lab Status: Final result Specimen: Blood from Arm, Right Updated: 08/21/23 1618     Sodium 134 mmol/L      Potassium 4.7 mmol/L      Chloride 97 mmol/L      CO2 29 mmol/L      ANION GAP 8 mmol/L      BUN 21 mg/dL      Creatinine 1.12 mg/dL      Glucose 90 mg/dL      Calcium 8.7 mg/dL      Corrected Calcium 9.7 mg/dL      AST 26 U/L      ALT 14 U/L      Alkaline Phosphatase 109 U/L      Total Protein 7.9 g/dL      Albumin 2.7 g/dL      Total Bilirubin 0.65 mg/dL      eGFR 49 ml/min/1.73sq m     Narrative:      [place] guidelines for Chronic Kidney Disease (CKD):   •  Stage 1 with normal or high GFR (GFR > 90 mL/min/1.73 square meters)  •  Stage 2 Mild CKD (GFR = 60-89 mL/min/1.73 square meters)  •  Stage 3A Moderate CKD (GFR = 45-59 mL/min/1.73 square meters)  •  Stage 3B Moderate CKD (GFR = 30-44 mL/min/1.73 square meters)  •  Stage 4 Severe CKD (GFR = 15-29 mL/min/1.73 square meters)  •  Stage 5 End Stage CKD (GFR <15 mL/min/1.73 square meters)  Note: GFR calculation is accurate only with a steady state creatinine    Protime-INR [[id number]]  (Abnormal) Collected: 08/21/23 1546    Lab Status: Final result Specimen: Blood from Arm, Right Updated: 08/21/23 1618     Protime 23.3 seconds      INR 2.05    APTT [[id number]]  (Abnormal) Collected: 08/21/23 1546    Lab Status: Final result Specimen: Blood from Arm, Right Updated: 08/21/23 1618     PTT 52 seconds     CBC and differential [[id number]]  (Abnormal) Collected: 08/21/23 1546    Lab Status: Final result Specimen: Blood from Arm, Right Updated: 08/21/23 1556     WBC 13.77 Thousand/uL      RBC 4.27 Million/uL      Hemoglobin 10.6 g/dL      Hematocrit 36.2 %      MCV 85 fL      MCH 24.8 pg      MCHC 29.3 g/dL      RDW 17.7 %      MPV 8.8 fL      Platelets 696 Thousands/uL      nRBC 0 /100 WBCs Neutrophils Relative 87 %      Immat GRANS % 0 %      Lymphocytes Relative 7 %      Monocytes Relative 4 %      Eosinophils Relative 2 %      Basophils Relative 0 %      Neutrophils Absolute 11.89 Thousands/µL      Immature Grans Absolute 0.04 Thousand/uL      Lymphocytes Absolute 0.99 Thousands/µL      Monocytes Absolute 0.51 Thousand/µL      Eosinophils Absolute 0.30 Thousand/µL      Basophils Absolute 0.04 Thousands/µL                  No orders to display              Procedures  Procedures         ED Course                               SBIRT 20yo+    Flowsheet Row Most Recent Value   Initial Alcohol Screen: US AUDIT-C     1. How often do you have a drink containing alcohol? 0 Filed at: 08/21/2023 1338   2. How many drinks containing alcohol do you have on a typical day you are drinking? 0 Filed at: 08/21/2023 1338   3a. Male UNDER 65: How often do you have five or more drinks on one occasion? 0 Filed at: 08/21/2023 1338   3b. FEMALE Any Age, or MALE 65+: How often do you have 4 or more drinks on one occassion? 0 Filed at: 08/21/2023 1338   Audit-C Score 0 Filed at: 08/21/2023 1338   JYOTI: How many times in the past year have you. .. Used an illegal drug or used a prescription medication for non-medical reasons? Never Filed at: 08/21/2023 133                    Medical Decision Making  Patient with probable abdominal wall cellulitis nonresponsive to antibiotic therapy discussed with hospitalist.    Amount and/or Complexity of Data Reviewed  Labs: ordered. Risk  Prescription drug management. Decision regarding hospitalization.           Disposition  Final diagnoses:   Cellulitis of abdominal wall   Chronic atrial fibrillation (HCC)   History of Clostridioides difficile infection   Irritable bowel syndrome   Chronic diastolic congestive heart failure ([address])     Time reflects when diagnosis was documented in both MDM as applicable and the Disposition within this note     Time User Action Codes Description Comment    8/21/2023  5:46 PM Renae Opitz Add [T49.954] Cellulitis of abdominal wall     8/21/2023  8:37 PM Vinay Civil D Add [Z58.816] Cellulitis of trunk, unspecified site of trunk     8/22/2023  4:27 PM Cornelia Swann Add [B02.9] Herpes zoster without complication     5/49/1400  4:28 PM Truerohith Jc Add [M79.3] Panniculitis     8/28/2023 11:31 AM Keiko Bouillon Add [I48.20] Chronic atrial fibrillation ([facility])     8/28/2023 11:31 AM Keiko Bouillon Add [Z86.19] History of Clostridioides difficile infection     8/28/2023 11:31 AM Keiko Bouillon Add [K58.9] Irritable bowel syndrome     8/28/2023 11:31 AM Keiko Bouillon Add [G49.20] Chronic diastolic congestive heart failure [facility])       ED Disposition     ED Disposition   Admit    Condition   Stable    Date/Time   Mon Aug 21, 2023  5:45 PM    Comment   Case was discussed with NP and the patient's admission status was agreed to be  to the service of [address] [place] [address]            MD Documentation    Flowsheet Row Most Recent Value   Transported by [employer] and Unit #) [facility]      RN Documentation    Flowsheet Row Most Recent Value   Transported by (Company and Unit #) [facility]      Follow-up Information     Follow up With Specialties Details Why Contact Info    [facility] Follow up A representative from [address] [place] [address] will contact you to resume services.  [address]      Donnie Gallardo MD Nephrology, Internal Medicine Follow up in 1 week(s)  [facility]  C/Misael Jaramillo PA-C Nephrology   [facility]  [phone]            Discharge Medication List as of 8/28/2023 12:55 PM      START taking these medications    Details   docusate sodium (COLACE) 100 mg capsule Take 1 capsule (100 mg total) by mouth daily Do not start before August 29, 2023., Starting Tue 8/29/2023, Normal      midodrine (PROAMATINE) 2.5 mg tablet Take 1 tablet (2.5 mg total) by mouth 3 (three) times a day before meals, Starting Mon 8/28/2023, Until Wed 9/27/2023, Normal      vancomycin (VANCOCIN) 125 MG capsule Take 1 capsule (125 mg total) by mouth every 12 (twelve) hours for 5 doses, Starting Mon 8/28/2023, Until Thu 8/31/2023, Normal         CONTINUE these medications which have CHANGED    Details   !! warfarin (COUMADIN) 2 mg tablet Take 2 mg by mouth once daily on Sunday, Monday, Wednesday, Friday, and Saturday, Normal      !! warfarin (COUMADIN) 2 mg tablet Take 4 mg once daily on Tuesday and Thursday Do not start before August 29, 2023., Normal       !! - Potential duplicate medications found. Please discuss with provider.       CONTINUE these medications which have NOT CHANGED    Details   acetaminophen (TYLENOL) 650 mg CR tablet Take 650 mg by mouth every 8 (eight) hours as needed (for osteoarthritis), Historical Med      allopurinol (ZYLOPRIM) 100 mg tablet Take 1 tablet (100 mg total) by mouth daily, Starting Mon 9/30/2019, Normal      DULoxetine (CYMBALTA) 20 mg capsule Take 1 capsule (20 mg total) by mouth daily, Starting Wed 8/15/2018, Normal      furosemide (LASIX) 40 mg tablet Take 40 mg by mouth daily Hold for SBP < 100, Historical Med      gabapentin (NEURONTIN) 100 mg capsule Take 1 capsule (100 mg total) by mouth 3 (three) times a day, Starting Thu 8/10/2023, Until Sat 9/9/2023, Normal      levothyroxine 125 mcg tablet Take 125 mcg by mouth daily, Historical Med      triamcinolone (KENALOG) 0.1 % cream Apply 0.1 application topically 2 (two) times a day, Starting Thu 12/16/2021, Historical Med         STOP taking these medications       Neomycin-Bacitracin-Polymyxin (HCA TRIPLE ANTIBIOTIC OINTMENT EX) Comments:   Reason for Stopping:         valACYclovir (VALTREX) 1,000 mg tablet Comments:   Reason for Stopping:               Outpatient Discharge Orders   EXTERNAL: Ambulatory Referral to Home Health   Standing Status: Future Standing Exp.  Date: 08/28/24      Activity as tolerated     Call provider for:  redness, tenderness, or signs of infection (pain, swelling, redness, odor or green/yellow discharge around incision site)       PDMP Review       Value Time User    PDMP Reviewed  Yes 5/27/2022 11:10 PM Anjelica Yoon PA-C          ED Provider  Electronically Signed by           Ghulam Lopez MD  09/04/23 [id number]       Ghulam Lopez MD  09/04/23 [id number]

## 2023-08-22 LAB
ANION GAP SERPL CALCULATED.3IONS-SCNC: 5 MMOL/L
BILIRUB UR QL STRIP: NEGATIVE
BUN SERPL-MCNC: 19 MG/DL (ref 5–25)
CALCIUM SERPL-MCNC: 8.1 MG/DL (ref 8.4–10.2)
CHLORIDE SERPL-SCNC: 101 MMOL/L (ref 96–108)
CLARITY UR: CLEAR
CO2 SERPL-SCNC: 30 MMOL/L (ref 21–32)
COLOR UR: YELLOW
CREAT SERPL-MCNC: 1.08 MG/DL (ref 0.6–1.3)
ERYTHROCYTE [DISTWIDTH] IN BLOOD BY AUTOMATED COUNT: 17.6 % (ref 11.6–15.1)
GFR SERPL CREATININE-BSD FRML MDRD: 51 ML/MIN/1.73SQ M
GLUCOSE SERPL-MCNC: 81 MG/DL (ref 65–140)
GLUCOSE UR STRIP-MCNC: NEGATIVE MG/DL
HCT VFR BLD AUTO: 32 % (ref 34.8–46.1)
HGB BLD-MCNC: 9.4 G/DL (ref 11.5–15.4)
HGB UR QL STRIP.AUTO: NEGATIVE
KETONES UR STRIP-MCNC: NEGATIVE MG/DL
LEUKOCYTE ESTERASE UR QL STRIP: NEGATIVE
MCH RBC QN AUTO: 24.9 PG (ref 26.8–34.3)
MCHC RBC AUTO-ENTMCNC: 29.4 G/DL (ref 31.4–37.4)
MCV RBC AUTO: 85 FL (ref 82–98)
NITRITE UR QL STRIP: NEGATIVE
PH UR STRIP.AUTO: 5 [PH]
PLATELET # BLD AUTO: 513 THOUSANDS/UL (ref 149–390)
PMV BLD AUTO: 8.3 FL (ref 8.9–12.7)
POTASSIUM SERPL-SCNC: 4.4 MMOL/L (ref 3.5–5.3)
PROCALCITONIN SERPL-MCNC: 0.2 NG/ML
PROT UR STRIP-MCNC: NEGATIVE MG/DL
RBC # BLD AUTO: 3.78 MILLION/UL (ref 3.81–5.12)
SODIUM SERPL-SCNC: 136 MMOL/L (ref 135–147)
SP GR UR STRIP.AUTO: 1.02
UROBILINOGEN UR QL STRIP.AUTO: 0.2 E.U./DL
WBC # BLD AUTO: 12.16 THOUSAND/UL (ref 4.31–10.16)

## 2023-08-22 PROCEDURE — 87186 SC STD MICRODIL/AGAR DIL: CPT

## 2023-08-22 PROCEDURE — 87205 SMEAR GRAM STAIN: CPT

## 2023-08-22 PROCEDURE — 87070 CULTURE OTHR SPECIMN AEROBIC: CPT

## 2023-08-22 PROCEDURE — 84145 PROCALCITONIN (PCT): CPT | Performed by: PHYSICIAN ASSISTANT

## 2023-08-22 PROCEDURE — 85027 COMPLETE CBC AUTOMATED: CPT | Performed by: PHYSICIAN ASSISTANT

## 2023-08-22 PROCEDURE — 80048 BASIC METABOLIC PNL TOTAL CA: CPT | Performed by: PHYSICIAN ASSISTANT

## 2023-08-22 PROCEDURE — 81003 URINALYSIS AUTO W/O SCOPE: CPT | Performed by: INTERNAL MEDICINE

## 2023-08-22 PROCEDURE — 99232 SBSQ HOSP IP/OBS MODERATE 35: CPT

## 2023-08-22 RX ORDER — GINSENG 100 MG
1 CAPSULE ORAL 2 TIMES DAILY
Status: DISCONTINUED | OUTPATIENT
Start: 2023-08-22 | End: 2023-08-28 | Stop reason: HOSPADM

## 2023-08-22 RX ORDER — MIDODRINE HYDROCHLORIDE 5 MG/1
2.5 TABLET ORAL
Status: DISCONTINUED | OUTPATIENT
Start: 2023-08-22 | End: 2023-08-28 | Stop reason: HOSPADM

## 2023-08-22 RX ORDER — TRIAMCINOLONE ACETONIDE 1 MG/G
CREAM TOPICAL 2 TIMES DAILY
Status: DISCONTINUED | OUTPATIENT
Start: 2023-08-22 | End: 2023-08-28 | Stop reason: HOSPADM

## 2023-08-22 RX ADMIN — VANCOMYCIN HYDROCHLORIDE 125 MG: 125 CAPSULE ORAL at 21:02

## 2023-08-22 RX ADMIN — GABAPENTIN 100 MG: 100 CAPSULE ORAL at 08:06

## 2023-08-22 RX ADMIN — WARFARIN SODIUM 4 MG: 2 TABLET ORAL at 17:19

## 2023-08-22 RX ADMIN — MIDODRINE HYDROCHLORIDE 2.5 MG: 5 TABLET ORAL at 16:37

## 2023-08-22 RX ADMIN — VANCOMYCIN HYDROCHLORIDE 750 MG: 750 INJECTION, SOLUTION INTRAVENOUS at 09:23

## 2023-08-22 RX ADMIN — VANCOMYCIN HYDROCHLORIDE 125 MG: 125 CAPSULE ORAL at 08:06

## 2023-08-22 RX ADMIN — BACITRACIN 1 LARGE APPLICATION: 500 OINTMENT TOPICAL at 18:48

## 2023-08-22 RX ADMIN — ALLOPURINOL 100 MG: 100 TABLET ORAL at 08:06

## 2023-08-22 RX ADMIN — GABAPENTIN 100 MG: 100 CAPSULE ORAL at 21:03

## 2023-08-22 RX ADMIN — DULOXETINE HYDROCHLORIDE 20 MG: 20 CAPSULE, DELAYED RELEASE ORAL at 08:06

## 2023-08-22 RX ADMIN — LEVOTHYROXINE SODIUM 125 MCG: 25 TABLET ORAL at 05:48

## 2023-08-22 RX ADMIN — VANCOMYCIN HYDROCHLORIDE 750 MG: 750 INJECTION, SOLUTION INTRAVENOUS at 21:00

## 2023-08-22 RX ADMIN — GABAPENTIN 100 MG: 100 CAPSULE ORAL at 16:30

## 2023-08-22 NOTE — ASSESSMENT & PLAN NOTE
Reports 3 yellow/loose stools prior to coming to ED - check C.  Diff  · Patient given clindamycin in ED - Will order prophylaxis vanco  · Has not had bowel movement today, continue with vancomycin and collect sample when able to

## 2023-08-22 NOTE — PROGRESS NOTES
Ino Pryor is a 68 y.o. female who is currently ordered Vancomycin IV with management by the Pharmacy Consult service. Relevant clinical data and objective / subjective history reviewed. Vancomycin Assessment:  Indication and Goal AUC/Trough: Soft tissue (goal -600, trough >10), -600, trough >10  Clinical Status: stable  Micro:   pending  Renal Function:  SCr: 1.08 mg/dL  CrCl: 82.5 mL/min  Renal replacement: Not on dialysis  Days of Therapy: 2  Current Dose: 750mg IV Q12H  Vancomycin Plan:  New Dosinmg IV Q12H  Estimated AUC: 472 mcg*hr/mL  Estimated Trough: 14.7 mcg/mL  Next Level:  with AM labs  Renal Function Monitoring: Daily BMP and [place] will continue to follow closely for s/sx of nephrotoxicity, infusion reactions and appropriateness of therapy. BMP and CBC will be ordered per protocol. We will continue to follow the patient’s culture results and clinical progress daily.     Zeyad Bach, Pharmacist

## 2023-08-22 NOTE — PLAN OF CARE
Problem: Prexisting or High Potential for Compromised Skin Integrity  Goal: Skin integrity is maintained or improved  Description: INTERVENTIONS:  - Identify patients at risk for skin breakdown  - Assess and monitor skin integrity  - Assess and monitor nutrition and hydration status  - Monitor labs   - Assess for incontinence   - Turn and reposition patient  - Assist with mobility/ambulation  - Relieve pressure over bony prominences  - Avoid friction and shearing  - Provide appropriate hygiene as needed including keeping skin clean and dry  - Evaluate need for skin moisturizer/barrier cream  - Collaborate with interdisciplinary team   - Patient/family teaching  - Consider wound care consult .   Outcome: Progressing

## 2023-08-22 NOTE — ASSESSMENT & PLAN NOTE
Reports 3 yellow/loose stools prior to coming to ED - check C.  Diff  · Patient given clindamycin in ED - Will order prophylaxis vanco

## 2023-08-22 NOTE — PROGRESS NOTES
Sherrie Gordon is a 68 y.o. female who is currently ordered Vancomycin IV with management by the Pharmacy Consult service. Relevant clinical data and objective / subjective history reviewed. Vancomycin Assessment:  Indication and Goal AUC/Trough: Soft tissue (goal -600, trough >10), -600, trough >10  Clinical Status: stable  Micro:   pending  Renal Function:  SCr: 1.12 mg/dL  CrCl: 79.6 mL/min  Renal replacement: Not on dialysis  Days of Therapy: 1  Current Dose: 3000 mg iv q 24 hrs  Vancomycin Plan:  New Dosin mg iv q 12 hrs  Estimated AUC: 485 mcg*hr/mL  Estimated Trough: 15.3 mcg/mL  Next Level: 0600 23  Renal Function Monitoring: Daily BMP and UOP  Pharmacy will continue to follow closely for s/sx of nephrotoxicity, infusion reactions and appropriateness of therapy. BMP and CBC will be ordered per protocol. We will continue to follow the patient’s culture results and clinical progress daily.     Ekaterina Qiu, Pharmacist

## 2023-08-22 NOTE — H&P
[address]  H&P  Name: Makr Vázquez 68 y.o. female I MRN: [id number]  Unit/Bed#: -08 I Date of Admission: 8/21/2023   Date of Service: 8/21/2023 I Hospital Day: 0      Assessment/Plan   * Panniculitis  Assessment & Plan  Patient with recurrent abdominal/right truncal infection. Was recently hospitalized August 5 - August 10 for rash with shingles. She was discharged home on Valtrex, linezolid, oral vancomycin prophylaxis. · History of MRSA  · Continue vancomycin    Ambulatory dysfunction  Assessment & Plan  Patient receives home therapy services through [facility]    History of Clostridioides difficile infection  Assessment & Plan  Reports 3 yellow/loose stools prior to coming to ED - check C. Diff  · Patient given clindamycin in ED - Will order prophylaxis vanco    Chronic atrial fibrillation (HCC)  Assessment & Plan  · Continue Coumadin for anticoagulation  · Rate is currently controlled    Gout  Assessment & Plan  · Continue allopurinol    Depression with anxiety  Assessment & Plan  · Continue Cymbalta    Chronic diastolic congestive heart failure (HCC)  Assessment & Plan  Wt Readings from Last 3 Encounters:   08/21/23 (!) 200 kg (440 lb)   08/10/23 (!) 200 kg (441 lb 9.3 oz)   06/12/23 (!) 199 kg (438 lb 7.9 oz)     Continue Lasix        Class 3 severe obesity due to excess calories with serious comorbidity and body mass index (BMI) greater than or equal to 70 in adult [facility])  Assessment & Plan  BMI 77  · Healthy diet lifestyle modification recommended  · Consider referral to bariatric services    Other specified hypothyroidism  Assessment & Plan  · Continue levothyroxine        VTE Pharmacologic Prophylaxis: VTE Score: 9 High Risk (Score >/= 5) - Pharmacological DVT Prophylaxis Ordered: warfarin (Coumadin). Sequential Compression Devices Ordered.   Code Status: Level 1 - Full Code   Discussion with patient    Anticipated Length of Stay: Patient will be admitted on an inpatient basis with an anticipated length of stay of greater than 2 midnights secondary to IV abx, serial exam.    Total Time Spent on Date of Encounter in care of patient: 75 minutes This time was spent on one or more of the following: performing physical exam; counseling and coordination of care; obtaining or reviewing history; documenting in the medical record; reviewing/ordering tests, medications or procedures; communicating with other healthcare professionals and discussing with patient's family/caregivers. Chief Complaint: Worsening cellulitis and zoster pain    History of Present Illness:  Leidy Jorgensen is a 68 y.o. female with a PMH of atrial fibrillation on Coumadin, diastolic CHF, morbid obesity BMI greater than 70, MRSA history, C. difficile colitis history, recent hospitalization for skin rash and shingles who presents with worsening cellulitis and zoster pain. Patient was hospitalized August 5 to August 10 and was discharged home on linezolid through 8/16, Valtrex through 8/13 and oral vancomycin through 8/19. Patient presented to the ED today secondary to worsening cellulitis and spreading, also having zoster pain. Patient reports I just left on the 10th and now I am back with the same problem. Seen by [address] [place] [address] and thought it was a dermatitis and gave ketaconazole cream. Reports lots of weeping. She just got a new mattress and concerned about the drainage. Reports 3 stools before she came today. Hx of C. Diff. Review of Systems:  Review of Systems   Constitutional: Positive for chills. Negative for fever. HENT: Negative for rhinorrhea, sore throat and trouble swallowing. Eyes: Negative for discharge and redness. Respiratory: Positive for cough. Negative for shortness of breath. Cardiovascular: Positive for leg swelling. Negative for chest pain. Gastrointestinal: Negative for abdominal pain, diarrhea, nausea and vomiting.    Genitourinary: Negative for dysuria and hematuria. Musculoskeletal: Positive for neck pain. Negative for back pain. Skin: Positive for color change and rash. Psychiatric/Behavioral: Negative for agitation and confusion. Past Medical and Surgical History:   Past Medical History:   Diagnosis Date   • Atrial fibrillation [facility])    • Bladder stone    • C. difficile colitis    • Cellulitis    • CHF (congestive heart failure) ([facility])    • Depression with anxiety    • Disease of thyroid gland    • Diverticulitis    • Enterocolitis    • History of abnormal cervical Pap smear    • Kidney stone    • Lymphedema    • Menopause     Age 53   • Morbid obesity with BMI of 70 and over, adult ([address])    • MRSA (methicillin resistant Staphylococcus aureus)     abdominal wound   • Osteoarthritis    • Phlebitis     left lower leg   • Spondylolysis        Past Surgical History:   Procedure Laterality Date   • APPENDECTOMY     • CARPAL TUNNEL RELEASE     • CHOLECYSTECTOMY     • CYSTOSCOPY      stent   • FOOT SURGERY  1982    bone spur   • KNEE SURGERY     • WISDOM TOOTH EXTRACTION         Meds/Allergies:  Prior to Admission medications    Medication Sig Start Date End Date Taking?  Authorizing Provider   acetaminophen (TYLENOL) 650 mg CR tablet Take 650 mg by mouth every 8 (eight) hours as needed (for osteoarthritis)    Historical Provider, MD   allopurinol (ZYLOPRIM) 100 mg tablet Take 1 tablet (100 mg total) by mouth daily 9/30/19   Esteban Vargas DO   DULoxetine (CYMBALTA) 20 mg capsule Take 1 capsule (20 mg total) by mouth daily 8/15/18   Esteban Vargas DO   furosemide (LASIX) 40 mg tablet Take 40 mg by mouth daily Hold for SBP < 100    Historical Provider, MD   gabapentin (NEURONTIN) 100 mg capsule Take 1 capsule (100 mg total) by mouth 3 (three) times a day 8/10/23 9/9/23  ORACIO Last   levothyroxine 125 mcg tablet Take 125 mcg by mouth daily    Historical Provider, MD   methocarbamol (ROBAXIN) 500 mg tablet Take 1 tablet (500 mg total) by mouth 2 (two) times a day 6/12/23   DO mi Danielpirocin [facility]) 2 % ointment Apply topically 3 (three) times a day 3/27/23   Meg Jackson PA-C   Neomycin-Bacitracin-Polymyxin (HCA TRIPLE ANTIBIOTIC OINTMENT EX) Apply topically To abdominal fold twice per day. Historical Provider, MD   polyethylene glycol (MIRALAX) 17 g packet Take 17 g by mouth daily  Patient not taking: Reported on 12/14/2022 9/27/22   ORACIO Sewell   Probiotic Product (PROBIOTIC DAILY PO) Take 1 each by mouth in the morning  Patient not taking: Reported on 12/14/2022    Historical Provider, MD   senna-docusate sodium (SENOKOT S) 8.6-50 mg per tablet Take 1 tablet by mouth daily at bedtime  Patient not taking: Reported on 12/14/2022 9/27/22   ORACIO Sewell   triamcinolone (KENALOG) 0.1 % cream Apply 0.1 application topically 2 (two) times a day 12/16/21   Historical Provider, MD   valACYclovir (VALTREX) 1,000 mg tablet Take 1 tablet (1,000 mg total) by mouth every 8 (eight) hours for 11 doses 8/10/23 8/14/23  ORACIO Last   warfarin (COUMADIN) 2 mg tablet Take 1 tablet (2 mg total) by mouth every other day Monday, Wednesday, Friday, sunday 2/22/22   ORACIO Goyal   nystatin (MYCOSTATIN) powder Apply topically 2 (two) times a day 11/21/21 5/28/22  Magdiel Osuna MD     I have reviewed home medications using recent Epic encounter. Allergies:    Allergies   Allergen Reactions   • Augmentin Es-600 [Amoxicillin-Pot Clavulanate] Anaphylaxis and Rash   • Bactrim [Sulfamethoxazole-Trimethoprim] Anaphylaxis   • Cefazolin Itching and Other (See Comments)     Patient developed itching and difficulty swallowing following IV cefazolin administration at [facility] 7/19/20 which required treatment with benadryl and epinephrine - has tolerated other cephalosporins with different side chains including cefepime, cefuroxime, and cephalexin   • Keflex [Cephalexin] Anaphylaxis   • Penicillins Anaphylaxis and Rash     Tolerates cefepime (21)   • Other Rash, Irritability and Edema     Patient reports significant reaction to the use of Max Orb in the abdominal folds leading to swelling, excoriation, maceration and weeping of the skin. • Omeprazole GI Intolerance   • Sulfa Antibiotics Hives   • Latex Rash and Edema   • Vitamin C [Ascorbate - Food Allergy] Rash       Social History:  Marital Status: Single   Occupation: Retired  Patient Pre-hospital Living Situation: Home  Patient Pre-hospital Level of Mobility: non-ambulatory/bed bound  Patient Pre-hospital Diet Restrictions: None  Substance Use History:   Social History     Substance and Sexual Activity   Alcohol Use Not Currently     Social History     Tobacco Use   Smoking Status Former   • Packs/day: 5.00   • Years: 3.00   • Total pack years: 15.00   • Types: Cigarettes   • Start date: 1967   • Quit date: 1970   • Years since quittin.1   Smokeless Tobacco Never   Tobacco Comments    5 packs/day until 5 (age [age]) - As per [employer]      Social History     Substance and Sexual Activity   Drug Use Not Currently   • Types: Marijuana    Comment: As a teen - As per [employer]        Family History:  Family History   Problem Relation Age of Onset   • Heart failure Mother    • Heart disease Mother    • Lymphoma Mother    • Seizures Mother    • Kidney disease Father    • Heart disease Father    • Heart failure Father    • Diabetes type II Father    • Diabetes type II Sister    • Diabetes type II Brother    • Uterine cancer Paternal Aunt    • Breast cancer Neg Hx    • Colon cancer Neg Hx    • Ovarian cancer Neg Hx        Physical Exam:     Vitals:   Blood Pressure: (!) 91/48 (237)  Pulse: 83 (23)  Temperature: 99.5 °F (37.5 °C) (23)  Temp Source: Tympanic (23 1800)  Respirations: 18 (23)  Weight - Scale: (!) 200 kg (440 lb) (23 1335)  SpO2: 92 % (23)    Physical Exam  Vitals reviewed.    Constitutional: Appearance: Normal appearance. She is morbidly obese. HENT:      Head: Normocephalic and atraumatic. Nose: Nose normal.   Eyes:      General:         Right eye: No discharge. Left eye: No discharge. Extraocular Movements: Extraocular movements intact. Conjunctiva/sclera: Conjunctivae normal.   Cardiovascular:      Rate and Rhythm: Normal rate. Rhythm irregular. Pulmonary:      Effort: Pulmonary effort is normal. No respiratory distress. Breath sounds: Normal breath sounds. No wheezing. Abdominal:      General: Bowel sounds are normal. There is no distension. Palpations: Abdomen is soft. Tenderness: There is no abdominal tenderness. There is no guarding. Musculoskeletal:         General: No swelling or tenderness. Normal range of motion. Cervical back: Normal range of motion. Right lower leg: No edema. Left lower leg: No edema. Comments: Deformity left ankle   Skin:     General: Skin is warm. Capillary Refill: Capillary refill takes less than 2 seconds. Coloration: Skin is pale. Comments: Suspected contact dermatitis rash on back. +erythema/edema warmth right pannus/flank. Left hip w/ edema. See images. Neurological:      General: No focal deficit present. Mental Status: She is alert and oriented to person, place, and time. Mental status is at baseline. Motor: Weakness present. Psychiatric:         Mood and Affect: Mood normal.         Behavior: Behavior normal.         Thought Content:  Thought content normal.         Judgment: Judgment normal.            Additional Data:     Lab Results:  Results from last 7 days   Lab Units 08/21/23  1546   WBC Thousand/uL 13.77*   HEMOGLOBIN g/dL 10.6*   HEMATOCRIT % 36.2   PLATELETS Thousands/uL 583*   NEUTROS PCT % 87*   LYMPHS PCT % 7*   MONOS PCT % 4   EOS PCT % 2     Results from last 7 days   Lab Units 08/21/23  1546   SODIUM mmol/L 134*   POTASSIUM mmol/L 4.7   CHLORIDE mmol/L 97   CO2 mmol/L 29   BUN mg/dL 21   CREATININE mg/dL 1.12   ANION GAP mmol/L 8   CALCIUM mg/dL 8.7   ALBUMIN g/dL 2.7*   TOTAL BILIRUBIN mg/dL 0.65   ALK PHOS U/L 109*   ALT U/L 14   AST U/L 26   GLUCOSE RANDOM mg/dL 90     Results from last 7 days   Lab Units 08/21/23  1546   INR  2.05*             Results from last 7 days   Lab Units 08/21/23  1546   LACTIC ACID mmol/L 1.2   PROCALCITONIN ng/ml 0.14       Lines/Drains:  Invasive Devices     Peripheral Intravenous Line  Duration           Peripheral IV 08/21/23 Right Antecubital <1 day                EKG and Other Studies Reviewed on Admission:   · EKG: Atrial fibrillation. HR 75, reviewed and muse personally. ** Please Note: This note has been constructed using a voice recognition system.  **

## 2023-08-22 NOTE — ASSESSMENT & PLAN NOTE
Wt Readings from Last 3 Encounters:   08/21/23 (!) 200 kg (440 lb)   08/10/23 (!) 200 kg (441 lb 9.3 oz)   06/12/23 (!) 199 kg (438 lb 7.9 oz)     Continue Lasix

## 2023-08-22 NOTE — CASE MANAGEMENT
Case Management Assessment & Discharge Planning Note    Patient name Rolanda Marie  Location [address] [facility] [id number] MRN [id number]  : 1950 Date 2023       Current Admission Date: 2023  Current Admission Diagnosis:Panniculitis   Patient Active Problem List    Diagnosis Date Noted   • Shingles 2023   • Cellulitis 2023   • Chronic atrial fibrillation ([address]) 2022   • History of Clostridioides difficile infection 2022   • Lymphedema of extremity 2021   • Other specified hypothyroidism 10/03/2020   • Mild episode of recurrent major depressive disorder ([address]) 10/03/2020   • Idiopathic chronic 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 10/03/2020   • Primary osteoarthritis involving multiple joints 10/03/2020   • Class 3 severe obesity due to excess calories with serious comorbidity and body mass index (BMI) greater than or equal to 70 in [facility]) 10/03/2020   • Chronic diastolic congestive heart failure ([address]) 10/03/2020   • Panniculitis 10/03/2020   • Gastroesophageal reflux disease without esophagitis 10/03/2020   • Hx MRSA infection 2020   • Impaired mobility 2019   • Osteoarthritis of glenohumeral joint 2019   • Left ovarian cyst 2017   • Depression with anxiety 07/15/2017   • Anemia 2016   • Ambulatory dysfunction 2016   • Adjustment disorder 2013   • Irritable bowel syndrome 2012   • Left atrial enlargement 2012   • Left ventricular hypertrophy 2012   • Carpal tunnel syndrome 2012   • Gout 2012   • Hyperlipidemia 2012   • Symptomatic menopausal or female climacteric states 2012      LOS (days): 1  Geometric Mean LOS (GMLOS) (days): 2.90  Days to GMLOS:2.2     OBJECTIVE:  PATIENT READMITTED TO HOSPITAL  Risk of Unplanned Readmission Score: 23.38         Current admission status: Inpatient       Preferred Pharmacy:   [address] [address] [address] [facility] [address]  Phone: [phone] Fax: [phone]    [facility] [address] [address]  Phone: [phone] Fax: [phone]    Primary Care Provider: Berenice Garcia MD    Primary Insurance: MEDICARE  Secondary Insurance: AARP    ASSESSMENT:  [place] Proxies    There are no active Health Care Proxies on file.          Readmission Root Cause  30 Day Readmission: No     Patient Information  Admitted from[de-identified] Home  Mental Status: Alert  During Assessment patient was accompanied by: Not accompanied during assessment  Assessment information provided by[de-identified] Patient  Primary Caregiver: Other (Comment)  Caregiver's Name[de-identified] [address] [phone]  Caregiver's Relationship to Patient[de-identified] Other (Specify) (caregiver from [facility])  [address] [place] Number[de-identified] [phone]  [place] of Residence: [facility] do you live in?: [address] [place] entry access options. Select all that apply.: Ramp  Type of Current Residence: [address] home  Upon entering residence, is there a bedroom on the main floor (no further steps)?: Yes (pt stays on the main floor)  Upon entering residence, is there a bathroom on the main floor (no further steps)?: Yes  In the last 12 months, was there a time when you were not able to pay the mortgage or rent on time?: No  In the last 12 months, how many places have you lived?: 1  In the last 12 months, was there a time when you did not have a steady place to sleep or slept in a shelter (including now)?: No  Homeless/housing insecurity resource given?: N/A  Living Arrangements: Lives Alone (caregvier 12hrs /day x 7 days per week)  Is patient a ?: No     Activities of Daily Living Prior to Admission  Functional Status: Total dependent  Completes ADLs independently?: No  Level of ADL dependence: Assistance  Ambulates independently?: No  Level of ambulatory dependence:  Total Dependent  Does patient use assisted devices?: Yes  Assisted Devices (DME) used: Hospital Bed, Wheelchair  Does patient currently own DME?: Yes  What DME does the patient currently own?: Melonie Dux Chair/[place]Cracken, Bedside Commode, Shower Chair (walk in shower, pulse ox,. bp cuff, bed pan)  Does patient have a history of Outpatient Therapy (PT/OT)?: No  Does the patient have a history of Short-Term Rehab?: Yes ([address] [facility], Eusebio Marcus)  Does patient have a history of HHC?: Yes Radha Shrestha)  Does patient currently have [address]?: Yes     Current Home Health Care  Type of Current Home Care Services: Nurse visit  Current Home Health Agency[de-identified] [facility] Provider[de-identified] PCP     Patient Information Continued  Income Source: Pension/shelter  Does patient have prescription coverage?: Yes (mail order & Sana Rodriguez)  Within the past 12 months, you worried that your food would run out before you got the money to buy more.: Never true  Within the past 12 months, the food you bought just didn't last and you didn't have money to get more.: Never true  Food insecurity resource given?: N/A  Does patient receive dialysis treatments?: No  Does patient have a history of substance abuse?: No  Does patient have a history of Mental Health Diagnosis?: Yes (depression/anxiety)  Is patient receiving treatment for mental health?: Yes (medication from pcp)  Has patient received inpatient treatment related to mental health in the last 2 years?: No        Means of Transportation  [facility] Other (Comment) (medical transport)  In the past 12 months, has lack of transportation kept you from medical appointments or from getting medications?: No  In the past 12 months, has lack of transportation kept you from meetings, work, or from getting things needed for daily living?: No  Was application for public transport provided?: N/A           DISCHARGE DETAILS:     Discharge planning discussed with[de-identified] patient  Freedom of Choice: Yes  Comments - Freedom of Choice: pt is active with [address]- referrals sent via [employer] [facility]         Is the patient interested in [facility] at discharge?: Yes  [facility] requested[de-identified] [facility] Name[de-identified] [address]  Provider[de-identified] PCP  Home Health Services Needed[de-identified] Heart Failure Management, Wound/Ostomy Care (resumption of care)  Homebound Criteria Met[de-identified] Requires Medical Transportation  Supporting Clincal Findings[de-identified] Bed Bound or Wheelchair Bound     DME Referral Provided  Referral made for DME?: No     Other Referral/Resources/Interventions Provided:  Interventions: Other (Specify), [facility]  Referral Comments: pt will return home with [facility] and waiver caregiver to resume. Pr will need S transport home when stable. CM to follow.      Would you like to participate in our [address] service program?  : No - Declined     Treatment Team Recommendation: Home with [address] (home with [address] to resume.  Sent referral for CHARLEY via [employer])

## 2023-08-22 NOTE — ASSESSMENT & PLAN NOTE
Patient with recurrent abdominal/right truncal infection. Was recently hospitalized August 5 - August 10 for rash with shingles. She was discharged home on Valtrex, linezolid, oral vancomycin prophylaxis.   · History of MRSA  · Continue vancomycin

## 2023-08-22 NOTE — PROGRESS NOTES
[address]  Progress Note  Name: Micky De La Rosa  MRN: [id number]  Unit/Bed#: -52 I Date of Admission: 8/21/2023   Date of Service: 8/22/2023 I Hospital Day: 1    Assessment/Plan   * Panniculitis  Assessment & Plan  Patient with recurrent abdominal/right truncal infection. Was recently hospitalized August 5 - August 10 for rash with shingles. She was discharged home on Valtrex, linezolid, oral vancomycin prophylaxis. Patient has high risk of frequent reinfections due to obesity  · History of MRSA  · Continue vancomycin  · Due to frequent readmissions, will consult ID for further assistance with antibiotic/antiviral regimen. · Wound care consult    Chronic atrial fibrillation (HCC)  Assessment & Plan  · Continue Coumadin for anticoagulation  · Rate is currently controlled  · Monitor PT-INR    History of Clostridioides difficile infection  Assessment & Plan  Reports 3 yellow/loose stools prior to coming to ED - check C.  Diff  · Patient given clindamycin in ED - Will order prophylaxis vanco  · Has not had bowel movement today, continue with vancomycin and collect sample when able to    Gout  Assessment & Plan  · Continue allopurinol    Depression with anxiety  Assessment & Plan  · Continue Cymbalta    Ambulatory dysfunction  Assessment & Plan  Patient receives [facility] therapy services [facility]    Chronic diastolic congestive heart failure ([address])  Assessment & Plan  Wt Readings from Last 3 Encounters:   08/21/23 (!) 200 kg (440 lb)   08/10/23 (!) 200 kg (441 lb 9.3 oz)   06/12/23 (!) 199 kg (438 lb 7.9 oz)     Continue Lasix, has not received due to hypotension, will add low dose midodrine  Monitor fluid status  I&Os    Class 3 severe obesity due to excess calories with serious comorbidity and body mass index (BMI) greater than or equal to 70 in adult [facility])  Assessment & Plan  BMI 77  · Healthy diet lifestyle modification recommended  · Consider referral to bariatric services    Other specified hypothyroidism  Assessment & Plan  · Continue levothyroxine         VTE Pharmacologic Prophylaxis: VTE Score: 9 High Risk (Score >/= 5) - Pharmacological DVT Prophylaxis Ordered: warfarin (Coumadin). Sequential Compression Devices Ordered. Patient Centered Rounds: I performed bedside rounds with nursing staff today. Discussions with Specialists or Other Care Team Provider: nursing, case management    Education and Discussions with Family / Patient: Patient declined call to . Total Time Spent on Date of Encounter in care of patient: 35 minutes This time was spent on one or more of the following: performing physical exam; counseling and coordination of care; obtaining or reviewing history; documenting in the medical record; reviewing/ordering tests, medications or procedures; communicating with other healthcare professionals and discussing with patient's family/caregivers. Current Length of Stay: 1 day(s)  Current Patient Status: Inpatient   Certification Statement: The patient will continue to require additional inpatient hospital stay due to panniculitis  Discharge Plan: Anticipate discharge in >72 hrs to home with home services. Code Status: Level 1 - Full Code    Subjective:   Patient seen and examined at bedside this morning. She states that she is feeling a little better today. She is upset that she is constantly getting the cellulitis and having the weeping of wounds. But she is not really having any pain at this time more so itchiness on her back. Has not had any episodes of diarrhea today. States her last bowel movement was yesterday when she had 3 episodes of diarrhea. Denies nausea, vomiting, fever, chills, chest pain, shortness of breath. Was on 2 L of oxygen briefly but on examination she was on room air saturating around 95%.      Objective:     Vitals:   Temp (24hrs), Av °F (36.7 °C), Min:96.6 °F (35.9 °C), Max:99.5 °F (37.5 °C)    Temp: [96.6 °F (35.9 °C)-99.5 °F (37.5 °C)] 97.8 °F (36.6 °C)  HR:  [72-83] 75  Resp:  [16-18] 16  BP: ()/(45-60) 85/57  SpO2:  [92 %-98 %] 98 %  Body mass index is 77.94 kg/m². Input and Output Summary (last 24 hours): Intake/Output Summary (Last 24 hours) at 8/22/2023 1637  Last data filed at 8/22/2023 0501  Gross per 24 hour   Intake 610 ml   Output 400 ml   Net 210 ml       Physical Exam:   Physical Exam  Vitals reviewed. Constitutional:       General: She is not in acute distress. Appearance: She is obese. She is ill-appearing (Chronically). She is not toxic-appearing. Comments: Morbidly obese   HENT:      Head: Normocephalic and atraumatic. Nose: Nose normal.      Mouth/Throat:      Mouth: Mucous membranes are moist.   Eyes:      Pupils: Pupils are equal, round, and reactive to light. Cardiovascular:      Rate and Rhythm: Normal rate. Rhythm irregular. Heart sounds: No murmur heard. Pulmonary:      Effort: No respiratory distress. Breath sounds: No stridor. No wheezing. Comments: Decreased effort and breath sounds bilaterally. Saturating at 95% on room air  Abdominal:      General: Bowel sounds are normal. There is no distension. Palpations: Abdomen is soft. There is no mass. Tenderness: There is no abdominal tenderness. Musculoskeletal:         General: Deformity (left ankle) present. Skin:     General: Skin is warm. Coloration: Skin is pale. Findings: Erythema and rash present. Comments: Rash extensively on the right side of the back. Still with edema and warmth right pannus/flank. Left hip w/ edema. Weeping present on the back   Neurological:      General: No focal deficit present. Mental Status: She is alert and oriented to person, place, and time. Motor: Weakness present.    Psychiatric:         Mood and Affect: Mood normal.         Behavior: Behavior normal.          Additional Data:     Labs:  Results from last 7 days   Lab Units 08/22/23  0525 08/21/23  1546   WBC Thousand/uL 12.16* 13.77*   HEMOGLOBIN g/dL 9.4* 10.6*   HEMATOCRIT % 32.0* 36.2   PLATELETS Thousands/uL 513* 583*   NEUTROS PCT %  --  87*   LYMPHS PCT %  --  7*   MONOS PCT %  --  4   EOS PCT %  --  2     Results from last 7 days   Lab Units 08/22/23  0525 08/21/23  1546   SODIUM mmol/L 136 134*   POTASSIUM mmol/L 4.4 4.7   CHLORIDE mmol/L 101 97   CO2 mmol/L 30 29   BUN mg/dL 19 21   CREATININE mg/dL 1.08 1.12   ANION GAP mmol/L 5 8   CALCIUM mg/dL 8.1* 8.7   ALBUMIN g/dL  --  2.7*   TOTAL BILIRUBIN mg/dL  --  0.65   ALK PHOS U/L  --  109*   ALT U/L  --  14   AST U/L  --  26   GLUCOSE RANDOM mg/dL 81 90     Results from last 7 days   Lab Units 08/21/23  1546   INR  2.05*             Results from last 7 days   Lab Units 08/22/23  0525 08/21/23  1546   LACTIC ACID mmol/L  --  1.2   PROCALCITONIN ng/ml 0.20 0.14       Lines/Drains:  Invasive Devices     Peripheral Intravenous Line  Duration           Peripheral IV 08/21/23 Right Antecubital 1 day                      Imaging: No pertinent imaging reviewed. Recent Cultures (last 7 days):   Results from last 7 days   Lab Units 08/21/23  1546   BLOOD CULTURE  Received in Microbiology Lab. Culture in Progress. Received in Microbiology Lab. Culture in Progress.        Last 24 Hours Medication List:   Current Facility-Administered Medications   Medication Dose Route Frequency Provider Last Rate   • acetaminophen  650 mg Oral Q4H PRN Maria Luzsa Victor M PA-C     • allopurinol  100 mg Oral Daily Maria Luz Dow PA-C     • DULoxetine  20 mg Oral Daily [address] [place], [place]     • furosemide  40 mg Intravenous BID (diuretic) Maria Luz Dow PA-C     • gabapentin  100 mg Oral TID Maria Luz Dow PA-C     • levothyroxine  125 mcg Oral Early Morning Maria Luz Dow PA-C     • midodrine  2.5 mg Oral TID AC Venu Reilly PA-C     • ondansetron  4 mg Intravenous Q6H PRN Ni Brownlee Audra Drake PA-C     • vancomycin oral (capsules or solution)  125 mg Oral Q12H [address] Anjelica Flores [place], [place]     • vancomycin  750 mg Intravenous Q12H Ebbie Jessica [place], [place] Stopped (08/22/23 1023)   • [START ON 8/23/2023] warfarin  2 mg Oral Once per day on Sun Mon Wed Fri Sat Anam Casillas PA-C     • warfarin  4 mg Oral Once per day on Tue Thu Anam Casillas PA-C          Today, Patient Was Seen By: Светлана Laboy PA-C    **Please Note: This note may have been constructed using a voice recognition system. **

## 2023-08-22 NOTE — ASSESSMENT & PLAN NOTE
BMI 77  · Healthy diet lifestyle modification recommended  · Consider referral to bariatric services

## 2023-08-22 NOTE — ASSESSMENT & PLAN NOTE
Patient with recurrent abdominal/right truncal infection. Was recently hospitalized August 5 - August 10 for rash with shingles. She was discharged home on Valtrex, linezolid, oral vancomycin prophylaxis. Patient has high risk of frequent reinfections due to obesity  · History of MRSA  · Continue vancomycin  · Due to frequent readmissions, will consult ID for further assistance with antibiotic/antiviral regimen.   · Wound care consult

## 2023-08-23 LAB
ANION GAP SERPL CALCULATED.3IONS-SCNC: 5 MMOL/L
ATRIAL RATE: 82 BPM
BUN SERPL-MCNC: 21 MG/DL (ref 5–25)
CALCIUM SERPL-MCNC: 8.1 MG/DL (ref 8.4–10.2)
CHLORIDE SERPL-SCNC: 98 MMOL/L (ref 96–108)
CO2 SERPL-SCNC: 30 MMOL/L (ref 21–32)
CREAT SERPL-MCNC: 1.17 MG/DL (ref 0.6–1.3)
ERYTHROCYTE [DISTWIDTH] IN BLOOD BY AUTOMATED COUNT: 17.5 % (ref 11.6–15.1)
GFR SERPL CREATININE-BSD FRML MDRD: 46 ML/MIN/1.73SQ M
GLUCOSE SERPL-MCNC: 114 MG/DL (ref 65–140)
HCT VFR BLD AUTO: 32.1 % (ref 34.8–46.1)
HGB BLD-MCNC: 9.1 G/DL (ref 11.5–15.4)
INR PPP: 2.28 (ref 0.84–1.19)
MAGNESIUM SERPL-MCNC: 2.1 MG/DL (ref 1.9–2.7)
MCH RBC QN AUTO: 24.4 PG (ref 26.8–34.3)
MCHC RBC AUTO-ENTMCNC: 28.3 G/DL (ref 31.4–37.4)
MCV RBC AUTO: 86 FL (ref 82–98)
PLATELET # BLD AUTO: 493 THOUSANDS/UL (ref 149–390)
PMV BLD AUTO: 8.1 FL (ref 8.9–12.7)
POTASSIUM SERPL-SCNC: 4.5 MMOL/L (ref 3.5–5.3)
PROCALCITONIN SERPL-MCNC: 0.14 NG/ML
PROTHROMBIN TIME: 25 SECONDS (ref 11.6–14.5)
QRS AXIS: 33 DEGREES
QRSD INTERVAL: 68 MS
QT INTERVAL: 374 MS
QTC INTERVAL: 417 MS
RBC # BLD AUTO: 3.73 MILLION/UL (ref 3.81–5.12)
SODIUM SERPL-SCNC: 133 MMOL/L (ref 135–147)
T WAVE AXIS: 61 DEGREES
VANCOMYCIN TROUGH SERPL-MCNC: 12.5 UG/ML (ref 10–20)
VENTRICULAR RATE: 75 BPM
WBC # BLD AUTO: 9.37 THOUSAND/UL (ref 4.31–10.16)

## 2023-08-23 PROCEDURE — 80048 BASIC METABOLIC PNL TOTAL CA: CPT

## 2023-08-23 PROCEDURE — 85027 COMPLETE CBC AUTOMATED: CPT

## 2023-08-23 PROCEDURE — 84145 PROCALCITONIN (PCT): CPT

## 2023-08-23 PROCEDURE — 99232 SBSQ HOSP IP/OBS MODERATE 35: CPT | Performed by: HOSPITALIST

## 2023-08-23 PROCEDURE — 93010 ELECTROCARDIOGRAM REPORT: CPT | Performed by: INTERNAL MEDICINE

## 2023-08-23 PROCEDURE — 85610 PROTHROMBIN TIME: CPT

## 2023-08-23 PROCEDURE — 80202 ASSAY OF VANCOMYCIN: CPT | Performed by: PHYSICIAN ASSISTANT

## 2023-08-23 PROCEDURE — 99222 1ST HOSP IP/OBS MODERATE 55: CPT | Performed by: INTERNAL MEDICINE

## 2023-08-23 PROCEDURE — 83735 ASSAY OF MAGNESIUM: CPT

## 2023-08-23 RX ADMIN — MIDODRINE HYDROCHLORIDE 2.5 MG: 5 TABLET ORAL at 06:13

## 2023-08-23 RX ADMIN — DULOXETINE HYDROCHLORIDE 20 MG: 20 CAPSULE, DELAYED RELEASE ORAL at 09:51

## 2023-08-23 RX ADMIN — GABAPENTIN 100 MG: 100 CAPSULE ORAL at 09:51

## 2023-08-23 RX ADMIN — FUROSEMIDE 40 MG: 10 INJECTION, SOLUTION INTRAMUSCULAR; INTRAVENOUS at 17:45

## 2023-08-23 RX ADMIN — LINEZOLID 300 MG: 600 TABLET, FILM COATED ORAL at 17:45

## 2023-08-23 RX ADMIN — BACITRACIN 1 LARGE APPLICATION: 500 OINTMENT TOPICAL at 17:45

## 2023-08-23 RX ADMIN — LEVOTHYROXINE SODIUM 125 MCG: 25 TABLET ORAL at 06:13

## 2023-08-23 RX ADMIN — MIDODRINE HYDROCHLORIDE 2.5 MG: 5 TABLET ORAL at 11:58

## 2023-08-23 RX ADMIN — ALLOPURINOL 100 MG: 100 TABLET ORAL at 09:51

## 2023-08-23 RX ADMIN — VANCOMYCIN HYDROCHLORIDE 125 MG: 125 CAPSULE ORAL at 09:51

## 2023-08-23 RX ADMIN — VANCOMYCIN HYDROCHLORIDE 125 MG: 125 CAPSULE ORAL at 21:35

## 2023-08-23 RX ADMIN — BACITRACIN 1 LARGE APPLICATION: 500 OINTMENT TOPICAL at 09:52

## 2023-08-23 RX ADMIN — VANCOMYCIN HYDROCHLORIDE 750 MG: 750 INJECTION, SOLUTION INTRAVENOUS at 09:51

## 2023-08-23 RX ADMIN — WARFARIN SODIUM 2 MG: 2 TABLET ORAL at 17:45

## 2023-08-23 RX ADMIN — TRIAMCINOLONE ACETONIDE: 1 CREAM TOPICAL at 09:52

## 2023-08-23 RX ADMIN — GABAPENTIN 100 MG: 100 CAPSULE ORAL at 17:45

## 2023-08-23 RX ADMIN — MIDODRINE HYDROCHLORIDE 2.5 MG: 5 TABLET ORAL at 17:45

## 2023-08-23 RX ADMIN — GABAPENTIN 100 MG: 100 CAPSULE ORAL at 21:35

## 2023-08-23 NOTE — PROGRESS NOTES
[address]  Progress Note  Name: Fidelia Padron  MRN: [id number]  Unit/Bed#: -58 I Date of Admission: 8/21/2023   Date of Service: 8/23/2023 I Hospital Day: 2    Assessment/Plan   Chronic atrial fibrillation [facility])  Assessment & Plan  · Continue Coumadin for anticoagulation  · Rate is currently controlled  · Monitor PT-INR - currently therapeutic    History of Clostridioides difficile infection  Assessment & Plan  Reports 3 yellow/loose stools prior to coming to ED - check C. Diff  · Patient given clindamycin in ED - Will order prophylaxis vanco  · Has not had bowel movement today, continue with vancomycin and collect sample when able to    Gout  Assessment & Plan  · Continue allopurinol    Depression with anxiety  Assessment & Plan  · Continue Cymbalta    Ambulatory dysfunction  Assessment & Plan  Patient receives home therapy services through [facility]    Chronic diastolic congestive heart failure ([address])  Assessment & Plan  Wt Readings from Last 3 Encounters:   08/21/23 (!) 200 kg (440 lb)   08/10/23 (!) 200 kg (441 lb 9.3 oz)   06/12/23 (!) 199 kg (438 lb 7.9 oz)     Continue Lasix, has not received due to hypotension, will add low dose midodrine  Monitor fluid status  I&Os    Class 3 severe obesity due to excess calories with serious comorbidity and body mass index (BMI) greater than or equal to 70 in adult [facility])  Assessment & Plan  BMI 77  · Healthy diet lifestyle modification recommended  · Consider referral to bariatric services    Other specified hypothyroidism  Assessment & Plan  · Continue levothyroxine    * Panniculitis  Assessment & Plan  Patient with recurrent abdominal/right truncal infection. Was recently hospitalized August 5 - August 10 for rash with shingles. She was discharged home on Valtrex, linezolid, oral vancomycin prophylaxis.  Patient has high risk of frequent reinfections due to obesity  · History of MRSA  · Continue vancomycin  · Due to frequent readmissions, will consult ID for further assistance with antibiotic/antiviral regimen. · Wound care consult            VTE  Prophylaxis:   Pharmacologic: in place  Mechanical VTE Prophylaxis in Place: Yes    Patient Centered Rounds: I have performed bedside rounds with nursing staff today. Discussions with Specialists or Other Care Team Provider: case management    Education and Discussions with Family / Patient: pt      Current Length of Stay: 2 day(s)    Current Patient Status: Inpatient        Code Status: Level 1 - Full Code    Discharge Plan: Pt will require continued inpatient hospitalization. Subjective: The patient has not endorsed any marked improvement in her truncal/abd infection  No other complaints. Patient is seen and examined at bedside. All other ROS are negative. Objective:     Vitals:   Temp (24hrs), Av.9 °F (36.6 °C), Min:97.7 °F (36.5 °C), Max:98.2 °F (36.8 °C)    Temp:  [97.7 °F (36.5 °C)-98.2 °F (36.8 °C)] 97.7 °F (36.5 °C)  HR:  [67-75] 67  Resp:  [16-18] 18  BP: ()/(45-58) 98/45  SpO2:  [97 %-99 %] 98 %  Body mass index is 77.94 kg/m². Input and Output Summary (last 24 hours):     No intake or output data in the 24 hours ending 23 0915    Physical Exam:       GEN: No acute distress, comfortable, obese  HEEENT: No JVD, PERRLA, no scleral icterus  RESP: Lungs clear to auscultation bilaterally  CV: irreg, +s1/s2   ABD: SOFT NON TENDER, POSITIVE BOWEL SOUNDS, NO DISTENTION  PSYCH: CALM  NEURO: Mentation baseline, NO FOCAL DEFICITS  SKIN: back/flank, rash, edema.    EXTREM: NO EDEMA    Additional Data:     Labs:    Results from last 7 days   Lab Units 23  0520 23  0525 23  1546   WBC Thousand/uL 9.37   < > 13.77*   HEMOGLOBIN g/dL 9.1*   < > 10.6*   HEMATOCRIT % 32.1*   < > 36.2   PLATELETS Thousands/uL 493*   < > 583*   NEUTROS PCT %  --   --  87*   LYMPHS PCT %  --   --  7*   MONOS PCT %  --   --  4   EOS PCT %  --   --  2    < > = values in this interval not displayed. Results from last 7 days   Lab Units 08/23/23  0520 08/22/23  0525 08/21/23  1546   SODIUM mmol/L 133*   < > 134*   POTASSIUM mmol/L 4.5   < > 4.7   CHLORIDE mmol/L 98   < > 97   CO2 mmol/L 30   < > 29   BUN mg/dL 21   < > 21   CREATININE mg/dL 1.17   < > 1.12   ANION GAP mmol/L 5   < > 8   CALCIUM mg/dL 8.1*   < > 8.7   ALBUMIN g/dL  --   --  2.7*   TOTAL BILIRUBIN mg/dL  --   --  0.65   ALK PHOS U/L  --   --  109*   ALT U/L  --   --  14   AST U/L  --   --  26   GLUCOSE RANDOM mg/dL 114   < > 90    < > = values in this interval not displayed. Results from last 7 days   Lab Units 08/23/23  0520   INR  2.28*             Results from last 7 days   Lab Units 08/23/23  0520 08/22/23  0525 08/21/23  1546   LACTIC ACID mmol/L  --   --  1.2   PROCALCITONIN ng/ml 0.14 0.20 0.14       Lines/Drains:  Invasive Devices     Peripheral Intravenous Line  Duration           Peripheral IV 08/21/23 Right Antecubital 1 day                Telemetry:        * I Have Reviewed All Lab Data Listed Above. Imaging:     Results for orders placed during the hospital encounter of 01/07/23    XR chest 1 view portable    Narrative  CHEST    INDICATION:   Chest pain. COMPARISON:  CXR 3/27/2022. EXAM PERFORMED/VIEWS:  XR CHEST PORTABLE      FINDINGS:    Heart at upper limit of normal in size. The lungs are clear. No pneumothorax or pleural effusion. Osseous structures appear within normal limits for patient age. Impression  No acute cardiopulmonary disease. Workstation performed: [id number]    Results for orders placed in visit on 07/11/17    XR chest pa & lateral    Narrative  CHEST 2 View    INDICATION:  Edema. History of atrial fibrillation. Former smoker. COMPARISON:  None    VIEWS:  PA and lateral projections; 3 images    FINDINGS:    Mild cardiomegaly. The lungs are clear. No pneumothorax or pleural effusion.     Visualized osseous structures appear within normal limits for the patient's age. Impression  Mild cardiomegaly. No active pulmonary disease. Workstation performed: [id number]      *I have reviewed all imaging reports listed above      Recent Cultures (last 7 days):     Results from last 7 days   Lab Units 08/21/23  1546   BLOOD CULTURE  No Growth at 24 hrs. No Growth at 24 hrs. Last 24 Hours Medication List:   Current Facility-Administered Medications   Medication Dose Route Frequency Provider Last Rate   • acetaminophen  650 mg Oral Q4H PRN Rachelle Alexandra PA-C     • allopurinol  100 mg Oral Daily Rachelle Alexandra PA-C     • bacitracin  1 large application Topical BID [facility]     • DULoxetine  20 mg Oral Daily Rachelle Alexandra PA-C     • furosemide  40 mg Intravenous BID (diuretic) Rachelle Alexandra PA-C     • gabapentin  100 mg Oral TID Rachelle Alexandra PA-C     • levothyroxine  125 mcg Oral Early Morning Rachelle Alexandra PA-C     • midodrine  2.5 mg Oral TID BIRDIE Alvares PA-C     • ondansetron  4 mg Intravenous Q6H PRN Rachelle Alexandra PA-C     • triamcinolone   Topical BID Lorelie Cecelia Swann PA-C     • vancomycin oral (capsules or solution)  125 mg Oral Q12H [address] [facility]     • vancomycin  750 mg Intravenous Q12H Rachelle Alexandra PA-C 750 mg (08/22/23 2100)   • warfarin  2 mg Oral Once per day on Sun Mon Wed Fri Sat Rachelle Alexandra PA-C     • warfarin  4 mg Oral Once per day on Tue Thu Rachelle Alexandra PA-C          Today, Patient Was Seen By: Sandi Vigil MD    ** Please Note: Dictation voice to text software may have been used in the creation of this document.  **

## 2023-08-23 NOTE — PROGRESS NOTES
Coy Prasad is a 68 y.o. female who is currently ordered Vancomycin IV with management by the Pharmacy Consult service. Relevant clinical data and objective / subjective history reviewed. Vancomycin Assessment:  Indication and Goal AUC/Trough: Soft tissue (goal -600, trough >10), -600, trough >10  Clinical Status: stable  Micro:     Renal Function:  SCr: 1.17 mg/dL  CrCl: 76.2 mL/min  Renal replacement: Not on dialysis  Days of Therapy: 3  Current Dose: 750mg IV Q12H  Vancomycin Plan:  New Dosinmg IV Q12H  Estimated AUC: 462 mcg*hr/mL  Estimated Trough: 15.5 mcg/mL  Next Level: 8/30 with AM labs  Renal Function Monitoring: Daily BMP and UOP  Pharmacy will continue to follow closely for s/sx of nephrotoxicity, infusion reactions and appropriateness of therapy. BMP and CBC will be ordered per protocol. We will continue to follow the patient’s culture results and clinical progress daily.     Christina Braxton, Pharmacist

## 2023-08-23 NOTE — CONSULTS
Consultation - Infectious Disease   Brigid Patten 68 y.o. female MRN: [id number]  Unit/Bed#: -01 Encounter: [id number]      IMPRESSION & RECOMMENDATIONS:   1. Right buttock cellulitis and right-sided panniculitis. Patient was seen earlier this month for similar issue and this was thought to be a secondary cellulitis postviral process. She completed antibiotic on 8/16 but had reports of continued drainage in the skin and I suspect ongoing maceration leading now to recurrent cellulitis. White count has improved. Exam improving compared to prior images. Patient also has been on and off diuresis due to blood pressures at home which may have also been contributing. There is also question of dermatitis along the back. We will transition to oral linezolid 300 mg every 12 hours given #2, 3 and 4  Discontinue vancomycin  Continue oral vancomycin prophylaxis for #5  Continue to trend fever curve/vitals  Repeat CBC and chemistry tomorrow  Monitor skin exam  Monitor for tolerance antibiotic  Follow-up wound care evaluation  Consider steroid cream along the back  Recommend diuresis  Plan for total 7 days of antibiotic therapy through 8/27  Continue oral vancomycin prophylaxis through 8/30  Additional supportive care as per primary  Additional interventions pending clinical course    2. Morbid obesity. Patient with BMI of 77. Unfortunately this limits antibiotic delivery and makes dosing of vancomycin also difficult. Transition to linezolid as above  Monitor for tolerance  Supportive care per primary    3. Antibiotic allergies. Noted with severe allergies to penicillins and lower generation cephalosporins. Has tolerated cefepime. Unfortunately this limits antibiotic options. Switch to linezolid as above  Monitor for tolerance  We will adjust allergy list as needed    4. Reduced EGFR. Patient's EGFR noted to be less than 60. This does affect linezolid dosing. Dose reduction as above.   We will further dose adjust as needed    5. History of C. Difficile. No active diarrhea currently. We will continue prophylaxis as above while on systemic antibiotic. Monitor abdominal exam and stool output otherwise. Above plan discussed in detail with the patient and with primary service    I have spent a total time of 80 minutes on 08/23/23 in caring for this patient including Diagnostic results, Risks and benefits of tx options, Patient and family education, Impressions, Documenting in the medical record, Reviewing / ordering tests, medicine, procedures  , Obtaining or reviewing history   and Communicating with other healthcare professionals . HISTORY OF PRESENT ILLNESS:  Reason for Consult: Cellulitis    HPI: Ino Pryor is a 68y.o. year old female with obesity, recurrent panniculitis, CHF, gout, hypothyroidism, atrial fibrillation on Coumadin. The patient is known to me from her last admission earlier this month. At that time she was diagnosed with a suspected episode of recurrent zosters that led to a secondary cellulitis involving her back. Patient was ultimately placed on oral antibiotic to continue through 8/16 along with Valtrex which was meant to go until 8/13. She was also continued on oral vancomycin prophylaxis through 8/19. The patient reports that since discharge she continued to have significant drainage from her back and from her pannus and she reports that she was not taking diuretics regularly due to low blood pressures. She was having to change dressings and paddings multiple times a day. Skin also was very irritated and itchy. She reports seeing home care providers and was recommended for various creams and salves but when she did not improve she came to the hospital for further evaluation. She reportedly was cream for suspected dermatitis on the back. She was also concerned about the change in the quality of her drainage.   She reports outpatient stool C. difficile testing but results are not available. Patient was ultimately admitted and started on antibiotics. Clinical case was overall concerning for panniculitis and recurrent cellulitis. She has been on vancomycin since admission. White count has normalized today. Blood cultures are without growth. Previous wound cultures reviewed. Current wound cultures pending. No images. Vital stable. UA unremarkable. Chemistry otherwise unremarkable. Clinical images reviewed from wound care evaluations. Reviewed with the patient overall my concern for secondary cellulitis/recurrent cellulitis developing in the setting of ongoing maceration of the tissues. We discussed the hope that diuretics will help to reduce drainage and possible need for steroid cream.  Patient is breast understanding. She also states that unfortunately the drainage is not manageable she may not be able to go home with her current level of care available. Additional questions answered. We are consulted at this time for further assistance. REVIEW OF SYSTEMS:  A complete 12 point system-based review of systems is negative other than that noted in the HPI.     PAST MEDICAL HISTORY:  Past Medical History:   Diagnosis Date   • Atrial fibrillation [facility])    • Bladder stone    • C. difficile colitis    • Cellulitis    • CHF (congestive heart failure) ([facility])    • Depression with anxiety    • Disease of thyroid gland    • Diverticulitis    • Enterocolitis    • History of abnormal cervical Pap smear    • Kidney stone    • Lymphedema    • Menopause     Age 53   • Morbid obesity with BMI of 70 and over, adult ([address])    • MRSA (methicillin resistant Staphylococcus aureus)     abdominal wound   • Osteoarthritis    • Phlebitis     left lower leg   • Spondylolysis      Past Surgical History:   Procedure Laterality Date   • APPENDECTOMY     • CARPAL TUNNEL RELEASE     • CHOLECYSTECTOMY     • CYSTOSCOPY      stent   • FOOT SURGERY  1982    bone spur   • KNEE SURGERY • WISDOM TOOTH EXTRACTION         FAMILY HISTORY:  Non-contributory    SOCIAL HISTORY:  Social History   Social History     Substance and Sexual Activity   Alcohol Use Not Currently     Social History     Substance and Sexual Activity   Drug Use Not Currently   • Types: Marijuana    Comment: As a teen - As per [employer]      Social History     Tobacco Use   Smoking Status Former   • Packs/day: 5.00   • Years: 3.00   • Total pack years: 15.00   • Types: Cigarettes   • Start date: 1967   • Quit date: 1970   • Years since quittin.1   Smokeless Tobacco Never   Tobacco Comments    5 packs/day until 5 (age 17-19) - As per [employer]        ALLERGIES:  Allergies   Allergen Reactions   • Augmentin Es-600 [Amoxicillin-Pot Clavulanate] Anaphylaxis and Rash   • Bactrim [Sulfamethoxazole-Trimethoprim] Anaphylaxis   • Cefazolin Itching and Other (See Comments)     Patient developed itching and difficulty swallowing following IV cefazolin administration at [facility] 20 which required treatment with benadryl and epinephrine - has tolerated other cephalosporins with different side chains including cefepime, cefuroxime, and cephalexin   • Keflex [Cephalexin] Anaphylaxis   • Penicillins Anaphylaxis and Rash     Tolerates cefepime (21)   • Other Rash, Irritability and Edema     Patient reports significant reaction to the use of Max Orb in the abdominal folds leading to swelling, excoriation, maceration and weeping of the skin. • Omeprazole GI Intolerance   • Sulfa Antibiotics Hives   • Latex Rash and Edema   • Vitamin C [Ascorbate - Food Allergy] Rash       MEDICATIONS:  All current active medications have been reviewed.     PHYSICAL EXAM:  Temp:  [97.7 °F (36.5 °C)-98.2 °F (36.8 °C)] 97.7 °F (36.5 °C)  HR:  [67-73] 73  Resp:  [18] 18  BP: ()/(45-58) 101/49  SpO2:  [97 %-99 %] 99 %  Temp (24hrs), Av °F (36.7 °C), Min:97.7 °F (36.5 °C), Max:98.2 °F (36.8 °C)  Current: Temperature: 97.7 °F (36.5 °C)    Intake/Output Summary (Last 24 hours) at 8/23/2023 1504  Last data filed at 8/23/2023 1300  Gross per 24 hour   Intake 360 ml   Output 550 ml   Net -190 ml       General Appearance:   Chronically ill-appearing, morbidly obese, provides detailed history. Requires assistance with turning. Head:  Normocephalic, without obvious abnormality, atraumatic   Eyes:  Conjunctiva pink and sclera anicteric, both eyes   Nose: Nares normal, mucosa normal, no drainage   Throat: Oropharynx moist without lesions   Neck: Supple, symmetrical, no adenopathy, no tenderness/mass/nodules   Back:   Symmetric, no curvature, ROM limited by body habitus, no CVA tenderness   Lungs:    Decreased breath sounds throughout, no wheezes or rales   Chest Wall:  No tenderness or deformity   Heart:  RRR; no murmur, rub or gallop noted   Abdomen:   Soft, non-tender, non-distended, positive bowel sounds    Extremities: No cyanosis, clubbing or edema; changes of lymphedema in the lower legs bilaterally   Skin:  On turning patient is noted to have denuded and sloughing skin along the right back. Her pannus also noted to be edematous and erythematous with this has receded compared to previously drawn borders. Left-sided pannus with less severe changes. Compared to previous pictures erythema has overall decline. She also has areas of splits in the skin in the groin and under the breasts. Lymph nodes: Cervical, supraclavicular nodes normal   Neurologic: Alert and oriented times 3, has difficulty moving lower extremities due to her body habitus. Able to move upper extremities. LABS, IMAGING, & OTHER STUDIES:  In completing this consult I have performed an extensive review of the medical records in epic including review of the notes, radiographs, and laboratory results as detailed below. Lab Results:  I have personally reviewed pertinent labs. Comments/Interpretations:  White count normalized      Results from last 7 days   Lab Units 08/23/23  0520 08/22/23  0525 08/21/23  1546   WBC Thousand/uL 9.37 12.16* 13.77*   HEMOGLOBIN g/dL 9.1* 9.4* 10.6*   PLATELETS Thousands/uL 493* 513* 583*     Results from last 7 days   Lab Units 08/23/23  0520 08/22/23  0525 08/21/23  1546   POTASSIUM mmol/L 4.5   < > 4.7   CHLORIDE mmol/L 98   < > 97   CO2 mmol/L 30   < > 29   BUN mg/dL 21   < > 21   CREATININE mg/dL 1.17   < > 1.12   EGFR ml/min/1.73sq m 46   < > 49   CALCIUM mg/dL 8.1*   < > 8.7   AST U/L  --   --  26   ALT U/L  --   --  14   ALK PHOS U/L  --   --  109*    < > = values in this interval not displayed. Results from last 7 days   Lab Units 08/22/23  1840 08/21/23  1546   BLOOD CULTURE   --  No Growth at 24 hrs. No Growth at 24 hrs. GRAM STAIN RESULT  No Polys or Bacteria seen  --    WOUND CULTURE  Culture too young- will reincubate  --        Imaging Studies:   I have personally reviewed pertinent imaging study reports and images in PACS. Comments/Interpretations:  No images performed on this admission    Other Studies:   I have personally reviewed other pertinent reports as below. Records in [address]: No new cultures or office visits available    Nursing home/EMS Records: None    Current/Prior Cultures: Recent cultures with Pseudomonas and MRSA.

## 2023-08-23 NOTE — WOUND OSTOMY CARE
Consult Note - Wound   Milli Salinas 68 y.o. female MRN: [id number]  Unit/Bed#: MS 36-1 Encounter: [id number]    History and Present Illness:  71year old female patient admitted with panniculitis. Wound care consulted for skin breakdown. She has a history of morbid obesity with a BMI of 77.94, chronic lymphedema. recurrent cellulitis, C-diff colitis, AFIB, CHF and hypothyroidism, on recent admission patient had shingles. Patient recently seen on 08/07/2023 on prior admission for skin breakdown. Bariatric bed has been ordered for patient. Assessment Findings:   She is awake, alert and oriented. She is in bed for assessment. She is able to be rolled onto her side with maximum assistance.  She is able to feed herself, needs assistance with hygiene care. She is pleasant and cooperative. She is not incontinent of stool, has a Purewick in place for urinary management. Patient seen with primary RN. 1. Bilateral heels intact  2. Skin folds to the bilateral knees, upper thighs, right and left abdominal fold, bilateral breast folds, right upper back skin folds and right and left lateral thigh-hip areas of lymphedema. Erythema to the folds, scattered areas of partial thickness erosion with scant yellow drainage. Patient refuses Interdry, Maxorb rope or sheets, nystatin powder and Allevyn foam dressings. She states she places ABD pads in her folds and uses Kenalog cream at home to the open areas. Bacitracin ordered by provider, patient is willing to have applied to lateral thighs-hip lymphedema  3. Her lower and upper back with areas of erythema and dry scaly skin, no drainage. Applied nourishing cream to the skin    Skin and Wound Care Plan:   1. Apply skin nourishing cream to the skin daily. 2. Turn and reposition patient Q2 hours  3. Cleanse folds to the thighs and posterior knees, abdominal and breast folds with Remedy foaming cleanser, pat dry.  Apply Bacitracin to the areas of skin breakdown to the breast, abdomen, lateral thighs and leg folds in areas of skin breakdown, place ABD pads in folds, cover lateral thighs with incontinence pads,  Change pads BID  and PRN  4. Cleanse back and buttocks with Remedy foaming cleanser, pat dry. Apply Moisturizing cream to the skin BID  5. Bariatric bed    Wounds:  Wound 03/22/23 MASD Breast Left;Upper (Active)   Wound Image   08/23/23 1408   Wound Description Beefy red;Pink 08/23/23 1408   Alison-wound Assessment Intact 08/23/23 1408   Wound Length (cm) 0.2 cm 08/23/23 1408   Wound Width (cm) 2.2 cm 08/23/23 1408   Wound Depth (cm) 0.1 cm 08/23/23 1408   Wound Surface Area (cm^2) 0.44 cm^2 08/23/23 1408   Wound Volume (cm^3) 0.044 cm^3 08/23/23 1408   Calculated Wound Volume (cm^3) 0.04 cm^3 08/23/23 1408   Change in Wound Size % 84 08/23/23 1408   Drainage Amount None 08/23/23 1408   Non-staged Wound Description Partial thickness 08/23/23 1408   Treatments Cleansed 08/23/23 1408   Dressing ABD; Other (Comment) 08/23/23 1408   Patient Tolerance Tolerated well 08/23/23 1408       Wound 08/05/23 MASD Hip Right;Posterior (Active)   Wound Image   08/21/23 2047   Wound Description Beefy red;Edema 08/23/23 1421   Alison-wound Assessment Erythema 08/23/23 1421   Drainage Amount Moderate 08/23/23 1421   Drainage Description Serous; Yellow 08/23/23 1421   Treatments Cleansed 08/23/23 1421   Dressing Open to air 08/23/23 1100   Wound packed? No 08/22/23 2000   Patient Tolerance Tolerated well 08/23/23 1421   Dressing Status Clean;Dry; Intact; Other (Comment) 08/23/23 1100       Wound 08/05/23 MASD Pelvis Anterior; Left (Active)   Wound Image   08/23/23 1413   Wound Description Dry;Pink;Beefy red 08/23/23 1413   Alison-wound Assessment Rash 08/23/23 1413   Wound Length (cm) 1.5 cm 08/23/23 1413   Wound Width (cm) 22 cm 08/23/23 1413   Wound Depth (cm) 0.1 cm 08/23/23 1413   Wound Surface Area (cm^2) 33 cm^2 08/23/23 1413   Wound Volume (cm^3) 3.3 cm^3 08/23/23 1413   Calculated Wound Volume (cm^3) 3.3 cm^3 08/23/23 1413   Change in Wound Size % -307.41 08/23/23 1413   Drainage Amount Scant 08/23/23 1413   Drainage Description Serous 08/23/23 1413   Non-staged Wound Description Partial thickness 08/23/23 1413   Treatments Cleansed 08/23/23 1413   Dressing ABD; Other (Comment) 08/23/23 1413   Patient Tolerance Tolerated well 08/23/23 1413       Wound 08/05/23 MASD Lymphedema Hip Right (Active)   Wound Image    08/23/23 1413   Wound Description Beefy red;Edema 08/23/23 1413   Alison-wound Assessment Clean;Dry; Intact 08/23/23 1100   Drainage Amount Moderate 08/23/23 1100   Drainage Description Serous 08/23/23 1100   Treatments Cleansed 08/23/23 1413   Dressing Moisture barrier 08/23/23 1413   Wound packed? No 08/22/23 1112   Patient Tolerance Tolerated well 08/23/23 1413   Dressing Status Clean;Dry; Intact 08/23/23 1100       Wound 08/05/23 Other (comment) Other (Comment) Back Right (Active)   Wound Image    08/23/23 1419   Wound Description Dry;Beefy red;Fragile 08/23/23 1419   Alison-wound Assessment Scaly 08/23/23 1419   Drainage Amount None 08/23/23 1419   Drainage Description Serous 08/22/23 2000   Non-staged Wound Description Partial thickness 08/23/23 1419   Treatments Cleansed 08/23/23 1419   Dressing Other (Comment) 08/23/23 1419   Wound packed? No 08/22/23 1112   Patient Tolerance Tolerated well 08/23/23 1419   Dressing Status Clean; Intact;Dry 08/23/23 1100       Wound 08/07/23 Breast Right; Lower (Active)   Wound Image   08/23/23 1410   Wound Description Beefy red;Fragile 08/23/23 1410   Alison-wound Assessment Erythema; Excoriated;Fragile 08/23/23 1410   Wound Length (cm) 2 cm 08/23/23 1410   Wound Width (cm) 14 cm 08/23/23 1410   Wound Depth (cm) 0.1 cm 08/23/23 1410   Wound Surface Area (cm^2) 28 cm^2 08/23/23 1410   Wound Volume (cm^3) 2.8 cm^3 08/23/23 1410   Calculated Wound Volume (cm^3) 2.8 cm^3 08/23/23 1410   Change in Wound Size % -311.76 08/23/23 1410   Drainage Amount Scant 08/23/23 1410 Drainage Description Yellow;Serous 08/23/23 1410   Non-staged Wound Description Partial thickness 08/23/23 1410   Treatments Cleansed 08/23/23 1410   Dressing ABD; Other (Comment) 08/23/23 1410   Dressing Changed New 08/23/23 1410   Patient Tolerance Tolerated well 08/23/23 1410       Wound 08/21/23 MASD Abdomen Medial;Right (Active)   Wound Description Beefy red;Fragile;Pink 08/23/23 1421   Alison-wound Assessment Intact 08/23/23 1421   Wound Site Closure Open to air 08/23/23 1100   Drainage Amount Scant 08/23/23 1421   Drainage Description Serous 08/23/23 1421   Non-staged Wound Description Partial thickness 08/23/23 1421   Treatments Cleansed 08/23/23 1421   Dressing ABD; Other (Comment) 08/23/23 1421   Wound packed?  No 08/22/23 2000   Patient Tolerance Tolerated well 08/23/23 1421   Dressing Status Other (Comment) 08/22/23 1112     Reviewed plan of care with primary VALARIE Carmen  Recommendations written as orders  Wound care team to follow weekly while admitted  Questions or concerns [address] [facility] Nurse    Nuria ELIZONDON, RN, [facility]

## 2023-08-23 NOTE — ASSESSMENT & PLAN NOTE
Wt Readings from Last 3 Encounters:   08/21/23 (!) 200 kg (440 lb)   08/10/23 (!) 200 kg (441 lb 9.3 oz)   06/12/23 (!) 199 kg (438 lb 7.9 oz)     Continue Lasix, has not received due to hypotension, will add low dose midodrine  Monitor fluid status  I&Os

## 2023-08-23 NOTE — CONSULTS
The patients vancomycin therapy has been discontinued. Thank you for this consult;  Pharmacy will sign-off now

## 2023-08-23 NOTE — NURSING NOTE
AWAKE AND PLEASANT. R/A STATUS . 02 SAT 98% HR 72/MIN. NO DISTRESS . PAIN LEVEL 6/10 TO RT HIP. [facility][note 3] MAINTAINED FOR CL YELLOW RETURN.

## 2023-08-23 NOTE — PLAN OF CARE
Problem: SKIN/TISSUE INTEGRITY - ADULT  Goal: Skin Integrity remains intact(Skin Breakdown Prevention)  Description: Assess:  -Perform Gucci assessment every   -Clean and moisturize skin every   -Inspect skin when repositioning, toileting, and assisting with ADLS  -Assess under medical devices such as  every   -Assess extremities for adequate circulation and sensation     Bed Management:  -Have minimal linens on bed & keep smooth, unwrinkled  -Change linens as needed when moist or perspiring  -Avoid sitting or lying in one position for more than  hours while in bed  -Keep HOB at degrees     Toileting:  -Offer bedside commode  -Assess for incontinence every   -Use incontinent care products after each incontinent episode such as     Activity:  -Mobilize patient  times a day  -Encourage activity and walks on unit  -Encourage or provide ROM exercises   -Turn and reposition patient every Hours  -Use appropriate equipment to lift or move patient in bed  -Instruct/ Assist with weight shifting every when out of bed in chair  -Consider limitation of chair time  hour intervals    Skin Care:  -Avoid use of baby powder, tape, friction and shearing, hot water or constrictive clothing  -Relieve pressure over bony prominences using   -Do not massage red bony areas    Next Steps:  -Teach patient strategies to minimize risks such as    -Consider consults to  interdisciplinary teams such as   Outcome: Progressing  Goal: Incision(s), wounds(s) or drain site(s) healing without S/S of infection  Description: INTERVENTIONS  - Assess and document dressing, incision, wound bed, drain sites and surrounding tissue  - Provide patient and family education  - Perform skin care/dressing changes every   Outcome: Progressing

## 2023-08-24 LAB
ALBUMIN SERPL BCP-MCNC: 2.4 G/DL (ref 3.5–5)
ALP SERPL-CCNC: 100 U/L (ref 34–104)
ALT SERPL W P-5'-P-CCNC: 10 U/L (ref 7–52)
ANION GAP SERPL CALCULATED.3IONS-SCNC: 5 MMOL/L
AST SERPL W P-5'-P-CCNC: 16 U/L (ref 13–39)
BASOPHILS # BLD AUTO: 0.05 THOUSANDS/ÂΜL (ref 0–0.1)
BASOPHILS NFR BLD AUTO: 1 % (ref 0–1)
BILIRUB SERPL-MCNC: 0.25 MG/DL (ref 0.2–1)
BUN SERPL-MCNC: 21 MG/DL (ref 5–25)
CALCIUM ALBUM COR SERPL-MCNC: 9.7 MG/DL (ref 8.3–10.1)
CALCIUM SERPL-MCNC: 8.4 MG/DL (ref 8.4–10.2)
CHLORIDE SERPL-SCNC: 98 MMOL/L (ref 96–108)
CO2 SERPL-SCNC: 31 MMOL/L (ref 21–32)
CREAT SERPL-MCNC: 1.13 MG/DL (ref 0.6–1.3)
EOSINOPHIL # BLD AUTO: 0.36 THOUSAND/ÂΜL (ref 0–0.61)
EOSINOPHIL NFR BLD AUTO: 5 % (ref 0–6)
ERYTHROCYTE [DISTWIDTH] IN BLOOD BY AUTOMATED COUNT: 17.6 % (ref 11.6–15.1)
GFR SERPL CREATININE-BSD FRML MDRD: 48 ML/MIN/1.73SQ M
GLUCOSE SERPL-MCNC: 113 MG/DL (ref 65–140)
HCT VFR BLD AUTO: 33.4 % (ref 34.8–46.1)
HGB BLD-MCNC: 9.5 G/DL (ref 11.5–15.4)
IMM GRANULOCYTES # BLD AUTO: 0.06 THOUSAND/UL (ref 0–0.2)
IMM GRANULOCYTES NFR BLD AUTO: 1 % (ref 0–2)
INR PPP: 2.11 (ref 0.84–1.19)
LYMPHOCYTES # BLD AUTO: 0.96 THOUSANDS/ÂΜL (ref 0.6–4.47)
LYMPHOCYTES NFR BLD AUTO: 12 % (ref 14–44)
MCH RBC QN AUTO: 24.6 PG (ref 26.8–34.3)
MCHC RBC AUTO-ENTMCNC: 28.4 G/DL (ref 31.4–37.4)
MCV RBC AUTO: 87 FL (ref 82–98)
MONOCYTES # BLD AUTO: 0.48 THOUSAND/ÂΜL (ref 0.17–1.22)
MONOCYTES NFR BLD AUTO: 6 % (ref 4–12)
NEUTROPHILS # BLD AUTO: 6.03 THOUSANDS/ÂΜL (ref 1.85–7.62)
NEUTS SEG NFR BLD AUTO: 75 % (ref 43–75)
NRBC BLD AUTO-RTO: 0 /100 WBCS
PLATELET # BLD AUTO: 521 THOUSANDS/UL (ref 149–390)
PMV BLD AUTO: 8.2 FL (ref 8.9–12.7)
POTASSIUM SERPL-SCNC: 4.4 MMOL/L (ref 3.5–5.3)
PROT SERPL-MCNC: 6.8 G/DL (ref 6.4–8.4)
PROTHROMBIN TIME: 23.6 SECONDS (ref 11.6–14.5)
RBC # BLD AUTO: 3.86 MILLION/UL (ref 3.81–5.12)
SODIUM SERPL-SCNC: 134 MMOL/L (ref 135–147)
WBC # BLD AUTO: 7.94 THOUSAND/UL (ref 4.31–10.16)

## 2023-08-24 PROCEDURE — 85610 PROTHROMBIN TIME: CPT

## 2023-08-24 PROCEDURE — 85025 COMPLETE CBC W/AUTO DIFF WBC: CPT | Performed by: HOSPITALIST

## 2023-08-24 PROCEDURE — 99232 SBSQ HOSP IP/OBS MODERATE 35: CPT | Performed by: INTERNAL MEDICINE

## 2023-08-24 PROCEDURE — 99232 SBSQ HOSP IP/OBS MODERATE 35: CPT | Performed by: HOSPITALIST

## 2023-08-24 PROCEDURE — 80053 COMPREHEN METABOLIC PANEL: CPT | Performed by: HOSPITALIST

## 2023-08-24 RX ADMIN — WARFARIN SODIUM 4 MG: 2 TABLET ORAL at 17:20

## 2023-08-24 RX ADMIN — BACITRACIN 1 LARGE APPLICATION: 500 OINTMENT TOPICAL at 17:20

## 2023-08-24 RX ADMIN — GABAPENTIN 100 MG: 100 CAPSULE ORAL at 21:00

## 2023-08-24 RX ADMIN — LINEZOLID 300 MG: 600 TABLET, FILM COATED ORAL at 09:02

## 2023-08-24 RX ADMIN — VANCOMYCIN HYDROCHLORIDE 125 MG: 125 CAPSULE ORAL at 08:56

## 2023-08-24 RX ADMIN — ALLOPURINOL 100 MG: 100 TABLET ORAL at 08:56

## 2023-08-24 RX ADMIN — VANCOMYCIN HYDROCHLORIDE 125 MG: 125 CAPSULE ORAL at 20:49

## 2023-08-24 RX ADMIN — DULOXETINE HYDROCHLORIDE 20 MG: 20 CAPSULE, DELAYED RELEASE ORAL at 08:56

## 2023-08-24 RX ADMIN — LINEZOLID 300 MG: 600 TABLET, FILM COATED ORAL at 20:49

## 2023-08-24 RX ADMIN — TRIAMCINOLONE ACETONIDE: 1 CREAM TOPICAL at 09:03

## 2023-08-24 RX ADMIN — FUROSEMIDE 40 MG: 10 INJECTION, SOLUTION INTRAMUSCULAR; INTRAVENOUS at 17:20

## 2023-08-24 RX ADMIN — BACITRACIN 1 LARGE APPLICATION: 500 OINTMENT TOPICAL at 09:02

## 2023-08-24 RX ADMIN — MIDODRINE HYDROCHLORIDE 2.5 MG: 5 TABLET ORAL at 11:59

## 2023-08-24 RX ADMIN — TRIAMCINOLONE ACETONIDE: 1 CREAM TOPICAL at 17:20

## 2023-08-24 RX ADMIN — LEVOTHYROXINE SODIUM 125 MCG: 25 TABLET ORAL at 06:18

## 2023-08-24 RX ADMIN — GABAPENTIN 100 MG: 100 CAPSULE ORAL at 08:56

## 2023-08-24 RX ADMIN — MIDODRINE HYDROCHLORIDE 2.5 MG: 5 TABLET ORAL at 06:18

## 2023-08-24 RX ADMIN — FUROSEMIDE 40 MG: 10 INJECTION, SOLUTION INTRAMUSCULAR; INTRAVENOUS at 08:56

## 2023-08-24 RX ADMIN — GABAPENTIN 100 MG: 100 CAPSULE ORAL at 17:20

## 2023-08-24 RX ADMIN — MIDODRINE HYDROCHLORIDE 2.5 MG: 5 TABLET ORAL at 17:20

## 2023-08-24 NOTE — ASSESSMENT & PLAN NOTE
Wt Readings from Last 3 Encounters:   08/21/23 (!) 200 kg (440 lb)   08/10/23 (!) 200 kg (441 lb 9.3 oz)   06/12/23 (!) 199 kg (438 lb 7.9 oz)     Continue Lasix with low dose midodrine to allow adequate blood pressure for IV diuresis.   Monitor fluid status  I&Os

## 2023-08-24 NOTE — PROGRESS NOTES
[address]  Progress Note  Name: Amauri Foster  MRN: [id number]  Unit/Bed#: -21 I Date of Admission: 8/21/2023   Date of Service: 8/24/2023 I Hospital Day: 3    Assessment/Plan   Chronic atrial fibrillation [facility])  Assessment & Plan  · Continue Coumadin for anticoagulation  · Rate is currently controlled  · Monitor PT-INR - currently therapeutic    History of Clostridioides difficile infection  Assessment & Plan  Reports 3 yellow/loose stools prior to coming to ED - check C. Diff  · Patient given clindamycin in ED - Will order prophylaxis vanco  · Has not had bowel movement today, continue with vancomycin and collect sample when able to    Gout  Assessment & Plan  · Continue allopurinol    Depression with anxiety  Assessment & Plan  · Continue Cymbalta    Ambulatory dysfunction  Assessment & Plan  Patient receives home therapy services through [facility]    Chronic diastolic congestive heart failure [facility])  Assessment & Plan  Wt Readings from Last 3 Encounters:   08/21/23 (!) 200 kg (440 lb)   08/10/23 (!) 200 kg (441 lb 9.3 oz)   06/12/23 (!) 199 kg (438 lb 7.9 oz)     Continue Lasix with low dose midodrine to allow adequate blood pressure for IV diuresis. Monitor fluid status  I&Os    Class 3 severe obesity due to excess calories with serious comorbidity and body mass index (BMI) greater than or equal to 70 in adult [facility])  Assessment & Plan  BMI 77  · Healthy diet lifestyle modification recommended  · Consider referral to bariatric services    Other specified hypothyroidism  Assessment & Plan  · Continue levothyroxine    * Panniculitis  Assessment & Plan  Patient with recurrent abdominal/right truncal infection. Was recently hospitalized August 5 - August 10 for rash with shingles. She was discharged home on Valtrex, linezolid, oral vancomycin prophylaxis.  Patient has high risk of frequent reinfections due to obesity  · History of MRSA  · apprec ID - > transitioned to linezolid. Continue IV diuresis  · Wound care consult         VTE  Prophylaxis:   Pharmacologic: in place  Mechanical VTE Prophylaxis in Place: Yes    Patient Centered Rounds: I have performed bedside rounds with nursing staff today. Discussions with Specialists or Other Care Team Provider: case management    Education and Discussions with Family / Patient: pt       Current Length of Stay: 3 day(s)    Current Patient Status: Inpatient        Code Status: Level 1 - Full Code    Discharge Plan: Pt will require continued inpatient hospitalization. Subjective: The patient does endorse some decreased weeping from the back after receiving IV diuresis  She states she is hopeful with a few more days of diuresis she would get there    Patient is seen and examined at bedside. All other ROS are negative. Objective:     Vitals:   Temp (24hrs), Av.7 °F (36.5 °C), Min:97.6 °F (36.4 °C), Max:97.9 °F (36.6 °C)    Temp:  [97.6 °F (36.4 °C)-97.9 °F (36.6 °C)] 97.9 °F (36.6 °C)  HR:  [66-77] 66  Resp:  [18-19] 18  BP: ()/(44-61) 119/58  SpO2:  [96 %-99 %] 99 %  Body mass index is 77.94 kg/m². Input and Output Summary (last 24 hours): Intake/Output Summary (Last 24 hours) at 2023 0944  Last data filed at 2023 7818  Gross per 24 hour   Intake 360 ml   Output 2200 ml   Net -1840 ml       Physical Exam:       GEN: No acute distress, comfortable, obese  HEEENT: No JVD, PERRLA, no scleral icterus  RESP: Lungs clear to auscultation bilaterally  CV: RRR, +s1/s2   ABD: SOFT NON TENDER, POSITIVE BOWEL SOUNDS, NO DISTENTION  PSYCH: CALM  NEURO: Mentation baseline, NO FOCAL DEFICITS  SKIN: back flank erythema, edema.   EXTREM: NO EDEMA    Additional Data:     Labs:    Results from last 7 days   Lab Units 23  0436   WBC Thousand/uL 7.94   HEMOGLOBIN g/dL 9.5*   HEMATOCRIT % 33.4*   PLATELETS Thousands/uL 521*   NEUTROS PCT % 75   LYMPHS PCT % 12*   MONOS PCT % 6   EOS PCT % 5     Results from last 7 days   Lab Units 08/24/23  0436   SODIUM mmol/L 134*   POTASSIUM mmol/L 4.4   CHLORIDE mmol/L 98   CO2 mmol/L 31   BUN mg/dL 21   CREATININE mg/dL 1.13   ANION GAP mmol/L 5   CALCIUM mg/dL 8.4   ALBUMIN g/dL 2.4*   TOTAL BILIRUBIN mg/dL 0.25   ALK PHOS U/L 100   ALT U/L 10   AST U/L 16   GLUCOSE RANDOM mg/dL 113     Results from last 7 days   Lab Units 08/24/23  0436   INR  2.11*             Results from last 7 days   Lab Units 08/23/23  0520 08/22/23  0525 08/21/23  1546   LACTIC ACID mmol/L  --   --  1.2   PROCALCITONIN ng/ml 0.14 0.20 0.14       Lines/Drains:  Invasive Devices     Peripheral Intravenous Line  Duration           Peripheral IV 08/21/23 Right Antecubital 2 days          Drain  Duration           External Urinary Catheter <1 day                Telemetry:        * I Have Reviewed All Lab Data Listed Above. Imaging:     Results for orders placed during the hospital encounter of 01/07/23    XR chest 1 view portable    Narrative  CHEST    INDICATION:   Chest pain. COMPARISON:  CXR 3/27/2022. EXAM PERFORMED/VIEWS:  XR CHEST PORTABLE      FINDINGS:    Heart at upper limit of normal in size. The lungs are clear. No pneumothorax or pleural effusion. Osseous structures appear within normal limits for patient age. Impression  No acute cardiopulmonary disease. Workstation performed: [id number]    Results for orders placed in visit on 07/11/17    XR chest pa & lateral    Narrative  CHEST 2 View    INDICATION:  Edema. History of atrial fibrillation. Former smoker. COMPARISON:  None    VIEWS:  PA and lateral projections; 3 images    FINDINGS:    Mild cardiomegaly. The lungs are clear. No pneumothorax or pleural effusion. Visualized osseous structures appear within normal limits for the patient's age. Impression  Mild cardiomegaly. No active pulmonary disease.       Workstation performed: [id number]      *I have reviewed all imaging reports listed above      Recent Cultures (last 7 days):     Results from last 7 days   Lab Units 08/22/23  1840 08/21/23  1546   BLOOD CULTURE   --  No Growth at 48 hrs. No Growth at 48 hrs. GRAM STAIN RESULT  No Polys or Bacteria seen  --    WOUND CULTURE  Culture too young- will reincubate  --        Last 24 Hours Medication List:   Current Facility-Administered Medications   Medication Dose Route Frequency Provider Last Rate   • acetaminophen  650 mg Oral Q4H PRN Samia Peck PA-C     • allopurinol  100 mg Oral Daily Samia Peck PA-C     • bacitracin  1 large application Topical BID Dakeesha Lisa PA-C     • DULoxetine  20 mg Oral Daily Samia Peck PA-C     • furosemide  40 mg Intravenous BID (diuretic) Samia Peck PA-C     • gabapentin  100 mg Oral TID Samia Peck PA-C     • levothyroxine  125 mcg Oral Early Morning Samia Peck PA-C     • linezolid  300 mg Oral Q12H [facility] Ronaldo Lucas MD     • midodrine  2.5 mg Oral TID AC Ashkan Lisa PA-C     • ondansetron  4 mg Intravenous Q6H PRN Samia Peck PA-C     • triamcinolone   Topical BID Ashkan Lisa PA-C     • vancomycin oral (capsules or solution)  125 mg Oral Q12H [facility] Anjelica Morales PA-C     • warfarin  2 mg Oral Once per day on Sun Mon Wed Fri Sat Samia Peck PA-C     • warfarin  4 mg Oral Once per day on Tue Thu Samia Peck PA-C          Today, Patient Was Seen By: Amy Chadwick MD    ** Please Note: Dictation voice to text software may have been used in the creation of this document.  **

## 2023-08-24 NOTE — PLAN OF CARE
Problem: Prexisting or High Potential for Compromised Skin Integrity  Goal: Skin integrity is maintained or improved  Description: INTERVENTIONS:  - Identify patients at risk for skin breakdown  - Assess and monitor skin integrity  - Assess and monitor nutrition and hydration status  - Monitor labs   - Assess for incontinence   - Turn and reposition patient  - Assist with mobility/ambulation  - Relieve pressure over bony prominences  - Avoid friction and shearing  - Provide appropriate hygiene as needed including keeping skin clean and dry  - Evaluate need for skin moisturizer/barrier cream  - Collaborate with interdisciplinary team   - Patient/family teaching  - Consider wound care consult   Outcome: Progressing   Wound care done

## 2023-08-24 NOTE — ASSESSMENT & PLAN NOTE
Patient receives home therapy services through [facility]

## 2023-08-24 NOTE — ASSESSMENT & PLAN NOTE
· Continue Coumadin for anticoagulation  · Rate is currently controlled  · Monitor PT-INR - currently therapeutic

## 2023-08-24 NOTE — ASSESSMENT & PLAN NOTE
Patient with recurrent abdominal/right truncal infection. Was recently hospitalized August 5 - August 10 for rash with shingles. She was discharged home on Valtrex, linezolid, oral vancomycin prophylaxis. Patient has high risk of frequent reinfections due to obesity  · History of MRSA  · apprec ID - > transitioned to linezolid.  Continue IV diuresis  · Wound care consult

## 2023-08-24 NOTE — PROGRESS NOTES
Progress Note - Infectious Disease   Crystal Diaz 68 y.o. female MRN: [id number]  Unit/Bed#: -01 Encounter: [id number]      Impression/Plan:  1. Right back, buttock cellulitis and right-sided panniculitis. Patient was seen earlier this month for similar issue and this was thought to be a secondary cellulitis postviral process. She completed antibiotic on 8/16 but had reports of continued drainage in the skin and I suspect ongoing maceration leading now to recurrent cellulitis. White count has improved. Exam improving compared to prior images. Patient reported being on and off diuresis due to blood pressures at home which may have contributed to recurrence. There is also question of dermatitis along the back. Continue oral linezolid 300 mg every 12 hours given #2, 3 and 4  Continue oral vancomycin prophylaxis for #5  Continue to trend fever curve/vitals  Repeat CBC and chemistry tomorrow  Monitor skin exam  Monitor for tolerance antibiotic  Ongoing follow-up by wound care  Continue topical steroids for the back  Continue aggressive diuresis  Plan for total 7 days of Linezolid through 8/27  Continue oral vancomycin prophylaxis through 8/30  Additional supportive care as per primary  Would consider rehab if patient unable to manage wound care needs at home.     2. Morbid obesity. Patient with BMI of 77. Unfortunately this limits antibiotic delivery and makes dosing of vancomycin also difficult. Continue linezolid as above  Monitor for tolerance  Supportive care per primary     3. Antibiotic allergies. Noted with severe allergies to penicillins and lower generation cephalosporins. Has tolerated cefepime. Unfortunately this limits antibiotic options. Continue linezolid as above  Monitor for tolerance  We will adjust allergy list as needed     4.  Reduced EGFR. Patient's EGFR noted to be less than 60. This does affect linezolid dosing. Dose reduction as above.   We will further dose adjust as needed     5. History of C. Difficile. No active diarrhea currently. We will continue prophylaxis as above while on systemic antibiotic. Monitor abdominal exam and stool output otherwise.     Above plan discussed in detail with the patient    ID consult service will continue to follow. Antibiotics:  Linezolid 2  Total antibiotic 4  Vancomycin prophylaxis ongoing    24 Hour events:  Yesterday and overnight notes reviewed and no acute events noted    Subjective:  Patient currently denies having any nausea, vomiting, chest pain or shortness of breath. Tolerating current antibiotic without issue. Reporting overall improvement in the drainage from her right side pannus. Still having significant drainage from the left. Objective:  Vitals:  Temp:  [97.6 °F (36.4 °C)-97.9 °F (36.6 °C)] 97.9 °F (36.6 °C)  HR:  [66-77] 66  Resp:  [18-19] 18  BP: ()/(44-61) 119/58  SpO2:  [96 %-99 %] 99 %  Temp (24hrs), Av.7 °F (36.5 °C), Min:97.6 °F (36.4 °C), Max:97.9 °F (36.6 °C)  Current: Temperature: 97.9 °F (36.6 °C)    Physical Exam:   General Appearance:  Alert, interactive, nontoxic, no acute distress. Throat: Oropharynx moist without lesions. Lungs:    Diminished breath sounds throughout, no wheezes or rales on nasal cannula. Heart:  RRR; no murmur, rub or gallop noted   Abdomen:   Soft, non-tender, non-distended, positive bowel sounds. Extremities: No clubbing, cyanosis or edema; changes of lymphedema in the legs bilaterally   Skin: No new rashes or lesions. No draining wounds noted. Overall improvement of skin changes noted in the right pannus as well as the left. No tracking cellulitic changes of the skin folds. Labs, Imaging, & Other studies:   All pertinent labs and imaging studies in PACS were personally reviewed as below.   Results from last 7 days   Lab Units 23  0436 23  0520 23  0525   WBC Thousand/uL 7.94 9.37 12.16*   HEMOGLOBIN g/dL 9.5* 9.1* 9.4* PLATELETS Thousands/uL 521* 493* 513*     Results from last 7 days   Lab Units 08/24/23  0436 08/22/23  0525 08/21/23  1546   POTASSIUM mmol/L 4.4   < > 4.7   CHLORIDE mmol/L 98   < > 97   CO2 mmol/L 31   < > 29   BUN mg/dL 21   < > 21   CREATININE mg/dL 1.13   < > 1.12   EGFR ml/min/1.73sq m 48   < > 49   CALCIUM mg/dL 8.4   < > 8.7   AST U/L 16  --  26   ALT U/L 10  --  14   ALK PHOS U/L 100  --  109*    < > = values in this interval not displayed. Results from last 7 days   Lab Units 08/22/23  1840 08/21/23  1546   BLOOD CULTURE   --  No Growth at 48 hrs. No Growth at 48 hrs. GRAM STAIN RESULT  No Polys or Bacteria seen  --    WOUND CULTURE  Culture results to follow. --        Lab interpretation/comments: No leukocytosis. Creatinine 1.1. Imaging interpretation/comments: No new images    Culture data: Blood cultures without growth and wound cultures pending.

## 2023-08-25 LAB
ALBUMIN SERPL BCP-MCNC: 2.4 G/DL (ref 3.5–5)
ALP SERPL-CCNC: 90 U/L (ref 34–104)
ALT SERPL W P-5'-P-CCNC: 10 U/L (ref 7–52)
ANION GAP SERPL CALCULATED.3IONS-SCNC: 5 MMOL/L
AST SERPL W P-5'-P-CCNC: 28 U/L (ref 13–39)
BASOPHILS # BLD AUTO: 0.05 THOUSANDS/ÂΜL (ref 0–0.1)
BASOPHILS NFR BLD AUTO: 1 % (ref 0–1)
BILIRUB SERPL-MCNC: 0.28 MG/DL (ref 0.2–1)
BUN SERPL-MCNC: 25 MG/DL (ref 5–25)
CALCIUM ALBUM COR SERPL-MCNC: 9.5 MG/DL (ref 8.3–10.1)
CALCIUM SERPL-MCNC: 8.2 MG/DL (ref 8.4–10.2)
CHLORIDE SERPL-SCNC: 98 MMOL/L (ref 96–108)
CO2 SERPL-SCNC: 32 MMOL/L (ref 21–32)
CREAT SERPL-MCNC: 1.32 MG/DL (ref 0.6–1.3)
EOSINOPHIL # BLD AUTO: 0.3 THOUSAND/ÂΜL (ref 0–0.61)
EOSINOPHIL NFR BLD AUTO: 3 % (ref 0–6)
ERYTHROCYTE [DISTWIDTH] IN BLOOD BY AUTOMATED COUNT: 17.5 % (ref 11.6–15.1)
GFR SERPL CREATININE-BSD FRML MDRD: 40 ML/MIN/1.73SQ M
GLUCOSE SERPL-MCNC: 102 MG/DL (ref 65–140)
HCT VFR BLD AUTO: 33.4 % (ref 34.8–46.1)
HGB BLD-MCNC: 9.5 G/DL (ref 11.5–15.4)
IMM GRANULOCYTES # BLD AUTO: 0.04 THOUSAND/UL (ref 0–0.2)
IMM GRANULOCYTES NFR BLD AUTO: 0 % (ref 0–2)
INR PPP: 1.98 (ref 0.84–1.19)
LYMPHOCYTES # BLD AUTO: 0.95 THOUSANDS/ÂΜL (ref 0.6–4.47)
LYMPHOCYTES NFR BLD AUTO: 10 % (ref 14–44)
MCH RBC QN AUTO: 24.5 PG (ref 26.8–34.3)
MCHC RBC AUTO-ENTMCNC: 28.4 G/DL (ref 31.4–37.4)
MCV RBC AUTO: 86 FL (ref 82–98)
MONOCYTES # BLD AUTO: 0.54 THOUSAND/ÂΜL (ref 0.17–1.22)
MONOCYTES NFR BLD AUTO: 6 % (ref 4–12)
NEUTROPHILS # BLD AUTO: 7.52 THOUSANDS/ÂΜL (ref 1.85–7.62)
NEUTS SEG NFR BLD AUTO: 80 % (ref 43–75)
NRBC BLD AUTO-RTO: 0 /100 WBCS
PLATELET # BLD AUTO: 452 THOUSANDS/UL (ref 149–390)
PMV BLD AUTO: 8.7 FL (ref 8.9–12.7)
POTASSIUM SERPL-SCNC: 4.7 MMOL/L (ref 3.5–5.3)
PROT SERPL-MCNC: 7 G/DL (ref 6.4–8.4)
PROTHROMBIN TIME: 22.5 SECONDS (ref 11.6–14.5)
RBC # BLD AUTO: 3.88 MILLION/UL (ref 3.81–5.12)
SODIUM SERPL-SCNC: 135 MMOL/L (ref 135–147)
WBC # BLD AUTO: 9.4 THOUSAND/UL (ref 4.31–10.16)

## 2023-08-25 PROCEDURE — 99232 SBSQ HOSP IP/OBS MODERATE 35: CPT | Performed by: INTERNAL MEDICINE

## 2023-08-25 PROCEDURE — 85025 COMPLETE CBC W/AUTO DIFF WBC: CPT | Performed by: HOSPITALIST

## 2023-08-25 PROCEDURE — 85610 PROTHROMBIN TIME: CPT

## 2023-08-25 PROCEDURE — 99232 SBSQ HOSP IP/OBS MODERATE 35: CPT | Performed by: NURSE PRACTITIONER

## 2023-08-25 PROCEDURE — 80053 COMPREHEN METABOLIC PANEL: CPT | Performed by: HOSPITALIST

## 2023-08-25 RX ADMIN — GABAPENTIN 100 MG: 100 CAPSULE ORAL at 17:24

## 2023-08-25 RX ADMIN — ALLOPURINOL 100 MG: 100 TABLET ORAL at 08:23

## 2023-08-25 RX ADMIN — LINEZOLID 300 MG: 600 TABLET, FILM COATED ORAL at 20:48

## 2023-08-25 RX ADMIN — LINEZOLID 300 MG: 600 TABLET, FILM COATED ORAL at 08:24

## 2023-08-25 RX ADMIN — VANCOMYCIN HYDROCHLORIDE 125 MG: 125 CAPSULE ORAL at 08:23

## 2023-08-25 RX ADMIN — WARFARIN SODIUM 2 MG: 2 TABLET ORAL at 17:24

## 2023-08-25 RX ADMIN — MIDODRINE HYDROCHLORIDE 2.5 MG: 5 TABLET ORAL at 06:15

## 2023-08-25 RX ADMIN — LEVOTHYROXINE SODIUM 125 MCG: 25 TABLET ORAL at 06:15

## 2023-08-25 RX ADMIN — BACITRACIN 1 LARGE APPLICATION: 500 OINTMENT TOPICAL at 20:00

## 2023-08-25 RX ADMIN — GABAPENTIN 100 MG: 100 CAPSULE ORAL at 20:47

## 2023-08-25 RX ADMIN — MIDODRINE HYDROCHLORIDE 2.5 MG: 5 TABLET ORAL at 17:23

## 2023-08-25 RX ADMIN — VANCOMYCIN HYDROCHLORIDE 125 MG: 125 CAPSULE ORAL at 20:47

## 2023-08-25 RX ADMIN — FUROSEMIDE 40 MG: 10 INJECTION, SOLUTION INTRAMUSCULAR; INTRAVENOUS at 08:23

## 2023-08-25 RX ADMIN — TRIAMCINOLONE ACETONIDE: 1 CREAM TOPICAL at 08:25

## 2023-08-25 RX ADMIN — BACITRACIN 1 LARGE APPLICATION: 500 OINTMENT TOPICAL at 08:25

## 2023-08-25 RX ADMIN — MIDODRINE HYDROCHLORIDE 2.5 MG: 5 TABLET ORAL at 13:03

## 2023-08-25 RX ADMIN — DULOXETINE HYDROCHLORIDE 20 MG: 20 CAPSULE, DELAYED RELEASE ORAL at 08:23

## 2023-08-25 RX ADMIN — GABAPENTIN 100 MG: 100 CAPSULE ORAL at 08:23

## 2023-08-25 NOTE — PROGRESS NOTES
[address]  Progress Note  Name: Ronnell Mckeon  MRN: [id number]  Unit/Bed#: -11 I Date of Admission: 8/21/2023   Date of Service: 8/25/2023 I Hospital Day: 4    Assessment/Plan   * Panniculitis  Assessment & Plan  Patient with recurrent abdominal/right truncal infection. Was recently hospitalized August 5 - August 10 for rash with shingles. She was discharged home on Valtrex, linezolid, oral vancomycin prophylaxis. Patient has high risk of frequent reinfections due to obesity  · History of MRSA  · apprec ID - > transitioned to linezolid. Continue IV diuresis  · Wound care consult    Chronic atrial fibrillation (HCC)  Assessment & Plan  · Continue Coumadin for anticoagulation  · Rate is currently controlled    Lab Results   Component Value Date    INR 1.98 (H) 08/25/2023    INR 2.11 (H) 08/24/2023         History of Clostridioides difficile infection  Assessment & Plan  Reports 3 yellow/loose stools prior to coming to ED - check C. Diff  · Patient given clindamycin in ED - Will order prophylaxis vanco  · Has not had bowel movement , continue with vancomycin and collect sample when able to    Ambulatory dysfunction  Assessment & Plan  Patient receives home therapy services through [facility]    Chronic diastolic congestive heart failure [facility])  Assessment & Plan  Wt Readings from Last 3 Encounters:   08/24/23 (!) 200 kg (439 lb 15.9 oz)   08/10/23 (!) 200 kg (441 lb 9.3 oz)   06/12/23 (!) 199 kg (438 lb 7.9 oz)     · Continue Lasix with low dose midodrine to allow adequate blood pressure for IV diuresis.   · Monitor fluid status  · I&Os    Class 3 severe obesity due to excess calories with serious comorbidity and body mass index (BMI) greater than or equal to 70 in adult [facility])  Assessment & Plan  BMI 77  · Healthy diet lifestyle modification recommended  · Consider referral to bariatric services           VTE Pharmacologic Prophylaxis: VTE Score: 9 High Risk (Score >/= 5) - Pharmacological DVT Prophylaxis Ordered: warfarin (Coumadin). Sequential Compression Devices Ordered. Patient Centered Rounds: I performed bedside rounds with nursing staff today. Discussions with Specialists or Other Care Team Provider: Reviewed previous providers notes discussed with case management primary RN    Education and Discussions with Family / Patient: Discussed plan of care with patient denies any additional questions or concerns at this time    Total Time Spent on Date of Encounter in care of patient: 35 minutes This time was spent on one or more of the following: performing physical exam; counseling and coordination of care; obtaining or reviewing history; documenting in the medical record; reviewing/ordering tests, medications or procedures; communicating with other healthcare professionals and discussing with patient's family/caregivers. Current Length of Stay: 4 day(s)  Current Patient Status: Inpatient   Certification Statement: The patient will continue to require additional inpatient hospital stay due to Antibiotics wound care IV antibiotics  Discharge Plan: Anticipate discharge in 24-48 hrs to home. Code Status: Level 1 - Full Code    Subjective:   Lying in bed resting comfortably significantly improved redness swelling still having significant drainage from left-sided abdomen    Objective:     Vitals:   Temp (24hrs), Av °F (36.7 °C), Min:97.5 °F (36.4 °C), Max:98.7 °F (37.1 °C)    Temp:  [97.5 °F (36.4 °C)-98.7 °F (37.1 °C)] 97.5 °F (36.4 °C)  HR:  [65-74] 67  Resp:  [17-19] 18  BP: ()/(45-59) 110/56  SpO2:  [93 %-100 %] 98 %  Body mass index is 77.96 kg/m². Input and Output Summary (last 24 hours): Intake/Output Summary (Last 24 hours) at 2023 1032  Last data filed at 2023 3422  Gross per 24 hour   Intake 1240 ml   Output 2100 ml   Net -860 ml       Physical Exam:   Physical Exam  Vitals reviewed. Cardiovascular:      Rate and Rhythm: Normal rate. Pulmonary:      Effort: Respiratory distress present. Abdominal:      Palpations: Abdomen is soft. Skin:     General: Skin is warm. Neurological:      Mental Status: She is alert. Mental status is at baseline. Psychiatric:         Mood and Affect: Mood normal.         Additional Data:     Labs:  Results from last 7 days   Lab Units 08/25/23  0454   WBC Thousand/uL 9.40   HEMOGLOBIN g/dL 9.5*   HEMATOCRIT % 33.4*   PLATELETS Thousands/uL 452*   NEUTROS PCT % 80*   LYMPHS PCT % 10*   MONOS PCT % 6   EOS PCT % 3     Results from last 7 days   Lab Units 08/25/23  0454   SODIUM mmol/L 135   POTASSIUM mmol/L 4.7   CHLORIDE mmol/L 98   CO2 mmol/L 32   BUN mg/dL 25   CREATININE mg/dL 1.32*   ANION GAP mmol/L 5   CALCIUM mg/dL 8.2*   ALBUMIN g/dL 2.4*   TOTAL BILIRUBIN mg/dL 0.28   ALK PHOS U/L 90   ALT U/L 10   AST U/L 28   GLUCOSE RANDOM mg/dL 102     Results from last 7 days   Lab Units 08/25/23  0454   INR  1.98*             Results from last 7 days   Lab Units 08/23/23  0520 08/22/23  0525 08/21/23  1546   LACTIC ACID mmol/L  --   --  1.2   PROCALCITONIN ng/ml 0.14 0.20 0.14       Lines/Drains:  Invasive Devices     Peripheral Intravenous Line  Duration           Peripheral IV 08/25/23 Distal;Dorsal (posterior); Left Forearm <1 day          Drain  Duration           External Urinary Catheter 1 day                      Imaging: No pertinent imaging reviewed. Recent Cultures (last 7 days):   Results from last 7 days   Lab Units 08/22/23  1840 08/21/23  1546   BLOOD CULTURE   --  No Growth at 72 hrs. No Growth at 72 hrs. GRAM STAIN RESULT  No Polys or Bacteria seen  --    WOUND CULTURE  Culture results to follow.   --        Last 24 Hours Medication List:   Current Facility-Administered Medications   Medication Dose Route Frequency Provider Last Rate   • acetaminophen  650 mg Oral Q4H PRN Sameera Guzmán PA-C     • allopurinol  100 mg Oral Daily Sameera Guzmán PA-C     • bacitracin  1 large application Topical BID David Nelson [place] MYA Rachel     • DULoxetine  20 mg Oral Daily Maureen Bishop     • furosemide  40 mg Intravenous BID (diuretic) Chapito Nielsen PA-C     • gabapentin  100 mg Oral TID Chapito Nielsen PA-C     • levothyroxine  125 mcg Oral Early Morning Chapito Nielsen PA-C     • linezolid  300 mg Oral Q12H [address]  Elizabeth Smith MD     • midodrine  2.5 mg Oral TID AC Michelsheyla Bates PA-C     • ondansetron  4 mg Intravenous Q6H PRN Chapito Nielsen PA-C     • triamcinolone   Topical BID David Swann PA-C     • vancomycin oral (capsules or solution)  125 mg Oral Q12H Myke Goel MD     • warfarin  2 mg Oral Once per day on Sun Mon Wed Fri Sat Chapito Nielsen PA-C     • warfarin  4 mg Oral Once per day on Tue Thu Chapito Nielsen PA-C          Today, Patient Was Seen By: ORACIO Michelle    **Please Note: This note may have been constructed using a voice recognition system. **

## 2023-08-25 NOTE — CASE MANAGEMENT
Case Management Discharge Planning Note    Patient name Jessica Sharma  Location [address] [facility] [place] [id number] MRN [id number]  : 1950 Date 2023       Current Admission Date: 2023  Current Admission Diagnosis:Panniculitis   Patient Active Problem List    Diagnosis Date Noted   • Shingles 2023   • Cellulitis 2023   • Chronic atrial fibrillation ([address]) 2022   • History of Clostridioides difficile infection 2022   • Lymphedema of extremity 2021   • Other specified hypothyroidism 10/03/2020   • Mild episode of recurrent major depressive disorder ([address]) 10/03/2020   • Idiopathic chronic 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 10/03/2020   • Primary osteoarthritis involving multiple joints 10/03/2020   • Class 3 severe obesity due to excess calories with serious comorbidity and body mass index (BMI) greater than or equal to 70 in adult [facility]) 10/03/2020   • Chronic diastolic congestive heart failure ([address]) 10/03/2020   • Panniculitis 10/03/2020   • Gastroesophageal reflux disease without esophagitis 10/03/2020   • Hx MRSA infection 2020   • Impaired mobility 2019   • Osteoarthritis of glenohumeral joint 2019   • Left ovarian cyst 2017   • Depression with anxiety 07/15/2017   • Anemia 2016   • Ambulatory dysfunction 2016   • Adjustment disorder 2013   • Irritable bowel syndrome 2012   • Left atrial enlargement 2012   • Left ventricular hypertrophy 2012   • Carpal tunnel syndrome 2012   • Gout 2012   • Hyperlipidemia 2012   • Symptomatic menopausal or female climacteric states 2012      LOS (days): 4  Geometric Mean LOS (GMLOS) (days): 2.90  Days to GMLOS:-0.7     OBJECTIVE:  Risk of Unplanned Readmission Score: 26.04         Current admission status: Inpatient   Preferred Pharmacy:   [address] [address]  Phone: [phone] Fax: [phone] [address] [address]  Phone: [phone] Fax: [phone]    Primary Care Provider: Hilary Maguire MD    Primary Insurance: MEDICARE  Secondary Insurance: A[employer]    DISCHARGE DETAILS:    Discharge planning discussed with[de-identified] Patient  Freedom of Choice: Yes        Additional Comments: CM continuing to follow for discharge planning. Discharge plan remains for pt to reutrn home with caregiver and [facility] when stable. Pt will require S transport home. Anticipate discharge early next week per SLIM.

## 2023-08-25 NOTE — PLAN OF CARE
Problem: Prexisting or High Potential for Compromised Skin Integrity  Goal: Skin integrity is maintained or improved  Description: INTERVENTIONS:  - Identify patients at risk for skin breakdown  - Assess and monitor skin integrity  - Assess and monitor nutrition and hydration status  - Monitor labs   - Assess for incontinence   - Turn and reposition patient  - Assist with mobility/ambulation  - Relieve pressure over bony prominences  - Avoid friction and shearing  - Provide appropriate hygiene as needed including keeping skin clean and dry  - Evaluate need for skin moisturizer/barrier cream  - Collaborate with interdisciplinary team   - Patient/family teaching  - Consider wound care consult   Outcome: Progressing     Problem: GENITOURINARY - ADULT  Goal: Maintains or returns to baseline urinary function  Description: INTERVENTIONS:  - Assess urinary function  - Encourage oral fluids to ensure adequate hydration if ordered  - Administer IV fluids as ordered to ensure adequate hydration  - Administer ordered medications as needed  - Offer frequent toileting  - Follow urinary retention protocol if ordered  Outcome: Progressing  Goal: Absence of urinary retention  Description: INTERVENTIONS:  - Assess patient’s ability to void and empty bladder  - Monitor I/O  - Bladder scan as needed  - Discuss with physician/AP medications to alleviate retention as needed  - Discuss catheterization for long term situations as appropriate  Outcome: Progressing

## 2023-08-25 NOTE — ASSESSMENT & PLAN NOTE
Wt Readings from Last 3 Encounters:   08/24/23 (!) 200 kg (439 lb 15.9 oz)   08/10/23 (!) 200 kg (441 lb 9.3 oz)   06/12/23 (!) 199 kg (438 lb 7.9 oz)     · Continue Lasix with low dose midodrine to allow adequate blood pressure for IV diuresis.   · Monitor fluid status  · I&Os

## 2023-08-25 NOTE — ASSESSMENT & PLAN NOTE
Reports 3 yellow/loose stools prior to coming to ED - check C.  Diff  · Patient given clindamycin in ED - Will order prophylaxis vanco  · Has not had bowel movement , continue with vancomycin and collect sample when able to

## 2023-08-25 NOTE — ASSESSMENT & PLAN NOTE
· Continue Coumadin for anticoagulation  · Rate is currently controlled    Lab Results   Component Value Date    INR 1.98 (H) 08/25/2023    INR 2.11 (H) 08/24/2023

## 2023-08-25 NOTE — PROGRESS NOTES
Progress Note - Infectious Disease   Sherrie Gordon 68 y.o. female MRN: [id number]  Unit/Bed#: -01 Encounter: [id number]      Impression/Plan:  1.  Right back, buttock cellulitis and right-sided panniculitis.  Patient was seen earlier this month for similar issue and this was thought to be a secondary cellulitis from a viral process.  She completed antibiotic on 8/16 but had reports of continued drainage in the skin and I suspect ongoing maceration leading now to recurrent cellulitis.  White count has improved.  Exam improving compared to prior images.  Patient reported being on and off diuresis due to blood pressures at home which may have contributed to recurrence. Yazmin Dominguez is also question of dermatitis along the back. Continue oral linezolid 300 mg every 12 hours given #2, 3 and 4  Continue oral vancomycin prophylaxis for #5  Continue to trend fever curve/vitals  Repeat CBC and chemistry tomorrow  Monitor skin exam  Monitor for tolerance antibiotic  Ongoing follow-up by wound care  Continue topical steroids for the back  Continue aggressive diuresis  Plan for total 7 days of Linezolid through 8/27  Continue oral vancomycin prophylaxis through 8/30  Last doses of antibiotics ordered  Additional supportive care/discharge as per primary  Would consider rehab if patient unable to manage wound care needs at home.     2.  Morbid obesity.  Patient with BMI of 77.  Unfortunately this limits antibiotic delivery and makes dosing of vancomycin also difficult. Continue linezolid as above  Monitor for tolerance  Supportive care per primary     3.  Antibiotic allergies.  Noted with severe allergies to penicillins and lower generation cephalosporins.  Has tolerated cefepime.  Unfortunately this limits antibiotic options.   Continue linezolid as above  Monitor for tolerance  We will adjust allergy list as needed     4.  Reduced EGFR.  Patient's EGFR noted to be less than 60.  This does affect linezolid dosing. Ciera López reduction as above.  We will further dose adjust as needed     5.  History of C. Difficile.  No active diarrhea currently. Bettyjane Heimlich will continue prophylaxis as above while on systemic antibiotic.  Monitor abdominal exam and stool output otherwise.     Above plan discussed in detail with the patient and with primary service    ID consult service will reevaluate this patient again on Monday, if admitted. Please contact ID attending on call if questions in the interim.     Antibiotics:  Linezolid 3  Total antibiotic 5  Vancomycin prophylaxis ongoing    24 Hour events:  Yesterday and overnight notes reviewed and no acute events noted    Subjective:  Currently denies having any nausea, vomiting, chest pain or shortness of breath. Tolerating current antibiotic without itching or rash. She has noticed improvement in the drainage particularly on the right compared to the left. Reviewed duration of therapy. Objective:  Vitals:  Temp:  [97.5 °F (36.4 °C)-98.7 °F (37.1 °C)] 97.5 °F (36.4 °C)  HR:  [65-74] 67  Resp:  [17-19] 18  BP: ()/(45-59) 110/56  SpO2:  [93 %-100 %] 98 %  Temp (24hrs), Av °F (36.7 °C), Min:97.5 °F (36.4 °C), Max:98.7 °F (37.1 °C)  Current: Temperature: 97.5 °F (36.4 °C)    Physical Exam:   General Appearance:  Alert, interactive, nontoxic, no acute distress. Throat: Oropharynx moist without lesions. Lungs:    Decreased breath sounds throughout   Heart:  RRR; no murmur, rub or gallop noted   Abdomen:   Soft, non-tender, non-distended, positive bowel sounds. Extremities: No clubbing, cyanosis or edema; changes of lymphedema in the lower legs   Skin: No new rashes or lesions. No new draining wounds noted. Patient has areas now of settling edema and erythema involving the upper back and folds and along the pannus. There is no significant warmth appreciated. Dressing change earlier this morning and remain dry currently.        Labs, Imaging, & Other studies:   All pertinent labs and imaging studies in PACS were personally reviewed as below. Results from last 7 days   Lab Units 08/25/23  0454 08/24/23  0436 08/23/23  0520   WBC Thousand/uL 9.40 7.94 9.37   HEMOGLOBIN g/dL 9.5* 9.5* 9.1*   PLATELETS Thousands/uL 452* 521* 493*     Results from last 7 days   Lab Units 08/25/23  0454 08/24/23  0436 08/22/23  0525 08/21/23  1546   POTASSIUM mmol/L 4.7 4.4   < > 4.7   CHLORIDE mmol/L 98 98   < > 97   CO2 mmol/L 32 31   < > 29   BUN mg/dL 25 21   < > 21   CREATININE mg/dL 1.32* 1.13   < > 1.12   EGFR ml/min/1.73sq m 40 48   < > 49   CALCIUM mg/dL 8.2* 8.4   < > 8.7   AST U/L 28 16  --  26   ALT U/L 10 10  --  14   ALK PHOS U/L 90 100  --  109*    < > = values in this interval not displayed. Results from last 7 days   Lab Units 08/22/23  1840 08/21/23  1546   BLOOD CULTURE   --  No Growth at 72 hrs. No Growth at 72 hrs. GRAM STAIN RESULT  No Polys or Bacteria seen  --    WOUND CULTURE  Culture results to follow. --        Lab interpretation/comments: No leukocyte. Platelets downtrending. Creatinine mildly evaded. Imaging interpretation/comments: No new images    Culture data: Current wound cultures pending.

## 2023-08-26 LAB
ANION GAP SERPL CALCULATED.3IONS-SCNC: 6 MMOL/L
BACTERIA WND AEROBE CULT: ABNORMAL
BACTERIA WND AEROBE CULT: ABNORMAL
BUN SERPL-MCNC: 26 MG/DL (ref 5–25)
CALCIUM SERPL-MCNC: 8.4 MG/DL (ref 8.4–10.2)
CHLORIDE SERPL-SCNC: 98 MMOL/L (ref 96–108)
CO2 SERPL-SCNC: 33 MMOL/L (ref 21–32)
CREAT SERPL-MCNC: 1.36 MG/DL (ref 0.6–1.3)
ERYTHROCYTE [DISTWIDTH] IN BLOOD BY AUTOMATED COUNT: 17.3 % (ref 11.6–15.1)
GFR SERPL CREATININE-BSD FRML MDRD: 38 ML/MIN/1.73SQ M
GLUCOSE SERPL-MCNC: 102 MG/DL (ref 65–140)
GRAM STN SPEC: ABNORMAL
HCT VFR BLD AUTO: 32.8 % (ref 34.8–46.1)
HGB BLD-MCNC: 9.1 G/DL (ref 11.5–15.4)
INR PPP: 2.16 (ref 0.84–1.19)
MCH RBC QN AUTO: 24.1 PG (ref 26.8–34.3)
MCHC RBC AUTO-ENTMCNC: 27.7 G/DL (ref 31.4–37.4)
MCV RBC AUTO: 87 FL (ref 82–98)
PLATELET # BLD AUTO: 520 THOUSANDS/UL (ref 149–390)
PMV BLD AUTO: 8 FL (ref 8.9–12.7)
POTASSIUM SERPL-SCNC: 3.8 MMOL/L (ref 3.5–5.3)
PROTHROMBIN TIME: 24.1 SECONDS (ref 11.6–14.5)
RBC # BLD AUTO: 3.77 MILLION/UL (ref 3.81–5.12)
SODIUM SERPL-SCNC: 137 MMOL/L (ref 135–147)
WBC # BLD AUTO: 10.28 THOUSAND/UL (ref 4.31–10.16)

## 2023-08-26 PROCEDURE — 85610 PROTHROMBIN TIME: CPT

## 2023-08-26 PROCEDURE — 85027 COMPLETE CBC AUTOMATED: CPT | Performed by: NURSE PRACTITIONER

## 2023-08-26 PROCEDURE — 80048 BASIC METABOLIC PNL TOTAL CA: CPT | Performed by: NURSE PRACTITIONER

## 2023-08-26 RX ORDER — FUROSEMIDE 40 MG/1
40 TABLET ORAL DAILY
Status: DISCONTINUED | OUTPATIENT
Start: 2023-08-27 | End: 2023-08-28 | Stop reason: HOSPADM

## 2023-08-26 RX ADMIN — ALLOPURINOL 100 MG: 100 TABLET ORAL at 10:14

## 2023-08-26 RX ADMIN — VANCOMYCIN HYDROCHLORIDE 125 MG: 125 CAPSULE ORAL at 22:00

## 2023-08-26 RX ADMIN — BACITRACIN 1 LARGE APPLICATION: 500 OINTMENT TOPICAL at 17:35

## 2023-08-26 RX ADMIN — VANCOMYCIN HYDROCHLORIDE 125 MG: 125 CAPSULE ORAL at 10:14

## 2023-08-26 RX ADMIN — GABAPENTIN 100 MG: 100 CAPSULE ORAL at 22:00

## 2023-08-26 RX ADMIN — LEVOTHYROXINE SODIUM 125 MCG: 25 TABLET ORAL at 05:29

## 2023-08-26 RX ADMIN — DULOXETINE HYDROCHLORIDE 20 MG: 20 CAPSULE, DELAYED RELEASE ORAL at 10:14

## 2023-08-26 RX ADMIN — ACETAMINOPHEN 650 MG: 325 TABLET ORAL at 17:38

## 2023-08-26 RX ADMIN — FUROSEMIDE 40 MG: 10 INJECTION, SOLUTION INTRAMUSCULAR; INTRAVENOUS at 10:14

## 2023-08-26 RX ADMIN — GABAPENTIN 100 MG: 100 CAPSULE ORAL at 10:14

## 2023-08-26 RX ADMIN — GABAPENTIN 100 MG: 100 CAPSULE ORAL at 16:42

## 2023-08-26 RX ADMIN — MIDODRINE HYDROCHLORIDE 2.5 MG: 5 TABLET ORAL at 06:33

## 2023-08-26 RX ADMIN — LINEZOLID 300 MG: 600 TABLET, FILM COATED ORAL at 22:00

## 2023-08-26 RX ADMIN — WARFARIN SODIUM 2 MG: 2 TABLET ORAL at 17:35

## 2023-08-26 RX ADMIN — LINEZOLID 300 MG: 600 TABLET, FILM COATED ORAL at 10:14

## 2023-08-26 RX ADMIN — MIDODRINE HYDROCHLORIDE 2.5 MG: 5 TABLET ORAL at 11:54

## 2023-08-26 RX ADMIN — BACITRACIN 1 LARGE APPLICATION: 500 OINTMENT TOPICAL at 10:13

## 2023-08-26 RX ADMIN — MIDODRINE HYDROCHLORIDE 2.5 MG: 5 TABLET ORAL at 16:42

## 2023-08-26 RX ADMIN — TRIAMCINOLONE ACETONIDE: 1 CREAM TOPICAL at 10:13

## 2023-08-26 NOTE — NURSING NOTE
AWAKE AND PLEASANT. 02 IS MAINTAINED 2 LITERS N/C AND 02 SAT 96%. HR 73/MIN. DENIES PAIN. ALL WOUND DRESSINGS MAINTAINED TO WOUNDS/FOLD ALL INTACT. CALL BELL NEAR.

## 2023-08-26 NOTE — PLAN OF CARE
Problem: Prexisting or High Potential for Compromised Skin Integrity  Goal: Skin integrity is maintained or improved  Description: INTERVENTIONS:  - Identify patients at risk for skin breakdown  - Assess and monitor skin integrity  - Assess and monitor nutrition and hydration status  - Monitor labs   - Assess for incontinence   - Turn and reposition patient  - Assist with mobility/ambulation  - Relieve pressure over bony prominences  - Avoid friction and shearing  - Provide appropriate hygiene as needed including keeping skin clean and dry  - Evaluate need for skin moisturizer/barrier cream  - Collaborate with interdisciplinary team   - Patient/family teaching  - Consider wound care consult   Outcome: Progressing     Problem: Nutrition/Hydration-ADULT  Goal: Nutrient/Hydration intake appropriate for improving, restoring or maintaining nutritional needs  Description: Monitor and assess patient's nutrition/hydration status for malnutrition. Collaborate with interdisciplinary team and initiate plan and interventions as ordered. Monitor patient's weight and dietary intake as ordered or per policy. Utilize nutrition screening tool and intervene as necessary. Determine patient's food preferences and provide high-protein, high-caloric foods as appropriate.      INTERVENTIONS:  - Monitor oral intake, urinary output, labs, and treatment plans  - Assess nutrition and hydration status and recommend course of action  - Evaluate amount of meals eaten  - Assist patient with eating if necessary   - Allow adequate time for meals  - Recommend/ encourage appropriate diets, oral nutritional supplements, and vitamin/mineral supplements  - Order, calculate, and assess calorie counts as needed  - Recommend, monitor, and adjust tube feedings and TPN/PPN based on assessed needs  - Assess need for intravenous fluids  - Provide specific nutrition/hydration education as appropriate  - Include patient/family/caregiver in decisions related to nutrition  Outcome: Progressing     Problem: RESPIRATORY - ADULT  Goal: Achieves optimal ventilation and oxygenation  Description: INTERVENTIONS:  - Assess for changes in respiratory status  - Assess for changes in mentation and behavior  - Position to facilitate oxygenation and minimize respiratory effort  - Oxygen administered by appropriate delivery if ordered  - Initiate smoking cessation education as indicated  - Encourage broncho-pulmonary hygiene including cough, deep breathe, Incentive Spirometry  - Assess the need for suctioning and aspirate as needed  - Assess and instruct to report SOB or any respiratory difficulty  - Respiratory Therapy support as indicated  Outcome: Progressing     Problem: GENITOURINARY - ADULT  Goal: Maintains or returns to baseline urinary function  Description: INTERVENTIONS:  - Assess urinary function  - Encourage oral fluids to ensure adequate hydration if ordered  - Administer IV fluids as ordered to ensure adequate hydration  - Administer ordered medications as needed  - Offer frequent toileting  - Follow urinary retention protocol if ordered  Outcome: Progressing  Goal: Absence of urinary retention  Description: INTERVENTIONS:  - Assess patient’s ability to void and empty bladder  - Monitor I/O  - Bladder scan as needed  - Discuss with physician/AP medications to alleviate retention as needed  - Discuss catheterization for long term situations as appropriate  Outcome: Progressing     Problem: SKIN/TISSUE INTEGRITY - ADULT  Goal: Skin Integrity remains intact(Skin Breakdown Prevention)  Description: Assess:  -Perform Gucci assessment every shift   -Clean and moisturize skin every shift   -Inspect skin when repositioning, toileting, and assisting with ADLS  -Assess extremities for adequate circulation and sensation     Bed Management:  -Have minimal linens on bed & keep smooth, unwrinkled  -Change linens as needed when moist or perspiring  -Avoid sitting or lying in one position for more than 2 hours while in bed  -Keep HOB at 30 degrees     Toileting:  -Offer bedside commode  -Assess for incontinence every 2 hours    Activity:  -Encourage activity and walks on unit  -Encourage or provide ROM exercises   -Turn and reposition patient every 2 Hours  -Use appropriate equipment to lift or move patient in bed    Skin Care:  -Avoid use of baby powder, tape, friction and shearing, hot water or constrictive clothing  -Do not massage red bony areas    Next Steps:  -Consider consults to  interdisciplinary teams such as wound care  Outcome: Progressing  Goal: Incision(s), wounds(s) or drain site(s) healing without S/S of infection  Description: INTERVENTIONS  - Assess and document dressing, incision, wound bed, drain sites and surrounding tissue  - Provide patient and family education  - Perform skin care/dressing changes every shift  Outcome: Progressing     Problem: MUSCULOSKELETAL - ADULT  Goal: Maintain or return mobility to safest level of function  Description: INTERVENTIONS:  - Assess patient's ability to carry out ADLs; assess patient's baseline for ADL function and identify physical deficits which impact ability to perform ADLs (bathing, care of mouth/teeth, toileting, grooming, dressing, etc.)  - Assess/evaluate cause of self-care deficits   - Assess range of motion  - Assess patient's mobility  - Assess patient's need for assistive devices and provide as appropriate  - Encourage maximum independence but intervene and supervise when necessary  - Involve family in performance of ADLs  - Assess for home care needs following discharge   - Consider OT consult to assist with ADL evaluation and planning for discharge  - Provide patient education as appropriate  Outcome: Progressing     Problem: PAIN - ADULT  Goal: Verbalizes/displays adequate comfort level or baseline comfort level  Description: Interventions:  - Encourage patient to monitor pain and request assistance  - Assess pain using appropriate pain scale  - Administer analgesics based on type and severity of pain and evaluate response  - Implement non-pharmacological measures as appropriate and evaluate response  - Consider cultural and social influences on pain and pain management  - Notify physician/advanced practitioner if interventions unsuccessful or patient reports new pain  Outcome: Progressing     Problem: INFECTION - ADULT  Goal: Absence or prevention of progression during hospitalization  Description: INTERVENTIONS:  - Assess and monitor for signs and symptoms of infection  - Monitor lab/diagnostic results  - Monitor all insertion sites, i.e. indwelling lines, tubes, and drains  - Monitor endotracheal if appropriate and nasal secretions for changes in amount and color  - [place] appropriate cooling/warming therapies per order  - Administer medications as ordered  - Instruct and encourage patient and family to use good hand hygiene technique  - Identify and instruct in appropriate isolation precautions for identified infection/condition  Outcome: Progressing  Goal: Absence of fever/infection during neutropenic period  Description: INTERVENTIONS:  - Monitor WBC    Outcome: Progressing     Problem: SAFETY ADULT  Goal: Patient will remain free of falls  Description: INTERVENTIONS:  - Educate patient/family on patient safety including physical limitations  - Instruct patient to call for assistance with activity   - Consult OT/PT to assist with strengthening/mobility   - Keep Call bell within reach  - Keep bed low and locked with side rails adjusted as appropriate  - Keep care items and personal belongings within reach  - Initiate and maintain comfort rounds  - Make Fall Risk Sign visible to staff  - Offer Toileting every 2 Hours, in advance of need  - Initiate/Maintain bed alarm  - Apply yellow socks and bracelet for high fall risk patients  - Consider moving patient to room near nurses station  Outcome: Progressing  Goal: Maintain or return to baseline ADL function  Description: INTERVENTIONS:  -  Assess patient's ability to carry out ADLs; assess patient's baseline for ADL function and identify physical deficits which impact ability to perform ADLs (bathing, care of mouth/teeth, toileting, grooming, dressing, etc.)  - Assess/evaluate cause of self-care deficits   - Assess range of motion  - Assess patient's mobility; develop plan if impaired  - Assess patient's need for assistive devices and provide as appropriate  - Encourage maximum independence but intervene and supervise when necessary  - Involve family in performance of ADLs  - Assess for home care needs following discharge   - Consider OT consult to assist with ADL evaluation and planning for discharge  - Provide patient education as appropriate  Outcome: Progressing  Goal: Maintains/Returns to pre admission functional level  Description: INTERVENTIONS:  - Perform BMAT or MOVE assessment daily.   - Set and communicate daily mobility goal to care team and patient/family/caregiver. - Collaborate with rehabilitation services on mobility goals if consulted  - Reposition patient every 2 hours.   - Record patient progress and toleration of activity level   Outcome: Progressing     Problem: DISCHARGE PLANNING  Goal: Discharge to home or other facility with appropriate resources  Description: INTERVENTIONS:  - Identify barriers to discharge w/patient and caregiver  - Arrange for needed discharge resources and transportation as appropriate  - Identify discharge learning needs (meds, wound care, etc.)  - Arrange for interpretive services to assist at discharge as needed  - Refer to Case Management Department for coordinating discharge planning if the patient needs post-hospital services based on physician/advanced practitioner order or complex needs related to functional status, cognitive ability, or social support system  Outcome: Progressing     Problem: Knowledge Deficit  Goal: Patient/family/caregiver demonstrates understanding of disease process, treatment plan, medications, and discharge instructions  Description: Complete learning assessment and assess knowledge base. Interventions:  - Provide teaching at level of understanding  - Provide teaching via preferred learning methods  Outcome: Progressing     Problem: MOBILITY - ADULT  Goal: Maintain or return to baseline ADL function  Description: INTERVENTIONS:  -  Assess patient's ability to carry out ADLs; assess patient's baseline for ADL function and identify physical deficits which impact ability to perform ADLs (bathing, care of mouth/teeth, toileting, grooming, dressing, etc.)  - Assess/evaluate cause of self-care deficits   - Assess range of motion  - Assess patient's mobility; develop plan if impaired  - Assess patient's need for assistive devices and provide as appropriate  - Encourage maximum independence but intervene and supervise when necessary  - Involve family in performance of ADLs  - Assess for home care needs following discharge   - Consider OT consult to assist with ADL evaluation and planning for discharge  - Provide patient education as appropriate  Outcome: Progressing  Goal: Maintains/Returns to pre admission functional level  Description: INTERVENTIONS:  - Perform BMAT or MOVE assessment daily.   - Set and communicate daily mobility goal to care team and patient/family/caregiver. - Collaborate with rehabilitation services on mobility goals if consulted  - Reposition patient every 2 hours.   - Record patient progress and toleration of activity level   Outcome: Progressing

## 2023-08-26 NOTE — NURSING NOTE
Resting/dozing in bed. 02 is maintained 2 liters n/c 02 sat 97% hr 72/min. resp easy and no distress. No changes to wounds  Call bell near and no distress. Shy Shannon Gabbi Mirtha

## 2023-08-27 LAB
ANION GAP SERPL CALCULATED.3IONS-SCNC: 4 MMOL/L
BACTERIA BLD CULT: NORMAL
BACTERIA BLD CULT: NORMAL
BUN SERPL-MCNC: 27 MG/DL (ref 5–25)
CALCIUM SERPL-MCNC: 8.4 MG/DL (ref 8.4–10.2)
CHLORIDE SERPL-SCNC: 98 MMOL/L (ref 96–108)
CO2 SERPL-SCNC: 35 MMOL/L (ref 21–32)
CREAT SERPL-MCNC: 1.23 MG/DL (ref 0.6–1.3)
ERYTHROCYTE [DISTWIDTH] IN BLOOD BY AUTOMATED COUNT: 17.5 % (ref 11.6–15.1)
GFR SERPL CREATININE-BSD FRML MDRD: 43 ML/MIN/1.73SQ M
GLUCOSE SERPL-MCNC: 106 MG/DL (ref 65–140)
HCT VFR BLD AUTO: 33.5 % (ref 34.8–46.1)
HGB BLD-MCNC: 9.7 G/DL (ref 11.5–15.4)
INR PPP: 2.48 (ref 0.84–1.19)
MCH RBC QN AUTO: 24.7 PG (ref 26.8–34.3)
MCHC RBC AUTO-ENTMCNC: 29 G/DL (ref 31.4–37.4)
MCV RBC AUTO: 86 FL (ref 82–98)
PLATELET # BLD AUTO: 498 THOUSANDS/UL (ref 149–390)
PMV BLD AUTO: 8 FL (ref 8.9–12.7)
POTASSIUM SERPL-SCNC: 3.7 MMOL/L (ref 3.5–5.3)
PROTHROMBIN TIME: 26.8 SECONDS (ref 11.6–14.5)
RBC # BLD AUTO: 3.92 MILLION/UL (ref 3.81–5.12)
SODIUM SERPL-SCNC: 137 MMOL/L (ref 135–147)
WBC # BLD AUTO: 8.61 THOUSAND/UL (ref 4.31–10.16)

## 2023-08-27 PROCEDURE — 80048 BASIC METABOLIC PNL TOTAL CA: CPT | Performed by: NURSE PRACTITIONER

## 2023-08-27 PROCEDURE — 99232 SBSQ HOSP IP/OBS MODERATE 35: CPT | Performed by: PHYSICIAN ASSISTANT

## 2023-08-27 PROCEDURE — 85610 PROTHROMBIN TIME: CPT

## 2023-08-27 PROCEDURE — 85027 COMPLETE CBC AUTOMATED: CPT | Performed by: NURSE PRACTITIONER

## 2023-08-27 RX ORDER — DOCUSATE SODIUM 100 MG/1
100 CAPSULE, LIQUID FILLED ORAL DAILY
Status: DISCONTINUED | OUTPATIENT
Start: 2023-08-27 | End: 2023-08-28 | Stop reason: HOSPADM

## 2023-08-27 RX ADMIN — LEVOTHYROXINE SODIUM 125 MCG: 25 TABLET ORAL at 06:34

## 2023-08-27 RX ADMIN — LINEZOLID 300 MG: 600 TABLET, FILM COATED ORAL at 10:22

## 2023-08-27 RX ADMIN — GABAPENTIN 100 MG: 100 CAPSULE ORAL at 17:25

## 2023-08-27 RX ADMIN — GABAPENTIN 100 MG: 100 CAPSULE ORAL at 10:22

## 2023-08-27 RX ADMIN — DOCUSATE SODIUM 100 MG: 100 CAPSULE, LIQUID FILLED ORAL at 14:49

## 2023-08-27 RX ADMIN — MIDODRINE HYDROCHLORIDE 2.5 MG: 5 TABLET ORAL at 06:34

## 2023-08-27 RX ADMIN — MIDODRINE HYDROCHLORIDE 2.5 MG: 5 TABLET ORAL at 12:18

## 2023-08-27 RX ADMIN — ALLOPURINOL 100 MG: 100 TABLET ORAL at 10:22

## 2023-08-27 RX ADMIN — MIDODRINE HYDROCHLORIDE 2.5 MG: 5 TABLET ORAL at 17:25

## 2023-08-27 RX ADMIN — VANCOMYCIN HYDROCHLORIDE 125 MG: 125 CAPSULE ORAL at 20:14

## 2023-08-27 RX ADMIN — BACITRACIN 1 LARGE APPLICATION: 500 OINTMENT TOPICAL at 10:22

## 2023-08-27 RX ADMIN — WARFARIN SODIUM 2 MG: 2 TABLET ORAL at 17:25

## 2023-08-27 RX ADMIN — DULOXETINE HYDROCHLORIDE 20 MG: 20 CAPSULE, DELAYED RELEASE ORAL at 10:22

## 2023-08-27 RX ADMIN — FUROSEMIDE 40 MG: 40 TABLET ORAL at 10:22

## 2023-08-27 RX ADMIN — LINEZOLID 300 MG: 600 TABLET, FILM COATED ORAL at 20:15

## 2023-08-27 RX ADMIN — BACITRACIN 1 LARGE APPLICATION: 500 OINTMENT TOPICAL at 23:00

## 2023-08-27 RX ADMIN — VANCOMYCIN HYDROCHLORIDE 125 MG: 125 CAPSULE ORAL at 10:22

## 2023-08-27 RX ADMIN — GABAPENTIN 100 MG: 100 CAPSULE ORAL at 20:14

## 2023-08-27 NOTE — PROGRESS NOTES
[address] [facility]  Progress Note  Name: Whitney Rangel  MRN: [id number]  Unit/Bed#: -78 I Date of Admission: 8/21/2023   Date of Service: 8/27/2023 I Hospital Day: 6    Assessment/Plan   Chronic atrial fibrillation [facility])  Assessment & Plan  · Continue Coumadin for anticoagulation  · Rate is currently controlled  · INR therapeutic    Lab Results   Component Value Date    INR 2.48 (H) 08/27/2023    INR 2.16 (H) 08/26/2023         History of Clostridioides difficile infection  Assessment & Plan  Reports 3 yellow/loose stools prior to coming to ED - check C. Diff  · Patient given clindamycin in ED - Will order prophylaxis vanco  · No longer experiencing diarrhea, requested a stool softener 8/27    Ambulatory dysfunction  Assessment & Plan  · Patient receives home therapy services through [facility]    Chronic diastolic congestive heart failure [facility])  Assessment & Plan  Wt Readings from Last 3 Encounters:   08/24/23 (!) 200 kg (439 lb 15.9 oz)   08/10/23 (!) 200 kg (441 lb 9.3 oz)   06/12/23 (!) 199 kg (438 lb 7.9 oz)     · Continue Lasix with low dose midodrine to allow adequate blood pressure for IV diuresis. · Now transitioned to oral diuretic regimen and stable  · Monitor fluid status  · I&Os    Class 3 severe obesity due to excess calories with serious comorbidity and body mass index (BMI) greater than or equal to 70 in adult [facility])  Assessment & Plan  BMI 77  · Healthy diet lifestyle modification recommended  · Consider referral to bariatric services on discharge if patient would like    * Panniculitis  Assessment & Plan  Patient with recurrent abdominal/right truncal infection. Was recently hospitalized August 5 - August 10 for rash with shingles. She was discharged home on Valtrex, linezolid, oral vancomycin prophylaxis.  Patient has high risk of frequent reinfections due to obesity  · History of MRSA  · apprec ID - > transitioned to linezolid last dose being given 8/27  · Wound care consult             VTE Pharmacologic Prophylaxis: VTE Score: 9 High Risk (Score >/= 5) - Pharmacological DVT Prophylaxis Ordered: warfarin (Coumadin). Sequential Compression Devices Ordered. Patient Centered Rounds: I performed bedside rounds with nursing staff today. Discussions with Specialists or Other Care Team Provider: none    Education and Discussions with Family / Patient: Patient declined call to . Total Time Spent on Date of Encounter in care of patient: 45 minutes This time was spent on one or more of the following: performing physical exam; counseling and coordination of care; obtaining or reviewing history; documenting in the medical record; reviewing/ordering tests, medications or procedures; communicating with other healthcare professionals and discussing with patient's family/caregivers. Current Length of Stay: 6 day(s)  Current Patient Status: Inpatient   Certification Statement: The patient will continue to require additional inpatient hospital stay due to iv abx  Discharge Plan: Anticipate discharge tomorrow to home with home services. Code Status: Level 1 - Full Code    Subjective:   Patient reports some drainage still coming from her wound sites but otherwise feels well. Has been having bowel movements but feels she is not completely evacuating    Objective:     Vitals:   Temp (24hrs), Av.6 °F (36.4 °C), Min:97.3 °F (36.3 °C), Max:98 °F (36.7 °C)    Temp:  [97.3 °F (36.3 °C)-98 °F (36.7 °C)] 97.3 °F (36.3 °C)  HR:  [63-74] 63  Resp:  [16-19] 16  BP: (105-111)/(50-59) 110/50  SpO2:  [95 %-98 %] 98 %  Body mass index is 77.96 kg/m². Input and Output Summary (last 24 hours): Intake/Output Summary (Last 24 hours) at 2023 1300  Last data filed at 2023 0900  Gross per 24 hour   Intake 120 ml   Output 1350 ml   Net -1230 ml       Physical Exam:   Physical Exam  Vitals and nursing note reviewed. Constitutional:       Appearance: She is obese.  She is ill-appearing. Cardiovascular:      Rate and Rhythm: Normal rate. Rhythm irregular. Pulses: Normal pulses. Heart sounds: Normal heart sounds. Pulmonary:      Effort: Pulmonary effort is normal.      Breath sounds: Normal breath sounds. Abdominal:      General: Bowel sounds are normal.   Musculoskeletal:      Right lower leg: No edema. Left lower leg: No edema. Skin:     General: Skin is warm. Neurological:      General: No focal deficit present. Mental Status: She is alert. Mental status is at baseline. Psychiatric:         Mood and Affect: Mood normal.         Behavior: Behavior normal.          Additional Data:     Labs:  Results from last 7 days   Lab Units 08/27/23 0521 08/26/23 0524 08/25/23  0454   WBC Thousand/uL 8.61   < > 9.40   HEMOGLOBIN g/dL 9.7*   < > 9.5*   HEMATOCRIT % 33.5*   < > 33.4*   PLATELETS Thousands/uL 498*   < > 452*   NEUTROS PCT %  --   --  80*   LYMPHS PCT %  --   --  10*   MONOS PCT %  --   --  6   EOS PCT %  --   --  3    < > = values in this interval not displayed. Results from last 7 days   Lab Units 08/27/23 0521 08/26/23 0524 08/25/23  0454   SODIUM mmol/L 137   < > 135   POTASSIUM mmol/L 3.7   < > 4.7   CHLORIDE mmol/L 98   < > 98   CO2 mmol/L 35*   < > 32   BUN mg/dL 27*   < > 25   CREATININE mg/dL 1.23   < > 1.32*   ANION GAP mmol/L 4   < > 5   CALCIUM mg/dL 8.4   < > 8.2*   ALBUMIN g/dL  --   --  2.4*   TOTAL BILIRUBIN mg/dL  --   --  0.28   ALK PHOS U/L  --   --  90   ALT U/L  --   --  10   AST U/L  --   --  28   GLUCOSE RANDOM mg/dL 106   < > 102    < > = values in this interval not displayed.      Results from last 7 days   Lab Units 08/27/23 0521   INR  2.48*             Results from last 7 days   Lab Units 08/23/23  0520 08/22/23  0525 08/21/23  1546   LACTIC ACID mmol/L  --   --  1.2   PROCALCITONIN ng/ml 0.14 0.20 0.14       Lines/Drains:  Invasive Devices     Peripheral Intravenous Line  Duration           Peripheral IV 08/25/23 Distal;Dorsal (posterior); Left Forearm 2 days          Drain  Duration           External Urinary Catheter 3 days                      Imaging: No pertinent imaging reviewed. Recent Cultures (last 7 days):   Results from last 7 days   Lab Units 08/22/23  1840 08/21/23  1546   BLOOD CULTURE   --  No Growth After 5 Days. No Growth After 5 Days. GRAM STAIN RESULT  No Polys or Bacteria seen  --    WOUND CULTURE  1+ Growth of Pseudomonas aeruginosa*  3+ Growth of  --        Last 24 Hours Medication List:   Current Facility-Administered Medications   Medication Dose Route Frequency Provider Last Rate   • acetaminophen  650 mg Oral Q4H PRN Saúl Castellon PA-C     • allopurinol  100 mg Oral Daily Saúl Castellon PA-C     • bacitracin  1 large application Topical BID Ambrocio Swann PA-C     • docusate sodium  100 mg Oral Daily Sherlyn Perdomo PA-C     • DULoxetine  20 mg Oral Daily Saúl Castellon PA-C     • furosemide  40 mg Oral Daily ORACIO Sanchez     • gabapentin  100 mg Oral TID Saúl Castellon PA-C     • levothyroxine  125 mcg Oral Early Morning Saúl Castellon PA-C     • linezolid  300 mg Oral Q12H [address] Arpan Banegas MD     • midodrine  2.5 mg Oral TID AC Karla Mcduffie PA-C     • ondansetron  4 mg Intravenous Q6H PRN Saúl Castellon PA-C     • triamcinolone   Topical BID Ambrocio Swann PA-C     • vancomycin oral (capsules or solution)  125 mg Oral Q12H Hayden Zelaya MD     • warfarin  2 mg Oral Once per day on Sun Mon Wed Fri Sat Saúl Castellon PA-C     • warfarin  4 mg Oral Once per day on Tue Thu Saúl Castellon PA-C          Today, Patient Was Seen By: Sherlyn Perdomo PA-C    **Please Note: This note may have been constructed using a voice recognition system. **

## 2023-08-27 NOTE — ASSESSMENT & PLAN NOTE
· Continue Coumadin for anticoagulation  · Rate is currently controlled  · INR therapeutic    Lab Results   Component Value Date    INR 2.48 (H) 08/27/2023    INR 2.16 (H) 08/26/2023

## 2023-08-27 NOTE — NURSING NOTE
AWAKE AND PLEASANT. R/A STATUS . 02 SAT 75/MIN. ALL WOUND DRESSINGS MAINTAINED AS ORDERED. PT REFUSES TURNING AND REPO Q 2 HRS.

## 2023-08-27 NOTE — ASSESSMENT & PLAN NOTE
Patient with recurrent abdominal/right truncal infection. Was recently hospitalized August 5 - August 10 for rash with shingles. She was discharged home on Valtrex, linezolid, oral vancomycin prophylaxis.  Patient has high risk of frequent reinfections due to obesity  · History of MRSA  · apprec ID - > transitioned to linezolid last dose being given 8/27  · Wound care consult

## 2023-08-27 NOTE — ASSESSMENT & PLAN NOTE
Wt Readings from Last 3 Encounters:   08/24/23 (!) 200 kg (439 lb 15.9 oz)   08/10/23 (!) 200 kg (441 lb 9.3 oz)   06/12/23 (!) 199 kg (438 lb 7.9 oz)     · Continue Lasix with low dose midodrine to allow adequate blood pressure for IV diuresis.   · Now transitioned to oral diuretic regimen and stable  · Monitor fluid status  · I&Os

## 2023-08-27 NOTE — PLAN OF CARE
Problem: Prexisting or High Potential for Compromised Skin Integrity  Goal: Skin integrity is maintained or improved  Description: INTERVENTIONS:  - Identify patients at risk for skin breakdown  - Assess and monitor skin integrity  - Assess and monitor nutrition and hydration status  - Monitor labs   - Assess for incontinence   - Turn and reposition patient  - Assist with mobility/ambulation  - Relieve pressure over bony prominences  - Avoid friction and shearing  - Provide appropriate hygiene as needed including keeping skin clean and dry  - Evaluate need for skin moisturizer/barrier cream  - Collaborate with interdisciplinary team   - Patient/family teaching  - Consider wound care consult   Outcome: Progressing     Problem: Nutrition/Hydration-ADULT  Goal: Nutrient/Hydration intake appropriate for improving, restoring or maintaining nutritional needs  Description: Monitor and assess patient's nutrition/hydration status for malnutrition. Collaborate with interdisciplinary team and initiate plan and interventions as ordered. Monitor patient's weight and dietary intake as ordered or per policy. Utilize nutrition screening tool and intervene as necessary. Determine patient's food preferences and provide high-protein, high-caloric foods as appropriate.      INTERVENTIONS:  - Monitor oral intake, urinary output, labs, and treatment plans  - Assess nutrition and hydration status and recommend course of action  - Evaluate amount of meals eaten  - Assist patient with eating if necessary   - Allow adequate time for meals  - Recommend/ encourage appropriate diets, oral nutritional supplements, and vitamin/mineral supplements  - Order, calculate, and assess calorie counts as needed  - Recommend, monitor, and adjust tube feedings and TPN/PPN based on assessed needs  - Assess need for intravenous fluids  - Provide specific nutrition/hydration education as appropriate  - Include patient/family/caregiver in decisions related to nutrition  Outcome: Progressing     Problem: RESPIRATORY - ADULT  Goal: Achieves optimal ventilation and oxygenation  Description: INTERVENTIONS:  - Assess for changes in respiratory status  - Assess for changes in mentation and behavior  - Position to facilitate oxygenation and minimize respiratory effort  - Oxygen administered by appropriate delivery if ordered  - Initiate smoking cessation education as indicated  - Encourage broncho-pulmonary hygiene including cough, deep breathe, Incentive Spirometry  - Assess the need for suctioning and aspirate as needed  - Assess and instruct to report SOB or any respiratory difficulty  - Respiratory Therapy support as indicated  Outcome: Progressing     Problem: GENITOURINARY - ADULT  Goal: Maintains or returns to baseline urinary function  Description: INTERVENTIONS:  - Assess urinary function  - Encourage oral fluids to ensure adequate hydration if ordered  - Administer IV fluids as ordered to ensure adequate hydration  - Administer ordered medications as needed  - Offer frequent toileting  - Follow urinary retention protocol if ordered  Outcome: Progressing  Goal: Absence of urinary retention  Description: INTERVENTIONS:  - Assess patient’s ability to void and empty bladder  - Monitor I/O  - Bladder scan as needed  - Discuss with physician/AP medications to alleviate retention as needed  - Discuss catheterization for long term situations as appropriate  Outcome: Progressing     Problem: MUSCULOSKELETAL - ADULT  Goal: Maintain or return mobility to safest level of function  Description: INTERVENTIONS:  - Assess patient's ability to carry out ADLs; assess patient's baseline for ADL function and identify physical deficits which impact ability to perform ADLs (bathing, care of mouth/teeth, toileting, grooming, dressing, etc.)  - Assess/evaluate cause of self-care deficits   - Assess range of motion  - Assess patient's mobility  - Assess patient's need for assistive devices and provide as appropriate  - Encourage maximum independence but intervene and supervise when necessary  - Involve family in performance of ADLs  - Assess for home care needs following discharge   - Consider OT consult to assist with ADL evaluation and planning for discharge  - Provide patient education as appropriate  Outcome: Progressing     Problem: PAIN - ADULT  Goal: Verbalizes/displays adequate comfort level or baseline comfort level  Description: Interventions:  - Encourage patient to monitor pain and request assistance  - Assess pain using appropriate pain scale  - Administer analgesics based on type and severity of pain and evaluate response  - Implement non-pharmacological measures as appropriate and evaluate response  - Consider cultural and social influences on pain and pain management  - Notify physician/advanced practitioner if interventions unsuccessful or patient reports new pain  Outcome: Progressing     Problem: INFECTION - ADULT  Goal: Absence or prevention of progression during hospitalization  Description: INTERVENTIONS:  - Assess and monitor for signs and symptoms of infection  - Monitor lab/diagnostic results  - Monitor all insertion sites, i.e. indwelling lines, tubes, and drains  - Monitor endotracheal if appropriate and nasal secretions for changes in amount and color  - [place] appropriate cooling/warming therapies per order  - Administer medications as ordered  - Instruct and encourage patient and family to use good hand hygiene technique  - Identify and instruct in appropriate isolation precautions for identified infection/condition  Outcome: Progressing  Goal: Absence of fever/infection during neutropenic period  Description: INTERVENTIONS:  - Monitor WBC    Outcome: Progressing     Problem: SAFETY ADULT  Goal: Patient will remain free of falls  Description: INTERVENTIONS:  - Educate patient/family on patient safety including physical limitations  - Instruct patient to call for assistance with activity   - Consult OT/PT to assist with strengthening/mobility   - Keep Call bell within reach  - Keep bed low and locked with side rails adjusted as appropriate  - Keep care items and personal belongings within reach  - Initiate and maintain comfort rounds  - Make Fall Risk Sign visible to staff  - Offer Toileting every 2 Hours, in advance of need  - Initiate/Maintain bed/chair alarm  - Apply yellow socks and bracelet for high fall risk patients  - Consider moving patient to room near nurses station  Outcome: Progressing  Goal: Maintain or return to baseline ADL function  Description: INTERVENTIONS:  -  Assess patient's ability to carry out ADLs; assess patient's baseline for ADL function and identify physical deficits which impact ability to perform ADLs (bathing, care of mouth/teeth, toileting, grooming, dressing, etc.)  - Assess/evaluate cause of self-care deficits   - Assess range of motion  - Assess patient's mobility; develop plan if impaired  - Assess patient's need for assistive devices and provide as appropriate  - Encourage maximum independence but intervene and supervise when necessary  - Involve family in performance of ADLs  - Assess for home care needs following discharge   - Consider OT consult to assist with ADL evaluation and planning for discharge  - Provide patient education as appropriate  Outcome: Progressing  Goal: Maintains/Returns to pre admission functional level  Description: INTERVENTIONS:  - Perform BMAT or MOVE assessment daily.   - Set and communicate daily mobility goal to care team and patient/family/caregiver. - Collaborate with rehabilitation services on mobility goals if consulted  - Reposition patient every 2 hours.   - Record patient progress and toleration of activity level   Outcome: Progressing     Problem: DISCHARGE PLANNING  Goal: Discharge to home or other facility with appropriate resources  Description: INTERVENTIONS:  - Identify barriers to discharge w/patient and caregiver  - Arrange for needed discharge resources and transportation as appropriate  - Identify discharge learning needs (meds, wound care, etc.)  - Arrange for interpretive services to assist at discharge as needed  - Refer to Case Management Department for coordinating discharge planning if the patient needs post-hospital services based on physician/advanced practitioner order or complex needs related to functional status, cognitive ability, or social support system  Outcome: Progressing     Problem: Knowledge Deficit  Goal: Patient/family/caregiver demonstrates understanding of disease process, treatment plan, medications, and discharge instructions  Description: Complete learning assessment and assess knowledge base.   Interventions:  - Provide teaching at level of understanding  - Provide teaching via preferred learning methods  Outcome: Progressing     Problem: MOBILITY - ADULT  Goal: Maintain or return to baseline ADL function  Description: INTERVENTIONS:  -  Assess patient's ability to carry out ADLs; assess patient's baseline for ADL function and identify physical deficits which impact ability to perform ADLs (bathing, care of mouth/teeth, toileting, grooming, dressing, etc.)  - Assess/evaluate cause of self-care deficits   - Assess range of motion  - Assess patient's mobility; develop plan if impaired  - Assess patient's need for assistive devices and provide as appropriate  - Encourage maximum independence but intervene and supervise when necessary  - Involve family in performance of ADLs  - Assess for home care needs following discharge   - Consider OT consult to assist with ADL evaluation and planning for discharge  - Provide patient education as appropriate  Outcome: Progressing  Goal: Maintains/Returns to pre admission functional level  Description: INTERVENTIONS:  - Perform BMAT or MOVE assessment daily.   - Set and communicate daily mobility goal to care team and patient/family/caregiver. - Collaborate with rehabilitation services on mobility goals if consulted  - Reposition patient every 2 hours.   - Record patient progress and toleration of activity level   Outcome: Progressing

## 2023-08-27 NOTE — ASSESSMENT & PLAN NOTE
BMI 77  · Healthy diet lifestyle modification recommended  · Consider referral to bariatric services on discharge if patient would like

## 2023-08-27 NOTE — ASSESSMENT & PLAN NOTE
Reports 3 yellow/loose stools prior to coming to ED - check C.  Diff  · Patient given clindamycin in ED - Will order prophylaxis vanco  · No longer experiencing diarrhea, requested a stool softener 8/27

## 2023-08-28 VITALS
SYSTOLIC BLOOD PRESSURE: 105 MMHG | TEMPERATURE: 97.1 F | HEART RATE: 64 BPM | WEIGHT: 293 LBS | HEIGHT: 63 IN | BODY MASS INDEX: 51.91 KG/M2 | RESPIRATION RATE: 16 BRPM | DIASTOLIC BLOOD PRESSURE: 62 MMHG | OXYGEN SATURATION: 99 %

## 2023-08-28 LAB
INR PPP: 2.54 (ref 0.84–1.19)
PROTHROMBIN TIME: 27.2 SECONDS (ref 11.6–14.5)

## 2023-08-28 PROCEDURE — 85610 PROTHROMBIN TIME: CPT

## 2023-08-28 PROCEDURE — 99239 HOSP IP/OBS DSCHRG MGMT >30: CPT | Performed by: NURSE PRACTITIONER

## 2023-08-28 PROCEDURE — 99232 SBSQ HOSP IP/OBS MODERATE 35: CPT | Performed by: INTERNAL MEDICINE

## 2023-08-28 RX ORDER — WARFARIN SODIUM 2 MG/1
TABLET ORAL
Qty: 20 TABLET | Refills: 0 | Status: ON HOLD | OUTPATIENT
Start: 2023-08-29

## 2023-08-28 RX ORDER — WARFARIN SODIUM 2 MG/1
TABLET ORAL
Qty: 8 TABLET | Refills: 0 | Status: SHIPPED | OUTPATIENT
Start: 2023-08-29 | End: 2023-08-28 | Stop reason: SDUPTHER

## 2023-08-28 RX ORDER — WARFARIN SODIUM 2 MG/1
TABLET ORAL
Qty: 20 TABLET | Refills: 0 | Status: ON HOLD | OUTPATIENT
Start: 2023-08-28

## 2023-08-28 RX ORDER — VANCOMYCIN HYDROCHLORIDE 125 MG/1
125 CAPSULE ORAL EVERY 12 HOURS SCHEDULED
Qty: 5 CAPSULE | Refills: 0 | Status: SHIPPED | OUTPATIENT
Start: 2023-08-28 | End: 2023-08-31

## 2023-08-28 RX ORDER — MIDODRINE HYDROCHLORIDE 2.5 MG/1
2.5 TABLET ORAL
Qty: 90 TABLET | Refills: 0 | Status: ON HOLD | OUTPATIENT
Start: 2023-08-28 | End: 2023-09-27

## 2023-08-28 RX ORDER — DOCUSATE SODIUM 100 MG/1
100 CAPSULE, LIQUID FILLED ORAL DAILY
Qty: 30 CAPSULE | Refills: 0 | Status: ON HOLD | OUTPATIENT
Start: 2023-08-29

## 2023-08-28 RX ADMIN — MIDODRINE HYDROCHLORIDE 2.5 MG: 5 TABLET ORAL at 12:18

## 2023-08-28 RX ADMIN — GABAPENTIN 100 MG: 100 CAPSULE ORAL at 10:08

## 2023-08-28 RX ADMIN — DOCUSATE SODIUM 100 MG: 100 CAPSULE, LIQUID FILLED ORAL at 10:08

## 2023-08-28 RX ADMIN — MIDODRINE HYDROCHLORIDE 2.5 MG: 5 TABLET ORAL at 06:05

## 2023-08-28 RX ADMIN — DULOXETINE HYDROCHLORIDE 20 MG: 20 CAPSULE, DELAYED RELEASE ORAL at 10:08

## 2023-08-28 RX ADMIN — LEVOTHYROXINE SODIUM 125 MCG: 25 TABLET ORAL at 06:05

## 2023-08-28 RX ADMIN — BACITRACIN 1 LARGE APPLICATION: 500 OINTMENT TOPICAL at 12:22

## 2023-08-28 RX ADMIN — VANCOMYCIN HYDROCHLORIDE 125 MG: 125 CAPSULE ORAL at 10:08

## 2023-08-28 RX ADMIN — ALLOPURINOL 100 MG: 100 TABLET ORAL at 10:08

## 2023-08-28 NOTE — PLAN OF CARE
Problem: Prexisting or High Potential for Compromised Skin Integrity  Goal: Skin integrity is maintained or improved  Description: INTERVENTIONS:  - Identify patients at risk for skin breakdown  - Assess and monitor skin integrity  - Assess and monitor nutrition and hydration status  - Monitor labs   - Assess for incontinence   - Turn and reposition patient  - Assist with mobility/ambulation  - Relieve pressure over bony prominences  - Avoid friction and shearing  - Provide appropriate hygiene as needed including keeping skin clean and dry  - Evaluate need for skin moisturizer/barrier cream  - Collaborate with interdisciplinary team   - Patient/family teaching  - Consider wound care consult   Outcome: Progressing     Problem: Nutrition/Hydration-ADULT  Goal: Nutrient/Hydration intake appropriate for improving, restoring or maintaining nutritional needs  Description: Monitor and assess patient's nutrition/hydration status for malnutrition. Collaborate with interdisciplinary team and initiate plan and interventions as ordered. Monitor patient's weight and dietary intake as ordered or per policy. Utilize nutrition screening tool and intervene as necessary. Determine patient's food preferences and provide high-protein, high-caloric foods as appropriate.      INTERVENTIONS:  - Monitor oral intake, urinary output, labs, and treatment plans  - Assess nutrition and hydration status and recommend course of action  - Evaluate amount of meals eaten  - Assist patient with eating if necessary   - Allow adequate time for meals  - Recommend/ encourage appropriate diets, oral nutritional supplements, and vitamin/mineral supplements  - Order, calculate, and assess calorie counts as needed  - Recommend, monitor, and adjust tube feedings and TPN/PPN based on assessed needs  - Assess need for intravenous fluids  - Provide specific nutrition/hydration education as appropriate  - Include patient/family/caregiver in decisions related to nutrition  Outcome: Progressing     Problem: RESPIRATORY - ADULT  Goal: Achieves optimal ventilation and oxygenation  Description: INTERVENTIONS:  - Assess for changes in respiratory status  - Assess for changes in mentation and behavior  - Position to facilitate oxygenation and minimize respiratory effort  - Oxygen administered by appropriate delivery if ordered  - Initiate smoking cessation education as indicated  - Encourage broncho-pulmonary hygiene including cough, deep breathe, Incentive Spirometry  - Assess the need for suctioning and aspirate as needed  - Assess and instruct to report SOB or any respiratory difficulty  - Respiratory Therapy support as indicated  Outcome: Progressing     Problem: GENITOURINARY - ADULT  Goal: Maintains or returns to baseline urinary function  Description: INTERVENTIONS:  - Assess urinary function  - Encourage oral fluids to ensure adequate hydration if ordered  - Administer IV fluids as ordered to ensure adequate hydration  - Administer ordered medications as needed  - Offer frequent toileting  - Follow urinary retention protocol if ordered  Outcome: Progressing  Goal: Absence of urinary retention  Description: INTERVENTIONS:  - Assess patient’s ability to void and empty bladder  - Monitor I/O  - Bladder scan as needed  - Discuss with physician/AP medications to alleviate retention as needed  - Discuss catheterization for long term situations as appropriate  Outcome: Progressing    Problem: MUSCULOSKELETAL - ADULT  Goal: Maintain or return mobility to safest level of function  Description: INTERVENTIONS:  - Assess patient's ability to carry out ADLs; assess patient's baseline for ADL function and identify physical deficits which impact ability to perform ADLs (bathing, care of mouth/teeth, toileting, grooming, dressing, etc.)  - Assess/evaluate cause of self-care deficits   - Assess range of motion  - Assess patient's mobility  - Assess patient's need for assistive devices and provide as appropriate  - Encourage maximum independence but intervene and supervise when necessary  - Involve family in performance of ADLs  - Assess for home care needs following discharge   - Consider OT consult to assist with ADL evaluation and planning for discharge  - Provide patient education as appropriate  Outcome: Progressing     Problem: PAIN - ADULT  Goal: Verbalizes/displays adequate comfort level or baseline comfort level  Description: Interventions:  - Encourage patient to monitor pain and request assistance  - Assess pain using appropriate pain scale  - Administer analgesics based on type and severity of pain and evaluate response  - Implement non-pharmacological measures as appropriate and evaluate response  - Consider cultural and social influences on pain and pain management  - Notify physician/advanced practitioner if interventions unsuccessful or patient reports new pain  Outcome: Progressing     Problem: INFECTION - ADULT  Goal: Absence or prevention of progression during hospitalization  Description: INTERVENTIONS:  - Assess and monitor for signs and symptoms of infection  - Monitor lab/diagnostic results  - Monitor all insertion sites, i.e. indwelling lines, tubes, and drains  - Monitor endotracheal if appropriate and nasal secretions for changes in amount and color  - [place] appropriate cooling/warming therapies per order  - Administer medications as ordered  - Instruct and encourage patient and family to use good hand hygiene technique  - Identify and instruct in appropriate isolation precautions for identified infection/condition  Outcome: Progressing  Goal: Absence of fever/infection during neutropenic period  Description: INTERVENTIONS:  - Monitor WBC    Outcome: Progressing     Problem: SAFETY ADULT  Goal: Patient will remain free of falls  Description: INTERVENTIONS:  - Educate patient/family on patient safety including physical limitations  - Instruct patient to call for assistance with activity   - Consult OT/PT to assist with strengthening/mobility   - Keep Call bell within reach  - Keep bed low and locked with side rails adjusted as appropriate  - Keep care items and personal belongings within reach  - Initiate and maintain comfort rounds  - Make Fall Risk Sign visible to staff  - Offer Toileting every 2 Hours, in advance of need  - Initiate/Maintain bed/chair alarm  - Apply yellow socks and bracelet for high fall risk patients  - Consider moving patient to room near nurses station  Outcome: Progressing  Goal: Maintain or return to baseline ADL function  Description: INTERVENTIONS:  -  Assess patient's ability to carry out ADLs; assess patient's baseline for ADL function and identify physical deficits which impact ability to perform ADLs (bathing, care of mouth/teeth, toileting, grooming, dressing, etc.)  - Assess/evaluate cause of self-care deficits   - Assess range of motion  - Assess patient's mobility; develop plan if impaired  - Assess patient's need for assistive devices and provide as appropriate  - Encourage maximum independence but intervene and supervise when necessary  - Involve family in performance of ADLs  - Assess for home care needs following discharge   - Consider OT consult to assist with ADL evaluation and planning for discharge  - Provide patient education as appropriate  Outcome: Progressing  Goal: Maintains/Returns to pre admission functional level  Description: INTERVENTIONS:  - Perform BMAT or MOVE assessment daily.   - Set and communicate daily mobility goal to care team and patient/family/caregiver. - Collaborate with rehabilitation services on mobility goals if consulted  - Reposition patient every 2 hours.   - Record patient progress and toleration of activity level   Outcome: Progressing     Problem: DISCHARGE PLANNING  Goal: Discharge to home or other facility with appropriate resources  Description: INTERVENTIONS:  - Identify barriers to discharge w/patient and caregiver  - Arrange for needed discharge resources and transportation as appropriate  - Identify discharge learning needs (meds, wound care, etc.)  - Arrange for interpretive services to assist at discharge as needed  - Refer to Case Management Department for coordinating discharge planning if the patient needs post-hospital services based on physician/advanced practitioner order or complex needs related to functional status, cognitive ability, or social support system  Outcome: Progressing     Problem: Knowledge Deficit  Goal: Patient/family/caregiver demonstrates understanding of disease process, treatment plan, medications, and discharge instructions  Description: Complete learning assessment and assess knowledge base. Interventions:  - Provide teaching at level of understanding  - Provide teaching via preferred learning methods  Outcome: Progressing     Problem: MOBILITY - ADULT  Goal: Maintain or return to baseline ADL function  Description: INTERVENTIONS:  -  Assess patient's ability to carry out ADLs; assess patient's baseline for ADL function and identify physical deficits which impact ability to perform ADLs (bathing, care of mouth/teeth, toileting, grooming, dressing, etc.)  - Assess/evaluate cause of self-care deficits   - Assess range of motion  - Assess patient's mobility; develop plan if impaired  - Assess patient's need for assistive devices and provide as appropriate  - Encourage maximum independence but intervene and supervise when necessary  - Involve family in performance of ADLs  - Assess for home care needs following discharge   - Consider OT consult to assist with ADL evaluation and planning for discharge  - Provide patient education as appropriate  Outcome: Progressing  Goal: Maintains/Returns to pre admission functional level  Description: INTERVENTIONS:  - Perform BMAT or MOVE assessment daily.   - Set and communicate daily mobility goal to care team and patient/family/caregiver.    - Collaborate with rehabilitation services on mobility goals if consulted  - Reposition patient every 2 hours.   - Record patient progress and toleration of activity level   Outcome: Progressing

## 2023-08-28 NOTE — ASSESSMENT & PLAN NOTE
· Reported three yellow/loose stools prior to coming to ED C Diff ordered, then no bowel movement since  · Patient given clindamycin in ED.  Continue prophylaxis with oral vancomycin  · No longer experiencing diarrhea, requested a stool softener 8/27

## 2023-08-28 NOTE — ASSESSMENT & PLAN NOTE
· Patient receives home therapy services through [facility] and will continue same on discharge

## 2023-08-28 NOTE — CASE MANAGEMENT
Case Management Discharge Planning Note    Patient name Rosalind Lopez  Location [address] [facility] [address] 312/-00 MRN [id number]  : 1950 Date 2023       Current Admission Date: 2023  Current Admission Diagnosis:Panniculitis   Patient Active Problem List    Diagnosis Date Noted   • Shingles 2023   • Cellulitis 2023   • Chronic atrial fibrillation ([address]) 2022   • History of Clostridioides difficile infection 2022   • Lymphedema of extremity 2021   • Other specified hypothyroidism 10/03/2020   • Mild episode of recurrent major depressive disorder ([address]) 10/03/2020   • Idiopathic chronic 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 10/03/2020   • Primary osteoarthritis involving multiple joints 10/03/2020   • Class 3 severe obesity due to excess calories with serious comorbidity and body mass index (BMI) greater than or equal to 70 in adult [facility]) 10/03/2020   • Chronic diastolic congestive heart failure ([address]) 10/03/2020   • Panniculitis 10/03/2020   • Gastroesophageal reflux disease without esophagitis 10/03/2020   • Hx MRSA infection 2020   • Impaired mobility 2019   • Osteoarthritis of glenohumeral joint 2019   • Left ovarian cyst 2017   • Depression with anxiety 07/15/2017   • Anemia 2016   • Ambulatory dysfunction 2016   • Adjustment disorder 2013   • Irritable bowel syndrome 2012   • Left atrial enlargement 2012   • Left ventricular hypertrophy 2012   • Carpal tunnel syndrome 2012   • Gout 2012   • Hyperlipidemia 2012   • Symptomatic menopausal or female climacteric states 2012      LOS (days): 7  Geometric Mean LOS (GMLOS) (days): 2.90  Days to GMLOS:-3.8     OBJECTIVE:  Risk of Unplanned Readmission Score: 25.35         Current admission status: Inpatient   Preferred Pharmacy:   [id number] [address]  Phone: [phone] Fax: [phone]    [address] [facility], [address] [place] [address]  Phone: [phone] Fax: [phone]    Primary Care Provider: Jackie Chilel MD    Primary Insurance: MEDICARE  Secondary Insurance: AARP    DISCHARGE DETAILS:    Discharge planning discussed with[de-identified] Patient  Freedom of Choice: Yes          Would you like to participate in our [address] service program?  : No - Declined    Treatment Team Recommendation: Home with [address] [facility]  Discharge Destination Plan[de-identified] Home with [address] [facility] [address] at Discharge : BLS Ambulance        Transported by (Company and Unit #): [employer]  ETA of Transport (Date): 08/28/23  ETA of Transport (Time): 1400              IMM Given (Date):: 08/28/23  IMM Given to[de-identified] Patient     Additional Comments: Pt stable for discharge today per Unique from Mindy Rogel. Pt will return home with her caregiver and [facility] to resume. Transport scheduled with [place] [facility] at 1400. Pt aware and reports her caregiver will be able to meet her at home when she arrives. Pt nurse Tanika aware. Medical necessity for transport compelted and placed on chart.

## 2023-08-28 NOTE — NURSING NOTE
Discharge instructions discussed with patient at bedside. Patient states understanding. Patient transported via [address] ambulance.

## 2023-08-28 NOTE — DISCHARGE SUMMARY
[address] [facility]  Discharge- Cyndi Rodrigues 1950, 68 y.o. female MRN: [id number]  Unit/Bed#: -01 Encounter: [id number]  Primary Care Provider: Mendel Pares, MD   Date and time admitted to hospital: 8/21/2023  1:29 PM    * Panniculitis  Assessment & Plan  · Patient with recurrent abdominal/right truncal infection. Recent hospitalization hospitalized8/5-8/10 2023 for rash with shingles. She was discharged home on Valtrex, linezolid, oral vancomycin prophylaxis. Has high risk of frequent reinfections due to obesity  · History of MRSA  · Appreciate ID recommendations. Transitioned to linezolid with last dose 8/27/2023  · Wound care recommendations appreciated    Chronic atrial fibrillation ([address])  Assessment & Plan  · Rate is currently controlled  · Continue Coumadin for anticoagulation  · INR is therapeutic    Lab Results   Component Value Date    INR 2.54 (H) 08/28/2023    INR 2.48 (H) 08/27/2023         History of Clostridioides difficile infection  Assessment & Plan  · Reported three yellow/loose stools prior to coming to ED C Diff ordered, then no bowel movement since  · Patient given clindamycin in ED.  Continue prophylaxis with oral vancomycin  · No longer experiencing diarrhea, requested a stool softener 8/27    Ambulatory dysfunction  Assessment & Plan  · Patient receives home therapy services through [facility] and will continue same on discharge    Chronic diastolic congestive heart failure (HCC)  Assessment & Plan  Wt Readings from Last 3 Encounters:   08/24/23 (!) 200 kg (439 lb 15.9 oz)   08/10/23 (!) 200 kg (441 lb 9.3 oz)   06/12/23 (!) 199 kg (438 lb 7.9 oz)     · Continue Lasix with low dose midodrine to allow adequate blood pressure for IV diuresis  · Now transitioned to oral diuretic regimen and stable      Class 3 severe obesity due to excess calories with serious comorbidity and body mass index (BMI) greater than or equal to 70 in [facility])  Assessment & Plan  · BMI 77  · Healthy diet and lifestyle modification recommended  · Consider referral to bariatric services    Medical Problems     Resolved Problems  Date Reviewed: 8/28/2023   None       Discharging Physician / Practitioner: ORACIO Whyte  PCP: Daphney Robbins MD  Admission Date:   Admission Orders (From admission, onward)     Ordered        08/21/23 [address]  Once                      Discharge Date: 08/28/23    Consultations During Hospital Stay:  · Infectious disease    Complications: None apparent    Reason for Admission: Cellulitis    Hospital Course:   Ino Pryor is a 68 y.o. female patient who originally presented to the hospital on 8/21/2023 due to worsening cellulitis. She was started on IV vancomycin. Infectious disease was consulted. She was transitioned to oral linezolid. She received IV diuresis and also midodrine to allow her blood pressure to support diuresis. She was placed on oral vancomycin prophylaxis for history of C. Difficile. The rest of her hospital course was unremarkable, and she was discharged home with home health care. Please see full patient chart for additional details    Condition at Discharge: stable    Discharge Day Visit / Exam:   Subjective: Patient seen and examined with no complaints offered. Ready to go home. Vitals: Blood Pressure: 105/62 (08/28/23 0725)  Pulse: 64 (08/28/23 0725)  Temperature: (!) 97.1 °F (36.2 °C) (08/28/23 0725)  Temp Source: Tympanic (08/21/23 1800)  Respirations: 16 (08/28/23 0725)  Height: 5' 2.99" (160 cm) (08/24/23 1722)  Weight - Scale: (!) 200 kg (439 lb 15.9 oz) (08/24/23 1722)  SpO2: 99 % (08/28/23 0725)    Exam:   Physical Exam  Vitals and nursing note reviewed. Constitutional:       Appearance: She is obese. HENT:      Head: Normocephalic and atraumatic. Nose: Nose normal.      Mouth/Throat:      Mouth: Mucous membranes are moist.      Pharynx: Oropharynx is clear.    Eyes: Pupils: Pupils are equal, round, and reactive to light. Cardiovascular:      Rate and Rhythm: Normal rate. Rhythm irregular. Pulmonary:      Effort: Pulmonary effort is normal. No respiratory distress. Breath sounds: Normal breath sounds. Abdominal:      General: Bowel sounds are normal.      Palpations: Abdomen is soft. Tenderness: There is no abdominal tenderness. Musculoskeletal:      Cervical back: Neck supple. Right lower leg: No edema. Left lower leg: No edema. Skin:     General: Skin is warm and dry. Capillary Refill: Capillary refill takes less than 2 seconds. Comments: No erythema to right pannus. Drainage has decreased to minimal   Neurological:      General: No focal deficit present. Mental Status: She is alert and oriented to person, place, and time. Discussion with Family: Patient declined call to . Discharge instructions/Information to patient and family:   See after visit summary for information provided to patient and family. Provisions for Follow-Up Care:  See after visit summary for information related to follow-up care and any pertinent home health orders. Disposition:   Home    Planned Readmission: No     Discharge Statement:  I spent 50 minutes discharging the patient. This time was spent on the day of discharge. I had direct contact with the patient on the day of discharge. Greater than 50% of the total time was spent examining patient, answering all patient questions, arranging and discussing plan of care with patient as well as directly providing post-discharge instructions. Additional time then spent on discharge activities. Discharge Medications:  See after visit summary for reconciled discharge medications provided to patient and/or family.       **Please Note: This note may have been constructed using a voice recognition system**

## 2023-08-28 NOTE — PROGRESS NOTES
Progress Note - Infectious Disease   Jaxon Dexter 68 y.o. female MRN: [id number]  Unit/Bed#: -01 Encounter: [id number]      Impression/Plan:  1.  Right back, buttock cellulitis and right-sided panniculitis.  Patient was seen earlier this month for similar issue and this was thought to be a secondary cellulitis from a viral process.  She completed antibiotic on 8/16 but had reports of continued drainage in the skin and I suspect ongoing maceration leading now to recurrent cellulitis.  White count has improved.  Exam improving compared to prior images.  Patient reported being on and off diuresis due to blood pressures at home which may have contributed to recurrence. Winona Libman is also question of dermatitis along the back. Wound cultures isolated Pseudomonas with patient improved without antibiotic coverage for this or is not. She completed oral linezolid course on 8/27. No further systemic antibiotic recommended  Continue oral vancomycin prophylaxis for #5 through 8/30  Last dose ordered  Trend fever curve/WBC while admitted  Monitor skin exam  Monitor for tolerance antibiotic  Ongoing follow-up by wound care  Continue topical steroids for the back  Continue aggressive diuresis  Additional supportive care/discharge as per primary     2.  Morbid obesity.  Patient with BMI of 77.  Unfortunately this limits antibiotic delivery and makes dosing of vancomycin also difficult. Completed course of linezolid as above  Supportive care per primary     3.  Antibiotic allergies.  Noted with severe allergies to penicillins and lower generation cephalosporins.  Has tolerated cefepime.  Unfortunately this limits antibiotic options. Completed course of linezolid as above  We will adjust allergy list as needed     4.  Reduced EGFR.  Patient's EGFR noted to be less than 60.  This does affect linezolid dosing.  Dose reduction was utilized. Maintain off further antibiotics as above     5.  History of C.  Difficile.  No active diarrhea currently. Kathy Acosta will continue prophylaxis as above.  Monitor abdominal exam and stool output otherwise.     Above plan discussed in detail with the patient and with primary service    Given plans for discharge today, ID consult service will sign off at this time. Please call if questions.     Antibiotics:  Linezolid completed yesterday  Total antibiotic 7  Vancomycin prophylaxis ongoing    24 Hour events:  Yesterday and overnight notes reviewed no acute events noted    Subjective:  Patient seen at bedside this morning and she denied having any nausea, vomiting, chest pain. She reports significant improvement in drainage output with subsequent nursing evaluations. She reports plans for discharge home. We discussed completion of oral vancomycin prophylaxis course. Additional questions answered. Objective:  Vitals:  Temp:  [97.1 °F (36.2 °C)-98.1 °F (36.7 °C)] 97.1 °F (36.2 °C)  HR:  [64-77] 64  Resp:  [16-18] 16  BP: ()/(49-62) 105/62  SpO2:  [91 %-99 %] 99 %  Temp (24hrs), Av.6 °F (36.4 °C), Min:97.1 °F (36.2 °C), Max:98.1 °F (36.7 °C)  Current: Temperature: (!) 97.1 °F (36.2 °C)    Physical Exam:   General Appearance:  Alert, interactive, nontoxic, no acute distress. Throat: Oropharynx moist without lesions. Lungs:    Diminished breath sounds throughout   Heart:  RRR; no murmur, rub or gallop noted   Abdomen:   Soft, non-tender, non-distended, positive bowel sounds. Extremities: No clubbing, cyanosis or edema; changes of lymphedema in the lower legs   Skin: No new rashes or lesions. No new draining wounds noted. On examination of the patient's upper back as well as her pannus bilaterally overall the erythema has improved. She continues with settling edema. There is an area of erythema and some edema which still appears prominent more in the right upper back under the breast.  Otherwise overall improved compared to prior. Labs, Imaging, & Other studies:    All pertinent labs and imaging studies in PACS were personally reviewed as below. Results from last 7 days   Lab Units 08/27/23  0521 08/26/23  0524 08/25/23  0454   WBC Thousand/uL 8.61 10.28* 9.40   HEMOGLOBIN g/dL 9.7* 9.1* 9.5*   PLATELETS Thousands/uL 498* 520* 452*     Results from last 7 days   Lab Units 08/27/23  0521 08/26/23  0524 08/25/23  0454 08/24/23  0436 08/22/23  0525 08/21/23  1546   POTASSIUM mmol/L 3.7   < > 4.7 4.4   < > 4.7   CHLORIDE mmol/L 98   < > 98 98   < > 97   CO2 mmol/L 35*   < > 32 31   < > 29   BUN mg/dL 27*   < > 25 21   < > 21   CREATININE mg/dL 1.23   < > 1.32* 1.13   < > 1.12   EGFR ml/min/1.73sq m 43   < > 40 48   < > 49   CALCIUM mg/dL 8.4   < > 8.2* 8.4   < > 8.7   AST U/L  --   --  28 16  --  26   ALT U/L  --   --  10 10  --  14   ALK PHOS U/L  --   --  90 100  --  109*    < > = values in this interval not displayed. Results from last 7 days   Lab Units 08/22/23  1840 08/21/23  1546   BLOOD CULTURE   --  No Growth After 5 Days. No Growth After 5 Days. GRAM STAIN RESULT  No Polys or Bacteria seen  --    WOUND CULTURE  1+ Growth of Pseudomonas aeruginosa*  3+ Growth of  --        Lab interpretation/comments: No leukocytosis. Imaging interpretation/comments: No new images    Culture data: Peripheral cultures again isolating Pseudomonas along with skin aristeo. Blood cultures finalized negative yesterday.

## 2023-08-29 NOTE — ASSESSMENT & PLAN NOTE
· Rate is currently controlled  · Continue Coumadin for anticoagulation  · INR is therapeutic    Lab Results   Component Value Date    INR 2.54 (H) 08/28/2023    INR 2.48 (H) 08/27/2023

## 2023-08-29 NOTE — ASSESSMENT & PLAN NOTE
· BMI 77  · Healthy diet and lifestyle modification recommended  · Consider referral to bariatric services

## 2023-08-29 NOTE — ASSESSMENT & PLAN NOTE
Wt Readings from Last 3 Encounters:   08/24/23 (!) 200 kg (439 lb 15.9 oz)   08/10/23 (!) 200 kg (441 lb 9.3 oz)   06/12/23 (!) 199 kg (438 lb 7.9 oz)     · Continue Lasix with low dose midodrine to allow adequate blood pressure for IV diuresis  · Now transitioned to oral diuretic regimen and stable

## 2023-08-29 NOTE — ASSESSMENT & PLAN NOTE
· Patient with recurrent abdominal/right truncal infection. Recent hospitalization hospitalized8/5-8/10 2023 for rash with shingles. She was discharged home on Valtrex, linezolid, oral vancomycin prophylaxis. Has high risk of frequent reinfections due to obesity  · History of MRSA  · Appreciate ID recommendations.  Transitioned to linezolid with last dose 8/27/2023  · Wound care recommendations appreciated

## 2023-09-09 ENCOUNTER — HOSPITAL ENCOUNTER (INPATIENT)
Facility: HOSPITAL | Age: 73
LOS: 9 days | Discharge: HOME HEALTH | DRG: 607 | End: 2023-09-18
Attending: EMERGENCY MEDICINE | Admitting: INTERNAL MEDICINE
Payer: MEDICARE

## 2023-09-09 DIAGNOSIS — I50.32 CHRONIC DIASTOLIC CONGESTIVE HEART FAILURE (HCC): Chronic | ICD-10-CM

## 2023-09-09 DIAGNOSIS — E66.01 CLASS 3 SEVERE OBESITY DUE TO EXCESS CALORIES WITH SERIOUS COMORBIDITY AND BODY MASS INDEX (BMI) GREATER THAN OR EQUAL TO 70 IN ADULT (HCC): Chronic | ICD-10-CM

## 2023-09-09 DIAGNOSIS — L03.90 CELLULITIS: Primary | ICD-10-CM

## 2023-09-09 DIAGNOSIS — L24.A9 WOUND DRAINAGE: ICD-10-CM

## 2023-09-09 DIAGNOSIS — M79.3 PANNICULITIS: ICD-10-CM

## 2023-09-09 DIAGNOSIS — R79.89 ELEVATED PROCALCITONIN: ICD-10-CM

## 2023-09-09 LAB
ALBUMIN SERPL BCP-MCNC: 2.5 G/DL (ref 3.5–5)
ALP SERPL-CCNC: 74 U/L (ref 34–104)
ALT SERPL W P-5'-P-CCNC: 19 U/L (ref 7–52)
ANION GAP SERPL CALCULATED.3IONS-SCNC: 5 MMOL/L
APTT PPP: 51 SECONDS (ref 23–37)
AST SERPL W P-5'-P-CCNC: 29 U/L (ref 13–39)
ATRIAL RATE: 75 BPM
BASOPHILS # BLD AUTO: 0.05 THOUSANDS/ÂΜL (ref 0–0.1)
BASOPHILS NFR BLD AUTO: 1 % (ref 0–1)
BILIRUB SERPL-MCNC: 0.5 MG/DL (ref 0.2–1)
BUN SERPL-MCNC: 24 MG/DL (ref 5–25)
CALCIUM ALBUM COR SERPL-MCNC: 9.1 MG/DL (ref 8.3–10.1)
CALCIUM SERPL-MCNC: 7.9 MG/DL (ref 8.4–10.2)
CHLORIDE SERPL-SCNC: 98 MMOL/L (ref 96–108)
CO2 SERPL-SCNC: 32 MMOL/L (ref 21–32)
CREAT SERPL-MCNC: 1.14 MG/DL (ref 0.6–1.3)
EOSINOPHIL # BLD AUTO: 0.47 THOUSAND/ÂΜL (ref 0–0.61)
EOSINOPHIL NFR BLD AUTO: 5 % (ref 0–6)
ERYTHROCYTE [DISTWIDTH] IN BLOOD BY AUTOMATED COUNT: 17.7 % (ref 11.6–15.1)
GFR SERPL CREATININE-BSD FRML MDRD: 48 ML/MIN/1.73SQ M
GLUCOSE SERPL-MCNC: 108 MG/DL (ref 65–140)
HCT VFR BLD AUTO: 33.7 % (ref 34.8–46.1)
HGB BLD-MCNC: 9.7 G/DL (ref 11.5–15.4)
IMM GRANULOCYTES # BLD AUTO: 0.08 THOUSAND/UL (ref 0–0.2)
IMM GRANULOCYTES NFR BLD AUTO: 1 % (ref 0–2)
INR PPP: 2.22 (ref 0.84–1.19)
LACTATE SERPL-SCNC: 1.1 MMOL/L (ref 0.5–2)
LYMPHOCYTES # BLD AUTO: 1.03 THOUSANDS/ÂΜL (ref 0.6–4.47)
LYMPHOCYTES NFR BLD AUTO: 10 % (ref 14–44)
MCH RBC QN AUTO: 24.3 PG (ref 26.8–34.3)
MCHC RBC AUTO-ENTMCNC: 28.8 G/DL (ref 31.4–37.4)
MCV RBC AUTO: 85 FL (ref 82–98)
MONOCYTES # BLD AUTO: 0.54 THOUSAND/ÂΜL (ref 0.17–1.22)
MONOCYTES NFR BLD AUTO: 5 % (ref 4–12)
NEUTROPHILS # BLD AUTO: 8.37 THOUSANDS/ÂΜL (ref 1.85–7.62)
NEUTS SEG NFR BLD AUTO: 78 % (ref 43–75)
NRBC BLD AUTO-RTO: 0 /100 WBCS
P AXIS: 235 DEGREES
PLATELET # BLD AUTO: 521 THOUSANDS/UL (ref 149–390)
PMV BLD AUTO: 8.8 FL (ref 8.9–12.7)
POTASSIUM SERPL-SCNC: 3.5 MMOL/L (ref 3.5–5.3)
PR INTERVAL: 388 MS
PROCALCITONIN SERPL-MCNC: 0.31 NG/ML
PROT SERPL-MCNC: 7.2 G/DL (ref 6.4–8.4)
PROTHROMBIN TIME: 24.5 SECONDS (ref 11.6–14.5)
QRS AXIS: 16 DEGREES
QRSD INTERVAL: 58 MS
QT INTERVAL: 326 MS
QTC INTERVAL: 375 MS
RBC # BLD AUTO: 3.99 MILLION/UL (ref 3.81–5.12)
SODIUM SERPL-SCNC: 135 MMOL/L (ref 135–147)
T WAVE AXIS: 153 DEGREES
VENTRICULAR RATE: 80 BPM
WBC # BLD AUTO: 10.54 THOUSAND/UL (ref 4.31–10.16)

## 2023-09-09 PROCEDURE — 96374 THER/PROPH/DIAG INJ IV PUSH: CPT

## 2023-09-09 PROCEDURE — 87040 BLOOD CULTURE FOR BACTERIA: CPT | Performed by: EMERGENCY MEDICINE

## 2023-09-09 PROCEDURE — 84145 PROCALCITONIN (PCT): CPT | Performed by: EMERGENCY MEDICINE

## 2023-09-09 PROCEDURE — 99223 1ST HOSP IP/OBS HIGH 75: CPT | Performed by: INTERNAL MEDICINE

## 2023-09-09 PROCEDURE — 99285 EMERGENCY DEPT VISIT HI MDM: CPT | Performed by: EMERGENCY MEDICINE

## 2023-09-09 PROCEDURE — 83605 ASSAY OF LACTIC ACID: CPT | Performed by: EMERGENCY MEDICINE

## 2023-09-09 PROCEDURE — 85025 COMPLETE CBC W/AUTO DIFF WBC: CPT | Performed by: EMERGENCY MEDICINE

## 2023-09-09 PROCEDURE — 36415 COLL VENOUS BLD VENIPUNCTURE: CPT | Performed by: EMERGENCY MEDICINE

## 2023-09-09 PROCEDURE — 93010 ELECTROCARDIOGRAM REPORT: CPT | Performed by: INTERNAL MEDICINE

## 2023-09-09 PROCEDURE — 85610 PROTHROMBIN TIME: CPT | Performed by: EMERGENCY MEDICINE

## 2023-09-09 PROCEDURE — 99283 EMERGENCY DEPT VISIT LOW MDM: CPT

## 2023-09-09 PROCEDURE — 80053 COMPREHEN METABOLIC PANEL: CPT | Performed by: EMERGENCY MEDICINE

## 2023-09-09 PROCEDURE — 85730 THROMBOPLASTIN TIME PARTIAL: CPT | Performed by: EMERGENCY MEDICINE

## 2023-09-09 PROCEDURE — 93005 ELECTROCARDIOGRAM TRACING: CPT

## 2023-09-09 RX ORDER — DOCUSATE SODIUM 100 MG/1
100 CAPSULE, LIQUID FILLED ORAL DAILY
Status: DISCONTINUED | OUTPATIENT
Start: 2023-09-09 | End: 2023-09-18 | Stop reason: HOSPADM

## 2023-09-09 RX ORDER — ACETAMINOPHEN 325 MG/1
650 TABLET ORAL EVERY 4 HOURS PRN
Status: DISCONTINUED | OUTPATIENT
Start: 2023-09-09 | End: 2023-09-18 | Stop reason: HOSPADM

## 2023-09-09 RX ORDER — WARFARIN SODIUM 2 MG/1
2 TABLET ORAL
Status: DISCONTINUED | OUTPATIENT
Start: 2023-09-09 | End: 2023-09-18 | Stop reason: HOSPADM

## 2023-09-09 RX ORDER — CEFEPIME HYDROCHLORIDE 2 G/50ML
2000 INJECTION, SOLUTION INTRAVENOUS ONCE
Status: COMPLETED | OUTPATIENT
Start: 2023-09-09 | End: 2023-09-09

## 2023-09-09 RX ORDER — NYSTATIN 100000 [USP'U]/G
POWDER TOPICAL 2 TIMES DAILY
Status: DISCONTINUED | OUTPATIENT
Start: 2023-09-09 | End: 2023-09-18 | Stop reason: HOSPADM

## 2023-09-09 RX ORDER — ENOXAPARIN SODIUM 100 MG/ML
40 INJECTION SUBCUTANEOUS DAILY
Status: DISCONTINUED | OUTPATIENT
Start: 2023-09-09 | End: 2023-09-11

## 2023-09-09 RX ORDER — WARFARIN SODIUM 2 MG/1
4 TABLET ORAL
Status: DISCONTINUED | OUTPATIENT
Start: 2023-09-12 | End: 2023-09-18 | Stop reason: HOSPADM

## 2023-09-09 RX ORDER — DULOXETIN HYDROCHLORIDE 20 MG/1
20 CAPSULE, DELAYED RELEASE ORAL DAILY
Status: DISCONTINUED | OUTPATIENT
Start: 2023-09-09 | End: 2023-09-18 | Stop reason: HOSPADM

## 2023-09-09 RX ORDER — GABAPENTIN 100 MG/1
100 CAPSULE ORAL 3 TIMES DAILY
Status: DISCONTINUED | OUTPATIENT
Start: 2023-09-09 | End: 2023-09-18 | Stop reason: HOSPADM

## 2023-09-09 RX ORDER — MIDODRINE HYDROCHLORIDE 5 MG/1
2.5 TABLET ORAL
Status: DISCONTINUED | OUTPATIENT
Start: 2023-09-09 | End: 2023-09-18 | Stop reason: HOSPADM

## 2023-09-09 RX ORDER — CEFEPIME HYDROCHLORIDE 2 G/50ML
2000 INJECTION, SOLUTION INTRAVENOUS EVERY 12 HOURS
Status: DISCONTINUED | OUTPATIENT
Start: 2023-09-10 | End: 2023-09-12

## 2023-09-09 RX ORDER — FUROSEMIDE 40 MG/1
40 TABLET ORAL DAILY
Status: DISCONTINUED | OUTPATIENT
Start: 2023-09-09 | End: 2023-09-13

## 2023-09-09 RX ORDER — ONDANSETRON 2 MG/ML
4 INJECTION INTRAMUSCULAR; INTRAVENOUS EVERY 6 HOURS PRN
Status: DISCONTINUED | OUTPATIENT
Start: 2023-09-09 | End: 2023-09-18 | Stop reason: HOSPADM

## 2023-09-09 RX ORDER — ALLOPURINOL 100 MG/1
100 TABLET ORAL DAILY
Status: DISCONTINUED | OUTPATIENT
Start: 2023-09-09 | End: 2023-09-18 | Stop reason: HOSPADM

## 2023-09-09 RX ADMIN — WARFARIN SODIUM 2 MG: 2 TABLET ORAL at 17:36

## 2023-09-09 RX ADMIN — ALLOPURINOL 100 MG: 100 TABLET ORAL at 15:32

## 2023-09-09 RX ADMIN — DULOXETINE HYDROCHLORIDE 20 MG: 20 CAPSULE, DELAYED RELEASE ORAL at 15:32

## 2023-09-09 RX ADMIN — NYSTATIN 1 APPLICATION: 100000 POWDER TOPICAL at 17:36

## 2023-09-09 RX ADMIN — VANCOMYCIN HYDROCHLORIDE 1750 MG: 1 INJECTION, POWDER, LYOPHILIZED, FOR SOLUTION INTRAVENOUS at 15:15

## 2023-09-09 RX ADMIN — GABAPENTIN 100 MG: 100 CAPSULE ORAL at 15:32

## 2023-09-09 RX ADMIN — MIDODRINE HYDROCHLORIDE 2.5 MG: 5 TABLET ORAL at 15:32

## 2023-09-09 RX ADMIN — GABAPENTIN 100 MG: 100 CAPSULE ORAL at 21:40

## 2023-09-09 RX ADMIN — CEFEPIME HYDROCHLORIDE 2000 MG: 2 INJECTION, SOLUTION INTRAVENOUS at 14:13

## 2023-09-09 NOTE — ASSESSMENT & PLAN NOTE
· Patient appears rate controlled not on AV kiko blockade  · Continue home Coumadin regimen  · INR therapeutic  · Monitor heart rates

## 2023-09-09 NOTE — ASSESSMENT & PLAN NOTE
· Patient presents with bruising and worsening erythema of her right flank and leg  · Procalcitonin slightly elevated  · Afebrile and without significant leukocytosis  · Patient with numerous antibiotic allergies  · Initiated on vancomycin and cefepime ; pharmacy consult for trough management  · Trend fever curve and WBC count  · De-escalate antibiotics as appropriate

## 2023-09-09 NOTE — PLAN OF CARE
Problem: MOBILITY - ADULT  Goal: Maintain or return to baseline ADL function  Description: INTERVENTIONS:  -  Assess patient's ability to carry out ADLs; assess patient's baseline for ADL function and identify physical deficits which impact ability to perform ADLs (bathing, care of mouth/teeth, toileting, grooming, dressing, etc.)  - Assess/evaluate cause of self-care deficits   - Assess range of motion  - Assess patient's mobility; develop plan if impaired  - Assess patient's need for assistive devices and provide as appropriate  - Encourage maximum independence but intervene and supervise when necessary  - Involve family in performance of ADLs  - Assess for home care needs following discharge   - Consider OT consult to assist with ADL evaluation and planning for discharge  - Provide patient education as appropriate.   Outcome: Progressing     Problem: INFECTION - ADULT  Goal: Absence or prevention of progression during hospitalization  Description: INTERVENTIONS:  - Assess and monitor for signs and symptoms of infection  - Monitor lab/diagnostic results  - Monitor all insertion sites, i.e. indwelling lines, tubes, and drains  - Monitor endotracheal if appropriate and nasal secretions for changes in amount and color  - [place] appropriate cooling/warming therapies per order  - Administer medications as ordered  - Instruct and encourage patient and family to use good hand hygiene technique  - Identify and instruct in appropriate isolation precautions for identified infection/condition  Outcome: Progressing     Problem: METABOLIC, FLUID AND ELECTROLYTES - ADULT  Goal: Electrolytes maintained within normal limits  Description: INTERVENTIONS:  - Monitor labs and assess patient for signs and symptoms of electrolyte imbalances  - Administer electrolyte replacement as ordered  - Monitor response to electrolyte replacements, including repeat lab results as appropriate  - Instruct patient on fluid and nutrition as appropriate  Outcome: Progressing

## 2023-09-09 NOTE — H&P
[id number] [facility]  H&P  Name: Brigid Patten 68 y.o. female I MRN: [id number]  Unit/Bed#: -01 I Date of Admission: 9/9/2023   Date of Service: 9/9/2023 I Hospital Day: 0      Assessment/Plan   * Cellulitis  Assessment & Plan  · Patient presents with bruising and worsening erythema of her right flank and leg  · Procalcitonin slightly elevated  · Afebrile and without significant leukocytosis  · Patient with numerous antibiotic allergies  · Initiated on vancomycin and cefepime ; pharmacy consult for trough management  · Trend fever curve and WBC count  · De-escalate antibiotics as appropriate    Chronic diastolic congestive heart failure ([address])  Assessment & Plan  · Patient euvolemic at this time  · Continue home Lasix with hold parameters  · Monitor volume status      Other specified hypothyroidism  Assessment & Plan  · Continue home levothyroxine 125 mcg daily    Mild episode of recurrent major depressive disorder (HCC)  Assessment & Plan  · Continue home Cymbalta  · Mood appears stable    Class 3 severe obesity due to excess calories with serious comorbidity and body mass index (BMI) greater than or equal to 70 in [facility])  Assessment & Plan  · Present on admission as evidenced by BMI of 76  · Encouraged lifestyle modifications and weight loss  · Calorie restricted diet    Idiopathic chronic 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  Assessment & Plan  · Continue home allopurinol    Chronic atrial fibrillation ([address])  Assessment & Plan  · Patient appears rate controlled not on AV kiko blockade  · Continue home Coumadin regimen  · INR therapeutic  · Monitor heart rates       VTE Prophylaxis: Coumadin  Code Status: Level 1 Full Code    Anticipated Length of Stay:  Patient will be admitted on an Inpatient basis with an anticipated length of stay of greater than 2 midnights. Justification for Hospital Stay: Cellulitis    Total Time for Visit, including Counseling / Coordination of Care:  I have spent a total time of 45 minutes on 09/09/23 in caring for this patient including Diagnostic results, Prognosis, Risks and benefits of tx options, Instructions for management, Patient and family education, Importance of tx compliance, Risk factor reductions, Impressions, Counseling / Coordination of care, Documenting in the medical record, Reviewing / ordering tests, medicine, procedures  , Obtaining or reviewing history   and Communicating with other healthcare professionals . Chief Complaint:   Erythema of right flank and leg    History of Present Illness:    Brigid Patten is a 68 y.o. female with a past medical history significant for atrial fibrillation on Coumadin, morbid obesity, depression, hypothyroidism, HFpEF, idiopathic chronic gout who presents with complaints of worsening erythema and oozing from her right flank and leg. The patient endorses subjective low-grade fevers at home and difficulty tolerating food along with nausea and vomiting. The patient was recently discharged from the hospital approximately 2 weeks ago. In the ED, all vital signs were found to be stable. No significant leukocytosis. Of note, the patient has multiple allergies to antibiotics and ID was consulted in the ED and recommended vancomycin and cefepime. Review of Systems:    Review of Systems   Constitutional: Negative for chills and fever. HENT: Negative for ear pain and sore throat. Eyes: Negative for pain and visual disturbance. Respiratory: Negative for cough and shortness of breath. Cardiovascular: Negative for chest pain and palpitations. Gastrointestinal: Negative for abdominal pain and vomiting. Genitourinary: Negative for dysuria and hematuria. Musculoskeletal: Negative for arthralgias and back pain. Skin: Negative for color change and rash. Neurological: Negative for seizures and syncope. All other systems reviewed and are negative.       Past Medical and Surgical History: Past Medical History:   Diagnosis Date   • Atrial fibrillation [facility])    • Bladder stone    • C. difficile colitis    • Cellulitis    • CHF (congestive heart failure) ([facility])    • Depression with anxiety    • Disease of thyroid gland    • Diverticulitis    • Enterocolitis    • History of abnormal cervical Pap smear    • Kidney stone    • Lymphedema    • Menopause     Age 53   • Morbid obesity with BMI of 70 and over, adult ([address])    • MRSA (methicillin resistant Staphylococcus aureus)     abdominal wound   • Osteoarthritis    • Phlebitis     left lower leg   • Spondylolysis        Past Surgical History:   Procedure Laterality Date   • APPENDECTOMY     • CARPAL TUNNEL RELEASE     • CHOLECYSTECTOMY     • CYSTOSCOPY      stent   • FOOT SURGERY  1982    bone spur   • KNEE SURGERY     • WISDOM TOOTH EXTRACTION         Meds/Allergies:    Prior to Admission medications    Medication Sig Start Date End Date Taking?  Authorizing Provider   acetaminophen (TYLENOL) 650 mg CR tablet Take 650 mg by mouth every 8 (eight) hours as needed (for osteoarthritis)    Historical Provider, MD   allopurinol (ZYLOPRIM) 100 mg tablet Take 1 tablet (100 mg total) by mouth daily 9/30/19   Savi Fried DO   docusate sodium (COLACE) 100 mg capsule Take 1 capsule (100 mg total) by mouth daily Do not start before August 29, 2023. 8/29/23   ORACIO Dodd   DULoxetine (CYMBALTA) 20 mg capsule Take 1 capsule (20 mg total) by mouth daily 8/15/18   Savi Fried DO   furosemide (LASIX) 40 mg tablet Take 40 mg by mouth daily Hold for SBP < 100    Historical Provider, MD   gabapentin (NEURONTIN) 100 mg capsule Take 1 capsule (100 mg total) by mouth 3 (three) times a day 8/10/23 9/9/23  ORACIO Last   levothyroxine 125 mcg tablet Take 125 mcg by mouth daily    Historical Provider, MD   midodrine (PROAMATINE) 2.5 mg tablet Take 1 tablet (2.5 mg total) by mouth 3 (three) times a day before meals 8/28/23 9/27/23  Farzana Garcia triamcinolone (KENALOG) 0.1 % cream Apply 0.1 application topically 2 (two) times a day 21   Historical Provider, MD   warfarin (COUMADIN) 2 mg tablet Take 2 mg by mouth once daily on , Monday, Wednesday, Friday, and 23   ORACIO Carbajal   warfarin (COUMADIN) 2 mg tablet Take 4 mg once daily on Tuesday and Thursday Do not start before 2023. 23   ORACIO Carbajal   nystatin (MYCOSTATIN) powder Apply topically 2 (two) times a day 21  Christopher Davenport MD       Allergies: Allergies   Allergen Reactions   • Augmentin Es-600 [Amoxicillin-Pot Clavulanate] Anaphylaxis and Rash   • Bactrim [Sulfamethoxazole-Trimethoprim] Anaphylaxis   • Cefazolin Itching and Other (See Comments)     Patient developed itching and difficulty swallowing following IV cefazolin administration at [facility] which required treatment with benadryl and epinephrine - has tolerated other cephalosporins with different side chains including cefepime, cefuroxime, and cephalexin   • Keflex [Cephalexin] Anaphylaxis   • Penicillins Anaphylaxis and Rash     Tolerates cefepime (21)   • Other Rash, Irritability and Edema     Patient reports significant reaction to the use of Max Orb in the abdominal folds leading to swelling, excoriation, maceration and weeping of the skin.     • Omeprazole GI Intolerance   • Sulfa Antibiotics Hives   • Latex Rash and Edema   • Vitamin C [Ascorbate - Food Allergy] Rash       Social History:     Marital Status: Single   Substance Use History:   Social History     Substance and Sexual Activity   Alcohol Use Not Currently     Social History     Tobacco Use   Smoking Status Former   • Packs/day: 5.00   • Years: 3.00   • Total pack years: 15.00   • Types: Cigarettes   • Start date: 1967   • Quit date: 1970   • Years since quittin.2   Smokeless Tobacco Never   Tobacco Comments    5 packs/day until 5 (age [age]) - As per [employer]      Social History     Substance and Sexual Activity   Drug Use Not Currently   • Types: Marijuana    Comment: As a teen - As per [employer]        Family History:    Pertinent family history reviewed    Physical Exam:     Vitals:   Blood Pressure: 105/61 (09/09/23 1454)  Pulse: 66 (09/09/23 1454)  Temperature: 98 °F (36.7 °C) (09/09/23 1454)  Temp Source: Oral (09/09/23 1248)  Respirations: 15 (09/09/23 1454)  Weight - Scale: (!) 196 kg (431 lb) (09/09/23 1248)  SpO2: 97 % (09/09/23 1454)    Physical Exam  Vitals and nursing note reviewed. Constitutional:       General: She is not in acute distress. Appearance: She is well-developed. She is obese. HENT:      Head: Normocephalic and atraumatic. Eyes:      Conjunctiva/sclera: Conjunctivae normal.   Cardiovascular:      Rate and Rhythm: Normal rate and regular rhythm. Heart sounds: No murmur heard. Pulmonary:      Effort: Pulmonary effort is normal. No respiratory distress. Breath sounds: Normal breath sounds. Abdominal:      Palpations: Abdomen is soft. Tenderness: There is no abdominal tenderness. Musculoskeletal:         General: No swelling. Cervical back: Neck supple. Skin:     General: Skin is warm and dry. Capillary Refill: Capillary refill takes less than 2 seconds. Findings: Erythema present. Neurological:      Mental Status: She is alert.    Psychiatric:         Mood and Affect: Mood normal.          Additional Data:     Lab Results: I have reviewed pertinent results     Results from last 7 days   Lab Units 09/09/23  1325   WBC Thousand/uL 10.54*   HEMOGLOBIN g/dL 9.7*   HEMATOCRIT % 33.7*   PLATELETS Thousands/uL 521*   NEUTROS PCT % 78*   LYMPHS PCT % 10*   MONOS PCT % 5   EOS PCT % 5     Results from last 7 days   Lab Units 09/09/23  1325   SODIUM mmol/L 135   POTASSIUM mmol/L 3.5   CHLORIDE mmol/L 98   CO2 mmol/L 32   BUN mg/dL 24   CREATININE mg/dL 1.14   ANION GAP mmol/L 5   CALCIUM mg/dL 7.9*   ALBUMIN g/dL 2.5* TOTAL BILIRUBIN mg/dL 0.50   ALK PHOS U/L 74   ALT U/L 19   AST U/L 29   GLUCOSE RANDOM mg/dL 108     Results from last 7 days   Lab Units 09/09/23  1325   INR  2.22*             Results from last 7 days   Lab Units 09/09/23  1325   LACTIC ACID mmol/L 1.1   PROCALCITONIN ng/ml 0.31*       Imaging: I have reviewed pertinent imaging     No orders to display       EKG, Pathology, and Other Studies Reviewed on Admission:   · EKG: NSR    Allscripts / Epic Records Reviewed    ** Please Note: This note has been constructed using a voice recognition system.  **

## 2023-09-09 NOTE — ED PROVIDER NOTES
History  Chief Complaint   Patient presents with   • Cellulitis     Ongoing. Recent hospitalization. Oozing/pain. Needs IV abx     71-year-old female presenting to the ED for worsening cellulitis and oozing of the right flank and right upper leg. Patient states she has been running a low-grade fever along with difficulty tolerating food by mouth with nausea and vomiting. States that she was able to keep some jelly toast down today. Denies any other symptoms or issues at this time. Patient was recently admitted to hospital discharge approximately 7 to 10 days ago. Prior to Admission Medications   Prescriptions Last Dose Informant Patient Reported? Taking? DULoxetine (CYMBALTA) 20 mg capsule  Self No No   Sig: Take 1 capsule (20 mg total) by mouth daily   acetaminophen (TYLENOL) 650 mg CR tablet  Self Yes No   Sig: Take 650 mg by mouth every 8 (eight) hours as needed (for osteoarthritis)   allopurinol (ZYLOPRIM) 100 mg tablet  Self No No   Sig: Take 1 tablet (100 mg total) by mouth daily   docusate sodium (COLACE) 100 mg capsule   No No   Sig: Take 1 capsule (100 mg total) by mouth daily Do not start before August 29, 2023.   furosemide (LASIX) 40 mg tablet  Self Yes No   Sig: Take 40 mg by mouth daily Hold for SBP < 100   gabapentin (NEURONTIN) 100 mg capsule   No No   Sig: Take 1 capsule (100 mg total) by mouth 3 (three) times a day   levothyroxine 125 mcg tablet  Self Yes No   Sig: Take 125 mcg by mouth daily   midodrine (PROAMATINE) 2.5 mg tablet   No No   Sig: Take 1 tablet (2.5 mg total) by mouth 3 (three) times a day before meals   triamcinolone (KENALOG) 0.1 % cream  Self Yes No   Sig: Apply 0.1 application topically 2 (two) times a day   warfarin (COUMADIN) 2 mg tablet   No No   Sig: Take 2 mg by mouth once daily on Sunday, Monday, Wednesday, Friday, and Saturday   warfarin (COUMADIN) 2 mg tablet   No No   Sig: Take 4 mg once daily on Tuesday and Thursday Do not start before August 29, 2023. Facility-Administered Medications: None       Past Medical History:   Diagnosis Date   • Atrial fibrillation ([address])    • Bladder stone    • C. difficile colitis    • Cellulitis    • CHF (congestive heart failure) ([facility])    • Depression with anxiety    • Disease of thyroid gland    • Diverticulitis    • Enterocolitis    • History of abnormal cervical Pap smear    • Kidney stone    • Lymphedema    • Menopause     Age 53   • Morbid obesity with BMI of 70 and over, adult ([address])    • MRSA (methicillin resistant Staphylococcus aureus)     abdominal wound   • Osteoarthritis    • Phlebitis     left lower leg   • Spondylolysis        Past Surgical History:   Procedure Laterality Date   • APPENDECTOMY     • CARPAL TUNNEL RELEASE     • CHOLECYSTECTOMY     • CYSTOSCOPY      stent   • FOOT SURGERY      bone spur   • KNEE SURGERY     • WISDOM TOOTH EXTRACTION         Family History   Problem Relation Age of Onset   • Heart failure Mother    • Heart disease Mother    • Lymphoma Mother    • Seizures Mother    • Kidney disease Father    • Heart disease Father    • Heart failure Father    • Diabetes type II Father    • Diabetes type II Sister    • Diabetes type II Brother    • Uterine cancer Paternal Aunt    • Breast cancer Neg Hx    • Colon cancer Neg Hx    • Ovarian cancer Neg Hx      I have reviewed and agree with the history as documented.     E-Cigarette/Vaping   • E-Cigarette Use Never User      E-Cigarette/Vaping Substances   • Nicotine No    • THC No    • CBD No    • Flavoring No    • Other No    • Unknown No      Social History     Tobacco Use   • Smoking status: Former     Packs/day: 5.00     Years: 3.00     Total pack years: 15.00     Types: Cigarettes     Start date: 1967     Quit date: 1970     Years since quittin.2   • Smokeless tobacco: Never   • Tobacco comments:     5 packs/day until 5 (age 17-19) - As per [employer]    Vaping Use   • Vaping Use: Never used   Substance Use Topics   • Alcohol use: Not Currently   • Drug use: Not Currently     Types: Marijuana     Comment: As a teen - As per [employer]        Review of Systems   Constitutional: Positive for appetite change and fever. Gastrointestinal: Positive for nausea and vomiting. Skin: Positive for color change and wound. All other systems reviewed and are negative. Physical Exam  Physical Exam  Vitals and nursing note reviewed. Constitutional:       General: She is not in acute distress. Appearance: She is well-developed. She is not diaphoretic. HENT:      Head: Normocephalic and atraumatic. Right Ear: External ear normal.      Left Ear: External ear normal.      Nose: Nose normal.   Eyes:      General: No scleral icterus. Right eye: No discharge. Left eye: No discharge. Conjunctiva/sclera: Conjunctivae normal.      Pupils: Pupils are equal, round, and reactive to light. Neck:      Vascular: No JVD. Trachea: No tracheal deviation. Cardiovascular:      Rate and Rhythm: Normal rate and regular rhythm. Heart sounds: Normal heart sounds. No murmur heard. No friction rub. No gallop. Pulmonary:      Effort: Pulmonary effort is normal. No respiratory distress. Breath sounds: Normal breath sounds. No stridor. No wheezing or rales. Abdominal:      General: Bowel sounds are normal. There is no distension. Palpations: Abdomen is soft. There is no mass. Tenderness: There is no abdominal tenderness. There is no guarding. Musculoskeletal:         General: No tenderness or deformity. Normal range of motion. Cervical back: Normal range of motion and neck supple. Skin:     General: Skin is warm and dry. Coloration: Skin is not pale. Findings: Erythema present. No rash. Comments: Large area of erythema and induration from the patient's right flank down into the right thigh. No obvious fluctuance however there is diffuse serous drainage from multiple areas.   Patient stating very tender. Area does feel warm to touch. No crepitus on examination. Neurological:      Mental Status: She is alert and oriented to person, place, and time. Cranial Nerves: No cranial nerve deficit. Sensory: No sensory deficit. Motor: No abnormal muscle tone. Psychiatric:         Behavior: Behavior normal.         Thought Content: Thought content normal.         Judgment: Judgment normal.         Vital Signs  ED Triage Vitals [09/09/23 1248]   Temperature Pulse Respirations Blood Pressure SpO2   97.6 °F (36.4 °C) 68 19 96/51 93 %      Temp Source Heart Rate Source Patient Position - Orthostatic VS BP Location FiO2 (%)   Oral Monitor Lying Left arm --      Pain Score       --           Vitals:    09/09/23 1248   BP: 96/51   Pulse: 68   Patient Position - Orthostatic VS: Lying         Visual Acuity      ED Medications  Medications   vancomycin (VANCOCIN) 1,750 mg in sodium chloride 0.9 % 500 mL IVPB (has no administration in time range)   cefepime (MAXIPIME) IVPB (premix in dextrose) 2,000 mg 50 mL (2,000 mg Intravenous New Bag 9/9/23 1413)       Diagnostic Studies  Results Reviewed     Procedure Component Value Units Date/Time    Procalcitonin [[id number]]  (Abnormal) Collected: 09/09/23 1325    Lab Status: Final result Specimen: Blood from Arm, Right Updated: 09/09/23 1408     Procalcitonin 0.31 ng/ml     Lactic acid [[id number]]  (Normal) Collected: 09/09/23 1325    Lab Status: Final result Specimen: Blood from Arm, Right Updated: 09/09/23 1402     LACTIC ACID 1.1 mmol/L     Narrative:      Result may be elevated if tourniquet was used during collection.     Comprehensive metabolic panel [[id number]]  (Abnormal) Collected: 09/09/23 1325    Lab Status: Final result Specimen: Blood from Arm, Right Updated: 09/09/23 1402     Sodium 135 mmol/L      Potassium 3.5 mmol/L      Chloride 98 mmol/L      CO2 32 mmol/L      ANION GAP 5 mmol/L      BUN 24 mg/dL      Creatinine 1.14 mg/dL      Glucose 108 mg/dL      Calcium 7.9 mg/dL      Corrected Calcium 9.1 mg/dL      AST 29 U/L      ALT 19 U/L      Alkaline Phosphatase 74 U/L      Total Protein 7.2 g/dL      Albumin 2.5 g/dL      Total Bilirubin 0.50 mg/dL      eGFR 48 ml/min/1.73sq m     Narrative:      [facility] guidelines for Chronic Kidney Disease (CKD):   •  Stage 1 with normal or high GFR (GFR > 90 mL/min/1.73 square meters)  •  Stage 2 Mild CKD (GFR = 60-89 mL/min/1.73 square meters)  •  Stage 3A Moderate CKD (GFR = 45-59 mL/min/1.73 square meters)  •  Stage 3B Moderate CKD (GFR = 30-44 mL/min/1.73 square meters)  •  Stage 4 Severe CKD (GFR = 15-29 mL/min/1.73 square meters)  •  Stage 5 End Stage CKD (GFR <15 mL/min/1.73 square meters)  Note: GFR calculation is accurate only with a steady state creatinine    Protime-INR [[id number]]  (Abnormal) Collected: 09/09/23 1325    Lab Status: Final result Specimen: Blood from Arm, Right Updated: 09/09/23 1400     Protime 24.5 seconds      INR 2.22    APTT [[id number]]  (Abnormal) Collected: 09/09/23 1325    Lab Status: Final result Specimen: Blood from Arm, Right Updated: 09/09/23 1400     PTT 51 seconds     CBC and differential [[id number]]  (Abnormal) Collected: 09/09/23 1325    Lab Status: Final result Specimen: Blood from Arm, Right Updated: 09/09/23 1341     WBC 10.54 Thousand/uL      RBC 3.99 Million/uL      Hemoglobin 9.7 g/dL      Hematocrit 33.7 %      MCV 85 fL      MCH 24.3 pg      MCHC 28.8 g/dL      RDW 17.7 %      MPV 8.8 fL      Platelets 239 Thousands/uL      nRBC 0 /100 WBCs      Neutrophils Relative 78 %      Immat GRANS % 1 %      Lymphocytes Relative 10 %      Monocytes Relative 5 %      Eosinophils Relative 5 %      Basophils Relative 1 %      Neutrophils Absolute 8.37 Thousands/µL      Immature Grans Absolute 0.08 Thousand/uL      Lymphocytes Absolute 1.03 Thousands/µL      Monocytes Absolute 0.54 Thousand/µL      Eosinophils Absolute 0.47 Thousand/µL      Basophils Absolute 0.05 Thousands/µL     Blood culture #2 [[id number]] Collected: 09/09/23 1325    Lab Status: In process Specimen: Blood from Arm, Right Updated: 09/09/23 1339    Blood culture #1 [[id number]] Collected: 09/09/23 1331    Lab Status: In process Specimen: Blood from Arm, Left Updated: 09/09/23 1338                 No orders to display              Procedures  ECG 12 Lead Documentation Only    Date/Time: 9/9/2023 2:23 PM    Performed by: Sangeetha Franco DO  Authorized by: Sangeetha Franco DO    Interpretation:     Interpretation: abnormal    Quality:     Tracing quality:  Limited by artifact  Rate:     ECG rate:  80    ECG rate assessment: normal    Rhythm:     Rhythm: sinus rhythm    Ectopy:     Ectopy: none    QRS:     QRS axis:  Normal    QRS intervals:  Normal  Conduction:     Conduction: abnormal      Abnormal conduction: 1st degree    ST segments:     ST segments:  Normal  T waves:     T waves: normal               ED Course  ED Course as of 09/09/23 1421   Sat Sep 09, 2023   1410 Case discussed with infectious disease as patient with complicated antibiotic history with allergies and multidrug-resistant organisms and they recommended vanc/cefepime. [facility]-Infectious Disease-AP Call [facility]))  [employer]  Yes she tolerates vanco cefepime  1:35 PM  20 days left                               SBIRT 22yo+    Flowsheet Row Most Recent Value   Initial Alcohol Screen: US AUDIT-C     1. How often do you have a drink containing alcohol? 0 Filed at: 09/09/2023 1248   2. How many drinks containing alcohol do you have on a typical day you are drinking? 0 Filed at: 09/09/2023 1248   3a. Male UNDER 65: How often do you have five or more drinks on one occasion? 0 Filed at: 09/09/2023 1248   3b. FEMALE Any Age, or MALE 65+: How often do you have 4 or more drinks on one occassion?  0 Filed at: 09/09/2023 1248   Audit-C Score 0 Filed at: 09/09/2023 1248   JYOTI: How many times in the past year have you. .. Used an illegal drug or used a prescription medication for non-medical reasons? Never Filed at: 09/09/2023 1248                    Medical Decision Making  72-year-old female with multiple antibiotic allergies, history of MDRO, history of C. difficile with recent hospital admission  Patient with significant area of cellulitis with profuse amount of serous drainage. Septic work-up ordered with mild increase of white blood cell count from patient's baseline along with an elevated procalcitonin. As patient recently on antibiotics and now with recurrence and worsening of wounds with cellulitis and drainage patient to be admitted to medicine. Case was discussed with infectious disease who recommended vancomycin and cefepime at this time. Antibiotics ordered. Patient declining pain medications on initial evaluation in the emergency department. Amount and/or Complexity of Data Reviewed  Labs: ordered. Risk  Prescription drug management. Decision regarding hospitalization. Disposition  Final diagnoses:   Cellulitis   Elevated procalcitonin   Wound drainage     Time reflects when diagnosis was documented in both MDM as applicable and the Disposition within this note     Time User Action Codes Description Comment    9/9/2023  2:19 PM Kiera Recinos Add [L03.90] Cellulitis     9/9/2023  2:19 PM Dasia Cervantes [R79.89] Elevated procalcitonin     9/9/2023  2:19 PM Yumi Pena.Tashi. A9] Wound drainage       ED Disposition     ED Disposition   Admit    Condition   Stable    Date/Time   Sat Sep 9, 2023  2:19 PM    Comment   Case was discussed with BRITANY and the patient's admission status was agreed to be Admission Status: inpatient status to the service of Dr. Viridiana Combs. Follow-up Information    None         Patient's Medications   Discharge Prescriptions    No medications on file       No discharge procedures on file.     PDMP Review       Value Time User    PDMP Reviewed  Yes 5/27/2022 11:10 PM Anjelica Lara PA-C          ED Provider  Electronically Signed by           Quyen Daily DO  09/09/23 1421       Quyen Daily,   09/09/23 1424

## 2023-09-09 NOTE — ASSESSMENT & PLAN NOTE
· Present on admission as evidenced by BMI of 76  · Encouraged lifestyle modifications and weight loss  · Calorie restricted diet

## 2023-09-09 NOTE — ASSESSMENT & PLAN NOTE
· Patient euvolemic at this time  · Continue home Lasix with hold parameters  · Monitor volume status

## 2023-09-09 NOTE — CONSULTS
Rolanda Marie is a 68 y.o. female who is currently ordered Vancomycin IV with management by the Pharmacy Consult service. Relevant clinical data and objective / subjective history reviewed. Vancomycin Assessment:  Indication and Goal AUC/Trough: Soft tissue (goal -600, trough >10), -600, trough >10  Clinical Status: stable  Micro: In progress  Renal Function:  SCr: 1.14 mg/dL  CrCl: 78.5 mL/min  Renal replacement: Not on dialysis  Days of Therapy: 1  Current Dose: 1750 mg IV X 1 dose then 1500 mg IV Q 24 hrs  Vancomycin Plan:  Estimated AUC: 501 mcg*hr/mL  Estimated Trough: 11.9 mcg/mL  Next Level: 9/10 with am labs  Renal Function Monitoring: Daily BMP and [place] will continue to follow closely for s/sx of nephrotoxicity, infusion reactions and appropriateness of therapy. BMP and CBC will be ordered per protocol. We will continue to follow the patient’s culture results and clinical progress daily.     Nelson Wick, Pharmacist

## 2023-09-10 LAB
ANION GAP SERPL CALCULATED.3IONS-SCNC: 7 MMOL/L
BASOPHILS # BLD AUTO: 0.06 THOUSANDS/ÂΜL (ref 0–0.1)
BASOPHILS NFR BLD AUTO: 1 % (ref 0–1)
BUN SERPL-MCNC: 23 MG/DL (ref 5–25)
CALCIUM SERPL-MCNC: 7.9 MG/DL (ref 8.4–10.2)
CHLORIDE SERPL-SCNC: 100 MMOL/L (ref 96–108)
CO2 SERPL-SCNC: 29 MMOL/L (ref 21–32)
CREAT SERPL-MCNC: 1.21 MG/DL (ref 0.6–1.3)
EOSINOPHIL # BLD AUTO: 0.49 THOUSAND/ÂΜL (ref 0–0.61)
EOSINOPHIL NFR BLD AUTO: 4 % (ref 0–6)
ERYTHROCYTE [DISTWIDTH] IN BLOOD BY AUTOMATED COUNT: 18.4 % (ref 11.6–15.1)
GFR SERPL CREATININE-BSD FRML MDRD: 44 ML/MIN/1.73SQ M
GLUCOSE SERPL-MCNC: 94 MG/DL (ref 65–140)
HCT VFR BLD AUTO: 37.3 % (ref 34.8–46.1)
HGB BLD-MCNC: 10 G/DL (ref 11.5–15.4)
IMM GRANULOCYTES # BLD AUTO: 0.06 THOUSAND/UL (ref 0–0.2)
IMM GRANULOCYTES NFR BLD AUTO: 1 % (ref 0–2)
LYMPHOCYTES # BLD AUTO: 0.84 THOUSANDS/ÂΜL (ref 0.6–4.47)
LYMPHOCYTES NFR BLD AUTO: 7 % (ref 14–44)
MCH RBC QN AUTO: 24.1 PG (ref 26.8–34.3)
MCHC RBC AUTO-ENTMCNC: 26.8 G/DL (ref 31.4–37.4)
MCV RBC AUTO: 90 FL (ref 82–98)
MONOCYTES # BLD AUTO: 0.58 THOUSAND/ÂΜL (ref 0.17–1.22)
MONOCYTES NFR BLD AUTO: 5 % (ref 4–12)
NEUTROPHILS # BLD AUTO: 9.4 THOUSANDS/ÂΜL (ref 1.85–7.62)
NEUTS SEG NFR BLD AUTO: 82 % (ref 43–75)
NRBC BLD AUTO-RTO: 0 /100 WBCS
PLATELET # BLD AUTO: 434 THOUSANDS/UL (ref 149–390)
PMV BLD AUTO: 8.8 FL (ref 8.9–12.7)
POTASSIUM SERPL-SCNC: 4.1 MMOL/L (ref 3.5–5.3)
RBC # BLD AUTO: 4.15 MILLION/UL (ref 3.81–5.12)
SODIUM SERPL-SCNC: 136 MMOL/L (ref 135–147)
VANCOMYCIN SERPL-MCNC: 10.2 UG/ML (ref 10–20)
WBC # BLD AUTO: 11.43 THOUSAND/UL (ref 4.31–10.16)

## 2023-09-10 PROCEDURE — 80048 BASIC METABOLIC PNL TOTAL CA: CPT | Performed by: INTERNAL MEDICINE

## 2023-09-10 PROCEDURE — 99232 SBSQ HOSP IP/OBS MODERATE 35: CPT | Performed by: INTERNAL MEDICINE

## 2023-09-10 PROCEDURE — 85025 COMPLETE CBC W/AUTO DIFF WBC: CPT | Performed by: INTERNAL MEDICINE

## 2023-09-10 PROCEDURE — 80202 ASSAY OF VANCOMYCIN: CPT | Performed by: INTERNAL MEDICINE

## 2023-09-10 RX ADMIN — NYSTATIN 1 APPLICATION: 100000 POWDER TOPICAL at 08:16

## 2023-09-10 RX ADMIN — MIDODRINE HYDROCHLORIDE 2.5 MG: 5 TABLET ORAL at 11:43

## 2023-09-10 RX ADMIN — MIDODRINE HYDROCHLORIDE 2.5 MG: 5 TABLET ORAL at 16:21

## 2023-09-10 RX ADMIN — FUROSEMIDE 40 MG: 40 TABLET ORAL at 08:15

## 2023-09-10 RX ADMIN — ALLOPURINOL 100 MG: 100 TABLET ORAL at 08:15

## 2023-09-10 RX ADMIN — CEFEPIME HYDROCHLORIDE 2000 MG: 2 INJECTION, SOLUTION INTRAVENOUS at 14:06

## 2023-09-10 RX ADMIN — GABAPENTIN 100 MG: 100 CAPSULE ORAL at 08:15

## 2023-09-10 RX ADMIN — GABAPENTIN 100 MG: 100 CAPSULE ORAL at 21:34

## 2023-09-10 RX ADMIN — LEVOTHYROXINE SODIUM 125 MCG: 25 TABLET ORAL at 06:14

## 2023-09-10 RX ADMIN — WARFARIN SODIUM 2 MG: 2 TABLET ORAL at 17:16

## 2023-09-10 RX ADMIN — MIDODRINE HYDROCHLORIDE 2.5 MG: 5 TABLET ORAL at 06:14

## 2023-09-10 RX ADMIN — VANCOMYCIN HYDROCHLORIDE 1500 MG: 1 INJECTION, POWDER, LYOPHILIZED, FOR SOLUTION INTRAVENOUS at 10:08

## 2023-09-10 RX ADMIN — GABAPENTIN 100 MG: 100 CAPSULE ORAL at 16:21

## 2023-09-10 RX ADMIN — DULOXETINE HYDROCHLORIDE 20 MG: 20 CAPSULE, DELAYED RELEASE ORAL at 08:15

## 2023-09-10 RX ADMIN — CEFEPIME HYDROCHLORIDE 2000 MG: 2 INJECTION, SOLUTION INTRAVENOUS at 01:35

## 2023-09-10 RX ADMIN — NYSTATIN 1 APPLICATION: 100000 POWDER TOPICAL at 17:16

## 2023-09-10 NOTE — ASSESSMENT & PLAN NOTE
· Patient euvolemic at this time  · Continue home Lasix with hold parameters  · Monitor volume status  · Continue home midodrine

## 2023-09-10 NOTE — PROGRESS NOTES
Ham Borjas is a 68 y.o. female who is currently ordered Vancomycin IV with management by the Pharmacy Consult service. Relevant clinical data and objective / subjective history reviewed. Vancomycin Assessment:  Indication and Goal AUC/Trough: Soft tissue (goal -600, trough >10), -600, trough >10  Clinical Status: stable  Micro:     Renal Function:  SCr: 1.21 mg/dL  CrCl: 74 mL/min  Renal replacement: Not on dialysis  Days of Therapy: 2  Current Dose: 1500 mg iv q 24 hrs  Vancomycin Plan:  New Dosing: continue 1500 mg iv q 24 hrs  Estimated AUC: 481 mcg*hr/mL  Estimated Trough: 14.4 mcg/mL  Next Level: 9/17/23 @ 0600  Renal Function Monitoring: Daily BMP and [place] will continue to follow closely for s/sx of nephrotoxicity, infusion reactions and appropriateness of therapy. BMP and CBC will be ordered per protocol. We will continue to follow the patient’s culture results and clinical progress daily.     Sherry Raines, Pharmacist

## 2023-09-10 NOTE — PLAN OF CARE
Problem: METABOLIC, FLUID AND ELECTROLYTES - ADULT  Goal: Electrolytes maintained within normal limits  Description: INTERVENTIONS:  - Monitor labs and assess patient for signs and symptoms of electrolyte imbalances  - Administer electrolyte replacement as ordered  - Monitor response to electrolyte replacements, including repeat lab results as appropriate  - Instruct patient on fluid and nutrition as appropriate  Outcome: Steven ornelas for accurate I+O

## 2023-09-10 NOTE — PROGRESS NOTES
[id number] [facility]  Progress Note  Name: Beba Ross  MRN: [id number]  Unit/Bed#: -01 I Date of Admission: 9/9/2023   Date of Service: 9/10/2023 I Hospital Day: 1    Assessment/Plan   * Cellulitis  Assessment & Plan  · Patient presents with bruising and worsening erythema of her right flank and leg  · Procalcitonin slightly elevated  · Afebrile and without significant leukocytosis  · Patient with numerous antibiotic allergies  · Initiated on vancomycin and cefepime ; pharmacy consult for trough management  · Trend fever curve and WBC count  · De-escalate antibiotics as appropriate  · ID consult for management of antibiotics in the setting of numerous antibiotic allergies  · Inpatient consult to wound care nurse    Chronic diastolic congestive heart failure ([address])  Assessment & Plan  · Patient euvolemic at this time  · Continue home Lasix with hold parameters  · Monitor volume status  · Continue home midodrine      Other specified hypothyroidism  Assessment & Plan  · Continue home levothyroxine 125 mcg daily    Mild episode of recurrent major depressive disorder ([address])  Assessment & Plan  · Continue home Cymbalta  · Mood appears stable    Class 3 severe obesity due to excess calories with serious comorbidity and body mass index (BMI) greater than or equal to 70 in [facility])  Assessment & Plan  · Present on admission as evidenced by BMI of 76  · Encouraged lifestyle modifications and weight loss  · Calorie restricted diet    Idiopathic chronic 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  Assessment & Plan  · Continue home allopurinol    Chronic atrial fibrillation ([address])  Assessment & Plan  · Patient appears rate controlled not on AV kiko blockade  · Continue home Coumadin regimen  · INR therapeutic  · Monitor heart rates       VTE Pharmacologic Prophylaxis: Lovenox    Patient Centered Rounds:  Patient care rounds were performed with nursing    Discussions with Specialists or Other Care Physical Therapy  Facility/Department: [facility] SURG ICU  Daily Treatment Note  NAME: Micheal Timmons  : 1938  MRN: [id number]    Date of Service: 2/3/2021    Discharge Recommendations:  Patient would benefit from continued therapy after discharge   PT Equipment Recommendations  Equipment Needed: No    Assessment   Body structures, Functions, Activity limitations: Decreased functional mobility ; Decreased safe awareness;Decreased strength;Decreased balance;Decreased endurance;Decreased coordination  Assessment: Pt with improving mobility- able to ambulate 200ft SBA without device. Pt with supportive family for discharge and will benefit from further therapy at discharge. Prognosis: Excellent  PT Education: Transfer Training;Functional Mobility Training;General Safety;Gait Training  Patient Education: Importance of continued mobility  REQUIRES PT FOLLOW UP: Yes  Activity Tolerance  Activity Tolerance: Patient limited by fatigue;Patient limited by endurance  Activity Tolerance: Completed session on 2.5L nasal cannula     Patient Diagnosis(es): The primary encounter diagnosis was Acute UTI. A diagnosis of Septicemia ([address] was also pertinent to this visit. has a past medical history of Accelerated hypertension, Atypical chest pain, Hyperlipidemia, Hypertension, and Labile blood pressure. has a past surgical history that includes Hysterectomy; Abdomen surgery; LIGATION OF SAPHENOUS VEIN; and bladder suspension. Restrictions  Restrictions/Precautions  Restrictions/Precautions: Fall Risk, Contact Precautions  Required Braces or Orthoses?: No  Position Activity Restriction  Other position/activity restrictions: Ambulate patient. Up with assistance. Subjective   General  Response To Previous Treatment: Patient with no complaints from previous session. Family / Caregiver Present: No  Subjective  Subjective: Pt supine in bed and agreeable to therapy this morning.  Pt reports she needs to use the Team Provider: Nursing    Education and Discussions with Family / Patient: Spoke with patient at bedside    Time Spent for Care: I have spent a total time of 35 minutes on 09/10/23 in caring for this patient including Diagnostic results, Prognosis, Risks and benefits of tx options, Instructions for management, Patient and family education, Importance of tx compliance, Risk factor reductions, Impressions, Counseling / Coordination of care, Documenting in the medical record, Reviewing / ordering tests, medicine, procedures  , Obtaining or reviewing history   and Communicating with other healthcare professionals . Current Length of Stay: 1 day(s)    Current Patient Status: Inpatient     Certification Statement: The patient will continue to require additional inpatient hospital stay due to cellulitis    Discharge Plan: Home with family support once medically stable    Code Status: Level 1 - Full Code      Subjective:   Patient seen and evaluated at bedside. No acute events overnight. Patient remains on vancomycin and cefepime in the setting of severe cellulitis and numerous antibiotic allergies. Objective:     Vitals:   Temp (24hrs), Av.8 °F (36.6 °C), Min:97.6 °F (36.4 °C), Max:98 °F (36.7 °C)    Temp:  [97.6 °F (36.4 °C)-98 °F (36.7 °C)] 97.7 °F (36.5 °C)  HR:  [66-72] 70  Resp:  [15-19] 16  BP: ()/(51-61) 113/52  SpO2:  [87 %-97 %] 95 %  Body mass index is 74.55 kg/m². Input and Output Summary (last 24 hours): Intake/Output Summary (Last 24 hours) at 9/10/2023 1207  Last data filed at 9/10/2023 0900  Gross per 24 hour   Intake 120 ml   Output --   Net 120 ml       Physical Exam:     Physical Exam  Vitals and nursing note reviewed. Constitutional:       General: She is not in acute distress. Appearance: She is well-developed. She is obese. HENT:      Head: Normocephalic and atraumatic.    Eyes:      Conjunctiva/sclera: Conjunctivae normal.   Cardiovascular:      Rate and Rhythm: restroom. No complaints of pain at this time. Pt reports she just overall doesn't feel well. Pain Screening  Patient Currently in Pain: No  Vital Signs  Patient Currently in Pain: No       Objective   Bed mobility  Supine to Sit: Stand by assistance(HOB raised 30 degrees)  Sit to Supine: (Pt retired to chair at end of session)  Scooting: Stand by assistance  Transfers  Sit to Stand: Supervision  Stand to sit: Supervision   Stood from bed height, toilet height and chair height during session. Able to stand x1-2 minutes SBA with dynamic standing balance. Ambulation  Ambulation?: Yes  More Ambulation?: No  Ambulation 1  Surface: level tile  Device: No Device  Assistance: Stand by assistance  Quality of Gait: decreased step length, decreased gait speed, forward flexed, decreased endurance  Gait Deviations: Slow Allyssa;Decreased step length  Distance: 200ft  Stairs/Curb  Stairs?: No     Balance  Posture: Good  Sitting - Static: Good  Sitting - Dynamic: Good  Standing - Static: Good;-  Standing - Dynamic: Good;-  Comments: standing balance assessed without device                           Goals  Short term goals  Time Frame for Short term goals: 14 days  Short term goal 1: Pt independent with all tranfers various surfaces with least restrictive device for safe return to home . Short term goal 2: Pt independent with ambulation and least restrictive device distance greater than or equal to 150 feet x 1. Short term goal 3: Pt independent with  ascending/descending steps for safe navigation within home and to enter home. Patient Goals   Patient goals : to go home    Plan    Plan  Times per week: 5-6x/week.   Times per day: Daily  Current Treatment Recommendations: Functional Mobility Training, Transfer Training, Endurance Training, Gait Training, Stair training, Strengthening, Balance Training, Home Exercise Program, Safety Education & Training, Patient/Caregiver Education & Training  Plan Comment: Pt will receive Normal rate and regular rhythm. Heart sounds: No murmur heard. Pulmonary:      Effort: Pulmonary effort is normal. No respiratory distress. Breath sounds: Normal breath sounds. Abdominal:      Palpations: Abdomen is soft. Tenderness: There is no abdominal tenderness. Musculoskeletal:         General: No swelling. Cervical back: Neck supple. Skin:     General: Skin is warm and dry. Capillary Refill: Capillary refill takes less than 2 seconds. Comments: Erythema of right flank and right leg   Neurological:      Mental Status: She is alert. Psychiatric:         Mood and Affect: Mood normal.         Additional Data:     Labs: I have reviewed pertinent results     Results from last 7 days   Lab Units 09/10/23  0616   WBC Thousand/uL 11.43*   HEMOGLOBIN g/dL 10.0*   HEMATOCRIT % 37.3   PLATELETS Thousands/uL 434*   NEUTROS PCT % 82*   LYMPHS PCT % 7*   MONOS PCT % 5   EOS PCT % 4     Results from last 7 days   Lab Units 09/10/23  0616 09/09/23  1325   SODIUM mmol/L 136 135   POTASSIUM mmol/L 4.1 3.5   CHLORIDE mmol/L 100 98   CO2 mmol/L 29 32   BUN mg/dL 23 24   CREATININE mg/dL 1.21 1.14   ANION GAP mmol/L 7 5   CALCIUM mg/dL 7.9* 7.9*   ALBUMIN g/dL  --  2.5*   TOTAL BILIRUBIN mg/dL  --  0.50   ALK PHOS U/L  --  74   ALT U/L  --  19   AST U/L  --  29   GLUCOSE RANDOM mg/dL 94 108     Results from last 7 days   Lab Units 09/09/23  1325   INR  2.22*             Results from last 7 days   Lab Units 09/09/23  1325   LACTIC ACID mmol/L 1.1   PROCALCITONIN ng/ml 0.31*         Imaging: I have reviewed pertinent imaging       Recent Cultures (last 7 days):     Results from last 7 days   Lab Units 09/09/23  1331 09/09/23  1325   BLOOD CULTURE  Received in Microbiology Lab. Culture in Progress. Received in Microbiology Lab. Culture in Progress.        Last 24 Hours Medication List:   Current Facility-Administered Medications   Medication Dose Route Frequency Provider Last Rate   • functional mobility training and gait.   Safety Devices  Type of devices: Call light within reach, Chair alarm in place, Left in chair, Nurse notified, Gait belt, Patient at risk for falls  Restraints  Initially in place: No     Therapy Time   Individual Concurrent Group Co-treatment   Time In 0825         Time Out 0849         Minutes 24         Timed Code Treatment Minutes: [address], PT acetaminophen  650 mg Oral Q4H PRN Ane Miami, DO     • allopurinol  100 mg Oral Daily Ane Miami, DO     • cefepime  2,000 mg Intravenous Q12H Ane Miami, DO 2,000 mg (09/10/23 0135)   • docusate sodium  100 mg Oral Daily Ane Miami, DO     • DULoxetine  20 mg Oral Daily Ane Miami, DO     • enoxaparin  40 mg Subcutaneous Daily Ane Miami, DO     • furosemide  40 mg Oral Daily Ane Miami, DO     • gabapentin  100 mg Oral TID Ane Miami, DO     • levothyroxine  125 mcg Oral Early Morning Ane Miami, DO     • midodrine  2.5 mg Oral TID AC Ane Miami, DO     • nystatin   Topical BID Ane Miami, DO     • ondansetron  4 mg Intravenous Q6H PRN Ane Miami, DO     • vancomycin  1,500 mg Intravenous Q24H Ane Miami, DO 1,500 mg (09/10/23 1008)   • warfarin  2 mg Oral Once per day on Sun Mon Wed Fri Sat Cinda Lui, DO     • [START ON 9/12/2023] warfarin  4 mg Oral Once per day on Tue Thu Thiago Murdock DO          Today, Patient Was Seen By: Cinda Lui DO    ** Please Note: Dictation voice to text software may have been used in the creation of this document.  **

## 2023-09-10 NOTE — ASSESSMENT & PLAN NOTE
· Patient presents with bruising and worsening erythema of her right flank and leg  · Procalcitonin slightly elevated  · Afebrile and without significant leukocytosis  · Patient with numerous antibiotic allergies  · Initiated on vancomycin and cefepime ; pharmacy consult for trough management  · Trend fever curve and WBC count  · De-escalate antibiotics as appropriate  · ID consult for management of antibiotics in the setting of numerous antibiotic allergies  · Inpatient consult to wound care nurse

## 2023-09-10 NOTE — PLAN OF CARE
Problem: MOBILITY - ADULT  Goal: Maintain or return to baseline ADL function  Description: INTERVENTIONS:  -  Assess patient's ability to carry out ADLs; assess patient's baseline for ADL function and identify physical deficits which impact ability to perform ADLs (bathing, care of mouth/teeth, toileting, grooming, dressing, etc.)  - Assess/evaluate cause of self-care deficits   - Assess range of motion  - Assess patient's mobility; develop plan if impaired  - Assess patient's need for assistive devices and provide as appropriate  - Encourage maximum independence but intervene and supervise when necessary  - Involve family in performance of ADLs  - Assess for home care needs following discharge   - Consider OT consult to assist with ADL evaluation and planning for discharge  - Provide patient education as appropriate  Outcome: Progressing  Goal: Maintains/Returns to pre admission functional level  Description: INTERVENTIONS:  - Perform BMAT or MOVE assessment daily.   - Set and communicate daily mobility goal to care team and patient/family/caregiver. - Collaborate with rehabilitation services on mobility goals if consulted  - Perform Range of Motion 2 times a day. - Reposition patient every 2 hours.   - Dangle patient 2 times a day  - Stand patient 2 times a day  - Ambulate patient 2 times a day  - Out of bed to chair 2 times a day   - Out of bed for meals 2 times a day  - Out of bed for toileting  - Record patient progress and toleration of activity level   Outcome: Progressing     Problem: PAIN - ADULT  Goal: Verbalizes/displays adequate comfort level or baseline comfort level  Description: Interventions:  - Encourage patient to monitor pain and request assistance  - Assess pain using appropriate pain scale  - Administer analgesics based on type and severity of pain and evaluate response  - Implement non-pharmacological measures as appropriate and evaluate response  - Consider cultural and social influences on pain and pain management  - Notify physician/advanced practitioner if interventions unsuccessful or patient reports new pain  Outcome: Progressing     Problem: INFECTION - ADULT  Goal: Absence or prevention of progression during hospitalization  Description: INTERVENTIONS:  - Assess and monitor for signs and symptoms of infection  - Monitor lab/diagnostic results  - Monitor all insertion sites, i.e. indwelling lines, tubes, and drains  - Monitor endotracheal if appropriate and nasal secretions for changes in amount and color  - [place] appropriate cooling/warming therapies per order  - Administer medications as ordered  - Instruct and encourage patient and family to use good hand hygiene technique  - Identify and instruct in appropriate isolation precautions for identified infection/condition  Outcome: Progressing  Goal: Absence of fever/infection during neutropenic period  Description: INTERVENTIONS:  - Monitor WBC    Outcome: Progressing     Problem: SAFETY ADULT  Goal: Maintain or return to baseline ADL function  Description: INTERVENTIONS:  -  Assess patient's ability to carry out ADLs; assess patient's baseline for ADL function and identify physical deficits which impact ability to perform ADLs (bathing, care of mouth/teeth, toileting, grooming, dressing, etc.)  - Assess/evaluate cause of self-care deficits   - Assess range of motion  - Assess patient's mobility; develop plan if impaired  - Assess patient's need for assistive devices and provide as appropriate  - Encourage maximum independence but intervene and supervise when necessary  - Involve family in performance of ADLs  - Assess for home care needs following discharge   - Consider OT consult to assist with ADL evaluation and planning for discharge  - Provide patient education as appropriate  Outcome: Progressing  Goal: Maintains/Returns to pre admission functional level  Description: INTERVENTIONS:  - Perform BMAT or MOVE assessment daily.   - Set and communicate daily mobility goal to care team and patient/family/caregiver. - Collaborate with rehabilitation services on mobility goals if consulted  - Perform Range of Motion 2 times a day. - Reposition patient every 2 hours.   - Dangle patient 2 times a day  - Stand patient 2 times a day  - Ambulate patient 2 times a day  - Out of bed to chair 2 times a day   - Out of bed for meals 2 times a day  - Out of bed for toileting  - Record patient progress and toleration of activity level   Outcome: Progressing  Goal: Patient will remain free of falls  Description: INTERVENTIONS:  - Educate patient/family on patient safety including physical limitations  - Instruct patient to call for assistance with activity   - Consult OT/PT to assist with strengthening/mobility   - Keep Call bell within reach  - Keep bed low and locked with side rails adjusted as appropriate  - Keep care items and personal belongings within reach  - Initiate and maintain comfort rounds  - Make Fall Risk Sign visible to staff  - Offer Toileting every 2 Hours, in advance of need  - Initiate/Maintain 2alarm  - Obtain necessary fall risk management equipment: 2  - Apply yellow socks and bracelet for high fall risk patients  - Consider moving patient to room near nurses station  Outcome: Progressing     Problem: DISCHARGE PLANNING  Goal: Discharge to home or other facility with appropriate resources  Description: INTERVENTIONS:  - Identify barriers to discharge w/patient and caregiver  - Arrange for needed discharge resources and transportation as appropriate  - Identify discharge learning needs (meds, wound care, etc.)  - Arrange for interpretive services to assist at discharge as needed  - Refer to Case Management Department for coordinating discharge planning if the patient needs post-hospital services based on physician/advanced practitioner order or complex needs related to functional status, cognitive ability, or social support system  Outcome: Progressing     Problem: Knowledge Deficit  Goal: Patient/family/caregiver demonstrates understanding of disease process, treatment plan, medications, and discharge instructions  Description: Complete learning assessment and assess knowledge base.   Interventions:  - Provide teaching at level of understanding  - Provide teaching via preferred learning methods  Outcome: Progressing     Problem: METABOLIC, FLUID AND ELECTROLYTES - ADULT  Goal: Electrolytes maintained within normal limits  Description: INTERVENTIONS:  - Monitor labs and assess patient for signs and symptoms of electrolyte imbalances  - Administer electrolyte replacement as ordered  - Monitor response to electrolyte replacements, including repeat lab results as appropriate  - Instruct patient on fluid and nutrition as appropriate  Outcome: Progressing  Goal: Fluid balance maintained  Description: INTERVENTIONS:  - Monitor labs   - Monitor I/O and WT  - Instruct patient on fluid and nutrition as appropriate  - Assess for signs & symptoms of volume excess or deficit  Outcome: Progressing     Problem: SKIN/TISSUE INTEGRITY - ADULT  Goal: Skin Integrity remains intact(Skin Breakdown Prevention)  Description: Assess:  -Perform Gucci assessment every 2  -Clean and moisturize skin every 2  -Inspect skin when repositioning, toileting, and assisting with ADLS  -Assess under medical devices such as 2 every 2  -Assess extremities for adequate circulation and sensation     Bed Management:  -Have minimal linens on bed & keep smooth, unwrinkled  -Change linens as needed when moist or perspiring  -Avoid sitting or lying in one position for more than 2 hours while in bed  -Keep HOB at 2degrees     Toileting:  -Offer bedside commode  -Assess for incontinence every 2  -Use incontinent care products after each incontinent episode such as 2    Activity:  -Mobilize patient 2 times a day  -Encourage activity and walks on unit  -Encourage or provide ROM exercises   -Turn and reposition patient every 2 Hours  -Use appropriate equipment to lift or move patient in bed  -Instruct/ Assist with weight shifting every 2 when out of bed in chair  -Consider limitation of chair time 2 hour intervals    Skin Care:  -Avoid use of baby powder, tape, friction and shearing, hot water or constrictive clothing  -Relieve pressure over bony prominences using 2  -Do not massage red bony areas    Next Steps:  -Teach patient strategies to minimize risks such as 2   -Consider consults to  interdisciplinary teams such as 2  Outcome: Progressing  Goal: Incision(s), wounds(s) or drain site(s) healing without S/S of infection  Description: INTERVENTIONS  - Assess and document dressing, incision, wound bed, drain sites and surrounding tissue  - Provide patient and family education  - Perform skin care/dressing changes every 2  Outcome: Progressing     Problem: Prexisting or High Potential for Compromised Skin Integrity  Goal: Skin integrity is maintained or improved  Description: INTERVENTIONS:  - Identify patients at risk for skin breakdown  - Assess and monitor skin integrity  - Assess and monitor nutrition and hydration status  - Monitor labs   - Assess for incontinence   - Turn and reposition patient  - Assist with mobility/ambulation  - Relieve pressure over bony prominences  - Avoid friction and shearing  - Provide appropriate hygiene as needed including keeping skin clean and dry  - Evaluate need for skin moisturizer/barrier cream  - Collaborate with interdisciplinary team   - Patient/family teaching  - Consider wound care consult   Outcome: Progressing

## 2023-09-11 LAB
ANION GAP SERPL CALCULATED.3IONS-SCNC: 5 MMOL/L
BASOPHILS # BLD AUTO: 0.09 THOUSANDS/ÂΜL (ref 0–0.1)
BASOPHILS NFR BLD AUTO: 1 % (ref 0–1)
BUN SERPL-MCNC: 23 MG/DL (ref 5–25)
CALCIUM SERPL-MCNC: 8.1 MG/DL (ref 8.4–10.2)
CHLORIDE SERPL-SCNC: 100 MMOL/L (ref 96–108)
CO2 SERPL-SCNC: 31 MMOL/L (ref 21–32)
CREAT SERPL-MCNC: 1.32 MG/DL (ref 0.6–1.3)
EOSINOPHIL # BLD AUTO: 0.62 THOUSAND/ÂΜL (ref 0–0.61)
EOSINOPHIL NFR BLD AUTO: 5 % (ref 0–6)
ERYTHROCYTE [DISTWIDTH] IN BLOOD BY AUTOMATED COUNT: 17.9 % (ref 11.6–15.1)
GFR SERPL CREATININE-BSD FRML MDRD: 40 ML/MIN/1.73SQ M
GLUCOSE SERPL-MCNC: 101 MG/DL (ref 65–140)
HCT VFR BLD AUTO: 35.5 % (ref 34.8–46.1)
HGB BLD-MCNC: 9.8 G/DL (ref 11.5–15.4)
IMM GRANULOCYTES # BLD AUTO: 0.21 THOUSAND/UL (ref 0–0.2)
IMM GRANULOCYTES NFR BLD AUTO: 2 % (ref 0–2)
INR PPP: 2.32 (ref 0.84–1.19)
LYMPHOCYTES # BLD AUTO: 0.91 THOUSANDS/ÂΜL (ref 0.6–4.47)
LYMPHOCYTES NFR BLD AUTO: 7 % (ref 14–44)
MAGNESIUM SERPL-MCNC: 2.1 MG/DL (ref 1.9–2.7)
MCH RBC QN AUTO: 23.7 PG (ref 26.8–34.3)
MCHC RBC AUTO-ENTMCNC: 27.6 G/DL (ref 31.4–37.4)
MCV RBC AUTO: 86 FL (ref 82–98)
MONOCYTES # BLD AUTO: 0.69 THOUSAND/ÂΜL (ref 0.17–1.22)
MONOCYTES NFR BLD AUTO: 5 % (ref 4–12)
NEUTROPHILS # BLD AUTO: 10.27 THOUSANDS/ÂΜL (ref 1.85–7.62)
NEUTS SEG NFR BLD AUTO: 80 % (ref 43–75)
NRBC BLD AUTO-RTO: 0 /100 WBCS
PHOSPHATE SERPL-MCNC: 3.4 MG/DL (ref 2.3–4.1)
PLATELET # BLD AUTO: 605 THOUSANDS/UL (ref 149–390)
PMV BLD AUTO: 8.4 FL (ref 8.9–12.7)
POTASSIUM SERPL-SCNC: 4.7 MMOL/L (ref 3.5–5.3)
PROTHROMBIN TIME: 25.4 SECONDS (ref 11.6–14.5)
RBC # BLD AUTO: 4.14 MILLION/UL (ref 3.81–5.12)
SODIUM SERPL-SCNC: 136 MMOL/L (ref 135–147)
WBC # BLD AUTO: 12.79 THOUSAND/UL (ref 4.31–10.16)

## 2023-09-11 PROCEDURE — 84100 ASSAY OF PHOSPHORUS: CPT | Performed by: INTERNAL MEDICINE

## 2023-09-11 PROCEDURE — 85025 COMPLETE CBC W/AUTO DIFF WBC: CPT | Performed by: INTERNAL MEDICINE

## 2023-09-11 PROCEDURE — 83735 ASSAY OF MAGNESIUM: CPT | Performed by: INTERNAL MEDICINE

## 2023-09-11 PROCEDURE — 85610 PROTHROMBIN TIME: CPT | Performed by: INTERNAL MEDICINE

## 2023-09-11 PROCEDURE — 80048 BASIC METABOLIC PNL TOTAL CA: CPT | Performed by: INTERNAL MEDICINE

## 2023-09-11 PROCEDURE — 99232 SBSQ HOSP IP/OBS MODERATE 35: CPT | Performed by: PHYSICIAN ASSISTANT

## 2023-09-11 RX ADMIN — MIDODRINE HYDROCHLORIDE 2.5 MG: 5 TABLET ORAL at 13:00

## 2023-09-11 RX ADMIN — VANCOMYCIN HYDROCHLORIDE 1250 MG: 1 INJECTION, POWDER, LYOPHILIZED, FOR SOLUTION INTRAVENOUS at 10:36

## 2023-09-11 RX ADMIN — DULOXETINE HYDROCHLORIDE 20 MG: 20 CAPSULE, DELAYED RELEASE ORAL at 08:44

## 2023-09-11 RX ADMIN — WARFARIN SODIUM 2 MG: 2 TABLET ORAL at 17:07

## 2023-09-11 RX ADMIN — MIDODRINE HYDROCHLORIDE 2.5 MG: 5 TABLET ORAL at 17:07

## 2023-09-11 RX ADMIN — CEFEPIME HYDROCHLORIDE 2000 MG: 2 INJECTION, SOLUTION INTRAVENOUS at 14:29

## 2023-09-11 RX ADMIN — GABAPENTIN 100 MG: 100 CAPSULE ORAL at 20:19

## 2023-09-11 RX ADMIN — NYSTATIN: 100000 POWDER TOPICAL at 17:08

## 2023-09-11 RX ADMIN — NYSTATIN: 100000 POWDER TOPICAL at 08:46

## 2023-09-11 RX ADMIN — FUROSEMIDE 40 MG: 40 TABLET ORAL at 08:44

## 2023-09-11 RX ADMIN — MIDODRINE HYDROCHLORIDE 2.5 MG: 5 TABLET ORAL at 06:07

## 2023-09-11 RX ADMIN — LEVOTHYROXINE SODIUM 125 MCG: 25 TABLET ORAL at 06:07

## 2023-09-11 RX ADMIN — GABAPENTIN 100 MG: 100 CAPSULE ORAL at 08:44

## 2023-09-11 RX ADMIN — CEFEPIME HYDROCHLORIDE 2000 MG: 2 INJECTION, SOLUTION INTRAVENOUS at 02:13

## 2023-09-11 RX ADMIN — GABAPENTIN 100 MG: 100 CAPSULE ORAL at 17:07

## 2023-09-11 RX ADMIN — ALLOPURINOL 100 MG: 100 TABLET ORAL at 08:44

## 2023-09-11 RX ADMIN — DOCUSATE SODIUM 100 MG: 100 CAPSULE, LIQUID FILLED ORAL at 08:44

## 2023-09-11 NOTE — PLAN OF CARE
Problem: MOBILITY - ADULT  Goal: Maintain or return to baseline ADL function  Description: INTERVENTIONS:  -  Assess patient's ability to carry out ADLs; assess patient's baseline for ADL function and identify physical deficits which impact ability to perform ADLs (bathing, care of mouth/teeth, toileting, grooming, dressing, etc.)  - Assess/evaluate cause of self-care deficits   - Assess range of motion  - Assess patient's mobility; develop plan if impaired  - Assess patient's need for assistive devices and provide as appropriate  - Encourage maximum independence but intervene and supervise when necessary  - Involve family in performance of ADLs  - Assess for home care needs following discharge   - Consider OT consult to assist with ADL evaluation and planning for discharge  - Provide patient education as appropriate  Outcome: Progressing  Goal: Maintains/Returns to pre admission functional level  Description: INTERVENTIONS:  - Perform BMAT or MOVE assessment daily.   - Set and communicate daily mobility goal to care team and patient/family/caregiver. - Collaborate with rehabilitation services on mobility goals if consulted  - Perform Range of Motion  times a day. - Reposition patient every  hours.   - Dangle patient  times a day  - Stand patient  times a day  - Ambulate patient  times a day  - Out of bed to chair  times a day   - Out of bed for meals  times a day  - Out of bed for toileting  - Record patient progress and toleration of activity level   Outcome: Progressing     Problem: PAIN - ADULT  Goal: Verbalizes/displays adequate comfort level or baseline comfort level  Description: Interventions:  - Encourage patient to monitor pain and request assistance  - Assess pain using appropriate pain scale  - Administer analgesics based on type and severity of pain and evaluate response  - Implement non-pharmacological measures as appropriate and evaluate response  - Consider cultural and social influences on pain and pain management  - Notify physician/advanced practitioner if interventions unsuccessful or patient reports new pain  Outcome: Progressing     Problem: INFECTION - ADULT  Goal: Absence or prevention of progression during hospitalization  Description: INTERVENTIONS:  - Assess and monitor for signs and symptoms of infection  - Monitor lab/diagnostic results  - Monitor all insertion sites, i.e. indwelling lines, tubes, and drains  - Monitor endotracheal if appropriate and nasal secretions for changes in amount and color  - [place] appropriate cooling/warming therapies per order  - Administer medications as ordered  - Instruct and encourage patient and family to use good hand hygiene technique  - Identify and instruct in appropriate isolation precautions for identified infection/condition  Outcome: Progressing  Goal: Absence of fever/infection during neutropenic period  Description: INTERVENTIONS:  - Monitor WBC    Outcome: Progressing     Problem: SAFETY ADULT  Goal: Maintain or return to baseline ADL function  Description: INTERVENTIONS:  -  Assess patient's ability to carry out ADLs; assess patient's baseline for ADL function and identify physical deficits which impact ability to perform ADLs (bathing, care of mouth/teeth, toileting, grooming, dressing, etc.)  - Assess/evaluate cause of self-care deficits   - Assess range of motion  - Assess patient's mobility; develop plan if impaired  - Assess patient's need for assistive devices and provide as appropriate  - Encourage maximum independence but intervene and supervise when necessary  - Involve family in performance of ADLs  - Assess for home care needs following discharge   - Consider OT consult to assist with ADL evaluation and planning for discharge  - Provide patient education as appropriate  Outcome: Progressing  Goal: Maintains/Returns to pre admission functional level  Description: INTERVENTIONS:  - Perform BMAT or MOVE assessment daily.   - Set and communicate daily mobility goal to care team and patient/family/caregiver. - Collaborate with rehabilitation services on mobility goals if consulted  - Perform Range of Motion  times a day. - Reposition patient every  hours.   - Dangle patient  times a day  - Stand patient  times a day  - Ambulate patient  times a day  - Out of bed to chair  times a day   - Out of bed for meals  times a day  - Out of bed for toileting  - Record patient progress and toleration of activity level   Outcome: Progressing  Goal: Patient will remain free of falls  Description: INTERVENTIONS:  - Educate patient/family on patient safety including physical limitations  - Instruct patient to call for assistance with activity   - Consult OT/PT to assist with strengthening/mobility   - Keep Call bell within reach  - Keep bed low and locked with side rails adjusted as appropriate  - Keep care items and personal belongings within reach  - Initiate and maintain comfort rounds  - Make Fall Risk Sign visible to staff  - Offer Toileting every  Hours, in advance of need  - Initiate/Maintain alarm  - Obtain necessary fall risk management equipment:   - Apply yellow socks and bracelet for high fall risk patients  - Consider moving patient to room near nurses station  Outcome: Progressing     Problem: DISCHARGE PLANNING  Goal: Discharge to home or other facility with appropriate resources  Description: INTERVENTIONS:  - Identify barriers to discharge w/patient and caregiver  - Arrange for needed discharge resources and transportation as appropriate  - Identify discharge learning needs (meds, wound care, etc.)  - Arrange for interpretive services to assist at discharge as needed  - Refer to Case Management Department for coordinating discharge planning if the patient needs post-hospital services based on physician/advanced practitioner order or complex needs related to functional status, cognitive ability, or social support system  Outcome: Progressing Problem: Knowledge Deficit  Goal: Patient/family/caregiver demonstrates understanding of disease process, treatment plan, medications, and discharge instructions  Description: Complete learning assessment and assess knowledge base.   Interventions:  - Provide teaching at level of understanding  - Provide teaching via preferred learning methods  Outcome: Progressing     Problem: METABOLIC, FLUID AND ELECTROLYTES - ADULT  Goal: Electrolytes maintained within normal limits  Description: INTERVENTIONS:  - Monitor labs and assess patient for signs and symptoms of electrolyte imbalances  - Administer electrolyte replacement as ordered  - Monitor response to electrolyte replacements, including repeat lab results as appropriate  - Instruct patient on fluid and nutrition as appropriate  Outcome: Progressing  Goal: Fluid balance maintained  Description: INTERVENTIONS:  - Monitor labs   - Monitor I/O and WT  - Instruct patient on fluid and nutrition as appropriate  - Assess for signs & symptoms of volume excess or deficit  Outcome: Progressing     Problem: SKIN/TISSUE INTEGRITY - ADULT  Goal: Skin Integrity remains intact(Skin Breakdown Prevention)  Description: Assess:  -Perform [place] assessment every   -Clean and moisturize skin every   -Inspect skin when repositioning, toileting, and assisting with ADLS  -Assess under medical devices such as  every   -Assess extremities for adequate circulation and sensation     Bed Management:  -Have minimal linens on bed & keep smooth, unwrinkled  -Change linens as needed when moist or perspiring  -Avoid sitting or lying in one position for more than  hours while in bed  -Keep HOB at degrees     Toileting:  -Offer bedside commode  -Assess for incontinence every   -Use incontinent care products after each incontinent episode such as     Activity:  -Mobilize patient  times a day  -Encourage activity and walks on unit  -Encourage or provide ROM exercises   -Turn and reposition patient every  Hours  -Use appropriate equipment to lift or move patient in bed  -Instruct/ Assist with weight shifting every  when out of bed in chair  -Consider limitation of chair time  hour intervals    Skin Care:  -Avoid use of baby powder, tape, friction and shearing, hot water or constrictive clothing  -Relieve pressure over bony prominences using   -Do not massage red bony areas    Next Steps:  -Teach patient strategies to minimize risks such as    -Consider consults to  interdisciplinary teams such as   Outcome: Progressing  Goal: Incision(s), wounds(s) or drain site(s) healing without S/S of infection  Description: INTERVENTIONS  - Assess and document dressing, incision, wound bed, drain sites and surrounding tissue  - Provide patient and family education  - Perform skin care/dressing changes every  Outcome: Progressing     Problem: Prexisting or High Potential for Compromised Skin Integrity  Goal: Skin integrity is maintained or improved  Description: INTERVENTIONS:  - Identify patients at risk for skin breakdown  - Assess and monitor skin integrity  - Assess and monitor nutrition and hydration status  - Monitor labs   - Assess for incontinence   - Turn and reposition patient  - Assist with mobility/ambulation  - Relieve pressure over bony prominences  - Avoid friction and shearing  - Provide appropriate hygiene as needed including keeping skin clean and dry  - Evaluate need for skin moisturizer/barrier cream  - Collaborate with interdisciplinary team   - Patient/family teaching  - Consider wound care consult   Outcome: Progressing

## 2023-09-11 NOTE — PROGRESS NOTES
Rosalind Lopez is a 68 y.o. female who is currently ordered Vancomycin IV with management by the Pharmacy Consult service. Relevant clinical data and objective / subjective history reviewed. Vancomycin Assessment:  Indication and Goal AUC/Trough: Soft tissue (goal -600, trough >10), -600, trough >10  Clinical Status: stable  Micro:     Renal Function:  SCr: 1.32 mg/dL  CrCl: 68.1 mL/min  Renal replacement: Not on dialysis  Days of Therapy: 3  Current Dose: 1500 mg q24  Vancomycin Plan:  New Dosin mg q24  Estimated AUC: 447 mcg*hr/mL  Estimated Trough: 13.7 mcg/mL  Next Level:  6am  Renal Function Monitoring: Daily BMP and UOP  Pharmacy will continue to follow closely for s/sx of nephrotoxicity, infusion reactions and appropriateness of therapy. BMP and CBC will be ordered per protocol. We will continue to follow the patient’s culture results and clinical progress daily.     Mg Gonzalez, Pharmacist

## 2023-09-11 NOTE — CASE MANAGEMENT
Case Management Assessment & Discharge Planning Note    Patient name Vincent Paz  Location [address] [facility] 226/-01 MRN [id number]  : 1950 Date 2023       Current Admission Date: 2023  Current Admission Diagnosis:Cellulitis   Patient Active Problem List    Diagnosis Date Noted   • Shingles 2023   • Cellulitis 2023   • Chronic atrial fibrillation ([address]) 2022   • History of Clostridioides difficile infection 2022   • Lymphedema of extremity 2021   • Other specified hypothyroidism 10/03/2020   • Mild episode of recurrent major depressive disorder ([address]) 10/03/2020   • Idiopathic chronic 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 10/03/2020   • Primary osteoarthritis involving multiple joints 10/03/2020   • Class 3 severe obesity due to excess calories with serious comorbidity and body mass index (BMI) greater than or equal to 70 in adult [facility]) 10/03/2020   • Chronic diastolic congestive heart failure ([address]) 10/03/2020   • Panniculitis 10/03/2020   • Gastroesophageal reflux disease without esophagitis 10/03/2020   • Hx MRSA infection 2020   • Impaired mobility 2019   • Osteoarthritis of glenohumeral joint 2019   • Left ovarian cyst 2017   • Depression with anxiety 07/15/2017   • Anemia 2016   • Ambulatory dysfunction 2016   • Adjustment disorder 2013   • Irritable bowel syndrome 2012   • Left atrial enlargement 2012   • Left ventricular hypertrophy 2012   • Carpal tunnel syndrome 2012   • Gout 2012   • Hyperlipidemia 2012   • Symptomatic menopausal or female climacteric states 2012      LOS (days): 2  Geometric Mean LOS (GMLOS) (days): 3.20  Days to GMLOS:1.4     OBJECTIVE:  PATIENT READMITTED TO HOSPITAL  Risk of Unplanned Readmission Score: 31.99         Current admission status: Inpatient       Preferred Pharmacy:   [address] [address] [facility] [address]  Phone: [phone] Fax: [phone]    [facility] [address] [address]  Phone: [phone] Fax: [phone]    Primary Care Provider: Teresa Dalton MD    Primary Insurance: MEDICARE  Secondary Insurance: AARP       ASSESSMENT:  [place] Proxies    There are no active Health Care Proxies on file.          Readmission Root Cause  30 Day Readmission: Yes  Who directed you to return to the hospital?: Self  Did you understand whom to contact if you had questions or problems?: Yes  Did you get your prescriptions before you left the hospital?: No  Reason[de-identified] Declined service  Were you able to get your prescriptions filled when you left the hospital?: Yes  Did you take your medications as prescribed?: Yes  Were you able to get to your follow-up appointments?: Yes  During previous admission, was a post-acute recommendation made?: Yes  What post-acute resources were offered?: [facility]  Patient was readmitted due to: Cellulitis  Action Plan: Home with [facility] and 24/7 caregiver       Patient Information  Admitted from[de-identified] Home  Mental Status: Alert  During Assessment patient was accompanied by: Not accompanied during assessment  Assessment information provided by[de-identified] Patient  Primary Caregiver: Other (Comment)  Caregiver's Name[de-identified] [address] [phone]  Caregiver's Relationship to Patient[de-identified] Other (Specify) (caregiver from [facility])  [address] [place] [address] Mead[place] Number[de-identified] [phone]  [place] of Residence: [facility] do you live in?: [address] [facility] entry access options.  Select all that apply.: Ramp  Type of Current Residence: 2 story home  Upon entering residence, is there a bedroom on the main floor (no further steps)?: Yes (pt stays on the main floor)  Upon entering residence, is there a bathroom on the main floor (no further steps)?: Yes  In the last 12 months, was there a time when you were not able to pay the mortgage or rent on time?: No  In the last 12 months, how many places have you lived?: 1  In the last 12 months, was there a time when you did not have a steady place to sleep or slept in a shelter (including now)?: No  Homeless/housing insecurity resource given?: N/A  Living Ar[facility]ements[facility])  Is patient a ?: No     Activities of Daily Living Prior to Admission  Functional Status: Total dependent  Completes ADLs independently?: No  Level of ADL dependence: Assistance  Ambulates independently?: No  Level of ambulatory dependence: Total Dependent  Does patient use assisted devices?: Yes  Assisted Devices (DME) used: Hospital Bed, Wheelchair  Does patient currently own DME?: Yes  What DME does the patient currently own?: Vanduser Cruger Chair/[place], Bedside Commode, Shower Chair (walk in shower, pulse ox,. bp cuff, bed pan)  Does patient have a history of Outpatient Therapy (PT/OT)?: No  Does the patient have a history of Short-Term Rehab?: Yes ([facility] [address] [place] [address], [facility])  Does patient have a history of HHC?: Yes Reddy Payne)  Does patient currently have [address]?: Yes     Current Home Health Care  Type of Current Home Care Services: Nurse visit  Current Home Health Agency[de-identified] [facility] Provider[de-identified] PCP     Patient Information Continued  Income Source: Pension/alf  Does patient have prescription coverage?: Yes (mail order & Mansi Butcher)  Within the past 12 months, you worried that your food would run out before you got the money to buy more.: Never true  Within the past 12 months, the food you bought just didn't last and you didn't have money to get more.: Never true  Food insecurity resource given?: N/A  Does patient receive dialysis treatments?: No  Does patient have a history of substance abuse?: No  Does patient have a history of Mental Health Diagnosis?: Yes (depression/anxiety)  Is patient receiving treatment for mental health?: Yes (medication from pcp)  Has patient received inpatient treatment related to mental health in the last 2 years?: No        Means of Transportation  Means of Transport to Appts[de-identified] Other (Comment) (medical transport)  In the past 12 months, has lack of transportation kept you from medical appointments or from getting medications?: No  In the past 12 months, has lack of transportation kept you from meetings, work, or from getting things needed for daily living?: No  Was application for public transport provided?: N/A           DISCHARGE DETAILS:     Discharge planning discussed with[de-identified] patient  Freedom of Choice: Yes  Comments - Freedom of Choice: pt is active with [facility]- referrals sent via [employer]          Is the patient interested in [facility] [address] at discharge?: Yes  [facility] requested[de-identified] [facility] Name[de-identified] [address] [place] [address] Provider[de-identified] PCP  [place] Needed[de-identified] Heart Failure Management, Wound/Ostomy Care (resumption of care)  Homebound Criteria Met[de-identified] Requires Medical Transportation  Supporting Clincal Findings[de-identified] Bed Bound or Wheelchair Bound     DME Referral Provided  Referral made for DME?: No     Other Referral/Resources/Interventions Provided:  Interventions: Other (Specify), [address]  Referral Comments: pt will return home with [facility] and waiver caregiver to resume. Pr will need S transport home when stable. CM to follow.      Would you like to participate in our [email or web address] service program?  : No - Declined     Treatment Team Recommendation: Home with [facility] (home with  caregiver & [facility] to resume.  Sent referral for CHARLEY via [employer])

## 2023-09-11 NOTE — ASSESSMENT & PLAN NOTE
· Patient presents with bruising and worsening erythema of her right flank and leg  · Procalcitonin slightly elevated  · Leukocytosis slowly worsening today  · Patient with numerous antibiotic allergies  · Continue vancomycin and cefepime for now  · Trend fever curve and WBC count  · ID consult for management of antibiotics in the setting of numerous antibiotic allergies  · Inpatient consult to wound care nurse

## 2023-09-11 NOTE — PLAN OF CARE
Problem: PAIN - ADULT  Goal: Verbalizes/displays adequate comfort level or baseline comfort level  Description: Interventions:  - Encourage patient to monitor pain and request assistance  - Assess pain using appropriate pain scale  - Administer analgesics based on type and severity of pain and evaluate response  - Implement non-pharmacological measures as appropriate and evaluate response  - Consider cultural and social influences on pain and pain management  - Notify physician/advanced practitioner if interventions unsuccessful or patient reports new pain  Outcome: Progressing     Problem: INFECTION - ADULT  Goal: Absence or prevention of progression during hospitalization  Description: INTERVENTIONS:  - Assess and monitor for signs and symptoms of infection  - Monitor lab/diagnostic results  - Monitor all insertion sites, i.e. indwelling lines, tubes, and drains  - Monitor endotracheal if appropriate and nasal secretions for changes in amount and color  - [place] appropriate cooling/warming therapies per order  - Administer medications as ordered  - Instruct and encourage patient and family to use good hand hygiene technique  - Identify and instruct in appropriate isolation precautions for identified infection/condition  Outcome: Progressing     Problem: Knowledge Deficit  Goal: Patient/family/caregiver demonstrates understanding of disease process, treatment plan, medications, and discharge instructions  Description: Complete learning assessment and assess knowledge base. Interventions:  - Provide teaching at level of understanding  - Provide teaching via preferred learning methods  Outcome: Progressing     Problem: Nutrition/Hydration-ADULT  Goal: Nutrient/Hydration intake appropriate for improving, restoring or maintaining nutritional needs  Description: Monitor and assess patient's nutrition/hydration status for malnutrition.  Collaborate with interdisciplinary team and initiate plan and interventions as The Service to Gastroenterology order in workqueue [id number] requested on 4/19/2021 has been canceled as, patient declined services. Ordering provider has been notified.    Please contact patient, if further communication is needed.   ordered. Monitor patient's weight and dietary intake as ordered or per policy. Utilize nutrition screening tool and intervene as necessary. Determine patient's food preferences and provide high-protein, high-caloric foods as appropriate.      INTERVENTIONS:  - Monitor oral intake, urinary output, labs, and treatment plans  - Assess nutrition and hydration status and recommend course of action  - Evaluate amount of meals eaten  - Assist patient with eating if necessary   - Allow adequate time for meals  - Recommend/ encourage appropriate diets, oral nutritional supplements, and vitamin/mineral supplements  - Order, calculate, and assess calorie counts as needed  - Recommend, monitor, and adjust tube feedings and TPN/PPN based on assessed needs  - Assess need for intravenous fluids  - Provide specific nutrition/hydration education as appropriate  - Include patient/family/caregiver in decisions related to nutrition  Outcome: Progressing

## 2023-09-11 NOTE — PROGRESS NOTES
[address]  Progress Note  Name: Whitney Rangel  MRN: [id number]  Unit/Bed#: -01 I Date of Admission: 9/9/2023   Date of Service: 9/11/2023 I Hospital Day: 2    Assessment/Plan   * Cellulitis  Assessment & Plan  · Patient presents with bruising and worsening erythema of her right flank and leg  · Procalcitonin slightly elevated  · Leukocytosis slowly worsening today  · Patient with numerous antibiotic allergies  · Continue vancomycin and cefepime for now  · Trend fever curve and WBC count  · ID consult for management of antibiotics in the setting of numerous antibiotic allergies  · Inpatient consult to wound care nurse    Chronic atrial fibrillation ([address])  Assessment & Plan  · Patient appears rate controlled not on AV kiko blockade  · Continue home Coumadin regimen  · INR therapeutic  · Monitor heart rates    Chronic diastolic congestive heart failure ([address])  Assessment & Plan  · Patient euvolemic at this time  · Continue home Lasix with hold parameters  · Monitor volume status  · Continue home midodrine      Class 3 severe obesity due to excess calories with serious comorbidity and body mass index (BMI) greater than or equal to 70 in [facility])  Assessment & Plan  · Present on admission as evidenced by BMI of 76  · Encouraged lifestyle modifications and weight loss  · Calorie restricted diet    Idiopathic chronic 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  Assessment & Plan  · Continue home allopurinol    Mild episode of recurrent major depressive disorder (HCC)  Assessment & Plan  · Continue home Cymbalta  · Mood appears stable    Other specified hypothyroidism  Assessment & Plan  · Continue home levothyroxine 125 mcg daily           VTE Pharmacologic Prophylaxis: VTE Score: 5 High Risk (Score >/= 5) - Pharmacological DVT Prophylaxis Ordered: warfarin (Coumadin). Sequential Compression Devices Ordered.     Patient Centered Rounds: I performed bedside rounds with nursing staff today.  Discussions with Specialists or Other Care Team Provider: nursing, CM    Education and Discussions with Family / Patient: patient updated at bedside. Total Time Spent on Date of Encounter in care of patient: 35 minutes This time was spent on one or more of the following: performing physical exam; counseling and coordination of care; obtaining or reviewing history; documenting in the medical record; reviewing/ordering tests, medications or procedures; communicating with other healthcare professionals and discussing with patient's family/caregivers. Current Length of Stay: 2 day(s)  Current Patient Status: Inpatient   Certification Statement: The patient will continue to require additional inpatient hospital stay due to continued need for IV abx, ID consult  Discharge Plan: Anticipate discharge in 48-72 hrs to home with home services. Code Status: Level 1 - Full Code    Subjective: The patient was seen and examined. The patient is lying in bed in no acute distress. She states her redness appears improved from yesterday. Objective:     Vitals:   Temp (24hrs), Av.8 °F (36.6 °C), Min:97.4 °F (36.3 °C), Max:98 °F (36.7 °C)    Temp:  [97.4 °F (36.3 °C)-98 °F (36.7 °C)] 97.4 °F (36.3 °C)  HR:  [67-70] 67  Resp:  [18-20] 18  BP: (101-122)/(48-60) 122/60  SpO2:  [91 %-98 %] 91 %  Body mass index is 74.55 kg/m². Input and Output Summary (last 24 hours): Intake/Output Summary (Last 24 hours) at 2023 1222  Last data filed at 2023 0857  Gross per 24 hour   Intake 290 ml   Output 775 ml   Net -485 ml       Physical Exam:   Physical Exam  Vitals and nursing note reviewed. Constitutional:       General: She is awake. Appearance: She is morbidly obese. HENT:      Head: Normocephalic and atraumatic. Cardiovascular:      Rate and Rhythm: Normal rate and regular rhythm. Heart sounds: Normal heart sounds.    Pulmonary:      Effort: Pulmonary effort is normal.      Breath sounds: Normal breath sounds. Abdominal:      Palpations: Abdomen is soft. Tenderness: There is no abdominal tenderness. Skin:     General: Skin is warm and dry. Findings: Erythema (right flank extending down to right upper thigh) present. Neurological:      General: No focal deficit present. Mental Status: She is alert and oriented to person, place, and time. Psychiatric:         Attention and Perception: Attention normal.         Mood and Affect: Mood normal.         Speech: Speech normal.         Behavior: Behavior is cooperative. Additional Data:     Labs:  Results from last 7 days   Lab Units 09/11/23  0536   WBC Thousand/uL 12.79*   HEMOGLOBIN g/dL 9.8*   HEMATOCRIT % 35.5   PLATELETS Thousands/uL 605*   NEUTROS PCT % 80*   LYMPHS PCT % 7*   MONOS PCT % 5   EOS PCT % 5     Results from last 7 days   Lab Units 09/11/23  0536 09/10/23  0616 09/09/23  1325   SODIUM mmol/L 136   < > 135   POTASSIUM mmol/L 4.7   < > 3.5   CHLORIDE mmol/L 100   < > 98   CO2 mmol/L 31   < > 32   BUN mg/dL 23   < > 24   CREATININE mg/dL 1.32*   < > 1.14   ANION GAP mmol/L 5   < > 5   CALCIUM mg/dL 8.1*   < > 7.9*   ALBUMIN g/dL  --   --  2.5*   TOTAL BILIRUBIN mg/dL  --   --  0.50   ALK PHOS U/L  --   --  74   ALT U/L  --   --  19   AST U/L  --   --  29   GLUCOSE RANDOM mg/dL 101   < > 108    < > = values in this interval not displayed. Results from last 7 days   Lab Units 09/11/23  0536   INR  2.32*             Results from last 7 days   Lab Units 09/09/23  1325   LACTIC ACID mmol/L 1.1   PROCALCITONIN ng/ml 0.31*       Lines/Drains:  Invasive Devices     Peripheral Intravenous Line  Duration           Peripheral IV 09/09/23 Right Antecubital 1 day                      Imaging: No pertinent imaging reviewed. Recent Cultures (last 7 days):   Results from last 7 days   Lab Units 09/09/23  1331 09/09/23  1325   BLOOD CULTURE  No Growth at 24 hrs. No Growth at 24 hrs.        Last 24 Hours Medication List:   Current Facility-Administered Medications   Medication Dose Route Frequency Provider Last Rate   • acetaminophen  650 mg Oral Q4H PRN Lois Reta, DO     • allopurinol  100 mg Oral Daily Lois Reta, DO     • cefepime  2,000 mg Intravenous Q12H Lois Reta, DO Stopped (09/11/23 0243)   • docusate sodium  100 mg Oral Daily Lois Reta, DO     • DULoxetine  20 mg Oral Daily Lois Reta, DO     • furosemide  40 mg Oral Daily Lois Reta, DO     • gabapentin  100 mg Oral TID Lois Reta, DO     • levothyroxine  125 mcg Oral Early Morning Lois Reta, DO     • midodrine  2.5 mg Oral TID AC Lois Mcduffie, DO     • nystatin   Topical BID Lois Reta, DO     • ondansetron  4 mg Intravenous Q6H PRN Lois Reta, DO     • vancomycin  1,250 mg Intravenous Q24H Lois Reta, DO 1,250 mg (09/11/23 1036)   • warfarin  2 mg Oral Once per day on Sun Mon Wed Fri Sat Lois Mcduffie, DO     • [START ON 9/12/2023] warfarin  4 mg Oral Once per day on Tue Thu Thiago Murdock DO          Today, Patient Was Seen By: Jennifer Gilman PA-C    **Please Note: This note may have been constructed using a voice recognition system. **

## 2023-09-12 ENCOUNTER — APPOINTMENT (INPATIENT)
Dept: NON INVASIVE DIAGNOSTICS | Facility: HOSPITAL | Age: 73
DRG: 607 | End: 2023-09-12
Payer: MEDICARE

## 2023-09-12 PROBLEM — M79.605 LEFT LEG PAIN: Status: ACTIVE | Noted: 2023-09-12

## 2023-09-12 LAB
ANION GAP SERPL CALCULATED.3IONS-SCNC: 7 MMOL/L
BUN SERPL-MCNC: 24 MG/DL (ref 5–25)
CALCIUM SERPL-MCNC: 7.9 MG/DL (ref 8.4–10.2)
CHLORIDE SERPL-SCNC: 101 MMOL/L (ref 96–108)
CO2 SERPL-SCNC: 29 MMOL/L (ref 21–32)
CREAT SERPL-MCNC: 1.14 MG/DL (ref 0.6–1.3)
ERYTHROCYTE [DISTWIDTH] IN BLOOD BY AUTOMATED COUNT: 17.9 % (ref 11.6–15.1)
GFR SERPL CREATININE-BSD FRML MDRD: 48 ML/MIN/1.73SQ M
GLUCOSE SERPL-MCNC: 106 MG/DL (ref 65–140)
HCT VFR BLD AUTO: 34.3 % (ref 34.8–46.1)
HGB BLD-MCNC: 9.7 G/DL (ref 11.5–15.4)
INR PPP: 2.47 (ref 0.84–1.19)
MCH RBC QN AUTO: 24.5 PG (ref 26.8–34.3)
MCHC RBC AUTO-ENTMCNC: 28.3 G/DL (ref 31.4–37.4)
MCV RBC AUTO: 87 FL (ref 82–98)
PLATELET # BLD AUTO: 581 THOUSANDS/UL (ref 149–390)
PMV BLD AUTO: 8.3 FL (ref 8.9–12.7)
POTASSIUM SERPL-SCNC: 4.6 MMOL/L (ref 3.5–5.3)
PROTHROMBIN TIME: 26.6 SECONDS (ref 11.6–14.5)
RBC # BLD AUTO: 3.96 MILLION/UL (ref 3.81–5.12)
SODIUM SERPL-SCNC: 137 MMOL/L (ref 135–147)
WBC # BLD AUTO: 11.09 THOUSAND/UL (ref 4.31–10.16)

## 2023-09-12 PROCEDURE — 93971 EXTREMITY STUDY: CPT | Performed by: SURGERY

## 2023-09-12 PROCEDURE — 99223 1ST HOSP IP/OBS HIGH 75: CPT | Performed by: INTERNAL MEDICINE

## 2023-09-12 PROCEDURE — 85610 PROTHROMBIN TIME: CPT | Performed by: PHYSICIAN ASSISTANT

## 2023-09-12 PROCEDURE — 93971 EXTREMITY STUDY: CPT

## 2023-09-12 PROCEDURE — 80048 BASIC METABOLIC PNL TOTAL CA: CPT | Performed by: PHYSICIAN ASSISTANT

## 2023-09-12 PROCEDURE — 99232 SBSQ HOSP IP/OBS MODERATE 35: CPT | Performed by: PHYSICIAN ASSISTANT

## 2023-09-12 PROCEDURE — 85027 COMPLETE CBC AUTOMATED: CPT | Performed by: PHYSICIAN ASSISTANT

## 2023-09-12 RX ADMIN — GABAPENTIN 100 MG: 100 CAPSULE ORAL at 09:25

## 2023-09-12 RX ADMIN — DULOXETINE HYDROCHLORIDE 20 MG: 20 CAPSULE, DELAYED RELEASE ORAL at 09:25

## 2023-09-12 RX ADMIN — MIDODRINE HYDROCHLORIDE 2.5 MG: 5 TABLET ORAL at 06:10

## 2023-09-12 RX ADMIN — VANCOMYCIN HYDROCHLORIDE 1500 MG: 1 INJECTION, POWDER, LYOPHILIZED, FOR SOLUTION INTRAVENOUS at 10:23

## 2023-09-12 RX ADMIN — GABAPENTIN 100 MG: 100 CAPSULE ORAL at 20:39

## 2023-09-12 RX ADMIN — MIDODRINE HYDROCHLORIDE 2.5 MG: 5 TABLET ORAL at 17:13

## 2023-09-12 RX ADMIN — CEFEPIME HYDROCHLORIDE 2000 MG: 2 INJECTION, SOLUTION INTRAVENOUS at 14:53

## 2023-09-12 RX ADMIN — FUROSEMIDE 40 MG: 40 TABLET ORAL at 09:25

## 2023-09-12 RX ADMIN — WARFARIN SODIUM 4 MG: 2 TABLET ORAL at 17:13

## 2023-09-12 RX ADMIN — ALLOPURINOL 100 MG: 100 TABLET ORAL at 09:25

## 2023-09-12 RX ADMIN — DOCUSATE SODIUM 100 MG: 100 CAPSULE, LIQUID FILLED ORAL at 09:25

## 2023-09-12 RX ADMIN — GABAPENTIN 100 MG: 100 CAPSULE ORAL at 17:13

## 2023-09-12 RX ADMIN — LEVOTHYROXINE SODIUM 125 MCG: 25 TABLET ORAL at 06:10

## 2023-09-12 RX ADMIN — MIDODRINE HYDROCHLORIDE 2.5 MG: 5 TABLET ORAL at 12:19

## 2023-09-12 RX ADMIN — NYSTATIN: 100000 POWDER TOPICAL at 09:26

## 2023-09-12 RX ADMIN — CEFEPIME HYDROCHLORIDE 2000 MG: 2 INJECTION, SOLUTION INTRAVENOUS at 02:26

## 2023-09-12 RX ADMIN — NYSTATIN: 100000 POWDER TOPICAL at 17:14

## 2023-09-12 NOTE — CONSULTS
Consultation - [facility]   [facility] 68 y.o. female MRN: [id number]  Unit/Bed#: -01 Encounter: [id number]      IMPRESSION & RECOMMENDATIONS:   1. Chronic lymphedema of the pannus. Patient with recent admissions for recurrent cellulitis and panniculitis despite several courses of antibiotics. Prior wound cultures showed growth of pseudomonas though she improved without coverage for this. She has no leukocytosis and remains afebrile. Exam findings seem most consistent with lymphedema. Consider possibility of a volume issue given her chronic dependent, bed bound status. She also has chronic weeping wounds which I suspect continue to irritate the skin. Patient reports improvement in the past with IV lasix though it is difficult to assess her volume status given morbid obesity.   -discontinue IV antibiotics and monitor off   -continue PO vancomycin ppx for an additional 72h  -serial skin exams and aggressive local wound care   -may need to consider long term placement as patient is unable to manage edema and wound care at home      2. Mild leukocytosis. Stable around 11. She remains afebrile. Blood cultures are negative to date.  -continue to follow up blood cultures   -monitor CBCd, temperature, and hemodynamics      3. Resolving right back, buttock cellulitis and right-sided panniculitis. Completed course of PO Linezolid after recent admission. No new infectious findings appreciated on exam.    4. Morbid obesity.  Patient with BMI of 74.  Unfortunately this limits antibiotic delivery and makes dosing of abx also difficult.     5. Antibiotic allergies. Noted with severe allergies to penicillins and lower generation cephalosporins. Has tolerated cefepime. Unfortunately this limits antibiotic options.     6. History of C. Difficile.  No active diarrhea currently. Desiree Ortiz will continue prophylaxis as above.  Monitor abdominal exam and stool output otherwise.     Antibiotics:  D4 IV cefepime/vancomycin     I have discussed the above management plan in detail with patient. I have discussed the above management plan in detail with patient's RN, and the primary service, [facility]. ID consult service will continue to follow. HISTORY OF PRESENT ILLNESS:  Reason for Consult: cellulitis, panniculitis     HPI: Rolanda Marie is a 68y.o. year old female with CHF, PAF on coumadin, severe obesity, gout, depression, hypothyroidism, recurrent panniculitis, who presented on 9/9 with suspected ongoing cellulitis of pannus, right flank, and right upper leg. She denies fevers or chills. States erythema again noted on Monday 9/4 and wound care aide at home used skin marker to draw on R upper tight. On Friday her pannus was red and draining on both sides prompting eval in ED. She was admitted August 5- august 10 with developing sepsis and painful rash concerning for shingles. She was treated with PO Valtrex and IV vanco and cefepime after wound cx isolated PsA. This was suspected to be skin colonization. She received IV lasix this admission. SHe was switched to PO linezolid to complete 10 d course of abx. She presented back to ED and was admitted August 21- August 28 with right back, buttock, and right sided panniculitis. At the time of this admission she reported continued drainage from the skin likely leading to ongoing maceration of the skin and recurrent cellulitis. Wound cultures again isolated PsA and mixed skin aristeo including mixed gram neg rods. She was started on IV vancomycin and switched to PO linezolid again by ID consult service. She again presented back to the ED 9/9 with complaints of persistent erythema and drainage despite completing abx course. REVIEW OF SYSTEMS:  A complete review of systems is negative other than that noted in the HPI.     PAST MEDICAL HISTORY:  Past Medical History:   Diagnosis Date   • Atrial fibrillation [facility])    • Bladder stone    • C. difficile colitis    • Cellulitis    • CHF (congestive heart failure) ([facility])    • Depression with anxiety    • Disease of thyroid gland    • Diverticulitis    • Enterocolitis    • History of abnormal cervical Pap smear    • Kidney stone    • Lymphedema    • Menopause     Age 53   • Morbid obesity with BMI of 70 and over, adult ([address])    • MRSA (methicillin resistant Staphylococcus aureus)     abdominal wound   • Osteoarthritis    • Phlebitis     left lower leg   • Spondylolysis      Past Surgical History:   Procedure Laterality Date   • APPENDECTOMY     • CARPAL TUNNEL RELEASE     • CHOLECYSTECTOMY     • CYSTOSCOPY      stent   • FOOT SURGERY      bone spur   • KNEE SURGERY     • WISDOM TOOTH EXTRACTION         FAMILY HISTORY:  Non-contributory    SOCIAL HISTORY:  Social History   Social History     Substance and Sexual Activity   Alcohol Use Not Currently     Social History     Substance and Sexual Activity   Drug Use Not Currently   • Types: Marijuana    Comment: As a teen - As per [employer]      Social History     Tobacco Use   Smoking Status Former   • Packs/day: 5.00   • Years: 3.00   • Total pack years: 15.00   • Types: Cigarettes   • Start date: 1967   • Quit date: 1970   • Years since quittin.2   Smokeless Tobacco Never   Tobacco Comments    5 packs/day until 5 (age 17-19) - As per [employer]        ALLERGIES:  Allergies   Allergen Reactions   • Augmentin Es-600 [Amoxicillin-Pot Clavulanate] Anaphylaxis and Rash   • Bactrim [Sulfamethoxazole-Trimethoprim] Anaphylaxis   • Cefazolin Itching and Other (See Comments)     Patient developed itching and difficulty swallowing following IV cefazolin administration at [facility] 20 which required treatment with benadryl and epinephrine - has tolerated other cephalosporins with different side chains including cefepime, cefuroxime, and cephalexin   • Keflex [Cephalexin] Anaphylaxis   • Penicillins Anaphylaxis and Rash     Tolerates cefepime (21)   • Other Rash, Irritability and Edema     Patient reports significant reaction to the use of Max Orb in the abdominal folds leading to swelling, excoriation, maceration and weeping of the skin. • Omeprazole GI Intolerance   • Sulfa Antibiotics Hives   • Latex Rash and Edema   • Vitamin C [Ascorbate - Food Allergy] Rash       MEDICATIONS:  All current active medications have been reviewed. PHYSICAL EXAM:  Temp:  [97.6 °F (36.4 °C)-97.7 °F (36.5 °C)] 97.6 °F (36.4 °C)  HR:  [63-74] 63  Resp:  [18] 18  BP: (107-116)/(53-58) 112/53  SpO2:  [95 %-97 %] 97 %  Temp (24hrs), Av.6 °F (36.4 °C), Min:97.6 °F (36.4 °C), Max:97.7 °F (36.5 °C)  Current: Temperature: 97.6 °F (36.4 °C)    Intake/Output Summary (Last 24 hours) at 2023 0847  Last data filed at 2023 5964  Gross per 24 hour   Intake 290 ml   Output 1850 ml   Net -1560 ml       General Appearance:  Appearing well, nontoxic, and in no distress   Head:  Normocephalic, without obvious abnormality, atraumatic   Eyes:  Conjunctiva pink and sclera anicteric, both eyes   Nose: Nares normal, mucosa normal, no drainage   Throat: Oropharynx moist without lesions   Neck: Supple, symmetrical, no adenopathy, no tenderness/mass/nodules   Back:   Symmetric, no curvature, ROM normal.   Lungs:   Clear to auscultation bilaterally, respirations unlabored   Chest Wall:  No tenderness or deformity   Heart:  RRR; no murmur, rub or gallop   Abdomen:   Soft, non-tender, non-distended, positive bowel sounds    Extremities: No cyanosis, clubbing or edema   Skin: No rashes or lesions. No draining wounds noted. Lymph nodes: Cervical, supraclavicular nodes normal   : No CVA or suprapubic tenderness. Neurologic: Alert and oriented times 3, extremity strength 5/5 and symmetric       LABS, IMAGING, & OTHER STUDIES:  Extensive review of the medical records in epic including review of the notes, radiographs, and laboratory results were reviewed personally as below. Lab Results:   I have personally reviewed pertinent labs. Results from last 7 days   Lab Units 09/12/23  0550 09/11/23  0536 09/10/23  0616   WBC Thousand/uL 11.09* 12.79* 11.43*   HEMOGLOBIN g/dL 9.7* 9.8* 10.0*   PLATELETS Thousands/uL 581* 605* 434*     Results from last 7 days   Lab Units 09/12/23  0550 09/11/23  0536 09/10/23  0616 09/09/23  1325   SODIUM mmol/L 137 136 136 135   POTASSIUM mmol/L 4.6 4.7 4.1 3.5   CHLORIDE mmol/L 101 100 100 98   CO2 mmol/L 29 31 29 32   BUN mg/dL 24 23 23 24   CREATININE mg/dL 1.14 1.32* 1.21 1.14   EGFR ml/min/1.73sq m 48 40 44 48   CALCIUM mg/dL 7.9* 8.1* 7.9* 7.9*   AST U/L  --   --   --  29   ALT U/L  --   --   --  19   ALK PHOS U/L  --   --   --  74     Results from last 7 days   Lab Units 09/09/23  1331 09/09/23  1325   BLOOD CULTURE  No Growth at 48 hrs. No Growth at 48 hrs. Results from last 7 days   Lab Units 09/09/23  1325   PROCALCITONIN ng/ml 0.31*                   Imaging Studies:   I have personally reviewed pertinent imaging study reports and images in PACS. Other Studies:   I have personally reviewed pertinent report including blood cultures, prior wound cx. I have spent a total time of 80 minutes on 09/12/23 in caring for this patient including Risks and benefits of tx options, Importance of tx compliance, Impressions, Counseling / Coordination of care, Documenting in the medical record, Reviewing / ordering tests, medicine, procedures  , Obtaining or reviewing history   and Communicating with other healthcare professionals .

## 2023-09-12 NOTE — ASSESSMENT & PLAN NOTE
· Present on admission as evidenced by BMI of 74.55  · Encouraged lifestyle modifications and weight loss  · Calorie restricted diet

## 2023-09-12 NOTE — ASSESSMENT & PLAN NOTE
· Patient reporting left leg pain/tightness. She states it feels similar to when she had thrombophlebitis and it's concerning to her.   · Patient on coumadin and INR therapeutic at 2.47  · Will check venous duplex LLE

## 2023-09-12 NOTE — ASSESSMENT & PLAN NOTE
· Patient appears rate controlled not on AV kiko blockade  · Continue home coumadin regimen  · INR therapeutic- continue to monitor  · Monitor heart rates

## 2023-09-12 NOTE — PROGRESS NOTES
Leidy Jorgensen is a 68 y.o. female who is currently ordered Vancomycin IV with management by the Pharmacy Consult service. Relevant clinical data and objective / subjective history reviewed. Vancomycin Assessment:  Indication and Goal AUC/Trough: Soft tissue (goal -600, trough >10), -600, trough >10  Clinical Status: stable  Micro:     Renal Function:  SCr: 1.14 mg/dL  CrCl: 78.9 mL/min  Renal replacement: Not on dialysis  Days of Therapy: 4  Current Dose: 1250 mg q24  Vancomycin Plan:  New Dosin mg q24  Estimated AUC: 465 mcg*hr/mL  Estimated Trough: 13.7 mcg/mL  Next Level:  6am  Renal Function Monitoring: Daily BMP and UOP  Pharmacy will continue to follow closely for s/sx of nephrotoxicity, infusion reactions and appropriateness of therapy. BMP and CBC will be ordered per protocol. We will continue to follow the patient’s culture results and clinical progress daily.     Mason Vargas, Pharmacist

## 2023-09-12 NOTE — ASSESSMENT & PLAN NOTE
· Patient presents with bruising and worsening erythema of her right flank and leg  · Procalcitonin slightly elevated  · Leukocytosis slightly improved today  · Patient with numerous antibiotic allergies  · Continue vancomycin and cefepime for now  · Trend fever curve and WBC count  · ID consultation pending  · Inpatient consult to wound care nurse

## 2023-09-12 NOTE — PLAN OF CARE
Problem: MOBILITY - ADULT  Goal: Maintain or return to baseline ADL function  Description: INTERVENTIONS:  -  Assess patient's ability to carry out ADLs; assess patient's baseline for ADL function and identify physical deficits which impact ability to perform ADLs (bathing, care of mouth/teeth, toileting, grooming, dressing, etc.)  - Assess/evaluate cause of self-care deficits   - Assess range of motion  - Assess patient's mobility; develop plan if impaired  - Assess patient's need for assistive devices and provide as appropriate  - Encourage maximum independence but intervene and supervise when necessary  - Involve family in performance of ADLs  - Assess for home care needs following discharge   - Consider OT consult to assist with ADL evaluation and planning for discharge  - Provide patient education as appropriate  Outcome: Progressing  Goal: Maintains/Returns to pre admission functional level  Description: INTERVENTIONS:  - Perform BMAT or MOVE assessment daily.   - Set and communicate daily mobility goal to care team and patient/family/caregiver. - Collaborate with rehabilitation services on mobility goals if consulted  - Perform Range of Motion  times a day. - Reposition patient every  hours.   - Dangle patient  times a day  - Stand patient  times a day  - Ambulate patient  times a day  - Out of bed to chair  times a day   - Out of bed for meals  times a day  - Out of bed for toileting  - Record patient progress and toleration of activity level   Outcome: Progressing     Problem: PAIN - ADULT  Goal: Verbalizes/displays adequate comfort level or baseline comfort level  Description: Interventions:  - Encourage patient to monitor pain and request assistance  - Assess pain using appropriate pain scale  - Administer analgesics based on type and severity of pain and evaluate response  - Implement non-pharmacological measures as appropriate and evaluate response  - Consider cultural and social influences on pain and pain management  - Notify physician/advanced practitioner if interventions unsuccessful or patient reports new pain  Outcome: Progressing     Problem: INFECTION - ADULT  Goal: Absence or prevention of progression during hospitalization  Description: INTERVENTIONS:  - Assess and monitor for signs and symptoms of infection  - Monitor lab/diagnostic results  - Monitor all insertion sites, i.e. indwelling lines, tubes, and drains  - Monitor endotracheal if appropriate and nasal secretions for changes in amount and color  - [place] appropriate cooling/warming therapies per order  - Administer medications as ordered  - Instruct and encourage patient and family to use good hand hygiene technique  - Identify and instruct in appropriate isolation precautions for identified infection/condition  Outcome: Progressing  Goal: Absence of fever/infection during neutropenic period  Description: INTERVENTIONS:  - Monitor WBC    Outcome: Progressing     Problem: SAFETY ADULT  Goal: Maintain or return to baseline ADL function  Description: INTERVENTIONS:  -  Assess patient's ability to carry out ADLs; assess patient's baseline for ADL function and identify physical deficits which impact ability to perform ADLs (bathing, care of mouth/teeth, toileting, grooming, dressing, etc.)  - Assess/evaluate cause of self-care deficits   - Assess range of motion  - Assess patient's mobility; develop plan if impaired  - Assess patient's need for assistive devices and provide as appropriate  - Encourage maximum independence but intervene and supervise when necessary  - Involve family in performance of ADLs  - Assess for home care needs following discharge   - Consider OT consult to assist with ADL evaluation and planning for discharge  - Provide patient education as appropriate  Outcome: Progressing  Goal: Maintains/Returns to pre admission functional level  Description: INTERVENTIONS:  - Perform BMAT or MOVE assessment daily.   - Set and communicate daily mobility goal to care team and patient/family/caregiver. - Collaborate with rehabilitation services on mobility goals if consulted  - Perform Range of Motion  times a day. - Reposition patient every  hours.   - Dangle patient  times a day  - Stand patient  times a day  - Ambulate patient  times a day  - Out of bed to chair  times a day   - Out of bed for meals  times a day  - Out of bed for toileting  - Record patient progress and toleration of activity level   Outcome: Progressing  Goal: Patient will remain free of falls  Description: INTERVENTIONS:  - Educate patient/family on patient safety including physical limitations  - Instruct patient to call for assistance with activity   - Consult OT/PT to assist with strengthening/mobility   - Keep Call bell within reach  - Keep bed low and locked with side rails adjusted as appropriate  - Keep care items and personal belongings within reach  - Initiate and maintain comfort rounds  - Make Fall Risk Sign visible to staff  - Offer Toileting every  Hours, in advance of need  - Initiate/Maintain alarm  - Obtain necessary fall risk management equipment:   - Apply yellow socks and bracelet for high fall risk patients  - Consider moving patient to room near nurses station  Outcome: Progressing     Problem: DISCHARGE PLANNING  Goal: Discharge to home or other facility with appropriate resources  Description: INTERVENTIONS:  - Identify barriers to discharge w/patient and caregiver  - Arrange for needed discharge resources and transportation as appropriate  - Identify discharge learning needs (meds, wound care, etc.)  - Arrange for interpretive services to assist at discharge as needed  - Refer to Case Management Department for coordinating discharge planning if the patient needs post-hospital services based on physician/advanced practitioner order or complex needs related to functional status, cognitive ability, or social support system  Outcome: Progressing Problem: Knowledge Deficit  Goal: Patient/family/caregiver demonstrates understanding of disease process, treatment plan, medications, and discharge instructions  Description: Complete learning assessment and assess knowledge base.   Interventions:  - Provide teaching at level of understanding  - Provide teaching via preferred learning methods  Outcome: Progressing     Problem: METABOLIC, FLUID AND ELECTROLYTES - ADULT  Goal: Electrolytes maintained within normal limits  Description: INTERVENTIONS:  - Monitor labs and assess patient for signs and symptoms of electrolyte imbalances  - Administer electrolyte replacement as ordered  - Monitor response to electrolyte replacements, including repeat lab results as appropriate  - Instruct patient on fluid and nutrition as appropriate  Outcome: Progressing  Goal: Fluid balance maintained  Description: INTERVENTIONS:  - Monitor labs   - Monitor I/O and WT  - Instruct patient on fluid and nutrition as appropriate  - Assess for signs & symptoms of volume excess or deficit  Outcome: Progressing     Problem: SKIN/TISSUE INTEGRITY - ADULT  Goal: Skin Integrity remains intact(Skin Breakdown Prevention)  Description: Assess:  -Perform [place] assessment every   -Clean and moisturize skin every   -Inspect skin when repositioning, toileting, and assisting with ADLS  -Assess under medical devices such as  every   -Assess extremities for adequate circulation and sensation     Bed Management:  -Have minimal linens on bed & keep smooth, unwrinkled  -Change linens as needed when moist or perspiring  -Avoid sitting or lying in one position for more than  hours while in bed  -Keep HOB at degrees     Toileting:  -Offer bedside commode  -Assess for incontinence every   -Use incontinent care products after each incontinent episode such as     Activity:  -Mobilize patient  times a day  -Encourage activity and walks on unit  -Encourage or provide ROM exercises   -Turn and reposition patient every  Hours  -Use appropriate equipment to lift or move patient in bed  -Instruct/ Assist with weight shifting every  when out of bed in chair  -Consider limitation of chair time  hour intervals    Skin Care:  -Avoid use of baby powder, tape, friction and shearing, hot water or constrictive clothing  -Relieve pressure over bony prominences using   -Do not massage red bony areas    Next Steps:  -Teach patient strategies to minimize risks such as    -Consider consults to  interdisciplinary teams such as   Outcome: Progressing  Goal: Incision(s), wounds(s) or drain site(s) healing without S/S of infection  Description: INTERVENTIONS  - Assess and document dressing, incision, wound bed, drain sites and surrounding tissue  - Provide patient and family education  - Perform skin care/dressing changes every  Outcome: Progressing     Problem: Prexisting or High Potential for Compromised Skin Integrity  Goal: Skin integrity is maintained or improved  Description: INTERVENTIONS:  - Identify patients at risk for skin breakdown  - Assess and monitor skin integrity  - Assess and monitor nutrition and hydration status  - Monitor labs   - Assess for incontinence   - Turn and reposition patient  - Assist with mobility/ambulation  - Relieve pressure over bony prominences  - Avoid friction and shearing  - Provide appropriate hygiene as needed including keeping skin clean and dry  - Evaluate need for skin moisturizer/barrier cream  - Collaborate with interdisciplinary team   - Patient/family teaching  - Consider wound care consult   Outcome: Progressing     Problem: Nutrition/Hydration-ADULT  Goal: Nutrient/Hydration intake appropriate for improving, restoring or maintaining nutritional needs  Description: Monitor and assess patient's nutrition/hydration status for malnutrition. Collaborate with interdisciplinary team and initiate plan and interventions as ordered. Monitor patient's weight and dietary intake as ordered or per policy. Utilize nutrition screening tool and intervene as necessary. Determine patient's food preferences and provide high-protein, high-caloric foods as appropriate.      INTERVENTIONS:  - Monitor oral intake, urinary output, labs, and treatment plans  - Assess nutrition and hydration status and recommend course of action  - Evaluate amount of meals eaten  - Assist patient with eating if necessary   - Allow adequate time for meals  - Recommend/ encourage appropriate diets, oral nutritional supplements, and vitamin/mineral supplements  - Order, calculate, and assess calorie counts as needed  - Recommend, monitor, and adjust tube feedings and TPN/PPN based on assessed needs  - Assess need for intravenous fluids  - Provide specific nutrition/hydration education as appropriate  - Include patient/family/caregiver in decisions related to nutrition  Outcome: Progressing

## 2023-09-12 NOTE — PROGRESS NOTES
[address]  Progress Note  Name: Whitney Rangel  MRN: [id number]  Unit/Bed#: -01 I Date of Admission: 9/9/2023   Date of Service: 9/12/2023 I Hospital Day: 3    Assessment/Plan   * Cellulitis  Assessment & Plan  · Patient presents with bruising and worsening erythema of her right flank and leg  · Procalcitonin slightly elevated  · Leukocytosis slightly improved today  · Patient with numerous antibiotic allergies  · Continue vancomycin and cefepime for now  · Trend fever curve and WBC count  · ID consultation pending  · Inpatient consult to wound care nurse    Chronic atrial fibrillation ([address])  Assessment & Plan  · Patient appears rate controlled not on AV kiko blockade  · Continue home coumadin regimen  · INR therapeutic- continue to monitor  · Monitor heart rates    Chronic diastolic congestive heart failure ([address])  Assessment & Plan  · Patient euvolemic at this time  · Continue home Lasix with hold parameters  · Monitor volume status  · Continue home midodrine      Left leg pain  Assessment & Plan  · Patient reporting left leg pain/tightness. She states it feels similar to when she had thrombophlebitis and it's concerning to her.   · Patient on coumadin and INR therapeutic at 2.47  · Will check venous duplex LLE     Class 3 severe obesity due to excess calories with serious comorbidity and body mass index (BMI) greater than or equal to 70 in adult [facility])  Assessment & Plan  · Present on admission as evidenced by BMI of 74.55  · Encouraged lifestyle modifications and weight loss  · Calorie restricted diet    Idiopathic chronic 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  Assessment & Plan  · Continue home allopurinol    Mild episode of recurrent major depressive disorder ([address])  Assessment & Plan  · Continue home Cymbalta  · Mood appears stable    Other specified hypothyroidism  Assessment & Plan  · Continue home levothyroxine 125 mcg daily           VTE Pharmacologic Prophylaxis: VTE Score: 5 High Risk (Score >/= 5) - Pharmacological DVT Prophylaxis Ordered: warfarin (Coumadin). Sequential Compression Devices Ordered. Patient Centered Rounds: I performed bedside rounds with nursing staff today. Discussions with Specialists or Other Care Team Provider: nursing, CM    Education and Discussions with Family / Patient: Patient declined call to . Total Time Spent on Date of Encounter in care of patient: 25 mins. This time was spent on one or more of the following: performing physical exam; counseling and coordination of care; obtaining or reviewing history; documenting in the medical record; reviewing/ordering tests, medications or procedures; communicating with other healthcare professionals and discussing with patient's family/caregivers. Current Length of Stay: 3 day(s)  Current Patient Status: Inpatient   Certification Statement: The patient will continue to require additional inpatient hospital stay due to continued need for IV abx  Discharge Plan: Anticipate discharge in 48-72 hrs to home with home services. Code Status: Level 1 - Full Code    Subjective: The patient was seen and examined. The patient is lying in bed in no acute distress. The patient states she continues to have left leg pain when moving it. She states she's concerned as it feels similar to when she had a blood clot in her leg. She also reports that her home health aide will not be available to take care of her until maybe Monday. Objective:     Vitals:   Temp (24hrs), Av.6 °F (36.4 °C), Min:97.6 °F (36.4 °C), Max:97.7 °F (36.5 °C)    Temp:  [97.6 °F (36.4 °C)-97.7 °F (36.5 °C)] 97.6 °F (36.4 °C)  HR:  [63-74] 68  Resp:  [18] 18  BP: (110-121)/(53-58) 121/54  SpO2:  [91 %-97 %] 92 %  Body mass index is 74.55 kg/m². Input and Output Summary (last 24 hours):      Intake/Output Summary (Last 24 hours) at 2023 1420  Last data filed at 2023 1228  Gross per 24 hour   Intake 770 ml Output 1850 ml   Net -1080 ml       Physical Exam:   Physical Exam  Vitals and nursing note reviewed. Constitutional:       General: She is awake. Appearance: She is morbidly obese. HENT:      Head: Normocephalic and atraumatic. Cardiovascular:      Rate and Rhythm: Normal rate and regular rhythm. Pulmonary:      Effort: Pulmonary effort is normal.      Breath sounds: Normal breath sounds. Abdominal:      Palpations: Abdomen is soft. Tenderness: There is no abdominal tenderness. Musculoskeletal:      Left lower leg: Tenderness (anterior thigh) present. No swelling or deformity. No edema. Skin:     General: Skin is warm and dry. Findings: Erythema (right flank extending down to right leg- improving) present. Neurological:      General: No focal deficit present. Mental Status: She is alert and oriented to person, place, and time. Psychiatric:         Attention and Perception: Attention normal.         Mood and Affect: Mood normal.         Speech: Speech normal.         Behavior: Behavior is cooperative. Additional Data:     Labs:  Results from last 7 days   Lab Units 09/12/23  0550 09/11/23  0536   WBC Thousand/uL 11.09* 12.79*   HEMOGLOBIN g/dL 9.7* 9.8*   HEMATOCRIT % 34.3* 35.5   PLATELETS Thousands/uL 581* 605*   NEUTROS PCT %  --  80*   LYMPHS PCT %  --  7*   MONOS PCT %  --  5   EOS PCT %  --  5     Results from last 7 days   Lab Units 09/12/23  0550 09/10/23  0616 09/09/23  1325   SODIUM mmol/L 137   < > 135   POTASSIUM mmol/L 4.6   < > 3.5   CHLORIDE mmol/L 101   < > 98   CO2 mmol/L 29   < > 32   BUN mg/dL 24   < > 24   CREATININE mg/dL 1.14   < > 1.14   ANION GAP mmol/L 7   < > 5   CALCIUM mg/dL 7.9*   < > 7.9*   ALBUMIN g/dL  --   --  2.5*   TOTAL BILIRUBIN mg/dL  --   --  0.50   ALK PHOS U/L  --   --  74   ALT U/L  --   --  19   AST U/L  --   --  29   GLUCOSE RANDOM mg/dL 106   < > 108    < > = values in this interval not displayed.      Results from last 7 days   Lab Units 09/12/23  0550   INR  2.47*             Results from last 7 days   Lab Units 09/09/23  1325   LACTIC ACID mmol/L 1.1   PROCALCITONIN ng/ml 0.31*       Lines/Drains:  Invasive Devices     Peripheral Intravenous Line  Duration           Peripheral IV 09/09/23 Right Antecubital 3 days                      Imaging: No pertinent imaging reviewed. Recent Cultures (last 7 days):   Results from last 7 days   Lab Units 09/09/23  1331 09/09/23  1325   BLOOD CULTURE  No Growth at 48 hrs. No Growth at 48 hrs. Last 24 Hours Medication List:   Current Facility-Administered Medications   Medication Dose Route Frequency Provider Last Rate   • acetaminophen  650 mg Oral Q4H PRN Oren Innocent, DO     • allopurinol  100 mg Oral Daily Oren Innocent, DO     • cefepime  2,000 mg Intravenous Q12H Oren Innocent, DO Stopped (09/12/23 0256)   • docusate sodium  100 mg Oral Daily Oren Innocent, DO     • DULoxetine  20 mg Oral Daily Oren Innocent, DO     • furosemide  40 mg Oral Daily Oren Innocent, DO     • gabapentin  100 mg Oral TID Oren Innocent, DO     • levothyroxine  125 mcg Oral Early Morning Oren Innocent, DO     • midodrine  2.5 mg Oral TID AC Oren Innocent, DO     • nystatin   Topical BID Oren Innocent, DO     • ondansetron  4 mg Intravenous Q6H PRN Oren Innocent, DO     • vancomycin  1,500 mg Intravenous Q24H Oren Innocent, DO     • warfarin  2 mg Oral Once per day on Sun Mon Wed Fri Sat Oren Innocent, DO     • warfarin  4 mg Oral Once per day on Tue Thu Thiago Murdock DO          Today, Patient Was Seen By: Andrade Villafuerte PA-C    **Please Note: This note may have been constructed using a voice recognition system. **

## 2023-09-12 NOTE — PLAN OF CARE
Problem: PAIN - ADULT  Goal: Verbalizes/displays adequate comfort level or baseline comfort level  Description: Interventions:  - Encourage patient to monitor pain and request assistance  - Assess pain using appropriate pain scale  - Administer analgesics based on type and severity of pain and evaluate response  - Implement non-pharmacological measures as appropriate and evaluate response  - Consider cultural and social influences on pain and pain management  - Notify physician/advanced practitioner if interventions unsuccessful or patient reports new pain  Outcome: Progressing     Problem: INFECTION - ADULT  Goal: Absence or prevention of progression during hospitalization  Description: INTERVENTIONS:  - Assess and monitor for signs and symptoms of infection  - Monitor lab/diagnostic results  - Monitor all insertion sites, i.e. indwelling lines, tubes, and drains  - Monitor endotracheal if appropriate and nasal secretions for changes in amount and color  - [place] appropriate cooling/warming therapies per order  - Administer medications as ordered  - Instruct and encourage patient and family to use good hand hygiene technique  - Identify and instruct in appropriate isolation precautions for identified infection/condition  Outcome: Progressing     Problem: Knowledge Deficit  Goal: Patient/family/caregiver demonstrates understanding of disease process, treatment plan, medications, and discharge instructions  Description: Complete learning assessment and assess knowledge base.   Interventions:  - Provide teaching at level of understanding  - Provide teaching via preferred learning methods  Outcome: Progressing     Problem: METABOLIC, FLUID AND ELECTROLYTES - ADULT  Goal: Electrolytes maintained within normal limits  Description: INTERVENTIONS:  - Monitor labs and assess patient for signs and symptoms of electrolyte imbalances  - Administer electrolyte replacement as ordered  - Monitor response to electrolyte replacements, including repeat lab results as appropriate  - Instruct patient on fluid and nutrition as appropriate  Outcome: Progressing     Problem: Nutrition/Hydration-ADULT  Goal: Nutrient/Hydration intake appropriate for improving, restoring or maintaining nutritional needs  Description: Monitor and assess patient's nutrition/hydration status for malnutrition. Collaborate with interdisciplinary team and initiate plan and interventions as ordered. Monitor patient's weight and dietary intake as ordered or per policy. Utilize nutrition screening tool and intervene as necessary. Determine patient's food preferences and provide high-protein, high-caloric foods as appropriate.      INTERVENTIONS:  - Monitor oral intake, urinary output, labs, and treatment plans  - Assess nutrition and hydration status and recommend course of action  - Evaluate amount of meals eaten  - Assist patient with eating if necessary   - Allow adequate time for meals  - Recommend/ encourage appropriate diets, oral nutritional supplements, and vitamin/mineral supplements  - Order, calculate, and assess calorie counts as needed  - Recommend, monitor, and adjust tube feedings and TPN/PPN based on assessed needs  - Assess need for intravenous fluids  - Provide specific nutrition/hydration education as appropriate  - Include patient/family/caregiver in decisions related to nutrition  Outcome: Progressing

## 2023-09-13 LAB
ANION GAP SERPL CALCULATED.3IONS-SCNC: 6 MMOL/L
BUN SERPL-MCNC: 24 MG/DL (ref 5–25)
CALCIUM SERPL-MCNC: 8 MG/DL (ref 8.4–10.2)
CHLORIDE SERPL-SCNC: 101 MMOL/L (ref 96–108)
CO2 SERPL-SCNC: 31 MMOL/L (ref 21–32)
CREAT SERPL-MCNC: 1.11 MG/DL (ref 0.6–1.3)
ERYTHROCYTE [DISTWIDTH] IN BLOOD BY AUTOMATED COUNT: 18.3 % (ref 11.6–15.1)
GFR SERPL CREATININE-BSD FRML MDRD: 49 ML/MIN/1.73SQ M
GLUCOSE SERPL-MCNC: 96 MG/DL (ref 65–140)
HCT VFR BLD AUTO: 35.3 % (ref 34.8–46.1)
HGB BLD-MCNC: 10 G/DL (ref 11.5–15.4)
INR PPP: 2.58 (ref 0.84–1.19)
MCH RBC QN AUTO: 24.1 PG (ref 26.8–34.3)
MCHC RBC AUTO-ENTMCNC: 28.3 G/DL (ref 31.4–37.4)
MCV RBC AUTO: 85 FL (ref 82–98)
PLATELET # BLD AUTO: 593 THOUSANDS/UL (ref 149–390)
PMV BLD AUTO: 8.3 FL (ref 8.9–12.7)
POTASSIUM SERPL-SCNC: 4.1 MMOL/L (ref 3.5–5.3)
PROTHROMBIN TIME: 27.8 SECONDS (ref 11.6–14.5)
RBC # BLD AUTO: 4.15 MILLION/UL (ref 3.81–5.12)
SODIUM SERPL-SCNC: 138 MMOL/L (ref 135–147)
WBC # BLD AUTO: 9.84 THOUSAND/UL (ref 4.31–10.16)

## 2023-09-13 PROCEDURE — 85027 COMPLETE CBC AUTOMATED: CPT | Performed by: PHYSICIAN ASSISTANT

## 2023-09-13 PROCEDURE — 85610 PROTHROMBIN TIME: CPT | Performed by: PHYSICIAN ASSISTANT

## 2023-09-13 PROCEDURE — 80048 BASIC METABOLIC PNL TOTAL CA: CPT | Performed by: PHYSICIAN ASSISTANT

## 2023-09-13 PROCEDURE — 99232 SBSQ HOSP IP/OBS MODERATE 35: CPT | Performed by: INTERNAL MEDICINE

## 2023-09-13 RX ORDER — CYCLOBENZAPRINE HCL 10 MG
10 TABLET ORAL 3 TIMES DAILY PRN
Status: DISCONTINUED | OUTPATIENT
Start: 2023-09-13 | End: 2023-09-13

## 2023-09-13 RX ORDER — FUROSEMIDE 10 MG/ML
40 INJECTION INTRAMUSCULAR; INTRAVENOUS ONCE
Status: COMPLETED | OUTPATIENT
Start: 2023-09-13 | End: 2023-09-13

## 2023-09-13 RX ADMIN — LEVOTHYROXINE SODIUM 125 MCG: 25 TABLET ORAL at 06:13

## 2023-09-13 RX ADMIN — NYSTATIN 1 APPLICATION: 100000 POWDER TOPICAL at 18:21

## 2023-09-13 RX ADMIN — ALLOPURINOL 100 MG: 100 TABLET ORAL at 09:05

## 2023-09-13 RX ADMIN — GABAPENTIN 100 MG: 100 CAPSULE ORAL at 09:05

## 2023-09-13 RX ADMIN — DOCUSATE SODIUM 100 MG: 100 CAPSULE, LIQUID FILLED ORAL at 09:05

## 2023-09-13 RX ADMIN — NYSTATIN 1 APPLICATION: 100000 POWDER TOPICAL at 09:04

## 2023-09-13 RX ADMIN — MIDODRINE HYDROCHLORIDE 2.5 MG: 5 TABLET ORAL at 06:13

## 2023-09-13 RX ADMIN — FUROSEMIDE 40 MG: 40 TABLET ORAL at 09:07

## 2023-09-13 RX ADMIN — WARFARIN SODIUM 2 MG: 2 TABLET ORAL at 18:20

## 2023-09-13 RX ADMIN — GABAPENTIN 100 MG: 100 CAPSULE ORAL at 22:09

## 2023-09-13 RX ADMIN — MIDODRINE HYDROCHLORIDE 2.5 MG: 5 TABLET ORAL at 11:19

## 2023-09-13 RX ADMIN — MIDODRINE HYDROCHLORIDE 2.5 MG: 5 TABLET ORAL at 16:50

## 2023-09-13 RX ADMIN — DULOXETINE HYDROCHLORIDE 20 MG: 20 CAPSULE, DELAYED RELEASE ORAL at 09:04

## 2023-09-13 RX ADMIN — FUROSEMIDE 40 MG: 10 INJECTION, SOLUTION INTRAMUSCULAR; INTRAVENOUS at 14:00

## 2023-09-13 RX ADMIN — GABAPENTIN 100 MG: 100 CAPSULE ORAL at 16:50
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
## 2023-09-13 NOTE — WOUND OSTOMY CARE
Consult Note - Wound   Katie Reyes 68 y.o. female MRN: [id number]  Unit/Bed#: -01 Encounter: [id number]      History and Present Illness:  Patient is 69 yo female admitted to [facility] with cellulitis. Patient has history of She has a history of morbid obesity with a BMI of 74.55, chronic lymphedema. recurrent cellulitis, C-diff colitis, AFIB, CHF and hypothyroidism, bed bound, on recent admission patient had shingles. Patient recently seen on 08/23/2023 on prior admission for skin breakdown. Bariatric bed has been ordered for patient. Patient seen on prior 2 admissions for skin breakdown, refuses Maxorb/ Ag, Maxorb rope, Interdry. Patient has her preference for skin fold breakdown. She has Nystatin powder ordered this admission and has been allowing that applied. Assessment Findings:   She is awake, alert and oriented. She is in bed for assessment. Patient is incontinent of bowel and bladder. Patient is max assist with turning from side to side for assessment. Patient is independent with meals. She has a purewick in use for urinary management, is not incontinent of stool. She is pleasant and cooperative. She is a moderate assist with hygiene care    1. Bilateral heels intact  2. Skin folds to the bilateral knees, upper thighs, right and left abdominal fold, bilateral breast folds, right upper back skin folds and right and left lateral thigh-hip areas of lymphedema. Erythema to the folds, scattered areas of partial thickness erosion, none with active draining to the folds, right lateral hip-thigh area of lymphedema draining small amount of serous drainage. Patient states she has been using Nystatin powder with ABD's to the folds, this was done at time of assessment. 3. Buttocks and sacrum intact     Skin and Wound Care Plan:   1. Cleanse folds to the abdomen, breast, upper thigh and knee folds with Remedy foaming cleanser, pat dry.  Dust Nystatin powder in folds, place ABD in fold to control moisture, change daily and PRN  2. Apply skin nourishing cream to the skin daily  3. Hydraguard to bilateral sacrum, buttock and heels BID and PRN     Patient prefers to not have her heels offloaded or green cushions for offloading. Bariatric bed order placed    Wounds:  Wound 03/22/23 Back Lateral;Left;Lower (Active)   Wound Description Beefy red;Fragile 09/12/23 1500   Alison-wound Assessment Edema; Erythema 09/12/23 1500   Wound Site Closure Open to air; Other (Comment) 09/12/23 1500   Drainage Amount Moderate 09/12/23 1500   Drainage Description Serous 09/12/23 1500   Treatments Cleansed;Site care 09/12/23 1500   Dressing ABD 09/12/23 1500   Dressing Changed New 09/12/23 1500   Patient Tolerance Tolerated well 09/12/23 1500   Dressing Status Clean;Dry; Intact 09/12/23 1500       Wound 03/22/23 Thigh Left;Lateral (Active)       Wound 03/22/23 MASD Breast Left;Upper (Active)   Wound Image   09/11/23 1549   Wound Description Beefy red;Fragile 09/12/23 1500   Alison-wound Assessment Pink;Fragile 09/12/23 1500   Wound Length (cm) 1.5 cm 09/11/23 1549   Wound Width (cm) 7 cm 09/11/23 1549   Wound Depth (cm) 0.1 cm 09/11/23 1549   Wound Surface Area (cm^2) 10.5 cm^2 09/11/23 1549   Wound Volume (cm^3) 1.05 cm^3 09/11/23 1549   Calculated Wound Volume (cm^3) 1.05 cm^3 09/11/23 1549   Change in Wound Size % -320 09/11/23 1549   Wound Site Closure Open to air 09/12/23 1500   Drainage Amount Small 09/12/23 1500   Drainage Description Serosanguineous 09/12/23 1500   Treatments Cleansed;Site care 09/12/23 1500   Dressing ABD 09/12/23 1500   Wound packed? No 09/09/23 1507   Dressing Changed Changed 09/12/23 1500   Patient Tolerance Tolerated well 09/12/23 1500   Dressing Status Clean;Dry; Intact 09/12/23 1500       Wound 03/22/23 Abdomen Right;Lateral (Active)   Wound Description Beefy red;Edema;Bleeding;Non-blanchable erythema 09/12/23 1500   Alison-wound Assessment Pink;Rash 09/12/23 1500   Wound Site Closure Open to air 09/12/23 1500   Drainage Amount Small 09/12/23 1500   Drainage Description Serosanguineous 09/12/23 1500   Non-staged Wound Description Partial thickness 09/11/23 1549   Treatments Cleansed 09/12/23 1500   Dressing ABD 09/12/23 1500   Wound packed? No 09/09/23 1507   Dressing Changed New 09/12/23 1500   Patient Tolerance Tolerated well 09/12/23 1500   Dressing Status Clean;Dry; Intact 09/12/23 1500       Wound 08/05/23 MASD Hip Right;Posterior (Active)       Wound 08/05/23 MASD Pelvis Anterior; Left (Active)       Wound 08/05/23 MASD Abdomen Medial;Lower (Active)   Wound Image   09/11/23 1559   Wound Description Beefy red;Fragile;Pink 09/12/23 2100   Alison-wound Assessment Intact 09/12/23 1500   Wound Length (cm) 0.3 cm 09/11/23 1559   Wound Width (cm) 9 cm 09/11/23 1559   Wound Depth (cm) 0.1 cm 09/11/23 1559   Wound Surface Area (cm^2) 2.7 cm^2 09/11/23 1559   Wound Volume (cm^3) 0.27 cm^3 09/11/23 1559   Calculated Wound Volume (cm^3) 0.27 cm^3 09/11/23 1559   Change in Wound Size % 74.29 09/11/23 1559   Drainage Amount None 09/11/23 1559   Non-staged Wound Description Partial thickness 09/11/23 1559   Treatments Cleansed 09/12/23 1500   Dressing ABD 09/12/23 2100   Patient Tolerance Tolerated well 09/12/23 1500       Wound 08/05/23 MASD Lymphedema Hip Right (Active)   Wound Image   09/11/23 1557   Wound Description Beefy red;Brown 09/12/23 2100   Alison-wound Assessment Edema; Erythema;Fragile 09/12/23 2100   Wound Length (cm) 1 cm 09/11/23 1557   Wound Width (cm) 6 cm 09/11/23 1557   Wound Depth (cm) 0.1 cm 09/11/23 1557   Wound Surface Area (cm^2) 6 cm^2 09/11/23 1557   Wound Volume (cm^3) 0.6 cm^3 09/11/23 1557   Calculated Wound Volume (cm^3) 0.6 cm^3 09/11/23 1557   Drainage Amount Moderate 09/12/23 1500   Drainage Description Serous 09/12/23 1500   Non-staged Wound Description Partial thickness 09/11/23 1557   Treatments Cleansed 09/12/23 1500   Dressing ABD 09/12/23 2100   Patient Tolerance Tolerated well 09/12/23 1500 Wound 08/05/23 Other (comment) Other (Comment) Back Right (Active)   Wound Description Beefy red;Fragile; Non-blanchable erythema 09/12/23 1500   Alison-wound Assessment Clean;Dry; Intact 09/12/23 1500   Wound Site Closure Open to air 09/12/23 0900   Drainage Amount Small 09/12/23 1500   Drainage Description Serous 09/12/23 1500   Treatments Cleansed 09/12/23 1500   Dressing ABD 09/12/23 1500   Wound packed? No 09/09/23 1507   Dressing Changed Changed 09/12/23 1500   Patient Tolerance Tolerated well 09/12/23 1500   Dressing Status Clean;Dry; Intact 09/12/23 1500       Wound 08/07/23 Breast Right; Lower (Active)   Wound Image   09/11/23 1555   Wound Description Beefy red;Pink 09/12/23 1500   Alison-wound Assessment Dry;Clean; Intact 09/12/23 1500   Wound Length (cm) 0.3 cm 09/11/23 1555   Wound Width (cm) 11 cm 09/11/23 1555   Wound Depth (cm) 0.1 cm 09/11/23 1555   Wound Surface Area (cm^2) 3.3 cm^2 09/11/23 1555   Wound Volume (cm^3) 0.33 cm^3 09/11/23 1555   Calculated Wound Volume (cm^3) 0.33 cm^3 09/11/23 1555   Change in Wound Size % 51.47 09/11/23 1555   Drainage Amount None 09/12/23 1500   Non-staged Wound Description Partial thickness 09/11/23 1555   Treatments Cleansed 09/12/23 1500   Dressing ABD 09/12/23 1500   Dressing Changed New 09/11/23 0830   Patient Tolerance Tolerated well 09/12/23 1500   Dressing Status Clean;Dry; Intact 09/12/23 1500       Wound 08/21/23 MASD Abdomen Medial;Right (Active)   Wound Image   09/11/23 1600   Wound Description Beefy red;Fragile;Pink 09/12/23 2100   Alison-wound Assessment Scaly 09/12/23 2100   Wound Length (cm) 3 cm 09/11/23 1600   Wound Width (cm) 9 cm 09/11/23 1600   Wound Depth (cm) 0.1 cm 09/11/23 1600   Wound Surface Area (cm^2) 27 cm^2 09/11/23 1600   Wound Volume (cm^3) 2.7 cm^3 09/11/23 1600   Calculated Wound Volume (cm^3) 2.7 cm^3 09/11/23 1600   Drainage Amount None 09/11/23 1600   Non-staged Wound Description Partial thickness 09/11/23 1600   Treatments Cleansed 09/12/23 1500   Dressing ABD 09/12/23 2100   Patient Tolerance Tolerated well 09/12/23 1500     Reviewed plan of care with primary RN CAMIL[facility]  Recommendations written as orders  Wound care team to follow weekly while admitted  Questions or concerns [id number] [facility] BSN, RN, [facility]

## 2023-09-13 NOTE — CONSULTS
The patient’s Vancomycin therapy has been completed / discontinued. Thank you for this consult; Pharmacy will sign-off now.     Ricco MackD

## 2023-09-13 NOTE — PLAN OF CARE
Problem: DISCHARGE PLANNING  Goal: Discharge to home or other facility with appropriate resources  Description: INTERVENTIONS:  - Identify barriers to discharge w/patient and caregiver  - Arrange for needed discharge resources and transportation as appropriate  - Identify discharge learning needs (meds, wound care, etc.)  - Arrange for interpretive services to assist at discharge as needed  - Refer to Case Management Department for coordinating discharge planning if the patient needs post-hospital services based on physician/advanced practitioner order or complex needs related to functional status, cognitive ability, or social support system  Outcome: Progressing     Problem: Knowledge Deficit  Goal: Patient/family/caregiver demonstrates understanding of disease process, treatment plan, medications, and discharge instructions  Description: Complete learning assessment and assess knowledge base.   Interventions:  - Provide teaching at level of understanding  - Provide teaching via preferred learning methods  Outcome: Progressing     Problem: METABOLIC, FLUID AND ELECTROLYTES - ADULT  Goal: Electrolytes maintained within normal limits  Description: INTERVENTIONS:  - Monitor labs and assess patient for signs and symptoms of electrolyte imbalances  - Administer electrolyte replacement as ordered  - Monitor response to electrolyte replacements, including repeat lab results as appropriate  - Instruct patient on fluid and nutrition as appropriate  Outcome: Progressing  Goal: Fluid balance maintained  Description: INTERVENTIONS:  - Monitor labs   - Monitor I/O and WT  - Instruct patient on fluid and nutrition as appropriate  - Assess for signs & symptoms of volume excess or deficit  Outcome: Progressing
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
## 2023-09-13 NOTE — ASSESSMENT & PLAN NOTE
· Patient reporting left leg pain/tightness. · Venous Duplex (9/12): No evidence of acute or chronic deep vein thrombosis. No evidence of superficial thrombophlebitis noted.   · Again, will treat with IV diuresis as above for chronic lymphedema

## 2023-09-13 NOTE — PLAN OF CARE
Problem: MOBILITY - ADULT  Goal: Maintain or return to baseline ADL function  Description: INTERVENTIONS:  -  Assess patient's ability to carry out ADLs; assess patient's baseline for ADL function and identify physical deficits which impact ability to perform ADLs (bathing, care of mouth/teeth, toileting, grooming, dressing, etc.)  - Assess/evaluate cause of self-care deficits   - Assess range of motion  - Assess patient's mobility; develop plan if impaired  - Assess patient's need for assistive devices and provide as appropriate  - Encourage maximum independence but intervene and supervise when necessary  - Involve family in performance of ADLs  - Assess for home care needs following discharge   - Consider OT consult to assist with ADL evaluation and planning for discharge  - Provide patient education as appropriate  Outcome: Progressing  Goal: Maintains/Returns to pre admission functional level  Description: INTERVENTIONS:  - Perform BMAT or MOVE assessment daily.   - Set and communicate daily mobility goal to care team and patient/family/caregiver. - Collaborate with rehabilitation services on mobility goals if consulted  - Perform Range of Motion  times a day. - Reposition patient every  hours.   - Dangle patient  times a day  - Stand patient  times a day  - Ambulate patient  times a day  - Out of bed to chair  times a day   - Out of bed for meals  times a day  - Out of bed for toileting  - Record patient progress and toleration of activity level   Outcome: Progressing     Problem: PAIN - ADULT  Goal: Verbalizes/displays adequate comfort level or baseline comfort level  Description: Interventions:  - Encourage patient to monitor pain and request assistance  - Assess pain using appropriate pain scale  - Administer analgesics based on type and severity of pain and evaluate response  - Implement non-pharmacological measures as appropriate and evaluate response  - Consider cultural and social influences on pain and pain management  - Notify physician/advanced practitioner if interventions unsuccessful or patient reports new pain  Outcome: Progressing     Problem: INFECTION - ADULT  Goal: Absence or prevention of progression during hospitalization  Description: INTERVENTIONS:  - Assess and monitor for signs and symptoms of infection  - Monitor lab/diagnostic results  - Monitor all insertion sites, i.e. indwelling lines, tubes, and drains  - Monitor endotracheal if appropriate and nasal secretions for changes in amount and color  - Dema appropriate cooling/warming therapies per order  - Administer medications as ordered  - Instruct and encourage patient and family to use good hand hygiene technique  - Identify and instruct in appropriate isolation precautions for identified infection/condition  Outcome: Progressing  Goal: Absence of fever/infection during neutropenic period  Description: INTERVENTIONS:  - Monitor WBC    Outcome: Progressing     Problem: SAFETY ADULT  Goal: Maintain or return to baseline ADL function  Description: INTERVENTIONS:  -  Assess patient's ability to carry out ADLs; assess patient's baseline for ADL function and identify physical deficits which impact ability to perform ADLs (bathing, care of mouth/teeth, toileting, grooming, dressing, etc.)  - Assess/evaluate cause of self-care deficits   - Assess range of motion  - Assess patient's mobility; develop plan if impaired  - Assess patient's need for assistive devices and provide as appropriate  - Encourage maximum independence but intervene and supervise when necessary  - Involve family in performance of ADLs  - Assess for home care needs following discharge   - Consider OT consult to assist with ADL evaluation and planning for discharge  - Provide patient education as appropriate  Outcome: Progressing  Goal: Maintains/Returns to pre admission functional level  Description: INTERVENTIONS:  - Perform BMAT or MOVE assessment daily.   - Set and communicate daily mobility goal to care team and patient/family/caregiver. - Collaborate with rehabilitation services on mobility goals if consulted  - Perform Range of Motion  times a day. - Reposition patient every  hours.   - Dangle patient  times a day  - Stand patient  times a day  - Ambulate patient  times a day  - Out of bed to chair  times a day   - Out of bed for meals  times a day  - Out of bed for toileting  - Record patient progress and toleration of activity level   Outcome: Progressing  Goal: Patient will remain free of falls  Description: INTERVENTIONS:  - Educate patient/family on patient safety including physical limitations  - Instruct patient to call for assistance with activity   - Consult OT/PT to assist with strengthening/mobility   - Keep Call bell within reach  - Keep bed low and locked with side rails adjusted as appropriate  - Keep care items and personal belongings within reach  - Initiate and maintain comfort rounds  - Make Fall Risk Sign visible to staff  - Offer Toileting every  Hours, in advance of need  - Initiate/Maintain alarm  - Obtain necessary fall risk management equipment:   - Apply yellow socks and bracelet for high fall risk patients  - Consider moving patient to room near nurses station  Outcome: Progressing     Problem: DISCHARGE PLANNING  Goal: Discharge to home or other facility with appropriate resources  Description: INTERVENTIONS:  - Identify barriers to discharge w/patient and caregiver  - Arrange for needed discharge resources and transportation as appropriate  - Identify discharge learning needs (meds, wound care, etc.)  - Arrange for interpretive services to assist at discharge as needed  - Refer to Case Management Department for coordinating discharge planning if the patient needs post-hospital services based on physician/advanced practitioner order or complex needs related to functional status, cognitive ability, or social support system  Outcome: Progressing Problem: Knowledge Deficit  Goal: Patient/family/caregiver demonstrates understanding of disease process, treatment plan, medications, and discharge instructions  Description: Complete learning assessment and assess knowledge base.   Interventions:  - Provide teaching at level of understanding  - Provide teaching via preferred learning methods  Outcome: Progressing     Problem: METABOLIC, FLUID AND ELECTROLYTES - ADULT  Goal: Electrolytes maintained within normal limits  Description: INTERVENTIONS:  - Monitor labs and assess patient for signs and symptoms of electrolyte imbalances  - Administer electrolyte replacement as ordered  - Monitor response to electrolyte replacements, including repeat lab results as appropriate  - Instruct patient on fluid and nutrition as appropriate  Outcome: Progressing  Goal: Fluid balance maintained  Description: INTERVENTIONS:  - Monitor labs   - Monitor I/O and WT  - Instruct patient on fluid and nutrition as appropriate  - Assess for signs & symptoms of volume excess or deficit  Outcome: Progressing     Problem: SKIN/TISSUE INTEGRITY - ADULT  Goal: Skin Integrity remains intact(Skin Breakdown Prevention)  Description: Assess:  -Perform Gucci assessment every   -Clean and moisturize skin every   -Inspect skin when repositioning, toileting, and assisting with ADLS  -Assess under medical devices such as  every   -Assess extremities for adequate circulation and sensation     Bed Management:  -Have minimal linens on bed & keep smooth, unwrinkled  -Change linens as needed when moist or perspiring  -Avoid sitting or lying in one position for more than  hours while in bed  -Keep HOB at degrees     Toileting:  -Offer bedside commode  -Assess for incontinence every   -Use incontinent care products after each incontinent episode such as     Activity:  -Mobilize patient  times a day  -Encourage activity and walks on unit  -Encourage or provide ROM exercises   -Turn and reposition patient every  Hours  -Use appropriate equipment to lift or move patient in bed  -Instruct/ Assist with weight shifting every  when out of bed in chair  -Consider limitation of chair time  hour intervals    Skin Care:  -Avoid use of baby powder, tape, friction and shearing, hot water or constrictive clothing  -Relieve pressure over bony prominences using   -Do not massage red bony areas    Next Steps:  -Teach patient strategies to minimize risks such as    -Consider consults to  interdisciplinary teams such as   Outcome: Progressing  Goal: Incision(s), wounds(s) or drain site(s) healing without S/S of infection  Description: INTERVENTIONS  - Assess and document dressing, incision, wound bed, drain sites and surrounding tissue  - Provide patient and family education  - Perform skin care/dressing changes every   Outcome: Progressing     Problem: Prexisting or High Potential for Compromised Skin Integrity  Goal: Skin integrity is maintained or improved  Description: INTERVENTIONS:  - Identify patients at risk for skin breakdown  - Assess and monitor skin integrity  - Assess and monitor nutrition and hydration status  - Monitor labs   - Assess for incontinence   - Turn and reposition patient  - Assist with mobility/ambulation  - Relieve pressure over bony prominences  - Avoid friction and shearing  - Provide appropriate hygiene as needed including keeping skin clean and dry  - Evaluate need for skin moisturizer/barrier cream  - Collaborate with interdisciplinary team   - Patient/family teaching  - Consider wound care consult   Outcome: Progressing     Problem: Nutrition/Hydration-ADULT  Goal: Nutrient/Hydration intake appropriate for improving, restoring or maintaining nutritional needs  Description: Monitor and assess patient's nutrition/hydration status for malnutrition. Collaborate with interdisciplinary team and initiate plan and interventions as ordered.   Monitor patient's weight and dietary intake as ordered or per policy. Utilize nutrition screening tool and intervene as necessary. Determine patient's food preferences and provide high-protein, high-caloric foods as appropriate.      INTERVENTIONS:  - Monitor oral intake, urinary output, labs, and treatment plans  - Assess nutrition and hydration status and recommend course of action  - Evaluate amount of meals eaten  - Assist patient with eating if necessary   - Allow adequate time for meals  - Recommend/ encourage appropriate diets, oral nutritional supplements, and vitamin/mineral supplements  - Order, calculate, and assess calorie counts as needed  - Recommend, monitor, and adjust tube feedings and TPN/PPN based on assessed needs  - Assess need for intravenous fluids  - Provide specific nutrition/hydration education as appropriate  - Include patient/family/caregiver in decisions related to nutrition  Outcome: Progressing

## 2023-09-13 NOTE — ASSESSMENT & PLAN NOTE
· Findings more concerning for chronic/ poorly controlled lymphedema of the pannus  · Afebrile and without leukocytosis  · Will give IV diuresis while hospitalized to better control her edema  · ID evaluation appreciated  · Will continue to monitor off abx therapy

## 2023-09-13 NOTE — PROGRESS NOTES
Progress Note - [facility]   Daphnejulio Jorgensen 68 y.o. female MRN: [id number]  Unit/Bed#: -01 Encounter: [id number]      IMPRESSION & RECOMMENDATIONS:   1. Chronic lymphedema of the pannus. Poorly controlled. Patient with recent admissions for recurrent cellulitis and panniculitis despite several courses of antibiotics. Prior wound cultures showed growth of pseudomonas though she improved without coverage for this. She has no leukocytosis and remains afebrile. Exam findings seem most consistent with lymphedema. Consider possibility of a volume issue given her chronic dependent, bed bound status. She also has chronic weeping wounds which I suspect continue to irritate the skin. Patient reports improvement in the past with IV lasix though it is difficult to assess her volume status given morbid obesity.   -continue to monitor off antibiotics  -consider diuresis   -continue PO vancomycin ppx for an additional 48h  -serial skin exams and aggressive local wound care   -may need to consider long term placement as patient is unable to manage edema and wound care at home      2. Mild leukocytosis. Now normal. She remains afebrile. Blood cultures are negative to date.  -continue to follow up blood cultures   -monitor CBCd, temperature, and hemodynamics       3. Resolving right back, buttock cellulitis and right-sided panniculitis. Completed course of PO Linezolid after recent admission. No new infectious findings appreciated on exam.     4. Morbid obesity. Patient with BMI of 74.  Unfortunately this limits antibiotic delivery and makes dosing of abx also difficult.     5. Antibiotic allergies. Noted with severe allergies to penicillins and lower generation cephalosporins. Has tolerated cefepime. Unfortunately this limits antibiotic options.     6. History of C.  Difficile.  No active diarrhea currently. Rj Bradford will continue prophylaxis as above.  Monitor abdominal exam and stool output otherwise. Antibiotics:  D5 IV cefepime/vancomycin     I have discussed the above management plan in detail with patient. I have discussed the above management plan in detail with patient's RN, and the primary service, [facility].     24 Hour events:  Yesterday and overnight notes reviewed. Wbc improved off abx. Afebrile. Wounds stable. Subjective:  Patient has no fever, chills, sweats; no nausea, vomiting, diarrhea; no cough, shortness of breath; no pain. No new symptoms. Stable off abx. Objective:  Vitals:  Temp:  [97.6 °F (36.4 °C)-98.3 °F (36.8 °C)] 98.3 °F (36.8 °C)  HR:  [62-72] 65  Resp:  [18-20] 20  BP: ()/(51-68) 128/68  SpO2:  [91 %-94 %] 94 %  Temp (24hrs), Av.9 °F (36.6 °C), Min:97.6 °F (36.4 °C), Max:98.3 °F (36.8 °C)  Current: Temperature: 98.3 °F (36.8 °C)    PHYSICAL EXAM:  General Appearance:  Appearing chronically ill, morbidly obese, nontoxic, and in no distress   HEENT: Normocephalic, without obvious abnormality, atraumatic. Conjunctiva pink and sclera anicteric. Oropharynx moist without lesions. Lungs:   Clear to auscultation bilaterally, respirations unlabored   Heart:  RRR; no murmur, rub or gallop   Abdomen:   Soft, non-tender, obese, non-distended, positive bowel sounds    Extremities: No cyanosis, clubbing; chronic lymphedema noted. Musculoskeletal: Back symmetric without curvature, ROM normal.    Skin: No rashes or lesions. No draining wounds noted. Pannus b/l lower quadrants with out warmth or erythema, no draining wounds. Peripheral IV intact without evidence of erythema, warmth, or exudate. LABS, IMAGING, & OTHER STUDIES:  Extensive review of the medical records in epic including review of the notes, radiographs, and laboratory results were reviewed personally as below. Lab Results:  I have personally reviewed pertinent labs.   Results from last 7 days   Lab Units 23  0611 23  0550 23  0536   WBC Thousand/uL 9.84 11.09* 12.79* HEMOGLOBIN g/dL 10.0* 9.7* 9.8*   PLATELETS Thousands/uL 593* 581* 605*     Results from last 7 days   Lab Units 09/13/23  0611 09/12/23  0550 09/11/23  0536 09/10/23  0616 09/09/23  1325   SODIUM mmol/L 138 137 136   < > 135   POTASSIUM mmol/L 4.1 4.6 4.7   < > 3.5   CHLORIDE mmol/L 101 101 100   < > 98   CO2 mmol/L 31 29 31   < > 32   BUN mg/dL 24 24 23   < > 24   CREATININE mg/dL 1.11 1.14 1.32*   < > 1.14   EGFR ml/min/1.73sq m 49 48 40   < > 48   CALCIUM mg/dL 8.0* 7.9* 8.1*   < > 7.9*   AST U/L  --   --   --   --  29   ALT U/L  --   --   --   --  19   ALK PHOS U/L  --   --   --   --  74    < > = values in this interval not displayed. Results from last 7 days   Lab Units 09/09/23  1331 09/09/23  1325   BLOOD CULTURE  No Growth at 72 hrs. No Growth at 72 hrs. Results from last 7 days   Lab Units 09/09/23  1325   PROCALCITONIN ng/ml 0.31*                   Imaging Studies:   I have personally reviewed pertinent imaging study reports and images in PACS. Other Studies:   I have personally reviewed pertinent report including blood cultures.

## 2023-09-13 NOTE — ASSESSMENT & PLAN NOTE
· Continue home Midodrine 2.5mg TID  · Patient requesting Lasix be changed to IV while inpatient to aide in her lymphedema  · Will continue careful monitoring of her fluid status and renal function

## 2023-09-14 LAB
ANION GAP SERPL CALCULATED.3IONS-SCNC: 5 MMOL/L
BACTERIA BLD CULT: NORMAL
BACTERIA BLD CULT: NORMAL
BUN SERPL-MCNC: 25 MG/DL (ref 5–25)
CALCIUM SERPL-MCNC: 8.3 MG/DL (ref 8.4–10.2)
CHLORIDE SERPL-SCNC: 101 MMOL/L (ref 96–108)
CO2 SERPL-SCNC: 32 MMOL/L (ref 21–32)
CREAT SERPL-MCNC: 1.23 MG/DL (ref 0.6–1.3)
ERYTHROCYTE [DISTWIDTH] IN BLOOD BY AUTOMATED COUNT: 18.4 % (ref 11.6–15.1)
GFR SERPL CREATININE-BSD FRML MDRD: 43 ML/MIN/1.73SQ M
GLUCOSE SERPL-MCNC: 108 MG/DL (ref 65–140)
HCT VFR BLD AUTO: 35.7 % (ref 34.8–46.1)
HGB BLD-MCNC: 10.1 G/DL (ref 11.5–15.4)
INR PPP: 2.66 (ref 0.84–1.19)
MCH RBC QN AUTO: 24.3 PG (ref 26.8–34.3)
MCHC RBC AUTO-ENTMCNC: 28.3 G/DL (ref 31.4–37.4)
MCV RBC AUTO: 86 FL (ref 82–98)
PLATELET # BLD AUTO: 574 THOUSANDS/UL (ref 149–390)
PMV BLD AUTO: 8.4 FL (ref 8.9–12.7)
POTASSIUM SERPL-SCNC: 4.3 MMOL/L (ref 3.5–5.3)
PROTHROMBIN TIME: 28.5 SECONDS (ref 11.6–14.5)
RBC # BLD AUTO: 4.15 MILLION/UL (ref 3.81–5.12)
SODIUM SERPL-SCNC: 138 MMOL/L (ref 135–147)
WBC # BLD AUTO: 9.34 THOUSAND/UL (ref 4.31–10.16)

## 2023-09-14 PROCEDURE — 85027 COMPLETE CBC AUTOMATED: CPT | Performed by: INTERNAL MEDICINE

## 2023-09-14 PROCEDURE — 80048 BASIC METABOLIC PNL TOTAL CA: CPT | Performed by: INTERNAL MEDICINE

## 2023-09-14 PROCEDURE — 85610 PROTHROMBIN TIME: CPT | Performed by: PHYSICIAN ASSISTANT

## 2023-09-14 PROCEDURE — 99231 SBSQ HOSP IP/OBS SF/LOW 25: CPT | Performed by: INTERNAL MEDICINE

## 2023-09-14 RX ORDER — FUROSEMIDE 10 MG/ML
40 INJECTION INTRAMUSCULAR; INTRAVENOUS ONCE
Status: COMPLETED | OUTPATIENT
Start: 2023-09-14 | End: 2023-09-14

## 2023-09-14 RX ADMIN — MIDODRINE HYDROCHLORIDE 2.5 MG: 5 TABLET ORAL at 06:02

## 2023-09-14 RX ADMIN — NYSTATIN: 100000 POWDER TOPICAL at 08:03

## 2023-09-14 RX ADMIN — MIDODRINE HYDROCHLORIDE 2.5 MG: 5 TABLET ORAL at 10:54

## 2023-09-14 RX ADMIN — LEVOTHYROXINE SODIUM 125 MCG: 25 TABLET ORAL at 06:02

## 2023-09-14 RX ADMIN — GABAPENTIN 100 MG: 100 CAPSULE ORAL at 21:39

## 2023-09-14 RX ADMIN — FUROSEMIDE 40 MG: 10 INJECTION, SOLUTION INTRAMUSCULAR; INTRAVENOUS at 12:03

## 2023-09-14 RX ADMIN — DULOXETINE HYDROCHLORIDE 20 MG: 20 CAPSULE, DELAYED RELEASE ORAL at 08:03

## 2023-09-14 RX ADMIN — ALLOPURINOL 100 MG: 100 TABLET ORAL at 08:03

## 2023-09-14 RX ADMIN — GABAPENTIN 100 MG: 100 CAPSULE ORAL at 08:03

## 2023-09-14 RX ADMIN — NYSTATIN: 100000 POWDER TOPICAL at 17:55

## 2023-09-14 RX ADMIN — MIDODRINE HYDROCHLORIDE 2.5 MG: 5 TABLET ORAL at 17:55

## 2023-09-14 RX ADMIN — WARFARIN SODIUM 4 MG: 2 TABLET ORAL at 17:53

## 2023-09-14 RX ADMIN — GABAPENTIN 100 MG: 100 CAPSULE ORAL at 17:53

## 2023-09-14 NOTE — PLAN OF CARE
Problem: MOBILITY - ADULT  Goal: Maintain or return to baseline ADL function  Description: INTERVENTIONS:  -  Assess patient's ability to carry out ADLs; assess patient's baseline for ADL function and identify physical deficits which impact ability to perform ADLs (bathing, care of mouth/teeth, toileting, grooming, dressing, etc.)  - Assess/evaluate cause of self-care deficits   - Assess range of motion  - Assess patient's mobility; develop plan if impaired  - Assess patient's need for assistive devices and provide as appropriate  - Encourage maximum independence but intervene and supervise when necessary  - Involve family in performance of ADLs  - Assess for home care needs following discharge   - Consider OT consult to assist with ADL evaluation and planning for discharge  - Provide patient education as appropriate  Outcome: Progressing  Goal: Maintains/Returns to pre admission functional level  Description: INTERVENTIONS:  - Perform BMAT or MOVE assessment daily.   - Set and communicate daily mobility goal to care team and patient/family/caregiver. - Collaborate with rehabilitation services on mobility goals if consulted  - Perform Range of Motion 3 times a day. - Reposition patient every 2 hours.   - Dangle patient 3 times a day  - Stand patient 3 times a day  - Ambulate patient 3 times a day  - Out of bed to chair 3 times a day   - Out of bed for meals 3 times a day  - Out of bed for toileting  - Record patient progress and toleration of activity level   Outcome: Progressing     Problem: PAIN - ADULT  Goal: Verbalizes/displays adequate comfort level or baseline comfort level  Description: Interventions:  - Encourage patient to monitor pain and request assistance  - Assess pain using appropriate pain scale  - Administer analgesics based on type and severity of pain and evaluate response  - Implement non-pharmacological measures as appropriate and evaluate response  - Consider cultural and social influences on pain and pain management  - Notify physician/advanced practitioner if interventions unsuccessful or patient reports new pain  Outcome: Progressing     Problem: INFECTION - ADULT  Goal: Absence or prevention of progression during hospitalization  Description: INTERVENTIONS:  - Assess and monitor for signs and symptoms of infection  - Monitor lab/diagnostic results  - Monitor all insertion sites, i.e. indwelling lines, tubes, and drains  - Monitor endotracheal if appropriate and nasal secretions for changes in amount and color  - [place] appropriate cooling/warming therapies per order  - Administer medications as ordered  - Instruct and encourage patient and family to use good hand hygiene technique  - Identify and instruct in appropriate isolation precautions for identified infection/condition  Outcome: Progressing  Goal: Absence of fever/infection during neutropenic period  Description: INTERVENTIONS:  - Monitor WBC    Outcome: Progressing     Problem: SAFETY ADULT  Goal: Maintain or return to baseline ADL function  Description: INTERVENTIONS:  -  Assess patient's ability to carry out ADLs; assess patient's baseline for ADL function and identify physical deficits which impact ability to perform ADLs (bathing, care of mouth/teeth, toileting, grooming, dressing, etc.)  - Assess/evaluate cause of self-care deficits   - Assess range of motion  - Assess patient's mobility; develop plan if impaired  - Assess patient's need for assistive devices and provide as appropriate  - Encourage maximum independence but intervene and supervise when necessary  - Involve family in performance of ADLs  - Assess for home care needs following discharge   - Consider OT consult to assist with ADL evaluation and planning for discharge  - Provide patient education as appropriate  Outcome: Progressing  Goal: Maintains/Returns to pre admission functional level  Description: INTERVENTIONS:  - Perform BMAT or MOVE assessment daily.   - Set and communicate daily mobility goal to care team and patient/family/caregiver. - Collaborate with rehabilitation services on mobility goals if consulted  - Perform Range of Motion 3 times a day. - Reposition patient every 2 hours.   - Dangle patient 3 times a day  - Stand patient 3 times a day  - Ambulate patient 3 times a day  - Out of bed to chair 3 times a day   - Out of bed for meals 3 times a day  - Out of bed for toileting  - Record patient progress and toleration of activity level   Outcome: Progressing  Goal: Patient will remain free of falls  Description: INTERVENTIONS:  - Educate patient/family on patient safety including physical limitations  - Instruct patient to call for assistance with activity   - Consult OT/PT to assist with strengthening/mobility   - Keep Call bell within reach  - Keep bed low and locked with side rails adjusted as appropriate  - Keep care items and personal belongings within reach  - Initiate and maintain comfort rounds  - Make Fall Risk Sign visible to staff  - Offer Toileting every 2 Hours, in advance of need  - Initiate/Maintain bed alarm  - Obtain necessary fall risk management equipment:   - Apply yellow socks and bracelet for high fall risk patients  - Consider moving patient to room near nurses station  Outcome: Progressing     Problem: DISCHARGE PLANNING  Goal: Discharge to home or other facility with appropriate resources  Description: INTERVENTIONS:  - Identify barriers to discharge w/patient and caregiver  - Arrange for needed discharge resources and transportation as appropriate  - Identify discharge learning needs (meds, wound care, etc.)  - Arrange for interpretive services to assist at discharge as needed  - Refer to Case Management Department for coordinating discharge planning if the patient needs post-hospital services based on physician/advanced practitioner order or complex needs related to functional status, cognitive ability, or social support system  Outcome: Progressing     Problem: Knowledge Deficit  Goal: Patient/family/caregiver demonstrates understanding of disease process, treatment plan, medications, and discharge instructions  Description: Complete learning assessment and assess knowledge base.   Interventions:  - Provide teaching at level of understanding  - Provide teaching via preferred learning methods  Outcome: Progressing     Problem: METABOLIC, FLUID AND ELECTROLYTES - ADULT  Goal: Electrolytes maintained within normal limits  Description: INTERVENTIONS:  - Monitor labs and assess patient for signs and symptoms of electrolyte imbalances  - Administer electrolyte replacement as ordered  - Monitor response to electrolyte replacements, including repeat lab results as appropriate  - Instruct patient on fluid and nutrition as appropriate  Outcome: Progressing  Goal: Fluid balance maintained  Description: INTERVENTIONS:  - Monitor labs   - Monitor I/O and WT  - Instruct patient on fluid and nutrition as appropriate  - Assess for signs & symptoms of volume excess or deficit  Outcome: Progressing     Problem: SKIN/TISSUE INTEGRITY - ADULT  Goal: Skin Integrity remains intact(Skin Breakdown Prevention)  Description: Assess:  -Perform [place] assessment every   -Clean and moisturize skin every   -Inspect skin when repositioning, toileting, and assisting with ADLS  -Assess under medical devices such as  every   -Assess extremities for adequate circulation and sensation     Bed Management:  -Have minimal linens on bed & keep smooth, unwrinkled  -Change linens as needed when moist or perspiring  -Avoid sitting or lying in one position for more than  hours while in bed  -Keep HOB at degrees     Toileting:  -Offer bedside commode  -Assess for incontinence every   -Use incontinent care products after each incontinent episode such as     Activity:  -Mobilize patient  times a day  -Encourage activity and walks on unit  -Encourage or provide ROM exercises -Turn and reposition patient every  Hours  -Use appropriate equipment to lift or move patient in bed  -Instruct/ Assist with weight shifting every  when out of bed in chair  -Consider limitation of chair time  hour intervals    Skin Care:  -Avoid use of baby powder, tape, friction and shearing, hot water or constrictive clothing  -Relieve pressure over bony prominences using   -Do not massage red bony areas    Next Steps:  -Teach patient strategies to minimize risks such as    -Consider consults to  interdisciplinary teams such as   Outcome: Progressing  Goal: Incision(s), wounds(s) or drain site(s) healing without S/S of infection  Description: INTERVENTIONS  - Assess and document dressing, incision, wound bed, drain sites and surrounding tissue  - Provide patient and family education  - Perform skin care/dressing changes every   Outcome: Progressing     Problem: Prexisting or High Potential for Compromised Skin Integrity  Goal: Skin integrity is maintained or improved  Description: INTERVENTIONS:  - Identify patients at risk for skin breakdown  - Assess and monitor skin integrity  - Assess and monitor nutrition and hydration status  - Monitor labs   - Assess for incontinence   - Turn and reposition patient  - Assist with mobility/ambulation  - Relieve pressure over bony prominences  - Avoid friction and shearing  - Provide appropriate hygiene as needed including keeping skin clean and dry  - Evaluate need for skin moisturizer/barrier cream  - Collaborate with interdisciplinary team   - Patient/family teaching  - Consider wound care consult   Outcome: Progressing     Problem: Nutrition/Hydration-ADULT  Goal: Nutrient/Hydration intake appropriate for improving, restoring or maintaining nutritional needs  Description: Monitor and assess patient's nutrition/hydration status for malnutrition. Collaborate with interdisciplinary team and initiate plan and interventions as ordered.   Monitor patient's weight and dietary intake as ordered or per policy. Utilize nutrition screening tool and intervene as necessary. Determine patient's food preferences and provide high-protein, high-caloric foods as appropriate.      INTERVENTIONS:  - Monitor oral intake, urinary output, labs, and treatment plans  - Assess nutrition and hydration status and recommend course of action  - Evaluate amount of meals eaten  - Assist patient with eating if necessary   - Allow adequate time for meals  - Recommend/ encourage appropriate diets, oral nutritional supplements, and vitamin/mineral supplements  - Order, calculate, and assess calorie counts as needed  - Recommend, monitor, and adjust tube feedings and TPN/PPN based on assessed needs  - Assess need for intravenous fluids  - Provide specific nutrition/hydration education as appropriate  - Include patient/family/caregiver in decisions related to nutrition  Outcome: Progressing

## 2023-09-14 NOTE — PROGRESS NOTES
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 · Venous Duplex (9/12): No evidence of acute or chronic deep vein thrombosis. No evidence of superficial thrombophlebitis noted. · Again, will treat with IV diuresis as above for chronic lymphedema     Chronic atrial fibrillation (HCC)  Assessment & Plan  · Rate-controlled without AV kiko blocking agents  · Continue Warfarin 2mg SMWFS and 4mg TT    Chronic diastolic congestive heart failure (HCC)  Assessment & Plan  · Continue home Midodrine 2.5mg TID  · Patient requesting Lasix be changed to IV while inpatient to aide in her lymphedema  · Will continue careful monitoring of her fluid status and renal function     Class 3 severe obesity due to excess calories with serious comorbidity and body mass index (BMI) greater than or equal to 70 in adult [facility])  Assessment & Plan  · BMI of 74.55  · Encouraged lifestyle modifications and weight loss  · Calorie restricted diet         VTE Pharmacologic Prophylaxis: VTE Score: 5 High Risk (Score >/= 5) - Pharmacological DVT Prophylaxis Ordered: warfarin (Coumadin). Sequential Compression Devices Ordered. Patient Centered Rounds: I performed bedside rounds with nursing staff today. Discussions with Specialists or Other Care Team Provider: NABEEL    Education and Discussions with Family / Patient: Patient updated on plan of care.      Total Time Spent on Date of Encounter in care of patient: 35 mins. This time was spent on one or more of the following: performing physical exam; counseling and coordination of care; obtaining or reviewing history; documenting in the medical record; reviewing/ordering tests, medications or procedures; communicating with other healthcare professionals and discussing with patient's family/caregivers. Current Length of Stay: 5 day(s)  Current Patient Status: Inpatient   Certification Statement: The patient will continue to require additional inpatient hospital stay due to awaiting placement  Discharge Plan: Patient is medically cleared for discharge when safe disposition is arranged. Either home with care giver or SNF. Code Status: Level 1 - Full Code    Subjective:   Patient resting in bed without any acute complaints. No significant over night events reported by nursing. Objective:     Vitals:   Temp (24hrs), Av.8 °F (36.6 °C), Min:97.7 °F (36.5 °C), Max:97.8 °F (36.6 °C)    Temp:  [97.7 °F (36.5 °C)-97.8 °F (36.6 °C)] 97.7 °F (36.5 °C)  HR:  [56-71] 60  Resp:  [18-20] 18  BP: ()/(47-59) 109/58  SpO2:  [92 %-96 %] 92 %  Body mass index is 74.55 kg/m². Input and Output Summary (last 24 hours): Intake/Output Summary (Last 24 hours) at 2023 1001  Last data filed at 2023 2300  Gross per 24 hour   Intake --   Output 800 ml   Net -800 ml       Physical Exam:   Physical Exam  Vitals and nursing note reviewed. Constitutional:       General: She is not in acute distress. Appearance: She is well-developed. She is obese. HENT:      Head: Normocephalic and atraumatic. Mouth/Throat:      Mouth: Mucous membranes are moist.      Pharynx: Oropharynx is clear. Eyes:      Extraocular Movements: Extraocular movements intact. Conjunctiva/sclera: Conjunctivae normal.   Cardiovascular:      Rate and Rhythm: Normal rate. Rhythm irregular. Pulses: Normal pulses. Heart sounds: Normal heart sounds.  No murmur heard.  Pulmonary:      Effort: Pulmonary effort is normal. No respiratory distress. Breath sounds: Normal breath sounds. Abdominal:      General: Bowel sounds are normal. There is no distension. Palpations: Abdomen is soft. Tenderness: There is no abdominal tenderness. Musculoskeletal:         General: Swelling present. Cervical back: Normal range of motion and neck supple. Skin:     General: Skin is warm and dry. Capillary Refill: Capillary refill takes less than 2 seconds. Comments: Chronic pannus lymphedema   Neurological:      General: No focal deficit present. Mental Status: She is alert and oriented to person, place, and time. Mental status is at baseline. Psychiatric:         Mood and Affect: Mood normal.         Behavior: Behavior normal.         Judgment: Judgment normal.          Labs:  Results from last 7 days   Lab Units 09/14/23  0607 09/12/23  0550 09/11/23  0536   WBC Thousand/uL 9.34   < > 12.79*   HEMOGLOBIN g/dL 10.1*   < > 9.8*   HEMATOCRIT % 35.7   < > 35.5   PLATELETS Thousands/uL 574*   < > 605*   NEUTROS PCT %  --   --  80*   LYMPHS PCT %  --   --  7*   MONOS PCT %  --   --  5   EOS PCT %  --   --  5    < > = values in this interval not displayed. Results from last 7 days   Lab Units 09/14/23  0607 09/10/23  0616 09/09/23  1325   SODIUM mmol/L 138   < > 135   POTASSIUM mmol/L 4.3   < > 3.5   CHLORIDE mmol/L 101   < > 98   CO2 mmol/L 32   < > 32   BUN mg/dL 25   < > 24   CREATININE mg/dL 1.23   < > 1.14   ANION GAP mmol/L 5   < > 5   CALCIUM mg/dL 8.3*   < > 7.9*   ALBUMIN g/dL  --   --  2.5*   TOTAL BILIRUBIN mg/dL  --   --  0.50   ALK PHOS U/L  --   --  74   ALT U/L  --   --  19   AST U/L  --   --  29   GLUCOSE RANDOM mg/dL 108   < > 108    < > = values in this interval not displayed.      Results from last 7 days   Lab Units 09/14/23  0607   INR  2.66*             Results from last 7 days   Lab Units 09/09/23  1325   LACTIC ACID mmol/L 1.1 PROCALCITONIN ng/ml 0.31*       Lines/Drains:  Invasive Devices     Peripheral Intravenous Line  Duration           Peripheral IV 09/09/23 Right Antecubital 4 days          Drain  Duration           External Urinary Catheter 1 day                Imaging: No new imaging. Recent Cultures (last 7 days):   Results from last 7 days   Lab Units 09/09/23  1331 09/09/23  1325   BLOOD CULTURE  No Growth After 4 Days. No Growth After 4 Days. Last 24 Hours Medication List:   Current Facility-Administered Medications   Medication Dose Route Frequency Provider Last Rate   • acetaminophen  650 mg Oral Q4H PRN Gigit Cardinal, DO     • allopurinol  100 mg Oral Daily Gigitus Cardinal, DO     • docusate sodium  100 mg Oral Daily Gigitus Cardinal, DO     • DULoxetine  20 mg Oral Daily Gigitus Cardinal, DO     • furosemide  40 mg Intravenous Once Dorota Adams DO     • gabapentin  100 mg Oral TID Tc Cardinal, DO     • levothyroxine  125 mcg Oral Early Morning Gigitus Cardinal, DO     • midodrine  2.5 mg Oral TID AC Gigit Cardinal, DO     • nystatin   Topical BID Tc Cardinal, DO     • ondansetron  4 mg Intravenous Q6H PRN Tc Cardinal, DO     • warfarin  2 mg Oral Once per day on Sun Mon Wed Fri Sat Tc Murdock, DO     • warfarin  4 mg Oral Once per day on Tue Thu Thiago Medrano DO          Today, Patient Was Seen By: Dorota Adams DO    **Please Note: This note may have been constructed using a voice recognition system. **

## 2023-09-14 NOTE — PLAN OF CARE
Problem: MOBILITY - ADULT  Goal: Maintain or return to baseline ADL function  Description: INTERVENTIONS:  -  Assess patient's ability to carry out ADLs; assess patient's baseline for ADL function and identify physical deficits which impact ability to perform ADLs (bathing, care of mouth/teeth, toileting, grooming, dressing, etc.)  - Assess/evaluate cause of self-care deficits   - Assess range of motion  - Assess patient's mobility; develop plan if impaired  - Assess patient's need for assistive devices and provide as appropriate  - Encourage maximum independence but intervene and supervise when necessary  - Involve family in performance of ADLs  - Assess for home care needs following discharge   - Consider OT consult to assist with ADL evaluation and planning for discharge  - Provide patient education as appropriate  Outcome: Progressing  Goal: Maintains/Returns to pre admission functional level  Description: INTERVENTIONS:  - Perform BMAT or MOVE assessment daily.   - Set and communicate daily mobility goal to care team and patient/family/caregiver. - Collaborate with rehabilitation services on mobility goals if consulted  - Perform Range of Motion  times a day. - Reposition patient every  hours.   - Dangle patient  times a day  - Stand patient  times a day  - Ambulate patient  times a day  - Out of bed to chair  times a day   - Out of bed for meals  times a day  - Out of bed for toileting  - Record patient progress and toleration of activity level   Outcome: Progressing     Problem: PAIN - ADULT  Goal: Verbalizes/displays adequate comfort level or baseline comfort level  Description: Interventions:  - Encourage patient to monitor pain and request assistance  - Assess pain using appropriate pain scale  - Administer analgesics based on type and severity of pain and evaluate response  - Implement non-pharmacological measures as appropriate and evaluate response  - Consider cultural and social influences on pain and pain management  - Notify physician/advanced practitioner if interventions unsuccessful or patient reports new pain  Outcome: Progressing     Problem: INFECTION - ADULT  Goal: Absence or prevention of progression during hospitalization  Description: INTERVENTIONS:  - Assess and monitor for signs and symptoms of infection  - Monitor lab/diagnostic results  - Monitor all insertion sites, i.e. indwelling lines, tubes, and drains  - Monitor endotracheal if appropriate and nasal secretions for changes in amount and color  - [place] appropriate cooling/warming therapies per order  - Administer medications as ordered  - Instruct and encourage patient and family to use good hand hygiene technique  - Identify and instruct in appropriate isolation precautions for identified infection/condition  Outcome: Progressing  Goal: Absence of fever/infection during neutropenic period  Description: INTERVENTIONS:  - Monitor WBC    Outcome: Progressing     Problem: SAFETY ADULT  Goal: Maintain or return to baseline ADL function  Description: INTERVENTIONS:  -  Assess patient's ability to carry out ADLs; assess patient's baseline for ADL function and identify physical deficits which impact ability to perform ADLs (bathing, care of mouth/teeth, toileting, grooming, dressing, etc.)  - Assess/evaluate cause of self-care deficits   - Assess range of motion  - Assess patient's mobility; develop plan if impaired  - Assess patient's need for assistive devices and provide as appropriate  - Encourage maximum independence but intervene and supervise when necessary  - Involve family in performance of ADLs  - Assess for home care needs following discharge   - Consider OT consult to assist with ADL evaluation and planning for discharge  - Provide patient education as appropriate  Outcome: Progressing  Goal: Maintains/Returns to pre admission functional level  Description: INTERVENTIONS:  - Perform BMAT or MOVE assessment daily.   - Set and communicate daily mobility goal to care team and patient/family/caregiver. - Collaborate with rehabilitation services on mobility goals if consulted  - Perform Range of Motion  times a day. - Reposition patient every  hours.   - Dangle patient  times a day  - Stand patient  times a day  - Ambulate patient  times a day  - Out of bed to chair  times a day   - Out of bed for meals  times a day  - Out of bed for toileting  - Record patient progress and toleration of activity level   Outcome: Progressing  Goal: Patient will remain free of falls  Description: INTERVENTIONS:  - Educate patient/family on patient safety including physical limitations  - Instruct patient to call for assistance with activity   - Consult OT/PT to assist with strengthening/mobility   - Keep Call bell within reach  - Keep bed low and locked with side rails adjusted as appropriate  - Keep care items and personal belongings within reach  - Initiate and maintain comfort rounds  - Make Fall Risk Sign visible to staff  - Offer Toileting every  Hours, in advance of need  - Initiate/Maintain alarm  - Obtain necessary fall risk management equipment:   - Apply yellow socks and bracelet for high fall risk patients  - Consider moving patient to room near nurses station  Outcome: Progressing     Problem: DISCHARGE PLANNING  Goal: Discharge to home or other facility with appropriate resources  Description: INTERVENTIONS:  - Identify barriers to discharge w/patient and caregiver  - Arrange for needed discharge resources and transportation as appropriate  - Identify discharge learning needs (meds, wound care, etc.)  - Arrange for interpretive services to assist at discharge as needed  - Refer to Case Management Department for coordinating discharge planning if the patient needs post-hospital services based on physician/advanced practitioner order or complex needs related to functional status, cognitive ability, or social support system  Outcome: Progressing Problem: Knowledge Deficit  Goal: Patient/family/caregiver demonstrates understanding of disease process, treatment plan, medications, and discharge instructions  Description: Complete learning assessment and assess knowledge base.   Interventions:  - Provide teaching at level of understanding  - Provide teaching via preferred learning methods  Outcome: Progressing     Problem: METABOLIC, FLUID AND ELECTROLYTES - ADULT  Goal: Electrolytes maintained within normal limits  Description: INTERVENTIONS:  - Monitor labs and assess patient for signs and symptoms of electrolyte imbalances  - Administer electrolyte replacement as ordered  - Monitor response to electrolyte replacements, including repeat lab results as appropriate  - Instruct patient on fluid and nutrition as appropriate  Outcome: Progressing  Goal: Fluid balance maintained  Description: INTERVENTIONS:  - Monitor labs   - Monitor I/O and WT  - Instruct patient on fluid and nutrition as appropriate  - Assess for signs & symptoms of volume excess or deficit  Outcome: Progressing     Problem: SKIN/TISSUE INTEGRITY - ADULT  Goal: Skin Integrity remains intact(Skin Breakdown Prevention)  Description: Assess:  -Perform Gucci assessment every   -Clean and moisturize skin every   -Inspect skin when repositioning, toileting, and assisting with ADLS  -Assess under medical devices such as  every   -Assess extremities for adequate circulation and sensation     Bed Management:  -Have minimal linens on bed & keep smooth, unwrinkled  -Change linens as needed when moist or perspiring  -Avoid sitting or lying in one position for more than  hours while in bed  -Keep HOB at degrees     Toileting:  -Offer bedside commode  -Assess for incontinence every   -Use incontinent care products after each incontinent episode such as     Activity:  -Mobilize patient  times a day  -Encourage activity and walks on unit  -Encourage or provide ROM exercises   -Turn and reposition patient every  Hours  -Use appropriate equipment to lift or move patient in bed  -Instruct/ Assist with weight shifting every  when out of bed in chair  -Consider limitation of chair time  hour intervals    Skin Care:  -Avoid use of baby powder, tape, friction and shearing, hot water or constrictive clothing  -Relieve pressure over bony prominences using   -Do not massage red bony areas    Next Steps:  -Teach patient strategies to minimize risks such as    -Consider consults to  interdisciplinary teams such as   Outcome: Progressing  Goal: Incision(s), wounds(s) or drain site(s) healing without S/S of infection  Description: INTERVENTIONS  - Assess and document dressing, incision, wound bed, drain sites and surrounding tissue  - Provide patient and family education  - Perform skin care/dressing changes every  Outcome: Progressing     Problem: Prexisting or High Potential for Compromised Skin Integrity  Goal: Skin integrity is maintained or improved  Description: INTERVENTIONS:  - Identify patients at risk for skin breakdown  - Assess and monitor skin integrity  - Assess and monitor nutrition and hydration status  - Monitor labs   - Assess for incontinence   - Turn and reposition patient  - Assist with mobility/ambulation  - Relieve pressure over bony prominences  - Avoid friction and shearing  - Provide appropriate hygiene as needed including keeping skin clean and dry  - Evaluate need for skin moisturizer/barrier cream  - Collaborate with interdisciplinary team   - Patient/family teaching  - Consider wound care consult   Outcome: Progressing     Problem: Nutrition/Hydration-ADULT  Goal: Nutrient/Hydration intake appropriate for improving, restoring or maintaining nutritional needs  Description: Monitor and assess patient's nutrition/hydration status for malnutrition. Collaborate with interdisciplinary team and initiate plan and interventions as ordered. Monitor patient's weight and dietary intake as ordered or per policy. Utilize nutrition screening tool and intervene as necessary. Determine patient's food preferences and provide high-protein, high-caloric foods as appropriate.      INTERVENTIONS:  - Monitor oral intake, urinary output, labs, and treatment plans  - Assess nutrition and hydration status and recommend course of action  - Evaluate amount of meals eaten  - Assist patient with eating if necessary   - Allow adequate time for meals  - Recommend/ encourage appropriate diets, oral nutritional supplements, and vitamin/mineral supplements  - Order, calculate, and assess calorie counts as needed  - Recommend, monitor, and adjust tube feedings and TPN/PPN based on assessed needs  - Assess need for intravenous fluids  - Provide specific nutrition/hydration education as appropriate  - Include patient/family/caregiver in decisions related to nutrition  Outcome: Progressing

## 2023-09-14 NOTE — QUICK NOTE
Pt chart, vitals, labs reviewed. Stable off of abx. Received IV lasix x1 yesterday. She remains afebrile without leukocytosis. ID service will continue to follow and see again in person tomorrow.

## 2023-09-15 LAB
ANION GAP SERPL CALCULATED.3IONS-SCNC: 6 MMOL/L
BUN SERPL-MCNC: 25 MG/DL (ref 5–25)
CALCIUM SERPL-MCNC: 8.5 MG/DL (ref 8.4–10.2)
CHLORIDE SERPL-SCNC: 101 MMOL/L (ref 96–108)
CO2 SERPL-SCNC: 31 MMOL/L (ref 21–32)
CREAT SERPL-MCNC: 1.15 MG/DL (ref 0.6–1.3)
ERYTHROCYTE [DISTWIDTH] IN BLOOD BY AUTOMATED COUNT: 18.3 % (ref 11.6–15.1)
GFR SERPL CREATININE-BSD FRML MDRD: 47 ML/MIN/1.73SQ M
GLUCOSE SERPL-MCNC: 99 MG/DL (ref 65–140)
HCT VFR BLD AUTO: 35 % (ref 34.8–46.1)
HGB BLD-MCNC: 10 G/DL (ref 11.5–15.4)
MCH RBC QN AUTO: 24.2 PG (ref 26.8–34.3)
MCHC RBC AUTO-ENTMCNC: 28.6 G/DL (ref 31.4–37.4)
MCV RBC AUTO: 85 FL (ref 82–98)
PLATELET # BLD AUTO: 576 THOUSANDS/UL (ref 149–390)
PMV BLD AUTO: 8.3 FL (ref 8.9–12.7)
POTASSIUM SERPL-SCNC: 3.7 MMOL/L (ref 3.5–5.3)
RBC # BLD AUTO: 4.13 MILLION/UL (ref 3.81–5.12)
SODIUM SERPL-SCNC: 138 MMOL/L (ref 135–147)
WBC # BLD AUTO: 12.18 THOUSAND/UL (ref 4.31–10.16)

## 2023-09-15 PROCEDURE — 85027 COMPLETE CBC AUTOMATED: CPT | Performed by: INTERNAL MEDICINE

## 2023-09-15 PROCEDURE — 99232 SBSQ HOSP IP/OBS MODERATE 35: CPT | Performed by: PHYSICIAN ASSISTANT

## 2023-09-15 PROCEDURE — 80048 BASIC METABOLIC PNL TOTAL CA: CPT | Performed by: INTERNAL MEDICINE

## 2023-09-15 PROCEDURE — 99232 SBSQ HOSP IP/OBS MODERATE 35: CPT | Performed by: INTERNAL MEDICINE

## 2023-09-15 RX ORDER — FUROSEMIDE 10 MG/ML
40 INJECTION INTRAMUSCULAR; INTRAVENOUS ONCE
Status: COMPLETED | OUTPATIENT
Start: 2023-09-15 | End: 2023-09-15

## 2023-09-15 RX ADMIN — DULOXETINE HYDROCHLORIDE 20 MG: 20 CAPSULE, DELAYED RELEASE ORAL at 08:19

## 2023-09-15 RX ADMIN — ALLOPURINOL 100 MG: 100 TABLET ORAL at 08:19

## 2023-09-15 RX ADMIN — GABAPENTIN 100 MG: 100 CAPSULE ORAL at 16:59

## 2023-09-15 RX ADMIN — LEVOTHYROXINE SODIUM 125 MCG: 25 TABLET ORAL at 06:30

## 2023-09-15 RX ADMIN — DOCUSATE SODIUM 100 MG: 100 CAPSULE, LIQUID FILLED ORAL at 08:19

## 2023-09-15 RX ADMIN — MIDODRINE HYDROCHLORIDE 2.5 MG: 5 TABLET ORAL at 13:39

## 2023-09-15 RX ADMIN — FUROSEMIDE 40 MG: 10 INJECTION, SOLUTION INTRAMUSCULAR; INTRAVENOUS at 09:25

## 2023-09-15 RX ADMIN — MIDODRINE HYDROCHLORIDE 2.5 MG: 5 TABLET ORAL at 17:00

## 2023-09-15 RX ADMIN — NYSTATIN: 100000 POWDER TOPICAL at 17:01

## 2023-09-15 RX ADMIN — MIDODRINE HYDROCHLORIDE 2.5 MG: 5 TABLET ORAL at 06:30

## 2023-09-15 RX ADMIN — NYSTATIN: 100000 POWDER TOPICAL at 08:20

## 2023-09-15 RX ADMIN — GABAPENTIN 100 MG: 100 CAPSULE ORAL at 08:19

## 2023-09-15 RX ADMIN — GABAPENTIN 100 MG: 100 CAPSULE ORAL at 21:35

## 2023-09-15 RX ADMIN — WARFARIN SODIUM 2 MG: 2 TABLET ORAL at 17:00

## 2023-09-15 NOTE — PLAN OF CARE
Problem: MOBILITY - ADULT  Goal: Maintain or return to baseline ADL function  Description: INTERVENTIONS:  -  Assess patient's ability to carry out ADLs; assess patient's baseline for ADL function and identify physical deficits which impact ability to perform ADLs (bathing, care of mouth/teeth, toileting, grooming, dressing, etc.)  - Assess/evaluate cause of self-care deficits   - Assess range of motion  - Assess patient's mobility; develop plan if impaired  - Assess patient's need for assistive devices and provide as appropriate  - Encourage maximum independence but intervene and supervise when necessary  - Involve family in performance of ADLs  - Assess for home care needs following discharge   - Consider OT consult to assist with ADL evaluation and planning for discharge  - Provide patient education as appropriate  Outcome: Progressing  Goal: Maintains/Returns to pre admission functional level  Description: INTERVENTIONS:  - Perform BMAT or MOVE assessment daily.   - Set and communicate daily mobility goal to care team and patient/family/caregiver. - Collaborate with rehabilitation services on mobility goals if consulted  - Perform Range of Motion  times a day. - Reposition patient every  hours.   - Dangle patient  times a day  - Stand patient  times a day  - Ambulate patient  times a day  - Out of bed to chair  times a day   - Out of bed for meals  times a day  - Out of bed for toileting  - Record patient progress and toleration of activity level   Outcome: Progressing     Problem: PAIN - ADULT  Goal: Verbalizes/displays adequate comfort level or baseline comfort level  Description: Interventions:  - Encourage patient to monitor pain and request assistance  - Assess pain using appropriate pain scale  - Administer analgesics based on type and severity of pain and evaluate response  - Implement non-pharmacological measures as appropriate and evaluate response  - Consider cultural and social influences on pain and pain management  - Notify physician/advanced practitioner if interventions unsuccessful or patient reports new pain  Outcome: Progressing     Problem: INFECTION - ADULT  Goal: Absence or prevention of progression during hospitalization  Description: INTERVENTIONS:  - Assess and monitor for signs and symptoms of infection  - Monitor lab/diagnostic results  - Monitor all insertion sites, i.e. indwelling lines, tubes, and drains  - Monitor endotracheal if appropriate and nasal secretions for changes in amount and color  - [place] appropriate cooling/warming therapies per order  - Administer medications as ordered  - Instruct and encourage patient and family to use good hand hygiene technique  - Identify and instruct in appropriate isolation precautions for identified infection/condition  Outcome: Progressing  Goal: Absence of fever/infection during neutropenic period  Description: INTERVENTIONS:  - Monitor WBC    Outcome: Progressing     Problem: SAFETY ADULT  Goal: Maintain or return to baseline ADL function  Description: INTERVENTIONS:  -  Assess patient's ability to carry out ADLs; assess patient's baseline for ADL function and identify physical deficits which impact ability to perform ADLs (bathing, care of mouth/teeth, toileting, grooming, dressing, etc.)  - Assess/evaluate cause of self-care deficits   - Assess range of motion  - Assess patient's mobility; develop plan if impaired  - Assess patient's need for assistive devices and provide as appropriate  - Encourage maximum independence but intervene and supervise when necessary  - Involve family in performance of ADLs  - Assess for home care needs following discharge   - Consider OT consult to assist with ADL evaluation and planning for discharge  - Provide patient education as appropriate  Outcome: Progressing  Goal: Maintains/Returns to pre admission functional level  Description: INTERVENTIONS:  - Perform BMAT or MOVE assessment daily.   - Set and communicate daily mobility goal to care team and patient/family/caregiver. - Collaborate with rehabilitation services on mobility goals if consulted  - Perform Range of Motion  times a day. - Reposition patient every  hours.   - Dangle patient  times a day  - Stand patient  times a day  - Ambulate patient  times a day  - Out of bed to chair  times a day   - Out of bed for meals  times a day  - Out of bed for toileting  - Record patient progress and toleration of activity level   Outcome: Progressing  Goal: Patient will remain free of falls  Description: INTERVENTIONS:  - Educate patient/family on patient safety including physical limitations  - Instruct patient to call for assistance with activity   - Consult OT/PT to assist with strengthening/mobility   - Keep Call bell within reach  - Keep bed low and locked with side rails adjusted as appropriate  - Keep care items and personal belongings within reach  - Initiate and maintain comfort rounds  - Make Fall Risk Sign visible to staff  - Offer Toileting every  Hours, in advance of need  - Initiate/Maintain alarm  - Obtain necessary fall risk management equipment:   - Apply yellow socks and bracelet for high fall risk patients  - Consider moving patient to room near nurses station  Outcome: Progressing     Problem: DISCHARGE PLANNING  Goal: Discharge to home or other facility with appropriate resources  Description: INTERVENTIONS:  - Identify barriers to discharge w/patient and caregiver  - Arrange for needed discharge resources and transportation as appropriate  - Identify discharge learning needs (meds, wound care, etc.)  - Arrange for interpretive services to assist at discharge as needed  - Refer to Case Management Department for coordinating discharge planning if the patient needs post-hospital services based on physician/advanced practitioner order or complex needs related to functional status, cognitive ability, or social support system  Outcome: Progressing Problem: Knowledge Deficit  Goal: Patient/family/caregiver demonstrates understanding of disease process, treatment plan, medications, and discharge instructions  Description: Complete learning assessment and assess knowledge base.   Interventions:  - Provide teaching at level of understanding  - Provide teaching via preferred learning methods  Outcome: Progressing     Problem: METABOLIC, FLUID AND ELECTROLYTES - ADULT  Goal: Electrolytes maintained within normal limits  Description: INTERVENTIONS:  - Monitor labs and assess patient for signs and symptoms of electrolyte imbalances  - Administer electrolyte replacement as ordered  - Monitor response to electrolyte replacements, including repeat lab results as appropriate  - Instruct patient on fluid and nutrition as appropriate  Outcome: Progressing  Goal: Fluid balance maintained  Description: INTERVENTIONS:  - Monitor labs   - Monitor I/O and WT  - Instruct patient on fluid and nutrition as appropriate  - Assess for signs & symptoms of volume excess or deficit  Outcome: Progressing     Problem: SKIN/TISSUE INTEGRITY - ADULT  Goal: Skin Integrity remains intact(Skin Breakdown Prevention)  Description: Assess:  -Perform [place] assessment every   -Clean and moisturize skin every   -Inspect skin when repositioning, toileting, and assisting with ADLS  -Assess under medical devices such as  every   -Assess extremities for adequate circulation and sensation     Bed Management:  -Have minimal linens on bed & keep smooth, unwrinkled  -Change linens as needed when moist or perspiring  -Avoid sitting or lying in one position for more than  hours while in bed  -Keep HOB at degrees     Toileting:  -Offer bedside commode  -Assess for incontinence every   -Use incontinent care products after each incontinent episode such as     Activity:  -Mobilize patient  times a day  -Encourage activity and walks on unit  -Encourage or provide ROM exercises   -Turn and reposition patient every  Hours  -Use appropriate equipment to lift or move patient in bed  -Instruct/ Assist with weight shifting every  when out of bed in chair  -Consider limitation of chair time  hour intervals    Skin Care:  -Avoid use of baby powder, tape, friction and shearing, hot water or constrictive clothing  -Relieve pressure over bony prominences using   -Do not massage red bony areas    Next Steps:  -Teach patient strategies to minimize risks such as    -Consider consults to  interdisciplinary teams such as   Outcome: Progressing  Goal: Incision(s), wounds(s) or drain site(s) healing without S/S of infection  Description: INTERVENTIONS  - Assess and document dressing, incision, wound bed, drain sites and surrounding tissue  - Provide patient and family education  - Perform skin care/dressing changes every   Outcome: Progressing     Problem: Prexisting or High Potential for Compromised Skin Integrity  Goal: Skin integrity is maintained or improved  Description: INTERVENTIONS:  - Identify patients at risk for skin breakdown  - Assess and monitor skin integrity  - Assess and monitor nutrition and hydration status  - Monitor labs   - Assess for incontinence   - Turn and reposition patient  - Assist with mobility/ambulation  - Relieve pressure over bony prominences  - Avoid friction and shearing  - Provide appropriate hygiene as needed including keeping skin clean and dry  - Evaluate need for skin moisturizer/barrier cream  - Collaborate with interdisciplinary team   - Patient/family teaching  - Consider wound care consult   Outcome: Progressing     Problem: Nutrition/Hydration-ADULT  Goal: Nutrient/Hydration intake appropriate for improving, restoring or maintaining nutritional needs  Description: Monitor and assess patient's nutrition/hydration status for malnutrition. Collaborate with interdisciplinary team and initiate plan and interventions as ordered.   Monitor patient's weight and dietary intake as ordered or per policy. Utilize nutrition screening tool and intervene as necessary. Determine patient's food preferences and provide high-protein, high-caloric foods as appropriate.      INTERVENTIONS:  - Monitor oral intake, urinary output, labs, and treatment plans  - Assess nutrition and hydration status and recommend course of action  - Evaluate amount of meals eaten  - Assist patient with eating if necessary   - Allow adequate time for meals  - Recommend/ encourage appropriate diets, oral nutritional supplements, and vitamin/mineral supplements  - Order, calculate, and assess calorie counts as needed  - Recommend, monitor, and adjust tube feedings and TPN/PPN based on assessed needs  - Assess need for intravenous fluids  - Provide specific nutrition/hydration education as appropriate  - Include patient/family/caregiver in decisions related to nutrition  Outcome: Progressing

## 2023-09-15 NOTE — ASSESSMENT & PLAN NOTE
· Patient reporting left leg pain/tightness. · Venous Duplex (9/12): No evidence of acute or chronic deep vein thrombosis. No evidence of superficial thrombophlebitis noted.   · Patient has received IV diuresis as above for chronic lymphedema- will continue

## 2023-09-15 NOTE — CASE MANAGEMENT
Case Management Discharge Planning Note    Patient name Manoj Arch  Location [address] [facility] [place] 226/-[id number] MRN [id number]  : 1950 Date 9/15/2023       Current Admission Date: 2023  Current Admission Diagnosis:Cellulitis- Ruled Out   Patient Active Problem List    Diagnosis Date Noted   • Left leg pain 2023   • Shingles 2023   • Cellulitis- Ruled Out 2023   • Chronic atrial fibrillation ([address]) 2022   • History of Clostridioides difficile infection 2022   • Lymphedema of extremity 2021   • Other specified hypothyroidism 10/03/2020   • Mild episode of recurrent major depressive disorder ([address]) 10/03/2020   • Idiopathic chronic 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 10/03/2020   • Primary osteoarthritis involving multiple joints 10/03/2020   • Class 3 severe obesity due to excess calories with serious comorbidity and body mass index (BMI) greater than or equal to 70 in adult [facility]) 10/03/2020   • Chronic diastolic congestive heart failure ([address]) 10/03/2020   • Panniculitis 10/03/2020   • Gastroesophageal reflux disease without esophagitis 10/03/2020   • Hx MRSA infection 2020   • Impaired mobility 2019   • Osteoarthritis of glenohumeral joint 2019   • Left ovarian cyst 2017   • Depression with anxiety 07/15/2017   • Anemia 2016   • Ambulatory dysfunction 2016   • Adjustment disorder 2013   • Irritable bowel syndrome 2012   • Left atrial enlargement 2012   • Left ventricular hypertrophy 2012   • Carpal tunnel syndrome 2012   • Gout 2012   • Hyperlipidemia 2012   • Symptomatic menopausal or female climacteric states 2012      LOS (days): 6  Geometric Mean LOS (GMLOS) (days): 3.20  Days to GMLOS:-2.7     OBJECTIVE:  Risk of Unplanned Readmission Score: 25.46         Current admission status: Inpatient   Preferred Pharmacy:   [address] [place] [id number] [facility] [address]  Phone: [phone] Fax: [phone]    [address] [address]  Phone: [phone] Fax: [phone]    Primary Care Provider: Daphney Robbins MD    Primary Insurance: MEDICARE  Secondary Insurance: AARP    DISCHARGE DETAILS:    Discharge planning discussed with[de-identified] Patient    Would you like to participate in our [id number] [employer] service program?  : No - Declined    Treatment Team Recommendation: Home with [address] [facility]  Discharge Destination Plan[de-identified] Home with [address] [facility] at Discharge : BLS Ambulance        [facility]  ETA of Transport (Date): 09/18/23  ETA of Transport (Time): 0900         Additional Comments: Patient now indicates her caregiver will be available on Monday 9/18 in the AM to resume care. CM confirmed same with CG agency [facility]. CM arranged for 0900 BLS transport home on Monday 9/18 via [facility]. Pt aware and will inform CG.  Kaylee Fabian from [facility] aware and in agreement with plan

## 2023-09-15 NOTE — WOUND OSTOMY CARE
Progress Note - Wound   Jaxon Iba 68 y.o. female MRN: [id number]  Unit/Bed#: MS Veliz-01 Encounter: [id number]        Assessment:   Patient seen during skin prevalence study at the campus. Patient in bed on [address] [place] [address] for assessment. She is awake, alert and cooperative with care. Noted at time of assessment an increased area of erythema to the right back, buttocks and right hip lymphedema. Skin is scaly, flaky and dry. Hydraguard applied to the areas. Skin folds with linear areas of MASD to the breast, abdominal and knee folds. Right knee fold with scant serosanguineous drainage, partial thickness. Skin folds areas of skin breakdown do not appear to be improving at this time. Heels, sacrum and buttocks blanchable. Wound 03/22/23 Back Lateral;Left;Lower (Active)   Wound Description Dry;Beefy red;Fragile 09/15/23 1400   Alison-wound Assessment Scaly 09/15/23 1400   Wound Site Closure Open to air 09/14/23 0803   Drainage Amount Moderate 09/13/23 1033   Drainage Description Serous 09/13/23 1033   Treatments Cleansed 09/14/23 0803   Dressing ABD 09/13/23 1033   Dressing Changed Changed 09/13/23 1033   Patient Tolerance Tolerated well 09/13/23 1033   Dressing Status Clean;Dry; Intact 09/13/23 1033       Wound 03/22/23 Thigh Left;Lateral (Active)       Wound 03/22/23 MASD Breast Left;Upper (Active)   Wound Image   09/11/23 1549   Wound Description Beefy red;Dry 09/15/23 1400   Alison-wound Assessment Fragile 09/14/23 0803   Wound Length (cm) 1.5 cm 09/11/23 1549   Wound Width (cm) 7 cm 09/11/23 1549   Wound Depth (cm) 0.1 cm 09/11/23 1549   Wound Surface Area (cm^2) 10.5 cm^2 09/11/23 1549   Wound Volume (cm^3) 1.05 cm^3 09/11/23 1549   Calculated Wound Volume (cm^3) 1.05 cm^3 09/11/23 1549   Change in Wound Size % -320 09/11/23 1549   Wound Site Closure Open to air 09/12/23 1500   Drainage Amount Small 09/13/23 1033   Drainage Description Serosanguineous 09/13/23 1033   Treatments Cleansed 09/14/23 7454   Dressing ABD 09/14/23 0803   Wound packed? No 09/09/23 1507   Dressing Changed Changed 09/14/23 0803   Patient Tolerance Tolerated well 09/13/23 1033   Dressing Status Clean;Dry; Intact 09/13/23 1033       Wound 03/22/23 Abdomen Right;Lateral (Active)   Wound Description Beefy red;Dry 09/15/23 1400   Alison-wound Assessment Pink;Rash 09/12/23 1500   Wound Site Closure Open to air 09/12/23 1500   Drainage Amount Small 09/14/23 0803   Drainage Description Serous 09/14/23 0803   Non-staged Wound Description Partial thickness 09/13/23 1033   Treatments Cleansed 09/14/23 0803   Dressing ABD 09/14/23 0803   Wound packed? No 09/09/23 1507   Dressing Changed Changed 09/14/23 0803   Patient Tolerance Tolerated well 09/13/23 1033   Dressing Status Clean;Dry; Intact 09/13/23 1033       Wound 08/05/23 MASD Hip Right;Posterior (Active)       Wound 08/05/23 MASD Pelvis Anterior; Left (Active)   Wound Description Beefy red;Dry 09/15/23 1400   Alison-wound Assessment Maceration 09/14/23 0803   Drainage Amount Small 09/14/23 0803   Drainage Description Bloody 09/14/23 0803   Treatments Cleansed 09/14/23 0803   Dressing ABD 09/14/23 0803   Dressing Changed Changed 09/14/23 0803       Wound 08/05/23 MASD Abdomen Medial;Lower (Active)   Wound Image   09/11/23 1559   Wound Description Beefy red;Dry;Fragile 09/15/23 1400   Alison-wound Assessment Erythema 09/14/23 0803   Wound Length (cm) 0.3 cm 09/11/23 1559   Wound Width (cm) 9 cm 09/11/23 1559   Wound Depth (cm) 0.1 cm 09/11/23 1559   Wound Surface Area (cm^2) 2.7 cm^2 09/11/23 1559   Wound Volume (cm^3) 0.27 cm^3 09/11/23 1559   Calculated Wound Volume (cm^3) 0.27 cm^3 09/11/23 1559   Change in Wound Size % 74.29 09/11/23 1559   Drainage Amount None 09/14/23 0803   Non-staged Wound Description Partial thickness 09/13/23 1033   Treatments Cleansed 09/14/23 0803   Dressing ABD 09/14/23 0803   Dressing Changed Changed 09/14/23 0803   Patient Tolerance Tolerated well 09/13/23 1033 Dressing Status Clean;Dry; Intact 09/13/23 1033       Wound 08/05/23 MASD Lymphedema Hip Right (Active)   Wound Image   09/11/23 1557   Wound Description Beefy red;Fragile 09/15/23 1400   Alison-wound Assessment Edema; Erythema;Fragile 09/14/23 0803   Wound Length (cm) 1 cm 09/11/23 1557   Wound Width (cm) 6 cm 09/11/23 1557   Wound Depth (cm) 0.1 cm 09/11/23 1557   Wound Surface Area (cm^2) 6 cm^2 09/11/23 1557   Wound Volume (cm^3) 0.6 cm^3 09/11/23 1557   Calculated Wound Volume (cm^3) 0.6 cm^3 09/11/23 1557   Drainage Amount Small 09/15/23 1400   Drainage Description Serous 09/15/23 1400   Non-staged Wound Description Partial thickness 09/13/23 1033   Treatments Cleansed 09/14/23 0803   Dressing ABD 09/14/23 0803   Dressing Changed Changed 09/14/23 0803   Patient Tolerance Tolerated well 09/15/23 1400   Dressing Status Clean;Dry; Intact 09/13/23 1033       Wound 08/05/23 Other (comment) Other (Comment) Back Right (Active)   Wound Description Beefy red;Dry 09/15/23 1400   Alison-wound Assessment Scaly 09/15/23 1400   Wound Site Closure Open to air 09/14/23 0803   Drainage Amount Small 09/13/23 1033   Drainage Description Serous 09/13/23 1033   Treatments Cleansed 09/14/23 0803   Dressing Protective barrier 09/15/23 1400   Wound packed? No 09/09/23 1507   Dressing Changed Changed 09/13/23 1033   Patient Tolerance Tolerated well 09/15/23 1400   Dressing Status Clean;Dry; Intact 09/13/23 1033       Wound 08/07/23 Breast Right; Lower (Active)   Wound Image   09/11/23 1555   Wound Description Beefy red;Dry;Fragile 09/15/23 1400   Alison-wound Assessment Dry;Clean; Intact 09/12/23 1500   Wound Length (cm) 0.3 cm 09/11/23 1555   Wound Width (cm) 11 cm 09/11/23 1555   Wound Depth (cm) 0.1 cm 09/11/23 1555   Wound Surface Area (cm^2) 3.3 cm^2 09/11/23 1555   Wound Volume (cm^3) 0.33 cm^3 09/11/23 1555   Calculated Wound Volume (cm^3) 0.33 cm^3 09/11/23 1555   Change in Wound Size % 51.47 09/11/23 1555   Drainage Amount None 09/13/23 1033   Non-staged Wound Description Partial thickness 09/13/23 1033   Treatments Cleansed 09/14/23 0803   Dressing ABD 09/14/23 0803   Dressing Changed Changed 09/14/23 0803   Patient Tolerance Tolerated well 09/13/23 1033   Dressing Status Clean;Dry; Intact 09/13/23 1033       Wound 08/21/23 MASD Abdomen Medial;Right (Active)   Wound Image   09/11/23 1600   Wound Description Beefy red;Dry 09/15/23 1400   Alison-wound Assessment Erythema; Maceration 09/14/23 0803   Wound Length (cm) 3 cm 09/11/23 1600   Wound Width (cm) 9 cm 09/11/23 1600   Wound Depth (cm) 0.1 cm 09/11/23 1600   Wound Surface Area (cm^2) 27 cm^2 09/11/23 1600   Wound Volume (cm^3) 2.7 cm^3 09/11/23 1600   Calculated Wound Volume (cm^3) 2.7 cm^3 09/11/23 1600   Drainage Amount Small 09/14/23 0803   Drainage Description Serous 09/14/23 0803   Non-staged Wound Description Partial thickness 09/13/23 1033   Treatments Cleansed 09/14/23 0803   Dressing ABD 09/14/23 0803   Dressing Changed Changed 09/14/23 0803   Patient Tolerance Tolerated well 09/13/23 1033   Dressing Status Clean;Dry; Intact 09/13/23 1033     Reviewed plan of care with primary RN Valerio  Recommendations written as orders  Wound care team to follow weekly while admitted  Questions or concerns [id number] [facility] BSN, RN, [facility]

## 2023-09-15 NOTE — NURSING NOTE
RN informed pt that IV was due to be changed. Pt refused x 3.  RN educated pt on possible negative effect of keep IV. Pt still declined.

## 2023-09-15 NOTE — PROGRESS NOTES
Progress Note - [facility] Infectious Disease   Montez White 68 y.o. female MRN: [id number]  Unit/Bed#: -01 Encounter: [id number]    IMPRESSION & RECOMMENDATIONS:   1. Chronic lymphedema of the pannus. Poorly controlled. Patient with recent admissions for recurrent cellulitis and panniculitis despite several courses of antibiotics. Prior wound cultures showed growth of pseudomonas though she improved without coverage for this. She has no leukocytosis and remains afebrile. Exam findings seem most consistent with lymphedema. Consider possibility of a volume issue given her chronic dependent, bed bound status. She also has chronic weeping wounds which I suspect continue to irritate the skin. Patient reports improvement in the past with IV lasix though it is difficult to assess her volume status given morbid obesity.   -continue to monitor off antibiotics  -continue as needed diuresis   -serial skin exams and aggressive local wound care   -may need to consider long term placement as patient is unable to manage edema and wound care at home      2. Mild leukocytosis. She remains afebrile. Blood cultures are negative to date.  -continue to follow up blood cultures   -monitor CBCd, temperature, and hemodynamics       3. Resolving right back, buttock cellulitis and right-sided panniculitis. Completed course of PO Linezolid after recent admission. No new infectious findings appreciated on exam.     4. Morbid obesity. Patient with BMI of 74.  Unfortunately this limits antibiotic delivery and makes dosing of abx also difficult.     5. Antibiotic allergies. Noted with severe allergies to penicillins and lower generation cephalosporins. Has tolerated cefepime. Unfortunately this limits antibiotic options.     6. History of C. Difficile. No active diarrhea currently. Monitor abdominal exam and stool output otherwise. Infectious disease consultation service will sign off for now.   Please call if any new signs or symptoms of infection develop. Thank you! Antibiotics:  Off abx     I have discussed the above management plan in detail with patient. I have discussed the above management plan in detail with patient's RN, and the primary service, [facility].     24 Hour events:  Yesterday and overnight notes reviewed. Continues on PRN diuresis. Subjective:  Patient is sleeping. Denies fever, chills, sweats; no nausea, vomiting, diarrhea; no cough, shortness of breath; no pain. No new symptoms. Objective:  Vitals:  Temp:  [97.6 °F (36.4 °C)-97.9 °F (36.6 °C)] 97.6 °F (36.4 °C)  HR:  [63-70] 65  Resp:  [18-20] 19  BP: (108-115)/(50-63) 113/50  SpO2:  [94 %-98 %] 98 %  Temp (24hrs), Av.7 °F (36.5 °C), Min:97.6 °F (36.4 °C), Max:97.9 °F (36.6 °C)  Current: Temperature: 97.6 °F (36.4 °C)    PHYSICAL EXAM:  General Appearance:  Appearing chronically ill and debilitated, nontoxic, and in no distress; sleeping    HEENT: Normocephalic, without obvious abnormality, atraumatic. Conjunctiva pink and sclera anicteric. Oropharynx moist without lesions. Lungs:   Clear to auscultation bilaterally, respirations unlabored   Heart:  RRR; no murmur, rub or gallop   Abdomen:   Soft, non-tender, morbidly obese, non-distended, positive bowel sounds    Extremities: No cyanosis, clubbing or edema   Musculoskeletal: Back symmetric without curvature, ROM normal.    Skin: No rashes or lesions. No draining wounds noted. Large pannus without erythema warmth or current weeping. Peripheral IV intact without evidence of erythema, warmth, or exudate. LABS, IMAGING, & OTHER STUDIES:  Extensive review of the medical records in epic including review of the notes, radiographs, and laboratory results were reviewed personally as below. Lab Results:  I have personally reviewed pertinent labs.   Results from last 7 days   Lab Units 09/15/23  0630 23  0607 23  0611   WBC Thousand/uL 12.18* 9.34 9.84   HEMOGLOBIN g/dL 10.0* 10.1* 10.0* PLATELETS Thousands/uL 576* 574* 593*     Results from last 7 days   Lab Units 09/15/23  0630 09/14/23  0607 09/13/23  0611 09/10/23  0616 09/09/23  1325   SODIUM mmol/L 138 138 138   < > 135   POTASSIUM mmol/L 3.7 4.3 4.1   < > 3.5   CHLORIDE mmol/L 101 101 101   < > 98   CO2 mmol/L 31 32 31   < > 32   BUN mg/dL 25 25 24   < > 24   CREATININE mg/dL 1.15 1.23 1.11   < > 1.14   EGFR ml/min/1.73sq m 47 43 49   < > 48   CALCIUM mg/dL 8.5 8.3* 8.0*   < > 7.9*   AST U/L  --   --   --   --  29   ALT U/L  --   --   --   --  19   ALK PHOS U/L  --   --   --   --  74    < > = values in this interval not displayed. Results from last 7 days   Lab Units 09/09/23  1331 09/09/23  1325   BLOOD CULTURE  No Growth After 5 Days. No Growth After 5 Days. Results from last 7 days   Lab Units 09/09/23  1325   PROCALCITONIN ng/ml 0.31*                   Imaging Studies:   I have personally reviewed pertinent imaging study reports and images in PACS. Other Studies:   I have personally reviewed pertinent report including blood cx.

## 2023-09-15 NOTE — PLAN OF CARE
Problem: MOBILITY - ADULT  Goal: Maintain or return to baseline ADL function  Description: INTERVENTIONS:  -  Assess patient's ability to carry out ADLs; assess patient's baseline for ADL function and identify physical deficits which impact ability to perform ADLs (bathing, care of mouth/teeth, toileting, grooming, dressing, etc.)  - Assess/evaluate cause of self-care deficits   - Assess range of motion  - Assess patient's mobility; develop plan if impaired  - Assess patient's need for assistive devices and provide as appropriate  - Encourage maximum independence but intervene and supervise when necessary  - Involve family in performance of ADLs  - Assess for home care needs following discharge   - Consider OT consult to assist with ADL evaluation and planning for discharge  - Provide patient education as appropriate  Outcome: Progressing  Goal: Maintains/Returns to pre admission functional level  Description: INTERVENTIONS:  - Perform BMAT or MOVE assessment daily.   - Set and communicate daily mobility goal to care team and patient/family/caregiver. - Collaborate with rehabilitation services on mobility goals if consulted  - Perform Range of Motion 3 times a day. - Reposition patient every 2 hours.   - Dangle patient 3 times a day  - Stand patient 3 times a day  - Ambulate patient 3 times a day  - Out of bed to chair 3 times a day   - Out of bed for meals 3 times a day  - Out of bed for toileting  - Record patient progress and toleration of activity level   Outcome: Progressing     Problem: PAIN - ADULT  Goal: Verbalizes/displays adequate comfort level or baseline comfort level  Description: Interventions:  - Encourage patient to monitor pain and request assistance  - Assess pain using appropriate pain scale  - Administer analgesics based on type and severity of pain and evaluate response  - Implement non-pharmacological measures as appropriate and evaluate response  - Consider cultural and social influences on pain and pain management  - Notify physician/advanced practitioner if interventions unsuccessful or patient reports new pain  Outcome: Progressing     Problem: INFECTION - ADULT  Goal: Absence or prevention of progression during hospitalization  Description: INTERVENTIONS:  - Assess and monitor for signs and symptoms of infection  - Monitor lab/diagnostic results  - Monitor all insertion sites, i.e. indwelling lines, tubes, and drains  - Monitor endotracheal if appropriate and nasal secretions for changes in amount and color  - [place] appropriate cooling/warming therapies per order  - Administer medications as ordered  - Instruct and encourage patient and family to use good hand hygiene technique  - Identify and instruct in appropriate isolation precautions for identified infection/condition  Outcome: Progressing  Goal: Absence of fever/infection during neutropenic period  Description: INTERVENTIONS:  - Monitor WBC    Outcome: Progressing     Problem: SAFETY ADULT  Goal: Maintain or return to baseline ADL function  Description: INTERVENTIONS:  -  Assess patient's ability to carry out ADLs; assess patient's baseline for ADL function and identify physical deficits which impact ability to perform ADLs (bathing, care of mouth/teeth, toileting, grooming, dressing, etc.)  - Assess/evaluate cause of self-care deficits   - Assess range of motion  - Assess patient's mobility; develop plan if impaired  - Assess patient's need for assistive devices and provide as appropriate  - Encourage maximum independence but intervene and supervise when necessary  - Involve family in performance of ADLs  - Assess for home care needs following discharge   - Consider OT consult to assist with ADL evaluation and planning for discharge  - Provide patient education as appropriate  Outcome: Progressing  Goal: Maintains/Returns to pre admission functional level  Description: INTERVENTIONS:  - Perform BMAT or MOVE assessment daily.   - Set and communicate daily mobility goal to care team and patient/family/caregiver. - Collaborate with rehabilitation services on mobility goals if consulted  - Perform Range of Motion 3 times a day. - Reposition patient every 2 hours.   - Dangle patient 3 times a day  - Stand patient 3 times a day  - Ambulate patient 3 times a day  - Out of bed to chair 3 times a day   - Out of bed for meals 3 times a day  - Out of bed for toileting  - Record patient progress and toleration of activity level   Outcome: Progressing  Goal: Patient will remain free of falls  Description: INTERVENTIONS:  - Educate patient/family on patient safety including physical limitations  - Instruct patient to call for assistance with activity   - Consult OT/PT to assist with strengthening/mobility   - Keep Call bell within reach  - Keep bed low and locked with side rails adjusted as appropriate  - Keep care items and personal belongings within reach  - Initiate and maintain comfort rounds  - Make Fall Risk Sign visible to staff  - Offer Toileting every 2 Hours, in advance of need  - Initiate/Maintain bed alarm  - Obtain necessary fall risk management equipment:  bed alarm  - Apply yellow socks and bracelet for high fall risk patients  - Consider moving patient to room near nurses station  Outcome: Progressing     Problem: DISCHARGE PLANNING  Goal: Discharge to home or other facility with appropriate resources  Description: INTERVENTIONS:  - Identify barriers to discharge w/patient and caregiver  - Arrange for needed discharge resources and transportation as appropriate  - Identify discharge learning needs (meds, wound care, etc.)  - Arrange for interpretive services to assist at discharge as needed  - Refer to Case Management Department for coordinating discharge planning if the patient needs post-hospital services based on physician/advanced practitioner order or complex needs related to functional status, cognitive ability, or social support system  Outcome: Progressing     Problem: Knowledge Deficit  Goal: Patient/family/caregiver demonstrates understanding of disease process, treatment plan, medications, and discharge instructions  Description: Complete learning assessment and assess knowledge base.   Interventions:  - Provide teaching at level of understanding  - Provide teaching via preferred learning methods  Outcome: Progressing     Problem: METABOLIC, FLUID AND ELECTROLYTES - ADULT  Goal: Electrolytes maintained within normal limits  Description: INTERVENTIONS:  - Monitor labs and assess patient for signs and symptoms of electrolyte imbalances  - Administer electrolyte replacement as ordered  - Monitor response to electrolyte replacements, including repeat lab results as appropriate  - Instruct patient on fluid and nutrition as appropriate  Outcome: Progressing  Goal: Fluid balance maintained  Description: INTERVENTIONS:  - Monitor labs   - Monitor I/O and WT  - Instruct patient on fluid and nutrition as appropriate  - Assess for signs & symptoms of volume excess or deficit  Outcome: Progressing     Problem: SKIN/TISSUE INTEGRITY - ADULT  Goal: Skin Integrity remains intact(Skin Breakdown Prevention)  Description: Assess:  -Perform [place] assessment every shift   -Clean and moisturize skin every  day  -Inspect skin when repositioning, toileting, and assisting with ADLS    Bed Management:  -Have minimal linens on bed & keep smooth, unwrinkled  -Change linens as needed when moist or perspiring  -Avoid sitting or lying in one position for more than 2 hours while in bed  -Keep HOB at 30 degrees     Toileting:  -Offer bedside commode  -Assess for incontinence every interation   -Use incontinent care products after each incontinent episode such as wipes briefs    Activity:  -Mobilize patient 3 times a day  -Encourage activity and walks on unit  -Encourage or provide ROM exercises   -Turn and reposition patient every 2 Hours  -Use appropriate equipment to lift or move patient in bed    Skin Care:  -Avoid use of baby powder, tape, friction and shearing, hot water or constrictive clothing  -Relieve pressure over bony prominences using pillows  -Do not massage red bony areas    Outcome: Progressing  Goal: Incision(s), wounds(s) or drain site(s) healing without S/S of infection  Description: INTERVENTIONS  - Assess and document dressing, incision, wound bed, drain sites and surrounding tissue  - Provide patient and family education  - Perform skin care/dressing changes every interaction   Outcome: Progressing     Problem: Prexisting or High Potential for Compromised Skin Integrity  Goal: Skin integrity is maintained or improved  Description: INTERVENTIONS:  - Identify patients at risk for skin breakdown  - Assess and monitor skin integrity  - Assess and monitor nutrition and hydration status  - Monitor labs   - Assess for incontinence   - Turn and reposition patient  - Assist with mobility/ambulation  - Relieve pressure over bony prominences  - Avoid friction and shearing  - Provide appropriate hygiene as needed including keeping skin clean and dry  - Evaluate need for skin moisturizer/barrier cream  - Collaborate with interdisciplinary team   - Patient/family teaching  - Consider wound care consult   Outcome: Progressing     Problem: Nutrition/Hydration-ADULT  Goal: Nutrient/Hydration intake appropriate for improving, restoring or maintaining nutritional needs  Description: Monitor and assess patient's nutrition/hydration status for malnutrition. Collaborate with interdisciplinary team and initiate plan and interventions as ordered. Monitor patient's weight and dietary intake as ordered or per policy. Utilize nutrition screening tool and intervene as necessary. Determine patient's food preferences and provide high-protein, high-caloric foods as appropriate.      INTERVENTIONS:  - Monitor oral intake, urinary output, labs, and treatment plans  - Assess nutrition and hydration status and recommend course of action  - Evaluate amount of meals eaten  - Assist patient with eating if necessary   - Allow adequate time for meals  - Recommend/ encourage appropriate diets, oral nutritional supplements, and vitamin/mineral supplements  - Order, calculate, and assess calorie counts as needed  - Recommend, monitor, and adjust tube feedings and TPN/PPN based on assessed needs  - Assess need for intravenous fluids  - Provide specific nutrition/hydration education as appropriate  - Include patient/family/caregiver in decisions related to nutrition  Outcome: Progressing

## 2023-09-15 NOTE — PROGRESS NOTES
[id number] [facility]  Progress Note  Name: Gianni Gambino  MRN: [id number]  Unit/Bed#: MS Jose Manuel01 I Date of Admission: 9/9/2023   Date of Service: 9/15/2023 I Hospital Day: 6    Assessment/Plan   * Cellulitis- Ruled Out  Assessment & Plan  · Findings more concerning for chronic/ poorly controlled lymphedema of the pannus  · Afebrile and without leukocytosis  · Will give IV diuresis while hospitalized to better control her edema  · ID evaluation appreciated  · Will continue to monitor off abx therapy     Chronic atrial fibrillation (HCC)  Assessment & Plan  · Rate-controlled without AV kiko blocking agents  · Continue Warfarin 2mg SMWFS and 4mg TT    Chronic diastolic congestive heart failure ([address])  Assessment & Plan  · Continue home Midodrine 2.5mg TID  · Patient requesting Lasix be changed to IV while inpatient to aide in her lymphedema  · Will continue careful monitoring of her fluid status and renal function       Left leg pain  Assessment & Plan  · Patient reporting left leg pain/tightness. · Venous Duplex (9/12): No evidence of acute or chronic deep vein thrombosis. No evidence of superficial thrombophlebitis noted. · Patient has received IV diuresis as above for chronic lymphedema- will continue    Class 3 severe obesity due to excess calories with serious comorbidity and body mass index (BMI) greater than or equal to 70 in adult [facility])  Assessment & Plan  · BMI of 74.55  · Encouraged lifestyle modifications and weight loss  · Calorie restricted diet            VTE Pharmacologic Prophylaxis: VTE Score: 5 High Risk (Score >/= 5) - Pharmacological DVT Prophylaxis Ordered: warfarin (Coumadin). Sequential Compression Devices Ordered. Patient Centered Rounds: I performed bedside rounds with nursing staff today. Discussions with Specialists or Other Care Team Provider: nursing, CM    Education and Discussions with Family / Patient: Patient declined call to .      Total Time Spent on Date of Encounter in care of patient: 25 mins. This time was spent on one or more of the following: performing physical exam; counseling and coordination of care; obtaining or reviewing history; documenting in the medical record; reviewing/ordering tests, medications or procedures; communicating with other healthcare professionals and discussing with patient's family/caregivers. Current Length of Stay: 6 day(s)  Current Patient Status: Inpatient   Certification Statement: The patient will continue to require additional inpatient hospital stay due to patient's caregiver not available until 23  Discharge Plan: The patient is medially clear for discharge however patient is bedbound and her caregiver is not able to take care of her until 23 due to her caregiver having a shoulder injury per patient    Code Status: Level 1 - Full Code    Subjective: The patient was seen and examined. The patient is lying in bed in no acute distress. She denies any complaints. Objective:     Vitals:   Temp (24hrs), Av.7 °F (36.5 °C), Min:97.6 °F (36.4 °C), Max:97.9 °F (36.6 °C)    Temp:  [97.6 °F (36.4 °C)-97.9 °F (36.6 °C)] 97.6 °F (36.4 °C)  HR:  [64-70] 65  Resp:  [18-20] 19  BP: (108-115)/(50-63) 113/50  SpO2:  [94 %-98 %] 98 %  Body mass index is 73.34 kg/m². Input and Output Summary (last 24 hours): Intake/Output Summary (Last 24 hours) at 9/15/2023 1344  Last data filed at 9/15/2023 0707  Gross per 24 hour   Intake --   Output 1300 ml   Net -1300 ml       Physical Exam:   Physical Exam  Vitals and nursing note reviewed. Constitutional:       General: She is awake. Appearance: She is morbidly obese. HENT:      Head: Normocephalic and atraumatic. Cardiovascular:      Rate and Rhythm: Normal rate and regular rhythm. Heart sounds: Normal heart sounds. Pulmonary:      Effort: Pulmonary effort is normal.      Breath sounds: Normal breath sounds.    Abdominal:      Palpations: Abdomen is soft. Tenderness: There is no abdominal tenderness. Skin:     General: Skin is warm and dry. Neurological:      General: No focal deficit present. Mental Status: She is alert and oriented to person, place, and time. Psychiatric:         Attention and Perception: Attention normal.         Mood and Affect: Mood normal.         Speech: Speech normal.         Behavior: Behavior is cooperative. Additional Data:     Labs:  Results from last 7 days   Lab Units 09/15/23  0630 09/12/23  0550 09/11/23  0536   WBC Thousand/uL 12.18*   < > 12.79*   HEMOGLOBIN g/dL 10.0*   < > 9.8*   HEMATOCRIT % 35.0   < > 35.5   PLATELETS Thousands/uL 576*   < > 605*   NEUTROS PCT %  --   --  80*   LYMPHS PCT %  --   --  7*   MONOS PCT %  --   --  5   EOS PCT %  --   --  5    < > = values in this interval not displayed. Results from last 7 days   Lab Units 09/15/23  0630 09/10/23  0616 09/09/23  1325   SODIUM mmol/L 138   < > 135   POTASSIUM mmol/L 3.7   < > 3.5   CHLORIDE mmol/L 101   < > 98   CO2 mmol/L 31   < > 32   BUN mg/dL 25   < > 24   CREATININE mg/dL 1.15   < > 1.14   ANION GAP mmol/L 6   < > 5   CALCIUM mg/dL 8.5   < > 7.9*   ALBUMIN g/dL  --   --  2.5*   TOTAL BILIRUBIN mg/dL  --   --  0.50   ALK PHOS U/L  --   --  74   ALT U/L  --   --  19   AST U/L  --   --  29   GLUCOSE RANDOM mg/dL 99   < > 108    < > = values in this interval not displayed. Results from last 7 days   Lab Units 09/14/23  0607   INR  2.66*             Results from last 7 days   Lab Units 09/09/23  1325   LACTIC ACID mmol/L 1.1   PROCALCITONIN ng/ml 0.31*       Lines/Drains:  Invasive Devices     Peripheral Intravenous Line  Duration           Peripheral IV 09/09/23 Right Antecubital 5 days          Drain  Duration           External Urinary Catheter 2 days                      Imaging: No pertinent imaging reviewed.     Recent Cultures (last 7 days):   Results from last 7 days   Lab Units 09/09/23  1331 09/09/23  1325 BLOOD CULTURE  No Growth After 5 Days. No Growth After 5 Days. Last 24 Hours Medication List:   Current Facility-Administered Medications   Medication Dose Route Frequency Provider Last Rate   • acetaminophen  650 mg Oral Q4H PRN Floemmaie Boncarbo, DO     • allopurinol  100 mg Oral Daily Flonnie Boncarbo, DO     • docusate sodium  100 mg Oral Daily Flonnie Boncarbo, DO     • DULoxetine  20 mg Oral Daily Floemmaie Boncarbo, DO     • gabapentin  100 mg Oral TID Floflaca Gardnerm, DO     • levothyroxine  125 mcg Oral Early Morning Floemmaie Boncarbo, DO     • midodrine  2.5 mg Oral TID AC Floflaca Boncarbo, DO     • nystatin   Topical BID Floflaca Gardnerm, DO     • ondansetron  4 mg Intravenous Q6H PRN Luis Gardnerm, DO     • warfarin  2 mg Oral Once per day on Sun Mon Wed Fri Sat Luis Maddox, DO     • warfarin  4 mg Oral Once per day on Tue Thu Thiago Murdock DO          Today, Patient Was Seen By: Sylvester Dunlap PA-C    **Please Note: This note may have been constructed using a voice recognition system. **

## 2023-09-16 LAB
ANION GAP SERPL CALCULATED.3IONS-SCNC: 7 MMOL/L
BUN SERPL-MCNC: 27 MG/DL (ref 5–25)
CALCIUM SERPL-MCNC: 8.4 MG/DL (ref 8.4–10.2)
CHLORIDE SERPL-SCNC: 100 MMOL/L (ref 96–108)
CO2 SERPL-SCNC: 31 MMOL/L (ref 21–32)
CREAT SERPL-MCNC: 1.17 MG/DL (ref 0.6–1.3)
ERYTHROCYTE [DISTWIDTH] IN BLOOD BY AUTOMATED COUNT: 18.3 % (ref 11.6–15.1)
GFR SERPL CREATININE-BSD FRML MDRD: 46 ML/MIN/1.73SQ M
GLUCOSE SERPL-MCNC: 110 MG/DL (ref 65–140)
HCT VFR BLD AUTO: 35.6 % (ref 34.8–46.1)
HGB BLD-MCNC: 10 G/DL (ref 11.5–15.4)
MCH RBC QN AUTO: 23.9 PG (ref 26.8–34.3)
MCHC RBC AUTO-ENTMCNC: 28.1 G/DL (ref 31.4–37.4)
MCV RBC AUTO: 85 FL (ref 82–98)
PLATELET # BLD AUTO: 557 THOUSANDS/UL (ref 149–390)
PMV BLD AUTO: 8.2 FL (ref 8.9–12.7)
POTASSIUM SERPL-SCNC: 3.8 MMOL/L (ref 3.5–5.3)
RBC # BLD AUTO: 4.19 MILLION/UL (ref 3.81–5.12)
SODIUM SERPL-SCNC: 138 MMOL/L (ref 135–147)
WBC # BLD AUTO: 14.27 THOUSAND/UL (ref 4.31–10.16)

## 2023-09-16 PROCEDURE — 80048 BASIC METABOLIC PNL TOTAL CA: CPT | Performed by: PHYSICIAN ASSISTANT

## 2023-09-16 PROCEDURE — 85027 COMPLETE CBC AUTOMATED: CPT | Performed by: PHYSICIAN ASSISTANT

## 2023-09-16 PROCEDURE — 99232 SBSQ HOSP IP/OBS MODERATE 35: CPT | Performed by: PHYSICIAN ASSISTANT

## 2023-09-16 RX ORDER — FUROSEMIDE 10 MG/ML
40 INJECTION INTRAMUSCULAR; INTRAVENOUS ONCE
Status: COMPLETED | OUTPATIENT
Start: 2023-09-16 | End: 2023-09-16

## 2023-09-16 RX ADMIN — NYSTATIN: 100000 POWDER TOPICAL at 08:09

## 2023-09-16 RX ADMIN — GABAPENTIN 100 MG: 100 CAPSULE ORAL at 08:08

## 2023-09-16 RX ADMIN — GABAPENTIN 100 MG: 100 CAPSULE ORAL at 17:09

## 2023-09-16 RX ADMIN — WARFARIN SODIUM 2 MG: 2 TABLET ORAL at 17:09

## 2023-09-16 RX ADMIN — MIDODRINE HYDROCHLORIDE 2.5 MG: 5 TABLET ORAL at 10:28

## 2023-09-16 RX ADMIN — LEVOTHYROXINE SODIUM 125 MCG: 25 TABLET ORAL at 05:40

## 2023-09-16 RX ADMIN — DULOXETINE HYDROCHLORIDE 20 MG: 20 CAPSULE, DELAYED RELEASE ORAL at 08:08

## 2023-09-16 RX ADMIN — NYSTATIN: 100000 POWDER TOPICAL at 17:37

## 2023-09-16 RX ADMIN — FUROSEMIDE 40 MG: 10 INJECTION, SOLUTION INTRAMUSCULAR; INTRAVENOUS at 10:29

## 2023-09-16 RX ADMIN — GABAPENTIN 100 MG: 100 CAPSULE ORAL at 22:37

## 2023-09-16 RX ADMIN — MIDODRINE HYDROCHLORIDE 2.5 MG: 5 TABLET ORAL at 06:10

## 2023-09-16 RX ADMIN — ALLOPURINOL 100 MG: 100 TABLET ORAL at 08:08

## 2023-09-16 RX ADMIN — MIDODRINE HYDROCHLORIDE 2.5 MG: 5 TABLET ORAL at 17:09

## 2023-09-16 NOTE — PLAN OF CARE
Problem: INFECTION - ADULT  Goal: Absence or prevention of progression during hospitalization  Description: INTERVENTIONS:  - Assess and monitor for signs and symptoms of infection  - Monitor lab/diagnostic results  - Monitor all insertion sites, i.e. indwelling lines, tubes, and drains  - Monitor endotracheal if appropriate and nasal secretions for changes in amount and color  - [place] appropriate cooling/warming therapies per order  - Administer medications as ordered  - Instruct and encourage patient and family to use good hand hygiene technique  - Identify and instruct in appropriate isolation precautions for identified infection/condition  9/16/2023 0938 by Layne Kimball RN  Outcome: Progressing  9/16/2023 0936 by Layne Kimball RN  Outcome: Progressing  Goal: Absence of fever/infection during neutropenic period  Description: INTERVENTIONS:  - Monitor WBC    9/16/2023 0938 by Layne Kimball RN  Outcome: Progressing  9/16/2023 0936 by Layne Kimball RN  Outcome: Progressing     Problem: PAIN - ADULT  Goal: Verbalizes/displays adequate comfort level or baseline comfort level  Description: Interventions:  - Encourage patient to monitor pain and request assistance  - Assess pain using appropriate pain scale  - Administer analgesics based on type and severity of pain and evaluate response  - Implement non-pharmacological measures as appropriate and evaluate response  - Consider cultural and social influences on pain and pain management  - Notify physician/advanced practitioner if interventions unsuccessful or patient reports new pain  9/16/2023 0938 by Layne Kimball RN  Outcome: Progressing  9/16/2023 0936 by Layne Kimball RN  Outcome: Progressing

## 2023-09-16 NOTE — PLAN OF CARE
Problem: MOBILITY - ADULT  Goal: Maintain or return to baseline ADL function  Description: INTERVENTIONS:  -  Assess patient's ability to carry out ADLs; assess patient's baseline for ADL function and identify physical deficits which impact ability to perform ADLs (bathing, care of mouth/teeth, toileting, grooming, dressing, etc.)  - Assess/evaluate cause of self-care deficits   - Assess range of motion  - Assess patient's mobility; develop plan if impaired  - Assess patient's need for assistive devices and provide as appropriate  - Encourage maximum independence but intervene and supervise when necessary  - Involve family in performance of ADLs  - Assess for home care needs following discharge   - Consider OT consult to assist with ADL evaluation and planning for discharge  - Provide patient education as appropriate  Outcome: Progressing  Goal: Maintains/Returns to pre admission functional level  Description: INTERVENTIONS:  - Perform BMAT or MOVE assessment daily.   - Set and communicate daily mobility goal to care team and patient/family/caregiver. - Collaborate with rehabilitation services on mobility goals if consulted  - Perform Range of Motion  times a day. - Reposition patient every  hours.   - Dangle patient  times a day  - Stand patient  times a day  - Ambulate patient  times a day  - Out of bed to chair  times a day   - Out of bed for meals  times a day  - Out of bed for toileting  - Record patient progress and toleration of activity level   Outcome: Progressing     Problem: PAIN - ADULT  Goal: Verbalizes/displays adequate comfort level or baseline comfort level  Description: Interventions:  - Encourage patient to monitor pain and request assistance  - Assess pain using appropriate pain scale  - Administer analgesics based on type and severity of pain and evaluate response  - Implement non-pharmacological measures as appropriate and evaluate response  - Consider cultural and social influences on pain and pain management  - Notify physician/advanced practitioner if interventions unsuccessful or patient reports new pain  Outcome: Progressing     Problem: INFECTION - ADULT  Goal: Absence or prevention of progression during hospitalization  Description: INTERVENTIONS:  - Assess and monitor for signs and symptoms of infection  - Monitor lab/diagnostic results  - Monitor all insertion sites, i.e. indwelling lines, tubes, and drains  - Monitor endotracheal if appropriate and nasal secretions for changes in amount and color  - [place] appropriate cooling/warming therapies per order  - Administer medications as ordered  - Instruct and encourage patient and family to use good hand hygiene technique  - Identify and instruct in appropriate isolation precautions for identified infection/condition  Outcome: Progressing  Goal: Absence of fever/infection during neutropenic period  Description: INTERVENTIONS:  - Monitor WBC    Outcome: Progressing     Problem: SAFETY ADULT  Goal: Maintain or return to baseline ADL function  Description: INTERVENTIONS:  -  Assess patient's ability to carry out ADLs; assess patient's baseline for ADL function and identify physical deficits which impact ability to perform ADLs (bathing, care of mouth/teeth, toileting, grooming, dressing, etc.)  - Assess/evaluate cause of self-care deficits   - Assess range of motion  - Assess patient's mobility; develop plan if impaired  - Assess patient's need for assistive devices and provide as appropriate  - Encourage maximum independence but intervene and supervise when necessary  - Involve family in performance of ADLs  - Assess for home care needs following discharge   - Consider OT consult to assist with ADL evaluation and planning for discharge  - Provide patient education as appropriate  Outcome: Progressing  Goal: Maintains/Returns to pre admission functional level  Description: INTERVENTIONS:  - Perform BMAT or MOVE assessment daily.   - Set and communicate daily mobility goal to care team and patient/family/caregiver. - Collaborate with rehabilitation services on mobility goals if consulted  - Perform Range of Motion  times a day. - Reposition patient every  hours.   - Dangle patient  times a day  - Stand patient  times a day  - Ambulate patient  times a day  - Out of bed to chair  times a day   - Out of bed for meals  times a day  - Out of bed for toileting  - Record patient progress and toleration of activity level   Outcome: Progressing  Goal: Patient will remain free of falls  Description: INTERVENTIONS:  - Educate patient/family on patient safety including physical limitations  - Instruct patient to call for assistance with activity   - Consult OT/PT to assist with strengthening/mobility   - Keep Call bell within reach  - Keep bed low and locked with side rails adjusted as appropriate  - Keep care items and personal belongings within reach  - Initiate and maintain comfort rounds  - Make Fall Risk Sign visible to staff  - Offer Toileting every  Hours, in advance of need  - Initiate/Maintain alarm  - Obtain necessary fall risk management equipment:   - Apply yellow socks and bracelet for high fall risk patients  - Consider moving patient to room near nurses station  Outcome: Progressing     Problem: DISCHARGE PLANNING  Goal: Discharge to home or other facility with appropriate resources  Description: INTERVENTIONS:  - Identify barriers to discharge w/patient and caregiver  - Arrange for needed discharge resources and transportation as appropriate  - Identify discharge learning needs (meds, wound care, etc.)  - Arrange for interpretive services to assist at discharge as needed  - Refer to Case Management Department for coordinating discharge planning if the patient needs post-hospital services based on physician/advanced practitioner order or complex needs related to functional status, cognitive ability, or social support system  Outcome: Progressing Problem: Knowledge Deficit  Goal: Patient/family/caregiver demonstrates understanding of disease process, treatment plan, medications, and discharge instructions  Description: Complete learning assessment and assess knowledge base.   Interventions:  - Provide teaching at level of understanding  - Provide teaching via preferred learning methods  Outcome: Progressing     Problem: METABOLIC, FLUID AND ELECTROLYTES - ADULT  Goal: Electrolytes maintained within normal limits  Description: INTERVENTIONS:  - Monitor labs and assess patient for signs and symptoms of electrolyte imbalances  - Administer electrolyte replacement as ordered  - Monitor response to electrolyte replacements, including repeat lab results as appropriate  - Instruct patient on fluid and nutrition as appropriate  Outcome: Progressing  Goal: Fluid balance maintained  Description: INTERVENTIONS:  - Monitor labs   - Monitor I/O and WT  - Instruct patient on fluid and nutrition as appropriate  - Assess for signs & symptoms of volume excess or deficit  Outcome: Progressing     Problem: SKIN/TISSUE INTEGRITY - ADULT  Goal: Skin Integrity remains intact(Skin Breakdown Prevention)  Description: Assess:  -Perform [place] assessment every   -Clean and moisturize skin every   -Inspect skin when repositioning, toileting, and assisting with ADLS  -Assess under medical devices such as  every   -Assess extremities for adequate circulation and sensation     Bed Management:  -Have minimal linens on bed & keep smooth, unwrinkled  -Change linens as needed when moist or perspiring  -Avoid sitting or lying in one position for more than  hours while in bed  -Keep HOB at degrees     Toileting:  -Offer bedside commode  -Assess for incontinence every   -Use incontinent care products after each incontinent episode such as     Activity:  -Mobilize patient  times a day  -Encourage activity and walks on unit  -Encourage or provide ROM exercises   -Turn and reposition patient every  Hours  -Use appropriate equipment to lift or move patient in bed  -Instruct/ Assist with weight shifting every  when out of bed in chair  -Consider limitation of chair time  hour intervals    Skin Care:  -Avoid use of baby powder, tape, friction and shearing, hot water or constrictive clothing  -Relieve pressure over bony prominences using   -Do not massage red bony areas    Next Steps:  -Teach patient strategies to minimize risks such as    -Consider consults to  interdisciplinary teams such as   Outcome: Progressing  Goal: Incision(s), wounds(s) or drain site(s) healing without S/S of infection  Description: INTERVENTIONS  - Assess and document dressing, incision, wound bed, drain sites and surrounding tissue  - Provide patient and family education  - Perform skin care/dressing changes every  Outcome: Progressing     Problem: Prexisting or High Potential for Compromised Skin Integrity  Goal: Skin integrity is maintained or improved  Description: INTERVENTIONS:  - Identify patients at risk for skin breakdown  - Assess and monitor skin integrity  - Assess and monitor nutrition and hydration status  - Monitor labs   - Assess for incontinence   - Turn and reposition patient  - Assist with mobility/ambulation  - Relieve pressure over bony prominences  - Avoid friction and shearing  - Provide appropriate hygiene as needed including keeping skin clean and dry  - Evaluate need for skin moisturizer/barrier cream  - Collaborate with interdisciplinary team   - Patient/family teaching  - Consider wound care consult   Outcome: Progressing     Problem: Nutrition/Hydration-ADULT  Goal: Nutrient/Hydration intake appropriate for improving, restoring or maintaining nutritional needs  Description: Monitor and assess patient's nutrition/hydration status for malnutrition. Collaborate with interdisciplinary team and initiate plan and interventions as ordered. Monitor patient's weight and dietary intake as ordered or per policy. Utilize nutrition screening tool and intervene as necessary. Determine patient's food preferences and provide high-protein, high-caloric foods as appropriate.      INTERVENTIONS:  - Monitor oral intake, urinary output, labs, and treatment plans  - Assess nutrition and hydration status and recommend course of action  - Evaluate amount of meals eaten  - Assist patient with eating if necessary   - Allow adequate time for meals  - Recommend/ encourage appropriate diets, oral nutritional supplements, and vitamin/mineral supplements  - Order, calculate, and assess calorie counts as needed  - Recommend, monitor, and adjust tube feedings and TPN/PPN based on assessed needs  - Assess need for intravenous fluids  - Provide specific nutrition/hydration education as appropriate  - Include patient/family/caregiver in decisions related to nutrition  Outcome: Progressing

## 2023-09-16 NOTE — PROGRESS NOTES
[id number] [facility]  Progress Note  Name: Juany Haines  MRN: [id number]  Unit/Bed#: -01 I Date of Admission: 9/9/2023   Date of Service: 9/16/2023 I Hospital Day: 7    Assessment/Plan   * Cellulitis- Ruled Out  Assessment & Plan  · Findings more concerning for chronic/ poorly controlled lymphedema of the pannus  · Afebrile and without leukocytosis  · Will give IV diuresis while hospitalized to better control her edema  · ID evaluation appreciated  · Will continue to monitor off abx therapy     Chronic atrial fibrillation (HCC)  Assessment & Plan  · Rate-controlled without AV kiko blocking agents  · Continue Warfarin 2mg SMWFS and 4mg TT  · INR has remained therapeutic- continue to monitor     Chronic diastolic congestive heart failure ([address])  Assessment & Plan  · Continue home Midodrine 2.5mg TID  · Patient requesting Lasix be changed to IV while inpatient to aide in her lymphedema  · Will continue careful monitoring of her fluid status and renal function       Left leg pain  Assessment & Plan  · Patient reporting left leg pain/tightness. · Venous Duplex (9/12): No evidence of acute or chronic deep vein thrombosis. No evidence of superficial thrombophlebitis noted. · Patient has received IV diuresis as above for chronic lymphedema- will continue    Class 3 severe obesity due to excess calories with serious comorbidity and body mass index (BMI) greater than or equal to 70 in [facility])  Assessment & Plan  · BMI of 74.55  · Encouraged lifestyle modifications and weight loss  · Calorie restricted diet           VTE Pharmacologic Prophylaxis: VTE Score: 5 High Risk (Score >/= 5) - Pharmacological DVT Prophylaxis Ordered: warfarin (Coumadin). Sequential Compression Devices Ordered. Patient Centered Rounds: I performed bedside rounds with nursing staff today.   Discussions with Specialists or Other Care Team Provider: nursing, CM    Education and Discussions with Family / Patient: Patient declined call to . Total Time Spent on Date of Encounter in care of patient: 25 mins. This time was spent on one or more of the following: performing physical exam; counseling and coordination of care; obtaining or reviewing history; documenting in the medical record; reviewing/ordering tests, medications or procedures; communicating with other healthcare professionals and discussing with patient's family/caregivers. Current Length of Stay: 7 day(s)  Current Patient Status: Inpatient   Certification Statement: The patient will continue to require additional inpatient hospital stay due to safe discharge planning- patient's caregiver not available until   Discharge Plan: Anticipate discharge in 48 hrs to home with home services. Code Status: Level 1 - Full Code    Subjective: The patient was seen and examined. The patient is lying in bed in no acute distress. She states she feels her edema is improving. Overall she reports feeling improved. Objective:     Vitals:   Temp (24hrs), Av.5 °F (36.4 °C), Min:97.5 °F (36.4 °C), Max:97.5 °F (36.4 °C)    Temp:  [97.5 °F (36.4 °C)] 97.5 °F (36.4 °C)  HR:  [62-66] 66  Resp:  [18-20] 18  BP: (108-109)/(54-58) 108/58  SpO2:  [96 %] 96 %  Body mass index is 73.34 kg/m². Input and Output Summary (last 24 hours): Intake/Output Summary (Last 24 hours) at 2023 1008  Last data filed at 2023 0900  Gross per 24 hour   Intake 120 ml   Output --   Net 120 ml       Physical Exam:   Physical Exam  Vitals and nursing note reviewed. Constitutional:       General: She is awake. Appearance: She is morbidly obese. HENT:      Head: Normocephalic and atraumatic. Cardiovascular:      Rate and Rhythm: Normal rate and regular rhythm. Heart sounds: Normal heart sounds. Pulmonary:      Effort: Pulmonary effort is normal.      Breath sounds: Normal breath sounds. Abdominal:      Palpations: Abdomen is soft. Tenderness: There is no abdominal tenderness. Comments: Resolving erythema/edema right flank   Skin:     General: Skin is warm and dry. Neurological:      General: No focal deficit present. Mental Status: She is alert and oriented to person, place, and time. Psychiatric:         Attention and Perception: Attention normal.         Mood and Affect: Mood normal.         Speech: Speech normal.         Behavior: Behavior is cooperative. Additional Data:     Labs:  Results from last 7 days   Lab Units 09/16/23  0540 09/12/23  0550 09/11/23  0536   WBC Thousand/uL 14.27*   < > 12.79*   HEMOGLOBIN g/dL 10.0*   < > 9.8*   HEMATOCRIT % 35.6   < > 35.5   PLATELETS Thousands/uL 557*   < > 605*   NEUTROS PCT %  --   --  80*   LYMPHS PCT %  --   --  7*   MONOS PCT %  --   --  5   EOS PCT %  --   --  5    < > = values in this interval not displayed. Results from last 7 days   Lab Units 09/16/23  0540 09/10/23  0616 09/09/23  1325   SODIUM mmol/L 138   < > 135   POTASSIUM mmol/L 3.8   < > 3.5   CHLORIDE mmol/L 100   < > 98   CO2 mmol/L 31   < > 32   BUN mg/dL 27*   < > 24   CREATININE mg/dL 1.17   < > 1.14   ANION GAP mmol/L 7   < > 5   CALCIUM mg/dL 8.4   < > 7.9*   ALBUMIN g/dL  --   --  2.5*   TOTAL BILIRUBIN mg/dL  --   --  0.50   ALK PHOS U/L  --   --  74   ALT U/L  --   --  19   AST U/L  --   --  29   GLUCOSE RANDOM mg/dL 110   < > 108    < > = values in this interval not displayed. Results from last 7 days   Lab Units 09/14/23  0607   INR  2.66*             Results from last 7 days   Lab Units 09/09/23  1325   LACTIC ACID mmol/L 1.1   PROCALCITONIN ng/ml 0.31*       Lines/Drains:  Invasive Devices     Peripheral Intravenous Line  Duration           Peripheral IV 09/09/23 Right Antecubital 6 days          Drain  Duration           External Urinary Catheter 3 days                      Imaging: No pertinent imaging reviewed.     Recent Cultures (last 7 days):   Results from last 7 days   Lab Units 09/09/23  1331 09/09/23  1325   BLOOD CULTURE  No Growth After 5 Days. No Growth After 5 Days. Last 24 Hours Medication List:   Current Facility-Administered Medications   Medication Dose Route Frequency Provider Last Rate   • acetaminophen  650 mg Oral Q4H PRN Lorra Ranch, DO     • allopurinol  100 mg Oral Daily Lorra Ranch, DO     • docusate sodium  100 mg Oral Daily Lorra Ranch, DO     • DULoxetine  20 mg Oral Daily Lorra Ranch, DO     • furosemide  40 mg Intravenous Once Sean Gama PA-C     • gabapentin  100 mg Oral TID Lorra Ranch, DO     • levothyroxine  125 mcg Oral Early Morning Lorra Ranch, DO     • midodrine  2.5 mg Oral TID AC Lorra Ranch, DO     • nystatin   Topical BID Lorra Ranch, DO     • ondansetron  4 mg Intravenous Q6H PRN Lorra Ranch, DO     • warfarin  2 mg Oral Once per day on Sun Mon Wed Fri Sat Lorjordan Ranch, DO     • warfarin  4 mg Oral Once per day on Tue Thu Thiago Murdock DO          Today, Patient Was Seen By: Sean Gama PA-C    **Please Note: This note may have been constructed using a voice recognition system. **

## 2023-09-16 NOTE — ASSESSMENT & PLAN NOTE
· Rate-controlled without AV kiko blocking agents  · Continue Warfarin 2mg SMWFS and 4mg TT  · INR has remained therapeutic- continue to monitor

## 2023-09-17 LAB
ANION GAP SERPL CALCULATED.3IONS-SCNC: 6 MMOL/L
BUN SERPL-MCNC: 27 MG/DL (ref 5–25)
CALCIUM SERPL-MCNC: 8.4 MG/DL (ref 8.4–10.2)
CHLORIDE SERPL-SCNC: 99 MMOL/L (ref 96–108)
CO2 SERPL-SCNC: 33 MMOL/L (ref 21–32)
CREAT SERPL-MCNC: 1.19 MG/DL (ref 0.6–1.3)
ERYTHROCYTE [DISTWIDTH] IN BLOOD BY AUTOMATED COUNT: 18.4 % (ref 11.6–15.1)
GFR SERPL CREATININE-BSD FRML MDRD: 45 ML/MIN/1.73SQ M
GLUCOSE SERPL-MCNC: 101 MG/DL (ref 65–140)
HCT VFR BLD AUTO: 35 % (ref 34.8–46.1)
HGB BLD-MCNC: 9.9 G/DL (ref 11.5–15.4)
INR PPP: 2.66 (ref 0.84–1.19)
MCH RBC QN AUTO: 23.9 PG (ref 26.8–34.3)
MCHC RBC AUTO-ENTMCNC: 28.3 G/DL (ref 31.4–37.4)
MCV RBC AUTO: 85 FL (ref 82–98)
PLATELET # BLD AUTO: 573 THOUSANDS/UL (ref 149–390)
PMV BLD AUTO: 8.5 FL (ref 8.9–12.7)
POTASSIUM SERPL-SCNC: 3.7 MMOL/L (ref 3.5–5.3)
PROTHROMBIN TIME: 28.4 SECONDS (ref 11.6–14.5)
RBC # BLD AUTO: 4.14 MILLION/UL (ref 3.81–5.12)
SODIUM SERPL-SCNC: 138 MMOL/L (ref 135–147)
WBC # BLD AUTO: 11.69 THOUSAND/UL (ref 4.31–10.16)

## 2023-09-17 PROCEDURE — 80048 BASIC METABOLIC PNL TOTAL CA: CPT | Performed by: PHYSICIAN ASSISTANT

## 2023-09-17 PROCEDURE — 85027 COMPLETE CBC AUTOMATED: CPT | Performed by: PHYSICIAN ASSISTANT

## 2023-09-17 PROCEDURE — 99232 SBSQ HOSP IP/OBS MODERATE 35: CPT | Performed by: PHYSICIAN ASSISTANT

## 2023-09-17 PROCEDURE — 85610 PROTHROMBIN TIME: CPT | Performed by: PHYSICIAN ASSISTANT

## 2023-09-17 RX ORDER — FUROSEMIDE 10 MG/ML
40 INJECTION INTRAMUSCULAR; INTRAVENOUS ONCE
Status: COMPLETED | OUTPATIENT
Start: 2023-09-17 | End: 2023-09-17

## 2023-09-17 RX ORDER — GINSENG 100 MG
1 CAPSULE ORAL 2 TIMES DAILY
Status: DISCONTINUED | OUTPATIENT
Start: 2023-09-17 | End: 2023-09-18 | Stop reason: HOSPADM

## 2023-09-17 RX ADMIN — MIDODRINE HYDROCHLORIDE 2.5 MG: 5 TABLET ORAL at 06:46

## 2023-09-17 RX ADMIN — GABAPENTIN 100 MG: 100 CAPSULE ORAL at 21:06

## 2023-09-17 RX ADMIN — MIDODRINE HYDROCHLORIDE 2.5 MG: 5 TABLET ORAL at 12:00

## 2023-09-17 RX ADMIN — ALLOPURINOL 100 MG: 100 TABLET ORAL at 10:00

## 2023-09-17 RX ADMIN — BACITRACIN 1 LARGE APPLICATION: 500 OINTMENT TOPICAL at 21:06

## 2023-09-17 RX ADMIN — DULOXETINE HYDROCHLORIDE 20 MG: 20 CAPSULE, DELAYED RELEASE ORAL at 10:00

## 2023-09-17 RX ADMIN — NYSTATIN: 100000 POWDER TOPICAL at 16:49

## 2023-09-17 RX ADMIN — MIDODRINE HYDROCHLORIDE 2.5 MG: 5 TABLET ORAL at 16:48

## 2023-09-17 RX ADMIN — BACITRACIN 1 LARGE APPLICATION: 500 OINTMENT TOPICAL at 13:58

## 2023-09-17 RX ADMIN — LEVOTHYROXINE SODIUM 125 MCG: 25 TABLET ORAL at 05:00

## 2023-09-17 RX ADMIN — WARFARIN SODIUM 2 MG: 2 TABLET ORAL at 16:49

## 2023-09-17 RX ADMIN — GABAPENTIN 100 MG: 100 CAPSULE ORAL at 10:00

## 2023-09-17 RX ADMIN — NYSTATIN: 100000 POWDER TOPICAL at 10:17

## 2023-09-17 RX ADMIN — GABAPENTIN 100 MG: 100 CAPSULE ORAL at 16:48

## 2023-09-17 RX ADMIN — FUROSEMIDE 40 MG: 10 INJECTION, SOLUTION INTRAMUSCULAR; INTRAVENOUS at 10:18

## 2023-09-17 NOTE — PLAN OF CARE
Problem: PAIN - ADULT  Goal: Verbalizes/displays adequate comfort level or baseline comfort level  Description: Interventions:  - Encourage patient to monitor pain and request assistance  - Assess pain using appropriate pain scale  - Administer analgesics based on type and severity of pain and evaluate response  - Implement non-pharmacological measures as appropriate and evaluate response  - Consider cultural and social influences on pain and pain management  - Notify physician/advanced practitioner if interventions unsuccessful or patient reports new pain  Outcome: Progressing     Problem: INFECTION - ADULT  Goal: Absence or prevention of progression during hospitalization  Description: INTERVENTIONS:  - Assess and monitor for signs and symptoms of infection  - Monitor lab/diagnostic results  - Monitor all insertion sites, i.e. indwelling lines, tubes, and drains  - Monitor endotracheal if appropriate and nasal secretions for changes in amount and color  - [place] appropriate cooling/warming therapies per order  - Administer medications as ordered  - Instruct and encourage patient and family to use good hand hygiene technique  - Identify and instruct in appropriate isolation precautions for identified infection/condition  Outcome: Progressing  Goal: Absence of fever/infection during neutropenic period  Description: INTERVENTIONS:  - Monitor WBC    Outcome: Progressing
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
## 2023-09-17 NOTE — PLAN OF CARE
Problem: MOBILITY - ADULT  Goal: Maintain or return to baseline ADL function  Description: INTERVENTIONS:  -  Assess patient's ability to carry out ADLs; assess patient's baseline for ADL function and identify physical deficits which impact ability to perform ADLs (bathing, care of mouth/teeth, toileting, grooming, dressing, etc.)  - Assess/evaluate cause of self-care deficits   - Assess range of motion  - Assess patient's mobility; develop plan if impaired  - Assess patient's need for assistive devices and provide as appropriate  - Encourage maximum independence but intervene and supervise when necessary  - Involve family in performance of ADLs  - Assess for home care needs following discharge   - Consider OT consult to assist with ADL evaluation and planning for discharge  - Provide patient education as appropriate  Outcome: Progressing  Goal: Maintains/Returns to pre admission functional level  Description: INTERVENTIONS:  - Perform BMAT or MOVE assessment daily.   - Set and communicate daily mobility goal to care team and patient/family/caregiver. - Collaborate with rehabilitation services on mobility goals if consulted  - Perform Range of Motion  times a day. - Reposition patient every  hours.   - Dangle patient  times a day  - Stand patient  times a day  - Ambulate patient  times a day  - Out of bed to chair  times a day   - Out of bed for meals  times a day  - Out of bed for toileting  - Record patient progress and toleration of activity level   Outcome: Progressing     Problem: PAIN - ADULT  Goal: Verbalizes/displays adequate comfort level or baseline comfort level  Description: Interventions:  - Encourage patient to monitor pain and request assistance  - Assess pain using appropriate pain scale  - Administer analgesics based on type and severity of pain and evaluate response  - Implement non-pharmacological measures as appropriate and evaluate response  - Consider cultural and social influences on pain and pain management  - Notify physician/advanced practitioner if interventions unsuccessful or patient reports new pain  Outcome: Progressing     Problem: INFECTION - ADULT  Goal: Absence or prevention of progression during hospitalization  Description: INTERVENTIONS:  - Assess and monitor for signs and symptoms of infection  - Monitor lab/diagnostic results  - Monitor all insertion sites, i.e. indwelling lines, tubes, and drains  - Monitor endotracheal if appropriate and nasal secretions for changes in amount and color  - [place] appropriate cooling/warming therapies per order  - Administer medications as ordered  - Instruct and encourage patient and family to use good hand hygiene technique  - Identify and instruct in appropriate isolation precautions for identified infection/condition  Outcome: Progressing  Goal: Absence of fever/infection during neutropenic period  Description: INTERVENTIONS:  - Monitor WBC    Outcome: Progressing     Problem: SAFETY ADULT  Goal: Maintain or return to baseline ADL function  Description: INTERVENTIONS:  -  Assess patient's ability to carry out ADLs; assess patient's baseline for ADL function and identify physical deficits which impact ability to perform ADLs (bathing, care of mouth/teeth, toileting, grooming, dressing, etc.)  - Assess/evaluate cause of self-care deficits   - Assess range of motion  - Assess patient's mobility; develop plan if impaired  - Assess patient's need for assistive devices and provide as appropriate  - Encourage maximum independence but intervene and supervise when necessary  - Involve family in performance of ADLs  - Assess for home care needs following discharge   - Consider OT consult to assist with ADL evaluation and planning for discharge  - Provide patient education as appropriate  Outcome: Progressing  Goal: Maintains/Returns to pre admission functional level  Description: INTERVENTIONS:  - Perform BMAT or MOVE assessment daily.   - Set and communicate daily mobility goal to care team and patient/family/caregiver. - Collaborate with rehabilitation services on mobility goals if consulted  - Perform Range of Motion  times a day. - Reposition patient every  hours.   - Dangle patient  times a day  - Stand patient  times a day  - Ambulate patient  times a day  - Out of bed to chair  times a day   - Out of bed for meals  times a day  - Out of bed for toileting  - Record patient progress and toleration of activity level   Outcome: Progressing  Goal: Patient will remain free of falls  Description: INTERVENTIONS:  - Educate patient/family on patient safety including physical limitations  - Instruct patient to call for assistance with activity   - Consult OT/PT to assist with strengthening/mobility   - Keep Call bell within reach  - Keep bed low and locked with side rails adjusted as appropriate  - Keep care items and personal belongings within reach  - Initiate and maintain comfort rounds  - Make Fall Risk Sign visible to staff  - Offer Toileting every  Hours, in advance of need  - Initiate/Maintain alarm  - Obtain necessary fall risk management equipment:   - Apply yellow socks and bracelet for high fall risk patients  - Consider moving patient to room near nurses station  Outcome: Progressing     Problem: DISCHARGE PLANNING  Goal: Discharge to home or other facility with appropriate resources  Description: INTERVENTIONS:  - Identify barriers to discharge w/patient and caregiver  - Arrange for needed discharge resources and transportation as appropriate  - Identify discharge learning needs (meds, wound care, etc.)  - Arrange for interpretive services to assist at discharge as needed  - Refer to Case Management Department for coordinating discharge planning if the patient needs post-hospital services based on physician/advanced practitioner order or complex needs related to functional status, cognitive ability, or social support system  Outcome: Progressing Problem: Knowledge Deficit  Goal: Patient/family/caregiver demonstrates understanding of disease process, treatment plan, medications, and discharge instructions  Description: Complete learning assessment and assess knowledge base.   Interventions:  - Provide teaching at level of understanding  - Provide teaching via preferred learning methods  Outcome: Progressing     Problem: METABOLIC, FLUID AND ELECTROLYTES - ADULT  Goal: Electrolytes maintained within normal limits  Description: INTERVENTIONS:  - Monitor labs and assess patient for signs and symptoms of electrolyte imbalances  - Administer electrolyte replacement as ordered  - Monitor response to electrolyte replacements, including repeat lab results as appropriate  - Instruct patient on fluid and nutrition as appropriate  Outcome: Progressing  Goal: Fluid balance maintained  Description: INTERVENTIONS:  - Monitor labs   - Monitor I/O and WT  - Instruct patient on fluid and nutrition as appropriate  - Assess for signs & symptoms of volume excess or deficit  Outcome: Progressing     Problem: SKIN/TISSUE INTEGRITY - ADULT  Goal: Skin Integrity remains intact(Skin Breakdown Prevention)  Description: Assess:  -Perform Gucci assessment every   -Clean and moisturize skin every   -Inspect skin when repositioning, toileting, and assisting with ADLS  -Assess under medical devices such as  every   -Assess extremities for adequate circulation and sensation     Bed Management:  -Have minimal linens on bed & keep smooth, unwrinkled  -Change linens as needed when moist or perspiring  -Avoid sitting or lying in one position for more than  hours while in bed  -Keep HOB at degrees     Toileting:  -Offer bedside commode  -Assess for incontinence every   -Use incontinent care products after each incontinent episode such as     Activity:  -Mobilize patient  times a day  -Encourage activity and walks on unit  -Encourage or provide ROM exercises   -Turn and reposition patient every  Hours  -Use appropriate equipment to lift or move patient in bed  -Instruct/ Assist with weight shifting every  when out of bed in chair  -Consider limitation of chair time  hour intervals    Skin Care:  -Avoid use of baby powder, tape, friction and shearing, hot water or constrictive clothing  -Relieve pressure over bony prominences using   -Do not massage red bony areas    Next Steps:  -Teach patient strategies to minimize risks such as    -Consider consults to  interdisciplinary teams such as   Outcome: Progressing  Goal: Incision(s), wounds(s) or drain site(s) healing without S/S of infection  Description: INTERVENTIONS  - Assess and document dressing, incision, wound bed, drain sites and surrounding tissue  - Provide patient and family education  - Perform skin care/dressing changes every   Outcome: Progressing     Problem: Prexisting or High Potential for Compromised Skin Integrity  Goal: Skin integrity is maintained or improved  Description: INTERVENTIONS:  - Identify patients at risk for skin breakdown  - Assess and monitor skin integrity  - Assess and monitor nutrition and hydration status  - Monitor labs   - Assess for incontinence   - Turn and reposition patient  - Assist with mobility/ambulation  - Relieve pressure over bony prominences  - Avoid friction and shearing  - Provide appropriate hygiene as needed including keeping skin clean and dry  - Evaluate need for skin moisturizer/barrier cream  - Collaborate with interdisciplinary team   - Patient/family teaching  - Consider wound care consult   Outcome: Progressing     Problem: Nutrition/Hydration-ADULT  Goal: Nutrient/Hydration intake appropriate for improving, restoring or maintaining nutritional needs  Description: Monitor and assess patient's nutrition/hydration status for malnutrition. Collaborate with interdisciplinary team and initiate plan and interventions as ordered.   Monitor patient's weight and dietary intake as ordered or per policy. Utilize nutrition screening tool and intervene as necessary. Determine patient's food preferences and provide high-protein, high-caloric foods as appropriate.      INTERVENTIONS:  - Monitor oral intake, urinary output, labs, and treatment plans  - Assess nutrition and hydration status and recommend course of action  - Evaluate amount of meals eaten  - Assist patient with eating if necessary   - Allow adequate time for meals  - Recommend/ encourage appropriate diets, oral nutritional supplements, and vitamin/mineral supplements  - Order, calculate, and assess calorie counts as needed  - Recommend, monitor, and adjust tube feedings and TPN/PPN based on assessed needs  - Assess need for intravenous fluids  - Provide specific nutrition/hydration education as appropriate  - Include patient/family/caregiver in decisions related to nutrition  Outcome: Progressing

## 2023-09-18 VITALS
WEIGHT: 293 LBS | TEMPERATURE: 97.9 F | RESPIRATION RATE: 18 BRPM | HEIGHT: 64 IN | DIASTOLIC BLOOD PRESSURE: 63 MMHG | HEART RATE: 80 BPM | BODY MASS INDEX: 50.02 KG/M2 | SYSTOLIC BLOOD PRESSURE: 114 MMHG | OXYGEN SATURATION: 93 %

## 2023-09-18 LAB
ANION GAP SERPL CALCULATED.3IONS-SCNC: 6 MMOL/L
BUN SERPL-MCNC: 29 MG/DL (ref 5–25)
CALCIUM SERPL-MCNC: 8.4 MG/DL (ref 8.4–10.2)
CHLORIDE SERPL-SCNC: 99 MMOL/L (ref 96–108)
CO2 SERPL-SCNC: 33 MMOL/L (ref 21–32)
CREAT SERPL-MCNC: 1.16 MG/DL (ref 0.6–1.3)
GFR SERPL CREATININE-BSD FRML MDRD: 47 ML/MIN/1.73SQ M
GLUCOSE SERPL-MCNC: 104 MG/DL (ref 65–140)
INR PPP: 2.6 (ref 0.84–1.19)
POTASSIUM SERPL-SCNC: 4.2 MMOL/L (ref 3.5–5.3)
PROTHROMBIN TIME: 28 SECONDS (ref 11.6–14.5)
SODIUM SERPL-SCNC: 138 MMOL/L (ref 135–147)

## 2023-09-18 PROCEDURE — 99238 HOSP IP/OBS DSCHRG MGMT 30/<: CPT | Performed by: PHYSICIAN ASSISTANT

## 2023-09-18 PROCEDURE — 80048 BASIC METABOLIC PNL TOTAL CA: CPT | Performed by: PHYSICIAN ASSISTANT

## 2023-09-18 PROCEDURE — 85610 PROTHROMBIN TIME: CPT | Performed by: PHYSICIAN ASSISTANT

## 2023-09-18 RX ORDER — NYSTATIN 100000 [USP'U]/G
POWDER TOPICAL 2 TIMES DAILY
Qty: 30 G | Refills: 0 | Status: SHIPPED | OUTPATIENT
Start: 2023-09-18

## 2023-09-18 RX ORDER — FUROSEMIDE 40 MG/1
60 TABLET ORAL DAILY
Qty: 45 TABLET | Refills: 0 | Status: SHIPPED | OUTPATIENT
Start: 2023-09-18 | End: 2023-10-18

## 2023-09-18 RX ADMIN — MIDODRINE HYDROCHLORIDE 2.5 MG: 5 TABLET ORAL at 06:09

## 2023-09-18 RX ADMIN — DULOXETINE HYDROCHLORIDE 20 MG: 20 CAPSULE, DELAYED RELEASE ORAL at 08:01

## 2023-09-18 RX ADMIN — GABAPENTIN 100 MG: 100 CAPSULE ORAL at 08:01

## 2023-09-18 RX ADMIN — LEVOTHYROXINE SODIUM 125 MCG: 25 TABLET ORAL at 06:04

## 2023-09-18 RX ADMIN — ALLOPURINOL 100 MG: 100 TABLET ORAL at 08:01
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
## 2023-09-18 NOTE — ASSESSMENT & PLAN NOTE
· Findings more concerning for chronic/ poorly controlled lymphedema of the pannus  · Afebrile and without leukocytosis  · The patient received IV diuresis while hospitalized to better control her edema  · ID evaluation appreciated- monitored off abx therapy- erythema/edema improved with IV diuresis. · The patient was discharged home on increased dose of lasix at 60 mg po daily. Spoke with nephrology regarding this prior to discharge.    · Outpatient follow-up with PCP

## 2023-09-18 NOTE — ASSESSMENT & PLAN NOTE
· Patient reporting left leg pain/tightness. · Venous Duplex (9/12): No evidence of acute or chronic deep vein thrombosis. No evidence of superficial thrombophlebitis noted. · Patient has received IV diuresis as above for chronic lymphedema.  Patient to be discharged on PO lasix 60 mg daily

## 2023-09-18 NOTE — ASSESSMENT & PLAN NOTE
· Continue home Midodrine 2.5mg TID  · Patient requesting Lasix be changed to IV while inpatient to aide in her lymphedema  · The patient was discharged on PO lasix 60 mg daily as patient reports lasix not working at home.  I discussed this with Nephrology prior to discharge  · BMP in 1 week  · Outpatient follow-up with PCP and nephrology

## 2023-09-18 NOTE — ASSESSMENT & PLAN NOTE
· Rate-controlled without AV kiko blocking agents  · Continue Warfarin 2mg SMWFS and 4mg TT  · INR has remained therapeutic

## 2023-09-18 NOTE — PROGRESS NOTES
All discharge instructions reviewed with pt. Raza removed. aRtna removed. Script for lab given to patient. Scripts called to pharmacy by MD.  Pt discharged to home and left with ambulance personnel.

## 2024-01-15 ENCOUNTER — APPOINTMENT (EMERGENCY)
Dept: RADIOLOGY | Facility: HOSPITAL | Age: 74
DRG: 607 | End: 2024-01-15
Payer: MEDICARE

## 2024-01-15 ENCOUNTER — APPOINTMENT (EMERGENCY)
Dept: CT IMAGING | Facility: HOSPITAL | Age: 74
DRG: 607 | End: 2024-01-15
Payer: MEDICARE

## 2024-01-15 ENCOUNTER — HOSPITAL ENCOUNTER (INPATIENT)
Facility: HOSPITAL | Age: 74
LOS: 4 days | Discharge: HOME HEALTH | DRG: 607 | End: 2024-01-19
Attending: EMERGENCY MEDICINE | Admitting: INTERNAL MEDICINE
Payer: MEDICARE

## 2024-01-15 DIAGNOSIS — R07.9 CHEST PAIN: ICD-10-CM

## 2024-01-15 DIAGNOSIS — I48.20 CHRONIC ATRIAL FIBRILLATION (HCC): Chronic | ICD-10-CM

## 2024-01-15 DIAGNOSIS — L03.311 CELLULITIS OF ABDOMINAL WALL: Primary | ICD-10-CM

## 2024-01-15 DIAGNOSIS — I89.0 LYMPHEDEMA: ICD-10-CM

## 2024-01-15 DIAGNOSIS — L03.90 CELLULITIS: ICD-10-CM

## 2024-01-15 DIAGNOSIS — T50.905A ADVERSE EFFECT OF DRUG, INITIAL ENCOUNTER: ICD-10-CM

## 2024-01-15 DIAGNOSIS — R10.9 ABDOMINAL PAIN: ICD-10-CM

## 2024-01-15 LAB
2HR DELTA HS TROPONIN: 0 NG/L
ALBUMIN SERPL BCP-MCNC: 3.1 G/DL (ref 3.5–5)
ALP SERPL-CCNC: 110 U/L (ref 34–104)
ALT SERPL W P-5'-P-CCNC: 9 U/L (ref 7–52)
ANION GAP SERPL CALCULATED.3IONS-SCNC: 7 MMOL/L
APTT PPP: 31 SECONDS (ref 23–37)
AST SERPL W P-5'-P-CCNC: 15 U/L (ref 13–39)
BASOPHILS # BLD AUTO: 0.05 THOUSANDS/ÂΜL (ref 0–0.1)
BASOPHILS NFR BLD AUTO: 0 % (ref 0–1)
BILIRUB SERPL-MCNC: 0.6 MG/DL (ref 0.2–1)
BUN SERPL-MCNC: 40 MG/DL (ref 5–25)
CALCIUM ALBUM COR SERPL-MCNC: 9.9 MG/DL (ref 8.3–10.1)
CALCIUM SERPL-MCNC: 9.2 MG/DL (ref 8.4–10.2)
CARDIAC TROPONIN I PNL SERPL HS: 7 NG/L
CARDIAC TROPONIN I PNL SERPL HS: 7 NG/L
CHLORIDE SERPL-SCNC: 99 MMOL/L (ref 96–108)
CO2 SERPL-SCNC: 32 MMOL/L (ref 21–32)
CREAT SERPL-MCNC: 1.12 MG/DL (ref 0.6–1.3)
EOSINOPHIL # BLD AUTO: 0.25 THOUSAND/ÂΜL (ref 0–0.61)
EOSINOPHIL NFR BLD AUTO: 2 % (ref 0–6)
ERYTHROCYTE [DISTWIDTH] IN BLOOD BY AUTOMATED COUNT: 16.8 % (ref 11.6–15.1)
GFR SERPL CREATININE-BSD FRML MDRD: 49 ML/MIN/1.73SQ M
GLUCOSE SERPL-MCNC: 108 MG/DL (ref 65–140)
HCT VFR BLD AUTO: 40.8 % (ref 34.8–46.1)
HGB BLD-MCNC: 11.9 G/DL (ref 11.5–15.4)
IMM GRANULOCYTES # BLD AUTO: 0.05 THOUSAND/UL (ref 0–0.2)
IMM GRANULOCYTES NFR BLD AUTO: 0 % (ref 0–2)
INR PPP: 1.29 (ref 0.84–1.19)
LACTATE SERPL-SCNC: 0.8 MMOL/L (ref 0.5–2)
LYMPHOCYTES # BLD AUTO: 1.3 THOUSANDS/ÂΜL (ref 0.6–4.47)
LYMPHOCYTES NFR BLD AUTO: 11 % (ref 14–44)
MAGNESIUM SERPL-MCNC: 2.2 MG/DL (ref 1.9–2.7)
MCH RBC QN AUTO: 25.1 PG (ref 26.8–34.3)
MCHC RBC AUTO-ENTMCNC: 29.2 G/DL (ref 31.4–37.4)
MCV RBC AUTO: 86 FL (ref 82–98)
MONOCYTES # BLD AUTO: 0.57 THOUSAND/ÂΜL (ref 0.17–1.22)
MONOCYTES NFR BLD AUTO: 5 % (ref 4–12)
NEUTROPHILS # BLD AUTO: 9.91 THOUSANDS/ÂΜL (ref 1.85–7.62)
NEUTS SEG NFR BLD AUTO: 82 % (ref 43–75)
NRBC BLD AUTO-RTO: 0 /100 WBCS
PLATELET # BLD AUTO: 400 THOUSANDS/UL (ref 149–390)
PMV BLD AUTO: 8.6 FL (ref 8.9–12.7)
POTASSIUM SERPL-SCNC: 4.1 MMOL/L (ref 3.5–5.3)
PROCALCITONIN SERPL-MCNC: <0.05 NG/ML
PROT SERPL-MCNC: 8.1 G/DL (ref 6.4–8.4)
PROTHROMBIN TIME: 16.1 SECONDS (ref 11.6–14.5)
RBC # BLD AUTO: 4.74 MILLION/UL (ref 3.81–5.12)
SODIUM SERPL-SCNC: 138 MMOL/L (ref 135–147)
WBC # BLD AUTO: 12.13 THOUSAND/UL (ref 4.31–10.16)

## 2024-01-15 PROCEDURE — 99223 1ST HOSP IP/OBS HIGH 75: CPT | Performed by: PHYSICIAN ASSISTANT

## 2024-01-15 PROCEDURE — 71045 X-RAY EXAM CHEST 1 VIEW: CPT

## 2024-01-15 PROCEDURE — 96366 THER/PROPH/DIAG IV INF ADDON: CPT

## 2024-01-15 PROCEDURE — 96368 THER/DIAG CONCURRENT INF: CPT

## 2024-01-15 PROCEDURE — 85610 PROTHROMBIN TIME: CPT

## 2024-01-15 PROCEDURE — 87040 BLOOD CULTURE FOR BACTERIA: CPT

## 2024-01-15 PROCEDURE — 99284 EMERGENCY DEPT VISIT MOD MDM: CPT

## 2024-01-15 PROCEDURE — 85025 COMPLETE CBC W/AUTO DIFF WBC: CPT

## 2024-01-15 PROCEDURE — 96365 THER/PROPH/DIAG IV INF INIT: CPT

## 2024-01-15 PROCEDURE — 74177 CT ABD & PELVIS W/CONTRAST: CPT

## 2024-01-15 PROCEDURE — 99285 EMERGENCY DEPT VISIT HI MDM: CPT

## 2024-01-15 PROCEDURE — 83605 ASSAY OF LACTIC ACID: CPT

## 2024-01-15 PROCEDURE — 84484 ASSAY OF TROPONIN QUANT: CPT

## 2024-01-15 PROCEDURE — 85730 THROMBOPLASTIN TIME PARTIAL: CPT

## 2024-01-15 PROCEDURE — 80053 COMPREHEN METABOLIC PANEL: CPT

## 2024-01-15 PROCEDURE — 83735 ASSAY OF MAGNESIUM: CPT

## 2024-01-15 PROCEDURE — 84145 PROCALCITONIN (PCT): CPT

## 2024-01-15 PROCEDURE — 93005 ELECTROCARDIOGRAM TRACING: CPT

## 2024-01-15 PROCEDURE — 36415 COLL VENOUS BLD VENIPUNCTURE: CPT

## 2024-01-15 PROCEDURE — 70491 CT SOFT TISSUE NECK W/DYE: CPT

## 2024-01-15 PROCEDURE — 81003 URINALYSIS AUTO W/O SCOPE: CPT | Performed by: PHYSICIAN ASSISTANT

## 2024-01-15 RX ORDER — DIPHENHYDRAMINE HCL 25 MG
25 TABLET ORAL ONCE
Status: COMPLETED | OUTPATIENT
Start: 2024-01-15 | End: 2024-01-15

## 2024-01-15 RX ORDER — FUROSEMIDE 10 MG/ML
40 INJECTION INTRAMUSCULAR; INTRAVENOUS
Status: DISCONTINUED | OUTPATIENT
Start: 2024-01-16 | End: 2024-01-18

## 2024-01-15 RX ORDER — MIDODRINE HYDROCHLORIDE 5 MG/1
2.5 TABLET ORAL
Status: DISCONTINUED | OUTPATIENT
Start: 2024-01-16 | End: 2024-01-19 | Stop reason: HOSPADM

## 2024-01-15 RX ORDER — DOCUSATE SODIUM 100 MG/1
100 CAPSULE, LIQUID FILLED ORAL DAILY
Status: DISCONTINUED | OUTPATIENT
Start: 2024-01-16 | End: 2024-01-19 | Stop reason: HOSPADM

## 2024-01-15 RX ORDER — NYSTATIN 100000 [USP'U]/G
POWDER TOPICAL 2 TIMES DAILY
Status: DISCONTINUED | OUTPATIENT
Start: 2024-01-15 | End: 2024-01-19 | Stop reason: HOSPADM

## 2024-01-15 RX ORDER — ONDANSETRON 2 MG/ML
4 INJECTION INTRAMUSCULAR; INTRAVENOUS EVERY 6 HOURS PRN
Status: DISCONTINUED | OUTPATIENT
Start: 2024-01-15 | End: 2024-01-19 | Stop reason: HOSPADM

## 2024-01-15 RX ORDER — WARFARIN SODIUM 2 MG/1
2 TABLET ORAL
Status: DISCONTINUED | OUTPATIENT
Start: 2024-01-15 | End: 2024-01-16

## 2024-01-15 RX ORDER — VANCOMYCIN HYDROCHLORIDE 750 MG/150ML
750 INJECTION, SOLUTION INTRAVENOUS EVERY 12 HOURS
Status: DISCONTINUED | OUTPATIENT
Start: 2024-01-16 | End: 2024-01-17 | Stop reason: DRUGHIGH

## 2024-01-15 RX ORDER — WARFARIN SODIUM 2 MG/1
2 TABLET ORAL
Status: DISCONTINUED | OUTPATIENT
Start: 2024-01-16 | End: 2024-01-16

## 2024-01-15 RX ORDER — GABAPENTIN 100 MG/1
100 CAPSULE ORAL 3 TIMES DAILY
Status: DISCONTINUED | OUTPATIENT
Start: 2024-01-15 | End: 2024-01-19 | Stop reason: HOSPADM

## 2024-01-15 RX ORDER — CEFEPIME HYDROCHLORIDE 2 G/50ML
2000 INJECTION, SOLUTION INTRAVENOUS EVERY 12 HOURS
Status: DISCONTINUED | OUTPATIENT
Start: 2024-01-16 | End: 2024-01-19 | Stop reason: HOSPADM

## 2024-01-15 RX ORDER — DULOXETIN HYDROCHLORIDE 20 MG/1
20 CAPSULE, DELAYED RELEASE ORAL DAILY
Status: DISCONTINUED | OUTPATIENT
Start: 2024-01-16 | End: 2024-01-19 | Stop reason: HOSPADM

## 2024-01-15 RX ORDER — ACETAMINOPHEN 325 MG/1
650 TABLET ORAL EVERY 6 HOURS PRN
Status: DISCONTINUED | OUTPATIENT
Start: 2024-01-15 | End: 2024-01-19 | Stop reason: HOSPADM

## 2024-01-15 RX ORDER — ALLOPURINOL 100 MG/1
100 TABLET ORAL DAILY
Status: DISCONTINUED | OUTPATIENT
Start: 2024-01-16 | End: 2024-01-19 | Stop reason: HOSPADM

## 2024-01-15 RX ORDER — CEFEPIME HYDROCHLORIDE 2 G/50ML
2000 INJECTION, SOLUTION INTRAVENOUS ONCE
Status: COMPLETED | OUTPATIENT
Start: 2024-01-15 | End: 2024-01-15

## 2024-01-15 RX ADMIN — DIPHENHYDRAMINE HYDROCHLORIDE 25 MG: 25 TABLET ORAL at 15:28

## 2024-01-15 RX ADMIN — WARFARIN SODIUM 2 MG: 2 TABLET ORAL at 21:41

## 2024-01-15 RX ADMIN — IOHEXOL 100 ML: 350 INJECTION, SOLUTION INTRAVENOUS at 17:36

## 2024-01-15 RX ADMIN — GABAPENTIN 100 MG: 100 CAPSULE ORAL at 21:41

## 2024-01-15 RX ADMIN — CEFEPIME HYDROCHLORIDE 2000 MG: 2 INJECTION, SOLUTION INTRAVENOUS at 15:52

## 2024-01-15 RX ADMIN — VANCOMYCIN HYDROCHLORIDE 1750 MG: 1 INJECTION, POWDER, LYOPHILIZED, FOR SOLUTION INTRAVENOUS at 19:10

## 2024-01-15 RX ADMIN — PREDNISONE 50 MG: 20 TABLET ORAL at 15:28

## 2024-01-15 NOTE — CASE MANAGEMENT
Case Management Progress Note    Patient name Zee Kirk  Location ED 07/ED 07 MRN [id number]  : 1950 Date 1/15/2024       LOS (days): 0  Geometric Mean LOS (GMLOS) (days):   Days to GMLOS:        OBJECTIVE:        Current admission status: Emergency  Preferred Pharmacy:   [facility] [place] [place] - [address]  [address] [address] [facility] [address]  Phone: [phone] Fax: [phone]    [facility] - [place], [place] - [address]  [address] [address]  Phone: [phone] Fax: [phone]    Primary Care Provider: Franklyn Caruso MD    Primary Insurance: MEDICARE  Secondary Insurance: A[employer]    PROGRESS NOTE:  Cm received a consult to set up transport for this pt- pt needs to go to [facility] to have  CT Scan- pt is not able to fit in our ct scan, cm called the CT dept and I was transferred to the ED department, cm was told the pt is to go to their ED and then the CT scan will be done there and the doctor needs to place an order- cm called [place] and they are able to  the pt at 16:15 pm - they were made aware to stay, wilfred was told the scan will not take a long time. ER Rn & ED doctor are aware & the pt was informed also.

## 2024-01-15 NOTE — ED PROVIDER NOTES
"History  Chief Complaint   Patient presents with    Allergic Reaction     Patient presenting to ED from home. Took 4th dose of cipro at midnight and reports throat itching. Benadryl taken lastnight and this am.      Patient is a 73-year-old female past medical history of morbid obesity, A-fib arriving for evaluation of multiple complaints.  First and foremost patient reports that she felt that since she was started on Cipro she noted a tingling sensation in of her lips, and of her tongue.  Patient states that she felt that her tongue was slightly swollen.  However on exam patient has no stridor, no difficulty speaking, no lip swelling, no wheezes, no obvious rash.  Patient states that she took Benadryl last night.  Patient reports that she also feels that the left side of her face may be swollen however there is no discernible difference between her left and her right side of her face.  Patient has no change in phonation.  Patient's voice is at baseline.  Patient has no swelling of her soft palate.  Patient states that this sensation she felt on her lips and in her throat was similar to prior sensation she has had with other allergic reactions, noting that she is allergic to many antibiotics. Patient also reporting that she was started on Cipro for cellulitis after a televisit, reporting that her symptoms started within the past week.  Patient reports that the area she refers to as \"Abhishek,\" is increasingly painful, and noted to have increasing drainage and redness.  Patient states that this occurs, and she is admitted and given cefepime and vancomycin. Patient also reporting that she has been having abdominal pain on her left side.  Patient states that for the past 10 days when she rolls to the side she feels as though there is a bowling ball that is causing her pain in her left abdomen.  Patient is tender in the left upper and lower quadrants.  Pt is that while she was waiting to be seen she was experiencing chest " tightness, but no shortness of breath.  Patient denies any difficulty breathing.  Patient states that the chest tightness comes and goes.  Patient denies any feelings of syncope.  Patient resting comfortably prior to entering room.        Prior to Admission Medications   Prescriptions Last Dose Informant Patient Reported? Taking?   DULoxetine (CYMBALTA) 20 mg capsule Past Week Self No Yes   Sig: Take 1 capsule (20 mg total) by mouth daily   acetaminophen (TYLENOL) 650 mg CR tablet Past Week Self Yes Yes   Sig: Take 650 mg by mouth every 8 (eight) hours as needed (for osteoarthritis)   allopurinol (ZYLOPRIM) 100 mg tablet Past Week Self No Yes   Sig: Take 1 tablet (100 mg total) by mouth daily   docusate sodium (COLACE) 100 mg capsule Not Taking  No No   Sig: Take 1 capsule (100 mg total) by mouth daily Do not start before August 29, 2023.   Patient not taking: Reported on 1/16/2024   furosemide (LASIX) 40 mg tablet Past Week  No Yes   Sig: Take 1.5 tablets (60 mg total) by mouth daily   gabapentin (NEURONTIN) 100 mg capsule Past Week  No Yes   Sig: Take 1 capsule (100 mg total) by mouth 3 (three) times a day   levothyroxine 125 mcg tablet Past Week Self Yes Yes   Sig: Take 125 mcg by mouth daily   midodrine (PROAMATINE) 2.5 mg tablet Past Month  No Yes   Sig: Take 1 tablet (2.5 mg total) by mouth 3 (three) times a day before meals   Patient taking differently: Take 2.5 mg by mouth 3 (three) times a day as needed   nystatin (MYCOSTATIN) powder Past Week  No Yes   Sig: Apply topically 2 (two) times a day   triamcinolone (KENALOG) 0.1 % cream Past Week Self Yes Yes   Sig: Apply 0.1 application topically 2 (two) times a day   warfarin (COUMADIN) 2 mg tablet Past Week  No Yes   Sig: Take 2 mg by mouth once daily on Sunday, Monday, Wednesday, Friday, and Saturday   warfarin (COUMADIN) 2 mg tablet   No No   Sig: Take 4 mg once daily on Tuesday and Thursday Do not start before August 29, 2023.      Facility-Administered  Medications: None       Past Medical History:   Diagnosis Date    Atrial fibrillation (HCC)     Bladder stone     C. difficile colitis     Cellulitis     CHF (congestive heart failure) (HCC)     Depression with anxiety     Disease of thyroid gland     Diverticulitis     Enterocolitis     History of abnormal cervical Pap smear     Kidney stone     Lymphedema     Menopause     Age 50    Morbid obesity with BMI of 70 and over, adult (HCC)     MRSA (methicillin resistant Staphylococcus aureus)     abdominal wound    Osteoarthritis     Phlebitis     left lower leg    Spondylolysis        Past Surgical History:   Procedure Laterality Date    APPENDECTOMY      CARPAL TUNNEL RELEASE      CHOLECYSTECTOMY      CYSTOSCOPY      stent    FOOT SURGERY      bone spur    KNEE SURGERY      WISDOM TOOTH EXTRACTION         Family History   Problem Relation Age of Onset    Heart failure Mother     Heart disease Mother     Lymphoma Mother     Seizures Mother     Kidney disease Father     Heart disease Father     Heart failure Father     Diabetes type II Father     Diabetes type II Sister     Diabetes type II Brother     Uterine cancer Paternal Aunt     Breast cancer Neg Hx     Colon cancer Neg Hx     Ovarian cancer Neg Hx      I have reviewed and agree with the history as documented.    E-Cigarette/Vaping    E-Cigarette Use Never User      E-Cigarette/Vaping Substances    Nicotine No     THC No     CBD No     Flavoring No     Other No     Unknown No      Social History     Tobacco Use    Smoking status: Former     Current packs/day: 0.00     Average packs/day: 5.0 packs/day for 3.0 years (15.0 ttl pk-yrs)     Types: Cigarettes     Start date: 1967     Quit date: 1970     Years since quittin.5    Smokeless tobacco: Never    Tobacco comments:     5 packs/day until  (age 16-19) - As per [employer]    Vaping Use    Vaping status: Never Used   Substance Use Topics    Alcohol use: Not Currently    Drug use: Not Currently      Types: Marijuana     Comment: As a teen - As per [employer]        Review of Systems   Constitutional: Negative.    HENT:  Positive for facial swelling.    Eyes: Negative.    Respiratory: Negative.  Negative for shortness of breath.    Cardiovascular:  Positive for chest pain. Negative for palpitations and leg swelling.   Gastrointestinal: Negative.    Endocrine: Negative.    Genitourinary: Negative.    Musculoskeletal: Negative.    Skin:  Positive for wound.   Allergic/Immunologic: Negative.    Neurological: Negative.    Hematological: Negative.    Psychiatric/Behavioral: Negative.     All other systems reviewed and are negative.      Physical Exam  Physical Exam  Vitals and nursing note reviewed.   Constitutional:       Appearance: Normal appearance. She is normal weight.   HENT:      Head: Normocephalic and atraumatic.      Jaw: There is normal jaw occlusion.      Right Ear: External ear normal.      Left Ear: External ear normal.      Nose: Nose normal. No congestion.      Mouth/Throat:      Lips: Pink.      Mouth: Mucous membranes are moist.      Tongue: Tongue does not deviate from midline.      Pharynx: Oropharynx is clear. Uvula midline. No pharyngeal swelling, oropharyngeal exudate, posterior oropharyngeal erythema or uvula swelling.      Tonsils: No tonsillar exudate or tonsillar abscesses.      Comments: No tongue swelling, no lip swelling.   Eyes:      General:         Right eye: No discharge.         Left eye: No discharge.      Extraocular Movements: Extraocular movements intact.      Conjunctiva/sclera: Conjunctivae normal.      Pupils: Pupils are equal, round, and reactive to light.   Cardiovascular:      Rate and Rhythm: Normal rate and regular rhythm.      Pulses: Normal pulses.      Heart sounds: Normal heart sounds.   Pulmonary:      Effort: Pulmonary effort is normal.      Breath sounds: Normal breath sounds.   Abdominal:      General: Abdomen is flat. Bowel sounds are normal.       Palpations: Abdomen is soft.      Tenderness: There is abdominal tenderness. There is no right CVA tenderness or guarding.       Musculoskeletal:      Cervical back: Normal range of motion and neck supple.   Skin:     General: Skin is warm.      Capillary Refill: Capillary refill takes less than 2 seconds.   Neurological:      General: No focal deficit present.      Mental Status: She is alert and oriented to person, place, and time. Mental status is at baseline.      GCS: GCS eye subscore is 4. GCS verbal subscore is 5. GCS motor subscore is 6.   Psychiatric:         Mood and Affect: Mood normal.         Behavior: Behavior normal.         Thought Content: Thought content normal.         Judgment: Judgment normal.         Vital Signs  ED Triage Vitals   Temperature Pulse Respirations Blood Pressure SpO2   01/15/24 1308 01/15/24 1308 01/15/24 1308 01/15/24 1308 01/15/24 1308   98.5 °F (36.9 °C) 65 20 136/61 98 %      Temp Source Heart Rate Source Patient Position - Orthostatic VS BP Location FiO2 (%)   01/15/24 1308 01/15/24 1308 01/15/24 1308 01/15/24 1308 --   Temporal Monitor Sitting Left arm       Pain Score       01/16/24 1300       No Pain           Vitals:    01/16/24 0703 01/16/24 1338 01/16/24 1338 01/16/24 1505   BP: 121/61 111/87 111/87 114/83   Pulse: 70 66 65 71   Patient Position - Orthostatic VS:             Visual Acuity      ED Medications  Medications   cefepime (MAXIPIME) IVPB (premix in dextrose) 2,000 mg 50 mL (2,000 mg Intravenous New Bag 1/16/24 1937)   allopurinol (ZYLOPRIM) tablet 100 mg (100 mg Oral Given 1/16/24 0826)   docusate sodium (COLACE) capsule 100 mg (100 mg Oral Not Given 1/16/24 0825)   DULoxetine (CYMBALTA) delayed release capsule 20 mg (20 mg Oral Given 1/16/24 0826)   gabapentin (NEURONTIN) capsule 100 mg (100 mg Oral Given 1/16/24 1757)   levothyroxine tablet 125 mcg (125 mcg Oral Given 1/16/24 0549)   midodrine (PROAMATINE) tablet 2.5 mg (2.5 mg Oral Given 1/16/24 0340)    nystatin (MYCOSTATIN) powder ( Topical Given 1/16/24 1800)   vancomycin (VANCOCIN) IVPB (premix in dextrose) 750 mg 150 mL (0 mg Intravenous Stopped 1/16/24 0951)   acetaminophen (TYLENOL) tablet 650 mg (has no administration in time range)   ondansetron (ZOFRAN) injection 4 mg (has no administration in time range)   furosemide (LASIX) injection 40 mg (40 mg Intravenous Given 1/16/24 1757)   warfarin (COUMADIN) tablet 4 mg (4 mg Oral Given 1/16/24 1757)   predniSONE tablet 50 mg (50 mg Oral Given 1/15/24 1528)   diphenhydrAMINE (BENADRYL) tablet 25 mg (25 mg Oral Given 1/15/24 1528)   cefepime (MAXIPIME) IVPB (premix in dextrose) 2,000 mg 50 mL (0 mg Intravenous Stopped 1/15/24 1940)   vancomycin (VANCOCIN) 1,750 mg in sodium chloride 0.9 % 500 mL IVPB (0 mg Intravenous Stopped 1/15/24 2110)   iohexol (OMNIPAQUE) 350 MG/ML injection (MULTI-DOSE) 100 mL (100 mL Intravenous Given 1/15/24 1736)       Diagnostic Studies  Results Reviewed       Procedure Component Value Units Date/Time    Protime-INR [[id number]]  (Abnormal) Collected: 01/16/24 0936    Lab Status: Final result Specimen: Blood from Arm, Right Updated: 01/16/24 1009     Protime 16.7 seconds      INR 1.33    RBC Morphology Reflex Test [[id number]] Collected: 01/16/24 0549    Lab Status: Final result Specimen: Blood from Line, Venous Updated: 01/16/24 0802    Manual Differential(PHLEBS Do Not Order) [[id number]]  (Abnormal) Collected: 01/16/24 0549    Lab Status: Final result Specimen: Blood from Line, Venous Updated: 01/16/24 0708     Segmented % 92 %      Bands % 2 %      Lymphocytes % 4 %      Monocytes % 2 %      Eosinophils, % 0 %      Basophils % 0 %      Absolute Neutrophils 14.13 Thousand/uL      Lymphocytes Absolute 0.60 Thousand/uL      Monocytes Absolute 0.30 Thousand/uL      Eosinophils Absolute 0.00 Thousand/uL      Basophils Absolute 0.00 Thousand/uL      Total Counted --     RBC Morphology Present     Platelet Estimate Adequate     Clumped  Platelets Present    CBC and differential [[id number]]  (Abnormal) Collected: 01/16/24 0549    Lab Status: Final result Specimen: Blood from Line, Venous Updated: 01/16/24 0707     WBC 15.03 Thousand/uL      RBC 4.42 Million/uL      Hemoglobin 11.1 g/dL      Hematocrit 37.2 %      MCV 84 fL      MCH 25.1 pg      MCHC 29.8 g/dL      RDW 16.8 %      MPV 8.9 fL      Platelets 402 Thousands/uL     Narrative:      This is an appended report.  These results have been appended to a previously verified report.    Basic metabolic panel [[id number]]  (Abnormal) Collected: 01/16/24 0549    Lab Status: Final result Specimen: Blood from Line, Venous Updated: 01/16/24 0613     Sodium 138 mmol/L      Potassium 4.2 mmol/L      Chloride 103 mmol/L      CO2 30 mmol/L      ANION GAP 5 mmol/L      BUN 37 mg/dL      Creatinine 0.98 mg/dL      Glucose 137 mg/dL      Calcium 8.2 mg/dL      eGFR 57 ml/min/1.73sq m     Narrative:      National Kidney Disease Foundation guidelines for Chronic Kidney Disease (CKD):     Stage 1 with normal or high GFR (GFR > 90 mL/min/1.73 square meters)    Stage 2 Mild CKD (GFR = 60-89 mL/min/1.73 square meters)    Stage 3A Moderate CKD (GFR = 45-59 mL/min/1.73 square meters)    Stage 3B Moderate CKD (GFR = 30-44 mL/min/1.73 square meters)    Stage 4 Severe CKD (GFR = 15-29 mL/min/1.73 square meters)    Stage 5 End Stage CKD (GFR <15 mL/min/1.73 square meters)  Note: GFR calculation is accurate only with a steady state creatinine    UA w Reflex to Microscopic w Reflex to Culture [[id number]]  (Normal) Collected: 01/15/24 7392    Lab Status: Final result Specimen: Urine, Clean Catch Updated: 01/16/24 0003     Color, UA Yellow     Clarity, UA Clear     Specific Gravity, UA 1.010     pH, UA 7.0     Leukocytes, UA Negative     Nitrite, UA Negative     Protein, UA Negative mg/dl      Glucose, UA Negative mg/dl      Ketones, UA Negative mg/dl      Urobilinogen, UA 0.2 E.U./dl      Bilirubin, UA Negative     Occult  Blood, UA Negative    Blood culture #1 [[id number]] Collected: 01/15/24 1514    Lab Status: Preliminary result Specimen: Blood from Arm, Right Updated: 01/15/24 2201     Blood Culture Received in Microbiology Lab. Culture in Progress.    Blood culture #2 [[id number]] Collected: 01/15/24 1514    Lab Status: Preliminary result Specimen: Blood from Arm, Left Updated: 01/15/24 2201     Blood Culture Received in Microbiology Lab. Culture in Progress.    HS Troponin I 2hr [[id number]]  (Normal) Collected: 01/15/24 1911    Lab Status: Final result Specimen: Blood from Arm, Right Updated: 01/15/24 1938     hs TnI 2hr 7 ng/L      Delta 2hr hsTnI 0 ng/L     Procalcitonin [[id number]]  (Normal) Collected: 01/15/24 1514    Lab Status: Final result Specimen: Blood from Arm, Right Updated: 01/15/24 1547     Procalcitonin <0.05 ng/ml     HS Troponin 0hr (reflex protocol) [[id number]]  (Normal) Collected: 01/15/24 1514    Lab Status: Final result Specimen: Blood from Arm, Right Updated: 01/15/24 1546     hs TnI 0hr 7 ng/L     Comprehensive metabolic panel [[id number]]  (Abnormal) Collected: 01/15/24 1514    Lab Status: Final result Specimen: Blood from Arm, Right Updated: 01/15/24 1539     Sodium 138 mmol/L      Potassium 4.1 mmol/L      Chloride 99 mmol/L      CO2 32 mmol/L      ANION GAP 7 mmol/L      BUN 40 mg/dL      Creatinine 1.12 mg/dL      Glucose 108 mg/dL      Calcium 9.2 mg/dL      Corrected Calcium 9.9 mg/dL      AST 15 U/L      ALT 9 U/L      Alkaline Phosphatase 110 U/L      Total Protein 8.1 g/dL      Albumin 3.1 g/dL      Total Bilirubin 0.60 mg/dL      eGFR 49 ml/min/1.73sq m     Narrative:      National Kidney Disease Foundation guidelines for Chronic Kidney Disease (CKD):     Stage 1 with normal or high GFR (GFR > 90 mL/min/1.73 square meters)    Stage 2 Mild CKD (GFR = 60-89 mL/min/1.73 square meters)    Stage 3A Moderate CKD (GFR = 45-59 mL/min/1.73 square meters)    Stage 3B Moderate CKD (GFR = 30-44 mL/min/1.73  square meters)    Stage 4 Severe CKD (GFR = 15-29 mL/min/1.73 square meters)    Stage 5 End Stage CKD (GFR <15 mL/min/1.73 square meters)  Note: GFR calculation is accurate only with a steady state creatinine    Magnesium [[id number]]  (Normal) Collected: 01/15/24 1514    Lab Status: Final result Specimen: Blood from Arm, Right Updated: 01/15/24 1539     Magnesium 2.2 mg/dL     Lactic acid [[id number]]  (Normal) Collected: 01/15/24 1514    Lab Status: Final result Specimen: Blood from Arm, Right Updated: 01/15/24 1537     LACTIC ACID 0.8 mmol/L     Narrative:      Result may be elevated if tourniquet was used during collection.    Protime-INR [[id number]]  (Abnormal) Collected: 01/15/24 1514    Lab Status: Final result Specimen: Blood from Arm, Right Updated: 01/15/24 1535     Protime 16.1 seconds      INR 1.29    APTT [[id number]]  (Normal) Collected: 01/15/24 1514    Lab Status: Final result Specimen: Blood from Arm, Right Updated: 01/15/24 1535     PTT 31 seconds     CBC and differential [[id number]]  (Abnormal) Collected: 01/15/24 1514    Lab Status: Final result Specimen: Blood from Arm, Right Updated: 01/15/24 1525     WBC 12.13 Thousand/uL      RBC 4.74 Million/uL      Hemoglobin 11.9 g/dL      Hematocrit 40.8 %      MCV 86 fL      MCH 25.1 pg      MCHC 29.2 g/dL      RDW 16.8 %      MPV 8.6 fL      Platelets 400 Thousands/uL      nRBC 0 /100 WBCs      Neutrophils Relative 82 %      Immat GRANS % 0 %      Lymphocytes Relative 11 %      Monocytes Relative 5 %      Eosinophils Relative 2 %      Basophils Relative 0 %      Neutrophils Absolute 9.91 Thousands/µL      Immature Grans Absolute 0.05 Thousand/uL      Lymphocytes Absolute 1.30 Thousands/µL      Monocytes Absolute 0.57 Thousand/µL      Eosinophils Absolute 0.25 Thousand/µL      Basophils Absolute 0.05 Thousands/µL                    XR chest 1 view portable   Final Result by Deyvi Rahman MD (01/16 1136)      No acute cardiopulmonary disease.           "        Resident: JEAN PAUL KRAMER I, the attending radiologist, have reviewed the images and agree with the final report above.      Workstation performed: [id number]         CT abdomen pelvis with contrast   Final Result by Leatha Dia MD (01/15 1929)      No acute obstructive or inflammatory bowel abnormality identified.      Stable nonobstructing nephrolithiasis. No hydronephrosis or ureterolithiasis. Renal atrophy, left greater than right appearing stable.      Marked degenerative changes throughout the spine as well as both hip joints, especially on the right where there is marked remodeling and resorptive changes of the acetabulum and femoral head.      Indeterminate 1 cm rounded hypodensity in the caudal tip of the spleen.            Workstation performed: [id number]         CT soft tissue neck with contrast   Final Result by Gerry Dunlap MD (01/15 1756)      No acute neck abnormality given suboptimal evaluation due to patient body habitus and photon starvation artifact.      Calcified tonsilloliths in right palatine tonsil, likely sequela of chronic tonsillitis.      Additional chronic/incidental findings as detailed above.                  Workstation performed: [id number]                    Procedures  Procedures         ED Course  ED Course as of 01/16/24 2022   Mon Pramod 15, 2024   1901 Reassessed patient upon her return with no lip swelling, no stridor, no wheeze, no difficulty breathing. Patient reports tingling sensation and throat \"scratchy\" sensation improving.              HEART Risk Score      Flowsheet Row Most Recent Value   Heart Score Risk Calculator    History 0 Filed at: 01/15/2024 2005   ECG 1 Filed at: 01/15/2024 2005   Age 2 Filed at: 01/15/2024 2005   Risk Factors 1 Filed at: 01/15/2024 2005   Troponin 0 Filed at: 01/15/2024 2005   HEART Score 4 Filed at: 01/15/2024 2005                          SBIRT 20yo+      Flowsheet Row Most Recent Value "   Initial Alcohol Screen: US AUDIT-C     1. How often do you have a drink containing alcohol? 0 Filed at: 01/16/2024 0952   2. How many drinks containing alcohol do you have on a typical day you are drinking?  0 Filed at: 01/16/2024 0952   3a. Male UNDER 65: How often do you have five or more drinks on one occasion? 0 Filed at: 01/16/2024 0952   3b. FEMALE Any Age, or MALE 65+: How often do you have 4 or more drinks on one occassion? 0 Filed at: 01/16/2024 0952   Audit-C Score 0 Filed at: 01/16/2024 0952   JYOTI: How many times in the past year have you...    Used an illegal drug or used a prescription medication for non-medical reasons? Never Filed at: 01/16/2024 0952                      Medical Decision Making  DDx: medication reaction, acs, dysrhythmia, small bowel obstruction, cellulitis, parotid abscess  Patient arrives with multiple complaints including a reaction to occasion, worsening cellulitis, chest pain, abdominal pain, left-sided face and neck swelling.  Medication reactions: Patient reported that she was started on Cipro for cellulitis 2 days ago, noted that she was having symptoms of a scratchy throat and lip tingling last night.  Patient states that she took Benadryl but her symptoms persisted.  Patient had no further Cipro today.  There is no concern for anaphylaxis as patient has no swelling in tongue, throat, lips. Patient states she feels as though her lips are tingling and throat is scratchy similar to prior allergic reactions. Patient has no stridor, no wheeze, no difficulty speaking, no change in phonation. Patient reports she feels the left side of her face is swollen near her jaw. Limited evaluation due to body habitus, however, face is symmetrical. No swelling to hard or soft palate. Will provide benadryl and prednisone. Patient reassessed when returned and reports her symptoms are improving. No change to exam.   Regarding patient's facial swelling, although there is no obvious swelling,  "will check CT soft tissue neck for reassurance due to a limited exam secondary to patient's body habitus.  No acute findings on this exam.  Chest pain: Will department patient states that she started experiencing chest tightness without any shortness of breath or difficulty breathing.  Will check EKG, and check troponin.  Patient has no acute findings on his workup.  Patient has a heart score of 4.  ACS rule out as cause of chest pain.  Patient reports chest tightness has resolved.  Patient denies any associated shortness of breath or difficulty breathing.  Abdominal pain: Patient reporting for the past 10 days she has been experiencing abdominal pain when she rolls to her left side.  Patient's exam limited by her body habitus.  Patient is tender in the left side of her abdomen.  Due to patient's body habitus requires a EMS transport to [facility] where she received a CT scan.  Patient has no acute findings on the CT scan.  Discussed with patient that she has nonobstructing nephrolithiasis no hydronephrosis.  Discussed generative changes noted.  Cellulitis: Patient reports that her lymphedema on the right side of her abdomen she terms as \"Abhishek,\" is more painful and has had increasing eye discharge and bleeding.  Patient does have erythema to this area that she reports started on the aforementioned Cipro.  Patient reports that that her symptoms are worsening even with the Cipro.  Will treat patient as an outpatient failure, patient discussed that this occasionally will happen and she requires cefepime and vancomycin.  Patient has no lymphangitic streaking, no crepitus, no pain out of proportion, no evidence of necrotizing fasciitis at this time.   Blood work: Noted to have a leukocytosis today.  No anemia.  Patient has a subtherapeutic warfarin level.  Patient has no elevated lactic acidosis, elevated magnesium.  Patient has no LARRY, or electrolyte abnormality.  Patient has no elevated procalcitonin. Glucose " wnl.   Due to patient reporting her cellulitis is worsening, and failure to outpatient PO antibiotics. Patient to be admitted to [facility] after speaking to VIRGINIE Hebert.       Amount and/or Complexity of Data Reviewed  Labs: ordered.  Radiology: ordered.    Risk  OTC drugs.  Prescription drug management.  Decision regarding hospitalization.             Disposition  Final diagnoses:   Cellulitis   Lymphedema   Adverse effect of drug, initial encounter   Abdominal pain   Chest pain     Time reflects when diagnosis was documented in both MDM as applicable and the Disposition within this note       Time User Action Codes Description Comment    1/15/2024  7:48 PM Anup Hammer Add [L03.311] Cellulitis of abdominal wall     1/15/2024  7:55 PM Susan Winslow [L03.90] Cellulitis     1/15/2024  7:55 PM Susan Winslow [I89.0] Lymphedema     1/15/2024  7:55 PM Susan Winslow [T50.905A] Adverse effect of drug, initial encounter     1/15/2024  7:55 PM Susan Winslow Add [R10.9] Abdominal pain     1/15/2024  7:55 PM Susan Winslow [R07.9] Chest pain           ED Disposition       ED Disposition   Admit    Condition   Stable    Date/Time   Mon Pramod 15, 2024 1954    Comment   Case was discussed with MYA Hebert and the patient's admission status was agreed to be Admission Status: inpatient status to the service of Dr. Verdugo .               Follow-up Information    None         Current Discharge Medication List        CONTINUE these medications which have NOT CHANGED    Details   acetaminophen (TYLENOL) 650 mg CR tablet Take 650 mg by mouth every 8 (eight) hours as needed (for osteoarthritis)      allopurinol (ZYLOPRIM) 100 mg tablet Take 1 tablet (100 mg total) by mouth daily  Qty: 30 tablet, Refills: 5    Associated Diagnoses: Chronic gout without tophus, unspecified cause, unspecified site      DULoxetine (CYMBALTA) 20 mg capsule Take 1 capsule (20 mg total) by mouth daily  Qty: 30 capsule, Refills: 5     Associated Diagnoses: Depression, unspecified depression type      furosemide (LASIX) 40 mg tablet Take 1.5 tablets (60 mg total) by mouth daily  Qty: 45 tablet, Refills: 0    Associated Diagnoses: Chronic diastolic congestive heart failure (HCC)      gabapentin (NEURONTIN) 100 mg capsule Take 1 capsule (100 mg total) by mouth 3 (three) times a day  Qty: 90 capsule, Refills: 0    Associated Diagnoses: Herpes zoster without complication      levothyroxine 125 mcg tablet Take 125 mcg by mouth daily      midodrine (PROAMATINE) 2.5 mg tablet Take 1 tablet (2.5 mg total) by mouth 3 (three) times a day before meals  Qty: 90 tablet, Refills: 0    Associated Diagnoses: Chronic diastolic congestive heart failure (HCC)      nystatin (MYCOSTATIN) powder Apply topically 2 (two) times a day  Qty: 30 g, Refills: 0    Associated Diagnoses: Panniculitis; Class 3 severe obesity due to excess calories with serious comorbidity and body mass index (BMI) greater than or equal to 70 in adult (HCC)      triamcinolone (KENALOG) 0.1 % cream Apply 0.1 application topically 2 (two) times a day      !! warfarin (COUMADIN) 2 mg tablet Take 2 mg by mouth once daily on Sunday, Monday, Wednesday, Friday, and Saturday  Qty: 20 tablet, Refills: 0    Associated Diagnoses: Chronic atrial fibrillation (HCC)      docusate sodium (COLACE) 100 mg capsule Take 1 capsule (100 mg total) by mouth daily Do not start before August 29, 2023.  Qty: 30 capsule, Refills: 0    Associated Diagnoses: Irritable bowel syndrome      !! warfarin (COUMADIN) 2 mg tablet Take 4 mg once daily on Tuesday and Thursday Do not start before August 29, 2023.  Qty: 20 tablet, Refills: 0    Associated Diagnoses: Chronic atrial fibrillation (HCC)       !! - Potential duplicate medications found. Please discuss with provider.          No discharge procedures on file.    PDMP Review         Value Time User    PDMP Reviewed  Yes 5/27/2022 11:10 PM Anjelica Dey PA-C             ED Provider  Electronically Signed by             ORACIO Molina  01/16/24 2022

## 2024-01-16 LAB
ANION GAP SERPL CALCULATED.3IONS-SCNC: 5 MMOL/L
ATRIAL RATE: 78 BPM
BASOPHILS # BLD MANUAL: 0 THOUSAND/UL (ref 0–0.1)
BASOPHILS NFR MAR MANUAL: 0 % (ref 0–1)
BILIRUB UR QL STRIP: NEGATIVE
BUN SERPL-MCNC: 37 MG/DL (ref 5–25)
CALCIUM SERPL-MCNC: 8.2 MG/DL (ref 8.4–10.2)
CHLORIDE SERPL-SCNC: 103 MMOL/L (ref 96–108)
CLARITY UR: CLEAR
CO2 SERPL-SCNC: 30 MMOL/L (ref 21–32)
COLOR UR: YELLOW
CREAT SERPL-MCNC: 0.98 MG/DL (ref 0.6–1.3)
EOSINOPHIL # BLD MANUAL: 0 THOUSAND/UL (ref 0–0.4)
EOSINOPHIL NFR BLD MANUAL: 0 % (ref 0–6)
ERYTHROCYTE [DISTWIDTH] IN BLOOD BY AUTOMATED COUNT: 16.8 % (ref 11.6–15.1)
GFR SERPL CREATININE-BSD FRML MDRD: 57 ML/MIN/1.73SQ M
GLUCOSE SERPL-MCNC: 137 MG/DL (ref 65–140)
GLUCOSE UR STRIP-MCNC: NEGATIVE MG/DL
HCT VFR BLD AUTO: 37.2 % (ref 34.8–46.1)
HGB BLD-MCNC: 11.1 G/DL (ref 11.5–15.4)
HGB UR QL STRIP.AUTO: NEGATIVE
INR PPP: 1.33 (ref 0.84–1.19)
KETONES UR STRIP-MCNC: NEGATIVE MG/DL
LEUKOCYTE ESTERASE UR QL STRIP: NEGATIVE
LYMPHOCYTES # BLD AUTO: 0.6 THOUSAND/UL (ref 0.6–4.47)
LYMPHOCYTES # BLD AUTO: 4 % (ref 14–44)
MCH RBC QN AUTO: 25.1 PG (ref 26.8–34.3)
MCHC RBC AUTO-ENTMCNC: 29.8 G/DL (ref 31.4–37.4)
MCV RBC AUTO: 84 FL (ref 82–98)
MONOCYTES # BLD AUTO: 0.3 THOUSAND/UL (ref 0–1.22)
MONOCYTES NFR BLD: 2 % (ref 4–12)
NEUTROPHILS # BLD MANUAL: 14.13 THOUSAND/UL (ref 1.85–7.62)
NEUTS BAND NFR BLD MANUAL: 2 % (ref 0–8)
NEUTS SEG NFR BLD AUTO: 92 % (ref 43–75)
NITRITE UR QL STRIP: NEGATIVE
PH UR STRIP.AUTO: 7 [PH]
PLATELET # BLD AUTO: 402 THOUSANDS/UL (ref 149–390)
PLATELET BLD QL SMEAR: ADEQUATE
PLATELET CLUMP BLD QL SMEAR: PRESENT
PMV BLD AUTO: 8.9 FL (ref 8.9–12.7)
POTASSIUM SERPL-SCNC: 4.2 MMOL/L (ref 3.5–5.3)
PROT UR STRIP-MCNC: NEGATIVE MG/DL
PROTHROMBIN TIME: 16.7 SECONDS (ref 11.6–14.5)
QRS AXIS: -22 DEGREES
QRSD INTERVAL: 82 MS
QT INTERVAL: 404 MS
QTC INTERVAL: 426 MS
RBC # BLD AUTO: 4.42 MILLION/UL (ref 3.81–5.12)
RBC MORPH BLD: PRESENT
SODIUM SERPL-SCNC: 138 MMOL/L (ref 135–147)
SP GR UR STRIP.AUTO: 1.01
T WAVE AXIS: 114 DEGREES
UROBILINOGEN UR QL STRIP.AUTO: 0.2 E.U./DL
VENTRICULAR RATE: 67 BPM
WBC # BLD AUTO: 15.03 THOUSAND/UL (ref 4.31–10.16)

## 2024-01-16 PROCEDURE — 87186 SC STD MICRODIL/AGAR DIL: CPT | Performed by: STUDENT IN AN ORGANIZED HEALTH CARE EDUCATION/TRAINING PROGRAM

## 2024-01-16 PROCEDURE — 87205 SMEAR GRAM STAIN: CPT | Performed by: STUDENT IN AN ORGANIZED HEALTH CARE EDUCATION/TRAINING PROGRAM

## 2024-01-16 PROCEDURE — 85610 PROTHROMBIN TIME: CPT | Performed by: STUDENT IN AN ORGANIZED HEALTH CARE EDUCATION/TRAINING PROGRAM

## 2024-01-16 PROCEDURE — 87070 CULTURE OTHR SPECIMN AEROBIC: CPT | Performed by: STUDENT IN AN ORGANIZED HEALTH CARE EDUCATION/TRAINING PROGRAM

## 2024-01-16 PROCEDURE — 99232 SBSQ HOSP IP/OBS MODERATE 35: CPT | Performed by: STUDENT IN AN ORGANIZED HEALTH CARE EDUCATION/TRAINING PROGRAM

## 2024-01-16 PROCEDURE — 87077 CULTURE AEROBIC IDENTIFY: CPT | Performed by: STUDENT IN AN ORGANIZED HEALTH CARE EDUCATION/TRAINING PROGRAM

## 2024-01-16 PROCEDURE — 87147 CULTURE TYPE IMMUNOLOGIC: CPT | Performed by: STUDENT IN AN ORGANIZED HEALTH CARE EDUCATION/TRAINING PROGRAM

## 2024-01-16 PROCEDURE — 36415 COLL VENOUS BLD VENIPUNCTURE: CPT | Performed by: PHYSICIAN ASSISTANT

## 2024-01-16 PROCEDURE — 80048 BASIC METABOLIC PNL TOTAL CA: CPT | Performed by: PHYSICIAN ASSISTANT

## 2024-01-16 PROCEDURE — 85027 COMPLETE CBC AUTOMATED: CPT | Performed by: PHYSICIAN ASSISTANT

## 2024-01-16 PROCEDURE — 85007 BL SMEAR W/DIFF WBC COUNT: CPT | Performed by: PHYSICIAN ASSISTANT

## 2024-01-16 RX ORDER — WARFARIN SODIUM 2 MG/1
4 TABLET ORAL
Status: DISCONTINUED | OUTPATIENT
Start: 2024-01-16 | End: 2024-01-18

## 2024-01-16 RX ADMIN — GABAPENTIN 100 MG: 100 CAPSULE ORAL at 21:15

## 2024-01-16 RX ADMIN — NYSTATIN: 100000 POWDER TOPICAL at 18:00

## 2024-01-16 RX ADMIN — MIDODRINE HYDROCHLORIDE 2.5 MG: 5 TABLET ORAL at 17:57

## 2024-01-16 RX ADMIN — CEFEPIME HYDROCHLORIDE 2000 MG: 2 INJECTION, SOLUTION INTRAVENOUS at 07:06

## 2024-01-16 RX ADMIN — GABAPENTIN 100 MG: 100 CAPSULE ORAL at 17:57

## 2024-01-16 RX ADMIN — GABAPENTIN 100 MG: 100 CAPSULE ORAL at 08:26

## 2024-01-16 RX ADMIN — WARFARIN SODIUM 4 MG: 2 TABLET ORAL at 17:57

## 2024-01-16 RX ADMIN — NYSTATIN: 100000 POWDER TOPICAL at 08:27

## 2024-01-16 RX ADMIN — VANCOMYCIN HYDROCHLORIDE 750 MG: 750 INJECTION, SOLUTION INTRAVENOUS at 21:15

## 2024-01-16 RX ADMIN — ALLOPURINOL 100 MG: 100 TABLET ORAL at 08:26

## 2024-01-16 RX ADMIN — LEVOTHYROXINE SODIUM 125 MCG: 25 TABLET ORAL at 05:49

## 2024-01-16 RX ADMIN — CEFEPIME HYDROCHLORIDE 2000 MG: 2 INJECTION, SOLUTION INTRAVENOUS at 19:37

## 2024-01-16 RX ADMIN — VANCOMYCIN HYDROCHLORIDE 750 MG: 750 INJECTION, SOLUTION INTRAVENOUS at 08:51

## 2024-01-16 RX ADMIN — FUROSEMIDE 40 MG: 10 INJECTION, SOLUTION INTRAMUSCULAR; INTRAVENOUS at 17:57

## 2024-01-16 RX ADMIN — FUROSEMIDE 40 MG: 10 INJECTION, SOLUTION INTRAMUSCULAR; INTRAVENOUS at 07:03

## 2024-01-16 RX ADMIN — DULOXETINE HYDROCHLORIDE 20 MG: 20 CAPSULE, DELAYED RELEASE ORAL at 08:26

## 2024-01-16 NOTE — ASSESSMENT & PLAN NOTE
Wt Readings from Last 3 Encounters:   09/18/23 (!) 194 kg (426 lb 13 oz)   08/24/23 (!) 200 kg (439 lb 15.9 oz)   08/10/23 (!) 200 kg (441 lb 9.3 oz)     Not in exacerbation at this time  We will give Lasix 40 mg IV twice daily  Placed on no added salt diet  Obtain daily weights

## 2024-01-16 NOTE — H&P
Formerly [facility], [facility]  H&P  Name: Zee Kirk 73 y.o. female I MRN: [id number]  Unit/Bed#: ED 17 I Date of Admission: 1/15/2024   Date of Service: 1/15/2024 I Hospital Day: 0      Assessment/Plan   * Panniculitis  Assessment & Plan  Admit to medicine  Begin vancomycin 750 mg IV every 12 hours with pharmacy to dose and cefepime 2 g IV every 12 hours  Will trend fever curve and procalcitonin  Mild leukocytosis of 12.13  Initial procalcitonin less than 0.05  Patient had similar presentation last admission in September 2023  Was seen and evaluated by ID during that admission  Feels that this did not represent actual infection at that time and symptoms were controlled with IV diuresis  Will change Lasix to 40 mg IV twice daily    Chronic atrial fibrillation (HCC)  Assessment & Plan  Patient is rate controlled at this time  We will continue prehospital Coumadin 2 mg p.o. daily except for Tuesday and Thursday which will be 4 mg    Adjustment disorder  Assessment & Plan  Continue prehospital Cymbalta 20 mg p.o. daily    Chronic diastolic congestive heart failure (HCC)  Assessment & Plan  Wt Readings from Last 3 Encounters:   09/18/23 (!) 194 kg (426 lb 13 oz)   08/24/23 (!) 200 kg (439 lb 15.9 oz)   08/10/23 (!) 200 kg (441 lb 9.3 oz)     Not in exacerbation at this time  We will give Lasix 40 mg IV twice daily  Placed on no added salt diet  Obtain daily weights    Other specified hypothyroidism  Assessment & Plan  Continue prehospital levothyroxine 125 mcg p.o. daily         VTE Prophylaxis: Warfarin (Coumadin)  Code Status: Level 1  Discussion with family: None present at bedside at time of exam    Anticipated Length of Stay:  Patient will be admitted on an Inpatient basis with an anticipated length of stay of  > 2 midnights.   Justification for Hospital Stay: Panniculitis requiring IV antibiotics    Total Time for Visit, including Counseling / Coordination of Care: 1 hour.  Greater than 50% of  this total time spent on direct patient counseling and coordination of care.    Chief Complaint:   Right pannus drainage x 1 week    History of Present Illness:    Zee Kirk is a 73 y.o. female who presents with right pannus drainage x 1 week.  Patient with longstanding history of edema and recurrent cellulitis presents ER for further evaluation and treatment of 1 week history of right-sided this redness and weeping.  Patient was started on a course of Cipro by her primary care provider she states that she took 4 doses of the she noted some tingling sensation in her lips and tongue felt that her tongue was slightly swollen.    Additionally patient is complaining of some abdominal pain she rolls onto her left side.  Patient states that this has been ongoing for the past 10 days ET imaging is been unrevealing.    Review of Systems:    Review of Systems   Constitutional:  Negative for chills and fever.   HENT:  Negative for congestion and sore throat.    Respiratory:  Negative for cough, shortness of breath and wheezing.    Cardiovascular:  Negative for chest pain and palpitations.   Gastrointestinal:  Positive for abdominal pain. Negative for diarrhea, nausea and vomiting.   Genitourinary:  Negative for dysuria, frequency, hematuria and urgency.   Musculoskeletal:  Negative for arthralgias and myalgias.   Skin:  Positive for color change and wound.   Neurological:  Negative for dizziness, syncope, light-headedness and headaches.   All other systems reviewed and are negative.      Past Medical and Surgical History:     Past Medical History:   Diagnosis Date    Atrial fibrillation (HCC)     Bladder stone     C. difficile colitis     Cellulitis     CHF (congestive heart failure) (HCC)     Depression with anxiety     Disease of thyroid gland     Diverticulitis     Enterocolitis     History of abnormal cervical Pap smear     Kidney stone     Lymphedema     Menopause     Age 50    Morbid obesity with BMI of 70  and over, adult (HCC)     MRSA (methicillin resistant Staphylococcus aureus)     abdominal wound    Osteoarthritis     Phlebitis     left lower leg    Spondylolysis        Past Surgical History:   Procedure Laterality Date    APPENDECTOMY      CARPAL TUNNEL RELEASE      CHOLECYSTECTOMY      CYSTOSCOPY      stent    FOOT SURGERY  1982    bone spur    KNEE SURGERY      WISDOM TOOTH EXTRACTION         Meds/Allergies:    Prior to Admission medications    Medication Sig Start Date End Date Taking? Authorizing Provider   acetaminophen (TYLENOL) 650 mg CR tablet Take 650 mg by mouth every 8 (eight) hours as needed (for osteoarthritis)    Historical Provider, MD   allopurinol (ZYLOPRIM) 100 mg tablet Take 1 tablet (100 mg total) by mouth daily 9/30/19   Davon Lopez DO   docusate sodium (COLACE) 100 mg capsule Take 1 capsule (100 mg total) by mouth daily Do not start before August 29, 2023. 8/29/23   ORACIO Gardner   DULoxetine (CYMBALTA) 20 mg capsule Take 1 capsule (20 mg total) by mouth daily 8/15/18   Davon Lopez DO   furosemide (LASIX) 40 mg tablet Take 1.5 tablets (60 mg total) by mouth daily 9/18/23 10/18/23  Kishan Jones PA-C   gabapentin (NEURONTIN) 100 mg capsule Take 1 capsule (100 mg total) by mouth 3 (three) times a day 8/10/23 9/9/23  ORACIO Last   levothyroxine 125 mcg tablet Take 125 mcg by mouth daily    Historical Provider, MD   midodrine (PROAMATINE) 2.5 mg tablet Take 1 tablet (2.5 mg total) by mouth 3 (three) times a day before meals 8/28/23 9/27/23  ORACIO Gardner   nystatin (MYCOSTATIN) powder Apply topically 2 (two) times a day 9/18/23   Kishan Jones PA-C   triamcinolone (KENALOG) 0.1 % cream Apply 0.1 application topically 2 (two) times a day 12/16/21   Historical Provider, MD   warfarin (COUMADIN) 2 mg tablet Take 2 mg by mouth once daily on Sunday, Monday, Wednesday, Friday, and Saturday 8/28/23   ORACIO Gardner   warfarin (COUMADIN) 2 mg tablet Take 4  mg once daily on Tuesday and Thursday Do not start before 2023. 23   ORACIO Gardner     all medications and allergies reviewed    Allergies:   Allergies   Allergen Reactions    Augmentin Es-600 [Amoxicillin-Pot Clavulanate] Anaphylaxis and Rash    Bactrim [Sulfamethoxazole-Trimethoprim] Anaphylaxis    Cefazolin Itching and Other (See Comments)     Patient developed itching and difficulty swallowing following IV cefazolin administration at [facility] which required treatment with benadryl and epinephrine - has tolerated other cephalosporins with different side chains including cefepime, cefuroxime, and cephalexin    Keflex [Cephalexin] Anaphylaxis    Penicillins Anaphylaxis, Rash and Other (See Comments)     Tolerates cefepime (21)    Other Rash, Irritability and Edema     Patient reports significant reaction to the use of Max Orb in the abdominal folds leading to swelling, excoriation, maceration and weeping of the skin.     Omeprazole GI Intolerance    Sulfa Antibiotics Hives    Latex Rash and Edema    Vitamin C [Ascorbate - Food Allergy] Rash       Social History:     Marital Status: Single   Occupation: Retired  Patient Pre-hospital Living Situation: Home with caregiver  Patient Pre-hospital Level of Mobility: Bedbound  Patient Pre-hospital Diet Restrictions: No added salt    Social History     Substance and Sexual Activity   Alcohol Use Not Currently     Social History     Tobacco Use   Smoking Status Former    Current packs/day: 0.00    Average packs/day: 5.0 packs/day for 3.0 years (15.0 ttl pk-yrs)    Types: Cigarettes    Start date: 1967    Quit date: 1970    Years since quittin.5   Smokeless Tobacco Never   Tobacco Comments    5 packs/day until  (age 17-19) - As per [employer]      Social History     Substance and Sexual Activity   Drug Use Not Currently    Types: Marijuana    Comment: As a teen - As per [employer]        Family History:  I have reviewed the  patient's family history    Physical Exam:     Vitals:   Blood Pressure: 148/59 (01/15/24 2230)  Pulse: 74 (01/15/24 2230)  Temperature: 98.5 °F (36.9 °C) (01/15/24 1308)  Temp Source: Temporal (01/15/24 1308)  Respirations: 18 (01/15/24 2230)  SpO2: 91 % (01/15/24 2230)    Physical Exam  Vitals and nursing note reviewed.   Constitutional:       General: She is not in acute distress.     Appearance: Normal appearance. She is obese.   HENT:      Head: Normocephalic and atraumatic.      Right Ear: Tympanic membrane normal.      Left Ear: Tympanic membrane normal.      Nose: Nose normal.      Mouth/Throat:      Mouth: Mucous membranes are moist.      Pharynx: Oropharynx is clear. No posterior oropharyngeal erythema.   Eyes:      Extraocular Movements: Extraocular movements intact.      Conjunctiva/sclera: Conjunctivae normal.      Pupils: Pupils are equal, round, and reactive to light.   Cardiovascular:      Rate and Rhythm: Normal rate and regular rhythm.      Pulses: Normal pulses.      Heart sounds: Normal heart sounds. No murmur heard.  Pulmonary:      Effort: Pulmonary effort is normal. No respiratory distress.      Breath sounds: Normal breath sounds. No rhonchi or rales.   Abdominal:      General: Bowel sounds are normal.      Palpations: Abdomen is soft.      Tenderness: There is no abdominal tenderness.      Comments: Right-sided panniculitis with erythema and serosanguineous drainage   Musculoskeletal:      Cervical back: Normal range of motion and neck supple. No muscular tenderness.      Right lower leg: Edema present.      Left lower leg: Edema present.   Skin:     General: Skin is warm and dry.      Capillary Refill: Capillary refill takes less than 2 seconds.   Neurological:      General: No focal deficit present.      Mental Status: She is alert and oriented to person, place, and time.         Additional Data:     Lab Results: I have personally reviewed pertinent reports.      Results from last 7 days    Lab Units 01/15/24  1514   WBC Thousand/uL 12.13*   HEMOGLOBIN g/dL 11.9   HEMATOCRIT % 40.8   PLATELETS Thousands/uL 400*   NEUTROS PCT % 82*   LYMPHS PCT % 11*   MONOS PCT % 5   EOS PCT % 2     Results from last 7 days   Lab Units 01/15/24  1514   SODIUM mmol/L 138   POTASSIUM mmol/L 4.1   CHLORIDE mmol/L 99   CO2 mmol/L 32   BUN mg/dL 40*   CREATININE mg/dL 1.12   ANION GAP mmol/L 7   CALCIUM mg/dL 9.2   ALBUMIN g/dL 3.1*   TOTAL BILIRUBIN mg/dL 0.60   ALK PHOS U/L 110*   ALT U/L 9   AST U/L 15   GLUCOSE RANDOM mg/dL 108     Results from last 7 days   Lab Units 01/15/24  1514   INR  1.29*             Results from last 7 days   Lab Units 01/15/24  1514   LACTIC ACID mmol/L 0.8   PROCALCITONIN ng/ml <0.05       Imaging: I have personally reviewed pertinent reports.    CT abdomen pelvis with contrast   Final Result by Leatha Dia MD (01/15 1929)      No acute obstructive or inflammatory bowel abnormality identified.      Stable nonobstructing nephrolithiasis. No hydronephrosis or ureterolithiasis. Renal atrophy, left greater than right appearing stable.      Marked degenerative changes throughout the spine as well as both hip joints, especially on the right where there is marked remodeling and resorptive changes of the acetabulum and femoral head.      Indeterminate 1 cm rounded hypodensity in the caudal tip of the spleen.            Workstation performed: [id number]         CT soft tissue neck with contrast   Final Result by Gerry Dunlap MD (01/15 1756)      No acute neck abnormality given suboptimal evaluation due to patient body habitus and photon starvation artifact.      Calcified tonsilloliths in right palatine tonsil, likely sequela of chronic tonsillitis.      Additional chronic/incidental findings as detailed above.                  Workstation performed: [id number]         XR chest 1 view portable    (Results Pending)         EKG, Pathology, and Other Studies Reviewed on  Admission:   EKG: N/A    Epic / Care Everywhere Records Reviewed: Yes    ** Please Note: This note has been constructed using a voice recognition system. **     3

## 2024-01-16 NOTE — PROGRESS NOTES
Zee Kirk is a 73 y.o. female who is currently ordered Vancomycin IV with management by the Pharmacy Consult service.  Relevant clinical data and objective / subjective history reviewed.  Vancomycin Assessment:  Indication and Goal AUC/Trough: Soft tissue (goal -600, trough >10), -600, trough >10  Clinical Status: stable  Micro:   pending  Renal Function:  SCr: 0.98 mg/dL  CrCl: 90.1 mL/min  Renal replacement: Not on dialysis  Days of Therapy: 2  Current Dose: 750 mg q12  Vancomycin Plan:  New Dosing: continue 750 mg q12  Estimated AUC: 440 mcg*hr/mL  Estimated Trough: 13.4 mcg/mL  Next Level: 1/17 6am  Renal Function Monitoring: Daily BMP and UOP  Pharmacy will continue to follow closely for s/sx of nephrotoxicity, infusion reactions and appropriateness of therapy.  BMP and CBC will be ordered per protocol. We will continue to follow the patient’s culture results and clinical progress daily.    Thomas Westfall, Pharmacist

## 2024-01-16 NOTE — PROGRESS NOTES
Zee Kirk is a 73 y.o. female who is currently ordered Vancomycin IV with management by the Pharmacy Consult service.  Relevant clinical data and objective / subjective history reviewed.  Vancomycin Assessment:  Indication and Goal AUC/Trough: Soft tissue (goal -600, trough >10)  Clinical Status:  New Start  Micro:     Renal Function:  SCr: 1.12 mg/dL  CrCl: 78.7 mL/min  Renal replacement: Not on dialysis  Days of Therapy: 1  Current Dose: New Start  Vancomycin Plan:  New Dosing: loading dose 1750 mg iv once, then 750 mg iv q 12 hrs  Estimated AUC: 498 mcg*hr/mL  Estimated Trough: 15.8 mcg/mL  Next Level: 1/17/24 @ 0600  Renal Function Monitoring: Daily BMP and UOP  Pharmacy will continue to follow closely for s/sx of nephrotoxicity, infusion reactions and appropriateness of therapy.  BMP and CBC will be ordered per protocol. We will continue to follow the patient’s culture results and clinical progress daily.    Cata Kumar, Pharmacist

## 2024-01-16 NOTE — ASSESSMENT & PLAN NOTE
Admit to medicine  Begin vancomycin 750 mg IV every 12 hours with pharmacy to dose and cefepime 2 g IV every 12 hours  Will trend fever curve and procalcitonin  Mild leukocytosis of 12.13  Initial procalcitonin less than 0.05  Patient had similar presentation last admission in September 2023  Was seen and evaluated by ID during that admission  Feels that this did not represent actual infection at that time and symptoms were controlled with IV diuresis  Will change Lasix to 40 mg IV twice daily

## 2024-01-16 NOTE — PLAN OF CARE
Problem: Prexisting or High Potential for Compromised Skin Integrity  Goal: Skin integrity is maintained or improved  Description: INTERVENTIONS:  - Identify patients at risk for skin breakdown  - Assess and monitor skin integrity  - Assess and monitor nutrition and hydration status  - Monitor labs   - Assess for incontinence   - Turn and reposition patient  - Assist with mobility/ambulation  - Relieve pressure over bony prominences  - Avoid friction and shearing  - Provide appropriate hygiene as needed including keeping skin clean and dry  - Evaluate need for skin moisturizer/barrier cream  - Collaborate with interdisciplinary team   - Patient/family teaching  - Consider wound care consult   Outcome: Not Progressing

## 2024-01-16 NOTE — ASSESSMENT & PLAN NOTE
Wt Readings from Last 3 Encounters:   01/16/24 (!) 194 kg (427 lb 11.1 oz)   09/18/23 (!) 194 kg (426 lb 13 oz)   08/24/23 (!) 200 kg (439 lb 15.9 oz)     Not in exacerbation at this time  Difficult to discern volume status due to body habitus  Continue Lasix 40 mg IV twice daily

## 2024-01-16 NOTE — PROGRESS NOTES
[facility]  Progress Note  Name: Zee Kirk I  MRN: [id number]  Unit/Bed#: -01 I Date of Admission: 1/15/2024   Date of Service: 2024 I Hospital Day: 1    Assessment/Plan   * Panniculitis  Assessment & Plan  Presented with drainage and swelling of her right abdomen  Had prior admission for same complaint with admission back in 2023  Continue vancomycin, cefepime with prior wound cultures growing MRSA and Pseudomonas  Wound care consulted  Blood cultures pending, wound culture/stain ordered  Monitor labs, procalcitonin  Suspect white count likely due to steroids given in the ED with reports of swelling and concern for Cipro reaction    Chronic atrial fibrillation (HCC)  Assessment & Plan  Patient is rate controlled at this time  Continue Coumadin, monitor INR currently subtherapeutic    Adjustment disorder  Assessment & Plan  Continue Cymbalta 20 mg p.o. daily    Chronic diastolic congestive heart failure (HCC)  Assessment & Plan  Wt Readings from Last 3 Encounters:   24 (!) 194 kg (427 lb 11.1 oz)   23 (!) 194 kg (426 lb 13 oz)   23 (!) 200 kg (439 lb 15.9 oz)     Not in exacerbation at this time  Difficult to discern volume status due to body habitus  Continue Lasix 40 mg IV twice daily    Other specified hypothyroidism  Assessment & Plan  Continue levothyroxine 125 mcg p.o. daily             VTE Pharmacologic Prophylaxis:   Pharmacologic: Warfarin (Coumadin)  Mechanical VTE Prophylaxis in Place: Yes    Current Length of Stay: 1 day(s)    Current Patient Status: Inpatient   Certification Statement: The patient will continue to require additional inpatient hospital stay due to IV abx, wound care    Discharge Plan: pending    Code Status: Level 1 - Full Code      Subjective:   Denies any fevers, chills, cp or sob. Tolerating diet.     Objective:     Vitals:   Temp (24hrs), Av.6 °F (37 °C), Min:98.6 °F (37 °C), Max:98.6 °F (37 °C)    Temp:   [98.6 °F (37 °C)] 98.6 °F (37 °C)  HR:  [65-74] 65  Resp:  [16-18] 18  BP: (111-164)/(59-87) 111/87  SpO2:  [91 %-97 %] 97 %  Body mass index is 73.41 kg/m².     Input and Output Summary (last 24 hours):       Intake/Output Summary (Last 24 hours) at 1/16/2024 1434  Last data filed at 1/15/2024 2110  Gross per 24 hour   Intake 500 ml   Output --   Net 500 ml       Physical Exam:     Physical Exam  Vitals and nursing note reviewed.   Constitutional:       Appearance: She is obese.      Comments: Morbidly obese   HENT:      Head: Normocephalic.   Eyes:      Conjunctiva/sclera: Conjunctivae normal.   Cardiovascular:      Rate and Rhythm: Normal rate.   Pulmonary:      Effort: Pulmonary effort is normal. No respiratory distress.   Abdominal:      General: Bowel sounds are normal. There is no distension.      Palpations: Abdomen is soft.   Musculoskeletal:         General: No swelling.      Right lower leg: No edema.      Left lower leg: No edema.   Skin:     General: Skin is warm and dry.      Comments: Right sided flank wound, drainage and erythema   Neurological:      General: No focal deficit present.      Mental Status: She is alert. Mental status is at baseline.         Additional Data:     Labs:    Results from last 7 days   Lab Units 01/16/24  0549 01/15/24  1514   WBC Thousand/uL 15.03* 12.13*   HEMOGLOBIN g/dL 11.1* 11.9   HEMATOCRIT % 37.2 40.8   PLATELETS Thousands/uL 402* 400*   BANDS PCT % 2  --    NEUTROS PCT %  --  82*   LYMPHS PCT %  --  11*   LYMPHO PCT % 4*  --    MONOS PCT %  --  5   MONO PCT % 2*  --    EOS PCT % 0 2     Results from last 7 days   Lab Units 01/16/24  0549 01/15/24  1514   SODIUM mmol/L 138 138   POTASSIUM mmol/L 4.2 4.1   CHLORIDE mmol/L 103 99   CO2 mmol/L 30 32   BUN mg/dL 37* 40*   CREATININE mg/dL 0.98 1.12   ANION GAP mmol/L 5 7   CALCIUM mg/dL 8.2* 9.2   ALBUMIN g/dL  --  3.1*   TOTAL BILIRUBIN mg/dL  --  0.60   ALK PHOS U/L  --  110*   ALT U/L  --  9   AST U/L  --  15    GLUCOSE RANDOM mg/dL 137 108     Results from last 7 days   Lab Units 01/16/24  0936   INR  1.33*             Results from last 7 days   Lab Units 01/15/24  1514   LACTIC ACID mmol/L 0.8   PROCALCITONIN ng/ml <0.05           * I Have Reviewed All Lab Data Listed Above.  * Additional Pertinent Lab Tests Reviewed: All Labs For Current Hospital Admission Reviewed    Mobility:  Basic Mobility Inpatient Raw Score: 7  [facility] Goal: 2: Bed activities/Dependent transfer  [facility] Achieved: 1: Laying in bed    Lines:     Invasive Devices       Peripheral Intravenous Line  Duration             Peripheral IV 01/15/24 Proximal;Right;Ventral (anterior) Forearm <1 day              Drain  Duration             External Urinary Catheter 125 days                       Imaging:    Imaging Reports Reviewed Today Include:     XR chest 1 view portable    Result Date: 1/16/2024  Impression: No acute cardiopulmonary disease. Resident: JEAN PAUL KRAMER I, the attending radiologist, have reviewed the images and agree with the final report above. Workstation performed: [id number]     CT abdomen pelvis with contrast    Result Date: 1/15/2024  Impression: No acute obstructive or inflammatory bowel abnormality identified. Stable nonobstructing nephrolithiasis. No hydronephrosis or ureterolithiasis. Renal atrophy, left greater than right appearing stable. Marked degenerative changes throughout the spine as well as both hip joints, especially on the right where there is marked remodeling and resorptive changes of the acetabulum and femoral head. Indeterminate 1 cm rounded hypodensity in the caudal tip of the spleen. Workstation performed: [id number]     CT soft tissue neck with contrast    Result Date: 1/15/2024  Impression: No acute neck abnormality given suboptimal evaluation due to patient body habitus and photon starvation artifact. Calcified tonsilloliths in right palatine tonsil, likely sequela of chronic tonsillitis. Additional  chronic/incidental findings as detailed above. Workstation performed: [id number]        Recent Cultures (last 7 days):     Results from last 7 days   Lab Units 01/15/24  1514   BLOOD CULTURE  Received in Microbiology Lab. Culture in Progress.  Received in Microbiology Lab. Culture in Progress.       Last 24 Hours Medication List:   Current Facility-Administered Medications   Medication Dose Route Frequency Provider Last Rate    acetaminophen  650 mg Oral Q6H PRN Anup Harman, PA-C      allopurinol  100 mg Oral Daily Anup Harman, PA-C      cefepime  2,000 mg Intravenous Q12H Anupgaby Hammer, PA-C Stopped (01/16/24 0849)    docusate sodium  100 mg Oral Daily Anup Harman, PA-C      DULoxetine  20 mg Oral Daily Anup Harman, PA-C      furosemide  40 mg Intravenous BID (diuretic) AnupVIRGINIE Ash-C      gabapentin  100 mg Oral TID Anup Harman, PA-C      levothyroxine  125 mcg Oral Early Morning Anupgaby Hammer, PA-C      midodrine  2.5 mg Oral TID AC Anupgaby Hammer, PA-C      nystatin   Topical BID Anup Harman, PA-C      ondansetron  4 mg Intravenous Q6H PRN Anup Harman, PA-C      vancomycin  750 mg Intravenous Q12H Anupgaby Hammer, PA-C Stopped (01/16/24 0951)    warfarin  4 mg Oral Daily (warfarin) Fabio Orr MD          Today, Patient Was Seen By: Fabio Orr MD    ** Please Note: Dictation voice to text software may have been used in the creation of this document. **

## 2024-01-16 NOTE — ED NOTES
Patient placed on bed pan. Patient had bowel movement and repositioned in bed.      Hugh Mattson RN  01/16/24 0891

## 2024-01-16 NOTE — ASSESSMENT & PLAN NOTE
Presented with drainage and swelling of her right abdomen  Had prior admission for same complaint with admission back in September 2023  Continue vancomycin, cefepime with prior wound cultures growing MRSA and Pseudomonas  Wound care consulted  Blood cultures pending, wound culture/stain ordered  Monitor labs, procalcitonin  Suspect white count likely due to steroids given in the ED with reports of swelling and concern for Cipro reaction

## 2024-01-16 NOTE — ASSESSMENT & PLAN NOTE
Patient is rate controlled at this time  We will continue prehospital Coumadin 2 mg p.o. daily except for Tuesday and Thursday which will be 4 mg

## 2024-01-17 ENCOUNTER — HOME HEALTH ADMISSION (OUTPATIENT)
Dept: HOME HEALTH SERVICES | Facility: HOME HEALTHCARE | Age: 74
End: 2024-01-17
Payer: MEDICARE

## 2024-01-17 LAB
ANION GAP SERPL CALCULATED.3IONS-SCNC: 8 MMOL/L
BASOPHILS # BLD AUTO: 0.05 THOUSANDS/ÂΜL (ref 0–0.1)
BASOPHILS NFR BLD AUTO: 1 % (ref 0–1)
BUN SERPL-MCNC: 41 MG/DL (ref 5–25)
CALCIUM SERPL-MCNC: 8.6 MG/DL (ref 8.4–10.2)
CHLORIDE SERPL-SCNC: 100 MMOL/L (ref 96–108)
CO2 SERPL-SCNC: 30 MMOL/L (ref 21–32)
CREAT SERPL-MCNC: 1.21 MG/DL (ref 0.6–1.3)
EOSINOPHIL # BLD AUTO: 0.22 THOUSAND/ÂΜL (ref 0–0.61)
EOSINOPHIL NFR BLD AUTO: 2 % (ref 0–6)
ERYTHROCYTE [DISTWIDTH] IN BLOOD BY AUTOMATED COUNT: 16.9 % (ref 11.6–15.1)
GFR SERPL CREATININE-BSD FRML MDRD: 44 ML/MIN/1.73SQ M
GLUCOSE SERPL-MCNC: 113 MG/DL (ref 65–140)
HCT VFR BLD AUTO: 37.7 % (ref 34.8–46.1)
HGB BLD-MCNC: 11.1 G/DL (ref 11.5–15.4)
IMM GRANULOCYTES # BLD AUTO: 0.05 THOUSAND/UL (ref 0–0.2)
IMM GRANULOCYTES NFR BLD AUTO: 1 % (ref 0–2)
INR PPP: 1.37 (ref 0.84–1.19)
LYMPHOCYTES # BLD AUTO: 1.11 THOUSANDS/ÂΜL (ref 0.6–4.47)
LYMPHOCYTES NFR BLD AUTO: 12 % (ref 14–44)
MAGNESIUM SERPL-MCNC: 2.1 MG/DL (ref 1.9–2.7)
MCH RBC QN AUTO: 24.8 PG (ref 26.8–34.3)
MCHC RBC AUTO-ENTMCNC: 29.4 G/DL (ref 31.4–37.4)
MCV RBC AUTO: 84 FL (ref 82–98)
MONOCYTES # BLD AUTO: 0.7 THOUSAND/ÂΜL (ref 0.17–1.22)
MONOCYTES NFR BLD AUTO: 7 % (ref 4–12)
NEUTROPHILS # BLD AUTO: 7.47 THOUSANDS/ÂΜL (ref 1.85–7.62)
NEUTS SEG NFR BLD AUTO: 77 % (ref 43–75)
NRBC BLD AUTO-RTO: 0 /100 WBCS
PLATELET # BLD AUTO: 389 THOUSANDS/UL (ref 149–390)
PMV BLD AUTO: 8.8 FL (ref 8.9–12.7)
POTASSIUM SERPL-SCNC: 3.8 MMOL/L (ref 3.5–5.3)
PROCALCITONIN SERPL-MCNC: 0.05 NG/ML
PROTHROMBIN TIME: 17.1 SECONDS (ref 11.6–14.5)
RBC # BLD AUTO: 4.47 MILLION/UL (ref 3.81–5.12)
SODIUM SERPL-SCNC: 138 MMOL/L (ref 135–147)
VANCOMYCIN SERPL-MCNC: 17.9 UG/ML (ref 10–20)
WBC # BLD AUTO: 9.6 THOUSAND/UL (ref 4.31–10.16)

## 2024-01-17 PROCEDURE — 84145 PROCALCITONIN (PCT): CPT | Performed by: STUDENT IN AN ORGANIZED HEALTH CARE EDUCATION/TRAINING PROGRAM

## 2024-01-17 PROCEDURE — 85025 COMPLETE CBC W/AUTO DIFF WBC: CPT | Performed by: STUDENT IN AN ORGANIZED HEALTH CARE EDUCATION/TRAINING PROGRAM

## 2024-01-17 PROCEDURE — 80048 BASIC METABOLIC PNL TOTAL CA: CPT | Performed by: STUDENT IN AN ORGANIZED HEALTH CARE EDUCATION/TRAINING PROGRAM

## 2024-01-17 PROCEDURE — 83735 ASSAY OF MAGNESIUM: CPT | Performed by: STUDENT IN AN ORGANIZED HEALTH CARE EDUCATION/TRAINING PROGRAM

## 2024-01-17 PROCEDURE — 80202 ASSAY OF VANCOMYCIN: CPT | Performed by: PHYSICIAN ASSISTANT

## 2024-01-17 PROCEDURE — 85610 PROTHROMBIN TIME: CPT | Performed by: STUDENT IN AN ORGANIZED HEALTH CARE EDUCATION/TRAINING PROGRAM

## 2024-01-17 PROCEDURE — 99233 SBSQ HOSP IP/OBS HIGH 50: CPT

## 2024-01-17 RX ORDER — TRIAMCINOLONE ACETONIDE 1 MG/G
CREAM TOPICAL 2 TIMES DAILY
Status: DISCONTINUED | OUTPATIENT
Start: 2024-01-17 | End: 2024-01-19 | Stop reason: HOSPADM

## 2024-01-17 RX ORDER — MAGNESIUM HYDROXIDE/ALUMINUM HYDROXICE/SIMETHICONE 120; 1200; 1200 MG/30ML; MG/30ML; MG/30ML
30 SUSPENSION ORAL ONCE
Status: COMPLETED | OUTPATIENT
Start: 2024-01-17 | End: 2024-01-17

## 2024-01-17 RX ADMIN — MIDODRINE HYDROCHLORIDE 2.5 MG: 5 TABLET ORAL at 16:27

## 2024-01-17 RX ADMIN — ALUMINUM HYDROXIDE, MAGNESIUM HYDROXIDE, AND DIMETHICONE 30 ML: 200; 20; 200 SUSPENSION ORAL at 22:12

## 2024-01-17 RX ADMIN — CEFEPIME HYDROCHLORIDE 2000 MG: 2 INJECTION, SOLUTION INTRAVENOUS at 20:59

## 2024-01-17 RX ADMIN — ACETAMINOPHEN 650 MG: 325 TABLET ORAL at 10:55

## 2024-01-17 RX ADMIN — GABAPENTIN 100 MG: 100 CAPSULE ORAL at 09:05

## 2024-01-17 RX ADMIN — GABAPENTIN 100 MG: 100 CAPSULE ORAL at 20:59

## 2024-01-17 RX ADMIN — DULOXETINE HYDROCHLORIDE 20 MG: 20 CAPSULE, DELAYED RELEASE ORAL at 09:05

## 2024-01-17 RX ADMIN — FUROSEMIDE 40 MG: 10 INJECTION, SOLUTION INTRAMUSCULAR; INTRAVENOUS at 07:49

## 2024-01-17 RX ADMIN — CEFEPIME HYDROCHLORIDE 2000 MG: 2 INJECTION, SOLUTION INTRAVENOUS at 07:50

## 2024-01-17 RX ADMIN — FUROSEMIDE 40 MG: 10 INJECTION, SOLUTION INTRAMUSCULAR; INTRAVENOUS at 16:27

## 2024-01-17 RX ADMIN — ALLOPURINOL 100 MG: 100 TABLET ORAL at 09:05

## 2024-01-17 RX ADMIN — MIDODRINE HYDROCHLORIDE 2.5 MG: 5 TABLET ORAL at 07:39

## 2024-01-17 RX ADMIN — NYSTATIN: 100000 POWDER TOPICAL at 17:44

## 2024-01-17 RX ADMIN — GABAPENTIN 100 MG: 100 CAPSULE ORAL at 16:27

## 2024-01-17 RX ADMIN — VANCOMYCIN HYDROCHLORIDE 1250 MG: 1 INJECTION, POWDER, LYOPHILIZED, FOR SOLUTION INTRAVENOUS at 16:30

## 2024-01-17 RX ADMIN — LEVOTHYROXINE SODIUM 125 MCG: 25 TABLET ORAL at 05:06

## 2024-01-17 RX ADMIN — NYSTATIN 1 APPLICATION: 100000 POWDER TOPICAL at 09:04

## 2024-01-17 RX ADMIN — TRIAMCINOLONE ACETONIDE: 1 CREAM TOPICAL at 17:44

## 2024-01-17 RX ADMIN — MIDODRINE HYDROCHLORIDE 2.5 MG: 5 TABLET ORAL at 12:25

## 2024-01-17 RX ADMIN — WARFARIN SODIUM 4 MG: 2 TABLET ORAL at 17:44

## 2024-01-17 NOTE — PLAN OF CARE
Problem: Prexisting or High Potential for Compromised Skin Integrity  Goal: Skin integrity is maintained or improved  Description: INTERVENTIONS:  - Identify patients at risk for skin breakdown  - Assess and monitor skin integrity  - Assess and monitor nutrition and hydration status  - Monitor labs   - Assess for incontinence   - Turn and reposition patient  - Assist with mobility/ambulation  - Relieve pressure over bony prominences  - Avoid friction and shearing  - Provide appropriate hygiene as needed including keeping skin clean and dry  - Evaluate need for skin moisturizer/barrier cream  - Collaborate with interdisciplinary team   - Patient/family teaching  - Consider wound care consult   Outcome: Progressing     Problem: Potential for Falls  Goal: Patient will remain free of falls  Description: INTERVENTIONS:  - Educate patient/family on patient safety including physical limitations  - Instruct patient to call for assistance with activity   - Consult OT/PT to assist with strengthening/mobility   - Keep Call bell within reach  - Keep bed low and locked with side rails adjusted as appropriate  - Keep care items and personal belongings within reach  - Initiate and maintain comfort rounds  - Make Fall Risk Sign visible to staff  - Offer Toileting every 2 Hours, in advance of need  - Initiate/Maintain bed alarm  - Apply yellow socks and bracelet for high fall risk patients  - Consider moving patient to room near nurses station  Outcome: Progressing     Problem: PAIN - ADULT  Goal: Verbalizes/displays adequate comfort level or baseline comfort level  Description: Interventions:  - Encourage patient to monitor pain and request assistance  - Assess pain using appropriate pain scale  - Administer analgesics based on type and severity of pain and evaluate response  - Implement non-pharmacological measures as appropriate and evaluate response  - Consider cultural and social influences on pain and pain management  -  Notify physician/advanced practitioner if interventions unsuccessful or patient reports new pain  Outcome: Progressing     Problem: INFECTION - ADULT  Goal: Absence or prevention of progression during hospitalization  Description: INTERVENTIONS:  - Assess and monitor for signs and symptoms of infection  - Monitor lab/diagnostic results  - Monitor all insertion sites, i.e. indwelling lines, tubes, and drains  - Monitor endotracheal if appropriate and nasal secretions for changes in amount and color  - [place] appropriate cooling/warming therapies per order  - Administer medications as ordered  - Instruct and encourage patient and family to use good hand hygiene technique  - Identify and instruct in appropriate isolation precautions for identified infection/condition  Outcome: Progressing  Goal: Absence of fever/infection during neutropenic period  Description: INTERVENTIONS:  - Monitor WBC    Outcome: Progressing     Problem: SAFETY ADULT  Goal: Patient will remain free of falls  Description: INTERVENTIONS:  - Educate patient/family on patient safety including physical limitations  - Instruct patient to call for assistance with activity   - Consult OT/PT to assist with strengthening/mobility   - Keep Call bell within reach  - Keep bed low and locked with side rails adjusted as appropriate  - Keep care items and personal belongings within reach  - Initiate and maintain comfort rounds  - Make Fall Risk Sign visible to staff  - Offer Toileting every 2 Hours, in advance of need  - Initiate/Maintain  bed alarm  - Apply yellow socks and bracelet for high fall risk patients  - Consider moving patient to room near nurses station  Outcome: Progressing  Goal: Maintain or return to baseline ADL function  Description: INTERVENTIONS:  -  Assess patient's ability to carry out ADLs; assess patient's baseline for ADL function and identify physical deficits which impact ability to perform ADLs (bathing, care of mouth/teeth,  toileting, grooming, dressing, etc.)  - Assess/evaluate cause of self-care deficits   - Assess range of motion  - Assess patient's mobility; develop plan if impaired  - Assess patient's need for assistive devices and provide as appropriate  - Encourage maximum independence but intervene and supervise when necessary  - Involve family in performance of ADLs  - Assess for home care needs following discharge   - Consider OT consult to assist with ADL evaluation and planning for discharge  - Provide patient education as appropriate  Outcome: Progressing  Goal: Maintains/Returns to pre admission functional level  Description: INTERVENTIONS:  - Perform AM-PAC 6 Click Basic Mobility/ Daily Activity assessment daily.  - Set and communicate daily mobility goal to care team and patient/family/caregiver.   - Collaborate with rehabilitation services on mobility goals if consulted  - Perform Range of Motion 3 times a day.  - Reposition patient every 2 hours.  - Dangle patient 3 times a day  - Stand patient 3 times a day  - Ambulate patient 3 times a day  - Out of bed to chair 3 times a day   - Out of bed for meals 3 times a day  - Out of bed for toileting  - Record patient progress and toleration of activity level   Outcome: Progressing     Problem: DISCHARGE PLANNING  Goal: Discharge to home or other facility with appropriate resources  Description: INTERVENTIONS:  - Identify barriers to discharge w/patient and caregiver  - Arrange for needed discharge resources and transportation as appropriate  - Identify discharge learning needs (meds, wound care, etc.)  - Arrange for interpretive services to assist at discharge as needed  - Refer to Case Management Department for coordinating discharge planning if the patient needs post-hospital services based on physician/advanced practitioner order or complex needs related to functional status, cognitive ability, or social support system  Outcome: Progressing     Problem: Knowledge  Deficit  Goal: Patient/family/caregiver demonstrates understanding of disease process, treatment plan, medications, and discharge instructions  Description: Complete learning assessment and assess knowledge base.  Interventions:  - Provide teaching at level of understanding  - Provide teaching via preferred learning methods  Outcome: Progressing

## 2024-01-17 NOTE — CASE MANAGEMENT
Case Management Discharge Planning Note    Patient name Zee Kirk  Location /-01 MRN [id number]  : 1950 Date 2024       Current Admission Date: 1/15/2024  Current Admission Diagnosis:Panniculitis   Patient Active Problem List    Diagnosis Date Noted    Left leg pain 2023    Shingles 2023    Cellulitis- Ruled Out 2023    Chronic atrial fibrillation (HCC) 2022    History of Clostridioides difficile infection 2022    Lymphedema of extremity 2021    Other specified hypothyroidism 10/03/2020    Mild episode of recurrent major depressive disorder (HCC) 10/03/2020    Idiopathic chronic 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 10/03/2020    Primary osteoarthritis involving multiple joints 10/03/2020    Class 3 severe obesity due to excess calories with serious comorbidity and body mass index (BMI) greater than or equal to 70 in adult (HCC) 10/03/2020    Chronic diastolic congestive heart failure (HCC) 10/03/2020    Panniculitis 10/03/2020    Gastroesophageal reflux disease without esophagitis 10/03/2020    Hx MRSA infection 2020    Impaired mobility 2019    Osteoarthritis of glenohumeral joint 2019    Left ovarian cyst 2017    Depression with anxiety 07/15/2017    Anemia 2016    Ambulatory dysfunction 2016    Adjustment disorder 2013    Irritable bowel syndrome 2012    Left atrial enlargement 2012    Left ventricular hypertrophy 2012    Carpal tunnel syndrome 2012    Gout 2012    Hyperlipidemia 2012    Symptomatic menopausal or female climacteric states 2012      LOS (days): 2  Geometric Mean LOS (GMLOS) (days): 3  Days to GMLOS:1.2     OBJECTIVE:  Risk of Unplanned Readmission Score: 25.68         Current admission status: Inpatient   Preferred Pharmacy:   [facility] [place] - [address]  [place] [address]  Phone:  [phone] Fax: [phone]    [facility] - [place] - [facility] [address]  [address] [facility] [address]  Phone: [phone] Fax: [phone]    Primary Care Provider: Franklyn Caruso MD    Primary Insurance: MEDICARE  Secondary Insurance: AARP    DISCHARGE DETAILS:    Discharge planning discussed with:: patient *& Byron at 9:12 am & 15:27pm & Robbie at 16:10pm   Freedom of Choice: Yes  Comments - Freedom of Choice: cm gave irene list of accepting hhc agencies- pt's choice is [facility]  CM contacted family/caregiver?: Yes             Contacts  Patient Contacts: Robbie Richmond  Relationship to Patient:: Family (nephew)  Contact Method: Phone  Phone Number: [phone]  LM for him to call to discuss d/c plan  Reason/Outcome: Discharge Planning    Requested Home Health Care         Is the patient interested in HHC at discharge?: Yes    DME Referral Provided  Referral made for DME?: No    Other Referral/Resources/Interventions Provided:  Interventions: HHA, Other (Specify)  Referral Comments: [facility] accepted [facility] - [facility]'s choice- cm spoke to Dustin at [facility] at 9:12 am & 15:27pm - he stated they needs 24hrs of d/c - cm made him aware at 15:27pm today  pt to be d/c on Friday 1/19/2024 at 10:00am - he will call me back to confirm the caregiver will be there- [facility] was called 10 am [facility] transport has been set up    Would you like to participate in our [facility] service program?  : No - Declined    Treatment Team Recommendation: Home with Home Health Care (home with caregiver & slvna & ffollow up care- 10 am [facility] [place] on 1/19/2024)                                   IMM Given (Date):: 01/17/24  IMM Given to:: Patient

## 2024-01-17 NOTE — WOUND OSTOMY CARE
Consult Note - Wound   Zee Kirk 73 y.o. female MRN: [id number]  Unit/Bed#: -01 Encounter: [id number]      History and Present Illness:  Patient is 73 year old female admitted to [facility] [place] with panniculitis. Patient history significant for morbid obesity with a BMI of 70.08, chronic lymphedema. recurrent cellulitis, C-diff colitis, AFIB, CHF and hypothyroidism, she is bed bound. Wound care consulted for wounds to the folds.  Patient seen on prior admissions by wound care team for recurrent wounds to the body folds. She refuses Maxorb/ Ag, Maxorb rope, Interdry. Patient has her preference for skin fold breakdown. She has Nystatin powder ordered and has Kenalog cream ordered for the wounds to the abdominal fold. She will allow ABD pads to be placed in the folds.    Assessment Findings:   Patient in bed for assessment, she is awake, alert and oriented. She states she is comfortable in the bed at this time and does not prefer to have a bariatric bed. She is an assist to turn onto her side for assessment. She has a purewick in place for urinary management, and is not incontinent of stool. She is pleasant and cooperative. She is a moderate assist with hygiene care.     1- Bilateral heels intact  2- POA- Skin folds to the bilateral knees, right and left abdominal fold, bilateral breast folds and right and left lateral thigh-hip areas of lymphedema. Erythema to the folds, scattered areas of partial thickness erosion, right lateral hip-thigh area of lymphedema draining small amount of serous drainage.   3- Buttocks and sacrum intact     Skin and Wound Care Plan:   1. Cleanse folds to the abdomen, breast, upper thigh and knee folds with Remedy foaming cleanser, pat dry. Dust Nystatin powder in b/l breast, b/l knee and left abdominal fold fold, place ABD in fold to control moisture, change daily and PRN. Mid abdominal fold and medial right abdominal fold wounds apply Kenalog-Triamcinolone cream and ABD in fold to  control moisture, change daily and PRN  2. Apply skin nourishing cream to the skin daily  3. Hydraguard to bilateral sacrum, buttock and heels BID and PRN  4- Apply hydraguard to b/l hip lymphedema BID and PRN and place incontinence pads beneath each side to absorb drainage.    Wound:   Wound 03/22/23 Thigh Left;Lateral (Active)   Wound Description Fragile;Pink 01/17/24 1547   Drainage Amount Small 01/17/24 1413   Drainage Description Serous 01/17/24 1413   Treatments Cleansed 01/17/24 1547   Dressing ABD 01/17/24 1547   Dressing Changed Changed 01/17/24 1413   Patient Tolerance Tolerated well 01/17/24 1547   Dressing Status Clean;Dry;Intact 01/17/24 1413       Wound 03/22/23 Abdomen Right;Lateral (Active)   Wound Description Beefy red;Fragile 01/17/24 1547   Drainage Amount Scant 01/17/24 1547   Drainage Description Serosanguineous 01/17/24 1547   Treatments Cleansed 01/17/24 1547   Dressing ABD 01/17/24 1547   Dressing Changed Changed 01/17/24 1547   Patient Tolerance Tolerated well 01/17/24 1357   Dressing Status Clean;Dry;Intact 01/17/24 1357       Wound 08/05/23 MASD Hip Right;Posterior (Active)   Wound Description Beefy red 01/17/24 1550   Drainage Amount Scant 01/17/24 1550   Drainage Description Serous 01/17/24 1550   Treatments Cleansed 01/17/24 1550   Dressing ABD 01/17/24 1550   Dressing Changed Changed 01/17/24 1416   Patient Tolerance Tolerated well 01/17/24 1550   Dressing Status Clean;Dry;Intact 01/17/24 1416       Wound 08/05/23 MASD Pelvis Anterior;Left (Active)   Wound Description Beefy red;Fragile 01/17/24 1547   Drainage Amount None 01/17/24 1401   Treatments Cleansed 01/17/24 1547   Dressing ABD;Other (Comment) 01/17/24 1547   Patient Tolerance Tolerated well 01/17/24 1547       Wound 08/05/23 MASD Abdomen Medial;Lower (Active)   Wound Image   01/17/24 1547   Wound Description Beefy red;Fragile 01/17/24 1547   Alison-wound Assessment Erythema;Fragile 01/17/24 1547   Wound Length (cm) 1.5 cm  01/17/24 1547   Wound Width (cm) 7 cm 01/17/24 1547   Wound Depth (cm) 0.1 cm 01/17/24 1547   Wound Surface Area (cm^2) 10.5 cm^2 01/17/24 1547   Wound Volume (cm^3) 1.05 cm^3 01/17/24 1547   Calculated Wound Volume (cm^3) 1.05 cm^3 01/17/24 1547   Change in Wound Size % 0 01/17/24 1547   Drainage Amount Scant 01/17/24 1547   Drainage Description Serosanguineous 01/17/24 1547   Non-staged Wound Description Partial thickness 01/17/24 1547   Treatments Cleansed 01/17/24 1547   Dressing ABD;Other (Comment) 01/17/24 1547   Dressing Changed Changed 01/17/24 1547   Patient Tolerance Tolerated well 01/17/24 1547   Dressing Status Clean;Dry;Intact 01/17/24 1400       Wound 08/05/23 MASD Lymphedema Hip Right (Active)   Wound Description Drainage 01/17/24 1547   Drainage Amount Scant 01/17/24 1547   Drainage Description Serous 01/17/24 1547   Treatments Cleansed 01/17/24 1547   Dressing ABD 01/17/24 1547   Patient Tolerance Tolerated well 01/17/24 1547       Wound 08/21/23 MASD Abdomen Medial;Right (Active)   Wound Image   01/17/24 1550   Wound Description Beefy red;Fragile 01/17/24 1550   Wound Length (cm) 1 cm 01/17/24 1550   Wound Width (cm) 1.8 cm 01/17/24 1550   Wound Depth (cm) 0.1 cm 01/17/24 1550   Wound Surface Area (cm^2) 1.8 cm^2 01/17/24 1550   Wound Volume (cm^3) 0.18 cm^3 01/17/24 1550   Calculated Wound Volume (cm^3) 0.18 cm^3 01/17/24 1550   Change in Wound Size % 93.33 01/17/24 1550   Drainage Amount Scant 01/17/24 1550   Drainage Description Serosanguineous 01/17/24 1550   Non-staged Wound Description Partial thickness 01/17/24 1550   Treatments Cleansed 01/17/24 1550   Dressing ABD;Other (Comment) 01/17/24 1550   Dressing Changed Changed 01/17/24 1550   Patient Tolerance Tolerated well 01/17/24 1550   Dressing Status Clean;Dry;Intact 01/17/24 1359       Wound 01/17/24 MASD Breast Lateral;Left;Lower (Active)   Wound Image   01/17/24 1546   Wound Description Beefy red 01/17/24 1544   Alison-wound Assessment  Erythema;Fragile 01/17/24 1544   Wound Length (cm) 0.2 cm 01/17/24 1544   Wound Width (cm) 1.8 cm 01/17/24 1544   Wound Depth (cm) 0.1 cm 01/17/24 1544   Wound Surface Area (cm^2) 0.36 cm^2 01/17/24 1544   Wound Volume (cm^3) 0.036 cm^3 01/17/24 1544   Calculated Wound Volume (cm^3) 0.04 cm^3 01/17/24 1544   Drainage Amount Scant 01/17/24 1544   Drainage Description Serosanguineous 01/17/24 1544   Non-staged Wound Description Partial thickness 01/17/24 1544   Treatments Cleansed 01/17/24 1544   Dressing ABD;Other (Comment) 01/17/24 1544   Patient Tolerance Tolerated well 01/17/24 1544       Wound 01/17/24 MASD Breast Right;Lower (Active)   Wound Image   01/17/24 1554   Wound Description Beefy red 01/17/24 1554   Alison-wound Assessment Rash 01/17/24 1554   Drainage Amount None 01/17/24 1554   Treatments Cleansed 01/17/24 1554   Dressing ABD;Other (Comment) 01/17/24 1554   Dressing Changed New 01/17/24 1554   Patient Tolerance Tolerated well 01/17/24 1554     Reviewed plan of care with primary RN Khang  Recommendations written as orders  Wound care team to follow weekly while admitted  Questions or concerns [facility] Wound Care Nurse    Liv Fam BSN, RN, CWON

## 2024-01-17 NOTE — PROGRESS NOTES
Zee Kirk is a 73 y.o. female who is currently ordered Vancomycin IV with management by the Pharmacy Consult service.  Relevant clinical data and objective / subjective history reviewed.  Vancomycin Assessment:  Indication and Goal AUC/Trough: Soft tissue (goal -600, trough >10), -600, trough >10  Clinical Status: stable  Micro:     Renal Function:  SCr: 1.21 mg/dL  CrCl: 70.4 mL/min  Renal replacement: Not on dialysis  Days of Therapy: 3  Current Dose: 750 mg iv q 12 hrs  Vancomycin Plan:  New Dosin mg iv q 24 hrs  Estimated AUC: 448 mcg*hr/mL  Estimated Trough: 11.3 mcg/mL  Next Level: 24 @ 0600  Renal Function Monitoring: Daily BMP and UOP  Pharmacy will continue to follow closely for s/sx of nephrotoxicity, infusion reactions and appropriateness of therapy.  BMP and CBC will be ordered per protocol. We will continue to follow the patient’s culture results and clinical progress daily.    Cata Kumar, Pharmacist

## 2024-01-17 NOTE — ASSESSMENT & PLAN NOTE
Wt Readings from Last 3 Encounters:   01/17/24 (!) 185 kg (408 lb 4.7 oz)   09/18/23 (!) 194 kg (426 lb 13 oz)   08/24/23 (!) 200 kg (439 lb 15.9 oz)     Not in exacerbation at this time  Difficult to discern volume status due to body habitus  On 2 L nasal cannula with nonlabored respirations  Continue Lasix 40 mg IV twice daily today  Home regimen is 60 mg Lasix daily  Monitor fluid status closely  Daily weight  Intake and output monitoring

## 2024-01-17 NOTE — PROGRESS NOTES
[facility]  Progress Note  Name: Zee Kirk I  MRN: [id number]  Unit/Bed#: -01 I Date of Admission: 1/15/2024   Date of Service: 1/17/2024 I Hospital Day: 2    Assessment/Plan   * Panniculitis  Assessment & Plan  Presented with drainage and swelling of her right abdomen  Had prior admission for same complaint with admission back in September 2023  Continue vancomycin, cefepime with prior wound cultures growing MRSA and Pseudomonas  Wound cultures pending, plan de-escalation of antibiotics pending culture results  Wound care consulted  Blood cultures no growth after 24 hours  Procalcitonin negative x 2   Leukocytosis resolved today  Remains afebrile  CBC a.m.    Chronic diastolic congestive heart failure (HCC)  Assessment & Plan  Wt Readings from Last 3 Encounters:   01/17/24 (!) 185 kg (408 lb 4.7 oz)   09/18/23 (!) 194 kg (426 lb 13 oz)   08/24/23 (!) 200 kg (439 lb 15.9 oz)     Not in exacerbation at this time  Difficult to discern volume status due to body habitus  On 2 L nasal cannula with nonlabored respirations  Continue Lasix 40 mg IV twice daily today  Home regimen is 60 mg Lasix daily  Monitor fluid status closely  Daily weight  Intake and output monitoring    Chronic atrial fibrillation (HCC)  Assessment & Plan  Patient is rate controlled at this time  Continue Coumadin  Check INR daily    Adjustment disorder  Assessment & Plan  Continue Cymbalta 20 mg p.o. daily    Other specified hypothyroidism  Assessment & Plan  Continue levothyroxine               VTE Pharmacologic Prophylaxis: VTE Score: 9 High Risk (Score >/= 5) - Pharmacological DVT Prophylaxis Ordered: warfarin (Coumadin). Sequential Compression Devices Ordered.    Mobility:   Basic Mobility Inpatient Raw Score: 8  -HLM Goal: 3: Sit at edge of bed  JH-HLM Achieved: 2: Bed activities/Dependent transfer  HLM Goal NOT achieved. Continue with multidisciplinary rounding and encourage appropriate mobility to  improve upon [facility] goals.    Patient Centered Rounds: I performed bedside rounds with nursing staff today.   Discussions with Specialists or Other Care Team Provider: Nursing, case management    Education and Discussions with Family / Patient: Patient declined call to .     Total Time Spent on Date of Encounter in care of patient: 40 mins. This time was spent on one or more of the following: performing physical exam; counseling and coordination of care; obtaining or reviewing history; documenting in the medical record; reviewing/ordering tests, medications or procedures; communicating with other healthcare professionals and discussing with patient's family/caregivers.    Current Length of Stay: 2 day(s)  Current Patient Status: Inpatient   Certification Statement: The patient will continue to require additional inpatient hospital stay due to continues on IV antibiotics for panniculitis, wound culture pending  Discharge Plan: Anticipate discharge in 48 hrs to home with home services.    Code Status: Level 1 - Full Code    Subjective:   Patient    Objective:     Vitals:   Temp (24hrs), Av °F (36.7 °C), Min:97.5 °F (36.4 °C), Max:98.3 °F (36.8 °C)    Temp:  [97.5 °F (36.4 °C)-98.3 °F (36.8 °C)] 97.5 °F (36.4 °C)  HR:  [71-74] 74  Resp:  [18-19] 18  BP: ()/(51-83) 117/58  SpO2:  [84 %-97 %] 95 %  Body mass index is 70.08 kg/m².     Input and Output Summary (last 24 hours):     Intake/Output Summary (Last 24 hours) at 2024 1357  Last data filed at 2024 1151  Gross per 24 hour   Intake 120 ml   Output 2625 ml   Net -2505 ml       Physical Exam:   Physical Exam  Vitals and nursing note reviewed.   Constitutional:       General: She is not in acute distress.     Appearance: She is well-developed. She is obese.   HENT:      Head: Normocephalic and atraumatic.      Mouth/Throat:      Mouth: Mucous membranes are moist.   Cardiovascular:      Rate and Rhythm: Normal rate and regular rhythm.       Pulses: Normal pulses.      Heart sounds: No murmur heard.  Pulmonary:      Effort: Pulmonary effort is normal. No respiratory distress.      Breath sounds: Normal breath sounds. No wheezing or rales.   Abdominal:      General: Bowel sounds are normal. There is no distension.      Palpations: Abdomen is soft.      Tenderness: There is no abdominal tenderness. There is no guarding.   Musculoskeletal:         General: No swelling. Normal range of motion.   Skin:     General: Skin is warm and dry.      Capillary Refill: Capillary refill takes less than 2 seconds.   Neurological:      General: No focal deficit present.      Mental Status: She is alert and oriented to person, place, and time. Mental status is at baseline.   Psychiatric:         Mood and Affect: Mood normal.         Behavior: Behavior normal.         Thought Content: Thought content normal.         Judgment: Judgment normal.          Additional Data:     Labs:  Results from last 7 days   Lab Units 01/17/24  0500 01/16/24  0549   WBC Thousand/uL 9.60 15.03*   HEMOGLOBIN g/dL 11.1* 11.1*   HEMATOCRIT % 37.7 37.2   PLATELETS Thousands/uL 389 402*   BANDS PCT %  --  2   NEUTROS PCT % 77*  --    LYMPHS PCT % 12*  --    LYMPHO PCT %  --  4*   MONOS PCT % 7  --    MONO PCT %  --  2*   EOS PCT % 2 0     Results from last 7 days   Lab Units 01/17/24  0500 01/16/24  0549 01/15/24  1514   SODIUM mmol/L 138   < > 138   POTASSIUM mmol/L 3.8   < > 4.1   CHLORIDE mmol/L 100   < > 99   CO2 mmol/L 30   < > 32   BUN mg/dL 41*   < > 40*   CREATININE mg/dL 1.21   < > 1.12   ANION GAP mmol/L 8   < > 7   CALCIUM mg/dL 8.6   < > 9.2   ALBUMIN g/dL  --   --  3.1*   TOTAL BILIRUBIN mg/dL  --   --  0.60   ALK PHOS U/L  --   --  110*   ALT U/L  --   --  9   AST U/L  --   --  15   GLUCOSE RANDOM mg/dL 113   < > 108    < > = values in this interval not displayed.     Results from last 7 days   Lab Units 01/17/24  0500   INR  1.37*             Results from last 7 days   Lab Units  01/17/24  0500 01/15/24  1514   LACTIC ACID mmol/L  --  0.8   PROCALCITONIN ng/ml 0.05 <0.05       Lines/Drains:  Invasive Devices       Peripheral Intravenous Line  Duration             Peripheral IV 01/15/24 Proximal;Right;Ventral (anterior) Forearm 1 day              Drain  Duration             External Urinary Catheter <1 day                          Imaging: Reviewed radiology reports from this admission including: chest xray and abdominal/pelvic CT    Recent Cultures (last 7 days):   Results from last 7 days   Lab Units 01/16/24  1525 01/15/24  1514   BLOOD CULTURE   --  No Growth at 24 hrs.  No Growth at 24 hrs.   GRAM STAIN RESULT  No polys seen*  3+ Gram positive cocci in pairs*  3+ Gram negative rods*  2+ Gram positive rods*  --    WOUND CULTURE  Culture too young- will reincubate  --        Last 24 Hours Medication List:   Current Facility-Administered Medications   Medication Dose Route Frequency Provider Last Rate    acetaminophen  650 mg Oral Q6H PRN Anup Hammer PA-C      allopurinol  100 mg Oral Daily Anup Hammer PA-C      cefepime  2,000 mg Intravenous Q12H VIRGINIE Strange-C 2,000 mg (01/17/24 0750)    docusate sodium  100 mg Oral Daily Anup Hammer PA-C      DULoxetine  20 mg Oral Daily Anup Hammer PA-C      furosemide  40 mg Intravenous BID (diuretic) Anup Hammer PA-C      gabapentin  100 mg Oral TID Anup Hammer PA-C      levothyroxine  125 mcg Oral Early Morning Anup Hammer PA-C      midodrine  2.5 mg Oral TID AC Anup Hammer PA-C      nystatin   Topical BID Anupgaby Hammer PA-C      ondansetron  4 mg Intravenous Q6H PRN Anupgaby Hammer PA-C      vancomycin  1,250 mg Intravenous Q24H Anupgaby Hammer PA-C      warfarin  4 mg Oral Daily (warfarin) Fabio Orr MD          Today, Patient Was Seen By: ORACIO Last    **Please Note: This note may have been constructed using a voice recognition system.**

## 2024-01-17 NOTE — CASE MANAGEMENT
Case Management Assessment & Discharge Planning Note    Patient name Zee Kirk  Location /-01 MRN [id number]  : 1950 Date 2024       Current Admission Date: 1/15/2024  Current Admission Diagnosis:Panniculitis   Patient Active Problem List    Diagnosis Date Noted    Left leg pain 2023    Shingles 2023    Cellulitis- Ruled Out 2023    Chronic atrial fibrillation (HCC) 2022    History of Clostridioides difficile infection 2022    Lymphedema of extremity 2021    Other specified hypothyroidism 10/03/2020    Mild episode of recurrent major depressive disorder (HCC) 10/03/2020    Idiopathic chronic 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 10/03/2020    Primary osteoarthritis involving multiple joints 10/03/2020    Class 3 severe obesity due to excess calories with serious comorbidity and body mass index (BMI) greater than or equal to 70 in adult (HCC) 10/03/2020    Chronic diastolic congestive heart failure (HCC) 10/03/2020    Panniculitis 10/03/2020    Gastroesophageal reflux disease without esophagitis 10/03/2020    Hx MRSA infection 2020    Impaired mobility 2019    Osteoarthritis of glenohumeral joint 2019    Left ovarian cyst 2017    Depression with anxiety 07/15/2017    Anemia 2016    Ambulatory dysfunction 2016    Adjustment disorder 2013    Irritable bowel syndrome 2012    Left atrial enlargement 2012    Left ventricular hypertrophy 2012    Carpal tunnel syndrome 2012    Gout 2012    Hyperlipidemia 2012    Symptomatic menopausal or female climacteric states 2012      LOS (days): 1  Geometric Mean LOS (GMLOS) (days): 3  Days to GMLOS:2     OBJECTIVE:    Risk of Unplanned Readmission Score: 29.4         Current admission status: Inpatient  Referral Reason: Other (d/c planning)    Preferred Pharmacy:   [facility] [place] [address]  [address]  [place] [address]  Phone: [phone] Fax: [phone]    [facility] - [place], [place] - [address]  [address] [facility] [address]  Phone: [phone] Fax: [phone]    Primary Care Provider: Franklyn Caruso MD    Primary Insurance: MEDICARE  Secondary Insurance: AARP    ASSESSMENT:  Active Health Care Proxies       Robbie Richmond  Alternate Health Care Representative - Nephew   Primary Phone: [phone] (Mobile)                 Advance Directives  Does patient have a Health Care POA?: Yes (family needs to bring in paperwork)  Does patient have Advance Directives?: Yes (family needs to bring in paperwork)         Readmission Root Cause  30 Day Readmission: No    Patient Information  Admitted from:: Home  Mental Status: Alert  During Assessment patient was accompanied by: Not accompanied during assessment  Assessment information provided by:: Patient  Primary Caregiver: Private caregiver  Caregiver's Name:: Byron  Caregiver's Relationship to Patient:: Other (Specify)  Caregiver's Telephone Number:: [phone]  Support Systems: Family members, Private Caregivers (caregiver, niece & nephew)  County of Residence: [place]  What city do you live in?: [place]  Home entry access options. Select all that apply.: Ramp  Type of Current Residence: [address] home  Upon entering residence, is there a bedroom on the main floor (no further steps)?: Yes (1/2 bath- pt uses the stair glide ot get to walk in shower on the 2nd floor)  Upon entering residence, is there a bathroom on the main floor (no further steps)?: Yes (1/2 bath on 1st floor- stair glide to the 2nd floor- walk in-shower on 2nd floor)  Living Arrangements: Lives Alone (caregiver 12hrs /day for 7 days per week)  Is patient a ?: No    Activities of Daily Living Prior to Admission  Functional Status: Total dependent  Completes ADLs independently?: No  Level of ADL dependence: Total  Dependent  Ambulates independently?: No  Level of ambulatory dependence: Total Dependent  Does patient use assisted devices?: Yes  Assisted Devices (DME) used: Wheelchair, Hospital Bed, Markus lift, Stair Chair/[place]  Does patient currently own DME?: Yes  What DME does the patient currently own?: Bedside Commode, Shower Chair, Stair Chair/Glide, Walker, Wheelchair, Markus lift, Other (Comment) (bed pan, bp cuff, pulse ox,walk in shower)  Does patient have a history of Outpatient Therapy (PT/OT)?: No  Does the patient have a history of Short-Term Rehab?: Yes ([facility] , [place], [place])  Does patient have a history of HHC?: Yes ([facility])  Does patient currently have HHC?: No         Patient Information Continued  Income Source: Pension/[employer]  Does patient have prescription coverage?: Yes (mail order- [facility])  Does patient receive dialysis treatments?: No  Does patient have a history of substance abuse?: No  Does patient have a history of Mental Health Diagnosis?: No         Means of Transportation  Means of Transport to Appts:: Other (Comment) (medical transport)      Housing Stability: Low Risk  (1/16/2024)    Housing Stability Vital Sign     Unable to Pay for Housing in the Last Year: No     Number of Places Lived in the Last Year: 1     Unstable Housing in the Last Year: No   Food Insecurity: No Food Insecurity (1/16/2024)    Hunger Vital Sign     Worried About Running Out of Food in the Last Year: Never true     Ran Out of Food in the Last Year: Never true   Transportation Needs: No Transportation Needs (1/16/2024)    PRAPARE - Transportation     Lack of Transportation (Medical): No     Lack of Transportation (Non-Medical): No   Utilities: Not At Risk (1/16/2024)    C Utilities     Threatened with loss of utilities: No       DISCHARGE DETAILS:    Discharge planning discussed with:: patient  Freedom of Choice: Yes  Comments - Freedom of Choice: pt gave permission to place a blanket  referral for hhc-                     Requested Home Health Care         Is the patient interested in HHC at discharge?: Yes  Home Health Discipline requested:: Nursing  Home Health Agency Name:: Other  Home Health Follow-Up Provider:: TARIK  Home Health Services Needed:: Wound/Ostomy Care, Heart Failure Management  Homebound Criteria Met:: Requires the Assistance of Another Person for Safe Ambulation or to Leave the Home, Uses an Assist Device (i.e. cane, walker, etc), Requires Medical Transportation  Supporting Clincal Findings:: Bed Bound or Wheelchair Bound    DME Referral Provided  Referral made for DME?: No    Other Referral/Resources/Interventions Provided:  Interventions: HHC  Referral Comments: referrals for hhc sent via petty    Would you like to participate in our [facility] service program?  : No - Declined    Treatment Team Recommendation: Home (home with caregiver & hhc &outpt follow up- BLS)

## 2024-01-17 NOTE — PLAN OF CARE
Problem: Prexisting or High Potential for Compromised Skin Integrity  Goal: Skin integrity is maintained or improved  Description: INTERVENTIONS:  - Identify patients at risk for skin breakdown  - Assess and monitor skin integrity  - Assess and monitor nutrition and hydration status  - Monitor labs   - Assess for incontinence   - Turn and reposition patient  - Assist with mobility/ambulation  - Relieve pressure over bony prominences  - Avoid friction and shearing  - Provide appropriate hygiene as needed including keeping skin clean and dry  - Evaluate need for skin moisturizer/barrier cream  - Collaborate with interdisciplinary team   - Patient/family teaching  - Consider wound care consult   Outcome: Progressing     Problem: Potential for Falls  Goal: Patient will remain free of falls  Description: INTERVENTIONS:  - Educate patient/family on patient safety including physical limitations  - Instruct patient to call for assistance with activity   - Consult OT/PT to assist with strengthening/mobility   - Keep Call bell within reach  - Keep bed low and locked with side rails adjusted as appropriate  - Keep care items and personal belongings within reach  - Initiate and maintain comfort rounds  - Make Fall Risk Sign visible to staff  - Offer Toileting every 2 Hours, in advance of need  - Initiate/Maintain bed alarm  - Obtain necessary fall risk management equipment: nonslip socks  - Apply yellow socks and bracelet for high fall risk patients  - Consider moving patient to room near nurses station  Outcome: Progressing     Problem: PAIN - ADULT  Goal: Verbalizes/displays adequate comfort level or baseline comfort level  Description: Interventions:  - Encourage patient to monitor pain and request assistance  - Assess pain using appropriate pain scale  - Administer analgesics based on type and severity of pain and evaluate response  - Implement non-pharmacological measures as appropriate and evaluate response  - Consider  cultural and social influences on pain and pain management  - Notify physician/advanced practitioner if interventions unsuccessful or patient reports new pain  Outcome: Progressing     Problem: INFECTION - ADULT  Goal: Absence or prevention of progression during hospitalization  Description: INTERVENTIONS:  - Assess and monitor for signs and symptoms of infection  - Monitor lab/diagnostic results  - Monitor all insertion sites, i.e. indwelling lines, tubes, and drains  - Monitor endotracheal if appropriate and nasal secretions for changes in amount and color  - Williamson appropriate cooling/warming therapies per order  - Administer medications as ordered  - Instruct and encourage patient and family to use good hand hygiene technique  - Identify and instruct in appropriate isolation precautions for identified infection/condition  Outcome: Progressing  Goal: Absence of fever/infection during neutropenic period  Description: INTERVENTIONS:  - Monitor WBC    Outcome: Progressing     Problem: SAFETY ADULT  Goal: Patient will remain free of falls  Description: INTERVENTIONS:  - Educate patient/family on patient safety including physical limitations  - Instruct patient to call for assistance with activity   - Consult OT/PT to assist with strengthening/mobility   - Keep Call bell within reach  - Keep bed low and locked with side rails adjusted as appropriate  - Keep care items and personal belongings within reach  - Initiate and maintain comfort rounds  - Make Fall Risk Sign visible to staff  - Offer Toileting every 2 Hours, in advance of need  - Initiate/Maintain bed alarm  - Obtain necessary fall risk management equipment: nonslip socks  - Apply yellow socks and bracelet for high fall risk patients  - Consider moving patient to room near nurses station  Outcome: Progressing  Goal: Maintain or return to baseline ADL function  Description: INTERVENTIONS:  -  Assess patient's ability to carry out ADLs; assess patient's  baseline for ADL function and identify physical deficits which impact ability to perform ADLs (bathing, care of mouth/teeth, toileting, grooming, dressing, etc.)  - Assess/evaluate cause of self-care deficits   - Assess range of motion  - Assess patient's mobility; develop plan if impaired  - Assess patient's need for assistive devices and provide as appropriate  - Encourage maximum independence but intervene and supervise when necessary  - Involve family in performance of ADLs  - Assess for home care needs following discharge   - Consider OT consult to assist with ADL evaluation and planning for discharge  - Provide patient education as appropriate  Outcome: Progressing  Goal: Maintains/Returns to pre admission functional level  Description: INTERVENTIONS:  - Perform AM-PAC 6 Click Basic Mobility/ Daily Activity assessment daily.  - Set and communicate daily mobility goal to care team and patient/family/caregiver.   - Collaborate with rehabilitation services on mobility goals if consulted  - Perform Range of Motion 3 times a day.  - Reposition patient every 2 hours.  - Dangle patient 3 times a day  - Stand patient 3 times a day  - Ambulate patient 3 times a day  - Out of bed to chair 3 times a day   - Out of bed for meals 3 times a day  - Out of bed for toileting  - Record patient progress and toleration of activity level   Outcome: Progressing     Problem: DISCHARGE PLANNING  Goal: Discharge to home or other facility with appropriate resources  Description: INTERVENTIONS:  - Identify barriers to discharge w/patient and caregiver  - Arrange for needed discharge resources and transportation as appropriate  - Identify discharge learning needs (meds, wound care, etc.)  - Arrange for interpretive services to assist at discharge as needed  - Refer to Case Management Department for coordinating discharge planning if the patient needs post-hospital services based on physician/advanced practitioner order or complex needs  related to functional status, cognitive ability, or social support system  Outcome: Progressing     Problem: Knowledge Deficit  Goal: Patient/family/caregiver demonstrates understanding of disease process, treatment plan, medications, and discharge instructions  Description: Complete learning assessment and assess knowledge base.  Interventions:  - Provide teaching at level of understanding  - Provide teaching via preferred learning methods  Outcome: Progressing

## 2024-01-17 NOTE — CASE MANAGEMENT
Case Management Progress Note    Patient name Zee Kirk  Location /-01 MRN [id number]  : 1950 Date 2024       LOS (days): 2  Geometric Mean LOS (GMLOS) (days): 3  Days to GMLOS:1.1        OBJECTIVE:        Current admission status: Inpatient  Preferred Pharmacy:   [facility] - [facility] [address]  [address] [facility] [address]  Phone: [phone] Fax: [phone]    [facility] - [facility] [address] [address]  Phone: [phone] Fax: [phone]    Primary Care Provider: Franklyn Caruso MD    Primary Insurance: MEDICARE  Secondary Insurance: [employer]    PROGRESS NOTE:  Cm received a call back from Robbie Richmond at 16:23pm- cm reviewed the d/c plan with  him.

## 2024-01-17 NOTE — ASSESSMENT & PLAN NOTE
Presented with drainage and swelling of her right abdomen  Had prior admission for same complaint with admission back in September 2023  Continue vancomycin, cefepime with prior wound cultures growing MRSA and Pseudomonas  Wound cultures pending, plan de-escalation of antibiotics pending culture results  Wound care consulted  Blood cultures no growth after 24 hours  Procalcitonin negative x 2   Leukocytosis resolved today  Remains afebrile  CBC a.m.

## 2024-01-17 NOTE — NUTRITION
01/17/24 1426   Biochemical Data,Medical Tests, and Procedures   Biochemical Data/Medical Tests/Procedures Lab values reviewed;Meds reviewed   Labs (Comment) 1/17/2024 BUN 41   Meds (Comment) Colace, Lasix, levothyroxine, warfarin   Nutrition-Focused Physical Exam   Nutrition-Focused Physical Exam Findings RN skin assessment reviewed;Wound   Nutrition-Focused Physical Exam Findings MASD at right medial abdomen, MASD at right posterior hip, MASD at left anterior pelvis, MASD at lower medial abdomen, lymphedema at right hip, wound at right back, wound at right lower breast, wound at left lower back, wound at left lateral thigh, MASD at left upper breast, wound at right lateral abdomen  (All noted per flowsheet documentation)   Medical-Related Concerns panniculitis, hypothyroidism, CHF, ambulatory dysfunction, GERD, gout, IBS, BMI 70 and over   Current PO Intake   Current Diet Order Regular, 4 g sodium diet, thin liquids   Current Meal Intake %   Estimated calorie intake compared to estimated need Nutrient needs are met   Recommendations/Interventions   Adult BMI Classifications Morbid Obesity > 70   Summary Wound Care RN consulted; high BMI.  Past medical history significant for panniculitis, hypothyroidism, CHF, ambulatory dysfunction, GERD, gout, IBS, BMI 70 and over. Food allergy to vitamin C noted. Weight history reviewed. No significant changes.  No edema.  Skin integrity as above. Prescribed a Regular diet, thin liquids.  Meal completion 100%.  Met with patient at bedside.  Patient is known to this RD from previous hospital admissions.  She reports her appetite is okay.  Usually has 2 meals daily.  Does not use salt at home.  Caregivers cook and grocery shop.  Denies dysphagia.  She reports she is bedbound at home.  Enjoys chicken, turkey, red meats.  Reports 78#weight loss in 1 year.  Also reports weight fluctuates with fluid status.  States weight loss as goal.  Confirms food allergy to vitamin C and  also reports food allergy to all gravy and pork -  experiences diarrhea.  Discussed diet as prescribed.  She offers no nutrition questions.  RD to follow as needed.   Interventions/Recommendations Continue current diet order   Education Assessment   Education Education not indicated at this time   Patient Nutrition Goals   Goal Avoid weight gain

## 2024-01-17 NOTE — DISCHARGE INSTR - OTHER ORDERS
Skin and Wound Care Plan:   1. Cleanse folds to the abdomen, breast, upper thigh and knee folds with Remedy foaming cleanser, pat dry. Dust Nystatin powder in b/l breast, b/l knee and left abdominal fold fold, place ABD in fold to control moisture, change daily and PRN. Mid abdominal fold and medial right abdominal fold wounds apply Kenalog-Triamcinolone cream and ABD in fold to control moisture, change daily and PRN  2. Apply skin nourishing cream to the skin daily  3. Hydraguard to bilateral sacrum, buttock and heels BID and PRN  4- Apply hydraguard to b/l hip lymphedema BID and PRN and place incontinence pads beneath each side to absorb drainage.

## 2024-01-18 ENCOUNTER — APPOINTMENT (INPATIENT)
Dept: NON INVASIVE DIAGNOSTICS | Facility: HOSPITAL | Age: 74
DRG: 607 | End: 2024-01-18
Payer: MEDICARE

## 2024-01-18 PROBLEM — R07.89 CHEST DISCOMFORT: Status: ACTIVE | Noted: 2024-01-18

## 2024-01-18 LAB
2HR DELTA HS TROPONIN: 0 NG/L
4HR DELTA HS TROPONIN: 0 NG/L
ANION GAP SERPL CALCULATED.3IONS-SCNC: 10 MMOL/L
AORTIC ROOT: 3.2 CM
APICAL FOUR CHAMBER EJECTION FRACTION: 60 %
ASCENDING AORTA: 3.4 CM
ATRIAL RATE: 59 BPM
BASOPHILS # BLD AUTO: 0.06 THOUSANDS/ÂΜL (ref 0–0.1)
BASOPHILS NFR BLD AUTO: 1 % (ref 0–1)
BSA FOR ECHO PROCEDURE: 2.65 M2
BUN SERPL-MCNC: 45 MG/DL (ref 5–25)
CALCIUM SERPL-MCNC: 8.6 MG/DL (ref 8.4–10.2)
CARDIAC TROPONIN I PNL SERPL HS: 7 NG/L
CHLORIDE SERPL-SCNC: 97 MMOL/L (ref 96–108)
CO2 SERPL-SCNC: 31 MMOL/L (ref 21–32)
CREAT SERPL-MCNC: 1.24 MG/DL (ref 0.6–1.3)
E WAVE DECELERATION TIME: 238 MS
EOSINOPHIL # BLD AUTO: 0.4 THOUSAND/ÂΜL (ref 0–0.61)
EOSINOPHIL NFR BLD AUTO: 3 % (ref 0–6)
ERYTHROCYTE [DISTWIDTH] IN BLOOD BY AUTOMATED COUNT: 16.7 % (ref 11.6–15.1)
FRACTIONAL SHORTENING: 37 (ref 28–44)
GFR SERPL CREATININE-BSD FRML MDRD: 43 ML/MIN/1.73SQ M
GLUCOSE SERPL-MCNC: 112 MG/DL (ref 65–140)
HCT VFR BLD AUTO: 39.7 % (ref 34.8–46.1)
HGB BLD-MCNC: 11.7 G/DL (ref 11.5–15.4)
IMM GRANULOCYTES # BLD AUTO: 0.04 THOUSAND/UL (ref 0–0.2)
IMM GRANULOCYTES NFR BLD AUTO: 0 % (ref 0–2)
INR PPP: 1.32 (ref 0.84–1.19)
INTERVENTRICULAR SEPTUM IN DIASTOLE (PARASTERNAL SHORT AXIS VIEW): 1.1 CM
INTERVENTRICULAR SEPTUM: 1.1 CM (ref 0.6–1.1)
IVC: 24 MM
LAAS-AP2: 12.7 CM2
LAAS-AP4: 26.6 CM2
LEFT ATRIUM SIZE: 4.5 CM
LEFT ATRIUM VOLUME (MOD BIPLANE): 65 ML
LEFT ATRIUM VOLUME INDEX (MOD BIPLANE): 24.5 ML/M2
LEFT INTERNAL DIMENSION IN SYSTOLE: 2.9 CM (ref 2.1–4)
LEFT VENTRICULAR INTERNAL DIMENSION IN DIASTOLE: 4.6 CM (ref 3.5–6)
LEFT VENTRICULAR POSTERIOR WALL IN END DIASTOLE: 1 CM
LEFT VENTRICULAR STROKE VOLUME: 67 ML
LVSV (TEICH): 67 ML
LYMPHOCYTES # BLD AUTO: 0.95 THOUSANDS/ÂΜL (ref 0.6–4.47)
LYMPHOCYTES NFR BLD AUTO: 7 % (ref 14–44)
MCH RBC QN AUTO: 24.7 PG (ref 26.8–34.3)
MCHC RBC AUTO-ENTMCNC: 29.5 G/DL (ref 31.4–37.4)
MCV RBC AUTO: 84 FL (ref 82–98)
MONOCYTES # BLD AUTO: 0.71 THOUSAND/ÂΜL (ref 0.17–1.22)
MONOCYTES NFR BLD AUTO: 6 % (ref 4–12)
MV PEAK E VEL: 96 CM/S
MV STENOSIS PRESSURE HALF TIME: 69 MS
MV VALVE AREA P 1/2 METHOD: 3.19
NEUTROPHILS # BLD AUTO: 10.81 THOUSANDS/ÂΜL (ref 1.85–7.62)
NEUTS SEG NFR BLD AUTO: 83 % (ref 43–75)
NRBC BLD AUTO-RTO: 0 /100 WBCS
PLATELET # BLD AUTO: 379 THOUSANDS/UL (ref 149–390)
PMV BLD AUTO: 8.9 FL (ref 8.9–12.7)
POTASSIUM SERPL-SCNC: 3.8 MMOL/L (ref 3.5–5.3)
PROTHROMBIN TIME: 16.6 SECONDS (ref 11.6–14.5)
QRS AXIS: 6 DEGREES
QRSD INTERVAL: 80 MS
QT INTERVAL: 330 MS
QTC INTERVAL: 334 MS
RBC # BLD AUTO: 4.73 MILLION/UL (ref 3.81–5.12)
RIGHT ATRIUM AREA SYSTOLE A4C: 21.7 CM2
RIGHT VENTRICLE ID DIMENSION: 3.9 CM
SL CV LEFT ATRIUM LENGTH A2C: 4 CM
SL CV LV EF: 55
SL CV PED ECHO LEFT VENTRICLE DIASTOLIC VOLUME (MOD BIPLANE) 2D: 98 ML
SL CV PED ECHO LEFT VENTRICLE SYSTOLIC VOLUME (MOD BIPLANE) 2D: 31 ML
SODIUM SERPL-SCNC: 138 MMOL/L (ref 135–147)
T WAVE AXIS: 77 DEGREES
TR MAX PG: 9 MMHG
TR PEAK VELOCITY: 1.5 M/S
TRICUSPID ANNULAR PLANE SYSTOLIC EXCURSION: 1.7 CM
TRICUSPID VALVE PEAK REGURGITATION VELOCITY: 1.49 M/S
VENTRICULAR RATE: 62 BPM
WBC # BLD AUTO: 12.97 THOUSAND/UL (ref 4.31–10.16)

## 2024-01-18 PROCEDURE — 80048 BASIC METABOLIC PNL TOTAL CA: CPT

## 2024-01-18 PROCEDURE — 93005 ELECTROCARDIOGRAM TRACING: CPT

## 2024-01-18 PROCEDURE — 93306 TTE W/DOPPLER COMPLETE: CPT | Performed by: INTERNAL MEDICINE

## 2024-01-18 PROCEDURE — 99233 SBSQ HOSP IP/OBS HIGH 50: CPT

## 2024-01-18 PROCEDURE — 85610 PROTHROMBIN TIME: CPT

## 2024-01-18 PROCEDURE — 85025 COMPLETE CBC W/AUTO DIFF WBC: CPT

## 2024-01-18 PROCEDURE — 84484 ASSAY OF TROPONIN QUANT: CPT | Performed by: HOSPITALIST

## 2024-01-18 PROCEDURE — C9113 INJ PANTOPRAZOLE SODIUM, VIA: HCPCS

## 2024-01-18 PROCEDURE — 93306 TTE W/DOPPLER COMPLETE: CPT

## 2024-01-18 RX ORDER — DIPHENHYDRAMINE HYDROCHLORIDE AND LIDOCAINE HYDROCHLORIDE AND ALUMINUM HYDROXIDE AND MAGNESIUM HYDRO
10 KIT ONCE
Status: COMPLETED | OUTPATIENT
Start: 2024-01-18 | End: 2024-01-18

## 2024-01-18 RX ORDER — WARFARIN SODIUM 5 MG/1
5 TABLET ORAL
Status: DISCONTINUED | OUTPATIENT
Start: 2024-01-18 | End: 2024-01-18

## 2024-01-18 RX ORDER — PANTOPRAZOLE SODIUM 40 MG/10ML
40 INJECTION, POWDER, LYOPHILIZED, FOR SOLUTION INTRAVENOUS
Status: DISCONTINUED | OUTPATIENT
Start: 2024-01-18 | End: 2024-01-19

## 2024-01-18 RX ORDER — WARFARIN SODIUM 3 MG/1
6 TABLET ORAL
Status: DISCONTINUED | OUTPATIENT
Start: 2024-01-18 | End: 2024-01-19 | Stop reason: HOSPADM

## 2024-01-18 RX ADMIN — DULOXETINE HYDROCHLORIDE 20 MG: 20 CAPSULE, DELAYED RELEASE ORAL at 08:54

## 2024-01-18 RX ADMIN — FUROSEMIDE 40 MG: 10 INJECTION, SOLUTION INTRAMUSCULAR; INTRAVENOUS at 15:35

## 2024-01-18 RX ADMIN — DIPHENHYDRAMINE HYDROCHLORIDE AND LIDOCAINE HYDROCHLORIDE AND ALUMINUM HYDROXIDE AND MAGNESIUM HYDRO 10 ML: KIT at 08:53

## 2024-01-18 RX ADMIN — GABAPENTIN 100 MG: 100 CAPSULE ORAL at 20:20

## 2024-01-18 RX ADMIN — TRIAMCINOLONE ACETONIDE: 1 CREAM TOPICAL at 17:26

## 2024-01-18 RX ADMIN — CEFEPIME HYDROCHLORIDE 2000 MG: 2 INJECTION, SOLUTION INTRAVENOUS at 20:20

## 2024-01-18 RX ADMIN — MIDODRINE HYDROCHLORIDE 2.5 MG: 5 TABLET ORAL at 15:34

## 2024-01-18 RX ADMIN — GABAPENTIN 100 MG: 100 CAPSULE ORAL at 15:35

## 2024-01-18 RX ADMIN — TRIAMCINOLONE ACETONIDE: 1 CREAM TOPICAL at 08:55

## 2024-01-18 RX ADMIN — GABAPENTIN 100 MG: 100 CAPSULE ORAL at 08:54

## 2024-01-18 RX ADMIN — LEVOTHYROXINE SODIUM 125 MCG: 25 TABLET ORAL at 06:08

## 2024-01-18 RX ADMIN — ALLOPURINOL 100 MG: 100 TABLET ORAL at 08:54

## 2024-01-18 RX ADMIN — NYSTATIN: 100000 POWDER TOPICAL at 17:26

## 2024-01-18 RX ADMIN — MIDODRINE HYDROCHLORIDE 2.5 MG: 5 TABLET ORAL at 11:55

## 2024-01-18 RX ADMIN — VANCOMYCIN HYDROCHLORIDE 1250 MG: 1 INJECTION, POWDER, LYOPHILIZED, FOR SOLUTION INTRAVENOUS at 15:34

## 2024-01-18 RX ADMIN — PANTOPRAZOLE SODIUM 40 MG: 40 INJECTION, POWDER, FOR SOLUTION INTRAVENOUS at 11:55

## 2024-01-18 RX ADMIN — NYSTATIN: 100000 POWDER TOPICAL at 08:55

## 2024-01-18 RX ADMIN — CEFEPIME HYDROCHLORIDE 2000 MG: 2 INJECTION, SOLUTION INTRAVENOUS at 08:54

## 2024-01-18 RX ADMIN — MIDODRINE HYDROCHLORIDE 2.5 MG: 5 TABLET ORAL at 06:08

## 2024-01-18 RX ADMIN — WARFARIN SODIUM 6 MG: 3 TABLET ORAL at 17:26

## 2024-01-18 NOTE — PROGRESS NOTES
Zee Kirk is a 73 y.o. female who is currently ordered Vancomycin IV with management by the Pharmacy Consult service.  Relevant clinical data and objective / subjective history reviewed.  Vancomycin Assessment:  Indication and Goal AUC/Trough: Soft tissue (goal -600, trough >10), -600, trough >10  Clinical Status: stable  Micro:     Renal Function:  SCr: 1.24 mg/dL  CrCl: 68.7 mL/min  Renal replacement: Not on dialysis  Days of Therapy: 4  Current Dose: 1250 mg iv q 24 hrs  Vancomycin Plan:  New Dosing: continie 1250 mg iv q 24 hrs  Estimated AUC: 458 mcg*hr/mL  Estimated Trough: 11.7 mcg/mL  Next Level: 1/24/24 @ 0600  Renal Function Monitoring: Daily BMP and UOP  Pharmacy will continue to follow closely for s/sx of nephrotoxicity, infusion reactions and appropriateness of therapy.  BMP and CBC will be ordered per protocol. We will continue to follow the patient’s culture results and clinical progress daily.    Cata Kumar, Pharmacist

## 2024-01-18 NOTE — ASSESSMENT & PLAN NOTE
Wt Readings from Last 3 Encounters:   01/18/24 (!) 185 kg (408 lb)   09/18/23 (!) 194 kg (426 lb 13 oz)   08/24/23 (!) 200 kg (439 lb 15.9 oz)     Not in exacerbation at this time  Difficult to discern volume status due to body habitus  Saturating well on room air  Status post IV Lasix, now transitioned back to home regime of 60 mg Lasix daily   Continue follow-up with PCP

## 2024-01-18 NOTE — PROGRESS NOTES
"[facility]  Progress Note  Name: Zee Kirk I  MRN: [id number]  Unit/Bed#: -01 I Date of Admission: 1/15/2024   Date of Service: 1/18/2024 I Hospital Day: 3    Assessment/Plan   * Panniculitis  Assessment & Plan  Presented with drainage and swelling of her right abdomen  Had prior admission for same complaint with admission back in September 2023  Continue vancomycin, cefepime with prior wound cultures growing MRSA and Pseudomonas  Wound cultures Proteus mirabilis, Staphylococcus aureus, Enterococcus faecalis, sensitivity results pending  Blood cultures no growth after 48 hours  Procalcitonin negative x 2   Mild leukocytosis today  Remains afebrile  Wound care following  CBC a.m.    Chest discomfort  Assessment & Plan  Complained of left-sided chest discomfort \" burning like indigestion\" started this a.m.  EKG A-fib heart rate 62  Had some relief from GI cocktail this a.m.  Initiated on Protonix daily  Serial troponins were negative x 3  Monitoring on telemetry  Obtained TTE: Limited study due to habitus, LVEF 55% with normal systolic function  Obtain EKG for any further complaints of chest pain    Chronic diastolic congestive heart failure (HCC)  Assessment & Plan  Wt Readings from Last 3 Encounters:   01/18/24 (!) 185 kg (408 lb)   09/18/23 (!) 194 kg (426 lb 13 oz)   08/24/23 (!) 200 kg (439 lb 15.9 oz)     Not in exacerbation at this time  Difficult to discern volume status due to body habitus  On 2 L nasal cannula with nonlabored respirations  Continue Lasix 40 mg IV twice daily through today  Will transition back to home regimen of 60 mg Lasix daily starting tomorrow  Monitor fluid status closely  Daily weight  Intake and output monitoring    Chronic atrial fibrillation (HCC)  Assessment & Plan  Patient is rate controlled at this time  Continue Coumadin-increased dose from 5 mg to 6 mg daily today  Check INR daily    Adjustment disorder  Assessment & Plan  Continue " "Cymbalta 20 mg p.o. daily    Other specified hypothyroidism  Assessment & Plan  Continue levothyroxine               VTE Pharmacologic Prophylaxis: VTE Score: 9 High Risk (Score >/= 5) - Pharmacological DVT Prophylaxis Ordered: warfarin (Coumadin). Sequential Compression Devices Ordered.    Mobility:   Basic Mobility Inpatient Raw Score: 8  -HLM Goal: 3: Sit at edge of bed  JH-HLM Achieved: 1: Laying in bed  HLM Goal NOT achieved. Continue with multidisciplinary rounding and encourage appropriate mobility to improve upon HLM goals.    Patient Centered Rounds: I performed bedside rounds with nursing staff today.   Discussions with Specialists or Other Care Team Provider: Nursing, case management    Education and Discussions with Family / Patient: Patient declined call to .     Total Time Spent on Date of Encounter in care of patient: 45 mins. This time was spent on one or more of the following: performing physical exam; counseling and coordination of care; obtaining or reviewing history; documenting in the medical record; reviewing/ordering tests, medications or procedures; communicating with other healthcare professionals and discussing with patient's family/caregivers.    Current Length of Stay: 3 day(s)  Current Patient Status: Inpatient   Certification Statement: The patient will continue to require additional inpatient hospital stay due to being treated with IV antibiotics for panniculitis history of MDRO requiring IV antibiotics, monitoring on telemetry started with chest discomfort today troponins negative x 3  Discharge Plan:  Plan will be discharged home with home health care when medically stable for discharge    Code Status: Level 1 - Full Code    Subjective:   Patient having some burning chest pain today \"like indigestion\", some improvement after GI cocktail.  Readily verbalizing needs.    Objective:     Vitals:   Temp (24hrs), Av.4 °F (36.9 °C), Min:97.8 °F (36.6 °C), Max:99.1 °F " (37.3 °C)    Temp:  [97.8 °F (36.6 °C)-99.1 °F (37.3 °C)] 99.1 °F (37.3 °C)  HR:  [61-77] 73  Resp:  [18-20] 18  BP: ()/(49-60) 118/60  SpO2:  [94 %-98 %] 95 %  Body mass index is 70.03 kg/m².     Input and Output Summary (last 24 hours):     Intake/Output Summary (Last 24 hours) at 1/18/2024 1631  Last data filed at 1/18/2024 0620  Gross per 24 hour   Intake 1620 ml   Output 1950 ml   Net -330 ml       Physical Exam:   Physical Exam  Vitals and nursing note reviewed.   Constitutional:       General: She is not in acute distress.     Appearance: She is well-developed. She is obese.   HENT:      Head: Normocephalic and atraumatic.      Mouth/Throat:      Mouth: Mucous membranes are moist.   Cardiovascular:      Rate and Rhythm: Normal rate. Rhythm irregular.      Pulses: Normal pulses.      Heart sounds: No murmur heard.     Comments: A-fib on telemetry heart rate 60s to 70s  Pulmonary:      Effort: Pulmonary effort is normal. No respiratory distress.      Breath sounds: Normal breath sounds. No wheezing or rales.   Abdominal:      General: Bowel sounds are normal. There is no distension.      Palpations: Abdomen is soft.      Tenderness: There is no abdominal tenderness. There is no guarding.   Musculoskeletal:         General: No swelling. Normal range of motion.   Skin:     General: Skin is warm and dry.      Capillary Refill: Capillary refill takes less than 2 seconds.   Neurological:      General: No focal deficit present.      Mental Status: She is alert and oriented to person, place, and time. Mental status is at baseline.   Psychiatric:         Mood and Affect: Mood normal.         Behavior: Behavior normal.         Thought Content: Thought content normal.         Judgment: Judgment normal.          Additional Data:     Labs:  Results from last 7 days   Lab Units 01/18/24  0436 01/17/24  0500 01/16/24  0549   WBC Thousand/uL 12.97*   < > 15.03*   HEMOGLOBIN g/dL 11.7   < > 11.1*   HEMATOCRIT % 39.7   <  > 37.2   PLATELETS Thousands/uL 379   < > 402*   BANDS PCT %  --   --  2   NEUTROS PCT % 83*   < >  --    LYMPHS PCT % 7*   < >  --    LYMPHO PCT %  --   --  4*   MONOS PCT % 6   < >  --    MONO PCT %  --   --  2*   EOS PCT % 3   < > 0    < > = values in this interval not displayed.     Results from last 7 days   Lab Units 01/18/24  0436 01/16/24  0549 01/15/24  1514   SODIUM mmol/L 138   < > 138   POTASSIUM mmol/L 3.8   < > 4.1   CHLORIDE mmol/L 97   < > 99   CO2 mmol/L 31   < > 32   BUN mg/dL 45*   < > 40*   CREATININE mg/dL 1.24   < > 1.12   ANION GAP mmol/L 10   < > 7   CALCIUM mg/dL 8.6   < > 9.2   ALBUMIN g/dL  --   --  3.1*   TOTAL BILIRUBIN mg/dL  --   --  0.60   ALK PHOS U/L  --   --  110*   ALT U/L  --   --  9   AST U/L  --   --  15   GLUCOSE RANDOM mg/dL 112   < > 108    < > = values in this interval not displayed.     Results from last 7 days   Lab Units 01/18/24  0436   INR  1.32*             Results from last 7 days   Lab Units 01/17/24  0500 01/15/24  1514   LACTIC ACID mmol/L  --  0.8   PROCALCITONIN ng/ml 0.05 <0.05       Lines/Drains:  Invasive Devices       Peripheral Intravenous Line  Duration             Peripheral IV 01/15/24 Proximal;Right;Ventral (anterior) Forearm 3 days              Drain  Duration             External Urinary Catheter <1 day                          Imaging: Reviewed radiology reports from this admission including: chest xray and abdominal/pelvic CT    Recent Cultures (last 7 days):   Results from last 7 days   Lab Units 01/16/24  1525 01/15/24  1514   BLOOD CULTURE   --  No Growth at 48 hrs.  No Growth at 48 hrs.   GRAM STAIN RESULT  No polys seen*  3+ Gram positive cocci in pairs*  3+ Gram negative rods*  2+ Gram positive rods*  --    WOUND CULTURE  4+ Growth of Proteus mirabilis*  4+ Growth of Acinetobacter baumannii*  4+ Growth of Staphylococcus aureus*  4+ Growth of Enterococcus faecalis*  --        Last 24 Hours Medication List:   Current Facility-Administered  Medications   Medication Dose Route Frequency Provider Last Rate    acetaminophen  650 mg Oral Q6H PRN Anup Hammer PA-C      allopurinol  100 mg Oral Daily Anup Hammer PA-C      cefepime  2,000 mg Intravenous Q12H ROSMERY StrangeC 2,000 mg (01/18/24 0854)    docusate sodium  100 mg Oral Daily Anup Hammer PA-C      DULoxetine  20 mg Oral Daily Anup Hammer PA-C      furosemide  40 mg Intravenous BID (diuretic) Anup Hammer PA-C      gabapentin  100 mg Oral TID Anup Hammer PA-C      levothyroxine  125 mcg Oral Early Morning Anup Hammer PA-C      midodrine  2.5 mg Oral TID AC Anup Hammer PA-C      nystatin   Topical BID Anup Hammer PA-C      ondansetron  4 mg Intravenous Q6H PRN Anup Hammer PA-C      pantoprazole  40 mg Intravenous Q24H [facility] ORACIO Last      triamcinolone   Topical BID ORACIO Last      vancomycin  1,250 mg Intravenous Q24H Anup Hammer PA-C 1,250 mg (01/18/24 1534)    warfarin  6 mg Oral Daily (warfarin) ORACIO Last          Today, Patient Was Seen By: ORACIO Last    **Please Note: This note may have been constructed using a voice recognition system.**

## 2024-01-18 NOTE — ASSESSMENT & PLAN NOTE
"Complained of left-sided chest discomfort \" burning like indigestion.\"  No longer present   EKG A-fib heart rate 62, without evidence of infarct or ischemia  Serial troponins were negative x 3   TTE: Limited study due to habitus, LVEF 55% with normal systolic function  Continue Protonix daily    "

## 2024-01-18 NOTE — ASSESSMENT & PLAN NOTE
Presented with drainage and swelling of her right abdomen  Had prior admission for same complaint with admission back in September 2023  Cefepime IV transitioned to clindamycin per ID   S/p vancomycin IV   Wound cultures sensitivity results reviewed  Blood cultures no growth after 48 hours  Procalcitonin negative x 2   Afebrile and without leukocytosis  No erythema noted on exam
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
## 2024-01-18 NOTE — ASSESSMENT & PLAN NOTE
Patient is rate controlled at this time  Continue Coumadin-increased dose to 3mg daily  Check INR Monday via home care  Continue follow up with PCP

## 2024-01-19 VITALS
OXYGEN SATURATION: 95 % | WEIGHT: 293 LBS | DIASTOLIC BLOOD PRESSURE: 48 MMHG | TEMPERATURE: 97.9 F | HEART RATE: 62 BPM | SYSTOLIC BLOOD PRESSURE: 102 MMHG | RESPIRATION RATE: 18 BRPM | BODY MASS INDEX: 50.02 KG/M2 | HEIGHT: 64 IN

## 2024-01-19 LAB
ANION GAP SERPL CALCULATED.3IONS-SCNC: 8 MMOL/L
BASOPHILS # BLD AUTO: 0.04 THOUSANDS/ÂΜL (ref 0–0.1)
BASOPHILS NFR BLD AUTO: 0 % (ref 0–1)
BUN SERPL-MCNC: 43 MG/DL (ref 5–25)
CALCIUM SERPL-MCNC: 8.5 MG/DL (ref 8.4–10.2)
CHLORIDE SERPL-SCNC: 98 MMOL/L (ref 96–108)
CO2 SERPL-SCNC: 28 MMOL/L (ref 21–32)
CREAT SERPL-MCNC: 1.15 MG/DL (ref 0.6–1.3)
EOSINOPHIL # BLD AUTO: 0.28 THOUSAND/ÂΜL (ref 0–0.61)
EOSINOPHIL NFR BLD AUTO: 3 % (ref 0–6)
ERYTHROCYTE [DISTWIDTH] IN BLOOD BY AUTOMATED COUNT: 16.9 % (ref 11.6–15.1)
GFR SERPL CREATININE-BSD FRML MDRD: 47 ML/MIN/1.73SQ M
GLUCOSE SERPL-MCNC: 108 MG/DL (ref 65–140)
HCT VFR BLD AUTO: 36.4 % (ref 34.8–46.1)
HGB BLD-MCNC: 10.9 G/DL (ref 11.5–15.4)
IMM GRANULOCYTES # BLD AUTO: 0.03 THOUSAND/UL (ref 0–0.2)
IMM GRANULOCYTES NFR BLD AUTO: 0 % (ref 0–2)
INR PPP: 1.65 (ref 0.84–1.19)
LYMPHOCYTES # BLD AUTO: 0.97 THOUSANDS/ÂΜL (ref 0.6–4.47)
LYMPHOCYTES NFR BLD AUTO: 10 % (ref 14–44)
MCH RBC QN AUTO: 24.8 PG (ref 26.8–34.3)
MCHC RBC AUTO-ENTMCNC: 29.9 G/DL (ref 31.4–37.4)
MCV RBC AUTO: 83 FL (ref 82–98)
MONOCYTES # BLD AUTO: 0.71 THOUSAND/ÂΜL (ref 0.17–1.22)
MONOCYTES NFR BLD AUTO: 7 % (ref 4–12)
NEUTROPHILS # BLD AUTO: 7.83 THOUSANDS/ÂΜL (ref 1.85–7.62)
NEUTS SEG NFR BLD AUTO: 80 % (ref 43–75)
NRBC BLD AUTO-RTO: 0 /100 WBCS
PLATELET # BLD AUTO: 339 THOUSANDS/UL (ref 149–390)
PMV BLD AUTO: 9 FL (ref 8.9–12.7)
POTASSIUM SERPL-SCNC: 3.8 MMOL/L (ref 3.5–5.3)
PROTHROMBIN TIME: 19.7 SECONDS (ref 11.6–14.5)
RBC # BLD AUTO: 4.39 MILLION/UL (ref 3.81–5.12)
SODIUM SERPL-SCNC: 134 MMOL/L (ref 135–147)
WBC # BLD AUTO: 9.86 THOUSAND/UL (ref 4.31–10.16)

## 2024-01-19 PROCEDURE — 85025 COMPLETE CBC W/AUTO DIFF WBC: CPT

## 2024-01-19 PROCEDURE — 85610 PROTHROMBIN TIME: CPT

## 2024-01-19 PROCEDURE — 99239 HOSP IP/OBS DSCHRG MGMT >30: CPT | Performed by: NURSE PRACTITIONER

## 2024-01-19 PROCEDURE — 80048 BASIC METABOLIC PNL TOTAL CA: CPT

## 2024-01-19 RX ORDER — PANTOPRAZOLE SODIUM 40 MG/1
40 TABLET, DELAYED RELEASE ORAL
Qty: 30 TABLET | Refills: 0 | Status: SHIPPED | OUTPATIENT
Start: 2024-01-20 | End: 2024-02-19

## 2024-01-19 RX ORDER — PANTOPRAZOLE SODIUM 40 MG/1
40 TABLET, DELAYED RELEASE ORAL
Status: DISCONTINUED | OUTPATIENT
Start: 2024-01-19 | End: 2024-01-19 | Stop reason: HOSPADM

## 2024-01-19 RX ORDER — WARFARIN SODIUM 3 MG/1
TABLET ORAL
Qty: 30 TABLET | Refills: 0 | Status: SHIPPED | OUTPATIENT
Start: 2024-01-19 | End: 2024-01-19

## 2024-01-19 RX ORDER — WARFARIN SODIUM 2 MG/1
TABLET ORAL
Qty: 20 TABLET | Refills: 0 | Status: SHIPPED | OUTPATIENT
Start: 2024-01-19

## 2024-01-19 RX ORDER — CLINDAMYCIN HYDROCHLORIDE 300 MG/1
300 CAPSULE ORAL 4 TIMES DAILY
Qty: 8 CAPSULE | Refills: 0 | Status: SHIPPED | OUTPATIENT
Start: 2024-01-19 | End: 2024-01-19

## 2024-01-19 RX ADMIN — MIDODRINE HYDROCHLORIDE 2.5 MG: 5 TABLET ORAL at 08:01

## 2024-01-19 RX ADMIN — GABAPENTIN 100 MG: 100 CAPSULE ORAL at 08:58

## 2024-01-19 RX ADMIN — ALLOPURINOL 100 MG: 100 TABLET ORAL at 08:58

## 2024-01-19 RX ADMIN — PANTOPRAZOLE SODIUM 40 MG: 40 TABLET, DELAYED RELEASE ORAL at 08:59

## 2024-01-19 RX ADMIN — CEFEPIME HYDROCHLORIDE 2000 MG: 2 INJECTION, SOLUTION INTRAVENOUS at 08:01

## 2024-01-19 RX ADMIN — NYSTATIN: 100000 POWDER TOPICAL at 09:00

## 2024-01-19 RX ADMIN — DULOXETINE HYDROCHLORIDE 20 MG: 20 CAPSULE, DELAYED RELEASE ORAL at 08:59

## 2024-01-19 RX ADMIN — TRIAMCINOLONE ACETONIDE: 1 CREAM TOPICAL at 09:00

## 2024-01-19 RX ADMIN — LEVOTHYROXINE SODIUM 125 MCG: 25 TABLET ORAL at 05:04
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
## 2024-01-19 NOTE — PROGRESS NOTES
Zee Kirk is a 73 y.o. female who is currently ordered Vancomycin IV with management by the Pharmacy Consult service.  Relevant clinical data and objective / subjective history reviewed.  Vancomycin Assessment:  Indication and Goal AUC/Trough: Soft tissue (goal -600, trough >10), -600, trough >10  Clinical Status: stable  Micro:     Renal Function:  SCr: 1.15 mg/dL  CrCl: 75.4 mL/min  Renal replacement: Not on dialysis  Days of Therapy: 5  Current Dose: 1250 mg iv q 24 hrs  Vancomycin Plan:  New Dosing: Continue 1250mg iv q24h  Estimated AUC: 424 mcg*hr/mL  Estimated Trough: 10.3 mcg/mL  Next Level: 01/24/24 @0600  Renal Function Monitoring: Daily BMP and UOP  Pharmacy will continue to follow closely for s/sx of nephrotoxicity, infusion reactions and appropriateness of therapy.  BMP and CBC will be ordered per protocol. We will continue to follow the patient’s culture results and clinical progress daily.    Cecilia Ramirez, Pharmacist

## 2024-01-19 NOTE — NURSING NOTE
Patient being discharged to home with home health. Tranpsort team here and discussed plan for safe transport. Patient has all discharge instructions and follow up, including med changes. Patient care is waiting for patient at house. Masimo and IV removed. Patient has all belongings.
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
## 2024-01-19 NOTE — PLAN OF CARE
Problem: Prexisting or High Potential for Compromised Skin Integrity  Goal: Skin integrity is maintained or improved  Description: INTERVENTIONS:  - Identify patients at risk for skin breakdown  - Assess and monitor skin integrity  - Assess and monitor nutrition and hydration status  - Monitor labs   - Assess for incontinence   - Turn and reposition patient  - Assist with mobility/ambulation  - Relieve pressure over bony prominences  - Avoid friction and shearing  - Provide appropriate hygiene as needed including keeping skin clean and dry  - Evaluate need for skin moisturizer/barrier cream  - Collaborate with interdisciplinary team   - Patient/family teaching  - Consider wound care consult   Outcome: Progressing     Problem: Potential for Falls  Goal: Patient will remain free of falls  Description: INTERVENTIONS:  - Educate patient/family on patient safety including physical limitations  - Instruct patient to call for assistance with activity   - Consult OT/PT to assist with strengthening/mobility   - Keep Call bell within reach  - Keep bed low and locked with side rails adjusted as appropriate  - Keep care items and personal belongings within reach  - Initiate and maintain comfort rounds  - Make Fall Risk Sign visible to staff  - Offer Toileting every 2 Hours, in advance of need  - Initiate/Maintain bedalarm  - Obtain necessary fall risk management equipment: socks/ alarm  - Apply yellow socks and bracelet for high fall risk patients  - Consider moving patient to room near nurses station  Outcome: Progressing

## 2024-01-20 ENCOUNTER — HOME CARE VISIT (OUTPATIENT)
Dept: HOME HEALTH SERVICES | Facility: HOME HEALTHCARE | Age: 74
End: 2024-01-20
Payer: MEDICARE

## 2024-01-20 VITALS
OXYGEN SATURATION: 96 % | HEART RATE: 61 BPM | TEMPERATURE: 96.7 F | SYSTOLIC BLOOD PRESSURE: 114 MMHG | DIASTOLIC BLOOD PRESSURE: 62 MMHG | RESPIRATION RATE: 18 BRPM

## 2024-01-20 LAB
BACTERIA BLD CULT: NORMAL
BACTERIA BLD CULT: NORMAL
BACTERIA WND AEROBE CULT: ABNORMAL
GRAM STN SPEC: ABNORMAL

## 2024-01-20 PROCEDURE — 400013 VN SOC

## 2024-01-20 PROCEDURE — 10330081 VN NO-PAY CLAIM PROCEDURE

## 2024-01-20 PROCEDURE — G0299 HHS/HOSPICE OF RN EA 15 MIN: HCPCS

## 2024-01-20 NOTE — CASE COMMUNICATION
[facility] has admitted your patient to Home Health service with the following disciplines:      PT, MSW and SN  response via inbasket  Primary focus of home health care. integumentary  Patient stated goals of care. remain at home and wound to heal  Anticipated visit pattern and next visit date. 1w1. 2w3.. 1w4  next visit tuesday  See medication list - meds in home differ from AVS. all meds at home except anbx which will be   by nephew  Significant clinical findings. weakness, fatigue,   Potential barriers to goal achievement. got verbal order for wound care to referring  Other pertinent information.     requesting MSW for transportation issues, and PT for evaluation of strength . thank you    Thank you for allowing us to participate in the care of your patient.

## 2024-01-21 ENCOUNTER — HOME CARE VISIT (OUTPATIENT)
Dept: HOME HEALTH SERVICES | Facility: HOME HEALTHCARE | Age: 74
End: 2024-01-21
Payer: MEDICARE

## 2024-01-22 PROCEDURE — 10330064 PAD, ABD 5X9" STR LF (1/PK 20PK/BX) MGM1

## 2024-01-22 PROCEDURE — 10330064 SPONGE, GAUZE 8PLY N/S 4"X4" (200/PK 20P

## 2024-01-22 PROCEDURE — 10330064 CLEANSER, WND SEA-CLEANS 6OZ  COLPLT

## 2024-01-22 NOTE — CASE COMMUNICATION
Ship to  Pt. Home       [facility] [place] [id number] . 1 (      Gauze 4x4 N/S 200 4x4s per unit  [id number] . 1   ABD 5x9 [id number] . 40   Surgery

## 2024-01-23 ENCOUNTER — HOME CARE VISIT (OUTPATIENT)
Dept: HOME HEALTH SERVICES | Facility: HOME HEALTHCARE | Age: 74
End: 2024-01-23
Payer: MEDICARE

## 2024-01-23 PROCEDURE — G0299 HHS/HOSPICE OF RN EA 15 MIN: HCPCS

## 2024-01-25 ENCOUNTER — HOME CARE VISIT (OUTPATIENT)
Dept: HOME HEALTH SERVICES | Facility: HOME HEALTHCARE | Age: 74
End: 2024-01-25
Payer: MEDICARE

## 2024-01-26 ENCOUNTER — HOME CARE VISIT (OUTPATIENT)
Dept: HOME HEALTH SERVICES | Facility: HOME HEALTHCARE | Age: 74
End: 2024-01-26
Payer: MEDICARE

## 2024-01-26 PROCEDURE — G0299 HHS/HOSPICE OF RN EA 15 MIN: HCPCS

## 2024-01-29 PROCEDURE — G0180 MD CERTIFICATION HHA PATIENT: HCPCS | Performed by: INTERNAL MEDICINE

## 2024-01-30 ENCOUNTER — HOME CARE VISIT (OUTPATIENT)
Dept: HOME HEALTH SERVICES | Facility: HOME HEALTHCARE | Age: 74
End: 2024-01-30
Payer: MEDICARE

## 2024-01-30 VITALS
TEMPERATURE: 97.2 F | RESPIRATION RATE: 20 BRPM | SYSTOLIC BLOOD PRESSURE: 130 MMHG | DIASTOLIC BLOOD PRESSURE: 76 MMHG | HEART RATE: 76 BPM | OXYGEN SATURATION: 97 %

## 2024-01-30 VITALS
RESPIRATION RATE: 20 BRPM | HEART RATE: 72 BPM | DIASTOLIC BLOOD PRESSURE: 68 MMHG | TEMPERATURE: 98.1 F | OXYGEN SATURATION: 97 % | SYSTOLIC BLOOD PRESSURE: 116 MMHG

## 2024-01-30 VITALS
SYSTOLIC BLOOD PRESSURE: 104 MMHG | OXYGEN SATURATION: 97 % | HEART RATE: 60 BPM | RESPIRATION RATE: 20 BRPM | DIASTOLIC BLOOD PRESSURE: 64 MMHG | TEMPERATURE: 97.2 F

## 2024-01-30 PROCEDURE — G0300 HHS/HOSPICE OF LPN EA 15 MIN: HCPCS

## 2024-02-02 ENCOUNTER — TELEPHONE (OUTPATIENT)
Dept: HOME HEALTH SERVICES | Facility: HOME HEALTHCARE | Age: 74
End: 2024-02-02

## 2024-02-02 ENCOUNTER — HOME CARE VISIT (OUTPATIENT)
Dept: HOME HEALTH SERVICES | Facility: HOME HEALTHCARE | Age: 74
End: 2024-02-02
Payer: MEDICARE

## 2024-02-02 PROCEDURE — G0155 HHCP-SVS OF CSW,EA 15 MIN: HCPCS

## 2024-02-06 ENCOUNTER — HOME CARE VISIT (OUTPATIENT)
Dept: HOME HEALTH SERVICES | Facility: HOME HEALTHCARE | Age: 74
End: 2024-02-06
Payer: MEDICARE

## 2024-02-06 VITALS
SYSTOLIC BLOOD PRESSURE: 120 MMHG | OXYGEN SATURATION: 97 % | TEMPERATURE: 97.2 F | DIASTOLIC BLOOD PRESSURE: 76 MMHG | RESPIRATION RATE: 20 BRPM | HEART RATE: 62 BPM

## 2024-02-06 PROCEDURE — 10330064 CLEANSER, WND SEA-CLEANS 6OZ  COLPLT

## 2024-02-06 PROCEDURE — 10330064 SPONGE, GAUZE 8PLY N/S 4"X4" (200/PK 20P

## 2024-02-06 PROCEDURE — G0300 HHS/HOSPICE OF LPN EA 15 MIN: HCPCS

## 2024-02-06 PROCEDURE — 10330064 PAD, ABD 5X9" STR LF (1/PK 20PK/BX) MGM1

## 2024-02-07 ENCOUNTER — HOME CARE VISIT (OUTPATIENT)
Dept: HOME HEALTH SERVICES | Facility: HOME HEALTHCARE | Age: 74
End: 2024-02-07
Payer: MEDICARE

## 2024-02-07 VITALS — OXYGEN SATURATION: 96 % | DIASTOLIC BLOOD PRESSURE: 68 MMHG | HEART RATE: 60 BPM | SYSTOLIC BLOOD PRESSURE: 118 MMHG

## 2024-02-07 PROCEDURE — G0151 HHCP-SERV OF PT,EA 15 MIN: HCPCS

## 2024-02-07 NOTE — CASE COMMUNICATION
PT evaluation completed. Patient with long standing immobility and has been bedbound for several years. She reports goal is to get stronger and describes several surgical proceedures she desires to undergo in effort to improve her mobility. She has participated in therapy multiple times and reports she has an exercise routine memorized. She demonstrates maxA of 2 people to transition to and from seated on side of bed and fatigues rapidl y when seated. Discussed goals and reasonable mobility prognosis in timeframe of [facility]. Agreed to work to finalize HEP and to work to improve mobility to allow assist on 1 person to obtain seated on side of bed. Goals modest but anticipation is to see patient 2 x week for 4 weeks in effort to obtain above goals.

## 2024-02-09 ENCOUNTER — HOME CARE VISIT (OUTPATIENT)
Dept: HOME HEALTH SERVICES | Facility: HOME HEALTHCARE | Age: 74
End: 2024-02-09
Payer: MEDICARE

## 2024-02-09 VITALS — SYSTOLIC BLOOD PRESSURE: 98 MMHG | DIASTOLIC BLOOD PRESSURE: 64 MMHG | HEART RATE: 56 BPM | OXYGEN SATURATION: 98 %

## 2024-02-09 PROCEDURE — G0151 HHCP-SERV OF PT,EA 15 MIN: HCPCS

## 2024-02-09 PROCEDURE — G0299 HHS/HOSPICE OF RN EA 15 MIN: HCPCS

## 2024-02-13 VITALS
DIASTOLIC BLOOD PRESSURE: 80 MMHG | HEART RATE: 76 BPM | RESPIRATION RATE: 20 BRPM | OXYGEN SATURATION: 97 % | SYSTOLIC BLOOD PRESSURE: 128 MMHG | TEMPERATURE: 97.2 F

## 2024-02-14 ENCOUNTER — HOME CARE VISIT (OUTPATIENT)
Dept: HOME HEALTH SERVICES | Facility: HOME HEALTHCARE | Age: 74
End: 2024-02-14
Payer: MEDICARE

## 2024-02-14 VITALS
RESPIRATION RATE: 20 BRPM | HEART RATE: 82 BPM | DIASTOLIC BLOOD PRESSURE: 68 MMHG | SYSTOLIC BLOOD PRESSURE: 116 MMHG | OXYGEN SATURATION: 99 % | TEMPERATURE: 97.7 F

## 2024-02-14 PROCEDURE — G0151 HHCP-SERV OF PT,EA 15 MIN: HCPCS

## 2024-02-14 PROCEDURE — G0299 HHS/HOSPICE OF RN EA 15 MIN: HCPCS

## 2024-02-16 ENCOUNTER — HOME CARE VISIT (OUTPATIENT)
Dept: HOME HEALTH SERVICES | Facility: HOME HEALTHCARE | Age: 74
End: 2024-02-16
Payer: MEDICARE

## 2024-02-16 VITALS — HEART RATE: 78 BPM | SYSTOLIC BLOOD PRESSURE: 112 MMHG | DIASTOLIC BLOOD PRESSURE: 68 MMHG | OXYGEN SATURATION: 95 %

## 2024-02-16 PROCEDURE — G0151 HHCP-SERV OF PT,EA 15 MIN: HCPCS

## 2024-02-20 ENCOUNTER — HOME CARE VISIT (OUTPATIENT)
Dept: HOME HEALTH SERVICES | Facility: HOME HEALTHCARE | Age: 74
End: 2024-02-20
Payer: MEDICARE

## 2024-02-20 VITALS
RESPIRATION RATE: 20 BRPM | DIASTOLIC BLOOD PRESSURE: 68 MMHG | TEMPERATURE: 96.7 F | OXYGEN SATURATION: 97 % | HEART RATE: 64 BPM | SYSTOLIC BLOOD PRESSURE: 132 MMHG

## 2024-02-20 PROCEDURE — G0299 HHS/HOSPICE OF RN EA 15 MIN: HCPCS

## 2024-02-21 ENCOUNTER — HOME CARE VISIT (OUTPATIENT)
Dept: HOME HEALTH SERVICES | Facility: HOME HEALTHCARE | Age: 74
End: 2024-02-21
Payer: MEDICARE

## 2024-02-23 ENCOUNTER — HOME CARE VISIT (OUTPATIENT)
Dept: HOME HEALTH SERVICES | Facility: HOME HEALTHCARE | Age: 74
End: 2024-02-23
Payer: MEDICARE

## 2024-02-23 PROCEDURE — G0151 HHCP-SERV OF PT,EA 15 MIN: HCPCS

## 2024-02-26 ENCOUNTER — HOME CARE VISIT (OUTPATIENT)
Dept: HOME HEALTH SERVICES | Facility: HOME HEALTHCARE | Age: 74
End: 2024-02-26
Payer: MEDICARE

## 2024-02-26 VITALS — HEART RATE: 63 BPM | SYSTOLIC BLOOD PRESSURE: 128 MMHG | DIASTOLIC BLOOD PRESSURE: 78 MMHG | OXYGEN SATURATION: 98 %

## 2024-02-26 PROCEDURE — G0151 HHCP-SERV OF PT,EA 15 MIN: HCPCS

## 2024-02-26 NOTE — CASE COMMUNICATION
Patient reassessed today for progress and further POC. Patient is demonstrating improved LE strength. This has not yet resulted in ability to transition to side of bed which is a primary goal. Additionally, bed mobility training has been very limited last 3 visits as her bed structure is compromised. She is in the process of obtaining a new and she believes will be delivered this week.  Will hold therapy until next week then contiue 2 x  weeks for 2 more weeks.

## 2024-02-28 ENCOUNTER — HOME CARE VISIT (OUTPATIENT)
Dept: HOME HEALTH SERVICES | Facility: HOME HEALTHCARE | Age: 74
End: 2024-02-28
Payer: MEDICARE

## 2024-02-28 VITALS
TEMPERATURE: 96.4 F | RESPIRATION RATE: 22 BRPM | OXYGEN SATURATION: 98 % | DIASTOLIC BLOOD PRESSURE: 62 MMHG | SYSTOLIC BLOOD PRESSURE: 110 MMHG | HEART RATE: 56 BPM

## 2024-02-28 PROCEDURE — 10330064 SPONGE, GAUZE 8PLY N/S 4"X4" (200/PK 20P

## 2024-02-28 PROCEDURE — G0299 HHS/HOSPICE OF RN EA 15 MIN: HCPCS

## 2024-02-28 PROCEDURE — 10330064 PAD, ABD 5X9" STR LF (1/PK 20PK/BX) MGM1

## 2024-03-01 ENCOUNTER — APPOINTMENT (OUTPATIENT)
Age: 74
End: 2024-03-01
Payer: MEDICARE

## 2024-03-01 ENCOUNTER — HOME CARE VISIT (OUTPATIENT)
Dept: HOME HEALTH SERVICES | Facility: HOME HEALTHCARE | Age: 74
End: 2024-03-01
Payer: MEDICARE

## 2024-03-01 VITALS
DIASTOLIC BLOOD PRESSURE: 74 MMHG | SYSTOLIC BLOOD PRESSURE: 120 MMHG | RESPIRATION RATE: 22 BRPM | TEMPERATURE: 97.7 F | HEART RATE: 52 BPM | OXYGEN SATURATION: 99 %

## 2024-03-01 DIAGNOSIS — I50.32 CHRONIC DIASTOLIC CONGESTIVE HEART FAILURE (HCC): Chronic | ICD-10-CM

## 2024-03-01 DIAGNOSIS — I48.20 CHRONIC ATRIAL FIBRILLATION (HCC): Chronic | ICD-10-CM

## 2024-03-01 DIAGNOSIS — Z79.01 CHRONIC ANTICOAGULATION: ICD-10-CM

## 2024-03-01 PROCEDURE — G0299 HHS/HOSPICE OF RN EA 15 MIN: HCPCS

## 2024-03-04 ENCOUNTER — HOSPITAL ENCOUNTER (EMERGENCY)
Facility: HOSPITAL | Age: 74
Discharge: HOME | End: 2024-03-05
Attending: INTERNAL MEDICINE
Payer: MEDICARE

## 2024-03-04 DIAGNOSIS — L03.818 CELLULITIS OF OTHER SPECIFIED SITE: Primary | ICD-10-CM

## 2024-03-04 LAB
ALBUMIN SERPL BCP-MCNC: 3 G/DL (ref 3.5–5)
ALP SERPL-CCNC: 105 U/L (ref 34–104)
ALT SERPL W P-5'-P-CCNC: 14 U/L (ref 7–52)
ANION GAP SERPL CALCULATED.3IONS-SCNC: 6 MMOL/L
APTT PPP: 39 SECONDS (ref 23–37)
AST SERPL W P-5'-P-CCNC: 26 U/L (ref 13–39)
BASOPHILS # BLD AUTO: 0.02 THOUSANDS/ÂΜL (ref 0–0.1)
BASOPHILS NFR BLD AUTO: 0 % (ref 0–1)
BILIRUB SERPL-MCNC: 0.76 MG/DL (ref 0.2–1)
BUN SERPL-MCNC: 31 MG/DL (ref 5–25)
CALCIUM ALBUM COR SERPL-MCNC: 9.5 MG/DL (ref 8.3–10.1)
CALCIUM SERPL-MCNC: 8.7 MG/DL (ref 8.4–10.2)
CHLORIDE SERPL-SCNC: 96 MMOL/L (ref 96–108)
CO2 SERPL-SCNC: 33 MMOL/L (ref 21–32)
CREAT SERPL-MCNC: 1.05 MG/DL (ref 0.6–1.3)
EOSINOPHIL # BLD AUTO: 0.03 THOUSAND/ÂΜL (ref 0–0.61)
EOSINOPHIL NFR BLD AUTO: 1 % (ref 0–6)
ERYTHROCYTE [DISTWIDTH] IN BLOOD BY AUTOMATED COUNT: 18 % (ref 11.6–15.1)
GFR SERPL CREATININE-BSD FRML MDRD: 53 ML/MIN/1.73SQ M
GLUCOSE SERPL-MCNC: 89 MG/DL (ref 65–140)
HCT VFR BLD AUTO: 39.2 % (ref 34.8–46.1)
HGB BLD-MCNC: 11.6 G/DL (ref 11.5–15.4)
IMM GRANULOCYTES # BLD AUTO: 0.03 THOUSAND/UL (ref 0–0.2)
IMM GRANULOCYTES NFR BLD AUTO: 1 % (ref 0–2)
INR PPP: 1.51 (ref 0.84–1.19)
LACTATE SERPL-SCNC: 0.6 MMOL/L (ref 0.5–2)
LYMPHOCYTES # BLD AUTO: 0.69 THOUSANDS/ÂΜL (ref 0.6–4.47)
LYMPHOCYTES NFR BLD AUTO: 14 % (ref 14–44)
MCH RBC QN AUTO: 25.3 PG (ref 26.8–34.3)
MCHC RBC AUTO-ENTMCNC: 29.6 G/DL (ref 31.4–37.4)
MCV RBC AUTO: 86 FL (ref 82–98)
MONOCYTES # BLD AUTO: 0.35 THOUSAND/ÂΜL (ref 0.17–1.22)
MONOCYTES NFR BLD AUTO: 7 % (ref 4–12)
NEUTROPHILS # BLD AUTO: 3.95 THOUSANDS/ÂΜL (ref 1.85–7.62)
NEUTS SEG NFR BLD AUTO: 77 % (ref 43–75)
NRBC BLD AUTO-RTO: 0 /100 WBCS
PLATELET # BLD AUTO: 362 THOUSANDS/UL (ref 149–390)
PMV BLD AUTO: 8.8 FL (ref 8.9–12.7)
POTASSIUM SERPL-SCNC: 3.5 MMOL/L (ref 3.5–5.3)
PROT SERPL-MCNC: 7.7 G/DL (ref 6.4–8.4)
PROTHROMBIN TIME: 18.2 SECONDS (ref 11.6–14.5)
RBC # BLD AUTO: 4.58 MILLION/UL (ref 3.81–5.12)
SODIUM SERPL-SCNC: 135 MMOL/L (ref 135–147)
WBC # BLD AUTO: 5.07 THOUSAND/UL (ref 4.31–10.16)

## 2024-03-04 PROCEDURE — 83605 ASSAY OF LACTIC ACID: CPT | Performed by: INTERNAL MEDICINE

## 2024-03-04 PROCEDURE — 80053 COMPREHEN METABOLIC PANEL: CPT | Performed by: INTERNAL MEDICINE

## 2024-03-04 PROCEDURE — 99284 EMERGENCY DEPT VISIT MOD MDM: CPT

## 2024-03-04 PROCEDURE — 85730 THROMBOPLASTIN TIME PARTIAL: CPT | Performed by: INTERNAL MEDICINE

## 2024-03-04 PROCEDURE — 99284 EMERGENCY DEPT VISIT MOD MDM: CPT | Performed by: INTERNAL MEDICINE

## 2024-03-04 PROCEDURE — 85610 PROTHROMBIN TIME: CPT | Performed by: INTERNAL MEDICINE

## 2024-03-04 PROCEDURE — 85025 COMPLETE CBC W/AUTO DIFF WBC: CPT | Performed by: INTERNAL MEDICINE

## 2024-03-04 PROCEDURE — 87040 BLOOD CULTURE FOR BACTERIA: CPT | Performed by: INTERNAL MEDICINE

## 2024-03-04 PROCEDURE — 36415 COLL VENOUS BLD VENIPUNCTURE: CPT | Performed by: INTERNAL MEDICINE

## 2024-03-04 RX ORDER — LOPERAMIDE HYDROCHLORIDE 2 MG/1
4 CAPSULE ORAL ONCE
Status: COMPLETED | OUTPATIENT
Start: 2024-03-04 | End: 2024-03-04

## 2024-03-04 RX ADMIN — LOPERAMIDE HYDROCHLORIDE 4 MG: 2 CAPSULE ORAL at 21:45

## 2024-03-04 NOTE — ED NOTES
Patient cleaned from diarrhea. Patient switched to hospital bed for comfort.     Kisha Blancas RN  03/04/24 6530

## 2024-03-04 NOTE — ED PROVIDER NOTES
History  Chief Complaint   Patient presents with    Cellulitis    Chills    Fever     73-year-old morbidly obese female brought in by EMS because her PCP told her that her pannus cellulitis grew out a positive culture and and she need to be started on cefepime and vancomycin.  She states she had a low-grade fever, nausea vomiting and diarrhea for the past 2 days.  She also has a history of C. difficile, also A-fib, which she is on Coumadin for chronically.  Patient also has congestive heart failure dilated cardiomyopathy and chronic kidney disease.  Patient is completely nonambulatory.  Patient is confined to bed she has home nursing 12 hours a day.        Prior to Admission Medications   Prescriptions Last Dose Informant Patient Reported? Taking?   DULoxetine (CYMBALTA) 20 mg capsule  Self No No   Sig: Take 1 capsule (20 mg total) by mouth daily   acetaminophen (TYLENOL) 650 mg CR tablet  Self Yes No   Sig: Take 650 mg by mouth every 8 (eight) hours as needed (for osteoarthritis)   allopurinol (ZYLOPRIM) 100 mg tablet  Self No No   Sig: Take 1 tablet (100 mg total) by mouth daily   clindamycin (CLEOCIN) 300 MG capsule   Yes No   Sig: Take 300 mg by mouth 4 (four) times a day. for 2 days  Indications: .   docusate sodium (COLACE) 100 mg capsule   No No   Sig: Take 1 capsule (100 mg total) by mouth daily Do not start before August 29, 2023.   Patient not taking: Reported on 1/16/2024   furosemide (LASIX) 40 mg tablet   No No   Sig: Take 1.5 tablets (60 mg total) by mouth daily   gabapentin (NEURONTIN) 100 mg capsule   No No   Sig: Take 1 capsule (100 mg total) by mouth 3 (three) times a day   levothyroxine 125 mcg tablet  Self Yes No   Sig: Take 125 mcg by mouth daily   midodrine (PROAMATINE) 2.5 mg tablet   No No   Sig: Take 1 tablet (2.5 mg total) by mouth 3 (three) times a day before meals   Patient taking differently: Take 2.5 mg by mouth 3 (three) times a day as needed   nystatin (MYCOSTATIN) powder   No No    Sig: Apply topically 2 (two) times a day   pantoprazole (PROTONIX) 40 mg tablet   No No   Sig: Take 1 tablet (40 mg total) by mouth daily in the early morning   triamcinolone (KENALOG) 0.1 % cream  Self Yes No   Sig: Apply 0.1 application topically 2 (two) times a day   warfarin (COUMADIN) 2 mg tablet   No No   Sig: Take 3 mg by mouth daily. Please hane INR checked in 1 week.      Facility-Administered Medications: None       Past Medical History:   Diagnosis Date    Atrial fibrillation (HCC)     Bladder stone     C. difficile colitis     Cellulitis     CHF (congestive heart failure) (HCC)     Depression with anxiety     Disease of thyroid gland     Diverticulitis     Enterocolitis     History of abnormal cervical Pap smear     Kidney stone     Lymphedema     Menopause     Age 50    Morbid obesity with BMI of 70 and over, adult (HCC)     MRSA (methicillin resistant Staphylococcus aureus)     abdominal wound    Osteoarthritis     Phlebitis     left lower leg    Spondylolysis        Past Surgical History:   Procedure Laterality Date    APPENDECTOMY      CARPAL TUNNEL RELEASE      CHOLECYSTECTOMY      CYSTOSCOPY      stent    FOOT SURGERY  1982    bone spur    KNEE SURGERY      WISDOM TOOTH EXTRACTION         Family History   Problem Relation Age of Onset    Heart failure Mother     Heart disease Mother     Lymphoma Mother     Seizures Mother     Kidney disease Father     Heart disease Father     Heart failure Father     Diabetes type II Father     Diabetes type II Sister     Diabetes type II Brother     Uterine cancer Paternal Aunt     Breast cancer Neg Hx     Colon cancer Neg Hx     Ovarian cancer Neg Hx      I have reviewed and agree with the history as documented.    E-Cigarette/Vaping    E-Cigarette Use Never User      E-Cigarette/Vaping Substances    Nicotine No     THC No     CBD No     Flavoring No     Other No     Unknown No      Social History     Tobacco Use    Smoking status: Former     Current packs/day:  0.00     Average packs/day: 5.0 packs/day for 3.0 years (15.0 ttl pk-yrs)     Types: Cigarettes     Start date: 1967     Quit date: 1970     Years since quittin.7    Smokeless tobacco: Never    Tobacco comments:     5 packs/day until  (age 17-19) - As per [employer]    Vaping Use    Vaping status: Never Used   Substance Use Topics    Alcohol use: Not Currently    Drug use: Not Currently     Types: Marijuana     Comment: As a teen - As per [employer]        Review of Systems   Constitutional:  Positive for fever.   Respiratory: Negative.     Cardiovascular: Negative.    Gastrointestinal: Negative.    Genitourinary: Negative.    Musculoskeletal: Negative.    Skin:  Positive for color change, rash and wound.   Neurological: Negative.    Hematological: Negative.    Psychiatric/Behavioral: Negative.         Physical Exam  Physical Exam  Vitals and nursing note reviewed.   Constitutional:       General: She is not in acute distress.     Appearance: She is obese. She is not ill-appearing.   HENT:      Head: Normocephalic and atraumatic.   Eyes:      Extraocular Movements: Extraocular movements intact.      Conjunctiva/sclera: Conjunctivae normal.   Cardiovascular:      Rate and Rhythm: Normal rate. Rhythm irregular.      Pulses: Normal pulses.   Pulmonary:      Effort: Pulmonary effort is normal.      Breath sounds: Normal breath sounds.   Abdominal:      General: There is no distension.      Tenderness: There is abdominal tenderness. There is no right CVA tenderness or left CVA tenderness.      Comments: Patient with morbidly obese abdomen she does have an ulceration on her right lateral pannus   Musculoskeletal:      Cervical back: Normal range of motion and neck supple.      Right lower leg: No edema.      Left lower leg: No edema.   Skin:     General: Skin is warm and dry.   Neurological:      General: No focal deficit present.      Mental Status: She is oriented to person, place, and time.    Psychiatric:         Mood and Affect: Mood normal.         Behavior: Behavior normal.         Thought Content: Thought content normal.         Judgment: Judgment normal.         Vital Signs  ED Triage Vitals [03/04/24 1453]   Temp Pulse Respirations Blood Pressure SpO2   -- 68 18 140/65 95 %      Temp src Heart Rate Source Patient Position - Orthostatic VS BP Location FiO2 (%)   -- Monitor Sitting Left arm --      Pain Score       No Pain           Vitals:    03/04/24 1453   BP: 140/65   Pulse: 68   Patient Position - Orthostatic VS: Sitting         Visual Acuity      ED Medications  Medications - No data to display    Diagnostic Studies  Results Reviewed       None                   No orders to display              Procedures  Procedures         ED Course                                             Medical Decision Making  Amount and/or Complexity of Data Reviewed  Labs: ordered.             Disposition  Final diagnoses:   None     ED Disposition       None          Follow-up Information    None         Patient's Medications   Discharge Prescriptions    No medications on file       No discharge procedures on file.    PDMP Review         Value Time User    PDMP Reviewed  Yes 5/27/2022 11:10 PM Anjelica Dey PA-C            ED Provider  Electronically Signed by             Anup Morrissey MD  03/05/24 0001

## 2024-03-04 NOTE — ED NOTES
Per patient patients care giver will be at patients house at 8am tomorrow. Supervisor vasu to arrange transport for 8am     Kisha Blancas RN  03/04/24 [id number]

## 2024-03-05 VITALS
BODY MASS INDEX: 50.02 KG/M2 | HEIGHT: 64 IN | RESPIRATION RATE: 18 BRPM | DIASTOLIC BLOOD PRESSURE: 55 MMHG | HEART RATE: 65 BPM | SYSTOLIC BLOOD PRESSURE: 112 MMHG | TEMPERATURE: 97.5 F | WEIGHT: 293 LBS | OXYGEN SATURATION: 94 %

## 2024-03-05 PROCEDURE — 96365 THER/PROPH/DIAG IV INF INIT: CPT

## 2024-03-05 RX ORDER — CEFPODOXIME PROXETIL 200 MG/1
200 TABLET, FILM COATED ORAL 2 TIMES DAILY
Qty: 14 TABLET | Refills: 0 | Status: SHIPPED | OUTPATIENT
Start: 2024-03-05 | End: 2024-03-12

## 2024-03-05 RX ORDER — CEFEPIME HYDROCHLORIDE 2 G/50ML
2000 INJECTION, SOLUTION INTRAVENOUS ONCE
Status: COMPLETED | OUTPATIENT
Start: 2024-03-05 | End: 2024-03-05

## 2024-03-05 RX ADMIN — CEFEPIME HYDROCHLORIDE 2000 MG: 2 INJECTION, SOLUTION INTRAVENOUS at 01:05

## 2024-03-05 NOTE — ED NOTES
Patient incontinent of diarrhea. Patient cleaned/changed with 3 staff members and new purwik placed      Kisha Blancas RN  03/04/24 [id number]

## 2024-03-05 NOTE — ED NOTES
Provider made aware of patients incontinence of diarrhea.      Kisha Blancas, VALARIE  03/04/24 4986

## 2024-03-05 NOTE — ED NOTES
Patient incontinent of stool. Patient cleaned and changed with 3 staff members       Kisha Blancas RN  03/04/24 8640

## 2024-03-06 ENCOUNTER — HOME CARE VISIT (OUTPATIENT)
Dept: HOME HEALTH SERVICES | Facility: HOME HEALTHCARE | Age: 74
End: 2024-03-06
Payer: MEDICARE

## 2024-03-06 VITALS
HEART RATE: 68 BPM | RESPIRATION RATE: 18 BRPM | SYSTOLIC BLOOD PRESSURE: 102 MMHG | TEMPERATURE: 97.5 F | DIASTOLIC BLOOD PRESSURE: 56 MMHG | OXYGEN SATURATION: 98 %

## 2024-03-06 PROCEDURE — G0299 HHS/HOSPICE OF RN EA 15 MIN: HCPCS

## 2024-03-07 ENCOUNTER — HOME CARE VISIT (OUTPATIENT)
Dept: HOME HEALTH SERVICES | Facility: HOME HEALTHCARE | Age: 74
End: 2024-03-07
Payer: MEDICARE

## 2024-03-07 VITALS — SYSTOLIC BLOOD PRESSURE: 114 MMHG | DIASTOLIC BLOOD PRESSURE: 78 MMHG | OXYGEN SATURATION: 99 % | HEART RATE: 73 BPM

## 2024-03-07 PROCEDURE — G0151 HHCP-SERV OF PT,EA 15 MIN: HCPCS

## 2024-03-07 NOTE — CASE COMMUNICATION
Patient is discharged from [facility] PT at this time.  HEP established and patient did see slight increase in ROM and LE strength.  Patient failed to progress to seated on side of bed or transition out of bed. She was initially limited by illness but then bed broke and she declined as unsafe.  She is working to get bed repaired/replaced.  Until new bed obtained, further therapy not warranted.

## 2024-03-09 LAB
BACTERIA BLD CULT: NORMAL
BACTERIA BLD CULT: NORMAL

## 2024-03-12 ENCOUNTER — HOME CARE VISIT (OUTPATIENT)
Dept: HOME HEALTH SERVICES | Facility: HOME HEALTHCARE | Age: 74
End: 2024-03-12
Payer: MEDICARE

## 2024-03-12 VITALS
HEART RATE: 54 BPM | RESPIRATION RATE: 20 BRPM | SYSTOLIC BLOOD PRESSURE: 118 MMHG | OXYGEN SATURATION: 98 % | TEMPERATURE: 97.9 F | DIASTOLIC BLOOD PRESSURE: 66 MMHG

## 2024-03-12 PROCEDURE — G0299 HHS/HOSPICE OF RN EA 15 MIN: HCPCS

## 2024-03-17 NOTE — ASSESSMENT & PLAN NOTE
Continued Stay SW/CM Assessment/Plan of Care Note     Progress note:  CM reviewed record, sent more referrals for home oxygen. [facility] accepted patient and sent required documents via [facility]. Anticipate patient may DC home today. CM will continue to follow for DCP.     Update @ 1215 attempted to call [facility], got voicmail and left message, sent messages via [facility] pending reply, waiting for oxygen delivery for discharge, patient and wife informed at bedside. Will continue to follow for DCP.    Update @ 1340 called [facility] again and was able to reach staff, she was sending message that order was uploaded. Received message in [employer] that they did not receive upload, faxed manually and uploaded again, replied in [employer] to inform them.    Update @ 1420 called [facility] [phone] and spoke to Marylin, she was forwarding CM information for a call back for update.    Update @ 1515 called [facility] and spoke to Samira, local branches closed on weekends, per Samira, need to give them more time to reply, informed them that CM will be going home soon but after hours CM available at different number, per Samira they will call the patient if they can't reach CM.     Discharge Plan/Needs:     Continued Care and Services - Admitted Since 3/12/2024       Durable Medical Equipment Coordination complete.      Service Provider Request Status Selected Services Address Phone Fax    [facility]  Selected Durable Medical Equipment [address] [id number] -- --              Dialysis Infusion Coordination complete.      Service Provider Request Status Selected Services Address Phone Fax    [facility]  Selected Disease Management [address] [address] [phone]                  Therapy Recommendations for Discharge:   PT:      Last Filed Values         Value Time User    PT Discharge Needs  outpatient therapy 1-3 times per week 3/16/2024   81-year-old female with morbid obesity and diastolic CHF recently hospitalized at [facility] who re-presented for recurrent cellulitis of pannus in setting of morbid obesity, chronic lymphedema, increased moisture and superficial skin breakdown  · Wound culture with pseudomonas and proteus  · Continue cefepime 2gm q8 until 6/9  · PO vanc for c diff until 06/12 for prophylaxis  · Surgery evaluated, no surgical intervention or abscess noted  · Continue aggressive skin care with minimizing friction and moisture related skin breakdown   Frequent offloading and repositioning  · Recommend that patient be evaluated outpatient by plastic surgery outpatient for chronic lymphedema, and bariatrics 2:14 PM Myriam Lim, PT          Mobility Equipment Recommended for Discharge: vended 2ww 3/16

## 2024-03-19 ENCOUNTER — HOME CARE VISIT (OUTPATIENT)
Dept: HOME HEALTH SERVICES | Facility: HOME HEALTHCARE | Age: 74
End: 2024-03-19
Payer: MEDICARE

## 2024-03-19 PROCEDURE — G0299 HHS/HOSPICE OF RN EA 15 MIN: HCPCS

## 2024-03-26 ENCOUNTER — HOME CARE VISIT (OUTPATIENT)
Dept: HOME HEALTH SERVICES | Facility: HOME HEALTHCARE | Age: 74
End: 2024-03-26
Payer: MEDICARE

## 2024-03-26 VITALS
HEART RATE: 72 BPM | SYSTOLIC BLOOD PRESSURE: 118 MMHG | RESPIRATION RATE: 18 BRPM | TEMPERATURE: 97.3 F | DIASTOLIC BLOOD PRESSURE: 78 MMHG | OXYGEN SATURATION: 99 %

## 2024-03-26 PROCEDURE — 10330064 PAD, ABD 5X9" STR LF (1/PK 20PK/BX) MGM1

## 2024-03-26 PROCEDURE — 10330064 SPONGE, GAUZE 8PLY N/S 4"X4" (200/PK 20P

## 2024-03-26 PROCEDURE — 400014 VN F/U

## 2024-03-26 PROCEDURE — G0299 HHS/HOSPICE OF RN EA 15 MIN: HCPCS

## 2024-04-03 ENCOUNTER — HOME CARE VISIT (OUTPATIENT)
Dept: HOME HEALTH SERVICES | Facility: HOME HEALTHCARE | Age: 74
End: 2024-04-03
Payer: MEDICARE

## 2024-04-03 VITALS
DIASTOLIC BLOOD PRESSURE: 56 MMHG | OXYGEN SATURATION: 99 % | HEART RATE: 54 BPM | RESPIRATION RATE: 20 BRPM | SYSTOLIC BLOOD PRESSURE: 112 MMHG | TEMPERATURE: 98.3 F

## 2024-04-03 PROCEDURE — G0299 HHS/HOSPICE OF RN EA 15 MIN: HCPCS

## 2024-04-09 ENCOUNTER — HOME CARE VISIT (OUTPATIENT)
Dept: HOME HEALTH SERVICES | Facility: HOME HEALTHCARE | Age: 74
End: 2024-04-09
Payer: MEDICARE

## 2024-04-09 VITALS
HEART RATE: 67 BPM | DIASTOLIC BLOOD PRESSURE: 76 MMHG | TEMPERATURE: 96.9 F | RESPIRATION RATE: 18 BRPM | SYSTOLIC BLOOD PRESSURE: 124 MMHG | OXYGEN SATURATION: 97 %

## 2024-04-09 PROCEDURE — G0299 HHS/HOSPICE OF RN EA 15 MIN: HCPCS

## 2024-04-16 ENCOUNTER — HOME CARE VISIT (OUTPATIENT)
Dept: HOME HEALTH SERVICES | Facility: HOME HEALTHCARE | Age: 74
End: 2024-04-16
Payer: MEDICARE

## 2024-04-16 VITALS
TEMPERATURE: 97.4 F | DIASTOLIC BLOOD PRESSURE: 62 MMHG | SYSTOLIC BLOOD PRESSURE: 104 MMHG | OXYGEN SATURATION: 99 % | RESPIRATION RATE: 20 BRPM | HEART RATE: 76 BPM

## 2024-04-16 PROCEDURE — G0299 HHS/HOSPICE OF RN EA 15 MIN: HCPCS

## 2024-05-13 ENCOUNTER — ANTICOAG VISIT (OUTPATIENT)
Dept: CARDIOLOGY CLINIC | Facility: CLINIC | Age: 74
End: 2024-05-13

## 2024-05-13 DIAGNOSIS — I48.20 CHRONIC ATRIAL FIBRILLATION (HCC): Primary | Chronic | ICD-10-CM

## 2024-05-13 DIAGNOSIS — Z79.01 LONG TERM (CURRENT) USE OF ANTICOAGULANTS: ICD-10-CM

## 2024-05-16 LAB
APTT PPP: 35.5 SEC (ref 21.6–35.6)
INR PPP: 1.4
PROTHROMBIN TIME: 17.4 SEC (ref 12–14.6)

## 2024-05-17 ENCOUNTER — ANTICOAG VISIT (OUTPATIENT)
Dept: CARDIOLOGY CLINIC | Facility: CLINIC | Age: 74
End: 2024-05-17
Payer: MEDICARE

## 2024-05-17 DIAGNOSIS — I48.20 CHRONIC ATRIAL FIBRILLATION (HCC): Primary | Chronic | ICD-10-CM

## 2024-05-17 DIAGNOSIS — Z79.01 LONG TERM (CURRENT) USE OF ANTICOAGULANTS: ICD-10-CM

## 2024-05-17 PROCEDURE — 93793 ANTICOAG MGMT PT WARFARIN: CPT | Performed by: PHYSICIAN ASSISTANT

## 2024-05-17 NOTE — PATIENT INSTRUCTIONS
Spoke with Patient: No changes in medication health or diet. No missed or extra doses. No s/s of bleeding TIA or CVA. No new ABX, NSAIDs OTC meds, or supplements. Dose as instructed and recheck in one week.   Jessica Mata PA-C

## 2024-05-21 ENCOUNTER — HOSPITAL ENCOUNTER (OUTPATIENT)
Dept: ULTRASOUND IMAGING | Facility: HOSPITAL | Age: 74
Discharge: HOME | End: 2024-05-21
Attending: INTERNAL MEDICINE

## 2024-05-23 ENCOUNTER — HOSPITAL ENCOUNTER (INPATIENT)
Facility: HOSPITAL | Age: 74
LOS: 5 days | Discharge: HOME | DRG: 291 | End: 2024-05-28
Attending: EMERGENCY MEDICINE | Admitting: INTERNAL MEDICINE
Payer: MEDICARE

## 2024-05-23 ENCOUNTER — APPOINTMENT (EMERGENCY)
Dept: RADIOLOGY | Facility: HOSPITAL | Age: 74
DRG: 291 | End: 2024-05-23
Payer: MEDICARE

## 2024-05-23 DIAGNOSIS — E66.01 CLASS 3 SEVERE OBESITY DUE TO EXCESS CALORIES WITH SERIOUS COMORBIDITY AND BODY MASS INDEX (BMI) GREATER THAN OR EQUAL TO 70 IN ADULT (HCC): Chronic | ICD-10-CM

## 2024-05-23 DIAGNOSIS — I50.32 CHRONIC DIASTOLIC CONGESTIVE HEART FAILURE (HCC): Chronic | ICD-10-CM

## 2024-05-23 DIAGNOSIS — I48.11 LONGSTANDING PERSISTENT ATRIAL FIBRILLATION (HCC): ICD-10-CM

## 2024-05-23 DIAGNOSIS — T07.XXXA MULTIPLE OPEN WOUNDS: ICD-10-CM

## 2024-05-23 DIAGNOSIS — R06.00 DYSPNEA: Primary | ICD-10-CM

## 2024-05-23 DIAGNOSIS — R26.2 AMBULATORY DYSFUNCTION: Chronic | ICD-10-CM

## 2024-05-23 DIAGNOSIS — R09.02 HYPOXIA: ICD-10-CM

## 2024-05-23 PROBLEM — I50.33 ACUTE ON CHRONIC DIASTOLIC CONGESTIVE HEART FAILURE (HCC): Status: ACTIVE | Noted: 2020-10-03

## 2024-05-23 PROBLEM — J96.01 ACUTE RESPIRATORY FAILURE WITH HYPOXIA (HCC): Status: ACTIVE | Noted: 2024-05-23

## 2024-05-23 LAB
2HR DELTA HS TROPONIN: -1 NG/L
ALBUMIN SERPL BCP-MCNC: 3.1 G/DL (ref 3.5–5)
ALP SERPL-CCNC: 94 U/L (ref 34–104)
ALT SERPL W P-5'-P-CCNC: 11 U/L (ref 7–52)
ANION GAP SERPL CALCULATED.3IONS-SCNC: 5 MMOL/L (ref 4–13)
AST SERPL W P-5'-P-CCNC: 13 U/L (ref 13–39)
ATRIAL RATE: 51 BPM
ATRIAL RATE: 56 BPM
BASOPHILS # BLD AUTO: 0.06 THOUSANDS/ÂΜL (ref 0–0.1)
BASOPHILS NFR BLD AUTO: 1 % (ref 0–1)
BILIRUB SERPL-MCNC: 0.64 MG/DL (ref 0.2–1)
BNP SERPL-MCNC: 149 PG/ML (ref 0–100)
BUN SERPL-MCNC: 34 MG/DL (ref 5–25)
CALCIUM ALBUM COR SERPL-MCNC: 9.5 MG/DL (ref 8.3–10.1)
CALCIUM SERPL-MCNC: 8.8 MG/DL (ref 8.4–10.2)
CARDIAC TROPONIN I PNL SERPL HS: 7 NG/L
CARDIAC TROPONIN I PNL SERPL HS: 8 NG/L
CHLORIDE SERPL-SCNC: 102 MMOL/L (ref 96–108)
CO2 SERPL-SCNC: 33 MMOL/L (ref 21–32)
CREAT SERPL-MCNC: 1.21 MG/DL (ref 0.6–1.3)
D DIMER PPP FEU-MCNC: <0.27 UG/ML FEU
EOSINOPHIL # BLD AUTO: 0.24 THOUSAND/ÂΜL (ref 0–0.61)
EOSINOPHIL NFR BLD AUTO: 2 % (ref 0–6)
ERYTHROCYTE [DISTWIDTH] IN BLOOD BY AUTOMATED COUNT: 16.6 % (ref 11.6–15.1)
GFR SERPL CREATININE-BSD FRML MDRD: 44 ML/MIN/1.73SQ M
GLUCOSE SERPL-MCNC: 119 MG/DL (ref 65–140)
HCT VFR BLD AUTO: 39.3 % (ref 34.8–46.1)
HGB BLD-MCNC: 11.4 G/DL (ref 11.5–15.4)
IMM GRANULOCYTES # BLD AUTO: 0.03 THOUSAND/UL (ref 0–0.2)
IMM GRANULOCYTES NFR BLD AUTO: 0 % (ref 0–2)
INR PPP: 1.44 (ref 0.84–1.19)
LYMPHOCYTES # BLD AUTO: 1.42 THOUSANDS/ÂΜL (ref 0.6–4.47)
LYMPHOCYTES NFR BLD AUTO: 14 % (ref 14–44)
MCH RBC QN AUTO: 25.3 PG (ref 26.8–34.3)
MCHC RBC AUTO-ENTMCNC: 29 G/DL (ref 31.4–37.4)
MCV RBC AUTO: 87 FL (ref 82–98)
MONOCYTES # BLD AUTO: 0.47 THOUSAND/ÂΜL (ref 0.17–1.22)
MONOCYTES NFR BLD AUTO: 5 % (ref 4–12)
NEUTROPHILS # BLD AUTO: 8 THOUSANDS/ÂΜL (ref 1.85–7.62)
NEUTS SEG NFR BLD AUTO: 78 % (ref 43–75)
NRBC BLD AUTO-RTO: 0 /100 WBCS
PLATELET # BLD AUTO: 405 THOUSANDS/UL (ref 149–390)
PMV BLD AUTO: 8.5 FL (ref 8.9–12.7)
POTASSIUM SERPL-SCNC: 4.2 MMOL/L (ref 3.5–5.3)
PROT SERPL-MCNC: 7.8 G/DL (ref 6.4–8.4)
PROTHROMBIN TIME: 17.6 SECONDS (ref 11.6–14.5)
QRS AXIS: -2 DEGREES
QRS AXIS: -22 DEGREES
QRSD INTERVAL: 64 MS
QRSD INTERVAL: 78 MS
QT INTERVAL: 406 MS
QT INTERVAL: 494 MS
QTC INTERVAL: 415 MS
QTC INTERVAL: 489 MS
RBC # BLD AUTO: 4.5 MILLION/UL (ref 3.81–5.12)
SODIUM SERPL-SCNC: 140 MMOL/L (ref 135–147)
T WAVE AXIS: 160 DEGREES
T WAVE AXIS: 73 DEGREES
VENTRICULAR RATE: 59 BPM
VENTRICULAR RATE: 63 BPM
WBC # BLD AUTO: 10.22 THOUSAND/UL (ref 4.31–10.16)

## 2024-05-23 PROCEDURE — 83880 ASSAY OF NATRIURETIC PEPTIDE: CPT | Performed by: EMERGENCY MEDICINE

## 2024-05-23 PROCEDURE — 96374 THER/PROPH/DIAG INJ IV PUSH: CPT

## 2024-05-23 PROCEDURE — 99285 EMERGENCY DEPT VISIT HI MDM: CPT

## 2024-05-23 PROCEDURE — 85610 PROTHROMBIN TIME: CPT | Performed by: EMERGENCY MEDICINE

## 2024-05-23 PROCEDURE — 94664 DEMO&/EVAL PT USE INHALER: CPT

## 2024-05-23 PROCEDURE — 84484 ASSAY OF TROPONIN QUANT: CPT | Performed by: EMERGENCY MEDICINE

## 2024-05-23 PROCEDURE — 85379 FIBRIN DEGRADATION QUANT: CPT | Performed by: EMERGENCY MEDICINE

## 2024-05-23 PROCEDURE — 36415 COLL VENOUS BLD VENIPUNCTURE: CPT | Performed by: EMERGENCY MEDICINE

## 2024-05-23 PROCEDURE — 99223 1ST HOSP IP/OBS HIGH 75: CPT | Performed by: INTERNAL MEDICINE

## 2024-05-23 PROCEDURE — 85025 COMPLETE CBC W/AUTO DIFF WBC: CPT | Performed by: EMERGENCY MEDICINE

## 2024-05-23 PROCEDURE — 71045 X-RAY EXAM CHEST 1 VIEW: CPT

## 2024-05-23 PROCEDURE — 94760 N-INVAS EAR/PLS OXIMETRY 1: CPT

## 2024-05-23 PROCEDURE — 93005 ELECTROCARDIOGRAM TRACING: CPT

## 2024-05-23 PROCEDURE — 80053 COMPREHEN METABOLIC PANEL: CPT | Performed by: EMERGENCY MEDICINE

## 2024-05-23 PROCEDURE — 93010 ELECTROCARDIOGRAM REPORT: CPT | Performed by: INTERNAL MEDICINE

## 2024-05-23 RX ORDER — WARFARIN SODIUM 2 MG/1
4 TABLET ORAL
Status: DISCONTINUED | OUTPATIENT
Start: 2024-05-23 | End: 2024-05-25

## 2024-05-23 RX ORDER — ALLOPURINOL 100 MG/1
100 TABLET ORAL DAILY
Status: DISCONTINUED | OUTPATIENT
Start: 2024-05-23 | End: 2024-05-28 | Stop reason: HOSPADM

## 2024-05-23 RX ORDER — TRIAMCINOLONE ACETONIDE 5 MG/G
1 CREAM TOPICAL AS NEEDED
COMMUNITY

## 2024-05-23 RX ORDER — MIDODRINE HYDROCHLORIDE 5 MG/1
2.5 TABLET ORAL
Status: DISCONTINUED | OUTPATIENT
Start: 2024-05-23 | End: 2024-05-28 | Stop reason: HOSPADM

## 2024-05-23 RX ORDER — GABAPENTIN 100 MG/1
100 CAPSULE ORAL 3 TIMES DAILY
Status: DISCONTINUED | OUTPATIENT
Start: 2024-05-23 | End: 2024-05-28 | Stop reason: HOSPADM

## 2024-05-23 RX ORDER — NYSTATIN 100000 [USP'U]/G
POWDER TOPICAL 2 TIMES DAILY
Status: DISCONTINUED | OUTPATIENT
Start: 2024-05-23 | End: 2024-05-26

## 2024-05-23 RX ORDER — FUROSEMIDE 10 MG/ML
40 INJECTION INTRAMUSCULAR; INTRAVENOUS 2 TIMES DAILY
Status: DISCONTINUED | OUTPATIENT
Start: 2024-05-23 | End: 2024-05-23

## 2024-05-23 RX ORDER — PANTOPRAZOLE SODIUM 40 MG/1
40 TABLET, DELAYED RELEASE ORAL
Status: DISCONTINUED | OUTPATIENT
Start: 2024-05-23 | End: 2024-05-28 | Stop reason: HOSPADM

## 2024-05-23 RX ORDER — ONDANSETRON 2 MG/ML
4 INJECTION INTRAMUSCULAR; INTRAVENOUS EVERY 6 HOURS PRN
Status: DISCONTINUED | OUTPATIENT
Start: 2024-05-23 | End: 2024-05-28 | Stop reason: HOSPADM

## 2024-05-23 RX ORDER — IRON POLYSACCHARIDE COMPLEX 150 MG
150 CAPSULE ORAL DAILY
Status: DISCONTINUED | OUTPATIENT
Start: 2024-05-23 | End: 2024-05-28 | Stop reason: HOSPADM

## 2024-05-23 RX ORDER — DULOXETIN HYDROCHLORIDE 20 MG/1
20 CAPSULE, DELAYED RELEASE ORAL DAILY
Status: DISCONTINUED | OUTPATIENT
Start: 2024-05-23 | End: 2024-05-28 | Stop reason: HOSPADM

## 2024-05-23 RX ORDER — ACETAMINOPHEN 325 MG/1
650 TABLET ORAL EVERY 6 HOURS PRN
Status: DISCONTINUED | OUTPATIENT
Start: 2024-05-23 | End: 2024-05-28 | Stop reason: HOSPADM

## 2024-05-23 RX ORDER — SODIUM CHLORIDE 9 MG/ML
3 INJECTION INTRAVENOUS
Status: DISCONTINUED | OUTPATIENT
Start: 2024-05-23 | End: 2024-05-28 | Stop reason: HOSPADM

## 2024-05-23 RX ORDER — ONDANSETRON 2 MG/ML
4 INJECTION INTRAMUSCULAR; INTRAVENOUS ONCE
Status: COMPLETED | OUTPATIENT
Start: 2024-05-23 | End: 2024-05-23

## 2024-05-23 RX ADMIN — ALLOPURINOL 100 MG: 100 TABLET ORAL at 11:30

## 2024-05-23 RX ADMIN — GABAPENTIN 100 MG: 100 CAPSULE ORAL at 16:58

## 2024-05-23 RX ADMIN — ONDANSETRON 4 MG: 2 INJECTION INTRAMUSCULAR; INTRAVENOUS at 06:30

## 2024-05-23 RX ADMIN — DULOXETINE HYDROCHLORIDE 20 MG: 20 CAPSULE, DELAYED RELEASE ORAL at 11:30

## 2024-05-23 RX ADMIN — WARFARIN SODIUM 4 MG: 2 TABLET ORAL at 17:00

## 2024-05-23 RX ADMIN — PANTOPRAZOLE SODIUM 40 MG: 40 TABLET, DELAYED RELEASE ORAL at 11:30

## 2024-05-23 RX ADMIN — MIDODRINE HYDROCHLORIDE 2.5 MG: 5 TABLET ORAL at 16:58

## 2024-05-23 RX ADMIN — GABAPENTIN 100 MG: 100 CAPSULE ORAL at 11:30

## 2024-05-23 RX ADMIN — MIDODRINE HYDROCHLORIDE 2.5 MG: 5 TABLET ORAL at 11:31

## 2024-05-23 RX ADMIN — LEVOTHYROXINE SODIUM 137 MCG: 25 TABLET ORAL at 11:30

## 2024-05-23 RX ADMIN — FUROSEMIDE 40 MG: 10 INJECTION, SOLUTION INTRAMUSCULAR; INTRAVENOUS at 11:30

## 2024-05-23 NOTE — ASSESSMENT & PLAN NOTE
Likely multifactorial secondary to fluid overload, obesity hypoventilation, undiagnosed sleep apnea  Continue oxygen, titrate as tolerated  Treatment for suspected CHF exacerbation as above  Respiratory protocol  Follow-up official chest x-ray reading

## 2024-05-23 NOTE — SPEECH THERAPY NOTE
Speech Language/Pathology  Consult received.  Records reviewed.  Pt admitted for acute on chronic diastolic congestive heart failure (HCC). Pt passed RN Dysphagia Assessment. Pt reported working with [facility] speech therapy. Pt reported increased difficulties swallowing with breads specifically bagels stated they feel stuck in chest. Pt did state typically does not drink while she eats but is starting to now. ST reviewed safe swallow strategies including slow rate, alternate liquids with solids, and swallow prior to additional po. Recommended pt avoid dry dense foods, add condiments to moisten, and cut into bite sized pieces.  Recommend follow up with GI.No additional inpatient Speech Pathology evaluation appears indicated at this time.  Please re-consult if additional concerns arise. Thank you.

## 2024-05-23 NOTE — ASSESSMENT & PLAN NOTE
Wt Readings from Last 3 Encounters:   05/23/24 (!) 194 kg (428 lb 2.1 oz)   03/05/24 (!) 186 kg (411 lb)   01/19/24 (!) 187 kg (411 lb 13.1 oz)     Patient with worsening dyspnea and orthopnea.  BNP slightly elevated in the ER, chest x-ray with mild vascular congestion per my review  Echo from January 2024 with EF of 55%  Patient states that she has been taking her oral Lasix at home and monitoring her fluid intake  Will initiate IV Lasix 40 mg twice daily  Monitor ANA M's Daily weight  If no improvement will consult cardiology  Currently on 2 L nasal cannula, will titrate as tolerated

## 2024-05-23 NOTE — RESPIRATORY THERAPY NOTE
RT Protocol Note  Zee Kirk 73 y.o. female MRN: [id number]  Unit/Bed#: ED 25 Encounter: [id number]    Assessment    Principal Problem:    Acute on chronic diastolic congestive heart failure (HCC)  Active Problems:    Class 3 severe obesity due to excess calories with serious comorbidity and body mass index (BMI) greater than or equal to 70 in adult (HCC)    Chronic atrial fibrillation (HCC)    Acute respiratory failure with hypoxia (HCC)      Home Pulmonary Medications:  None    Home Devices/Therapy: Other (Comment) (None)    Past Medical History:   Diagnosis Date    Atrial fibrillation (HCC)     Bladder stone     C. difficile colitis     Cellulitis     CHF (congestive heart failure) (HCC)     Depression with anxiety     Disease of thyroid gland     Diverticulitis     Enterocolitis     History of abnormal cervical Pap smear     Kidney stone     Lymphedema     Menopause     Age 50    Morbid obesity with BMI of 70 and over, adult ([facility])     MRSA (methicillin resistant Staphylococcus aureus)     abdominal wound    Osteoarthritis     Phlebitis     left lower leg    Spondylolysis      Social History     Socioeconomic History    Marital status: Single     Spouse name: None    Number of children: None    Years of education: None    Highest education level: None   Occupational History    None   Tobacco Use    Smoking status: Former     Current packs/day: 0.00     Average packs/day: 5.0 packs/day for 3.0 years (15.0 ttl pk-yrs)     Types: Cigarettes     Start date: 1967     Quit date: 1970     Years since quittin.9    Smokeless tobacco: Never    Tobacco comments:     5 packs/day until  (age 17-19) - As per [employer]    Vaping Use    Vaping status: Never Used   Substance and Sexual Activity    Alcohol use: Not Currently    Drug use: Not Currently     Types: Marijuana     Comment: As a teen - As per [employer]     Sexual activity: Not Currently   Other Topics Concern    None   Social History  "Narrative    Education history: [employer] school    Exercise habits: Walking daily    Nondrinker / no alcohol use - As per [employer]    Worship     - As per [employer]      Social Determinants of Health     Financial Resource Strain: Not on file   Food Insecurity: No Food Insecurity (1/16/2024)    Hunger Vital Sign     Worried About Running Out of Food in the Last Year: Never true     Ran Out of Food in the Last Year: Never true   Transportation Needs: No Transportation Needs (1/16/2024)    PRAPARE - Transportation     Lack of Transportation (Medical): No     Lack of Transportation (Non-Medical): No   Physical Activity: Not on file   Stress: Not on file   Social Connections: Not on file   Intimate Partner Violence: Not on file   Housing Stability: Low Risk  (1/16/2024)    Housing Stability Vital Sign     Unable to Pay for Housing in the Last Year: No     Number of Places Lived in the Last Year: 1     Unstable Housing in the Last Year: No       Subjective         Objective    Physical Exam:   Assessment Type: Assess only  General Appearance: Alert, Awake  Respiratory Pattern: Normal  Chest Assessment: Chest expansion symmetrical  Bilateral Breath Sounds: Clear    Vitals:  Blood pressure 136/63, pulse 65, temperature 97.5 °F (36.4 °C), temperature source Tympanic, resp. rate 13, height 5' 4\" (1.626 m), weight (!) 194 kg (428 lb 2.1 oz), SpO2 98%.          Imaging and other studies: I have personally reviewed pertinent reports.            Plan    Respiratory Plan: Discontinue Protocol        Resp Comments: Patient states she has no issues with her breathing and takes no breathing medication at home. BS clear. No SOB or distress noted.. Will discontinue protocol at this time.       "

## 2024-05-23 NOTE — H&P
[facility]  H&P  Name: Zee Kirk 73 y.o. female I MRN: [id number]  Unit/Bed#: ED 25 I Date of Admission: 5/23/2024   Date of Service: 5/23/2024 I Hospital Day: 0      Assessment & Plan   * Acute on chronic diastolic congestive heart failure (HCC)  Assessment & Plan  Wt Readings from Last 3 Encounters:   05/23/24 (!) 194 kg (428 lb 2.1 oz)   03/05/24 (!) 186 kg (411 lb)   01/19/24 (!) 187 kg (411 lb 13.1 oz)     Patient with worsening dyspnea and orthopnea.  BNP slightly elevated in the ER, chest x-ray with mild vascular congestion per my review  Echo from January 2024 with EF of 55%  Patient states that she has been taking her oral Lasix at home and monitoring her fluid intake  Will initiate IV Lasix 40 mg twice daily  Monitor ANA M's Daily weight  If no improvement will consult cardiology  Currently on 2 L nasal cannula, will titrate as tolerated    Acute respiratory failure with hypoxia (HCC)  Assessment & Plan  Likely multifactorial secondary to fluid overload, obesity hypoventilation, undiagnosed sleep apnea  Continue oxygen, titrate as tolerated  Treatment for suspected CHF exacerbation as above  Respiratory protocol  Follow-up official chest x-ray reading    Chronic atrial fibrillation (HCC)  Assessment & Plan  Currently rate controlled  Continue to monitor  On Coumadin for anticoagulation    Class 3 severe obesity due to excess calories with serious comorbidity and body mass index (BMI) greater than or equal to 70 in adult (HCC)  Assessment & Plan  Patient BMI 73.  Patient is bedbound and does not ambulate at home  Continue supportive care         VTE Pharmacologic Prophylaxis:   Moderate Risk (Score 3-4) - Pharmacological DVT Prophylaxis Ordered: warfarin (Coumadin).  Code Status: Level 1 - Full Code   Discussion with family:  Updated patient regarding plan of care.     Anticipated Length of Stay: Patient will be admitted on an inpatient basis with an anticipated length of  stay of greater than 2 midnights secondary to CHF exacerbation.    Total Time Spent on Date of Encounter in care of patient: 70 mins. This time was spent on one or more of the following: performing physical exam; counseling and coordination of care; obtaining or reviewing history; documenting in the medical record; reviewing/ordering tests, medications or procedures; communicating with other healthcare professionals and discussing with patient's family/caregivers.    Chief Complaint: Shortness of breath    History of Present Illness:  Zee Kirk is a 73 y.o. female with a PMH of chronic atrial fibrillation, morbid obesity, hypothyroidism, depression/anxiety, chronic diastolic heart failure who presents with breath.  Patient states that over the last few days she has noticed increasing shortness of breath.  Patient states that when she lays flat she feels significantly short of breath and cannot catch her breath.  Patient has been taking her Lasix as prescribed.  Compliant with fluid restriction.  May not be as strict with her salt intake.  In the ER oxygen saturation down to the 70s on room air.  Suspected to have fluid overload.  Patient admitted for further evaluation/treatment.  Patient does not use oxygen at home and is requiring 2 L nasal cannula at this time    Review of Systems:  Review of Systems   Constitutional:  Positive for activity change and fatigue.   Respiratory:  Positive for shortness of breath.    Neurological:  Positive for weakness.   All other systems reviewed and are negative.      Past Medical and Surgical History:   Past Medical History:   Diagnosis Date    Atrial fibrillation (HCC)     Bladder stone     C. difficile colitis     Cellulitis     CHF (congestive heart failure) (HCC)     Depression with anxiety     Disease of thyroid gland     Diverticulitis     Enterocolitis     History of abnormal cervical Pap smear     Kidney stone     Lymphedema     Menopause     Age 50    Morbid  obesity with BMI of 70 and over, adult (HCC)     MRSA (methicillin resistant Staphylococcus aureus)     abdominal wound    Osteoarthritis     Phlebitis     left lower leg    Spondylolysis        Past Surgical History:   Procedure Laterality Date    APPENDECTOMY      CARPAL TUNNEL RELEASE      CHOLECYSTECTOMY      CYSTOSCOPY      stent    FOOT SURGERY  1982    bone spur    KNEE SURGERY      WISDOM TOOTH EXTRACTION         Meds/Allergies:  Prior to Admission medications    Medication Sig Start Date End Date Taking? Authorizing Provider   acetaminophen (TYLENOL) 650 mg CR tablet Take 650 mg by mouth every 8 (eight) hours as needed (for osteoarthritis)    Historical Provider, MD   allopurinol (ZYLOPRIM) 100 mg tablet Take 1 tablet (100 mg total) by mouth daily 9/30/19   Davon Lopez DO   DULoxetine (CYMBALTA) 20 mg capsule Take 1 capsule (20 mg total) by mouth daily 8/15/18   Davon Lopez DO   furosemide (LASIX) 40 mg tablet Take 1.5 tablets (60 mg total) by mouth daily 9/18/23 1/16/24  Kishan Jones PA-C   gabapentin (NEURONTIN) 100 mg capsule Take 1 capsule (100 mg total) by mouth 3 (three) times a day 8/10/23 1/16/24  ORACIO Last   iron polysaccharides (Ferrex 150) 150 mg capsule Take 150 mg by mouth 2 (two) times a day. Indications: Anemia From Inadequate Iron in the Body    Historical Provider, MD   levothyroxine 125 mcg tablet Take 137 mcg by mouth daily    Historical Provider, MD   midodrine (PROAMATINE) 2.5 mg tablet Take 1 tablet (2.5 mg total) by mouth 3 (three) times a day before meals  Patient taking differently: Take 2.5 mg by mouth 3 (three) times a day as needed 8/28/23 1/16/24  ORACIO Gardner   midodrine (PROAMATINE) 2.5 mg tablet Take 2.5 mg by mouth 3 (three) times a day before meals. Indications: Disorder of Low Blood Pressure    Historical Provider, MD   nystatin (MYCOSTATIN) powder Apply topically 2 (two) times a day 9/18/23   Kishan Jones PA-C   pantoprazole (PROTONIX)  40 mg tablet Take 1 tablet (40 mg total) by mouth daily in the early morning 1/20/24 2/19/24  ORACIO Gardner   warfarin (COUMADIN) 2 mg tablet Take 4 mg by mouth daily. Indications: Atrial Fibrillation    Yane Verdugo MD   clindamycin (CLEOCIN) 300 MG capsule Take 300 mg by mouth 4 (four) times a day. for 2 days  Indications: .  5/23/24  Historical Provider, MD   docusate sodium (COLACE) 100 mg capsule Take 1 capsule (100 mg total) by mouth daily Do not start before August 29, 2023.  Patient not taking: Reported on 1/16/2024 8/29/23 5/23/24  ORACIO Gardner   furosemide (LASIX) 40 mg tablet Take 40 mg by mouth daily. Indications: Edema  5/23/24  Historical Provider, MD   gabapentin (NEURONTIN) 100 mg capsule Take 100 mg by mouth 3 (three) times a day. Indications: Neuropathic Pain  5/23/24  Historical Provider, MD   triamcinolone (KENALOG) 0.1 % cream Apply 0.1 application. topically 2 (two) times a day 12/16/21 5/23/24  Historical Provider, MD   warfarin (COUMADIN) 2 mg tablet Take 3 mg by mouth daily. Please hane INR checked in 1 week.  Patient taking differently: Take 4 mg by mouth daily. Indications: Atrial Fibrillation 1/19/24 5/23/24  ORACIO Gardner     I have reviewed home medications with patient personally.    Allergies:   Allergies   Allergen Reactions    Augmentin Es-600 [Amoxicillin-Pot Clavulanate] Anaphylaxis and Rash    Bactrim [Sulfamethoxazole-Trimethoprim] Anaphylaxis    Cefazolin Itching and Other (See Comments)     Patient developed itching and difficulty swallowing following IV cefazolin administration at [facility] 7/19/20 which required treatment with benadryl and epinephrine - has tolerated other cephalosporins with different side chains including cefepime, cefuroxime, and cephalexin    Ciprofloxacin Anaphylaxis    Keflex [Cephalexin] Anaphylaxis    Penicillins Anaphylaxis, Rash and Other (See Comments)     Tolerates cefepime (11/18/21)    Other Rash, Irritability and  "Edema     Patient reports significant reaction to the use of Max Orb in the abdominal folds leading to swelling, excoriation, maceration and weeping of the skin.     Omeprazole GI Intolerance    Pork-Derived Products - Food Allergy Diarrhea    Sulfa Antibiotics Hives    Latex Rash and Edema    Vitamin C [Ascorbate - Food Allergy] Rash       Social History:  Marital Status: Single   Occupation: none  Patient Pre-hospital Living Situation: Home  Patient Pre-hospital Level of Mobility: non-ambulatory/bed bound  Patient Pre-hospital Diet Restrictions: none  Substance Use History:   Social History     Substance and Sexual Activity   Alcohol Use Not Currently     Social History     Tobacco Use   Smoking Status Former    Current packs/day: 0.00    Average packs/day: 5.0 packs/day for 3.0 years (15.0 ttl pk-yrs)    Types: Cigarettes    Start date: 1967    Quit date: 1970    Years since quittin.9   Smokeless Tobacco Never   Tobacco Comments    5 packs/day until  (age 17-19) - As per [employer]      Social History     Substance and Sexual Activity   Drug Use Not Currently    Types: Marijuana    Comment: As a teen - As per [employer]        Family History:  Family History   Problem Relation Age of Onset    Heart failure Mother     Heart disease Mother     Lymphoma Mother     Seizures Mother     Kidney disease Father     Heart disease Father     Heart failure Father     Diabetes type II Father     Diabetes type II Sister     Diabetes type II Brother     Uterine cancer Paternal Aunt     Breast cancer Neg Hx     Colon cancer Neg Hx     Ovarian cancer Neg Hx        Physical Exam:     Vitals:   Blood Pressure: 124/79 (24 1030)  Pulse: 63 (24 1030)  Temperature: 97.5 °F (36.4 °C) (24 0724)  Temp Source: Tympanic (24 0724)  Respirations: 15 (24 1030)  Height: 5' 4\" (162.6 cm) (24 0617)  Weight - Scale: (!) 194 kg (428 lb 2.1 oz) (24 06)  SpO2: 98 % (24 " 1030)    Physical Exam  Constitutional:       General: She is not in acute distress.     Appearance: She is obese.   HENT:      Head: Normocephalic and atraumatic.      Nose: Nose normal.      Mouth/Throat:      Mouth: Mucous membranes are moist.   Eyes:      Extraocular Movements: Extraocular movements intact.      Conjunctiva/sclera: Conjunctivae normal.   Cardiovascular:      Rate and Rhythm: Normal rate and regular rhythm.   Pulmonary:      Effort: Pulmonary effort is normal. No respiratory distress.      Comments: Difficult lung exam due to body habitus  Abdominal:      Palpations: Abdomen is soft.      Tenderness: There is no abdominal tenderness.   Musculoskeletal:         General: Swelling present. Normal range of motion.      Cervical back: Normal range of motion and neck supple.      Comments: Generalized weakness   Skin:     General: Skin is warm and dry.      Comments: Multiple healing wounds throughout abdomen   Neurological:      General: No focal deficit present.      Mental Status: She is alert. Mental status is at baseline.      Cranial Nerves: No cranial nerve deficit.   Psychiatric:         Mood and Affect: Mood normal.         Behavior: Behavior normal.          Additional Data:     Lab Results:  Results from last 7 days   Lab Units 05/23/24  0627   WBC Thousand/uL 10.22*   HEMOGLOBIN g/dL 11.4*   HEMATOCRIT % 39.3   PLATELETS Thousands/uL 405*   SEGS PCT % 78*   LYMPHO PCT % 14   MONO PCT % 5   EOS PCT % 2     Results from last 7 days   Lab Units 05/23/24  0627   SODIUM mmol/L 140   POTASSIUM mmol/L 4.2   CHLORIDE mmol/L 102   CO2 mmol/L 33*   BUN mg/dL 34*   CREATININE mg/dL 1.21   ANION GAP mmol/L 5   CALCIUM mg/dL 8.8   ALBUMIN g/dL 3.1*   TOTAL BILIRUBIN mg/dL 0.64   ALK PHOS U/L 94   ALT U/L 11   AST U/L 13   GLUCOSE RANDOM mg/dL 119     Results from last 7 days   Lab Units 05/23/24  0627   INR  1.44*                   Lines/Drains:  Invasive Devices       Peripheral Intravenous Line   Duration             Peripheral IV 05/23/24 Right Antecubital <1 day                  Imaging: Personally reviewed the following imaging: chest xray  X-ray chest 1 view portable    (Results Pending)       ** Please Note: This note has been constructed using a voice recognition system. **

## 2024-05-23 NOTE — ED PROVIDER NOTES
History  Chief Complaint   Patient presents with    Shortness of Breath     C/O tightness across the chest, SOB, and vomited once. Was a refusal for EMS prior in the evening.      Patient is a 73-year-old female with a history of morbid obesity, CHF, A-fib that presents for evaluation of dyspnea.  Patient says over the past 24 hours she has developed dyspnea and orthopnea.  She says that her breathing is labored when she lays flat.  She is also has been having some issues swallowing and feels like she may have aspirated.  Patient vomited once prior to ED arrival.  She does report some chest tightness but denies abdominal pain urinary or bowel symptoms.  She has not take anything for symptoms.        Prior to Admission Medications   Prescriptions Last Dose Informant Patient Reported? Taking?   DULoxetine (CYMBALTA) 20 mg capsule 5/23/2024 Self No Yes   Sig: Take 1 capsule (20 mg total) by mouth daily   acetaminophen (TYLENOL) 650 mg CR tablet 5/23/2024 Self Yes Yes   Sig: Take 650 mg by mouth every 8 (eight) hours as needed (for osteoarthritis)   allopurinol (ZYLOPRIM) 100 mg tablet 5/22/2024 Self No Yes   Sig: Take 1 tablet (100 mg total) by mouth daily   furosemide (LASIX) 40 mg tablet 5/22/2024  No Yes   Sig: Take 1.5 tablets (60 mg total) by mouth daily   Patient taking differently: Take 60 mg by mouth 2 (two) times a day   gabapentin (NEURONTIN) 100 mg capsule 5/23/2024  No Yes   Sig: Take 1 capsule (100 mg total) by mouth 3 (three) times a day   iron polysaccharides (Ferrex 150) 150 mg capsule Not Taking  Yes No   Sig: Take 150 mg by mouth 2 (two) times a day. Indications: Anemia From Inadequate Iron in the Body   Patient not taking: Reported on 5/23/2024   levothyroxine 125 mcg tablet 5/22/2024 Self Yes Yes   Sig: Take 137 mcg by mouth daily   midodrine (PROAMATINE) 2.5 mg tablet   No No   Sig: Take 1 tablet (2.5 mg total) by mouth 3 (three) times a day before meals   Patient taking differently: Take 2.5 mg by  mouth 3 (three) times a day as needed   midodrine (PROAMATINE) 2.5 mg tablet 5/22/2024  Yes Yes   Sig: Take 2.5 mg by mouth 2 (two) times a day   nystatin (MYCOSTATIN) powder Not Taking  No No   Sig: Apply topically 2 (two) times a day   Patient not taking: Reported on 5/23/2024   pantoprazole (PROTONIX) 40 mg tablet Not Taking  No No   Sig: Take 1 tablet (40 mg total) by mouth daily in the early morning   Patient not taking: Reported on 5/23/2024   triamcinolone (KENALOG) 0.5 % cream 5/22/2024  Yes Yes   Sig: Apply 1 Application topically as needed for rash or irritation   warfarin (COUMADIN) 2 mg tablet 5/22/2024  Yes Yes   Sig: Take 4 mg by mouth daily. Indications: Atrial Fibrillation      Facility-Administered Medications: None       Past Medical History:   Diagnosis Date    Atrial fibrillation (HCC)     Bladder stone     C. difficile colitis     Cellulitis     CHF (congestive heart failure) (HCC)     Depression with anxiety     Disease of thyroid gland     Diverticulitis     Enterocolitis     History of abnormal cervical Pap smear     Kidney stone     Lymphedema     Menopause     Age 50    Morbid obesity with BMI of 70 and over, adult (HCC)     MRSA (methicillin resistant Staphylococcus aureus)     abdominal wound    Osteoarthritis     Phlebitis     left lower leg    Spondylolysis        Past Surgical History:   Procedure Laterality Date    APPENDECTOMY      CARPAL TUNNEL RELEASE      CHOLECYSTECTOMY      CYSTOSCOPY      stent    FOOT SURGERY  1982    bone spur    KNEE SURGERY      WISDOM TOOTH EXTRACTION         Family History   Problem Relation Age of Onset    Heart failure Mother     Heart disease Mother     Lymphoma Mother     Seizures Mother     Kidney disease Father     Heart disease Father     Heart failure Father     Diabetes type II Father     Diabetes type II Sister     Diabetes type II Brother     Uterine cancer Paternal Aunt     Breast cancer Neg Hx     Colon cancer Neg Hx     Ovarian cancer Neg Hx       I have reviewed and agree with the history as documented.    E-Cigarette/Vaping    E-Cigarette Use Never User      E-Cigarette/Vaping Substances    Nicotine No     THC No     CBD No     Flavoring No     Other No     Unknown No      Social History     Tobacco Use    Smoking status: Former     Current packs/day: 0.00     Average packs/day: 5.0 packs/day for 3.0 years (15.0 ttl pk-yrs)     Types: Cigarettes     Start date: 1967     Quit date: 1970     Years since quittin.9    Smokeless tobacco: Never    Tobacco comments:     5 packs/day until  (age 16-19) - As per [employer]    Vaping Use    Vaping status: Never Used   Substance Use Topics    Alcohol use: Not Currently    Drug use: Not Currently     Types: Marijuana     Comment: As a teen - As per [employer]        Review of Systems   Constitutional:  Negative for fever.   HENT:  Negative for sore throat.    Respiratory:  Positive for chest tightness and shortness of breath.    Cardiovascular:  Negative for chest pain.   Gastrointestinal:  Negative for abdominal pain.   Genitourinary:  Negative for dysuria.   Musculoskeletal:  Negative for back pain.   Skin:  Negative for rash.   Neurological:  Negative for light-headedness.   Psychiatric/Behavioral:  Negative for agitation.    All other systems reviewed and are negative.      Physical Exam  Physical Exam  Vitals reviewed.   Constitutional:       General: She is not in acute distress.     Appearance: She is well-developed. She is obese.   HENT:      Head: Normocephalic.   Eyes:      Pupils: Pupils are equal, round, and reactive to light.   Cardiovascular:      Rate and Rhythm: Normal rate and regular rhythm.      Heart sounds: Normal heart sounds.   Pulmonary:      Effort: Pulmonary effort is normal.      Comments: Rhonchorous breath sounds heard at the bases  Abdominal:      General: Bowel sounds are normal. There is no distension.      Palpations: Abdomen is soft.      Tenderness: There is no  abdominal tenderness. There is no guarding.   Musculoskeletal:         General: No tenderness or deformity. Normal range of motion.      Cervical back: Normal range of motion and neck supple.   Skin:     General: Skin is warm and dry.      Capillary Refill: Capillary refill takes less than 2 seconds.   Neurological:      Mental Status: She is alert and oriented to person, place, and time.      Cranial Nerves: No cranial nerve deficit.      Sensory: No sensory deficit.   Psychiatric:         Behavior: Behavior normal.         Thought Content: Thought content normal.         Judgment: Judgment normal.         Vital Signs  ED Triage Vitals   Temperature Pulse Respirations Blood Pressure SpO2   05/23/24 0724 05/23/24 0617 05/23/24 0617 05/23/24 0617 05/23/24 0617   97.5 °F (36.4 °C) 65 20 132/67 98 %      Temp Source Heart Rate Source Patient Position - Orthostatic VS BP Location FiO2 (%)   05/23/24 0724 05/23/24 0617 05/23/24 0617 05/23/24 0617 --   Tympanic Monitor Lying Left arm       Pain Score       05/23/24 0617       8           Vitals:    05/23/24 1500 05/23/24 1612 05/23/24 2217 05/24/24 0730   BP: 127/60 112/68 120/65 (!) 87/46   Pulse: 71 65 69    Patient Position - Orthostatic VS:  Lying Lying          Visual Acuity      ED Medications  Medications   sodium chloride (PF) 0.9 % injection 3 mL (has no administration in time range)   acetaminophen (TYLENOL) tablet 650 mg (650 mg Oral Given 5/24/24 0600)   allopurinol (ZYLOPRIM) tablet 100 mg (100 mg Oral Given 5/24/24 0932)   DULoxetine (CYMBALTA) delayed release capsule 20 mg (20 mg Oral Given 5/24/24 0932)   gabapentin (NEURONTIN) capsule 100 mg (100 mg Oral Given 5/24/24 0932)   iron polysaccharides (FERREX) capsule 150 mg (150 mg Oral Given 5/24/24 0932)   levothyroxine tablet 137 mcg (137 mcg Oral Given 5/24/24 0600)   midodrine (PROAMATINE) tablet 2.5 mg (2.5 mg Oral Given 5/24/24 0600)   nystatin (MYCOSTATIN) powder ( Topical Given 5/24/24 0934)    pantoprazole (PROTONIX) EC tablet 40 mg (40 mg Oral Not Given 5/24/24 0600)   warfarin (COUMADIN) tablet 4 mg (4 mg Oral Given 5/23/24 1700)   ondansetron (ZOFRAN) injection 4 mg (has no administration in time range)   ondansetron (ZOFRAN) injection 4 mg (4 mg Intravenous Given 5/23/24 0630)       Diagnostic Studies  Results Reviewed       Procedure Component Value Units Date/Time    Basic metabolic panel [[id number]]  (Abnormal) Collected: 05/24/24 0525    Lab Status: Final result Specimen: Blood from Arm, Left Updated: 05/24/24 0609     Sodium 137 mmol/L      Potassium 4.7 mmol/L      Chloride 101 mmol/L      CO2 32 mmol/L      ANION GAP 4 mmol/L      BUN 34 mg/dL      Creatinine 1.16 mg/dL      Glucose 118 mg/dL      Calcium 8.6 mg/dL      eGFR 47 ml/min/1.73sq m     Narrative:      National Kid[employer] Foundation guidelines for Chronic Kidney Disease (CKD):     Stage 1 with normal or high GFR (GFR > 90 mL/min/1.73 square meters)    Stage 2 Mild CKD (GFR = 60-89 mL/min/1.73 square meters)    Stage 3A Moderate CKD (GFR = 45-59 mL/min/1.73 square meters)    Stage 3B Moderate CKD (GFR = 30-44 mL/min/1.73 square meters)    Stage 4 Severe CKD (GFR = 15-29 mL/min/1.73 square meters)    Stage 5 End Stage CKD (GFR <15 mL/min/1.73 square meters)  Note: GFR calculation is accurate only with a steady state creatinine    Magnesium [[id number]]  (Normal) Collected: 05/24/24 0525    Lab Status: Final result Specimen: Blood from Arm, Left Updated: 05/24/24 0609     Magnesium 2.1 mg/dL     Protime-INR [[id number]]  (Abnormal) Collected: 05/24/24 0525    Lab Status: Final result Specimen: Blood from Arm, Left Updated: 05/24/24 0606     Protime 18.8 seconds      INR 1.57    CBC and differential [[id number]]  (Abnormal) Collected: 05/24/24 0525    Lab Status: Final result Specimen: Blood from Arm, Left Updated: 05/24/24 0558     WBC 14.45 Thousand/uL      RBC 4.25 Million/uL      Hemoglobin 10.9 g/dL      Hematocrit 38.0 %       MCV 89 fL      MCH 25.6 pg      MCHC 28.7 g/dL      RDW 16.4 %      MPV 8.8 fL      Platelets 384 Thousands/uL      nRBC 0 /100 WBCs      Segmented % 85 %      Immature Grans % 1 %      Lymphocytes % 8 %      Monocytes % 4 %      Eosinophils Relative 2 %      Basophils Relative 0 %      Absolute Neutrophils 12.37 Thousands/µL      Absolute Immature Grans 0.08 Thousand/uL      Absolute Lymphocytes 1.11 Thousands/µL      Absolute Monocytes 0.58 Thousand/µL      Eosinophils Absolute 0.26 Thousand/µL      Basophils Absolute 0.05 Thousands/µL     HS Troponin I 2hr [[id number]]  (Normal) Collected: 05/23/24 0821    Lab Status: Final result Specimen: Blood from Arm, Right Updated: 05/23/24 0854     hs TnI 2hr 7 ng/L      Delta 2hr hsTnI -1 ng/L     D-dimer, quantitative [[id number]]  (Normal) Collected: 05/23/24 0627    Lab Status: Final result Specimen: Blood Updated: 05/23/24 0824     D-Dimer, Quant <0.27 ug/ml FEU     Narrative:      In the evaluation for possible pulmonary embolism, in the appropriate (Well's Score of 4 or less) patient, the age adjusted d-dimer cutoff for this patient can be calculated as:    Age x 0.01 (in ug/mL) for Age-adjusted D-dimer exclusion threshold for a patient over 50 years.    Protime-INR [[id number]]  (Abnormal) Collected: 05/23/24 0627    Lab Status: Final result Specimen: Blood Updated: 05/23/24 0718     Protime 17.6 seconds      INR 1.44    HS Troponin 0hr (reflex protocol) [[id number]]  (Normal) Collected: 05/23/24 0627    Lab Status: Final result Specimen: Blood from Arm, Right Updated: 05/23/24 0710     hs TnI 0hr 8 ng/L     B-Type Natriuretic Peptide(BNP) [[id number]]  (Abnormal) Collected: 05/23/24 0627    Lab Status: Final result Specimen: Blood from Arm, Right Updated: 05/23/24 0710      pg/mL     Comprehensive metabolic panel [[id number]]  (Abnormal) Collected: 05/23/24 0627    Lab Status: Final result Specimen: Blood from Arm, Right Updated: 05/23/24 0650     Sodium 140  mmol/L      Potassium 4.2 mmol/L      Chloride 102 mmol/L      CO2 33 mmol/L      ANION GAP 5 mmol/L      BUN 34 mg/dL      Creatinine 1.21 mg/dL      Glucose 119 mg/dL      Calcium 8.8 mg/dL      Corrected Calcium 9.5 mg/dL      AST 13 U/L      ALT 11 U/L      Alkaline Phosphatase 94 U/L      Total Protein 7.8 g/dL      Albumin 3.1 g/dL      Total Bilirubin 0.64 mg/dL      eGFR 44 ml/min/1.73sq m     Narrative:      National Kidney Disease Foundation guidelines for Chronic Kidney Disease (CKD):     Stage 1 with normal or high GFR (GFR > 90 mL/min/1.73 square meters)    Stage 2 Mild CKD (GFR = 60-89 mL/min/1.73 square meters)    Stage 3A Moderate CKD (GFR = 45-59 mL/min/1.73 square meters)    Stage 3B Moderate CKD (GFR = 30-44 mL/min/1.73 square meters)    Stage 4 Severe CKD (GFR = 15-29 mL/min/1.73 square meters)    Stage 5 End Stage CKD (GFR <15 mL/min/1.73 square meters)  Note: GFR calculation is accurate only with a steady state creatinine    CBC and differential [[id number]]  (Abnormal) Collected: 05/23/24 0627    Lab Status: Final result Specimen: Blood from Arm, Right Updated: 05/23/24 0634     WBC 10.22 Thousand/uL      RBC 4.50 Million/uL      Hemoglobin 11.4 g/dL      Hematocrit 39.3 %      MCV 87 fL      MCH 25.3 pg      MCHC 29.0 g/dL      RDW 16.6 %      MPV 8.5 fL      Platelets 405 Thousands/uL      nRBC 0 /100 WBCs      Segmented % 78 %      Immature Grans % 0 %      Lymphocytes % 14 %      Monocytes % 5 %      Eosinophils Relative 2 %      Basophils Relative 1 %      Absolute Neutrophils 8.00 Thousands/µL      Absolute Immature Grans 0.03 Thousand/uL      Absolute Lymphocytes 1.42 Thousands/µL      Absolute Monocytes 0.47 Thousand/µL      Eosinophils Absolute 0.24 Thousand/µL      Basophils Absolute 0.06 Thousands/µL                    X-ray chest 1 view portable   Final Result by George Topete MD (05/23 1424)      No acute cardiopulmonary disease.            Workstation performed: [id number]                     Procedures  ECG 12 Lead Documentation Only    Date/Time: 5/24/2024 12:19 PM    Performed by: Santosh Victor MD  Authorized by: Santosh Victor MD    ECG reviewed by me, the ED Provider: yes    Patient location:  ED  Previous ECG:     Previous ECG:  Unavailable    Comparison to cardiac monitor: Yes    Interpretation:     Interpretation: abnormal    Rate:     ECG rate assessment: bradycardic    Rhythm:     Rhythm: atrial fibrillation    Ectopy:     Ectopy: none    QRS:     QRS axis:  Normal    QRS intervals:  Normal  Conduction:     Conduction: normal    ST segments:     ST segments:  Normal  T waves:     T waves: normal    CriticalCare Time    Date/Time: 5/24/2024 12:21 PM    Performed by: Santosh Victor MD  Authorized by: Santosh Victor MD    Critical care provider statement:     Critical care time (minutes):  35    Critical care time was exclusive of:  Separately billable procedures and treating other patients and teaching time    Critical care was necessary to treat or prevent imminent or life-threatening deterioration of the following conditions:  Respiratory failure    Critical care was time spent personally by me on the following activities:  Blood draw for specimens, obtaining history from patient or surrogate, evaluation of patient's response to treatment, examination of patient, re-evaluation of patient's condition, ordering and review of radiographic studies and ordering and review of laboratory studies    I assumed direction of critical care for this patient from another provider in my specialty: no             ED Course                               SBIRT 20yo+      Flowsheet Row Most Recent Value   Initial Alcohol Screen: US AUDIT-C     1. How often do you have a drink containing alcohol? 0 Filed at: 05/23/2024 0620   2. How many drinks containing alcohol do you have on a typical day you are drinking?  0 Filed at: 05/23/2024 0620   3a. Male UNDER 65: How often do you have five or  more drinks on one occasion? 0 Filed at: 05/23/2024 0620   3b. FEMALE Any Age, or MALE 65+: How often do you have 4 or more drinks on one occassion? 0 Filed at: 05/23/2024 0620   Audit-C Score 0 Filed at: 05/23/2024 0620   JYOTI: How many times in the past year have you...    Used an illegal drug or used a prescription medication for non-medical reasons? Never Filed at: 05/23/2024 0620                      Medical Decision Making  Patient is a 73-year-old female that presents for evaluation of dyspnea and hypoxia.  Patient is been dealing with some dyspnea, dropped to the 70s requiring supplemental oxygen.  Blood work reviewed and shows elevated BNP, mild pulmonary vascular congestion on chest x-ray.  No obvious ischemia on EKG.  Plan to admit to the hospital for further workup and management.    Amount and/or Complexity of Data Reviewed  Labs: ordered.  Radiology: ordered.    Risk  Prescription drug management.  Decision regarding hospitalization.             Disposition  Final diagnoses:   Dyspnea   Hypoxia     Time reflects when diagnosis was documented in both MDM as applicable and the Disposition within this note       Time User Action Codes Description Comment    5/23/2024  8:57 AM El Bojorquez Add [R06.00] Dyspnea     5/23/2024  8:57 AM El Bojorquez Add [R09.02] Hypoxia           ED Disposition       ED Disposition   Admit    Condition   Stable    Date/Time   u May 23, 2024 0857    Comment   Case was discussed with BRITANY and the patient's admission status was agreed to be Admission Status: inpatient status to the service of Dr. Verdugo .               Follow-up Information    None         Current Discharge Medication List        CONTINUE these medications which have NOT CHANGED    Details   acetaminophen (TYLENOL) 650 mg CR tablet Take 650 mg by mouth every 8 (eight) hours as needed (for osteoarthritis)      allopurinol (ZYLOPRIM) 100 mg tablet Take 1 tablet (100 mg total) by mouth daily  Qty: 30 tablet,  Refills: 5    Associated Diagnoses: Chronic gout without tophus, unspecified cause, unspecified site      DULoxetine (CYMBALTA) 20 mg capsule Take 1 capsule (20 mg total) by mouth daily  Qty: 30 capsule, Refills: 5    Associated Diagnoses: Depression, unspecified depression type      furosemide (LASIX) 40 mg tablet Take 1.5 tablets (60 mg total) by mouth daily  Qty: 45 tablet, Refills: 0    Associated Diagnoses: Chronic diastolic congestive heart failure (HCC)      gabapentin (NEURONTIN) 100 mg capsule Take 1 capsule (100 mg total) by mouth 3 (three) times a day  Qty: 90 capsule, Refills: 0    Associated Diagnoses: Herpes zoster without complication      levothyroxine 125 mcg tablet Take 137 mcg by mouth daily      midodrine (PROAMATINE) 2.5 mg tablet Take 2.5 mg by mouth 2 (two) times a day      triamcinolone (KENALOG) 0.5 % cream Apply 1 Application topically as needed for rash or irritation      warfarin (COUMADIN) 2 mg tablet Take 4 mg by mouth daily. Indications: Atrial Fibrillation      iron polysaccharides (Ferrex 150) 150 mg capsule Take 150 mg by mouth 2 (two) times a day. Indications: Anemia From Inadequate Iron in the Body      nystatin (MYCOSTATIN) powder Apply topically 2 (two) times a day  Qty: 30 g, Refills: 0    Associated Diagnoses: Panniculitis; Class 3 severe obesity due to excess calories with serious comorbidity and body mass index (BMI) greater than or equal to 70 in adult ([facility])      pantoprazole (PROTONIX) 40 mg tablet Take 1 tablet (40 mg total) by mouth daily in the early morning  Qty: 30 tablet, Refills: 0    Associated Diagnoses: Abdominal pain             No discharge procedures on file.    PDMP Review         Value Time User    PDMP Reviewed  Yes 5/27/2022 11:10 PM Anjelica Dey PA-C            ED Provider  Electronically Signed by             Santosh Victor MD  05/24/24 1569

## 2024-05-24 PROBLEM — T14.8XXA CHRONIC WOUND: Status: ACTIVE | Noted: 2024-05-24

## 2024-05-24 LAB
ANION GAP SERPL CALCULATED.3IONS-SCNC: 4 MMOL/L (ref 4–13)
BASOPHILS # BLD AUTO: 0.05 THOUSANDS/ÂΜL (ref 0–0.1)
BASOPHILS NFR BLD AUTO: 0 % (ref 0–1)
BUN SERPL-MCNC: 34 MG/DL (ref 5–25)
CALCIUM SERPL-MCNC: 8.6 MG/DL (ref 8.4–10.2)
CHLORIDE SERPL-SCNC: 101 MMOL/L (ref 96–108)
CO2 SERPL-SCNC: 32 MMOL/L (ref 21–32)
CREAT SERPL-MCNC: 1.16 MG/DL (ref 0.6–1.3)
EOSINOPHIL # BLD AUTO: 0.26 THOUSAND/ÂΜL (ref 0–0.61)
EOSINOPHIL NFR BLD AUTO: 2 % (ref 0–6)
ERYTHROCYTE [DISTWIDTH] IN BLOOD BY AUTOMATED COUNT: 16.4 % (ref 11.6–15.1)
GFR SERPL CREATININE-BSD FRML MDRD: 47 ML/MIN/1.73SQ M
GLUCOSE SERPL-MCNC: 118 MG/DL (ref 65–140)
HCT VFR BLD AUTO: 38 % (ref 34.8–46.1)
HGB BLD-MCNC: 10.9 G/DL (ref 11.5–15.4)
IMM GRANULOCYTES # BLD AUTO: 0.08 THOUSAND/UL (ref 0–0.2)
IMM GRANULOCYTES NFR BLD AUTO: 1 % (ref 0–2)
INR PPP: 1.57 (ref 0.84–1.19)
LYMPHOCYTES # BLD AUTO: 1.11 THOUSANDS/ÂΜL (ref 0.6–4.47)
LYMPHOCYTES NFR BLD AUTO: 8 % (ref 14–44)
MAGNESIUM SERPL-MCNC: 2.1 MG/DL (ref 1.9–2.7)
MCH RBC QN AUTO: 25.6 PG (ref 26.8–34.3)
MCHC RBC AUTO-ENTMCNC: 28.7 G/DL (ref 31.4–37.4)
MCV RBC AUTO: 89 FL (ref 82–98)
MONOCYTES # BLD AUTO: 0.58 THOUSAND/ÂΜL (ref 0.17–1.22)
MONOCYTES NFR BLD AUTO: 4 % (ref 4–12)
NEUTROPHILS # BLD AUTO: 12.37 THOUSANDS/ÂΜL (ref 1.85–7.62)
NEUTS SEG NFR BLD AUTO: 85 % (ref 43–75)
NRBC BLD AUTO-RTO: 0 /100 WBCS
PLATELET # BLD AUTO: 384 THOUSANDS/UL (ref 149–390)
PMV BLD AUTO: 8.8 FL (ref 8.9–12.7)
POTASSIUM SERPL-SCNC: 4.7 MMOL/L (ref 3.5–5.3)
PROTHROMBIN TIME: 18.8 SECONDS (ref 11.6–14.5)
RBC # BLD AUTO: 4.25 MILLION/UL (ref 3.81–5.12)
SODIUM SERPL-SCNC: 137 MMOL/L (ref 135–147)
WBC # BLD AUTO: 14.45 THOUSAND/UL (ref 4.31–10.16)

## 2024-05-24 PROCEDURE — 85025 COMPLETE CBC W/AUTO DIFF WBC: CPT | Performed by: INTERNAL MEDICINE

## 2024-05-24 PROCEDURE — 83735 ASSAY OF MAGNESIUM: CPT | Performed by: INTERNAL MEDICINE

## 2024-05-24 PROCEDURE — 80048 BASIC METABOLIC PNL TOTAL CA: CPT | Performed by: INTERNAL MEDICINE

## 2024-05-24 PROCEDURE — 99232 SBSQ HOSP IP/OBS MODERATE 35: CPT | Performed by: INTERNAL MEDICINE

## 2024-05-24 PROCEDURE — 85610 PROTHROMBIN TIME: CPT | Performed by: INTERNAL MEDICINE

## 2024-05-24 RX ORDER — FUROSEMIDE 10 MG/ML
40 INJECTION INTRAMUSCULAR; INTRAVENOUS ONCE
Status: COMPLETED | OUTPATIENT
Start: 2024-05-24 | End: 2024-05-24

## 2024-05-24 RX ADMIN — Medication 150 MG: at 09:32

## 2024-05-24 RX ADMIN — ALLOPURINOL 100 MG: 100 TABLET ORAL at 09:32

## 2024-05-24 RX ADMIN — GABAPENTIN 100 MG: 100 CAPSULE ORAL at 09:32

## 2024-05-24 RX ADMIN — FUROSEMIDE 40 MG: 10 INJECTION, SOLUTION INTRAMUSCULAR; INTRAVENOUS at 16:45

## 2024-05-24 RX ADMIN — GABAPENTIN 100 MG: 100 CAPSULE ORAL at 16:45

## 2024-05-24 RX ADMIN — MIDODRINE HYDROCHLORIDE 2.5 MG: 5 TABLET ORAL at 16:45

## 2024-05-24 RX ADMIN — LEVOTHYROXINE SODIUM 137 MCG: 25 TABLET ORAL at 06:00

## 2024-05-24 RX ADMIN — MIDODRINE HYDROCHLORIDE 2.5 MG: 5 TABLET ORAL at 12:27

## 2024-05-24 RX ADMIN — MIDODRINE HYDROCHLORIDE 2.5 MG: 5 TABLET ORAL at 06:00

## 2024-05-24 RX ADMIN — NYSTATIN: 100000 POWDER TOPICAL at 09:34

## 2024-05-24 RX ADMIN — GABAPENTIN 100 MG: 100 CAPSULE ORAL at 20:30

## 2024-05-24 RX ADMIN — ACETAMINOPHEN 650 MG: 325 TABLET ORAL at 20:30

## 2024-05-24 RX ADMIN — DULOXETINE HYDROCHLORIDE 20 MG: 20 CAPSULE, DELAYED RELEASE ORAL at 09:32

## 2024-05-24 RX ADMIN — ACETAMINOPHEN 650 MG: 325 TABLET ORAL at 06:00

## 2024-05-24 RX ADMIN — WARFARIN SODIUM 4 MG: 2 TABLET ORAL at 18:31

## 2024-05-24 NOTE — ASSESSMENT & PLAN NOTE
Likely multifactorial secondary to fluid overload, obesity hypoventilation, undiagnosed sleep apnea  Continue oxygen, titrate as tolerated  Treatment for suspected CHF exacerbation as above  Respiratory protocol  Chest x-ray within normal months

## 2024-05-24 NOTE — ASSESSMENT & PLAN NOTE
Wt Readings from Last 3 Encounters:   05/24/24 (!) 196 kg (431 lb 10.6 oz)   03/05/24 (!) 186 kg (411 lb)   01/19/24 (!) 187 kg (411 lb 13.1 oz)     Patient with worsening dyspnea and orthopnea.  BNP slightly elevated in the ER, chest x-ray with mild vascular congestion per my review  Echo from January 2024 with EF of 55%  Patient states that she has been taking her oral Lasix at home and monitoring her fluid intake  Continue Lasix 40 mg IV as needed  Monitor AN AM's Daily weight  Currently on 2 L nasal cannula, will titrate as tolerated

## 2024-05-24 NOTE — WOUND OSTOMY CARE
Consult Note - Wound   Zee Kirk 73 y.o. female MRN: [id number]  Unit/Bed#: -01 Encounter: [id number]    History and Present Illness:  Patient is 73 year old female admitted to [facility] with panniculitis. Patient history significant for morbid obesity with a BMI of 74.09, chronic lymphedema. recurrent cellulitis, C-diff colitis, AFIB, CHF and hypothyroidism, she is bed bound. Wound care consulted for wounds to the folds.  Patient seen on prior admissions by wound care team for recurrent wounds to the body folds. She refuses Maxorb/ Ag, Maxorb rope, Interdry. Patient has her preference for skin fold breakdown. She has Nystatin powder ordered and has Kenalog cream ordered for the wounds to the abdominal fold. She will allow ABD pads to be placed in the folds and Xeroform to the knee and thigh folds.    Assessment Findings:   Patient in bed for assessment, she is awake, alert and oriented. Bariatric bed has been ordered and is ready to transfer patient to bed.  She is an assist X3 to turn onto her side for assessment. She has a purewick in place for urinary management, and is not incontinent of stool. She is pleasant and cooperative. She is a moderate assist with hygiene care and is able to feed herself. She has an extensive rash to the back and in the folds of the legs, abdomen, breasts, and on the buttocks. Unknown etiology of the rash, suspect rash from heat as it is in folds and on the back, it is not in the front of the trunk/body, except for abdominal and breast folds.     1- Bilateral heels blanchable and scaly.  2- POA- Folds to the knees-thighs with beefy red rash in the folds  3- POA- Abdominal fold wounds, partial thickness, yellow-red wound bases, fungal odor from fold, abdominal fold beefy red rash with scant serosanguineous drainage  4- POA-Right and left breast folds beefy red rash, no drainage  5- POA-left and right lateral hip wounds, partial thickness, scant serosanguineous drainage.  Calazime applied.    Discussed with Dr. Verdugo    Skin and Wound Care Plan:   1- Apply skin nourishing cream to the skin daily  2- Apply Hydraguard to b/l heels BID and PRN  3- Turn and reposition patient Q2 hours  4- Cleanse folds to b/l legs-thighs with Remedy foaming cleanser, pat dry. Place Xeroform in folds of abdomen and legs, change every other day and PRN. Place ABD pads in the abdominal fold to control moisture.  5- Cleanse right and left lateral hip wounds with Remedy foaming cleanser, apply Calazime to the areas of skin breakdown TID and PRN    Wound:  Wound 02/28/24 Other (comment) Abdomen Lateral;Right (Active)   Wound Image   05/24/24 1529   Wound Description Beefy red;Yellow 05/24/24 1529   Alison-wound Assessment Rash 05/24/24 1529   Wound Length (cm) 2 cm 05/24/24 1529   Wound Width (cm) 4.2 cm 05/24/24 1529   Wound Depth (cm) 0.1 cm 05/24/24 1529   Wound Surface Area (cm^2) 8.4 cm^2 05/24/24 1529   Wound Volume (cm^3) 0.84 cm^3 05/24/24 1529   Calculated Wound Volume (cm^3) 0.84 cm^3 05/24/24 1529   Change in Wound Size % -460 05/24/24 1529   Number of tunnels 1 05/24/24 1529   Drainage Amount Scant 05/24/24 1529   Drainage Description Foul smelling;Serosanguineous 05/24/24 1529   Non-staged Wound Description Partial thickness 05/24/24 1529   Treatments Cleansed 05/24/24 1529   Dressing ABD 05/24/24 1529   Patient Tolerance Tolerated well 05/24/24 1529       Wound 03/19/24 Other (comment) Hip Left;Posterior (Active)       Wound 05/24/24 Thigh Anterior;Left (Active)   Wound Image    05/24/24 1524   Wound Description Beefy red 05/24/24 1524   Alison-wound Assessment Rash 05/24/24 1524   Wound Length (cm) 9 cm 05/24/24 1524   Wound Width (cm) 16 cm 05/24/24 1524   Wound Depth (cm) 0.1 cm 05/24/24 1524   Wound Surface Area (cm^2) 144 cm^2 05/24/24 1524   Wound Volume (cm^3) 14.4 cm^3 05/24/24 1524   Calculated Wound Volume (cm^3) 14.4 cm^3 05/24/24 1524   Drainage Amount Small 05/24/24 1524   Drainage  Description Serous 05/24/24 1524   Non-staged Wound Description Partial thickness 05/24/24 1524   Treatments Cleansed 05/24/24 1524   Dressing Protective barrier 05/24/24 1524   Patient Tolerance Tolerated well 05/24/24 1524     Reviewed plan of care with primary RN  Recommendations written as orders  Wound care team to follow weekly while admitted  Questions or concerns [facility] Wound Care Nurse    Liv ELIZONDON, RN, CWON

## 2024-05-24 NOTE — ASSESSMENT & PLAN NOTE
Patient BMI 73.  Patient is bedbound and does not ambulate at home  Continue supportive care  Bariatric bed ordered

## 2024-05-24 NOTE — PLAN OF CARE
Problem: Potential for Falls  Goal: Patient will remain free of falls  Description: INTERVENTIONS:  - Educate patient/family on patient safety including physical limitations  - Instruct patient to call for assistance with activity   - Consult OT/PT to assist with strengthening/mobility   - Keep Call bell within reach  - Keep bed low and locked with side rails adjusted as appropriate  - Keep care items and personal belongings within reach  - Initiate and maintain comfort rounds  - Make Fall Risk Sign visible to staff  - Offer Toileting every 2 Hours, in advance of need  - Initiate/Maintain bed alarm  - Obtain necessary fall risk management equipment: bed alarm  - Apply yellow socks and bracelet for high fall risk patients  - Consider moving patient to room near nurses station  Outcome: Progressing     Problem: RESPIRATORY - ADULT  Goal: Achieves optimal ventilation and oxygenation  Description: INTERVENTIONS:  - Assess for changes in respiratory status  - Assess for changes in mentation and behavior  - Position to facilitate oxygenation and minimize respiratory effort  - Oxygen administered by appropriate delivery if ordered  - Initiate smoking cessation education as indicated  - Encourage broncho-pulmonary hygiene including cough, deep breathe, Incentive Spirometry  - Assess the need for suctioning and aspirate as needed  - Assess and instruct to report SOB or any respiratory difficulty  - Respiratory Therapy support as indicated  Outcome: Progressing     Problem: PAIN - ADULT  Goal: Verbalizes/displays adequate comfort level or baseline comfort level  Description: Interventions:  - Encourage patient to monitor pain and request assistance  - Assess pain using appropriate pain scale  - Administer analgesics based on type and severity of pain and evaluate response  - Implement non-pharmacological measures as appropriate and evaluate response  - Consider cultural and social influences on pain and pain management  -  Notify physician/advanced practitioner if interventions unsuccessful or patient reports new pain  Outcome: Progressing     Problem: INFECTION - ADULT  Goal: Absence or prevention of progression during hospitalization  Description: INTERVENTIONS:  - Assess and monitor for signs and symptoms of infection  - Monitor lab/diagnostic results  - Monitor all insertion sites, i.e. indwelling lines, tubes, and drains  - Monitor endotracheal if appropriate and nasal secretions for changes in amount and color  - [place] appropriate cooling/warming therapies per order  - Administer medications as ordered  - Instruct and encourage patient and family to use good hand hygiene technique  - Identify and instruct in appropriate isolation precautions for identified infection/condition  Outcome: Progressing  Goal: Absence of fever/infection during neutropenic period  Description: INTERVENTIONS:  - Monitor WBC    Outcome: Progressing     Problem: DISCHARGE PLANNING  Goal: Discharge to home or other facility with appropriate resources  Description: INTERVENTIONS:  - Identify barriers to discharge w/patient and caregiver  - Arrange for needed discharge resources and transportation as appropriate  - Identify discharge learning needs (meds, wound care, etc.)  - Arrange for interpretive services to assist at discharge as needed  - Refer to Case Management Department for coordinating discharge planning if the patient needs post-hospital services based on physician/advanced practitioner order or complex needs related to functional status, cognitive ability, or social support system  Outcome: Progressing     Problem: Knowledge Deficit  Goal: Patient/family/caregiver demonstrates understanding of disease process, treatment plan, medications, and discharge instructions  Description: Complete learning assessment and assess knowledge base.  Interventions:  - Provide teaching at level of understanding  - Provide teaching via preferred learning  methods  Outcome: Progressing

## 2024-05-24 NOTE — ASSESSMENT & PLAN NOTE
Patient with multiple chronic wounds  Wound care team consulted  Continue local wound care as needed

## 2024-05-24 NOTE — PROGRESS NOTES
[facility]  Progress Note  Name: Zee Kirk I  MRN: [id number]  Unit/Bed#: -01 I Date of Admission: 5/23/2024   Date of Service: 5/24/2024 I Hospital Day: 1    Assessment & Plan   * Acute on chronic diastolic congestive heart failure (HCC)  Assessment & Plan  Wt Readings from Last 3 Encounters:   05/24/24 (!) 196 kg (431 lb 10.6 oz)   03/05/24 (!) 186 kg (411 lb)   01/19/24 (!) 187 kg (411 lb 13.1 oz)     Patient with worsening dyspnea and orthopnea.  BNP slightly elevated in the ER, chest x-ray with mild vascular congestion per my review  Echo from January 2024 with EF of 55%  Patient states that she has been taking her oral Lasix at home and monitoring her fluid intake  Continue Lasix 40 mg IV as needed  Monitor ANA M's Daily weight  Currently on 2 L nasal cannula, will titrate as tolerated    Acute respiratory failure with hypoxia (HCC)  Assessment & Plan  Likely multifactorial secondary to fluid overload, obesity hypoventilation, undiagnosed sleep apnea  Continue oxygen, titrate as tolerated  Treatment for suspected CHF exacerbation as above  Respiratory protocol  Chest x-ray within normal months    Chronic atrial fibrillation (HCC)  Assessment & Plan  Currently rate controlled  Continue to monitor  On Coumadin for anticoagulation    Class 3 severe obesity due to excess calories with serious comorbidity and body mass index (BMI) greater than or equal to 70 in adult (HCC)  Assessment & Plan  Patient BMI 73.  Patient is bedbound and does not ambulate at home  Continue supportive care  Bariatric bed ordered           VTE Pharmacologic Prophylaxis: VTE Score: 5 Moderate Risk (Score 3-4) - Pharmacological DVT Prophylaxis Ordered: warfarin (Coumadin).    Mobility:   Basic Mobility Inpatient Raw Score: 8  JH-HLM Goal: 3: Sit at edge of bed  JH-HLM Achieved: 2: Bed activities/Dependent transfer  JH-HLM Goal achieved. Continue to encourage appropriate mobility.    Patient Centered  Rounds: I performed bedside rounds with nursing staff today.   Discussions with Specialists or Other Care Team Provider: yes    Education and Discussions with Family / Patient:  Updated patient regarding plan of care.     Current Length of Stay: 1 day(s)  Current Patient Status: Inpatient   Certification Statement: The patient will continue to require additional inpatient hospital stay due to fluid overload  Discharge Plan: Anticipate discharge in 24-48 hrs to home with home services.    Code Status: Level 1 - Full Code    Subjective:   No overnight events noted.  Patient still with shortness of breath.  No fever or chills.  Tolerating diet    Objective:     Vitals:   Temp (24hrs), Av °F (36.7 °C), Min:97.7 °F (36.5 °C), Max:98.3 °F (36.8 °C)    Temp:  [97.7 °F (36.5 °C)-98.3 °F (36.8 °C)] 98.3 °F (36.8 °C)  HR:  [65-69] 69  Resp:  [19-22] 20  BP: ()/(46-68) 106/56  SpO2:  [94 %-97 %] 94 %  Body mass index is 74.09 kg/m².     Input and Output Summary (last 24 hours):     Intake/Output Summary (Last 24 hours) at 2024 1541  Last data filed at 2024 1300  Gross per 24 hour   Intake 1080 ml   Output 750 ml   Net 330 ml       Physical Exam:   Physical Exam  Constitutional:       General: She is not in acute distress.     Appearance: She is obese.   HENT:      Head: Normocephalic and atraumatic.      Nose: Nose normal.      Mouth/Throat:      Mouth: Mucous membranes are moist.   Eyes:      Extraocular Movements: Extraocular movements intact.      Conjunctiva/sclera: Conjunctivae normal.   Cardiovascular:      Rate and Rhythm: Normal rate and regular rhythm.   Pulmonary:      Effort: Pulmonary effort is normal. No respiratory distress.   Abdominal:      Palpations: Abdomen is soft.      Tenderness: There is no abdominal tenderness.   Musculoskeletal:         General: Normal range of motion.      Cervical back: Normal range of motion and neck supple.      Comments: Generalized weakness.  Chronically  bedbound   Skin:     General: Skin is warm and dry.      Findings: Lesion present.   Neurological:      General: No focal deficit present.      Mental Status: She is alert. Mental status is at baseline.      Cranial Nerves: No cranial nerve deficit.   Psychiatric:         Mood and Affect: Mood normal.         Behavior: Behavior normal.          Additional Data:     Labs:  Results from last 7 days   Lab Units 05/24/24  0525   WBC Thousand/uL 14.45*   HEMOGLOBIN g/dL 10.9*   HEMATOCRIT % 38.0   PLATELETS Thousands/uL 384   SEGS PCT % 85*   LYMPHO PCT % 8*   MONO PCT % 4   EOS PCT % 2     Results from last 7 days   Lab Units 05/24/24  0525 05/23/24  0627   SODIUM mmol/L 137 140   POTASSIUM mmol/L 4.7 4.2   CHLORIDE mmol/L 101 102   CO2 mmol/L 32 33*   BUN mg/dL 34* 34*   CREATININE mg/dL 1.16 1.21   ANION GAP mmol/L 4 5   CALCIUM mg/dL 8.6 8.8   ALBUMIN g/dL  --  3.1*   TOTAL BILIRUBIN mg/dL  --  0.64   ALK PHOS U/L  --  94   ALT U/L  --  11   AST U/L  --  13   GLUCOSE RANDOM mg/dL 118 119     Results from last 7 days   Lab Units 05/24/24  0525   INR  1.57*                   Lines/Drains:  Invasive Devices       Peripheral Intravenous Line  Duration             Peripheral IV 05/23/24 Right Antecubital 1 day              Drain  Duration             External Urinary Catheter <1 day                    Imaging: No pertinent imaging reviewed.    Recent Cultures (last 7 days):         Last 24 Hours Medication List:   Current Facility-Administered Medications   Medication Dose Route Frequency Provider Last Rate    acetaminophen  650 mg Oral Q6H PRN Yane Verdugo MD      allopurinol  100 mg Oral Daily Yane Verdugo MD      DULoxetine  20 mg Oral Daily Yane Verdugo MD      furosemide  40 mg Intravenous Once Yane Verdugo MD      gabapentin  100 mg Oral TID Yane Verdugo MD      iron polysaccharides  150 mg Oral Daily Yane Verdugo MD      levothyroxine  137 mcg Oral  Early Morning Yane Verdugo MD      midodrine  2.5 mg Oral TID AC Yane Verdugo MD      nystatin   Topical BID Yane Verdugo MD      ondansetron  4 mg Intravenous Q6H PRN Yane Verdugo MD      pantoprazole  40 mg Oral Early Morning Yane Verdugo MD      sodium chloride (PF)  3 mL Intravenous Q1H PRN Yane Verdugo MD      warfarin  4 mg Oral Daily (warfarin) Yane Verdugo MD          Today, Patient Was Seen By: Yane Verdugo MD    **Please Note: This note may have been constructed using a voice recognition system.**

## 2024-05-24 NOTE — DISCHARGE INSTR - OTHER ORDERS
Skin and Wound Care Plan:   1- Apply skin nourishing cream to the skin daily  2- Apply Hydraguard to b/l heels BID and PRN  3- Turn and reposition patient Q2 hours  4- Cleanse folds to b/l legs-thighs with Remedy foaming cleanser, pat dry. Place Xeroform in folds of abdomen and legs, change every other day and PRN. Place ABD pads in the abdominal fold to control moisture.  5- Cleanse right and left lateral hip wounds with Remedy foaming cleanser, apply Calazime to the areas of skin breakdown TID and PRN

## 2024-05-25 LAB
ANION GAP SERPL CALCULATED.3IONS-SCNC: 4 MMOL/L (ref 4–13)
BASOPHILS # BLD AUTO: 0.06 THOUSANDS/ÂΜL (ref 0–0.1)
BASOPHILS NFR BLD AUTO: 1 % (ref 0–1)
BUN SERPL-MCNC: 37 MG/DL (ref 5–25)
CALCIUM SERPL-MCNC: 8.7 MG/DL (ref 8.4–10.2)
CHLORIDE SERPL-SCNC: 100 MMOL/L (ref 96–108)
CO2 SERPL-SCNC: 33 MMOL/L (ref 21–32)
CREAT SERPL-MCNC: 1.2 MG/DL (ref 0.6–1.3)
EOSINOPHIL # BLD AUTO: 0.29 THOUSAND/ÂΜL (ref 0–0.61)
EOSINOPHIL NFR BLD AUTO: 2 % (ref 0–6)
ERYTHROCYTE [DISTWIDTH] IN BLOOD BY AUTOMATED COUNT: 16.4 % (ref 11.6–15.1)
GFR SERPL CREATININE-BSD FRML MDRD: 45 ML/MIN/1.73SQ M
GLUCOSE SERPL-MCNC: 120 MG/DL (ref 65–140)
HCT VFR BLD AUTO: 37.6 % (ref 34.8–46.1)
HGB BLD-MCNC: 10.5 G/DL (ref 11.5–15.4)
IMM GRANULOCYTES # BLD AUTO: 0.1 THOUSAND/UL (ref 0–0.2)
IMM GRANULOCYTES NFR BLD AUTO: 1 % (ref 0–2)
INR PPP: 1.57 (ref 0.84–1.19)
LYMPHOCYTES # BLD AUTO: 1.38 THOUSANDS/ÂΜL (ref 0.6–4.47)
LYMPHOCYTES NFR BLD AUTO: 11 % (ref 14–44)
MCH RBC QN AUTO: 24.8 PG (ref 26.8–34.3)
MCHC RBC AUTO-ENTMCNC: 27.9 G/DL (ref 31.4–37.4)
MCV RBC AUTO: 89 FL (ref 82–98)
MONOCYTES # BLD AUTO: 0.67 THOUSAND/ÂΜL (ref 0.17–1.22)
MONOCYTES NFR BLD AUTO: 5 % (ref 4–12)
NEUTROPHILS # BLD AUTO: 10.63 THOUSANDS/ÂΜL (ref 1.85–7.62)
NEUTS SEG NFR BLD AUTO: 80 % (ref 43–75)
NRBC BLD AUTO-RTO: 0 /100 WBCS
PLATELET # BLD AUTO: 378 THOUSANDS/UL (ref 149–390)
PMV BLD AUTO: 8.7 FL (ref 8.9–12.7)
POTASSIUM SERPL-SCNC: 4.7 MMOL/L (ref 3.5–5.3)
PROTHROMBIN TIME: 18.8 SECONDS (ref 11.6–14.5)
RBC # BLD AUTO: 4.24 MILLION/UL (ref 3.81–5.12)
SODIUM SERPL-SCNC: 137 MMOL/L (ref 135–147)
WBC # BLD AUTO: 13.13 THOUSAND/UL (ref 4.31–10.16)

## 2024-05-25 PROCEDURE — 85025 COMPLETE CBC W/AUTO DIFF WBC: CPT | Performed by: INTERNAL MEDICINE

## 2024-05-25 PROCEDURE — 80048 BASIC METABOLIC PNL TOTAL CA: CPT | Performed by: INTERNAL MEDICINE

## 2024-05-25 PROCEDURE — 99232 SBSQ HOSP IP/OBS MODERATE 35: CPT | Performed by: INTERNAL MEDICINE

## 2024-05-25 PROCEDURE — 85610 PROTHROMBIN TIME: CPT | Performed by: INTERNAL MEDICINE

## 2024-05-25 RX ORDER — FUROSEMIDE 10 MG/ML
40 INJECTION INTRAMUSCULAR; INTRAVENOUS ONCE
Status: COMPLETED | OUTPATIENT
Start: 2024-05-25 | End: 2024-05-25

## 2024-05-25 RX ORDER — FUROSEMIDE 10 MG/ML
40 INJECTION INTRAMUSCULAR; INTRAVENOUS DAILY
Status: DISCONTINUED | OUTPATIENT
Start: 2024-05-26 | End: 2024-05-28 | Stop reason: HOSPADM

## 2024-05-25 RX ORDER — DOXYCYCLINE HYCLATE 100 MG/1
100 CAPSULE ORAL EVERY 12 HOURS SCHEDULED
Status: DISCONTINUED | OUTPATIENT
Start: 2024-05-25 | End: 2024-05-28 | Stop reason: HOSPADM

## 2024-05-25 RX ORDER — WARFARIN SODIUM 5 MG/1
5 TABLET ORAL
Status: DISCONTINUED | OUTPATIENT
Start: 2024-05-25 | End: 2024-05-26

## 2024-05-25 RX ADMIN — GABAPENTIN 100 MG: 100 CAPSULE ORAL at 20:57

## 2024-05-25 RX ADMIN — MIDODRINE HYDROCHLORIDE 2.5 MG: 5 TABLET ORAL at 06:04

## 2024-05-25 RX ADMIN — DOXYCYCLINE 100 MG: 100 CAPSULE ORAL at 20:57

## 2024-05-25 RX ADMIN — GABAPENTIN 100 MG: 100 CAPSULE ORAL at 08:36

## 2024-05-25 RX ADMIN — NYSTATIN: 100000 POWDER TOPICAL at 17:23

## 2024-05-25 RX ADMIN — MIDODRINE HYDROCHLORIDE 2.5 MG: 5 TABLET ORAL at 17:22

## 2024-05-25 RX ADMIN — MIDODRINE HYDROCHLORIDE 2.5 MG: 5 TABLET ORAL at 13:24

## 2024-05-25 RX ADMIN — Medication 150 MG: at 08:36

## 2024-05-25 RX ADMIN — LEVOTHYROXINE SODIUM 137 MCG: 25 TABLET ORAL at 05:25

## 2024-05-25 RX ADMIN — WARFARIN SODIUM 5 MG: 5 TABLET ORAL at 17:22

## 2024-05-25 RX ADMIN — NYSTATIN: 100000 POWDER TOPICAL at 08:37

## 2024-05-25 RX ADMIN — DULOXETINE HYDROCHLORIDE 20 MG: 20 CAPSULE, DELAYED RELEASE ORAL at 08:36

## 2024-05-25 RX ADMIN — FUROSEMIDE 40 MG: 10 INJECTION, SOLUTION INTRAMUSCULAR; INTRAVENOUS at 15:32

## 2024-05-25 RX ADMIN — ALLOPURINOL 100 MG: 100 TABLET ORAL at 08:36

## 2024-05-25 RX ADMIN — GABAPENTIN 100 MG: 100 CAPSULE ORAL at 15:32

## 2024-05-25 NOTE — PLAN OF CARE
Problem: Prexisting or High Potential for Compromised Skin Integrity  Goal: Skin integrity is maintained or improved  Description: INTERVENTIONS:  - Identify patients at risk for skin breakdown  - Assess and monitor skin integrity  - Assess and monitor nutrition and hydration status  - Monitor labs   - Assess for incontinence   - Turn and reposition patient  - Assist with mobility/ambulation  - Relieve pressure over bony prominences  - Avoid friction and shearing  - Provide appropriate hygiene as needed including keeping skin clean and dry  - Evaluate need for skin moisturizer/barrier cream  - Collaborate with interdisciplinary team   - Patient/family teaching  - Consider wound care consult   Monitor wounds   Outcome: Progressing

## 2024-05-25 NOTE — ASSESSMENT & PLAN NOTE
Wt Readings from Last 3 Encounters:   05/25/24 (!) 196 kg (431 lb)   03/05/24 (!) 186 kg (411 lb)   01/19/24 (!) 187 kg (411 lb 13.1 oz)     Patient with worsening dyspnea and orthopnea.  BNP slightly elevated in the ER, chest x-ray with mild vascular congestion per my review  Echo from January 2024 with EF of 55%  Patient states that she has been taking her oral Lasix at home and monitoring her fluid intake  Will continue Lasix 40 mg IV daily  Monitor ANA M's Daily weight  Titrate oxygen as tolerated.  Patient was requiring up to 2 L nasal cannula

## 2024-05-25 NOTE — PLAN OF CARE
Problem: Potential for Falls  Goal: Patient will remain free of falls  Description: INTERVENTIONS:  - Educate patient/family on patient safety including physical limitations  - Instruct patient to call for assistance with activity   - Consult OT/PT to assist with strengthening/mobility   - Keep Call bell within reach  - Keep bed low and locked with side rails adjusted as appropriate  - Keep care items and personal belongings within reach  - Initiate and maintain comfort rounds  - Make Fall Risk Sign visible to staff  - Offer Toileting every 2 Hours, in advance of need  - Initiate/Maintain bed alarm  - Obtain necessary fall risk management equipment  - Apply yellow socks and bracelet for high fall risk patients  - Consider moving patient to room near nurses station  Outcome: Progressing     Problem: RESPIRATORY - ADULT  Goal: Achieves optimal ventilation and oxygenation  Description: INTERVENTIONS:  - Assess for changes in respiratory status  - Assess for changes in mentation and behavior  - Position to facilitate oxygenation and minimize respiratory effort  - Oxygen administered by appropriate delivery if ordered  - Initiate smoking cessation education as indicated  - Encourage broncho-pulmonary hygiene including cough, deep breathe, Incentive Spirometry  - Assess the need for suctioning and aspirate as needed  - Assess and instruct to report SOB or any respiratory difficulty  - Respiratory Therapy support as indicated  Outcome: Progressing     Problem: PAIN - ADULT  Goal: Verbalizes/displays adequate comfort level or baseline comfort level  Description: Interventions:  - Encourage patient to monitor pain and request assistance  - Assess pain using appropriate pain scale  - Administer analgesics based on type and severity of pain and evaluate response  - Implement non-pharmacological measures as appropriate and evaluate response  - Consider cultural and social influences on pain and pain management  - Notify  physician/advanced practitioner if interventions unsuccessful or patient reports new pain  Outcome: Progressing     Problem: INFECTION - ADULT  Goal: Absence or prevention of progression during hospitalization  Description: INTERVENTIONS:  - Assess and monitor for signs and symptoms of infection  - Monitor lab/diagnostic results  - Monitor all insertion sites, i.e. indwelling lines, tubes, and drains  - Monitor endotracheal if appropriate and nasal secretions for changes in amount and color  - [place] appropriate cooling/warming therapies per order  - Administer medications as ordered  - Instruct and encourage patient and family to use good hand hygiene technique  - Identify and instruct in appropriate isolation precautions for identified infection/condition  Outcome: Progressing  Goal: Absence of fever/infection during neutropenic period  Description: INTERVENTIONS:  - Monitor WBC    Outcome: Progressing     Problem: DISCHARGE PLANNING  Goal: Discharge to home or other facility with appropriate resources  Description: INTERVENTIONS:  - Identify barriers to discharge w/patient and caregiver  - Arrange for needed discharge resources and transportation as appropriate  - Identify discharge learning needs (meds, wound care, etc.)  - Arrange for interpretive services to assist at discharge as needed  - Refer to Case Management Department for coordinating discharge planning if the patient needs post-hospital services based on physician/advanced practitioner order or complex needs related to functional status, cognitive ability, or social support system  Outcome: Progressing     Problem: Knowledge Deficit  Goal: Patient/family/caregiver demonstrates understanding of disease process, treatment plan, medications, and discharge instructions  Description: Complete learning assessment and assess knowledge base.  Interventions:  - Provide teaching at level of understanding  - Provide teaching via preferred learning  methods  Outcome: Progressing     Problem: Prexisting or High Potential for Compromised Skin Integrity  Goal: Skin integrity is maintained or improved  Description: INTERVENTIONS:  - Identify patients at risk for skin breakdown  - Assess and monitor skin integrity  - Assess and monitor nutrition and hydration status  - Monitor labs   - Assess for incontinence   - Turn and reposition patient  - Assist with mobility/ambulation  - Relieve pressure over bony prominences  - Avoid friction and shearing  - Provide appropriate hygiene as needed including keeping skin clean and dry  - Evaluate need for skin moisturizer/barrier cream  - Collaborate with interdisciplinary team   - Patient/family teaching  - Consider wound care consult   Outcome: Progressing

## 2024-05-25 NOTE — CASE MANAGEMENT
Case Management Assessment & Discharge Planning Note    Patient name Zee Kirk  Location /-01 MRN [id number]  : 1950 Date 2024       Current Admission Date: 2024  Current Admission Diagnosis:Acute on chronic diastolic congestive heart failure (HCC)   Patient Active Problem List    Diagnosis Date Noted Date Diagnosed    Chronic wound 2024     Acute respiratory failure with hypoxia (HCC) 2024     Chest discomfort 2024     Left leg pain 2023     Shingles 2023     Cellulitis- Ruled Out 2023     Chronic atrial fibrillation (HCC) 2022     History of Clostridioides difficile infection 2022     Lymphedema of extremity 2021     Other specified hypothyroidism 10/03/2020     Mild episode of recurrent major depressive disorder (HCC) 10/03/2020     Idiopathic chronic 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 10/03/2020     Primary osteoarthritis involving multiple joints 10/03/2020     Class 3 severe obesity due to excess calories with serious comorbidity and body mass index (BMI) greater than or equal to 70 in adult ([facility]) 10/03/2020     Acute on chronic diastolic congestive heart failure (HCC) 10/03/2020     Panniculitis 10/03/2020     Gastroesophageal reflux disease without esophagitis 10/03/2020     Hx MRSA infection 2020     Impaired mobility 2019     Osteoarthritis of glenohumeral joint 2019     Left ovarian cyst 2017     Depression with anxiety 07/15/2017     Anemia 2016     Ambulatory dysfunction 2016     Adjustment disorder 2013     Irritable bowel syndrome 2012     Left atrial enlargement 2012     Left ventricular hypertrophy 2012     Carpal tunnel syndrome 2012     Gout 2012     Hyperlipidemia 2012     Symptomatic menopausal or female climacteric states 2012       LOS (days): 1  Geometric Mean LOS (GMLOS) (days): 3  Days to GMLOS:1.5      OBJECTIVE:    Risk of Unplanned Readmission Score: 23.14         Current admission status: Inpatient  Referral Reason: Other (d/c planning)    Preferred Pharmacy:   [facility] - [place] - [address]  [address]  [place] [address]  Phone: [phone] Fax: [phone]    Primary Care Provider: Franklyn Caruso MD    Primary Insurance: MEDICARE  Secondary Insurance: AARP    ASSESSMENT:  Active Health Care Proxies       BasilioRobbie Alternate Health Care Representative - Nephew   Primary Phone: [phone] (Mobile)                 Advance Directives  Does patient have a Health Care POA?: Yes  Does patient have Advance Directives?: Yes         Readmission Root Cause  30 Day Readmission: No    Patient Information  Admitted from:: Home  Mental Status: Alert  During Assessment patient was accompanied by: Not accompanied during assessment  Assessment information provided by:: Patient  Primary Caregiver: Other (Comment) (caregiver)  Caregiver's Name:: Waltkelly  Caregiver's Relationship to Patient:: Other (Specify)  Caregiver's Telephone Number:: [phone]  Support Systems: Family members (nephew & niece)  County of Residence: [facility] do you live in[place]  Home entry access options. Select all that apply.: Ramp  Type of Current Residence: 2 story home  Upon entering residence, is there a bedroom on the main floor (no further steps)?: Yes (1st floor has a hospital bed)  Upon entering residence, is there a bathroom on the main floor (no further steps)?: Yes (1/2 bath 1st floor- full bath 2nd - stair lift in the home)  Living Arrangements: Other (Comment), Lives Alone (pt has caregivers from 8am-8pm 7 days per week)  Is patient a ?: No    Activities of Daily Living Prior to Admission  Functional Status: Total dependent  Completes ADLs independently?: No  Level of ADL dependence: Total Dependent  Ambulates independently?: No  Level of ambulatory  dependence: Total Dependent  Does patient use assisted devices?: Yes  Assisted Devices (DME) used: Walker, Wheelchair, Hospital Bed, Stair Chair/Glide, Bedside Commode (adapthealth for her hospital bed)  Does patient currently own DME?: Yes  What DME does the patient currently own?: Walker, Wheelchair, Hospital Bed, Stair Chair/Glide, Bedside Commode, Other (Comment) (bed pan, bp cuff, pulse ox,)  Does patient have a history of Outpatient Therapy (PT/OT)?: Yes ([facility])  Does the patient have a history of Short-Term Rehab?: Yes ([facility], [facility], [facility])  Does patient have a history of HHC?: Yes ([facility])  Does patient currently have HHC?: No         Patient Information Continued  Income Source: Pension/skilled nursing  Does patient have prescription coverage?: Yes ([employer]- mail order)  Does patient have a history of substance abuse?: No  Does patient have a history of Mental Health Diagnosis?: Yes (depression & anxiety)  Is patient receiving treatment for mental health?: Yes (medication from pcp)  Has patient received inpatient treatment related to mental health in the last 2 years?: No         Means of Transportation  [facility] Other (Comment[facility] (medical transport)      Social Determinants of Health (SDOH)      Flowsheet Row Most Recent Value   Housing Stability    In the last 12 months, was there a time when you were not able to pay the mortgage or rent on time? N   In the past 12 months, how many times have you moved where you were living? 1   At any time in the past 12 months, were you homeless or living in a shelter (including now)? N   Transportation Needs    In the past 12 months, has lack of transportation kept you from medical appointments or from getting medications? no   In the past 12 months, has lack of transportation kept you from meetings, work, or from getting things needed for daily living? No   Food Insecurity    Within the past 12 months, you worried that  your food would run out before you got the money to buy more. Never true   Within the past 12 months, the food you bought just didn't last and you didn't have money to get more. Never true   Utilities    In the past 12 months has the electric, gas, oil, or water company threatened to shut off services in your home? No            DISCHARGE DETAILS:    Discharge planning discussed with:: wilfred called [facility] at 15:07pm & made them aware of weekend d/c - they need need 24hr notice- Robbie was called at 18:32 pm LM and he called back at 18:37pm  Freedom of Choice: Yes  Comments - Freedom of Choice: pt is active with [facility] - referrals sent via [facility]                Contacts  Patient Contacts: Robbie Richmond  & Byron caregivers  Relationship to Patient:: Family (nephew)  Contact Method: Phone  Phone Number: [phone](nephew)     [phone]  ( [facility] caregivers)  Reason/Outcome: Discharge Planning    Requested Home Health Care         Is the patient interested in HHC at discharge?: No    DME Referral Provided  Referral made for DME?: No    Other Referral/Resources/Interventions Provided:  Interventions: Other (Specify)  Referral Comments: referral sent to [facility]  -pt stated she is active with them    Would you like to participate in our [facility] service program?  : No - Declined    Treatment Team Recommendation: Home (home with caregiver& [facility] vs [facility] & outpt follow up- BLS)                                            Additional Comments: pt states she has virtual vists

## 2024-05-25 NOTE — ASSESSMENT & PLAN NOTE
Patient with multiple chronic wounds  Wound care team consulted  Continue local wound care as needed  Patient did have some increased erythema/weeping of right thigh.  Will initiate doxycycline 100 mg twice daily for 5-day course.  Also with mild leukocytosis.  Follow-up CBC tomorrow. (Patient has multiple antibiotic allergies)

## 2024-05-25 NOTE — CASE MANAGEMENT
Case Management Discharge Planning Note    Patient name Zee Kirk  Location /-01 MRN [id number]  : 1950 Date 2024       Current Admission Date: 2024  Current Admission Diagnosis:Acute on chronic diastolic congestive heart failure (HCC)   Patient Active Problem List    Diagnosis Date Noted Date Diagnosed    Chronic wound 2024     Acute respiratory failure with hypoxia (HCC) 2024     Chest discomfort 2024     Left leg pain 2023     Shingles 2023     Cellulitis- Ruled Out 2023     Chronic atrial fibrillation (HCC) 2022     History of Clostridioides difficile infection 2022     Lymphedema of extremity 2021     Other specified hypothyroidism 10/03/2020     Mild episode of recurrent major depressive disorder (HCC) 10/03/2020     Idiopathic chronic 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 10/03/2020     Primary osteoarthritis involving multiple joints 10/03/2020     Class 3 severe obesity due to excess calories with serious comorbidity and body mass index (BMI) greater than or equal to 70 in adult ([facility]) 10/03/2020     Acute on chronic diastolic congestive heart failure (HCC) 10/03/2020     Panniculitis 10/03/2020     Gastroesophageal reflux disease without esophagitis 10/03/2020     Hx MRSA infection 2020     Impaired mobility 2019     Osteoarthritis of glenohumeral joint 2019     Left ovarian cyst 2017     Depression with anxiety 07/15/2017     Anemia 2016     Ambulatory dysfunction 2016     Adjustment disorder 2013     Irritable bowel syndrome 2012     Left atrial enlargement 2012     Left ventricular hypertrophy 2012     Carpal tunnel syndrome 2012     Gout 2012     Hyperlipidemia 2012     Symptomatic menopausal or female climacteric states 2012       LOS (days): 2  Geometric Mean LOS (GMLOS) (days): 3  Days to GMLOS:0.7     OBJECTIVE:  Risk  of Unplanned Readmission Score: 24.08         Current admission status: Inpatient   Preferred Pharmacy:   [facility] [place] - [address]  [address]  [place] [address]  Phone: [phone] Fax: [phone]    Primary Care Provider: Franklyn Caruso MD    Primary Insurance: MEDICARE  Secondary Insurance: AARP    DISCHARGE DETAILS:    Discharge planning discussed with:: cm placed a call to [facility] at 16:38 pm  to let them know that the pt will be stable on Monday  Freedom of Choice: Yes  Comments - Freedom of Choice: pt is active with [facility] - referral was sent via [facility]         Is the patient interested in HHC at discharge?: No         Other Referral/Resources/Interventions Provided:  Interventions: Outpatient OT, Outpatient PT, Outpatient ST  Referral Comments: pt is active with [facility]    Would you like to participate in our [facility] service program?  : No - Declined    Treatment Team Recommendation: Home (home with caregiver &[facility] & outpt follow up - BLS)

## 2024-05-25 NOTE — PROGRESS NOTES
[facility] hospital  Progress Note  Name: Zee Kirk I  MRN: [id number]  Unit/Bed#: -01 I Date of Admission: 5/23/2024   Date of Service: 5/25/2024 I Hospital Day: 2    Assessment & Plan   * Acute on chronic diastolic congestive heart failure (HCC)  Assessment & Plan  Wt Readings from Last 3 Encounters:   05/25/24 (!) 196 kg (431 lb)   03/05/24 (!) 186 kg (411 lb)   01/19/24 (!) 187 kg (411 lb 13.1 oz)     Patient with worsening dyspnea and orthopnea.  BNP slightly elevated in the ER, chest x-ray with mild vascular congestion per my review  Echo from January 2024 with EF of 55%  Patient states that she has been taking her oral Lasix at home and monitoring her fluid intake  Will continue Lasix 40 mg IV daily  Monitor ANA M's Daily weight  Titrate oxygen as tolerated.  Patient was requiring up to 2 L nasal cannula    Acute respiratory failure with hypoxia ([facility])  Assessment & Plan  Likely multifactorial secondary to fluid overload, obesity hypoventilation, undiagnosed sleep apnea  Continue oxygen, titrate as tolerated  Treatment for suspected CHF exacerbation as above  Respiratory protocol  Chest x-ray within normal months    Chronic wound  Assessment & Plan  Patient with multiple chronic wounds  Wound care team consulted  Continue local wound care as needed  Patient did have some increased erythema/weeping of right thigh.  Will initiate doxycycline 100 mg twice daily for 5-day course.  Also with mild leukocytosis.  Follow-up CBC tomorrow. (Patient has multiple antibiotic allergies)    Chronic atrial fibrillation ([facility])  Assessment & Plan  Currently rate controlled  Continue to monitor  On Coumadin for anticoagulation    Class 3 severe obesity due to excess calories with serious comorbidity and body mass index (BMI) greater than or equal to 70 in adult ([facility])  Assessment & Plan  Patient BMI 73.  Patient is bedbound and does not ambulate at home  Continue supportive care  Bariatric bed  ordered             VTE Pharmacologic Prophylaxis: VTE Score: 5 Moderate Risk (Score 3-4) - Pharmacological DVT Prophylaxis Ordered: warfarin (Coumadin).    Mobility:   Basic Mobility Inpatient Raw Score: 8  -HLM Goal: 3: Sit at edge of bed  JH-HLM Achieved: 2: Bed activities/Dependent transfer  JH-HLM Goal achieved. Continue to encourage appropriate mobility.    Patient Centered Rounds: I performed bedside rounds with nursing staff today.   Discussions with Specialists or Other Care Team Provider: yes    Education and Discussions with Family / Patient:  Updated patient regarding plan of care.     Current Length of Stay: 2 day(s)  Current Patient Status: Inpatient   Certification Statement: The patient will continue to require additional inpatient hospital stay due to fluid overload  Discharge Plan: Anticipate discharge in 24-48 hrs to home with home services.    Code Status: Level 1 - Full Code    Subjective:   No overnight events noted.  Patient still with shortness of breath.  Currently afebrile.      Objective:     Vitals:   Temp (24hrs), Av °F (36.7 °C), Min:97.8 °F (36.6 °C), Max:98.3 °F (36.8 °C)    Temp:  [97.8 °F (36.6 °C)-98.3 °F (36.8 °C)] 97.9 °F (36.6 °C)  HR:  [71-72] 71  Resp:  [18-20] 20  BP: (106-118)/(50-57) 118/50  SpO2:  [96 %-97 %] 97 %  Body mass index is 73.98 kg/m².     Input and Output Summary (last 24 hours):     Intake/Output Summary (Last 24 hours) at 2024 1455  Last data filed at 2024 1100  Gross per 24 hour   Intake 1320 ml   Output 600 ml   Net 720 ml       Physical Exam:   Physical Exam  Constitutional:       General: She is not in acute distress.     Appearance: She is obese.   HENT:      Head: Normocephalic and atraumatic.      Nose: Nose normal.      Mouth/Throat:      Mouth: Mucous membranes are moist.   Eyes:      Extraocular Movements: Extraocular movements intact.      Conjunctiva/sclera: Conjunctivae normal.   Cardiovascular:      Rate and Rhythm: Normal rate  and regular rhythm.   Pulmonary:      Effort: Pulmonary effort is normal. No respiratory distress.   Abdominal:      Palpations: Abdomen is soft.      Tenderness: There is no abdominal tenderness.   Musculoskeletal:         General: Normal range of motion.      Cervical back: Normal range of motion and neck supple.      Right lower leg: Edema present.      Left lower leg: Edema present.   Skin:     General: Skin is warm and dry.      Findings: Erythema present.   Neurological:      General: No focal deficit present.      Mental Status: She is alert. Mental status is at baseline.      Cranial Nerves: No cranial nerve deficit.      Comments: Chronic weakness of bilateral lower extremities (patient nonambulatory, chronically bedbound)   Psychiatric:         Mood and Affect: Mood normal.         Behavior: Behavior normal.          Additional Data:     Labs:  Results from last 7 days   Lab Units 05/25/24  0517   WBC Thousand/uL 13.13*   HEMOGLOBIN g/dL 10.5*   HEMATOCRIT % 37.6   PLATELETS Thousands/uL 378   SEGS PCT % 80*   LYMPHO PCT % 11*   MONO PCT % 5   EOS PCT % 2     Results from last 7 days   Lab Units 05/25/24  0517 05/24/24  0525 05/23/24  0627   SODIUM mmol/L 137   < > 140   POTASSIUM mmol/L 4.7   < > 4.2   CHLORIDE mmol/L 100   < > 102   CO2 mmol/L 33*   < > 33*   BUN mg/dL 37*   < > 34*   CREATININE mg/dL 1.20   < > 1.21   ANION GAP mmol/L 4   < > 5   CALCIUM mg/dL 8.7   < > 8.8   ALBUMIN g/dL  --   --  3.1*   TOTAL BILIRUBIN mg/dL  --   --  0.64   ALK PHOS U/L  --   --  94   ALT U/L  --   --  11   AST U/L  --   --  13   GLUCOSE RANDOM mg/dL 120   < > 119    < > = values in this interval not displayed.     Results from last 7 days   Lab Units 05/25/24  0517   INR  1.57*                   Lines/Drains:  Invasive Devices       Peripheral Intravenous Line  Duration             Peripheral IV 05/23/24 Right Antecubital 2 days              Drain  Duration             External Urinary Catheter 1 day                           Imaging: No pertinent imaging reviewed.    Recent Cultures (last 7 days):         Last 24 Hours Medication List:   Current Facility-Administered Medications   Medication Dose Route Frequency Provider Last Rate    acetaminophen  650 mg Oral Q6H PRN Yane Verdugo MD      allopurinol  100 mg Oral Daily Yane Verdugo MD      doxycycline hyclate  100 mg Oral Q12H TEODORO Yane Verdugo MD      DULoxetine  20 mg Oral Daily Yane Verdugo MD      furosemide  40 mg Intravenous Once Yane Verdugo MD      [START ON 5/26/2024] furosemide  40 mg Intravenous Daily Yane Verdugo MD      gabapentin  100 mg Oral TID Yane Verdugo MD      iron polysaccharides  150 mg Oral Daily Yane Verdugo MD      levothyroxine  137 mcg Oral Early Morning Yane Verdugo MD      midodrine  2.5 mg Oral TID AC Yane Verdugo MD      nystatin   Topical BID Yane Verdugo MD      ondansetron  4 mg Intravenous Q6H PRN Yane Verdugo MD      pantoprazole  40 mg Oral Early Morning Yane Verdugo MD      sodium chloride (PF)  3 mL Intravenous Q1H PRN Yane Verdugo MD      warfarin  5 mg Oral Daily (warfarin) Yane Verdugo MD          Today, Patient Was Seen By: Yane Verdugo MD    **Please Note: This note may have been constructed using a voice recognition system.**

## 2024-05-26 PROBLEM — R79.1 SUBTHERAPEUTIC INTERNATIONAL NORMALIZED RATIO (INR): Status: ACTIVE | Noted: 2024-05-26

## 2024-05-26 PROBLEM — I48.11 LONGSTANDING PERSISTENT ATRIAL FIBRILLATION (HCC): Status: ACTIVE | Noted: 2022-02-18

## 2024-05-26 PROBLEM — E65 PANNICULUS: Status: ACTIVE | Noted: 2024-05-24

## 2024-05-26 PROBLEM — J96.01 ACUTE RESPIRATORY FAILURE WITH HYPOXIA (HCC): Status: RESOLVED | Noted: 2024-05-23 | Resolved: 2024-05-26

## 2024-05-26 PROBLEM — T07.XXXA MULTIPLE OPEN WOUNDS: Status: ACTIVE | Noted: 2024-05-24

## 2024-05-26 LAB
ANION GAP SERPL CALCULATED.3IONS-SCNC: 5 MMOL/L (ref 4–13)
BASOPHILS # BLD AUTO: 0.05 THOUSANDS/ÂΜL (ref 0–0.1)
BASOPHILS NFR BLD AUTO: 0 % (ref 0–1)
BUN SERPL-MCNC: 38 MG/DL (ref 5–25)
CALCIUM SERPL-MCNC: 8.4 MG/DL (ref 8.4–10.2)
CHLORIDE SERPL-SCNC: 99 MMOL/L (ref 96–108)
CO2 SERPL-SCNC: 31 MMOL/L (ref 21–32)
CREAT SERPL-MCNC: 1.16 MG/DL (ref 0.6–1.3)
EOSINOPHIL # BLD AUTO: 0.26 THOUSAND/ÂΜL (ref 0–0.61)
EOSINOPHIL NFR BLD AUTO: 2 % (ref 0–6)
ERYTHROCYTE [DISTWIDTH] IN BLOOD BY AUTOMATED COUNT: 16.1 % (ref 11.6–15.1)
GFR SERPL CREATININE-BSD FRML MDRD: 47 ML/MIN/1.73SQ M
GLUCOSE SERPL-MCNC: 112 MG/DL (ref 65–140)
HCT VFR BLD AUTO: 36.8 % (ref 34.8–46.1)
HGB BLD-MCNC: 10.6 G/DL (ref 11.5–15.4)
IMM GRANULOCYTES # BLD AUTO: 0.08 THOUSAND/UL (ref 0–0.2)
IMM GRANULOCYTES NFR BLD AUTO: 1 % (ref 0–2)
INR PPP: 1.67 (ref 0.84–1.19)
LYMPHOCYTES # BLD AUTO: 1.08 THOUSANDS/ÂΜL (ref 0.6–4.47)
LYMPHOCYTES NFR BLD AUTO: 9 % (ref 14–44)
MCH RBC QN AUTO: 25.4 PG (ref 26.8–34.3)
MCHC RBC AUTO-ENTMCNC: 28.8 G/DL (ref 31.4–37.4)
MCV RBC AUTO: 88 FL (ref 82–98)
MONOCYTES # BLD AUTO: 0.63 THOUSAND/ÂΜL (ref 0.17–1.22)
MONOCYTES NFR BLD AUTO: 5 % (ref 4–12)
NEUTROPHILS # BLD AUTO: 10.15 THOUSANDS/ÂΜL (ref 1.85–7.62)
NEUTS SEG NFR BLD AUTO: 83 % (ref 43–75)
NRBC BLD AUTO-RTO: 0 /100 WBCS
PLATELET # BLD AUTO: 380 THOUSANDS/UL (ref 149–390)
PMV BLD AUTO: 8.7 FL (ref 8.9–12.7)
POTASSIUM SERPL-SCNC: 4.1 MMOL/L (ref 3.5–5.3)
PROTHROMBIN TIME: 19.7 SECONDS (ref 11.6–14.5)
RBC # BLD AUTO: 4.18 MILLION/UL (ref 3.81–5.12)
SODIUM SERPL-SCNC: 135 MMOL/L (ref 135–147)
WBC # BLD AUTO: 12.25 THOUSAND/UL (ref 4.31–10.16)

## 2024-05-26 PROCEDURE — 85025 COMPLETE CBC W/AUTO DIFF WBC: CPT | Performed by: INTERNAL MEDICINE

## 2024-05-26 PROCEDURE — 85610 PROTHROMBIN TIME: CPT | Performed by: INTERNAL MEDICINE

## 2024-05-26 PROCEDURE — 80048 BASIC METABOLIC PNL TOTAL CA: CPT | Performed by: INTERNAL MEDICINE

## 2024-05-26 PROCEDURE — 99232 SBSQ HOSP IP/OBS MODERATE 35: CPT

## 2024-05-26 RX ORDER — WARFARIN SODIUM 7.5 MG/1
7.5 TABLET ORAL
Status: DISCONTINUED | OUTPATIENT
Start: 2024-05-26 | End: 2024-05-28 | Stop reason: HOSPADM

## 2024-05-26 RX ORDER — CLOTRIMAZOLE 1 %
CREAM (GRAM) TOPICAL 2 TIMES DAILY
Status: DISCONTINUED | OUTPATIENT
Start: 2024-05-26 | End: 2024-05-28 | Stop reason: HOSPADM

## 2024-05-26 RX ORDER — FUROSEMIDE 10 MG/ML
40 INJECTION INTRAMUSCULAR; INTRAVENOUS ONCE
Status: DISCONTINUED | OUTPATIENT
Start: 2024-05-26 | End: 2024-05-28 | Stop reason: HOSPADM

## 2024-05-26 RX ADMIN — MIDODRINE HYDROCHLORIDE 2.5 MG: 5 TABLET ORAL at 16:15

## 2024-05-26 RX ADMIN — CLOTRIMAZOLE: 1 CREAM TOPICAL at 15:17

## 2024-05-26 RX ADMIN — LEVOTHYROXINE SODIUM 137 MCG: 25 TABLET ORAL at 05:35

## 2024-05-26 RX ADMIN — MIDODRINE HYDROCHLORIDE 2.5 MG: 5 TABLET ORAL at 12:00

## 2024-05-26 RX ADMIN — ALLOPURINOL 100 MG: 100 TABLET ORAL at 08:08

## 2024-05-26 RX ADMIN — FUROSEMIDE 40 MG: 10 INJECTION, SOLUTION INTRAMUSCULAR; INTRAVENOUS at 08:08

## 2024-05-26 RX ADMIN — GABAPENTIN 100 MG: 100 CAPSULE ORAL at 16:15

## 2024-05-26 RX ADMIN — WARFARIN SODIUM 7.5 MG: 7.5 TABLET ORAL at 17:08

## 2024-05-26 RX ADMIN — DULOXETINE HYDROCHLORIDE 20 MG: 20 CAPSULE, DELAYED RELEASE ORAL at 08:08

## 2024-05-26 RX ADMIN — MIDODRINE HYDROCHLORIDE 2.5 MG: 5 TABLET ORAL at 06:05

## 2024-05-26 RX ADMIN — DOXYCYCLINE 100 MG: 100 CAPSULE ORAL at 08:08

## 2024-05-26 RX ADMIN — ONDANSETRON 4 MG: 2 INJECTION INTRAMUSCULAR; INTRAVENOUS at 17:06

## 2024-05-26 RX ADMIN — GABAPENTIN 100 MG: 100 CAPSULE ORAL at 21:20

## 2024-05-26 RX ADMIN — GABAPENTIN 100 MG: 100 CAPSULE ORAL at 08:08

## 2024-05-26 RX ADMIN — NYSTATIN: 100000 POWDER TOPICAL at 09:00

## 2024-05-26 RX ADMIN — DOXYCYCLINE 100 MG: 100 CAPSULE ORAL at 21:20

## 2024-05-26 NOTE — PLAN OF CARE
Problem: Potential for Falls  Goal: Patient will remain free of falls  Description: INTERVENTIONS:  - Educate patient/family on patient safety including physical limitations  - Instruct patient to call for assistance with activity   - Consult OT/PT to assist with strengthening/mobility   - Keep Call bell within reach  - Keep bed low and locked with side rails adjusted as appropriate  - Keep care items and personal belongings within reach  - Initiate and maintain comfort rounds  - Make Fall Risk Sign visible to staff  - Offer Toileting every 2 Hours, in advance of need  - Initiate/Maintain bed alarm  - Obtain necessary fall risk management equipment  - Apply yellow socks and bracelet for high fall risk patients  - Consider moving patient to room near nurses station  Outcome: Progressing     Problem: RESPIRATORY - ADULT  Goal: Achieves optimal ventilation and oxygenation  Description: INTERVENTIONS:  - Assess for changes in respiratory status  - Assess for changes in mentation and behavior  - Position to facilitate oxygenation and minimize respiratory effort  - Oxygen administered by appropriate delivery if ordered  - Initiate smoking cessation education as indicated  - Encourage broncho-pulmonary hygiene including cough, deep breathe, Incentive Spirometry  - Assess the need for suctioning and aspirate as needed  - Assess and instruct to report SOB or any respiratory difficulty  - Respiratory Therapy support as indicated  Outcome: Progressing     Problem: PAIN - ADULT  Goal: Verbalizes/displays adequate comfort level or baseline comfort level  Description: Interventions:  - Encourage patient to monitor pain and request assistance  - Assess pain using appropriate pain scale  - Administer analgesics based on type and severity of pain and evaluate response  - Implement non-pharmacological measures as appropriate and evaluate response  - Consider cultural and social influences on pain and pain management  - Notify  physician/advanced practitioner if interventions unsuccessful or patient reports new pain  Outcome: Progressing     Problem: INFECTION - ADULT  Goal: Absence or prevention of progression during hospitalization  Description: INTERVENTIONS:  - Assess and monitor for signs and symptoms of infection  - Monitor lab/diagnostic results  - Monitor all insertion sites, i.e. indwelling lines, tubes, and drains  - Monitor endotracheal if appropriate and nasal secretions for changes in amount and color  - [place] appropriate cooling/warming therapies per order  - Administer medications as ordered  - Instruct and encourage patient and family to use good hand hygiene technique  - Identify and instruct in appropriate isolation precautions for identified infection/condition  Outcome: Progressing  Goal: Absence of fever/infection during neutropenic period  Description: INTERVENTIONS:  - Monitor WBC    Outcome: Progressing     Problem: DISCHARGE PLANNING  Goal: Discharge to home or other facility with appropriate resources  Description: INTERVENTIONS:  - Identify barriers to discharge w/patient and caregiver  - Arrange for needed discharge resources and transportation as appropriate  - Identify discharge learning needs (meds, wound care, etc.)  - Arrange for interpretive services to assist at discharge as needed  - Refer to Case Management Department for coordinating discharge planning if the patient needs post-hospital services based on physician/advanced practitioner order or complex needs related to functional status, cognitive ability, or social support system  Outcome: Progressing     Problem: Knowledge Deficit  Goal: Patient/family/caregiver demonstrates understanding of disease process, treatment plan, medications, and discharge instructions  Description: Complete learning assessment and assess knowledge base.  Interventions:  - Provide teaching at level of understanding  - Provide teaching via preferred learning  methods  Outcome: Progressing     Problem: Prexisting or High Potential for Compromised Skin Integrity  Goal: Skin integrity is maintained or improved  Description: INTERVENTIONS:  - Identify patients at risk for skin breakdown  - Assess and monitor skin integrity  - Assess and monitor nutrition and hydration status  - Monitor labs   - Assess for incontinence   - Turn and reposition patient  - Assist with mobility/ambulation  - Relieve pressure over bony prominences  - Avoid friction and shearing  - Provide appropriate hygiene as needed including keeping skin clean and dry  - Evaluate need for skin moisturizer/barrier cream  - Collaborate with interdisciplinary team   - Patient/family teaching  - Consider wound care consult   Outcome: Progressing

## 2024-05-26 NOTE — ASSESSMENT & PLAN NOTE
Patient with multiple chronic wounds  Patient notes increased erythema/weeping of right thigh and right abdominal panniculus.  WBC trending down and remains afebrile.   Continue doxycycline 100 mg BID

## 2024-05-26 NOTE — CASE MANAGEMENT
Case Management Discharge Planning Note    Patient name Zee Kirk  Location /-01 MRN [id number]  : 1950 Date 2024       Current Admission Date: 2024  Current Admission Diagnosis:Acute on chronic diastolic congestive heart failure (HCC)   Patient Active Problem List    Diagnosis Date Noted Date Diagnosed    Subtherapeutic international normalized ratio (INR) 2024     Multiple open wounds 2024     Chest discomfort 2024     Left leg pain 2023     Shingles 2023     Cellulitis- Ruled Out 2023     Longstanding persistent atrial fibrillation (HCC) 2022     History of Clostridioides difficile infection 2022     Lymphedema of extremity 2021     Other specified hypothyroidism 10/03/2020     Mild episode of recurrent major depressive disorder (HCC) 10/03/2020     Idiopathic chronic 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 10/03/2020     Primary osteoarthritis involving multiple joints 10/03/2020     Class 3 severe obesity due to excess calories with serious comorbidity and body mass index (BMI) greater than or equal to 70 in adult ([facility]) 10/03/2020     Acute on chronic diastolic congestive heart failure (HCC) 10/03/2020     Panniculitis 10/03/2020     Gastroesophageal reflux disease without esophagitis 10/03/2020     Hx MRSA infection 2020     Impaired mobility 2019     Osteoarthritis of glenohumeral joint 2019     Left ovarian cyst 2017     Depression with anxiety 07/15/2017     Anemia 2016     Ambulatory dysfunction 2016     Adjustment disorder 2013     Irritable bowel syndrome 2012     Left atrial enlargement 2012     Left ventricular hypertrophy 2012     Carpal tunnel syndrome 2012     Gout 2012     Hyperlipidemia 2012     Symptomatic menopausal or female climacteric states 2012       LOS (days): 3  Geometric Mean LOS (GMLOS) (days): 3  Days to  GMLOS:-0.3     OBJECTIVE:  Risk of Unplanned Readmission Score: 24.58         Current admission status: Inpatient   Preferred Pharmacy:   [facility] [place] - [address]  [address]  [place] [address]  Phone: [phone] Fax: [phone]    Primary Care Provider: Franklyn Caruso MD    Primary Insurance: MEDICARE  Secondary Insurance: AARP    DISCHARGE DETAILS:    Discharge planning discussed with:: cm called [facility] at 9:559 am LM & spoke with the on call team at 11:52 am and called  pt's room # [phone] at 12:44 pm and nephew at 14:38pm   Freedom of Choice: Yes  Comments - Freedom of Choice: pt is active with [facility]  referral was snet & cm called Enr[facility] and  - pt is active [facility]-  CM contacted family/caregiver?: Yes             Contacts  Patient Contacts: Robbie Richmond  & [facility] caregivers  Relationship to Patient:: Family  Contact Method: Phone  Phone Number: [phone](nephew)  LM to call to discuss d/c plan   -[phone]  ( [facility]) cm was told Fay the caregiver will be there at 11 am as per [facility] on call team  Reason/Outcome: Discharge Planning    Requested Home Health Care         Is the patient interested in HHC at discharge?: No    DME Referral Provided  Referral made for DME?: No    Other Referral/Resources/Interventions Provided:  Interventions: HHA, Outpatient ST, Outpatient PT, Outpatient OT  Referral Comments: pt is active with [facility]-  pt has a caregiver for 12hrs per day for 7 days/week- pt is also set up with [employer] she states she has an appointment set up for thursday- medical necessity is  in  the pt's bin    Would you like to participate in our [facility] service program?  : No - Declined    Treatment Team Recommendation: Home (home wiht caregivers & [facility] & outpt follow up- 11 am CRYSTAL Rollins)  Discharge Destination Plan:: Home (home with caregivers & [facility] & outpt follow up- 11  am CRYST[facility] Rollins)  Transport at Discharge : [facility]  Dispatcher Contacted: Yes  Number/Name of Dispatcher: [phone]  Transported by (Company and Unit #): [employer]  ETA of Transport (Date): 05/27/24  ETA of Transport (Time): 1100                    Family notified:: message was left for her nephew

## 2024-05-26 NOTE — ASSESSMENT & PLAN NOTE
Patient BMI 73.  Patient is bedbound and does not ambulate at home  Clotrimazole 1% cream bid to affected areas for intertrigo   Bariatric bed ordered

## 2024-05-26 NOTE — PROGRESS NOTES
Counts include 234 beds at the [facility]   PROGRESS NOTE- Zee Kirk, 1950, 73 y.o. female MRN: [id number]   Unit/Bed#: /-01 Encounter: [id number]   Primary Care Physician: Franklyn Caruso MD   Date and Time Admitted to Hospital: 5/23/2024  6:15 AM     Assessment/Plan:   * Acute on chronic diastolic congestive heart failure (HCC)  Assessment & Plan  Wt Readings from Last 3 Encounters:   05/26/24 (!) 196 kg (431 lb 14.1 oz)   03/05/24 (!) 186 kg (411 lb)   01/19/24 (!) 187 kg (411 lb 13.1 oz)       Intake/Output Summary (Last 24 hours) at 5/26/2024 1135  Last data filed at 5/26/2024 0900  Gross per 24 hour   Intake 360 ml   Output 500 ml   Net -140 ml     POA: Patient presented with worsening dyspnea and orthopnea. BNP slightly elevated on admission. Chest x-ray wet read with mild vascular congestion otherwise without acute abnormality.   Echo from January 2024 with EF of 55% and diastolic dysfunction.  Patient states that she has been taking Lasix 60 mg bid at home and monitoring her fluid intake.  Clinically improved and stable on RA. Although still reports intermittent SOB and mild bilateral crackles noted on exam.    Plan:   Monitor I/O and daily wt   Will continue Lasix 40 mg IV daily and give additional Lasix 40 mg IV x 1 today  Likely can transition to p.o. Lasix tomorrow   CM working on setting up home caregiver and transport - tentative plan for discharge tomorrow if remains medically stable     Acute respiratory failure with hypoxia (HCC)-resolved as of 5/26/2024  Assessment & Plan  Likely multifactorial secondary to fluid overload, obesity hypoventilation, undiagnosed sleep apnea  Continue oxygen, titrate as tolerated  Treatment for suspected CHF exacerbation as above  Respiratory protocol  Chest x-ray within normal months  Resolved and stable on RA - will need outpatient sleep study on discharge     Longstanding persistent atrial fibrillation (HCC)  Assessment & Plan  POA:  Longstanding  Afib with VR Controlled.  JOHNNIE?DS?-VASc 3  Rate control: None  Anticoagulation: Warfarin 5 mg daily, outpatient Cardiology previously discussed with patient about switching to DOAC but patient was reluctant to switch and currently still declines although appears that she is not consistently monitoring INR as outpatient.     Plan:  Will increase warfarin to 7.5 mg d/t subtherapeutic INR  Will need outpatient INR monitoring, mobile labs per CM   Outpatient Cardiology f/u    Subtherapeutic international normalized ratio (INR)  Assessment & Plan  INR 1.67 subtherapeutic, was on warfarin 4 mg daily as outpatient and has been receiving 5 mg daily inpatient.  Will increase warfarin from 5 mg ot 7.5 mg and f/u INR in a.m., goal 2-3    Multiple open wounds  Assessment & Plan  Patient with multiple chronic wounds  Wound care team consulted  Continue local wound care as needed  Patient did have some increased erythema/weeping of right thigh and right abdominal panniculus.  WBC trending down and remains afebrile.   Continue doxycycline 100 mg bid d/t multiple allergies to antibiotics, day 2/5    Class 3 severe obesity due to excess calories with serious comorbidity and body mass index (BMI) greater than or equal to 70 in adult (HCC)  Assessment & Plan  Patient BMI 73.  Patient is bedbound and does not ambulate at home  Clotrimazole 1% cream bid to affected areas for intertrigo   Continue supportive care  Bariatric bed ordered         VTE Pharmacologic Prophylaxis: VTE Score: 5 High Risk (Score >/= 5) - Pharmacological DVT Prophylaxis Ordered: warfarin (Coumadin). Sequential Compression Devices Ordered.    Patient Centered Rounds:  Performed bedside rounds with nursing staff.   Discussions with Specialists or Other Care Team Provider: None  Education and Discussions with Family / Patient: Patient declined call to .     Current Length of Stay: 3 day(s)  Current Patient Status: Inpatient   Discharge  Plan: Anticipate discharge tomorrow to home with home services.    Code Status: Level 1 - Full Code    Subjective:   Patient was seen and examined at bedside this a.m. no acute distress. She reports still having intermittent SOB although somewhat improved since admission. Denies fever, chills, n/v, abdominal pain, or urinary symptoms. She is otherwise hemodynamically stable with no significant events overnight and no new concerns at this time.    Objective:     Vitals:   Temp (24hrs), Av.7 °F (36.5 °C), Min:97.2 °F (36.2 °C), Max:98.1 °F (36.7 °C)    Temp:  [97.2 °F (36.2 °C)-98.1 °F (36.7 °C)] 97.2 °F (36.2 °C)  HR:  [3-64] 3  Resp:  [16-20] 16  BP: (108-122)/(50-63) 122/60  SpO2:  [92 %-95 %] 95 %  Body mass index is 74.13 kg/m².     Input and Output Summary (last 24 hours):     Intake/Output Summary (Last 24 hours) at 2024 1103  Last data filed at 2024 0900  Gross per 24 hour   Intake 360 ml   Output 500 ml   Net -140 ml       Physical Exam  Constitutional:       General: She is not in acute distress.     Appearance: She is obese. She is not toxic-appearing.   HENT:      Head: Normocephalic and atraumatic.   Cardiovascular:      Rate and Rhythm: Normal rate. Rhythm irregular.      Heart sounds: Normal heart sounds.   Pulmonary:      Effort: No respiratory distress.      Breath sounds: Rales present. No wheezing or rhonchi.   Abdominal:      General: Abdomen is protuberant. There is no distension.      Palpations: Abdomen is soft.      Tenderness: There is no abdominal tenderness.      Comments: Right abdominal panniculus wound with malodorous, purulent drainage   Musculoskeletal:      Cervical back: Neck supple.      Right lower leg: Edema present.      Left lower leg: Edema present.   Skin:     General: Skin is warm and dry.      Findings: Erythema and rash present. No lesion.   Neurological:      General: No focal deficit present.      Mental Status: She is alert and oriented to person, place, and  time.        Additional Data:     Labs:  Results from last 7 days   Lab Units 05/26/24  0525   WBC Thousand/uL 12.25*   HEMOGLOBIN g/dL 10.6*   HEMATOCRIT % 36.8   PLATELETS Thousands/uL 380   SEGS PCT % 83*   LYMPHO PCT % 9*   MONO PCT % 5   EOS PCT % 2     Results from last 7 days   Lab Units 05/26/24  0525 05/24/24  0525 05/23/24  0627   SODIUM mmol/L 135   < > 140   POTASSIUM mmol/L 4.1   < > 4.2   CHLORIDE mmol/L 99   < > 102   CO2 mmol/L 31   < > 33*   BUN mg/dL 38*   < > 34*   CREATININE mg/dL 1.16   < > 1.21   ANION GAP mmol/L 5   < > 5   CALCIUM mg/dL 8.4   < > 8.8   ALBUMIN g/dL  --   --  3.1*   TOTAL BILIRUBIN mg/dL  --   --  0.64   ALK PHOS U/L  --   --  94   ALT U/L  --   --  11   AST U/L  --   --  13   GLUCOSE RANDOM mg/dL 112   < > 119    < > = values in this interval not displayed.     Results from last 7 days   Lab Units 05/26/24  0525   INR  1.67*                   Lines/Drains:  Invasive Devices       Peripheral Intravenous Line  Duration             Peripheral IV 05/23/24 Right Antecubital 3 days              Drain  Duration             External Urinary Catheter 2 days                        Imaging: Reviewed pertinent radiology reports from this admission.   Recent Cultures (last 7 days):         Last 24 Hours Medication List:   Current Facility-Administered Medications   Medication Dose Route Frequency Provider Last Rate    acetaminophen  650 mg Oral Q6H PRN Yane Verdugo MD      allopurinol  100 mg Oral Daily Yane Verdugo MD      doxycycline hyclate  100 mg Oral Q12H TEODORO Yane Verdugo MD      DULoxetine  20 mg Oral Daily Yane Verdugo MD      furosemide  40 mg Intravenous Daily Yane Verdugo MD      gabapentin  100 mg Oral TID Yane Verdugo MD      iron polysaccharides  150 mg Oral Daily Yane Verdugo MD      levothyroxine  137 mcg Oral Early Morning Yane Verdugo MD      midodrine  2.5 mg Oral TID  Yane  Alaina Verdugo MD      nystatin   Topical BID Yane Verdugo MD      ondansetron  4 mg Intravenous Q6H PRN Yane Verdugo MD      pantoprazole  40 mg Oral Early Morning Yane Verdugo MD      sodium chloride (PF)  3 mL Intravenous Q1H PRN Yane Verdugo MD      warfarin  7.5 mg Oral Daily (warfarin) Darlyn Pettit MD          Today, Patient Was Seen By: Daryln Pettit MD    **Please Note: This note may have been constructed using a voice recognition system.**

## 2024-05-26 NOTE — ASSESSMENT & PLAN NOTE
JOHNNIE?DS?-VASc 3  Increase warfarin to 7.5 mg d/t subtherapeutic INR  Will need outpatient INR monitoring and Cardiology follow-up

## 2024-05-26 NOTE — ASSESSMENT & PLAN NOTE
Likely multifactorial secondary to fluid overload, obesity hypoventilation, undiagnosed sleep apnea  Continue oxygen, titrate as tolerated  Treatment for suspected CHF exacerbation as above  Respiratory protocol  Chest x-ray within normal months  Resolved and stable on RA - will need outpatient sleep study on discharge

## 2024-05-26 NOTE — ASSESSMENT & PLAN NOTE
Wt Readings from Last 3 Encounters:   05/26/24 (!) 196 kg (431 lb 14.1 oz)   03/05/24 (!) 186 kg (411 lb)   01/19/24 (!) 187 kg (411 lb 13.1 oz)       Intake/Output Summary (Last 24 hours) at 5/26/2024 1135  Last data filed at 5/26/2024 0900  Gross per 24 hour   Intake 360 ml   Output 500 ml   Net -140 ml     Patient presented with worsening dyspnea and orthopnea.  Patient continues to complain of shortness of breath and intermittently on supplemental oxygen   Echo from January 2024 with EF of 55% and diastolic dysfunction.  Monitor I/O and daily wt   Will continue Lasix 40 mg IV daily

## 2024-05-26 NOTE — PLAN OF CARE
Problem: RESPIRATORY - ADULT  Goal: Achieves optimal ventilation and oxygenation  Description: INTERVENTIONS:  - Assess for changes in respiratory status  - Assess for changes in mentation and behavior  - Position to facilitate oxygenation and minimize respiratory effort  - Oxygen administered by appropriate delivery if ordered  - Initiate smoking cessation education as indicated  - Encourage broncho-pulmonary hygiene including cough, deep breathe, Incentive Spirometry  - Assess the need for suctioning and aspirate as needed  - Assess and instruct to report SOB or any respiratory difficulty  - Respiratory Therapy support as indicated  5/26/2024 1330 by Valentina Huff RN  Outcome: Progressing  5/26/2024 1330 by Valentina Huff, RN  Outcome: Progressing

## 2024-05-26 NOTE — CASE MANAGEMENT
Case Management Progress Note    Patient name Zee Kirk  Location /-01 MRN [id number]  : 1950 Date 2024       LOS (days): 3  Geometric Mean LOS (GMLOS) (days): 3  Days to GMLOS:0        OBJECTIVE:        Current admission status: Inpatient  Preferred Pharmacy:   [facility] [place] [id number] [place] [address]  Phone: [phone] Fax: [phone]    Primary Care Provider: Franklyn Caruso MD    Primary Insurance: MEDICARE  Secondary Insurance: AARP    PROGRESS NOTE:  Cm called [facility] at 9:59 am # [phone]  - cm needs to get caregivers in place before she is able to be d/c and set up BLS transport .

## 2024-05-27 LAB
ANION GAP SERPL CALCULATED.3IONS-SCNC: 6 MMOL/L (ref 4–13)
BASOPHILS # BLD AUTO: 0.03 THOUSANDS/ÂΜL (ref 0–0.1)
BASOPHILS NFR BLD AUTO: 0 % (ref 0–1)
BUN SERPL-MCNC: 36 MG/DL (ref 5–25)
CALCIUM SERPL-MCNC: 8.4 MG/DL (ref 8.4–10.2)
CHLORIDE SERPL-SCNC: 100 MMOL/L (ref 96–108)
CO2 SERPL-SCNC: 32 MMOL/L (ref 21–32)
CREAT SERPL-MCNC: 1.03 MG/DL (ref 0.6–1.3)
EOSINOPHIL # BLD AUTO: 0.28 THOUSAND/ÂΜL (ref 0–0.61)
EOSINOPHIL NFR BLD AUTO: 2 % (ref 0–6)
ERYTHROCYTE [DISTWIDTH] IN BLOOD BY AUTOMATED COUNT: 16.2 % (ref 11.6–15.1)
GFR SERPL CREATININE-BSD FRML MDRD: 54 ML/MIN/1.73SQ M
GLUCOSE SERPL-MCNC: 114 MG/DL (ref 65–140)
HCT VFR BLD AUTO: 35.5 % (ref 34.8–46.1)
HGB BLD-MCNC: 10.3 G/DL (ref 11.5–15.4)
IMM GRANULOCYTES # BLD AUTO: 0.04 THOUSAND/UL (ref 0–0.2)
IMM GRANULOCYTES NFR BLD AUTO: 0 % (ref 0–2)
INR PPP: 1.95 (ref 0.84–1.19)
LYMPHOCYTES # BLD AUTO: 1.14 THOUSANDS/ÂΜL (ref 0.6–4.47)
LYMPHOCYTES NFR BLD AUTO: 9 % (ref 14–44)
MAGNESIUM SERPL-MCNC: 1.9 MG/DL (ref 1.9–2.7)
MCH RBC QN AUTO: 25.5 PG (ref 26.8–34.3)
MCHC RBC AUTO-ENTMCNC: 29 G/DL (ref 31.4–37.4)
MCV RBC AUTO: 88 FL (ref 82–98)
MONOCYTES # BLD AUTO: 0.52 THOUSAND/ÂΜL (ref 0.17–1.22)
MONOCYTES NFR BLD AUTO: 4 % (ref 4–12)
NEUTROPHILS # BLD AUTO: 10.39 THOUSANDS/ÂΜL (ref 1.85–7.62)
NEUTS SEG NFR BLD AUTO: 85 % (ref 43–75)
NRBC BLD AUTO-RTO: 0 /100 WBCS
PLATELET # BLD AUTO: 387 THOUSANDS/UL (ref 149–390)
PMV BLD AUTO: 8.7 FL (ref 8.9–12.7)
POTASSIUM SERPL-SCNC: 3.7 MMOL/L (ref 3.5–5.3)
PROTHROMBIN TIME: 22.2 SECONDS (ref 11.6–14.5)
RBC # BLD AUTO: 4.04 MILLION/UL (ref 3.81–5.12)
SODIUM SERPL-SCNC: 138 MMOL/L (ref 135–147)
WBC # BLD AUTO: 12.4 THOUSAND/UL (ref 4.31–10.16)

## 2024-05-27 PROCEDURE — 85025 COMPLETE CBC W/AUTO DIFF WBC: CPT

## 2024-05-27 PROCEDURE — 99232 SBSQ HOSP IP/OBS MODERATE 35: CPT | Performed by: INTERNAL MEDICINE

## 2024-05-27 PROCEDURE — 80048 BASIC METABOLIC PNL TOTAL CA: CPT

## 2024-05-27 PROCEDURE — 83735 ASSAY OF MAGNESIUM: CPT

## 2024-05-27 PROCEDURE — 85610 PROTHROMBIN TIME: CPT

## 2024-05-27 RX ORDER — POLYETHYLENE GLYCOL 3350 17 G/17G
17 POWDER, FOR SOLUTION ORAL DAILY PRN
Status: DISCONTINUED | OUTPATIENT
Start: 2024-05-27 | End: 2024-05-28 | Stop reason: HOSPADM

## 2024-05-27 RX ADMIN — DOXYCYCLINE 100 MG: 100 CAPSULE ORAL at 08:59

## 2024-05-27 RX ADMIN — GABAPENTIN 100 MG: 100 CAPSULE ORAL at 15:56

## 2024-05-27 RX ADMIN — GABAPENTIN 100 MG: 100 CAPSULE ORAL at 21:11

## 2024-05-27 RX ADMIN — WARFARIN SODIUM 7.5 MG: 7.5 TABLET ORAL at 17:43

## 2024-05-27 RX ADMIN — CLOTRIMAZOLE: 1 CREAM TOPICAL at 09:03

## 2024-05-27 RX ADMIN — MIDODRINE HYDROCHLORIDE 2.5 MG: 5 TABLET ORAL at 15:56

## 2024-05-27 RX ADMIN — CLOTRIMAZOLE: 1 CREAM TOPICAL at 17:44

## 2024-05-27 RX ADMIN — DULOXETINE HYDROCHLORIDE 20 MG: 20 CAPSULE, DELAYED RELEASE ORAL at 08:59

## 2024-05-27 RX ADMIN — LEVOTHYROXINE SODIUM 137 MCG: 25 TABLET ORAL at 06:14

## 2024-05-27 RX ADMIN — FUROSEMIDE 40 MG: 10 INJECTION, SOLUTION INTRAMUSCULAR; INTRAVENOUS at 08:59

## 2024-05-27 RX ADMIN — GABAPENTIN 100 MG: 100 CAPSULE ORAL at 08:59

## 2024-05-27 RX ADMIN — DOXYCYCLINE 100 MG: 100 CAPSULE ORAL at 21:11

## 2024-05-27 RX ADMIN — MIDODRINE HYDROCHLORIDE 2.5 MG: 5 TABLET ORAL at 11:39

## 2024-05-27 RX ADMIN — POLYETHYLENE GLYCOL 3350 17 G: 17 POWDER, FOR SOLUTION ORAL at 15:57

## 2024-05-27 RX ADMIN — MIDODRINE HYDROCHLORIDE 2.5 MG: 5 TABLET ORAL at 06:14

## 2024-05-27 RX ADMIN — ALLOPURINOL 100 MG: 100 TABLET ORAL at 08:59

## 2024-05-27 NOTE — PLAN OF CARE
Problem: Prexisting or High Potential for Compromised Skin Integrity  Goal: Skin integrity is maintained or improved  Description: INTERVENTIONS:  - Identify patients at risk for skin breakdown  - Assess and monitor skin integrity  - Assess and monitor nutrition and hydration status  - Monitor labs   - Assess for incontinence   - Turn and reposition patient  - Assist with mobility/ambulation  - Relieve pressure over bony prominences  - Avoid friction and shearing  - Provide appropriate hygiene as needed including keeping skin clean and dry  - Evaluate need for skin moisturizer/barrier cream  - Collaborate with interdisciplinary team   - Patient/family teaching  - Consider wound care consult   Wound care as ordered  Turn and reposition q2h  Outcome: Progressing

## 2024-05-27 NOTE — PLAN OF CARE
Problem: Potential for Falls  Goal: Patient will remain free of falls  Description: INTERVENTIONS:  - Educate patient/family on patient safety including physical limitations  - Instruct patient to call for assistance with activity   - Consult OT/PT to assist with strengthening/mobility   - Keep Call bell within reach  - Keep bed low and locked with side rails adjusted as appropriate  - Keep care items and personal belongings within reach  - Initiate and maintain comfort rounds  - Make Fall Risk Sign visible to staff  - Offer Toileting every 2 Hours, in advance of need  - Initiate/Maintain bed alarm  - Obtain necessary fall risk management equipment:  socks  Problem: PAIN - ADULT  Goal: Verbalizes/displays adequate comfort level or baseline comfort level  Description: Interventions:  - Encourage patient to monitor pain and request assistance  - Assess pain using appropriate pain scale  - Administer analgesics based on type and severity of pain and evaluate response  - Implement non-pharmacological measures as appropriate and evaluate response  - Consider cultural and social influences on pain and pain management  - Notify physician/advanced practitioner if interventions unsuccessful or patient reports new pain  Outcome: Progressing     Problem: DISCHARGE PLANNING  Goal: Discharge to home or other facility with appropriate resources  Description: INTERVENTIONS:  - Identify barriers to discharge w/patient and caregiver  - Arrange for needed discharge resources and transportation as appropriate  - Identify discharge learning needs (meds, wound care, etc.)  - Arrange for interpretive services to assist at discharge as needed  - Refer to Case Management Department for coordinating discharge planning if the patient needs post-hospital services based on physician/advanced practitioner order or complex needs related to functional status, cognitive ability, or social support system  Outcome: Progressing     - Apply  yellow socks and bracelet for high fall risk patients  - Consider moving patient to room near nurses station  Outcome: Progressing

## 2024-05-27 NOTE — CASE MANAGEMENT
Case Management Discharge Planning Note    Patient name Zee Kirk  Location /-01 MRN [id number]  : 1950 Date 2024       Current Admission Date: 2024  Current Admission Diagnosis:Acute on chronic diastolic congestive heart failure (HCC)   Patient Active Problem List    Diagnosis Date Noted Date Diagnosed    Subtherapeutic international normalized ratio (INR) 2024     Multiple open wounds 2024     Chest discomfort 2024     Left leg pain 2023     Shingles 2023     Cellulitis- Ruled Out 2023     Longstanding persistent atrial fibrillation (HCC) 2022     History of Clostridioides difficile infection 2022     Lymphedema of extremity 2021     Other specified hypothyroidism 10/03/2020     Mild episode of recurrent major depressive disorder (HCC) 10/03/2020     Idiopathic chronic 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 10/03/2020     Primary osteoarthritis involving multiple joints 10/03/2020     Class 3 severe obesity due to excess calories with serious comorbidity and body mass index (BMI) greater than or equal to 70 in adult ([facility]) 10/03/2020     Acute on chronic diastolic congestive heart failure (HCC) 10/03/2020     Panniculitis 10/03/2020     Gastroesophageal reflux disease without esophagitis 10/03/2020     Hx MRSA infection 2020     Impaired mobility 2019     Osteoarthritis of glenohumeral joint 2019     Left ovarian cyst 2017     Depression with anxiety 07/15/2017     Anemia 2016     Ambulatory dysfunction 2016     Adjustment disorder 2013     Irritable bowel syndrome 2012     Left atrial enlargement 2012     Left ventricular hypertrophy 2012     Carpal tunnel syndrome 2012     Gout 2012     Hyperlipidemia 2012     Symptomatic menopausal or female climacteric states 2012       LOS (days): 4  Geometric Mean LOS (GMLOS) (days): 3  Days to  GMLOS:-1.1     OBJECTIVE:  Risk of Unplanned Readmission Score: 24.67         Current admission status: Inpatient   Preferred Pharmacy:   [facility] [place] - [address]  [address]  [place] [address]  Phone: [phone] Fax: [phone]    Primary Care Provider: Franklyn Caruso MD    Primary Insurance: MEDICARE  Secondary Insurance: AARP    DISCHARGE DETAILS:  Received  TT from Valentina SHEPPARD to cancel the S transport.  [facility] has cancelled fhe  DC.  TC to [place] [facility], spoke to [facility], cancelled transport.  Cancelled in roundtrip.

## 2024-05-28 VITALS
DIASTOLIC BLOOD PRESSURE: 50 MMHG | TEMPERATURE: 97.6 F | HEART RATE: 64 BPM | BODY MASS INDEX: 50.02 KG/M2 | RESPIRATION RATE: 16 BRPM | OXYGEN SATURATION: 99 % | SYSTOLIC BLOOD PRESSURE: 92 MMHG | WEIGHT: 293 LBS | HEIGHT: 64 IN

## 2024-05-28 LAB
ANION GAP SERPL CALCULATED.3IONS-SCNC: 5 MMOL/L (ref 4–13)
BUN SERPL-MCNC: 34 MG/DL (ref 5–25)
CALCIUM SERPL-MCNC: 8.8 MG/DL (ref 8.4–10.2)
CHLORIDE SERPL-SCNC: 97 MMOL/L (ref 96–108)
CO2 SERPL-SCNC: 36 MMOL/L (ref 21–32)
CREAT SERPL-MCNC: 1.16 MG/DL (ref 0.6–1.3)
ERYTHROCYTE [DISTWIDTH] IN BLOOD BY AUTOMATED COUNT: 16.1 % (ref 11.6–15.1)
GFR SERPL CREATININE-BSD FRML MDRD: 47 ML/MIN/1.73SQ M
GLUCOSE SERPL-MCNC: 105 MG/DL (ref 65–140)
HCT VFR BLD AUTO: 38 % (ref 34.8–46.1)
HGB BLD-MCNC: 10.8 G/DL (ref 11.5–15.4)
INR PPP: 2.48 (ref 0.84–1.19)
MAGNESIUM SERPL-MCNC: 1.8 MG/DL (ref 1.9–2.7)
MCH RBC QN AUTO: 24.9 PG (ref 26.8–34.3)
MCHC RBC AUTO-ENTMCNC: 28.4 G/DL (ref 31.4–37.4)
MCV RBC AUTO: 88 FL (ref 82–98)
PLATELET # BLD AUTO: 415 THOUSANDS/UL (ref 149–390)
PMV BLD AUTO: 8.8 FL (ref 8.9–12.7)
POTASSIUM SERPL-SCNC: 4 MMOL/L (ref 3.5–5.3)
PROTHROMBIN TIME: 26.7 SECONDS (ref 11.6–14.5)
RBC # BLD AUTO: 4.33 MILLION/UL (ref 3.81–5.12)
SODIUM SERPL-SCNC: 138 MMOL/L (ref 135–147)
WBC # BLD AUTO: 11.76 THOUSAND/UL (ref 4.31–10.16)

## 2024-05-28 PROCEDURE — 85027 COMPLETE CBC AUTOMATED: CPT | Performed by: INTERNAL MEDICINE

## 2024-05-28 PROCEDURE — 99239 HOSP IP/OBS DSCHRG MGMT >30: CPT | Performed by: INTERNAL MEDICINE

## 2024-05-28 PROCEDURE — 80048 BASIC METABOLIC PNL TOTAL CA: CPT | Performed by: INTERNAL MEDICINE

## 2024-05-28 PROCEDURE — 85610 PROTHROMBIN TIME: CPT | Performed by: INTERNAL MEDICINE

## 2024-05-28 PROCEDURE — 83735 ASSAY OF MAGNESIUM: CPT | Performed by: INTERNAL MEDICINE

## 2024-05-28 RX ORDER — WARFARIN SODIUM 5 MG/1
5 TABLET ORAL
Qty: 30 TABLET | Refills: 0 | Status: SHIPPED | OUTPATIENT
Start: 2024-05-28 | End: 2024-06-27

## 2024-05-28 RX ORDER — MAGNESIUM SULFATE HEPTAHYDRATE 40 MG/ML
2 INJECTION, SOLUTION INTRAVENOUS ONCE
Status: COMPLETED | OUTPATIENT
Start: 2024-05-28 | End: 2024-05-28

## 2024-05-28 RX ORDER — DOXYCYCLINE HYCLATE 100 MG/1
100 CAPSULE ORAL EVERY 12 HOURS SCHEDULED
Qty: 4 CAPSULE | Refills: 0 | Status: SHIPPED | OUTPATIENT
Start: 2024-05-28 | End: 2024-05-30

## 2024-05-28 RX ORDER — FUROSEMIDE 40 MG/1
60 TABLET ORAL 2 TIMES DAILY
Qty: 90 TABLET | Refills: 0 | Status: SHIPPED | OUTPATIENT
Start: 2024-05-28 | End: 2024-06-04

## 2024-05-28 RX ADMIN — MIDODRINE HYDROCHLORIDE 2.5 MG: 5 TABLET ORAL at 12:18

## 2024-05-28 RX ADMIN — MAGNESIUM SULFATE HEPTAHYDRATE 2 G: 40 INJECTION, SOLUTION INTRAVENOUS at 08:55

## 2024-05-28 RX ADMIN — CLOTRIMAZOLE 1 APPLICATION: 1 CREAM TOPICAL at 08:55

## 2024-05-28 RX ADMIN — LEVOTHYROXINE SODIUM 137 MCG: 25 TABLET ORAL at 05:10

## 2024-05-28 RX ADMIN — DULOXETINE HYDROCHLORIDE 20 MG: 20 CAPSULE, DELAYED RELEASE ORAL at 08:55

## 2024-05-28 RX ADMIN — DOXYCYCLINE 100 MG: 100 CAPSULE ORAL at 08:54

## 2024-05-28 RX ADMIN — ALLOPURINOL 100 MG: 100 TABLET ORAL at 08:54

## 2024-05-28 RX ADMIN — GABAPENTIN 100 MG: 100 CAPSULE ORAL at 08:54

## 2024-05-28 RX ADMIN — MIDODRINE HYDROCHLORIDE 2.5 MG: 5 TABLET ORAL at 06:22

## 2024-05-28 NOTE — DISCHARGE SUMMARY
[facility]  Discharge- Zee Kirk 1950, 73 y.o. female MRN: [id number]  Unit/Bed#: -01 Encounter: [id number]  Primary Care Provider: Franklyn Caruso MD   Date and time admitted to hospital: 5/23/2024  6:15 AM    * Acute on chronic diastolic congestive heart failure (HCC)  Assessment & Plan  Wt Readings from Last 3 Encounters:   05/28/24 (!) 193 kg (425 lb 11.3 oz)   03/05/24 (!) 186 kg (411 lb)   01/19/24 (!) 187 kg (411 lb 13.1 oz)       Intake/Output Summary (Last 24 hours) at 5/28/2024 1153  Last data filed at 5/28/2024 0713  Gross per 24 hour   Intake 640 ml   Output 1050 ml   Net -410 ml       Patient presented with worsening dyspnea and orthopnea.  TTE (January 2024): LVEF of 55% and diastolic dysfunction.  Discharge home on her previous Lasix dosing of 60mg BID    Multiple open wounds  Assessment & Plan  Patient with multiple chronic wounds; increased erythema/weeping of right thigh and right abdominal panniculus  Continue Doxycycline 100 mg BID to complete 5 day course    Subtherapeutic international normalized ratio (INR)  Assessment & Plan  INR 2.48 today  Discharge home with Warfarin 5mg daily  Continue routine outpatient monitoring with goal INR between 2-3    Longstanding persistent atrial fibrillation (HCC)  Assessment & Plan  JOHNNIE?DS?-VASc 3  Continue Warfarin 5mg daily  Will need outpatient INR monitoring and Cardiology follow-up    Class 3 severe obesity due to excess calories with serious comorbidity and body mass index (BMI) greater than or equal to 70 in adult (HCC)  Assessment & Plan  Patient BMI 73.  Patient is bedbound and does not ambulate at home  Clotrimazole 1% cream bid to affected areas for intertrigo       Medical Problems       Resolved Problems  Date Reviewed: 1/19/2024            Resolved    Acute respiratory failure with hypoxia ([facility]) 5/26/2024     Resolved by  Darlyn Pettit MD        Discharging Physician / Practitioner: Amaris  Jennie Caon DO  PCP: Franklyn Caruso MD  Admission Date:   Admission Orders (From admission, onward)       Ordered        05/23/24 0857  INPATIENT ADMISSION  Once                          Discharge Date: 05/28/24    Consultations During Hospital Stay:  Case management    Procedures Performed:   None    Significant Findings / Test Results:   CXR (5/23): No acute cardiopulmonary disease.     Incidental Findings:   None     Test Results Pending at Discharge (will require follow up):   None     Outpatient Tests Requested:  Will need routine PT/INR monitoring    Complications:  None    Reason for Admission: Acute decompensated heart failure    Hospital Course:   Zee Kirk is a 73 y.o. female patient who originally presented to the hospital on 5/23/2024 due to shortness of breath. Please see H&P as documented by Dr. Verdugo complete details regarding history of presenting illness.  In brief, the patient has a past medical history of chronic atrial fibrillation, morbid obesity, and chronic diastolic heart failure who presents with a complaint of shortness of breath worsening over the last few days.  Patient noted compliance with her home Lasix but endorsed noncompliance with strict fluid and sodium restriction.  In the emergency department she had an oxygen saturation as low as 70 on room air and was admitted to the medical floor for IV diuresis and supplemental oxygen.  Patient responded very well to IV diuresis and was ultimately discharged home once weaned from supplemental oxygen.  She was advised on strict sodium and salt restriction.  Additionally, she had increased erythema and drainage from her panniculus and was initiated on doxycycline to complete a 5-day course.  She was ultimately discharged home to return to her previous living environment with previous caregiver.  She was discharged in stable condition and all of her questions were answered prior to departure.  This is a brief discharge  "summary please see full medical record for more details.    Please see above list of diagnoses and related plan for additional information.     Condition at Discharge: stable    Discharge Day Visit / Exam:   Subjective: Patient resting in bed without any acute complaints.  She is no longer on supplemental oxygen.  No significant overnight events reported by nursing.    Vitals: Blood Pressure: 92/50 (05/28/24 0853)  Pulse: 64 (05/27/24 2330)  Temperature: 97.6 °F (36.4 °C) (05/28/24 0713)  Temp Source: Oral (05/26/24 1519)  Respirations: 16 (05/28/24 0713)  Height: 5' 4\" (162.6 cm) (05/23/24 0617)  Weight - Scale: (!) 193 kg (425 lb 11.3 oz) (05/28/24 0600)  SpO2: 99 % (05/27/24 2330)    Exam:   Physical Exam  Vitals and nursing note reviewed.   Constitutional:       General: She is not in acute distress.     Appearance: She is obese.   Cardiovascular:      Rate and Rhythm: Normal rate. Rhythm irregular.      Pulses: Normal pulses.      Heart sounds: Normal heart sounds.   Pulmonary:      Effort: Pulmonary effort is normal. No respiratory distress.      Breath sounds: Normal breath sounds.   Abdominal:      Comments: Chronic skin changes of bilateral abdominal panniculus with multiple  chronic wounds   Neurological:      Mental Status: She is alert.        Discussion with Family:  Patient updated on plan of care.     Discharge instructions/Information to patient and family:   See after visit summary for information provided to patient and family.      Provisions for Follow-Up Care:  See after visit summary for information related to follow-up care and any pertinent home health orders.      Mobility at time of Discharge:   Basic Mobility Inpatient Raw Score: 8  JH-HLM Goal: 3: Sit at edge of bed  JH-HLM Achieved: 1: Laying in bed  HLM Goal NOT achieved. Continue to encourage mobility in post discharge setting.     Disposition:   Home with VNA Services (Reminder: Complete face to face encounter)    Planned Readmission: " None     Discharge Statement:  I spent > 35 minutes discharging the patient. This time was spent on the day of discharge. I had direct contact with the patient on the day of discharge. Greater than 50% of the total time was spent examining patient, answering all patient questions, arranging and discussing plan of care with patient as well as directly providing post-discharge instructions.  Additional time then spent on discharge activities.    Discharge Medications:  See after visit summary for reconciled discharge medications provided to patient and/or family.      **Please Note: This note may have been constructed using a voice recognition system**

## 2024-05-28 NOTE — ASSESSMENT & PLAN NOTE
Wt Readings from Last 3 Encounters:   05/28/24 (!) 193 kg (425 lb 11.3 oz)   03/05/24 (!) 186 kg (411 lb)   01/19/24 (!) 187 kg (411 lb 13.1 oz)       Intake/Output Summary (Last 24 hours) at 5/28/2024 1153  Last data filed at 5/28/2024 0713  Gross per 24 hour   Intake 640 ml   Output 1050 ml   Net -410 ml       Patient presented with worsening dyspnea and orthopnea.  TTE (January 2024): LVEF of 55% and diastolic dysfunction.  Discharge home on her previous Lasix dosing of 60mg BID

## 2024-05-28 NOTE — PLAN OF CARE
Problem: Potential for Falls  Goal: Patient will remain free of falls  Description: INTERVENTIONS:  - Educate patient/family on patient safety including physical limitations  - Instruct patient to call for assistance with activity   - Consult OT/PT to assist with strengthening/mobility   - Keep Call bell within reach  - Keep bed low and locked with side rails adjusted as appropriate  - Keep care items and personal belongings within reach  - Initiate and maintain comfort rounds  - Make Fall Risk Sign visible to staff  - Offer Toileting every  Hours, in advance of need  - Initiate/Maintain alarm  - Obtain necessary fall risk management equipment  - Apply yellow socks and bracelet for high fall risk patients  - Consider moving patient to room near nurses station  5/28/2024 1215 by Nella Hernandez RN  Outcome: Adequate for Discharge  5/28/2024 0925 by Nella Hernandez RN  Outcome: Progressing     Problem: RESPIRATORY - ADULT  Goal: Achieves optimal ventilation and oxygenation  Description: INTERVENTIONS:  - Assess for changes in respiratory status  - Assess for changes in mentation and behavior  - Position to facilitate oxygenation and minimize respiratory effort  - Oxygen administered by appropriate delivery if ordered  - Initiate smoking cessation education as indicated  - Encourage broncho-pulmonary hygiene including cough, deep breathe, Incentive Spirometry  - Assess the need for suctioning and aspirate as needed  - Assess and instruct to report SOB or any respiratory difficulty  - Respiratory Therapy support as indicated  5/28/2024 1215 by Nella Hernandez RN  Outcome: Adequate for Discharge  5/28/2024 0925 by Nella Hernandez RN  Outcome: Progressing     Problem: PAIN - ADULT  Goal: Verbalizes/displays adequate comfort level or baseline comfort level  Description: Interventions:  - Encourage patient to monitor pain and request assistance  - Assess pain using appropriate pain scale  - Administer analgesics based on type  and severity of pain and evaluate response  - Implement non-pharmacological measures as appropriate and evaluate response  - Consider cultural and social influences on pain and pain management  - Notify physician/advanced practitioner if interventions unsuccessful or patient reports new pain  5/28/2024 1215 by Nella Hernandez RN  Outcome: Adequate for Discharge  5/28/2024 0925 by Nella Hernandez RN  Outcome: Progressing     Problem: INFECTION - ADULT  Goal: Absence or prevention of progression during hospitalization  Description: INTERVENTIONS:  - Assess and monitor for signs and symptoms of infection  - Monitor lab/diagnostic results  - Monitor all insertion sites, i.e. indwelling lines, tubes, and drains  - Monitor endotracheal if appropriate and nasal secretions for changes in amount and color  - [place] appropriate cooling/warming therapies per order  - Administer medications as ordered  - Instruct and encourage patient and family to use good hand hygiene technique  - Identify and instruct in appropriate isolation precautions for identified infection/condition  5/28/2024 1215 by Nella Hernandez RN  Outcome: Adequate for Discharge  5/28/2024 0925 by Nella Hernandez RN  Outcome: Progressing  Goal: Absence of fever/infection during neutropenic period  Description: INTERVENTIONS:  - Monitor WBC    5/28/2024 1215 by Nella Hernandez RN  Outcome: Adequate for Discharge  5/28/2024 0925 by Nella Hernandez RN  Outcome: Progressing     Problem: DISCHARGE PLANNING  Goal: Discharge to home or other facility with appropriate resources  Description: INTERVENTIONS:  - Identify barriers to discharge w/patient and caregiver  - Arrange for needed discharge resources and transportation as appropriate  - Identify discharge learning needs (meds, wound care, etc.)  - Arrange for interpretive services to assist at discharge as needed  - Refer to Case Management Department for coordinating discharge planning if the patient needs  post-hospital services based on physician/advanced practitioner order or complex needs related to functional status, cognitive ability, or social support system  5/28/2024 1215 by Nella Hernandez RN  Outcome: Adequate for Discharge  5/28/2024 0925 by Nella Hernandez RN  Outcome: Progressing     Problem: Knowledge Deficit  Goal: Patient/family/caregiver demonstrates understanding of disease process, treatment plan, medications, and discharge instructions  Description: Complete learning assessment and assess knowledge base.  Interventions:  - Provide teaching at level of understanding  - Provide teaching via preferred learning methods  5/28/2024 1215 by Nella Hernandez RN  Outcome: Adequate for Discharge  5/28/2024 0925 by Nella Hernandez RN  Outcome: Progressing     Problem: Prexisting or High Potential for Compromised Skin Integrity  Goal: Skin integrity is maintained or improved  Description: INTERVENTIONS:  - Identify patients at risk for skin breakdown  - Assess and monitor skin integrity  - Assess and monitor nutrition and hydration status  - Monitor labs   - Assess for incontinence   - Turn and reposition patient  - Assist with mobility/ambulation  - Relieve pressure over bony prominences  - Avoid friction and shearing  - Provide appropriate hygiene as needed including keeping skin clean and dry  - Evaluate need for skin moisturizer/barrier cream  - Collaborate with interdisciplinary team   - Patient/family teaching  - Consider wound care consult   5/28/2024 1215 by Nella Hernandez RN  Outcome: Adequate for Discharge  5/28/2024 0925 by Nella Hernandez RN  Outcome: Progressing

## 2024-05-28 NOTE — NURSING NOTE
Patient discharged to home with [facility]. IV removed with catheter tip intact, DSD and pressure applied. All belongings returned to patient upon discharge. Patient verbalized understanding of discharge instructions/medications.

## 2024-05-28 NOTE — PLAN OF CARE
Problem: PAIN - ADULT  Goal: Verbalizes/displays adequate comfort level or baseline comfort level  Description: Interventions:  - Encourage patient to monitor pain and request assistance  - Assess pain using appropriate pain scale  - Administer analgesics based on type and severity of pain and evaluate response  - Implement non-pharmacological measures as appropriate and evaluate response  - Consider cultural and social influences on pain and pain management  - Notify physician/advanced practitioner if interventions unsuccessful or patient reports new pain  Outcome: Progressing     Problem: INFECTION - ADULT  Goal: Absence or prevention of progression during hospitalization  Description: INTERVENTIONS:  - Assess and monitor for signs and symptoms of infection  - Monitor lab/diagnostic results  - Monitor all insertion sites, i.e. indwelling lines, tubes, and drains  - Monitor endotracheal if appropriate and nasal secretions for changes in amount and color  - [place] appropriate cooling/warming therapies per order  - Administer medications as ordered  - Instruct and encourage patient and family to use good hand hygiene technique  - Identify and instruct in appropriate isolation precautions for identified infection/condition  Outcome: Progressing  Goal: Absence of fever/infection during neutropenic period  Description: INTERVENTIONS:  - Monitor WBC    Outcome: Progressing

## 2024-05-28 NOTE — ASSESSMENT & PLAN NOTE
INR 2.48 today  Discharge home with Warfarin 5mg daily  Continue routine outpatient monitoring with goal INR between 2-3   Yes

## 2024-05-28 NOTE — CASE MANAGEMENT
Case Management Discharge Planning Note    Patient name Zee Kirk  Location /-01 MRN [id number]  : 1950 Date 2024       Current Admission Date: 2024  Current Admission Diagnosis:Acute on chronic diastolic congestive heart failure (HCC)   Patient Active Problem List    Diagnosis Date Noted Date Diagnosed    Subtherapeutic international normalized ratio (INR) 2024     Multiple open wounds 2024     Chest discomfort 2024     Left leg pain 2023     Shingles 2023     Cellulitis- Ruled Out 2023     Longstanding persistent atrial fibrillation (HCC) 2022     History of Clostridioides difficile infection 2022     Lymphedema of extremity 2021     Other specified hypothyroidism 10/03/2020     Mild episode of recurrent major depressive disorder (HCC) 10/03/2020     Idiopathic chronic 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 10/03/2020     Primary osteoarthritis involving multiple joints 10/03/2020     Class 3 severe obesity due to excess calories with serious comorbidity and body mass index (BMI) greater than or equal to 70 in adult ([facility]) 10/03/2020     Acute on chronic diastolic congestive heart failure (HCC) 10/03/2020     Panniculitis 10/03/2020     Gastroesophageal reflux disease without esophagitis 10/03/2020     Hx MRSA infection 2020     Impaired mobility 2019     Osteoarthritis of glenohumeral joint 2019     Left ovarian cyst 2017     Depression with anxiety 07/15/2017     Anemia 2016     Ambulatory dysfunction 2016     Adjustment disorder 2013     Irritable bowel syndrome 2012     Left atrial enlargement 2012     Left ventricular hypertrophy 2012     Carpal tunnel syndrome 2012     Gout 2012     Hyperlipidemia 2012     Symptomatic menopausal or female climacteric states 2012       LOS (days): 5  Geometric Mean LOS (GMLOS) (days): 3  Days to  GMLOS:-2.2     OBJECTIVE:  Risk of Unplanned Readmission Score: 25.23         Current admission status: Inpatient   Preferred Pharmacy:   [facility] [place] [facility] - [address]  [address]  [place] [address]  Phone: [phone] Fax: [phone]    Primary Care Provider: Franklyn Caruso MD    Primary Insurance: MEDICARE  Secondary Insurance: AARP    DISCHARGE DETAILS:    Discharge planning discussed with:: patient & CM called [facility] at 9:59 am & 10:05 am  Freedom of Choice: Yes  Comments - Freedom of Choice: pt is active with [facility] pt's choice- pt is active with [facility] pt's choice - pt & family are in agreement with [facility] d/c & d/c plan  CM contacted family/caregiver?: Yes  Were Treatment Team discharge recommendations reviewed with patient/caregiver?: Yes  Did patient/caregiver verbalize understanding of patient care needs?: Yes  Were patient/caregiver advised of the risks associated with not following Treatment Team discharge recommendations?: Yes    Contacts  Patient Contacts: Robbie Richmond  & [place] caregivers  Relationship to Patient:: Other (Comment) (family & eann)  Phone Number: [phone](nephew)     [phone]  ( [facility] caregivers) cm was told Fay the caregiver will be there at 1pm as per Júnior at [facility]  Reason/Outcome: Discharge Planning    Requested Home Health Care         Is the patient interested in HHC at discharge?: No    DME Referral Provided  Referral made for DME?: No    Other Referral/Resources/Interventions Provided:  Interventions: Outpatient OT, Outpatient PT, Outpatient ST  Referral Comments: pt is active with [facility] cm spoke with Eusebio at [facility]- they will resume her orders- pt has an appointmern set up [facility] lab she uses for Thursday- 1pm BLS  - kevin was given [facility] paperwork    Would you like to participate in our [facility] service program?  : No - Declined    Treatment Team Recommendation: Home (home  with Caregivers & [facility] & outpt follow up- BLS 1pm [place])  Discharge Destination Plan:: Home (home with Caregiver & [facility] & outpt follow up- 1pm BLS [place])     Dispatcher Contacted: Yes  Number/Name of Dispatcher: [phone]  Transported by (Company and Unit #): [employer]  ETA of Transport (Date): 05/28/24  ETA of Transport (Time): 1300   IMM reviewed with patient, patient agrees with discharge determination.       Pt & family are in agreement with the d/c & d/c plan     IMM Given (Date):: 05/28/24  IMM Given to:: Patient  Family notified:: family anf caregiver was called

## 2024-05-28 NOTE — ASSESSMENT & PLAN NOTE
Patient with multiple chronic wounds; increased erythema/weeping of right thigh and right abdominal panniculus  Continue Doxycycline 100 mg BID to complete 5 day course

## 2024-05-28 NOTE — ASSESSMENT & PLAN NOTE
JOHNNIE?DS?-VASc 3  Continue Warfarin 5mg daily  Will need outpatient INR monitoring and Cardiology follow-up

## 2024-05-28 NOTE — ASSESSMENT & PLAN NOTE
Patient BMI 73.  Patient is bedbound and does not ambulate at home  Clotrimazole 1% cream bid to affected areas for intertrigo

## 2024-05-28 NOTE — PROGRESS NOTES
Patient:  GIANCARLO SCHMID    MRN:  [id number]    [facility] Request ID:  [id number]    Level of care reserved:    Partner Reserved:    Clinical needs requested:    Geography searched:  [phone]    Start of Service:    Request sent:  1:57pm EDT on 5/26/2024 by Abby Escobar    Partner reserved:  by Abby Escobar    Choice list shared:  4:18pm EDT on 5/28/2024 by Abby Escobar

## 2024-05-29 ENCOUNTER — TELEPHONE (OUTPATIENT)
Dept: SLEEP CENTER | Facility: CLINIC | Age: 74
End: 2024-05-29

## 2024-05-29 DIAGNOSIS — E66.01 CLASS 3 SEVERE OBESITY DUE TO EXCESS CALORIES WITH SERIOUS COMORBIDITY AND BODY MASS INDEX (BMI) GREATER THAN OR EQUAL TO 70 IN ADULT (HCC): Chronic | ICD-10-CM

## 2024-05-29 DIAGNOSIS — G47.30 SLEEP APNEA, UNSPECIFIED TYPE: Primary | ICD-10-CM

## 2024-05-29 DIAGNOSIS — R06.00 DYSPNEA, UNSPECIFIED TYPE: ICD-10-CM

## 2024-05-29 NOTE — TELEPHONE ENCOUNTER
Referral placed for a home sleep study.  Patient has CHF listed as a co-morbidity, which excludes a home study.     Order changed to in-lab diagnostic sleep study.     Ordering and co-signing providers notified.

## 2024-05-30 LAB
APTT PPP: 61.4 SEC (ref 21.6–35.6)
INR PPP: 3.7
PROTHROMBIN TIME: 36.7 SEC (ref 12–14.6)

## 2024-05-31 ENCOUNTER — ANTICOAG VISIT (OUTPATIENT)
Dept: CARDIOLOGY CLINIC | Facility: CLINIC | Age: 74
End: 2024-05-31
Payer: MEDICARE

## 2024-05-31 DIAGNOSIS — Z79.01 LONG TERM (CURRENT) USE OF ANTICOAGULANTS: ICD-10-CM

## 2024-05-31 DIAGNOSIS — I48.11 LONGSTANDING PERSISTENT ATRIAL FIBRILLATION (HCC): Primary | ICD-10-CM

## 2024-05-31 PROCEDURE — 93793 ANTICOAG MGMT PT WARFARIN: CPT | Performed by: PHYSICIAN ASSISTANT

## 2024-05-31 NOTE — PATIENT INSTRUCTIONS
No changes in medication health or diet. No missed or extra doses. No s/s of bleeding TIA or CVA. No new ABX, NSAIDs OTC meds, or supplements. Dose as instructed and recheck in one week.   Jessica Mata PA-C

## 2024-06-04 ENCOUNTER — TELEMEDICINE (OUTPATIENT)
Dept: CARDIOLOGY CLINIC | Facility: CLINIC | Age: 74
End: 2024-06-04
Payer: MEDICARE

## 2024-06-04 VITALS
BODY MASS INDEX: 50.02 KG/M2 | SYSTOLIC BLOOD PRESSURE: 106 MMHG | OXYGEN SATURATION: 94 % | HEART RATE: 66 BPM | WEIGHT: 293 LBS | DIASTOLIC BLOOD PRESSURE: 66 MMHG | HEIGHT: 64 IN

## 2024-06-04 DIAGNOSIS — I48.11 LONGSTANDING PERSISTENT ATRIAL FIBRILLATION (HCC): ICD-10-CM

## 2024-06-04 DIAGNOSIS — I50.32 CHRONIC DIASTOLIC CONGESTIVE HEART FAILURE (HCC): Primary | Chronic | ICD-10-CM

## 2024-06-04 PROCEDURE — 99441 PR PHYS/QHP TELEPHONE EVALUATION 5-10 MIN: CPT | Performed by: INTERNAL MEDICINE

## 2024-06-04 RX ORDER — FUROSEMIDE 40 MG/1
60 TABLET ORAL DAILY
Start: 2024-06-04 | End: 2024-07-04

## 2024-06-04 NOTE — PATIENT INSTRUCTIONS
Continue current medications  2g sodium diet  2L fluid restriction  Daily weights, take additional 60mg of lasix as needed for weight gain of 3 lbs in a day or 5 lbs in 5-7 days.

## 2024-06-04 NOTE — PROGRESS NOTES
Cardiology Outpatient Progress Note - Zee Kirk 73 y.o. female MRN: [id number]    Encounter: [id number]    Telemedicine consent    Patient: Zee Kirk  Provider: Leticia Nagel MD  Provider located at [facility] CARDIOLOGY [address] [address]    The patient was identified by name and date of birth. Zee Kirk was informed that this is a telemedicine visit and that the visit is being conducted through Telephone.  My office door was closed. No one else was in the room.  She acknowledged consent and understanding of privacy and security of the video platform. The patient has agreed to participate and understands they can discontinue the visit at any time.    Patient is aware this is a billable service.     I spent 10 minutes with the patient during this visit.      Leticia Nagel MD 06/04/24      Assessment/Plan:    Patient Active Problem List    Diagnosis Date Noted    Long term (current) use of anticoagulants 05/31/2024    Subtherapeutic international normalized ratio (INR) 05/26/2024    Multiple open wounds 05/24/2024    Chest discomfort 01/18/2024    Left leg pain 09/12/2023    Shingles 08/05/2023    Cellulitis- Ruled Out 08/05/2023    Longstanding persistent atrial fibrillation (HCC) 02/18/2022    History of Clostridioides difficile infection 01/08/2022    Lymphedema of extremity 08/19/2021    Other specified hypothyroidism 10/03/2020    Mild episode of recurrent major depressive disorder (HCC) 10/03/2020    Idiopathic chronic 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 10/03/2020    Primary osteoarthritis involving multiple joints 10/03/2020    Class 3 severe obesity due to excess calories with serious comorbidity and body mass index (BMI) greater than or equal to 70 in adult (HCC) 10/03/2020    Acute on chronic diastolic congestive heart failure (HCC) 10/03/2020    Panniculitis 10/03/2020    Gastroesophageal  reflux disease without esophagitis 10/03/2020    Hx MRSA infection 07/16/2020    Impaired mobility 02/03/2019    Osteoarthritis of glenohumeral joint 02/03/2019    Left ovarian cyst 09/20/2017    Depression with anxiety 07/15/2017    Anemia 12/28/2016    Ambulatory dysfunction 12/27/2016    Adjustment disorder 09/05/2013    Irritable bowel syndrome 12/04/2012    Left atrial enlargement 12/04/2012    Left ventricular hypertrophy 12/04/2012    Carpal tunnel syndrome 05/30/2012    Gout 05/30/2012    Hyperlipidemia 05/30/2012    Symptomatic menopausal or female climacteric states 05/30/2012     # Chronic heart failure with preserved ejection fraction  # Long standing persistent atrial fibrillation  Rate: none  Rhythm: none  AC: warfarin  # Hyperlipidemia  # Morbid obesity, BMI 73    Weight: 425 lbs on discharge 5/28/24; 410 lbs on home scale 6/4/24    Studies- personally reviewed by myself    EKG 5/23/24: afib with normal ventricular rate, low voltage    TTE 1/18/24: LVEF 55%. Mild biatrial enlargement. Mildly increased wall thickness. RV normal size and systolic function. No significant valvular abnormality seen.    Diet:  --2g sodium diet  --2000 ml fluid restriction    Diuretic:  Furosemide 60mg BID - only taking 60mg daily moslty due to hold parameter and weight still trending down    Evidence-based therapy:  --SGLT2 Inhibitor:  --Aldosterone Receptor Blocker:  --ARNi:      TODAY'S PLAN:  Continue current medications  2g sodium diet  2L fluid restriction  Daily weights, take additional 60mg of lasix as needed for weight gain of 3 lbs in a day or 5 lbs in 5-7 days.  She would like to follow up with Dr. Whitehead      HPI:   73 year old female with chronic afib, morbid obesity and HFPEF who was admitted after presenting with shortness of breath and found to be in decompensated heart failure likely due nonadherence with salt and fluid restriction. He was hypoxemic in presentation that subsequently improved with diuresis.  Additionally, she had increased erythema and drainage from her panniculus and was initiated on doxycycline to complete a 5-day course.    Here for telemedicine visit for hospital follow up.    She reports overall doing well since discharge. Home weights trending down. 410  lbs on home scale today. She is bedbound but doing PT/OT twice a week and able to keep up with exercises with no shortness of breath or chest pain.  She has chronic lymphedema. Reports only taking furosemide 60 mg daily and has been missing mostly in the afternoon doses due to hold parameters of systolic blood pressure of 110 mmHg provided by prior cardiologist.  On review patient has only been getting Lasix 40 mg IV daily during hospitalization.      Review of Systems   Constitutional:  Negative for chills and fever.   HENT:  Negative for ear pain and sore throat.    Eyes:  Negative for pain and visual disturbance.   Respiratory:  Negative for cough and shortness of breath.    Cardiovascular:  Positive for leg swelling. Negative for chest pain and palpitations.   Gastrointestinal:  Negative for abdominal pain and vomiting.   Genitourinary:  Negative for dysuria and hematuria.   Musculoskeletal:  Negative for arthralgias and back pain.   Skin:  Negative for color change and rash.   Neurological:  Negative for dizziness, seizures, syncope and light-headedness.   All other systems reviewed and are negative.      Past Medical History:   Diagnosis Date    Atrial fibrillation ([facility])     Bladder stone     C. difficile colitis     Cellulitis     CHF (congestive heart failure) ([facility])     Depression with anxiety     Disease of thyroid gland     Diverticulitis     Enterocolitis     History of abnormal cervical Pap smear     Kidney stone     Lymphedema     Menopause     Age 50    Morbid obesity with BMI of 70 and over, adult ([facility])     MRSA (methicillin resistant Staphylococcus aureus)     abdominal wound    Osteoarthritis     Phlebitis     left lower leg     Spondylolysis          Allergies   Allergen Reactions    Augmentin Es-600 [Amoxicillin-Pot Clavulanate] Anaphylaxis and Rash    Bactrim [Sulfamethoxazole-Trimethoprim] Anaphylaxis    Cefazolin Itching and Other (See Comments)     Patient developed itching and difficulty swallowing following IV cefazolin administration at [facility] 7/19/20 which required treatment with benadryl and epinephrine - has tolerated other cephalosporins with different side chains including cefepime, cefuroxime, and cephalexin    Ciprofloxacin Anaphylaxis    Keflex [Cephalexin] Anaphylaxis    Penicillins Anaphylaxis, Rash and Other (See Comments)     Tolerates cefepime (11/18/21)    Other Rash, Irritability and Edema     Patient reports significant reaction to the use of Max Orb in the abdominal folds leading to swelling, excoriation, maceration and weeping of the skin.     Omeprazole GI Intolerance    Pork-Derived Products - Food Allergy Diarrhea    Sulfa Antibiotics Hives    Latex Rash and Edema    Vitamin C [Ascorbate - Food Allergy] Rash     .    Current Outpatient Medications:     acetaminophen (TYLENOL) 650 mg CR tablet, Take 650 mg by mouth every 8 (eight) hours as needed (for osteoarthritis), Disp: , Rfl:     allopurinol (ZYLOPRIM) 100 mg tablet, Take 1 tablet (100 mg total) by mouth daily, Disp: 30 tablet, Rfl: 5    DULoxetine (CYMBALTA) 20 mg capsule, Take 1 capsule (20 mg total) by mouth daily (Patient taking differently: Take 20 mg by mouth daily Every other day), Disp: 30 capsule, Rfl: 5    furosemide (LASIX) 40 mg tablet, Take 1.5 tablets (60 mg total) by mouth daily, Disp: , Rfl:     gabapentin (NEURONTIN) 100 mg capsule, Take 1 capsule (100 mg total) by mouth 3 (three) times a day, Disp: 90 capsule, Rfl: 0    levothyroxine 125 mcg tablet, Take 137 mcg by mouth daily, Disp: , Rfl:     midodrine (PROAMATINE) 2.5 mg tablet, Take 1 tablet (2.5 mg total) by mouth 3 (three) times a day before meals (Patient taking differently: Take 2.5  mg by mouth 3 (three) times a day as needed), Disp: 90 tablet, Rfl: 0    triamcinolone (KENALOG) 0.5 % cream, Apply 1 Application topically as needed for rash or irritation, Disp: , Rfl:     warfarin (Coumadin) 5 mg tablet, Take 1 tablet (5 mg total) by mouth daily (Patient taking differently: Take 5 mg by mouth daily 2 mg M,W,F 4 mg T,TH, SAT, SUN), Disp: 30 tablet, Rfl: 0    iron polysaccharides (Ferrex 150) 150 mg capsule, Take 150 mg by mouth 2 (two) times a day. Indications: Anemia From Inadequate Iron in the Body (Patient not taking: Reported on 2024), Disp: , Rfl:     nystatin (MYCOSTATIN) powder, Apply topically 2 (two) times a day (Patient not taking: Reported on 2024), Disp: 30 g, Rfl: 0    pantoprazole (PROTONIX) 40 mg tablet, Take 1 tablet (40 mg total) by mouth daily in the early morning (Patient not taking: Reported on 2024), Disp: 30 tablet, Rfl: 0    Social History     Socioeconomic History    Marital status: Single     Spouse name: Not on file    Number of children: Not on file    Years of education: Not on file    Highest education level: Not on file   Occupational History    Not on file   Tobacco Use    Smoking status: Former     Current packs/day: 0.00     Average packs/day: 5.0 packs/day for 3.0 years (15.0 ttl pk-yrs)     Types: Cigarettes     Start date: 1967     Quit date: 1970     Years since quittin.9    Smokeless tobacco: Never    Tobacco comments:     5 packs/day until  (age 17-19) - As per [employer]    Vaping Use    Vaping status: Never Used   Substance and Sexual Activity    Alcohol use: Not Currently    Drug use: Not Currently     Types: Marijuana     Comment: As a teen - As per [employer]     Sexual activity: Not Currently   Other Topics Concern    Not on file   Social History Narrative    Education history: [employer] school    Exercise habits: Walking daily    Nondrinker / no alcohol use - As per [employer]    Spiritism     - As per [employer]   "    Social Determinants of Health     Financial Resource Strain: Not on file   Food Insecurity: No Food Insecurity (5/24/2024)    Hunger Vital Sign     Worried About Running Out of Food in the Last Year: Never true     Ran Out of Food in the Last Year: Never true   Transportation Needs: No Transportation Needs (5/24/2024)    PRAPARE - Transportation     Lack of Transportation (Medical): No     Lack of Transportation (Non-Medical): No   Physical Activity: Not on file   Stress: Not on file   Social Connections: Not on file   Intimate Partner Violence: Not on file   Housing Stability: Low Risk  (5/24/2024)    Housing Stability Vital Sign     Unable to Pay for Housing in the Last Year: No     Number of Times Moved in the Last Year: 1     Homeless in the Last Year: No       Family History   Problem Relation Age of Onset    Heart failure Mother     Heart disease Mother     Lymphoma Mother     Seizures Mother     Kidney disease Father     Heart disease Father     Heart failure Father     Diabetes type II Father     Diabetes type II Sister     Diabetes type II Brother     Uterine cancer Paternal Aunt     Breast cancer Neg Hx     Colon cancer Neg Hx     Ovarian cancer Neg Hx        Physical Exam:    Vitals:   Vitals:    06/04/24 1059   BP: 106/66   Pulse: 66   SpO2: 94%       Physical Exam    Labs & Results:    Lab Results   Component Value Date    SODIUM 138 05/28/2024    K 4.0 05/28/2024    CL 97 05/28/2024    CO2 36 (H) 05/28/2024    BUN 34 (H) 05/28/2024    CREATININE 1.16 05/28/2024    GLUC 105 05/28/2024    CALCIUM 8.8 05/28/2024     Lab Results   Component Value Date    WBC 11.76 (H) 05/28/2024    HGB 10.8 (L) 05/28/2024    HCT 38.0 05/28/2024    MCV 88 05/28/2024     (H) 05/28/2024     No results found for: \"NTBNP\"   No results found for: \"CHOLESTEROL\"  No results found for: \"HDL\"  No results found for: \"TRIG\"  No results found for: \"NONHDLC\"    EKG personally reviewed by Leticia Nagel MD.     Counseling / " Coordination of Care  Time spent today 25 minutes.  Greater than 50% of total time was spent with the patient and / or family counseling and / or coordination of care. We went over current diagnosis, most recent studies and any changes in treatment.    Thank you for the opportunity to participate in the care of this patient.    JALEEL AGUIRRE MD  ADVANCED HEART FAILURE AND MECHANICAL CIRCULATORY SUPPORT  [facility]

## 2024-06-06 LAB
APTT PPP: 62.7 SEC (ref 21.6–35.6)
INR PPP: 3.8
PROTHROMBIN TIME: 37.4 SEC (ref 12–14.6)

## 2024-06-07 ENCOUNTER — ANTICOAG VISIT (OUTPATIENT)
Dept: CARDIOLOGY CLINIC | Facility: CLINIC | Age: 74
End: 2024-06-07
Payer: MEDICARE

## 2024-06-07 DIAGNOSIS — Z79.01 LONG TERM (CURRENT) USE OF ANTICOAGULANTS: ICD-10-CM

## 2024-06-07 DIAGNOSIS — I48.11 LONGSTANDING PERSISTENT ATRIAL FIBRILLATION (HCC): Primary | ICD-10-CM

## 2024-06-07 PROCEDURE — 93793 ANTICOAG MGMT PT WARFARIN: CPT | Performed by: PHYSICIAN ASSISTANT

## 2024-06-13 LAB
APTT PPP: 56 SEC (ref 21.6–35.6)
INR PPP: 2.9
PROTHROMBIN TIME: 30.4 SEC (ref 12–14.6)

## 2024-06-14 ENCOUNTER — TELEPHONE (OUTPATIENT)
Dept: CARDIOLOGY CLINIC | Facility: CLINIC | Age: 74
End: 2024-06-14

## 2024-06-14 ENCOUNTER — ANTICOAG VISIT (OUTPATIENT)
Dept: CARDIOLOGY CLINIC | Facility: CLINIC | Age: 74
End: 2024-06-14
Payer: MEDICARE

## 2024-06-14 DIAGNOSIS — I48.11 LONGSTANDING PERSISTENT ATRIAL FIBRILLATION (HCC): Primary | ICD-10-CM

## 2024-06-14 DIAGNOSIS — Z79.01 LONG TERM (CURRENT) USE OF ANTICOAGULANTS: ICD-10-CM

## 2024-06-14 PROCEDURE — 93793 ANTICOAG MGMT PT WARFARIN: CPT | Performed by: PHYSICIAN ASSISTANT

## 2024-06-14 NOTE — PATIENT INSTRUCTIONS
If no changes in medication health or diet. No missed or extra doses. No s/s of bleeding TIA or CVA. No new ABX, NSAIDs OTC meds, or supplements. Dose as instructed and recheck in two weeks.   Jessica Mata PA-C

## 2024-06-14 NOTE — TELEPHONE ENCOUNTER
Jessica Mata PA-C  [facility]  4 mg tu and th and 2 mg the rest and recheck in two weeks  Thank you

## 2024-06-27 LAB
APTT PPP: 39.4 SEC (ref 21.6–35.6)
INR PPP: 2.3
PROTHROMBIN TIME: 25 SEC (ref 12–14.6)

## 2024-06-28 ENCOUNTER — ANTICOAG VISIT (OUTPATIENT)
Dept: CARDIOLOGY CLINIC | Facility: CLINIC | Age: 74
End: 2024-06-28
Payer: MEDICARE

## 2024-06-28 DIAGNOSIS — I48.11 LONGSTANDING PERSISTENT ATRIAL FIBRILLATION (HCC): Primary | ICD-10-CM

## 2024-06-28 DIAGNOSIS — Z79.01 LONG TERM (CURRENT) USE OF ANTICOAGULANTS: ICD-10-CM

## 2024-06-28 PROCEDURE — 93793 ANTICOAG MGMT PT WARFARIN: CPT | Performed by: PHYSICIAN ASSISTANT

## 2024-06-28 NOTE — PATIENT INSTRUCTIONS
LMAM to adjust dose and recheck in one month.  Patient to call if questions, concerns or changes in medication health or diet.  Jessica Mata PA-C

## 2024-07-06 ENCOUNTER — HOSPITAL ENCOUNTER (EMERGENCY)
Facility: HOSPITAL | Age: 74
Discharge: HOME | End: 2024-07-07
Attending: INTERNAL MEDICINE | Admitting: EMERGENCY MEDICINE
Payer: MEDICARE

## 2024-07-06 ENCOUNTER — APPOINTMENT (EMERGENCY)
Dept: RADIOLOGY | Facility: HOSPITAL | Age: 74
End: 2024-07-06
Payer: MEDICARE

## 2024-07-06 VITALS
BODY MASS INDEX: 74.17 KG/M2 | SYSTOLIC BLOOD PRESSURE: 120 MMHG | DIASTOLIC BLOOD PRESSURE: 60 MMHG | RESPIRATION RATE: 19 BRPM | OXYGEN SATURATION: 92 % | TEMPERATURE: 97.6 F | HEART RATE: 56 BPM | WEIGHT: 293 LBS

## 2024-07-06 DIAGNOSIS — R06.02 SOB (SHORTNESS OF BREATH): ICD-10-CM

## 2024-07-06 DIAGNOSIS — R05.9 COUGH: Primary | ICD-10-CM

## 2024-07-06 LAB
2HR DELTA HS TROPONIN: -1 NG/L
ALBUMIN SERPL BCG-MCNC: 3.1 G/DL (ref 3.5–5)
ALP SERPL-CCNC: 103 U/L (ref 34–104)
ALT SERPL W P-5'-P-CCNC: 12 U/L (ref 7–52)
ANION GAP SERPL CALCULATED.3IONS-SCNC: 7 MMOL/L (ref 4–13)
AST SERPL W P-5'-P-CCNC: 17 U/L (ref 13–39)
BASOPHILS # BLD AUTO: 0.06 THOUSANDS/ÂΜL (ref 0–0.1)
BASOPHILS NFR BLD AUTO: 1 % (ref 0–1)
BILIRUB SERPL-MCNC: 0.64 MG/DL (ref 0.2–1)
BNP SERPL-MCNC: 160 PG/ML (ref 0–100)
BUN SERPL-MCNC: 29 MG/DL (ref 5–25)
CALCIUM ALBUM COR SERPL-MCNC: 9.3 MG/DL (ref 8.3–10.1)
CALCIUM SERPL-MCNC: 8.6 MG/DL (ref 8.4–10.2)
CARDIAC TROPONIN I PNL SERPL HS: 8 NG/L
CARDIAC TROPONIN I PNL SERPL HS: 9 NG/L
CHLORIDE SERPL-SCNC: 98 MMOL/L (ref 96–108)
CO2 SERPL-SCNC: 35 MMOL/L (ref 21–32)
CREAT SERPL-MCNC: 1.09 MG/DL (ref 0.6–1.3)
EOSINOPHIL # BLD AUTO: 0.29 THOUSAND/ÂΜL (ref 0–0.61)
EOSINOPHIL NFR BLD AUTO: 2 % (ref 0–6)
ERYTHROCYTE [DISTWIDTH] IN BLOOD BY AUTOMATED COUNT: 17.2 % (ref 11.6–15.1)
FLUAV RNA RESP QL NAA+PROBE: NEGATIVE
FLUBV RNA RESP QL NAA+PROBE: NEGATIVE
GFR SERPL CREATININE-BSD FRML MDRD: 50 ML/MIN/1.73SQ M
GLUCOSE SERPL-MCNC: 125 MG/DL (ref 65–140)
HCT VFR BLD AUTO: 40.2 % (ref 34.8–46.1)
HGB BLD-MCNC: 11.4 G/DL (ref 11.5–15.4)
IMM GRANULOCYTES # BLD AUTO: 0.07 THOUSAND/UL (ref 0–0.2)
IMM GRANULOCYTES NFR BLD AUTO: 1 % (ref 0–2)
LYMPHOCYTES # BLD AUTO: 1.12 THOUSANDS/ÂΜL (ref 0.6–4.47)
LYMPHOCYTES NFR BLD AUTO: 9 % (ref 14–44)
MCH RBC QN AUTO: 24.9 PG (ref 26.8–34.3)
MCHC RBC AUTO-ENTMCNC: 28.4 G/DL (ref 31.4–37.4)
MCV RBC AUTO: 88 FL (ref 82–98)
MONOCYTES # BLD AUTO: 0.63 THOUSAND/ÂΜL (ref 0.17–1.22)
MONOCYTES NFR BLD AUTO: 5 % (ref 4–12)
NEUTROPHILS # BLD AUTO: 11.05 THOUSANDS/ÂΜL (ref 1.85–7.62)
NEUTS SEG NFR BLD AUTO: 82 % (ref 43–75)
NRBC BLD AUTO-RTO: 0 /100 WBCS
PLATELET # BLD AUTO: 459 THOUSANDS/UL (ref 149–390)
PMV BLD AUTO: 8.7 FL (ref 8.9–12.7)
POTASSIUM SERPL-SCNC: 3.6 MMOL/L (ref 3.5–5.3)
PROT SERPL-MCNC: 8.1 G/DL (ref 6.4–8.4)
RBC # BLD AUTO: 4.58 MILLION/UL (ref 3.81–5.12)
RSV RNA RESP QL NAA+PROBE: NEGATIVE
SARS-COV-2 RNA RESP QL NAA+PROBE: NEGATIVE
SODIUM SERPL-SCNC: 140 MMOL/L (ref 135–147)
WBC # BLD AUTO: 13.22 THOUSAND/UL (ref 4.31–10.16)

## 2024-07-06 PROCEDURE — 71045 X-RAY EXAM CHEST 1 VIEW: CPT

## 2024-07-06 PROCEDURE — 83880 ASSAY OF NATRIURETIC PEPTIDE: CPT | Performed by: PHYSICIAN ASSISTANT

## 2024-07-06 PROCEDURE — 80053 COMPREHEN METABOLIC PANEL: CPT

## 2024-07-06 PROCEDURE — 0241U HB NFCT DS VIR RESP RNA 4 TRGT: CPT

## 2024-07-06 PROCEDURE — 99285 EMERGENCY DEPT VISIT HI MDM: CPT

## 2024-07-06 PROCEDURE — 36415 COLL VENOUS BLD VENIPUNCTURE: CPT

## 2024-07-06 PROCEDURE — 84484 ASSAY OF TROPONIN QUANT: CPT

## 2024-07-06 PROCEDURE — 85025 COMPLETE CBC W/AUTO DIFF WBC: CPT

## 2024-07-06 PROCEDURE — 99285 EMERGENCY DEPT VISIT HI MDM: CPT | Performed by: PHYSICIAN ASSISTANT

## 2024-07-06 PROCEDURE — 93005 ELECTROCARDIOGRAM TRACING: CPT

## 2024-07-06 RX ORDER — BENZONATATE 100 MG/1
100 CAPSULE ORAL EVERY 8 HOURS
Qty: 21 CAPSULE | Refills: 0 | Status: SHIPPED | OUTPATIENT
Start: 2024-07-06

## 2024-07-06 RX ORDER — BENZONATATE 100 MG/1
100 CAPSULE ORAL ONCE
Status: COMPLETED | OUTPATIENT
Start: 2024-07-06 | End: 2024-07-06

## 2024-07-06 RX ADMIN — BENZONATATE 100 MG: 100 CAPSULE ORAL at 16:24

## 2024-07-06 NOTE — ED PROVIDER NOTES
History  Chief Complaint   Patient presents with    Shortness of Breath     Patient is a 73-year-old female with a PMHx of A-fib (on Coumadin), CHF, hypothyroidism and morbid obesity, presenting to the ED for evaluation of a cough and shortness of breath.  Patient states that she has had a productive cough, congestion and runny nose for the past week.  She states that she has been experiencing shortness of breath since last night and became concerned that she may need oxygen which she does not have at home.  She denies any chest pain, orthopnea or lower extremity edema.  She denies any pleuritic pain, hemoptysis, calf pain/swelling, recent travel, recent surgery, exogenous estrogen use or hx of PE/DVT. She reports compliance with all of her medications including her coumadin. She denies any fevers, nausea, vomiting, abdominal pain, diarrhea, constipation or urinary symptoms.        Prior to Admission Medications   Prescriptions Last Dose Informant Patient Reported? Taking?   DULoxetine (CYMBALTA) 20 mg capsule  Self No No   Sig: Take 1 capsule (20 mg total) by mouth daily   Patient taking differently: Take 20 mg by mouth daily Every other day   acetaminophen (TYLENOL) 650 mg CR tablet  Self Yes No   Sig: Take 650 mg by mouth every 8 (eight) hours as needed (for osteoarthritis)   allopurinol (ZYLOPRIM) 100 mg tablet  Self No No   Sig: Take 1 tablet (100 mg total) by mouth daily   furosemide (LASIX) 40 mg tablet   No No   Sig: Take 1.5 tablets (60 mg total) by mouth daily   gabapentin (NEURONTIN) 100 mg capsule   No No   Sig: Take 1 capsule (100 mg total) by mouth 3 (three) times a day   iron polysaccharides (Ferrex 150) 150 mg capsule   Yes No   Sig: Take 150 mg by mouth 2 (two) times a day. Indications: Anemia From Inadequate Iron in the Body   Patient not taking: Reported on 5/23/2024   levothyroxine 125 mcg tablet  Self Yes No   Sig: Take 137 mcg by mouth daily   midodrine (PROAMATINE) 2.5 mg tablet   No No   Sig:  Take 1 tablet (2.5 mg total) by mouth 3 (three) times a day before meals   Patient taking differently: Take 2.5 mg by mouth 3 (three) times a day as needed   nystatin (MYCOSTATIN) powder   No No   Sig: Apply topically 2 (two) times a day   Patient not taking: Reported on 5/23/2024   pantoprazole (PROTONIX) 40 mg tablet   No No   Sig: Take 1 tablet (40 mg total) by mouth daily in the early morning   Patient not taking: Reported on 5/23/2024   triamcinolone (KENALOG) 0.5 % cream   Yes No   Sig: Apply 1 Application topically as needed for rash or irritation   warfarin (Coumadin) 5 mg tablet   No No   Sig: Take 1 tablet (5 mg total) by mouth daily   Patient taking differently: Take 5 mg by mouth daily 2 mg M,W,F SAT, SUN  4 mg T,TH,      Facility-Administered Medications: None       Past Medical History:   Diagnosis Date    Atrial fibrillation (HCC)     Bladder stone     C. difficile colitis     Cellulitis     CHF (congestive heart failure) (HCC)     Depression with anxiety     Disease of thyroid gland     Diverticulitis     Enterocolitis     History of abnormal cervical Pap smear     Kidney stone     Lymphedema     Menopause     Age 50    Morbid obesity with BMI of 70 and over, adult ([facility])     MRSA (methicillin resistant Staphylococcus aureus)     abdominal wound    Osteoarthritis     Phlebitis     left lower leg    Spondylolysis        Past Surgical History:   Procedure Laterality Date    APPENDECTOMY      CARPAL TUNNEL RELEASE      CHOLECYSTECTOMY      CYSTOSCOPY      stent    FOOT SURGERY  1982    bone spur    KNEE SURGERY      WISDOM TOOTH EXTRACTION         Family History   Problem Relation Age of Onset    Heart failure Mother     Heart disease Mother     Lymphoma Mother     Seizures Mother     Kidney disease Father     Heart disease Father     Heart failure Father     Diabetes type II Father     Diabetes type II Sister     Diabetes type II Brother     Uterine cancer Paternal Aunt     Breast cancer Neg Hx      Colon cancer Neg Hx     Ovarian cancer Neg Hx      I have reviewed and agree with the history as documented.    E-Cigarette/Vaping    E-Cigarette Use Never User      E-Cigarette/Vaping Substances    Nicotine No     THC No     CBD No     Flavoring No     Other No     Unknown No      Social History     Tobacco Use    Smoking status: Former     Current packs/day: 0.00     Average packs/day: 5.0 packs/day for 3.0 years (15.0 ttl pk-yrs)     Types: Cigarettes     Start date: 1967     Quit date: 1970     Years since quittin.0    Smokeless tobacco: Never    Tobacco comments:     5 packs/day until  (age 16-19) - As per [employer]    Vaping Use    Vaping status: Never Used   Substance Use Topics    Alcohol use: Not Currently    Drug use: Not Currently     Types: Marijuana     Comment: As a teen - As per [employer]        Review of Systems   Constitutional:  Negative for chills and fever.   HENT:  Positive for congestion and rhinorrhea. Negative for sore throat.    Eyes:  Negative for visual disturbance.   Respiratory:  Positive for cough and shortness of breath.    Cardiovascular:  Negative for chest pain, palpitations and leg swelling.   Gastrointestinal:  Negative for abdominal pain, constipation, diarrhea, nausea and vomiting.   Genitourinary:  Negative for dysuria, flank pain, frequency and hematuria.   Musculoskeletal:  Negative for back pain and neck pain.   Skin:  Negative for rash.   Neurological:  Negative for dizziness, syncope, weakness and headaches.   All other systems reviewed and are negative.      Physical Exam  Physical Exam  Vitals and nursing note reviewed.   Constitutional:       General: She is awake.      Appearance: She is morbidly obese. She is not toxic-appearing or diaphoretic.   HENT:      Head: Normocephalic and atraumatic.      Right Ear: External ear normal.      Left Ear: External ear normal.      Nose: Nose normal.      Mouth/Throat:      Lips: Pink.      Mouth: Mucous  membranes are moist.   Eyes:      General: Lids are normal. No scleral icterus.     Conjunctiva/sclera: Conjunctivae normal.      Pupils: Pupils are equal, round, and reactive to light.   Cardiovascular:      Rate and Rhythm: Normal rate and regular rhythm.      Pulses: Normal pulses.           Radial pulses are 2+ on the right side and 2+ on the left side.      Heart sounds: Normal heart sounds, S1 normal and S2 normal.   Pulmonary:      Effort: Pulmonary effort is normal. No tachypnea, accessory muscle usage or respiratory distress.      Breath sounds: Normal breath sounds. No stridor. No decreased breath sounds, wheezing, rhonchi or rales.      Comments: Lungs clear to auscultation bilaterally.  No tachypnea, accessory muscle usage or retractions.  Patient is able to speak in full sentences without difficulty.  SpO2 96% on room air.  Abdominal:      General: Abdomen is flat. Bowel sounds are normal. There is no distension.      Palpations: Abdomen is soft.      Tenderness: There is no abdominal tenderness. There is no right CVA tenderness, left CVA tenderness, guarding or rebound.   Musculoskeletal:      Cervical back: Full passive range of motion without pain and neck supple. No signs of trauma. No pain with movement.      Right lower leg: No edema.      Left lower leg: No edema.   Lymphadenopathy:      Cervical: No cervical adenopathy.   Skin:     General: Skin is warm and dry.      Capillary Refill: Capillary refill takes less than 2 seconds.      Coloration: Skin is not cyanotic, jaundiced or pale.   Neurological:      Mental Status: She is alert and oriented to person, place, and time.      GCS: GCS eye subscore is 4. GCS verbal subscore is 5. GCS motor subscore is 6.      Gait: Gait normal.   Psychiatric:         Mood and Affect: Mood normal.         Speech: Speech normal.         Behavior: Behavior is cooperative.         Vital Signs  ED Triage Vitals [07/06/24 1414]   Temperature Pulse Respirations Blood  Pressure SpO2   97.6 °F (36.4 °C) 66 20 (!) 177/92 96 %      Temp Source Heart Rate Source Patient Position - Orthostatic VS BP Location FiO2 (%)   Temporal Monitor Lying Left arm --      Pain Score       7           Vitals:    07/06/24 1630 07/06/24 1700 07/06/24 1730 07/06/24 1800   BP: 139/59 147/64 120/60    Pulse: 71 68 61 56   Patient Position - Orthostatic VS:             Visual Acuity      ED Medications  Medications   benzonatate (TESSALON PERLES) capsule 100 mg (100 mg Oral Given 7/6/24 1624)       Diagnostic Studies  Results Reviewed       Procedure Component Value Units Date/Time    HS Troponin I 2hr [[id number]]  (Normal) Collected: 07/06/24 1619    Lab Status: Final result Specimen: Blood from Arm, Right Updated: 07/06/24 1700     hs TnI 2hr 8 ng/L      Delta 2hr hsTnI -1 ng/L     FLU/RSV/COVID - if FLU/RSV clinically relevant [[id number]]  (Normal) Collected: 07/06/24 1428    Lab Status: Final result Specimen: Nares from Nose Updated: 07/06/24 1515     SARS-CoV-2 Negative     INFLUENZA A PCR Negative     INFLUENZA B PCR Negative     RSV PCR Negative    Narrative:      FOR PEDIATRIC PATIENTS - copy/paste COVID Guidelines URL to browser: [email or web address]    SARS-CoV-2 assay is a Nucleic Acid Amplification assay intended for the  qualitative detection of nucleic acid from SARS-CoV-2 in nasopharyngeal  swabs. Results are for the presumptive identification of SARS-CoV-2 RNA.    Positive results are indicative of infection with SARS-CoV-2, the virus  causing COVID-19, but do not rule out bacterial infection or co-infection  with other viruses. Laboratories within the [place] and its  territories are required to report all positive results to the appropriate  public health authorities. Negative results do not preclude SARS-CoV-2  infection and should not be used as the sole basis for treatment or other  patient management decisions. Negative  results must be combined with  clinical observations, patient history, and epidemiological information.  This test has not been FDA cleared or approved.    This test has been authorized by FDA under an Emergency Use Authorization  (EUA). This test is only authorized for the duration of time the  declaration that circumstances exist justifying the authorization of the  emergency use of an in vitro diagnostic tests for detection of SARS-CoV-2  virus and/or diagnosis of COVID-19 infection under section 564(b)(1) of  the Act, 21 U.S.C. 360bbb-3(b)(1), unless the authorization is terminated  or revoked sooner. The test has been validated but independent review by FDA  and CLIA is pending.    Test performed using [employer] GeneXpert: This RT-PCR assay targets N2,  a region unique to SARS-CoV-2. A conserved region in the E-gene was chosen  for pan-Sarbecovirus detection which includes SARS-CoV-2.    According to CMS-2020-01-R, this platform meets the definition of high-throughput technology.    B-Type Natriuretic Peptide(BNP) [[id number]]  (Abnormal) Collected: 07/06/24 1428    Lab Status: Final result Specimen: Blood from Arm, Right Updated: 07/06/24 1512      pg/mL     HS Troponin 0hr (reflex protocol) [[id number]]  (Normal) Collected: 07/06/24 1428    Lab Status: Final result Specimen: Blood from Arm, Right Updated: 07/06/24 1459     hs TnI 0hr 9 ng/L     Comprehensive metabolic panel [[id number]]  (Abnormal) Collected: 07/06/24 1428    Lab Status: Final result Specimen: Blood from Arm, Right Updated: 07/06/24 1455     Sodium 140 mmol/L      Potassium 3.6 mmol/L      Chloride 98 mmol/L      CO2 35 mmol/L      ANION GAP 7 mmol/L      BUN 29 mg/dL      Creatinine 1.09 mg/dL      Glucose 125 mg/dL      Calcium 8.6 mg/dL      Corrected Calcium 9.3 mg/dL      AST 17 U/L      ALT 12 U/L      Alkaline Phosphatase 103 U/L      Total Protein 8.1 g/dL      Albumin 3.1 g/dL      Total Bilirubin 0.64 mg/dL      eGFR 50  ml/min/1.73sq m     Narrative:      National Kidney Disease Foundation guidelines for Chronic Kidney Disease (CKD):     Stage 1 with normal or high GFR (GFR > 90 mL/min/1.73 square meters)    Stage 2 Mild CKD (GFR = 60-89 mL/min/1.73 square meters)    Stage 3A Moderate CKD (GFR = 45-59 mL/min/1.73 square meters)    Stage 3B Moderate CKD (GFR = 30-44 mL/min/1.73 square meters)    Stage 4 Severe CKD (GFR = 15-29 mL/min/1.73 square meters)    Stage 5 End Stage CKD (GFR <15 mL/min/1.73 square meters)  Note: GFR calculation is accurate only with a steady state creatinine    CBC and differential [[id number]]  (Abnormal) Collected: 07/06/24 1428    Lab Status: Final result Specimen: Blood from Arm, Right Updated: 07/06/24 1438     WBC 13.22 Thousand/uL      RBC 4.58 Million/uL      Hemoglobin 11.4 g/dL      Hematocrit 40.2 %      MCV 88 fL      MCH 24.9 pg      MCHC 28.4 g/dL      RDW 17.2 %      MPV 8.7 fL      Platelets 459 Thousands/uL      nRBC 0 /100 WBCs      Segmented % 82 %      Immature Grans % 1 %      Lymphocytes % 9 %      Monocytes % 5 %      Eosinophils Relative 2 %      Basophils Relative 1 %      Absolute Neutrophils 11.05 Thousands/µL      Absolute Immature Grans 0.07 Thousand/uL      Absolute Lymphocytes 1.12 Thousands/µL      Absolute Monocytes 0.63 Thousand/µL      Eosinophils Absolute 0.29 Thousand/µL      Basophils Absolute 0.06 Thousands/µL                    XR chest 1 view portable   Final Result by Blanca Garza MD (07/06 1523)      Mild pulmonary venous congestion.            Workstation performed: [id number]                    Procedures  ECG 12 Lead Documentation Only    Date/Time: 7/6/2024 2:28 PM    Performed by: Elba Pena PA-C  Authorized by: Elba Pena PA-C    Indications / Diagnosis:  SOB  ECG reviewed by me, the ED Provider: yes    Patient location:  ED  Previous ECG:     Previous ECG:  Unavailable    Comparison to cardiac monitor: Yes    Rate:     ECG rate:  59     ECG rate assessment: bradycardic    Rhythm:     Rhythm: atrial fibrillation    Ectopy:     Ectopy: PVCs      PVCs:  Infrequent  QRS:     QRS axis:  Normal    QRS intervals:  Normal  ST segments:     ST segments:  Normal  T waves:     T waves: normal    Comments:      No STEMI.             ED Course  ED Course as of 07/07/24 1825   Sat Jul 06, 2024 1440 WBC(!): 13.22  Elevated, appears to be chronically elevated based on prior labs.             HEART Risk Score      Flowsheet Row Most Recent Value   Heart Score Risk Calculator    History 0 Filed at: 07/07/2024 1820   ECG 0 Filed at: 07/07/2024 1820   Age 2 Filed at: 07/07/2024 1820   Risk Factors 2 Filed at: 07/07/2024 1820   Troponin 0 Filed at: 07/07/2024 1820   HEART Score 4 Filed at: 07/07/2024 1820                          SBIRT 22yo+      Flowsheet Row Most Recent Value   Initial Alcohol Screen: US AUDIT-C     1. How often do you have a drink containing alcohol? 0 Filed at: 07/06/2024 1440   2. How many drinks containing alcohol do you have on a typical day you are drinking?  0 Filed at: 07/06/2024 1440   3a. Male UNDER 65: How often do you have five or more drinks on one occasion? 0 Filed at: 07/06/2024 1440   3b. FEMALE Any Age, or MALE 65+: How often do you have 4 or more drinks on one occassion? 0 Filed at: 07/06/2024 1440   Audit-C Score 0 Filed at: 07/06/2024 1440   JYOTI: How many times in the past year have you...    Used an illegal drug or used a prescription medication for non-medical reasons? Never Filed at: 07/06/2024 1440                      Medical Decision Making  Patient is a 73-year-old female with a PMHx of A-fib (on Coumadin), CHF, hypothyroidism and morbid obesity, presenting to the ED for evaluation of a cough and shortness of breath.      DDX including but not limited to: pneumonia, pleural effusion, bronchitis, bronchiolitis, viral illness, CHF, PE, ACS, MI, COVID 19, anemia, metabolic abnormality, renal failure.     Chest x-ray shows  no evidence of pneumonia, pleural effusion or significant pulmonary edema.  BNP minimally elevated but appears to be at patient's baseline.  EKG shows rate controlled atrial fibrillation with no acute ischemic changes and troponin is negative x 2, not consistent with ACS/MI.  Patient is on Coumadin and her symptoms are not consistent with PE.  COVID/flu/RSV negative.  Patient's symptoms most consistent with a viral illness or bronchitis.  Patient has no oxygen requirements at this time and her SpO2 levels have been stable while in the ED.  Patient was encouraged to closely monitor her symptoms and return for any worsening shortness of breath, hypoxia, fevers or any other new/worsening symptoms.    The management plan was discussed in detail with the patient at bedside and all questions were answered. Strict ED return instructions were discussed at bedside. Prior to discharge, both verbal and written instructions were provided. We discussed the signs and symptoms that should prompt the patient to return to the ED. All questions were answered and the patient was comfortable with the plan of care and discharged home. The patient agrees to return to the Emergency Department for concerns and/or progression of illness.     Amount and/or Complexity of Data Reviewed  Labs: ordered. Decision-making details documented in ED Course.    Risk  Prescription drug management.             Disposition  Final diagnoses:   Cough   SOB (shortness of breath)     Time reflects when diagnosis was documented in both MDM as applicable and the Disposition within this note       Time User Action Codes Description Comment    7/6/2024  5:07 PM Elba Pena Add [R05.9] Cough     7/6/2024  5:07 PM Elba Pena Add [R06.02] SOB (shortness of breath)           ED Disposition       ED Disposition   Discharge    Condition   Stable    Date/Time   Sat Jul 6, 2024 1707    Comment   Zee Kirk discharge to home/self care.                    Follow-up Information       Follow up With Specialties Details Why Contact Info Additional Information    Franklyn Caruso MD Nephrology, Internal Medicine Schedule an appointment as soon as possible for a visit   [address] [place] [address]  [phone]       [facility] Emergency Department Emergency Medicine  If symptoms worsen [address]  [facility] [address] [phone] [facility] Emergency Department, [address], [place], [place] [address]            Discharge Medication List as of 7/6/2024  5:14 PM        START taking these medications    Details   benzonatate (TESSALON PERLES) 100 mg capsule Take 1 capsule (100 mg total) by mouth every 8 (eight) hours, Starting Sat 7/6/2024, Normal           CONTINUE these medications which have NOT CHANGED    Details   acetaminophen (TYLENOL) 650 mg CR tablet Take 650 mg by mouth every 8 (eight) hours as needed (for osteoarthritis), Historical Med      allopurinol (ZYLOPRIM) 100 mg tablet Take 1 tablet (100 mg total) by mouth daily, Starting Mon 9/30/2019, Normal      DULoxetine (CYMBALTA) 20 mg capsule Take 1 capsule (20 mg total) by mouth daily, Starting Wed 8/15/2018, Normal      furosemide (LASIX) 40 mg tablet Take 1.5 tablets (60 mg total) by mouth daily, Starting Tue 6/4/2024, Until Thu 7/4/2024, No Print      gabapentin (NEURONTIN) 100 mg capsule Take 1 capsule (100 mg total) by mouth 3 (three) times a day, Starting Thu 8/10/2023, Until Tue 6/4/2024, Normal      iron polysaccharides (Ferrex 150) 150 mg capsule Take 150 mg by mouth 2 (two) times a day. Indications: Anemia From Inadequate Iron in the Body, Historical Med      levothyroxine 125 mcg tablet Take 137 mcg by mouth daily, Historical Med      midodrine (PROAMATINE) 2.5 mg tablet Take 1 tablet (2.5 mg total) by mouth 3 (three) times a day before meals, Starting Mon 8/28/2023, Until Tue 6/4/2024, Normal      nystatin  (MYCOSTATIN) powder Apply topically 2 (two) times a day, Starting Mon 9/18/2023, Normal      pantoprazole (PROTONIX) 40 mg tablet Take 1 tablet (40 mg total) by mouth daily in the early morning, Starting Sat 1/20/2024, Until Mon 2/19/2024, Normal      triamcinolone (KENALOG) 0.5 % cream Apply 1 Application topically as needed for rash or irritation, Historical Med      warfarin (Coumadin) 5 mg tablet Take 1 tablet (5 mg total) by mouth daily, Starting Tue 5/28/2024, Until Thu 6/27/2024, Normal             No discharge procedures on file.    PDMP Review         Value Time User    PDMP Reviewed  Yes 5/27/2022 11:10 PM Anjelica Dey PA-C            ED Provider  Electronically Signed by             Elba Pena PA-C  07/07/24 1204

## 2024-07-07 LAB
ATRIAL RATE: 56 BPM
ATRIAL RATE: 57 BPM
QRS AXIS: -7 DEGREES
QRS AXIS: -9 DEGREES
QRSD INTERVAL: 74 MS
QRSD INTERVAL: 78 MS
QT INTERVAL: 304 MS
QT INTERVAL: 422 MS
QTC INTERVAL: 300 MS
QTC INTERVAL: 452 MS
T WAVE AXIS: -41 DEGREES
T WAVE AXIS: 182 DEGREES
VENTRICULAR RATE: 59 BPM
VENTRICULAR RATE: 69 BPM

## 2024-07-07 PROCEDURE — 93010 ELECTROCARDIOGRAM REPORT: CPT | Performed by: INTERNAL MEDICINE

## 2024-08-01 LAB
INR PPP: 2.7
PROTHROMBIN TIME: 28.1 SEC (ref 12–14.6)

## 2024-08-02 ENCOUNTER — ANTICOAG VISIT (OUTPATIENT)
Dept: CARDIOLOGY CLINIC | Facility: CLINIC | Age: 74
End: 2024-08-02
Payer: MEDICARE

## 2024-08-02 DIAGNOSIS — I48.11 LONGSTANDING PERSISTENT ATRIAL FIBRILLATION (HCC): Primary | ICD-10-CM

## 2024-08-02 DIAGNOSIS — Z79.01 LONG TERM (CURRENT) USE OF ANTICOAGULANTS: ICD-10-CM

## 2024-08-02 PROCEDURE — 93793 ANTICOAG MGMT PT WARFARIN: CPT | Performed by: PHYSICIAN ASSISTANT

## 2024-08-02 NOTE — PATIENT INSTRUCTIONS
Spoke with Patient: No changes in medication health or diet. No missed or extra doses. No s/s of bleeding TIA or CVA. No new ABX, NSAIDs OTC meds, or supplements. Dose as instructed and recheck in one month.   Jessica Mata PA-C

## 2024-08-29 LAB
INR PPP: 2.7
PROTHROMBIN TIME: 28.9 SEC (ref 12–14.6)

## 2024-08-30 ENCOUNTER — ANTICOAG VISIT (OUTPATIENT)
Dept: CARDIOLOGY CLINIC | Facility: CLINIC | Age: 74
End: 2024-08-30
Payer: MEDICARE

## 2024-08-30 DIAGNOSIS — Z79.01 LONG TERM (CURRENT) USE OF ANTICOAGULANTS: ICD-10-CM

## 2024-08-30 DIAGNOSIS — I48.11 LONGSTANDING PERSISTENT ATRIAL FIBRILLATION (HCC): Primary | ICD-10-CM

## 2024-08-30 PROCEDURE — 93793 ANTICOAG MGMT PT WARFARIN: CPT | Performed by: PHYSICIAN ASSISTANT

## 2024-09-16 NOTE — ASSESSMENT & PLAN NOTE
Letter placed in chart, can someone please fax this.    I will send updated script to [facility] pharmacy    · Chronic lymphedema with panniculitis  · Continue IV furosemide to expedite diuresis

## 2024-09-17 ENCOUNTER — HOSPITAL ENCOUNTER (OUTPATIENT)
Dept: ULTRASOUND IMAGING | Facility: HOSPITAL | Age: 74
Discharge: HOME | End: 2024-09-17
Attending: INTERNAL MEDICINE

## 2024-09-20 ENCOUNTER — APPOINTMENT (EMERGENCY)
Dept: RADIOLOGY | Facility: HOSPITAL | Age: 74
End: 2024-09-20
Payer: MEDICARE

## 2024-09-20 ENCOUNTER — HOSPITAL ENCOUNTER (EMERGENCY)
Facility: HOSPITAL | Age: 74
Discharge: HOME | End: 2024-09-20
Attending: EMERGENCY MEDICINE
Payer: MEDICARE

## 2024-09-20 VITALS
TEMPERATURE: 97.1 F | OXYGEN SATURATION: 94 % | SYSTOLIC BLOOD PRESSURE: 142 MMHG | RESPIRATION RATE: 18 BRPM | HEART RATE: 68 BPM | DIASTOLIC BLOOD PRESSURE: 67 MMHG

## 2024-09-20 DIAGNOSIS — R07.89 CHEST TIGHTNESS: ICD-10-CM

## 2024-09-20 DIAGNOSIS — R06.02 SOB (SHORTNESS OF BREATH): Primary | ICD-10-CM

## 2024-09-20 DIAGNOSIS — D64.9 ANEMIA: ICD-10-CM

## 2024-09-20 LAB
2HR DELTA HS TROPONIN: 0 NG/L
ALBUMIN SERPL BCG-MCNC: 2.8 G/DL (ref 3.5–5)
ALP SERPL-CCNC: 77 U/L (ref 34–104)
ALT SERPL W P-5'-P-CCNC: 5 U/L (ref 7–52)
ANION GAP SERPL CALCULATED.3IONS-SCNC: 7 MMOL/L (ref 4–13)
AST SERPL W P-5'-P-CCNC: 15 U/L (ref 13–39)
BACTERIA UR QL AUTO: NORMAL /HPF
BASOPHILS # BLD AUTO: 0.04 THOUSANDS/ΜL (ref 0–0.1)
BASOPHILS NFR BLD AUTO: 1 % (ref 0–1)
BILIRUB SERPL-MCNC: 0.39 MG/DL (ref 0.2–1)
BILIRUB UR QL STRIP: NEGATIVE
BNP SERPL-MCNC: 142 PG/ML (ref 0–100)
BUN SERPL-MCNC: 22 MG/DL (ref 5–25)
CALCIUM ALBUM COR SERPL-MCNC: 9.3 MG/DL (ref 8.3–10.1)
CALCIUM SERPL-MCNC: 8.3 MG/DL (ref 8.4–10.2)
CARDIAC TROPONIN I PNL SERPL HS: 9 NG/L
CARDIAC TROPONIN I PNL SERPL HS: 9 NG/L
CHLORIDE SERPL-SCNC: 99 MMOL/L (ref 96–108)
CLARITY UR: CLEAR
CO2 SERPL-SCNC: 31 MMOL/L (ref 21–32)
COLOR UR: YELLOW
CREAT SERPL-MCNC: 1.11 MG/DL (ref 0.6–1.3)
EOSINOPHIL # BLD AUTO: 0.08 THOUSAND/ΜL (ref 0–0.61)
EOSINOPHIL NFR BLD AUTO: 1 % (ref 0–6)
ERYTHROCYTE [DISTWIDTH] IN BLOOD BY AUTOMATED COUNT: 17.3 % (ref 11.6–15.1)
GFR SERPL CREATININE-BSD FRML MDRD: 49 ML/MIN/1.73SQ M
GLUCOSE SERPL-MCNC: 111 MG/DL (ref 65–140)
GLUCOSE UR STRIP-MCNC: NEGATIVE MG/DL
HCT VFR BLD AUTO: 39.1 % (ref 34.8–46.1)
HGB BLD-MCNC: 11.1 G/DL (ref 11.5–15.4)
HGB UR QL STRIP.AUTO: ABNORMAL
IMM GRANULOCYTES # BLD AUTO: 0.04 THOUSAND/UL (ref 0–0.2)
IMM GRANULOCYTES NFR BLD AUTO: 1 % (ref 0–2)
KETONES UR STRIP-MCNC: NEGATIVE MG/DL
LEUKOCYTE ESTERASE UR QL STRIP: NEGATIVE
LYMPHOCYTES # BLD AUTO: 0.84 THOUSANDS/ΜL (ref 0.6–4.47)
LYMPHOCYTES NFR BLD AUTO: 12 % (ref 14–44)
MAGNESIUM SERPL-MCNC: 2 MG/DL (ref 1.9–2.7)
MCH RBC QN AUTO: 24.9 PG (ref 26.8–34.3)
MCHC RBC AUTO-ENTMCNC: 28.4 G/DL (ref 31.4–37.4)
MCV RBC AUTO: 88 FL (ref 82–98)
MONOCYTES # BLD AUTO: 0.64 THOUSAND/ΜL (ref 0.17–1.22)
MONOCYTES NFR BLD AUTO: 9 % (ref 4–12)
NEUTROPHILS # BLD AUTO: 5.57 THOUSANDS/ΜL (ref 1.85–7.62)
NEUTS SEG NFR BLD AUTO: 76 % (ref 43–75)
NITRITE UR QL STRIP: NEGATIVE
NON-SQ EPI CELLS URNS QL MICRO: NORMAL /HPF
NRBC BLD AUTO-RTO: 0 /100 WBCS
PH UR STRIP.AUTO: 6 [PH]
PLATELET # BLD AUTO: 370 THOUSANDS/UL (ref 149–390)
PMV BLD AUTO: 8.6 FL (ref 8.9–12.7)
POTASSIUM SERPL-SCNC: 3.8 MMOL/L (ref 3.5–5.3)
PROT SERPL-MCNC: 7.7 G/DL (ref 6.4–8.4)
PROT UR STRIP-MCNC: NEGATIVE MG/DL
RBC # BLD AUTO: 4.46 MILLION/UL (ref 3.81–5.12)
RBC #/AREA URNS AUTO: NORMAL /HPF
SODIUM SERPL-SCNC: 137 MMOL/L (ref 135–147)
SP GR UR STRIP.AUTO: 1.02
UROBILINOGEN UR QL STRIP.AUTO: 0.2 E.U./DL
WBC # BLD AUTO: 7.21 THOUSAND/UL (ref 4.31–10.16)
WBC #/AREA URNS AUTO: NORMAL /HPF

## 2024-09-20 PROCEDURE — 81001 URINALYSIS AUTO W/SCOPE: CPT

## 2024-09-20 PROCEDURE — 99285 EMERGENCY DEPT VISIT HI MDM: CPT

## 2024-09-20 PROCEDURE — 80053 COMPREHEN METABOLIC PANEL: CPT

## 2024-09-20 PROCEDURE — 83880 ASSAY OF NATRIURETIC PEPTIDE: CPT

## 2024-09-20 PROCEDURE — 96374 THER/PROPH/DIAG INJ IV PUSH: CPT

## 2024-09-20 PROCEDURE — 36415 COLL VENOUS BLD VENIPUNCTURE: CPT

## 2024-09-20 PROCEDURE — 71045 X-RAY EXAM CHEST 1 VIEW: CPT

## 2024-09-20 PROCEDURE — 81003 URINALYSIS AUTO W/O SCOPE: CPT

## 2024-09-20 PROCEDURE — 84484 ASSAY OF TROPONIN QUANT: CPT

## 2024-09-20 PROCEDURE — 83735 ASSAY OF MAGNESIUM: CPT

## 2024-09-20 PROCEDURE — 93005 ELECTROCARDIOGRAM TRACING: CPT

## 2024-09-20 PROCEDURE — 85025 COMPLETE CBC W/AUTO DIFF WBC: CPT

## 2024-09-20 RX ORDER — FUROSEMIDE 10 MG/ML
80 INJECTION INTRAMUSCULAR; INTRAVENOUS ONCE
Status: COMPLETED | OUTPATIENT
Start: 2024-09-20 | End: 2024-09-20

## 2024-09-20 RX ADMIN — FUROSEMIDE 80 MG: 10 INJECTION, SOLUTION INTRAMUSCULAR; INTRAVENOUS at 14:06

## 2024-09-20 NOTE — ED PROVIDER NOTES
1. SOB (shortness of breath)    2. Chest tightness    3. Anemia      ED Disposition       ED Disposition   Discharge    Condition   Stable    Date/Time   Fri Sep 20, 2024  4:29 PM    Comment   Zee Kirk discharge to home/self care.                   Assessment & Plan       Medical Decision Making  Patient is a morbidly obese 74-year-old female presenting with worsening shortness of breath and orthopnea since last evening. She is bedbound secondary to body habitus and reports her normal oxygen saturation is 92-94%. She is concerned about acute heart failure. She was 93-94% while was in the room. No respiratory distress, tachypnea, or hypoxia.   Will get cardiac workup including EKG, troponin, and chest x-ray as she complains of shortness of breath and chest tightness since last evening. Will get baseline labs evaluate for leukocytosis, anemia, electrolyte abnormality, LARRY, glucose abnormality, or transaminitis. BNP to help evaluate for acute CHF. Will check electrolytes including magnesium. UA to rule out UTI.  See ED course for interpretation of labs, imaging, and further medical decision making.   Will give 80 mg of Lasix per heart failure algorithm. Labs and imaging were reassuring. No evidence of acute CHF and nothing warranting hospitalization at this time. Patient agrees with this and is feeling good and comfortable going home. Nursing staff will set up transfer for the patient home.  Dispo: Patient is safe/stable for discharge home and was discharged home with strict return precautions. Provided verbal and written supportive care instructions for managing her illness. Advised patient to return to the nearest emergency room if she has new or worsening symptoms or if any questions arise. Advised patient to follow-up with her family doctor and cardiologist. Patient is satisfied with care and agrees with management and plan.     Amount and/or Complexity of Data Reviewed  External Data Reviewed: labs,  radiology and notes.  Labs: ordered. Decision-making details documented in ED Course.  Radiology: ordered and independent interpretation performed.  ECG/medicine tests: ordered and independent interpretation performed.    Risk  Prescription drug management.        HEART Risk Score      Flowsheet Row Most Recent Value   Heart Score Risk Calculator    History 0 Filed at: 09/20/2024 1436   ECG 1 Filed at: 09/20/2024 1436   Age 2 Filed at: 09/20/2024 1436   Risk Factors 2 Filed at: 09/20/2024 1436   Troponin 0 Filed at: 09/20/2024 1436   HEART Score 5 Filed at: 09/20/2024 1436               ED Course as of 09/20/24 1800   Fri Sep 20, 2024   1343 SpO2: 94 %  Vital signs reviewed and WNL. No hypoxia.   1409 WBC: 7.21  No leukocytosis.   1409 Hemoglobin(!): 11.1  Hemoglobin near baseline.   1436 Comprehensive metabolic panel(!)  Electrolytes WNL. No LARRY or glucose abnormality. No transaminitis.   1436 MAGNESIUM: 2.0   1436 BNP(!): 142  No evidence of acute CHF.   1436 hs TnI 0hr: 9  Will repeat at 2 hours.   1436 UA w Reflex to Microscopic w Reflex to Culture(!)  Negative for UTI.   1459 Went over results thus far with patient. She is feeling good at this time and labs are reassuring. Will get 2-hour troponin and then reevaluate.   1743 Delta 2hr hsTnI: 0  Delta troponin is 0. Patient is ready to go home. Will discharge home with strict return precautions.       Medications   furosemide (LASIX) injection 80 mg (80 mg Intravenous Given 9/20/24 1406)       History of Present Illness       Patient is a 74-year-old female with relevant past medical history of atrial fibrillation, cellulitis, CHF, diverticulitis, kidney stone, lymphedema, and morbid obesity presenting with shortness of breath x 2 days. She started with shortness of breath last night around 10 PM and she is concerned for fluid overload. She reports more shortness of breath while laying flat and she felt like fluid was traveling up her body which she felt once  before when she had acute CHF. Her caregiver came to her house this morning and said she should go to the ER to get checked out. She takes Lasix 60 mg in the morning. She complains of very mild shortness of breath right now, her oxygen saturation is at baseline. She complains of very mild chest tightness. She reports chronic fatigue. She is bedbound. No other complaints. She denies fevers, headache, dizziness, weakness, visual changes, abdominal pain, nausea, vomiting, or urinary symptoms.           History provided by:  Patient   used: No    Shortness of Breath  Associated symptoms: no abdominal pain, no chest pain, no cough, no ear pain, no fever, no headaches, no rash, no sore throat and no vomiting        Review of Systems   Constitutional:  Positive for fatigue. Negative for chills and fever.   HENT:  Negative for congestion, ear pain, rhinorrhea and sore throat.    Eyes:  Negative for pain and visual disturbance.   Respiratory:  Positive for chest tightness and shortness of breath. Negative for cough.    Cardiovascular:  Negative for chest pain and palpitations.   Gastrointestinal:  Negative for abdominal pain, constipation, diarrhea, nausea and vomiting.   Genitourinary:  Negative for dysuria, frequency, hematuria and urgency.   Musculoskeletal:  Negative for arthralgias and back pain.   Skin:  Negative for color change and rash.   Neurological:  Negative for dizziness, seizures, syncope, light-headedness and headaches.   Psychiatric/Behavioral:  Negative for agitation and confusion.            Objective     ED Triage Vitals [09/20/24 1335]   Temperature Pulse Blood Pressure Respirations SpO2 Patient Position - Orthostatic VS   (!) 97.1 °F (36.2 °C) 66 132/67 20 94 % Sitting      Temp Source Heart Rate Source BP Location FiO2 (%) Pain Score    Tympanic Monitor Left arm -- --        Physical Exam  Vitals and nursing note reviewed.   Constitutional:       General: She is not in acute  distress.     Appearance: She is well-developed. She is obese. She is not ill-appearing.   HENT:      Head: Normocephalic and atraumatic.   Eyes:      Conjunctiva/sclera: Conjunctivae normal.   Cardiovascular:      Rate and Rhythm: Normal rate and regular rhythm.      Heart sounds: No murmur heard.  Pulmonary:      Effort: Pulmonary effort is normal. No respiratory distress.      Breath sounds: Decreased breath sounds present. No wheezing, rhonchi or rales.   Abdominal:      Palpations: Abdomen is soft.      Tenderness: There is no abdominal tenderness. There is no guarding or rebound.   Musculoskeletal:         General: No swelling.      Cervical back: Neck supple.   Skin:     General: Skin is warm and dry.      Capillary Refill: Capillary refill takes less than 2 seconds.   Neurological:      General: No focal deficit present.      Mental Status: She is alert and oriented to person, place, and time.      GCS: GCS eye subscore is 4. GCS verbal subscore is 5. GCS motor subscore is 6.   Psychiatric:         Mood and Affect: Mood normal.         Labs Reviewed   CBC AND DIFFERENTIAL - Abnormal       Result Value    WBC 7.21      RBC 4.46      Hemoglobin 11.1 (*)     Hematocrit 39.1      MCV 88      MCH 24.9 (*)     MCHC 28.4 (*)     RDW 17.3 (*)     MPV 8.6 (*)     Platelets 370      nRBC 0      Segmented % 76 (*)     Immature Grans % 1      Lymphocytes % 12 (*)     Monocytes % 9      Eosinophils Relative 1      Basophils Relative 1      Absolute Neutrophils 5.57      Absolute Immature Grans 0.04      Absolute Lymphocytes 0.84      Absolute Monocytes 0.64      Eosinophils Absolute 0.08      Basophils Absolute 0.04     B-TYPE NATRIURETIC PEPTIDE (BNP) - Abnormal     (*)    COMPREHENSIVE METABOLIC PANEL - Abnormal    Sodium 137      Potassium 3.8      Chloride 99      CO2 31      ANION GAP 7      BUN 22      Creatinine 1.11      Glucose 111      Calcium 8.3 (*)     Corrected Calcium 9.3      AST 15      ALT 5 (*)      Alkaline Phosphatase 77      Total Protein 7.7      Albumin 2.8 (*)     Total Bilirubin 0.39      eGFR 49      Narrative:     [employer] guidelines for Chronic Kidney Disease (CKD):     Stage 1 with normal or high GFR (GFR > 90 mL/min/1.73 square meters)    Stage 2 Mild CKD (GFR = 60-89 mL/min/1.73 square meters)    Stage 3A Moderate CKD (GFR = 45-59 mL/min/1.73 square meters)    Stage 3B Moderate CKD (GFR = 30-44 mL/min/1.73 square meters)    Stage 4 Severe CKD (GFR = 15-29 mL/min/1.73 square meters)    Stage 5 End Stage CKD (GFR <15 mL/min/1.73 square meters)  Note: GFR calculation is accurate only with a steady state creatinine   UA W REFLEX TO MICROSCOPIC WITH REFLEX TO CULTURE - Abnormal    Color, UA Yellow      Clarity, UA Clear      Specific Gravity, UA 1.020      pH, UA 6.0      Leukocytes, UA Negative      Nitrite, UA Negative      Protein, UA Negative      Glucose, UA Negative      Ketones, UA Negative      Urobilinogen, UA 0.2      Bilirubin, UA Negative      Occult Blood, UA 1+ (*)    HS TROPONIN I 0HR - Normal    hs TnI 0hr 9     MAGNESIUM - Normal    Magnesium 2.0     HS TROPONIN I 2HR - Normal    hs TnI 2hr 9      Delta 2hr hsTnI 0     URINE MICROSCOPIC    RBC, UA 2-4      WBC, UA None Seen      Epithelial Cells Occasional      Bacteria, UA None Seen      Hyaline Casts, UA         X-ray chest 1 view portable   Final Interpretation by Suyapa Woodard MD (09/20 1442)   No acute pulmonary disease. Unchanged cardiomegaly.            Workstation performed: [id number]             ECG 12 Lead Documentation Only    Date/Time: 9/20/2024 2:01 PM    Performed by: Thiago Lal PA-C  Authorized by: Thiago Lal PA-C    Indications / Diagnosis:  Chest tightness, SOB  ECG reviewed by me, the ED Provider: yes    Patient location:  ED  Previous ECG:     Comparison to cardiac monitor: Yes    Interpretation:     Interpretation: abnormal    Rate:     ECG rate:  60    ECG rate  assessment: normal    Rhythm:     Rhythm: atrial fibrillation    Ectopy:     Ectopy: none    QRS:     QRS axis:  Normal    QRS intervals:  Normal  Conduction:     Conduction: normal    ST segments:     ST segments:  Normal  T waves:     T waves: non-specific        ED Medication and Procedure Management   Prior to Admission Medications   Prescriptions Last Dose Informant Patient Reported? Taking?   DULoxetine (CYMBALTA) 20 mg capsule  Self No No   Sig: Take 1 capsule (20 mg total) by mouth daily   Patient taking differently: Take 20 mg by mouth daily Every other day   acetaminophen (TYLENOL) 650 mg CR tablet  Self Yes No   Sig: Take 650 mg by mouth every 8 (eight) hours as needed (for osteoarthritis)   allopurinol (ZYLOPRIM) 100 mg tablet  Self No No   Sig: Take 1 tablet (100 mg total) by mouth daily   benzonatate (TESSALON PERLES) 100 mg capsule   No No   Sig: Take 1 capsule (100 mg total) by mouth every 8 (eight) hours   furosemide (LASIX) 40 mg tablet   No No   Sig: Take 1.5 tablets (60 mg total) by mouth daily   gabapentin (NEURONTIN) 100 mg capsule   No No   Sig: Take 1 capsule (100 mg total) by mouth 3 (three) times a day   iron polysaccharides (Ferrex 150) 150 mg capsule   Yes No   Sig: Take 150 mg by mouth 2 (two) times a day. Indications: Anemia From Inadequate Iron in the Body   Patient not taking: Reported on 5/23/2024   levothyroxine 125 mcg tablet  Self Yes No   Sig: Take 137 mcg by mouth daily   midodrine (PROAMATINE) 2.5 mg tablet   No No   Sig: Take 1 tablet (2.5 mg total) by mouth 3 (three) times a day before meals   Patient taking differently: Take 2.5 mg by mouth 3 (three) times a day as needed   nystatin (MYCOSTATIN) powder   No No   Sig: Apply topically 2 (two) times a day   Patient not taking: Reported on 5/23/2024   pantoprazole (PROTONIX) 40 mg tablet   No No   Sig: Take 1 tablet (40 mg total) by mouth daily in the early morning   Patient not taking: Reported on 5/23/2024   triamcinolone  (KENALOG) 0.5 % cream   Yes No   Sig: Apply 1 Application topically as needed for rash or irritation   warfarin (Coumadin) 5 mg tablet   No No   Sig: Take 1 tablet (5 mg total) by mouth daily   Patient taking differently: Take 5 mg by mouth daily 2 mg M,W,F SAT, SUN  4 mg T,TH,      Facility-Administered Medications: None     Discharge Medication List as of 9/20/2024  5:44 PM        CONTINUE these medications which have NOT CHANGED    Details   acetaminophen (TYLENOL) 650 mg CR tablet Take 650 mg by mouth every 8 (eight) hours as needed (for osteoarthritis), Historical Med      allopurinol (ZYLOPRIM) 100 mg tablet Take 1 tablet (100 mg total) by mouth daily, Starting Mon 9/30/2019, Normal      benzonatate (TESSALON PERLES) 100 mg capsule Take 1 capsule (100 mg total) by mouth every 8 (eight) hours, Starting Sat 7/6/2024, Normal      DULoxetine (CYMBALTA) 20 mg capsule Take 1 capsule (20 mg total) by mouth daily, Starting Wed 8/15/2018, Normal      furosemide (LASIX) 40 mg tablet Take 1.5 tablets (60 mg total) by mouth daily, Starting Tue 6/4/2024, Until Thu 7/4/2024, No Print      gabapentin (NEURONTIN) 100 mg capsule Take 1 capsule (100 mg total) by mouth 3 (three) times a day, Starting Thu 8/10/2023, Until Tue 6/4/2024, Normal      iron polysaccharides (Ferrex 150) 150 mg capsule Take 150 mg by mouth 2 (two) times a day. Indications: Anemia From Inadequate Iron in the Body, Historical Med      levothyroxine 125 mcg tablet Take 137 mcg by mouth daily, Historical Med      midodrine (PROAMATINE) 2.5 mg tablet Take 1 tablet (2.5 mg total) by mouth 3 (three) times a day before meals, Starting Mon 8/28/2023, Until Tue 6/4/2024, Normal      nystatin (MYCOSTATIN) powder Apply topically 2 (two) times a day, Starting Mon 9/18/2023, Normal      pantoprazole (PROTONIX) 40 mg tablet Take 1 tablet (40 mg total) by mouth daily in the early morning, Starting Sat 1/20/2024, Until Mon 2/19/2024, Normal      triamcinolone (KENALOG)  0.5 % cream Apply 1 Application topically as needed for rash or irritation, Historical Med      warfarin (Coumadin) 5 mg tablet Take 1 tablet (5 mg total) by mouth daily, Starting Tue 5/28/2024, Until Thu 6/27/2024, Normal           No discharge procedures on file.     Thiago Lal PA-C  09/20/24 1800

## 2024-09-20 NOTE — DISCHARGE INSTRUCTIONS
Immediately return to the emergency room if you experience any new or worsening symptoms or if the symptoms are lasting longer than expected.     Please follow-up with your family doctor to discuss your ER visit today. Please continue with your medications as prescribed.

## 2024-09-21 LAB
ATRIAL RATE: 58 BPM
ATRIAL RATE: 66 BPM
QRS AXIS: -3 DEGREES
QRS AXIS: 10 DEGREES
QRSD INTERVAL: 74 MS
QRSD INTERVAL: 78 MS
QT INTERVAL: 372 MS
QT INTERVAL: 394 MS
QTC INTERVAL: 394 MS
QTC INTERVAL: 398 MS
T WAVE AXIS: 199 DEGREES
T WAVE AXIS: 204 DEGREES
VENTRICULAR RATE: 60 BPM
VENTRICULAR RATE: 69 BPM

## 2024-09-21 PROCEDURE — 93010 ELECTROCARDIOGRAM REPORT: CPT | Performed by: INTERNAL MEDICINE

## 2024-09-26 LAB
INR PPP: 2.6
PROTHROMBIN TIME: 27.6 SEC (ref 12–14.6)

## 2024-09-27 ENCOUNTER — ANTICOAG VISIT (OUTPATIENT)
Dept: CARDIOLOGY CLINIC | Facility: CLINIC | Age: 74
End: 2024-09-27
Payer: MEDICARE

## 2024-09-27 DIAGNOSIS — Z79.01 LONG TERM (CURRENT) USE OF ANTICOAGULANTS: Primary | ICD-10-CM

## 2024-09-27 DIAGNOSIS — I48.11 LONGSTANDING PERSISTENT ATRIAL FIBRILLATION (HCC): ICD-10-CM

## 2024-09-27 PROCEDURE — 93793 ANTICOAG MGMT PT WARFARIN: CPT | Performed by: NURSE PRACTITIONER

## 2024-09-27 NOTE — PATIENT INSTRUCTIONS
INR received from lab and reviewed.    No changes needed; continue current dosing schedule.  Recheck INR in one month-results and instructions given to pt by phone  ORACIO Ty

## 2024-10-15 ENCOUNTER — HOSPITAL ENCOUNTER (OUTPATIENT)
Dept: ULTRASOUND IMAGING | Facility: HOSPITAL | Age: 74
Discharge: HOME | End: 2024-10-15
Attending: INTERNAL MEDICINE
Payer: MEDICARE

## 2024-10-15 ENCOUNTER — HOSPITAL ENCOUNTER (EMERGENCY)
Facility: HOSPITAL | Age: 74
Discharge: HOME | End: 2024-10-16
Attending: EMERGENCY MEDICINE
Payer: MEDICARE

## 2024-10-15 VITALS
RESPIRATION RATE: 18 BRPM | SYSTOLIC BLOOD PRESSURE: 118 MMHG | OXYGEN SATURATION: 97 % | HEART RATE: 67 BPM | DIASTOLIC BLOOD PRESSURE: 56 MMHG | TEMPERATURE: 97.8 F

## 2024-10-15 DIAGNOSIS — N63.0 BREAST LUMP IN FEMALE: ICD-10-CM

## 2024-10-15 DIAGNOSIS — S31.119A LACERATION OF ABDOMINAL WALL, INITIAL ENCOUNTER: Primary | ICD-10-CM

## 2024-10-15 DIAGNOSIS — N63.0 SWELLING OF BREAST: ICD-10-CM

## 2024-10-15 PROCEDURE — 99284 EMERGENCY DEPT VISIT MOD MDM: CPT | Performed by: EMERGENCY MEDICINE

## 2024-10-15 PROCEDURE — 12001 RPR S/N/AX/GEN/TRNK 2.5CM/<: CPT | Performed by: EMERGENCY MEDICINE

## 2024-10-15 PROCEDURE — 76642 ULTRASOUND BREAST LIMITED: CPT

## 2024-10-15 PROCEDURE — 99283 EMERGENCY DEPT VISIT LOW MDM: CPT

## 2024-10-15 RX ORDER — LIDOCAINE HYDROCHLORIDE 20 MG/ML
5 INJECTION, SOLUTION EPIDURAL; INFILTRATION; INTRACAUDAL; PERINEURAL ONCE
Status: COMPLETED | OUTPATIENT
Start: 2024-10-15 | End: 2024-10-15

## 2024-10-15 RX ADMIN — LIDOCAINE HYDROCHLORIDE 5 ML: 20 INJECTION, SOLUTION EPIDURAL; INFILTRATION; INTRACAUDAL; PERINEURAL at 19:58

## 2024-10-16 RX ORDER — DOXYCYCLINE 100 MG/1
100 CAPSULE ORAL 2 TIMES DAILY
Qty: 14 CAPSULE | Refills: 0 | Status: SHIPPED | OUTPATIENT
Start: 2024-10-16 | End: 2024-10-23

## 2024-10-16 RX ORDER — DOXYCYCLINE 100 MG/1
100 CAPSULE ORAL ONCE
Status: COMPLETED | OUTPATIENT
Start: 2024-10-16 | End: 2024-10-16

## 2024-10-16 RX ADMIN — DOXYCYCLINE HYCLATE 100 MG: 100 CAPSULE ORAL at 09:05

## 2024-10-16 NOTE — ED PROVIDER NOTES
Time reflects when diagnosis was documented in both MDM as applicable and the Disposition within this note       Time User Action Codes Description Comment    10/15/2024  8:42 PM Aleksey Mcgrath Add [S31.119A] Laceration of abdominal wall, initial encounter           ED Disposition       ED Disposition   Discharge    Condition   --    Date/Time   Wed Oct 16, 2024  9:07 AM    Comment   Zee Kirk discharge to home/self care.                   Assessment & Plan       Medical Decision Making  Successful repair with good cosmesis and good hemostasis.  Wound washed out prior to suturing. Lidocaine administration was successful in achieving analgesia.  Patient tolerated the procedure well.   Neurovascular intact with no signs of tendon or ligament damage.  Risk for infection so started on antibiotics.  Discussed warning signs and symptoms with the patient as well as when to return to the emergency department versus follow up with [facility]. Patient states understanding and agreement with the plan.  Patient is unable to be safely brought home until morning as she does not have a visiting care nurse and she is not able to get into her bed.  She will be transported from our facility first thing in the morning.  This note was completed using dictation software.       Problems Addressed:  Laceration of abdominal wall, initial encounter: acute illness or injury    Amount and/or Complexity of Data Reviewed  Independent Historian: EMS  External Data Reviewed: notes.    Risk  OTC drugs.  Prescription drug management.             Medications   lidocaine (PF) (XYLOCAINE-MPF) 2 % injection 5 mL (5 mL Infiltration Given 10/15/24 1958)   doxycycline hyclate (VIBRAMYCIN) capsule 100 mg (100 mg Oral Given 10/16/24 0905)       ED Risk Strat Scores                                               History of Present Illness       Chief Complaint   Patient presents with    Wound Check     Bilateral thigh wounds after getting it jammed in  the doorway when getting out the house; happened today       Past Medical History:   Diagnosis Date    Atrial fibrillation (HCC)     Bladder stone     C. difficile colitis     Cellulitis     CHF (congestive heart failure) (HCC)     Depression with anxiety     Disease of thyroid gland     Diverticulitis     Enterocolitis     History of abnormal cervical Pap smear     Kidney stone     Lymphedema     Menopause     Age 50    Morbid obesity with BMI of 70 and over, adult (HCC)     MRSA (methicillin resistant Staphylococcus aureus)     abdominal wound    Osteoarthritis     Phlebitis     left lower leg    Spondylolysis       Past Surgical History:   Procedure Laterality Date    APPENDECTOMY      CARPAL TUNNEL RELEASE      CHOLECYSTECTOMY      CYSTOSCOPY      stent    FOOT SURGERY      bone spur    KNEE SURGERY      WISDOM TOOTH EXTRACTION        Family History   Problem Relation Age of Onset    Heart failure Mother     Heart disease Mother     Lymphoma Mother     Seizures Mother     Kidney disease Father     Heart disease Father     Heart failure Father     Diabetes type II Father     Diabetes type II Sister     Diabetes type II Brother     Uterine cancer Paternal Aunt     Breast cancer Neg Hx     Colon cancer Neg Hx     Ovarian cancer Neg Hx       Social History     Tobacco Use    Smoking status: Former     Current packs/day: 0.00     Average packs/day: 5.0 packs/day for 3.0 years (15.0 ttl pk-yrs)     Types: Cigarettes     Start date: 1967     Quit date: 1970     Years since quittin.3    Smokeless tobacco: Never    Tobacco comments:     5 packs/day until  (age 17-19) - As per [employer]    Vaping Use    Vaping status: Never Used   Substance Use Topics    Alcohol use: Not Currently    Drug use: Not Currently     Types: Marijuana     Comment: As a teen - As per [employer]       E-Cigarette/Vaping    E-Cigarette Use Never User       E-Cigarette/Vaping Substances    Nicotine No     THC No     CBD No      Flavoring No     Other No     Unknown No       I have reviewed and agree with the history as documented.     Patient was being discharged to her home today from a facility.  Patient is unable to ambulate and was being carried in on a stretcher.  Patient's side was scraped alongside the door resulting in 2 separate abrasions/lacerations.  Had some persistent oozing of blood from these wounds so she was brought back to the hospital.  Up-to-date on tetanus.  States she otherwise feels at her baseline right now.        Review of Systems   Unable to perform ROS: Other           Objective       ED Triage Vitals [10/1950]   Temperature Pulse Blood Pressure Respirations SpO2 Patient Position - Orthostatic VS   97.8 °F (36.6 °C) 67 118/56 18 97 % --      Temp src Heart Rate Source BP Location FiO2 (%) Pain Score    -- -- -- -- 2      Vitals      Date and Time Temp Pulse SpO2 Resp BP Pain Score FACES Pain Rating User   10/1950 97.8 °F (36.6 °C) 67 97 % 18 118/56 2 -- AF            Physical Exam  Constitutional:       Appearance: She is well-developed. She is obese.   HENT:      Head: Normocephalic and atraumatic.      Right Ear: External ear normal.      Left Ear: External ear normal.   Eyes:      Conjunctiva/sclera: Conjunctivae normal.      Pupils: Pupils are equal, round, and reactive to light.   Cardiovascular:      Rate and Rhythm: Normal rate and regular rhythm.      Heart sounds: Normal heart sounds.   Pulmonary:      Effort: Pulmonary effort is normal. No respiratory distress.      Breath sounds: Normal breath sounds.   Abdominal:      General: Bowel sounds are normal.      Palpations: Abdomen is soft.   Musculoskeletal:         General: Normal range of motion.      Cervical back: Normal range of motion and neck supple.   Skin:     General: Skin is warm.      Capillary Refill: Capillary refill takes less than 2 seconds.      Comments: Abrasions/laceration each approximately 3 cm in length 1 cm in width  "  Neurological:      Mental Status: She is alert and oriented to person, place, and time.   Psychiatric:         Behavior: Behavior normal.         Results Reviewed       None            No orders to display       Universal Protocol:  Consent: Verbal consent obtained.  Risks and benefits: risks, benefits and alternatives were discussed  Consent given by: patient  Time out: Immediately prior to procedure a \"time out\" was called to verify the correct patient, procedure, equipment, support staff and site/side marked as required.  Patient understanding: patient states understanding of the procedure being performed  Radiology Images displayed and confirmed. If images not available, report reviewed: imaging studies available  Patient identity confirmed: verbally with patient  Laceration repair    Date/Time: 10/16/2024 3:31 PM    Performed by: Aleksey Mcgrath MD  Authorized by: Aleksey Mcgrath MD  Foreign bodies: no foreign bodies  Tendon involvement: none  Nerve involvement: none  Vascular damage: no    Anesthesia:  Local Anesthetic: lidocaine 2% without epinephrine      Procedure Details:  Amount of cleaning: standard  Number of sutures: 2  Technique: simple  Approximation: close  Approximation difficulty: simple  Dressing: 4x4 sterile gauze  Patient tolerance: patient tolerated the procedure well with no immediate complications          ED Medication and Procedure Management   Prior to Admission Medications   Prescriptions Last Dose Informant Patient Reported? Taking?   DULoxetine (CYMBALTA) 20 mg capsule  Self No No   Sig: Take 1 capsule (20 mg total) by mouth daily   Patient taking differently: Take 20 mg by mouth daily Every other day   acetaminophen (TYLENOL) 650 mg CR tablet  Self Yes No   Sig: Take 650 mg by mouth every 8 (eight) hours as needed (for osteoarthritis)   allopurinol (ZYLOPRIM) 100 mg tablet  Self No No   Sig: Take 1 tablet (100 mg total) by mouth daily   benzonatate (TESSALON PERLES) 100 mg capsule "   No No   Sig: Take 1 capsule (100 mg total) by mouth every 8 (eight) hours   furosemide (LASIX) 40 mg tablet   No No   Sig: Take 1.5 tablets (60 mg total) by mouth daily   gabapentin (NEURONTIN) 100 mg capsule   No No   Sig: Take 1 capsule (100 mg total) by mouth 3 (three) times a day   iron polysaccharides (Ferrex 150) 150 mg capsule   Yes No   Sig: Take 150 mg by mouth 2 (two) times a day. Indications: Anemia From Inadequate Iron in the Body   Patient not taking: Reported on 5/23/2024   levothyroxine 125 mcg tablet  Self Yes No   Sig: Take 137 mcg by mouth daily   midodrine (PROAMATINE) 2.5 mg tablet   No No   Sig: Take 1 tablet (2.5 mg total) by mouth 3 (three) times a day before meals   Patient taking differently: Take 2.5 mg by mouth 3 (three) times a day as needed   nystatin (MYCOSTATIN) powder   No No   Sig: Apply topically 2 (two) times a day   Patient not taking: Reported on 5/23/2024   pantoprazole (PROTONIX) 40 mg tablet   No No   Sig: Take 1 tablet (40 mg total) by mouth daily in the early morning   Patient not taking: Reported on 5/23/2024   triamcinolone (KENALOG) 0.5 % cream   Yes No   Sig: Apply 1 Application topically as needed for rash or irritation   warfarin (Coumadin) 5 mg tablet   No No   Sig: Take 1 tablet (5 mg total) by mouth daily   Patient taking differently: Take 5 mg by mouth daily 2 mg M,W,F SAT, SUN  4 mg T,TH,      Facility-Administered Medications: None     Discharge Medication List as of 10/16/2024  9:04 AM        START taking these medications    Details   doxycycline hyclate (VIBRAMYCIN) 100 mg capsule Take 1 capsule (100 mg total) by mouth 2 (two) times a day for 7 days, Starting Wed 10/16/2024, Until Wed 10/23/2024, Normal           CONTINUE these medications which have NOT CHANGED    Details   acetaminophen (TYLENOL) 650 mg CR tablet Take 650 mg by mouth every 8 (eight) hours as needed (for osteoarthritis), Historical Med      allopurinol (ZYLOPRIM) 100 mg tablet Take 1 tablet  (100 mg total) by mouth daily, Starting Mon 9/30/2019, Normal      benzonatate (TESSALON PERLES) 100 mg capsule Take 1 capsule (100 mg total) by mouth every 8 (eight) hours, Starting Sat 7/6/2024, Normal      DULoxetine (CYMBALTA) 20 mg capsule Take 1 capsule (20 mg total) by mouth daily, Starting Wed 8/15/2018, Normal      furosemide (LASIX) 40 mg tablet Take 1.5 tablets (60 mg total) by mouth daily, Starting Tue 6/4/2024, Until Thu 7/4/2024, No Print      gabapentin (NEURONTIN) 100 mg capsule Take 1 capsule (100 mg total) by mouth 3 (three) times a day, Starting Thu 8/10/2023, Until Tue 6/4/2024, Normal      iron polysaccharides (Ferrex 150) 150 mg capsule Take 150 mg by mouth 2 (two) times a day. Indications: Anemia From Inadequate Iron in the Body, Historical Med      levothyroxine 125 mcg tablet Take 137 mcg by mouth daily, Historical Med      midodrine (PROAMATINE) 2.5 mg tablet Take 1 tablet (2.5 mg total) by mouth 3 (three) times a day before meals, Starting Mon 8/28/2023, Until Tue 6/4/2024, Normal      nystatin (MYCOSTATIN) powder Apply topically 2 (two) times a day, Starting Mon 9/18/2023, Normal      pantoprazole (PROTONIX) 40 mg tablet Take 1 tablet (40 mg total) by mouth daily in the early morning, Starting Sat 1/20/2024, Until Mon 2/19/2024, Normal      triamcinolone (KENALOG) 0.5 % cream Apply 1 Application topically as needed for rash or irritation, Historical Med      warfarin (Coumadin) 5 mg tablet Take 1 tablet (5 mg total) by mouth daily, Starting Tue 5/28/2024, Until Thu 6/27/2024, Normal           No discharge procedures on file.  ED SEPSIS DOCUMENTATION   Time reflects when diagnosis was documented in both MDM as applicable and the Disposition within this note       Time User Action Codes Description Comment    10/15/2024  8:42 PM Aleksey Mcgrath Add [S31.119A] Laceration of abdominal wall, initial encounter                  Aleksye Mcgrath MD  10/16/24 [id number]

## 2024-10-24 LAB
INR PPP: 3.7
PROTHROMBIN TIME: 36.1 SEC (ref 12–14.6)

## 2024-10-25 ENCOUNTER — ANTICOAG VISIT (OUTPATIENT)
Dept: CARDIOLOGY CLINIC | Facility: CLINIC | Age: 74
End: 2024-10-25
Payer: MEDICARE

## 2024-10-25 DIAGNOSIS — I48.11 LONGSTANDING PERSISTENT ATRIAL FIBRILLATION (HCC): Primary | ICD-10-CM

## 2024-10-25 DIAGNOSIS — Z79.01 LONG TERM (CURRENT) USE OF ANTICOAGULANTS: ICD-10-CM

## 2024-10-25 PROCEDURE — 93793 ANTICOAG MGMT PT WARFARIN: CPT | Performed by: PHYSICIAN ASSISTANT

## 2024-10-25 NOTE — PATIENT INSTRUCTIONS
Spoke with Patient: No changes in medication health or diet. No missed or extra doses. No s/s of bleeding TIA or CVA. No new ABX, NSAIDs OTC meds, or supplements. Dose as instructed and recheck in two weeks.   Jessica Mata PA-C

## 2024-11-06 ENCOUNTER — HOSPITAL ENCOUNTER (INPATIENT)
Facility: HOSPITAL | Age: 74
LOS: 4 days | Discharge: HOME HEALTH | DRG: 291 | End: 2024-11-10
Attending: INTERNAL MEDICINE | Admitting: FAMILY MEDICINE
Payer: MEDICARE

## 2024-11-06 ENCOUNTER — APPOINTMENT (EMERGENCY)
Dept: RADIOLOGY | Facility: HOSPITAL | Age: 74
DRG: 291 | End: 2024-11-06
Payer: MEDICARE

## 2024-11-06 DIAGNOSIS — I50.9 ACUTE ON CHRONIC CONGESTIVE HEART FAILURE (HCC): ICD-10-CM

## 2024-11-06 DIAGNOSIS — Z79.01 LONG TERM (CURRENT) USE OF ANTICOAGULANTS: ICD-10-CM

## 2024-11-06 DIAGNOSIS — I50.33 ACUTE ON CHRONIC HEART FAILURE WITH PRESERVED EJECTION FRACTION (HCC): ICD-10-CM

## 2024-11-06 DIAGNOSIS — M79.3 PANNICULITIS: ICD-10-CM

## 2024-11-06 DIAGNOSIS — E11.9 TYPE 2 DIABETES MELLITUS WITHOUT COMPLICATION, WITHOUT LONG-TERM CURRENT USE OF INSULIN (HCC): ICD-10-CM

## 2024-11-06 DIAGNOSIS — J96.91 HYPOXIC RESPIRATORY FAILURE (HCC): Primary | ICD-10-CM

## 2024-11-06 PROBLEM — I89.0 LYMPHEDEMA OF EXTREMITY: Chronic | Status: RESOLVED | Noted: 2021-08-19 | Resolved: 2024-11-06

## 2024-11-06 PROBLEM — R09.02 HYPOXIC EPISODE: Status: ACTIVE | Noted: 2024-11-06

## 2024-11-06 PROBLEM — I50.31 ACUTE HEART FAILURE WITH PRESERVED EJECTION FRACTION (HFPEF) (HCC): Status: ACTIVE | Noted: 2024-11-06

## 2024-11-06 LAB
ALBUMIN SERPL BCG-MCNC: 2.6 G/DL (ref 3.5–5)
ALP SERPL-CCNC: 86 U/L (ref 34–104)
ALT SERPL W P-5'-P-CCNC: 8 U/L (ref 7–52)
ANION GAP SERPL CALCULATED.3IONS-SCNC: 6 MMOL/L (ref 4–13)
AST SERPL W P-5'-P-CCNC: 13 U/L (ref 13–39)
BASOPHILS # BLD AUTO: 0.06 THOUSANDS/ÂΜL (ref 0–0.1)
BASOPHILS NFR BLD AUTO: 0 % (ref 0–1)
BILIRUB SERPL-MCNC: 0.46 MG/DL (ref 0.2–1)
BNP SERPL-MCNC: 197 PG/ML (ref 0–100)
BUN SERPL-MCNC: 31 MG/DL (ref 5–25)
CALCIUM ALBUM COR SERPL-MCNC: 9 MG/DL (ref 8.3–10.1)
CALCIUM SERPL-MCNC: 7.9 MG/DL (ref 8.4–10.2)
CHLORIDE SERPL-SCNC: 97 MMOL/L (ref 96–108)
CO2 SERPL-SCNC: 32 MMOL/L (ref 21–32)
CREAT SERPL-MCNC: 1.22 MG/DL (ref 0.6–1.3)
EOSINOPHIL # BLD AUTO: 0.37 THOUSAND/ÂΜL (ref 0–0.61)
EOSINOPHIL NFR BLD AUTO: 2 % (ref 0–6)
ERYTHROCYTE [DISTWIDTH] IN BLOOD BY AUTOMATED COUNT: 16.8 % (ref 11.6–15.1)
GFR SERPL CREATININE-BSD FRML MDRD: 44 ML/MIN/1.73SQ M
GLUCOSE SERPL-MCNC: 167 MG/DL (ref 65–140)
GLUCOSE SERPL-MCNC: 175 MG/DL (ref 65–140)
HCT VFR BLD AUTO: 35.9 % (ref 34.8–46.1)
HGB BLD-MCNC: 10.4 G/DL (ref 11.5–15.4)
IMM GRANULOCYTES # BLD AUTO: 0.09 THOUSAND/UL (ref 0–0.2)
IMM GRANULOCYTES NFR BLD AUTO: 1 % (ref 0–2)
INR PPP: 2.34 (ref 0.85–1.19)
LYMPHOCYTES # BLD AUTO: 0.92 THOUSANDS/ÂΜL (ref 0.6–4.47)
LYMPHOCYTES NFR BLD AUTO: 6 % (ref 14–44)
MAGNESIUM SERPL-MCNC: 1.9 MG/DL (ref 1.9–2.7)
MCH RBC QN AUTO: 25.4 PG (ref 26.8–34.3)
MCHC RBC AUTO-ENTMCNC: 29 G/DL (ref 31.4–37.4)
MCV RBC AUTO: 88 FL (ref 82–98)
MONOCYTES # BLD AUTO: 0.56 THOUSAND/ÂΜL (ref 0.17–1.22)
MONOCYTES NFR BLD AUTO: 4 % (ref 4–12)
NEUTROPHILS # BLD AUTO: 13.97 THOUSANDS/ÂΜL (ref 1.85–7.62)
NEUTS SEG NFR BLD AUTO: 87 % (ref 43–75)
NRBC BLD AUTO-RTO: 0 /100 WBCS
PLATELET # BLD AUTO: 437 THOUSANDS/UL (ref 149–390)
PMV BLD AUTO: 8.2 FL (ref 8.9–12.7)
POTASSIUM SERPL-SCNC: 3.8 MMOL/L (ref 3.5–5.3)
PROCALCITONIN SERPL-MCNC: 0.09 NG/ML
PROT SERPL-MCNC: 7.2 G/DL (ref 6.4–8.4)
PROTHROMBIN TIME: 25.9 SECONDS (ref 12.3–15)
RBC # BLD AUTO: 4.1 MILLION/UL (ref 3.81–5.12)
SODIUM SERPL-SCNC: 135 MMOL/L (ref 135–147)
WBC # BLD AUTO: 15.97 THOUSAND/UL (ref 4.31–10.16)

## 2024-11-06 PROCEDURE — 99285 EMERGENCY DEPT VISIT HI MDM: CPT

## 2024-11-06 PROCEDURE — 84145 PROCALCITONIN (PCT): CPT | Performed by: FAMILY MEDICINE

## 2024-11-06 PROCEDURE — 83880 ASSAY OF NATRIURETIC PEPTIDE: CPT | Performed by: INTERNAL MEDICINE

## 2024-11-06 PROCEDURE — 99223 1ST HOSP IP/OBS HIGH 75: CPT | Performed by: FAMILY MEDICINE

## 2024-11-06 PROCEDURE — 36415 COLL VENOUS BLD VENIPUNCTURE: CPT | Performed by: INTERNAL MEDICINE

## 2024-11-06 PROCEDURE — 99285 EMERGENCY DEPT VISIT HI MDM: CPT | Performed by: INTERNAL MEDICINE

## 2024-11-06 PROCEDURE — 82948 REAGENT STRIP/BLOOD GLUCOSE: CPT

## 2024-11-06 PROCEDURE — 83036 HEMOGLOBIN GLYCOSYLATED A1C: CPT | Performed by: FAMILY MEDICINE

## 2024-11-06 PROCEDURE — 85610 PROTHROMBIN TIME: CPT | Performed by: INTERNAL MEDICINE

## 2024-11-06 PROCEDURE — 93005 ELECTROCARDIOGRAM TRACING: CPT

## 2024-11-06 PROCEDURE — 85025 COMPLETE CBC W/AUTO DIFF WBC: CPT | Performed by: INTERNAL MEDICINE

## 2024-11-06 PROCEDURE — 80053 COMPREHEN METABOLIC PANEL: CPT | Performed by: INTERNAL MEDICINE

## 2024-11-06 PROCEDURE — 71045 X-RAY EXAM CHEST 1 VIEW: CPT

## 2024-11-06 PROCEDURE — 83735 ASSAY OF MAGNESIUM: CPT | Performed by: INTERNAL MEDICINE

## 2024-11-06 RX ORDER — NYSTATIN 100000 [USP'U]/G
POWDER TOPICAL 2 TIMES DAILY
Status: DISCONTINUED | OUTPATIENT
Start: 2024-11-06 | End: 2024-11-07

## 2024-11-06 RX ORDER — GABAPENTIN 100 MG/1
100 CAPSULE ORAL 3 TIMES DAILY
Status: DISCONTINUED | OUTPATIENT
Start: 2024-11-06 | End: 2024-11-07

## 2024-11-06 RX ORDER — TRIAMCINOLONE ACETONIDE 5 MG/G
1 CREAM TOPICAL 3 TIMES DAILY
Status: DISCONTINUED | OUTPATIENT
Start: 2024-11-06 | End: 2024-11-10 | Stop reason: HOSPADM

## 2024-11-06 RX ORDER — CEFEPIME HYDROCHLORIDE 2 G/50ML
2000 INJECTION, SOLUTION INTRAVENOUS ONCE
Status: COMPLETED | OUTPATIENT
Start: 2024-11-06 | End: 2024-11-07

## 2024-11-06 RX ORDER — HYDROMORPHONE HCL/PF 1 MG/ML
0.5 SYRINGE (ML) INJECTION
Status: DISCONTINUED | OUTPATIENT
Start: 2024-11-06 | End: 2024-11-10 | Stop reason: HOSPADM

## 2024-11-06 RX ORDER — DULOXETIN HYDROCHLORIDE 20 MG/1
20 CAPSULE, DELAYED RELEASE ORAL DAILY
Status: DISCONTINUED | OUTPATIENT
Start: 2024-11-07 | End: 2024-11-10 | Stop reason: HOSPADM

## 2024-11-06 RX ORDER — MIDODRINE HYDROCHLORIDE 5 MG/1
2.5 TABLET ORAL
Status: DISCONTINUED | OUTPATIENT
Start: 2024-11-07 | End: 2024-11-07

## 2024-11-06 RX ORDER — INSULIN LISPRO 100 [IU]/ML
4-20 INJECTION, SOLUTION INTRAVENOUS; SUBCUTANEOUS
Status: DISCONTINUED | OUTPATIENT
Start: 2024-11-06 | End: 2024-11-10 | Stop reason: HOSPADM

## 2024-11-06 RX ORDER — FUROSEMIDE 10 MG/ML
50 INJECTION INTRAMUSCULAR; INTRAVENOUS 2 TIMES DAILY
Status: DISCONTINUED | OUTPATIENT
Start: 2024-11-06 | End: 2024-11-07

## 2024-11-06 RX ORDER — ACETAMINOPHEN 325 MG/1
650 TABLET ORAL EVERY 4 HOURS PRN
Status: DISCONTINUED | OUTPATIENT
Start: 2024-11-06 | End: 2024-11-10 | Stop reason: HOSPADM

## 2024-11-06 RX ORDER — ALLOPURINOL 100 MG/1
100 TABLET ORAL DAILY
Status: DISCONTINUED | OUTPATIENT
Start: 2024-11-07 | End: 2024-11-10 | Stop reason: HOSPADM

## 2024-11-06 RX ORDER — OXYCODONE HYDROCHLORIDE 10 MG/1
10 TABLET ORAL EVERY 4 HOURS PRN
Status: DISCONTINUED | OUTPATIENT
Start: 2024-11-06 | End: 2024-11-10 | Stop reason: HOSPADM

## 2024-11-06 RX ORDER — INSULIN LISPRO 100 [IU]/ML
4-20 INJECTION, SOLUTION INTRAVENOUS; SUBCUTANEOUS
Status: DISCONTINUED | OUTPATIENT
Start: 2024-11-07 | End: 2024-11-10 | Stop reason: HOSPADM

## 2024-11-06 RX ORDER — OXYCODONE HYDROCHLORIDE 5 MG/1
5 TABLET ORAL EVERY 4 HOURS PRN
Status: DISCONTINUED | OUTPATIENT
Start: 2024-11-06 | End: 2024-11-10 | Stop reason: HOSPADM

## 2024-11-06 RX ADMIN — NYSTATIN: 100000 POWDER TOPICAL at 21:46

## 2024-11-06 RX ADMIN — VANCOMYCIN HYDROCHLORIDE 2000 MG: 1 INJECTION, POWDER, LYOPHILIZED, FOR SOLUTION INTRAVENOUS at 22:17

## 2024-11-06 RX ADMIN — GABAPENTIN 100 MG: 100 CAPSULE ORAL at 21:42

## 2024-11-06 RX ADMIN — CEFEPIME HYDROCHLORIDE 2000 MG: 2 INJECTION, SOLUTION INTRAVENOUS at 20:54

## 2024-11-07 ENCOUNTER — APPOINTMENT (INPATIENT)
Dept: NON INVASIVE DIAGNOSTICS | Facility: HOSPITAL | Age: 74
DRG: 291 | End: 2024-11-07
Payer: MEDICARE

## 2024-11-07 LAB
ANION GAP SERPL CALCULATED.3IONS-SCNC: 5 MMOL/L (ref 4–13)
AORTIC ROOT: 3 CM
AORTIC VALVE MEAN VELOCITY: 8.6 M/S
APICAL FOUR CHAMBER EJECTION FRACTION: 43 %
ASCENDING AORTA: 3.9 CM
ATRIAL RATE: 107 BPM
AV AREA BY CONTINUOUS VTI: 2 CM2
AV AREA PEAK VELOCITY: 2.3 CM2
AV LVOT MEAN GRADIENT: 2 MMHG
AV LVOT PEAK GRADIENT: 3 MMHG
AV MEAN GRADIENT: 3 MMHG
AV PEAK GRADIENT: 6 MMHG
AV VALVE AREA: 2.03 CM2
AV VELOCITY RATIO: 0.74
BSA FOR ECHO PROCEDURE: 2.72 M2
BUN SERPL-MCNC: 29 MG/DL (ref 5–25)
CALCIUM SERPL-MCNC: 7.9 MG/DL (ref 8.4–10.2)
CHLORIDE SERPL-SCNC: 99 MMOL/L (ref 96–108)
CO2 SERPL-SCNC: 32 MMOL/L (ref 21–32)
CREAT SERPL-MCNC: 1.09 MG/DL (ref 0.6–1.3)
DOP CALC AO PEAK VEL: 1.21 M/S
DOP CALC AO VTI: 27.41 CM
DOP CALC LVOT AREA: 3.14 CM2
DOP CALC LVOT CARDIAC INDEX: 1.31 L/MIN/M2
DOP CALC LVOT CARDIAC OUTPUT: 3.57 L/MIN
DOP CALC LVOT DIAMETER: 2 CM
DOP CALC LVOT PEAK VEL VTI: 17.68 CM
DOP CALC LVOT PEAK VEL: 0.9 M/S
DOP CALC LVOT STROKE INDEX: 21.3 ML/M2
DOP CALC LVOT STROKE VOLUME: 55.52
ERYTHROCYTE [DISTWIDTH] IN BLOOD BY AUTOMATED COUNT: 16.5 % (ref 11.6–15.1)
EST. AVERAGE GLUCOSE BLD GHB EST-MCNC: 160 MG/DL
FRACTIONAL SHORTENING: 40 (ref 28–44)
GFR SERPL CREATININE-BSD FRML MDRD: 50 ML/MIN/1.73SQ M
GLUCOSE SERPL-MCNC: 106 MG/DL (ref 65–140)
GLUCOSE SERPL-MCNC: 107 MG/DL (ref 65–140)
GLUCOSE SERPL-MCNC: 111 MG/DL (ref 65–140)
GLUCOSE SERPL-MCNC: 152 MG/DL (ref 65–140)
HBA1C MFR BLD: 7.2 %
HCT VFR BLD AUTO: 35.6 % (ref 34.8–46.1)
HGB BLD-MCNC: 10 G/DL (ref 11.5–15.4)
INTERVENTRICULAR SEPTUM IN DIASTOLE (PARASTERNAL SHORT AXIS VIEW): 1 CM
INTERVENTRICULAR SEPTUM: 1 CM (ref 0.6–1.1)
LAAS-AP4: 29.9 CM2
LEFT ATRIUM SIZE: 4.6 CM
LEFT INTERNAL DIMENSION IN SYSTOLE: 3 CM (ref 2.1–4)
LEFT VENTRICULAR INTERNAL DIMENSION IN DIASTOLE: 5 CM (ref 3.5–6)
LEFT VENTRICULAR POSTERIOR WALL IN END DIASTOLE: 0.9 CM
LEFT VENTRICULAR STROKE VOLUME: 85 ML
LVSV (TEICH): 85 ML
MAGNESIUM SERPL-MCNC: 2 MG/DL (ref 1.9–2.7)
MCH RBC QN AUTO: 24.9 PG (ref 26.8–34.3)
MCHC RBC AUTO-ENTMCNC: 28.1 G/DL (ref 31.4–37.4)
MCV RBC AUTO: 89 FL (ref 82–98)
PLATELET # BLD AUTO: 451 THOUSANDS/UL (ref 149–390)
PMV BLD AUTO: 8.5 FL (ref 8.9–12.7)
POTASSIUM SERPL-SCNC: 4 MMOL/L (ref 3.5–5.3)
QRS AXIS: -17 DEGREES
QRSD INTERVAL: 68 MS
QT INTERVAL: 426 MS
QTC INTERVAL: 475 MS
RBC # BLD AUTO: 4.01 MILLION/UL (ref 3.81–5.12)
RIGHT ATRIAL 2D VOLUME: 57 ML
RIGHT ATRIUM AREA SYSTOLE A4C: 20.9 CM2
RIGHT VENTRICLE ID DIMENSION: 4.1 CM
SINOTUBULAR JUNCTION: 2.5 CM
SL CV LV EF: 60
SL CV PED ECHO LEFT VENTRICLE DIASTOLIC VOLUME (MOD BIPLANE) 2D: 120 ML
SL CV PED ECHO LEFT VENTRICLE SYSTOLIC VOLUME (MOD BIPLANE) 2D: 35 ML
SL CV SINUS OF VALSALVA 2D: 3.2 CM
SODIUM SERPL-SCNC: 136 MMOL/L (ref 135–147)
STJ: 2.5 CM
T WAVE AXIS: 195 DEGREES
TR MAX PG: 25 MMHG
TR PEAK VELOCITY: 2.5 M/S
TRICUSPID ANNULAR PLANE SYSTOLIC EXCURSION: 2.4 CM
TRICUSPID VALVE PEAK REGURGITATION VELOCITY: 2.52 M/S
VENTRICULAR RATE: 75 BPM
WBC # BLD AUTO: 13.36 THOUSAND/UL (ref 4.31–10.16)

## 2024-11-07 PROCEDURE — 82948 REAGENT STRIP/BLOOD GLUCOSE: CPT

## 2024-11-07 PROCEDURE — 85027 COMPLETE CBC AUTOMATED: CPT | Performed by: FAMILY MEDICINE

## 2024-11-07 PROCEDURE — 93010 ELECTROCARDIOGRAM REPORT: CPT | Performed by: INTERNAL MEDICINE

## 2024-11-07 PROCEDURE — 93306 TTE W/DOPPLER COMPLETE: CPT

## 2024-11-07 PROCEDURE — 36415 COLL VENOUS BLD VENIPUNCTURE: CPT | Performed by: FAMILY MEDICINE

## 2024-11-07 PROCEDURE — 93306 TTE W/DOPPLER COMPLETE: CPT | Performed by: INTERNAL MEDICINE

## 2024-11-07 PROCEDURE — 83735 ASSAY OF MAGNESIUM: CPT | Performed by: FAMILY MEDICINE

## 2024-11-07 PROCEDURE — 80048 BASIC METABOLIC PNL TOTAL CA: CPT | Performed by: FAMILY MEDICINE

## 2024-11-07 PROCEDURE — 99233 SBSQ HOSP IP/OBS HIGH 50: CPT

## 2024-11-07 RX ORDER — GABAPENTIN 100 MG/1
200 CAPSULE ORAL 3 TIMES DAILY
Status: DISCONTINUED | OUTPATIENT
Start: 2024-11-07 | End: 2024-11-10 | Stop reason: HOSPADM

## 2024-11-07 RX ORDER — ALBUMIN (HUMAN) 12.5 G/50ML
25 SOLUTION INTRAVENOUS ONCE
Status: COMPLETED | OUTPATIENT
Start: 2024-11-07 | End: 2024-11-07

## 2024-11-07 RX ORDER — WARFARIN SODIUM 2 MG/1
4 TABLET ORAL
Status: COMPLETED | OUTPATIENT
Start: 2024-11-07 | End: 2024-11-07

## 2024-11-07 RX ORDER — MIDODRINE HYDROCHLORIDE 5 MG/1
5 TABLET ORAL
Status: DISCONTINUED | OUTPATIENT
Start: 2024-11-07 | End: 2024-11-10 | Stop reason: HOSPADM

## 2024-11-07 RX ORDER — FUROSEMIDE 10 MG/ML
50 INJECTION INTRAMUSCULAR; INTRAVENOUS 2 TIMES DAILY
Status: DISCONTINUED | OUTPATIENT
Start: 2024-11-07 | End: 2024-11-08

## 2024-11-07 RX ADMIN — MIDODRINE HYDROCHLORIDE 2.5 MG: 5 TABLET ORAL at 06:02

## 2024-11-07 RX ADMIN — LEVOTHYROXINE SODIUM 137 MCG: 25 TABLET ORAL at 06:01

## 2024-11-07 RX ADMIN — NYSTATIN 1 APPLICATION: 100000 POWDER TOPICAL at 08:32

## 2024-11-07 RX ADMIN — GABAPENTIN 100 MG: 100 CAPSULE ORAL at 08:32

## 2024-11-07 RX ADMIN — ALLOPURINOL 100 MG: 100 TABLET ORAL at 08:31

## 2024-11-07 RX ADMIN — MIDODRINE HYDROCHLORIDE 5 MG: 5 TABLET ORAL at 18:16

## 2024-11-07 RX ADMIN — FUROSEMIDE 50 MG: 10 INJECTION, SOLUTION INTRAMUSCULAR; INTRAVENOUS at 08:33

## 2024-11-07 RX ADMIN — MIDODRINE HYDROCHLORIDE 5 MG: 5 TABLET ORAL at 11:33

## 2024-11-07 RX ADMIN — ALBUMIN (HUMAN) 25 G: 0.25 INJECTION, SOLUTION INTRAVENOUS at 08:29

## 2024-11-07 RX ADMIN — WARFARIN SODIUM 4 MG: 2 TABLET ORAL at 18:16

## 2024-11-07 RX ADMIN — DULOXETINE 20 MG: 20 CAPSULE, DELAYED RELEASE ORAL at 08:31

## 2024-11-07 RX ADMIN — FUROSEMIDE 50 MG: 10 INJECTION, SOLUTION INTRAMUSCULAR; INTRAVENOUS at 20:33

## 2024-11-07 RX ADMIN — ALBUMIN (HUMAN) 25 G: 0.25 INJECTION, SOLUTION INTRAVENOUS at 18:29

## 2024-11-07 RX ADMIN — GABAPENTIN 200 MG: 100 CAPSULE ORAL at 20:33

## 2024-11-07 NOTE — ASSESSMENT & PLAN NOTE
Wt Readings from Last 3 Encounters:   11/07/24 (!) 198 kg (436 lb)   07/06/24 (!) 196 kg (432 lb 1.6 oz)   06/04/24 (!) 186 kg (410 lb)     Patient presented (11/6) with multiple complaints including generalized fatigue, shortness of breath, nonproductive cough, which has been persistent/progressive over the past several days  Patient mostly bedbound at home with 24-hour caregivers secondary to morbid obesity and multiple medical problems  Reportedly, has been unable to take her diuretic consistently over the past week or so secondary to lower blood pressures which she states is chronic  In ED, patient hemodynamically stable, without SIRS criteria, transient desaturation to 88% requiring 2 L nasal cannula  Patient noted with bibasilar crackles on arrival  Chest x-ray did not appreciate consolidations  Reported weight was up 6 pounds from baseline  Furthermore, patient weight up from dry weight approximately 6 pounds raising concern for exacerbation of underlying HFpEF  Received dose of IV diuretics in the ED and initiated on IV diuretics per CHF algorithm-of note patient did not receive last evening's Lasix due to hypotension  Patient reported she was not taking her outpatient midodrine because her blood pressure was low.  I explained to her that this is why she is on midodrine and patient remarked that she thought it was for high blood pressure.  Increased midodrine dosing to 5 mg 3 times daily with meals today to help support blood pressure so diuresis can be given.  Decreased hold parameters to 100 mg systolic BP  Will also give dose of albumin to help support blood pressure  Continue to monitor fluid status closely  Daily weights  Intake and output monitoring  Currently requiring 6 L nasal cannula oxygen, baseline is no oxygen  Respiratory protocol continue to wean oxygen as able

## 2024-11-07 NOTE — H&P
H&P - Hospitalist   Name: Zee Kirk 74 y.o. female I MRN: [id number]  Unit/Bed#: ED 23 I Date of Admission: 11/6/2024   Date of Service: 11/6/2024 I Hospital Day: 0     Assessment & Plan  Acute on chronic heart failure with preserved ejection fraction (HFpEF) (HCC)  Wt Readings from Last 3 Encounters:   11/06/24 (!) 198 kg (436 lb 1.1 oz)   07/06/24 (!) 196 kg (432 lb 1.6 oz)   06/04/24 (!) 186 kg (410 lb)     Patient presents today (11/6) with multiple complaints including generalized fatigue, shortness of breath, nonproductive cough, which has been persistent/progressive over the past several days  Patient mostly bedbound at home with 24-hour caregivers secondary to morbid obesity and multiple medical problems  Reportedly, has been unable to take her diuretic consistently over the past week or so secondary to lower blood pressures which she states is chronic  Patient denies chest pressure, palpitations, lightheadedness, dizziness, fever, chills, sweats    In ED, patient hemodynamically stable, without SIRS criteria, transient desaturation to 88% requiring 2 L nasal cannula  Patient noted with bibasilar crackles with evidence of pulmonary edema on plain film imaging  Furthermore, patient weight up from dry weight approximately 6 pounds raising concern for exacerbation of underlying HFpEF  Received dose of IV diuretics in the ED  Referred for admission secondary to transient episode of hypoxia with evidence of intrapulmonary edema and volume overload consistent with exacerbation of underlying HFpEF    Plan:  Admit to hospitalist service  Proceed with cautious diuretic therapy  Is to be maintained on 60 mg IV Lasix twice daily at home  Adequate volume unloading challenging the setting of hypotension  Proceed with midodrine 2.5 mg p.o. 3 times daily  Proceed with IV diuretic therapy 50 mg IV Lasix over the next 12 to 24 hours  Strict intake and output  Daily weights  Most recent echocardiogram  reviewed-1/2024-preserved EF with evidence of grade 1 diastolic dysfunction  Is greater than 6 months since last echocardiogram and presentation with volume overload-repeat TTE  Consider cardiology consultation if inadequate volume unloading over the next 24 hours        Hypoxic episode  Patient with apparent desaturation upon EMS arrival-86% requiring 2 L  Upon arrival to ED, trialed on room air-initially saturating in the mid 90s however had transient desaturation to 88% requiring initiation of 2 L nasal cannula  Patient not oxygen dependent at baseline  Likely, multifactorial  Patient with evidence of mild pulmonary edema upon presentation which is likely a significant contributing factor  However, may have some degree of obesity hypoventilation  May require resting and ambulatory pulse oximetry prior to discharge  Continuous pulse oximetry while hospitalized  Panniculitis  Patient with chronic wounds/lymphedema  Patient with complaint of redness and slight pain in the fold/pannus right abdomen  On examination, appears more fungal in nature with satellite lesions however cannot rule out superimposed bacterial infection  Patient without SIRS criteria upon presentation  In ED, received broad-spectrum intravenous antimicrobials-vancomycin/cefepime  Hold off on further antibiotic therapy at this time pending labs/procalcitonin tomorrow    Other specified hypothyroidism  Chronic condition/stable  Continue Synthroid 137 mcg p.o. daily  Mild episode of recurrent major depressive disorder (HCC)  Chronic condition/stable  Continue Cymbalta  Idiopathic chronic 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  Chronic condition/stable  Does not appear to have gouty flare at this juncture  Continue to monitor  Class 3 severe obesity due to excess calories with serious comorbidity and body mass index (BMI) greater than or equal to 70 in adult (HCC)  Chronic condition/stable  Recommend incorporating a more whole foods plant-predominant  diet along with decreasing consumption of red meats and processed foods  Per AHA guidelines, recommend moderate-vigorous intensity exercise for 30 minutes a day for 5 days a week or a total of 150 min/week  Gastroesophageal reflux disease without esophagitis  Chronic condition/stable  Continue PPI  Longstanding persistent atrial fibrillation (HCC)  Chronic condition/stable  Rate controlled and systemically anticoagulated  Therapeutic INR upon presentation  Daily PT/INR-continue Coumadin  Type 2 diabetes mellitus without complication, without long-term current use of insulin (HCC)  Lab Results   Component Value Date    HGBA1C 6.9 (H) 02/29/2024       Recent Labs     11/06/24  2143   POCGLU 175*       Blood Sugar Average: Last 72 hrs:  (P) 175  Patient not maintained on antihyperglycemic's in the outpatient setting  Most recent A1c borderline 6.9-recheck  Maintain on sliding scale insulin coverage with Accu-Cheks per protocol pending A1c  Patient is adamant that she does not have diabetes      VTE Pharmacologic Prophylaxis:   High Risk (Score >/= 5) - Pharmacological DVT Prophylaxis Ordered: warfarin (Coumadin). Sequential Compression Devices Ordered.  Code Status: Level 1 - Full Code   Discussion with family: Patient declined call to .     Anticipated Length of Stay: Patient will be admitted on an inpatient basis with an anticipated length of stay of greater than 2 midnights secondary to acute on chronic HFpEF.    History of Present Illness   Chief Complaint: Shortness of breath, weakness    Zee Kirk is a 74 y.o. female with a PMH of morbid obesity, chronic wounds, hypertension, hyperlipidemia, atrial fibrillation, systemically anticoagulated, HFpEF who presents with multiple complaints most notably generalized weakness, nonproductive cough, shortness of breath over the past week.    Patient notes history of congestive heart failure.  Is to be maintained on 60 mg Lasix twice daily.  Has  not been taking recently secondary to low blood pressures.  Is to be maintained on midodrine 3 times daily with meals.  Over the past week, has developed progressive shortness of breath and nonproductive cough prompting presentation.    Patient denies fever, chills, sweats, chest pressure, palpitations.  In ED, without SIRS criteria, hemodynamically stable.  Had transient desaturation requiring initiation of oxygen therapy.  Referred for admission secondary to acute hypoxemic respiratory failure with index suspicion for exacerbation of underlying HFpEF.  Received diuretic therapy in the ED.    Review of Systems    Historical Information   Past Medical History:   Diagnosis Date    Atrial fibrillation (HCC)     Bladder stone     C. difficile colitis     Cellulitis     CHF (congestive heart failure) ([facility])     Depression with anxiety     Disease of thyroid gland     Diverticulitis     Enterocolitis     History of abnormal cervical Pap smear     Kidney stone     Lymphedema     Menopause     Age 50    Morbid obesity with BMI of 70 and over, adult ([facility])     MRSA (methicillin resistant Staphylococcus aureus)     abdominal wound    Osteoarthritis     Phlebitis     left lower leg    Spondylolysis      Past Surgical History:   Procedure Laterality Date    APPENDECTOMY      CARPAL TUNNEL RELEASE      CHOLECYSTECTOMY      CYSTOSCOPY      stent    FOOT SURGERY      bone spur    KNEE SURGERY      WISDOM TOOTH EXTRACTION       Social History     Tobacco Use    Smoking status: Former     Current packs/day: 0.00     Average packs/day: 5.0 packs/day for 3.0 years (15.0 ttl pk-yrs)     Types: Cigarettes     Start date: 1967     Quit date: 1970     Years since quittin.3    Smokeless tobacco: Never    Tobacco comments:     5 packs/day until  (age 17-19) - As per [employer]    Vaping Use    Vaping status: Never Used   Substance and Sexual Activity    Alcohol use: Not Currently    Drug use: Not Currently     Types:  Marijuana     Comment: As a teen - As per [employer]     Sexual activity: Not Currently     E-Cigarette/Vaping    E-Cigarette Use Never User      E-Cigarette/Vaping Substances    Nicotine No     THC No     CBD No     Flavoring No     Other No     Unknown No        Social History:  Marital Status: Single   Meds/Allergies   I have reviewed home medications with a medical source (PCP, Pharmacy, other).  Prior to Admission medications    Medication Sig Start Date End Date Taking? Authorizing Provider   acetaminophen (TYLENOL) 650 mg CR tablet Take 650 mg by mouth every 8 (eight) hours as needed (for osteoarthritis)    Historical Provider, MD   allopurinol (ZYLOPRIM) 100 mg tablet Take 1 tablet (100 mg total) by mouth daily 9/30/19   Davon Lopez DO   benzonatate (TESSALON PERLES) 100 mg capsule Take 1 capsule (100 mg total) by mouth every 8 (eight) hours 7/6/24   Elba Pena PA-C   DULoxetine (CYMBALTA) 20 mg capsule Take 1 capsule (20 mg total) by mouth daily  Patient taking differently: Take 20 mg by mouth daily Every other day 8/15/18   Davon Lopez DO   furosemide (LASIX) 40 mg tablet Take 1.5 tablets (60 mg total) by mouth daily 6/4/24 7/4/24  Leticia Nagel MD   gabapentin (NEURONTIN) 100 mg capsule Take 1 capsule (100 mg total) by mouth 3 (three) times a day 8/10/23 6/4/24  ORACIO Last   iron polysaccharides (Ferrex 150) 150 mg capsule Take 150 mg by mouth 2 (two) times a day. Indications: Anemia From Inadequate Iron in the Body  Patient not taking: Reported on 5/23/2024    Historical Provider, MD   levothyroxine 125 mcg tablet Take 137 mcg by mouth daily    Historical Provider, MD   midodrine (PROAMATINE) 2.5 mg tablet Take 1 tablet (2.5 mg total) by mouth 3 (three) times a day before meals  Patient taking differently: Take 2.5 mg by mouth 3 (three) times a day as needed 8/28/23 6/4/24  ORACIO Gardner   nystatin (MYCOSTATIN) powder Apply topically 2 (two) times a day  Patient not  taking: Reported on 5/23/2024 9/18/23   Kishan Jones PA-C   pantoprazole (PROTONIX) 40 mg tablet Take 1 tablet (40 mg total) by mouth daily in the early morning  Patient not taking: Reported on 5/23/2024 1/20/24 2/19/24  ORACIO Gardner   triamcinolone (KENALOG) 0.5 % cream Apply 1 Application topically as needed for rash or irritation    Historical Provider, MD   warfarin (Coumadin) 5 mg tablet Take 1 tablet (5 mg total) by mouth daily  Patient taking differently: Take 5 mg by mouth daily 2 mg M,W,F SAT, SUN  4 mg T,TH, 5/28/24 6/27/24  Amaris Cano, DO     Allergies   Allergen Reactions    Augmentin Es-600 [Amoxicillin-Pot Clavulanate] Anaphylaxis and Rash    Bactrim [Sulfamethoxazole-Trimethoprim] Anaphylaxis    Cefazolin Itching and Other (See Comments)     Patient developed itching and difficulty swallowing following IV cefazolin administration at [facility] 7/19/20 which required treatment with benadryl and epinephrine - has tolerated other cephalosporins with different side chains including cefepime, cefuroxime, and cephalexin    Ciprofloxacin Anaphylaxis    Doxycycline Anaphylaxis    Keflex [Cephalexin] Anaphylaxis    Penicillins Anaphylaxis, Rash and Other (See Comments)     Tolerates cefepime (11/18/21)    Other Rash, Irritability and Edema     Patient reports significant reaction to the use of Max Orb in the abdominal folds leading to swelling, excoriation, maceration and weeping of the skin.     Omeprazole GI Intolerance    Pork-Derived Products - Food Allergy Diarrhea    Sulfa Antibiotics Hives    Latex Rash and Edema    Vitamin C [Ascorbate - Food Allergy] Rash       Objective :  Temp:  [97.3 °F (36.3 °C)-98.1 °F (36.7 °C)] 98.1 °F (36.7 °C)  HR:  [71-77] 75  BP: ()/(47-57) 95/47  Resp:  [18-22] 22  SpO2:  [87 %-98 %] 96 %  O2 Device: Nasal cannula  Nasal Cannula O2 Flow Rate (L/min):  [2 L/min] 2 L/min    Physical Exam  Constitutional:       General: She is not in acute distress.      Appearance: She is obese. She is ill-appearing. She is not toxic-appearing or diaphoretic.   HENT:      Head: Normocephalic and atraumatic.      Right Ear: External ear normal.      Left Ear: External ear normal.      Nose: Nose normal.      Mouth/Throat:      Mouth: Mucous membranes are dry.      Pharynx: Oropharynx is clear. No oropharyngeal exudate or posterior oropharyngeal erythema.   Eyes:      General: No scleral icterus.  Cardiovascular:      Rate and Rhythm: Normal rate and regular rhythm.      Pulses: Normal pulses.      Heart sounds: Murmur heard.      No gallop.   Pulmonary:      Effort: Pulmonary effort is normal. No respiratory distress.      Breath sounds: No stridor. Rales present. No wheezing or rhonchi.   Chest:      Chest wall: No tenderness.   Abdominal:      General: Bowel sounds are normal. There is no distension.      Palpations: Abdomen is soft. There is no mass.      Tenderness: There is no abdominal tenderness. There is no guarding or rebound.      Hernia: No hernia is present.   Musculoskeletal:      Cervical back: Normal range of motion. No rigidity.      Right lower leg: Edema present.      Left lower leg: Edema present.   Lymphadenopathy:      Cervical: No cervical adenopathy.   Skin:     General: Skin is warm and moist.      Capillary Refill: Capillary refill takes less than 2 seconds.      Findings: Erythema, lesion, rash and wound present.          Neurological:      General: No focal deficit present.      Mental Status: She is alert and oriented to person, place, and time. Mental status is at baseline.      Cranial Nerves: No cranial nerve deficit.      Motor: Weakness present.   Psychiatric:         Mood and Affect: Mood normal.         Behavior: Behavior normal.         Thought Content: Thought content normal.         Judgment: Judgment normal.          Lines/Drains:            Lab Results: I have reviewed the following results:  Results from last 7 days   Lab Units 11/06/24  1933    WBC Thousand/uL 15.97*   HEMOGLOBIN g/dL 10.4*   HEMATOCRIT % 35.9   PLATELETS Thousands/uL 437*   SEGS PCT % 87*   LYMPHO PCT % 6*   MONO PCT % 4   EOS PCT % 2     Results from last 7 days   Lab Units 11/06/24  1933   SODIUM mmol/L 135   POTASSIUM mmol/L 3.8   CHLORIDE mmol/L 97   CO2 mmol/L 32   BUN mg/dL 31*   CREATININE mg/dL 1.22   ANION GAP mmol/L 6   CALCIUM mg/dL 7.9*   ALBUMIN g/dL 2.6*   TOTAL BILIRUBIN mg/dL 0.46   ALK PHOS U/L 86   ALT U/L 8   AST U/L 13   GLUCOSE RANDOM mg/dL 167*     Results from last 7 days   Lab Units 11/06/24  1933   INR  2.34*     Results from last 7 days   Lab Units 11/06/24  2143   POC GLUCOSE mg/dl 175*     Lab Results   Component Value Date    HGBA1C 6.9 (H) 02/29/2024    HGBA1C 7.1 (H) 08/30/2021    HGBA1C 7.0 (H) 05/28/2021     Results from last 7 days   Lab Units 11/06/24 1933   PROCALCITONIN ng/ml 0.09       Imaging Results Review: I reviewed radiology reports from this admission including: xray(s).  Other Study Results Review: EKG was reviewed.     Administrative Statements   I have spent a total time of 44 minutes in caring for this patient on the day of the visit/encounter including Instructions for management, Risk factor reductions, Counseling / Coordination of care, Documenting in the medical record, Obtaining or reviewing history  , and Communicating with other healthcare professionals .    ** Please Note: This note has been constructed using a voice recognition system. **

## 2024-11-07 NOTE — PLAN OF CARE
Problem: PAIN - ADULT  Goal: Verbalizes/displays adequate comfort level or baseline comfort level  Description: Interventions:  - Encourage patient to monitor pain and request assistance  - Assess pain using appropriate pain scale  - Administer analgesics based on type and severity of pain and evaluate response  - Implement non-pharmacological measures as appropriate and evaluate response  - Consider cultural and social influences on pain and pain management  - Notify physician/advanced practitioner if interventions unsuccessful or patient reports new pain  Outcome: Progressing     Problem: INFECTION - ADULT  Goal: Absence or prevention of progression during hospitalization  Description: INTERVENTIONS:  - Assess and monitor for signs and symptoms of infection  - Monitor lab/diagnostic results  - Monitor all insertion sites, i.e. indwelling lines, tubes, and drains  - Monitor endotracheal if appropriate and nasal secretions for changes in amount and color  - Beaman appropriate cooling/warming therapies per order  - Administer medications as ordered  - Instruct and encourage patient and family to use good hand hygiene technique  - Identify and instruct in appropriate isolation precautions for identified infection/condition  Outcome: Progressing  Goal: Absence of fever/infection during neutropenic period  Description: INTERVENTIONS:  - Monitor WBC    Outcome: Progressing     Problem: SAFETY ADULT  Goal: Patient will remain free of falls  Description: INTERVENTIONS:  - Educate patient/family on patient safety including physical limitations  - Instruct patient to call for assistance with activity   - Consult OT/PT to assist with strengthening/mobility   - Keep Call bell within reach  - Keep bed low and locked with side rails adjusted as appropriate  - Keep care items and personal belongings within reach  - Initiate and maintain comfort rounds  - Make Fall Risk Sign visible to staff  - Offer Toileting every ***  Hours, in advance of need  - Initiate/Maintain ***alarm  - Obtain necessary fall risk management equipment: ***  - Apply yellow socks and bracelet for high fall risk patients  - Consider moving patient to room near nurses station  Outcome: Progressing  Goal: Maintain or return to baseline ADL function  Description: INTERVENTIONS:  -  Assess patient's ability to carry out ADLs; assess patient's baseline for ADL function and identify physical deficits which impact ability to perform ADLs (bathing, care of mouth/teeth, toileting, grooming, dressing, etc.)  - Assess/evaluate cause of self-care deficits   - Assess range of motion  - Assess patient's mobility; develop plan if impaired  - Assess patient's need for assistive devices and provide as appropriate  - Encourage maximum independence but intervene and supervise when necessary  - Involve family in performance of ADLs  - Assess for home care needs following discharge   - Consider OT consult to assist with ADL evaluation and planning for discharge  - Provide patient education as appropriate  Outcome: Progressing  Goal: Maintains/Returns to pre admission functional level  Description: INTERVENTIONS:  - Perform AM-PAC 6 Click Basic Mobility/ Daily Activity assessment daily.  - Set and communicate daily mobility goal to care team and patient/family/caregiver.   - Collaborate with rehabilitation services on mobility goals if consulted  - Perform Range of Motion *** times a day.  - Reposition patient every *** hours.  - Dangle patient *** times a day  - Stand patient *** times a day  - Ambulate patient *** times a day  - Out of bed to chair *** times a day   - Out of bed for meals *** times a day  - Out of bed for toileting  - Record patient progress and toleration of activity level   Outcome: Progressing     Problem: DISCHARGE PLANNING  Goal: Discharge to home or other facility with appropriate resources  Description: INTERVENTIONS:  - Identify barriers to discharge  w/patient and caregiver  - Arrange for needed discharge resources and transportation as appropriate  - Identify discharge learning needs (meds, wound care, etc.)  - Arrange for interpretive services to assist at discharge as needed  - Refer to Case Management Department for coordinating discharge planning if the patient needs post-hospital services based on physician/advanced practitioner order or complex needs related to functional status, cognitive ability, or social support system  Outcome: Progressing     Problem: Knowledge Deficit  Goal: Patient/family/caregiver demonstrates understanding of disease process, treatment plan, medications, and discharge instructions  Description: Complete learning assessment and assess knowledge base.  Interventions:  - Provide teaching at level of understanding  - Provide teaching via preferred learning methods  Outcome: Progressing     Problem: Prexisting or High Potential for Compromised Skin Integrity  Goal: Skin integrity is maintained or improved  Description: INTERVENTIONS:  - Identify patients at risk for skin breakdown  - Assess and monitor skin integrity  - Assess and monitor nutrition and hydration status  - Monitor labs   - Assess for incontinence   - Turn and reposition patient  - Assist with mobility/ambulation  - Relieve pressure over bony prominences  - Avoid friction and shearing  - Provide appropriate hygiene as needed including keeping skin clean and dry  - Evaluate need for skin moisturizer/barrier cream  - Collaborate with interdisciplinary team   - Patient/family teaching  - Consider wound care consult   Outcome: Progressing

## 2024-11-07 NOTE — ASSESSMENT & PLAN NOTE
Patient with apparent desaturation upon EMS arrival-86% requiring 2 L  Upon arrival to ED, trialed on room air-initially saturating in the mid 90s however had transient desaturation to 88% requiring initiation of 2 L nasal cannula  Patient not oxygen dependent at baseline  Likely, multifactorial  Patient with evidence of mild pulmonary edema upon presentation which is likely a significant contributing factor  However, may have some degree of obesity hypoventilation  May require resting and ambulatory pulse oximetry prior to discharge  Continuous pulse oximetry while hospitalized

## 2024-11-07 NOTE — ASSESSMENT & PLAN NOTE
Lab Results   Component Value Date    HGBA1C 6.9 (H) 02/29/2024       Recent Labs     11/06/24  2143   POCGLU 175*       Blood Sugar Average: Last 72 hrs:  (P) 175  Patient not maintained on antihyperglycemic's in the outpatient setting  Most recent A1c borderline 6.9-recheck  Maintain on sliding scale insulin coverage with Accu-Cheks per protocol pending A1c  Patient is adamant that she does not have diabetes

## 2024-11-07 NOTE — ED PROVIDER NOTES
Time reflects when diagnosis was documented in both MDM as applicable and the Disposition within this note       Time User Action Codes Description Comment    11/6/2024  8:30 PM Ryan Dixon [M79.3] Panniculitis     11/6/2024  8:30 PM Ryan Dixon [J96.91] Hypoxic respiratory failure (HCC)     11/6/2024  8:30 PM Ryan Dixon [I50.9] Acute on chronic congestive heart failure (HCC)     11/6/2024  8:45 PM Ryan Dixon Modify [M79.3] Panniculitis     11/6/2024  8:45 PM Ryan Dixon Modify [J96.91] Hypoxic respiratory failure (HCC)     11/6/2024  8:58 PM Tres Brown [Z79.01] Long term (current) use of anticoagulants           ED Disposition       ED Disposition   Admit    Condition   Stable    Date/Time   Wed Nov 6, 2024  8:45 PM    Comment   Case was discussed with Dr Hernandez and the patient's admission status was agreed to be Admission Status: inpatient status to the service of Dr. Hernandez .               Assessment & Plan       Medical Decision Making  74-year-old morbidly obese female who is cared for at home 12 hours a day.  She deals with abdominal wounds from her pannus, as well as her hips.  They are constantly draining and not healing.  She describes being more short of breath today feeling like she is wheezing.  She has chronic issues with hypotension and recurrent congestive heart failure.  Her pulse ox was dipping down into the high 80s today.  She normally wears no oxygen, and the emergency room initially she was 94 but dipped down to an 84%, and placed on 2 L nasal cannula.  Blood pressure is low at 90.  She denies any fevers, or any shaking chills.  She has had no issues with bowel or bladder.  There is no sacral wounds, but excessive drainage from each of her superficial wounds on her pannus.  She notably is chronic atrial fibrillation.    Differential diagnosis includes congestive heart failure, underlying pneumonia, underlying cellulitis is also possible.  She is  already on Coumadin so I do not think this is a pulmonary embolism despite her being bedridden.    Amount and/or Complexity of Data Reviewed  Labs: ordered.     Details: Labs reveal an elevated white count of 15,000 with leftward shift.  Calcium is a little as well.  Notably potassium and magnesium are adequate.  BNP is slightly elevated.  Radiology: ordered.     Details: Chest x-ray is consistent with vascular congestion.    Risk  Prescription drug management.  Decision regarding hospitalization.  Risk Details: During patient's hospital stay, her pulse ox was dipping down to 8485% which is unusual for her she usually wears no oxygen.  Her white count being elevated and her pannus appearing quite erythematous, I suspect she has an underlying cellulitis.  She has open wounds on her pannus as well.  Will move towards admission, IV Vanco and cefepime given as she has had this in the past.  Ultimate goal is to return home where she has a caregiver.  Patient is comfortable with this.  Notably her blood pressure was tenuous, between 90 and 110.  Lasix was not given at this time, and she does take midodrine from time to time depending on blood pressure.  Hypotension is chronic for her.        ED Course as of 11/06/24 2255 Wed Nov 06, 2024   2030 Patient comfortable.  Have reached out to [facility] for admission for panniculitis and exacerbation of her congestive heart failure       Medications   vancomycin (VANCOCIN) 2,000 mg in sodium chloride 0.9 % 500 mL IVPB (2,000 mg Intravenous New Bag 11/6/24 2217)   allopurinol (ZYLOPRIM) tablet 100 mg (has no administration in time range)   DULoxetine (CYMBALTA) delayed release capsule 20 mg (has no administration in time range)   gabapentin (NEURONTIN) capsule 100 mg (100 mg Oral Given 11/6/24 2142)   levothyroxine tablet 137 mcg (has no administration in time range)   midodrine (PROAMATINE) tablet 2.5 mg (has no administration in time range)   nystatin (MYCOSTATIN) powder ( Topical  Given 11/6/24 2146)   triamcinolone (KENALOG) 0.5 % cream 1 Application (1 Application Topical Not Given 11/6/24 2148)   insulin lispro (HumALOG/ADMELOG) 100 units/mL subcutaneous injection 4-20 Units (has no administration in time range)   insulin lispro (HumALOG/ADMELOG) 100 units/mL subcutaneous injection 4-20 Units ( Subcutaneous Not Given 11/6/24 2144)   furosemide (LASIX) injection 50 mg (50 mg Intravenous Not Given 11/6/24 2136)   acetaminophen (TYLENOL) tablet 650 mg (has no administration in time range)   oxyCODONE (ROXICODONE) IR tablet 5 mg (has no administration in time range)   oxyCODONE (ROXICODONE) immediate release tablet 10 mg (has no administration in time range)   HYDROmorphone (DILAUDID) injection 0.5 mg (has no administration in time range)   cefepime (MAXIPIME) IVPB (premix in dextrose) 2,000 mg 50 mL (2,000 mg Intravenous New Bag 11/6/24 2054)       ED Risk Strat Scores                           SBIRT 22yo+      Flowsheet Row Most Recent Value   Initial Alcohol Screen: US AUDIT-C     1. How often do you have a drink containing alcohol? 0 Filed at: 11/06/2024 1902   2. How many drinks containing alcohol do you have on a typical day you are drinking?  0 Filed at: 11/06/2024 1902   3a. Male UNDER 65: How often do you have five or more drinks on one occasion? 0 Filed at: 11/06/2024 1902   3b. FEMALE Any Age, or MALE 65+: How often do you have 4 or more drinks on one occassion? 0 Filed at: 11/06/2024 1902   Audit-C Score 0 Filed at: 11/06/2024 1902   JYOTI: How many times in the past year have you...    Used an illegal drug or used a prescription medication for non-medical reasons? Never Filed at: 11/06/2024 1902                            History of Present Illness       Chief Complaint   Patient presents with    Shortness of Breath     Sob for 3 days 90% on room air  Chest pain       Nausea    Cough       Past Medical History:   Diagnosis Date    Atrial fibrillation (HCC)     Bladder stone     C.  difficile colitis     Cellulitis     CHF (congestive heart failure) ([facility])     Depression with anxiety     Disease of thyroid gland     Diverticulitis     Enterocolitis     History of abnormal cervical Pap smear     Kidney stone     Lymphedema     Menopause     Age 50    Morbid obesity with BMI of 70 and over, adult ([facility])     MRSA (methicillin resistant Staphylococcus aureus)     abdominal wound    Osteoarthritis     Phlebitis     left lower leg    Spondylolysis       Past Surgical History:   Procedure Laterality Date    APPENDECTOMY      CARPAL TUNNEL RELEASE      CHOLECYSTECTOMY      CYSTOSCOPY      stent    FOOT SURGERY      bone spur    KNEE SURGERY      WISDOM TOOTH EXTRACTION        Family History   Problem Relation Age of Onset    Heart failure Mother     Heart disease Mother     Lymphoma Mother     Seizures Mother     Kidney disease Father     Heart disease Father     Heart failure Father     Diabetes type II Father     Diabetes type II Sister     Diabetes type II Brother     Uterine cancer Paternal Aunt     Breast cancer Neg Hx     Colon cancer Neg Hx     Ovarian cancer Neg Hx       Social History     Tobacco Use    Smoking status: Former     Current packs/day: 0.00     Average packs/day: 5.0 packs/day for 3.0 years (15.0 ttl pk-yrs)     Types: Cigarettes     Start date: 1967     Quit date: 1970     Years since quittin.3    Smokeless tobacco: Never    Tobacco comments:     5 packs/day until  (age 17-19) - As per [employer]    Vaping Use    Vaping status: Never Used   Substance Use Topics    Alcohol use: Not Currently    Drug use: Not Currently     Types: Marijuana     Comment: As a teen - As per [employer]       E-Cigarette/Vaping    E-Cigarette Use Never User       E-Cigarette/Vaping Substances    Nicotine No     THC No     CBD No     Flavoring No     Other No     Unknown No       I have reviewed and agree with the history as documented.     74-year-old morbidly obese female who is  cared for at home 12 hours a day.  She deals with abdominal wounds from her pannus, as well as her hips.  They are constantly draining and not healing.  She describes being more short of breath today feeling like she is wheezing.  She has chronic issues with hypotension and recurrent congestive heart failure.  Her pulse ox was dipping down into the high 80s today.  She normally wears no oxygen, and the emergency room initially she was 94 but dipped down to an 84%, and placed on 2 L nasal cannula.  Blood pressure is low at 90.  She denies any fevers, or any shaking chills.  She has had no issues with bowel or bladder.  There is no sacral wounds, but excessive drainage from each of her superficial wounds on her pannus.        Review of Systems   Constitutional:  Negative for chills and fever.   HENT:  Negative for sore throat.    Eyes:  Negative for pain, redness and visual disturbance.   Respiratory:  Positive for shortness of breath and wheezing. Negative for cough.    Cardiovascular:  Positive for leg swelling. Negative for chest pain.   Gastrointestinal:  Positive for abdominal distention and abdominal pain. Negative for diarrhea, nausea and vomiting.   Genitourinary:  Negative for dysuria, flank pain, frequency and urgency.   Musculoskeletal:  Positive for arthralgias. Negative for back pain, myalgias and neck pain.   Skin:  Negative for rash.   Neurological:  Negative for dizziness, weakness, light-headedness and headaches.   Hematological: Negative.    Psychiatric/Behavioral:  Negative for agitation, confusion and suicidal ideas. The patient is not nervous/anxious.    All other systems reviewed and are negative.          Objective       ED Triage Vitals   Temperature Pulse Blood Pressure Respirations SpO2 Patient Position - Orthostatic VS   11/06/24 1903 11/06/24 1903 11/06/24 1903 11/06/24 1903 11/06/24 1903 11/06/24 1903   (!) 97.3 °F (36.3 °C) 77 110/57 19 93 % Sitting      Temp Source Heart Rate Source BP  Location FiO2 (%) Pain Score    11/06/24 1903 11/06/24 1903 11/06/24 1903 -- 11/06/24 2220    Temporal Monitor Left arm  No Pain      Vitals      Date and Time Temp Pulse SpO2 Resp BP Pain Score FACES Pain Rating User   11/06/24 2225 -- -- 96 % -- -- No Pain -- NH   11/06/24 2220 98.1 °F (36.7 °C) 75 96 % 22 95/47 No Pain -- NH   11/06/24 2136 -- -- -- -- 102/49 -- -- NH   11/06/24 2045 -- 76 98 % 18 94/48 -- --    11/06/24 2015 -- 74 97 % 19 108/53 -- --    11/06/24 2000 -- 71 96 % 19 106/54 -- --    11/06/24 1920 -- -- 87 % -- -- -- --    11/06/24 1903 97.3 °F (36.3 °C) 77 93 % 19 110/57 -- -- EM            Physical Exam  Vitals and nursing note reviewed.   Constitutional:       General: She is not in acute distress.     Appearance: She is obese.   HENT:      Head: Normocephalic and atraumatic.   Eyes:      Conjunctiva/sclera: Conjunctivae normal.   Cardiovascular:      Rate and Rhythm: Normal rate and regular rhythm.      Heart sounds: No murmur heard.     Comments: Distant heart sounds  Pulmonary:      Effort: Pulmonary effort is normal. No respiratory distress.      Breath sounds: Normal breath sounds.   Abdominal:      Palpations: Abdomen is soft.      Tenderness: There is no abdominal tenderness.      Comments: To abdominal wounds and thigh wounds, left pannus and left thigh.  Actively draining.  Orange peel appearance to both abdomen.   Musculoskeletal:         General: No swelling. Normal range of motion.      Cervical back: Neck supple.      Right lower leg: Edema present.      Left lower leg: Edema present.   Skin:     General: Skin is warm and dry.      Capillary Refill: Capillary refill takes less than 2 seconds.      Comments: Erythema and warmth to the right pannus, erythema under each breast likely candidal infection, open wounds to the left thigh draining, no obvious purulent material.   Neurological:      General: No focal deficit present.      Mental Status: She is alert.   Psychiatric:          Mood and Affect: Mood normal.         Results Reviewed       Procedure Component Value Units Date/Time    Fingerstick Glucose (POCT) [[id number]]  (Abnormal) Collected: 11/06/24 2143    Lab Status: Final result Specimen: Blood Updated: 11/06/24 2144     POC Glucose 175 mg/dl     Procalcitonin [[id number]]  (Normal) Collected: 11/06/24 1933    Lab Status: Final result Specimen: Blood from Arm, Right Updated: 11/06/24 2104     Procalcitonin 0.09 ng/ml     Hemoglobin A1c w/EAG Estimation (Prechecked if no A1C within 90 days) [[id number]] Collected: 11/06/24 1933    Lab Status: In process Specimen: Blood from Arm, Right Updated: 11/06/24 2055    B-Type Natriuretic Peptide(BNP) [[id number]]  (Abnormal) Collected: 11/06/24 1933    Lab Status: Final result Specimen: Blood from Arm, Right Updated: 11/06/24 2003      pg/mL     Comprehensive metabolic panel [[id number]]  (Abnormal) Collected: 11/06/24 1933    Lab Status: Final result Specimen: Blood from Arm, Right Updated: 11/06/24 1957     Sodium 135 mmol/L      Potassium 3.8 mmol/L      Chloride 97 mmol/L      CO2 32 mmol/L      ANION GAP 6 mmol/L      BUN 31 mg/dL      Creatinine 1.22 mg/dL      Glucose 167 mg/dL      Calcium 7.9 mg/dL      Corrected Calcium 9.0 mg/dL      AST 13 U/L      ALT 8 U/L      Alkaline Phosphatase 86 U/L      Total Protein 7.2 g/dL      Albumin 2.6 g/dL      Total Bilirubin 0.46 mg/dL      eGFR 44 ml/min/1.73sq m     Narrative:      National Kid[employer] Foundation guidelines for Chronic Kidney Disease (CKD):     Stage 1 with normal or high GFR (GFR > 90 mL/min/1.73 square meters)    Stage 2 Mild CKD (GFR = 60-89 mL/min/1.73 square meters)    Stage 3A Moderate CKD (GFR = 45-59 mL/min/1.73 square meters)    Stage 3B Moderate CKD (GFR = 30-44 mL/min/1.73 square meters)    Stage 4 Severe CKD (GFR = 15-29 mL/min/1.73 square meters)    Stage 5 End Stage CKD (GFR <15 mL/min/1.73 square meters)  Note: GFR calculation is accurate only with a  steady state creatinine    Magnesium [[id number]]  (Normal) Collected: 11/06/24 1933    Lab Status: Final result Specimen: Blood from Arm, Right Updated: 11/06/24 1957     Magnesium 1.9 mg/dL     Protime-INR [[id number]]  (Abnormal) Collected: 11/06/24 1933    Lab Status: Final result Specimen: Blood from Arm, Right Updated: 11/06/24 1953     Protime 25.9 seconds      INR 2.34    Narrative:      INR Therapeutic Range    Indication                                             INR Range      Atrial Fibrillation                                               2.0-3.0  Hypercoagulable State                                    2.0.2.3  Left Ventricular Asist Device                            2.0-3.0  Mechanical Heart Valve                                  -    Aortic(with afib, MI, embolism, HF, LA enlargement,    and/or coagulopathy)                                     2.0-3.0 (2.5-3.5)     Mitral                                                             2.5-3.5  Prosthetic/Bioprosthetic Heart Valve               2.0-3.0  Venous thromboembolism (VTE: VT, PE        2.0-3.0    CBC and differential [[id number]]  (Abnormal) Collected: 11/06/24 1933    Lab Status: Final result Specimen: Blood from Arm, Right Updated: 11/06/24 1940     WBC 15.97 Thousand/uL      RBC 4.10 Million/uL      Hemoglobin 10.4 g/dL      Hematocrit 35.9 %      MCV 88 fL      MCH 25.4 pg      MCHC 29.0 g/dL      RDW 16.8 %      MPV 8.2 fL      Platelets 437 Thousands/uL      nRBC 0 /100 WBCs      Segmented % 87 %      Immature Grans % 1 %      Lymphocytes % 6 %      Monocytes % 4 %      Eosinophils Relative 2 %      Basophils Relative 0 %      Absolute Neutrophils 13.97 Thousands/µL      Absolute Immature Grans 0.09 Thousand/uL      Absolute Lymphocytes 0.92 Thousands/µL      Absolute Monocytes 0.56 Thousand/µL      Eosinophils Absolute 0.37 Thousand/µL      Basophils Absolute 0.06 Thousands/µL             XR chest 1 view portable    (Results Pending)        ECG 12 Lead Documentation Only    Date/Time: 11/6/2024 7:37 PM    Performed by: Ryan Dixon DO  Authorized by: Ryan Dixon DO    Indications / Diagnosis:  Shortness of breath  ECG reviewed by me, the ED Provider: yes    Patient location:  ED  Previous ECG:     Previous ECG:  Compared to current    Similarity:  No change  Interpretation:     Interpretation: abnormal    Quality:     Tracing quality:  Limited by artifact  Rate:     ECG rate:  75  Rhythm:     Rhythm: atrial fibrillation    Ectopy:     Ectopy: none    QRS:     QRS axis:  Normal    QRS intervals:  Normal  Conduction:     Conduction: normal    ST segments:     ST segments:  Non-specific  T waves:     T waves: non-specific    Comments:      Low voltage, poor R progression.      ED Medication and Procedure Management   Prior to Admission Medications   Prescriptions Last Dose Informant Patient Reported? Taking?   DULoxetine (CYMBALTA) 20 mg capsule  Self No No   Sig: Take 1 capsule (20 mg total) by mouth daily   Patient taking differently: Take 20 mg by mouth daily Every other day   acetaminophen (TYLENOL) 650 mg CR tablet  Self Yes No   Sig: Take 650 mg by mouth every 8 (eight) hours as needed (for osteoarthritis)   allopurinol (ZYLOPRIM) 100 mg tablet  Self No No   Sig: Take 1 tablet (100 mg total) by mouth daily   benzonatate (TESSALON PERLES) 100 mg capsule   No No   Sig: Take 1 capsule (100 mg total) by mouth every 8 (eight) hours   furosemide (LASIX) 40 mg tablet   No No   Sig: Take 1.5 tablets (60 mg total) by mouth daily   gabapentin (NEURONTIN) 100 mg capsule   No No   Sig: Take 1 capsule (100 mg total) by mouth 3 (three) times a day   iron polysaccharides (Ferrex 150) 150 mg capsule   Yes No   Sig: Take 150 mg by mouth 2 (two) times a day. Indications: Anemia From Inadequate Iron in the Body   Patient not taking: Reported on 5/23/2024   levothyroxine 125 mcg tablet  Self Yes No   Sig: Take 137 mcg by mouth daily   midodrine  (PROAMATINE) 2.5 mg tablet   No No   Sig: Take 1 tablet (2.5 mg total) by mouth 3 (three) times a day before meals   Patient taking differently: Take 2.5 mg by mouth 3 (three) times a day as needed   nystatin (MYCOSTATIN) powder   No No   Sig: Apply topically 2 (two) times a day   Patient not taking: Reported on 5/23/2024   pantoprazole (PROTONIX) 40 mg tablet   No No   Sig: Take 1 tablet (40 mg total) by mouth daily in the early morning   Patient not taking: Reported on 5/23/2024   triamcinolone (KENALOG) 0.5 % cream   Yes No   Sig: Apply 1 Application topically as needed for rash or irritation   warfarin (Coumadin) 5 mg tablet   No No   Sig: Take 1 tablet (5 mg total) by mouth daily   Patient taking differently: Take 5 mg by mouth daily 2 mg M,W,F SAT, SUN  4 mg T,TH,      Facility-Administered Medications: None     Patient's Medications   Discharge Prescriptions    No medications on file     No discharge procedures on file.  ED SEPSIS DOCUMENTATION   Time reflects when diagnosis was documented in both MDM as applicable and the Disposition within this note       Time User Action Codes Description Comment    11/6/2024  8:30 PM Ryan Dixon [M79.3] Panniculitis     11/6/2024  8:30 PM Ryan Dixon [J96.91] Hypoxic respiratory failure (HCC)     11/6/2024  8:30 PM Ryan Dixon [I50.9] Acute on chronic congestive heart failure (HCC)     11/6/2024  8:45 PM Ryan Dixon Modify [M79.3] Panniculitis     11/6/2024  8:45 PM Ryan Dixon [J96.91] Hypoxic respiratory failure (HCC)     11/6/2024  8:58 PM Tres Brown [Z79.01] Long term (current) use of anticoagulants                  Ryan Dixon DO  11/06/24 5258

## 2024-11-07 NOTE — ASSESSMENT & PLAN NOTE
Chronic condition/stable  Rate controlled and systemically anticoagulated  Therapeutic INR upon presentation  Daily PT/INR-continue Coumadin

## 2024-11-07 NOTE — ASSESSMENT & PLAN NOTE
Lab Results   Component Value Date    HGBA1C 7.2 (H) 11/06/2024       Recent Labs     11/06/24  2143 11/07/24  1130   POCGLU 175* 106       Blood Sugar Average: Last 72 hrs:  (P) 140.5  Patient not maintained on antihyperglycemic's in the outpatient setting  Continue sliding scale insulin coverage ACHS  Patient is adamant that she does not have diabetes  BMP a.m.

## 2024-11-07 NOTE — ASSESSMENT & PLAN NOTE
Wt Readings from Last 3 Encounters:   11/06/24 (!) 198 kg (436 lb 1.1 oz)   07/06/24 (!) 196 kg (432 lb 1.6 oz)   06/04/24 (!) 186 kg (410 lb)     Patient presents today (11/6) with multiple complaints including generalized fatigue, shortness of breath, nonproductive cough, which has been persistent/progressive over the past several days  Patient mostly bedbound at home with 24-hour caregivers secondary to morbid obesity and multiple medical problems  Reportedly, has been unable to take her diuretic consistently over the past week or so secondary to lower blood pressures which she states is chronic  Patient denies chest pressure, palpitations, lightheadedness, dizziness, fever, chills, sweats    In ED, patient hemodynamically stable, without SIRS criteria, transient desaturation to 88% requiring 2 L nasal cannula  Patient noted with bibasilar crackles with evidence of pulmonary edema on plain film imaging  Furthermore, patient weight up from dry weight approximately 6 pounds raising concern for exacerbation of underlying HFpEF  Received dose of IV diuretics in the ED  Referred for admission secondary to transient episode of hypoxia with evidence of intrapulmonary edema and volume overload consistent with exacerbation of underlying HFpEF    Plan:  Admit to hospitalist service  Proceed with cautious diuretic therapy  Is to be maintained on 60 mg IV Lasix twice daily at home  Adequate volume unloading challenging the setting of hypotension  Proceed with midodrine 2.5 mg p.o. 3 times daily  Proceed with IV diuretic therapy 50 mg IV Lasix over the next 12 to 24 hours  Strict intake and output  Daily weights  Most recent echocardiogram reviewed-1/2024-preserved EF with evidence of grade 1 diastolic dysfunction  Is greater than 6 months since last echocardiogram and presentation with volume overload-repeat TTE  Consider cardiology consultation if inadequate volume unloading over the next 24 hours

## 2024-11-07 NOTE — ASSESSMENT & PLAN NOTE
Chronic condition/stable  Does not appear to have gouty flare at this juncture  Continue to monitor

## 2024-11-07 NOTE — ASSESSMENT & PLAN NOTE
Patient with chronic wounds/lymphedema  Patient with complaint of redness and slight pain in the fold/pannus right abdomen  On examination, appears more fungal in nature with satellite lesions however cannot rule out superimposed bacterial infection  Patient without SIRS criteria upon presentation  In ED, received broad-spectrum intravenous antimicrobials-vancomycin/cefepime  Hold off on further antibiotic therapy at this time given negative procalcitonin, improving leukocytosis and lack of fever

## 2024-11-07 NOTE — ASSESSMENT & PLAN NOTE
Chronic condition/stable  Recommend incorporating a more whole foods plant-predominant diet along with decreasing consumption of red meats and processed foods  Per AHA guidelines, recommend moderate-vigorous intensity exercise for 30 minutes a day for 5 days a week or a total of 150 min/week

## 2024-11-07 NOTE — ASSESSMENT & PLAN NOTE
Chronic condition/stable  Rate controlled, on Coumadin for anticoagulation  Therapeutic INR upon presentation  Daily PT/INR-continue Coumadin

## 2024-11-07 NOTE — ASSESSMENT & PLAN NOTE
Patient with chronic wounds/lymphedema  Patient with complaint of redness and slight pain in the fold/pannus right abdomen  On examination, appears more fungal in nature with satellite lesions however cannot rule out superimposed bacterial infection  Patient without SIRS criteria upon presentation  In ED, received broad-spectrum intravenous antimicrobials-vancomycin/cefepime  Hold off on further antibiotic therapy at this time pending labs/procalcitonin tomorrow

## 2024-11-07 NOTE — ASSESSMENT & PLAN NOTE
Patient with apparent desaturation upon EMS arrival-86% requiring 2 L  Upon arrival to ED, trialed on room air-initially saturating in the mid 90s however had transient desaturation to 88% requiring initiation of 2 L nasal cannula  Patient not oxygen dependent at baseline  See plan for acute on chronic heart failure with preserved ejection fraction

## 2024-11-08 LAB
ANION GAP SERPL CALCULATED.3IONS-SCNC: 5 MMOL/L (ref 4–13)
BUN SERPL-MCNC: 28 MG/DL (ref 5–25)
CALCIUM SERPL-MCNC: 8 MG/DL (ref 8.4–10.2)
CHLORIDE SERPL-SCNC: 100 MMOL/L (ref 96–108)
CO2 SERPL-SCNC: 32 MMOL/L (ref 21–32)
CREAT SERPL-MCNC: 1.1 MG/DL (ref 0.6–1.3)
ERYTHROCYTE [DISTWIDTH] IN BLOOD BY AUTOMATED COUNT: 16.6 % (ref 11.6–15.1)
GFR SERPL CREATININE-BSD FRML MDRD: 49 ML/MIN/1.73SQ M
GLUCOSE SERPL-MCNC: 104 MG/DL (ref 65–140)
GLUCOSE SERPL-MCNC: 106 MG/DL (ref 65–140)
GLUCOSE SERPL-MCNC: 126 MG/DL (ref 65–140)
GLUCOSE SERPL-MCNC: 128 MG/DL (ref 65–140)
GLUCOSE SERPL-MCNC: 137 MG/DL (ref 65–140)
HCT VFR BLD AUTO: 32.6 % (ref 34.8–46.1)
HGB BLD-MCNC: 9.4 G/DL (ref 11.5–15.4)
INR PPP: 2.46 (ref 0.85–1.19)
MCH RBC QN AUTO: 25.3 PG (ref 26.8–34.3)
MCHC RBC AUTO-ENTMCNC: 28.8 G/DL (ref 31.4–37.4)
MCV RBC AUTO: 88 FL (ref 82–98)
PLATELET # BLD AUTO: 417 THOUSANDS/UL (ref 149–390)
PMV BLD AUTO: 8.1 FL (ref 8.9–12.7)
POTASSIUM SERPL-SCNC: 3.9 MMOL/L (ref 3.5–5.3)
PROTHROMBIN TIME: 26.9 SECONDS (ref 12.3–15)
RBC # BLD AUTO: 3.71 MILLION/UL (ref 3.81–5.12)
SODIUM SERPL-SCNC: 137 MMOL/L (ref 135–147)
WBC # BLD AUTO: 12.83 THOUSAND/UL (ref 4.31–10.16)

## 2024-11-08 PROCEDURE — 99233 SBSQ HOSP IP/OBS HIGH 50: CPT | Performed by: PHYSICIAN ASSISTANT

## 2024-11-08 PROCEDURE — 85610 PROTHROMBIN TIME: CPT

## 2024-11-08 PROCEDURE — 80048 BASIC METABOLIC PNL TOTAL CA: CPT

## 2024-11-08 PROCEDURE — 82948 REAGENT STRIP/BLOOD GLUCOSE: CPT

## 2024-11-08 PROCEDURE — 85027 COMPLETE CBC AUTOMATED: CPT

## 2024-11-08 RX ORDER — LANCETS 33 GAUGE
EACH MISCELLANEOUS
Qty: 100 EACH | Refills: 0 | Status: SHIPPED | OUTPATIENT
Start: 2024-11-08

## 2024-11-08 RX ORDER — GINSENG 100 MG
1 CAPSULE ORAL 2 TIMES DAILY
Status: DISCONTINUED | OUTPATIENT
Start: 2024-11-08 | End: 2024-11-10 | Stop reason: HOSPADM

## 2024-11-08 RX ORDER — BLOOD-GLUCOSE METER
KIT MISCELLANEOUS
Qty: 1 KIT | Refills: 0 | Status: SHIPPED | OUTPATIENT
Start: 2024-11-08

## 2024-11-08 RX ORDER — BLOOD SUGAR DIAGNOSTIC
STRIP MISCELLANEOUS
Qty: 100 EACH | Refills: 0 | Status: SHIPPED | OUTPATIENT
Start: 2024-11-08

## 2024-11-08 RX ORDER — GLUCOSAMINE HCL/CHONDROITIN SU 500-400 MG
CAPSULE ORAL
Qty: 100 EACH | Refills: 0 | Status: SHIPPED | OUTPATIENT
Start: 2024-11-08

## 2024-11-08 RX ORDER — FUROSEMIDE 10 MG/ML
50 INJECTION INTRAMUSCULAR; INTRAVENOUS 2 TIMES DAILY
Status: DISCONTINUED | OUTPATIENT
Start: 2024-11-08 | End: 2024-11-09

## 2024-11-08 RX ADMIN — MIDODRINE HYDROCHLORIDE 5 MG: 5 TABLET ORAL at 18:20

## 2024-11-08 RX ADMIN — GABAPENTIN 200 MG: 100 CAPSULE ORAL at 08:18

## 2024-11-08 RX ADMIN — LEVOTHYROXINE SODIUM 137 MCG: 25 TABLET ORAL at 05:59

## 2024-11-08 RX ADMIN — GABAPENTIN 200 MG: 100 CAPSULE ORAL at 18:20

## 2024-11-08 RX ADMIN — TRIAMCINOLONE ACETONIDE 1 APPLICATION: 5 CREAM TOPICAL at 11:15

## 2024-11-08 RX ADMIN — MIDODRINE HYDROCHLORIDE 5 MG: 5 TABLET ORAL at 11:33

## 2024-11-08 RX ADMIN — GABAPENTIN 200 MG: 100 CAPSULE ORAL at 21:44

## 2024-11-08 RX ADMIN — FUROSEMIDE 50 MG: 10 INJECTION, SOLUTION INTRAMUSCULAR; INTRAVENOUS at 18:20

## 2024-11-08 RX ADMIN — ALLOPURINOL 100 MG: 100 TABLET ORAL at 08:18

## 2024-11-08 RX ADMIN — FUROSEMIDE 50 MG: 10 INJECTION, SOLUTION INTRAMUSCULAR; INTRAVENOUS at 08:18

## 2024-11-08 RX ADMIN — MIDODRINE HYDROCHLORIDE 5 MG: 5 TABLET ORAL at 06:00

## 2024-11-08 NOTE — PLAN OF CARE
Problem: PAIN - ADULT  Goal: Verbalizes/displays adequate comfort level or baseline comfort level  Description: Interventions:  - Encourage patient to monitor pain and request assistance  - Assess pain using appropriate pain scale  - Administer analgesics based on type and severity of pain and evaluate response  - Implement non-pharmacological measures as appropriate and evaluate response  - Consider cultural and social influences on pain and pain management  - Notify physician/advanced practitioner if interventions unsuccessful or patient reports new pain  Outcome: Progressing     Problem: INFECTION - ADULT  Goal: Absence or prevention of progression during hospitalization  Description: INTERVENTIONS:  - Assess and monitor for signs and symptoms of infection  - Monitor lab/diagnostic results  - Monitor all insertion sites, i.e. indwelling lines, tubes, and drains  - Monitor endotracheal if appropriate and nasal secretions for changes in amount and color  - [place] appropriate cooling/warming therapies per order  - Administer medications as ordered  - Instruct and encourage patient and family to use good hand hygiene technique  - Identify and instruct in appropriate isolation precautions for identified infection/condition  Outcome: Progressing  Goal: Absence of fever/infection during neutropenic period  Description: INTERVENTIONS:  - Monitor WBC    Outcome: Progressing     Problem: SAFETY ADULT  Goal: Maintain or return to baseline ADL function  Description: INTERVENTIONS:  -  Assess patient's ability to carry out ADLs; assess patient's baseline for ADL function and identify physical deficits which impact ability to perform ADLs (bathing, care of mouth/teeth, toileting, grooming, dressing, etc.)  - Assess/evaluate cause of self-care deficits   - Assess range of motion  - Assess patient's mobility; develop plan if impaired  - Assess patient's need for assistive devices and provide as appropriate  - Encourage  maximum independence but intervene and supervise when necessary  - Involve family in performance of ADLs  - Assess for home care needs following discharge   - Consider OT consult to assist with ADL evaluation and planning for discharge  - Provide patient education as appropriate  Outcome: Progressing     Problem: DISCHARGE PLANNING  Goal: Discharge to home or other facility with appropriate resources  Description: INTERVENTIONS:  - Identify barriers to discharge w/patient and caregiver  - Arrange for needed discharge resources and transportation as appropriate  - Identify discharge learning needs (meds, wound care, etc.)  - Arrange for interpretive services to assist at discharge as needed  - Refer to Case Management Department for coordinating discharge planning if the patient needs post-hospital services based on physician/advanced practitioner order or complex needs related to functional status, cognitive ability, or social support system  Outcome: Progressing     Problem: Knowledge Deficit  Goal: Patient/family/caregiver demonstrates understanding of disease process, treatment plan, medications, and discharge instructions  Description: Complete learning assessment and assess knowledge base.  Interventions:  - Provide teaching at level of understanding  - Provide teaching via preferred learning methods  Outcome: Progressing     Problem: Prexisting or High Potential for Compromised Skin Integrity  Goal: Skin integrity is maintained or improved  Description: INTERVENTIONS:  - Identify patients at risk for skin breakdown  - Assess and monitor skin integrity  - Assess and monitor nutrition and hydration status  - Monitor labs   - Assess for incontinence   - Turn and reposition patient  - Assist with mobility/ambulation  - Relieve pressure over bony prominences  - Avoid friction and shearing  - Provide appropriate hygiene as needed including keeping skin clean and dry  - Evaluate need for skin moisturizer/barrier  cream  - Collaborate with interdisciplinary team   - Patient/family teaching  - Consider wound care consult   Outcome: Progressing

## 2024-11-08 NOTE — PROGRESS NOTES
Progress Note - Hospitalist   Name: Zee Kirk 74 y.o. female I MRN: [id number]  Unit/Bed#: -01 I Date of Admission: 11/6/2024   Date of Service: 11/8/2024 I Hospital Day: 2    Assessment & Plan  Acute on chronic heart failure with preserved ejection fraction (HFpEF) ([facility])  Wt Readings from Last 3 Encounters:   11/08/24 (!) 196 kg (433 lb 3.2 oz)   07/06/24 (!) 196 kg (432 lb 1.6 oz)   06/04/24 (!) 186 kg (410 lb)     Patient presented (11/6) with multiple complaints including generalized fatigue, shortness of breath, nonproductive cough, which has been persistent/progressive over the past several days  Patient mostly bedbound at home with 24/7 caregivers secondary to morbid obesity and multiple medical problems  Reportedly, has been unable to take her diuretic consistently over the past week or so secondary to lower blood pressures   Acute on chronic HFpEF a/e/b weight gain 6#, bibasilar crackles, and missed dosing diuretic prior to admit     PLAN:   IV lasix 50 mg BID  Daily weights, strict I/Os  Requiring up to 6 L O2 via NC, baseline is no oxygen  Respiratory protocol continue to wean oxygen as able  Hypoxic episode  Report of desaturation upon EMS arrival - 86% requiring 2 L  Transient desaturation to 88% on RA in ER  Weaned from 6 L back to 2 L; attempt to wean completely  May require O2 on discharge   Panniculitis  Chronic wounds/lymphedema, presented with complaint of redness and slight pain in the fold/pannus right abdomen  On examination, appears more fungal in nature with satellite lesions however cannot rule out superimposed bacterial infection  Patient without SIRS criteria upon presentation  In ED, received broad-spectrum intravenous antimicrobials-vancomycin/cefepime  Hold off on further antibiotic therapy  Continue wound care, routine hygiene care, topical antifungal   Improving without further intervention   Other specified hypothyroidism  Continue levothyroxine  Mild episode of  recurrent major depressive disorder (HCC)  Continue Cymbalta  Class 3 severe obesity due to excess calories with serious comorbidity and body mass index (BMI) greater than or equal to 70 in adult ([facility])  Body mass index is 74.36 kg/m².  Recommend incorporating a more whole foods plant-predominant diet along with decreasing consumption of red meats and processed foods  Per AHA guidelines, recommend moderate-vigorous intensity exercise for 30 minutes a day for 5 days a week or a total of 150 min/week  Bed-bound status  Gastroesophageal reflux disease without esophagitis  Continue PPI  Longstanding persistent atrial fibrillation ([facility])  Chronic condition/stable  Rate controlled, on Coumadin for anticoagulation  Therapeutic INR upon presentation  Type 2 diabetes mellitus without complication, without long-term current use of insulin ([facility])  Blood Sugar Average: Last 72 hrs:(P) 129.6  Patient not maintained on antihyperglycemic's in the outpatient setting  Patient is adamant that she does not have diabetes, A1c 7.2%    VTE Pharmacologic Prophylaxis: VTE Score: 8 High Risk (Score >/= 5) - Pharmacological DVT Prophylaxis Ordered: warfarin (Coumadin). Sequential Compression Devices Ordered.    Mobility:   Basic Mobility Inpatient Raw Score: 8  -HLM Goal: 3: Sit at edge of bed  JH-HLM Achieved: 1: Laying in bed  JH-HLM Goal NOT achieved. Continue with multidisciplinary rounding and encourage appropriate mobility to improve upon JH-HLM goals.    Patient Centered Rounds: I performed bedside rounds with nursing staff today.   Discussions with Specialists or Other Care Team Provider: CM    Education and Discussions with Family / Patient: Patient declined call to .     Current Length of Stay: 2 day(s)  Current Patient Status: Inpatient   Certification Statement: The patient will continue to require additional inpatient hospital stay due to acute on chronic CHF, renal monitoring   Discharge Plan: Anticipate discharge  "in 24-48 hrs to home with home services.    Code Status: Level 1 - Full Code    Subjective   Seen this am, she is feeling generally unwell. No fevers or chills. She expressed concern that she might have been exposed to mycoplasma as her caregivers' child is sick with the same, but the caregivers have no exhibited any respiratory symptoms. No fevers fevers or chills for Zee. She has many questions regarding her labs, long-term treatment plan, diagnoses (specifically the diabetes), and where we go from here.     Discussed at length her diabetes trend, recommendation for treatment and risks of treatment. She would like to try dietary restriction first, wants to see dietician in hospital. Reviewed her echo results at bedside as well as her renal trend and chronic leukocytosis trend. Discussed current INR value.     Last BM 2 days ago, usually goes every 2-4 days and \"feels like something is there\" but declines more than miralax at this time. Reports she will eat pork when she gets really backed up, as it causes diarrhea.     Objective :  Temp:  [97.8 °F (36.6 °C)-98.4 °F (36.9 °C)] 98 °F (36.7 °C)  HR:  [51-69] 68  BP: ()/(48-86) 101/48  Resp:  [17-21] 20  SpO2:  [96 %-99 %] 96 %  O2 Device: Nasal cannula  Nasal Cannula O2 Flow Rate (L/min):  [2 L/min-6 L/min] 2 L/min    Body mass index is 74.36 kg/m².     Input and Output Summary (last 24 hours):     Intake/Output Summary (Last 24 hours) at 11/8/2024 0812  Last data filed at 11/8/2024 0613  Gross per 24 hour   Intake 580 ml   Output 1050 ml   Net -470 ml       Physical Exam  Vitals and nursing note reviewed.   Constitutional:       General: She is awake.      Appearance: Normal appearance. She is morbidly obese. She is ill-appearing. She is not toxic-appearing.      Comments: Chronically ill appearing, but not acutely toxic   Cardiovascular:      Rate and Rhythm: Normal rate and regular rhythm.      Heart sounds: Normal heart sounds. No murmur " heard.  Pulmonary:      Effort: Pulmonary effort is normal. No respiratory distress.      Breath sounds: Decreased breath sounds (likely resultant of body habitus) present. No wheezing.   Abdominal:      General: Bowel sounds are normal. There is no distension.      Palpations: Abdomen is soft.      Tenderness: There is no abdominal tenderness.   Musculoskeletal:      Right lower leg: Edema present.      Left lower leg: Edema present.   Skin:     General: Skin is warm and dry.      Coloration: Skin is not pale.   Neurological:      Mental Status: She is alert and oriented to person, place, and time.   Psychiatric:         Mood and Affect: Mood normal.         Behavior: Behavior normal. Behavior is cooperative.            Lines/Drains:  Lines/Drains/Airways       Active Status       Name Placement date Placement time Site Days    External Urinary Catheter 11/06/24  2350  -- 1                            Lab Results: I have reviewed the following results:   Results from last 7 days   Lab Units 11/08/24  0549 11/07/24  0619 11/06/24  1933   WBC Thousand/uL 12.83*   < > 15.97*   HEMOGLOBIN g/dL 9.4*   < > 10.4*   HEMATOCRIT % 32.6*   < > 35.9   PLATELETS Thousands/uL 417*   < > 437*   SEGS PCT %  --   --  87*   LYMPHO PCT %  --   --  6*   MONO PCT %  --   --  4   EOS PCT %  --   --  2    < > = values in this interval not displayed.     Results from last 7 days   Lab Units 11/08/24  0549 11/07/24  0619 11/06/24  1933   SODIUM mmol/L 137   < > 135   POTASSIUM mmol/L 3.9   < > 3.8   CHLORIDE mmol/L 100   < > 97   CO2 mmol/L 32   < > 32   BUN mg/dL 28*   < > 31*   CREATININE mg/dL 1.10   < > 1.22   ANION GAP mmol/L 5   < > 6   CALCIUM mg/dL 8.0*   < > 7.9*   ALBUMIN g/dL  --   --  2.6*   TOTAL BILIRUBIN mg/dL  --   --  0.46   ALK PHOS U/L  --   --  86   ALT U/L  --   --  8   AST U/L  --   --  13   GLUCOSE RANDOM mg/dL 106   < > 167*    < > = values in this interval not displayed.     Results from last 7 days   Lab Units  11/06/24  1933   INR  2.34*     Results from last 7 days   Lab Units 11/08/24  0712 11/07/24  2042 11/07/24  1622 11/07/24  1130 11/06/24  2143   POC GLUCOSE mg/dl 104 152* 111 106 175*     Results from last 7 days   Lab Units 11/06/24 1933   HEMOGLOBIN A1C % 7.2*     Results from last 7 days   Lab Units 11/06/24 1933   PROCALCITONIN ng/ml 0.09       Recent Cultures (last 7 days):         Imaging Results Review: No pertinent imaging studies reviewed.  Other Study Results Review: No additional pertinent studies reviewed.    Last 24 Hours Medication List:     Current Facility-Administered Medications:     acetaminophen (TYLENOL) tablet 650 mg, Q4H PRN    allopurinol (ZYLOPRIM) tablet 100 mg, Daily    DULoxetine (CYMBALTA) delayed release capsule 20 mg, Daily    furosemide (LASIX) injection 50 mg, BID    gabapentin (NEURONTIN) capsule 200 mg, TID    HYDROmorphone (DILAUDID) injection 0.5 mg, Q1H PRN    insulin lispro (HumALOG/ADMELOG) 100 units/mL subcutaneous injection 4-20 Units, TID AC **AND** Fingerstick Glucose (POCT), TID AC    insulin lispro (HumALOG/ADMELOG) 100 units/mL subcutaneous injection 4-20 Units, HS    levothyroxine tablet 137 mcg, Early Morning    midodrine (PROAMATINE) tablet 5 mg, TID AC    oxyCODONE (ROXICODONE) immediate release tablet 10 mg, Q4H PRN    oxyCODONE (ROXICODONE) IR tablet 5 mg, Q4H PRN    triamcinolone (KENALOG) 0.5 % cream 1 Application, TID    Administrative Statements   Today, Patient Was Seen By: Roxane Romero PA-C  I have spent a total time of 55 minutes in caring for this patient on the day of the visit/encounter including Risks and benefits of tx options, Instructions for management, Patient and family education, Importance of tx compliance, Impressions, Counseling / Coordination of care, Documenting in the medical record, Reviewing / ordering tests, medicine, procedures  , Obtaining or reviewing history  , and Communicating with other healthcare professionals  .    **Please Note: This note may have been constructed using a voice recognition system.**

## 2024-11-08 NOTE — NUTRITION
11/08/24 1503   Biochemical Data,Medical Tests, and Procedures   Biochemical Data/Medical Tests/Procedures Lab values reviewed;Meds reviewed   Labs (Comment) 11/8/2024 BUN 28, calcium 8.0, hemoglobin 9.4, hematocrit 32.6, 11/7/2024 hemoglobin A1c 7.2   Meds (Comment) Allopurinol, Cymbalta, gabapentin, levothyroxine, midodrine   Nutrition-Focused Physical Exam   Nutrition-Focused Physical Exam Findings RN skin assessment reviewed;Edema;Wound  (Wound at medial upper back, wound at medial lower abdomen, wound at left breast, wound at the left anterior proximal hip, wound at left posterior hip, wound at right lateral abdomen, +1 generalized edema, nonpitting BL upper and lower extremity edema)   Nutrition-Focused Physical Exam Findings Patient with obesity   Medical-Related Concerns heart failure, hypothyroidism, gout, obesity, GERD, type 2 diabetes, anemia   Adequacy of Intake   Nutrition Modality PO   Feeding Route   PO Independent   Current PO Intake   Current Diet Order cardiac, CCD 2, 1800 mL fluid restricted diet, thin liquids   Current Meal Intake %   Estimated calorie intake compared to estimated need Nutrient needs are met   PES Statement   Problem Behavioral-Environmental   Knowledge and Beliefs (1) Self-monitoring deficit NB-1.4   Related to Other (Comment)  (Obesity)   As evidenced by: BMI;Intake > estimated needs   Recommendations/Interventions   Malnutrition/BMI Present Yes   Provider already documenting morbid obesity Yes   Adult BMI Classifications Morbid Obesity > 70   Summary Consult-CHF; wound care RN consulted; high BMI.  Presents with SOB, nausea, cough.  Past medical history significant for heart failure, hypothyroidism, gout, obesity, GERD, type 2 diabetes, anemia.  Food allergies to pork and vitamin C noted.  Weight history reviewed.  No significant changes.  +1 generalized edema, nonpitting bilateral upper extremity and lower extremity edema.  Skin integrity as above.  Prescribed a  "cardiac, CCD 2, 1800 mL fluid restricted diet, thin liquids.  Meal completion 75%.  Nutrient needs are met.  Patient reports having an \"ugly\" appetite.  Usually has 2 meals daily.  States she uses no salt and monitors intake of carbs.  Denies dysphagia.  Her home aids cook and grocery shop.  RD discussed diet as prescribed.  Provided and discussed \"my plate for diabetes\" handout.  Advised intake of nonstarchy foods and protein.  Minimizing drinks from calories.  Patient states she already exercises portion control.  Clarifies that she does eat some foods that contain vitamin C.  However not on agreeable to trying nonstarchy vegetables with small amounts of vitamin C.  She offers no additional nutrition questions.  RD to follow as needed.   Interventions/Recommendations Continue current diet order   Education Assessment   Education Education initiated/ completed   Education Notes Provided and discussed \"my plate for diabetes\" handout. Advised intake of nonstarchy foods and protein. Minimizing drinks from calories. Patient states she already exercises portion control.   Patient Nutrition Goals   Goal Comprehend education;Improve to healthful diet;Avoid weight gain   Goal Status Initiated   Timeframe to complete goal by next f/u   Nutrition Complexity Risk   Nutrition complexity level Low risk   Follow up date 11/15/24       "

## 2024-11-08 NOTE — ASSESSMENT & PLAN NOTE
Chronic condition/stable  Rate controlled, on Coumadin for anticoagulation  Therapeutic INR upon presentation

## 2024-11-08 NOTE — ASSESSMENT & PLAN NOTE
Blood Sugar Average: Last 72 hrs:(P) 129.6  Patient not maintained on antihyperglycemic's in the outpatient setting  Patient is adamant that she does not have diabetes, A1c 7.2%

## 2024-11-08 NOTE — ASSESSMENT & PLAN NOTE
Wt Readings from Last 3 Encounters:   11/08/24 (!) 196 kg (433 lb 3.2 oz)   07/06/24 (!) 196 kg (432 lb 1.6 oz)   06/04/24 (!) 186 kg (410 lb)     Patient presented (11/6) with multiple complaints including generalized fatigue, shortness of breath, nonproductive cough, which has been persistent/progressive over the past several days  Patient mostly bedbound at home with 24/7 caregivers secondary to morbid obesity and multiple medical problems  Reportedly, has been unable to take her diuretic consistently over the past week or so secondary to lower blood pressures   Acute on chronic HFpEF a/e/b weight gain 6#, bibasilar crackles, and missed dosing diuretic prior to admit     PLAN:   IV lasix 50 mg BID  Daily weights, strict I/Os  Requiring up to 6 L O2 via NC, baseline is no oxygen  Respiratory protocol continue to wean oxygen as able

## 2024-11-08 NOTE — ASSESSMENT & PLAN NOTE
Body mass index is 74.36 kg/m².  Recommend incorporating a more whole foods plant-predominant diet along with decreasing consumption of red meats and processed foods  Per AHA guidelines, recommend moderate-vigorous intensity exercise for 30 minutes a day for 5 days a week or a total of 150 min/week  Bed-bound status

## 2024-11-08 NOTE — CASE MANAGEMENT
Case Management Assessment    Patient name Zee Kirk  Location /-01 MRN [id number]  : 1950 Date 2024       Current Admission Date: 2024  Current Admission Diagnosis:Acute on chronic heart failure with preserved ejection fraction (HFpEF) (Formerly [facility])   Patient Active Problem List    Diagnosis Date Noted Date Diagnosed    Acute on chronic heart failure with preserved ejection fraction (HFpEF) (Formerly [facility]) 2024     Hypoxic episode 2024     Type 2 diabetes mellitus without complication, without long-term current use of insulin (Formerly [facility]) 2024     Long term (current) use of anticoagulants 2024     Subtherapeutic international normalized ratio (INR) 2024     Multiple open wounds 2024     Chest discomfort 2024     Left leg pain 2023     Shingles 2023     Cellulitis- Ruled Out 2023     Longstanding persistent atrial fibrillation (Formerly [facility]) 2022     History of Clostridioides difficile infection 2022     Other specified hypothyroidism 10/03/2020     Mild episode of recurrent major depressive disorder (HCC) 10/03/2020     Idiopathic chronic 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 10/03/2020     Primary osteoarthritis involving multiple joints 10/03/2020     Class 3 severe obesity due to excess calories with serious comorbidity and body mass index (BMI) greater than or equal to 70 in adult (Formerly [facility]) 10/03/2020     Acute on chronic diastolic congestive heart failure (Formerly [facility]) 10/03/2020     Panniculitis 10/03/2020     Gastroesophageal reflux disease without esophagitis 10/03/2020     Hx MRSA infection 2020     Impaired mobility 2019     Osteoarthritis of glenohumeral joint 2019     Left ovarian cyst 2017     Depression with anxiety 07/15/2017     Anemia 2016     Adjustment disorder 2013     Irritable bowel syndrome 2012     Left atrial enlargement 2012     Left ventricular hypertrophy 2012      Carpal tunnel syndrome 05/30/2012     Gout 05/30/2012     Hyperlipidemia 05/30/2012     Symptomatic menopausal or female climacteric states 05/30/2012       LOS (days): 2  Geometric Mean LOS (GMLOS) (days): 3.9  Days to GMLOS:2     OBJECTIVE:    Risk of Unplanned Readmission Score: 29.31         Current admission status: Inpatient       Preferred Pharmacy:   [facility] [place] - [address]  [address]  [place] [address]  Phone: [phone] Fax: [phone]    Primary Care Provider: Franklyn Caruso MD    Primary Insurance: MEDICARE  Secondary Insurance: [employer]    ASSESSMENT:  Active Health Care Proxies       Robbie Richmond Alternate Health Care Representative - Nephew   Primary Phone: [phone] (Mobile)                 Advance Directives  Does patient have a Health Care POA?: Yes  Does patient have Advance Directives?: Yes  Advance Directives: Living will, Power of  for health care (nephew. need copy for chart)  Primary Contact: Robbie Richmond (Nephew              Patient Information  Admitted from:: Home  Mental Status: Alert  During Assessment patient was accompanied by: Not accompanied during assessment  Assessment information provided by:: Patient  Primary Caregiver: Private caregiver  Caregiver's Name:: [facility]ck[facility]la [facility] 8am-8p  Caregiver's Telephone Number:: [phone]  Support Systems: Home care staff  County of Residence: [facility] city do you live in?: [place]  Home entry access options. Select all that apply.: Ramp  Type of Current Residence: [address]  Upon entering residence, is there a bedroom on the main floor (no further steps)?: Yes  Upon entering residence, is there a bathroom on the main floor (no further steps)?: Yes  Living Arrangements: Lives Alone  Is patient a ?: No    Activities of Daily Living Prior to Admission  Functional Status: Total dependent  Completes ADLs independently?: No  Level of ADL dependence: Total  Dependent  Ambulates independently?: No  Level of ambulatory dependence: Total Dependent (bedbound)  Does patient use assisted devices?: Yes  Assisted Devices (DME) used: Bedside Commode, Walker, Hospital Bed, Stair Chair/[place]  Does patient currently own DME?: Yes  What DME does the patient currently own?: Bedside Commode, Stair Chair/Glide, Hospital Bed, Walker  Does patient have a history of Outpatient Therapy (PT/OT)?: Yes ([facility])  Does the patient have a history of Short-Term Rehab?: Yes ([facility], [facility])  Does patient have a history of HHC?: Yes ([facility])  Does patient currently have HHC?: No    Current Home Health Care  Type of Current Home Care Services: Home health aide (8am-8p)    Patient Information Continued  Does patient have prescription coverage?: Yes  Does patient receive dialysis treatments?: No  Does patient have a history of substance abuse?: No  Does patient have a history of Mental Health Diagnosis?: Yes  Is patient receiving treatment for mental health?: Yes (anxiety, depression. follows [facility] pcp)         Means of Transportation  Means of Transport to [facility]:: Other (Comment) (needs medical transport)  Cm met with the patient to evaluate the patients prior function and living situation and any barriers to d/c and form a safe d/c plan. Cm also evaluated the patient for any services in the home or needs for services. CM also discussed with patient that their preferences will be taken into account/consideration.  Pt admitted with CHF. Iv lasix, oxygen therapy. Pt does not use at baseline. Goal is to return to home with 12 hours daily aides. CM to follow.

## 2024-11-08 NOTE — PLAN OF CARE
Problem: PAIN - ADULT  Goal: Verbalizes/displays adequate comfort level or baseline comfort level  Description: Interventions:  - Encourage patient to monitor pain and request assistance  - Assess pain using appropriate pain scale  - Administer analgesics based on type and severity of pain and evaluate response  - Implement non-pharmacological measures as appropriate and evaluate response  - Consider cultural and social influences on pain and pain management  - Notify physician/advanced practitioner if interventions unsuccessful or patient reports new pain  Outcome: Progressing     Problem: INFECTION - ADULT  Goal: Absence or prevention of progression during hospitalization  Description: INTERVENTIONS:  - Assess and monitor for signs and symptoms of infection  - Monitor lab/diagnostic results  - Monitor all insertion sites, i.e. indwelling lines, tubes, and drains  - Monitor endotracheal if appropriate and nasal secretions for changes in amount and color  - [place] appropriate cooling/warming therapies per order  - Administer medications as ordered  - Instruct and encourage patient and family to use good hand hygiene technique  - Identify and instruct in appropriate isolation precautions for identified infection/condition  Outcome: Progressing     Problem: SAFETY ADULT  Goal: Patient will remain free of falls  Description: INTERVENTIONS:  - Educate patient/family on patient safety including physical limitations  - Instruct patient to call for assistance with activity   - Consult OT/PT to assist with strengthening/mobility   - Keep Call bell within reach  - Keep bed low and locked with side rails adjusted as appropriate  - Keep care items and personal belongings within reach  - Initiate and maintain comfort rounds  - Make Fall Risk Sign visible to staff  - Offer Toileting every 2 Hours, in advance of need  - Initiate/Maintain bed alarm  - Obtain necessary fall risk management equipment: socks  - Apply yellow  socks and bracelet for high fall risk patients  - Consider moving patient to room near nurses station  Outcome: Progressing     Problem: SAFETY ADULT  Goal: Maintain or return to baseline ADL function  Description: INTERVENTIONS:  -  Assess patient's ability to carry out ADLs; assess patient's baseline for ADL function and identify physical deficits which impact ability to perform ADLs (bathing, care of mouth/teeth, toileting, grooming, dressing, etc.)  - Assess/evaluate cause of self-care deficits   - Assess range of motion  - Assess patient's mobility; develop plan if impaired  - Assess patient's need for assistive devices and provide as appropriate  - Encourage maximum independence but intervene and supervise when necessary  - Involve family in performance of ADLs  - Assess for home care needs following discharge   - Consider OT consult to assist with ADL evaluation and planning for discharge  - Provide patient education as appropriate  Outcome: Progressing      Problem: Prexisting or High Potential for Compromised Skin Integrity  Goal: Skin integrity is maintained or improved  Description: INTERVENTIONS:  - Identify patients at risk for skin breakdown  - Assess and monitor skin integrity  - Assess and monitor nutrition and hydration status  - Monitor labs   - Assess for incontinence   - Turn and reposition patient  - Assist with mobility/ambulation  - Relieve pressure over bony prominences  - Avoid friction and shearing  - Provide appropriate hygiene as needed including keeping skin clean and dry  - Evaluate need for skin moisturizer/barrier cream  - Collaborate with interdisciplinary team   - Patient/family teaching  - Consider wound care consult   Outcome: Progressing

## 2024-11-08 NOTE — WOUND OSTOMY CARE
Consult Note - Wound   Zee Kirk 74 y.o. female MRN: [id number]  Unit/Bed#: -01 Encounter: [id number]      History and Present Illness:  74 year old female admitted 11/6/24 due to hypoxic episode. Complex medical history. BMI 74.36, 433 lbs.      Assessment Findings:   Seen for initial wound consult. Pt is known to wound care team. Supine in bed, she is on a bariatric low air loss mattress, and an ATR turning system. Uses a Pur Wik for bladder management.Patient had photos of wounds less than 18 hours ago, stated she has a very specific regime for her skin .  Uses Kenalog to rash areas and Bacitracin to open wounds.Has Kenalog will recommend Bacitracin to continue with established regime that patient is requesting continues.  1)Left hip previous site of Shingles partial thickness skin loss dry intact        2)Left hip previous suture site per pt pink partial thickness skin loss           3)LEFT BREAST pink partial thickness skin loss         4)RIGHT BREAST linear beefy red partial thickness skin loss    5)Mid abdominal fold beefy red partial thickness skin loss     6)ABD fold beefy red partial thickness skin loss     7)BACk intact dry . Pt states chronic . Neural dermatitis       Wound Care Plan:   1-Silicone cream/Hydraguard lotion to bilateral heels twice daily and as needed.  2-Elevate heels off of bed/chair surface to offload pressure.  3-Offloading air cushion in chair when out of bed.  4-Moisturize skin daily with skin nourishing cream.  5-Turn/reposition every 2 hours while in bed and weight shift frequently while in chair for pressure re-distribution on skin.  6-Kenalog as prescribed to rash areas.  7-Bacitracin to folds of breasts, axillary, abdomen, left  hip bid and prn     Wound 02/28/24 Other (comment) Abdomen Lateral;Right (Active)   Wound Image   11/07/24 1543   Wound 11/07/24 Back Medial;Upper (Active)   Wound Image   11/07/24 1542   Wound 11/07/24 Breast Lateral;Left (Active)    Wound Image   11/07/24 1545   Wound 11/07/24 Lateral;Left;Upper (Active)   Wound Image   11/07/24 1547       Wound 11/07/24 Hip Anterior;Left;Proximal (Active)   Wound Image    11/07/24 1548       Call or Secure Chat with any questions  Wound Care will continue to follow weekly    Bambi Johnsn RN CWON

## 2024-11-08 NOTE — ASSESSMENT & PLAN NOTE
Report of desaturation upon EMS arrival - 86% requiring 2 L  Transient desaturation to 88% on RA in ER  Weaned from 6 L back to 2 L; attempt to wean completely  May require O2 on discharge

## 2024-11-08 NOTE — ASSESSMENT & PLAN NOTE
Chronic wounds/lymphedema, presented with complaint of redness and slight pain in the fold/pannus right abdomen  On examination, appears more fungal in nature with satellite lesions however cannot rule out superimposed bacterial infection  Patient without SIRS criteria upon presentation  In ED, received broad-spectrum intravenous antimicrobials-vancomycin/cefepime  Hold off on further antibiotic therapy  Continue wound care, routine hygiene care, topical antifungal   Improving without further intervention

## 2024-11-09 LAB
ANION GAP SERPL CALCULATED.3IONS-SCNC: 6 MMOL/L (ref 4–13)
BUN SERPL-MCNC: 29 MG/DL (ref 5–25)
CALCIUM SERPL-MCNC: 8.1 MG/DL (ref 8.4–10.2)
CHLORIDE SERPL-SCNC: 98 MMOL/L (ref 96–108)
CO2 SERPL-SCNC: 32 MMOL/L (ref 21–32)
CREAT SERPL-MCNC: 1.15 MG/DL (ref 0.6–1.3)
GFR SERPL CREATININE-BSD FRML MDRD: 47 ML/MIN/1.73SQ M
GLUCOSE SERPL-MCNC: 102 MG/DL (ref 65–140)
GLUCOSE SERPL-MCNC: 103 MG/DL (ref 65–140)
GLUCOSE SERPL-MCNC: 115 MG/DL (ref 65–140)
GLUCOSE SERPL-MCNC: 136 MG/DL (ref 65–140)
GLUCOSE SERPL-MCNC: 137 MG/DL (ref 65–140)
INR PPP: 2.26 (ref 0.85–1.19)
INR PPP: 2.26 (ref 0.85–1.19)
MAGNESIUM SERPL-MCNC: 1.9 MG/DL (ref 1.9–2.7)
POTASSIUM SERPL-SCNC: 3.5 MMOL/L (ref 3.5–5.3)
PROTHROMBIN TIME: 25.3 SECONDS (ref 12.3–15)
PROTHROMBIN TIME: 25.3 SECONDS (ref 12.3–15)
SODIUM SERPL-SCNC: 136 MMOL/L (ref 135–147)

## 2024-11-09 PROCEDURE — 85610 PROTHROMBIN TIME: CPT

## 2024-11-09 PROCEDURE — 82948 REAGENT STRIP/BLOOD GLUCOSE: CPT

## 2024-11-09 PROCEDURE — 99232 SBSQ HOSP IP/OBS MODERATE 35: CPT | Performed by: PHYSICIAN ASSISTANT

## 2024-11-09 PROCEDURE — 83735 ASSAY OF MAGNESIUM: CPT | Performed by: PHYSICIAN ASSISTANT

## 2024-11-09 PROCEDURE — 80048 BASIC METABOLIC PNL TOTAL CA: CPT | Performed by: PHYSICIAN ASSISTANT

## 2024-11-09 RX ORDER — POLYETHYLENE GLYCOL 3350 17 G/17G
17 POWDER, FOR SOLUTION ORAL DAILY PRN
Status: DISCONTINUED | OUTPATIENT
Start: 2024-11-09 | End: 2024-11-10 | Stop reason: HOSPADM

## 2024-11-09 RX ADMIN — ALLOPURINOL 100 MG: 100 TABLET ORAL at 08:33

## 2024-11-09 RX ADMIN — GABAPENTIN 200 MG: 100 CAPSULE ORAL at 08:33

## 2024-11-09 RX ADMIN — BACITRACIN ZINC 1 SMALL APPLICATION: 500 OINTMENT TOPICAL at 08:44

## 2024-11-09 RX ADMIN — LEVOTHYROXINE SODIUM 137 MCG: 25 TABLET ORAL at 05:53

## 2024-11-09 RX ADMIN — POLYETHYLENE GLYCOL 3350 17 G: 17 POWDER, FOR SOLUTION ORAL at 16:39

## 2024-11-09 RX ADMIN — MIDODRINE HYDROCHLORIDE 5 MG: 5 TABLET ORAL at 16:39

## 2024-11-09 RX ADMIN — MIDODRINE HYDROCHLORIDE 5 MG: 5 TABLET ORAL at 06:01

## 2024-11-09 RX ADMIN — GABAPENTIN 200 MG: 100 CAPSULE ORAL at 21:51

## 2024-11-09 RX ADMIN — GABAPENTIN 200 MG: 100 CAPSULE ORAL at 16:39

## 2024-11-09 RX ADMIN — TRIAMCINOLONE ACETONIDE 1 APPLICATION: 5 CREAM TOPICAL at 08:43

## 2024-11-09 RX ADMIN — TRIAMCINOLONE ACETONIDE 1 APPLICATION: 5 CREAM TOPICAL at 16:40

## 2024-11-09 RX ADMIN — MIDODRINE HYDROCHLORIDE 5 MG: 5 TABLET ORAL at 11:48

## 2024-11-09 RX ADMIN — BACITRACIN ZINC 1 SMALL APPLICATION: 500 OINTMENT TOPICAL at 18:24

## 2024-11-09 RX ADMIN — FUROSEMIDE 50 MG: 10 INJECTION, SOLUTION INTRAMUSCULAR; INTRAVENOUS at 08:34

## 2024-11-09 NOTE — ASSESSMENT & PLAN NOTE
Body mass index is 73.24 kg/m².  Recommend incorporating a more whole foods plant-predominant diet along with decreasing consumption of red meats and processed foods  Per AHA guidelines, recommend moderate-vigorous intensity exercise for 30 minutes a day for 5 days a week or a total of 150 min/week  Bed-bound status

## 2024-11-09 NOTE — PLAN OF CARE
Problem: PAIN - ADULT  Goal: Verbalizes/displays adequate comfort level or baseline comfort level  Description: Interventions:  - Encourage patient to monitor pain and request assistance  - Assess pain using appropriate pain scale  - Administer analgesics based on type and severity of pain and evaluate response  - Implement non-pharmacological measures as appropriate and evaluate response  - Consider cultural and social influences on pain and pain management  - Notify physician/advanced practitioner if interventions unsuccessful or patient reports new pain  Outcome: Progressing     Problem: INFECTION - ADULT  Goal: Absence or prevention of progression during hospitalization  Description: INTERVENTIONS:  - Assess and monitor for signs and symptoms of infection  - Monitor lab/diagnostic results  - Monitor all insertion sites, i.e. indwelling lines, tubes, and drains  - Monitor endotracheal if appropriate and nasal secretions for changes in amount and color  - [place] appropriate cooling/warming therapies per order  - Administer medications as ordered  - Instruct and encourage patient and family to use good hand hygiene technique  - Identify and instruct in appropriate isolation precautions for identified infection/condition  Outcome: Progressing  Goal: Absence of fever/infection during neutropenic period  Description: INTERVENTIONS:  - Monitor WBC    Outcome: Progressing     Problem: SAFETY ADULT  Goal: Patient will remain free of falls  Description: INTERVENTIONS:  - Educate patient/family on patient safety including physical limitations  - Instruct patient to call for assistance with activity   - Consult OT/PT to assist with strengthening/mobility   - Keep Call bell within reach  - Keep bed low and locked with side rails adjusted as appropriate  - Keep care items and personal belongings within reach  - Initiate and maintain comfort rounds  - Make Fall Risk Sign visible to staff  - Offer Toileting every 2 Hours,  in advance of need  - Initiate/Maintain bed alarm  - Obtain necessary fall risk management equipment:  socks  - Apply yellow socks and bracelet for high fall risk patients  - Consider moving patient to room near nurses station  Outcome: Progressing  Goal: Maintain or return to baseline ADL function  Description: INTERVENTIONS:  -  Assess patient's ability to carry out ADLs; assess patient's baseline for ADL function and identify physical deficits which impact ability to perform ADLs (bathing, care of mouth/teeth, toileting, grooming, dressing, etc.)  - Assess/evaluate cause of self-care deficits   - Assess range of motion  - Assess patient's mobility; develop plan if impaired  - Assess patient's need for assistive devices and provide as appropriate  - Encourage maximum independence but intervene and supervise when necessary  - Involve family in performance of ADLs  - Assess for home care needs following discharge   - Consider OT consult to assist with ADL evaluation and planning for discharge  - Provide patient education as appropriate  Outcome: Progressing     Problem: DISCHARGE PLANNING  Goal: Discharge to home or other facility with appropriate resources  Description: INTERVENTIONS:  - Identify barriers to discharge w/patient and caregiver  - Arrange for needed discharge resources and transportation as appropriate  - Identify discharge learning needs (meds, wound care, etc.)  - Arrange for interpretive services to assist at discharge as needed  - Refer to Case Management Department for coordinating discharge planning if the patient needs post-hospital services based on physician/advanced practitioner order or complex needs related to functional status, cognitive ability, or social support system  Outcome: Progressing     Problem: Knowledge Deficit  Goal: Patient/family/caregiver demonstrates understanding of disease process, treatment plan, medications, and discharge instructions  Description: Complete  learning assessment and assess knowledge base.  Interventions:  - Provide teaching at level of understanding  - Provide teaching via preferred learning methods  Outcome: Progressing     Problem: Prexisting or High Potential for Compromised Skin Integrity  Goal: Skin integrity is maintained or improved  Description: INTERVENTIONS:  - Identify patients at risk for skin breakdown  - Assess and monitor skin integrity  - Assess and monitor nutrition and hydration status  - Monitor labs   - Assess for incontinence   - Turn and reposition patient  - Assist with mobility/ambulation  - Relieve pressure over bony prominences  - Avoid friction and shearing  - Provide appropriate hygiene as needed including keeping skin clean and dry  - Evaluate need for skin moisturizer/barrier cream  - Collaborate with interdisciplinary team   - Patient/family teaching  - Consider wound care consult   Outcome: Progressing

## 2024-11-09 NOTE — ASSESSMENT & PLAN NOTE
Report of desaturation upon EMS arrival - 86% requiring 2 L  Transient desaturation to 88% on RA in ER  Weaned from 6 L back to 2 L; weaned to RA today(11/9)  Patient noted to desat when sleeping- she reports history of DAVID and is not interested in having a sleep study.

## 2024-11-09 NOTE — PROGRESS NOTES
Progress Note - Hospitalist   Name: Zee Kirk 74 y.o. female I MRN: [id number]  Unit/Bed#: -01 I Date of Admission: 11/6/2024   Date of Service: 11/9/2024 I Hospital Day: 3    Assessment & Plan  Acute on chronic heart failure with preserved ejection fraction (HFpEF) (formerly [facility])  Wt Readings from Last 3 Encounters:   11/09/24 (!) 194 kg (426 lb 11.2 oz)   07/06/24 (!) 196 kg (432 lb 1.6 oz)   06/04/24 (!) 186 kg (410 lb)     Patient presented (11/6) with multiple complaints including generalized fatigue, shortness of breath, nonproductive cough, which has been persistent/progressive over the past several days  Patient mostly bedbound at home with 24/7 caregivers secondary to morbid obesity and multiple medical problems  Reportedly, has been unable to take her diuretic consistently over the past week or so secondary to lower blood pressures   Acute on chronic HFpEF a/e/b weight gain 6#, bibasilar crackles, and missed dosing diuretic prior to admit     PLAN:   Discontinue IV lasix and transition to home regimen of po lasix 60 mg BID  Daily weights, strict I/Os  Previously requiring up to 6 L O2 via NC- now weaned to RA  Patient medically stable for discharge- patient requires medical transportation home-  wasn't able to set up transportation until tomorrow 11/10 with 10:30 am  time  Hypoxic episode  Report of desaturation upon EMS arrival - 86% requiring 2 L  Transient desaturation to 88% on RA in ER  Weaned from 6 L back to 2 L; weaned to RA today(11/9)  Patient noted to desat when sleeping- she reports history of DAVID and is not interested in having a sleep study.   Panniculitis  Chronic wounds/lymphedema, presented with complaint of redness and slight pain in the fold/pannus right abdomen  On examination, appears more fungal in nature with satellite lesions however cannot rule out superimposed bacterial infection  Patient without SIRS criteria upon presentation  In ED, received broad-spectrum  intravenous antimicrobials-vancomycin/cefepime  Hold off on further antibiotic therapy  Continue wound care, routine hygiene care, topical antifungal   Improving without further intervention   Other specified hypothyroidism  Continue levothyroxine  Mild episode of recurrent major depressive disorder (HCC)  Continue Cymbalta  Class 3 severe obesity due to excess calories with serious comorbidity and body mass index (BMI) greater than or equal to 70 in adult ([facility])  Body mass index is 73.24 kg/m².  Recommend incorporating a more whole foods plant-predominant diet along with decreasing consumption of red meats and processed foods  Per AHA guidelines, recommend moderate-vigorous intensity exercise for 30 minutes a day for 5 days a week or a total of 150 min/week  Bed-bound status  Gastroesophageal reflux disease without esophagitis  Continue PPI  Longstanding persistent atrial fibrillation ([facility])  Chronic condition/stable  Rate controlled, on Coumadin for anticoagulation  Therapeutic INR upon presentation  Type 2 diabetes mellitus without complication, without long-term current use of insulin ([facility])  Blood Sugar Average: Last 72 hrs:(P) 127.8  Patient not maintained on antihyperglycemic's in the outpatient setting  Patient is adamant that she does not have diabetes, A1c 7.2%  Idiopathic chronic 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  Chronic condition/stable  Does not appear to have gouty flare at this juncture  Continue to monitor    VTE Pharmacologic Prophylaxis: VTE Score: 8 High Risk (Score >/= 5) - Pharmacological DVT Prophylaxis Ordered: warfarin (Coumadin). Sequential Compression Devices Ordered.    Mobility:   Basic Mobility Inpatient Raw Score: 8  JH-HLM Goal: 3: Sit at edge of bed  JH-HLM Achieved: 1: Laying in bed  JH-HLM Goal NOT achieved. Continue with multidisciplinary rounding and encourage appropriate mobility to improve upon JH-HLM goals.    Patient Centered Rounds: I performed bedside rounds with nursing  staff today.   Discussions with Specialists or Other Care Team Provider: nursing, CM    Education and Discussions with Family / Patient:  patient updated at bedside.     Current Length of Stay: 3 day(s)  Current Patient Status: Inpatient   Certification Statement: The patient will continue to require additional inpatient hospital stay due to need for discharge planning  Discharge Plan: Anticipate discharge tomorrow to home with home services.    Code Status: Level 1 - Full Code    Subjective   The patient was seen and examined. The patient is lying in bed in no acute distress. She reports feeling well overall. She was weaned off oxygen this morning with O2 saturation of 96%. Patient reports having a history of sleep apnea but is not interested in having a sleep study.     Objective :  Temp:  [97.8 °F (36.6 °C)-98 °F (36.7 °C)] 97.9 °F (36.6 °C)  HR:  [58-63] 62  BP: ()/(52-57) 117/55  Resp:  [16-18] 16  SpO2:  [84 %-95 %] 94 %  O2 Device: Nasal cannula  Nasal Cannula O2 Flow Rate (L/min):  [1 L/min] 1 L/min    Body mass index is 73.24 kg/m².     Input and Output Summary (last 24 hours):     Intake/Output Summary (Last 24 hours) at 11/9/2024 1219  Last data filed at 11/9/2024 0604  Gross per 24 hour   Intake 720 ml   Output 1300 ml   Net -580 ml       Physical Exam  Vitals and nursing note reviewed.   Constitutional:       General: She is awake.      Appearance: She is morbidly obese.   HENT:      Head: Normocephalic and atraumatic.   Cardiovascular:      Rate and Rhythm: Normal rate and regular rhythm.      Heart sounds: Normal heart sounds.   Pulmonary:      Effort: Pulmonary effort is normal.      Breath sounds: Normal breath sounds.   Abdominal:      Palpations: Abdomen is soft.      Tenderness: There is no abdominal tenderness.   Skin:     Comments: Mild erythema noted under skin folds   Neurological:      General: No focal deficit present.      Mental Status: She is alert and oriented to person, place,  and time.   Psychiatric:         Attention and Perception: Attention normal.         Mood and Affect: Mood normal.         Speech: Speech normal.         Behavior: Behavior is cooperative.           Lines/Drains:  Lines/Drains/Airways       Active Status       Name Placement date Placement time Site Days    External Urinary Catheter 11/06/24 2350  -- 2                            Lab Results: I have reviewed the following results:   Results from last 7 days   Lab Units 11/08/24  0549 11/07/24 0619 11/06/24 1933   WBC Thousand/uL 12.83*   < > 15.97*   HEMOGLOBIN g/dL 9.4*   < > 10.4*   HEMATOCRIT % 32.6*   < > 35.9   PLATELETS Thousands/uL 417*   < > 437*   SEGS PCT %  --   --  87*   LYMPHO PCT %  --   --  6*   MONO PCT %  --   --  4   EOS PCT %  --   --  2    < > = values in this interval not displayed.     Results from last 7 days   Lab Units 11/09/24  0554 11/07/24 0619 11/06/24 1933   SODIUM mmol/L 136   < > 135   POTASSIUM mmol/L 3.5   < > 3.8   CHLORIDE mmol/L 98   < > 97   CO2 mmol/L 32   < > 32   BUN mg/dL 29*   < > 31*   CREATININE mg/dL 1.15   < > 1.22   ANION GAP mmol/L 6   < > 6   CALCIUM mg/dL 8.1*   < > 7.9*   ALBUMIN g/dL  --   --  2.6*   TOTAL BILIRUBIN mg/dL  --   --  0.46   ALK PHOS U/L  --   --  86   ALT U/L  --   --  8   AST U/L  --   --  13   GLUCOSE RANDOM mg/dL 103   < > 167*    < > = values in this interval not displayed.     Results from last 7 days   Lab Units 11/09/24  0554   INR  2.26*  2.26*     Results from last 7 days   Lab Units 11/09/24  1100 11/09/24  0704 11/08/24  2118 11/08/24  1623 11/08/24  1113 11/08/24  0712 11/07/24  2042 11/07/24  1622 11/07/24  1130 11/06/24  2143   POC GLUCOSE mg/dl 137 102 137 128 126 104 152* 111 106 175*     Results from last 7 days   Lab Units 11/06/24  1933   HEMOGLOBIN A1C % 7.2*     Results from last 7 days   Lab Units 11/06/24 1933   PROCALCITONIN ng/ml 0.09       Recent Cultures (last 7 days):         Imaging Results Review: No pertinent  imaging studies reviewed.  Other Study Results Review: No additional pertinent studies reviewed.    Last 24 Hours Medication List:     Current Facility-Administered Medications:     acetaminophen (TYLENOL) tablet 650 mg, Q4H PRN    allopurinol (ZYLOPRIM) tablet 100 mg, Daily    bacitracin topical ointment 1 small application, BID    DULoxetine (CYMBALTA) delayed release capsule 20 mg, Daily    furosemide (LASIX) tablet 60 mg, BID (diuretic)    gabapentin (NEURONTIN) capsule 200 mg, TID    HYDROmorphone (DILAUDID) injection 0.5 mg, Q1H PRN    insulin lispro (HumALOG/ADMELOG) 100 units/mL subcutaneous injection 4-20 Units, TID AC **AND** Fingerstick Glucose (POCT), TID AC    insulin lispro (HumALOG/ADMELOG) 100 units/mL subcutaneous injection 4-20 Units, HS    levothyroxine tablet 137 mcg, Early Morning    midodrine (PROAMATINE) tablet 5 mg, TID AC    oxyCODONE (ROXICODONE) immediate release tablet 10 mg, Q4H PRN    oxyCODONE (ROXICODONE) IR tablet 5 mg, Q4H PRN    triamcinolone (KENALOG) 0.5 % cream 1 Application, TID    Administrative Statements   Today, Patient Was Seen By: Kishan Jones PA-C  I have spent a total time of 35 minutes in caring for this patient on the day of the visit/encounter including Documenting in the medical record, Reviewing / ordering tests, medicine, procedures  , and Obtaining or reviewing history  .    **Please Note: This note may have been constructed using a voice recognition system.**

## 2024-11-09 NOTE — ASSESSMENT & PLAN NOTE
Blood Sugar Average: Last 72 hrs:(P) 127.8  Patient not maintained on antihyperglycemic's in the outpatient setting  Patient is adamant that she does not have diabetes, A1c 7.2%

## 2024-11-09 NOTE — ASSESSMENT & PLAN NOTE
Wt Readings from Last 3 Encounters:   11/09/24 (!) 194 kg (426 lb 11.2 oz)   07/06/24 (!) 196 kg (432 lb 1.6 oz)   06/04/24 (!) 186 kg (410 lb)     Patient presented (11/6) with multiple complaints including generalized fatigue, shortness of breath, nonproductive cough, which has been persistent/progressive over the past several days  Patient mostly bedbound at home with 24/7 caregivers secondary to morbid obesity and multiple medical problems  Reportedly, has been unable to take her diuretic consistently over the past week or so secondary to lower blood pressures   Acute on chronic HFpEF a/e/b weight gain 6#, bibasilar crackles, and missed dosing diuretic prior to admit     PLAN:   Discontinue IV lasix and transition to home regimen of po lasix 60 mg BID  Daily weights, strict I/Os  Previously requiring up to 6 L O2 via NC- now weaned to RA  Patient medically stable for discharge- patient requires medical transportation home- NABEEL wasn't able to set up transportation until tomorrow 11/10 with 10:30 am  time

## 2024-11-10 VITALS
DIASTOLIC BLOOD PRESSURE: 54 MMHG | BODY MASS INDEX: 50.02 KG/M2 | SYSTOLIC BLOOD PRESSURE: 105 MMHG | TEMPERATURE: 97.8 F | OXYGEN SATURATION: 93 % | WEIGHT: 293 LBS | RESPIRATION RATE: 19 BRPM | HEART RATE: 57 BPM | HEIGHT: 64 IN

## 2024-11-10 LAB
GLUCOSE SERPL-MCNC: 104 MG/DL (ref 65–140)
INR PPP: 1.94 (ref 0.85–1.19)
PROTHROMBIN TIME: 22.5 SECONDS (ref 12.3–15)

## 2024-11-10 PROCEDURE — 85610 PROTHROMBIN TIME: CPT

## 2024-11-10 PROCEDURE — 99238 HOSP IP/OBS DSCHRG MGMT 30/<: CPT | Performed by: PHYSICIAN ASSISTANT

## 2024-11-10 PROCEDURE — 82948 REAGENT STRIP/BLOOD GLUCOSE: CPT

## 2024-11-10 RX ADMIN — LEVOTHYROXINE SODIUM 137 MCG: 25 TABLET ORAL at 05:56

## 2024-11-10 RX ADMIN — GABAPENTIN 200 MG: 100 CAPSULE ORAL at 09:17

## 2024-11-10 RX ADMIN — TRIAMCINOLONE ACETONIDE 1 APPLICATION: 5 CREAM TOPICAL at 09:18

## 2024-11-10 RX ADMIN — ALLOPURINOL 100 MG: 100 TABLET ORAL at 09:18

## 2024-11-10 RX ADMIN — MIDODRINE HYDROCHLORIDE 5 MG: 5 TABLET ORAL at 06:00

## 2024-11-10 RX ADMIN — BACITRACIN ZINC 1 SMALL APPLICATION: 500 OINTMENT TOPICAL at 09:18

## 2024-11-10 NOTE — CASE MANAGEMENT
Case Management Discharge Planning Note    Patient name Zee Kirk  Location /-01 MRN [id number]  : 1950 Date 2024       Current Admission Date: 2024  Current Admission Diagnosis:Acute on chronic heart failure with preserved ejection fraction (HFpEF) ([facility])   Patient Active Problem List    Diagnosis Date Noted Date Diagnosed    Acute on chronic heart failure with preserved ejection fraction (HFpEF) ([facility]) 2024     Hypoxic episode 2024     Type 2 diabetes mellitus without complication, without long-term current use of insulin ([facility]) 2024     Long term (current) use of anticoagulants 2024     Subtherapeutic international normalized ratio (INR) 2024     Multiple open wounds 2024     Chest discomfort 2024     Left leg pain 2023     Shingles 2023     Cellulitis- Ruled Out 2023     Longstanding persistent atrial fibrillation ([facility]) 2022     History of Clostridioides difficile infection 2022     Other specified hypothyroidism 10/03/2020     Mild episode of recurrent major depressive disorder (HCC) 10/03/2020     Idiopathic chronic 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 10/03/2020     Primary osteoarthritis involving multiple joints 10/03/2020     Class 3 severe obesity due to excess calories with serious comorbidity and body mass index (BMI) greater than or equal to 70 in adult ([facility]) 10/03/2020     Acute on chronic diastolic congestive heart failure ([facility]) 10/03/2020     Panniculitis 10/03/2020     Gastroesophageal reflux disease without esophagitis 10/03/2020     Hx MRSA infection 2020     Impaired mobility 2019     Osteoarthritis of glenohumeral joint 2019     Left ovarian cyst 2017     Depression with anxiety 07/15/2017     Anemia 2016     Adjustment disorder 2013     Irritable bowel syndrome 2012     Left atrial enlargement 2012     Left ventricular hypertrophy  12/04/2012     Carpal tunnel syndrome 05/30/2012     Gout 05/30/2012     Hyperlipidemia 05/30/2012     Symptomatic menopausal or female climacteric states 05/30/2012       LOS (days): 3  Geometric Mean LOS (GMLOS) (days): 3.9  Days to GMLOS:1     OBJECTIVE:  Risk of Unplanned Readmission Score: 29.15         Current admission status: Inpatient   Preferred Pharmacy:   [facility] [place] - [facility]  [place] [address]  Phone: [phone] Fax: [phone]    Primary Care Provider: Franklyn Caruso MD    Primary Insurance: MEDICARE  Secondary Insurance: AARP    DISCHARGE DETAILS:    Discharge planning discussed with:: patient & Byron was caleld at 10:30 am & 11: 04 am & 11:17 am  Freedom of Choice: Yes  Comments - Freedom of Choice: pt declines any additional d/c needs- pt's caregiver will be at the home tomorrow at 10:30am -                               Other Referral/Resources/Interventions Provided:  Referral Comments: pt needs transportation- [facility] was not able to transport today because they need to have two crews - they will be able to get two crews together tomorrow at 10:30 am    Would you like to participate in our [facility] service program?  : No - Declined    Treatment Team Recommendation: Home (home with family support & [facility] Caregivers- 10:30 am [facility])                                   IMM Given (Date):: 11/09/24  IMM Given to:: Patient

## 2024-11-10 NOTE — DISCHARGE SUMMARY
Discharge Summary - Hospitalist   Name: Zee Kirk 74 y.o. female I MRN: [id number]  Unit/Bed#: -01 I Date of Admission: 11/6/2024   Date of Service: 11/10/2024 I Hospital Day: 4     Assessment & Plan  Acute on chronic heart failure with preserved ejection fraction (HFpEF) ([facility])  Wt Readings from Last 3 Encounters:   11/10/24 (!) 193 kg (424 lb 12.8 oz)   07/06/24 (!) 196 kg (432 lb 1.6 oz)   06/04/24 (!) 186 kg (410 lb)     Patient presented (11/6) with multiple complaints including generalized fatigue, shortness of breath, nonproductive cough, which has been persistent/progressive over the past several days  Patient mostly bedbound at home with 24/7 caregivers secondary to morbid obesity and multiple medical problems  Reportedly, has been unable to take her diuretic consistently over the past week or so secondary to lower blood pressures   Acute on chronic HFpEF a/e/b weight gain 6#, bibasilar crackles, and missed dosing diuretic prior to admit     PLAN:   The patient received IV lasix and transition to home regimen of po lasix 60 mg BID starting 11/10/24  Daily weights, strict I/Os  Previously requiring up to 6 L O2 via NC- now weaned to RA  Patient has lost 12 lbs since admission  Outpatient follow-up with PCP  Hypoxic episode  Report of desaturation upon EMS arrival - 86% requiring 2 L  Transient desaturation to 88% on RA in ER  Weaned from 6 L back to 2 L; weaned to RA on 11/9/24  Patient noted to desat when sleeping- she reports history of DAVID and is not interested in having a sleep study.   Panniculitis  Chronic wounds/lymphedema, presented with complaint of redness and slight pain in the fold/pannus right abdomen  On examination, appears more fungal in nature with satellite lesions however cannot rule out superimposed bacterial infection  Patient without SIRS criteria upon presentation  In ED, received broad-spectrum intravenous antimicrobials-vancomycin/cefepime  Hold off on further  antibiotic therapy  Continue wound care, routine hygiene care, topical antifungal   Improving without further intervention   Other specified hypothyroidism  Continue levothyroxine  Mild episode of recurrent major depressive disorder (HCC)  Continue Cymbalta  Class 3 severe obesity due to excess calories with serious comorbidity and body mass index (BMI) greater than or equal to 70 in adult ([facility])  Body mass index is 72.92 kg/m².  Recommend incorporating a more whole foods plant-predominant diet along with decreasing consumption of red meats and processed foods  Per AHA guidelines, recommend moderate-vigorous intensity exercise for 30 minutes a day for 5 days a week or a total of 150 min/week  Bed-bound status  Gastroesophageal reflux disease without esophagitis  Continue PPI  Longstanding persistent atrial fibrillation ([facility])  Chronic condition/stable  Rate controlled, on Coumadin for anticoagulation  Therapeutic INR upon presentation  Type 2 diabetes mellitus without complication, without long-term current use of insulin ([facility])  Blood Sugar Average: Last 72 hrs:(P) 121.5  Patient not maintained on antihyperglycemic's in the outpatient setting  Patient is adamant that she does not have diabetes, A1c 7.2%  Patient discharged home with glucometer, test strips, and lancets.   Outpatient follow-up with PCP     Medical Problems       Resolved Problems  Date Reviewed: 1/19/2024            Resolved    Lymphedema of extremity 11/6/2024     Resolved by  Tres Brown DO    Ambulatory dysfunction 11/6/2024     Resolved by  Tres Brown DO        Discharging Physician / Practitioner: Kishan Jones PA-C  PCP: Franklyn Caruso MD  Admission Date:   Admission Orders (From admission, onward)       Ordered        11/06/24 2046  INPATIENT ADMISSION  Once                          Discharge Date: 11/10/24    Consultations During Hospital Stay:  none    Procedures Performed:   none    Significant Findings / Test  Results:   XR chest 1 view portable    Result Date: 11/7/2024  Impression: No focal consolidation. Resident: BLAIRE Arreola I, the attending radiologist, have reviewed the images and agree with the final report above. Workstation performed: [id number]        Incidental Findings:   none     Test Results Pending at Discharge (will require follow up):   none     Outpatient Tests Requested:  none    Complications:  none    Reason for Admission: acute on chronic CHF, hypoxia     Hospital Course:   Zee Kirk is a 74 y.o. female patient who originally presented to the hospital on 11/6/2024 due to shortness of breath and weakness. Please see H&P by Dr. Brown dated 11/6/2024 for complete details of presentation. The patient was noted to require 2 L O2 given O2 saturation of 86%. In the ED there was concerns for panniculitis and she received IV vancomycin/cefepime in the ED. This was not continued on admission as it appeared more fungal in nature. She was started on IV lasix . The patient's oxygen requirements increased to 6 L O2. The patient diuresed well and her oxygen was decreased to 2 L then eventually weaned to RA. The patient was noted to desaturate while sleeping. The patient did report history of sleep apnea but states she was not going to have a sleep study. The patient's pannus erythema/edema continued to improve. The patient was down 12 lbs from admission. She was cleared for discharge home on 11/9/24 however this was delayed until 11/10/24 due to patient needing ambulance transport home with 2 crews to be available to get the patient safely into her house. The patient was instructed to follow-up with her PCP.  This is a brief discharge summary, please see notes from throughout the patient's hospital stay for complete details of her hospitalization.         Please see above list of diagnoses and related plan for additional information.     Condition at Discharge: good    Discharge Day Visit / Exam:  "  Subjective:    Vitals: Blood Pressure: 105/54 (11/10/24 0705)  Pulse: 57 (11/10/24 0705)  Temperature: 97.8 °F (36.6 °C) (11/10/24 0705)  Temp Source: Oral (11/10/24 0705)  Respirations: 19 (11/10/24 0705)  Height: 5' 4\" (162.6 cm) (11/07/24 0721)  Weight - Scale: (!) 193 kg (424 lb 12.8 oz) (11/10/24 0600)  SpO2: 94 % (11/10/24 0705)  Physical Exam  Vitals and nursing note reviewed.   Constitutional:       Appearance: Normal appearance.   HENT:      Head: Normocephalic and atraumatic.      Nose: Nose normal.      Mouth/Throat:      Mouth: Mucous membranes are dry.   Cardiovascular:      Pulses: Normal pulses.      Heart sounds: Normal heart sounds.   Pulmonary:      Breath sounds: Normal breath sounds.   Abdominal:      General: Bowel sounds are normal. There is no distension.      Palpations: Abdomen is soft.      Tenderness: There is no abdominal tenderness.   Skin:     General: Skin is warm and dry.   Neurological:      General: No focal deficit present.      Mental Status: She is alert and oriented to person, place, and time.   Psychiatric:         Mood and Affect: Mood normal.         Behavior: Behavior normal.          Discussion with Family: Patient declined call to .     Discharge instructions/Information to patient and family:   See after visit summary for information provided to patient and family.      Provisions for Follow-Up Care:  See after visit summary for information related to follow-up care and any pertinent home health orders.      Mobility at time of Discharge:   Basic Mobility Inpatient Raw Score: 8  -HLM Goal: 3: Sit at edge of bed  JH-HLM Achieved: 2: Bed activities/Dependent transfer  HLM Goal NOT achieved. Continue to encourage mobility in post discharge setting.     Disposition:   Home    Planned Readmission: no    Discharge Medications:  See after visit summary for reconciled discharge medications provided to patient and/or family.      Administrative Statements "   Discharge Statement:  I have spent a total time of 25 minutes in caring for this patient on the day of the visit/encounter. >30 minutes of time was spent on: Counseling / Coordination of care and Documenting in the medical record.    **Please Note: This note may have been constructed using a voice recognition system**

## 2024-11-10 NOTE — PLAN OF CARE
Problem: PAIN - ADULT  Goal: Verbalizes/displays adequate comfort level or baseline comfort level  Description: Interventions:  - Encourage patient to monitor pain and request assistance  - Assess pain using appropriate pain scale  - Administer analgesics based on type and severity of pain and evaluate response  - Implement non-pharmacological measures as appropriate and evaluate response  - Consider cultural and social influences on pain and pain management  - Notify physician/advanced practitioner if interventions unsuccessful or patient reports new pain  Outcome: Progressing     Problem: INFECTION - ADULT  Goal: Absence or prevention of progression during hospitalization  Description: INTERVENTIONS:  - Assess and monitor for signs and symptoms of infection  - Monitor lab/diagnostic results  - Monitor all insertion sites, i.e. indwelling lines, tubes, and drains  - Monitor endotracheal if appropriate and nasal secretions for changes in amount and color  - [place] appropriate cooling/warming therapies per order  - Administer medications as ordered  - Instruct and encourage patient and family to use good hand hygiene technique  - Identify and instruct in appropriate isolation precautions for identified infection/condition  Outcome: Progressing  Goal: Absence of fever/infection during neutropenic period  Description: INTERVENTIONS:  - Monitor WBC    Outcome: Progressing     Problem: SAFETY ADULT  Goal: Patient will remain free of falls  Description: INTERVENTIONS:  - Educate patient/family on patient safety including physical limitations  - Instruct patient to call for assistance with activity   - Consult OT/PT to assist with strengthening/mobility   - Keep Call bell within reach  - Keep bed low and locked with side rails adjusted as appropriate  - Keep care items and personal belongings within reach  - Initiate and maintain comfort rounds  - Make Fall Risk Sign visible to staff  - Offer Toileting every 2 Hours,  in advance of need  - Initiate/Maintain bed alarm  - Obtain necessary fall risk management equipment:  socks  - Apply yellow socks and bracelet for high fall risk patients  - Consider moving patient to room near nurses station  Outcome: Progressing  Goal: Maintain or return to baseline ADL function  Description: INTERVENTIONS:  -  Assess patient's ability to carry out ADLs; assess patient's baseline for ADL function and identify physical deficits which impact ability to perform ADLs (bathing, care of mouth/teeth, toileting, grooming, dressing, etc.)  - Assess/evaluate cause of self-care deficits   - Assess range of motion  - Assess patient's mobility; develop plan if impaired  - Assess patient's need for assistive devices and provide as appropriate  - Encourage maximum independence but intervene and supervise when necessary  - Involve family in performance of ADLs  - Assess for home care needs following discharge   - Consider OT consult to assist with ADL evaluation and planning for discharge  - Provide patient education as appropriate  Outcome: Progressing     Problem: DISCHARGE PLANNING  Goal: Discharge to home or other facility with appropriate resources  Description: INTERVENTIONS:  - Identify barriers to discharge w/patient and caregiver  - Arrange for needed discharge resources and transportation as appropriate  - Identify discharge learning needs (meds, wound care, etc.)  - Arrange for interpretive services to assist at discharge as needed  - Refer to Case Management Department for coordinating discharge planning if the patient needs post-hospital services based on physician/advanced practitioner order or complex needs related to functional status, cognitive ability, or social support system  Outcome: Progressing     Problem: Knowledge Deficit  Goal: Patient/family/caregiver demonstrates understanding of disease process, treatment plan, medications, and discharge instructions  Description: Complete  learning assessment and assess knowledge base.  Interventions:  - Provide teaching at level of understanding  - Provide teaching via preferred learning methods  Outcome: Progressing     Problem: Prexisting or High Potential for Compromised Skin Integrity  Goal: Skin integrity is maintained or improved  Description: INTERVENTIONS:  - Identify patients at risk for skin breakdown  - Assess and monitor skin integrity  - Assess and monitor nutrition and hydration status  - Monitor labs   - Assess for incontinence   - Turn and reposition patient  - Assist with mobility/ambulation  - Relieve pressure over bony prominences  - Avoid friction and shearing  - Provide appropriate hygiene as needed including keeping skin clean and dry  - Evaluate need for skin moisturizer/barrier cream  - Collaborate with interdisciplinary team   - Patient/family teaching  - Consider wound care consult   Outcome: Progressing

## 2024-11-10 NOTE — PLAN OF CARE
Problem: Prexisting or High Potential for Compromised Skin Integrity  Goal: Skin integrity is maintained or improved  Description: INTERVENTIONS:  - Identify patients at risk for skin breakdown  - Assess and monitor skin integrity  - Assess and monitor nutrition and hydration status  - Monitor labs   - Assess for incontinence   - Turn and reposition patient  - Assist with mobility/ambulation  - Relieve pressure over bony prominences  - Avoid friction and shearing  - Provide appropriate hygiene as needed including keeping skin clean and dry  - Evaluate need for skin moisturizer/barrier cream  - Collaborate with interdisciplinary team   - Patient/family teaching  - Consider wound care consult   Outcome: Progressing     Problem: Knowledge Deficit  Goal: Patient/family/caregiver demonstrates understanding of disease process, treatment plan, medications, and discharge instructions  Description: Complete learning assessment and assess knowledge base.  Interventions:  - Provide teaching at level of understanding  - Provide teaching via preferred learning methods  Outcome: Progressing

## 2024-11-10 NOTE — CASE MANAGEMENT
Case Management Discharge Planning Note    Patient name Zee Kirk  Location /-01 MRN [id number]  : 1950 Date 11/10/2024       Current Admission Date: 2024  Current Admission Diagnosis:Acute on chronic heart failure with preserved ejection fraction (HFpEF) ([facility])   Patient Active Problem List    Diagnosis Date Noted Date Diagnosed    Acute on chronic heart failure with preserved ejection fraction (HFpEF) ([facility]) 2024     Hypoxic episode 2024     Type 2 diabetes mellitus without complication, without long-term current use of insulin ([facility]) 2024     Long term (current) use of anticoagulants 2024     Subtherapeutic international normalized ratio (INR) 2024     Multiple open wounds 2024     Chest discomfort 2024     Left leg pain 2023     Shingles 2023     Cellulitis- Ruled Out 2023     Longstanding persistent atrial fibrillation ([facility]) 2022     History of Clostridioides difficile infection 2022     Other specified hypothyroidism 10/03/2020     Mild episode of recurrent major depressive disorder (HCC) 10/03/2020     Idiopathic chronic 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 10/03/2020     Primary osteoarthritis involving multiple joints 10/03/2020     Class 3 severe obesity due to excess calories with serious comorbidity and body mass index (BMI) greater than or equal to 70 in adult ([facility]) 10/03/2020     Acute on chronic diastolic congestive heart failure ([facility]) 10/03/2020     Panniculitis 10/03/2020     Gastroesophageal reflux disease without esophagitis 10/03/2020     Hx MRSA infection 2020     Impaired mobility 2019     Osteoarthritis of glenohumeral joint 2019     Left ovarian cyst 2017     Depression with anxiety 07/15/2017     Anemia 2016     Adjustment disorder 2013     Irritable bowel syndrome 2012     Left atrial enlargement 2012     Left ventricular hypertrophy  12/04/2012     Carpal tunnel syndrome 05/30/2012     Gout 05/30/2012     Hyperlipidemia 05/30/2012     Symptomatic menopausal or female climacteric states 05/30/2012       LOS (days): 4  Geometric Mean LOS (GMLOS) (days): 3.9  Days to GMLOS:0.4     OBJECTIVE:  Risk of Unplanned Readmission Score: 29.65         Current admission status: Inpatient   Preferred Pharmacy:   [facility] [place] - [address]  [place] [address]  Phone: [phone] Fax: [phone]    Primary Care Provider: Franklyn Caruso MD    Primary Insurance: MEDICARE  Secondary Insurance: AARP    DISCHARGE DETAILS:    Discharge planning discussed with:: cm called [facility] at 8:26 am & Robbie( nephew ) at 9:25 am   Freedom of Choice: Yes  Comments - Freedom of Choice: pt will continue with her caregiver from [facility] care- pt denies any addition d/c needs  CM contacted family/caregiver?: Yes (cm LM for him to call to discuss d/ c needs)  Were Treatment Team discharge recommendations reviewed with patient/caregiver?: Yes (pt)  Did patient/caregiver verbalize understanding of patient care needs?: Yes  Were patient/caregiver advised of the risks associated with not following Treatment Team discharge recommendations?: Yes (pt)    Contacts  Patient Contacts: Robbie Richmond  & [facility] caregivers  Relationship to Patient:: Other (Comment) (LM for nephew - called Ebonieemmakelly)  Contact Method: Phone  Phone Number: [phone](nephew)     -[phone]  ( [facility] caregivers) cm was told Fay caregiver will be at the home for 10:30 am  Reason/Outcome: Discharge Planning    Requested Home Health Care         Is the patient interested in HHC at discharge?: No    DME Referral Provided  Referral made for DME?: No    Other Referral/Resources/Interventions Provided:  Interventions: Other (Specify)  Referral Comments: pt has a BLS 10:30 am transport - caregiver is aware and will be at th home - message was left for her  nephew    Would you like to participate in our [facility] service program?  : No - Declined    Treatment Team Recommendation: Home (home wiht family support & [facility] care  caregiver & outpt follow up -  10:30 am [facility])  Discharge Destination Plan:: Home (home with family support & [facility] care  caregiver & outpt follow up- 10:30 am [facility])  Transport at Discharge : [facility]     Number/Name of Dispatcher: [phone]  Transported by (Company and Unit #): [place]  ETA of Transport (Date): 11/10/24  ETA of Transport (Time): 1030         AVS will be faxed to [facility] as requested to [phone]           Family notified:: nephew was called cm LM for him to call to discuss d/c plan - pt stated yesterday she called her nephew

## 2024-11-10 NOTE — ASSESSMENT & PLAN NOTE
Blood Sugar Average: Last 72 hrs:(P) 121.5  Patient not maintained on antihyperglycemic's in the outpatient setting  Patient is adamant that she does not have diabetes, A1c 7.2%  Patient discharged home with glucometer, test strips, and lancets.   Outpatient follow-up with PCP

## 2024-11-10 NOTE — ASSESSMENT & PLAN NOTE
Wt Readings from Last 3 Encounters:   11/10/24 (!) 193 kg (424 lb 12.8 oz)   07/06/24 (!) 196 kg (432 lb 1.6 oz)   06/04/24 (!) 186 kg (410 lb)     Patient presented (11/6) with multiple complaints including generalized fatigue, shortness of breath, nonproductive cough, which has been persistent/progressive over the past several days  Patient mostly bedbound at home with 24/7 caregivers secondary to morbid obesity and multiple medical problems  Reportedly, has been unable to take her diuretic consistently over the past week or so secondary to lower blood pressures   Acute on chronic HFpEF a/e/b weight gain 6#, bibasilar crackles, and missed dosing diuretic prior to admit     PLAN:   The patient received IV lasix and transition to home regimen of po lasix 60 mg BID starting 11/10/24  Daily weights, strict I/Os  Previously requiring up to 6 L O2 via NC- now weaned to RA  Patient has lost 12 lbs since admission  Outpatient follow-up with PCP

## 2024-11-10 NOTE — ASSESSMENT & PLAN NOTE
Report of desaturation upon EMS arrival - 86% requiring 2 L  Transient desaturation to 88% on RA in ER  Weaned from 6 L back to 2 L; weaned to RA on 11/9/24  Patient noted to desat when sleeping- she reports history of DAVID and is not interested in having a sleep study.

## 2024-11-10 NOTE — ASSESSMENT & PLAN NOTE
Body mass index is 72.92 kg/m².  Recommend incorporating a more whole foods plant-predominant diet along with decreasing consumption of red meats and processed foods  Per AHA guidelines, recommend moderate-vigorous intensity exercise for 30 minutes a day for 5 days a week or a total of 150 min/week  Bed-bound status

## 2024-11-10 NOTE — NURSING NOTE
Patient discharged to home. Patient refused AM care and wound treatment prior to D/C. Patient transferred to [facility] with slide board. Patient has all follow up and medication changes. Patient offered encouragement with diet and medications. Patient has all belongings

## 2024-11-13 ENCOUNTER — TELEPHONE (OUTPATIENT)
Dept: CARDIOLOGY CLINIC | Facility: CLINIC | Age: 74
End: 2024-11-13

## 2024-11-13 DIAGNOSIS — Z79.01 LONG TERM (CURRENT) USE OF ANTICOAGULANTS: ICD-10-CM

## 2024-11-13 DIAGNOSIS — I48.11 LONGSTANDING PERSISTENT ATRIAL FIBRILLATION (HCC): Primary | ICD-10-CM

## 2024-11-13 NOTE — TELEPHONE ENCOUNTER
Zee is bed-bound and unable to come in for a hospital follow-up.     She stated that she is due for another INR and the nurse is scheduled to come to her home on Thursday but will need an order.

## 2024-11-26 LAB
INR PPP: 2.4
PROTHROMBIN TIME: 25.8 SEC (ref 12–14.6)

## 2024-11-26 RX ORDER — NEOMYCIN/BACITRACIN/POLYMYXINB 3.5-400-5K
OINTMENT (GRAM) TOPICAL DAILY
COMMUNITY

## 2024-11-26 NOTE — PRE-PROCEDURE INSTRUCTIONS
Pre-Surgery Instructions:   Medication Instructions    acetaminophen (TYLENOL) 650 mg CR tablet Uses PRN- OK to take day of surgery    allopurinol (ZYLOPRIM) 100 mg tablet Take day of surgery.    furosemide (LASIX) 40 mg tablet Hold day of surgery.    gabapentin (NEURONTIN) 100 mg capsule Take day of surgery.    levothyroxine 125 mcg tablet Take day of surgery.    midodrine (PROAMATINE) 2.5 mg tablet Take day of surgery.    neomycin-bacitracin-polymyxin (NEOSPORIN) 5-400-5,000 ointment Hold day of surgery.    triamcinolone (KENALOG) 0.5 % cream Hold day of surgery.    warfarin (Coumadin) 5 mg tablet Take night before surgery   Medication instructions for day surgery reviewed. Please use only a sip of water to take your instructed medications. Avoid all over the counter vitamins, supplements and NSAIDS for one week prior to surgery per anesthesia guidelines. Tylenol is ok to take as needed.     You will receive a call one business day prior to surgery with an arrival time and hospital directions. If your surgery is scheduled on a Monday, the hospital will be calling you on the Friday prior to your surgery. If you have not heard from anyone by 8pm, please call the hospital supervisor through the hospital  at [phone]. ([place] [phone] or [place] [phone]).    Do not eat or drink anything after midnight the night before your surgery, including candy, mints, lifesavers, or chewing gum. Do not drink alcohol 24hrs before your surgery. Try not to smoke at least 24hrs before your surgery.       Follow the pre surgery showering instructions as listed in the “My Surgical Experience Booklet” or otherwise provided by your surgeon's office. Do not use a blade to shave the surgical area 1 week before surgery. It is okay to use a clean electric clippers up to 24 hours before surgery. Do not apply any lotions, creams, including makeup, cologne, deodorant, or perfumes after showering on the day of your  surgery. Do not use dry shampoo, hair spray, hair gel, or any type of hair products.     No contact lenses, eye make-up, or artificial eyelashes. Remove nail polish, including gel polish, and any artificial, gel, or acrylic nails if possible. Remove all jewelry including rings and body piercing jewelry.     Wear causal clothing that is easy to take on and off. Consider your type of surgery.    Keep any valuables, jewelry, piercings at home. Please bring any specially ordered equipment (sling, braces) if indicated.    Arrange for a responsible person to drive you to and from the hospital on the day of your surgery. Please confirm the visitor policy for the day of your procedure when you receive your phone call with an arrival time.     Call the surgeon's office with any new illnesses, exposures, or additional questions prior to surgery.    Please reference your “My Surgical Experience Booklet” for additional information to prepare for your upcoming surgery.     Pt is alert, came to the ER with parents due to superficial laceration of the right wrist as a result of him trying to hit a window glass. No bleeding, cleansed with NS and Bacitracin applied.

## 2024-11-27 ENCOUNTER — ANTICOAG VISIT (OUTPATIENT)
Dept: CARDIOLOGY CLINIC | Facility: CLINIC | Age: 74
End: 2024-11-27
Payer: MEDICARE

## 2024-11-27 DIAGNOSIS — Z79.01 LONG TERM (CURRENT) USE OF ANTICOAGULANTS: ICD-10-CM

## 2024-11-27 DIAGNOSIS — I48.11 LONGSTANDING PERSISTENT ATRIAL FIBRILLATION (HCC): Primary | ICD-10-CM

## 2024-11-27 PROCEDURE — 93793 ANTICOAG MGMT PT WARFARIN: CPT | Performed by: NURSE PRACTITIONER

## 2024-11-27 NOTE — PATIENT INSTRUCTIONS
INR received from lab and reviewed.    No changes needed; continue current dosing schedule.  Recheck INR in 3 weeks-instructions given to pt by phone  ORACIO Ty

## 2024-12-05 ENCOUNTER — HOSPITAL ENCOUNTER (OUTPATIENT)
Facility: HOSPITAL | Age: 74
Setting detail: OUTPATIENT SURGERY
Discharge: HOME | End: 2024-12-05
Attending: ANESTHESIOLOGY | Admitting: ANESTHESIOLOGY
Payer: MEDICARE

## 2024-12-05 ENCOUNTER — ANESTHESIA (OUTPATIENT)
Dept: PERIOP | Facility: HOSPITAL | Age: 74
End: 2024-12-05
Payer: MEDICARE

## 2024-12-05 ENCOUNTER — ANESTHESIA EVENT (OUTPATIENT)
Dept: PERIOP | Facility: HOSPITAL | Age: 74
End: 2024-12-05
Payer: MEDICARE

## 2024-12-05 VITALS
OXYGEN SATURATION: 92 % | HEART RATE: 78 BPM | HEIGHT: 64 IN | SYSTOLIC BLOOD PRESSURE: 118 MMHG | WEIGHT: 293 LBS | DIASTOLIC BLOOD PRESSURE: 56 MMHG | BODY MASS INDEX: 50.02 KG/M2 | RESPIRATION RATE: 92 BRPM | TEMPERATURE: 98 F

## 2024-12-05 PROBLEM — H25.11 CATARACT, NUCLEAR SCLEROTIC SENILE, RIGHT: Status: RESOLVED | Noted: 2024-12-05 | Resolved: 2024-12-05

## 2024-12-05 PROBLEM — H25.11 CATARACT, NUCLEAR SCLEROTIC SENILE, RIGHT: Status: ACTIVE | Noted: 2024-12-05

## 2024-12-05 PROCEDURE — V2632 POST CHMBR INTRAOCULAR LENS: HCPCS | Performed by: ANESTHESIOLOGY

## 2024-12-05 DEVICE — SOFPORT SILICONE ADVANCED OPTICS ASPHERIC LENS +20.00D
Type: IMPLANTABLE DEVICE | Site: POSTERIOR CHAMBER | Status: FUNCTIONAL
Brand: SOFPORT® IOL

## 2024-12-05 RX ORDER — TOBRAMYCIN 3 MG/ML
SOLUTION/ DROPS OPHTHALMIC AS NEEDED
Status: DISCONTINUED | OUTPATIENT
Start: 2024-12-05 | End: 2024-12-05 | Stop reason: HOSPADM

## 2024-12-05 RX ORDER — TROPICAMIDE 10 MG/ML
1 SOLUTION/ DROPS OPHTHALMIC
Status: COMPLETED | OUTPATIENT
Start: 2024-12-05 | End: 2024-12-05

## 2024-12-05 RX ORDER — LIDOCAINE HYDROCHLORIDE 10 MG/ML
INJECTION, SOLUTION EPIDURAL; INFILTRATION; INTRACAUDAL; PERINEURAL AS NEEDED
Status: DISCONTINUED | OUTPATIENT
Start: 2024-12-05 | End: 2024-12-05 | Stop reason: HOSPADM

## 2024-12-05 RX ORDER — BALANCED SALT SOLUTION 6.4; .75; .48; .3; 3.9; 1.7 MG/ML; MG/ML; MG/ML; MG/ML; MG/ML; MG/ML
SOLUTION OPHTHALMIC AS NEEDED
Status: DISCONTINUED | OUTPATIENT
Start: 2024-12-05 | End: 2024-12-05 | Stop reason: HOSPADM

## 2024-12-05 RX ORDER — TETRACAINE HYDROCHLORIDE 5 MG/ML
SOLUTION OPHTHALMIC AS NEEDED
Status: DISCONTINUED | OUTPATIENT
Start: 2024-12-05 | End: 2024-12-05 | Stop reason: HOSPADM

## 2024-12-05 RX ORDER — PHENYLEPHRINE HYDROCHLORIDE 25 MG/ML
1 SOLUTION/ DROPS OPHTHALMIC
Status: COMPLETED | OUTPATIENT
Start: 2024-12-05 | End: 2024-12-05

## 2024-12-05 RX ORDER — FENTANYL CITRATE 50 UG/ML
INJECTION, SOLUTION INTRAMUSCULAR; INTRAVENOUS AS NEEDED
Status: DISCONTINUED | OUTPATIENT
Start: 2024-12-05 | End: 2024-12-05

## 2024-12-05 RX ORDER — SODIUM CHLORIDE, SODIUM LACTATE, POTASSIUM CHLORIDE, CALCIUM CHLORIDE 600; 310; 30; 20 MG/100ML; MG/100ML; MG/100ML; MG/100ML
INJECTION, SOLUTION INTRAVENOUS CONTINUOUS PRN
Status: DISCONTINUED | OUTPATIENT
Start: 2024-12-05 | End: 2024-12-05

## 2024-12-05 RX ORDER — SODIUM CHLORIDE, SODIUM LACTATE, POTASSIUM CHLORIDE, CALCIUM CHLORIDE 600; 310; 30; 20 MG/100ML; MG/100ML; MG/100ML; MG/100ML
125 INJECTION, SOLUTION INTRAVENOUS CONTINUOUS
Status: DISCONTINUED | OUTPATIENT
Start: 2024-12-05 | End: 2024-12-05 | Stop reason: HOSPADM

## 2024-12-05 RX ORDER — MOXIFLOXACIN 5 MG/ML
SOLUTION/ DROPS OPHTHALMIC AS NEEDED
Status: DISCONTINUED | OUTPATIENT
Start: 2024-12-05 | End: 2024-12-05 | Stop reason: HOSPADM

## 2024-12-05 RX ORDER — MIDAZOLAM HYDROCHLORIDE 2 MG/2ML
INJECTION, SOLUTION INTRAMUSCULAR; INTRAVENOUS AS NEEDED
Status: DISCONTINUED | OUTPATIENT
Start: 2024-12-05 | End: 2024-12-05

## 2024-12-05 RX ORDER — POVIDONE-IODINE 5 %
SOLUTION, NON-ORAL OPHTHALMIC (EYE) AS NEEDED
Status: DISCONTINUED | OUTPATIENT
Start: 2024-12-05 | End: 2024-12-05 | Stop reason: HOSPADM

## 2024-12-05 RX ORDER — VANCOMYCIN HYDROCHLORIDE 500 MG/10ML
500 INJECTION, POWDER, LYOPHILIZED, FOR SOLUTION INTRAVENOUS ONCE
Status: COMPLETED | OUTPATIENT
Start: 2024-12-05 | End: 2024-12-05

## 2024-12-05 RX ORDER — TOBRAMYCIN 3 MG/ML
1 SOLUTION/ DROPS OPHTHALMIC ONCE
Status: COMPLETED | OUTPATIENT
Start: 2024-12-05 | End: 2024-12-05

## 2024-12-05 RX ADMIN — PHENYLEPHRINE HYDROCHLORIDE 1 DROP: 25 SOLUTION/ DROPS OPHTHALMIC at 11:25

## 2024-12-05 RX ADMIN — FENTANYL CITRATE 25 MCG: 50 INJECTION INTRAMUSCULAR; INTRAVENOUS at 11:41

## 2024-12-05 RX ADMIN — TROPICAMIDE 1 DROP: 10 SOLUTION/ DROPS OPHTHALMIC at 11:16

## 2024-12-05 RX ADMIN — PHENYLEPHRINE HYDROCHLORIDE 1 DROP: 25 SOLUTION/ DROPS OPHTHALMIC at 11:16

## 2024-12-05 RX ADMIN — SODIUM CHLORIDE, SODIUM LACTATE, POTASSIUM CHLORIDE, AND CALCIUM CHLORIDE 125 ML/HR: .6; .31; .03; .02 INJECTION, SOLUTION INTRAVENOUS at 11:35

## 2024-12-05 RX ADMIN — FENTANYL CITRATE 25 MCG: 50 INJECTION INTRAMUSCULAR; INTRAVENOUS at 11:54

## 2024-12-05 RX ADMIN — MIDAZOLAM 1 MG: 1 INJECTION INTRAMUSCULAR; INTRAVENOUS at 11:47

## 2024-12-05 RX ADMIN — TOBRAMYCIN 1 DROP: 3 SOLUTION/ DROPS OPHTHALMIC at 11:23

## 2024-12-05 RX ADMIN — MIDAZOLAM 1 MG: 1 INJECTION INTRAMUSCULAR; INTRAVENOUS at 11:40

## 2024-12-05 RX ADMIN — FENTANYL CITRATE 25 MCG: 50 INJECTION INTRAMUSCULAR; INTRAVENOUS at 12:20

## 2024-12-05 RX ADMIN — PHENYLEPHRINE HYDROCHLORIDE 1 DROP: 25 SOLUTION/ DROPS OPHTHALMIC at 11:35

## 2024-12-05 RX ADMIN — SODIUM CHLORIDE, SODIUM LACTATE, POTASSIUM CHLORIDE, AND CALCIUM CHLORIDE: .6; .31; .03; .02 INJECTION, SOLUTION INTRAVENOUS at 11:34

## 2024-12-05 RX ADMIN — FENTANYL CITRATE 25 MCG: 50 INJECTION INTRAMUSCULAR; INTRAVENOUS at 12:25

## 2024-12-05 RX ADMIN — TROPICAMIDE 1 DROP: 10 SOLUTION/ DROPS OPHTHALMIC at 11:26

## 2024-12-05 RX ADMIN — TROPICAMIDE 1 DROP: 10 SOLUTION/ DROPS OPHTHALMIC at 11:35

## 2024-12-05 NOTE — ANESTHESIA POSTPROCEDURE EVALUATION
Post-Op Assessment Note    CV Status:  Stable  Pain Score: 0    Pain management: adequate       Mental Status:  Alert   Hydration Status:  Stable   PONV Controlled:  None   Airway Patency:  Patent     Post Op Vitals Reviewed: Yes    No anethesia notable event occurred.    Staff: CRNA       Last Filed PACU Vitals:  Vitals Value Taken Time   Temp 98.4    Pulse 85    /56    Resp 16    SpO2 93%        Modified Dionicio:  No data recorded

## 2024-12-05 NOTE — ANESTHESIA PREPROCEDURE EVALUATION
Procedure:  CATARACT EXTRACTION WITH IOL IMPLANTATION (Right: Eye)    Relevant Problems   CARDIO   (+) Acute on chronic diastolic congestive heart failure (HCC)   (+) Acute on chronic heart failure with preserved ejection fraction (HFpEF) (HCC)   (+) Hyperlipidemia   (+) Longstanding persistent atrial fibrillation (HCC)      ENDO   (+) Other specified hypothyroidism   (+) Type 2 diabetes mellitus without complication, without long-term current use of insulin (HCC)      GI/HEPATIC   (+) Gastroesophageal reflux disease without esophagitis      HEMATOLOGY   (+) Anemia      MUSCULOSKELETAL   (+) Gout   (+) Idiopathic chronic 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   (+) Osteoarthritis of glenohumeral joint   (+) Primary osteoarthritis involving multiple joints      NEURO/PSYCH   (+) Depression with anxiety   (+) Mild episode of recurrent major depressive disorder (HCC)        Physical Exam    Airway    Mallampati score: II         Dental   No notable dental hx     Cardiovascular      Pulmonary      Other Findings  post-pubertal.      Anesthesia Plan  ASA Score- 4     Anesthesia Type- IV sedation with anesthesia with ASA Monitors.         Additional Monitors:     Airway Plan:     Comment: I, Dr. Silver, the attending physician, have personally seen and evaluated the patient prior to anesthetic care.  I have reviewed the pre-anesthetic record, and other medical records if appropriate to the anesthetic care.  If a CRNA is involved in the case, I have reviewed the CRNA assessment, if present, and agree.  The patient is in a suitable condition to proceed with my formulated anesthetic plan.  .       Plan Factors-    Chart reviewed.                      Induction- intravenous.    Postoperative Plan-         Informed Consent- Anesthetic plan and risks discussed with patient.  I personally reviewed this patient with the CRNA. Discussed and agreed on the Anesthesia Plan with the CRNA..

## 2024-12-05 NOTE — OP NOTE
OPERATIVE REPORT  PATIENT NAME: Zee Kirk    :  1950  MRN: [id number]  Pt Location: [facility] OR ROOM 03    SURGERY DATE: 2024    Surgeons and Role:     * Chantel Llanos MD - Primary    Preop Diagnosis:  Age-related nuclear cataract, right eye [H25.11]    Post-Op Diagnosis Codes:     * Age-related nuclear cataract, right eye [H25.11]    Procedure(s):  Right - CATARACT EXTRACTION WITH IOL IMPLANTATION    Specimen(s):  * No specimens in log *    Estimated Blood Loss:   Minimal    Drains:  External Urinary Catheter (Active)   Number of days: 76       External Urinary Catheter (Active)   Number of days: 29       Anesthesia Type:   Choice    Operative Indications:  Age-related nuclear cataract, right eye [H25.11]      Operative Findings:  Cataract right eye      Complications:   None    Procedure and Technique:  After the risks and benefits were explained to the patient and proper informed consent was obtained, the patient was taken to the operating room and identified by the attending physician Chantel pollock MD.  The patient was prepped and draped in a standard sterile manner.  A lid speculum was placed into the right eye. The anterior chamber was entered with a phaco blade, and 2 side port incisions were made.  The anterior chamber was filled with Amvisc Plus.  A continuous tear capsulorrhexis was performed with a cystotome, followed by hydrodissection. The lens nucleus was then removed with phacoemulsification, and the remaining cortical material removed with irrigation aspiration. The capsular bag was filled with Amvisc Plus.  The wound was slightly enlarged.  A Bausch and Lomb model number LII 6 1 a 0 20.0 diopters was folded and placed into the capsular bag. The remaining Amvisc was removed with irrigation aspiration. The wound was found to be watertight. An intra-ocular injection of vancomycin was given and the lid speculum was removed. The patient left the operating room having tolerated the  procedure well.   I was present for the entire procedure.    Patient Disposition:  PACU              SIGNATURE: Chantel Llanos MD  DATE: December 5, 2024  TIME: 12:37 PM

## 2024-12-09 NOTE — ANESTHESIA POSTPROCEDURE EVALUATION
Post-Op Assessment Note    CV Status:  Stable    Pain management: adequate       Mental Status:  Alert   Hydration Status:  Stable   PONV Controlled:  None   Airway Patency:  Patent     Post Op Vitals Reviewed: Yes    No anethesia notable event occurred.    Staff: Anesthesiologist           Last Filed PACU Vitals:  Vitals Value Taken Time   Temp 98 °F (36.7 °C) 12/05/24 1241   Pulse 78 12/05/24 1241   /56 12/05/24 1241   Resp 92 12/05/24 1241   SpO2 92 % 12/05/24 1241       Modified Dionicio:  No data recorded

## 2024-12-11 ENCOUNTER — HOSPITAL ENCOUNTER (INPATIENT)
Facility: HOSPITAL | Age: 74
LOS: 5 days | Discharge: HOME | DRG: 291 | End: 2024-12-16
Attending: INTERNAL MEDICINE | Admitting: INTERNAL MEDICINE
Payer: MEDICARE

## 2024-12-11 ENCOUNTER — APPOINTMENT (EMERGENCY)
Dept: RADIOLOGY | Facility: HOSPITAL | Age: 74
DRG: 291 | End: 2024-12-11
Payer: MEDICARE

## 2024-12-11 DIAGNOSIS — J96.02 ACUTE RESPIRATORY FAILURE WITH HYPERCAPNIA (HCC): Primary | ICD-10-CM

## 2024-12-11 DIAGNOSIS — E66.01 MORBID OBESITY (HCC): ICD-10-CM

## 2024-12-11 DIAGNOSIS — J96.91 HYPOXIC RESPIRATORY FAILURE (HCC): ICD-10-CM

## 2024-12-11 DIAGNOSIS — L98.9 SKIN ABNORMALITIES: ICD-10-CM

## 2024-12-11 DIAGNOSIS — R21 RASH: ICD-10-CM

## 2024-12-11 PROBLEM — I89.0 LYMPHEDEMA: Status: RESOLVED | Noted: 2024-12-11 | Resolved: 2024-12-11

## 2024-12-11 PROBLEM — I89.0 LYMPHEDEMA: Status: ACTIVE | Noted: 2024-12-11

## 2024-12-11 PROBLEM — M15.0 PRIMARY OSTEOARTHRITIS INVOLVING MULTIPLE JOINTS: Status: RESOLVED | Noted: 2020-10-03 | Resolved: 2024-12-11

## 2024-12-11 PROBLEM — E03.9 HYPOTHYROID: Status: ACTIVE | Noted: 2020-10-03

## 2024-12-11 PROBLEM — A41.9 SEPSIS (HCC): Status: ACTIVE | Noted: 2024-12-11

## 2024-12-11 PROBLEM — Z87.442 HISTORY OF KIDNEY STONES: Status: ACTIVE | Noted: 2024-12-11

## 2024-12-11 PROBLEM — I48.0 PAROXYSMAL A-FIB (HCC): Status: ACTIVE | Noted: 2024-12-11

## 2024-12-11 LAB
2HR DELTA HS TROPONIN: 1 NG/L
4HR DELTA HS TROPONIN: 1 NG/L
ALBUMIN SERPL BCG-MCNC: 2.7 G/DL (ref 3.5–5)
ALBUMIN SERPL BCG-MCNC: 2.7 G/DL (ref 3.5–5)
ALP SERPL-CCNC: 86 U/L (ref 34–104)
ALP SERPL-CCNC: 87 U/L (ref 34–104)
ALT SERPL W P-5'-P-CCNC: 4 U/L (ref 7–52)
ALT SERPL W P-5'-P-CCNC: 5 U/L (ref 7–52)
ANION GAP SERPL CALCULATED.3IONS-SCNC: 5 MMOL/L (ref 4–13)
ANION GAP SERPL CALCULATED.3IONS-SCNC: 7 MMOL/L (ref 4–13)
AST SERPL W P-5'-P-CCNC: 12 U/L (ref 13–39)
AST SERPL W P-5'-P-CCNC: 13 U/L (ref 13–39)
BASE EX.OXY STD BLDV CALC-SCNC: 66.5 % (ref 60–80)
BASE EX.OXY STD BLDV CALC-SCNC: 80.4 % (ref 60–80)
BASE EXCESS BLDV CALC-SCNC: 2.3 MMOL/L
BASE EXCESS BLDV CALC-SCNC: 6.6 MMOL/L
BASOPHILS # BLD AUTO: 0.04 THOUSANDS/ÂΜL (ref 0–0.1)
BASOPHILS # BLD AUTO: 0.06 THOUSANDS/ÂΜL (ref 0–0.1)
BASOPHILS NFR BLD AUTO: 0 % (ref 0–1)
BASOPHILS NFR BLD AUTO: 0 % (ref 0–1)
BILIRUB SERPL-MCNC: 0.42 MG/DL (ref 0.2–1)
BILIRUB SERPL-MCNC: 0.5 MG/DL (ref 0.2–1)
BNP SERPL-MCNC: 242 PG/ML (ref 0–100)
BUN SERPL-MCNC: 33 MG/DL (ref 5–25)
BUN SERPL-MCNC: 33 MG/DL (ref 5–25)
CALCIUM ALBUM COR SERPL-MCNC: 9.4 MG/DL (ref 8.3–10.1)
CALCIUM ALBUM COR SERPL-MCNC: 9.5 MG/DL (ref 8.3–10.1)
CALCIUM SERPL-MCNC: 8.4 MG/DL (ref 8.4–10.2)
CALCIUM SERPL-MCNC: 8.5 MG/DL (ref 8.4–10.2)
CARDIAC TROPONIN I PNL SERPL HS: 6 NG/L (ref ?–50)
CARDIAC TROPONIN I PNL SERPL HS: 7 NG/L (ref ?–50)
CARDIAC TROPONIN I PNL SERPL HS: 7 NG/L (ref ?–50)
CHLORIDE SERPL-SCNC: 100 MMOL/L (ref 96–108)
CHLORIDE SERPL-SCNC: 102 MMOL/L (ref 96–108)
CO2 SERPL-SCNC: 29 MMOL/L (ref 21–32)
CO2 SERPL-SCNC: 33 MMOL/L (ref 21–32)
CREAT SERPL-MCNC: 1.01 MG/DL (ref 0.6–1.3)
CREAT SERPL-MCNC: 1.08 MG/DL (ref 0.6–1.3)
EOSINOPHIL # BLD AUTO: 0.36 THOUSAND/ÂΜL (ref 0–0.61)
EOSINOPHIL # BLD AUTO: 0.39 THOUSAND/ÂΜL (ref 0–0.61)
EOSINOPHIL NFR BLD AUTO: 3 % (ref 0–6)
EOSINOPHIL NFR BLD AUTO: 3 % (ref 0–6)
ERYTHROCYTE [DISTWIDTH] IN BLOOD BY AUTOMATED COUNT: 17.5 % (ref 11.6–15.1)
ERYTHROCYTE [DISTWIDTH] IN BLOOD BY AUTOMATED COUNT: 17.5 % (ref 11.6–15.1)
GFR SERPL CREATININE-BSD FRML MDRD: 51 ML/MIN/1.73SQ M
GFR SERPL CREATININE-BSD FRML MDRD: 55 ML/MIN/1.73SQ M
GLUCOSE SERPL-MCNC: 118 MG/DL (ref 65–140)
GLUCOSE SERPL-MCNC: 156 MG/DL (ref 65–140)
HCO3 BLDV-SCNC: 30.6 MMOL/L (ref 24–30)
HCO3 BLDV-SCNC: 34.8 MMOL/L (ref 24–30)
HCT VFR BLD AUTO: 34.4 % (ref 34.8–46.1)
HCT VFR BLD AUTO: 35.4 % (ref 34.8–46.1)
HGB BLD-MCNC: 9.3 G/DL (ref 11.5–15.4)
HGB BLD-MCNC: 9.7 G/DL (ref 11.5–15.4)
IMM GRANULOCYTES # BLD AUTO: 0.06 THOUSAND/UL (ref 0–0.2)
IMM GRANULOCYTES # BLD AUTO: 0.1 THOUSAND/UL (ref 0–0.2)
IMM GRANULOCYTES NFR BLD AUTO: 1 % (ref 0–2)
IMM GRANULOCYTES NFR BLD AUTO: 1 % (ref 0–2)
LYMPHOCYTES # BLD AUTO: 0.98 THOUSANDS/ÂΜL (ref 0.6–4.47)
LYMPHOCYTES # BLD AUTO: 1.12 THOUSANDS/ÂΜL (ref 0.6–4.47)
LYMPHOCYTES NFR BLD AUTO: 7 % (ref 14–44)
LYMPHOCYTES NFR BLD AUTO: 9 % (ref 14–44)
MAGNESIUM SERPL-MCNC: 2 MG/DL (ref 1.9–2.7)
MAGNESIUM SERPL-MCNC: 2.1 MG/DL (ref 1.9–2.7)
MCH RBC QN AUTO: 24.5 PG (ref 26.8–34.3)
MCH RBC QN AUTO: 24.7 PG (ref 26.8–34.3)
MCHC RBC AUTO-ENTMCNC: 27 G/DL (ref 31.4–37.4)
MCHC RBC AUTO-ENTMCNC: 27.4 G/DL (ref 31.4–37.4)
MCV RBC AUTO: 90 FL (ref 82–98)
MCV RBC AUTO: 91 FL (ref 82–98)
MONOCYTES # BLD AUTO: 0.53 THOUSAND/ÂΜL (ref 0.17–1.22)
MONOCYTES # BLD AUTO: 0.57 THOUSAND/ÂΜL (ref 0.17–1.22)
MONOCYTES NFR BLD AUTO: 4 % (ref 4–12)
MONOCYTES NFR BLD AUTO: 5 % (ref 4–12)
NEUTROPHILS # BLD AUTO: 10.49 THOUSANDS/ÂΜL (ref 1.85–7.62)
NEUTROPHILS # BLD AUTO: 11.63 THOUSANDS/ÂΜL (ref 1.85–7.62)
NEUTS SEG NFR BLD AUTO: 82 % (ref 43–75)
NEUTS SEG NFR BLD AUTO: 85 % (ref 43–75)
NRBC BLD AUTO-RTO: 0 /100 WBCS
NRBC BLD AUTO-RTO: 0 /100 WBCS
O2 CT BLDV-SCNC: 10.6 ML/DL
O2 CT BLDV-SCNC: 11.9 ML/DL
PCO2 BLDV: 69.1 MM HG (ref 42–50)
PCO2 BLDV: 70.7 MM HG (ref 42–50)
PH BLDV: 7.26 [PH] (ref 7.3–7.4)
PH BLDV: 7.31 [PH] (ref 7.3–7.4)
PHOSPHATE SERPL-MCNC: 3.5 MG/DL (ref 2.3–4.1)
PLATELET # BLD AUTO: 418 THOUSANDS/UL (ref 149–390)
PLATELET # BLD AUTO: 485 THOUSANDS/UL (ref 149–390)
PMV BLD AUTO: 8.4 FL (ref 8.9–12.7)
PMV BLD AUTO: 8.6 FL (ref 8.9–12.7)
PO2 BLDV: 35.2 MM HG (ref 35–45)
PO2 BLDV: 49.8 MM HG (ref 35–45)
POTASSIUM SERPL-SCNC: 4 MMOL/L (ref 3.5–5.3)
POTASSIUM SERPL-SCNC: 4 MMOL/L (ref 3.5–5.3)
PROT SERPL-MCNC: 7.3 G/DL (ref 6.4–8.4)
PROT SERPL-MCNC: 7.5 G/DL (ref 6.4–8.4)
RBC # BLD AUTO: 3.79 MILLION/UL (ref 3.81–5.12)
RBC # BLD AUTO: 3.93 MILLION/UL (ref 3.81–5.12)
SODIUM SERPL-SCNC: 138 MMOL/L (ref 135–147)
SODIUM SERPL-SCNC: 138 MMOL/L (ref 135–147)
WBC # BLD AUTO: 12.64 THOUSAND/UL (ref 4.31–10.16)
WBC # BLD AUTO: 13.69 THOUSAND/UL (ref 4.31–10.16)

## 2024-12-11 PROCEDURE — 93005 ELECTROCARDIOGRAM TRACING: CPT

## 2024-12-11 PROCEDURE — 94760 N-INVAS EAR/PLS OXIMETRY 1: CPT

## 2024-12-11 PROCEDURE — 80053 COMPREHEN METABOLIC PANEL: CPT | Performed by: INTERNAL MEDICINE

## 2024-12-11 PROCEDURE — 84145 PROCALCITONIN (PCT): CPT

## 2024-12-11 PROCEDURE — 94002 VENT MGMT INPAT INIT DAY: CPT

## 2024-12-11 PROCEDURE — 85025 COMPLETE CBC W/AUTO DIFF WBC: CPT

## 2024-12-11 PROCEDURE — 96374 THER/PROPH/DIAG INJ IV PUSH: CPT

## 2024-12-11 PROCEDURE — 83880 ASSAY OF NATRIURETIC PEPTIDE: CPT | Performed by: INTERNAL MEDICINE

## 2024-12-11 PROCEDURE — 84100 ASSAY OF PHOSPHORUS: CPT

## 2024-12-11 PROCEDURE — 99223 1ST HOSP IP/OBS HIGH 75: CPT

## 2024-12-11 PROCEDURE — 99285 EMERGENCY DEPT VISIT HI MDM: CPT

## 2024-12-11 PROCEDURE — 99292 CRITICAL CARE ADDL 30 MIN: CPT | Performed by: INTERNAL MEDICINE

## 2024-12-11 PROCEDURE — 99291 CRITICAL CARE FIRST HOUR: CPT | Performed by: INTERNAL MEDICINE

## 2024-12-11 PROCEDURE — 71045 X-RAY EXAM CHEST 1 VIEW: CPT

## 2024-12-11 PROCEDURE — 85025 COMPLETE CBC W/AUTO DIFF WBC: CPT | Performed by: INTERNAL MEDICINE

## 2024-12-11 PROCEDURE — 83735 ASSAY OF MAGNESIUM: CPT

## 2024-12-11 PROCEDURE — 36415 COLL VENOUS BLD VENIPUNCTURE: CPT | Performed by: INTERNAL MEDICINE

## 2024-12-11 PROCEDURE — 80053 COMPREHEN METABOLIC PANEL: CPT

## 2024-12-11 PROCEDURE — 87040 BLOOD CULTURE FOR BACTERIA: CPT

## 2024-12-11 PROCEDURE — 82805 BLOOD GASES W/O2 SATURATION: CPT | Performed by: INTERNAL MEDICINE

## 2024-12-11 PROCEDURE — 84484 ASSAY OF TROPONIN QUANT: CPT | Performed by: INTERNAL MEDICINE

## 2024-12-11 PROCEDURE — 83735 ASSAY OF MAGNESIUM: CPT | Performed by: INTERNAL MEDICINE

## 2024-12-11 RX ORDER — FUROSEMIDE 10 MG/ML
80 INJECTION INTRAMUSCULAR; INTRAVENOUS ONCE
Status: COMPLETED | OUTPATIENT
Start: 2024-12-11 | End: 2024-12-11

## 2024-12-11 RX ORDER — ENOXAPARIN SODIUM 100 MG/ML
40 INJECTION SUBCUTANEOUS DAILY
Status: DISCONTINUED | OUTPATIENT
Start: 2024-12-12 | End: 2024-12-11

## 2024-12-11 RX ORDER — CHLORHEXIDINE GLUCONATE ORAL RINSE 1.2 MG/ML
15 SOLUTION DENTAL EVERY 12 HOURS SCHEDULED
Status: DISCONTINUED | OUTPATIENT
Start: 2024-12-11 | End: 2024-12-12

## 2024-12-11 RX ADMIN — FUROSEMIDE 80 MG: 10 INJECTION, SOLUTION INTRAMUSCULAR; INTRAVENOUS at 20:31

## 2024-12-11 NOTE — ED PROVIDER NOTES
Time reflects when diagnosis was documented in both MDM as applicable and the Disposition within this note       Time User Action Codes Description Comment    12/11/2024 10:06 PM Ryan Dixon [J96.91] Hypoxic respiratory failure (HCC)     12/11/2024 10:06 PM Ryan Dixon [E66.01] Morbid obesity (HCC)     12/11/2024 10:06 PM Ryan Dixon [J96.02] Acute respiratory failure with hypercapnia (HCC)           ED Disposition       ED Disposition   Admit    Condition   Stable    Date/Time   Wed Dec 11, 2024 10:07 PM    Comment   Case was discussed with Quan Vanegas  and the patient's admission status was agreed to be Admission Status: inpatient status to the service of Dr. Vanegas .               Assessment & Plan       Medical Decision Making  Patient with 3 days of increasing shortness of breath.  She does not have oxygen at home.  She does not have CPAP or BiPAP at home.  She is morbidly obese.  Likely has sleep apnea.  Currently has acute on chronic congestive heart failure.  Had recent cataract surgery.  Note her weight is up almost 40 pounds from her last discharge weight.  Room air pulse oximetry was 86%, and when she falls asleep she drops into the 50s percentage pulse ox..     Past medical history significant for panniculitis, chronic congestive heart failure, hypoxic respiratory failure, and diabetes mellitus.  And atrial fibrillation.  She is on chronic anticoagulation.    Amount and/or Complexity of Data Reviewed  Labs: ordered.     Details: Lab data fails reveal any significant spiking troponin.  BNP slightly elevated.  Hemoglobin ayleen stable in the mid 9 range.  Elevated white count.  VBG significant for  hypoxia and respiratory acidosis.  Radiology: ordered and independent interpretation performed.     Details: Chest x-ray poor quality but appears to be in vascular congestion.    Risk  Prescription drug management.  Decision regarding hospitalization.  Risk Details: Patient was  attempted to be removed off BiPAP, was on BiPAP for the better part of an hour and diuresis.  Despite this, oxygenation CO2 did not really budge very much.  Will need adjustment in the BiPAP.  Labs patient placed in stepdown level 1.  Discussed with critical care as well as abrahan.        ED Course as of 12/11/24 2215   Wed Dec 11, 2024   1913 Patient is quite somnolent.  Dips down to 50s when she is sleeping even on her oxygen.  BiPAP has been ordered.  Will discussed with critical care and slim.   2044 Discussed with critical care, giving Lasix and giving BiPAP chance to see how she does.  Her initial blood pressures are marginal at best and 100 systolic manually.  She remains chronically low blood pressure, as she is on Midrin known chronically at home.   2134 Patient remains arousable, and oxygenating well on BiPAP.  Will check another VBG, and attempt to remove the BiPAP.  So admission after that.   2203 Patient failed BiPAP, little change in vbg, discussed with critical care.  Will move to stepdown when bed, continue diuresis and monitoring of blood pressures.       Medications   furosemide (LASIX) injection 80 mg (80 mg Intravenous Given 12/11/24 2031)       ED Risk Strat Scores                          SBIRT 22yo+      Flowsheet Row Most Recent Value   Initial Alcohol Screen: US AUDIT-C     1. How often do you have a drink containing alcohol? 0 Filed at: 12/11/2024 1731   2. How many drinks containing alcohol do you have on a typical day you are drinking?  0 Filed at: 12/11/2024 1731   3b. FEMALE Any Age, or MALE 65+: How often do you have 4 or more drinks on one occassion? 0 Filed at: 12/11/2024 1731   Audit-C Score 0 Filed at: 12/11/2024 1731   JYOTI: How many times in the past year have you...    Used an illegal drug or used a prescription medication for non-medical reasons? Never Filed at: 12/11/2024 1731                            History of Present Illness       Chief Complaint   Patient presents with     Shortness of Breath     According to the patient, she feels SOB starting about 3 days ago.  Patient reports chills with episodes of emesis with nausea.       Past Medical History:   Diagnosis Date    Atrial fibrillation (HCC)     Bladder stone     C. difficile colitis     Cataract, nuclear sclerotic senile, right 2024    Cellulitis     CHF (congestive heart failure) (HCC)     Depression with anxiety     Disease of thyroid gland     Diverticulitis     Enterocolitis     History of abnormal cervical Pap smear     Kidney stone     Lymphedema     Menopause     Age 50    Morbid obesity with BMI of 70 and over, adult ([facility])     MRSA (methicillin resistant Staphylococcus aureus)     abdominal wound    Osteoarthritis     Phlebitis     left lower leg    Sleep apnea     Spondylolysis       Past Surgical History:   Procedure Laterality Date    APPENDECTOMY      CARPAL TUNNEL RELEASE      CATARACT EXTRACTION Right     CHOLECYSTECTOMY      CYSTOSCOPY      stent    EGD      FOOT SURGERY      bone spur    KNEE SURGERY      HI ICAPSULAR CATARACT XTRJ INSJ IO LENS PRSTH 1 STG Right 2024    Procedure: CATARACT EXTRACTION WITH IOL IMPLANTATION;  Surgeon: Chantel Llanos MD;  Location: [facility];  Service: Ophthalmology    WISDOM TOOTH EXTRACTION        Family History   Problem Relation Age of Onset    Heart failure Mother     Heart disease Mother     Lymphoma Mother     Seizures Mother     Kidney disease Father     Heart disease Father     Heart failure Father     Diabetes type II Father     Diabetes type II Sister     Diabetes type II Brother     Uterine cancer Paternal Aunt     Breast cancer Neg Hx     Colon cancer Neg Hx     Ovarian cancer Neg Hx       Social History     Tobacco Use    Smoking status: Former     Current packs/day: 0.00     Average packs/day: 5.0 packs/day for 3.0 years (15.0 ttl pk-yrs)     Types: Cigarettes     Start date: 1967     Quit date: 1970     Years since quittin.4    Smokeless  tobacco: Never    Tobacco comments:     5 packs/day until 1970 (age [age])    Vaping Use    Vaping status: Never Used   Substance Use Topics    Alcohol use: Not Currently    Drug use: Not Currently     Types: Marijuana     Comment: As a teen - As per [employer]       E-Cigarette/Vaping    E-Cigarette Use Never User       E-Cigarette/Vaping Substances    Nicotine No     THC No     CBD No     Flavoring No     Other No     Unknown No       I have reviewed and agree with the history as documented.     Patient with 3 days of increasing shortness of breath.  She does not have oxygen at home.  She does not have CPAP or BiPAP at home.  She is morbidly obese.  Likely has sleep apnea.  Currently has acute on chronic congestive heart failure.  Had recent cataract surgery.  Note her weight is up almost 40 pounds from her last discharge weight.  Room air pulse oximetry was 86%, and when she falls asleep she drops into the 50s percentage pulse ox..         Review of Systems   Constitutional:  Positive for chills. Negative for fever.   HENT:  Positive for congestion. Negative for ear pain, rhinorrhea and sore throat.    Eyes:  Negative for pain, redness and visual disturbance.   Respiratory:  Positive for apnea, cough and shortness of breath.    Cardiovascular:  Negative for chest pain and leg swelling.   Gastrointestinal:  Negative for abdominal pain, diarrhea, nausea and vomiting.   Genitourinary:  Negative for dysuria, flank pain, frequency and urgency.   Musculoskeletal:  Negative for back pain, myalgias and neck pain.   Skin:  Negative for rash.   Neurological:  Negative for dizziness, weakness, light-headedness and headaches.   Hematological: Negative.    Psychiatric/Behavioral:  Negative for agitation, confusion and suicidal ideas. The patient is nervous/anxious.    All other systems reviewed and are negative.          Objective       ED Triage Vitals   Temperature Pulse Blood Pressure Respirations SpO2 Patient Position -  Orthostatic VS   12/11/24 1728 12/11/24 1728 12/11/24 1728 12/11/24 1728 12/11/24 1728 12/11/24 1728   97.9 °F (36.6 °C) 74 129/58 18 92 % Lying      Temp Source Heart Rate Source BP Location FiO2 (%) Pain Score    12/11/24 1728 12/11/24 1800 12/11/24 1728 -- 12/11/24 1728    Oral Monitor Right arm  10 - Worst Possible Pain      Vitals      Date and Time Temp Pulse SpO2 Resp BP Pain Score FACES Pain Rating User   12/11/24 2130 -- 63 95 % 17 95/50 -- -- MD   12/11/24 2115 -- 58 97 % 19 96/55 -- -- MD   12/11/24 2100 -- 68 93 % 14 107/53 -- -- MD   12/11/24 2045 -- 65 92 % 21 108/54 -- -- MD   12/11/24 2030 -- 60 97 % 15 97/54 -- -- MD   12/11/24 2015 -- 60 96 % 14 96/49 -- -- MD   12/11/24 1954 -- 56 99 % 14 104/52 -- -- MD   12/11/24 1830 -- 69 86 % 15 -- -- -- [facility]   12/11/24 1815 -- 68 92 % 20 -- -- -- [facility]   12/11/24 1800 -- 61 85 % 17 129/58 -- -- [facility]   12/11/24 1728 -- 74 92 % 18 129/58 10 - Worst Possible Pain -- [facility]   12/11/24 1728 97.9 °F (36.6 °C) -- -- -- -- -- -- [facility]            Physical Exam  Vitals and nursing note reviewed.   Constitutional:       General: She is not in acute distress.     Appearance: She is obese.   HENT:      Head: Normocephalic and atraumatic.   Eyes:      Conjunctiva/sclera: Conjunctivae normal.   Cardiovascular:      Rate and Rhythm: Normal rate and regular rhythm.      Heart sounds: No murmur heard.  Pulmonary:      Effort: Pulmonary effort is normal. Tachypnea present. No respiratory distress.      Breath sounds: Examination of the right-lower field reveals decreased breath sounds. Examination of the left-lower field reveals decreased breath sounds. Decreased breath sounds present.      Comments: Mild tachypnea with respiration  Chest:      Chest wall: No mass or deformity.   Abdominal:      Palpations: Abdomen is soft.      Tenderness: There is no abdominal tenderness.      Comments: Bilateral chronic panniculitis   Musculoskeletal:         General: No swelling.      Cervical  back: Neck supple.      Right lower leg: Edema present.      Left lower leg: Edema present.   Skin:     General: Skin is warm and dry.      Capillary Refill: Capillary refill takes less than 2 seconds.   Neurological:      General: No focal deficit present.      Mental Status: She is alert.   Psychiatric:         Mood and Affect: Mood normal.         Results Reviewed       Procedure Component Value Units Date/Time    HS Troponin I 4hr [[id number]]  (Normal) Collected: 12/11/24 2144    Lab Status: Final result Specimen: Blood from Arm, Right Updated: 12/11/24 2215     hs TnI 4hr 7 ng/L      Delta 4hr hsTnI 1 ng/L     Blood gas, venous [[id number]]  (Abnormal) Collected: 12/11/24 2144    Lab Status: Final result Specimen: Blood from Arm, Right Updated: 12/11/24 2150     pH, Iain 7.264     pCO2, Iain 69.1 mm Hg      pO2, Iain 49.8 mm Hg      HCO3, Iain 30.6 mmol/L      Base Excess, Iain 2.3 mmol/L      O2 Content, Iain 11.9 ml/dL      O2 HGB, VENOUS 80.4 %     HS Troponin I 2hr [[id number]]  (Normal) Collected: 12/11/24 1956    Lab Status: Final result Specimen: Blood from Arm, Right Updated: 12/11/24 2021     hs TnI 2hr 7 ng/L      Delta 2hr hsTnI 1 ng/L     B-Type Natriuretic Peptide(BNP) [[id number]]  (Abnormal) Collected: 12/11/24 1752    Lab Status: Final result Specimen: Blood from Arm, Left Updated: 12/11/24 1836      pg/mL     Blood gas, venous [[id number]]  (Abnormal) Collected: 12/11/24 1809    Lab Status: Final result Specimen: Blood from Arm, Right Updated: 12/11/24 1825     pH, Iain 7.310     pCO2, Iain 70.7 mm Hg      pO2, Iain 35.2 mm Hg      HCO3, Iain 34.8 mmol/L      Base Excess, Iain 6.6 mmol/L      O2 Content, Iain 10.6 ml/dL      O2 HGB, VENOUS 66.5 %     Comprehensive metabolic panel [[id number]]  (Abnormal) Collected: 12/11/24 1752    Lab Status: Final result Specimen: Blood from Arm, Left Updated: 12/11/24 1823     Sodium 138 mmol/L      Potassium 4.0 mmol/L      Chloride 100 mmol/L      CO2 33 mmol/L       ANION GAP 5 mmol/L      BUN 33 mg/dL      Creatinine 1.08 mg/dL      Glucose 156 mg/dL      Calcium 8.5 mg/dL      Corrected Calcium 9.5 mg/dL      AST 13 U/L      ALT 5 U/L      Alkaline Phosphatase 87 U/L      Total Protein 7.5 g/dL      Albumin 2.7 g/dL      Total Bilirubin 0.50 mg/dL      eGFR 51 ml/min/1.73sq m     Narrative:      National Kid[employer] guidelines for Chronic Kidney Disease (CKD):     Stage 1 with normal or high GFR (GFR > 90 mL/min/1.73 square meters)    Stage 2 Mild CKD (GFR = 60-89 mL/min/1.73 square meters)    Stage 3A Moderate CKD (GFR = 45-59 mL/min/1.73 square meters)    Stage 3B Moderate CKD (GFR = 30-44 mL/min/1.73 square meters)    Stage 4 Severe CKD (GFR = 15-29 mL/min/1.73 square meters)    Stage 5 End Stage CKD (GFR <15 mL/min/1.73 square meters)  Note: GFR calculation is accurate only with a steady state creatinine    Magnesium [[id number]]  (Normal) Collected: 12/11/24 1752    Lab Status: Final result Specimen: Blood from Arm, Left Updated: 12/11/24 1823     Magnesium 2.1 mg/dL     HS Troponin 0hr (reflex protocol) [[id number]]  (Normal) Collected: 12/11/24 1752    Lab Status: Final result Specimen: Blood from Arm, Left Updated: 12/11/24 1822     hs TnI 0hr 6 ng/L     CBC and differential [[id number]]  (Abnormal) Collected: 12/11/24 1752    Lab Status: Final result Specimen: Blood from Arm, Left Updated: 12/11/24 1758     WBC 13.69 Thousand/uL      RBC 3.93 Million/uL      Hemoglobin 9.7 g/dL      Hematocrit 35.4 %      MCV 90 fL      MCH 24.7 pg      MCHC 27.4 g/dL      RDW 17.5 %      MPV 8.6 fL      Platelets 485 Thousands/uL      nRBC 0 /100 WBCs      Segmented % 85 %      Immature Grans % 1 %      Lymphocytes % 7 %      Monocytes % 4 %      Eosinophils Relative 3 %      Basophils Relative 0 %      Absolute Neutrophils 11.63 Thousands/µL      Absolute Immature Grans 0.10 Thousand/uL      Absolute Lymphocytes 0.98 Thousands/µL      Absolute Monocytes 0.53 Thousand/µL       Eosinophils Absolute 0.39 Thousand/µL      Basophils Absolute 0.06 Thousands/µL             XR chest 1 view portable   ED Interpretation by Ryan Dixon DO (12/11 1908)   Mild vascular congestion          ECG 12 Lead Documentation Only    Date/Time: 12/11/2024 5:40 AM    Performed by: Ryan Dixon DO  Authorized by: Ryan Dixon DO    Indications / Diagnosis:  SOB  ECG reviewed by me, the ED Provider: yes    Patient location:  ED  Previous ECG:     Previous ECG:  Compared to current    Similarity:  No change  Interpretation:     Interpretation: abnormal    Quality:     Tracing quality:  Limited by artifact  Rate:     ECG rate:  75    ECG rate assessment: normal    Rhythm:     Rhythm: atrial fibrillation    Ectopy:     Ectopy: none    QRS:     QRS axis:  Normal    QRS intervals:  Normal  Conduction:     Conduction: normal    ST segments:     ST segments:  Non-specific  T waves:     T waves: non-specific    Comments:      Low voltage  CriticalCare Time    Date/Time: 12/11/2024 10:12 PM    Performed by: Ryan Dixon DO  Authorized by: Ryan Dixon DO    Critical care provider statement:     Critical care time (minutes):  240    Critical care start time:  12/11/2024 6:14 PM    Critical care end time:  12/11/2024 10:14 PM    Critical care time was exclusive of:  Separately billable procedures and treating other patients    Critical care was necessary to treat or prevent imminent or life-threatening deterioration of the following conditions:  Respiratory failure    Critical care was time spent personally by me on the following activities:  Obtaining history from patient or surrogate, discussions with consultants, evaluation of patient's response to treatment, ordering and performing treatments and interventions, re-evaluation of patient's condition, ordering and review of radiographic studies, ordering and review of laboratory studies and review of old charts    I assumed direction of critical  care for this patient from another provider in my specialty: no    Comments:      BiPAP management      ED Medication and Procedure Management   Prior to Admission Medications   Prescriptions Last Dose Informant Patient Reported? Taking?   Alcohol Swabs 70 % PADS   No No   Sig: May substitute brand based on insurance coverage. Check glucose BID.   Blood Glucose Monitoring Suppl (OneTouch Verio Reflect) w/Device KIT   No No   Sig: May substitute brand based on insurance coverage. Check glucose BID.   DULoxetine (CYMBALTA) 20 mg capsule  Self No No   Sig: Take 1 capsule (20 mg total) by mouth daily   Patient not taking: Reported on 11/7/2024   OneTouch Delica Lancets 33G MISC   No No   Sig: May substitute brand based on insurance coverage. Check glucose BID.   acetaminophen (TYLENOL) 650 mg CR tablet  Self Yes No   Sig: Take 650 mg by mouth every 8 (eight) hours as needed (for osteoarthritis)   allopurinol (ZYLOPRIM) 100 mg tablet  Self No No   Sig: Take 1 tablet (100 mg total) by mouth daily   benzonatate (TESSALON PERLES) 100 mg capsule   No No   Sig: Take 1 capsule (100 mg total) by mouth every 8 (eight) hours   furosemide (LASIX) 40 mg tablet   No No   Sig: Take 1.5 tablets (60 mg total) by mouth daily   Patient taking differently: Take 60 mg by mouth 2 (two) times a day   gabapentin (NEURONTIN) 100 mg capsule   No No   Sig: Take 1 capsule (100 mg total) by mouth 3 (three) times a day   Patient taking differently: Take 200 mg by mouth 3 (three) times a day   glucose blood (OneTouch Verio) test strip   No No   Sig: May substitute brand based on insurance coverage. Check glucose BID.   levothyroxine 125 mcg tablet  Self Yes No   Sig: Take 137 mcg by mouth daily   midodrine (PROAMATINE) 2.5 mg tablet   No No   Sig: Take 1 tablet (2.5 mg total) by mouth 3 (three) times a day before meals   neomycin-bacitracin-polymyxin (NEOSPORIN) 5-400-5,000 ointment   Yes No   Sig: Apply topically in the morning Topically to wounds    triamcinolone (KENALOG) 0.5 % cream   Yes No   Sig: Apply 1 Application topically as needed for rash or irritation   warfarin (Coumadin) 5 mg tablet   No No   Sig: Take 1 tablet (5 mg total) by mouth daily   Patient taking differently: Take 5 mg by mouth daily 2 mg M,T,W,F SAT, SUN  4 mg TH,      Facility-Administered Medications: None     Patient's Medications   Discharge Prescriptions    No medications on file     No discharge procedures on file.  ED SEPSIS DOCUMENTATION   Time reflects when diagnosis was documented in both MDM as applicable and the Disposition within this note       Time User Action Codes Description Comment    12/11/2024 10:06 PM Ryan Dixon [J96.91] Hypoxic respiratory failure (HCC)     12/11/2024 10:06 PM Ryan Dixon [E66.01] Morbid obesity (HCC)     12/11/2024 10:06 PM Ryan Dixon [J96.02] Acute respiratory failure with hypercapnia (HCC)                  Ryan Dixon DO  12/11/24 0522

## 2024-12-11 NOTE — ED NOTES
Pt put on 6l of oxygen due to oxygen saturation decreasing to mid 50s. Provider notified      Octavia Taylor RN  12/11/24 4427

## 2024-12-11 NOTE — ED NOTES
Pt moved from ED bed to inpatient bed to prevent skin breakdown and increase pt comfort.      Octavia Taylor RN  12/11/24 5588

## 2024-12-12 DIAGNOSIS — G47.33 OSA (OBSTRUCTIVE SLEEP APNEA): Primary | ICD-10-CM

## 2024-12-12 DIAGNOSIS — E66.2 OBESITY HYPOVENTILATION SYNDROME (HCC): ICD-10-CM

## 2024-12-12 LAB
ANION GAP SERPL CALCULATED.3IONS-SCNC: 3 MMOL/L (ref 4–13)
APTT PPP: 43 SECONDS (ref 23–34)
ARTERIAL PATENCY WRIST A: NO
ARTERIAL PATENCY WRIST A: NO
ARTERIAL PATENCY WRIST A: YES
ATRIAL RATE: 78 BPM
BACTERIA UR QL AUTO: ABNORMAL /HPF
BASE EX.OXY STD BLDV CALC-SCNC: 42.9 % (ref 60–80)
BASE EX.OXY STD BLDV CALC-SCNC: 59.9 % (ref 60–80)
BASE EX.OXY STD BLDV CALC-SCNC: 75.2 % (ref 60–80)
BASE EXCESS BLDA CALC-SCNC: 3.7 MMOL/L
BASE EXCESS BLDV CALC-SCNC: 5.6 MMOL/L
BASE EXCESS BLDV CALC-SCNC: 5.7 MMOL/L
BASE EXCESS BLDV CALC-SCNC: 6.6 MMOL/L
BASOPHILS # BLD AUTO: 0.05 THOUSANDS/ÂΜL (ref 0–0.1)
BASOPHILS NFR BLD AUTO: 0 % (ref 0–1)
BILIRUB UR QL STRIP: NEGATIVE
BODY TEMPERATURE: 97 DEGREES FEHRENHEIT
BODY TEMPERATURE: 97 DEGREES FEHRENHEIT
BUN SERPL-MCNC: 33 MG/DL (ref 5–25)
CA-I BLD-SCNC: 1.11 MMOL/L (ref 1.12–1.32)
CA-I BLD-SCNC: 1.11 MMOL/L (ref 1.12–1.32)
CALCIUM SERPL-MCNC: 8.2 MG/DL (ref 8.4–10.2)
CHLORIDE SERPL-SCNC: 102 MMOL/L (ref 96–108)
CLARITY UR: CLEAR
CO2 SERPL-SCNC: 34 MMOL/L (ref 21–32)
COLOR UR: YELLOW
CREAT SERPL-MCNC: 0.94 MG/DL (ref 0.6–1.3)
EOSINOPHIL # BLD AUTO: 0.42 THOUSAND/ÂΜL (ref 0–0.61)
EOSINOPHIL NFR BLD AUTO: 4 % (ref 0–6)
ERYTHROCYTE [DISTWIDTH] IN BLOOD BY AUTOMATED COUNT: 17.4 % (ref 11.6–15.1)
FLUAV RNA RESP QL NAA+PROBE: NEGATIVE
FLUBV RNA RESP QL NAA+PROBE: NEGATIVE
GFR SERPL CREATININE-BSD FRML MDRD: 60 ML/MIN/1.73SQ M
GLUCOSE SERPL-MCNC: 100 MG/DL (ref 65–140)
GLUCOSE SERPL-MCNC: 104 MG/DL (ref 65–140)
GLUCOSE SERPL-MCNC: 104 MG/DL (ref 65–140)
GLUCOSE SERPL-MCNC: 123 MG/DL (ref 65–140)
GLUCOSE SERPL-MCNC: 140 MG/DL (ref 65–140)
GLUCOSE UR STRIP-MCNC: NEGATIVE MG/DL
HCO3 BLDA-SCNC: 30 MMOL/L (ref 22–28)
HCO3 BLDV-SCNC: 34.2 MMOL/L (ref 24–30)
HCO3 BLDV-SCNC: 34.3 MMOL/L (ref 24–30)
HCO3 BLDV-SCNC: 34.7 MMOL/L (ref 24–30)
HCT VFR BLD AUTO: 33 % (ref 34.8–46.1)
HGB BLD-MCNC: 8.9 G/DL (ref 11.5–15.4)
HGB UR QL STRIP.AUTO: NEGATIVE
HYALINE CASTS #/AREA URNS LPF: ABNORMAL /LPF
IMM GRANULOCYTES # BLD AUTO: 0.05 THOUSAND/UL (ref 0–0.2)
IMM GRANULOCYTES NFR BLD AUTO: 0 % (ref 0–2)
INR PPP: 2.1 (ref 0.85–1.19)
IPAP: 18
IPAP: 20
KETONES UR STRIP-MCNC: NEGATIVE MG/DL
L PNEUMO1 AG UR QL IA.RAPID: NEGATIVE
LACTATE SERPL-SCNC: 1 MMOL/L (ref 0.5–2)
LEUKOCYTE ESTERASE UR QL STRIP: NEGATIVE
LYMPHOCYTES # BLD AUTO: 0.97 THOUSANDS/ÂΜL (ref 0.6–4.47)
LYMPHOCYTES NFR BLD AUTO: 8 % (ref 14–44)
MAGNESIUM SERPL-MCNC: 2 MG/DL (ref 1.9–2.7)
MCH RBC QN AUTO: 24.3 PG (ref 26.8–34.3)
MCHC RBC AUTO-ENTMCNC: 27 G/DL (ref 31.4–37.4)
MCV RBC AUTO: 90 FL (ref 82–98)
MONOCYTES # BLD AUTO: 0.47 THOUSAND/ÂΜL (ref 0.17–1.22)
MONOCYTES NFR BLD AUTO: 4 % (ref 4–12)
MUCOUS THREADS UR QL AUTO: ABNORMAL
NASAL CANNULA: 2
NEUTROPHILS # BLD AUTO: 9.99 THOUSANDS/ÂΜL (ref 1.85–7.62)
NEUTS SEG NFR BLD AUTO: 84 % (ref 43–75)
NITRITE UR QL STRIP: NEGATIVE
NON VENT- BIPAP: ABNORMAL
NON VENT- BIPAP: ABNORMAL
NON-SQ EPI CELLS URNS QL MICRO: ABNORMAL /HPF
NRBC BLD AUTO-RTO: 0 /100 WBCS
O2 CT BLDA-SCNC: 14.2 ML/DL (ref 16–23)
O2 CT BLDV-SCNC: 11.1 ML/DL
O2 CT BLDV-SCNC: 6.5 ML/DL
O2 CT BLDV-SCNC: 9.3 ML/DL
OXYHGB MFR BLDA: 94.5 % (ref 94–97)
PCO2 BLDA: 54.2 MM HG (ref 36–44)
PCO2 BLDV: 67.8 MM HG (ref 42–50)
PCO2 BLDV: 74.1 MM HG (ref 42–50)
PCO2 BLDV: 79.9 MM HG (ref 42–50)
PEEP MAX SETTING VENT: 10 CM[H2O]
PEEP MAX SETTING VENT: 8 CM[H2O]
PH BLDA: 7.36 [PH] (ref 7.35–7.45)
PH BLDV: 7.26 [PH] (ref 7.3–7.4)
PH BLDV: 7.28 [PH] (ref 7.3–7.4)
PH BLDV: 7.32 [PH] (ref 7.3–7.4)
PH UR STRIP.AUTO: 5.5 [PH]
PHOSPHATE SERPL-MCNC: 3.6 MG/DL (ref 2.3–4.1)
PLATELET # BLD AUTO: 415 THOUSANDS/UL (ref 149–390)
PMV BLD AUTO: 8.7 FL (ref 8.9–12.7)
PO2 BLDA: 76.4 MM HG (ref 75–129)
PO2 BLDV: 24.3 MM HG (ref 35–45)
PO2 BLDV: 32.1 MM HG (ref 35–45)
PO2 BLDV: 43.6 MM HG (ref 35–45)
POTASSIUM SERPL-SCNC: 4 MMOL/L (ref 3.5–5.3)
PROCALCITONIN SERPL-MCNC: 0.06 NG/ML
PROCALCITONIN SERPL-MCNC: 0.06 NG/ML
PROT UR STRIP-MCNC: NEGATIVE MG/DL
PROTHROMBIN TIME: 24 SECONDS (ref 12.3–15)
QRS AXIS: 24 DEGREES
QRSD INTERVAL: 66 MS
QT INTERVAL: 344 MS
QTC INTERVAL: 490 MS
RBC # BLD AUTO: 3.67 MILLION/UL (ref 3.81–5.12)
RBC #/AREA URNS AUTO: ABNORMAL /HPF
RSV RNA RESP QL NAA+PROBE: NEGATIVE
S PNEUM AG UR QL: NEGATIVE
SARS-COV-2 RNA RESP QL NAA+PROBE: NEGATIVE
SODIUM SERPL-SCNC: 139 MMOL/L (ref 135–147)
SP GR UR STRIP.AUTO: 1.02
SPECIMEN SOURCE: ABNORMAL
T WAVE AXIS: 153 DEGREES
UROBILINOGEN UR QL STRIP.AUTO: 0.2 E.U./DL
VENT BIPAP FIO2: 35 %
VENT BIPAP FIO2: 40 %
VENTRICULAR RATE: 122 BPM
WBC # BLD AUTO: 11.95 THOUSAND/UL (ref 4.31–10.16)
WBC #/AREA URNS AUTO: ABNORMAL /HPF

## 2024-12-12 PROCEDURE — 94760 N-INVAS EAR/PLS OXIMETRY 1: CPT

## 2024-12-12 PROCEDURE — 82805 BLOOD GASES W/O2 SATURATION: CPT | Performed by: INTERNAL MEDICINE

## 2024-12-12 PROCEDURE — 80048 BASIC METABOLIC PNL TOTAL CA: CPT

## 2024-12-12 PROCEDURE — 82948 REAGENT STRIP/BLOOD GLUCOSE: CPT

## 2024-12-12 PROCEDURE — 87040 BLOOD CULTURE FOR BACTERIA: CPT

## 2024-12-12 PROCEDURE — 0241U HB NFCT DS VIR RESP RNA 4 TRGT: CPT

## 2024-12-12 PROCEDURE — 93010 ELECTROCARDIOGRAM REPORT: CPT | Performed by: INTERNAL MEDICINE

## 2024-12-12 PROCEDURE — 82805 BLOOD GASES W/O2 SATURATION: CPT

## 2024-12-12 PROCEDURE — 85610 PROTHROMBIN TIME: CPT

## 2024-12-12 PROCEDURE — 85730 THROMBOPLASTIN TIME PARTIAL: CPT

## 2024-12-12 PROCEDURE — 36600 WITHDRAWAL OF ARTERIAL BLOOD: CPT

## 2024-12-12 PROCEDURE — NC001 PR NO CHARGE: Performed by: NURSE PRACTITIONER

## 2024-12-12 PROCEDURE — 82805 BLOOD GASES W/O2 SATURATION: CPT | Performed by: NURSE PRACTITIONER

## 2024-12-12 PROCEDURE — NC001 PR NO CHARGE: Performed by: STUDENT IN AN ORGANIZED HEALTH CARE EDUCATION/TRAINING PROGRAM

## 2024-12-12 PROCEDURE — 82330 ASSAY OF CALCIUM: CPT

## 2024-12-12 PROCEDURE — 83735 ASSAY OF MAGNESIUM: CPT

## 2024-12-12 PROCEDURE — 81001 URINALYSIS AUTO W/SCOPE: CPT

## 2024-12-12 PROCEDURE — 84145 PROCALCITONIN (PCT): CPT

## 2024-12-12 PROCEDURE — 83605 ASSAY OF LACTIC ACID: CPT

## 2024-12-12 PROCEDURE — 99233 SBSQ HOSP IP/OBS HIGH 50: CPT | Performed by: INTERNAL MEDICINE

## 2024-12-12 PROCEDURE — 94003 VENT MGMT INPAT SUBQ DAY: CPT

## 2024-12-12 PROCEDURE — 87449 NOS EACH ORGANISM AG IA: CPT

## 2024-12-12 PROCEDURE — NC001 PR NO CHARGE

## 2024-12-12 PROCEDURE — 87081 CULTURE SCREEN ONLY: CPT

## 2024-12-12 PROCEDURE — 85025 COMPLETE CBC W/AUTO DIFF WBC: CPT

## 2024-12-12 PROCEDURE — 84100 ASSAY OF PHOSPHORUS: CPT

## 2024-12-12 RX ORDER — PREDNISOLONE ACETATE 10 MG/ML
2 SUSPENSION/ DROPS OPHTHALMIC 2 TIMES DAILY
Status: ON HOLD | COMMUNITY
Start: 2024-11-25

## 2024-12-12 RX ORDER — LIDOCAINE 50 MG/G
2 PATCH TOPICAL DAILY
Status: DISCONTINUED | OUTPATIENT
Start: 2024-12-12 | End: 2024-12-16 | Stop reason: HOSPADM

## 2024-12-12 RX ORDER — FUROSEMIDE 10 MG/ML
80 INJECTION INTRAMUSCULAR; INTRAVENOUS ONCE
Status: DISCONTINUED | OUTPATIENT
Start: 2024-12-12 | End: 2024-12-12

## 2024-12-12 RX ORDER — ACETAMINOPHEN 325 MG/1
650 TABLET ORAL EVERY 6 HOURS PRN
Status: DISCONTINUED | OUTPATIENT
Start: 2024-12-12 | End: 2024-12-16 | Stop reason: HOSPADM

## 2024-12-12 RX ORDER — PREDNISOLONE ACETATE 10 MG/ML
1 SUSPENSION/ DROPS OPHTHALMIC 4 TIMES DAILY
Status: DISCONTINUED | OUTPATIENT
Start: 2024-12-12 | End: 2024-12-16 | Stop reason: HOSPADM

## 2024-12-12 RX ORDER — TOBRAMYCIN 3 MG/ML
1 SOLUTION/ DROPS OPHTHALMIC 4 TIMES DAILY
Status: ON HOLD | COMMUNITY
Start: 2024-11-25

## 2024-12-12 RX ORDER — TRIAMCINOLONE ACETONIDE 5 MG/G
CREAM TOPICAL 2 TIMES DAILY
Status: DISCONTINUED | OUTPATIENT
Start: 2024-12-12 | End: 2024-12-16 | Stop reason: HOSPADM

## 2024-12-12 RX ORDER — ALLOPURINOL 100 MG/1
100 TABLET ORAL DAILY
Status: DISCONTINUED | OUTPATIENT
Start: 2024-12-12 | End: 2024-12-16 | Stop reason: HOSPADM

## 2024-12-12 RX ORDER — WARFARIN SODIUM 2 MG/1
4 TABLET ORAL
Status: COMPLETED | OUTPATIENT
Start: 2024-12-12 | End: 2024-12-12

## 2024-12-12 RX ORDER — INSULIN LISPRO 100 [IU]/ML
4-20 INJECTION, SOLUTION INTRAVENOUS; SUBCUTANEOUS
Status: DISCONTINUED | OUTPATIENT
Start: 2024-12-12 | End: 2024-12-16

## 2024-12-12 RX ORDER — GABAPENTIN 100 MG/1
200 CAPSULE ORAL 3 TIMES DAILY
Status: DISCONTINUED | OUTPATIENT
Start: 2024-12-12 | End: 2024-12-16 | Stop reason: HOSPADM

## 2024-12-12 RX ORDER — MICONAZOLE NITRATE 20 MG/G
CREAM TOPICAL 2 TIMES DAILY
Status: DISCONTINUED | OUTPATIENT
Start: 2024-12-12 | End: 2024-12-16 | Stop reason: HOSPADM

## 2024-12-12 RX ORDER — MIDODRINE HYDROCHLORIDE 5 MG/1
2.5 TABLET ORAL
Status: DISCONTINUED | OUTPATIENT
Start: 2024-12-12 | End: 2024-12-16 | Stop reason: HOSPADM

## 2024-12-12 RX ORDER — TOBRAMYCIN 3 MG/ML
1 SOLUTION/ DROPS OPHTHALMIC 4 TIMES DAILY
Status: DISCONTINUED | OUTPATIENT
Start: 2024-12-12 | End: 2024-12-16 | Stop reason: HOSPADM

## 2024-12-12 RX ADMIN — TOBRAMYCIN 1 DROP: 3 SOLUTION/ DROPS OPHTHALMIC at 21:17

## 2024-12-12 RX ADMIN — CHLORHEXIDINE GLUCONATE 15 ML: 1.2 RINSE ORAL at 09:02

## 2024-12-12 RX ADMIN — MIDODRINE HYDROCHLORIDE 2.5 MG: 5 TABLET ORAL at 16:03

## 2024-12-12 RX ADMIN — WARFARIN SODIUM 4 MG: 2 TABLET ORAL at 17:09

## 2024-12-12 RX ADMIN — MIDODRINE HYDROCHLORIDE 2.5 MG: 5 TABLET ORAL at 11:58

## 2024-12-12 RX ADMIN — PREDNISOLONE ACETATE 1 DROP: 10 SUSPENSION/ DROPS OPHTHALMIC at 17:09

## 2024-12-12 RX ADMIN — FUROSEMIDE 60 MG: 40 TABLET ORAL at 11:58

## 2024-12-12 RX ADMIN — PREDNISOLONE ACETATE 1 DROP: 10 SUSPENSION/ DROPS OPHTHALMIC at 21:17

## 2024-12-12 RX ADMIN — GABAPENTIN 200 MG: 100 CAPSULE ORAL at 16:03

## 2024-12-12 RX ADMIN — TOBRAMYCIN 1 DROP: 3 SOLUTION/ DROPS OPHTHALMIC at 17:09

## 2024-12-12 RX ADMIN — LEVOTHYROXINE SODIUM 137 MCG: 25 TABLET ORAL at 11:58

## 2024-12-12 RX ADMIN — TRIAMCINOLONE ACETONIDE: 5 CREAM TOPICAL at 17:09

## 2024-12-12 RX ADMIN — GABAPENTIN 200 MG: 100 CAPSULE ORAL at 11:58

## 2024-12-12 RX ADMIN — FUROSEMIDE 60 MG: 20 TABLET ORAL at 16:03

## 2024-12-12 RX ADMIN — ACETAMINOPHEN 650 MG: 325 TABLET ORAL at 11:58

## 2024-12-12 RX ADMIN — TRIAMCINOLONE ACETONIDE: 5 CREAM TOPICAL at 08:59

## 2024-12-12 RX ADMIN — GABAPENTIN 200 MG: 100 CAPSULE ORAL at 21:16

## 2024-12-12 RX ADMIN — ALLOPURINOL 100 MG: 100 TABLET ORAL at 11:58

## 2024-12-12 NOTE — H&P
H&P - Critical Care/ICU   Name: Zee Kirk 74 y.o. female I MRN: [id number]  Unit/Bed#: ICU  I Date of Admission: 2024   Date of Service: 2024 I Hospital Day: 0       Assessment & Plan  Acute respiratory failure with hypoxia and hypercapnia (HCC)  Pt presented to the ED with worsening sob over the past 3 days. In the ED, pt was placed on 6L NC. When she would have periods of somnolence, her O2 sats would drop into the 50's. Pt was then placed on BiPAP. Of note, pt does not wear O2 at baseline or CPAP/BiPAP at home. On previous admission, pt reported a hx of DAVID, however she also stated she was not interested in a sleep study.  Lab work was significant for mild leukocytosis, elevated BNP. VB.310/70.7/35.2/34.8. Troponins were negative. Pt was given 80 mg IV Lasix for vascular congestion.  () CXR: noted for vascular congestion    Plan:  Flu/COVID/RSV pending  MRSA pending  Wean supplemental O2 to maintain SpO2> 92%  Will check an ABG  PRN diuresis  Acute on chronic diastolic congestive heart failure (HCC)  Wt Readings from Last 3 Encounters:   24 (!) 213 kg (469 lb)   24 (!) 193 kg (425 lb)   11/10/24 (!) 193 kg (424 lb 12.8 oz)     BNP on admission was 242  () CXR noted for pulmonary congestion  Pt was given 80 mg IV Lasix in the ED. Of note, pt does take 60 mg PO Lasix BID as an outpatient  (24) ECHO: EF 60%    Plan:  Lasix PRN  Daily weights  Strict I/O  Hold on IVF for now given volume overload        Hypothyroid  Plan:  Continue home Synthroid when able to tolerate PO intake  Class 3 severe obesity due to excess calories with serious comorbidity and body mass index (BMI) greater than or equal to 70 in adult ([facility])  Pt with 30lb weight gain since last documented weight on last hospital admission which was from 24-11/10/24    Plan:  Consider Nutrition consult  Encourage healthy lifestyle modifications  Depression with anxiety  Plan:  Continue home  Cymbalta when able to tolerate PO intake  Impaired mobility  Pt is reported to be bed bound    Plan:  PT/OT when appropriate  Frequent turn and repositioning   Cataract, nuclear sclerotic senile, right  Pt underwent R cataract extraction with implantation on 24  Paroxysmal A-fib (HCC)  Pt on Coumadin for anticoagulation  Currently rate controlled    Plan:  Continue home Coumadin  Will check INR level   Continuous cardiac monitoring  History of kidney stones  Plan:  Continue home Allopurinol when able to tolerate PO  Skin abnormalities  Pt with multiple weeping wounds  Pt uses Kenalog and Neosporin for wound care at home  According to documentation from most recent hospitalization, pt is well known to wound care team    Plan:  Will place wound consult  Frequent turning and repositioning  Lymphedema (Resolved: 2024)    Sepsis (HCC)  A/e/b tachypnea and leukocytosis  Possibly in setting of CHF exacerbation as source unclear     Plan:  Will send septic work-up now  LA pending  Procal pending  UA pending  Urine lytes pending  Flu/COVID/RSV pending  Blood cx pending  Will hold off on abx for now as pt does not appear infectious  Trend wbc and fever curve  Disposition: Stepdown Level 1    History of Present Illness   Zee Kirk is a 74 y.o. with a pmhx of a-fib on Coumadin, CHF, Depression and Anxiety, Hypothyroidism, morbid obesity, ambulatory dysfunction, who presented to the ED with worsening sob over the past 3 days. In the ED, pt was placed on 6L NC. When she would have periods of somnolence, her O2 sats would drop into the 50's. Pt was then placed on BiPAP. Of note, pt does not wear O2 at baseline or CPAP/BiPAP at home. On previous admission, pt reported a hx of DAVID, however she also stated she was not interested in a sleep study.  Lab work was significant for mild leukocytosis, elevated BNP. VB.310/70.7/35.2/34.8. Troponins were negative. Pt was given 80 mg IV Lasix for vascular congestion  noted on CXR. Pt was admitted to the ICU as a SD1 level of care for further management and care.     History obtained from chart review.  Review of Systems: Review of Systems   Constitutional:  Positive for fatigue.   Respiratory:  Negative for chest tightness and shortness of breath.    Cardiovascular:  Negative for chest pain.   Gastrointestinal:  Negative for abdominal pain and nausea.   Neurological:  Negative for dizziness and headaches.   Psychiatric/Behavioral:  Negative for agitation.        Historical Information   Past Medical History:  No date: Atrial fibrillation ([facility])  No date: Bladder stone  No date: C. difficile colitis  12/05/2024: Cataract, nuclear sclerotic senile, right  No date: Cellulitis  No date: CHF (congestive heart failure) ([facility])  No date: Depression with anxiety  No date: Disease of thyroid gland  No date: Diverticulitis  No date: Enterocolitis  No date: History of abnormal cervical Pap smear  No date: Kidney stone  No date: Lymphedema  No date: Menopause      Comment:  Age 50  No date: Morbid obesity with BMI of 70 and over, adult ([facility])  No date: MRSA (methicillin resistant Staphylococcus aureus)      Comment:  abdominal wound  No date: Osteoarthritis  No date: Phlebitis      Comment:  left lower leg  No date: Sleep apnea  No date: Spondylolysis Past Surgical History:  No date: APPENDECTOMY  No date: CARPAL TUNNEL RELEASE  No date: CATARACT EXTRACTION; Right  No date: CHOLECYSTECTOMY  No date: CYSTOSCOPY      Comment:  stent  No date: EGD  1982: FOOT SURGERY      Comment:  bone spur  No date: KNEE SURGERY  12/05/2024: NE ICAPSULAR CATARACT XTRJ INSJ IO LENS PRSTH 1 STG; Right      Comment:  Procedure: CATARACT EXTRACTION WITH IOL IMPLANTATION;                 Surgeon: Chantel Llanos MD;  Location: [facility];                 Service: Ophthalmology  No date: WISDOM TOOTH EXTRACTION   Current Outpatient Medications   Medication Instructions    acetaminophen (TYLENOL) 650 mg, Every 8 hours  PRN    Alcohol Swabs 70 % PADS May substitute brand based on insurance coverage. Check glucose BID.    allopurinol (ZYLOPRIM) 100 mg, Oral, Daily    benzonatate (TESSALON PERLES) 100 mg, Oral, Every 8 hours    Blood Glucose Monitoring Suppl (OneTouch Verio Reflect) w/Device KIT May substitute brand based on insurance coverage. Check glucose BID.    DULoxetine (CYMBALTA) 20 mg, Oral, Daily    furosemide (LASIX) 60 mg, Oral, Daily    gabapentin (NEURONTIN) 100 mg, Oral, 3 times daily    glucose blood (OneTouch Verio) test strip May substitute brand based on insurance coverage. Check glucose BID.    levothyroxine 137 mcg, Daily    midodrine (PROAMATINE) 2.5 mg, Oral, 3 times daily before meals    neomycin-bacitracin-polymyxin (NEOSPORIN) 5-400-5,000 ointment Daily    OneTouch Delica Lancets 33G MISC May substitute brand based on insurance coverage. Check glucose BID.    triamcinolone (KENALOG) 0.5 % cream 1 Application, As needed    warfarin (COUMADIN) 5 mg, Oral, Daily (warfarin)    Allergies   Allergen Reactions    Augmentin Es-600 [Amoxicillin-Pot Clavulanate] Anaphylaxis and Rash    Bactrim [Sulfamethoxazole-Trimethoprim] Anaphylaxis    Cefazolin Itching and Other (See Comments)     Patient developed itching and difficulty swallowing following IV cefazolin administration at [facility] 7/19/20 which required treatment with benadryl and epinephrine - has tolerated other cephalosporins with different side chains including cefepime, cefuroxime, and cephalexin    Ciprofloxacin Anaphylaxis    Doxycycline Anaphylaxis    Keflex [Cephalexin] Anaphylaxis    Penicillins Anaphylaxis, Rash and Other (See Comments)     Tolerates cefepime (11/18/21)    Other Rash, Irritability and Edema     Patient reports significant reaction to the use of Max Orb in the abdominal folds leading to swelling, excoriation, maceration and weeping of the skin.     Omeprazole GI Intolerance    Pork-Derived Products - Food Allergy Diarrhea    Sulfa  Antibiotics Hives    Latex Rash and Edema    Vitamin C [Ascorbate - Food Allergy] Rash      Social History     Tobacco Use    Smoking status: Former     Current packs/day: 0.00     Average packs/day: 5.0 packs/day for 3.0 years (15.0 ttl pk-yrs)     Types: Cigarettes     Start date: 1967     Quit date: 1970     Years since quittin.4    Smokeless tobacco: Never    Tobacco comments:     5 packs/day until  (age [age])    Vaping Use    Vaping status: Never Used   Substance Use Topics    Alcohol use: Not Currently    Drug use: Not Currently     Types: Marijuana     Comment: As a teen - As per [employer]     Family History   Problem Relation Age of Onset    Heart failure Mother     Heart disease Mother     Lymphoma Mother     Seizures Mother     Kidney disease Father     Heart disease Father     Heart failure Father     Diabetes type II Father     Diabetes type II Sister     Diabetes type II Brother     Uterine cancer Paternal Aunt     Breast cancer Neg Hx     Colon cancer Neg Hx     Ovarian cancer Neg Hx           Objective :                   Vitals I/O      Most Recent Min/Max in 24hrs   Temp 97.9 °F (36.6 °C) Temp  Min: 97.9 °F (36.6 °C)  Max: 97.9 °F (36.6 °C)   Pulse 64 Pulse  Min: 50  Max: 74   Resp 15 Resp  Min: 14  Max: 21   /54 BP  Min: 95/50  Max: 129/58   O2 Sat 98 % SpO2  Min: 85 %  Max: 99 %      Intake/Output Summary (Last 24 hours) at 2024 2340  [place] [address]  Gross per 24 hour   Intake --   Output 100 ml   Net -100 ml       Diet NPO    Invasive Monitoring           Physical Exam   Physical Exam  Vitals and nursing note reviewed.   Skin:     General: Skin is warm and dry.      Findings: Erythema present.      Comments: Multiple skin abnormalities noted throughout; chronic   HENT:      Head: Normocephalic.      Mouth/Throat:      Mouth: Mucous membranes are dry.   Cardiovascular:      Pulses: Normal pulses.   Musculoskeletal:         General: Swelling  present.      Right lower leg: Edema present.      Left lower leg: Edema present.   Abdominal: General: Bowel sounds are normal.      Palpations: Abdomen is soft.   Constitutional:       General: She is awake.      Appearance: She is morbidly obese. She is ill-appearing.      Comments: BiPAP in place   Pulmonary:      Breath sounds: Decreased breath sounds present.   Psychiatric:         Behavior: Behavior is cooperative.   Neurological:      Mental Status: She is alert and oriented to person, place and time. She is CAM ICU negative and not agitated.   Genitourinary/Anorectal:  [place] present.        Diagnostic Studies        Lab Results: I have reviewed the following results:     Medications:  Scheduled PRN   chlorhexidine, 15 mL, Q12H TEODORO          Continuous          Labs:   CBC    Recent Labs     12/11/24  1752 12/11/24  2331   WBC 13.69* 12.64*   HGB 9.7* 9.3*   HCT 35.4 34.4*   * 418*     BMP    Recent Labs     12/11/24  1752   SODIUM 138   K 4.0      CO2 33*   AGAP 5   BUN 33*   CREATININE 1.08   CALCIUM 8.5       Coags    No recent results     Additional Electrolytes  Recent Labs     12/11/24  1752   MG 2.1          Blood Gas    No recent results  Recent Labs     12/11/24  2144   PHVEN 7.264*   VUY3IEI 69.1*   PO2VEN 49.8*   LQL4NIL 30.6*   BEVEN 2.3   I4FRBWB 80.4*    LFTs  Recent Labs     12/11/24  1752   ALT 5*   AST 13   ALKPHOS 87   ALB 2.7*   TBILI 0.50       Infectious  No recent results  Glucose  Recent Labs     12/11/24  1752   GLUC 156*

## 2024-12-12 NOTE — ASSESSMENT & PLAN NOTE
A/e/b tachypnea and leukocytosis  Possibly in setting of CHF exacerbation as source unclear     Plan:  Will send septic work-up now  LA pending  Procal pending  UA pending  Urine lytes pending  Flu/COVID/RSV pending  Blood cx pending  Will hold off on abx for now as pt does not appear infectious  Trend wbc and fever curve

## 2024-12-12 NOTE — PLAN OF CARE
Problem: Potential for Falls  Goal: Patient will remain free of falls  Description: INTERVENTIONS:  - Educate patient/family on patient safety including physical limitations  - Instruct patient to call for assistance with activity   - Consult OT/PT to assist with strengthening/mobility   - Keep Call bell within reach  - Keep bed low and locked with side rails adjusted as appropriate  - Keep care items and personal belongings within reach  - Initiate and maintain comfort rounds  - Make Fall Risk Sign visible to staff  - Offer Toileting every 2 Hours, in advance of need  - Initiate/Maintain bed alarm  - Obtain necessary fall risk management equipment:   - Apply yellow socks and bracelet for high fall risk patients  - Consider moving patient to room near nurses station  Outcome: Progressing     Problem: PAIN - ADULT  Goal: Verbalizes/displays adequate comfort level or baseline comfort level  Description: Interventions:  - Encourage patient to monitor pain and request assistance  - Assess pain using appropriate pain scale  - Administer analgesics based on type and severity of pain and evaluate response  - Implement non-pharmacological measures as appropriate and evaluate response  - Consider cultural and social influences on pain and pain management  - Notify physician/advanced practitioner if interventions unsuccessful or patient reports new pain  Outcome: Progressing     Problem: INFECTION - ADULT  Goal: Absence or prevention of progression during hospitalization  Description: INTERVENTIONS:  - Assess and monitor for signs and symptoms of infection  - Monitor lab/diagnostic results  - Monitor all insertion sites, i.e. indwelling lines, tubes, and drains  - Monitor endotracheal if appropriate and nasal secretions for changes in amount and color  - [place] appropriate cooling/warming therapies per order  - Administer medications as ordered  - Instruct and encourage patient and family to use good hand hygiene  technique  - Identify and instruct in appropriate isolation precautions for identified infection/condition  Outcome: Progressing  Goal: Absence of fever/infection during neutropenic period  Description: INTERVENTIONS:  - Monitor WBC    Outcome: Progressing     Problem: SAFETY ADULT  Goal: Patient will remain free of falls  Description: INTERVENTIONS:  - Educate patient/family on patient safety including physical limitations  - Instruct patient to call for assistance with activity   - Consult OT/PT to assist with strengthening/mobility   - Keep Call bell within reach  - Keep bed low and locked with side rails adjusted as appropriate  - Keep care items and personal belongings within reach  - Initiate and maintain comfort rounds  - Make Fall Risk Sign visible to staff  - Offer Toileting every 2 Hours, in advance of need  - Initiate/Maintain bed alarm  - Obtain necessary fall risk management equipment:   - Apply yellow socks and bracelet for high fall risk patients  - Consider moving patient to room near nurses station  Outcome: Progressing  Goal: Maintain or return to baseline ADL function  Description: INTERVENTIONS:  -  Assess patient's ability to carry out ADLs; assess patient's baseline for ADL function and identify physical deficits which impact ability to perform ADLs (bathing, care of mouth/teeth, toileting, grooming, dressing, etc.)  - Assess/evaluate cause of self-care deficits   - Assess range of motion  - Assess patient's mobility; develop plan if impaired  - Assess patient's need for assistive devices and provide as appropriate  - Encourage maximum independence but intervene and supervise when necessary  - Involve family in performance of ADLs  - Assess for home care needs following discharge   - Consider OT consult to assist with ADL evaluation and planning for discharge  - Provide patient education as appropriate  Outcome: Progressing  Goal: Maintains/Returns to pre admission functional  level  Description: INTERVENTIONS:  - Perform AM-PAC 6 Click Basic Mobility/ Daily Activity assessment daily.  - Set and communicate daily mobility goal to care team and patient/family/caregiver.   - Collaborate with rehabilitation services on mobility goals if consulted  - Perform Range of Motion 3 times a day.  - Reposition patient every 2 hours.  - Record patient progress and toleration of activity level   Outcome: Progressing     Problem: Knowledge Deficit  Goal: Patient/family/caregiver demonstrates understanding of disease process, treatment plan, medications, and discharge instructions  Description: Complete learning assessment and assess knowledge base.  Interventions:  - Provide teaching at level of understanding  - Provide teaching via preferred learning methods  Outcome: Progressing     Problem: NEUROSENSORY - ADULT  Goal: Achieves stable or improved neurological status  Description: INTERVENTIONS  - Monitor and report changes in neurological status  - Monitor vital signs such as temperature, blood pressure, glucose, and any other labs ordered   - Initiate measures to prevent increased intracranial pressure  - Monitor for seizure activity and implement precautions if appropriate      Outcome: Progressing  Goal: Remains free of injury related to seizures activity  Description: INTERVENTIONS  - Maintain airway, patient safety  and administer oxygen as ordered  - Monitor patient for seizure activity, document and report duration and description of seizure to physician/advanced practitioner  - If seizure occurs,  ensure patient safety during seizure  - Reorient patient post seizure  - Seizure pads on all 4 side rails  - Instruct patient/family to notify RN of any seizure activity including if an aura is experienced  - Instruct patient/family to call for assistance with activity based on nursing assessment  - Administer anti-seizure medications if ordered    Outcome: Progressing  Goal: Achieves maximal  functionality and self care  Description: INTERVENTIONS  - Monitor swallowing and airway patency with patient fatigue and changes in neurological status  - Encourage and assist patient to increase activity and self care.   - Encourage visually impaired, hearing impaired and aphasic patients to use assistive/communication devices  Outcome: Progressing     Problem: CARDIOVASCULAR - ADULT  Goal: Maintains optimal cardiac output and hemodynamic stability  Description: INTERVENTIONS:  - Monitor I/O, vital signs and rhythm  - Monitor for S/S and trends of decreased cardiac output  - Administer and titrate ordered vasoactive medications to optimize hemodynamic stability  - Assess quality of pulses, skin color and temperature  - Assess for signs of decreased coronary artery perfusion  - Instruct patient to report change in severity of symptoms  Outcome: Progressing  Goal: Absence of cardiac dysrhythmias or at baseline rhythm  Description: INTERVENTIONS:  - Continuous cardiac monitoring, vital signs, obtain 12 lead EKG if ordered  - Administer antiarrhythmic and heart rate control medications as ordered  - Monitor electrolytes and administer replacement therapy as ordered  Outcome: Progressing     Problem: RESPIRATORY - ADULT  Goal: Achieves optimal ventilation and oxygenation  Description: INTERVENTIONS:  - Assess for changes in respiratory status  - Assess for changes in mentation and behavior  - Position to facilitate oxygenation and minimize respiratory effort  - Oxygen administered by appropriate delivery if ordered  - Initiate smoking cessation education as indicated  - Encourage broncho-pulmonary hygiene including cough, deep breathe, Incentive Spirometry  - Assess the need for suctioning and aspirate as needed  - Assess and instruct to report SOB or any respiratory difficulty  - Respiratory Therapy support as indicated  Outcome: Progressing     Problem: GENITOURINARY - ADULT  Goal: Maintains or returns to baseline  urinary function  Description: INTERVENTIONS:  - Assess urinary function  - Encourage oral fluids to ensure adequate hydration if ordered  - Administer IV fluids as ordered to ensure adequate hydration  - Administer ordered medications as needed  - Offer frequent toileting  - Follow urinary retention protocol if ordered  Outcome: Progressing  Goal: Absence of urinary retention  Description: INTERVENTIONS:  - Assess patient’s ability to void and empty bladder  - Monitor I/O  - Bladder scan as needed  - Discuss with physician/AP medications to alleviate retention as needed  - Discuss catheterization for long term situations as appropriate  Outcome: Progressing  Goal: Urinary catheter remains patent  Description: INTERVENTIONS:  - Assess patency of urinary catheter  - If patient has a chronic sadler, consider changing catheter if non-functioning  - Follow guidelines for intermittent irrigation of non-functioning urinary catheter  Outcome: Progressing     Problem: METABOLIC, FLUID AND ELECTROLYTES - ADULT  Goal: Electrolytes maintained within normal limits  Description: INTERVENTIONS:  - Monitor labs and assess patient for signs and symptoms of electrolyte imbalances  - Administer electrolyte replacement as ordered  - Monitor response to electrolyte replacements, including repeat lab results as appropriate  - Instruct patient on fluid and nutrition as appropriate  Outcome: Progressing  Goal: Fluid balance maintained  Description: INTERVENTIONS:  - Monitor labs   - Monitor I/O and WT  - Instruct patient on fluid and nutrition as appropriate  - Assess for signs & symptoms of volume excess or deficit  Outcome: Progressing  Goal: Glucose maintained within target range  Description: INTERVENTIONS:  - Monitor Blood Glucose as ordered  - Assess for signs and symptoms of hyperglycemia and hypoglycemia  - Administer ordered medications to maintain glucose within target range  - Assess nutritional intake and initiate nutrition  service referral as needed  Outcome: Progressing     Problem: Nutrition/Hydration-ADULT  Goal: Nutrient/Hydration intake appropriate for improving, restoring or maintaining nutritional needs  Description: Monitor and assess patient's nutrition/hydration status for malnutrition. Collaborate with interdisciplinary team and initiate plan and interventions as ordered.  Monitor patient's weight and dietary intake as ordered or per policy. Utilize nutrition screening tool and intervene as necessary. Determine patient's food preferences and provide high-protein, high-caloric foods as appropriate.     INTERVENTIONS:  - Monitor oral intake, urinary output, labs, and treatment plans  - Assess nutrition and hydration status and recommend course of action  - Evaluate amount of meals eaten  - Assist patient with eating if necessary   - Allow adequate time for meals  - Recommend/ encourage appropriate diets, oral nutritional supplements, and vitamin/mineral supplements  - Order, calculate, and assess calorie counts as needed  - Recommend, monitor, and adjust tube feedings and TPN/PPN based on assessed needs  - Assess need for intravenous fluids  - Provide specific nutrition/hydration education as appropriate  - Include patient/family/caregiver in decisions related to nutrition  Outcome: Progressing     Problem: Prexisting or High Potential for Compromised Skin Integrity  Goal: Skin integrity is maintained or improved  Description: INTERVENTIONS:  - Identify patients at risk for skin breakdown  - Assess and monitor skin integrity  - Assess and monitor nutrition and hydration status  - Monitor labs   - Assess for incontinence   - Turn and reposition patient  - Assist with mobility/ambulation  - Relieve pressure over bony prominences  - Avoid friction and shearing  - Provide appropriate hygiene as needed including keeping skin clean and dry  - Evaluate need for skin moisturizer/barrier cream  - Collaborate with interdisciplinary  team   - Patient/family teaching  - Consider wound care consult   Outcome: Progressing

## 2024-12-12 NOTE — CONSULTS
DERMATOLOGY:  CONSULTATION   Zee Kirk 74 y.o. female MRN: [id number]  Unit/Bed#: ICU 07-01 Encounter: [id number]          Inpatient consult to Dermatology     Date/Time  12/12/2024 1:11 PM     Performed by  Viviane Manuel MD   Authorized by  ORACIO Mohamud                 DERM CONSULT - Attending and resident present VIRTUALLY. Patient photos reviewed. Patient in hospital setting.  Assessment can only be provided based on clinical images received and symptoms conveyed.       Assessment/Recommendations     Ddx: allergic contact dermatitis vs Tinea  Patient currently in the ICU with Kenalog and Neosporin for acute respiratory failure.  Dermatology was consulted for erythematous scaling rash at the request of wound care.  Patient has multiple wounds that are attended to by wound care as an outpatient with Kenalog and Neosporin.  The rash seems to be dispersed along the back, trunk, and extremities.  Lesions in the back look to be more fungal in appearance, and may be due to bedbound nature of the patient with sweating accumulation etc.  Contact Derm could also be an etiology perhaps secondary to Neosporin use. Few impetiginized lesions are also present on what seems to be that lower extremity, however exam limited by pictures.     Plan:  -Start topical ketoconazole cream twice daily for 2 weeks to affected areas  -Please discontinue Neosporin use as may be a culprit for allergic contact dermatitis  -Please give dermatology updates on if rash is improving over the next week or so  -Notify on-call dermatology resident at discharge so that outpatient follow-up can be scheduled        Please set up discharge follow up by calling our office: [phone])     Thank you for involving me in the care of your patient. Please call with questions, change in clinical status or if tests recommended above are abnormal.     Discussed with the primary service.      History:     HISTORY OF PRESENT  ILLNESS:    Reason for Consult: rash  HPI: Zee Kirk is a 74 y.o. year old female w/PMHx of pAF-Coumadin, HLD, HFpEF, DAVID-not on CPAP, hypotension-on Midodrine, CKD3A, hypothyroidism, lymphedema, DM2, gout, anxiety/depression, morbid obesity, ambulatory dysfx-bedbound at baseline who was admitted on 12/11 for acute hypoxic respiratory failure/acute on chronic hypercapneic respiratory failure. Dermatology was consulted per note review at the request of wound care for what looks to be an erythematous scaling diffuse rash. No other symptoms of the rash is noted per chart review.       Review of Systems:  Review of Systems negative unless stated in HPI      PAST MEDICAL HISTORY:  Past Medical History:   Diagnosis Date    Atrial fibrillation (HCC)     Bladder stone     C. difficile colitis     Cataract, nuclear sclerotic senile, right 12/05/2024    Cellulitis     CHF (congestive heart failure) ([facility])     Depression with anxiety     Disease of thyroid gland     Diverticulitis     Enterocolitis     History of abnormal cervical Pap smear     Kidney stone     Lymphedema     Menopause     Age 50    Morbid obesity with BMI of 70 and over, adult ([facility])     MRSA (methicillin resistant Staphylococcus aureus)     abdominal wound    Osteoarthritis     Phlebitis     left lower leg    Sleep apnea     Spondylolysis      Past Surgical History:   Procedure Laterality Date    APPENDECTOMY      CARPAL TUNNEL RELEASE      CATARACT EXTRACTION Right     CHOLECYSTECTOMY      CYSTOSCOPY      stent    EGD      FOOT SURGERY  1982    bone spur    KNEE SURGERY      RI ICAPSULAR CATARACT XTRJ INSJ IO LENS PRSTH 1 STG Right 12/05/2024    Procedure: CATARACT EXTRACTION WITH IOL IMPLANTATION;  Surgeon: Chantel Llanos MD;  Location: [facility];  Service: Ophthalmology    WISDOM TOOTH EXTRACTION         FAMILY HISTORY:  Non-contributory    SOCIAL HISTORY:  Social History   Single  Social History     Substance and Sexual Activity   Alcohol Use  Not Currently     Social History     Substance and Sexual Activity   Drug Use Not Currently    Types: Marijuana    Comment: As a teen - As per [employer]      Social History     Tobacco Use   Smoking Status Former    Current packs/day: 0.00    Average packs/day: 5.0 packs/day for 3.0 years (15.0 ttl pk-yrs)    Types: Cigarettes    Start date: 1967    Quit date: 1970    Years since quittin.4   Smokeless Tobacco Never   Tobacco Comments    5 packs/day until  (age [age])        ALLERGIES:  Allergies   Allergen Reactions    Augmentin Es-600 [Amoxicillin-Pot Clavulanate] Anaphylaxis and Rash    Bactrim [Sulfamethoxazole-Trimethoprim] Anaphylaxis    Cefazolin Itching and Other (See Comments)     Patient developed itching and difficulty swallowing following IV cefazolin administration at [facility] 20 which required treatment with benadryl and epinephrine - has tolerated other cephalosporins with different side chains including cefepime, cefuroxime, and cephalexin    Ciprofloxacin Anaphylaxis    Doxycycline Anaphylaxis    Keflex [Cephalexin] Anaphylaxis    Penicillins Anaphylaxis, Rash and Other (See Comments)     Tolerates cefepime (21)    Other Rash, Irritability and Edema     Patient reports significant reaction to the use of Max Orb in the abdominal folds leading to swelling, excoriation, maceration and weeping of the skin.     Omeprazole GI Intolerance    Pork-Derived Products - Food Allergy Diarrhea    Sulfa Antibiotics Hives    Latex Rash and Edema    Vitamin C [Ascorbate - Food Allergy] Rash       MEDICATIONS:  All current active medications have been reviewed.       Physical exam:     Temp:  [97.6 °F (36.4 °C)-97.9 °F (36.6 °C)] 97.6 °F (36.4 °C)  HR:  [] 78  Resp:  [14-42] 26  BP: ()/(49-59) 102/54  SpO2:  [85 %-100 %] 97 %  Temp (24hrs), Av.7 °F (36.5 °C), Min:97.6 °F (36.4 °C), Max:97.9 °F (36.6 °C)  Current: Temperature: 97.6 °F (36.4 °C)      PHOTOGRAPHIC SKIN EXAMINATION                            Labs, Imaging, & Other Studies     Lab Results:  I have personally reviewed pertinent labs.     Results from last 7 days   Lab Units 12/12/24  0454 12/11/24  2331 12/11/24  1752   WBC Thousand/uL 11.95* 12.64* 13.69*   HEMOGLOBIN g/dL 8.9* 9.3* 9.7*   PLATELETS Thousands/uL 415* 418* 485*     Results from last 7 days   Lab Units 12/12/24  0454 12/11/24  2331 12/11/24  1752   POTASSIUM mmol/L 4.0 4.0 4.0   CHLORIDE mmol/L 102 102 100   CO2 mmol/L 34* 29 33*   BUN mg/dL 33* 33* 33*   CREATININE mg/dL 0.94 1.01 1.08   EGFR ml/min/1.73sq m 60 55 51   CALCIUM mg/dL 8.2* 8.4 8.5   AST U/L  --  12* 13   ALT U/L  --  4* 5*   ALK PHOS U/L  --  86 87     Results from last 7 days   Lab Units 12/12/24  0034 12/12/24  0001 12/11/24  2331   BLOOD CULTURE  Received in Microbiology Lab. Culture in Progress.  --  Received in Microbiology Lab. Culture in Progress.   LEGIONELLA URINARY ANTIGEN   --  Negative  --            Pathology:   I have personally reviewed pertinent reports.       Viviane Manuel MD  Dermatology, PGY-2

## 2024-12-12 NOTE — PROGRESS NOTES
Progress Note - Critical Care/ICU   Name: Zee Kirk 74 y.o. female I MRN: [id number]  Unit/Bed#: ICU  I Date of Admission: 2024   Date of Service: 2024 I Hospital Day: 1      Assessment & Plan  Acute respiratory failure with hypoxia and hypercapnia (HCC)  Pt presented to the ED with worsening sob over the past 3 days. In the ED, pt was placed on 6L NC. When she would have periods of somnolence, her O2 sats would drop into the 50's. Pt was then placed on BiPAP. Of note, pt does not wear O2 at baseline or CPAP/BiPAP at home. On previous admission, pt reported a hx of DAVID, however she also stated she was not interested in a sleep study.  Lab work was significant for mild leukocytosis, elevated BNP. VB.310/70.7/35.2/34.8. Troponins were negative. Pt was given 80 mg IV Lasix for vascular congestion.  () CXR: noted for vascular congestion    Plan:  Flu/COVID/RSV negative  MRSA pending  Wean supplemental O2 to maintain SpO2> 92%  Will check an ABG  PRN diuresis  Acute on chronic diastolic congestive heart failure (HCC)  Wt Readings from Last 3 Encounters:   24 (!) 213 kg (469 lb)   24 (!) 193 kg (425 lb)   11/10/24 (!) 193 kg (424 lb 12.8 oz)     BNP on admission was 242  () CXR noted for pulmonary congestion  Pt was given 80 mg IV Lasix in the ED. Of note, pt does take 60 mg PO Lasix BID as an outpatient  (24) ECHO: EF 60%    Plan:  Lasix PRN  Daily weights  Strict I/O  Hold on IVF for now given volume overload        Hypothyroid  Plan:  Continue home Synthroid when able to tolerate PO intake  Class 3 severe obesity due to excess calories with serious comorbidity and body mass index (BMI) greater than or equal to 70 in adult (HCC)  Pt with 30lb weight gain since last documented weight on last hospital admission which was from 24-11/10/24    Plan:  Consider Nutrition consult  Encourage healthy lifestyle modifications  Depression with anxiety  Plan:  Continue  home Cymbalta when able to tolerate PO intake  Impaired mobility  Pt is reported to be bed bound    Plan:  PT/OT when appropriate  Frequent turn and repositioning   Cataract, nuclear sclerotic senile, right  Pt underwent R cataract extraction with implantation on 12/5/24  Paroxysmal A-fib (HCC)  Pt on Coumadin for anticoagulation  Currently rate controlled    Plan:  Continue home Coumadin  Will check INR level   Continuous cardiac monitoring  History of kidney stones  Plan:  Continue home Allopurinol when able to tolerate PO  Skin abnormalities  Pt with multiple weeping wounds  Pt uses Kenalog and Neosporin for wound care at home  According to documentation from most recent hospitalization, pt is well known to wound care team    Plan:  Will place wound consult  Frequent turning and repositioning  Sepsis (HCC)  A/e/b tachypnea and leukocytosis  Possibly in setting of CHF exacerbation as source unclear     Plan:  Will send septic work-up now  LA negative  Procal negative  UA negative  Urine lytes pending  Flu/COVID/RSV negative  Blood cx pending  Will hold off on abx for now as pt does not appear infectious  Trend wbc and fever curve  Disposition: Stepdown Level 1    ICU Core Measures     A: Assess, Prevent, and Manage Pain Has pain been assessed? Yes  Need for changes to pain regimen? No   B: Both SAT/SAT  N/A   C: Choice of Sedation RASS Goal: 0 Alert and Calm or N/A patient not on sedation  Need for changes to sedation or analgesia regimen? NA   D: Delirium CAM-ICU: Unable to perform secondary to Acute cognitive dysfunction   E: Early Mobility  Plan for early mobility? Yes   F: Family Engagement Plan for family engagement today? Yes       Review of Invasive Devices:    [place] Plan: Continue for accurate I/O monitoring for 48 hours        Prophylaxis:  VTE VTE covered by:    None       Stress Ulcer  not ordered         24 Hour Events : BiPAP continues. No acute events overnight.     Subjective   Review of Systems:  Review of Systems   Constitutional:  Positive for fatigue.   Respiratory:  Negative for shortness of breath.    Cardiovascular:  Negative for chest pain.   Gastrointestinal:  Negative for abdominal pain and nausea.   Neurological:  Negative for dizziness and headaches.   Psychiatric/Behavioral:  The patient is nervous/anxious.        Objective :                   Vitals I/O      Most Recent Min/Max in 24hrs   Temp 97.6 °F (36.4 °C) Temp  Min: 97.6 °F (36.4 °C)  Max: 97.9 °F (36.6 °C)   Pulse (!) 111 Pulse  Min: 50  Max: 111   Resp (!) 42 Resp  Min: 14  Max: 42   /55 BP  Min: 95/50  Max: 129/58   O2 Sat 99 % SpO2  Min: 85 %  Max: 99 %      Intake/Output Summary (Last 24 hours) at 12/12/2024 0102  Last data filed at 12/12/2024 0000  Gross per 24 hour   Intake --   Output 400 ml   Net -400 ml       Diet NPO    Invasive Monitoring           Physical Exam   Physical Exam  Vitals and nursing note reviewed.   Skin:     General: Skin is warm and dry.      Capillary Refill: Capillary refill takes less than 2 seconds.      Comments: Generalized skin abnormalities noted throughout; chronic   HENT:      Head: Normocephalic.      Mouth/Throat:      Mouth: Mucous membranes are dry.   Cardiovascular:      Pulses: Normal pulses.   Musculoskeletal:         General: Normal range of motion.   Abdominal: General: Bowel sounds are normal.      Palpations: Abdomen is soft.   Constitutional:       General: She is awake.      Appearance: She is morbidly obese. She is ill-appearing.      Comments: BiPAP in place   Pulmonary:      Breath sounds: Decreased breath sounds present.   Psychiatric:         Behavior: Behavior is cooperative.   Neurological:      Mental Status: She is alert.   Genitourinary/Anorectal:  [place] present.        Diagnostic Studies        Lab Results: I have reviewed the following results:     Medications:  Scheduled PRN   chlorhexidine, 15 mL, Q12H TEODORO          Continuous          Labs:   CBC    Recent Labs      12/11/24 1752 12/11/24 2331   WBC 13.69* 12.64*   HGB 9.7* 9.3*   HCT 35.4 34.4*   * 418*     BMP    Recent Labs     12/11/24 1752 12/11/24 2331   SODIUM 138 138   K 4.0 4.0    102   CO2 33* 29   AGAP 5 7   BUN 33* 33*   CREATININE 1.08 1.01   CALCIUM 8.5 8.4       Coags    No recent results     Additional Electrolytes  Recent Labs     12/11/24 1752 12/11/24 2331 12/12/24  0034   MG 2.1 2.0  --    PHOS  --  3.5  --    CAIONIZED  --   --  1.11*          Blood Gas    No recent results  Recent Labs     12/11/24  2144   PHVEN 7.264*   SKR6ESK 69.1*   PO2VEN 49.8*   DUC1LWI 30.6*   BEVEN 2.3   V2MTCZU 80.4*    LFTs  Recent Labs     12/11/24 1752 12/11/24 2331   ALT 5* 4*   AST 13 12*   ALKPHOS 87 86   ALB 2.7* 2.7*   TBILI 0.50 0.42       Infectious  Recent Labs     12/11/24  2331   PROCALCITONI 0.06     Glucose  Recent Labs     12/11/24 1752 12/11/24  2331   GLUC 156* 118

## 2024-12-12 NOTE — ASSESSMENT & PLAN NOTE
Pt on Coumadin for anticoagulation  Currently rate controlled    Plan:  Continue home Coumadin  Will check INR level   Continuous cardiac monitoring

## 2024-12-12 NOTE — NURSING NOTE
0700- Assumed care of pt. Pt resting comfortably on Bipap. Awake, alert and oriented    0800- Assessment complete. See flowsheets. Pt remains on bipap, awake, alert, follow commands. Lungs diminished. Multiple areas of skin w/ wounds, redness and excoriation. See images.     0900- Wound care complete. Pt refusing any products other than Kenalog cream, powder and ABD's. Refusing foam dressings on heels and buttocks. Refusing SCDs. Refusing to positioned with wedges.     1113- Pt taken off Bipap by respiratory at this time. On room air    1145- Pt passed bedside swallow evaluation    1300- Pt downgraded to MedSurg. Telemetry removed    1530- Pt bathed with her soap that the caregiver brought in from home. Wound care complete. All affected areas cleaned, dried thoroughly kenalog applied, powder applied and ABD pads placed in areas pt directed us to place. New gown, new linens. Pt still refusing to be positioned with wedges.

## 2024-12-12 NOTE — PROGRESS NOTES
Progress Note - Critical Care/ICU   Name: Zee Kirk 74 y.o. female I MRN: [id number]  Unit/Bed#: ICU 07-01 I Date of Admission: 12/11/2024   Date of Service: 12/12/2024 I Hospital Day: 1       Critical Care Interval Transfer Note:    Brief Hospital Summary: Zee Kirk is a 73 yo female w/PMHx of pAF-Coumadin, HLD, HFpEF, DAVID-not on CPAP, hypotension-on Midodrine, CKD3A, hypothyroidism, lymphedema, DM2, gout, anxiety/depression, morbid obesity, ambulatory dysfx-bedbound at baseline who was admitted on 12/11 for acute hypoxic respiratory failure/acute on chronic hypercapneic respiratory failure likely 2/2 acute on chronic diastolic HF. She was given IV Lasix 80 mg x1 and placed on BIPAP overnight. This AM, VBG was improved and patient was able to be weaned from BIPAP to RA.     Barriers to discharge:   Continue BIPAP qHS for DAVID - plan for bedside spirometry in attempt to qualify patient for IPAP as outpatient  [place] cath removed today - urinary retention protocol  Home medications including allopurinol, Lasix, Neurontin, Synthroid, and Midodrine restarted  Takes Coumadin 2 mg M/T/W/F/Sa/Marquez and 4 mg Th  Home Coumadin 4 mg ordered for today and PT/INR ordered for tomorrow AM  Will need Coumadin dosing order placed for tomorrow   Dermatology consulted per Wound Care's request  PT/OT     Consults: IP CONSULT TO NUTRITION SERVICES  IP CONSULT TO DERMATOLOGY    Recommended to review admission imaging for incidental findings and document in discharge navigator: Chart reviewed, no known incidental findings noted at this time.      Discharge Plan: Anticipate discharge in 24-48 hrs to discharge location to be determined pending rehab evaluations.  [place] Plan: [place] to be removed. Order has been placed    PT Recommendations: Home with home health rehabilitationOT Recommendations: Home with home health rehabilitation   Patient seen and evaluated by Critical Care today and deemed to be appropriate for  transfer to Med Surg. Spoke to Dr. Murdock from [facility] to accept transfer. Critical care can be contacted via SecureChat with any questions or concerns. Please use the Critical Care AP Role in Secure Chat for any provider inquires until the patient is transferred out of the ICU or until tomorrow at 0600.

## 2024-12-12 NOTE — ASSESSMENT & PLAN NOTE
Pt presented to the ED with worsening sob over the past 3 days. In the ED, pt was placed on 6L NC. When she would have periods of somnolence, her O2 sats would drop into the 50's. Pt was then placed on BiPAP. Of note, pt does not wear O2 at baseline or CPAP/BiPAP at home. On previous admission, pt reported a hx of DAVID, however she also stated she was not interested in a sleep study.  Lab work was significant for mild leukocytosis, elevated BNP. VB.310/70.7/35.2/34.8. Troponins were negative. Pt was given 80 mg IV Lasix for vascular congestion.  () CXR: noted for vascular congestion    Plan:  Flu/COVID/RSV negative  MRSA pending  Wean supplemental O2 to maintain SpO2> 92%  Will check an ABG  PRN diuresis

## 2024-12-12 NOTE — ASSESSMENT & PLAN NOTE
Pt with 30lb weight gain since last documented weight on last hospital admission which was from 11/6/24-11/10/24    Plan:  Consider Nutrition consult  Encourage healthy lifestyle modifications

## 2024-12-12 NOTE — ASSESSMENT & PLAN NOTE
Wt Readings from Last 3 Encounters:   12/11/24 (!) 213 kg (469 lb)   12/05/24 (!) 193 kg (425 lb)   11/10/24 (!) 193 kg (424 lb 12.8 oz)     BNP on admission was 242  (12/11) CXR noted for pulmonary congestion  Pt was given 80 mg IV Lasix in the ED. Of note, pt does take 60 mg PO Lasix BID as an outpatient  (11/7/24) ECHO: EF 60%    Plan:  Lasix PRN  Daily weights  Strict I/O  Hold on IVF for now given volume overload

## 2024-12-12 NOTE — QUICK NOTE
Patient updated at bedside regarding plan of care going forward. All questions/concerns were answered and addressed.

## 2024-12-12 NOTE — WOUND OSTOMY CARE
Consult Note - Wound   eZe Kirk 74 y.o. female MRN: [id number]  Unit/Bed#: ICU 07-01 Encounter: [id number]        Assessment :   Patient admitted to [facility] due to acute respiratory failure. History of a-fib., CHF, lymphedema, sleep apnea. Wound care nurse consulted for generalized wounds and rash. Patient is agreeable to assessment, alert and oriented x4, continent of bowel, sadler catheter in place for urine management, is in ICU on a bariatric low air loss mattress, was on BiPAP this morning, is assist of 4 to turn for assessment, ATR in place to assist with turning/repositioning, patient is refusing use of wedges for repositioning/offloading. Dermatology to be consulted due to generalized rash.     Patient will only allow specific dressings to be placed: Kenalog cream with ABD pads. Patient states she is allergic to all other dressings and will not allow them to be placed.    1. Bilateral axilla- Wounds are scattered, oval in shape, partial thickness, 100% pink tissue, with scant amount of serosanguineous drainage noted. Alison-wounds are fragile, intact, with scattered irregular red rash.     2. Left back- Wounds are scattered, oval/irregular in shape, partial thickness, 100% pink tissue, with scant amount of serosanguineous drainage noted. Alison-wounds are fragile, intact, with scattered irregular red rash.     3. Left lateral hip- Wounds are scattered, oval/irregular in shape, approx. 20% yellow tissue and 80% pink partial thickness tissue, with moderate amount of serous drainage noted. Alison-wounds are dry, intact, edematous, erythematous rash.     4. MASD/ITD abdominal pannus- Wounds are scattered throughout pannus, are linear and oval in shape, partial thickness, approx. 20% yellow tissue and 80% pink tissue, with scant amount of serosanguineous drainage noted. Alison-wounds are dry, intact, with red rash.    5. MASD/ITD left breast skin fold- Wounds are scattered, oval/irregular in shape, partial  thickness, 100% pink tissue, with scant amount of serosanguineous drainage noted. Alison-wounds are fragile, intact, with scattered irregular red rash.     6. Bilateral thigh and knee skin folds- Skin on assessment is dry and intact, slightly macerated, no open wounds noted. Skin noted with red irregular rash.    7. Bilateral elbows, sacrum, buttock, heels, and right hip- skin is dry, intact, blanchable.     8. Bilateral upper chest noted with excoriations and dry areas of old drainage- suspect patient has been scratching at skin and causing trauma.    Educated patient on importance of frequent offloading of pressure via turning, repositioning, and weight-shifting. Patient continues to refuse use of wedges for offloading.     No induration, fluctuance, or purulence noted to the above noted wounds. New dressings applied. Patient tolerated fairly well, reports moderate pain to the wounds. Primary nurse aware of plan of care. See flow sheets for more detailed assessment findings. Will follow along.    Skin care plans:  1-Hydraguard/Silicone Cream to bilateral sacrum, buttock, and heels BID and PRN  2-Elevate heels to offload pressure.  3-Ehob cushion in chair when out of bed.  4-Moisturize skin daily with skin nourishing cream.  5-Turn/reposition q2h or when medically stable for pressure re-distribution on skin.   6-B/L axilla, B/L breast, b/L abdominal pannus- Cleanse skin with soap and water, pat dry. Apply Kenalog cream to ABD pad and place over wounds. Change daily and as needed for soilage/displacement.  7-Left back and left hip- Cleanse wounds with soap and water, pat dry. Apply calcium Alginate over wound beds and cover with ABD pad, change daily and as needed for soilage/displacement (patient states she has allergy to tape and silicone foam dressings so cannot secure dressing). Can attempt to secure dressing with Tegaderm.  8-B/L thigh and knee skin folds- Cleanse skin with soap and water, pat dry. Apply  Melgisorb rope to skin folds. Change daily and as needed.  9- Bariatric low air loss mattress.    Wound 05/24/24 Thigh Anterior;Left (Active)   Wound Image   12/12/24 1237   Wound Description Dry;Intact 12/12/24 1237   Alison-wound Assessment Dry;Intact 12/12/24 1237   Wound Length (cm) 0 cm 12/12/24 1237   Wound Width (cm) 0 cm 12/12/24 1237   Wound Depth (cm) 0 cm 12/12/24 1237   Wound Surface Area (cm^2) 0 cm^2 12/12/24 1237   Wound Volume (cm^3) 0 cm^3 12/12/24 1237   Calculated Wound Volume (cm^3) 0 cm^3 12/12/24 1237   Change in Wound Size % 100 12/12/24 1237       Wound 11/07/24 Back Medial;Upper (Active)   Wound Image   12/12/24 1243   Wound Description Pink 12/12/24 1243   Alison-wound Assessment Dry;Intact;Rash;Scaly;Erythema 12/12/24 1243   Wound Length (cm) 18 cm 12/12/24 1243   Wound Width (cm) 6 cm 12/12/24 1243   Wound Depth (cm) 0.1 cm 12/12/24 1243   Wound Surface Area (cm^2) 108 cm^2 12/12/24 1243   Wound Volume (cm^3) 10.8 cm^3 12/12/24 1243   Calculated Wound Volume (cm^3) 10.8 cm^3 12/12/24 1243   Drainage Amount Scant 12/12/24 1243   Drainage Description Serosanguineous 12/12/24 1243   Non-staged Wound Description Partial thickness 12/12/24 1243   Treatments Cleansed;Site care 12/12/24 1243   Dressing ABD 12/12/24 1243   Wound packed? No 12/12/24 1243   Dressing Changed Changed 12/12/24 1243   Patient Tolerance Tolerated well 12/12/24 1243   Dressing Status Clean;Dry;Intact 12/12/24 1243       Wound 11/07/24 Abdomen Medial;Left;Lower;Right (Active)   Wound Image      12/12/24 1231   Wound Description Pink;Yellow 12/12/24 1231   Alison-wound Assessment Rash;Intact 12/12/24 1231   Wound Length (cm) 1.5 cm 12/12/24 1231   Wound Width (cm) 84 cm 12/12/24 1231   Wound Depth (cm) 0.1 cm 12/12/24 1231   Wound Surface Area (cm^2) 126 cm^2 12/12/24 1231   Wound Volume (cm^3) 12.6 cm^3 12/12/24 1231   Calculated Wound Volume (cm^3) 12.6 cm^3 12/12/24 1231   Drainage Amount Scant 12/12/24 1231   Drainage  Description Serosanguineous 12/12/24 1231   Non-staged Wound Description Partial thickness 12/12/24 1231   Treatments Cleansed;Site care 12/12/24 1231   Dressing ABD;Other (Comment) 12/12/24 1231   Dressing Changed Reinforced 12/12/24 1231   Patient Tolerance Tolerated well 12/12/24 1231   Dressing Status Dry;Intact;Clean 12/12/24 1231       Wound 11/07/24 Breast Lateral;Left (Active)   Wound Image   12/12/24 1235   Wound Description Pink 12/12/24 1235   Alison-wound Assessment Rash;Scaly 12/12/24 1235   Wound Length (cm) 4 cm 12/12/24 1235   Wound Width (cm) 8 cm 12/12/24 1235   Wound Depth (cm) 0.1 cm 12/12/24 1235   Wound Surface Area (cm^2) 32 cm^2 12/12/24 1235   Wound Volume (cm^3) 3.2 cm^3 12/12/24 1235   Calculated Wound Volume (cm^3) 3.2 cm^3 12/12/24 1235   Drainage Amount Scant 12/12/24 1235   Drainage Description Serosanguineous 12/12/24 1235   Non-staged Wound Description Partial thickness 12/12/24 1235   Treatments Cleansed;Irrigation with NSS;Site care 12/12/24 1235   Dressing ABD;Other (Comment) 12/12/24 1235   Dressing Changed Reinforced 12/12/24 1235   Patient Tolerance Tolerated well 12/12/24 1235   Dressing Status Clean;Dry;Intact 12/12/24 1235       Wound 11/07/24 Hip Left;Proximal (Active)   Wound Image   12/12/24 1240   Wound Description Yellow;Pink 12/12/24 1240   Alison-wound Assessment Dry;Intact;Edema;Erythema 12/12/24 1240   Wound Length (cm) 12 cm 12/12/24 1240   Wound Width (cm) 9 cm 12/12/24 1240   Wound Depth (cm) 0.1 cm 12/12/24 1240   Wound Surface Area (cm^2) 108 cm^2 12/12/24 1240   Wound Volume (cm^3) 10.8 cm^3 12/12/24 1240   Calculated Wound Volume (cm^3) 10.8 cm^3 12/12/24 1240   Drainage Amount Moderate 12/12/24 1240   Drainage Description Serous 12/12/24 1240   Non-staged Wound Description Partial thickness 12/12/24 1240   Treatments Cleansed;Irrigation with NSS;Site care 12/12/24 1240   Dressing Calcium Alginate;ABD 12/12/24 1240   Wound packed? No 12/12/24 1240       Wound  12/11/24 Axilla Left (Active)   Wound Image   12/12/24 1236   Wound Description Pink;Fragile 12/12/24 1236   Alison-wound Assessment Intact;Rash;Erythema 12/12/24 1236   Wound Length (cm) 20 cm 12/12/24 1236   Wound Width (cm) 6 cm 12/12/24 1236   Wound Depth (cm) 0.1 cm 12/12/24 1236   Wound Surface Area (cm^2) 120 cm^2 12/12/24 1236   Wound Volume (cm^3) 12 cm^3 12/12/24 1236   Calculated Wound Volume (cm^3) 12 cm^3 12/12/24 1236   Drainage Amount Scant 12/12/24 1236   Drainage Description Serosanguineous 12/12/24 1236   Non-staged Wound Description Partial thickness 12/12/24 1236   Treatments Cleansed;Site care 12/12/24 1236   Dressing ABD;Other (Comment) 12/12/24 1236   Dressing Changed Reinforced 12/12/24 1236   Patient Tolerance Tolerated well 12/12/24 1236   Dressing Status Clean;Dry;Intact 12/12/24 1236       Wound 12/11/24 Axilla Right (Active)   Wound Image    12/12/24 1234   Wound Description Pink 12/12/24 1234   Alison-wound Assessment Dry;Intact;Rash 12/12/24 1234   Wound Length (cm) 0.4 cm 12/12/24 1234   Wound Width (cm) 1 cm 12/12/24 1234   Wound Depth (cm) 0.1 cm 12/12/24 1234   Wound Surface Area (cm^2) 0.4 cm^2 12/12/24 1234   Wound Volume (cm^3) 0.04 cm^3 12/12/24 1234   Calculated Wound Volume (cm^3) 0.04 cm^3 12/12/24 1234   Drainage Amount Scant 12/12/24 1234   Drainage Description Serosanguineous 12/12/24 1234   Non-staged Wound Description Partial thickness 12/12/24 1234   Treatments Cleansed;Irrigation with NSS;Site care 12/12/24 1234   Dressing ABD;Other (Comment) 12/12/24 1234   Dressing Changed Reinforced 12/12/24 1234   Patient Tolerance Tolerated well 12/12/24 1234   Dressing Status Clean;Dry;Intact 12/12/24 1234       Wound 12/11/24 Leg Left;Posterior (Active)   Wound Image   12/12/24 1241   Wound Description Dry;Intact 12/12/24 1241   Alison-wound Assessment Dry;Intact;Rash 12/12/24 1241   Wound Length (cm) 0 cm 12/12/24 1241   Wound Width (cm) 0 cm 12/12/24 1241   Wound Depth (cm) 0 cm  12/12/24 1241   Wound Surface Area (cm^2) 0 cm^2 12/12/24 1241   Wound Volume (cm^3) 0 cm^3 12/12/24 1241   Calculated Wound Volume (cm^3) 0 cm^3 12/12/24 1241   Treatments Site care 12/12/24 1241   Dressing ABD;Other (Comment) 12/12/24 1241   Dressing Changed Reinforced 12/12/24 1241   Patient Tolerance Tolerated well 12/12/24 1241   Dressing Status Clean;Dry;Intact 12/12/24 1241       Wound 12/11/24 Leg Right;Lateral (Active)   Wound Image   12/12/24 0900   Wound Description Dry;Intact 12/12/24 1241   Alison-wound Assessment Dry;Intact;Rash 12/12/24 1241   Wound Length (cm) 0 cm 12/12/24 1241   Wound Width (cm) 0 cm 12/12/24 1241   Wound Depth (cm) 0 cm 12/12/24 1241   Wound Surface Area (cm^2) 0 cm^2 12/12/24 1241   Wound Volume (cm^3) 0 cm^3 12/12/24 1241   Calculated Wound Volume (cm^3) 0 cm^3 12/12/24 1241   Treatments Cleansed;Site care 12/12/24 1241   Dressing ABD;Other (Comment) 12/12/24 1241   Dressing Changed Reinforced 12/12/24 1241   Patient Tolerance Tolerated well 12/12/24 1241   Dressing Status Clean;Dry;Intact 12/12/24 1241       Wound 12/11/24 Knee Posterior;Right (Active)   Wound Image   12/12/24 1239   Wound Description Dry;Intact 12/12/24 1239   Alison-wound Assessment Dry;Intact;Rash 12/12/24 1239   Wound Length (cm) 0 cm 12/12/24 1239   Wound Width (cm) 0 cm 12/12/24 1239   Wound Depth (cm) 0 cm 12/12/24 1239   Wound Surface Area (cm^2) 0 cm^2 12/12/24 1239   Wound Volume (cm^3) 0 cm^3 12/12/24 1239   Calculated Wound Volume (cm^3) 0 cm^3 12/12/24 1239   Drainage Amount None 12/12/24 1239   Non-staged Wound Description Not applicable 12/12/24 1239   Treatments Cleansed;Site care 12/12/24 1239   Dressing ABD;Other (Comment) 12/12/24 1239   Dressing Changed Reinforced 12/12/24 1239   Patient Tolerance Tolerated well 12/12/24 1239   Dressing Status Clean;Dry;Intact 12/12/24 1239     Contact through [employer] Secure Chat with any questions  Wound Care will continue to follow while inpatient    Melissa  Jaimie BSN RN [facility]  Wound and Ostomy care

## 2024-12-12 NOTE — ASSESSMENT & PLAN NOTE
Pt with multiple weeping wounds  Pt uses Kenalog and Neosporin for wound care at home  According to documentation from most recent hospitalization, pt is well known to wound care team    Plan:  Will place wound consult  Frequent turning and repositioning

## 2024-12-12 NOTE — ASSESSMENT & PLAN NOTE
A/e/b tachypnea and leukocytosis  Possibly in setting of CHF exacerbation as source unclear     Plan:  Will send septic work-up now  LA negative  Procal negative  UA negative  Urine lytes pending  Flu/COVID/RSV negative  Blood cx pending  Will hold off on abx for now as pt does not appear infectious  Trend wbc and fever curve

## 2024-12-12 NOTE — ASSESSMENT & PLAN NOTE
Pt presented to the ED with worsening sob over the past 3 days. In the ED, pt was placed on 6L NC. When she would have periods of somnolence, her O2 sats would drop into the 50's. Pt was then placed on BiPAP. Of note, pt does not wear O2 at baseline or CPAP/BiPAP at home. On previous admission, pt reported a hx of DAVID, however she also stated she was not interested in a sleep study.  Lab work was significant for mild leukocytosis, elevated BNP. VB.310/70.7/35.2/34.8. Troponins were negative. Pt was given 80 mg IV Lasix for vascular congestion.  () CXR: noted for vascular congestion    Plan:  Flu/COVID/RSV pending  MRSA pending  Wean supplemental O2 to maintain SpO2> 92%  Will check an ABG  PRN diuresis

## 2024-12-12 NOTE — NUTRITION
"   12/12/24 1450   Biochemical Data,Medical Tests, and Procedures   Biochemical Data/Medical Tests/Procedures Lab values reviewed;Meds reviewed   Labs (Comment) 12/12/2024 BUN 33, calcium 8.2   Meds (Comment) Lasix, insulin, levothyroxine, warfarin   Nutrition-Focused Physical Exam   Nutrition-Focused Physical Exam Findings RN skin assessment reviewed;Edema   Nutrition-Focused Physical Exam Findings Nonpitting bilateral lower extremity edema, nonpitting sacral edema, lymphedema, skin integrity per flowsheet documentation.   Medical-Related Concerns heart failure, hypothyroidism, gout, obesity, GERD, type 2 diabetes, anemia   Adequacy of Intake   Nutrition Modality PO   Feeding Route   PO Independent   Current PO Intake   Current Diet Order cardiac, CCD2, 2000 mL fluid restricted diet, thin liquids   Current Meal Intake Adequate   Estimated calorie intake compared to estimated need Anticipate nutrient needs are met/exceeded   PES Statement   Problem Clinical   Weight (3) Overweight/obesity NC-3.3   Overweight/ obesity specific to Obese, Class III (5)   Related to Energy intake>energy output over time   As evidenced by: BMI   Recommendations/Interventions   Malnutrition/BMI Present Yes   Provider already documenting morbid obesity Yes   Adult BMI Classifications Morbid Obesity > 70   Summary Consult-CHF; wound; wound care RN consulted; high BMI.  Presents with SOB. Past medical history significant for heart failure, hypothyroidism, gout, obesity, GERD, type 2 diabetes, anemia.  Food allergies to pork and vitamin C noted.  Weight history reviewed.  Noted significant 44# increase in 1 month.  Nonpitting BL LE edema, nonpitting sacral edema.  Lymphedema noted.  Prescribed a cardiac, CCD2, 2000 mL fluid restricted diet, thin liquids. No documented meal completions as of yet. Patient confirms nutrition history as previously obtained by this RD. \"Usually has 2 meals daily.  States she uses no salt and monitors intake of " "carbs.  Denies dysphagia.  Her home aids cook and grocery shop.  RD discussed diet as prescribed.  Provided and discussed \"my plate for diabetes\" handout.  Advised intake of nonstarchy foods and protein.  Minimizing drinks from calories.  Patient states she already exercises portion control.\" She reports since having cataract surgery she can see her plate better. Discussed importance of protein and intake of nonstarchy vegetables. Patient reports not tolerating some foods (has diarrhea) and she will not try again.  RD discussed diet as prescribed.  She offers no nutrition questions.  RD to follow as needed.   Interventions/Recommendations Continue current diet order;Monitor I & O's   Education Assessment   Education Education initiated/ completed   Education Notes She reports since having cataract surgery she can see her plate better. Discussed importance of protein and intake of nonstarchy vegetables. Patient reports not tolerating some foods (has diarrhea) and she will not try again.   Patient Nutrition Goals   Goal Improve to healthful diet   Goal Status Initiated   Timeframe to complete goal by next f/u   Nutrition Complexity Risk   Nutrition complexity level Low risk   Follow up date 12/19/24       "

## 2024-12-13 ENCOUNTER — TRANSCRIBE ORDERS (OUTPATIENT)
Dept: SLEEP CENTER | Facility: CLINIC | Age: 74
End: 2024-12-13

## 2024-12-13 DIAGNOSIS — E66.2 OBESITY HYPOVENTILATION SYNDROME (HCC): ICD-10-CM

## 2024-12-13 DIAGNOSIS — G47.33 OSA (OBSTRUCTIVE SLEEP APNEA): Primary | ICD-10-CM

## 2024-12-13 PROBLEM — A41.9 SEPSIS (HCC): Status: RESOLVED | Noted: 2024-12-11 | Resolved: 2024-12-13

## 2024-12-13 LAB
ANION GAP SERPL CALCULATED.3IONS-SCNC: 6 MMOL/L (ref 4–13)
BUN SERPL-MCNC: 34 MG/DL (ref 5–25)
CA-I BLD-SCNC: 1 MMOL/L (ref 1.12–1.32)
CALCIUM SERPL-MCNC: 8.3 MG/DL (ref 8.4–10.2)
CHLORIDE SERPL-SCNC: 102 MMOL/L (ref 96–108)
CO2 SERPL-SCNC: 31 MMOL/L (ref 21–32)
CREAT SERPL-MCNC: 1.06 MG/DL (ref 0.6–1.3)
ERYTHROCYTE [DISTWIDTH] IN BLOOD BY AUTOMATED COUNT: 17.2 % (ref 11.6–15.1)
GFR SERPL CREATININE-BSD FRML MDRD: 52 ML/MIN/1.73SQ M
GLUCOSE SERPL-MCNC: 101 MG/DL (ref 65–140)
GLUCOSE SERPL-MCNC: 104 MG/DL (ref 65–140)
GLUCOSE SERPL-MCNC: 117 MG/DL (ref 65–140)
GLUCOSE SERPL-MCNC: 121 MG/DL (ref 65–140)
GLUCOSE SERPL-MCNC: 136 MG/DL (ref 65–140)
HCT VFR BLD AUTO: 32.4 % (ref 34.8–46.1)
HGB BLD-MCNC: 8.7 G/DL (ref 11.5–15.4)
INR PPP: 2.07 (ref 0.85–1.19)
MAGNESIUM SERPL-MCNC: 2 MG/DL (ref 1.9–2.7)
MCH RBC QN AUTO: 24.2 PG (ref 26.8–34.3)
MCHC RBC AUTO-ENTMCNC: 26.9 G/DL (ref 31.4–37.4)
MCV RBC AUTO: 90 FL (ref 82–98)
PHOSPHATE SERPL-MCNC: 3.7 MG/DL (ref 2.3–4.1)
PLATELET # BLD AUTO: 441 THOUSANDS/UL (ref 149–390)
PMV BLD AUTO: 8.5 FL (ref 8.9–12.7)
POTASSIUM SERPL-SCNC: 3.9 MMOL/L (ref 3.5–5.3)
PROTHROMBIN TIME: 23.7 SECONDS (ref 12.3–15)
RBC # BLD AUTO: 3.6 MILLION/UL (ref 3.81–5.12)
SODIUM SERPL-SCNC: 139 MMOL/L (ref 135–147)
WBC # BLD AUTO: 12.07 THOUSAND/UL (ref 4.31–10.16)

## 2024-12-13 PROCEDURE — 85610 PROTHROMBIN TIME: CPT | Performed by: NURSE PRACTITIONER

## 2024-12-13 PROCEDURE — 82330 ASSAY OF CALCIUM: CPT | Performed by: NURSE PRACTITIONER

## 2024-12-13 PROCEDURE — 99233 SBSQ HOSP IP/OBS HIGH 50: CPT | Performed by: INTERNAL MEDICINE

## 2024-12-13 PROCEDURE — 83735 ASSAY OF MAGNESIUM: CPT | Performed by: NURSE PRACTITIONER

## 2024-12-13 PROCEDURE — 80048 BASIC METABOLIC PNL TOTAL CA: CPT | Performed by: NURSE PRACTITIONER

## 2024-12-13 PROCEDURE — 99232 SBSQ HOSP IP/OBS MODERATE 35: CPT | Performed by: INTERNAL MEDICINE

## 2024-12-13 PROCEDURE — 85027 COMPLETE CBC AUTOMATED: CPT | Performed by: NURSE PRACTITIONER

## 2024-12-13 PROCEDURE — 82948 REAGENT STRIP/BLOOD GLUCOSE: CPT

## 2024-12-13 PROCEDURE — 94660 CPAP INITIATION&MGMT: CPT

## 2024-12-13 PROCEDURE — 94760 N-INVAS EAR/PLS OXIMETRY 1: CPT

## 2024-12-13 PROCEDURE — 84100 ASSAY OF PHOSPHORUS: CPT | Performed by: NURSE PRACTITIONER

## 2024-12-13 RX ORDER — WARFARIN SODIUM 2 MG/1
2 TABLET ORAL
Status: DISCONTINUED | OUTPATIENT
Start: 2024-12-13 | End: 2024-12-16 | Stop reason: HOSPADM

## 2024-12-13 RX ADMIN — FUROSEMIDE 60 MG: 20 TABLET ORAL at 16:42

## 2024-12-13 RX ADMIN — PREDNISOLONE ACETATE 1 DROP: 10 SUSPENSION/ DROPS OPHTHALMIC at 21:27

## 2024-12-13 RX ADMIN — GABAPENTIN 200 MG: 100 CAPSULE ORAL at 16:42

## 2024-12-13 RX ADMIN — WARFARIN SODIUM 2 MG: 2 TABLET ORAL at 17:01

## 2024-12-13 RX ADMIN — LEVOTHYROXINE SODIUM 137 MCG: 25 TABLET ORAL at 06:03

## 2024-12-13 RX ADMIN — MIDODRINE HYDROCHLORIDE 2.5 MG: 5 TABLET ORAL at 11:36

## 2024-12-13 RX ADMIN — MIDODRINE HYDROCHLORIDE 2.5 MG: 5 TABLET ORAL at 16:42

## 2024-12-13 RX ADMIN — TOBRAMYCIN 1 DROP: 3 SOLUTION/ DROPS OPHTHALMIC at 21:27

## 2024-12-13 RX ADMIN — TOBRAMYCIN 1 DROP: 3 SOLUTION/ DROPS OPHTHALMIC at 12:06

## 2024-12-13 RX ADMIN — TOBRAMYCIN 1 DROP: 3 SOLUTION/ DROPS OPHTHALMIC at 09:35

## 2024-12-13 RX ADMIN — GABAPENTIN 200 MG: 100 CAPSULE ORAL at 09:31

## 2024-12-13 RX ADMIN — PREDNISOLONE ACETATE 1 DROP: 10 SUSPENSION/ DROPS OPHTHALMIC at 17:01

## 2024-12-13 RX ADMIN — FUROSEMIDE 60 MG: 20 TABLET ORAL at 11:35

## 2024-12-13 RX ADMIN — PREDNISOLONE ACETATE 1 DROP: 10 SUSPENSION/ DROPS OPHTHALMIC at 09:35

## 2024-12-13 RX ADMIN — ALLOPURINOL 100 MG: 100 TABLET ORAL at 09:31

## 2024-12-13 RX ADMIN — PREDNISOLONE ACETATE 1 DROP: 10 SUSPENSION/ DROPS OPHTHALMIC at 12:06

## 2024-12-13 RX ADMIN — TOBRAMYCIN 1 DROP: 3 SOLUTION/ DROPS OPHTHALMIC at 16:43

## 2024-12-13 RX ADMIN — GABAPENTIN 200 MG: 100 CAPSULE ORAL at 21:27

## 2024-12-13 RX ADMIN — MIDODRINE HYDROCHLORIDE 2.5 MG: 5 TABLET ORAL at 09:32

## 2024-12-13 NOTE — NURSING NOTE
"Patient refusing to be repositioned by this nurse and other staff during night shift. Patient stated she is \"comfortable, do not move me.\"  Education provided on importance of repositioning to prevent skin breakdown, and patient stated she \"knew her body.\" See flowsheets for refusal of turns.    Norma OCAMPO RN   "

## 2024-12-13 NOTE — PROGRESS NOTES
Progress Note - Internal Medicine   Name: Zee Kirk 74 y.o. female I MRN: [id number]  Unit/Bed#: ICU 07-01 I Date of Admission: 12/11/2024   Date of Service: 12/13/2024 I Hospital Day: 2     Assessment & Plan  Acute respiratory failure with hypoxia and hypercapnia (HCC)  She was given IV lasix and placed on bipap overnight. Currently on o2 supplement  and nocturnal bipap  Chronic hypercapnia  Pulmonology - spirometry done shows restrictive pattern sec to obese habitus.     Hypothyroid  Cont synthroid  Class 3 severe obesity due to excess calories with serious comorbidity and body mass index (BMI) greater than or equal to 70 in adult (HCC)  Diet control and lifestyle changes counseled  Acute on chronic diastolic congestive heart failure (HCC)  Wt Readings from Last 3 Encounters:   12/13/24 (!) 203 kg (447 lb)   12/05/24 (!) 193 kg (425 lb)   11/10/24 (!) 193 kg (424 lb 12.8 oz)       Depression with anxiety  Stable mood  Impaired mobility  Patient is bed-bound  Cataract, nuclear sclerotic senile, right    Paroxysmal A-fib (HCC)  Patient on coumadin, Coumadin 2 mg M/T/W/F/Sa/Marquez and 4 mg Th   History of kidney stones    Skin abnormalities    Sepsis (HCC) (Resolved: 12/13/2024)      24 Hour Events : no acute events overnight  Subjective : patient seen at bedside. She is alert, awake, denies any sob, or chest pain. She used bipap overnight    Objective :  Temp:  [97.6 °F (36.4 °C)-98.5 °F (36.9 °C)] 98.5 °F (36.9 °C)  HR:  [] 66  BP: ()/(51-56) 106/56  Resp:  [16-26] 20  SpO2:  [93 %-98 %] 97 %  O2 Device: Nasal cannula  Nasal Cannula O2 Flow Rate (L/min):  [2 L/min-3 L/min] 2 L/min    Physical Exam  Skin:     General: Skin is warm and dry.      Comments  HENT:      Head: Normocephalic.      Mouth/Throat:      Mouth: Mucous membranes are dry.   Cardiovascular:      Pulses: Normal pulses.   Musculoskeletal:         General: Normal range of motion.   Abdominal: General: Bowel sounds are normal.       Palpations: Abdomen is soft.   Constitutional:       General: She is awake.      Appearance: morbidly obese, on oxygen via nasal canula     Comments:   Pulmonary:      Breath sounds: diminished bs on the bases, good effort, present.   Psychiatric:         Behavior: Behavior is cooperative.   Neurological:      Mental Status: She is alert.   Genitourinary/Anorectal:       Lab Results: I have reviewed the following results:          VTE Pharmacologic Prophylaxis: Warfarin (Coumadin)  VTE Mechanical Prophylaxis: sequential compression device

## 2024-12-13 NOTE — CASE MANAGEMENT
Case Management Assessment & Discharge Planning Note    Patient name Zee Kirk  Location /-01 MRN [id number]  : 1950 Date 2024       Current Admission Date: 2024  Current Admission Diagnosis:Acute respiratory failure with hypoxia and hypercapnia ([facility])   Patient Active Problem List    Diagnosis Date Noted Date Diagnosed    Paroxysmal A-fib ([facility]) 2024     History of kidney stones 2024     Skin abnormalities 2024     Cataract, nuclear sclerotic senile, right 2024     Acute on chronic heart failure with preserved ejection fraction (HFpEF) ([facility]) 2024     Hypoxic episode 2024     Type 2 diabetes mellitus without complication, without long-term current use of insulin ([facility]) 2024     Long term (current) use of anticoagulants 2024     Subtherapeutic international normalized ratio (INR) 2024     Multiple open wounds 2024     Acute respiratory failure with hypoxia and hypercapnia ([facility]) 2024     Chest discomfort 2024     Left leg pain 2023     Shingles 2023     Cellulitis- Ruled Out 2023     Longstanding persistent atrial fibrillation ([facility]) 2022     History of Clostridioides difficile infection 2022     Hypothyroid 10/03/2020     Mild episode of recurrent major depressive disorder ([facility]) 10/03/2020     Idiopathic chronic 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 10/03/2020     Class 3 severe obesity due to excess calories with serious comorbidity and body mass index (BMI) greater than or equal to 70 in adult ([facility]) 10/03/2020     Acute on chronic diastolic congestive heart failure ([facility]) 10/03/2020     Panniculitis 10/03/2020     Gastroesophageal reflux disease without esophagitis 10/03/2020     Hx MRSA infection 2020     Impaired mobility 2019     Osteoarthritis of glenohumeral joint 2019     Left ovarian cyst 2017     Depression with anxiety 07/15/2017      Anemia 12/28/2016     Adjustment disorder 09/05/2013     Irritable bowel syndrome 12/04/2012     Left atrial enlargement 12/04/2012     Left ventricular hypertrophy 12/04/2012     Carpal tunnel syndrome 05/30/2012     Gout 05/30/2012     Hyperlipidemia 05/30/2012     Symptomatic menopausal or female climacteric states 05/30/2012       LOS (days): 2  Geometric Mean LOS (GMLOS) (days): 3.9  Days to GMLOS:2.3     OBJECTIVE:    Risk of Unplanned Readmission Score: 32.8     Current admission status: Inpatient    Preferred Pharmacy:   [facility] - [place] - [address]  [address]  [place] [address]  Phone: [phone] Fax: [phone]    Primary Care Provider: Franklyn Caruso MD    Primary Insurance: MEDICARE  Secondary Insurance: AAR    ASSESSMENT:  Active Health Care Proxies       MamadouRobbie pavon [facility] Health Care Representative - Nephew   Primary Phone: [phone] (Mobile)                 Readmission Root Cause  30 Day Readmission: No    Patient Information  Admitted from:: Home  Mental Status: Alert  During Assessment patient was accompanied by: Not accompanied during assessment  Assessment information provided by:: Patient  Primary Caregiver: Other (Comment)  Caregiver's Name:: Byron BASHIR 8am-8pm  Caregiver's Relationship to Patient:: Other (Specify)  Caregiver's Telephone Number:: Fay Archuleta () [phone]  Support Systems: Home care staff  County of Residence: [facility] city do you live in?: [place]  Home entry access options. Select all that apply.: Ramp  Type of Current Residence: 2 story home  Upon entering residence, is there a bedroom on the main floor (no further steps)?: Yes  Upon entering residence, is there a bathroom on the main floor (no further steps)?: Yes  Living Arrangements: Lives Alone  Is patient a ?: No    Activities of Daily Living Prior to Admission  Functional Status: Total dependent  Completes ADLs independently?: No  Level of  ADL dependence: Total Dependent  Ambulates independently?: No  Level of ambulatory dependence: Total Dependent  Does patient use assisted devices?: Yes  Assisted Devices (DME) used: Hospital Bed, Other (Comment), Bedside Commode (Bedpan)  Does patient currently own DME?: Yes  What DME does the patient currently own?: Hospital Bed, Walker, Wheelchair, Other (Comment) ([place]ide)  Does patient have a history of Outpatient Therapy (PT/OT)?: No  Does the patient have a history of Short-Term Rehab?: Yes ([facility], [facility], [place])  Does patient have a history of HHC?: Yes ([facility], [place])  Does patient currently have HHC?: No    Patient Information Continued  Income Source: Pension/group home  Does patient have prescription coverage?: Yes  Does patient receive dialysis treatments?: No  Does patient have a history of Mental Health Diagnosis?: No    Means of Transportation  Means of Transport to Appts:: Other (Comment) ([place])    DISCHARGE DETAILS:    Discharge planning discussed with:: Pt     Contacts  Patient Contacts: Robbie Richmond and Byron BASHIR  Relationship to Patient:: Other (Comment)  Contact Method: Phone  Phone Number: [phone] nephew, ( Bailee  [phone])  Reason/Outcome: Discharge Planning  Pt is bedbound at baseline.  Does not wear oxygen at home.  Pulmonology has ordered home NIV for HS.   Script and  documentation has already been sent to Sage Mejia at [facility]. Pt did not need ABG''s.  Pt has CG through [employer] ( [phone]).  Has CG from 8am-8pm.  CG Fay Archuleta ( [phone]).  Pt has already contacted CG notifying her she will most likely be DC on Monday.  Will need a desat study if still requiring oxygen upon DC.  Sent an email to Sage at [facility] to determine if they have all the required documentation for NIV.  Will follow for care needs.

## 2024-12-13 NOTE — PHYSICAL THERAPY NOTE
PT screen       12/13/24 0959   PT Last Visit   PT Visit Date 12/13/24   Note Type   Note type Screen   Additional Comments PT referral received. Completed PT screen. Pt is currently functioning at baseline level with no acute PT needs at this time. Spoke with pt, pt bed bound since 2020 and has caregiver at home 12 hrs per day. Will d/c pt at this time and reassess if medically needed.     Hanna Manning

## 2024-12-13 NOTE — SPEECH THERAPY NOTE
Speech Language/Pathology  Consult received.  Records reviewed. Pt admitted with acute respiratory failure due to hypoxia, severe obesity BMI 80 . On 2 liters NC. passed RN Dysphagia Assessment.  Communication deficits denied.Upon ST arrival pt completed 100% of breakfast omelet, muffin, and thin liquids. Denied difficulties with chewing and swallowing. Reported sometimes feels food stuck in chest however reported hx  hiatal hernia and hx of GERD. ST reviewed safe swallow strategies including slow rate, alternate liquids with solids, and remain upright during/after a meal. Pt understood.  No additional inpatient Speech Pathology evaluation appears indicated at this time.  Please re-consult if additional concerns arise. Thank you.

## 2024-12-13 NOTE — OCCUPATIONAL THERAPY NOTE
Occupational Therapy Screen       12/13/24 1001   OT Last Visit   OT Visit Date 12/13/24   Note Type   Note type Screen   Additional Comments OT referral received. Completed OT screen. Pt is currently functioning at baseline level with no acute OT needs at this time. Spoke with pt, pt bed bound since 2020 and has caregiver at home 12 hrs per day. Will d/c pt at this time and reassess if medically needed.     Monse Neves, OT          Patient Name: Zee Kirk  Today's Date: 12/13/2024

## 2024-12-13 NOTE — ASSESSMENT & PLAN NOTE
Patient qualifies for BiPAP based on DAVID/OHS with severe restrictive thoracic disorder.  I have discussed with Sage Mejia with [facility] and  to arrange BiPAP to be delivered before discharge on Monday  Continue nocturnal BiPAP while hospitalized  I messaged the pulmonary office to arrange follow-up in 2 months or so to assess compliance and treatment response  I also still think the patient needs a split night sleep study to assure proper titration of BiPAP so this will be arranged as an outpatient and I ordered the study and have sent a message to outpatient staff.    Severe restrictive thoracic disorder secondary to morbid obesity. Pt has obesity hypoventilation syndrome with low lung volumes and hypercapnia - will need NIV in the home - Pt used BiPAP in the hospital and remains hypercapnic. The patient is at high risk for admission without NIV therapy. Probable overlap syndrome as well that may not be treated by pressures on conventional bipaps. Severe restrictive thoracic disease secondary to morbid obesity as demonstrated by PFT with FVC of 1.18 L (35% predicted) and FEV1 of 0.80 (30% predicted). The FEV1/FVC ratio is 0.68 (above the Upper limit of normal).  This PFT is completely suggestive of restriction and no consideration of obstruction.  The flow-volume loop is consistent with restriction

## 2024-12-13 NOTE — ASSESSMENT & PLAN NOTE
Wt Readings from Last 3 Encounters:   12/13/24 (!) 203 kg (447 lb)   12/05/24 (!) 193 kg (425 lb)   11/10/24 (!) 193 kg (424 lb 12.8 oz)

## 2024-12-13 NOTE — PLAN OF CARE
Problem: Potential for Falls  Goal: Patient will remain free of falls  Description: INTERVENTIONS:  - Educate patient/family on patient safety including physical limitations  - Instruct patient to call for assistance with activity   - Consult OT/PT to assist with strengthening/mobility   - Keep Call bell within reach  - Keep bed low and locked with side rails adjusted as appropriate  - Keep care items and personal belongings within reach  - Initiate and maintain comfort rounds  - Make Fall Risk Sign visible to staff  - Offer Toileting every 2 Hours, in advance of need  - Initiate/Maintain bed alarm  - Obtain necessary fall risk management equipment:   - Apply yellow socks and bracelet for high fall risk patients  - Consider moving patient to room near nurses station  Outcome: Progressing     Problem: PAIN - ADULT  Goal: Verbalizes/displays adequate comfort level or baseline comfort level  Description: Interventions:  - Encourage patient to monitor pain and request assistance  - Assess pain using appropriate pain scale  - Administer analgesics based on type and severity of pain and evaluate response  - Implement non-pharmacological measures as appropriate and evaluate response  - Consider cultural and social influences on pain and pain management  - Notify physician/advanced practitioner if interventions unsuccessful or patient reports new pain  Outcome: Progressing     Problem: INFECTION - ADULT  Goal: Absence or prevention of progression during hospitalization  Description: INTERVENTIONS:  - Assess and monitor for signs and symptoms of infection  - Monitor lab/diagnostic results  - Monitor all insertion sites, i.e. indwelling lines, tubes, and drains  - Monitor endotracheal if appropriate and nasal secretions for changes in amount and color  - [place] appropriate cooling/warming therapies per order  - Administer medications as ordered  - Instruct and encourage patient and family to use good hand hygiene  technique  - Identify and instruct in appropriate isolation precautions for identified infection/condition  Outcome: Progressing  Goal: Absence of fever/infection during neutropenic period  Description: INTERVENTIONS:  - Monitor WBC    Outcome: Progressing     Problem: SAFETY ADULT  Goal: Patient will remain free of falls  Description: INTERVENTIONS:  - Educate patient/family on patient safety including physical limitations  - Instruct patient to call for assistance with activity   - Consult OT/PT to assist with strengthening/mobility   - Keep Call bell within reach  - Keep bed low and locked with side rails adjusted as appropriate  - Keep care items and personal belongings within reach  - Initiate and maintain comfort rounds  - Make Fall Risk Sign visible to staff  - Offer Toileting every 2 Hours, in advance of need  - Initiate/Maintain bed alarm  - Obtain necessary fall risk management equipment:   - Apply yellow socks and bracelet for high fall risk patients  - Consider moving patient to room near nurses station  Outcome: Progressing  Goal: Maintain or return to baseline ADL function  Description: INTERVENTIONS:  -  Assess patient's ability to carry out ADLs; assess patient's baseline for ADL function and identify physical deficits which impact ability to perform ADLs (bathing, care of mouth/teeth, toileting, grooming, dressing, etc.)  - Assess/evaluate cause of self-care deficits   - Assess range of motion  - Assess patient's mobility; develop plan if impaired  - Assess patient's need for assistive devices and provide as appropriate  - Encourage maximum independence but intervene and supervise when necessary  - Involve family in performance of ADLs  - Assess for home care needs following discharge   - Consider OT consult to assist with ADL evaluation and planning for discharge  - Provide patient education as appropriate  Outcome: Progressing  Goal: Maintains/Returns to pre admission functional  level  Description: INTERVENTIONS:  - Perform AM-PAC 6 Click Basic Mobility/ Daily Activity assessment daily.  - Set and communicate daily mobility goal to care team and patient/family/caregiver.   - Collaborate with rehabilitation services on mobility goals if consulted  - Perform Range of Motion 3 times a day.  - Reposition patient every 2 hours.  - Record patient progress and toleration of activity level   Outcome: Progressing     Problem: DISCHARGE PLANNING  Goal: Discharge to home or other facility with appropriate resources  Description: INTERVENTIONS:  - Identify barriers to discharge w/patient and caregiver  - Arrange for needed discharge resources and transportation as appropriate  - Identify discharge learning needs (meds, wound care, etc.)  - Arrange for interpretive services to assist at discharge as needed  - Refer to Case Management Department for coordinating discharge planning if the patient needs post-hospital services based on physician/advanced practitioner order or complex needs related to functional status, cognitive ability, or social support system  Outcome: Progressing     Problem: Knowledge Deficit  Goal: Patient/family/caregiver demonstrates understanding of disease process, treatment plan, medications, and discharge instructions  Description: Complete learning assessment and assess knowledge base.  Interventions:  - Provide teaching at level of understanding  - Provide teaching via preferred learning methods  Outcome: Progressing     Problem: NEUROSENSORY - ADULT  Goal: Achieves stable or improved neurological status  Description: INTERVENTIONS  - Monitor and report changes in neurological status  - Monitor vital signs such as temperature, blood pressure, glucose, and any other labs ordered   - Initiate measures to prevent increased intracranial pressure  - Monitor for seizure activity and implement precautions if appropriate      Outcome: Progressing  Goal: Remains free of injury related  to seizures activity  Description: INTERVENTIONS  - Maintain airway, patient safety  and administer oxygen as ordered  - Monitor patient for seizure activity, document and report duration and description of seizure to physician/advanced practitioner  - If seizure occurs,  ensure patient safety during seizure  - Reorient patient post seizure  - Seizure pads on all 4 side rails  - Instruct patient/family to notify RN of any seizure activity including if an aura is experienced  - Instruct patient/family to call for assistance with activity based on nursing assessment  - Administer anti-seizure medications if ordered    Outcome: Progressing  Goal: Achieves maximal functionality and self care  Description: INTERVENTIONS  - Monitor swallowing and airway patency with patient fatigue and changes in neurological status  - Encourage and assist patient to increase activity and self care.   - Encourage visually impaired, hearing impaired and aphasic patients to use assistive/communication devices  Outcome: Progressing     Problem: CARDIOVASCULAR - ADULT  Goal: Maintains optimal cardiac output and hemodynamic stability  Description: INTERVENTIONS:  - Monitor I/O, vital signs and rhythm  - Monitor for S/S and trends of decreased cardiac output  - Administer and titrate ordered vasoactive medications to optimize hemodynamic stability  - Assess quality of pulses, skin color and temperature  - Assess for signs of decreased coronary artery perfusion  - Instruct patient to report change in severity of symptoms  Outcome: Progressing  Goal: Absence of cardiac dysrhythmias or at baseline rhythm  Description: INTERVENTIONS:  - Continuous cardiac monitoring, vital signs, obtain 12 lead EKG if ordered  - Administer antiarrhythmic and heart rate control medications as ordered  - Monitor electrolytes and administer replacement therapy as ordered  Outcome: Progressing     Problem: RESPIRATORY - ADULT  Goal: Achieves optimal ventilation and  oxygenation  Description: INTERVENTIONS:  - Assess for changes in respiratory status  - Assess for changes in mentation and behavior  - Position to facilitate oxygenation and minimize respiratory effort  - Oxygen administered by appropriate delivery if ordered  - Initiate smoking cessation education as indicated  - Encourage broncho-pulmonary hygiene including cough, deep breathe, Incentive Spirometry  - Assess the need for suctioning and aspirate as needed  - Assess and instruct to report SOB or any respiratory difficulty  - Respiratory Therapy support as indicated  Outcome: Progressing     Problem: GENITOURINARY - ADULT  Goal: Absence of urinary retention  Description: INTERVENTIONS:  - Assess patient’s ability to void and empty bladder  - Monitor I/O  - Bladder scan as needed  - Discuss with physician/AP medications to alleviate retention as needed  - Discuss catheterization for long term situations as appropriate  Outcome: Progressing     Problem: METABOLIC, FLUID AND ELECTROLYTES - ADULT  Goal: Electrolytes maintained within normal limits  Description: INTERVENTIONS:  - Monitor labs and assess patient for signs and symptoms of electrolyte imbalances  - Administer electrolyte replacement as ordered  - Monitor response to electrolyte replacements, including repeat lab results as appropriate  - Instruct patient on fluid and nutrition as appropriate  Outcome: Progressing  Goal: Fluid balance maintained  Description: INTERVENTIONS:  - Monitor labs   - Monitor I/O and WT  - Instruct patient on fluid and nutrition as appropriate  - Assess for signs & symptoms of volume excess or deficit  Outcome: Progressing  Goal: Glucose maintained within target range  Description: INTERVENTIONS:  - Monitor Blood Glucose as ordered  - Assess for signs and symptoms of hyperglycemia and hypoglycemia  - Administer ordered medications to maintain glucose within target range  - Assess nutritional intake and initiate nutrition service  referral as needed  Outcome: Progressing     Problem: Nutrition/Hydration-ADULT  Goal: Nutrient/Hydration intake appropriate for improving, restoring or maintaining nutritional needs  Description: Monitor and assess patient's nutrition/hydration status for malnutrition. Collaborate with interdisciplinary team and initiate plan and interventions as ordered.  Monitor patient's weight and dietary intake as ordered or per policy. Utilize nutrition screening tool and intervene as necessary. Determine patient's food preferences and provide high-protein, high-caloric foods as appropriate.     INTERVENTIONS:  - Monitor oral intake, urinary output, labs, and treatment plans  - Assess nutrition and hydration status and recommend course of action  - Evaluate amount of meals eaten  - Assist patient with eating if necessary   - Allow adequate time for meals  - Recommend/ encourage appropriate diets, oral nutritional supplements, and vitamin/mineral supplements  - Order, calculate, and assess calorie counts as needed  - Recommend, monitor, and adjust tube feedings and TPN/PPN based on assessed needs  - Assess need for intravenous fluids  - Provide specific nutrition/hydration education as appropriate  - Include patient/family/caregiver in decisions related to nutrition  Outcome: Progressing     Problem: Prexisting or High Potential for Compromised Skin Integrity  Goal: Skin integrity is maintained or improved  Description: INTERVENTIONS:  - Identify patients at risk for skin breakdown  - Assess and monitor skin integrity  - Assess and monitor nutrition and hydration status  - Monitor labs   - Assess for incontinence   - Turn and reposition patient  - Assist with mobility/ambulation  - Relieve pressure over bony prominences  - Avoid friction and shearing  - Provide appropriate hygiene as needed including keeping skin clean and dry  - Evaluate need for skin moisturizer/barrier cream  - Collaborate with interdisciplinary team   -  Patient/family teaching  - Consider wound care consult   Outcome: Progressing

## 2024-12-13 NOTE — PROGRESS NOTES
Progress Note - Pulmonology   Name: Zee Kirk 74 y.o. female I MRN: [id number]  Unit/Bed#: -[facility] I Date of Admission: 12/11/2024   Date of Service: 12/13/2024 I Hospital Day: 2    Assessment & Plan  Acute respiratory failure with hypoxia and hypercapnia (HCC)  Patient qualifies for BiPAP based on DAVID/OHS with severe restrictive thoracic disorder.  I have discussed with Sage Mejia with [facility] and  to arrange BiPAP to be delivered before discharge on Monday  Continue nocturnal BiPAP while hospitalized  I messaged the pulmonary office to arrange follow-up in 2 months or so to assess compliance and treatment response  I also still think the patient needs a split night sleep study to assure proper titration of BiPAP so this will be arranged as an outpatient and I ordered the study and have sent a message to outpatient staff.    Severe restrictive thoracic disorder secondary to morbid obesity. Pt has obesity hypoventilation syndrome with low lung volumes and hypercapnia - will need NIV in the home - Pt used BiPAP in the hospital and remains hypercapnic. The patient is at high risk for admission without NIV therapy. Probable overlap syndrome as well that may not be treated by pressures on conventional bipaps. Severe restrictive thoracic disease secondary to morbid obesity as demonstrated by PFT with FVC of 1.18 L (35% predicted) and FEV1 of 0.80 (30% predicted). The FEV1/FVC ratio is 0.68 (above the Upper limit of normal).  This PFT is completely suggestive of restriction and no consideration of obstruction.  The flow-volume loop is consistent with restriction   Class 3 severe obesity due to excess calories with serious comorbidity and body mass index (BMI) greater than or equal to 70 in adult (HCC)    Acute on chronic diastolic congestive heart failure (HCC)  Wt Readings from Last 3 Encounters:   12/13/24 (!) 203 kg (447 lb)   12/05/24 (!) 193 kg (425 lb)   11/10/24 (!) 193 kg (424 lb 12.8 oz)      Defer management to primary team re diuretics      24 Hour Events : No acute events  Subjective : Tolerated BiPAP. Feels better    Objective :  Temp:  [97.8 °F (36.6 °C)-98.5 °F (36.9 °C)] 98 °F (36.7 °C)  HR:  [60-89] 89  BP: (102-120)/(51-61) 113/61  Resp:  [18-20] 18  SpO2:  [93 %-98 %] 93 %  O2 Device: Nasal cannula  Nasal Cannula O2 Flow Rate (L/min):  [2 L/min] 2 L/min    Physical Exam  Vitals and nursing note reviewed.   Constitutional:       General: She is not in acute distress.     Appearance: She is well-developed. She is obese.   HENT:      Head: Normocephalic and atraumatic.   Eyes:      Conjunctiva/sclera: Conjunctivae normal.   Cardiovascular:      Rate and Rhythm: Normal rate and regular rhythm.      Heart sounds: No murmur heard.  Pulmonary:      Effort: Pulmonary effort is normal. No respiratory distress.      Breath sounds: Decreased breath sounds present.   Abdominal:      Palpations: Abdomen is soft.      Tenderness: There is no abdominal tenderness.   Musculoskeletal:         General: No swelling.      Cervical back: Neck supple.      Right lower leg: Edema present.      Left lower leg: Edema present.   Skin:     General: Skin is warm and dry.      Capillary Refill: Capillary refill takes less than 2 seconds.   Neurological:      Mental Status: She is alert.   Psychiatric:         Mood and Affect: Mood normal.           Lab Results: I have reviewed the following results:   .     12/13/24  0558 12/13/24  0714   WBC 12.07*  --    HGB 8.7*  --    HCT 32.4*  --    *  --    SODIUM 139  --    K 3.9  --      --    CO2 31  --    BUN 34*  --    CREATININE 1.06  --    GLUC 104  --    CAIONIZED 1.00*  --    MG 2.0  --    PHOS 3.7  --    INR  --  2.07*     ABG: No new results in last 24 hours.    Imaging Results Review: I personally reviewed the following image studies in PACS and associated radiology reports: chest xray. My interpretation of the radiology images/reports is: Clear lungs.  No acute findings. Limited study.  Other Study Results Review: EKG was reviewed.   PFT Results Reviewed: reviewed

## 2024-12-13 NOTE — PLAN OF CARE
Problem: Potential for Falls  Goal: Patient will remain free of falls  Description: INTERVENTIONS:  - Educate patient/family on patient safety including physical limitations  - Instruct patient to call for assistance with activity   - Consult OT/PT to assist with strengthening/mobility   - Keep Call bell within reach  - Keep bed low and locked with side rails adjusted as appropriate  - Keep care items and personal belongings within reach  - Initiate and maintain comfort rounds  - Make Fall Risk Sign visible to staff  - Offer Toileting every 2 Hours, in advance of need  - Initiate/Maintain bed alarm  - Obtain necessary fall risk management equipment:   - Apply yellow socks and bracelet for high fall risk patients  - Consider moving patient to room near nurses station  Outcome: Progressing     Problem: PAIN - ADULT  Goal: Verbalizes/displays adequate comfort level or baseline comfort level  Description: Interventions:  - Encourage patient to monitor pain and request assistance  - Assess pain using appropriate pain scale  - Administer analgesics based on type and severity of pain and evaluate response  - Implement non-pharmacological measures as appropriate and evaluate response  - Consider cultural and social influences on pain and pain management  - Notify physician/advanced practitioner if interventions unsuccessful or patient reports new pain  Outcome: Progressing     Problem: INFECTION - ADULT  Goal: Absence or prevention of progression during hospitalization  Description: INTERVENTIONS:  - Assess and monitor for signs and symptoms of infection  - Monitor lab/diagnostic results  - Monitor all insertion sites, i.e. indwelling lines, tubes, and drains  - Monitor endotracheal if appropriate and nasal secretions for changes in amount and color  - [place] appropriate cooling/warming therapies per order  - Administer medications as ordered  - Instruct and encourage patient and family to use good hand hygiene  technique  - Identify and instruct in appropriate isolation precautions for identified infection/condition  Outcome: Progressing     Problem: SAFETY ADULT  Goal: Patient will remain free of falls  Description: INTERVENTIONS:  - Educate patient/family on patient safety including physical limitations  - Instruct patient to call for assistance with activity   - Consult OT/PT to assist with strengthening/mobility   - Keep Call bell within reach  - Keep bed low and locked with side rails adjusted as appropriate  - Keep care items and personal belongings within reach  - Initiate and maintain comfort rounds  - Make Fall Risk Sign visible to staff  - Offer Toileting every 2 Hours, in advance of need  - Initiate/Maintain bed alarm  - Obtain necessary fall risk management equipment:   - Apply yellow socks and bracelet for high fall risk patients  - Consider moving patient to room near nurses station  Outcome: Progressing     Problem: DISCHARGE PLANNING  Goal: Discharge to home or other facility with appropriate resources  Description: INTERVENTIONS:  - Identify barriers to discharge w/patient and caregiver  - Arrange for needed discharge resources and transportation as appropriate  - Identify discharge learning needs (meds, wound care, etc.)  - Arrange for interpretive services to assist at discharge as needed  - Refer to Case Management Department for coordinating discharge planning if the patient needs post-hospital services based on physician/advanced practitioner order or complex needs related to functional status, cognitive ability, or social support system  Outcome: Progressing     Problem: Knowledge Deficit  Goal: Patient/family/caregiver demonstrates understanding of disease process, treatment plan, medications, and discharge instructions  Description: Complete learning assessment and assess knowledge base.  Interventions:  - Provide teaching at level of understanding  - Provide teaching via preferred learning  methods  Outcome: Progressing     Problem: NEUROSENSORY - ADULT  Goal: Achieves stable or improved neurological status  Description: INTERVENTIONS  - Monitor and report changes in neurological status  - Monitor vital signs such as temperature, blood pressure, glucose, and any other labs ordered   - Initiate measures to prevent increased intracranial pressure  - Monitor for seizure activity and implement precautions if appropriate      Outcome: Progressing     Problem: RESPIRATORY - ADULT  Goal: Achieves optimal ventilation and oxygenation  Description: INTERVENTIONS:  - Assess for changes in respiratory status  - Assess for changes in mentation and behavior  - Position to facilitate oxygenation and minimize respiratory effort  - Oxygen administered by appropriate delivery if ordered  - Initiate smoking cessation education as indicated  - Encourage broncho-pulmonary hygiene including cough, deep breathe, Incentive Spirometry  - Assess the need for suctioning and aspirate as needed  - Assess and instruct to report SOB or any respiratory difficulty  - Respiratory Therapy support as indicated  Outcome: Progressing     Problem: GENITOURINARY - ADULT  Goal: Absence of urinary retention  Description: INTERVENTIONS:  - Assess patient’s ability to void and empty bladder  - Monitor I/O  - Bladder scan as needed  - Discuss with physician/AP medications to alleviate retention as needed  - Discuss catheterization for long term situations as appropriate  Outcome: Progressing     Problem: METABOLIC, FLUID AND ELECTROLYTES - ADULT  Goal: Fluid balance maintained  Description: INTERVENTIONS:  - Monitor labs   - Monitor I/O and WT  - Instruct patient on fluid and nutrition as appropriate  - Assess for signs & symptoms of volume excess or deficit  Outcome: Progressing     Problem: Nutrition/Hydration-ADULT  Goal: Nutrient/Hydration intake appropriate for improving, restoring or maintaining nutritional needs  Description: Monitor and  assess patient's nutrition/hydration status for malnutrition. Collaborate with interdisciplinary team and initiate plan and interventions as ordered.  Monitor patient's weight and dietary intake as ordered or per policy. Utilize nutrition screening tool and intervene as necessary. Determine patient's food preferences and provide high-protein, high-caloric foods as appropriate.     INTERVENTIONS:  - Monitor oral intake, urinary output, labs, and treatment plans  - Assess nutrition and hydration status and recommend course of action  - Evaluate amount of meals eaten  - Assist patient with eating if necessary   - Allow adequate time for meals  - Recommend/ encourage appropriate diets, oral nutritional supplements, and vitamin/mineral supplements  - Order, calculate, and assess calorie counts as needed  - Recommend, monitor, and adjust tube feedings and TPN/PPN based on assessed needs  - Assess need for intravenous fluids  - Provide specific nutrition/hydration education as appropriate  - Include patient/family/caregiver in decisions related to nutrition  Outcome: Progressing     Problem: Prexisting or High Potential for Compromised Skin Integrity  Goal: Skin integrity is maintained or improved  Description: INTERVENTIONS:  - Identify patients at risk for skin breakdown  - Assess and monitor skin integrity  - Assess and monitor nutrition and hydration status  - Monitor labs   - Assess for incontinence   - Turn and reposition patient  - Assist with mobility/ambulation  - Relieve pressure over bony prominences  - Avoid friction and shearing  - Provide appropriate hygiene as needed including keeping skin clean and dry  - Evaluate need for skin moisturizer/barrier cream  - Collaborate with interdisciplinary team   - Patient/family teaching  - Consider wound care consult   Outcome: Progressing

## 2024-12-13 NOTE — ASSESSMENT & PLAN NOTE
She was given IV lasix and placed on bipap overnight. Currently on o2 supplement  and nocturnal bipap  Chronic hypercapnia  Pulmonology - spirometry done shows restrictive pattern sec to obese habitus.

## 2024-12-13 NOTE — ASSESSMENT & PLAN NOTE
Wt Readings from Last 3 Encounters:   12/13/24 (!) 203 kg (447 lb)   12/05/24 (!) 193 kg (425 lb)   11/10/24 (!) 193 kg (424 lb 12.8 oz)     Defer management to primary team re diuretics

## 2024-12-14 LAB
ANION GAP SERPL CALCULATED.3IONS-SCNC: 5 MMOL/L (ref 4–13)
BASOPHILS # BLD AUTO: 0.05 THOUSANDS/ÂΜL (ref 0–0.1)
BASOPHILS NFR BLD AUTO: 1 % (ref 0–1)
BUN SERPL-MCNC: 33 MG/DL (ref 5–25)
CALCIUM SERPL-MCNC: 8.4 MG/DL (ref 8.4–10.2)
CHLORIDE SERPL-SCNC: 100 MMOL/L (ref 96–108)
CO2 SERPL-SCNC: 34 MMOL/L (ref 21–32)
CREAT SERPL-MCNC: 1.14 MG/DL (ref 0.6–1.3)
EOSINOPHIL # BLD AUTO: 0.33 THOUSAND/ÂΜL (ref 0–0.61)
EOSINOPHIL NFR BLD AUTO: 3 % (ref 0–6)
ERYTHROCYTE [DISTWIDTH] IN BLOOD BY AUTOMATED COUNT: 17.2 % (ref 11.6–15.1)
GFR SERPL CREATININE-BSD FRML MDRD: 47 ML/MIN/1.73SQ M
GLUCOSE SERPL-MCNC: 100 MG/DL (ref 65–140)
GLUCOSE SERPL-MCNC: 106 MG/DL (ref 65–140)
GLUCOSE SERPL-MCNC: 121 MG/DL (ref 65–140)
GLUCOSE SERPL-MCNC: 132 MG/DL (ref 65–140)
GLUCOSE SERPL-MCNC: 83 MG/DL (ref 65–140)
HCT VFR BLD AUTO: 32 % (ref 34.8–46.1)
HGB BLD-MCNC: 8.9 G/DL (ref 11.5–15.4)
IMM GRANULOCYTES # BLD AUTO: 0.05 THOUSAND/UL (ref 0–0.2)
IMM GRANULOCYTES NFR BLD AUTO: 1 % (ref 0–2)
LYMPHOCYTES # BLD AUTO: 1.11 THOUSANDS/ÂΜL (ref 0.6–4.47)
LYMPHOCYTES NFR BLD AUTO: 10 % (ref 14–44)
MCH RBC QN AUTO: 24.7 PG (ref 26.8–34.3)
MCHC RBC AUTO-ENTMCNC: 27.8 G/DL (ref 31.4–37.4)
MCV RBC AUTO: 89 FL (ref 82–98)
MONOCYTES # BLD AUTO: 0.6 THOUSAND/ÂΜL (ref 0.17–1.22)
MONOCYTES NFR BLD AUTO: 6 % (ref 4–12)
NEUTROPHILS # BLD AUTO: 8.73 THOUSANDS/ÂΜL (ref 1.85–7.62)
NEUTS SEG NFR BLD AUTO: 79 % (ref 43–75)
NRBC BLD AUTO-RTO: 0 /100 WBCS
PLATELET # BLD AUTO: 398 THOUSANDS/UL (ref 149–390)
PMV BLD AUTO: 8.2 FL (ref 8.9–12.7)
POTASSIUM SERPL-SCNC: 3.7 MMOL/L (ref 3.5–5.3)
RBC # BLD AUTO: 3.6 MILLION/UL (ref 3.81–5.12)
SODIUM SERPL-SCNC: 139 MMOL/L (ref 135–147)
WBC # BLD AUTO: 10.87 THOUSAND/UL (ref 4.31–10.16)

## 2024-12-14 PROCEDURE — 94660 CPAP INITIATION&MGMT: CPT

## 2024-12-14 PROCEDURE — 80048 BASIC METABOLIC PNL TOTAL CA: CPT | Performed by: INTERNAL MEDICINE

## 2024-12-14 PROCEDURE — 82948 REAGENT STRIP/BLOOD GLUCOSE: CPT

## 2024-12-14 PROCEDURE — 99232 SBSQ HOSP IP/OBS MODERATE 35: CPT | Performed by: INTERNAL MEDICINE

## 2024-12-14 PROCEDURE — 94760 N-INVAS EAR/PLS OXIMETRY 1: CPT

## 2024-12-14 PROCEDURE — 85025 COMPLETE CBC W/AUTO DIFF WBC: CPT | Performed by: INTERNAL MEDICINE

## 2024-12-14 RX ADMIN — PREDNISOLONE ACETATE 1 DROP: 10 SUSPENSION/ DROPS OPHTHALMIC at 22:11

## 2024-12-14 RX ADMIN — TRIAMCINOLONE ACETONIDE: 5 CREAM TOPICAL at 09:14

## 2024-12-14 RX ADMIN — PREDNISOLONE ACETATE 1 DROP: 10 SUSPENSION/ DROPS OPHTHALMIC at 09:02

## 2024-12-14 RX ADMIN — GABAPENTIN 200 MG: 100 CAPSULE ORAL at 09:02

## 2024-12-14 RX ADMIN — WARFARIN SODIUM 2 MG: 2 TABLET ORAL at 17:08

## 2024-12-14 RX ADMIN — PREDNISOLONE ACETATE 1 DROP: 10 SUSPENSION/ DROPS OPHTHALMIC at 12:48

## 2024-12-14 RX ADMIN — ALLOPURINOL 100 MG: 100 TABLET ORAL at 09:02

## 2024-12-14 RX ADMIN — PREDNISOLONE ACETATE 1 DROP: 10 SUSPENSION/ DROPS OPHTHALMIC at 17:08

## 2024-12-14 RX ADMIN — MICONAZOLE NITRATE: 20 CREAM TOPICAL at 09:14

## 2024-12-14 RX ADMIN — MIDODRINE HYDROCHLORIDE 2.5 MG: 5 TABLET ORAL at 12:47

## 2024-12-14 RX ADMIN — TRIAMCINOLONE ACETONIDE: 5 CREAM TOPICAL at 17:16

## 2024-12-14 RX ADMIN — GABAPENTIN 200 MG: 100 CAPSULE ORAL at 17:08

## 2024-12-14 RX ADMIN — FUROSEMIDE 60 MG: 20 TABLET ORAL at 17:08

## 2024-12-14 RX ADMIN — MICONAZOLE NITRATE: 20 CREAM TOPICAL at 17:08

## 2024-12-14 RX ADMIN — MIDODRINE HYDROCHLORIDE 2.5 MG: 5 TABLET ORAL at 06:01

## 2024-12-14 RX ADMIN — TOBRAMYCIN 1 DROP: 3 SOLUTION/ DROPS OPHTHALMIC at 22:11

## 2024-12-14 RX ADMIN — TOBRAMYCIN 1 DROP: 3 SOLUTION/ DROPS OPHTHALMIC at 17:08

## 2024-12-14 RX ADMIN — GABAPENTIN 200 MG: 100 CAPSULE ORAL at 22:09

## 2024-12-14 RX ADMIN — MIDODRINE HYDROCHLORIDE 2.5 MG: 5 TABLET ORAL at 17:08

## 2024-12-14 RX ADMIN — TOBRAMYCIN 1 DROP: 3 SOLUTION/ DROPS OPHTHALMIC at 12:48

## 2024-12-14 RX ADMIN — LEVOTHYROXINE SODIUM 137 MCG: 25 TABLET ORAL at 06:01

## 2024-12-14 RX ADMIN — TOBRAMYCIN 1 DROP: 3 SOLUTION/ DROPS OPHTHALMIC at 09:02

## 2024-12-14 NOTE — PLAN OF CARE
Problem: Potential for Falls  Goal: Patient will remain free of falls  Description: INTERVENTIONS:  - Educate patient/family on patient safety including physical limitations  - Instruct patient to call for assistance with activity   - Consult OT/PT to assist with strengthening/mobility   - Keep Call bell within reach  - Keep bed low and locked with side rails adjusted as appropriate  - Keep care items and personal belongings within reach  - Initiate and maintain comfort rounds  - Make Fall Risk Sign visible to staff  - Offer Toileting every 2 Hours, in advance of need  - Initiate/Maintain bed alarm  - Obtain necessary fall risk management equipment:   - Apply yellow socks and bracelet for high fall risk patients  - Consider moving patient to room near nurses station  Outcome: Progressing     Problem: PAIN - ADULT  Goal: Verbalizes/displays adequate comfort level or baseline comfort level  Description: Interventions:  - Encourage patient to monitor pain and request assistance  - Assess pain using appropriate pain scale  - Administer analgesics based on type and severity of pain and evaluate response  - Implement non-pharmacological measures as appropriate and evaluate response  - Consider cultural and social influences on pain and pain management  - Notify physician/advanced practitioner if interventions unsuccessful or patient reports new pain  Outcome: Progressing     Problem: INFECTION - ADULT  Goal: Absence or prevention of progression during hospitalization  Description: INTERVENTIONS:  - Assess and monitor for signs and symptoms of infection  - Monitor lab/diagnostic results  - Monitor all insertion sites, i.e. indwelling lines, tubes, and drains  - Monitor endotracheal if appropriate and nasal secretions for changes in amount and color  - [place] appropriate cooling/warming therapies per order  - Administer medications as ordered  - Instruct and encourage patient and family to use good hand hygiene  technique  - Identify and instruct in appropriate isolation precautions for identified infection/condition  Outcome: Progressing  Goal: Absence of fever/infection during neutropenic period  Description: INTERVENTIONS:  - Monitor WBC    Outcome: Progressing     Problem: SAFETY ADULT  Goal: Patient will remain free of falls  Description: INTERVENTIONS:  - Educate patient/family on patient safety including physical limitations  - Instruct patient to call for assistance with activity   - Consult OT/PT to assist with strengthening/mobility   - Keep Call bell within reach  - Keep bed low and locked with side rails adjusted as appropriate  - Keep care items and personal belongings within reach  - Initiate and maintain comfort rounds  - Make Fall Risk Sign visible to staff  - Offer Toileting every 2 Hours, in advance of need  - Initiate/Maintain bed alarm  - Obtain necessary fall risk management equipment:   - Apply yellow socks and bracelet for high fall risk patients  - Consider moving patient to room near nurses station  Outcome: Progressing  Goal: Maintain or return to baseline ADL function  Description: INTERVENTIONS:  -  Assess patient's ability to carry out ADLs; assess patient's baseline for ADL function and identify physical deficits which impact ability to perform ADLs (bathing, care of mouth/teeth, toileting, grooming, dressing, etc.)  - Assess/evaluate cause of self-care deficits   - Assess range of motion  - Assess patient's mobility; develop plan if impaired  - Assess patient's need for assistive devices and provide as appropriate  - Encourage maximum independence but intervene and supervise when necessary  - Involve family in performance of ADLs  - Assess for home care needs following discharge   - Consider OT consult to assist with ADL evaluation and planning for discharge  - Provide patient education as appropriate  Outcome: Progressing  Goal: Maintains/Returns to pre admission functional  level  Description: INTERVENTIONS:  - Perform AM-PAC 6 Click Basic Mobility/ Daily Activity assessment daily.  - Set and communicate daily mobility goal to care team and patient/family/caregiver.   - Collaborate with rehabilitation services on mobility goals if consulted  - Perform Range of Motion 3 times a day.  - Reposition patient every 2 hours.  - Record patient progress and toleration of activity level   Outcome: Progressing     Problem: DISCHARGE PLANNING  Goal: Discharge to home or other facility with appropriate resources  Description: INTERVENTIONS:  - Identify barriers to discharge w/patient and caregiver  - Arrange for needed discharge resources and transportation as appropriate  - Identify discharge learning needs (meds, wound care, etc.)  - Arrange for interpretive services to assist at discharge as needed  - Refer to Case Management Department for coordinating discharge planning if the patient needs post-hospital services based on physician/advanced practitioner order or complex needs related to functional status, cognitive ability, or social support system  Outcome: Progressing     Problem: Knowledge Deficit  Goal: Patient/family/caregiver demonstrates understanding of disease process, treatment plan, medications, and discharge instructions  Description: Complete learning assessment and assess knowledge base.  Interventions:  - Provide teaching at level of understanding  - Provide teaching via preferred learning methods  Outcome: Progressing     Problem: NEUROSENSORY - ADULT  Goal: Achieves stable or improved neurological status  Description: INTERVENTIONS  - Monitor and report changes in neurological status  - Monitor vital signs such as temperature, blood pressure, glucose, and any other labs ordered   - Initiate measures to prevent increased intracranial pressure  - Monitor for seizure activity and implement precautions if appropriate      Outcome: Progressing  Goal: Remains free of injury related  to seizures activity  Description: INTERVENTIONS  - Maintain airway, patient safety  and administer oxygen as ordered  - Monitor patient for seizure activity, document and report duration and description of seizure to physician/advanced practitioner  - If seizure occurs,  ensure patient safety during seizure  - Reorient patient post seizure  - Seizure pads on all 4 side rails  - Instruct patient/family to notify RN of any seizure activity including if an aura is experienced  - Instruct patient/family to call for assistance with activity based on nursing assessment  - Administer anti-seizure medications if ordered    Outcome: Progressing  Goal: Achieves maximal functionality and self care  Description: INTERVENTIONS  - Monitor swallowing and airway patency with patient fatigue and changes in neurological status  - Encourage and assist patient to increase activity and self care.   - Encourage visually impaired, hearing impaired and aphasic patients to use assistive/communication devices  Outcome: Progressing     Problem: CARDIOVASCULAR - ADULT  Goal: Maintains optimal cardiac output and hemodynamic stability  Description: INTERVENTIONS:  - Monitor I/O, vital signs and rhythm  - Monitor for S/S and trends of decreased cardiac output  - Administer and titrate ordered vasoactive medications to optimize hemodynamic stability  - Assess quality of pulses, skin color and temperature  - Assess for signs of decreased coronary artery perfusion  - Instruct patient to report change in severity of symptoms  Outcome: Progressing  Goal: Absence of cardiac dysrhythmias or at baseline rhythm  Description: INTERVENTIONS:  - Continuous cardiac monitoring, vital signs, obtain 12 lead EKG if ordered  - Administer antiarrhythmic and heart rate control medications as ordered  - Monitor electrolytes and administer replacement therapy as ordered  Outcome: Progressing     Problem: RESPIRATORY - ADULT  Goal: Achieves optimal ventilation and  oxygenation  Description: INTERVENTIONS:  - Assess for changes in respiratory status  - Assess for changes in mentation and behavior  - Position to facilitate oxygenation and minimize respiratory effort  - Oxygen administered by appropriate delivery if ordered  - Initiate smoking cessation education as indicated  - Encourage broncho-pulmonary hygiene including cough, deep breathe, Incentive Spirometry  - Assess the need for suctioning and aspirate as needed  - Assess and instruct to report SOB or any respiratory difficulty  - Respiratory Therapy support as indicated  Outcome: Progressing     Problem: GENITOURINARY - ADULT  Goal: Maintains or returns to baseline urinary function  Description: INTERVENTIONS:  - Assess urinary function  - Encourage oral fluids to ensure adequate hydration if ordered  - Administer IV fluids as ordered to ensure adequate hydration  - Administer ordered medications as needed  - Offer frequent toileting  - Follow urinary retention protocol if ordered  Outcome: Progressing  Goal: Absence of urinary retention  Description: INTERVENTIONS:  - Assess patient’s ability to void and empty bladder  - Monitor I/O  - Bladder scan as needed  - Discuss with physician/AP medications to alleviate retention as needed  - Discuss catheterization for long term situations as appropriate  Outcome: Progressing  Goal: Urinary catheter remains patent  Description: INTERVENTIONS:  - Assess patency of urinary catheter  - If patient has a chronic sadler, consider changing catheter if non-functioning  - Follow guidelines for intermittent irrigation of non-functioning urinary catheter  Outcome: Progressing     Problem: METABOLIC, FLUID AND ELECTROLYTES - ADULT  Goal: Electrolytes maintained within normal limits  Description: INTERVENTIONS:  - Monitor labs and assess patient for signs and symptoms of electrolyte imbalances  - Administer electrolyte replacement as ordered  - Monitor response to electrolyte replacements,  including repeat lab results as appropriate  - Instruct patient on fluid and nutrition as appropriate  Outcome: Progressing  Goal: Fluid balance maintained  Description: INTERVENTIONS:  - Monitor labs   - Monitor I/O and WT  - Instruct patient on fluid and nutrition as appropriate  - Assess for signs & symptoms of volume excess or deficit  Outcome: Progressing  Goal: Glucose maintained within target range  Description: INTERVENTIONS:  - Monitor Blood Glucose as ordered  - Assess for signs and symptoms of hyperglycemia and hypoglycemia  - Administer ordered medications to maintain glucose within target range  - Assess nutritional intake and initiate nutrition service referral as needed  Outcome: Progressing     Problem: Nutrition/Hydration-ADULT  Goal: Nutrient/Hydration intake appropriate for improving, restoring or maintaining nutritional needs  Description: Monitor and assess patient's nutrition/hydration status for malnutrition. Collaborate with interdisciplinary team and initiate plan and interventions as ordered.  Monitor patient's weight and dietary intake as ordered or per policy. Utilize nutrition screening tool and intervene as necessary. Determine patient's food preferences and provide high-protein, high-caloric foods as appropriate.     INTERVENTIONS:  - Monitor oral intake, urinary output, labs, and treatment plans  - Assess nutrition and hydration status and recommend course of action  - Evaluate amount of meals eaten  - Assist patient with eating if necessary   - Allow adequate time for meals  - Recommend/ encourage appropriate diets, oral nutritional supplements, and vitamin/mineral supplements  - Order, calculate, and assess calorie counts as needed  - Recommend, monitor, and adjust tube feedings and TPN/PPN based on assessed needs  - Assess need for intravenous fluids  - Provide specific nutrition/hydration education as appropriate  - Include patient/family/caregiver in decisions related to  nutrition  Outcome: Progressing     Problem: Prexisting or High Potential for Compromised Skin Integrity  Goal: Skin integrity is maintained or improved  Description: INTERVENTIONS:  - Identify patients at risk for skin breakdown  - Assess and monitor skin integrity  - Assess and monitor nutrition and hydration status  - Monitor labs   - Assess for incontinence   - Turn and reposition patient  - Assist with mobility/ambulation  - Relieve pressure over bony prominences  - Avoid friction and shearing  - Provide appropriate hygiene as needed including keeping skin clean and dry  - Evaluate need for skin moisturizer/barrier cream  - Collaborate with interdisciplinary team   - Patient/family teaching  - Consider wound care consult   Outcome: Progressing

## 2024-12-14 NOTE — ASSESSMENT & PLAN NOTE
She was given IV lasix and placed on bipap overnight. Currently on o2 supplement  and nocturnal bipap  Chronic hypercapnia  Pulmonology - spirometry done shows restrictive pattern sec to obese habitus.   [facility] and  to arrange BiPAP to be delivered before discharge on Monday

## 2024-12-14 NOTE — PROGRESS NOTES
Progress Note - Internal Medicine   Name: Zee Kirk 74 y.o. female I MRN: [id number]  Unit/Bed#: -01 I Date of Admission: 12/11/2024   Date of Service: 12/14/2024 I Hospital Day: 3     Assessment & Plan  Acute respiratory failure with hypoxia and hypercapnia (HCC)  She was given IV lasix and placed on bipap overnight. Currently on o2 supplement  and nocturnal bipap  Chronic hypercapnia  Pulmonology - spirometry done shows restrictive pattern sec to obese habitus.   [facility] and  to arrange BiPAP to be delivered before discharge on Monday   Hypothyroid  Cont synthroid  Class 3 severe obesity due to excess calories with serious comorbidity and body mass index (BMI) greater than or equal to 70 in adult (HCC)  Diet control and lifestyle changes counseled  Acute on chronic diastolic congestive heart failure (HCC)  Wt Readings from Last 3 Encounters:   12/14/24 (!) 203 kg (446 lb 11.1 oz)   12/05/24 (!) 193 kg (425 lb)   11/10/24 (!) 193 kg (424 lb 12.8 oz)       Depression with anxiety  Stable mood  Impaired mobility  Patient is bed-bound  Cataract, nuclear sclerotic senile, right    Paroxysmal A-fib (HCC)  Patient on coumadin, Coumadin 2 mg M/T/W/F/Sa/Marquez and 4 mg Th   History of kidney stones    Skin abnormalities      24 Hour Events : no acute events overnight  Subjective : patient seen at bedside. Alert, awake, denies any sob, on 1.5 O2 via NC    Objective :  Temp:  [98 °F (36.7 °C)-98.5 °F (36.9 °C)] 98 °F (36.7 °C)  HR:  [53-89] 53  BP: (100-113)/(43-61) 100/43  Resp:  [17-18] 17  SpO2:  [93 %-96 %] 96 %  O2 Device: Nasal cannula  Nasal Cannula O2 Flow Rate (L/min):  [1 L/min-2 L/min] 1 L/min    Physical Exam  Skin:     General: Skin is warm and dry.      Comments  HENT:      Head: Normocephalic.      Mouth/Throat:      Mouth: Mucous membranes are dry.   Cardiovascular:      Pulses: Normal pulses.   Musculoskeletal:         General: Normal range of motion.   Abdominal: General: Bowel sounds  are normal.      Palpations: Abdomen is soft.   Constitutional:       General: She is awake.      Appearance: morbidly obese, on oxygen via nasal canula     Comments:   Pulmonary:      Breath sounds: diminished bs on the bases, good effort, present.   Psychiatric:         Behavior: Behavior is cooperative.   Neurological:      Mental Status: She is alert.   Genitourinary/Anorectal:       Lab Results: I have reviewed the following results:          VTE Pharmacologic Prophylaxis: Warfarin (Coumadin)  VTE Mechanical Prophylaxis: sequential compression device

## 2024-12-14 NOTE — ASSESSMENT & PLAN NOTE
Wt Readings from Last 3 Encounters:   12/14/24 (!) 203 kg (446 lb 11.1 oz)   12/05/24 (!) 193 kg (425 lb)   11/10/24 (!) 193 kg (424 lb 12.8 oz)

## 2024-12-15 LAB
GLUCOSE SERPL-MCNC: 108 MG/DL (ref 65–140)
GLUCOSE SERPL-MCNC: 114 MG/DL (ref 65–140)
GLUCOSE SERPL-MCNC: 116 MG/DL (ref 65–140)
GLUCOSE SERPL-MCNC: 94 MG/DL (ref 65–140)
MRSA NOSE QL CULT: NORMAL

## 2024-12-15 PROCEDURE — 82948 REAGENT STRIP/BLOOD GLUCOSE: CPT

## 2024-12-15 PROCEDURE — 99232 SBSQ HOSP IP/OBS MODERATE 35: CPT | Performed by: INTERNAL MEDICINE

## 2024-12-15 PROCEDURE — 94660 CPAP INITIATION&MGMT: CPT

## 2024-12-15 PROCEDURE — 94760 N-INVAS EAR/PLS OXIMETRY 1: CPT

## 2024-12-15 RX ORDER — POLYETHYLENE GLYCOL 3350 17 G/17G
17 POWDER, FOR SOLUTION ORAL DAILY
Status: DISCONTINUED | OUTPATIENT
Start: 2024-12-16 | End: 2024-12-15

## 2024-12-15 RX ORDER — POLYETHYLENE GLYCOL 3350 17 G/17G
17 POWDER, FOR SOLUTION ORAL DAILY
Status: DISCONTINUED | OUTPATIENT
Start: 2024-12-15 | End: 2024-12-16 | Stop reason: HOSPADM

## 2024-12-15 RX ORDER — DOCUSATE SODIUM 100 MG/1
100 CAPSULE, LIQUID FILLED ORAL 2 TIMES DAILY
Status: DISCONTINUED | OUTPATIENT
Start: 2024-12-15 | End: 2024-12-15

## 2024-12-15 RX ADMIN — GABAPENTIN 200 MG: 100 CAPSULE ORAL at 21:50

## 2024-12-15 RX ADMIN — PREDNISOLONE ACETATE 1 DROP: 10 SUSPENSION/ DROPS OPHTHALMIC at 12:12

## 2024-12-15 RX ADMIN — TOBRAMYCIN 1 DROP: 3 SOLUTION/ DROPS OPHTHALMIC at 09:24

## 2024-12-15 RX ADMIN — TOBRAMYCIN 1 DROP: 3 SOLUTION/ DROPS OPHTHALMIC at 21:50

## 2024-12-15 RX ADMIN — TRIAMCINOLONE ACETONIDE: 5 CREAM TOPICAL at 18:02

## 2024-12-15 RX ADMIN — LEVOTHYROXINE SODIUM 137 MCG: 25 TABLET ORAL at 06:02

## 2024-12-15 RX ADMIN — MIDODRINE HYDROCHLORIDE 2.5 MG: 5 TABLET ORAL at 06:02

## 2024-12-15 RX ADMIN — MICONAZOLE NITRATE: 20 CREAM TOPICAL at 18:02

## 2024-12-15 RX ADMIN — MIDODRINE HYDROCHLORIDE 2.5 MG: 5 TABLET ORAL at 17:56

## 2024-12-15 RX ADMIN — PREDNISOLONE ACETATE 1 DROP: 10 SUSPENSION/ DROPS OPHTHALMIC at 21:50

## 2024-12-15 RX ADMIN — GABAPENTIN 200 MG: 100 CAPSULE ORAL at 09:24

## 2024-12-15 RX ADMIN — ACETAMINOPHEN 650 MG: 325 TABLET ORAL at 09:30

## 2024-12-15 RX ADMIN — PREDNISOLONE ACETATE 1 DROP: 10 SUSPENSION/ DROPS OPHTHALMIC at 09:25

## 2024-12-15 RX ADMIN — PREDNISOLONE ACETATE 1 DROP: 10 SUSPENSION/ DROPS OPHTHALMIC at 18:02

## 2024-12-15 RX ADMIN — FUROSEMIDE 60 MG: 20 TABLET ORAL at 07:56

## 2024-12-15 RX ADMIN — TRIAMCINOLONE ACETONIDE: 5 CREAM TOPICAL at 09:24

## 2024-12-15 RX ADMIN — POLYETHYLENE GLYCOL 3350 17 G: 17 POWDER, FOR SOLUTION ORAL at 21:50

## 2024-12-15 RX ADMIN — TOBRAMYCIN 1 DROP: 3 SOLUTION/ DROPS OPHTHALMIC at 18:02

## 2024-12-15 RX ADMIN — ALLOPURINOL 100 MG: 100 TABLET ORAL at 09:24

## 2024-12-15 RX ADMIN — TOBRAMYCIN 1 DROP: 3 SOLUTION/ DROPS OPHTHALMIC at 12:12

## 2024-12-15 RX ADMIN — GABAPENTIN 200 MG: 100 CAPSULE ORAL at 17:56

## 2024-12-15 RX ADMIN — MICONAZOLE NITRATE: 20 CREAM TOPICAL at 09:24

## 2024-12-15 RX ADMIN — WARFARIN SODIUM 2 MG: 2 TABLET ORAL at 17:56

## 2024-12-15 RX ADMIN — MIDODRINE HYDROCHLORIDE 2.5 MG: 5 TABLET ORAL at 12:12

## 2024-12-15 NOTE — PLAN OF CARE
Problem: PAIN - ADULT  Goal: Verbalizes/displays adequate comfort level or baseline comfort level  Description: Interventions:  - Encourage patient to monitor pain and request assistance  - Assess pain using appropriate pain scale  - Administer analgesics based on type and severity of pain and evaluate response  - Implement non-pharmacological measures as appropriate and evaluate response  - Consider cultural and social influences on pain and pain management  - Notify physician/advanced practitioner if interventions unsuccessful or patient reports new pain  Outcome: Progressing     Problem: NEUROSENSORY - ADULT  Goal: Achieves stable or improved neurological status  Description: INTERVENTIONS  - Monitor and report changes in neurological status  - Monitor vital signs such as temperature, blood pressure, glucose, and any other labs ordered   - Initiate measures to prevent increased intracranial pressure  - Monitor for seizure activity and implement precautions if appropriate      Outcome: Progressing     Problem: CARDIOVASCULAR - ADULT  Goal: Maintains optimal cardiac output and hemodynamic stability  Description: INTERVENTIONS:  - Monitor I/O, vital signs and rhythm  - Monitor for S/S and trends of decreased cardiac output  - Administer and titrate ordered vasoactive medications to optimize hemodynamic stability  - Assess quality of pulses, skin color and temperature  - Assess for signs of decreased coronary artery perfusion  - Instruct patient to report change in severity of symptoms  Outcome: Progressing     Problem: RESPIRATORY - ADULT  Goal: Achieves optimal ventilation and oxygenation  Description: INTERVENTIONS:  - Assess for changes in respiratory status  - Assess for changes in mentation and behavior  - Position to facilitate oxygenation and minimize respiratory effort  - Oxygen administered by appropriate delivery if ordered  - Initiate smoking cessation education as indicated  - Encourage  broncho-pulmonary hygiene including cough, deep breathe, Incentive Spirometry  - Assess the need for suctioning and aspirate as needed  - Assess and instruct to report SOB or any respiratory difficulty  - Respiratory Therapy support as indicated  Outcome: Progressing     Problem: GENITOURINARY - ADULT  Goal: Absence of urinary retention  Description: INTERVENTIONS:  - Assess patient’s ability to void and empty bladder  - Monitor I/O  - Bladder scan as needed  - Discuss with physician/AP medications to alleviate retention as needed  - Discuss catheterization for long term situations as appropriate  Outcome: Progressing

## 2024-12-15 NOTE — ASSESSMENT & PLAN NOTE
Wt Readings from Last 3 Encounters:   12/15/24 (!) 203 kg (447 lb 1.6 oz)   12/05/24 (!) 193 kg (425 lb)   11/10/24 (!) 193 kg (424 lb 12.8 oz)

## 2024-12-15 NOTE — PROGRESS NOTES
Progress Note - Internal Medicine   Name: Zee Kirk 74 y.o. female I MRN: [id number]  Unit/Bed#: -01 I Date of Admission: 12/11/2024   Date of Service: 12/15/2024 I Hospital Day: 4     Assessment & Plan  Acute respiratory failure with hypoxia and hypercapnia (HCC)  She was given IV lasix and placed on bipap overnight. Currently on o2 supplement  and nocturnal bipap  Chronic hypercapnia  Pulmonology - spirometry done shows restrictive pattern sec to obese habitus.   [facility] and  to arrange BiPAP to be delivered before discharge on Monday   Hypothyroid  Cont synthroid  Class 3 severe obesity due to excess calories with serious comorbidity and body mass index (BMI) greater than or equal to 70 in adult (HCC)  Diet control and lifestyle changes counseled  Acute on chronic diastolic congestive heart failure (HCC)  Wt Readings from Last 3 Encounters:   12/15/24 (!) 203 kg (447 lb 1.6 oz)   12/05/24 (!) 193 kg (425 lb)   11/10/24 (!) 193 kg (424 lb 12.8 oz)       Depression with anxiety  Stable mood  Impaired mobility  Patient is bed-bound  Cataract, nuclear sclerotic senile, right    Paroxysmal A-fib (HCC)  Patient on coumadin, Coumadin 2 mg M/T/W/F/Sa/Marquez and 4 mg Th   History of kidney stones    Skin abnormalities      24 Hour Events : no acute events overnight  Subjective : patient seen at bedside. She feels weak, but other than that has no new complaints    Objective :  Temp:  [98.2 °F (36.8 °C)-98.3 °F (36.8 °C)] 98.3 °F (36.8 °C)  HR:  [57-72] 57  BP: ()/(49-60) 113/50  Resp:  [18-19] 18  SpO2:  [91 %-96 %] 91 %  O2 Device: Nasal cannula    Physical Exam  Skin:     General: Skin is warm and dry.      Comments  HENT:      Head: Normocephalic.      Mouth/Throat:      Mouth: Mucous membranes are dry.   Cardiovascular:      Pulses: Normal pulses.   Musculoskeletal:         General: Normal range of motion.   Abdominal: General: Bowel sounds are normal.      Palpations: Abdomen is soft.    Constitutional:       General: She is awake.      Appearance: morbidly obese, on oxygen via nasal canula     Comments:   Pulmonary:      Breath sounds: diminished bs on the bases, good effort, present.   Psychiatric:         Behavior: Behavior is cooperative.   Neurological:      Mental Status: She is alert.   Genitourinary/Anorectal:       Lab Results: I have reviewed the following results:          VTE Pharmacologic Prophylaxis: Warfarin (Coumadin)  VTE Mechanical Prophylaxis: sequential compression device

## 2024-12-15 NOTE — PLAN OF CARE
Problem: Potential for Falls  Goal: Patient will remain free of falls  Description: INTERVENTIONS:  - Educate patient/family on patient safety including physical limitations  - Instruct patient to call for assistance with activity   - Consult OT/PT to assist with strengthening/mobility   - Keep Call bell within reach  - Keep bed low and locked with side rails adjusted as appropriate  - Keep care items and personal belongings within reach  - Initiate and maintain comfort rounds  - Make Fall Risk Sign visible to staff  - Offer Toileting every 2 Hours, in advance of need  - Initiate/Maintain bed alarm  - Obtain necessary fall risk management equipment:   - Apply yellow socks and bracelet for high fall risk patients  - Consider moving patient to room near nurses station  Outcome: Progressing     Problem: PAIN - ADULT  Goal: Verbalizes/displays adequate comfort level or baseline comfort level  Description: Interventions:  - Encourage patient to monitor pain and request assistance  - Assess pain using appropriate pain scale  - Administer analgesics based on type and severity of pain and evaluate response  - Implement non-pharmacological measures as appropriate and evaluate response  - Consider cultural and social influences on pain and pain management  - Notify physician/advanced practitioner if interventions unsuccessful or patient reports new pain  Outcome: Progressing     Problem: INFECTION - ADULT  Goal: Absence or prevention of progression during hospitalization  Description: INTERVENTIONS:  - Assess and monitor for signs and symptoms of infection  - Monitor lab/diagnostic results  - Monitor all insertion sites, i.e. indwelling lines, tubes, and drains  - Monitor endotracheal if appropriate and nasal secretions for changes in amount and color  - [place] appropriate cooling/warming therapies per order  - Administer medications as ordered  - Instruct and encourage patient and family to use good hand hygiene  technique  - Identify and instruct in appropriate isolation precautions for identified infection/condition  Outcome: Progressing  Goal: Absence of fever/infection during neutropenic period  Description: INTERVENTIONS:  - Monitor WBC    Outcome: Progressing     Problem: SAFETY ADULT  Goal: Patient will remain free of falls  Description: INTERVENTIONS:  - Educate patient/family on patient safety including physical limitations  - Instruct patient to call for assistance with activity   - Consult OT/PT to assist with strengthening/mobility   - Keep Call bell within reach  - Keep bed low and locked with side rails adjusted as appropriate  - Keep care items and personal belongings within reach  - Initiate and maintain comfort rounds  - Make Fall Risk Sign visible to staff  - Offer Toileting every 2 Hours, in advance of need  - Initiate/Maintain bed alarm  - Obtain necessary fall risk management equipment:   - Apply yellow socks and bracelet for high fall risk patients  - Consider moving patient to room near nurses station  Outcome: Progressing  Goal: Maintain or return to baseline ADL function  Description: INTERVENTIONS:  -  Assess patient's ability to carry out ADLs; assess patient's baseline for ADL function and identify physical deficits which impact ability to perform ADLs (bathing, care of mouth/teeth, toileting, grooming, dressing, etc.)  - Assess/evaluate cause of self-care deficits   - Assess range of motion  - Assess patient's mobility; develop plan if impaired  - Assess patient's need for assistive devices and provide as appropriate  - Encourage maximum independence but intervene and supervise when necessary  - Involve family in performance of ADLs  - Assess for home care needs following discharge   - Consider OT consult to assist with ADL evaluation and planning for discharge  - Provide patient education as appropriate  Outcome: Progressing  Goal: Maintains/Returns to pre admission functional  level  Description: INTERVENTIONS:  - Perform AM-PAC 6 Click Basic Mobility/ Daily Activity assessment daily.  - Set and communicate daily mobility goal to care team and patient/family/caregiver.   - Collaborate with rehabilitation services on mobility goals if consulted  - Perform Range of Motion 3 times a day.  - Reposition patient every 2 hours.  - Record patient progress and toleration of activity level   Outcome: Progressing     Problem: DISCHARGE PLANNING  Goal: Discharge to home or other facility with appropriate resources  Description: INTERVENTIONS:  - Identify barriers to discharge w/patient and caregiver  - Arrange for needed discharge resources and transportation as appropriate  - Identify discharge learning needs (meds, wound care, etc.)  - Arrange for interpretive services to assist at discharge as needed  - Refer to Case Management Department for coordinating discharge planning if the patient needs post-hospital services based on physician/advanced practitioner order or complex needs related to functional status, cognitive ability, or social support system  Outcome: Progressing     Problem: Knowledge Deficit  Goal: Patient/family/caregiver demonstrates understanding of disease process, treatment plan, medications, and discharge instructions  Description: Complete learning assessment and assess knowledge base.  Interventions:  - Provide teaching at level of understanding  - Provide teaching via preferred learning methods  Outcome: Progressing     Problem: NEUROSENSORY - ADULT  Goal: Achieves stable or improved neurological status  Description: INTERVENTIONS  - Monitor and report changes in neurological status  - Monitor vital signs such as temperature, blood pressure, glucose, and any other labs ordered   - Initiate measures to prevent increased intracranial pressure  - Monitor for seizure activity and implement precautions if appropriate      Outcome: Progressing  Goal: Remains free of injury related  to seizures activity  Description: INTERVENTIONS  - Maintain airway, patient safety  and administer oxygen as ordered  - Monitor patient for seizure activity, document and report duration and description of seizure to physician/advanced practitioner  - If seizure occurs,  ensure patient safety during seizure  - Reorient patient post seizure  - Seizure pads on all 4 side rails  - Instruct patient/family to notify RN of any seizure activity including if an aura is experienced  - Instruct patient/family to call for assistance with activity based on nursing assessment  - Administer anti-seizure medications if ordered    Outcome: Progressing  Goal: Achieves maximal functionality and self care  Description: INTERVENTIONS  - Monitor swallowing and airway patency with patient fatigue and changes in neurological status  - Encourage and assist patient to increase activity and self care.   - Encourage visually impaired, hearing impaired and aphasic patients to use assistive/communication devices  Outcome: Progressing     Problem: CARDIOVASCULAR - ADULT  Goal: Maintains optimal cardiac output and hemodynamic stability  Description: INTERVENTIONS:  - Monitor I/O, vital signs and rhythm  - Monitor for S/S and trends of decreased cardiac output  - Administer and titrate ordered vasoactive medications to optimize hemodynamic stability  - Assess quality of pulses, skin color and temperature  - Assess for signs of decreased coronary artery perfusion  - Instruct patient to report change in severity of symptoms  Outcome: Progressing  Goal: Absence of cardiac dysrhythmias or at baseline rhythm  Description: INTERVENTIONS:  - Continuous cardiac monitoring, vital signs, obtain 12 lead EKG if ordered  - Administer antiarrhythmic and heart rate control medications as ordered  - Monitor electrolytes and administer replacement therapy as ordered  Outcome: Progressing     Problem: RESPIRATORY - ADULT  Goal: Achieves optimal ventilation and  oxygenation  Description: INTERVENTIONS:  - Assess for changes in respiratory status  - Assess for changes in mentation and behavior  - Position to facilitate oxygenation and minimize respiratory effort  - Oxygen administered by appropriate delivery if ordered  - Initiate smoking cessation education as indicated  - Encourage broncho-pulmonary hygiene including cough, deep breathe, Incentive Spirometry  - Assess the need for suctioning and aspirate as needed  - Assess and instruct to report SOB or any respiratory difficulty  - Respiratory Therapy support as indicated  Outcome: Progressing     Problem: GENITOURINARY - ADULT  Goal: Maintains or returns to baseline urinary function  Description: INTERVENTIONS:  - Assess urinary function  - Encourage oral fluids to ensure adequate hydration if ordered  - Administer IV fluids as ordered to ensure adequate hydration  - Administer ordered medications as needed  - Offer frequent toileting  - Follow urinary retention protocol if ordered  Outcome: Progressing  Goal: Absence of urinary retention  Description: INTERVENTIONS:  - Assess patient’s ability to void and empty bladder  - Monitor I/O  - Bladder scan as needed  - Discuss with physician/AP medications to alleviate retention as needed  - Discuss catheterization for long term situations as appropriate  Outcome: Progressing  Goal: Urinary catheter remains patent  Description: INTERVENTIONS:  - Assess patency of urinary catheter  - If patient has a chronic sadler, consider changing catheter if non-functioning  - Follow guidelines for intermittent irrigation of non-functioning urinary catheter  Outcome: Progressing     Problem: METABOLIC, FLUID AND ELECTROLYTES - ADULT  Goal: Electrolytes maintained within normal limits  Description: INTERVENTIONS:  - Monitor labs and assess patient for signs and symptoms of electrolyte imbalances  - Administer electrolyte replacement as ordered  - Monitor response to electrolyte replacements,  including repeat lab results as appropriate  - Instruct patient on fluid and nutrition as appropriate  Outcome: Progressing  Goal: Fluid balance maintained  Description: INTERVENTIONS:  - Monitor labs   - Monitor I/O and WT  - Instruct patient on fluid and nutrition as appropriate  - Assess for signs & symptoms of volume excess or deficit  Outcome: Progressing  Goal: Glucose maintained within target range  Description: INTERVENTIONS:  - Monitor Blood Glucose as ordered  - Assess for signs and symptoms of hyperglycemia and hypoglycemia  - Administer ordered medications to maintain glucose within target range  - Assess nutritional intake and initiate nutrition service referral as needed  Outcome: Progressing     Problem: Nutrition/Hydration-ADULT  Goal: Nutrient/Hydration intake appropriate for improving, restoring or maintaining nutritional needs  Description: Monitor and assess patient's nutrition/hydration status for malnutrition. Collaborate with interdisciplinary team and initiate plan and interventions as ordered.  Monitor patient's weight and dietary intake as ordered or per policy. Utilize nutrition screening tool and intervene as necessary. Determine patient's food preferences and provide high-protein, high-caloric foods as appropriate.     INTERVENTIONS:  - Monitor oral intake, urinary output, labs, and treatment plans  - Assess nutrition and hydration status and recommend course of action  - Evaluate amount of meals eaten  - Assist patient with eating if necessary   - Allow adequate time for meals  - Recommend/ encourage appropriate diets, oral nutritional supplements, and vitamin/mineral supplements  - Order, calculate, and assess calorie counts as needed  - Recommend, monitor, and adjust tube feedings and TPN/PPN based on assessed needs  - Assess need for intravenous fluids  - Provide specific nutrition/hydration education as appropriate  - Include patient/family/caregiver in decisions related to  nutrition  Outcome: Progressing     Problem: Prexisting or High Potential for Compromised Skin Integrity  Goal: Skin integrity is maintained or improved  Description: INTERVENTIONS:  - Identify patients at risk for skin breakdown  - Assess and monitor skin integrity  - Assess and monitor nutrition and hydration status  - Monitor labs   - Assess for incontinence   - Turn and reposition patient  - Assist with mobility/ambulation  - Relieve pressure over bony prominences  - Avoid friction and shearing  - Provide appropriate hygiene as needed including keeping skin clean and dry  - Evaluate need for skin moisturizer/barrier cream  - Collaborate with interdisciplinary team   - Patient/family teaching  - Consider wound care consult   Outcome: Progressing

## 2024-12-16 VITALS
BODY MASS INDEX: 50.02 KG/M2 | SYSTOLIC BLOOD PRESSURE: 95 MMHG | HEART RATE: 60 BPM | DIASTOLIC BLOOD PRESSURE: 56 MMHG | RESPIRATION RATE: 18 BRPM | HEIGHT: 64 IN | WEIGHT: 293 LBS | OXYGEN SATURATION: 93 % | TEMPERATURE: 97.6 F

## 2024-12-16 LAB — GLUCOSE SERPL-MCNC: 94 MG/DL (ref 65–140)

## 2024-12-16 PROCEDURE — 94760 N-INVAS EAR/PLS OXIMETRY 1: CPT

## 2024-12-16 PROCEDURE — 82948 REAGENT STRIP/BLOOD GLUCOSE: CPT

## 2024-12-16 PROCEDURE — 99238 HOSP IP/OBS DSCHRG MGMT 30/<: CPT | Performed by: PHYSICIAN ASSISTANT

## 2024-12-16 RX ADMIN — ALLOPURINOL 100 MG: 100 TABLET ORAL at 08:40

## 2024-12-16 RX ADMIN — PREDNISOLONE ACETATE 1 DROP: 10 SUSPENSION/ DROPS OPHTHALMIC at 08:44

## 2024-12-16 RX ADMIN — MIDODRINE HYDROCHLORIDE 2.5 MG: 5 TABLET ORAL at 06:16

## 2024-12-16 RX ADMIN — TOBRAMYCIN 1 DROP: 3 SOLUTION/ DROPS OPHTHALMIC at 08:46

## 2024-12-16 RX ADMIN — GABAPENTIN 200 MG: 100 CAPSULE ORAL at 08:40

## 2024-12-16 RX ADMIN — TRIAMCINOLONE ACETONIDE: 5 CREAM TOPICAL at 08:47

## 2024-12-16 RX ADMIN — LEVOTHYROXINE SODIUM 137 MCG: 25 TABLET ORAL at 06:16

## 2024-12-16 RX ADMIN — MICONAZOLE NITRATE: 20 CREAM TOPICAL at 08:48

## 2024-12-16 NOTE — ASSESSMENT & PLAN NOTE
Pt with multiple weeping wounds  Pt uses Kenalog and Neosporin for wound care at home  According to documentation from most recent hospitalization, pt is well known to wound care team  Continue outpatient follow-up with wound care

## 2024-12-16 NOTE — PLAN OF CARE
Home Oxygen Qualifying Test     Patient name: Zee Kirk        : 1950   Date of Test:  2024  Diagnosis:    Home Oxygen Test:    **Medicare Guidelines require item(s) 1-5 on all ambulatory patients or 1 and 2 on non-ambulatory patients.    1. Baseline SPO2 on Room Air at rest 90 -94%       []  Supplemental Home Oxygen is indicated.    [x]  Client does not qualify for home oxygen.    Respiratory Additional Notes- Pt is bed bound so no ambulation was done. On average on room air SpO2 remained above 90%. The occasional dips below 90% occurred when pt was using her hand with the pulse ox probe on to use her phone but she would very quickly recover back into the 90's. No supplemental oxygen is required.     Mila Hernandez, RT

## 2024-12-16 NOTE — PLAN OF CARE
Problem: Potential for Falls  Goal: Patient will remain free of falls  Description: INTERVENTIONS:  - Educate patient/family on patient safety including physical limitations  - Instruct patient to call for assistance with activity   - Consult OT/PT to assist with strengthening/mobility   - Keep Call bell within reach  - Keep bed low and locked with side rails adjusted as appropriate  - Keep care items and personal belongings within reach  - Initiate and maintain comfort rounds  - Make Fall Risk Sign visible to staff  - Offer Toileting every 2 Hours, in advance of need  - Initiate/Maintain bed alarm  - Obtain necessary fall risk management equipment:   - Apply yellow socks and bracelet for high fall risk patients  - Consider moving patient to room near nurses station  Outcome: Progressing     Problem: PAIN - ADULT  Goal: Verbalizes/displays adequate comfort level or baseline comfort level  Description: Interventions:  - Encourage patient to monitor pain and request assistance  - Assess pain using appropriate pain scale  - Administer analgesics based on type and severity of pain and evaluate response  - Implement non-pharmacological measures as appropriate and evaluate response  - Consider cultural and social influences on pain and pain management  - Notify physician/advanced practitioner if interventions unsuccessful or patient reports new pain  Outcome: Progressing     Problem: INFECTION - ADULT  Goal: Absence or prevention of progression during hospitalization  Description: INTERVENTIONS:  - Assess and monitor for signs and symptoms of infection  - Monitor lab/diagnostic results  - Monitor all insertion sites, i.e. indwelling lines, tubes, and drains  - Monitor endotracheal if appropriate and nasal secretions for changes in amount and color  - [place] appropriate cooling/warming therapies per order  - Administer medications as ordered  - Instruct and encourage patient and family to use good hand hygiene  technique  - Identify and instruct in appropriate isolation precautions for identified infection/condition  Outcome: Progressing  Goal: Absence of fever/infection during neutropenic period  Description: INTERVENTIONS:  - Monitor WBC    Outcome: Progressing     Problem: SAFETY ADULT  Goal: Patient will remain free of falls  Description: INTERVENTIONS:  - Educate patient/family on patient safety including physical limitations  - Instruct patient to call for assistance with activity   - Consult OT/PT to assist with strengthening/mobility   - Keep Call bell within reach  - Keep bed low and locked with side rails adjusted as appropriate  - Keep care items and personal belongings within reach  - Initiate and maintain comfort rounds  - Make Fall Risk Sign visible to staff  - Offer Toileting every 2 Hours, in advance of need  - Initiate/Maintain bed alarm  - Obtain necessary fall risk management equipment:   - Apply yellow socks and bracelet for high fall risk patients  - Consider moving patient to room near nurses station  Outcome: Progressing  Goal: Maintain or return to baseline ADL function  Description: INTERVENTIONS:  -  Assess patient's ability to carry out ADLs; assess patient's baseline for ADL function and identify physical deficits which impact ability to perform ADLs (bathing, care of mouth/teeth, toileting, grooming, dressing, etc.)  - Assess/evaluate cause of self-care deficits   - Assess range of motion  - Assess patient's mobility; develop plan if impaired  - Assess patient's need for assistive devices and provide as appropriate  - Encourage maximum independence but intervene and supervise when necessary  - Involve family in performance of ADLs  - Assess for home care needs following discharge   - Consider OT consult to assist with ADL evaluation and planning for discharge  - Provide patient education as appropriate  Outcome: Progressing  Goal: Maintains/Returns to pre admission functional  level  Description: INTERVENTIONS:  - Perform AM-PAC 6 Click Basic Mobility/ Daily Activity assessment daily.  - Set and communicate daily mobility goal to care team and patient/family/caregiver.   - Collaborate with rehabilitation services on mobility goals if consulted  - Perform Range of Motion 3 times a day.  - Reposition patient every 2 hours.  - Record patient progress and toleration of activity level   Outcome: Progressing     Problem: DISCHARGE PLANNING  Goal: Discharge to home or other facility with appropriate resources  Description: INTERVENTIONS:  - Identify barriers to discharge w/patient and caregiver  - Arrange for needed discharge resources and transportation as appropriate  - Identify discharge learning needs (meds, wound care, etc.)  - Arrange for interpretive services to assist at discharge as needed  - Refer to Case Management Department for coordinating discharge planning if the patient needs post-hospital services based on physician/advanced practitioner order or complex needs related to functional status, cognitive ability, or social support system  Outcome: Progressing     Problem: Knowledge Deficit  Goal: Patient/family/caregiver demonstrates understanding of disease process, treatment plan, medications, and discharge instructions  Description: Complete learning assessment and assess knowledge base.  Interventions:  - Provide teaching at level of understanding  - Provide teaching via preferred learning methods  Outcome: Progressing     Problem: NEUROSENSORY - ADULT  Goal: Achieves stable or improved neurological status  Description: INTERVENTIONS  - Monitor and report changes in neurological status  - Monitor vital signs such as temperature, blood pressure, glucose, and any other labs ordered   - Initiate measures to prevent increased intracranial pressure  - Monitor for seizure activity and implement precautions if appropriate      Outcome: Progressing  Goal: Remains free of injury related  to seizures activity  Description: INTERVENTIONS  - Maintain airway, patient safety  and administer oxygen as ordered  - Monitor patient for seizure activity, document and report duration and description of seizure to physician/advanced practitioner  - If seizure occurs,  ensure patient safety during seizure  - Reorient patient post seizure  - Seizure pads on all 4 side rails  - Instruct patient/family to notify RN of any seizure activity including if an aura is experienced  - Instruct patient/family to call for assistance with activity based on nursing assessment  - Administer anti-seizure medications if ordered    Outcome: Progressing  Goal: Achieves maximal functionality and self care  Description: INTERVENTIONS  - Monitor swallowing and airway patency with patient fatigue and changes in neurological status  - Encourage and assist patient to increase activity and self care.   - Encourage visually impaired, hearing impaired and aphasic patients to use assistive/communication devices  Outcome: Progressing     Problem: CARDIOVASCULAR - ADULT  Goal: Maintains optimal cardiac output and hemodynamic stability  Description: INTERVENTIONS:  - Monitor I/O, vital signs and rhythm  - Monitor for S/S and trends of decreased cardiac output  - Administer and titrate ordered vasoactive medications to optimize hemodynamic stability  - Assess quality of pulses, skin color and temperature  - Assess for signs of decreased coronary artery perfusion  - Instruct patient to report change in severity of symptoms  Outcome: Progressing  Goal: Absence of cardiac dysrhythmias or at baseline rhythm  Description: INTERVENTIONS:  - Continuous cardiac monitoring, vital signs, obtain 12 lead EKG if ordered  - Administer antiarrhythmic and heart rate control medications as ordered  - Monitor electrolytes and administer replacement therapy as ordered  Outcome: Progressing     Problem: RESPIRATORY - ADULT  Goal: Achieves optimal ventilation and  oxygenation  Description: INTERVENTIONS:  - Assess for changes in respiratory status  - Assess for changes in mentation and behavior  - Position to facilitate oxygenation and minimize respiratory effort  - Oxygen administered by appropriate delivery if ordered  - Initiate smoking cessation education as indicated  - Encourage broncho-pulmonary hygiene including cough, deep breathe, Incentive Spirometry  - Assess the need for suctioning and aspirate as needed  - Assess and instruct to report SOB or any respiratory difficulty  - Respiratory Therapy support as indicated  Outcome: Progressing     Problem: GENITOURINARY - ADULT  Goal: Maintains or returns to baseline urinary function  Description: INTERVENTIONS:  - Assess urinary function  - Encourage oral fluids to ensure adequate hydration if ordered  - Administer IV fluids as ordered to ensure adequate hydration  - Administer ordered medications as needed  - Offer frequent toileting  - Follow urinary retention protocol if ordered  Outcome: Progressing  Goal: Absence of urinary retention  Description: INTERVENTIONS:  - Assess patient’s ability to void and empty bladder  - Monitor I/O  - Bladder scan as needed  - Discuss with physician/AP medications to alleviate retention as needed  - Discuss catheterization for long term situations as appropriate  Outcome: Progressing  Goal: Urinary catheter remains patent  Description: INTERVENTIONS:  - Assess patency of urinary catheter  - If patient has a chronic sadler, consider changing catheter if non-functioning  - Follow guidelines for intermittent irrigation of non-functioning urinary catheter  Outcome: Progressing     Problem: METABOLIC, FLUID AND ELECTROLYTES - ADULT  Goal: Electrolytes maintained within normal limits  Description: INTERVENTIONS:  - Monitor labs and assess patient for signs and symptoms of electrolyte imbalances  - Administer electrolyte replacement as ordered  - Monitor response to electrolyte replacements,  including repeat lab results as appropriate  - Instruct patient on fluid and nutrition as appropriate  Outcome: Progressing  Goal: Fluid balance maintained  Description: INTERVENTIONS:  - Monitor labs   - Monitor I/O and WT  - Instruct patient on fluid and nutrition as appropriate  - Assess for signs & symptoms of volume excess or deficit  Outcome: Progressing  Goal: Glucose maintained within target range  Description: INTERVENTIONS:  - Monitor Blood Glucose as ordered  - Assess for signs and symptoms of hyperglycemia and hypoglycemia  - Administer ordered medications to maintain glucose within target range  - Assess nutritional intake and initiate nutrition service referral as needed  Outcome: Progressing     Problem: Nutrition/Hydration-ADULT  Goal: Nutrient/Hydration intake appropriate for improving, restoring or maintaining nutritional needs  Description: Monitor and assess patient's nutrition/hydration status for malnutrition. Collaborate with interdisciplinary team and initiate plan and interventions as ordered.  Monitor patient's weight and dietary intake as ordered or per policy. Utilize nutrition screening tool and intervene as necessary. Determine patient's food preferences and provide high-protein, high-caloric foods as appropriate.     INTERVENTIONS:  - Monitor oral intake, urinary output, labs, and treatment plans  - Assess nutrition and hydration status and recommend course of action  - Evaluate amount of meals eaten  - Assist patient with eating if necessary   - Allow adequate time for meals  - Recommend/ encourage appropriate diets, oral nutritional supplements, and vitamin/mineral supplements  - Order, calculate, and assess calorie counts as needed  - Recommend, monitor, and adjust tube feedings and TPN/PPN based on assessed needs  - Assess need for intravenous fluids  - Provide specific nutrition/hydration education as appropriate  - Include patient/family/caregiver in decisions related to  nutrition  Outcome: Progressing     Problem: Prexisting or High Potential for Compromised Skin Integrity  Goal: Skin integrity is maintained or improved  Description: INTERVENTIONS:  - Identify patients at risk for skin breakdown  - Assess and monitor skin integrity  - Assess and monitor nutrition and hydration status  - Monitor labs   - Assess for incontinence   - Turn and reposition patient  - Assist with mobility/ambulation  - Relieve pressure over bony prominences  - Avoid friction and shearing  - Provide appropriate hygiene as needed including keeping skin clean and dry  - Evaluate need for skin moisturizer/barrier cream  - Collaborate with interdisciplinary team   - Patient/family teaching  - Consider wound care consult   Outcome: Progressing

## 2024-12-16 NOTE — PLAN OF CARE
Problem: Potential for Falls  Goal: Patient will remain free of falls  Description: INTERVENTIONS:  - Educate patient/family on patient safety including physical limitations  - Instruct patient to call for assistance with activity   - Consult OT/PT to assist with strengthening/mobility   - Keep Call bell within reach  - Keep bed low and locked with side rails adjusted as appropriate  - Keep care items and personal belongings within reach  - Initiate and maintain comfort rounds  - Make Fall Risk Sign visible to staff  - Offer Toileting every 2 Hours, in advance of need  - Initiate/Maintain bed alarm  - Obtain necessary fall risk management equipment:   - Apply yellow socks and bracelet for high fall risk patients  - Consider moving patient to room near nurses station  Outcome: Progressing     Problem: PAIN - ADULT  Goal: Verbalizes/displays adequate comfort level or baseline comfort level  Description: Interventions:  - Encourage patient to monitor pain and request assistance  - Assess pain using appropriate pain scale  - Administer analgesics based on type and severity of pain and evaluate response  - Implement non-pharmacological measures as appropriate and evaluate response  - Consider cultural and social influences on pain and pain management  - Notify physician/advanced practitioner if interventions unsuccessful or patient reports new pain  Outcome: Progressing     Problem: INFECTION - ADULT  Goal: Absence or prevention of progression during hospitalization  Description: INTERVENTIONS:  - Assess and monitor for signs and symptoms of infection  - Monitor lab/diagnostic results  - Monitor all insertion sites, i.e. indwelling lines, tubes, and drains  - Monitor endotracheal if appropriate and nasal secretions for changes in amount and color  - Brewer appropriate cooling/warming therapies per order  - Administer medications as ordered  - Instruct and encourage patient and family to use good hand hygiene  technique  - Identify and instruct in appropriate isolation precautions for identified infection/condition  Outcome: Progressing  Goal: Absence of fever/infection during neutropenic period  Description: INTERVENTIONS:  - Monitor WBC    Outcome: Progressing     Problem: SAFETY ADULT  Goal: Patient will remain free of falls  Description: INTERVENTIONS:  - Educate patient/family on patient safety including physical limitations  - Instruct patient to call for assistance with activity   - Consult OT/PT to assist with strengthening/mobility   - Keep Call bell within reach  - Keep bed low and locked with side rails adjusted as appropriate  - Keep care items and personal belongings within reach  - Initiate and maintain comfort rounds  - Make Fall Risk Sign visible to staff  - Offer Toileting every 2 Hours, in advance of need  - Initiate/Maintain bed alarm  - Obtain necessary fall risk management equipment:   - Apply yellow socks and bracelet for high fall risk patients  - Consider moving patient to room near nurses station  Outcome: Progressing  Goal: Maintain or return to baseline ADL function  Description: INTERVENTIONS:  -  Assess patient's ability to carry out ADLs; assess patient's baseline for ADL function and identify physical deficits which impact ability to perform ADLs (bathing, care of mouth/teeth, toileting, grooming, dressing, etc.)  - Assess/evaluate cause of self-care deficits   - Assess range of motion  - Assess patient's mobility; develop plan if impaired  - Assess patient's need for assistive devices and provide as appropriate  - Encourage maximum independence but intervene and supervise when necessary  - Involve family in performance of ADLs  - Assess for home care needs following discharge   - Consider OT consult to assist with ADL evaluation and planning for discharge  - Provide patient education as appropriate  Outcome: Progressing  Goal: Maintains/Returns to pre admission functional  level  Description: INTERVENTIONS:  - Perform AM-PAC 6 Click Basic Mobility/ Daily Activity assessment daily.  - Set and communicate daily mobility goal to care team and patient/family/caregiver.   - Collaborate with rehabilitation services on mobility goals if consulted  - Perform Range of Motion 3 times a day.  - Reposition patient every 2 hours.  - Record patient progress and toleration of activity level   Outcome: Progressing     Problem: DISCHARGE PLANNING  Goal: Discharge to home or other facility with appropriate resources  Description: INTERVENTIONS:  - Identify barriers to discharge w/patient and caregiver  - Arrange for needed discharge resources and transportation as appropriate  - Identify discharge learning needs (meds, wound care, etc.)  - Arrange for interpretive services to assist at discharge as needed  - Refer to Case Management Department for coordinating discharge planning if the patient needs post-hospital services based on physician/advanced practitioner order or complex needs related to functional status, cognitive ability, or social support system  Outcome: Progressing     Problem: Knowledge Deficit  Goal: Patient/family/caregiver demonstrates understanding of disease process, treatment plan, medications, and discharge instructions  Description: Complete learning assessment and assess knowledge base.  Interventions:  - Provide teaching at level of understanding  - Provide teaching via preferred learning methods  Outcome: Progressing     Problem: NEUROSENSORY - ADULT  Goal: Achieves stable or improved neurological status  Description: INTERVENTIONS  - Monitor and report changes in neurological status  - Monitor vital signs such as temperature, blood pressure, glucose, and any other labs ordered   - Initiate measures to prevent increased intracranial pressure  - Monitor for seizure activity and implement precautions if appropriate      Outcome: Progressing  Goal: Remains free of injury related  to seizures activity  Description: INTERVENTIONS  - Maintain airway, patient safety  and administer oxygen as ordered  - Monitor patient for seizure activity, document and report duration and description of seizure to physician/advanced practitioner  - If seizure occurs,  ensure patient safety during seizure  - Reorient patient post seizure  - Seizure pads on all 4 side rails  - Instruct patient/family to notify RN of any seizure activity including if an aura is experienced  - Instruct patient/family to call for assistance with activity based on nursing assessment  - Administer anti-seizure medications if ordered    Outcome: Progressing  Goal: Achieves maximal functionality and self care  Description: INTERVENTIONS  - Monitor swallowing and airway patency with patient fatigue and changes in neurological status  - Encourage and assist patient to increase activity and self care.   - Encourage visually impaired, hearing impaired and aphasic patients to use assistive/communication devices  Outcome: Progressing     Problem: CARDIOVASCULAR - ADULT  Goal: Maintains optimal cardiac output and hemodynamic stability  Description: INTERVENTIONS:  - Monitor I/O, vital signs and rhythm  - Monitor for S/S and trends of decreased cardiac output  - Administer and titrate ordered vasoactive medications to optimize hemodynamic stability  - Assess quality of pulses, skin color and temperature  - Assess for signs of decreased coronary artery perfusion  - Instruct patient to report change in severity of symptoms  Outcome: Progressing  Goal: Absence of cardiac dysrhythmias or at baseline rhythm  Description: INTERVENTIONS:  - Continuous cardiac monitoring, vital signs, obtain 12 lead EKG if ordered  - Administer antiarrhythmic and heart rate control medications as ordered  - Monitor electrolytes and administer replacement therapy as ordered  Outcome: Progressing     Problem: RESPIRATORY - ADULT  Goal: Achieves optimal ventilation and  oxygenation  Description: INTERVENTIONS:  - Assess for changes in respiratory status  - Assess for changes in mentation and behavior  - Position to facilitate oxygenation and minimize respiratory effort  - Oxygen administered by appropriate delivery if ordered  - Initiate smoking cessation education as indicated  - Encourage broncho-pulmonary hygiene including cough, deep breathe, Incentive Spirometry  - Assess the need for suctioning and aspirate as needed  - Assess and instruct to report SOB or any respiratory difficulty  - Respiratory Therapy support as indicated  Outcome: Progressing     Problem: GENITOURINARY - ADULT  Goal: Maintains or returns to baseline urinary function  Description: INTERVENTIONS:  - Assess urinary function  - Encourage oral fluids to ensure adequate hydration if ordered  - Administer IV fluids as ordered to ensure adequate hydration  - Administer ordered medications as needed  - Offer frequent toileting  - Follow urinary retention protocol if ordered  Outcome: Progressing  Goal: Absence of urinary retention  Description: INTERVENTIONS:  - Assess patient’s ability to void and empty bladder  - Monitor I/O  - Bladder scan as needed  - Discuss with physician/AP medications to alleviate retention as needed  - Discuss catheterization for long term situations as appropriate  Outcome: Progressing  Goal: Urinary catheter remains patent  Description: INTERVENTIONS:  - Assess patency of urinary catheter  - If patient has a chronic sadler, consider changing catheter if non-functioning  - Follow guidelines for intermittent irrigation of non-functioning urinary catheter  Outcome: Progressing     Problem: METABOLIC, FLUID AND ELECTROLYTES - ADULT  Goal: Electrolytes maintained within normal limits  Description: INTERVENTIONS:  - Monitor labs and assess patient for signs and symptoms of electrolyte imbalances  - Administer electrolyte replacement as ordered  - Monitor response to electrolyte replacements,  including repeat lab results as appropriate  - Instruct patient on fluid and nutrition as appropriate  Outcome: Progressing  Goal: Fluid balance maintained  Description: INTERVENTIONS:  - Monitor labs   - Monitor I/O and WT  - Instruct patient on fluid and nutrition as appropriate  - Assess for signs & symptoms of volume excess or deficit  Outcome: Progressing  Goal: Glucose maintained within target range  Description: INTERVENTIONS:  - Monitor Blood Glucose as ordered  - Assess for signs and symptoms of hyperglycemia and hypoglycemia  - Administer ordered medications to maintain glucose within target range  - Assess nutritional intake and initiate nutrition service referral as needed  Outcome: Progressing     Problem: Nutrition/Hydration-ADULT  Goal: Nutrient/Hydration intake appropriate for improving, restoring or maintaining nutritional needs  Description: Monitor and assess patient's nutrition/hydration status for malnutrition. Collaborate with interdisciplinary team and initiate plan and interventions as ordered.  Monitor patient's weight and dietary intake as ordered or per policy. Utilize nutrition screening tool and intervene as necessary. Determine patient's food preferences and provide high-protein, high-caloric foods as appropriate.     INTERVENTIONS:  - Monitor oral intake, urinary output, labs, and treatment plans  - Assess nutrition and hydration status and recommend course of action  - Evaluate amount of meals eaten  - Assist patient with eating if necessary   - Allow adequate time for meals  - Recommend/ encourage appropriate diets, oral nutritional supplements, and vitamin/mineral supplements  - Order, calculate, and assess calorie counts as needed  - Recommend, monitor, and adjust tube feedings and TPN/PPN based on assessed needs  - Assess need for intravenous fluids  - Provide specific nutrition/hydration education as appropriate  - Include patient/family/caregiver in decisions related to  nutrition  Outcome: Progressing     Problem: Prexisting or High Potential for Compromised Skin Integrity  Goal: Skin integrity is maintained or improved  Description: INTERVENTIONS:  - Identify patients at risk for skin breakdown  - Assess and monitor skin integrity  - Assess and monitor nutrition and hydration status  - Monitor labs   - Assess for incontinence   - Turn and reposition patient  - Assist with mobility/ambulation  - Relieve pressure over bony prominences  - Avoid friction and shearing  - Provide appropriate hygiene as needed including keeping skin clean and dry  - Evaluate need for skin moisturizer/barrier cream  - Collaborate with interdisciplinary team   - Patient/family teaching  - Consider wound care consult   Outcome: Progressing

## 2024-12-16 NOTE — ASSESSMENT & PLAN NOTE
Patient presented to the ED with worsening sob. In the ED, pt was placed on 6L NC. When she would have periods of somnolence, her O2 sats would drop into the 50's. Pt was then placed on BiPAP. Of note, pt does not wear O2 at baseline or CPAP/BiPAP at home. On previous admission, pt reported a hx of DAVID, however she also stated she was not interested in a sleep study.  Lab work was significant for mild leukocytosis, elevated BNP. VB.310/70.7/35.2/34.8. Troponins were negative. Pt was given 80 mg IV Lasix for vascular congestion.  () CXR: noted for vascular congestion  Flu/COVID/RSV negative  MRSA negative  Pulmonology consultation appreciated- Patient qualified for BiPAP based on DAVID/OHS with severe restrictive thoracic disorder. Patient will need NIV in the home. Patient at high risk for admission without NIV therapy  BiPAP to be delivered to the home on the day of discharge per CM  Patient instructed to follow-up with pulmonology and PCP

## 2024-12-16 NOTE — CASE MANAGEMENT
Case Management Discharge Planning Note    Patient name Zee Kirk  Location /-01 MRN [id number]  : 1950 Date 2024       Current Admission Date: 2024  Current Admission Diagnosis:Acute respiratory failure with hypoxia and hypercapnia (Formerly [facility])   Patient Active Problem List    Diagnosis Date Noted Date Diagnosed    Paroxysmal A-fib ([facility]) 2024     History of kidney stones 2024     Skin abnormalities 2024     Cataract, nuclear sclerotic senile, right 2024     Acute on chronic heart failure with preserved ejection fraction (HFpEF) (Former[facility]) 2024     Hypoxic episode 2024     Type 2 diabetes mellitus without complication, without long-term current use of insulin ([facility]) 2024     Long term (current) use of anticoagulants 2024     Subtherapeutic international normalized ratio (INR) 2024     Multiple open wounds 2024     Acute respiratory failure with hypoxia and hypercapnia ([facility]) 2024     Chest discomfort 2024     Left leg pain 2023     Shingles 2023     Cellulitis- Ruled Out 2023     Longstanding persistent atrial fibrillation ([facility]) 2022     History of Clostridioides difficile infection 2022     Hypothyroid 10/03/2020     Mild episode of recurrent major depressive disorder ([facility]) 10/03/2020     Idiopathic chronic 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 10/03/2020     Class 3 severe obesity due to excess calories with serious comorbidity and body mass index (BMI) greater than or equal to 70 in adult ([facility]) 10/03/2020     Acute on chronic diastolic congestive heart failure ([facility]) 10/03/2020     Panniculitis 10/03/2020     Gastroesophageal reflux disease without esophagitis 10/03/2020     Hx MRSA infection 2020     Impaired mobility 2019     Osteoarthritis of glenohumeral joint 2019     Left ovarian cyst 2017     Depression with anxiety 07/15/2017     Anemia 2016      Adjustment disorder 09/05/2013     Irritable bowel syndrome 12/04/2012     Left atrial enlargement 12/04/2012     Left ventricular hypertrophy 12/04/2012     Carpal tunnel syndrome 05/30/2012     Gout 05/30/2012     Hyperlipidemia 05/30/2012     Symptomatic menopausal or female climacteric states 05/30/2012       LOS (days): 5  Geometric Mean LOS (GMLOS) (days): 3.9  Days to GMLOS:-0.7     OBJECTIVE:  Risk of Unplanned Readmission Score: 25.28         Current admission status: Inpatient   Preferred Pharmacy:   [facility] [place] - [address]  [address]  [place] [address]  Phone: [phone] Fax: [phone]    Primary Care Provider: Franklyn Caruso MD    Primary Insurance: MEDICARE  Secondary Insurance: [facility]    DISCHARGE DETAILS:    Discharge planning discussed with:: Patient  Freedom of Choice: Yes  Comments - Freedom of Choice: Discussed FOC  CM contacted family/caregiver?: Yes  Were Treatment Team discharge recommendations reviewed with patient/caregiver?: Yes  Did patient/caregiver verbalize understanding of patient care needs?: Yes  Were patient/caregiver advised of the risks associated with not following Treatment Team discharge recommendations?: Yes    Contacts  Patient Contacts: Fay Cano - caregiver [phone]  Contact Method: Phone  Reason/Outcome: Discharge Planning      CM called [facility] to coordinate dc w/delivery of NIV equipment.  Per [facility] equipment to be delivered between 2p-3p today.  CM notified provider of same.     Notified by provider pt clear for dc today.  CM met w/pt at bedside. Pt has been in contact w/[employer] re: NIV machine.  Pt has been trying to get in touch w/her caregiver to be at the house prior.  Pt attempted to call her CG Fay Cano - no answer, called her other CG - Ebony - no answer.  Pt called the company - explained that she is discharging today - the company [employer] will attempt to reach the caregiver.  This CM attempted to  call pt's caregiver Fay Cano - no answer - left VM. Attempted to call pt's emerg contact - nephew Robbie - no answer.      CM returned to pt room - pt received call from KRYSTEN Ebony, Fay Cano will be going to the home.  CM called CG Fay Cano - confirmed she will attempt to be there by 1230.  CM attempted to change the transportation time to 1300 - received TC from [facility] - d/t staffing arrangements to accommodate pt's needs they are unable to change time to 1300.  There are 6 staff members who will be transporting pt home.  CM notified pt of same.  CM sent secure email to [facility] w/[employer] notifying of transport time.  RT will meet pt at her home to set up her NIV machine.  [facility]  CM notified pt - unable to push transport back d/t availability of transportation company.  Pt verbalized understanding.  Notified by RT pt does not qualify for Home O2 at this time.             Transport at Discharge : [facility]     Number/Name of Dispatcher: [facility] [phone]  Transported by (Company and Unit #): [facility]  ETA of Transport (Date): 12/16/24  ETA of Transport (Time): 1200              IMM Given (Date):: 12/16/24  IMM Given to:: Patient    This CM reviewed IMM w/pt - pt verbalized understanding and in agreement w/dcp. Pt signed IMM form & CM placed in MR bin.

## 2024-12-16 NOTE — ASSESSMENT & PLAN NOTE
Wt Readings from Last 3 Encounters:   12/16/24 (!) 203 kg (448 lb 8 oz)   12/05/24 (!) 193 kg (425 lb)   11/10/24 (!) 193 kg (424 lb 12.8 oz)     See plan for acute on chronic respiratory failure

## 2024-12-16 NOTE — DISCHARGE SUMMARY
Discharge Summary - Hospitalist   Name: Zee Kirk 74 y.o. female I MRN: [id number]  Unit/Bed#: -01 I Date of Admission: 2024   Date of Service: 2024 I Hospital Day: 5     Assessment & Plan  Acute respiratory failure with hypoxia and hypercapnia (HCC)  Patient presented to the ED with worsening sob. In the ED, pt was placed on 6L NC. When she would have periods of somnolence, her O2 sats would drop into the 50's. Pt was then placed on BiPAP. Of note, pt does not wear O2 at baseline or CPAP/BiPAP at home. On previous admission, pt reported a hx of DAVID, however she also stated she was not interested in a sleep study.  Lab work was significant for mild leukocytosis, elevated BNP. VB.310/70.7/35.2/34.8. Troponins were negative. Pt was given 80 mg IV Lasix for vascular congestion.  () CXR: noted for vascular congestion  Flu/COVID/RSV negative  MRSA negative  Pulmonology consultation appreciated- Patient qualified for BiPAP based on DAVID/OHS with severe restrictive thoracic disorder. Patient will need NIV in the home. Patient at high risk for admission without NIV therapy  BiPAP to be delivered to the home on the day of discharge per CM  Patient instructed to follow-up with pulmonology and PCP  Acute on chronic diastolic congestive heart failure (HCC)  Wt Readings from Last 3 Encounters:   24 (!) 203 kg (448 lb 8 oz)   24 (!) 193 kg (425 lb)   11/10/24 (!) 193 kg (424 lb 12.8 oz)     See plan for acute on chronic respiratory failure        Hypothyroid  Continue home synthroid  Class 3 severe obesity due to excess calories with serious comorbidity and body mass index (BMI) greater than or equal to 70 in adult ([facility])  Diet control and lifestyle changes counseled  Impaired mobility  Patient is bed-bound at baseline  Paroxysmal A-fib (HCC)  Patient on coumadin, Coumadin 2 mg M/T/W/F/Sa/Marquez and 4 mg Th   Skin abnormalities  Pt with multiple weeping wounds  Pt uses Kenalog and  Neosporin for wound care at home  According to documentation from most recent hospitalization, pt is well known to wound care team  Continue outpatient follow-up with wound care     Medical Problems       Resolved Problems  Date Reviewed: 12/12/2024          Resolved    Primary osteoarthritis involving multiple joints 12/11/2024     Resolved by  ORACIO Selby    Lymphedema 12/11/2024     Resolved by  ORACIO Selby    Sepsis (HCC) 12/13/2024     Resolved by  Feliciano Cuevas MD        Discharging Physician / Practitioner: Kishan Jones PA-C  PCP: Franklyn Caruso MD  Admission Date:   Admission Orders (From admission, onward)       Ordered        12/11/24 2208  INPATIENT ADMISSION  Once                          Discharge Date: 12/16/24    Consultations During Hospital Stay:  Dermatology    Procedures Performed:   none    Significant Findings / Test Results:   XR chest 1 view portable  Result Date: 12/12/2024  Impression: No acute cardiopulmonary disease. Workstation performed: [id number]       Incidental Findings:   none     Test Results Pending at Discharge (will require follow up):   none     Outpatient Tests Requested:  none    Complications:  none    Reason for Admission: acute on chronic respiratory failure, acute on chronic CHF    Hospital Course:   Zee Kirk is a 74 y.o. female patient who originally presented to the hospital on 12/11/2024 due to shortness of breath. Please see H&P by ORACIO Steel for complete details of presentation. The patient was placed on 6 L O2 and then was noted to have periods of somnolence with O2 dropping into the 50's. She was noted to have respiratory acidosis on VBG and the patient was then placed on BiPAP. She was given IV lasix for vascular congestion noted on CXR. The patient's mental status improved. The patient has severe restrictive thoracic disorder secondary to morbid obesity. The patient also has obesity  "hypoventilation syndrome with low lung volumes and hypercapnia. Given this the patient will need NIV int he home, the patient is at very high risk for admission without NIV therapy. The patient was ordered a BiPAP for home however this was not able to be delivered over the weekend so the patient remained hospitalization. The patient's BiPAP was scheduled for delivery on day of discharge. The patient was instructed to follow-up with pulmonology and PCP. This is a brief discharge summary, please see notes from throughout the patient's hospital stay for complete details of her hospitalization.       Please see above list of diagnoses and related plan for additional information.     Condition at Discharge: fair    Discharge Day Visit / Exam:   Subjective:    Vitals: Blood Pressure: 95/56 (12/16/24 0651)  Pulse: 60 (12/16/24 0651)  Temperature: 97.6 °F (36.4 °C) (12/16/24 0651)  Temp Source: Oral (12/14/24 1524)  Respirations: 18 (12/16/24 0651)  Height: 5' 4\" (162.6 cm) (12/12/24 0100)  Weight - Scale: (!) 203 kg (448 lb 8 oz) (12/16/24 0617)  SpO2: 93 % (12/16/24 1041)  Physical Exam  Vitals and nursing note reviewed.   Constitutional:       Appearance: Normal appearance. She is obese.   HENT:      Head: Normocephalic and atraumatic.      Nose: Nose normal.      Mouth/Throat:      Mouth: Mucous membranes are moist.      Pharynx: Oropharynx is clear.   Cardiovascular:      Rate and Rhythm: Normal rate and regular rhythm.      Heart sounds: Normal heart sounds.   Pulmonary:      Breath sounds: Normal breath sounds.   Abdominal:      General: There is no distension.      Palpations: Abdomen is soft.      Tenderness: There is no abdominal tenderness.   Skin:     General: Skin is warm and dry.   Neurological:      General: No focal deficit present.      Mental Status: She is alert and oriented to person, place, and time.   Psychiatric:         Mood and Affect: Mood normal.         Behavior: Behavior normal.      "     Discussion with Family: Patient declined call to .     Discharge instructions/Information to patient and family:   See after visit summary for information provided to patient and family.      Provisions for Follow-Up Care:  See after visit summary for information related to follow-up care and any pertinent home health orders.      Mobility at time of Discharge:   Basic Mobility Inpatient Raw Score: 6  JH-HLM Goal: 2: Bed activities/Dependent transfer  JH-HLM Achieved: 2: Bed activities/Dependent transfer  HLM Goal achieved. Continue to encourage appropriate mobility.     Disposition:   Home    Planned Readmission: no    Discharge Medications:  See after visit summary for reconciled discharge medications provided to patient and/or family.      Administrative Statements   Discharge Statement:  I have spent a total time of 25 minutes in caring for this patient on the day of the visit/encounter. >30 minutes of time was spent on: Counseling / Coordination of care, Documenting in the medical record, and Reviewing / ordering tests, medicine, procedures  .    **Please Note: This note may have been constructed using a voice recognition system**

## 2024-12-17 LAB
BACTERIA BLD CULT: NORMAL
BACTERIA BLD CULT: NORMAL

## 2024-12-17 NOTE — DISCHARGE SUMMARY
Discharge Summary - Hospitalist   Name: Zee LENZ Javyarmanisheycorrie 74 y.o. female I MRN: [id number]  Unit/Bed#: -01 I Date of Admission: 12/11/2024   Date of Service: 12/17/2024 I Hospital Day: 5        Addendum to discharge summary on 12/16/2024    SIRS- Sepsis ruled out  Patient presented with tachypnea and leukocytosis with no source of infection  Infectious workup was negative  SIRS in the setting of CHF exacerbation   Leukocytosis improved without antibiotics  Tachypnea resolved with treatment of CHF  SIRS resolved prior to discharge

## 2024-12-18 ENCOUNTER — TELEPHONE (OUTPATIENT)
Dept: PULMONOLOGY | Facility: CLINIC | Age: 74
End: 2024-12-18

## 2024-12-18 ENCOUNTER — ANTICOAG VISIT (OUTPATIENT)
Dept: CARDIOLOGY CLINIC | Facility: CLINIC | Age: 74
End: 2024-12-18
Payer: MEDICARE

## 2024-12-18 DIAGNOSIS — I48.11 LONGSTANDING PERSISTENT ATRIAL FIBRILLATION (HCC): Primary | ICD-10-CM

## 2024-12-18 DIAGNOSIS — Z79.01 LONG TERM (CURRENT) USE OF ANTICOAGULANTS: ICD-10-CM

## 2024-12-18 LAB
INR PPP: 1.8
PROTHROMBIN TIME: 20.5 SEC (ref 12–14.6)

## 2024-12-18 PROCEDURE — 93793 ANTICOAG MGMT PT WARFARIN: CPT | Performed by: PHYSICIAN ASSISTANT

## 2024-12-18 NOTE — TELEPHONE ENCOUNTER
----- Message from Davon South MD sent at 12/13/2024 12:55 PM EST -----  Regarding: hospital follow-up  Please make 2 month follow-up with any provider to review BiPAP compliance going home from hospital with BiPAP

## 2024-12-19 NOTE — PATIENT INSTRUCTIONS
INR received from lab and reviewed.    Dose per calendar  Recheck INR in one week-results and instructions given to pt by phone  ORACIO Ty

## 2024-12-24 LAB
INR PPP: 2.6
PROTHROMBIN TIME: 27.1 SEC (ref 12–14.6)

## 2024-12-26 ENCOUNTER — ANTICOAG VISIT (OUTPATIENT)
Dept: CARDIOLOGY CLINIC | Facility: CLINIC | Age: 74
End: 2024-12-26
Payer: MEDICARE

## 2024-12-26 DIAGNOSIS — I48.11 LONGSTANDING PERSISTENT ATRIAL FIBRILLATION (HCC): Primary | ICD-10-CM

## 2024-12-26 DIAGNOSIS — Z79.01 LONG TERM (CURRENT) USE OF ANTICOAGULANTS: ICD-10-CM

## 2024-12-26 PROCEDURE — 93793 ANTICOAG MGMT PT WARFARIN: CPT | Performed by: NURSE PRACTITIONER

## 2024-12-26 NOTE — PATIENT INSTRUCTIONS
INR received from lab and reviewed.    No changes needed; continue current dosing schedule.  Recheck INR in 2 weeks -instructions given to pt by phone  ORACIO Ty

## 2024-12-29 ENCOUNTER — APPOINTMENT (EMERGENCY)
Dept: RADIOLOGY | Facility: HOSPITAL | Age: 74
DRG: 315 | End: 2024-12-29
Payer: MEDICARE

## 2024-12-29 ENCOUNTER — HOSPITAL ENCOUNTER (INPATIENT)
Facility: HOSPITAL | Age: 74
LOS: 2 days | Discharge: HOME | DRG: 315 | End: 2025-01-01
Attending: EMERGENCY MEDICINE | Admitting: INTERNAL MEDICINE
Payer: MEDICARE

## 2024-12-29 DIAGNOSIS — R00.1 BRADYCARDIA: ICD-10-CM

## 2024-12-29 DIAGNOSIS — R06.02 SHORTNESS OF BREATH: ICD-10-CM

## 2024-12-29 DIAGNOSIS — I95.9 HYPOTENSION, UNSPECIFIED HYPOTENSION TYPE: ICD-10-CM

## 2024-12-29 DIAGNOSIS — R53.1 WEAKNESS: Primary | ICD-10-CM

## 2024-12-29 LAB
2HR DELTA HS TROPONIN: -2 NG/L
ALBUMIN SERPL BCG-MCNC: 2.7 G/DL (ref 3.5–5)
ALP SERPL-CCNC: 73 U/L (ref 34–104)
ALT SERPL W P-5'-P-CCNC: 5 U/L (ref 7–52)
ANION GAP SERPL CALCULATED.3IONS-SCNC: 6 MMOL/L (ref 4–13)
ARTERIAL PATENCY WRIST A: YES
AST SERPL W P-5'-P-CCNC: 11 U/L (ref 13–39)
BASE EXCESS BLDA CALC-SCNC: 8 MMOL/L
BASOPHILS # BLD AUTO: 0.05 THOUSANDS/ΜL (ref 0–0.1)
BASOPHILS NFR BLD AUTO: 1 % (ref 0–1)
BILIRUB SERPL-MCNC: 0.65 MG/DL (ref 0.2–1)
BNP SERPL-MCNC: 231 PG/ML (ref 0–100)
BUN SERPL-MCNC: 23 MG/DL (ref 5–25)
CALCIUM ALBUM COR SERPL-MCNC: 9.5 MG/DL (ref 8.3–10.1)
CALCIUM SERPL-MCNC: 8.5 MG/DL (ref 8.4–10.2)
CARDIAC TROPONIN I PNL SERPL HS: 10 NG/L (ref ?–50)
CARDIAC TROPONIN I PNL SERPL HS: 8 NG/L (ref ?–50)
CHLORIDE SERPL-SCNC: 98 MMOL/L (ref 96–108)
CO2 SERPL-SCNC: 34 MMOL/L (ref 21–32)
CREAT SERPL-MCNC: 1.08 MG/DL (ref 0.6–1.3)
EOSINOPHIL # BLD AUTO: 0.39 THOUSAND/ΜL (ref 0–0.61)
EOSINOPHIL NFR BLD AUTO: 4 % (ref 0–6)
ERYTHROCYTE [DISTWIDTH] IN BLOOD BY AUTOMATED COUNT: 17.2 % (ref 11.6–15.1)
FLUAV RNA RESP QL NAA+PROBE: NEGATIVE
FLUBV RNA RESP QL NAA+PROBE: NEGATIVE
GFR SERPL CREATININE-BSD FRML MDRD: 51 ML/MIN/1.73SQ M
GLUCOSE SERPL-MCNC: 113 MG/DL (ref 65–140)
GLUCOSE SERPL-MCNC: 121 MG/DL (ref 65–140)
GLUCOSE SERPL-MCNC: 93 MG/DL (ref 65–140)
HCO3 BLDA-SCNC: 34.9 MMOL/L (ref 22–28)
HCT VFR BLD AUTO: 36.4 % (ref 34.8–46.1)
HGB BLD-MCNC: 10 G/DL (ref 11.5–15.4)
IMM GRANULOCYTES # BLD AUTO: 0.04 THOUSAND/UL (ref 0–0.2)
IMM GRANULOCYTES NFR BLD AUTO: 0 % (ref 0–2)
INR PPP: 2.02 (ref 0.85–1.19)
LYMPHOCYTES # BLD AUTO: 1.01 THOUSANDS/ΜL (ref 0.6–4.47)
LYMPHOCYTES NFR BLD AUTO: 10 % (ref 14–44)
MCH RBC QN AUTO: 24.4 PG (ref 26.8–34.3)
MCHC RBC AUTO-ENTMCNC: 27.5 G/DL (ref 31.4–37.4)
MCV RBC AUTO: 89 FL (ref 82–98)
MONOCYTES # BLD AUTO: 0.44 THOUSAND/ΜL (ref 0.17–1.22)
MONOCYTES NFR BLD AUTO: 5 % (ref 4–12)
NASAL CANNULA: 2
NEUTROPHILS # BLD AUTO: 7.85 THOUSANDS/ΜL (ref 1.85–7.62)
NEUTS SEG NFR BLD AUTO: 80 % (ref 43–75)
NRBC BLD AUTO-RTO: 0 /100 WBCS
O2 CT BLDA-SCNC: 14.1 ML/DL (ref 16–23)
OXYHGB MFR BLDA: 94.7 % (ref 94–97)
PCO2 BLDA: 62.1 MM HG (ref 36–44)
PH BLDA: 7.37 [PH] (ref 7.35–7.45)
PLATELET # BLD AUTO: 500 THOUSANDS/UL (ref 149–390)
PMV BLD AUTO: 8.6 FL (ref 8.9–12.7)
PO2 BLDA: 79.1 MM HG (ref 75–129)
POTASSIUM SERPL-SCNC: 3.6 MMOL/L (ref 3.5–5.3)
PROT SERPL-MCNC: 7.7 G/DL (ref 6.4–8.4)
PROTHROMBIN TIME: 23.2 SECONDS (ref 12.3–15)
RBC # BLD AUTO: 4.1 MILLION/UL (ref 3.81–5.12)
RSV RNA RESP QL NAA+PROBE: NEGATIVE
SARS-COV-2 RNA RESP QL NAA+PROBE: NEGATIVE
SODIUM SERPL-SCNC: 138 MMOL/L (ref 135–147)
SPECIMEN SOURCE: ABNORMAL
WBC # BLD AUTO: 9.78 THOUSAND/UL (ref 4.31–10.16)

## 2024-12-29 PROCEDURE — 71045 X-RAY EXAM CHEST 1 VIEW: CPT

## 2024-12-29 PROCEDURE — 82948 REAGENT STRIP/BLOOD GLUCOSE: CPT

## 2024-12-29 PROCEDURE — 36415 COLL VENOUS BLD VENIPUNCTURE: CPT

## 2024-12-29 PROCEDURE — 99285 EMERGENCY DEPT VISIT HI MDM: CPT | Performed by: EMERGENCY MEDICINE

## 2024-12-29 PROCEDURE — 85025 COMPLETE CBC W/AUTO DIFF WBC: CPT | Performed by: EMERGENCY MEDICINE

## 2024-12-29 PROCEDURE — 36600 WITHDRAWAL OF ARTERIAL BLOOD: CPT

## 2024-12-29 PROCEDURE — 0241U HB NFCT DS VIR RESP RNA 4 TRGT: CPT | Performed by: EMERGENCY MEDICINE

## 2024-12-29 PROCEDURE — 82805 BLOOD GASES W/O2 SATURATION: CPT | Performed by: EMERGENCY MEDICINE

## 2024-12-29 PROCEDURE — 93005 ELECTROCARDIOGRAM TRACING: CPT

## 2024-12-29 PROCEDURE — 99285 EMERGENCY DEPT VISIT HI MDM: CPT

## 2024-12-29 PROCEDURE — 83880 ASSAY OF NATRIURETIC PEPTIDE: CPT | Performed by: EMERGENCY MEDICINE

## 2024-12-29 PROCEDURE — 80053 COMPREHEN METABOLIC PANEL: CPT | Performed by: EMERGENCY MEDICINE

## 2024-12-29 PROCEDURE — 85610 PROTHROMBIN TIME: CPT | Performed by: INTERNAL MEDICINE

## 2024-12-29 PROCEDURE — 99223 1ST HOSP IP/OBS HIGH 75: CPT | Performed by: INTERNAL MEDICINE

## 2024-12-29 PROCEDURE — 84484 ASSAY OF TROPONIN QUANT: CPT | Performed by: EMERGENCY MEDICINE

## 2024-12-29 RX ORDER — ALLOPURINOL 100 MG/1
100 TABLET ORAL DAILY
Status: DISCONTINUED | OUTPATIENT
Start: 2024-12-29 | End: 2025-01-01 | Stop reason: HOSPADM

## 2024-12-29 RX ORDER — PREDNISOLONE ACETATE 10 MG/ML
1 SUSPENSION/ DROPS OPHTHALMIC 2 TIMES DAILY
Status: DISCONTINUED | OUTPATIENT
Start: 2024-12-30 | End: 2025-01-01 | Stop reason: HOSPADM

## 2024-12-29 RX ORDER — SODIUM CHLORIDE, SODIUM GLUCONATE, SODIUM ACETATE, POTASSIUM CHLORIDE, MAGNESIUM CHLORIDE, SODIUM PHOSPHATE, DIBASIC, AND POTASSIUM PHOSPHATE .53; .5; .37; .037; .03; .012; .00082 G/100ML; G/100ML; G/100ML; G/100ML; G/100ML; G/100ML; G/100ML
500 INJECTION, SOLUTION INTRAVENOUS ONCE
Status: COMPLETED | OUTPATIENT
Start: 2024-12-30 | End: 2024-12-30

## 2024-12-29 RX ORDER — INSULIN LISPRO 100 [IU]/ML
4-20 INJECTION, SOLUTION INTRAVENOUS; SUBCUTANEOUS
Status: DISCONTINUED | OUTPATIENT
Start: 2024-12-29 | End: 2024-12-30

## 2024-12-29 RX ORDER — TOBRAMYCIN 3 MG/ML
1 SOLUTION/ DROPS OPHTHALMIC 2 TIMES DAILY
Status: DISCONTINUED | OUTPATIENT
Start: 2024-12-29 | End: 2024-12-30

## 2024-12-29 RX ORDER — PREDNISOLONE ACETATE 10 MG/ML
1 SUSPENSION/ DROPS OPHTHALMIC 4 TIMES DAILY
Status: DISCONTINUED | OUTPATIENT
Start: 2024-12-29 | End: 2024-12-29

## 2024-12-29 RX ORDER — ACETAMINOPHEN 325 MG/1
650 TABLET ORAL EVERY 4 HOURS PRN
Status: DISCONTINUED | OUTPATIENT
Start: 2024-12-29 | End: 2025-01-01 | Stop reason: HOSPADM

## 2024-12-29 RX ORDER — WARFARIN SODIUM 2 MG/1
2 TABLET ORAL
Status: DISCONTINUED | OUTPATIENT
Start: 2024-12-30 | End: 2025-01-01 | Stop reason: HOSPADM

## 2024-12-29 RX ORDER — WARFARIN SODIUM 2 MG/1
4 TABLET ORAL
Status: DISCONTINUED | OUTPATIENT
Start: 2024-12-29 | End: 2025-01-01 | Stop reason: HOSPADM

## 2024-12-29 RX ORDER — TOBRAMYCIN 3 MG/ML
1 SOLUTION/ DROPS OPHTHALMIC 4 TIMES DAILY
Status: DISCONTINUED | OUTPATIENT
Start: 2024-12-29 | End: 2024-12-29

## 2024-12-29 RX ORDER — MIDODRINE HYDROCHLORIDE 5 MG/1
2.5 TABLET ORAL
Status: DISCONTINUED | OUTPATIENT
Start: 2024-12-29 | End: 2024-12-30

## 2024-12-29 RX ORDER — FUROSEMIDE 10 MG/ML
40 INJECTION INTRAMUSCULAR; INTRAVENOUS ONCE
Status: COMPLETED | OUTPATIENT
Start: 2024-12-29 | End: 2024-12-29

## 2024-12-29 RX ORDER — GABAPENTIN 100 MG/1
200 CAPSULE ORAL 3 TIMES DAILY
Status: DISCONTINUED | OUTPATIENT
Start: 2024-12-29 | End: 2025-01-01 | Stop reason: HOSPADM

## 2024-12-29 RX ORDER — WARFARIN SODIUM 2 MG/1
2 TABLET ORAL
Status: DISCONTINUED | OUTPATIENT
Start: 2024-12-29 | End: 2024-12-29

## 2024-12-29 RX ORDER — ONDANSETRON 2 MG/ML
4 INJECTION INTRAMUSCULAR; INTRAVENOUS EVERY 6 HOURS PRN
Status: DISCONTINUED | OUTPATIENT
Start: 2024-12-29 | End: 2025-01-01 | Stop reason: HOSPADM

## 2024-12-29 RX ORDER — MIDODRINE HYDROCHLORIDE 5 MG/1
5 TABLET ORAL ONCE
Status: DISCONTINUED | OUTPATIENT
Start: 2024-12-29 | End: 2024-12-29

## 2024-12-29 RX ADMIN — GABAPENTIN 200 MG: 100 CAPSULE ORAL at 21:29

## 2024-12-29 RX ADMIN — GABAPENTIN 200 MG: 100 CAPSULE ORAL at 17:47

## 2024-12-29 RX ADMIN — WARFARIN SODIUM 4 MG: 2 TABLET ORAL at 17:47

## 2024-12-29 RX ADMIN — TOBRAMYCIN 1 DROP: 3 SOLUTION/ DROPS OPHTHALMIC at 21:29

## 2024-12-29 RX ADMIN — ACETAMINOPHEN 650 MG: 325 TABLET ORAL at 21:29

## 2024-12-29 RX ADMIN — MIDODRINE HYDROCHLORIDE 2.5 MG: 5 TABLET ORAL at 17:47

## 2024-12-29 RX ADMIN — FUROSEMIDE 40 MG: 10 INJECTION, SOLUTION INTRAMUSCULAR; INTRAVENOUS at 17:47

## 2024-12-29 NOTE — ASSESSMENT & PLAN NOTE
Present on admission as evidenced by BMI of 76  Encouraged lifestyle modifications and weight loss

## 2024-12-29 NOTE — H&P
"H&P - Hospitalist   Name: Zee Kirk 74 y.o. female I MRN: [id number]  Unit/Bed#: ED 17 I Date of Admission: 12/29/2024   Date of Service: 12/29/2024 I Hospital Day: 0     Assessment & Plan  Weakness  Presents with generalized weakness of unclear etiology  Likely secondary to morbid obesity and patient being bedbound  PT/OT evaluation and treatment  Case management consultation for safe disposition planning  Midodrine for hypotension as this may be contributing to symptomatology  Paroxysmal A-fib (HCC)  Rate controlled not on AV kiko blockade  Continue home Coumadin 2 mg daily aside from 4 mg on Thursdays  Check PT/INR  Monitor heart rates  Type 2 diabetes mellitus without complication, without long-term current use of insulin (HCC)  Lab Results   Component Value Date    HGBA1C 7.2 (H) 11/06/2024       No results for input(s): \"POCGLU\" in the last 72 hours.    Blood Sugar Average: Last 72 hrs:    Well-controlled on outpatient oral regimen ; hold while inpatient  Sliding-scale insulin with Accu-Cheks  Diabetic diet  Target blood glucose 140-180  Hypothyroid  Continue home levothyroxine  Class 3 severe obesity due to excess calories with serious comorbidity and body mass index (BMI) greater than or equal to 70 in adult (HCC)  Present on admission as evidenced by BMI of 76  Encouraged lifestyle modifications and weight loss    VTE Pharmacologic Prophylaxis: VTE Score: 5 High Risk (Score >/= 5) - Pharmacological DVT Prophylaxis Ordered: warfarin (Coumadin). Sequential Compression Devices Ordered.  Code Status: Level 1 - Full Code  Discussion with family: Patient declined call to .     Anticipated Length of Stay: Patient will be admitted on an observation basis with an anticipated length of stay of less than 2 midnights secondary to generalized weakness.    History of Present Illness   Chief Complaint: Generalized weakness    Zee Kirk is a 74 y.o. female with a PMH of morbid " obesity, paroxysmal atrial fibrillation on Coumadin, hypothyroidism, type 2 diabetes mellitus who presents with generalized weakness.  The patient is a frequent flyer to this hospital.  She has caregivers at home.  The patient endorses generalized weakness which is very similar to her numerous presentations to the ED.  She is hemodynamically stable and saturating well on room air.  Laboratory analysis unremarkable.  ED provider does not feel comfortable discharging the patient from the emergency room thus the patient will be assigned to observation with hopeful discharge on 12/30/2024 to home with caregivers as the patient is medically stable at this time.    Review of Systems   Constitutional:  Negative for chills and fever.   HENT:  Negative for ear pain and sore throat.    Eyes:  Negative for pain and visual disturbance.   Respiratory:  Negative for cough and shortness of breath.    Cardiovascular:  Negative for chest pain and palpitations.   Gastrointestinal:  Negative for abdominal pain and vomiting.   Genitourinary:  Negative for dysuria and hematuria.   Musculoskeletal:  Negative for arthralgias and back pain.   Skin:  Negative for color change and rash.   Neurological:  Positive for weakness. Negative for seizures and syncope.   All other systems reviewed and are negative.      Historical Information   Past Medical History:   Diagnosis Date    Atrial fibrillation ([facility])     Bladder stone     C. difficile colitis     Cataract, nuclear sclerotic senile, right 12/05/2024    Cellulitis     CHF (congestive heart failure) ([facility])     Depression with anxiety     Disease of thyroid gland     Diverticulitis     Enterocolitis     History of abnormal cervical Pap smear     Kidney stone     Lymphedema     Menopause     Age 50    Morbid obesity with BMI of 70 and over, adult ([facility])     MRSA (methicillin resistant Staphylococcus aureus)     abdominal wound    Osteoarthritis     Phlebitis     left lower leg    Sleep apnea      Spondylolysis      Past Surgical History:   Procedure Laterality Date    APPENDECTOMY      CARPAL TUNNEL RELEASE      CATARACT EXTRACTION Right     CHOLECYSTECTOMY      CYSTOSCOPY      stent    EGD      FOOT SURGERY      bone spur    KNEE SURGERY      HI ICAPSULAR CATARACT XTRJ INSJ IO LENS PRSTH 1 STG Right 2024    Procedure: CATARACT EXTRACTION WITH IOL IMPLANTATION;  Surgeon: Chantel Llanos MD;  Location: [facility];  Service: Ophthalmology    WISDOM TOOTH EXTRACTION       Social History     Tobacco Use    Smoking status: Former     Current packs/day: 0.00     Average packs/day: 5.0 packs/day for 3.0 years (15.0 ttl pk-yrs)     Types: Cigarettes     Start date: 1967     Quit date: 1970     Years since quittin.5    Smokeless tobacco: Never    Tobacco comments:     5 packs/day until  (age [age])    Vaping Use    Vaping status: Never Used   Substance and Sexual Activity    Alcohol use: Not Currently    Drug use: Not Currently     Types: Marijuana     Comment: As a teen - As per [employer]     Sexual activity: Not Currently     E-Cigarette/Vaping    E-Cigarette Use Never User      E-Cigarette/Vaping Substances    Nicotine No     THC No     CBD No     Flavoring No     Other No     Unknown No      Meds/Allergies   I have reviewed home medications using recent Epic encounter.  Prior to Admission medications    Medication Sig Start Date End Date Taking? Authorizing Provider   acetaminophen (TYLENOL) 650 mg CR tablet Take 650 mg by mouth every 8 (eight) hours as needed (for osteoarthritis)    Historical Provider, MD   Alcohol Swabs 70 % PADS May substitute brand based on insurance coverage. Check glucose BID. 24   Roxane Romero PA-C   allopurinol (ZYLOPRIM) 100 mg tablet Take 1 tablet (100 mg total) by mouth daily 19   Davon Lopez DO   benzonatate (TESSALON PERLES) 100 mg capsule Take 1 capsule (100 mg total) by mouth every 8 (eight) hours 24   Elba Pena PA-C   Blood  Glucose Monitoring Suppl (OneTouch Verio Reflect) w/Device KIT May substitute brand based on insurance coverage. Check glucose BID. 11/8/24   Roxane Romero PA-C   furosemide (LASIX) 40 mg tablet Take 1.5 tablets (60 mg total) by mouth daily  Patient taking differently: Take 60 mg by mouth 2 (two) times a day 6/4/24 12/5/24  Leticia Nagel MD   gabapentin (NEURONTIN) 100 mg capsule Take 1 capsule (100 mg total) by mouth 3 (three) times a day  Patient taking differently: Take 200 mg by mouth 3 (three) times a day 8/10/23 12/5/24  ORACIO Last   glucose blood (OneTouch Verio) test strip May substitute brand based on insurance coverage. Check glucose BID. 11/8/24   Roxane Romero PA-C   levothyroxine 125 mcg tablet Take 137 mcg by mouth daily    Historical Provider, MD   midodrine (PROAMATINE) 2.5 mg tablet Take 1 tablet (2.5 mg total) by mouth 3 (three) times a day before meals 8/28/23 12/5/24  ORACIO Gardner   neomycin-bacitracin-polymyxin (NEOSPORIN) 5-400-5,000 ointment Apply topically in the morning Topically to wounds    Historical Provider, MD   OneTouch Delica Lancets 33G MISC May substitute brand based on insurance coverage. Check glucose BID. 11/8/24   Roxane Romero PA-C   prednisoLONE acetate (PRED FORTE) 1 % ophthalmic suspension Administer 1 drop to the right eye 4 (four) times a day 11/25/24   Historical Provider, MD   tobramycin (TOBREX) 0.3 % SOLN Administer 1 drop to the right eye 4 times a day 11/25/24   Historical Provider, MD   triamcinolone (KENALOG) 0.5 % cream Apply 1 Application topically as needed for rash or irritation    Historical Provider, MD   warfarin (Coumadin) 5 mg tablet Take 1 tablet (5 mg total) by mouth daily  Patient taking differently: Take 5 mg by mouth daily 2 mg M,T,W,F SAT, SUN  4 mg TH, 5/28/24 12/5/24  Amaris Cano,      Allergies   Allergen Reactions    Augmentin Es-600 [Amoxicillin-Pot Clavulanate] Anaphylaxis and Rash    Bactrim  [Sulfamethoxazole-Trimethoprim] Anaphylaxis    Cefazolin Itching and Other (See Comments)     Patient developed itching and difficulty swallowing following IV cefazolin administration at [facility] 7/19/20 which required treatment with benadryl and epinephrine - has tolerated other cephalosporins with different side chains including cefepime, cefuroxime, and cephalexin    Ciprofloxacin Anaphylaxis    Doxycycline Anaphylaxis    Keflex [Cephalexin] Anaphylaxis    Penicillins Anaphylaxis, Rash and Other (See Comments)     Tolerates cefepime (11/18/21)    Other Rash, Irritability and Edema     Patient reports significant reaction to the use of Max Orb in the abdominal folds leading to swelling, excoriation, maceration and weeping of the skin.     Omeprazole GI Intolerance    Pork-Derived Products - Food Allergy Diarrhea    Sulfa Antibiotics Hives    Latex Rash and Edema    Vitamin C [Ascorbate - Food Allergy] Rash       Objective :  Temp:  [97 °F (36.1 °C)] 97 °F (36.1 °C)  HR:  [50-55] 51  BP: ()/(54-58) 97/54  Resp:  [20] 20  SpO2:  [94 %-98 %] 94 %  O2 Device: Nasal cannula  Nasal Cannula O2 Flow Rate (L/min):  [4 L/min] 4 L/min    Physical Exam  Vitals and nursing note reviewed.   Constitutional:       General: She is not in acute distress.     Appearance: She is well-developed. She is obese.   HENT:      Head: Normocephalic and atraumatic.   Eyes:      Conjunctiva/sclera: Conjunctivae normal.   Cardiovascular:      Rate and Rhythm: Normal rate and regular rhythm.      Heart sounds: No murmur heard.  Pulmonary:      Effort: Pulmonary effort is normal. No respiratory distress.      Breath sounds: Normal breath sounds.   Abdominal:      Palpations: Abdomen is soft.      Tenderness: There is no abdominal tenderness.   Musculoskeletal:         General: No swelling.      Cervical back: Neck supple.   Skin:     General: Skin is warm and dry.      Capillary Refill: Capillary refill takes less than 2 seconds.    Neurological:      Mental Status: She is alert.   Psychiatric:         Mood and Affect: Mood normal.          Lab Results: I have reviewed the following results:  Results from last 7 days   Lab Units 12/29/24  1211   WBC Thousand/uL 9.78   HEMOGLOBIN g/dL 10.0*   HEMATOCRIT % 36.4   PLATELETS Thousands/uL 500*   SEGS PCT % 80*   LYMPHO PCT % 10*   MONO PCT % 5   EOS PCT % 4     Results from last 7 days   Lab Units 12/29/24  1211   SODIUM mmol/L 138   POTASSIUM mmol/L 3.6   CHLORIDE mmol/L 98   CO2 mmol/L 34*   BUN mg/dL 23   CREATININE mg/dL 1.08   ANION GAP mmol/L 6   CALCIUM mg/dL 8.5   ALBUMIN g/dL 2.7*   TOTAL BILIRUBIN mg/dL 0.65   ALK PHOS U/L 73   ALT U/L 5*   AST U/L 11*   GLUCOSE RANDOM mg/dL 113     Results from last 7 days   Lab Units 12/24/24  1030   INR  2.6         Lab Results   Component Value Date    HGBA1C 7.2 (H) 11/06/2024    HGBA1C 6.9 (H) 02/29/2024    HGBA1C 7.1 (H) 08/30/2021           Imaging Results Review: No pertinent imaging studies reviewed.  Other Study Results Review: No additional pertinent studies reviewed.    Administrative Statements   I have spent a total time of 65 minutes in caring for this patient on the day of the visit/encounter including Diagnostic results, Prognosis, Risks and benefits of tx options, Instructions for management, Patient and family education, Importance of tx compliance, Risk factor reductions, Impressions, Counseling / Coordination of care, Documenting in the medical record, Reviewing / ordering tests, medicine, procedures  , Obtaining or reviewing history  , and Communicating with other healthcare professionals .    ** Please Note: This note has been constructed using a voice recognition system. **

## 2024-12-29 NOTE — ASSESSMENT & PLAN NOTE
Presents with generalized weakness of unclear etiology  Likely secondary to morbid obesity and patient being bedbound  PT/OT evaluation and treatment  Case management consultation for safe disposition planning  Midodrine for hypotension as this may be contributing to symptomatology

## 2024-12-29 NOTE — ASSESSMENT & PLAN NOTE
"Lab Results   Component Value Date    HGBA1C 7.2 (H) 11/06/2024       No results for input(s): \"POCGLU\" in the last 72 hours.    Blood Sugar Average: Last 72 hrs:    Well-controlled on outpatient oral regimen ; hold while inpatient  Sliding-scale insulin with Accu-Cheks  Diabetic diet  Target blood glucose 140-180  "

## 2024-12-29 NOTE — ED PROVIDER NOTES
Time reflects when diagnosis was documented in both MDM as applicable and the Disposition within this note       Time User Action Codes Description Comment    12/29/2024  2:23 PM Thiago Murdock Add [R53.1] Weakness     12/29/2024  3:05 PM Taqueria Ohara Add [R06.02] Shortness of breath     12/29/2024  3:05 PM Taqueria Ohara Add [R00.1] Bradycardia           ED Disposition       ED Disposition   Admit    Condition   Stable    Date/Time   Sun Dec 29, 2024  3:05 PM    Comment   Case was discussed with Amaris Cano and the patient's admission status was agreed to be Admission Status: observation status to the service of Dr. Cano.               Assessment & Plan       Medical Decision Making  74-year-old female presents emergency department roughly 2 weeks after previous admission.  Patient notes that she is getting more swelling in her legs with attention to the dorsum of the right foot.  The patient also notes she is not urinating as much as she normally does and feels weak and fatigued.  The patient denies any trauma fever or chills.  Differential diagnosis based on my evaluation is congestive heart failure, worsening hypercapnic respiratory failure as she has had this recently in the past.   Patient has no signs of vascular insufficiency as pulses are palpable in the dorsalis pedis posterior tibial pulses are 1/4 bilaterally.  There is pitting edema bilaterally at +2.  Patient however is having recurrent bouts of bradycardia with heart rates that have been in the mid 30s.  Although patient tends to have a more bradycardic rhythm in the 50s to 70s, she is not normally in the 30s.  Due to these findings, I would recommend having her observed further the emergency department as her bradycardia may be playing a role in her symptoms.  Case discussed with internal medicine service at [facility] who will observe the patient in the hospital.    Amount and/or Complexity of Data Reviewed  Labs: ordered. Decision-making  details documented in ED Course.  Radiology: ordered and independent interpretation performed.    Risk  Decision regarding hospitalization.        ED Course as of 12/29/24 1837   Sun Dec 29, 2024   1242 BNP(!): 231   1243 Patient having intermittent heart rates in the low 30s with A-fib.   1257 hs TnI 0hr: 10       Medications   allopurinol (ZYLOPRIM) tablet 100 mg (has no administration in time range)   gabapentin (NEURONTIN) capsule 200 mg (has no administration in time range)   levothyroxine tablet 137 mcg (has no administration in time range)   midodrine (PROAMATINE) tablet 2.5 mg (has no administration in time range)   acetaminophen (TYLENOL) tablet 650 mg (has no administration in time range)   ondansetron (ZOFRAN) injection 4 mg (has no administration in time range)   insulin lispro (HumALOG/ADMELOG) 100 units/mL subcutaneous injection 4-20 Units (has no administration in time range)   insulin lispro (HumALOG/ADMELOG) 100 units/mL subcutaneous injection 4-20 Units (has no administration in time range)       ED Risk Strat Scores                          SBIRT 20yo+      Flowsheet Row Most Recent Value   Initial Alcohol Screen: US AUDIT-C     1. How often do you have a drink containing alcohol? 0 Filed at: 12/29/2024 1159   2. How many drinks containing alcohol do you have on a typical day you are drinking?  0 Filed at: 12/29/2024 1159   3a. Male UNDER 65: How often do you have five or more drinks on one occasion? 0 Filed at: 12/29/2024 1159   3b. FEMALE Any Age, or MALE 65+: How often do you have 4 or more drinks on one occassion? 0 Filed at: 12/29/2024 1159   Audit-C Score 0 Filed at: 12/29/2024 1159   JYOTI: How many times in the past year have you...    Used an illegal drug or used a prescription medication for non-medical reasons? Never Filed at: 12/29/2024 1159                            History of Present Illness       Chief Complaint   Patient presents with    Leg Swelling       Past Medical History:    Diagnosis Date    Atrial fibrillation (HCC)     Bladder stone     C. difficile colitis     Cataract, nuclear sclerotic senile, right 2024    Cellulitis     CHF (congestive heart failure) (HCC)     Depression with anxiety     Disease of thyroid gland     Diverticulitis     Enterocolitis     History of abnormal cervical Pap smear     Kidney stone     Lymphedema     Menopause     Age 50    Morbid obesity with BMI of 70 and over, adult (HCC)     MRSA (methicillin resistant Staphylococcus aureus)     abdominal wound    Osteoarthritis     Phlebitis     left lower leg    Sleep apnea     Spondylolysis       Past Surgical History:   Procedure Laterality Date    APPENDECTOMY      CARPAL TUNNEL RELEASE      CATARACT EXTRACTION Right     CHOLECYSTECTOMY      CYSTOSCOPY      stent    EGD      FOOT SURGERY      bone spur    KNEE SURGERY      VT ICAPSULAR CATARACT XTRJ INSJ IO LENS PRSTH 1 STG Right 2024    Procedure: CATARACT EXTRACTION WITH IOL IMPLANTATION;  Surgeon: Chantel Llanos MD;  Location: [facility];  Service: Ophthalmology    WISDOM TOOTH EXTRACTION        Family History   Problem Relation Age of Onset    Heart failure Mother     Heart disease Mother     Lymphoma Mother     Seizures Mother     Kidney disease Father     Heart disease Father     Heart failure Father     Diabetes type II Father     Diabetes type II Sister     Diabetes type II Brother     Uterine cancer Paternal Aunt     Breast cancer Neg Hx     Colon cancer Neg Hx     Ovarian cancer Neg Hx       Social History     Tobacco Use    Smoking status: Former     Current packs/day: 0.00     Average packs/day: 5.0 packs/day for 3.0 years (15.0 ttl pk-yrs)     Types: Cigarettes     Start date: 1967     Quit date: 1970     Years since quittin.5    Smokeless tobacco: Never    Tobacco comments:     5 packs/day until 1970 (age [age])    Vaping Use    Vaping status: Never Used   Substance Use Topics    Alcohol use: Not Currently    Drug  use: Not Currently     Types: Marijuana     Comment: As a teen - As per [employer]       E-Cigarette/Vaping    E-Cigarette Use Never User       E-Cigarette/Vaping Substances    Nicotine No     THC No     CBD No     Flavoring No     Other No     Unknown No       I have reviewed and agree with the history as documented.     74-year-old female presents emergency department complaining of leg swelling and generalized weakness.  Patient notes that she has had a recent admission for hypercapnic respiratory failure.  The patient notes that she left the ICU and was doing well until she noted on Friday, that she was feeling like she was not urinating as much is normal.  The patient states that she is normally on diuretics for congestive heart failure and feels a little short of breath.  The patient denies fever or chills.  Patient called 911 to come to the emergency department for evaluation.  Patient denies fever chills or abdominal pain.        Review of Systems   Constitutional:  Positive for activity change. Negative for chills and fever.   HENT:  Negative for ear pain and sore throat.    Eyes:  Negative for pain and visual disturbance.   Respiratory:  Negative for cough and shortness of breath.    Cardiovascular:  Positive for leg swelling. Negative for chest pain and palpitations.   Gastrointestinal:  Negative for abdominal pain and vomiting.   Genitourinary:  Negative for dysuria and hematuria.   Musculoskeletal:  Negative for arthralgias and back pain.   Skin:  Negative for color change and rash.   Neurological:  Positive for weakness. Negative for seizures and syncope.   All other systems reviewed and are negative.          Objective       ED Triage Vitals   Temperature Pulse Blood Pressure Respirations SpO2 Patient Position - Orthostatic VS   12/29/24 1200 12/29/24 1200 12/29/24 1200 12/29/24 1200 12/29/24 1200 --   (!) 97 °F (36.1 °C) (!) 50 126/56 20 97 %       Temp Source Heart Rate Source BP Location FiO2 (%)  Pain Score    12/29/24 1200 12/29/24 1200 12/29/24 1200 -- 12/29/24 1517    Temporal Monitor Left arm  6      Vitals      Date and Time Temp Pulse SpO2 Resp BP Pain Score FACES Pain Rating User   12/29/24 1517 -- -- -- -- -- 6 -- ND   12/29/24 1517 98.2 °F (36.8 °C) 47 99 % -- 101/55 -- -- DII   12/29/24 1400 -- 51 94 % -- 97/54 -- -- TW   12/29/24 1315 -- 55 98 % -- 120/58 -- -- TW   12/29/24 1200 97 °F (36.1 °C) 50 97 % 20 126/56 -- -- DK            Physical Exam  Vitals and nursing note reviewed.   Constitutional:       General: She is not in acute distress.     Appearance: Normal appearance. She is well-developed. She is ill-appearing.   HENT:      Head: Normocephalic and atraumatic.      Right Ear: External ear normal.      Left Ear: External ear normal.      Nose: Nose normal.      Mouth/Throat:      Mouth: Mucous membranes are moist.   Eyes:      Conjunctiva/sclera: Conjunctivae normal.   Cardiovascular:      Rate and Rhythm: Regular rhythm. Bradycardia present.      Pulses: Normal pulses.      Heart sounds: Normal heart sounds. No murmur heard.     Comments: Heart rate 50-60.  Pulmonary:      Effort: Pulmonary effort is normal. No respiratory distress.      Breath sounds: Normal breath sounds.   Abdominal:      General: Bowel sounds are normal.      Palpations: Abdomen is soft.      Tenderness: There is no abdominal tenderness. There is no guarding or rebound.   Musculoskeletal:         General: Swelling present. No deformity.      Cervical back: Neck supple.      Right lower leg: Edema present.      Left lower leg: Edema present.   Skin:     General: Skin is warm and dry.      Capillary Refill: Capillary refill takes less than 2 seconds.      Coloration: Skin is pale.   Neurological:      General: No focal deficit present.      Mental Status: She is alert and oriented to person, place, and time. Mental status is at baseline.      Motor: Weakness present.         Results Reviewed       Procedure Component  Value Units Date/Time    Protime-INR [[id number]]  (Abnormal) Collected: 12/29/24 1211    Lab Status: Final result Specimen: Blood from Arm, Right Updated: 12/29/24 1637     Protime 23.2 seconds      INR 2.02    Narrative:      INR Therapeutic Range    Indication                                             INR Range      Atrial Fibrillation                                               2.0-3.0  Hypercoagulable State                                    2.0.2.3  Left Ventricular Asist Device                            2.0-3.0  Mechanical Heart Valve                                  -    Aortic(with afib, MI, embolism, HF, LA enlargement,    and/or coagulopathy)                                     2.0-3.0 (2.5-3.5)     Mitral                                                             2.5-3.5  Prosthetic/Bioprosthetic Heart Valve               2.0-3.0  Venous thromboembolism (VTE: VT, PE        2.0-3.0    HS Troponin I 2hr [[id number]]  (Normal) Collected: 12/29/24 1346    Lab Status: Final result Specimen: Blood from Arm, Right Updated: 12/29/24 1419     hs TnI 2hr 8 ng/L      Delta 2hr hsTnI -2 ng/L     Blood gas, arterial [[id number]]  (Abnormal) Collected: 12/29/24 1328    Lab Status: Final result Specimen: Blood, Arterial from Radial, Right Updated: 12/29/24 1337     pH, Arterial 7.368     pCO2, Arterial 62.1 mm Hg      pO2, Arterial 79.1 mm Hg      HCO3, Arterial 34.9 mmol/L      Base Excess, Arterial 8.0 mmol/L      O2 Content, Arterial 14.1 mL/dL      O2 HGB,Arterial  94.7 %      SOURCE Radial, Right     KIP TEST Yes     Nasal Cannula 2    FLU/RSV/COVID - if FLU/RSV clinically relevant (2hr TAT) [[id number]]  (Normal) Collected: 12/29/24 1218    Lab Status: Final result Specimen: Nares from Nose Updated: 12/29/24 1311     SARS-CoV-2 Negative     INFLUENZA A PCR Negative     INFLUENZA B PCR Negative     RSV PCR Negative    Narrative:      This test has been performed using the CoV-2/Flu/RSV plus assay on the  [employer] GeneXpert platform. This test has been validated by the  and verified by the performing laboratory.     This test is designed to amplify and detect the following: nucleocapsid (N), envelope (E), and RNA-dependent RNA polymerase (RdRP) genes of the SARS-CoV-2 genome; matrix (M), basic polymerase (PB2), and acidic protein (PA) segments of the influenza A genome; matrix (M) and non-structural protein (NS) segments of the influenza B genome, and the nucleocapsid genes of RSV A and RSV B.     Positive results are indicative of the presence of Flu A, Flu B, RSV, and/or SARS-CoV-2 RNA. Positive results for SARS-CoV-2 or suspected novel influenza should be reported to state, local, or federal health departments according to local reporting requirements.      All results should be assessed in conjunction with clinical presentation and other laboratory markers for clinical management.     FOR PEDIATRIC PATIENTS - copy/paste COVID Guidelines URL to browser: [email or web address]       HS Troponin 0hr (reflex protocol) [[id number]]  (Normal) Collected: 12/29/24 1211    Lab Status: Final result Specimen: Blood from Arm, Right Updated: 12/29/24 1244     hs TnI 0hr 10 ng/L     B-Type Natriuretic Peptide(BNP) [[id number]]  (Abnormal) Collected: 12/29/24 1211    Lab Status: Final result Specimen: Blood from Arm, Right Updated: 12/29/24 1242      pg/mL     Comprehensive metabolic panel [[id number]]  (Abnormal) Collected: 12/29/24 1211    Lab Status: Final result Specimen: Blood from Arm, Right Updated: 12/29/24 1238     Sodium 138 mmol/L      Potassium 3.6 mmol/L      Chloride 98 mmol/L      CO2 34 mmol/L      ANION GAP 6 mmol/L      BUN 23 mg/dL      Creatinine 1.08 mg/dL      Glucose 113 mg/dL      Calcium 8.5 mg/dL      Corrected Calcium 9.5 mg/dL      AST 11 U/L      ALT 5 U/L      Alkaline Phosphatase 73 U/L      Total Protein 7.7 g/dL      Albumin 2.7  g/dL      Total Bilirubin 0.65 mg/dL      eGFR 51 ml/min/1.73sq m     Narrative:      National Kidney Disease Foundation guidelines for Chronic Kidney Disease (CKD):     Stage 1 with normal or high GFR (GFR > 90 mL/min/1.73 square meters)    Stage 2 Mild CKD (GFR = 60-89 mL/min/1.73 square meters)    Stage 3A Moderate CKD (GFR = 45-59 mL/min/1.73 square meters)    Stage 3B Moderate CKD (GFR = 30-44 mL/min/1.73 square meters)    Stage 4 Severe CKD (GFR = 15-29 mL/min/1.73 square meters)    Stage 5 End Stage CKD (GFR <15 mL/min/1.73 square meters)  Note: GFR calculation is accurate only with a steady state creatinine    CBC and differential [[id number]]  (Abnormal) Collected: 12/29/24 1211    Lab Status: Final result Specimen: Blood from Arm, Right Updated: 12/29/24 1238     WBC 9.78 Thousand/uL      RBC 4.10 Million/uL      Hemoglobin 10.0 g/dL      Hematocrit 36.4 %      MCV 89 fL      MCH 24.4 pg      MCHC 27.5 g/dL      RDW 17.2 %      MPV 8.6 fL      Platelets 500 Thousands/uL      nRBC 0 /100 WBCs      Segmented % 80 %      Immature Grans % 0 %      Lymphocytes % 10 %      Monocytes % 5 %      Eosinophils Relative 4 %      Basophils Relative 1 %      Absolute Neutrophils 7.85 Thousands/µL      Absolute Immature Grans 0.04 Thousand/uL      Absolute Lymphocytes 1.01 Thousands/µL      Absolute Monocytes 0.44 Thousand/µL      Eosinophils Absolute 0.39 Thousand/µL      Basophils Absolute 0.05 Thousands/µL             XR chest 1 view portable   ED Interpretation by Taqueria Ohara Jr., DO (12/29 1229)   Cardiomegaly.  No significant pulmonary vascular congestion changes when compared to prior x-ray done 2 weeks earlier.      Final Interpretation by Blanca Garza MD (12/29 2069)      No acute cardiopulmonary disease.            Workstation performed: [id number]             Procedures    ED Medication and Procedure Management   Prior to Admission Medications   Prescriptions Last Dose Informant Patient Reported?  Taking?   Alcohol Swabs 70 % PADS   No No   Sig: May substitute brand based on insurance coverage. Check glucose BID.   Blood Glucose Monitoring Suppl (OneTouch Verio Reflect) w/Device KIT   No No   Sig: May substitute brand based on insurance coverage. Check glucose BID.   OneTouch Delica Lancets 33G MISC   No No   Sig: May substitute brand based on insurance coverage. Check glucose BID.   acetaminophen (TYLENOL) 650 mg CR tablet 12/28/2024 Evening Self Yes Yes   Sig: Take 650 mg by mouth every 8 (eight) hours as needed (for osteoarthritis)   allopurinol (ZYLOPRIM) 100 mg tablet 12/29/2024 Self No Yes   Sig: Take 1 tablet (100 mg total) by mouth daily   benzonatate (TESSALON PERLES) 100 mg capsule Not Taking  No No   Sig: Take 1 capsule (100 mg total) by mouth every 8 (eight) hours   Patient not taking: Reported on 12/29/2024   furosemide (LASIX) 40 mg tablet 12/29/2024  No Yes   Sig: Take 1.5 tablets (60 mg total) by mouth daily   Patient taking differently: Take 60 mg by mouth 2 (two) times a day   gabapentin (NEURONTIN) 100 mg capsule 12/29/2024  No Yes   Sig: Take 1 capsule (100 mg total) by mouth 3 (three) times a day   Patient taking differently: Take 200 mg by mouth 3 (three) times a day   glucose blood (OneTouch Verio) test strip   No No   Sig: May substitute brand based on insurance coverage. Check glucose BID.   levothyroxine 125 mcg tablet 12/29/2024 Self Yes Yes   Sig: Take 137 mcg by mouth daily   midodrine (PROAMATINE) 2.5 mg tablet 12/29/2024  No Yes   Sig: Take 1 tablet (2.5 mg total) by mouth 3 (three) times a day before meals   neomycin-bacitracin-polymyxin (NEOSPORIN) 5-400-5,000 ointment 12/29/2024  Yes Yes   Sig: Apply topically in the morning Topically to wounds   prednisoLONE acetate (PRED FORTE) 1 % ophthalmic suspension 12/29/2024  Yes Yes   Sig: Administer 2 drops to the right eye 2 (two) times a day   tobramycin (TOBREX) 0.3 % SOLN   Yes No   Sig: Administer 1 drop to the right eye 4  times a day   triamcinolone (KENALOG) 0.5 % cream 12/29/2024  Yes Yes   Sig: Apply 1 Application topically as needed for rash or irritation   warfarin (Coumadin) 5 mg tablet 12/28/2024  No Yes   Sig: Take 1 tablet (5 mg total) by mouth daily   Patient taking differently: Take 5 mg by mouth daily 2 mg M,T,W,F SAT, SUN  4 mg TH (12/29, 1/2, 1/5)      Facility-Administered Medications: None     Current Discharge Medication List        CONTINUE these medications which have NOT CHANGED    Details   acetaminophen (TYLENOL) 650 mg CR tablet Take 650 mg by mouth every 8 (eight) hours as needed (for osteoarthritis)      allopurinol (ZYLOPRIM) 100 mg tablet Take 1 tablet (100 mg total) by mouth daily  Qty: 30 tablet, Refills: 5    Associated Diagnoses: Chronic gout without tophus, unspecified cause, unspecified site      furosemide (LASIX) 40 mg tablet Take 1.5 tablets (60 mg total) by mouth daily    Associated Diagnoses: Chronic diastolic congestive heart failure (HCC)      gabapentin (NEURONTIN) 100 mg capsule Take 1 capsule (100 mg total) by mouth 3 (three) times a day  Qty: 90 capsule, Refills: 0    Associated Diagnoses: Herpes zoster without complication      levothyroxine 125 mcg tablet Take 137 mcg by mouth daily      midodrine (PROAMATINE) 2.5 mg tablet Take 1 tablet (2.5 mg total) by mouth 3 (three) times a day before meals  Qty: 90 tablet, Refills: 0    Associated Diagnoses: Chronic diastolic congestive heart failure (HCC)      neomycin-bacitracin-polymyxin (NEOSPORIN) 5-400-5,000 ointment Apply topically in the morning Topically to wounds      prednisoLONE acetate (PRED FORTE) 1 % ophthalmic suspension Administer 2 drops to the right eye 2 (two) times a day      triamcinolone (KENALOG) 0.5 % cream Apply 1 Application topically as needed for rash or irritation      warfarin (Coumadin) 5 mg tablet Take 1 tablet (5 mg total) by mouth daily  Qty: 30 tablet, Refills: 0    Associated Diagnoses: Longstanding persistent  atrial fibrillation (HCC)      Alcohol Swabs 70 % PADS May substitute brand based on insurance coverage. Check glucose BID.  Qty: 100 each, Refills: 0    Associated Diagnoses: Type 2 diabetes mellitus without complication, without long-term current use of insulin ([facility])      benzonatate (TESSALON PERLES) 100 mg capsule Take 1 capsule (100 mg total) by mouth every 8 (eight) hours  Qty: 21 capsule, Refills: 0    Associated Diagnoses: Cough      Blood Glucose Monitoring Suppl (OneTouch Verio Reflect) w/Device KIT May substitute brand based on insurance coverage. Check glucose BID.  Qty: 1 kit, Refills: 0    Associated Diagnoses: Type 2 diabetes mellitus without complication, without long-term current use of insulin ([facility])      glucose blood (OneTouch Verio) test strip May substitute brand based on insurance coverage. Check glucose BID.  Qty: 100 each, Refills: 0    Associated Diagnoses: Type 2 diabetes mellitus without complication, without long-term current use of insulin ([facility])      OneTouch Delica Lancets 33G MISC May substitute brand based on insurance coverage. Check glucose BID.  Qty: 100 each, Refills: 0    Associated Diagnoses: Type 2 diabetes mellitus without complication, without long-term current use of insulin ([facility])      tobramycin (TOBREX) 0.3 % SOLN Administer 1 drop to the right eye 4 times a day           No discharge procedures on file.  ED SEPSIS DOCUMENTATION   Time reflects when diagnosis was documented in both MDM as applicable and the Disposition within this note       Time User Action Codes Description Comment    12/29/2024  2:23 PM Thiago Murdock Add [R53.1] Weakness     12/29/2024  3:05 PM Taqueria Ohara Add [R06.02] Shortness of breath     12/29/2024  3:05 PM Taqueria Ohara [R00.1] Bradycardia                  Taqueria Ohara Jr., DO  12/29/24 3181

## 2024-12-29 NOTE — ASSESSMENT & PLAN NOTE
Rate controlled not on AV kiko blockade  Continue home Coumadin 2 mg daily aside from 4 mg on Thursdays  Check PT/INR  Monitor heart rates

## 2024-12-30 PROBLEM — R60.0 BILATERAL LOWER EXTREMITY EDEMA: Status: ACTIVE | Noted: 2024-12-30

## 2024-12-30 PROBLEM — I95.9 HYPOTENSION: Status: ACTIVE | Noted: 2024-12-30

## 2024-12-30 LAB
ANION GAP SERPL CALCULATED.3IONS-SCNC: 9 MMOL/L (ref 4–13)
ATRIAL RATE: 234 BPM
BASOPHILS # BLD AUTO: 0.05 THOUSANDS/ΜL (ref 0–0.1)
BASOPHILS NFR BLD AUTO: 1 % (ref 0–1)
BUN SERPL-MCNC: 22 MG/DL (ref 5–25)
CALCIUM SERPL-MCNC: 8.1 MG/DL (ref 8.4–10.2)
CHLORIDE SERPL-SCNC: 99 MMOL/L (ref 96–108)
CO2 SERPL-SCNC: 30 MMOL/L (ref 21–32)
CREAT SERPL-MCNC: 1.06 MG/DL (ref 0.6–1.3)
EOSINOPHIL # BLD AUTO: 0.37 THOUSAND/ΜL (ref 0–0.61)
EOSINOPHIL NFR BLD AUTO: 4 % (ref 0–6)
ERYTHROCYTE [DISTWIDTH] IN BLOOD BY AUTOMATED COUNT: 17 % (ref 11.6–15.1)
GFR SERPL CREATININE-BSD FRML MDRD: 52 ML/MIN/1.73SQ M
GLUCOSE P FAST SERPL-MCNC: 94 MG/DL (ref 65–99)
GLUCOSE SERPL-MCNC: 101 MG/DL (ref 65–140)
GLUCOSE SERPL-MCNC: 94 MG/DL (ref 65–140)
HCT VFR BLD AUTO: 32.2 % (ref 34.8–46.1)
HGB BLD-MCNC: 8.8 G/DL (ref 11.5–15.4)
IMM GRANULOCYTES # BLD AUTO: 0.05 THOUSAND/UL (ref 0–0.2)
IMM GRANULOCYTES NFR BLD AUTO: 1 % (ref 0–2)
INR PPP: 2.05 (ref 0.85–1.19)
LYMPHOCYTES # BLD AUTO: 1.09 THOUSANDS/ΜL (ref 0.6–4.47)
LYMPHOCYTES NFR BLD AUTO: 12 % (ref 14–44)
MAGNESIUM SERPL-MCNC: 2 MG/DL (ref 1.9–2.7)
MCH RBC QN AUTO: 24.4 PG (ref 26.8–34.3)
MCHC RBC AUTO-ENTMCNC: 27.3 G/DL (ref 31.4–37.4)
MCV RBC AUTO: 89 FL (ref 82–98)
MONOCYTES # BLD AUTO: 0.46 THOUSAND/ΜL (ref 0.17–1.22)
MONOCYTES NFR BLD AUTO: 5 % (ref 4–12)
NEUTROPHILS # BLD AUTO: 6.85 THOUSANDS/ΜL (ref 1.85–7.62)
NEUTS SEG NFR BLD AUTO: 77 % (ref 43–75)
NRBC BLD AUTO-RTO: 0 /100 WBCS
PHOSPHATE SERPL-MCNC: 3.2 MG/DL (ref 2.3–4.1)
PLATELET # BLD AUTO: 421 THOUSANDS/UL (ref 149–390)
PMV BLD AUTO: 8.7 FL (ref 8.9–12.7)
POTASSIUM SERPL-SCNC: 3.7 MMOL/L (ref 3.5–5.3)
PROTHROMBIN TIME: 23.5 SECONDS (ref 12.3–15)
QRS AXIS: -27 DEGREES
QRSD INTERVAL: 16 MS
QT INTERVAL: 274 MS
QTC INTERVAL: 404 MS
RBC # BLD AUTO: 3.61 MILLION/UL (ref 3.81–5.12)
SODIUM SERPL-SCNC: 138 MMOL/L (ref 135–147)
T WAVE AXIS: 216 DEGREES
VENTRICULAR RATE: 131 BPM
WBC # BLD AUTO: 8.87 THOUSAND/UL (ref 4.31–10.16)

## 2024-12-30 PROCEDURE — 80048 BASIC METABOLIC PNL TOTAL CA: CPT | Performed by: INTERNAL MEDICINE

## 2024-12-30 PROCEDURE — 83735 ASSAY OF MAGNESIUM: CPT | Performed by: INTERNAL MEDICINE

## 2024-12-30 PROCEDURE — 93010 ELECTROCARDIOGRAM REPORT: CPT | Performed by: INTERNAL MEDICINE

## 2024-12-30 PROCEDURE — 82948 REAGENT STRIP/BLOOD GLUCOSE: CPT

## 2024-12-30 PROCEDURE — 85610 PROTHROMBIN TIME: CPT | Performed by: INTERNAL MEDICINE

## 2024-12-30 PROCEDURE — 99233 SBSQ HOSP IP/OBS HIGH 50: CPT

## 2024-12-30 PROCEDURE — 84100 ASSAY OF PHOSPHORUS: CPT | Performed by: INTERNAL MEDICINE

## 2024-12-30 PROCEDURE — 85025 COMPLETE CBC W/AUTO DIFF WBC: CPT | Performed by: INTERNAL MEDICINE

## 2024-12-30 RX ORDER — ALBUMIN HUMAN 50 G/1000ML
25 SOLUTION INTRAVENOUS ONCE
Status: COMPLETED | OUTPATIENT
Start: 2024-12-30 | End: 2024-12-30

## 2024-12-30 RX ORDER — POLYETHYLENE GLYCOL 3350 17 G/17G
17 POWDER, FOR SOLUTION ORAL DAILY
Status: DISCONTINUED | OUTPATIENT
Start: 2024-12-30 | End: 2025-01-01 | Stop reason: HOSPADM

## 2024-12-30 RX ORDER — FUROSEMIDE 10 MG/ML
80 INJECTION INTRAMUSCULAR; INTRAVENOUS ONCE
Status: COMPLETED | OUTPATIENT
Start: 2024-12-30 | End: 2024-12-30

## 2024-12-30 RX ORDER — MIDODRINE HYDROCHLORIDE 5 MG/1
5 TABLET ORAL
Status: DISCONTINUED | OUTPATIENT
Start: 2024-12-30 | End: 2025-01-01 | Stop reason: HOSPADM

## 2024-12-30 RX ORDER — ALBUMIN (HUMAN) 12.5 G/50ML
25 SOLUTION INTRAVENOUS ONCE
Status: COMPLETED | OUTPATIENT
Start: 2024-12-30 | End: 2024-12-30

## 2024-12-30 RX ORDER — MIDODRINE HYDROCHLORIDE 5 MG/1
5 TABLET ORAL ONCE
Status: COMPLETED | OUTPATIENT
Start: 2024-12-30 | End: 2024-12-30

## 2024-12-30 RX ADMIN — LEVOTHYROXINE SODIUM 137 MCG: 25 TABLET ORAL at 06:29

## 2024-12-30 RX ADMIN — MIDODRINE HYDROCHLORIDE 2.5 MG: 5 TABLET ORAL at 06:29

## 2024-12-30 RX ADMIN — MIDODRINE HYDROCHLORIDE 5 MG: 5 TABLET ORAL at 17:33

## 2024-12-30 RX ADMIN — FUROSEMIDE 60 MG: 20 TABLET ORAL at 17:33

## 2024-12-30 RX ADMIN — WARFARIN SODIUM 2 MG: 2 TABLET ORAL at 17:33

## 2024-12-30 RX ADMIN — GABAPENTIN 200 MG: 100 CAPSULE ORAL at 17:32

## 2024-12-30 RX ADMIN — PREDNISOLONE ACETATE 1 DROP: 10 SUSPENSION/ DROPS OPHTHALMIC at 09:22

## 2024-12-30 RX ADMIN — ALBUMIN (HUMAN) 25 G: 0.25 INJECTION, SOLUTION INTRAVENOUS at 09:21

## 2024-12-30 RX ADMIN — GABAPENTIN 200 MG: 100 CAPSULE ORAL at 21:53

## 2024-12-30 RX ADMIN — SODIUM CHLORIDE, SODIUM GLUCONATE, SODIUM ACETATE, POTASSIUM CHLORIDE, MAGNESIUM CHLORIDE, SODIUM PHOSPHATE, DIBASIC, AND POTASSIUM PHOSPHATE 500 ML: .53; .5; .37; .037; .03; .012; .00082 INJECTION, SOLUTION INTRAVENOUS at 00:03

## 2024-12-30 RX ADMIN — MIDODRINE HYDROCHLORIDE 5 MG: 5 TABLET ORAL at 11:11

## 2024-12-30 RX ADMIN — GABAPENTIN 200 MG: 100 CAPSULE ORAL at 09:22

## 2024-12-30 RX ADMIN — ALBUMIN (HUMAN) 25 G: 12.5 INJECTION, SOLUTION INTRAVENOUS at 01:26

## 2024-12-30 RX ADMIN — FUROSEMIDE 80 MG: 10 INJECTION, SOLUTION INTRAMUSCULAR; INTRAVENOUS at 09:22

## 2024-12-30 RX ADMIN — ALLOPURINOL 100 MG: 100 TABLET ORAL at 09:22

## 2024-12-30 RX ADMIN — PREDNISOLONE ACETATE 1 DROP: 10 SUSPENSION/ DROPS OPHTHALMIC at 17:37

## 2024-12-30 RX ADMIN — MIDODRINE HYDROCHLORIDE 5 MG: 5 TABLET ORAL at 01:26

## 2024-12-30 NOTE — ASSESSMENT & PLAN NOTE
Lab Results   Component Value Date    HGBA1C 7.2 (H) 11/06/2024       Recent Labs     12/29/24  1609 12/29/24 2017 12/30/24  0703   POCGLU 93 121 101       Blood Sugar Average: Last 72 hrs:  (P) 105  Well-controlled on outpatient oral regimen ; hold while inpatient  Blood sugars have been well-controlled  Patient refusing sliding scale or Accu-Cheks discontinued same  Diabetic diet 1800 mL fluid restriction  BMP a.m.

## 2024-12-30 NOTE — PLAN OF CARE
Problem: PAIN - ADULT  Goal: Verbalizes/displays adequate comfort level or baseline comfort level  Description: Interventions:  - Encourage patient to monitor pain and request assistance  - Assess pain using appropriate pain scale  - Administer analgesics based on type and severity of pain and evaluate response  - Implement non-pharmacological measures as appropriate and evaluate response  - Consider cultural and social influences on pain and pain management  - Notify physician/advanced practitioner if interventions unsuccessful or patient reports new pain  Outcome: Progressing     Problem: INFECTION - ADULT  Goal: Absence or prevention of progression during hospitalization  Description: INTERVENTIONS:  - Assess and monitor for signs and symptoms of infection  - Monitor lab/diagnostic results  - Monitor all insertion sites, i.e. indwelling lines, tubes, and drains  - Monitor endotracheal if appropriate and nasal secretions for changes in amount and color  - [place] appropriate cooling/warming therapies per order  - Administer medications as ordered  - Instruct and encourage patient and family to use good hand hygiene technique  - Identify and instruct in appropriate isolation precautions for identified infection/condition  Outcome: Progressing  Goal: Absence of fever/infection during neutropenic period  Description: INTERVENTIONS:  - Monitor WBC    Outcome: Progressing     Problem: SAFETY ADULT  Goal: Patient will remain free of falls  Description: INTERVENTIONS:  - Educate patient/family on patient safety including physical limitations  - Instruct patient to call for assistance with activity   - Consult OT/PT to assist with strengthening/mobility   - Keep Call bell within reach  - Keep bed low and locked with side rails adjusted as appropriate  - Keep care items and personal belongings within reach  - Initiate and maintain comfort rounds  - Make Fall Risk Sign visible to staff  - Offer Toileting every 2 Hours, in  advance of need  - Initiate/Maintain bed alarm  - Obtain necessary fall risk management equipment  - Apply yellow socks and bracelet for high fall risk patients  - Consider moving patient to room near nurses station  Outcome: Progressing  Goal: Maintain or return to baseline ADL function  Description: INTERVENTIONS:  -  Assess patient's ability to carry out ADLs; assess patient's baseline for ADL function and identify physical deficits which impact ability to perform ADLs (bathing, care of mouth/teeth, toileting, grooming, dressing, etc.)  - Assess/evaluate cause of self-care deficits   - Assess range of motion  - Assess patient's mobility; develop plan if impaired  - Assess patient's need for assistive devices and provide as appropriate  - Encourage maximum independence but intervene and supervise when necessary  - Involve family in performance of ADLs  - Assess for home care needs following discharge   - Consider OT consult to assist with ADL evaluation and planning for discharge  - Provide patient education as appropriate  Outcome: Progressing  Goal: Maintains/Returns to pre admission functional level  Description: INTERVENTIONS:  - Perform AM-PAC 6 Click Basic Mobility/ Daily Activity assessment daily.  - Set and communicate daily mobility goal to care team and patient/family/caregiver.   - Collaborate with rehabilitation services on mobility goals if consulted  - Perform Range of Motion 3 times a day.  - Reposition patient every 2 hours.  - Dangle patient 3 times a day  - Stand patient 3 times a day  - Ambulate patient 3 times a day  - Out of bed to chair 3 times a day   - Out of bed for meals 3 times a day  - Out of bed for toileting  - Record patient progress and toleration of activity level   Outcome: Progressing     Problem: DISCHARGE PLANNING  Goal: Discharge to home or other facility with appropriate resources  Description: INTERVENTIONS:  - Identify barriers to discharge w/patient and caregiver  -  Arrange for needed discharge resources and transportation as appropriate  - Identify discharge learning needs (meds, wound care, etc.)  - Arrange for interpretive services to assist at discharge as needed  - Refer to Case Management Department for coordinating discharge planning if the patient needs post-hospital services based on physician/advanced practitioner order or complex needs related to functional status, cognitive ability, or social support system  Outcome: Progressing     Problem: Knowledge Deficit  Goal: Patient/family/caregiver demonstrates understanding of disease process, treatment plan, medications, and discharge instructions  Description: Complete learning assessment and assess knowledge base.  Interventions:  - Provide teaching at level of understanding  - Provide teaching via preferred learning methods  Outcome: Progressing     Problem: Prexisting or High Potential for Compromised Skin Integrity  Goal: Skin integrity is maintained or improved  Description: INTERVENTIONS:  - Identify patients at risk for skin breakdown  - Assess and monitor skin integrity  - Assess and monitor nutrition and hydration status  - Monitor labs   - Assess for incontinence   - Turn and reposition patient  - Assist with mobility/ambulation  - Relieve pressure over bony prominences  - Avoid friction and shearing  - Provide appropriate hygiene as needed including keeping skin clean and dry  - Evaluate need for skin moisturizer/barrier cream  - Collaborate with interdisciplinary team   - Patient/family teaching  - Consider wound care consult   Outcome: Progressing

## 2024-12-30 NOTE — ASSESSMENT & PLAN NOTE
Patient presented with generalized weakness, noted to be hypotensive  Patient's blood pressures soft but improved  Increase midodrine to 5 mg 3 times daily  Continue to monitor BP per protocol

## 2024-12-30 NOTE — CASE MANAGEMENT
Case Management Assessment & Discharge Planning Note    Patient name Zee Kirk  Location /MS - MRN [id number]  : 1950 Date 2024       Current Admission Date: 2024  Current Admission Diagnosis:Weakness   Patient Active Problem List    Diagnosis Date Noted Date Diagnosed    Weakness 2024     Paroxysmal A-fib ([facility]) 2024     History of kidney stones 2024     Skin abnormalities 2024     Cataract, nuclear sclerotic senile, right 2024     Acute on chronic heart failure with preserved ejection fraction (HFpEF) ([facility]) 2024     Hypoxic episode 2024     Type 2 diabetes mellitus without complication, without long-term current use of insulin ([facility]) 2024     Long term (current) use of anticoagulants 2024     Subtherapeutic international normalized ratio (INR) 2024     Multiple open wounds 2024     Acute respiratory failure with hypoxia and hypercapnia ([facility]) 2024     Chest discomfort 2024     Left leg pain 2023     Shingles 2023     Cellulitis- Ruled Out 2023     Longstanding persistent atrial fibrillation ([facility]) 2022     History of Clostridioides difficile infection 2022     Hypothyroid 10/03/2020     Mild episode of recurrent major depressive disorder ([facility]) 10/03/2020     Idiopathic chronic 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 10/03/2020     Class 3 severe obesity due to excess calories with serious comorbidity and body mass index (BMI) greater than or equal to 70 in adult ([facility]) 10/03/2020     Acute on chronic diastolic congestive heart failure ([facility]) 10/03/2020     Panniculitis 10/03/2020     Gastroesophageal reflux disease without esophagitis 10/03/2020     Hx MRSA infection 2020     Impaired mobility 2019     Osteoarthritis of glenohumeral joint 2019     Left ovarian cyst 2017     Depression with anxiety 07/15/2017     Anemia 2016     Adjustment  disorder 09/05/2013     Irritable bowel syndrome 12/04/2012     Left atrial enlargement 12/04/2012     Left ventricular hypertrophy 12/04/2012     Carpal tunnel syndrome 05/30/2012     Gout 05/30/2012     Hyperlipidemia 05/30/2012     Symptomatic menopausal or female climacteric states 05/30/2012       LOS (days): 0  Geometric Mean LOS (GMLOS) (days):   Days to GMLOS:     OBJECTIVE:              Current admission status: Observation       Preferred Pharmacy:   [facility]  [place] [address]  Phone: [phone] Fax: [phone]    Primary Care Provider: Franklyn Caruso MD    Primary Insurance: MEDICARE  Secondary Insurance: AARP    ASSESSMENT:  Active Health Care Proxies       Robbie Richmond [facility] Health Care Representative - Nephew   Primary Phone: [phone] (Mobile)                           Readmission Root Cause  30 Day Readmission: Yes  During your hospital stay, did someone (provider, nurse, ) explain your care to you in a way you could understand?: Yes  Did you feel medically stable to leave the hospital?: Yes  Were you able to pay for your medication at the pharmacy?: Yes  Did you have reliable transportation to take you to your appointments?: Yes  During previous admission, was a post-acute recommendation made?: Yes  What post-acute resources were offered?: [facility]  Patient was readmitted due to: weaki  Action Plan: TBD    Patient Information  Admitted from:: Home  Mental Status: Alert                                   DISCHARGE DETAILS:    Discharge planning discussed with:: see assessment completed 12/13/24  Freedom of Choice: Yes                                                                                         Additional Comments: has caregivers thru [facility]  [phone]

## 2024-12-30 NOTE — ASSESSMENT & PLAN NOTE
Presents with generalized weakness of unclear etiology  Possibly due to hypotension  Bedbound at baseline  PT/OT evaluation and treatment  Case management consultation for safe disposition planning  Increased midodrine dosing today

## 2024-12-30 NOTE — PROGRESS NOTES
Progress Note - Hospitalist   Name: Zee Kirk 74 y.o. female I MRN: [id number]  Unit/Bed#: -01 I Date of Admission: 12/29/2024   Date of Service: 12/30/2024 I Hospital Day: 0    Assessment & Plan  Weakness  Presents with generalized weakness of unclear etiology  Possibly due to hypotension  Bedbound at baseline  PT/OT evaluation and treatment  Case management consultation for safe disposition planning  Increased midodrine dosing today  Paroxysmal A-fib (HCC)  Rate controlled not on AV kiko blockade  INR therapeutic  Continue home Coumadin 2 mg daily aside from 4 mg on Thursdays  Monitor heart rates  Daily PT/INR  Type 2 diabetes mellitus without complication, without long-term current use of insulin (HCC)  Lab Results   Component Value Date    HGBA1C 7.2 (H) 11/06/2024       Recent Labs     12/29/24  1609 12/29/24 2017 12/30/24  0703   POCGLU 93 121 101       Blood Sugar Average: Last 72 hrs:  (P) 105  Well-controlled on outpatient oral regimen ; hold while inpatient  Blood sugars have been well-controlled  Patient refusing sliding scale or Accu-Cheks discontinued same  Diabetic diet 1800 mL fluid restriction  BMP a.m.  Hypothyroid  Continue home levothyroxine  Class 3 severe obesity due to excess calories with serious comorbidity and body mass index (BMI) greater than or equal to 70 in adult (HCC)  Present on admission as evidenced by BMI of 76  Encouraged lifestyle modifications and weight loss  Bilateral lower extremity edema  Patient noted to have bilateral lower extremity edema  Weight is up 15 pounds from baseline per patient  Patient reports was holding her Lasix at home due to hypotension-states was not discharged on midodrine last admission  Increase midodrine to 5 mg 3 times a day  Ordered albumin x 1 to help support blood pressure for Lasix  Will give 80 mg IV Lasix x 1, then resume prehospital Lasix regimen  Monitor intake and output  Daily weights  Elevate legs in  bed  Hypotension  Patient presented with generalized weakness, noted to be hypotensive  Patient's blood pressures soft but improved  Increase midodrine to 5 mg 3 times daily  Continue to monitor BP per protocol    VTE Pharmacologic Prophylaxis: VTE Score: 5 High Risk (Score >/= 5) - Pharmacological DVT Prophylaxis Ordered: warfarin (Coumadin). Sequential Compression Devices Ordered.    Mobility:   Basic Mobility Inpatient Raw Score: 6  JH-HLM Goal: 2: Bed activities/Dependent transfer  JH-HLM Achieved: 2: Bed activities/Dependent transfer  JH-HLM Goal achieved. Continue to encourage appropriate mobility.    Patient Centered Rounds: I performed bedside rounds with nursing staff today.   Discussions with Specialists or Other Care Team Provider: Nursing, PT/OT, case management    Education and Discussions with Family / Patient: Patient declined call to .     Current Length of Stay: 0 day(s)  Current Patient Status: Inpatient   Certification Statement: The patient will continue to require additional inpatient hospital stay due to bilateral lower extremity edema requiring IV diuretics, close monitoring of fluid status  Discharge Plan: Anticipate discharge in 24-48 hrs to with caretakers    Code Status: Level 1 - Full Code    Subjective   Patient reports that she is 15 pounds above her baseline weight, with worsening lower extremity edema.  Denies any dizziness, lightheadedness, chest pain or palpitations.  Readily verbalize needs.    Objective :  Temp:  [98.2 °F (36.8 °C)-98.3 °F (36.8 °C)] 98.2 °F (36.8 °C)  HR:  [47-69] 59  BP: ()/(39-58) 99/49  Resp:  [17-18] 18  SpO2:  [92 %-99 %] 96 %  O2 Device: Nasal cannula    Body mass index is 77.76 kg/m².     Input and Output Summary (last 24 hours):     Intake/Output Summary (Last 24 hours) at 12/30/2024 1253  Last data filed at 12/30/2024 1222  Gross per 24 hour   Intake 1162 ml   Output 1450 ml   Net -288 ml       Physical Exam  Vitals and nursing note  reviewed.   Constitutional:       General: She is not in acute distress.     Appearance: She is well-developed. She is obese. She is not ill-appearing.   HENT:      Head: Normocephalic and atraumatic.   Eyes:      Conjunctiva/sclera: Conjunctivae normal.   Cardiovascular:      Rate and Rhythm: Normal rate. Rhythm irregular.      Pulses: Normal pulses.      Heart sounds: Normal heart sounds. No murmur heard.  Pulmonary:      Effort: Pulmonary effort is normal. No respiratory distress.      Breath sounds: No wheezing.      Comments: Nonlabored respirations at rest on O2 2 L nasal cannula, decreased breath sounds bilateral bases, no cough or shortness of breath  Abdominal:      General: Bowel sounds are normal. There is no distension.      Palpations: Abdomen is soft.      Tenderness: There is no abdominal tenderness. There is no guarding.   Musculoskeletal:         General: Swelling present.      Right lower leg: Edema present.      Left lower leg: Edema present.   Skin:     General: Skin is warm and dry.      Capillary Refill: Capillary refill takes less than 2 seconds.   Neurological:      General: No focal deficit present.      Mental Status: She is alert and oriented to person, place, and time. Mental status is at baseline.   Psychiatric:         Mood and Affect: Mood normal.         Behavior: Behavior normal.           Lines/Drains:  Lines/Drains/Airways       Active Status       Name Placement date Placement time Site Days    External Urinary Catheter 12/29/24  --  -- 1                            Lab Results: I have reviewed the following results:   Results from last 7 days   Lab Units 12/30/24  0524   WBC Thousand/uL 8.87   HEMOGLOBIN g/dL 8.8*   HEMATOCRIT % 32.2*   PLATELETS Thousands/uL 421*   SEGS PCT % 77*   LYMPHO PCT % 12*   MONO PCT % 5   EOS PCT % 4     Results from last 7 days   Lab Units 12/30/24  0524 12/29/24  1211   SODIUM mmol/L 138 138   POTASSIUM mmol/L 3.7 3.6   CHLORIDE mmol/L 99 98   CO2  mmol/L 30 34*   BUN mg/dL 22 23   CREATININE mg/dL 1.06 1.08   ANION GAP mmol/L 9 6   CALCIUM mg/dL 8.1* 8.5   ALBUMIN g/dL  --  2.7*   TOTAL BILIRUBIN mg/dL  --  0.65   ALK PHOS U/L  --  73   ALT U/L  --  5*   AST U/L  --  11*   GLUCOSE RANDOM mg/dL 94 113     Results from last 7 days   Lab Units 12/30/24  0524   INR  2.05*     Results from last 7 days   Lab Units 12/30/24  0703 12/29/24 2017 12/29/24  1609   POC GLUCOSE mg/dl 101 121 93               Recent Cultures (last 7 days):         Imaging Results Review: I reviewed radiology reports from this admission including: chest xray.  Other Study Results Review: EKG was reviewed.     Last 24 Hours Medication List:     Current Facility-Administered Medications:     acetaminophen (TYLENOL) tablet 650 mg, Q4H PRN    allopurinol (ZYLOPRIM) tablet 100 mg, Daily    furosemide (LASIX) tablet 60 mg, BID (diuretic)    gabapentin (NEURONTIN) capsule 200 mg, TID    levothyroxine tablet 137 mcg, Daily    midodrine (PROAMATINE) tablet 5 mg, TID AC    ondansetron (ZOFRAN) injection 4 mg, Q6H PRN    prednisoLONE acetate (PRED FORTE) 1 % ophthalmic suspension 1 drop, BID    warfarin (COUMADIN) tablet 2 mg, Once per day on Monday Tuesday Wednesday Friday Saturday    warfarin (COUMADIN) tablet 4 mg, Once per day on Sunday Thursday    Administrative Statements   Today, Patient Was Seen By: ORACIO Last  I have spent a total time of 37 minutes in caring for this patient on the day of the visit/encounter including Patient and family education, Importance of tx compliance, Documenting in the medical record, Reviewing / ordering tests, medicine, procedures  , Obtaining or reviewing history  , and Communicating with other healthcare professionals .    **Please Note: This note may have been constructed using a voice recognition system.**

## 2024-12-30 NOTE — ASSESSMENT & PLAN NOTE
Patient noted to have bilateral lower extremity edema  Weight is up 15 pounds from baseline per patient  Patient reports was holding her Lasix at home due to hypotension-states was not discharged on midodrine last admission  Increase midodrine to 5 mg 3 times a day  Ordered albumin x 1 to help support blood pressure for Lasix  Will give 80 mg IV Lasix x 1, then resume prehospital Lasix regimen  Monitor intake and output  Daily weights  Elevate legs in bed

## 2024-12-30 NOTE — ASSESSMENT & PLAN NOTE
Rate controlled not on AV kiko blockade  INR therapeutic  Continue home Coumadin 2 mg daily aside from 4 mg on Thursdays  Monitor heart rates  Daily PT/INR

## 2024-12-31 LAB
ANION GAP SERPL CALCULATED.3IONS-SCNC: 5 MMOL/L (ref 4–13)
BASOPHILS # BLD AUTO: 0.04 THOUSANDS/ΜL (ref 0–0.1)
BASOPHILS NFR BLD AUTO: 0 % (ref 0–1)
BUN SERPL-MCNC: 23 MG/DL (ref 5–25)
CALCIUM SERPL-MCNC: 8.2 MG/DL (ref 8.4–10.2)
CHLORIDE SERPL-SCNC: 99 MMOL/L (ref 96–108)
CO2 SERPL-SCNC: 36 MMOL/L (ref 21–32)
CREAT SERPL-MCNC: 1.16 MG/DL (ref 0.6–1.3)
EOSINOPHIL # BLD AUTO: 0.42 THOUSAND/ΜL (ref 0–0.61)
EOSINOPHIL NFR BLD AUTO: 4 % (ref 0–6)
ERYTHROCYTE [DISTWIDTH] IN BLOOD BY AUTOMATED COUNT: 17.1 % (ref 11.6–15.1)
GFR SERPL CREATININE-BSD FRML MDRD: 46 ML/MIN/1.73SQ M
GLUCOSE SERPL-MCNC: 103 MG/DL (ref 65–140)
HCT VFR BLD AUTO: 32.4 % (ref 34.8–46.1)
HGB BLD-MCNC: 8.8 G/DL (ref 11.5–15.4)
IMM GRANULOCYTES # BLD AUTO: 0.05 THOUSAND/UL (ref 0–0.2)
IMM GRANULOCYTES NFR BLD AUTO: 1 % (ref 0–2)
INR PPP: 2.04 (ref 0.85–1.19)
LYMPHOCYTES # BLD AUTO: 1.16 THOUSANDS/ΜL (ref 0.6–4.47)
LYMPHOCYTES NFR BLD AUTO: 11 % (ref 14–44)
MAGNESIUM SERPL-MCNC: 2.1 MG/DL (ref 1.9–2.7)
MCH RBC QN AUTO: 24.5 PG (ref 26.8–34.3)
MCHC RBC AUTO-ENTMCNC: 27.2 G/DL (ref 31.4–37.4)
MCV RBC AUTO: 90 FL (ref 82–98)
MONOCYTES # BLD AUTO: 0.56 THOUSAND/ΜL (ref 0.17–1.22)
MONOCYTES NFR BLD AUTO: 6 % (ref 4–12)
NEUTROPHILS # BLD AUTO: 7.97 THOUSANDS/ΜL (ref 1.85–7.62)
NEUTS SEG NFR BLD AUTO: 78 % (ref 43–75)
NRBC BLD AUTO-RTO: 0 /100 WBCS
PHOSPHATE SERPL-MCNC: 3.4 MG/DL (ref 2.3–4.1)
PLATELET # BLD AUTO: 410 THOUSANDS/UL (ref 149–390)
PMV BLD AUTO: 8.5 FL (ref 8.9–12.7)
POTASSIUM SERPL-SCNC: 4 MMOL/L (ref 3.5–5.3)
PROTHROMBIN TIME: 23.4 SECONDS (ref 12.3–15)
RBC # BLD AUTO: 3.59 MILLION/UL (ref 3.81–5.12)
SODIUM SERPL-SCNC: 140 MMOL/L (ref 135–147)
WBC # BLD AUTO: 10.2 THOUSAND/UL (ref 4.31–10.16)

## 2024-12-31 PROCEDURE — 99233 SBSQ HOSP IP/OBS HIGH 50: CPT

## 2024-12-31 PROCEDURE — 85025 COMPLETE CBC W/AUTO DIFF WBC: CPT | Performed by: INTERNAL MEDICINE

## 2024-12-31 PROCEDURE — 85610 PROTHROMBIN TIME: CPT

## 2024-12-31 PROCEDURE — 80048 BASIC METABOLIC PNL TOTAL CA: CPT | Performed by: INTERNAL MEDICINE

## 2024-12-31 PROCEDURE — 84100 ASSAY OF PHOSPHORUS: CPT | Performed by: INTERNAL MEDICINE

## 2024-12-31 PROCEDURE — 83735 ASSAY OF MAGNESIUM: CPT | Performed by: INTERNAL MEDICINE

## 2024-12-31 RX ORDER — BISACODYL 10 MG
10 SUPPOSITORY, RECTAL RECTAL ONCE
Status: COMPLETED | OUTPATIENT
Start: 2024-12-31 | End: 2024-12-31

## 2024-12-31 RX ORDER — DOCUSATE SODIUM 100 MG/1
100 CAPSULE, LIQUID FILLED ORAL 2 TIMES DAILY
Status: DISCONTINUED | OUTPATIENT
Start: 2024-12-31 | End: 2025-01-01 | Stop reason: HOSPADM

## 2024-12-31 RX ADMIN — GABAPENTIN 200 MG: 100 CAPSULE ORAL at 15:37

## 2024-12-31 RX ADMIN — FUROSEMIDE 60 MG: 20 TABLET ORAL at 15:38

## 2024-12-31 RX ADMIN — MIDODRINE HYDROCHLORIDE 5 MG: 5 TABLET ORAL at 11:47

## 2024-12-31 RX ADMIN — ACETAMINOPHEN 650 MG: 325 TABLET ORAL at 21:42

## 2024-12-31 RX ADMIN — MIDODRINE HYDROCHLORIDE 5 MG: 5 TABLET ORAL at 06:18

## 2024-12-31 RX ADMIN — POLYETHYLENE GLYCOL 3350 17 G: 17 POWDER, FOR SOLUTION ORAL at 08:46

## 2024-12-31 RX ADMIN — LEVOTHYROXINE SODIUM 137 MCG: 25 TABLET ORAL at 06:18

## 2024-12-31 RX ADMIN — BISACODYL 10 MG: 10 SUPPOSITORY RECTAL at 23:40

## 2024-12-31 RX ADMIN — PREDNISOLONE ACETATE 1 DROP: 10 SUSPENSION/ DROPS OPHTHALMIC at 18:01

## 2024-12-31 RX ADMIN — DOCUSATE SODIUM 100 MG: 100 CAPSULE, LIQUID FILLED ORAL at 23:40

## 2024-12-31 RX ADMIN — FUROSEMIDE 60 MG: 20 TABLET ORAL at 08:46

## 2024-12-31 RX ADMIN — ACETAMINOPHEN 650 MG: 325 TABLET ORAL at 15:38

## 2024-12-31 RX ADMIN — POLYETHYLENE GLYCOL 3350 17 G: 17 POWDER, FOR SOLUTION ORAL at 00:49

## 2024-12-31 RX ADMIN — WARFARIN SODIUM 2 MG: 2 TABLET ORAL at 18:00

## 2024-12-31 RX ADMIN — MIDODRINE HYDROCHLORIDE 5 MG: 5 TABLET ORAL at 15:38

## 2024-12-31 RX ADMIN — GABAPENTIN 200 MG: 100 CAPSULE ORAL at 21:39

## 2024-12-31 RX ADMIN — PREDNISOLONE ACETATE 1 DROP: 10 SUSPENSION/ DROPS OPHTHALMIC at 08:47

## 2024-12-31 RX ADMIN — GABAPENTIN 200 MG: 100 CAPSULE ORAL at 08:46

## 2024-12-31 RX ADMIN — ALLOPURINOL 100 MG: 100 TABLET ORAL at 08:46

## 2024-12-31 NOTE — PROGRESS NOTES
Progress Note - Hospitalist   Name: Zee Kirk 74 y.o. female I MRN: [id number]  Unit/Bed#: -01 I Date of Admission: 12/29/2024   Date of Service: 12/31/2024 I Hospital Day: 1    Assessment & Plan  Weakness  Presents with generalized weakness of unclear etiology  Possibly due to hypotension  Blood pressure improved with midodrine increased yesterday  Bedbound at baseline  Case management following-patient has caretakers that need 24-hour notice of her being discharged.  They are aware her discharge plan is for tomorrow morning.  Paroxysmal A-fib (HCC)  Rate controlled not on AV kiko blockade  INR therapeutic  Continue home Coumadin 2 mg daily aside from 4 mg on Thursdays  Monitor heart rates  Daily PT/INR  Type 2 diabetes mellitus without complication, without long-term current use of insulin (HCC)  Lab Results   Component Value Date    HGBA1C 7.2 (H) 11/06/2024       Recent Labs     12/29/24  1609 12/29/24 2017 12/30/24  0703   POCGLU 93 121 101       Blood Sugar Average: Last 72 hrs:  (P) 105  Well-controlled on outpatient oral regimen ; hold while inpatient  Blood sugars have been well-controlled  Patient refusing sliding scale or Accu-Cheks discontinued same  Diabetic diet 1800 mL fluid restriction  BMP a.m.  Hypothyroid  Continue home levothyroxine  Class 3 severe obesity due to excess calories with serious comorbidity and body mass index (BMI) greater than or equal to 70 in adult (HCC)  Present on admission as evidenced by BMI of 76  Encouraged lifestyle modifications and weight loss  Bilateral lower extremity edema  Patient noted to have bilateral lower extremity edema, improved from yesterday  Patient reports was holding her Lasix at home due to hypotension-states was not discharged on midodrine last admission  12/30 gave 80 mg IV Lasix x 1 then resumed prehospital Lasix regimen  12/30 increased midodrine from 2.5 mg 3 times a day to 5 mg 3 times a day-blood pressure improved today  Edema  improved today  Question reliability of weights  Monitor intake and output  Daily weights  Elevate legs in bed  Hypotension  Patient presented with generalized weakness, noted to be hypotensive  Blood pressure improved today with increase in midodrine dosing yesterday  Continue to monitor BP per protocol    VTE Pharmacologic Prophylaxis: VTE Score: 5 High Risk (Score >/= 5) - Pharmacological DVT Prophylaxis Ordered: warfarin (Coumadin). Sequential Compression Devices Ordered.    Mobility:   Basic Mobility Inpatient Raw Score: 6  JH-HLM Goal: 2: Bed activities/Dependent transfer  JH-HLM Achieved: 2: Bed activities/Dependent transfer  JH-HLM Goal achieved. Continue to encourage appropriate mobility.    Patient Centered Rounds: I performed bedside rounds with nursing staff today.   Discussions with Specialists or Other Care Team Provider: Nursing, PT/OT, case management    Education and Discussions with Family / Patient: Patient declined call to .     Current Length of Stay: 1 day(s)  Current Patient Status: Inpatient   Certification Statement: The patient will continue to require additional inpatient hospital stay due to coordination of care  Discharge Plan: Anticipate discharge tomorrow to home with caretakers    Code Status: Level 1 - Full Code    Subjective   Denies any dizziness, lightheadedness, chest pain or palpitations.  Verbalizing needs.    Objective :  Temp:  [98.2 °F (36.8 °C)-98.7 °F (37.1 °C)] 98.2 °F (36.8 °C)  HR:  [53-60] 59  BP: ()/(41-54) 105/54  Resp:  [18-20] 20  SpO2:  [91 %-99 %] 99 %  O2 Device: Nasal cannula  Nasal Cannula O2 Flow Rate (L/min):  [2 L/min] 2 L/min    Body mass index is 77.79 kg/m².     Input and Output Summary (last 24 hours):     Intake/Output Summary (Last 24 hours) at 12/31/2024 1240  Last data filed at 12/31/2024 1145  Gross per 24 hour   Intake 1080 ml   Output 1200 ml   Net -120 ml       Physical Exam  Vitals and nursing note reviewed.    Constitutional:       General: She is not in acute distress.     Appearance: She is well-developed. She is obese. She is not ill-appearing.   HENT:      Head: Normocephalic and atraumatic.   Cardiovascular:      Rate and Rhythm: Normal rate. Rhythm irregular.      Pulses: Normal pulses.      Heart sounds: Normal heart sounds. No murmur heard.  Pulmonary:      Effort: Pulmonary effort is normal. No respiratory distress.      Breath sounds: No wheezing.   Abdominal:      General: Bowel sounds are normal. There is no distension.      Palpations: Abdomen is soft.      Tenderness: There is no abdominal tenderness. There is no guarding.   Musculoskeletal:         General: Swelling present.      Right lower leg: Edema present.      Left lower leg: Edema present.   Skin:     General: Skin is warm and dry.      Capillary Refill: Capillary refill takes less than 2 seconds.   Neurological:      General: No focal deficit present.      Mental Status: She is alert and oriented to person, place, and time. Mental status is at baseline.   Psychiatric:         Mood and Affect: Mood normal.         Behavior: Behavior normal.           Lines/Drains:  Lines/Drains/Airways       Active Status       Name Placement date Placement time Site Days    External Urinary Catheter 12/29/24  --  -- 2                            Lab Results: I have reviewed the following results:   Results from last 7 days   Lab Units 12/31/24  0619   WBC Thousand/uL 10.20*   HEMOGLOBIN g/dL 8.8*   HEMATOCRIT % 32.4*   PLATELETS Thousands/uL 410*   SEGS PCT % 78*   LYMPHO PCT % 11*   MONO PCT % 6   EOS PCT % 4     Results from last 7 days   Lab Units 12/31/24  0619 12/30/24  0524 12/29/24  1211   SODIUM mmol/L 140   < > 138   POTASSIUM mmol/L 4.0   < > 3.6   CHLORIDE mmol/L 99   < > 98   CO2 mmol/L 36*   < > 34*   BUN mg/dL 23   < > 23   CREATININE mg/dL 1.16   < > 1.08   ANION GAP mmol/L 5   < > 6   CALCIUM mg/dL 8.2*   < > 8.5   ALBUMIN g/dL  --   --  2.7*    TOTAL BILIRUBIN mg/dL  --   --  0.65   ALK PHOS U/L  --   --  73   ALT U/L  --   --  5*   AST U/L  --   --  11*   GLUCOSE RANDOM mg/dL 103   < > 113    < > = values in this interval not displayed.     Results from last 7 days   Lab Units 12/31/24  0619   INR  2.04*     Results from last 7 days   Lab Units 12/30/24  0703 12/29/24 2017 12/29/24  1609   POC GLUCOSE mg/dl 101 121 93               Recent Cultures (last 7 days):         Imaging Results Review: I reviewed radiology reports from this admission including: chest xray.  Other Study Results Review: EKG was reviewed.     Last 24 Hours Medication List:     Current Facility-Administered Medications:     acetaminophen (TYLENOL) tablet 650 mg, Q4H PRN    allopurinol (ZYLOPRIM) tablet 100 mg, Daily    furosemide (LASIX) tablet 60 mg, BID (diuretic)    gabapentin (NEURONTIN) capsule 200 mg, TID    levothyroxine tablet 137 mcg, Daily    midodrine (PROAMATINE) tablet 5 mg, TID AC    ondansetron (ZOFRAN) injection 4 mg, Q6H PRN    polyethylene glycol (MIRALAX) packet 17 g, Daily    prednisoLONE acetate (PRED FORTE) 1 % ophthalmic suspension 1 drop, BID    warfarin (COUMADIN) tablet 2 mg, Once per day on Monday Tuesday Wednesday Friday Saturday    warfarin (COUMADIN) tablet 4 mg, Once per day on Sunday Thursday    Administrative Statements   Today, Patient Was Seen By: ORACIO Last  I have spent a total time of 39 minutes in caring for this patient on the day of the visit/encounter including Patient and family education, Importance of tx compliance, Documenting in the medical record, Reviewing / ordering tests, medicine, procedures  , Obtaining or reviewing history  , and Communicating with other healthcare professionals .    **Please Note: This note may have been constructed using a voice recognition system.**

## 2024-12-31 NOTE — ASSESSMENT & PLAN NOTE
Patient noted to have bilateral lower extremity edema, improved from yesterday  Patient reports was holding her Lasix at home due to hypotension-states was not discharged on midodrine last admission  12/30 gave 80 mg IV Lasix x 1 then resumed prehospital Lasix regimen  12/30 increased midodrine from 2.5 mg 3 times a day to 5 mg 3 times a day-blood pressure improved today  Edema improved today  Question reliability of weights  Monitor intake and output  Daily weights  Elevate legs in bed

## 2024-12-31 NOTE — PHYSICAL THERAPY NOTE
PT Screen    PT referral received. Completed PT screen. Pt is currently functioning at baseline level with no acute PT needs at this time. Spoke with pt, pt bed bound since 2020 and has caregiver at home 12 hrs per day. Will d/c pt at this time and reassess if medically needed.

## 2024-12-31 NOTE — ASSESSMENT & PLAN NOTE
Patient presented with generalized weakness, noted to be hypotensive  Blood pressure improved today with increase in midodrine dosing yesterday  Continue to monitor BP per protocol

## 2024-12-31 NOTE — ASSESSMENT & PLAN NOTE
Presents with generalized weakness of unclear etiology  Possibly due to hypotension  Blood pressure improved with midodrine increased yesterday  Bedbound at baseline  Case management following-patient has caretakers that need 24-hour notice of her being discharged.  They are aware her discharge plan is for tomorrow morning.

## 2024-12-31 NOTE — WOUND OSTOMY CARE
Consult Note - Wound   Zee Kirk 74 y.o. female MRN: [id number]  Unit/Bed#: -01 Encounter: [id number]      History and Present Illness:  Patient is 74 year old [place] [facility] [place]. Patient history significant for morbid obesity with a BMI of 77.76, chronic lymphedema. recurrent cellulitis, C-diff colitis, AFIB, CHF and hypothyroidism, she is bed bound.   Wound care consulted for multiple wounds. Patient seen on prior admissions by wound care team for recurrent wounds to the body folds. She refuses Maxorb/ Ag, Maxorb rope, Interdry. Patient has her preference for skin fold breakdown. She has Nystatin powder ordered and uses Kenalog cream at home for the wounds to the abdominal/pannus folds. She will allow ABD pads to be placed in the folds.     Assessment Findings:   Patient on bariatric bed, she is awake, alert and oriented. She is a max assist to turn in the bed. Patient has multiple layers beneath her, she prefers her home quilted pads beneath her rather than the incontinence pads used at the hospital. She has a large amount of skin breakdown/rash to her body folds and back. Patient seen with primary RN, skin care and hygiene care done at time of assessment. She has a purewick in place for urinary management, and is not incontinent of stool. She is a moderate assist with hygiene care and is able to feed herself. She has an extensive rash to the back and in the folds of the legs, axilla, abdomen, breasts, and on the buttocks. Unknown etiology of the rash, suspect rash from heat/moisture/fungal as it is in folds and on the back, abdomen, breast folds, thigh and knee folds. Outer edges of the rash with scattered dry pink lesions. Scattered dried scabs on the upper chest from patient scratching, no open areas or erythema.     1- Bilateral elbows, sacrum, buttock, heels, and right hip- skin is dry, intact, blanchable.   2- Bilateral upper chest noted with excoriations and dry scabs,  patient states she has been scratching   3- Bilateral thigh and knee skin folds- small linear open wound bed in the fold of the right upper thigh fold, no drainage. Skin on assessment is dry and intact,  Skin noted with scattered red irregular rash.   4- MASD/ITD abdominal pannus- pink-red in folds, open area noted measuring 0.5 X 0.5 X 0.1, no drainage, located within old scarring, this area has reopened frequently  5- Left breast fold - mid breast fold MASD/ITD with open area, cleansed and applied Nystatin powder. Lateral left breast skin rash, scattered beefy red, Interdry placed.   6- Right breast, left and right axilla dry red rash. No open areas at time of assessment.   7- Scattered scarring on the anterior abdomen from prior skin erosions.   8- Beefy red intact rash to the back. Unknown etiology    Skin and Wound Care Plan:   1- Turn and reposition Q2 hours as patient tolerates  2- Wash with soap and water daily, pat dry. Per patient request - apply thin layer of Kenalog cream to the affected areas of the body, place ABD pads in folds where cream has been applied and on patient's back. Limit layers beneath patient to decrease moisture to the skin from perspiration.  3- Elevate heels when in bed on pillows if patient tolerates    Wound:  Wound 11/07/24 Breast Lateral;Left (Active)   Wound Image   12/30/24 1524       Wound 12/11/24 Axilla Left (Active)   Wound Image   12/30/24 1524       Wound 12/11/24 Axilla Right (Active)   Wound Image   12/30/24 1523       Wound 12/30/24 MASD Pelvis Anterior;Right (Active)       Wound 12/30/24 MASD Pelvis Anterior;Right (Active)   Wound Image   12/30/24 1527   Wound Length (cm) 0.5 cm 12/30/24 1527   Wound Width (cm) 0.5 cm 12/30/24 1527   Wound Depth (cm) 0.1 cm 12/30/24 1527   Wound Surface Area (cm^2) 0.25 cm^2 12/30/24 1527   Wound Volume (cm^3) 0.025 cm^3 12/30/24 1527   Calculated Wound Volume (cm^3) 0.03 cm^3 12/30/24 1527       Wound 12/30/24 MASD Thigh Anterior;Left  (Active)   Wound Image   12/30/24 1530       Wound 12/30/24 MASD Pretibial Left;Proximal (Active)   Wound Image   12/30/24 1531       Wound 12/30/24 MASD Thigh Anterior;Right (Active)   Wound Image   12/30/24 1532       Wound 12/30/24 MASD Pretibial Proximal;Right (Active)   Wound Image   12/30/24 1533     Reviewed plan of care with primary RN Michelle  Recommendations written as orders  Wound care team to follow weekly while admitted  Questions or concerns Secure Chat Wound Care Nurse    Liv Fam BSN, RN, CWON

## 2024-12-31 NOTE — PLAN OF CARE
Problem: PAIN - ADULT  Goal: Verbalizes/displays adequate comfort level or baseline comfort level  Description: Interventions:  - Encourage patient to monitor pain and request assistance  - Assess pain using appropriate pain scale  - Administer analgesics based on type and severity of pain and evaluate response  - Implement non-pharmacological measures as appropriate and evaluate response  - Consider cultural and social influences on pain and pain management  - Notify physician/advanced practitioner if interventions unsuccessful or patient reports new pain  Outcome: Progressing     Problem: INFECTION - ADULT  Goal: Absence or prevention of progression during hospitalization  Description: INTERVENTIONS:  - Assess and monitor for signs and symptoms of infection  - Monitor lab/diagnostic results  - Monitor all insertion sites, i.e. indwelling lines, tubes, and drains  - Monitor endotracheal if appropriate and nasal secretions for changes in amount and color  - [place] appropriate cooling/warming therapies per order  - Administer medications as ordered  - Instruct and encourage patient and family to use good hand hygiene technique  - Identify and instruct in appropriate isolation precautions for identified infection/condition  Outcome: Progressing  Goal: Absence of fever/infection during neutropenic period  Description: INTERVENTIONS:  - Monitor WBC    Outcome: Progressing     Problem: SAFETY ADULT  Goal: Patient will remain free of falls  Description: INTERVENTIONS:  - Educate patient/family on patient safety including physical limitations  - Instruct patient to call for assistance with activity   - Consult OT/PT to assist with strengthening/mobility   - Keep Call bell within reach  - Keep bed low and locked with side rails adjusted as appropriate  - Keep care items and personal belongings within reach  - Initiate and maintain comfort rounds  - Make Fall Risk Sign visible to staff  - Offer Toileting every 2 Hours,  in advance of need  - Initiate/Maintain bedalarm  - Obtain necessary fall risk management equipment: nonskid socks  - Apply yellow socks and bracelet for high fall risk patients  - Consider moving patient to room near nurses station  Outcome: Progressing  Goal: Maintain or return to baseline ADL function  Description: INTERVENTIONS:  -  Assess patient's ability to carry out ADLs; assess patient's baseline for ADL function and identify physical deficits which impact ability to perform ADLs (bathing, care of mouth/teeth, toileting, grooming, dressing, etc.)  - Assess/evaluate cause of self-care deficits   - Assess range of motion  - Assess patient's mobility; develop plan if impaired  - Assess patient's need for assistive devices and provide as appropriate  - Encourage maximum independence but intervene and supervise when necessary  - Involve family in performance of ADLs  - Assess for home care needs following discharge   - Consider OT consult to assist with ADL evaluation and planning for discharge  - Provide patient education as appropriate  Outcome: Progressing  Goal: Maintains/Returns to pre admission functional level  Description: INTERVENTIONS:  - Perform AM-PAC 6 Click Basic Mobility/ Daily Activity assessment daily.  - Set and communicate daily mobility goal to care team and patient/family/caregiver.   - Collaborate with rehabilitation services on mobility goals if consulted  - Perform Range of Motion 3 times a day.  - Reposition patient every 2 hours.  - Dangle patient 3 times a day  - Stand patient 3 times a day  - Ambulate patient 3 times a day  - Out of bed to chair 3 times a day   - Out of bed for meals 3 times a day  - Out of bed for toileting  - Record patient progress and toleration of activity level   Outcome: Progressing     Problem: DISCHARGE PLANNING  Goal: Discharge to home or other facility with appropriate resources  Description: INTERVENTIONS:  - Identify barriers to discharge w/patient and  caregiver  - Arrange for needed discharge resources and transportation as appropriate  - Identify discharge learning needs (meds, wound care, etc.)  - Arrange for interpretive services to assist at discharge as needed  - Refer to Case Management Department for coordinating discharge planning if the patient needs post-hospital services based on physician/advanced practitioner order or complex needs related to functional status, cognitive ability, or social support system  Outcome: Progressing     Problem: Knowledge Deficit  Goal: Patient/family/caregiver demonstrates understanding of disease process, treatment plan, medications, and discharge instructions  Description: Complete learning assessment and assess knowledge base.  Interventions:  - Provide teaching at level of understanding  - Provide teaching via preferred learning methods  Outcome: Progressing     Problem: Prexisting or High Potential for Compromised Skin Integrity  Goal: Skin integrity is maintained or improved  Description: INTERVENTIONS:  - Identify patients at risk for skin breakdown  - Assess and monitor skin integrity  - Assess and monitor nutrition and hydration status  - Monitor labs   - Assess for incontinence   - Turn and reposition patient  - Assist with mobility/ambulation  - Relieve pressure over bony prominences  - Avoid friction and shearing  - Provide appropriate hygiene as needed including keeping skin clean and dry  - Evaluate need for skin moisturizer/barrier cream  - Collaborate with interdisciplinary team   - Patient/family teaching  - Consider wound care consult   Outcome: Progressing     Problem: Nutrition/Hydration-ADULT  Goal: Nutrient/Hydration intake appropriate for improving, restoring or maintaining nutritional needs  Description: Monitor and assess patient's nutrition/hydration status for malnutrition. Collaborate with interdisciplinary team and initiate plan and interventions as ordered.  Monitor patient's weight and  dietary intake as ordered or per policy. Utilize nutrition screening tool and intervene as necessary. Determine patient's food preferences and provide high-protein, high-caloric foods as appropriate.     INTERVENTIONS:  - Monitor oral intake, urinary output, labs, and treatment plans  - Assess nutrition and hydration status and recommend course of action  - Evaluate amount of meals eaten  - Assist patient with eating if necessary   - Allow adequate time for meals  - Recommend/ encourage appropriate diets, oral nutritional supplements, and vitamin/mineral supplements  - Order, calculate, and assess calorie counts as needed  - Recommend, monitor, and adjust tube feedings and TPN/PPN based on assessed needs  - Assess need for intravenous fluids  - Provide specific nutrition/hydration education as appropriate  - Include patient/family/caregiver in decisions related to nutrition  Outcome: Progressing

## 2024-12-31 NOTE — OCCUPATIONAL THERAPY NOTE
Occupational Therapy Screen     Patient Name: Zee Kirk  Today's Date: 12/31/2024  Problem List  Principal Problem:    Weakness  Active Problems:    Hypothyroid    Class 3 severe obesity due to excess calories with serious comorbidity and body mass index (BMI) greater than or equal to 70 in adult ([facility])    Type 2 diabetes mellitus without complication, without long-term current use of insulin ([facility])    Paroxysmal A-fib ([facility])    Bilateral lower extremity edema    Hypotension    Past Medical History  Past Medical History:   Diagnosis Date    Atrial fibrillation ([facility])     Bladder stone     C. difficile colitis     Cataract, nuclear sclerotic senile, right 12/05/2024    Cellulitis     CHF (congestive heart failure) ([facility])     Depression with anxiety     Disease of thyroid gland     Diverticulitis     Enterocolitis     History of abnormal cervical Pap smear     Kidney stone     Lymphedema     Menopause     Age 50    Morbid obesity with BMI of 70 and over, adult ([facility])     MRSA (methicillin resistant Staphylococcus aureus)     abdominal wound    Osteoarthritis     Phlebitis     left lower leg    Sleep apnea     Spondylolysis      Past Surgical History  Past Surgical History:   Procedure Laterality Date    APPENDECTOMY      CARPAL TUNNEL RELEASE      CATARACT EXTRACTION Right     CHOLECYSTECTOMY      CYSTOSCOPY      stent    EGD      FOOT SURGERY  1982    bone spur    KNEE SURGERY      NM ICAPSULAR CATARACT XTRJ INSJ IO LENS PRSTH 1 STG Right 12/05/2024    Procedure: CATARACT EXTRACTION WITH IOL IMPLANTATION;  Surgeon: Chantel Llanos MD;  Location: [facility];  Service: Ophthalmology    WISDOM TOOTH EXTRACTION           12/31/24 1250   OT Last Visit   OT Visit Date 12/31/24   Note Type   Note type Screen   Additional Comments Pt is functioning at baseline status. Pt has been bed bound since 2020 and has caregivers 12 hours/day. No acute OT needs indicated nor appropriate at this time. Re-consult as  appropriate.         Gay Cao, OTR/L

## 2025-01-01 VITALS
TEMPERATURE: 98.4 F | OXYGEN SATURATION: 94 % | BODY MASS INDEX: 77.57 KG/M2 | HEART RATE: 61 BPM | SYSTOLIC BLOOD PRESSURE: 108 MMHG | WEIGHT: 293 LBS | RESPIRATION RATE: 18 BRPM | DIASTOLIC BLOOD PRESSURE: 47 MMHG

## 2025-01-01 LAB
ANION GAP SERPL CALCULATED.3IONS-SCNC: 6 MMOL/L (ref 4–13)
BASOPHILS # BLD AUTO: 0.05 THOUSANDS/ΜL (ref 0–0.1)
BASOPHILS NFR BLD AUTO: 0 % (ref 0–1)
BUN SERPL-MCNC: 23 MG/DL (ref 5–25)
CALCIUM SERPL-MCNC: 8.3 MG/DL (ref 8.4–10.2)
CHLORIDE SERPL-SCNC: 98 MMOL/L (ref 96–108)
CO2 SERPL-SCNC: 36 MMOL/L (ref 21–32)
CREAT SERPL-MCNC: 1.1 MG/DL (ref 0.6–1.3)
EOSINOPHIL # BLD AUTO: 0.37 THOUSAND/ΜL (ref 0–0.61)
EOSINOPHIL NFR BLD AUTO: 3 % (ref 0–6)
ERYTHROCYTE [DISTWIDTH] IN BLOOD BY AUTOMATED COUNT: 16.9 % (ref 11.6–15.1)
GFR SERPL CREATININE-BSD FRML MDRD: 49 ML/MIN/1.73SQ M
GLUCOSE SERPL-MCNC: 108 MG/DL (ref 65–140)
HCT VFR BLD AUTO: 32.6 % (ref 34.8–46.1)
HGB BLD-MCNC: 8.8 G/DL (ref 11.5–15.4)
IMM GRANULOCYTES # BLD AUTO: 0.04 THOUSAND/UL (ref 0–0.2)
IMM GRANULOCYTES NFR BLD AUTO: 0 % (ref 0–2)
INR PPP: 1.86 (ref 0.85–1.19)
LYMPHOCYTES # BLD AUTO: 0.97 THOUSANDS/ΜL (ref 0.6–4.47)
LYMPHOCYTES NFR BLD AUTO: 9 % (ref 14–44)
MAGNESIUM SERPL-MCNC: 2.1 MG/DL (ref 1.9–2.7)
MCH RBC QN AUTO: 24 PG (ref 26.8–34.3)
MCHC RBC AUTO-ENTMCNC: 27 G/DL (ref 31.4–37.4)
MCV RBC AUTO: 89 FL (ref 82–98)
MONOCYTES # BLD AUTO: 0.54 THOUSAND/ΜL (ref 0.17–1.22)
MONOCYTES NFR BLD AUTO: 5 % (ref 4–12)
NEUTROPHILS # BLD AUTO: 9.41 THOUSANDS/ΜL (ref 1.85–7.62)
NEUTS SEG NFR BLD AUTO: 83 % (ref 43–75)
NRBC BLD AUTO-RTO: 0 /100 WBCS
PHOSPHATE SERPL-MCNC: 3.4 MG/DL (ref 2.3–4.1)
PLATELET # BLD AUTO: 412 THOUSANDS/UL (ref 149–390)
PMV BLD AUTO: 8.6 FL (ref 8.9–12.7)
POTASSIUM SERPL-SCNC: 3.5 MMOL/L (ref 3.5–5.3)
PROTHROMBIN TIME: 21.9 SECONDS (ref 12.3–15)
RBC # BLD AUTO: 3.66 MILLION/UL (ref 3.81–5.12)
SODIUM SERPL-SCNC: 140 MMOL/L (ref 135–147)
WBC # BLD AUTO: 11.38 THOUSAND/UL (ref 4.31–10.16)

## 2025-01-01 PROCEDURE — 84100 ASSAY OF PHOSPHORUS: CPT | Performed by: INTERNAL MEDICINE

## 2025-01-01 PROCEDURE — 83735 ASSAY OF MAGNESIUM: CPT | Performed by: INTERNAL MEDICINE

## 2025-01-01 PROCEDURE — 85025 COMPLETE CBC W/AUTO DIFF WBC: CPT | Performed by: INTERNAL MEDICINE

## 2025-01-01 PROCEDURE — 99239 HOSP IP/OBS DSCHRG MGMT >30: CPT

## 2025-01-01 PROCEDURE — 80048 BASIC METABOLIC PNL TOTAL CA: CPT | Performed by: INTERNAL MEDICINE

## 2025-01-01 PROCEDURE — 85610 PROTHROMBIN TIME: CPT

## 2025-01-01 RX ORDER — MIDODRINE HYDROCHLORIDE 5 MG/1
5 TABLET ORAL
Qty: 90 TABLET | Refills: 0 | Status: SHIPPED | OUTPATIENT
Start: 2025-01-01

## 2025-01-01 RX ADMIN — PREDNISOLONE ACETATE 1 DROP: 10 SUSPENSION/ DROPS OPHTHALMIC at 09:55

## 2025-01-01 RX ADMIN — ALLOPURINOL 100 MG: 100 TABLET ORAL at 09:54

## 2025-01-01 RX ADMIN — MIDODRINE HYDROCHLORIDE 5 MG: 5 TABLET ORAL at 06:33

## 2025-01-01 RX ADMIN — GABAPENTIN 200 MG: 100 CAPSULE ORAL at 09:54

## 2025-01-01 RX ADMIN — DOCUSATE SODIUM 100 MG: 100 CAPSULE, LIQUID FILLED ORAL at 09:54

## 2025-01-01 RX ADMIN — LEVOTHYROXINE SODIUM 137 MCG: 25 TABLET ORAL at 06:33

## 2025-01-01 NOTE — ASSESSMENT & PLAN NOTE
Likely due to holding Lasix prehospital  Fluid status monitored closely throughout and diuresed accordingly  Euvolemic on exam today, leg edema improved  Continue prehospital diuretic regimen  Creased midodrine from 2.5 mg 3 times a day to 5 mg 3 times a day to help support blood pressure-did extensive education on importance of taking midodrine to support her blood pressure so she can take her Lasix, otherwise will have volume overload and likely require readmission.  Patient expressed understanding of same

## 2025-01-01 NOTE — NURSING NOTE
Patient IV removed with tip in tact. RN reviewed patient discharge AVS and all questions were answered in a timely manner. Patient transported to home via BLS

## 2025-01-01 NOTE — DISCHARGE INSTR - AVS FIRST PAGE
Please follow up with your primary care physician within a week of discharge  I am increasing your midodrine to 5mg three times a day. A prescription has been sent electronically to your pharmacy for this medication. It is important to take this medication because it will increase your blood pressure so you can take your lasix, otherwise you will become volume overloaded  I have made no other changes to your pre hospital medications      It has been a pleasure taking care of you Jyoti. I wish you all the best on discharge and happy New Year!  Ashley NARAYANAN

## 2025-01-01 NOTE — PLAN OF CARE
Problem: PAIN - ADULT  Goal: Verbalizes/displays adequate comfort level or baseline comfort level  Description: Interventions:  - Encourage patient to monitor pain and request assistance  - Assess pain using appropriate pain scale  - Administer analgesics based on type and severity of pain and evaluate response  - Implement non-pharmacological measures as appropriate and evaluate response  - Consider cultural and social influences on pain and pain management  - Notify physician/advanced practitioner if interventions unsuccessful or patient reports new pain  Outcome: Progressing     Problem: INFECTION - ADULT  Goal: Absence or prevention of progression during hospitalization  Description: INTERVENTIONS:  - Assess and monitor for signs and symptoms of infection  - Monitor lab/diagnostic results  - Monitor all insertion sites, i.e. indwelling lines, tubes, and drains  - Monitor endotracheal if appropriate and nasal secretions for changes in amount and color  - [place] appropriate cooling/warming therapies per order  - Administer medications as ordered  - Instruct and encourage patient and family to use good hand hygiene technique  - Identify and instruct in appropriate isolation precautions for identified infection/condition  Outcome: Progressing     Problem: SAFETY ADULT  Goal: Maintain or return to baseline ADL function  Description: INTERVENTIONS:  -  Assess patient's ability to carry out ADLs; assess patient's baseline for ADL function and identify physical deficits which impact ability to perform ADLs (bathing, care of mouth/teeth, toileting, grooming, dressing, etc.)  - Assess/evaluate cause of self-care deficits   - Assess range of motion  - Assess patient's mobility; develop plan if impaired  - Assess patient's need for assistive devices and provide as appropriate  - Encourage maximum independence but intervene and supervise when necessary  - Involve family in performance of ADLs  - Assess for home care  needs following discharge   - Consider OT consult to assist with ADL evaluation and planning for discharge  - Provide patient education as appropriate  Outcome: Progressing     Problem: Knowledge Deficit  Goal: Patient/family/caregiver demonstrates understanding of disease process, treatment plan, medications, and discharge instructions  Description: Complete learning assessment and assess knowledge base.  Interventions:  - Provide teaching at level of understanding  - Provide teaching via preferred learning methods  Outcome: Progressing     Problem: Prexisting or High Potential for Compromised Skin Integrity  Goal: Skin integrity is maintained or improved  Description: INTERVENTIONS:  - Identify patients at risk for skin breakdown  - Assess and monitor skin integrity  - Assess and monitor nutrition and hydration status  - Monitor labs   - Assess for incontinence   - Turn and reposition patient  - Assist with mobility/ambulation  - Relieve pressure over bony prominences  - Avoid friction and shearing  - Provide appropriate hygiene as needed including keeping skin clean and dry  - Evaluate need for skin moisturizer/barrier cream  - Collaborate with interdisciplinary team   - Patient/family teaching  - Consider wound care consult   Outcome: Progressing

## 2025-01-01 NOTE — ASSESSMENT & PLAN NOTE
Presents with generalized weakness of unclear etiology  Possibly due to hypotension  Bedbound at baseline  Medically stable for discharge today-case management has made caretakers aware of 11 AM pickup

## 2025-01-01 NOTE — CASE MANAGEMENT
Case Management Discharge Planning Note    Patient name Zee Kirk  Location [facility]  MRN [id number]  : 1950 Date 2024       Current Admission Date: 2024  Current Admission Diagnosis:Weakness   Patient Active Problem List    Diagnosis Date Noted Date Diagnosed    Bilateral lower extremity edema 2024     Hypotension 2024     Weakness 2024     Paroxysmal A-fib ([facility]) 2024     History of kidney stones 2024     Skin abnormalities 2024     Cataract, nuclear sclerotic senile, right 2024     Acute on chronic heart failure with preserved ejection fraction (HFpEF) ([facility]) 2024     Hypoxic episode 2024     Type 2 diabetes mellitus without complication, without long-term current use of insulin ([facility]) 2024     Long term (current) use of anticoagulants 2024     Subtherapeutic international normalized ratio (INR) 2024     Multiple open wounds 2024     Acute respiratory failure with hypoxia and hypercapnia ([facility]) 2024     Chest discomfort 2024     Left leg pain 2023     Shingles 2023     Cellulitis- Ruled Out 2023     Longstanding persistent atrial fibrillation ([facility]) 2022     History of Clostridioides difficile infection 2022     Hypothyroid 10/03/2020     Mild episode of recurrent major depressive disorder ([facility]) 10/03/2020     Idiopathic chronic 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 10/03/2020     Class 3 severe obesity due to excess calories with serious comorbidity and body mass index (BMI) greater than or equal to 70 in adult ([facility]) 10/03/2020     Acute on chronic diastolic congestive heart failure ([facility]) 10/03/2020     Panniculitis 10/03/2020     Gastroesophageal reflux disease without esophagitis 10/03/2020     Hx MRSA infection 2020     Impaired mobility 2019     Osteoarthritis of glenohumeral joint 2019     Left ovarian cyst 2017     Depression  with anxiety 07/15/2017     Anemia 12/28/2016     Adjustment disorder 09/05/2013     Irritable bowel syndrome 12/04/2012     Left atrial enlargement 12/04/2012     Left ventricular hypertrophy 12/04/2012     Carpal tunnel syndrome 05/30/2012     Gout 05/30/2012     Hyperlipidemia 05/30/2012     Symptomatic menopausal or female climacteric states 05/30/2012       LOS (days): 1  Geometric Mean LOS (GMLOS) (days): 2.7  Days to GMLOS:1.4     OBJECTIVE:  Risk of Unplanned Readmission Score: 32.54         Current admission status: Inpatient   Preferred Pharmacy:   [facility] [place] - [id number]  [address]  [place] [address]  Phone: [phone] Fax: [phone]    Primary Care Provider: Franklyn Caruso MD    Primary Insurance: MEDICARE  Secondary Insurance: [facility]    DISCHARGE DETAILS:    Discharge planning discussed with:: pt & nephew was called at 13:59pm LM & he called back at 14:30pm & Byron was callex d10:09 am & 11:18 am  Freedom of Choice: Yes  Comments - Freedom of Choice: pt denies any d/c needs- pt's,caregiver will be able to start 1/1/25 at 11 am  CM contacted family/caregiver?: Yes             Contacts  Patient Contacts: Robbie Richmond  Relationship to Patient:: Family (nephew)  Contact Method: Phone  Phone Number: [phone] Kendra manriquez [phone]  Reason/Outcome: Discharge Planning    Requested Home Health Care         Is the patient interested in HHC at discharge?: No         Other Referral/Resources/Interventions Provided:  Interventions: Other (Specify)  Referral Comments: [facility]  11 am [facility]- paperwork in pt's -  if pt is not able to be d/c then the transport needs to be cx & caregiver needs to be called    Would you like to participate in our [facility] service program?  : No - Declined    Treatment Team Recommendation: Home (umu with family support & [place] Caregiver & outpt  follow up- 11 am CRYSTAL Rollins)     Transport at Discharge : [facility]     Number/Name of Dispatcher: [phone]  Transported by (Company and Unit #): [employer]  ETA of Transport (Date): 12/31/24  ETA of Transport (Time): 1100      Pt & nephew are in agreement with the d/c plan        IMM Given (Date):: 12/31/24  IMM Given to:: Patient  Family notified:: nephew was called

## 2025-01-01 NOTE — DISCHARGE SUMMARY
Discharge Summary - Hospitalist   Name: Zee Kirk 74 y.o. female I MRN: [id number]  Unit/Bed#: -01 I Date of Admission: 12/29/2024   Date of Service: 1/1/2025 I Hospital Day: 2     Assessment & Plan  Weakness  Presents with generalized weakness of unclear etiology  Possibly due to hypotension  Bedbound at baseline  Medically stable for discharge today-case management has made caretakers aware of 11 AM pickup    Paroxysmal A-fib (HCC)  Rate controlled not on AV kiko blockade  INR results reviewed-patient is monitored by cardiology in outpatient setting  Continue prehospital Coumadin  Type 2 diabetes mellitus without complication, without long-term current use of insulin (HCC)  Lab Results   Component Value Date    HGBA1C 7.2 (H) 11/06/2024       Recent Labs     12/29/24  1609 12/29/24 2017 12/30/24  0703   POCGLU 93 121 101       Blood Sugar Average: Last 72 hrs:  (P) 105    Blood sugars have been well-controlled  Not on oral hypoglycemics in outpatient setting  Hypothyroid  Continue home levothyroxine on discharge    Class 3 severe obesity due to excess calories with serious comorbidity and body mass index (BMI) greater than or equal to 70 in adult (HCC)  Present on admission as evidenced by BMI of 76  Encouraged lifestyle modifications and weight loss  Bilateral lower extremity edema  Likely due to holding Lasix prehospital  Fluid status monitored closely throughout and diuresed accordingly  Euvolemic on exam today, leg edema improved  Continue prehospital diuretic regimen  Creased midodrine from 2.5 mg 3 times a day to 5 mg 3 times a day to help support blood pressure-did extensive education on importance of taking midodrine to support her blood pressure so she can take her Lasix, otherwise will have volume overload and likely require readmission.  Patient expressed understanding of same  Hypotension  Patient presented with generalized weakness, noted to be hypotensive  Blood pressure is a bit  soft today but patient asymptomatic  Will continue midodrine at increased dose of 5 mg 3 times a day on discharge     Medical Problems       Resolved Problems  Date Reviewed: 12/31/2024   None       Discharging Physician / Practitioner: ORACIO Last  PCP: Franklyn Caruso MD  Admission Date:   Admission Orders (From admission, onward)       Ordered        12/30/24 1153  INPATIENT ADMISSION  Once            12/29/24 1423  Place in Observation  Once                          Discharge Date: 01/01/25    Consultations During Hospital Stay:  None    Procedures Performed:   None    Significant Findings / Test Results:   12/29 chest x-ray: No acute cardiopulmonary disease    Incidental Findings:   None    Test Results Pending at Discharge (will require follow up):   None     Outpatient Tests Requested:  None    Complications: None    Reason for Admission: Weakness    Hospital Course:   Zee Kirk is a 74 y.o. female patient who originally presented to the hospital on 12/29/2024 due to generalized weakness of unclear etiology.  Patient is bedbound at baseline.  Likely related to hypotension.  Patient reports she is on midodrine but has not been taking it routinely.  Patient also reported that she has been holding her Lasix at home due to her blood pressure.  We discussed midodrine at length and advised her to take her midodrine to help support her blood pressure so she could take her Lasix as ordered.  Midodrine was increased from 2.5 mg 3 times a day to 5 mg 3 times a day on discharge.  Blood pressures are a bit soft but patient is asymptomatic.  Fluid status was monitored closely throughout and diuresed accordingly.  She is euvolemic on exam today.  She will be discharged home on her prehospital diuretic regimen with midodrine increased as previously mentioned.  She will follow-up with her PCP within a week.  Case management has made her caretakers aware that she will be discharged home  today.          Please see above list of diagnoses and related plan for additional information.     Condition at Discharge: good    Discharge Day Visit / Exam:   Subjective: Denies any dizziness, lightness, chest pain or palpitations.  Denies pain or discomfort.  Vitals: Blood Pressure: (!) 108/47 (01/01/25 0950)  Pulse: 61 (01/01/25 0950)  Temperature: 98.4 °F (36.9 °C) (01/01/25 0701)  Temp Source: Oral (01/01/25 0701)  Respirations: 18 (01/01/25 0701)  Weight - Scale: (!) 205 kg (451 lb 14.4 oz) (01/01/25 0541)  SpO2: 94 % (01/01/25 0950)  Physical Exam  Vitals reviewed.   Constitutional:       General: She is not in acute distress.     Appearance: She is well-developed. She is obese. She is not ill-appearing.   HENT:      Head: Normocephalic and atraumatic.   Cardiovascular:      Rate and Rhythm: Normal rate. Rhythm irregular.      Pulses: Normal pulses.      Heart sounds: Normal heart sounds. No murmur heard.  Pulmonary:      Effort: Pulmonary effort is normal. No respiratory distress.      Breath sounds: Normal breath sounds. No wheezing or rales.   Abdominal:      General: Bowel sounds are normal. There is no distension.      Palpations: Abdomen is soft.      Tenderness: There is no abdominal tenderness. There is no guarding.   Musculoskeletal:         General: No swelling.   Skin:     General: Skin is warm and dry.      Capillary Refill: Capillary refill takes less than 2 seconds.   Neurological:      General: No focal deficit present.      Mental Status: She is alert and oriented to person, place, and time. Mental status is at baseline.   Psychiatric:         Mood and Affect: Mood normal.         Behavior: Behavior normal.         Thought Content: Thought content normal.         Judgment: Judgment normal.          Discussion with Family: Patient declined call to .     Discharge instructions/Information to patient and family:   See after visit summary for information provided to patient and  family.      Provisions for Follow-Up Care:  See after visit summary for information related to follow-up care and any pertinent home health orders.      Mobility at time of Discharge:   Basic Mobility Inpatient Raw Score: 6  JH-HLM Goal: 2: Bed activities/Dependent transfer  JH-HLM Achieved: 2: Bed activities/Dependent transfer  HLM Goal achieved. Continue to encourage appropriate mobility.     Disposition:   Home    Planned Readmission: No    Discharge Medications:  See after visit summary for reconciled discharge medications provided to patient and/or family.      Administrative Statements   Discharge Statement:  I have spent a total time of 37 minutes in caring for this patient on the day of the visit/encounter. >30 minutes of time was spent on: Patient and family education, Counseling / Coordination of care, Documenting in the medical record, Reviewing / ordering tests, medicine, procedures  , and Communicating with other healthcare professionals .    **Please Note: This note may have been constructed using a voice recognition system**

## 2025-01-01 NOTE — ASSESSMENT & PLAN NOTE
Lab Results   Component Value Date    HGBA1C 7.2 (H) 11/06/2024       Recent Labs     12/29/24  1609 12/29/24 2017 12/30/24  0703   POCGLU 93 121 101       Blood Sugar Average: Last 72 hrs:  (P) 105    Blood sugars have been well-controlled  Not on oral hypoglycemics in outpatient setting

## 2025-01-01 NOTE — PLAN OF CARE
Problem: PAIN - ADULT  Goal: Verbalizes/displays adequate comfort level or baseline comfort level  Description: Interventions:  - Encourage patient to monitor pain and request assistance  - Assess pain using appropriate pain scale  - Administer analgesics based on type and severity of pain and evaluate response  - Implement non-pharmacological measures as appropriate and evaluate response  - Consider cultural and social influences on pain and pain management  - Notify physician/advanced practitioner if interventions unsuccessful or patient reports new pain  Outcome: Progressing     Problem: INFECTION - ADULT  Goal: Absence or prevention of progression during hospitalization  Description: INTERVENTIONS:  - Assess and monitor for signs and symptoms of infection  - Monitor lab/diagnostic results  - Monitor all insertion sites, i.e. indwelling lines, tubes, and drains  - Monitor endotracheal if appropriate and nasal secretions for changes in amount and color  - [place] appropriate cooling/warming therapies per order  - Administer medications as ordered  - Instruct and encourage patient and family to use good hand hygiene technique  - Identify and instruct in appropriate isolation precautions for identified infection/condition  Outcome: Progressing  Goal: Absence of fever/infection during neutropenic period  Description: INTERVENTIONS:  - Monitor WBC    Outcome: Progressing     Problem: SAFETY ADULT  Goal: Patient will remain free of falls  Description: INTERVENTIONS:  - Educate patient/family on patient safety including physical limitations  - Instruct patient to call for assistance with activity   - Consult OT/PT to assist with strengthening/mobility   - Keep Call bell within reach  - Keep bed low and locked with side rails adjusted as appropriate  - Keep care items and personal belongings within reach  - Initiate and maintain comfort rounds  - Make Fall Risk Sign visible to staff  - Offer Toileting every 2 Hours, in  advance of need  - Initiate/Maintain bed alarm  - Obtain necessary fall risk management equipment  - Apply yellow socks and bracelet for high fall risk patients  - Consider moving patient to room near nurses station  Outcome: Progressing  Goal: Maintain or return to baseline ADL function  Description: INTERVENTIONS:  -  Assess patient's ability to carry out ADLs; assess patient's baseline for ADL function and identify physical deficits which impact ability to perform ADLs (bathing, care of mouth/teeth, toileting, grooming, dressing, etc.)  - Assess/evaluate cause of self-care deficits   - Assess range of motion  - Assess patient's mobility; develop plan if impaired  - Assess patient's need for assistive devices and provide as appropriate  - Encourage maximum independence but intervene and supervise when necessary  - Involve family in performance of ADLs  - Assess for home care needs following discharge   - Consider OT consult to assist with ADL evaluation and planning for discharge  - Provide patient education as appropriate  Outcome: Progressing  Goal: Maintains/Returns to pre admission functional level  Description: INTERVENTIONS:  - Perform AM-PAC 6 Click Basic Mobility/ Daily Activity assessment daily.  - Set and communicate daily mobility goal to care team and patient/family/caregiver.   - Collaborate with rehabilitation services on mobility goals if consulted  - Perform Range of Motion 3 times a day.  - Reposition patient every 2 hours.  - Dangle patient 3 times a day  - Stand patient 3 times a day  - Ambulate patient 3 times a day  - Out of bed to chair 3 times a day   - Out of bed for meals 3 times a day  - Out of bed for toileting  - Record patient progress and toleration of activity level   Outcome: Progressing     Problem: DISCHARGE PLANNING  Goal: Discharge to home or other facility with appropriate resources  Description: INTERVENTIONS:  - Identify barriers to discharge w/patient and caregiver  -  Arrange for needed discharge resources and transportation as appropriate  - Identify discharge learning needs (meds, wound care, etc.)  - Arrange for interpretive services to assist at discharge as needed  - Refer to Case Management Department for coordinating discharge planning if the patient needs post-hospital services based on physician/advanced practitioner order or complex needs related to functional status, cognitive ability, or social support system  Outcome: Progressing     Problem: Knowledge Deficit  Goal: Patient/family/caregiver demonstrates understanding of disease process, treatment plan, medications, and discharge instructions  Description: Complete learning assessment and assess knowledge base.  Interventions:  - Provide teaching at level of understanding  - Provide teaching via preferred learning methods  Outcome: Progressing     Problem: Prexisting or High Potential for Compromised Skin Integrity  Goal: Skin integrity is maintained or improved  Description: INTERVENTIONS:  - Identify patients at risk for skin breakdown  - Assess and monitor skin integrity  - Assess and monitor nutrition and hydration status  - Monitor labs   - Assess for incontinence   - Turn and reposition patient  - Assist with mobility/ambulation  - Relieve pressure over bony prominences  - Avoid friction and shearing  - Provide appropriate hygiene as needed including keeping skin clean and dry  - Evaluate need for skin moisturizer/barrier cream  - Collaborate with interdisciplinary team   - Patient/family teaching  - Consider wound care consult   Outcome: Progressing     Problem: Nutrition/Hydration-ADULT  Goal: Nutrient/Hydration intake appropriate for improving, restoring or maintaining nutritional needs  Description: Monitor and assess patient's nutrition/hydration status for malnutrition. Collaborate with interdisciplinary team and initiate plan and interventions as ordered.  Monitor patient's weight and dietary intake as  ordered or per policy. Utilize nutrition screening tool and intervene as necessary. Determine patient's food preferences and provide high-protein, high-caloric foods as appropriate.     INTERVENTIONS:  - Monitor oral intake, urinary output, labs, and treatment plans  - Assess nutrition and hydration status and recommend course of action  - Evaluate amount of meals eaten  - Assist patient with eating if necessary   - Allow adequate time for meals  - Recommend/ encourage appropriate diets, oral nutritional supplements, and vitamin/mineral supplements  - Order, calculate, and assess calorie counts as needed  - Recommend, monitor, and adjust tube feedings and TPN/PPN based on assessed needs  - Assess need for intravenous fluids  - Provide specific nutrition/hydration education as appropriate  - Include patient/family/caregiver in decisions related to nutrition  Outcome: Progressing

## 2025-01-01 NOTE — ASSESSMENT & PLAN NOTE
Rate controlled not on AV kiko blockade  INR results reviewed-patient is monitored by cardiology in outpatient setting  Continue prehospital Coumadin

## 2025-01-01 NOTE — ASSESSMENT & PLAN NOTE
Patient presented with generalized weakness, noted to be hypotensive  Blood pressure is a bit soft today but patient asymptomatic  Will continue midodrine at increased dose of 5 mg 3 times a day on discharge

## 2025-01-01 NOTE — ASSESSMENT & PLAN NOTE
Present on admission as evidenced by BMI of 76  Encouraged lifestyle modifications and weight loss   The patient's goals for the shift include      The clinical goals for the shift include pt will remain HDS

## 2025-01-07 LAB
INR PPP: 2.4
PROTHROMBIN TIME: 25.3 SEC (ref 12–14.6)

## 2025-01-08 ENCOUNTER — ANTICOAG VISIT (OUTPATIENT)
Dept: CARDIOLOGY CLINIC | Facility: CLINIC | Age: 75
End: 2025-01-08
Payer: MEDICARE

## 2025-01-08 DIAGNOSIS — Z79.01 LONG TERM (CURRENT) USE OF ANTICOAGULANTS: ICD-10-CM

## 2025-01-08 DIAGNOSIS — I48.11 LONGSTANDING PERSISTENT ATRIAL FIBRILLATION (HCC): Primary | ICD-10-CM

## 2025-01-08 PROCEDURE — 93793 ANTICOAG MGMT PT WARFARIN: CPT | Performed by: PHYSICIAN ASSISTANT

## 2025-01-08 NOTE — PATIENT INSTRUCTIONS
LMAM to adjust dose and recheck in two weeks.  Patient to call if questions, concerns or changes in medication health or diet.  Jessica Mata PA-C

## 2025-01-17 ENCOUNTER — APPOINTMENT (EMERGENCY)
Dept: RADIOLOGY | Facility: HOSPITAL | Age: 75
End: 2025-01-17
Payer: MEDICARE

## 2025-01-17 ENCOUNTER — HOSPITAL ENCOUNTER (EMERGENCY)
Facility: HOSPITAL | Age: 75
Discharge: HOME | End: 2025-01-18
Attending: EMERGENCY MEDICINE
Payer: MEDICARE

## 2025-01-17 DIAGNOSIS — N39.0 UTI (URINARY TRACT INFECTION): ICD-10-CM

## 2025-01-17 DIAGNOSIS — R53.81 MALAISE: Primary | ICD-10-CM

## 2025-01-17 LAB
ALBUMIN SERPL BCG-MCNC: 2.8 G/DL (ref 3.5–5)
ALP SERPL-CCNC: 74 U/L (ref 34–104)
ALT SERPL W P-5'-P-CCNC: 6 U/L (ref 7–52)
ANION GAP SERPL CALCULATED.3IONS-SCNC: 5 MMOL/L (ref 4–13)
AST SERPL W P-5'-P-CCNC: 9 U/L (ref 13–39)
BASOPHILS # BLD AUTO: 0.05 THOUSANDS/ΜL (ref 0–0.1)
BASOPHILS NFR BLD AUTO: 1 % (ref 0–1)
BILIRUB SERPL-MCNC: 0.51 MG/DL (ref 0.2–1)
BUN SERPL-MCNC: 28 MG/DL (ref 5–25)
CALCIUM ALBUM COR SERPL-MCNC: 9.8 MG/DL (ref 8.3–10.1)
CALCIUM SERPL-MCNC: 8.8 MG/DL (ref 8.4–10.2)
CARDIAC TROPONIN I PNL SERPL HS: 9 NG/L (ref ?–50)
CHLORIDE SERPL-SCNC: 98 MMOL/L (ref 96–108)
CO2 SERPL-SCNC: 37 MMOL/L (ref 21–32)
CREAT SERPL-MCNC: 1.32 MG/DL (ref 0.6–1.3)
EOSINOPHIL # BLD AUTO: 0.31 THOUSAND/ΜL (ref 0–0.61)
EOSINOPHIL NFR BLD AUTO: 3 % (ref 0–6)
ERYTHROCYTE [DISTWIDTH] IN BLOOD BY AUTOMATED COUNT: 17.1 % (ref 11.6–15.1)
FLUAV RNA RESP QL NAA+PROBE: NEGATIVE
FLUBV RNA RESP QL NAA+PROBE: NEGATIVE
GFR SERPL CREATININE-BSD FRML MDRD: 40 ML/MIN/1.73SQ M
GLUCOSE SERPL-MCNC: 114 MG/DL (ref 65–140)
HCT VFR BLD AUTO: 35.7 % (ref 34.8–46.1)
HGB BLD-MCNC: 9.8 G/DL (ref 11.5–15.4)
IMM GRANULOCYTES # BLD AUTO: 0.04 THOUSAND/UL (ref 0–0.2)
IMM GRANULOCYTES NFR BLD AUTO: 0 % (ref 0–2)
LYMPHOCYTES # BLD AUTO: 1.03 THOUSANDS/ΜL (ref 0.6–4.47)
LYMPHOCYTES NFR BLD AUTO: 10 % (ref 14–44)
MCH RBC QN AUTO: 24.4 PG (ref 26.8–34.3)
MCHC RBC AUTO-ENTMCNC: 27.5 G/DL (ref 31.4–37.4)
MCV RBC AUTO: 89 FL (ref 82–98)
MONOCYTES # BLD AUTO: 0.44 THOUSAND/ΜL (ref 0.17–1.22)
MONOCYTES NFR BLD AUTO: 4 % (ref 4–12)
NEUTROPHILS # BLD AUTO: 8.56 THOUSANDS/ΜL (ref 1.85–7.62)
NEUTS SEG NFR BLD AUTO: 82 % (ref 43–75)
NRBC BLD AUTO-RTO: 0 /100 WBCS
PLATELET # BLD AUTO: 409 THOUSANDS/UL (ref 149–390)
PMV BLD AUTO: 8.2 FL (ref 8.9–12.7)
POTASSIUM SERPL-SCNC: 3.9 MMOL/L (ref 3.5–5.3)
PROT SERPL-MCNC: 7.3 G/DL (ref 6.4–8.4)
RBC # BLD AUTO: 4.02 MILLION/UL (ref 3.81–5.12)
RSV RNA RESP QL NAA+PROBE: NEGATIVE
SARS-COV-2 RNA RESP QL NAA+PROBE: NEGATIVE
SODIUM SERPL-SCNC: 140 MMOL/L (ref 135–147)
TSH SERPL DL<=0.05 MIU/L-ACNC: 5.78 UIU/ML (ref 0.45–4.5)
WBC # BLD AUTO: 10.43 THOUSAND/UL (ref 4.31–10.16)

## 2025-01-17 PROCEDURE — 85025 COMPLETE CBC W/AUTO DIFF WBC: CPT | Performed by: EMERGENCY MEDICINE

## 2025-01-17 PROCEDURE — 71045 X-RAY EXAM CHEST 1 VIEW: CPT

## 2025-01-17 PROCEDURE — 80053 COMPREHEN METABOLIC PANEL: CPT | Performed by: EMERGENCY MEDICINE

## 2025-01-17 PROCEDURE — 93005 ELECTROCARDIOGRAM TRACING: CPT

## 2025-01-17 PROCEDURE — 96374 THER/PROPH/DIAG INJ IV PUSH: CPT

## 2025-01-17 PROCEDURE — 36415 COLL VENOUS BLD VENIPUNCTURE: CPT | Performed by: EMERGENCY MEDICINE

## 2025-01-17 PROCEDURE — 99284 EMERGENCY DEPT VISIT MOD MDM: CPT

## 2025-01-17 PROCEDURE — 0241U HB NFCT DS VIR RESP RNA 4 TRGT: CPT | Performed by: EMERGENCY MEDICINE

## 2025-01-17 PROCEDURE — 84484 ASSAY OF TROPONIN QUANT: CPT | Performed by: EMERGENCY MEDICINE

## 2025-01-17 PROCEDURE — 84443 ASSAY THYROID STIM HORMONE: CPT | Performed by: EMERGENCY MEDICINE

## 2025-01-17 PROCEDURE — 99284 EMERGENCY DEPT VISIT MOD MDM: CPT | Performed by: EMERGENCY MEDICINE

## 2025-01-17 RX ORDER — DIPHENHYDRAMINE HYDROCHLORIDE 50 MG/ML
25 INJECTION INTRAMUSCULAR; INTRAVENOUS ONCE
Status: COMPLETED | OUTPATIENT
Start: 2025-01-17 | End: 2025-01-17

## 2025-01-17 RX ADMIN — DIPHENHYDRAMINE HYDROCHLORIDE 25 MG: 50 INJECTION, SOLUTION INTRAMUSCULAR; INTRAVENOUS at 19:18

## 2025-01-18 ENCOUNTER — HOSPITAL ENCOUNTER (EMERGENCY)
Facility: HOSPITAL | Age: 75
Discharge: HOME | End: 2025-01-19
Attending: EMERGENCY MEDICINE
Payer: MEDICARE

## 2025-01-18 VITALS
TEMPERATURE: 97.9 F | BODY MASS INDEX: 50.02 KG/M2 | DIASTOLIC BLOOD PRESSURE: 56 MMHG | RESPIRATION RATE: 18 BRPM | OXYGEN SATURATION: 95 % | SYSTOLIC BLOOD PRESSURE: 114 MMHG | HEART RATE: 63 BPM | HEIGHT: 64 IN | WEIGHT: 293 LBS

## 2025-01-18 VITALS
TEMPERATURE: 97.6 F | HEART RATE: 62 BPM | RESPIRATION RATE: 16 BRPM | DIASTOLIC BLOOD PRESSURE: 62 MMHG | OXYGEN SATURATION: 93 % | SYSTOLIC BLOOD PRESSURE: 112 MMHG

## 2025-01-18 DIAGNOSIS — N39.0 UTI (URINARY TRACT INFECTION): Primary | ICD-10-CM

## 2025-01-18 LAB
2HR DELTA HS TROPONIN: 1 NG/L
ATRIAL RATE: 326 BPM
BACTERIA UR QL AUTO: ABNORMAL /HPF
BILIRUB UR QL STRIP: NEGATIVE
CARDIAC TROPONIN I PNL SERPL HS: 10 NG/L (ref ?–50)
CLARITY UR: ABNORMAL
COLOR UR: YELLOW
GLUCOSE UR STRIP-MCNC: NEGATIVE MG/DL
HGB UR QL STRIP.AUTO: ABNORMAL
KETONES UR STRIP-MCNC: NEGATIVE MG/DL
LEUKOCYTE ESTERASE UR QL STRIP: ABNORMAL
MUCOUS THREADS UR QL AUTO: ABNORMAL
NITRITE UR QL STRIP: POSITIVE
NON-SQ EPI CELLS URNS QL MICRO: ABNORMAL /HPF
PH UR STRIP.AUTO: 6 [PH]
PROT UR STRIP-MCNC: NEGATIVE MG/DL
QRS AXIS: 63 DEGREES
QRSD INTERVAL: 68 MS
QT INTERVAL: 468 MS
QTC INTERVAL: 431 MS
RBC #/AREA URNS AUTO: ABNORMAL /HPF
SP GR UR STRIP.AUTO: 1.02
T WAVE AXIS: 206 DEGREES
UROBILINOGEN UR QL STRIP.AUTO: 0.2 E.U./DL
VENTRICULAR RATE: 51 BPM
WBC #/AREA URNS AUTO: ABNORMAL /HPF

## 2025-01-18 PROCEDURE — 87186 SC STD MICRODIL/AGAR DIL: CPT | Performed by: EMERGENCY MEDICINE

## 2025-01-18 PROCEDURE — 84484 ASSAY OF TROPONIN QUANT: CPT | Performed by: EMERGENCY MEDICINE

## 2025-01-18 PROCEDURE — 81001 URINALYSIS AUTO W/SCOPE: CPT | Performed by: EMERGENCY MEDICINE

## 2025-01-18 PROCEDURE — 36415 COLL VENOUS BLD VENIPUNCTURE: CPT | Performed by: EMERGENCY MEDICINE

## 2025-01-18 PROCEDURE — 96372 THER/PROPH/DIAG INJ SC/IM: CPT

## 2025-01-18 PROCEDURE — 99282 EMERGENCY DEPT VISIT SF MDM: CPT

## 2025-01-18 PROCEDURE — 87077 CULTURE AEROBIC IDENTIFY: CPT | Performed by: EMERGENCY MEDICINE

## 2025-01-18 PROCEDURE — 93010 ELECTROCARDIOGRAM REPORT: CPT | Performed by: INTERNAL MEDICINE

## 2025-01-18 PROCEDURE — 99284 EMERGENCY DEPT VISIT MOD MDM: CPT | Performed by: EMERGENCY MEDICINE

## 2025-01-18 PROCEDURE — 87086 URINE CULTURE/COLONY COUNT: CPT | Performed by: EMERGENCY MEDICINE

## 2025-01-18 RX ORDER — CEFUROXIME AXETIL 250 MG/1
500 TABLET ORAL EVERY 12 HOURS SCHEDULED
Status: DISCONTINUED | OUTPATIENT
Start: 2025-01-18 | End: 2025-01-18 | Stop reason: HOSPADM

## 2025-01-18 RX ORDER — KETOROLAC TROMETHAMINE 30 MG/ML
15 INJECTION, SOLUTION INTRAMUSCULAR; INTRAVENOUS ONCE
Status: COMPLETED | OUTPATIENT
Start: 2025-01-18 | End: 2025-01-18

## 2025-01-18 RX ORDER — KETOROLAC TROMETHAMINE 30 MG/ML
15 INJECTION, SOLUTION INTRAMUSCULAR; INTRAVENOUS ONCE
Status: DISCONTINUED | OUTPATIENT
Start: 2025-01-18 | End: 2025-01-18

## 2025-01-18 RX ORDER — CEFUROXIME AXETIL 500 MG/1
500 TABLET ORAL EVERY 12 HOURS SCHEDULED
Qty: 14 TABLET | Refills: 0 | Status: SHIPPED | OUTPATIENT
Start: 2025-01-18 | End: 2025-01-25

## 2025-01-18 RX ORDER — CEFUROXIME AXETIL 250 MG/1
500 TABLET ORAL EVERY 12 HOURS SCHEDULED
Status: DISCONTINUED | OUTPATIENT
Start: 2025-01-18 | End: 2025-01-19 | Stop reason: HOSPADM

## 2025-01-18 RX ADMIN — KETOROLAC TROMETHAMINE 15 MG: 30 INJECTION, SOLUTION INTRAMUSCULAR; INTRAVENOUS at 21:13

## 2025-01-18 RX ADMIN — CEFUROXIME AXETIL 500 MG: 250 TABLET ORAL at 21:02

## 2025-01-18 RX ADMIN — CEFUROXIME AXETIL 500 MG: 250 TABLET ORAL at 01:00

## 2025-01-18 NOTE — ED NOTES
Patient will be picked up at 9 am. The patient's caregiver is not at her home and will arrive after 9am.      Bijal Retana RN  01/18/25 0861

## 2025-01-18 NOTE — ED NOTES
"Pt rang call bell stating difficulty breathing despite O2 Sats=93% on RA; pt states chronic use of 2L NC O2 \"when I feel I need it\"; pt placed on 2L NC per her request at this time     Natali Flores RN  01/17/25 2007    "

## 2025-01-18 NOTE — ED NOTES
Complete bed change at this time; pt soiled with feces; pericare provided; new linen; call bell within reach; pt with no further needs at this time; will continue to monitor     Natali Flores RN  01/18/25 3732

## 2025-01-18 NOTE — ED NOTES
"RoundTrip scheduled for pt; informed pt of  time, pt stated \"I can't go home now, my caregiver doesn't come back till 8 in the morning\"; pt states she has too many requirements to not have a caregiver when she goes home; Round Trip  time changed for 8am      Natali Flores RN  01/18/25 0131    "

## 2025-01-18 NOTE — ED PROVIDER NOTES
Time reflects when diagnosis was documented in both MDM as applicable and the Disposition within this note       Time User Action Codes Description Comment    1/17/2025  9:43 PM Aleksey Mcgrath [R53.81] Malaise     1/18/2025 12:17 AM Aleksey Mcgrath [N39.0] UTI (urinary tract infection)           ED Disposition       ED Disposition   Discharge    Condition   Stable    Date/Time   Fri Jan 17, 2025  9:43 PM    Comment   Zee Kirk discharge to home/self care.                   Assessment & Plan       Medical Decision Making  Patient with urinalysis and microscopic analysis consistent with UTI.  Patient has no signs of complicated UTI at this time.  Patient is not pregnant or significantly immunocompromised. No altered mental status, fever, flank pain, or vomiting. Patient is completely nontoxic and able to tolerate p.o. medications without difficulty therefore discharged at this time is reasonable.  Patient is allergic to all antibiotics that are typically given to UTI.  Further chart review demonstrates that patient has tolerated quite a few of the cephalosporins including the 1 that I gave her while she was here.  She is observed prior to leaving.  She did not display any signs of anaphylaxis.    Patient has no flank pain or significant abdominal/adnexal tenderness. Although considered, I estimate extremely low likelihood of alternative diagnoses such as renal stone, cervicitis, PID, ectopic pregnancy, ovarian torsion, Tubo-ovarian abscess, appendicitis, diverticulitis.  Patient informed that this will likely resolve with p.o. antibiotics but there is the potential of this worsening and PCP follow-up is recommended.  Patient was informed that if their symptoms significantly change or worsen that alternative diagnoses may be considered and they should return to the ED. They expressed their understanding, all of their questions were answered and they were agreeable to plan.      Amount and/or  "Complexity of Data Reviewed  Independent Historian: EMS  External Data Reviewed: notes.  Labs: ordered.  Radiology: ordered.    Risk  OTC drugs.  Prescription drug management.             Medications   cefuroxime (CEFTIN) tablet 500 mg (has no administration in time range)   diphenhydrAMINE (BENADRYL) injection 25 mg (25 mg Intravenous Given 1/17/25 1918)       ED Risk Strat Scores                          SBIRT 22yo+      Flowsheet Row Most Recent Value   Initial Alcohol Screen: US AUDIT-C     1. How often do you have a drink containing alcohol? 0 Filed at: 01/17/2025 1846   2. How many drinks containing alcohol do you have on a typical day you are drinking?  0 Filed at: 01/17/2025 1846   3b. FEMALE Any Age, or MALE 65+: How often do you have 4 or more drinks on one occassion? 0 Filed at: 01/17/2025 1846   Audit-C Score 0 Filed at: 01/17/2025 1846   JYOTI: How many times in the past year have you...    Used an illegal drug or used a prescription medication for non-medical reasons? Never Filed at: 01/17/2025 1846                            History of Present Illness       Chief Complaint   Patient presents with    Allergic Reaction     According to the patient, she ate a herbal bun from [employer] and reports itchy leg, SOB and \"scratchy throat.\"  Patient reports that she ate the food at [id number] today.       Past Medical History:   Diagnosis Date    Atrial fibrillation (HCC)     Bladder stone     C. difficile colitis     Cataract, nuclear sclerotic senile, right 12/05/2024    Cellulitis     CHF (congestive heart failure) (HCC)     Depression with anxiety     Disease of thyroid gland     Diverticulitis     Enterocolitis     History of abnormal cervical Pap smear     Kidney stone     Lymphedema     Menopause     Age 50    Morbid obesity with BMI of 70 and over, adult (HCC)     MRSA (methicillin resistant Staphylococcus aureus)     abdominal wound    Osteoarthritis     Phlebitis     left lower leg    Sleep apnea     " Spondylolysis       Past Surgical History:   Procedure Laterality Date    APPENDECTOMY      CARPAL TUNNEL RELEASE      CATARACT EXTRACTION Right     CHOLECYSTECTOMY      CYSTOSCOPY      stent    EGD      FOOT SURGERY      bone spur    KNEE SURGERY      MO ICAPSULAR CATARACT XTRJ INSJ IO LENS PRSTH 1 STG Right 2024    Procedure: CATARACT EXTRACTION WITH IOL IMPLANTATION;  Surgeon: Chantel Llanos MD;  Location: [facility];  Service: Ophthalmology    WISDOM TOOTH EXTRACTION        Family History   Problem Relation Age of Onset    Heart failure Mother     Heart disease Mother     Lymphoma Mother     Seizures Mother     Kidney disease Father     Heart disease Father     Heart failure Father     Diabetes type II Father     Diabetes type II Sister     Diabetes type II Brother     Uterine cancer Paternal Aunt     Breast cancer Neg Hx     Colon cancer Neg Hx     Ovarian cancer Neg Hx       Social History     Tobacco Use    Smoking status: Former     Current packs/day: 0.00     Average packs/day: 5.0 packs/day for 3.0 years (15.0 ttl pk-yrs)     Types: Cigarettes     Start date: 1967     Quit date: 1970     Years since quittin.5    Smokeless tobacco: Never    Tobacco comments:     5 packs/day until  (age 17-19)    Vaping Use    Vaping status: Never Used   Substance Use Topics    Alcohol use: Not Currently    Drug use: Not Currently     Types: Marijuana     Comment: As a teen - As per [employer]       E-Cigarette/Vaping    E-Cigarette Use Never User       E-Cigarette/Vaping Substances    Nicotine No     THC No     CBD No     Flavoring No     Other No     Unknown No       I have reviewed and agree with the history as documented.     Is a 74-year-old female who presents with primary complaint of malaise.  She notes other symptoms, but her primary issue is that she feels unwell.  Patient is well-known to our emergency department and I have met her multiple times personally.  Patient is immobile at  "baseline due to morbid obesity.  Patient reports she has been having chills.  This scratchy throat and other \"allergy\" symptoms have resolved.  Patient at no point had a rash.  She did not identify anything she is specifically allergic to.        Review of Systems   Constitutional:  Positive for activity change, chills and fatigue.   HENT:  Negative for congestion.    Eyes:  Negative for visual disturbance.   Respiratory:  Negative for cough, chest tightness and shortness of breath.    Cardiovascular:  Negative for chest pain.   Gastrointestinal:  Negative for abdominal pain, diarrhea and vomiting.   Genitourinary:  Negative for dysuria.   Skin:  Negative for rash.   Neurological:  Negative for weakness and numbness.           Objective       ED Triage Vitals   Temperature Pulse Blood Pressure Respirations SpO2 Patient Position - Orthostatic VS   01/17/25 1846 01/17/25 1846 01/17/25 1846 01/17/25 1846 01/17/25 1846 01/17/25 1846   97.9 °F (36.6 °C) 61 109/55 18 93 % Lying      Temp Source Heart Rate Source BP Location FiO2 (%) Pain Score    01/17/25 1846 01/17/25 2000 01/17/25 1846 -- 01/17/25 2000    Temporal Monitor Right arm  No Pain      Vitals      Date and Time Temp Pulse SpO2 Resp BP Pain Score FACES Pain Rating User   01/17/25 2000 -- 62 93 % 20 135/60 No Pain -- TB   01/17/25 1846 97.9 °F (36.6 °C) 61 93 % 18 109/55 -- -- [facility] [place]            Physical Exam  Constitutional:       Appearance: She is well-developed. She is obese.   HENT:      Head: Normocephalic and atraumatic.      Right Ear: External ear normal.      Left Ear: External ear normal.      Nose: Nose normal.      Mouth/Throat:      Mouth: Mucous membranes are moist.      Pharynx: Oropharynx is clear.   Eyes:      Conjunctiva/sclera: Conjunctivae normal.      Pupils: Pupils are equal, round, and reactive to light.   Cardiovascular:      Rate and Rhythm: Normal rate and regular rhythm.      Heart sounds: Normal heart sounds.   Pulmonary:      Effort: " Pulmonary effort is normal. No respiratory distress.      Breath sounds: Normal breath sounds.   Abdominal:      General: Bowel sounds are normal. There is no distension.      Palpations: Abdomen is soft.      Tenderness: There is no abdominal tenderness. There is no right CVA tenderness, left CVA tenderness, guarding or rebound.   Musculoskeletal:         General: Normal range of motion.      Cervical back: Normal range of motion and neck supple.   Skin:     General: Skin is warm and dry.      Capillary Refill: Capillary refill takes less than 2 seconds.   Neurological:      General: No focal deficit present.      Mental Status: She is alert and oriented to person, place, and time.         Results Reviewed       Procedure Component Value Units Date/Time    Urine Microscopic [[id number]]  (Abnormal) Collected: 01/18/25 0002    Lab Status: Final result Specimen: Urine, Straight Cath Updated: 01/18/25 0028     RBC, UA 4-10 /hpf      WBC, UA 30-50 /hpf      Epithelial Cells Moderate /hpf      Bacteria, UA Innumerable /hpf      MUCUS THREADS Occasional    Urine culture [[id number]] Collected: 01/18/25 0002    Lab Status: In process Specimen: Urine, Straight Cath Updated: 01/18/25 0028    UA w Reflex to Microscopic w Reflex to Culture [[id number]]  (Abnormal) Collected: 01/18/25 0002    Lab Status: Final result Specimen: Urine, Straight Cath Updated: 01/18/25 0011     Color, UA Yellow     Clarity, UA Cloudy     Specific Gravity, UA 1.020     pH, UA 6.0     Leukocytes, UA 2+     Nitrite, UA Positive     Protein, UA Negative mg/dl      Glucose, UA Negative mg/dl      Ketones, UA Negative mg/dl      Urobilinogen, UA 0.2 E.U./dl      Bilirubin, UA Negative     Occult Blood, UA Trace-Intact    HS Troponin I 2hr [[id number]] Collected: 01/18/25 0004    Lab Status: In process Specimen: Blood from Arm, Left Updated: 01/18/25 0007    FLU/RSV/COVID - if FLU/RSV clinically relevant (2hr TAT) [[id number]]  (Normal) Collected: 01/17/25  1927    Lab Status: Final result Specimen: Nares from Nasopharyngeal Swab Updated: 01/17/25 2009     SARS-CoV-2 Negative     INFLUENZA A PCR Negative     INFLUENZA B PCR Negative     RSV PCR Negative    Narrative:      This test has been performed using the CoV-2/Flu/RSV plus assay on the [employer] platform. This test has been validated by the  and verified by the performing laboratory.     This test is designed to amplify and detect the following: nucleocapsid (N), envelope (E), and RNA-dependent RNA polymerase (RdRP) genes of the SARS-CoV-2 genome; matrix (M), basic polymerase (PB2), and acidic protein (PA) segments of the influenza A genome; matrix (M) and non-structural protein (NS) segments of the influenza B genome, and the nucleocapsid genes of RSV A and RSV B.     Positive results are indicative of the presence of Flu A, Flu B, RSV, and/or SARS-CoV-2 RNA. Positive results for SARS-CoV-2 or suspected novel influenza should be reported to state, local, or federal health departments according to local reporting requirements.      All results should be assessed in conjunction with clinical presentation and other laboratory markers for clinical management.     FOR PEDIATRIC PATIENTS - copy/paste COVID Guidelines URL to browser: [email or web address]       HS Troponin I 4hr [[id number]]     Lab Status: No result Specimen: Blood     TSH [[id number]]  (Abnormal) Collected: 01/17/25 1915    Lab Status: Final result Specimen: Blood from Arm, Right Updated: 01/1951     TSH 3RD GENERATON 5.778 uIU/mL     HS Troponin 0hr (reflex protocol) [[id number]]  (Normal) Collected: 01/17/25 1915    Lab Status: Final result Specimen: Blood from Arm, Right Updated: 01/17/25 1943     hs TnI 0hr 9 ng/L     Comprehensive metabolic panel [[id number]]  (Abnormal) Collected: 01/17/25 1915    Lab Status: Final result Specimen: Blood from Arm, Right Updated: 01/17/25  1939     Sodium 140 mmol/L      Potassium 3.9 mmol/L      Chloride 98 mmol/L      CO2 37 mmol/L      ANION GAP 5 mmol/L      BUN 28 mg/dL      Creatinine 1.32 mg/dL      Glucose 114 mg/dL      Calcium 8.8 mg/dL      Corrected Calcium 9.8 mg/dL      AST 9 U/L      ALT 6 U/L      Alkaline Phosphatase 74 U/L      Total Protein 7.3 g/dL      Albumin 2.8 g/dL      Total Bilirubin 0.51 mg/dL      eGFR 40 ml/min/1.73sq m     Narrative:      National Kidney Disease Foundation guidelines for Chronic Kidney Disease (CKD):     Stage 1 with normal or high GFR (GFR > 90 mL/min/1.73 square meters)    Stage 2 Mild CKD (GFR = 60-89 mL/min/1.73 square meters)    Stage 3A Moderate CKD (GFR = 45-59 mL/min/1.73 square meters)    Stage 3B Moderate CKD (GFR = 30-44 mL/min/1.73 square meters)    Stage 4 Severe CKD (GFR = 15-29 mL/min/1.73 square meters)    Stage 5 End Stage CKD (GFR <15 mL/min/1.73 square meters)  Note: GFR calculation is accurate only with a steady state creatinine    CBC and differential [[id number]]  (Abnormal) Collected: 01/17/25 1915    Lab Status: Final result Specimen: Blood from Arm, Right Updated: 01/17/25 1921     WBC 10.43 Thousand/uL      RBC 4.02 Million/uL      Hemoglobin 9.8 g/dL      Hematocrit 35.7 %      MCV 89 fL      MCH 24.4 pg      MCHC 27.5 g/dL      RDW 17.1 %      MPV 8.2 fL      Platelets 409 Thousands/uL      nRBC 0 /100 WBCs      Segmented % 82 %      Immature Grans % 0 %      Lymphocytes % 10 %      Monocytes % 4 %      Eosinophils Relative 3 %      Basophils Relative 1 %      Absolute Neutrophils 8.56 Thousands/µL      Absolute Immature Grans 0.04 Thousand/uL      Absolute Lymphocytes 1.03 Thousands/µL      Absolute Monocytes 0.44 Thousand/µL      Eosinophils Absolute 0.31 Thousand/µL      Basophils Absolute 0.05 Thousands/µL             XR chest 1 view    (Results Pending)       Procedures    ED Medication and Procedure Management   Prior to Admission Medications   Prescriptions Last Dose  Informant Patient Reported? Taking?   Alcohol Swabs 70 % PADS   No No   Sig: May substitute brand based on insurance coverage. Check glucose BID.   Blood Glucose Monitoring Suppl (OneTouch Verio Reflect) w/Device KIT   No No   Sig: May substitute brand based on insurance coverage. Check glucose BID.   OneTouch Delica Lancets 33G MISC   No No   Sig: May substitute brand based on insurance coverage. Check glucose BID.   acetaminophen (TYLENOL) 650 mg CR tablet  Self Yes No   Sig: Take 650 mg by mouth every 8 (eight) hours as needed (for osteoarthritis)   allopurinol (ZYLOPRIM) 100 mg tablet  Self No No   Sig: Take 1 tablet (100 mg total) by mouth daily   benzonatate (TESSALON PERLES) 100 mg capsule   No No   Sig: Take 1 capsule (100 mg total) by mouth every 8 (eight) hours   Patient not taking: Reported on 12/29/2024   furosemide (LASIX) 40 mg tablet   No No   Sig: Take 1.5 tablets (60 mg total) by mouth daily   Patient taking differently: Take 60 mg by mouth 2 (two) times a day   gabapentin (NEURONTIN) 100 mg capsule   No No   Sig: Take 1 capsule (100 mg total) by mouth 3 (three) times a day   Patient taking differently: Take 200 mg by mouth 3 (three) times a day   glucose blood (OneTouch Verio) test strip   No No   Sig: May substitute brand based on insurance coverage. Check glucose BID.   levothyroxine 125 mcg tablet  Self Yes No   Sig: Take 137 mcg by mouth daily   midodrine (PROAMATINE) 5 mg tablet   No No   Sig: Take 1 tablet (5 mg total) by mouth 3 (three) times a day before meals   neomycin-bacitracin-polymyxin (NEOSPORIN) 5-400-5,000 ointment   Yes No   Sig: Apply topically in the morning Topically to wounds   prednisoLONE acetate (PRED FORTE) 1 % ophthalmic suspension   Yes No   Sig: Administer 2 drops to the right eye 2 (two) times a day   tobramycin (TOBREX) 0.3 % SOLN   Yes No   Sig: Administer 1 drop to the right eye 4 times a day   triamcinolone (KENALOG) 0.5 % cream   Yes No   Sig: Apply 1 Application  topically as needed for rash or irritation   warfarin (Coumadin) 5 mg tablet   No No   Sig: Take 1 tablet (5 mg total) by mouth daily   Patient taking differently: Take 5 mg by mouth daily 2 mg M,T,W,F SAT, SUN  4 mg TH (12/29, 1/2, 1/5)      Facility-Administered Medications: None     Patient's Medications   Discharge Prescriptions    CEFUROXIME (CEFTIN) 500 MG TABLET    Take 1 tablet (500 mg total) by mouth every 12 (twelve) hours for 7 days       Start Date: 1/18/2025 End Date: 1/25/2025       Order Dose: 500 mg       Quantity: 14 tablet    Refills: 0     No discharge procedures on file.  ED SEPSIS DOCUMENTATION   Time reflects when diagnosis was documented in both MDM as applicable and the Disposition within this note       Time User Action Codes Description Comment    1/17/2025  9:43 PM Aleksey Mcgrath [R53.81] Malaise     1/18/2025 12:17 AM Aleksey Mcgrath [N39.0] UTI (urinary tract infection)                  Aleksey Mcgrath MD  01/19/25 8994

## 2025-01-19 RX ORDER — ACETAMINOPHEN 325 MG/1
650 TABLET ORAL ONCE
Status: COMPLETED | OUTPATIENT
Start: 2025-01-19 | End: 2025-01-19

## 2025-01-19 RX ADMIN — ACETAMINOPHEN 650 MG: 325 TABLET ORAL at 04:30

## 2025-01-19 NOTE — ED NOTES
Call placed to inform on-call care team that patient will be picked up at 8am.      Alexandre Stanton RN  01/19/25 7997

## 2025-01-19 NOTE — ED NOTES
"Delay in charting due to patient care.   Patient states to this RN that she feels not all of her concerns were addressed tonight. Dr. Mcgrath made aware.   When patient was discharge a round trip ride was placed for the patient by this RN. Rn was unaware that patient would not be able to go home due to not having a caregiver at the house. Ambulance made aware when they arrived for . Patient states to this Rn that she does not want to be \"black balled\" by the ambulance because of this. Patient also voiced concerns that she does not have oxygen at home and has been on oxygen due to positional hypoxia. RN to make Dr. Mcgrath aware of patient concerns.      Hugh Mattson RN  01/18/25 3735    "

## 2025-01-19 NOTE — ED NOTES
Patient sleeping, ready for discharge but refusing to be transported home due to hour of the day. Care otherwise complete. Will allow patient to sleep in ED and arrange transportation for the morning.     Alexandre Stanton RN  01/19/25 0155     O-Z Flap Text: The defect edges were debeveled with a #15 scalpel blade.  Given the location of the defect, shape of the defect and the proximity to free margins an O-Z flap was deemed most appropriate.  Using a sterile surgical marker, an appropriate transposition flap was drawn incorporating the defect and placing the expected incisions within the relaxed skin tension lines where possible. The area thus outlined was incised deep to adipose tissue with a #15 scalpel blade.  The skin margins were undermined to an appropriate distance in all directions utilizing iris scissors.

## 2025-01-19 NOTE — ED NOTES
Delay in charting due to patient care.   Patient states that this morning when patient was in emergency department for UTI, her discharge papers stated she received antibiotics and benadryl. Patient states she did not receive those medications.   This RN looked at medical administration documentation and showed patient that she did receive those medications.        Hugh Mattson RN  01/18/25 4595

## 2025-01-20 ENCOUNTER — RESULTS FOLLOW-UP (OUTPATIENT)
Dept: EMERGENCY DEPT | Facility: HOSPITAL | Age: 75
End: 2025-01-20

## 2025-01-20 NOTE — ED PROVIDER NOTES
Time reflects when diagnosis was documented in both MDM as applicable and the Disposition within this note       Time User Action Codes Description Comment    1/18/2025  9:13 PM MarquisafshanAleksey Add [N39.0] UTI (urinary tract infection)           ED Disposition       ED Disposition   Discharge    Condition   Stable    Date/Time   Sat Jan 18, 2025  9:13 PM    Comment   Zee Kirk discharge to home/self care.                   Assessment & Plan       Medical Decision Making  Patient is not having an anaphylactic reaction.  Patient given another dose here and monitored for anaphylaxis.  She continued to do well.  Patient's UTI is not significantly progressed.  Patient was concerned about her outpatient oxygen supplies, discussed that this would need to be addressed by her PCP. Discussed warning signs and symptoms with the patient as well as when to return to the emergency department versus follow up with [facility]. Patient states understanding and agreement with the plan.  Patient care delayed due to critical capacity in the emergency department.      This note was completed using dictation software.       Amount and/or Complexity of Data Reviewed  Independent Historian: EMS  External Data Reviewed: notes.    Risk  OTC drugs.  Prescription drug management.             Medications   ketorolac (TORADOL) injection 15 mg (15 mg Intramuscular Given 1/18/25 2113)   acetaminophen (TYLENOL) tablet 650 mg (650 mg Oral Given 1/19/25 0430)       ED Risk Strat Scores                          SBIRT 20yo+      Flowsheet Row Most Recent Value   Initial Alcohol Screen: US AUDIT-C     1. How often do you have a drink containing alcohol? 0 Filed at: 01/18/2025 2016   2. How many drinks containing alcohol do you have on a typical day you are drinking?  0 Filed at: 01/18/2025 2016   3a. Male UNDER 65: How often do you have five or more drinks on one occasion? 0 Filed at: 01/18/2025 2016   3b. FEMALE Any Age, or MALE 65+: How often do  you have 4 or more drinks on one occassion? 0 Filed at: 01/18/2025 2016   Audit-C Score 0 Filed at: 01/18/2025 2016   JYOTI: How many times in the past year have you...    Used an illegal drug or used a prescription medication for non-medical reasons? Never Filed at: 01/18/2025 2016                            History of Present Illness       Chief Complaint   Patient presents with    Neck Pain     Patient presents for neck pain for the past day. Seen here this morning and diagnosed with UTI. States she is allergic to the medication that was prescribed to her.        Past Medical History:   Diagnosis Date    Atrial fibrillation (HCC)     Bladder stone     C. difficile colitis     Cataract, nuclear sclerotic senile, right 12/05/2024    Cellulitis     CHF (congestive heart failure) (Formerly [facility])     Depression with anxiety     Disease of thyroid gland     Diverticulitis     Enterocolitis     History of abnormal cervical Pap smear     Kidney stone     Lymphedema     Menopause     Age 50    Morbid obesity with BMI of 70 and over, adult (Formerly [facility])     MRSA (methicillin resistant Staphylococcus aureus)     abdominal wound    Osteoarthritis     Phlebitis     left lower leg    Sleep apnea     Spondylolysis       Past Surgical History:   Procedure Laterality Date    APPENDECTOMY      CARPAL TUNNEL RELEASE      CATARACT EXTRACTION Right     CHOLECYSTECTOMY      CYSTOSCOPY      stent    EGD      FOOT SURGERY  1982    bone spur    KNEE SURGERY      AK ICAPSULAR CATARACT XTRJ INSJ IO LENS PRSTH 1 STG Right 12/05/2024    Procedure: CATARACT EXTRACTION WITH IOL IMPLANTATION;  Surgeon: Chantel Llanos MD;  Location: [facility];  Service: Ophthalmology    WISDOM TOOTH EXTRACTION        Family History   Problem Relation Age of Onset    Heart failure Mother     Heart disease Mother     Lymphoma Mother     Seizures Mother     Kidney disease Father     Heart disease Father     Heart failure Father     Diabetes type II Father     Diabetes type II Sister      Diabetes type II Brother     Uterine cancer Paternal Aunt     Breast cancer Neg Hx     Colon cancer Neg Hx     Ovarian cancer Neg Hx       Social History     Tobacco Use    Smoking status: Former     Current packs/day: 0.00     Average packs/day: 5.0 packs/day for 3.0 years (15.0 ttl pk-yrs)     Types: Cigarettes     Start date: 1967     Quit date: 1970     Years since quittin.5    Smokeless tobacco: Never    Tobacco comments:     5 packs/day until  (age 16-19)    Vaping Use    Vaping status: Never Used   Substance Use Topics    Alcohol use: Not Currently    Drug use: Not Currently     Types: Marijuana     Comment: As a teen - As per [employer]       E-Cigarette/Vaping    E-Cigarette Use Never User       E-Cigarette/Vaping Substances    Nicotine No     THC No     CBD No     Flavoring No     Other No     Unknown No       I have reviewed and agree with the history as documented.     74-year-old female presents for concerns that the antibiotic she was given will cause her anaphylaxis.  Patient was given a dose of the antibiotic for her UTI last night.  She did not have anaphylaxis.  She looked at the name of the antibiotic today when she was home and became concerned that it would cause severe harm.  Patient returns seeking alternative.  Patient is allergic to some cephalosporins, penicillins, Macrobid, fluoroquinolones, and Bactrim.  Her chart specifically lists multiple times that she was given later generation cephalosporins without difficulty.        Review of Systems   Constitutional:  Negative for fever.   HENT:  Negative for congestion.    Eyes:  Negative for visual disturbance.   Respiratory:  Negative for cough, chest tightness and shortness of breath.    Cardiovascular:  Negative for chest pain.   Gastrointestinal:  Negative for abdominal pain, diarrhea and vomiting.   Skin:  Negative for rash.   Neurological:  Negative for weakness and numbness.           Objective       ED Triage Vitals  [01/18/25 2016]   Temperature Pulse Blood Pressure Respirations SpO2 Patient Position - Orthostatic VS   97.6 °F (36.4 °C) 64 124/58 18 93 % --      Temp src Heart Rate Source BP Location FiO2 (%) Pain Score    -- Monitor -- -- 10 - Worst Possible Pain      Vitals      Date and Time Temp Pulse SpO2 Resp BP Pain Score FACES Pain Rating User   01/19/25 0430 -- -- -- -- -- 10 - Worst Possible Pain -- TG   01/18/25 2345 -- 62 93 % 16 112/62 -- -- RC   01/18/25 2113 -- -- -- -- -- 10 - Worst Possible Pain -- RC   01/18/25 2016 97.6 °F (36.4 °C) 64 93 % 18 124/58 10 - Worst Possible Pain -- RC            Physical Exam  Constitutional:       General: She is not in acute distress.     Appearance: She is well-developed. She is obese. She is not toxic-appearing or diaphoretic.   HENT:      Head: Normocephalic and atraumatic.      Right Ear: Tympanic membrane, ear canal and external ear normal.      Left Ear: Tympanic membrane, ear canal and external ear normal.      Nose: Nose normal.      Mouth/Throat:      Mouth: Mucous membranes are moist.      Pharynx: Oropharynx is clear.   Eyes:      Conjunctiva/sclera: Conjunctivae normal.      Pupils: Pupils are equal, round, and reactive to light.   Cardiovascular:      Rate and Rhythm: Normal rate and regular rhythm.      Heart sounds: Normal heart sounds.   Pulmonary:      Effort: Pulmonary effort is normal. No respiratory distress.      Breath sounds: Normal breath sounds.   Abdominal:      General: Bowel sounds are normal. There is no distension.      Palpations: Abdomen is soft.      Tenderness: There is no abdominal tenderness. There is no right CVA tenderness, left CVA tenderness, guarding or rebound.   Musculoskeletal:         General: Normal range of motion.      Cervical back: Normal range of motion and neck supple.   Skin:     General: Skin is warm and dry.      Capillary Refill: Capillary refill takes less than 2 seconds.   Neurological:      General: No focal deficit  present.      Mental Status: She is alert and oriented to person, place, and time.   Psychiatric:      Comments: Labile, tearful         Results Reviewed       None            No orders to display       Procedures    ED Medication and Procedure Management   Prior to Admission Medications   Prescriptions Last Dose Informant Patient Reported? Taking?   Alcohol Swabs 70 % PADS   No No   Sig: May substitute brand based on insurance coverage. Check glucose BID.   Blood Glucose Monitoring Suppl (OneTouch Verio Reflect) w/Device KIT   No No   Sig: May substitute brand based on insurance coverage. Check glucose BID.   OneTouch Delica Lancets 33G MISC   No No   Sig: May substitute brand based on insurance coverage. Check glucose BID.   acetaminophen (TYLENOL) 650 mg CR tablet  Self Yes No   Sig: Take 650 mg by mouth every 8 (eight) hours as needed (for osteoarthritis)   allopurinol (ZYLOPRIM) 100 mg tablet  Self No No   Sig: Take 1 tablet (100 mg total) by mouth daily   benzonatate (TESSALON PERLES) 100 mg capsule   No No   Sig: Take 1 capsule (100 mg total) by mouth every 8 (eight) hours   Patient not taking: Reported on 12/29/2024   cefuroxime (CEFTIN) 500 mg tablet   No No   Sig: Take 1 tablet (500 mg total) by mouth every 12 (twelve) hours for 7 days   furosemide (LASIX) 40 mg tablet   No No   Sig: Take 1.5 tablets (60 mg total) by mouth daily   Patient taking differently: Take 60 mg by mouth 2 (two) times a day   gabapentin (NEURONTIN) 100 mg capsule   No No   Sig: Take 1 capsule (100 mg total) by mouth 3 (three) times a day   Patient taking differently: Take 200 mg by mouth 3 (three) times a day   glucose blood (OneTouch Verio) test strip   No No   Sig: May substitute brand based on insurance coverage. Check glucose BID.   levothyroxine 125 mcg tablet  Self Yes No   Sig: Take 137 mcg by mouth daily   midodrine (PROAMATINE) 5 mg tablet   No No   Sig: Take 1 tablet (5 mg total) by mouth 3 (three) times a day before meals    neomycin-bacitracin-polymyxin (NEOSPORIN) 5-400-5,000 ointment   Yes No   Sig: Apply topically in the morning Topically to wounds   prednisoLONE acetate (PRED FORTE) 1 % ophthalmic suspension   Yes No   Sig: Administer 2 drops to the right eye 2 (two) times a day   tobramycin (TOBREX) 0.3 % SOLN   Yes No   Sig: Administer 1 drop to the right eye 4 times a day   triamcinolone (KENALOG) 0.5 % cream   Yes No   Sig: Apply 1 Application topically as needed for rash or irritation   warfarin (Coumadin) 5 mg tablet   No No   Sig: Take 1 tablet (5 mg total) by mouth daily   Patient taking differently: Take 5 mg by mouth daily 2 mg M,T,W,F SAT, SUN  4 mg TH (12/29, 1/2, 1/5)      Facility-Administered Medications: None     Discharge Medication List as of 1/18/2025  9:13 PM        CONTINUE these medications which have NOT CHANGED    Details   acetaminophen (TYLENOL) 650 mg CR tablet Take 650 mg by mouth every 8 (eight) hours as needed (for osteoarthritis), Historical Med      Alcohol Swabs 70 % PADS May substitute brand based on insurance coverage. Check glucose BID., Normal      allopurinol (ZYLOPRIM) 100 mg tablet Take 1 tablet (100 mg total) by mouth daily, Starting Mon 9/30/2019, Normal      benzonatate (TESSALON PERLES) 100 mg capsule Take 1 capsule (100 mg total) by mouth every 8 (eight) hours, Starting Sat 7/6/2024, Normal      Blood Glucose Monitoring Suppl (OneTouch Verio Reflect) w/Device KIT May substitute brand based on insurance coverage. Check glucose BID., Normal      cefuroxime (CEFTIN) 500 mg tablet Take 1 tablet (500 mg total) by mouth every 12 (twelve) hours for 7 days, Starting Sat 1/18/2025, Until Sat 1/25/2025, Normal      furosemide (LASIX) 40 mg tablet Take 1.5 tablets (60 mg total) by mouth daily, Starting Tue 6/4/2024, Until Sun 12/29/2024, No Print      gabapentin (NEURONTIN) 100 mg capsule Take 1 capsule (100 mg total) by mouth 3 (three) times a day, Starting Thu 8/10/2023, Until Sun 12/29/2024,  Normal      glucose blood (OneTouch Verio) test strip May substitute brand based on insurance coverage. Check glucose BID., Normal      levothyroxine 125 mcg tablet Take 137 mcg by mouth daily, Historical Med      midodrine (PROAMATINE) 5 mg tablet Take 1 tablet (5 mg total) by mouth 3 (three) times a day before meals, Starting Wed 1/1/2025, Normal      neomycin-bacitracin-polymyxin (NEOSPORIN) 5-400-5,000 ointment Apply topically in the morning Topically to wounds, Historical Med      OneTouch Delica Lancets 33G MISC May substitute brand based on insurance coverage. Check glucose BID., Normal      prednisoLONE acetate (PRED FORTE) 1 % ophthalmic suspension Administer 2 drops to the right eye 2 (two) times a day, Starting Mon 11/25/2024, Historical Med      tobramycin (TOBREX) 0.3 % SOLN Administer 1 drop to the right eye 4 times a day, Starting Mon 11/25/2024, Historical Med      triamcinolone (KENALOG) 0.5 % cream Apply 1 Application topically as needed for rash or irritation, Historical Med      warfarin (Coumadin) 5 mg tablet Take 1 tablet (5 mg total) by mouth daily, Starting Tue 5/28/2024, Until Sun 12/29/2024, Normal           No discharge procedures on file.  ED SEPSIS DOCUMENTATION   Time reflects when diagnosis was documented in both MDM as applicable and the Disposition within this note       Time User Action Codes Description Comment    1/18/2025  9:13 PM Aleksey Mcgrath Add [N39.0] UTI (urinary tract infection)                  Aleksey Mcgrath MD  01/20/25 5847

## 2025-01-21 LAB
BACTERIA UR CULT: ABNORMAL

## 2025-01-22 LAB
INR PPP: 3.3
PROTHROMBIN TIME: 32.6 SEC (ref 12–14.6)

## 2025-01-23 ENCOUNTER — ANTICOAG VISIT (OUTPATIENT)
Dept: CARDIOLOGY CLINIC | Facility: CLINIC | Age: 75
End: 2025-01-23
Payer: MEDICARE

## 2025-01-23 DIAGNOSIS — I48.11 LONGSTANDING PERSISTENT ATRIAL FIBRILLATION (HCC): Primary | ICD-10-CM

## 2025-01-23 DIAGNOSIS — Z79.01 LONG TERM (CURRENT) USE OF ANTICOAGULANTS: ICD-10-CM

## 2025-01-23 PROCEDURE — 93793 ANTICOAG MGMT PT WARFARIN: CPT | Performed by: PHYSICIAN ASSISTANT

## 2025-01-26 ENCOUNTER — HOSPITAL ENCOUNTER (INPATIENT)
Facility: HOSPITAL | Age: 75
LOS: 25 days | DRG: 606 | End: 2025-02-21
Attending: EMERGENCY MEDICINE | Admitting: INTERNAL MEDICINE
Payer: MEDICARE

## 2025-01-26 ENCOUNTER — APPOINTMENT (EMERGENCY)
Dept: CT IMAGING | Facility: HOSPITAL | Age: 75
DRG: 606 | End: 2025-01-26
Payer: MEDICARE

## 2025-01-26 DIAGNOSIS — N30.00 ACUTE CYSTITIS WITHOUT HEMATURIA: ICD-10-CM

## 2025-01-26 DIAGNOSIS — K57.90 DIVERTICULOSIS: ICD-10-CM

## 2025-01-26 DIAGNOSIS — I95.9 HYPOTENSION, UNSPECIFIED HYPOTENSION TYPE: ICD-10-CM

## 2025-01-26 DIAGNOSIS — L03.311 ABDOMINAL WALL CELLULITIS: Primary | ICD-10-CM

## 2025-01-26 DIAGNOSIS — E11.9 TYPE 2 DIABETES MELLITUS WITHOUT COMPLICATION, WITHOUT LONG-TERM CURRENT USE OF INSULIN (HCC): Chronic | ICD-10-CM

## 2025-01-26 DIAGNOSIS — I50.32 CHRONIC HEART FAILURE WITH PRESERVED EJECTION FRACTION (HCC): Chronic | ICD-10-CM

## 2025-01-26 DIAGNOSIS — Z51.5 COMFORT MEASURES ONLY STATUS: ICD-10-CM

## 2025-01-26 DIAGNOSIS — Z71.89 GOALS OF CARE, COUNSELING/DISCUSSION: ICD-10-CM

## 2025-01-26 DIAGNOSIS — D64.9 CHRONIC ANEMIA: ICD-10-CM

## 2025-01-26 DIAGNOSIS — R00.1 BRADYCARDIA: ICD-10-CM

## 2025-01-26 DIAGNOSIS — E66.9 OBESITY: ICD-10-CM

## 2025-01-26 DIAGNOSIS — M79.3 PANNICULITIS: ICD-10-CM

## 2025-01-26 DIAGNOSIS — I48.0 PAROXYSMAL A-FIB (HCC): ICD-10-CM

## 2025-01-26 DIAGNOSIS — N17.9 AKI (ACUTE KIDNEY INJURY) (HCC): ICD-10-CM

## 2025-01-26 LAB
ALBUMIN SERPL BCG-MCNC: 2.7 G/DL (ref 3.5–5)
ALP SERPL-CCNC: 78 U/L (ref 34–104)
ALT SERPL W P-5'-P-CCNC: 4 U/L (ref 7–52)
ANION GAP SERPL CALCULATED.3IONS-SCNC: 6 MMOL/L (ref 4–13)
APTT PPP: 49 SECONDS (ref 23–34)
AST SERPL W P-5'-P-CCNC: 12 U/L (ref 13–39)
BASOPHILS # BLD AUTO: 0.05 THOUSANDS/ÂΜL (ref 0–0.1)
BASOPHILS NFR BLD AUTO: 1 % (ref 0–1)
BILIRUB SERPL-MCNC: 0.79 MG/DL (ref 0.2–1)
BUN SERPL-MCNC: 24 MG/DL (ref 5–25)
CALCIUM ALBUM COR SERPL-MCNC: 9.8 MG/DL (ref 8.3–10.1)
CALCIUM SERPL-MCNC: 8.8 MG/DL (ref 8.4–10.2)
CHLORIDE SERPL-SCNC: 96 MMOL/L (ref 96–108)
CO2 SERPL-SCNC: 35 MMOL/L (ref 21–32)
CREAT SERPL-MCNC: 1.13 MG/DL (ref 0.6–1.3)
EOSINOPHIL # BLD AUTO: 0.41 THOUSAND/ÂΜL (ref 0–0.61)
EOSINOPHIL NFR BLD AUTO: 4 % (ref 0–6)
ERYTHROCYTE [DISTWIDTH] IN BLOOD BY AUTOMATED COUNT: 17.1 % (ref 11.6–15.1)
FLUAV RNA RESP QL NAA+PROBE: NEGATIVE
FLUBV RNA RESP QL NAA+PROBE: NEGATIVE
GFR SERPL CREATININE-BSD FRML MDRD: 48 ML/MIN/1.73SQ M
GLUCOSE SERPL-MCNC: 98 MG/DL (ref 65–140)
HCT VFR BLD AUTO: 35.9 % (ref 34.8–46.1)
HGB BLD-MCNC: 9.9 G/DL (ref 11.5–15.4)
IMM GRANULOCYTES # BLD AUTO: 0.03 THOUSAND/UL (ref 0–0.2)
IMM GRANULOCYTES NFR BLD AUTO: 0 % (ref 0–2)
INR PPP: 2.69 (ref 0.85–1.19)
LYMPHOCYTES # BLD AUTO: 1.13 THOUSANDS/ÂΜL (ref 0.6–4.47)
LYMPHOCYTES NFR BLD AUTO: 12 % (ref 14–44)
MAGNESIUM SERPL-MCNC: 2.1 MG/DL (ref 1.9–2.7)
MCH RBC QN AUTO: 24.1 PG (ref 26.8–34.3)
MCHC RBC AUTO-ENTMCNC: 27.6 G/DL (ref 31.4–37.4)
MCV RBC AUTO: 88 FL (ref 82–98)
MONOCYTES # BLD AUTO: 0.59 THOUSAND/ÂΜL (ref 0.17–1.22)
MONOCYTES NFR BLD AUTO: 6 % (ref 4–12)
NEUTROPHILS # BLD AUTO: 7.38 THOUSANDS/ÂΜL (ref 1.85–7.62)
NEUTS SEG NFR BLD AUTO: 77 % (ref 43–75)
NRBC BLD AUTO-RTO: 0 /100 WBCS
PLATELET # BLD AUTO: 398 THOUSANDS/UL (ref 149–390)
PMV BLD AUTO: 8.8 FL (ref 8.9–12.7)
POTASSIUM SERPL-SCNC: 4 MMOL/L (ref 3.5–5.3)
PROT SERPL-MCNC: 7.4 G/DL (ref 6.4–8.4)
PROTHROMBIN TIME: 28.8 SECONDS (ref 12.3–15)
RBC # BLD AUTO: 4.1 MILLION/UL (ref 3.81–5.12)
RSV RNA RESP QL NAA+PROBE: NEGATIVE
SARS-COV-2 RNA RESP QL NAA+PROBE: NEGATIVE
SODIUM SERPL-SCNC: 137 MMOL/L (ref 135–147)
WBC # BLD AUTO: 9.59 THOUSAND/UL (ref 4.31–10.16)

## 2025-01-26 PROCEDURE — 96375 TX/PRO/DX INJ NEW DRUG ADDON: CPT

## 2025-01-26 PROCEDURE — 84145 PROCALCITONIN (PCT): CPT | Performed by: PHYSICIAN ASSISTANT

## 2025-01-26 PROCEDURE — 85610 PROTHROMBIN TIME: CPT | Performed by: EMERGENCY MEDICINE

## 2025-01-26 PROCEDURE — 74177 CT ABD & PELVIS W/CONTRAST: CPT

## 2025-01-26 PROCEDURE — 96365 THER/PROPH/DIAG IV INF INIT: CPT

## 2025-01-26 PROCEDURE — 99285 EMERGENCY DEPT VISIT HI MDM: CPT | Performed by: EMERGENCY MEDICINE

## 2025-01-26 PROCEDURE — 96361 HYDRATE IV INFUSION ADD-ON: CPT

## 2025-01-26 PROCEDURE — 0241U HB NFCT DS VIR RESP RNA 4 TRGT: CPT | Performed by: EMERGENCY MEDICINE

## 2025-01-26 PROCEDURE — 80053 COMPREHEN METABOLIC PANEL: CPT | Performed by: EMERGENCY MEDICINE

## 2025-01-26 PROCEDURE — 83735 ASSAY OF MAGNESIUM: CPT | Performed by: EMERGENCY MEDICINE

## 2025-01-26 PROCEDURE — 85730 THROMBOPLASTIN TIME PARTIAL: CPT | Performed by: EMERGENCY MEDICINE

## 2025-01-26 PROCEDURE — 99284 EMERGENCY DEPT VISIT MOD MDM: CPT

## 2025-01-26 PROCEDURE — 36415 COLL VENOUS BLD VENIPUNCTURE: CPT | Performed by: EMERGENCY MEDICINE

## 2025-01-26 PROCEDURE — 85025 COMPLETE CBC W/AUTO DIFF WBC: CPT | Performed by: EMERGENCY MEDICINE

## 2025-01-26 RX ORDER — FENTANYL CITRATE 50 UG/ML
50 INJECTION, SOLUTION INTRAMUSCULAR; INTRAVENOUS ONCE
Refills: 0 | Status: COMPLETED | OUTPATIENT
Start: 2025-01-26 | End: 2025-01-26

## 2025-01-26 RX ADMIN — CEFEPIME 2000 MG: 2 INJECTION, POWDER, FOR SOLUTION INTRAVENOUS at 23:06

## 2025-01-26 RX ADMIN — IOHEXOL 100 ML: 350 INJECTION, SOLUTION INTRAVENOUS at 21:22

## 2025-01-26 RX ADMIN — FENTANYL CITRATE 50 MCG: 50 INJECTION INTRAMUSCULAR; INTRAVENOUS at 15:35

## 2025-01-26 RX ADMIN — SODIUM CHLORIDE 500 ML: 0.9 INJECTION, SOLUTION INTRAVENOUS at 14:23

## 2025-01-26 NOTE — Clinical Note
Case was discussed with mikala and the patient's admission status was agreed to be Admission Status: inpatient status to the service of Dr. Murdock .

## 2025-01-26 NOTE — ED PROVIDER NOTES
Time reflects when diagnosis was documented in both MDM as applicable and the Disposition within this note       Time User Action Codes Description Comment    1/26/2025 11:24 PM Check, Reddy Gonzalez [L03.311] Abdominal wall cellulitis     1/26/2025 11:24 PM Check, Reddy Gonzalez [E66.9] Obesity     1/26/2025 11:49 PM Check, Reddy Gonzalez [D64.9] Chronic anemia     1/26/2025 11:49 PM Check, Reddy Gonzalez [K57.90] Diverticulosis           ED Disposition       ED Disposition   Admit    Condition   Stable    Date/Time   Mon Jan 27, 2025 12:17 AM    Comment   Case was discussed with mikala and the patient's admission status was agreed to be Admission Status: obs status to the service of Dr. Murdock .               Assessment & Plan       Medical Decision Making  74F presents with abdominal pain, generalized, anterior. She also complains of some nausea, abdominal bloating, increased flatulance. Denies watery diarrhea.    Was seen in ED last week for possible food allergy, diagnosed with incidental UTI. Review indicates significant epithelial cell contamination from that UA. Did have + cultures. Was treated on ceftin then switched to macrobid by PCP. Denies dysuria.    Will get CBC to look for anemia or leukocytosis  Chemistry to look for LARRY, electrolyte abnormalities  Likely some mild GI effects of antibiotics. No significant diarrhea to indicate c. Diff (patient does have history)      Patient does have cellulitis however multiple allergies in different classes which provide some difficulty in determining which medication to use to treat her so at this time I believe vancomycin and cefepime would be her best bet however this was deferred until further imaging in case alternative antibiotics were referred.        Problems Addressed:  Abdominal wall cellulitis: acute illness or injury  Chronic anemia: chronic illness or injury  Diverticulosis: chronic illness or injury  Obesity: chronic illness or injury with exacerbation, progression, or  side effects of treatment    Amount and/or Complexity of Data Reviewed  Labs: ordered. Decision-making details documented in ED Course.  Radiology: ordered and independent interpretation performed. Decision-making details documented in ED Course.    Risk  Prescription drug management.  Decision regarding hospitalization.  Diagnosis or treatment significantly limited by social determinants of health.             Medications   insulin lispro (HumALOG/ADMELOG) 100 units/mL subcutaneous injection 5-25 Units ( Subcutaneous Not Given 1/27/25 1225)   insulin lispro (HumALOG/ADMELOG) 100 units/mL subcutaneous injection 2-12 Units ( Subcutaneous Not Given 1/27/25 0151)   furosemide (LASIX) injection 80 mg (80 mg Intravenous Not Given 1/27/25 1216)   acetaminophen (TYLENOL) tablet 650 mg (has no administration in time range)   ondansetron (ZOFRAN) injection 4 mg (has no administration in time range)   vancomycin (VANCOCIN) capsule 125 mg (125 mg Oral Given 1/27/25 1211)   cefepime (MAXIPIME) 2 g/50 mL dextrose IVPB (has no administration in time range)   sodium chloride 0.9 % bolus 500 mL (0 mL Intravenous Stopped 1/26/25 1529)   fentaNYL injection 50 mcg (50 mcg Intravenous Given 1/26/25 1535)   iohexol (OMNIPAQUE) 350 MG/ML injection (MULTI-DOSE) 100 mL (100 mL Intravenous Given 1/26/25 2122)   cefepime (MAXIPIME) 2 g/50 mL dextrose IVPB (0 mg Intravenous Stopped 1/27/25 0052)   vancomycin (VANCOCIN) 2,000 mg in sodium chloride 0.9 % 500 mL IVPB (0 mg Intravenous Stopped 1/27/25 0315)   fentaNYL injection 50 mcg (50 mcg Intravenous Given 1/27/25 0218)       ED Risk Strat Scores                                              History of Present Illness       Chief Complaint   Patient presents with    Diarrhea    Cellulitis     abdomen       Past Medical History:   Diagnosis Date    Atrial fibrillation (HCC)     Bladder stone     C. difficile colitis     Cataract, nuclear sclerotic senile, right 12/05/2024    Cellulitis     CHF  (congestive heart failure) (HCC)     Depression with anxiety     Disease of thyroid gland     Diverticulitis     Enterocolitis     History of abnormal cervical Pap smear     Kidney stone     Lymphedema     Menopause     Age 50    Morbid obesity with BMI of 70 and over, adult (HCC)     MRSA (methicillin resistant Staphylococcus aureus)     abdominal wound    Osteoarthritis     Phlebitis     left lower leg    Sleep apnea     Spondylolysis       Past Surgical History:   Procedure Laterality Date    APPENDECTOMY      CARPAL TUNNEL RELEASE      CATARACT EXTRACTION Right     CHOLECYSTECTOMY      CYSTOSCOPY      stent    EGD      FOOT SURGERY      bone spur    KNEE SURGERY      WI ICAPSULAR CATARACT XTRJ INSJ IO LENS PRSTH 1 STG Right 2024    Procedure: CATARACT EXTRACTION WITH IOL IMPLANTATION;  Surgeon: Chantel Llanos MD;  Location: [facility];  Service: Ophthalmology    WISDOM TOOTH EXTRACTION        Family History   Problem Relation Age of Onset    Heart failure Mother     Heart disease Mother     Lymphoma Mother     Seizures Mother     Kidney disease Father     Heart disease Father     Heart failure Father     Diabetes type II Father     Diabetes type II Sister     Diabetes type II Brother     Uterine cancer Paternal Aunt     Breast cancer Neg Hx     Colon cancer Neg Hx     Ovarian cancer Neg Hx       Social History     Tobacco Use    Smoking status: Former     Current packs/day: 0.00     Average packs/day: 5.0 packs/day for 3.0 years (15.0 ttl pk-yrs)     Types: Cigarettes     Start date: 1967     Quit date: 1970     Years since quittin.6    Smokeless tobacco: Never    Tobacco comments:     5 packs/day until  (age 17-19)    Vaping Use    Vaping status: Never Used   Substance Use Topics    Alcohol use: Not Currently    Drug use: Not Currently     Types: Marijuana     Comment: As a teen - As per [employer]       E-Cigarette/Vaping    E-Cigarette Use Never User       E-Cigarette/Vaping  Substances    Nicotine No     THC No     CBD No     Flavoring No     Other No     Unknown No       I have reviewed and agree with the history as documented.     74-year-old female presents with generalized malaise      Diarrhea      Review of Systems   Gastrointestinal:  Positive for diarrhea.           Objective       ED Triage Vitals [01/26/25 1348]   Temperature Pulse Blood Pressure Respirations SpO2 Patient Position - Orthostatic VS   98.3 °F (36.8 °C) 74 126/80 20 98 % Sitting      Temp Source Heart Rate Source BP Location FiO2 (%) Pain Score    Tympanic Monitor Left arm -- No Pain      Vitals      Date and Time Temp Pulse SpO2 Resp BP Pain Score FACES Pain Rating User   01/27/25 1130 -- 65 98 % -- 111/56 -- -- Colbert   01/27/25 0700 -- 63 100 % -- 111/54 -- -- Colbert   01/27/25 0500 -- 56 92 % -- 83/45 -- --    01/27/25 0400 -- 64 99 % -- 105/55 -- --    01/27/25 0352 -- 68 65 % -- -- -- --    01/27/25 0300 -- 67 97 % -- 88/47 -- --    01/27/25 0218 -- -- -- -- -- 7 --    01/27/25 0200 -- 58 98 % -- 108/54 -- --    01/27/25 0100 -- 55 99 % -- 125/56 -- --    01/27/25 0000 -- 63 98 % -- 113/56 -- --    01/26/25 2300 -- 52 99 % -- 109/52 -- --    01/26/25 2235 -- 57 100 % 20 110/57 6 --    01/26/25 1645 -- 57 97 % 20 103/52 -- --    01/26/25 1630 -- -- 87 % -- -- -- --    01/26/25 1535 -- -- -- -- -- 10 - Worst Possible Pain --    01/26/25 1500 -- 55 93 % 20 98/54 -- --    01/26/25 1400 -- 55 94 % 21 116/58 -- --    01/26/25 1348 98.3 °F (36.8 °C) 74 98 % 20 126/80 No Pain -- NAD            Physical Exam  Vitals and nursing note reviewed.   Constitutional:       Appearance: Normal appearance. She is well-developed. She is obese.   HENT:      Head: Normocephalic and atraumatic.   Eyes:      Conjunctiva/sclera: Conjunctivae normal.      Pupils: Pupils are equal, round, and reactive to light.   Neck:      Trachea: No tracheal deviation.   Cardiovascular:      Rate and Rhythm: Normal rate and  regular rhythm.      Heart sounds: Normal heart sounds. No murmur heard.  Pulmonary:      Effort: Pulmonary effort is normal. No respiratory distress.      Breath sounds: Normal breath sounds. No wheezing or rales.   Abdominal:      General: Bowel sounds are normal. There is no distension.      Palpations: Abdomen is soft.      Tenderness: There is no abdominal tenderness.   Musculoskeletal:         General: No deformity.      Cervical back: Normal range of motion and neck supple.      Right lower leg: Edema present.      Left lower leg: Edema present.   Skin:     General: Skin is warm and dry.      Capillary Refill: Capillary refill takes less than 2 seconds.      Comments: Significant cellulitis of the abdominal wall mostly on the right with serous drainage, some mild oozing bleeding from drying cracked skin, no evidence of abscess   Neurological:      General: No focal deficit present.      Mental Status: She is alert and oriented to person, place, and time.      Sensory: No sensory deficit.   Psychiatric:         Mood and Affect: Mood normal.         Judgment: Judgment normal.         Results Reviewed       Procedure Component Value Units Date/Time    Fingerstick Glucose (POCT) [[id number]]  (Normal) Collected: 01/27/25 1221    Lab Status: Final result Specimen: Blood Updated: 01/27/25 1225     POC Glucose 87 mg/dl     Fingerstick Glucose (POCT) [[id number]]  (Normal) Collected: 01/27/25 0754    Lab Status: Final result Specimen: Blood Updated: 01/27/25 0755     POC Glucose 84 mg/dl     Protime-INR [[id number]]  (Abnormal) Collected: 01/27/25 0544    Lab Status: Final result Specimen: Blood from Arm, Right Updated: 01/27/25 0617     Protime 28.8 seconds      INR 2.68    Narrative:      INR Therapeutic Range    Indication                                             INR Range      Atrial Fibrillation                                               2.0-3.0  Hypercoagulable State                                     2.0.2.3  Left Ventricular Asist Device                            2.0-3.0  Mechanical Heart Valve                                  -    Aortic(with afib, MI, embolism, HF, LA enlargement,    and/or coagulopathy)                                     2.0-3.0 (2.5-3.5)     Mitral                                                             2.5-3.5  Prosthetic/Bioprosthetic Heart Valve               2.0-3.0  Venous thromboembolism (VTE: VT, PE        2.0-3.0    Procalcitonin [[id number]]  (Normal) Collected: 01/27/25 0544    Lab Status: Final result Specimen: Blood from Arm, Right Updated: 01/27/25 0616     Procalcitonin 0.06 ng/ml     Basic metabolic panel [[id number]]  (Abnormal) Collected: 01/27/25 0544    Lab Status: Final result Specimen: Blood from Arm, Right Updated: 01/27/25 0607     Sodium 138 mmol/L      Potassium 3.9 mmol/L      Chloride 98 mmol/L      CO2 37 mmol/L      ANION GAP 3 mmol/L      BUN 23 mg/dL      Creatinine 1.06 mg/dL      Glucose 89 mg/dL      Glucose, Fasting 89 mg/dL      Calcium 8.0 mg/dL      eGFR 52 ml/min/1.73sq m     Narrative:      National Kidney Disease Foundation guidelines for Chronic Kidney Disease (CKD):     Stage 1 with normal or high GFR (GFR > 90 mL/min/1.73 square meters)    Stage 2 Mild CKD (GFR = 60-89 mL/min/1.73 square meters)    Stage 3A Moderate CKD (GFR = 45-59 mL/min/1.73 square meters)    Stage 3B Moderate CKD (GFR = 30-44 mL/min/1.73 square meters)    Stage 4 Severe CKD (GFR = 15-29 mL/min/1.73 square meters)    Stage 5 End Stage CKD (GFR <15 mL/min/1.73 square meters)  Note: GFR calculation is accurate only with a steady state creatinine    CBC (With Platelets) [[id number]]  (Abnormal) Collected: 01/27/25 0544    Lab Status: Final result Specimen: Blood from Arm, Right Updated: 01/27/25 0550     WBC 9.44 Thousand/uL      RBC 3.81 Million/uL      Hemoglobin 9.3 g/dL      Hematocrit 33.7 %      MCV 89 fL      MCH 24.4 pg      MCHC 27.6 g/dL      RDW 17.0 %      Platelets  382 Thousands/uL      MPV 8.5 fL     Fingerstick Glucose (POCT) [[id number]]  (Normal) Collected: 01/27/25 0149    Lab Status: Final result Specimen: Blood Updated: 01/27/25 0150     POC Glucose 96 mg/dl     Procalcitonin [[id number]]  (Normal) Collected: 01/26/25 1416    Lab Status: Final result Specimen: Blood from Arm, Right Updated: 01/27/25 0053     Procalcitonin <0.05 ng/ml     FLU/RSV/COVID - if FLU/RSV clinically relevant (2hr TAT) [[id number]]  (Normal) Collected: 01/26/25 1416    Lab Status: Final result Specimen: Nares from Nose Updated: 01/26/25 1504     SARS-CoV-2 Negative     INFLUENZA A PCR Negative     INFLUENZA B PCR Negative     RSV PCR Negative    Narrative:      This test has been performed using the CoV-2/Flu/RSV plus assay on the [employer] platform. This test has been validated by the  and verified by the performing laboratory.     This test is designed to amplify and detect the following: nucleocapsid (N), envelope (E), and RNA-dependent RNA polymerase (RdRP) genes of the SARS-CoV-2 genome; matrix (M), basic polymerase (PB2), and acidic protein (PA) segments of the influenza A genome; matrix (M) and non-structural protein (NS) segments of the influenza B genome, and the nucleocapsid genes of RSV A and RSV B.     Positive results are indicative of the presence of Flu A, Flu B, RSV, and/or SARS-CoV-2 RNA. Positive results for SARS-CoV-2 or suspected novel influenza should be reported to state, local, or federal health departments according to local reporting requirements.      All results should be assessed in conjunction with clinical presentation and other laboratory markers for clinical management.     FOR PEDIATRIC PATIENTS - copy/paste COVID Guidelines URL to browser: [email or web address]       Comprehensive metabolic panel [[id number]]  (Abnormal) Collected: 01/26/25 1416    Lab Status: Final result Specimen: Blood from  Arm, Right Updated: 01/26/25 1444     Sodium 137 mmol/L      Potassium 4.0 mmol/L      Chloride 96 mmol/L      CO2 35 mmol/L      ANION GAP 6 mmol/L      BUN 24 mg/dL      Creatinine 1.13 mg/dL      Glucose 98 mg/dL      Calcium 8.8 mg/dL      Corrected Calcium 9.8 mg/dL      AST 12 U/L      ALT 4 U/L      Alkaline Phosphatase 78 U/L      Total Protein 7.4 g/dL      Albumin 2.7 g/dL      Total Bilirubin 0.79 mg/dL      eGFR 48 ml/min/1.73sq m     Narrative:      National Kidney Disease Foundation guidelines for Chronic Kidney Disease (CKD):     Stage 1 with normal or high GFR (GFR > 90 mL/min/1.73 square meters)    Stage 2 Mild CKD (GFR = 60-89 mL/min/1.73 square meters)    Stage 3A Moderate CKD (GFR = 45-59 mL/min/1.73 square meters)    Stage 3B Moderate CKD (GFR = 30-44 mL/min/1.73 square meters)    Stage 4 Severe CKD (GFR = 15-29 mL/min/1.73 square meters)    Stage 5 End Stage CKD (GFR <15 mL/min/1.73 square meters)  Note: GFR calculation is accurate only with a steady state creatinine    Magnesium [[id number]]  (Normal) Collected: 01/26/25 1416    Lab Status: Final result Specimen: Blood from Arm, Right Updated: 01/26/25 1444     Magnesium 2.1 mg/dL     Protime-INR [[id number]]  (Abnormal) Collected: 01/26/25 1416    Lab Status: Final result Specimen: Blood from Arm, Right Updated: 01/26/25 1440     Protime 28.8 seconds      INR 2.69    Narrative:      INR Therapeutic Range    Indication                                             INR Range      Atrial Fibrillation                                               2.0-3.0  Hypercoagulable State                                    2.0.2.3  Left Ventricular Asist Device                            2.0-3.0  Mechanical Heart Valve                                  -    Aortic(with afib, MI, embolism, HF, LA enlargement,    and/or coagulopathy)                                     2.0-3.0 (2.5-3.5)     Mitral                                                              2.5-3.5  Prosthetic/Bioprosthetic Heart Valve               2.0-3.0  Venous thromboembolism (VTE: VT, PE        2.0-3.0    APTT [[id number]]  (Abnormal) Collected: 01/26/25 1416    Lab Status: Final result Specimen: Blood from Arm, Right Updated: 01/26/25 1440     PTT 49 seconds     CBC and differential [[id number]]  (Abnormal) Collected: 01/26/25 1416    Lab Status: Final result Specimen: Blood from Arm, Right Updated: 01/26/25 1427     WBC 9.59 Thousand/uL      RBC 4.10 Million/uL      Hemoglobin 9.9 g/dL      Hematocrit 35.9 %      MCV 88 fL      MCH 24.1 pg      MCHC 27.6 g/dL      RDW 17.1 %      MPV 8.8 fL      Platelets 398 Thousands/uL      nRBC 0 /100 WBCs      Segmented % 77 %      Immature Grans % 0 %      Lymphocytes % 12 %      Monocytes % 6 %      Eosinophils Relative 4 %      Basophils Relative 1 %      Absolute Neutrophils 7.38 Thousands/µL      Absolute Immature Grans 0.03 Thousand/uL      Absolute Lymphocytes 1.13 Thousands/µL      Absolute Monocytes 0.59 Thousand/µL      Eosinophils Absolute 0.41 Thousand/µL      Basophils Absolute 0.05 Thousands/µL             CT abdomen pelvis with contrast   Final Interpretation by Ant Patricio MD (01/26 2305)      1.  Right nephrolithiasis with 6 mm calculus in the right renal pelvis. No hydronephrosis.   2.  Diverticulosis without evidence of diverticulitis.         Workstation performed: [id number]             Procedures    ED Medication and Procedure Management   Prior to Admission Medications   Prescriptions Last Dose Informant Patient Reported? Taking?   Alcohol Swabs 70 % PADS   No No   Sig: May substitute brand based on insurance coverage. Check glucose BID.   Blood Glucose Monitoring Suppl (OneTouch Verio Reflect) w/Device KIT   No No   Sig: May substitute brand based on insurance coverage. Check glucose BID.   OneTouch Delica Lancets 33G MISC   No No   Sig: May substitute brand based on insurance coverage. Check glucose BID.   acetaminophen (TYLENOL)  650 mg CR tablet  Self Yes No   Sig: Take 650 mg by mouth every 8 (eight) hours as needed (for osteoarthritis)   allopurinol (ZYLOPRIM) 100 mg tablet  Self No No   Sig: Take 1 tablet (100 mg total) by mouth daily   benzonatate (TESSALON PERLES) 100 mg capsule   No No   Sig: Take 1 capsule (100 mg total) by mouth every 8 (eight) hours   Patient not taking: Reported on 12/29/2024   cefuroxime (CEFTIN) 500 mg tablet   No No   Sig: Take 1 tablet (500 mg total) by mouth every 12 (twelve) hours for 7 days   furosemide (LASIX) 40 mg tablet   No No   Sig: Take 1.5 tablets (60 mg total) by mouth daily   Patient taking differently: Take 60 mg by mouth 2 (two) times a day   gabapentin (NEURONTIN) 100 mg capsule   No No   Sig: Take 1 capsule (100 mg total) by mouth 3 (three) times a day   Patient taking differently: Take 200 mg by mouth 3 (three) times a day   glucose blood (OneTouch Verio) test strip   No No   Sig: May substitute brand based on insurance coverage. Check glucose BID.   levothyroxine 125 mcg tablet  Self Yes No   Sig: Take 137 mcg by mouth daily   midodrine (PROAMATINE) 5 mg tablet   No No   Sig: Take 1 tablet (5 mg total) by mouth 3 (three) times a day before meals   neomycin-bacitracin-polymyxin (NEOSPORIN) 5-400-5,000 ointment   Yes No   Sig: Apply topically in the morning Topically to wounds   prednisoLONE acetate (PRED FORTE) 1 % ophthalmic suspension   Yes No   Sig: Administer 2 drops to the right eye 2 (two) times a day   tobramycin (TOBREX) 0.3 % SOLN   Yes No   Sig: Administer 1 drop to the right eye 4 times a day   triamcinolone (KENALOG) 0.5 % cream   Yes No   Sig: Apply 1 Application topically as needed for rash or irritation   warfarin (Coumadin) 5 mg tablet   No No   Sig: Take 1 tablet (5 mg total) by mouth daily   Patient taking differently: Take 5 mg by mouth daily 2 mg M,T,W,F SAT, SUN  4 mg TH (12/29, 1/2, 1/5)      Facility-Administered Medications: None     Patient's Medications   Discharge  Prescriptions    No medications on file     No discharge procedures on file.  ED SEPSIS DOCUMENTATION   Time reflects when diagnosis was documented in both MDM as applicable and the Disposition within this note       Time User Action Codes Description Comment    1/26/2025 11:24 PM Check, Reddy Gonzalez [L03.311] Abdominal wall cellulitis     1/26/2025 11:24 PM Check, Reddy Gonzalez [E66.9] Obesity     1/26/2025 11:49 PM Check, Reddy Gonzalez [D64.9] Chronic anemia     1/26/2025 11:49 PM Check, Reddy Gonzalez [K57.90] Diverticulosis                  Desean Aguilar, DO  01/27/25 1317

## 2025-01-27 PROBLEM — I50.32 CHRONIC HEART FAILURE WITH PRESERVED EJECTION FRACTION (HCC): Chronic | Status: ACTIVE | Noted: 2024-11-06

## 2025-01-27 PROBLEM — I48.11 LONGSTANDING PERSISTENT ATRIAL FIBRILLATION (HCC): Chronic | Status: ACTIVE | Noted: 2022-02-18

## 2025-01-27 PROBLEM — E03.9 HYPOTHYROID: Chronic | Status: ACTIVE | Noted: 2020-10-03

## 2025-01-27 PROBLEM — R07.89 CHEST DISCOMFORT: Status: RESOLVED | Noted: 2024-01-18 | Resolved: 2025-01-27

## 2025-01-27 PROBLEM — J96.02 ACUTE RESPIRATORY FAILURE WITH HYPOXIA AND HYPERCAPNIA (HCC): Status: RESOLVED | Noted: 2024-05-23 | Resolved: 2025-01-27

## 2025-01-27 PROBLEM — E11.9 TYPE 2 DIABETES MELLITUS WITHOUT COMPLICATION, WITHOUT LONG-TERM CURRENT USE OF INSULIN (HCC): Chronic | Status: ACTIVE | Noted: 2024-11-06

## 2025-01-27 PROBLEM — I50.33 ACUTE ON CHRONIC DIASTOLIC CONGESTIVE HEART FAILURE (HCC): Status: RESOLVED | Noted: 2020-10-03 | Resolved: 2025-01-27

## 2025-01-27 PROBLEM — J96.01 ACUTE RESPIRATORY FAILURE WITH HYPOXIA AND HYPERCAPNIA (HCC): Status: RESOLVED | Noted: 2024-05-23 | Resolved: 2025-01-27

## 2025-01-27 LAB
ANION GAP SERPL CALCULATED.3IONS-SCNC: 3 MMOL/L (ref 4–13)
BUN SERPL-MCNC: 23 MG/DL (ref 5–25)
CALCIUM SERPL-MCNC: 8 MG/DL (ref 8.4–10.2)
CHLORIDE SERPL-SCNC: 98 MMOL/L (ref 96–108)
CO2 SERPL-SCNC: 37 MMOL/L (ref 21–32)
CREAT SERPL-MCNC: 1.06 MG/DL (ref 0.6–1.3)
ERYTHROCYTE [DISTWIDTH] IN BLOOD BY AUTOMATED COUNT: 17 % (ref 11.6–15.1)
GFR SERPL CREATININE-BSD FRML MDRD: 52 ML/MIN/1.73SQ M
GLUCOSE P FAST SERPL-MCNC: 89 MG/DL (ref 65–99)
GLUCOSE SERPL-MCNC: 107 MG/DL (ref 65–140)
GLUCOSE SERPL-MCNC: 123 MG/DL (ref 65–140)
GLUCOSE SERPL-MCNC: 246 MG/DL (ref 65–140)
GLUCOSE SERPL-MCNC: 84 MG/DL (ref 65–140)
GLUCOSE SERPL-MCNC: 87 MG/DL (ref 65–140)
GLUCOSE SERPL-MCNC: 89 MG/DL (ref 65–140)
GLUCOSE SERPL-MCNC: 96 MG/DL (ref 65–140)
HCT VFR BLD AUTO: 33.7 % (ref 34.8–46.1)
HGB BLD-MCNC: 9.3 G/DL (ref 11.5–15.4)
INR PPP: 2.68 (ref 0.85–1.19)
MCH RBC QN AUTO: 24.4 PG (ref 26.8–34.3)
MCHC RBC AUTO-ENTMCNC: 27.6 G/DL (ref 31.4–37.4)
MCV RBC AUTO: 89 FL (ref 82–98)
PLATELET # BLD AUTO: 382 THOUSANDS/UL (ref 149–390)
PMV BLD AUTO: 8.5 FL (ref 8.9–12.7)
POTASSIUM SERPL-SCNC: 3.9 MMOL/L (ref 3.5–5.3)
PROCALCITONIN SERPL-MCNC: 0.06 NG/ML
PROCALCITONIN SERPL-MCNC: <0.05 NG/ML
PROTHROMBIN TIME: 28.8 SECONDS (ref 12.3–15)
RBC # BLD AUTO: 3.81 MILLION/UL (ref 3.81–5.12)
SODIUM SERPL-SCNC: 138 MMOL/L (ref 135–147)
WBC # BLD AUTO: 9.44 THOUSAND/UL (ref 4.31–10.16)

## 2025-01-27 PROCEDURE — 84145 PROCALCITONIN (PCT): CPT | Performed by: PHYSICIAN ASSISTANT

## 2025-01-27 PROCEDURE — 85610 PROTHROMBIN TIME: CPT | Performed by: PHYSICIAN ASSISTANT

## 2025-01-27 PROCEDURE — 85027 COMPLETE CBC AUTOMATED: CPT | Performed by: PHYSICIAN ASSISTANT

## 2025-01-27 PROCEDURE — 82948 REAGENT STRIP/BLOOD GLUCOSE: CPT

## 2025-01-27 PROCEDURE — 80048 BASIC METABOLIC PNL TOTAL CA: CPT | Performed by: PHYSICIAN ASSISTANT

## 2025-01-27 PROCEDURE — 99223 1ST HOSP IP/OBS HIGH 75: CPT | Performed by: INTERNAL MEDICINE

## 2025-01-27 PROCEDURE — 36415 COLL VENOUS BLD VENIPUNCTURE: CPT | Performed by: PHYSICIAN ASSISTANT

## 2025-01-27 RX ORDER — GABAPENTIN 100 MG/1
200 CAPSULE ORAL 3 TIMES DAILY
Status: DISCONTINUED | OUTPATIENT
Start: 2025-01-27 | End: 2025-02-04

## 2025-01-27 RX ORDER — INSULIN LISPRO 100 [IU]/ML
2-12 INJECTION, SOLUTION INTRAVENOUS; SUBCUTANEOUS
Status: DISCONTINUED | OUTPATIENT
Start: 2025-01-27 | End: 2025-01-28

## 2025-01-27 RX ORDER — ONDANSETRON 2 MG/ML
4 INJECTION INTRAMUSCULAR; INTRAVENOUS EVERY 6 HOURS PRN
Status: DISCONTINUED | OUTPATIENT
Start: 2025-01-27 | End: 2025-02-06

## 2025-01-27 RX ORDER — FENTANYL CITRATE 50 UG/ML
50 INJECTION, SOLUTION INTRAMUSCULAR; INTRAVENOUS ONCE
Refills: 0 | Status: COMPLETED | OUTPATIENT
Start: 2025-01-27 | End: 2025-01-27

## 2025-01-27 RX ORDER — ALLOPURINOL 100 MG/1
100 TABLET ORAL DAILY
Status: CANCELLED | OUTPATIENT
Start: 2025-01-27

## 2025-01-27 RX ORDER — ACETAMINOPHEN 325 MG/1
650 TABLET ORAL EVERY 4 HOURS PRN
Status: DISCONTINUED | OUTPATIENT
Start: 2025-01-27 | End: 2025-02-06

## 2025-01-27 RX ORDER — VANCOMYCIN HYDROCHLORIDE 125 MG/1
125 CAPSULE ORAL EVERY 12 HOURS SCHEDULED
Status: DISCONTINUED | OUTPATIENT
Start: 2025-01-27 | End: 2025-02-04

## 2025-01-27 RX ORDER — FUROSEMIDE 10 MG/ML
80 INJECTION INTRAMUSCULAR; INTRAVENOUS
Status: DISCONTINUED | OUTPATIENT
Start: 2025-01-27 | End: 2025-01-30

## 2025-01-27 RX ORDER — OXYCODONE HYDROCHLORIDE 5 MG/1
5 TABLET ORAL EVERY 6 HOURS PRN
Refills: 0 | Status: DISCONTINUED | OUTPATIENT
Start: 2025-01-27 | End: 2025-02-01

## 2025-01-27 RX ORDER — CEFEPIME HYDROCHLORIDE 2 G/1
2000 INJECTION, POWDER, FOR SOLUTION INTRAVENOUS EVERY 12 HOURS
Status: DISCONTINUED | OUTPATIENT
Start: 2025-01-27 | End: 2025-01-27

## 2025-01-27 RX ORDER — INSULIN LISPRO 100 [IU]/ML
5-25 INJECTION, SOLUTION INTRAVENOUS; SUBCUTANEOUS
Status: DISCONTINUED | OUTPATIENT
Start: 2025-01-27 | End: 2025-01-28

## 2025-01-27 RX ORDER — WARFARIN SODIUM 2 MG/1
2 TABLET ORAL
Status: DISCONTINUED | OUTPATIENT
Start: 2025-01-27 | End: 2025-01-30

## 2025-01-27 RX ADMIN — CEFEPIME 2000 MG: 2 INJECTION, POWDER, FOR SOLUTION INTRAVENOUS at 13:42

## 2025-01-27 RX ADMIN — GABAPENTIN 200 MG: 100 CAPSULE ORAL at 23:48

## 2025-01-27 RX ADMIN — FENTANYL CITRATE 50 MCG: 50 INJECTION INTRAMUSCULAR; INTRAVENOUS at 02:18

## 2025-01-27 RX ADMIN — OXYCODONE 5 MG: 5 TABLET ORAL at 18:44

## 2025-01-27 RX ADMIN — WARFARIN SODIUM 2 MG: 2 TABLET ORAL at 19:38

## 2025-01-27 RX ADMIN — CEFEPIME 2000 MG: 2 INJECTION, POWDER, FOR SOLUTION INTRAVENOUS at 22:42

## 2025-01-27 RX ADMIN — VANCOMYCIN HYDROCHLORIDE 125 MG: 125 CAPSULE ORAL at 12:11

## 2025-01-27 RX ADMIN — VANCOMYCIN HYDROCHLORIDE 2000 MG: 1 INJECTION, POWDER, LYOPHILIZED, FOR SOLUTION INTRAVENOUS at 00:53

## 2025-01-27 RX ADMIN — FUROSEMIDE 80 MG: 10 INJECTION, SOLUTION INTRAMUSCULAR; INTRAVENOUS at 02:59

## 2025-01-27 RX ADMIN — VANCOMYCIN HYDROCHLORIDE 125 MG: 125 CAPSULE ORAL at 22:42

## 2025-01-27 NOTE — ED CARE HANDOFF
Emergency Department Sign Out Note        Sign out and transfer of care from Dr. Aguilar See Separate Emergency Department note.     The patient, Zee Kirk, was evaluated by the previous provider for abdominal pain.    Workup Completed:  CBC, CMP, magnesium, viral swab, coags, CT scan of the abdomen and pelvis    ED Course / Workup Pending (followup):  Labs demonstrate stable chronic anemia, otherwise unremarkable.  Patient signed out to me pending CT results.  Antibiotics ordered for abdominal wall cellulitis by previous provider.    CT scan shows right nephrolithiasis with 6 mm calculus in the right renal pelvis as well as diverticulosis without evidence of diverticulitis.    Patient will be admitted to the hospital for further treatment of recurrent abdominal wall cellulitis.                                      Procedures  Medical Decision Making  Amount and/or Complexity of Data Reviewed  Labs: ordered.  Radiology: ordered.    Risk  Prescription drug management.  Decision regarding hospitalization.            Disposition  Final diagnoses:   Abdominal wall cellulitis   Obesity   Chronic anemia   Diverticulosis     Time reflects when diagnosis was documented in both MDM as applicable and the Disposition within this note       Time User Action Codes Description Comment    1/26/2025 11:24 PM Check, Reddy Gonzalez [L03.311] Abdominal wall cellulitis     1/26/2025 11:24 PM Check, Reddy Add [E66.9] Obesity     1/26/2025 11:49 PM Check, Reddy Add [D64.9] Chronic anemia     1/26/2025 11:49 PM Check, Reddy Gonzalez [K57.90] Diverticulosis           ED Disposition       ED Disposition   Admit    Condition   Stable    Date/Time   Sun Jan 26, 2025 11:24 PM    Comment   Case was discussed with mikala and the patient's admission status was agreed to be Admission Status: inpatient status to the service of Dr. Murdock .               Follow-up Information    None       Patient's Medications   Discharge Prescriptions    No  medications on file     No discharge procedures on file.       ED Provider  Electronically Signed by     Reddy Bird MD  01/26/25 0732

## 2025-01-27 NOTE — ASSESSMENT & PLAN NOTE
"Lab Results   Component Value Date    HGBA1C 7.2 (H) 11/06/2024       No results for input(s): \"POCGLU\" in the last 72 hours.    Blood Sugar Average: Last 72 hrs:      "

## 2025-01-27 NOTE — ASSESSMENT & PLAN NOTE
"Lab Results   Component Value Date    HGBA1C 7.2 (H) 11/06/2024       No results for input(s): \"POCGLU\" in the last 72 hours.    Blood Sugar Average: Last 72 hrs:  Sliding scale insulin coverage with Accu-Chek  Diabetic diet  Target glucose 140-180  "

## 2025-01-27 NOTE — ED NOTES
: Pt cleaned with assistance of 4 other staff members to turn/hold/reposition. Lots of drainage from b/l pannus wounds (R>L) noted. Serosanguinous. Pt's personal supply of baby powder applied per request. Purewick applied. Pt voided 400 ml. Pt then given 80 mg iv lasix. Pt covered with warm blankets per request. Call bell & necessities within reach.     0352: Pt's sats dropped into 60's while asleep. Pt has hx of DAVID. Increased NC to 4L.

## 2025-01-27 NOTE — ASSESSMENT & PLAN NOTE
Wt Readings from Last 3 Encounters:   01/17/25 (!) 205 kg (451 lb)   01/01/25 (!) 205 kg (451 lb 14.4 oz)   12/16/24 (!) 203 kg (448 lb 8 oz)     IV Lasix  Monitor output/daily weight

## 2025-01-27 NOTE — H&P
"H&P - Hospitalist   Name: Zee Kirk 74 y.o. female I MRN: [id number]  Unit/Bed#: ED 23 I Date of Admission: 1/26/2025   Date of Service: 1/27/2025 I Hospital Day: 0     Assessment & Plan  Panniculitis  Patient with redness that started yesterday, right side with drainage. Caregiver packing area w/ gauze  IV abx  IV Lasix  Serial exam  Patient was not candidate for panniculectomy  History of Clostridioides difficile infection  Vancomycin proph  Hypothyroid  Continue levothyroxine  Class 3 severe obesity due to excess calories with serious comorbidity and body mass index (BMI) greater than or equal to 70 in adult ([facility])  BMI 79  Healthy diet and lifestyle modification recommended  Longstanding persistent atrial fibrillation (HCC)  Continue coumadin  Rate abundio 55-74  Chronic heart failure with preserved ejection fraction ([facility])  Wt Readings from Last 3 Encounters:   01/17/25 (!) 205 kg (451 lb)   01/01/25 (!) 205 kg (451 lb 14.4 oz)   12/16/24 (!) 203 kg (448 lb 8 oz)     IV Lasix  Monitor output/daily weight  Type 2 diabetes mellitus without complication, without long-term current use of insulin ([facility])  Lab Results   Component Value Date    HGBA1C 7.2 (H) 11/06/2024       No results for input(s): \"POCGLU\" in the last 72 hours.    Blood Sugar Average: Last 72 hrs:  Sliding scale insulin coverage with Accu-Chek  Diabetic diet  Target glucose 140-180      VTE Pharmacologic Prophylaxis: VTE Score: 8 High Risk (Score >/= 5) - Pharmacological DVT Prophylaxis Ordered: warfarin (Coumadin). Sequential Compression Devices Ordered.  Code Status: Level 1 - Full Code   Discussion with patient    Anticipated Length of Stay: Patient will be admitted on an observation basis with an anticipated length of stay of less than 2 midnights secondary to IV abx, serial exam, IV Lasix.    History of Present Illness   Chief Complaint: redness on abdomen with drainage    Zee Kirk is a 74 y.o. female with a PMH of chronic " A-fib, diabetes mellitus type 2 not on insulin, morbid obesity, hypothyroidism, chronic heart failure with preserved EF, history of C. difficile who presents with redness on her abdomen with drainage. who presents with abdominal pain. Patient recently treated for UTI, was given Ceftin because she was allergic. She was then placed on Macrobid and thought maybe that was what was causing her belly pain, felt she had to defecate all the time. Thought it flaired her IBS or diverticulosis. No acute finding on CT. She has redness on her sides of abdomen and wounds are open and draining like crazy. She had one episode of vomiting on Saturday night. She only ate toast and tea on Sunday. Has nausea.     Review of Systems   Constitutional:  Positive for appetite change, chills and fatigue. Negative for fever.   HENT:  Positive for trouble swallowing. Negative for rhinorrhea and sore throat.    Eyes:  Negative for discharge and redness.   Respiratory:  Positive for cough. Negative for shortness of breath.    Cardiovascular:  Positive for leg swelling. Negative for chest pain.   Gastrointestinal:  Positive for abdominal pain, diarrhea, nausea and vomiting.   Genitourinary:  Negative for dysuria and hematuria.   Musculoskeletal:  Positive for back pain and neck pain.   Skin:  Positive for color change and wound.   Neurological:  Positive for weakness. Negative for dizziness and headaches.   Psychiatric/Behavioral:  Negative for agitation and confusion.        Historical Information   Past Medical History:   Diagnosis Date    Atrial fibrillation (HCC)     Bladder stone     C. difficile colitis     Cataract, nuclear sclerotic senile, right 12/05/2024    Cellulitis     CHF (congestive heart failure) (HCC)     Depression with anxiety     Disease of thyroid gland     Diverticulitis     Enterocolitis     History of abnormal cervical Pap smear     Kidney stone     Lymphedema     Menopause     Age 50    Morbid obesity with BMI of 70 and  over, adult (HCC)     MRSA (methicillin resistant Staphylococcus aureus)     abdominal wound    Osteoarthritis     Phlebitis     left lower leg    Sleep apnea     Spondylolysis      Past Surgical History:   Procedure Laterality Date    APPENDECTOMY      CARPAL TUNNEL RELEASE      CATARACT EXTRACTION Right     CHOLECYSTECTOMY      CYSTOSCOPY      stent    EGD      FOOT SURGERY      bone spur    KNEE SURGERY      DC ICAPSULAR CATARACT XTRJ INSJ IO LENS PRSTH 1 STG Right 2024    Procedure: CATARACT EXTRACTION WITH IOL IMPLANTATION;  Surgeon: Chantel Llanos MD;  Location: [facility];  Service: Ophthalmology    WISDOM TOOTH EXTRACTION       Social History     Tobacco Use    Smoking status: Former     Current packs/day: 0.00     Average packs/day: 5.0 packs/day for 3.0 years (15.0 ttl pk-yrs)     Types: Cigarettes     Start date: 1967     Quit date: 1970     Years since quittin.6    Smokeless tobacco: Never    Tobacco comments:     5 packs/day until  (age [age])    Vaping Use    Vaping status: Never Used   Substance and Sexual Activity    Alcohol use: Not Currently    Drug use: Not Currently     Types: Marijuana     Comment: As a teen - As per [employer]     Sexual activity: Not Currently     E-Cigarette/Vaping    E-Cigarette Use Never User      E-Cigarette/Vaping Substances    Nicotine No     THC No     CBD No     Flavoring No     Other No     Unknown No      Family History   Problem Relation Age of Onset    Heart failure Mother     Heart disease Mother     Lymphoma Mother     Seizures Mother     Kidney disease Father     Heart disease Father     Heart failure Father     Diabetes type II Father     Diabetes type II Sister     Diabetes type II Brother     Uterine cancer Paternal Aunt     Breast cancer Neg Hx     Colon cancer Neg Hx     Ovarian cancer Neg Hx      Social History:  Marital Status: Single   Occupation: Retired  Patient Pre-hospital Living Situation: Home with caretaker  Patient  Pre-hospital Level of Mobility: non-ambulatory/bed bound  Patient Pre-hospital Diet Restrictions: None    Meds/Allergies   I have reviewed home medications with patient personally.  Prior to Admission medications    Medication Sig Start Date End Date Taking? Authorizing Provider   acetaminophen (TYLENOL) 650 mg CR tablet Take 650 mg by mouth every 8 (eight) hours as needed (for osteoarthritis)    Historical Provider, MD   Alcohol Swabs 70 % PADS May substitute brand based on insurance coverage. Check glucose BID. 11/8/24   Roxane Romero PA-C   allopurinol (ZYLOPRIM) 100 mg tablet Take 1 tablet (100 mg total) by mouth daily 9/30/19   Davon Lopez DO   benzonatate (TESSALON PERLES) 100 mg capsule Take 1 capsule (100 mg total) by mouth every 8 (eight) hours  Patient not taking: Reported on 12/29/2024 7/6/24   Elba Pena PA-C   Blood Glucose Monitoring Suppl (OneTouch Verio Reflect) w/Device KIT May substitute brand based on insurance coverage. Check glucose BID. 11/8/24   Roxane Romero PA-C   furosemide (LASIX) 40 mg tablet Take 1.5 tablets (60 mg total) by mouth daily  Patient taking differently: Take 60 mg by mouth 2 (two) times a day 6/4/24 12/29/24  Leticia Nagel MD   gabapentin (NEURONTIN) 100 mg capsule Take 1 capsule (100 mg total) by mouth 3 (three) times a day  Patient taking differently: Take 200 mg by mouth 3 (three) times a day 8/10/23 12/29/24  ORACIO Last   glucose blood (OneTouch Verio) test strip May substitute brand based on insurance coverage. Check glucose BID. 11/8/24   Roxane Romero PA-C   levothyroxine 125 mcg tablet Take 137 mcg by mouth daily    Historical Provider, MD   midodrine (PROAMATINE) 5 mg tablet Take 1 tablet (5 mg total) by mouth 3 (three) times a day before meals 1/1/25   ORACIO Last   neomycin-bacitracin-polymyxin (NEOSPORIN) 5-400-5,000 ointment Apply topically in the morning Topically to wounds    Historical Provider, MD   OneTouch  Delica Lancets 33G MISC May substitute brand based on insurance coverage. Check glucose BID. 11/8/24   Roxane Romero PA-C   prednisoLONE acetate (PRED FORTE) 1 % ophthalmic suspension Administer 2 drops to the right eye 2 (two) times a day 11/25/24   Historical Provider, MD   tobramycin (TOBREX) 0.3 % SOLN Administer 1 drop to the right eye 4 times a day 11/25/24   Historical Provider, MD   triamcinolone (KENALOG) 0.5 % cream Apply 1 Application topically as needed for rash or irritation    Historical Provider, MD   warfarin (Coumadin) 5 mg tablet Take 1 tablet (5 mg total) by mouth daily  Patient taking differently: Take 5 mg by mouth daily 2 mg M,T,W,F SAT, SUN  4 mg TH (12/29, 1/2, 1/5) 5/28/24 12/29/24  Amaris Cano, DO     Allergies   Allergen Reactions    Augmentin Es-600 [Amoxicillin-Pot Clavulanate] Anaphylaxis and Rash    Bactrim [Sulfamethoxazole-Trimethoprim] Anaphylaxis    Cefazolin Itching and Other (See Comments)     Patient developed itching and difficulty swallowing following IV cefazolin administration at [facility] 7/19/20 which required treatment with benadryl and epinephrine - has tolerated other cephalosporins with different side chains including cefepime, cefuroxime, and cephalexin    Ciprofloxacin Anaphylaxis    Doxycycline Anaphylaxis    Keflex [Cephalexin] Anaphylaxis    Penicillins Anaphylaxis, Rash and Other (See Comments)     Tolerates cefepime (11/18/21)    Other Rash, Irritability and Edema     Patient reports significant reaction to the use of Max Orb in the abdominal folds leading to swelling, excoriation, maceration and weeping of the skin.     Omeprazole GI Intolerance    Pork-Derived Products - Food Allergy Diarrhea    Sulfa Antibiotics Hives    Latex Rash and Edema    Vitamin C [Ascorbate - Food Allergy] Rash       Objective :  Temp:  [98.3 °F (36.8 °C)] 98.3 °F (36.8 °C)  HR:  [55-74] 57  BP: ()/(52-80) 110/57  Resp:  [20-21] 20  SpO2:  [87 %-100 %] 100 %  O2 Device:  Nasal cannula  Nasal Cannula O2 Flow Rate (L/min):  [2 L/min] 2 L/min    Physical Exam  Vitals reviewed.   Constitutional:       Appearance: Normal appearance. She is morbidly obese.      Interventions: Nasal cannula in place.   HENT:      Head: Normocephalic and atraumatic.      Nose: Nose normal.   Eyes:      General:         Right eye: No discharge.         Left eye: No discharge.      Extraocular Movements: Extraocular movements intact.      Conjunctiva/sclera: Conjunctivae normal.   Cardiovascular:      Rate and Rhythm: Normal rate and regular rhythm.   Pulmonary:      Effort: Pulmonary effort is normal. No respiratory distress.      Breath sounds: Normal breath sounds. No wheezing.   Abdominal:      General: Bowel sounds are normal. There is no distension.      Palpations: Abdomen is soft.      Tenderness: There is no abdominal tenderness. There is no guarding.   Musculoskeletal:         General: No swelling or tenderness. Normal range of motion.      Cervical back: Normal range of motion.      Right lower leg: No edema.      Left lower leg: No edema.   Skin:     General: Skin is warm and dry.      Capillary Refill: Capillary refill takes less than 2 seconds.      Coloration: Skin is pale.      Comments: Abdomen with redness, induration, bilateral sides with serous drainage   Neurological:      General: No focal deficit present.      Mental Status: She is alert and oriented to person, place, and time. Mental status is at baseline.      Motor: Weakness present.   Psychiatric:         Mood and Affect: Mood normal.         Behavior: Behavior normal.         Thought Content: Thought content normal.         Judgment: Judgment normal.        Lines/Drains:      Lab Results: I have reviewed the following results:  Results from last 7 days   Lab Units 01/26/25  1416   WBC Thousand/uL 9.59   HEMOGLOBIN g/dL 9.9*   HEMATOCRIT % 35.9   PLATELETS Thousands/uL 398*   SEGS PCT % 77*   LYMPHO PCT % 12*   MONO PCT % 6   EOS  PCT % 4     Results from last 7 days   Lab Units 01/26/25  1416   SODIUM mmol/L 137   POTASSIUM mmol/L 4.0   CHLORIDE mmol/L 96   CO2 mmol/L 35*   BUN mg/dL 24   CREATININE mg/dL 1.13   ANION GAP mmol/L 6   CALCIUM mg/dL 8.8   ALBUMIN g/dL 2.7*   TOTAL BILIRUBIN mg/dL 0.79   ALK PHOS U/L 78   ALT U/L 4*   AST U/L 12*   GLUCOSE RANDOM mg/dL 98     Results from last 7 days   Lab Units 01/26/25  1416   INR  2.69*         Lab Results   Component Value Date    HGBA1C 7.2 (H) 11/06/2024    HGBA1C 6.9 (H) 02/29/2024    HGBA1C 7.1 (H) 08/30/2021     Imaging Results Review: I reviewed radiology reports from this admission including: CT abdomen/pelvis.  Other Study Results Review: No additional pertinent studies reviewed.    Administrative Statements   I have spent a total time of 75 minutes in caring for this patient on the day of the visit/encounter including Diagnostic results, Counseling / Coordination of care, Documenting in the medical record, Reviewing / ordering tests, medicine, procedures  , and Obtaining or reviewing history  .    ** Please Note: This note has been constructed using a voice recognition system. **

## 2025-01-27 NOTE — ASSESSMENT & PLAN NOTE
Wt Readings from Last 3 Encounters:   01/17/25 (!) 205 kg (451 lb)   01/01/25 (!) 205 kg (451 lb 14.4 oz)   12/16/24 (!) 203 kg (448 lb 8 oz)

## 2025-01-27 NOTE — ASSESSMENT & PLAN NOTE
Patient with redness that started yesterday, right side with drainage. Caregiver packing area w/ gauze  IV abx  IV Lasix  Serial exam  Patient was not candidate for panniculectomy

## 2025-01-28 LAB
BASOPHILS # BLD AUTO: 0.03 THOUSANDS/ÂΜL (ref 0–0.1)
BASOPHILS NFR BLD AUTO: 0 % (ref 0–1)
EOSINOPHIL # BLD AUTO: 0.27 THOUSAND/ÂΜL (ref 0–0.61)
EOSINOPHIL NFR BLD AUTO: 3 % (ref 0–6)
ERYTHROCYTE [DISTWIDTH] IN BLOOD BY AUTOMATED COUNT: 17 % (ref 11.6–15.1)
GLUCOSE SERPL-MCNC: 88 MG/DL (ref 65–140)
HCT VFR BLD AUTO: 35.4 % (ref 34.8–46.1)
HGB BLD-MCNC: 9.7 G/DL (ref 11.5–15.4)
IMM GRANULOCYTES # BLD AUTO: 0.04 THOUSAND/UL (ref 0–0.2)
IMM GRANULOCYTES NFR BLD AUTO: 1 % (ref 0–2)
INR PPP: 2.53 (ref 0.85–1.19)
LYMPHOCYTES # BLD AUTO: 0.65 THOUSANDS/ÂΜL (ref 0.6–4.47)
LYMPHOCYTES NFR BLD AUTO: 8 % (ref 14–44)
MCH RBC QN AUTO: 24.3 PG (ref 26.8–34.3)
MCHC RBC AUTO-ENTMCNC: 27.4 G/DL (ref 31.4–37.4)
MCV RBC AUTO: 89 FL (ref 82–98)
MONOCYTES # BLD AUTO: 0.44 THOUSAND/ÂΜL (ref 0.17–1.22)
MONOCYTES NFR BLD AUTO: 5 % (ref 4–12)
NEUTROPHILS # BLD AUTO: 6.86 THOUSANDS/ÂΜL (ref 1.85–7.62)
NEUTS SEG NFR BLD AUTO: 83 % (ref 43–75)
NRBC BLD AUTO-RTO: 0 /100 WBCS
PLATELET # BLD AUTO: 391 THOUSANDS/UL (ref 149–390)
PMV BLD AUTO: 8.6 FL (ref 8.9–12.7)
PROTHROMBIN TIME: 27.6 SECONDS (ref 12.3–15)
RBC # BLD AUTO: 3.99 MILLION/UL (ref 3.81–5.12)
WBC # BLD AUTO: 8.29 THOUSAND/UL (ref 4.31–10.16)

## 2025-01-28 PROCEDURE — 99232 SBSQ HOSP IP/OBS MODERATE 35: CPT | Performed by: INTERNAL MEDICINE

## 2025-01-28 PROCEDURE — 82948 REAGENT STRIP/BLOOD GLUCOSE: CPT

## 2025-01-28 PROCEDURE — 85610 PROTHROMBIN TIME: CPT | Performed by: INTERNAL MEDICINE

## 2025-01-28 PROCEDURE — 85025 COMPLETE CBC W/AUTO DIFF WBC: CPT | Performed by: INTERNAL MEDICINE

## 2025-01-28 RX ORDER — MIDODRINE HYDROCHLORIDE 5 MG/1
5 TABLET ORAL
Status: DISCONTINUED | OUTPATIENT
Start: 2025-01-28 | End: 2025-01-29

## 2025-01-28 RX ADMIN — GABAPENTIN 200 MG: 100 CAPSULE ORAL at 16:44

## 2025-01-28 RX ADMIN — OXYCODONE 5 MG: 5 TABLET ORAL at 16:45

## 2025-01-28 RX ADMIN — GABAPENTIN 200 MG: 100 CAPSULE ORAL at 22:20

## 2025-01-28 RX ADMIN — WARFARIN SODIUM 2 MG: 2 TABLET ORAL at 18:04

## 2025-01-28 RX ADMIN — MIDODRINE HYDROCHLORIDE 5 MG: 5 TABLET ORAL at 09:10

## 2025-01-28 RX ADMIN — GABAPENTIN 200 MG: 100 CAPSULE ORAL at 08:16

## 2025-01-28 RX ADMIN — MIDODRINE HYDROCHLORIDE 5 MG: 5 TABLET ORAL at 12:41

## 2025-01-28 RX ADMIN — CEFEPIME 2000 MG: 2 INJECTION, POWDER, FOR SOLUTION INTRAVENOUS at 22:20

## 2025-01-28 RX ADMIN — MIDODRINE HYDROCHLORIDE 5 MG: 5 TABLET ORAL at 16:44

## 2025-01-28 RX ADMIN — VANCOMYCIN HYDROCHLORIDE 125 MG: 125 CAPSULE ORAL at 08:16

## 2025-01-28 RX ADMIN — CEFEPIME 2000 MG: 2 INJECTION, POWDER, FOR SOLUTION INTRAVENOUS at 09:45

## 2025-01-28 RX ADMIN — VANCOMYCIN HYDROCHLORIDE 125 MG: 125 CAPSULE ORAL at 22:20

## 2025-01-28 NOTE — NUTRITION
01/28/25 1633   Biochemical Data,Medical Tests, and Procedures   Biochemical Data/Medical Tests/Procedures Lab values reviewed;Meds reviewed   Labs (Comment) 1/28/2025 glucose 88, 1/27/2025 calcium 8.0   Meds (Comment) Maxipime, Lasix, vancomycin, warfarin, oxycodone   Nutrition-Focused Physical Exam   Nutrition-Focused Physical Exam Findings RN skin assessment reviewed;Edema;Wound  (MASD at left thigh, MASD at right thigh, wound at left axilla, wound at right axilla, wound at medial upper back, wound at left lower abdomen, wound at left hip, +1 generalized edema, +1 right lower extremity edema)   Nutrition-Focused Physical Exam Findings Patient with obesity   Medical-Related Concerns panniculitis, hypothyroid, obesity, heart failure, type 2 diabetes, GERD, gout, anemia   Adequacy of Intake   Nutrition Modality PO   Feeding Route   PO Independent   Current PO Intake   Current Diet Order CCD 2, 2 g sodium, 1500 mL fluid restricted diet   Current Meal Intake %   Estimated calorie intake compared to estimated need Nutrient needs are met per patient report   PES Statement   Problem Clinical   Weight (3) Overweight/obesity NC-3.3   Overweight/ obesity specific to Obese, Class III (5)   Related to Energy intake>energy output over time   As evidenced by: BMI   Recommendations/Interventions   Malnutrition/BMI Present Yes   Provider already documenting morbid obesity Yes   Adult BMI Classifications Morbid Obesity > 70   Summary Wound care RN consulted; wound; high BMI.  Presents with diarrhea and abdominal cellulitis.  Past medical history significant for panniculitis, hypothyroid, obesity, heart failure, type 2 diabetes, GERD, gout, anemia.  Weight history reviewed.  Weight appears to fluctuate likely in setting of fluid status.  +1 generalized edema, +1 right lower extremity edema.  Skin integrity as above.  Prescribed a CCD 2, 2 g sodium, 1500 mL fluid restricted diet.  Patient reports tolerating diet as  "prescribed.  She reports consuming 100% at lunchtime meal.  Reports no change to nutrition history previously obtained by this RD. \"Usually has 2 meals daily.  Breakfast-cereal or 2 waffles or toast and tea.  2-3 PM meal-mashed potatoes, meat, limited vegetables or hamburger with fries.  States she avoids snacking after dinner.  Has been avoiding eggs for health.  Denies dysphagia.  Her home aide cooks and grocery shops.  Consumes unsalted crackers with pills.  States her weight fluctuates with edema and lymphedema.\"  RD discussed diet as prescribed.  RD provided \"carbohydrate counting for people with diabetes\" handout to give patient ideas on carb controlled meal planning.  She offers no additional nutrition questions.  RD to follow as needed.   Interventions/Recommendations Continue current diet order   Education Assessment   Education Education initiated/ completed   Education Notes RD discussed diet as prescribed. RD provided \"carbohydrate counting for people with diabetes\" handout to give patient ideas on carb controlled meal planning. She offers no additional nutrition questions.   Patient Nutrition Goals   Goal Comprehend education;Improve quality of diet;Avoid weight gain   Goal Status Initiated   Timeframe to complete goal by next f/u   Nutrition Complexity Risk   Nutrition complexity level Low risk   Follow up date 02/04/25       "

## 2025-01-28 NOTE — ASSESSMENT & PLAN NOTE
Wt Readings from Last 3 Encounters:   01/27/25 (!) 205 kg (452 lb 9.7 oz)   01/17/25 (!) 205 kg (451 lb)   01/01/25 (!) 205 kg (451 lb 14.4 oz)     IV Lasix  Monitor output/daily weight  No signs of acute exacerbation

## 2025-01-28 NOTE — NURSING NOTE
Patient stating she is not diabetic and therefore does not need finger sticks for glucose check before meals. Dr. Whatley made aware

## 2025-01-28 NOTE — ASSESSMENT & PLAN NOTE
Lab Results   Component Value Date    HGBA1C 7.2 (H) 11/06/2024       Recent Labs     01/27/25  1756 01/27/25  1758 01/27/25  2130 01/28/25  0743   POCGLU 246* 123 107 88       Blood Sugar Average: Last 72 hrs:  (P) [id number]  Patient denies any history of diabetes.  Monitor glucose levels on BMP as needed

## 2025-01-28 NOTE — ASSESSMENT & PLAN NOTE
Patient with redness that started just prior to admission, right side with drainage. Caregiver packing area w/ gauze  Images reviewed in the chart  Will continue IV cefepime  IV Lasix  CT imaging with no obvious abscess  Patient was not candidate for panniculectomy in the past  Continue supportive care  Wound care evaluation  If patient does have further drainage/open area, can consider culturing

## 2025-01-28 NOTE — PLAN OF CARE
Problem: Prexisting or High Potential for Compromised Skin Integrity  Goal: Skin integrity is maintained or improved  Description: INTERVENTIONS:  - Identify patients at risk for skin breakdown  - Assess and monitor skin integrity  - Assess and monitor nutrition and hydration status  - Monitor labs   - Assess for incontinence   - Turn and reposition patient  - Assist with mobility/ambulation  - Relieve pressure over bony prominences  - Avoid friction and shearing  - Provide appropriate hygiene as needed including keeping skin clean and dry  - Evaluate need for skin moisturizer/barrier cream  - Collaborate with interdisciplinary team   - Patient/family teaching  - Consider wound care consult   Outcome: Progressing     Problem: PAIN - ADULT  Goal: Verbalizes/displays adequate comfort level or baseline comfort level  Description: Interventions:  - Encourage patient to monitor pain and request assistance  - Assess pain using appropriate pain scale  - Administer analgesics based on type and severity of pain and evaluate response  - Implement non-pharmacological measures as appropriate and evaluate response  - Consider cultural and social influences on pain and pain management  - Notify physician/advanced practitioner if interventions unsuccessful or patient reports new pain  Outcome: Progressing     Problem: INFECTION - ADULT  Goal: Absence or prevention of progression during hospitalization  Description: INTERVENTIONS:  - Assess and monitor for signs and symptoms of infection  - Monitor lab/diagnostic results  - Monitor all insertion sites, i.e. indwelling lines, tubes, and drains  - Monitor endotracheal if appropriate and nasal secretions for changes in amount and color  - [place] appropriate cooling/warming therapies per order  - Administer medications as ordered  - Instruct and encourage patient and family to use good hand hygiene technique  - Identify and instruct in appropriate isolation precautions for  identified infection/condition  Outcome: Progressing  Goal: Absence of fever/infection during neutropenic period  Description: INTERVENTIONS:  - Monitor WBC    Outcome: Progressing     Problem: SAFETY ADULT  Goal: Patient will remain free of falls  Description: INTERVENTIONS:  - Educate patient/family on patient safety including physical limitations  - Instruct patient to call for assistance with activity   - Consult OT/PT to assist with strengthening/mobility   - Keep Call bell within reach  - Keep bed low and locked with side rails adjusted as appropriate  - Keep care items and personal belongings within reach  - Initiate and maintain comfort rounds  - Make Fall Risk Sign visible to staff  - Offer Toileting every 1 Hours, in advance of need  - Initiate/Maintain bed alarm  - Obtain necessary fall risk management equipment: bed alarm  - Apply yellow socks and bracelet for high fall risk patients  - Consider moving patient to room near nurses station  Outcome: Progressing  Goal: Maintain or return to baseline ADL function  Description: INTERVENTIONS:  -  Assess patient's ability to carry out ADLs; assess patient's baseline for ADL function and identify physical deficits which impact ability to perform ADLs (bathing, care of mouth/teeth, toileting, grooming, dressing, etc.)  - Assess/evaluate cause of self-care deficits   - Assess range of motion  - Assess patient's mobility; develop plan if impaired  - Assess patient's need for assistive devices and provide as appropriate  - Encourage maximum independence but intervene and supervise when necessary  - Involve family in performance of ADLs  - Assess for home care needs following discharge   - Consider OT consult to assist with ADL evaluation and planning for discharge  - Provide patient education as appropriate  Outcome: Progressing  Goal: Maintains/Returns to pre admission functional level  Description: INTERVENTIONS:  - Perform AM-PAC 6 Click Basic Mobility/ Daily  Activity assessment daily.  - Set and communicate daily mobility goal to care team and patient/family/caregiver.   - Collaborate with rehabilitation services on mobility goals if consulted  - Perform Range of Motion 3 times a day.  - Reposition patient every 2 hours.  - Dangle patient 3 times a day  - Stand patient 3 times a day  - Ambulate patient 3 times a day  - Out of bed to chair 3 times a day   - Out of bed for meals 3 times a day  - Out of bed for toileting  - Record patient progress and toleration of activity level   Outcome: Progressing     Problem: DISCHARGE PLANNING  Goal: Discharge to home or other facility with appropriate resources  Description: INTERVENTIONS:  - Identify barriers to discharge w/patient and caregiver  - Arrange for needed discharge resources and transportation as appropriate  - Identify discharge learning needs (meds, wound care, etc.)  - Arrange for interpretive services to assist at discharge as needed  - Refer to Case Management Department for coordinating discharge planning if the patient needs post-hospital services based on physician/advanced practitioner order or complex needs related to functional status, cognitive ability, or social support system  Outcome: Progressing     Problem: Knowledge Deficit  Goal: Patient/family/caregiver demonstrates understanding of disease process, treatment plan, medications, and discharge instructions  Description: Complete learning assessment and assess knowledge base.  Interventions:  - Provide teaching at level of understanding  - Provide teaching via preferred learning methods  Outcome: Progressing     Problem: GASTROINTESTINAL - ADULT  Goal: Minimal or absence of nausea and/or vomiting  Description: INTERVENTIONS:  - Administer IV fluids if ordered to ensure adequate hydration  - Maintain NPO status until nausea and vomiting are resolved  - Nasogastric tube if ordered  - Administer ordered antiemetic medications as needed  - Provide  nonpharmacologic comfort measures as appropriate  - Advance diet as tolerated, if ordered  - Consider nutrition services referral to assist patient with adequate nutrition and appropriate food choices  Outcome: Progressing  Goal: Maintains or returns to baseline bowel function  Description: INTERVENTIONS:  - Assess bowel function  - Encourage oral fluids to ensure adequate hydration  - Administer IV fluids if ordered to ensure adequate hydration  - Administer ordered medications as needed  - Encourage mobilization and activity  - Consider nutritional services referral to assist patient with adequate nutrition and appropriate food choices  Outcome: Progressing  Goal: Maintains adequate nutritional intake  Description: INTERVENTIONS:  - Monitor percentage of each meal consumed  - Identify factors contributing to decreased intake, treat as appropriate  - Assist with meals as needed  - Monitor I&O, weight, and lab values if indicated  - Obtain nutrition services referral as needed  Outcome: Progressing  Goal: Establish and maintain optimal ostomy function  Description: INTERVENTIONS:  - Assess bowel function  - Encourage oral fluids to ensure adequate hydration  - Administer IV fluids if ordered to ensure adequate hydration   - Administer ordered medications as needed  - Encourage mobilization and activity  - Nutrition services referral to assist patient with appropriate food choices  - Assess stoma site  - Consider wound care consult   Outcome: Progressing  Goal: Oral mucous membranes remain intact  Description: INTERVENTIONS  - Assess oral mucosa and hygiene practices  - Implement preventative oral hygiene regimen  - Implement oral medicated treatments as ordered  - Initiate Nutrition services referral as needed  Outcome: Progressing

## 2025-01-28 NOTE — CASE MANAGEMENT
Case Management Assessment & Discharge Planning Note    Patient name Zee Kirk  Location /-01 MRN [id number]  : 1950 Date 2025       Current Admission Date: 2025  Current Admission Diagnosis:Panniculitis   Patient Active Problem List    Diagnosis Date Noted Date Diagnosed    Bilateral lower extremity edema 2024     Hypotension 2024     Weakness 2024     Paroxysmal A-fib ([facility]) 2024     History of kidney stones 2024     Skin abnormalities 2024     Cataract, nuclear sclerotic senile, right 2024     Chronic heart failure with preserved ejection fraction ([facility]) 2024     Hypoxic episode 2024     Type 2 diabetes mellitus without complication, without long-term current use of insulin ([facility]) 2024     Long term (current) use of anticoagulants 2024     Subtherapeutic international normalized ratio (INR) 2024     Multiple open wounds 2024     Left leg pain 2023     Shingles 2023     Cellulitis- Ruled Out 2023     Longstanding persistent atrial fibrillation (HCC) 2022     History of Clostridioides difficile infection 2022     Hypothyroid 10/03/2020     Mild episode of recurrent major depressive disorder ([facility]) 10/03/2020     Idiopathic chronic 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 10/03/2020     Class 3 severe obesity due to excess calories with serious comorbidity and body mass index (BMI) greater than or equal to 70 in adult ([facility]) 10/03/2020     Panniculitis 10/03/2020     Gastroesophageal reflux disease without esophagitis 10/03/2020     Hx MRSA infection 2020     Impaired mobility 2019     Osteoarthritis of glenohumeral joint 2019     Left ovarian cyst 2017     Depression with anxiety 07/15/2017     Anemia 2016     Adjustment disorder 2013     Irritable bowel syndrome 2012     Left atrial enlargement 2012     Left ventricular  hypertrophy 12/04/2012     Carpal tunnel syndrome 05/30/2012     Gout 05/30/2012     Hyperlipidemia 05/30/2012     Symptomatic menopausal or female climacteric states 05/30/2012       LOS (days): 1  Geometric Mean LOS (GMLOS) (days): 3.1  Days to GMLOS:2.2     OBJECTIVE:  PATIENT READMITTED TO HOSPITAL  Risk of Unplanned Readmission Score: 34.18         Current admission status: Inpatient       Preferred Pharmacy:   [facility] [place] - [address]  [place] [address]  Phone: [phone] Fax: [phone]    Primary Care Provider: Franklyn Caruso MD    Primary Insurance: MEDICARE  Secondary Insurance: AAR    ASSESSMENT:  Active Health Care Proxies       Rbobie Richmond Alternate Health Care Representative - Nephew   Primary Phone: [phone] (Mobile)                 Advance Directives  Does patient have a Health Care POA?: Yes  Does patient have Advance Directives?: Yes  Advance Directives: Power of  for health care  Primary Contact: Robbie Richmond/Nephew ([phone])    Readmission Root Cause  30 Day Readmission: Yes  During your hospital stay, did someone (provider, nurse, ) explain your care to you in a way you could understand?: Yes  Did you feel medically stable to leave the hospital?: Yes  Were you able to pay for your medication at the pharmacy?: Yes  During previous admission, was a post-acute recommendation made?: No  Patient was readmitted due to: Abdominal pain, panniculitis  Action Plan: IV ABX, wound care    Patient Information  Admitted from:: Home  Mental Status: Alert  During Assessment patient was accompanied by: Not accompanied during assessment  Assessment information provided by:: Patient  Primary Caregiver: Private caregiver  Caregiver's Name:: Bailee caregivers 8:00 AM - 8:00 PM  Caregiver's Relationship to Patient:: Other (Specify)  Support Systems: Family members  County of Residence: [facility] city do you live  in?: [place]  Home entry access options. Select all that apply.: Ramp  Type of Current Residence: 2 story home  Upon entering residence, is there a bedroom on the main floor (no further steps)?: Yes  Upon entering residence, is there a bathroom on the main floor (no further steps)?: Yes  Living Arrangements: Lives Alone    Activities of Daily Living Prior to Admission  Functional Status: Total dependent  Completes ADLs independently?: No  Level of ADL dependence: Total Dependent  Ambulates independently?: No  Level of ambulatory dependence: Total Dependent  Does patient use assisted devices?: Yes  Assisted Devices (DME) used: Hospital Bed  Does patient currently own DME?: Yes  What DME does the patient currently own?: Walker, Wheelchair, Hospital Bed, Bedside Commode  Does the patient have a history of Short-Term Rehab?: Yes ([facility], [place], [facility])  Does patient have a history of HHC?: Yes ([facility] [place])  Does patient currently have HHC?: No    Patient Information Continued  Income Source: SSI/SSD  Does patient have prescription coverage?: Yes  Does patient have a history of substance abuse?: No  Does patient have a history of Mental Health Diagnosis?: No    DISCHARGE DETAILS:         Were Treatment Team discharge recommendations reviewed with patient/caregiver?: Yes  Did patient/caregiver verbalize understanding of patient care needs?: Yes  Were patient/caregiver advised of the risks associated with not following Treatment Team discharge recommendations?: Yes    Contacts  Patient Contacts: Patient  Contact Method: In Person  Reason/Outcome: Continuity of Care, Emergency Contact, Discharge Planning    Other Referral/Resources/Interventions Provided:  Interventions: None Indicated    Treatment Team Recommendation: Home  Discharge Destination Plan:: Home  Transport at Discharge : BLS Ambulance     Additional Comments: Patient reports residing alone in a 2 story house that has a ramp to enter and a  chair lift to 2nd floor. Pt reports being primarily bed bound, has a hospital bed, rolling walker, wheelchair, and bedside commode. She reports that her outpatient provider is working to get her qualified for home 02. Pt reports that she has waiver caregivers [facility] from 8:00 AM - 8:00 PM every day and is alone overnight. Pt reports that her plan is to return home with caregivers at time of discharge. CM department to remain available for additional discharge concerns or needs.

## 2025-01-28 NOTE — ASSESSMENT & PLAN NOTE
Currently rate controlled.  Coumadin for anticoagulation   Denies PEARL/No. PEARL screening performed.  STOP BANG Legend: 0-2 = LOW Risk; 3-4 = INTERMEDIATE Risk; 5-8 = HIGH Risk

## 2025-01-28 NOTE — PROGRESS NOTES
Progress Note - Hospitalist   Name: Zee Kirk 74 y.o. female I MRN: [id number]  Unit/Bed#: -01 I Date of Admission: 1/26/2025   Date of Service: 1/28/2025 I Hospital Day: 1    Assessment & Plan  Panniculitis  Patient with redness that started just prior to admission, right side with drainage. Caregiver packing area w/ gauze  Images reviewed in the chart  Will continue IV cefepime  IV Lasix  CT imaging with no obvious abscess  Patient was not candidate for panniculectomy in the past  Continue supportive care  Wound care evaluation  If patient does have further drainage/open area, can consider culturing  History of Clostridioides difficile infection  Will continue oral vancomycin while on antibiotics  Hypothyroid  Continue levothyroxine  Class 3 severe obesity due to excess calories with serious comorbidity and body mass index (BMI) greater than or equal to 70 in adult ([facility])  BMI 79  Healthy diet and lifestyle modification recommended  Longstanding persistent atrial fibrillation (HCC)  Currently rate controlled.  Coumadin for anticoagulation  Chronic heart failure with preserved ejection fraction ([facility])  Wt Readings from Last 3 Encounters:   01/27/25 (!) 205 kg (452 lb 9.7 oz)   01/17/25 (!) 205 kg (451 lb)   01/01/25 (!) 205 kg (451 lb 14.4 oz)     IV Lasix  Monitor output/daily weight  No signs of acute exacerbation  Type 2 diabetes mellitus without complication, without long-term current use of insulin ([facility])  Lab Results   Component Value Date    HGBA1C 7.2 (H) 11/06/2024       Recent Labs     01/27/25  1756 01/27/25  1758 01/27/25  2130 01/28/25  0743   POCGLU 246* 123 107 88       Blood Sugar Average: Last 72 hrs:  (P) 118.5192675867129648  Patient denies any history of diabetes.  Monitor glucose levels on BMP as needed    VTE Pharmacologic Prophylaxis: VTE Score: 8 Moderate Risk (Score 3-4) - Pharmacological DVT Prophylaxis Ordered: warfarin (Coumadin).    Mobility:   Basic Mobility Inpatient  Raw Score: 7  JH-HLM Goal: 2: Bed activities/Dependent transfer  JH-HLM Achieved: 2: Bed activities/Dependent transfer  JH-HLM Goal achieved. Continue to encourage appropriate mobility.    Patient Centered Rounds: I performed bedside rounds with nursing staff today.   Discussions with Specialists or Other Care Team Provider: yes    Education and Discussions with Family / Patient:  Updated patient regarding plan of care.     Current Length of Stay: 1 day(s)  Current Patient Status: Inpatient   Certification Statement: The patient will continue to require additional inpatient hospital stay due to panniculitis  Discharge Plan: Anticipate discharge in 24-48 hrs to home with home services.    Code Status: Level 1 - Full Code    Subjective   No overnight events noted    Objective :  Temp:  [98.4 °F (36.9 °C)-98.8 °F (37.1 °C)] 98.4 °F (36.9 °C)  HR:  [61-68] 68  BP: ()/(48-61) 92/50  Resp:  [18-20] 18  SpO2:  [95 %-100 %] 95 %  O2 Device: Nasal cannula  Nasal Cannula O2 Flow Rate (L/min):  [2 L/min] 2 L/min    Body mass index is 80.18 kg/m².     Input and Output Summary (last 24 hours):     Intake/Output Summary (Last 24 hours) at 1/28/2025 1208  Last data filed at 1/28/2025 0300  Gross per 24 hour   Intake --   Output 1200 ml   Net -1200 ml       Physical Exam  Constitutional:       General: She is not in acute distress.     Comments: Morbidly obese   HENT:      Head: Normocephalic and atraumatic.      Nose: Nose normal.      Mouth/Throat:      Mouth: Mucous membranes are moist.   Eyes:      Extraocular Movements: Extraocular movements intact.      Conjunctiva/sclera: Conjunctivae normal.   Cardiovascular:      Rate and Rhythm: Normal rate and regular rhythm.   Pulmonary:      Effort: Pulmonary effort is normal. No respiratory distress.   Abdominal:      Palpations: Abdomen is soft.      Tenderness: There is abdominal tenderness.   Musculoskeletal:         General: Normal range of motion.      Cervical back: Normal  range of motion and neck supple.      Comments: Generalized weakness   Skin:     General: Skin is warm and dry.      Findings: Erythema present.   Neurological:      General: No focal deficit present.      Mental Status: She is alert. Mental status is at baseline.      Cranial Nerves: No cranial nerve deficit.   Psychiatric:         Mood and Affect: Mood normal.         Behavior: Behavior normal.           Lines/Drains:  Lines/Drains/Airways       Active Status       Name Placement date Placement time Site Days    External Urinary Catheter 01/27/25  0255  -- 1                            Lab Results: I have reviewed the following results:   Results from last 7 days   Lab Units 01/28/25  0438   WBC Thousand/uL 8.29   HEMOGLOBIN g/dL 9.7*   HEMATOCRIT % 35.4   PLATELETS Thousands/uL 391*   SEGS PCT % 83*   LYMPHO PCT % 8*   MONO PCT % 5   EOS PCT % 3     Results from last 7 days   Lab Units 01/27/25  0544 01/26/25  1416   SODIUM mmol/L 138 137   POTASSIUM mmol/L 3.9 4.0   CHLORIDE mmol/L 98 96   CO2 mmol/L 37* 35*   BUN mg/dL 23 24   CREATININE mg/dL 1.06 1.13   ANION GAP mmol/L 3* 6   CALCIUM mg/dL 8.0* 8.8   ALBUMIN g/dL  --  2.7*   TOTAL BILIRUBIN mg/dL  --  0.79   ALK PHOS U/L  --  78   ALT U/L  --  4*   AST U/L  --  12*   GLUCOSE RANDOM mg/dL 89 98     Results from last 7 days   Lab Units 01/28/25  0438   INR  2.53*     Results from last 7 days   Lab Units 01/28/25  0743 01/27/25  2130 01/27/25  1758 01/27/25  1756 01/27/25  1221 01/27/25  0754 01/27/25  0149   POC GLUCOSE mg/dl 88 107 123 246* 87 84 96         Results from last 7 days   Lab Units 01/27/25  0544 01/26/25  1416   PROCALCITONIN ng/ml 0.06 <0.05       Recent Cultures (last 7 days):         Imaging Results Review: No pertinent imaging studies reviewed.  Other Study Results Review: No additional pertinent studies reviewed.    Last 24 Hours Medication List:     Current Facility-Administered Medications:     acetaminophen (TYLENOL) tablet 650 mg, Q4H  PRN    cefepime (MAXIPIME) 2 g/50 mL dextrose IVPB, Q12H TEODORO, Last Rate: 2,000 mg (01/28/25 0945)    furosemide (LASIX) injection 80 mg, BID (diuretic)    gabapentin (NEURONTIN) capsule 200 mg, TID    midodrine (PROAMATINE) tablet 5 mg, TID AC    morphine injection 2 mg, Q6H PRN    ondansetron (ZOFRAN) injection 4 mg, Q6H PRN    oxyCODONE (ROXICODONE) IR tablet 5 mg, Q6H PRN    vancomycin (VANCOCIN) capsule 125 mg, Q12H TEODORO    warfarin (COUMADIN) tablet 2 mg, Daily (warfarin)    Administrative Statements   Today, Patient Was Seen By: Yane Verdugo MD      **Please Note: This note may have been constructed using a voice recognition system.**

## 2025-01-29 LAB
ANION GAP SERPL CALCULATED.3IONS-SCNC: 6 MMOL/L (ref 4–13)
BUN SERPL-MCNC: 23 MG/DL (ref 5–25)
CALCIUM SERPL-MCNC: 7.4 MG/DL (ref 8.4–10.2)
CHLORIDE SERPL-SCNC: 98 MMOL/L (ref 96–108)
CO2 SERPL-SCNC: 34 MMOL/L (ref 21–32)
CREAT SERPL-MCNC: 1.2 MG/DL (ref 0.6–1.3)
ERYTHROCYTE [DISTWIDTH] IN BLOOD BY AUTOMATED COUNT: 17.2 % (ref 11.6–15.1)
GFR SERPL CREATININE-BSD FRML MDRD: 44 ML/MIN/1.73SQ M
GLUCOSE SERPL-MCNC: 97 MG/DL (ref 65–140)
HCT VFR BLD AUTO: 34.2 % (ref 34.8–46.1)
HGB BLD-MCNC: 9.4 G/DL (ref 11.5–15.4)
INR PPP: 2.11 (ref 0.85–1.19)
MCH RBC QN AUTO: 24.5 PG (ref 26.8–34.3)
MCHC RBC AUTO-ENTMCNC: 27.5 G/DL (ref 31.4–37.4)
MCV RBC AUTO: 89 FL (ref 82–98)
PLATELET # BLD AUTO: 381 THOUSANDS/UL (ref 149–390)
PMV BLD AUTO: 8.7 FL (ref 8.9–12.7)
POTASSIUM SERPL-SCNC: 4.3 MMOL/L (ref 3.5–5.3)
PROTHROMBIN TIME: 24 SECONDS (ref 12.3–15)
RBC # BLD AUTO: 3.84 MILLION/UL (ref 3.81–5.12)
SODIUM SERPL-SCNC: 138 MMOL/L (ref 135–147)
WBC # BLD AUTO: 8.74 THOUSAND/UL (ref 4.31–10.16)

## 2025-01-29 PROCEDURE — 99233 SBSQ HOSP IP/OBS HIGH 50: CPT

## 2025-01-29 PROCEDURE — 85027 COMPLETE CBC AUTOMATED: CPT | Performed by: INTERNAL MEDICINE

## 2025-01-29 PROCEDURE — 85610 PROTHROMBIN TIME: CPT | Performed by: INTERNAL MEDICINE

## 2025-01-29 PROCEDURE — 80048 BASIC METABOLIC PNL TOTAL CA: CPT | Performed by: INTERNAL MEDICINE

## 2025-01-29 RX ORDER — MIDODRINE HYDROCHLORIDE 5 MG/1
10 TABLET ORAL
Status: DISCONTINUED | OUTPATIENT
Start: 2025-01-29 | End: 2025-02-06

## 2025-01-29 RX ORDER — ALBUMIN (HUMAN) 12.5 G/50ML
25 SOLUTION INTRAVENOUS ONCE
Status: COMPLETED | OUTPATIENT
Start: 2025-01-29 | End: 2025-01-29

## 2025-01-29 RX ORDER — MICONAZOLE NITRATE 20 MG/G
CREAM TOPICAL 2 TIMES DAILY
Status: DISCONTINUED | OUTPATIENT
Start: 2025-01-29 | End: 2025-02-06

## 2025-01-29 RX ORDER — ALLOPURINOL 100 MG/1
100 TABLET ORAL DAILY
Status: DISCONTINUED | OUTPATIENT
Start: 2025-01-29 | End: 2025-02-06

## 2025-01-29 RX ADMIN — MICONAZOLE NITRATE: 20 CREAM TOPICAL at 18:14

## 2025-01-29 RX ADMIN — GABAPENTIN 200 MG: 100 CAPSULE ORAL at 08:01

## 2025-01-29 RX ADMIN — MIDODRINE HYDROCHLORIDE 10 MG: 5 TABLET ORAL at 16:44

## 2025-01-29 RX ADMIN — ALBUMIN (HUMAN) 25 G: 0.25 INJECTION, SOLUTION INTRAVENOUS at 08:54

## 2025-01-29 RX ADMIN — LEVOTHYROXINE SODIUM 137 MCG: 25 TABLET ORAL at 08:00

## 2025-01-29 RX ADMIN — ONDANSETRON 4 MG: 2 INJECTION INTRAMUSCULAR; INTRAVENOUS at 05:45

## 2025-01-29 RX ADMIN — ALLOPURINOL 100 MG: 100 TABLET ORAL at 08:01

## 2025-01-29 RX ADMIN — MIDODRINE HYDROCHLORIDE 10 MG: 5 TABLET ORAL at 11:08

## 2025-01-29 RX ADMIN — WARFARIN SODIUM 2 MG: 2 TABLET ORAL at 18:46

## 2025-01-29 RX ADMIN — CEFEPIME 2000 MG: 2 INJECTION, POWDER, FOR SOLUTION INTRAVENOUS at 10:29

## 2025-01-29 RX ADMIN — FUROSEMIDE 80 MG: 10 INJECTION, SOLUTION INTRAMUSCULAR; INTRAVENOUS at 16:46

## 2025-01-29 RX ADMIN — GABAPENTIN 200 MG: 100 CAPSULE ORAL at 16:44

## 2025-01-29 RX ADMIN — MIDODRINE HYDROCHLORIDE 5 MG: 5 TABLET ORAL at 08:00

## 2025-01-29 RX ADMIN — FUROSEMIDE 80 MG: 10 INJECTION, SOLUTION INTRAMUSCULAR; INTRAVENOUS at 13:45

## 2025-01-29 RX ADMIN — VANCOMYCIN HYDROCHLORIDE 125 MG: 125 CAPSULE ORAL at 08:01

## 2025-01-29 NOTE — ASSESSMENT & PLAN NOTE
Wt Readings from Last 3 Encounters:   01/29/25 (!) 205 kg (452 lb 13.2 oz)   01/17/25 (!) 205 kg (451 lb)   01/01/25 (!) 205 kg (451 lb 14.4 oz)     Patient reported she was not taking her Lasix more than once a day at home due to low blood pressures  States her weight is currently at least 20 lbs above her baseline weight  Bilateral lower extremity edema pitting to the thighs  Continue IV Lasix  Discussed with outpatient nephrologist will increase midodrine from 5 mg to 10 mg 3 times a day to help support blood pressure  Patient also received dose of albumin x 1  Continue to monitor fluid status closely  Monitor output/daily weight  Intake and output monitoring

## 2025-01-29 NOTE — WOUND OSTOMY CARE
Consult Note - Wound   Zee Kirk 74 y.o. female MRN: [id number]  Unit/Bed#: -01 Encounter: [id number]        Assessment :   Patient admitted to [facility] due to panniculitis. History of a-fib., CHF, lymphedema, sleep apnea. Wound care nurse consulted for abdomina wounds. Patient is agreeable to assessment, alert and oriented x4, incontinent of bowel and bladder, pure-wick in place for urine management, assist of 3-4 to turn for assessment, ATR in use for turning and repositioning, order in place for bariatric low air loss mattress, is a moderate assist with care. Nutrition is following.     1. Bilateral sacrum, buttock, elbows, and heels- Skin is dry, intact, blanchable.     2. Left axilla MASD/ITD- Wounds are scattered irregular and linear in shape, partial thickness, 100% pink tissue, with small amount of serous drainage noted. Alison-wounds are intact, macerated skin, with red irregular rash.     3. Left breast skin fold MASD/ITD- Wounds are scattered, linear in shape, partial thickness, partial thickness, 100% pink tissue, with small amount of serosanguineous drainage noted. Alison-wounds are intact, macerated skin, with red irregular rash.     4. Right flank MASD/ITD- Wounds are scattered, linear in shape, partial thickness, partial thickness, 100% pink tissue, with small amount of serosanguineous drainage noted. Alison-wounds are intact, blanchable skin.    5. Left abdominal pannus MASD/ITD- Wounds are scattered, linear in shape, partial thickness, partial thickness, 100% pink tissue, with scant amount of serous drainage noted. Alison-wounds are intact, macerated skin, with red irregular rash.     6. Left lateral thigh- Wound is oval in shape, full-thickness with hypergranular tissue,100% pink hypergranular tissue, with moderate amount of serous drainage noted. Alison-wound is dry, intact, blanchable.     7. Right lateral distal abdomen and proximal thigh- Wounds are scattered, irregular in shape, partial  thickness, 100% pink tissue, with large amount of serous drainage noted. Alison-wounds are dry, intact, blanchable.     8. Bilateral posterior knee skin folds MASD-ITD- Wounds are linear in shape, partial thickness, 100% pink tissue, with scant amount of serous drainage noted. Alison-wounds are macerated, intact, with red irregular rash.     -Suspect fungal component to wound within skin folds at there is a red irregular rash with satellite lesions and mild foul odor.     - Patient educated on use of proper dressings to help treat and resolve the wounds, she continues to refuse/decline all treatment options besides Kenalog cream and ABD pads. Will see if patient is open to using an antifungal cream for treatment.     Educated patient on importance of frequent offloading of pressure via turning, repositioning, and weight-shifting.    No induration, fluctuance, or purulence noted to the above noted wound. New dressings applied. Patient tolerated well, reports mild to moderate pain to the wounds. Primary nurse aware of plan of care. See flow sheets for more detailed assessment findings. Will follow along.    Skin care plans:  1-Hydraguard/Silicone Cream to bilateral sacrum, buttock, and heels BID and PRN  2-Elevate heels to offload pressure.  3-Ehob cushion in chair when out of bed.  4-Moisturize skin daily with skin nourishing cream.  5-Turn/reposition q2h for pressure re-distribution on skin.   6- B/L Axilla, Breast skin folds, right flank skin folds, Abdominal pannus, and posterior knee skin folds- Cleanse wounds with soap and water, pat dry. Apply miconazole cream/ointment and then cover with ABD pad. Change 1-2 times a day or as needed for soilage/displacement.   7-B/L thigh and distal abdomen- Cleanse wounds with soap and water, pat dry. Apply ABD pads and cover with blue incontinence pads. Change 2 times a day and as needed.       Wound 11/07/24 Breast Lateral;Left (Active)   Wound Image    01/29/25 0903   Wound  Description [place] 01/29/25 0903   Alison-wound Assessment Dry;Intact;Rash 01/29/25 0903   Wound Length (cm) 6.5 cm 01/29/25 0903   Wound Width (cm) 10.5 cm 01/29/25 0903   Wound Depth (cm) 0.1 cm 01/29/25 0903   Wound Surface Area (cm^2) 68.25 cm^2 01/29/25 0903   Wound Volume (cm^3) 6.825 cm^3 01/29/25 0903   Calculated Wound Volume (cm^3) 6.83 cm^3 01/29/25 0903   Change in Wound Size % -113.44 01/29/25 0903   Drainage Amount Small 01/29/25 0903   Drainage Description Serosanguineous 01/29/25 0903   Non-staged Wound Description Partial thickness 01/29/25 0903   Treatments Cleansed;Site care 01/29/25 0903   Dressing ABD 01/29/25 0903   Wound packed? No 01/29/25 0903   Dressing Changed New 01/29/25 0903   Patient Tolerance Tolerated well 01/29/25 0903   Dressing Status Clean;Dry;Intact 01/29/25 0903       Wound 12/11/24 Axilla Left (Active)   Wound Image   01/29/25 0906   Wound Description Pink 01/29/25 0906   Alison-wound Assessment Pink;Maceration;Rash 01/29/25 0906   Wound Length (cm) 7.2 cm 01/29/25 0906   Wound Width (cm) 9.2 cm 01/29/25 0906   Wound Depth (cm) 0.1 cm 01/29/25 0906   Wound Surface Area (cm^2) 66.24 cm^2 01/29/25 0906   Wound Volume (cm^3) 6.624 cm^3 01/29/25 0906   Calculated Wound Volume (cm^3) 6.62 cm^3 01/29/25 0906   Change in Wound Size % 44.83 01/29/25 0906   Drainage Amount Small 01/29/25 0906   Drainage Description Serous 01/29/25 0906   Non-staged Wound Description Partial thickness 01/29/25 0906   Treatments Cleansed;Site care 01/29/25 0906   Dressing ABD 01/29/25 0906   Wound packed? No 01/29/25 0906   Dressing Changed New 01/29/25 0906   Patient Tolerance Tolerated well 01/29/25 0906   Dressing Status Clean;Dry;Intact 01/29/25 0906       Wound 12/11/24 Axilla Right (Active)   Wound Image   01/29/25 0905   Wound Description Dry;Intact 01/29/25 0905   Alison-wound Assessment Dry;Intact 01/29/25 0905   Wound Length (cm) 0 cm 01/29/25 0905   Wound Width (cm) 0 cm 01/29/25 0905   Wound Depth  (cm) 0 cm 01/29/25 0905   Wound Surface Area (cm^2) 0 cm^2 01/29/25 0905   Wound Volume (cm^3) 0 cm^3 01/29/25 0905   Calculated Wound Volume (cm^3) 0 cm^3 01/29/25 0905   Change in Wound Size % 100 01/29/25 0905       Wound 12/30/24 MASD Pelvis Anterior;Right (Active)   Wound Image   01/29/25 0908   Wound Description Pink 01/29/25 0908   Alison-wound Assessment Dry;Intact 01/29/25 0908   Wound Length (cm) 6 cm 01/29/25 0908   Wound Width (cm) 10 cm 01/29/25 0908   Wound Depth (cm) 0.1 cm 01/29/25 0908   Wound Surface Area (cm^2) 60 cm^2 01/29/25 0908   Wound Volume (cm^3) 6 cm^3 01/29/25 0908   Calculated Wound Volume (cm^3) 6 cm^3 01/29/25 0908   Change in Wound Size % -19900 01/29/25 0908   Drainage Amount Large 01/29/25 0908   Drainage Description Serous 01/29/25 0908   Non-staged Wound Description Partial thickness 01/29/25 0908   Treatments Cleansed;Site care 01/29/25 0908   Dressing ABD 01/29/25 0908   Wound packed? No 01/29/25 0908   Dressing Changed New 01/29/25 0908   Patient Tolerance Tolerated well 01/29/25 0908   Dressing Status Clean;Dry;Intact 01/29/25 0908       Wound 12/30/24 MASD Thigh Anterior;Left (Active)   Wound Image   01/29/25 0859   Wound Description Dry;Intact 01/29/25 0859   Alisno-wound Assessment Dry;Intact 01/29/25 0859   Wound Length (cm) 0 cm 01/29/25 0859   Wound Width (cm) 0 cm 01/29/25 0859   Wound Depth (cm) 0 cm 01/29/25 0859   Wound Surface Area (cm^2) 0 cm^2 01/29/25 0859   Wound Volume (cm^3) 0 cm^3 01/29/25 0859   Calculated Wound Volume (cm^3) 0 cm^3 01/29/25 0859       Wound 12/30/24 MASD Thigh Anterior;Right (Active)   Wound Image   01/29/25 0858   Wound Description Dry;Intact 01/29/25 0858   Alison-wound Assessment Dry;Intact 01/29/25 0858   Wound Length (cm) 0 cm 01/29/25 0858   Wound Width (cm) 0 cm 01/29/25 0858   Wound Depth (cm) 0 cm 01/29/25 0858   Wound Surface Area (cm^2) 0 cm^2 01/29/25 0858   Wound Volume (cm^3) 0 cm^3 01/29/25 0858   Calculated Wound Volume (cm^3) 0  cm^3 01/29/25 0858       Wound 01/29/25 Knee Posterior;Right (Active)   Wound Image   01/29/25 0852   Wound Description Pink 01/29/25 0852   Alison-wound Assessment Dry;Intact;Maceration;Rash 01/29/25 0852   Wound Length (cm) 0.5 cm 01/29/25 0852   Wound Width (cm) 2.3 cm 01/29/25 0852   Wound Depth (cm) 0.1 cm 01/29/25 0852   Wound Surface Area (cm^2) 1.15 cm^2 01/29/25 0852   Wound Volume (cm^3) 0.115 cm^3 01/29/25 0852   Calculated Wound Volume (cm^3) 0.12 cm^3 01/29/25 0852   Drainage Amount Scant 01/29/25 0852   Drainage Description Serous 01/29/25 0852   Non-staged Wound Description Partial thickness 01/29/25 0852   Treatments Cleansed;Site care 01/29/25 0852   Dressing ABD 01/29/25 0852   Wound packed? No 01/29/25 0852   Dressing Changed New 01/29/25 0852   Patient Tolerance Tolerated well 01/29/25 0852   Dressing Status Clean;Dry;Intact 01/29/25 0852       Wound 01/29/25 Knee Left;Posterior (Active)   Wound Image   01/29/25 0854   Wound Description Pink 01/29/25 0854   Alison-wound Assessment Pink;Maceration;Rash 01/29/25 0854   Wound Length (cm) 0.3 cm 01/29/25 0854   Wound Width (cm) 1.5 cm 01/29/25 0854   Wound Depth (cm) 0.1 cm 01/29/25 0854   Wound Surface Area (cm^2) 0.45 cm^2 01/29/25 0854   Wound Volume (cm^3) 0.045 cm^3 01/29/25 0854   Calculated Wound Volume (cm^3) 0.05 cm^3 01/29/25 0854   Drainage Amount Scant 01/29/25 0854   Drainage Description Serous 01/29/25 0854   Non-staged Wound Description Partial thickness 01/29/25 0854   Treatments Cleansed;Site care 01/29/25 0854   Dressing ABD 01/29/25 0854   Wound packed? No 01/29/25 0854   Dressing Changed New 01/29/25 0854   Patient Tolerance Tolerated well 01/29/25 0854   Dressing Status Clean;Dry;Intact 01/29/25 0854       Wound 01/29/25 Pelvis Anterior;Left (Active)   Wound Image   01/29/25 0902   Wound Description Pink 01/29/25 0902   Alison-wound Assessment Maceration;Pink;Rash 01/29/25 0902   Wound Length (cm) 0.2 cm 01/29/25 0902   Wound Width  (cm) 8 cm 01/29/25 0902   Wound Depth (cm) 0.1 cm 01/29/25 0902   Wound Surface Area (cm^2) 1.6 cm^2 01/29/25 0902   Wound Volume (cm^3) 0.16 cm^3 01/29/25 0902   Calculated Wound Volume (cm^3) 0.16 cm^3 01/29/25 0902   Drainage Amount Scant 01/29/25 0902   Drainage Description Serous 01/29/25 0902   Non-staged Wound Description Partial thickness 01/29/25 0902   Treatments Cleansed;Site care 01/29/25 0902   Dressing ABD 01/29/25 0902   Wound packed? No 01/29/25 0902   Dressing Changed New 01/29/25 0902   Patient Tolerance Tolerated well 01/29/25 0902   Dressing Status Clean;Dry;Intact 01/29/25 0902       Wound 01/29/25 Flank Right;Lower (Active)   Wound Image   01/29/25 0911   Wound Description Pink 01/29/25 0911   Alison-wound Assessment Maceration;Pink;Rash 01/29/25 0911   Wound Length (cm) 28 cm 01/29/25 0911   Wound Width (cm) 13 cm 01/29/25 0911   Wound Depth (cm) 0.1 cm 01/29/25 0911   Wound Surface Area (cm^2) 364 cm^2 01/29/25 0911   Wound Volume (cm^3) 36.4 cm^3 01/29/25 0911   Calculated Wound Volume (cm^3) 36.4 cm^3 01/29/25 0911   Drainage Amount Scant 01/29/25 0911   Drainage Description Serosanguineous 01/29/25 0911   Non-staged Wound Description Partial thickness 01/29/25 0911   Treatments Cleansed;Irrigation with NSS;Site care 01/29/25 0911   Dressing ABD 01/29/25 0911   Wound packed? No 01/29/25 0911   Dressing Changed New 01/29/25 0911   Patient Tolerance Tolerated well 01/29/25 0911   Dressing Status Clean;Dry 01/29/25 0911       Wound 01/29/25 Thigh Left;Lateral (Active)   Wound Image   01/29/25 0913   Wound Description Pink;Hypergranulation 01/29/25 0913   Alison-wound Assessment Dry;Intact;Pink 01/29/25 0913   Wound Length (cm) 1.5 cm 01/29/25 0913   Wound Width (cm) 2 cm 01/29/25 0913   Wound Depth (cm) 0.1 cm 01/29/25 0913   Wound Surface Area (cm^2) 3 cm^2 01/29/25 0913   Wound Volume (cm^3) 0.3 cm^3 01/29/25 0913   Calculated Wound Volume (cm^3) 0.3 cm^3 01/29/25 0913   Drainage Amount Moderate  01/29/25 0913   Drainage Description Serous 01/29/25 0913   Non-staged Wound Description Full thickness 01/29/25 0913   Treatments Cleansed;Site care 01/29/25 0913   Dressing ABD 01/29/25 0913   Wound packed? No 01/29/25 0913   Dressing Changed New 01/29/25 0913   Patient Tolerance Tolerated well 01/29/25 0913     Contact through [facility] Secure Chat with any questions  Wound Care will continue to follow while inpatient    Melissa ELIZONDON RN CWON  Wound and Ostomy care

## 2025-01-29 NOTE — DISCHARGE INSTR - OTHER ORDERS
Skin care plans:  1-Hydraguard/Silicone Cream to bilateral sacrum, buttock, and heels BID and PRN  2-Elevate heels to offload pressure.  3-Ehob cushion in chair when out of bed.  4-Moisturize skin daily with skin nourishing cream.  5-Turn/reposition q2h for pressure re-distribution on skin.   6- B/L Axilla, Breast skin folds, right abdominal pannus, B/L medial thigh skin folds, and posterior knee skin folds- Cleanse wounds with soap and water, pat dry. Apply miconazole cream/ointment and then cover with ABD pad. Change 1-2 times a day or as needed for soilage/displacement.   7-Right lateral thigh- Cleanse wounds with NSS, pat dry. Apply Silicone foam dressing over wound beds. Change every other day and as needed for soilage/displacement.   8-Bariatric low air loss mattress.

## 2025-01-29 NOTE — ASSESSMENT & PLAN NOTE
Lab Results   Component Value Date    HGBA1C 7.2 (H) 11/06/2024       Recent Labs     01/27/25  1756 01/27/25  1758 01/27/25  2130 01/28/25  0743   POCGLU 246* 123 107 88       Blood Sugar Average: Last 72 hrs:  (P) 118.6705189080049939  Patient denies any history of diabetes.  Monitor glucose levels on BMP as needed

## 2025-01-29 NOTE — PLAN OF CARE
Problem: INFECTION - ADULT  Goal: Absence or prevention of progression during hospitalization  Description: INTERVENTIONS:  - Assess and monitor for signs and symptoms of infection  - Monitor lab/diagnostic results  - Monitor all insertion sites, i.e. indwelling lines, tubes, and drains  - Monitor endotracheal if appropriate and nasal secretions for changes in amount and color  - [place] appropriate cooling/warming therapies per order  - Administer medications as ordered  - Instruct and encourage patient and family to use good hand hygiene technique  - Identify and instruct in appropriate isolation precautions for identified infection/condition  Outcome: Progressing     Problem: PAIN - ADULT  Goal: Verbalizes/displays adequate comfort level or baseline comfort level  Description: Interventions:  - Encourage patient to monitor pain and request assistance  - Assess pain using appropriate pain scale  - Administer analgesics based on type and severity of pain and evaluate response  - Implement non-pharmacological measures as appropriate and evaluate response  - Consider cultural and social influences on pain and pain management  - Notify physician/advanced practitioner if interventions unsuccessful or patient reports new pain  Outcome: Progressing

## 2025-01-29 NOTE — ASSESSMENT & PLAN NOTE
Patient with redness that started just prior to admission, right side with drainage. Caregiver packing area w/ gauze  Images reviewed in the chart  IV Lasix  CT imaging with no obvious abscess  Patient was not candidate for panniculectomy in the past  Continue cefepime  Continue supportive care  Wound care following

## 2025-01-29 NOTE — PROGRESS NOTES
Progress Note - Hospitalist   Name: Zee Kirk 74 y.o. female I MRN: [id number]  Unit/Bed#: -[place] I Date of Admission: 1/26/2025   Date of Service: 1/29/2025 I Hospital Day: 2    Assessment & Plan  Panniculitis  Patient with redness that started just prior to admission, right side with drainage. Caregiver packing area w/ gauze  Images reviewed in the chart  IV Lasix  CT imaging with no obvious abscess  Patient was not candidate for panniculectomy in the past  Continue cefepime  Continue supportive care  Wound care following    History of Clostridioides difficile infection  Will continue oral vancomycin while on antibiotics  Hypothyroid  Continue levothyroxine  Class 3 severe obesity due to excess calories with serious comorbidity and body mass index (BMI) greater than or equal to 70 in adult ([facility])  BMI 79  Healthy diet and lifestyle modification recommended  Longstanding persistent atrial fibrillation ([facility])  Currently rate controlled  Coumadin for anticoagulation  Chronic heart failure with preserved ejection fraction ([facility])  Wt Readings from Last 3 Encounters:   01/29/25 (!) 205 kg (452 lb 13.2 oz)   01/17/25 (!) 205 kg (451 lb)   01/01/25 (!) 205 kg (451 lb 14.4 oz)     Patient reported she was not taking her Lasix more than once a day at home due to low blood pressures  States her weight is currently at least 20 lbs above her baseline weight  Bilateral lower extremity edema pitting to the thighs  Continue IV Lasix  Discussed with outpatient nephrologist will increase midodrine from 5 mg to 10 mg 3 times a day to help support blood pressure  Patient also received dose of albumin x 1  Continue to monitor fluid status closely  Monitor output/daily weight  Intake and output monitoring  Type 2 diabetes mellitus without complication, without long-term current use of insulin ([facility])  Lab Results   Component Value Date    HGBA1C 7.2 (H) 11/06/2024       Recent Labs     01/27/25  1756 01/27/25  1758  01/27/25  2130 01/28/25  0743   POCGLU 246* 123 107 88       Blood Sugar Average: Last 72 hrs:  (P) 118.1552783608465135  Patient denies any history of diabetes.  Monitor glucose levels on BMP as needed    VTE Pharmacologic Prophylaxis: VTE Score: 8 High Risk (Score >/= 5) - Pharmacological DVT Prophylaxis Ordered: warfarin (Coumadin). Sequential Compression Devices Ordered.    Mobility:   Basic Mobility Inpatient Raw Score: 7  JH-HLM Goal: 2: Bed activities/Dependent transfer  JH-HLM Achieved: 2: Bed activities/Dependent transfer  JH-HLM Goal achieved. Continue to encourage appropriate mobility.    Patient Centered Rounds: I performed bedside rounds with nursing staff today.   Discussions with Specialists or Other Care Team Provider: Nursing, wound care, PT OT, case management    Education and Discussions with Family / Patient: Patient declined call to .     Current Length of Stay: 2 day(s)  Current Patient Status: Inpatient   Certification Statement: The patient will continue to require additional inpatient hospital stay due to volume overload requiring IV Lasix, close monitoring of fluid status, panniculitis on IV antibiotics  Discharge Plan:  Patient is bedbound home with caretakers at baseline.  She is at least 20 pounds over her dry weight today we will continue IV Lasix.  Increased midodrine to support blood pressure.  Continue to monitor fluid status closely.  Also continue IV antibiotics for panniculitis.  Repeat labs in the AM.    Code Status: Level 1 - Full Code    Subjective   Reports bilateral lower extremity edema and some drainage from the right side of her abdomen.  Readily verbalizes needs.  States she is tired today.    Objective :  Temp:  [98.6 °F (37 °C)] 98.6 °F (37 °C)  HR:  [62-67] 62  BP: ()/(44-58) 127/58  Resp:  [16-19] 16  SpO2:  [95 %-96 %] 96 %  O2 Device: Nasal cannula    Body mass index is 80.21 kg/m².     Input and Output Summary (last 24 hours):     Intake/Output  Summary (Last 24 hours) at 1/29/2025 1354  Last data filed at 1/29/2025 0900  Gross per 24 hour   Intake 240 ml   Output 550 ml   Net -310 ml       Physical Exam  Vitals reviewed.   Constitutional:       General: She is not in acute distress.     Appearance: She is well-developed. She is obese.   HENT:      Head: Normocephalic and atraumatic.   Cardiovascular:      Rate and Rhythm: Normal rate. Rhythm irregular.      Pulses: Normal pulses.      Heart sounds: Normal heart sounds. No murmur heard.  Pulmonary:      Effort: Pulmonary effort is normal. No respiratory distress.      Breath sounds: Normal breath sounds. No wheezing or rales.      Comments: Nonlabored respirations at rest on O2 2 L nasal cannula  Abdominal:      General: There is no distension.      Palpations: Abdomen is soft.      Tenderness: There is no abdominal tenderness. There is no guarding.   Musculoskeletal:         General: Swelling present.      Right lower leg: Edema present.      Left lower leg: Edema present.   Skin:     General: Skin is warm and dry.      Capillary Refill: Capillary refill takes less than 2 seconds.   Neurological:      General: No focal deficit present.      Mental Status: She is alert and oriented to person, place, and time. Mental status is at baseline.   Psychiatric:         Mood and Affect: Mood normal.         Behavior: Behavior normal.         Thought Content: Thought content normal.         Judgment: Judgment normal.           Lines/Drains:  Lines/Drains/Airways       Active Status       Name Placement date Placement time Site Days    External Urinary Catheter 01/27/25  0255  -- 2                            Lab Results: I have reviewed the following results:   Results from last 7 days   Lab Units 01/29/25  0508 01/28/25  0438   WBC Thousand/uL 8.74 8.29   HEMOGLOBIN g/dL 9.4* 9.7*   HEMATOCRIT % 34.2* 35.4   PLATELETS Thousands/uL 381 391*   SEGS PCT %  --  83*   LYMPHO PCT %  --  8*   MONO PCT %  --  5   EOS PCT %   --  3     Results from last 7 days   Lab Units 01/29/25  0508 01/27/25  0544 01/26/25  1416   SODIUM mmol/L 138   < > 137   POTASSIUM mmol/L 4.3   < > 4.0   CHLORIDE mmol/L 98   < > 96   CO2 mmol/L 34*   < > 35*   BUN mg/dL 23   < > 24   CREATININE mg/dL 1.20   < > 1.13   ANION GAP mmol/L 6   < > 6   CALCIUM mg/dL 7.4*   < > 8.8   ALBUMIN g/dL  --   --  2.7*   TOTAL BILIRUBIN mg/dL  --   --  0.79   ALK PHOS U/L  --   --  78   ALT U/L  --   --  4*   AST U/L  --   --  12*   GLUCOSE RANDOM mg/dL 97   < > 98    < > = values in this interval not displayed.     Results from last 7 days   Lab Units 01/29/25  0508   INR  2.11*     Results from last 7 days   Lab Units 01/28/25  0743 01/27/25  2130 01/27/25  1758 01/27/25  1756 01/27/25  1221 01/27/25  0754 01/27/25  0149   POC GLUCOSE mg/dl 88 107 123 246* 87 84 96         Results from last 7 days   Lab Units 01/27/25  0544 01/26/25  1416   PROCALCITONIN ng/ml 0.06 <0.05       Recent Cultures (last 7 days):         Imaging Results Review: I reviewed radiology reports from this admission including: CT abdomen pelvis.  Other Study Results Review: No additional pertinent studies reviewed.    Last 24 Hours Medication List:     Current Facility-Administered Medications:     acetaminophen (TYLENOL) tablet 650 mg, Q4H PRN    allopurinol (ZYLOPRIM) tablet 100 mg, Daily    cefepime (MAXIPIME) 2 g/50 mL dextrose IVPB, Q12H TEODORO, Last Rate: 2,000 mg (01/29/25 1029)    furosemide (LASIX) injection 80 mg, BID (diuretic)    gabapentin (NEURONTIN) capsule 200 mg, TID    levothyroxine tablet 137 mcg, Daily    midodrine (PROAMATINE) tablet 10 mg, TID AC    morphine injection 2 mg, Q6H PRN    ondansetron (ZOFRAN) injection 4 mg, Q6H PRN    oxyCODONE (ROXICODONE) IR tablet 5 mg, Q6H PRN    vancomycin (VANCOCIN) capsule 125 mg, Q12H TEODORO    warfarin (COUMADIN) tablet 2 mg, Daily (warfarin)    Administrative Statements   Today, Patient Was Seen By: ORACIO Last  I have spent a total  time of 39 minutes in caring for this patient on the day of the visit/encounter including Diagnostic results, Patient and family education, Documenting in the medical record, Reviewing / ordering tests, medicine, procedures  , and Communicating with other healthcare professionals .    **Please Note: This note may have been constructed using a voice recognition system.**

## 2025-01-29 NOTE — PLAN OF CARE
Problem: Prexisting or High Potential for Compromised Skin Integrity  Goal: Skin integrity is maintained or improved  Description: INTERVENTIONS:  - Identify patients at risk for skin breakdown  - Assess and monitor skin integrity  - Assess and monitor nutrition and hydration status  - Monitor labs   - Assess for incontinence   - Turn and reposition patient  - Assist with mobility/ambulation  - Relieve pressure over bony prominences  - Avoid friction and shearing  - Provide appropriate hygiene as needed including keeping skin clean and dry  - Evaluate need for skin moisturizer/barrier cream  - Collaborate with interdisciplinary team   - Patient/family teaching  - Consider wound care consult   Outcome: Progressing     Problem: PAIN - ADULT  Goal: Verbalizes/displays adequate comfort level or baseline comfort level  Description: Interventions:  - Encourage patient to monitor pain and request assistance  - Assess pain using appropriate pain scale  - Administer analgesics based on type and severity of pain and evaluate response  - Implement non-pharmacological measures as appropriate and evaluate response  - Consider cultural and social influences on pain and pain management  - Notify physician/advanced practitioner if interventions unsuccessful or patient reports new pain  Outcome: Progressing     Problem: INFECTION - ADULT  Goal: Absence or prevention of progression during hospitalization  Description: INTERVENTIONS:  - Assess and monitor for signs and symptoms of infection  - Monitor lab/diagnostic results  - Monitor all insertion sites, i.e. indwelling lines, tubes, and drains  - Monitor endotracheal if appropriate and nasal secretions for changes in amount and color  - [place] appropriate cooling/warming therapies per order  - Administer medications as ordered  - Instruct and encourage patient and family to use good hand hygiene technique  - Identify and instruct in appropriate isolation precautions for  identified infection/condition  Outcome: Progressing     Problem: SAFETY ADULT  Goal: Patient will remain free of falls  Description: INTERVENTIONS:  - Educate patient/family on patient safety including physical limitations  - Instruct patient to call for assistance with activity   - Consult OT/PT to assist with strengthening/mobility   - Keep Call bell within reach  - Keep bed low and locked with side rails adjusted as appropriate  - Keep care items and personal belongings within reach  - Initiate and maintain comfort rounds  - Make Fall Risk Sign visible to staff  - Offer Toileting every 2 Hours, in advance of need  - Initiate/Maintain bed alarm  - Obtain necessary fall risk management equipment: socks  - Apply yellow socks and bracelet for high fall risk patients  - Consider moving patient to room near nurses station  Outcome: Progressing        Problem: Knowledge Deficit  Goal: Patient/family/caregiver demonstrates understanding of disease process, treatment plan, medications, and discharge instructions  Description: Complete learning assessment and assess knowledge base.  Interventions:  - Provide teaching at level of understanding  - Provide teaching via preferred learning methods  Outcome: Progressing     Problem: GASTROINTESTINAL - ADULT  Goal: Maintains or returns to baseline bowel function  Description: INTERVENTIONS:  - Assess bowel function  - Encourage oral fluids to ensure adequate hydration  - Administer IV fluids if ordered to ensure adequate hydration  - Administer ordered medications as needed  - Encourage mobilization and activity  - Consider nutritional services referral to assist patient with adequate nutrition and appropriate food choices  Outcome: Progressing     Problem: GASTROINTESTINAL - ADULT  Goal: Establish and maintain optimal ostomy function  Description: INTERVENTIONS:  - Assess bowel function  - Encourage oral fluids to ensure adequate hydration  - Administer IV fluids if ordered to  ensure adequate hydration   - Administer ordered medications as needed  - Encourage mobilization and activity  - Nutrition services referral to assist patient with appropriate food choices  - Assess stoma site  - Consider wound care consult   Outcome: Progressing     Problem: Nutrition/Hydration-ADULT  Goal: Nutrient/Hydration intake appropriate for improving, restoring or maintaining nutritional needs  Description: Monitor and assess patient's nutrition/hydration status for malnutrition. Collaborate with interdisciplinary team and initiate plan and interventions as ordered.  Monitor patient's weight and dietary intake as ordered or per policy. Utilize nutrition screening tool and intervene as necessary. Determine patient's food preferences and provide high-protein, high-caloric foods as appropriate.     INTERVENTIONS:  - Monitor oral intake, urinary output, labs, and treatment plans  - Assess nutrition and hydration status and recommend course of action  - Evaluate amount of meals eaten  - Assist patient with eating if necessary   - Allow adequate time for meals  - Recommend/ encourage appropriate diets, oral nutritional supplements, and vitamin/mineral supplements  - Order, calculate, and assess calorie counts as needed  - Recommend, monitor, and adjust tube feedings and TPN/PPN based on assessed needs  - Assess need for intravenous fluids  - Provide specific nutrition/hydration education as appropriate  - Include patient/family/caregiver in decisions related to nutrition  Outcome: Progressing

## 2025-01-30 PROBLEM — Z71.89 GOALS OF CARE, COUNSELING/DISCUSSION: Status: ACTIVE | Noted: 2025-01-30

## 2025-01-30 LAB
ANION GAP SERPL CALCULATED.3IONS-SCNC: 5 MMOL/L (ref 4–13)
BUN SERPL-MCNC: 26 MG/DL (ref 5–25)
CALCIUM SERPL-MCNC: 7.5 MG/DL (ref 8.4–10.2)
CHLORIDE SERPL-SCNC: 97 MMOL/L (ref 96–108)
CO2 SERPL-SCNC: 36 MMOL/L (ref 21–32)
CREAT SERPL-MCNC: 1.19 MG/DL (ref 0.6–1.3)
ERYTHROCYTE [DISTWIDTH] IN BLOOD BY AUTOMATED COUNT: 17 % (ref 11.6–15.1)
GFR SERPL CREATININE-BSD FRML MDRD: 45 ML/MIN/1.73SQ M
GLUCOSE SERPL-MCNC: 98 MG/DL (ref 65–140)
GLUCOSE SERPL-MCNC: 99 MG/DL (ref 65–140)
HCT VFR BLD AUTO: 33.7 % (ref 34.8–46.1)
HGB BLD-MCNC: 9.1 G/DL (ref 11.5–15.4)
INR PPP: 1.96 (ref 0.85–1.19)
MCH RBC QN AUTO: 24.1 PG (ref 26.8–34.3)
MCHC RBC AUTO-ENTMCNC: 27 G/DL (ref 31.4–37.4)
MCV RBC AUTO: 89 FL (ref 82–98)
PLATELET # BLD AUTO: 397 THOUSANDS/UL (ref 149–390)
PMV BLD AUTO: 8.7 FL (ref 8.9–12.7)
POTASSIUM SERPL-SCNC: 4.1 MMOL/L (ref 3.5–5.3)
PROCALCITONIN SERPL-MCNC: 0.08 NG/ML
PROTHROMBIN TIME: 22.7 SECONDS (ref 12.3–15)
RBC # BLD AUTO: 3.77 MILLION/UL (ref 3.81–5.12)
SODIUM SERPL-SCNC: 138 MMOL/L (ref 135–147)
WBC # BLD AUTO: 10.34 THOUSAND/UL (ref 4.31–10.16)

## 2025-01-30 PROCEDURE — 82948 REAGENT STRIP/BLOOD GLUCOSE: CPT

## 2025-01-30 PROCEDURE — 84145 PROCALCITONIN (PCT): CPT

## 2025-01-30 PROCEDURE — 99233 SBSQ HOSP IP/OBS HIGH 50: CPT

## 2025-01-30 PROCEDURE — 80048 BASIC METABOLIC PNL TOTAL CA: CPT

## 2025-01-30 PROCEDURE — 85027 COMPLETE CBC AUTOMATED: CPT

## 2025-01-30 PROCEDURE — 85610 PROTHROMBIN TIME: CPT

## 2025-01-30 RX ORDER — DOCUSATE SODIUM 100 MG/1
100 CAPSULE, LIQUID FILLED ORAL 2 TIMES DAILY
Status: DISCONTINUED | OUTPATIENT
Start: 2025-01-30 | End: 2025-02-06

## 2025-01-30 RX ORDER — SODIUM PHOSPHATE,MONO-DIBASIC 19G-7G/118
1 ENEMA (ML) RECTAL ONCE
Status: COMPLETED | OUTPATIENT
Start: 2025-01-30 | End: 2025-01-30

## 2025-01-30 RX ORDER — FUROSEMIDE 10 MG/ML
80 INJECTION INTRAMUSCULAR; INTRAVENOUS
Status: DISCONTINUED | OUTPATIENT
Start: 2025-01-31 | End: 2025-01-31

## 2025-01-30 RX ORDER — WARFARIN SODIUM 3 MG/1
3 TABLET ORAL
Status: DISCONTINUED | OUTPATIENT
Start: 2025-01-30 | End: 2025-02-01

## 2025-01-30 RX ORDER — POLYETHYLENE GLYCOL 3350 17 G/17G
17 POWDER, FOR SOLUTION ORAL DAILY PRN
Status: DISCONTINUED | OUTPATIENT
Start: 2025-01-30 | End: 2025-02-06

## 2025-01-30 RX ORDER — SENNOSIDES 8.6 MG
1 TABLET ORAL
Status: DISCONTINUED | OUTPATIENT
Start: 2025-01-30 | End: 2025-02-06

## 2025-01-30 RX ORDER — ALBUMIN (HUMAN) 12.5 G/50ML
25 SOLUTION INTRAVENOUS ONCE
Status: COMPLETED | OUTPATIENT
Start: 2025-01-30 | End: 2025-01-30

## 2025-01-30 RX ADMIN — FUROSEMIDE 80 MG: 10 INJECTION, SOLUTION INTRAMUSCULAR; INTRAVENOUS at 07:08

## 2025-01-30 RX ADMIN — SODIUM PHOSPHATE, DIBASIC AND SODIUM PHOSPHATE, MONOBASIC 1 ENEMA: 7; 19 ENEMA RECTAL at 11:26

## 2025-01-30 RX ADMIN — LEVOTHYROXINE SODIUM 137 MCG: 25 TABLET ORAL at 09:37

## 2025-01-30 RX ADMIN — VANCOMYCIN HYDROCHLORIDE 125 MG: 125 CAPSULE ORAL at 09:38

## 2025-01-30 RX ADMIN — GABAPENTIN 200 MG: 100 CAPSULE ORAL at 09:37

## 2025-01-30 RX ADMIN — WARFARIN SODIUM 3 MG: 3 TABLET ORAL at 18:02

## 2025-01-30 RX ADMIN — CEFEPIME 2000 MG: 2 INJECTION, POWDER, FOR SOLUTION INTRAVENOUS at 09:51

## 2025-01-30 RX ADMIN — DOCUSATE SODIUM 100 MG: 100 CAPSULE, LIQUID FILLED ORAL at 11:29

## 2025-01-30 RX ADMIN — MIDODRINE HYDROCHLORIDE 10 MG: 5 TABLET ORAL at 18:02

## 2025-01-30 RX ADMIN — GABAPENTIN 200 MG: 100 CAPSULE ORAL at 21:31

## 2025-01-30 RX ADMIN — ALLOPURINOL 100 MG: 100 TABLET ORAL at 09:38

## 2025-01-30 RX ADMIN — GABAPENTIN 200 MG: 100 CAPSULE ORAL at 18:02

## 2025-01-30 RX ADMIN — VANCOMYCIN HYDROCHLORIDE 125 MG: 125 CAPSULE ORAL at 00:12

## 2025-01-30 RX ADMIN — MICONAZOLE NITRATE: 20 CREAM TOPICAL at 21:34

## 2025-01-30 RX ADMIN — ALBUMIN (HUMAN) 25 G: 0.25 INJECTION, SOLUTION INTRAVENOUS at 18:44

## 2025-01-30 RX ADMIN — DOCUSATE SODIUM 100 MG: 100 CAPSULE, LIQUID FILLED ORAL at 21:33

## 2025-01-30 RX ADMIN — VANCOMYCIN HYDROCHLORIDE 125 MG: 125 CAPSULE ORAL at 21:32

## 2025-01-30 RX ADMIN — MIDODRINE HYDROCHLORIDE 10 MG: 5 TABLET ORAL at 07:06

## 2025-01-30 RX ADMIN — GABAPENTIN 200 MG: 100 CAPSULE ORAL at 00:12

## 2025-01-30 RX ADMIN — CEFEPIME 2000 MG: 2 INJECTION, POWDER, FOR SOLUTION INTRAVENOUS at 21:31

## 2025-01-30 RX ADMIN — SENNOSIDES 8.6 MG: 8.6 TABLET, FILM COATED ORAL at 21:31

## 2025-01-30 RX ADMIN — CEFEPIME 2000 MG: 2 INJECTION, POWDER, FOR SOLUTION INTRAVENOUS at 00:11

## 2025-01-30 RX ADMIN — MIDODRINE HYDROCHLORIDE 10 MG: 5 TABLET ORAL at 11:29

## 2025-01-30 NOTE — PLAN OF CARE
Problem: Prexisting or High Potential for Compromised Skin Integrity  Goal: Skin integrity is maintained or improved  Description: INTERVENTIONS:  - Identify patients at risk for skin breakdown  - Assess and monitor skin integrity  - Assess and monitor nutrition and hydration status  - Monitor labs   - Assess for incontinence   - Turn and reposition patient  - Assist with mobility/ambulation  - Relieve pressure over bony prominences  - Avoid friction and shearing  - Provide appropriate hygiene as needed including keeping skin clean and dry  - Evaluate need for skin moisturizer/barrier cream  - Collaborate with interdisciplinary team   - Patient/family teaching  - Consider wound care consult   Outcome: Progressing     Problem: PAIN - ADULT  Goal: Verbalizes/displays adequate comfort level or baseline comfort level  Description: Interventions:  - Encourage patient to monitor pain and request assistance  - Assess pain using appropriate pain scale  - Administer analgesics based on type and severity of pain and evaluate response  - Implement non-pharmacological measures as appropriate and evaluate response  - Consider cultural and social influences on pain and pain management  - Notify physician/advanced practitioner if interventions unsuccessful or patient reports new pain  Outcome: Progressing     Problem: INFECTION - ADULT  Goal: Absence or prevention of progression during hospitalization  Description: INTERVENTIONS:  - Assess and monitor for signs and symptoms of infection  - Monitor lab/diagnostic results  - Monitor all insertion sites, i.e. indwelling lines, tubes, and drains  - Monitor endotracheal if appropriate and nasal secretions for changes in amount and color  - [place] appropriate cooling/warming therapies per order  - Administer medications as ordered  - Instruct and encourage patient and family to use good hand hygiene technique  - Identify and instruct in appropriate isolation precautions for  identified infection/condition  Outcome: Progressing  Goal: Absence of fever/infection during neutropenic period  Description: INTERVENTIONS:  - Monitor WBC    Outcome: Progressing     Problem: SAFETY ADULT  Goal: Patient will remain free of falls  Description: INTERVENTIONS:  - Educate patient/family on patient safety including physical limitations  - Instruct patient to call for assistance with activity   - Consult OT/PT to assist with strengthening/mobility   - Keep Call bell within reach  - Keep bed low and locked with side rails adjusted as appropriate  - Keep care items and personal belongings within reach  - Initiate and maintain comfort rounds  - Make Fall Risk Sign visible to staff  - Offer Toileting every 2 Hours, in advance of need  - Initiate/Maintain bed alarm  - Obtain necessary fall risk management equipment: bed alarm  - Apply yellow socks and bracelet for high fall risk patients  - Consider moving patient to room near nurses station  Outcome: Progressing  Goal: Maintain or return to baseline ADL function  Description: INTERVENTIONS:  -  Assess patient's ability to carry out ADLs; assess patient's baseline for ADL function and identify physical deficits which impact ability to perform ADLs (bathing, care of mouth/teeth, toileting, grooming, dressing, etc.)  - Assess/evaluate cause of self-care deficits   - Assess range of motion  - Assess patient's mobility; develop plan if impaired  - Assess patient's need for assistive devices and provide as appropriate  - Encourage maximum independence but intervene and supervise when necessary  - Involve family in performance of ADLs  - Assess for home care needs following discharge   - Consider OT consult to assist with ADL evaluation and planning for discharge  - Provide patient education as appropriate  Outcome: Progressing  Goal: Maintains/Returns to pre admission functional level  Description: INTERVENTIONS:  - Perform AM-PAC 6 Click Basic Mobility/ Daily  Activity assessment daily.  - Set and communicate daily mobility goal to care team and patient/family/caregiver.   - Collaborate with rehabilitation services on mobility goals if consulted  - Perform Range of Motion 2 times a day.  - Reposition patient every 2 hours.  - Dangle patient 2 times a day  - Stand patient 2 times a day  - Ambulate patient 2 times a day  - Out of bed to chair 2 times a day   - Out of bed for meals 2 times a day  - Out of bed for toileting  - Record patient progress and toleration of activity level   Outcome: Progressing     Problem: DISCHARGE PLANNING  Goal: Discharge to home or other facility with appropriate resources  Description: INTERVENTIONS:  - Identify barriers to discharge w/patient and caregiver  - Arrange for needed discharge resources and transportation as appropriate  - Identify discharge learning needs (meds, wound care, etc.)  - Arrange for interpretive services to assist at discharge as needed  - Refer to Case Management Department for coordinating discharge planning if the patient needs post-hospital services based on physician/advanced practitioner order or complex needs related to functional status, cognitive ability, or social support system  Outcome: Progressing     Problem: Knowledge Deficit  Goal: Patient/family/caregiver demonstrates understanding of disease process, treatment plan, medications, and discharge instructions  Description: Complete learning assessment and assess knowledge base.  Interventions:  - Provide teaching at level of understanding  - Provide teaching via preferred learning methods  Outcome: Progressing     Problem: GASTROINTESTINAL - ADULT  Goal: Minimal or absence of nausea and/or vomiting  Description: INTERVENTIONS:  - Administer IV fluids if ordered to ensure adequate hydration  - Maintain NPO status until nausea and vomiting are resolved  - Nasogastric tube if ordered  - Administer ordered antiemetic medications as needed  - Provide  nonpharmacologic comfort measures as appropriate  - Advance diet as tolerated, if ordered  - Consider nutrition services referral to assist patient with adequate nutrition and appropriate food choices  Outcome: Progressing  Goal: Maintains or returns to baseline bowel function  Description: INTERVENTIONS:  - Assess bowel function  - Encourage oral fluids to ensure adequate hydration  - Administer IV fluids if ordered to ensure adequate hydration  - Administer ordered medications as needed  - Encourage mobilization and activity  - Consider nutritional services referral to assist patient with adequate nutrition and appropriate food choices  Outcome: Progressing  Goal: Maintains adequate nutritional intake  Description: INTERVENTIONS:  - Monitor percentage of each meal consumed  - Identify factors contributing to decreased intake, treat as appropriate  - Assist with meals as needed  - Monitor I&O, weight, and lab values if indicated  - Obtain nutrition services referral as needed  Outcome: Progressing  Goal: Establish and maintain optimal ostomy function  Description: INTERVENTIONS:  - Assess bowel function  - Encourage oral fluids to ensure adequate hydration  - Administer IV fluids if ordered to ensure adequate hydration   - Administer ordered medications as needed  - Encourage mobilization and activity  - Nutrition services referral to assist patient with appropriate food choices  - Assess stoma site  - Consider wound care consult   Outcome: Progressing  Goal: Oral mucous membranes remain intact  Description: INTERVENTIONS  - Assess oral mucosa and hygiene practices  - Implement preventative oral hygiene regimen  - Implement oral medicated treatments as ordered  - Initiate Nutrition services referral as needed  Outcome: Progressing     Problem: Nutrition/Hydration-ADULT  Goal: Nutrient/Hydration intake appropriate for improving, restoring or maintaining nutritional needs  Description: Monitor and assess patient's  nutrition/hydration status for malnutrition. Collaborate with interdisciplinary team and initiate plan and interventions as ordered.  Monitor patient's weight and dietary intake as ordered or per policy. Utilize nutrition screening tool and intervene as necessary. Determine patient's food preferences and provide high-protein, high-caloric foods as appropriate.     INTERVENTIONS:  - Monitor oral intake, urinary output, labs, and treatment plans  - Assess nutrition and hydration status and recommend course of action  - Evaluate amount of meals eaten  - Assist patient with eating if necessary   - Allow adequate time for meals  - Recommend/ encourage appropriate diets, oral nutritional supplements, and vitamin/mineral supplements  - Order, calculate, and assess calorie counts as needed  - Recommend, monitor, and adjust tube feedings and TPN/PPN based on assessed needs  - Assess need for intravenous fluids  - Provide specific nutrition/hydration education as appropriate  - Include patient/family/caregiver in decisions related to nutrition  Outcome: Progressing

## 2025-01-30 NOTE — ASSESSMENT & PLAN NOTE
Wt Readings from Last 3 Encounters:   01/30/25 (!) 205 kg (452 lb 6.1 oz)   01/17/25 (!) 205 kg (451 lb)   01/01/25 (!) 205 kg (451 lb 14.4 oz)     Patient reported she was not taking her Lasix more than once a day at home due to low blood pressures  States her weight is currently at least 20 lbs above her baseline weight  Bilateral lower extremity edema pitting to the thighs  Continue IV Lasix  Continue to monitor fluid status closely  Continue midodrine blood pressure improved today  Monitor output/daily weight  Intake and output monitoring

## 2025-01-30 NOTE — PLAN OF CARE
Problem: Prexisting or High Potential for Compromised Skin Integrity  Goal: Skin integrity is maintained or improved  Description: INTERVENTIONS:  - Identify patients at risk for skin breakdown  - Assess and monitor skin integrity  - Assess and monitor nutrition and hydration status  - Monitor labs   - Assess for incontinence   - Turn and reposition patient  - Assist with mobility/ambulation  - Relieve pressure over bony prominences  - Avoid friction and shearing  - Provide appropriate hygiene as needed including keeping skin clean and dry  - Evaluate need for skin moisturizer/barrier cream  - Collaborate with interdisciplinary team   - Patient/family teaching  - Consider wound care consult   Outcome: Not Progressing     Problem: GASTROINTESTINAL - ADULT  Goal: Minimal or absence of nausea and/or vomiting  Description: INTERVENTIONS:  - Administer IV fluids if ordered to ensure adequate hydration  - Maintain NPO status until nausea and vomiting are resolved  - Nasogastric tube if ordered  - Administer ordered antiemetic medications as needed  - Provide nonpharmacologic comfort measures as appropriate  - Advance diet as tolerated, if ordered  - Consider nutrition services referral to assist patient with adequate nutrition and appropriate food choices  Outcome: Not Progressing

## 2025-01-30 NOTE — ASSESSMENT & PLAN NOTE
Lab Results   Component Value Date    HGBA1C 7.2 (H) 11/06/2024       Recent Labs     01/27/25  1758 01/27/25  2130 01/28/25  0743 01/30/25  0804   POCGLU 123 107 88 98       Blood Sugar Average: Last 72 hrs:  (P) 116.125  Patient denies any history of diabetes.  Monitor glucose levels on BMP as needed

## 2025-01-30 NOTE — PROGRESS NOTES
Progress Note - Hospitalist   Name: Zee Kirk 74 y.o. female I MRN: [id number]  Unit/Bed#: -01 I Date of Admission: 1/26/2025   Date of Service: 1/30/2025 I Hospital Day: 3    Assessment & Plan  Panniculitis  Patient with redness that started just prior to admission, right side with drainage. Caregiver packing area w/ gauze  Images reviewed in the chart  IV Lasix  CT imaging with no obvious abscess  Patient was not candidate for panniculectomy in the past  Continue cefepime  Continue supportive care  Wound care following    History of Clostridioides difficile infection  Will continue oral vancomycin while on antibiotics  Hypothyroid  Continue levothyroxine  Class 3 severe obesity due to excess calories with serious comorbidity and body mass index (BMI) greater than or equal to 70 in adult ([facility])  BMI 79  Healthy diet and lifestyle modification recommended  Longstanding persistent atrial fibrillation ([facility])  Currently rate controlled  Coumadin for anticoagulation  Chronic heart failure with preserved ejection fraction ([facility])  Wt Readings from Last 3 Encounters:   01/30/25 (!) 205 kg (452 lb 6.1 oz)   01/17/25 (!) 205 kg (451 lb)   01/01/25 (!) 205 kg (451 lb 14.4 oz)     Patient reported she was not taking her Lasix more than once a day at home due to low blood pressures  States her weight is currently at least 20 lbs above her baseline weight  Bilateral lower extremity edema pitting to the thighs  Continue IV Lasix  Continue to monitor fluid status closely  Continue midodrine blood pressure improved today  Monitor output/daily weight  Intake and output monitoring  Type 2 diabetes mellitus without complication, without long-term current use of insulin ([facility])  Lab Results   Component Value Date    HGBA1C 7.2 (H) 11/06/2024       Recent Labs     01/27/25  1758 01/27/25  2130 01/28/25  0743 01/30/25  0804   POCGLU 123 107 88 98       Blood Sugar Average: Last 72 hrs:  (P) 116.125  Patient denies any  history of diabetes.  Monitor glucose levels on BMP as needed  Goals of care, counseling/discussion  At length discussion with the patient regarding her multiple comorbidities and frequent hospitalizations.  We discussed that it has been difficult to manage her fluid volume status as she has been hypotensive and having to hold her Lasix in the outpatient setting despite the initiation of midodrine.  She is aware that we increased her midodrine yesterday per discussion with her nephrologist and that she is currently on the maximum dose, and although blood pressures have improved they are still on the lower side makes it hard to diurese her.  She states that she understands everything that is being explained to her.  Discussed that she will likely require frequent hospitalizations given the above mentioned issues.  Discussed other options including palliative care versus hospice.  Patient states she is well familiar with hospice as her father was on hospice services.  She wants to continue treatment and disease focused care.  Wishes to remain a full code.  She states she is trying to live at least another 7 years until her nephew could potentially play professional football.  I also spoke to the patient's niece Rachelle Richmond at the number on the chart.  We discussed patient's multiple comorbidities and the issues we are having with trying to diurese her and continued low blood pressure despite being maxed on midodrine.  Niece completely understands and would be in agreement with hospice services, however, she states that she will ultimately go with what Zee wants.  She did add that she will come to see her in the next day or so and that we will continue goals of care discussion.  Patient is also aware  I also spoke to the patient's caretakers at the bedside and updated them to the current treatment plan  Will continue ongoing goals of care discussion daily    VTE Pharmacologic Prophylaxis: VTE Score: 8 High Risk  (Score >/= 5) - Pharmacological DVT Prophylaxis Ordered: warfarin (Coumadin). Sequential Compression Devices Ordered.    Mobility:   Basic Mobility Inpatient Raw Score: 6  JH-HLM Goal: 2: Bed activities/Dependent transfer  JH-HLM Achieved: 2: Bed activities/Dependent transfer  JH-HLM Goal achieved. Continue to encourage appropriate mobility.    Patient Centered Rounds: I performed bedside rounds with nursing staff today.   Discussions with Specialists or Other Care Team Provider: Nursing, wound care, PT OT, case management    Education and Discussions with Family / Patient:  Updated patient's niece Rachelle Richmond at number on chart.     Current Length of Stay: 3 day(s)  Current Patient Status: Inpatient   Certification Statement: The patient will continue to require additional inpatient hospital stay due to volume overload requiring IV Lasix, close monitoring of fluid status, panniculitis on IV antibiotics  Discharge Plan:  Patient is bedbound home with caretakers at baseline.  Continues with lower extremity edema and weight above baseline.  Diuresing with IV Lasix with midodrine to support blood pressure.  Initiated goals of care discussion today, will continue daily.  At this time patient remains disease to focus wants to continue diuretic and wants to remain a full code.  Repeat labs in the AM.    Code Status: Level 1 - Full Code    Subjective   Denies dizziness, lightness, chest pain or palpitations.  Denies shortness of breath.  Denies pain or discomfort.    Objective :  Temp:  [98.4 °F (36.9 °C)-99.4 °F (37.4 °C)] 99.4 °F (37.4 °C)  HR:  [56-67] 67  BP: ()/(49-57) 112/54  Resp:  [17-20] 20  SpO2:  [96 %-97 %] 96 %  O2 Device: None (Room air)  Nasal Cannula O2 Flow Rate (L/min):  [2 L/min] 2 L/min    Body mass index is 80.14 kg/m².     Input and Output Summary (last 24 hours):     Intake/Output Summary (Last 24 hours) at 1/30/2025 1637  Last data filed at 1/30/2025 1323  Gross per 24 hour   Intake 220 ml    Output 3499 ml   Net -3279 ml       Physical Exam  Vitals reviewed.   Constitutional:       General: She is not in acute distress.     Appearance: She is well-developed. She is obese.   HENT:      Head: Normocephalic and atraumatic.   Cardiovascular:      Rate and Rhythm: Normal rate. Rhythm irregular.      Pulses: Normal pulses.      Heart sounds: Normal heart sounds. No murmur heard.  Pulmonary:      Effort: Pulmonary effort is normal. No respiratory distress.      Breath sounds: Normal breath sounds. No wheezing or rales.      Comments: Nonlabored respirations at rest on O2 2 L nasal cannula  Abdominal:      General: There is no distension.      Palpations: Abdomen is soft.      Tenderness: There is no abdominal tenderness. There is no guarding.   Musculoskeletal:         General: Swelling present.      Cervical back: No tenderness.      Right lower leg: Edema present.      Left lower leg: Edema present.   Skin:     General: Skin is warm and dry.      Capillary Refill: Capillary refill takes less than 2 seconds.   Neurological:      General: No focal deficit present.      Mental Status: She is alert and oriented to person, place, and time. Mental status is at baseline.   Psychiatric:         Mood and Affect: Mood normal.         Behavior: Behavior normal.         Thought Content: Thought content normal.         Judgment: Judgment normal.           Lines/Drains:  Lines/Drains/Airways       Active Status       Name Placement date Placement time Site Days    External Urinary Catheter 01/30/25  1324  -- less than 1                            Lab Results: I have reviewed the following results:   Results from last 7 days   Lab Units 01/30/25  0520 01/29/25  0508 01/28/25  0438   WBC Thousand/uL 10.34*   < > 8.29   HEMOGLOBIN g/dL 9.1*   < > 9.7*   HEMATOCRIT % 33.7*   < > 35.4   PLATELETS Thousands/uL 397*   < > 391*   SEGS PCT %  --   --  83*   LYMPHO PCT %  --   --  8*   MONO PCT %  --   --  5   EOS PCT %  --   --   3    < > = values in this interval not displayed.     Results from last 7 days   Lab Units 01/30/25  0520 01/27/25  0544 01/26/25  1416   SODIUM mmol/L 138   < > 137   POTASSIUM mmol/L 4.1   < > 4.0   CHLORIDE mmol/L 97   < > 96   CO2 mmol/L 36*   < > 35*   BUN mg/dL 26*   < > 24   CREATININE mg/dL 1.19   < > 1.13   ANION GAP mmol/L 5   < > 6   CALCIUM mg/dL 7.5*   < > 8.8   ALBUMIN g/dL  --   --  2.7*   TOTAL BILIRUBIN mg/dL  --   --  0.79   ALK PHOS U/L  --   --  78   ALT U/L  --   --  4*   AST U/L  --   --  12*   GLUCOSE RANDOM mg/dL 99   < > 98    < > = values in this interval not displayed.     Results from last 7 days   Lab Units 01/30/25  0520   INR  1.96*     Results from last 7 days   Lab Units 01/30/25  0804 01/28/25  0743 01/27/25  2130 01/27/25  1758 01/27/25  1756 01/27/25  1221 01/27/25  0754 01/27/25  0149   POC GLUCOSE mg/dl 98 88 107 123 246* 87 84 96         Results from last 7 days   Lab Units 01/30/25  0520 01/27/25  0544 01/26/25  1416   PROCALCITONIN ng/ml 0.08 0.06 <0.05       Recent Cultures (last 7 days):         Imaging Results Review: I reviewed radiology reports from this admission including: CT abdomen pelvis.  Other Study Results Review: No additional pertinent studies reviewed.    Last 24 Hours Medication List:     Current Facility-Administered Medications:     acetaminophen (TYLENOL) tablet 650 mg, Q4H PRN    allopurinol (ZYLOPRIM) tablet 100 mg, Daily    cefepime (MAXIPIME) 2 g/50 mL dextrose IVPB, Q12H TEODORO, Last Rate: 2,000 mg (01/30/25 0951)    docusate sodium (COLACE) capsule 100 mg, BID    furosemide (LASIX) injection 80 mg, BID (diuretic)    gabapentin (NEURONTIN) capsule 200 mg, TID    levothyroxine tablet 137 mcg, Daily    miconazole 2 % cream, BID    midodrine (PROAMATINE) tablet 10 mg, TID AC    morphine injection 2 mg, Q6H PRN    ondansetron (ZOFRAN) injection 4 mg, Q6H PRN    oxyCODONE (ROXICODONE) IR tablet 5 mg, Q6H PRN    polyethylene glycol (MIRALAX) packet 17 g, Daily  PRN    senna (SENOKOT) tablet 8.6 mg, HS    vancomycin (VANCOCIN) capsule 125 mg, Q12H TEODORO    warfarin (COUMADIN) tablet 3 mg, Daily (warfarin)    Administrative Statements   Today, Patient Was Seen By: ORACIO Last  I have spent a total time of 50 minutes in caring for this patient on the day of the visit/encounter including Diagnostic results, Patient and family education, Documenting in the medical record, Reviewing / ordering tests, medicine, procedures  , and Communicating with other healthcare professionals .    **Please Note: This note may have been constructed using a voice recognition system.**

## 2025-01-30 NOTE — ASSESSMENT & PLAN NOTE
At length discussion with the patient regarding her multiple comorbidities and frequent hospitalizations.  We discussed that it has been difficult to manage her fluid volume status as she has been hypotensive and having to hold her Lasix in the outpatient setting despite the initiation of midodrine.  She is aware that we increased her midodrine yesterday per discussion with her nephrologist and that she is currently on the maximum dose, and although blood pressures have improved they are still on the lower side makes it hard to diurese her.  She states that she understands everything that is being explained to her.  Discussed that she will likely require frequent hospitalizations given the above mentioned issues.  Discussed other options including palliative care versus hospice.  Patient states she is well familiar with hospice as her father was on hospice services.  She wants to continue treatment and disease focused care.  Wishes to remain a full code.  She states she is trying to live at least another 7 years until her nephew could potentially play professional football.  I also spoke to the patient's niece Rachelle Richmond at the number on the chart.  We discussed patient's multiple comorbidities and the issues we are having with trying to diurese her and continued low blood pressure despite being maxed on midodrine.  Niece completely understands and would be in agreement with hospice services, however, she states that she will ultimately go with what Zee wants.  She did add that she will come to see her in the next day or so and that we will continue goals of care discussion.  Patient is also aware  I also spoke to the patient's caretakers at the bedside and updated them to the current treatment plan  Will continue ongoing goals of care discussion daily

## 2025-01-31 ENCOUNTER — APPOINTMENT (INPATIENT)
Dept: CT IMAGING | Facility: HOSPITAL | Age: 75
DRG: 606 | End: 2025-01-31
Payer: MEDICARE

## 2025-01-31 PROBLEM — R41.82 CHANGE IN MENTAL STATUS: Status: ACTIVE | Noted: 2025-01-31

## 2025-01-31 LAB
ALBUMIN SERPL BCG-MCNC: 3.1 G/DL (ref 3.5–5)
ALP SERPL-CCNC: 80 U/L (ref 34–104)
ALT SERPL W P-5'-P-CCNC: 4 U/L (ref 7–52)
ANION GAP SERPL CALCULATED.3IONS-SCNC: 6 MMOL/L (ref 4–13)
ANION GAP SERPL CALCULATED.3IONS-SCNC: 6 MMOL/L (ref 4–13)
AST SERPL W P-5'-P-CCNC: 10 U/L (ref 13–39)
BACTERIA UR QL AUTO: ABNORMAL /HPF
BASE EX.OXY STD BLDV CALC-SCNC: 92.7 % (ref 60–80)
BASE EXCESS BLDA CALC-SCNC: 7.8 MMOL/L
BASE EXCESS BLDV CALC-SCNC: 8.7 MMOL/L
BILIRUB SERPL-MCNC: 0.62 MG/DL (ref 0.2–1)
BILIRUB UR QL STRIP: NEGATIVE
BUN SERPL-MCNC: 29 MG/DL (ref 5–25)
BUN SERPL-MCNC: 30 MG/DL (ref 5–25)
CALCIUM ALBUM COR SERPL-MCNC: 8.7 MG/DL (ref 8.3–10.1)
CALCIUM SERPL-MCNC: 7.8 MG/DL (ref 8.4–10.2)
CALCIUM SERPL-MCNC: 8 MG/DL (ref 8.4–10.2)
CAOX CRY URNS QL MICRO: ABNORMAL /HPF
CHLORIDE SERPL-SCNC: 95 MMOL/L (ref 96–108)
CHLORIDE SERPL-SCNC: 97 MMOL/L (ref 96–108)
CLARITY UR: ABNORMAL
CO2 SERPL-SCNC: 36 MMOL/L (ref 21–32)
CO2 SERPL-SCNC: 36 MMOL/L (ref 21–32)
COLOR UR: YELLOW
CREAT SERPL-MCNC: 1.34 MG/DL (ref 0.6–1.3)
CREAT SERPL-MCNC: 1.39 MG/DL (ref 0.6–1.3)
ERYTHROCYTE [DISTWIDTH] IN BLOOD BY AUTOMATED COUNT: 17.2 % (ref 11.6–15.1)
ERYTHROCYTE [DISTWIDTH] IN BLOOD BY AUTOMATED COUNT: 17.2 % (ref 11.6–15.1)
GFR SERPL CREATININE-BSD FRML MDRD: 37 ML/MIN/1.73SQ M
GFR SERPL CREATININE-BSD FRML MDRD: 39 ML/MIN/1.73SQ M
GLUCOSE SERPL-MCNC: 129 MG/DL (ref 65–140)
GLUCOSE SERPL-MCNC: 99 MG/DL (ref 65–140)
GLUCOSE UR STRIP-MCNC: NEGATIVE MG/DL
HCO3 BLDA-SCNC: 35.4 MMOL/L (ref 22–28)
HCO3 BLDV-SCNC: 35.6 MMOL/L (ref 24–30)
HCT VFR BLD AUTO: 33.6 % (ref 34.8–46.1)
HCT VFR BLD AUTO: 34.4 % (ref 34.8–46.1)
HGB BLD-MCNC: 9.2 G/DL (ref 11.5–15.4)
HGB BLD-MCNC: 9.5 G/DL (ref 11.5–15.4)
HGB UR QL STRIP.AUTO: ABNORMAL
INR PPP: 1.93 (ref 0.85–1.19)
INR PPP: 1.95 (ref 0.85–1.19)
KETONES UR STRIP-MCNC: NEGATIVE MG/DL
LACTATE SERPL-SCNC: 1 MMOL/L (ref 0.5–2)
LEUKOCYTE ESTERASE UR QL STRIP: ABNORMAL
MCH RBC QN AUTO: 24.7 PG (ref 26.8–34.3)
MCH RBC QN AUTO: 24.7 PG (ref 26.8–34.3)
MCHC RBC AUTO-ENTMCNC: 27.4 G/DL (ref 31.4–37.4)
MCHC RBC AUTO-ENTMCNC: 27.6 G/DL (ref 31.4–37.4)
MCV RBC AUTO: 89 FL (ref 82–98)
MCV RBC AUTO: 90 FL (ref 82–98)
NASAL CANNULA: 3
NITRITE UR QL STRIP: NEGATIVE
NON-SQ EPI CELLS URNS QL MICRO: ABNORMAL /HPF
O2 CT BLDA-SCNC: 14 ML/DL (ref 16–23)
O2 CT BLDV-SCNC: 13.5 ML/DL
OXYHGB MFR BLDA: 96.1 % (ref 94–97)
PCO2 BLDA: 68 MM HG (ref 36–44)
PCO2 BLDV: 62.9 MM HG (ref 42–50)
PH BLDA: 7.33 [PH] (ref 7.35–7.45)
PH BLDV: 7.37 [PH] (ref 7.3–7.4)
PH UR STRIP.AUTO: 5.5 [PH]
PLATELET # BLD AUTO: 385 THOUSANDS/UL (ref 149–390)
PLATELET # BLD AUTO: 415 THOUSANDS/UL (ref 149–390)
PMV BLD AUTO: 8.7 FL (ref 8.9–12.7)
PMV BLD AUTO: 8.7 FL (ref 8.9–12.7)
PO2 BLDA: 91.2 MM HG (ref 75–129)
PO2 BLDV: 71.9 MM HG (ref 35–45)
POTASSIUM SERPL-SCNC: 3.9 MMOL/L (ref 3.5–5.3)
POTASSIUM SERPL-SCNC: 4 MMOL/L (ref 3.5–5.3)
PROT SERPL-MCNC: 7.9 G/DL (ref 6.4–8.4)
PROT UR STRIP-MCNC: ABNORMAL MG/DL
PROTHROMBIN TIME: 22.5 SECONDS (ref 12.3–15)
PROTHROMBIN TIME: 22.7 SECONDS (ref 12.3–15)
RBC # BLD AUTO: 3.72 MILLION/UL (ref 3.81–5.12)
RBC # BLD AUTO: 3.85 MILLION/UL (ref 3.81–5.12)
RBC #/AREA URNS AUTO: ABNORMAL /HPF
SODIUM SERPL-SCNC: 137 MMOL/L (ref 135–147)
SODIUM SERPL-SCNC: 139 MMOL/L (ref 135–147)
SP GR UR STRIP.AUTO: 1.02
SPECIMEN SOURCE: ABNORMAL
UROBILINOGEN UR QL STRIP.AUTO: 0.2 E.U./DL
WBC # BLD AUTO: 10.85 THOUSAND/UL (ref 4.31–10.16)
WBC # BLD AUTO: 12.01 THOUSAND/UL (ref 4.31–10.16)
WBC #/AREA URNS AUTO: ABNORMAL /HPF

## 2025-01-31 PROCEDURE — 81001 URINALYSIS AUTO W/SCOPE: CPT

## 2025-01-31 PROCEDURE — 87081 CULTURE SCREEN ONLY: CPT

## 2025-01-31 PROCEDURE — 85027 COMPLETE CBC AUTOMATED: CPT

## 2025-01-31 PROCEDURE — 83605 ASSAY OF LACTIC ACID: CPT

## 2025-01-31 PROCEDURE — 82805 BLOOD GASES W/O2 SATURATION: CPT

## 2025-01-31 PROCEDURE — 80053 COMPREHEN METABOLIC PANEL: CPT

## 2025-01-31 PROCEDURE — 99233 SBSQ HOSP IP/OBS HIGH 50: CPT

## 2025-01-31 PROCEDURE — 87040 BLOOD CULTURE FOR BACTERIA: CPT

## 2025-01-31 PROCEDURE — 36600 WITHDRAWAL OF ARTERIAL BLOOD: CPT

## 2025-01-31 PROCEDURE — 80048 BASIC METABOLIC PNL TOTAL CA: CPT

## 2025-01-31 PROCEDURE — 85610 PROTHROMBIN TIME: CPT

## 2025-01-31 RX ADMIN — GABAPENTIN 200 MG: 100 CAPSULE ORAL at 22:33

## 2025-01-31 RX ADMIN — GABAPENTIN 200 MG: 100 CAPSULE ORAL at 17:37

## 2025-01-31 RX ADMIN — MICONAZOLE NITRATE: 20 CREAM TOPICAL at 10:33

## 2025-01-31 RX ADMIN — MIDODRINE HYDROCHLORIDE 10 MG: 5 TABLET ORAL at 13:01

## 2025-01-31 RX ADMIN — GABAPENTIN 200 MG: 100 CAPSULE ORAL at 10:32

## 2025-01-31 RX ADMIN — VANCOMYCIN HYDROCHLORIDE 125 MG: 125 CAPSULE ORAL at 22:31

## 2025-01-31 RX ADMIN — VANCOMYCIN HYDROCHLORIDE 125 MG: 125 CAPSULE ORAL at 10:32

## 2025-01-31 RX ADMIN — DOCUSATE SODIUM 100 MG: 100 CAPSULE, LIQUID FILLED ORAL at 17:37

## 2025-01-31 RX ADMIN — WARFARIN SODIUM 3 MG: 3 TABLET ORAL at 17:37

## 2025-01-31 RX ADMIN — MIDODRINE HYDROCHLORIDE 10 MG: 5 TABLET ORAL at 10:32

## 2025-01-31 RX ADMIN — LEVOTHYROXINE SODIUM 137 MCG: 25 TABLET ORAL at 10:32

## 2025-01-31 RX ADMIN — CEFEPIME 2000 MG: 2 INJECTION, POWDER, FOR SOLUTION INTRAVENOUS at 10:33

## 2025-01-31 RX ADMIN — MIDODRINE HYDROCHLORIDE 10 MG: 5 TABLET ORAL at 17:37

## 2025-01-31 RX ADMIN — MICONAZOLE NITRATE: 20 CREAM TOPICAL at 18:16

## 2025-01-31 RX ADMIN — ALLOPURINOL 100 MG: 100 TABLET ORAL at 10:32

## 2025-01-31 RX ADMIN — SENNOSIDES 8.6 MG: 8.6 TABLET, FILM COATED ORAL at 22:33

## 2025-01-31 NOTE — ASSESSMENT & PLAN NOTE
Lab Results   Component Value Date    HGBA1C 7.2 (H) 11/06/2024       Recent Labs     01/30/25  0804   POCGLU 98       Blood Sugar Average: Last 72 hrs:  (P) 93  Patient denies any history of diabetes.  Monitor glucose levels on BMP as needed

## 2025-01-31 NOTE — PLAN OF CARE
Problem: Prexisting or High Potential for Compromised Skin Integrity  Goal: Skin integrity is maintained or improved  Description: INTERVENTIONS:  - Identify patients at risk for skin breakdown  - Assess and monitor skin integrity  - Assess and monitor nutrition and hydration status  - Monitor labs   - Assess for incontinence   - Turn and reposition patient  - Assist with mobility/ambulation  - Relieve pressure over bony prominences  - Avoid friction and shearing  - Provide appropriate hygiene as needed including keeping skin clean and dry  - Evaluate need for skin moisturizer/barrier cream  - Collaborate with interdisciplinary team   - Patient/family teaching  - Consider wound care consult   Outcome: Progressing   Daily skin care done.

## 2025-01-31 NOTE — PROGRESS NOTES
Progress Note - Hospitalist   Name: Zee Kirk 74 y.o. female I MRN: [id number]  Unit/Bed#: -[facility] I Date of Admission: 1/26/2025   Date of Service: 1/31/2025 I Hospital Day: 4    Assessment & Plan  Panniculitis  Patient with redness that started just prior to admission, right side with drainage. Caregiver packing area w/ gauze  Images reviewed in the chart  IV Lasix  CT imaging with no obvious abscess  Patient was not candidate for panniculectomy in the past  Continue cefepime  Continue supportive care  Wound care following    History of Clostridioides difficile infection  Will continue oral vancomycin while on antibiotics  Hypothyroid  Continue levothyroxine  Class 3 severe obesity due to excess calories with serious comorbidity and body mass index (BMI) greater than or equal to 70 in adult ([facility])  BMI 79  Healthy diet and lifestyle modification recommended  Longstanding persistent atrial fibrillation ([facility])  Currently rate controlled  Coumadin for anticoagulation  Chronic heart failure with preserved ejection fraction ([facility])  Wt Readings from Last 3 Encounters:   01/30/25 (!) 205 kg (452 lb 6.1 oz)   01/17/25 (!) 205 kg (451 lb)   01/01/25 (!) 205 kg (451 lb 14.4 oz)     Patient reported she was not taking her Lasix more than once a day at home due to low blood pressures  Has been diuresing with 80 mg IV Lasix twice daily, given rising creatinine today holding for now   Continue to monitor fluid status closely  Continue midodrine blood pressure improved today  Monitor output/daily weight  Intake and output monitoring  Type 2 diabetes mellitus without complication, without long-term current use of insulin ([facility])  Lab Results   Component Value Date    HGBA1C 7.2 (H) 11/06/2024       Recent Labs     01/30/25  0804   POCGLU 98       Blood Sugar Average: Last 72 hrs:  (P) 93  Patient denies any history of diabetes.  Monitor glucose levels on BMP as needed  Goals of care, counseling/discussion  At length  discussion with the patient regarding her multiple comorbidities and frequent hospitalizations.  We discussed that it has been difficult to manage her fluid volume status as she has been hypotensive and having to hold her Lasix in the outpatient setting despite the initiation of midodrine.  She is aware that we increased her midodrine yesterday per discussion with her nephrologist and that she is currently on the maximum dose, and although blood pressures have improved they are still on the lower side makes it hard to diurese her.  She states that she understands everything that is being explained to her.  Discussed that she will likely require frequent hospitalizations given the above mentioned issues.  Discussed other options including palliative care versus hospice.  Patient states she is well familiar with hospice as her father was on hospice services.  She wants to continue treatment and disease focused care.  Wishes to remain a full code.  She states she is trying to live at least another 7 years until her nephew could potentially play professional football.  I also spoke to the patient's niece Rachelle Richmond at the number on the chart.  We discussed patient's multiple comorbidities and the issues we are having with trying to diurese her and continued low blood pressure despite being maxed on midodrine.  Niece completely understands and would be in agreement with hospice services, however, she states that she will ultimately go with what Zee wants.  She did add that she will come to see her in the next day or so and that we will continue goals of care discussion.  Patient is also aware  I also spoke to the patient's caretakers at the bedside and updated them to the current treatment plan  Spoke to patient's niece earlier who confirmed that the family would be coming tomorrow and would continue goals of care discussion.  Did make her aware that patient was starting with some new confusion today felt related  to UTI and that she is on antibiotics were monitored closely.  She is now having worsening mental status changes are going to do a CAT scan of stat labs.  See plan for change in mental status.  I attempted to reach the patient's niece Rachelle via the phone to update her on patient's worsening mental status and that she was getting a CAT scan advised that they should come in if they want to.  She did not answer so I left this message.  Change in mental status  Patient having acute mental status change today  UA positive  Continues on cefepime  Adding Vanco  Pharmacy consult  Stat head CT  Stat ABG, lactic acid, CBC, CMP, procalcitonin, PT/INR, blood cultures x 2  Continue to monitor    VTE Pharmacologic Prophylaxis: VTE Score: 8 High Risk (Score >/= 5) - Pharmacological DVT Prophylaxis Ordered: warfarin (Coumadin). Sequential Compression Devices Ordered.    Mobility:   Basic Mobility Inpatient Raw Score: 6  JH-HLM Goal: 2: Bed activities/Dependent transfer  JH-HLM Achieved: 2: Bed activities/Dependent transfer  JH-HLM Goal achieved. Continue to encourage appropriate mobility.    Patient Centered Rounds: I performed bedside rounds with nursing staff today.   Discussions with Specialists or Other Care Team Provider: Nursing, wound care, PT OT, case management    Education and Discussions with Family / Patient:  Updated patient's niece Rachelle Richmond at number on chart.     Current Length of Stay: 4 day(s)  Current Patient Status: Inpatient   Certification Statement: The patient will continue to require additional inpatient hospital stay due to continued IV antibiotics for panniculitis, also now with UTI.  Worsening mental status changes to the day requiring stat labs, ABG, CT head  Discharge Plan:  Patient is bedbound home with caretakers at baseline.  She is having worsening mental status change today, obtaining stat head CT, stat labs, ABG, and will continue the IV antibiotics for her panniculitis and now UTI.  Added  "vancomycin.  Pharmacy consulted.  Ongoing goals of care discussion at this point patient is still a full code.  .    Code Status: Level 1 - Full Code    Subjective   Patient having worsening mental status changes today, worse as the day is progressing.  She denied pain when asked.  She states \"I feeling I am dying\"      Objective :  Temp:  [98.9 °F (37.2 °C)] 98.9 °F (37.2 °C)  HR:  [56-71] 57  BP: (102-108)/(49-90) 108/90  Resp:  [17-20] 17  SpO2:  [94 %-97 %] 97 %  O2 Device: Nasal cannula  Nasal Cannula O2 Flow Rate (L/min):  [2 L/min] 2 L/min    Body mass index is 80.14 kg/m².     Input and Output Summary (last 24 hours):     Intake/Output Summary (Last 24 hours) at 1/31/2025 1845  Last data filed at 1/31/2025 1700  Gross per 24 hour   Intake 1104 ml   Output 550 ml   Net 554 ml       Physical Exam  Vitals reviewed.   Constitutional:       General: She is not in acute distress.     Appearance: She is well-developed. She is obese.   HENT:      Head: Normocephalic and atraumatic.   Cardiovascular:      Rate and Rhythm: Normal rate. Rhythm irregular.      Pulses: Normal pulses.      Heart sounds: Normal heart sounds. No murmur heard.  Pulmonary:      Effort: Pulmonary effort is normal. No respiratory distress.      Breath sounds: Normal breath sounds. No wheezing or rales.      Comments: Nonlabored respirations at rest on O2 2 L nasal cannula  Abdominal:      General: There is no distension.      Palpations: Abdomen is soft.      Tenderness: There is no abdominal tenderness. There is no guarding.   Musculoskeletal:         General: Swelling present.      Cervical back: No tenderness.      Right lower leg: Edema present.      Left lower leg: Edema present.   Skin:     General: Skin is warm and dry.      Capillary Refill: Capillary refill takes less than 2 seconds.   Neurological:      General: No focal deficit present.      Mental Status: She is alert and oriented to person, place, and time.   Psychiatric:         " Mood and Affect: Mood normal.         Behavior: Behavior normal.         Thought Content: Thought content normal.         Judgment: Judgment normal.           Lines/Drains:  Lines/Drains/Airways       Active Status       Name Placement date Placement time Site Days    External Urinary Catheter 01/30/25  1324  -- 1                            Lab Results: I have reviewed the following results:   Results from last 7 days   Lab Units 01/31/25  1812 01/29/25  0508 01/28/25  0438   WBC Thousand/uL 12.01*   < > 8.29   HEMOGLOBIN g/dL 9.5*   < > 9.7*   HEMATOCRIT % 34.4*   < > 35.4   PLATELETS Thousands/uL 385   < > 391*   SEGS PCT %  --   --  83*   LYMPHO PCT %  --   --  8*   MONO PCT %  --   --  5   EOS PCT %  --   --  3    < > = values in this interval not displayed.     Results from last 7 days   Lab Units 01/31/25  1812   SODIUM mmol/L 137   POTASSIUM mmol/L 3.9   CHLORIDE mmol/L 95*   CO2 mmol/L 36*   BUN mg/dL 30*   CREATININE mg/dL 1.39*   ANION GAP mmol/L 6   CALCIUM mg/dL 8.0*   ALBUMIN g/dL 3.1*   TOTAL BILIRUBIN mg/dL 0.62   ALK PHOS U/L 80   ALT U/L 4*   AST U/L 10*   GLUCOSE RANDOM mg/dL 129     Results from last 7 days   Lab Units 01/31/25  0455   INR  1.93*     Results from last 7 days   Lab Units 01/30/25  0804 01/28/25  0743 01/27/25  2130 01/27/25  1758 01/27/25  1756 01/27/25  1221 01/27/25  0754 01/27/25  0149   POC GLUCOSE mg/dl 98 88 107 123 246* 87 84 96         Results from last 7 days   Lab Units 01/31/25  1812 01/30/25  0520 01/27/25  0544 01/26/25  1416   LACTIC ACID mmol/L 1.0  --   --   --    PROCALCITONIN ng/ml  --  0.08 0.06 <0.05       Recent Cultures (last 7 days):         Imaging Results Review: I reviewed radiology reports from this admission including: CT abdomen pelvis.  Other Study Results Review: No additional pertinent studies reviewed.    Last 24 Hours Medication List:     Current Facility-Administered Medications:     acetaminophen (TYLENOL) tablet 650 mg, Q4H PRN    allopurinol  (ZYLOPRIM) tablet 100 mg, Daily    cefepime (MAXIPIME) 2 g/50 mL dextrose IVPB, Q12H TEODORO, Last Rate: 2,000 mg (01/31/25 1033)    docusate sodium (COLACE) capsule 100 mg, BID    gabapentin (NEURONTIN) capsule 200 mg, TID    levothyroxine tablet 137 mcg, Daily    miconazole 2 % cream, BID    midodrine (PROAMATINE) tablet 10 mg, TID AC    morphine injection 2 mg, Q6H PRN    ondansetron (ZOFRAN) injection 4 mg, Q6H PRN    oxyCODONE (ROXICODONE) IR tablet 5 mg, Q6H PRN    polyethylene glycol (MIRALAX) packet 17 g, Daily PRN    senna (SENOKOT) tablet 8.6 mg, HS    [START ON 2/1/2025] vancomycin (VANCOCIN) 1500 mg in sodium chloride 0.9% 250 mL IVPB, Q24H    vancomycin (VANCOCIN) 2,000 mg in sodium chloride 0.9 % 500 mL IVPB, Once, Last Rate: 2,000 mg (01/31/25 1825)    vancomycin (VANCOCIN) capsule 125 mg, Q12H TEODORO    warfarin (COUMADIN) tablet 3 mg, Daily (warfarin)    Administrative Statements   Today, Patient Was Seen By: ORACIO Last  I have spent a total time of 50 minutes in caring for this patient on the day of the visit/encounter including Diagnostic results, Patient and family education, Documenting in the medical record, Reviewing / ordering tests, medicine, procedures  , and Communicating with other healthcare professionals .    **Please Note: This note may have been constructed using a voice recognition system.**

## 2025-01-31 NOTE — ASSESSMENT & PLAN NOTE
Wt Readings from Last 3 Encounters:   01/30/25 (!) 205 kg (452 lb 6.1 oz)   01/17/25 (!) 205 kg (451 lb)   01/01/25 (!) 205 kg (451 lb 14.4 oz)     Patient reported she was not taking her Lasix more than once a day at home due to low blood pressures  Has been diuresing with 80 mg IV Lasix twice daily, given rising creatinine today holding for now   Continue to monitor fluid status closely  Continue midodrine blood pressure improved today  Monitor output/daily weight  Intake and output monitoring

## 2025-01-31 NOTE — ASSESSMENT & PLAN NOTE
Patient having acute mental status change today  UA positive  Continues on cefepime  Adding Vanco  Pharmacy consult  Stat head CT  Stat ABG, lactic acid, CBC, CMP, procalcitonin, PT/INR, blood cultures x 2  Continue to monitor

## 2025-01-31 NOTE — PLAN OF CARE
Problem: Prexisting or High Potential for Compromised Skin Integrity  Goal: Skin integrity is maintained or improved  Description: INTERVENTIONS:  - Identify patients at risk for skin breakdown  - Assess and monitor skin integrity  - Assess and monitor nutrition and hydration status  - Monitor labs   - Assess for incontinence   - Turn and reposition patient  - Assist with mobility/ambulation  - Relieve pressure over bony prominences  - Avoid friction and shearing  - Provide appropriate hygiene as needed including keeping skin clean and dry  - Evaluate need for skin moisturizer/barrier cream  - Collaborate with interdisciplinary team   - Patient/family teaching  - Consider wound care consult   Outcome: Progressing     Problem: PAIN - ADULT  Goal: Verbalizes/displays adequate comfort level or baseline comfort level  Description: Interventions:  - Encourage patient to monitor pain and request assistance  - Assess pain using appropriate pain scale  - Administer analgesics based on type and severity of pain and evaluate response  - Implement non-pharmacological measures as appropriate and evaluate response  - Consider cultural and social influences on pain and pain management  - Notify physician/advanced practitioner if interventions unsuccessful or patient reports new pain  Outcome: Progressing     Problem: INFECTION - ADULT  Goal: Absence or prevention of progression during hospitalization  Description: INTERVENTIONS:  - Assess and monitor for signs and symptoms of infection  - Monitor lab/diagnostic results  - Monitor all insertion sites, i.e. indwelling lines, tubes, and drains  - Monitor endotracheal if appropriate and nasal secretions for changes in amount and color  - Randall appropriate cooling/warming therapies per order  - Administer medications as ordered  - Instruct and encourage patient and family to use good hand hygiene technique  - Identify and instruct in appropriate isolation precautions for  identified infection/condition  Outcome: Progressing  Goal: Absence of fever/infection during neutropenic period  Description: INTERVENTIONS:  - Monitor WBC    Outcome: Progressing     Problem: SAFETY ADULT  Goal: Patient will remain free of falls  Description: INTERVENTIONS:  - Educate patient/family on patient safety including physical limitations  - Instruct patient to call for assistance with activity   - Consult OT/PT to assist with strengthening/mobility   - Keep Call bell within reach  - Keep bed low and locked with side rails adjusted as appropriate  - Keep care items and personal belongings within reach  - Initiate and maintain comfort rounds  - Make Fall Risk Sign visible to staff  - Offer Toileting every 2 Hours, in advance of need  - Initiate/Maintain bed alarm  - Obtain necessary fall risk management equipment: yellow socks  - Apply yellow socks and bracelet for high fall risk patients  - Consider moving patient to room near nurses station  Outcome: Progressing  Goal: Maintain or return to baseline ADL function  Description: INTERVENTIONS:  -  Assess patient's ability to carry out ADLs; assess patient's baseline for ADL function and identify physical deficits which impact ability to perform ADLs (bathing, care of mouth/teeth, toileting, grooming, dressing, etc.)  - Assess/evaluate cause of self-care deficits   - Assess range of motion  - Assess patient's mobility; develop plan if impaired  - Assess patient's need for assistive devices and provide as appropriate  - Encourage maximum independence but intervene and supervise when necessary  - Involve family in performance of ADLs  - Assess for home care needs following discharge   - Consider OT consult to assist with ADL evaluation and planning for discharge  - Provide patient education as appropriate  Outcome: Progressing  Goal: Maintains/Returns to pre admission functional level  Description: INTERVENTIONS:  - Perform AM-PAC 6 Click Basic Mobility/ Daily  Activity assessment daily.  - Set and communicate daily mobility goal to care team and patient/family/caregiver.   - Collaborate with rehabilitation services on mobility goals if consulted  - Perform Range of Motion 3 times a day.  - Reposition patient every 2 hours.  - Dangle patient 3 times a day  - Stand patient 3 times a day  - Ambulate patient 3 times a day  - Out of bed to chair 3 times a day   - Out of bed for meals 3 times a day  - Out of bed for toileting  - Record patient progress and toleration of activity level   Outcome: Progressing     Problem: DISCHARGE PLANNING  Goal: Discharge to home or other facility with appropriate resources  Description: INTERVENTIONS:  - Identify barriers to discharge w/patient and caregiver  - Arrange for needed discharge resources and transportation as appropriate  - Identify discharge learning needs (meds, wound care, etc.)  - Arrange for interpretive services to assist at discharge as needed  - Refer to Case Management Department for coordinating discharge planning if the patient needs post-hospital services based on physician/advanced practitioner order or complex needs related to functional status, cognitive ability, or social support system  Outcome: Progressing     Problem: Knowledge Deficit  Goal: Patient/family/caregiver demonstrates understanding of disease process, treatment plan, medications, and discharge instructions  Description: Complete learning assessment and assess knowledge base.  Interventions:  - Provide teaching at level of understanding  - Provide teaching via preferred learning methods  Outcome: Progressing     Problem: GASTROINTESTINAL - ADULT  Goal: Minimal or absence of nausea and/or vomiting  Description: INTERVENTIONS:  - Administer IV fluids if ordered to ensure adequate hydration  - Maintain NPO status until nausea and vomiting are resolved  - Nasogastric tube if ordered  - Administer ordered antiemetic medications as needed  - Provide  nonpharmacologic comfort measures as appropriate  - Advance diet as tolerated, if ordered  - Consider nutrition services referral to assist patient with adequate nutrition and appropriate food choices  Outcome: Progressing  Goal: Maintains or returns to baseline bowel function  Description: INTERVENTIONS:  - Assess bowel function  - Encourage oral fluids to ensure adequate hydration  - Administer IV fluids if ordered to ensure adequate hydration  - Administer ordered medications as needed  - Encourage mobilization and activity  - Consider nutritional services referral to assist patient with adequate nutrition and appropriate food choices  Outcome: Progressing  Goal: Maintains adequate nutritional intake  Description: INTERVENTIONS:  - Monitor percentage of each meal consumed  - Identify factors contributing to decreased intake, treat as appropriate  - Assist with meals as needed  - Monitor I&O, weight, and lab values if indicated  - Obtain nutrition services referral as needed  Outcome: Progressing  Goal: Establish and maintain optimal ostomy function  Description: INTERVENTIONS:  - Assess bowel function  - Encourage oral fluids to ensure adequate hydration  - Administer IV fluids if ordered to ensure adequate hydration   - Administer ordered medications as needed  - Encourage mobilization and activity  - Nutrition services referral to assist patient with appropriate food choices  - Assess stoma site  - Consider wound care consult   Outcome: Progressing  Goal: Oral mucous membranes remain intact  Description: INTERVENTIONS  - Assess oral mucosa and hygiene practices  - Implement preventative oral hygiene regimen  - Implement oral medicated treatments as ordered  - Initiate Nutrition services referral as needed  Outcome: Progressing     Problem: Nutrition/Hydration-ADULT  Goal: Nutrient/Hydration intake appropriate for improving, restoring or maintaining nutritional needs  Description: Monitor and assess patient's  nutrition/hydration status for malnutrition. Collaborate with interdisciplinary team and initiate plan and interventions as ordered.  Monitor patient's weight and dietary intake as ordered or per policy. Utilize nutrition screening tool and intervene as necessary. Determine patient's food preferences and provide high-protein, high-caloric foods as appropriate.     INTERVENTIONS:  - Monitor oral intake, urinary output, labs, and treatment plans  - Assess nutrition and hydration status and recommend course of action  - Evaluate amount of meals eaten  - Assist patient with eating if necessary   - Allow adequate time for meals  - Recommend/ encourage appropriate diets, oral nutritional supplements, and vitamin/mineral supplements  - Order, calculate, and assess calorie counts as needed  - Recommend, monitor, and adjust tube feedings and TPN/PPN based on assessed needs  - Assess need for intravenous fluids  - Provide specific nutrition/hydration education as appropriate  - Include patient/family/caregiver in decisions related to nutrition  Outcome: Progressing

## 2025-01-31 NOTE — ASSESSMENT & PLAN NOTE
At length discussion with the patient regarding her multiple comorbidities and frequent hospitalizations.  We discussed that it has been difficult to manage her fluid volume status as she has been hypotensive and having to hold her Lasix in the outpatient setting despite the initiation of midodrine.  She is aware that we increased her midodrine yesterday per discussion with her nephrologist and that she is currently on the maximum dose, and although blood pressures have improved they are still on the lower side makes it hard to diurese her.  She states that she understands everything that is being explained to her.  Discussed that she will likely require frequent hospitalizations given the above mentioned issues.  Discussed other options including palliative care versus hospice.  Patient states she is well familiar with hospice as her father was on hospice services.  She wants to continue treatment and disease focused care.  Wishes to remain a full code.  She states she is trying to live at least another 7 years until her nephew could potentially play professional football.  I also spoke to the patient's niece Rachelle Richmond at the number on the chart.  We discussed patient's multiple comorbidities and the issues we are having with trying to diurese her and continued low blood pressure despite being maxed on midodrine.  Niece completely understands and would be in agreement with hospice services, however, she states that she will ultimately go with what Zee wants.  She did add that she will come to see her in the next day or so and that we will continue goals of care discussion.  Patient is also aware  I also spoke to the patient's caretakers at the bedside and updated them to the current treatment plan  Spoke to patient's niece earlier who confirmed that the family would be coming tomorrow and would continue goals of care discussion.  Did make her aware that patient was starting with some new confusion today  felt related to UTI and that she is on antibiotics were monitored closely.  She is now having worsening mental status changes are going to do a CAT scan of stat labs.  See plan for change in mental status.  I attempted to reach the patient's niece Rachelle via the phone to update her on patient's worsening mental status and that she was getting a CAT scan advised that they should come in if they want to.  She did not answer so I left this message.

## 2025-02-01 ENCOUNTER — HOSPITAL ENCOUNTER (OUTPATIENT)
Dept: RADIOLOGY | Facility: HOSPITAL | Age: 75
End: 2025-02-01
Payer: MEDICARE

## 2025-02-01 ENCOUNTER — HOME CARE VISIT (OUTPATIENT)
Dept: HOME HEALTH SERVICES | Facility: HOME HEALTHCARE | Age: 75
End: 2025-02-01

## 2025-02-01 ENCOUNTER — HOSPITAL ENCOUNTER (OUTPATIENT)
Dept: RADIOLOGY | Facility: HOSPITAL | Age: 75
Discharge: HOME | End: 2025-02-01
Payer: MEDICARE

## 2025-02-01 PROBLEM — N30.00 ACUTE CYSTITIS WITHOUT HEMATURIA: Status: ACTIVE | Noted: 2025-02-01

## 2025-02-01 LAB
ANION GAP SERPL CALCULATED.3IONS-SCNC: 8 MMOL/L (ref 4–13)
BASOPHILS # BLD AUTO: 0.06 THOUSANDS/ÂΜL (ref 0–0.1)
BASOPHILS NFR BLD AUTO: 1 % (ref 0–1)
BUN SERPL-MCNC: 31 MG/DL (ref 5–25)
CALCIUM SERPL-MCNC: 7.6 MG/DL (ref 8.4–10.2)
CHLORIDE SERPL-SCNC: 97 MMOL/L (ref 96–108)
CO2 SERPL-SCNC: 33 MMOL/L (ref 21–32)
CREAT SERPL-MCNC: 1.33 MG/DL (ref 0.6–1.3)
EOSINOPHIL # BLD AUTO: 0.37 THOUSAND/ÂΜL (ref 0–0.61)
EOSINOPHIL NFR BLD AUTO: 3 % (ref 0–6)
ERYTHROCYTE [DISTWIDTH] IN BLOOD BY AUTOMATED COUNT: 17.2 % (ref 11.6–15.1)
GFR SERPL CREATININE-BSD FRML MDRD: 39 ML/MIN/1.73SQ M
GLUCOSE SERPL-MCNC: 98 MG/DL (ref 65–140)
HCT VFR BLD AUTO: 33.4 % (ref 34.8–46.1)
HGB BLD-MCNC: 8.9 G/DL (ref 11.5–15.4)
IMM GRANULOCYTES # BLD AUTO: 0.08 THOUSAND/UL (ref 0–0.2)
IMM GRANULOCYTES NFR BLD AUTO: 1 % (ref 0–2)
LYMPHOCYTES # BLD AUTO: 1.18 THOUSANDS/ÂΜL (ref 0.6–4.47)
LYMPHOCYTES NFR BLD AUTO: 10 % (ref 14–44)
MCH RBC QN AUTO: 23.9 PG (ref 26.8–34.3)
MCHC RBC AUTO-ENTMCNC: 26.6 G/DL (ref 31.4–37.4)
MCV RBC AUTO: 90 FL (ref 82–98)
MONOCYTES # BLD AUTO: 0.69 THOUSAND/ÂΜL (ref 0.17–1.22)
MONOCYTES NFR BLD AUTO: 6 % (ref 4–12)
NEUTROPHILS # BLD AUTO: 9.54 THOUSANDS/ÂΜL (ref 1.85–7.62)
NEUTS SEG NFR BLD AUTO: 79 % (ref 43–75)
NRBC BLD AUTO-RTO: 0 /100 WBCS
PLATELET # BLD AUTO: 379 THOUSANDS/UL (ref 149–390)
PMV BLD AUTO: 8.7 FL (ref 8.9–12.7)
POTASSIUM SERPL-SCNC: 3.7 MMOL/L (ref 3.5–5.3)
RBC # BLD AUTO: 3.72 MILLION/UL (ref 3.81–5.12)
SODIUM SERPL-SCNC: 138 MMOL/L (ref 135–147)
WBC # BLD AUTO: 11.92 THOUSAND/UL (ref 4.31–10.16)

## 2025-02-01 PROCEDURE — 99233 SBSQ HOSP IP/OBS HIGH 50: CPT

## 2025-02-01 PROCEDURE — 70450 CT HEAD/BRAIN W/O DYE: CPT

## 2025-02-01 PROCEDURE — 80048 BASIC METABOLIC PNL TOTAL CA: CPT

## 2025-02-01 PROCEDURE — 85025 COMPLETE CBC W/AUTO DIFF WBC: CPT

## 2025-02-01 RX ORDER — WARFARIN SODIUM 2 MG/1
4 TABLET ORAL
Status: DISCONTINUED | OUTPATIENT
Start: 2025-02-01 | End: 2025-02-06

## 2025-02-01 RX ADMIN — GABAPENTIN 200 MG: 100 CAPSULE ORAL at 21:31

## 2025-02-01 RX ADMIN — ALLOPURINOL 100 MG: 100 TABLET ORAL at 09:02

## 2025-02-01 RX ADMIN — GABAPENTIN 200 MG: 100 CAPSULE ORAL at 18:00

## 2025-02-01 RX ADMIN — MIDODRINE HYDROCHLORIDE 10 MG: 5 TABLET ORAL at 12:23

## 2025-02-01 RX ADMIN — MIDODRINE HYDROCHLORIDE 10 MG: 5 TABLET ORAL at 18:00

## 2025-02-01 RX ADMIN — GABAPENTIN 200 MG: 100 CAPSULE ORAL at 09:01

## 2025-02-01 RX ADMIN — VANCOMYCIN HYDROCHLORIDE 125 MG: 125 CAPSULE ORAL at 09:05

## 2025-02-01 RX ADMIN — VANCOMYCIN HYDROCHLORIDE 1250 MG: 1 INJECTION, POWDER, LYOPHILIZED, FOR SOLUTION INTRAVENOUS at 18:41

## 2025-02-01 RX ADMIN — MICONAZOLE NITRATE: 20 CREAM TOPICAL at 18:00

## 2025-02-01 RX ADMIN — VANCOMYCIN HYDROCHLORIDE 2000 MG: 1 INJECTION, POWDER, LYOPHILIZED, FOR SOLUTION INTRAVENOUS at 06:32

## 2025-02-01 RX ADMIN — FUROSEMIDE 60 MG: 20 TABLET ORAL at 18:00

## 2025-02-01 RX ADMIN — CEFEPIME 2000 MG: 2 INJECTION, POWDER, FOR SOLUTION INTRAVENOUS at 02:00

## 2025-02-01 RX ADMIN — WARFARIN SODIUM 4 MG: 2 TABLET ORAL at 18:00

## 2025-02-01 RX ADMIN — MICONAZOLE NITRATE: 20 CREAM TOPICAL at 09:05

## 2025-02-01 RX ADMIN — DOCUSATE SODIUM 100 MG: 100 CAPSULE, LIQUID FILLED ORAL at 09:02

## 2025-02-01 RX ADMIN — LEVOTHYROXINE SODIUM 137 MCG: 25 TABLET ORAL at 09:02

## 2025-02-01 RX ADMIN — DOCUSATE SODIUM 100 MG: 100 CAPSULE, LIQUID FILLED ORAL at 18:00

## 2025-02-01 RX ADMIN — CEFEPIME 2000 MG: 2 INJECTION, POWDER, FOR SOLUTION INTRAVENOUS at 09:01

## 2025-02-01 RX ADMIN — CEFEPIME 2000 MG: 2 INJECTION, POWDER, FOR SOLUTION INTRAVENOUS at 21:48

## 2025-02-01 RX ADMIN — MIDODRINE HYDROCHLORIDE 10 MG: 5 TABLET ORAL at 06:28

## 2025-02-01 NOTE — HOSPICE NOTE
"Received referral, as per EPIC notes \" Patient wants to remain full code and continue treatments, Yuliana completely understands and would be in agreement with hospice services, however, she states that she will ultimately go with what Zee wants. Received messaged from [facility] Abby Esc[facility] woud[facility] like to discuss hospice. Called and LM for Robbie at [phone] and also Rachelle at [phone]. [facility] updated, will contineu to follow up if family and patient request.  "

## 2025-02-01 NOTE — ASSESSMENT & PLAN NOTE
Lab Results   Component Value Date    HGBA1C 7.2 (H) 11/06/2024       Recent Labs     01/30/25  0804   POCGLU 98       Blood Sugar Average: Last 72 hrs:  (P) 98  Patient denies any history of diabetes.  Monitor glucose levels on BMP as needed

## 2025-02-01 NOTE — CASE MANAGEMENT
Case Management Discharge Planning Note    Patient name Zee Kirk  Location /-01 MRN [id number]  : 1950 Date 2025       Current Admission Date: 2025  Current Admission Diagnosis:Panniculitis   Patient Active Problem List    Diagnosis Date Noted Date Diagnosed    Change in mental status 2025     Goals of care, counseling/discussion 2025     Bilateral lower extremity edema 2024     Hypotension 2024     Weakness 2024     Paroxysmal A-fib ([facility]) 2024     History of kidney stones 2024     Skin abnormalities 2024     Cataract, nuclear sclerotic senile, right 2024     Chronic heart failure with preserved ejection fraction ([facility]) 2024     Hypoxic episode 2024     Type 2 diabetes mellitus without complication, without long-term current use of insulin ([facility]) 2024     Long term (current) use of anticoagulants 2024     Subtherapeutic international normalized ratio (INR) 2024     Multiple open wounds 2024     Left leg pain 2023     Shingles 2023     Cellulitis- Ruled Out 2023     Longstanding persistent atrial fibrillation ([facility]) 2022     History of Clostridioides difficile infection 2022     Hypothyroid 10/03/2020     Mild episode of recurrent major depressive disorder ([facility]) 10/03/2020     Idiopathic chronic 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 10/03/2020     Class 3 severe obesity due to excess calories with serious comorbidity and body mass index (BMI) greater than or equal to 70 in adult ([facility]) 10/03/2020     Panniculitis 10/03/2020     Gastroesophageal reflux disease without esophagitis 10/03/2020     Hx MRSA infection 2020     Impaired mobility 2019     Osteoarthritis of glenohumeral joint 2019     Left ovarian cyst 2017     Depression with anxiety 07/15/2017     Anemia 2016     Adjustment disorder 2013     Irritable bowel  syndrome 12/04/2012     Left atrial enlargement 12/04/2012     Left ventricular hypertrophy 12/04/2012     Carpal tunnel syndrome 05/30/2012     Gout 05/30/2012     Hyperlipidemia 05/30/2012     Symptomatic menopausal or female climacteric states 05/30/2012       LOS (days): 4  Geometric Mean LOS (GMLOS) (days): 3.1  Days to GMLOS:-1.1     OBJECTIVE:  Risk of Unplanned Readmission Score: 43.16         Current admission status: Inpatient   Preferred Pharmacy:   [facility]  [place] [address]  Phone: [phone] Fax: [phone]    Primary Care Provider: Franklyn Caruso MD    Primary Insurance: MEDICARE  Secondary Insurance: [facility]    DISCHARGE DETAILS:       Freedom of Choice: Yes  Comments - Freedom of Choice: goals of care discussion were started - family is coming in tomorrow to continue this discussion - cm will continue to follow - cm did call [facility] [phone] @15:20pm and spoke to Jose- cm made her aware that irene pt is not going to be d/c tomorroe - they need 24 hr notice of the d/c

## 2025-02-01 NOTE — ASSESSMENT & PLAN NOTE
POA  Patient with redness that started just prior to admission, right side with drainage. Caregiver packing area w/ gauze  Images reviewed in the chart  CT imaging with no obvious abscess  Patient was not candidate for panniculectomy in the past  Continue cefepime  Continue supportive care  Wound care following

## 2025-02-01 NOTE — PROGRESS NOTES
Patient:  GIANCARLO SCHMID    MRN:  [id number]    Aidin Request ID:  [id number]    Level of care reserved:  Hospice    Partner Reserved:  [facility], [place] [address] [phone]    Clinical needs requested:    Geography searched:  [phone]    Start of Service:    Request sent:  12:21pm EST on 2/1/2025 by Abby Escobar    Partner reserved:  1:03pm EST on 2/1/2025 by Abby Escobar    Choice list shared:  1:02pm EST on 2/1/2025 by Abby Escobar

## 2025-02-01 NOTE — PROGRESS NOTES
Zee Kirk is a 74 y.o. female who is currently ordered Vancomycin IV with management by the Pharmacy Consult service.  Relevant clinical data and objective / subjective history reviewed.  Vancomycin Assessment:  Indication and Goal AUC/Trough: Urinary tract infection (goal -600, trough >10)  Clinical Status: stable  Micro:     Renal Function:  SCr: 1.33 mg/dL  CrCl: 67 mL/min  Renal replacement: Not on dialysis  Days of Therapy: 2  Current Dose: Loadig dose  Vancomycin Plan:  New Dosinmg IV q24h  Estimated AUC: 515 mcg*hr/mL  Estimated Trough: 15.8 mcg/mL  Next Level: 2/3 AM labs  Renal Function Monitoring: Daily BMP and UOP  Pharmacy will continue to follow closely for s/sx of nephrotoxicity, infusion reactions and appropriateness of therapy.  BMP and CBC will be ordered per protocol. We will continue to follow the patient’s culture results and clinical progress daily.    Kevin Hicks, Pharmacist

## 2025-02-01 NOTE — ASSESSMENT & PLAN NOTE
Patient having acute mental status changes 1/31  UA positive  Continues on cefepime (was treating for panniculitis)  Vancomycin added 1/3 will continue  MRSA swab pending result  Pharmacy following  Transferred to [facility] 1/13 for stat head CT-negative for acute intracranial abnormality  Mental status has greatly improved today, only occasional confusion  Per discussion with nephew at bedside patient had confusion with Oxy in the past.   Continue to monitor, reorient as needed

## 2025-02-01 NOTE — PROGRESS NOTES
Progress Note - Hospitalist   Name: Zee Kirk 74 y.o. female I MRN: [id number]  Unit/Bed#: -01 I Date of Admission: 1/26/2025   Date of Service: 2/1/2025 I Hospital Day: 5    Assessment & Plan  Panniculitis  POA  Patient with redness that started just prior to admission, right side with drainage. Caregiver packing area w/ gauze  Images reviewed in the chart  CT imaging with no obvious abscess  Patient was not candidate for panniculectomy in the past  Continue cefepime  Continue supportive care  Wound care following    History of Clostridioides difficile infection  Will continue oral vancomycin while on antibiotics  Hypothyroid  Continue levothyroxine  Class 3 severe obesity due to excess calories with serious comorbidity and body mass index (BMI) greater than or equal to 70 in adult ([facility])  BMI 79  Healthy diet and lifestyle modification recommended  Longstanding persistent atrial fibrillation ([facility])  Currently rate controlled  Coumadin for anticoagulation  Check INR daily  Chronic heart failure with preserved ejection fraction ([facility])  Wt Readings from Last 3 Encounters:   01/30/25 (!) 205 kg (452 lb 6.1 oz)   01/17/25 (!) 205 kg (451 lb)   01/01/25 (!) 205 kg (451 lb 14.4 oz)   11/7/2024 TTE: LVEF 60% with normal systolic function  Outpatient regimen includes furosemide 60 mg twice daily   Patient reported she was only taking Lasix in the a.m. at home due to low blood pressures despite midodrine 5 mg 3 times daily outpatient  Initially diuresing with IV Lasix   Discussed with nephrology increased midodrine to 10 mg 3 times a day to help support blood pressure for diuresis  Patient still having intermittent hypotension  Will continue home Lasix regimen with hold parameters  Continue to monitor fluid status closely  Daily weight  Intake and output monitoring  Type 2 diabetes mellitus without complication, without long-term current use of insulin ([facility])  Lab Results   Component Value Date    HGBA1C 7.2  (H) 11/06/2024       Recent Labs     01/30/25  0804   POCGLU 98       Blood Sugar Average: Last 72 hrs:  (P) 98  Patient denies any history of diabetes.  Monitor glucose levels on BMP as needed  Goals of care, counseling/discussion  Goals of care discussion initiated with the patient 1/30 and have been continuing on a daily basis  2/1 had extensive discussion with patient in the presence of her nephew Robbie, niece Rachelle, patient's sister, and their children regarding patient's current medical comorbidities and goals of care.  We discussed the fact that patient has been having difficulty at home managing her lymphedema and becoming volume overloaded due to hypotension and not being able to take Lasix as described despite being on midodrine 3 times a day.  We also discussed her other comorbidities including CHF, and her atrial fibrillation, bedbound status, etc. we talked about patient having more frequent hospitalizations regarding being volume overloaded and hypotensive and having trouble managing her diuretics.  Yesterday I introduced the concepts of palliative care and hospice and patient expressed understanding but wanted to discuss with her family.  We again discussed these options today.  Patient expressed that she is not ready to die yet, although she does understand the current situation that she is in.  She was agreeable to a hospice consult which was placed  Hospice liaison called and spoke to family.  Patient's niece who works in healthcare completely understands patient's current health status and would be in agreement with hospice, however she is going to support what ever her aunt wants.  Patient is declining hospice at this time  Will continue treatment focused care, full code.  Patient wants to be medically stabilized and discharged back home with her caretakers  Case management following and aware  Change in mental status  Patient having acute mental status changes 1/31  UA positive  Continues on  cefepime (was treating for panniculitis)  Vancomycin added 1/3 will continue  MRSA swab pending result  Pharmacy following  Transferred to [facility] 1/13 for stat head CT-negative for acute intracranial abnormality  Mental status has greatly improved today, only occasional confusion  Per discussion with nephew at bedside patient had confusion with Oxy in the past.   Continue to monitor, reorient as needed  Acute cystitis without hematuria  UA positive  Acute mental status changes started 1/31 see plan for same  Patient has numerous antibiotic allergies  Continue antibiotic treatment with cefepime and vancomycin  No urinary complaints today  Monitor urinary output    VTE Pharmacologic Prophylaxis: VTE Score: 8 High Risk (Score >/= 5) - Pharmacological DVT Prophylaxis Ordered: warfarin (Coumadin). Sequential Compression Devices Ordered.    Mobility:   Basic Mobility Inpatient Raw Score: 6  JH-HLM Goal: 2: Bed activities/Dependent transfer  JH-HLM Achieved: 2: Bed activities/Dependent transfer  JH-HLM Goal achieved. Continue to encourage appropriate mobility.    Patient Centered Rounds: I performed bedside rounds with nursing staff today.   Discussions with Specialists or Other Care Team Provider: Nursing, hospice, wound care, PT OT, case management    Education and Discussions with Family / Patient: Updated  (sister, nephew, niece, and and multiple other family members) at bedside.    Current Length of Stay: 5 day(s)  Current Patient Status: Inpatient   Certification Statement: The patient will continue to require additional inpatient hospital stay due to continued IV antibiotics for panniculitis/uti, also now with UTI.   Ongoing goals of care discussion.     Discharge Plan: Extensive goals of care discussion with patient and numerous family members at the bedside today.  Patient was agreeable to a hospice consult, he is on spoke with patient and family and patient declined hospice services.  Wants  to continue disease focused care and full code.  Continues on IV antibiotics for panniculitis, UTI.  Will repeat labs in the AM.  Plan would be to get patient medically stabilized and discharged home with her caretakers      Code Status: Level 1 - Full Code    Subjective   Alert and oriented on exam today.  Denies pain or discomfort when asked.  Numerous family members visiting    Objective :  Temp:  [98.4 °F (36.9 °C)-98.8 °F (37.1 °C)] 98.4 °F (36.9 °C)  HR:  [49-58] 58  BP: ()/(56-90) 117/56  Resp:  [17-20] 20  SpO2:  [85 %-97 %] 85 %  O2 Device: Nasal cannula  Nasal Cannula O2 Flow Rate (L/min):  [2 L/min-4 L/min] 4 L/min    Body mass index is 80.14 kg/m².     Input and Output Summary (last 24 hours):     Intake/Output Summary (Last 24 hours) at 2/1/2025 1602  Last data filed at 2/1/2025 0900  Gross per 24 hour   Intake 572 ml   Output --   Net 572 ml       Physical Exam  Vitals reviewed.   Constitutional:       General: She is not in acute distress.     Appearance: She is well-developed. She is obese.   HENT:      Head: Normocephalic and atraumatic.   Cardiovascular:      Rate and Rhythm: Normal rate. Rhythm irregular.      Pulses: Normal pulses.      Heart sounds: Normal heart sounds. No murmur heard.  Pulmonary:      Effort: Pulmonary effort is normal. No respiratory distress.      Breath sounds: Normal breath sounds. No wheezing or rales.      Comments: Nonlabored respirations at rest on O2 2 L nasal cannula  Abdominal:      General: There is no distension.      Palpations: Abdomen is soft.      Tenderness: There is no abdominal tenderness. There is no guarding.   Musculoskeletal:         General: Swelling present.      Cervical back: No tenderness.      Right lower leg: Edema present.      Left lower leg: Edema present.   Skin:     General: Skin is warm and dry.      Capillary Refill: Capillary refill takes less than 2 seconds.   Neurological:      General: No focal deficit present.      Mental  Status: She is alert and oriented to person, place, and time.   Psychiatric:         Mood and Affect: Mood normal.         Behavior: Behavior normal.         Thought Content: Thought content normal.         Judgment: Judgment normal.           Lines/Drains:  Lines/Drains/Airways       Active Status       Name Placement date Placement time Site Days    External Urinary Catheter 01/30/25  1324  -- 2                            Lab Results: I have reviewed the following results:   Results from last 7 days   Lab Units 02/01/25  0626   WBC Thousand/uL 11.92*   HEMOGLOBIN g/dL 8.9*   HEMATOCRIT % 33.4*   PLATELETS Thousands/uL 379   SEGS PCT % 79*   LYMPHO PCT % 10*   MONO PCT % 6   EOS PCT % 3     Results from last 7 days   Lab Units 02/01/25  0626 01/31/25  1812   SODIUM mmol/L 138 137   POTASSIUM mmol/L 3.7 3.9   CHLORIDE mmol/L 97 95*   CO2 mmol/L 33* 36*   BUN mg/dL 31* 30*   CREATININE mg/dL 1.33* 1.39*   ANION GAP mmol/L 8 6   CALCIUM mg/dL 7.6* 8.0*   ALBUMIN g/dL  --  3.1*   TOTAL BILIRUBIN mg/dL  --  0.62   ALK PHOS U/L  --  80   ALT U/L  --  4*   AST U/L  --  10*   GLUCOSE RANDOM mg/dL 98 129     Results from last 7 days   Lab Units 01/31/25  1812   INR  1.95*     Results from last 7 days   Lab Units 01/30/25  0804 01/28/25  0743 01/27/25  2130 01/27/25  1758 01/27/25  1756 01/27/25  1221 01/27/25  0754 01/27/25  0149   POC GLUCOSE mg/dl 98 88 107 123 246* 87 84 96         Results from last 7 days   Lab Units 01/31/25  1812 01/30/25  0520 01/27/25  0544 01/26/25  1416   LACTIC ACID mmol/L 1.0  --   --   --    PROCALCITONIN ng/ml  --  0.08 0.06 <0.05       Recent Cultures (last 7 days):   Results from last 7 days   Lab Units 01/31/25  1812   BLOOD CULTURE  Received in Microbiology Lab. Culture in Progress.  Received in Microbiology Lab. Culture in Progress.       Imaging Results Review: I reviewed radiology reports from this admission including: CT abdomen pelvis.  Other Study Results Review: No additional  pertinent studies reviewed.    Last 24 Hours Medication List:     Current Facility-Administered Medications:     acetaminophen (TYLENOL) tablet 650 mg, Q4H PRN    allopurinol (ZYLOPRIM) tablet 100 mg, Daily    cefepime (MAXIPIME) 2 g/50 mL dextrose IVPB, Q12H TEODORO, Last Rate: 2,000 mg (02/01/25 0901)    docusate sodium (COLACE) capsule 100 mg, BID    furosemide (LASIX) tablet 60 mg, BID    gabapentin (NEURONTIN) capsule 200 mg, TID    levothyroxine tablet 137 mcg, Daily    miconazole 2 % cream, BID    midodrine (PROAMATINE) tablet 10 mg, TID AC    ondansetron (ZOFRAN) injection 4 mg, Q6H PRN    polyethylene glycol (MIRALAX) packet 17 g, Daily PRN    senna (SENOKOT) tablet 8.6 mg, HS    vancomycin (VANCOCIN) 1,250 mg in sodium chloride 0.9 % 250 mL IVPB, Q24H    vancomycin (VANCOCIN) capsule 125 mg, Q12H TEODORO    warfarin (COUMADIN) tablet 4 mg, Daily (warfarin)    Administrative Statements   Today, Patient Was Seen By: ORACIO Last  I have spent a total time of 90 minutes in caring for this patient on the day of the visit/encounter including Diagnostic results, Patient and family education, Documenting in the medical record, Reviewing / ordering tests, medicine, procedures  , Communicating with other healthcare professionals , and extensive goals of care discussion.    **Please Note: This note may have been constructed using a voice recognition system.**

## 2025-02-01 NOTE — ASSESSMENT & PLAN NOTE
UA positive  Acute mental status changes started 1/31 see plan for same  Patient has numerous antibiotic allergies  Continue antibiotic treatment with cefepime and vancomycin  No urinary complaints today  Monitor urinary output

## 2025-02-01 NOTE — PLAN OF CARE
Problem: Prexisting or High Potential for Compromised Skin Integrity  Goal: Skin integrity is maintained or improved  Description: INTERVENTIONS:  - Identify patients at risk for skin breakdown  - Assess and monitor skin integrity  - Assess and monitor nutrition and hydration status  - Monitor labs   - Assess for incontinence   - Turn and reposition patient  - Assist with mobility/ambulation  - Relieve pressure over bony prominences  - Avoid friction and shearing  - Provide appropriate hygiene as needed including keeping skin clean and dry  - Evaluate need for skin moisturizer/barrier cream  - Collaborate with interdisciplinary team   - Patient/family teaching  - Consider wound care consult   Outcome: Progressing     Problem: PAIN - ADULT  Goal: Verbalizes/displays adequate comfort level or baseline comfort level  Description: Interventions:  - Encourage patient to monitor pain and request assistance  - Assess pain using appropriate pain scale  - Administer analgesics based on type and severity of pain and evaluate response  - Implement non-pharmacological measures as appropriate and evaluate response  - Consider cultural and social influences on pain and pain management  - Notify physician/advanced practitioner if interventions unsuccessful or patient reports new pain  Outcome: Progressing     Problem: INFECTION - ADULT  Goal: Absence or prevention of progression during hospitalization  Description: INTERVENTIONS:  - Assess and monitor for signs and symptoms of infection  - Monitor lab/diagnostic results  - Monitor all insertion sites, i.e. indwelling lines, tubes, and drains  - Monitor endotracheal if appropriate and nasal secretions for changes in amount and color  - Beatty appropriate cooling/warming therapies per order  - Administer medications as ordered  - Instruct and encourage patient and family to use good hand hygiene technique  - Identify and instruct in appropriate isolation precautions for  identified infection/condition  Outcome: Progressing  Goal: Absence of fever/infection during neutropenic period  Description: INTERVENTIONS:  - Monitor WBC    Outcome: Progressing     Problem: SAFETY ADULT  Goal: Patient will remain free of falls  Description: INTERVENTIONS:  - Educate patient/family on patient safety including physical limitations  - Instruct patient to call for assistance with activity   - Consult OT/PT to assist with strengthening/mobility   - Keep Call bell within reach  - Keep bed low and locked with side rails adjusted as appropriate  - Keep care items and personal belongings within reach  - Initiate and maintain comfort rounds  - Make Fall Risk Sign visible to staff  - Offer Toileting every 2 Hours, in advance of need  - Initiate/Maintain bed alarm  - Obtain necessary fall risk management equipment: yellow socks  - Apply yellow socks and bracelet for high fall risk patients  - Consider moving patient to room near nurses station  Outcome: Progressing  Goal: Maintain or return to baseline ADL function  Description: INTERVENTIONS:  -  Assess patient's ability to carry out ADLs; assess patient's baseline for ADL function and identify physical deficits which impact ability to perform ADLs (bathing, care of mouth/teeth, toileting, grooming, dressing, etc.)  - Assess/evaluate cause of self-care deficits   - Assess range of motion  - Assess patient's mobility; develop plan if impaired  - Assess patient's need for assistive devices and provide as appropriate  - Encourage maximum independence but intervene and supervise when necessary  - Involve family in performance of ADLs  - Assess for home care needs following discharge   - Consider OT consult to assist with ADL evaluation and planning for discharge  - Provide patient education as appropriate  Outcome: Progressing  Goal: Maintains/Returns to pre admission functional level  Description: INTERVENTIONS:  - Perform AM-PAC 6 Click Basic Mobility/ Daily  Activity assessment daily.  - Set and communicate daily mobility goal to care team and patient/family/caregiver.   - Collaborate with rehabilitation services on mobility goals if consulted  - Perform Range of Motion 3 times a day.  - Reposition patient every 2 hours.  - Dangle patient 3 times a day  - Stand patient 3 times a day  - Ambulate patient 3 times a day  - Out of bed to chair 3 times a day   - Out of bed for meals 3 times a day  - Out of bed for toileting  - Record patient progress and toleration of activity level   Outcome: Progressing     Problem: DISCHARGE PLANNING  Goal: Discharge to home or other facility with appropriate resources  Description: INTERVENTIONS:  - Identify barriers to discharge w/patient and caregiver  - Arrange for needed discharge resources and transportation as appropriate  - Identify discharge learning needs (meds, wound care, etc.)  - Arrange for interpretive services to assist at discharge as needed  - Refer to Case Management Department for coordinating discharge planning if the patient needs post-hospital services based on physician/advanced practitioner order or complex needs related to functional status, cognitive ability, or social support system  Outcome: Progressing     Problem: Knowledge Deficit  Goal: Patient/family/caregiver demonstrates understanding of disease process, treatment plan, medications, and discharge instructions  Description: Complete learning assessment and assess knowledge base.  Interventions:  - Provide teaching at level of understanding  - Provide teaching via preferred learning methods  Outcome: Progressing     Problem: GASTROINTESTINAL - ADULT  Goal: Minimal or absence of nausea and/or vomiting  Description: INTERVENTIONS:  - Administer IV fluids if ordered to ensure adequate hydration  - Maintain NPO status until nausea and vomiting are resolved  - Nasogastric tube if ordered  - Administer ordered antiemetic medications as needed  - Provide  nonpharmacologic comfort measures as appropriate  - Advance diet as tolerated, if ordered  - Consider nutrition services referral to assist patient with adequate nutrition and appropriate food choices  Outcome: Progressing  Goal: Maintains or returns to baseline bowel function  Description: INTERVENTIONS:  - Assess bowel function  - Encourage oral fluids to ensure adequate hydration  - Administer IV fluids if ordered to ensure adequate hydration  - Administer ordered medications as needed  - Encourage mobilization and activity  - Consider nutritional services referral to assist patient with adequate nutrition and appropriate food choices  Outcome: Progressing  Goal: Maintains adequate nutritional intake  Description: INTERVENTIONS:  - Monitor percentage of each meal consumed  - Identify factors contributing to decreased intake, treat as appropriate  - Assist with meals as needed  - Monitor I&O, weight, and lab values if indicated  - Obtain nutrition services referral as needed  Outcome: Progressing  Goal: Establish and maintain optimal ostomy function  Description: INTERVENTIONS:  - Assess bowel function  - Encourage oral fluids to ensure adequate hydration  - Administer IV fluids if ordered to ensure adequate hydration   - Administer ordered medications as needed  - Encourage mobilization and activity  - Nutrition services referral to assist patient with appropriate food choices  - Assess stoma site  - Consider wound care consult   Outcome: Progressing  Goal: Oral mucous membranes remain intact  Description: INTERVENTIONS  - Assess oral mucosa and hygiene practices  - Implement preventative oral hygiene regimen  - Implement oral medicated treatments as ordered  - Initiate Nutrition services referral as needed  Outcome: Progressing     Problem: Nutrition/Hydration-ADULT  Goal: Nutrient/Hydration intake appropriate for improving, restoring or maintaining nutritional needs  Description: Monitor and assess patient's  nutrition/hydration status for malnutrition. Collaborate with interdisciplinary team and initiate plan and interventions as ordered.  Monitor patient's weight and dietary intake as ordered or per policy. Utilize nutrition screening tool and intervene as necessary. Determine patient's food preferences and provide high-protein, high-caloric foods as appropriate.     INTERVENTIONS:  - Monitor oral intake, urinary output, labs, and treatment plans  - Assess nutrition and hydration status and recommend course of action  - Evaluate amount of meals eaten  - Assist patient with eating if necessary   - Allow adequate time for meals  - Recommend/ encourage appropriate diets, oral nutritional supplements, and vitamin/mineral supplements  - Order, calculate, and assess calorie counts as needed  - Recommend, monitor, and adjust tube feedings and TPN/PPN based on assessed needs  - Assess need for intravenous fluids  - Provide specific nutrition/hydration education as appropriate  - Include patient/family/caregiver in decisions related to nutrition  Outcome: Progressing     Problem: Decreased Cardiac Output  Goal: Cardiac output adequate for individual needs  Description: INTERVENTIONS: Monitor for signs and symptoms of decreased cardiac output   - Monitor for dyspnea with exertion and at rest  - Monitor for orthopnea  - Monitor for signs of tachycardia. Place patient on telemetry monitoring.  - Assess patient for jugular vein distention  - Assess patient for lower extremity edema and poor peripheral perfusion   - Auscultate lung sound for Fine bibasilar crackles   - Monitor for cardiac arrythmias   - Administer beta blockers, antiarrhythmic, and blood pressure medications as ordered    Outcome: Progressing     Problem: Impaired Gas Exchange  Goal: Optimize oxygenation and ensure adequate ventilation  Description: INTERVENTIONS: Monitor for signs and symptoms of respiratory distress                - Elevate HOB or use high fowlers  to promote lung expansion                - Administer oxygen as ordered to maintain adequate oxygenation                - Encourage use of IS to promote lung expansion and prevent PN                - Monitor ABGs to assess oxygenation status                - Monitor blood oxygen level to maintain adequate oxygenation                - Encourage cough and deep breathing exercises to promote lung expansion                - Monitor patient's mental status for increased confusion    Outcome: Progressing     Problem: Excess Fluid Volume  Goal: Patient is able to achieve and maintain homeostasis  Description: INTERVENTIONS: Monitor for sign and symptoms of fluid overload  - Evaluate LE edema every shift  - Elevate LE to prevent dependent edema  - Apply ALONDRA stockings as ordered   - Monitor ankle circumference daily  - Assess for jugular vein distention  - Evaluate provider orders for the CHF diuretic algorithm. Administer diuretics as ordered  - Weigh the patient daily at 0600 and report a weight gain of five pounds or more   - Strict intake and output  - Monitor fluid intake and adhere to fluid restrictions  - Assess lung sounds every shift and as needed  - Monitor vital signs and lab values (CBC, chem, BUN, BNP)  - Measure and document urine output    Outcome: Progressing     Problem: Activity Intolerance  Goal: Patient is able to perform activities within their limitations  Description: INTERVENTIONS:                       -   Alternate periods of activity with periods of rest                 -   Patients is able to maintain normal vitals heart rhythm during activity                 -   Gradually increase activity and exercise as patient can tolerate                 -   Monitor blood pressure and heart before and after exercise                  -   Monitor blood oxygen saturation during activity and apply oxygen as needed    Outcome: Progressing     Problem: Knowledge Deficit  Goal: Patient is able to verbalize  understanding of Heart Failure after education  Description: INTERVENTIONS:  - Educate the patient and family on signs and symptoms of HF  - Provide the patient with HF education and HF zone tool  - Educate on the importance of daily weight in the AM and reporting a weight gain               of 3 or more pounds to their primary care physician  - Monitor for SOB  - Maintain and sodium and fluid restriction  - Educate the patient on the importance of medications such as: diuretics, betablockers,               antiarrhythmics and their purpose, dose, route, side effects and labs               if they are needed    Outcome: Progressing

## 2025-02-01 NOTE — ASSESSMENT & PLAN NOTE
Goals of care discussion initiated with the patient 1/30 and have been continuing on a daily basis  2/1 had extensive discussion with patient in the presence of her nephew Robbie, niece Rachelle, patient's sister, and their children regarding patient's current medical comorbidities and goals of care.  We discussed the fact that patient has been having difficulty at home managing her lymphedema and becoming volume overloaded due to hypotension and not being able to take Lasix as described despite being on midodrine 3 times a day.  We also discussed her other comorbidities including CHF, and her atrial fibrillation, bedbound status, etc. we talked about patient having more frequent hospitalizations regarding being volume overloaded and hypotensive and having trouble managing her diuretics.  Yesterday I introduced the concepts of palliative care and hospice and patient expressed understanding but wanted to discuss with her family.  We again discussed these options today.  Patient expressed that she is not ready to die yet, although she does understand the current situation that she is in.  She was agreeable to a hospice consult which was placed  Hospice liaison called and spoke to family.  Patient's niece who works in healthcare completely understands patient's current health status and would be in agreement with hospice, however she is going to support what ever her aunt wants.  Patient is declining hospice at this time  Will continue treatment focused care, full code.  Patient wants to be medically stabilized and discharged back home with her caretakers  Case management following and aware

## 2025-02-01 NOTE — CASE MANAGEMENT
Case Management Discharge Planning Note    Patient name Zee Kirk  Location /-01 MRN [id number]  : 1950 Date 2025       Current Admission Date: 2025  Current Admission Diagnosis:Panniculitis   Patient Active Problem List    Diagnosis Date Noted Date Diagnosed    Change in mental status 2025     Goals of care, counseling/discussion 2025     Bilateral lower extremity edema 2024     Hypotension 2024     Weakness 2024     Paroxysmal A-fib ([facility]) 2024     History of kidney stones 2024     Skin abnormalities 2024     Cataract, nuclear sclerotic senile, right 2024     Chronic heart failure with preserved ejection fraction ([facility]) 2024     Hypoxic episode 2024     Type 2 diabetes mellitus without complication, without long-term current use of insulin ([facility]) 2024     Long term (current) use of anticoagulants 2024     Subtherapeutic international normalized ratio (INR) 2024     Multiple open wounds 2024     Left leg pain 2023     Shingles 2023     Cellulitis- Ruled Out 2023     Longstanding persistent atrial fibrillation ([facility]) 2022     History of Clostridioides difficile infection 2022     Hypothyroid 10/03/2020     Mild episode of recurrent major depressive disorder ([facility]) 10/03/2020     Idiopathic chronic 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 10/03/2020     Class 3 severe obesity due to excess calories with serious comorbidity and body mass index (BMI) greater than or equal to 70 in adult ([facility]) 10/03/2020     Panniculitis 10/03/2020     Gastroesophageal reflux disease without esophagitis 10/03/2020     Hx MRSA infection 2020     Impaired mobility 2019     Osteoarthritis of glenohumeral joint 2019     Left ovarian cyst 2017     Depression with anxiety 07/15/2017     Anemia 2016     Adjustment disorder 2013     Irritable bowel  syndrome 12/04/2012     Left atrial enlargement 12/04/2012     Left ventricular hypertrophy 12/04/2012     Carpal tunnel syndrome 05/30/2012     Gout 05/30/2012     Hyperlipidemia 05/30/2012     Symptomatic menopausal or female climacteric states 05/30/2012       LOS (days): 5  Geometric Mean LOS (GMLOS) (days): 3.1  Days to GMLOS:-1.7     OBJECTIVE:  Risk of Unplanned Readmission Score: 42.52         Current admission status: Inpatient   Preferred Pharmacy:   [facility] [place] - [address]  [address] [place] [address]  [place] [address]  Phone: [phone] Fax: [phone]    Primary Care Provider: Franklyn Caruso MD    Primary Insurance: MEDICARE  Secondary Insurance: [facility]    DISCHARGE DETAILS:    Discharge planning discussed with:: patient and family at the bedside  Freedom of Choice: Yes  Comments - Freedom of Choice: cm received a consult for hospice care- cm met with the pt and family at the bedside - [facility] and she wants Bambi to be her RN-  referral was sent via [employer] and message was sent to the Counts include 234 beds at the [facility] hospice  CM contacted family/caregiver?: Yes             Contacts  Patient Contacts: Alistair Avalos  Relationship to Patient:: Family (niece & nephew)  Contact Method: In Person  Reason/Outcome: Discharge Planning    Requested Home Health Care         Is the patient interested in HHC at discharge?: No    DME Referral Provided  Referral made for DME?: No (hospice will order the needed dme)    Other Referral/Resources/Interventions Provided:  Interventions: Hospice  Referral Comments: hospcie referral was sent as requested    Would you like to participate in our [facility] service program?  : No - Declined    Treatment Team Recommendation:  (hospice referral was sent - BLS)

## 2025-02-01 NOTE — ASSESSMENT & PLAN NOTE
Wt Readings from Last 3 Encounters:   01/30/25 (!) 205 kg (452 lb 6.1 oz)   01/17/25 (!) 205 kg (451 lb)   01/01/25 (!) 205 kg (451 lb 14.4 oz)   11/7/2024 TTE: LVEF 60% with normal systolic function  Outpatient regimen includes furosemide 60 mg twice daily   Patient reported she was only taking Lasix in the a.m. at home due to low blood pressures despite midodrine 5 mg 3 times daily outpatient  Initially diuresing with IV Lasix   Discussed with nephrology increased midodrine to 10 mg 3 times a day to help support blood pressure for diuresis  Patient still having intermittent hypotension  Will continue home Lasix regimen with hold parameters  Continue to monitor fluid status closely  Daily weight  Intake and output monitoring

## 2025-02-02 LAB
ANION GAP SERPL CALCULATED.3IONS-SCNC: 6 MMOL/L (ref 4–13)
BUN SERPL-MCNC: 35 MG/DL (ref 5–25)
CALCIUM SERPL-MCNC: 7.9 MG/DL (ref 8.4–10.2)
CHLORIDE SERPL-SCNC: 98 MMOL/L (ref 96–108)
CO2 SERPL-SCNC: 34 MMOL/L (ref 21–32)
CREAT SERPL-MCNC: 1.4 MG/DL (ref 0.6–1.3)
ERYTHROCYTE [DISTWIDTH] IN BLOOD BY AUTOMATED COUNT: 17.2 % (ref 11.6–15.1)
GFR SERPL CREATININE-BSD FRML MDRD: 37 ML/MIN/1.73SQ M
GLUCOSE SERPL-MCNC: 101 MG/DL (ref 65–140)
HCT VFR BLD AUTO: 32.8 % (ref 34.8–46.1)
HGB BLD-MCNC: 9 G/DL (ref 11.5–15.4)
MCH RBC QN AUTO: 24.3 PG (ref 26.8–34.3)
MCHC RBC AUTO-ENTMCNC: 27.4 G/DL (ref 31.4–37.4)
MCV RBC AUTO: 89 FL (ref 82–98)
MRSA NOSE QL CULT: NORMAL
PLATELET # BLD AUTO: 370 THOUSANDS/UL (ref 149–390)
PMV BLD AUTO: 8.6 FL (ref 8.9–12.7)
POTASSIUM SERPL-SCNC: 3.7 MMOL/L (ref 3.5–5.3)
RBC # BLD AUTO: 3.7 MILLION/UL (ref 3.81–5.12)
SODIUM SERPL-SCNC: 138 MMOL/L (ref 135–147)
WBC # BLD AUTO: 10.86 THOUSAND/UL (ref 4.31–10.16)

## 2025-02-02 PROCEDURE — 99232 SBSQ HOSP IP/OBS MODERATE 35: CPT | Performed by: INTERNAL MEDICINE

## 2025-02-02 PROCEDURE — 80048 BASIC METABOLIC PNL TOTAL CA: CPT

## 2025-02-02 PROCEDURE — 85027 COMPLETE CBC AUTOMATED: CPT

## 2025-02-02 RX ADMIN — ALLOPURINOL 100 MG: 100 TABLET ORAL at 09:07

## 2025-02-02 RX ADMIN — WARFARIN SODIUM 4 MG: 2 TABLET ORAL at 18:24

## 2025-02-02 RX ADMIN — MIDODRINE HYDROCHLORIDE 10 MG: 5 TABLET ORAL at 18:28

## 2025-02-02 RX ADMIN — MICONAZOLE NITRATE: 20 CREAM TOPICAL at 18:24

## 2025-02-02 RX ADMIN — CEFEPIME 2000 MG: 2 INJECTION, POWDER, FOR SOLUTION INTRAVENOUS at 09:06

## 2025-02-02 RX ADMIN — VANCOMYCIN HYDROCHLORIDE 1250 MG: 1 INJECTION, POWDER, LYOPHILIZED, FOR SOLUTION INTRAVENOUS at 18:28

## 2025-02-02 RX ADMIN — GABAPENTIN 200 MG: 100 CAPSULE ORAL at 20:36

## 2025-02-02 RX ADMIN — GABAPENTIN 200 MG: 100 CAPSULE ORAL at 09:07

## 2025-02-02 RX ADMIN — LEVOTHYROXINE SODIUM 137 MCG: 25 TABLET ORAL at 09:06

## 2025-02-02 RX ADMIN — VANCOMYCIN HYDROCHLORIDE 125 MG: 125 CAPSULE ORAL at 09:07

## 2025-02-02 RX ADMIN — MIDODRINE HYDROCHLORIDE 10 MG: 5 TABLET ORAL at 12:32

## 2025-02-02 RX ADMIN — MIDODRINE HYDROCHLORIDE 10 MG: 5 TABLET ORAL at 09:06

## 2025-02-02 RX ADMIN — FUROSEMIDE 60 MG: 20 TABLET ORAL at 18:28

## 2025-02-02 RX ADMIN — VANCOMYCIN HYDROCHLORIDE 125 MG: 125 CAPSULE ORAL at 20:36

## 2025-02-02 RX ADMIN — DOCUSATE SODIUM 100 MG: 100 CAPSULE, LIQUID FILLED ORAL at 09:06

## 2025-02-02 RX ADMIN — FUROSEMIDE 60 MG: 20 TABLET ORAL at 09:06

## 2025-02-02 RX ADMIN — VANCOMYCIN HYDROCHLORIDE 125 MG: 125 CAPSULE ORAL at 18:27

## 2025-02-02 RX ADMIN — GABAPENTIN 200 MG: 100 CAPSULE ORAL at 18:24

## 2025-02-02 RX ADMIN — CEFEPIME 2000 MG: 2 INJECTION, POWDER, FOR SOLUTION INTRAVENOUS at 20:36

## 2025-02-02 NOTE — PROGRESS NOTES
Progress Note - Hospitalist   Name: Zee Kirk 74 y.o. female I MRN: [id number]  Unit/Bed#: -01 I Date of Admission: 1/26/2025   Date of Service: 2/2/2025 I Hospital Day: 6     Assessment & Plan  Panniculitis  POA  Patient with redness that started just prior to admission, right side with drainage. Caregiver packing area w/ gauze  Images reviewed in the chart  CT imaging with no obvious abscess  Patient was not candidate for panniculectomy in the past  Continue cefepime and vanco  Continue supportive care  Wound care following    History of Clostridioides difficile infection  Will continue oral vancomycin while on antibiotics  Hypothyroid  Continue levothyroxine  Class 3 severe obesity due to excess calories with serious comorbidity and body mass index (BMI) greater than or equal to 70 in adult (HCC)  BMI 79  Healthy diet and lifestyle modification recommended  Longstanding persistent atrial fibrillation (HCC)  Currently rate controlled  Coumadin for anticoagulation  Check INR daily  Chronic heart failure with preserved ejection fraction ([facility])  Wt Readings from Last 3 Encounters:   01/30/25 (!) 205 kg (452 lb 6.1 oz)   01/17/25 (!) 205 kg (451 lb)   01/01/25 (!) 205 kg (451 lb 14.4 oz)   11/7/2024 TTE: LVEF 60% with normal systolic function  Outpatient regimen includes furosemide 60 mg twice daily   Patient reported she was only taking Lasix in the a.m. at home due to low blood pressures despite midodrine 5 mg 3 times daily outpatient  Initially diuresing with IV Lasix   Discussed with nephrology increased midodrine to 10 mg 3 times a day to help support blood pressure for diuresis  BP reasonable today  Will continue home Lasix regimen with hold parameters  Continue to monitor fluid status closely  Daily weight  Intake and output monitoring  Type 2 diabetes mellitus without complication, without long-term current use of insulin ([facility])  Lab Results   Component Value Date    HGBA1C 7.2 (H) 11/06/2024  "      No results for input(s): \"POCGLU\" in the last 72 hours.      Blood Sugar Average: Last 72 hrs:  (P) 98  Patient denies any history of diabetes.  Monitor glucose levels on BMP as needed  Goals of care, counseling/discussion  Goals of care discussion initiated with the patient 1/30 and have been continuing on a daily basis  2/1 had extensive discussion with patient in the presence of her nephew Robbie, niece Rachelle, patient's sister, and their children regarding patient's current medical comorbidities and goals of care.  We discussed the fact that patient has been having difficulty at home managing her lymphedema and becoming volume overloaded due to hypotension and not being able to take Lasix as described despite being on midodrine 3 times a day.  We also discussed her other comorbidities including CHF, and her atrial fibrillation, bedbound status, etc. we talked about patient having more frequent hospitalizations regarding being volume overloaded and hypotensive and having trouble managing her diuretics.  Yesterday I introduced the concepts of palliative care and hospice and patient expressed understanding but wanted to discuss with her family.  We again discussed these options today.  Patient expressed that she is not ready to die yet, although she does understand the current situation that she is in.  She was agreeable to a hospice consult which was placed  Hospice liaison called and spoke to family.  Patient's niece who works in healthcare completely understands patient's current health status and would be in agreement with hospice, however she is going to support what ever her aunt wants.  Patient is declining hospice at this time  Will continue treatment focused care, full code.  Patient wants to be medically stabilized and discharged back home with her caretakers  Case management following and aware  2/2 update: The patient is more amenable to consider hospice, but would like to think about it and speak " to family prior to making a decision.  Change in mental status  Patient having acute mental status changes 1/31  UA positive  Continues on cefepime (was treating for panniculitis)  Vancomycin added 1/3 will continue  MRSA swab pending result  Pharmacy following  Transferred to [facility] 1/13 for stat head CT-negative for acute intracranial abnormality  Mental status has greatly improved today, only occasional confusion  Per discussion with nephew at bedside patient had confusion with Oxy in the past.   Continue to monitor, reorient as needed  Acute cystitis without hematuria  UA positive  Acute mental status changes started 1/31 see plan for same  Patient has numerous antibiotic allergies  Continue antibiotic treatment with cefepime and vancomycin  No urinary complaints today  Monitor urinary output    VTE Pharmacologic Prophylaxis: VTE Score: 8 High Risk (Score >/= 5) - Pharmacological DVT Prophylaxis Ordered: warfarin (Coumadin). Sequential Compression Devices Ordered.    Mobility:   Basic Mobility Inpatient Raw Score: 6  -[facility] Goal: 2: Bed activities/Dependent transfer  -[facility] Achieved: 2: Bed activities/Dependent transfer      Patient Centered Rounds: I performed bedside rounds with nursing staff today.   Discussions with Specialists or Other Care Team Provider: n/a    Education and Discussions with Family / Patient: Updated  (nephew) via phone.    Current Length of Stay: 6 day(s)  Current Patient Status: Inpatient   Certification Statement: The patient will continue to require additional inpatient hospital stay due to disposition  Discharge Plan: Anticipate discharge in 24-48 hrs to pending goals of care decision    Code Status: Level 1 - Full Code    Subjective   Patient seen and examined.  No acute events overnight.  Patient tearful, fearful, however more alert today and would like to discuss with her family and hospice regarding goals of care    Objective :  Temp:  [97.6 °F (36.4  °C)-98.4 °F (36.9 °C)] 97.8 °F (36.6 °C)  HR:  [55-64] 56  BP: (105-125)/(46-57) 111/57  Resp:  [19-22] 22  SpO2:  [93 %-98 %] 94 %  O2 Device: Nasal cannula  Nasal Cannula O2 Flow Rate (L/min):  [4 L/min] 4 L/min    Body mass index is 80.14 kg/m².     Input and Output Summary (last 24 hours):     Intake/Output Summary (Last 24 hours) at 2/2/2025 1200  Last data filed at 2/2/2025 0934  Gross per 24 hour   Intake 220 ml   Output 0 ml   Net 220 ml       Physical Exam  Vitals reviewed.   Constitutional:       Appearance: She is obese.   HENT:      Head: Normocephalic.      Nose: Nose normal.      Mouth/Throat:      Mouth: Mucous membranes are moist.   Eyes:      Pupils: Pupils are equal, round, and reactive to light.   Cardiovascular:      Rate and Rhythm: Normal rate. Rhythm irregular.   Pulmonary:      Effort: Pulmonary effort is normal. No respiratory distress.      Breath sounds: Normal breath sounds.   Musculoskeletal:      Cervical back: Normal range of motion.      Right lower leg: Edema present.      Left lower leg: Edema present.   Skin:     General: Skin is warm.      Capillary Refill: Capillary refill takes less than 2 seconds.   Neurological:      General: No focal deficit present.      Mental Status: She is alert.   Psychiatric:         Mood and Affect: Mood normal.           Lines/Drains:  Lines/Drains/Airways       Active Status       Name Placement date Placement time Site Days    External Urinary Catheter 01/30/25  1324  -- 2                            Lab Results: I have reviewed the following results:   Results from last 7 days   Lab Units 02/02/25  0508 02/01/25  0626   WBC Thousand/uL 10.86* 11.92*   HEMOGLOBIN g/dL 9.0* 8.9*   HEMATOCRIT % 32.8* 33.4*   PLATELETS Thousands/uL 370 379   SEGS PCT %  --  79*   LYMPHO PCT %  --  10*   MONO PCT %  --  6   EOS PCT %  --  3     Results from last 7 days   Lab Units 02/02/25  0508 02/01/25  0626 01/31/25  1812   SODIUM mmol/L 138   < > 137   POTASSIUM  mmol/L 3.7   < > 3.9   CHLORIDE mmol/L 98   < > 95*   CO2 mmol/L 34*   < > 36*   BUN mg/dL 35*   < > 30*   CREATININE mg/dL 1.40*   < > 1.39*   ANION GAP mmol/L 6   < > 6   CALCIUM mg/dL 7.9*   < > 8.0*   ALBUMIN g/dL  --   --  3.1*   TOTAL BILIRUBIN mg/dL  --   --  0.62   ALK PHOS U/L  --   --  80   ALT U/L  --   --  4*   AST U/L  --   --  10*   GLUCOSE RANDOM mg/dL 101   < > 129    < > = values in this interval not displayed.     Results from last 7 days   Lab Units 01/31/25  1812   INR  1.95*     Results from last 7 days   Lab Units 01/30/25  0804 01/28/25  0743 01/27/25  2130 01/27/25  1758 01/27/25  1756 01/27/25  1221 01/27/25  0754 01/27/25  0149   POC GLUCOSE mg/dl 98 88 107 123 246* 87 84 96         Results from last 7 days   Lab Units 01/31/25  1812 01/30/25  0520 01/27/25  0544 01/26/25  1416   LACTIC ACID mmol/L 1.0  --   --   --    PROCALCITONIN ng/ml  --  0.08 0.06 <0.05       Recent Cultures (last 7 days):   Results from last 7 days   Lab Units 01/31/25  1812   BLOOD CULTURE  No Growth at 24 hrs.  No Growth at 24 hrs.       Imaging Results Review: No pertinent imaging studies reviewed.  Other Study Results Review: No additional pertinent studies reviewed.    Last 24 Hours Medication List:     Current Facility-Administered Medications:     acetaminophen (TYLENOL) tablet 650 mg, Q4H PRN    allopurinol (ZYLOPRIM) tablet 100 mg, Daily    cefepime (MAXIPIME) 2 g/50 mL dextrose IVPB, Q12H TEODORO, Last Rate: 2,000 mg (02/02/25 0906)    docusate sodium (COLACE) capsule 100 mg, BID    furosemide (LASIX) tablet 60 mg, BID    gabapentin (NEURONTIN) capsule 200 mg, TID    levothyroxine tablet 137 mcg, Daily    miconazole 2 % cream, BID    midodrine (PROAMATINE) tablet 10 mg, TID AC    ondansetron (ZOFRAN) injection 4 mg, Q6H PRN    polyethylene glycol (MIRALAX) packet 17 g, Daily PRN    senna (SENOKOT) tablet 8.6 mg, HS    vancomycin (VANCOCIN) 1,250 mg in sodium chloride 0.9 % 250 mL IVPB, Q24H, Last Rate: 1,250  mg (02/01/25 1841)    vancomycin (VANCOCIN) capsule 125 mg, Q12H TEODORO    warfarin (COUMADIN) tablet 4 mg, Daily (warfarin)    Administrative Statements   Today, Patient Was Seen By: Lorenzo Rene MD  I have spent a total time of 31 minutes in caring for this patient on the day of the visit/encounter including Diagnostic results, Prognosis, Counseling / Coordination of care, Documenting in the medical record, Reviewing / ordering tests, medicine, procedures  , Obtaining or reviewing history  , and Communicating with other healthcare professionals .    **Please Note: This note may have been constructed using a voice recognition system.**

## 2025-02-02 NOTE — ASSESSMENT & PLAN NOTE
Goals of care discussion initiated with the patient 1/30 and have been continuing on a daily basis  2/1 had extensive discussion with patient in the presence of her nephew Robbie, niece Rachelle, patient's sister, and their children regarding patient's current medical comorbidities and goals of care.  We discussed the fact that patient has been having difficulty at home managing her lymphedema and becoming volume overloaded due to hypotension and not being able to take Lasix as described despite being on midodrine 3 times a day.  We also discussed her other comorbidities including CHF, and her atrial fibrillation, bedbound status, etc. we talked about patient having more frequent hospitalizations regarding being volume overloaded and hypotensive and having trouble managing her diuretics.  Yesterday I introduced the concepts of palliative care and hospice and patient expressed understanding but wanted to discuss with her family.  We again discussed these options today.  Patient expressed that she is not ready to die yet, although she does understand the current situation that she is in.  She was agreeable to a hospice consult which was placed  Hospice liaison called and spoke to family.  Patient's niece who works in healthcare completely understands patient's current health status and would be in agreement with hospice, however she is going to support what ever her aunt wants.  Patient is declining hospice at this time  Will continue treatment focused care, full code.  Patient wants to be medically stabilized and discharged back home with her caretakers  Case management following and aware  2/2 update: The patient is more amenable to consider hospice, but would like to think about it and speak to family prior to making a decision.

## 2025-02-02 NOTE — PLAN OF CARE
Problem: Prexisting or High Potential for Compromised Skin Integrity  Goal: Skin integrity is maintained or improved  Description: INTERVENTIONS:  - Identify patients at risk for skin breakdown  - Assess and monitor skin integrity  - Assess and monitor nutrition and hydration status  - Monitor labs   - Assess for incontinence   - Turn and reposition patient  - Assist with mobility/ambulation  - Relieve pressure over bony prominences  - Avoid friction and shearing  - Provide appropriate hygiene as needed including keeping skin clean and dry  - Evaluate need for skin moisturizer/barrier cream  - Collaborate with interdisciplinary team   - Patient/family teaching  - Consider wound care consult   Outcome: Progressing   Daily skin and wound care done.

## 2025-02-02 NOTE — PROGRESS NOTES
Zee Kirk is a 74 y.o. female who is currently ordered Vancomycin IV with management by the Pharmacy Consult service.  Relevant clinical data and objective / subjective history reviewed.  Vancomycin Assessment:  Indication and Goal AUC/Trough: Urinary tract infection (goal -600, trough >10)  Clinical Status: stable  Micro:     Renal Function:  SCr: 1.4 mg/dL  CrCl: 63.6 mL/min  Renal replacement: Not on dialysis  Days of Therapy: 3  Current Dose: 1250mg IV q24h  Vancomycin Plan:  New Dosinmg IV q24h  Estimated AUC: 505 mcg*hr/mL  Estimated Trough: 17.6 mcg/mL  Next Level: 2/3 AM labs  Renal Function Monitoring: Daily BMP and UOP  Pharmacy will continue to follow closely for s/sx of nephrotoxicity, infusion reactions and appropriateness of therapy.  BMP and CBC will be ordered per protocol. We will continue to follow the patient’s culture results and clinical progress daily.    Kevin Hicks, Pharmacist

## 2025-02-02 NOTE — PLAN OF CARE
Problem: Prexisting or High Potential for Compromised Skin Integrity  Goal: Skin integrity is maintained or improved  Description: INTERVENTIONS:  - Identify patients at risk for skin breakdown  - Assess and monitor skin integrity  - Assess and monitor nutrition and hydration status  - Monitor labs   - Assess for incontinence   - Turn and reposition patient  - Assist with mobility/ambulation  - Relieve pressure over bony prominences  - Avoid friction and shearing  - Provide appropriate hygiene as needed including keeping skin clean and dry  - Evaluate need for skin moisturizer/barrier cream  - Collaborate with interdisciplinary team   - Patient/family teaching  - Consider wound care consult   Outcome: Progressing     Problem: PAIN - ADULT  Goal: Verbalizes/displays adequate comfort level or baseline comfort level  Description: Interventions:  - Encourage patient to monitor pain and request assistance  - Assess pain using appropriate pain scale  - Administer analgesics based on type and severity of pain and evaluate response  - Implement non-pharmacological measures as appropriate and evaluate response  - Consider cultural and social influences on pain and pain management  - Notify physician/advanced practitioner if interventions unsuccessful or patient reports new pain  Outcome: Progressing     Problem: INFECTION - ADULT  Goal: Absence or prevention of progression during hospitalization  Description: INTERVENTIONS:  - Assess and monitor for signs and symptoms of infection  - Monitor lab/diagnostic results  - Monitor all insertion sites, i.e. indwelling lines, tubes, and drains  - Monitor endotracheal if appropriate and nasal secretions for changes in amount and color  - [place] appropriate cooling/warming therapies per order  - Administer medications as ordered  - Instruct and encourage patient and family to use good hand hygiene technique  - Identify and instruct in appropriate isolation precautions for  identified infection/condition  Outcome: Progressing  Goal: Absence of fever/infection during neutropenic period  Description: INTERVENTIONS:  - Monitor WBC    Outcome: Progressing     Problem: SAFETY ADULT  Goal: Patient will remain free of falls  Description: INTERVENTIONS:  - Educate patient/family on patient safety including physical limitations  - Instruct patient to call for assistance with activity   - Consult OT/PT to assist with strengthening/mobility   - Keep Call bell within reach  - Keep bed low and locked with side rails adjusted as appropriate  - Keep care items and personal belongings within reach  - Initiate and maintain comfort rounds  - Make Fall Risk Sign visible to staff  - Offer Toileting every 2 Hours, in advance of need  - Initiate/Maintain bed alarm  - Obtain necessary fall risk management equipment: yellow socks  - Apply yellow socks and bracelet for high fall risk patients  - Consider moving patient to room near nurses station  Outcome: Progressing  Goal: Maintain or return to baseline ADL function  Description: INTERVENTIONS:  -  Assess patient's ability to carry out ADLs; assess patient's baseline for ADL function and identify physical deficits which impact ability to perform ADLs (bathing, care of mouth/teeth, toileting, grooming, dressing, etc.)  - Assess/evaluate cause of self-care deficits   - Assess range of motion  - Assess patient's mobility; develop plan if impaired  - Assess patient's need for assistive devices and provide as appropriate  - Encourage maximum independence but intervene and supervise when necessary  - Involve family in performance of ADLs  - Assess for home care needs following discharge   - Consider OT consult to assist with ADL evaluation and planning for discharge  - Provide patient education as appropriate  Outcome: Progressing  Goal: Maintains/Returns to pre admission functional level  Description: INTERVENTIONS:  - Perform AM-PAC 6 Click Basic Mobility/ Daily  Activity assessment daily.  - Set and communicate daily mobility goal to care team and patient/family/caregiver.   - Collaborate with rehabilitation services on mobility goals if consulted  - Perform Range of Motion 3 times a day.  - Reposition patient every 2 hours.  - Dangle patient 3 times a day  - Stand patient 3 times a day  - Ambulate patient 3 times a day  - Out of bed to chair 3 times a day   - Out of bed for meals 3 times a day  - Out of bed for toileting  - Record patient progress and toleration of activity level   Outcome: Progressing     Problem: DISCHARGE PLANNING  Goal: Discharge to home or other facility with appropriate resources  Description: INTERVENTIONS:  - Identify barriers to discharge w/patient and caregiver  - Arrange for needed discharge resources and transportation as appropriate  - Identify discharge learning needs (meds, wound care, etc.)  - Arrange for interpretive services to assist at discharge as needed  - Refer to Case Management Department for coordinating discharge planning if the patient needs post-hospital services based on physician/advanced practitioner order or complex needs related to functional status, cognitive ability, or social support system  Outcome: Progressing     Problem: Knowledge Deficit  Goal: Patient/family/caregiver demonstrates understanding of disease process, treatment plan, medications, and discharge instructions  Description: Complete learning assessment and assess knowledge base.  Interventions:  - Provide teaching at level of understanding  - Provide teaching via preferred learning methods  Outcome: Progressing     Problem: GASTROINTESTINAL - ADULT  Goal: Minimal or absence of nausea and/or vomiting  Description: INTERVENTIONS:  - Administer IV fluids if ordered to ensure adequate hydration  - Maintain NPO status until nausea and vomiting are resolved  - Nasogastric tube if ordered  - Administer ordered antiemetic medications as needed  - Provide  nonpharmacologic comfort measures as appropriate  - Advance diet as tolerated, if ordered  - Consider nutrition services referral to assist patient with adequate nutrition and appropriate food choices  Outcome: Progressing  Goal: Maintains or returns to baseline bowel function  Description: INTERVENTIONS:  - Assess bowel function  - Encourage oral fluids to ensure adequate hydration  - Administer IV fluids if ordered to ensure adequate hydration  - Administer ordered medications as needed  - Encourage mobilization and activity  - Consider nutritional services referral to assist patient with adequate nutrition and appropriate food choices  Outcome: Progressing  Goal: Maintains adequate nutritional intake  Description: INTERVENTIONS:  - Monitor percentage of each meal consumed  - Identify factors contributing to decreased intake, treat as appropriate  - Assist with meals as needed  - Monitor I&O, weight, and lab values if indicated  - Obtain nutrition services referral as needed  Outcome: Progressing  Goal: Establish and maintain optimal ostomy function  Description: INTERVENTIONS:  - Assess bowel function  - Encourage oral fluids to ensure adequate hydration  - Administer IV fluids if ordered to ensure adequate hydration   - Administer ordered medications as needed  - Encourage mobilization and activity  - Nutrition services referral to assist patient with appropriate food choices  - Assess stoma site  - Consider wound care consult   Outcome: Progressing  Goal: Oral mucous membranes remain intact  Description: INTERVENTIONS  - Assess oral mucosa and hygiene practices  - Implement preventative oral hygiene regimen  - Implement oral medicated treatments as ordered  - Initiate Nutrition services referral as needed  Outcome: Progressing     Problem: Nutrition/Hydration-ADULT  Goal: Nutrient/Hydration intake appropriate for improving, restoring or maintaining nutritional needs  Description: Monitor and assess patient's  nutrition/hydration status for malnutrition. Collaborate with interdisciplinary team and initiate plan and interventions as ordered.  Monitor patient's weight and dietary intake as ordered or per policy. Utilize nutrition screening tool and intervene as necessary. Determine patient's food preferences and provide high-protein, high-caloric foods as appropriate.     INTERVENTIONS:  - Monitor oral intake, urinary output, labs, and treatment plans  - Assess nutrition and hydration status and recommend course of action  - Evaluate amount of meals eaten  - Assist patient with eating if necessary   - Allow adequate time for meals  - Recommend/ encourage appropriate diets, oral nutritional supplements, and vitamin/mineral supplements  - Order, calculate, and assess calorie counts as needed  - Recommend, monitor, and adjust tube feedings and TPN/PPN based on assessed needs  - Assess need for intravenous fluids  - Provide specific nutrition/hydration education as appropriate  - Include patient/family/caregiver in decisions related to nutrition  Outcome: Progressing     Problem: Decreased Cardiac Output  Goal: Cardiac output adequate for individual needs  Description: INTERVENTIONS: Monitor for signs and symptoms of decreased cardiac output   - Monitor for dyspnea with exertion and at rest  - Monitor for orthopnea  - Monitor for signs of tachycardia. Place patient on telemetry monitoring.  - Assess patient for jugular vein distention  - Assess patient for lower extremity edema and poor peripheral perfusion   - Auscultate lung sound for Fine bibasilar crackles   - Monitor for cardiac arrythmias   - Administer beta blockers, antiarrhythmic, and blood pressure medications as ordered    Outcome: Progressing     Problem: Impaired Gas Exchange  Goal: Optimize oxygenation and ensure adequate ventilation  Description: INTERVENTIONS: Monitor for signs and symptoms of respiratory distress                - Elevate HOB or use high fowlers  to promote lung expansion                - Administer oxygen as ordered to maintain adequate oxygenation                - Encourage use of IS to promote lung expansion and prevent PN                - Monitor ABGs to assess oxygenation status                - Monitor blood oxygen level to maintain adequate oxygenation                - Encourage cough and deep breathing exercises to promote lung expansion                - Monitor patient's mental status for increased confusion    Outcome: Progressing     Problem: Excess Fluid Volume  Goal: Patient is able to achieve and maintain homeostasis  Description: INTERVENTIONS: Monitor for sign and symptoms of fluid overload  - Evaluate LE edema every shift  - Elevate LE to prevent dependent edema  - Apply ALONDRA stockings as ordered   - Monitor ankle circumference daily  - Assess for jugular vein distention  - Evaluate provider orders for the CHF diuretic algorithm. Administer diuretics as ordered  - Weigh the patient daily at 0600 and report a weight gain of five pounds or more   - Strict intake and output  - Monitor fluid intake and adhere to fluid restrictions  - Assess lung sounds every shift and as needed  - Monitor vital signs and lab values (CBC, chem, BUN, BNP)  - Measure and document urine output    Outcome: Progressing     Problem: Activity Intolerance  Goal: Patient is able to perform activities within their limitations  Description: INTERVENTIONS:                       -   Alternate periods of activity with periods of rest                 -   Patients is able to maintain normal vitals heart rhythm during activity                 -   Gradually increase activity and exercise as patient can tolerate                 -   Monitor blood pressure and heart before and after exercise                  -   Monitor blood oxygen saturation during activity and apply oxygen as needed    Outcome: Progressing     Problem: Knowledge Deficit  Goal: Patient is able to verbalize  understanding of Heart Failure after education  Description: INTERVENTIONS:  - Educate the patient and family on signs and symptoms of HF  - Provide the patient with HF education and HF zone tool  - Educate on the importance of daily weight in the AM and reporting a weight gain               of 3 or more pounds to their primary care physician  - Monitor for SOB  - Maintain and sodium and fluid restriction  - Educate the patient on the importance of medications such as: diuretics, betablockers,               antiarrhythmics and their purpose, dose, route, side effects and labs               if they are needed    Outcome: Progressing

## 2025-02-02 NOTE — ASSESSMENT & PLAN NOTE
Wt Readings from Last 3 Encounters:   01/30/25 (!) 205 kg (452 lb 6.1 oz)   01/17/25 (!) 205 kg (451 lb)   01/01/25 (!) 205 kg (451 lb 14.4 oz)   11/7/2024 TTE: LVEF 60% with normal systolic function  Outpatient regimen includes furosemide 60 mg twice daily   Patient reported she was only taking Lasix in the a.m. at home due to low blood pressures despite midodrine 5 mg 3 times daily outpatient  Initially diuresing with IV Lasix   Discussed with nephrology increased midodrine to 10 mg 3 times a day to help support blood pressure for diuresis  BP reasonable today  Will continue home Lasix regimen with hold parameters  Continue to monitor fluid status closely  Daily weight  Intake and output monitoring

## 2025-02-02 NOTE — ASSESSMENT & PLAN NOTE
"Lab Results   Component Value Date    HGBA1C 7.2 (H) 11/06/2024       No results for input(s): \"POCGLU\" in the last 72 hours.      Blood Sugar Average: Last 72 hrs:  (P) 98  Patient denies any history of diabetes.  Monitor glucose levels on BMP as needed  "

## 2025-02-02 NOTE — ASSESSMENT & PLAN NOTE
Patient having acute mental status changes 1/31  UA positive  Continues on cefepime (was treating for panniculitis)  Vancomycin added 1/3 will continue  MRSA swab pending result  Pharmacy following  Transferred to [facility] 1/13 for stat head CT-negative for acute intracranial abnormality  Mental status has greatly improved today, only occasional confusion  Per discussion with nephew at bedside patient had confusion with Oxy in the past.   Continue to monitor, reorient as needed

## 2025-02-02 NOTE — ASSESSMENT & PLAN NOTE
POA  Patient with redness that started just prior to admission, right side with drainage. Caregiver packing area w/ gauze  Images reviewed in the chart  CT imaging with no obvious abscess  Patient was not candidate for panniculectomy in the past  Continue cefepime and vanco  Continue supportive care  Wound care following

## 2025-02-03 PROBLEM — G93.41 METABOLIC ENCEPHALOPATHY: Status: ACTIVE | Noted: 2025-02-03

## 2025-02-03 LAB
ALBUMIN SERPL BCG-MCNC: 2.9 G/DL (ref 3.5–5)
ALP SERPL-CCNC: 72 U/L (ref 34–104)
ALT SERPL W P-5'-P-CCNC: 4 U/L (ref 7–52)
ANION GAP SERPL CALCULATED.3IONS-SCNC: 8 MMOL/L (ref 4–13)
AST SERPL W P-5'-P-CCNC: 10 U/L (ref 13–39)
BILIRUB SERPL-MCNC: 0.6 MG/DL (ref 0.2–1)
BUN SERPL-MCNC: 36 MG/DL (ref 5–25)
CALCIUM ALBUM COR SERPL-MCNC: 9.1 MG/DL (ref 8.3–10.1)
CALCIUM SERPL-MCNC: 8.2 MG/DL (ref 8.4–10.2)
CHLORIDE SERPL-SCNC: 99 MMOL/L (ref 96–108)
CO2 SERPL-SCNC: 32 MMOL/L (ref 21–32)
CREAT SERPL-MCNC: 1.49 MG/DL (ref 0.6–1.3)
ERYTHROCYTE [DISTWIDTH] IN BLOOD BY AUTOMATED COUNT: 17.2 % (ref 11.6–15.1)
GFR SERPL CREATININE-BSD FRML MDRD: 34 ML/MIN/1.73SQ M
GLUCOSE SERPL-MCNC: 101 MG/DL (ref 65–140)
HCT VFR BLD AUTO: 31.4 % (ref 34.8–46.1)
HGB BLD-MCNC: 8.7 G/DL (ref 11.5–15.4)
INR PPP: 2.33 (ref 0.85–1.19)
MCH RBC QN AUTO: 24.4 PG (ref 26.8–34.3)
MCHC RBC AUTO-ENTMCNC: 27.7 G/DL (ref 31.4–37.4)
MCV RBC AUTO: 88 FL (ref 82–98)
PLATELET # BLD AUTO: 399 THOUSANDS/UL (ref 149–390)
PMV BLD AUTO: 8.8 FL (ref 8.9–12.7)
POTASSIUM SERPL-SCNC: 3.9 MMOL/L (ref 3.5–5.3)
PROT SERPL-MCNC: 7.4 G/DL (ref 6.4–8.4)
PROTHROMBIN TIME: 25.9 SECONDS (ref 12.3–15)
RBC # BLD AUTO: 3.56 MILLION/UL (ref 3.81–5.12)
SODIUM SERPL-SCNC: 139 MMOL/L (ref 135–147)
VANCOMYCIN SERPL-MCNC: 24.3 UG/ML (ref 10–20)
WBC # BLD AUTO: 11.84 THOUSAND/UL (ref 4.31–10.16)

## 2025-02-03 PROCEDURE — 85610 PROTHROMBIN TIME: CPT | Performed by: INTERNAL MEDICINE

## 2025-02-03 PROCEDURE — 80053 COMPREHEN METABOLIC PANEL: CPT | Performed by: INTERNAL MEDICINE

## 2025-02-03 PROCEDURE — 80202 ASSAY OF VANCOMYCIN: CPT | Performed by: INTERNAL MEDICINE

## 2025-02-03 PROCEDURE — 85027 COMPLETE CBC AUTOMATED: CPT | Performed by: INTERNAL MEDICINE

## 2025-02-03 PROCEDURE — 99233 SBSQ HOSP IP/OBS HIGH 50: CPT

## 2025-02-03 RX ORDER — VANCOMYCIN HYDROCHLORIDE 1 G/200ML
1000 INJECTION, SOLUTION INTRAVENOUS EVERY 24 HOURS
Status: DISCONTINUED | OUTPATIENT
Start: 2025-02-04 | End: 2025-02-04

## 2025-02-03 RX ADMIN — GABAPENTIN 200 MG: 100 CAPSULE ORAL at 09:10

## 2025-02-03 RX ADMIN — MIDODRINE HYDROCHLORIDE 10 MG: 5 TABLET ORAL at 09:11

## 2025-02-03 RX ADMIN — MIDODRINE HYDROCHLORIDE 10 MG: 5 TABLET ORAL at 17:54

## 2025-02-03 RX ADMIN — GABAPENTIN 200 MG: 100 CAPSULE ORAL at 17:54

## 2025-02-03 RX ADMIN — SENNOSIDES 8.6 MG: 8.6 TABLET, FILM COATED ORAL at 21:30

## 2025-02-03 RX ADMIN — MICONAZOLE NITRATE: 20 CREAM TOPICAL at 17:54

## 2025-02-03 RX ADMIN — FUROSEMIDE 60 MG: 20 TABLET ORAL at 09:11

## 2025-02-03 RX ADMIN — CEFEPIME 2000 MG: 2 INJECTION, POWDER, FOR SOLUTION INTRAVENOUS at 21:30

## 2025-02-03 RX ADMIN — WARFARIN SODIUM 4 MG: 2 TABLET ORAL at 17:54

## 2025-02-03 RX ADMIN — LEVOTHYROXINE SODIUM 137 MCG: 25 TABLET ORAL at 09:11

## 2025-02-03 RX ADMIN — MICONAZOLE NITRATE: 20 CREAM TOPICAL at 09:15

## 2025-02-03 RX ADMIN — GABAPENTIN 200 MG: 100 CAPSULE ORAL at 21:30

## 2025-02-03 RX ADMIN — ALLOPURINOL 100 MG: 100 TABLET ORAL at 09:11

## 2025-02-03 RX ADMIN — CEFEPIME 2000 MG: 2 INJECTION, POWDER, FOR SOLUTION INTRAVENOUS at 10:06

## 2025-02-03 RX ADMIN — FUROSEMIDE 60 MG: 20 TABLET ORAL at 17:54

## 2025-02-03 RX ADMIN — MIDODRINE HYDROCHLORIDE 10 MG: 5 TABLET ORAL at 12:35

## 2025-02-03 RX ADMIN — VANCOMYCIN HYDROCHLORIDE 125 MG: 125 CAPSULE ORAL at 21:30

## 2025-02-03 RX ADMIN — VANCOMYCIN HYDROCHLORIDE 125 MG: 125 CAPSULE ORAL at 09:10

## 2025-02-03 NOTE — PROGRESS NOTES
Progress Note - Hospitalist   Name: Zee Kirk 74 y.o. female I MRN: [id number]  Unit/Bed#: -01 I Date of Admission: 1/26/2025   Date of Service: 2/3/2025 I Hospital Day: 7    Assessment & Plan  Panniculitis  POA  Patient with redness that started just prior to admission, right side with drainage. Caregiver packing area w/ gauze  Images reviewed in the chart  CT imaging with no obvious abscess  Patient was not candidate for panniculectomy in the past  Continue cefepime and vanco  Continue supportive care  Wound care following    History of Clostridioides difficile infection  Will continue oral vancomycin while on antibiotics  Hypothyroid  Continue levothyroxine  Class 3 severe obesity due to excess calories with serious comorbidity and body mass index (BMI) greater than or equal to 70 in adult ([facility])  BMI 79  Healthy diet and lifestyle modification recommended  Longstanding persistent atrial fibrillation ([facility])  Currently rate controlled  Coumadin for anticoagulation  Check INR daily-therapeutic  Chronic heart failure with preserved ejection fraction ([facility])  Wt Readings from Last 3 Encounters:   01/30/25 (!) 205 kg (452 lb 6.1 oz)   01/17/25 (!) 205 kg (451 lb)   01/01/25 (!) 205 kg (451 lb 14.4 oz)     11/7/2024 TTE: LVEF 60% with normal systolic function  Outpatient regimen includes furosemide 60 mg twice daily   Patient reported she was only taking Lasix in the a.m. at home due to low blood pressures despite midodrine 5 mg 3 times daily outpatient  Initially diuresing with IV Lasix   Discussed with nephrology increased midodrine to 10 mg 3 times a day to help support blood pressure for diuresis  BP acceptable range today  Will continue home Lasix regimen with hold parameters  Continue to monitor fluid status closely  Daily weight  Intake and output monitoring  Type 2 diabetes mellitus without complication, without long-term current use of insulin ([facility])  Lab Results   Component Value Date    HGBA1C  "7.2 (H) 11/06/2024       No results for input(s): \"POCGLU\" in the last 72 hours.      Blood Sugar Average: Last 72 hrs:    Patient denies any history of diabetes.  Monitor glucose levels on BMP as needed  Goals of care, counseling/discussion  Goals of care discussion initiated with the patient 1/30 and have been continuing on a daily basis  2/1 had extensive discussion with patient in the presence of her nephew Robbie, niece Rachelle, patient's sister, and their children regarding patient's current medical comorbidities and goals of care.  We discussed the fact that patient has been having difficulty at home managing her lymphedema and becoming volume overloaded due to hypotension and not being able to take Lasix as described despite being on midodrine 3 times a day.  We also discussed her other comorbidities including CHF, and her atrial fibrillation, bedbound status, etc. we talked about patient having more frequent hospitalizations regarding being volume overloaded and hypotensive and having trouble managing her diuretics.  Yesterday I introduced the concepts of palliative care and hospice and patient expressed understanding but wanted to discuss with her family.  We again discussed these options today.  Patient expressed that she is not ready to die yet, although she does understand the current situation that she is in.  She was agreeable to a hospice consult which was placed  Hospice liaison called and spoke to family.  Patient's niece who works in healthcare completely understands patient's current health status and would be in agreement with hospice, however she is going to support what ever her aunt wants.  Patient is declining hospice at this time  Continuing disease focused care, plan to stabilize medically and discharge back home with her caretakers  Change in mental status  Patient having acute mental status changes 1/31  UA positive-see plan for acute cystitis without hematuria  Transferred to [facility] [place] 1/31 for stat head CT-negative for acute intracranial abnormality  Per discussion with nephew at patient had confusion with Oxy in the past-discontinued  Continues with occasional waxing and waning mentation, was alert and oriented x 3 on my exam, followed commands  Continue to monitor, reorient as needed  Acute cystitis without hematuria  UA positive  Acute mental status changes started 1/31 see plan for same  Patient has numerous antibiotic allergies  Patient is reporting burning with urination today  Continue antibiotic treatment with cefepime and vancomycin  Monitor urinary output  Metabolic encephalopathy  Due to acute cystitis see plan for same  Evidenced by change in mental status  CT head negative  Continue neurochecks  IV antibiotics for cystitis    VTE Pharmacologic Prophylaxis: VTE Score: 8 High Risk (Score >/= 5) - Pharmacological DVT Prophylaxis Ordered: warfarin (Coumadin). Sequential Compression Devices Ordered.    Mobility:   Basic Mobility Inpatient Raw Score: 6  JH-HLM Goal: 2: Bed activities/Dependent transfer  JH-HLM Achieved: 2: Bed activities/Dependent transfer  JH-HLM Goal achieved. Continue to encourage appropriate mobility.    Patient Centered Rounds: I performed bedside rounds with nursing staff today.   Discussions with Specialists or Other Care Team Provider: Nursing wound care, PT OT, case management    Education and Discussions with Family / Patient: Updated  (nephew) via phone.    Current Length of Stay: 7 day(s)  Current Patient Status: Inpatient   Certification Statement: The patient will continue to require additional inpatient hospital stay due to continued IV antibiotics for panniculitis/uti,       Discharge Plan: Continues on IV antibiotics for panniculitis/UTI.  Wants to continue disease focused care.  Plan to medically stabilized and discharge patient back home with her caretakers    Code Status: Level 1 - Full Code    Subjective   Alert and oriented x 3 on  exam.  Is complaining of some burning with urination today..    Objective :  Temp:  [97.8 °F (36.6 °C)-98.3 °F (36.8 °C)] 98.2 °F (36.8 °C)  HR:  [50-65] 64  BP: (111-116)/(55-65) 115/59  Resp:  [16-18] 18  SpO2:  [94 %-98 %] 95 %  O2 Device: Nasal cannula  Nasal Cannula O2 Flow Rate (L/min):  [3 L/min-4 L/min] 4 L/min    Body mass index is 80.14 kg/m².     Input and Output Summary (last 24 hours):     Intake/Output Summary (Last 24 hours) at 2/3/2025 1319  Last data filed at 2/3/2025 0902  Gross per 24 hour   Intake 1020 ml   Output --   Net 1020 ml       Physical Exam  Vitals reviewed.   Constitutional:       General: She is not in acute distress.     Appearance: She is well-developed. She is obese.   HENT:      Head: Normocephalic and atraumatic.   Cardiovascular:      Rate and Rhythm: Normal rate. Rhythm irregular.      Pulses: Normal pulses.      Heart sounds: Normal heart sounds. No murmur heard.  Pulmonary:      Effort: Pulmonary effort is normal. No respiratory distress.      Breath sounds: No wheezing or rales.      Comments: Nonlabored respirations at rest on O2 4 L nasal cannula, decreased breath sounds bilateral bases, no cough or shortness of breath  Abdominal:      General: There is no distension.      Palpations: Abdomen is soft.      Tenderness: There is no abdominal tenderness. There is no guarding.   Musculoskeletal:         General: Swelling present.      Cervical back: No tenderness.      Right lower leg: Edema present.      Left lower leg: Edema present.   Skin:     General: Skin is warm and dry.      Capillary Refill: Capillary refill takes less than 2 seconds.   Neurological:      General: No focal deficit present.      Mental Status: She is alert and oriented to person, place, and time.           Lines/Drains:  Lines/Drains/Airways       Active Status       Name Placement date Placement time Site Days    External Urinary Catheter 01/30/25  1324  -- 3                            Lab Results: I  have reviewed the following results:   Results from last 7 days   Lab Units 02/03/25  0614 02/02/25  0508 02/01/25  0626   WBC Thousand/uL 11.84*   < > 11.92*   HEMOGLOBIN g/dL 8.7*   < > 8.9*   HEMATOCRIT % 31.4*   < > 33.4*   PLATELETS Thousands/uL 399*   < > 379   SEGS PCT %  --   --  79*   LYMPHO PCT %  --   --  10*   MONO PCT %  --   --  6   EOS PCT %  --   --  3    < > = values in this interval not displayed.     Results from last 7 days   Lab Units 02/03/25  0614   SODIUM mmol/L 139   POTASSIUM mmol/L 3.9   CHLORIDE mmol/L 99   CO2 mmol/L 32   BUN mg/dL 36*   CREATININE mg/dL 1.49*   ANION GAP mmol/L 8   CALCIUM mg/dL 8.2*   ALBUMIN g/dL 2.9*   TOTAL BILIRUBIN mg/dL 0.60   ALK PHOS U/L 72   ALT U/L 4*   AST U/L 10*   GLUCOSE RANDOM mg/dL 101     Results from last 7 days   Lab Units 02/03/25  0614   INR  2.33*     Results from last 7 days   Lab Units 01/30/25  0804 01/28/25  0743 01/27/25  2130 01/27/25  1758 01/27/25  1756   POC GLUCOSE mg/dl 98 88 107 123 246*         Results from last 7 days   Lab Units 01/31/25  1812 01/30/25  0520   LACTIC ACID mmol/L 1.0  --    PROCALCITONIN ng/ml  --  0.08       Recent Cultures (last 7 days):   Results from last 7 days   Lab Units 01/31/25  1812   BLOOD CULTURE  No Growth at 48 hrs.  No Growth at 48 hrs.       Imaging Results Review: I reviewed radiology reports from this admission including: CT abdomen pelvis, CT head.  Other Study Results Review: No additional pertinent studies reviewed.    Last 24 Hours Medication List:     Current Facility-Administered Medications:     acetaminophen (TYLENOL) tablet 650 mg, Q4H PRN    allopurinol (ZYLOPRIM) tablet 100 mg, Daily    cefepime (MAXIPIME) 2 g/50 mL dextrose IVPB, Q12H TEODORO, Last Rate: 2,000 mg (02/03/25 1006)    docusate sodium (COLACE) capsule 100 mg, BID    furosemide (LASIX) tablet 60 mg, BID    gabapentin (NEURONTIN) capsule 200 mg, TID    levothyroxine tablet 137 mcg, Daily    miconazole 2 % cream, BID    midodrine  (PROAMATINE) tablet 10 mg, TID AC    ondansetron (ZOFRAN) injection 4 mg, Q6H PRN    polyethylene glycol (MIRALAX) packet 17 g, Daily PRN    senna (SENOKOT) tablet 8.6 mg, HS    vancomycin (VANCOCIN) capsule 125 mg, Q12H TEODORO    [START ON 2/4/2025] vancomycin (VANCOCIN) IVPB (premix in dextrose) 1,000 mg 200 mL, Q24H    warfarin (COUMADIN) tablet 4 mg, Daily (warfarin)    Administrative Statements   Today, Patient Was Seen By: ORACIO Last  I have spent a total time of 37 minutes in caring for this patient on the day of the visit/encounter including Patient and family education, Documenting in the medical record, Reviewing / ordering tests, medicine, procedures  , and Communicating with other healthcare professionals .    **Please Note: This note may have been constructed using a voice recognition system.**

## 2025-02-03 NOTE — ASSESSMENT & PLAN NOTE
Due to acute cystitis see plan for same  Evidenced by change in mental status  CT head negative  Continue neurochecks  IV antibiotics for cystitis

## 2025-02-03 NOTE — ASSESSMENT & PLAN NOTE
Wt Readings from Last 3 Encounters:   01/30/25 (!) 205 kg (452 lb 6.1 oz)   01/17/25 (!) 205 kg (451 lb)   01/01/25 (!) 205 kg (451 lb 14.4 oz)     11/7/2024 TTE: LVEF 60% with normal systolic function  Outpatient regimen includes furosemide 60 mg twice daily   Patient reported she was only taking Lasix in the a.m. at home due to low blood pressures despite midodrine 5 mg 3 times daily outpatient  Initially diuresing with IV Lasix   Discussed with nephrology increased midodrine to 10 mg 3 times a day to help support blood pressure for diuresis  BP acceptable range today  Will continue home Lasix regimen with hold parameters  Continue to monitor fluid status closely  Daily weight  Intake and output monitoring

## 2025-02-03 NOTE — ASSESSMENT & PLAN NOTE
Patient having acute mental status changes 1/31  UA positive-see plan for acute cystitis without hematuria  Transferred to [facility] 1/31 for stat head CT-negative for acute intracranial abnormality  Per discussion with nephew at patient had confusion with Oxy in the past-discontinued  Continues with occasional waxing and waning mentation, was alert and oriented x 3 on my exam, followed commands  Continue to monitor, reorient as needed

## 2025-02-03 NOTE — ASSESSMENT & PLAN NOTE
UA positive  Acute mental status changes started 1/31 see plan for same  Patient has numerous antibiotic allergies  Patient is reporting burning with urination today  Continue antibiotic treatment with cefepime and vancomycin  Monitor urinary output

## 2025-02-03 NOTE — PLAN OF CARE
Problem: PAIN - ADULT  Goal: Verbalizes/displays adequate comfort level or baseline comfort level  Description: Interventions:  - Encourage patient to monitor pain and request assistance  - Assess pain using appropriate pain scale  - Administer analgesics based on type and severity of pain and evaluate response  - Implement non-pharmacological measures as appropriate and evaluate response  - Consider cultural and social influences on pain and pain management  - Notify physician/advanced practitioner if interventions unsuccessful or patient reports new pain  Outcome: Progressing     Problem: INFECTION - ADULT  Goal: Absence or prevention of progression during hospitalization  Description: INTERVENTIONS:  - Assess and monitor for signs and symptoms of infection  - Monitor lab/diagnostic results  - Monitor all insertion sites, i.e. indwelling lines, tubes, and drains  - Monitor endotracheal if appropriate and nasal secretions for changes in amount and color  - [place] appropriate cooling/warming therapies per order  - Administer medications as ordered  - Instruct and encourage patient and family to use good hand hygiene technique  - Identify and instruct in appropriate isolation precautions for identified infection/condition  Outcome: Progressing     Problem: Decreased Cardiac Output  Goal: Cardiac output adequate for individual needs  Description: INTERVENTIONS: Monitor for signs and symptoms of decreased cardiac output   - Monitor for dyspnea with exertion and at rest  - Monitor for orthopnea  - Monitor for signs of tachycardia. Place patient on telemetry monitoring.  - Assess patient for jugular vein distention  - Assess patient for lower extremity edema and poor peripheral perfusion   - Auscultate lung sound for Fine bibasilar crackles   - Monitor for cardiac arrythmias   - Administer beta blockers, antiarrhythmic, and blood pressure medications as ordered    Outcome: Progressing

## 2025-02-03 NOTE — ASSESSMENT & PLAN NOTE
Goals of care discussion initiated with the patient 1/30 and have been continuing on a daily basis  2/1 had extensive discussion with patient in the presence of her nephew Robbie, niece Rachelle, patient's sister, and their children regarding patient's current medical comorbidities and goals of care.  We discussed the fact that patient has been having difficulty at home managing her lymphedema and becoming volume overloaded due to hypotension and not being able to take Lasix as described despite being on midodrine 3 times a day.  We also discussed her other comorbidities including CHF, and her atrial fibrillation, bedbound status, etc. we talked about patient having more frequent hospitalizations regarding being volume overloaded and hypotensive and having trouble managing her diuretics.  Yesterday I introduced the concepts of palliative care and hospice and patient expressed understanding but wanted to discuss with her family.  We again discussed these options today.  Patient expressed that she is not ready to die yet, although she does understand the current situation that she is in.  She was agreeable to a hospice consult which was placed  Hospice liaison called and spoke to family.  Patient's niece who works in healthcare completely understands patient's current health status and would be in agreement with hospice, however she is going to support what ever her aunt wants.  Patient is declining hospice at this time  Continuing disease focused care, plan to stabilize medically and discharge back home with her caretakers

## 2025-02-03 NOTE — PLAN OF CARE
Problem: Prexisting or High Potential for Compromised Skin Integrity  Goal: Skin integrity is maintained or improved  Description: INTERVENTIONS:  - Identify patients at risk for skin breakdown  - Assess and monitor skin integrity  - Assess and monitor nutrition and hydration status  - Monitor labs   - Assess for incontinence   - Turn and reposition patient  - Assist with mobility/ambulation  - Relieve pressure over bony prominences  - Avoid friction and shearing  - Provide appropriate hygiene as needed including keeping skin clean and dry  - Evaluate need for skin moisturizer/barrier cream  - Collaborate with interdisciplinary team   - Patient/family teaching  - Consider wound care consult   Outcome: Progressing     Problem: PAIN - ADULT  Goal: Verbalizes/displays adequate comfort level or baseline comfort level  Description: Interventions:  - Encourage patient to monitor pain and request assistance  - Assess pain using appropriate pain scale  - Administer analgesics based on type and severity of pain and evaluate response  - Implement non-pharmacological measures as appropriate and evaluate response  - Consider cultural and social influences on pain and pain management  - Notify physician/advanced practitioner if interventions unsuccessful or patient reports new pain  Outcome: Progressing     Problem: INFECTION - ADULT  Goal: Absence or prevention of progression during hospitalization  Description: INTERVENTIONS:  - Assess and monitor for signs and symptoms of infection  - Monitor lab/diagnostic results  - Monitor all insertion sites, i.e. indwelling lines, tubes, and drains  - Monitor endotracheal if appropriate and nasal secretions for changes in amount and color  - [place] appropriate cooling/warming therapies per order  - Administer medications as ordered  - Instruct and encourage patient and family to use good hand hygiene technique  - Identify and instruct in appropriate isolation precautions for  identified infection/condition  Outcome: Progressing  Goal: Absence of fever/infection during neutropenic period  Description: INTERVENTIONS:  - Monitor WBC    Outcome: Progressing

## 2025-02-03 NOTE — ASSESSMENT & PLAN NOTE
"Lab Results   Component Value Date    HGBA1C 7.2 (H) 11/06/2024       No results for input(s): \"POCGLU\" in the last 72 hours.      Blood Sugar Average: Last 72 hrs:    Patient denies any history of diabetes.  Monitor glucose levels on BMP as needed  "

## 2025-02-03 NOTE — PROGRESS NOTES
Zee Kirk is a 74 y.o. female who is currently ordered Vancomycin IV with management by the Pharmacy Consult service.  Relevant clinical data and objective / subjective history reviewed.  Vancomycin Assessment:  Indication and Goal AUC/Trough: Urinary tract infection (goal -600, trough >10)  Clinical Status: worsening  Micro:     Renal Function:  SCr: 1.49 mg/dL  CrCl: 59.8 mL/min  Renal replacement: Not on dialysis  Days of Therapy: 4  Current Dose: 1250 mg iv q 24 hrs  Vancomycin Plan:  New Dosin mg iv q 24 hrs  Estimated AUC: 519 mcg*hr/mL  Estimated Trough: 16.3 mcg/mL  Next Level: 2/10/25 @ 0600  Renal Function Monitoring: Daily BMP and UOP  Pharmacy will continue to follow closely for s/sx of nephrotoxicity, infusion reactions and appropriateness of therapy.  BMP and CBC will be ordered per protocol. We will continue to follow the patient’s culture results and clinical progress daily.    Cata Kumar, Pharmacist

## 2025-02-04 PROBLEM — N18.31 STAGE 3A CHRONIC KIDNEY DISEASE (HCC): Status: ACTIVE | Noted: 2025-02-04

## 2025-02-04 LAB
ANION GAP SERPL CALCULATED.3IONS-SCNC: 6 MMOL/L (ref 4–13)
BUN SERPL-MCNC: 41 MG/DL (ref 5–25)
CALCIUM SERPL-MCNC: 8.4 MG/DL (ref 8.4–10.2)
CHLORIDE SERPL-SCNC: 100 MMOL/L (ref 96–108)
CO2 SERPL-SCNC: 33 MMOL/L (ref 21–32)
CREAT SERPL-MCNC: 1.66 MG/DL (ref 0.6–1.3)
DME PARACHUTE DELIVERY DATE REQUESTED: NORMAL
DME PARACHUTE DELIVERY NOTE: NORMAL
DME PARACHUTE ITEM DESCRIPTION: NORMAL
DME PARACHUTE ORDER STATUS: NORMAL
DME PARACHUTE SUPPLIER NAME: NORMAL
DME PARACHUTE SUPPLIER PHONE: NORMAL
ERYTHROCYTE [DISTWIDTH] IN BLOOD BY AUTOMATED COUNT: 17.3 % (ref 11.6–15.1)
GFR SERPL CREATININE-BSD FRML MDRD: 30 ML/MIN/1.73SQ M
GLUCOSE SERPL-MCNC: 109 MG/DL (ref 65–140)
GLUCOSE SERPL-MCNC: 121 MG/DL (ref 65–140)
HCT VFR BLD AUTO: 32.2 % (ref 34.8–46.1)
HGB BLD-MCNC: 8.7 G/DL (ref 11.5–15.4)
INR PPP: 2.43 (ref 0.85–1.19)
MCH RBC QN AUTO: 24 PG (ref 26.8–34.3)
MCHC RBC AUTO-ENTMCNC: 27 G/DL (ref 31.4–37.4)
MCV RBC AUTO: 89 FL (ref 82–98)
PLATELET # BLD AUTO: 382 THOUSANDS/UL (ref 149–390)
PMV BLD AUTO: 8.7 FL (ref 8.9–12.7)
POTASSIUM SERPL-SCNC: 4.1 MMOL/L (ref 3.5–5.3)
PROTHROMBIN TIME: 26.7 SECONDS (ref 12.3–15)
RBC # BLD AUTO: 3.62 MILLION/UL (ref 3.81–5.12)
SODIUM SERPL-SCNC: 139 MMOL/L (ref 135–147)
WBC # BLD AUTO: 11.66 THOUSAND/UL (ref 4.31–10.16)

## 2025-02-04 PROCEDURE — G0545 PR INHERENT VISIT TO INPT: HCPCS | Performed by: INTERNAL MEDICINE

## 2025-02-04 PROCEDURE — 85610 PROTHROMBIN TIME: CPT

## 2025-02-04 PROCEDURE — 99223 1ST HOSP IP/OBS HIGH 75: CPT | Performed by: NURSE PRACTITIONER

## 2025-02-04 PROCEDURE — 80048 BASIC METABOLIC PNL TOTAL CA: CPT

## 2025-02-04 PROCEDURE — 99223 1ST HOSP IP/OBS HIGH 75: CPT | Performed by: INTERNAL MEDICINE

## 2025-02-04 PROCEDURE — 82948 REAGENT STRIP/BLOOD GLUCOSE: CPT

## 2025-02-04 PROCEDURE — 94761 N-INVAS EAR/PLS OXIMETRY MLT: CPT

## 2025-02-04 PROCEDURE — 99233 SBSQ HOSP IP/OBS HIGH 50: CPT | Performed by: NURSE PRACTITIONER

## 2025-02-04 PROCEDURE — 85027 COMPLETE CBC AUTOMATED: CPT

## 2025-02-04 RX ORDER — GABAPENTIN 100 MG/1
100 CAPSULE ORAL 3 TIMES DAILY
Status: DISCONTINUED | OUTPATIENT
Start: 2025-02-04 | End: 2025-02-06

## 2025-02-04 RX ORDER — ALBUMIN (HUMAN) 12.5 G/50ML
25 SOLUTION INTRAVENOUS ONCE
Status: COMPLETED | OUTPATIENT
Start: 2025-02-04 | End: 2025-02-04

## 2025-02-04 RX ORDER — VANCOMYCIN HYDROCHLORIDE 125 MG/1
125 CAPSULE ORAL EVERY 12 HOURS SCHEDULED
Status: DISCONTINUED | OUTPATIENT
Start: 2025-02-04 | End: 2025-02-06

## 2025-02-04 RX ADMIN — MICONAZOLE NITRATE 1 APPLICATION: 20 CREAM TOPICAL at 17:01

## 2025-02-04 RX ADMIN — MIDODRINE HYDROCHLORIDE 10 MG: 5 TABLET ORAL at 12:15

## 2025-02-04 RX ADMIN — MIDODRINE HYDROCHLORIDE 10 MG: 5 TABLET ORAL at 07:46

## 2025-02-04 RX ADMIN — ALBUMIN (HUMAN) 25 G: 0.25 INJECTION, SOLUTION INTRAVENOUS at 14:47

## 2025-02-04 RX ADMIN — VANCOMYCIN HYDROCHLORIDE 125 MG: 125 CAPSULE ORAL at 08:41

## 2025-02-04 RX ADMIN — DOCUSATE SODIUM 100 MG: 100 CAPSULE, LIQUID FILLED ORAL at 08:41

## 2025-02-04 RX ADMIN — GABAPENTIN 100 MG: 100 CAPSULE ORAL at 21:45

## 2025-02-04 RX ADMIN — VANCOMYCIN HYDROCHLORIDE 1000 MG: 1 INJECTION, SOLUTION INTRAVENOUS at 09:48

## 2025-02-04 RX ADMIN — FUROSEMIDE 60 MG: 20 TABLET ORAL at 08:41

## 2025-02-04 RX ADMIN — WARFARIN SODIUM 4 MG: 2 TABLET ORAL at 17:00

## 2025-02-04 RX ADMIN — ALLOPURINOL 100 MG: 100 TABLET ORAL at 08:41

## 2025-02-04 RX ADMIN — VANCOMYCIN HYDROCHLORIDE 125 MG: 125 CAPSULE ORAL at 21:45

## 2025-02-04 RX ADMIN — GABAPENTIN 200 MG: 100 CAPSULE ORAL at 08:41

## 2025-02-04 RX ADMIN — GABAPENTIN 100 MG: 100 CAPSULE ORAL at 16:56

## 2025-02-04 RX ADMIN — CEFEPIME 2000 MG: 2 INJECTION, POWDER, FOR SOLUTION INTRAVENOUS at 08:44

## 2025-02-04 RX ADMIN — MICONAZOLE NITRATE 1 APPLICATION: 20 CREAM TOPICAL at 08:45

## 2025-02-04 RX ADMIN — LEVOTHYROXINE SODIUM 137 MCG: 25 TABLET ORAL at 08:41

## 2025-02-04 RX ADMIN — MIDODRINE HYDROCHLORIDE 10 MG: 5 TABLET ORAL at 16:57

## 2025-02-04 RX ADMIN — DOCUSATE SODIUM 100 MG: 100 CAPSULE, LIQUID FILLED ORAL at 17:00

## 2025-02-04 NOTE — RESPIRATORY THERAPY NOTE
Home Oxygen Qualifying Test     Patient name: Zee Kirk        : 1950   Date of Test:  2025  Diagnosis:    Home Oxygen Test:    **Medicare Guidelines require item(s) 1-5 on all ambulatory patients or 1 and 2 on non-ambulatory patients.    1. Baseline SPO2 on Room Air at rest 82  %   If <= 88% on Room Air add O2 via NC to obtain SpO2 >=88%. If LPM needed, document LPM  needed to reach =>88%  Patient is bed bound   SPO2 during exertion on Room Air   %  During exertion monitor SPO2. If SPO2 increases >=89%, do not add supplemental oxygen    SPO2 on Oxygen at Rest  86  % at 1  LPM  O2 increased to 2 liters and Spo2 = 88 % at rest   O2 increased to 3 liters and Spo2 = 92 % at rest     SPO2 during exertion on Oxygen  % at  LPM    Test performed during exertion activity.  No      [x]  Supplemental Home Oxygen is indicated.    []  Client does not qualify for home oxygen.    Respiratory Additional Notes-  Patient qualified for 3 liters at rest. Patient is unable to   Ambulate. RN Cassandra and Abby from Case management  made aware .    Carmen Silva, RT

## 2025-02-04 NOTE — ASSESSMENT & PLAN NOTE
Despite having hx of c diff, patient has remains on 9 days of IV cefepime and vancomycin. Fortunately patient his not having diarrhea. Given current prolonged course of abx, she remains a risk for developing C diff infection.   Discontinue systemic abx   Continue PO vanco for 72 hours   Monitor for diarrhea

## 2025-02-04 NOTE — ASSESSMENT & PLAN NOTE
LVEF 60%, noncompliant with lasix 60mg BID due to low blood pressures in the outpatient setting. Midodrine dose increased. Was diuresed initially with IV lasix and now back on PO lasix with hold parameters.  She remains on 3 to 4 L nasal cannula and I am uncertain if this is her baseline.  May require ongoing diuresis

## 2025-02-04 NOTE — ASSESSMENT & PLAN NOTE
Patient having acute mental status changes 1/31  UA positive-see plan for acute cystitis without hematuria  Transferred to [facility] 1/31 for stat head CT-negative for acute intracranial abnormality  Per discussion with nephew at patient had confusion with Oxy in the past-discontinued  Continues with occasional waxing and waning mentation, was alert and oriented x 3 on my exam, followed commands  Continue to monitor, reorient as needed

## 2025-02-04 NOTE — PROGRESS NOTES
Zee Kirk is a 74 y.o. female who is currently ordered Vancomycin IV with management by the Pharmacy Consult service.  Relevant clinical data and objective / subjective history reviewed.  Vancomycin Assessment:  Indication and Goal AUC/Trough: Urinary tract infection (goal -600, trough >10)  Clinical Status: stable  Micro:     Renal Function:  SCr: 1.66 mg/dL  CrCl: 53.7 mL/min  Renal replacement: Not on dialysis  Days of Therapy: 5  Current Dose: 1000mg IV q24h  Vancomycin Plan:  New Dosinmg IV q24h  Estimated AUC: 553 mcg*hr/mL  Estimated Trough: 17.8 mcg/mL  Next Level: 2/10 AM Labs  Renal Function Monitoring: Daily BMP and UOP  Pharmacy will continue to follow closely for s/sx of nephrotoxicity, infusion reactions and appropriateness of therapy.  BMP and CBC will be ordered per protocol. We will continue to follow the patient’s culture results and clinical progress daily.    Kevin Hicks, Pharmacist

## 2025-02-04 NOTE — ASSESSMENT & PLAN NOTE
Difficult to truly evaluate blood pressure as small cuff on lower arm being used.  Continue midodrine 10 mg 3 times daily and monitor as best as possible.  Please use appropriate sized cuff

## 2025-02-04 NOTE — ASSESSMENT & PLAN NOTE
Wt Readings from Last 3 Encounters:   01/30/25 (!) 205 kg (452 lb 6.1 oz)   01/17/25 (!) 205 kg (451 lb)   01/01/25 (!) 205 kg (451 lb 14.4 oz)     11/7/2024 TTE: LVEF 60% with normal systolic function  Outpatient regimen includes furosemide 60 mg twice daily   Patient reported she was only taking Lasix in the a.m. at home due to low blood pressures despite midodrine 5 mg 3 times daily outpatient  Initially diuresing with IV Lasix   Previous provider discussed with on-call nephrology increased midodrine to 10 mg 3 times a day to help support blood pressure for diuresis  BP acceptable range today  continue home Lasix regimen with hold parameters  Given new LARRY we will do a formal consult to nephrology  Continue to monitor fluid status closely  Daily weight  Intake and output monitoring

## 2025-02-04 NOTE — ASSESSMENT & PLAN NOTE
Cr up to 1.66 today. Unknown etiology. No recent episodes of hypotension. Possibly due to volume overload.

## 2025-02-04 NOTE — CONSULTS
Consultation - Infectious Disease   Name: Zee Kirk 74 y.o. female I MRN: [id number]  Unit/Bed#: -01 I Date of Admission: 1/26/2025   Date of Service: 2/4/2025 I Hospital Day: 8     Inpatient consult to Infectious Diseases  Consult performed by: Wilian Zapata PA-C  Consult ordered by: ORACIO Courtney      Physician Requesting Evaluation: Alaina Dowell MD   Reason for Evaluation / Principal Problem: cystitis, panniculitis     Assessment & Plan  Panniculitis  Admitted with abdominal pain and erythema of pannus with concern for panniculitis.  Several admissions for the same and typically due to a volume issue rather than true cellulitis.  Wound care is following.  She has several excoriations to the abdomen/pannus and flanks with red irregular rash on torso.  Rash appears to be resolving but may be result of recent antibiotic use.  No acute findings on CT A/P.  Mild leukocytosis unchanged since admission. Treated with 10 days of broad-spectrum IV antibiotics with minimal improvement. This would support noninfectious etiology and patient would benefit from ongoing diuresis and weight loss. She is afebrile, and remains hemodynamically stable without sepsis.  Stop systemic antibiotics   Continue diuresis   Close wound care follow up   Supportive care per primary team   Encourage weight loss   Acute cystitis without hematuria  Patient asx. This is not an etiology for patients waxing and waning mental status. Urine cx with K. Pneumoniae,. E. Coli, E. Faecalis, and alpha hemolytic strep. She has received 9 days of IV cefepime and vanco.   Discontinue all abx  Discuss urinary colonization with pt   Address alternative sources for AMS   History of Clostridioides difficile infection  Despite having hx of c diff, patient has remains on 9 days of IV cefepime and vancomycin. Fortunately patient his not having diarrhea. Given current prolonged course of abx, she remains a risk for developing  C diff infection.   Discontinue systemic abx   Continue PO vanco for 72 hours   Monitor for diarrhea   LARRY (acute kidney injury) ([facility])  Cr up to 1.66 today. Unknown etiology. No recent episodes of hypotension. Possibly due to volume overload.   Class 3 severe obesity due to excess calories with serious comorbidity and body mass index (BMI) greater than or equal to 70 in adult ([facility])  BMI 80. Patient severely debilitated by her morbid obesity. Recommend diet, weight loss and follow up with PCP.  Chronic heart failure with preserved ejection fraction ([facility])  LVEF 60%, noncompliant with lasix 60mg BID due to low blood pressures in the outpatient setting. Midodrine dose increased. Was diuresed initially with IV lasix and now back on PO lasix with hold parameters.  She remains on 3 to 4 L nasal cannula and I am uncertain if this is her baseline.  May require ongoing diuresis  Type 2 diabetes mellitus without complication, without long-term current use of insulin ([facility])  HA1C 7.2. Remains a risk factor for infection. Recommend tight glycemic control.     I have discussed the above management plan in detail with the primary service.   Infectious Disease service will follow.    Antibiotics:  D9 IV vanco  D10 IV cefepime   D9 PO vanco     History of Present Illness   Zee Kirk is a 74 y.o. year old female with morbid obesity and multiple comorbidities with large pannus with history of recurrent panniculitis and volume overload, history of C. difficile, several antibiotic intolerances who presented on 1/26 with abdominal pain and increased serous, nonpurulent drainage from right abdominal wound (pt calls this part of her panus Abhishek).  Patient also reported increased erythema at the site.  She did not meet sepsis criteria on admission.  She denied fevers or chills.  She states she was recently on Ceftin and subsequently Macrobid for possible UTI and she thought this flared up her irritable bowel and caused abdominal  pain.  She had CT abdomen pelvis which was negative for acute findings.  She reported nausea and vomiting the day prior to admission.  She was started on IV Lasix for possible volume overload and IV antibiotics for panniculitis.  Given history of C. difficile, she was started on Vanco prophylaxis.  She was seen by wound care and found to have several excoriations to abdomen with scant amount of serous drainage with macerated skin and an irregular erythematous rash.  Due to frequent admissions and hypotension in the setting of orthostatic hypotension and Lasix use goals of care was discussed.  Subsequently patient became very anxious and reportedly altered requiring CT of her head which was performed at an outside campus due to patient's body habitus and inability to fit in CT scanner at [facility].  Patient was not ready to pursue hospice care.  Midodrine dosing was increased due to persistent hypotension and IV Lasix was converted to p.o.  Nephrology was consulted due to worsening LARRY.  Infectious disease consultation requested for ongoing antibiotic management.    A complete review of systems is negative other than that noted in the HPI.    I have reviewed the patient's PMH, PSH, Social History, Family History, Meds, and Allergies    Objective :  Temp:  [97.8 °F (36.6 °C)-98.2 °F (36.8 °C)] 98.2 °F (36.8 °C)  HR:  [51-58] 51  BP: (108-136)/(54-73) 116/69  Resp:  [17-19] 19  SpO2:  [94 %-97 %] 95 %  O2 Device: Nasal cannula  Nasal Cannula O2 Flow Rate (L/min):  [4 L/min] 4 L/min    Physical exam:   General:  No acute distress, laying in bariatric bed, chronically ill-appearing elderly female  Psychiatric:  Awake and alert, tangential and forgetful  HEENT: Mucous membranes moist, neck supple, head normocephalic  CV: Distant heart sounds due to body habitus  Pulmonary: Decreased breath sounds due to body habitus, on 3 to 4 L  Abdomen: Morbidly obese.  Large pannus with chronic skin changes and discoloration  without cellulitis, warmth.    Extremities: Chronic edema of extremities  Skin: Dry erythematous rash on torso, now mostly to upper chest.  Clinical media reviewed of chronic pannicular skin discoloration and excoriations  : [place] patent        Lab Results: I have reviewed the following results:  Results from last 7 days   Lab Units 02/04/25  0431 02/03/25  0614 02/02/25  0508   WBC Thousand/uL 11.66* 11.84* 10.86*   HEMOGLOBIN g/dL 8.7* 8.7* 9.0*   PLATELETS Thousands/uL 382 399* 370     Results from last 7 days   Lab Units 02/04/25  0431 02/03/25  0614 02/02/25  0508 02/01/25  0626 01/31/25 1812   SODIUM mmol/L 139 139 138   < > 137   POTASSIUM mmol/L 4.1 3.9 3.7   < > 3.9   CHLORIDE mmol/L 100 99 98   < > 95*   CO2 mmol/L 33* 32 34*   < > 36*   BUN mg/dL 41* 36* 35*   < > 30*   CREATININE mg/dL 1.66* 1.49* 1.40*   < > 1.39*   EGFR ml/min/1.73sq m 30 34 37   < > 37   CALCIUM mg/dL 8.4 8.2* 7.9*   < > 8.0*   AST U/L  --  10*  --   --  10*   ALT U/L  --  4*  --   --  4*   ALK PHOS U/L  --  72  --   --  80   ALBUMIN g/dL  --  2.9*  --   --  3.1*    < > = values in this interval not displayed.     Results from last 7 days   Lab Units 01/31/25  1814 01/31/25 1812   BLOOD CULTURE   --  No Growth at 72 hrs.  No Growth at 72 hrs.   MRSA CULTURE ONLY  No Methicillin Resistant Staphlyococcus aureus (MRSA) isolated  --      Results from last 7 days   Lab Units 01/30/25  0520   PROCALCITONIN ng/ml 0.08                   Imaging Results Review: I reviewed radiology reports from this admission including: chest xray, CT abdomen/pelvis, and CT head.  Other Study Results Review: Other studies reviewed include: micro

## 2025-02-04 NOTE — ASSESSMENT & PLAN NOTE
Patient is on Lasix at home has been working peripherally with the team  Patient unfortunately requires midodrine 10 mg 3 times daily  Creatinine has been uptrending now at 1.66  Given patient remains treatment focused will consult nephrology

## 2025-02-04 NOTE — ASSESSMENT & PLAN NOTE
POA  Patient with redness that started just prior to admission, right side with drainage. Caregiver packing area w/ gauze  Images reviewed in the chart  CT imaging with no obvious abscess  Patient was not candidate for panniculectomy in the past  Currently on IV cefepime and vanco   Will consult ID due to increasing procal. Area overall   Patient's procalcitonin continues to go up currently at 2.34 has been on 8 days of antibiotics  Will consult ID for effective management of panniculitis and cystitis  Continue supportive care  Wound care following

## 2025-02-04 NOTE — ASSESSMENT & PLAN NOTE
Peak creatinine today 1.66 mg/dL.  Etiology presumed intravascular volume contraction in setting of weeping wound, frequent stools.  Volume status is very difficult to ascertain given body habitus BMI of 80.  There is edema however she is hypoalbuminemic and immobile.  I/O are not accurate-Per nursing, she cannot use pure wick and has large amounts of incontinent urine.  Also noted to have a rash which is potentially due to antibiotics per infectious disease specialist-query AIN.     Hold Lasix today and reevaluate on a day-to-day basis.  Give 25 g 25% albumin x 1.  Continue midodrine 10 mg 3 times daily and attempt to use appropriate sized cuff, check urine studies if able to collect, check bladder scan, daily weight and I's/O added, add on CK.  Renally dose antibiotics.  Most recent random vancomycin levels slightly elevated.  Reduce gabapentin as outlined below.  Check CBC with differential to look for peripheral eosinophilia tomorrow

## 2025-02-04 NOTE — ASSESSMENT & PLAN NOTE
Wt Readings from Last 3 Encounters:   01/30/25 (!) 205 kg (452 lb 6.1 oz)   01/17/25 (!) 205 kg (451 lb)   01/01/25 (!) 205 kg (451 lb 14.4 oz)   Diuretic holiday and follow response.  Daily weight and strict I's/O ordered today by me.

## 2025-02-04 NOTE — ASSESSMENT & PLAN NOTE
Lab Results   Component Value Date    EGFR 30 02/04/2025    EGFR 34 02/03/2025    EGFR 37 02/02/2025    CREATININE 1.66 (H) 02/04/2025    CREATININE 1.49 (H) 02/03/2025    CREATININE 1.40 (H) 02/02/2025   Baseline creatinine appears to be around 1.1-1.2 mg/dL.  Atrophic kidneys via CT scan done earlier in admission.

## 2025-02-04 NOTE — ASSESSMENT & PLAN NOTE
BMI 80. Patient severely debilitated by her morbid obesity. Recommend diet, weight loss and follow up with PCP.

## 2025-02-04 NOTE — PLAN OF CARE
Problem: Prexisting or High Potential for Compromised Skin Integrity  Goal: Skin integrity is maintained or improved  Description: INTERVENTIONS:  - Identify patients at risk for skin breakdown  - Assess and monitor skin integrity  - Assess and monitor nutrition and hydration status  - Monitor labs   - Assess for incontinence   - Turn and reposition patient  - Assist with mobility/ambulation  - Relieve pressure over bony prominences  - Avoid friction and shearing  - Provide appropriate hygiene as needed including keeping skin clean and dry  - Evaluate need for skin moisturizer/barrier cream  - Collaborate with interdisciplinary team   - Patient/family teaching  - Consider wound care consult   Outcome: Progressing     Problem: PAIN - ADULT  Goal: Verbalizes/displays adequate comfort level or baseline comfort level  Description: Interventions:  - Encourage patient to monitor pain and request assistance  - Assess pain using appropriate pain scale  - Administer analgesics based on type and severity of pain and evaluate response  - Implement non-pharmacological measures as appropriate and evaluate response  - Consider cultural and social influences on pain and pain management  - Notify physician/advanced practitioner if interventions unsuccessful or patient reports new pain  Outcome: Progressing     Problem: INFECTION - ADULT  Goal: Absence or prevention of progression during hospitalization  Description: INTERVENTIONS:  - Assess and monitor for signs and symptoms of infection  - Monitor lab/diagnostic results  - Monitor all insertion sites, i.e. indwelling lines, tubes, and drains  - Monitor endotracheal if appropriate and nasal secretions for changes in amount and color  - [place] appropriate cooling/warming therapies per order  - Administer medications as ordered  - Instruct and encourage patient and family to use good hand hygiene technique  - Identify and instruct in appropriate isolation precautions for  identified infection/condition  Outcome: Progressing  Goal: Absence of fever/infection during neutropenic period  Description: INTERVENTIONS:  - Monitor WBC    Outcome: Progressing     Problem: SAFETY ADULT  Goal: Patient will remain free of falls  Description: INTERVENTIONS:  - Educate patient/family on patient safety including physical limitations  - Instruct patient to call for assistance with activity   - Consult OT/PT to assist with strengthening/mobility   - Keep Call bell within reach  - Keep bed low and locked with side rails adjusted as appropriate  - Keep care items and personal belongings within reach  - Initiate and maintain comfort rounds  - Make Fall Risk Sign visible to staff  - Offer Toileting every 2 Hours, in advance of need  - Initiate/Maintain bed alarm  - Obtain necessary fall risk management equipment: yellow socks  - Apply yellow socks and bracelet for high fall risk patients  - Consider moving patient to room near nurses station  Outcome: Progressing  Goal: Maintain or return to baseline ADL function  Description: INTERVENTIONS:  -  Assess patient's ability to carry out ADLs; assess patient's baseline for ADL function and identify physical deficits which impact ability to perform ADLs (bathing, care of mouth/teeth, toileting, grooming, dressing, etc.)  - Assess/evaluate cause of self-care deficits   - Assess range of motion  - Assess patient's mobility; develop plan if impaired  - Assess patient's need for assistive devices and provide as appropriate  - Encourage maximum independence but intervene and supervise when necessary  - Involve family in performance of ADLs  - Assess for home care needs following discharge   - Consider OT consult to assist with ADL evaluation and planning for discharge  - Provide patient education as appropriate  Outcome: Progressing  Goal: Maintains/Returns to pre admission functional level  Description: INTERVENTIONS:  - Perform AM-PAC 6 Click Basic Mobility/ Daily  Activity assessment daily.  - Set and communicate daily mobility goal to care team and patient/family/caregiver.   - Collaborate with rehabilitation services on mobility goals if consulted  - Perform Range of Motion 3 times a day.  - Reposition patient every 2 hours.  - Dangle patient 3 times a day  - Stand patient 3 times a day  - Ambulate patient 3 times a day  - Out of bed to chair 3 times a day   - Out of bed for meals 3 times a day  - Out of bed for toileting  - Record patient progress and toleration of activity level   Outcome: Progressing     Problem: DISCHARGE PLANNING  Goal: Discharge to home or other facility with appropriate resources  Description: INTERVENTIONS:  - Identify barriers to discharge w/patient and caregiver  - Arrange for needed discharge resources and transportation as appropriate  - Identify discharge learning needs (meds, wound care, etc.)  - Arrange for interpretive services to assist at discharge as needed  - Refer to Case Management Department for coordinating discharge planning if the patient needs post-hospital services based on physician/advanced practitioner order or complex needs related to functional status, cognitive ability, or social support system  Outcome: Progressing     Problem: Knowledge Deficit  Goal: Patient/family/caregiver demonstrates understanding of disease process, treatment plan, medications, and discharge instructions  Description: Complete learning assessment and assess knowledge base.  Interventions:  - Provide teaching at level of understanding  - Provide teaching via preferred learning methods  Outcome: Progressing     Problem: GASTROINTESTINAL - ADULT  Goal: Minimal or absence of nausea and/or vomiting  Description: INTERVENTIONS:  - Administer IV fluids if ordered to ensure adequate hydration  - Maintain NPO status until nausea and vomiting are resolved  - Nasogastric tube if ordered  - Administer ordered antiemetic medications as needed  - Provide  nonpharmacologic comfort measures as appropriate  - Advance diet as tolerated, if ordered  - Consider nutrition services referral to assist patient with adequate nutrition and appropriate food choices  Outcome: Progressing  Goal: Maintains or returns to baseline bowel function  Description: INTERVENTIONS:  - Assess bowel function  - Encourage oral fluids to ensure adequate hydration  - Administer IV fluids if ordered to ensure adequate hydration  - Administer ordered medications as needed  - Encourage mobilization and activity  - Consider nutritional services referral to assist patient with adequate nutrition and appropriate food choices  Outcome: Progressing  Goal: Maintains adequate nutritional intake  Description: INTERVENTIONS:  - Monitor percentage of each meal consumed  - Identify factors contributing to decreased intake, treat as appropriate  - Assist with meals as needed  - Monitor I&O, weight, and lab values if indicated  - Obtain nutrition services referral as needed  Outcome: Progressing  Goal: Establish and maintain optimal ostomy function  Description: INTERVENTIONS:  - Assess bowel function  - Encourage oral fluids to ensure adequate hydration  - Administer IV fluids if ordered to ensure adequate hydration   - Administer ordered medications as needed  - Encourage mobilization and activity  - Nutrition services referral to assist patient with appropriate food choices  - Assess stoma site  - Consider wound care consult   Outcome: Progressing  Goal: Oral mucous membranes remain intact  Description: INTERVENTIONS  - Assess oral mucosa and hygiene practices  - Implement preventative oral hygiene regimen  - Implement oral medicated treatments as ordered  - Initiate Nutrition services referral as needed  Outcome: Progressing     Problem: Nutrition/Hydration-ADULT  Goal: Nutrient/Hydration intake appropriate for improving, restoring or maintaining nutritional needs  Description: Monitor and assess patient's  nutrition/hydration status for malnutrition. Collaborate with interdisciplinary team and initiate plan and interventions as ordered.  Monitor patient's weight and dietary intake as ordered or per policy. Utilize nutrition screening tool and intervene as necessary. Determine patient's food preferences and provide high-protein, high-caloric foods as appropriate.     INTERVENTIONS:  - Monitor oral intake, urinary output, labs, and treatment plans  - Assess nutrition and hydration status and recommend course of action  - Evaluate amount of meals eaten  - Assist patient with eating if necessary   - Allow adequate time for meals  - Recommend/ encourage appropriate diets, oral nutritional supplements, and vitamin/mineral supplements  - Order, calculate, and assess calorie counts as needed  - Recommend, monitor, and adjust tube feedings and TPN/PPN based on assessed needs  - Assess need for intravenous fluids  - Provide specific nutrition/hydration education as appropriate  - Include patient/family/caregiver in decisions related to nutrition  Outcome: Progressing     Problem: Decreased Cardiac Output  Goal: Cardiac output adequate for individual needs  Description: INTERVENTIONS: Monitor for signs and symptoms of decreased cardiac output   - Monitor for dyspnea with exertion and at rest  - Monitor for orthopnea  - Monitor for signs of tachycardia. Place patient on telemetry monitoring.  - Assess patient for jugular vein distention  - Assess patient for lower extremity edema and poor peripheral perfusion   - Auscultate lung sound for Fine bibasilar crackles   - Monitor for cardiac arrythmias   - Administer beta blockers, antiarrhythmic, and blood pressure medications as ordered    Outcome: Progressing     Problem: Impaired Gas Exchange  Goal: Optimize oxygenation and ensure adequate ventilation  Description: INTERVENTIONS: Monitor for signs and symptoms of respiratory distress                - Elevate HOB or use high fowlers  to promote lung expansion                - Administer oxygen as ordered to maintain adequate oxygenation                - Encourage use of IS to promote lung expansion and prevent PN                - Monitor ABGs to assess oxygenation status                - Monitor blood oxygen level to maintain adequate oxygenation                - Encourage cough and deep breathing exercises to promote lung expansion                - Monitor patient's mental status for increased confusion    Outcome: Progressing     Problem: Excess Fluid Volume  Goal: Patient is able to achieve and maintain homeostasis  Description: INTERVENTIONS: Monitor for sign and symptoms of fluid overload  - Evaluate LE edema every shift  - Elevate LE to prevent dependent edema  - Apply ALONDRA stockings as ordered   - Monitor ankle circumference daily  - Assess for jugular vein distention  - Evaluate provider orders for the CHF diuretic algorithm. Administer diuretics as ordered  - Weigh the patient daily at 0600 and report a weight gain of five pounds or more   - Strict intake and output  - Monitor fluid intake and adhere to fluid restrictions  - Assess lung sounds every shift and as needed  - Monitor vital signs and lab values (CBC, chem, BUN, BNP)  - Measure and document urine output    Outcome: Progressing     Problem: Activity Intolerance  Goal: Patient is able to perform activities within their limitations  Description: INTERVENTIONS:                       -   Alternate periods of activity with periods of rest                 -   Patients is able to maintain normal vitals heart rhythm during activity                 -   Gradually increase activity and exercise as patient can tolerate                 -   Monitor blood pressure and heart before and after exercise                  -   Monitor blood oxygen saturation during activity and apply oxygen as needed    Outcome: Progressing     Problem: Knowledge Deficit  Goal: Patient is able to verbalize  understanding of Heart Failure after education  Description: INTERVENTIONS:  - Educate the patient and family on signs and symptoms of HF  - Provide the patient with HF education and HF zone tool  - Educate on the importance of daily weight in the AM and reporting a weight gain               of 3 or more pounds to their primary care physician  - Monitor for SOB  - Maintain and sodium and fluid restriction.  - Educate the patient on the importance of medications such as: diuretics, betablockers, .              antiarrhythmics and their purpose, dose, route, side effects and labs               if they are needed.    Outcome: Progressing

## 2025-02-04 NOTE — CONSULTS
[facility]  Consultation   Zee Kirk 74 y.o. female MRN: [id number]  Unit/Bed#: -01 Encounter: [id number]        Assessment & Plan  LARRY (acute kidney injury) (HCC)  Peak creatinine today 1.66 mg/dL.  Etiology presumed intravascular volume contraction in setting of weeping wound, frequent stools.  Volume status is very difficult to ascertain given body habitus BMI of 80.  There is edema however she is hypoalbuminemic and immobile.  I/O are not accurate-Per nursing, she cannot use pure wick and has large amounts of incontinent urine.  Also noted to have a rash which is potentially due to antibiotics per infectious disease specialist-query AIN.     Hold Lasix today and reevaluate on a day-to-day basis.  Give 25 g 25% albumin x 1.  Continue midodrine 10 mg 3 times daily and attempt to use appropriate sized cuff, check urine studies if able to collect, check bladder scan, daily weight and I's/O added, add on CK.  Renally dose antibiotics.  Most recent random vancomycin levels slightly elevated.  Reduce gabapentin as outlined below.  Check CBC with differential to look for peripheral eosinophilia tomorrow      Stage 3a chronic kidney disease (HCC)  Lab Results   Component Value Date    EGFR 30 02/04/2025    EGFR 34 02/03/2025    EGFR 37 02/02/2025    CREATININE 1.66 (H) 02/04/2025    CREATININE 1.49 (H) 02/03/2025    CREATININE 1.40 (H) 02/02/2025   Baseline creatinine appears to be around 1.1-1.2 mg/dL.  Atrophic kidneys via CT scan done earlier in admission.  Panniculitis  Systemic antibiotics discontinued per infectious disease specialist today.  Chronic heart failure with preserved ejection fraction (HCC)  Wt Readings from Last 3 Encounters:   01/30/25 (!) 205 kg (452 lb 6.1 oz)   01/17/25 (!) 205 kg (451 lb)   01/01/25 (!) 205 kg (451 lb 14.4 oz)   Diuretic holiday and follow response.  Daily weight and strict I's/O ordered today by me.          Metabolic  encephalopathy  Query related to cefepime versus gabapentin.  Reduce gabapentin to 100 mg 3 times daily  Hypotension  Difficult to truly evaluate blood pressure as small cuff on lower arm being used.  Continue midodrine 10 mg 3 times daily and monitor as best as possible.  Please use appropriate sized cuff        HPI:    Zee Kirk is a 74 y.o. female with CKD 3, morbid obesity with BMI 80, heart failure preserved ejection fraction, atrial fibrillation, T2DM, history of C. difficile admitted 1/26 for redness and drainage of abdomen being treated for panniculitis and cystitis with cefepime and vancomycin.  She has been receiving diuresis with IV Lasix.  A nephrology consult was requested today for acute kidney injury    Upon discussion with the patient she is a poor historian.  She tells me she does not know where she is or why she is here.  She denies physical complaints when asked system by system.    Most of history obtained from bedside RN.  Per her report, patient is incontinent of large amounts of urine.  She eats 100% of meals.  Her mentation waxes and wanes.  She also weeps from her abdominal wound.  She is also noted to have frequent loose bowel movements however they are small in amount.    From a nephrology perspective, appears to have stage IIIa with baseline creatinine 1.1-1.2 mg/dL    Reason for Consult: Acute kidney injury    Review of Systems:  A complete 10-point review of systems was performed. Aside from what was mentioned in the HPI, it is otherwise negative.      Historical Information   Past Medical History:   Diagnosis Date    Atrial fibrillation (HCC)     Bladder stone     C. difficile colitis     Cataract, nuclear sclerotic senile, right 12/05/2024    Cellulitis     CHF (congestive heart failure) (HCC)     Depression with anxiety     Disease of thyroid gland     Diverticulitis     Enterocolitis     History of abnormal cervical Pap smear     Kidney stone     Lymphedema      Menopause     Age 50    Morbid obesity with BMI of 70 and over, adult (HCC)     MRSA (methicillin resistant Staphylococcus aureus)     abdominal wound    Osteoarthritis     Phlebitis     left lower leg    Sleep apnea     Spondylolysis      Past Surgical History:   Procedure Laterality Date    APPENDECTOMY      CARPAL TUNNEL RELEASE      CATARACT EXTRACTION Right     CHOLECYSTECTOMY      CYSTOSCOPY      stent    EGD      FOOT SURGERY      bone spur    KNEE SURGERY      ME ICAPSULAR CATARACT XTRJ INSJ IO LENS PRSTH 1 STG Right 2024    Procedure: CATARACT EXTRACTION WITH IOL IMPLANTATION;  Surgeon: Chantel Llanos MD;  Location: [facility];  Service: Ophthalmology    WISDOM TOOTH EXTRACTION       Social History   Social History     Substance and Sexual Activity   Alcohol Use Not Currently     Social History     Substance and Sexual Activity   Drug Use Not Currently    Types: Marijuana    Comment: As a teen - As per [employer]      Social History     Tobacco Use   Smoking Status Former    Current packs/day: 0.00    Average packs/day: 5.0 packs/day for 3.0 years (15.0 ttl pk-yrs)    Types: Cigarettes    Start date: 1967    Quit date: 1970    Years since quittin.6   Smokeless Tobacco Never   Tobacco Comments    5 packs/day until  (age [age])        Family History:   Family History   Problem Relation Age of Onset    Heart failure Mother     Heart disease Mother     Lymphoma Mother     Seizures Mother     Kidney disease Father     Heart disease Father     Heart failure Father     Diabetes type II Father     Diabetes type II Sister     Diabetes type II Brother     Uterine cancer Paternal Aunt     Breast cancer Neg Hx     Colon cancer Neg Hx     Ovarian cancer Neg Hx        Medications:  Pertinent medications were reviewed  Current Facility-Administered Medications   Medication Dose Route Frequency Provider Last Rate    acetaminophen  650 mg Oral Q4H PRN Anjelica Dey PA-C      allopurinol   100 mg Oral Daily Anup Hammer PA-C      docusate sodium  100 mg Oral BID ORACIO Last      furosemide  60 mg Oral BID ORACIO Last      gabapentin  200 mg Oral TID Anup Hammer PA-C      levothyroxine  137 mcg Oral Daily Anup Hammer PA-C      miconazole   Topical BID ORACIO Last      midodrine  10 mg Oral TID AC ORACIO Last      ondansetron  4 mg Intravenous Q6H PRN Anjelica Dey PA-C      polyethylene glycol  17 g Oral Daily PRN ORACIO Last      senna  1 tablet Oral HS ORACIO Lats      vancomycin oral (capsules or solution)  125 mg Oral Q12H [facility] Wilian Zapata PA-C      warfarin  4 mg Oral Daily (warfarin) ORACIO Last           Allergies   Allergen Reactions    Augmentin Es-600 [Amoxicillin-Pot Clavulanate] Anaphylaxis and Rash    Bactrim [Sulfamethoxazole-Trimethoprim] Anaphylaxis    Cefazolin Itching and Other (See Comments)     Patient developed itching and difficulty swallowing following IV cefazolin administration at [facility] 7/19/20 which required treatment with benadryl and epinephrine - has tolerated other cephalosporins with different side chains including cefepime, cefuroxime, and cephalexin    Ciprofloxacin Anaphylaxis    Doxycycline Anaphylaxis    Keflex [Cephalexin] Anaphylaxis    Penicillins Anaphylaxis, Rash and Other (See Comments)     Tolerates cefepime (11/18/21)    Morphine Delirium    Other Rash, Irritability and Edema     Patient reports significant reaction to the use of Max Orb in the abdominal folds leading to swelling, excoriation, maceration and weeping of the skin.     Omeprazole GI Intolerance    Oxycontin [Oxycodone] Other (See Comments)     Agitation, extreme confusion    Pork-Derived Products - Food Allergy Diarrhea    Sulfa Antibiotics Hives    Latex Rash and Edema    Vitamin C [Ascorbate - Food Allergy] Rash         Vitals:   /69 (BP Location: Left arm)   Pulse (!) 51    "Temp 98.2 °F (36.8 °C) (Axillary)   Resp 19   Ht 5' 3\" (1.6 m)   Wt (!) 205 kg (452 lb 6.1 oz)   LMP  (LMP Unknown)   SpO2 95%   BMI 80.14 kg/m²   Body mass index is 80.14 kg/m².  SpO2: 95 %,   SpO2 Activity: At Rest,   O2 Device: Nasal cannula      Intake/Output Summary (Last 24 hours) at 2/4/2025 1436  Last data filed at 2/4/2025 1245  Gross per 24 hour   Intake 390 ml   Output --   Net 390 ml     Invasive Devices       Peripheral Intravenous Line  Duration             Peripheral IV 01/31/25 Right Antecubital 3 days              Drain  Duration             External Urinary Catheter 5 days                    Physical Exam:  General: conscious, cooperative, in no acute distress  Eyes: conjunctivae pink, anicteric sclerae  ENT: lips and mucous membranes moist  Neck: supple, no JVD, no masses  Chest: Diminished breath sounds bilateral, no crackles, ronchus or wheezings  CVS: S1 & S2, normal rate, regular rhythm  Abdomen: soft, non-tender, non-distended, normoactive bowel sounds massively obese pannus with drainage  Extremities: severe edema of both legs  Skin: no rash  Neuro: awake, sleepy, disoriented       Diagnostic Data:  Lab: I have personally reviewed pertinent lab results.,   CBC:  Results from last 7 days   Lab Units 02/04/25  0431   WBC Thousand/uL 11.66*   HEMOGLOBIN g/dL 8.7*   HEMATOCRIT % 32.2*   PLATELETS Thousands/uL 382      CMP:   Lab Results   Component Value Date    SODIUM 139 02/04/2025    K 4.1 02/04/2025     02/04/2025    CO2 33 (H) 02/04/2025    BUN 41 (H) 02/04/2025    CREATININE 1.66 (H) 02/04/2025    CALCIUM 8.4 02/04/2025    EGFR 30 02/04/2025   ,   PT/INR:   Lab Results   Component Value Date    INR 2.43 (H) 02/04/2025   ,   Magnesium: No components found for: \"MAG\",  Phosphorous: No results found for: \"PHOS\"    Microbiology:  @[email or web address],(urinecx:7)@        ORACIO Scott    Portions of the record may have been created with voice recognition software. Occasional wrong word or " "\"sound a like\" substitutions may have occurred due to the inherent limitations of voice recognition software. Read the chart carefully and recognize, using context, where substitutions have occurred.      "

## 2025-02-04 NOTE — ASSESSMENT & PLAN NOTE
Patient asx. This is not an etiology for patients waxing and waning mental status. Urine cx with K. Pneumoniae,. E. Coli, E. Faecalis, and alpha hemolytic strep. She has received 9 days of IV cefepime and vanco.   Discontinue all abx  Discuss urinary colonization with pt   Address alternative sources for AMS

## 2025-02-04 NOTE — QUICK NOTE
.AM-PAC Basic Mobility:  Basic Mobility Inpatient Raw Score: 6    JH-HL Achieved: 2: Bed activities/Dependent transfer  -HL Goal: 2: Bed activities/Dependent transfer     Patient is bedbound dependent meeting highest level of mobility via mobility scoring which is low at 6 with a bed mobility highest level of mobility of 8 to a bed activities only.    Patient's BMI of 80.14 weight of 205 kg patient would benefit from hospital bed with gel overlay to prevent skin breakdown and wounds.    Has been on 2 to 3 L of oxygen via nasal cannula which she is currently not on at home will evaluate via respiratory therapy and do a formal home oxygen evaluation as this may be a requirement upon discharge as well.

## 2025-02-04 NOTE — ASSESSMENT & PLAN NOTE
Admitted with abdominal pain and erythema of pannus with concern for panniculitis.  Several admissions for the same and typically due to a volume issue rather than true cellulitis.  Wound care is following.  She has several excoriations to the abdomen/pannus and flanks with red irregular rash on torso.  Rash appears to be resolving but may be result of recent antibiotic use.  No acute findings on CT A/P.  Mild leukocytosis unchanged since admission. Treated with 10 days of broad-spectrum IV antibiotics with minimal improvement. This would support noninfectious etiology and patient would benefit from ongoing diuresis and weight loss. She is afebrile, and remains hemodynamically stable without sepsis.  Stop systemic antibiotics   Continue diuresis   Close wound care follow up   Supportive care per primary team   Encourage weight loss

## 2025-02-04 NOTE — ASSESSMENT & PLAN NOTE
Due to acute cystitis see plan for same  Evidenced by change in mental status  CT head negative  Continue neurochecks  IV antibiotics for cystitis pending ID evaluation  Has remained on oxygen as well will order home oxygen evaluation to determine if this need is required upon discharge.

## 2025-02-04 NOTE — PROGRESS NOTES
Progress Note - Hospitalist   Name: Zee Kirk 74 y.o. female I MRN: [id number]  Unit/Bed#: -01 I Date of Admission: 1/26/2025   Date of Service: 2/4/2025 I Hospital Day: 8    Assessment & Plan  Panniculitis  POA  Patient with redness that started just prior to admission, right side with drainage. Caregiver packing area w/ gauze  Images reviewed in the chart  CT imaging with no obvious abscess  Patient was not candidate for panniculectomy in the past  Currently on IV cefepime and vanco   Will consult ID due to increasing procal. Area overall   Patient's procalcitonin continues to go up currently at 2.34 has been on 8 days of antibiotics  Will consult ID for effective management of panniculitis and cystitis  Continue supportive care  Wound care following    History of Clostridioides difficile infection  Will continue oral vancomycin while on antibiotics   Hypothyroid  Continue levothyroxine  Class 3 severe obesity due to excess calories with serious comorbidity and body mass index (BMI) greater than or equal to 70 in adult (HCC)  BMI 79  Healthy diet and lifestyle modification recommended   Longstanding persistent atrial fibrillation (HCC)  Currently rate controlled  Coumadin for anticoagulation  Check INR daily-therapeutic   Chronic heart failure with preserved ejection fraction (HCC)  Wt Readings from Last 3 Encounters:   01/30/25 (!) 205 kg (452 lb 6.1 oz)   01/17/25 (!) 205 kg (451 lb)   01/01/25 (!) 205 kg (451 lb 14.4 oz)     11/7/2024 TTE: LVEF 60% with normal systolic function  Outpatient regimen includes furosemide 60 mg twice daily   Patient reported she was only taking Lasix in the a.m. at home due to low blood pressures despite midodrine 5 mg 3 times daily outpatient  Initially diuresing with IV Lasix   Previous provider discussed with on-call nephrology increased midodrine to 10 mg 3 times a day to help support blood pressure for diuresis  BP acceptable range today  continue home Lasix  "regimen with hold parameters  Given new LARRY we will do a formal consult to nephrology  Continue to monitor fluid status closely  Daily weight  Intake and output monitoring  Type 2 diabetes mellitus without complication, without long-term current use of insulin ([facility])  Lab Results   Component Value Date    HGBA1C 7.2 (H) 11/06/2024       No results for input(s): \"POCGLU\" in the last 72 hours.      Blood Sugar Average: Last 72 hrs:    Patient denies any history of diabetes.  Monitor glucose levels on BMP as needed   Goals of care, counseling/discussion  Goals of care discussion initiated with the patient 1/30 and have been continuing on a daily basis  2/1 had extensive discussion with patient in the presence of her nephew Robbie, niece Rachelle, patient's sister, and their children regarding patient's current medical comorbidities and goals of care.  We discussed the fact that patient has been having difficulty at home managing her lymphedema and becoming volume overloaded due to hypotension and not being able to take Lasix as described despite being on midodrine 3 times a day.  We also discussed her other comorbidities including CHF, and her atrial fibrillation, bedbound status, etc. we talked about patient having more frequent hospitalizations regarding being volume overloaded and hypotensive and having trouble managing her diuretics.  Yesterday I introduced the concepts of palliative care and hospice and patient expressed understanding but wanted to discuss with her family.  We again discussed these options today.  Patient expressed that she is not ready to die yet, although she does understand the current situation that she is in.  She was agreeable to a hospice consult which was placed  Hospice liaison called and spoke to family.  Patient's niece who works in healthcare completely understands patient's current health status and would be in agreement with hospice, however she is going to support what ever her aunt " romels.  Patient is declining hospice at this time  Continuing disease focused care, plan to stabilize medically and discharge back home with her caretakers  Would encourage ongoing goals of care discussion given worsening creatinine and limited response to antibiotics for current infection  Change in mental status  Patient having acute mental status changes 1/31  UA positive-see plan for acute cystitis without hematuria  Transferred to [facility] 1/31 for stat head CT-negative for acute intracranial abnormality  Per discussion with nephew at patient had confusion with Oxy in the past-discontinued  Continues with occasional waxing and waning mentation, was alert and oriented x 3 on my exam, followed commands  Continue to monitor, reorient as needed   Acute cystitis without hematuria  UA positive  Acute mental status changes started 1/31 see plan for same  Patient has numerous antibiotic allergies  Patient is reporting burning with urination today  Continue antibiotic treatment with cefepime and vancomycin  Monitor urinary output   Metabolic encephalopathy  Due to acute cystitis see plan for same  Evidenced by change in mental status  CT head negative  Continue neurochecks  IV antibiotics for cystitis pending ID evaluation  Has remained on oxygen as well will order home oxygen evaluation to determine if this need is required upon discharge.  LARRY (acute kidney injury) (HCC)  Patient is on Lasix at home has been working peripherally with the team  Patient unfortunately requires midodrine 10 mg 3 times daily  Creatinine has been uptrending now at 1.66  Given patient remains treatment focused will consult nephrology    VTE Pharmacologic Prophylaxis: VTE Score: 8 High Risk (Score >/= 5) - Pharmacological DVT Prophylaxis Ordered: warfarin (Coumadin). Sequential Compression Devices Ordered.    Mobility:   Basic Mobility Inpatient Raw Score: 6  [facility] Goal: 2: Bed activities/Dependent transfer  [facility] Achieved: 2: Bed  activities/Dependent transfer  -HL Goal achieved. Continue to encourage appropriate mobility.    Patient Centered Rounds: I performed bedside rounds with nursing staff today.   Discussions with Specialists or Other Care Team Provider: Case management    Education and Discussions with Family / Patient: Updated  (niece) via phone.    Current Length of Stay: 8 day(s)  Current Patient Status: Inpatient   Certification Statement: The patient will continue to require additional inpatient hospital stay due to further recs from infectious disease and nephrology  Discharge Plan: Anticipate discharge tomorrow to home with home services.    Code Status: Level 1 - Full Code    Subjective   Patient is reporting feeling forgetful tired.  Overall made aware that her infections looks better however given clinical markers will have ID make recommendations further also nephrology.  Patient again remains treatment focused.    Objective :  Temp:  [97.8 °F (36.6 °C)-98.2 °F (36.8 °C)] 98.2 °F (36.8 °C)  HR:  [51-58] 51  BP: (108-136)/(54-73) 116/69  Resp:  [17-19] 19  SpO2:  [94 %-97 %] 95 %  O2 Device: Nasal cannula  Nasal Cannula O2 Flow Rate (L/min):  [4 L/min] 4 L/min    Body mass index is 80.14 kg/m².     Input and Output Summary (last 24 hours):     Intake/Output Summary (Last 24 hours) at 2/4/2025 1251  Last data filed at 2/4/2025 1245  Gross per 24 hour   Intake 390 ml   Output --   Net 390 ml       Physical Exam  Vitals and nursing note reviewed.   Constitutional:       General: She is not in acute distress.     Appearance: She is well-developed. She is obese.   HENT:      Head: Normocephalic and atraumatic.   Eyes:      Conjunctiva/sclera: Conjunctivae normal.   Cardiovascular:      Rate and Rhythm: Normal rate and regular rhythm.      Heart sounds: No murmur heard.  Pulmonary:      Effort: Pulmonary effort is normal. No respiratory distress.      Breath sounds: Normal breath sounds.   Abdominal:       Palpations: Abdomen is soft.      Tenderness: There is no abdominal tenderness.   Musculoskeletal:         General: No swelling.      Cervical back: Neck supple.   Skin:     General: Skin is warm and dry.      Capillary Refill: Capillary refill takes less than 2 seconds.      Comments: Pannus does not appear inflamed wounds appear to only be minimal with serous drainage   Neurological:      Mental Status: She is alert and oriented to person, place, and time. Mental status is at baseline.   Psychiatric:         Mood and Affect: Mood normal.           Lines/Drains:  Lines/Drains/Airways       Active Status       Name Placement date Placement time Site Days    External Urinary Catheter 01/30/25  1324  -- 4                            Lab Results: I have reviewed the following results:   Results from last 7 days   Lab Units 02/04/25  0431 02/02/25  0508 02/01/25  0626   WBC Thousand/uL 11.66*   < > 11.92*   HEMOGLOBIN g/dL 8.7*   < > 8.9*   HEMATOCRIT % 32.2*   < > 33.4*   PLATELETS Thousands/uL 382   < > 379   SEGS PCT %  --   --  79*   LYMPHO PCT %  --   --  10*   MONO PCT %  --   --  6   EOS PCT %  --   --  3    < > = values in this interval not displayed.     Results from last 7 days   Lab Units 02/04/25  0431 02/03/25  0614   SODIUM mmol/L 139 139   POTASSIUM mmol/L 4.1 3.9   CHLORIDE mmol/L 100 99   CO2 mmol/L 33* 32   BUN mg/dL 41* 36*   CREATININE mg/dL 1.66* 1.49*   ANION GAP mmol/L 6 8   CALCIUM mg/dL 8.4 8.2*   ALBUMIN g/dL  --  2.9*   TOTAL BILIRUBIN mg/dL  --  0.60   ALK PHOS U/L  --  72   ALT U/L  --  4*   AST U/L  --  10*   GLUCOSE RANDOM mg/dL 109 101     Results from last 7 days   Lab Units 02/04/25  0431   INR  2.43*     Results from last 7 days   Lab Units 01/30/25  0804   POC GLUCOSE mg/dl 98         Results from last 7 days   Lab Units 01/31/25  1812 01/30/25  0520   LACTIC ACID mmol/L 1.0  --    PROCALCITONIN ng/ml  --  0.08       Recent Cultures (last 7 days):   Results from last 7 days   Lab Units  01/31/25  1812   BLOOD CULTURE  No Growth at 72 hrs.  No Growth at 72 hrs.       Imaging Results Review: No pertinent imaging studies reviewed.  Other Study Results Review: No additional pertinent studies reviewed.    Last 24 Hours Medication List:     Current Facility-Administered Medications:     acetaminophen (TYLENOL) tablet 650 mg, Q4H PRN    allopurinol (ZYLOPRIM) tablet 100 mg, Daily    docusate sodium (COLACE) capsule 100 mg, BID    furosemide (LASIX) tablet 60 mg, BID    gabapentin (NEURONTIN) capsule 200 mg, TID    levothyroxine tablet 137 mcg, Daily    miconazole 2 % cream, BID    midodrine (PROAMATINE) tablet 10 mg, TID AC    ondansetron (ZOFRAN) injection 4 mg, Q6H PRN    polyethylene glycol (MIRALAX) packet 17 g, Daily PRN    senna (SENOKOT) tablet 8.6 mg, HS    vancomycin (VANCOCIN) capsule 125 mg, Q12H TEODORO    warfarin (COUMADIN) tablet 4 mg, Daily (warfarin)    Administrative Statements   Today, Patient Was Seen By: ORACIO Courtney  I have spent a total time of 55 minutes in caring for this patient on the day of the visit/encounter including Risks and benefits of tx options, Patient and family education, Counseling / Coordination of care, Documenting in the medical record, Reviewing / ordering tests, medicine, procedures  , Obtaining or reviewing history  , and Communicating with other healthcare professionals .    **Please Note: This note may have been constructed using a voice recognition system.**

## 2025-02-04 NOTE — CASE MANAGEMENT
Case Management Discharge Planning Note    Patient name Zee Kirk  Location /-01 MRN [id number]  : 1950 Date 2/3/2025       Current Admission Date: 2025  Current Admission Diagnosis:Panniculitis   Patient Active Problem List    Diagnosis Date Noted Date Diagnosed    Metabolic encephalopathy 2025     Acute cystitis without hematuria 2025     Change in mental status 2025     Goals of care, counseling/discussion 2025     Bilateral lower extremity edema 2024     Hypotension 2024     Weakness 2024     Paroxysmal A-fib (HCC) 2024     History of kidney stones 2024     Skin abnormalities 2024     Cataract, nuclear sclerotic senile, right 2024     Chronic heart failure with preserved ejection fraction ([facility]) 2024     Hypoxic episode 2024     Type 2 diabetes mellitus without complication, without long-term current use of insulin ([facility]) 2024     Long term (current) use of anticoagulants 2024     Subtherapeutic international normalized ratio (INR) 2024     Multiple open wounds 2024     Left leg pain 2023     Shingles 2023     Cellulitis- Ruled Out 2023     Longstanding persistent atrial fibrillation ([facility]) 2022     History of Clostridioides difficile infection 2022     Hypothyroid 10/03/2020     Mild episode of recurrent major depressive disorder (HCC) 10/03/2020     Idiopathic chronic 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 10/03/2020     Class 3 severe obesity due to excess calories with serious comorbidity and body mass index (BMI) greater than or equal to 70 in adult ([facility]) 10/03/2020     Panniculitis 10/03/2020     Gastroesophageal reflux disease without esophagitis 10/03/2020     Hx MRSA infection 2020     Impaired mobility 2019     Osteoarthritis of glenohumeral joint 2019     Left ovarian cyst 2017     Depression with anxiety  07/15/2017     Anemia 12/28/2016     Adjustment disorder 09/05/2013     Irritable bowel syndrome 12/04/2012     Left atrial enlargement 12/04/2012     Left ventricular hypertrophy 12/04/2012     Carpal tunnel syndrome 05/30/2012     Gout 05/30/2012     Hyperlipidemia 05/30/2012     Symptomatic menopausal or female climacteric states 05/30/2012       LOS (days): 7  Geometric Mean LOS (GMLOS) (days): 2.9  Days to GMLOS:-4.3     OBJECTIVE:  Risk of Unplanned Readmission Score: 43.52         Current admission status: Inpatient   Preferred Pharmacy:   [facility] [place] [facility] - [id number]  [address] [place] [address]  [place] [address]  Phone: [phone] Fax: [phone]    Primary Care Provider: Franklyn Caruso MD    Primary Insurance: MEDICARE  Secondary Insurance: [facility]    DISCHARGE DETAILS:       Freedom of Choice: Yes  Comments - [place] of Choice: hospice consult was placed - pt has declined hospice care-  called [facility] to make them aware no d/c today- they need 24 hrs notice & transport company needs 24 hrs to schedule a transport home                          DME Referral Provided  Referral made for DME?: No    Other Referral/Resources/Interventions Provided:  Referral Comments: [facility] - caregiver # [phone]    Would you like to participate in our [facility] service program?  : No - Declined    Treatment Team Recommendation: Home (home with Caregiver & family support - BLS)

## 2025-02-04 NOTE — ASSESSMENT & PLAN NOTE
Goals of care discussion initiated with the patient 1/30 and have been continuing on a daily basis  2/1 had extensive discussion with patient in the presence of her nephew Robbie, niece Rachelle, patient's sister, and their children regarding patient's current medical comorbidities and goals of care.  We discussed the fact that patient has been having difficulty at home managing her lymphedema and becoming volume overloaded due to hypotension and not being able to take Lasix as described despite being on midodrine 3 times a day.  We also discussed her other comorbidities including CHF, and her atrial fibrillation, bedbound status, etc. we talked about patient having more frequent hospitalizations regarding being volume overloaded and hypotensive and having trouble managing her diuretics.  Yesterday I introduced the concepts of palliative care and hospice and patient expressed understanding but wanted to discuss with her family.  We again discussed these options today.  Patient expressed that she is not ready to die yet, although she does understand the current situation that she is in.  She was agreeable to a hospice consult which was placed  Hospice liaison called and spoke to family.  Patient's niece who works in healthcare completely understands patient's current health status and would be in agreement with hospice, however she is going to support what ever her aunt wants.  Patient is declining hospice at this time  Continuing disease focused care, plan to stabilize medically and discharge back home with her caretakers  Would encourage ongoing goals of care discussion given worsening creatinine and limited response to antibiotics for current infection

## 2025-02-05 PROBLEM — R00.1 BRADYCARDIA: Status: ACTIVE | Noted: 2025-02-05

## 2025-02-05 LAB
ALBUMIN SERPL BCG-MCNC: 2.8 G/DL (ref 3.5–5)
ALP SERPL-CCNC: 59 U/L (ref 34–104)
ALT SERPL W P-5'-P-CCNC: 4 U/L (ref 7–52)
ANION GAP SERPL CALCULATED.3IONS-SCNC: 6 MMOL/L (ref 4–13)
AST SERPL W P-5'-P-CCNC: 8 U/L (ref 13–39)
BACTERIA BLD CULT: NORMAL
BACTERIA BLD CULT: NORMAL
BASOPHILS # BLD AUTO: 0.07 THOUSANDS/ÂΜL (ref 0–0.1)
BASOPHILS NFR BLD AUTO: 1 % (ref 0–1)
BILIRUB SERPL-MCNC: 0.48 MG/DL (ref 0.2–1)
BUN SERPL-MCNC: 40 MG/DL (ref 5–25)
CALCIUM ALBUM COR SERPL-MCNC: 8.5 MG/DL (ref 8.3–10.1)
CALCIUM SERPL-MCNC: 7.5 MG/DL (ref 8.4–10.2)
CHLORIDE SERPL-SCNC: 104 MMOL/L (ref 96–108)
CK SERPL-CCNC: <10 U/L (ref 26–192)
CO2 SERPL-SCNC: 31 MMOL/L (ref 21–32)
CREAT SERPL-MCNC: 1.62 MG/DL (ref 0.6–1.3)
EOSINOPHIL # BLD AUTO: 0.41 THOUSAND/ÂΜL (ref 0–0.61)
EOSINOPHIL NFR BLD AUTO: 4 % (ref 0–6)
ERYTHROCYTE [DISTWIDTH] IN BLOOD BY AUTOMATED COUNT: 17.4 % (ref 11.6–15.1)
GFR SERPL CREATININE-BSD FRML MDRD: 31 ML/MIN/1.73SQ M
GLUCOSE SERPL-MCNC: 89 MG/DL (ref 65–140)
HCT VFR BLD AUTO: 33.2 % (ref 34.8–46.1)
HGB BLD-MCNC: 9.2 G/DL (ref 11.5–15.4)
IMM GRANULOCYTES # BLD AUTO: 0.06 THOUSAND/UL (ref 0–0.2)
IMM GRANULOCYTES NFR BLD AUTO: 1 % (ref 0–2)
INR PPP: 2.67 (ref 0.85–1.19)
LYMPHOCYTES # BLD AUTO: 1.1 THOUSANDS/ÂΜL (ref 0.6–4.47)
LYMPHOCYTES NFR BLD AUTO: 10 % (ref 14–44)
MAGNESIUM SERPL-MCNC: 2 MG/DL (ref 1.9–2.7)
MCH RBC QN AUTO: 25.1 PG (ref 26.8–34.3)
MCHC RBC AUTO-ENTMCNC: 27.7 G/DL (ref 31.4–37.4)
MCV RBC AUTO: 91 FL (ref 82–98)
MONOCYTES # BLD AUTO: 0.6 THOUSAND/ÂΜL (ref 0.17–1.22)
MONOCYTES NFR BLD AUTO: 6 % (ref 4–12)
NEUTROPHILS # BLD AUTO: 8.69 THOUSANDS/ÂΜL (ref 1.85–7.62)
NEUTS SEG NFR BLD AUTO: 78 % (ref 43–75)
NRBC BLD AUTO-RTO: 0 /100 WBCS
PHOSPHATE SERPL-MCNC: 3.2 MG/DL (ref 2.3–4.1)
PLATELET # BLD AUTO: 356 THOUSANDS/UL (ref 149–390)
PMV BLD AUTO: 8.9 FL (ref 8.9–12.7)
POTASSIUM SERPL-SCNC: 3.8 MMOL/L (ref 3.5–5.3)
PROT SERPL-MCNC: 7 G/DL (ref 6.4–8.4)
PROTHROMBIN TIME: 28.6 SECONDS (ref 12.3–15)
RBC # BLD AUTO: 3.66 MILLION/UL (ref 3.81–5.12)
SODIUM SERPL-SCNC: 141 MMOL/L (ref 135–147)
WBC # BLD AUTO: 10.93 THOUSAND/UL (ref 4.31–10.16)

## 2025-02-05 PROCEDURE — 82550 ASSAY OF CK (CPK): CPT | Performed by: NURSE PRACTITIONER

## 2025-02-05 PROCEDURE — 83735 ASSAY OF MAGNESIUM: CPT | Performed by: NURSE PRACTITIONER

## 2025-02-05 PROCEDURE — 80053 COMPREHEN METABOLIC PANEL: CPT | Performed by: NURSE PRACTITIONER

## 2025-02-05 PROCEDURE — G0545 PR INHERENT VISIT TO INPT: HCPCS | Performed by: INTERNAL MEDICINE

## 2025-02-05 PROCEDURE — 99233 SBSQ HOSP IP/OBS HIGH 50: CPT

## 2025-02-05 PROCEDURE — 85025 COMPLETE CBC W/AUTO DIFF WBC: CPT | Performed by: NURSE PRACTITIONER

## 2025-02-05 PROCEDURE — 99233 SBSQ HOSP IP/OBS HIGH 50: CPT | Performed by: INTERNAL MEDICINE

## 2025-02-05 PROCEDURE — 85610 PROTHROMBIN TIME: CPT | Performed by: NURSE PRACTITIONER

## 2025-02-05 PROCEDURE — 84100 ASSAY OF PHOSPHORUS: CPT | Performed by: NURSE PRACTITIONER

## 2025-02-05 PROCEDURE — 99222 1ST HOSP IP/OBS MODERATE 55: CPT | Performed by: NURSE PRACTITIONER

## 2025-02-05 RX ORDER — LORAZEPAM 0.5 MG/1
0.5 TABLET ORAL EVERY 8 HOURS PRN
Status: DISCONTINUED | OUTPATIENT
Start: 2025-02-05 | End: 2025-02-06

## 2025-02-05 RX ADMIN — MICONAZOLE NITRATE: 20 CREAM TOPICAL at 08:59

## 2025-02-05 RX ADMIN — MICONAZOLE NITRATE: 20 CREAM TOPICAL at 17:32

## 2025-02-05 RX ADMIN — LORAZEPAM 0.5 MG: 0.5 TABLET ORAL at 18:18

## 2025-02-05 RX ADMIN — ALLOPURINOL 100 MG: 100 TABLET ORAL at 08:54

## 2025-02-05 RX ADMIN — VANCOMYCIN HYDROCHLORIDE 125 MG: 125 CAPSULE ORAL at 08:54

## 2025-02-05 RX ADMIN — MIDODRINE HYDROCHLORIDE 10 MG: 5 TABLET ORAL at 12:55

## 2025-02-05 RX ADMIN — WARFARIN SODIUM 4 MG: 2 TABLET ORAL at 17:32

## 2025-02-05 RX ADMIN — LEVOTHYROXINE SODIUM 137 MCG: 25 TABLET ORAL at 08:54

## 2025-02-05 RX ADMIN — MIDODRINE HYDROCHLORIDE 10 MG: 5 TABLET ORAL at 08:54

## 2025-02-05 RX ADMIN — SENNOSIDES 8.6 MG: 8.6 TABLET, FILM COATED ORAL at 21:38

## 2025-02-05 RX ADMIN — VANCOMYCIN HYDROCHLORIDE 125 MG: 125 CAPSULE ORAL at 21:38

## 2025-02-05 RX ADMIN — GABAPENTIN 100 MG: 100 CAPSULE ORAL at 08:54

## 2025-02-05 RX ADMIN — MIDODRINE HYDROCHLORIDE 10 MG: 5 TABLET ORAL at 17:32

## 2025-02-05 RX ADMIN — GABAPENTIN 100 MG: 100 CAPSULE ORAL at 17:32

## 2025-02-05 RX ADMIN — GABAPENTIN 100 MG: 100 CAPSULE ORAL at 21:38

## 2025-02-05 NOTE — HOSPICE NOTE
Received message that family is now agreeable to hospice. Patient is approved for [facility] hospice (at home or SNF). I reached out to malini Bush and she stated patient will need placement. I updated NABEEL Mora. Will continue to follow patient.

## 2025-02-05 NOTE — CONSULTS
Vancomycin IV Pharmacy-to-Dose Consultation     Vancomycin has been discontinued.  Pharmacy will sign off.  Please contact or re-consult with questions.    Kevin Hicks, Pharmacist

## 2025-02-05 NOTE — ASSESSMENT & PLAN NOTE
Lab Results   Component Value Date    HGBA1C 7.2 (H) 11/06/2024       Recent Labs     02/04/25  2131   POCGLU 121         Blood Sugar Average: Last 72 hrs:  (P) 121  Patient denies any history of diabetes.  Monitor glucose levels on BMP as needed

## 2025-02-05 NOTE — ASSESSMENT & PLAN NOTE
LVEF 60%, noncompliant with lasix 60mg BID due to low blood pressures in the outpatient setting. Midodrine dose increased. Was diuresed initially with IV lasix and now back on PO lasix with hold parameters.  She remains on 3 to 4 L nasal cannula and I am uncertain if this is her baseline.  May require ongoing diuresis.

## 2025-02-05 NOTE — PROGRESS NOTES
Progress Note - Infectious Disease   Name: Zee Kirk 74 y.o. female I MRN: [id number]  Unit/Bed#: [facility] I Date of Admission: 1/26/2025   Date of Service: 2/5/2025 I Hospital Day: 9    Assessment & Plan  Panniculitis  Admitted with abdominal pain and erythema of pannus with concern for panniculitis.  Several admissions for the same and typically due to a volume issue rather than true cellulitis.  Wound care is following.  She has several excoriations to the abdomen/pannus and flanks with red irregular rash on torso.  Rash appears to be resolving but may be result of recent antibiotic use.  No acute findings on CT A/P.  Mild leukocytosis unchanged since admission. Treated with 10 days of broad-spectrum IV antibiotics with minimal improvement. This would support noninfectious etiology and patient would benefit from ongoing diuresis and weight loss. She is afebrile, and remains hemodynamically stable without sepsis.  Monitor off systemic antibiotics   Continue diuresis   Close wound care follow up   Supportive care per primary team   Encourage weight loss   Acute cystitis without hematuria  Patient asx. This is not an etiology for patients waxing and waning mental status. Urine cx with K. Pneumoniae,. E. Coli, E. Faecalis, and alpha hemolytic strep. She has received 9 days of IV cefepime and vanco.   Monitor off abx  Discussed urinary colonization with pt   Address alternative sources for AMS   History of Clostridioides difficile infection  Despite having hx of c diff, patient has remains on 9 days of IV cefepime and vancomycin. Fortunately patient his not having diarrhea. Given current prolonged course of abx, she remains a risk for developing C diff infection.   Discontinued systemic abx yesterday  Continue PO vanco for another 48 hours   Monitor for diarrhea   LARRY (acute kidney injury) (HCC)  Cr peaked at 1.66 and nephrology consulted. Unknown etiology. No recent episodes of hypotension. Possibly due  "to volume overload. Consider ATN.  Diuretic holiday per nephrology   Trend daily chem   Class 3 severe obesity due to excess calories with serious comorbidity and body mass index (BMI) greater than or equal to 70 in adult ([facility])  BMI 80. Patient severely debilitated by her morbid obesity. Recommend diet, weight loss and follow up with PCP.  Chronic heart failure with preserved ejection fraction ([facility])  LVEF 60%, noncompliant with lasix 60mg BID due to low blood pressures in the outpatient setting. Midodrine dose increased. Was diuresed initially with IV lasix and now back on PO lasix with hold parameters.  She remains on 3 to 4 L nasal cannula and I am uncertain if this is her baseline.  May require ongoing diuresis.   Type 2 diabetes mellitus without complication, without long-term current use of insulin ([facility])  HA1C 7.2. Remains a risk factor for infection. Recommend tight glycemic control.     I have discussed the above management plan in detail with the primary service.     Antibiotics:  D1 off abx     Subjective   Patient states \" I feel out of it.\" Moved from upstairs overnight. States she did not sleep well.     Objective :  Temp:  [97.6 °F (36.4 °C)-97.9 °F (36.6 °C)] 97.6 °F (36.4 °C)  HR:  [42-61] 50  BP: (113-133)/(47-72) 113/47  Resp:  [15-20] 18  SpO2:  [93 %-99 %] 96 %    Physical exam:   General:  No acute distress, laying in bariatric bed, chronically ill-appearing elderly female, lethargic and falling asleep  Psychiatric:  Awake and alert, tangential and forgetful  HEENT: Mucous membranes moist, neck supple, head normocephalic  CV: Distant heart sounds due to body habitus  Pulmonary: Decreased breath sounds due to body habitus, on 3 to 4 L  Abdomen: Morbidly obese.  Large pannus with chronic skin changes and discoloration without cellulitis, warmth.    Extremities: Chronic edema of extremities  Skin: Dry erythematous rash on torso, now mostly to upper chest.  Clinical media reviewed of chronic " pannicular skin discoloration and excoriations  : Amos patent      Lab Results: I have reviewed the following results:  Results from last 7 days   Lab Units 02/05/25  0610 02/04/25 0431 02/03/25 0614   WBC Thousand/uL 10.93* 11.66* 11.84*   HEMOGLOBIN g/dL 9.2* 8.7* 8.7*   PLATELETS Thousands/uL 356 382 399*     Results from last 7 days   Lab Units 02/05/25  0610 02/04/25 0431 02/03/25  0614 02/01/25  0626 01/31/25 1812   SODIUM mmol/L 141 139 139   < > 137   POTASSIUM mmol/L 3.8 4.1 3.9   < > 3.9   CHLORIDE mmol/L 104 100 99   < > 95*   CO2 mmol/L 31 33* 32   < > 36*   BUN mg/dL 40* 41* 36*   < > 30*   CREATININE mg/dL 1.62* 1.66* 1.49*   < > 1.39*   EGFR ml/min/1.73sq m 31 30 34   < > 37   CALCIUM mg/dL 7.5* 8.4 8.2*   < > 8.0*   AST U/L 8*  --  10*  --  10*   ALT U/L 4*  --  4*  --  4*   ALK PHOS U/L 59  --  72  --  80   ALBUMIN g/dL 2.8*  --  2.9*  --  3.1*    < > = values in this interval not displayed.     Results from last 7 days   Lab Units 01/31/25 1814 01/31/25 1812   BLOOD CULTURE   --  No Growth After 4 Days.  No Growth After 4 Days.   MRSA CULTURE ONLY  No Methicillin Resistant Staphlyococcus aureus (MRSA) isolated  --      Results from last 7 days   Lab Units 01/30/25  0520   PROCALCITONIN ng/ml 0.08                 Imaging Results Review: No pertinent imaging studies reviewed.  Other Study Results Review: Other studies reviewed include: micro

## 2025-02-05 NOTE — ASSESSMENT & PLAN NOTE
Lab Results   Component Value Date    EGFR 31 02/05/2025    EGFR 30 02/04/2025    EGFR 34 02/03/2025    CREATININE 1.62 (H) 02/05/2025    CREATININE 1.66 (H) 02/04/2025    CREATININE 1.49 (H) 02/03/2025   Baseline creatinine appears to be around 1.1-1.2 mg/dL.  Atrophic kidneys via CT scan done earlier in admission.

## 2025-02-05 NOTE — ASSESSMENT & PLAN NOTE
Peak creatinine today 1.66 mg/dL.  Etiology presumed intravascular volume contraction in setting of weeping wound, frequent stools.  Volume status is very difficult to ascertain given body habitus BMI of 80.  There is edema however she is hypoalbuminemic and immobile.  I/O are not accurate-Per nursing, she cannot use pure wick and has large amounts of incontinent urine.  Also noted to have a rash which is potentially due to antibiotics per infectious disease specialist-query AIN.     2/5  Intravascular volume status is very difficult to ascertain given super morbid obesity, body habitus and chronic edema. Patient appears confused and ROS limited. Received albumin 2/4  with diuretic holiday. Currently on midodrine for BP support. Cr trends stable at 1.6.   Discussed care with primary, would not advise diuretic addition at this time, volume status entirely unclear.   Bradycardia being addressed with cardiology.   Encourage GOC discussion. Long term prognosis appears poor.

## 2025-02-05 NOTE — ASSESSMENT & PLAN NOTE
Currently A-fib heart rate 50s  Monitoring on telemetry  Appreciate input of cardiology  Coumadin for anticoagulation  Check INR daily-therapeutic

## 2025-02-05 NOTE — ASSESSMENT & PLAN NOTE
Cr peaked at 1.66 and nephrology consulted. Unknown etiology. No recent episodes of hypotension. Possibly due to volume overload. Consider ATN.  Diuretic holiday per nephrology   Trend daily chem

## 2025-02-05 NOTE — QUICK NOTE
Notified by nursing HR 40-50s on bran. Will bring to 2nd floor and monitor on telemetry. Cardiology consult. Patient has episodes of confusion, also times yelling at staff. May benefit from psych consult

## 2025-02-05 NOTE — NURSING NOTE
"Pt arrived to floor. Alert and oriented to only self. Pt opens eyes when saying name. Pt with repetitive speech, PCA introduced self and now pt repeating \"April April April..\" Explained to patient she will be on a telemetry monitor for heart. Pt states \"I am telemetry, I am telemetry..\" continuously. When asking the year pt states \"I am 2000\". Pt unable to answer if she's having any pain or shortness of breath. HR 40s on telemetry.   "

## 2025-02-05 NOTE — ASSESSMENT & PLAN NOTE
Patient having acute mental status changes 1/31  UA positive-see plan for acute cystitis without hematuria  Transferred to [facility] 1/31 for stat head CT-negative for acute intracranial abnormality  Per discussion with nephew at patient had confusion with Oxy in the past-discontinued  Continues with occasional waxing and waning mentation  Reorient as needed  See goals of care discussion

## 2025-02-05 NOTE — PROGRESS NOTES
Progress Note - Hospitalist   Name: Zee Kirk 74 y.o. female I MRN: [id number]  Unit/Bed#: MS 210[facility]o I Date of Admission: 1/26/2025   Date of Service: 2/5/2025 I Hospital Day: 9    Assessment & Plan  Panniculitis  POA  Patient with redness that started just prior to admission, right side with drainage. Caregiver packing area w/ gauze  Images reviewed in the chart  CT imaging with no obvious abscess  Patient was not candidate for panniculectomy in the past  S/P cefepime and vancomycin  Appreciate input of ID, currently monitoring off of antibiotics  Continue supportive care  History of Clostridioides difficile infection  Will continue oral vancomycin while on antibiotics   Hypothyroid  Continue levothyroxine  Class 3 severe obesity due to excess calories with serious comorbidity and body mass index (BMI) greater than or equal to 70 in adult (HCC)  BMI 79  Healthy diet and lifestyle modification recommended   Longstanding persistent atrial fibrillation (HCC)  Currently A-fib heart rate 50s  Monitoring on telemetry  Appreciate input of cardiology  Coumadin for anticoagulation  Check INR daily-therapeutic   Chronic heart failure with preserved ejection fraction (HCC)  Wt Readings from Last 3 Encounters:   02/05/25 (!) 206 kg (455 lb 0.5 oz)   01/17/25 (!) 205 kg (451 lb)   01/01/25 (!) 205 kg (451 lb 14.4 oz)     11/7/2024 TTE: LVEF 60% with normal systolic function  Outpatient regimen includes furosemide 60 mg twice daily   Patient reported she was only taking Lasix in the a.m. at home due to low blood pressures despite midodrine 5 mg 3 times daily outpatient  Initially diuresing with IV Lasix, then transitioned to oral  Given LARRY nephrology was consulted, appreciate input  Diuretics are currently on hold  Appreciate input of cardiology  Continue to monitor fluid status closely  Daily weight  Intake and output monitoring  See goals of care discussion  Type 2 diabetes mellitus without complication, without  long-term current use of insulin ([facility])  Lab Results   Component Value Date    HGBA1C 7.2 (H) 11/06/2024       Recent Labs     02/04/25  2131   POCGLU 121         Blood Sugar Average: Last 72 hrs:  (P) 121  Patient denies any history of diabetes.  Monitor glucose levels on BMP as needed   Goals of care, counseling/discussion  Goals of care discussion initiated with the patient 1/30 and have been continuing on a daily basis  2/1 had extensive discussion with patient in the presence of her nephew Robbie, niece Rachelle, patient's sister, and their children regarding patient's current medical comorbidities and goals of care.  We discussed the fact that patient has been having difficulty at home managing her lymphedema and becoming volume overloaded due to hypotension and not being able to take Lasix as described despite being on midodrine 3 times a day.  We also discussed her other comorbidities including CHF, and her atrial fibrillation, bedbound status, etc. we talked about patient having more frequent hospitalizations regarding being volume overloaded and hypotensive and having trouble managing her diuretics.  Yesterday I introduced the concepts of palliative care and hospice and patient expressed understanding but wanted to discuss with her family.  We again discussed these options today.  Patient expressed that she is not ready to die yet, although she does understand the current situation that she is in.  She was agreeable to a hospice consult which was placed  2/1 Hospice liaison called and spoke to family.  Patient's niece who works in healthcare completely understands patient's current health status and would be in agreement with hospice, however she is going to support what ever her aunt wants.  Patient is declining hospice at this time  Continuing disease focused care, plan to stabilize medically and discharge back home with her caretakers  2/5 spoke with patient's niece regarding goals of care.  She is aware  that patient is now confused and would not be able to make her own medical decisions.  Also aware of bradycardia with rates as low as 38 currently.  She is leaving work and coming to do further goals of care discussion.  Nephbobby Avalos will also be coming.  Change in mental status  Patient having acute mental status changes 1/31  UA positive-see plan for acute cystitis without hematuria  Transferred to [facility] 1/31 for stat head CT-negative for acute intracranial abnormality  Per discussion with nephew at patient had confusion with Oxy in the past-discontinued  Continues with occasional waxing and waning mentation  Reorient as needed  See goals of care discussion  Acute cystitis without hematuria  UA positive  Acute mental status changes started 1/31 see plan for same  Patient has numerous antibiotic allergies  S/P treatment with cefepime and vancomycin, now discontinued monitoring off of antibiotics  Appreciate input of ID who are following  Monitor urinary output   Metabolic encephalopathy  Due to acute cystitis see plan for same  Evidenced by change in mental status  CT head negative  Continue neurochecks  IV antibiotics on hold per ID recommendations  Continues with waxing and waning mentation  See goals of care discussion  Case management continues to follow.  Oxygen was arranged for discharge which is currently on hold  LARRY (acute kidney injury) (HCC)  Patient is on Lasix at home has been working peripherally with the team  Patient unfortunately requires midodrine 10 mg 3 times daily  Appreciate input of nephrology  Ongoing goals of care discussion-see plan to proceed  Hypotension  Blood pressure results reviewed   appreciate input of cardiology  Continue midodrine  Monitor BP per protocol  Stage 3a chronic kidney disease (HCC)  Lab Results   Component Value Date    EGFR 31 02/05/2025    EGFR 30 02/04/2025    EGFR 34 02/03/2025    CREATININE 1.62 (H) 02/05/2025    CREATININE 1.66 (H) 02/04/2025     CREATININE 1.49 (H) 02/03/2025     Appreciate input of nephrology who are following  Lasix is currently on hold  Monitoring fluid status closely  BMP a.m.  See goals of care discussion  Bradycardia  Patient noted to have bradycardia overnight with heart rate in the 40s transferred to telemetry  Afib with slow ventricular response heart rate 50s on monitor  Appreciate input of cardiology  Ongoing goals of care discussion-see plan for same    VTE Pharmacologic Prophylaxis: VTE Score: 8 High Risk (Score >/= 5) - Pharmacological DVT Prophylaxis Ordered: warfarin (Coumadin). Sequential Compression Devices Ordered.    Mobility:   Basic Mobility Inpatient Raw Score: 6  JH-HLM Goal: 2: Bed activities/Dependent transfer  JH-HLM Achieved: 2: Bed activities/Dependent transfer  JH-HLM Goal achieved. Continue to encourage appropriate mobility.    Patient Centered Rounds: I performed bedside rounds with nursing staff today.   Discussions with Specialists or Other Care Team Provider: Cardiology, nephrology, nursing, case management  Education and Discussions with Family / Patient: Updated  (niece) via phone.    Current Length of Stay: 9 day(s)  Current Patient Status: Inpatient   Certification Statement: The patient will continue to require additional inpatient hospital stay due to coordination of care-ongoing goals of care discussion family coming within half hour to discuss further goals of care, now bradycardic, encephalopathic      Discharge Plan: Patient started with bradycardia overnight noted to be in A-fib with heart rate in the 40s, transferred to telemetry.  This a.m. she is oriented to self only.  Discussed with patient's niece at heart rate has been as low as 38, and that patient would not have capacity to make her own decisions at this time and is still full code.  Patient's niece and nephew are coming within the half hour to further discuss goals of care.      Code Status: Level 1 - Full  Code    Subjective   Oriented to self only.  Denies pain when asked.    Objective :  Temp:  [97.6 °F (36.4 °C)-97.9 °F (36.6 °C)] 97.6 °F (36.4 °C)  HR:  [42-61] 50  BP: (113-133)/(47-72) 113/47  Resp:  [15-20] 18  SpO2:  [93 %-99 %] 96 %    Body mass index is 80.6 kg/m².     Input and Output Summary (last 24 hours):     Intake/Output Summary (Last 24 hours) at 2/5/2025 1206  Last data filed at 2/5/2025 0630  Gross per 24 hour   Intake 240 ml   Output --   Net 240 ml       Physical Exam  Vitals reviewed.   Constitutional:       General: She is not in acute distress.     Appearance: She is well-developed. She is obese. She is ill-appearing.   HENT:      Head: Normocephalic and atraumatic.   Cardiovascular:      Rate and Rhythm: Bradycardia present. Rhythm irregular.      Pulses: Normal pulses.      Heart sounds: Normal heart sounds. No murmur heard.  Pulmonary:      Effort: Pulmonary effort is normal. No respiratory distress.      Breath sounds: No wheezing or rales.      Comments: Nonlabored respirations at rest on O2 4 L nasal cannula, decreased breath sounds bilateral bases, no cough or shortness of breath  Abdominal:      General: There is no distension.      Palpations: Abdomen is soft.      Tenderness: There is no abdominal tenderness. There is no guarding.   Musculoskeletal:         General: Swelling present.      Right lower leg: Edema present.      Left lower leg: Edema present.   Skin:     General: Skin is warm and dry.      Capillary Refill: Capillary refill takes less than 2 seconds.   Neurological:      Mental Status: She is alert. She is disoriented.           Lines/Drains:                Lab Results: I have reviewed the following results:   Results from last 7 days   Lab Units 02/05/25  0610   WBC Thousand/uL 10.93*   HEMOGLOBIN g/dL 9.2*   HEMATOCRIT % 33.2*   PLATELETS Thousands/uL 356   SEGS PCT % 78*   LYMPHO PCT % 10*   MONO PCT % 6   EOS PCT % 4     Results from last 7 days   Lab Units  02/05/25  0610   SODIUM mmol/L 141   POTASSIUM mmol/L 3.8   CHLORIDE mmol/L 104   CO2 mmol/L 31   BUN mg/dL 40*   CREATININE mg/dL 1.62*   ANION GAP mmol/L 6   CALCIUM mg/dL 7.5*   ALBUMIN g/dL 2.8*   TOTAL BILIRUBIN mg/dL 0.48   ALK PHOS U/L 59   ALT U/L 4*   AST U/L 8*   GLUCOSE RANDOM mg/dL 89     Results from last 7 days   Lab Units 02/05/25  0610   INR  2.67*     Results from last 7 days   Lab Units 02/04/25  2131 01/30/25  0804   POC GLUCOSE mg/dl 121 98         Results from last 7 days   Lab Units 01/31/25  1812 01/30/25  0520   LACTIC ACID mmol/L 1.0  --    PROCALCITONIN ng/ml  --  0.08       Recent Cultures (last 7 days):   Results from last 7 days   Lab Units 01/31/25  1812   BLOOD CULTURE  No Growth After 4 Days.  No Growth After 4 Days.       Imaging Results Review: I reviewed radiology reports from this admission including: CT abdomen pelvis, CT head.  Other Study Results Review: No additional pertinent studies reviewed.    Last 24 Hours Medication List:     Current Facility-Administered Medications:     acetaminophen (TYLENOL) tablet 650 mg, Q4H PRN    allopurinol (ZYLOPRIM) tablet 100 mg, Daily    docusate sodium (COLACE) capsule 100 mg, BID    gabapentin (NEURONTIN) capsule 100 mg, TID    levothyroxine tablet 137 mcg, Daily    miconazole 2 % cream, BID    midodrine (PROAMATINE) tablet 10 mg, TID AC    ondansetron (ZOFRAN) injection 4 mg, Q6H PRN    polyethylene glycol (MIRALAX) packet 17 g, Daily PRN    senna (SENOKOT) tablet 8.6 mg, HS    vancomycin (VANCOCIN) capsule 125 mg, Q12H TEODORO    warfarin (COUMADIN) tablet 4 mg, Daily (warfarin)    Administrative Statements   Today, Patient Was Seen By: ORACIO Last  I have spent a total time of 40 minutes in caring for this patient on the day of the visit/encounter including Patient and family education, Documenting in the medical record, Reviewing / ordering tests, medicine, procedures  , Communicating with other healthcare professionals , and  ongoing goals of care discussion.    **Please Note: This note may have been constructed using a voice recognition system.**

## 2025-02-05 NOTE — ASSESSMENT & PLAN NOTE
Due to acute cystitis see plan for same  Evidenced by change in mental status  CT head negative  Continue neurochecks  IV antibiotics on hold per ID recommendations  Continues with waxing and waning mentation  See goals of care discussion  Case management continues to follow.  Oxygen was arranged for discharge which is currently on hold

## 2025-02-05 NOTE — ASSESSMENT & PLAN NOTE
Blood pressure results reviewed   appreciate input of cardiology  Continue midodrine  Monitor BP per protocol

## 2025-02-05 NOTE — ASSESSMENT & PLAN NOTE
Lab Results   Component Value Date    EGFR 31 02/05/2025    EGFR 30 02/04/2025    EGFR 34 02/03/2025    CREATININE 1.62 (H) 02/05/2025    CREATININE 1.66 (H) 02/04/2025    CREATININE 1.49 (H) 02/03/2025   Nephrology following

## 2025-02-05 NOTE — ASSESSMENT & PLAN NOTE
Lab Results   Component Value Date    EGFR 31 02/05/2025    EGFR 30 02/04/2025    EGFR 34 02/03/2025    CREATININE 1.62 (H) 02/05/2025    CREATININE 1.66 (H) 02/04/2025    CREATININE 1.49 (H) 02/03/2025     Appreciate input of nephrology who are following  Lasix is currently on hold  Monitoring fluid status closely  BMP a.m.  See goals of care discussion

## 2025-02-05 NOTE — ASSESSMENT & PLAN NOTE
Patient is on Lasix at home has been working peripherally with the team  Patient unfortunately requires midodrine 10 mg 3 times daily  Appreciate input of nephrology  Ongoing goals of care discussion-see plan to proceed

## 2025-02-05 NOTE — PROGRESS NOTES
Pastoral Care Progress Note  CH in consult with physician initiated support visit to pt in setting of ongoing goals of care discussion. Pt received CH reclined in bed, no family present. Pt shared that she feels confused and did need some reorientation during consult.    Pt shared that she is afraid to die, both the process and the finality. 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 Pt shared repeatedly about living to see her nephew play for the [employer], but would not cooperate with redirection or questions asking about alternate ways to celebrate her family's accomplishments and enjoy them.    CH attempted redirection and discussion, pt preoccupied most of consult.  CH remains available.        Chaplaincy Interventions Utilized:   Empowerment: Provided anxiety containment and Provided grief counseling    Exploration: Explored alternatives    Collaboration: Consulted with interdisciplinary team    Chaplaincy Outcomes Achieved:  Identified meaningful connections    Spiritual Coping Strategies Utilized:   Connectedness       02/05/25 1100   Clinical Encounter Type   Visited With Patient

## 2025-02-05 NOTE — ASSESSMENT & PLAN NOTE
POA  Patient with redness that started just prior to admission, right side with drainage. Caregiver packing area w/ gauze  Images reviewed in the chart  CT imaging with no obvious abscess  Patient was not candidate for panniculectomy in the past  S/P cefepime and vancomycin  Appreciate input of ID, currently monitoring off of antibiotics  Continue supportive care

## 2025-02-05 NOTE — PROGRESS NOTES
Pastoral Care Progress Note  Ch provided follow up support visit in setting of GOC conversation. Pt received CH reclined in bed, niece Rachelle present.  Pt shared that she is afraid to die, which she has shared repeatedly with staff this week. Pt discussed her concerns at length.  CH facilitated conversation with pt about her theological belief about the afterlife and how to gain forgiveness for her perceived sins. Pt expressed desire to confess to a  and receive last rites.    CH called Fr. Diaz and relayed the request. Fr. Diaz bedside 1515 to work with pt.  CH remains available.        Chaplaincy Outcomes Achieved:  Arranged for community clergy surrogate

## 2025-02-05 NOTE — CONSULTS
Consultation - Cardiology   Name: Zee Kirk 74 y.o. female I MRN: [id number]  Unit/Bed#: -01 I Date of Admission: 1/26/2025   Date of Service: 2/5/2025 I Hospital Day: 9   Inpatient consult to Cardiology  Consult performed by: ORACIO Pineda  Consult ordered by: Anjelica Dey PA-C        Physician Requesting Evaluation: Yane Verdugo, *   Reason for Evaluation / Principal Problem: bradycardia  Assessment & Plan  Panniculitis  Primary reason for presentation/admission  Hypothyroid  Per SLIM  Longstanding persistent atrial fibrillation (HCC)  Slow ventricular response  Not maintained on any rate controlling medications  Continue warfarin for AC, goal INR 2-3  Chronic heart failure with preserved ejection fraction (HCC)  Wt Readings from Last 3 Encounters:   02/05/25 (!) 206 kg (455 lb 0.5 oz)   01/17/25 (!) 205 kg (451 lb)   01/01/25 (!) 205 kg (451 lb 14.4 oz)     Difficult to assess volume status, but lungs clear, no signs of resp distress  Continue home medications  Hypotension  BP stable  Goals of care, counseling/discussion  Ongoing per SLIM  Stage 3a chronic kidney disease (HCC)  Lab Results   Component Value Date    EGFR 31 02/05/2025    EGFR 30 02/04/2025    EGFR 34 02/03/2025    CREATININE 1.62 (H) 02/05/2025    CREATININE 1.66 (H) 02/04/2025    CREATININE 1.49 (H) 02/03/2025   Nephrology following  Bradycardia  Afib with slow ventricular response  Asymptomatic  Consider correction of abnl TSH which may be contributing     Other summary comments:   Cardiology consulted for bradycardia.  Review of telemetry shows afib with slow ventricular response.  Difficult to assess symptoms, though appears comfortable and does not report CP or SOB.      She is not maintained on rate controlling medications.  Note that TSH was abnl 1/17/25 labs and is possibly a contributing factor--defer to SLIM.    No need for additional treatment unless symptoms present.  I also note  "ongoing GOC/hospice discussions with pt and POA.      Cardiology will sign off.  We will remain available if needed.        Outpatient Cardiologist: not followed in the outpatient setting    HPI: Zee Kirk is a 74 y.o. year old female who was admitted to the medical service on 25 with cellulitis/panniculitis. She has been followed by ID and was treated with antibiotics.     Last evening, she was noted to have a HR in the 40's-50's on bran.  For this reason, cardiology was consulted.  She was transferred to [facility] for additional monitoring.  She was also noted to have increased confusion.  This was discussed with the [facility] provider who reports that her mental status has been waxing and waning.    At the time of my evaluation, Zee is comfortably resting in bed, no acute distress.  She is not able to tell me her name or where she is.  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    According to chart review, she has a history of persistent afib, maintained on coumadin for AC and HFpEF.    EK2025 Afib slow ventricular response, low voltage QRS, cannot exclude anterior infarct, age undetermined      MOST  RECENT CARDIAC IMAGING:   Echo 2024  EF 60%  Mildly dilated RV with mildly reduced systolic pressure  Mild LAE  Mild MR    Echo 2024  EF 55%  Mild ANDREEA    Review of Systems: unable to assess due to confusion      Historical Information   Past Medical History:   Diagnosis Date    Atrial fibrillation (HCC)     Bladder stone     C. difficile colitis     Cataract, nuclear sclerotic senile, right 2024    Cellulitis     CHF (congestive heart failure) (HCC)     Depression with anxiety     Disease of thyroid gland     Diverticulitis     Enterocolitis     History of abnormal cervical Pap smear     Kidney stone     Lymphedema     Menopause     Age 50    Morbid obesity with BMI of 70 and over, adult (HCC)     MRSA (methicillin resistant " Staphylococcus aureus)     abdominal wound    Osteoarthritis     Phlebitis     left lower leg    Sleep apnea     Spondylolysis      Past Surgical History:   Procedure Laterality Date    APPENDECTOMY      CARPAL TUNNEL RELEASE      CATARACT EXTRACTION Right     CHOLECYSTECTOMY      CYSTOSCOPY      stent    EGD      FOOT SURGERY      bone spur    KNEE SURGERY      [place] ICAPSULAR CATARACT XTRJ INSJ IO LENS PRSTH 1 STG Right 2024    Procedure: CATARACT EXTRACTION WITH IOL IMPLANTATION;  Surgeon: Chantel Llanos MD;  Location: [facility];  Service: Ophthalmology    WISDOM TOOTH EXTRACTION       Social History     Substance and Sexual Activity   Alcohol Use Not Currently     Social History     Substance and Sexual Activity   Drug Use Not Currently    Types: Marijuana    Comment: As a teen - As per [employer]      Social History     Tobacco Use   Smoking Status Former    Current packs/day: 0.00    Average packs/day: 5.0 packs/day for 3.0 years (15.0 ttl pk-yrs)    Types: Cigarettes    Start date: 1967    Quit date: 1970    Years since quittin.6   Smokeless Tobacco Never   Tobacco Comments    5 packs/day until  (age [age])        Family History: significant for heart disease in multiple family members      Meds/Allergies   all current active meds have been reviewed    Medications Prior to Admission:     acetaminophen (TYLENOL) 650 mg CR tablet    allopurinol (ZYLOPRIM) 100 mg tablet    levothyroxine 125 mcg tablet    midodrine (PROAMATINE) 5 mg tablet    neomycin-bacitracin-polymyxin (NEOSPORIN) 5-400-5,000 ointment    triamcinolone (KENALOG) 0.5 % cream    Alcohol Swabs 70 % PADS    benzonatate (TESSALON PERLES) 100 mg capsule    Blood Glucose Monitoring Suppl (OneTouch Verio Reflect) w/Device KIT    furosemide (LASIX) 40 mg tablet    gabapentin (NEURONTIN) 100 mg capsule    glucose blood (OneTouch Verio) test strip    OneTouch Delica Lancets 33G MISC    prednisoLONE acetate (PRED FORTE) 1 %  "ophthalmic suspension    tobramycin (TOBREX) 0.3 % SOLN    warfarin (Coumadin) 5 mg tablet    Allergies   Allergen Reactions    Augmentin Es-600 [Amoxicillin-Pot Clavulanate] Anaphylaxis and Rash    Bactrim [Sulfamethoxazole-Trimethoprim] Anaphylaxis    Cefazolin Itching and Other (See Comments)     Patient developed itching and difficulty swallowing [facility] treatment with benadryl and epinephrine - has tolerated other cephalosporins with different side chains including cefepime, cefuroxime, and cephalexin    Ciprofloxacin Anaphylaxis    Doxycycline Anaphylaxis    Keflex [Cephalexin] Anaphylaxis    Penicillins Anaphylaxis, Rash and Other (See Comments)     Tolerates cefepime (21)    Morphine Delirium    Other Rash, Irritability and Edema     Patient reports significant reaction to the use of Max Orb in the abdominal folds leading to swelling, excoriation, maceration and weeping of the skin.     Omeprazole GI Intolerance    Oxycontin [Oxycodone] Other (See Comments)     Agitation, extreme confusion    Pork-Derived Products - Food Allergy Diarrhea    Sulfa Antibiotics Hives    Latex Rash and Edema    Vitamin C [Ascorbate - Food Allergy] Rash       Objective   Vitals: Blood pressure (!) 113/47, pulse (!) 50, temperature 97.6 °F (36.4 °C), temperature source Tympanic, resp. rate 18, height 5' 3\" (1.6 m), weight (!) 206 kg (455 lb 0.5 oz), SpO2 96%., Body mass index is 80.6 kg/m².,   Orthostatic Blood Pressures      Flowsheet Row Most Recent Value   Blood Pressure 113/47 filed at 2025 0717   Patient Position - Orthostatic VS Sitting filed at 2025 1215            Systolic (24hrs), Av , Min:113 , Max:133     Diastolic (24hrs), Av, Min:47, Max:72      Physical Exam:    General:  Normal appearance in no distress.  Eyes:  Anicteric.  Oral mucosa:  Moist.  Neck:  unable to assess due to body habitus  Chest:  Clear to auscultation.  Cardiac: "  Irregularly irregular.  No palpable PMI.  Normal S1 and S2.  No murmur gallop or rub.  Abdomen:  Obese, soft and nontender.   Extremities:  Difficult to quantify given body habitus  Musculoskeletal:  Symmetric.   Vascular:  Pedal pulses are intact.  Neuro:  Grossly symmetric.  Psych:  Alert, confused      Lab Results:   Labs reviewed and prominent abnormalities reviewed above and/or below.    Troponins:      BNP:   Results from last 6 Months   Lab Units 12/29/24  1211 12/11/24  1752   BNP pg/mL 231* 242*

## 2025-02-05 NOTE — CASE MANAGEMENT
Case Management Discharge Planning Note    Patient name Zee Kirk  Location /-01 MRN [id number]  : 1950 Date 2025       Current Admission Date: 2025  Current Admission Diagnosis:Panniculitis   Patient Active Problem List    Diagnosis Date Noted Date Diagnosed    Stage 3a chronic kidney disease (HCC) 2025     Metabolic encephalopathy 2025     Acute cystitis without hematuria 2025     Change in mental status 2025     Goals of care, counseling/discussion 2025     Bilateral lower extremity edema 2024     Hypotension 2024     Weakness 2024     Paroxysmal A-fib (Formerly [facility]) 2024     History of kidney stones 2024     Skin abnormalities 2024     Cataract, nuclear sclerotic senile, right 2024     Chronic heart failure with preserved ejection fraction (Formerly [facility]) 2024     Hypoxic episode 2024     Type 2 diabetes mellitus without complication, without long-term current use of insulin (Formerly [facility]) 2024     Long term (current) use of anticoagulants 2024     Subtherapeutic international normalized ratio (INR) 2024     Multiple open wounds 2024     Left leg pain 2023     Shingles 2023     Cellulitis- Ruled Out 2023     Longstanding persistent atrial fibrillation (Formerly [facility]) 2022     History of Clostridioides difficile infection 2022     Hypothyroid 10/03/2020     Mild episode of recurrent major depressive disorder (Formerly [facility]) 10/03/2020     Idiopathic chronic 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 10/03/2020     Class 3 severe obesity due to excess calories with serious comorbidity and body mass index (BMI) greater than or equal to 70 in adult (Formerly [facility]) 10/03/2020     Panniculitis 10/03/2020     Gastroesophageal reflux disease without esophagitis 10/03/2020     Hx MRSA infection 2020     Impaired mobility 2019     Osteoarthritis of glenohumeral joint 2019     Left  ovarian cyst 09/20/2017     Depression with anxiety 07/15/2017     Anemia 12/28/2016     LARRY (acute kidney injury) (HCC) 12/27/2016     Adjustment disorder 09/05/2013     Irritable bowel syndrome 12/04/2012     Left atrial enlargement 12/04/2012     Left ventricular hypertrophy 12/04/2012     Carpal tunnel syndrome 05/30/2012     Gout 05/30/2012     Hyperlipidemia 05/30/2012     Symptomatic menopausal or female climacteric states 05/30/2012       LOS (days): 8  Geometric Mean LOS (GMLOS) (days): 4.4  Days to GMLOS:-3.8     OBJECTIVE:  Risk of Unplanned Readmission Score: 39.31         Current admission status: Inpatient   Preferred Pharmacy:   [facility] [place] - [address]  [address] [place] [address]  [place] [id number]  Phone: [phone] Fax: [phone]    Primary Care Provider: Franklyn Caruso MD    Primary Insurance: MEDICARE  Secondary Insurance: AARP    DISCHARGE DETAILS:    Discharge planning discussed with:: patient & Rachelle was called at 10:57 am & spoke with her at 15:34 pm  Freedom of Choice: Yes  Comments - Freedom of Choice: pt has declined hospice care- pt instructed cm to order her a newhospital bed from [facility] and oxygen from them - [facility]- cm send an order for a new bariatric hospital bed and oxygen( pt had a desat study and she did qualify)  CM contacted family/caregiver?: Yes             Contacts  Patient Contacts: Heatehr  Relationship to Patient:: Family (niece)  Contact Method: Phone  Phone Number: [phone]  Reason/Outcome: Discharge Planning    Requested Home Health Care         Is the patient interested in HHC at discharge?: No    DME Referral Provided  Referral made for DME?: Yes  DME referral completed for the following items:: Home Oxygen concentrator, Portable Oxygen tanks, Hospital Bed  [facility]    Other Referral/Resources/Interventions Provided:  Interventions: DME, Other  (Specify)  Referral Comments: tom[facility]ul[facility] (caregiver agency )  [phone] they were called at 12:39 spoke to Dustin & 12:46 pm & 15:13pm & 16:11 pm - Fay her aide will go to the pt's  home at 12:30pm to  receive the new hospital bed or they will fix her bed and deliver the oxygen & equipment  14:00pm s [facility] transport set up    Would you like to participate in our [facility] service program?  : No - Declined    Treatment Team Recommendation: Home (home with family support & Caregiver & outpt follow up - 14:00pm BLS)     Transport at Discharge : [facility]     Number/Name of Dispatcher: [phone]  Transported by (Company and Unit #): [email or web address]  ETA of Transport (Date): 02/04/25  ETA of Transport (Time): 1400              IMM Given (Date):: 02/04/25  IMM Given to:: Patient  Family notified:: niece was called  Additional Comments: cm did send a referral to [place] for a hospital  bed & oxygen - pt had a desat study and she did qualify for home oxygen- cm was requested to call Rocio from [place] # [phone] wilfred called back and zi spoke merari Zhang and she statedthatthey are cx the order- they stated it is not good fo rthe pt to split [employer] companies- cm spoke with Rachelle and made her aware that the pt wanted [facility] and I am not able touse them - [facility]-  cm did receive a call from Sage at [facility] - I made him aware I need to send the pt home BLS and I have to arrange her caregiver to be at the home prior to the d/c - caregiver will be at the home at 12:30pm fordelivery of the bed and oxygen - Sage stated he would let the delivery team know

## 2025-02-05 NOTE — ASSESSMENT & PLAN NOTE
Wt Readings from Last 3 Encounters:   02/05/25 (!) 206 kg (455 lb 0.5 oz)   01/17/25 (!) 205 kg (451 lb)   01/01/25 (!) 205 kg (451 lb 14.4 oz)     11/7/2024 TTE: LVEF 60% with normal systolic function  Outpatient regimen includes furosemide 60 mg twice daily   Patient reported she was only taking Lasix in the a.m. at home due to low blood pressures despite midodrine 5 mg 3 times daily outpatient  Initially diuresing with IV Lasix, then transitioned to oral  Given LARRY nephrology was consulted, appreciate input  Diuretics are currently on hold  Appreciate input of cardiology  Continue to monitor fluid status closely  Daily weight  Intake and output monitoring  See goals of care discussion

## 2025-02-05 NOTE — ASSESSMENT & PLAN NOTE
Patient asx. This is not an etiology for patients waxing and waning mental status. Urine cx with K. Pneumoniae,. E. Coli, E. Faecalis, and alpha hemolytic strep. She has received 9 days of IV cefepime and vanco.   Monitor off abx  Discussed urinary colonization with pt   Address alternative sources for AMS

## 2025-02-05 NOTE — ASSESSMENT & PLAN NOTE
Afib with slow ventricular response  Asymptomatic  Consider correction of abnl TSH which may be contributing

## 2025-02-05 NOTE — ASSESSMENT & PLAN NOTE
UA positive  Acute mental status changes started 1/31 see plan for same  Patient has numerous antibiotic allergies  S/P treatment with cefepime and vancomycin, now discontinued monitoring off of antibiotics  Appreciate input of ID who are following  Monitor urinary output

## 2025-02-05 NOTE — ASSESSMENT & PLAN NOTE
Patient noted to have bradycardia overnight with heart rate in the 40s transferred to telemetry  Afib with slow ventricular response heart rate 50s on monitor  Appreciate input of cardiology  Ongoing goals of care discussion-see plan for same

## 2025-02-05 NOTE — WOUND OSTOMY CARE
Progress Note - Wound   Zee Kirk 74 y.o. female MRN: [id number]  Unit/Bed#: -01 Encounter: [id number]      Assessment :   Patient admitted to [facility] due to panniculitis. History of a-fib., CHF, lymphedema, sleep apnea. Wound care nurse follow-up for abdominal wounds. Patient is agreeable to assessment, patient was alert to self as start of assessment and then  become alert and oriented x3 by end of exam, incontinent of bowel and bladder, assist of 3-4 to turn for assessment, ATR in use for turning and repositioning, on a bariatric low air loss mattress, is a moderate assist with care. Nutrition is following.        1. Bilateral sacrum, buttock, elbows, and heels- Skin is dry, intact, blanchable.      2. Left axilla MASD/ITD- Wound on assessment has resolved, skin is dry, intact, blanchable.      3. Left breast skin fold MASD/ITD- Wounds are scattered, linear in shape, partial thickness, partial thickness, 100% pink tissue, with scant amount of serosanguineous drainage noted. Alison-wounds are intact, dry, mild redness.     4. Right flank MASD/ITD- Wound on assessment has resolved, skin is dry, intact, blanchable.      5. Left abdominal pannus MASD/ITD- Wound on assessment has resolved, skin is dry, intact, blanchable.      6. Left lateral thigh- Wound on assessment has resolved, skin is dry, intact, blanchable. There is an area of dry well adhered brown scabbing, no drainage noted.      7. Right abdominal pannus- Wounds are scattered, linear in shape, partial thickness, 100% pink tissue, with scant amount of serosanguineous drainage noted. Alison-wound is dry, intact, mild redness/rash.      8. Bilateral medial thigh skin folds MASD/ITD- Wounds are linear in shape, partial thickness, 100% pink tissue, with scant amount of serosanguineous drainage noted. Alison-wounds are dry, intact, blanchable, no redness.     9. Bilateral posterior knee skin folds MASD/ITD- Wounds are linear in shape, partial thickness,  100% pink tissue, with scant amount of serous drainage noted. Alison-wounds are macerated, intact, no redness.     10. Right lateral thigh- Unclear etiology, suspect traumatic skin tears. Wounds are scattered oval and in shape, partial thickness, 100% pink tissue with small amount of serosanguineous drainage noted. Alison-wounds are fragile with purple blanchable skin.     Educated patient on importance of frequent offloading of pressure via turning, repositioning, and weight-shifting.     No induration, fluctuance, or purulence noted to the above noted wound. New dressings applied. Patient tolerated well, reports mild to moderate pain to the wounds. Primary nurse aware of plan of care. See flow sheets for more detailed assessment findings. Will follow along.     Skin care plans:  1-Hydraguard/Silicone Cream to bilateral sacrum, buttock, and heels BID and PRN  2-Elevate heels to offload pressure.  3-Ehob cushion in chair when out of bed.  4-Moisturize skin daily with skin nourishing cream.  5-Turn/reposition q2h for pressure re-distribution on skin.   6- B/L Axilla, Breast skin folds, right abdominal pannus, B/L medial thigh skin folds, and posterior knee skin folds- Cleanse wounds with soap and water, pat dry. Apply miconazole cream/ointment and then cover with ABD pad. Change 1-2 times a day or as needed for soilage/displacement.   7-Right lateral thigh- Cleanse wounds with NSS, pat dry. Apply Silicone foam dressing over wound beds. Change every other day and as needed for soilage/displacement.   8-Bariatric low air loss mattress.      Wound 11/07/24 Breast Lateral;Left (Active)   Wound Image   02/05/25 1043   Wound Description Pink 02/05/25 1043   Alison-wound Assessment Dry;Intact 02/05/25 1043   Wound Length (cm) 0.1 cm 02/05/25 1043   Wound Width (cm) 5.5 cm 02/05/25 1043   Wound Depth (cm) 0.1 cm 02/05/25 1043   Wound Surface Area (cm^2) 0.55 cm^2 02/05/25 1043   Wound Volume (cm^3) 0.055 cm^3 02/05/25 1043    Calculated Wound Volume (cm^3) 0.06 cm^3 02/05/25 1043   Change in Wound Size % 98.13 02/05/25 1043   Drainage Amount Scant 02/05/25 1043   Drainage Description Serosanguineous 02/05/25 1043   Non-staged Wound Description Partial thickness 02/05/25 1043   Treatments Cleansed;Site care 02/05/25 1043   Dressing ABD 02/05/25 1043   Wound packed? No 02/05/25 1043   Dressing Changed New 02/05/25 1043   Patient Tolerance Tolerated well 02/05/25 1043   Dressing Status Clean;Dry;Intact 02/05/25 1043       Wound 12/11/24 Axilla Left (Active)   Wound Image   02/05/25 1042   Wound Description Dry;Intact 02/05/25 1042   Alison-wound Assessment Dry;Intact 02/05/25 1042   Wound Length (cm) 0 cm 02/05/25 1042   Wound Width (cm) 0 cm 02/05/25 1042   Wound Depth (cm) 0 cm 02/05/25 1042   Wound Surface Area (cm^2) 0 cm^2 02/05/25 1042   Wound Volume (cm^3) 0 cm^3 02/05/25 1042   Calculated Wound Volume (cm^3) 0 cm^3 02/05/25 1042   Change in Wound Size % 100 02/05/25 1042       Wound 12/30/24 MASD Pelvis Anterior;Right (Active)   Wound Image   02/05/25 1052   Wound Description Pink 02/05/25 1052   Alison-wound Assessment Dry;Intact 02/05/25 1052   Wound Length (cm) 0.3 cm 02/05/25 1052   Wound Width (cm) 21.5 cm 02/05/25 1052   Wound Depth (cm) 0.1 cm 02/05/25 1052   Wound Surface Area (cm^2) 6.45 cm^2 02/05/25 1052   Wound Volume (cm^3) 0.645 cm^3 02/05/25 1052   Calculated Wound Volume (cm^3) 0.65 cm^3 02/05/25 1052   Change in Wound Size % -2066.67 02/05/25 1052   Drainage Amount Scant 02/05/25 1052   Drainage Description Serosanguineous 02/05/25 1052   Non-staged Wound Description Partial thickness 02/05/25 1052   Treatments Site care;Cleansed 02/05/25 1052   Dressing ABD 02/05/25 1052   Wound packed? No 02/05/25 1052   Dressing Changed New 02/05/25 1052   Patient Tolerance Tolerated well 02/05/25 1052   Dressing Status Clean;Dry;Intact 02/05/25 1052       Wound 01/29/25 Knee Posterior;Right (Active)   Wound Image   02/05/25 1051    Wound Description [place] 02/05/25 1051   Alison-wound Assessment Dry;Intact 02/05/25 1051   Wound Length (cm) 0.5 cm 02/05/25 1051   Wound Width (cm) 0.7 cm 02/05/25 1051   Wound Depth (cm) 0.1 cm 02/05/25 1051   Wound Surface Area (cm^2) 0.35 cm^2 02/05/25 1051   Wound Volume (cm^3) 0.035 cm^3 02/05/25 1051   Calculated Wound Volume (cm^3) 0.04 cm^3 02/05/25 1051   Change in Wound Size % 66.67 02/05/25 1051   Drainage Amount Scant 02/05/25 1051   Drainage Description Serosanguineous 02/05/25 1051   Non-staged Wound Description Partial thickness 02/05/25 1051   Treatments Cleansed;Site care 02/05/25 1051   Dressing ABD 02/05/25 1051   Wound packed? No 02/05/25 1051   Dressing Changed Changed 02/05/25 1051   Patient Tolerance Tolerated well 02/05/25 1051   Dressing Status Dry;Clean;Intact 02/05/25 1051       Wound 01/29/25 Knee Left;Posterior (Active)   Wound Image   02/05/25 1050   Wound Description Dry;Intact 02/05/25 1050   Alison-wound Assessment Dry;Intact 02/05/25 1050   Wound Length (cm) 0 cm 02/05/25 1050   Wound Width (cm) 0 cm 02/05/25 1050   Wound Depth (cm) 0 cm 02/05/25 1050   Wound Surface Area (cm^2) 0 cm^2 02/05/25 1050   Wound Volume (cm^3) 0 cm^3 02/05/25 1050   Calculated Wound Volume (cm^3) 0 cm^3 02/05/25 1050   Change in Wound Size % 100 02/05/25 1050       Wound 01/29/25 Pelvis Anterior;Left (Active)   Wound Image   02/05/25 1046   Wound Description Dry;Intact 02/05/25 1046   Alison-wound Assessment Dry;Intact 02/05/25 1046   Wound Length (cm) 0 cm 02/05/25 1046   Wound Width (cm) 0 cm 02/05/25 1046   Wound Depth (cm) 0 cm 02/05/25 1046   Wound Surface Area (cm^2) 0 cm^2 02/05/25 1046   Wound Volume (cm^3) 0 cm^3 02/05/25 1046   Calculated Wound Volume (cm^3) 0 cm^3 02/05/25 1046   Change in Wound Size % 100 02/05/25 1046       Wound 01/29/25 Flank Right;Lower (Active)   Wound Image   02/05/25 1058   Wound Description Dry;Intact 02/05/25 1058   Alison-wound Assessment Dry;Intact 02/05/25 1058   Wound  Length (cm) 0 cm 02/05/25 1058   Wound Width (cm) 0 cm 02/05/25 1058   Wound Depth (cm) 0 cm 02/05/25 1058   Wound Surface Area (cm^2) 0 cm^2 02/05/25 1058   Wound Volume (cm^3) 0 cm^3 02/05/25 1058   Calculated Wound Volume (cm^3) 0 cm^3 02/05/25 1058   Change in Wound Size % 100 02/05/25 1058       Wound 01/29/25 Thigh Left;Lateral (Active)   Wound Image   02/05/25 1047   Wound Description Dry;Brown;Other (Comment) 02/05/25 1047        Wound Length (cm) 0 cm 02/05/25 1047   Wound Width (cm) 0 cm 02/05/25 1047   Wound Depth (cm) 0 cm 02/05/25 1047   Wound Surface Area (cm^2) 0 cm^2 02/05/25 1047   Wound Volume (cm^3) 0 cm^3 02/05/25 1047   Calculated Wound Volume (cm^3) 0 cm^3 02/05/25 1047   Change in Wound Size % 100 02/05/25 1047   Drainage Amount None 02/05/25 1047        Non-staged Wound Description Not applicable 02/05/25 1047   Treatments Cleansed;Site care 02/05/25 1047   Dressing Moisture barrier 02/05/25 1047   Wound packed? No 02/05/25 1047   Dressing Changed New 02/05/25 1047   Patient Tolerance Tolerated well 02/05/25 1047            Wound 02/05/25 Thigh Anterior;Left;Proximal;Medial (Active)   Wound Image   02/05/25 1047   Wound Description Pink 02/05/25 1047   Alison-wound Assessment Dry;Intact 02/05/25 1047   Wound Length (cm) 1 cm 02/05/25 1047   Wound Width (cm) 13 cm 02/05/25 1047   Wound Depth (cm) 0.1 cm 02/05/25 1047   Wound Surface Area (cm^2) 13 cm^2 02/05/25 1047   Wound Volume (cm^3) 1.3 cm^3 02/05/25 1047   Calculated Wound Volume (cm^3) 1.3 cm^3 02/05/25 1047   Drainage Amount Scant 02/05/25 1047   Drainage Description Serosanguineous 02/05/25 1047   Non-staged Wound Description Partial thickness 02/05/25 1047   Treatments Cleansed;Site care 02/05/25 1047   Dressing ABD 02/05/25 1047   Wound packed? No 02/05/25 1047   Dressing Changed New 02/05/25 1047   Patient Tolerance Tolerated well 02/05/25 1047   Dressing Status Clean;Dry;Intact 02/05/25 1047       Wound 02/05/25 Thigh  Anterior;Right;Medial (Active)   Wound Image   02/05/25 1049   Wound Description Pink 02/05/25 1049   Alison-wound Assessment Dry;Intact 02/05/25 1049   Wound Length (cm) 2 cm 02/05/25 1049   Wound Width (cm) 1.5 cm 02/05/25 1049   Wound Depth (cm) 0.1 cm 02/05/25 1049   Wound Surface Area (cm^2) 3 cm^2 02/05/25 1049   Wound Volume (cm^3) 0.3 cm^3 02/05/25 1049   Calculated Wound Volume (cm^3) 0.3 cm^3 02/05/25 1049   Drainage Amount Scant 02/05/25 1049   Drainage Description Serosanguineous 02/05/25 1049   Non-staged Wound Description Partial thickness 02/05/25 1049   Treatments Cleansed;Site care 02/05/25 1049   Dressing ABD 02/05/25 1049   Wound packed? No 02/05/25 1049   Dressing Changed New 02/05/25 1049   Patient Tolerance Tolerated well 02/05/25 1049   Dressing Status Clean;Dry;Intact 02/05/25 1049       Wound 02/05/25 Thigh Right;Lateral (Active)   Wound Image   02/05/25 1057   Wound Description Pink 02/05/25 1057   Alison-wound Assessment Dry;Intact;Fragile;Purple 02/05/25 1057   Wound Length (cm) 5.2 cm 02/05/25 1057   Wound Width (cm) 7 cm 02/05/25 1057   Wound Depth (cm) 0.1 cm 02/05/25 1057   Wound Surface Area (cm^2) 36.4 cm^2 02/05/25 1057   Wound Volume (cm^3) 3.64 cm^3 02/05/25 1057   Calculated Wound Volume (cm^3) 3.64 cm^3 02/05/25 1057   Drainage Amount Small 02/05/25 1057   Drainage Description Serosanguineous 02/05/25 1057   Non-staged Wound Description Partial thickness 02/05/25 1057   Treatments Cleansed;Irrigation with NSS;Site care 02/05/25 1057   Dressing Foam, Silicon (eg. Allevyn, etc) 02/05/25 1057   Wound packed? No 02/05/25 1057   Dressing Changed Changed 02/05/25 1057   Patient Tolerance Tolerated well 02/05/25 1057   Dressing Status Clean;Dry;Intact 02/05/25 1057     Contact through [employer] Secure Chat with any questions  Wound Care will continue to follow while inpatient    Melissa ELIZONDON RN CWON  Wound and Ostomy care

## 2025-02-05 NOTE — ASSESSMENT & PLAN NOTE
Admitted with abdominal pain and erythema of pannus with concern for panniculitis.  Several admissions for the same and typically due to a volume issue rather than true cellulitis.  Wound care is following.  She has several excoriations to the abdomen/pannus and flanks with red irregular rash on torso.  Rash appears to be resolving but may be result of recent antibiotic use.  No acute findings on CT A/P.  Mild leukocytosis unchanged since admission. Treated with 10 days of broad-spectrum IV antibiotics with minimal improvement. This would support noninfectious etiology and patient would benefit from ongoing diuresis and weight loss. She is afebrile, and remains hemodynamically stable without sepsis.  Monitor off systemic antibiotics   Continue diuresis   Close wound care follow up   Supportive care per primary team   Encourage weight loss

## 2025-02-05 NOTE — ASSESSMENT & PLAN NOTE
Wt Readings from Last 3 Encounters:   02/05/25 (!) 206 kg (455 lb 0.5 oz)   01/17/25 (!) 205 kg (451 lb)   01/01/25 (!) 205 kg (451 lb 14.4 oz)   Diuretic holiday and follow response.  Daily weight and strict I's/O ordered today by me.

## 2025-02-05 NOTE — ASSESSMENT & PLAN NOTE
Despite having hx of c diff, patient has remains on 9 days of IV cefepime and vancomycin. Fortunately patient his not having diarrhea. Given current prolonged course of abx, she remains a risk for developing C diff infection.   Discontinued systemic abx yesterday  Continue PO vanco for another 48 hours   Monitor for diarrhea

## 2025-02-05 NOTE — ASSESSMENT & PLAN NOTE
Goals of care discussion initiated with the patient 1/30 and have been continuing on a daily basis  2/1 had extensive discussion with patient in the presence of her nephew Robbie, niece Rachelle, patient's sister, and their children regarding patient's current medical comorbidities and goals of care.  We discussed the fact that patient has been having difficulty at home managing her lymphedema and becoming volume overloaded due to hypotension and not being able to take Lasix as described despite being on midodrine 3 times a day.  We also discussed her other comorbidities including CHF, and her atrial fibrillation, bedbound status, etc. we talked about patient having more frequent hospitalizations regarding being volume overloaded and hypotensive and having trouble managing her diuretics.  Yesterday I introduced the concepts of palliative care and hospice and patient expressed understanding but wanted to discuss with her family.  We again discussed these options today.  Patient expressed that she is not ready to die yet, although she does understand the current situation that she is in.  She was agreeable to a hospice consult which was placed  2/1 Hospice liaison called and spoke to family.  Patient's niece who works in healthcare completely understands patient's current health status and would be in agreement with hospice, however she is going to support what ever her aunt wants.  Patient is declining hospice at this time  Continuing disease focused care, plan to stabilize medically and discharge back home with her caretakers  2/5 spoke with patient's niece regarding goals of care.  She is aware that patient is now confused and would not be able to make her own medical decisions.  Also aware of bradycardia with rates as low as 38 currently.  She is leaving work and coming to do further goals of care discussion.  Nephew Robbie will also be coming.

## 2025-02-05 NOTE — ASSESSMENT & PLAN NOTE
Slow ventricular response  Not maintained on any rate controlling medications  Continue warfarin for AC, goal INR 2-3

## 2025-02-05 NOTE — ASSESSMENT & PLAN NOTE
Wt Readings from Last 3 Encounters:   02/05/25 (!) 206 kg (455 lb 0.5 oz)   01/17/25 (!) 205 kg (451 lb)   01/01/25 (!) 205 kg (451 lb 14.4 oz)     Difficult to assess volume status, but lungs clear, no signs of resp distress  Continue home medications

## 2025-02-06 PROBLEM — Z51.5 COMFORT MEASURES ONLY STATUS: Status: ACTIVE | Noted: 2025-02-06

## 2025-02-06 LAB
INR PPP: 2.8 (ref 0.85–1.19)
PROTHROMBIN TIME: 29.7 SECONDS (ref 12.3–15)

## 2025-02-06 PROCEDURE — 85610 PROTHROMBIN TIME: CPT | Performed by: NURSE PRACTITIONER

## 2025-02-06 PROCEDURE — 99233 SBSQ HOSP IP/OBS HIGH 50: CPT

## 2025-02-06 RX ORDER — LORAZEPAM 0.5 MG/1
0.5 TABLET ORAL
Status: DISCONTINUED | OUTPATIENT
Start: 2025-02-06 | End: 2025-02-09

## 2025-02-06 RX ADMIN — MIDODRINE HYDROCHLORIDE 10 MG: 5 TABLET ORAL at 06:23

## 2025-02-06 RX ADMIN — LORAZEPAM 0.5 MG: 0.5 TABLET ORAL at 05:01

## 2025-02-06 NOTE — PLAN OF CARE
Problem: Prexisting or High Potential for Compromised Skin Integrity  Goal: Skin integrity is maintained or improved  Description: INTERVENTIONS:  - Identify patients at risk for skin breakdown  - Assess and monitor skin integrity  - Assess and monitor nutrition and hydration status  - Monitor labs   - Assess for incontinence   - Turn and reposition patient  - Assist with mobility/ambulation  - Relieve pressure over bony prominences  - Avoid friction and shearing  - Provide appropriate hygiene as needed including keeping skin clean and dry  - Evaluate need for skin moisturizer/barrier cream  - Collaborate with interdisciplinary team   - Patient/family teaching  - Consider wound care consult   Outcome: Progressing     Problem: PAIN - ADULT  Goal: Verbalizes/displays adequate comfort level or baseline comfort level  Description: Interventions:  - Encourage patient to monitor pain and request assistance  - Assess pain using appropriate pain scale  - Administer analgesics based on type and severity of pain and evaluate response  - Implement non-pharmacological measures as appropriate and evaluate response  - Consider cultural and social influences on pain and pain management  - Notify physician/advanced practitioner if interventions unsuccessful or patient reports new pain  Outcome: Progressing     Problem: INFECTION - ADULT  Goal: Absence or prevention of progression during hospitalization  Description: INTERVENTIONS:  - Assess and monitor for signs and symptoms of infection  - Monitor lab/diagnostic results  - Monitor all insertion sites, i.e. indwelling lines, tubes, and drains  - Monitor endotracheal if appropriate and nasal secretions for changes in amount and color  - [place] appropriate cooling/warming therapies per order  - Administer medications as ordered  - Instruct and encourage patient and family to use good hand hygiene technique  - Identify and instruct in appropriate isolation precautions for  identified infection/condition  Outcome: Progressing  Goal: Absence of fever/infection during neutropenic period  Description: INTERVENTIONS:  - Monitor WBC    Outcome: Progressing     Problem: SAFETY ADULT  Goal: Patient will remain free of falls  Description: INTERVENTIONS:  - Educate patient/family on patient safety including physical limitations  - Instruct patient to call for assistance with activity   - Consult OT/PT to assist with strengthening/mobility   - Keep Call bell within reach  - Keep bed low and locked with side rails adjusted as appropriate  - Keep care items and personal belongings within reach  - Initiate and maintain comfort rounds  - Make Fall Risk Sign visible to staff  - Offer Toileting every 2 Hours, in advance of need  - Initiate/Maintain bed alarm  - Obtain necessary fall risk management equipment: yellow socks  - Apply yellow socks and bracelet for high fall risk patients  - Consider moving patient to room near nurses station  Outcome: Progressing  Goal: Maintain or return to baseline ADL function  Description: INTERVENTIONS:  -  Assess patient's ability to carry out ADLs; assess patient's baseline for ADL function and identify physical deficits which impact ability to perform ADLs (bathing, care of mouth/teeth, toileting, grooming, dressing, etc.)  - Assess/evaluate cause of self-care deficits   - Assess range of motion  - Assess patient's mobility; develop plan if impaired  - Assess patient's need for assistive devices and provide as appropriate  - Encourage maximum independence but intervene and supervise when necessary  - Involve family in performance of ADLs  - Assess for home care needs following discharge   - Consider OT consult to assist with ADL evaluation and planning for discharge  - Provide patient education as appropriate  Outcome: Progressing  Goal: Maintains/Returns to pre admission functional level  Description: INTERVENTIONS:  - Perform AM-PAC 6 Click Basic Mobility/ Daily  Activity assessment daily.  - Set and communicate daily mobility goal to care team and patient/family/caregiver.   - Collaborate with rehabilitation services on mobility goals if consulted  - Perform Range of Motion 3 times a day.  - Reposition patient every 2 hours.  - Dangle patient 3 times a day  - Stand patient 3 times a day  - Ambulate patient 3 times a day  - Out of bed to chair 3 times a day   - Out of bed for meals 3 times a day  - Out of bed for toileting  - Record patient progress and toleration of activity level   Outcome: Progressing     Problem: Knowledge Deficit  Goal: Patient/family/caregiver demonstrates understanding of disease process, treatment plan, medications, and discharge instructions  Description: Complete learning assessment and assess knowledge base.  Interventions:  - Provide teaching at level of understanding  - Provide teaching via preferred learning methods  Outcome: Progressing     Problem: GASTROINTESTINAL - ADULT  Goal: Minimal or absence of nausea and/or vomiting  Description: INTERVENTIONS:  - Administer IV fluids if ordered to ensure adequate hydration  - Maintain NPO status until nausea and vomiting are resolved  - Nasogastric tube if ordered  - Administer ordered antiemetic medications as needed  - Provide nonpharmacologic comfort measures as appropriate  - Advance diet as tolerated, if ordered  - Consider nutrition services referral to assist patient with adequate nutrition and appropriate food choices  Outcome: Progressing  Goal: Maintains or returns to baseline bowel function  Description: INTERVENTIONS:  - Assess bowel function  - Encourage oral fluids to ensure adequate hydration  - Administer IV fluids if ordered to ensure adequate hydration  - Administer ordered medications as needed  - Encourage mobilization and activity  - Consider nutritional services referral to assist patient with adequate nutrition and appropriate food choices  Outcome: Progressing  Goal: Maintains  adequate nutritional intake  Description: INTERVENTIONS:  - Monitor percentage of each meal consumed  - Identify factors contributing to decreased intake, treat as appropriate  - Assist with meals as needed  - Monitor I&O, weight, and lab values if indicated  - Obtain nutrition services referral as needed  Outcome: Progressing  Goal: Establish and maintain optimal ostomy function  Description: INTERVENTIONS:  - Assess bowel function  - Encourage oral fluids to ensure adequate hydration  - Administer IV fluids if ordered to ensure adequate hydration   - Administer ordered medications as needed  - Encourage mobilization and activity  - Nutrition services referral to assist patient with appropriate food choices  - Assess stoma site  - Consider wound care consult   Outcome: Progressing  Goal: Oral mucous membranes remain intact  Description: INTERVENTIONS  - Assess oral mucosa and hygiene practices  - Implement preventative oral hygiene regimen  - Implement oral medicated treatments as ordered  - Initiate Nutrition services referral as needed  Outcome: Progressing     Problem: Nutrition/Hydration-ADULT  Goal: Nutrient/Hydration intake appropriate for improving, restoring or maintaining nutritional needs  Description: Monitor and assess patient's nutrition/hydration status for malnutrition. Collaborate with interdisciplinary team and initiate plan and interventions as ordered.  Monitor patient's weight and dietary intake as ordered or per policy. Utilize nutrition screening tool and intervene as necessary. Determine patient's food preferences and provide high-protein, high-caloric foods as appropriate.     INTERVENTIONS:  - Monitor oral intake, urinary output, labs, and treatment plans  - Assess nutrition and hydration status and recommend course of action  - Evaluate amount of meals eaten  - Assist patient with eating if necessary   - Allow adequate time for meals  - Recommend/ encourage appropriate diets, oral  nutritional supplements, and vitamin/mineral supplements  - Order, calculate, and assess calorie counts as needed  - Recommend, monitor, and adjust tube feedings and TPN/PPN based on assessed needs  - Assess need for intravenous fluids  - Provide specific nutrition/hydration education as appropriate  - Include patient/family/caregiver in decisions related to nutrition  Outcome: Progressing     Problem: Activity Intolerance  Goal: Patient is able to perform activities within their limitations  Description: INTERVENTIONS:                       -   Alternate periods of activity with periods of rest                 -   Patients is able to maintain normal vitals heart rhythm during activity                 -   Gradually increase activity and exercise as patient can tolerate                 -   Monitor blood pressure and heart before and after exercise                  -   Monitor blood oxygen saturation during activity and apply oxygen as needed    Outcome: Progressing     Problem: Excess Fluid Volume  Goal: Patient is able to achieve and maintain homeostasis  Description: INTERVENTIONS: Monitor for sign and symptoms of fluid overload  - Evaluate LE edema every shift  - Elevate LE to prevent dependent edema  - Apply ALONDRA stockings as ordered   - Monitor ankle circumference daily  - Assess for jugular vein distention  - Evaluate provider orders for the CHF diuretic algorithm. Administer diuretics as ordered  - Weigh the patient daily at 0600 and report a weight gain of five pounds or more   - Strict intake and output  - Monitor fluid intake and adhere to fluid restrictions  - Assess lung sounds every shift and as needed  - Monitor vital signs and lab values (CBC, chem, BUN, BNP)  - Measure and document urine output    Outcome: Progressing     Problem: Impaired Gas Exchange  Goal: Optimize oxygenation and ensure adequate ventilation  Description: INTERVENTIONS: Monitor for signs and symptoms of respiratory distress                 - Elevate HOB or use high fowlers to promote lung expansion                - Administer oxygen as ordered to maintain adequate oxygenation                - Encourage use of IS to promote lung expansion and prevent PN                - Monitor ABGs to assess oxygenation status                - Monitor blood oxygen level to maintain adequate oxygenation                - Encourage cough and deep breathing exercises to promote lung expansion                - Monitor patient's mental status for increased confusion    Outcome: Progressing     Problem: DISCHARGE PLANNING  Goal: Discharge to home or other facility with appropriate resources  Description: INTERVENTIONS:  - Identify barriers to discharge w/patient and caregiver  - Arrange for needed discharge resources and transportation as appropriate  - Identify discharge learning needs (meds, wound care, etc.)  - Arrange for interpretive services to assist at discharge as needed  - Refer to Case Management Department for coordinating discharge planning if the patient needs post-hospital services based on physician/advanced practitioner order or complex needs related to functional status, cognitive ability, or social support system  Outcome: Progressing

## 2025-02-06 NOTE — ASSESSMENT & PLAN NOTE
UA positive  Acute mental status changes started 1/31 see plan for same  Patient has numerous antibiotic allergies  S/P treatment with cefepime and vancomycin, monitored off of antibiotics since 2/4  Appreciate input of ID   Patient is being transitioned to comfort care as of today

## 2025-02-06 NOTE — ASSESSMENT & PLAN NOTE
POA  Patient with redness that started just prior to admission, right side with drainage. Caregiver packing area w/ gauze  Images reviewed in the chart  CT imaging with no obvious abscess  Patient was not candidate for panniculectomy in the past  S/P cefepime and vancomycin  Appreciate input of ID, currently monitoring off of antibiotics  Continue supportive care  See goals of care discussion  Transition to full comfort measures today

## 2025-02-06 NOTE — ASSESSMENT & PLAN NOTE
Patient noted to have bradycardia overnight 2/5 with heart rate in the 40s transferred to telemetry  Afib with slow ventricular response heart rate 50s on monitor  Appreciate input of cardiology  Ongoing goals of care discussion-see plan for same  Telemetry discontinued 2/5  Transitioning to full comfort measures today

## 2025-02-06 NOTE — PLAN OF CARE
Problem: Prexisting or High Potential for Compromised Skin Integrity  Goal: Skin integrity is maintained or improved  Description: INTERVENTIONS:  - Identify patients at risk for skin breakdown  - Assess and monitor skin integrity  - Assess and monitor nutrition and hydration status  - Monitor labs   - Assess for incontinence   - Turn and reposition patient  - Assist with mobility/ambulation  - Relieve pressure over bony prominences  - Avoid friction and shearing  - Provide appropriate hygiene as needed including keeping skin clean and dry  - Evaluate need for skin moisturizer/barrier cream  - Collaborate with interdisciplinary team   - Patient/family teaching  - Consider wound care consult   Outcome: Progressing     Problem: SAFETY ADULT  Goal: Patient will remain free of falls  Description: INTERVENTIONS:  - Educate patient/family on patient safety including physical limitations  - Instruct patient to call for assistance with activity   - Consult OT/PT to assist with strengthening/mobility   - Keep Call bell within reach  - Keep bed low and locked with side rails adjusted as appropriate  - Keep care items and personal belongings within reach  - Initiate and maintain comfort rounds  - Make Fall Risk Sign visible to staff  - Offer Toileting every 2 Hours, in advance of need  - Initiate/Maintain bed alarm  - Obtain necessary fall risk management equipment: yellow socks  - Apply yellow socks and bracelet for high fall risk patients  - Consider moving patient to room near nurses station  Outcome: Progressing  Goal: Maintain or return to baseline ADL function  Description: INTERVENTIONS:  -  Assess patient's ability to carry out ADLs; assess patient's baseline for ADL function and identify physical deficits which impact ability to perform ADLs (bathing, care of mouth/teeth, toileting, grooming, dressing, etc.)  - Assess/evaluate cause of self-care deficits   - Assess range of motion  - Assess patient's mobility;  develop plan if impaired  - Assess patient's need for assistive devices and provide as appropriate  - Encourage maximum independence but intervene and supervise when necessary  - Involve family in performance of ADLs  - Assess for home care needs following discharge   - Consider OT consult to assist with ADL evaluation and planning for discharge  - Provide patient education as appropriate  Outcome: Progressing  Goal: Maintains/Returns to pre admission functional level  Description: INTERVENTIONS:  - Perform AM-PAC 6 Click Basic Mobility/ Daily Activity assessment daily.  - Set and communicate daily mobility goal to care team and patient/family/caregiver.   - Collaborate with rehabilitation services on mobility goals if consulted  - Perform Range of Motion 3 times a day.  - Reposition patient every 2 hours.  - Dangle patient 3 times a day  - Stand patient 3 times a day  - Ambulate patient 3 times a day  - Out of bed to chair 3 times a day   - Out of bed for meals 3 times a day  - Out of bed for toileting  - Record patient progress and toleration of activity level   Outcome: Progressing

## 2025-02-06 NOTE — ASSESSMENT & PLAN NOTE
See plan for goals of care  Had lengthy discussion with patient's niece Rachelle via the phone today.  Hospice liaison deemed patient appropriate for SNF level hospice 2/5.  States given patient's BMI it is going to be difficult to get her into a skilled facility for hospice.  Case management continues to work on this process.  In the interim at the request that we discontinue all oral medications, no further labs testing or treatments and provide as needed comfort medications for pain and anxiety which I have ordered.  Change status to DNR level 4 comfort measures

## 2025-02-06 NOTE — ASSESSMENT & PLAN NOTE
Currently A-fib heart rate 50s  Appreciate input of cardiology  Was receiving Coumadin for anticoagulation-discontinuing today after discussion with the family  Transitioning to comfort measures only  No further labs testing or treatments

## 2025-02-06 NOTE — TREATMENT PLAN
ID treatment plan:     Pt transitioned to hospice cares. Abx discontinued several days ago. ID consult service will sign off.

## 2025-02-06 NOTE — ASSESSMENT & PLAN NOTE
Wt Readings from Last 3 Encounters:   02/06/25 (!) 208 kg (458 lb 1.9 oz)   01/17/25 (!) 205 kg (451 lb)   01/01/25 (!) 205 kg (451 lb 14.4 oz)     11/7/2024 TTE: LVEF 60% with normal systolic function  Outpatient regimen includes furosemide 60 mg twice daily   Patient reported she was only taking Lasix in the a.m. at home due to low blood pressures despite midodrine 5 mg 3 times daily outpatient  Initially diuresing with IV Lasix, then transitioned to oral home regimen  Given [facility] nephrology was consulted, appreciate input  See goals of care discussion  Transitioning to full comfort measures today

## 2025-02-06 NOTE — ASSESSMENT & PLAN NOTE
Patient is on Lasix at home has been working peripherally with the team  Patient unfortunately requires midodrine 10 mg 3 times daily  Appreciate input of nephrology  Ongoing goals of care discussion-see plan to proceed  Transitioning to full comfort measures today

## 2025-02-06 NOTE — ASSESSMENT & PLAN NOTE
Lab Results   Component Value Date    EGFR 31 02/05/2025    EGFR 30 02/04/2025    EGFR 34 02/03/2025    CREATININE 1.62 (H) 02/05/2025    CREATININE 1.66 (H) 02/04/2025    CREATININE 1.49 (H) 02/03/2025     Appreciate input of nephrology who are following  Lasix has been on hold  See goals of care discussion  Transitioning to full comfort measures today  No further labs testing or treatments

## 2025-02-06 NOTE — ASSESSMENT & PLAN NOTE
Patient having acute mental status changes 1/31  UA positive-see plan for acute cystitis without hematuria  Transferred to [facility] 1/31 for stat head CT-negative for acute intracranial abnormality  Per discussion with nephew at patient had confusion with Oxy in the past-discontinued  Continues with occasional waxing and waning mentation  Reorient as needed  See goals of care discussion  Transitioning to full comfort measures today

## 2025-02-06 NOTE — ASSESSMENT & PLAN NOTE
Lab Results   Component Value Date    HGBA1C 7.2 (H) 11/06/2024       Recent Labs     02/04/25  2131   POCGLU 121         Blood Sugar Average: Last 72 hrs:  (P) 121  Patient denies any history of diabetes  Transitioning to full comfort measures today allow pleasure feed  No further labs testing or treatments

## 2025-02-06 NOTE — PROGRESS NOTES
Progress Note - Hospitalist   Name: Zee Kirk 74 y.o. female I MRN: [id number]  Unit/Bed#: [facility] 210[facility] I Date of Admission: 1/26/2025   Date of Service: 2/6/2025 I Hospital Day: 10    Assessment & Plan  Panniculitis  POA  Patient with redness that started just prior to admission, right side with drainage. Caregiver packing area w/ gauze  Images reviewed in the chart  CT imaging with no obvious abscess  Patient was not candidate for panniculectomy in the past  S/P cefepime and vancomycin  Appreciate input of ID, currently monitoring off of antibiotics  Continue supportive care  See goals of care discussion  Transition to full comfort measures today  History of Clostridioides difficile infection  Was receiving oral vancomycin, discontinuing today  Transitioning to full comfort measures  Hypothyroid  Discontinuing levothyroxine today  Comfort measures only  Class 3 severe obesity due to excess calories with serious comorbidity and body mass index (BMI) greater than or equal to 70 in adult (HCC)  BMI 81.15  Longstanding persistent atrial fibrillation (HCC)  Currently A-fib heart rate 50s  Appreciate input of cardiology  Was receiving Coumadin for anticoagulation-discontinuing today after discussion with the family  Transitioning to comfort measures only  No further labs testing or treatments  Chronic heart failure with preserved ejection fraction (HCC)  Wt Readings from Last 3 Encounters:   02/06/25 (!) 208 kg (458 lb 1.9 oz)   01/17/25 (!) 205 kg (451 lb)   01/01/25 (!) 205 kg (451 lb 14.4 oz)     11/7/2024 TTE: LVEF 60% with normal systolic function  Outpatient regimen includes furosemide 60 mg twice daily   Patient reported she was only taking Lasix in the a.m. at home due to low blood pressures despite midodrine 5 mg 3 times daily outpatient  Initially diuresing with IV Lasix, then transitioned to oral home regimen  Given LARRY nephrology was consulted, appreciate input  See goals of care  discussion  Transitioning to full comfort measures today  Type 2 diabetes mellitus without complication, without long-term current use of insulin ([facility])  Lab Results   Component Value Date    HGBA1C 7.2 (H) 11/06/2024       Recent Labs     02/04/25 2131   POCGLU 121         Blood Sugar Average: Last 72 hrs:  (P) 121  Patient denies any history of diabetes  Transitioning to full comfort measures today allow pleasure feed  No further labs testing or treatments  Goals of care, counseling/discussion  Goals of care discussion initiated with the patient 1/30 and have been continuing on a daily basis  2/1 had extensive discussion with patient in the presence of her nephew Robbie, niece Rachelle, patient's sister, and their children regarding patient's current medical comorbidities and goals of care.  We discussed the fact that patient has been having difficulty at home managing her lymphedema and becoming volume overloaded due to hypotension and not being able to take Lasix as described despite being on midodrine 3 times a day.  We also discussed her other comorbidities including CHF, and her atrial fibrillation, bedbound status, etc. we talked about patient having more frequent hospitalizations regarding being volume overloaded and hypotensive and having trouble managing her diuretics.  Yesterday I introduced the concepts of palliative care and hospice and patient expressed understanding but wanted to discuss with her family.  We again discussed these options today.  Patient expressed that she is not ready to die yet, although she does understand the current situation that she is in.  She was agreeable to a hospice consult which was placed  2/1 Hospice liaison called and spoke to family.  Patient's niece who works in healthcare completely understands patient's current health status and would be in agreement with hospice, however she is going to support what ever her aunt wants.  Patient is declining hospice at this  time  Continuing disease focused care, plan to stabilize medically and discharge back home with her caretakers  2/5 spoke with patient's niece regarding goals of care.  She is aware that patient is now confused and would not be able to make her own medical decisions.  Also aware of bradycardia with rates as low as 38 currently.  Family requested hospice to be reconsulted and patient was deemed acceptable for SNF level hospice.  Made patient DNR level 3  2/6 spoke with patient's niece Rachelle and made aware of patient's current blood pressure, bradycardia etc.  Rachelle requests we discontinue all prehospital medications and focus on comfort only with pain medication and anxiety medication.  I have discontinued all oral medications, will allow pleasure feed.  Added PRNs for morphine and Ativan.  Case management is continuing to work on hospice placement at [facility]  Change in mental status  Patient having acute mental status changes 1/31  UA positive-see plan for acute cystitis without hematuria  Transferred to [facility] 1/31 for stat head CT-negative for acute intracranial abnormality  Per discussion with nephew at patient had confusion with Oxy in the past-discontinued  Continues with occasional waxing and waning mentation  Reorient as needed  See goals of care discussion  Transitioning to full comfort measures today  Acute cystitis without hematuria  UA positive  Acute mental status changes started 1/31 see plan for same  Patient has numerous antibiotic allergies  S/P treatment with cefepime and vancomycin, monitored off of antibiotics since 2/4  Appreciate input of ID   Patient is being transitioned to comfort care as of today  Metabolic encephalopathy  Due to acute cystitis see plan for same  Evidenced by change in mental status  CT head negative  Continue neurochecks  IV antibiotics on hold per ID recommendations  Continues with waxing and waning mentation  See goals of care discussion  Transitioning to full  comfort measures today  LARRY (acute kidney injury) (HCC)  Patient is on Lasix at home has been working peripherally with the team  Patient unfortunately requires midodrine 10 mg 3 times daily  Appreciate input of nephrology  Ongoing goals of care discussion-see plan to proceed  Transitioning to full comfort measures today  Hypotension  Blood pressure results reviewed   Appreciate input of cardiology  Has been on midodrine, remains hypotensive  See goals of care discussion  Discontinuing all oral medications or discussion with family-comfort measures only    Stage 3a chronic kidney disease (HCC)  Lab Results   Component Value Date    EGFR 31 02/05/2025    EGFR 30 02/04/2025    EGFR 34 02/03/2025    CREATININE 1.62 (H) 02/05/2025    CREATININE 1.66 (H) 02/04/2025    CREATININE 1.49 (H) 02/03/2025     Appreciate input of nephrology who are following  Lasix has been on hold  See goals of care discussion  Transitioning to full comfort measures today  No further labs testing or treatments  Bradycardia  Patient noted to have bradycardia overnight 2/5 with heart rate in the 40s transferred to telemetry  Afib with slow ventricular response heart rate 50s on monitor  Appreciate input of cardiology  Ongoing goals of care discussion-see plan for same  Telemetry discontinued 2/5  Transitioning to full comfort measures today  Comfort measures only status  See plan for goals of care  Had lengthy discussion with patient's niece Rachelle via the phone today.  Hospice liaison deemed patient appropriate for SNF level hospice 2/5.  States given patient's BMI it is going to be difficult to get her into a skilled facility for hospice.  Case management continues to work on this process.  In the interim at the request that we discontinue all oral medications, no further labs testing or treatments and provide as needed comfort medications for pain and anxiety which I have ordered.  Change status to DNR level 4 comfort measures    VTE  Pharmacologic Prophylaxis: VTE Score: 8 High Risk (Score >/= 5) - Pharmacological DVT Prophylaxis Ordered: warfarin (Coumadin). Sequential Compression Devices Ordered.    Mobility:   Basic Mobility Inpatient Raw Score: 6  JH-HLM Goal: 2: Bed activities/Dependent transfer  JH-HLM Achieved: 2: Bed activities/Dependent transfer  JH-HLM Goal achieved. Continue to encourage appropriate mobility.    Patient Centered Rounds: I performed bedside rounds with nursing staff today.   Discussions with Specialists or Other Care Team Provider: Cardiology, nephrology, nursing, case management  Education and Discussions with Family / Patient: Updated  (niece) via phone.    Current Length of Stay: 10 day(s)  Current Patient Status: Inpatient   Certification Statement: The patient will continue to require additional inpatient hospital stay due to coordination of care-transitioning to full comfort measures      Discharge Plan: Per discussion with patient's niece transitioning to full comfort measures today.  Deseeding all oral medications, labs testing and treatments.  She was accepted by hospice appropriate for SNF, will likely be complicated due to her BMI.  Case management is following and making referrals.    Code Status: Level 4 - Comfort Care    Subjective   Oriented to self only.  Denies pain when asked.    Objective :  Temp:  [97.3 °F (36.3 °C)] 97.3 °F (36.3 °C)  HR:  [42-66] 51  BP: ()/(52-89) 89/58  Resp:  [18-20] 18  SpO2:  [95 %-99 %] 97 %  O2 Device: Nasal cannula  Nasal Cannula O2 Flow Rate (L/min):  [2 L/min] 2 L/min    Body mass index is 81.15 kg/m².     Input and Output Summary (last 24 hours):     Intake/Output Summary (Last 24 hours) at 2/6/2025 1036  Last data filed at 2/6/2025 0900  Gross per 24 hour   Intake 1080 ml   Output --   Net 1080 ml       Physical Exam  Vitals reviewed.   Constitutional:       General: She is not in acute distress.     Appearance: She is well-developed. She is obese.  She is ill-appearing.   HENT:      Head: Normocephalic and atraumatic.   Cardiovascular:      Rate and Rhythm: Bradycardia present. Rhythm irregular.      Pulses: Normal pulses.      Heart sounds: Normal heart sounds. No murmur heard.  Pulmonary:      Effort: Pulmonary effort is normal. No respiratory distress.      Breath sounds: No wheezing or rales.      Comments: Nonlabored respirations at rest on O2 4 L nasal cannula, decreased breath sounds bilateral bases, no cough or shortness of breath  Abdominal:      General: There is no distension.      Palpations: Abdomen is soft.      Tenderness: There is no abdominal tenderness. There is no guarding.   Musculoskeletal:         General: Swelling present.      Right lower leg: Edema present.      Left lower leg: Edema present.   Skin:     General: Skin is warm and dry.      Capillary Refill: Capillary refill takes less than 2 seconds.   Neurological:      Mental Status: She is alert. She is disoriented.           Lines/Drains:                Lab Results: I have reviewed the following results:   Results from last 7 days   Lab Units 02/05/25  0610   WBC Thousand/uL 10.93*   HEMOGLOBIN g/dL 9.2*   HEMATOCRIT % 33.2*   PLATELETS Thousands/uL 356   SEGS PCT % 78*   LYMPHO PCT % 10*   MONO PCT % 6   EOS PCT % 4     Results from last 7 days   Lab Units 02/05/25  0610   SODIUM mmol/L 141   POTASSIUM mmol/L 3.8   CHLORIDE mmol/L 104   CO2 mmol/L 31   BUN mg/dL 40*   CREATININE mg/dL 1.62*   ANION GAP mmol/L 6   CALCIUM mg/dL 7.5*   ALBUMIN g/dL 2.8*   TOTAL BILIRUBIN mg/dL 0.48   ALK PHOS U/L 59   ALT U/L 4*   AST U/L 8*   GLUCOSE RANDOM mg/dL 89     Results from last 7 days   Lab Units 02/06/25  0406   INR  2.80*     Results from last 7 days   Lab Units 02/04/25  2131   POC GLUCOSE mg/dl 121         Results from last 7 days   Lab Units 01/31/25  1812   LACTIC ACID mmol/L 1.0       Recent Cultures (last 7 days):   Results from last 7 days   Lab Units 01/31/25  1812   BLOOD  CULTURE  No Growth After 5 Days.  No Growth After 5 Days.       Imaging Results Review: I reviewed radiology reports from this admission including: CT abdomen pelvis, CT head.  Other Study Results Review: No additional pertinent studies reviewed.    Last 24 Hours Medication List:     Current Facility-Administered Medications:     LORazepam (ATIVAN) tablet 0.5 mg, Q3H PRN    morphine injection 2 mg, Q3H PRN    Administrative Statements   Today, Patient Was Seen By: ORACIO Last  I have spent a total time of 36 minutes in caring for this patient on the day of the visit/encounter including Patient and family education, Documenting in the medical record, Reviewing / ordering tests, medicine, procedures  , Communicating with other healthcare professionals , and ongoing goals of care discussion.    **Please Note: This note may have been constructed using a voice recognition system.**

## 2025-02-06 NOTE — CASE MANAGEMENT
Case Management Discharge Planning Note    Patient name Zee Kirk  Location /-01 MRN [id number]  : 1950 Date 2025       Current Admission Date: 2025  Current Admission Diagnosis:Panniculitis   Patient Active Problem List    Diagnosis Date Noted Date Diagnosed    Bradycardia 2025     Stage 3a chronic kidney disease (HCC) 2025     Metabolic encephalopathy 2025     Acute cystitis without hematuria 2025     Change in mental status 2025     Goals of care, counseling/discussion 2025     Bilateral lower extremity edema 2024     Hypotension 2024     Weakness 2024     Paroxysmal A-fib ([facility]) 2024     History of kidney stones 2024     Skin abnormalities 2024     Cataract, nuclear sclerotic senile, right 2024     Chronic heart failure with preserved ejection fraction ([facility]) 2024     Hypoxic episode 2024     Type 2 diabetes mellitus without complication, without long-term current use of insulin ([facility]) 2024     Long term (current) use of anticoagulants 2024     Subtherapeutic international normalized ratio (INR) 2024     Multiple open wounds 2024     Left leg pain 2023     Shingles 2023     Cellulitis- Ruled Out 2023     Longstanding persistent atrial fibrillation ([facility]) 2022     History of Clostridioides difficile infection 2022     Hypothyroid 10/03/2020     Mild episode of recurrent major depressive disorder ([facility]) 10/03/2020     Idiopathic chronic 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 10/03/2020     Class 3 severe obesity due to excess calories with serious comorbidity and body mass index (BMI) greater than or equal to 70 in adult ([facility]) 10/03/2020     Panniculitis 10/03/2020     Gastroesophageal reflux disease without esophagitis 10/03/2020     Hx MRSA infection 2020     Impaired mobility 2019     Osteoarthritis of glenohumeral joint  02/03/2019     Left ovarian cyst 09/20/2017     Depression with anxiety 07/15/2017     Anemia 12/28/2016     LARRY (acute kidney injury) (HCC) 12/27/2016     Adjustment disorder 09/05/2013     Irritable bowel syndrome 12/04/2012     Left atrial enlargement 12/04/2012     Left ventricular hypertrophy 12/04/2012     Carpal tunnel syndrome 05/30/2012     Gout 05/30/2012     Hyperlipidemia 05/30/2012     Symptomatic menopausal or female climacteric states 05/30/2012       LOS (days): 9  Geometric Mean LOS (GMLOS) (days): 4.4  Days to GMLOS:-4.7     OBJECTIVE:  Risk of Unplanned Readmission Score: 46.06         Current admission status: Inpatient   Preferred Pharmacy:   [facility] [place] - [address]  [place] [address]  Phone: [phone] Fax: [phone]    Primary Care Provider: Franklyn Caruso MD    Primary Insurance: MEDICARE  Secondary Insurance: AARP    DISCHARGE DETAILS:    Discharge planning discussed with:: patient & Rachelle at the bedside  Freedom of Choice: Yes  Comments - Freedom of Choice: pt and famiy would like hospice care at a snf- pt does not have 24 hr care-  cm sent a message [facility] liaison- they received a refrerral of the weekend  CM contacted family/caregiver?: Yes             Contacts  Patient Contacts: Heatehr  Relationship to Patient:: Family (shakeel)  Contact Method: In Person  Reason/Outcome: Discharge Planning    Requested Home Health Care         Is the patient interested in HHC at discharge?: No    DME Referral Provided  Referral made for DME?: No    Other Referral/Resources/Interventions Provided:  Interventions: SNF, Hospice  Referral Comments: cm was given permission to send snf referrals for snf with hospice care- pt was not stable to be d/c today as planned  & cm cx transport for today- cm called [facility] at 9:03 am & 9:26 am & 16:30pm and gave them the update on the pt's d/c plan, cm called Ruthann at [facility] at 9:31 am  to have him put a hold on the delivery of a new hospital bed and oxygen that was to be deliverd today- CM called [facility] at 16:32 pm and LM with the new d/c plan         Treatment Team Recommendation: SNF (snf with hospice care- referrals sent - BLS)

## 2025-02-06 NOTE — ASSESSMENT & PLAN NOTE
Blood pressure results reviewed   Appreciate input of cardiology  Has been on midodrine, remains hypotensive  See goals of care discussion  Discontinuing all oral medications or discussion with family-comfort measures only

## 2025-02-06 NOTE — ASSESSMENT & PLAN NOTE
Due to acute cystitis see plan for same  Evidenced by change in mental status  CT head negative  Continue neurochecks  IV antibiotics on hold per ID recommendations  Continues with waxing and waning mentation  See goals of care discussion  Transitioning to full comfort measures today

## 2025-02-06 NOTE — ASSESSMENT & PLAN NOTE
Goals of care discussion initiated with the patient 1/30 and have been continuing on a daily basis  2/1 had extensive discussion with patient in the presence of her nephew Robbie, niece Rachelle, patient's sister, and their children regarding patient's current medical comorbidities and goals of care.  We discussed the fact that patient has been having difficulty at home managing her lymphedema and becoming volume overloaded due to hypotension and not being able to take Lasix as described despite being on midodrine 3 times a day.  We also discussed her other comorbidities including CHF, and her atrial fibrillation, bedbound status, etc. we talked about patient having more frequent hospitalizations regarding being volume overloaded and hypotensive and having trouble managing her diuretics.  Yesterday I introduced the concepts of palliative care and hospice and patient expressed understanding but wanted to discuss with her family.  We again discussed these options today.  Patient expressed that she is not ready to die yet, although she does understand the current situation that she is in.  She was agreeable to a hospice consult which was placed  2/1 Hospice liaison called and spoke to family.  Patient's niece who works in healthcare completely understands patient's current health status and would be in agreement with hospice, however she is going to support what ever her aunt wants.  Patient is declining hospice at this time  Continuing disease focused care, plan to stabilize medically and discharge back home with her caretakers  2/5 spoke with patient's niece regarding goals of care.  She is aware that patient is now confused and would not be able to make her own medical decisions.  Also aware of bradycardia with rates as low as 38 currently.  Family requested hospice to be reconsulted and patient was deemed acceptable for SNF level hospice.  Made patient DNR level 3  2/6 spoke with patient's niece Rachelle and made  aware of patient's current blood pressure, bradycardia etc.  Rachelle requests we discontinue all prehospital medications and focus on comfort only with pain medication and anxiety medication.  I have discontinued all oral medications, will allow pleasure feed.  Added PRNs for morphine and Ativan.  Case management is continuing to work on hospice placement at [facility]

## 2025-02-07 PROCEDURE — 99233 SBSQ HOSP IP/OBS HIGH 50: CPT

## 2025-02-07 RX ADMIN — LORAZEPAM 0.5 MG: 0.5 TABLET ORAL at 14:54

## 2025-02-07 RX ADMIN — LORAZEPAM 0.5 MG: 0.5 TABLET ORAL at 00:38

## 2025-02-07 NOTE — ASSESSMENT & PLAN NOTE
Currently A-fib heart rate 50s  Appreciate input of cardiology  Was receiving Coumadin for anticoagulation-discontinuing today after discussion with the family  Transitioning to comfort measures only as of 2/6  No further labs testing or treatments

## 2025-02-07 NOTE — ASSESSMENT & PLAN NOTE
Goals of care discussion initiated with the patient 1/30 and have been continuing on a daily basis  2/1 had extensive discussion with patient in the presence of her nephew Robbie, niece Rachelle, patient's sister, and their children regarding patient's current medical comorbidities and goals of care.  We discussed the fact that patient has been having difficulty at home managing her lymphedema and becoming volume overloaded due to hypotension and not being able to take Lasix as described despite being on midodrine 3 times a day.  We also discussed her other comorbidities including CHF, and her atrial fibrillation, bedbound status, etc. we talked about patient having more frequent hospitalizations regarding being volume overloaded and hypotensive and having trouble managing her diuretics.  Yesterday I introduced the concepts of palliative care and hospice and patient expressed understanding but wanted to discuss with her family.  We again discussed these options today.  Patient expressed that she is not ready to die yet, although she does understand the current situation that she is in.  She was agreeable to a hospice consult which was placed  2/1 Hospice liaison called and spoke to family.  Patient's niece who works in healthcare completely understands patient's current health status and would be in agreement with hospice, however she is going to support what ever her aunt wants.  Patient is declining hospice at this time  Continuing disease focused care, plan to stabilize medically and discharge back home with her caretakers  2/5 spoke with patient's niece regarding goals of care.  She is aware that patient is now confused and would not be able to make her own medical decisions.  Also aware of bradycardia with rates as low as 38 currently.  Family requested hospice to be reconsulted and patient was deemed acceptable for SNF level hospice.  Made patient DNR level 3  2/6 spoke with patient's niece Rachelle and made  aware of patient's current blood pressure, bradycardia etc.  Rachelle requests we discontinue all prehospital medications and focus on comfort only with pain medication and anxiety medication.  I have discontinued all oral medications, will allow pleasure feed.  Added PRNs for morphine and Ativan.  Case management is continuing to work on hospice placement at [facility]

## 2025-02-07 NOTE — ASSESSMENT & PLAN NOTE
Blood pressure results reviewed   Appreciate input of cardiology  Has been on midodrine, remains hypotensive  See goals of care discussion  Discontinuing all oral medications or discussion with family-comfort measures only as of 2/6

## 2025-02-07 NOTE — ASSESSMENT & PLAN NOTE
Patient is on Lasix at home has been working peripherally with the team  Patient unfortunately requires midodrine 10 mg 3 times daily  Appreciate input of nephrology  Ongoing goals of care discussion-see plan to proceed  Transitioning to full comfort measures 2/6

## 2025-02-07 NOTE — ASSESSMENT & PLAN NOTE
Lab Results   Component Value Date    EGFR 31 02/05/2025    EGFR 30 02/04/2025    EGFR 34 02/03/2025    CREATININE 1.62 (H) 02/05/2025    CREATININE 1.66 (H) 02/04/2025    CREATININE 1.49 (H) 02/03/2025     Appreciate input of nephrology who are following  Lasix has been on hold  See goals of care discussion  Transitioning to full comfort measures 2/6  No further labs testing or treatments

## 2025-02-07 NOTE — ASSESSMENT & PLAN NOTE
See plan for goals of care  Had lengthy discussion with patient's niece Rachelle via the phone today.  Hospice liaison deemed patient appropriate for SNF level hospice 2/5.  States given patient's BMI it is going to be difficult to get her into a skilled facility for hospice.  Case management continues to work on this process.  In the interim at the request that we discontinue all oral medications, no further labs testing or treatments and provide as needed comfort medications for pain and anxiety which I have ordered.  Change status to DNR level 4 comfort measures as of 2/6

## 2025-02-07 NOTE — CASE MANAGEMENT
Case Management Discharge Planning Note    Patient name Zee Kirk  Location /-01 MRN [id number]  : 1950 Date 2025       Current Admission Date: 2025  Current Admission Diagnosis:Comfort measures only status   Patient Active Problem List    Diagnosis Date Noted Date Diagnosed    Comfort measures only status 2025     Bradycardia 2025     Stage 3a chronic kidney disease ([facility]) 2025     Metabolic encephalopathy 2025     Acute cystitis without hematuria 2025     Change in mental status 2025     Goals of care, counseling/discussion 2025     Bilateral lower extremity edema 2024     Hypotension 2024     Weakness 2024     Paroxysmal A-fib ([facility]) 2024     History of kidney stones 2024     Skin abnormalities 2024     Cataract, nuclear sclerotic senile, right 2024     Chronic heart failure with preserved ejection fraction ([facility]) 2024     Hypoxic episode 2024     Type 2 diabetes mellitus without complication, without long-term current use of insulin ([facility]) 2024     Long term (current) use of anticoagulants 2024     Subtherapeutic international normalized ratio (INR) 2024     Multiple open wounds 2024     Left leg pain 2023     Shingles 2023     Cellulitis- Ruled Out 2023     Longstanding persistent atrial fibrillation ([facility]) 2022     History of Clostridioides difficile infection 2022     Hypothyroid 10/03/2020     Mild episode of recurrent major depressive disorder ([facility]) 10/03/2020     Idiopathic chronic 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 10/03/2020     Class 3 severe obesity due to excess calories with serious comorbidity and body mass index (BMI) greater than or equal to 70 in adult ([facility]) 10/03/2020     Panniculitis 10/03/2020     Gastroesophageal reflux disease without esophagitis 10/03/2020     Hx MRSA infection 2020     Impaired  mobility 02/03/2019     Osteoarthritis of glenohumeral joint 02/03/2019     Left ovarian cyst 09/20/2017     Depression with anxiety 07/15/2017     Anemia 12/28/2016     LARRY (acute kidney injury) ([facility]) 12/27/2016     Adjustment disorder 09/05/2013     Irritable bowel syndrome 12/04/2012     Left atrial enlargement 12/04/2012     Left ventricular hypertrophy 12/04/2012     Carpal tunnel syndrome 05/30/2012     Gout 05/30/2012     Hyperlipidemia 05/30/2012     Symptomatic menopausal or female climacteric states 05/30/2012       LOS (days): 10  Geometric Mean LOS (GMLOS) (days): 4.4  Days to GMLOS:-5.7     OBJECTIVE:  Risk of Unplanned Readmission Score: 43.4         Current admission status: Inpatient   Preferred Pharmacy:   [facility] [place] - [facility]  [place] [address]  Phone: [phone] Fax: [phone]    Primary Care Provider: Franklyn Caruso MD    Primary Insurance: MEDICARE  Secondary Insurance: Health system    DISCHARGE DETAILS:    Discharge planning discussed with:: patient & Rachelle called cm today  Freedom of Choice: Yes  Comments - Freedom of Choice: family requested comfort care  CM contacted family/caregiver?: Yes             Contacts  Patient Contacts: Heatehr  Relationship to Patient:: Family  Contact Method: Phone  Phone Number: [phone]  Reason/Outcome: Discharge Planning    Requested Home Health Care         Is the patient interested in HHC at discharge?: No         Other Referral/Resources/Interventions Provided:  Interventions: Hospice, SNF  Referral Comments: pt was placed on Comfor care - referrals have been sent for  hospice care at a snf via [facility]in    Would you like to participate in our [facility] service program?  : No - Declined    Treatment Team Recommendation: SNF, Hospice (snf  on hospice care referrals sent - BLS)

## 2025-02-07 NOTE — ASSESSMENT & PLAN NOTE
Patient noted to have bradycardia overnight 2/5 with heart rate in the 40s transferred to telemetry  Afib with slow ventricular response heart rate 50s on monitor  Appreciate input of cardiology  Ongoing goals of care discussion-see plan for same  Telemetry discontinued 2/5  Transitioning to full comfort measures 2/6

## 2025-02-07 NOTE — ASSESSMENT & PLAN NOTE
Wt Readings from Last 3 Encounters:   02/06/25 (!) 208 kg (458 lb 1.9 oz)   01/17/25 (!) 205 kg (451 lb)   01/01/25 (!) 205 kg (451 lb 14.4 oz)     11/7/2024 TTE: LVEF 60% with normal systolic function  Outpatient regimen includes furosemide 60 mg twice daily   Patient reported she was only taking Lasix in the a.m. at home due to low blood pressures despite midodrine 5 mg 3 times daily outpatient  Initially diuresing with IV Lasix, then transitioned to oral home regimen  Given LARRY nephrology was consulted, appreciate input  See goals of care discussion  Transitioning to full comfort measures 2/6

## 2025-02-07 NOTE — PLAN OF CARE
Problem: Prexisting or High Potential for Compromised Skin Integrity  Goal: Skin integrity is maintained or improved  Description: INTERVENTIONS:  - Identify patients at risk for skin breakdown  - Assess and monitor skin integrity  - Assess and monitor nutrition and hydration status  - Monitor labs   - Assess for incontinence   - Turn and reposition patient  - Assist with mobility/ambulation  - Relieve pressure over bony prominences  - Avoid friction and shearing  - Provide appropriate hygiene as needed including keeping skin clean and dry  - Evaluate need for skin moisturizer/barrier cream  - Collaborate with interdisciplinary team   - Patient/family teaching  - Consider wound care consult   Outcome: Progressing     Problem: PAIN - ADULT  Goal: Verbalizes/displays adequate comfort level or baseline comfort level  Description: Interventions:  - Encourage patient to monitor pain and request assistance  - Assess pain using appropriate pain scale  - Administer analgesics based on type and severity of pain and evaluate response  - Implement non-pharmacological measures as appropriate and evaluate response  - Consider cultural and social influences on pain and pain management  - Notify physician/advanced practitioner if interventions unsuccessful or patient reports new pain  Outcome: Progressing     Problem: INFECTION - ADULT  Goal: Absence or prevention of progression during hospitalization  Description: INTERVENTIONS:  - Assess and monitor for signs and symptoms of infection  - Monitor lab/diagnostic results  - Monitor all insertion sites, i.e. indwelling lines, tubes, and drains  - Monitor endotracheal if appropriate and nasal secretions for changes in amount and color  - Arab appropriate cooling/warming therapies per order  - Administer medications as ordered  - Instruct and encourage patient and family to use good hand hygiene technique  - Identify and instruct in appropriate isolation precautions for  identified infection/condition  Outcome: Progressing  Goal: Absence of fever/infection during neutropenic period  Description: INTERVENTIONS:  - Monitor WBC    Outcome: Progressing     Problem: SAFETY ADULT  Goal: Patient will remain free of falls  Description: INTERVENTIONS:  - Educate patient/family on patient safety including physical limitations  - Instruct patient to call for assistance with activity   - Consult OT/PT to assist with strengthening/mobility   - Keep Call bell within reach  - Keep bed low and locked with side rails adjusted as appropriate  - Keep care items and personal belongings within reach  - Initiate and maintain comfort rounds  - Make Fall Risk Sign visible to staff  - Offer Toileting every 2 Hours, in advance of need  - Initiate/Maintain bed alarm  - Obtain necessary fall risk management equipment: yellow socks  - Apply yellow socks and bracelet for high fall risk patients  - Consider moving patient to room near nurses station  Outcome: Progressing  Goal: Maintain or return to baseline ADL function  Description: INTERVENTIONS:  -  Assess patient's ability to carry out ADLs; assess patient's baseline for ADL function and identify physical deficits which impact ability to perform ADLs (bathing, care of mouth/teeth, toileting, grooming, dressing, etc.)  - Assess/evaluate cause of self-care deficits   - Assess range of motion  - Assess patient's mobility; develop plan if impaired  - Assess patient's need for assistive devices and provide as appropriate  - Encourage maximum independence but intervene and supervise when necessary  - Involve family in performance of ADLs  - Assess for home care needs following discharge   - Consider OT consult to assist with ADL evaluation and planning for discharge  - Provide patient education as appropriate  Outcome: Progressing  Goal: Maintains/Returns to pre admission functional level  Description: INTERVENTIONS:  - Perform AM-PAC 6 Click Basic Mobility/ Daily  Activity assessment daily.  - Set and communicate daily mobility goal to care team and patient/family/caregiver.   - Collaborate with rehabilitation services on mobility goals if consulted  - Perform Range of Motion 3 times a day.  - Reposition patient every 2 hours.  - Dangle patient 3 times a day  - Stand patient 3 times a day  - Ambulate patient 3 times a day  - Out of bed to chair 3 times a day   - Out of bed for meals 3 times a day  - Out of bed for toileting  - Record patient progress and toleration of activity level   Outcome: Progressing     Problem: DISCHARGE PLANNING  Goal: Discharge to home or other facility with appropriate resources  Description: INTERVENTIONS:  - Identify barriers to discharge w/patient and caregiver  - Arrange for needed discharge resources and transportation as appropriate  - Identify discharge learning needs (meds, wound care, etc.)  - Arrange for interpretive services to assist at discharge as needed  - Refer to Case Management Department for coordinating discharge planning if the patient needs post-hospital services based on physician/advanced practitioner order or complex needs related to functional status, cognitive ability, or social support system  Outcome: Progressing     Problem: Knowledge Deficit  Goal: Patient/family/caregiver demonstrates understanding of disease process, treatment plan, medications, and discharge instructions  Description: Complete learning assessment and assess knowledge base.  Interventions:  - Provide teaching at level of understanding  - Provide teaching via preferred learning methods  Outcome: Progressing     Problem: GASTROINTESTINAL - ADULT  Goal: Minimal or absence of nausea and/or vomiting  Description: INTERVENTIONS:  - Administer IV fluids if ordered to ensure adequate hydration  - Maintain NPO status until nausea and vomiting are resolved  - Nasogastric tube if ordered  - Administer ordered antiemetic medications as needed  - Provide  nonpharmacologic comfort measures as appropriate  - Advance diet as tolerated, if ordered  - Consider nutrition services referral to assist patient with adequate nutrition and appropriate food choices  Outcome: Progressing  Goal: Maintains or returns to baseline bowel function  Description: INTERVENTIONS:  - Assess bowel function  - Encourage oral fluids to ensure adequate hydration  - Administer IV fluids if ordered to ensure adequate hydration  - Administer ordered medications as needed  - Encourage mobilization and activity  - Consider nutritional services referral to assist patient with adequate nutrition and appropriate food choices  Outcome: Progressing  Goal: Maintains adequate nutritional intake  Description: INTERVENTIONS:  - Monitor percentage of each meal consumed  - Identify factors contributing to decreased intake, treat as appropriate  - Assist with meals as needed  - Monitor I&O, weight, and lab values if indicated  - Obtain nutrition services referral as needed  Outcome: Progressing  Goal: Establish and maintain optimal ostomy function  Description: INTERVENTIONS:  - Assess bowel function  - Encourage oral fluids to ensure adequate hydration  - Administer IV fluids if ordered to ensure adequate hydration   - Administer ordered medications as needed  - Encourage mobilization and activity  - Nutrition services referral to assist patient with appropriate food choices  - Assess stoma site  - Consider wound care consult   Outcome: Progressing  Goal: Oral mucous membranes remain intact  Description: INTERVENTIONS  - Assess oral mucosa and hygiene practices  - Implement preventative oral hygiene regimen  - Implement oral medicated treatments as ordered  - Initiate Nutrition services referral as needed  Outcome: Progressing     Problem: Nutrition/Hydration-ADULT  Goal: Nutrient/Hydration intake appropriate for improving, restoring or maintaining nutritional needs  Description: Monitor and assess patient's  nutrition/hydration status for malnutrition. Collaborate with interdisciplinary team and initiate plan and interventions as ordered.  Monitor patient's weight and dietary intake as ordered or per policy. Utilize nutrition screening tool and intervene as necessary. Determine patient's food preferences and provide high-protein, high-caloric foods as appropriate.     INTERVENTIONS:  - Monitor oral intake, urinary output, labs, and treatment plans  - Assess nutrition and hydration status and recommend course of action  - Evaluate amount of meals eaten  - Assist patient with eating if necessary   - Allow adequate time for meals  - Recommend/ encourage appropriate diets, oral nutritional supplements, and vitamin/mineral supplements  - Order, calculate, and assess calorie counts as needed  - Recommend, monitor, and adjust tube feedings and TPN/PPN based on assessed needs  - Assess need for intravenous fluids  - Provide specific nutrition/hydration education as appropriate  - Include patient/family/caregiver in decisions related to nutrition  Outcome: Progressing     Problem: Decreased Cardiac Output  Goal: Cardiac output adequate for individual needs  Description: INTERVENTIONS: Monitor for signs and symptoms of decreased cardiac output   - Monitor for dyspnea with exertion and at rest  - Monitor for orthopnea  - Monitor for signs of tachycardia. Place patient on telemetry monitoring.  - Assess patient for jugular vein distention  - Assess patient for lower extremity edema and poor peripheral perfusion   - Auscultate lung sound for Fine bibasilar crackles   - Monitor for cardiac arrythmias   - Administer beta blockers, antiarrhythmic, and blood pressure medications as ordered    Outcome: Progressing     Problem: Impaired Gas Exchange  Goal: Optimize oxygenation and ensure adequate ventilation  Description: INTERVENTIONS: Monitor for signs and symptoms of respiratory distress                - Elevate HOB or use high fowlers  to promote lung expansion                - Administer oxygen as ordered to maintain adequate oxygenation                - Encourage use of IS to promote lung expansion and prevent PN                - Monitor ABGs to assess oxygenation status                - Monitor blood oxygen level to maintain adequate oxygenation                - Encourage cough and deep breathing exercises to promote lung expansion                - Monitor patient's mental status for increased confusion    Outcome: Progressing     Problem: Excess Fluid Volume  Goal: Patient is able to achieve and maintain homeostasis  Description: INTERVENTIONS: Monitor for sign and symptoms of fluid overload  - Evaluate LE edema every shift  - Elevate LE to prevent dependent edema  - Apply ALONDRA stockings as ordered   - Monitor ankle circumference daily  - Assess for jugular vein distention  - Evaluate provider orders for the CHF diuretic algorithm. Administer diuretics as ordered  - Weigh the patient daily at 0600 and report a weight gain of five pounds or more   - Strict intake and output  - Monitor fluid intake and adhere to fluid restrictions  - Assess lung sounds every shift and as needed  - Monitor vital signs and lab values (CBC, chem, BUN, BNP)  - Measure and document urine output    Outcome: Progressing     Problem: Activity Intolerance  Goal: Patient is able to perform activities within their limitations  Description: INTERVENTIONS:                       -   Alternate periods of activity with periods of rest                 -   Patients is able to maintain normal vitals heart rhythm during activity                 -   Gradually increase activity and exercise as patient can tolerate                 -   Monitor blood pressure and heart before and after exercise                  -   Monitor blood oxygen saturation during activity and apply oxygen as needed    Outcome: Progressing     Problem: Knowledge Deficit  Goal: Patient is able to verbalize  understanding of Heart Failure after education  Description: INTERVENTIONS:  - Educate the patient and family on signs and symptoms of HF  - Provide the patient with HF education and HF zone tool  - Educate on the importance of daily weight in the AM and reporting a weight gain               of 3 or more pounds to their primary care physician  - Monitor for SOB  - Maintain and sodium and fluid restriction  - Educate the patient on the importance of medications such as: diuretics, betablockers,               antiarrhythmics and their purpose, dose, route, side effects and labs               if they are needed    Outcome: Progressing     Problem: Potential for Falls  Goal: Patient will remain free of falls  Description: INTERVENTIONS:  - Educate patient/family on patient safety including physical limitations  - Instruct patient to call for assistance with activity   - Consult OT/PT to assist with strengthening/mobility   - Keep Call bell within reach  - Keep bed low and locked with side rails adjusted as appropriate  - Keep care items and personal belongings within reach  - Initiate and maintain comfort rounds  - Make Fall Risk Sign visible to staff  - Offer Toileting every 2 Hours, in advance of need  - Initiate/Maintain bed alarm  - Obtain necessary fall risk management equipment: yellow socks  - Apply yellow socks and bracelet for high fall risk patients  - Consider moving patient to room near nurses station  Outcome: Progressing

## 2025-02-07 NOTE — ASSESSMENT & PLAN NOTE
Patient having acute mental status changes 1/31  UA positive-see plan for acute cystitis without hematuria  Transferred to [facility] 1/31 for stat head CT-negative for acute intracranial abnormality  Per discussion with nephew at patient had confusion with Oxy in the past-discontinued  Continues with occasional waxing and waning mentation  Reorient as needed  See goals of care discussion  Transitioning to full comfort measures 2/6

## 2025-02-07 NOTE — ASSESSMENT & PLAN NOTE
UA positive  Acute mental status changes started 1/31 see plan for same  Patient has numerous antibiotic allergies  S/P treatment with cefepime and vancomycin, monitored off of antibiotics since 2/4  Appreciate input of ID   Patient is being transitioned to comfort care as of 2/6

## 2025-02-07 NOTE — ASSESSMENT & PLAN NOTE
POA  Patient with redness that started just prior to admission, right side with drainage. Caregiver packing area w/ gauze  Images reviewed in the chart  CT imaging with no obvious abscess  Patient was not candidate for panniculectomy in the past  S/P cefepime and vancomycin  Appreciate input of ID, currently monitoring off of antibiotics  Continue supportive care  See goals of care discussion  Transition to full comfort measures 2/6

## 2025-02-07 NOTE — ASSESSMENT & PLAN NOTE
Due to acute cystitis see plan for same  Evidenced by change in mental status  CT head negative  Continue neurochecks  IV antibiotics on hold per ID recommendations  Continues with waxing and waning mentation  See goals of care discussion  Transitioning to full comfort measures 2/6

## 2025-02-07 NOTE — CASE MANAGEMENT
Case Management Discharge Planning Note    Patient name Zee Kirk  Location /-01 MRN [id number]  : 1950 Date 2025       Current Admission Date: 2025  Current Admission Diagnosis:Comfort measures only status   Patient Active Problem List    Diagnosis Date Noted Date Diagnosed    Comfort measures only status 2025     Bradycardia 2025     Stage 3a chronic kidney disease ([facility]) 2025     Metabolic encephalopathy 2025     Acute cystitis without hematuria 2025     Change in mental status 2025     Goals of care, counseling/discussion 2025     Bilateral lower extremity edema 2024     Hypotension 2024     Weakness 2024     Paroxysmal A-fib ([facility]) 2024     History of kidney stones 2024     Skin abnormalities 2024     Cataract, nuclear sclerotic senile, right 2024     Chronic heart failure with preserved ejection fraction ([facility]) 2024     Hypoxic episode 2024     Type 2 diabetes mellitus without complication, without long-term current use of insulin ([facility]) 2024     Long term (current) use of anticoagulants 2024     Subtherapeutic international normalized ratio (INR) 2024     Multiple open wounds 2024     Left leg pain 2023     Shingles 2023     Cellulitis- Ruled Out 2023     Longstanding persistent atrial fibrillation ([facility]) 2022     History of Clostridioides difficile infection 2022     Hypothyroid 10/03/2020     Mild episode of recurrent major depressive disorder ([facility]) 10/03/2020     Idiopathic chronic 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 10/03/2020     Class 3 severe obesity due to excess calories with serious comorbidity and body mass index (BMI) greater than or equal to 70 in adult ([facility]) 10/03/2020     Panniculitis 10/03/2020     Gastroesophageal reflux disease without esophagitis 10/03/2020     Hx MRSA infection 2020     Impaired  mobility 02/03/2019     Osteoarthritis of glenohumeral joint 02/03/2019     Left ovarian cyst 09/20/2017     Depression with anxiety 07/15/2017     Anemia 12/28/2016     LARRY (acute kidney injury) (HCC) 12/27/2016     Adjustment disorder 09/05/2013     Irritable bowel syndrome 12/04/2012     Left atrial enlargement 12/04/2012     Left ventricular hypertrophy 12/04/2012     Carpal tunnel syndrome 05/30/2012     Gout 05/30/2012     Hyperlipidemia 05/30/2012     Symptomatic menopausal or female climacteric states 05/30/2012       LOS (days): 11  Geometric Mean LOS (GMLOS) (days): 4.4  Days to GMLOS:-6.6     OBJECTIVE:  Risk of Unplanned Readmission Score: 43.83         Current admission status: Inpatient   Preferred Pharmacy:   [facility] [place] - [facility]  [place] [address]  Phone: [phone] Fax: [phone]    Primary Care Provider: Franklyn Caruso MD    Primary Insurance: MEDICARE  Secondary Insurance: [facility]    DISCHARGE DETAILS:    Discharge planning discussed with:: Rachelle  Freedom of Choice: Yes  Comments - Freedom of Choice: pt is on comfort care- cm did send snf referrals for a hospice bed via aidin- cm expaned the search - pt needs a bariatric and ther are many snf 's that are not able to accept her due to her weight- cm will continue to follow and work on a safe d/c plan  CM contacted family/caregiver?: Yes             Contacts  Patient Contacts: Heatehr  Relationship to Patient:: Family  Contact Method: Phone  Phone Number: [phone]  Reason/Outcome: Discharge Planning    Requested Home Health Care         Is the patient interested in HHC at discharge?: No    DME Referral Provided  Referral made for DME?: No    Other Referral/Resources/Interventions Provided:  Interventions: Hospice, SNF  Referral Comments: pt is on comfort care - hospice referral was made and pt was accepted by [facility]  & she mets for home or snf hospice care -  pt  does not ahve 24 hrs care givers- pt needs to go to a snf on hospice care    Would you like to participate in our [facility] service program?  : No - Declined    Treatment Team Recommendation: SNF (snf with [facility] referrals sent - BLS)

## 2025-02-08 PROCEDURE — 99233 SBSQ HOSP IP/OBS HIGH 50: CPT

## 2025-02-08 NOTE — ASSESSMENT & PLAN NOTE
Patient having acute mental status changes 1/31  UA positive-see plan for acute cystitis without hematuria  Transferred to [facility] 1/31 for stat head CT-negative for acute intracranial abnormality  Per discussion with nephew at patient had confusion with Oxy in the past-discontinued  Continues with occasional waxing and waning mentation  Reorient as needed  See goals of care discussion  Transitioning to full comfort measures 2/6

## 2025-02-08 NOTE — ASSESSMENT & PLAN NOTE
Wt Readings from Last 3 Encounters:   02/06/25 (!) 208 kg (458 lb 1.9 oz)   01/17/25 (!) 205 kg (451 lb)   01/01/25 (!) 205 kg (451 lb 14.4 oz)     11/7/2024 TTE: LVEF 60% with normal systolic function  Outpatient regimen includes furosemide 60 mg twice daily   Patient reported she was only taking Lasix in the a.m. at home due to low blood pressures despite midodrine 5 mg 3 times daily outpatient  Initially diuresing with IV Lasix, then transitioned to oral home regimen-discontinued as of 2/6  Given LARRY nephrology was consulted, appreciate input  See goals of care discussion  Transitioning to full comfort measures 2/6

## 2025-02-08 NOTE — PLAN OF CARE
Problem: Prexisting or High Potential for Compromised Skin Integrity  Goal: Skin integrity is maintained or improved  Description: INTERVENTIONS:  - Identify patients at risk for skin breakdown  - Assess and monitor skin integrity  - Assess and monitor nutrition and hydration status  - Monitor labs   - Assess for incontinence   - Turn and reposition patient  - Assist with mobility/ambulation  - Relieve pressure over bony prominences  - Avoid friction and shearing  - Provide appropriate hygiene as needed including keeping skin clean and dry  - Evaluate need for skin moisturizer/barrier cream  - Collaborate with interdisciplinary team   - Patient/family teaching  - Consider wound care consult   Outcome: Progressing     Problem: PAIN - ADULT  Goal: Verbalizes/displays adequate comfort level or baseline comfort level  Description: Interventions:  - Encourage patient to monitor pain and request assistance  - Assess pain using appropriate pain scale  - Administer analgesics based on type and severity of pain and evaluate response  - Implement non-pharmacological measures as appropriate and evaluate response  - Consider cultural and social influences on pain and pain management  - Notify physician/advanced practitioner if interventions unsuccessful or patient reports new pain  Outcome: Progressing     Problem: INFECTION - ADULT  Goal: Absence or prevention of progression during hospitalization  Description: INTERVENTIONS:  - Assess and monitor for signs and symptoms of infection  - Monitor lab/diagnostic results  - Monitor all insertion sites, i.e. indwelling lines, tubes, and drains  - Monitor endotracheal if appropriate and nasal secretions for changes in amount and color  - [place] appropriate cooling/warming therapies per order  - Administer medications as ordered  - Instruct and encourage patient and family to use good hand hygiene technique  - Identify and instruct in appropriate isolation precautions for  identified infection/condition  Outcome: Progressing  Goal: Absence of fever/infection during neutropenic period  Description: INTERVENTIONS:  - Monitor WBC    Outcome: Progressing     Problem: SAFETY ADULT  Goal: Patient will remain free of falls  Description: INTERVENTIONS:  - Educate patient/family on patient safety including physical limitations  - Instruct patient to call for assistance with activity   - Consult OT/PT to assist with strengthening/mobility   - Keep Call bell within reach  - Keep bed low and locked with side rails adjusted as appropriate  - Keep care items and personal belongings within reach  - Initiate and maintain comfort rounds  - Make Fall Risk Sign visible to staff  - Offer Toileting every 2 Hours, in advance of need  - Initiate/Maintain bed alarm  - Obtain necessary fall risk management equipment: yellow socks  - Apply yellow socks and bracelet for high fall risk patients  - Consider moving patient to room near nurses station  Outcome: Progressing  Goal: Maintain or return to baseline ADL function  Description: INTERVENTIONS:  -  Assess patient's ability to carry out ADLs; assess patient's baseline for ADL function and identify physical deficits which impact ability to perform ADLs (bathing, care of mouth/teeth, toileting, grooming, dressing, etc.)  - Assess/evaluate cause of self-care deficits   - Assess range of motion  - Assess patient's mobility; develop plan if impaired  - Assess patient's need for assistive devices and provide as appropriate  - Encourage maximum independence but intervene and supervise when necessary  - Involve family in performance of ADLs  - Assess for home care needs following discharge   - Consider OT consult to assist with ADL evaluation and planning for discharge  - Provide patient education as appropriate  Outcome: Progressing  Goal: Maintains/Returns to pre admission functional level  Description: INTERVENTIONS:  - Perform AM-PAC 6 Click Basic Mobility/ Daily  Activity assessment daily.  - Set and communicate daily mobility goal to care team and patient/family/caregiver.   - Collaborate with rehabilitation services on mobility goals if consulted  - Perform Range of Motion 3 times a day.  - Reposition patient every 2 hours.  - Dangle patient 3 times a day  - Stand patient 3 times a day  - Ambulate patient 3 times a day  - Out of bed to chair 3 times a day   - Out of bed for meals 3 times a day  - Out of bed for toileting  - Record patient progress and toleration of activity level   Outcome: Progressing     Problem: DISCHARGE PLANNING  Goal: Discharge to home or other facility with appropriate resources  Description: INTERVENTIONS:  - Identify barriers to discharge w/patient and caregiver  - Arrange for needed discharge resources and transportation as appropriate  - Identify discharge learning needs (meds, wound care, etc.)  - Arrange for interpretive services to assist at discharge as needed  - Refer to Case Management Department for coordinating discharge planning if the patient needs post-hospital services based on physician/advanced practitioner order or complex needs related to functional status, cognitive ability, or social support system  Outcome: Progressing     Problem: Knowledge Deficit  Goal: Patient/family/caregiver demonstrates understanding of disease process, treatment plan, medications, and discharge instructions  Description: Complete learning assessment and assess knowledge base.  Interventions:  - Provide teaching at level of understanding  - Provide teaching via preferred learning methods  Outcome: Progressing     Problem: GASTROINTESTINAL - ADULT  Goal: Minimal or absence of nausea and/or vomiting  Description: INTERVENTIONS:  - Administer IV fluids if ordered to ensure adequate hydration  - Maintain NPO status until nausea and vomiting are resolved  - Nasogastric tube if ordered  - Administer ordered antiemetic medications as needed  - Provide  nonpharmacologic comfort measures as appropriate  - Advance diet as tolerated, if ordered  - Consider nutrition services referral to assist patient with adequate nutrition and appropriate food choices  Outcome: Progressing  Goal: Maintains or returns to baseline bowel function  Description: INTERVENTIONS:  - Assess bowel function  - Encourage oral fluids to ensure adequate hydration  - Administer IV fluids if ordered to ensure adequate hydration  - Administer ordered medications as needed  - Encourage mobilization and activity  - Consider nutritional services referral to assist patient with adequate nutrition and appropriate food choices  Outcome: Progressing  Goal: Maintains adequate nutritional intake  Description: INTERVENTIONS:  - Monitor percentage of each meal consumed  - Identify factors contributing to decreased intake, treat as appropriate  - Assist with meals as needed  - Monitor I&O, weight, and lab values if indicated  - Obtain nutrition services referral as needed  Outcome: Progressing  Goal: Establish and maintain optimal ostomy function  Description: INTERVENTIONS:  - Assess bowel function  - Encourage oral fluids to ensure adequate hydration  - Administer IV fluids if ordered to ensure adequate hydration   - Administer ordered medications as needed  - Encourage mobilization and activity  - Nutrition services referral to assist patient with appropriate food choices  - Assess stoma site  - Consider wound care consult   Outcome: Progressing  Goal: Oral mucous membranes remain intact  Description: INTERVENTIONS  - Assess oral mucosa and hygiene practices  - Implement preventative oral hygiene regimen  - Implement oral medicated treatments as ordered  - Initiate Nutrition services referral as needed  Outcome: Progressing     Problem: Nutrition/Hydration-ADULT  Goal: Nutrient/Hydration intake appropriate for improving, restoring or maintaining nutritional needs  Description: Monitor and assess patient's  nutrition/hydration status for malnutrition. Collaborate with interdisciplinary team and initiate plan and interventions as ordered.  Monitor patient's weight and dietary intake as ordered or per policy. Utilize nutrition screening tool and intervene as necessary. Determine patient's food preferences and provide high-protein, high-caloric foods as appropriate.     INTERVENTIONS:  - Monitor oral intake, urinary output, labs, and treatment plans  - Assess nutrition and hydration status and recommend course of action  - Evaluate amount of meals eaten  - Assist patient with eating if necessary   - Allow adequate time for meals  - Recommend/ encourage appropriate diets, oral nutritional supplements, and vitamin/mineral supplements  - Order, calculate, and assess calorie counts as needed  - Recommend, monitor, and adjust tube feedings and TPN/PPN based on assessed needs  - Assess need for intravenous fluids  - Provide specific nutrition/hydration education as appropriate  - Include patient/family/caregiver in decisions related to nutrition  Outcome: Progressing     Problem: Decreased Cardiac Output  Goal: Cardiac output adequate for individual needs  Description: INTERVENTIONS: Monitor for signs and symptoms of decreased cardiac output   - Monitor for dyspnea with exertion and at rest  - Monitor for orthopnea  - Monitor for signs of tachycardia. Place patient on telemetry monitoring.  - Assess patient for jugular vein distention  - Assess patient for lower extremity edema and poor peripheral perfusion   - Auscultate lung sound for Fine bibasilar crackles   - Monitor for cardiac arrythmias   - Administer beta blockers, antiarrhythmic, and blood pressure medications as ordered    Outcome: Progressing     Problem: Impaired Gas Exchange  Goal: Optimize oxygenation and ensure adequate ventilation  Description: INTERVENTIONS: Monitor for signs and symptoms of respiratory distress                - Elevate HOB or use high fowlers  to promote lung expansion                - Administer oxygen as ordered to maintain adequate oxygenation                - Encourage use of IS to promote lung expansion and prevent PN                - Monitor ABGs to assess oxygenation status                - Monitor blood oxygen level to maintain adequate oxygenation                - Encourage cough and deep breathing exercises to promote lung expansion                - Monitor patient's mental status for increased confusion    Outcome: Progressing     Problem: Excess Fluid Volume  Goal: Patient is able to achieve and maintain homeostasis  Description: INTERVENTIONS: Monitor for sign and symptoms of fluid overload  - Evaluate LE edema every shift  - Elevate LE to prevent dependent edema  - Apply ALONDRA stockings as ordered   - Monitor ankle circumference daily  - Assess for jugular vein distention  - Evaluate provider orders for the CHF diuretic algorithm. Administer diuretics as ordered  - Weigh the patient daily at 0600 and report a weight gain of five pounds or more   - Strict intake and output  - Monitor fluid intake and adhere to fluid restrictions  - Assess lung sounds every shift and as needed  - Monitor vital signs and lab values (CBC, chem, BUN, BNP)  - Measure and document urine output    Outcome: Progressing     Problem: Activity Intolerance  Goal: Patient is able to perform activities within their limitations  Description: INTERVENTIONS:                       -   Alternate periods of activity with periods of rest                 -   Patients is able to maintain normal vitals heart rhythm during activity                 -   Gradually increase activity and exercise as patient can tolerate                 -   Monitor blood pressure and heart before and after exercise                  -   Monitor blood oxygen saturation during activity and apply oxygen as needed    Outcome: Progressing     Problem: Knowledge Deficit  Goal: Patient is able to verbalize  understanding of Heart Failure after education  Description: INTERVENTIONS:  - Educate the patient and family on signs and symptoms of HF  - Provide the patient with HF education and HF zone tool  - Educate on the importance of daily weight in the AM and reporting a weight gain               of 3 or more pounds to their primary care physician  - Monitor for SOB  - Maintain and sodium and fluid restriction  - Educate the patient on the importance of medications such as: diuretics, betablockers,               antiarrhythmics and their purpose, dose, route, side effects and labs               if they are needed    Outcome: Progressing     Problem: Potential for Falls  Goal: Patient will remain free of falls  Description: INTERVENTIONS:  - Educate patient/family on patient safety including physical limitations  - Instruct patient to call for assistance with activity   - Consult OT/PT to assist with strengthening/mobility   - Keep Call bell within reach  - Keep bed low and locked with side rails adjusted as appropriate  - Keep care items and personal belongings within reach  - Initiate and maintain comfort rounds  - Make Fall Risk Sign visible to staff  - Offer Toileting every 2 Hours, in advance of need  - Initiate/Maintain bed alarm  - Obtain necessary fall risk management equipment: yellow socks  - Apply yellow socks and bracelet for high fall risk patients  - Consider moving patient to room near nurses station  Outcome: Progressing

## 2025-02-08 NOTE — ASSESSMENT & PLAN NOTE
POA  Patient with redness that started just prior to admission, right side with drainage. Caregiver packing area w/ gauze  Images reviewed in the chart  CT imaging with no obvious abscess  Patient was not candidate for panniculectomy in the past  S/P cefepime and vancomycin  Appreciate input of ID, was monitoring off of antibiotics  Continue supportive care  See goals of care discussion  Transition to full comfort measures 2/6

## 2025-02-08 NOTE — ASSESSMENT & PLAN NOTE
Currently A-fib heart rate 50s  Appreciate input of cardiology  Was receiving Coumadin for anticoagulation-discontinued 2/6 after discussion with the family  Transitioning to comfort measures only as of 2/6  No further labs testing or treatments

## 2025-02-08 NOTE — ASSESSMENT & PLAN NOTE
"Lab Results   Component Value Date    HGBA1C 7.2 (H) 11/06/2024       No results for input(s): \"POCGLU\" in the last 72 hours.        Blood Sugar Average: Last 72 hrs:    Patient denies any history of diabetes  Transitioned to full comfort measures today allow pleasure feed  No further labs testing or treatments  "

## 2025-02-08 NOTE — ASSESSMENT & PLAN NOTE
Goals of care discussion initiated with the patient 1/30 and have been continuing on a daily basis  2/1 had extensive discussion with patient in the presence of her nephew Robbie, niece Rachelle, patient's sister, and their children regarding patient's current medical comorbidities and goals of care.  We discussed the fact that patient has been having difficulty at home managing her lymphedema and becoming volume overloaded due to hypotension and not being able to take Lasix as described despite being on midodrine 3 times a day.  We also discussed her other comorbidities including CHF, and her atrial fibrillation, bedbound status, etc. we talked about patient having more frequent hospitalizations regarding being volume overloaded and hypotensive and having trouble managing her diuretics.  Yesterday I introduced the concepts of palliative care and hospice and patient expressed understanding but wanted to discuss with her family.  We again discussed these options today.  Patient expressed that she is not ready to die yet, although she does understand the current situation that she is in.  She was agreeable to a hospice consult which was placed  2/1 Hospice liaison called and spoke to family.  Patient's niece who works in healthcare completely understands patient's current health status and would be in agreement with hospice, however she is going to support what ever her aunt wants.  Patient is declining hospice at this time  Continuing disease focused care, plan to stabilize medically and discharge back home with her caretakers  2/5 spoke with patient's niece regarding goals of care.  She is aware that patient is now confused and would not be able to make her own medical decisions.  Also aware of bradycardia with rates as low as 38 currently.  Family requested hospice to be reconsulted and patient was deemed acceptable for SNF level hospice.  Made patient DNR level 3  2/6 spoke with patient's niece Rachelle and made  aware of patient's current blood pressure, bradycardia etc.  Rachelle requests we discontinue all prehospital medications and focus on comfort only with pain medication and anxiety medication.  I have discontinued all oral medications, will allow pleasure feed.  Added PRNs for morphine and Ativan.  Case management is continuing to work on hospice placement at [facility]

## 2025-02-09 PROCEDURE — 99231 SBSQ HOSP IP/OBS SF/LOW 25: CPT

## 2025-02-09 RX ORDER — LORAZEPAM 2 MG/ML
INJECTION INTRAMUSCULAR
Status: DISPENSED
Start: 2025-02-09 | End: 2025-02-09

## 2025-02-09 RX ORDER — DIPHENHYDRAMINE HYDROCHLORIDE 50 MG/ML
50 INJECTION, SOLUTION INTRAMUSCULAR; INTRAVENOUS EVERY 6 HOURS PRN
Status: DISCONTINUED | OUTPATIENT
Start: 2025-02-09 | End: 2025-02-21 | Stop reason: HOSPADM

## 2025-02-09 RX ORDER — LORAZEPAM 1 MG/1
1 TABLET ORAL
Status: DISCONTINUED | OUTPATIENT
Start: 2025-02-09 | End: 2025-02-21 | Stop reason: HOSPADM

## 2025-02-09 RX ORDER — LORAZEPAM 2 MG/ML
1 INJECTION INTRAMUSCULAR ONCE
Status: COMPLETED | OUTPATIENT
Start: 2025-02-09 | End: 2025-02-09

## 2025-02-09 RX ADMIN — DIPHENHYDRAMINE HYDROCHLORIDE 50 MG: 50 INJECTION, SOLUTION INTRAMUSCULAR; INTRAVENOUS at 01:56

## 2025-02-09 RX ADMIN — LORAZEPAM 1 MG: 2 INJECTION INTRAMUSCULAR; INTRAVENOUS at 03:42

## 2025-02-09 NOTE — ASSESSMENT & PLAN NOTE
Lab Results   Component Value Date    EGFR 31 02/05/2025    EGFR 30 02/04/2025    EGFR 34 02/03/2025    CREATININE 1.62 (H) 02/05/2025    CREATININE 1.66 (H) 02/04/2025    CREATININE 1.49 (H) 02/03/2025     Appreciate input of nephrology who followed  Lasix has been on hold as of 2/6  See goals of care discussion  Transitioned to full comfort measures 2/6  No further labs testing or treatments

## 2025-02-09 NOTE — ASSESSMENT & PLAN NOTE
"Lab Results   Component Value Date    HGBA1C 7.2 (H) 11/06/2024       No results for input(s): \"POCGLU\" in the last 72 hours.        Blood Sugar Average: Last 72 hrs:    Patient denies any history of diabetes  Transitioned to full comfort measures 2/6 allow pleasure feed  No further labs testing or treatments  "

## 2025-02-09 NOTE — ASSESSMENT & PLAN NOTE
Heart rate remains 40s to 50s  Appreciate input of cardiology  Was receiving Coumadin for anticoagulation-discontinued 2/6 after discussion with the family  Transitioned to comfort measures only as of 2/6  No further labs testing or treatments

## 2025-02-09 NOTE — ASSESSMENT & PLAN NOTE
Goals of care discussion initiated with the patient 1/30 and have been continuing on a daily basis  2/1 had extensive discussion with patient in the presence of her nephew Robbie, niece Rachelle, patient's sister, and their children regarding patient's current medical comorbidities and goals of care.  We discussed the fact that patient has been having difficulty at home managing her lymphedema and becoming volume overloaded due to hypotension and not being able to take Lasix as described despite being on midodrine 3 times a day.  We also discussed her other comorbidities including CHF, and her atrial fibrillation, bedbound status, etc. we talked about patient having more frequent hospitalizations regarding being volume overloaded and hypotensive and having trouble managing her diuretics.  Yesterday I introduced the concepts of palliative care and hospice and patient expressed understanding but wanted to discuss with her family.  We again discussed these options today.  Patient expressed that she is not ready to die yet, although she does understand the current situation that she is in.  She was agreeable to a hospice consult which was placed  2/1 Hospice liaison called and spoke to family.  Patient's niece who works in healthcare completely understands patient's current health status and would be in agreement with hospice, however she is going to support what ever her aunt wants.  Patient is declining hospice at this time  Continued with daily goals of care discussions  2/5 spoke with patient's niece regarding goals of care.  She is aware that patient is now confused and would not be able to make her own medical decisions.  Also aware of bradycardia with rates as low as 38 currently.  Family requested hospice to be reconsulted and patient was deemed acceptable for SNF level hospice.  Made patient DNR level 3  2/6 spoke with patient's niece Rachelle and made aware of patient's current blood pressure, bradycardia etc.   Rachelle requests we discontinue all prehospital medications and focus on comfort only with pain medication and anxiety medication.  I have discontinued all oral medications, will allow pleasure feed.  Added PRNs for morphine and Ativan.  Case management is continuing to work on hospice placement at SNF  Patient is currently on comfort measures as of 2/6.  Accepted to hospice services at [facility].  Case management has made numerous referrals and are having some difficulty due to patient's BMI.  Will discharge when arrangements finalized

## 2025-02-09 NOTE — ASSESSMENT & PLAN NOTE
Blood pressure results   Was requiring midodrine for diuresis, all discontinued on 2/6 when transition to comfort measures  See goals of care discussion

## 2025-02-09 NOTE — PROGRESS NOTES
Progress Note - Hospitalist   Name: Zee Kirk 74 y.o. female I MRN: [id number]  Unit/Bed#: [facility] Date of Admission: 1/26/2025   Date of Service: 2/9/2025 I Hospital Day: 13    Assessment & Plan  Panniculitis  POA  Patient with redness that started just prior to admission, right side with drainage. Caregiver packing area w/ gauze  Images reviewed in the chart  CT imaging with no obvious abscess  Patient was not candidate for panniculectomy in the past  S/P cefepime and vancomycin  Appreciate input of ID, was monitoring off of antibiotics  Continue supportive care  See goals of care discussion  Transition to full comfort measures 2/6  History of Clostridioides difficile infection  Was receiving oral vancomycin, discontinued 2/6  Transitioned to full comfort measures 2/6  Hypothyroid  Discontinued  levothyroxine 2/6 2/6 transitioned to comfort measures only  Class 3 severe obesity due to excess calories with serious comorbidity and body mass index (BMI) greater than or equal to 70 in adult (HCC)  BMI 81.15  Longstanding persistent atrial fibrillation (HCC)  Heart rate remains 40s to 50s  Appreciate input of cardiology  Was receiving Coumadin for anticoagulation-discontinued 2/6 after discussion with the family  Transitioned to comfort measures only as of 2/6  No further labs testing or treatments  Chronic heart failure with preserved ejection fraction (HCC)  Wt Readings from Last 3 Encounters:   02/06/25 (!) 208 kg (458 lb 1.9 oz)   01/17/25 (!) 205 kg (451 lb)   01/01/25 (!) 205 kg (451 lb 14.4 oz)     11/7/2024 TTE: LVEF 60% with normal systolic function  Outpatient regimen includes furosemide 60 mg twice daily   Noncompliant with outpatient Lasix due to hypotension  Initially diuresing with IV Lasix, then transitioned to oral home regimen-discontinued as of 2/6  Given LARRY nephrology was consulted, appreciate input  See goals of care discussion  Transitioned to full comfort measures 2/6  Type 2  "diabetes mellitus without complication, without long-term current use of insulin ([facility])  Lab Results   Component Value Date    HGBA1C 7.2 (H) 11/06/2024       No results for input(s): \"POCGLU\" in the last 72 hours.        Blood Sugar Average: Last 72 hrs:    Patient denies any history of diabetes  Transitioned to full comfort measures 2/6 allow pleasure feed  No further labs testing or treatments  Goals of care, counseling/discussion  Goals of care discussion initiated with the patient 1/30 and have been continuing on a daily basis  2/1 had extensive discussion with patient in the presence of her nephew Robbie, niece Rachelle, patient's sister, and their children regarding patient's current medical comorbidities and goals of care.  We discussed the fact that patient has been having difficulty at home managing her lymphedema and becoming volume overloaded due to hypotension and not being able to take Lasix as described despite being on midodrine 3 times a day.  We also discussed her other comorbidities including CHF, and her atrial fibrillation, bedbound status, etc. we talked about patient having more frequent hospitalizations regarding being volume overloaded and hypotensive and having trouble managing her diuretics.  Yesterday I introduced the concepts of palliative care and hospice and patient expressed understanding but wanted to discuss with her family.  We again discussed these options today.  Patient expressed that she is not ready to die yet, although she does understand the current situation that she is in.  She was agreeable to a hospice consult which was placed  2/1 Hospice liaison called and spoke to family.  Patient's niece who works in healthcare completely understands patient's current health status and would be in agreement with hospice, however she is going to support what ever her aunt wants.  Patient is declining hospice at this time  Continued with daily goals of care discussions  2/5 spoke with " patient's niece regarding goals of care.  She is aware that patient is now confused and would not be able to make her own medical decisions.  Also aware of bradycardia with rates as low as 38 currently.  Family requested hospice to be reconsulted and patient was deemed acceptable for SNF level hospice.  Made patient DNR level 3  2/6 spoke with patient's niece Rachelle and made aware of patient's current blood pressure, bradycardia etc.  Rachelle requests we discontinue all prehospital medications and focus on comfort only with pain medication and anxiety medication.  I have discontinued all oral medications, will allow pleasure feed.  Added PRNs for morphine and Ativan.  Case management is continuing to work on hospice placement at [facility]  Patient is currently on comfort measures as of 2/6.  Accepted to hospice services at [facility].  Case management has made numerous referrals and are having some difficulty due to patient's BMI.  Will discharge when arrangements finalized  Change in mental status  Patient having acute mental status changes 1/31  UA positive-see plan for acute cystitis without hematuria  Transferred to [facility] 1/31 for stat head CT-negative for acute intracranial abnormality  Per discussion with nephew at patient had confusion with Oxy in the past-discontinued  Continues with occasional waxing and waning mentation  Reorient as needed  See goals of care discussion  Transitioned to full comfort measures 2/6  Acute cystitis without hematuria  UA positive  Acute mental status changes started 1/31 see plan for same  Patient has numerous antibiotic allergies  S/P treatment with cefepime and vancomycin, monitored off of antibiotics since 2/4  Appreciate input of ID   Patient is being transitioned to comfort care as of 2/6  Metabolic encephalopathy  Due to acute cystitis see plan for same  CT head negative  See goals of care discussion  Transitioned  to full comfort measures 2/6  LARRY (acute kidney injury)  (HCC)  Patient is on Lasix at home has been working peripherally with the team  Patient required midodrine 10 mg 3 times daily to help support blood pressure so Lasix could be given, and was still running hypotensive  Appreciate input of nephrology  Ongoing goals of care discussion-see plan to proceed  Transitioned to full comfort measures 2/6-no further monitoring of labs, testing or treatments  Hypotension  Blood pressure results   Was requiring midodrine for diuresis, all discontinued on 2/6 when transition to comfort measures  See goals of care discussion  Stage 3a chronic kidney disease (HCC)  Lab Results   Component Value Date    EGFR 31 02/05/2025    EGFR 30 02/04/2025    EGFR 34 02/03/2025    CREATININE 1.62 (H) 02/05/2025    CREATININE 1.66 (H) 02/04/2025    CREATININE 1.49 (H) 02/03/2025     Appreciate input of nephrology who followed  Lasix has been on hold as of 2/6  See goals of care discussion  Transitioned to full comfort measures 2/6  No further labs testing or treatments  Bradycardia  Patient noted to have bradycardia overnight 2/5 with heart rate in the 40s transferred to telemetry  Afib with slow ventricular response heart rate 50s on monitor  Appreciate input of cardiology  Ongoing goals of care discussion-see plan for same  Telemetry discontinued 2/5  Transitioned to full comfort measures 2/6  Comfort measures only status  See plan for goals of care  Had lengthy discussion with patient's niece Rachelle via the phone 2/6.  Hospice liaison deemed patient appropriate for SNF level hospice 2/5.  States given patient's BMI it is going to be difficult to get her into a skilled facility for hospice.  Case management continues to work on this process.  In the interim at the request that we discontinue all oral medications, no further labs testing or treatments and provide as needed comfort medications for pain and anxiety which I have ordered.  Changed status to DNR level 4 comfort measures as of  2/6  Referrals have been made to SNF facilities with hospice services.  Patient is having difficulty due to patient's BMI.  Will transfer to facility when arrangements finalized by case management    VTE Pharmacologic Prophylaxis: VTE Score: 8 High Risk (Score >/= 5) - Pharmacological DVT Prophylaxis Ordered: warfarin (Coumadin). Sequential Compression Devices Ordered.    Mobility:   Basic Mobility Inpatient Raw Score: 6  JH-HLM Goal: 2: Bed activities/Dependent transfer  JH-HLM Achieved: 2: Bed activities/Dependent transfer  JH-HLM Goal achieved. Continue to encourage appropriate mobility.    Patient Centered Rounds: I performed bedside rounds with nursing staff today.   Discussions with Specialists or Other Care Team Provider:  nursing, case management  Education and Discussions with Family / Patient: Updated  (niece) via phone.    Current Length of Stay: 13 day(s)  Current Patient Status: Inpatient   Certification Statement: The patient will continue to require additional inpatient hospital stay due to coordination of care      Discharge Plan:  Awaiting SNF for hospice-referrals have been made by case management.  Will discharge when arrangements finalized.    Code Status: Level 4 - Comfort Care    Subjective   Sleepy today, denies discomfort when asked    Objective :  Temp:  [97.4 °F (36.3 °C)] 97.4 °F (36.3 °C)  HR:  [63-69] 69  BP: (106-112)/(59-60) 112/60  Resp:  [2-22] 22  SpO2:  [94 %-97 %] 94 %  O2 Device: Nasal cannula  Nasal Cannula O2 Flow Rate (L/min):  [3 L/min] 3 L/min    Body mass index is 81.15 kg/m².     Input and Output Summary (last 24 hours):     Intake/Output Summary (Last 24 hours) at 2/9/2025 1223  Last data filed at 2/9/2025 0834  Gross per 24 hour   Intake 180 ml   Output 400 ml   Net -220 ml       Physical Exam  Vitals reviewed.   Constitutional:       General: She is not in acute distress.     Appearance: She is well-developed. She is obese. She is ill-appearing.   HENT:       Head: Normocephalic and atraumatic.   Cardiovascular:      Rate and Rhythm: Bradycardia present. Rhythm irregular.      Pulses: Normal pulses.      Heart sounds: Normal heart sounds. No murmur heard.  Pulmonary:      Effort: Pulmonary effort is normal. No respiratory distress.      Breath sounds: No wheezing or rales.      Comments: Nonlabored respirations at rest on O2 3 L nasal cannula, decreased breath sounds bilateral bases, no cough or shortness of breath  Abdominal:      General: There is no distension.      Palpations: Abdomen is soft.      Tenderness: There is no abdominal tenderness. There is no guarding.   Musculoskeletal:         General: Swelling present.      Right lower leg: Edema present.      Left lower leg: Edema present.   Skin:     General: Skin is warm and dry.      Capillary Refill: Capillary refill takes less than 2 seconds.   Neurological:      Mental Status: She is alert and oriented to person, place, and time. Mental status is at baseline.           Lines/Drains:  Lines/Drains/Airways       Active Status       Name Placement date Placement time Site Days    Urethral Catheter 16 Fr. 02/06/25  2330  --  2                              Lab Results: I have reviewed the following results:   Results from last 7 days   Lab Units 02/05/25  0610   WBC Thousand/uL 10.93*   HEMOGLOBIN g/dL 9.2*   HEMATOCRIT % 33.2*   PLATELETS Thousands/uL 356   SEGS PCT % 78*   LYMPHO PCT % 10*   MONO PCT % 6   EOS PCT % 4     Results from last 7 days   Lab Units 02/05/25  0610   SODIUM mmol/L 141   POTASSIUM mmol/L 3.8   CHLORIDE mmol/L 104   CO2 mmol/L 31   BUN mg/dL 40*   CREATININE mg/dL 1.62*   ANION GAP mmol/L 6   CALCIUM mg/dL 7.5*   ALBUMIN g/dL 2.8*   TOTAL BILIRUBIN mg/dL 0.48   ALK PHOS U/L 59   ALT U/L 4*   AST U/L 8*   GLUCOSE RANDOM mg/dL 89     Results from last 7 days   Lab Units 02/06/25  0406   INR  2.80*     Results from last 7 days   Lab Units 02/04/25  2131   POC GLUCOSE mg/dl 121                  Recent Cultures (last 7 days):           Imaging Results Review: I reviewed radiology reports from this admission including: CT abdomen pelvis, CT head.  Other Study Results Review: No additional pertinent studies reviewed.    Last 24 Hours Medication List:     Current Facility-Administered Medications:     diphenhydrAMINE (BENADRYL) injection 50 mg, Q6H PRN    LORazepam (ATIVAN) tablet 1 mg, Q3H PRN    morphine injection 2 mg, Q3H PRN    Administrative Statements   Today, Patient Was Seen By: ORACIO Last  I have spent a total time of 36 minutes in caring for this patient on the day of the visit/encounter including Documenting in the medical record and Communicating with other healthcare professionals .    **Please Note: This note may have been constructed using a voice recognition system.**

## 2025-02-09 NOTE — ASSESSMENT & PLAN NOTE
Patient having acute mental status changes 1/31  UA positive-see plan for acute cystitis without hematuria  Transferred to [facility] 1/31 for stat head CT-negative for acute intracranial abnormality  Per discussion with nephew at patient had confusion with Oxy in the past-discontinued  Continues with occasional waxing and waning mentation  Reorient as needed  See goals of care discussion  Transitioned to full comfort measures 2/6

## 2025-02-09 NOTE — PLAN OF CARE
Problem: Prexisting or High Potential for Compromised Skin Integrity  Goal: Skin integrity is maintained or improved  Description: INTERVENTIONS:  - Identify patients at risk for skin breakdown  - Assess and monitor skin integrity  - Assess and monitor nutrition and hydration status  - Monitor labs   - Assess for incontinence   - Turn and reposition patient  - Assist with mobility/ambulation  - Relieve pressure over bony prominences  - Avoid friction and shearing  - Provide appropriate hygiene as needed including keeping skin clean and dry  - Evaluate need for skin moisturizer/barrier cream  - Collaborate with interdisciplinary team   - Patient/family teaching  - Consider wound care consult   Outcome: Progressing     Problem: PAIN - ADULT  Goal: Verbalizes/displays adequate comfort level or baseline comfort level  Description: Interventions:  - Encourage patient to monitor pain and request assistance  - Assess pain using appropriate pain scale  - Administer analgesics based on type and severity of pain and evaluate response  - Implement non-pharmacological measures as appropriate and evaluate response  - Consider cultural and social influences on pain and pain management  - Notify physician/advanced practitioner if interventions unsuccessful or patient reports new pain  Outcome: Progressing     Problem: Excess Fluid Volume  Goal: Patient is able to achieve and maintain homeostasis  Description: INTERVENTIONS: Monitor for sign and symptoms of fluid overload  - Evaluate LE edema every shift  - Elevate LE to prevent dependent edema  - Apply ALONDRA stockings as ordered   - Monitor ankle circumference daily  - Assess for jugular vein distention  - Evaluate provider orders for the CHF diuretic algorithm. Administer diuretics as ordered  - Weigh the patient daily at 0600 and report a weight gain of five pounds or more   - Strict intake and output  - Monitor fluid intake and adhere to fluid restrictions  - Assess lung sounds  every shift and as needed  - Monitor vital signs and lab values (CBC, chem, BUN, BNP)  - Measure and document urine output    Outcome: Progressing

## 2025-02-09 NOTE — ASSESSMENT & PLAN NOTE
See plan for goals of care  Had lengthy discussion with patient's niece Rachelle via the phone 2/6.  Hospice liaison deemed patient appropriate for SNF level hospice 2/5.  States given patient's BMI it is going to be difficult to get her into a skilled facility for hospice.  Case management continues to work on this process.  In the interim at the request that we discontinue all oral medications, no further labs testing or treatments and provide as needed comfort medications for pain and anxiety which I have ordered.  Changed status to DNR level 4 comfort measures as of 2/6  Referrals have been made to SNF facilities with hospice services.  Patient is having difficulty due to patient's BMI.  Will transfer to facility when arrangements finalized by case management

## 2025-02-09 NOTE — ASSESSMENT & PLAN NOTE
Patient noted to have bradycardia overnight 2/5 with heart rate in the 40s transferred to telemetry  Afib with slow ventricular response heart rate 50s on monitor  Appreciate input of cardiology  Ongoing goals of care discussion-see plan for same  Telemetry discontinued 2/5  Transitioned to full comfort measures 2/6

## 2025-02-09 NOTE — ASSESSMENT & PLAN NOTE
Patient is on Lasix at home has been working peripherally with the team  Patient required midodrine 10 mg 3 times daily to help support blood pressure so Lasix could be given, and was still running hypotensive  Appreciate input of nephrology  Ongoing goals of care discussion-see plan to proceed  Transitioned to full comfort measures 2/6-no further monitoring of labs, testing or treatments

## 2025-02-09 NOTE — ASSESSMENT & PLAN NOTE
Wt Readings from Last 3 Encounters:   02/06/25 (!) 208 kg (458 lb 1.9 oz)   01/17/25 (!) 205 kg (451 lb)   01/01/25 (!) 205 kg (451 lb 14.4 oz)     11/7/2024 TTE: LVEF 60% with normal systolic function  Outpatient regimen includes furosemide 60 mg twice daily   Noncompliant with outpatient Lasix due to hypotension  Initially diuresing with IV Lasix, then transitioned to oral home regimen-discontinued as of 2/6  Given LARRY nephrology was consulted, appreciate input  See goals of care discussion  Transitioned to full comfort measures 2/6

## 2025-02-10 PROCEDURE — 99232 SBSQ HOSP IP/OBS MODERATE 35: CPT | Performed by: NURSE PRACTITIONER

## 2025-02-10 NOTE — ASSESSMENT & PLAN NOTE
POA with redness and drainage of bilateral pan eyes similar to admissions  CT imaging with no obvious abscess  Patient was not candidate for panniculectomy in the past  S/p cefepime and vancomycin IV  Infectious disease input appreciated  Transitioned to comfort measures only 2/6/2025

## 2025-02-10 NOTE — ASSESSMENT & PLAN NOTE
UA positive  Patient with numerous antibiotic allergies  S/P treatment with cefepime and vancomycin  Infectious disease input appreciated   Transitioned to comfort measures only 2/6/2025

## 2025-02-10 NOTE — ASSESSMENT & PLAN NOTE
"Lab Results   Component Value Date    HGBA1C 7.2 (H) 11/06/2024       No results for input(s): \"POCGLU\" in the last 72 hours.        Blood Sugar Average: Last 72 hrs:    Patient denies any history of diabetes  Transitioned to full comfort measures 2/6/2025, allow pleasure feed  No further labs, testing, or treatments  "

## 2025-02-10 NOTE — PROGRESS NOTES
"Progress Note - Hospitalist   Name: Zee Kirk 74 y.o. female I MRN: [id number]  Unit/Bed#: -[facility] I Date of Admission: 1/26/2025   Date of Service: 2/10/2025 I Hospital Day: 14    Assessment & Plan  Panniculitis  POA with redness and drainage of bilateral pan eyes similar to admissions  CT imaging with no obvious abscess  Patient was not candidate for panniculectomy in the past  S/p cefepime and vancomycin IV  Infectious disease input appreciated  Transitioned to comfort measures only 2/6/2025  History of Clostridioides difficile infection  Was receiving oral vancomycin, discontinued 2/6 as patient transition to CMO  Hypothyroid  Levothyroxine discontinued as patient transitioned to CMO status  Class 3 severe obesity due to excess calories with serious comorbidity and body mass index (BMI) greater than or equal to 70 in adult ([facility])  BMI 81.15  Longstanding persistent atrial fibrillation ([facility])  Heart rate remains 40s to 50s  Was receiving Coumadin for anticoagulation-discontinued 2/6/2025 after discussion with the family  Cardiology input appreciated  Transitioned to comfort measures only as of 2/6/2025  No further labs, testing, or treatments  Chronic heart failure with preserved ejection fraction ([facility])  Wt Readings from Last 3 Encounters:   02/06/25 (!) 208 kg (458 lb 1.9 oz)   01/17/25 (!) 205 kg (451 lb)   01/01/25 (!) 205 kg (451 lb 14.4 oz)     11/7/2024 TTE: LVEF 60% with normal systolic function  Outpatient regimen includes furosemide 60 mg twice daily   Nonadherent with outpatient Lasix due to hypotension  Nephrology input appreciated   Transitioned to comfort measures only 2/6/2025  Type 2 diabetes mellitus without complication, without long-term current use of insulin ([facility])  Lab Results   Component Value Date    HGBA1C 7.2 (H) 11/06/2024       No results for input(s): \"POCGLU\" in the last 72 hours.        Blood Sugar Average: Last 72 hrs:    Patient denies any history of " diabetes  Transitioned to full comfort measures 2/6/2025, allow pleasure feed  No further labs, testing, or treatments  Goals of care, counseling/discussion  Goals of care discussion initiated  Patient has developed encephalopathy and bradycardia  Hospice evaluation appreciated   Case management following for postacute placement   Patient has transitioned to comfort measures only 2/6/2025  Change in mental status  Patient having acute mental status changes 1/31/2025  UA positive-see plan for acute cystitis without hematuria  Transferred to [facility] 1/31/2025 for stat head CT-negative for acute intracranial abnormality  Per family, patient had confusion with Oxy in the past, this was discontinued  Transition to comfort measures only 2/6/2025  Acute cystitis without hematuria  UA positive  Patient with numerous antibiotic allergies  S/P treatment with cefepime and vancomycin  Infectious disease input appreciated   Transitioned to comfort measures only 2/6/2025   Metabolic encephalopathy  Due to acute cystitis  CT head negative  Transition to comfort measures only 2/6/2025  LARRY (acute kidney injury) (HCC)  Patient on Lasix at home and was requiring blood pressure support with midodrine so Lasix could be given   Nephrology input appreciated  Has transitioned to comfort measures only 2/6/2025  Hypotension  Previously on midodrine, discontinued since transitioning to comfort measures only status  Bradycardia  Noted to have bradycardia with heart rate in the 40s   Cardiology input appreciated   Please see also, goals of care discussion   Transition to comfort measures only 2/6/2025   Comfort measures only status  See also, plan for goals of care  Patient is transition to comfort measures only  Oral medications discontinued, no further labs, testing, or treatments  Comfort medications ordered  Referrals have been made to SNF facilities with hospice services.  Will transfer to facility when arrangements finalized by  case management    VTE Pharmacologic Prophylaxis: VTE Score: 8  CMO    Mobility:   Basic Mobility Inpatient Raw Score: 6  JH-HLM Goal: 2: Bed activities/Dependent transfer  JH-HLM Achieved: 1: Laying in bed  JH-HLM Goal NOT achieved. Continue with multidisciplinary rounding and encourage appropriate mobility to improve upon JH-HLM goals.    Patient Centered Rounds: I performed bedside rounds with nursing staff today.   Discussions with Specialists or Other Care Team Provider: CM    Education and Discussions with Family / Patient:  No medical updates to provide at this time..     Current Length of Stay: 14 day(s)  Current Patient Status: Inpatient   Certification Statement: The patient will continue to require additional inpatient hospital stay due to care coordination.   Discharge Plan: Anticipate discharge in 24-48 hrs to SNF/hospice    Code Status: Level 4 - Comfort Care    Subjective   Plaints offered, no overnight events.    Objective :  Temp:  [98.5 °F (36.9 °C)] 98.5 °F (36.9 °C)  HR:  [77] 77  BP: (115)/(70) 115/70  Resp:  [17] 17  SpO2:  [96 %] 96 %  O2 Device: Nasal cannula  Nasal Cannula O2 Flow Rate (L/min):  [3 L/min] 3 L/min    Body mass index is 81.15 kg/m².     Input and Output Summary (last 24 hours):     Intake/Output Summary (Last 24 hours) at 2/10/2025 1757  Last data filed at 2/10/2025 0900  Gross per 24 hour   Intake 250 ml   Output 400 ml   Net -150 ml       Physical Exam  Constitutional:       General: She is not in acute distress.     Appearance: She is obese.   Eyes:      Extraocular Movements: Extraocular movements intact.   Cardiovascular:      Rate and Rhythm: Regular rhythm. Bradycardia present.      Pulses: Normal pulses.   Pulmonary:      Effort: No respiratory distress.      Breath sounds: No wheezing or rhonchi.   Abdominal:      Tenderness: There is no abdominal tenderness.   Musculoskeletal:      Right lower leg: Edema present.      Left lower leg: Edema present.   Skin:      General: Skin is warm.   Neurological:      Mental Status: Mental status is at baseline.           Lines/Drains:  Lines/Drains/Airways       Active Status       Name Placement date Placement time Site Days    Urethral Catheter 16 Fr. 02/06/25  2330  --  3                  Urinary Catheter:  Goal for removal: N/A - Chronic [place]                 Lab Results: I have reviewed the following results:   Results from last 7 days   Lab Units 02/05/25  0610   WBC Thousand/uL 10.93*   HEMOGLOBIN g/dL 9.2*   HEMATOCRIT % 33.2*   PLATELETS Thousands/uL 356   SEGS PCT % 78*   LYMPHO PCT % 10*   MONO PCT % 6   EOS PCT % 4     Results from last 7 days   Lab Units 02/05/25  0610   SODIUM mmol/L 141   POTASSIUM mmol/L 3.8   CHLORIDE mmol/L 104   CO2 mmol/L 31   BUN mg/dL 40*   CREATININE mg/dL 1.62*   ANION GAP mmol/L 6   CALCIUM mg/dL 7.5*   ALBUMIN g/dL 2.8*   TOTAL BILIRUBIN mg/dL 0.48   ALK PHOS U/L 59   ALT U/L 4*   AST U/L 8*   GLUCOSE RANDOM mg/dL 89     Results from last 7 days   Lab Units 02/06/25  0406   INR  2.80*     Results from last 7 days   Lab Units 02/04/25  2131   POC GLUCOSE mg/dl 121               Recent Cultures (last 7 days):               Last 24 Hours Medication List:     Current Facility-Administered Medications:     diphenhydrAMINE (BENADRYL) injection 50 mg, Q6H PRN    LORazepam (ATIVAN) tablet 1 mg, Q3H PRN    morphine injection 2 mg, Q3H PRN    Administrative Statements   Today, Patient Was Seen By: ORACIO Gardner      **Please Note: This note may have been constructed using a voice recognition system.**

## 2025-02-10 NOTE — ASSESSMENT & PLAN NOTE
Heart rate remains 40s to 50s  Was receiving Coumadin for anticoagulation-discontinued 2/6/2025 after discussion with the family  Cardiology input appreciated  Transitioned to comfort measures only as of 2/6/2025  No further labs, testing, or treatments

## 2025-02-10 NOTE — PLAN OF CARE
Problem: Knowledge Deficit  Goal: Patient is able to verbalize understanding of Heart Failure after education  Description: INTERVENTIONS:  - Educate the patient and family on signs and symptoms of HF  - Provide the patient with HF education and HF zone tool  - Educate on the importance of daily weight in the AM and reporting a weight gain               of 3 or more pounds to their primary care physician  - Monitor for SOB  - Maintain and sodium and fluid restriction  - Educate the patient on the importance of medications such as: diuretics, betablockers,               antiarrhythmics and their purpose, dose, route, side effects and labs               if they are needed    Outcome: Progressing     Problem: Activity Intolerance  Goal: Patient is able to perform activities within their limitations  Description: INTERVENTIONS:                       -   Alternate periods of activity with periods of rest                 -   Patients is able to maintain normal vitals heart rhythm during activity                 -   Gradually increase activity and exercise as patient can tolerate                 -   Monitor blood pressure and heart before and after exercise                  -   Monitor blood oxygen saturation during activity and apply oxygen as needed    Outcome: Progressing     Problem: Excess Fluid Volume  Goal: Patient is able to achieve and maintain homeostasis  Description: INTERVENTIONS: Monitor for sign and symptoms of fluid overload  - Evaluate LE edema every shift  - Elevate LE to prevent dependent edema  - Apply ALONDRA stockings as ordered   - Monitor ankle circumference daily  - Assess for jugular vein distention  - Evaluate provider orders for the CHF diuretic algorithm. Administer diuretics as ordered  - Weigh the patient daily at 0600 and report a weight gain of five pounds or more   - Strict intake and output  - Monitor fluid intake and adhere to fluid restrictions  - Assess lung sounds every shift and as  needed  - Monitor vital signs and lab values (CBC, chem, BUN, BNP)  - Measure and document urine output    Outcome: Progressing

## 2025-02-10 NOTE — ASSESSMENT & PLAN NOTE
Goals of care discussion initiated  Patient has developed encephalopathy and bradycardia  Hospice evaluation appreciated   Case management following for postacute placement   Patient has transitioned to comfort measures only 2/6/2025

## 2025-02-10 NOTE — ASSESSMENT & PLAN NOTE
Noted to have bradycardia with heart rate in the 40s   Cardiology input appreciated   Please see also, goals of care discussion   Transition to comfort measures only 2/6/2025

## 2025-02-10 NOTE — ASSESSMENT & PLAN NOTE
Wt Readings from Last 3 Encounters:   02/06/25 (!) 208 kg (458 lb 1.9 oz)   01/17/25 (!) 205 kg (451 lb)   01/01/25 (!) 205 kg (451 lb 14.4 oz)     11/7/2024 TTE: LVEF 60% with normal systolic function  Outpatient regimen includes furosemide 60 mg twice daily   Nonadherent with outpatient Lasix due to hypotension  Nephrology input appreciated   Transitioned to comfort measures only 2/6/2025

## 2025-02-10 NOTE — ASSESSMENT & PLAN NOTE
Patient on Lasix at home and was requiring blood pressure support with midodrine so Lasix could be given   Nephrology input appreciated  Has transitioned to comfort measures only 2/6/2025

## 2025-02-10 NOTE — ASSESSMENT & PLAN NOTE
Patient having acute mental status changes 1/31/2025  UA positive-see plan for acute cystitis without hematuria  Transferred to [facility] 1/31/2025 for stat head CT-negative for acute intracranial abnormality  Per family, patient had confusion with Oxy in the past, this was discontinued  Transition to comfort measures only 2/6/2025

## 2025-02-10 NOTE — ASSESSMENT & PLAN NOTE
See also, plan for goals of care  Patient is transition to comfort measures only  Oral medications discontinued, no further labs, testing, or treatments  Comfort medications ordered  Referrals have been made to SNF facilities with hospice services.  Will transfer to facility when arrangements finalized by case management

## 2025-02-11 PROCEDURE — 99231 SBSQ HOSP IP/OBS SF/LOW 25: CPT

## 2025-02-11 RX ORDER — ACETAMINOPHEN 325 MG/1
650 TABLET ORAL EVERY 6 HOURS PRN
Status: DISCONTINUED | OUTPATIENT
Start: 2025-02-11 | End: 2025-02-21 | Stop reason: HOSPADM

## 2025-02-11 RX ADMIN — ACETAMINOPHEN 650 MG: 325 TABLET ORAL at 11:11

## 2025-02-11 NOTE — CASE MANAGEMENT
Case Management Discharge Planning Note    Patient name Zee Kirk  Location /-01 MRN [id number]  : 1950 Date 2/10/2025       Current Admission Date: 2025  Current Admission Diagnosis:Comfort measures only status   Patient Active Problem List    Diagnosis Date Noted Date Diagnosed    Comfort measures only status 2025     Bradycardia 2025     Stage 3a chronic kidney disease ([facility]) 2025     Metabolic encephalopathy 2025     Acute cystitis without hematuria 2025     Change in mental status 2025     Goals of care, counseling/discussion 2025     Bilateral lower extremity edema 2024     Hypotension 2024     Weakness 2024     Paroxysmal A-fib ([facility]) 2024     History of kidney stones 2024     Skin abnormalities 2024     Cataract, nuclear sclerotic senile, right 2024     Chronic heart failure with preserved ejection fraction ([facility]) 2024     Hypoxic episode 2024     Type 2 diabetes mellitus without complication, without long-term current use of insulin ([facility]) 2024     Long term (current) use of anticoagulants 2024     Subtherapeutic international normalized ratio (INR) 2024     Multiple open wounds 2024     Left leg pain 2023     Shingles 2023     Cellulitis- Ruled Out 2023     Longstanding persistent atrial fibrillation ([facility]) 2022     History of Clostridioides difficile infection 2022     Hypothyroid 10/03/2020     Mild episode of recurrent major depressive disorder ([facility]) 10/03/2020     Idiopathic chronic 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 10/03/2020     Class 3 severe obesity due to excess calories with serious comorbidity and body mass index (BMI) greater than or equal to 70 in adult ([facility]) 10/03/2020     Panniculitis 10/03/2020     Gastroesophageal reflux disease without esophagitis 10/03/2020     Hx MRSA infection 2020     Impaired  mobility 02/03/2019     Osteoarthritis of glenohumeral joint 02/03/2019     Left ovarian cyst 09/20/2017     Depression with anxiety 07/15/2017     Anemia 12/28/2016     LARRY (acute kidney injury) (HCC) 12/27/2016     Adjustment disorder 09/05/2013     Irritable bowel syndrome 12/04/2012     Left atrial enlargement 12/04/2012     Left ventricular hypertrophy 12/04/2012     Carpal tunnel syndrome 05/30/2012     Gout 05/30/2012     Hyperlipidemia 05/30/2012     Symptomatic menopausal or female climacteric states 05/30/2012       LOS (days): 14  Geometric Mean LOS (GMLOS) (days): 4.4  Days to GMLOS:-9.7     OBJECTIVE:  Risk of Unplanned Readmission Score: 28.56         Current admission status: Inpatient   Preferred Pharmacy:   [facility] [place] [facility] - [facility]  [place] [address]  Phone: [phone] Fax: [phone]    Primary Care Provider: Franklyn Caruso MD    Primary Insurance: MEDICARE  Secondary Insurance: [facility]    DISCHARGE DETAILS:    Discharge planning discussed with:: called Pravin 12:52 pm  Freedom of Choice: Yes  Comments - Freedom of Choice: pt is on comfort care- additional referrals have been sent via aidin- pt has been approved for  snf hospice care or home hospice - pt does not have 24 hrs caregivers-  pt needs a bariatric bed-  cm sent additional referrals  -pt's wt. exceeds most of the weight limits of the facilities  CM contacted family/caregiver?: Yes             Contacts  Patient Contacts: [facility]  Relationship to Patient:: Family (niece)  Contact Method: Phone  Phone Number: [phone]  Reason/Outcome: Discharge Planning    Requested Home Health Care         Is the patient interested in HHC at discharge?: No    DME Referral Provided  Referral made for DME?: No    Other Referral/Resources/Interventions Provided:  Interventions: SNF, Hospice  Referral Comments: pt is on comfort care-  additional snf referrals sent for snf hospice care-          Treatment Team Recommendation: SNF (snf on hospice - sent referrals - BLS)

## 2025-02-11 NOTE — PROGRESS NOTES
"Progress Note - Hospitalist   Name: Zee Kirk 74 y.o. female I MRN: [id number]  Unit/Bed#: -[facility] I Date of Admission: 1/26/2025   Date of Service: 2/11/2025 I Hospital Day: 15    Assessment & Plan  Panniculitis  POA with redness and drainage of bilateral pan eyes similar to admissions  CT imaging with no obvious abscess  Patient was not candidate for panniculectomy in the past  S/p cefepime and vancomycin IV  Infectious disease input appreciated  Transitioned to comfort measures only 2/6/2025  History of Clostridioides difficile infection  Was receiving oral vancomycin, discontinued 2/6 as patient transition to CMO  Hypothyroid  Levothyroxine discontinued as patient transitioned to CMO status  Class 3 severe obesity due to excess calories with serious comorbidity and body mass index (BMI) greater than or equal to 70 in adult ([facility])  BMI 81.15  Longstanding persistent atrial fibrillation ([facility])  Heart rate remains 40s to 50s  Was receiving Coumadin for anticoagulation-discontinued 2/6/2025 after discussion with the family  Cardiology input appreciated  Transitioned to comfort measures only as of 2/6/2025  No further labs, testing, or treatments  Chronic heart failure with preserved ejection fraction ([facility])  Wt Readings from Last 3 Encounters:   02/06/25 (!) 208 kg (458 lb 1.9 oz)   01/17/25 (!) 205 kg (451 lb)   01/01/25 (!) 205 kg (451 lb 14.4 oz)     11/7/2024 TTE: LVEF 60% with normal systolic function  Outpatient regimen includes furosemide 60 mg twice daily   Nonadherent with outpatient Lasix due to hypotension  Nephrology input appreciated   Transitioned to comfort measures only 2/6/2025  Type 2 diabetes mellitus without complication, without long-term current use of insulin ([facility])  Lab Results   Component Value Date    HGBA1C 7.2 (H) 11/06/2024       No results for input(s): \"POCGLU\" in the last 72 hours.        Blood Sugar Average: Last 72 hrs:    Patient denies any history of " diabetes  Transitioned to full comfort measures 2/6/2025, allow pleasure feed  No further labs, testing, or treatments  Goals of care, counseling/discussion  Goals of care discussion initiated  Patient has developed encephalopathy and bradycardia  Hospice evaluation appreciated   Case management following for postacute placement   Patient has transitioned to comfort measures only 2/6/2025  Change in mental status  Patient having acute mental status changes 1/31/2025  UA positive-see plan for acute cystitis without hematuria  Transferred to [facility] 1/31/2025 for stat head CT-negative for acute intracranial abnormality  Per family, patient had confusion with Oxy in the past, this was discontinued  Transition to comfort measures only 2/6/2025  Acute cystitis without hematuria  UA positive  Patient with numerous antibiotic allergies  S/P treatment with cefepime and vancomycin  Infectious disease input appreciated   Transitioned to comfort measures only 2/6/2025   Metabolic encephalopathy  Due to acute cystitis  CT head negative  Transition to comfort measures only 2/6/2025  LARRY (acute kidney injury) (HCC)  Patient on Lasix at home and was requiring blood pressure support with midodrine so Lasix could be given   Nephrology input appreciated  Has transitioned to comfort measures only 2/6/2025  Hypotension  Previously on midodrine, discontinued since transitioning to comfort measures only status  Bradycardia  Noted to have bradycardia with heart rate in the 40s   Cardiology input appreciated   Please see also, goals of care discussion   Transition to comfort measures only 2/6/2025   Comfort measures only status  See also, plan for goals of care  Patient is transition to comfort measures only  Oral medications discontinued, no further labs, testing, or treatments  Comfort medications ordered  Referrals have been made to SNF facilities with hospice services.  Will transfer to facility when arrangements finalized by  case management    VTE Pharmacologic Prophylaxis: VTE Score: 8  no VTE prophylaxis on comfort measures    Mobility:   Basic Mobility Inpatient Raw Score: 6  JH-HLM Goal: 2: Bed activities/Dependent transfer  JH-HLM Achieved: 2: Bed activities/Dependent transfer  JH-HLM Goal achieved. Continue to encourage appropriate mobility.    Patient Centered Rounds: I performed bedside rounds with nursing staff today.   Discussions with Specialists or Other Care Team Provider:  nursing, case management  Education and Discussions with Family / Patient: Updated  (niece) via phone.    Current Length of Stay: 15 day(s)  Current Patient Status: Inpatient   Certification Statement: The patient will continue to require additional inpatient hospital stay due to coordination of care      Discharge Plan:  Awaiting SNF for hospice-referrals have been made by case management.  Will discharge when arrangements finalized.    Code Status: Level 4 - Comfort Care    Subjective   Sleepy today, denies discomfort when asked    Objective :  HR:  [61-67] 61  BP: (116)/(57) 116/57  Resp:  [16-18] 16  SpO2:  [94 %-99 %] 94 %  O2 Device: Nasal cannula  Nasal Cannula O2 Flow Rate (L/min):  [3 L/min] 3 L/min    Body mass index is 81.15 kg/m².     Input and Output Summary (last 24 hours):     Intake/Output Summary (Last 24 hours) at 2/11/2025 1633  Last data filed at 2/11/2025 0501  Gross per 24 hour   Intake --   Output 500 ml   Net -500 ml       Physical Exam  Vitals reviewed.   Constitutional:       General: She is not in acute distress.     Appearance: She is well-developed. She is obese. She is ill-appearing.   HENT:      Head: Normocephalic and atraumatic.   Cardiovascular:      Rate and Rhythm: Bradycardia present. Rhythm irregular.      Pulses: Normal pulses.      Heart sounds: Normal heart sounds. No murmur heard.  Pulmonary:      Effort: Pulmonary effort is normal. No respiratory distress.      Breath sounds: No wheezing or rales.       Comments: Nonlabored respirations at rest on O2 3 L nasal cannula, decreased breath sounds bilateral bases, no cough or shortness of breath  Abdominal:      General: There is no distension.      Palpations: Abdomen is soft.      Tenderness: There is no abdominal tenderness. There is no guarding.   Musculoskeletal:         General: Swelling present.      Right lower leg: Edema present.      Left lower leg: Edema present.   Skin:     General: Skin is warm and dry.      Capillary Refill: Capillary refill takes less than 2 seconds.   Neurological:      Mental Status: She is alert and oriented to person, place, and time. Mental status is at baseline.           Lines/Drains:  Lines/Drains/Airways       Active Status       Name Placement date Placement time Site Days    Urethral Catheter 16 Fr. 02/06/25  2330  --  4                              Lab Results: I have reviewed the following results:   Results from last 7 days   Lab Units 02/05/25  0610   WBC Thousand/uL 10.93*   HEMOGLOBIN g/dL 9.2*   HEMATOCRIT % 33.2*   PLATELETS Thousands/uL 356   SEGS PCT % 78*   LYMPHO PCT % 10*   MONO PCT % 6   EOS PCT % 4     Results from last 7 days   Lab Units 02/05/25  0610   SODIUM mmol/L 141   POTASSIUM mmol/L 3.8   CHLORIDE mmol/L 104   CO2 mmol/L 31   BUN mg/dL 40*   CREATININE mg/dL 1.62*   ANION GAP mmol/L 6   CALCIUM mg/dL 7.5*   ALBUMIN g/dL 2.8*   TOTAL BILIRUBIN mg/dL 0.48   ALK PHOS U/L 59   ALT U/L 4*   AST U/L 8*   GLUCOSE RANDOM mg/dL 89     Results from last 7 days   Lab Units 02/06/25  0406   INR  2.80*     Results from last 7 days   Lab Units 02/04/25  2131   POC GLUCOSE mg/dl 121                 Recent Cultures (last 7 days):           Imaging Results Review: I reviewed radiology reports from this admission including: CT abdomen pelvis, CT head.  Other Study Results Review: No additional pertinent studies reviewed.    Last 24 Hours Medication List:     Current Facility-Administered Medications:      acetaminophen (TYLENOL) tablet 650 mg, Q6H PRN    diphenhydrAMINE (BENADRYL) injection 50 mg, Q6H PRN    LORazepam (ATIVAN) tablet 1 mg, Q3H PRN    morphine injection 2 mg, Q3H PRN    Administrative Statements   Today, Patient Was Seen By: ORACIO Last  I have spent a total time of 37 minutes in caring for this patient on the day of the visit/encounter including Documenting in the medical record and Communicating with other healthcare professionals .    **Please Note: This note may have been constructed using a voice recognition system.**

## 2025-02-11 NOTE — CASE MANAGEMENT
Case Management Discharge Planning Note    Patient name Zee Kirk  Location /-01 MRN [id number]  : 1950 Date 2025       Current Admission Date: 2025  Current Admission Diagnosis:Comfort measures only status   Patient Active Problem List    Diagnosis Date Noted Date Diagnosed    Comfort measures only status 2025     Bradycardia 2025     Stage 3a chronic kidney disease ([facility]) 2025     Metabolic encephalopathy 2025     Acute cystitis without hematuria 2025     Change in mental status 2025     Goals of care, counseling/discussion 2025     Bilateral lower extremity edema 2024     Hypotension 2024     Weakness 2024     Paroxysmal A-fib ([facility]) 2024     History of kidney stones 2024     Skin abnormalities 2024     Cataract, nuclear sclerotic senile, right 2024     Chronic heart failure with preserved ejection fraction ([facility]) 2024     Hypoxic episode 2024     Type 2 diabetes mellitus without complication, without long-term current use of insulin ([facility]) 2024     Long term (current) use of anticoagulants 2024     Subtherapeutic international normalized ratio (INR) 2024     Multiple open wounds 2024     Left leg pain 2023     Shingles 2023     Cellulitis- Ruled Out 2023     Longstanding persistent atrial fibrillation ([facility]) 2022     History of Clostridioides difficile infection 2022     Hypothyroid 10/03/2020     Mild episode of recurrent major depressive disorder ([facility]) 10/03/2020     Idiopathic chronic 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 10/03/2020     Class 3 severe obesity due to excess calories with serious comorbidity and body mass index (BMI) greater than or equal to 70 in adult ([facility]) 10/03/2020     Panniculitis 10/03/2020     Gastroesophageal reflux disease without esophagitis 10/03/2020     Hx MRSA infection 2020     Impaired  mobility 02/03/2019     Osteoarthritis of glenohumeral joint 02/03/2019     Left ovarian cyst 09/20/2017     Depression with anxiety 07/15/2017     Anemia 12/28/2016     LARRY (acute kidney injury) (HCC) 12/27/2016     Adjustment disorder 09/05/2013     Irritable bowel syndrome 12/04/2012     Left atrial enlargement 12/04/2012     Left ventricular hypertrophy 12/04/2012     Carpal tunnel syndrome 05/30/2012     Gout 05/30/2012     Hyperlipidemia 05/30/2012     Symptomatic menopausal or female climacteric states 05/30/2012       LOS (days): 15  Geometric Mean LOS (GMLOS) (days): 4.4  Days to GMLOS:-10.6     OBJECTIVE:  Risk of Unplanned Readmission Score: 29.12         Current admission status: Inpatient   Preferred Pharmacy:   [facility] [place] - [address] [place] [address]  [place] [id number]  Phone: [phone] Fax: [phone]    Primary Care Provider: Franklyn Caruso MD    Primary Insurance: MEDICARE  Secondary Insurance: [facility]    DISCHARGE DETAILS:       Freedom of Choice: Yes  Comments - Freedom of Choice: pt on comfort care - cm expanded the milage and sent additonal snf referrals for hospice care                                         Treatment Team Recommendation: SNF (snf on hospcie care referrals sent- BLS)

## 2025-02-11 NOTE — ASSESSMENT & PLAN NOTE
Patient having acute mental status changes 1/31/2025  UA positive-see plan for acute cystitis without hematuria  Transferred to [facility] 1/31/2025 for stat head CT-negative for acute intracranial abnormality  Per family, patient had confusion with Oxy in the past, this was discontinued  Transition to comfort measures only 2/6/2025

## 2025-02-12 PROCEDURE — 99231 SBSQ HOSP IP/OBS SF/LOW 25: CPT

## 2025-02-12 RX ADMIN — LORAZEPAM 1 MG: 1 TABLET ORAL at 01:02

## 2025-02-12 NOTE — ASSESSMENT & PLAN NOTE
Wt Readings from Last 3 Encounters:   02/06/25 (!) 208 kg (458 lb 1.9 oz)   01/17/25 (!) 205 kg (451 lb)   01/01/25 (!) 205 kg (451 lb 14.4 oz)     11/7/2024 TTE: LVEF 60% with normal systolic function  Outpatient regimen includes furosemide 60 mg twice daily   Nonadherent with outpatient Lasix due to hypotension  Nephrology input appreciated   Lasix discontinued on 2/6 when transition to full comfort measures  Transitioned to comfort measures only 2/6/2025

## 2025-02-12 NOTE — ASSESSMENT & PLAN NOTE
Goals of care discussion initiated  Patient has developed encephalopathy and bradycardia  Hospice evaluation appreciated   Case management making referrals for SNF on hospice.  Search has been expanded.  Patient has transitioned to comfort measures only 2/6/2025

## 2025-02-12 NOTE — NURSING NOTE
Pt's skin care performed today, cleansed and new treatment applied. Ax2 to roll, no new skin issues to address.

## 2025-02-12 NOTE — NURSING NOTE
"Pt was heard screaming from room \"it hurts it hurts take it out now\". When I checked pt she stated the sadler was hurting so bad she don't care anymore she wants it out now. Removed sadler per pt request.  "

## 2025-02-12 NOTE — ASSESSMENT & PLAN NOTE
POA with redness and drainage of bilateral pan eyes similar to admissions  CT imaging with no obvious abscess  Patient was not candidate for panniculectomy in the past  S/p cefepime and vancomycin IV-now off of antibiotics  Infectious disease input appreciated  Transitioned to comfort measures only 2/6/2025

## 2025-02-12 NOTE — ASSESSMENT & PLAN NOTE
Patient having acute mental status changes 1/31/2025  UA positive-see plan for acute cystitis without hematuria  Transferred to [facility] 1/31/2025 for stat head CT-negative for acute intracranial abnormality  Per family, patient had confusion with Oxy in the past, this was discontinued  Continues with waxing and waning mentation  Transitioned to comfort measures only 2/6/2025

## 2025-02-12 NOTE — PROGRESS NOTES
"Progress Note - Hospitalist   Name: Zee Kirk 74 y.o. female I MRN: [id number]  Unit/Bed#: [facility] I Date of Admission: 1/26/2025   Date of Service: 2/12/2025 I Hospital Day: 16    Assessment & Plan  Panniculitis  POA with redness and drainage of bilateral pan eyes similar to admissions  CT imaging with no obvious abscess  Patient was not candidate for panniculectomy in the past  S/p cefepime and vancomycin IV-now off of antibiotics  Infectious disease input appreciated  Transitioned to comfort measures only 2/6/2025  History of Clostridioides difficile infection  Was receiving oral vancomycin, discontinued 2/6 as patient transition to CMO  Hypothyroid  Levothyroxine discontinued 2/6 when patient transition to comfort measures  Class 3 severe obesity due to excess calories with serious comorbidity and body mass index (BMI) greater than or equal to 70 in adult ([facility])  BMI 81.15  Longstanding persistent atrial fibrillation ([facility])  Heart rate remains 40s to 50s  Was receiving Coumadin for anticoagulation-discontinued 2/6/2025 after discussion with the family  Cardiology input appreciated  Transitioned to comfort measures only as of 2/6/2025  No further labs, testing, or treatments  Chronic heart failure with preserved ejection fraction (HCC)  Wt Readings from Last 3 Encounters:   02/06/25 (!) 208 kg (458 lb 1.9 oz)   01/17/25 (!) 205 kg (451 lb)   01/01/25 (!) 205 kg (451 lb 14.4 oz)     11/7/2024 TTE: LVEF 60% with normal systolic function  Outpatient regimen includes furosemide 60 mg twice daily   Nonadherent with outpatient Lasix due to hypotension  Nephrology input appreciated   Lasix discontinued on 2/6 when transition to full comfort measures  Transitioned to comfort measures only 2/6/2025  Type 2 diabetes mellitus without complication, without long-term current use of insulin ([facility])  Lab Results   Component Value Date    HGBA1C 7.2 (H) 11/06/2024       No results for input(s): \"POCGLU\" in the last 72 " hours.        Blood Sugar Average: Last 72 hrs:    Patient denies any history of diabetes  Transitioned to full comfort measures 2/6/2025, allow pleasure feed  No further labs, testing, or treatments  Goals of care, counseling/discussion  Goals of care discussion initiated  Patient has developed encephalopathy and bradycardia  Hospice evaluation appreciated   Case management making referrals for SNF on hospice.  Search has been expanded.  Patient has transitioned to comfort measures only 2/6/2025  Change in mental status  Patient having acute mental status changes 1/31/2025  UA positive-see plan for acute cystitis without hematuria  Transferred to [facility] 1/31/2025 for stat head CT-negative for acute intracranial abnormality  Per family, patient had confusion with Oxy in the past, this was discontinued  Continues with waxing and waning mentation  Transitioned to comfort measures only 2/6/2025  Acute cystitis without hematuria  UA positive  Patient with numerous antibiotic allergies  S/P treatment with cefepime and vancomycin  Infectious disease input appreciated   Transitioned to comfort measures only 2/6/2025   Metabolic encephalopathy  Due to acute cystitis  CT head negative  Transition to comfort measures only 2/6/2025  LARRY (acute kidney injury) (HCC)  Patient on Lasix at home and was requiring blood pressure support with midodrine so Lasix could be given   Appreciate nephrology who followed  Lasix discontinued on 2/6 when transition to comfort measures  Has transitioned to comfort measures only 2/6/2025  Hypotension  Previously on midodrine, discontinued on 2/6 when comfort measures initiated    Bradycardia  Noted to have bradycardia with heart rate in the 40s   Cardiology input appreciated   Please see also, goals of care discussion   Transition to comfort measures only 2/6/2025   Comfort measures only status  See also, plan for goals of care  Patient is transition to comfort measures only  Oral  medications discontinued, no further labs, testing, or treatments  Comfort medications ordered  Referrals have been made to SNF facilities with hospice services.  Will transfer to facility when arrangements finalized by case management    VTE Pharmacologic Prophylaxis: VTE Score: 8  no VTE prophylaxis on comfort measures    Mobility:   Basic Mobility Inpatient Raw Score: 6  JH-HLM Goal: 2: Bed activities/Dependent transfer  JH-HLM Achieved: 2: Bed activities/Dependent transfer  JH-HLM Goal achieved. Continue to encourage appropriate mobility.    Patient Centered Rounds: I performed bedside rounds with nursing staff today.   Discussions with Specialists or Other Care Team Provider:  nursing, case management  Education and Discussions with Family / Patient: Updated  (niece) via phone.    Current Length of Stay: 16 day(s)  Current Patient Status: Inpatient   Certification Statement: The patient will continue to require additional inpatient hospital stay due to coordination of care      Discharge Plan:  Awaiting SNF for hospice-referrals have been made by case management.  Will discharge when arrangements finalized.    Code Status: Level 4 - Comfort Care    Subjective   Sleepy today, denies discomfort when asked    Objective :  Temp:  [98.3 °F (36.8 °C)] 98.3 °F (36.8 °C)  BP: (120)/(67) 120/67  Resp:  [18-20] 20  SpO2:  [94 %] 94 %  O2 Device: Nasal cannula  Nasal Cannula O2 Flow Rate (L/min):  [3 L/min] 3 L/min    Body mass index is 81.15 kg/m².     Input and Output Summary (last 24 hours):     Intake/Output Summary (Last 24 hours) at 2/12/2025 1543  Last data filed at 2/12/2025 1200  Gross per 24 hour   Intake 400 ml   Output --   Net 400 ml       Physical Exam  Vitals reviewed.   Constitutional:       General: She is not in acute distress.     Appearance: She is well-developed. She is obese. She is ill-appearing.   HENT:      Head: Normocephalic and atraumatic.   Cardiovascular:      Rate and Rhythm:  Bradycardia present. Rhythm irregular.      Pulses: Normal pulses.      Heart sounds: Normal heart sounds. No murmur heard.  Pulmonary:      Effort: Pulmonary effort is normal. No respiratory distress.      Breath sounds: No wheezing or rales.      Comments: Nonlabored respirations at rest on O2 3 L nasal cannula, decreased breath sounds bilateral bases, no cough or shortness of breath  Abdominal:      General: There is no distension.      Palpations: Abdomen is soft.      Tenderness: There is no abdominal tenderness. There is no guarding.   Musculoskeletal:         General: Swelling present.      Right lower leg: Edema present.      Left lower leg: Edema present.   Skin:     General: Skin is warm and dry.      Capillary Refill: Capillary refill takes less than 2 seconds.   Neurological:      Mental Status: She is alert and oriented to person, place, and time. Mental status is at baseline.           Lines/Drains:                  Lab Results: I have reviewed the following results:               Results from last 7 days   Lab Units 02/06/25  0406   INR  2.80*                       Recent Cultures (last 7 days):           Imaging Results Review: I reviewed radiology reports from this admission including: CT abdomen pelvis, CT head.  Other Study Results Review: No additional pertinent studies reviewed.    Last 24 Hours Medication List:     Current Facility-Administered Medications:     acetaminophen (TYLENOL) tablet 650 mg, Q6H PRN    diphenhydrAMINE (BENADRYL) injection 50 mg, Q6H PRN    LORazepam (ATIVAN) tablet 1 mg, Q3H PRN    morphine injection 2 mg, Q3H PRN    Administrative Statements   Today, Patient Was Seen By: ORACIO Last  I have spent a total time of 38 minutes in caring for this patient on the day of the visit/encounter including Documenting in the medical record and Communicating with other healthcare professionals .    **Please Note: This note may have been constructed using a voice  recognition system.**

## 2025-02-12 NOTE — ASSESSMENT & PLAN NOTE
Patient on Lasix at home and was requiring blood pressure support with midodrine so Lasix could be given   Appreciate nephrology who followed  Lasix discontinued on 2/6 when transition to comfort measures  Has transitioned to comfort measures only 2/6/2025

## 2025-02-12 NOTE — WOUND OSTOMY CARE
Progress Note - Wound   Zee Kirk 74 y.o. female MRN: [id number]  Unit/Bed#: -01 Encounter: [id number]        Assessment:   Patient is now comfort measures only. Patient is in room and resting comfortably. Spoke with primary nurse, there are no new wounds noted and current treatment plan is going well for wound and skin treatment. Recommend continue with current treatment plan as needed for patient comfort. Wound care will sign off a this time, please message via [employer] secure chat with any questions/concerns.     Skin care plans:  1-Hydraguard/Silicone Cream to bilateral sacrum, buttock, and heels as needed for patient comfort  2-Elevate heels to offload pressure as needed for patient comfort.  3-Moisturize skin daily with skin nourishing cream as needed for patient comfort.  4-Turn/reposition as needed for patient comfort.   5-B/L Axilla, Breast skin folds, right abdominal pannus, B/L medial thigh skin folds, and posterior knee skin folds- Cleanse wounds with soap and water, pat dry. Apply miconazole cream/ointment and then cover with ABD pad. Change 1-2 times a day or as needed for soilage/displacement.   6-Right lateral thigh- Cleanse wounds with NSS, pat dry. Apply Silicone foam dressing over wound beds. Change every other day and as needed for soilage/displacement.   7-Bariatric low air loss mattress.    Wound Care will sign off  Contact through [employer] Secure Chat for any questions/concerns    Melissa SALINAS RN CWON  Wound and Ostomy care

## 2025-02-13 PROCEDURE — 99231 SBSQ HOSP IP/OBS SF/LOW 25: CPT

## 2025-02-13 NOTE — PROGRESS NOTES
"Progress Note - Hospitalist   Name: Zee Kirk 74 y.o. female I MRN: [id number]  Unit/Bed#: -[facility] I Date of Admission: 1/26/2025   Date of Service: 2/13/2025 I Hospital Day: 17    Assessment & Plan  Panniculitis  POA with redness and drainage of bilateral pan eyes similar to admissions  CT imaging with no obvious abscess  Patient was not candidate for panniculectomy in the past  S/p cefepime and vancomycin IV-now off of antibiotics  Infectious disease input appreciated  Transitioned to comfort measures only 2/6/2025  History of Clostridioides difficile infection  Was receiving oral vancomycin, discontinued 2/6 as patient transition to CMO  Hypothyroid  Levothyroxine discontinued 2/6 when patient transition to comfort measures  Class 3 severe obesity due to excess calories with serious comorbidity and body mass index (BMI) greater than or equal to 70 in adult ([facility])  BMI 81.15  Longstanding persistent atrial fibrillation (HCC)  Heart rate remains 40s to 50s  Was receiving Coumadin for anticoagulation-discontinued 2/6/2025 after discussion with the family  Cardiology input appreciated  Transitioned to comfort measures only as of 2/6/2025  No further labs, testing, or treatments  Chronic heart failure with preserved ejection fraction (HCC)  Wt Readings from Last 3 Encounters:   02/06/25 (!) 208 kg (458 lb 1.9 oz)   01/17/25 (!) 205 kg (451 lb)   01/01/25 (!) 205 kg (451 lb 14.4 oz)     11/7/2024 TTE: LVEF 60% with normal systolic function  Outpatient regimen includes furosemide 60 mg twice daily   Nonadherent with outpatient Lasix due to hypotension  Nephrology input appreciated   Lasix discontinued on 2/6 when transition to full comfort measures  Transitioned to comfort measures only 2/6/2025  Type 2 diabetes mellitus without complication, without long-term current use of insulin ([facility])  Lab Results   Component Value Date    HGBA1C 7.2 (H) 11/06/2024       No results for input(s): \"POCGLU\" in the last 72 " hours.        Blood Sugar Average: Last 72 hrs:    Patient denies any history of diabetes  Transitioned to full comfort measures 2/6/2025, allow pleasure feed  No further labs, testing, or treatments  Goals of care, counseling/discussion  Goals of care discussion initiated  Patient has developed encephalopathy and bradycardia  Hospice evaluation appreciated   Case management making referrals for SNF on hospice.  Search has been expanded.  Patient has transitioned to comfort measures only 2/6/2025  Change in mental status  Patient having acute mental status changes 1/31/2025  UA positive-see plan for acute cystitis without hematuria  Transferred to [facility] 1/31/2025 for stat head CT-negative for acute intracranial abnormality  Per family, patient had confusion with Oxy in the past, this was discontinued  Continues with waxing and waning mentation  Transitioned to comfort measures only 2/6/2025  Acute cystitis without hematuria  UA positive  Patient with numerous antibiotic allergies  S/P treatment with cefepime and vancomycin  Infectious disease input appreciated   Transitioned to comfort measures only 2/6/2025   Metabolic encephalopathy  Due to acute cystitis  CT head negative  Transition to comfort measures only 2/6/2025  LARRY (acute kidney injury) (HCC)  Patient on Lasix at home and was requiring blood pressure support with midodrine so Lasix could be given   Appreciate nephrology who followed  Lasix discontinued on 2/6 when transition to comfort measures  Has transitioned to comfort measures only 2/6/2025  Hypotension  Previously on midodrine, discontinued on 2/6 when comfort measures initiated    Bradycardia  Noted to have bradycardia with heart rate in the 40s   Cardiology input appreciated   Please see also, goals of care discussion   Transition to comfort measures only 2/6/2025   Comfort measures only status  See also, plan for goals of care  Patient is transition to comfort measures only  Oral  medications discontinued, no further labs, testing, or treatments  Comfort medications ordered  Referrals have been made to SNF facilities with hospice services.  Will transfer to facility when arrangements finalized by case management    VTE Pharmacologic Prophylaxis: VTE Score: 8  no VTE prophylaxis on comfort measures    Mobility:   Basic Mobility Inpatient Raw Score: 6  JH-HLM Goal: 2: Bed activities/Dependent transfer  JH-HLM Achieved: 2: Bed activities/Dependent transfer  JH-HLM Goal achieved. Continue to encourage appropriate mobility.    Patient Centered Rounds: I performed bedside rounds with nursing staff today.   Discussions with Specialists or Other Care Team Provider:  nursing, case management  Education and Discussions with Family / Patient: Patient declined call to .     Current Length of Stay: 17 day(s)  Current Patient Status: Inpatient   Certification Statement: The patient will continue to require additional inpatient hospital stay due to coordination of care      Discharge Plan:  Awaiting SNF for hospice-referrals have been made by case management.  Will discharge when arrangements finalized.    Code Status: Level 4 - Comfort Care    Subjective   Sleepy today, denies discomfort when asked    Objective :  Resp:  [17] 17  SpO2:  [98 %] 98 %  O2 Device: Nasal cannula  Nasal Cannula O2 Flow Rate (L/min):  [3 L/min] 3 L/min    Body mass index is 81.15 kg/m².     Input and Output Summary (last 24 hours):     Intake/Output Summary (Last 24 hours) at 2/13/2025 1644  Last data filed at 2/13/2025 0800  Gross per 24 hour   Intake 120 ml   Output 300 ml   Net -180 ml       Physical Exam  Vitals reviewed.   Constitutional:       General: She is not in acute distress.     Appearance: She is well-developed. She is obese. She is ill-appearing.   HENT:      Head: Normocephalic and atraumatic.   Cardiovascular:      Rate and Rhythm: Bradycardia present. Rhythm irregular.      Pulses: Normal pulses.       Heart sounds: Normal heart sounds. No murmur heard.  Pulmonary:      Effort: Pulmonary effort is normal. No respiratory distress.      Breath sounds: No wheezing or rales.      Comments: Nonlabored respirations at rest on O2 3 L nasal cannula, decreased breath sounds bilateral bases, no cough or shortness of breath  Abdominal:      General: There is no distension.      Palpations: Abdomen is soft.      Tenderness: There is no abdominal tenderness. There is no guarding.   Musculoskeletal:         General: Swelling present.      Right lower leg: Edema present.      Left lower leg: Edema present.   Skin:     General: Skin is warm and dry.      Capillary Refill: Capillary refill takes less than 2 seconds.   Neurological:      Mental Status: She is alert and oriented to person, place, and time. Mental status is at baseline.           Lines/Drains:                  Lab Results: I have reviewed the following results:                                       Recent Cultures (last 7 days):           Imaging Results Review: I reviewed radiology reports from this admission including: CT abdomen pelvis, CT head.  Other Study Results Review: No additional pertinent studies reviewed.    Last 24 Hours Medication List:     Current Facility-Administered Medications:     acetaminophen (TYLENOL) tablet 650 mg, Q6H PRN    diphenhydrAMINE (BENADRYL) injection 50 mg, Q6H PRN    LORazepam (ATIVAN) tablet 1 mg, Q3H PRN    morphine injection 2 mg, Q3H PRN    Administrative Statements   Today, Patient Was Seen By: ORACIO Last  I have spent a total time of 36 minutes in caring for this patient on the day of the visit/encounter including Documenting in the medical record and Communicating with other healthcare professionals .    **Please Note: This note may have been constructed using a voice recognition system.**

## 2025-02-13 NOTE — ASSESSMENT & PLAN NOTE
Patient having acute mental status changes 1/31/2025  UA positive-see plan for acute cystitis without hematuria  Transferred to [facility] 1/31/2025 for stat head CT-negative for acute intracranial abnormality  Per family, patient had confusion with Oxy in the past, this was discontinued  Continues with waxing and waning mentation  Transitioned to comfort measures only 2/6/2025

## 2025-02-13 NOTE — CASE MANAGEMENT
Case Management Discharge Planning Note    Patient name Zee Kirk  Location /-01 MRN [id number]  : 1950 Date 2025       Current Admission Date: 2025  Current Admission Diagnosis:Comfort measures only status   Patient Active Problem List    Diagnosis Date Noted Date Diagnosed    Comfort measures only status 2025     Bradycardia 2025     Stage 3a chronic kidney disease ([facility]) 2025     Metabolic encephalopathy 2025     Acute cystitis without hematuria 2025     Change in mental status 2025     Goals of care, counseling/discussion 2025     Bilateral lower extremity edema 2024     Hypotension 2024     Weakness 2024     Paroxysmal A-fib ([facility]) 2024     History of kidney stones 2024     Skin abnormalities 2024     Cataract, nuclear sclerotic senile, right 2024     Chronic heart failure with preserved ejection fraction ([facility]) 2024     Hypoxic episode 2024     Type 2 diabetes mellitus without complication, without long-term current use of insulin ([facility]) 2024     Long term (current) use of anticoagulants 2024     Subtherapeutic international normalized ratio (INR) 2024     Multiple open wounds 2024     Left leg pain 2023     Shingles 2023     Cellulitis- Ruled Out 2023     Longstanding persistent atrial fibrillation ([facility]) 2022     History of Clostridioides difficile infection 2022     Hypothyroid 10/03/2020     Mild episode of recurrent major depressive disorder ([facility]) 10/03/2020     Idiopathic chronic 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 10/03/2020     Class 3 severe obesity due to excess calories with serious comorbidity and body mass index (BMI) greater than or equal to 70 in adult ([facility]) 10/03/2020     Panniculitis 10/03/2020     Gastroesophageal reflux disease without esophagitis 10/03/2020     Hx MRSA infection 2020     Impaired  mobility 02/03/2019     Osteoarthritis of glenohumeral joint 02/03/2019     Left ovarian cyst 09/20/2017     Depression with anxiety 07/15/2017     Anemia 12/28/2016     LARRY (acute kidney injury) (HCC) 12/27/2016     Adjustment disorder 09/05/2013     Irritable bowel syndrome 12/04/2012     Left atrial enlargement 12/04/2012     Left ventricular hypertrophy 12/04/2012     Carpal tunnel syndrome 05/30/2012     Gout 05/30/2012     Hyperlipidemia 05/30/2012     Symptomatic menopausal or female climacteric states 05/30/2012       LOS (days): 16  Geometric Mean LOS (GMLOS) (days): 4.4  Days to GMLOS:-11.7     OBJECTIVE:  Risk of Unplanned Readmission Score: 29.54         Current admission status: Inpatient   Preferred Pharmacy:   [facility]  [place] [address]  Phone: [phone] Fax: [phone]    Primary Care Provider: Franklyn Caruso MD    Primary Insurance: MEDICARE  Secondary Insurance: [facility]    DISCHARGE DETAILS:    Discharge planning discussed with:: James was called wi geoffrey  Freedom of Choice: Yes  Comments - Freedom of Choice: pt on comfort care- cm expanded the search and additonal referrals were sent  CM contacted family/caregiver?: Yes             Contacts  Patient Contacts: Heatehr  Relationship to Patient:: Family  Phone Number: [phone]  Reason/Outcome: Discharge Planning    Requested Home Health Care         Is the patient interested in HHC at discharge?: No    DME Referral Provided  Referral made for DME?: No    Other Referral/Resources/Interventions Provided:  Interventions: Hospice, SNF         Treatment Team Recommendation: SNF, Hospice (snf on hospice care- BLS)

## 2025-02-13 NOTE — PLAN OF CARE
Problem: Prexisting or High Potential for Compromised Skin Integrity  Goal: Skin integrity is maintained or improved  Description: INTERVENTIONS:  - Identify patients at risk for skin breakdown  - Assess and monitor skin integrity  - Assess and monitor nutrition and hydration status  - Monitor labs   - Assess for incontinence   - Turn and reposition patient  - Assist with mobility/ambulation  - Relieve pressure over bony prominences  - Avoid friction and shearing  - Provide appropriate hygiene as needed including keeping skin clean and dry  - Evaluate need for skin moisturizer/barrier cream  - Collaborate with interdisciplinary team   - Patient/family teaching  - Consider wound care consult   Outcome: Progressing     Problem: PAIN - ADULT  Goal: Verbalizes/displays adequate comfort level or baseline comfort level  Description: Interventions:  - Encourage patient to monitor pain and request assistance  - Assess pain using appropriate pain scale  - Administer analgesics based on type and severity of pain and evaluate response  - Implement non-pharmacological measures as appropriate and evaluate response  - Consider cultural and social influences on pain and pain management  - Notify physician/advanced practitioner if interventions unsuccessful or patient reports new pain  Outcome: Progressing     Problem: INFECTION - ADULT  Goal: Absence or prevention of progression during hospitalization  Description: INTERVENTIONS:  - Assess and monitor for signs and symptoms of infection  - Monitor lab/diagnostic results  - Monitor all insertion sites, i.e. indwelling lines, tubes, and drains  - Monitor endotracheal if appropriate and nasal secretions for changes in amount and color  - [place] appropriate cooling/warming therapies per order  - Administer medications as ordered  - Instruct and encourage patient and family to use good hand hygiene technique  - Identify and instruct in appropriate isolation precautions for  identified infection/condition  Outcome: Progressing  Goal: Absence of fever/infection during neutropenic period  Description: INTERVENTIONS:  - Monitor WBC    Outcome: Progressing     Problem: SAFETY ADULT  Goal: Patient will remain free of falls  Description: INTERVENTIONS:  - Educate patient/family on patient safety including physical limitations  - Instruct patient to call for assistance with activity   - Consult OT/PT to assist with strengthening/mobility   - Keep Call bell within reach  - Keep bed low and locked with side rails adjusted as appropriate  - Keep care items and personal belongings within reach  - Initiate and maintain comfort rounds  - Make Fall Risk Sign visible to staff  - Offer Toileting every 2 Hours, in advance of need  - Initiate/Maintain bed alarm  - Obtain necessary fall risk management equipment: yellow socks  - Apply yellow socks and bracelet for high fall risk patients  - Consider moving patient to room near nurses station  Outcome: Progressing  Goal: Maintain or return to baseline ADL function  Description: INTERVENTIONS:  -  Assess patient's ability to carry out ADLs; assess patient's baseline for ADL function and identify physical deficits which impact ability to perform ADLs (bathing, care of mouth/teeth, toileting, grooming, dressing, etc.)  - Assess/evaluate cause of self-care deficits   - Assess range of motion  - Assess patient's mobility; develop plan if impaired  - Assess patient's need for assistive devices and provide as appropriate  - Encourage maximum independence but intervene and supervise when necessary  - Involve family in performance of ADLs  - Assess for home care needs following discharge   - Consider OT consult to assist with ADL evaluation and planning for discharge  - Provide patient education as appropriate  Outcome: Progressing  Goal: Maintains/Returns to pre admission functional level  Description: INTERVENTIONS:  - Perform AM-PAC 6 Click Basic Mobility/ Daily  Activity assessment daily.  - Set and communicate daily mobility goal to care team and patient/family/caregiver.   - Collaborate with rehabilitation services on mobility goals if consulted  - Perform Range of Motion 3 times a day.  - Reposition patient every 2 hours.  - Dangle patient 3 times a day  - Stand patient 3 times a day  - Ambulate patient 3 times a day  - Out of bed to chair 3 times a day   - Out of bed for meals 3 times a day  - Out of bed for toileting  - Record patient progress and toleration of activity level   Outcome: Progressing     Problem: DISCHARGE PLANNING  Goal: Discharge to home or other facility with appropriate resources  Description: INTERVENTIONS:  - Identify barriers to discharge w/patient and caregiver  - Arrange for needed discharge resources and transportation as appropriate  - Identify discharge learning needs (meds, wound care, etc.)  - Arrange for interpretive services to assist at discharge as needed  - Refer to Case Management Department for coordinating discharge planning if the patient needs post-hospital services based on physician/advanced practitioner order or complex needs related to functional status, cognitive ability, or social support system  Outcome: Progressing     Problem: Knowledge Deficit  Goal: Patient/family/caregiver demonstrates understanding of disease process, treatment plan, medications, and discharge instructions  Description: Complete learning assessment and assess knowledge base.  Interventions:  - Provide teaching at level of understanding  - Provide teaching via preferred learning methods  Outcome: Progressing     Problem: GASTROINTESTINAL - ADULT  Goal: Minimal or absence of nausea and/or vomiting  Description: INTERVENTIONS:  - Administer IV fluids if ordered to ensure adequate hydration  - Maintain NPO status until nausea and vomiting are resolved  - Nasogastric tube if ordered  - Administer ordered antiemetic medications as needed  - Provide  nonpharmacologic comfort measures as appropriate  - Advance diet as tolerated, if ordered  - Consider nutrition services referral to assist patient with adequate nutrition and appropriate food choices  Outcome: Progressing  Goal: Maintains or returns to baseline bowel function  Description: INTERVENTIONS:  - Assess bowel function  - Encourage oral fluids to ensure adequate hydration  - Administer IV fluids if ordered to ensure adequate hydration  - Administer ordered medications as needed  - Encourage mobilization and activity  - Consider nutritional services referral to assist patient with adequate nutrition and appropriate food choices  Outcome: Progressing  Goal: Maintains adequate nutritional intake  Description: INTERVENTIONS:  - Monitor percentage of each meal consumed  - Identify factors contributing to decreased intake, treat as appropriate  - Assist with meals as needed  - Monitor I&O, weight, and lab values if indicated  - Obtain nutrition services referral as needed  Outcome: Progressing  Goal: Establish and maintain optimal ostomy function  Description: INTERVENTIONS:  - Assess bowel function  - Encourage oral fluids to ensure adequate hydration  - Administer IV fluids if ordered to ensure adequate hydration   - Administer ordered medications as needed  - Encourage mobilization and activity  - Nutrition services referral to assist patient with appropriate food choices  - Assess stoma site  - Consider wound care consult   Outcome: Progressing  Goal: Oral mucous membranes remain intact  Description: INTERVENTIONS  - Assess oral mucosa and hygiene practices  - Implement preventative oral hygiene regimen  - Implement oral medicated treatments as ordered  - Initiate Nutrition services referral as needed  Outcome: Progressing     Problem: Nutrition/Hydration-ADULT  Goal: Nutrient/Hydration intake appropriate for improving, restoring or maintaining nutritional needs  Description: Monitor and assess patient's  nutrition/hydration status for malnutrition. Collaborate with interdisciplinary team and initiate plan and interventions as ordered.  Monitor patient's weight and dietary intake as ordered or per policy. Utilize nutrition screening tool and intervene as necessary. Determine patient's food preferences and provide high-protein, high-caloric foods as appropriate.     INTERVENTIONS:  - Monitor oral intake, urinary output, labs, and treatment plans  - Assess nutrition and hydration status and recommend course of action  - Evaluate amount of meals eaten  - Assist patient with eating if necessary   - Allow adequate time for meals  - Recommend/ encourage appropriate diets, oral nutritional supplements, and vitamin/mineral supplements  - Order, calculate, and assess calorie counts as needed  - Recommend, monitor, and adjust tube feedings and TPN/PPN based on assessed needs  - Assess need for intravenous fluids  - Provide specific nutrition/hydration education as appropriate  - Include patient/family/caregiver in decisions related to nutrition  Outcome: Progressing     Problem: Decreased Cardiac Output  Goal: Cardiac output adequate for individual needs  Description: INTERVENTIONS: Monitor for signs and symptoms of decreased cardiac output   - Monitor for dyspnea with exertion and at rest  - Monitor for orthopnea  - Monitor for signs of tachycardia. Place patient on telemetry monitoring.  - Assess patient for jugular vein distention  - Assess patient for lower extremity edema and poor peripheral perfusion   - Auscultate lung sound for Fine bibasilar crackles   - Monitor for cardiac arrythmias   - Administer beta blockers, antiarrhythmic, and blood pressure medications as ordered    Outcome: Progressing     Problem: Impaired Gas Exchange  Goal: Optimize oxygenation and ensure adequate ventilation  Description: INTERVENTIONS: Monitor for signs and symptoms of respiratory distress                - Elevate HOB or use high fowlers  to promote lung expansion                - Administer oxygen as ordered to maintain adequate oxygenation                - Encourage use of IS to promote lung expansion and prevent PN                - Monitor ABGs to assess oxygenation status                - Monitor blood oxygen level to maintain adequate oxygenation                - Encourage cough and deep breathing exercises to promote lung expansion                - Monitor patient's mental status for increased confusion    Outcome: Progressing     Problem: Excess Fluid Volume  Goal: Patient is able to achieve and maintain homeostasis  Description: INTERVENTIONS: Monitor for sign and symptoms of fluid overload  - Evaluate LE edema every shift  - Elevate LE to prevent dependent edema  - Apply ALONDRA stockings as ordered   - Monitor ankle circumference daily  - Assess for jugular vein distention  - Evaluate provider orders for the CHF diuretic algorithm. Administer diuretics as ordered  - Weigh the patient daily at 0600 and report a weight gain of five pounds or more   - Strict intake and output  - Monitor fluid intake and adhere to fluid restrictions  - Assess lung sounds every shift and as needed  - Monitor vital signs and lab values (CBC, chem, BUN, BNP)  - Measure and document urine output    Outcome: Progressing     Problem: Activity Intolerance  Goal: Patient is able to perform activities within their limitations  Description: INTERVENTIONS:                       -   Alternate periods of activity with periods of rest                 -   Patients is able to maintain normal vitals heart rhythm during activity                 -   Gradually increase activity and exercise as patient can tolerate                 -   Monitor blood pressure and heart before and after exercise                  -   Monitor blood oxygen saturation during activity and apply oxygen as needed    Outcome: Progressing     Problem: Knowledge Deficit  Goal: Patient is able to verbalize  understanding of Heart Failure after education  Description: INTERVENTIONS:  - Educate the patient and family on signs and symptoms of HF  - Provide the patient with HF education and HF zone tool  - Educate on the importance of daily weight in the AM and reporting a weight gain               of 3 or more pounds to their primary care physician  - Monitor for SOB  - Maintain and sodium and fluid restriction  - Educate the patient on the importance of medications such as: diuretics, betablockers,               antiarrhythmics and their purpose, dose, route, side effects and labs               if they are needed    Outcome: Progressing     Problem: Potential for Falls  Goal: Patient will remain free of falls  Description: INTERVENTIONS:  - Educate patient/family on patient safety including physical limitations  - Instruct patient to call for assistance with activity   - Consult OT/PT to assist with strengthening/mobility   - Keep Call bell within reach  - Keep bed low and locked with side rails adjusted as appropriate  - Keep care items and personal belongings within reach  - Initiate and maintain comfort rounds  - Make Fall Risk Sign visible to staff  - Offer Toileting every 2 Hours, in advance of need  - Initiate/Maintain bed alarm  - Obtain necessary fall risk management equipment: yellow socks  - Apply yellow socks and bracelet for high fall risk patients  - Consider moving patient to room near nurses station  Outcome: Progressing

## 2025-02-14 PROCEDURE — 99231 SBSQ HOSP IP/OBS SF/LOW 25: CPT | Performed by: NURSE PRACTITIONER

## 2025-02-14 RX ADMIN — LORAZEPAM 1 MG: 1 TABLET ORAL at 05:09

## 2025-02-14 RX ADMIN — LORAZEPAM 1 MG: 1 TABLET ORAL at 23:46

## 2025-02-14 NOTE — ASSESSMENT & PLAN NOTE
Patient having acute mental status changes 1/31/2025  UA positive-see plan for acute cystitis without hematuria  Transferred to [facility] 1/31/2025 for stat head CT-negative for acute intracranial abnormality  Per family, patient had confusion with Oxy in the past, this was discontinued  Continues with waxing and waning mentation  Transitioned to comfort measures only 2/6/2025

## 2025-02-14 NOTE — ASSESSMENT & PLAN NOTE
Wt Readings from Last 3 Encounters:   02/06/25 (!) 208 kg (458 lb 1.9 oz)   01/17/25 (!) 205 kg (451 lb)   01/01/25 (!) 205 kg (451 lb 14.4 oz)     11/7/2024 TTE: LVEF 60% with normal systolic function  Outpatient regimen included furosemide 60 mg twice daily   Nonadherent with outpatient Lasix due to hypotension  Nephrology input appreciated   Lasix discontinued   Transitioned to comfort measures only 2/6/2025

## 2025-02-14 NOTE — PLAN OF CARE
Problem: Prexisting or High Potential for Compromised Skin Integrity  Goal: Skin integrity is maintained or improved  Description: INTERVENTIONS:  - Identify patients at risk for skin breakdown  - Assess and monitor skin integrity  - Assess and monitor nutrition and hydration status  - Monitor labs   - Assess for incontinence   - Turn and reposition patient  - Assist with mobility/ambulation  - Relieve pressure over bony prominences  - Avoid friction and shearing  - Provide appropriate hygiene as needed including keeping skin clean and dry  - Evaluate need for skin moisturizer/barrier cream  - Collaborate with interdisciplinary team   - Patient/family teaching  - Consider wound care consult   Outcome: Progressing     Problem: PAIN - ADULT  Goal: Verbalizes/displays adequate comfort level or baseline comfort level  Description: Interventions:  - Encourage patient to monitor pain and request assistance  - Assess pain using appropriate pain scale  - Administer analgesics based on type and severity of pain and evaluate response  - Implement non-pharmacological measures as appropriate and evaluate response  - Consider cultural and social influences on pain and pain management  - Notify physician/advanced practitioner if interventions unsuccessful or patient reports new pain  Outcome: Progressing     Problem: INFECTION - ADULT  Goal: Absence or prevention of progression during hospitalization  Description: INTERVENTIONS:  - Assess and monitor for signs and symptoms of infection  - Monitor lab/diagnostic results  - Monitor all insertion sites, i.e. indwelling lines, tubes, and drains  - Monitor endotracheal if appropriate and nasal secretions for changes in amount and color  - [place] appropriate cooling/warming therapies per order  - Administer medications as ordered  - Instruct and encourage patient and family to use good hand hygiene technique  - Identify and instruct in appropriate isolation precautions for  identified infection/condition  Outcome: Progressing  Goal: Absence of fever/infection during neutropenic period  Description: INTERVENTIONS:  - Monitor WBC    Outcome: Progressing     Problem: SAFETY ADULT  Goal: Patient will remain free of falls  Description: INTERVENTIONS:  - Educate patient/family on patient safety including physical limitations  - Instruct patient to call for assistance with activity   - Consult OT/PT to assist with strengthening/mobility   - Keep Call bell within reach  - Keep bed low and locked with side rails adjusted as appropriate  - Keep care items and personal belongings within reach  - Initiate and maintain comfort rounds  - Make Fall Risk Sign visible to staff  - Offer Toileting every 2 Hours, in advance of need  - Initiate/Maintain bed alarm  - Obtain necessary fall risk management equipment: yellow socks  - Apply yellow socks and bracelet for high fall risk patients  - Consider moving patient to room near nurses station  Outcome: Progressing  Goal: Maintain or return to baseline ADL function  Description: INTERVENTIONS:  -  Assess patient's ability to carry out ADLs; assess patient's baseline for ADL function and identify physical deficits which impact ability to perform ADLs (bathing, care of mouth/teeth, toileting, grooming, dressing, etc.)  - Assess/evaluate cause of self-care deficits   - Assess range of motion  - Assess patient's mobility; develop plan if impaired  - Assess patient's need for assistive devices and provide as appropriate  - Encourage maximum independence but intervene and supervise when necessary  - Involve family in performance of ADLs  - Assess for home care needs following discharge   - Consider OT consult to assist with ADL evaluation and planning for discharge  - Provide patient education as appropriate  Outcome: Progressing  Goal: Maintains/Returns to pre admission functional level  Description: INTERVENTIONS:  - Perform AM-PAC 6 Click Basic Mobility/ Daily  Activity assessment daily.  - Set and communicate daily mobility goal to care team and patient/family/caregiver.   - Collaborate with rehabilitation services on mobility goals if consulted  - Perform Range of Motion 3 times a day.  - Reposition patient every 2 hours.  - Dangle patient 3 times a day  - Stand patient 3 times a day  - Ambulate patient 3 times a day  - Out of bed to chair 3 times a day   - Out of bed for meals 3 times a day  - Out of bed for toileting  - Record patient progress and toleration of activity level   Outcome: Progressing     Problem: DISCHARGE PLANNING  Goal: Discharge to home or other facility with appropriate resources  Description: INTERVENTIONS:  - Identify barriers to discharge w/patient and caregiver  - Arrange for needed discharge resources and transportation as appropriate  - Identify discharge learning needs (meds, wound care, etc.)  - Arrange for interpretive services to assist at discharge as needed  - Refer to Case Management Department for coordinating discharge planning if the patient needs post-hospital services based on physician/advanced practitioner order or complex needs related to functional status, cognitive ability, or social support system  Outcome: Progressing     Problem: Knowledge Deficit  Goal: Patient/family/caregiver demonstrates understanding of disease process, treatment plan, medications, and discharge instructions  Description: Complete learning assessment and assess knowledge base.  Interventions:  - Provide teaching at level of understanding  - Provide teaching via preferred learning methods  Outcome: Progressing     Problem: GASTROINTESTINAL - ADULT  Goal: Minimal or absence of nausea and/or vomiting  Description: INTERVENTIONS:  - Administer IV fluids if ordered to ensure adequate hydration  - Maintain NPO status until nausea and vomiting are resolved  - Nasogastric tube if ordered  - Administer ordered antiemetic medications as needed  - Provide  nonpharmacologic comfort measures as appropriate  - Advance diet as tolerated, if ordered  - Consider nutrition services referral to assist patient with adequate nutrition and appropriate food choices  Outcome: Progressing  Goal: Maintains or returns to baseline bowel function  Description: INTERVENTIONS:  - Assess bowel function  - Encourage oral fluids to ensure adequate hydration  - Administer IV fluids if ordered to ensure adequate hydration  - Administer ordered medications as needed  - Encourage mobilization and activity  - Consider nutritional services referral to assist patient with adequate nutrition and appropriate food choices  Outcome: Progressing  Goal: Maintains adequate nutritional intake  Description: INTERVENTIONS:  - Monitor percentage of each meal consumed  - Identify factors contributing to decreased intake, treat as appropriate  - Assist with meals as needed  - Monitor I&O, weight, and lab values if indicated  - Obtain nutrition services referral as needed  Outcome: Progressing  Goal: Establish and maintain optimal ostomy function  Description: INTERVENTIONS:  - Assess bowel function  - Encourage oral fluids to ensure adequate hydration  - Administer IV fluids if ordered to ensure adequate hydration   - Administer ordered medications as needed  - Encourage mobilization and activity  - Nutrition services referral to assist patient with appropriate food choices  - Assess stoma site  - Consider wound care consult   Outcome: Progressing  Goal: Oral mucous membranes remain intact  Description: INTERVENTIONS  - Assess oral mucosa and hygiene practices  - Implement preventative oral hygiene regimen  - Implement oral medicated treatments as ordered  - Initiate Nutrition services referral as needed  Outcome: Progressing     Problem: Nutrition/Hydration-ADULT  Goal: Nutrient/Hydration intake appropriate for improving, restoring or maintaining nutritional needs  Description: Monitor and assess patient's  nutrition/hydration status for malnutrition. Collaborate with interdisciplinary team and initiate plan and interventions as ordered.  Monitor patient's weight and dietary intake as ordered or per policy. Utilize nutrition screening tool and intervene as necessary. Determine patient's food preferences and provide high-protein, high-caloric foods as appropriate.     INTERVENTIONS:  - Monitor oral intake, urinary output, labs, and treatment plans  - Assess nutrition and hydration status and recommend course of action  - Evaluate amount of meals eaten  - Assist patient with eating if necessary   - Allow adequate time for meals  - Recommend/ encourage appropriate diets, oral nutritional supplements, and vitamin/mineral supplements  - Order, calculate, and assess calorie counts as needed  - Recommend, monitor, and adjust tube feedings and TPN/PPN based on assessed needs  - Assess need for intravenous fluids  - Provide specific nutrition/hydration education as appropriate  - Include patient/family/caregiver in decisions related to nutrition  Outcome: Progressing     Problem: Decreased Cardiac Output  Goal: Cardiac output adequate for individual needs  Description: INTERVENTIONS: Monitor for signs and symptoms of decreased cardiac output   - Monitor for dyspnea with exertion and at rest  - Monitor for orthopnea  - Monitor for signs of tachycardia. Place patient on telemetry monitoring.  - Assess patient for jugular vein distention  - Assess patient for lower extremity edema and poor peripheral perfusion   - Auscultate lung sound for Fine bibasilar crackles   - Monitor for cardiac arrythmias   - Administer beta blockers, antiarrhythmic, and blood pressure medications as ordered    Outcome: Progressing     Problem: Impaired Gas Exchange  Goal: Optimize oxygenation and ensure adequate ventilation  Description: INTERVENTIONS: Monitor for signs and symptoms of respiratory distress                - Elevate HOB or use high fowlers  to promote lung expansion                - Administer oxygen as ordered to maintain adequate oxygenation                - Encourage use of IS to promote lung expansion and prevent PN                - Monitor ABGs to assess oxygenation status                - Monitor blood oxygen level to maintain adequate oxygenation                - Encourage cough and deep breathing exercises to promote lung expansion                - Monitor patient's mental status for increased confusion    Outcome: Progressing     Problem: Excess Fluid Volume  Goal: Patient is able to achieve and maintain homeostasis  Description: INTERVENTIONS: Monitor for sign and symptoms of fluid overload  - Evaluate LE edema every shift  - Elevate LE to prevent dependent edema  - Apply ALONDRA stockings as ordered   - Monitor ankle circumference daily  - Assess for jugular vein distention  - Evaluate provider orders for the CHF diuretic algorithm. Administer diuretics as ordered  - Weigh the patient daily at 0600 and report a weight gain of five pounds or more   - Strict intake and output  - Monitor fluid intake and adhere to fluid restrictions  - Assess lung sounds every shift and as needed  - Monitor vital signs and lab values (CBC, chem, BUN, BNP)  - Measure and document urine output    Outcome: Progressing     Problem: Activity Intolerance  Goal: Patient is able to perform activities within their limitations  Description: INTERVENTIONS:                       -   Alternate periods of activity with periods of rest                 -   Patients is able to maintain normal vitals heart rhythm during activity                 -   Gradually increase activity and exercise as patient can tolerate                 -   Monitor blood pressure and heart before and after exercise                  -   Monitor blood oxygen saturation during activity and apply oxygen as needed    Outcome: Progressing     Problem: Knowledge Deficit  Goal: Patient is able to verbalize  understanding of Heart Failure after education  Description: INTERVENTIONS:  - Educate the patient and family on signs and symptoms of HF  - Provide the patient with HF education and HF zone tool  - Educate on the importance of daily weight in the AM and reporting a weight gain               of 3 or more pounds to their primary care physician  - Monitor for SOB  - Maintain and sodium and fluid restriction  - Educate the patient on the importance of medications such as: diuretics, betablockers,               antiarrhythmics and their purpose, dose, route, side effects and labs               if they are needed    Outcome: Progressing     Problem: Potential for Falls  Goal: Patient will remain free of falls  Description: INTERVENTIONS:  - Educate patient/family on patient safety including physical limitations  - Instruct patient to call for assistance with activity   - Consult OT/PT to assist with strengthening/mobility   - Keep Call bell within reach  - Keep bed low and locked with side rails adjusted as appropriate  - Keep care items and personal belongings within reach  - Initiate and maintain comfort rounds  - Make Fall Risk Sign visible to staff  - Offer Toileting every 2 Hours, in advance of need  - Initiate/Maintain bed alarm  - Obtain necessary fall risk management equipment: yellow socks  - Apply yellow socks and bracelet for high fall risk patients  - Consider moving patient to room near nurses station  Outcome: Progressing

## 2025-02-14 NOTE — PLAN OF CARE
Problem: Prexisting or High Potential for Compromised Skin Integrity  Goal: Skin integrity is maintained or improved  Description: INTERVENTIONS:  - Identify patients at risk for skin breakdown  - Assess and monitor skin integrity  - Assess and monitor nutrition and hydration status  - Monitor labs   - Assess for incontinence   - Turn and reposition patient  - Assist with mobility/ambulation  - Relieve pressure over bony prominences  - Avoid friction and shearing  - Provide appropriate hygiene as needed including keeping skin clean and dry  - Evaluate need for skin moisturizer/barrier cream  - Collaborate with interdisciplinary team   - Patient/family teaching  - Consider wound care consult   Outcome: Progressing     Problem: PAIN - ADULT  Goal: Verbalizes/displays adequate comfort level or baseline comfort level  Description: Interventions:  - Encourage patient to monitor pain and request assistance  - Assess pain using appropriate pain scale  - Administer analgesics based on type and severity of pain and evaluate response  - Implement non-pharmacological measures as appropriate and evaluate response  - Consider cultural and social influences on pain and pain management  - Notify physician/advanced practitioner if interventions unsuccessful or patient reports new pain  Outcome: Progressing     Problem: INFECTION - ADULT  Goal: Absence or prevention of progression during hospitalization  Description: INTERVENTIONS:  - Assess and monitor for signs and symptoms of infection  - Monitor lab/diagnostic results  - Monitor all insertion sites, i.e. indwelling lines, tubes, and drains  - Monitor endotracheal if appropriate and nasal secretions for changes in amount and color  - [place] appropriate cooling/warming therapies per order  - Administer medications as ordered  - Instruct and encourage patient and family to use good hand hygiene technique  - Identify and instruct in appropriate isolation precautions for  identified infection/condition  Outcome: Progressing  Goal: Absence of fever/infection during neutropenic period  Description: INTERVENTIONS:  - Monitor WBC    Outcome: Progressing     Problem: SAFETY ADULT  Goal: Patient will remain free of falls  Description: INTERVENTIONS:  - Educate patient/family on patient safety including physical limitations  - Instruct patient to call for assistance with activity   - Consult OT/PT to assist with strengthening/mobility   - Keep Call bell within reach  - Keep bed low and locked with side rails adjusted as appropriate  - Keep care items and personal belongings within reach  - Initiate and maintain comfort rounds  - Make Fall Risk Sign visible to staff  - Offer Toileting every 2 Hours, in advance of need  - Initiate/Maintain bed alarm  - Obtain necessary fall risk management equipment: yellow socks  - Apply yellow socks and bracelet for high fall risk patients  - Consider moving patient to room near nurses station  Outcome: Progressing  Goal: Maintain or return to baseline ADL function  Description: INTERVENTIONS:  -  Assess patient's ability to carry out ADLs; assess patient's baseline for ADL function and identify physical deficits which impact ability to perform ADLs (bathing, care of mouth/teeth, toileting, grooming, dressing, etc.)  - Assess/evaluate cause of self-care deficits   - Assess range of motion  - Assess patient's mobility; develop plan if impaired  - Assess patient's need for assistive devices and provide as appropriate  - Encourage maximum independence but intervene and supervise when necessary  - Involve family in performance of ADLs  - Assess for home care needs following discharge   - Consider OT consult to assist with ADL evaluation and planning for discharge  - Provide patient education as appropriate  Outcome: Progressing  Goal: Maintains/Returns to pre admission functional level  Description: INTERVENTIONS:  - Perform AM-PAC 6 Click Basic Mobility/ Daily  Activity assessment daily.  - Set and communicate daily mobility goal to care team and patient/family/caregiver.   - Collaborate with rehabilitation services on mobility goals if consulted  - Perform Range of Motion 3 times a day.  - Reposition patient every 2 hours.  - Dangle patient 3 times a day  - Stand patient 3 times a day  - Ambulate patient 3 times a day  - Out of bed to chair 3 times a day   - Out of bed for meals 3 times a day  - Out of bed for toileting  - Record patient progress and toleration of activity level   Outcome: Progressing     Problem: DISCHARGE PLANNING  Goal: Discharge to home or other facility with appropriate resources  Description: INTERVENTIONS:  - Identify barriers to discharge w/patient and caregiver  - Arrange for needed discharge resources and transportation as appropriate  - Identify discharge learning needs (meds, wound care, etc.)  - Arrange for interpretive services to assist at discharge as needed  - Refer to Case Management Department for coordinating discharge planning if the patient needs post-hospital services based on physician/advanced practitioner order or complex needs related to functional status, cognitive ability, or social support system  Outcome: Progressing     Problem: Knowledge Deficit  Goal: Patient/family/caregiver demonstrates understanding of disease process, treatment plan, medications, and discharge instructions  Description: Complete learning assessment and assess knowledge base.  Interventions:  - Provide teaching at level of understanding  - Provide teaching via preferred learning methods  Outcome: Progressing     Problem: GASTROINTESTINAL - ADULT  Goal: Minimal or absence of nausea and/or vomiting  Description: INTERVENTIONS:  - Administer IV fluids if ordered to ensure adequate hydration  - Maintain NPO status until nausea and vomiting are resolved  - Nasogastric tube if ordered  - Administer ordered antiemetic medications as needed  - Provide  nonpharmacologic comfort measures as appropriate  - Advance diet as tolerated, if ordered  - Consider nutrition services referral to assist patient with adequate nutrition and appropriate food choices  Outcome: Progressing  Goal: Maintains or returns to baseline bowel function  Description: INTERVENTIONS:  - Assess bowel function  - Encourage oral fluids to ensure adequate hydration  - Administer IV fluids if ordered to ensure adequate hydration  - Administer ordered medications as needed  - Encourage mobilization and activity  - Consider nutritional services referral to assist patient with adequate nutrition and appropriate food choices  Outcome: Progressing  Goal: Maintains adequate nutritional intake  Description: INTERVENTIONS:  - Monitor percentage of each meal consumed  - Identify factors contributing to decreased intake, treat as appropriate  - Assist with meals as needed  - Monitor I&O, weight, and lab values if indicated  - Obtain nutrition services referral as needed  Outcome: Progressing  Goal: Establish and maintain optimal ostomy function  Description: INTERVENTIONS:  - Assess bowel function  - Encourage oral fluids to ensure adequate hydration  - Administer IV fluids if ordered to ensure adequate hydration   - Administer ordered medications as needed  - Encourage mobilization and activity  - Nutrition services referral to assist patient with appropriate food choices  - Assess stoma site  - Consider wound care consult   Outcome: Progressing  Goal: Oral mucous membranes remain intact  Description: INTERVENTIONS  - Assess oral mucosa and hygiene practices  - Implement preventative oral hygiene regimen  - Implement oral medicated treatments as ordered  - Initiate Nutrition services referral as needed  Outcome: Progressing     Problem: Nutrition/Hydration-ADULT  Goal: Nutrient/Hydration intake appropriate for improving, restoring or maintaining nutritional needs  Description: Monitor and assess patient's  nutrition/hydration status for malnutrition. Collaborate with interdisciplinary team and initiate plan and interventions as ordered.  Monitor patient's weight and dietary intake as ordered or per policy. Utilize nutrition screening tool and intervene as necessary. Determine patient's food preferences and provide high-protein, high-caloric foods as appropriate.     INTERVENTIONS:  - Monitor oral intake, urinary output, labs, and treatment plans  - Assess nutrition and hydration status and recommend course of action  - Evaluate amount of meals eaten  - Assist patient with eating if necessary   - Allow adequate time for meals  - Recommend/ encourage appropriate diets, oral nutritional supplements, and vitamin/mineral supplements  - Order, calculate, and assess calorie counts as needed  - Recommend, monitor, and adjust tube feedings and TPN/PPN based on assessed needs  - Assess need for intravenous fluids  - Provide specific nutrition/hydration education as appropriate  - Include patient/family/caregiver in decisions related to nutrition  Outcome: Progressing     Problem: Decreased Cardiac Output  Goal: Cardiac output adequate for individual needs  Description: INTERVENTIONS: Monitor for signs and symptoms of decreased cardiac output   - Monitor for dyspnea with exertion and at rest  - Monitor for orthopnea  - Monitor for signs of tachycardia. Place patient on telemetry monitoring.  - Assess patient for jugular vein distention  - Assess patient for lower extremity edema and poor peripheral perfusion   - Auscultate lung sound for Fine bibasilar crackles   - Monitor for cardiac arrythmias   - Administer beta blockers, antiarrhythmic, and blood pressure medications as ordered    Outcome: Progressing     Problem: Impaired Gas Exchange  Goal: Optimize oxygenation and ensure adequate ventilation  Description: INTERVENTIONS: Monitor for signs and symptoms of respiratory distress                - Elevate HOB or use high fowlers  to promote lung expansion                - Administer oxygen as ordered to maintain adequate oxygenation                - Encourage use of IS to promote lung expansion and prevent PN                - Monitor ABGs to assess oxygenation status                - Monitor blood oxygen level to maintain adequate oxygenation                - Encourage cough and deep breathing exercises to promote lung expansion                - Monitor patient's mental status for increased confusion    Outcome: Progressing     Problem: Excess Fluid Volume  Goal: Patient is able to achieve and maintain homeostasis  Description: INTERVENTIONS: Monitor for sign and symptoms of fluid overload  - Evaluate LE edema every shift  - Elevate LE to prevent dependent edema  - Apply ALONDRA stockings as ordered   - Monitor ankle circumference daily  - Assess for jugular vein distention  - Evaluate provider orders for the CHF diuretic algorithm. Administer diuretics as ordered  - Weigh the patient daily at 0600 and report a weight gain of five pounds or more   - Strict intake and output  - Monitor fluid intake and adhere to fluid restrictions  - Assess lung sounds every shift and as needed  - Monitor vital signs and lab values (CBC, chem, BUN, BNP)  - Measure and document urine output    Outcome: Progressing     Problem: Activity Intolerance  Goal: Patient is able to perform activities within their limitations  Description: INTERVENTIONS:                       -   Alternate periods of activity with periods of rest                 -   Patients is able to maintain normal vitals heart rhythm during activity                 -   Gradually increase activity and exercise as patient can tolerate                 -   Monitor blood pressure and heart before and after exercise                  -   Monitor blood oxygen saturation during activity and apply oxygen as needed    Outcome: Progressing     Problem: Knowledge Deficit  Goal: Patient is able to verbalize  understanding of Heart Failure after education  Description: INTERVENTIONS:  - Educate the patient and family on signs and symptoms of HF  - Provide the patient with HF education and HF zone tool  - Educate on the importance of daily weight in the AM and reporting a weight gain               of 3 or more pounds to their primary care physician  - Monitor for SOB  - Maintain and sodium and fluid restriction  - Educate the patient on the importance of medications such as: diuretics, betablockers,               antiarrhythmics and their purpose, dose, route, side effects and labs               if they are needed    Outcome: Progressing     Problem: Potential for Falls  Goal: Patient will remain free of falls  Description: INTERVENTIONS:  - Educate patient/family on patient safety including physical limitations  - Instruct patient to call for assistance with activity   - Consult OT/PT to assist with strengthening/mobility   - Keep Call bell within reach  - Keep bed low and locked with side rails adjusted as appropriate  - Keep care items and personal belongings within reach  - Initiate and maintain comfort rounds  - Make Fall Risk Sign visible to staff  - Offer Toileting every 2 Hours, in advance of need  - Initiate/Maintain bed alarm  - Obtain necessary fall risk management equipment: yellow socks  - Apply yellow socks and bracelet for high fall risk patients  - Consider moving patient to room near nurses station  Outcome: Progressing

## 2025-02-14 NOTE — PROGRESS NOTES
"Progress Note - Hospitalist   Name: Zee Kirk 74 y.o. female I MRN: [id number]  Unit/Bed#: [facility] I Date of Admission: 1/26/2025   Date of Service: 2/14/2025 I Hospital Day: 18    Assessment & Plan  Panniculitis  POA with redness and drainage of bilateral pan eyes similar to admissions  CT imaging with no obvious abscess  Patient was not candidate for panniculectomy in the past  S/p cefepime and vancomycin IV-now off of antibiotics  Infectious disease input appreciated  Transitioned to comfort measures only 2/6/2025  Chronic heart failure with preserved ejection fraction (HCC)  Wt Readings from Last 3 Encounters:   02/06/25 (!) 208 kg (458 lb 1.9 oz)   01/17/25 (!) 205 kg (451 lb)   01/01/25 (!) 205 kg (451 lb 14.4 oz)     11/7/2024 TTE: LVEF 60% with normal systolic function  Outpatient regimen included furosemide 60 mg twice daily   Nonadherent with outpatient Lasix due to hypotension  Nephrology input appreciated   Lasix discontinued   Transitioned to comfort measures only 2/6/2025  Type 2 diabetes mellitus without complication, without long-term current use of insulin ([facility])  Lab Results   Component Value Date    HGBA1C 7.2 (H) 11/06/2024       No results for input(s): \"POCGLU\" in the last 72 hours.        Blood Sugar Average: Last 72 hrs:    Patient denies any history of diabetes  Transitioned to full comfort measures 2/6/2025, allow pleasure feed  No further labs, testing, or treatments  Longstanding persistent atrial fibrillation (HCC)  Heart rate remains 40s to 50s  Was receiving Coumadin for anticoagulation-discontinued 2/6/2025 after discussion with the family  Cardiology input appreciated  Transitioned to comfort measures only as of 2/6/2025  No further labs, testing, or treatments  Class 3 severe obesity due to excess calories with serious comorbidity and body mass index (BMI) greater than or equal to 70 in adult (HCC)  BMI 81.15  Goals of care, counseling/discussion  Goals of care " discussion initiated  Patient has developed encephalopathy and bradycardia  Hospice evaluation appreciated   Case management making referrals for SNF on hospice.  Search has been expanded  Patient has transitioned to comfort measures only 2/6/2025  Comfort measures only status  See also, plan for goals of care  Patient has transitioned to comfort measures only  Oral medications discontinued, no further labs, testing, or treatments  Comfort medications ordered  Referrals have been made to SNF facilities with hospice services.  Will transfer to facility when arrangements finalized by case management  Hypothyroid  Levothyroxine discontinued  History of Clostridioides difficile infection  S/p oral vancomycin, discontinued   Change in mental status  Patient having acute mental status changes 1/31/2025  UA positive-see plan for acute cystitis without hematuria  Transferred to [facility] 1/31/2025 for stat head CT-negative for acute intracranial abnormality  Per family, patient had confusion with Oxy in the past, this was discontinued  Continues with waxing and waning mentation  Transitioned to comfort measures only 2/6/2025  Acute cystitis without hematuria  UA positive  Patient with numerous antibiotic allergies  S/P treatment with cefepime and vancomycin  Infectious disease input appreciated   Transitioned to comfort measures only 2/6/2025   Metabolic encephalopathy  Due to acute cystitis  CT head negative  Transitioned to comfort measures only 2/6/2025  LARRY (acute kidney injury) (HCC)  Patient on Lasix at home and was requiring blood pressure support with midodrine so Lasix could be given   Appreciate nephrology who followed  Lasix discontinued on 2/6 when transition to comfort measures  Has transitioned to comfort measures only 2/6/2025  Hypotension  Previously on midodrine, discontinued   Bradycardia  Noted to have bradycardia with heart rate in the 40s   Cardiology input appreciated   Please see also, goals of  care discussion   Transitioned to comfort measures only 2/6/2025     VTE Pharmacologic Prophylaxis: VTE Score: 8  CMO    Mobility:   Basic Mobility Inpatient Raw Score: 6  JH-HLM Goal: 2: Bed activities/Dependent transfer  JH-HLM Achieved: 2: Bed activities/Dependent transfer  JH-HLM Goal achieved. Continue to encourage appropriate mobility.    Patient Centered Rounds: I performed bedside rounds with nursing staff today.   Discussions with Specialists or Other Care Team Provider:     Education and Discussions with Family / Patient: Patient declined call to .     Current Length of Stay: 18 day(s)  Current Patient Status: Inpatient   Certification Statement: The patient will continue to require additional inpatient hospital stay due to care coordination.  Discharge Plan:  SNF    Code Status: Level 4 - Comfort Care    Subjective   Evaluated at bedside.  No physical complaints offered.  Verbalizes needs freely.    Objective :  Temp:  [98.2 °F (36.8 °C)] 98.2 °F (36.8 °C)  HR:  [69] 69  BP: (131)/(63) 131/63  Resp:  [17-20] 20  SpO2:  [98 %] 98 %    Body mass index is 81.15 kg/m².     Input and Output Summary (last 24 hours):     Intake/Output Summary (Last 24 hours) at 2/14/2025 1339  Last data filed at 2/14/2025 0900  Gross per 24 hour   Intake 460 ml   Output --   Net 460 ml       Physical Exam  Vitals and nursing note reviewed.   Constitutional:       General: She is not in acute distress.     Appearance: She is obese.   HENT:      Head: Normocephalic and atraumatic.      Nose: Nose normal.      Mouth/Throat:      Mouth: Mucous membranes are moist.      Pharynx: Oropharynx is clear.   Eyes:      Pupils: Pupils are equal, round, and reactive to light.   Cardiovascular:      Rate and Rhythm: Normal rate. Rhythm irregular.      Pulses: Normal pulses.      Heart sounds: Normal heart sounds.   Pulmonary:      Effort: Pulmonary effort is normal. No respiratory distress.      Breath sounds: Normal breath  sounds.   Abdominal:      General: Bowel sounds are normal.      Palpations: Abdomen is soft.      Tenderness: There is no abdominal tenderness.   Musculoskeletal:      Cervical back: Neck supple.      Right lower leg: Edema present.      Left lower leg: Edema present.   Skin:     General: Skin is warm and dry.      Capillary Refill: Capillary refill takes less than 2 seconds.   Neurological:      General: No focal deficit present.      Mental Status: She is alert. Mental status is at baseline.           Lines/Drains:              Lab Results: I have reviewed the following results:                             Recent Cultures (last 7 days):               Last 24 Hours Medication List:     Current Facility-Administered Medications:     acetaminophen (TYLENOL) tablet 650 mg, Q6H PRN    diphenhydrAMINE (BENADRYL) injection 50 mg, Q6H PRN    LORazepam (ATIVAN) tablet 1 mg, Q3H PRN    morphine injection 2 mg, Q3H PRN    Administrative Statements   Today, Patient Was Seen By: ORACIO Gardner      **Please Note: This note may have been constructed using a voice recognition system.**

## 2025-02-14 NOTE — ASSESSMENT & PLAN NOTE
See also, plan for goals of care  Patient has transitioned to comfort measures only  Oral medications discontinued, no further labs, testing, or treatments  Comfort medications ordered  Referrals have been made to SNF facilities with hospice services.  Will transfer to facility when arrangements finalized by case management

## 2025-02-14 NOTE — PROGRESS NOTES
Pastoral Care Progress Note  CH initiated follow up in setting of comfort care. Pt received CH reclined in bed, no family present.    Pt shared that she perceives her discharge is delayed because of winter flu, etc in facilities. Pt shared that she is ok with the delay and has been keeping in touch with friends and family.    CH provided empathetic listening an support.           02/14/25 1000   Clinical Encounter Type   Visited With Patient

## 2025-02-14 NOTE — ASSESSMENT & PLAN NOTE
Goals of care discussion initiated  Patient has developed encephalopathy and bradycardia  Hospice evaluation appreciated   Case management making referrals for SNF on hospice.  Search has been expanded  Patient has transitioned to comfort measures only 2/6/2025

## 2025-02-15 PROCEDURE — 99232 SBSQ HOSP IP/OBS MODERATE 35: CPT | Performed by: NURSE PRACTITIONER

## 2025-02-15 RX ADMIN — LORAZEPAM 1 MG: 1 TABLET ORAL at 22:36

## 2025-02-15 NOTE — ASSESSMENT & PLAN NOTE
Patient having acute mental status changes 1/31/2025  UA positive-see plan for acute cystitis without hematuria  Transferred to [facility] 1/31/2025 for stat head CT-negative for acute intracranial abnormality  Per family, patient had confusion with Oxy in the past, this was discontinued  Continues with waxing and waning mentation  Transitioned to comfort measures only 2/6/2025

## 2025-02-15 NOTE — PLAN OF CARE
Problem: Prexisting or High Potential for Compromised Skin Integrity  Goal: Skin integrity is maintained or improved  Description: INTERVENTIONS:  - Identify patients at risk for skin breakdown  - Assess and monitor skin integrity  - Assess and monitor nutrition and hydration status  - Monitor labs   - Assess for incontinence   - Turn and reposition patient  - Assist with mobility/ambulation  - Relieve pressure over bony prominences  - Avoid friction and shearing  - Provide appropriate hygiene as needed including keeping skin clean and dry  - Evaluate need for skin moisturizer/barrier cream  - Collaborate with interdisciplinary team   - Patient/family teaching  - Consider wound care consult   Outcome: Progressing     Problem: PAIN - ADULT  Goal: Verbalizes/displays adequate comfort level or baseline comfort level  Description: Interventions:  - Encourage patient to monitor pain and request assistance  - Assess pain using appropriate pain scale  - Administer analgesics based on type and severity of pain and evaluate response  - Implement non-pharmacological measures as appropriate and evaluate response  - Consider cultural and social influences on pain and pain management  - Notify physician/advanced practitioner if interventions unsuccessful or patient reports new pain  Outcome: Progressing     Problem: INFECTION - ADULT  Goal: Absence or prevention of progression during hospitalization  Description: INTERVENTIONS:  - Assess and monitor for signs and symptoms of infection  - Monitor lab/diagnostic results  - Monitor all insertion sites, i.e. indwelling lines, tubes, and drains  - Monitor endotracheal if appropriate and nasal secretions for changes in amount and color  - [place] appropriate cooling/warming therapies per order  - Administer medications as ordered  - Instruct and encourage patient and family to use good hand hygiene technique  - Identify and instruct in appropriate isolation precautions for  identified infection/condition  Outcome: Progressing  Goal: Absence of fever/infection during neutropenic period  Description: INTERVENTIONS:  - Monitor WBC    Outcome: Progressing     Problem: SAFETY ADULT  Goal: Patient will remain free of falls  Description: INTERVENTIONS:  - Educate patient/family on patient safety including physical limitations  - Instruct patient to call for assistance with activity   - Consult OT/PT to assist with strengthening/mobility   - Keep Call bell within reach  - Keep bed low and locked with side rails adjusted as appropriate  - Keep care items and personal belongings within reach  - Initiate and maintain comfort rounds  - Make Fall Risk Sign visible to staff  - Offer Toileting every 2 Hours, in advance of need  - Initiate/Maintain bed alarm  - Obtain necessary fall risk management equipment: yellow socks  - Apply yellow socks and bracelet for high fall risk patients  - Consider moving patient to room near nurses station  Outcome: Progressing  Goal: Maintain or return to baseline ADL function  Description: INTERVENTIONS:  -  Assess patient's ability to carry out ADLs; assess patient's baseline for ADL function and identify physical deficits which impact ability to perform ADLs (bathing, care of mouth/teeth, toileting, grooming, dressing, etc.)  - Assess/evaluate cause of self-care deficits   - Assess range of motion  - Assess patient's mobility; develop plan if impaired  - Assess patient's need for assistive devices and provide as appropriate  - Encourage maximum independence but intervene and supervise when necessary  - Involve family in performance of ADLs  - Assess for home care needs following discharge   - Consider OT consult to assist with ADL evaluation and planning for discharge  - Provide patient education as appropriate  Outcome: Progressing  Goal: Maintains/Returns to pre admission functional level  Description: INTERVENTIONS:  - Perform AM-PAC 6 Click Basic Mobility/ Daily  Activity assessment daily.  - Set and communicate daily mobility goal to care team and patient/family/caregiver.   - Collaborate with rehabilitation services on mobility goals if consulted  - Perform Range of Motion 3 times a day.  - Reposition patient every 2 hours.  - Dangle patient 3 times a day  - Stand patient 3 times a day  - Ambulate patient 3 times a day  - Out of bed to chair 3 times a day   - Out of bed for meals 3 times a day  - Out of bed for toileting  - Record patient progress and toleration of activity level   Outcome: Progressing     Problem: DISCHARGE PLANNING  Goal: Discharge to home or other facility with appropriate resources  Description: INTERVENTIONS:  - Identify barriers to discharge w/patient and caregiver  - Arrange for needed discharge resources and transportation as appropriate  - Identify discharge learning needs (meds, wound care, etc.)  - Arrange for interpretive services to assist at discharge as needed  - Refer to Case Management Department for coordinating discharge planning if the patient needs post-hospital services based on physician/advanced practitioner order or complex needs related to functional status, cognitive ability, or social support system  Outcome: Progressing     Problem: Knowledge Deficit  Goal: Patient/family/caregiver demonstrates understanding of disease process, treatment plan, medications, and discharge instructions  Description: Complete learning assessment and assess knowledge base.  Interventions:  - Provide teaching at level of understanding  - Provide teaching via preferred learning methods  Outcome: Progressing     Problem: GASTROINTESTINAL - ADULT  Goal: Minimal or absence of nausea and/or vomiting  Description: INTERVENTIONS:  - Administer IV fluids if ordered to ensure adequate hydration  - Maintain NPO status until nausea and vomiting are resolved  - Nasogastric tube if ordered  - Administer ordered antiemetic medications as needed  - Provide  nonpharmacologic comfort measures as appropriate  - Advance diet as tolerated, if ordered  - Consider nutrition services referral to assist patient with adequate nutrition and appropriate food choices  Outcome: Progressing  Goal: Maintains or returns to baseline bowel function  Description: INTERVENTIONS:  - Assess bowel function  - Encourage oral fluids to ensure adequate hydration  - Administer IV fluids if ordered to ensure adequate hydration  - Administer ordered medications as needed  - Encourage mobilization and activity  - Consider nutritional services referral to assist patient with adequate nutrition and appropriate food choices  Outcome: Progressing  Goal: Maintains adequate nutritional intake  Description: INTERVENTIONS:  - Monitor percentage of each meal consumed  - Identify factors contributing to decreased intake, treat as appropriate  - Assist with meals as needed  - Monitor I&O, weight, and lab values if indicated  - Obtain nutrition services referral as needed  Outcome: Progressing  Goal: Establish and maintain optimal ostomy function  Description: INTERVENTIONS:  - Assess bowel function  - Encourage oral fluids to ensure adequate hydration  - Administer IV fluids if ordered to ensure adequate hydration   - Administer ordered medications as needed  - Encourage mobilization and activity  - Nutrition services referral to assist patient with appropriate food choices  - Assess stoma site  - Consider wound care consult   Outcome: Progressing  Goal: Oral mucous membranes remain intact  Description: INTERVENTIONS  - Assess oral mucosa and hygiene practices  - Implement preventative oral hygiene regimen  - Implement oral medicated treatments as ordered  - Initiate Nutrition services referral as needed  Outcome: Progressing     Problem: Nutrition/Hydration-ADULT  Goal: Nutrient/Hydration intake appropriate for improving, restoring or maintaining nutritional needs  Description: Monitor and assess patient's  nutrition/hydration status for malnutrition. Collaborate with interdisciplinary team and initiate plan and interventions as ordered.  Monitor patient's weight and dietary intake as ordered or per policy. Utilize nutrition screening tool and intervene as necessary. Determine patient's food preferences and provide high-protein, high-caloric foods as appropriate.     INTERVENTIONS:  - Monitor oral intake, urinary output, labs, and treatment plans  - Assess nutrition and hydration status and recommend course of action  - Evaluate amount of meals eaten  - Assist patient with eating if necessary   - Allow adequate time for meals  - Recommend/ encourage appropriate diets, oral nutritional supplements, and vitamin/mineral supplements  - Order, calculate, and assess calorie counts as needed  - Recommend, monitor, and adjust tube feedings and TPN/PPN based on assessed needs  - Assess need for intravenous fluids  - Provide specific nutrition/hydration education as appropriate  - Include patient/family/caregiver in decisions related to nutrition  Outcome: Progressing     Problem: Impaired Gas Exchange  Goal: Optimize oxygenation and ensure adequate ventilation  Description: INTERVENTIONS: Monitor for signs and symptoms of respiratory distress                - Elevate HOB or use high fowlers to promote lung expansion                - Administer oxygen as ordered to maintain adequate oxygenation                - Encourage use of IS to promote lung expansion and prevent PN                - Monitor ABGs to assess oxygenation status                - Monitor blood oxygen level to maintain adequate oxygenation                - Encourage cough and deep breathing exercises to promote lung expansion                - Monitor patient's mental status for increased confusion    Outcome: Progressing     Problem: Excess Fluid Volume  Goal: Patient is able to achieve and maintain homeostasis  Description: INTERVENTIONS: Monitor for sign and  symptoms of fluid overload  - Evaluate LE edema every shift  - Elevate LE to prevent dependent edema  - Apply ALONDRA stockings as ordered   - Monitor ankle circumference daily  - Assess for jugular vein distention  - Evaluate provider orders for the CHF diuretic algorithm. Administer diuretics as ordered  - Weigh the patient daily at 0600 and report a weight gain of five pounds or more   - Strict intake and output  - Monitor fluid intake and adhere to fluid restrictions  - Assess lung sounds every shift and as needed  - Monitor vital signs and lab values (CBC, chem, BUN, BNP)  - Measure and document urine output    Outcome: Progressing     Problem: Activity Intolerance  Goal: Patient is able to perform activities within their limitations  Description: INTERVENTIONS:                       -   Alternate periods of activity with periods of rest                 -   Patients is able to maintain normal vitals heart rhythm during activity                 -   Gradually increase activity and exercise as patient can tolerate                 -   Monitor blood pressure and heart before and after exercise                  -   Monitor blood oxygen saturation during activity and apply oxygen as needed    Outcome: Progressing     Problem: Knowledge Deficit  Goal: Patient is able to verbalize understanding of Heart Failure after education  Description: INTERVENTIONS:  - Educate the patient and family on signs and symptoms of HF  - Provide the patient with HF education and HF zone tool  - Educate on the importance of daily weight in the AM and reporting a weight gain               of 3 or more pounds to their primary care physician  - Monitor for SOB  - Maintain and sodium and fluid restriction  - Educate the patient on the importance of medications such as: diuretics, betablockers,               antiarrhythmics and their purpose, dose, route, side effects and labs               if they are needed    Outcome: Progressing     Problem:  Potential for Falls  Goal: Patient will remain free of falls  Description: INTERVENTIONS:  - Educate patient/family on patient safety including physical limitations  - Instruct patient to call for assistance with activity   - Consult OT/PT to assist with strengthening/mobility   - Keep Call bell within reach  - Keep bed low and locked with side rails adjusted as appropriate  - Keep care items and personal belongings within reach  - Initiate and maintain comfort rounds  - Make Fall Risk Sign visible to staff  - Offer Toileting every 2 Hours, in advance of need  - Initiate/Maintain bed alarm  - Obtain necessary fall risk management equipment: yellow socks  - Apply yellow socks and bracelet for high fall risk patients  - Consider moving patient to room near nurses station  Outcome: Progressing

## 2025-02-15 NOTE — NURSING NOTE
Pt has been sleeping comfortably all AM.  No s/s pain or distress noted.  Call bell is within reach, bed in lowest position.

## 2025-02-15 NOTE — CASE MANAGEMENT
Case Management Discharge Planning Note    Patient name Zee Kirk  Location /-01 MRN [id number]  : 1950 Date 2025       Current Admission Date: 2025  Current Admission Diagnosis:Comfort measures only status   Patient Active Problem List    Diagnosis Date Noted Date Diagnosed    Comfort measures only status 2025     Bradycardia 2025     Stage 3a chronic kidney disease ([facility]) 2025     Metabolic encephalopathy 2025     Acute cystitis without hematuria 2025     Change in mental status 2025     Goals of care, counseling/discussion 2025     Bilateral lower extremity edema 2024     Hypotension 2024     Weakness 2024     Paroxysmal A-fib ([facility]) 2024     History of kidney stones 2024     Skin abnormalities 2024     Cataract, nuclear sclerotic senile, right 2024     Chronic heart failure with preserved ejection fraction ([facility]) 2024     Hypoxic episode 2024     Type 2 diabetes mellitus without complication, without long-term current use of insulin ([facility]) 2024     Long term (current) use of anticoagulants 2024     Subtherapeutic international normalized ratio (INR) 2024     Multiple open wounds 2024     Left leg pain 2023     Shingles 2023     Cellulitis- Ruled Out 2023     Longstanding persistent atrial fibrillation ([facility]) 2022     History of Clostridioides difficile infection 2022     Hypothyroid 10/03/2020     Mild episode of recurrent major depressive disorder ([facility]) 10/03/2020     Idiopathic chronic 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 10/03/2020     Class 3 severe obesity due to excess calories with serious comorbidity and body mass index (BMI) greater than or equal to 70 in adult ([facility]) 10/03/2020     Panniculitis 10/03/2020     Gastroesophageal reflux disease without esophagitis 10/03/2020     Hx MRSA infection 2020     Impaired  mobility 02/03/2019     Osteoarthritis of glenohumeral joint 02/03/2019     Left ovarian cyst 09/20/2017     Depression with anxiety 07/15/2017     Anemia 12/28/2016     LARRY (acute kidney injury) (HCC) 12/27/2016     Adjustment disorder 09/05/2013     Irritable bowel syndrome 12/04/2012     Left atrial enlargement 12/04/2012     Left ventricular hypertrophy 12/04/2012     Carpal tunnel syndrome 05/30/2012     Gout 05/30/2012     Hyperlipidemia 05/30/2012     Symptomatic menopausal or female climacteric states 05/30/2012       LOS (days): 18  Geometric Mean LOS (GMLOS) (days): 4.4  Days to GMLOS:-13.7     OBJECTIVE:  Risk of Unplanned Readmission Score: 30.21         Current admission status: Inpatient   Preferred Pharmacy:   [facility] [place] - [facility]  [place] [address]  Phone: [phone] Fax: [phone]    Primary Care Provider: Franklyn Caruso MD    Primary Insurance: MEDICARE  Secondary Insurance: [facility]    DISCHARGE DETAILS:       Freedom of Choice: Yes  Comments - Freedom of Choice: pt is on comfort care- pt needs a snf bed for hospice care- pt was approved for [facility] hospice for home or snf hospice- addition referrals sent-pt's weight is a barrier for finding an accepting bed-                               Other Referral/Resources/Interventions Provided:  Interventions: SNF, Hospice  Referral Comments: additonal referrals sent via aidin    Would you like to participate in our [facility] service program?  : No - Declined    Treatment Team Recommendation: Hospice (snf on hospice care- BLS)

## 2025-02-15 NOTE — ASSESSMENT & PLAN NOTE
Noted to have bradycardia with heart rate in the 40s   Cardiology input appreciated   Please see also, goals of care discussion   Transitioned to comfort measures only 2/6/2025

## 2025-02-15 NOTE — PROGRESS NOTES
"Progress Note - Hospitalist   Name: Zee Kirk 74 y.o. female I MRN: [id number]  Unit/Bed#: [facility] I Date of Admission: 1/26/2025   Date of Service: 2/15/2025 I Hospital Day: 19    Assessment & Plan  Panniculitis  POA with redness and drainage of bilateral pan eyes similar to admissions  CT imaging with no obvious abscess  Patient was not candidate for panniculectomy in the past  S/p cefepime and vancomycin IV-now off of antibiotics  Infectious disease input appreciated  Transitioned to comfort measures only 2/6/2025  Chronic heart failure with preserved ejection fraction (HCC)  Wt Readings from Last 3 Encounters:   02/06/25 (!) 208 kg (458 lb 1.9 oz)   01/17/25 (!) 205 kg (451 lb)   01/01/25 (!) 205 kg (451 lb 14.4 oz)     11/7/2024 TTE: LVEF 60% with normal systolic function  Outpatient regimen included furosemide 60 mg twice daily   Nonadherent with outpatient Lasix due to hypotension  Nephrology input appreciated   Lasix discontinued   Transitioned to comfort measures only 2/6/2025  Type 2 diabetes mellitus without complication, without long-term current use of insulin ([facility])  Lab Results   Component Value Date    HGBA1C 7.2 (H) 11/06/2024       No results for input(s): \"POCGLU\" in the last 72 hours.        Blood Sugar Average: Last 72 hrs:    Patient denies any history of diabetes  Transitioned to full comfort measures 2/6/2025, allow pleasure feed  No further labs, testing, or treatments  Longstanding persistent atrial fibrillation (HCC)  Heart rate remains 40s to 50s  Was receiving Coumadin for anticoagulation-discontinued 2/6/2025 after discussion with the family  Cardiology input appreciated  Transitioned to comfort measures only as of 2/6/2025  No further labs, testing, or treatments  Class 3 severe obesity due to excess calories with serious comorbidity and body mass index (BMI) greater than or equal to 70 in adult (HCC)  BMI 81.15  Goals of care, counseling/discussion  Goals of care " discussion initiated  Patient has developed encephalopathy and bradycardia  Hospice evaluation appreciated   Case management making referrals for SNF on hospice.  Search has been expanded  Patient has transitioned to comfort measures only 2/6/2025  Comfort measures only status  See also, plan for goals of care  Patient has transitioned to comfort measures only  Oral medications discontinued, no further labs, testing, or treatments  Comfort medications ordered  Referrals have been made to SNF facilities with hospice services.  Will transfer to facility when arrangements finalized by case management  Hypothyroid  Levothyroxine discontinued  History of Clostridioides difficile infection  S/p oral vancomycin, discontinued   Change in mental status  Patient having acute mental status changes 1/31/2025  UA positive-see plan for acute cystitis without hematuria  Transferred to [facility] 1/31/2025 for stat head CT-negative for acute intracranial abnormality  Per family, patient had confusion with Oxy in the past, this was discontinued  Continues with waxing and waning mentation  Transitioned to comfort measures only 2/6/2025  Acute cystitis without hematuria  UA positive  Patient with numerous antibiotic allergies  S/P treatment with cefepime and vancomycin  Infectious disease input appreciated   Transitioned to comfort measures only 2/6/2025   Metabolic encephalopathy  Due to acute cystitis  CT head negative  Transitioned to comfort measures only 2/6/2025  LARRY (acute kidney injury) (HCC)  Patient on Lasix at home and was requiring blood pressure support with midodrine so Lasix could be given   Appreciate nephrology who followed  Lasix discontinued on 2/6 when transition to comfort measures  Has transitioned to comfort measures only 2/6/2025  Hypotension  Previously on midodrine, discontinued   Bradycardia  Noted to have bradycardia with heart rate in the 40s   Cardiology input appreciated   Please see also, goals of  care discussion   Transitioned to comfort measures only 2/6/2025     VTE Pharmacologic Prophylaxis: VTE Score: 8  CMO    Mobility:   Basic Mobility Inpatient Raw Score: 6  JH-HLM Goal: 2: Bed activities/Dependent transfer  JH-HLM Achieved: 2: Bed activities/Dependent transfer  JH-HLM Goal achieved. Continue to encourage appropriate mobility.    Patient Centered Rounds: I performed bedside rounds with nursing staff today.     Education and Discussions with Family / Patient:  No medical updates to provide..     Current Length of Stay: 19 day(s)  Current Patient Status: Inpatient   Certification Statement:  c are coordination.   Discharge Plan: Anticipate discharge in 24-48 hrs to SNF.    Code Status: Level 4 - Comfort Care    Subjective   No complaints offered.  No overnight events.  Says she is comfortable.     Objective :  SpO2:  [97 %-99 %] 99 %  O2 Device: Nasal cannula  Nasal Cannula O2 Flow Rate (L/min):  [3 L/min] 3 L/min    Body mass index is 81.15 kg/m².     Input and Output Summary (last 24 hours):     Intake/Output Summary (Last 24 hours) at 2/15/2025 1239  Last data filed at 2/15/2025 0834  Gross per 24 hour   Intake 712 ml   Output --   Net 712 ml       Physical Exam  Vitals and nursing note reviewed.   Constitutional:       General: She is not in acute distress.     Appearance: She is obese.   HENT:      Head: Normocephalic and atraumatic.      Nose: Nose normal.      Mouth/Throat:      Mouth: Mucous membranes are moist.      Pharynx: Oropharynx is clear.   Eyes:      Pupils: Pupils are equal, round, and reactive to light.   Cardiovascular:      Rate and Rhythm: Normal rate. Rhythm irregular.      Pulses: Normal pulses.   Pulmonary:      Effort: Pulmonary effort is normal. No respiratory distress.      Breath sounds: Normal breath sounds.   Abdominal:      General: Bowel sounds are normal.      Palpations: Abdomen is soft.      Tenderness: There is no abdominal tenderness.   Musculoskeletal:       Cervical back: Neck supple.      Right lower leg: Edema present.      Left lower leg: Edema present.   Skin:     General: Skin is warm and dry.      Capillary Refill: Capillary refill takes less than 2 seconds.   Neurological:      General: No focal deficit present.      Mental Status: She is alert and oriented to person, place, and time.           Lines/Drains:              Lab Results: I have reviewed the following results:                             Recent Cultures (last 7 days):               Last 24 Hours Medication List:     Current Facility-Administered Medications:     acetaminophen (TYLENOL) tablet 650 mg, Q6H PRN    diphenhydrAMINE (BENADRYL) injection 50 mg, Q6H PRN    LORazepam (ATIVAN) tablet 1 mg, Q3H PRN    morphine injection 2 mg, Q3H PRN    Administrative Statements   Today, Patient Was Seen By: ORACIO Gardner      **Please Note: This note may have been constructed using a voice recognition system.**

## 2025-02-16 PROCEDURE — 99231 SBSQ HOSP IP/OBS SF/LOW 25: CPT | Performed by: NURSE PRACTITIONER

## 2025-02-16 RX ORDER — LOPERAMIDE HYDROCHLORIDE 2 MG/1
2 CAPSULE ORAL ONCE
Status: COMPLETED | OUTPATIENT
Start: 2025-02-16 | End: 2025-02-16

## 2025-02-16 RX ADMIN — LOPERAMIDE HYDROCHLORIDE 2 MG: 2 CAPSULE ORAL at 16:29

## 2025-02-16 RX ADMIN — LORAZEPAM 1 MG: 1 TABLET ORAL at 21:14

## 2025-02-16 RX ADMIN — ACETAMINOPHEN 650 MG: 325 TABLET ORAL at 21:14

## 2025-02-16 RX ADMIN — ACETAMINOPHEN 650 MG: 325 TABLET ORAL at 13:46

## 2025-02-16 NOTE — PROGRESS NOTES
"Progress Note - Hospitalist   Name: Zee Kirk 74 y.o. female I MRN: [id number]  Unit/Bed#: [facility] I Date of Admission: 1/26/2025   Date of Service: 2/16/2025 I Hospital Day: 20    Assessment & Plan  Panniculitis  POA with redness and drainage of bilateral pan eyes similar to admissions  CT imaging with no obvious abscess  Patient was not candidate for panniculectomy in the past  S/p cefepime and vancomycin IV-now off of antibiotics  Infectious disease input appreciated  Transitioned to comfort measures only 2/6/2025  Chronic heart failure with preserved ejection fraction (HCC)  Wt Readings from Last 3 Encounters:   02/06/25 (!) 208 kg (458 lb 1.9 oz)   01/17/25 (!) 205 kg (451 lb)   01/01/25 (!) 205 kg (451 lb 14.4 oz)     11/7/2024 TTE: LVEF 60% with normal systolic function  Outpatient regimen included furosemide 60 mg twice daily   Nonadherent with outpatient Lasix due to hypotension  Nephrology input appreciated   Lasix discontinued   Transitioned to comfort measures only 2/6/2025  Type 2 diabetes mellitus without complication, without long-term current use of insulin ([facility])  Lab Results   Component Value Date    HGBA1C 7.2 (H) 11/06/2024       No results for input(s): \"POCGLU\" in the last 72 hours.        Blood Sugar Average: Last 72 hrs:    Patient denies any history of diabetes  Transitioned to full comfort measures 2/6/2025, allow pleasure feed  No further labs, testing, or treatments  Longstanding persistent atrial fibrillation (HCC)  Heart rate remains 40s to 50s  Was receiving Coumadin for anticoagulation-discontinued 2/6/2025 after discussion with the family  Cardiology input appreciated  Transitioned to comfort measures only as of 2/6/2025  No further labs, testing, or treatments  Class 3 severe obesity due to excess calories with serious comorbidity and body mass index (BMI) greater than or equal to 70 in adult (HCC)  BMI 81.15  Goals of care, counseling/discussion  Goals of care " discussion initiated  Patient has developed encephalopathy and bradycardia  Hospice evaluation appreciated   Case management making referrals for SNF on hospice.  Search has been expanded  Patient has transitioned to comfort measures only 2/6/2025  Comfort measures only status  See also, plan for goals of care  Patient has transitioned to comfort measures only  Oral medications discontinued, no further labs, testing, or treatments  Comfort medications ordered  Referrals have been made to SNF facilities with hospice services.  Will transfer to facility when arrangements finalized by case management  Hypothyroid  Levothyroxine discontinued  History of Clostridioides difficile infection  S/p oral vancomycin, discontinued   Change in mental status  Patient having acute mental status changes 1/31/2025  UA positive-see plan for acute cystitis without hematuria  Transferred to [facility] 1/31/2025 for stat head CT-negative for acute intracranial abnormality  Per family, patient had confusion with Oxy in the past, this was discontinued  Continues with waxing and waning mentation  Transitioned to comfort measures only 2/6/2025  Acute cystitis without hematuria  UA positive  Patient with numerous antibiotic allergies  S/P treatment with cefepime and vancomycin  Infectious disease input appreciated   Transitioned to comfort measures only 2/6/2025   Metabolic encephalopathy  Due to acute cystitis  CT head negative  Transitioned to comfort measures only 2/6/2025  LARRY (acute kidney injury) (HCC)  Patient on Lasix at home and was requiring blood pressure support with midodrine so Lasix could be given   Appreciate nephrology who followed  Lasix discontinued on 2/6 when transition to comfort measures  Has transitioned to comfort measures only 2/6/2025  Hypotension  Previously on midodrine, discontinued   Bradycardia  Noted to have bradycardia with heart rate in the 40s   Cardiology input appreciated   Please see also, goals of  care discussion   Transitioned to comfort measures only 2/6/2025     VTE Pharmacologic Prophylaxis: VTE Score: 8  CMO    Mobility:   Basic Mobility Inpatient Raw Score: 6  JH-HLM Goal: 2: Bed activities/Dependent transfer  JH-HLM Achieved: 2: Bed activities/Dependent transfer  JH-HLM Goal achieved. Continue to encourage appropriate mobility.    Patient Centered Rounds: I performed bedside rounds with nursing staff today.     Education and Discussions with Family / Patient: Patient declined call to .     Current Length of Stay: 20 day(s)  Current Patient Status: Inpatient   Certification Statement: The patient will continue to require additional inpatient hospital stay due to care coordination.  Discharge Plan: Anticipate discharge in 24-48 hrs to SNF.    Code Status: Level 4 - Comfort Care    Subjective   Evaluated at bedside    Objective :  Temp:  [97.7 °F (36.5 °C)] 97.7 °F (36.5 °C)  HR:  [79] 79  BP: (139)/(76) 139/76  Resp:  [18] 18  SpO2:  [93 %] 93 %    Body mass index is 81.15 kg/m².     Input and Output Summary (last 24 hours):     Intake/Output Summary (Last 24 hours) at 2/16/2025 0903  Last data filed at 2/16/2025 0609  Gross per 24 hour   Intake 360 ml   Output --   Net 360 ml       Physical Exam  Vitals and nursing note reviewed.   Constitutional:       General: She is not in acute distress.     Appearance: She is obese.   HENT:      Head: Normocephalic and atraumatic.      Nose: Nose normal.      Mouth/Throat:      Mouth: Mucous membranes are moist.      Pharynx: Oropharynx is clear.   Eyes:      Pupils: Pupils are equal, round, and reactive to light.   Cardiovascular:      Rate and Rhythm: Normal rate. Rhythm irregular.      Pulses: Normal pulses.      Heart sounds: Normal heart sounds.   Pulmonary:      Effort: Pulmonary effort is normal. No respiratory distress.      Breath sounds: Normal breath sounds.   Abdominal:      General: Bowel sounds are normal.      Palpations: Abdomen is  soft.      Tenderness: There is no abdominal tenderness.      Comments: There is no redness or drainage to bilateral panni   Musculoskeletal:      Cervical back: Neck supple.      Right lower leg: No edema.      Left lower leg: No edema.   Skin:     General: Skin is warm and dry.      Capillary Refill: Capillary refill takes less than 2 seconds.   Neurological:      General: No focal deficit present.      Mental Status: She is alert. Mental status is at baseline.           Lines/Drains:              Lab Results: I have reviewed the following results:                             Recent Cultures (last 7 days):               Last 24 Hours Medication List:     Current Facility-Administered Medications:     acetaminophen (TYLENOL) tablet 650 mg, Q6H PRN    diphenhydrAMINE (BENADRYL) injection 50 mg, Q6H PRN    LORazepam (ATIVAN) tablet 1 mg, Q3H PRN    morphine injection 2 mg, Q3H PRN    Administrative Statements   Today, Patient Was Seen By: ORACIO Gardner      **Please Note: This note may have been constructed using a voice recognition system.**

## 2025-02-16 NOTE — ASSESSMENT & PLAN NOTE
Patient having acute mental status changes 1/31/2025  UA positive-see plan for acute cystitis without hematuria  Transferred to [facility] 1/31/2025 for stat head CT-negative for acute intracranial abnormality  Per family, patient had confusion with Oxy in the past, this was discontinued  Continues with waxing and waning mentation  Transitioned to comfort measures only 2/6/2025

## 2025-02-16 NOTE — PLAN OF CARE
Problem: PAIN - ADULT  Goal: Verbalizes/displays adequate comfort level or baseline comfort level  Description: Interventions:  - Encourage patient to monitor pain and request assistance  - Assess pain using appropriate pain scale  - Administer analgesics based on type and severity of pain and evaluate response  - Implement non-pharmacological measures as appropriate and evaluate response  - Consider cultural and social influences on pain and pain management  - Notify physician/advanced practitioner if interventions unsuccessful or patient reports new pain  Outcome: Progressing     Problem: SAFETY ADULT  Goal: Patient will remain free of falls  Description: INTERVENTIONS:  - Educate patient/family on patient safety including physical limitations  - Instruct patient to call for assistance with activity   - Consult OT/PT to assist with strengthening/mobility   - Keep Call bell within reach  - Keep bed low and locked with side rails adjusted as appropriate  - Keep care items and personal belongings within reach  - Initiate and maintain comfort rounds  - Make Fall Risk Sign visible to staff  - Offer Toileting every 2 Hours, in advance of need  - Initiate/Maintain bed alarm  - Obtain necessary fall risk management equipment: yellow socks  - Apply yellow socks and bracelet for high fall risk patients  - Consider moving patient to room near nurses station  Outcome: Progressing

## 2025-02-17 PROCEDURE — 99231 SBSQ HOSP IP/OBS SF/LOW 25: CPT | Performed by: NURSE PRACTITIONER

## 2025-02-17 NOTE — UTILIZATION REVIEW
Continued Stay Review    Date: 2/16/25                          Current Patient Class: Inpatient  Current Level of Care: Med Surg     HPI:74 y.o. female initially admitted on 1/27/2025   Current Diagnosis:Panniculitis, now on comfort measures only, awaiting SNF placement on hospice services.     Assessment/Plan: Hospitalist: No drainage or redness to bilateral panni. Heart rhythm irregular. Having loose bm's, 1 imodium given. Has received 2 doses of tylenol, 1 dose of Lorazepam today.   Pt is on comfort measures and is awaiting placement to a SNF on Hospice services. CM working on placement. Pt's weight is a barrier for finding SNF bed, 458 lbs1.9 oz. Plan: Continue comfort measures ,pleasure feeds, wound care, comfort medications.     Certification Statement: The patient will continue to require additional inpatient hospital stay due to care coordination.  Discharge Plan: Anticipate discharge in 24-48 hrs to SNF.    Medications:   Scheduled Medications: none      Continuous IV Infusions: none      PRN Meds:  acetaminophen, 650 mg, Oral, Q6H PRN x2 doses 2/16   diphenhydrAMINE, 50 mg, Intravenous, Q6H PRN  LORazepam, 1 mg, Oral, Q3H PRN x1 dose 2/16   morphine injection, 2 mg, Intravenous, Q3H PRN      Discharge Plan: TBD     Vital Signs (last 3 days)       Date/Time Temp Pulse Resp BP MAP (mmHg) SpO2 Calculated FIO2 (%) - Nasal Cannula Nasal Cannula O2 Flow Rate (L/min) O2 Device Dendron Coma Scale Score Pain    02/17/25 0900 -- -- -- -- -- -- -- -- -- 15 No Pain    02/17/25 07:46:46 -- 65 19 -- -- 98 % -- -- -- -- --    02/16/25 2300 -- -- -- -- -- -- 32 3 L/min Nasal cannula 15 7    02/16/25 2114 -- -- -- -- -- -- -- -- -- -- 8    02/16/25 21:12:26 -- 66 18 128/64 85 98 % -- -- -- -- --    02/16/25 1631 -- -- -- -- -- -- -- -- -- -- 8    02/16/25 1346 -- -- -- -- -- -- -- -- -- -- 9    02/16/25 0900 -- -- -- -- -- -- -- -- -- 15 No Pain    02/15/25 21:56:11 97.7 °F (36.5 °C) 79 18 139/76 97 93 % -- -- -- -- --     02/15/25 2030 -- -- -- -- -- -- -- -- -- 15 No Pain    02/15/25 1130 -- -- -- -- -- -- -- -- -- 15 No Pain    02/15/25 0027 -- -- -- -- -- 99 % 32 3 L/min Nasal cannula 14 No Pain    02/14/25 1357 -- -- -- -- -- 97 % 32 3 L/min Nasal cannula 14 No Pain    02/14/25 08:39:46 98.2 °F (36.8 °C) 69 20 131/63 86 98 % -- -- -- -- --    02/14/25 0120 -- -- -- -- -- 98 % -- -- -- 14 No Pain          Weight (last 2 days)       None            Pertinent Labs/Diagnostic Results:   Radiology:  CT head wo contrast   Final Interpretation by Ant Patricio MD (02/01 0021)      No acute intracranial hemorrhage, midline shift, or mass effect. Chronic small vessel ischemic changes.                  Workstation performed: [id number]         CT abdomen pelvis with contrast   Final Interpretation by Ant Patricio MD (01/26 2305)      1.  Right nephrolithiasis with 6 mm calculus in the right renal pelvis. No hydronephrosis.   2.  Diverticulosis without evidence of diverticulitis.         Workstation performed: [id number]                          Network Utilization Review Department  ATTENTION: Please call with any questions or concerns to [phone] and carefully listen to the prompts so that you are directed to the right person. All voicemails are confidential.   For Discharge needs, contact Care Management DC Support Team at [phone] opt. 2  Send all requests for admission clinical reviews, approved or denied determinations and any other requests to dedicated fax number below belonging to the campus where the patient is receiving treatment. List of dedicated fax numbers for the Facilities:  FACILITY NAME UR FAX NUMBER   ADMISSION DENIALS (Administrative/Medical Necessity) [phone]   DISCHARGE SUPPORT TEAM (NETWORK) [phone]   PARENT CHILD HEALTH (Maternity/NICU/Pediatrics) [phone]   [facility] [phone]   [facility] [phone]   Critical access hospital -  [facility] [phone]   [facility] [phone]   [facility] [phone]   [facility] [phone]   [facility] [phone]   [facility] [phone]   [facility] [phone]   [facility] [phone]   [facility] [phone]   [facility] [phone]

## 2025-02-17 NOTE — PLAN OF CARE
Problem: PAIN - ADULT  Goal: Verbalizes/displays adequate comfort level or baseline comfort level  Description: Interventions:  - Encourage patient to monitor pain and request assistance  - Assess pain using appropriate pain scale  - Administer analgesics based on type and severity of pain and evaluate response  - Implement non-pharmacological measures as appropriate and evaluate response  - Consider cultural and social influences on pain and pain management  - Notify physician/advanced practitioner if interventions unsuccessful or patient reports new pain  Outcome: Progressing     Problem: INFECTION - ADULT  Goal: Absence or prevention of progression during hospitalization  Description: INTERVENTIONS:  - Assess and monitor for signs and symptoms of infection  - Monitor lab/diagnostic results  - Monitor all insertion sites, i.e. indwelling lines, tubes, and drains  - Monitor endotracheal if appropriate and nasal secretions for changes in amount and color  - [place] appropriate cooling/warming therapies per order  - Administer medications as ordered  - Instruct and encourage patient and family to use good hand hygiene technique  - Identify and instruct in appropriate isolation precautions for identified infection/condition  Outcome: Progressing

## 2025-02-17 NOTE — PHYSICIAN ADVISOR
Current patient class: Inpatient  The patient is currently on Hospital Day: 23      The patient was admitted to the hospital at  5:17 PM on 1/27/25 for the following diagnosis:  Diarrhea [R19.7]  Cellulitis [L03.90]  Diverticulosis [K57.90]  Obesity [E66.9]  Chronic anemia [D64.9]  Abdominal wall cellulitis [L03.311]     CMS OUTLIER STAY REVIEW    After review of the relevant documentation, labs, vital signs and test results, the patient is appropriate for CONTINUED INPATIENT ADMISSION.       This review is conducted at 10 day intervals, to help satisfy the requirements for significant outlier stay review as per CMS.  Given the current condition of this patient, the patient satisfies this review was determination for continued inpatient stay.    Rationale is as follows:The patient is a 74 yrs old Female who presented to the ED at 1/26/2025  1:43 PM with a chief complaint of Diarrhea and Cellulitis (abdomen) POA with redness and drainage CT imaging with no obvious abscess. Patient was not candidate for panniculectomy in the past. S/p cefepime and vancomycin IV-now off of antibiotics  Infectious disease input appreciated and Transitioned to comfort measures only 2/6/2025. Awaiting placement, case management has expanded search for skilled facility.     The patient’s vitals on arrival were   ED Triage Vitals [01/26/25 1348]   Temperature Pulse Respirations Blood Pressure SpO2   98.3 °F (36.8 °C) 74 20 126/80 98 %      Temp Source Heart Rate Source Patient Position - Orthostatic VS BP Location FiO2 (%)   Tympanic Monitor Sitting Left arm --      Pain Score       No Pain           Past Medical History:   Diagnosis Date    Atrial fibrillation (HCC)     Bladder stone     C. difficile colitis     Cataract, nuclear sclerotic senile, right 12/05/2024    Cellulitis     CHF (congestive heart failure) (HCC)     Depression with anxiety     Disease of thyroid gland     Diverticulitis     Enterocolitis     History of abnormal  "cervical Pap smear     Kidney stone     Lymphedema     Menopause     Age 50    Morbid obesity with BMI of 70 and over, adult (HCC)     MRSA (methicillin resistant Staphylococcus aureus)     abdominal wound    Osteoarthritis     Phlebitis     left lower leg    Sleep apnea     Spondylolysis      Past Surgical History:   Procedure Laterality Date    APPENDECTOMY      CARPAL TUNNEL RELEASE      CATARACT EXTRACTION Right     CHOLECYSTECTOMY      CYSTOSCOPY      stent    EGD      FOOT SURGERY  1982    bone spur    KNEE SURGERY      [place] ICAPSULAR CATARACT XTRJ INSJ IO LENS PRSTH 1 STG Right 12/05/2024    Procedure: CATARACT EXTRACTION WITH IOL IMPLANTATION;  Surgeon: Chantel Llanos MD;  Location: [facility];  Service: Ophthalmology    WISDOM TOOTH EXTRACTION             Consults have been placed to:   IP CONSULT TO PHARMACY  IP CONSULT TO HOSPICE  IP CONSULT TO INFECTIOUS DISEASES  IP CONSULT TO NEPHROLOGY  IP CONSULT TO CARDIOLOGY    Vitals:    02/15/25 0027 02/15/25 2156 02/16/25 2112 02/17/25 0746   BP:  139/76 128/64    Pulse:  79 66 65   Resp:  18 18 19   Temp:  97.7 °F (36.5 °C)     TempSrc:       SpO2: 99% 93% 98% 98%   Weight:       Height:           Most recent labs:    No results for input(s): \"WBC\", \"HGB\", \"HCT\", \"PLT\", \"K\", \"NA\", \"CALCIUM\", \"BUN\", \"CREATININE\", \"LIPASE\", \"AMYLASE\", \"INR\", \"TROPONINI\", \"CKTOTAL\", \"AST\", \"ALT\", \"ALKPHOS\", \"BILITOT\" in the last 72 hours.    Scheduled Meds:  Current Facility-Administered Medications   Medication Dose Route Frequency Provider Last Rate    acetaminophen  650 mg Oral Q6H PRN ORACIO Last      diphenhydrAMINE  50 mg Intravenous Q6H PRN Anjelica Dey PA-C      LORazepam  1 mg Oral Q3H PRN Anjelica Dey PA-C      morphine injection  2 mg Intravenous Q3H PRN ORACIO Last       Continuous Infusions:   PRN Meds:.  acetaminophen    diphenhydrAMINE    LORazepam    morphine injection    Surgical procedures (if appropriate):        "

## 2025-02-17 NOTE — PLAN OF CARE
Problem: SAFETY ADULT  Goal: Patient will remain free of falls  Description: INTERVENTIONS:  - Educate patient/family on patient safety including physical limitations  - Instruct patient to call for assistance with activity   - Consult OT/PT to assist with strengthening/mobility   - Keep Call bell within reach  - Keep bed low and locked with side rails adjusted as appropriate  - Keep care items and personal belongings within reach  - Initiate and maintain comfort rounds  - Make Fall Risk Sign visible to staff  - Offer Toileting every 2 Hours, in advance of need  - Initiate/Maintain bed alarm  - Obtain necessary fall risk management equipment: yellow socks  - Apply yellow socks and bracelet for high fall risk patients  - Consider moving patient to room near nurses station  Outcome: Progressing

## 2025-02-17 NOTE — ASSESSMENT & PLAN NOTE
Noted to have bradycardia with heart rate in the 40s   Cardiology input appreciated   Please see also, goals of care discussion   Transitioned to comfort measures only 2/6/2025    Dario Bailey

## 2025-02-17 NOTE — CASE MANAGEMENT
Case Management Discharge Planning Note    Patient name Zee Kirk  Location /-01 MRN [id number]  : 1950 Date 2025       Current Admission Date: 2025  Current Admission Diagnosis:Comfort measures only status   Patient Active Problem List    Diagnosis Date Noted Date Diagnosed    Comfort measures only status 2025     Bradycardia 2025     Stage 3a chronic kidney disease ([facility]) 2025     Metabolic encephalopathy 2025     Acute cystitis without hematuria 2025     Change in mental status 2025     Goals of care, counseling/discussion 2025     Bilateral lower extremity edema 2024     Hypotension 2024     Weakness 2024     Paroxysmal A-fib ([facility]) 2024     History of kidney stones 2024     Skin abnormalities 2024     Cataract, nuclear sclerotic senile, right 2024     Chronic heart failure with preserved ejection fraction ([facility]) 2024     Hypoxic episode 2024     Type 2 diabetes mellitus without complication, without long-term current use of insulin ([facility]) 2024     Long term (current) use of anticoagulants 2024     Subtherapeutic international normalized ratio (INR) 2024     Multiple open wounds 2024     Left leg pain 2023     Shingles 2023     Cellulitis- Ruled Out 2023     Longstanding persistent atrial fibrillation ([facility]) 2022     History of Clostridioides difficile infection 2022     Hypothyroid 10/03/2020     Mild episode of recurrent major depressive disorder ([facility]) 10/03/2020     Idiopathic chronic 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 10/03/2020     Class 3 severe obesity due to excess calories with serious comorbidity and body mass index (BMI) greater than or equal to 70 in adult ([facility]) 10/03/2020     Panniculitis 10/03/2020     Gastroesophageal reflux disease without esophagitis 10/03/2020     Hx MRSA infection 2020     Impaired  mobility 02/03/2019     Osteoarthritis of glenohumeral joint 02/03/2019     Left ovarian cyst 09/20/2017     Depression with anxiety 07/15/2017     Anemia 12/28/2016     LARRY (acute kidney injury) (HCC) 12/27/2016     Adjustment disorder 09/05/2013     Irritable bowel syndrome 12/04/2012     Left atrial enlargement 12/04/2012     Left ventricular hypertrophy 12/04/2012     Carpal tunnel syndrome 05/30/2012     Gout 05/30/2012     Hyperlipidemia 05/30/2012     Symptomatic menopausal or female climacteric states 05/30/2012       LOS (days): 21  Geometric Mean LOS (GMLOS) (days): 4.4  Days to GMLOS:-16.6     OBJECTIVE:  Risk of Unplanned Readmission Score: 31.6         Current admission status: Inpatient   Preferred Pharmacy:   [facility] [place] [facility] - [facility] [address]  [place] [address]  Phone: [phone] Fax: [phone]    Primary Care Provider: Franklyn Caruso MD    Primary Insurance: MEDICARE  Secondary Insurance: [facility]    DISCHARGE DETAILS:    Discharge planning discussed with:: jackie was called at 9:41 am & 14:08 pm LM & 15:20 pm LM  Freedom of Choice: Yes  Comments - Freedom of Choice: pt is on comfort care-  referrals have been sent via aidin for snf bed for hospice care - pt was accepted by [facility] hospice for home or snf hospice care- pt's weight is a barrier-  cm did review the list of accepting snf's -Jackie stated she needed to talk to the - auth is needed for hospice care with [facility]  CM contacted family/caregiver?: Yes             Contacts  Patient Contacts: Heatehr  Relationship to Patient:: Family  Contact Method: Phone  Phone Number: [phone]  Reason/Outcome: Discharge Planning    Requested Home Health Care         Is the patient interested in HHC at discharge?: No    DME Referral Provided  Referral made for DME?: No    Other Referral/Resources/Interventions Provided:  Interventions: SNF, Hospice  Referral Comments: snf with hopsice  césar -  audrey needed for snf for hopsice care with [facility]         Treatment Team Recommendation: SNF, Hospice (snf & hospice care  - need a auth - BLS)

## 2025-02-17 NOTE — PROGRESS NOTES
"Progress Note - Hospitalist   Name: Zee Kirk 74 y.o. female I MRN: [id number]  Unit/Bed#: -[facility] I Date of Admission: 1/26/2025   Date of Service: 2/17/2025 I Hospital Day: 21    Assessment & Plan  Panniculitis  POA with redness and drainage of bilateral pan eyes similar to admissions  CT imaging with no obvious abscess  Patient was not candidate for panniculectomy in the past  S/p cefepime and vancomycin IV-now off of antibiotics  Infectious disease input appreciated  Transitioned to comfort measures only 2/6/2025  Chronic heart failure with preserved ejection fraction (HCC)  Wt Readings from Last 3 Encounters:   02/06/25 (!) 208 kg (458 lb 1.9 oz)   01/17/25 (!) 205 kg (451 lb)   01/01/25 (!) 205 kg (451 lb 14.4 oz)     11/7/2024 TTE: LVEF 60% with normal systolic function  Outpatient regimen included furosemide 60 mg twice daily   Nonadherent with outpatient Lasix due to hypotension  Nephrology input appreciated   Lasix discontinued   Transitioned to comfort measures only 2/6/2025  Type 2 diabetes mellitus without complication, without long-term current use of insulin ([facility])  Lab Results   Component Value Date    HGBA1C 7.2 (H) 11/06/2024       No results for input(s): \"POCGLU\" in the last 72 hours.        Blood Sugar Average: Last 72 hrs:    Patient denies any history of diabetes  Transitioned to full comfort measures 2/6/2025, allow pleasure feed  No further labs, testing, or treatments  Longstanding persistent atrial fibrillation (HCC)  Heart rate remains 40s to 50s  Was receiving Coumadin for anticoagulation-discontinued 2/6/2025 after discussion with the family  Cardiology input appreciated  Transitioned to comfort measures only as of 2/6/2025  No further labs, testing, or treatments  Class 3 severe obesity due to excess calories with serious comorbidity and body mass index (BMI) greater than or equal to 70 in adult (HCC)  BMI 81.15  Goals of care, counseling/discussion  Goals of care " discussion initiated  Patient has developed encephalopathy and bradycardia  Hospice evaluation appreciated   Case management making referrals for SNF on hospice.  Search has been expanded  Patient has transitioned to comfort measures only 2/6/2025  Comfort measures only status  See also, plan for goals of care  Patient has transitioned to comfort measures only  Oral medications discontinued, no further labs, testing, or treatments  Comfort medications ordered  Referrals have been made to SNF facilities with hospice services.  Will transfer to facility when arrangements finalized by case management  Hypothyroid  Levothyroxine discontinued  History of Clostridioides difficile infection  S/p oral vancomycin, discontinued   Change in mental status  Patient having acute mental status changes 1/31/2025  UA positive-see plan for acute cystitis without hematuria  Transferred to [facility] 1/31/2025 for stat head CT-negative for acute intracranial abnormality  Per family, patient had confusion with Oxy in the past, this was discontinued  Continues with waxing and waning mentation  Transitioned to comfort measures only 2/6/2025  Acute cystitis without hematuria  UA positive  Patient with numerous antibiotic allergies  S/P treatment with cefepime and vancomycin  Infectious disease input appreciated   Transitioned to comfort measures only 2/6/2025   Metabolic encephalopathy  Due to acute cystitis  CT head negative  Transitioned to comfort measures only 2/6/2025  LARRY (acute kidney injury) (HCC)  Patient on Lasix at home and was requiring blood pressure support with midodrine so Lasix could be given   Appreciate nephrology who followed  Lasix discontinued on 2/6 when transition to comfort measures  Has transitioned to comfort measures only 2/6/2025  Hypotension  Previously on midodrine, discontinued   Bradycardia  Noted to have bradycardia with heart rate in the 40s   Cardiology input appreciated   Please see also, goals of  care discussion   Transitioned to comfort measures only 2/6/2025     VTE Pharmacologic Prophylaxis: VTE Score: 8  CMO    Mobility:   Basic Mobility Inpatient Raw Score: 12  JH-HLM Goal: 4: Move to chair/commode  JH-HLM Achieved: 2: Bed activities/Dependent transfer  JH-HLM Goal NOT achieved. Continue with multidisciplinary rounding and encourage appropriate mobility to improve upon JH-HLM goals.    Patient Centered Rounds: I performed bedside rounds with nursing staff today.   Discussions with Specialists or Other Care Team Provider: Case management    Education and Discussions with Family / Patient:  Patient was updated.     Current Length of Stay: 21 day(s)  Current Patient Status: Inpatient   Certification Statement: The patient will continue to require additional inpatient hospital stay due to care coordination.  Discharge Plan: Anticipate discharge in 24-48 hrs to SNF    Code Status: Level 4 - Comfort Care    Subjective   Evaluated at bedside.  No physical complaints offered.  Verbalizes comfort.    Objective :  HR:  [65-66] 65  BP: (128)/(64) 128/64  Resp:  [18-19] 19  SpO2:  [98 %] 98 %  O2 Device: Nasal cannula  Nasal Cannula O2 Flow Rate (L/min):  [3 L/min] 3 L/min    Body mass index is 81.15 kg/m².     Input and Output Summary (last 24 hours):     Intake/Output Summary (Last 24 hours) at 2/17/2025 1506  Last data filed at 2/17/2025 1045  Gross per 24 hour   Intake 240 ml   Output 408 ml   Net -168 ml       Physical Exam  Vitals and nursing note reviewed.   Constitutional:       General: She is not in acute distress.     Appearance: She is obese.   HENT:      Head: Normocephalic and atraumatic.      Nose: Nose normal.      Mouth/Throat:      Mouth: Mucous membranes are moist.      Pharynx: Oropharynx is clear.   Eyes:      Pupils: Pupils are equal, round, and reactive to light.   Cardiovascular:      Rate and Rhythm: Normal rate and regular rhythm.      Pulses: Normal pulses.      Heart sounds: Normal heart  sounds.   Pulmonary:      Effort: Pulmonary effort is normal. No respiratory distress.      Breath sounds: Normal breath sounds.   Abdominal:      General: Bowel sounds are normal.      Palpations: Abdomen is soft.      Tenderness: There is no abdominal tenderness.   Musculoskeletal:      Cervical back: Neck supple.      Right lower leg: Edema present.      Left lower leg: Edema present.   Skin:     General: Skin is warm and dry.      Capillary Refill: Capillary refill takes less than 2 seconds.   Neurological:      General: No focal deficit present.      Mental Status: She is alert. Mental status is at baseline.           Lines/Drains:              Lab Results: I have reviewed the following results:                             Recent Cultures (last 7 days):               Last 24 Hours Medication List:     Current Facility-Administered Medications:     acetaminophen (TYLENOL) tablet 650 mg, Q6H PRN    diphenhydrAMINE (BENADRYL) injection 50 mg, Q6H PRN    LORazepam (ATIVAN) tablet 1 mg, Q3H PRN    morphine injection 2 mg, Q3H PRN    Administrative Statements   Today, Patient Was Seen By: ORACIO Gardner      **Please Note: This note may have been constructed using a voice recognition system.**

## 2025-02-18 PROCEDURE — 99231 SBSQ HOSP IP/OBS SF/LOW 25: CPT | Performed by: NURSE PRACTITIONER

## 2025-02-18 RX ORDER — ONDANSETRON 2 MG/ML
4 INJECTION INTRAMUSCULAR; INTRAVENOUS EVERY 6 HOURS PRN
Status: DISCONTINUED | OUTPATIENT
Start: 2025-02-18 | End: 2025-02-21 | Stop reason: HOSPADM

## 2025-02-18 RX ADMIN — ACETAMINOPHEN 650 MG: 325 TABLET ORAL at 12:15

## 2025-02-18 RX ADMIN — LORAZEPAM 1 MG: 1 TABLET ORAL at 00:49

## 2025-02-18 RX ADMIN — ACETAMINOPHEN 650 MG: 325 TABLET ORAL at 00:49

## 2025-02-18 NOTE — CASE MANAGEMENT
Case Management Discharge Planning Note    Patient name Zee Kirk  Location /-01 MRN [id number]  : 1950 Date 2025       Current Admission Date: 2025  Current Admission Diagnosis:Comfort measures only status   Patient Active Problem List    Diagnosis Date Noted Date Diagnosed    Comfort measures only status 2025     Bradycardia 2025     Stage 3a chronic kidney disease ([facility]) 2025     Metabolic encephalopathy 2025     Acute cystitis without hematuria 2025     Change in mental status 2025     Goals of care, counseling/discussion 2025     Bilateral lower extremity edema 2024     Hypotension 2024     Weakness 2024     Paroxysmal A-fib ([facility]) 2024     History of kidney stones 2024     Skin abnormalities 2024     Cataract, nuclear sclerotic senile, right 2024     Chronic heart failure with preserved ejection fraction ([facility]) 2024     Hypoxic episode 2024     Type 2 diabetes mellitus without complication, without long-term current use of insulin ([facility]) 2024     Long term (current) use of anticoagulants 2024     Subtherapeutic international normalized ratio (INR) 2024     Multiple open wounds 2024     Left leg pain 2023     Shingles 2023     Cellulitis- Ruled Out 2023     Longstanding persistent atrial fibrillation ([facility]) 2022     History of Clostridioides difficile infection 2022     Hypothyroid 10/03/2020     Mild episode of recurrent major depressive disorder ([facility]) 10/03/2020     Idiopathic chronic 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 10/03/2020     Class 3 severe obesity due to excess calories with serious comorbidity and body mass index (BMI) greater than or equal to 70 in adult ([facility]) 10/03/2020     Panniculitis 10/03/2020     Gastroesophageal reflux disease without esophagitis 10/03/2020     Hx MRSA infection 2020     Impaired  mobility 02/03/2019     Osteoarthritis of glenohumeral joint 02/03/2019     Left ovarian cyst 09/20/2017     Depression with anxiety 07/15/2017     Anemia 12/28/2016     LARRY (acute kidney injury) (HCC) 12/27/2016     Adjustment disorder 09/05/2013     Irritable bowel syndrome 12/04/2012     Left atrial enlargement 12/04/2012     Left ventricular hypertrophy 12/04/2012     Carpal tunnel syndrome 05/30/2012     Gout 05/30/2012     Hyperlipidemia 05/30/2012     Symptomatic menopausal or female climacteric states 05/30/2012       LOS (days): 22  Geometric Mean LOS (GMLOS) (days): 4.4  Days to GMLOS:-17.6     OBJECTIVE:  Risk of Unplanned Readmission Score: 31.71         Current admission status: Inpatient   Preferred Pharmacy:   [facility]  [place] [address]  Phone: [phone] Fax: [phone]    Primary Care Provider: Franklyn Caruso MD    Primary Insurance: MEDICARE  Secondary Insurance: [facility]    DISCHARGE DETAILS:    Discharge planning discussed with:: Rachelle was called severarl times today - cm called Walta & spoke several times to day - cm spoke with the pt's service coordinato Teri at 4:04 pm  Freedom of Choice: Yes  Comments - Freedom of Choice: pt is on comfort care- referrals were sent via aidin - cm called to see what the family had agreed on- cm was given permission to reserve [place]- they would like the pt to go home with hopsice care if possible  CM contacted family/caregiver?: Yes             Contacts  Patient Contacts: Rachelleher [place]  Relationship to Patient:: Family (niece)  Contact Method: Phone  Phone Number: [phone]  work # [phone]  Reason/Outcome: Discharge Planning    Requested Home Health Care         Is the patient interested in HHC at discharge?: No    DME Referral Provided  Referral made for DME?: No    Other Referral/Resources/Interventions Provided:  Interventions: Hospice, SNF  Referral Comments:  snf with hospice   - need auth from [facility] for room & board    Would you like to participate in our [facility] service program?  : No - Declined                                                 Additional Comments: pt wants to go h[place] on hospic care if possible- cm placed several calls to [facility] today [phone]-  pt does not have 16hrs of care at home- pt had on 12hrs of care a day prior to admission-cm wasa made aware that they may not caregivers availsble for the pt to go home with caregiver-  cm did reach out to [facility] hospice liaison and she did have the pt re-evaluated for hospice- pt was re-approved for hospice care-  pt needs to have 24hrs caregivers in order to have home hospice-  cm spoke with Teri her serivce coordinator #  [phone]  she stated it will take sveral days to get approval for 24hr care and they are not sure they have the caregivers available -  I did ask if the pt went to [place] on hospice care if marry could work on seeing if she would get authorization for 24 hr care and is the y could arrange 24hr care then the pt could return home on hospice care-  cm did reach out to [facility] and she stated the pt could start at [place] on hospice ant then return home on hospice if caregivers are in place.-

## 2025-02-18 NOTE — PLAN OF CARE
Problem: Prexisting or High Potential for Compromised Skin Integrity  Goal: Skin integrity is maintained or improved  Description: INTERVENTIONS:  - Identify patients at risk for skin breakdown  - Assess and monitor skin integrity  - Assess and monitor nutrition and hydration status  - Monitor labs   - Assess for incontinence   - Turn and reposition patient  - Assist with mobility/ambulation  - Relieve pressure over bony prominences  - Avoid friction and shearing  - Provide appropriate hygiene as needed including keeping skin clean and dry  - Evaluate need for skin moisturizer/barrier cream  - Collaborate with interdisciplinary team   - Patient/family teaching  - Consider wound care consult   Outcome: Progressing     Problem: PAIN - ADULT  Goal: Verbalizes/displays adequate comfort level or baseline comfort level  Description: Interventions:  - Encourage patient to monitor pain and request assistance  - Assess pain using appropriate pain scale  - Administer analgesics based on type and severity of pain and evaluate response  - Implement non-pharmacological measures as appropriate and evaluate response  - Consider cultural and social influences on pain and pain management  - Notify physician/advanced practitioner if interventions unsuccessful or patient reports new pain  Outcome: Progressing     Problem: INFECTION - ADULT  Goal: Absence or prevention of progression during hospitalization  Description: INTERVENTIONS:  - Assess and monitor for signs and symptoms of infection  - Monitor lab/diagnostic results  - Monitor all insertion sites, i.e. indwelling lines, tubes, and drains  - Monitor endotracheal if appropriate and nasal secretions for changes in amount and color  - [place] appropriate cooling/warming therapies per order  - Administer medications as ordered  - Instruct and encourage patient and family to use good hand hygiene technique  - Identify and instruct in appropriate isolation precautions for  identified infection/condition  Outcome: Progressing  Goal: Absence of fever/infection during neutropenic period  Description: INTERVENTIONS:  - Monitor WBC    Outcome: Progressing     Problem: SAFETY ADULT  Goal: Patient will remain free of falls  Description: INTERVENTIONS:  - Educate patient/family on patient safety including physical limitations  - Instruct patient to call for assistance with activity   - Consult OT/PT to assist with strengthening/mobility   - Keep Call bell within reach  - Keep bed low and locked with side rails adjusted as appropriate  - Keep care items and personal belongings within reach  - Initiate and maintain comfort rounds  - Make Fall Risk Sign visible to staff  - Offer Toileting every 2 Hours, in advance of need  - Initiate/Maintain bed alarm  - Obtain necessary fall risk management equipment: yellow socks  - Apply yellow socks and bracelet for high fall risk patients  - Consider moving patient to room near nurses station  Outcome: Progressing  Goal: Maintain or return to baseline ADL function  Description: INTERVENTIONS:  -  Assess patient's ability to carry out ADLs; assess patient's baseline for ADL function and identify physical deficits which impact ability to perform ADLs (bathing, care of mouth/teeth, toileting, grooming, dressing, etc.)  - Assess/evaluate cause of self-care deficits   - Assess range of motion  - Assess patient's mobility; develop plan if impaired  - Assess patient's need for assistive devices and provide as appropriate  - Encourage maximum independence but intervene and supervise when necessary  - Involve family in performance of ADLs  - Assess for home care needs following discharge   - Consider OT consult to assist with ADL evaluation and planning for discharge  - Provide patient education as appropriate  Outcome: Progressing  Goal: Maintains/Returns to pre admission functional level  Description: INTERVENTIONS:  - Perform AM-PAC 6 Click Basic Mobility/ Daily  Activity assessment daily.  - Set and communicate daily mobility goal to care team and patient/family/caregiver.   - Collaborate with rehabilitation services on mobility goals if consulted  - Perform Range of Motion 3 times a day.  - Reposition patient every 2 hours.  - Dangle patient 3 times a day  - Stand patient 3 times a day  - Ambulate patient 3 times a day  - Out of bed to chair 3 times a day   - Out of bed for meals 3 times a day  - Out of bed for toileting  - Record patient progress and toleration of activity level   Outcome: Progressing     Problem: DISCHARGE PLANNING  Goal: Discharge to home or other facility with appropriate resources  Description: INTERVENTIONS:  - Identify barriers to discharge w/patient and caregiver  - Arrange for needed discharge resources and transportation as appropriate  - Identify discharge learning needs (meds, wound care, etc.)  - Arrange for interpretive services to assist at discharge as needed  - Refer to Case Management Department for coordinating discharge planning if the patient needs post-hospital services based on physician/advanced practitioner order or complex needs related to functional status, cognitive ability, or social support system  Outcome: Progressing     Problem: Knowledge Deficit  Goal: Patient/family/caregiver demonstrates understanding of disease process, treatment plan, medications, and discharge instructions  Description: Complete learning assessment and assess knowledge base.  Interventions:  - Provide teaching at level of understanding  - Provide teaching via preferred learning methods  Outcome: Progressing     Problem: GASTROINTESTINAL - ADULT  Goal: Minimal or absence of nausea and/or vomiting  Description: INTERVENTIONS:  - Administer IV fluids if ordered to ensure adequate hydration  - Maintain NPO status until nausea and vomiting are resolved  - Nasogastric tube if ordered  - Administer ordered antiemetic medications as needed  - Provide  nonpharmacologic comfort measures as appropriate  - Advance diet as tolerated, if ordered  - Consider nutrition services referral to assist patient with adequate nutrition and appropriate food choices  Outcome: Progressing  Goal: Maintains or returns to baseline bowel function  Description: INTERVENTIONS:  - Assess bowel function  - Encourage oral fluids to ensure adequate hydration  - Administer IV fluids if ordered to ensure adequate hydration  - Administer ordered medications as needed  - Encourage mobilization and activity  - Consider nutritional services referral to assist patient with adequate nutrition and appropriate food choices  Outcome: Progressing  Goal: Maintains adequate nutritional intake  Description: INTERVENTIONS:  - Monitor percentage of each meal consumed  - Identify factors contributing to decreased intake, treat as appropriate  - Assist with meals as needed  - Monitor I&O, weight, and lab values if indicated  - Obtain nutrition services referral as needed  Outcome: Progressing  Goal: Establish and maintain optimal ostomy function  Description: INTERVENTIONS:  - Assess bowel function  - Encourage oral fluids to ensure adequate hydration  - Administer IV fluids if ordered to ensure adequate hydration   - Administer ordered medications as needed  - Encourage mobilization and activity  - Nutrition services referral to assist patient with appropriate food choices  - Assess stoma site  - Consider wound care consult   Outcome: Progressing  Goal: Oral mucous membranes remain intact  Description: INTERVENTIONS  - Assess oral mucosa and hygiene practices  - Implement preventative oral hygiene regimen  - Implement oral medicated treatments as ordered  - Initiate Nutrition services referral as needed  Outcome: Progressing     Problem: Nutrition/Hydration-ADULT  Goal: Nutrient/Hydration intake appropriate for improving, restoring or maintaining nutritional needs  Description: Monitor and assess patient's  nutrition/hydration status for malnutrition. Collaborate with interdisciplinary team and initiate plan and interventions as ordered.  Monitor patient's weight and dietary intake as ordered or per policy. Utilize nutrition screening tool and intervene as necessary. Determine patient's food preferences and provide high-protein, high-caloric foods as appropriate.     INTERVENTIONS:  - Monitor oral intake, urinary output, labs, and treatment plans  - Assess nutrition and hydration status and recommend course of action  - Evaluate amount of meals eaten  - Assist patient with eating if necessary   - Allow adequate time for meals  - Recommend/ encourage appropriate diets, oral nutritional supplements, and vitamin/mineral supplements  - Order, calculate, and assess calorie counts as needed  - Recommend, monitor, and adjust tube feedings and TPN/PPN based on assessed needs  - Assess need for intravenous fluids  - Provide specific nutrition/hydration education as appropriate  - Include patient/family/caregiver in decisions related to nutrition  Outcome: Progressing     Problem: Decreased Cardiac Output  Goal: Cardiac output adequate for individual needs  Description: INTERVENTIONS: Monitor for signs and symptoms of decreased cardiac output   - Monitor for dyspnea with exertion and at rest  - Monitor for orthopnea  - Monitor for signs of tachycardia. Place patient on telemetry monitoring.  - Assess patient for jugular vein distention  - Assess patient for lower extremity edema and poor peripheral perfusion   - Auscultate lung sound for Fine bibasilar crackles   - Monitor for cardiac arrythmias   - Administer beta blockers, antiarrhythmic, and blood pressure medications as ordered    Outcome: Progressing     Problem: Excess Fluid Volume  Goal: Patient is able to achieve and maintain homeostasis  Description: INTERVENTIONS: Monitor for sign and symptoms of fluid overload  - Evaluate LE edema every shift  - Elevate LE to prevent  dependent edema  - Apply ALONDRA stockings as ordered   - Monitor ankle circumference daily  - Assess for jugular vein distention  - Evaluate provider orders for the CHF diuretic algorithm. Administer diuretics as ordered  - Weigh the patient daily at 0600 and report a weight gain of five pounds or more   - Strict intake and output  - Monitor fluid intake and adhere to fluid restrictions  - Assess lung sounds every shift and as needed  - Monitor vital signs and lab values (CBC, chem, BUN, BNP)  - Measure and document urine output    Outcome: Progressing     Problem: Impaired Gas Exchange  Goal: Optimize oxygenation and ensure adequate ventilation  Description: INTERVENTIONS: Monitor for signs and symptoms of respiratory distress                - Elevate HOB or use high fowlers to promote lung expansion                - Administer oxygen as ordered to maintain adequate oxygenation                - Encourage use of IS to promote lung expansion and prevent PN                - Monitor ABGs to assess oxygenation status                - Monitor blood oxygen level to maintain adequate oxygenation                - Encourage cough and deep breathing exercises to promote lung expansion                - Monitor patient's mental status for increased confusion    Outcome: Progressing     Problem: Knowledge Deficit  Goal: Patient is able to verbalize understanding of Heart Failure after education  Description: INTERVENTIONS:  - Educate the patient and family on signs and symptoms of HF  - Provide the patient with HF education and HF zone tool  - Educate on the importance of daily weight in the AM and reporting a weight gain               of 3 or more pounds to their primary care physician  - Monitor for SOB  - Maintain and sodium and fluid restriction  - Educate the patient on the importance of medications such as: diuretics, betablockers,               antiarrhythmics and their purpose, dose, route, side effects and labs                if they are needed    Outcome: Progressing     Problem: Potential for Falls  Goal: Patient will remain free of falls  Description: INTERVENTIONS:  - Educate patient/family on patient safety including physical limitations  - Instruct patient to call for assistance with activity   - Consult OT/PT to assist with strengthening/mobility   - Keep Call bell within reach  - Keep bed low and locked with side rails adjusted as appropriate  - Keep care items and personal belongings within reach  - Initiate and maintain comfort rounds  - Make Fall Risk Sign visible to staff  - Offer Toileting every 2 Hours, in advance of need  - Initiate/Maintain bed alarm  - Obtain necessary fall risk management equipment: yellow socks  - Apply yellow socks and bracelet for high fall risk patients  - Consider moving patient to room near nurses station  Outcome: Progressing

## 2025-02-18 NOTE — PLAN OF CARE
Problem: Prexisting or High Potential for Compromised Skin Integrity  Goal: Skin integrity is maintained or improved  Description: INTERVENTIONS:  - Identify patients at risk for skin breakdown  - Assess and monitor skin integrity  - Assess and monitor nutrition and hydration status  - Monitor labs   - Assess for incontinence   - Turn and reposition patient  - Assist with mobility/ambulation  - Relieve pressure over bony prominences  - Avoid friction and shearing  - Provide appropriate hygiene as needed including keeping skin clean and dry  - Evaluate need for skin moisturizer/barrier cream  - Collaborate with interdisciplinary team   - Patient/family teaching  - Consider wound care consult   Outcome: Progressing     Problem: PAIN - ADULT  Goal: Verbalizes/displays adequate comfort level or baseline comfort level  Description: Interventions:  - Encourage patient to monitor pain and request assistance  - Assess pain using appropriate pain scale  - Administer analgesics based on type and severity of pain and evaluate response  - Implement non-pharmacological measures as appropriate and evaluate response  - Consider cultural and social influences on pain and pain management  - Notify physician/advanced practitioner if interventions unsuccessful or patient reports new pain  Outcome: Progressing     Problem: INFECTION - ADULT  Goal: Absence or prevention of progression during hospitalization  Description: INTERVENTIONS:  - Assess and monitor for signs and symptoms of infection  - Monitor lab/diagnostic results  - Monitor all insertion sites, i.e. indwelling lines, tubes, and drains  - Monitor endotracheal if appropriate and nasal secretions for changes in amount and color  - [place] appropriate cooling/warming therapies per order  - Administer medications as ordered  - Instruct and encourage patient and family to use good hand hygiene technique  - Identify and instruct in appropriate isolation precautions for  identified infection/condition  Outcome: Progressing     Problem: SAFETY ADULT  Goal: Maintain or return to baseline ADL function  Description: INTERVENTIONS:  -  Assess patient's ability to carry out ADLs; assess patient's baseline for ADL function and identify physical deficits which impact ability to perform ADLs (bathing, care of mouth/teeth, toileting, grooming, dressing, etc.)  - Assess/evaluate cause of self-care deficits   - Assess range of motion  - Assess patient's mobility; develop plan if impaired  - Assess patient's need for assistive devices and provide as appropriate  - Encourage maximum independence but intervene and supervise when necessary  - Involve family in performance of ADLs  - Assess for home care needs following discharge   - Consider OT consult to assist with ADL evaluation and planning for discharge  - Provide patient education as appropriate  Outcome: Progressing  Goal: Maintains/Returns to pre admission functional level  Description: INTERVENTIONS:  - Perform AM-PAC 6 Click Basic Mobility/ Daily Activity assessment daily.  - Set and communicate daily mobility goal to care team and patient/family/caregiver.   - Collaborate with rehabilitation services on mobility goals if consulted  - Perform Range of Motion 3 times a day.  - Reposition patient every 2 hours.  - Record patient progress and toleration of activity level   Outcome: Progressing     Problem: DISCHARGE PLANNING  Goal: Discharge to home or other facility with appropriate resources  Description: INTERVENTIONS:  - Identify barriers to discharge w/patient and caregiver  - Arrange for needed discharge resources and transportation as appropriate  - Identify discharge learning needs (meds, wound care, etc.)  - Arrange for interpretive services to assist at discharge as needed  - Refer to Case Management Department for coordinating discharge planning if the patient needs post-hospital services based on physician/advanced practitioner  order or complex needs related to functional status, cognitive ability, or social support system  Outcome: Progressing     Problem: Knowledge Deficit  Goal: Patient/family/caregiver demonstrates understanding of disease process, treatment plan, medications, and discharge instructions  Description: Complete learning assessment and assess knowledge base.  Interventions:  - Provide teaching at level of understanding  - Provide teaching via preferred learning methods  Outcome: Progressing     Problem: GASTROINTESTINAL - ADULT  Goal: Maintains or returns to baseline bowel function  Description: INTERVENTIONS:  - Assess bowel function  - Encourage oral fluids to ensure adequate hydration  - Administer IV fluids if ordered to ensure adequate hydration  - Administer ordered medications as needed  - Encourage mobilization and activity  - Consider nutritional services referral to assist patient with adequate nutrition and appropriate food choices  Outcome: Progressing     Problem: COPING  Goal: Pt/Family able to verbalize concerns and demonstrate effective coping strategies  Description: INTERVENTIONS:  - Assist patient/family to identify coping skills, available support systems and cultural and spiritual values  - Provide emotional support, including active listening and acknowledgement of concerns of patient and caregivers  - Reduce environmental stimuli, as able  - Provide patient education  - Assess for spiritual pain/suffering and initiate spiritual care, including notification of Pastoral Care or daryl based community as needed  - Assess effectiveness of coping strategies  Outcome: Progressing

## 2025-02-18 NOTE — ASSESSMENT & PLAN NOTE
Patient having acute mental status changes 1/31/2025  UA positive-see plan for acute cystitis without hematuria  Transferred to [facility] 1/31/2025 for stat head CT-negative for acute intracranial abnormality  Per family, patient had confusion with Oxy in the past, this was discontinued  Continues with waxing and waning mentation  Transitioned to comfort measures only 2/6/2025

## 2025-02-18 NOTE — PROGRESS NOTES
"Progress Note - Hospitalist   Name: Zee Kirk 74 y.o. female I MRN: [id number]  Unit/Bed#: [facility] I Date of Admission: 1/26/2025   Date of Service: 2/18/2025 I Hospital Day: 22    Assessment & Plan  Comfort measures only status  See also, plan for goals of care  Patient has transitioned to comfort measures only  Oral medications discontinued, no further labs, testing, or treatments  Comfort medications ordered  Referrals have been made to SNF facilities with hospice services.  Will transfer to facility when arrangements finalized by case management  Panniculitis  POA with redness and drainage of bilateral pan eyes similar to admissions  CT imaging with no obvious abscess  Patient was not candidate for panniculectomy in the past  S/p cefepime and vancomycin IV-now off of antibiotics  Infectious disease input appreciated  Transitioned to comfort measures only 2/6/2025  Chronic heart failure with preserved ejection fraction (HCC)  Wt Readings from Last 3 Encounters:   02/06/25 (!) 208 kg (458 lb 1.9 oz)   01/17/25 (!) 205 kg (451 lb)   01/01/25 (!) 205 kg (451 lb 14.4 oz)     11/7/2024 TTE: LVEF 60% with normal systolic function  Outpatient regimen included furosemide 60 mg twice daily   Nonadherent with outpatient Lasix due to hypotension  Nephrology input appreciated   Lasix discontinued   Transitioned to comfort measures only 2/6/2025  Type 2 diabetes mellitus without complication, without long-term current use of insulin (HCC)  Lab Results   Component Value Date    HGBA1C 7.2 (H) 11/06/2024       No results for input(s): \"POCGLU\" in the last 72 hours.        Blood Sugar Average: Last 72 hrs:    Patient denies any history of diabetes  Transitioned to full comfort measures 2/6/2025, allow pleasure feed  No further labs, testing, or treatments  Longstanding persistent atrial fibrillation (HCC)  Heart rate remains 40s to 50s  Was receiving Coumadin for anticoagulation-discontinued 2/6/2025 after " discussion with the family  Cardiology input appreciated  Transitioned to comfort measures only as of 2/6/2025  No further labs, testing, or treatments  Class 3 severe obesity due to excess calories with serious comorbidity and body mass index (BMI) greater than or equal to 70 in adult (Formerly [facility])  BMI 81.15  Goals of care, counseling/discussion  Goals of care discussion initiated  Patient has developed encephalopathy and bradycardia  Hospice evaluation appreciated   Case management making referrals for SNF on hospice.  Search has been expanded  Patient has transitioned to comfort measures only 2/6/2025  Hypothyroid  Levothyroxine discontinued  History of Clostridioides difficile infection  S/p oral vancomycin, discontinued   Change in mental status  Patient having acute mental status changes 1/31/2025  UA positive-see plan for acute cystitis without hematuria  [facility] 1/31/2025 for stat head CT-negative for acute intracranial abnormality  Per family, patient had confusion with Oxy in the past, this was discontinued  Continues with waxing and waning mentation  Transitioned to comfort measures only 2/6/2025  Acute cystitis without hematuria  UA positive  Patient with numerous antibiotic allergies  S/P treatment with cefepime and vancomycin  Infectious disease input appreciated   Transitioned to comfort measures only 2/6/2025   Metabolic encephalopathy  Due to acute cystitis  CT head negative  Transitioned to comfort measures only 2/6/2025  LARRY (acute kidney injury) ([facility])  Patient on Lasix at home and was requiring blood pressure support with midodrine so Lasix could be given   Appreciate nephrology who followed  Lasix discontinued on 2/6 when transition to comfort measures  Has transitioned to comfort measures only 2/6/2025  Hypotension  Previously on midodrine, discontinued   Bradycardia  Noted to have bradycardia with heart rate in the 40s   Cardiology input appreciated   Please see also, goals of  care discussion   Transitioned to comfort measures only 2/6/2025     VTE Pharmacologic Prophylaxis: VTE Score: 8  CMO    Mobility:   Basic Mobility Inpatient Raw Score: 7  JH-HLM Goal: 2: Bed activities/Dependent transfer  JH-HLM Achieved: 2: Bed activities/Dependent transfer  JH-HLM Goal achieved. Continue to encourage appropriate mobility.    Patient Centered Rounds: I performed bedside rounds with nursing staff today.   Discussions with Specialists or Other Care Team Provider: Case management    Education and Discussions with Family / Patient: Attempted to update  (nephew) via phone. Left voicemail.     Current Length of Stay: 22 day(s)  Current Patient Status: Inpatient   Certification Statement: The patient will continue to require additional inpatient hospital stay due to care coordination.  Discharge Plan: Anticipate discharge in 24-48 hrs to SNF.    Code Status: Level 4 - Comfort Care    Subjective   Evaluated at bedside.  Verbalizes comfort.  She says her appetite varies.     Objective :  Temp:  [98.4 °F (36.9 °C)] 98.4 °F (36.9 °C)  HR:  [65] 65  BP: (153)/(73) 153/73  Resp:  [18] 18  SpO2:  [96 %-97 %] 96 %  O2 Device: Nasal cannula  Nasal Cannula O2 Flow Rate (L/min):  [2 L/min] 2 L/min    Body mass index is 81.15 kg/m².     Input and Output Summary (last 24 hours):     Intake/Output Summary (Last 24 hours) at 2/18/2025 1329  Last data filed at 2/18/2025 0900  Gross per 24 hour   Intake 220 ml   Output --   Net 220 ml       Physical Exam  Vitals and nursing note reviewed.   Constitutional:       General: She is not in acute distress.     Appearance: She is obese.   HENT:      Head: Normocephalic and atraumatic.      Nose: Nose normal.      Mouth/Throat:      Mouth: Mucous membranes are moist.      Pharynx: Oropharynx is clear.   Eyes:      Pupils: Pupils are equal, round, and reactive to light.   Cardiovascular:      Rate and Rhythm: Normal rate.      Pulses: Normal pulses.   Pulmonary:       Effort: Pulmonary effort is normal. No respiratory distress.   Abdominal:      General: Bowel sounds are normal.      Palpations: Abdomen is soft.      Tenderness: There is no abdominal tenderness.   Musculoskeletal:      Cervical back: Neck supple.      Right lower leg: No edema.      Left lower leg: No edema.   Skin:     General: Skin is warm and dry.      Capillary Refill: Capillary refill takes less than 2 seconds.   Neurological:      General: No focal deficit present.      Mental Status: She is alert and oriented to person, place, and time. Mental status is at baseline.           Lines/Drains:              Lab Results: I have reviewed the following results:                             Recent Cultures (last 7 days):               Last 24 Hours Medication List:     Current Facility-Administered Medications:     acetaminophen (TYLENOL) tablet 650 mg, Q6H PRN    diphenhydrAMINE (BENADRYL) injection 50 mg, Q6H PRN    LORazepam (ATIVAN) tablet 1 mg, Q3H PRN    morphine injection 2 mg, Q3H PRN    Administrative Statements   Today, Patient Was Seen By: ORACIO Gardner      **Please Note: This note may have been constructed using a voice recognition system.**

## 2025-02-19 PROCEDURE — 99231 SBSQ HOSP IP/OBS SF/LOW 25: CPT

## 2025-02-19 RX ADMIN — ACETAMINOPHEN 650 MG: 325 TABLET ORAL at 00:24

## 2025-02-19 NOTE — ASSESSMENT & PLAN NOTE
[facility] [place] [facility]   Dr. Dorian Hawkins  PAIN MANAGEMENT     [address]  [address]  Ph:  [phone]  Fax:  [phone]    PATIENT INSTRUCTIONS FOR PROCEDURES   Facet Injections, Medial Branch Injections, Epidural Injections, Sacroiliac Joint Injections, Bursa Injection    1. Do not take herbal medications for 7 days prior to the procedure (e.g. Gela, Ecotrin, Empirin, Excedrin, Equagesic, synalogos-DC, BC powder, Gingko, Garlic, Fish Oil).  81mg aspirin may need to be stopped, but ask Dr. Hawkins or Primary Care Provider before doing so.  2. If you take PRADAXA or PLAVIX you must stop the medication 7 days prior to the injection.  Coumadin/Warfarin should be stopped 5 days prior to the procedure. If you are on LOVENOX, do not have an injection of Lovenox 24 hours before a procedure.  If you are on other blood thinners, ask Dr. Hawkins or his staff when they should be stopped.   PLEASE discontinue these medications ONLY if you have permission from your primary care provider/physician.   However, if you are having a Sacroiliac Joint Injection or Bursa Injection, this may not apply.   Please confirm with Dr. Hawkins.  3. Do not take any anti-inflammatory medications 7 days before the procedure (Ibuprofen, Advil, Meloxicam, Motrin, etc.)  4. Take all other medications per your normal medication routine. If you are diabetic and take insulin please review with nurse for dosing instructions.  5. Meals - A light meal is acceptable anytime before the procedure, unless sedation is being used.  If sedation is being used, please do not have anything to eat or drink for 8 hours.  If you do, then the procedure may need to rescheduled.  This includes no gum or hard candy.  6. PLEASE BE ON TIME. You should plan to arrive 30 minutes prior to your scheduled appointment.  Please be here 1 hour prior to procedure if you have been told you will be receiving sedation.   7. Please HAVE A RESPONSIBLE ADULT TO DRIVE YOU  POA with redness and drainage of bilateral pan eyes similar to admissions  CT imaging with no obvious abscess  Patient was not candidate for panniculectomy in the past  S/p cefepime and vancomycin IV-now off of antibiotics  Infectious disease input appreciated  Transitioned to comfort measures only 2/6/2025   HOME.  IF YOU ARE USING A TRANSPORTATION SERVICE YOU WILL NEED AN ADULT TO ACCOMPANY YOU HOME.  8. You will be asked to sign a consent form on the day of the procedure.  9. Please advise the physician if you have any drug allergies, especially dye or anesthetics, or if you take blood thinning medications or have a cardiac device (Pacemaker, Defibrillator)  10. The procedure generally ranges from 15 to 30 minutes. Additional recovery time is usually 10 to 30 minutes.  Dr. Hawkins will carefully walk you through the procedure.  11. We strive to stay on schedule for all patients having procedures.  As such on procedure days, please limit questions/concerns to only the procedure that is being done.  All medication refill questions and other questions should be addressed over the phone or on a different visit.    12. For Spinal Cord Stimulator Trial Patients, Implant Patients, and patients having a discogram.  Sedation will likely be used.   As such please do not have anything to eat or drink 8 hours before. This includes no gum or hard candy please. However, you may take some of your normal medications with a sip of water (ie, blood pressure medications)   Also, the night before the procedure please use a soap that is provided HIBICLENS.  Please take a shower with this, the night before the procedure and the morning of the procedure.  Only use this if you are not allergic to it. Only apply this to the areas that are going to be operated on and keep it on for at least 15min.  Do not contact eyes or groin region with this soap.   Also make certain that if you have hair in the lower back region (or where the procedure will be done) that you shave it the night before the procedure.  All of this helps reduce the chance of any infections, as such is very important.    13. For patients having Radiofreqency Ablation.  Sedation will likely be used.   As such please do not have anything to eat or drink 8 hours before, again this  includes no gum or hard candy please.   However, you may take some of your normal medications with a sip of water (ie, blood pressure medications)   Also make certain that if you have hair in the lower back/neck region (or where the procedure will be done) that you shave it the night before the procedure. All of this helps reduce the chance of any infections, as such is very important.    14. Please call our office if you have a cough with drainage, runny nose with drainage, temperature that is elevated or are currently taking antibiotics. There may be a chance we may not be able to do the procedure if you have any of these.     15. DISCOGRAM PATIENTS-all sedation instructions above apply.  In addition, you will need your  to take you to the hospital for a CT scan immediately after the procedure.    DURING THE PROCEDURE          The injection takes just a few minutes. But extra time is needed to get ready.   · Monitoring devices may be attached to your chest or side. These devices measure your heart rate, breathing, and blood pressure.  · You lie on your stomach or side, depending on where the injection will be given. Your back is cleaned and may be covered with sterile towels.  · Medicine is given to numb the skin near the injection site.  · If fluoroscopy (x-ray imaging) is to be used, a contrast “dye” may be injected into your back. This helps get a better image.  A local anesthetic (for numbing) or steroids (for reducing inflammation), or both are injected sometimes.    RISKS OF PROCEDURE/SURGERY  As with most medical procedures, there are risks associated with procedures, which are usually less than 1% of the time.  These risks include: infection, bleeding, nerve damage,procedure not working,increased pain, paralysis, meningitis, death, allergic reactions, need for surgery, unknown risks, and possibly requiring further injections and/or surgery.  Every reasonable effort will be made to minimize these  risks.    AFTER PROCEDURE   1. You may resume taking your normal medications once the procedure is complete. Typically if you are taking blood thinners, wait at least 12-24 hours before restarting them.  Ask Dr. Hawkins if you have a question about this.  2. You will likely have a bandage over your injection site.  Apply ice to the area of the injection for 20 minutes at a time, 4 times daily, during the first 24 hours after the procedure as needed for any discomfort and to reduce local muscle soreness.  3. Drink plenty of fluids.  4. Avoid strenuous exercise for 1 to 2 days, even if you are feeling great.  Physical therapy should be avoided for at least 48 hours after an injection.  Let your therapist know.   5. It is normal to have some soreness at the site.  This is likely because the numbing agent is wearing off.  Just continue to ice the area, try to avoid heat.  6. You may experience low back or neck discomfort for up to seven days. In              addition, you may have increased leg pain or neck pain after the injection.             This is normal as the steroids take about 7-10 days to work sometimes.      7.  For SPINAL CORD STIMULATOR TRIAL PATIENTS or STIMULATOR IMPLANT Patients.  If you have staples after a surgery, please do not get this wet.  You should only sponge bathe with a Tegaderm and Gauze pad over the wound sites, but ONLY after 3 days after the surgery. Also do not forget to take your antibiotics.   Depending on how your wounds are healing, staples maybe kept in place for 2 weeks.  It is important to keep the wound area clean and dry.  If the hospital did not give you Tegaderm and Gauze, please make sure to ask for them or purchase them from a pharmacy.   Also it is very important.  For up to a month, no excessive twisting, turning, lifting above your head or strenuous activity.   If you do this, your stimulator may move.  Please note, avoid sexual activity for the duration of the stimulator  trial and for up to 3 weeks after the stimulator implant.    Below are reasons to call our office after a procedure.  If the office is closed or an answer is not received and you feel one of these issues is occurring, please call 911 and/or go to your nearest Emergency Room.  • If you have increased pain that is intolerable in nature  • Fevers above 100.4 degrees Fahrenheit  • If you have bowel or bladder problems (you are defecating or urinating on yourself without control) and this is new in nature  • If you have complete numbness in the groin region and/or if you cannot move a leg or an arm    ++++++    Procedure Appointment Locations    ++++++    Please verify the location of your procedure.  Dr. Hawkins is at the [facility] next to [facility] and [employer]; we really want you to be at the right place at the right time.  So please take some time now to verify the location of your next appointment.      [address]  [address]  Ph:  [phone]  Fax:  [phone]    We look forward to help you live well!  You may receive a survey after this visit.  We always strive to provide the best care possible and hope that we can always be rated highly.  If you had any problems, please  provide that feedback directly over the phone to our nurse Clara, this way it can be addressed promptly to help us continually improve.  Thank you for your time.    Sincerely,  Dr. Hawkins, Margo WHEELER, and Staff

## 2025-02-19 NOTE — PROGRESS NOTES
"Progress Note - Hospitalist   Name: Zee Kirk 74 y.o. female I MRN: [id number]  Unit/Bed#: [facility] 210[facility] I Date of Admission: 1/26/2025   Date of Service: 2/19/2025 I Hospital Day: 23    Assessment & Plan  Comfort measures only status  See also, plan for goals of care  Patient has transitioned to comfort measures only  Oral medications discontinued, no further labs, testing, or treatments  Comfort medications ordered  Referrals have been made to SNF facilities with hospice services.  Will transfer to facility when arrangements finalized by case management  Panniculitis  POA with redness and drainage of bilateral pan eyes similar to admissions  CT imaging with no obvious abscess  Patient was not candidate for panniculectomy in the past  S/p cefepime and vancomycin IV-now off of antibiotics  Infectious disease input appreciated  Transitioned to comfort measures only 2/6/2025  Chronic heart failure with preserved ejection fraction (HCC)  Wt Readings from Last 3 Encounters:   02/06/25 (!) 208 kg (458 lb 1.9 oz)   01/17/25 (!) 205 kg (451 lb)   01/01/25 (!) 205 kg (451 lb 14.4 oz)     11/7/2024 TTE: LVEF 60% with normal systolic function  Outpatient regimen included furosemide 60 mg twice daily   Nonadherent with outpatient Lasix due to hypotension  Nephrology input appreciated   Lasix discontinued   Transitioned to comfort measures only 2/6/2025  Type 2 diabetes mellitus without complication, without long-term current use of insulin (HCC)  Lab Results   Component Value Date    HGBA1C 7.2 (H) 11/06/2024       No results for input(s): \"POCGLU\" in the last 72 hours.        Blood Sugar Average: Last 72 hrs:    Patient denies any history of diabetes  Transitioned to full comfort measures 2/6/2025, allow pleasure feed  No further labs, testing, or treatments  Longstanding persistent atrial fibrillation (HCC)  Heart rate remains 40s to 50s  Was receiving Coumadin for anticoagulation-discontinued 2/6/2025 after " discussion with the family  Cardiology input appreciated  Transitioned to comfort measures only as of 2/6/2025  No further labs, testing, or treatments  Class 3 severe obesity due to excess calories with serious comorbidity and body mass index (BMI) greater than or equal to 70 in adult ([facility])  BMI 81.15  Goals of care, counseling/discussion  Goals of care discussion initiated  Patient has developed encephalopathy and bradycardia  Hospice evaluation appreciated   Case management making referrals for SNF on hospice.  Search has been expanded  Patient has transitioned to comfort measures only 2/6/2025  Hypothyroid  Levothyroxine discontinued  History of Clostridioides difficile infection  S/p oral vancomycin, discontinued   Change in mental status  Patient having acute mental status changes 1/31/2025  UA positive-see plan for acute cystitis without hematuria  Transferred to [facility] 1/31/2025 for stat head CT-negative for acute intracranial abnormality  Per family, patient had confusion with Oxy in the past, this was discontinued  Continues with waxing and waning mentation  Transitioned to comfort measures only 2/6/2025  Acute cystitis without hematuria  UA positive  Patient with numerous antibiotic allergies  S/P treatment with cefepime and vancomycin  Infectious disease input appreciated   Transitioned to comfort measures only 2/6/2025   Metabolic encephalopathy  Due to acute cystitis  CT head negative  Transitioned to comfort measures only 2/6/2025  LARRY (acute kidney injury) ([facility])  Patient on Lasix at home and was requiring blood pressure support with midodrine so Lasix could be given   Appreciate nephrology who followed  Lasix discontinued on 2/6 when transition to comfort measures  Has transitioned to comfort measures only 2/6/2025  Hypotension  Previously on midodrine, discontinued   Bradycardia  Noted to have bradycardia with heart rate in the 40s   Cardiology input appreciated   Please see also, goals of  care discussion   Transitioned to comfort measures only 2/6/2025     VTE Pharmacologic Prophylaxis: VTE Score: 8 Moderate Risk (Score 3-4) - Pharmacological DVT Prophylaxis Contraindicated. Sequential Compression Devices Ordered.    Mobility:   Basic Mobility Inpatient Raw Score: 7  JH-HLM Goal: 2: Bed activities/Dependent transfer  JH-HLM Achieved: 2: Bed activities/Dependent transfer  JH-HLM Goal achieved. Continue to encourage appropriate mobility.    Patient Centered Rounds: I performed bedside rounds with nursing staff today.   Discussions with Specialists or Other Care Team Provider:   Nursing, case management  Current Length of Stay: 23 day(s)  Current Patient Status: Inpatient   Certification Statement: The patient will continue to require additional inpatient hospital stay due to coordination of care  Discharge Plan:  Transitioned to full comfort as of 2/6.  Referrals have been made to SNF with transition to hospice by case management.  Patient was accepted to [facility] hospice awaiting authorization.  Will discharge when arrangements finalized.    Code Status: Level 4 - Comfort Care    Subjective   Resting comfortably in bed.  Verbalizing needs.    Objective :  HR:  [62] 62  Resp:  [18] 18  SpO2:  [95 %-98 %] 95 %  O2 Device: None (Room air)  Nasal Cannula O2 Flow Rate (L/min):  [2 L/min] 2 L/min    Body mass index is 81.15 kg/m².     Input and Output Summary (last 24 hours):     Intake/Output Summary (Last 24 hours) at 2/19/2025 1602  Last data filed at 2/19/2025 0900  Gross per 24 hour   Intake 300 ml   Output 100 ml   Net 200 ml       Physical Exam  Vitals reviewed.   Constitutional:       General: She is not in acute distress.     Appearance: She is well-developed.   HENT:      Head: Normocephalic and atraumatic.   Cardiovascular:      Rate and Rhythm: Normal rate. Rhythm irregular.      Heart sounds: No murmur heard.  Pulmonary:      Effort: Pulmonary effort is normal. No respiratory distress.       Breath sounds: Normal breath sounds. No wheezing or rales.   Abdominal:      General: There is no distension.      Palpations: Abdomen is soft.      Tenderness: There is no abdominal tenderness. There is no guarding.   Musculoskeletal:         General: No swelling.   Skin:     General: Skin is warm and dry.      Capillary Refill: Capillary refill takes less than 2 seconds.   Neurological:      General: No focal deficit present.      Mental Status: She is alert and oriented to person, place, and time. Mental status is at baseline.   Psychiatric:         Mood and Affect: Mood normal.         Behavior: Behavior normal.           Lines/Drains:              Lab Results: I have reviewed the following results:                             Recent Cultures (last 7 days):         Imaging Results Review: I reviewed radiology reports from this admission including: CT abdomen pelvis, CT head.  Other Study Results Review: No additional pertinent studies reviewed.    Last 24 Hours Medication List:     Current Facility-Administered Medications:     acetaminophen (TYLENOL) tablet 650 mg, Q6H PRN    diphenhydrAMINE (BENADRYL) injection 50 mg, Q6H PRN    LORazepam (ATIVAN) tablet 1 mg, Q3H PRN    morphine injection 2 mg, Q3H PRN    ondansetron (ZOFRAN) injection 4 mg, Q6H PRN    Administrative Statements   Today, Patient Was Seen By: ORACIO Last  I have spent a total time of 36 minutes in caring for this patient on the day of the visit/encounter including Counseling / Coordination of care, Documenting in the medical record, Reviewing/placing orders in the medical record (including tests, medications, and/or procedures), Obtaining or reviewing history  , and Communicating with other healthcare professionals .    **Please Note: This note may have been constructed using a voice recognition system.**

## 2025-02-19 NOTE — PLAN OF CARE
Problem: Prexisting or High Potential for Compromised Skin Integrity  Goal: Skin integrity is maintained or improved  Description: INTERVENTIONS:  - Identify patients at risk for skin breakdown  - Assess and monitor skin integrity  - Assess and monitor nutrition and hydration status  - Monitor labs   - Assess for incontinence   - Turn and reposition patient  - Assist with mobility/ambulation  - Relieve pressure over bony prominences  - Avoid friction and shearing  - Provide appropriate hygiene as needed including keeping skin clean and dry  - Evaluate need for skin moisturizer/barrier cream  - Collaborate with interdisciplinary team   - Patient/family teaching  - Consider wound care consult   Outcome: Progressing     Problem: PAIN - ADULT  Goal: Verbalizes/displays adequate comfort level or baseline comfort level  Description: Interventions:  - Encourage patient to monitor pain and request assistance  - Assess pain using appropriate pain scale  - Administer analgesics based on type and severity of pain and evaluate response  - Implement non-pharmacological measures as appropriate and evaluate response  - Consider cultural and social influences on pain and pain management  - Notify physician/advanced practitioner if interventions unsuccessful or patient reports new pain  Outcome: Progressing     Problem: INFECTION - ADULT  Goal: Absence or prevention of progression during hospitalization  Description: INTERVENTIONS:  - Assess and monitor for signs and symptoms of infection  - Monitor lab/diagnostic results  - Monitor all insertion sites, i.e. indwelling lines, tubes, and drains  - Monitor endotracheal if appropriate and nasal secretions for changes in amount and color  - [place] appropriate cooling/warming therapies per order  - Administer medications as ordered  - Instruct and encourage patient and family to use good hand hygiene technique  - Identify and instruct in appropriate isolation precautions for  identified infection/condition  Outcome: Progressing     Problem: SAFETY ADULT  Goal: Maintain or return to baseline ADL function  Description: INTERVENTIONS:  -  Assess patient's ability to carry out ADLs; assess patient's baseline for ADL function and identify physical deficits which impact ability to perform ADLs (bathing, care of mouth/teeth, toileting, grooming, dressing, etc.)  - Assess/evaluate cause of self-care deficits   - Assess range of motion  - Assess patient's mobility; develop plan if impaired  - Assess patient's need for assistive devices and provide as appropriate  - Encourage maximum independence but intervene and supervise when necessary  - Involve family in performance of ADLs  - Assess for home care needs following discharge   - Consider OT consult to assist with ADL evaluation and planning for discharge  - Provide patient education as appropriate  Outcome: Progressing  Goal: Maintains/Returns to pre admission functional level  Description: INTERVENTIONS:  - Perform AM-PAC 6 Click Basic Mobility/ Daily Activity assessment daily.  - Set and communicate daily mobility goal to care team and patient/family/caregiver.   - Collaborate with rehabilitation services on mobility goals if consulted  - Perform Range of Motion 3 times a day.  - Reposition patient every 2 hours.  - Record patient progress and toleration of activity level   Outcome: Progressing     Problem: DISCHARGE PLANNING  Goal: Discharge to home or other facility with appropriate resources  Description: INTERVENTIONS:  - Identify barriers to discharge w/patient and caregiver  - Arrange for needed discharge resources and transportation as appropriate  - Identify discharge learning needs (meds, wound care, etc.)  - Arrange for interpretive services to assist at discharge as needed  - Refer to Case Management Department for coordinating discharge planning if the patient needs post-hospital services based on physician/advanced practitioner  order or complex needs related to functional status, cognitive ability, or social support system  Outcome: Progressing     Problem: Knowledge Deficit  Goal: Patient/family/caregiver demonstrates understanding of disease process, treatment plan, medications, and discharge instructions  Description: Complete learning assessment and assess knowledge base.  Interventions:  - Provide teaching at level of understanding  - Provide teaching via preferred learning methods  Outcome: Progressing     Problem: GASTROINTESTINAL - ADULT  Goal: Maintains or returns to baseline bowel function  Description: INTERVENTIONS:  - Assess bowel function  - Encourage oral fluids to ensure adequate hydration  - Administer IV fluids if ordered to ensure adequate hydration  - Administer ordered medications as needed  - Encourage mobilization and activity  - Consider nutritional services referral to assist patient with adequate nutrition and appropriate food choices  Outcome: Progressing     Problem: COPING  Goal: Pt/Family able to verbalize concerns and demonstrate effective coping strategies  Description: INTERVENTIONS:  - Assist patient/family to identify coping skills, available support systems and cultural and spiritual values  - Provide emotional support, including active listening and acknowledgement of concerns of patient and caregivers  - Reduce environmental stimuli, as able  - Provide patient education  - Assess for spiritual pain/suffering and initiate spiritual care, including notification of Pastoral Care or daryl based community as needed  - Assess effectiveness of coping strategies  Outcome: Progressing

## 2025-02-19 NOTE — CASE MANAGEMENT
Case Management Discharge Planning Note    Patient name Zee Kirk  Location /-01 MRN [id number]  : 1950 Date 2025       Current Admission Date: 2025  Current Admission Diagnosis:Comfort measures only status   Patient Active Problem List    Diagnosis Date Noted Date Diagnosed    Comfort measures only status 2025     Bradycardia 2025     Stage 3a chronic kidney disease ([facility]) 2025     Metabolic encephalopathy 2025     Acute cystitis without hematuria 2025     Change in mental status 2025     Goals of care, counseling/discussion 2025     Bilateral lower extremity edema 2024     Hypotension 2024     Weakness 2024     Paroxysmal A-fib ([facility]) 2024     History of kidney stones 2024     Skin abnormalities 2024     Cataract, nuclear sclerotic senile, right 2024     Chronic heart failure with preserved ejection fraction ([facility]) 2024     Hypoxic episode 2024     Type 2 diabetes mellitus without complication, without long-term current use of insulin ([facility]) 2024     Long term (current) use of anticoagulants 2024     Subtherapeutic international normalized ratio (INR) 2024     Multiple open wounds 2024     Left leg pain 2023     Shingles 2023     Cellulitis- Ruled Out 2023     Longstanding persistent atrial fibrillation ([facility]) 2022     History of Clostridioides difficile infection 2022     Hypothyroid 10/03/2020     Mild episode of recurrent major depressive disorder ([facility]) 10/03/2020     Idiopathic chronic 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 10/03/2020     Class 3 severe obesity due to excess calories with serious comorbidity and body mass index (BMI) greater than or equal to 70 in adult ([facility]) 10/03/2020     Panniculitis 10/03/2020     Gastroesophageal reflux disease without esophagitis 10/03/2020     Hx MRSA infection 2020     Impaired  mobility 02/03/2019     Osteoarthritis of glenohumeral joint 02/03/2019     Left ovarian cyst 09/20/2017     Depression with anxiety 07/15/2017     Anemia 12/28/2016     LARRY (acute kidney injury) (HCC) 12/27/2016     Adjustment disorder 09/05/2013     Irritable bowel syndrome 12/04/2012     Left atrial enlargement 12/04/2012     Left ventricular hypertrophy 12/04/2012     Carpal tunnel syndrome 05/30/2012     Gout 05/30/2012     Hyperlipidemia 05/30/2012     Symptomatic menopausal or female climacteric states 05/30/2012       LOS (days): 23  Geometric Mean LOS (GMLOS) (days): 4.4  Days to GMLOS:-18.4     OBJECTIVE:  Risk of Unplanned Readmission Score: 32.24         Current admission status: Inpatient   Preferred Pharmacy:   [facility]  [place] [address]  Phone: [phone] Fax: [phone]    Primary Care Provider: Franklyn Caruso MD    Primary Insurance: MEDICARE  Secondary Insurance: [facility]    DISCHARGE DETAILS:    Discharge planning discussed with:: Rachelle was called at 10:17 am and placed a call to Teri her  at 11:17 am  [phone]    Freedom of Choice: Yes  Comments - Freedom of Choice: pt is on comfort care- pt was accepted  to [place] - auth is needed for hospice care- cm sent a request for auth via aidin to cm support team  CM contacted family/caregiver?: Yes             Contacts  Patient Contacts: Rachelle [place]  Relationship to Patient:: Family  Contact Method: Phone  Phone Number: [phone]  Reason/Outcome: Discharge Planning    Requested Home Health Care         Is the patient interested in HHC at discharge?: No    DME Referral Provided  Referral made for DME?: No    Other Referral/Resources/Interventions Provided:  Interventions: SNF, Hospice  Referral Comments: pt accepted to [facility] for hospice care with [facility]- cm sent for auth as requested by [facility] for hospice care-- [facility] is able to open the pt tomorrow    Would you like to participate in our [facility] service program?  : No - Declined    Treatment Team Recommendation: SNF ([place]  with [facility] hospice sent for hospice auth - BLS)

## 2025-02-19 NOTE — CASE MANAGEMENT
[facility] received request for authorization from Care Manager.  Authorization request submitted for: SNF for hospice care   Facility Name: [facility]  NPI: [id number]   Facility MD: Carline Echevarria  NPI: [id number]     Authorization initiated by contacting insurance:  [place] [employer] & [employer]   Via: [facility] ([facility]) Portal   Clinicals submitted via Portal attachment   Pending Reference #: [id number]    Care Manager notified: Abby PRATT    Updates to authorization status will be noted in chart. Please reach out to CM for updates on any clinical information.

## 2025-02-19 NOTE — PLAN OF CARE
Problem: Prexisting or High Potential for Compromised Skin Integrity  Goal: Skin integrity is maintained or improved  Description: INTERVENTIONS:  - Identify patients at risk for skin breakdown  - Assess and monitor skin integrity  - Assess and monitor nutrition and hydration status  - Monitor labs   - Assess for incontinence   - Turn and reposition patient  - Assist with mobility/ambulation  - Relieve pressure over bony prominences  - Avoid friction and shearing  - Provide appropriate hygiene as needed including keeping skin clean and dry  - Evaluate need for skin moisturizer/barrier cream  - Collaborate with interdisciplinary team   - Patient/family teaching  - Consider wound care consult   Outcome: Progressing     Problem: PAIN - ADULT  Goal: Verbalizes/displays adequate comfort level or baseline comfort level  Description: Interventions:  - Encourage patient to monitor pain and request assistance  - Assess pain using appropriate pain scale  - Administer analgesics based on type and severity of pain and evaluate response  - Implement non-pharmacological measures as appropriate and evaluate response  - Consider cultural and social influences on pain and pain management  - Notify physician/advanced practitioner if interventions unsuccessful or patient reports new pain  Outcome: Progressing     Problem: INFECTION - ADULT  Goal: Absence or prevention of progression during hospitalization  Description: INTERVENTIONS:  - Assess and monitor for signs and symptoms of infection  - Monitor lab/diagnostic results  - Monitor all insertion sites, i.e. indwelling lines, tubes, and drains  - Monitor endotracheal if appropriate and nasal secretions for changes in amount and color  - [place] appropriate cooling/warming therapies per order  - Administer medications as ordered  - Instruct and encourage patient and family to use good hand hygiene technique  - Identify and instruct in appropriate isolation precautions for  identified infection/condition  Outcome: Progressing     Problem: SAFETY ADULT  Goal: Maintain or return to baseline ADL function  Description: INTERVENTIONS:  -  Assess patient's ability to carry out ADLs; assess patient's baseline for ADL function and identify physical deficits which impact ability to perform ADLs (bathing, care of mouth/teeth, toileting, grooming, dressing, etc.)  - Assess/evaluate cause of self-care deficits   - Assess range of motion  - Assess patient's mobility; develop plan if impaired  - Assess patient's need for assistive devices and provide as appropriate  - Encourage maximum independence but intervene and supervise when necessary  - Involve family in performance of ADLs  - Assess for home care needs following discharge   - Consider OT consult to assist with ADL evaluation and planning for discharge  - Provide patient education as appropriate  Outcome: Progressing  Goal: Maintains/Returns to pre admission functional level  Description: INTERVENTIONS:  - Perform AM-PAC 6 Click Basic Mobility/ Daily Activity assessment daily.  - Set and communicate daily mobility goal to care team and patient/family/caregiver.   - Collaborate with rehabilitation services on mobility goals if consulted  - Perform Range of Motion 3 times a day.  - Reposition patient every 2 hours.  - Record patient progress and toleration of activity level   Outcome: Progressing     Problem: DISCHARGE PLANNING  Goal: Discharge to home or other facility with appropriate resources  Description: INTERVENTIONS:  - Identify barriers to discharge w/patient and caregiver  - Arrange for needed discharge resources and transportation as appropriate  - Identify discharge learning needs (meds, wound care, etc.)  - Arrange for interpretive services to assist at discharge as needed  - Refer to Case Management Department for coordinating discharge planning if the patient needs post-hospital services based on physician/advanced practitioner  order or complex needs related to functional status, cognitive ability, or social support system  Outcome: Progressing     Problem: Knowledge Deficit  Goal: Patient/family/caregiver demonstrates understanding of disease process, treatment plan, medications, and discharge instructions  Description: Complete learning assessment and assess knowledge base.  Interventions:  - Provide teaching at level of understanding  - Provide teaching via preferred learning methods  Outcome: Progressing     Problem: GASTROINTESTINAL - ADULT  Goal: Maintains or returns to baseline bowel function  Description: INTERVENTIONS:  - Assess bowel function  - Encourage oral fluids to ensure adequate hydration  - Administer IV fluids if ordered to ensure adequate hydration  - Administer ordered medications as needed  - Encourage mobilization and activity  - Consider nutritional services referral to assist patient with adequate nutrition and appropriate food choices  Outcome: Progressing     Problem: COPING  Goal: Pt/Family able to verbalize concerns and demonstrate effective coping strategies  Description: INTERVENTIONS:  - Assist patient/family to identify coping skills, available support systems and cultural and spiritual values  - Provide emotional support, including active listening and acknowledgement of concerns of patient and caregivers  - Reduce environmental stimuli, as able  - Provide patient education  - Assess for spiritual pain/suffering and initiate spiritual care, including notification of Pastoral Care or daryl based community as needed  - Assess effectiveness of coping strategies  Outcome: Progressing

## 2025-02-19 NOTE — ASSESSMENT & PLAN NOTE
Patient having acute mental status changes 1/31/2025  UA positive-see plan for acute cystitis without hematuria  Transferred to [facility] 1/31/2025 for stat head CT-negative for acute intracranial abnormality  Per family, patient had confusion with Oxy in the past, this was discontinued  Continues with waxing and waning mentation  Transitioned to comfort measures only 2/6/2025

## 2025-02-20 VITALS
DIASTOLIC BLOOD PRESSURE: 73 MMHG | TEMPERATURE: 98.4 F | RESPIRATION RATE: 18 BRPM | HEIGHT: 63 IN | BODY MASS INDEX: 51.91 KG/M2 | WEIGHT: 293 LBS | HEART RATE: 62 BPM | SYSTOLIC BLOOD PRESSURE: 153 MMHG | OXYGEN SATURATION: 97 %

## 2025-02-20 PROCEDURE — 99231 SBSQ HOSP IP/OBS SF/LOW 25: CPT

## 2025-02-20 RX ADMIN — ACETAMINOPHEN 650 MG: 325 TABLET ORAL at 02:10

## 2025-02-20 RX ADMIN — LORAZEPAM 1 MG: 1 TABLET ORAL at 02:10

## 2025-02-20 NOTE — CASE MANAGEMENT
Case Management Discharge Planning Note    Patient name Zee Kirk  Location /-01 MRN [id number]  : 1950 Date 2025       Current Admission Date: 2025  Current Admission Diagnosis:Comfort measures only status   Patient Active Problem List    Diagnosis Date Noted Date Diagnosed    Comfort measures only status 2025     Bradycardia 2025     Stage 3a chronic kidney disease ([facility]) 2025     Metabolic encephalopathy 2025     Acute cystitis without hematuria 2025     Change in mental status 2025     Goals of care, counseling/discussion 2025     Bilateral lower extremity edema 2024     Hypotension 2024     Weakness 2024     Paroxysmal A-fib ([facility]) 2024     History of kidney stones 2024     Skin abnormalities 2024     Cataract, nuclear sclerotic senile, right 2024     Chronic heart failure with preserved ejection fraction ([facility]) 2024     Hypoxic episode 2024     Type 2 diabetes mellitus without complication, without long-term current use of insulin ([facility]) 2024     Long term (current) use of anticoagulants 2024     Subtherapeutic international normalized ratio (INR) 2024     Multiple open wounds 2024     Left leg pain 2023     Shingles 2023     Cellulitis- Ruled Out 2023     Longstanding persistent atrial fibrillation ([facility]) 2022     History of Clostridioides difficile infection 2022     Hypothyroid 10/03/2020     Mild episode of recurrent major depressive disorder ([facility]) 10/03/2020     Idiopathic chronic 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 10/03/2020     Class 3 severe obesity due to excess calories with serious comorbidity and body mass index (BMI) greater than or equal to 70 in adult ([facility]) 10/03/2020     Panniculitis 10/03/2020     Gastroesophageal reflux disease without esophagitis 10/03/2020     Hx MRSA infection 2020     Impaired  mobility 02/03/2019     Osteoarthritis of glenohumeral joint 02/03/2019     Left ovarian cyst 09/20/2017     Depression with anxiety 07/15/2017     Anemia 12/28/2016     LARRY (acute kidney injury) (HCC) 12/27/2016     Adjustment disorder 09/05/2013     Irritable bowel syndrome 12/04/2012     Left atrial enlargement 12/04/2012     Left ventricular hypertrophy 12/04/2012     Carpal tunnel syndrome 05/30/2012     Gout 05/30/2012     Hyperlipidemia 05/30/2012     Symptomatic menopausal or female climacteric states 05/30/2012       LOS (days): 23  Geometric Mean LOS (GMLOS) (days): 4.4  Days to GMLOS:-18.7     OBJECTIVE:  Risk of Unplanned Readmission Score: 32.36         Current admission status: Inpatient   Preferred Pharmacy:   [facility] [place] [facility] - [place]  [place] [address]  Phone: [phone] Fax: [phone]    Primary Care Provider: Franklyn Caruso MD    Primary Insurance: MEDICARE  Secondary Insurance: [facility]    DISCHARGE DETAILS:       Freedom of Choice: Yes  Comments - Freedom of Choice: pt is on comfort care- pt has been accepted to [facility]- hospice auth is needed- auth was sent - bariatric walker & bariatric bed is needed - Cheryl is aware and willl order the equipment when the auth is received

## 2025-02-20 NOTE — PROGRESS NOTES
"Progress Note - Hospitalist   Name: Zee Kirk 74 y.o. female I MRN: [id number]  Unit/Bed#: MS 210[facility] I Date of Admission: 1/26/2025   Date of Service: 2/20/2025 I Hospital Day: 24    Assessment & Plan  Comfort measures only status  See also, plan for goals of care  Patient has transitioned to comfort measures only  Oral medications discontinued, no further labs, testing, or treatments  Comfort medications ordered  Referrals have been made to SNF facilities with hospice services.  Will transfer to facility when arrangements finalized by case management  Panniculitis  POA with redness and drainage of bilateral pan eyes similar to admissions  CT imaging with no obvious abscess  Patient was not candidate for panniculectomy in the past  S/p cefepime and vancomycin IV-now off of antibiotics  Infectious disease input appreciated  Transitioned to comfort measures only 2/6/2025  Chronic heart failure with preserved ejection fraction (HCC)  Wt Readings from Last 3 Encounters:   02/06/25 (!) 208 kg (458 lb 1.9 oz)   01/17/25 (!) 205 kg (451 lb)   01/01/25 (!) 205 kg (451 lb 14.4 oz)     11/7/2024 TTE: LVEF 60% with normal systolic function  Outpatient regimen included furosemide 60 mg twice daily   Nonadherent with outpatient Lasix due to hypotension  Nephrology input appreciated   Lasix discontinued   Transitioned to comfort measures only 2/6/2025  Type 2 diabetes mellitus without complication, without long-term current use of insulin (HCC)  Lab Results   Component Value Date    HGBA1C 7.2 (H) 11/06/2024       No results for input(s): \"POCGLU\" in the last 72 hours.        Blood Sugar Average: Last 72 hrs:    Patient denies any history of diabetes  Transitioned to full comfort measures 2/6/2025, allow pleasure feed  No further labs, testing, or treatments  Longstanding persistent atrial fibrillation (HCC)  Heart rate remains 40s to 50s  Was receiving Coumadin for anticoagulation-discontinued 2/6/2025 after " discussion with the family  Cardiology input appreciated  Transitioned to comfort measures only as of 2/6/2025  No further labs, testing, or treatments  Class 3 severe obesity due to excess calories with serious comorbidity and body mass index (BMI) greater than or equal to 70 in adult ([facility])  BMI 81.15  Goals of care, counseling/discussion  Goals of care discussion initiated  Patient has developed encephalopathy and bradycardia  Hospice evaluation appreciated   Case management making referrals for SNF on hospice.  Search has been expanded  Patient has transitioned to comfort measures only 2/6/2025  Hypothyroid  Levothyroxine discontinued  History of Clostridioides difficile infection  S/p oral vancomycin, discontinued   Change in mental status  Patient having acute mental status changes 1/31/2025  UA positive-see plan for acute cystitis without hematuria  Transferred to [facility] 1/31/2025 for stat head CT-negative for acute intracranial abnormality  Per family, patient had confusion with Oxy in the past, this was discontinued  Continues with waxing and waning mentation  Transitioned to comfort measures only 2/6/2025  Acute cystitis without hematuria  UA positive  Patient with numerous antibiotic allergies  S/P treatment with cefepime and vancomycin  Infectious disease input appreciated   Transitioned to comfort measures only 2/6/2025   Metabolic encephalopathy  Due to acute cystitis  CT head negative  Transitioned to comfort measures only 2/6/2025  LARRY (acute kidney injury) ([facility])  Patient on Lasix at home and was requiring blood pressure support with midodrine so Lasix could be given   Appreciate nephrology who followed  Lasix discontinued on 2/6 when transition to comfort measures  Has transitioned to comfort measures only 2/6/2025  Hypotension  Previously on midodrine, discontinued   Bradycardia  Noted to have bradycardia with heart rate in the 40s   Cardiology input appreciated   Please see also, goals of  care discussion   Transitioned to comfort measures only 2/6/2025     VTE Pharmacologic Prophylaxis: VTE Score: 8 Moderate Risk (Score 3-4) - Pharmacological DVT Prophylaxis Contraindicated. Sequential Compression Devices Ordered.    Mobility:   Basic Mobility Inpatient Raw Score: 7  JH-HLM Goal: 2: Bed activities/Dependent transfer  JH-HLM Achieved: 2: Bed activities/Dependent transfer  JH-HLM Goal achieved. Continue to encourage appropriate mobility.    Patient Centered Rounds: I performed bedside rounds with nursing staff today.   Discussions with Specialists or Other Care Team Provider:   Nursing, case management  Current Length of Stay: 24 day(s)  Current Patient Status: Inpatient   Certification Statement: The patient will continue to require additional inpatient hospital stay due to coordination of care  Discharge Plan:  Transitioned to full comfort as of 2/6.  Referrals have been made to SNF with transition to hospice by case management.  Patient was accepted to [facility] on hospice, authorization is pending.  Case management spoke with hospice liaison plan is to deliver bariatric equipment tomorrow to the facility as Auth is still pending.  Will discharge when arrangements finalized.    Code Status: Level 4 - Comfort Care    Subjective   Resting comfortably in bed.  Verbalizing needs.    Objective :  Resp:  [18] 18  SpO2:  [96 %-97 %] 97 %  O2 Device: Nasal cannula  Nasal Cannula O2 Flow Rate (L/min):  [2 L/min] 2 L/min    Body mass index is 81.15 kg/m².     Input and Output Summary (last 24 hours):   No intake or output data in the 24 hours ending 02/20/25 1401      Physical Exam  Vitals reviewed.   Constitutional:       General: She is not in acute distress.     Appearance: She is well-developed.   HENT:      Head: Normocephalic and atraumatic.   Cardiovascular:      Rate and Rhythm: Normal rate. Rhythm irregular.      Heart sounds: No murmur heard.  Pulmonary:      Effort: Pulmonary effort is  normal. No respiratory distress.      Breath sounds: Normal breath sounds. No wheezing or rales.   Abdominal:      General: There is no distension.      Palpations: Abdomen is soft.      Tenderness: There is no abdominal tenderness. There is no guarding.   Musculoskeletal:         General: No swelling.   Skin:     General: Skin is warm and dry.      Capillary Refill: Capillary refill takes less than 2 seconds.   Neurological:      General: No focal deficit present.      Mental Status: She is alert and oriented to person, place, and time. Mental status is at baseline.   Psychiatric:         Mood and Affect: Mood normal.         Behavior: Behavior normal.           Lines/Drains:              Lab Results: I have reviewed the following results:                             Recent Cultures (last 7 days):         Imaging Results Review: I reviewed radiology reports from this admission including: CT abdomen pelvis, CT head.  Other Study Results Review: No additional pertinent studies reviewed.    Last 24 Hours Medication List:     Current Facility-Administered Medications:     acetaminophen (TYLENOL) tablet 650 mg, Q6H PRN    diphenhydrAMINE (BENADRYL) injection 50 mg, Q6H PRN    LORazepam (ATIVAN) tablet 1 mg, Q3H PRN    morphine injection 2 mg, Q3H PRN    ondansetron (ZOFRAN) injection 4 mg, Q6H PRN    Administrative Statements   Today, Patient Was Seen By: ORACIO Last  I have spent a total time of 37 minutes in caring for this patient on the day of the visit/encounter including Counseling / Coordination of care, Documenting in the medical record, Reviewing/placing orders in the medical record (including tests, medications, and/or procedures), Obtaining or reviewing history  , and Communicating with other healthcare professionals .    **Please Note: This note may have been constructed using a voice recognition system.**

## 2025-02-20 NOTE — ASSESSMENT & PLAN NOTE
Patient having acute mental status changes 1/31/2025  UA positive-see plan for acute cystitis without hematuria  Transferred to [facility] 1/31/2025 for stat head CT-negative for acute intracranial abnormality  Per family, patient had confusion with Oxy in the past, this was discontinued  Continues with waxing and waning mentation  Transitioned to comfort measures only 2/6/2025

## 2025-02-20 NOTE — CASE MANAGEMENT
Rec'd fax from [facility] [employer] to fill out a form and fax it to F# [phone].    Form sent     Cm notified Chata Berkowitz

## 2025-02-20 NOTE — CASE MANAGEMENT
Case Management Discharge Planning Note    Patient name Zee Kirk  Location /-01 MRN [id number]  : 1950 Date 2025       Current Admission Date: 2025  Current Admission Diagnosis:Comfort measures only status   Patient Active Problem List    Diagnosis Date Noted Date Diagnosed    Comfort measures only status 2025     Bradycardia 2025     Stage 3a chronic kidney disease ([facility]) 2025     Metabolic encephalopathy 2025     Acute cystitis without hematuria 2025     Change in mental status 2025     Goals of care, counseling/discussion 2025     Bilateral lower extremity edema 2024     Hypotension 2024     Weakness 2024     Paroxysmal A-fib ([facility]) 2024     History of kidney stones 2024     Skin abnormalities 2024     Cataract, nuclear sclerotic senile, right 2024     Chronic heart failure with preserved ejection fraction ([facility]) 2024     Hypoxic episode 2024     Type 2 diabetes mellitus without complication, without long-term current use of insulin ([facility]) 2024     Long term (current) use of anticoagulants 2024     Subtherapeutic international normalized ratio (INR) 2024     Multiple open wounds 2024     Left leg pain 2023     Shingles 2023     Cellulitis- Ruled Out 2023     Longstanding persistent atrial fibrillation ([facility]) 2022     History of Clostridioides difficile infection 2022     Hypothyroid 10/03/2020     Mild episode of recurrent major depressive disorder ([facility]) 10/03/2020     Idiopathic chronic 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 10/03/2020     Class 3 severe obesity due to excess calories with serious comorbidity and body mass index (BMI) greater than or equal to 70 in adult ([facility]) 10/03/2020     Panniculitis 10/03/2020     Gastroesophageal reflux disease without esophagitis 10/03/2020     Hx MRSA infection 2020     Impaired  mobility 02/03/2019     Osteoarthritis of glenohumeral joint 02/03/2019     Left ovarian cyst 09/20/2017     Depression with anxiety 07/15/2017     Anemia 12/28/2016     LARRY (acute kidney injury) (HCC) 12/27/2016     Adjustment disorder 09/05/2013     Irritable bowel syndrome 12/04/2012     Left atrial enlargement 12/04/2012     Left ventricular hypertrophy 12/04/2012     Carpal tunnel syndrome 05/30/2012     Gout 05/30/2012     Hyperlipidemia 05/30/2012     Symptomatic menopausal or female climacteric states 05/30/2012       LOS (days): 24  Geometric Mean LOS (GMLOS) (days): 4.4  Days to GMLOS:-19.3     OBJECTIVE:  Risk of Unplanned Readmission Score: 32.62         Current admission status: Inpatient   Preferred Pharmacy:   [facility] [place] [facility] - [facility] [address]  [place] [address]  Phone: [phone] Fax: [phone]    Primary Care Provider: Franklyn Caruso MD    Primary Insurance: MEDICARE  Secondary Insurance: [facility]    DISCHARGE DETAILS:    Discharge planning discussed with:: patient                    CM communicated with [facility] Hospice Liaison regarding need for bariatric equipment to be delivered tomorrow based on pending auth today.     CM to follow for [employer] auth for admission to [facility] on Comfort Care.

## 2025-02-20 NOTE — CASE MANAGEMENT
Rec's fax from [facility] [employer], stated auth request is not required.  Cm notified Chata ACEVEDO

## 2025-02-20 NOTE — PLAN OF CARE
Problem: Prexisting or High Potential for Compromised Skin Integrity  Goal: Skin integrity is maintained or improved  Description: INTERVENTIONS:  - Identify patients at risk for skin breakdown  - Assess and monitor skin integrity  - Assess and monitor nutrition and hydration status  - Monitor labs   - Assess for incontinence   - Turn and reposition patient  - Assist with mobility/ambulation  - Relieve pressure over bony prominences  - Avoid friction and shearing  - Provide appropriate hygiene as needed including keeping skin clean and dry  - Evaluate need for skin moisturizer/barrier cream  - Collaborate with interdisciplinary team   - Patient/family teaching  - Consider wound care consult   Outcome: Progressing     Problem: PAIN - ADULT  Goal: Verbalizes/displays adequate comfort level or baseline comfort level  Description: Interventions:  - Encourage patient to monitor pain and request assistance  - Assess pain using appropriate pain scale  - Administer analgesics based on type and severity of pain and evaluate response  - Implement non-pharmacological measures as appropriate and evaluate response  - Consider cultural and social influences on pain and pain management  - Notify physician/advanced practitioner if interventions unsuccessful or patient reports new pain  Outcome: Progressing     Problem: INFECTION - ADULT  Goal: Absence or prevention of progression during hospitalization  Description: INTERVENTIONS:  - Assess and monitor for signs and symptoms of infection  - Monitor lab/diagnostic results  - Monitor all insertion sites, i.e. indwelling lines, tubes, and drains  - Monitor endotracheal if appropriate and nasal secretions for changes in amount and color  - [place] appropriate cooling/warming therapies per order  - Administer medications as ordered  - Instruct and encourage patient and family to use good hand hygiene technique  - Identify and instruct in appropriate isolation precautions for  identified infection/condition  Outcome: Progressing     Problem: SAFETY ADULT  Goal: Maintain or return to baseline ADL function  Description: INTERVENTIONS:  -  Assess patient's ability to carry out ADLs; assess patient's baseline for ADL function and identify physical deficits which impact ability to perform ADLs (bathing, care of mouth/teeth, toileting, grooming, dressing, etc.)  - Assess/evaluate cause of self-care deficits   - Assess range of motion  - Assess patient's mobility; develop plan if impaired  - Assess patient's need for assistive devices and provide as appropriate  - Encourage maximum independence but intervene and supervise when necessary  - Involve family in performance of ADLs  - Assess for home care needs following discharge   - Consider OT consult to assist with ADL evaluation and planning for discharge  - Provide patient education as appropriate  Outcome: Progressing  Goal: Maintains/Returns to pre admission functional level  Description: INTERVENTIONS:  - Perform AM-PAC 6 Click Basic Mobility/ Daily Activity assessment daily.  - Set and communicate daily mobility goal to care team and patient/family/caregiver.   - Collaborate with rehabilitation services on mobility goals if consulted  - Perform Range of Motion 3 times a day.  - Reposition patient every 2 hours.  - Record patient progress and toleration of activity level   Outcome: Progressing     Problem: DISCHARGE PLANNING  Goal: Discharge to home or other facility with appropriate resources  Description: INTERVENTIONS:  - Identify barriers to discharge w/patient and caregiver  - Arrange for needed discharge resources and transportation as appropriate  - Identify discharge learning needs (meds, wound care, etc.)  - Arrange for interpretive services to assist at discharge as needed  - Refer to Case Management Department for coordinating discharge planning if the patient needs post-hospital services based on physician/advanced practitioner  order or complex needs related to functional status, cognitive ability, or social support system  Outcome: Progressing     Problem: Knowledge Deficit  Goal: Patient/family/caregiver demonstrates understanding of disease process, treatment plan, medications, and discharge instructions  Description: Complete learning assessment and assess knowledge base.  Interventions:  - Provide teaching at level of understanding  - Provide teaching via preferred learning methods  Outcome: Progressing     Problem: GASTROINTESTINAL - ADULT  Goal: Maintains or returns to baseline bowel function  Description: INTERVENTIONS:  - Assess bowel function  - Encourage oral fluids to ensure adequate hydration  - Administer IV fluids if ordered to ensure adequate hydration  - Administer ordered medications as needed  - Encourage mobilization and activity  - Consider nutritional services referral to assist patient with adequate nutrition and appropriate food choices  Outcome: Progressing     Problem: COPING  Goal: Pt/Family able to verbalize concerns and demonstrate effective coping strategies  Description: INTERVENTIONS:  - Assist patient/family to identify coping skills, available support systems and cultural and spiritual values  - Provide emotional support, including active listening and acknowledgement of concerns of patient and caregivers  - Reduce environmental stimuli, as able  - Provide patient education  - Assess for spiritual pain/suffering and initiate spiritual care, including notification of Pastoral Care or daryl based community as needed  - Assess effectiveness of coping strategies  Outcome: Progressing

## 2025-02-20 NOTE — PLAN OF CARE
Problem: Prexisting or High Potential for Compromised Skin Integrity  Goal: Skin integrity is maintained or improved  Description: INTERVENTIONS:  - Identify patients at risk for skin breakdown  - Assess and monitor skin integrity  - Assess and monitor nutrition and hydration status  - Monitor labs   - Assess for incontinence   - Turn and reposition patient  - Assist with mobility/ambulation  - Relieve pressure over bony prominences  - Avoid friction and shearing  - Provide appropriate hygiene as needed including keeping skin clean and dry  - Evaluate need for skin moisturizer/barrier cream  - Collaborate with interdisciplinary team   - Patient/family teaching  - Consider wound care consult   Outcome: Progressing     Problem: PAIN - ADULT  Goal: Verbalizes/displays adequate comfort level or baseline comfort level  Description: Interventions:  - Encourage patient to monitor pain and request assistance  - Assess pain using appropriate pain scale  - Administer analgesics based on type and severity of pain and evaluate response  - Implement non-pharmacological measures as appropriate and evaluate response  - Consider cultural and social influences on pain and pain management  - Notify physician/advanced practitioner if interventions unsuccessful or patient reports new pain  Outcome: Progressing     Problem: INFECTION - ADULT  Goal: Absence or prevention of progression during hospitalization  Description: INTERVENTIONS:  - Assess and monitor for signs and symptoms of infection  - Monitor lab/diagnostic results  - Monitor all insertion sites, i.e. indwelling lines, tubes, and drains  - Monitor endotracheal if appropriate and nasal secretions for changes in amount and color  - [place] appropriate cooling/warming therapies per order  - Administer medications as ordered  - Instruct and encourage patient and family to use good hand hygiene technique  - Identify and instruct in appropriate isolation precautions for  identified infection/condition  Outcome: Progressing     Problem: SAFETY ADULT  Goal: Maintain or return to baseline ADL function  Description: INTERVENTIONS:  -  Assess patient's ability to carry out ADLs; assess patient's baseline for ADL function and identify physical deficits which impact ability to perform ADLs (bathing, care of mouth/teeth, toileting, grooming, dressing, etc.)  - Assess/evaluate cause of self-care deficits   - Assess range of motion  - Assess patient's mobility; develop plan if impaired  - Assess patient's need for assistive devices and provide as appropriate  - Encourage maximum independence but intervene and supervise when necessary  - Involve family in performance of ADLs  - Assess for home care needs following discharge   - Consider OT consult to assist with ADL evaluation and planning for discharge  - Provide patient education as appropriate  Outcome: Progressing  Goal: Maintains/Returns to pre admission functional level  Description: INTERVENTIONS:  - Perform AM-PAC 6 Click Basic Mobility/ Daily Activity assessment daily.  - Set and communicate daily mobility goal to care team and patient/family/caregiver.   - Collaborate with rehabilitation services on mobility goals if consulted  - Perform Range of Motion 3 times a day.  - Reposition patient every 2 hours.  - Record patient progress and toleration of activity level   Outcome: Progressing     Problem: DISCHARGE PLANNING  Goal: Discharge to home or other facility with appropriate resources  Description: INTERVENTIONS:  - Identify barriers to discharge w/patient and caregiver  - Arrange for needed discharge resources and transportation as appropriate  - Identify discharge learning needs (meds, wound care, etc.)  - Arrange for interpretive services to assist at discharge as needed  - Refer to Case Management Department for coordinating discharge planning if the patient needs post-hospital services based on physician/advanced practitioner  order or complex needs related to functional status, cognitive ability, or social support system  Outcome: Progressing     Problem: Knowledge Deficit  Goal: Patient/family/caregiver demonstrates understanding of disease process, treatment plan, medications, and discharge instructions  Description: Complete learning assessment and assess knowledge base.  Interventions:  - Provide teaching at level of understanding  - Provide teaching via preferred learning methods  Outcome: Progressing     Problem: GASTROINTESTINAL - ADULT  Goal: Maintains or returns to baseline bowel function  Description: INTERVENTIONS:  - Assess bowel function  - Encourage oral fluids to ensure adequate hydration  - Administer IV fluids if ordered to ensure adequate hydration  - Administer ordered medications as needed  - Encourage mobilization and activity  - Consider nutritional services referral to assist patient with adequate nutrition and appropriate food choices  Outcome: Progressing     Problem: COPING  Goal: Pt/Family able to verbalize concerns and demonstrate effective coping strategies  Description: INTERVENTIONS:  - Assist patient/family to identify coping skills, available support systems and cultural and spiritual values  - Provide emotional support, including active listening and acknowledgement of concerns of patient and caregivers  - Reduce environmental stimuli, as able  - Provide patient education  - Assess for spiritual pain/suffering and initiate spiritual care, including notification of Pastoral Care or daryl based community as needed  - Assess effectiveness of coping strategies  Outcome: Progressing

## 2025-02-20 NOTE — CASE MANAGEMENT
Case Management Discharge Planning Note    Patient name Zee Kirk  Location /-01 MRN [id number]  : 1950 Date 2025       Current Admission Date: 2025  Current Admission Diagnosis:Comfort measures only status   Patient Active Problem List    Diagnosis Date Noted Date Diagnosed    Comfort measures only status 2025     Bradycardia 2025     Stage 3a chronic kidney disease ([facility]) 2025     Metabolic encephalopathy 2025     Acute cystitis without hematuria 2025     Change in mental status 2025     Goals of care, counseling/discussion 2025     Bilateral lower extremity edema 2024     Hypotension 2024     Weakness 2024     Paroxysmal A-fib ([facility]) 2024     History of kidney stones 2024     Skin abnormalities 2024     Cataract, nuclear sclerotic senile, right 2024     Chronic heart failure with preserved ejection fraction ([facility]) 2024     Hypoxic episode 2024     Type 2 diabetes mellitus without complication, without long-term current use of insulin ([facility]) 2024     Long term (current) use of anticoagulants 2024     Subtherapeutic international normalized ratio (INR) 2024     Multiple open wounds 2024     Left leg pain 2023     Shingles 2023     Cellulitis- Ruled Out 2023     Longstanding persistent atrial fibrillation ([facility]) 2022     History of Clostridioides difficile infection 2022     Hypothyroid 10/03/2020     Mild episode of recurrent major depressive disorder ([facility]) 10/03/2020     Idiopathic chronic 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 10/03/2020     Class 3 severe obesity due to excess calories with serious comorbidity and body mass index (BMI) greater than or equal to 70 in adult ([facility]) 10/03/2020     Panniculitis 10/03/2020     Gastroesophageal reflux disease without esophagitis 10/03/2020     Hx MRSA infection 2020     Impaired  mobility 02/03/2019     Osteoarthritis of glenohumeral joint 02/03/2019     Left ovarian cyst 09/20/2017     Depression with anxiety 07/15/2017     Anemia 12/28/2016     LARRY (acute kidney injury) ([facility]) 12/27/2016     Adjustment disorder 09/05/2013     Irritable bowel syndrome 12/04/2012     Left atrial enlargement 12/04/2012     Left ventricular hypertrophy 12/04/2012     Carpal tunnel syndrome 05/30/2012     Gout 05/30/2012     Hyperlipidemia 05/30/2012     Symptomatic menopausal or female climacteric states 05/30/2012       LOS (days): 24  Geometric Mean LOS (GMLOS) (days): 4.4  Days to GMLOS:-19.4     OBJECTIVE:  Risk of Unplanned Readmission Score: 32.68         Current admission status: Inpatient   Preferred Pharmacy:   [facility] [place]  [address]  [place] [address]  Phone: [phone] Fax: [phone]    Primary Care Provider: Franklyn Caruso MD    Primary Insurance: MEDICARE  Secondary Insurance: [facility]    DISCHARGE DETAILS:              CM uploaded letter received, in [employer],  from [facility] indicating auth for Code 904 - Nursing Facility(Residential/shelter Care) does not require prior auth.  CM to follow for response from [facility].     CM placed letter in for filing to chart.    1400 CM left voice message for [facility] assuring patient could be accepted tomorrow with letter uploaded in [facility] from [facility].     CM to follow.       1530 CM received message from [facility] indicating if they do not have Room and Board auth for patient under [facility] and patient admits under Hospice she will have $324.00 per day room & board private pay fee.  CM made [facility] aware patient is not yet signed onto hospice.and inquired if facility can accept under Medicare.    CM to follow.

## 2025-02-21 PROCEDURE — 99239 HOSP IP/OBS DSCHRG MGMT >30: CPT

## 2025-02-21 RX ORDER — LORAZEPAM 1 MG/1
1 TABLET ORAL
Qty: 12 TABLET | Refills: 0 | Status: SHIPPED | OUTPATIENT
Start: 2025-02-21 | End: 2025-03-04

## 2025-02-21 RX ORDER — ONDANSETRON 4 MG/1
4 TABLET, FILM COATED ORAL EVERY 8 HOURS PRN
Qty: 20 TABLET | Refills: 0
Start: 2025-02-21

## 2025-02-21 RX ORDER — MORPHINE SULFATE 20 MG/ML
10 SOLUTION ORAL EVERY 4 HOURS PRN
Qty: 15 ML | Refills: 0 | Status: SHIPPED | OUTPATIENT
Start: 2025-02-21 | End: 2025-03-03

## 2025-02-21 RX ADMIN — ONDANSETRON 4 MG: 2 INJECTION INTRAMUSCULAR; INTRAVENOUS at 01:37

## 2025-02-21 NOTE — ASSESSMENT & PLAN NOTE
POA with redness and drainage of bilateral pan eyes similar to admissions  CT imaging with no obvious abscess  Patient was not candidate for panniculectomy in the past  S/p cefepime and vancomycin IV-has been off of antibiotics  Infectious disease input appreciated  Transitioned to comfort measures only 2/6/2025  Discharging to SNF today as arranged by case management

## 2025-02-21 NOTE — ASSESSMENT & PLAN NOTE
Heart rate remains 40s to 50s  Was receiving Coumadin for anticoagulation-discontinued 2/6/2025 after discussion with the family  Cardiology input appreciated  Transitioned to comfort measures only as of 2/6/2025  No further labs, testing, or treatments  Discharging to SNF today as arranged by case management

## 2025-02-21 NOTE — ASSESSMENT & PLAN NOTE
"Lab Results   Component Value Date    HGBA1C 7.2 (H) 11/06/2024       No results for input(s): \"POCGLU\" in the last 72 hours.        Blood Sugar Average: Last 72 hrs:    Patient denies any history of diabetes  Transitioned to full comfort measures 2/6/2025, allow pleasure feed  No further labs, testing, or treatments  Discharging to SNF as arranged by case management  "

## 2025-02-21 NOTE — ASSESSMENT & PLAN NOTE
See also, plan for goals of care  Patient has transitioned to comfort measures only  Oral medications discontinued, no further labs, testing, or treatments  Comfort medications ordered  Discharging to SNF today as arranged by case management

## 2025-02-21 NOTE — PLAN OF CARE
Problem: Prexisting or High Potential for Compromised Skin Integrity  Goal: Skin integrity is maintained or improved  Description: INTERVENTIONS:  - Identify patients at risk for skin breakdown  - Assess and monitor skin integrity  - Assess and monitor nutrition and hydration status  - Monitor labs   - Assess for incontinence   - Turn and reposition patient  - Assist with mobility/ambulation  - Relieve pressure over bony prominences  - Avoid friction and shearing  - Provide appropriate hygiene as needed including keeping skin clean and dry  - Evaluate need for skin moisturizer/barrier cream  - Collaborate with interdisciplinary team   - Patient/family teaching  - Consider wound care consult   2/21/2025 1556 by Patti Paz RN  Outcome: Adequate for Discharge  2/21/2025 1555 by Patti Paz RN  Outcome: Adequate for Discharge     Problem: PAIN - ADULT  Goal: Verbalizes/displays adequate comfort level or baseline comfort level  Description: Interventions:  - Encourage patient to monitor pain and request assistance  - Assess pain using appropriate pain scale  - Administer analgesics based on type and severity of pain and evaluate response  - Implement non-pharmacological measures as appropriate and evaluate response  - Consider cultural and social influences on pain and pain management  - Notify physician/advanced practitioner if interventions unsuccessful or patient reports new pain  2/21/2025 1556 by Patti Paz RN  Outcome: Adequate for Discharge  2/21/2025 1555 by Patti Paz RN  Outcome: Adequate for Discharge     Problem: INFECTION - ADULT  Goal: Absence or prevention of progression during hospitalization  Description: INTERVENTIONS:  - Assess and monitor for signs and symptoms of infection  - Monitor lab/diagnostic results  - Monitor all insertion sites, i.e. indwelling lines, tubes, and drains  - Monitor endotracheal if appropriate and nasal secretions for changes in amount and color  - [place] appropriate  cooling/warming therapies per order  - Administer medications as ordered  - Instruct and encourage patient and family to use good hand hygiene technique  - Identify and instruct in appropriate isolation precautions for identified infection/condition  2/21/2025 1556 by Patti Paz RN  Outcome: Adequate for Discharge  2/21/2025 1555 by Patti Paz RN  Outcome: Adequate for Discharge     Problem: SAFETY ADULT  Goal: Maintain or return to baseline ADL function  Description: INTERVENTIONS:  -  Assess patient's ability to carry out ADLs; assess patient's baseline for ADL function and identify physical deficits which impact ability to perform ADLs (bathing, care of mouth/teeth, toileting, grooming, dressing, etc.)  - Assess/evaluate cause of self-care deficits   - Assess range of motion  - Assess patient's mobility; develop plan if impaired  - Assess patient's need for assistive devices and provide as appropriate  - Encourage maximum independence but intervene and supervise when necessary  - Involve family in performance of ADLs  - Assess for home care needs following discharge   - Consider OT consult to assist with ADL evaluation and planning for discharge  - Provide patient education as appropriate  2/21/2025 1556 by Patti Paz RN  Outcome: Adequate for Discharge  2/21/2025 1555 by Patti Paz RN  Outcome: Adequate for Discharge  Goal: Maintains/Returns to pre admission functional level  Description: INTERVENTIONS:  - Perform AM-PAC 6 Click Basic Mobility/ Daily Activity assessment daily.  - Set and communicate daily mobility goal to care team and patient/family/caregiver.   - Collaborate with rehabilitation services on mobility goals if consulted  - Perform Range of Motion 3 times a day.  - Reposition patient every 2 hours.  - Record patient progress and toleration of activity level   2/21/2025 1556 by Patti Paz RN  Outcome: Adequate for Discharge  2/21/2025 1555 by Patti Paz RN  Outcome: Adequate for Discharge      Problem: DISCHARGE PLANNING  Goal: Discharge to home or other facility with appropriate resources  Description: INTERVENTIONS:  - Identify barriers to discharge w/patient and caregiver  - Arrange for needed discharge resources and transportation as appropriate  - Identify discharge learning needs (meds, wound care, etc.)  - Arrange for interpretive services to assist at discharge as needed  - Refer to Case Management Department for coordinating discharge planning if the patient needs post-hospital services based on physician/advanced practitioner order or complex needs related to functional status, cognitive ability, or social support system  2/21/2025 1556 by Patti Paz RN  Outcome: Adequate for Discharge  2/21/2025 1555 by Patti Paz RN  Outcome: Adequate for Discharge     Problem: Knowledge Deficit  Goal: Patient/family/caregiver demonstrates understanding of disease process, treatment plan, medications, and discharge instructions  Description: Complete learning assessment and assess knowledge base.  Interventions:  - Provide teaching at level of understanding  - Provide teaching via preferred learning methods  2/21/2025 1556 by Patti Paz RN  Outcome: Adequate for Discharge  2/21/2025 1555 by Patti Paz RN  Outcome: Adequate for Discharge     Problem: GASTROINTESTINAL - ADULT  Goal: Maintains or returns to baseline bowel function  Description: INTERVENTIONS:  - Assess bowel function  - Encourage oral fluids to ensure adequate hydration  - Administer IV fluids if ordered to ensure adequate hydration  - Administer ordered medications as needed  - Encourage mobilization and activity  - Consider nutritional services referral to assist patient with adequate nutrition and appropriate food choices  2/21/2025 1556 by Patti Paz RN  Outcome: Adequate for Discharge  2/21/2025 1555 by Patti Paz RN  Outcome: Adequate for Discharge     Problem: COPING  Goal: Pt/Family able to verbalize concerns and demonstrate  effective coping strategies  Description: INTERVENTIONS:  - Assist patient/family to identify coping skills, available support systems and cultural and spiritual values  - Provide emotional support, including active listening and acknowledgement of concerns of patient and caregivers  - Reduce environmental stimuli, as able  - Provide patient education  - Assess for spiritual pain/suffering and initiate spiritual care, including notification of Pastoral Care or daryl based community as needed  - Assess effectiveness of coping strategies  2/21/2025 1556 by Patti Paz RN  Outcome: Adequate for Discharge  2/21/2025 1555 by Patti Paz RN  Outcome: Adequate for Discharge

## 2025-02-21 NOTE — ASSESSMENT & PLAN NOTE
Noted to have bradycardia with heart rate in the 40s   Cardiology input appreciated   Please see also, goals of care discussion   Transitioned to comfort measures only 2/6/2025   Discharging to SNF today as arranged by case management

## 2025-02-21 NOTE — DISCHARGE SUMMARY
"Discharge Summary - Hospitalist   Name: Zee Kirk 74 y.o. female I MRN: [id number]  Unit/Bed#: -[facility] I Date of Admission: 1/26/2025   Date of Service: 2/21/2025 I Hospital Day: 25     Assessment & Plan  Comfort measures only status  See also, plan for goals of care  Patient has transitioned to comfort measures only  Oral medications discontinued, no further labs, testing, or treatments  Comfort medications ordered  Discharging to SNF today as arranged by case management  Panniculitis  POA with redness and drainage of bilateral pan eyes similar to admissions  CT imaging with no obvious abscess  Patient was not candidate for panniculectomy in the past  S/p cefepime and vancomycin IV-has been off of antibiotics  Infectious disease input appreciated  Transitioned to comfort measures only 2/6/2025  Discharging to SNF today as arranged by case management  Chronic heart failure with preserved ejection fraction (HCC)  Wt Readings from Last 3 Encounters:   02/06/25 (!) 208 kg (458 lb 1.9 oz)   01/17/25 (!) 205 kg (451 lb)   01/01/25 (!) 205 kg (451 lb 14.4 oz)     11/7/2024 TTE: LVEF 60% with normal systolic function  Outpatient regimen included furosemide 60 mg twice daily   Nonadherent with outpatient Lasix due to hypotension  Nephrology input appreciated   Lasix discontinued on 2/6 when transitioned to comfort measures  Transitioned to comfort measures only 2/6/2025  Discharging to SNF today as arranged by case management  Type 2 diabetes mellitus without complication, without long-term current use of insulin ([facility])  Lab Results   Component Value Date    HGBA1C 7.2 (H) 11/06/2024       No results for input(s): \"POCGLU\" in the last 72 hours.        Blood Sugar Average: Last 72 hrs:    Patient denies any history of diabetes  Transitioned to full comfort measures 2/6/2025, allow pleasure feed  No further labs, testing, or treatments  Discharging to SNF as arranged by case management  Longstanding persistent " atrial fibrillation ([facility])  Heart rate remains 40s to 50s  Was receiving Coumadin for anticoagulation-discontinued 2/6/2025 after discussion with the family  Cardiology input appreciated  Transitioned to comfort measures only as of 2/6/2025  No further labs, testing, or treatments  Discharging to SNF today as arranged by case management  Class 3 severe obesity due to excess calories with serious comorbidity and body mass index (BMI) greater than or equal to 70 in adult ([facility])  BMI 81.15  Goals of care, counseling/discussion  Goals of care discussion initiated  Patient has developed encephalopathy and bradycardia  Hospice evaluation appreciated   Case management making referrals for SNF on hospice.  Search has been expanded  Patient has transitioned to comfort measures only 2/6/2025  Discharge to SNF today as arranged by case management  Hypothyroid  Levothyroxine discontinued when transitioned to comfort measures  History of Clostridioides difficile infection  S/p oral vancomycin, discontinued   Change in mental status  Patient having acute mental status changes 1/31/2025  UA positive-see plan for acute cystitis without hematuria  Transferred to [facility] 1/31/2025 for stat head CT-negative for acute intracranial abnormality  Per family, patient had confusion with Oxy in the past, this was discontinued  Alert and oriented today x 3  Transitioned to comfort measures only 2/6/2025  Discharging to SNF today as arranged by case management  Acute cystitis without hematuria  UA positive  S/P treatment with cefepime and vancomycin  Infectious disease input appreciated   No urinary symptoms  Transitioned to comfort measures only 2/6/2025   Discharging to SNF today as arranged by case management  Metabolic encephalopathy  Due to acute cystitis  CT head negative  Transitioned to comfort measures only 2/6/2025  Discharging to SNF today as arranged by case management.  LARRY (acute kidney injury) ([facility])  Patient on Lasix at  home and was requiring blood pressure support with midodrine so Lasix could be given   Appreciate nephrology who followed  Lasix discontinued on 2/6 when transitioned to comfort care  No further labs testing or treatments  Discharging to SNF today as arranged by case management  Hypotension  Previously on midodrine, discontinued   On comfort measures  Blood pressure acceptable  Bradycardia  Noted to have bradycardia with heart rate in the 40s   Cardiology input appreciated   Please see also, goals of care discussion   Transitioned to comfort measures only 2/6/2025   Discharging to SNF today as arranged by case management     Medical Problems       Resolved Problems  Date Reviewed: 2/19/2025   None       Discharging Physician / Practitioner: ORACIO Last  PCP: Franklyn Caruso MD  Admission Date:   Admission Orders (From admission, onward)       Ordered        01/27/25 1717  INPATIENT ADMISSION  Once            01/27/25 0017  Place in Observation  Once                          Discharge Date: 02/21/25    Consultations During Hospital Stay:  ID, cardiology, nephrology, hospice    Procedures Performed:   None    Significant Findings / Test Results:   1/26 CT abdomen pelvis: Right nephrolithiasis with 6 mm calculus right renal pelvis.  No hydronephrosis.  Diverticulosis without evidence of diverticulitis  1/26:/A/P has been negative  1/26 procalcitonin less than 0.05  1/27 procalcitonin 0.06  1/31 UA +1 leukocytes, 4-10,000 WBCs, moderate bacteria  1/31 blood cultures x 2 negative after 5 days  1/31 MRSA swab negative  2/1 CT head: No acute intracranial hemorrhage, midline shift or mass effect.  Chronic small vessel ischemic changes    Incidental Findings:   None    Test Results Pending at Discharge (will require follow up):   None     Outpatient Tests Requested:  None    Complications: None    Reason for Admission: Panniculitis    Hospital Course:   Zee Kirk is a 74 y.o. female patient  who originally presented to the hospital on 1/26/2025 due to redness on her abdomen with drainage.  She has a past medical history of chronic A-fib on Coumadin, diabetes type 2 not on insulin, morbid obesity, hypothyroidism, chronic heart failure with preserved EF, and history of C. difficile.  No acute findings on her CT on arrival.  She reported redness and open wounds on her abdomen with increased drainage prior to arrival.  Also reported poor appetite with nausea.  IV antibiotics and admitted to [facility] service for further management.  Patient did report that she was not compliant with her Lasix regimen as prescribed due to hypotension at home despite being on midodrine.  She was averaging taking it once a day instead of twice a day.  Fluid status was monitored closely and diuresed accordingly.  Initially receiving IV Lasix, however, was hypotensive despite being maxed on midodrine.  Cardiology saw patient while here and recommended to continue prehospital diuretic with midodrine to support blood pressure.  ID also followed patient for her panniculitis and asymptomatic bacteriuria.  Patient received a course of cefepime and vancomycin for her panniculitis and was on oral vancomycin due to history of C. difficile.  Goals of care discussion was initiated given patient was remaining hypotensive difficult to diurese despite being maxed on midodrine, and was also having periods of bradycardia.  After goals of care discussion it was decided that patient would transition to comfort care.  Patient was transitioned to full comfort measures as of 2/6/2025.  Patient is being discharged to [facility] skilled nursing facility today as arranged by case management.          Please see above list of diagnoses and related plan for additional information.     Condition at Discharge: good    Discharge Day Visit / Exam:   Subjective: Denies dizziness lightheadedness chest pain or palpitations.  Denies pain or discomfort.  Vitals: Blood  "Pressure: 153/73 (02/17/25 2205)  Pulse: 62 (02/18/25 2202)  Temperature: 98.4 °F (36.9 °C) (02/17/25 2205)  Temp Source: Oral (02/10/25 1507)  Respirations: 18 (02/19/25 1500)  Height: 5' 3\" (160 cm) (01/27/25 2008)  Weight - Scale: (!) 208 kg (458 lb 1.9 oz) (02/06/25 0600)  SpO2: 97 % (02/20/25 1250)  Physical Exam  Vitals reviewed.   Constitutional:       General: She is not in acute distress.     Appearance: She is well-developed.   HENT:      Head: Normocephalic and atraumatic.   Cardiovascular:      Rate and Rhythm: Normal rate and regular rhythm.      Pulses: Normal pulses.      Heart sounds: Normal heart sounds. No murmur heard.  Pulmonary:      Effort: Pulmonary effort is normal. No respiratory distress.      Breath sounds: Normal breath sounds. No wheezing or rales.   Abdominal:      General: Bowel sounds are normal. There is no distension.      Palpations: Abdomen is soft.      Tenderness: There is no abdominal tenderness. There is no guarding.   Musculoskeletal:         General: Swelling present. Normal range of motion.      Right lower leg: Edema present.      Left lower leg: Edema present.      Comments: Chronic lymphedema at baseline   Skin:     General: Skin is warm and dry.      Capillary Refill: Capillary refill takes less than 2 seconds.   Neurological:      General: No focal deficit present.      Mental Status: She is alert. Mental status is at baseline.   Psychiatric:         Mood and Affect: Mood normal.         Thought Content: Thought content normal.        Discussion with Family: Updated  (niece) via phone.    Discharge instructions/Information to patient and family:   See after visit summary for information provided to patient and family.      Provisions for Follow-Up Care:  See after visit summary for information related to follow-up care and any pertinent home health orders.      Mobility at time of Discharge:   Basic Mobility Inpatient Raw Score: 7  -HLM Goal: 2: Bed " activities/Dependent transfer  JH-HLM Achieved: 2: Bed activities/Dependent transfer  HLM Goal achieved. Continue to encourage appropriate mobility.     Disposition:   Other Skilled Nursing Facility at [place]    Planned Readmission: No    Discharge Medications:  See after visit summary for reconciled discharge medications provided to patient and/or family.      Administrative Statements   Discharge Statement:  I have spent a total time of 38 minutes in caring for this patient on the day of the visit/encounter. >30 minutes of time was spent on: Patient and family education, Counseling / Coordination of care, Documenting in the medical record, Reviewing / ordering tests, medicine, procedures  , and Communicating with other healthcare professionals .    **Please Note: This note may have been constructed using a voice recognition system**

## 2025-02-21 NOTE — ASSESSMENT & PLAN NOTE
Patient having acute mental status changes 1/31/2025  UA positive-see plan for acute cystitis without hematuria  Transferred to [facility] 1/31/2025 for stat head CT-negative for acute intracranial abnormality  Per family, patient had confusion with Oxy in the past, this was discontinued  Alert and oriented today x 3  Transitioned to comfort measures only 2/6/2025  Discharging to SNF today as arranged by case management

## 2025-02-21 NOTE — NURSING NOTE
Called siobhan and gave report to alka. All questions answered. Patient left stretcher with transport team

## 2025-02-21 NOTE — ASSESSMENT & PLAN NOTE
Patient on Lasix at home and was requiring blood pressure support with midodrine so Lasix could be given   Appreciate nephrology who followed  Lasix discontinued on 2/6 when transitioned to comfort care  No further labs testing or treatments  Discharging to SNF today as arranged by case management

## 2025-02-21 NOTE — ASSESSMENT & PLAN NOTE
Due to acute cystitis  CT head negative  Transitioned to comfort measures only 2/6/2025  Discharging to SNF today as arranged by case management.

## 2025-02-21 NOTE — PLAN OF CARE
Problem: Prexisting or High Potential for Compromised Skin Integrity  Goal: Skin integrity is maintained or improved  Description: INTERVENTIONS:  - Identify patients at risk for skin breakdown  - Assess and monitor skin integrity  - Assess and monitor nutrition and hydration status  - Monitor labs   - Assess for incontinence   - Turn and reposition patient  - Assist with mobility/ambulation  - Relieve pressure over bony prominences  - Avoid friction and shearing  - Provide appropriate hygiene as needed including keeping skin clean and dry  - Evaluate need for skin moisturizer/barrier cream  - Collaborate with interdisciplinary team   - Patient/family teaching  - Consider wound care consult   Outcome: Progressing     Problem: PAIN - ADULT  Goal: Verbalizes/displays adequate comfort level or baseline comfort level  Description: Interventions:  - Encourage patient to monitor pain and request assistance  - Assess pain using appropriate pain scale  - Administer analgesics based on type and severity of pain and evaluate response  - Implement non-pharmacological measures as appropriate and evaluate response  - Consider cultural and social influences on pain and pain management  - Notify physician/advanced practitioner if interventions unsuccessful or patient reports new pain  Outcome: Progressing     Problem: INFECTION - ADULT  Goal: Absence or prevention of progression during hospitalization  Description: INTERVENTIONS:  - Assess and monitor for signs and symptoms of infection  - Monitor lab/diagnostic results  - Monitor all insertion sites, i.e. indwelling lines, tubes, and drains  - Monitor endotracheal if appropriate and nasal secretions for changes in amount and color  - [place] appropriate cooling/warming therapies per order  - Administer medications as ordered  - Instruct and encourage patient and family to use good hand hygiene technique  - Identify and instruct in appropriate isolation precautions for  identified infection/condition  Outcome: Progressing     Problem: SAFETY ADULT  Goal: Maintain or return to baseline ADL function  Description: INTERVENTIONS:  -  Assess patient's ability to carry out ADLs; assess patient's baseline for ADL function and identify physical deficits which impact ability to perform ADLs (bathing, care of mouth/teeth, toileting, grooming, dressing, etc.)  - Assess/evaluate cause of self-care deficits   - Assess range of motion  - Assess patient's mobility; develop plan if impaired  - Assess patient's need for assistive devices and provide as appropriate  - Encourage maximum independence but intervene and supervise when necessary  - Involve family in performance of ADLs  - Assess for home care needs following discharge   - Consider OT consult to assist with ADL evaluation and planning for discharge  - Provide patient education as appropriate  Outcome: Progressing  Goal: Maintains/Returns to pre admission functional level  Description: INTERVENTIONS:  - Perform AM-PAC 6 Click Basic Mobility/ Daily Activity assessment daily.  - Set and communicate daily mobility goal to care team and patient/family/caregiver.   - Collaborate with rehabilitation services on mobility goals if consulted  - Perform Range of Motion 3 times a day.  - Reposition patient every 2 hours.  - Record patient progress and toleration of activity level   Outcome: Progressing     Problem: DISCHARGE PLANNING  Goal: Discharge to home or other facility with appropriate resources  Description: INTERVENTIONS:  - Identify barriers to discharge w/patient and caregiver  - Arrange for needed discharge resources and transportation as appropriate  - Identify discharge learning needs (meds, wound care, etc.)  - Arrange for interpretive services to assist at discharge as needed  - Refer to Case Management Department for coordinating discharge planning if the patient needs post-hospital services based on physician/advanced practitioner  order or complex needs related to functional status, cognitive ability, or social support system  Outcome: Progressing     Problem: Knowledge Deficit  Goal: Patient/family/caregiver demonstrates understanding of disease process, treatment plan, medications, and discharge instructions  Description: Complete learning assessment and assess knowledge base.  Interventions:  - Provide teaching at level of understanding  - Provide teaching via preferred learning methods  Outcome: Progressing     Problem: GASTROINTESTINAL - ADULT  Goal: Maintains or returns to baseline bowel function  Description: INTERVENTIONS:  - Assess bowel function  - Encourage oral fluids to ensure adequate hydration  - Administer IV fluids if ordered to ensure adequate hydration  - Administer ordered medications as needed  - Encourage mobilization and activity  - Consider nutritional services referral to assist patient with adequate nutrition and appropriate food choices  Outcome: Progressing     Problem: COPING  Goal: Pt/Family able to verbalize concerns and demonstrate effective coping strategies  Description: INTERVENTIONS:  - Assist patient/family to identify coping skills, available support systems and cultural and spiritual values  - Provide emotional support, including active listening and acknowledgement of concerns of patient and caregivers  - Reduce environmental stimuli, as able  - Provide patient education  - Assess for spiritual pain/suffering and initiate spiritual care, including notification of Pastoral Care or daryl based community as needed  - Assess effectiveness of coping strategies  Outcome: Progressing

## 2025-02-21 NOTE — ASSESSMENT & PLAN NOTE
Goals of care discussion initiated  Patient has developed encephalopathy and bradycardia  Hospice evaluation appreciated   Case management making referrals for SNF on hospice.  Search has been expanded  Patient has transitioned to comfort measures only 2/6/2025  Discharge to SNF today as arranged by case management

## 2025-02-21 NOTE — ASSESSMENT & PLAN NOTE
UA positive  S/P treatment with cefepime and vancomycin  Infectious disease input appreciated   No urinary symptoms  Transitioned to comfort measures only 2/6/2025   Discharging to SNF today as arranged by case management

## 2025-02-21 NOTE — ASSESSMENT & PLAN NOTE
Wt Readings from Last 3 Encounters:   02/06/25 (!) 208 kg (458 lb 1.9 oz)   01/17/25 (!) 205 kg (451 lb)   01/01/25 (!) 205 kg (451 lb 14.4 oz)     11/7/2024 TTE: LVEF 60% with normal systolic function  Outpatient regimen included furosemide 60 mg twice daily   Nonadherent with outpatient Lasix due to hypotension  Nephrology input appreciated   Lasix discontinued on 2/6 when transitioned to comfort measures  Transitioned to comfort measures only 2/6/2025  Discharging to SNF today as arranged by case management

## 2025-02-22 NOTE — CASE MANAGEMENT
Case Management Discharge Planning Note    Patient name Zee Kirk  Location /-01 MRN [id number]  : 1950 Date 2025       Current Admission Date: 2025  Current Admission Diagnosis:Comfort measures only status   Patient Active Problem List    Diagnosis Date Noted Date Diagnosed    Comfort measures only status 2025     Bradycardia 2025     Stage 3a chronic kidney disease ([facility]) 2025     Metabolic encephalopathy 2025     Acute cystitis without hematuria 2025     Change in mental status 2025     Goals of care, counseling/discussion 2025     Bilateral lower extremity edema 2024     Hypotension 2024     Weakness 2024     Paroxysmal A-fib ([facility]) 2024     History of kidney stones 2024     Skin abnormalities 2024     Cataract, nuclear sclerotic senile, right 2024     Chronic heart failure with preserved ejection fraction ([facility]) 2024     Hypoxic episode 2024     Type 2 diabetes mellitus without complication, without long-term current use of insulin ([facility]) 2024     Long term (current) use of anticoagulants 2024     Subtherapeutic international normalized ratio (INR) 2024     Multiple open wounds 2024     Left leg pain 2023     Shingles 2023     Cellulitis- Ruled Out 2023     Longstanding persistent atrial fibrillation ([facility]) 2022     History of Clostridioides difficile infection 2022     Hypothyroid 10/03/2020     Mild episode of recurrent major depressive disorder ([facility]) 10/03/2020     Idiopathic chronic 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 10/03/2020     Class 3 severe obesity due to excess calories with serious comorbidity and body mass index (BMI) greater than or equal to 70 in adult ([facility]) 10/03/2020     Panniculitis 10/03/2020     Gastroesophageal reflux disease without esophagitis 10/03/2020     Hx MRSA infection 2020     Impaired  mobility 02/03/2019     Osteoarthritis of glenohumeral joint 02/03/2019     Left ovarian cyst 09/20/2017     Depression with anxiety 07/15/2017     Anemia 12/28/2016     LARRY (acute kidney injury) (HCC) 12/27/2016     Adjustment disorder 09/05/2013     Irritable bowel syndrome 12/04/2012     Left atrial enlargement 12/04/2012     Left ventricular hypertrophy 12/04/2012     Carpal tunnel syndrome 05/30/2012     Gout 05/30/2012     Hyperlipidemia 05/30/2012     Symptomatic menopausal or female climacteric states 05/30/2012       LOS (days): 25  Geometric Mean LOS (GMLOS) (days): 4.4  Days to GMLOS:-20.6     OBJECTIVE:  Risk of Unplanned Readmission Score: 33.99         Current admission status: Inpatient   Preferred Pharmacy:   [facility] - [facility]  [place] [address]  Phone: [phone] Fax: [phone]    Primary Care Provider: Franklyn Caruso MD    Primary Insurance: MEDICARE  Secondary Insurance: [facility]    DISCHARGE DETAILS:    Discharge planning discussed with:: patient & Rachelle was called at  10:58 am & 11:25 am & 14: 38  and cm called Robbie at 11:11 am LM and he called back at 12:08 pm & Georgiana was called with the change in time of the transport  Freedom of Choice: Yes  Comments - Freedom of Choice: pt is able to go to [place] on  comfort care and then she will be placed on hospice care - pt & family were made aware- cm sent a message to [facility] hospice- snf is ordering the dme equipment  CM contacted family/caregiver?: Yes  Were Treatment Team discharge recommendations reviewed with patient/caregiver?: Yes  Did patient/caregiver verbalize understanding of patient care needs?: Yes  Were patient/caregiver advised of the risks associated with not following Treatment Team discharge recommendations?: Yes    Contacts  Patient Contacts: Rachelle Richmond  and Robbie Richmond  Relationship to Patient:: Family (niece & nephew)  Contact Method:  Phone  Phone Number: [phone]  Rachelle  & [phone]  nephew  Reason/Outcome: Discharge Planning    Requested Home Health Care         Is the patient interested in HHC at discharge?: No    DME Referral Provided  Referral made for DME?: No    Other Referral/Resources/Interventions Provided:  Interventions: SNF  Referral Comments: pt accepted to [place] on comfort care and then she will be placed on hospice caree- cm made Cheryl at [facility] aware- snf will order the needed bariatric dme - cm was asked to setup a late transport - cm had a 5pm transport and it needed ti be changed to 4pm - cm called Joyce spoke to Georgiana and cm was told it was ok to cahnge the time to 4pm  - pt & family, provider & rn were made aware of the change- rn was given the report & fax #   rx is needed for narcotics- no covid needed    Would you like to participate in our [facility] service program?  : No - Declined    Treatment Team Recommendation: SNF ([place] on comfort care - 16:00pm [facility])  Discharge Destination Plan:: SNF ([place] on comfort care- 16:00pm [facility])  Transport at Discharge : [facility]        Transported by (Company and Unit #): [employer]  ETA of Transport (Date): 02/21/25  ETA of Transport (Time): 1600      Pt & family are in agreemenrt with the d/c & d/c plan              Family notified:: niece & nephew       Accepting Facility Name, City & State : [address]  Receiving Facility/Agency Phone Number: [phone]  Facility/Agency Fax Number: [phone]

## 2025-02-27 ENCOUNTER — ANTICOAG VISIT (OUTPATIENT)
Dept: CARDIOLOGY CLINIC | Facility: CLINIC | Age: 75
End: 2025-02-27

## 2025-02-27 NOTE — PROGRESS NOTES
Patient was recently hospitalized, discharged 2/21/2025 on comfort care.  According to discharge summary, patient was previously on warfarin which was discontinued 2/6/2025 after discussion with the patient and her family during her hospitalization.

## 2025-03-03 ENCOUNTER — HOSPICE ADMISSION (OUTPATIENT)
Dept: HOME HOSPICE | Facility: HOSPICE | Age: 75
End: 2025-03-03
Payer: MEDICARE

## 2025-03-03 ENCOUNTER — HOME CARE VISIT (OUTPATIENT)
Dept: HOME HEALTH SERVICES | Facility: HOME HEALTHCARE | Age: 75
End: 2025-03-03
Payer: MEDICARE

## 2025-03-03 PROBLEM — N30.00 ACUTE CYSTITIS WITHOUT HEMATURIA: Status: RESOLVED | Noted: 2025-02-01 | Resolved: 2025-03-03

## 2025-03-04 ENCOUNTER — HOME CARE VISIT (OUTPATIENT)
Dept: HOME HEALTH SERVICES | Facility: HOME HEALTHCARE | Age: 75
End: 2025-03-04
Payer: MEDICARE

## 2025-03-04 VITALS
HEIGHT: 63 IN | SYSTOLIC BLOOD PRESSURE: 93 MMHG | DIASTOLIC BLOOD PRESSURE: 66 MMHG | HEART RATE: 60 BPM | RESPIRATION RATE: 18 BRPM | BODY MASS INDEX: 51.91 KG/M2 | TEMPERATURE: 97.2 F | WEIGHT: 293 LBS

## 2025-03-04 DIAGNOSIS — Z51.5 COMFORT MEASURES ONLY STATUS: ICD-10-CM

## 2025-03-04 PROCEDURE — G0299 HHS/HOSPICE OF RN EA 15 MIN: HCPCS

## 2025-03-04 PROCEDURE — 10330057 MEDICATION, GENERAL

## 2025-03-04 RX ORDER — LORAZEPAM 0.5 MG/1
TABLET ORAL
Qty: 30 TABLET | Refills: 0 | Status: SHIPPED | OUTPATIENT
Start: 2025-03-04

## 2025-03-05 ENCOUNTER — HOME CARE VISIT (OUTPATIENT)
Dept: HOME HOSPICE | Facility: HOSPICE | Age: 75
End: 2025-03-05
Payer: MEDICARE

## 2025-03-05 ENCOUNTER — HOME CARE VISIT (OUTPATIENT)
Dept: HOME HEALTH SERVICES | Facility: HOME HEALTHCARE | Age: 75
End: 2025-03-05
Payer: MEDICARE

## 2025-03-05 PROCEDURE — G0155 HHCP-SVS OF CSW,EA 15 MIN: HCPCS

## 2025-03-05 PROCEDURE — 10330057 MEDICATION, GENERAL

## 2025-03-05 PROCEDURE — G0299 HHS/HOSPICE OF RN EA 15 MIN: HCPCS

## 2025-03-05 PROCEDURE — G0156 HHCP-SVS OF AIDE,EA 15 MIN: HCPCS

## 2025-03-06 ENCOUNTER — HOME CARE VISIT (OUTPATIENT)
Dept: HOME HEALTH SERVICES | Facility: HOME HEALTHCARE | Age: 75
End: 2025-03-06
Payer: MEDICARE

## 2025-03-06 VITALS
RESPIRATION RATE: 18 BRPM | SYSTOLIC BLOOD PRESSURE: 128 MMHG | DIASTOLIC BLOOD PRESSURE: 68 MMHG | TEMPERATURE: 97.7 F | HEART RATE: 53 BPM

## 2025-03-06 PROCEDURE — G0156 HHCP-SVS OF AIDE,EA 15 MIN: HCPCS

## 2025-03-06 PROCEDURE — G0151 HHCP-SERV OF PT,EA 15 MIN: HCPCS

## 2025-03-06 PROCEDURE — G0299 HHS/HOSPICE OF RN EA 15 MIN: HCPCS

## 2025-03-06 PROCEDURE — 10330057 MEDICATION, GENERAL

## 2025-03-06 PROCEDURE — G0155 HHCP-SVS OF CSW,EA 15 MIN: HCPCS

## 2025-03-07 ENCOUNTER — HOME CARE VISIT (OUTPATIENT)
Dept: HOME HEALTH SERVICES | Facility: HOME HEALTHCARE | Age: 75
End: 2025-03-07
Payer: MEDICARE

## 2025-03-07 PROCEDURE — G0156 HHCP-SVS OF AIDE,EA 15 MIN: HCPCS

## 2025-03-07 PROCEDURE — G0299 HHS/HOSPICE OF RN EA 15 MIN: HCPCS

## 2025-03-09 ENCOUNTER — HOME CARE VISIT (OUTPATIENT)
Dept: HOME HOSPICE | Facility: HOSPICE | Age: 75
End: 2025-03-09
Payer: MEDICARE

## 2025-03-10 ENCOUNTER — HOME CARE VISIT (OUTPATIENT)
Dept: HOME HEALTH SERVICES | Facility: HOME HEALTHCARE | Age: 75
End: 2025-03-10
Payer: MEDICARE

## 2025-03-10 ENCOUNTER — HOME CARE VISIT (OUTPATIENT)
Dept: HOME HOSPICE | Facility: HOSPICE | Age: 75
End: 2025-03-10
Payer: MEDICARE

## 2025-03-10 VITALS — HEART RATE: 58 BPM | SYSTOLIC BLOOD PRESSURE: 134 MMHG | DIASTOLIC BLOOD PRESSURE: 77 MMHG

## 2025-03-10 PROCEDURE — G0156 HHCP-SVS OF AIDE,EA 15 MIN: HCPCS

## 2025-03-10 PROCEDURE — G0299 HHS/HOSPICE OF RN EA 15 MIN: HCPCS

## 2025-03-11 ENCOUNTER — HOME CARE VISIT (OUTPATIENT)
Dept: HOME HEALTH SERVICES | Facility: HOME HEALTHCARE | Age: 75
End: 2025-03-11
Payer: MEDICARE

## 2025-03-11 ENCOUNTER — HOME CARE VISIT (OUTPATIENT)
Dept: HOME HOSPICE | Facility: HOSPICE | Age: 75
End: 2025-03-11
Payer: MEDICARE

## 2025-03-11 VITALS — HEART RATE: 64 BPM | DIASTOLIC BLOOD PRESSURE: 75 MMHG | SYSTOLIC BLOOD PRESSURE: 137 MMHG | TEMPERATURE: 97.3 F

## 2025-03-11 VITALS
SYSTOLIC BLOOD PRESSURE: 131 MMHG | TEMPERATURE: 98.8 F | RESPIRATION RATE: 18 BRPM | DIASTOLIC BLOOD PRESSURE: 70 MMHG | HEART RATE: 68 BPM

## 2025-03-11 PROCEDURE — G0155 HHCP-SVS OF CSW,EA 15 MIN: HCPCS

## 2025-03-11 PROCEDURE — G0156 HHCP-SVS OF AIDE,EA 15 MIN: HCPCS

## 2025-03-11 PROCEDURE — G0299 HHS/HOSPICE OF RN EA 15 MIN: HCPCS
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
## 2025-03-12 ENCOUNTER — HOME CARE VISIT (OUTPATIENT)
Dept: HOME HOSPICE | Facility: HOSPICE | Age: 75
End: 2025-03-12
Payer: MEDICARE

## 2025-03-12 ENCOUNTER — HOME CARE VISIT (OUTPATIENT)
Dept: HOME HEALTH SERVICES | Facility: HOME HEALTHCARE | Age: 75
End: 2025-03-12
Payer: MEDICARE

## 2025-03-12 PROCEDURE — G0156 HHCP-SVS OF AIDE,EA 15 MIN: HCPCS

## 2025-03-13 ENCOUNTER — HOME CARE VISIT (OUTPATIENT)
Dept: HOME HEALTH SERVICES | Facility: HOME HEALTHCARE | Age: 75
End: 2025-03-13
Payer: MEDICARE

## 2025-03-13 ENCOUNTER — HOME CARE VISIT (OUTPATIENT)
Dept: HOME HOSPICE | Facility: HOSPICE | Age: 75
End: 2025-03-13
Payer: MEDICARE

## 2025-03-13 PROCEDURE — G0156 HHCP-SVS OF AIDE,EA 15 MIN: HCPCS

## 2025-03-13 PROCEDURE — 10330057 MEDICATION, GENERAL

## 2025-03-13 PROCEDURE — G0299 HHS/HOSPICE OF RN EA 15 MIN: HCPCS

## 2025-03-14 ENCOUNTER — HOME CARE VISIT (OUTPATIENT)
Dept: HOME HEALTH SERVICES | Facility: HOME HEALTHCARE | Age: 75
End: 2025-03-14
Payer: MEDICARE

## 2025-03-14 VITALS
RESPIRATION RATE: 18 BRPM | DIASTOLIC BLOOD PRESSURE: 67 MMHG | HEART RATE: 55 BPM | SYSTOLIC BLOOD PRESSURE: 114 MMHG | TEMPERATURE: 98.1 F

## 2025-03-14 PROCEDURE — G0156 HHCP-SVS OF AIDE,EA 15 MIN: HCPCS

## 2025-03-17 ENCOUNTER — HOME CARE VISIT (OUTPATIENT)
Dept: HOME HOSPICE | Facility: HOSPICE | Age: 75
End: 2025-03-17
Payer: MEDICARE

## 2025-03-17 ENCOUNTER — HOME CARE VISIT (OUTPATIENT)
Dept: HOME HEALTH SERVICES | Facility: HOME HEALTHCARE | Age: 75
End: 2025-03-17
Payer: MEDICARE

## 2025-03-17 PROCEDURE — G0156 HHCP-SVS OF AIDE,EA 15 MIN: HCPCS

## 2025-03-18 ENCOUNTER — HOME CARE VISIT (OUTPATIENT)
Dept: HOME HOSPICE | Facility: HOSPICE | Age: 75
End: 2025-03-18
Payer: MEDICARE

## 2025-03-18 ENCOUNTER — HOME CARE VISIT (OUTPATIENT)
Dept: HOME HEALTH SERVICES | Facility: HOME HEALTHCARE | Age: 75
End: 2025-03-18
Payer: MEDICARE

## 2025-03-18 PROCEDURE — G0156 HHCP-SVS OF AIDE,EA 15 MIN: HCPCS

## 2025-03-18 PROCEDURE — 10330057 MEDICATION, GENERAL

## 2025-03-18 PROCEDURE — G0299 HHS/HOSPICE OF RN EA 15 MIN: HCPCS

## 2025-03-19 ENCOUNTER — HOME CARE VISIT (OUTPATIENT)
Dept: HOME HEALTH SERVICES | Facility: HOME HEALTHCARE | Age: 75
End: 2025-03-19
Payer: MEDICARE

## 2025-03-19 PROCEDURE — G0156 HHCP-SVS OF AIDE,EA 15 MIN: HCPCS

## 2025-03-20 ENCOUNTER — HOME CARE VISIT (OUTPATIENT)
Dept: HOME HEALTH SERVICES | Facility: HOME HEALTHCARE | Age: 75
End: 2025-03-20
Payer: MEDICARE

## 2025-03-20 PROCEDURE — G0299 HHS/HOSPICE OF RN EA 15 MIN: HCPCS

## 2025-03-20 PROCEDURE — G0156 HHCP-SVS OF AIDE,EA 15 MIN: HCPCS

## 2025-03-21 ENCOUNTER — HOME CARE VISIT (OUTPATIENT)
Dept: HOME HEALTH SERVICES | Facility: HOME HEALTHCARE | Age: 75
End: 2025-03-21
Payer: MEDICARE

## 2025-03-21 PROCEDURE — G0156 HHCP-SVS OF AIDE,EA 15 MIN: HCPCS

## 2025-03-23 VITALS — HEART RATE: 56 BPM | SYSTOLIC BLOOD PRESSURE: 140 MMHG | DIASTOLIC BLOOD PRESSURE: 61 MMHG | RESPIRATION RATE: 18 BRPM

## 2025-03-24 ENCOUNTER — HOME CARE VISIT (OUTPATIENT)
Dept: HOME HEALTH SERVICES | Facility: HOME HEALTHCARE | Age: 75
End: 2025-03-24
Payer: MEDICARE

## 2025-03-24 ENCOUNTER — HOME CARE VISIT (OUTPATIENT)
Dept: HOME HOSPICE | Facility: HOSPICE | Age: 75
End: 2025-03-24
Payer: MEDICARE

## 2025-03-24 PROCEDURE — G0156 HHCP-SVS OF AIDE,EA 15 MIN: HCPCS

## 2025-03-24 PROCEDURE — G0299 HHS/HOSPICE OF RN EA 15 MIN: HCPCS

## 2025-03-25 ENCOUNTER — HOME CARE VISIT (OUTPATIENT)
Dept: HOME HEALTH SERVICES | Facility: HOME HEALTHCARE | Age: 75
End: 2025-03-25
Payer: MEDICARE

## 2025-03-25 ENCOUNTER — HOME CARE VISIT (OUTPATIENT)
Dept: HOME HOSPICE | Facility: HOSPICE | Age: 75
End: 2025-03-25
Payer: MEDICARE

## 2025-03-25 PROCEDURE — G0156 HHCP-SVS OF AIDE,EA 15 MIN: HCPCS

## 2025-03-26 ENCOUNTER — HOME CARE VISIT (OUTPATIENT)
Dept: HOME HEALTH SERVICES | Facility: HOME HEALTHCARE | Age: 75
End: 2025-03-26
Payer: MEDICARE

## 2025-03-26 VITALS
TEMPERATURE: 98.1 F | SYSTOLIC BLOOD PRESSURE: 137 MMHG | DIASTOLIC BLOOD PRESSURE: 72 MMHG | RESPIRATION RATE: 18 BRPM | HEART RATE: 52 BPM

## 2025-03-26 PROCEDURE — G0156 HHCP-SVS OF AIDE,EA 15 MIN: HCPCS

## 2025-03-27 ENCOUNTER — HOME CARE VISIT (OUTPATIENT)
Dept: HOME HOSPICE | Facility: HOSPICE | Age: 75
End: 2025-03-27
Payer: MEDICARE

## 2025-03-27 ENCOUNTER — HOME CARE VISIT (OUTPATIENT)
Dept: HOME HEALTH SERVICES | Facility: HOME HEALTHCARE | Age: 75
End: 2025-03-27
Payer: MEDICARE

## 2025-03-27 PROCEDURE — G0156 HHCP-SVS OF AIDE,EA 15 MIN: HCPCS

## 2025-03-27 PROCEDURE — 10330057 MEDICATION, GENERAL

## 2025-03-28 ENCOUNTER — HOME CARE VISIT (OUTPATIENT)
Dept: HOME HOSPICE | Facility: HOSPICE | Age: 75
End: 2025-03-28
Payer: MEDICARE

## 2025-03-28 ENCOUNTER — HOME CARE VISIT (OUTPATIENT)
Dept: HOME HEALTH SERVICES | Facility: HOME HEALTHCARE | Age: 75
End: 2025-03-28
Payer: MEDICARE

## 2025-03-28 PROCEDURE — G0156 HHCP-SVS OF AIDE,EA 15 MIN: HCPCS

## 2025-03-28 PROCEDURE — G0299 HHS/HOSPICE OF RN EA 15 MIN: HCPCS

## 2025-03-29 VITALS
OXYGEN SATURATION: 94 % | TEMPERATURE: 97.9 F | HEART RATE: 58 BPM | RESPIRATION RATE: 18 BRPM | DIASTOLIC BLOOD PRESSURE: 76 MMHG | SYSTOLIC BLOOD PRESSURE: 139 MMHG

## 2025-03-31 ENCOUNTER — HOME CARE VISIT (OUTPATIENT)
Dept: HOME HEALTH SERVICES | Facility: HOME HEALTHCARE | Age: 75
End: 2025-03-31
Payer: MEDICARE

## 2025-03-31 PROCEDURE — G0156 HHCP-SVS OF AIDE,EA 15 MIN: HCPCS

## 2025-04-01 ENCOUNTER — HOME CARE VISIT (OUTPATIENT)
Dept: HOME HEALTH SERVICES | Facility: HOME HEALTHCARE | Age: 75
End: 2025-04-01
Payer: MEDICARE

## 2025-04-01 PROCEDURE — G0156 HHCP-SVS OF AIDE,EA 15 MIN: HCPCS

## 2025-04-01 PROCEDURE — 10330057 MEDICATION, GENERAL

## 2025-04-01 PROCEDURE — G0299 HHS/HOSPICE OF RN EA 15 MIN: HCPCS

## 2025-04-02 ENCOUNTER — HOME CARE VISIT (OUTPATIENT)
Dept: HOME HEALTH SERVICES | Facility: HOME HEALTHCARE | Age: 75
End: 2025-04-02
Payer: MEDICARE

## 2025-04-02 PROCEDURE — G0156 HHCP-SVS OF AIDE,EA 15 MIN: HCPCS

## 2025-04-03 ENCOUNTER — HOME CARE VISIT (OUTPATIENT)
Dept: HOME HEALTH SERVICES | Facility: HOME HEALTHCARE | Age: 75
End: 2025-04-03
Payer: MEDICARE

## 2025-04-03 PROCEDURE — G0156 HHCP-SVS OF AIDE,EA 15 MIN: HCPCS

## 2025-04-03 PROCEDURE — G0299 HHS/HOSPICE OF RN EA 15 MIN: HCPCS

## 2025-04-04 ENCOUNTER — HOME CARE VISIT (OUTPATIENT)
Dept: HOME HEALTH SERVICES | Facility: HOME HEALTHCARE | Age: 75
End: 2025-04-04
Payer: MEDICARE

## 2025-04-04 PROCEDURE — G0156 HHCP-SVS OF AIDE,EA 15 MIN: HCPCS

## 2025-04-07 ENCOUNTER — TELEPHONE (OUTPATIENT)
Dept: HOME HEALTH SERVICES | Facility: HOME HEALTHCARE | Age: 75
End: 2025-04-07

## 2025-04-07 VITALS
OXYGEN SATURATION: 97 % | SYSTOLIC BLOOD PRESSURE: 114 MMHG | TEMPERATURE: 97.5 F | HEART RATE: 60 BPM | HEART RATE: 57 BPM | SYSTOLIC BLOOD PRESSURE: 137 MMHG | RESPIRATION RATE: 20 BRPM | RESPIRATION RATE: 18 BRPM | DIASTOLIC BLOOD PRESSURE: 64 MMHG | DIASTOLIC BLOOD PRESSURE: 64 MMHG | TEMPERATURE: 98.4 F

## 2025-04-08 ENCOUNTER — HOME CARE VISIT (OUTPATIENT)
Dept: HOME HEALTH SERVICES | Facility: HOME HEALTHCARE | Age: 75
End: 2025-04-08
Payer: MEDICARE

## 2025-04-08 ENCOUNTER — HOME CARE VISIT (OUTPATIENT)
Dept: HOME HOSPICE | Facility: HOSPICE | Age: 75
End: 2025-04-08
Payer: MEDICARE

## 2025-04-08 PROCEDURE — G0156 HHCP-SVS OF AIDE,EA 15 MIN: HCPCS

## 2025-04-08 PROCEDURE — G0299 HHS/HOSPICE OF RN EA 15 MIN: HCPCS

## 2025-04-09 ENCOUNTER — HOME CARE VISIT (OUTPATIENT)
Dept: HOME HEALTH SERVICES | Facility: HOME HEALTHCARE | Age: 75
End: 2025-04-09
Payer: MEDICARE

## 2025-04-09 VITALS
RESPIRATION RATE: 18 BRPM | HEART RATE: 60 BPM | DIASTOLIC BLOOD PRESSURE: 90 MMHG | TEMPERATURE: 98.4 F | SYSTOLIC BLOOD PRESSURE: 126 MMHG

## 2025-04-09 PROCEDURE — G0156 HHCP-SVS OF AIDE,EA 15 MIN: HCPCS

## 2025-04-10 ENCOUNTER — HOME CARE VISIT (OUTPATIENT)
Dept: HOME HEALTH SERVICES | Facility: HOME HEALTHCARE | Age: 75
End: 2025-04-10
Payer: MEDICARE

## 2025-04-10 VITALS
TEMPERATURE: 97.9 F | RESPIRATION RATE: 18 BRPM | DIASTOLIC BLOOD PRESSURE: 60 MMHG | SYSTOLIC BLOOD PRESSURE: 128 MMHG | HEART RATE: 52 BPM

## 2025-04-10 PROCEDURE — G0299 HHS/HOSPICE OF RN EA 15 MIN: HCPCS

## 2025-04-10 PROCEDURE — G0156 HHCP-SVS OF AIDE,EA 15 MIN: HCPCS

## 2025-04-11 ENCOUNTER — HOME CARE VISIT (OUTPATIENT)
Dept: HOME HEALTH SERVICES | Facility: HOME HEALTHCARE | Age: 75
End: 2025-04-11
Payer: MEDICARE

## 2025-04-11 PROCEDURE — G0156 HHCP-SVS OF AIDE,EA 15 MIN: HCPCS

## 2025-04-13 ENCOUNTER — HOME CARE VISIT (OUTPATIENT)
Dept: HOME HOSPICE | Facility: HOSPICE | Age: 75
End: 2025-04-13
Payer: MEDICARE

## 2025-04-14 ENCOUNTER — HOME CARE VISIT (OUTPATIENT)
Dept: HOME HEALTH SERVICES | Facility: HOME HEALTHCARE | Age: 75
End: 2025-04-14
Payer: MEDICARE

## 2025-04-14 ENCOUNTER — HOME CARE VISIT (OUTPATIENT)
Dept: HOME HOSPICE | Facility: HOSPICE | Age: 75
End: 2025-04-14
Payer: MEDICARE

## 2025-04-14 PROCEDURE — G0155 HHCP-SVS OF CSW,EA 15 MIN: HCPCS

## 2025-04-14 PROCEDURE — G0156 HHCP-SVS OF AIDE,EA 15 MIN: HCPCS

## 2025-04-15 ENCOUNTER — HOME CARE VISIT (OUTPATIENT)
Dept: HOME HEALTH SERVICES | Facility: HOME HEALTHCARE | Age: 75
End: 2025-04-15
Payer: MEDICARE

## 2025-04-15 VITALS
SYSTOLIC BLOOD PRESSURE: 104 MMHG | DIASTOLIC BLOOD PRESSURE: 50 MMHG | TEMPERATURE: 98.8 F | HEART RATE: 62 BPM | RESPIRATION RATE: 20 BRPM

## 2025-04-15 PROCEDURE — G0156 HHCP-SVS OF AIDE,EA 15 MIN: HCPCS

## 2025-04-15 PROCEDURE — G0299 HHS/HOSPICE OF RN EA 15 MIN: HCPCS

## 2025-04-16 ENCOUNTER — HOME CARE VISIT (OUTPATIENT)
Dept: HOME HOSPICE | Facility: HOSPICE | Age: 75
End: 2025-04-16
Payer: MEDICARE

## 2025-04-16 ENCOUNTER — HOME CARE VISIT (OUTPATIENT)
Dept: HOME HEALTH SERVICES | Facility: HOME HEALTHCARE | Age: 75
End: 2025-04-16
Payer: MEDICARE

## 2025-04-16 PROCEDURE — G0156 HHCP-SVS OF AIDE,EA 15 MIN: HCPCS

## 2025-04-17 ENCOUNTER — HOME CARE VISIT (OUTPATIENT)
Dept: HOME HEALTH SERVICES | Facility: HOME HEALTHCARE | Age: 75
End: 2025-04-17
Payer: MEDICARE

## 2025-04-17 PROCEDURE — G0156 HHCP-SVS OF AIDE,EA 15 MIN: HCPCS

## 2025-04-18 ENCOUNTER — HOME CARE VISIT (OUTPATIENT)
Dept: HOME HEALTH SERVICES | Facility: HOME HEALTHCARE | Age: 75
End: 2025-04-18
Payer: MEDICARE

## 2025-04-18 PROCEDURE — G0156 HHCP-SVS OF AIDE,EA 15 MIN: HCPCS

## 2025-04-18 PROCEDURE — G0299 HHS/HOSPICE OF RN EA 15 MIN: HCPCS

## 2025-04-21 ENCOUNTER — HOME CARE VISIT (OUTPATIENT)
Dept: HOME HEALTH SERVICES | Facility: HOME HEALTHCARE | Age: 75
End: 2025-04-21
Payer: MEDICARE

## 2025-04-22 ENCOUNTER — HOME CARE VISIT (OUTPATIENT)
Dept: HOME HOSPICE | Facility: HOSPICE | Age: 75
End: 2025-04-22
Payer: MEDICARE

## 2025-04-22 ENCOUNTER — HOME CARE VISIT (OUTPATIENT)
Dept: HOME HEALTH SERVICES | Facility: HOME HEALTHCARE | Age: 75
End: 2025-04-22
Payer: MEDICARE

## 2025-04-22 PROCEDURE — G0299 HHS/HOSPICE OF RN EA 15 MIN: HCPCS

## 2025-04-25 ENCOUNTER — HOME CARE VISIT (OUTPATIENT)
Dept: HOME HEALTH SERVICES | Facility: HOME HEALTHCARE | Age: 75
End: 2025-04-25
Payer: MEDICARE

## 2025-04-25 ENCOUNTER — HOME CARE VISIT (OUTPATIENT)
Dept: HOME HOSPICE | Facility: HOSPICE | Age: 75
End: 2025-04-25
Payer: MEDICARE

## 2025-04-25 PROCEDURE — G0299 HHS/HOSPICE OF RN EA 15 MIN: HCPCS

## 2025-04-25 PROCEDURE — G0156 HHCP-SVS OF AIDE,EA 15 MIN: HCPCS

## 2025-04-26 ENCOUNTER — HOME CARE VISIT (OUTPATIENT)
Dept: HOME HEALTH SERVICES | Facility: HOME HEALTHCARE | Age: 75
End: 2025-04-26
Payer: MEDICARE

## 2025-04-26 ENCOUNTER — HOME CARE VISIT (OUTPATIENT)
Dept: HOME HOSPICE | Facility: HOSPICE | Age: 75
End: 2025-04-26
Payer: MEDICARE

## 2025-04-26 PROCEDURE — G0156 HHCP-SVS OF AIDE,EA 15 MIN: HCPCS

## 2025-04-28 ENCOUNTER — HOME CARE VISIT (OUTPATIENT)
Dept: HOME HEALTH SERVICES | Facility: HOME HEALTHCARE | Age: 75
End: 2025-04-28
Payer: MEDICARE

## 2025-04-28 ENCOUNTER — IN HOME VISIT (OUTPATIENT)
Dept: HOME HOSPICE | Facility: HOSPICE | Age: 75
End: 2025-04-28

## 2025-04-28 VITALS — SYSTOLIC BLOOD PRESSURE: 121 MMHG | HEART RATE: 66 BPM | DIASTOLIC BLOOD PRESSURE: 61 MMHG

## 2025-04-28 DIAGNOSIS — H25.13 AGE-RELATED NUCLEAR CATARACT OF BOTH EYES: Primary | ICD-10-CM

## 2025-04-28 DIAGNOSIS — Z71.89 COUNSELING REGARDING ADVANCE CARE PLANNING AND GOALS OF CARE: ICD-10-CM

## 2025-04-28 DIAGNOSIS — Z51.5 HOSPICE CARE: ICD-10-CM

## 2025-04-28 DIAGNOSIS — Z01.818 ENCOUNTER FOR PREOPERATIVE ASSESSMENT: ICD-10-CM

## 2025-04-28 PROCEDURE — G0299 HHS/HOSPICE OF RN EA 15 MIN: HCPCS

## 2025-04-28 PROCEDURE — 99345 HOME/RES VST NEW HIGH MDM 75: CPT | Performed by: STUDENT IN AN ORGANIZED HEALTH CARE EDUCATION/TRAINING PROGRAM

## 2025-04-28 PROCEDURE — G0156 HHCP-SVS OF AIDE,EA 15 MIN: HCPCS

## 2025-04-28 NOTE — PROGRESS NOTES
Name: Zee Kirk      : 1950      MRN: [id number]  Encounter Provider: Asya Lee MD  Encounter Date: 2025   Encounter department: [facility]  :  Assessment & Plan  Age-related nuclear cataract of both eyes  S/p R cataract removal 2024, cheduled for L cataract removal   Hold ASA, start tobramycin drops per ophtho       Encounter for preoperative assessment  Patient is above average risk for this extremely low risk procedure. We discussed risks and benefits and she wishes to proceed.       Counseling regarding advance care planning and goals of care  Maintaining independence is extremely important to her which she feels this procedure will allow. She wishes to maintain DNR/DNI status even in the perioperative setting. She is working to return home with waiver aides in addition to hospice services.  20 mins spent addressing GOC/ACP       Hospice care  Introduced myself as her hospice provider  Time spent developing patient/family-provider relationship, providing psychosocial support, and validating patient/family experience  Encouraged to contact hospice with any questions/concerns  Follow up as needed             Decisional apparatus: Patient is competent on my exam today. If competence is lost, patient's substitute decision maker would default to malini Bush by her advance directive.  Advance Directive / Living Will / POLST: POLST completed to be scanned into chart and brought with patient to hospital for procedure    PDMP Review: I have reviewed the patient's controlled substance dispensing history in the Prescription Drug Monitoring Program in compliance with the [place] regulations before prescribing any controlled substances.    History of Present Illness   Zee Kirk is a 74 y.o. female with chronic respiratory failure 2/2 CHF residing at [facility] for LTC while on home hospice. Home visit requested for GOC discussion and preoperative  assessment in the setting of planned upcoming cataract removal. She tolerated this procedure on her R eye without issue just a few months ago.      Objective   LMP  (LMP Unknown)     Physical Exam  Constitutional:       Comments: Lying in bed, no acute distress, chronically ill appearing   HENT:      Nose:      Comments: NC in place  Cardiovascular:      Rate and Rhythm: Normal rate and regular rhythm.      Heart sounds: No murmur heard.  Pulmonary:      Comments: CTA anteriorly  Abdominal:      General: There is no distension.      Palpations: Abdomen is soft.   Musculoskeletal:         General: No swelling or deformity.   Skin:     General: Skin is warm and dry.      Comments: 3-4 cm skin opening under the pannus without significant erythema or swelling   Neurological:      General: No focal deficit present.      Mental Status: She is alert.   Psychiatric:      Comments: Anxious, intermittently tearful         Recent labs:  Lab Results   Component Value Date/Time    SODIUM 141 02/05/2025 06:10 AM    SODIUM 143 02/29/2024 10:58 AM    K 3.8 02/05/2025 06:10 AM    K 4.5 02/29/2024 10:58 AM    BUN 40 (H) 02/05/2025 06:10 AM    BUN 32 (H) 02/29/2024 10:58 AM    CREATININE 1.62 (H) 02/05/2025 06:10 AM    CREATININE 1.05 02/29/2024 10:58 AM    GLUC 89 02/05/2025 06:10 AM    GLUC 106 (H) 02/29/2024 10:58 AM    CALCIUM 7.5 (L) 02/05/2025 06:10 AM    CALCIUM 8.8 02/29/2024 10:58 AM    AST 8 (L) 02/05/2025 06:10 AM    AST 16 02/29/2024 10:58 AM    ALT 4 (L) 02/05/2025 06:10 AM    ALT 10 02/29/2024 10:58 AM    ALB 2.8 (L) 02/05/2025 06:10 AM    ALB 3.0 (L) 02/29/2024 10:58 AM    TP 7.0 02/05/2025 06:10 AM    TP 7.3 02/29/2024 10:58 AM    EGFR 31 02/05/2025 06:10 AM    EGFR 56 (L) 02/29/2024 10:58 AM    EGFR 58 (L) 08/27/2020 11:14 AM     Lab Results   Component Value Date/Time    HGB 9.2 (L) 02/05/2025 06:10 AM    HGB 12.3 05/19/2018 06:15 PM    WBC 10.93 (H) 02/05/2025 06:10 AM    WBC 9.1 05/19/2018 06:15 PM      02/05/2025 06:10 AM     05/19/2018 06:15 PM    INR 2.80 (H) 02/06/2025 04:06 AM    INR 2.3 06/27/2024 10:50 AM    PTT 49 (H) 01/26/2025 02:16 PM    PTT 43.5 (H) 05/19/2018 06:15 PM     Lab Results   Component Value Date/Time    JJI1VCGLRORT 5.778 (H) 01/17/2025 07:15 PM       Recent Imaging:  Procedure: CT head wo contrast  Result Date: 2/1/2025  Impression: No acute intracranial hemorrhage, midline shift, or mass effect. Chronic small vessel ischemic changes. Workstation performed: [id number]     Procedure: CT abdomen pelvis with contrast  Result Date: 1/26/2025  Impression: 1.  Right nephrolithiasis with 6 mm calculus in the right renal pelvis. No hydronephrosis. 2.  Diverticulosis without evidence of diverticulitis. Workstation performed: [id number]     Procedure: XR chest 1 view  Result Date: 1/18/2025  Impression: No acute cardiopulmonary disease. Workstation performed: [id number]       Administrative Statements   I have spent a total time of 90 minutes in caring for this patient on the day of the visit/encounter including Diagnostic results, Prognosis, Risks and benefits of tx options, Instructions for management, Patient and family education, Importance of tx compliance, Risk factor reductions, Impressions, Counseling / Coordination of care, Documenting in the medical record, Reviewing/placing orders in the medical record (including tests, medications, and/or procedures), Obtaining or reviewing history  , and Communicating with other healthcare professionals .   Topics discussed with the patient / family include symptom assessment and management, medication review, medication adjustment, psychosocial support, advanced directives, goals of care, hospice services, supportive listening, and anticipatory guidance.

## 2025-04-29 ENCOUNTER — HOME CARE VISIT (OUTPATIENT)
Dept: HOME HEALTH SERVICES | Facility: HOME HEALTHCARE | Age: 75
End: 2025-04-29
Payer: MEDICARE

## 2025-04-29 PROCEDURE — G0156 HHCP-SVS OF AIDE,EA 15 MIN: HCPCS

## 2025-04-30 ENCOUNTER — HOME CARE VISIT (OUTPATIENT)
Dept: HOME HEALTH SERVICES | Facility: HOME HEALTHCARE | Age: 75
End: 2025-04-30
Payer: MEDICARE

## 2025-04-30 PROCEDURE — G0156 HHCP-SVS OF AIDE,EA 15 MIN: HCPCS

## 2025-04-30 PROCEDURE — G0299 HHS/HOSPICE OF RN EA 15 MIN: HCPCS

## 2025-04-30 NOTE — ASSESSMENT & PLAN NOTE
Maintaining independence is extremely important to her which she feels this procedure will allow. She wishes to maintain DNR/DNI status even in the perioperative setting. She is working to return home with waiver aides in addition to hospice services.  20 mins spent addressing GOC/ACP

## 2025-05-01 ENCOUNTER — HOME CARE VISIT (OUTPATIENT)
Dept: HOME HEALTH SERVICES | Facility: HOME HEALTHCARE | Age: 75
End: 2025-05-01
Payer: MEDICARE

## 2025-05-01 ENCOUNTER — HOME CARE VISIT (OUTPATIENT)
Dept: HOME HOSPICE | Facility: HOSPICE | Age: 75
End: 2025-05-01
Payer: MEDICARE

## 2025-05-01 VITALS
DIASTOLIC BLOOD PRESSURE: 55 MMHG | RESPIRATION RATE: 18 BRPM | HEART RATE: 59 BPM | SYSTOLIC BLOOD PRESSURE: 140 MMHG | TEMPERATURE: 97.4 F

## 2025-05-01 PROCEDURE — G0156 HHCP-SVS OF AIDE,EA 15 MIN: HCPCS

## 2025-05-01 PROCEDURE — G0299 HHS/HOSPICE OF RN EA 15 MIN: HCPCS

## 2025-05-02 ENCOUNTER — HOME CARE VISIT (OUTPATIENT)
Dept: HOME HEALTH SERVICES | Facility: HOME HEALTHCARE | Age: 75
End: 2025-05-02
Payer: MEDICARE

## 2025-05-02 ENCOUNTER — HOME CARE VISIT (OUTPATIENT)
Dept: HOME HOSPICE | Facility: HOSPICE | Age: 75
End: 2025-05-02
Payer: MEDICARE

## 2025-05-02 PROCEDURE — G0156 HHCP-SVS OF AIDE,EA 15 MIN: HCPCS

## 2025-05-03 ENCOUNTER — HOME CARE VISIT (OUTPATIENT)
Dept: HOME HOSPICE | Facility: HOSPICE | Age: 75
End: 2025-05-03
Payer: MEDICARE

## 2025-05-05 ENCOUNTER — HOME CARE VISIT (OUTPATIENT)
Dept: HOME HOSPICE | Facility: HOSPICE | Age: 75
End: 2025-05-05
Payer: MEDICARE

## 2025-05-05 ENCOUNTER — HOME CARE VISIT (OUTPATIENT)
Dept: HOME HEALTH SERVICES | Facility: HOME HEALTHCARE | Age: 75
End: 2025-05-05
Payer: MEDICARE

## 2025-05-05 PROCEDURE — G0156 HHCP-SVS OF AIDE,EA 15 MIN: HCPCS

## 2025-05-06 ENCOUNTER — HOME CARE VISIT (OUTPATIENT)
Dept: HOME HEALTH SERVICES | Facility: HOME HEALTHCARE | Age: 75
End: 2025-05-06
Payer: MEDICARE

## 2025-05-06 VITALS
HEART RATE: 58 BPM | RESPIRATION RATE: 18 BRPM | DIASTOLIC BLOOD PRESSURE: 68 MMHG | TEMPERATURE: 97.7 F | SYSTOLIC BLOOD PRESSURE: 118 MMHG

## 2025-05-06 PROCEDURE — G0299 HHS/HOSPICE OF RN EA 15 MIN: HCPCS

## 2025-05-06 PROCEDURE — G0156 HHCP-SVS OF AIDE,EA 15 MIN: HCPCS

## 2025-05-07 ENCOUNTER — HOME CARE VISIT (OUTPATIENT)
Dept: HOME HEALTH SERVICES | Facility: HOME HEALTHCARE | Age: 75
End: 2025-05-07
Payer: MEDICARE

## 2025-05-07 PROCEDURE — G0156 HHCP-SVS OF AIDE,EA 15 MIN: HCPCS

## 2025-05-08 ENCOUNTER — HOME CARE VISIT (OUTPATIENT)
Dept: HOME HEALTH SERVICES | Facility: HOME HEALTHCARE | Age: 75
End: 2025-05-08
Payer: MEDICARE

## 2025-05-08 PROCEDURE — G0156 HHCP-SVS OF AIDE,EA 15 MIN: HCPCS

## 2025-05-08 PROCEDURE — G0299 HHS/HOSPICE OF RN EA 15 MIN: HCPCS

## 2025-05-09 ENCOUNTER — HOME CARE VISIT (OUTPATIENT)
Dept: HOME HEALTH SERVICES | Facility: HOME HEALTHCARE | Age: 75
End: 2025-05-09
Payer: MEDICARE

## 2025-05-09 PROCEDURE — G0156 HHCP-SVS OF AIDE,EA 15 MIN: HCPCS

## 2025-05-12 ENCOUNTER — HOME CARE VISIT (OUTPATIENT)
Dept: HOME HEALTH SERVICES | Facility: HOME HEALTHCARE | Age: 75
End: 2025-05-12
Payer: MEDICARE

## 2025-05-12 VITALS — HEART RATE: 63 BPM | DIASTOLIC BLOOD PRESSURE: 60 MMHG | SYSTOLIC BLOOD PRESSURE: 109 MMHG | RESPIRATION RATE: 20 BRPM

## 2025-05-12 PROCEDURE — G0156 HHCP-SVS OF AIDE,EA 15 MIN: HCPCS

## 2025-05-13 ENCOUNTER — HOME CARE VISIT (OUTPATIENT)
Dept: HOME HEALTH SERVICES | Facility: HOME HEALTHCARE | Age: 75
End: 2025-05-13
Payer: MEDICARE

## 2025-05-13 ENCOUNTER — HOME CARE VISIT (OUTPATIENT)
Dept: HOME HOSPICE | Facility: HOSPICE | Age: 75
End: 2025-05-13
Payer: MEDICARE

## 2025-05-13 VITALS
RESPIRATION RATE: 18 BRPM | TEMPERATURE: 98.2 F | SYSTOLIC BLOOD PRESSURE: 127 MMHG | DIASTOLIC BLOOD PRESSURE: 61 MMHG | HEART RATE: 55 BPM | OXYGEN SATURATION: 92 %

## 2025-05-13 PROCEDURE — G0299 HHS/HOSPICE OF RN EA 15 MIN: HCPCS

## 2025-05-13 PROCEDURE — G0156 HHCP-SVS OF AIDE,EA 15 MIN: HCPCS

## 2025-05-14 ENCOUNTER — HOME CARE VISIT (OUTPATIENT)
Dept: HOME HOSPICE | Facility: HOSPICE | Age: 75
End: 2025-05-14
Payer: MEDICARE

## 2025-05-14 ENCOUNTER — HOME CARE VISIT (OUTPATIENT)
Dept: HOME HEALTH SERVICES | Facility: HOME HEALTHCARE | Age: 75
End: 2025-05-14
Payer: MEDICARE

## 2025-05-14 PROCEDURE — G0155 HHCP-SVS OF CSW,EA 15 MIN: HCPCS

## 2025-05-14 PROCEDURE — G0156 HHCP-SVS OF AIDE,EA 15 MIN: HCPCS

## 2025-05-15 ENCOUNTER — HOME CARE VISIT (OUTPATIENT)
Dept: HOME HEALTH SERVICES | Facility: HOME HEALTHCARE | Age: 75
End: 2025-05-15
Payer: MEDICARE

## 2025-05-15 PROCEDURE — G0299 HHS/HOSPICE OF RN EA 15 MIN: HCPCS

## 2025-05-15 PROCEDURE — G0155 HHCP-SVS OF CSW,EA 15 MIN: HCPCS

## 2025-05-15 PROCEDURE — G0156 HHCP-SVS OF AIDE,EA 15 MIN: HCPCS

## 2025-05-16 ENCOUNTER — HOME CARE VISIT (OUTPATIENT)
Dept: HOME HEALTH SERVICES | Facility: HOME HEALTHCARE | Age: 75
End: 2025-05-16
Payer: MEDICARE

## 2025-05-16 PROCEDURE — G0156 HHCP-SVS OF AIDE,EA 15 MIN: HCPCS

## 2025-05-19 ENCOUNTER — HOME CARE VISIT (OUTPATIENT)
Dept: HOME HOSPICE | Facility: HOSPICE | Age: 75
End: 2025-05-19
Payer: MEDICARE

## 2025-05-19 ENCOUNTER — HOME CARE VISIT (OUTPATIENT)
Dept: HOME HEALTH SERVICES | Facility: HOME HEALTHCARE | Age: 75
End: 2025-05-19
Payer: MEDICARE

## 2025-05-19 PROCEDURE — G0299 HHS/HOSPICE OF RN EA 15 MIN: HCPCS

## 2025-05-19 PROCEDURE — G0156 HHCP-SVS OF AIDE,EA 15 MIN: HCPCS

## 2025-05-20 ENCOUNTER — HOME CARE VISIT (OUTPATIENT)
Dept: HOME HOSPICE | Facility: HOSPICE | Age: 75
End: 2025-05-20
Payer: MEDICARE

## 2025-05-20 ENCOUNTER — HOME CARE VISIT (OUTPATIENT)
Dept: HOME HEALTH SERVICES | Facility: HOME HEALTHCARE | Age: 75
End: 2025-05-20
Payer: MEDICARE

## 2025-05-20 PROCEDURE — G0156 HHCP-SVS OF AIDE,EA 15 MIN: HCPCS

## 2025-05-21 ENCOUNTER — HOME CARE VISIT (OUTPATIENT)
Dept: HOME HEALTH SERVICES | Facility: HOME HEALTHCARE | Age: 75
End: 2025-05-21
Payer: MEDICARE

## 2025-05-21 PROCEDURE — G0155 HHCP-SVS OF CSW,EA 15 MIN: HCPCS

## 2025-05-21 PROCEDURE — G0156 HHCP-SVS OF AIDE,EA 15 MIN: HCPCS

## 2025-05-22 ENCOUNTER — HOME CARE VISIT (OUTPATIENT)
Dept: HOME HEALTH SERVICES | Facility: HOME HEALTHCARE | Age: 75
End: 2025-05-22
Payer: MEDICARE

## 2025-05-22 PROCEDURE — G0299 HHS/HOSPICE OF RN EA 15 MIN: HCPCS

## 2025-05-23 ENCOUNTER — HOME CARE VISIT (OUTPATIENT)
Dept: HOME HOSPICE | Facility: HOSPICE | Age: 75
End: 2025-05-23
Payer: MEDICARE

## 2025-05-23 ENCOUNTER — HOME CARE VISIT (OUTPATIENT)
Dept: HOME HEALTH SERVICES | Facility: HOME HEALTHCARE | Age: 75
End: 2025-05-23
Payer: MEDICARE

## 2025-05-23 PROCEDURE — G0156 HHCP-SVS OF AIDE,EA 15 MIN: HCPCS

## 2025-05-24 ENCOUNTER — HOME CARE VISIT (OUTPATIENT)
Dept: HOME HEALTH SERVICES | Facility: HOME HEALTHCARE | Age: 75
End: 2025-05-24
Payer: MEDICARE

## 2025-05-24 PROCEDURE — G0156 HHCP-SVS OF AIDE,EA 15 MIN: HCPCS

## 2025-05-25 ENCOUNTER — HOME CARE VISIT (OUTPATIENT)
Dept: HOME HEALTH SERVICES | Facility: HOME HEALTHCARE | Age: 75
End: 2025-05-25
Payer: MEDICARE

## 2025-05-25 PROCEDURE — G0156 HHCP-SVS OF AIDE,EA 15 MIN: HCPCS

## 2025-05-26 VITALS — SYSTOLIC BLOOD PRESSURE: 129 MMHG | HEART RATE: 58 BPM | RESPIRATION RATE: 18 BRPM | DIASTOLIC BLOOD PRESSURE: 63 MMHG

## 2025-05-28 ENCOUNTER — HOME CARE VISIT (OUTPATIENT)
Dept: HOME HEALTH SERVICES | Facility: HOME HEALTHCARE | Age: 75
End: 2025-05-28
Payer: MEDICARE

## 2025-05-28 PROCEDURE — G0299 HHS/HOSPICE OF RN EA 15 MIN: HCPCS

## 2025-05-28 PROCEDURE — G0156 HHCP-SVS OF AIDE,EA 15 MIN: HCPCS

## 2025-05-29 ENCOUNTER — HOME CARE VISIT (OUTPATIENT)
Dept: HOME HEALTH SERVICES | Facility: HOME HEALTHCARE | Age: 75
End: 2025-05-29
Payer: MEDICARE

## 2025-05-29 VITALS — RESPIRATION RATE: 16 BRPM | SYSTOLIC BLOOD PRESSURE: 133 MMHG | HEART RATE: 80 BPM | DIASTOLIC BLOOD PRESSURE: 69 MMHG

## 2025-05-29 PROCEDURE — G0156 HHCP-SVS OF AIDE,EA 15 MIN: HCPCS

## 2025-05-30 ENCOUNTER — HOME CARE VISIT (OUTPATIENT)
Dept: HOME HEALTH SERVICES | Facility: HOME HEALTHCARE | Age: 75
End: 2025-05-30
Payer: MEDICARE

## 2025-05-30 PROCEDURE — G0299 HHS/HOSPICE OF RN EA 15 MIN: HCPCS

## 2025-05-30 PROCEDURE — G0155 HHCP-SVS OF CSW,EA 15 MIN: HCPCS

## 2025-05-30 PROCEDURE — G0156 HHCP-SVS OF AIDE,EA 15 MIN: HCPCS

## 2025-06-01 ENCOUNTER — HOME CARE VISIT (OUTPATIENT)
Dept: HOME HOSPICE | Facility: HOSPICE | Age: 75
End: 2025-06-01
Payer: MEDICARE

## 2025-06-02 ENCOUNTER — HOME CARE VISIT (OUTPATIENT)
Dept: HOME HEALTH SERVICES | Facility: HOME HEALTHCARE | Age: 75
End: 2025-06-02
Payer: MEDICARE

## 2025-06-02 VITALS
TEMPERATURE: 97.3 F | SYSTOLIC BLOOD PRESSURE: 125 MMHG | DIASTOLIC BLOOD PRESSURE: 63 MMHG | HEART RATE: 56 BPM | RESPIRATION RATE: 18 BRPM

## 2025-06-02 PROCEDURE — G0156 HHCP-SVS OF AIDE,EA 15 MIN: HCPCS

## 2025-06-03 ENCOUNTER — HOME CARE VISIT (OUTPATIENT)
Dept: HOME HEALTH SERVICES | Facility: HOME HEALTHCARE | Age: 75
End: 2025-06-03
Payer: MEDICARE

## 2025-06-03 ENCOUNTER — HOME CARE VISIT (OUTPATIENT)
Dept: HOME HOSPICE | Facility: HOSPICE | Age: 75
End: 2025-06-03
Payer: MEDICARE

## 2025-06-03 PROCEDURE — G0156 HHCP-SVS OF AIDE,EA 15 MIN: HCPCS

## 2025-06-03 PROCEDURE — G0299 HHS/HOSPICE OF RN EA 15 MIN: HCPCS

## 2025-06-04 ENCOUNTER — HOME CARE VISIT (OUTPATIENT)
Dept: HOME HEALTH SERVICES | Facility: HOME HEALTHCARE | Age: 75
End: 2025-06-04
Payer: MEDICARE

## 2025-06-04 VITALS
DIASTOLIC BLOOD PRESSURE: 51 MMHG | HEART RATE: 56 BPM | TEMPERATURE: 98.4 F | RESPIRATION RATE: 18 BRPM | SYSTOLIC BLOOD PRESSURE: 111 MMHG

## 2025-06-04 PROCEDURE — G0156 HHCP-SVS OF AIDE,EA 15 MIN: HCPCS

## 2025-06-05 ENCOUNTER — HOME CARE VISIT (OUTPATIENT)
Dept: HOME HEALTH SERVICES | Facility: HOME HEALTHCARE | Age: 75
End: 2025-06-05
Payer: MEDICARE

## 2025-06-05 ENCOUNTER — HOME CARE VISIT (OUTPATIENT)
Dept: HOME HOSPICE | Facility: HOSPICE | Age: 75
End: 2025-06-05
Payer: MEDICARE

## 2025-06-05 PROCEDURE — G0156 HHCP-SVS OF AIDE,EA 15 MIN: HCPCS

## 2025-06-05 PROCEDURE — G0155 HHCP-SVS OF CSW,EA 15 MIN: HCPCS

## 2025-06-05 PROCEDURE — G0299 HHS/HOSPICE OF RN EA 15 MIN: HCPCS

## 2025-06-06 ENCOUNTER — HOME CARE VISIT (OUTPATIENT)
Dept: HOME HOSPICE | Facility: HOSPICE | Age: 75
End: 2025-06-06
Payer: MEDICARE

## 2025-06-07 ENCOUNTER — HOME CARE VISIT (OUTPATIENT)
Dept: HOME HOSPICE | Facility: HOSPICE | Age: 75
End: 2025-06-07
Payer: MEDICARE

## 2025-06-09 ENCOUNTER — HOME CARE VISIT (OUTPATIENT)
Dept: HOME HEALTH SERVICES | Facility: HOME HEALTHCARE | Age: 75
End: 2025-06-09
Payer: MEDICARE

## 2025-06-09 PROCEDURE — G0299 HHS/HOSPICE OF RN EA 15 MIN: HCPCS

## 2025-06-09 PROCEDURE — G0156 HHCP-SVS OF AIDE,EA 15 MIN: HCPCS

## 2025-06-10 ENCOUNTER — HOME CARE VISIT (OUTPATIENT)
Dept: HOME HOSPICE | Facility: HOSPICE | Age: 75
End: 2025-06-10
Payer: MEDICARE

## 2025-06-11 ENCOUNTER — HOME CARE VISIT (OUTPATIENT)
Dept: HOME HEALTH SERVICES | Facility: HOME HEALTHCARE | Age: 75
End: 2025-06-11
Payer: MEDICARE

## 2025-06-11 VITALS
SYSTOLIC BLOOD PRESSURE: 101 MMHG | BODY MASS INDEX: 69.09 KG/M2 | DIASTOLIC BLOOD PRESSURE: 63 MMHG | WEIGHT: 293 LBS | TEMPERATURE: 97.4 F | RESPIRATION RATE: 18 BRPM | HEART RATE: 57 BPM

## 2025-06-11 PROCEDURE — G0156 HHCP-SVS OF AIDE,EA 15 MIN: HCPCS

## 2025-06-11 PROCEDURE — G0299 HHS/HOSPICE OF RN EA 15 MIN: HCPCS

## 2025-06-12 ENCOUNTER — HOME CARE VISIT (OUTPATIENT)
Dept: HOME HEALTH SERVICES | Facility: HOME HEALTHCARE | Age: 75
End: 2025-06-12
Payer: MEDICARE

## 2025-06-12 PROCEDURE — G0299 HHS/HOSPICE OF RN EA 15 MIN: HCPCS

## 2025-06-12 PROCEDURE — G0156 HHCP-SVS OF AIDE,EA 15 MIN: HCPCS

## 2025-06-13 ENCOUNTER — HOME CARE VISIT (OUTPATIENT)
Dept: HOME HEALTH SERVICES | Facility: HOME HEALTHCARE | Age: 75
End: 2025-06-13
Payer: MEDICARE

## 2025-06-13 PROCEDURE — G0156 HHCP-SVS OF AIDE,EA 15 MIN: HCPCS

## 2025-06-16 ENCOUNTER — HOME CARE VISIT (OUTPATIENT)
Dept: HOME HOSPICE | Facility: HOSPICE | Age: 75
End: 2025-06-16
Payer: MEDICARE

## 2025-06-16 ENCOUNTER — HOME CARE VISIT (OUTPATIENT)
Dept: HOME HEALTH SERVICES | Facility: HOME HEALTHCARE | Age: 75
End: 2025-06-16
Payer: MEDICARE

## 2025-06-16 PROCEDURE — G0156 HHCP-SVS OF AIDE,EA 15 MIN: HCPCS

## 2025-06-17 ENCOUNTER — HOME CARE VISIT (OUTPATIENT)
Dept: HOME HEALTH SERVICES | Facility: HOME HEALTHCARE | Age: 75
End: 2025-06-17
Payer: MEDICARE

## 2025-06-17 PROCEDURE — G0299 HHS/HOSPICE OF RN EA 15 MIN: HCPCS

## 2025-06-17 PROCEDURE — G0156 HHCP-SVS OF AIDE,EA 15 MIN: HCPCS

## 2025-06-18 ENCOUNTER — HOME CARE VISIT (OUTPATIENT)
Dept: HOME HOSPICE | Facility: HOSPICE | Age: 75
End: 2025-06-18
Payer: MEDICARE

## 2025-06-18 VITALS
OXYGEN SATURATION: 95 % | RESPIRATION RATE: 18 BRPM | TEMPERATURE: 97.6 F | SYSTOLIC BLOOD PRESSURE: 112 MMHG | DIASTOLIC BLOOD PRESSURE: 59 MMHG | HEART RATE: 51 BPM

## 2025-06-19 ENCOUNTER — HOME CARE VISIT (OUTPATIENT)
Dept: HOME HEALTH SERVICES | Facility: HOME HEALTHCARE | Age: 75
End: 2025-06-19
Payer: MEDICARE

## 2025-06-19 VITALS
SYSTOLIC BLOOD PRESSURE: 135 MMHG | DIASTOLIC BLOOD PRESSURE: 70 MMHG | RESPIRATION RATE: 18 BRPM | TEMPERATURE: 97.2 F | BODY MASS INDEX: 68.73 KG/M2 | WEIGHT: 293 LBS | HEART RATE: 57 BPM

## 2025-06-19 PROCEDURE — G0299 HHS/HOSPICE OF RN EA 15 MIN: HCPCS

## 2025-06-19 PROCEDURE — G0156 HHCP-SVS OF AIDE,EA 15 MIN: HCPCS

## 2025-06-21 ENCOUNTER — HOME CARE VISIT (OUTPATIENT)
Dept: HOME HEALTH SERVICES | Facility: HOME HEALTHCARE | Age: 75
End: 2025-06-21
Payer: MEDICARE

## 2025-06-21 PROCEDURE — G0156 HHCP-SVS OF AIDE,EA 15 MIN: HCPCS

## 2025-06-23 ENCOUNTER — HOME CARE VISIT (OUTPATIENT)
Dept: HOME HOSPICE | Facility: HOSPICE | Age: 75
End: 2025-06-23
Payer: MEDICARE

## 2025-06-23 ENCOUNTER — HOME CARE VISIT (OUTPATIENT)
Dept: HOME HEALTH SERVICES | Facility: HOME HEALTHCARE | Age: 75
End: 2025-06-23
Payer: MEDICARE

## 2025-06-23 PROCEDURE — G0299 HHS/HOSPICE OF RN EA 15 MIN: HCPCS

## 2025-06-24 ENCOUNTER — HOME CARE VISIT (OUTPATIENT)
Dept: HOME HEALTH SERVICES | Facility: HOME HEALTHCARE | Age: 75
End: 2025-06-24
Payer: MEDICARE

## 2025-06-24 ENCOUNTER — HOME CARE VISIT (OUTPATIENT)
Dept: HOME HOSPICE | Facility: HOSPICE | Age: 75
End: 2025-06-24
Payer: MEDICARE

## 2025-06-24 VITALS
OXYGEN SATURATION: 94 % | TEMPERATURE: 97.7 F | RESPIRATION RATE: 18 BRPM | HEART RATE: 53 BPM | DIASTOLIC BLOOD PRESSURE: 65 MMHG | SYSTOLIC BLOOD PRESSURE: 124 MMHG

## 2025-06-24 PROCEDURE — G0156 HHCP-SVS OF AIDE,EA 15 MIN: HCPCS

## 2025-06-24 PROCEDURE — G0155 HHCP-SVS OF CSW,EA 15 MIN: HCPCS

## 2025-06-24 PROCEDURE — G0299 HHS/HOSPICE OF RN EA 15 MIN: HCPCS

## 2025-06-25 ENCOUNTER — HOME CARE VISIT (OUTPATIENT)
Dept: HOME HOSPICE | Facility: HOSPICE | Age: 75
End: 2025-06-25
Payer: MEDICARE

## 2025-06-25 ENCOUNTER — HOME CARE VISIT (OUTPATIENT)
Dept: HOME HEALTH SERVICES | Facility: HOME HEALTHCARE | Age: 75
End: 2025-06-25
Payer: MEDICARE

## 2025-06-25 VITALS
DIASTOLIC BLOOD PRESSURE: 70 MMHG | HEART RATE: 55 BPM | RESPIRATION RATE: 18 BRPM | SYSTOLIC BLOOD PRESSURE: 130 MMHG | TEMPERATURE: 97.1 F

## 2025-06-25 PROCEDURE — G0299 HHS/HOSPICE OF RN EA 15 MIN: HCPCS

## 2025-06-25 PROCEDURE — G0156 HHCP-SVS OF AIDE,EA 15 MIN: HCPCS

## 2025-06-27 ENCOUNTER — HOME CARE VISIT (OUTPATIENT)
Dept: HOME HOSPICE | Facility: HOSPICE | Age: 75
End: 2025-06-27
Payer: MEDICARE

## 2025-06-27 VITALS
HEART RATE: 55 BPM | RESPIRATION RATE: 19 BRPM | SYSTOLIC BLOOD PRESSURE: 132 MMHG | TEMPERATURE: 97.8 F | DIASTOLIC BLOOD PRESSURE: 78 MMHG

## 2025-06-27 PROCEDURE — G0299 HHS/HOSPICE OF RN EA 15 MIN: HCPCS

## 2025-06-27 RX ADMIN — Medication 10 MG: at 09:36

## 2025-06-30 ENCOUNTER — HOME CARE VISIT (OUTPATIENT)
Dept: HOME HEALTH SERVICES | Facility: HOME HEALTHCARE | Age: 75
End: 2025-06-30
Payer: MEDICARE

## 2025-06-30 VITALS
DIASTOLIC BLOOD PRESSURE: 78 MMHG | TEMPERATURE: 97.1 F | RESPIRATION RATE: 19 BRPM | HEART RATE: 62 BPM | SYSTOLIC BLOOD PRESSURE: 118 MMHG

## 2025-06-30 PROCEDURE — G0299 HHS/HOSPICE OF RN EA 15 MIN: HCPCS

## 2025-07-02 ENCOUNTER — TELEPHONE (OUTPATIENT)
Dept: OTHER | Facility: OTHER | Age: 75
End: 2025-07-02

## 2025-07-02 ENCOUNTER — HOME CARE VISIT (OUTPATIENT)
Dept: HOME HOSPICE | Facility: HOSPICE | Age: 75
End: 2025-07-02
Payer: MEDICARE

## 2025-07-03 ENCOUNTER — HOME CARE VISIT (OUTPATIENT)
Dept: HOME HOSPICE | Facility: HOSPICE | Age: 75
End: 2025-07-03
Payer: MEDICARE

## 2025-07-03 ENCOUNTER — HOME CARE VISIT (OUTPATIENT)
Dept: HOME HEALTH SERVICES | Facility: HOME HEALTHCARE | Age: 75
End: 2025-07-03
Payer: MEDICARE

## 2025-07-03 VITALS
RESPIRATION RATE: 18 BRPM | TEMPERATURE: 97.2 F | SYSTOLIC BLOOD PRESSURE: 120 MMHG | HEART RATE: 40 BPM | DIASTOLIC BLOOD PRESSURE: 80 MMHG

## 2025-07-03 PROCEDURE — G0299 HHS/HOSPICE OF RN EA 15 MIN: HCPCS

## 2025-07-07 ENCOUNTER — HOME CARE VISIT (OUTPATIENT)
Dept: HOME HEALTH SERVICES | Facility: HOME HEALTHCARE | Age: 75
End: 2025-07-07
Payer: MEDICARE

## 2025-07-07 VITALS
SYSTOLIC BLOOD PRESSURE: 132 MMHG | RESPIRATION RATE: 18 BRPM | HEART RATE: 54 BPM | DIASTOLIC BLOOD PRESSURE: 76 MMHG | TEMPERATURE: 97.3 F

## 2025-07-07 PROCEDURE — G0299 HHS/HOSPICE OF RN EA 15 MIN: HCPCS

## 2025-07-10 ENCOUNTER — HOME CARE VISIT (OUTPATIENT)
Dept: HOME HOSPICE | Facility: HOSPICE | Age: 75
End: 2025-07-10
Payer: MEDICARE

## 2025-07-10 ENCOUNTER — HOME CARE VISIT (OUTPATIENT)
Dept: HOME HEALTH SERVICES | Facility: HOME HEALTHCARE | Age: 75
End: 2025-07-10
Payer: MEDICARE

## 2025-07-10 VITALS
DIASTOLIC BLOOD PRESSURE: 60 MMHG | HEART RATE: 50 BPM | SYSTOLIC BLOOD PRESSURE: 110 MMHG | TEMPERATURE: 97.4 F | RESPIRATION RATE: 20 BRPM

## 2025-07-10 PROCEDURE — G0299 HHS/HOSPICE OF RN EA 15 MIN: HCPCS

## 2025-07-10 RX ADMIN — Medication 1 ENEMA: at 18:15

## 2025-07-11 ENCOUNTER — HOME CARE VISIT (OUTPATIENT)
Dept: HOME HEALTH SERVICES | Facility: HOME HEALTHCARE | Age: 75
End: 2025-07-11
Payer: MEDICARE

## 2025-07-11 VITALS
DIASTOLIC BLOOD PRESSURE: 64 MMHG | HEART RATE: 52 BPM | RESPIRATION RATE: 20 BRPM | TEMPERATURE: 97.2 F | SYSTOLIC BLOOD PRESSURE: 118 MMHG

## 2025-07-11 PROCEDURE — G0299 HHS/HOSPICE OF RN EA 15 MIN: HCPCS

## 2025-07-14 ENCOUNTER — HOME CARE VISIT (OUTPATIENT)
Dept: HOME HEALTH SERVICES | Facility: HOME HEALTHCARE | Age: 75
End: 2025-07-14
Payer: MEDICARE

## 2025-07-14 ENCOUNTER — HOME CARE VISIT (OUTPATIENT)
Dept: HOME HOSPICE | Facility: HOSPICE | Age: 75
End: 2025-07-14
Payer: MEDICARE

## 2025-07-14 VITALS
TEMPERATURE: 97.4 F | HEART RATE: 65 BPM | DIASTOLIC BLOOD PRESSURE: 60 MMHG | RESPIRATION RATE: 18 BRPM | SYSTOLIC BLOOD PRESSURE: 116 MMHG

## 2025-07-14 PROCEDURE — G0299 HHS/HOSPICE OF RN EA 15 MIN: HCPCS

## 2025-07-15 ENCOUNTER — HOME CARE VISIT (OUTPATIENT)
Dept: HOME HOSPICE | Facility: HOSPICE | Age: 75
End: 2025-07-15
Payer: MEDICARE

## 2025-07-16 ENCOUNTER — HOME CARE VISIT (OUTPATIENT)
Dept: HOME HOSPICE | Facility: HOSPICE | Age: 75
End: 2025-07-16
Payer: MEDICARE

## 2025-07-16 ENCOUNTER — TELEPHONE (OUTPATIENT)
Dept: OTHER | Facility: OTHER | Age: 75
End: 2025-07-16

## 2025-07-17 ENCOUNTER — HOME CARE VISIT (OUTPATIENT)
Dept: HOME HEALTH SERVICES | Facility: HOME HEALTHCARE | Age: 75
End: 2025-07-17
Payer: MEDICARE

## 2025-07-17 VITALS
RESPIRATION RATE: 18 BRPM | HEART RATE: 49 BPM | DIASTOLIC BLOOD PRESSURE: 70 MMHG | TEMPERATURE: 97.1 F | SYSTOLIC BLOOD PRESSURE: 110 MMHG

## 2025-07-17 PROCEDURE — G0299 HHS/HOSPICE OF RN EA 15 MIN: HCPCS

## 2025-07-17 NOTE — TELEPHONE ENCOUNTER
"Reason for Call: Patient called stating, \"I started again with diarrhea and I can't keep anything in my stomach. \"     Caller's Name: Jyoit    Caller's Relationship to Patient: Self    Caller's Callback Number: [phone]    Home Health OR Hospice Patient: Hospice  "

## 2025-07-21 ENCOUNTER — HOME CARE VISIT (OUTPATIENT)
Dept: HOME HEALTH SERVICES | Facility: HOME HEALTHCARE | Age: 75
End: 2025-07-21
Payer: MEDICARE

## 2025-07-21 VITALS
TEMPERATURE: 97.1 F | RESPIRATION RATE: 18 BRPM | DIASTOLIC BLOOD PRESSURE: 64 MMHG | SYSTOLIC BLOOD PRESSURE: 126 MMHG | HEART RATE: 56 BPM

## 2025-07-21 PROCEDURE — G0299 HHS/HOSPICE OF RN EA 15 MIN: HCPCS

## 2025-07-22 ENCOUNTER — HOME CARE VISIT (OUTPATIENT)
Dept: HOME HOSPICE | Facility: HOSPICE | Age: 75
End: 2025-07-22
Payer: MEDICARE

## 2025-07-24 ENCOUNTER — HOME CARE VISIT (OUTPATIENT)
Dept: HOME HEALTH SERVICES | Facility: HOME HEALTHCARE | Age: 75
End: 2025-07-24
Payer: MEDICARE

## 2025-07-24 VITALS
SYSTOLIC BLOOD PRESSURE: 117 MMHG | HEART RATE: 60 BPM | DIASTOLIC BLOOD PRESSURE: 60 MMHG | TEMPERATURE: 97.7 F | RESPIRATION RATE: 18 BRPM

## 2025-07-24 PROCEDURE — G0299 HHS/HOSPICE OF RN EA 15 MIN: HCPCS

## 2025-07-26 ENCOUNTER — HOME CARE VISIT (OUTPATIENT)
Dept: HOME HOSPICE | Facility: HOSPICE | Age: 75
End: 2025-07-26
Payer: MEDICARE

## 2025-07-26 ENCOUNTER — TELEPHONE (OUTPATIENT)
Dept: OTHER | Facility: OTHER | Age: 75
End: 2025-07-26

## 2025-07-27 ENCOUNTER — HOME CARE VISIT (OUTPATIENT)
Dept: HOME HOSPICE | Facility: HOSPICE | Age: 75
End: 2025-07-27
Payer: MEDICARE

## 2025-07-27 NOTE — TELEPHONE ENCOUNTER
Reason for Call: Pt is having stomach pains. She did take her Ativan and her Norco around 945 but would like to know if she can take her zofran at this time.     Caller's Name: Jyoti Moreno's Relationship to Patient: Self     Caller's Callback Number: [phone]     Home Health OR Hospice Patient: Hospice

## 2025-07-28 ENCOUNTER — HOME CARE VISIT (OUTPATIENT)
Dept: HOME HOSPICE | Facility: HOSPICE | Age: 75
End: 2025-07-28
Payer: MEDICARE

## 2025-07-29 ENCOUNTER — HOME CARE VISIT (OUTPATIENT)
Dept: HOME HEALTH SERVICES | Facility: HOME HEALTHCARE | Age: 75
End: 2025-07-29
Payer: MEDICARE

## 2025-07-29 ENCOUNTER — HOME CARE VISIT (OUTPATIENT)
Dept: HOME HOSPICE | Facility: HOSPICE | Age: 75
End: 2025-07-29
Payer: MEDICARE

## 2025-07-29 VITALS
TEMPERATURE: 96.9 F | DIASTOLIC BLOOD PRESSURE: 62 MMHG | RESPIRATION RATE: 18 BRPM | SYSTOLIC BLOOD PRESSURE: 114 MMHG | HEART RATE: 49 BPM

## 2025-07-29 PROCEDURE — G0299 HHS/HOSPICE OF RN EA 15 MIN: HCPCS

## 2025-07-30 ENCOUNTER — HOME CARE VISIT (OUTPATIENT)
Dept: HOME HOSPICE | Facility: HOSPICE | Age: 75
End: 2025-07-30
Payer: MEDICARE

## 2025-07-30 PROCEDURE — G0155 HHCP-SVS OF CSW,EA 15 MIN: HCPCS

## 2025-07-31 ENCOUNTER — HOME CARE VISIT (OUTPATIENT)
Dept: HOME HEALTH SERVICES | Facility: HOME HEALTHCARE | Age: 75
End: 2025-07-31
Payer: MEDICARE

## 2025-07-31 VITALS
HEART RATE: 57 BPM | RESPIRATION RATE: 18 BRPM | TEMPERATURE: 97.1 F | SYSTOLIC BLOOD PRESSURE: 106 MMHG | DIASTOLIC BLOOD PRESSURE: 55 MMHG

## 2025-07-31 PROCEDURE — G0299 HHS/HOSPICE OF RN EA 15 MIN: HCPCS

## 2025-08-01 ENCOUNTER — TELEPHONE (OUTPATIENT)
Dept: HOME HEALTH SERVICES | Facility: HOME HEALTHCARE | Age: 75
End: 2025-08-01

## 2025-08-01 ENCOUNTER — HOME CARE VISIT (OUTPATIENT)
Dept: HOME HEALTH SERVICES | Facility: HOME HEALTHCARE | Age: 75
End: 2025-08-01
Payer: MEDICARE

## 2025-08-01 ENCOUNTER — HOME CARE VISIT (OUTPATIENT)
Dept: HOME HOSPICE | Facility: HOSPICE | Age: 75
End: 2025-08-01
Payer: MEDICARE

## 2025-08-01 VITALS — RESPIRATION RATE: 18 BRPM

## 2025-08-01 PROCEDURE — G0299 HHS/HOSPICE OF RN EA 15 MIN: HCPCS

## 2025-08-01 RX ADMIN — Medication 1 ENEMA: at 11:00

## 2025-08-01 RX ADMIN — LORAZEPAM 0.5 MG: 0.5 TABLET ORAL at 11:30

## 2025-08-01 RX ADMIN — HYDROCODONE BITARTRATE AND ACETAMINOPHEN 1 TABLET: 5; 325 TABLET ORAL at 11:30

## 2025-08-03 ENCOUNTER — HOME CARE VISIT (OUTPATIENT)
Dept: HOME HOSPICE | Facility: HOSPICE | Age: 75
End: 2025-08-03
Payer: MEDICARE

## 2025-08-03 ENCOUNTER — TELEPHONE (OUTPATIENT)
Dept: OTHER | Facility: OTHER | Age: 75
End: 2025-08-03

## 2025-08-04 ENCOUNTER — HOME CARE VISIT (OUTPATIENT)
Dept: HOME HOSPICE | Facility: HOSPICE | Age: 75
End: 2025-08-04
Payer: MEDICARE

## 2025-08-05 ENCOUNTER — HOME CARE VISIT (OUTPATIENT)
Dept: HOME HOSPICE | Facility: HOSPICE | Age: 75
End: 2025-08-05
Payer: MEDICARE

## 2025-08-05 ENCOUNTER — HOME CARE VISIT (OUTPATIENT)
Dept: HOME HEALTH SERVICES | Facility: HOME HEALTHCARE | Age: 75
End: 2025-08-05
Payer: MEDICARE

## 2025-08-05 VITALS
DIASTOLIC BLOOD PRESSURE: 60 MMHG | SYSTOLIC BLOOD PRESSURE: 96 MMHG | TEMPERATURE: 97.3 F | HEART RATE: 84 BPM | RESPIRATION RATE: 18 BRPM

## 2025-08-05 PROCEDURE — G0299 HHS/HOSPICE OF RN EA 15 MIN: HCPCS

## 2025-08-06 ENCOUNTER — HOME CARE VISIT (OUTPATIENT)
Dept: HOME HOSPICE | Facility: HOSPICE | Age: 75
End: 2025-08-06
Payer: MEDICARE

## 2025-08-07 ENCOUNTER — HOME CARE VISIT (OUTPATIENT)
Dept: HOME HOSPICE | Facility: HOSPICE | Age: 75
End: 2025-08-07
Payer: MEDICARE

## 2025-08-07 ENCOUNTER — HOME CARE VISIT (OUTPATIENT)
Dept: HOME HEALTH SERVICES | Facility: HOME HEALTHCARE | Age: 75
End: 2025-08-07
Payer: MEDICARE

## 2025-08-07 VITALS
DIASTOLIC BLOOD PRESSURE: 60 MMHG | HEART RATE: 45 BPM | SYSTOLIC BLOOD PRESSURE: 110 MMHG | TEMPERATURE: 97.7 F | RESPIRATION RATE: 18 BRPM

## 2025-08-07 PROCEDURE — G0299 HHS/HOSPICE OF RN EA 15 MIN: HCPCS

## 2025-08-12 ENCOUNTER — HOME CARE VISIT (OUTPATIENT)
Dept: HOME HEALTH SERVICES | Facility: HOME HEALTHCARE | Age: 75
End: 2025-08-12
Payer: MEDICARE

## 2025-08-12 ENCOUNTER — HOME CARE VISIT (OUTPATIENT)
Dept: HOME HOSPICE | Facility: HOSPICE | Age: 75
End: 2025-08-12
Payer: MEDICARE

## 2025-08-14 ENCOUNTER — HOME CARE VISIT (OUTPATIENT)
Dept: HOME HOSPICE | Facility: HOSPICE | Age: 75
End: 2025-08-14
Payer: MEDICARE

## 2025-08-14 ENCOUNTER — HOME CARE VISIT (OUTPATIENT)
Dept: HOME HEALTH SERVICES | Facility: HOME HEALTHCARE | Age: 75
End: 2025-08-14
Payer: MEDICARE

## 2025-08-15 ENCOUNTER — HOME CARE VISIT (OUTPATIENT)
Dept: HOME HEALTH SERVICES | Facility: HOME HEALTHCARE | Age: 75
End: 2025-08-15
Payer: MEDICARE

## 2025-08-15 ENCOUNTER — HOME CARE VISIT (OUTPATIENT)
Dept: HOME HOSPICE | Facility: HOSPICE | Age: 75
End: 2025-08-15
Payer: MEDICARE

## 2025-08-18 ENCOUNTER — HOME CARE VISIT (OUTPATIENT)
Dept: HOME HOSPICE | Facility: HOSPICE | Age: 75
End: 2025-08-18
Payer: MEDICARE

## 2025-08-18 ENCOUNTER — HOME CARE VISIT (OUTPATIENT)
Dept: HOME HEALTH SERVICES | Facility: HOME HEALTHCARE | Age: 75
End: 2025-08-18
Payer: MEDICARE

## 2025-08-18 VITALS
DIASTOLIC BLOOD PRESSURE: 62 MMHG | RESPIRATION RATE: 18 BRPM | HEART RATE: 58 BPM | SYSTOLIC BLOOD PRESSURE: 114 MMHG | TEMPERATURE: 97.8 F

## 2025-08-18 PROCEDURE — G0299 HHS/HOSPICE OF RN EA 15 MIN: HCPCS

## 2025-08-20 ENCOUNTER — HOME CARE VISIT (OUTPATIENT)
Dept: HOME HEALTH SERVICES | Facility: HOME HEALTHCARE | Age: 75
End: 2025-08-20
Payer: MEDICARE

## 2025-08-20 VITALS
RESPIRATION RATE: 19 BRPM | SYSTOLIC BLOOD PRESSURE: 122 MMHG | DIASTOLIC BLOOD PRESSURE: 76 MMHG | TEMPERATURE: 97.5 F | HEART RATE: 67 BPM

## 2025-08-20 PROCEDURE — G0299 HHS/HOSPICE OF RN EA 15 MIN: HCPCS

## 2025-08-22 ENCOUNTER — HOME CARE VISIT (OUTPATIENT)
Dept: HOME HEALTH SERVICES | Facility: HOME HEALTHCARE | Age: 75
End: 2025-08-22
Payer: MEDICARE

## 2025-08-22 ENCOUNTER — HOME CARE VISIT (OUTPATIENT)
Dept: HOME HOSPICE | Facility: HOSPICE | Age: 75
End: 2025-08-22
Payer: MEDICARE

## 2025-08-22 VITALS
DIASTOLIC BLOOD PRESSURE: 54 MMHG | TEMPERATURE: 98 F | HEART RATE: 56 BPM | RESPIRATION RATE: 18 BRPM | SYSTOLIC BLOOD PRESSURE: 106 MMHG

## 2025-08-22 PROCEDURE — G0299 HHS/HOSPICE OF RN EA 15 MIN: HCPCS

## (undated) DEVICE — TOWEL SURG XR DETECT GREEN STRL RFD

## (undated) DEVICE — SPECIMEN CONTAINER: Brand: CARDINAL HEALTH

## (undated) DEVICE — SKIN MARKER DUAL TIP WITH RULER CAP, FLEXIBLE RULER AND LABELS: Brand: DEVON

## (undated) DEVICE — EYE PADS 1 5/8"X2 5/8": Brand: MCKESSON

## (undated) DEVICE — NEEDLE PHACO 20G 30DEG THIN TIP

## (undated) DEVICE — PACK PHACO

## (undated) DEVICE — HYDRODELINATOR .50MM TAPERED

## (undated) DEVICE — CLEARCUT® SLIT KNIFE 2.75MM ANGLED: Brand: CLEARCUT®

## (undated) DEVICE — STERILE NEWMAN CATARACT PACK: Brand: CARDINAL HEALTH

## (undated) DEVICE — IRRIGATION HANDPIECE SET 2.2-2.8MM 45DEG ANGL TIP

## (undated) DEVICE — GLOVE SRG BIOGEL 7

## (undated) DEVICE — MICROSURGICAL INSTRUMENT IRR CYSTITOME 23GA REVERSE CUT DEEP-SET EYES: Brand: ALCON

## (undated) DEVICE — STOCKINETTE REGULAR